# Patient Record
Sex: FEMALE | Race: WHITE | Employment: OTHER | URBAN - METROPOLITAN AREA
[De-identification: names, ages, dates, MRNs, and addresses within clinical notes are randomized per-mention and may not be internally consistent; named-entity substitution may affect disease eponyms.]

---

## 2017-02-14 ENCOUNTER — OFFICE VISIT (OUTPATIENT)
Dept: FAMILY MEDICINE | Facility: CLINIC | Age: 77
End: 2017-02-14
Payer: MEDICARE

## 2017-02-14 VITALS
BODY MASS INDEX: 18.47 KG/M2 | SYSTOLIC BLOOD PRESSURE: 127 MMHG | HEIGHT: 63 IN | DIASTOLIC BLOOD PRESSURE: 79 MMHG | WEIGHT: 104.25 LBS | TEMPERATURE: 97.4 F | OXYGEN SATURATION: 98 % | HEART RATE: 62 BPM | RESPIRATION RATE: 16 BRPM

## 2017-02-14 DIAGNOSIS — H61.23 BILATERAL IMPACTED CERUMEN: ICD-10-CM

## 2017-02-14 DIAGNOSIS — Z12.11 SPECIAL SCREENING FOR MALIGNANT NEOPLASMS, COLON: ICD-10-CM

## 2017-02-14 DIAGNOSIS — L98.9 SKIN LESION: Primary | ICD-10-CM

## 2017-02-14 PROCEDURE — 99214 OFFICE O/P EST MOD 30 MIN: CPT | Performed by: NURSE PRACTITIONER

## 2017-02-14 NOTE — MR AVS SNAPSHOT
After Visit Summary   2/14/2017    Elizabeth Taylor    MRN: [id number]           Patient Information     Date Of Birth          1940        Visit Information        Provider Department      2/14/2017 12:45 PM Felisha Davis NP [facility]        Today's Diagnoses     Skin lesion    -  1    Bilateral impacted cerumen        Special screening for malignant neoplasms, colon           Follow-ups after your visit        Additional Services     DERMATOLOGY REFERRAL       Your provider has referred you to: [facility] [place] [phone]   [email or web address]    Please be aware that coverage of these services is subject to the terms and limitations of your health insurance plan.  Call member services at your health plan with any benefit or coverage questions.      Please bring the following with you to your appointment:    (1) Any X-Rays, CTs or MRIs which have been performed.  Contact the facility where they were done to arrange for  prior to your scheduled appointment.    (2) List of current medications  (3) This referral request   (4) Any documents/labs given to you for this referral            GASTROENTEROLOGY ADULT REF PROCEDURE ONLY       [facility] [phone]                  Who to contact     If you have questions or need follow up information about today's clinic visit or your schedule please contact [facility] directly at [phone].  Normal or non-critical lab and imaging results will be communicated to you by MyChart, letter or phone within 4 business days after the clinic has received the results. If you do not hear from us within 7 days, please contact the clinic through MyChart or phone. If you have a critical or abnormal lab result, we will notify you by phone as soon as possible.  Submit refill requests through [employer] or call your pharmacy and they will forward the refill  "request to us. Please allow 3 business days for your refill to be completed.          Additional Information About Your Visit        MyChart Information     [employer] lets you send messages to your doctor, view your test results, renew your prescriptions, schedule appointments and more. To sign up, go to [email or web address] . Click on \"Log in\" on the left side of the screen, which will take you to the Welcome page. Then click on \"Sign up Now\" on the right side of the page.     You will be asked to enter the access code listed below, as well as some personal information. Please follow the directions to create your username and password.     Your access code is: [email or web address]  Expires: 5/15/2017  1:37 PM     Your access code will  in 90 days. If you need help or a new code, please call your [place] clinic or [phone].        Care EveryWhere ID     This is your Care EveryWhere ID. This could be used by other organizations to access your [place] medical records  [id number]        Your Vitals Were     Pulse Temperature Respirations Height Pulse Oximetry BMI (Body Mass Index)    62 97.4  F (36.3  C) (Tympanic) 16 5' 3.25\" (1.607 m) 98% 18.32 kg/m2       Blood Pressure from Last 3 Encounters:   17 127/79   12 102/62   11 106/68    Weight from Last 3 Encounters:   17 104 lb 4 oz (47.3 kg)   12 116 lb 3.2 oz (52.7 kg)   09 115 lb (52.2 kg)              We Performed the Following     DERMATOLOGY REFERRAL     GASTROENTEROLOGY ADULT REF PROCEDURE ONLY        Primary Care Provider Office Phone # Fax #    Baron Seth [phone]       [address]  [facility] [address]        Thank you!     Thank you for choosing [facility]  for your care. Our goal is always to provide you with excellent care. Hearing back from our patients is one way we can continue to improve our services. Please take a few minutes to complete the " written survey that you may receive in the mail after your visit with us. Thank you!             Your Updated Medication List - Protect others around you: Learn how to safely use, store and throw away your medicines at www.disposemymeds.org.          This list is accurate as of: 2/14/17  1:37 PM.  Always use your most recent med list.                   Brand Name Dispense Instructions for use    TYLENOL EXTRA STRENGTH PO      1 TABLET EVERY 4 HOURS AS NEEDED

## 2017-02-14 NOTE — NURSING NOTE
"Chief Complaint   Patient presents with     Derm Problem     bleeding area below anal area      Derm Problem     lump on groin discovered      Derm Problem     spot on face        Initial /79  Pulse 62  Temp 97.4  F (36.3  C) (Tympanic)  Resp 16  Ht 5' 3.25\" (1.607 m)  Wt 104 lb 4 oz (47.3 kg)  SpO2 98%  BMI 18.32 kg/m2 Estimated body mass index is 18.32 kg/(m^2) as calculated from the following:    Height as of this encounter: 5' 3.25\" (1.607 m).    Weight as of this encounter: 104 lb 4 oz (47.3 kg).  Medication Reconciliation: complete     Vangie Beltre MA      "

## 2017-02-14 NOTE — PROGRESS NOTES
"  SUBJECTIVE:                                                    Elizabeth Taylor is a 76 year old female who presents to clinic today for the following health issues:      Derm Problem      Duration: a few weeks     Description  Location: area below anus, spot on face, and bump on groin.     Also she has had shingles and she has been using a paste. She states she is \"okay\" with the shingles; gradually got better.     She notes that she has bad posture and she has been doing exercises ([facility]).        Spot on left cheek started bleeding recently.  Has been present for several months.  She did notice a couple of spots of blood with wiping after a BM when the bump on her anus was larger.  This occurred after having a larger than normal BM.  She has since noticed that this lump has become smaller in size.  She has never had colon cancer screening.    She thinks her hearing has worsened lately.      Problem list and histories reviewed & adjusted, as indicated.  Additional history: as documented    Problem list, Medication list, Allergies, and Medical/Social/Surgical histories reviewed in EPIC and updated as appropriate.    ROS:  C: NEGATIVE for fever, chills, change in weight  INTEGUMENTARY/SKIN: see HPI  ENT/MOUTH: see HPI  R: NEGATIVE for significant cough or SOB  CV: NEGATIVE for chest pain, palpitations or peripheral edema  GI: NEGATIVE for nausea, abdominal pain, heartburn, or change in bowel habits  MUSCULOSKELETAL: arthritis pain    OBJECTIVE:                                                    /79  Pulse 62  Temp 97.4  F (36.3  C) (Tympanic)  Resp 16  Ht 5' 3.25\" (1.607 m)  Wt 104 lb 4 oz (47.3 kg)  SpO2 98%  BMI 18.32 kg/m2  Body mass index is 18.32 kg/(m^2).  GENERAL: healthy, alert and no distress  HENT: normal cephalic/atraumatic, both ears: occluded with wax, nose and mouth without ulcers or lesions, oropharynx clear and oral mucous membranes moist  NECK: no adenopathy, no " asymmetry, masses, or scars and thyroid normal to palpation  RESP: lungs clear to auscultation - no rales, rhonchi or wheezes  CV: regular rate and rhythm, normal S1 S2, no S3 or S4, no murmur, click or rub, no peripheral edema and peripheral pulses strong  SKIN: irregular erythematous papule on left cheek; 1 cm mobile cystic nodule left inguinal area  MS: significant varicose veins bilaterally  Rectal: small slightly erythematous lesion at 5:00 position, possible resolving hemorrhoid         ASSESSMENT/PLAN:                                                            1. Skin lesion  Will refer to dermatology for facial lesion.    - DERMATOLOGY REFERRAL    2. Bilateral impacted cerumen  Ear wash without instruments done.     3. Special screening for malignant neoplasms, colon  Will refer for a colonoscopy.   - GASTROENTEROLOGY ADULT REF PROCEDURE ONLY    More than 40 minutes spent with patient today, >50% in counseling regarding skin lesion, bowel habits.  Felisha Davis NP  [facility]

## 2017-02-23 ENCOUNTER — TRANSFERRED RECORDS (OUTPATIENT)
Dept: HEALTH INFORMATION MANAGEMENT | Facility: CLINIC | Age: 77
End: 2017-02-23

## 2017-03-03 ENCOUNTER — TELEPHONE (OUTPATIENT)
Dept: FAMILY MEDICINE | Facility: CLINIC | Age: 77
End: 2017-03-03

## 2017-03-03 NOTE — TELEPHONE ENCOUNTER
Left detailed message on VM informing patient she will need to schedule an appointment with Gerda to discuss the problem she describes.  Lucia Jones RN

## 2017-03-03 NOTE — TELEPHONE ENCOUNTER
Reason for call: Symptom   Symptom or request: Patient saw Ekaterina recently but forgot to ask about her nervousness or shaky legs when she is in bed. It wakes her up at night and she can't get back to sleep. Patient notices this more when she is very active during the day.  Patient said she was told by a pharmacist to take Quinine but would like Ekaterina's opinion.      Duration (how long have symptoms been present): a couple of years per patient  Have you been treated for this before? No    Additional comments: I let the patient know since Ekaterina has not seen her for this issue she may need an appointment with her. Patient is aware Ekaterina will be back on Monday and is ok to wait for a call back.    Phone Number Pt can be reached at: Home number on file [phone] (home)  Best Time: Any  Can we leave a detailed message on this number? YES

## 2017-03-16 ENCOUNTER — OFFICE VISIT (OUTPATIENT)
Dept: FAMILY MEDICINE | Facility: CLINIC | Age: 77
End: 2017-03-16
Payer: MEDICARE

## 2017-03-16 VITALS
HEART RATE: 79 BPM | SYSTOLIC BLOOD PRESSURE: 128 MMHG | DIASTOLIC BLOOD PRESSURE: 77 MMHG | OXYGEN SATURATION: 99 % | TEMPERATURE: 98.6 F | RESPIRATION RATE: 18 BRPM

## 2017-03-16 DIAGNOSIS — Z86.39 HISTORY OF GRAVES' DISEASE: ICD-10-CM

## 2017-03-16 DIAGNOSIS — K62.5 RECTAL BLEEDING: ICD-10-CM

## 2017-03-16 DIAGNOSIS — Z12.11 SPECIAL SCREENING FOR MALIGNANT NEOPLASMS, COLON: ICD-10-CM

## 2017-03-16 DIAGNOSIS — G25.81 RESTLESS LEG SYNDROME: Primary | ICD-10-CM

## 2017-03-16 LAB
ERYTHROCYTE [DISTWIDTH] IN BLOOD BY AUTOMATED COUNT: 13.5 % (ref 10–15)
HCT VFR BLD AUTO: 39.5 % (ref 35–47)
HGB BLD-MCNC: 12.5 G/DL (ref 11.7–15.7)
MCH RBC QN AUTO: 31.1 PG (ref 26.5–33)
MCHC RBC AUTO-ENTMCNC: 31.6 G/DL (ref 31.5–36.5)
MCV RBC AUTO: 98 FL (ref 78–100)
PLATELET # BLD AUTO: 187 10E9/L (ref 150–450)
RBC # BLD AUTO: 4.02 10E12/L (ref 3.8–5.2)
WBC # BLD AUTO: 5.1 10E9/L (ref 4–11)

## 2017-03-16 PROCEDURE — 84443 ASSAY THYROID STIM HORMONE: CPT | Performed by: NURSE PRACTITIONER

## 2017-03-16 PROCEDURE — 99214 OFFICE O/P EST MOD 30 MIN: CPT | Performed by: NURSE PRACTITIONER

## 2017-03-16 PROCEDURE — 85027 COMPLETE CBC AUTOMATED: CPT | Performed by: NURSE PRACTITIONER

## 2017-03-16 PROCEDURE — 36415 COLL VENOUS BLD VENIPUNCTURE: CPT | Performed by: NURSE PRACTITIONER

## 2017-03-16 RX ORDER — PRAMIPEXOLE DIHYDROCHLORIDE 0.12 MG/1
TABLET ORAL
Qty: 60 TABLET | Refills: 5 | Status: ON HOLD | OUTPATIENT
Start: 2017-03-16 | End: 2018-04-04

## 2017-03-16 NOTE — PATIENT INSTRUCTIONS
Start Mirapex - take one tablet 2-3 hours before bed.  You may increase it to 2 tablets after a week if needed.

## 2017-03-16 NOTE — NURSING NOTE
"Chief Complaint   Patient presents with     Musculoskeletal Problem     restless legs: discuss options      Spasms     Orders     colonoscopy      Derm Problem     skin cancer on cheek, Appoitment scheduled for may. She has been using cream for her Rosacea on her right cheek.        Initial /77  Pulse 79  Temp 98.6  F (37  C) (Oral)  Resp 18  SpO2 99% Estimated body mass index is 18.32 kg/(m^2) as calculated from the following:    Height as of 2/14/17: 5' 3.25\" (1.607 m).    Weight as of 2/14/17: 104 lb 4 oz (47.3 kg).  Medication Reconciliation: complete     Vangie Beltre MA      "

## 2017-03-16 NOTE — PROGRESS NOTES
"  SUBJECTIVE:                                                    Elizabeth Taylor is a 76 year old female who presents to clinic today for the following health issues:      Skin cancer on cheek  Appointment Scheduled for May.    Rosacea on right cheek   Cream helped     Back Spasm  March 5th,   Pt got an over whelming back spasms, lower back, especially right side and right thigh.   She had jury duty that day and she had a lot of pain, she made it to jury duty.   Currently her right thigh is still painful     URI  Saturday March 11 th cold started   Nasal congestion, Sinus pressure/pain on top of head with body aches with addition to the back spasms, \"very painful\".  She slept and took a hot bath. Hot bath gave pt relief.   She is currently feeling better. She took Mucus Relief yesterday that has helped.     Restless leg    Duration: ongoing. Pt went to [facility] and picked up Restless legs OTC medication in the past.   Discontinued medication that was helpful in the past.    Acetaminophen helps. She has tried cream that helps that she massages onto her legs.     She would like to discuss options for treatment.      She will have \"twitching\" in her legs, makes her feel restless and a need to move.      She was in last month for some bleeding with bowel movements (just drops).  A referral for a colonoscopy was done, but they were unable to schedule the appointment.         Problem list and histories reviewed & adjusted, as indicated.  Additional history: as documented    Patient Active Problem List   Diagnosis     CARDIOVASCULAR SCREENING; LDL GOAL LESS THAN 160     Dry eye syndrome     Past Surgical History   Procedure Laterality Date     Surgical history of -        endometriosis       Social History   Substance Use Topics     Smoking status: Never Smoker     Smokeless tobacco: Not on file     Alcohol use No     Family History   Problem Relation Age of Onset     CANCER Sister      CANCER Maternal Grandmother  "     lymphoma     HEART DISEASE Father      MI     Uterine Cancer Niece            Reviewed and updated as needed this visit by clinical staff       Reviewed and updated as needed this visit by Provider         ROS:  C: NEGATIVE for fever, chills, change in weight  E/M: NEGATIVE for ear, mouth and throat problems  R: NEGATIVE for significant cough or SOB  CV: NEGATIVE for chest pain, palpitations or peripheral edema  GI: occasional constipation  HEME/ALLERGY/IMMUNE: see HPI  PSYCHIATRIC: NEGATIVE for changes in mood or affect    OBJECTIVE:                                                    /77  Pulse 79  Temp 98.6  F (37  C) (Oral)  Resp 18  SpO2 99%  There is no height or weight on file to calculate BMI.  GENERAL: healthy, alert and no distress  RESP: lungs clear to auscultation - no rales, rhonchi or wheezes  CV: regular rate and rhythm, normal S1 S2, no S3 or S4, no murmur, click or rub, no peripheral edema and peripheral pulses strong  MS: no gross musculoskeletal defects noted, no edema  PSYCH: mentation appears normal, affect normal/bright         ASSESSMENT/PLAN:                                                            1. Restless leg syndrome  Discussed the use and indication of this medication as well as potential side effects.   - pramipexole (MIRAPEX) 0.125 MG tablet; Take one tablet 2-3 hours before bedtime, may increase to 2 tablets after one week if needed  Dispense: 60 tablet; Refill: 5  - CBC with platelets    2. Rectal bleeding    - COLORECTAL SURGERY REFERRAL    3. History of Graves' disease    - TSH with free T4 reflex    4. Special screening for malignant neoplasms, colon    - COLORECTAL SURGERY REFERRAL        Felisha Davis NP  [facility]

## 2017-03-16 NOTE — MR AVS SNAPSHOT
After Visit Summary   3/16/2017    Elizabeth Taylor    MRN: [id number]           Patient Information     Date Of Birth          1940        Visit Information        Provider Department      3/16/2017 1:45 PM Felisha Davis NP [facility]        Today's Diagnoses     Restless leg syndrome    -  1    Rectal bleeding        History of Graves' disease        Special screening for malignant neoplasms, colon          Care Instructions    Start Mirapex - take one tablet 2-3 hours before bed.  You may increase it to 2 tablets after a week if needed.         Follow-ups after your visit        Additional Services     COLORECTAL SURGERY REFERRAL       Your provider has referred you to: [facility]: Colon and Rectal Surgery Clinic [facility] [phone]   [email or web address]    Referral Reason(s): Colonoscopy, rectal bleeding, rectal lesion  Special Concerns: None  This referral is: Elective (week +)  It is OK to leave a message on patient's voicemail.    Please be aware that coverage of these services is subject to the terms and limitations of your health insurance plan.  Call member services at your health plan with any benefit or coverage questions.      Please bring the following with you to your appointment:    (1) Any X-Rays, CTs or MRIs which have been performed.  Contact the facility where they were done to arrange for  prior to your scheduled appointment.    (2) List of current medications  (3) This referral request   (4) Any documents/labs given to you for this referral                  Who to contact     If you have questions or need follow up information about today's clinic visit or your schedule please contact [facility] directly at [phone].  Normal or non-critical lab and imaging results will be communicated to you by MyChart, letter or phone within 4 business days after the clinic has  "received the results. If you do not hear from us within 7 days, please contact the clinic through [employer] or phone. If you have a critical or abnormal lab result, we will notify you by phone as soon as possible.  Submit refill requests through [employer] or call your pharmacy and they will forward the refill request to us. Please allow 3 business days for your refill to be completed.          Additional Information About Your Visit        [employer] Information     [employer] lets you send messages to your doctor, view your test results, renew your prescriptions, schedule appointments and more. To sign up, go to [email or web address] . Click on \"Log in\" on the left side of the screen, which will take you to the Welcome page. Then click on \"Sign up Now\" on the right side of the page.     You will be asked to enter the access code listed below, as well as some personal information. Please follow the directions to create your username and password.     Your access code is: [email or web address]  Expires: 5/15/2017  2:37 PM     Your access code will  in 90 days. If you need help or a new code, please call your [facility] clinic or [phone].        Care EveryWhere ID     This is your Care EveryWhere ID. This could be used by other organizations to access your [facility] medical records  [id number]         Blood Pressure from Last 3 Encounters:   17 127/79   12 102/62   11 106/68    Weight from Last 3 Encounters:   17 104 lb 4 oz (47.3 kg)   12 116 lb 3.2 oz (52.7 kg)   09 115 lb (52.2 kg)              We Performed the Following     CBC with platelets     COLORECTAL SURGERY REFERRAL     TSH with free T4 reflex          Today's Medication Changes          These changes are accurate as of: 3/16/17  2:29 PM.  If you have any questions, ask your nurse or doctor.               Start taking these medicines.        Dose/Directions    pramipexole 0.125 MG tablet   Commonly known as:  MIRAPEX   Used " for:  Restless leg syndrome        Take one tablet 2-3 hours before bedtime, may increase to 2 tablets after one week if needed   Quantity:  60 tablet   Refills:  5            Where to get your medicines      These medications were sent to [facility] - [place], [place] - [address]  [address], [place] [address]     Phone:  [phone]     pramipexole 0.125 MG tablet                Primary Care Provider Office Phone # Fax #    Baron Seth [phone]       [facility] [address]  [facility] [address]        Thank you!     Thank you for choosing [facility]  for your care. Our goal is always to provide you with excellent care. Hearing back from our patients is one way we can continue to improve our services. Please take a few minutes to complete the written survey that you may receive in the mail after your visit with us. Thank you!             Your Updated Medication List - Protect others around you: Learn how to safely use, store and throw away your medicines at www.disposemymeds.org.          This list is accurate as of: 3/16/17  2:29 PM.  Always use your most recent med list.                   Brand Name Dispense Instructions for use    pramipexole 0.125 MG tablet    MIRAPEX    60 tablet    Take one tablet 2-3 hours before bedtime, may increase to 2 tablets after one week if needed       TYLENOL EXTRA STRENGTH PO      1 TABLET EVERY 4 HOURS AS NEEDED

## 2017-03-16 NOTE — LETTER
[facility]   [address], [place]  [address]  [phone]      March 22, 2017      Elizabeth Taylor  [address] [place] [address]    [place] [address]              Dear Ms. Taylor,    Thyroid and hemoglobin levels are normal.    Results for orders placed or performed in visit on 03/16/17   CBC with platelets   Result Value Ref Range    WBC 5.1 4.0 - 11.0 10e9/L    RBC Count 4.02 3.8 - 5.2 10e12/L    Hemoglobin 12.5 11.7 - 15.7 g/dL    Hematocrit 39.5 35.0 - 47.0 %    MCV 98 78 - 100 fl    MCH 31.1 26.5 - 33.0 pg    MCHC 31.6 31.5 - 36.5 g/dL    RDW 13.5 10.0 - 15.0 %    Platelet Count 187 150 - 450 10e9/L   TSH with free T4 reflex   Result Value Ref Range    TSH 2.01 0.40 - 4.00 mU/L           Sincerely,    Felisha Davis, GIBRAN/nr

## 2017-03-17 LAB — TSH SERPL DL<=0.005 MIU/L-ACNC: 2.01 MU/L (ref 0.4–4)

## 2017-03-22 ENCOUNTER — TRANSFERRED RECORDS (OUTPATIENT)
Dept: HEALTH INFORMATION MANAGEMENT | Facility: CLINIC | Age: 77
End: 2017-03-22

## 2017-04-07 ENCOUNTER — TELEPHONE (OUTPATIENT)
Dept: GASTROENTEROLOGY | Facility: CLINIC | Age: 77
End: 2017-04-07

## 2017-04-07 DIAGNOSIS — Z12.11 ENCOUNTER FOR SCREENING COLONOSCOPY: Primary | ICD-10-CM

## 2017-04-07 NOTE — TELEPHONE ENCOUNTER
Patient scheduled for Colonoscopy    Indication for procedure.   Rectal bleeding       Special screening for malignant neoplasms, colon        Referring Provider. Felisha Davis NP    ? Not Needed    Arrival time verified? Yes, 1100    Facility location verified? Yes, [facility]    Instructions given regarding prep and procedure    Prep Type Golytely    Are you taking any anticoagulants or blood thinners? No    Instructions given? N/A    Electronic implanted devices? No    Pre procedure teaching completed? Yes    Transportation from procedure? Friend, Friend will stay with patient after procedure    H&P / Pre op physical completed? N/A

## 2017-04-13 ENCOUNTER — SURGERY (OUTPATIENT)
Age: 77
End: 2017-04-13

## 2017-04-13 ENCOUNTER — HOSPITAL ENCOUNTER (OUTPATIENT)
Facility: CLINIC | Age: 77
Discharge: HOME | End: 2017-04-13
Attending: INTERNAL MEDICINE | Admitting: INTERNAL MEDICINE
Payer: MEDICARE

## 2017-04-13 VITALS
SYSTOLIC BLOOD PRESSURE: 112 MMHG | HEIGHT: 64 IN | DIASTOLIC BLOOD PRESSURE: 70 MMHG | OXYGEN SATURATION: 100 % | RESPIRATION RATE: 18 BRPM

## 2017-04-13 LAB — COLONOSCOPY: NORMAL

## 2017-04-13 PROCEDURE — 25000132 ZZH RX MED GY IP 250 OP 250 PS 637: Mod: GY | Performed by: INTERNAL MEDICINE

## 2017-04-13 PROCEDURE — 99153 MOD SED SAME PHYS/QHP EA: CPT | Performed by: INTERNAL MEDICINE

## 2017-04-13 PROCEDURE — 25000125 ZZHC RX 250: Performed by: INTERNAL MEDICINE

## 2017-04-13 PROCEDURE — 45378 DIAGNOSTIC COLONOSCOPY: CPT | Performed by: INTERNAL MEDICINE

## 2017-04-13 PROCEDURE — 25000128 H RX IP 250 OP 636: Performed by: INTERNAL MEDICINE

## 2017-04-13 PROCEDURE — G0500 MOD SEDAT ENDO SERVICE >5YRS: HCPCS | Performed by: INTERNAL MEDICINE

## 2017-04-13 RX ORDER — SIMETHICONE
LIQUID (ML) MISCELLANEOUS PRN
Status: DISCONTINUED | OUTPATIENT
Start: 2017-04-13 | End: 2017-04-13 | Stop reason: HOSPADM

## 2017-04-13 RX ORDER — FENTANYL CITRATE 50 UG/ML
INJECTION, SOLUTION INTRAMUSCULAR; INTRAVENOUS PRN
Status: DISCONTINUED | OUTPATIENT
Start: 2017-04-13 | End: 2017-04-13 | Stop reason: HOSPADM

## 2017-04-13 RX ADMIN — Medication 2 ML: at 11:52

## 2017-04-13 RX ADMIN — FENTANYL CITRATE 50 MCG: 50 INJECTION, SOLUTION INTRAMUSCULAR; INTRAVENOUS at 12:09

## 2017-04-13 RX ADMIN — MIDAZOLAM HYDROCHLORIDE 1 MG: 1 INJECTION, SOLUTION INTRAMUSCULAR; INTRAVENOUS at 12:13

## 2017-04-13 RX ADMIN — MIDAZOLAM HYDROCHLORIDE 1 MG: 1 INJECTION, SOLUTION INTRAMUSCULAR; INTRAVENOUS at 12:09

## 2017-04-13 RX ADMIN — FENTANYL CITRATE 50 MCG: 50 INJECTION, SOLUTION INTRAMUSCULAR; INTRAVENOUS at 12:13

## 2017-04-13 NOTE — IP AVS SNAPSHOT
"                  MRN:[id number]                      After Visit Summary   4/13/2017    Elizabeth Taylor    MRN: [id number]           Thank you!     Thank you for choosing [facility] for your care. Our goal is always to provide you with excellent care. Hearing back from our patients is one way we can continue to improve our services. Please take a few minutes to complete the written survey that you may receive in the mail after you visit with us. Thank you!        Patient Information     Date Of Birth          1940        About your hospital stay     You were admitted on:  April 13, 2017 You last received care in the:  [facility], Endoscopy    You were discharged on:  April 13, 2017       Who to Call     For medical emergencies, please call 911.  For non-urgent questions about your medical care, please call your primary care provider or clinic, [phone]  For questions related to your surgery, please call your surgery clinic        Attending Provider     Provider Specialty    Juan Dos Santos MD Gastroenterology       Primary Care Provider Office Phone # Fax #    Baron Dread Seth [phone]       [address] [address]        Pending Results     No orders found from 4/11/2017 to 4/14/2017.            Admission Information     Date & Time Provider Department Dept. Phone    4/13/2017 Juan Dos Santos MD [facility], [facility], Endoscopy [phone]      Your Vitals Were     Blood Pressure Respirations Height Pulse Oximetry          104/68 14 1.626 m (5' 4\") 99%        MyChart Information     [employer] lets you send messages to your doctor, view your test results, renew your prescriptions, schedule appointments and more. To sign up, go to [email or web address] . Click on \"Log in\" on the left side of the screen, which will take you to the Welcome page. Then click on \"Sign up Now\" on the right side of the page.     You will be asked to enter the " access code listed below, as well as some personal information. Please follow the directions to create your username and password.     Your access code is: [email or web address]  Expires: 5/15/2017  2:37 PM     Your access code will  in 90 days. If you need help or a new code, please call your [facility] clinic or [phone].        Care EveryWhere ID     This is your Care EveryWhere ID. This could be used by other organizations to access your [facility] medical records  [id number]           Review of your medicines      UNREVIEWED medicines. Ask your doctor about these medicines        Dose / Directions    pramipexole 0.125 MG tablet   Commonly known as:  MIRAPEX   Used for:  Restless leg syndrome        Take one tablet 2-3 hours before bedtime, may increase to 2 tablets after one week if needed   Quantity:  60 tablet   Refills:  5       TYLENOL EXTRA STRENGTH PO        1 TABLET EVERY 4 HOURS AS NEEDED   Refills:  0                Protect others around you: Learn how to safely use, store and throw away your medicines at www.disposemymeds.org.             Medication List: This is a list of all your medications and when to take them. Check marks below indicate your daily home schedule. Keep this list as a reference.      Medications           Morning Afternoon Evening Bedtime As Needed    pramipexole 0.125 MG tablet   Commonly known as:  MIRAPEX   Take one tablet 2-3 hours before bedtime, may increase to 2 tablets after one week if needed                                TYLENOL EXTRA STRENGTH PO   1 TABLET EVERY 4 HOURS AS NEEDED                                          More Information        Diverticulosis    Diverticulosis means that small pouches have formed in the wall of your large intestine (colon). Most often, this problem causes no symptoms and is common as people age. But the pouches in the colon are at risk of becoming infected. When this happens, the condition is called diverticulitis. Although most people  with diverticulosis never develop diverticulitis, it is still not uncommon. Rectal bleeding can also occur and in less common situations, a type of colon inflammation called colitis.  While most people do not have symptoms, some people with diverticulosis may have:    Abdominal cramps and pain    Bloating    Constipation    Change in bowel habits  Causes  The exact cause of diverticulosis (and diverticulitis) has not been proved, but a few things are associated with the condition:    Low-fiber diet    Constipation    Lack of exercise  Your healthcare provider will talk with you about how to manage your condition. Diet changes may be all that are needed to help control diverticulosis and prevent progression to diverticulitis. If you develop diverticulitis, you will likely need other treatments.  Home care  You may be told to take fiber supplements daily. Fiber adds bulk to the stool so that it passes through the colon more easily. Stool softeners may be recommended. You may also be given medications for pain relief. Be sure to take all medications as directed.  In the past, people were told to avoid corn, nuts, and seeds. This is no longer necessary.  Follow these guidelines when caring for yourself at home:    Eat unprocessed foods that are high in fiber. Whole grains, fruits, and vegetables are good choices.    Drink 6 to 8 glasses of water every day unless your healthcare provider has you limit how much fluid you should have.    Watch for changes in your bowel movements. Tell your provider if you notice any changes.    Begin an exercise program. Ask your provider how to get started. Generally, walking is the best.    Get plenty of rest and sleep.  Follow-up care  Follow up with your healthcare provider, or as advised. Regular visits may be needed to check on your health. Sometimes special procedures such as colonoscopy, are needed after an episode of diverticulitis or blooding. Be sure to keep all your  appointments.  If a stool sample was taken, or cultures were done, you should be told if they are positive, or if your treatment needs to be changed. You can call as directed for the results.  If X-rays were done, a radiologist will look at them. You will be told if there is a change in your treatment.  If antibiotics were prescribed, be sure to finish them all.  When to seek medical advice  Call your healthcare provider right away if any of these occur:    Fever of 100.4 F (38 C) or higher, or as directed by your healthcare provider    Severe cramps in the lower left side of the abdomen or pain that is getting worse    Tenderness in the lower left side of the abdomen or worsening pain throughout the abdomen    Diarrhea or constipation that doesn't get better within 24 hours    Nausea and vomiting    Bleeding from the rectum  Call 911  Call emergency services if any of the following occur:    Trouble breathing    Confusion    Very drowsy or trouble awakening    Fainting or loss of consciousness    Rapid heart rate    Chest pain    6605-3815 [employer]. [address] [place] [address], [place] [address]. All rights reserved. This information is not intended as a substitute for professional medical care. Always follow your healthcare professional's instructions.

## 2017-04-13 NOTE — IP AVS SNAPSHOT
[facility], [place], Endoscopy    [address] [address]    Phone:  [phone]                                       After Visit Summary   4/13/2017    Elizabeth Taylor    MRN: [id number]           After Visit Summary Signature Page     I have received my discharge instructions, and my questions have been answered. I have discussed any challenges I see with this plan with the nurse or doctor.    ..........................................................................................................................................  Patient/Patient Representative Signature      ..........................................................................................................................................  Patient Representative Print Name and Relationship to Patient    ..................................................               ................................................  Date                                            Time    ..........................................................................................................................................  Reviewed by Signature/Title    ...................................................              ..............................................  Date                                                            Time

## 2017-04-13 NOTE — OR NURSING
Colonoscopy under conscious sedation wearing 2lpm 02 via NC.  Pt on left side for exam, tolerated procedure.  Pt having frequent PAC's, MD aware.  Post exam, pt denies SOB/CP/abd pain.

## 2017-04-13 NOTE — OR NURSING
Dr. Pineda spoke with pt regarding procedure findings and follow up. All questions answered. Pt stable upon d/c with friend at side. Pt denied pain, lightheadedness, and dizziness.

## 2017-12-06 ENCOUNTER — OFFICE VISIT (OUTPATIENT)
Dept: FAMILY MEDICINE | Facility: CLINIC | Age: 77
End: 2017-12-06
Payer: MEDICARE

## 2017-12-06 VITALS
BODY MASS INDEX: 18.43 KG/M2 | DIASTOLIC BLOOD PRESSURE: 77 MMHG | TEMPERATURE: 98 F | RESPIRATION RATE: 18 BRPM | SYSTOLIC BLOOD PRESSURE: 124 MMHG | HEART RATE: 73 BPM | WEIGHT: 107.38 LBS | OXYGEN SATURATION: 97 %

## 2017-12-06 DIAGNOSIS — Z23 NEED FOR VACCINATION: ICD-10-CM

## 2017-12-06 DIAGNOSIS — K64.4 EXTERNAL HEMORRHOIDS: Primary | ICD-10-CM

## 2017-12-06 DIAGNOSIS — R42 DIZZINESS: ICD-10-CM

## 2017-12-06 PROCEDURE — 92551 PURE TONE HEARING TEST AIR: CPT | Performed by: NURSE PRACTITIONER

## 2017-12-06 PROCEDURE — 99214 OFFICE O/P EST MOD 30 MIN: CPT | Performed by: NURSE PRACTITIONER

## 2017-12-06 NOTE — NURSING NOTE
HEARING FREQUENCY:   Right Ear:     500 Hz: 40 db HL   1000 Hz: 40 db HL   2000 Hz: 40 db HL   4000 Hz: not heard   Left Ear:     500 Hz: 40 db HL   1000 Hz: 40 db HL   2000 Hz: not heard    4000 Hz: not heard       Vangie Beltre MA

## 2017-12-06 NOTE — MR AVS SNAPSHOT
After Visit Summary   12/6/2017    Elizabeth Taylor    MRN: [id number]           Patient Information     Date Of Birth          1940        Visit Information        Provider Department      12/6/2017 10:30 AM Felisha Davis NP [facility]        Today's Diagnoses     External hemorrhoids    -  1    Asymmetrical hearing loss, right        Dizziness        Need for vaccination           Follow-ups after your visit        Additional Services     COLORECTAL SURGERY REFERRAL       Your provider has referred you to: [facility]: Colon and Rectal Surgery [facility] [phone]   [email or web address]    Referral Reason(s): Hemorrhoids  Special Concerns: None  This referral is: Elective (week +)  It is OK to leave a message on patient's voicemail.    Please be aware that coverage of these services is subject to the terms and limitations of your health insurance plan.  Call member services at your health plan with any benefit or coverage questions.      Please bring the following with you to your appointment:    (1) Any X-Rays, CTs or MRIs which have been performed.  Contact the facility where they were done to arrange for  prior to your scheduled appointment.    (2) List of current medications  (3) This referral request   (4) Any documents/labs given to you for this referral                  Future tests that were ordered for you today     Open Future Orders        Priority Expected Expires Ordered    TDAP VACCINE (ADACEL) Routine  12/6/2018 12/6/2017    PNEUMOCOCCAL CONJ VACCINE 13 VALENT IM Routine  12/6/2018 12/6/2017            Who to contact     If you have questions or need follow up information about today's clinic visit or your schedule please contact [facility] directly at [phone].  Normal or non-critical lab and imaging results will be communicated to you by MyChart, letter or phone  "within 4 business days after the clinic has received the results. If you do not hear from us within 7 days, please contact the clinic through [employer] or phone. If you have a critical or abnormal lab result, we will notify you by phone as soon as possible.  Submit refill requests through [employer] or call your pharmacy and they will forward the refill request to us. Please allow 3 business days for your refill to be completed.          Additional Information About Your Visit        [employer] Information     [employer] lets you send messages to your doctor, view your test results, renew your prescriptions, schedule appointments and more. To sign up, go to [email or web address] . Click on \"Log in\" on the left side of the screen, which will take you to the Welcome page. Then click on \"Sign up Now\" on the right side of the page.     You will be asked to enter the access code listed below, as well as some personal information. Please follow the directions to create your username and password.     Your access code is: [email or web address]  Expires: 3/6/2018 12:13 PM     Your access code will  in 90 days. If you need help or a new code, please call your [facility] clinic or [phone].        Care EveryWhere ID     This is your Care EveryWhere ID. This could be used by other organizations to access your [facility] medical records  [id number]        Your Vitals Were     Pulse Temperature Respirations Pulse Oximetry BMI (Body Mass Index)       73 98  F (36.7  C) (Oral) 18 97% 18.43 kg/m2        Blood Pressure from Last 3 Encounters:   17 124/77   17 112/70   17 128/77    Weight from Last 3 Encounters:   17 107 lb 6 oz (48.7 kg)   17 104 lb 4 oz (47.3 kg)   12 116 lb 3.2 oz (52.7 kg)              We Performed the Following     COLORECTAL SURGERY REFERRAL     SCREENING TEST, PURE TONE, AIR ONLY          Today's Medication Changes          These changes are accurate as of: 17 12:13 PM.  If you " have any questions, ask your nurse or doctor.               Start taking these medicines.        Dose/Directions    hydrocortisone 2.5 % cream   Commonly known as:  ANUSOL-HC   Used for:  External hemorrhoids        Place rectally 2 times daily   Quantity:  30 g   Refills:  1            Where to get your medicines      These medications were sent to [facility] - [place], [place] - [address]  [address], [place] [address]     Phone:  [phone]     hydrocortisone 2.5 % cream                Primary Care Provider Office Phone # Fax #    Baron Seth [phone]       [address]  [facility] [address]        Equal Access to Services     IONA DALEY : Josué Grubbs, waaxda sonu, qaybta kaalmada eliseo, cyndy catalan. So [facility] [phone].    ATENCIÓN: Si habla español, tiene a ghosh disposición servicios gratuitos de asistencia lingüística. Llame al [phone].    We comply with applicable federal civil rights laws and [place] laws. We do not discriminate on the basis of race, color, national origin, age, disability, sex, sexual orientation, or gender identity.            Thank you!     Thank you for choosing [facility]  for your care. Our goal is always to provide you with excellent care. Hearing back from our patients is one way we can continue to improve our services. Please take a few minutes to complete the written survey that you may receive in the mail after your visit with us. Thank you!             Your Updated Medication List - Protect others around you: Learn how to safely use, store and throw away your medicines at www.disposemymeds.org.          This list is accurate as of: 12/6/17 12:13 PM.  Always use your most recent med list.                   Brand Name Dispense Instructions for use Diagnosis    hydrocortisone 2.5 % cream    ANUSOL-HC    30 g    Place rectally 2 times daily     External hemorrhoids       pramipexole 0.125 MG tablet    MIRAPEX    60 tablet    Take one tablet 2-3 hours before bedtime, may increase to 2 tablets after one week if needed    Restless leg syndrome       * TYLENOL EXTRA STRENGTH PO      1 TABLET EVERY 4 HOURS AS NEEDED        * TYLENOL PO      Take 650 mg by mouth every 8 hours as needed for mild pain or fever        * Notice:  This list has 2 medication(s) that are the same as other medications prescribed for you. Read the directions carefully, and ask your doctor or other care provider to review them with you.

## 2017-12-06 NOTE — PROGRESS NOTES
"  SUBJECTIVE:   Elizabeth Taylor is a 77 year old female who presents to clinic today for the following health issues:      Derm Problem   Follow up rosacea on face  Was referred to dermatology and they took biopsy  Rosacea cleared up in 3-4 months   Continuing to take cream for right cheek     Hemorrhoids       Duration: ongoing. Colonoscopy 4/17    Description:   Pain: yes, really tender when she touches it, small amount of blood with pain   Itching: YES- intermittent, \"hard to keep clean\"    Accompanying signs and symptoms:   Blood in stool: not in the stool    Changes in stool pattern: sometimes when pt has a BM she will have irritation and she will have blood on toilet paper    Therapies tried and outcome: Witch hazel and cotton balls,she has tried using OTC ointment, Vitamin E ointment in a jar       She has occasional problems with her \"equilibrium\".    She will have a slight feeling of the room moving, may be worse in the morning and if she moves her head a certain way.  She denies any headache, weakness, speech difficulty.  Possible decreased hearing right ear.             Problem list and histories reviewed & adjusted, as indicated.  Additional history: as documented    Patient Active Problem List   Diagnosis     CARDIOVASCULAR SCREENING; LDL GOAL LESS THAN 160     Dry eye syndrome     Past Surgical History:   Procedure Laterality Date     COLONOSCOPY N/A 4/13/2017    Procedure: COLONOSCOPY;  Surgeon: Juan Dos Santos MD;  Location:  GI     SURGICAL HISTORY OF -       endometriosis       Social History   Substance Use Topics     Smoking status: Never Smoker     Smokeless tobacco: Not on file     Alcohol use No     Family History   Problem Relation Age of Onset     CANCER Sister      CANCER Maternal Grandmother      lymphoma     HEART DISEASE Father      MI     Uterine Cancer Niece              Reviewed and updated as needed this visit by clinical staffAllergies  Meds       Reviewed and " updated as needed this visit by Provider         ROS:  C: NEGATIVE for fever, chills, change in weight  INTEGUMENTARY/SKIN: see HPI  ENT/MOUTH: see HPI  R: NEGATIVE for significant cough or SOB  CV: NEGATIVE for chest pain, palpitations or peripheral edema  GI: NEGATIVE for nausea, abdominal pain, heartburn, or change in bowel habits; see HPI  NEURO: see HPI  ENDOCRINE: NEGATIVE for temperature intolerance, skin/hair changes    OBJECTIVE:     /77  Pulse 73  Temp 98  F (36.7  C) (Oral)  Resp 18  Wt 107 lb 6 oz (48.7 kg)  SpO2 97%  BMI 18.43 kg/m2  Body mass index is 18.43 kg/(m^2).  GENERAL: healthy, alert and no distress  HENT: ear canals and TM's normal, nose and mouth without ulcers or lesions  NECK: no adenopathy, no asymmetry, masses, or scars and thyroid normal to palpation  RESP: lungs clear to auscultation - no rales, rhonchi or wheezes  CV: regular rate and rhythm, normal S1 S2, no S3 or S4, no murmur, click or rub, no peripheral edema and peripheral pulses strong  ABDOMEN: soft, nontender, no hepatosplenomegaly, no masses and bowel sounds normal  RECTAL (female): external hemorrhoid noted at the 6:00 position   NEURO: Normal strength and tone, mentation intact and speech normal        ASSESSMENT/PLAN:             1. External hemorrhoids  Try Anusol cream, if no improvement, see colorectal surgeon.   - COLORECTAL SURGERY REFERRAL  - hydrocortisone (ANUSOL-HC) 2.5 % cream; Place rectally 2 times daily  Dispense: 30 g; Refill: 1    2. Asymmetrical hearing loss, right  No significant difference on hearing test.  She does have some hearing loss, discussed referral to an audiologist, she prefers to wait at this time.    - SCREENING TEST, PURE TONE, AIR ONLY    3. Dizziness  Will refer to balance PT, she prefers to wait until after the first of the year and will call for a referral at that time.            Felisha Davis NP  [facility]

## 2018-01-02 NOTE — TELEPHONE ENCOUNTER
APPT INFO    Date /Time: 1/12/18   Reason for Appt: Hemorrhoids   Ref Provider/Clinic: [facility]   Are there internal records? Yes/No?  IF YES, list clinic names: Yes  See Above   Are there outside records? Yes/No? No   Patient Contact (Y/N) & Call Details: No referred   Action: Reviewed records; Records are in EPIC     OUTSIDE RECORDS CHECKLIST     CLINIC NAME COMMENTS REC (x) IMG (x)

## 2018-01-11 ENCOUNTER — OFFICE VISIT (OUTPATIENT)
Dept: FAMILY MEDICINE | Facility: CLINIC | Age: 78
End: 2018-01-11
Payer: MEDICARE

## 2018-01-11 ENCOUNTER — TELEPHONE (OUTPATIENT)
Dept: SURGERY | Facility: CLINIC | Age: 78
End: 2018-01-11

## 2018-01-11 VITALS
TEMPERATURE: 97.5 F | WEIGHT: 109.4 LBS | RESPIRATION RATE: 14 BRPM | SYSTOLIC BLOOD PRESSURE: 109 MMHG | HEART RATE: 71 BPM | DIASTOLIC BLOOD PRESSURE: 75 MMHG | OXYGEN SATURATION: 99 % | BODY MASS INDEX: 18.78 KG/M2

## 2018-01-11 DIAGNOSIS — R59.1 LYMPHADENOPATHY: Primary | ICD-10-CM

## 2018-01-11 DIAGNOSIS — I83.92 VARICOSE VEINS OF LEFT LOWER EXTREMITY: ICD-10-CM

## 2018-01-11 PROCEDURE — 99214 OFFICE O/P EST MOD 30 MIN: CPT | Performed by: NURSE PRACTITIONER

## 2018-01-11 NOTE — PROGRESS NOTES
SUBJECTIVE:   Elizabeth Taylor is a 77 year old female who presents to clinic today for the following health issues:      Pt in for cysts in the groin area. Notice since last visit. Its on the left side. 10 days another cysts appear and was painful and then shrink and is hard. First cysts is hard as of now.     About 12 days ago, noticed an exacerbation of a lump in her groin - more and larger, painful.  Improved now.  No fever, weight loss, rashes, abdominal pain, vaginal discharge.     Varicose veins, can have swelling in her legs, wears compression stockings.  History of cat scratch disease - treated about 15 years ago.        Problem list and histories reviewed & adjusted, as indicated.  Additional history: as documented        Reviewed and updated as needed this visit by clinical staff  Tobacco  Allergies  Med Hx  Surg Hx  Fam Hx  Soc Hx      Reviewed and updated as needed this visit by Provider         ROS:  Constitutional, HEENT, cardiovascular, pulmonary, gi and gu systems are negative, except as otherwise noted.      OBJECTIVE:   /75 (BP Location: Left arm, Patient Position: Sitting, Cuff Size: Adult Small)  Pulse 71  Temp 97.5  F (36.4  C) (Oral)  Resp 14  Wt 109 lb 6.4 oz (49.6 kg)  SpO2 99%  BMI 18.78 kg/m2  Body mass index is 18.78 kg/(m^2).  GENERAL: healthy, alert and no distress  RESP: lungs clear to auscultation - no rales, rhonchi or wheezes  CV: regular rate and rhythm, normal S1 S2, no S3 or S4, no murmur, click or rub, no peripheral edema and peripheral pulses strong  ABDOMEN: soft, nontender, no hepatosplenomegaly, no masses and bowel sounds normal  MS: varicose veins left LE  SKIN: no suspicious lesions or rashes  LYMPH: shotty inguinal nodes bilaterally; largest left inguinal node is 1 cm        ASSESSMENT/PLAN:             1. Lymphadenopathy  Resolved.  Discussed normal exam, physiology of lymphatic system.  She will observe and if symptoms recur, she will follow up.      2. Varicose veins of left lower extremity  Continue to wear compression stockings.            Felisha Davis NP  [facility]

## 2018-01-11 NOTE — NURSING NOTE
"Chief Complaint   Patient presents with     Derm Problem     cysts in froin area       Initial /75 (BP Location: Left arm, Patient Position: Sitting, Cuff Size: Adult Small)  Pulse 71  Temp 97.5  F (36.4  C) (Oral)  Resp 14  Wt 109 lb 6.4 oz (49.6 kg)  SpO2 99%  BMI 18.78 kg/m2 Estimated body mass index is 18.78 kg/(m^2) as calculated from the following:    Height as of 4/13/17: 5' 4\" (1.626 m).    Weight as of this encounter: 109 lb 6.4 oz (49.6 kg).  Medication Reconciliation: complete     Estelle Palafox MA      "

## 2018-01-11 NOTE — TELEPHONE ENCOUNTER
LM for patient to remind her of appt with Samantha Saavedra NP on 1/12/17 @ 11:45am. Left clinic number for pt to call back if she needs to cancel or reschedule.

## 2018-01-11 NOTE — MR AVS SNAPSHOT
"              After Visit Summary   1/11/2018    Elizabeth Taylor    MRN: [id number]           Patient Information     Date Of Birth          1940        Visit Information        Provider Department      1/11/2018 1:30 PM Felisha Davis NP [facility]        Today's Diagnoses     Lymphadenopathy    -  1      Care Instructions    Follow up if area becomes enlarged again.           Follow-ups after your visit        Your next 10 appointments already scheduled     Jan 12, 2018 11:45 AM CST   (Arrive by 11:30 AM)   New Patient Visit with GRAHAM Beach [facility] Colon and Rectal Surgery ([facility])    [address]  4th Floor  [facility] [address] [phone]              Who to contact     If you have questions or need follow up information about today's clinic visit or your schedule please contact [facility] directly at [phone].  Normal or non-critical lab and imaging results will be communicated to you by MyChart, letter or phone within 4 business days after the clinic has received the results. If you do not hear from us within 7 days, please contact the clinic through Resource Interactivehart or phone. If you have a critical or abnormal lab result, we will notify you by phone as soon as possible.  Submit refill requests through AdventureDrop or call your pharmacy and they will forward the refill request to us. Please allow 3 business days for your refill to be completed.          Additional Information About Your Visit        Resource Interactivehart Information     AdventureDrop lets you send messages to your doctor, view your test results, renew your prescriptions, schedule appointments and more. To sign up, go to [email or web address] . Click on \"Log in\" on the left side of the screen, which will take you to the Welcome page. Then click on \"Sign up Now\" on the right side of the page.     You will be asked to enter the access code " listed below, as well as some personal information. Please follow the directions to create your username and password.     Your access code is: [email or web address]  Expires: 3/6/2018 12:13 PM     Your access code will  in 90 days. If you need help or a new code, please call your [facility] clinic or [phone].        Care EveryWhere ID     This is your Care EveryWhere ID. This could be used by other organizations to access your [facility] medical records  [id number]        Your Vitals Were     Pulse Temperature Respirations Pulse Oximetry BMI (Body Mass Index)       71 97.5  F (36.4  C) (Oral) 14 99% 18.78 kg/m2        Blood Pressure from Last 3 Encounters:   18 109/75   17 124/77   17 112/70    Weight from Last 3 Encounters:   18 109 lb 6.4 oz (49.6 kg)   17 107 lb 6 oz (48.7 kg)   17 104 lb 4 oz (47.3 kg)              Today, you had the following     No orders found for display       Primary Care Provider Office Phone # Fax #    Baron Seth [phone] [address] [address]        Equal Access to Services     IONA DALEY AH: Hadii chelo ku hadasho Soomaali, waaxda luqadaha, qaybta kaalmada adeegyada, waxpedro muñoz haynatalian tio walton . So [facility] [phone].    ATENCIÓN: Si habla español, tiene a ghosh disposición servicios gratuitos de asistencia lingüística. Llame al [phone].    We comply with applicable federal civil rights laws and [place] laws. We do not discriminate on the basis of race, color, national origin, age, disability, sex, sexual orientation, or gender identity.            Thank you!     Thank you for choosing [facility]  for your care. Our goal is always to provide you with excellent care. Hearing back from our patients is one way we can continue to improve our services. Please take a few minutes to complete the written survey that you may receive in the mail after your visit with us. Thank you!              Your Updated Medication List - Protect others around you: Learn how to safely use, store and throw away your medicines at www.disposemymeds.org.          This list is accurate as of: 1/11/18  2:02 PM.  Always use your most recent med list.                   Brand Name Dispense Instructions for use Diagnosis    hydrocortisone 2.5 % cream    ANUSOL-HC    30 g    Place rectally 2 times daily    External hemorrhoids       pramipexole 0.125 MG tablet    MIRAPEX    60 tablet    Take one tablet 2-3 hours before bedtime, may increase to 2 tablets after one week if needed    Restless leg syndrome       * TYLENOL EXTRA STRENGTH PO      1 TABLET EVERY 4 HOURS AS NEEDED        * TYLENOL PO      Take 650 mg by mouth every 8 hours as needed for mild pain or fever        * Notice:  This list has 2 medication(s) that are the same as other medications prescribed for you. Read the directions carefully, and ask your doctor or other care provider to review them with you.

## 2018-01-12 ENCOUNTER — OFFICE VISIT (OUTPATIENT)
Dept: SURGERY | Facility: CLINIC | Age: 78
End: 2018-01-12
Payer: MEDICARE

## 2018-01-12 ENCOUNTER — HOSPITAL ENCOUNTER (OUTPATIENT)
Dept: MRI IMAGING | Facility: CLINIC | Age: 78
Discharge: HOME | End: 2018-01-12
Attending: NURSE PRACTITIONER | Admitting: NURSE PRACTITIONER
Payer: MEDICARE

## 2018-01-12 ENCOUNTER — PRE VISIT (OUTPATIENT)
Dept: SURGERY | Facility: CLINIC | Age: 78
End: 2018-01-12

## 2018-01-12 VITALS
HEART RATE: 66 BPM | WEIGHT: 110 LBS | SYSTOLIC BLOOD PRESSURE: 127 MMHG | OXYGEN SATURATION: 97 % | BODY MASS INDEX: 18.78 KG/M2 | HEIGHT: 64 IN | DIASTOLIC BLOOD PRESSURE: 78 MMHG | TEMPERATURE: 97.8 F

## 2018-01-12 DIAGNOSIS — K62.9 ANAL LESION: Primary | ICD-10-CM

## 2018-01-12 DIAGNOSIS — K62.9 ANAL LESION: ICD-10-CM

## 2018-01-12 PROCEDURE — 88305 TISSUE EXAM BY PATHOLOGIST: CPT | Performed by: NURSE PRACTITIONER

## 2018-01-12 PROCEDURE — 88341 IMHCHEM/IMCYTCHM EA ADD ANTB: CPT | Performed by: NURSE PRACTITIONER

## 2018-01-12 PROCEDURE — 88342 IMHCHEM/IMCYTCHM 1ST ANTB: CPT | Performed by: NURSE PRACTITIONER

## 2018-01-12 RX ORDER — GADOBUTROL 604.72 MG/ML
7.5 INJECTION INTRAVENOUS ONCE
Status: COMPLETED | OUTPATIENT
Start: 2018-01-12 | End: 2018-01-12

## 2018-01-12 RX ADMIN — GADOBUTROL 7.5 ML: 604.72 INJECTION INTRAVENOUS at 17:03

## 2018-01-12 ASSESSMENT — PAIN SCALES - GENERAL: PAINLEVEL: NO PAIN (0)

## 2018-01-12 NOTE — NURSING NOTE
"Chief Complaint   Patient presents with     Clinic Care Coordination - Initial     New pt consult, external hemorrhoids.        Vitals:    01/12/18 1137   BP: 127/78   BP Location: Left arm   Patient Position: Chair   Cuff Size: Adult Regular   Pulse: 66   Temp: 97.8  F (36.6  C)   TempSrc: Oral   SpO2: 97%   Weight: 110 lb   Height: 5' 4\"       Body mass index is 18.88 kg/(m^2).    Sean MUELLER LPN                  "

## 2018-01-12 NOTE — PROGRESS NOTES
"Colon and Rectal Surgery Consult Clinic Note    Date: 2018     Referring provider:  Felisha Davis, CHAYITO  [address] [place] [address]     RE: Elizabeth Taylor  : 1940  SALBADOR: 2018    Elizabeth Taylor is a very pleasant 77 year old female without a significant past medical history with a recent diagnosis of hemorrhoids.  Given these findings they were subsequently sent to the Colon and Rectal Surgery Clinic for an opinion on this and a new patient consultation.     Elizabeth states that she has had problems with hemorrhoids for many years.  She noticed some bright red blood with bowel movements 4-6 years ago but this has increased over time.  She is now getting pain on the left side of her anus that is getting progressively worse.  It is painful if she wipes past it and painful when she has a bowel movement.  She has been very careful to keep her bowel movements soft with increasing her dietary fiber.  She finds it difficult to to keep clean due to the protrusion of tissue on the outside of her anus and the sensitivity when wiping around this.  She is using Tucks pads and has to keep a paper towel tucked between her buttocks to collect drainage.  She denies any change in her bowel habits.  She additionally reports that she has developed 2 \"cysts\" in her left groin that are somewhat painful.  She underwent a colonoscopy in April of last year with diagnosis of an anal fissure at that time.  She thinks she has a niece who has uterine cancer but is unaware of any other cancers in her family.  She does report that she has short-term memory loss so is often unable to recall these details.  She works as a .    Assessment/Plan: 77 year old female with anal lesion.  There is a 1.5 cm protruding lesion on the left side of her anus.  This is fairly firm on palpation and with some active bleeding.  This is concerning for a malignancy and I recommended biopsy today.  She additionally " has 2 palpable nodes in her left groin.  Given these findings, I did recommend a pelvic MRI as well.  We will follow-up with results of her biopsies for further plan. Recommended topical lidocaine and warm tub baths for comfort in the meantime and continue to avoid constipation or straining. Patient's questions were answered to her stated satisfaction and she is in agreement with this plan.    Medical history:  Past Medical History:   Diagnosis Date     Endometriosis, site unspecified      Thyroiditis, unspecified     Thryoiditis-resolved       Surgical history:  Past Surgical History:   Procedure Laterality Date     COLONOSCOPY N/A 4/13/2017    Procedure: COLONOSCOPY;  Surgeon: Juan Dos Santos MD;  Location:  GI     SURGICAL HISTORY OF -       endometriosis       Problem list:  Patient Active Problem List    Diagnosis Date Noted     Dry eye syndrome 12/05/2012     Priority: Medium     CARDIOVASCULAR SCREENING; LDL GOAL LESS THAN 160 10/31/2010     Priority: Medium       Medications:  Current Outpatient Prescriptions   Medication Sig Dispense Refill     Acetaminophen (TYLENOL PO) Take 650 mg by mouth every 8 hours as needed for mild pain or fever       hydrocortisone (ANUSOL-HC) 2.5 % cream Place rectally 2 times daily 30 g 1     pramipexole (MIRAPEX) 0.125 MG tablet Take one tablet 2-3 hours before bedtime, may increase to 2 tablets after one week if needed 60 tablet 5     TYLENOL EXTRA STRENGTH OR 1 TABLET EVERY 4 HOURS AS NEEDED         Allergies:  Allergies   Allergen Reactions     Darvon [Propoxyphene]      Had reaction in teen years     Penicillins      Ketoconazole Rash       Family history:  Family History   Problem Relation Age of Onset     CANCER Sister      CANCER Maternal Grandmother      lymphoma     HEART DISEASE Father      MI     Uterine Cancer Niece        Social history:  Social History   Substance Use Topics     Smoking status: Never Smoker     Smokeless tobacco: Never Used     Alcohol  "use No    Marital status: single.  Occupation: .    Nursing Notes:   Marc Guerrero LPN  1/12/2018 11:41 AM  Signed  Chief Complaint   Patient presents with     Clinic Care Coordination - Initial     New pt consult, external hemorrhoids.        Vitals:    01/12/18 1137   BP: 127/78   BP Location: Left arm   Patient Position: Chair   Cuff Size: Adult Regular   Pulse: 66   Temp: 97.8  F (36.6  C)   TempSrc: Oral   SpO2: 97%   Weight: 110 lb   Height: 5' 4\"       Body mass index is 18.88 kg/(m^2).    Sean MUELLER LPN                             Physical Examination:  /78 (BP Location: Left arm, Patient Position: Chair, Cuff Size: Adult Regular)  Pulse 66  Temp 97.8  F (36.6  C) (Oral)  Ht 5' 4\"  Wt 110 lb  SpO2 97%  BMI 18.88 kg/m2  General: alert oriented, in no acute distress, sitting comfortably  HEENT: mucous membranes moist  Abdomen: Soft, non distended, non tender on palpation; two palpable nodes in left groin  Perianal external examination: Exam was chaperoned by Sean Guerrero RN  1.5 cm firm lesion on the left anal verge with active bleeding and tenderness on palpation  Digital rectal examination: Was attempted but was not tolerated    Anoscopy: Was deferred in order not to cause further trauma    Procedures:  After injection with 1% lidocaine with epinephrine, biopsy was obtained using a baby Tischler forcesp and hemostasis was obtained using Monsel solution. She tolerated this well.    Total face to face time was 30 minutes, >50% counseling.    GRAHAM Jones, NP-C  Colon and Rectal Surgery   [facility]    This note was created using speech recognition software and may contain unintended word substitutions.  "

## 2018-01-12 NOTE — MR AVS SNAPSHOT
After Visit Summary   1/12/2018    Elizabeth Taylor    MRN: [id number]           Patient Information     Date Of Birth          1940        Visit Information        Provider Department      1/12/2018 11:45 AM Samantha Ghosh APRN CNP [facility] Colon and Rectal Surgery        Today's Diagnoses     Anal lesion    -  1       Follow-ups after your visit        Your next 10 appointments already scheduled     Jan 12, 2018  4:00 PM CST   (Arrive by 3:45 PM)   MR PELVIS W/O & W CONTRAST with UUMR2   [facility] [place], [facility] ([facility], [facility])    [address] [address] [phone]           Take your medicines as usual, unless your doctor tells you not to. Bring a list of your current medicines to your exam (including vitamins, minerals and over-the-counter drugs).  You will be given intravenous contrast for this exam. To prepare:   The day before your exam, drink extra fluids at least six 8-ounce glasses (unless your doctor tells you to restrict your fluids).   Have a blood test (creatinine test) within 30 days of your exam. Go to your clinic or Diagnostic Imaging Department for this test.  The MRI machine uses a strong magnet. Please wear clothes without metal (snaps, zippers). A sweatsuit works well, or we may give you a hospital gown.  Please remove any body piercings and hair extensions before you arrive. You will also remove watches, jewelry, hairpins, wallets, dentures, partial dental plates and hearing aids. You may wear contact lenses, and you may be able to wear your rings. We have a safe place to keep your personal items, but it is safer to leave them at home.   **IMPORTANT** THE INSTRUCTIONS BELOW ARE ONLY FOR THOSE PATIENTS WHO HAVE BEEN TOLD THEY WILL RECEIVE SEDATION OR GENERAL ANESTHESIA DURING THEIR MRI PROCEDURE:  IF YOU WILL RECEIVE SEDATION (take medicine to help you relax during your exam):   You  must get the medicine from your doctor before you arrive. Bring the medicine to the exam. Do not take it at home.   Arrive one hour early. Bring someone who can take you home after the test. Your medicine will make you sleepy. After the exam, you may not drive, take a bus or take a taxi by yourself.   No eating 8 hours before your exam. You may have clear liquids up until 4 hours before your exam. (Clear liquids include water, clear tea, black coffee and fruit juice without pulp.)  IF YOU WILL RECEIVE ANESTHESIA (be asleep for your exam):   Arrive 1 1/2 hours early. Bring someone who can take you home after the test. You may not drive, take a bus or take a taxi by yourself.   No eating 8 hours before your exam. You may have clear liquids up until 4 hours before your exam. (Clear liquids include water, clear tea, black coffee and fruit juice without pulp.)  Please call the Imaging Department at your exam site with any questions.              Future tests that were ordered for you today     Open Future Orders        Priority Expected Expires Ordered    MRI Pelvis - 3T Rectal Protocol STAT  1/12/2019 1/12/2018            Who to contact     Please call your clinic at [phone] to:    Ask questions about your health    Make or cancel appointments    Discuss your medicines    Learn about your test results    Speak to your doctor   If you have compliments or concerns about an experience at your clinic, or if you wish to file a complaint, please contact [facility] Patient Relations at [phone] or email us at [email or web address]         Additional Information About Your Visit        PrecisionDemand Information     PrecisionDemand is an electronic gateway that provides easy, online access to your medical records. With [employer]and, you can request a clinic appointment, read your test results, renew a prescription or communicate with your care team.     To sign up for [employer]and visit the website at  "[email or web address]   You will be asked to enter the access code listed below, as well as some personal information. Please follow the directions to create your username and password.     Your access code is: [email or web address]  Expires: 3/6/2018 12:13 PM     Your access code will  in 90 days. If you need help or a new code, please contact your [facility] or call [phone] for assistance.        Care EveryWhere ID     This is your Care EveryWhere ID. This could be used by other organizations to access your Elizabeth medical records  [id number]        Your Vitals Were     Pulse Temperature Height Pulse Oximetry BMI (Body Mass Index)       66 97.8  F (36.6  C) (Oral) 5' 4\" 97% 18.88 kg/m2        Blood Pressure from Last 3 Encounters:   18 127/78   18 109/75   17 124/77    Weight from Last 3 Encounters:   18 110 lb   18 109 lb 6.4 oz   17 107 lb 6 oz              We Performed the Following     Surgical pathology exam        Primary Care Provider Office Phone # Fax #    Baron Seth [phone] [address] [facility] [address]        Equal Access to Services     IONA DALEY AH: Hadii aad ku hadasho Soomaali, waaxda luqadaha, qaybta kaalmada adeegyada, waxay idiin hayaan tio walton . So [facility] [phone].    ATENCIÓN: Si habla español, tiene a ghosh disposición servicios gratuitos de asistencia lingüística. Jessica al [phone].    We comply with applicable federal civil rights laws and [place] laws. We do not discriminate on the basis of race, color, national origin, age, disability, sex, sexual orientation, or gender identity.            Thank you!     Thank you for choosing [facility] COLON AND RECTAL SURGERY  for your care. Our goal is always to provide you with excellent care. Hearing back from our patients is one way we can continue to improve our services. Please take a few minutes to complete the " written survey that you may receive in the mail after your visit with us. Thank you!             Your Updated Medication List - Protect others around you: Learn how to safely use, store and throw away your medicines at www.disposemymeds.org.          This list is accurate as of: 1/12/18  1:00 PM.  Always use your most recent med list.                   Brand Name Dispense Instructions for use Diagnosis    hydrocortisone 2.5 % cream    ANUSOL-HC    30 g    Place rectally 2 times daily    External hemorrhoids       pramipexole 0.125 MG tablet    MIRAPEX    60 tablet    Take one tablet 2-3 hours before bedtime, may increase to 2 tablets after one week if needed    Restless leg syndrome       * TYLENOL EXTRA STRENGTH PO      1 TABLET EVERY 4 HOURS AS NEEDED        * TYLENOL PO      Take 650 mg by mouth every 8 hours as needed for mild pain or fever        * Notice:  This list has 2 medication(s) that are the same as other medications prescribed for you. Read the directions carefully, and ask your doctor or other care provider to review them with you.

## 2018-01-12 NOTE — LETTER
"2018      RE: Elizabeth Taylor  [address]   [place] [address]       Colon and Rectal Surgery Consult Clinic Note    Date: 2018     Referring provider:  Felisha Davis, CHAYITO  [address] [place] [address]     RE: Elizabeth Taylor  : 1940  SAL[place]OR: 2018    Elizabeth Taylor is a very pleasant 77 year old female without a significant past medical history with a recent diagnosis of hemorrhoids.  Given these findings they were subsequently sent to the Colon and Rectal Surgery Clinic for an opinion on this and a new patient consultation.     Elizabeth states that she has had problems with hemorrhoids for many years.  She noticed some bright red blood with bowel movements 4-6 years ago but this has increased over time.  She is now getting pain on the left side of her anus that is getting progressively worse.  It is painful if she wipes past it and painful when she has a bowel movement.  She has been very careful to keep her bowel movements soft with increasing her dietary fiber.  She finds it difficult to to keep clean due to the protrusion of tissue on the outside of her anus and the sensitivity when wiping around this.  She is using Tucks pads and has to keep a paper towel tucked between her buttocks to collect drainage.  She denies any change in her bowel habits.  She additionally reports that she has developed 2 \"cysts\" in her left groin that are somewhat painful.  She underwent a colonoscopy in April of last year with diagnosis of an anal fissure at that time.  She thinks she has a niece who has uterine cancer but is unaware of any other cancers in her family.  She does report that she has short-term memory loss so is often unable to recall these details.  She works as a .    Assessment/Plan: 77 year old female with anal lesion.  There is a 1.5 cm protruding lesion on the left side of her anus.  This is fairly firm on palpation and with some " active bleeding.  This is concerning for a malignancy and I recommended biopsy today.  She additionally has 2 palpable nodes in her left groin.  Given these findings, I did recommend a pelvic MRI as well.  We will follow-up with results of her biopsies for further plan. Recommended topical lidocaine and warm tub baths for comfort in the meantime and continue to avoid constipation or straining. Patient's questions were answered to her stated satisfaction and she is in agreement with this plan.    Medical history:  Past Medical History:   Diagnosis Date     Endometriosis, site unspecified      Thyroiditis, unspecified     Thryoiditis-resolved       Surgical history:  Past Surgical History:   Procedure Laterality Date     COLONOSCOPY N/A 4/13/2017    Procedure: COLONOSCOPY;  Surgeon: Juan Dos Santos MD;  Location:  GI     SURGICAL HISTORY OF -       endometriosis       Problem list:  Patient Active Problem List    Diagnosis Date Noted     Dry eye syndrome 12/05/2012     Priority: Medium     CARDIOVASCULAR SCREENING; LDL GOAL LESS THAN 160 10/31/2010     Priority: Medium       Medications:  Current Outpatient Prescriptions   Medication Sig Dispense Refill     Acetaminophen (TYLENOL PO) Take 650 mg by mouth every 8 hours as needed for mild pain or fever       hydrocortisone (ANUSOL-HC) 2.5 % cream Place rectally 2 times daily 30 g 1     pramipexole (MIRAPEX) 0.125 MG tablet Take one tablet 2-3 hours before bedtime, may increase to 2 tablets after one week if needed 60 tablet 5     TYLENOL EXTRA STRENGTH OR 1 TABLET EVERY 4 HOURS AS NEEDED         Allergies:  Allergies   Allergen Reactions     Darvon [Propoxyphene]      Had reaction in teen years     Penicillins      Ketoconazole Rash       Family history:  Family History   Problem Relation Age of Onset     CANCER Sister      CANCER Maternal Grandmother      lymphoma     HEART DISEASE Father      MI     Uterine Cancer Niece        Social history:  Social History  "  Substance Use Topics     Smoking status: Never Smoker     Smokeless tobacco: Never Used     Alcohol use No    Marital status: single.  Occupation: .    Nursing Notes:   Marc Guerrero LPN  1/12/2018 11:41 AM  Signed  Chief Complaint   Patient presents with     Clinic Care Coordination - Initial     New pt consult, external hemorrhoids.        Vitals:    01/12/18 1137   BP: 127/78   BP Location: Left arm   Patient Position: Chair   Cuff Size: Adult Regular   Pulse: 66   Temp: 97.8  F (36.6  C)   TempSrc: Oral   SpO2: 97%   Weight: 110 lb   Height: 5' 4\"       Body mass index is 18.88 kg/(m^2).    Sean MUELLER LPN                             Physical Examination:  /78 (BP Location: Left arm, Patient Position: Chair, Cuff Size: Adult Regular)  Pulse 66  Temp 97.8  F (36.6  C) (Oral)  Ht 5' 4\"  Wt 110 lb  SpO2 97%  BMI 18.88 kg/m2  General: alert oriented, in no acute distress, sitting comfortably  HEENT: mucous membranes moist  Abdomen: Soft, non distended, non tender on palpation; two palpable nodes in left groin  Perianal external examination: Exam was chaperoned by Sean Guerrero RN  1.5 cm firm lesion on the left anal verge with active bleeding and tenderness on palpation  Digital rectal examination: Was attempted but was not tolerated    Anoscopy: Was deferred in order not to cause further trauma    Procedures:  After injection with 1% lidocaine with epinephrine, biopsy was obtained using a baby Tischler forcesp and hemostasis was obtained using Monsel solution. She tolerated this well.    Total face to face time was 30 minutes, >50% counseling.    GRAHAM Jones, NP-C  Colon and Rectal Surgery   [facility]    This note was created using speech recognition software and may contain unintended word substitutions.    GRAHAM Jones CNP      "

## 2018-01-16 ENCOUNTER — TELEPHONE (OUTPATIENT)
Dept: SURGERY | Facility: CLINIC | Age: 78
End: 2018-01-16

## 2018-01-16 DIAGNOSIS — C21.1 MALIGNANT NEOPLASM OF ANAL CANAL (H): Primary | ICD-10-CM

## 2018-01-16 DIAGNOSIS — C20 SQUAMOUS CELL CARCINOMA OF RECTUM (H): Primary | ICD-10-CM

## 2018-01-16 NOTE — TELEPHONE ENCOUNTER
Patient is rescheduled for PET CT 1/18/18 at 8:00 am with a 7:30 am arrival (originally scheduled for 1/17/18, but requested to reschedule).  Patient instructed to be NPO 6 hours, and provided with clinic address and location.  Patient is scheduled to see Dr. Rosas 1/20/18 at 11:00 am.  Patient confirms Saturday appointment as well.  Patient has my direct number for additional questions or concerns.

## 2018-01-17 NOTE — PROGRESS NOTES
Colon and Rectal Surgery Clinic Note    RE: Elizabeth Taylor.  : 1940.  SALBADOR: 2018.    Reason for visit: Newly-diagnosed and biopsy-proven anal SCC.    HPI: 78 y/o otherwise healthy F who recently presented to our clinic with intermittent hematochezia associated with anal pain and irritation. On exam, a 1.5-cm left-sided anal mass was seen and biopsied. Pathology showed:    FINAL DIAGNOSIS:   ANUS, LEFT LATERAL, BIOPSY:   - Invasive squamous cell carcinoma    Subsequent staging with MR and PET-CT showed:  IMPRESSION:   1.1.4 cm mass in the left anus compatible with known malignancy. This  is confined to the internal anal sphincter.   2. No definitive metastatic lymph nodes. There is a 1 cm borderline  enlarged left inguinal lymph node.  3. 2 adjacent cystic lesions are seen in the left adnexa, largest  measuring 2.5 cm with no other suspicious features. Given age, yearly  follow-up is recommended.    IMPRESSION:   1. FDG avidity of the known anal mass. This FDG avidity appears to  extend to the anal verge and potentially involve the external anal  sphincter.   2. Small left inguinal FDG avid lymph nodes concerning for metastatic  disease. Biopsy could be confirmatory.  3. Left ovarian cyst which is not FDG avid. In a postmenopausal  patient, annual surveillance is recommended (pelvic ultrasound).  4. 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   5. 4 mm right upper lobe pulmonary nodule (series 2 image 189).  6. Dilated bilateral lower extremity veins, left greater than right.  Consider evaluation for venous insufficiency.    Last colonoscopy was in 2017 and showed:  Findings:        The digital rectal exam was abnormal. Findings include rectal        tenderness. Pertinent negatives include normal sphincter tone.        A few small-mouthed diverticula were found in the sigmoid colon.        A small anal fissure was found in the anal canal.        Internal hemorrhoids were found during retroflexion  "and were small.        The terminal ileum appeared normal.     Denies h/o immunosuppression, anoreceptive intercourse, or h/o cervical or vulvar dysplasia. Currently having 3-4 small BMs per day with occasional blood. Minimal fecal seepage and very occasional fecal incontinence mainly to liquid stool. No FH of CRC or IBD.    Medical history:  Past Medical History:   Diagnosis Date     Endometriosis, site unspecified      Thyroiditis, unspecified     Thryoiditis-resolved       Surgical history:  Past Surgical History:   Procedure Laterality Date     COLONOSCOPY N/A 4/13/2017    Procedure: COLONOSCOPY;  Surgeon: Juan Dos Santos MD;  Location:  GI     SURGICAL HISTORY OF -       endometriosis       Family history:  Family History   Problem Relation Age of Onset     CANCER Sister      CANCER Maternal Grandmother      lymphoma     HEART DISEASE Father      MI     Uterine Cancer Niece        Medications:  Current Outpatient Prescriptions   Medication Sig Dispense Refill     hydrocortisone (ANUSOL-HC) 2.5 % cream Place rectally 2 times daily 30 g 1     pramipexole (MIRAPEX) 0.125 MG tablet Take one tablet 2-3 hours before bedtime, may increase to 2 tablets after one week if needed 60 tablet 5     TYLENOL EXTRA STRENGTH OR 1 TABLET EVERY 4 HOURS AS NEEDED         Allergies:  Allergies   Allergen Reactions     Darvon [Propoxyphene]      Had reaction in teen years     Penicillins      Ketoconazole Rash       Social history:   Social History   Substance Use Topics     Smoking status: Never Smoker     Smokeless tobacco: Never Used     Alcohol use No     Marital status: single.    Physical Examination:  /79 (BP Location: Left arm, Patient Position: Chair, Cuff Size: Adult Regular)  Pulse 85  Temp 98.5  F (36.9  C) (Oral)  Ht 5' 4\"  Wt 111 lb 9.6 oz  SpO2 100%  BMI 19.16 kg/m2  General: Well hydrated. No acute distress.  Abdomen: Soft, NT. Bilateral inguinal adenopathy palpated.  Perianal external " examination:  Perianal skin: intact.  Lesions: Yes: 2.5x1.5-cm ulcerated mass at the left anterolateral aspect of the anal verge.  Eversion of buttocks: There was not evidence of an anal fissure. Details: N/A.  Skin tags or external hemorrhoids: No.  Digital rectal examination: Was performed.   Sphincter tone: Good.  Palpable lesions: Yes - Location: left anterolateral mass extends into the anal canal for approx 2.5cm. Does not appear to be fixated to the anal sphincter mechanism.  Other: A small  rectocele was appreciated on bearing down.  Bimanual examination: was performed. No evidence of anovaginal fistula.    Anoscopy: Was performed.   Hemorrhoids: No significant internal hemorrhoids.  Lesions: Yes: left anterolateral mass extends into the anal canal. No addition findings.    Investigations:   None.    Procedures:  None.    ASSESSMENT  78 y/o F with newly-diagnosed T2N1 (likely N2 given palpable bilateral inguinal nodes- stage IIIA-?B) anal canal SCC. Risks, benefits, and alternatives of operative treatment were thoroughly discussed with the patient, he/she understands these well and agrees to proceed.    PLAN  1. Labs today.  2. Refer to Med and Rad Onc for CXRT.   3. Refer to Dr. Seth or Gyn for Pap and cervical exam.  4. RTC 2 months after completing CXRT for repeat PET-CT, MR, and anoscopy.    Time spent: 60 minutes.  >50% spent in discussing, counseling and coordinating care.    Emir Rosas M.D., M.Sc.     Division of Colon and Rectal Surgery  [facility]    Referring Provider:  Samantha Ghosh, GRAHAM CNP  [address]  [place] [address]     Primary Care Provider:  Baron Seth

## 2018-01-18 ENCOUNTER — RADIANT APPOINTMENT (OUTPATIENT)
Dept: PET IMAGING | Facility: CLINIC | Age: 78
End: 2018-01-18
Attending: COLON & RECTAL SURGERY
Payer: MEDICARE

## 2018-01-18 DIAGNOSIS — C21.1 MALIGNANT NEOPLASM OF ANAL CANAL (H): ICD-10-CM

## 2018-01-18 DIAGNOSIS — Z08 ENCOUNTER FOR FOLLOW-UP EXAMINATION AFTER COMPLETED TREATMENT FOR MALIGNANT NEOPLASM: ICD-10-CM

## 2018-01-18 LAB
GLUCOSE SERPL-MCNC: 105 MG/DL (ref 70–99)
RADIOLOGIST FLAGS: NORMAL

## 2018-01-19 LAB — COPATH REPORT: NORMAL

## 2018-01-20 ENCOUNTER — OFFICE VISIT (OUTPATIENT)
Dept: SURGERY | Facility: CLINIC | Age: 78
End: 2018-01-20
Payer: MEDICARE

## 2018-01-20 VITALS
HEART RATE: 85 BPM | TEMPERATURE: 98.5 F | WEIGHT: 111.6 LBS | OXYGEN SATURATION: 100 % | SYSTOLIC BLOOD PRESSURE: 144 MMHG | HEIGHT: 64 IN | DIASTOLIC BLOOD PRESSURE: 79 MMHG | BODY MASS INDEX: 19.05 KG/M2

## 2018-01-20 DIAGNOSIS — C21.1 MALIGNANT NEOPLASM OF ANAL CANAL (H): ICD-10-CM

## 2018-01-20 DIAGNOSIS — C21.1 MALIGNANT NEOPLASM OF ANAL CANAL (H): Primary | ICD-10-CM

## 2018-01-20 LAB
ALBUMIN SERPL-MCNC: 4.1 G/DL (ref 3.4–5)
ALP SERPL-CCNC: 115 U/L (ref 40–150)
ALT SERPL W P-5'-P-CCNC: 57 U/L (ref 0–50)
ANION GAP SERPL CALCULATED.3IONS-SCNC: 3 MMOL/L (ref 3–14)
AST SERPL W P-5'-P-CCNC: 44 U/L (ref 0–45)
BILIRUB SERPL-MCNC: 1.4 MG/DL (ref 0.2–1.3)
BUN SERPL-MCNC: 18 MG/DL (ref 7–30)
CALCIUM SERPL-MCNC: 9.1 MG/DL (ref 8.5–10.1)
CEA SERPL-MCNC: 2 UG/L (ref 0–2.5)
CHLORIDE SERPL-SCNC: 104 MMOL/L (ref 94–109)
CO2 SERPL-SCNC: 31 MMOL/L (ref 20–32)
CREAT SERPL-MCNC: 0.74 MG/DL (ref 0.52–1.04)
ERYTHROCYTE [DISTWIDTH] IN BLOOD BY AUTOMATED COUNT: 13 % (ref 10–15)
GFR SERPL CREATININE-BSD FRML MDRD: 76 ML/MIN/1.7M2
GLUCOSE SERPL-MCNC: 95 MG/DL (ref 70–99)
HCT VFR BLD AUTO: 43.2 % (ref 35–47)
HGB BLD-MCNC: 14 G/DL (ref 11.7–15.7)
MCH RBC QN AUTO: 31.1 PG (ref 26.5–33)
MCHC RBC AUTO-ENTMCNC: 32.4 G/DL (ref 31.5–36.5)
MCV RBC AUTO: 96 FL (ref 78–100)
PLATELET # BLD AUTO: 196 10E9/L (ref 150–450)
POTASSIUM SERPL-SCNC: 3.9 MMOL/L (ref 3.4–5.3)
PREALB SERPL IA-MCNC: 24 MG/DL (ref 15–45)
PROT SERPL-MCNC: 7.7 G/DL (ref 6.8–8.8)
RBC # BLD AUTO: 4.5 10E12/L (ref 3.8–5.2)
SODIUM SERPL-SCNC: 139 MMOL/L (ref 133–144)
WBC # BLD AUTO: 5.9 10E9/L (ref 4–11)

## 2018-01-20 PROCEDURE — 82378 CARCINOEMBRYONIC ANTIGEN: CPT | Performed by: COLON & RECTAL SURGERY

## 2018-01-20 PROCEDURE — 87389 HIV-1 AG W/HIV-1&-2 AB AG IA: CPT | Performed by: COLON & RECTAL SURGERY

## 2018-01-20 ASSESSMENT — PAIN SCALES - GENERAL: PAINLEVEL: NO PAIN (0)

## 2018-01-20 NOTE — LETTER
2018       RE: Elizabeth Taylor  [address]   [place] [address]     Dear Colleague,    Thank you for referring your patient, Elizabeth Taylor, to the [facility] COLON AND RECTAL SURGERY at [facility]. Please see a copy of my visit note below.    Colon and Rectal Surgery Clinic Note    RE: Elizabeth Taylor.  : 1940.  SALBADOR: 2018.    Reason for visit: Newly-diagnosed and biopsy-proven anal SCC.    HPI: 76 y/o otherwise healthy F who recently presented to our clinic with intermittent hematochezia associated with anal pain and irritation. On exam, a 1.5-cm left-sided anal mass was seen and biopsied. Pathology showed:    FINAL DIAGNOSIS:   ANUS, LEFT LATERAL, BIOPSY:   - Invasive squamous cell carcinoma    Subsequent staging with MR and PET-CT showed:  IMPRESSION:   1.1.4 cm mass in the left anus compatible with known malignancy. This  is confined to the internal anal sphincter.   2. No definitive metastatic lymph nodes. There is a 1 cm borderline  enlarged left inguinal lymph node.  3. 2 adjacent cystic lesions are seen in the left adnexa, largest  measuring 2.5 cm with no other suspicious features. Given age, yearly  follow-up is recommended.    IMPRESSION:   1. FDG avidity of the known anal mass. This FDG avidity appears to  extend to the anal verge and potentially involve the external anal  sphincter.   2. Small left inguinal FDG avid lymph nodes concerning for metastatic  disease. Biopsy could be confirmatory.  3. Left ovarian cyst which is not FDG avid. In a postmenopausal  patient, annual surveillance is recommended (pelvic ultrasound).  4. 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   5. 4 mm right upper lobe pulmonary nodule (series 2 image 189).  6. Dilated bilateral lower extremity veins, left greater than right.  Consider evaluation for venous insufficiency.    Last colonoscopy was in 2017 and showed:  Findings:        The  digital rectal exam was abnormal. Findings include rectal        tenderness. Pertinent negatives include normal sphincter tone.        A few small-mouthed diverticula were found in the sigmoid colon.        A small anal fissure was found in the anal canal.        Internal hemorrhoids were found during retroflexion and were small.        The terminal ileum appeared normal.     Denies h/o immunosuppression, anoreceptive intercourse, or h/o cervical or vulvar dysplasia. Currently having 3-4 small BMs per day with occasional blood. Minimal fecal seepage and very occasional fecal incontinence mainly to liquid stool. No FH of CRC or IBD.    Medical history:  Past Medical History:   Diagnosis Date     Endometriosis, site unspecified      Thyroiditis, unspecified     Thryoiditis-resolved       Surgical history:  Past Surgical History:   Procedure Laterality Date     COLONOSCOPY N/A 4/13/2017    Procedure: COLONOSCOPY;  Surgeon: Juan Dos Santos MD;  Location:  GI     SURGICAL HISTORY OF -       endometriosis       Family history:  Family History   Problem Relation Age of Onset     CANCER Sister      CANCER Maternal Grandmother      lymphoma     HEART DISEASE Father      MI     Uterine Cancer Niece        Medications:  Current Outpatient Prescriptions   Medication Sig Dispense Refill     hydrocortisone (ANUSOL-HC) 2.5 % cream Place rectally 2 times daily 30 g 1     pramipexole (MIRAPEX) 0.125 MG tablet Take one tablet 2-3 hours before bedtime, may increase to 2 tablets after one week if needed 60 tablet 5     TYLENOL EXTRA STRENGTH OR 1 TABLET EVERY 4 HOURS AS NEEDED         Allergies:  Allergies   Allergen Reactions     Darvon [Propoxyphene]      Had reaction in teen years     Penicillins      Ketoconazole Rash       Social history:   Social History   Substance Use Topics     Smoking status: Never Smoker     Smokeless tobacco: Never Used     Alcohol use No     Marital status: single.    Physical Examination:  BP  "144/79 (BP Location: Left arm, Patient Position: Chair, Cuff Size: Adult Regular)  Pulse 85  Temp 98.5  F (36.9  C) (Oral)  Ht 5' 4\"  Wt 111 lb 9.6 oz  SpO2 100%  BMI 19.16 kg/m2  General: Well hydrated. No acute distress.  Abdomen: Soft, NT. Bilateral inguinal adenopathy palpated.  Perianal external examination:  Perianal skin: intact.  Lesions: Yes: 2.5x1.5-cm ulcerated mass at the left anterolateral aspect of the anal verge.  Eversion of buttocks: There was not evidence of an anal fissure. Details: N/A.  Skin tags or external hemorrhoids: No.  Digital rectal examination: Was performed.   Sphincter tone: Good.  Palpable lesions: Yes - Location: left anterolateral mass extends into the anal canal for approx 2.5cm. Does not appear to be fixated to the anal sphincter mechanism.  Other: A small  rectocele was appreciated on bearing down.  Bimanual examination: was performed. No evidence of anovaginal fistula.    Anoscopy: Was performed.   Hemorrhoids: No significant internal hemorrhoids.  Lesions: Yes: left anterolateral mass extends into the anal canal. No addition findings.    Investigations:   None.    Procedures:  None.    ASSESSMENT  76 y/o F with newly-diagnosed T2N1 (likely N2 given palpable bilateral inguinal nodes- stage IIIA-?B) anal canal SCC. Risks, benefits, and alternatives of operative treatment were thoroughly discussed with the patient, he/she understands these well and agrees to proceed.    PLAN  1. Labs today.  2. Refer to Med and Rad Onc for CXRT.   3. Refer to Dr. Seth or Gyn for Pap and cervical exam.  4. RTC 2 months after completing CXRT for repeat PET-CT, MR, and anoscopy.    Time spent: 60 minutes.  >50% spent in discussing, counseling and coordinating care.    Emir Rosas M.D., M.Sc.     Division of Colon and Rectal Surgery  [facility]    Referring Provider:  Samantha Ghosh, APRN CNP  [address]  [place] [address]     Primary Care Provider:  Baron Seth      Again, thank you for allowing me to participate in the care of your patient.      Sincerely,    Emir Rosas MD

## 2018-01-20 NOTE — NURSING NOTE
"Chief Complaint   Patient presents with     Clinic Care Coordination - Initial     New pt consult, anal cancer.        Vitals:    01/20/18 1023   BP: 144/79   BP Location: Left arm   Patient Position: Chair   Cuff Size: Adult Regular   Pulse: 85   Temp: 98.5  F (36.9  C)   TempSrc: Oral   SpO2: 100%   Weight: 111 lb 9.6 oz   Height: 5' 4\"       Body mass index is 19.16 kg/(m^2).    Sean MUELLER LPN                     "

## 2018-01-20 NOTE — MR AVS SNAPSHOT
After Visit Summary   1/20/2018    Elizabeth Taylor    MRN: [id number]           Patient Information     Date Of Birth          1940        Visit Information        Provider Department      1/20/2018 11:00 AM Emir Rosas MD [facility] Colon and Rectal Surgery        Today's Diagnoses     Malignant neoplasm of anal canal (H)    -  1       Follow-ups after your visit        Additional Services     ONC/HEME ADULT REFERRAL           RAD ONCOLOGY REFERRAL                 Future tests that were ordered for you today     Open Future Orders        Priority Expected Expires Ordered    CBC with platelets Routine  4/17/2018 1/17/2018    Comprehensive metabolic panel Routine  4/17/2018 1/17/2018    CEA Routine  4/17/2018 1/17/2018    Prealbumin Routine  4/17/2018 1/17/2018    HIV Antigen Antibody Combo Routine  1/18/2019 1/17/2018            Who to contact     Please call your clinic at [phone] to:    Ask questions about your health    Make or cancel appointments    Discuss your medicines    Learn about your test results    Speak to your doctor   If you have compliments or concerns about an experience at your clinic, or if you wish to file a complaint, please contact [facility] Patient Relations at [phone] or email us at [email or web address]         Additional Information About Your Visit        MyChart Information     [employer] is an electronic gateway that provides easy, online access to your medical records. With [employer], you can request a clinic appointment, read your test results, renew a prescription or communicate with your care team.     To sign up for [employer] visit the website at [email or web address]   You will be asked to enter the access code listed below, as well as some personal information. Please follow the directions to create your username and password.     Your access code is: [email or web address]  Expires: 3/6/2018 12:13 PM    "  Your access code will  in 90 days. If you need help or a new code, please contact your [facility] or call [phone] for assistance.        Care EveryWhere ID     This is your Care EveryWhere ID. This could be used by other organizations to access your [facility] medical records  [id number]        Your Vitals Were     Pulse Temperature Height Pulse Oximetry BMI (Body Mass Index)       85 98.5  F (36.9  C) (Oral) 5' 4\" 100% 19.16 kg/m2        Blood Pressure from Last 3 Encounters:   18 144/79   18 127/78   18 109/75    Weight from Last 3 Encounters:   18 111 lb 9.6 oz   18 110 lb   18 109 lb 6.4 oz              We Performed the Following     ONC/HEME ADULT REFERRAL     RAD ONCOLOGY REFERRAL        Primary Care Provider Office Phone # Fax #    Baron Seth [phone] [address] [address]        Equal Access to Services     IONA DALYE : Hadii aad ku hadasho Soomaali, waaxda luqadaha, qaybta kaalmada adeegyada, cyndy muñoz hayelly walton . So [facility] [phone].    ATENCIÓN: Si habla español, tiene a ghosh disposición servicios gratuitos de asistencia lingüística. [facility] [phone].    We comply with applicable federal civil rights laws and [place] laws. We do not discriminate on the basis of race, color, national origin, age, disability, sex, sexual orientation, or gender identity.            Thank you!     Thank you for choosing [facility] COLON AND RECTAL SURGERY  for your care. Our goal is always to provide you with excellent care. Hearing back from our patients is one way we can continue to improve our services. Please take a few minutes to complete the written survey that you may receive in the mail after your visit with us. Thank you!             Your Updated Medication List - Protect others around you: Learn how to safely use, store and throw away your medicines at " www.disposemymeds.org.          This list is accurate as of: 1/20/18 11:43 AM.  Always use your most recent med list.                   Brand Name Dispense Instructions for use Diagnosis    hydrocortisone 2.5 % cream    ANUSOL-HC    30 g    Place rectally 2 times daily    External hemorrhoids       pramipexole 0.125 MG tablet    MIRAPEX    60 tablet    Take one tablet 2-3 hours before bedtime, may increase to 2 tablets after one week if needed    Restless leg syndrome       TYLENOL EXTRA STRENGTH PO      1 TABLET EVERY 4 HOURS AS NEEDED

## 2018-01-21 LAB — HIV 1+2 AB+HIV1 P24 AG SERPL QL IA: NONREACTIVE

## 2018-01-24 ENCOUNTER — TELEPHONE (OUTPATIENT)
Dept: SURGERY | Facility: CLINIC | Age: 78
End: 2018-01-24

## 2018-01-24 NOTE — TELEPHONE ENCOUNTER
Patient is scheduled to see Dr. Egan (medical oncology) 2/2/18 at 8:30 am and Dr. Madison (radiation oncology) at 10:00 am.  Left message for patient informing her of scheduled date, but requested a call back to discuss appointments in detail.  Provided my direct number.

## 2018-02-02 ENCOUNTER — ALLIED HEALTH/NURSE VISIT (OUTPATIENT)
Dept: RADIATION ONCOLOGY | Facility: CLINIC | Age: 78
End: 2018-02-02
Attending: RADIOLOGY
Payer: MEDICARE

## 2018-02-02 ENCOUNTER — ONCOLOGY VISIT (OUTPATIENT)
Dept: ONCOLOGY | Facility: CLINIC | Age: 78
End: 2018-02-02
Attending: INTERNAL MEDICINE
Payer: MEDICARE

## 2018-02-02 ENCOUNTER — APPOINTMENT (OUTPATIENT)
Dept: LAB | Facility: CLINIC | Age: 78
End: 2018-02-02
Attending: INTERNAL MEDICINE
Payer: MEDICARE

## 2018-02-02 ENCOUNTER — CARE COORDINATION (OUTPATIENT)
Dept: ONCOLOGY | Facility: CLINIC | Age: 78
End: 2018-02-02

## 2018-02-02 VITALS
WEIGHT: 111.11 LBS | RESPIRATION RATE: 16 BRPM | HEART RATE: 76 BPM | SYSTOLIC BLOOD PRESSURE: 146 MMHG | DIASTOLIC BLOOD PRESSURE: 89 MMHG | TEMPERATURE: 97.2 F | BODY MASS INDEX: 19.07 KG/M2 | OXYGEN SATURATION: 100 %

## 2018-02-02 VITALS — BODY MASS INDEX: 18.95 KG/M2 | WEIGHT: 110.4 LBS

## 2018-02-02 DIAGNOSIS — C21.1 MALIGNANT NEOPLASM OF ANAL CANAL (H): Primary | ICD-10-CM

## 2018-02-02 DIAGNOSIS — D49.9 NEOPLASM: ICD-10-CM

## 2018-02-02 DIAGNOSIS — C21.1 MALIGNANT NEOPLASM OF ANAL CANAL (H): ICD-10-CM

## 2018-02-02 LAB
ALBUMIN SERPL-MCNC: 3.7 G/DL (ref 3.4–5)
ALP SERPL-CCNC: 105 U/L (ref 40–150)
ALT SERPL W P-5'-P-CCNC: 43 U/L (ref 0–50)
ANION GAP SERPL CALCULATED.3IONS-SCNC: 6 MMOL/L (ref 3–14)
AST SERPL W P-5'-P-CCNC: 31 U/L (ref 0–45)
BASOPHILS # BLD AUTO: 0 10E9/L (ref 0–0.2)
BASOPHILS NFR BLD AUTO: 0.7 %
BILIRUB SERPL-MCNC: 1.2 MG/DL (ref 0.2–1.3)
BUN SERPL-MCNC: 18 MG/DL (ref 7–30)
CALCIUM SERPL-MCNC: 8.9 MG/DL (ref 8.5–10.1)
CHLORIDE SERPL-SCNC: 104 MMOL/L (ref 94–109)
CO2 SERPL-SCNC: 30 MMOL/L (ref 20–32)
CREAT SERPL-MCNC: 0.72 MG/DL (ref 0.52–1.04)
DIFFERENTIAL METHOD BLD: NORMAL
EOSINOPHIL # BLD AUTO: 0 10E9/L (ref 0–0.7)
EOSINOPHIL NFR BLD AUTO: 0.3 %
ERYTHROCYTE [DISTWIDTH] IN BLOOD BY AUTOMATED COUNT: 12.9 % (ref 10–15)
GFR SERPL CREATININE-BSD FRML MDRD: 78 ML/MIN/1.7M2
GLUCOSE SERPL-MCNC: 107 MG/DL (ref 70–99)
HCT VFR BLD AUTO: 40.4 % (ref 35–47)
HGB BLD-MCNC: 13.4 G/DL (ref 11.7–15.7)
IMM GRANULOCYTES # BLD: 0 10E9/L (ref 0–0.4)
IMM GRANULOCYTES NFR BLD: 0.2 %
INR PPP: 1.08 (ref 0.86–1.14)
LYMPHOCYTES # BLD AUTO: 1 10E9/L (ref 0.8–5.3)
LYMPHOCYTES NFR BLD AUTO: 16.9 %
MCH RBC QN AUTO: 31.2 PG (ref 26.5–33)
MCHC RBC AUTO-ENTMCNC: 33.2 G/DL (ref 31.5–36.5)
MCV RBC AUTO: 94 FL (ref 78–100)
MONOCYTES # BLD AUTO: 0.4 10E9/L (ref 0–1.3)
MONOCYTES NFR BLD AUTO: 6 %
NEUTROPHILS # BLD AUTO: 4.4 10E9/L (ref 1.6–8.3)
NEUTROPHILS NFR BLD AUTO: 75.9 %
NRBC # BLD AUTO: 0 10*3/UL
NRBC BLD AUTO-RTO: 0 /100
PLATELET # BLD AUTO: 166 10E9/L (ref 150–450)
POTASSIUM SERPL-SCNC: 3.9 MMOL/L (ref 3.4–5.3)
PROT SERPL-MCNC: 7 G/DL (ref 6.8–8.8)
RBC # BLD AUTO: 4.3 10E12/L (ref 3.8–5.2)
SODIUM SERPL-SCNC: 140 MMOL/L (ref 133–144)
WBC # BLD AUTO: 5.8 10E9/L (ref 4–11)

## 2018-02-02 PROCEDURE — 85610 PROTHROMBIN TIME: CPT | Performed by: INTERNAL MEDICINE

## 2018-02-02 PROCEDURE — 85025 COMPLETE CBC W/AUTO DIFF WBC: CPT | Performed by: INTERNAL MEDICINE

## 2018-02-02 PROCEDURE — 80053 COMPREHEN METABOLIC PANEL: CPT | Performed by: INTERNAL MEDICINE

## 2018-02-02 PROCEDURE — 99205 OFFICE O/P NEW HI 60 MIN: CPT | Mod: ZP | Performed by: INTERNAL MEDICINE

## 2018-02-02 PROCEDURE — G0463 HOSPITAL OUTPT CLINIC VISIT: HCPCS | Mod: 25 | Performed by: RADIOLOGY

## 2018-02-02 PROCEDURE — G0463 HOSPITAL OUTPT CLINIC VISIT: HCPCS | Mod: 25,27

## 2018-02-02 PROCEDURE — 77334 RADIATION TREATMENT AID(S): CPT | Performed by: RADIOLOGY

## 2018-02-02 PROCEDURE — 77470 SPECIAL RADIATION TREATMENT: CPT | Performed by: RADIOLOGY

## 2018-02-02 ASSESSMENT — ENCOUNTER SYMPTOMS
FEVER: 0
BLURRED VISION: 0
DYSURIA: 0
HEARTBURN: 0
NAUSEA: 0
DEPRESSION: 0
BLOOD IN STOOL: 1
NERVOUS/ANXIOUS: 0
CHILLS: 0
DIZZINESS: 1
BACK PAIN: 1
COUGH: 0

## 2018-02-02 ASSESSMENT — PAIN SCALES - GENERAL: PAINLEVEL: MILD PAIN (3)

## 2018-02-02 NOTE — PROGRESS NOTES
Presented the port book with visual of what a port looks like, provided descriptive explanation of placement, access, when to use and ongoing maintenance of port. Discussed risks of infection and blood clots. Provided [facility] Access Port information for patient to read at home.    Met with Elizabeth and her friend, to discuss chemotherapy and resources available at the [facility].  Provided patient with  My Cancer Guidebook ,ACS information on Rectal cancer, and [employer] printouts on 5-FU and Mitomycin.  Reviewed administration, side effects and ongoing symptom support management by APPs in clinic. Provided phone numbers for triage and after hours care. Highlighted steps to expect when getting chemotherapy (check in, labs, pre meds, infusion).  Discusses that chemo may be delayed by a day or two due to blood counts, infusion schedule or patient s need to modify.  Included a one page resource of symptoms and when to contact the Cancer Clinic with questions.

## 2018-02-02 NOTE — PROGRESS NOTES
DATE OF VISIT: Feb 2, 2018    REASON FOR REFERRAL:   Anal cancer    CHIEF COMPLAINT:   Chief Complaint   Patient presents with     Oncology Clinic Visit     Return for Anal Ca Lesion        HISTORY OF PRESENT ILLNESS:     77-year-old female who developed bright red blood per rectum started about 4 years ago and progressively got worse. She underwent a colonoscopy in April 2017 which noted to have small anal fissure along with internal hemorrhoids. Symptoms continued and  she was referred to colorectal surgery for further evaluation. On exam she was found to have an 1.5 cm anal lesion on the left side along with left groin palpable adenopathy. Biopsy of anal mass came back positive for squamous cell carcinoma. Patient underwent staging workup with MRI pelvis and a PET scan- showed PET avid left anal canal mass along with small left inguinal FDG avid lymph nodes.     Referred to medical oncology for evaluation and consideration of chemoradiation. She is also meeting with radiation oncology later today.    Patient is accompanied by her friend. Continues to have intermittent blood in the stools. Denies any change in bowel habits. Complains of mild to moderate pain with bowel movement and is using topical agents to help with rectal pain. Denies fatigue, weight loss, nausea/vomiting, abdominal pain, dyspnea, chest pain, bone marrow negative any other complaints.    Currently working as a . Lives alone and is single     Niece had history of colon cancer. Denies any other family history of cancer. Denies use of immunosuppressive medications, history of HIV infection, anal intercourse.    REVIEW OF SYSTEMS:    positive for short-term memory loss, balance problems which are not new   ROS: 14 point ROS neg other than the symptoms noted above in the HPI.    PAST MEDICAL HISTORY:   Past Medical History:   Diagnosis Date     Endometriosis, site unspecified      Thyroiditis, unspecified     Thryoiditis-resolved        PAST SURGICAL HISTORY:   Past Surgical History:   Procedure Laterality Date     COLONOSCOPY N/A 4/13/2017    Procedure: COLONOSCOPY;  Surgeon: Juan Dos Santos MD;  Location:  GI     SURGICAL HISTORY OF -       endometriosis       ALLERGIES:   Allergies as of 02/02/2018 - Aurelio as Reviewed 02/02/2018   Allergen Reaction Noted     Darvon [propoxyphene]  04/07/2017     Penicillins  07/31/2007     Ketoconazole Rash 12/05/2012       MEDICATIONS:   Current Outpatient Prescriptions   Medication Sig Dispense Refill     hydrocortisone (ANUSOL-HC) 2.5 % cream Place rectally 2 times daily 30 g 1     TYLENOL EXTRA STRENGTH OR 1 TABLET EVERY 4 HOURS AS NEEDED       pramipexole (MIRAPEX) 0.125 MG tablet Take one tablet 2-3 hours before bedtime, may increase to 2 tablets after one week if needed (Patient not taking: Reported on 2/2/2018) 60 tablet 5        FAMILY HISTORY:   Family History   Problem Relation Age of Onset     CANCER Sister      CANCER Maternal Grandmother      lymphoma     HEART DISEASE Father      MI     Uterine Cancer Niece        SOCIAL HISTORY:   Social History     Social History     Marital status: Single     Spouse name: N/A     Number of children: N/A     Years of education: N/A     Social History Main Topics     Smoking status: Never Smoker     Smokeless tobacco: Never Used     Alcohol use No     Drug use: No     Sexual activity: No     Other Topics Concern     None     Social History Narrative       PHYSICAL EXAMINATION:   /89  Pulse 76  Temp 97.2  F (36.2  C) (Oral)  Resp 16  Wt 50.4 kg (111 lb 1.8 oz)  SpO2 100%  BMI 19.07 kg/m2  Wt Readings from Last 10 Encounters:   02/02/18 50.4 kg (111 lb 1.8 oz)   01/20/18 50.6 kg (111 lb 9.6 oz)   01/12/18 49.9 kg (110 lb)   01/11/18 49.6 kg (109 lb 6.4 oz)   12/06/17 48.7 kg (107 lb 6 oz)   02/14/17 47.3 kg (104 lb 4 oz)   12/05/12 52.7 kg (116 lb 3.2 oz)   09/29/09 52.2 kg (115 lb)   09/15/09 52.3 kg (115 lb 3.2 oz)   07/31/07 53.1 kg  (117 lb)      ECOG performance status:0  Exam:  Constitutional: healthy, alert and no distress  Head: Normocephalic. No masses, lesions, tenderness or abnormalities  Neck: Neck supple. No adenopathy.   ENT: ENT exam normal, no neck nodes or sinus tenderness  Cardiovascular: negative, PMI normal. No lifts, heaves, or thrills. RRR.   Respiratory: negative, Lungs clear  Gastrointestinal: Abdomen soft, non-tender. BS normal. No masses, organomegaly  : Deferred  Musculoskeletal: Bilateral lower extremity swelling- chronic  Skin: no suspicious lesions or rashes  Neurologic: Gait normal. Sensation grossly WNL.  Psychiatric: mentation appears normal and affect normal/bright  Hematologic/Lymphatic/Immunologic: palatable inguinal lymph nodes b/l        LABORATORY RESULTS:    Recent Labs   Lab Test  01/20/18   1201  01/18/18   0745   NA  139   --    POTASSIUM  3.9   --    CHLORIDE  104   --    BUN  18   --    CR  0.74   --    GLC  95  105*   ELISE  9.1   --      Recent Labs   Lab Test  01/20/18   1201  03/16/17   1439  12/05/12   1611   WBC  5.9  5.1  5.4   HGB  14.0  12.5  12.4   PLT  196  187  204   MCV  96  98  91   NEUTROPHIL   --    --   77.2*     Recent Labs   Lab Test  01/20/18   1201   BILITOTAL  1.4*   ALKPHOS  115   ALT  57*   AST  44   ALBUMIN  4.1     TSH   Date Value Ref Range Status   03/16/2017 2.01 0.40 - 4.00 mU/L Final   ]    IMAGING RESULTS:  Recent Results (from the past 744 hour(s))   MRI Pelvis - 3T Rectal Protocol    Narrative    EXAMINATION: MR PELVIS W/O & W CONTRAST, 1/12/2018 5:04 PM     COMPARISON: None.    TECHNIQUE:  Images were acquired without and with intravenous contrast  through the pelvis. The following MR images were acquired without  contrast: multiplanar T1, multiplanar T2, axial diffusion-weighted and  axial apparent diffusion coefficient. T1-weighted images with fat  saturation were acquired at the following intervals relative to  intravenous contrast administration: pre-contrast,  immediate post  contrast, 1 minute, 3 minutes and delayed.  Contrast dose: 7.5mL  Gadavist    HISTORY: Anal lesion.    FINDINGS: There is a polypoid lobulated intermediate T2 signal anal  lesion predominantly along the left lateral wall from 12-8:00 position  that is approximately 1.4 cm in length and measures and measures 1.3 x  0.6 cm in transverse dimension series 13 image 21 and series 10 image  25.    EXTENT:  The tumor does involve the internal sphincter, however with preserved  intersphincteric space, external sphincter and levator ani muscle. It  is confined to the anus with no rectal involvement. It is located 1 cm  from the anal verge.     No invasion of adjacent organs.    LYMPH NODES:  No suspicious iliac chain lymph nodes. There is a left inguinal lymph  node measuring 1 cm in short axis seen on series 11 image 26 which is  slightly asymmetrically more prominent and borderline enlarged, though  the fatty hilum is maintained. The remaining inguinal lymph nodes are  unremarkable morphologically.    STAGE: N0.    VEINS:  There is not evidence of extramural venous extension.    MISCELLANEOUS FINDINGS: Heterogeneous bone marrow signal. No  suspicious bone lesions. No free fluid in the pelvis. Unremarkable  urinary bladder. Atrophic right ovary with a 5 mm cyst. Left adnexal  mass with 2 cystic structures measuring 1.7 and 2.5 cm respectively,  without contrast enhancement or restricted diffusion. Unremarkable  vaginal cuff.      Impression    IMPRESSION:   1.1.4 cm mass in the left anus compatible with known malignancy. This  is confined to the internal anal sphincter.   2. No definitive metastatic lymph nodes. There is a 1 cm borderline  enlarged left inguinal lymph node.  3. 2 adjacent cystic lesions are seen in the left adnexa, largest  measuring 2.5 cm with no other suspicious features. Given age, yearly  follow-up is recommended.    I have personally reviewed the examination and initial  interpretation  and I agree with the findings.    MESHA LAURENT MD   PET Oncology Whole Body   Result Value    Radiologist flags Pulmonary nodule    Narrative    Combined Report of:    PET and CT on  1/18/2018 9:26 AM :    1. PET of the neck, chest, abdomen, and pelvis.  2. PET CT Fusion for Attenuation Correction and Anatomical  Localization:    3. CT of the chest, abdomen and pelvis obtained for attenuation  correction interpretation.  4. 3D MIP and PET-CT fused images were processed on an independent  workstation and archived to PACS and reviewed by a radiologist.    Technique:    1. PET: The patient received 15.01 mCi of F-18-FDG; the serum glucose  was 105 prior to administration, body weight was 49.9 kg. Images were  evaluated in the axial, sagittal, and coronal planes as well as the  rotational whole body MIP. Images were acquired from the Vertex to the  Feet.    UPTAKE WAS MEASURED AT 64 MINUTES.     2. CT: Volumetric acquisition of the chest, abdomen, and pelvis  acquired at 3 mm sections for the purposes of attenuation correction.  The chest, abdomen, and pelvis were evaluated at 5 mm sections in  bone, soft tissue, and lung windows.      No oral or IV contrast was administered.  --    3. 3D MIP and PET-CT fused images were processed on an independent  workstation and archived to PACS and reviewed by a radiologist.    INDICATION: ; Malignant neoplasm of anal canal (H)    ADDITIONAL INFORMATION OBTAINED FROM EMR: none    COMPARISON: None.    FINDINGS:     HEAD/NECK:  There is no  suspicious FDG uptake in the head or neck.     No midline shift or mass effect. Paranasal sinuses and mastoid air  cells are clear.    No cervical lymphadenopathy. Thyroid is within normal limits.    CHEST:  There is no suspicious FDG uptake in the chest.     There are no pathologically enlarged mediastinal, hilar or axillary  lymph nodes.  There are no suspicious lung nodules or evidence for infection.    There are several  nodules along the right major and minor fissure  which likely represent intrafissural lymph nodes. Bibasilar  fibroatelectasis.    There is no significant pericardial or plural effusions.    ABDOMEN AND PELVIS:  There is no suspicious FDG uptake in the abdomen. 2 foci of increased  FDG uptake at the anus. One is located superior to the anal verge in  the region of the mass seen on MRI from 1/12/2018 with a max SUV of  5.7. These appear to be in continuity with each other.  2. FDG avid left inguinal lymph nodes measuring 1.2 x 1.0 cm and 7 x 6  mm (series 2 image 399 and image 400).    There are no suspicious hepatic lesions. There is no splenomegaly or  evidence for splenic or pancreatic mass lesion.    There are no suspicious adrenal mass lesions or opaque gallbladder  calculi. Ectopic position of the right kidney located in the pelvis.  Left kidney is normal in position. Prominent extrarenal pelves  bilaterally without hydronephrosis.    The origins of the no abnormally dilated small or large bowel.  Diverticulosis without evidence for diverticulitis.    Cystic structure in the left pelvis measuring 2.6 cm without increased  FDG avidity.    LOWER EXTREMITIES:   No abnormal masses or hypermetabolic lesions. Prominent superficial  veins in the bilateral lower extremities.    BONES:   There are no suspicious lytic or blastic osseous lesions.  There is no  abnormal FDG uptake in the skeleton.        Impression    IMPRESSION:   1. FDG avidity of the known anal mass. This FDG avidity appears to  extend to the anal verge and potentially involve the external anal  sphincter.   2. Small left inguinal FDG avid lymph nodes concerning for metastatic  disease. Biopsy could be confirmatory.  3. Left ovarian cyst which is not FDG avid. In a postmenopausal  patient, annual surveillance is recommended (pelvic ultrasound).  4. 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   5. 4 mm right upper lobe pulmonary nodule (series 2 image  189).  6. Dilated bilateral lower extremity veins, left greater than right.  Consider evaluation for venous insufficiency.      [Consider Follow Up: Pulmonary nodule]    This report will be copied to the [facility] to ensure a  provider acknowledges the finding.             I have personally reviewed the examination and initial interpretation  and I agree with the findings.    MENG PARTIDA MD       PATHOLOGY  SPECIMEN(S):   Anal biopsy, left lateral anal lesion     FINAL DIAGNOSIS:   ANUS, LEFT LATERAL, BIOPSY:   - Invasive squamous cell carcinoma    ASSESSMENT AND PLAN:     77-year-old female with no significant past medical history who presented with complaints of bright red blood per rectum and was recently found to have an anal mass which on biopsy came back for positive for invasive squamous cell carcinoma. Staging workup showed a PET avid anal mass along with hypermetabolic left inguinal adenopathy.    - Squamous cell carcinoma Anal canal  T2N1- IIIA  NCCN guidelines were reviewed  We discussed in detail about the prognosis associated with localized squamous cell anal cancer as well as the treatment options. Would recommend proceeding with definitive chemoradiation as it can achieve cure in many patients while preserving the sphincter. Chemotherapy regimen I will be recommending is infusional FU 1000 mg/m2 on days 1 to 4 and 29 to 32, plus mitomycin 10 mg/m2 on days 1 and 29 (as tolerated), maximum 20 mg per dose. She was informed that addition of chemotherapy  to radiation improved local control as well as progression pre-survival.  Side effect profile explained including but not limited to lowering of blood counts, infections, bleeding/bruising, nausea/vomiting, pulmonary/cardiac toxicity from mitomycin. Pertinent information about the drugs was given to the patient as well today in written form. Also recommended a port placement.   Patient understands the risks and is willing to proceed with  development of care. She is meeting with radiation oncology later today for evaluation and simulation. We'll schedule her for IR port placement next week with a tentative plan to start treatment the following week. We'll see her back in clinic on 02/16 for follow-up and assess toxicity    The patient is ready to learn, no apparent learning barriers were identified, Diagnosis and treatment plans were explained to the patient. The patient expressed understanding of the content. The patient questions were answered to her satisfaction.    Chart documentation with Dragon Voice recognition Software. Although reviewed after completion, some words and grammatical errors may remain.    Shen Ray MD  Attending Physician   Hematology/Medical Oncology

## 2018-02-02 NOTE — MR AVS SNAPSHOT
After Visit Summary   2/2/2018    Elizabeth Taylor    MRN: [id number]           Patient Information     Date Of Birth          1940        Visit Information        Provider Department      2/2/2018 10:00 AM Zaire Madison MD Radiation Oncology Clinic        Today's Diagnoses     Malignant neoplasm of anal canal (H)    -  1       Follow-ups after your visit        Your next 10 appointments already scheduled     Feb 08, 2018   Procedure with Zaire Moreira PA-C   [facility] ([facility])    [address] [place] [id number] [phone]           Located in the Clinics and Surgery Center at [address] [place] [address].   parking is very convenient and highly recommended.  is a $6 flat rate fee.  Both  and self parkers should enter the main arrival plaza from [facility]; parking attendants will direct you based on your parking preference.            Feb 08, 2018  9:45 AM CST   (Arrive by 8:15 AM)   IR CHEST PORT PLACEMENT > 5 YRS OF AGE with [id number]   [facility] ([facility])    [address] [place] [phone]              1. Your doctor will need to do a history and physical within 7 days before this procedure. 2. Your doctor will which medications should not be taken the morning of the exam. 3. Laboratory tests are to be obtained by your doctor prior to the exam (Basic Metabolic Panel, CBCP, PTT and INR) (No labs needed if you are having a tunneled catheter exchange or removal) 4. If you have allergies to x-ray contrast or iodine, contact your doctor or a Radiology nurse prior to the exam day for instructions. 5. Someone will need to drive you to and from the hospital. 6. If you are or may be pregnant, contact your doctor or a Radiology nurse prior to the day of the exam. 7. If you have diabetes, check  with your doctor or a Radiology nurse to see if your insulin needs to be adjusted for the exam. 8. If you are taking a medication called Glucophage or Glucovance; these medications need to be held the day of the exam and for approximately 48 hours following. A blood sample must be drawn so your creatinine level can be checked before resuming this medication. 9. If you are taking Coumadin (to thin you blood) please contact your doctor or a Radiology nurse at least 3 days before the exam for special instructions. 10. You should not have received contrast within 48 hours of this exam. 11. The day before your exam you may eat your regular diet and are encouraged to drink at least 2 quarts of clear liquids. Drink no alcoholic beverages for 24 hours prior to the exam. 12. If you have a colostomy you will need to irrigate it with tap water at 8PM the evening before and again at 6AM the morning of the exam. 13. Do not smoke for 24 hours prior to the procedure. 14. Birth to 4 years: - Breast feeding must be stopped 4 hours prior to exam - Solid food or formula must be stopped 6 hours prior to exam - Tube feedings must be stopped 6 hours prior to exam 15. 4-10 years old: - Nothing to eat or drink 6 hours prior to exam 16. 10+ years old: - Nothing to eat or drink 8 hours prior to exam 17. The morning of the exam you may brush your teeth and take medications as directed with a sip of water. 18. When discharged, you cannot drive until morning, and an adult must be with you until then. You should stay in the [facility] overnight. 19. Bring a list of all drugs you are taking; include supplements and over-the-counter medications. Wear comfortable clothes and leave your valuables at home.            Feb 12, 2018  9:00 AM CST   EXTERNAL RADIATION TREATMENT with [facility] RAD ONC SANIYA   Radiation Oncology Clinic ([facility])    [facility]  1st Floor  [address] [facility] [address]    [phone]            Feb 12, 2018  9:15 AM CST   ON TREATMENT VISIT with Zaire Madison MD   Radiation Oncology Clinic ([facility])    [facility]  1st Floor  [address] [facility] [address]   [phone]            Feb 12, 2018  1:00 PM CST   Masonic Lab Draw with [facility] MASONIC LAB DRAW   [facility] Masonic Lab Draw ([facility])    [address]  [address]  [facility] [address]   [phone]            Feb 12, 2018  1:30 PM CST   Infusion 120 with UC ONCOLOGY INFUSION, UC 22 ATC   [facility])    [address]  [address]  [facility] [address]   [phone]            Feb 13, 2018 10:30 AM CST   EXTERNAL RADIATION TREATMENT with [facility] RAD ONC SANIYA   Radiation Oncology Clinic ([facility])    [facility]  1st Floor  [address] [facility] [address]   [phone]            Feb 14, 2018 10:30 AM CST   EXTERNAL RADIATION TREATMENT with [facility] RAD ONC SANIYA   Radiation Oncology Clinic ([facility])    [facility]  1st Floor  [address] [facility] [address]   [phone]            Feb 15, 2018 10:30 AM CST   EXTERNAL RADIATION TREATMENT with [facility] RAD ONC SANIYA   Radiation Oncology Clinic ([facility])    [facility]  1st Floor  [address] [facility] [address]   [phone]              Who to contact     Please call your clinic at [phone] to:    Ask questions about your health    Make or cancel appointments    Discuss your medicines    Learn about your test results    Speak to your doctor   If you have compliments or concerns about an experience at your clinic, or if you wish to file a complaint, please contact [facility] Patient Relations at [phone] or email us at  [email or web address]         Additional Information About Your Visit        [facility] Information     [employer] is an electronic gateway that provides easy, online access to your medical records. With [employer], you can request a clinic appointment, read your test results, renew a prescription or communicate with your care team.     To sign up for [employer] visit the website at [email or web address]   You will be asked to enter the access code listed below, as well as some personal information. Please follow the directions to create your username and password.     Your access code is: [email or web address]  Expires: 3/6/2018 12:13 PM     Your access code will  in 90 days. If you need help or a new code, please contact your [facility] or call [phone] for assistance.        Care EveryWhere ID     This is your Care EveryWhere ID. This could be used by other organizations to access your [facility] medical records  [id number]        Your Vitals Were     BMI (Body Mass Index)                   18.95 kg/m2            Blood Pressure from Last 3 Encounters:   18 146/89   18 144/79   18 127/78    Weight from Last 3 Encounters:   18 50.1 kg (110 lb 6.4 oz)   18 50.4 kg (111 lb 1.8 oz)   18 50.6 kg (111 lb 9.6 oz)              Today, you had the following     No orders found for display       Primary Care Provider Office Phone # Fax #    Baron Dread Seth [phone]       [address]  [facility] [address]        Equal Access to Services     IONA DALEY AH: Hadii chelo Grubbs, waaxda lumariselaadaha, qaybta clairealcyndy lopez. So [facility] [phone].    ATENCIÓN: Si habla español, tiene a ghosh disposición servicios gratuitos de asistencia lingüística. Jessica al [phone].    We comply with applicable federal civil rights laws and [place] laws. We do not discriminate on the basis of race,  color, national origin, age, disability, sex, sexual orientation, or gender identity.            Thank you!     Thank you for choosing RADIATION ONCOLOGY CLINIC  for your care. Our goal is always to provide you with excellent care. Hearing back from our patients is one way we can continue to improve our services. Please take a few minutes to complete the written survey that you may receive in the mail after your visit with us. Thank you!             Your Updated Medication List - Protect others around you: Learn how to safely use, store and throw away your medicines at www.disposemymeds.org.          This list is accurate as of 2/2/18 11:59 PM.  Always use your most recent med list.                   Brand Name Dispense Instructions for use Diagnosis    hydrocortisone 2.5 % cream    ANUSOL-HC    30 g    Place rectally 2 times daily    External hemorrhoids       pramipexole 0.125 MG tablet    MIRAPEX    60 tablet    Take one tablet 2-3 hours before bedtime, may increase to 2 tablets after one week if needed    Restless leg syndrome       TYLENOL EXTRA STRENGTH PO      1 TABLET EVERY 4 HOURS AS NEEDED

## 2018-02-02 NOTE — MR AVS SNAPSHOT
After Visit Summary   2/2/2018    Elizabeth Taylor    MRN: [id number]           Patient Information     Date Of Birth          1940        Visit Information        Provider Department      2/2/2018 2:00 PM Zaire Madison MD Radiation Oncology Clinic        Today's Diagnoses     Malignant neoplasm of anal canal (H)    -  1       Follow-ups after your visit        Your next 10 appointments already scheduled     Feb 08, 2018   Procedure with Zaire Moreira PA-C   [facility] ([facility])    [address] [place] [address] [phone]           Located in the Clinics and Surgery Center at [address] [place] [address].   parking is very convenient and highly recommended.  is a $6 flat rate fee.  Both  and self parkers should enter the main arrival plaza from [facility]; parking attendants will direct you based on your parking preference.            Feb 08, 2018  9:45 AM CST   (Arrive by 8:15 AM)   IR CHEST PORT PLACEMENT > 5 YRS OF AGE with UCASCCARM6   [facility] ([facility])    [address] [place] [address]              1. Your doctor will need to do a history and physical within 7 days before this procedure. 2. Your doctor will which medications should not be taken the morning of the exam. 3. Laboratory tests are to be obtained by your doctor prior to the exam (Basic Metabolic Panel, CBCP, PTT and INR) (No labs needed if you are having a tunneled catheter exchange or removal) 4. If you have allergies to x-ray contrast or iodine, contact your doctor or a Radiology nurse prior to the exam day for instructions. 5. Someone will need to drive you to and from the hospital. 6. If you are or may be pregnant, contact your doctor or a Radiology nurse prior to the day of the exam. 7. If you have diabetes, check  with your doctor or a Radiology nurse to see if your insulin needs to be adjusted for the exam. 8. If you are taking a medication called Glucophage or Glucovance; these medications need to be held the day of the exam and for approximately 48 hours following. A blood sample must be drawn so your creatinine level can be checked before resuming this medication. 9. If you are taking Coumadin (to thin you blood) please contact your doctor or a Radiology nurse at least 3 days before the exam for special instructions. 10. You should not have received contrast within 48 hours of this exam. 11. The day before your exam you may eat your regular diet and are encouraged to drink at least 2 quarts of clear liquids. Drink no alcoholic beverages for 24 hours prior to the exam. 12. If you have a colostomy you will need to irrigate it with tap water at 8PM the evening before and again at 6AM the morning of the exam. 13. Do not smoke for 24 hours prior to the procedure. 14. Birth to 4 years: - Breast feeding must be stopped 4 hours prior to exam - Solid food or formula must be stopped 6 hours prior to exam - Tube feedings must be stopped 6 hours prior to exam 15. 4-10 years old: - Nothing to eat or drink 6 hours prior to exam 16. 10+ years old: - Nothing to eat or drink 8 hours prior to exam 17. The morning of the exam you may brush your teeth and take medications as directed with a sip of water. 18. When discharged, you cannot drive until morning, and an adult must be with you until then. You should stay in the [facility] overnight. 19. Bring a list of all drugs you are taking; include supplements and over-the-counter medications. Wear comfortable clothes and leave your valuables at home.            Feb 12, 2018  9:00 AM CST   EXTERNAL RADIATION TREATMENT with [facility] RAD ONC SANIYA   Radiation Oncology Clinic ([facility])    [facility]  1st Floor  [address] [facility] [address]    [phone]            Feb 12, 2018  9:15 AM CST   ON TREATMENT VISIT with Zaire Madison MD   Radiation Oncology Clinic ([facility])    [facility]  1st Floor  [address]  [facility] [address]   [phone]            Feb 12, 2018  1:00 PM CST   [facility]ic Lab Draw with UC MASONIC LAB DRAW   [facility] Masonic Lab Draw ([facility])    [address]  [address]  [facility] [address]   [phone]            Feb 12, 2018  1:30 PM CST   Infusion 120 with UC ONCOLOGY INFUSION, UC 22 ATC   [facility] ([facility])    [address]  [address]  [facility] [address]   [phone]            Feb 13, 2018 10:30 AM CST   EXTERNAL RADIATION TREATMENT with [facility] RAD ONC SANIYA   Radiation Oncology Clinic ([facility])    [facility]  1st Floor  [address] [facility] [address]   [phone]            Feb 14, 2018 10:30 AM CST   EXTERNAL RADIATION TREATMENT with [facility] RAD ONC SANIYA   Radiation Oncology Clinic ([facility])    [facility]  1st Floor  [address]  [facility] [address]   [phone]            Feb 15, 2018 10:30 AM CST   EXTERNAL RADIATION TREATMENT with [facility] RAD ONC SANIYA   Radiation Oncology Clinic ([facility])    [facility]  1st Floor  [address] [facility] [address]   [phone]              Future tests that were ordered for you today     Open Future Orders        Priority Expected Expires Ordered    IR Chest Port Placement > 5 Yrs of Age Routine  2/2/2019 2/2/2018            Who to contact     Please call your clinic at [phone] to:    Ask questions about your health    Make or cancel appointments    Discuss your medicines    Learn about your test results    Speak to your doctor   If you have compliments or concerns about an  experience at your clinic, or if you wish to file a complaint, please contact [facility] Patient Relations at [phone] or email us at [email or web address]         Additional Information About Your Visit        Spinal Integration[employer]ungles Jungles Information     SimplyCast is an electronic gateway that provides easy, online access to your medical records. With SimplyCast, you can request a clinic appointment, read your test results, renew a prescription or communicate with your care team.     To sign up for SimplyCast visit the website at [email or web address]   You will be asked to enter the access code listed below, as well as some personal information. Please follow the directions to create your username and password.     Your access code is: [email or web address]  Expires: 3/6/2018 12:13 PM     Your access code will  in 90 days. If you need help or a new code, please contact your [facility] Clinic or call [phone] for assistance.        Care EveryWhere ID     This is your Care EveryWhere ID. This could be used by other organizations to access your [place] medical records  [id number]         Blood Pressure from Last 3 Encounters:   18 146/89   18 144/79   18 127/78    Weight from Last 3 Encounters:   18 50.1 kg (110 lb 6.4 oz)   18 50.4 kg (111 lb 1.8 oz)   18 50.6 kg (111 lb 9.6 oz)              Today, you had the following     No orders found for display       Primary Care Provider Office Phone # Fax #    Baron Seth [phone]       [address]  [facility] [address]        Equal Access to Services     IONA DALEY AH: Hadii chelo Grubbs, waanada luqadaha, qaybta kaalmada eliseo, cyndy catalan. So [facility] [phone].    ATENCIÓN: Si habla español, tiene a ghosh disposición servicios gratuitos de asistencia lingüística. Llame al [phone].    We comply with applicable federal  civil rights laws and [place] laws. We do not discriminate on the basis of race, color, national origin, age, disability, sex, sexual orientation, or gender identity.            Thank you!     Thank you for choosing [facility] ONCOL[facility]Y CLINIC  for your care. Our goal is always to provide you with excellent care. Hearing back from our patients is one way we can continue to improve our services. Please take a few minutes to complete the written survey that you may receive in the mail after your visit with us. Thank you!             Your Updated Medication List - Protect others around you: Learn how to safely use, store and throw away your medicines at www.disposemymeds.org.          This list is accurate as of 2/2/18  4:09 PM.  Always use your most recent med list.                   Brand Name Dispense Instructions for use Diagnosis    hydrocortisone 2.5 % cream    ANUSOL-HC    30 g    Place rectally 2 times daily    External hemorrhoids       pramipexole 0.125 MG tablet    MIRAPEX    60 tablet    Take one tablet 2-3 hours before bedtime, may increase to 2 tablets after one week if needed    Restless leg syndrome       TYLENOL EXTRA STRENGTH PO      1 TABLET EVERY 4 HOURS AS NEEDED

## 2018-02-02 NOTE — MR AVS SNAPSHOT
After Visit Summary   2/2/2018    Elizabeth Taylor    MRN: [id number]           Patient Information     Date Of Birth          1940        Visit Information        Provider Department      2/2/2018 8:30 AM Shen Ray MD [facility]        Today's Diagnoses     Malignant neoplasm of anal canal (H)        Neoplasm           Care Instructions          February 2018 Sunday Monday Tuesday Wednesday Thursday Friday Saturday                       1     2     UMP NEW    8:15 AM   (60 min.)   Shen Ray MD   [facility]     UMP CONSULT   10:00 AM   (90 min.)   Zaire Madison MD   Radiation Oncology Clinic     [facility] MASONIC LAB DRAW    1:00 PM   (15 min.)   UC MASONIC LAB DRAW   [facility] Lab Draw     [facility] TCT/SIM SUITE    2:00 PM   (60 min.)   Zaire Madison MD   Radiation Oncology Clinic 3       4     5     6     7     8     Outpatient Visit    8:06 AM   [facility]     IR CHEST PORT PLACEMENT >5 YRS    8:15 AM   (75 min.)   [id number]   [facility]     INSERT PORT VASCULAR ACCESS    9:45 AM   Zaire Moreira PA-C   UC OR 9     10       11     12     UMP EXTERNAL RADIATION TREATMT   11:00 AM   (30 min.)   [facility] RAD ONC SANIYA   Radiation Oncology Clinic     [facility] ON TREATMENT VISIT   11:30 AM   (15 min.)   Zaire Madison MD   Radiation Oncology Clinic     [facility] MASONIC LAB DRAW    1:00 PM   (15 min.)   UC [facility]ONIC LAB DRAW   [facility] Lab Draw     P ONC INFUSION 120    1:30 PM   (120 min.)   UC ONCOLOGY INFUSION   [facility] 13     UMP EXTERNAL RADIATION TREATMT   10:30 AM   (15 min.)   [facility] RAD ONC SANIYA   Radiation Oncology Clinic 14     P EXTERNAL RADIATION TREATMT   10:30 AM   (15 min.)   [facility] RAD ONC SANIYA   Radiation Oncology Clinic 15     UMP EXTERNAL RADIATION TREATMT   10:30 AM   (15 min.)   [facility] RAD ONC SANIYA   [facility] Oncology Clinic 16      UMP RETURN    9:15 AM   (30 min.)   Shen Ray MD   [facility]     UMP EXTERNAL RADIATION TREATMT   10:30 AM   (15 min.)   UMP RAD ONC SANIYA   [facility] 17       18     19     [facility] EXTERNAL RADIATION TREATMT    8:30 AM   (15 min.)   UMP RAD ONC SANIYA   [facility]cology Clinic     [facility] ON TREATMENT VISIT    8:45 AM   (15 min.)   Zaire Madison MD   [facility] 20     [facility] EXTERNAL RADIATION TREATMT    8:30 AM   (15 min.)   UMP RAD ONC SANIYA   [facility] 21     [facility] EXTERNAL RADIATION TREATMT   10:30 AM   (15 min.)   UMP RAD ONC SANIYA   [facility] 22     UMP EXTERNAL RADIATION TREATMT    8:30 AM   (15 min.)   UMP RAD ONC SANIYA   [facility] 23     UMP EXTERNAL RADIATION TREATMT    8:30 AM   (15 min.)   P RAD ONC SANIYA   [facility] Clinic 24       25     26     UMP EXTERNAL RADIATION TREATMT    9:00 AM   (15 min.)   P RAD ONC SANIYA   [facility]     [facility] ON TREATMENT VISIT    9:15 AM   (15 min.)   Zaire Madison MD   [facility] 27     UMP EXTERNAL RADIATION TREATMT    9:00 AM   (15 min.)   P RAD ONC SANIYA   [facility] 28     UMP EXTERNAL RADIATION TREATMT    9:00 AM   (15 min.)   P RAD ONC SANIYA   Rad[facility] Oncology Clinic                           March 2018 Sunday Monday Tuesday Wednesday Thursday Friday Saturday                       1     UMP EXTERNAL RADIATION TREATMT    9:00 AM   (15 min.)   UMP RAD ONC SANIYA   [facility] 2     UMP EXTERNAL RADIATION TREATMT    9:00 AM   (15 min.)   UMP RAD ONC SANIYA   [facility] 3       4     5     UMP EXTERNAL RADIATION TREATMT    9:00 AM   (15 min.)   P RAD ONC SANIYA   Rad[facility] Oncology Clinic     [facility] ON TREATMENT VISIT    9:15 AM   (15 min.)   Zaire Madison MD   [facility] 6     UMP EXTERNAL RADIATION TREATMT    9:00 AM   (15 min.)   UMP RAD ONC  SANIYA   [facility] 7     UMP EXTERNAL RADIATION TREATMT    9:00 AM   (15 min.)   UMP RAD ONC SANIYA   [facility] 8     UMP EXTERNAL RADIATION TREATMT    9:00 AM   (15 min.)   UMP RAD ONC SANIYA   [facility] 9     UMP EXTERNAL RADIATION TREATMT    9:00 AM   (15 min.)   UMP RAD ONC SANIYA   [facility] 10       11     12     UMP EXTERNAL RADIATION TREATMT    9:00 AM   (15 min.)   UMP RAD ONC SANIYA   [facility]     [facility] ON TREATMENT VISIT    9:15 AM   (15 min.)   Zaire Madison MD   Radiation Oncology Clinic     [facility] MASONIC LAB DRAW   11:30 AM   (15 min.)    MASONIC LAB DRAW   [facility] Lab Draw     UMP ONC INFUSION 120   12:00 PM   (120 min.)    ONCOLOGY INFUSION   [facility] 13     UMP EXTERNAL RADIATION TREATMT    9:00 AM   (15 min.)   UMP RAD ONC SANIYA   [facility]cology Clinic 14     UMP EXTERNAL RADIATION TREATMT    9:00 AM   (15 min.)   UMP RAD ONC SANIYA   [facility] Clinic 15     UMP EXTERNAL RADIATION TREATMT    9:00 AM   (15 min.)   UMP RAD ONC SANIYA   [facility]cology Clinic 16     UMP EXTERNAL RADIATION TREATMT    9:00 AM   (15 min.)   UMP RAD ONC SANIYA   [facility] 17       18     19     UMP EXTERNAL RADIATION TREATMT    9:00 AM   (15 min.)   UMP RAD ONC SANIYA   [facility]cology Clinic     UMP ON TREATMENT VISIT    9:15 AM   (15 min.)   Zaire Madison MD   [facility] 20     UMP EXTERNAL RADIATION TREATMT    9:00 AM   (15 min.)   UMP RAD ONC SANIYA   [facility] 21     UMP EXTERNAL RADIATION TREATMT    9:00 AM   (15 min.)   UMP RAD ONC SANIYA   [facility]cology Clinic 22     UMP EXTERNAL RADIATION TREATMT    9:00 AM   (15 min.)   UMP RAD ONC SANIYA   [facility] 23     UMP EXTERNAL RADIATION TREATMT    9:00 AM   (15 min.)   UMP RAD ONC SANIYA   [facility]cology Clinic 24       25     26     27     28     29     30     31                 April 2018 Sunday Monday Tuesday Wednesday Thursday Friday Saturday   1     2     3     4     5     6     7       8     9     10     11     12     13     14       15     16     17     18     19     20     21       22     23     24     25     26     27     28       29     30                                             Follow-ups after your visit        Your next 10 appointments already scheduled     Feb 12, 2018 11:00 AM CST   EXTERNAL RADIATION TREATMENT with UMP RAD ONC SANIYA   Radiation Oncology Clinic ([facility])    [facility]  1st Floor  [address]  [facility] [address]   [phone]            Feb 12, 2018 11:30 AM CST   ON TREATMENT VISIT with Zaire Madison MD   Radiation Oncology Clinic ([facility])    [facility]  1st Floor  [address] [facility] [address]   [phone]            Feb 12, 2018  1:00 PM CST   [facility]ic Lab Draw with  [facility]AS[facility]IC LAB DRAW   [facility]    [address]  [address]  [facility] [address]   [phone]            Feb 12, 2018  1:30 PM CST   Infusion 120 with UC ONCOLOGY INFUSION, UC 22 ATC   [facility]    [address]  [address]  [facility] [address]   [phone]            Feb 13, 2018 10:30 AM CST   EXTERNAL RADIATION TREATMENT with UMP RAD ONC SANIYA   Radiation Oncology Clinic ([facility])    [facility]  1st Floor  [address] [address] [address]   [phone]            Feb 14, 2018 10:30 AM CST   EXTERNAL RADIATION TREATMENT with UMP RAD ONC SANIYA   Radiation Oncology Clinic ([facility])    [facility]  1st Floor  [address] [facility] [address]   [phone]            Feb 15, 2018 10:30 AM CST   EXTERNAL RADIATION TREATMENT with UMP RAD  "ONC SANIYA   Radiation Oncology Clinic ([facility])    [facility]  1st Floor  [address]  [facility] [address]   [phone]            Feb 16, 2018  9:30 AM CST   (Arrive by 9:15 AM)   Return Visit with Shen Ray MD   [facility] ([facility])    [address]  [address]  [facility] [address]   [phone]            Feb 16, 2018 10:30 AM CST   EXTERNAL RADIATION TREATMENT with [facility] RAD ONC SANIYA   Radiation Oncology Clinic ([facility])    [facility]  1st Floor  [address]  [facility] [address]   [phone]              Who to contact     If you have questions or need follow up information about today's clinic visit or your schedule please contact [facility] directly at [phone].  Normal or non-critical lab and imaging results will be communicated to you by MyChart, letter or phone within 4 business days after the clinic has received the results. If you do not hear from us within 7 days, please contact the clinic through [employer]Chart or phone. If you have a critical or abnormal lab result, we will notify you by phone as soon as possible.  Submit refill requests through Reclip.It or call your pharmacy and they will forward the refill request to us. Please allow 3 business days for your refill to be completed.          Additional Information About Your Visit        Reclip.It Information     Re[employer].It lets you send messages to your doctor, view your test results, renew your prescriptions, schedule appointments and more. To sign up, go to [email or web address] . Click on \"Log in\" on the left side of the screen, which will take you to the Welcome page. Then click on \"Sign up Now\" on the right side of the page.     You will be asked to enter the access code listed below, as well as some personal information. Please follow the directions to create your " username and password.     Your access code is: [email or web address]  Expires: 3/6/2018 12:13 PM     Your access code will  in 90 days. If you need help or a new code, please call your [facility] or [phone].        Care EveryWhere ID     This is your Care EveryWhere ID. This could be used by other organizations to access your [place] medical records  [id number]        Your Vitals Were     Pulse Temperature Respirations Pulse Oximetry BMI (Body Mass Index)       76 97.2  F (36.2  C) (Oral) 16 100% 19.07 kg/m2        Blood Pressure from Last 3 Encounters:   18 116/83   18 146/89   18 144/79    Weight from Last 3 Encounters:   18 48.2 kg (106 lb 4.8 oz)   18 50.1 kg (110 lb 6.4 oz)   18 50.4 kg (111 lb 1.8 oz)              We Performed the Following     CBC with platelets differential     Comprehensive metabolic panel     INR        Primary Care Provider Office Phone # Fax #    Baron Seth [phone]       [address] [facility] [address]        Equal Access to Services     IONA DALEY AH: Hadii chelo kramer hadasho Soomaali, waaxda luqadaha, qaybta kaalmada adeegyada, cyndy catalan. So [facility] [phone].    ATENCIÓN: Si habla español, tiene a ghosh disposición servicios gratuitos de asistencia lingüística. Llame al [phone].    We comply with applicable federal civil rights laws and [place] laws. We do not discriminate on the basis of race, color, national origin, age, disability, sex, sexual orientation, or gender identity.            Thank you!     Thank you for choosing [facility]  for your care. Our goal is always to provide you with excellent care. Hearing back from our patients is one way we can continue to improve our services. Please take a few minutes to complete the written survey that you may receive in the mail after your visit with us. Thank you!             Your Updated Medication List -  Protect others around you: Learn how to safely use, store and throw away your medicines at www.disposemymeds.org.          This list is accurate as of 2/2/18 11:59 PM.  Always use your most recent med list.                   Brand Name Dispense Instructions for use Diagnosis    CALCIUM 500/VITAMIN D PO           hydrocortisone 2.5 % cream    ANUSOL-HC    30 g    Place rectally 2 times daily    External hemorrhoids       pramipexole 0.125 MG tablet    MIRAPEX    60 tablet    Take one tablet 2-3 hours before bedtime, may increase to 2 tablets after one week if needed    Restless leg syndrome       TYLENOL EXTRA STRENGTH PO      1 TABLET EVERY 4 HOURS AS NEEDED        vitamin B complex with vitamin C Tabs tablet      Take 1 tablet by mouth daily

## 2018-02-02 NOTE — PROGRESS NOTES
HPI    INITIAL PATIENT ASSESSMENT    Diagnosis: anal cancer    Prior radiation therapy: None    Prior chemotherapy: None    Prior hormonal therapy:No    Pain Eval:  Denies    Psychosocial  Living arrangements: by herself, active, friend here with her today, says she has support network.    Elizabeth did say she is sometimes forgetful and wanted to record   Fall Risk: independent   referral needs: Not needed    Advanced Directive: No  Implantable Cardiac Device? No    Onset of menarche: age 14  LMP: No LMP recorded. Patient is postmenopausal.  Onset of menopause: about age 49  Abnormal vaginal bleeding/discharge: No  Are you pregnant? No  Reproductive note: none    Nurse face-to-face time: Level 5:  over 15 min face to face time  Review of Systems   Constitutional: Negative for chills and fever.   HENT: Negative for hearing loss.    Eyes: Negative for blurred vision.   Respiratory: Negative for cough.    Cardiovascular: Negative for chest pain.   Gastrointestinal: Positive for blood in stool (small amount). Negative for heartburn and nausea.   Genitourinary: Negative for dysuria.   Musculoskeletal: Positive for back pain and joint pain (arthritis).   Skin: Negative for rash.   Neurological: Positive for dizziness (and lack of balance past several weeks).   Psychiatric/Behavioral: Negative for depression. The patient is not nervous/anxious.

## 2018-02-02 NOTE — LETTER
2/2/2018       RE: Elizabeth Taylor  [address]   [place] [address]     Dear Colleague,    Thank you for referring your patient, Elizabeth Taylor, to the [facility]. Please see a copy of my visit note below.    DATE OF VISIT: Feb 2, 2018    REASON FOR REFERRAL:   Anal cancer    CHIEF COMPLAINT:   Chief Complaint   Patient presents with     Oncology Clinic Visit     Return for Anal Ca Lesion        HISTORY OF PRESENT ILLNESS:     77-year-old female with no significant past medical history develop quite red blood per rectum started about 4 years ago and progressively got worse. She underwent a colonoscopy in April 2017 which noted to have small anal fissure along with the current hemorrhoids. Symptoms continued and  she was referred to colorectal surgery for further evaluation. On exam she was found to have an 1.5 cm anal lesion on the left side along with left groin palpable adenopathy. Biopsy of anal mass came back positive for squamous cell carcinoma. Patient staging workup with MRI pelvis and a PET scan. This showed PET avid left anal canal mass along with small left inguinal FDG avid lymph nodes.     Refer to medical oncology for evaluation and consideration of chemoradiation. She is also meeting with radiation oncology later today.    Patient is accompanied by her friend. Continues to have intermittent blood in the stools. Denies any change in bowel habits. Complains of mild to moderate pain with bowel movement and is using topical agents to help with rectal pain. Denies fatigue, weight loss, nausea/vomiting, abdominal pain, dyspnea, chest pain, bone marrow negative any other complaints.    Currently working as a . Lives alone and is single     Niece had history of colon cancer. Denies any other family history of cancer. Denies use of immunosuppressive medications, history of HIV infection, anal intercourse.    REVIEW OF SYSTEMS:    positive for  short-term memory loss, balance problems which are not new   ROS: 14 point ROS neg other than the symptoms noted above in the HPI.    PAST MEDICAL HISTORY:   Past Medical History:   Diagnosis Date     Endometriosis, site unspecified      Thyroiditis, unspecified     Thryoiditis-resolved       PAST SURGICAL HISTORY:   Past Surgical History:   Procedure Laterality Date     COLONOSCOPY N/A 4/13/2017    Procedure: COLONOSCOPY;  Surgeon: Juan Dos Santos MD;  Location:  GI     SURGICAL HISTORY OF -       endometriosis       ALLERGIES:   Allergies as of 02/02/2018 - Aurelio as Reviewed 02/02/2018   Allergen Reaction Noted     Darvon [propoxyphene]  04/07/2017     Penicillins  07/31/2007     Ketoconazole Rash 12/05/2012       MEDICATIONS:   Current Outpatient Prescriptions   Medication Sig Dispense Refill     hydrocortisone (ANUSOL-HC) 2.5 % cream Place rectally 2 times daily 30 g 1     TYLENOL EXTRA STRENGTH OR 1 TABLET EVERY 4 HOURS AS NEEDED       pramipexole (MIRAPEX) 0.125 MG tablet Take one tablet 2-3 hours before bedtime, may increase to 2 tablets after one week if needed (Patient not taking: Reported on 2/2/2018) 60 tablet 5        FAMILY HISTORY:   Family History   Problem Relation Age of Onset     CANCER Sister      CANCER Maternal Grandmother      lymphoma     HEART DISEASE Father      MI     Uterine Cancer Niece        SOCIAL HISTORY:   Social History     Social History     Marital status: Single     Spouse name: N/A     Number of children: N/A     Years of education: N/A     Social History Main Topics     Smoking status: Never Smoker     Smokeless tobacco: Never Used     Alcohol use No     Drug use: No     Sexual activity: No     Other Topics Concern     None     Social History Narrative       PHYSICAL EXAMINATION:   /89  Pulse 76  Temp 97.2  F (36.2  C) (Oral)  Resp 16  Wt 50.4 kg (111 lb 1.8 oz)  SpO2 100%  BMI 19.07 kg/m2  Wt Readings from Last 10 Encounters:   02/02/18 50.4 kg (111 lb 1.8  oz)   01/20/18 50.6 kg (111 lb 9.6 oz)   01/12/18 49.9 kg (110 lb)   01/11/18 49.6 kg (109 lb 6.4 oz)   12/06/17 48.7 kg (107 lb 6 oz)   02/14/17 47.3 kg (104 lb 4 oz)   12/05/12 52.7 kg (116 lb 3.2 oz)   09/29/09 52.2 kg (115 lb)   09/15/09 52.3 kg (115 lb 3.2 oz)   07/31/07 53.1 kg (117 lb)      ECOG performance status:0  Exam:  Constitutional: healthy, alert and no distress  Head: Normocephalic. No masses, lesions, tenderness or abnormalities  Neck: Neck supple. No adenopathy.   ENT: ENT exam normal, no neck nodes or sinus tenderness  Cardiovascular: negative, PMI normal. No lifts, heaves, or thrills. RRR.   Respiratory: negative, Lungs clear  Gastrointestinal: Abdomen soft, non-tender. BS normal. No masses, organomegaly  : Deferred  Musculoskeletal: Bilateral lower extremity swelling- chronic  Skin: no suspicious lesions or rashes  Neurologic: Gait normal. Sensation grossly WNL.  Psychiatric: mentation appears normal and affect normal/bright  Hematologic/Lymphatic/Immunologic: palatable inguinal lymph nodes b/l        LABORATORY RESULTS:    Recent Labs   Lab Test  01/20/18   1201  01/18/18   0745   NA  139   --    POTASSIUM  3.9   --    CHLORIDE  104   --    BUN  18   --    CR  0.74   --    GLC  95  105*   ELISE  9.1   --      Recent Labs   Lab Test  01/20/18   1201  03/16/17   1439  12/05/12   1611   WBC  5.9  5.1  5.4   HGB  14.0  12.5  12.4   PLT  196  187  204   MCV  96  98  91   NEUTROPHIL   --    --   77.2*     Recent Labs   Lab Test  01/20/18   1201   BILITOTAL  1.4*   ALKPHOS  115   ALT  57*   AST  44   ALBUMIN  4.1     TSH   Date Value Ref Range Status   03/16/2017 2.01 0.40 - 4.00 mU/L Final   ]    IMAGING RESULTS:  Recent Results (from the past 744 hour(s))   MRI Pelvis - 3T Rectal Protocol    Narrative    EXAMINATION: MR PELVIS W/O & W CONTRAST, 1/12/2018 5:04 PM     COMPARISON: None.    TECHNIQUE:  Images were acquired without and with intravenous contrast  through the pelvis. The following MR  images were acquired without  contrast: multiplanar T1, multiplanar T2, axial diffusion-weighted and  axial apparent diffusion coefficient. T1-weighted images with fat  saturation were acquired at the following intervals relative to  intravenous contrast administration: pre-contrast, immediate post  contrast, 1 minute, 3 minutes and delayed.  Contrast dose: 7.5mL  Gadavist    HISTORY: Anal lesion.    FINDINGS: There is a polypoid lobulated intermediate T2 signal anal  lesion predominantly along the left lateral wall from 12-8:00 position  that is approximately 1.4 cm in length and measures and measures 1.3 x  0.6 cm in transverse dimension series 13 image 21 and series 10 image  25.    EXTENT:  The tumor does involve the internal sphincter, however with preserved  intersphincteric space, external sphincter and levator ani muscle. It  is confined to the anus with no rectal involvement. It is located 1 cm  from the anal verge.     No invasion of adjacent organs.    LYMPH NODES:  No suspicious iliac chain lymph nodes. There is a left inguinal lymph  node measuring 1 cm in short axis seen on series 11 image 26 which is  slightly asymmetrically more prominent and borderline enlarged, though  the fatty hilum is maintained. The remaining inguinal lymph nodes are  unremarkable morphologically.    STAGE: N0.    VEINS:  There is not evidence of extramural venous extension.    MISCELLANEOUS FINDINGS: Heterogeneous bone marrow signal. No  suspicious bone lesions. No free fluid in the pelvis. Unremarkable  urinary bladder. Atrophic right ovary with a 5 mm cyst. Left adnexal  mass with 2 cystic structures measuring 1.7 and 2.5 cm respectively,  without contrast enhancement or restricted diffusion. Unremarkable  vaginal cuff.      Impression    IMPRESSION:   1.1.4 cm mass in the left anus compatible with known malignancy. This  is confined to the internal anal sphincter.   2. No definitive metastatic lymph nodes. There is a 1 cm  borderline  enlarged left inguinal lymph node.  3. 2 adjacent cystic lesions are seen in the left adnexa, largest  measuring 2.5 cm with no other suspicious features. Given age, yearly  follow-up is recommended.    I have personally reviewed the examination and initial interpretation  and I agree with the findings.    MESHA LAURENT MD   PET Oncology Whole Body   Result Value    Radiologist flags Pulmonary nodule    Narrative    Combined Report of:    PET and CT on  1/18/2018 9:26 AM :    1. PET of the neck, chest, abdomen, and pelvis.  2. PET CT Fusion for Attenuation Correction and Anatomical  Localization:    3. CT of the chest, abdomen and pelvis obtained for attenuation  correction interpretation.  4. 3D MIP and PET-CT fused images were processed on an independent  workstation and archived to PACS and reviewed by a radiologist.    Technique:    1. PET: The patient received 15.01 mCi of F-18-FDG; the serum glucose  was 105 prior to administration, body weight was 49.9 kg. Images were  evaluated in the axial, sagittal, and coronal planes as well as the  rotational whole body MIP. Images were acquired from the Vertex to the  Feet.    UPTAKE WAS MEASURED AT 64 MINUTES.     2. CT: Volumetric acquisition of the chest, abdomen, and pelvis  acquired at 3 mm sections for the purposes of attenuation correction.  The chest, abdomen, and pelvis were evaluated at 5 mm sections in  bone, soft tissue, and lung windows.      No oral or IV contrast was administered.  --    3. 3D MIP and PET-CT fused images were processed on an independent  workstation and archived to PACS and reviewed by a radiologist.    INDICATION: ; Malignant neoplasm of anal canal (H)    ADDITIONAL INFORMATION OBTAINED FROM EMR: none    COMPARISON: None.    FINDINGS:     HEAD/NECK:  There is no  suspicious FDG uptake in the head or neck.     No midline shift or mass effect. Paranasal sinuses and mastoid air  cells are clear.    No cervical  lymphadenopathy. Thyroid is within normal limits.    CHEST:  There is no suspicious FDG uptake in the chest.     There are no pathologically enlarged mediastinal, hilar or axillary  lymph nodes.  There are no suspicious lung nodules or evidence for infection.    There are several nodules along the right major and minor fissure  which likely represent intrafissural lymph nodes. Bibasilar  fibroatelectasis.    There is no significant pericardial or plural effusions.    ABDOMEN AND PELVIS:  There is no suspicious FDG uptake in the abdomen. 2 foci of increased  FDG uptake at the anus. One is located superior to the anal verge in  the region of the mass seen on MRI from 1/12/2018 with a max SUV of  5.7. These appear to be in continuity with each other.  2. FDG avid left inguinal lymph nodes measuring 1.2 x 1.0 cm and 7 x 6  mm (series 2 image 399 and image 400).    There are no suspicious hepatic lesions. There is no splenomegaly or  evidence for splenic or pancreatic mass lesion.    There are no suspicious adrenal mass lesions or opaque gallbladder  calculi. Ectopic position of the right kidney located in the pelvis.  Left kidney is normal in position. Prominent extrarenal pelves  bilaterally without hydronephrosis.    The origins of the no abnormally dilated small or large bowel.  Diverticulosis without evidence for diverticulitis.    Cystic structure in the left pelvis measuring 2.6 cm without increased  FDG avidity.    LOWER EXTREMITIES:   No abnormal masses or hypermetabolic lesions. Prominent superficial  veins in the bilateral lower extremities.    BONES:   There are no suspicious lytic or blastic osseous lesions.  There is no  abnormal FDG uptake in the skeleton.        Impression    IMPRESSION:   1. FDG avidity of the known anal mass. This FDG avidity appears to  extend to the anal verge and potentially involve the external anal  sphincter.   2. Small left inguinal FDG avid lymph nodes concerning for  metastatic  disease. Biopsy could be confirmatory.  3. Left ovarian cyst which is not FDG avid. In a postmenopausal  patient, annual surveillance is recommended (pelvic ultrasound).  4. 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   5. 4 mm right upper lobe pulmonary nodule (series 2 image 189).  6. Dilated bilateral lower extremity veins, left greater than right.  Consider evaluation for venous insufficiency.      [Consider Follow Up: Pulmonary nodule]    This report will be copied to the [facility] to ensure a  provider acknowledges the finding.             I have personally reviewed the examination and initial interpretation  and I agree with the findings.    MENG PARTIDA MD       PATHOLOGY  SPECIMEN(S):   Anal biopsy, left lateral anal lesion     FINAL DIAGNOSIS:   ANUS, LEFT LATERAL, BIOPSY:   - Invasive squamous cell carcinoma    ASSESSMENT AND PLAN:     77-year-old female with no significant past medical history who presented with complaints of bright red blood per rectum and was recently found to have an anal mass which on biopsy came back for positive for invasive squamous cell carcinoma. Staging workup showed a PET avid anal mass along with hypermetabolic left inguinal adenopathy.    - Squamous cell carcinoma Anal canal  T2N1- IIIA  NCCN guidelines were reviewed  We discussed in detail about the prognosis associated with localized squamous cell anal cancer as well as the treatment options. Would recommend proceeding with definitive chemoradiation as it can achieve cure in many patients while preserving the sphincter. Chemotherapy regimen I will be recommending is infusional FU 1000 mg/m2 on days 1 to 4 and 29 to 32, plus mitomycin 10 mg/m2 on days 1 and 29 (as tolerated), maximum 20 mg per dose. She was informed that addition of chemotherapy  to radiation improved local control as well as progression pre-survival.  Side effect profile explained including but not limited to lowering of blood  counts, infections, bleeding/bruising, nausea/vomiting, pulmonary/cardiac toxicity from mitomycin. Pertinent information about the drugs was given to the patient as well today in written form. Also recommended a port placement.   Patient understands the risks and is willing to proceed with development of care. She is meeting with radiation oncology later today for evaluation and simulation. We'll schedule her for IR port placement next week with a tentative plan to start treatment the following week. We'll see her back in clinic on 02/16 for follow-up and assess toxicity    The patient is ready to learn, no apparent learning barriers were identified, Diagnosis and treatment plans were explained to the patient. The patient expressed understanding of the content. The patient questions were answered to her satisfaction.    Chart documentation with Dragon Voice recognition Software. Although reviewed after completion, some words and grammatical errors may remain.    Shen Ray MD  Attending Physician   Hematology/Medical Oncology

## 2018-02-02 NOTE — LETTER
2018       RE: Elizabeth Taylor  [address]   [place] [address]     Dear Colleague,    Thank you for referring your patient, Elizabeth Taylor, to the RADIATION ONCOLOGY CLINIC. Please see a copy of my visit note below.      HPI    INITIAL PATIENT ASSESSMENT    Diagnosis: anal cancer    Prior radiation therapy: None    Prior chemotherapy: None    Prior hormonal therapy:No    Pain Eval:  Denies    Psychosocial  Living arrangements: by herself, active, friend here with her today, says she has support network.    Elizabeth did say she is sometimes forgetful and wanted to record   Fall Risk: independent   referral needs: Not needed    Advanced Directive: No  Implantable Cardiac Device? No    Onset of menarche: age 14  LMP: No LMP recorded. Patient is postmenopausal.  Onset of menopause: about age 49  Abnormal vaginal bleeding/discharge: No  Are you pregnant? No  Reproductive note: none    Nurse face-to-face time: Level 5:  over 15 min face to face time  Review of Systems   Constitutional: Negative for chills and fever.   HENT: Negative for hearing loss.    Eyes: Negative for blurred vision.   Respiratory: Negative for cough.    Cardiovascular: Negative for chest pain.   Gastrointestinal: Positive for blood in stool (small amount). Negative for heartburn and nausea.   Genitourinary: Negative for dysuria.   Musculoskeletal: Positive for back pain and joint pain (arthritis).   Skin: Negative for rash.   Neurological: Positive for dizziness (and lack of balance past several weeks).   Psychiatric/Behavioral: Negative for depression. The patient is not nervous/anxious.                     Department of Radiation Oncology  [facility]  [address] [place] [address]  [phone]       Consultation Note    Name: Elizabeth Taylor MRN: [id number]   : 1940   Date of Service: 2018  Referring: Dr. 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 She reports noticing increased anal sensitivity and small amounts of bright red blood per rectum associated with a small protruding mass in March, 2017. She underwent a colonoscopy in April, 2017 at which time she was noted to have a small anal fissure, a few small-mouthed diverticula in the sigmoid colon, and small internal hemorrhoids on retroflexion. Her symptoms continued and she was referred to colorectal surgery. A 1.5 cm left-sided anal mass was noted at that time and was biopsied on 1/12/18. Pathology showed invasive squamous cell carcinoma ().     MRI pelvis on 1/12/18 showed a polypoid lobulated intermediate T2 signal anal lesion predominantly along the left lateral wall from 12-8:00 position measuring 1.4 x 1.3 x 0.6 cm . The tumor involved the internal sphincter, however with preserved intersphincteric space, external sphincter and levator ani muscle. There was no rectal involvement. It was located 1 cm from the anal verge. No definitive metastatic lymph nodes but there was a 1 cm borderline enlarged left inguinal lymph node.    PET/CT on 1/18/18 showed foci of increased FDG uptake at the anus (SUV max 5.7) as well as two FDG avid left inguinal lymph nodes measuring 1.2 x 1.0 cm and 7 x 6 mm. There was otherwise no evidence of distant metastatic disease.     She saw Dr. Rosas of colorectal surgery on 1/20/18. Physical exam at that time demonstrated a 2.5 x 1.5 cm ulcerated mass at the left anterolateral aspect of the anal verge. This mass was noted to extend into the anal canal for approximately 2.5 cm and did not appear to be fixated to the anal sector mechanism. Bilateral palpable inguinal adenopathy was noted at this time as well. Anoscopy showed no significant internal hemorrhoids but confirmed the  presence of the left anterolateral mass extending into the canal.  She was therefore referred to our department for consideration of definitive chemoradiation.    On exam today, Ms. Taylor reports that she has been overall doing well since her cancer diagnosis. She has no pain at rest and very minimal pain with wiping (1/10). She does say that she has been being very careful when wiping and she would have a lot more if she were more aggressive. She denies bleeding other than a small amount on the toilet paper after wiping. She endorses some worsened balance and dizziness since around [place], but she otherwise denies other symptoms. She has not had any significant weight loss over the past 6 months. Bowel movements are normal. A complete ROS was otherwise unremarkable.    Past Medical History:   Past Medical History:   Diagnosis Date     Endometriosis, site unspecified      Thyroiditis, unspecified     Thryoiditis-resolved       Past Surgical History:   Past Surgical History:   Procedure Laterality Date     APPENDECTOMY      as a child     COLONOSCOPY N/A 4/13/2017    Procedure: COLONOSCOPY;  Surgeon: Juan Dos Santos MD;  Location:  GI     SURGICAL HISTORY OF -       endometriosis       Chemotherapy History:  None    Radiation History:  None    Pregnant: No  Implanted Cardiac Devices: No    Medications:  Current Outpatient Prescriptions   Medication     hydrocortisone (ANUSOL-HC) 2.5 % cream     pramipexole (MIRAPEX) 0.125 MG tablet     TYLENOL EXTRA STRENGTH OR     Allergies:  Allergies   Allergen Reactions     Darvon [Propoxyphene]      Had reaction in teen years     Penicillins      As a child     Ketoconazole Rash       Social History:  Tobacco: Never smoker  Alcohol: None  Employment: Works as a  part time  Social History     Social History     Marital status: Single     Spouse name: N/A     Number of children: N/A     Years of education: N/A     Occupational History     Not on file.      Social History Main Topics     Smoking status: Never Smoker     Smokeless tobacco: Never Used     Alcohol use No     Drug use: No     Sexual activity: No       Family History:  Family History   Problem Relation Age of Onset     CANCER Sister      CANCER Maternal Grandmother      lymphoma     HEART DISEASE Father      MI     Uterine Cancer Niece        Review of Systems   A 10-point review of systems was performed. Pertinent findings are noted in the HPI.    Physical Exam   ECOG Status: 1    Vitals:  Weight: 50.1 kg  Pain: 1/10    Gen: Alert, in NAD  Head: NC/AT  Eyes: PERRL, EOMI, sclera anicteric  Ears: No external auricular lesions  Nose/sinus: No rhinorrhea or epistaxis  Pulm: No wheezing, stridor or respiratory distress  CV: Extremities are warm and well-perfused, no cyanosis, no pedal edema  Abdominal: Normal bowel sounds, soft, nontender, no masses  Pelvis/: Normal female external genitalia. Speculum examination reveals normal-appearing vaginal mucosa without any lesions. There is an approximately 1.5 cm tender, erythematous mass without active bleeding protruding from the anal canal. On further inspection, this appears to be arising from the left posterolateral aspect of the anus. There is extension into the anal canal with an approximate total length of the palpable abnormality of 2-2.5 cm. Normal rectal tone. No blood on the exam glove. There is a 1 cm firm but mobile lymph node palpated within the left inguinal region with no right sided adenopathy appreciated.   Musculoskeletal: Diffusely diminished muscle bulk. Normal tone.  Skin: Normal color and turgor  Neuro: A/Ox3, CN II-XII intact, normal gait    Imaging/Path/Labs   Imaging: See HPI    Path:   Patient Name: GERHARD SU   MR#: [id number]   Specimen #:    Collected: 1/12/2018   Received: 1/12/2018   Reported: 1/16/2018 10:37   Ordering Phy(s): REAGAN COOPER     IMMUNOPATHOLOGY-Addendum   Status: Signed Out   Date  Ordered:1/19/2018   Date Reported:1/19/2018 17:52   Signed Out By: Milly Rodriguez M.D., [facility]     INTERPRETATION:   Confirmatory immunohistochemistry was performed with appropriate control   reactions.  Lesional cells are   positive for p40, patchy positive for CK 5/6, while negative for   chromogranin and synaptophysin.     Original diagnosis remains the same.     ORIGINAL REPORT:     SPECIMEN(S):   Anal biopsy, left lateral anal lesion     FINAL DIAGNOSIS:   ANUS, LEFT LATERAL, BIOPSY:   - Invasive squamous cell carcinoma     Labs:   CEA 2.0  CBC wnl  INR 1.08  CMP wnl    Assessment    Ms. 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    Plan   We recommend definitive chemoradiation for the treatment of Ms. Taylor's recently diagnosed anal cancer.    We discussed that concurrent chemoradiation is the current standard of care in the treatment of locally advanced anal cancer. The patient has been evaluated by GI surgery and it was determined that resection is not appropriate given the extent of her disease and poor functional outcome predicted following surgery. We will therefore treat her using IMRT as outlined by RTOG 0529 with concurrent 5-FU and mitomycin C per recommendations by medical oncology. Randomized trials have shown superiority with regard to local control and anal cancer-specific survival with the addition of chemotherapy compared to radiation alone. Treatment will consist of approximately 6 weeks of daily treatments to the anal mass as well as at-risk siomara areas using dose painting. We reviewed the logistics of this treatment as well as the expected acute toxicity and potential late side effects. The patient had a number of questions which were answered to the best of our ability. Informed consent was obtained and the patient underwent a simulation CT in our department today. She is tentatively scheduled to begin treatment on Monday 2/12/18.     The  patient was seen and discussed with staff, Dr. Madison.    Juan Don MD  Resident, PGY-3  Department of Radiation [facility]  [facility]  [phone]      Attending addendum:   I saw and examined the patient with the resident and agree with the documented plan of care.    In brief, this a 77-year-old female with a newly-diagnosed squamous cell carcinoma of the anal canal. I proposed a treatment course consisting of 54 Gy delivered in 30 daily fractions to the disease within the anal canal with additional elective coverage of the suspected metastatic disease in the left inguinal region and the high-risk draining lymphatics (right inguinal and pelvis). This will be delivered with concurrent mitomycin-C/5-FU per the recommendations by medical oncology. Ms. Taylor does not have the most robust support system as she lives alone and, given her age and functional status, I am very concerned about her ability to tolerate full-course chemoradiotherapy. I believe that she will likely require inpatient admission for medical management towards the end of treatment.    Informed consent was obtained in clinic and a CT-simulation session was performed for radiotherapy planning purposes following examination. I will tentatively plan to initiate therapy on 2/12/2018 in coordination with commencement of her planned chemotherapy. In the interim, Ms. Taylor was instructed to contact our clinic with any additional questions or concerns.    Zaire Madison MD/PhD  Dept of Radiation Oncology  [facility]

## 2018-02-02 NOTE — NURSING NOTE
"Oncology Rooming Note    February 2, 2018 8:03 AM   Elizabeth Taylor is a 77 year old female who presents for:    Chief Complaint   Patient presents with     Oncology Clinic Visit     Return for Anal Ca Lesion      Initial Vitals: /89  Pulse 76  Temp 97.2  F (36.2  C) (Oral)  Resp 16  Wt 50.4 kg (111 lb 1.8 oz)  SpO2 100%  BMI 19.07 kg/m2 Estimated body mass index is 19.07 kg/(m^2) as calculated from the following:    Height as of 1/20/18: 1.626 m (5' 4\").    Weight as of this encounter: 50.4 kg (111 lb 1.8 oz). Body surface area is 1.51 meters squared.  Mild Pain (3) Comment: Data Unavailable   No LMP recorded. Patient is postmenopausal.  Allergies reviewed: Yes  Medications reviewed: Yes    Medications: Medication refills not needed today.  Pharmacy name entered into [facility]: [facility] - [place], [place] - [address]    Clinical concerns: results  Cory was notified.    7 minutes for nursing intake (face to face time)     Gloria Gregory MA              "

## 2018-02-02 NOTE — PROGRESS NOTES
Radiation Therapy Patient Education    Person involved with teaching: Patient and friend Katherine    Patient educational needs for self management of treatment-related side effects assessment completed.  [facility] Patient Ed tab contains Patient Learning Assessment    Education Materials Given  Skin Care During Radiation Treatment, Coping with Diarrhea and Sitz baths/perineal care/Proshield ointment    Educational Topics Discussed  Side effects expected, Pain management, Skin care, Nutrition and weight loss and     Response To Teaching  Verbalizes understanding- Elizabeth did admit to being forgetful, she recorded our conversation.      GYN Only  Vaginal Dilator-given and educated: not given per Dr. Madison    Referrals sent: None    Chemotherapy?  Yes: Already scheduled for 5FU infusion 96 hours with Mitomycin C.  She has appointment for port placement.

## 2018-02-07 ENCOUNTER — ANESTHESIA EVENT (OUTPATIENT)
Dept: SURGERY | Facility: AMBULATORY SURGERY CENTER | Age: 78
End: 2018-02-07

## 2018-02-07 RX ORDER — METHYLPREDNISOLONE SODIUM SUCCINATE 125 MG/2ML
125 INJECTION, POWDER, LYOPHILIZED, FOR SOLUTION INTRAMUSCULAR; INTRAVENOUS
Status: CANCELLED
Start: 2018-02-12

## 2018-02-07 RX ORDER — MITOMYCIN 5 MG/10ML
10 INJECTION, POWDER, LYOPHILIZED, FOR SOLUTION INTRAVENOUS ONCE
Status: CANCELLED
Start: 2018-02-12

## 2018-02-07 RX ORDER — EPINEPHRINE 1 MG/ML
0.3 INJECTION, SOLUTION, CONCENTRATE INTRAVENOUS EVERY 5 MIN PRN
Status: CANCELLED | OUTPATIENT
Start: 2018-02-12

## 2018-02-07 RX ORDER — ALBUTEROL SULFATE 90 UG/1
1-2 AEROSOL, METERED RESPIRATORY (INHALATION)
Status: CANCELLED
Start: 2018-02-12

## 2018-02-07 RX ORDER — DIPHENHYDRAMINE HYDROCHLORIDE 50 MG/ML
50 INJECTION INTRAMUSCULAR; INTRAVENOUS
Status: CANCELLED
Start: 2018-02-12

## 2018-02-07 RX ORDER — ALBUTEROL SULFATE 0.83 MG/ML
2.5 SOLUTION RESPIRATORY (INHALATION)
Status: CANCELLED | OUTPATIENT
Start: 2018-02-12

## 2018-02-07 RX ORDER — MEPERIDINE HYDROCHLORIDE 25 MG/ML
25 INJECTION INTRAMUSCULAR; INTRAVENOUS; SUBCUTANEOUS EVERY 30 MIN PRN
Status: CANCELLED | OUTPATIENT
Start: 2018-02-12

## 2018-02-07 RX ORDER — SODIUM CHLORIDE 9 MG/ML
1000 INJECTION, SOLUTION INTRAVENOUS CONTINUOUS PRN
Status: CANCELLED
Start: 2018-02-12

## 2018-02-07 RX ORDER — LORAZEPAM 2 MG/ML
0.5 INJECTION INTRAMUSCULAR EVERY 4 HOURS PRN
Status: CANCELLED
Start: 2018-02-12

## 2018-02-07 RX ORDER — EPINEPHRINE 0.3 MG/.3ML
0.3 INJECTION SUBCUTANEOUS EVERY 5 MIN PRN
Status: CANCELLED | OUTPATIENT
Start: 2018-02-12

## 2018-02-08 ENCOUNTER — HOSPITAL ENCOUNTER (OUTPATIENT)
Facility: AMBULATORY SURGERY CENTER | Age: 78
End: 2018-02-08
Attending: PHYSICIAN ASSISTANT
Payer: MEDICARE

## 2018-02-08 ENCOUNTER — ANESTHESIA (OUTPATIENT)
Dept: SURGERY | Facility: AMBULATORY SURGERY CENTER | Age: 78
End: 2018-02-08

## 2018-02-08 ENCOUNTER — SURGERY (OUTPATIENT)
Age: 78
End: 2018-02-08

## 2018-02-08 ENCOUNTER — RADIANT APPOINTMENT (OUTPATIENT)
Dept: RADIOLOGY | Facility: AMBULATORY SURGERY CENTER | Age: 78
End: 2018-02-08
Attending: INTERNAL MEDICINE
Payer: MEDICARE

## 2018-02-08 ENCOUNTER — HOME INFUSION (PRE-WILLOW HOME INFUSION) (OUTPATIENT)
Dept: PHARMACY | Facility: CLINIC | Age: 78
End: 2018-02-08

## 2018-02-08 VITALS
TEMPERATURE: 97.7 F | OXYGEN SATURATION: 99 % | HEIGHT: 64 IN | HEART RATE: 71 BPM | WEIGHT: 106.3 LBS | BODY MASS INDEX: 18.15 KG/M2 | RESPIRATION RATE: 16 BRPM | SYSTOLIC BLOOD PRESSURE: 123 MMHG | DIASTOLIC BLOOD PRESSURE: 83 MMHG

## 2018-02-08 DIAGNOSIS — C21.1 MALIGNANT NEOPLASM OF ANAL CANAL (H): ICD-10-CM

## 2018-02-08 DEVICE — CATH PORT POWERPORT CLEARVUE SLIM 6FR 5616000: Type: IMPLANTABLE DEVICE | Site: CHEST | Status: FUNCTIONAL

## 2018-02-08 RX ORDER — SODIUM CHLORIDE, SODIUM LACTATE, POTASSIUM CHLORIDE, CALCIUM CHLORIDE 600; 310; 30; 20 MG/100ML; MG/100ML; MG/100ML; MG/100ML
INJECTION, SOLUTION INTRAVENOUS CONTINUOUS
Status: DISCONTINUED | OUTPATIENT
Start: 2018-02-08 | End: 2018-02-09 | Stop reason: HOSPADM

## 2018-02-08 RX ORDER — SODIUM CHLORIDE 9 MG/ML
INJECTION, SOLUTION INTRAVENOUS CONTINUOUS
Status: DISCONTINUED | OUTPATIENT
Start: 2018-02-08 | End: 2018-02-09 | Stop reason: HOSPADM

## 2018-02-08 RX ORDER — LIDOCAINE 40 MG/G
CREAM TOPICAL
Status: DISCONTINUED | OUTPATIENT
Start: 2018-02-08 | End: 2018-02-09 | Stop reason: HOSPADM

## 2018-02-08 RX ORDER — OXYCODONE HYDROCHLORIDE 5 MG/1
5 TABLET ORAL EVERY 4 HOURS PRN
Status: DISCONTINUED | OUTPATIENT
Start: 2018-02-08 | End: 2018-02-09 | Stop reason: HOSPADM

## 2018-02-08 RX ORDER — ACETAMINOPHEN 325 MG/1
975 TABLET ORAL ONCE
Status: COMPLETED | OUTPATIENT
Start: 2018-02-08 | End: 2018-02-08

## 2018-02-08 RX ORDER — ONDANSETRON 2 MG/ML
INJECTION INTRAMUSCULAR; INTRAVENOUS PRN
Status: DISCONTINUED | OUTPATIENT
Start: 2018-02-08 | End: 2018-02-08

## 2018-02-08 RX ORDER — CLINDAMYCIN PHOSPHATE 900 MG/50ML
900 INJECTION, SOLUTION INTRAVENOUS
Status: COMPLETED | OUTPATIENT
Start: 2018-02-08 | End: 2018-02-08

## 2018-02-08 RX ORDER — HEPARIN SODIUM (PORCINE) LOCK FLUSH IV SOLN 100 UNIT/ML 100 UNIT/ML
5 SOLUTION INTRAVENOUS
Status: DISCONTINUED | OUTPATIENT
Start: 2018-02-08 | End: 2018-02-09 | Stop reason: HOSPADM

## 2018-02-08 RX ORDER — DEXAMETHASONE SODIUM PHOSPHATE 4 MG/ML
INJECTION, SOLUTION INTRA-ARTICULAR; INTRALESIONAL; INTRAMUSCULAR; INTRAVENOUS; SOFT TISSUE PRN
Status: DISCONTINUED | OUTPATIENT
Start: 2018-02-08 | End: 2018-02-08

## 2018-02-08 RX ORDER — MEPERIDINE HYDROCHLORIDE 25 MG/ML
12.5 INJECTION INTRAMUSCULAR; INTRAVENOUS; SUBCUTANEOUS
Status: DISCONTINUED | OUTPATIENT
Start: 2018-02-08 | End: 2018-02-09 | Stop reason: HOSPADM

## 2018-02-08 RX ORDER — NALOXONE HYDROCHLORIDE 0.4 MG/ML
.1-.4 INJECTION, SOLUTION INTRAMUSCULAR; INTRAVENOUS; SUBCUTANEOUS
Status: DISCONTINUED | OUTPATIENT
Start: 2018-02-08 | End: 2018-02-09 | Stop reason: HOSPADM

## 2018-02-08 RX ORDER — HEPARIN SODIUM,PORCINE 10 UNIT/ML
5 VIAL (ML) INTRAVENOUS EVERY 24 HOURS
Status: DISCONTINUED | OUTPATIENT
Start: 2018-02-08 | End: 2018-02-09 | Stop reason: HOSPADM

## 2018-02-08 RX ORDER — ONDANSETRON 2 MG/ML
4 INJECTION INTRAMUSCULAR; INTRAVENOUS EVERY 30 MIN PRN
Status: DISCONTINUED | OUTPATIENT
Start: 2018-02-08 | End: 2018-02-09 | Stop reason: HOSPADM

## 2018-02-08 RX ORDER — PROPOFOL 10 MG/ML
INJECTION, EMULSION INTRAVENOUS CONTINUOUS PRN
Status: DISCONTINUED | OUTPATIENT
Start: 2018-02-08 | End: 2018-02-08

## 2018-02-08 RX ORDER — ONDANSETRON 4 MG/1
4 TABLET, ORALLY DISINTEGRATING ORAL EVERY 30 MIN PRN
Status: DISCONTINUED | OUTPATIENT
Start: 2018-02-08 | End: 2018-02-09 | Stop reason: HOSPADM

## 2018-02-08 RX ORDER — LIDOCAINE HYDROCHLORIDE 20 MG/ML
INJECTION, SOLUTION INFILTRATION; PERINEURAL PRN
Status: DISCONTINUED | OUTPATIENT
Start: 2018-02-08 | End: 2018-02-08

## 2018-02-08 RX ORDER — FENTANYL CITRATE 50 UG/ML
25-50 INJECTION, SOLUTION INTRAMUSCULAR; INTRAVENOUS
Status: DISCONTINUED | OUTPATIENT
Start: 2018-02-08 | End: 2018-02-09 | Stop reason: HOSPADM

## 2018-02-08 RX ORDER — PROPOFOL 10 MG/ML
INJECTION, EMULSION INTRAVENOUS PRN
Status: DISCONTINUED | OUTPATIENT
Start: 2018-02-08 | End: 2018-02-08

## 2018-02-08 RX ADMIN — SODIUM CHLORIDE, SODIUM LACTATE, POTASSIUM CHLORIDE, CALCIUM CHLORIDE: 600; 310; 30; 20 INJECTION, SOLUTION INTRAVENOUS at 09:14

## 2018-02-08 RX ADMIN — PROPOFOL 50 MCG/KG/MIN: 10 INJECTION, EMULSION INTRAVENOUS at 10:14

## 2018-02-08 RX ADMIN — CLINDAMYCIN PHOSPHATE 900 MG: 900 INJECTION, SOLUTION INTRAVENOUS at 09:31

## 2018-02-08 RX ADMIN — PROPOFOL 20 MG: 10 INJECTION, EMULSION INTRAVENOUS at 10:14

## 2018-02-08 RX ADMIN — Medication 18 ML: at 10:48

## 2018-02-08 RX ADMIN — LIDOCAINE HYDROCHLORIDE 60 MG: 20 INJECTION, SOLUTION INFILTRATION; PERINEURAL at 10:14

## 2018-02-08 RX ADMIN — ONDANSETRON 4 MG: 2 INJECTION INTRAMUSCULAR; INTRAVENOUS at 10:46

## 2018-02-08 RX ADMIN — ACETAMINOPHEN 975 MG: 325 TABLET ORAL at 09:14

## 2018-02-08 RX ADMIN — PROPOFOL 20 MG: 10 INJECTION, EMULSION INTRAVENOUS at 10:32

## 2018-02-08 RX ADMIN — SODIUM CHLORIDE: 9 INJECTION, SOLUTION INTRAVENOUS at 10:08

## 2018-02-08 RX ADMIN — DEXAMETHASONE SODIUM PHOSPHATE 4 MG: 4 INJECTION, SOLUTION INTRA-ARTICULAR; INTRALESIONAL; INTRAMUSCULAR; INTRAVENOUS; SOFT TISSUE at 10:14

## 2018-02-08 NOTE — ANESTHESIA CARE TRANSFER NOTE
Patient: Elizabeth Taylor    Procedure(s):  Place Single Lumen Venous Chest Port Placement - Wound Class: I-Clean    Diagnosis: Malignant Neoplasm of Anal Canal  Diagnosis Additional Information: No value filed.    Anesthesia Type:   MAC     Note:  Airway :Room Air  Patient transferred to:Phase II  Comments:   Transferred to:Phase II      Patient vital signs: stable    Airway: none    Monitors and alarms on; PIV intact and patent; in recliner; awake; report given to RN.     116/83, 71,20,97%,97.8      Ruby Pradhan CRNA, APRN    2/8/2018 11:05 AM Handoff Report: Identifed the Patient, Identified the Reponsible Provider, Reviewed the pertinent medical history, Discussed the surgical course, Reviewed Intra-OP anesthesia mangement and issues during anesthesia, Set expectations for post-procedure period and Allowed opportunity for questions and acknowledgement of understanding      Vitals: (Last set prior to Anesthesia Care Transfer)    CRNA VITALS  2/8/2018 1029 - 2/8/2018 1104      2/8/2018             Pulse: 70    SpO2: 97 %    Resp Rate (set): 10                Electronically Signed By: GRAHAM Martines CRNA  February 8, 2018  11:04 AM

## 2018-02-08 NOTE — IP AVS SNAPSHOT
[facility]    [address] [place] [address]    Phone:  [phone]    Fax:  [phone]                                       After Visit Summary   2/8/2018    Elizabeth Taylor    MRN: [id number]           After Visit Summary Signature Page     I have received my discharge instructions, and my questions have been answered. I have discussed any challenges I see with this plan with the nurse or doctor.    ..........................................................................................................................................  Patient/Patient Representative Signature      ..........................................................................................................................................  Patient Representative Print Name and Relationship to Patient    ..................................................               ................................................  Date                                            Time    ..........................................................................................................................................  Reviewed by Signature/Title    ...................................................              ..............................................  Date                                                            Time

## 2018-02-08 NOTE — DISCHARGE INSTRUCTIONS
A collaboration between [facility] and [facility]  Experts in minimally invasive, targeted treatments performed using imaging guidance    Venous Access Device,  Port Catheter or Tunneled Central Line Placement    Today you had a procedure today to install a venous access device; either a tunneled central vein catheter or a subcutaneous port catheter.    One of our Radiology PAs performed this procedure for you today:  ? Tom De La aPz PA-C  ? ELMER Murray PA-C  ? Mayo Ya PA-C  ? Pinky Real PA-C   ? Bowen Mcfadden PA-C    After you go home:  - Drink plenty of fluids.  Generally 6-8 (8 ounce) glasses a day is recommended.  - Resume your regular diet unless otherwise ordered by a medical provider.  - Keep any applied tape/gauze dressings clean and dry.  Change tape/gauze dressings if they get wet or soiled.  - You may shower the following day after procedure, however cover and protect from moisture any tape/gauze dressings.  You may let water hit and run over dried skin glue, but do not scrub.  Pat the area dry after showering.  - Port placement incisions are closed with absorbable suture, meaning they do not need to be removed at a later date, and a topical skin adhesive (skin glue).  This glue will wear off in 7-14 days.  Do not remove before this time.  If 14 days have passed and residual glue is present, you may gently remove it.  - Do not apply gels, lotions, or ointments to the glue site for the first 10 days as this may cause the glue to prematurely soften and fail.  - Do not perform strenuous activities or lift greater than 10 pounds for the next three days.  - If there is bleeding or oozing from the procedure site, apply firm pressure to the area for 5-10 minutes.  If the bleeding continues seek medical advice at the numbers below.  - Mild procedure site discomfort can be treated with an ice pack and over-the-counter pain  relievers.        For 24 hours after any sedation used:  - Relax and take it easy.  No strenuous activities.  - Do not drive or operate machines at home or at work.  - No alcohol consumption.  - Do not make any important or legal decisions.    Call our Interventional Radiology (IR) service if:  - If you start bleeding from the procedure site.  If you do start to bleed from the site, lie down and hold some pressure on the site.  Our radiology provider can help you decide if you need to return to the hospital.  - If you have new or worsening pain related to the procedure.  - If you have concerning swelling at the procedure site.  - If you develop persistent nausea or vomiting.  - If you develop hives or a rash or any unexplained itching.  - If you have a fever of greater than 100.5  F and chills in the first 5 days after procedure.  - Any other concerns related to your procedure.      [facility]  Interventional Radiology (IR)  [address] [place] [address]    Contact Number:  [phone]  (IR control desk)  - Monday - Friday 8:00 am - 4:30 pm    After hours for urgent concerns:  [phone]  - After 4:30 pm Monday - Friday, Weekends and Holidays.   - Ask for Interventional Radiology on-call.  Someone is available 24 hours a day.  - [facility] toll free number:  [phone]

## 2018-02-08 NOTE — PROCEDURES
Interventional Radiology Brief Post Procedure Note    Procedure:  IR CHEST PORT PLACEMENT > 5 YRS OF AGE [[id number]]    Proceduralist: ELMER Murray PA-C    Assistant: None    Time Out: Prior to the start of the procedure and with procedural staff participation, I verbally confirmed the patient s identity using two indicators, relevant allergies, that the procedure was appropriate and matched the consent or emergent situation, and that the correct equipment/implants were available. Immediately prior to starting the procedure I conducted the Time Out with the procedural staff and re-confirmed the patient s name, procedure, and site/side. (The Joint Commission universal protocol was followed.)  Yes        Sedation: Monitored Anesthesia Care (MAC) administered and documented by Anesthesia Care Provider    Findings: Completed placement of a 6 Occitan, 24 cm, Bard ClearVUE Slim brand, single lumen venous chest  power-injectable port via RIJV.  Tip lying in the right atrium. PORT is ready for immediate use.  Dx:  Malignant neoplasm of anal canal.  Lillie.  MAC  0.2    Estimated Blood Loss: Less than 10 ml    Fluoroscopy Time:  minute(s)    SPECIMENS: None    Complications: 1. None     Condition: Stable    Plan:     Comments: See dictated procedure note for full details.    Zaire Moreira PA-C

## 2018-02-08 NOTE — PROGRESS NOTES
Therapy: 5FU  Insurance: Medicare only    Co-Insurance: 80%     Pt must be home bound for nursing or would cost $344 per visit    In reference to referral made on 2/8/18 to check 5fu coverage.     Please contact Intake with any questions, [phone] or In Basket pool, [facility] ([phone]).

## 2018-02-08 NOTE — ANESTHESIA POSTPROCEDURE EVALUATION
Patient: Elizabeth Taylor    Procedure(s):  Place Single Lumen Venous Chest Port Placement - Wound Class: I-Clean    Diagnosis:Malignant Neoplasm of Anal Canal  Diagnosis Additional Information: No value filed.    Anesthesia Type:  MAC    Note:  Anesthesia Post Evaluation    Patient location during evaluation: PACU  Patient participation: Able to fully participate in evaluation  Level of consciousness: awake  Pain management: adequate  Airway patency: patent  Cardiovascular status: acceptable  Respiratory status: acceptable  Hydration status: balanced  PONV: none     Anesthetic complications: None          Last vitals:  Vitals:    02/08/18 0824 02/08/18 1105 02/08/18 1120   BP: 134/77 116/83 123/83   Pulse: 71     Resp: 15 16 16   Temp: 36.5  C (97.7  F) 36.6  C (97.8  F) 36.5  C (97.7  F)   SpO2: 97% 97% 99%         Electronically Signed By: Edison Ibanez MD  February 8, 2018  2:03 PM

## 2018-02-08 NOTE — ANESTHESIA PREPROCEDURE EVALUATION
Anesthesia Evaluation     .             ROS/MED HX    ENT/Pulmonary: Comment: Dry eye syndrome - neg pulmonary ROS     Neurologic:  - neg neurologic ROS     Cardiovascular:  - neg cardiovascular ROS       METS/Exercise Tolerance:     Hematologic:  - neg hematologic  ROS       Musculoskeletal:  - neg musculoskeletal ROS       GI/Hepatic:  - neg GI/hepatic ROS       Renal/Genitourinary:  - ROS Renal section negative       Endo:  - neg endo ROS       Psychiatric:  - neg psychiatric ROS       Infectious Disease:  - neg infectious disease ROS       Malignancy:   (+) Malignancy History of Other  Other CA rectal ca Active status post      - no malignancy   Other:    - neg other ROS                 Physical Exam  Normal systems: cardiovascular, pulmonary and dental    Airway   Mallampati: II  TM distance: >3 FB  Neck ROM: full    Dental     Cardiovascular       Pulmonary                     Anesthesia Plan      History & Physical Review  History and physical reviewed and following examination; no interval change.    ASA Status:  2 .    NPO Status:  > 8 hours    Plan for MAC with Intravenous induction. Maintenance will be TIVA.  Reason for MAC:  Deep or markedly invasive procedure (G8)  PONV prophylaxis:  Ondansetron (or other 5HT-3)       Postoperative Care  Postoperative pain management:  IV analgesics, Oral pain medications and Multi-modal analgesia.      Consents  Anesthetic plan, risks, benefits and alternatives discussed with:  Patient..                          .

## 2018-02-08 NOTE — IP AVS SNAPSHOT
MRN:[id number]                      After Visit Summary   2/8/2018    Elizabeth Taylor    MRN: [id number]           Thank you!     Thank you for choosing [facility] for your care. Our goal is always to provide you with excellent care. Hearing back from our patients is one way we can continue to improve our services. Please take a few minutes to complete the written survey that you may receive in the mail after you visit with us. Thank you!        Patient Information     Date Of Birth          1940        About your hospital stay     You were admitted on:  February 8, 2018 You last received care in the:  [facility]    You were discharged on:  February 8, 2018       Who to Call     For medical emergencies, please call 911.  For non-urgent questions about your medical care, please call your primary care provider or clinic, [phone]  For questions related to your surgery, please call your surgery clinic        Attending Provider     Provider Specialty    Zaire Moreira PA-C Radiology       Primary Care Provider Office Phone # Fax #    Baron Seth [phone]      Your next 10 appointments already scheduled     Feb 12, 2018  9:00 AM CST   EXTERNAL RADIATION TREATMENT with [facility] RAD ONC SANIYA   Radiation Oncology Clinic ([facility])    [facility]  1st Floor  [address] [address]   [phone]            Feb 12, 2018  9:15 AM CST   ON TREATMENT VISIT with Zaire Madison MD   Radiation Oncology Clinic ([facility])    [facility]  1st Floor  [address] [address]   [phone]            Feb 12, 2018  1:00 PM CST   [facility] Draw with  [facility] DRAW   [facility] Draw ([facility])    [address]  [address]  [facility] [address] [phone]            Feb 12,  2018  1:30 PM CST   Infusion 120 with [facility] ONCOLOGY INFUSION, UC 22 ATC   [facility] ([facility])    [address]  [address]  [facility] [address]   [phone]            Feb 13, 2018 10:30 AM CST   EXTERNAL RADIATION TREATMENT with [facility] RAD ONC SANIYA   Radiation Oncology Clinic ([facility])    [facility]  1st Floor  [address]  [facility] [address]   [phone]            Feb 14, 2018 10:30 AM CST   EXTERNAL RADIATION TREATMENT with [facility] RAD ONC SANIYA   Radiation Oncology Clinic ([facility])    [facility]  1st Floor  [address]  [facility] [address]   [phone]            Feb 15, 2018 10:30 AM CST   EXTERNAL RADIATION TREATMENT with [facility] RAD ONC SANIYA   Radiation Oncology Clinic ([facility])    [facility]  1st Floor  [address]  [facility] [address]   [phone]            Feb 16, 2018  9:00 AM CST   (Arrive by 8:45 AM)   Return Visit with Shen Ray MD   [facility] ([facility])    [address]  [address]  [facility] [address]   [phone]            Feb 16, 2018 10:30 AM CST   EXTERNAL RADIATION TREATMENT with P RAD ONC SANIYA   Radiation Oncology Clinic ([facility])    [facility]  1st Floor  [address]  [facility] [address]   [phone]              Further instructions from your care team         A collaboration between [facility] and [facility]  Experts in minimally invasive, targeted treatments performed using imaging guidance    Venous Access Device,  Port Catheter or Tunneled Central Line Placement    Today you had a procedure today to install a venous access device; either a tunneled central vein catheter or a subcutaneous port catheter.    One of our  Radiology PAs performed this procedure for you today:  ? Tom De La Paz PA-C  ? ELMER Murray PA-C  ? Mayo Ya PA-C  ? Pinky Real PA-C   ? Bowen Mcfadden PA-C    After you go home:  - Drink plenty of fluids.  Generally 6-8 (8 ounce) glasses a day is recommended.  - Resume your regular diet unless otherwise ordered by a medical provider.  - Keep any applied tape/gauze dressings clean and dry.  Change tape/gauze dressings if they get wet or soiled.  - You may shower the following day after procedure, however cover and protect from moisture any tape/gauze dressings.  You may let water hit and run over dried skin glue, but do not scrub.  Pat the area dry after showering.  - Port placement incisions are closed with absorbable suture, meaning they do not need to be removed at a later date, and a topical skin adhesive (skin glue).  This glue will wear off in 7-14 days.  Do not remove before this time.  If 14 days have passed and residual glue is present, you may gently remove it.  - Do not apply gels, lotions, or ointments to the glue site for the first 10 days as this may cause the glue to prematurely soften and fail.  - Do not perform strenuous activities or lift greater than 10 pounds for the next three days.  - If there is bleeding or oozing from the procedure site, apply firm pressure to the area for 5-10 minutes.  If the bleeding continues seek medical advice at the numbers below.  - Mild procedure site discomfort can be treated with an ice pack and over-the-counter pain relievers.        For 24 hours after any sedation used:  - Relax and take it easy.  No strenuous activities.  - Do not drive or operate machines at home or at work.  - No alcohol consumption.  - Do not make any important or legal decisions.    Call our Interventional Radiology (IR) service if:  - If you start bleeding from the procedure site.  If you do start to bleed from the site, lie down and hold some pressure on the site.  Our  "radiology provider can help you decide if you need to return to the hospital.  - If you have new or worsening pain related to the procedure.  - If you have concerning swelling at the procedure site.  - If you develop persistent nausea or vomiting.  - If you develop hives or a rash or any unexplained itching.  - If you have a fever of greater than 100.5  F and chills in the first 5 days after procedure.  - Any other concerns related to your procedure.      [facility]  Interventional Radiology (IR)  [address]  2nd Floor, [facility] [place] [address]    Contact Number:  [phone]  (IR control desk)  - Monday - Friday 8:00 am - 4:30 pm    After hours for urgent concerns:  [phone]  - After 4:30 pm Monday - Friday, Weekends and Holidays.   - Ask for Interventional Radiology on-call.  Someone is available 24 hours a day.  - [facility] toll free number:  [phone]        Pending Results     Date and Time Order Name Status Description    2/8/2018 0900 IR CHEST PORT PLACEMENT > 5 YRS OF AGE In process             Admission Information     Date & Time Provider Department Dept. Phone    2/8/2018 Zaire Moreira PA-C [facility] [phone]      Your Vitals Were     Blood Pressure Pulse Temperature Respirations Height Weight    134/77 71 97.7  F (36.5  C) (Oral) 15 1.619 m (5' 3.75\") 48.2 kg (106 lb 4.8 oz)    Pulse Oximetry BMI (Body Mass Index)                97% 18.39 kg/m2          "[employer]" Information     "[employer]" is an electronic gateway that provides easy, online access to your medical records. With "[employer]", you can request a clinic appointment, read your test results, renew a prescription or communicate with your care team.     To sign up for "[employer]" visit the website at [email or web address]   You will be asked to enter the access code listed below, as well as some personal information. Please follow the directions to " create your username and password.     Your access code is: [email or web address]  Expires: 3/6/2018 12:13 PM     Your access code will  in 90 days. If you need help or a new code, please contact your [facility] or call [phone] for assistance.        Care EveryWhere ID     This is your Care EveryWhere ID. This could be used by other organizations to access your [place] medical records  [id number]        Equal Access to Services     IONA DALEY : Hadii chelo ku hadasho Soomaali, waaxda luqadaha, qaybta kaalmada adeegyada, waxay toreyin mylene catalan. So [facility] [phone].    ATENCIÓN: Si habla español, tiene a ghosh disposición servicios gratuitos de asistencia lingüística. [facility] [phone].    We comply with applicable federal civil rights laws and [place] laws. We do not discriminate on the basis of race, color, national origin, age, disability, sex, sexual orientation, or gender identity.               Review of your medicines      CONTINUE these medicines which have NOT CHANGED        Dose / Directions    CALCIUM 500/VITAMIN D PO        Refills:  0       hydrocortisone 2.5 % cream   Commonly known as:  ANUSOL-HC   Used for:  External hemorrhoids        Place rectally 2 times daily   Quantity:  30 g   Refills:  1       pramipexole 0.125 MG tablet   Commonly known as:  MIRAPEX   Used for:  Restless leg syndrome        Take one tablet 2-3 hours before bedtime, may increase to 2 tablets after one week if needed   Quantity:  60 tablet   Refills:  5       TYLENOL EXTRA STRENGTH PO        1 TABLET EVERY 4 HOURS AS NEEDED   Refills:  0       vitamin B complex with vitamin C Tabs tablet        Dose:  1 tablet   Take 1 tablet by mouth daily   Refills:  0                Protect others around you: Learn how to safely use, store and throw away your medicines at www.disposemymeds.org.             Medication List: This is a list of all your medications and when to take them.  Check marks below indicate your daily home schedule. Keep this list as a reference.      Medications           Morning Afternoon Evening Bedtime As Needed    CALCIUM 500/VITAMIN D PO                                hydrocortisone 2.5 % cream   Commonly known as:  ANUSOL-HC   Place rectally 2 times daily                                pramipexole 0.125 MG tablet   Commonly known as:  MIRAPEX   Take one tablet 2-3 hours before bedtime, may increase to 2 tablets after one week if needed                                TYLENOL EXTRA STRENGTH PO   1 TABLET EVERY 4 HOURS AS NEEDED                                vitamin B complex with vitamin C Tabs tablet   Take 1 tablet by mouth daily

## 2018-02-08 NOTE — PATIENT INSTRUCTIONS
February 2018 Sunday Monday Tuesday Wednesday Thursday Friday Saturday                       1     2     [facility] NEW    8:15 AM   (60 min.)   Shen Ray MD   [facility]     [facility] CONSULT   10:00 AM   (90 min.)   Zaire Madison MD   Radiation Oncology Clinic     P [facility] LAB DRAW    1:00 PM   (15 min.)   [facility] DRAW   [facility] Draw     P TCT/SIM SUITE    2:00 PM   (60 min.)   Zaire Madison MD   Radiation Oncology Clinic 3       4     5     6     7     8     Outpatient Visit    8:06 AM   [facility]     IR CHEST PORT PLACEMENT >5 YRS    8:15 AM   (75 min.)   [id number]   [facility]     INSERT PORT VASCULAR ACCESS    9:45 AM   Zaire Moreira PA-C   [facility] OR 9     10       11     12     [facility] EXTERNAL RADIATION TREATMT   11:00 AM   (30 min.)   P RAD ONC SANIYA   Radiation Oncology Clinic     [facility] ON TREATMENT VISIT   11:30 AM   (15 min.)   Zaire Madison MD   Radiation Oncology Clinic     P [facility] DRAW    1:00 PM   (15 min.)   [facility]     P [facility]C INFUSION 120    1:30 PM   (120 min.)   [facility] ONCOLOGY INFUSION   [facility] 13     [facility] EXTERNAL RADIATION TREATMT   10:30 AM   (15 min.)   P RAD ONC SANIYA   Radiation Oncology Clinic 14     [facility] EXTERNAL RADIATION TREATMT   10:30 AM   (15 min.)   P RAD ONC SANIYA   Radiation Oncology Clinic 15     [facility] EXTERNAL RADIATION TREATMT   10:30 AM   (15 min.)   P RAD ONC SANIYA   Radiation Oncology Clinic 16     [facility] RETURN    9:15 AM   (30 min.)   Shen Ray MD   [facility]     [facility] EXTERNAL RADIATION TREATMT   10:30 AM   (15 min.)   P RAD ONC SANIYA   Radiation Oncology Clinic 17       18     19     [facility] EXTERNAL RADIATION TREATMT    8:30 AM   (15 min.)   [facility] RAD ONC SANIYA   Radiation Oncology Clinic     [facility] ON TREATMENT VISIT    8:45 AM   (15 min.)   Zaire Madison  MD Evangel[employer]   Radiation Oncology Clinic 20     [facility] EXTERNAL RADIATION TREATMT    8:30 AM   (15 min.)   [facility] RAD ONC SANIYA   [facility] 21     [facility] EXTERNAL RADIATION TREATMT   10:30 AM   (15 min.)   [facility] RAD ONC SANIYA   [facility] Clinic 22     [facility] EXTERNAL RADIATION TREATMT    8:30 AM   (15 min.)   [facility] RAD ONC SANIYA   [facility] 23     [facility] EXTERNAL RADIATION TREATMT    8:30 AM   (15 min.)   [facility] RAD ONC SANIYA   [facility] 24       25     26     [facility] EXTERNAL RADIATION TREATMT    9:00 AM   (15 min.)   [facility] RAD ONC SANIYA   Radiation Oncology Clinic     [facility] ON TREATMENT VISIT    9:15 AM   (15 min.)   Zaire Madison MD   [facility]cology [facility] 27     [facility] EXTERNAL RADIATION TREATMT    9:00 AM   (15 min.)   [facility] RAD ONC SANIYA   [facility] 28     [facility] EXTERNAL RADIATION TREATMT    9:00 AM   (15 min.)   [facility] RAD ONC SANIYA   Radiation Oncology Clinic                           March 2018 Sunday Monday Tuesday Wednesday Thursday Friday Saturday                       1     [facility] EXTERNAL RADIATION TREATMT    9:00 AM   (15 min.)   [facility] RAD ONC SANIYA   Radiation Oncology Clinic 2     [facility] EXTERNAL RADIATION TREATMT    9:00 AM   (15 min.)   [facility] RAD ONC SANIYA   [facility] Oncology Clinic 3       4     5     [facility] EXTERNAL RADIATION TREATMT    9:00 AM   (15 min.)   [facility] RAD ONC SANIYA   Radiation Oncology Clinic     [facility] ON TREATMENT VISIT    9:15 AM   (15 min.)   Zaire Madison MD   Radiation Oncology Clinic 6     [facility] EXTERNAL RADIATION TREATMT    9:00 AM   (15 min.)   [facility] RAD ONC SANIYA   Radiation Oncology Clinic 7     [facility] EXTERNAL RADIATION TREATMT    9:00 AM   (15 min.)   [facility] RAD ONC SANIYA   Radiation Oncology Clinic 8     [facility] EXTERNAL RADIATION TREATMT    9:00 AM   (15 min.)   [facility] RAD ONC SANIYA   [facility] Oncology Clinic 9     [facility] EXTERNAL RADIATION TREATMT    9:00 AM   (15 min.)   [facility] RAD ONC SANIYA   [facility]iation Oncology Clinic 10       11     12     [facility] EXTERNAL  RADIATION TREATMT    9:00 AM   (15 min.)   [facility] RAD ONC SANIYA   Radiation Oncology Clinic     [facility] ON TREATMENT VISIT    9:15 AM   (15 min.)   Zaire Madison MD   Radiation Oncology Clinic     P [facility] DRAW   11:30 AM   (15 min.)    [facility] LAB DRAW   [facility] Lab Draw     P ONC INFUSION 120   12:00 PM   (120 min.)   [facility] ONCOLOGY INFUSION   [facility] 13     [facility] EXTERNAL RADIATION TREATMT    9:00 AM   (15 min.)   [facility] RAD ONC SANIYA   Radiation Oncology Clinic 14     [facility] EXTERNAL RADIATION TREATMT    9:00 AM   (15 min.)   [facility] RAD ONC SANIYA   Radiation Oncology Clinic 15     [facility] EXTERNAL RADIATION TREATMT    9:00 AM   (15 min.)   [facility] RAD ONC SANIYA   Radiation Oncology Clinic 16     [facility] EXTERNAL RADIATION TREATMT    9:00 AM   (15 min.)   P RAD ONC SANIYA   Radiation Oncology Clinic 17       18     19     [facility] EXTERNAL RADIATION TREATMT    9:00 AM   (15 min.)   [facility] RAD ONC SANIYA   Radiation Oncology Clinic     [facility] ON TREATMENT VISIT    9:15 AM   (15 min.)   Zaire Madison MD   Radiation Oncology Clinic 20     [facility] EXTERNAL RADIATION TREATMT    9:00 AM   (15 min.)   [facility] RAD ONC SANIYA   Radiation Oncology Clinic 21     [facility] EXTERNAL RADIATION TREATMT    9:00 AM   (15 min.)   [facility] RAD ONC SANIYA   Radiation Oncology Clinic 22     [facility] EXTERNAL RADIATION TREATMT    9:00 AM   (15 min.)   [facility] RAD ONC SANIYA   Radiation Oncology Clinic 23     [facility] EXTERNAL RADIATION TREATMT    9:00 AM   (15 min.)   P RAD ONC SANIYA   Radiation Oncology Clinic 24       25     26     27     28     29     30     31 April 2018 Sunday Monday Tuesday Wednesday Thursday Friday Saturday   1     2     3     4     5     6     7       8     9     10     11     12     13     14       15     16     17     18     19     20     21       22     23     24     25     26     27     28       29     30

## 2018-02-09 RX ORDER — PROCHLORPERAZINE MALEATE 10 MG
10 TABLET ORAL EVERY 6 HOURS PRN
Qty: 30 TABLET | Refills: 1 | Status: ON HOLD | OUTPATIENT
Start: 2018-02-09 | End: 2018-04-04

## 2018-02-09 RX ORDER — LORAZEPAM 0.5 MG/1
0.5 TABLET ORAL EVERY 4 HOURS PRN
Qty: 30 TABLET | Refills: 1 | Status: SHIPPED | OUTPATIENT
Start: 2018-02-09 | End: 2018-02-12

## 2018-02-12 ENCOUNTER — INFUSION THERAPY VISIT (OUTPATIENT)
Dept: ONCOLOGY | Facility: CLINIC | Age: 78
End: 2018-02-12
Attending: INTERNAL MEDICINE
Payer: MEDICARE

## 2018-02-12 ENCOUNTER — CARE COORDINATION (OUTPATIENT)
Dept: ONCOLOGY | Facility: CLINIC | Age: 78
End: 2018-02-12

## 2018-02-12 ENCOUNTER — OFFICE VISIT (OUTPATIENT)
Dept: RADIATION ONCOLOGY | Facility: CLINIC | Age: 78
End: 2018-02-12
Attending: RADIOLOGY
Payer: MEDICARE

## 2018-02-12 ENCOUNTER — APPOINTMENT (OUTPATIENT)
Dept: LAB | Facility: CLINIC | Age: 78
End: 2018-02-12
Attending: INTERNAL MEDICINE
Payer: MEDICARE

## 2018-02-12 VITALS
OXYGEN SATURATION: 96 % | BODY MASS INDEX: 19.1 KG/M2 | SYSTOLIC BLOOD PRESSURE: 146 MMHG | HEART RATE: 91 BPM | DIASTOLIC BLOOD PRESSURE: 79 MMHG | RESPIRATION RATE: 16 BRPM | TEMPERATURE: 98.9 F | WEIGHT: 110.4 LBS

## 2018-02-12 VITALS — WEIGHT: 106 LBS | BODY MASS INDEX: 18.34 KG/M2

## 2018-02-12 DIAGNOSIS — C21.1 MALIGNANT NEOPLASM OF ANAL CANAL (H): ICD-10-CM

## 2018-02-12 DIAGNOSIS — C21.1 MALIGNANT NEOPLASM OF ANAL CANAL (H): Primary | ICD-10-CM

## 2018-02-12 LAB
ALBUMIN SERPL-MCNC: 3.6 G/DL (ref 3.4–5)
ALP SERPL-CCNC: 98 U/L (ref 40–150)
ALT SERPL W P-5'-P-CCNC: 42 U/L (ref 0–50)
ANION GAP SERPL CALCULATED.3IONS-SCNC: 5 MMOL/L (ref 3–14)
AST SERPL W P-5'-P-CCNC: 33 U/L (ref 0–45)
BASOPHILS # BLD AUTO: 0 10E9/L (ref 0–0.2)
BASOPHILS NFR BLD AUTO: 0.4 %
BILIRUB SERPL-MCNC: 1.4 MG/DL (ref 0.2–1.3)
BUN SERPL-MCNC: 17 MG/DL (ref 7–30)
CALCIUM SERPL-MCNC: 8.7 MG/DL (ref 8.5–10.1)
CHLORIDE SERPL-SCNC: 104 MMOL/L (ref 94–109)
CO2 SERPL-SCNC: 29 MMOL/L (ref 20–32)
CREAT SERPL-MCNC: 0.6 MG/DL (ref 0.52–1.04)
DIFFERENTIAL METHOD BLD: NORMAL
EOSINOPHIL # BLD AUTO: 0 10E9/L (ref 0–0.7)
EOSINOPHIL NFR BLD AUTO: 0.6 %
ERYTHROCYTE [DISTWIDTH] IN BLOOD BY AUTOMATED COUNT: 13 % (ref 10–15)
GFR SERPL CREATININE-BSD FRML MDRD: >90 ML/MIN/1.7M2
GLUCOSE SERPL-MCNC: 125 MG/DL (ref 70–99)
HCT VFR BLD AUTO: 41 % (ref 35–47)
HGB BLD-MCNC: 13.5 G/DL (ref 11.7–15.7)
IMM GRANULOCYTES # BLD: 0 10E9/L (ref 0–0.4)
IMM GRANULOCYTES NFR BLD: 0.3 %
LYMPHOCYTES # BLD AUTO: 0.8 10E9/L (ref 0.8–5.3)
LYMPHOCYTES NFR BLD AUTO: 11.2 %
MCH RBC QN AUTO: 31 PG (ref 26.5–33)
MCHC RBC AUTO-ENTMCNC: 32.9 G/DL (ref 31.5–36.5)
MCV RBC AUTO: 94 FL (ref 78–100)
MONOCYTES # BLD AUTO: 0.5 10E9/L (ref 0–1.3)
MONOCYTES NFR BLD AUTO: 7.6 %
NEUTROPHILS # BLD AUTO: 5.4 10E9/L (ref 1.6–8.3)
NEUTROPHILS NFR BLD AUTO: 79.9 %
NRBC # BLD AUTO: 0 10*3/UL
NRBC BLD AUTO-RTO: 0 /100
PLATELET # BLD AUTO: 184 10E9/L (ref 150–450)
POTASSIUM SERPL-SCNC: 3.8 MMOL/L (ref 3.4–5.3)
PROT SERPL-MCNC: 6.8 G/DL (ref 6.8–8.8)
RBC # BLD AUTO: 4.35 10E12/L (ref 3.8–5.2)
SODIUM SERPL-SCNC: 138 MMOL/L (ref 133–144)
WBC # BLD AUTO: 6.7 10E9/L (ref 4–11)

## 2018-02-12 PROCEDURE — 77331 SPECIAL RADIATION DOSIMETRY: CPT | Performed by: RADIOLOGY

## 2018-02-12 PROCEDURE — 96375 TX/PRO/DX INJ NEW DRUG ADDON: CPT

## 2018-02-12 PROCEDURE — 80053 COMPREHEN METABOLIC PANEL: CPT | Performed by: INTERNAL MEDICINE

## 2018-02-12 PROCEDURE — 77386 ZZH IMRT TREATMENT DELIVERY, COMPLEX: CPT | Performed by: RADIOLOGY

## 2018-02-12 PROCEDURE — 85025 COMPLETE CBC W/AUTO DIFF WBC: CPT | Performed by: INTERNAL MEDICINE

## 2018-02-12 PROCEDURE — 25000128 H RX IP 250 OP 636: Mod: ZF | Performed by: INTERNAL MEDICINE

## 2018-02-12 PROCEDURE — 96409 CHEMO IV PUSH SNGL DRUG: CPT

## 2018-02-12 PROCEDURE — 96416 CHEMO PROLONG INFUSE W/PUMP: CPT

## 2018-02-12 RX ORDER — MITOMYCIN 5 MG/10ML
10 INJECTION, POWDER, LYOPHILIZED, FOR SOLUTION INTRAVENOUS ONCE
Status: COMPLETED | OUTPATIENT
Start: 2018-02-12 | End: 2018-02-12

## 2018-02-12 RX ORDER — HEPARIN SODIUM (PORCINE) LOCK FLUSH IV SOLN 100 UNIT/ML 100 UNIT/ML
5 SOLUTION INTRAVENOUS EVERY 8 HOURS
Status: DISCONTINUED | OUTPATIENT
Start: 2018-02-12 | End: 2018-02-12 | Stop reason: HOSPADM

## 2018-02-12 RX ORDER — LORAZEPAM 0.5 MG/1
0.5 TABLET ORAL EVERY 4 HOURS PRN
Qty: 30 TABLET | Refills: 1 | Status: ON HOLD
Start: 2018-02-12 | End: 2018-04-04

## 2018-02-12 RX ADMIN — MITOMYCIN 15 MG: 5 INJECTION, POWDER, LYOPHILIZED, FOR SOLUTION INTRAVENOUS at 15:51

## 2018-02-12 RX ADMIN — DEXAMETHASONE SODIUM PHOSPHATE 12 MG: 10 INJECTION, SOLUTION INTRAMUSCULAR; INTRAVENOUS at 13:58

## 2018-02-12 RX ADMIN — SODIUM CHLORIDE 250 ML: 9 INJECTION, SOLUTION INTRAVENOUS at 13:58

## 2018-02-12 RX ADMIN — SODIUM CHLORIDE, PRESERVATIVE FREE 5 ML: 5 INJECTION INTRAVENOUS at 13:09

## 2018-02-12 ASSESSMENT — PAIN SCALES - GENERAL: PAINLEVEL: NO PAIN (0)

## 2018-02-12 NOTE — PROGRESS NOTES
Infusion Nursing Note:  Elizabeth Taylor presents today for Cycle 1 Day 1 Mytomycin, Fluorouracil pump.    Patient seen by provider today: No    Treatment Conditions:  Lab Results   Component Value Date    HGB 13.5 02/12/2018     Lab Results   Component Value Date    WBC 6.7 02/12/2018      Lab Results   Component Value Date    ANEU 5.4 02/12/2018     Lab Results   Component Value Date     02/12/2018      Results reviewed, labs MET treatment parameters, ok to proceed with treatment.    Intravenous Access:  Implanted Port.    Note: Elizabeth and friend Heather oriented to unit, facilities, snack area, bathrooms and pharmacy. Patient is pleasant today with no complaints and very eager to learn. Elizabeth has written information on her chemotherapies with her today and it was reviewed with her prior to starting chemotherapy. Patient was oriented on how/when to use her call light and patient verbalized understanding. Elizabeth will return to clinic for pump D/C in four day.  Pump connection initiated after port dressing confirmed dry/intact. Positive flush and blood return in port needle. Pump infusing at 2.5 ml/hr confirmed with Juan [facility]her [facility] at bedside. All connections taped and secure, double checked with Marge Caro RN.    Post Infusion Assessment:  Patient tolerated infusion without incident.  Blood return noted pre and post infusion.  Site patent and intact, free from redness, edema or discomfort.  No evidence of extravasations.  Access continued for home pump use.    Discharge Plan:   Prescription refills given for Compazine.  Discharge instructions reviewed with: Patient.  Patient and/or family verbalized understanding of discharge instructions and all questions answered.  Copy of AVS reviewed with patient and/or family.  Patient will return 2/16/18 at 1530 pm for Pump D/C for next appointment.  Patient discharged in stable condition accompanied by: friend.  Departure Mode: Ambulatory.    Marge Carrillo RN

## 2018-02-12 NOTE — PROGRESS NOTES
Per Marge KNOWLES, infusion nurse, pt never filled lorazepam prescription that she was given on 2/9/18. It was ordered 2/9/18 and she requested to fill it today at her infusion. Hard copy was nowhere to be found.   Per Dr. Shen Ray, ok to call in prescription as previously written.  Called in prescription to [facility] (Gloria, pharmacist).  Pt concerned there was a third prescription that Marge discussed today and RN needed to call in 2 prescription.  After reviewing with Marge, spoke with Katherine, friend, telling her that Marge stated there were only 2 prescriptions today; prochlorperazine she received in infusion before leaving and lorazepam (Ativan) that this RN called into [facility].  Katherine requested author call and leave message on pt's home phone.  RN left message and clinic nurse line number for pt to call if any questions.

## 2018-02-12 NOTE — PATIENT INSTRUCTIONS
February 2018 Sunday Monday Tuesday Wednesday Thursday Friday Saturday                       1     2     UMP NEW    8:15 AM   (60 min.)   Shen Ray MD   [facility]ly [facility]     UMP CONSULT   10:00 AM   (90 min.)   Zaire Madison MD   [facility]     P [facility] LAB DRAW    1:00 PM   (15 min.)    [facility] DRAW   [facility] Lab Draw     P TCT/SIM SUITE    2:00 PM   (60 min.)   Zaire Madison MD   [facility] 3       4     5     6     7     8     UMP TREATMENT PLAN VISIT    7:00 AM   (15 min.)   Zaire Madison MD   [facility] Clinic     Outpatient Visit    8:06 AM   [facility]     IR CHEST PORT PLACEMENT >5 YRS    8:15 AM   (75 min.)   [id number]   [facility] Imaging     INSERT PORT VASCULAR ACCESS    9:45 AM   Zaire Moreira PA-C   [facility] OR 9     10       11     12     UMP EXTERNAL RADIATION TREATMT   11:00 AM   (30 min.)   P RAD ONC SANIYA   [facility]     UMP ON TREATMENT VISIT   11:30 AM   (15 min.)   Zaire Madison MD   [facility]     P [facility] DRAW    1:00 PM   (15 min.)    [facility] DRAW   [facility] Lab Draw     P ONC INFUSION 120    1:30 PM   (120 min.)   UC ONCOLOGY INFUSION   [facility] 13     UMP EXTERNAL RADIATION TREATMT   10:30 AM   (15 min.)   P RAD ONC SANIYA   [facility] 14     UMP EXTERNAL RADIATION TREATMT   10:30 AM   (15 min.)   P RAD ONC SANIYA   [facility] 15     UMP EXTERNAL RADIATION TREATMT   10:30 AM   (15 min.)   P RAD ONC SANIYA   [facility] 16     UMP RETURN    9:15 AM   (30 min.)   Shen Ray MD   [facility]     UMP EXTERNAL RADIATION TREATMT   10:30 AM   (15 min.)   P RAD ONC SANIYA   [facility] 17       18     19     UMP EXTERNAL RADIATION TREATMT    8:30 AM   (15 min.)   P RAD  ONC SANIYA   [facility]     [facility] ON TREATMENT VISIT    8:45 AM   (15 min.)   Zaire Madison MD   [facility] 20     UMP EXTERNAL RADIATION TREATMT    8:30 AM   (15 min.)   UMP RAD ONC SANIYA   [facility] 21     UMP TCT/SIM SUITE    9:30 AM   (60 min.)   Zaire Madison MD   [facility]     UMP EXTERNAL RADIATION TREATMT   10:30 AM   (15 min.)   UMP RAD ONC SANIYA   [facility] 22     UMP EXTERNAL RADIATION TREATMT    8:30 AM   (15 min.)   UMP RAD ONC SANIYA   [facility] 23     UMP EXTERNAL RADIATION TREATMT    8:30 AM   (15 min.)   UMP RAD ONC SANIYA   [facility] 24       25     26     UMP EXTERNAL RADIATION TREATMT    9:00 AM   (15 min.)   UMP RAD ONC SANIYA   [facility]     UMP ON TREATMENT VISIT    9:15 AM   (15 min.)   Zaire Madison MD   [facility]     CONSULT HOD    9:30 AM   (60 min.)   June Alonso RD   [facility] [place] 27     UMP EXTERNAL RADIATION TREATMT    9:00 AM   (15 min.)   UMP RAD ONC SANIYA   [facility] 28     UMP EXTERNAL RADIATION TREATMT    9:00 AM   (15 min.)   UMP RAD ONC SANIYA   [facility]                           March 2018 Sunday Monday Tuesday Wednesday Thursday Friday Saturday                       1     UMP EXTERNAL RADIATION TREATMT    9:00 AM   (15 min.)   UMP RAD ONC SANIYA   [facility] 2     UMP EXTERNAL RADIATION TREATMT    9:00 AM   (15 min.)   UMP RAD ONC SANIYA   [facility] 3       4     5     UMP EXTERNAL RADIATION TREATMT    9:00 AM   (15 min.)   UMP RAD ONC SANIYA   [facility]     UMP ON TREATMENT VISIT    9:15 AM   (15 min.)   Zaire Madison MD   [facility] 6     UMP EXTERNAL RADIATION TREATMT    9:00 AM   (15 min.)   UMP RAD ONC SANIYA   [facility] 7     UMP EXTERNAL RADIATION  TREATMT    9:00 AM   (15 min.)   UMP RAD ONC SANIYA   [facility] 8     UMP EXTERNAL RADIATION TREATMT    9:00 AM   (15 min.)   UMP RAD ONC SANYIA   [facility] 9     UMP EXTERNAL RADIATION TREATMT    9:00 AM   (15 min.)   UMP RAD ONC SANIYA   [facility] 10       11     12     UMP EXTERNAL RADIATION TREATMT    9:00 AM   (15 min.)   UMP RAD ONC SANIYA   [facility]     UMP ON TREATMENT VISIT    9:15 AM   (15 min.)   Zaire Madison MD   [facility]     [facility] DRAW   11:30 AM   (15 min.)    [facility] DRAW   [facility] Lab Draw     UMP ONC INFUSION 120   12:00 PM   (120 min.)   UC ONCOLOGY INFUSION   [facility] 13     UMP EXTERNAL RADIATION TREATMT    9:00 AM   (15 min.)   UMP RAD ONC SANIYA   [facility] 14     UMP EXTERNAL RADIATION TREATMT    9:00 AM   (15 min.)   UMP RAD ONC SANIYA   [facility] 15     UMP EXTERNAL RADIATION TREATMT    9:00 AM   (15 min.)   UMP RAD ONC SANIYA   [facility] 16     UMP EXTERNAL RADIATION TREATMT    9:00 AM   (15 min.)   UMP RAD ONC SANIYA   [facility] 17       18     19     UMP EXTERNAL RADIATION TREATMT    9:00 AM   (15 min.)   UMP RAD ONC SANIYA   [facility]     UMP ON TREATMENT VISIT    9:15 AM   (15 min.)   Zaire Madison MD   [facility] 20     UMP EXTERNAL RADIATION TREATMT    9:00 AM   (15 min.)   UMP RAD ONC SANIYA   [facility] 21     UMP EXTERNAL RADIATION TREATMT    9:00 AM   (15 min.)   UMP RAD ONC SANIYA   [facility] 22     UMP EXTERNAL RADIATION TREATMT    9:00 AM   (15 min.)   UMP RAD ONC SANIYA   [facility] 23     UMP EXTERNAL RADIATION TREATMT    9:00 AM   (15 min.)   UMP RAD ONC SANIYA   [facility] 24       25     26     27     28     29     30     31                  Lab Results:  Recent Results (from the past 12  hour(s))   CBC with platelets differential    Collection Time: 02/12/18  1:18 PM   Result Value Ref Range    WBC 6.7 4.0 - 11.0 10e9/L    RBC Count 4.35 3.8 - 5.2 10e12/L    Hemoglobin 13.5 11.7 - 15.7 g/dL    Hematocrit 41.0 35.0 - 47.0 %    MCV 94 78 - 100 fl    MCH 31.0 26.5 - 33.0 pg    MCHC 32.9 31.5 - 36.5 g/dL    RDW 13.0 10.0 - 15.0 %    Platelet Count 184 150 - 450 10e9/L    Diff Method Automated Method     % Neutrophils 79.9 %    % Lymphocytes 11.2 %    % Monocytes 7.6 %    % Eosinophils 0.6 %    % Basophils 0.4 %    % Immature Granulocytes 0.3 %    Nucleated RBCs 0 0 /100    Absolute Neutrophil 5.4 1.6 - 8.3 10e9/L    Absolute Lymphocytes 0.8 0.8 - 5.3 10e9/L    Absolute Monocytes 0.5 0.0 - 1.3 10e9/L    Absolute Eosinophils 0.0 0.0 - 0.7 10e9/L    Absolute Basophils 0.0 0.0 - 0.2 10e9/L    Abs Immature Granulocytes 0.0 0 - 0.4 10e9/L    Absolute Nucleated RBC 0.0    Comprehensive metabolic panel    Collection Time: 02/12/18  1:18 PM   Result Value Ref Range    Sodium 138 133 - 144 mmol/L    Potassium 3.8 3.4 - 5.3 mmol/L    Chloride 104 94 - 109 mmol/L    Carbon Dioxide 29 20 - 32 mmol/L    Anion Gap 5 3 - 14 mmol/L    Glucose 125 (H) 70 - 99 mg/dL    Urea Nitrogen 17 7 - 30 mg/dL    Creatinine 0.60 0.52 - 1.04 mg/dL    GFR Estimate >90 >60 mL/min/1.7m2    GFR Estimate If Black >90 >60 mL/min/1.7m2    Calcium 8.7 8.5 - 10.1 mg/dL    Bilirubin Total 1.4 (H) 0.2 - 1.3 mg/dL    Albumin 3.6 3.4 - 5.0 g/dL    Protein Total 6.8 6.8 - 8.8 g/dL    Alkaline Phosphatase 98 40 - 150 U/L    ALT 42 0 - 50 U/L    AST 33 0 - 45 U/L     Contact Numbers    [facility] [place] Main Line: [phone]  [facility] [place] [facility] Triage:  [phone]    Call triage with chills and/or temperature greater than or equal to 100.5, uncontrolled nausea/vomiting, diarrhea, constipation, dizziness, shortness of breath, chest pain, bleeding, unexplained bruising, or any new/concerning symptoms, questions/concerns.     If you are having any concerning symptoms or wish to  speak to a provider before your next infusion visit, please call your care coordinator or triage to notify them so we can adequately serve you.       After Hours: [phone]    If after hours, weekends, or holidays, call main hospital  and ask for Oncology doctor on call.

## 2018-02-12 NOTE — PROGRESS NOTES
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 Taylor presents to clinic today for her weekly on-treatment visit. She tolerated initiation of radiotherapy very well with no significant acute complications. She had many questions regarding logistics of her therapy as well as post-treatment follow-up which were reviewed with her at length.    ROS:   Nutrition Alteration  Diet Type: Patient's Preference    Skin  Skin Reaction: No changes      Gastrointestinal  Nausea: None      Pain Assessment  0-10 Pain Scale: 0    Objective:   Weight: 48.1 kg    General: Alert, oriented and in no acute distress  Cardiac: Extremities are warm and well-perfused  Respiratory: No wheezing, stridor or respiratory distress  Skin: No erythema    Treatment-related toxicities (CTCAE v4.0):  None    Assessment:    Ms. 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 She is receiving definitive chemoradiotherapy and has tolerated the initiation of radiation without any unexpected therapy-induced toxicities.    Plan:   1. Continue radiotherapy  2. Referral placed for nutrition for baseline evaluation and on-treatment follow-up  3. Discussed skin and bowel cares during the course of therapy    Mosaiq chart and setup information reviewed  MVCT images reviewed    Medication Review  Med list reviewed with patient?: Yes    Zaire Madison MD/PhD  Department of Radiation Oncology  [facility]

## 2018-02-12 NOTE — MR AVS SNAPSHOT
After Visit Summary   2/12/2018    Elizabeth Taylor    MRN: [id number]           Patient Information     Date Of Birth          1940        Visit Information        Provider Department      2/12/2018 11:30 AM Zaire Madison MD Radiation Oncology Clinic        Today's Diagnoses     Malignant neoplasm of anal canal (H)    -  1       Follow-ups after your visit        Additional Services     NUTRITION REFERRAL       Your provider has referred you to: [facility]: [facility] (on call location)  - [place] [phone]   [email or web address]    Please be aware that coverage of these services is subject to the terms and limitations of your health insurance plan.  Call member services at your health plan with any benefit or coverage questions.      Please bring the following to your appointment:      >>   This referral request   >>   Any documents given to you for this referral  >>   Any specific questions you have about diet or food choices                  Your next 10 appointments already scheduled     Feb 12, 2018  1:00 PM CST   [facility] Lab Draw with  [facility] DRAW   [facility]    [address] [place] [address]  [address] [place] [id number] [phone]            Feb 12, 2018  1:30 PM CST   Infusion 120 with UC ONCOLOGY INFUSION, UC 22 ATC   [facility] ([facility])    [address]  [address] [place] [id number] [phone]            Feb 13, 2018 10:30 AM CST   EXTERNAL RADIATION TREATMENT with [facility] RAD ONC SANIYA   Radiation Oncology Clinic ([facility])    [facility] Medical [facility]  1st Floor  [address] [facility] [address]   [phone]            Feb 14, 2018 10:30 AM CST   EXTERNAL RADIATION TREATMENT with [facility] RAD ONC SANIYA   Radiation Oncology Clinic ([facility])    [facility]  Medical Ctr  1st Floor  [address] [facility] [address] [facility] [address]   [phone]            Feb 15, 2018 10:30 AM CST   EXTERNAL RADIATION TREATMENT with [facility] RAD ONC SANIYA   Radiation Oncology Clinic ([facility])    [facility] Medical Ctr  1st Floor  [address] [address]   [phone]            Feb 16, 2018  9:30 AM CST   (Arrive by 9:15 AM)   Return Visit with Shen Ray MD   [facility] ([facility] [place])    [address]  [address]  [facility] [address]   [phone]            Feb 16, 2018 10:30 AM CST   EXTERNAL RADIATION TREATMENT with [facility] RAD ONC SANIYA   Radiation Oncology Clinic ([facility])    [facility]  1st Floor  [address] [facility] [address]   [phone]            Feb 19, 2018  8:30 AM CST   EXTERNAL RADIATION TREATMENT with [facility] RAD ONC SANIYA   Radiation Oncology Clinic ([facility])    [facility]  1st Floor  [address] [facility] [address]   [phone]            Feb 19, 2018  8:45 AM CST   ON TREATMENT VISIT with Zaire Madison MD   Radiation Oncology Clinic ([facility])    [facility] Medical [facility]tr  1st Floor  [address] [address]   [phone]              Who to contact     Please call your clinic at [phone] to:    Ask questions about your health    Make or cancel appointments    Discuss your medicines    Learn about your test results    Speak to your doctor            Additional Information About Your Visit        [employer] Information     [employer] gives you secure access to your electronic health record. If you see a primary care provider, you can also send messages to your care team and make appointments. If you have questions, please call your primary care clinic.  If you do not have a primary care provider, please call [phone] and  they will assist you.      [employer] is an electronic gateway that provides easy, online access to your medical records. With [employer], you can request a clinic appointment, read your test results, renew a prescription or communicate with your care team.     To access your existing account, please contact your [facility] or call [phone] for assistance.        Care EveryWhere ID     This is your Care EveryWhere ID. This could be used by other organizations to access your [place] medical records  [id number]        Your Vitals Were     BMI (Body Mass Index)                   18.34 kg/m2            Blood Pressure from Last 3 Encounters:   02/08/18 123/83   02/02/18 146/89   01/20/18 144/79    Weight from Last 3 Encounters:   02/12/18 48.1 kg (106 lb)   02/08/18 48.2 kg (106 lb 4.8 oz)   02/02/18 50.1 kg (110 lb 6.4 oz)              We Performed the Following     NUTRITION REFERRAL          Today's Medication Changes          These changes are accurate as of 2/12/18 12:57 PM.  If you have any questions, ask your nurse or doctor.               Start taking these medicines.        Dose/Directions    LORazepam 0.5 MG tablet   Commonly known as:  ATIVAN   Used for:  Malignant neoplasm of anal canal (H)        Dose:  0.5 mg   Take 1 tablet (0.5 mg) by mouth every 4 hours as needed (Anxiety, Nausea/Vomiting or Sleep)   Quantity:  30 tablet   Refills:  1       prochlorperazine 10 MG tablet   Commonly known as:  COMPAZINE   Used for:  Malignant neoplasm of anal canal (H)        Dose:  10 mg   Take 1 tablet (10 mg) by mouth every 6 hours as needed (Nausea/Vomiting)   Quantity:  30 tablet   Refills:  1            Where to get your medicines      These medications were sent to [place], [place] - [address]  [address], [facility] [address]    Hours:  TRANSPLANT PHONE NUMBER [phone] Phone:  [phone]     prochlorperazine  10 MG tablet         Some of these will need a paper prescription and others can be bought over the counter.  Ask your nurse if you have questions.     Bring a paper prescription for each of these medications     LORazepam 0.5 MG tablet                Primary Care Provider Office Phone # Fax #    Baron Seth [phone] [address]  [facility] [address]        Equal Access to Services     IONA DALEY : Hadii aad ku hadasho Soomaali, waaxda luqadaha, qaybta kaalmada adeegyada, waxay idiin hayaan adeeg khjosephsh lalillian . So [facility] [phone].    ATENCIÓN: Si habla espcliff, tiene a ghosh disposición servicios gratuitos de asistencia lingüística. [facility] [phone].    We comply with applicable federal civil rights laws and [place] laws. We do not discriminate on the basis of race, color, national origin, age, disability, sex, sexual orientation, or gender identity.            Thank you!     Thank you for choosing [facility]  for your care. Our goal is always to provide you with excellent care. Hearing back from our patients is one way we can continue to improve our services. Please take a few minutes to complete the written survey that you may receive in the mail after your visit with us. Thank you!             Your Updated Medication List - Protect others around you: Learn how to safely use, store and throw away your medicines at www.disposemymeds.org.          This list is accurate as of 2/12/18 12:57 PM.  Always use your most recent med list.                   Brand Name Dispense Instructions for use Diagnosis    CALCIUM 500/VITAMIN D PO           hydrocortisone 2.5 % cream    ANUSOL-HC    30 g    Place rectally 2 times daily    External hemorrhoids       LORazepam 0.5 MG tablet    ATIVAN    30 tablet    Take 1 tablet (0.5 mg) by mouth every 4 hours as needed (Anxiety, Nausea/Vomiting or Sleep)    Malignant neoplasm of anal canal (H)       pramipexole 0.125 MG tablet     MIRAPEX    60 tablet    Take one tablet 2-3 hours before bedtime, may increase to 2 tablets after one week if needed    Restless leg syndrome       prochlorperazine 10 MG tablet    COMPAZINE    30 tablet    Take 1 tablet (10 mg) by mouth every 6 hours as needed (Nausea/Vomiting)    Malignant neoplasm of anal canal (H)       TYLENOL EXTRA STRENGTH PO      1 TABLET EVERY 4 HOURS AS NEEDED        vitamin B complex with vitamin C Tabs tablet      Take 1 tablet by mouth daily

## 2018-02-12 NOTE — MR AVS SNAPSHOT
After Visit Summary   2/12/2018    Elizabeth Taylor    MRN: [id number]           Patient Information     Date Of Birth          1940        Visit Information        Provider Department      2/12/2018 1:30 PM UC 22 ATC; UC ONCOLOGY INFUSION [facility]        Today's Diagnoses     Malignant neoplasm of anal canal (H)    -  1      Care Instructions          February 2018 Sunday Monday Tuesday Wednesday Thursday Friday Saturday                       1     2     UMP NEW    8:15 AM   (60 min.)   Shen Ray MD   [facility]     [facility] CONSULT   10:00 AM   (90 min.)   Zaire Madison MD   Radiation Oncology Clinic     [facility] DRAW    1:00 PM   (15 min.)    [facility] DRAW   [facility] Lab Draw     [facility] TCT/SIM SUITE    2:00 PM   (60 min.)   Zaire Madison MD   Radiation Oncology Clinic 3       4     5     6     7     8     [facility] TREATMENT PLAN VISIT    7:00 AM   (15 min.)   Zaire Madison MD   Radiation Oncology Clinic     Outpatient Visit    8:06 AM   [facility]     IR CHEST PORT PLACEMENT >5 YRS    8:15 AM   (75 min.)   [id number]   [facility]     INSERT PORT VASCULAR ACCESS    9:45 AM   Zaire Moreira PA-C    OR 9     10       11     12     UMP EXTERNAL RADIATION TREATMT   11:00 AM   (30 min.)   [facility] RAD ONC SANIYA   Radiation Oncology [facility] ON TREATMENT VISIT   11:30 AM   (15 min.)   Zaire Madison MD   Radiation Oncology Clinic     [facility] DRAW    1:00 PM   (15 min.)    [facility] DRAW   [facility] Draw     [facility] ONC INFUSION 120    1:30 PM   (120 min.)   UC ONCOLOGY INFUSION   [facility] 13     UMP EXTERNAL RADIATION TREATMT   10:30 AM   (15 min.)   [facility] RAD ONC SANIYA   Radiation Oncology Clinic 14     P EXTERNAL RADIATION TREATMT   10:30 AM   (15 min.)   [facility] RAD ONC SANIYA   Radiation Oncology [facility]  15     UMP EXTERNAL RADIATION TREATMT   10:30 AM   (15 min.)   UMP RAD ONC SANIYA   Radiation Oncology Clinic 16     UMP RETURN    9:15 AM   (30 min.)   Shen Ray MD   [facility]     UMP EXTERNAL RADIATION TREATMT   10:30 AM   (15 min.)   P RAD ONC SANIYA   Radiation Oncology Clinic 17       18     19     [facility] EXTERNAL RADIATION TREATMT    8:30 AM   (15 min.)   P RAD ONC SANIYA   Radiation Oncology Clinic     [facility] ON TREATMENT VISIT    8:45 AM   (15 min.)   Zaire Madison MD   [facility]cology [facility] 20     [facility] EXTERNAL RADIATION TREATMT    8:30 AM   (15 min.)   P RAD ONC SANIYA   [facility]cology Clinic 21     [facility] TCT/SIM SUITE    9:30 AM   (60 min.)   Zaire Madison MD   Radiation Oncology Clinic     [facility] EXTERNAL RADIATION TREATMT   10:30 AM   (15 min.)   P RAD ONC SANIYA   Radiation Oncology Clinic 22     [facility] EXTERNAL RADIATION TREATMT    8:30 AM   (15 min.)   P RAD ONC SANIYA   Radiation Oncology Clinic 23     [facility] EXTERNAL RADIATION TREATMT    8:30 AM   (15 min.)   P RAD ONC SANIYA   Radiation Oncology Clinic 24       25     26     UMP EXTERNAL RADIATION TREATMT    9:00 AM   (15 min.)   P RAD ONC SANIYA   Radiation Oncology Clinic     [facility] ON TREATMENT VISIT    9:15 AM   (15 min.)   Zaire Madison MD   Radiation Oncology Clinic     CONSULT HOD    9:30 AM   (60 min.)   June Alonso RD   [facility] [place] Services 27     UMP EXTERNAL RADIATION TREATMT    9:00 AM   (15 min.)   P RAD ONC SANIYA   Radiation Oncology Clinic 28     [facility] EXTERNAL RADIATION TREATMT    9:00 AM   (15 min.)   P RAD ONC SANIYA   Radiation Oncology Clinic                           March 2018 Sunday Monday Tuesday Wednesday Thursday Friday Saturday                       1     UMP EXTERNAL RADIATION TREATMT    9:00 AM   (15 min.)   P RAD ONC SANIYA   Radiation Oncology Clinic 2     UMP EXTERNAL RADIATION TREATMT    9:00 AM   (15 min.)   P RAD ONC SANIYA    Radiation Oncology Clinic 3       4     5     [facility] EXTERNAL RADIATION TREATMT    9:00 AM   (15 min.)   UMP RAD ONC SANIYA   Radiation Oncology Clinic     [facility] ON TREATMENT VISIT    9:15 AM   (15 min.)   Zaire Madison MD   [facility] Oncology Clinic 6     [facility] EXTERNAL RADIATION TREATMT    9:00 AM   (15 min.)   UMP RAD ONC SANIYA   Radiation Oncology Clinic 7     UMP EXTERNAL RADIATION TREATMT    9:00 AM   (15 min.)   UMP RAD ONC SANIYA   Radiation Oncology Clinic 8     [facility] EXTERNAL RADIATION TREATMT    9:00 AM   (15 min.)   UMP RAD ONC SANIYA   Radiation Oncology Clinic 9     [facility] EXTERNAL RADIATION TREATMT    9:00 AM   (15 min.)   UMP RAD ONC SANIYA   Radiation Oncology Clinic 10       11     12     [facility] EXTERNAL RADIATION TREATMT    9:00 AM   (15 min.)   UMP RAD ONC SANIYA   Radiation Oncology Clinic     [facility] ON TREATMENT VISIT    9:15 AM   (15 min.)   Zaire Madison MD   Radiation Oncology Clinic     [facility] DRAW   11:30 AM   (15 min.)    [facility] LAB DRAW   [facility] Lab Draw     UMP ONC INFUSION 120   12:00 PM   (120 min.)   UC ONCOLOGY INFUSION   [facility] 13     UMP EXTERNAL RADIATION TREATMT    9:00 AM   (15 min.)   UMP RAD ONC SANIYA   Radiation Oncology Clinic 14     [facility] EXTERNAL RADIATION TREATMT    9:00 AM   (15 min.)   UMP RAD ONC SANIYA   Radiation Oncology Clinic 15     [facility] EXTERNAL RADIATION TREATMT    9:00 AM   (15 min.)   P RAD ONC SANIYA   Radiation Oncology Clinic 16     [facility] EXTERNAL RADIATION TREATMT    9:00 AM   (15 min.)   UMP RAD ONC SANIYA   Radiation Oncology Clinic 17       18     19     [facility] EXTERNAL RADIATION TREATMT    9:00 AM   (15 min.)   UMP RAD ONC SANIYA   Radiation Oncology Clinic     [facility] ON TREATMENT VISIT    9:15 AM   (15 min.)   Zaire Madison MD   Radiation Oncology Clinic 20     [facility] EXTERNAL RADIATION TREATMT    9:00 AM   (15 min.)   UMP RAD ONC SANIYA   Radiation Oncology Clinic 21     UMP EXTERNAL RADIATION TREATMT    9:00 AM    (15 min.)   [facility] RAD ONC SANIYA   Radiation Oncology Clinic 22     [facility] EXTERNAL RADIATION TREATMT    9:00 AM   (15 min.)   [facility] RAD ONC SANIYA   Radiation Oncology Clinic 23     [facility] EXTERNAL RADIATION TREATMT    9:00 AM   (15 min.)   [facility] RAD ONC SANIYA   Radiation Oncology [facility] 24       25     26     27     28     29     30     31                  Lab Results:  Recent Results (from the past 12 hour(s))   CBC with platelets differential    Collection Time: 02/12/18  1:18 PM   Result Value Ref Range    WBC 6.7 4.0 - 11.0 10e9/L    RBC Count 4.35 3.8 - 5.2 10e12/L    Hemoglobin 13.5 11.7 - 15.7 g/dL    Hematocrit 41.0 35.0 - 47.0 %    MCV 94 78 - 100 fl    MCH 31.0 26.5 - 33.0 pg    MCHC 32.9 31.5 - 36.5 g/dL    RDW 13.0 10.0 - 15.0 %    Platelet Count 184 150 - 450 10e9/L    Diff Method Automated Method     % Neutrophils 79.9 %    % Lymphocytes 11.2 %    % Monocytes 7.6 %    % Eosinophils 0.6 %    % Basophils 0.4 %    % Immature Granulocytes 0.3 %    Nucleated RBCs 0 0 /100    Absolute Neutrophil 5.4 1.6 - 8.3 10e9/L    Absolute Lymphocytes 0.8 0.8 - 5.3 10e9/L    Absolute Monocytes 0.5 0.0 - 1.3 10e9/L    Absolute Eosinophils 0.0 0.0 - 0.7 10e9/L    Absolute Basophils 0.0 0.0 - 0.2 10e9/L    Abs Immature Granulocytes 0.0 0 - 0.4 10e9/L    Absolute Nucleated RBC 0.0    Comprehensive metabolic panel    Collection Time: 02/12/18  1:18 PM   Result Value Ref Range    Sodium 138 133 - 144 mmol/L    Potassium 3.8 3.4 - 5.3 mmol/L    Chloride 104 94 - 109 mmol/L    Carbon Dioxide 29 20 - 32 mmol/L    Anion Gap 5 3 - 14 mmol/L    Glucose 125 (H) 70 - 99 mg/dL    Urea Nitrogen 17 7 - 30 mg/dL    Creatinine 0.60 0.52 - 1.04 mg/dL    GFR Estimate >90 >60 mL/min/1.7m2    GFR Estimate If Black >90 >60 mL/min/1.7m2    Calcium 8.7 8.5 - 10.1 mg/dL    Bilirubin Total 1.4 (H) 0.2 - 1.3 mg/dL    Albumin 3.6 3.4 - 5.0 g/dL    Protein Total 6.8 6.8 - 8.8 g/dL    Alkaline Phosphatase 98 40 - 150 U/L    ALT 42 0 - 50 U/L    AST 33 0 - 45 U/L      Contact Numbers    [facility] Main Line: [phone]  [facility] Triage:  [phone]    Call triage with chills and/or temperature greater than or equal to 100.5, uncontrolled nausea/vomiting, diarrhea, constipation, dizziness, shortness of breath, chest pain, bleeding, unexplained bruising, or any new/concerning symptoms, questions/concerns.     If you are having any concerning symptoms or wish to speak to a provider before your next infusion visit, please call your care coordinator or triage to notify them so we can adequately serve you.       After Hours: [phone]    If after hours, weekends, or holidays, call main hospital  and ask for Oncology doctor on call.             Follow-ups after your visit        Your next 10 appointments already scheduled     Feb 13, 2018 10:30 AM CST   EXTERNAL RADIATION TREATMENT with [facility] RAD ONC SANIYA   Radiation Oncology Clinic ([facility])    [facility]  1st Floor  [address] [address]   [phone]            Feb 14, 2018 10:30 AM CST   EXTERNAL RADIATION TREATMENT with [facility] RAD ONC SANIYA   Radiation Oncology Clinic ([facility])    [facility]  1st Floor  [address] [address]   [phone]            Feb 15, 2018 10:30 AM CST   EXTERNAL RADIATION TREATMENT with [facility] RAD ONC SANIYA   Radiation Oncology Clinic ([facility])    [facility]  1st Floor  [address] [address]   [phone]            Feb 16, 2018  9:30 AM CST   (Arrive by 9:15 AM)   Return Visit with Shen Ray MD   [facility] ([facility])    [address]  [address]  [place] [address]   [phone]            Feb 16, 2018 10:30 AM CST   EXTERNAL RADIATION TREATMENT with P RAD ONC SANIYA   Radiation Oncology Clinic ([facility])    [facility]  1st Floor  [address]  [facility] [address] [facility] [address]   [phone]            Feb 19, 2018  8:30 AM CST   EXTERNAL RADIATION TREATMENT with [facility] RAD ONC SANIYA   Radiation Oncology Clinic ([facility])    [facility]  1st Floor  [address] [address]   [phone]            Feb 19, 2018  8:45 AM CST   ON TREATMENT VISIT with Zaire Madison MD   Radiation Oncology Clinic ([facility])    [facility]  1st Floor  [address] [address]   [phone]            Feb 20, 2018  8:30 AM CST   EXTERNAL RADIATION TREATMENT with [facility] RAD ONC SANIYA   Radiation Oncology Clinic ([facility])    [facility]  1st Floor  [address] [address]   [phone]            Feb 21, 2018  9:30 AM CST   TCT/SIM Suite Visit with Zaire Madison MD   Radiation Oncology Clinic ([facility])    [facility]  1st Floor  [address] [address]   [phone]            Feb 21, 2018 10:30 AM CST   EXTERNAL RADIATION TREATMENT with [facility] RAD ONC SANIYA   Radiation Oncology Clinic ([facility])    [facility]  1st Floor  [address] [address]   [phone]              Who to contact     If you have questions or need follow up information about today's clinic visit or your schedule please contact [facility] directly at [phone].  Normal or non-critical lab and imaging results will be communicated to you by MyChart, letter or phone within 4 business days after the clinic has received the results. If you do not hear from us within 7 days, please contact the clinic through [employer] or phone. If you have a critical or abnormal lab result, we will notify you by phone as soon as possible.  Submit refill requests through [employer] or call your pharmacy and  they will forward the refill request to us. Please allow 3 business days for your refill to be completed.          Additional Information About Your Visit        [employer] Information     [employer] gives you secure access to your electronic health record. If you see a primary care provider, you can also send messages to your care team and make appointments. If you have questions, please call your primary care clinic.  If you do not have a primary care provider, please call [phone] and they will assist you.        Care EveryWhere ID     This is your Care EveryWhere ID. This could be used by other organizations to access your [place] medical records  [id number]        Your Vitals Were     Pulse Temperature Respirations Pulse Oximetry BMI (Body Mass Index)       91 98.9  F (37.2  C) 16 96% 19.1 kg/m2        Blood Pressure from Last 3 Encounters:   02/12/18 146/79   02/08/18 123/83   02/02/18 146/89    Weight from Last 3 Encounters:   02/12/18 50.1 kg (110 lb 6.4 oz)   02/12/18 48.1 kg (106 lb)   02/08/18 48.2 kg (106 lb 4.8 oz)              We Performed the Following     CBC with platelets differential     Comprehensive metabolic panel          Today's Medication Changes          These changes are accurate as of 2/12/18  4:08 PM.  If you have any questions, ask your nurse or doctor.               Start taking these medicines.        Dose/Directions    LORazepam 0.5 MG tablet   Commonly known as:  ATIVAN   Used for:  Malignant neoplasm of anal canal (H)        Dose:  0.5 mg   Take 1 tablet (0.5 mg) by mouth every 4 hours as needed (Anxiety, Nausea/Vomiting or Sleep)   Quantity:  30 tablet   Refills:  1       prochlorperazine 10 MG tablet   Commonly known as:  COMPAZINE   Used for:  Malignant neoplasm of anal canal (H)        Dose:  10 mg   Take 1 tablet (10 mg) by mouth every 6 hours as needed (Nausea/Vomiting)   Quantity:  30 tablet   Refills:  1            Where to get your medicines      These medications were  sent to [place], [place] - [address]  [address], [facility] [address]    Hours:  TRANSPLANT PHONE NUMBER [phone] Phone:  [phone]     prochlorperazine 10 MG tablet         Some of these will need a paper prescription and others can be bought over the counter.  Ask your nurse if you have questions.     Bring a paper prescription for each of these medications     LORazepam 0.5 MG tablet                Primary Care Provider Office Phone # Fax #    Baron Seth [phone]       [address]  [facility] [address]        Equal Access to Services     IONA DALEY : Hadii chelo Grubbs, waaxda sonu, qaybta kaalmada eliseo, cyndy catalan. So [facility] [phone].    ATENCIÓN: Si habla español, tiene a ghosh disposición servicios gratuitos de asistencia lingüística. Llame al [phone].    We comply with applicable federal civil rights laws and [place] laws. We do not discriminate on the basis of race, color, national origin, age, disability, sex, sexual orientation, or gender identity.            Thank you!     Thank you for choosing [facility]  for your care. Our goal is always to provide you with excellent care. Hearing back from our patients is one way we can continue to improve our services. Please take a few minutes to complete the written survey that you may receive in the mail after your visit with us. Thank you!             Your Updated Medication List - Protect others around you: Learn how to safely use, store and throw away your medicines at www.disposemymeds.org.          This list is accurate as of 2/12/18  4:08 PM.  Always use your most recent med list.                   Brand Name Dispense Instructions for use Diagnosis    CALCIUM 500/VITAMIN D PO           hydrocortisone 2.5 % cream    ANUSOL-HC    30 g    Place rectally 2 times daily    External  hemorrhoids       LORazepam 0.5 MG tablet    ATIVAN    30 tablet    Take 1 tablet (0.5 mg) by mouth every 4 hours as needed (Anxiety, Nausea/Vomiting or Sleep)    Malignant neoplasm of anal canal (H)       pramipexole 0.125 MG tablet    MIRAPEX    60 tablet    Take one tablet 2-3 hours before bedtime, may increase to 2 tablets after one week if needed    Restless leg syndrome       prochlorperazine 10 MG tablet    COMPAZINE    30 tablet    Take 1 tablet (10 mg) by mouth every 6 hours as needed (Nausea/Vomiting)    Malignant neoplasm of anal canal (H)       TYLENOL EXTRA STRENGTH PO      1 TABLET EVERY 4 HOURS AS NEEDED        vitamin B complex with vitamin C Tabs tablet      Take 1 tablet by mouth daily

## 2018-02-13 ENCOUNTER — CARE COORDINATION (OUTPATIENT)
Dept: ONCOLOGY | Facility: CLINIC | Age: 78
End: 2018-02-13

## 2018-02-13 ENCOUNTER — HOME INFUSION (PRE-WILLOW HOME INFUSION) (OUTPATIENT)
Dept: PHARMACY | Facility: CLINIC | Age: 78
End: 2018-02-13

## 2018-02-13 ENCOUNTER — APPOINTMENT (OUTPATIENT)
Dept: RADIATION ONCOLOGY | Facility: CLINIC | Age: 78
End: 2018-02-13
Attending: RADIOLOGY
Payer: MEDICARE

## 2018-02-13 PROCEDURE — 77333 RADIATION TREATMENT AID(S): CPT | Mod: XU | Performed by: RADIOLOGY

## 2018-02-13 PROCEDURE — 77386 ZZH IMRT TREATMENT DELIVERY, COMPLEX: CPT | Performed by: RADIOLOGY

## 2018-02-13 NOTE — PROGRESS NOTES
Received phone message from pt's friend, Katherine (forwarded by colleague, Crissy) requesting call back to discuss scheduling appt with nutrition and a scan.  When spoke with Katherine later, she indicated that she thought everything was already scheduled.  Found appt on 2/26 with June Cooper, nutritionist.  She also stated she thought scan was scheduled for 2/21/18.  RN noted appt scheduled as with Dr. Madison where notes indicate pt to have a verification scan.  Sent message to colleague, Tennille Brennan, to confirm this is scan Katherine is discussing.    Also per conversation with Katherine, she will be out of town afternoon of 2/15 (after bringing pt to morning appts) thru 2/21/18.  Pt has another, non PHI, friend (Marine)that is going to provide transportation for Elizabeth while Katherine is gone.

## 2018-02-14 ENCOUNTER — APPOINTMENT (OUTPATIENT)
Dept: RADIATION ONCOLOGY | Facility: CLINIC | Age: 78
End: 2018-02-14
Attending: RADIOLOGY
Payer: MEDICARE

## 2018-02-14 PROCEDURE — 77386 ZZH IMRT TREATMENT DELIVERY, COMPLEX: CPT | Performed by: RADIOLOGY

## 2018-02-14 NOTE — PROGRESS NOTES
This is a recent snapshot of the patient's [facility] medical record.  For current drug dose and complete information and questions, call [phone] or In Basket pool, fv home infusion ([id number])  CSN Number:  [id number]

## 2018-02-15 ENCOUNTER — APPOINTMENT (OUTPATIENT)
Dept: RADIATION ONCOLOGY | Facility: CLINIC | Age: 78
End: 2018-02-15
Attending: RADIOLOGY
Payer: MEDICARE

## 2018-02-15 PROCEDURE — 77386 ZZH IMRT TREATMENT DELIVERY, COMPLEX: CPT | Performed by: RADIOLOGY

## 2018-02-16 ENCOUNTER — ONCOLOGY VISIT (OUTPATIENT)
Dept: ONCOLOGY | Facility: CLINIC | Age: 78
End: 2018-02-16
Attending: INTERNAL MEDICINE
Payer: MEDICARE

## 2018-02-16 ENCOUNTER — APPOINTMENT (OUTPATIENT)
Dept: RADIATION ONCOLOGY | Facility: CLINIC | Age: 78
End: 2018-02-16
Attending: RADIOLOGY
Payer: MEDICARE

## 2018-02-16 ENCOUNTER — CARE COORDINATION (OUTPATIENT)
Dept: ONCOLOGY | Facility: CLINIC | Age: 78
End: 2018-02-16

## 2018-02-16 VITALS
RESPIRATION RATE: 18 BRPM | TEMPERATURE: 98.5 F | DIASTOLIC BLOOD PRESSURE: 77 MMHG | SYSTOLIC BLOOD PRESSURE: 121 MMHG | HEART RATE: 83 BPM | OXYGEN SATURATION: 96 %

## 2018-02-16 VITALS
OXYGEN SATURATION: 99 % | TEMPERATURE: 98.5 F | SYSTOLIC BLOOD PRESSURE: 131 MMHG | HEIGHT: 64 IN | BODY MASS INDEX: 19.16 KG/M2 | WEIGHT: 112.21 LBS | RESPIRATION RATE: 16 BRPM | DIASTOLIC BLOOD PRESSURE: 82 MMHG | HEART RATE: 78 BPM

## 2018-02-16 DIAGNOSIS — C21.1 MALIGNANT NEOPLASM OF ANAL CANAL (H): Primary | ICD-10-CM

## 2018-02-16 PROCEDURE — 99215 OFFICE O/P EST HI 40 MIN: CPT | Mod: ZP | Performed by: INTERNAL MEDICINE

## 2018-02-16 PROCEDURE — 25000128 H RX IP 250 OP 636: Mod: ZF | Performed by: INTERNAL MEDICINE

## 2018-02-16 PROCEDURE — G0463 HOSPITAL OUTPT CLINIC VISIT: HCPCS | Mod: ZF

## 2018-02-16 PROCEDURE — 36591 DRAW BLOOD OFF VENOUS DEVICE: CPT

## 2018-02-16 PROCEDURE — 77386 ZZH IMRT TREATMENT DELIVERY, COMPLEX: CPT | Performed by: RADIOLOGY

## 2018-02-16 PROCEDURE — 77336 RADIATION PHYSICS CONSULT: CPT | Performed by: RADIOLOGY

## 2018-02-16 RX ORDER — HEPARIN SODIUM (PORCINE) LOCK FLUSH IV SOLN 100 UNIT/ML 100 UNIT/ML
500 SOLUTION INTRAVENOUS ONCE
Status: COMPLETED | OUTPATIENT
Start: 2018-02-16 | End: 2018-02-16

## 2018-02-16 RX ADMIN — SODIUM CHLORIDE, PRESERVATIVE FREE 500 UNITS: 5 INJECTION INTRAVENOUS at 13:14

## 2018-02-16 ASSESSMENT — PAIN SCALES - GENERAL
PAINLEVEL: NO PAIN (0)
PAINLEVEL: NO PAIN (0)

## 2018-02-16 NOTE — NURSING NOTE
"Oncology Rooming Note    February 16, 2018 9:23 AM   Elizabeth Taylor is a 77 year old female who presents for:    Chief Complaint   Patient presents with     Oncology Clinic Visit     Return visit related to Anal Cancer     Initial Vitals: /82  Pulse 78  Temp 98.5  F (36.9  C) (Tympanic)  Resp 16  Ht 1.619 m (5' 3.74\")  Wt 50.9 kg (112 lb 3.4 oz)  SpO2 99%  BMI 19.42 kg/m2 Estimated body mass index is 19.42 kg/(m^2) as calculated from the following:    Height as of this encounter: 1.619 m (5' 3.74\").    Weight as of this encounter: 50.9 kg (112 lb 3.4 oz). Body surface area is 1.51 meters squared.  No Pain (0) Comment: Data Unavailable   No LMP recorded. Patient is postmenopausal.  Allergies reviewed: Yes  Medications reviewed: Yes    Medications: Medication refills not needed today.  Pharmacy name entered into [facility]: [facility] - [place], [place] - [address]    Clinical concerns: Patient states she has itching in perineal area. Provider was notified.    10 minutes for nursing intake (face to face time)     Heather Alvarenga LPN            "

## 2018-02-16 NOTE — MR AVS SNAPSHOT
After Visit Summary   2/16/2018    Elizabeth Taylor    MRN: [id number]           Patient Information     Date Of Birth          1940        Visit Information        Provider Department      2/16/2018 9:30 AM Shen Ray MD [facility]        Today's Diagnoses     Malignant neoplasm of anal canal (H)    -  1       Follow-ups after your visit        Your next 10 appointments already scheduled     Feb 24, 2018 12:00 PM CST   Infusion 120 with UC ONCOLOGY INFUSION, UC 15 ATC   [facility] ([facility])    [address]  [address]  [place] [address]   [phone]            Feb 26, 2018  8:00 AM CST   Consult HOD with June Cooper RD   [facility] [place] ([facility])    [address]  [place] [address]               Feb 26, 2018  9:00 AM CST   EXTERNAL RADIATION TREATMENT with [facility] RAD ONC SANIYA   Radiation Oncology Clinic ([facility])    [facility]  1st Floor  [address] [address]   [phone]            Feb 26, 2018  9:15 AM CST   ON TREATMENT VISIT with Zaire Madison MD   Radiation Oncology Clinic ([facility])    [facility]  1st Floor  [address] [address]   [phone]            Feb 27, 2018  9:00 AM CST   EXTERNAL RADIATION TREATMENT with [facility] RAD ONC SANIYA   Radiation Oncology Clinic ([facility])    [facility]  1st Floor  [address] [address]   [phone]            Feb 28, 2018  9:00 AM CST   EXTERNAL RADIATION TREATMENT with [facility] RAD ONC SANIYA   Radiation Oncology Clinic ([facility])    [facility]  1st Floor  [address] [address]   [phone]            Mar 01, 2018  9:00 AM CST    EXTERNAL RADIATION TREATMENT with [facility] RAD ONC SANIYA   Radiation Oncology Clinic ([facility])    [facility]  1st Floor  [address]  [place] [address]   [phone]            Mar 02, 2018  9:00 AM CST   EXTERNAL RADIATION TREATMENT with [facility] RAD ONC SANIYA   Radiation Oncology Clinic ([facility])    [facility]  1st Floor  [address]  [place] [address]   [phone]            Mar 02, 2018 10:00 AM CST   (Arrive by 9:45 AM)   Return Visit with Shen Ray MD   [facility] ([facility] [place] [facility])    [address]  [address]  [place] [address] [phone]              Future tests that were ordered for you today     Open Future Orders        Priority Expected Expires Ordered    CBC with platelets differential Routine 3/2/2018 2/23/2019 2/23/2018    Comprehensive metabolic panel Routine 3/2/2018 2/23/2019 2/23/2018            Who to contact     If you have questions or need follow up information about today's clinic visit or your schedule please contact [facility] directly at [phone].  Normal or non-critical lab and imaging results will be communicated to you by Med[employer], letter or phone within 4 business days after the clinic has received the results. If you do not hear from us within 7 days, please contact the clinic through [employer] or phone. If you have a critical or abnormal lab result, we will notify you by phone as soon as possible.  Submit refill requests through [facility] or call your pharmacy and they will forward the refill request to us. Please allow 3 business days for your refill to be completed.          Additional Information About Your Visit        [employer] Information     [facility] gives you secure access to your electronic health record. If you see a primary care provider, you can also send messages to your care team and make  "appointments. If you have questions, please call your primary care clinic.  If you do not have a primary care provider, please call [phone] and they will assist you.        Care EveryWhere ID     This is your Care EveryWhere ID. This could be used by other organizations to access your [place] medical records  [id number]        Your Vitals Were     Pulse Temperature Respirations Height Pulse Oximetry BMI (Body Mass Index)    78 98.5  F (36.9  C) (Tympanic) 16 1.619 m (5' 3.74\") 99% 19.42 kg/m2       Blood Pressure from Last 3 Encounters:   02/23/18 121/73   02/16/18 121/77   02/16/18 131/82    Weight from Last 3 Encounters:   02/23/18 49.1 kg (108 lb 4.8 oz)   02/21/18 50.3 kg (111 lb)   02/16/18 50.9 kg (112 lb 3.4 oz)              Today, you had the following     No orders found for display       Primary Care Provider Office Phone # Fax #    Baron Seth [phone] [address]  [facility] [address]        Equal Access to Services     [facility]: Hadii aad ku hadasho Sotheodoreali, waaxda luqadaha, qaybta kaalmada adeargentinayada, cyndy catalan. So [facility] [phone].    ATENCIÓN: Si habla español, tiene a ghosh disposición servicios gratuitos de asistencia lingüística. [facility] [phone].    We comply with applicable federal civil rights laws and [place] laws. We do not discriminate on the basis of race, color, national origin, age, disability, sex, sexual orientation, or gender identity.            Thank you!     Thank you for choosing [facility]  for your care. Our goal is always to provide you with excellent care. Hearing back from our patients is one way we can continue to improve our services. Please take a few minutes to complete the written survey that you may receive in the mail after your visit with us. Thank you!             Your Updated Medication List - Protect others around you: Learn how to safely use, store and throw away " your medicines at www.disposemymeds.org.          This list is accurate as of 2/16/18 11:59 PM.  Always use your most recent med list.                   Brand Name Dispense Instructions for use Diagnosis    CALCIUM 500/VITAMIN D PO           hydrocortisone 2.5 % cream    ANUSOL-HC    30 g    Place rectally 2 times daily    External hemorrhoids       LORazepam 0.5 MG tablet    ATIVAN    30 tablet    Take 1 tablet (0.5 mg) by mouth every 4 hours as needed (Anxiety, Nausea/Vomiting or Sleep)    Malignant neoplasm of anal canal (H)       pramipexole 0.125 MG tablet    MIRAPEX    60 tablet    Take one tablet 2-3 hours before bedtime, may increase to 2 tablets after one week if needed    Restless leg syndrome       prochlorperazine 10 MG tablet    COMPAZINE    30 tablet    Take 1 tablet (10 mg) by mouth every 6 hours as needed (Nausea/Vomiting)    Malignant neoplasm of anal canal (H)       TYLENOL EXTRA STRENGTH PO      1 TABLET EVERY 4 HOURS AS NEEDED        vitamin B complex with vitamin C Tabs tablet      Take 1 tablet by mouth daily

## 2018-02-16 NOTE — PROGRESS NOTES
"RN met with pt today and introduced self.  Discussed how to contact triage vs how to contact RN care coordinator.  Pt due to go to radiation therapy however, pump started beeping prior to leaving clinic.  Pump screen indicating it is running low.  Discussed situation with infusion nurseAlicia who came out and met pt.  Reviewed with her how to \"acknowledge\" alert on pump screen and advised pt to return to infusion area about 12:30 or after her radiation and they will work her in earlier than 3:30 appt as pump will be done earlier.  Pt appreciated assistance.          "

## 2018-02-16 NOTE — PATIENT INSTRUCTIONS
Contact Numbers  [facility]: [phone]  (Choose Option 3 for triage RN)  After Hours: [phone]    Call triage with chills and/or temperature greater than or equal to 100.5, uncontrolled nausea/vomiting, diarrhea, constipation, dizziness, shortness of breath, chest pain, bleeding, unexplained bruising, or any new/concerning symptoms, questions/concerns.     If after hours, weekends, or holidays, call the main clinic number. Calls will be forwarded to the hospital , please ask for the adult oncology doctor on call.     If you are having any concerning symptoms or wish to speak to a provider before your next infusion visit, please call your care coordinator or triage to notify them so we can adequately serve you.     If you need a refill on a narcotic prescription, please call triage or your care coordinator before your infusion appointment.             February 2018 Sunday Monday Tuesday Wednesday Thursday Friday Saturday                       1     2     [facility] NEW    8:15 AM   (60 min.)   Shen Ray MD   [facility]     [facility] CONSULT   10:00 AM   (90 min.)   Zaire Madison MD   Radiation Oncology Clinic     [facility]    1:00 PM   (15 min.)   [facility] LAB DRAW   [facility] Lab Draw     [facility] TCT/SIM SUITE    2:00 PM   (60 min.)   Zaire Madison MD   Radiation Oncology Clinic 3       4     5     6     7     8     [facility] TREATMENT PLAN VISIT    7:00 AM   (15 min.)   Zaire Madison MD   Radiation Oncology Clinic     Outpatient Visit    8:06 AM   [facility]     IR CHEST PORT PLACEMENT >5 YRS    8:15 AM   (75 min.)   [id number]   [facility] ASC Imaging     INSERT PORT VASCULAR ACCESS    9:45 AM   Zaire Moreira PA-C    OR 9     10       11     12     [facility] EXTERNAL RADIATION TREATMT   11:00 AM   (30 min.)   [facility] RAD ONC SANIYA   Radiation Oncology Clinic     [facility] ON TREATMENT VISIT   11:30 AM    (15 min.)   Zaire Madison MD   Radiation Oncology Clinic     [facility] DRAW    1:00 PM   (15 min.)    [facility]     [facility] ONC INFUS[facility] 120    1:30 PM   (120 min.)   UC ONCOLOGY INFUSION   [facility] 13     [facility] EXTERNAL RADIATION TREATMT   10:30 AM   (15 min.)   [facility] RAD ONC SANIYA   Radiation Oncology Clinic 14     [facility] EXTERNAL RADIATION TREATMT   10:30 AM   (15 min.)   P RAD ONC SANIYA   Radiation Oncology Clinic 15     [facility] EXTERNAL RADIATION TREATMT   10:30 AM   (15 min.)   P RAD ONC SANIYA   Radiation Oncology Clinic 16     [facility] RETURN    9:15 AM   (30 min.)   Shen Ray MD   [facility]     [facility] EXTERNAL RADIATION TREATMT   10:30 AM   (15 min.)   P RAD ONC SANIYA   Radiation Oncology Clinic     P ONC INFUSION 60    3:30 PM   (60 min.)   [facility] ONCOLOGY INFUSION   [facility] 17       18     19     [facility] EXTERNAL RADIATION TREATMT    8:30 AM   (15 min.)   P RAD ONC SANIYA   Radiation Oncology Clinic     [facility] ON TREATMENT VISIT    8:45 AM   (15 min.)   Zaire Madison MD   [facility] 20     [facility] EXTERNAL RADIATION TREATMT    8:30 AM   (15 min.)   P RAD ONC SANIYA   Radiation Oncology Clinic 21     [facility] TCT/SIM SUITE    9:30 AM   (60 min.)   Zaire Madison MD   Radiation Oncology Clinic     [facility] EXTERNAL RADIATION TREATMT   10:30 AM   (15 min.)   [facility] RAD ONC SANIYA   Radiation Oncology Clinic 22     [facility] EXTERNAL RADIATION TREATMT    8:30 AM   (15 min.)   [facility] RAD ONC SANIYA   Radiation Oncology Clinic 23     [facility] EXTERNAL RADIATION TREATMT    8:30 AM   (15 min.)   P RAD ONC SANIYA   [facility]iation Oncology Clinic 24     [facility] DRAW   11:30 AM   (15 min.)    [facility] DRAW   [facility] Lab Draw     [facility] ONC INFUSION 120   12:00 PM   (120 min.)   [facility] ONCOLOGY INFUSION   [facility]   25     26     [facility] EXTERNAL RADIATION TREATMT    9:00 AM   (15 min.)    P RAD ONC SANIYA   Radiation Oncology Clinic     [facility] ON TREATMENT VISIT    9:15 AM   (15 min.)   Zaire Madison MD   Radiation Oncology Clinic     CONSULT HOD    9:30 AM   (60 min.)   June Alonso RD   [facility] [place], [facility] Services 27     [facility] EXTERNAL RADIATION TREATMT    9:00 AM   (15 min.)   P RAD ONC SANIYA   Radiation Oncology Clinic 28     [facility] EXTERNAL RADIATION TREATMT    9:00 AM   (15 min.)   P RAD ONC SANIYA   Radiation Oncology Clinic                           March 2018 Sunday Monday Tuesday Wednesday Thursday Friday Saturday                       1     [facility] EXTERNAL RADIATION TREATMT    9:00 AM   (15 min.)   P RAD ONC SANIYA   Radiation Oncology Clinic 2     [facility] LAB DRAW    6:30 AM   (15 min.)    [facility] LAB DRAW   [facility] Lab Draw     P ONC INFUSION 120    7:00 AM   (120 min.)   UC ONCOLOGY INFUSION   [facility]     [facility] EXTERNAL RADIATION TREATMT    9:00 AM   (15 min.)   P RAD ONC SANIYA   Radiation Oncology Clinic 3       4     5     [facility] EXTERNAL RADIATION TREATMT    9:00 AM   (15 min.)   P RAD ONC SANIYA   Radiation Oncology Clinic     [facility] ON TREATMENT VISIT    9:15 AM   (15 min.)   Zaire Madison MD   Radiation Oncology Clinic 6     [facility] EXTERNAL RADIATION TREATMT    9:00 AM   (15 min.)   P RAD ONC SANIYA   Radiation Oncology Clinic 7     [facility] EXTERNAL RADIATION TREATMT    9:00 AM   (15 min.)   P RAD ONC SANIYA   Radiation Oncology Clinic 8     [facility] EXTERNAL RADIATION TREATMT    9:00 AM   (15 min.)   P RAD ONC SANIYA   Radiation Oncology Clinic 9     [facility] EXTERNAL RADIATION TREATMT    9:00 AM   (15 min.)   P RAD ONC SANIYA   Radiation Oncology Clinic     [facility] LAB DRAW   12:00 PM   (15 min.)   [facility] DRAW   [facility] Lab Draw     [facility] RETURN   12:15 PM   (30 min.)   Shen Ray [facility]    1:00 PM   (120 min.)   UC ONCOLOGY INFUSION   [facility]  [facility] 10       11     12     [facility] EXTERNAL RADIATION TREATMT    9:00 AM   (15 min.)   P RAD ONC SANIYA   Radiation Oncology Clinic     [facility] ON TREATMENT VISIT    9:15 AM   (15 min.)   Zaire Madison MD   Rad[facility] Oncology Clinic 13     [facility] EXTERNAL RADIATION TREATMT    9:00 AM   (15 min.)   [facility] RAD ONC SANIYA   Radiation Oncology Clinic 14     [facility] EXTERNAL RADIATION TREATMT    9:00 AM   (15 min.)   P RAD ONC SANIYA   Radiation Oncology Clinic 15     [facility] EXTERNAL RADIATION TREATMT    9:00 AM   (15 min.)   P RAD ONC SANIYA   Radiation Oncology Clinic 16     [facility] EXTERNAL RADIATION TREATMT    9:00 AM   (15 min.)   P RAD ONC SANIYA   Radiation Oncology Clinic 17       18     19     [facility] EXTERNAL RADIATION TREATMT    9:00 AM   (15 min.)   P RAD ONC SANIYA   Radiation Oncology Clinic     [facility] ON TREATMENT VISIT    9:15 AM   (15 min.)   Zaire Madison MD   Radiation Oncology Clinic 20     [facility] EXTERNAL RADIATION TREATMT    9:00 AM   (15 min.)   P RAD ONC SANIYA   Radiation Oncology Clinic 21     [facility] EXTERNAL RADIATION TREATMT    9:00 AM   (15 min.)   P RAD ONC SANIYA   Radiation Oncology Clinic 22     UMP EXTERNAL RADIATION TREATMT    9:00 AM   (15 min.)   P RAD ONC SANIYA   Radiation Oncology Clinic 23     [facility] EXTERNAL RADIATION TREATMT    9:00 AM   (15 min.)   P RAD ONC SANIYA   Radiation Oncology Clinic 24       25     26     27     28     29     30     31                  Lab Results:  No results found for this or any previous visit (from the past 12 hour(s)).

## 2018-02-16 NOTE — LETTER
2/16/2018       RE: Elizabeth Taylor  [address]   [place] [address]     Dear Colleague,    Thank you for referring your patient, Elizabeth Taylor, to the [facility]. Please see a copy of my visit note below.      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 She underwent a colonoscopy in April 2017 which noted to have small anal fissure along with internal hemorrhoids. Symptoms continued and  she was referred to colorectal surgery for further evaluation. On exam she was found to have an 1.5 cm anal lesion on the left side along with left groin palpable adenopathy. Biopsy of anal mass came back positive for squamous cell carcinoma. Patient underwent staging workup with MRI pelvis and a PET scan- showed PET avid left anal canal mass along with small left inguinal FDG avid lymph nodes.     02/12/18- Started on concurrent chemoradiation with 5FU/Mitomycin    SUBJECTIVE   The patient is here for routine follow-up. She is tolerating the treatment well so far. Denies nausea/vomiting, fever/chills, diarrhea, mouth sores, rectal  pain or any other complaints continues to have good appetite and energy levels        PAST MEDICAL HISTORY     Past Medical History:   Diagnosis Date     Endometriosis, site unspecified      Thyroiditis, unspecified     Thryoiditis-resolved         CURRENT OUTPATIENT MEDICATIONS     Current Outpatient Prescriptions   Medication Sig Dispense Refill     LORazepam (ATIVAN) 0.5 MG tablet Take 1 tablet (0.5 mg) by mouth every 4 hours as needed (Anxiety,  Nausea/Vomiting or Sleep) 30 tablet 1     vitamin B complex with vitamin C (VITAMIN  B COMPLEX) TABS tablet Take 1 tablet by mouth daily       Calcium Carbonate-Vitamin D (CALCIUM 500/VITAMIN D PO)        pramipexole (MIRAPEX) 0.125 MG tablet Take one tablet 2-3 hours before bedtime, may increase to 2 tablets after one week if needed 60 tablet 5     TYLENOL EXTRA STRENGTH OR 1 TABLET EVERY 4 HOURS AS NEEDED       prochlorperazine (COMPAZINE) 10 MG tablet Take 1 tablet (10 mg) by mouth every 6 hours as needed (Nausea/Vomiting) (Patient not taking: Reported on 2/16/2018) 30 tablet 1     hydrocortisone (ANUSOL-HC) 2.5 % cream Place rectally 2 times daily (Patient not taking: Reported on 2/16/2018) 30 g 1        ALLERGIES     Allergies   Allergen Reactions     Darvon [Propoxyphene]      Had reaction in teen years     Penicillins      As a child     Ketoconazole Rash        REVIEW OF SYSTEMS   As above in the HPI, o/w complete 12-point ROS was negative.     PHYSICAL EXAM   B/P: 131/82, T: 98.5, P: 78, R: 16  SpO2 Readings from Last 4 Encounters:   02/16/18 99%   02/12/18 96%   02/08/18 99%   02/02/18 100%     Wt Readings from Last 3 Encounters:   02/16/18 50.9 kg (112 lb 3.4 oz)   02/12/18 50.1 kg (110 lb 6.4 oz)   02/12/18 48.1 kg (106 lb)     GEN: NAD  EYES:PERRLA  Mouth/ENT: Oropharynx is clear.  NECK: no cervical or supraclavicular lymphadenopathy  LUNGS: clear bilaterally  CV: regular, no murmurs, rubs, or gallops  ABDOMEN: soft, non-tender, non-distended, normal bowel sounds, no hepatosplenomegaly by percussion or palpation  EXT: warm, well perfused, no edema  NEURO: alert  SKIN: no rashes     LABORATORY AND IMAGING STUDIES     Recent Labs   Lab Test  02/12/18   1318  02/02/18   1342   NA  138  140   POTASSIUM  3.8  3.9   CHLORIDE  104  104   CO2  29  30   ANIONGAP  5  6   BUN  17  18   CR  0.60  0.72   GLC  125*  107*   ELISE  8.7  8.9     Recent Labs   Lab Test  02/12/18   1318  02/02/18   1342  01/20/18   1203    12/05/12   1611   WBC  6.7  5.8  5.9   < >  5.4   HGB  13.5  13.4  14.0   < >  12.4   PLT  184  166  196   < >  204   MCV  94  94  96   < >  91   NEUTROPHIL  79.9  75.9   --    --   77.2*    < > = values in this interval not displayed.     Recent Labs   Lab Test  02/12/18   1318  02/02/18   1342  01/20/18   1201   BILITOTAL  1.4*  1.2  1.4*   ALKPHOS  98  105  115   ALT  42  43  57*   AST  33  31  44   ALBUMIN  3.6  3.7  4.1     TSH   Date Value Ref Range Status   03/16/2017 2.01 0.40 - 4.00 mU/L Final   ]       ASSESSMENT AND PLAN     77-year-old female with no significant past medical history who presented with complaints of bright red blood per rectum and was recently found to have an anal mass which on biopsy came back for positive for invasive squamous cell carcinoma. Staging workup showed a PET avid anal mass along with hypermetabolic left inguinal adenopathy.     - Squamous cell carcinoma Anal canal  T2N1- IIIA  NCCN guidelines were reviewed  Started on definitive chemoradiation with infusional FU 1000 mg/m2 on days 1 to 4 and 29 to 32, plus mitomycin 10 mg/m2 on days 1 and 29 (as tolerated), maximum 20 mg per dose- day 1 on 02/12/18  Tolerating it well so far with active complaints.   I will have the patient come back for infusion  appointments weekly for labs and IV fluids as needed.  Return to clinic in 3 weeks prior to day 29 of chemotherapy.    The patient is ready to learn, no apparent learning barriers were identified, Diagnosis and treatment plans were explained to the patient. The patient expressed understanding of the content. The patient questions were answered to her satisfaction.       Chart documentation with Dragon Voice recognition Software. Although reviewed after completion, some words and grammatical errors may remain.  Shen Ray MD  Attending Physician   Hematology/Medical Oncology

## 2018-02-16 NOTE — PROGRESS NOTES
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 She underwent a colonoscopy in April 2017 which noted to have small anal fissure along with internal hemorrhoids. Symptoms continued and  she was referred to colorectal surgery for further evaluation. On exam she was found to have an 1.5 cm anal lesion on the left side along with left groin palpable adenopathy. Biopsy of anal mass came back positive for squamous cell carcinoma. Patient underwent staging workup with MRI pelvis and a PET scan- showed PET avid left anal canal mass along with small left inguinal FDG avid lymph nodes.     02/12/18- Started on concurrent chemoradiation with 5FU/Mitomycin    SUBJECTIVE   The patient is here for routine follow-up. She is tolerating the treatment well so far. Denies nausea/vomiting, fever/chills, diarrhea, mouth sores, rectal  pain or any other complaints continues to have good appetite and energy levels        PAST MEDICAL HISTORY     Past Medical History:   Diagnosis Date     Endometriosis, site unspecified      Thyroiditis, unspecified     Thryoiditis-resolved         CURRENT OUTPATIENT MEDICATIONS     Current Outpatient Prescriptions   Medication Sig Dispense Refill     LORazepam (ATIVAN) 0.5 MG tablet Take 1 tablet (0.5 mg) by mouth every 4 hours as needed (Anxiety, Nausea/Vomiting or Sleep) 30 tablet 1     vitamin B complex with vitamin C (VITAMIN  B COMPLEX) TABS tablet Take 1 tablet by mouth daily       Calcium Carbonate-Vitamin D (CALCIUM 500/VITAMIN D PO)        pramipexole (MIRAPEX) 0.125 MG tablet Take one tablet 2-3  hours before bedtime, may increase to 2 tablets after one week if needed 60 tablet 5     TYLENOL EXTRA STRENGTH OR 1 TABLET EVERY 4 HOURS AS NEEDED       prochlorperazine (COMPAZINE) 10 MG tablet Take 1 tablet (10 mg) by mouth every 6 hours as needed (Nausea/Vomiting) (Patient not taking: Reported on 2/16/2018) 30 tablet 1     hydrocortisone (ANUSOL-HC) 2.5 % cream Place rectally 2 times daily (Patient not taking: Reported on 2/16/2018) 30 g 1        ALLERGIES     Allergies   Allergen Reactions     Darvon [Propoxyphene]      Had reaction in teen years     Penicillins      As a child     Ketoconazole Rash        REVIEW OF SYSTEMS   As above in the HPI, o/w complete 12-point ROS was negative.     PHYSICAL EXAM   B/P: 131/82, T: 98.5, P: 78, R: 16  SpO2 Readings from Last 4 Encounters:   02/16/18 99%   02/12/18 96%   02/08/18 99%   02/02/18 100%     Wt Readings from Last 3 Encounters:   02/16/18 50.9 kg (112 lb 3.4 oz)   02/12/18 50.1 kg (110 lb 6.4 oz)   02/12/18 48.1 kg (106 lb)     GEN: NAD  EYES:PERRLA  Mouth/ENT: Oropharynx is clear.  NECK: no cervical or supraclavicular lymphadenopathy  LUNGS: clear bilaterally  CV: regular, no murmurs, rubs, or gallops  ABDOMEN: soft, non-tender, non-distended, normal bowel sounds, no hepatosplenomegaly by percussion or palpation  EXT: warm, well perfused, no edema  NEURO: alert  SKIN: no rashes     LABORATORY AND IMAGING STUDIES     Recent Labs   Lab Test  02/12/18   1318  02/02/18   1342   NA  138  140   POTASSIUM  3.8  3.9   CHLORIDE  104  104   CO2  29  30   ANIONGAP  5  6   BUN  17  18   CR  0.60  0.72   GLC  125*  107*   ELISE  8.7  8.9     Recent Labs   Lab Test  02/12/18   1318  02/02/18   1342  01/20/18   1201   12/05/12   1611   WBC  6.7  5.8  5.9   < >  5.4   HGB  13.5  13.4  14.0   < >  12.4   PLT  184  166  196   < >  204   MCV  94  94  96   < >  91   NEUTROPHIL  79.9  75.9   --    --   77.2*    < > = values in this interval not displayed.     Recent Labs   Lab Test   02/12/18   1318  02/02/18   1342  01/20/18   1201   BILITOTAL  1.4*  1.2  1.4*   ALKPHOS  98  105  115   ALT  42  43  57*   AST  33  31  44   ALBUMIN  3.6  3.7  4.1     TSH   Date Value Ref Range Status   03/16/2017 2.01 0.40 - 4.00 mU/L Final   ]       ASSESSMENT AND PLAN     77-year-old female with no significant past medical history who presented with complaints of bright red blood per rectum and was recently found to have an anal mass which on biopsy came back for positive for invasive squamous cell carcinoma. Staging workup showed a PET avid anal mass along with hypermetabolic left inguinal adenopathy.     - Squamous cell carcinoma Anal canal  T2N1- IIIA  NCCN guidelines were reviewed  Started on definitive chemoradiation with infusional FU 1000 mg/m2 on days 1 to 4 and 29 to 32, plus mitomycin 10 mg/m2 on days 1 and 29 (as tolerated), maximum 20 mg per dose- day 1 on 02/12/18  Tolerating it well so far with active complaints.   I will have the patient come back for infusion  appointments weekly for labs and IV fluids as needed.  Return to clinic in 3 weeks prior to day 29 of chemotherapy.    The patient is ready to learn, no apparent learning barriers were identified, Diagnosis and treatment plans were explained to the patient. The patient expressed understanding of the content. The patient questions were answered to her satisfaction.       Chart documentation with Dragon Voice recognition Software. Although reviewed after completion, some words and grammatical errors may remain.  Shen Ray MD  Attending Physician   Hematology/Medical Oncology

## 2018-02-16 NOTE — MR AVS SNAPSHOT
After Visit Summary   2/16/2018    Elizabeth Taylor    MRN: [id number]           Patient Information     Date Of Birth          1940        Visit Information        Provider Department      2/16/2018 3:30 PM  23 ATC;  ONCOLOGY INFUSION [facility]        Today's Diagnoses     Malignant neoplasm of anal canal (H)    -  1      Care Instructions    Contact Numbers  [facility]: [phone]  (Choose Option 3 for triage RN)  After Hours: [phone]    Call triage with chills and/or temperature greater than or equal to 100.5, uncontrolled nausea/vomiting, diarrhea, constipation, dizziness, shortness of breath, chest pain, bleeding, unexplained bruising, or any new/concerning symptoms, questions/concerns.     If after hours, weekends, or holidays, call the main clinic number. Calls will be forwarded to the hospital , please ask for the adult oncology doctor on call.     If you are having any concerning symptoms or wish to speak to a provider before your next infusion visit, please call your care coordinator or triage to notify them so we can adequately serve you.     If you need a refill on a narcotic prescription, please call triage or your care coordinator before your infusion appointment.             February 2018 Sunday Monday Tuesday Wednesday Thursday Friday Saturday                       1     2     UMP NEW    8:15 AM   (60 min.)   Shen Ray MD   [facility]   10:00 AM   (90 min.)   Zaire Madison MD   Radiation Oncology Clinic     [facility] LAB DRAW    1:00 PM   (15 min.)   [facility] D[facility]   [facility] Lab Draw     [facility] TCT/SIM SUITE    2:00 PM   (60 min.)   Zaire Madison MD   Radiation Oncology Clinic 3       4     5     6     7     8     [facility] TREATMENT PLAN VISIT    7:00 AM   (15 min.)   Zaire Madison MD   Radiation Oncology Clinic     Outpatient Visit    8:06  AM   [facility]     IR CHEST PORT PLACEMENT >5 YRS    8:15 AM   (75 min.)   [id number]   [facility]     INSERT PORT VASCULAR ACCESS    9:45 AM   Zaire Moreira PA-C    OR 9     10       11     12     UMP EXTERNAL RADIATION TREATMT   11:00 AM   (30 min.)   P RAD ONC SANIYA   Radiation Oncology Clinic     [facility] ON TREATMENT VISIT   11:30 AM   (15 min.)   Zaire Madison MD   Radiation Oncology Clinic     [facility] LAB DRAW    1:00 PM   (15 min.)    [facility] LAB DRAW   [facility] Lab Draw     [facility] ONC INFUSION 120    1:30 PM   (120 min.)   UC ONCOLOGY INFUSION   [facility] 13     UMP EXTERNAL RADIATION TREATMT   10:30 AM   (15 min.)   [facility] RAD ONC SANIYA   Radiation Oncology Clinic 14     P EXTERNAL RADIATION TREATMT   10:30 AM   (15 min.)   P RAD ONC SANIYA   [facility] 15     UMP EXTERNAL RADIATION TREATMT   10:30 AM   (15 min.)   [facility] RAD ONC SANIYA   Radiation Oncology Clinic 16     UMP RETURN    9:15 AM   (30 min.)   Shen Ray MD   [facility]     UMP EXTERNAL RADIATION TREATMT   10:30 AM   (15 min.)   [facility] RAD ONC SANIYA   Radiation Oncology Clinic     [facility] ONC INFUSION 60    3:30 PM   (60 min.)   UC ONCOLOGY INFUSION   [facility] 17       18     19     UMP EXTERNAL RADIATION TREATMT    8:30 AM   (15 min.)   P RAD ONC SANIYA   Radiation Oncology Clinic     [facility] ON TREATMENT VISIT    8:45 AM   (15 min.)   Zaire Madison MD   [facility] Oncology Clinic 20     [facility] EXTERNAL RADIATION TREATMT    8:30 AM   (15 min.)   P RAD ONC SANIYA   [facility] Clinic 21     P TCT/SIM SUITE    9:30 AM   (60 min.)   Zaire Madison MD   Radiation Oncology Clinic     P EXTERNAL RADIATION TREATMT   10:30 AM   (15 min.)   P RAD ONC SANIYA   [facility] 22     UMP EXTERNAL RADIATION TREATMT    8:30 AM   (15 min.)   P RAD ONC SANIYA   Radiation Oncology  Clinic 23     UMP EXTERNAL RADIATION TREATMT    8:30 AM   (15 min.)   P RAD ONC SANIYA   [facility] Oncology Clinic 24     P [facility] LAB DRAW   11:30 AM   (15 min.)   [facility] DRAW   [facility] Lab Draw     P ONC INFUSION 120   12:00 PM   (120 min.)   UC ONCOLOGY INFUSION   [facility]   25     26     UMP EXTERNAL RADIATION TREATMT    9:00 AM   (15 min.)   P RAD ONC SANIYA   Radiation Oncology Clinic     [facility] ON TREATMENT VISIT    9:15 AM   (15 min.)   Zaire Madison MD   Radiation Oncology Clinic     CONSULT HOD    9:30 AM   (60 min.)   June Alonso, JUSTO   [facility] [place] 27     UMP EXTERNAL RADIATION TREATMT    9:00 AM   (15 min.)   P RAD ONC SANIYA   Radiation Oncology Clinic 28     [facility] EXTERNAL RADIATION TREATMT    9:00 AM   (15 min.)   P RAD ONC SANIYA   Radiation Oncology Clinic                           March 2018 Sunday Monday Tuesday Wednesday Thursday Friday Saturday                       1     UMP EXTERNAL RADIATION TREATMT    9:00 AM   (15 min.)   P RAD ONC SANIYA   Radiation Oncology Clinic 2     [facility] LAB DRAW    6:30 AM   (15 min.)   [facility] DRAW   [facility] Lab Draw     P ONC INFUSION 120    7:00 AM   (120 min.)   UC ONCOLOGY INFUSION   [facility]     UMP EXTERNAL RADIATION TREATMT    9:00 AM   (15 min.)   P RAD ONC SANIYA   Radiation Oncology Clinic 3       4     5     UMP EXTERNAL RADIATION TREATMT    9:00 AM   (15 min.)   P RAD ONC SANIYA   Radiation Oncology Clinic     [facility] ON TREATMENT VISIT    9:15 AM   (15 min.)   Zaire Madison MD   [facility] Oncology Clinic 6     UMP EXTERNAL RADIATION TREATMT    9:00 AM   (15 min.)   UMP RAD ONC SANIYA   [facility] Oncology Clinic 7     UMP EXTERNAL RADIATION TREATMT    9:00 AM   (15 min.)   P RAD ONC SANIYA   [facility] Oncology Clinic 8     UMP EXTERNAL RADIATION TREATMT    9:00 AM   (15 min.)   UMP RAD ONC SANIYA   Radiation  Oncology Clinic 9     UMP EXTERNAL RADIATION TREATMT    9:00 AM   (15 min.)   P RAD ONC SANIYA   Radiation Oncology Clinic     [facility] LAB DRAW   12:00 PM   (15 min.)   [facility] LAB DRAW   [facility] Lab Draw     UMP RETURN   12:15 PM   (30 min.)   Shen Ray MD   [facility]    1:00 PM   (120 min.)    ONCOLOGY INFUSION   [facility] 10       11     12     UMP EXTERNAL RADIATION TREATMT    9:00 AM   (15 min.)   P RAD ONC SANIYA   Radiation Oncology Clinic     [facility] ON TREATMENT VISIT    9:15 AM   (15 min.)   Zaire Madison MD   [facility] Oncology Clinic 13     UMP EXTERNAL RADIATION TREATMT    9:00 AM   (15 min.)   P RAD ONC SANIYA   Radiation Oncology Clinic 14     UMP EXTERNAL RADIATION TREATMT    9:00 AM   (15 min.)   P RAD ONC SANIYA   Radiation Oncology Clinic 15     UMP EXTERNAL RADIATION TREATMT    9:00 AM   (15 min.)   P RAD ONC SANIYA   Radiation Oncology Clinic 16     UMP EXTERNAL RADIATION TREATMT    9:00 AM   (15 min.)   UMP RAD ONC SANIYA   Rad[facility] Oncology Clinic 17       18     19     UMP EXTERNAL RADIATION TREATMT    9:00 AM   (15 min.)   UMP RAD ONC SANIYA   Radiation Oncology Clinic     [facility] ON TREATMENT VISIT    9:15 AM   (15 min.)   Zaire Madison MD   Radiation Oncology Clinic 20     UMP EXTERNAL RADIATION TREATMT    9:00 AM   (15 min.)   P RAD ONC SANIYA   Radiation Oncology Clinic 21     UMP EXTERNAL RADIATION TREATMT    9:00 AM   (15 min.)   P RAD ONC SANIYA   Radiation Oncology Clinic 22     UMP EXTERNAL RADIATION TREATMT    9:00 AM   (15 min.)   P RAD ONC SANIYA   Radiation Oncology Clinic 23     UMP EXTERNAL RADIATION TREATMT    9:00 AM   (15 min.)   P RAD ONC SANIYA   Radiation Oncology Clinic 24       25     26     27     28     29     30     31                  Lab Results:  No results found for this or any previous visit (from the past 12 hour(s)).            Follow-ups after your  visit        Your next 10 appointments already scheduled     Feb 19, 2018  8:30 AM CST   EXTERNAL RADIATION TREATMENT with UMP RAD ONC SANIYA   Radiation Oncology Clinic ([facility])    [facility]  1st Floor  [address] [address]   [phone]            Feb 19, 2018  8:45 AM CST   ON TREATMENT VISIT with Zaire Madison MD   Radiation Oncology Clinic ([facility])    [facility]  1st Floor  [address] [address]   [phone]            Feb 20, 2018  8:30 AM CST   EXTERNAL RADIATION TREATMENT with UMP RAD ONC SANIYA   Radiation Oncology Clinic ([facility])    [facility]  1st Floor  [address] [address]   [phone]            Feb 21, 2018  9:30 AM CST   TCT/SIM Suite Visit with Zaire Madison MD   Radiation Oncology Clinic ([facility])    [facility]  1st Floor  [address] [address]   [phone]            Feb 21, 2018 10:30 AM CST   EXTERNAL RADIATION TREATMENT with UMP RAD ONC SANIYA   Radiation Oncology Clinic ([facility])    [facility]  1st Floor  [address] [address]   [phone]            Feb 22, 2018  8:30 AM CST   EXTERNAL RADIATION TREATMENT with UMP RAD ONC SANIYA   Radiation Oncology Clinic ([facility])    [facility]  1st Floor  [address] [address]   [phone]            Feb 23, 2018  8:30 AM CST   EXTERNAL RADIATION TREATMENT with UMP RAD ONC SANIYA   Radiation Oncology Clinic ([facility])    [facility]  1st Floor  [address] [address]   [phone]            Feb 24, 2018 11:30 AM CST   [facility] Lab Draw with  [facility] LAB DRAW   [facility] Lab Draw ([facility])    [address]  [address]  [place] [facility] [address] [phone]            Feb 24, 2018 12:00 PM CST   Infusion 120 with UC ONCOLOGY INFUSION, UC 15 ATC   [facility] ([facility])    [address]  [address]  [place] [facility] [place] [id number] [phone]              Who to contact     If you have questions or need follow up information about today's clinic visit or your schedule please contact [facility] directly at [phone].  Normal or non-critical lab and imaging results will be communicated to you by Kindarahart, letter or phone within 4 business days after the clinic has received the results. If you do not hear from us within 7 days, please contact the clinic through [facility] or phone. If you have a critical or abnormal lab result, we will notify you by phone as soon as possible.  Submit refill requests through HealOr or call your pharmacy and they will forward the refill request to us. Please allow 3 business days for your refill to be completed.          Additional Information About Your Visit        MyChart Information     [facility]Or gives you secure access to your electronic health record. If you see a primary care provider, you can also send messages to your care team and make appointments. If you have questions, please call your primary care clinic.  If you do not have a primary care provider, please call [phone] and they will assist you.        Care EveryWhere ID     This is your Care EveryWhere ID. This could be used by other organizations to access your [facility] medical records  [id number]        Your Vitals Were     Pulse Temperature Respirations Pulse Oximetry          83 98.5  F (36.9  C) (Oral) 18 96%         Blood Pressure from Last 3 Encounters:   02/16/18 121/77   02/16/18 131/82   02/12/18 146/79    Weight from Last 3 Encounters:   02/16/18 50.9 kg (112 lb 3.4 oz)   02/12/18 50.1 kg (110 lb 6.4  oz)   02/12/18 48.1 kg (106 lb)              Today, you had the following     No orders found for display       Primary Care Provider Office Phone # Fax #    Baron Seth [phone] [address]  [facility] [address]        Equal Access to Services     IONA DALEY : Hadii aad ku hadasho Soomaali, waaxda luqadaha, qaybta kaalmada adeegyada, waxay toreyin hayaan adeeg padminishirley lalillian ah. So [facility] [phone].    ATENCIÓN: Si habla español, tiene a ghosh disposición servicios gratuitos de asistencia lingüística. [facility] [phone].    We comply with applicable federal civil rights laws and [place] laws. We do not discriminate on the basis of race, color, national origin, age, disability, sex, sexual orientation, or gender identity.            Thank you!     Thank you for choosing [facility]CER CLINIC  for your care. Our goal is always to provide you with excellent care. Hearing back from our patients is one way we can continue to improve our services. Please take a few minutes to complete the written survey that you may receive in the mail after your visit with us. Thank you!             Your Updated Medication List - Protect others around you: Learn how to safely use, store and throw away your medicines at www.disposemymeds.org.          This list is accurate as of 2/16/18  5:28 PM.  Always use your most recent med list.                   Brand Name Dispense Instructions for use Diagnosis    CALCIUM 500/VITAMIN D PO           hydrocortisone 2.5 % cream    ANUSOL-HC    30 g    Place rectally 2 times daily    External hemorrhoids       LORazepam 0.5 MG tablet    ATIVAN    30 tablet    Take 1 tablet (0.5 mg) by mouth every 4 hours as needed (Anxiety, Nausea/Vomiting or Sleep)    Malignant neoplasm of anal canal (H)       pramipexole 0.125 MG tablet    MIRAPEX    60 tablet    Take one tablet 2-3 hours before bedtime, may increase to 2 tablets after one week if needed    Restless leg  syndrome       prochlorperazine 10 MG tablet    COMPAZINE    30 tablet    Take 1 tablet (10 mg) by mouth every 6 hours as needed (Nausea/Vomiting)    Malignant neoplasm of anal canal (H)       TYLENOL EXTRA STRENGTH PO      1 TABLET EVERY 4 HOURS AS NEEDED        vitamin B complex with vitamin C Tabs tablet      Take 1 tablet by mouth daily

## 2018-02-16 NOTE — PROGRESS NOTES
"Infusion Nursing Note:  Elizabeth Taylor presents today for fluorouracil pump disconnect.    Patient seen by provider today: No    Note:   Elizabeth arrived at infusion this morning around 1015.  Patient notified SHAYY Torres to check chemotherapy pump due to beeping.  Pauline notified this writer, who checked pump display and noted it to have 1 hour 52  Minutes remaining to empty.  Patient returned to infusion at 1200. The pump volume was 0 and the chemotherapy bag was empty. Patient reported that she noticed blood backing up in her line several times over the past 4 days. She stated that she noticed it when she \"hung the bag up high like it said to do in the instructions\" so she could shower. She did not cover her port needle during the shower and her dressing appears to be peeling back at the edges so that the port needle is exposed to air.  Patient noted that the other times she noticed blood backed up into the line she was bending over for a prolonged period of time.  She did call [facility] RN line to discuss this concern.     She denies having any nausea, SOB, constipation or lightheadedness. She felt she had \"good energy\" for the majority of the week and was very active, doing her ballet and yoga classes. Today she notes feeling sleepy and tired. Otherwise she offers no concerns.     This writer discussed with patient the importance of only taking baths when she is connected to her infusion pump and to cover her port dressing with Saran Wrap to protect it from water. Writer explained the risk for infection or dislodgement of port needle if port dressing comes off due to moisture, etc.  Pump appears to have finished more than 3 hours early today. Pharmacy staff notified and pump and chemo bag returned to pharmacy for follow-up. Writer also notified patient that [facility] may be calling her to follow-up with her regarding the blood backing up in her tubing and the pump finishing early. Patient verbalized " understanding and was very appreciative.     Intravenous Access:  Implanted Port.  Brisk blood return noted from port prior to removal.     Post Infusion Assessment:  Patient tolerated infusion without incident.  Site patent and intact, free from redness, edema or discomfort.  No evidence of extravasations.  Access discontinued per protocol.    Discharge Plan:   AVS to patient via MYCHART.  Patient will return 02/24 for next appointment.   Patient discharged in stable condition accompanied by: self.  Departure Mode: Ambulatory.    Alicia West RN

## 2018-02-19 ENCOUNTER — APPOINTMENT (OUTPATIENT)
Dept: RADIATION ONCOLOGY | Facility: CLINIC | Age: 78
End: 2018-02-19
Attending: RADIOLOGY
Payer: MEDICARE

## 2018-02-19 PROCEDURE — 77386 ZZH IMRT TREATMENT DELIVERY, COMPLEX: CPT | Performed by: RADIOLOGY

## 2018-02-20 ENCOUNTER — APPOINTMENT (OUTPATIENT)
Dept: RADIATION ONCOLOGY | Facility: CLINIC | Age: 78
End: 2018-02-20
Attending: RADIOLOGY
Payer: MEDICARE

## 2018-02-20 PROCEDURE — 77386 ZZH IMRT TREATMENT DELIVERY, COMPLEX: CPT | Performed by: RADIOLOGY

## 2018-02-21 ENCOUNTER — ALLIED HEALTH/NURSE VISIT (OUTPATIENT)
Dept: RADIATION ONCOLOGY | Facility: CLINIC | Age: 78
End: 2018-02-21
Attending: RADIOLOGY
Payer: MEDICARE

## 2018-02-21 VITALS — WEIGHT: 111 LBS | BODY MASS INDEX: 19.21 KG/M2

## 2018-02-21 DIAGNOSIS — C21.1 MALIGNANT NEOPLASM OF ANAL CANAL (H): Primary | ICD-10-CM

## 2018-02-21 PROCEDURE — 77386 ZZH IMRT TREATMENT DELIVERY, COMPLEX: CPT | Performed by: RADIOLOGY

## 2018-02-21 NOTE — MR AVS SNAPSHOT
After Visit Summary   2/21/2018    Elizabeth Taylor    MRN: [id number]           Patient Information     Date Of Birth          1940        Visit Information        Provider Department      2/21/2018 10:45 AM Zaire Madison MD Radiation Oncology Clinic        Today's Diagnoses     Malignant neoplasm of anal canal (H)    -  1       Follow-ups after your visit        Your next 10 appointments already scheduled     Feb 22, 2018  8:30 AM CST   EXTERNAL RADIATION TREATMENT with [facility] RAD ONC SANIYA   Radiation Oncology Clinic ([facility])    [facility]  1st Floor  [address] [address]   [phone]            Feb 23, 2018  8:30 AM CST   EXTERNAL RADIATION TREATMENT with [facility] RAD ONC SANIYA   Radiation Oncology Clinic ([facility])    [facility]  1st Floor  [address] [address]   [phone]            Feb 24, 2018 11:30 AM CST   [facility] Lab Draw with [facility] LAB DRAW   [facility]    [address]  [address]  [place] [address]   [phone]            Feb 24, 2018 12:00 PM CST   Infusion 120 with UC ONCOLOGY INFUSION, UC 15 ATC   [facility] ([facility])    [address]  [address]  [place] [address]   [phone]            Feb 26, 2018  9:00 AM CST   EXTERNAL RADIATION TREATMENT with [facility] RAD ONC SANIYA   Radiation Oncology Clinic ([facility])    [facility]  1st Floor  [address] [address]   [phone]            Feb 26, 2018  9:15 AM CST   ON TREATMENT VISIT with Zaire Madison MD   Radiation Oncology Clinic ([facility])    [facility]  1st Floor  [address] [address]   [phone]            Feb 27, 2018  9:00 AM CST    EXTERNAL RADIATION TREATMENT with [facility] RAD ONC SAINYA   Radiation Oncology Clinic ([facility])    [facility]  1st Floor  [address] [address]   [phone]            Feb 28, 2018  9:00 AM CST   EXTERNAL RADIATION TREATMENT with [facility] RAD ONC SANIYA   Radiation Oncology Clinic ([facility])    [facility]  1st Floor  [address] [facility] [address] [facility] [address]   [phone]            Mar 01, 2018  9:00 AM CST   EXTERNAL RADIATION TREATMENT with [facility] RAD ONC SANIYA   Radiation Oncology Clinic ([facility])    [facility]  1st Floor  [address] [address]   [phone]              Who to contact     Please call your clinic at [phone] to:    Ask questions about your health    Make or cancel appointments    Discuss your medicines    Learn about your test results    Speak to your doctor            Additional Information About Your Visit        [employer] Information     [employer] gives you secure access to your electronic health record. If you see a primary care provider, you can also send messages to your care team and make appointments. If you have questions, please call your primary care clinic.  If you do not have a primary care provider, please call [phone] and they will assist you.      [employer] is an electronic gateway that provides easy, online access to your medical records. With [employer], you can request a clinic appointment, read your test results, renew a prescription or communicate with your care team.     To access your existing account, please contact your [facility] or call [phone] for assistance.        Care EveryWhere ID     This is your Care EveryWhere ID. This could be used by other organizations to access your [place] medical records  [id number]        Your Vitals Were     BMI (Body Mass Index)                   19.21  kg/m2            Blood Pressure from Last 3 Encounters:   02/16/18 121/77   02/16/18 131/82   02/12/18 146/79    Weight from Last 3 Encounters:   02/21/18 50.3 kg (111 lb)   02/16/18 50.9 kg (112 lb 3.4 oz)   02/12/18 50.1 kg (110 lb 6.4 oz)              Today, you had the following     No orders found for display       Primary Care Provider Office Phone # Fax #    Baron Dread Seth [phone]       [address]  [facility] [address]        Equal Access to Services     [facility]: Hadii chelo kramer hadfabio Somushtaq, waaxda sonu, qaybta kaalmada eliseo, cyndy walton . So [facility] [phone].    ATENCIÓN: Si habla español, tiene a ghosh disposición servicios gratuitos de asistencia lingüística. [facility] [phone].    We comply with applicable federal civil rights laws and [place] laws. We do not discriminate on the basis of race, color, national origin, age, disability, sex, sexual orientation, or gender identity.            Thank you!     Thank you for choosing RADIATION ONCOLOGY CLINIC  for your care. Our goal is always to provide you with excellent care. Hearing back from our patients is one way we can continue to improve our services. Please take a few minutes to complete the written survey that you may receive in the mail after your visit with us. Thank you!             Your Updated Medication List - Protect others around you: Learn how to safely use, store and throw away your medicines at www.disposemymeds.org.          This list is accurate as of 2/21/18  8:24 PM.  Always use your most recent med list.                   Brand Name Dispense Instructions for use Diagnosis    CALCIUM 500/VITAMIN D PO           hydrocortisone 2.5 % cream    ANUSOL-HC    30 g    Place rectally 2 times daily    External hemorrhoids       LORazepam 0.5 MG tablet    ATIVAN    30 tablet    Take 1 tablet (0.5 mg) by mouth every 4 hours as needed (Anxiety, Nausea/Vomiting or Sleep)     Malignant neoplasm of anal canal (H)       pramipexole 0.125 MG tablet    MIRAPEX    60 tablet    Take one tablet 2-3 hours before bedtime, may increase to 2 tablets after one week if needed    Restless leg syndrome       prochlorperazine 10 MG tablet    COMPAZINE    30 tablet    Take 1 tablet (10 mg) by mouth every 6 hours as needed (Nausea/Vomiting)    Malignant neoplasm of anal canal (H)       TYLENOL EXTRA STRENGTH PO      1 TABLET EVERY 4 HOURS AS NEEDED        vitamin B complex with vitamin C Tabs tablet      Take 1 tablet by mouth daily

## 2018-02-21 NOTE — MR AVS SNAPSHOT
After Visit Summary   2/21/2018    Elizabeth Taylor    MRN: [id number]           Patient Information     Date Of Birth          1940        Visit Information        Provider Department      2/21/2018 9:30 AM Zaire Madison MD Radiation Oncology Clinic        Today's Diagnoses     Malignant neoplasm of anal canal (H)    -  1       Follow-ups after your visit        Your next 10 appointments already scheduled     Feb 22, 2018  8:30 AM CST   EXTERNAL RADIATION TREATMENT with [facility] RAD ONC SANIYA   Radiation Oncology Clinic ([facility])    [facility]  1st Floor  [address] [address]   [phone]            Feb 23, 2018  8:30 AM CST   EXTERNAL RADIATION TREATMENT with [facility] RAD ONC SANIYA   Radiation Oncology Clinic ([facility])    [facility]  1st Floor  [address] [address]   [phone]            Feb 24, 2018 11:30 AM CST   [facility] Lab Draw with [facility] LAB DRAW   [facility]    [address]  [address]  [facility] [place] [address]   [phone]            Feb 24, 2018 12:00 PM CST   Infusion 120 with UC ONCOLOGY INFUSION, UC 15 ATC   [facility] ([facility])    [address]  [address]  [facility] [address]   [phone]            Feb 26, 2018  9:00 AM CST   EXTERNAL RADIATION TREATMENT with [facility] RAD ONC SANIYA   Radiation Oncology Clinic ([facility])    [facility]  1st Floor  [address] [address]   [phone]            Feb 26, 2018  9:15 AM CST   ON TREATMENT VISIT with Zaire Madison MD   Radiation Oncology Clinic ([facility])    [facility]  1st Floor  [address] [address]   [phone]            Feb 27, 2018  9:00 AM CST    EXTERNAL RADIATION TREATMENT with [facility] RAD ONC SANIYA   Radiation Oncology Clinic ([facility])    [facility]  1st Floor  [address] [address]   [phone]            Feb 28, 2018  9:00 AM CST   EXTERNAL RADIATION TREATMENT with [facility] RAD ONC SANIYA   Radiation Oncology Clinic ([facility])    [facility]  1st Floor  [address] [facility] [address]   [phone]            Mar 01, 2018  9:00 AM CST   EXTERNAL RADIATION TREATMENT with [facility] RAD ONC SANIYA   Radiation Oncology Clinic ([facility])    [facility]  1st Floor  [address] [address]   [phone]              Who to contact     Please call your clinic at [phone] to:    Ask questions about your health    Make or cancel appointments    Discuss your medicines    Learn about your test results    Speak to your doctor            Additional Information About Your Visit        [employer] Information     [employer] gives you secure access to your electronic health record. If you see a primary care provider, you can also send messages to your care team and make appointments. If you have questions, please call your primary care clinic.  If you do not have a primary care provider, please call [phone] and they will assist you.      [employer] is an electronic gateway that provides easy, online access to your medical records. With [employer], you can request a clinic appointment, read your test results, renew a prescription or communicate with your care team.     To access your existing account, please contact your [facility] or call [phone] for assistance.        Care EveryWhere ID     This is your Care EveryWhere ID. This could be used by other organizations to access your [place] medical records  [id number]         Blood Pressure from Last 3 Encounters:   02/16/18 121/77    02/16/18 131/82   02/12/18 146/79    Weight from Last 3 Encounters:   02/21/18 50.3 kg (111 lb)   02/16/18 50.9 kg (112 lb 3.4 oz)   02/12/18 50.1 kg (110 lb 6.4 oz)              Today, you had the following     No orders found for display       Primary Care Provider Office Phone # Fax #    Baron Seth [phone]       [address] [facility] [address]        Equal Access to Services     [facility] : Hadii chelo kramer hadasho Somushtaq, waaxda luqadaha, qaybta kaalmada adeegyaleland, cyndy walton . So [facility] [phone].    ATENCIÓN: Si habla español, tiene a ghosh disposición servicios gratuitos de asistencia lingüística. LlUniversity [facility]als [facility] [phone].    We comply with applicable federal civil rights laws and [place] laws. We do not discriminate on the basis of race, color, national origin, age, disability, sex, sexual orientation, or gender identity.            Thank you!     Thank you for choosing [facility]COL[facility] CL[facility]IC  for your care. Our goal is always to provide you with excellent care. Hearing back from our patients is one way we can continue to improve our services. Please take a few minutes to complete the written survey that you may receive in the mail after your visit with us. Thank you!             Your Updated Medication List - Protect others around you: Learn how to safely use, store and throw away your medicines at www.disposemymeds.org.          This list is accurate as of 2/21/18  3:46 PM.  Always use your most recent med list.                   Brand Name Dispense Instructions for use Diagnosis    CALCIUM 500/VITAMIN D PO           hydrocortisone 2.5 % cream    ANUSOL-HC    30 g    Place rectally 2 times daily    External hemorrhoids       LORazepam 0.5 MG tablet    ATIVAN    30 tablet    Take 1 tablet (0.5 mg) by mouth every 4 hours as needed (Anxiety, Nausea/Vomiting or Sleep)    Malignant neoplasm of anal canal (H)       pramipexole 0.125 MG tablet     MIRAPEX    60 tablet    Take one tablet 2-3 hours before bedtime, may increase to 2 tablets after one week if needed    Restless leg syndrome       prochlorperazine 10 MG tablet    COMPAZINE    30 tablet    Take 1 tablet (10 mg) by mouth every 6 hours as needed (Nausea/Vomiting)    Malignant neoplasm of anal canal (H)       TYLENOL EXTRA STRENGTH PO      1 TABLET EVERY 4 HOURS AS NEEDED        vitamin B complex with vitamin C Tabs tablet      Take 1 tablet by mouth daily

## 2018-02-21 NOTE — PROGRESS NOTES
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 Taylor presents to clinic today for her weekly on-treatment visit. She has tolerated her first week of therapy without incident and has no pressing concerns or complaints on examination. She continues to have severe pain with bowel movements which is grossly unchanged from her pre-treatment baseline. She otherwise denies any nausea/vomiting, fever/chills, chest pain or dyspnea and her remaining ROS are unremarkable.    ROS:   Nutrition  Diet: Patient's preference    GI  Nausea: None     Pain  Score (1-10): 0     Objective:   Weight: 50.3 kg    General: Alert, oriented and in no acute distress  Cardiac: Extremities are warm and well-perfused  Respiratory: No wheezing, stridor or respiratory distress  Skin: Mild perianal erythema with no evidence of desquamation    Treatment-related toxicities (CTCAE v4.0):  1. Dermatitis: Grade 1: Faint erythema or dry desquamation    Assessment:    Ms. 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 She is currently undergoing definitive chemoradiotherapy with curative intent.    Ms. Taylor is tolerating treatment well with the anticipated acute treatment-related toxicities.    Plan:   1. Continue radiotherapy  2. Recommended starting sitz baths throughout the remainder of the treatment duration  3. Nutrition referral placed last week to discuss dietary needs towards the end of her radiotherapy course (low residual/liquid diet)    Mosaiq chart and setup information reviewed  MVCT images  reviewed    Zaire Madison MD/PhD  Department of Radiation Oncology  [facility]

## 2018-02-22 ENCOUNTER — APPOINTMENT (OUTPATIENT)
Dept: RADIATION ONCOLOGY | Facility: CLINIC | Age: 78
End: 2018-02-22
Attending: RADIOLOGY
Payer: MEDICARE

## 2018-02-22 ENCOUNTER — CARE COORDINATION (OUTPATIENT)
Dept: ONCOLOGY | Facility: CLINIC | Age: 78
End: 2018-02-22

## 2018-02-22 DIAGNOSIS — C21.1 MALIGNANT NEOPLASM OF ANAL CANAL (H): Primary | ICD-10-CM

## 2018-02-22 PROCEDURE — 77386 ZZH IMRT TREATMENT DELIVERY, COMPLEX: CPT | Performed by: RADIOLOGY

## 2018-02-22 NOTE — PROGRESS NOTES
"Received message from coworker that pt had questions regarding her schedule.  Prior to RN calling her, pt arrived in clinic and completed form indicating reason for walk in was her understanding that he would be seeing her every Friday but ws scheduled for Saturday.  Indicated that Saturday would not work for her re: working (major source of income for her).  Spoke with pt in clinic (she was accompanied by friend).    1.  RN informed pt that Dr. Ray wrote that he wanted her to have fluids on Friday 2/34 and 3/2 then return to see him on 3/9.  She insisted that in appt he told her would see her every Friday.  She also went on to describe symptoms she wants to discuss with him.      2.  Pt described her mouth as being very sore.  She has ongoing mouth sores which are generally improving though 1 or 2 are still painful and blister: on lower lip and R corner of mouth.  Sore at L corner but did not look blister like.  Pt reports she had sores on tongue as well though it is also generally improving per pt.  She has been using salt & baking soda solution to swish and spit.  Also reports using vitamin E on lips which she attributes to helping sores on lips.    She also reports she had liquid diarrhea on Saturday, Sunday, and into Monday.  Started improving Monday evening and semi formed by Tuesday.    Sent message to Dr. Ray requesting adding pt to his schedule tomorrow, 2/23 and if wants labs prior.  Also requested ok for prescription of Magic Mouthwash at this time.    Per Dr. Ray,  \"She probably misunderstood. It was only for fluids and labs.  But I am okay seeing her tomorrow.   Yes, Please add CBC, CMP for appointment   Ok with calling in Magic mouthwash.\"    At this time, RN unable to get IV fluids scheduled for tomorrow after appt with Dr. Shen Ray.  Plan \"B\" for Saturday is hopefully no lab appt needed so she can arrive for fluids at 12n.  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  " "    Spoke with pt this afternoon reviewing time for lab and visit with Dr. Shen Ray tomorrow (Friday).  Told her unable to get infusion appt for Friday.  Reviewed plan \"B\".  Pt ok with plan \"B\" but still hoping for opening to occur so can get fluids tomorrow rather than Friday.  Also reviewed prescription for Magic Mouthwash was called into local [facility].  Pt appreciated call and assistance.  "

## 2018-02-23 ENCOUNTER — APPOINTMENT (OUTPATIENT)
Dept: RADIATION ONCOLOGY | Facility: CLINIC | Age: 78
End: 2018-02-23
Attending: RADIOLOGY
Payer: MEDICARE

## 2018-02-23 ENCOUNTER — ONCOLOGY VISIT (OUTPATIENT)
Dept: ONCOLOGY | Facility: CLINIC | Age: 78
End: 2018-02-23
Attending: INTERNAL MEDICINE
Payer: MEDICARE

## 2018-02-23 VITALS
OXYGEN SATURATION: 98 % | DIASTOLIC BLOOD PRESSURE: 73 MMHG | TEMPERATURE: 98.4 F | SYSTOLIC BLOOD PRESSURE: 121 MMHG | HEART RATE: 91 BPM | BODY MASS INDEX: 18.49 KG/M2 | HEIGHT: 64 IN | RESPIRATION RATE: 16 BRPM | WEIGHT: 108.3 LBS

## 2018-02-23 DIAGNOSIS — C21.1 MALIGNANT NEOPLASM OF ANAL CANAL (H): Primary | ICD-10-CM

## 2018-02-23 DIAGNOSIS — C21.1 MALIGNANT NEOPLASM OF ANAL CANAL (H): ICD-10-CM

## 2018-02-23 LAB
ALBUMIN SERPL-MCNC: 3.3 G/DL (ref 3.4–5)
ALP SERPL-CCNC: 84 U/L (ref 40–150)
ALT SERPL W P-5'-P-CCNC: 40 U/L (ref 0–50)
ANION GAP SERPL CALCULATED.3IONS-SCNC: 6 MMOL/L (ref 3–14)
AST SERPL W P-5'-P-CCNC: 33 U/L (ref 0–45)
BASOPHILS # BLD AUTO: 0 10E9/L (ref 0–0.2)
BASOPHILS NFR BLD AUTO: 0.4 %
BILIRUB SERPL-MCNC: 1.2 MG/DL (ref 0.2–1.3)
BUN SERPL-MCNC: 18 MG/DL (ref 7–30)
CALCIUM SERPL-MCNC: 8.9 MG/DL (ref 8.5–10.1)
CHLORIDE SERPL-SCNC: 106 MMOL/L (ref 94–109)
CO2 SERPL-SCNC: 28 MMOL/L (ref 20–32)
CREAT SERPL-MCNC: 0.67 MG/DL (ref 0.52–1.04)
DIFFERENTIAL METHOD BLD: ABNORMAL
EOSINOPHIL # BLD AUTO: 0.1 10E9/L (ref 0–0.7)
EOSINOPHIL NFR BLD AUTO: 5.7 %
ERYTHROCYTE [DISTWIDTH] IN BLOOD BY AUTOMATED COUNT: 12.4 % (ref 10–15)
GFR SERPL CREATININE-BSD FRML MDRD: 85 ML/MIN/1.7M2
GLUCOSE SERPL-MCNC: 105 MG/DL (ref 70–99)
HCT VFR BLD AUTO: 37.4 % (ref 35–47)
HGB BLD-MCNC: 12.2 G/DL (ref 11.7–15.7)
IMM GRANULOCYTES # BLD: 0 10E9/L (ref 0–0.4)
IMM GRANULOCYTES NFR BLD: 0.4 %
LYMPHOCYTES # BLD AUTO: 0.5 10E9/L (ref 0.8–5.3)
LYMPHOCYTES NFR BLD AUTO: 20.9 %
MCH RBC QN AUTO: 31 PG (ref 26.5–33)
MCHC RBC AUTO-ENTMCNC: 32.6 G/DL (ref 31.5–36.5)
MCV RBC AUTO: 95 FL (ref 78–100)
MONOCYTES # BLD AUTO: 0.2 10E9/L (ref 0–1.3)
MONOCYTES NFR BLD AUTO: 9.4 %
NEUTROPHILS # BLD AUTO: 1.5 10E9/L (ref 1.6–8.3)
NEUTROPHILS NFR BLD AUTO: 63.2 %
NRBC # BLD AUTO: 0 10*3/UL
NRBC BLD AUTO-RTO: 0 /100
PLATELET # BLD AUTO: 111 10E9/L (ref 150–450)
POTASSIUM SERPL-SCNC: 3.8 MMOL/L (ref 3.4–5.3)
PROT SERPL-MCNC: 6.1 G/DL (ref 6.8–8.8)
RBC # BLD AUTO: 3.93 10E12/L (ref 3.8–5.2)
SODIUM SERPL-SCNC: 140 MMOL/L (ref 133–144)
WBC # BLD AUTO: 2.4 10E9/L (ref 4–11)

## 2018-02-23 PROCEDURE — 99213 OFFICE O/P EST LOW 20 MIN: CPT | Mod: ZP | Performed by: INTERNAL MEDICINE

## 2018-02-23 PROCEDURE — 80053 COMPREHEN METABOLIC PANEL: CPT | Performed by: INTERNAL MEDICINE

## 2018-02-23 PROCEDURE — 36591 DRAW BLOOD OFF VENOUS DEVICE: CPT

## 2018-02-23 PROCEDURE — 77336 RADIATION PHYSICS CONSULT: CPT | Performed by: RADIOLOGY

## 2018-02-23 PROCEDURE — 25000128 H RX IP 250 OP 636: Mod: ZF | Performed by: INTERNAL MEDICINE

## 2018-02-23 PROCEDURE — G0463 HOSPITAL OUTPT CLINIC VISIT: HCPCS | Mod: ZF

## 2018-02-23 PROCEDURE — 85025 COMPLETE CBC W/AUTO DIFF WBC: CPT | Performed by: INTERNAL MEDICINE

## 2018-02-23 PROCEDURE — 77386 ZZH IMRT TREATMENT DELIVERY, COMPLEX: CPT | Performed by: RADIOLOGY

## 2018-02-23 RX ORDER — HEPARIN SODIUM (PORCINE) LOCK FLUSH IV SOLN 100 UNIT/ML 100 UNIT/ML
5 SOLUTION INTRAVENOUS ONCE
Status: COMPLETED | OUTPATIENT
Start: 2018-02-23 | End: 2018-02-23

## 2018-02-23 RX ADMIN — SODIUM CHLORIDE, PRESERVATIVE FREE 5 ML: 5 INJECTION INTRAVENOUS at 09:30

## 2018-02-23 ASSESSMENT — PAIN SCALES - GENERAL: PAINLEVEL: NO PAIN (0)

## 2018-02-23 NOTE — NURSING NOTE
"Oncology Rooming Note    February 23, 2018 9:58 AM   Elizabeth Taylor is a 77 year old female who presents for:    Chief Complaint   Patient presents with     Port Draw     Labs drawn from port by RN. Line flushed with saline and heparin. Vs taken and pt checked in for appt     Oncology Clinic Visit     F/U          Anal ca     Initial Vitals: /73 (BP Location: Right arm, Cuff Size: Adult Small)  Pulse 91  Temp 98.4  F (36.9  C) (Oral)  Resp 16  Ht 1.619 m (5' 3.74\")  Wt 49.1 kg (108 lb 4.8 oz)  SpO2 98%  BMI 18.74 kg/m2 Estimated body mass index is 18.74 kg/(m^2) as calculated from the following:    Height as of this encounter: 1.619 m (5' 3.74\").    Weight as of this encounter: 49.1 kg (108 lb 4.8 oz). Body surface area is 1.49 meters squared.  No Pain (0) Comment: Data Unavailable   No LMP recorded. Patient is postmenopausal.  Allergies reviewed: Yes  Medications reviewed: Yes    Medications: Medication refills not needed today.  Pharmacy name entered into [facility]: [facility] - [place], [place] - [address]    Clinical concerns: yes, Patient has had mouth sores x 1 week. She states she will  her magic mouthwash after appointment today. Dr Ray was notified.    10 minutes for nursing intake (face to face time)     DEBBY BARRERA LPN            "

## 2018-02-23 NOTE — MR AVS SNAPSHOT
After Visit Summary   2/23/2018    Elizabeth Taylor    MRN: [id number]           Patient Information     Date Of Birth          1940        Visit Information        Provider Department      2/23/2018 10:00 AM Shen Ray MD [facility]        Today's Diagnoses     Malignant neoplasm of anal canal (H)    -  1      Care Instructions          February 2018 Sunday Monday Tuesday Wednesday Thursday Friday Saturday                       1     2     UMP NEW    8:15 AM   (60 min.)   Shen Ray MD   [facility]     [facility] CONSULT   10:00 AM   (90 min.)   Zaire Madison MD   [facility] Oncology [facility]     [facility]ONIC LAB DRAW    1:00 PM   (15 min.)   [facility] DRAW   [facility] Lab Draw     [facility] TCT/SIM SUITE    2:00 PM   (60 min.)   Zaire Madison MD   [facility] 3       4     5     6     7     8     [facility] TREATMENT PLAN VISIT    7:00 AM   (15 min.)   Zaire Madison MD   [facility]cology [facility]     Outpatient Visit    8:06 AM   [facility]     IR CHEST PORT PLACEMENT >5 YRS    8:15 AM   (75 min.)   [id number]   [facility]     INSERT PORT VASCULAR ACCESS    9:45 AM   Zaire Moreira PA-C   [facility] OR 9     10       11     12     [facility] EXTERNAL RADIATION TREATMT   11:00 AM   (30 min.)   [facility] RAD ONC SANIYA   Radiation Oncology Clinic     [facility] ON TREATMENT VISIT   11:30 AM   (15 min.)   Zaire Madison MD   Radiation Oncology Clinic     [facility] LAB DRAW    1:00 PM   (15 min.)   [facility] DRAW   [facility] Lab Draw     [facility] ONC INFUSION 120    1:30 PM   (120 min.)   UC ONCOLOGY INFUSION   [facility] 13     P EXTERNAL RADIATION TREATMT   10:30 AM   (15 min.)   [facility] RAD ONC SANIYA   [facility] 14     P EXTERNAL RADIATION TREATMT   10:30 AM   (15 min.)   [facility] RAD ONC SANIYA   [facility] 15     [facility]  EXTERNAL RADIATION TREATMT   10:30 AM   (15 min.)   [facility] RAD ONC SANIYA   [facility] Oncology [facility] 16     [facility] RETURN    9:15 AM   (30 min.)   Shen Ray MD   [facility] EXTERNAL RADIATION TREATMT   10:30 AM   (15 min.)   [facility] RAD ONC SANIYA   Radiation Oncology Clinic     [facility] ONC INFUSION 60    3:30 PM   (60 min.)   UC ONCOLOGY INFUSION   [facility] 17       18     19     [facility] EXTERNAL RADIATION TREATMT    8:30 AM   (15 min.)   P RAD ONC SANIYA   [facility] 20     [facility] EXTERNAL RADIATION TREATMT    8:30 AM   (15 min.)   [facility] RAD ONC SANIYA   [facility] 21     P TCT/SIM SUITE    9:30 AM   (60 min.)   Zaire Madison MD   [facility]cology Clinic     [facility] EXTERNAL RADIATION TREATMT   10:30 AM   (15 min.)   [facility] RAD ONC SANIYA   Radiation Oncology Clinic     [facility] ON TREATMENT VISIT   10:45 AM   (15 min.)   Zaire Madison MD   [facility] 22     [facility] EXTERNAL RADIATION TREATMT    8:30 AM   (15 min.)   [facility] RAD ONC ASNIYA   [facility] 23     P EXTERNAL RADIATION TREATMT    8:30 AM   (15 min.)   [facility] RAD ONC SANIYA   Radiation Oncology Clinic     [facility] LAB DRAW    9:15 AM   (15 min.)   [facility] DRAW   [facility] Lab Draw     [facility] RETURN    9:45 AM   (30 min.)   Shen Ray MD   [facility] 24     P ONC INFUSION 120   12:00 PM   (120 min.)    ONCOLOGY INFUSION   [facility]   25     26     CONSULT HOD    8:00 AM   (60 min.)   June Alonso RD   [facility] [place], [facility] Services     P EXTERNAL RADIATION TREATMT    9:00 AM   (15 min.)   [facility] RAD ONC SANIYA   Radiation Oncology Clinic     [facility] ON TREATMENT VISIT    9:15 AM   (15 min.)   Zaire Madison MD   [facility] 27     [facility] EXTERNAL RADIATION TREATMT    9:00 AM   (15 min.)   [facility] RAD ONC SANIYA   [facility] 28     P EXTERNAL RADIATION  TREATMT    9:00 AM   (15 min.)   [facility] RAD ONC SANIYA   [facility]                           March 2018 Sunday Monday Tuesday Wednesday Thursday Friday Saturday                       1     [facility] EXTERNAL RADIATION TREATMT    9:00 AM   (15 min.)   [facility] RAD ONC SANIYA   [facility] 2     [facility] EXTERNAL RADIATION TREATMT    9:00 AM   (15 min.)   P RAD ONC SANIYA   Rad[facility] Oncology Clinic     [facility] LAB DRAW    9:15 AM   (15 min.)   [facility] DRAW   [facility] Lab Draw     [facility] RETURN    9:45 AM   (30 min.)   Shen Ray MD   [facility]   10:30 AM   (120 min.)   UC ONCOLOGY INFUSION   [facility] 3       4     5     [facility] EXTERNAL RADIATION TREATMT    9:00 AM   (15 min.)   P RAD ONC SANIYA   Radiation Oncology Clinic     [facility] ON TREATMENT VISIT    9:15 AM   (15 min.)   Zaire Madison MD   [facility] 6     [facility] EXTERNAL RADIATION TREATMT    9:00 AM   (15 min.)   P RAD ONC SANIYA   [facility] 7     [facility] EXTERNAL RADIATION TREATMT    9:00 AM   (15 min.)   P RAD ONC SANIYA   [facility] 8     [facility] EXTERNAL RADIATION TREATMT    9:00 AM   (15 min.)   P RAD ONC SANIYA   [facility] 9     [facility] EXTERNAL RADIATION TREATMT    9:00 AM   (15 min.)   P RAD ONC SANIYA   Radiation Oncology Clinic     [facility] LAB DRAW   12:00 PM   (15 min.)   [facility] LAB DRAW   [facility] Lab Draw     [facility] RETURN   12:15 PM   (30 min.)   Shen Ray MD   [facility]     [facility] ONC INFUSION 120    1:00 PM   (120 min.)   UC ONCOLOGY INFUSION   [facility] 10       11     12     [facility] EXTERNAL RADIATION TREATMT    9:00 AM   (15 min.)   P RAD ONC SANIYA   [facility] 13     [facility] EXTERNAL RADIATION TREATMT    9:00 AM   (15 min.)   P RAD ONC SANIYA   [facility] 14     [facility] EXTERNAL RADIATION TREATMT    9:00 AM   (15  min.)   [facility] RAD ONC SANIYA   [facility]cology Clinic     [facility] ON TREATMENT VISIT    9:15 AM   (15 min.)   Zaire Madison MD   [facility] 15     [facility] EXTERNAL RADIATION TREATMT    9:00 AM   (15 min.)   UMP RAD ONC SANIYA   [facility] 16     [facility] EXTERNAL RADIATION TREATMT    9:00 AM   (15 min.)   [facility] RAD ONC SANIYA   [facility] 17       18     19     [facility] EXTERNAL RADIATION TREATMT    9:00 AM   (15 min.)   [facility] RAD ONC SANIYA   [facility]cology Clinic     [facility] ON TREATMENT VISIT    9:15 AM   (15 min.)   Zaire Madison MD   [facility] 20     [facility] EXTERNAL RADIATION TREATMT    9:00 AM   (15 min.)   [facility] RAD ONC SANIYA   [facility] 21     [facility] EXTERNAL RADIATION TREATMT    9:00 AM   (15 min.)   [facility] RAD ONC SANIYA   [facility] 22     [facility] EXTERNAL RADIATION TREATMT    9:00 AM   (15 min.)   [facility] RAD ONC SANIYA   [facility] 23     [facility] EXTERNAL RADIATION TREATMT    9:00 AM   (15 min.)   UMP RAD ONC SANIYA   [facility] 24       25     26     27     28     29     30     31 April 2018 Sunday Monday Tuesday Wednesday Thursday Friday Saturday   1     2     3     4     5     6     7       8     9     10     11     12     13     14       15     16     17     18     19     20     21       22     23     24     25     26     27     28       29     30                                               Follow-ups after your visit        Your next 10 appointments already scheduled     Feb 24, 2018 12:00 PM CST   Infusion 120 with UC ONCOLOGY INFUSION, UC 15 ATC   [facility] ([facility] [place])    [address] [place] [address]  [address] [place] [address] [phone]            Feb 26, 2018  8:00 AM CST   Consult HOD with June Cooper RD   [facility] [place], [facility] Services ([facility])    [address] [place] [address]  [facility] 84  [facility] [address]               Feb 26, 2018  9:00 AM CST   EXTERNAL RADIATION TREATMENT with [facility] RAD ONC SANIYA   Radiation Oncology Clinic ([facility])    [facility]  1st Floor  [address] [facility] [address] [address]   [phone]            Feb 26, 2018  9:15 AM CST   ON TREATMENT VISIT with Zaire Madison MD   [facility]cology Clinic ([facility])    [facility]  1st Floor  [address] [address]   [phone]            Feb 27, 2018  9:00 AM CST   EXTERNAL RADIATION TREATMENT with [facility] RAD ONC SANIYA   [facility] Oncology Clinic ([facility])    [facility]  1st Floor  [address] [address]   [phone]            Feb 28, 2018  9:00 AM CST   EXTERNAL RADIATION TREATMENT with [facility] RAD ONC SANIYA   [facility] Oncology Clinic ([facility])    [facility]  1st Floor  [address] [address]   [phone]            Mar 01, 2018  9:00 AM CST   EXTERNAL RADIATION TREATMENT with [facility] RAD ONC SANIYA   [facility] Oncology Clinic ([facility])    [facility]  1st Floor  [address] [address]   [phone]            Mar 02, 2018  9:00 AM CST   EXTERNAL RADIATION TREATMENT with [facility] RAD ONC SANIYA   Radiation Oncology Clinic ([facility])    [facility]  1st Floor  [address] [address]   [phone]            Mar 02, 2018 10:00 AM CST   (Arrive by 9:45 AM)   Return Visit with Shen Ray MD   [facility] ([facility])    [address]  [address]  [facility] [id number] [phone]              Future tests that were ordered for you today     Open Future Orders        Priority Expected Expires Ordered    CBC with platelets differential Routine  "3/2/2018 2/23/2019 2/23/2018    Comprehensive metabolic panel Routine 3/2/2018 2/23/2019 2/23/2018            Who to contact     If you have questions or need follow up information about today's clinic visit or your schedule please contact [facility] directly at [phone].  Normal or non-critical lab and imaging results will be communicated to you by MyChart, letter or phone within 4 business days after the clinic has received the results. If you do not hear from us within 7 days, please contact the clinic through AnonymAskhart or phone. If you have a critical or abnormal lab result, we will notify you by phone as soon as possible.  Submit refill requests through Netlog or call your pharmacy and they will forward the refill request to us. Please allow 3 business days for your refill to be completed.          Additional Information About Your Visit        AnonymAskhart Information     Netlog gives you secure access to your electronic health record. If you see a primary care provider, you can also send messages to your care team and make appointments. If you have questions, please call your primary care clinic.  If you do not have a primary care provider, please call [phone] and they will assist you.        Care EveryWhere ID     This is your Care EveryWhere ID. This could be used by other organizations to access your [facility] medical records  [id number]        Your Vitals Were     Pulse Temperature Respirations Height Pulse Oximetry BMI (Body Mass Index)    91 98.4  F (36.9  C) (Oral) 16 1.619 m (5' 3.74\") 98% 18.74 kg/m2       Blood Pressure from Last 3 Encounters:   02/23/18 121/73   02/16/18 121/77   02/16/18 131/82    Weight from Last 3 Encounters:   02/23/18 49.1 kg (108 lb 4.8 oz)   02/21/18 50.3 kg (111 lb)   02/16/18 50.9 kg (112 lb 3.4 oz)               Primary Care Provider Office Phone # Fax #    Baron Seth [phone]       [address] [facility] [address]      "   Equal Access to Services     [facility] : Hadii aad ku hadrayabebe Monaali, waanada luqadaha, qaybta kaalekleland gomes, cyndy catalan. So [facility] [phone].    ATENCIÓN: Si habla español, tiene a ghosh disposición servicios gratuitos de asistencia lingüística. Jessica al [phone].    We comply with applicable federal civil rights laws and [place] laws. We do not discriminate on the basis of race, color, national origin, age, disability, sex, sexual orientation, or gender identity.            Thank you!     Thank you for choosing [facility]  for your care. Our goal is always to provide you with excellent care. Hearing back from our patients is one way we can continue to improve our services. Please take a few minutes to complete the written survey that you may receive in the mail after your visit with us. Thank you!             Your Updated Medication List - Protect others around you: Learn how to safely use, store and throw away your medicines at www.disposemymeds.org.          This list is accurate as of 2/23/18 10:38 AM.  Always use your most recent med list.                   Brand Name Dispense Instructions for use Diagnosis    CALCIUM 500/VITAMIN D PO           hydrocortisone 2.5 % cream    ANUSOL-HC    30 g    Place rectally 2 times daily    External hemorrhoids       LORazepam 0.5 MG tablet    ATIVAN    30 tablet    Take 1 tablet (0.5 mg) by mouth every 4 hours as needed (Anxiety, Nausea/Vomiting or Sleep)    Malignant neoplasm of anal canal (H)       magic mouthwash suspension    ENTER INGREDIENTS IN COMMENTS    500 mL    Swish and spit 5-10 mL four times daily as needed    Malignant neoplasm of anal canal (H)       pramipexole 0.125 MG tablet    MIRAPEX    60 tablet    Take one tablet 2-3 hours before bedtime, may increase to 2 tablets after one week if needed    Restless leg syndrome       prochlorperazine 10 MG tablet    COMPAZINE    30 tablet    Take 1  tablet (10 mg) by mouth every 6 hours as needed (Nausea/Vomiting)    Malignant neoplasm of anal canal (H)       TYLENOL EXTRA STRENGTH PO      1 TABLET EVERY 4 HOURS AS NEEDED        vitamin B complex with vitamin C Tabs tablet      Take 1 tablet by mouth daily

## 2018-02-23 NOTE — LETTER
2/23/2018       RE: Elizabeth Taylor  [address]   [place] [address]     Dear Colleague,    Thank you for referring your patient, Elizabeth Taylor, to the [facility]. Please see a copy of my visit note below.      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 She underwent a colonoscopy in April 2017 which noted to have small anal fissure along with internal hemorrhoids. Symptoms continued and  she was referred to colorectal surgery for further evaluation. On exam she was found to have an 1.5 cm anal lesion on the left side along with left groin palpable adenopathy. Biopsy of anal mass came back positive for squamous cell carcinoma. Patient underwent staging workup with MRI pelvis and a PET scan- showed PET avid left anal canal mass along with small left inguinal FDG avid lymph nodes.     02/12/18- Started on concurrent chemoradiation with 5FU/Mitomycin    SUBJECTIVE   The patient is here for routine 1 week follow-up. Developed mouth sores one week ago and has has been having difficulty with eating. Also has had diarrhea which is getting better. Denies fever/ chills, abdominal pain, neuropathy, nausea vomiting or any other complaints        PAST MEDICAL HISTORY     Past Medical History:   Diagnosis Date     Endometriosis, site unspecified      Thyroiditis, unspecified     Thryoiditis-resolved         CURRENT OUTPATIENT MEDICATIONS     Current Outpatient Prescriptions   Medication Sig Dispense Refill     hydrocortisone (ANUSOL-HC) 2.5 % cream Place rectally 2 times daily 30  g 1     TYLENOL EXTRA STRENGTH OR 1 TABLET EVERY 4 HOURS AS NEEDED       magic mouthwash (ENTER INGREDIENTS IN COMMENTS) suspension Swish and spit 5-10 mL four times daily as needed (Patient not taking: Reported on 2/23/2018) 500 mL 1     LORazepam (ATIVAN) 0.5 MG tablet Take 1 tablet (0.5 mg) by mouth every 4 hours as needed (Anxiety, Nausea/Vomiting or Sleep) 30 tablet 1     prochlorperazine (COMPAZINE) 10 MG tablet Take 1 tablet (10 mg) by mouth every 6 hours as needed (Nausea/Vomiting) (Patient not taking: Reported on 2/23/2018) 30 tablet 1     vitamin B complex with vitamin C (VITAMIN  B COMPLEX) TABS tablet Take 1 tablet by mouth daily       Calcium Carbonate-Vitamin D (CALCIUM 500/VITAMIN D PO)        pramipexole (MIRAPEX) 0.125 MG tablet Take one tablet 2-3 hours before bedtime, may increase to 2 tablets after one week if needed (Patient not taking: Reported on 2/23/2018) 60 tablet 5        ALLERGIES     Allergies   Allergen Reactions     Darvon [Propoxyphene]      Had reaction in teen years     Penicillins      As a child     Ketoconazole Rash        REVIEW OF SYSTEMS   As above in the HPI, o/w complete 12-point ROS was negative.     PHYSICAL EXAM   B/P: 131/82, T: 98.5, P: 78, R: 16  SpO2 Readings from Last 4 Encounters:   02/23/18 98%   02/16/18 96%   02/16/18 99%   02/12/18 96%     Wt Readings from Last 3 Encounters:   02/23/18 49.1 kg (108 lb 4.8 oz)   02/21/18 50.3 kg (111 lb)   02/16/18 50.9 kg (112 lb 3.4 oz)     GEN: NAD  EYES:PERRLA  Mouth/ENT: Oropharynx is clear. Mouth sores  NECK: no cervical or supraclavicular lymphadenopathy  LUNGS: clear bilaterally  CV: regular, no murmurs, rubs, or gallops  ABDOMEN: soft, non-tender, non-distended, normal bowel sounds  EXT: warm, well perfused, no edema  NEURO: alert  SKIN: no rashes     LABORATORY AND IMAGING STUDIES     Lab Results   Component Value Date    WBC 2.4 02/23/2018     Lab Results   Component Value Date    RBC 3.93 02/23/2018     Lab Results    Component Value Date    HGB 12.2 02/23/2018     Lab Results   Component Value Date    HCT 37.4 02/23/2018     No components found for: MCT  Lab Results   Component Value Date    MCV 95 02/23/2018     Lab Results   Component Value Date    MCH 31.0 02/23/2018     Lab Results   Component Value Date    MCHC 32.6 02/23/2018     Lab Results   Component Value Date    RDW 12.4 02/23/2018     Lab Results   Component Value Date     02/23/2018     Recent Labs   Lab Test  02/23/18   0935  02/12/18   1318   NA  140  138   POTASSIUM  3.8  3.8   CHLORIDE  106  104   CO2  28  29   ANIONGAP  6  5   GLC  105*  125*   BUN  18  17   CR  0.67  0.60   ELISE  8.9  8.7          ASSESSMENT AND PLAN     77-year-old female with no significant past medical history who presented with complaints of bright red blood per rectum and was recently found to have an anal mass which on biopsy came back for positive for invasive squamous cell carcinoma. Staging workup showed a PET avid anal mass along with hypermetabolic left inguinal adenopathy.     - Squamous cell carcinoma Anal canal  T2N1- IIIA  Started on definitive chemoradiation with infusional FU 1000 mg/m2 on days 1 to 4 and 29 to 32, plus mitomycin 10 mg/m2 on days 1 and 29 (as tolerated), maximum 20 mg per dose- day 1 on 02/12/18  - Mouth sores  Secondary to chemotherapy. Recommended using Magic mouthwash prn    - Cytopenias  Secondary to chemotherapy    I will have the patient come back for infusion for IV fluids tomorrow   Return to clinic in 1 week for follow up with labs, IV fluids    Chart documentation with Dragon Voice recognition Software. Although reviewed after completion, some words and grammatical errors may remain.  Shen Ray MD  Attending Physician   Hematology/Medical Oncology

## 2018-02-23 NOTE — PROGRESS NOTES
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 She underwent a colonoscopy in April 2017 which noted to have small anal fissure along with internal hemorrhoids. Symptoms continued and  she was referred to colorectal surgery for further evaluation. On exam she was found to have an 1.5 cm anal lesion on the left side along with left groin palpable adenopathy. Biopsy of anal mass came back positive for squamous cell carcinoma. Patient underwent staging workup with MRI pelvis and a PET scan- showed PET avid left anal canal mass along with small left inguinal FDG avid lymph nodes.     02/12/18- Started on concurrent chemoradiation with 5FU/Mitomycin    SUBJECTIVE   The patient is here for routine 1 week follow-up. Developed mouth sores one week ago and has has been having difficulty with eating. Also has had diarrhea which is getting better. Denies fever/ chills, abdominal pain, neuropathy, nausea vomiting or any other complaints        PAST MEDICAL HISTORY     Past Medical History:   Diagnosis Date     Endometriosis, site unspecified      Thyroiditis, unspecified     Thryoiditis-resolved         CURRENT OUTPATIENT MEDICATIONS     Current Outpatient Prescriptions   Medication Sig Dispense Refill     hydrocortisone (ANUSOL-HC) 2.5 % cream Place rectally 2 times daily 30 g 1     TYLENOL EXTRA STRENGTH OR 1 TABLET EVERY 4 HOURS AS NEEDED       magic mouthwash (ENTER INGREDIENTS IN COMMENTS) suspension Swish and spit 5-10 mL four times daily as needed (Patient not taking: Reported on 2/23/2018) 500 mL 1     LORazepam (ATIVAN) 0.5  MG tablet Take 1 tablet (0.5 mg) by mouth every 4 hours as needed (Anxiety, Nausea/Vomiting or Sleep) 30 tablet 1     prochlorperazine (COMPAZINE) 10 MG tablet Take 1 tablet (10 mg) by mouth every 6 hours as needed (Nausea/Vomiting) (Patient not taking: Reported on 2/23/2018) 30 tablet 1     vitamin B complex with vitamin C (VITAMIN  B COMPLEX) TABS tablet Take 1 tablet by mouth daily       Calcium Carbonate-Vitamin D (CALCIUM 500/VITAMIN D PO)        pramipexole (MIRAPEX) 0.125 MG tablet Take one tablet 2-3 hours before bedtime, may increase to 2 tablets after one week if needed (Patient not taking: Reported on 2/23/2018) 60 tablet 5        ALLERGIES     Allergies   Allergen Reactions     Darvon [Propoxyphene]      Had reaction in teen years     Penicillins      As a child     Ketoconazole Rash        REVIEW OF SYSTEMS   As above in the HPI, o/w complete 12-point ROS was negative.     PHYSICAL EXAM   B/P: 131/82, T: 98.5, P: 78, R: 16  SpO2 Readings from Last 4 Encounters:   02/23/18 98%   02/16/18 96%   02/16/18 99%   02/12/18 96%     Wt Readings from Last 3 Encounters:   02/23/18 49.1 kg (108 lb 4.8 oz)   02/21/18 50.3 kg (111 lb)   02/16/18 50.9 kg (112 lb 3.4 oz)     GEN: NAD  EYES:PERRLA  Mouth/ENT: Oropharynx is clear. Mouth sores  NECK: no cervical or supraclavicular lymphadenopathy  LUNGS: clear bilaterally  CV: regular, no murmurs, rubs, or gallops  ABDOMEN: soft, non-tender, non-distended, normal bowel sounds  EXT: warm, well perfused, no edema  NEURO: alert  SKIN: no rashes     LABORATORY AND IMAGING STUDIES     Lab Results   Component Value Date    WBC 2.4 02/23/2018     Lab Results   Component Value Date    RBC 3.93 02/23/2018     Lab Results   Component Value Date    HGB 12.2 02/23/2018     Lab Results   Component Value Date    HCT 37.4 02/23/2018     No components found for: MCT  Lab Results   Component Value Date    MCV 95 02/23/2018     Lab Results   Component Value Date    MCH 31.0 02/23/2018      Lab Results   Component Value Date    MCHC 32.6 02/23/2018     Lab Results   Component Value Date    RDW 12.4 02/23/2018     Lab Results   Component Value Date     02/23/2018     Recent Labs   Lab Test  02/23/18   0935  02/12/18   1318   NA  140  138   POTASSIUM  3.8  3.8   CHLORIDE  106  104   CO2  28  29   ANIONGAP  6  5   GLC  105*  125*   BUN  18  17   CR  0.67  0.60   ELISE  8.9  8.7          ASSESSMENT AND PLAN     77-year-old female with no significant past medical history who presented with complaints of bright red blood per rectum and was recently found to have an anal mass which on biopsy came back for positive for invasive squamous cell carcinoma. Staging workup showed a PET avid anal mass along with hypermetabolic left inguinal adenopathy.     - Squamous cell carcinoma Anal canal  T2N1- IIIA  Started on definitive chemoradiation with infusional FU 1000 mg/m2 on days 1 to 4 and 29 to 32, plus mitomycin 10 mg/m2 on days 1 and 29 (as tolerated), maximum 20 mg per dose- day 1 on 02/12/18  - Mouth sores  Secondary to chemotherapy. Recommended using Magic mouthwash prn    - Cytopenias  Secondary to chemotherapy    I will have the patient come back for infusion for IV fluids tomorrow   Return to clinic in 1 week for follow up with labs, IV fluids    Chart documentation with Dragon Voice recognition Software. Although reviewed after completion, some words and grammatical errors may remain.  Shen Ray MD  Attending Physician   Hematology/Medical Oncology

## 2018-02-23 NOTE — PATIENT INSTRUCTIONS
February 2018 Sunday Monday Tuesday Wednesday Thursday Friday Saturday                       1     2     [facility] NEW    8:15 AM   (60 min.)   Shen Ray MD   [facility]     [facility] CONSULT   10:00 AM   (90 min.)   Zaire Madison MD   [facility]     [facility]    1:00 PM   (15 min.)   [facility]   [facility] Lab Draw     P TCT/SIM SUITE    2:00 PM   (60 min.)   Zaire Madison MD   [facility] 3       4     5     6     7     8     [facility] TREATMENT PLAN VISIT    7:00 AM   (15 min.)   Zaire Madison MD   [facility]     Outpatient Visit    8:06 AM   [facility]     IR CHEST PORT PLACEMENT >5 YRS    8:15 AM   (75 min.)   [id number]   [facility]     INSERT PORT VASCULAR ACCESS    9:45 AM   Zaire Moreira PA-C   [facility] OR 9     10       11     12     [facility] EXTERNAL RADIATION TREATMT   11:00 AM   (30 min.)   [facility]   [facility]     [facility] ON TREATMENT VISIT   11:30 AM   (15 min.)   Zaire Madison MD   [facility]     [facility] D[facility]    1:00 PM   (15 min.)    [facility] D[facility]   [facility] Lab Draw     P ONC INFUSION 120    1:30 PM   (120 min.)   [facility] ONCOLOGY INFUSION   [facility] 13     [facility] EXTERNAL RADIATION TREATMT   10:30 AM   (15 min.)   P RAD ONC [facility]   [facility] 14     [facility] EXTERNAL RADIATION TREATMT   10:30 AM   (15 min.)   [facility] ONC [facility]   [facility] 15     [facility] EXTERNAL RADIATION TREATMT   10:30 AM   (15 min.)   [facility] ON[facility]   [facility] 16     [facility] RETURN    9:15 AM   (30 min.)   Shen Ray MD   [facility]     [facility] EXTERNAL RADIATION TREATMT   10:30 AM   (15 min.)   P [facility]   [facility]     P ONC INFUSION 60    3:30 PM   (60 min.)   [facility] ONCOL[facility]Y INFUSION   [facility]  [facility] 17       18     19     [facility] EXTERNAL RADIATION TREATMT    8:30 AM   (15 min.)   P RAD ONC [facility]   [facility] 20     [facility] EXTERNAL RADIATION TREATMT    8:30 AM   (15 min.)   P RAD ONC [facility]   [facility] 21     [facility] TCT/SIM SUITE    9:30 AM   (60 min.)   Zaire Madison MD   [facility]     P EXTERNAL RADIATION TREATMT   10:30 AM   (15 min.)   [facility] RAD ONC [facility]   [facility]     [facility] ON TREATMENT VISIT   10:45 AM   (15 min.)   Zaire Madison MD   [facility] 22     [facility] EXTERNAL RADIATION TREATMT    8:30 AM   (15 min.)   P RAD ONC [facility]   [facility] 23     [facility] EXTERNAL RADIATION TREATMT    8:30 AM   (15 min.)   [facility] RAD ONC [facility]   [facility]     [facility]    9:15 AM   (15 min.)    [facility]   [facility] Lab Draw     [facility] RETURN    9:45 AM   (30 min.)   Shen Ray MD   [facility] 24     P ONC INFUSION 120   12:00 PM   (120 min.)    ONCOLOGY INFUSION   [facility]   25     26     CONSULT HOD    8:00 AM   (60 min.)   June Alonso RD   [facility] [place]     P EXTERNAL RADIATION TREATMT    9:00 AM   (15 min.)   [facility] RAD ONC SANIYA   [facility]     [facility] ON TREATMENT VISIT    9:15 AM   (15 min.)   Zaire Madison MD   [facility] 27     [facility] EXTERNAL RADIATION TREATMT    9:00 AM   (15 min.)   [facility] RAD ONC [facility]IYA   [facility] 28     [facility] EXTERNAL RADIATION TREATMT    9:00 AM   (15 min.)   P RAD ONC [facility]   [facility]                           March 2018 Sunday Monday Tuesday Wednesday Thursday Friday Saturday                       1     [facility] EXTERNAL RADIATION TREATMT    9:00 AM   (15 min.)   P RAD ONC [facility]   [facility] 2     [facility] EXTERNAL RADIATION TREATMT    9:00 AM   (15 min.)   P RAD ONC [facility]    [facility]     [facility] DRAW    9:15 AM   (15 min.)    [facility] DRAW   [facility] Lab Draw     [facility] RETURN    9:45 AM   (30 min.)   Shen Ray MD   [facility]   10:30 AM   (120 min.)   [facility] ONCOL[facility]Y INFUSION   [facility] 3       4     5     [facility] EXTERNAL RADIATION TREATMT    9:00 AM   (15 min.)   [facility] RAD ONC [facility]   [facility]     [facility] ON TREATMENT VISIT    9:15 AM   (15 min.)   Zaire Madison MD   [facility] 6     [facility] EXTERNAL RADIATION TREATMT    9:00 AM   (15 min.)   [facility] RAD ONC [facility]   [facility] 7     [facility] EXTERNAL RADIATION TREATMT    9:00 AM   (15 min.)   P RAD ONC [facility]   [facility] 8     [facility] EXTERNAL RADIATION TREATMT    9:00 AM   (15 min.)   P RAD ONC [facility]   [facility] 9     [facility] EXTERNAL RADIATION TREATMT    9:00 AM   (15 min.)   P RAD ONC [facility]   [facility]     [facility] DRAW   12:00 PM   (15 min.)    [facility] DRAW   [facility] Lab Draw     [facility] RETURN   12:15 PM   (30 min.)   Shen Ray MD   [facility]    1:00 PM   (120 min.)   [facility] ON[facility]Y INFUSION   [facility] 10       11     12     [facility] EXTERNAL RADIATION TREATMT    9:00 AM   (15 min.)   P RAD ONC [facility]   [facility] 13     [facility] EXTERNAL RADIATION TREATMT    9:00 AM   (15 min.)   P RAD ONC [facility]   [facility] 14     [facility] EXTERNAL RADIATION TREATMT    9:00 AM   (15 min.)   P RAD ONC [facility]   [facility]     [facility] ON TREATMENT VISIT    9:15 AM   (15 min.)   Zaire Madison MD   [facility] 15     [facility] EXTERNAL RADIATION TREATMT    9:00 AM   (15 min.)   P RAD ONC [facility]   [facility] 16     [facility] EXTERNAL RADIATION TREATMT    9:00 AM   (15 min.)   P RAD ONC [facility]   [facility] 17        18     19     [facility] EXTERNAL RADIATION TREATMT    9:00 AM   (15 min.)   [facility] RAD ONC SANIYA   [facility]     [facility] ON TREATMENT VISIT    9:15 AM   (15 min.)   Zaire Madison MD   [facility] 20     P EXTERNAL RADIATION TREATMT    9:00 AM   (15 min.)   [facility] RAD ONC SANIYA   [facility] 21     [facility] EXTERNAL RADIATION TREATMT    9:00 AM   (15 min.)   [facility] RAD ONC SANIYA   [facility] 22     [facility] EXTERNAL RADIATION TREATMT    9:00 AM   (15 min.)   [facility] RAD ONC SANIYA   [facility] 23     P EXTERNAL RADIATION TREATMT    9:00 AM   (15 min.)   [facility] RAD ONC SANIYA   [facility] 24       25     26     27     28     29     30     31 April 2018 Sunday Monday Tuesday Wednesday Thursday Friday Saturday   1     2     3     4     5     6     7       8     9     10     11     12     13     14       15     16     17     18     19     20     21       22     23     24     25     26     27     28       29     30

## 2018-02-23 NOTE — NURSING NOTE
"Chief Complaint   Patient presents with     Port Draw     Labs drawn from port by RN. Line flushed with saline and heparin. Vs taken and pt checked in for appt     Port accessed with 20g 3/4\" gripper needle by RN, labs collected, line flushed with saline and heparin.  Vitals taken. Pt checked in for appointment(s).    Lucia Clayton RN  "

## 2018-02-24 ENCOUNTER — INFUSION THERAPY VISIT (OUTPATIENT)
Dept: ONCOLOGY | Facility: CLINIC | Age: 78
End: 2018-02-24
Attending: INTERNAL MEDICINE
Payer: MEDICARE

## 2018-02-24 VITALS
RESPIRATION RATE: 18 BRPM | SYSTOLIC BLOOD PRESSURE: 133 MMHG | HEART RATE: 86 BPM | TEMPERATURE: 97.7 F | DIASTOLIC BLOOD PRESSURE: 83 MMHG | OXYGEN SATURATION: 99 %

## 2018-02-24 DIAGNOSIS — C21.1 MALIGNANT NEOPLASM OF ANAL CANAL (H): Primary | ICD-10-CM

## 2018-02-24 PROCEDURE — 96361 HYDRATE IV INFUSION ADD-ON: CPT

## 2018-02-24 PROCEDURE — 25000128 H RX IP 250 OP 636: Mod: ZF | Performed by: INTERNAL MEDICINE

## 2018-02-24 PROCEDURE — 96360 HYDRATION IV INFUSION INIT: CPT

## 2018-02-24 RX ORDER — HEPARIN SODIUM (PORCINE) LOCK FLUSH IV SOLN 100 UNIT/ML 100 UNIT/ML
5 SOLUTION INTRAVENOUS ONCE
Status: COMPLETED | OUTPATIENT
Start: 2018-02-24 | End: 2018-02-24

## 2018-02-24 RX ADMIN — SODIUM CHLORIDE 1000 ML: 9 INJECTION, SOLUTION INTRAVENOUS at 12:17

## 2018-02-24 RX ADMIN — SODIUM CHLORIDE, PRESERVATIVE FREE 5 ML: 5 INJECTION INTRAVENOUS at 13:48

## 2018-02-24 ASSESSMENT — PAIN SCALES - GENERAL: PAINLEVEL: NO PAIN (0)

## 2018-02-24 NOTE — MR AVS SNAPSHOT
After Visit Summary   2/24/2018    Elizabeth Taylor    MRN: [id number]           Patient Information     Date Of Birth          1940        Visit Information        Provider Department      2/24/2018 12:00 PM  15 ATC;  ONCOLOGY INFUSION Formerly [facility]        Today's Diagnoses     Malignant neoplasm of anal canal (H)    -  1      Care Instructions    Contact Numbers    [facility] Main Line: [phone]  [facility] Triage:  [phone]    Call triage with chills and/or temperature greater than or equal to 100.5, uncontrolled nausea/vomiting, diarrhea, constipation, dizziness, shortness of breath, chest pain, bleeding, unexplained bruising, or any new/concerning symptoms, questions/concerns.     If you are having any concerning symptoms or wish to speak to a provider before your next infusion visit, please call your care coordinator or triage to notify them so we can adequately serve you.       After Hours: [phone]    If after hours, weekends, or holidays, call main hospital  and ask for Oncology doctor on call.           February 2018 Sunday Monday Tuesday Wednesday Thursday Friday Saturday                       1     2     [facility] NEW    8:15 AM   (60 min.)   Shen Ray MD   [facility]     UMP CONSULT   10:00 AM   (90 min.)   Zaire Madison MD   Radiation Oncology Clinic     [facility] LAB DRAW    1:00 PM   (15 min.)    [facility] LAB DRAW   [facility] Lab Draw     [facility] TCT/SIM SUITE    2:00 PM   (60 min.)   Zarie Madison MD   Radiation Oncology Clinic 3       4     5     6     7     8     [facility] TREATMENT PLAN VISIT    7:00 AM   (15 min.)   Zaire Madison MD   Radiation Oncology Clinic     Outpatient Visit    8:06 AM   [facility]     IR CHEST PORT PLACEMENT >5 YRS    8:15 AM   (75 min.)   [id number]   [facility] ASC Imaging     INSERT PORT VASCULAR ACCESS    9:45 AM   Zaire Moreira  [place]    OR 9     10       11     12     UMP EXTERNAL RADIATION TREATMT   11:00 AM   (30 min.)   UMP RAD ONC SANIYA   Radiation Oncology Clinic     [facility] ON TREATMENT VISIT   11:30 AM   (15 min.)   Zaire Madison MD   Radiation Oncology Clinic     P [facility] LAB DRAW    1:00 PM   (15 min.)    [facility] LAB DRAW   [facility] Lab Draw     UMP ONC INFUSION 120    1:30 PM   (120 min.)   UC ONCOLOGY INFUSION   [facility] 13     UMP EXTERNAL RADIATION TREATMT   10:30 AM   (15 min.)   UMP RAD ONC SANIYA   [facility] 14     UMP EXTERNAL RADIATION TREATMT   10:30 AM   (15 min.)   UMP RAD ONC SANIYA   [facility] 15     UMP EXTERNAL RADIATION TREATMT   10:30 AM   (15 min.)   P RAD ONC SANIYA   [facility] Oncology Clinic 16     UMP RETURN    9:15 AM   (30 min.)   Shen Ray MD   [facility]     UMP EXTERNAL RADIATION TREATMT   10:30 AM   (15 min.)   P RAD ONC SANIYA   Radiation Oncology Clinic     UMP ONC INFUSION 60    3:30 PM   (60 min.)    ONCOLOGY INFUSION   [facility] 17       18     19     UMP EXTERNAL RADIATION TREATMT    8:30 AM   (15 min.)   P RAD ONC SANIYA   [facility] 20     UMP EXTERNAL RADIATION TREATMT    8:30 AM   (15 min.)   P RAD ONC SANIYA   [facility] 21     UMP TCT/SIM SUITE    9:30 AM   (60 min.)   Zaire Madison MD   Radiation Oncology Clinic     [facility] EXTERNAL RADIATION TREATMT   10:30 AM   (15 min.)   P RAD ONC SANIYA   Radiation Oncology Clinic     [facility] ON TREATMENT VISIT   10:45 AM   (15 min.)   Zaire Madison MD   [facility] 22     [facility] EXTERNAL RADIATION TREATMT    8:30 AM   (15 min.)   P RAD ONC SANIYA   [facility] 23     [facility] EXTERNAL RADIATION TREATMT    8:30 AM   (15 min.)   P RAD ONC SANIYA   Radiation Oncology Clinic     P [facility] LAB DRAW    9:15 AM   (15 min.)    [facility] LAB DRAW   [facility]  Lab Draw     UMP RETURN    9:45 AM   (30 min.)   Shen Ray MD   [facility] 24     UMP ONC INFUSION 120   12:00 PM   (120 min.)   UC ONCOLOGY INFUSION   [facility]   25     26     UMP EXTERNAL RADIATION TREATMT    9:00 AM   (15 min.)   P RAD ONC SANIYA   Radiation Oncology Clinic     UMP ON TREATMENT VISIT    9:15 AM   (15 min.)   Zaire Madison MD   [facility] 27     UMP EXTERNAL RADIATION TREATMT    9:00 AM   (15 min.)   P RAD ONC SANIYA   [facility] 28     UMP EXTERNAL RADIATION TREATMT    9:00 AM   (15 min.)   P RAD ONC SANIYA   Radiation Oncology Clinic                           March 2018 Sunday Monday Tuesday Wednesday Thursday Friday Saturday                       1     UMP EXTERNAL RADIATION TREATMT    9:00 AM   (15 min.)   P RAD ONC SANIYA   Rad[facility] Oncology Clinic 2     UMP EXTERNAL RADIATION TREATMT    9:00 AM   (15 min.)   P RAD ONC SANIYA   [facility]     [facility]    9[facility]15 AM   (15 min.)   [facility] LAB DRAW   [facility] Lab Draw     UMP RETURN    9:45 AM   (30 min.)   Shen Ray MD   [facility]   10:30 AM   (120 min.)   UC ONCOLOGY INFUSION   [facility] 3       4     5     UMP EXTERNAL RADIATION TREATMT    9:00 AM   (15 min.)   P RAD ONC SANIYA   Radiation Oncology Clinic     [facility] ON TREATMENT VISIT    9:15 AM   (15 min.)   Zaire Madison MD   [facility] 6     [facility] EXTERNAL RADIATION TREATMT    9:00 AM   (15 min.)   P RAD ONC SANIYA   [facility]cology [facility] 7     [facility] EXTERNAL RADIATION TREATMT    9:00 AM   (15 min.)   P RAD ONC SANIYA   [facility] Clinic 8     [facility] EXTERNAL RADIATION TREATMT    9:00 AM   (15 min.)   P RAD ONC SANIYA   Radiation Oncology Clinic     UMP NEW    9:15 AM   (60 min.)   June Alonso RD   [facility] 9     UMP EXTERNAL  RADIATION TREATMT    9:00 AM   (15 min.)   UMP RAD ONC SANIYA   Radiation Oncology Clinic     [facility] LAB DRAW   12:00 PM   (15 min.)    [facility]   [facility] Lab Draw     UMP RETURN   12:15 PM   (30 min.)   Shen Ray MD   [facility]    1:00 PM   (120 min.)   UC ONCOLOGY INFUSION   [facility] 10       11     12     UMP EXTERNAL RADIATION TREATMT    9:00 AM   (15 min.)   UMP RAD ONC SANIYA   [facility] Oncology Clinic 13     UMP EXTERNAL RADIATION TREATMT    9:00 AM   (15 min.)   UMP RAD ONC SANIYA   [facility] 14     UMP EXTERNAL RADIATION TREATMT    9:00 AM   (15 min.)   P RAD ONC SANIYA   Radiation Oncology Clinic     P ON TREATMENT VISIT    9:15 AM   (15 min.)   Zaire Madison MD   [facility] 15     [facility] EXTERNAL RADIATION TREATMT    9:00 AM   (15 min.)   UMP RAD ONC SANIYA   [facility] 16     UMP EXTERNAL RADIATION TREATMT    9:00 AM   (15 min.)   UMP RAD ONC SANIYA   [facility]cology Clinic 17       18     19     UMP EXTERNAL RADIATION TREATMT    9:00 AM   (15 min.)   P RAD ONC SANIYA   Radiation Oncology Clinic     [facility] ON TREATMENT VISIT    9:15 AM   (15 min.)   Zaire Madison MD   [facility] 20     UMP EXTERNAL RADIATION TREATMT    9:00 AM   (15 min.)   UMP RAD ONC SANIYA   [facility] Clinic 21     UMP EXTERNAL RADIATION TREATMT    9:00 AM   (15 min.)   UMP RAD ONC SANIYA   [facility]cology [facility] 22     UMP EXTERNAL RADIATION TREATMT    9:00 AM   (15 min.)   P RAD ONC SANIYA   [facility] Oncology Clinic 23     UMP EXTERNAL RADIATION TREATMT    9:00 AM   (15 min.)   P RAD ONC SANIYA   Rad[facility] Oncology Clinic 24       25     26     27     28     29     30     31               No results found for this or any previous visit (from the past 24 hour(s)).              Follow-ups after your visit        Your next 10 appointments already scheduled      Feb 26, 2018  9:00 AM CST   EXTERNAL RADIATION TREATMENT with UMP RAD ONC SANIYA   Radiation Oncology Clinic ([facility])    [facility]  1st Floor  [address] [facility] [address]   [phone]            Feb 26, 2018  9:15 AM CST   ON TREATMENT VISIT with Zaire Madison MD   Radiation Oncology Clinic ([facility])    [facility]  1st Floor  [address] [facility] [address]   [phone]            Feb 27, 2018  9:00 AM CST   EXTERNAL RADIATION TREATMENT with UMP RAD ONC SANIYA   Radiation Oncology Clinic ([facility])    [facility]  1st Floor  [address] [facility] [address]   [phone]            Feb 28, 2018  9:00 AM CST   EXTERNAL RADIATION TREATMENT with UMP RAD ONC SANIYA   Radiation Oncology Clinic ([facility])    [facility]  1st Floor  [address] [address]   [phone]            Mar 01, 2018  9:00 AM CST   EXTERNAL RADIATION TREATMENT with UMP RAD ONC SANIYA   Radiation Oncology Clinic ([facility])    [facility]  1st Floor  [address] [address]   [phone]            Mar 02, 2018  9:00 AM CST   EXTERNAL RADIATION TREATMENT with UMP RAD ONC SANIYA   Radiation Oncology Clinic ([facility])    [facility]  1st Floor  [address] [address]   [phone]            Mar 02, 2018  9:15 AM CST   [facility] Lab Draw with  [facility] LAB DRAW   [facility]    [address]  [address]  [facility] [address]   [phone]            Mar 02, 2018 10:00 AM CST   (Arrive by 9:45 AM)   Return Visit with Shen Ray MD   [facility]    [address] [place] [address]  [address]  [facility]  [id number] [phone]            Mar 02, 2018 10:30 AM CST   Infusion 120 with UC ONCOLOGY INFUSION, UC 23 ATC   [facility] ([facility])    [address]  [address]  [facility] [id number] [phone]              Future tests that were ordered for you today     Open Future Orders        Priority Expected Expires Ordered    CBC with platelets differential Routine 3/2/2018 2/23/2019 2/23/2018    Comprehensive metabolic panel Routine 3/2/2018 2/23/2019 2/23/2018            Who to contact     If you have questions or need follow up information about today's clinic visit or your schedule please contact [facility] directly at [phone].  Normal or non-critical lab and imaging results will be communicated to you by MyChart, letter or phone within 4 business days after the clinic has received the results. If you do not hear from us within 7 days, please contact the clinic through [facility] or phone. If you have a critical or abnormal lab result, we will notify you by phone as soon as possible.  Submit refill requests through [place]. com. cn or call your pharmacy and they will forward the refill request to us. Please allow 3 business days for your refill to be completed.          Additional Information About Your Visit        [facility] [facility] Information     [facility] [employer] gives you secure access to your electronic health record. If you see a primary care provider, you can also send messages to your care team and make appointments. If you have questions, please call your primary care clinic.  If you do not have a primary care provider, please call [phone] and they will assist you.        Care EveryWhere ID     This is your Care EveryWhere ID. This could be used by other organizations to access your [facility] medical records  [id number]        Your Vitals Were     Pulse Temperature Respirations Pulse Oximetry          86 97.7  F (36.5  C) (Oral) 18 99%          Blood Pressure from Last 3 Encounters:   02/24/18 133/83   02/23/18 121/73   02/16/18 121/77    Weight from Last 3 Encounters:   02/23/18 49.1 kg (108 lb 4.8 oz)   02/21/18 50.3 kg (111 lb)   02/16/18 50.9 kg (112 lb 3.4 oz)              Today, you had the following     No orders found for display       Primary Care Provider Office Phone # Fax #    Baron Seth [phone]       [address]  [facility] [address]        Equal Access to Services     [facility] : Hadii chelo Grubbs, wajovi ascencio, melida valentinemaleland gomes, cyndy walton . So [facility] [phone].    ATENCIÓN: Si habla español, tiene a ghosh disposición servicios gratuitos de asistencia lingüística. [facility] [phone].    We comply with applicable federal civil rights laws and [place] laws. We do not discriminate on the basis of race, color, national origin, age, disability, sex, sexual orientation, or gender identity.            Thank you!     Thank you for choosing [facility]CER CLINIC  for your care. Our goal is always to provide you with excellent care. Hearing back from our patients is one way we can continue to improve our services. Please take a few minutes to complete the written survey that you may receive in the mail after your visit with us. Thank you!             Your Updated Medication List - Protect others around you: Learn how to safely use, store and throw away your medicines at www.disposemymeds.org.          This list is accurate as of 2/24/18 12:33 PM.  Always use your most recent med list.                   Brand Name Dispense Instructions for use Diagnosis    CALCIUM 500/VITAMIN D PO           hydrocortisone 2.5 % cream    ANUSOL-HC    30 g    Place rectally 2 times daily    External hemorrhoids       LORazepam 0.5 MG tablet    ATIVAN    30 tablet    Take 1 tablet (0.5 mg) by mouth every 4 hours as needed (Anxiety, Nausea/Vomiting or Sleep)    Malignant neoplasm  of anal canal (H)       magic mouthwash suspension    ENTER INGREDIENTS IN COMMENTS    500 mL    Swish and spit 5-10 mL four times daily as needed    Malignant neoplasm of anal canal (H)       pramipexole 0.125 MG tablet    MIRAPEX    60 tablet    Take one tablet 2-3 hours before bedtime, may increase to 2 tablets after one week if needed    Restless leg syndrome       prochlorperazine 10 MG tablet    COMPAZINE    30 tablet    Take 1 tablet (10 mg) by mouth every 6 hours as needed (Nausea/Vomiting)    Malignant neoplasm of anal canal (H)       TYLENOL EXTRA STRENGTH PO      1 TABLET EVERY 4 HOURS AS NEEDED        vitamin B complex with vitamin C Tabs tablet      Take 1 tablet by mouth daily

## 2018-02-24 NOTE — PROGRESS NOTES
Infusion Nursing Note:  Elizabeth Taylor presents today for IV fluids.    Patient seen by provider today: No   present during visit today: Not Applicable.    Note: Pt here for infusion. Pt is impressed with how well she is doing during this treatment. Pt states she has a little blister in her rectum that is causing some discomfort/blood in stool, but MD aware. PT states she started using the Magic mouthwash recently and it has reduced her oral discomfort greatly.     Intravenous Access:  Implanted Port.    Post Infusion Assessment:  Patient tolerated infusion without incident.  Blood return noted pre and post infusion.  Site patent and intact, free from redness, edema or discomfort.  No evidence of extravasations.  Access discontinued per protocol.    Discharge Plan:   Patient declined prescription refills.  Discharge instructions reviewed with: Patient.  Patient and/or family verbalized understanding of discharge instructions and all questions answered.  Copy of AVS reviewed with patient and/or family.  Patient will return 3/2/18 for next appointment.  Patient discharged in stable condition accompanied by: self.  Departure Mode: Ambulatory.    JONATHAN FISHER RN

## 2018-02-24 NOTE — PATIENT INSTRUCTIONS
Contact Numbers    [facility] [place]: [phone]  [facility] T[place]:  [phone]    Call triage with chills and/or temperature greater than or equal to 100.5, uncontrolled nausea/vomiting, diarrhea, constipation, dizziness, shortness of breath, chest pain, bleeding, unexplained bruising, or any new/concerning symptoms, questions/concerns.     If you are having any concerning symptoms or wish to speak to a provider before your next infusion visit, please call your care coordinator or triage to notify them so we can adequately serve you.       After Hours: [phone]    If after hours, weekends, or holidays, call main hospital  and ask for Oncology doctor on call.           February 2018 Sunday Monday Tuesday Wednesday Thursday Friday Saturday                       1     2     [facility] NEW    8:15 AM   (60 min.)   Shen Ray MD   [facility]     [facility] CONSULT   10:00 AM   (90 min.)   Zaire Madison MD   Radiation Oncology Clinic     [facility] LAB DRAW    1:00 PM   (15 min.)    [facility] DRAW   [email or web address] Lab Draw     [facility] TCT/SIM SUITE    2:00 PM   (60 min.)   Zaire Madison MD   Radiation Oncology Clinic 3       4     5     6     7     8     [facility] TREATMENT PLAN VISIT    7:00 AM   (15 min.)   Zaire Madison MD   Radiation Oncology Clinic     Outpatient Visit    8:06 AM   [facility]     IR CHEST PORT PLACEMENT >5 YRS    8:15 AM   (75 min.)   [email or web address]   [facility] ASC Imaging     INSERT PORT VASCULAR ACCESS    9:45 AM   Zaire Moreira PA-C    OR 9     10       11     12     [facility] EXTERNAL RADIATION TREATMT   11:00 AM   (30 min.)   [facility] RAD ONC SANIYA   Radiation Oncology Clinic     [facility] ON TREATMENT VISIT   11:30 AM   (15 min.)   Zaire Madison MD   Radiation Oncology Clinic     [facility] LAB DRAW    1:00 PM   (15 min.)    [facility] LAB DRAW   [facility] [email or web address] Lab Draw     [facility] ONC INFUSION 120    1:30 PM    (120 min.)   UC ONCOLOGY INFUSION   [facility] 13     UMP EXTERNAL RADIATION TREATMT   10:30 AM   (15 min.)   UMP RAD ONC SANIYA   Rad[facility] Oncology Clinic 14     UMP EXTERNAL RADIATION TREATMT   10:30 AM   (15 min.)   P RAD ONC SANIYA   Rad[facility] Oncology Clinic 15     UMP EXTERNAL RADIATION TREATMT   10:30 AM   (15 min.)   P RAD ONC SANIYA   Rad[facility] Oncology Clinic 16     [facility] RETURN    9:15 AM   (30 min.)   Shen Ray MD   [facility]     UMP EXTERNAL RADIATION TREATMT   10:30 AM   (15 min.)   P RAD ONC SANIYA   Radiation Oncology Clinic     P ONC INFUSION 60    3:30 PM   (60 min.)   UC ONCOLOGY INFUSION   [facility] 17       18     19     UMP EXTERNAL RADIATION TREATMT    8:30 AM   (15 min.)   P RAD ONC SANIYA   [facility]cology [facility] 20     [facility] EXTERNAL RADIATION TREATMT    8:30 AM   (15 min.)   P RAD ONC SANIYA   [facility]cology [facility] 21     UMP TCT/SIM SUITE    9:30 AM   (60 min.)   Zaire Madison MD   Radiation Oncology Clinic     [facility] EXTERNAL RADIATION TREATMT   10:30 AM   (15 min.)   P RAD ONC SANIYA   Radiation Oncology Clinic     [facility] ON TREATMENT VISIT   10:45 AM   (15 min.)   Zaire Madison MD   [facility] 22     [facility] EXTERNAL RADIATION TREATMT    8:30 AM   (15 min.)   P RAD ONC SANIYA   [facility] 23     [facility] EXTERNAL RADIATION TREATMT    8:30 AM   (15 min.)   P RAD ONC SANIYA   Radiation Oncology Clinic     [facility] LAB DRAW    9:15 AM   (15 min.)    [facility] LAB DRAW   [facility] Lab Draw     [facility] RETURN    9:45 AM   (30 min.)   Shen Ray MD   [facility] 24     UMP ONC INFUSION 120   12:00 PM   (120 min.)   UC ONCOLOGY INFUSION   [facility]   25     26     UMP EXTERNAL RADIATION TREATMT    9:00 AM   (15 min.)   P RAD ONC SANIYA   Radiation Oncology Clinic     [facility] ON TREATMENT VISIT    9:15 AM   (15 min.)   Zaire Madison  MD Evangelist   [facility] 27     [facility] EXTERNAL RADIATION TREATMT    9:00 AM   (15 min.)   P RAD ONC SANIYA   [facility] 28     UMP EXTERNAL RADIATION TREATMT    9:00 AM   (15 min.)   P RAD ONC SANIYA   Radiation Oncology Clinic                           March 2018 Sunday Monday Tuesday Wednesday Thursday Friday Saturday                       1     UMP EXTERNAL RADIATION TREATMT    9:00 AM   (15 min.)   UMP RAD ONC SANIYA   [facility] 2     UMP EXTERNAL RADIATION TREATMT    9:00 AM   (15 min.)   P RAD ONC SANIYA   Radiation Oncology Clinic     [facility] LAB DRAW    9:15 AM   (15 min.)   UC [facility] LAB DRAW   [facility] Lab Draw     UMP RETURN    9:45 AM   (30 min.)   Shen Ray MD   [facility] ONC INFUSION 120   10:30 AM   (120 min.)   UC ONCOLOGY INFUSION   [facility] 3       4     5     UMP EXTERNAL RADIATION TREATMT    9:00 AM   (15 min.)   P RAD ONC SANIYA   Radiation Oncology Clinic     [facility] ON TREATMENT VISIT    9:15 AM   (15 min.)   Zaire Madison MD   [facility] 6     UMP EXTERNAL RADIATION TREATMT    9:00 AM   (15 min.)   P RAD ONC SANIYA   [facility] 7     UMP EXTERNAL RADIATION TREATMT    9:00 AM   (15 min.)   P RAD ONC SANIYA   [facility] 8     UMP EXTERNAL RADIATION TREATMT    9:00 AM   (15 min.)   P RAD ONC SANIYA   Radiation Oncology Clinic     UMP NEW    9:15 AM   (60 min.)   June Alonso, JUSTO   [facility] 9     UMP EXTERNAL RADIATION TREATMT    9:00 AM   (15 min.)   P RAD ONC SANIYA   Radiation Oncology Clinic     P [facility] LAB DRAW   12:00 PM   (15 min.)   UC [facility] LAB DRAW   [facility] Lab Draw     UMP RETURN   12:15 PM   (30 min.)   Shen Ray MD   [facility]    1:00 PM   (120 min.)   UC ONCOLOGY INFUSION   [facility] 10        11     12     UMP EXTERNAL RADIATION TREATMT    9:00 AM   (15 min.)   UMP RAD ONC SANIYA   [facility] 13     UMP EXTERNAL RADIATION TREATMT    9:00 AM   (15 min.)   UMP RAD ONC SANIYA   [facility] 14     UMP EXTERNAL RADIATION TREATMT    9:00 AM   (15 min.)   UMP RAD ONC SANIYA   Radiation Oncology Clinic     [facility] ON TREATMENT VISIT    9:15 AM   (15 min.)   Zaire Madison MD   [facility] 15     UMP EXTERNAL RADIATION TREATMT    9:00 AM   (15 min.)   P RAD ONC SANIYA   [facility] 16     UMP EXTERNAL RADIATION TREATMT    9:00 AM   (15 min.)   P RAD ONC SANIYA   [facility] 17       18     19     UMP EXTERNAL RADIATION TREATMT    9:00 AM   (15 min.)   P RAD ONC SANIYA   Radiation Oncology Clinic     [facility] ON TREATMENT VISIT    9:15 AM   (15 min.)   Zaire Madison MD   [facility] 20     UMP EXTERNAL RADIATION TREATMT    9:00 AM   (15 min.)   P RAD ONC SANIYA   [facility]cology Clinic 21     UMP EXTERNAL RADIATION TREATMT    9:00 AM   (15 min.)   P RAD ONC SANIYA   [facility] Oncology Clinic 22     UMP EXTERNAL RADIATION TREATMT    9:00 AM   (15 min.)   P RAD ONC SANIYA   [facility]cology [facility] 23     UMP EXTERNAL RADIATION TREATMT    9:00 AM   (15 min.)   P RAD ONC ASNIYA   [facility] 24       25     26     27     28     29     30     31               No results found for this or any previous visit (from the past 24 hour(s)).

## 2018-02-26 ENCOUNTER — APPOINTMENT (OUTPATIENT)
Dept: RADIATION ONCOLOGY | Facility: CLINIC | Age: 78
End: 2018-02-26
Attending: RADIOLOGY
Payer: MEDICARE

## 2018-02-26 VITALS — WEIGHT: 112.5 LBS | BODY MASS INDEX: 19.47 KG/M2

## 2018-02-26 DIAGNOSIS — C21.1 MALIGNANT NEOPLASM OF ANAL CANAL (H): Primary | ICD-10-CM

## 2018-02-26 PROCEDURE — 77386 ZZH IMRT TREATMENT DELIVERY, COMPLEX: CPT | Performed by: RADIOLOGY

## 2018-02-26 NOTE — PROGRESS NOTES
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 Taylor presents to clinic today for her weekly on-treatment visit. She continues to tolerate treatment well overall. She continues to have severe pain with bowel movements which is unchanged from her pre-treatment baseline. She is not avoiding eating or defecating because of the pain. She is taking daily baths which she finds soothing. Bowels movements are daily and soft in general. She had significant oral mucositis with her most recent chemotherapy but has found methods for managing this including using magic mouthwash.  She has started to notice some areas of mild erythema within the vital groin corresponding to her radiotherapy treatment. Her ROS are otherwise unremarkable.     ROS:   Nutrition Alteration  Diet Type: Patient's Preference  Nutrition Note: Good appetite    Skin  Skin Reaction: Mild erythema     ENT and Mouth Exam  Mucositis - Current: Generalized erythema     Gastrointestinal  Nausea: None  Diarrhea: None  GI Note: Trying to keep stools soft. Brief pain with BM     Psychosocial  Pyschosocial Note: Feeling better    Pain Assessment  0-10 Pain Scale: 0    Objective:   Wt 51 kg (112 lb 8 oz)  BMI 19.47 kg/m2    General: Alert, oriented and in no acute distress  Cardiac: Extremities are warm and well-perfused  Respiratory: No wheezing, stridor or respiratory distress  Skin: Mild erythema of the bilateral inguinal and perianal region with a small blister within the left inguinal region at the just below the belt line. No significant desquamation or  ulceration otherwise appreciated.    Treatment-related toxicities (CTCAE v4.0):  1. Dermatitis: Grade 1: Faint erythema or dry desquamation    Assessment:    Ms. 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 She is currently undergoing definitive chemoradiotherapy with curative intent and is tolerating treatment well with the anticipated acute radiation-induced toxicities.    Plan:   1. Continue radiotherapy  2. Sitz bath teaching performed today  3. Provided Aquaphor samples for application to the bilateral groin and recommended Proshield application to the perineum  4. Nutrition consultation scheduled for 3/8/2018    Mosaiq chart and setup information reviewed  MVCT images reviewed    Medication Review  Med list reviewed with patient?: Yes    The patient was seen and discussed with staff, Dr. Madison.    Juan Don MD  Resident, PGY-3  Department of Radiation Oncology  [facility]  [phone]      Attending addendum:   I saw and examined the patient with the resident and agree with the documented plan of care.    Zaire Madison MD/PhD  Dept of Radiation Oncology  [facility]

## 2018-02-26 NOTE — MR AVS SNAPSHOT
After Visit Summary   2/26/2018    Elizabeth Taylor    MRN: [id number]           Patient Information     Date Of Birth          1940        Visit Information        Provider Department      2/26/2018 9:15 AM Zaire Madison MD Radiation Oncology Clinic        Today's Diagnoses     Malignant neoplasm of anal canal (H)    -  1       Follow-ups after your visit        Your next 10 appointments already scheduled     Feb 27, 2018  9:00 AM CST   EXTERNAL RADIATION TREATMENT with [facility] RAD ONC SANIYA   Radiation Oncology Clinic ([facility])    [facility]  1st Floor  [address]  [facility] [address]   [phone]            Feb 28, 2018  9:00 AM CST   EXTERNAL RADIATION TREATMENT with [facility] RAD ONC SANIYA   Radiation Oncology Clinic ([facility])    [facility]  1st Floor  [address] [facility] [address]   [phone]            Mar 01, 2018  9:00 AM CST   EXTERNAL RADIATION TREATMENT with [facility] RAD ONC SANIYA   Radiation Oncology Clinic ([facility])    [facility]  1st Floor  [address] [facility] [address]   [phone]            Mar 02, 2018  9:00 AM CST   EXTERNAL RADIATION TREATMENT with [facility] RAD ONC SANIYA   Radiation Oncology Clinic ([facility])    [facility]  1st Floor  [address]  [facility] [address]   [phone]            Mar 02, 2018  9:15 AM CST   [facility] Draw with  [facility] DRAW   [facility]    [address]  [address]  [facility] [address]   [phone]            Mar 02, 2018 10:00 AM CST   (Arrive by 9:45 AM)   Return Visit with Shen Ray MD   [facility]    [address]  [address]  [facility] [address] [phone]            Mar 02, 2018 10:30 AM CST    Infusion 120 with  ONCOLOGY INFUSION, UC 23 ATC   [facility] ([facility])    [address]  [address]  [facility] [address] [phone]            Mar 05, 2018  9:00 AM CST   EXTERNAL RADIATION TREATMENT with [facility] RAD ONC SANIYA   Radiation Oncology Clinic ([facility])    [facility]  1st Floor  [address]  [facility] [id number] [phone]            Mar 05, 2018  9:15 AM CST   ON TREATMENT VISIT with Zaire Madison MD   Radiation Oncology Clinic ([facility])    [facility]  1st Floor  [address]  [facility] [id number] [phone]              Who to contact     Please call your clinic at [phone] to:    Ask questions about your health    Make or cancel appointments    Discuss your medicines    Learn about your test results    Speak to your doctor            Additional Information About Your Visit        [employer] Information     [employer] gives you secure access to your electronic health record. If you see a primary care provider, you can also send messages to your care team and make appointments. If you have questions, please call your primary care clinic.  If you do not have a primary care provider, please call [phone] and they will assist you.      [employer] is an electronic gateway that provides easy, online access to your medical records. With [employer], you can request a clinic appointment, read your test results, renew a prescription or communicate with your care team.     To access your existing account, please contact your [facility] or call [phone] for assistance.        Care EveryWhere ID     This is your Care EveryWhere ID. This could be used by other organizations to access your [place] medical records  [id number]        Your Vitals Were     BMI (Body Mass Index)                   19.47 kg/m2             Blood Pressure from Last 3 Encounters:   02/24/18 133/83   02/23/18 121/73   02/16/18 121/77    Weight from Last 3 Encounters:   02/26/18 51 kg (112 lb 8 oz)   02/23/18 49.1 kg (108 lb 4.8 oz)   02/21/18 50.3 kg (111 lb)              Today, you had the following     No orders found for display       Primary Care Provider Office Phone # Fax #    Baron Dread Seth [phone]       [address]  [facility] [address]        Equal Access to Services     [facility]: Hadii chelo ku hadasho Soomaali, waaxda luqadaha, qaybta kaalmada eliseo, cyndy walton . So [facility] [phone].    ATENCIÓN: Si habla español, tiene a ghosh disposición servicios gratuitos de asistencia lingüística. [facility] [phone].    We comply with applicable federal civil rights laws and [place] laws. We do not discriminate on the basis of race, color, national origin, age, disability, sex, sexual orientation, or gender identity.            Thank you!     Thank you for choosing RADIATION ONCOLOGY CLINIC  for your care. Our goal is always to provide you with excellent care. Hearing back from our patients is one way we can continue to improve our services. Please take a few minutes to complete the written survey that you may receive in the mail after your visit with us. Thank you!             Your Updated Medication List - Protect others around you: Learn how to safely use, store and throw away your medicines at www.disposemymeds.org.          This list is accurate as of 2/26/18 11:15 AM.  Always use your most recent med list.                   Brand Name Dispense Instructions for use Diagnosis    CALCIUM 500/VITAMIN D PO           hydrocortisone 2.5 % cream    ANUSOL-HC    30 g    Place rectally 2 times daily    External hemorrhoids       LORazepam 0.5 MG tablet    ATIVAN    30 tablet    Take 1 tablet (0.5 mg) by mouth every 4 hours as needed (Anxiety, Nausea/Vomiting or Sleep)    Malignant neoplasm of  anal canal (H)       magic mouthwash suspension    ENTER INGREDIENTS IN COMMENTS    500 mL    Swish and spit 5-10 mL four times daily as needed    Malignant neoplasm of anal canal (H)       pramipexole 0.125 MG tablet    MIRAPEX    60 tablet    Take one tablet 2-3 hours before bedtime, may increase to 2 tablets after one week if needed    Restless leg syndrome       prochlorperazine 10 MG tablet    COMPAZINE    30 tablet    Take 1 tablet (10 mg) by mouth every 6 hours as needed (Nausea/Vomiting)    Malignant neoplasm of anal canal (H)       TYLENOL EXTRA STRENGTH PO      1 TABLET EVERY 4 HOURS AS NEEDED        vitamin B complex with vitamin C Tabs tablet      Take 1 tablet by mouth daily

## 2018-02-27 ENCOUNTER — APPOINTMENT (OUTPATIENT)
Dept: RADIATION ONCOLOGY | Facility: CLINIC | Age: 78
End: 2018-02-27
Attending: RADIOLOGY
Payer: MEDICARE

## 2018-02-27 PROCEDURE — 77386 ZZH IMRT TREATMENT DELIVERY, COMPLEX: CPT | Performed by: RADIOLOGY

## 2018-02-28 ENCOUNTER — APPOINTMENT (OUTPATIENT)
Dept: RADIATION ONCOLOGY | Facility: CLINIC | Age: 78
End: 2018-02-28
Attending: RADIOLOGY
Payer: MEDICARE

## 2018-02-28 ENCOUNTER — CARE COORDINATION (OUTPATIENT)
Dept: ONCOLOGY | Facility: CLINIC | Age: 78
End: 2018-02-28

## 2018-02-28 PROCEDURE — 77386 ZZH IMRT TREATMENT DELIVERY, COMPLEX: CPT | Performed by: RADIOLOGY

## 2018-03-01 ENCOUNTER — APPOINTMENT (OUTPATIENT)
Dept: RADIATION ONCOLOGY | Facility: CLINIC | Age: 78
End: 2018-03-01
Attending: RADIOLOGY
Payer: MEDICARE

## 2018-03-01 PROCEDURE — 77386 ZZH IMRT TREATMENT DELIVERY, COMPLEX: CPT | Performed by: RADIOLOGY

## 2018-03-02 ENCOUNTER — INFUSION THERAPY VISIT (OUTPATIENT)
Dept: ONCOLOGY | Facility: CLINIC | Age: 78
End: 2018-03-02
Attending: INTERNAL MEDICINE
Payer: MEDICARE

## 2018-03-02 ENCOUNTER — APPOINTMENT (OUTPATIENT)
Dept: RADIATION ONCOLOGY | Facility: CLINIC | Age: 78
End: 2018-03-02
Attending: RADIOLOGY
Payer: MEDICARE

## 2018-03-02 ENCOUNTER — APPOINTMENT (OUTPATIENT)
Dept: LAB | Facility: CLINIC | Age: 78
End: 2018-03-02
Attending: INTERNAL MEDICINE
Payer: MEDICARE

## 2018-03-02 VITALS
DIASTOLIC BLOOD PRESSURE: 67 MMHG | WEIGHT: 110.8 LBS | RESPIRATION RATE: 18 BRPM | BODY MASS INDEX: 18.92 KG/M2 | SYSTOLIC BLOOD PRESSURE: 112 MMHG | TEMPERATURE: 98 F | HEIGHT: 64 IN | OXYGEN SATURATION: 99 % | HEART RATE: 80 BPM

## 2018-03-02 DIAGNOSIS — C21.1 MALIGNANT NEOPLASM OF ANAL CANAL (H): Primary | ICD-10-CM

## 2018-03-02 LAB
ALBUMIN SERPL-MCNC: 3.2 G/DL (ref 3.4–5)
ALP SERPL-CCNC: 79 U/L (ref 40–150)
ALT SERPL W P-5'-P-CCNC: 32 U/L (ref 0–50)
ANION GAP SERPL CALCULATED.3IONS-SCNC: 5 MMOL/L (ref 3–14)
AST SERPL W P-5'-P-CCNC: 23 U/L (ref 0–45)
BASOPHILS # BLD AUTO: 0 10E9/L (ref 0–0.2)
BASOPHILS NFR BLD AUTO: 0 %
BILIRUB SERPL-MCNC: 1 MG/DL (ref 0.2–1.3)
BUN SERPL-MCNC: 15 MG/DL (ref 7–30)
CALCIUM SERPL-MCNC: 8.7 MG/DL (ref 8.5–10.1)
CHLORIDE SERPL-SCNC: 106 MMOL/L (ref 94–109)
CO2 SERPL-SCNC: 28 MMOL/L (ref 20–32)
CREAT SERPL-MCNC: 0.68 MG/DL (ref 0.52–1.04)
DIFFERENTIAL METHOD BLD: ABNORMAL
EOSINOPHIL # BLD AUTO: 0.1 10E9/L (ref 0–0.7)
EOSINOPHIL NFR BLD AUTO: 4.5 %
ERYTHROCYTE [DISTWIDTH] IN BLOOD BY AUTOMATED COUNT: 13.2 % (ref 10–15)
GFR SERPL CREATININE-BSD FRML MDRD: 84 ML/MIN/1.7M2
GLUCOSE SERPL-MCNC: 89 MG/DL (ref 70–99)
HCT VFR BLD AUTO: 38 % (ref 35–47)
HGB BLD-MCNC: 12.6 G/DL (ref 11.7–15.7)
LYMPHOCYTES # BLD AUTO: 0.4 10E9/L (ref 0.8–5.3)
LYMPHOCYTES NFR BLD AUTO: 22.7 %
MCH RBC QN AUTO: 31.3 PG (ref 26.5–33)
MCHC RBC AUTO-ENTMCNC: 33.2 G/DL (ref 31.5–36.5)
MCV RBC AUTO: 95 FL (ref 78–100)
MONOCYTES # BLD AUTO: 0.5 10E9/L (ref 0–1.3)
MONOCYTES NFR BLD AUTO: 27.3 %
NEUTROPHILS # BLD AUTO: 0.8 10E9/L (ref 1.6–8.3)
NEUTROPHILS NFR BLD AUTO: 45.5 %
OVALOCYTES BLD QL SMEAR: SLIGHT
PLATELET # BLD AUTO: 92 10E9/L (ref 150–450)
PLATELET # BLD EST: ABNORMAL 10*3/UL
POIKILOCYTOSIS BLD QL SMEAR: SLIGHT
POTASSIUM SERPL-SCNC: 3.6 MMOL/L (ref 3.4–5.3)
PROT SERPL-MCNC: 5.8 G/DL (ref 6.8–8.8)
RBC # BLD AUTO: 4.02 10E12/L (ref 3.8–5.2)
SODIUM SERPL-SCNC: 139 MMOL/L (ref 133–144)
WBC # BLD AUTO: 1.7 10E9/L (ref 4–11)

## 2018-03-02 PROCEDURE — 77386 ZZH IMRT TREATMENT DELIVERY, COMPLEX: CPT | Performed by: RADIOLOGY

## 2018-03-02 PROCEDURE — 96360 HYDRATION IV INFUSION INIT: CPT

## 2018-03-02 PROCEDURE — 99214 OFFICE O/P EST MOD 30 MIN: CPT | Mod: ZP | Performed by: INTERNAL MEDICINE

## 2018-03-02 PROCEDURE — G0463 HOSPITAL OUTPT CLINIC VISIT: HCPCS | Mod: ZF,25

## 2018-03-02 PROCEDURE — 25000128 H RX IP 250 OP 636: Mod: ZF | Performed by: INTERNAL MEDICINE

## 2018-03-02 PROCEDURE — 80053 COMPREHEN METABOLIC PANEL: CPT | Performed by: INTERNAL MEDICINE

## 2018-03-02 PROCEDURE — 85025 COMPLETE CBC W/AUTO DIFF WBC: CPT | Performed by: INTERNAL MEDICINE

## 2018-03-02 PROCEDURE — 77336 RADIATION PHYSICS CONSULT: CPT | Performed by: RADIOLOGY

## 2018-03-02 RX ORDER — LIDOCAINE/PRILOCAINE 2.5 %-2.5%
CREAM (GRAM) TOPICAL PRN
Qty: 5 G | Refills: 1 | Status: ON HOLD | OUTPATIENT
Start: 2018-03-02 | End: 2018-04-04

## 2018-03-02 RX ORDER — HEPARIN SODIUM (PORCINE) LOCK FLUSH IV SOLN 100 UNIT/ML 100 UNIT/ML
5 SOLUTION INTRAVENOUS ONCE
Status: COMPLETED | OUTPATIENT
Start: 2018-03-02 | End: 2018-03-02

## 2018-03-02 RX ORDER — HEPARIN SODIUM (PORCINE) LOCK FLUSH IV SOLN 100 UNIT/ML 100 UNIT/ML
500 SOLUTION INTRAVENOUS ONCE
Status: COMPLETED | OUTPATIENT
Start: 2018-03-02 | End: 2018-03-02

## 2018-03-02 RX ADMIN — SODIUM CHLORIDE, PRESERVATIVE FREE 500 UNITS: 5 INJECTION INTRAVENOUS at 12:31

## 2018-03-02 RX ADMIN — SODIUM CHLORIDE 1000 ML: 9 INJECTION, SOLUTION INTRAVENOUS at 11:03

## 2018-03-02 RX ADMIN — SODIUM CHLORIDE, PRESERVATIVE FREE 5 ML: 5 INJECTION INTRAVENOUS at 09:46

## 2018-03-02 ASSESSMENT — PAIN SCALES - GENERAL: PAINLEVEL: NO PAIN (0)

## 2018-03-02 NOTE — PROGRESS NOTES
FOLLOW-UP VISIT NOTE    PATIENT NAME: Elizabeth Taylor MRN # [id number]  DATE OF VISIT: Mar 2, 2018 YOB: 1940    REFERRING PROVIDER: Emir Rosas MD  [address] [address]    CANCER TYPE: Squamous cell carcinoma Anal canal  STAGE: T2N1- IIIA  ECOG PS: 1    ONCOLOGY HISTORY:  77-year-old female who developed bright red blood per rectum started about 4 years ago and progressively got worse. She underwent a colonoscopy in April 2017 which noted to have small anal fissure along with internal hemorrhoids. Symptoms continued and  she was referred to colorectal surgery for further evaluation. On exam she was found to have an 1.5 cm anal lesion on the left side along with left groin palpable adenopathy. Biopsy of anal mass came back positive for squamous cell carcinoma. Patient underwent staging workup with MRI pelvis and a PET scan- showed PET avid left anal canal mass along with small left inguinal FDG avid lymph nodes.     02/12/18- Started on concurrent chemoradiation with 5FU/Mitomycin    SUBJECTIVE   The patient is here for routine 1 week follow-up. Denies fever/chills, nausea/vomiting, diarrhea, worsening fatigue. She is trying to keep up with fluid intake. Weight has been stable.         PAST MEDICAL HISTORY     Past Medical History:   Diagnosis Date     Endometriosis, site unspecified      Thyroiditis, unspecified     Thryoiditis-resolved         CURRENT OUTPATIENT MEDICATIONS     Current Outpatient Prescriptions   Medication Sig Dispense Refill     lidocaine-prilocaine (EMLA) cream Apply topically as needed for moderate pain 5 g 1     prochlorperazine (COMPAZINE) 10 MG tablet Take 1 tablet (10 mg) by mouth every 6 hours as needed (Nausea/Vomiting) 30 tablet 1     hydrocortisone (ANUSOL-HC) 2.5 % cream Place rectally 2 times daily 30 g 1     pramipexole (MIRAPEX) 0.125 MG tablet Take one tablet 2-3 hours before bedtime, may increase to 2 tablets after one  week if needed 60 tablet 5     TYLENOL EXTRA STRENGTH OR 1 TABLET EVERY 4 HOURS AS NEEDED       magic mouthwash (ENTER INGREDIENTS IN COMMENTS) suspension Swish and spit 5-10 mL four times daily as needed (Patient not taking: Reported on 3/2/2018) 500 mL 1     LORazepam (ATIVAN) 0.5 MG tablet Take 1 tablet (0.5 mg) by mouth every 4 hours as needed (Anxiety, Nausea/Vomiting or Sleep) (Patient not taking: Reported on 3/2/2018) 30 tablet 1     vitamin B complex with vitamin C (VITAMIN  B COMPLEX) TABS tablet Take 1 tablet by mouth daily       Calcium Carbonate-Vitamin D (CALCIUM 500/VITAMIN D PO)           ALLERGIES     Allergies   Allergen Reactions     Darvon [Propoxyphene]      Had reaction in teen years     Penicillins      As a child     Ketoconazole Rash        REVIEW OF SYSTEMS   As above in the HPI, o/w complete 12-point ROS was negative.     PHYSICAL EXAM   B/P: 131/82, T: 98.5, P: 78, R: 16  SpO2 Readings from Last 4 Encounters:   03/02/18 99%   02/24/18 99%   02/23/18 98%   02/16/18 96%     Wt Readings from Last 3 Encounters:   03/02/18 50.3 kg (110 lb 12.8 oz)   02/26/18 51 kg (112 lb 8 oz)   02/23/18 49.1 kg (108 lb 4.8 oz)     GEN: NAD  EYES:PERRLA  Mouth/ENT: Oropharynx is clear. Mouth sores  NECK: no cervical or supraclavicular lymphadenopathy  LUNGS: clear bilaterally  CV: regular, no murmurs, rubs, or gallops  ABDOMEN: soft, non-tender, non-distended, normal bowel sounds  EXT: warm, well perfused, no edema  NEURO: alert  SKIN: no rashes     LABORATORY AND IMAGING STUDIES     Lab Results   Component Value Date    WBC 1.7 03/02/2018     Lab Results   Component Value Date    RBC 4.02 03/02/2018     Lab Results   Component Value Date    HGB 12.6 03/02/2018     Lab Results   Component Value Date    HCT 38.0 03/02/2018     No components found for: MCT  Lab Results   Component Value Date    MCV 95 03/02/2018     Lab Results   Component Value Date    MCH 31.3 03/02/2018     Lab Results   Component Value Date     MCHC 33.2 03/02/2018     Lab Results   Component Value Date    RDW 13.2 03/02/2018     Lab Results   Component Value Date    PLT 92 03/02/2018       Recent Labs   Lab Test  03/02/18   0955  02/23/18   0935   NA  139  140   POTASSIUM  3.6  3.8   CHLORIDE  106  106   CO2  28  28   ANIONGAP  5  6   GLC  89  105*   BUN  15  18   CR  0.68  0.67   ELISE  8.7  8.9        ASSESSMENT AND PLAN     77-year-old female with no significant past medical history who presented with complaints of bright red blood per rectum and was recently found to have an anal mass which on biopsy came back for positive for invasive squamous cell carcinoma. Staging workup showed a PET avid anal mass along with hypermetabolic left inguinal adenopathy.     - Squamous cell carcinoma Anal canal  T2N1- IIIA  Started on definitive chemoradiation with infusional FU 1000 mg/m2 on days 1 to 4 and 29 to 32, plus mitomycin 10 mg/m2 on days 1 and 29 (as tolerated), maximum 20 mg per dose- day 1 on 02/12/18  Day 29 scheduled for 03/12/18 pending patient's tolerability and blood counts    - Mouth sores- Improved  Secondary to chemotherapy. Continue Magic mouthwash prn    - Cytopenias  Secondary to chemotherapy.    Return to clinic in 1 week for follow up with labs, IV fluids.     Chart documentation with Dragon Voice recognition Software. Although reviewed after completion, some words and grammatical errors may remain.  Shen Ray MD  Attending Physician   Hematology/Medical Oncology

## 2018-03-02 NOTE — PATIENT INSTRUCTIONS
Contact Numbers    [facility] Main Line: [phone]  [facility] Triage:  [phone]    Call triage with chills and/or temperature greater than or equal to 100.5, uncontrolled nausea/vomiting, diarrhea, constipation, dizziness, shortness of breath, chest pain, bleeding, unexplained bruising, or any new/concerning symptoms, questions/concerns.     If you are having any concerning symptoms or wish to speak to a provider before your next infusion visit, please call your care coordinator or triage to notify them so we can adequately serve you.       After Hours: [phone]    If after hours, weekends, or holidays, call main hospital  and ask for Oncology doctor on call.         March 2018 Sunday Monday Tuesday Wednesday Thursday Friday Saturday                       1     [facility] EXTERNAL RADIATION TREATMT    9:00 AM   (15 min.)   [facility] RAD ONC SANIYA   Radiation Oncology Clinic 2     [facility] EXTERNAL RADIATION TREATMT    9:00 AM   (15 min.)   [facility] RAD ONC SANIYA   Radiation Oncology Clinic     [facility]    9:15 AM   (15 min.)    [facility] DRAW   [facility] Lab Draw     [facility] RETURN    9:45 AM   (30 min.)   Shen Ray MD   [facility] ONC INFUSION 120   10:30 AM   (120 min.)   [facility] ON[facility]Y [facility]FUSION   [facility] 3       4     5     [facility] EXTERNAL RADIATION TREATMT    9:00 AM   (15 min.)   [facility] RAD ONC SANIYA   Radiation Oncology Clinic     [facility] ON TREATMENT VISIT    9:15 AM   (15 min.)   Zaire Madison MD   Radiation Oncology Clinic 6     [facility] EXTERNAL RADIATION TREATMT    9:00 AM   (15 min.)   [facility] RAD ONC SANIYA   Radiation Oncology Clinic 7     P EXTERNAL RADIATION TREATMT    9:00 AM   (15 min.)   [facility] RAD ONC SANIYA   Radiation Oncology Clinic 8     P EXTERNAL RADIATION TREATMT    9:00 AM   (15 min.)   [facility] RAD ONC SANIYA   Radiation Oncology Clinic     [facility] NEW    9:30 AM   (60 min.)   June Alonso RD   [facility] 9     [facility]  EXTERNAL RADIATION TREATMT    9:00 AM   (15 min.)   [facility] RAD ONC SANIYA   Radiation Oncology Clinic     [facility] DRAW   12:00 PM   (15 min.)    [facility] DRAW   [facility] Lab Draw     [facility] RETURN   12:15 PM   (30 min.)   Shen Ray MD   [facility]     [facility] ONC INFUSION 120    1:00 PM   (120 min.)   [facility] ON[facility]Y INFUSION   [facility] 10       11     12     [facility] EXTERNAL RADIATION TREATMT    9:00 AM   (15 min.)   [facility] RAD ONC SANIYA   Radiation Oncology Clinic 13     [facility] EXTERNAL RADIATION TREATMT    9:00 AM   (15 min.)   [facility] RAD ONC [facility]   Radiation Oncology Clinic 14     [facility] EXTERNAL RADIATION TREATMT    9:00 AM   (15 min.)   [facility] RAD ONC [facility]   Radiation Oncology Clinic 15     [facility] EXTERNAL RADIATION TREATMT    9:00 AM   (15 min.)   [facility] RAD ONC SANIYA   Radiation Oncology Clinic     [facility] ON TREATMENT VISIT    9:15 AM   (15 min.)   Zaire Madison MD   Radiation Oncology Clinic 16     [facility] EXTERNAL RADIATION TREATMT    9:00 AM   (15 min.)   [facility] RAD ONC SANIYA   Radiation Oncology Clinic 17       18     19     [facility] EXTERNAL RADIATION TREATMT    9:00 AM   (15 min.)   [facility] RAD ONC [facility]IYA   Radiation Oncology Clinic     [facility] ON TREATMENT VISIT    9:15 AM   (15 min.)   Zaire Madison MD   Radiation Oncology Clinic 20     [facility] EXTERNAL RADIATION TREATMT    9:00 AM   (15 min.)   [facility] RAD ONC SANIYA   Radiation Oncology Clinic 21     [facility] EXTERNAL RADIATION TREATMT    9:00 AM   (15 min.)   [facility] RAD ONC SANIYA   Radiation Oncology Clinic 22     [facility] EXTERNAL RADIATION TREATMT    9:00 AM   (15 min.)   [facility] RAD ONC SANIYA   Radiation Oncology Clinic 23     [facility] EXTERNAL RADIATION TREATMT    9:00 AM   (15 min.)   [facility] RAD ONC SANIYA   Radiation Oncology Clinic 24       25     26     27     28     29     30     31 April 2018 Sunday Monday Tuesday Wednesday Thursday Friday Saturday   1     2     3     4     5     6     7       8     9     10     11     12     13      14       15     16     17     18     19     20     21       22     23     24     25     26     27     28       29     30                                           Lab Results:  Recent Results (from the past 12 hour(s))   CBC with platelets differential    Collection Time: 03/02/18  9:55 AM   Result Value Ref Range    WBC 1.7 (L) 4.0 - 11.0 10e9/L    RBC Count 4.02 3.8 - 5.2 10e12/L    Hemoglobin 12.6 11.7 - 15.7 g/dL    Hematocrit 38.0 35.0 - 47.0 %    MCV 95 78 - 100 fl    MCH 31.3 26.5 - 33.0 pg    MCHC 33.2 31.5 - 36.5 g/dL    RDW 13.2 10.0 - 15.0 %    Platelet Count 92 (L) 150 - 450 10e9/L    Diff Method Manual Differential     % Neutrophils 45.5 %    % Lymphocytes 22.7 %    % Monocytes 27.3 %    % Eosinophils 4.5 %    % Basophils 0.0 %    Absolute Neutrophil 0.8 (L) 1.6 - 8.3 10e9/L    Absolute Lymphocytes 0.4 (L) 0.8 - 5.3 10e9/L    Absolute Monocytes 0.5 0.0 - 1.3 10e9/L    Absolute Eosinophils 0.1 0.0 - 0.7 10e9/L    Absolute Basophils 0.0 0.0 - 0.2 10e9/L    Poikilocytosis Slight     Ovalocytes Slight     Platelet Estimate Confirming automated cell count    Comprehensive metabolic panel    Collection Time: 03/02/18  9:55 AM   Result Value Ref Range    Sodium 139 133 - 144 mmol/L    Potassium 3.6 3.4 - 5.3 mmol/L    Chloride 106 94 - 109 mmol/L    Carbon Dioxide 28 20 - 32 mmol/L    Anion Gap 5 3 - 14 mmol/L    Glucose 89 70 - 99 mg/dL    Urea Nitrogen 15 7 - 30 mg/dL    Creatinine 0.68 0.52 - 1.04 mg/dL    GFR Estimate 84 >60 mL/min/1.7m2    GFR Estimate If Black >90 >60 mL/min/1.7m2    Calcium 8.7 8.5 - 10.1 mg/dL    Bilirubin Total 1.0 0.2 - 1.3 mg/dL    Albumin 3.2 (L) 3.4 - 5.0 g/dL    Protein Total 5.8 (L) 6.8 - 8.8 g/dL    Alkaline Phosphatase 79 40 - 150 U/L    ALT 32 0 - 50 U/L    AST 23 0 - 45 U/L

## 2018-03-02 NOTE — NURSING NOTE
Chief Complaint   Patient presents with     Port Draw     labs drawn from port by RN     Port accessed, labs drawn, flushed with NS & heparin.  Patient checked in for provider visit.  Natividad Oglesby RN

## 2018-03-02 NOTE — MR AVS SNAPSHOT
After Visit Summary   3/2/2018    Elizabeth Taylor    MRN: [id number]           Patient Information     Date Of Birth          1940        Visit Information        Provider Department      3/2/2018 10:00 AM Shen Ray MD [facility]        Today's Diagnoses     Malignant neoplasm of anal canal (H)    -  1       Follow-ups after your visit        Your next 10 appointments already scheduled     Mar 12, 2018  9:00 AM CDT   EXTERNAL RADIATION TREATMENT with UMP RAD ONC SANIYA   Radiation Oncology Clinic ([facility])    [facility]  1st Floor  [address] [facility] [address]   [phone]            Mar 13, 2018  7:30 AM CDT   [facility] Lab Draw with UC MASONIC LAB DRAW   [facility] Lab Draw ([facility])    [address]  [address]  [facility] [address]   [phone]            Mar 13, 2018  8:00 AM CDT   Infusion 120 with UC ONCOLOGY INFUSION, UC 13 ATC   [facility] ([facility])    [address]  [address]  [facility] [address]   [phone]            Mar 13, 2018  9:00 AM CDT   EXTERNAL RADIATION TREATMENT with UMP RAD ONC SANIYA   Radiation Oncology Clinic ([facility])    [facility]  1st Floor  [address] [facility] [address] [facility] [address]   [phone]            Mar 14, 2018  9:00 AM CDT   EXTERNAL RADIATION TREATMENT with UMP RAD ONC SANIYA   Radiation Oncology Clinic ([facility])    [facility]  1st Floor  [address] [facility] [address]   [phone]            Mar 15, 2018  9:00 AM CDT   EXTERNAL RADIATION TREATMENT with UMP RAD ONC SANIYA   Radiation Oncology Clinic ([facility])    [facility]  1st Floor  [address] [facility] [address]   [phone]            Mar 15, 2018  9:15 AM CDT   ON TREATMENT  VISIT with Zaire Madison MD   Radiation Oncology Clinic ([facility])    [facility]  1st Floor  [address]  [facility] [address]   [phone]            Mar 16, 2018  9:00 AM CDT   EXTERNAL RADIATION TREATMENT with [facility] RAD ONC SANIYA   Radiation Oncology Clinic ([facility])    [facility]  1st Floor  [address]  [facility] [address]   [phone]            Mar 17, 2018 10:00 AM CDT   Infusion 120 with UC ONCOLOGY INFUSION   [facility] ([facility])    [address]  [address]  [facility] [id number] [phone]              Who to contact     If you have questions or need follow up information about today's clinic visit or your schedule please contact [facility] directly at [phone].  Normal or non-critical lab and imaging results will be communicated to you by MyChart, letter or phone within 4 business days after the clinic has received the results. If you do not hear from us within 7 days, please contact the clinic through [employer] or phone. If you have a critical or abnormal lab result, we will notify you by phone as soon as possible.  Submit refill requests through [employer] or call your pharmacy and they will forward the refill request to us. Please allow 3 business days for your refill to be completed.          Additional Information About Your Visit        MyChart Information     [employer] gives you secure access to your electronic health record. If you see a primary care provider, you can also send messages to your care team and make appointments. If you have questions, please call your primary care clinic.  If you do not have a primary care provider, please call [phone] and they will assist you.        Care EveryWhere ID     This is your Care EveryWhere ID. This could be used by other organizations to access your [facility]  "medical records  [id number]        Your Vitals Were     Pulse Temperature Respirations Height Pulse Oximetry BMI (Body Mass Index)    80 98  F (36.7  C) 18 1.619 m (5' 3.74\") 99% 19.17 kg/m2       Blood Pressure from Last 3 Encounters:   No data found for BP    Weight from Last 3 Encounters:   No data found for Wt              Today, you had the following     No orders found for display         Today's Medication Changes          These changes are accurate as of 3/2/18 11:59 PM.  If you have any questions, ask your nurse or doctor.               Start taking these medicines.        Dose/Directions    lidocaine-prilocaine cream   Commonly known as:  EMLA   Used for:  Malignant neoplasm of anal canal (H)   Started by:  Shen Ray MD        Apply topically as needed for moderate pain   Quantity:  5 g   Refills:  1       loperamide 2 MG tablet   Commonly known as:  IMODIUM A-D   Used for:  Malignant neoplasm of anal canal (H)   Started by:  Shen Ray MD        Dose:  2 mg   Take 1 tablet (2 mg) by mouth 4 times daily as needed for diarrhea   Quantity:  20 tablet   Refills:  0            Where to get your medicines      These medications were sent to [facility] - [place], [place] - [address]  [address], [place] [address]     Phone:  [phone]     lidocaine-prilocaine cream         These medications were sent to [place], [place] - [address] [facility] [address]  [address] [facility] [address] [address]    Hours:  TRANSPLANT PHONE NUMBER [phone] Phone:  [phone]     loperamide 2 MG tablet                Primary Care Provider Office Phone # Fax #    Baron Seth [phone]       [address]  [facility] [address]        Equal Access to Services     IONA DALEY AH: Josué hermano Soomaali, waaxda luqadaha, qaybta kaalmada adeegyada, cyndy catalan. So wa " [phone].    ATENCIÓN: Si cristian sadler, tiene a ghosh disposición servicios gratuitos de asistencia lingüística. Jessica hoyt [phone].    We comply with applicable federal civil rights laws and [place] laws. We do not discriminate on the basis of race, color, national origin, age, disability, sex, sexual orientation, or gender identity.            Thank you!     Thank you for choosing [facility] CLIN[facility]  for your care. Our goal is always to provide you with excellent care. Hearing back from our patients is one way we can continue to improve our services. Please take a few minutes to complete the written survey that you may receive in the mail after your visit with us. Thank you!             Your Updated Medication List - Protect others around you: Learn how to safely use, store and throw away your medicines at www.disposemymeds.org.          This list is accurate as of 3/2/18 11:59 PM.  Always use your most recent med list.                   Brand Name Dispense Instructions for use Diagnosis    CALCIUM 500/VITAMIN D PO           hydrocortisone 2.5 % cream    ANUSOL-HC    30 g    Place rectally 2 times daily    External hemorrhoids       lidocaine-prilocaine cream    EMLA    5 g    Apply topically as needed for moderate pain    Malignant neoplasm of anal canal (H)       loperamide 2 MG tablet    IMODIUM A-D    20 tablet    Take 1 tablet (2 mg) by mouth 4 times daily as needed for diarrhea    Malignant neoplasm of anal canal (H)       LORazepam 0.5 MG tablet    ATIVAN    30 tablet    Take 1 tablet (0.5 mg) by mouth every 4 hours as needed (Anxiety, Nausea/Vomiting or Sleep)    Malignant neoplasm of anal canal (H)       magic mouthwash suspension    ENTER INGREDIENTS IN COMMENTS    500 mL    Swish and spit 5-10 mL four times daily as needed    Malignant neoplasm of anal canal (H)       pramipexole 0.125 MG tablet    MIRAPEX    60 tablet    Take one tablet 2-3 hours before bedtime, may increase to 2  tablets after one week if needed    Restless leg syndrome       prochlorperazine 10 MG tablet    COMPAZINE    30 tablet    Take 1 tablet (10 mg) by mouth every 6 hours as needed (Nausea/Vomiting)    Malignant neoplasm of anal canal (H)       TYLENOL EXTRA STRENGTH PO      1 TABLET EVERY 4 HOURS AS NEEDED        vitamin B complex with vitamin C Tabs tablet      Take 1 tablet by mouth daily

## 2018-03-02 NOTE — NURSING NOTE
"Oncology Rooming Note    March 2, 2018 10:06 AM   Elizabeth Taylor is a 77 year old female who presents for:    Chief Complaint   Patient presents with     Port Draw     labs drawn from port by RN     Oncology Clinic Visit     Return visit related to Anal Cancer     Initial Vitals: /67 (BP Location: Right arm, Patient Position: Sitting, Cuff Size: Adult Small)  Pulse 80  Temp 98  F (36.7  C)  Resp 18  Ht 1.619 m (5' 3.74\")  Wt 50.3 kg (110 lb 12.8 oz)  SpO2 99%  BMI 19.17 kg/m2 Estimated body mass index is 19.17 kg/(m^2) as calculated from the following:    Height as of this encounter: 1.619 m (5' 3.74\").    Weight as of this encounter: 50.3 kg (110 lb 12.8 oz). Body surface area is 1.5 meters squared.  No Pain (0) Comment: Data Unavailable   No LMP recorded. Patient is postmenopausal.  Allergies reviewed: Yes  Medications reviewed: Yes    Medications: Medication refills not needed today.  Pharmacy name entered into [facility]: [facility] - [place], [place] - [id number]    Clinical concerns: No new concerns. Provider was notified.    10 minutes for nursing intake (face to face time)     Heather Alvarenga LPN            "

## 2018-03-02 NOTE — LETTER
3/2/2018       RE: Elizabeth Taylor  [address]   [place] [address]     Dear Colleague,    Thank you for referring your patient, Elizabeth Taylor, to the [facility]. Please see a copy of my visit note below.      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 She underwent a colonoscopy in April 2017 which noted to have small anal fissure along with internal hemorrhoids. Symptoms continued and  she was referred to colorectal surgery for further evaluation. On exam she was found to have an 1.5 cm anal lesion on the left side along with left groin palpable adenopathy. Biopsy of anal mass came back positive for squamous cell carcinoma. Patient underwent staging workup with MRI pelvis and a PET scan- showed PET avid left anal canal mass along with small left inguinal FDG avid lymph nodes.     02/12/18- Started on concurrent chemoradiation with 5FU/Mitomycin    SUBJECTIVE   The patient is here for routine 1 week follow-up. Denies fever/chills, nausea/vomiting, diarrhea, worsening fatigue. She is trying to keep up with fluid intake. Weight has been stable.       PAST MEDICAL HISTORY     Past Medical History:   Diagnosis Date     Endometriosis, site unspecified      Thyroiditis, unspecified     Thryoiditis-resolved         CURRENT OUTPATIENT MEDICATIONS     Current Outpatient Prescriptions   Medication Sig Dispense Refill     lidocaine-prilocaine (EMLA) cream Apply topically as needed for moderate pain 5 g 1     prochlorperazine (COMPAZINE) 10 MG tablet Take 1 tablet (10 mg) by  mouth every 6 hours as needed (Nausea/Vomiting) 30 tablet 1     hydrocortisone (ANUSOL-HC) 2.5 % cream Place rectally 2 times daily 30 g 1     pramipexole (MIRAPEX) 0.125 MG tablet Take one tablet 2-3 hours before bedtime, may increase to 2 tablets after one week if needed 60 tablet 5     TYLENOL EXTRA STRENGTH OR 1 TABLET EVERY 4 HOURS AS NEEDED       magic mouthwash (ENTER INGREDIENTS IN COMMENTS) suspension Swish and spit 5-10 mL four times daily as needed (Patient not taking: Reported on 3/2/2018) 500 mL 1     LORazepam (ATIVAN) 0.5 MG tablet Take 1 tablet (0.5 mg) by mouth every 4 hours as needed (Anxiety, Nausea/Vomiting or Sleep) (Patient not taking: Reported on 3/2/2018) 30 tablet 1     vitamin B complex with vitamin C (VITAMIN  B COMPLEX) TABS tablet Take 1 tablet by mouth daily       Calcium Carbonate-Vitamin D (CALCIUM 500/VITAMIN D PO)           ALLERGIES     Allergies   Allergen Reactions     Darvon [Propoxyphene]      Had reaction in teen years     Penicillins      As a child     Ketoconazole Rash        REVIEW OF SYSTEMS   As above in the HPI, o/w complete 12-point ROS was negative.     PHYSICAL EXAM   B/P: 131/82, T: 98.5, P: 78, R: 16  SpO2 Readings from Last 4 Encounters:   03/02/18 99%   02/24/18 99%   02/23/18 98%   02/16/18 96%     Wt Readings from Last 3 Encounters:   03/02/18 50.3 kg (110 lb 12.8 oz)   02/26/18 51 kg (112 lb 8 oz)   02/23/18 49.1 kg (108 lb 4.8 oz)     GEN: NAD  EYES:PERRLA  Mouth/ENT: Oropharynx is clear. Mouth sores  NECK: no cervical or supraclavicular lymphadenopathy  LUNGS: clear bilaterally  CV: regular, no murmurs, rubs, or gallops  ABDOMEN: soft, non-tender, non-distended, normal bowel sounds  EXT: warm, well perfused, no edema  NEURO: alert  SKIN: no rashes     LABORATORY AND IMAGING STUDIES     Lab Results   Component Value Date    WBC 1.7 03/02/2018     Lab Results   Component Value Date    RBC 4.02 03/02/2018     Lab Results   Component Value Date    HGB 12.6  03/02/2018     Lab Results   Component Value Date    HCT 38.0 03/02/2018     No components found for: MCT  Lab Results   Component Value Date    MCV 95 03/02/2018     Lab Results   Component Value Date    MCH 31.3 03/02/2018     Lab Results   Component Value Date    MCHC 33.2 03/02/2018     Lab Results   Component Value Date    RDW 13.2 03/02/2018     Lab Results   Component Value Date    PLT 92 03/02/2018       Recent Labs   Lab Test  03/02/18   0955  02/23/18   0935   NA  139  140   POTASSIUM  3.6  3.8   CHLORIDE  106  106   CO2  28  28   ANIONGAP  5  6   GLC  89  105*   BUN  15  18   CR  0.68  0.67   ELISE  8.7  8.9        ASSESSMENT AND PLAN   77-year-old female with no significant past medical history who presented with complaints of bright red blood per rectum and was recently found to have an anal mass which on biopsy came back for positive for invasive squamous cell carcinoma. Staging workup showed a PET avid anal mass along with hypermetabolic left inguinal adenopathy.     - Squamous cell carcinoma Anal canal  T2N1- IIIA  Started on definitive chemoradiation with infusional FU 1000 mg/m2 on days 1 to 4 and 29 to 32, plus mitomycin 10 mg/m2 on days 1 and 29 (as tolerated), maximum 20 mg per dose- day 1 on 02/12/18  Day 29 scheduled for 03/12/18 pending patient's tolerability and blood counts    - Mouth sores- Improved  Secondary to chemotherapy. Continue Magic mouthwash prn  - Cytopenias  Secondary to chemotherapy.  Return to clinic in 1 week for follow up with labs, IV fluids.     Chart documentation with Dragon Voice recognition Software. Although reviewed after completion, some words and grammatical errors may remain.  Shen Ray MD  Attending Physician   Hematology/Medical Oncology

## 2018-03-02 NOTE — PROGRESS NOTES
Infusion Nursing Note:  Elizabeth Taylor presents today for IVF.    Patient seen by provider today: Yes: Dr. Ray   present during visit today: Not Applicable.    Note: N/A.    Intravenous Access:  Implanted Port.    Treatment Conditions:  Lab Results   Component Value Date    HGB 12.6 03/02/2018     Lab Results   Component Value Date    WBC 1.7 03/02/2018      Lab Results   Component Value Date    ANEU 0.8 03/02/2018     Lab Results   Component Value Date    PLT 92 03/02/2018      Lab Results   Component Value Date     03/02/2018                   Lab Results   Component Value Date    POTASSIUM 3.6 03/02/2018           No results found for: MAG         Lab Results   Component Value Date    CR 0.68 03/02/2018                   Lab Results   Component Value Date    ELISE 8.7 03/02/2018                Lab Results   Component Value Date    BILITOTAL 1.0 03/02/2018           Lab Results   Component Value Date    ALBUMIN 3.2 03/02/2018                    Lab Results   Component Value Date    ALT 32 03/02/2018           Lab Results   Component Value Date    AST 23 03/02/2018           Post Infusion Assessment:  Patient tolerated infusion without incident.  Blood return noted pre and post infusion.  Site patent and intact, free from redness, edema or discomfort.  No evidence of extravasations.  Access discontinued per protocol.    Discharge Plan:   Prescription refills given for emla.  Discharge instructions reviewed with: Patient.  Patient and/or family verbalized understanding of discharge instructions and all questions answered.  Copy of AVS reviewed with patient and/or family.  Patient will return 3/9/18 for next appointment.  Patient discharged in stable condition accompanied by: friend.  Departure Mode: Ambulatory.    Mercedes Garcia RN

## 2018-03-02 NOTE — MR AVS SNAPSHOT
After Visit Summary   3/2/2018    Elizabeth Taylor    MRN: [id number]           Patient Information     Date Of Birth          1940        Visit Information        Provider Department      3/2/2018 10:30 AM  23 ATC;  ONCOLOGY INFUSION [facility]        Today's Diagnoses     Malignant neoplasm of anal canal (H)    -  1      Care Instructions    Contact Numbers    [facility] Main Line: [phone]  [facility] Triage:  [phone]    Call triage with chills and/or temperature greater than or equal to 100.5, uncontrolled nausea/vomiting, diarrhea, constipation, dizziness, shortness of breath, chest pain, bleeding, unexplained bruising, or any new/concerning symptoms, questions/concerns.     If you are having any concerning symptoms or wish to speak to a provider before your next infusion visit, please call your care coordinator or triage to notify them so we can adequately serve you.       After Hours: [phone]    If after hours, weekends, or holidays, call main hospital  and ask for Oncology doctor on call.         March 2018 Sunday Monday Tuesday Wednesday Thursday Friday Saturday                       1     [facility] EXTERNAL RADIATION TREATMT    9:00 AM   (15 min.)   [facility] RAD ONC SANIYA   Radiation Oncology Clinic 2     [facility] EXTERNAL RADIATION TREATMT    9:00 AM   (15 min.)   [facility] RAD ONC SANIYA   Radiation Oncology Clinic     [facility] LAB DRAW    9:15 AM   (15 min.)   [facility]   [facility] Lab Draw     P RETURN    9:45 AM   (30 min.)   Shen Ray MD   [facility] ONC INFUSION 120   10:30 AM   (120 min.)   [facility] ONCOLOGY INFUSION   [facility] 3       4     5     [facility] EXTERNAL RADIATION TREATMT    9:00 AM   (15 min.)   [facility] RAD ONC SANIYA   Radiation Oncology Clinic     [facility] ON TREATMENT VISIT    9:15 AM   (15 min.)   Zaire Madison MD   Rad[facility] Oncology Clinic 6     [facility] EXTERNAL RADIATION TREATMT    9:00  AM   (15 min.)   [facility] RAD ONC SANIAY   Radiation Oncology Clinic 7     [facility] EXTERNAL RADIATION TREATMT    9:00 AM   (15 min.)   [facility] RAD ONC [facility]   Radiation Oncology Clinic 8     [facility] EXTERNAL RADIATION TREATMT    9:00 AM   (15 min.)   [facility] RAD ONC [facility]YA   Radiation Oncology Clinic     [facility] NEW    9:30 AM   (60 min.)   June Alonso RD   [facility] 9     [facility] EXTERNAL RADIATION TREATMT    9:00 AM   (15 min.)   [facility] RAD ONC SANIYA   Radiation Oncology Clinic     [facility] DRAW   12:00 PM   (15 min.)    [facility] LAB DRAW   [facility] Lab Draw     [facility] RETURN   12:15 PM   (30 min.)   Shen Ray MD   [facility]     [facility] ONC INFUSION 120    1:00 PM   (120 min.)    ONCOLOGY INFUSION   [facility] 10       11     12     [facility] EXTERNAL RADIATION TREATMT    9:00 AM   (15 min.)   [facility] RAD ONC SANIYA   Radiation Oncology Clinic 13     [facility] EXTERNAL RADIATION TREATMT    9:00 AM   (15 min.)   [facility] RAD ONC SANIYA   Radiation Oncology Clinic 14     [facility] EXTERNAL RADIATION TREATMT    9:00 AM   (15 min.)   [facility] RAD ONC SANIYA   Radiation Oncology Clinic 15     [facility] EXTERNAL RADIATION TREATMT    9:00 AM   (15 min.)   [facility] RAD ONC SANIYA   Radiation Oncology Clinic     [facility] ON TREATMENT VISIT    9:15 AM   (15 min.)   Zaire Madison MD   Radiation Oncology Clinic 16     [facility] EXTERNAL RADIATION TREATMT    9:00 AM   (15 min.)   [facility] RAD ONC SANIYA   Radiation Oncology Clinic 17       18     19     [facility] EXTERNAL RADIATION TREATMT    9:00 AM   (15 min.)   [facility] RAD ONC SANIYA   Radiation Oncology Clinic     [facility] ON TREATMENT VISIT    9:15 AM   (15 min.)   Zaire Madison MD   Radiation Oncology Clinic 20     [facility] EXTERNAL RADIATION TREATMT    9:00 AM   (15 min.)   [facility] RAD ONC SANIYA   Radiation Oncology Clinic 21     [facility] EXTERNAL RADIATION TREATMT    9:00 AM   (15 min.)   [facility] RAD ONC SANIYA   Radiation Oncology Clinic 22     [facility] EXTERNAL RADIATION TREATMT    9:00 AM   (15  min.)   [facility] RAD ONC SANIYA   Radiation Oncology Clinic 23     [facility] EXTERNAL RADIATION TREATMT    9:00 AM   (15 min.)   [facility] RAD ONC SANIYA   Radiation Oncology Clinic 24       25     26     27     28     29     30     31 April 2018 Sunday Monday Tuesday Wednesday Thursday Friday Saturday   1     2     3     4     5     6     7       8     9     10     11     12     13     14       15     16     17     18     19     20     21       22     23     24     25     26     27     28       29     30                                           Lab Results:  Recent Results (from the past 12 hour(s))   CBC with platelets differential    Collection Time: 03/02/18  9:55 AM   Result Value Ref Range    WBC 1.7 (L) 4.0 - 11.0 10e9/L    RBC Count 4.02 3.8 - 5.2 10e12/L    Hemoglobin 12.6 11.7 - 15.7 g/dL    Hematocrit 38.0 35.0 - 47.0 %    MCV 95 78 - 100 fl    MCH 31.3 26.5 - 33.0 pg    MCHC 33.2 31.5 - 36.5 g/dL    RDW 13.2 10.0 - 15.0 %    Platelet Count 92 (L) 150 - 450 10e9/L    Diff Method Manual Differential     % Neutrophils 45.5 %    % Lymphocytes 22.7 %    % Monocytes 27.3 %    % Eosinophils 4.5 %    % Basophils 0.0 %    Absolute Neutrophil 0.8 (L) 1.6 - 8.3 10e9/L    Absolute Lymphocytes 0.4 (L) 0.8 - 5.3 10e9/L    Absolute Monocytes 0.5 0.0 - 1.3 10e9/L    Absolute Eosinophils 0.1 0.0 - 0.7 10e9/L    Absolute Basophils 0.0 0.0 - 0.2 10e9/L    Poikilocytosis Slight     Ovalocytes Slight     Platelet Estimate Confirming automated cell count    Comprehensive metabolic panel    Collection Time: 03/02/18  9:55 AM   Result Value Ref Range    Sodium 139 133 - 144 mmol/L    Potassium 3.6 3.4 - 5.3 mmol/L    Chloride 106 94 - 109 mmol/L    Carbon Dioxide 28 20 - 32 mmol/L    Anion Gap 5 3 - 14 mmol/L    Glucose 89 70 - 99 mg/dL    Urea Nitrogen 15 7 - 30 mg/dL    Creatinine 0.68 0.52 - 1.04 mg/dL    GFR Estimate 84 >60 mL/min/1.7m2    GFR Estimate If Black >90 >60 mL/min/1.7m2    Calcium 8.7 8.5 - 10.1 mg/dL    Bilirubin  Total 1.0 0.2 - 1.3 mg/dL    Albumin 3.2 (L) 3.4 - 5.0 g/dL    Protein Total 5.8 (L) 6.8 - 8.8 g/dL    Alkaline Phosphatase 79 40 - 150 U/L    ALT 32 0 - 50 U/L    AST 23 0 - 45 U/L               Follow-ups after your visit        Your next 10 appointments already scheduled     Mar 05, 2018  9:00 AM CST   EXTERNAL RADIATION TREATMENT with P RAD ONC SANIYA   Radiation Oncology Clinic ([facility])    [facility]  1st Floor  [address] [address]   [phone]            Mar 05, 2018  9:15 AM CST   ON TREATMENT VISIT with Zaire Madison MD   Radiation Oncology Clinic ([facility])    [facility]  1st Floor  [address] [address]   [phone]            Mar 06, 2018  9:00 AM CST   EXTERNAL RADIATION TREATMENT with P RAD ONC SANIYA   Radiation Oncology Clinic ([facility])    [facility]  1st Floor  [address] [address]   [phone]            Mar 07, 2018  9:00 AM CST   EXTERNAL RADIATION TREATMENT with P RAD ONC SANIYA   Radiation Oncology Clinic ([facility])    [facility]  1st Floor  [address] [address]   [phone]            Mar 08, 2018  9:00 AM CST   EXTERNAL RADIATION TREATMENT with P RAD ONC SANIYA   Radiation Oncology Clinic ([facility])    [facility]  1st Floor  [address] [address]   [phone]            Mar 08, 2018  9:30 AM CST   New Patient Visit with June Cooper RD   [facility] ([facility])    [address]  [address]  [facility] [address]   [phone]            Mar 09, 2018  9:00 AM CST   EXTERNAL RADIATION TREATMENT with [facility] RAD ONC SANIYA   Radiation Oncology Clinic ([facility])    [facility]  1st  Floor  [address] [address]   [phone]            Mar 09, 2018 12:00 PM CST   [facility] Lab Draw with  [facility] LAB DRAW   [facility] Lab Draw ([facility])    [address]  [address]  [facility] [address]   [phone]            Mar 09, 2018 12:30 PM CST   (Arrive by 12:15 PM)   Return Visit with Shen Ray MD   [facility] ([facility])    [address] [place] [address]  [address]  [facility] [address]   [phone]            Mar 09, 2018  1:00 PM CST   Infusion 120 with  ONCOLOGY INFUSION   [facility] ([facility])    [address] [place] [address]  [address]  [facility] [address] [phone]              Future tests that were ordered for you today     Open Future Orders        Priority Expected Expires Ordered    CBC with platelets differential Routine 3/9/2018 3/2/2019 3/2/2018    Comprehensive metabolic panel Routine 3/9/2018 3/2/2019 3/2/2018            Who to contact     If you have questions or need follow up information about today's clinic visit or your schedule please contact [facility] directly at [phone].  Normal or non-critical lab and imaging results will be communicated to you by MyChart, letter or phone within 4 business days after the clinic has received the results. If you do not hear from us within 7 days, please contact the clinic through MyChart or phone. If you have a critical or abnormal lab result, we will notify you by phone as soon as possible.  Submit refill requests through [employer] or call your pharmacy and they will forward the refill request to us. Please allow 3 business days for your refill to be completed.          Additional Information About Your Visit        [employer] Information     [employer] gives you secure access to your electronic health record. If you see a primary care provider, you can  also send messages to your care team and make appointments. If you have questions, please call your primary care clinic.  If you do not have a primary care provider, please call [phone] and they will assist you.        Care EveryWhere ID     This is your Care EveryWhere ID. This could be used by other organizations to access your [facility] medical records  [id number]         Blood Pressure from Last 3 Encounters:   03/02/18 112/67   02/24/18 133/83   02/23/18 121/73    Weight from Last 3 Encounters:   03/02/18 50.3 kg (110 lb 12.8 oz)   02/26/18 51 kg (112 lb 8 oz)   02/23/18 49.1 kg (108 lb 4.8 oz)              We Performed the Following     CBC with platelets differential     Comprehensive metabolic panel          Today's Medication Changes          These changes are accurate as of 3/2/18 11:51 AM.  If you have any questions, ask your nurse or doctor.               Start taking these medicines.        Dose/Directions    lidocaine-prilocaine cream   Commonly known as:  EMLA   Used for:  Malignant neoplasm of anal canal (H)   Started by:  Shen Ray MD        Apply topically as needed for moderate pain   Quantity:  5 g   Refills:  1            Where to get your medicines      These medications were sent to [facility] - [place], [place] - [address]  [address], [place] [address]     Phone:  [phone]     lidocaine-prilocaine cream                Primary Care Provider Office Phone # Fax #    Baron Seth [phone]       [address] [place] [address]        Equal Access to Services     [facility]: Hadii chelo kramer hadasho Soomaali, waaxda luqadaha, qaybta kaalmada cyndy gomes. So [facility] [phone].    ATENCIÓN: Si habla español, tiene a ghosh disposición servicios gratuitos de asistencia lingüística. Llame al [phone].    We comply with applicable federal civil rights laws and [place] laws. We do not  discriminate on the basis of race, color, national origin, age, disability, sex, sexual orientation, or gender identity.            Thank you!     Thank you for choosing [facility]  for your care. Our goal is always to provide you with excellent care. Hearing back from our patients is one way we can continue to improve our services. Please take a few minutes to complete the written survey that you may receive in the mail after your visit with us. Thank you!             Your Updated Medication List - Protect others around you: Learn how to safely use, store and throw away your medicines at www.disposemymeds.org.          This list is accurate as of 3/2/18 11:51 AM.  Always use your most recent med list.                   Brand Name Dispense Instructions for use Diagnosis    CALCIUM 500/VITAMIN D PO           hydrocortisone 2.5 % cream    ANUSOL-HC    30 g    Place rectally 2 times daily    External hemorrhoids       lidocaine-prilocaine cream    EMLA    5 g    Apply topically as needed for moderate pain    Malignant neoplasm of anal canal (H)       LORazepam 0.5 MG tablet    ATIVAN    30 tablet    Take 1 tablet (0.5 mg) by mouth every 4 hours as needed (Anxiety, Nausea/Vomiting or Sleep)    Malignant neoplasm of anal canal (H)       magic mouthwash suspension    ENTER INGREDIENTS IN COMMENTS    500 mL    Swish and spit 5-10 mL four times daily as needed    Malignant neoplasm of anal canal (H)       pramipexole 0.125 MG tablet    MIRAPEX    60 tablet    Take one tablet 2-3 hours before bedtime, may increase to 2 tablets after one week if needed    Restless leg syndrome       prochlorperazine 10 MG tablet    COMPAZINE    30 tablet    Take 1 tablet (10 mg) by mouth every 6 hours as needed (Nausea/Vomiting)    Malignant neoplasm of anal canal (H)       TYLENOL EXTRA STRENGTH PO      1 TABLET EVERY 4 HOURS AS NEEDED        vitamin B complex with vitamin C Tabs tablet      Take 1 tablet by mouth daily

## 2018-03-04 ENCOUNTER — NURSE TRIAGE (OUTPATIENT)
Dept: NURSING | Facility: CLINIC | Age: 78
End: 2018-03-04

## 2018-03-04 RX ORDER — LOPERAMIDE HYDROCHLORIDE 2 MG/1
2 TABLET ORAL 4 TIMES DAILY PRN
Qty: 20 TABLET | Refills: 0 | Status: SHIPPED | OUTPATIENT
Start: 2018-03-04 | End: 2018-03-12

## 2018-03-04 NOTE — TELEPHONE ENCOUNTER
medicaiton ordered for diarrhea sent to closed pharamcy today and need sent to [facility]. Triage nurse called in verbal order per script in epic from Dr. Ray to Pharmacist at [facility] at [phone] and patient is aware script has been sent to this pharmacy for  today. No triage required.    Mary Ann Garcia, RN, BSN  [facility]

## 2018-03-04 NOTE — TELEPHONE ENCOUNTER
Patient calling reporting diarrhea started in past 3 days following chemotherapy. Reporting 6 stools per day initially. Denies having diarrhea yesterday. Patient woke with 2 large loose stools this morning. Afebrile. Radiation is scheduled for this morning. Patient is questioning going to radiation.    Paged Dr Ray through [facility] at 625 a.m. To call patient at Phone .     Crissy Senior RN  [facility]

## 2018-03-04 NOTE — TELEPHONE ENCOUNTER
Reason for Disposition    [1] Neutropenia known or suspected (e.g., recent cancer chemotherapy) AND [2] new onset of diarrhea    Additional Information    Negative: Shock suspected (e.g., cold/pale/clammy skin, too weak to stand, low BP, rapid pulse)    Negative: Difficult to awaken or acting confused  (e.g., disoriented, slurred speech)    Negative: Sounds like a life-threatening emergency to the triager    Negative: Vomiting also present and worse than the diarrhea    Negative: Fecal impaction suspected (with leakage of watery stool around impaction)    Negative: [1] SEVERE abdominal pain (e.g., excruciating) AND [2] present > 1 hour    Negative: [1] SEVERE abdominal pain AND [2] age > 60    Negative: [1] Blood in the stool AND [2] moderate or large amount of blood    Negative: Black or tarry bowel movements  (Exception: chronic-unchanged  black-grey bowel movements AND is taking iron pills or peptobismol)    Protocols used: CANCER - DIARRHEA-ADULT-

## 2018-03-05 ENCOUNTER — APPOINTMENT (OUTPATIENT)
Dept: RADIATION ONCOLOGY | Facility: CLINIC | Age: 78
End: 2018-03-05
Attending: RADIOLOGY
Payer: MEDICARE

## 2018-03-05 VITALS — WEIGHT: 109 LBS | BODY MASS INDEX: 18.86 KG/M2

## 2018-03-05 DIAGNOSIS — C21.1 MALIGNANT NEOPLASM OF ANAL CANAL (H): Primary | ICD-10-CM

## 2018-03-05 PROCEDURE — 77386 ZZH IMRT TREATMENT DELIVERY, COMPLEX: CPT | Performed by: RADIOLOGY

## 2018-03-05 NOTE — PROGRESS NOTES
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 Taylor presents to clinic today for her weekly on-treatment visit. She reports that she developed significant diarrhea starting Friday night with 6 episodes at that time. She called the nurse line on  and was prescribed loperamide by Dr. Ray. She says that she has not had any more diarrhea since taking loperamide. She has been aggressively hydrating and denies any lightheadedness or weakness. She denies any significant pain at this time and has only tiny spots of blood on the toilet paper when she wipes. She is getting good relief from sitz baths. She is using an OTC ointment on her perianal area that she was given previously and prefers this to proshield although cannot recall the name of this product. She is not currently using any moisturizer on the inguinal areas.     ROS:   Nutrition Alteration  Diet Type: Patient's Preference  Nutrition Note: Good appetite     Skin  Skin Reaction: Moderate erythema  Skin Note: Performing sitz baths     ENT and Mouth Exam  Mucositis - Current: Generalized erythema     Gastrointestinal  Nausea: None  Diarrhea: 6 bowel movements on Friday evening. Resolved with loperamide.     Pain Assessment  0-10 Pain Scale: 0    Objective:   Wt 49.4 kg (109 lb)  BMI 18.86 kg/m2     General: Alert, oriented and in no acute distress  Cardiac: Extremities are warm and well-perfused  Respiratory: No wheezing, stridor or respiratory distress  Skin: Moderate erythema of the bilateral inguinal and perianal region. No significant desquamation or  ulceration otherwise appreciated.    Treatment-related toxicities (CTCAE v4.0):  1. Nausea: Grade 1: Loss of appetite without alteration in eating habits  2. Diarrhea: Grade 2: Increase of 4-6 stools per day over baseline; moderate increase in ostomy output compared to baseline  3. Dermatitis: Grade 2: Moderate to brisk erythema; patchy moist desquamation, mostly confined to skin folds and creases; moderate erythema    Assessment:    Ms. 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 She is currently undergoing definitive chemoradiotherapy with curative intent and is tolerating treatment well with the anticipated acute radiation-induced toxicities.    Plan:   1. Continue radiotherapy  2. CT simulation today for adaptive radiotherapy planning  3. Continue loperamide as prescribed for diarrhea  4. Repeat labs on 3/9/2018  5. Nutrition consult on 3/8/2018  6. Recommended starting Aquaphor to the bilateral groins in addition to frequent application of Proshield or her OTC cream to the perianal region (will have her bring in her current skin care products next week for clarification)    [facility]os[facility] chart and setup information reviewed  MVCT images reviewed    Medication Review  Med list reviewed with patient?: Yes    The patient was seen and discussed with staff, Dr. Madison.    Juan Don MD  Resident, PGY-3  Department of Radiation Oncology  [facility]  [phone]      Attending addendum:   I saw and examined the patient with the resident and agree with the documented plan of care.    Zaire Madison MD/PhD  Dept of Radiation Oncology  [facility]

## 2018-03-05 NOTE — MR AVS SNAPSHOT
After Visit Summary   3/5/2018    Elizabeth Taylor    MRN: [id number]           Patient Information     Date Of Birth          1940        Visit Information        Provider Department      3/5/2018 9:15 AM Zaire Madison MD Radiation Oncology Clinic        Today's Diagnoses     Malignant neoplasm of anal canal (H)    -  1       Follow-ups after your visit        Your next 10 appointments already scheduled     Mar 06, 2018  9:00 AM CST   EXTERNAL RADIATION TREATMENT with [facility] RAD ONC SANIYA   Radiation Oncology Clinic ([facility])    [facility]  1st Floor  [address] [address]   [phone]            Mar 07, 2018  9:00 AM CST   EXTERNAL RADIATION TREATMENT with [facility] RAD ONC SANIYA   Radiation Oncology Clinic ([facility])    [facility]  1st Floor  [address] [address]   [phone]            Mar 08, 2018  9:00 AM CST   EXTERNAL RADIATION TREATMENT with [facility] RAD ONC SANIYA   Radiation Oncology Clinic ([facility])    [facility]  1st Floor  [address] [address]   [phone]            Mar 08, 2018  9:30 AM CST   New Patient Visit with June Cooper RD   [facility] ([facility])    [address]  [address]  [facility] [address]   [phone]            Mar 09, 2018  9:00 AM CST   EXTERNAL RADIATION TREATMENT with [facility] RAD ONC SANIYA   Radiation Oncology Clinic ([facility])    [facility]  1st Floor  [address] [address]   [phone]            Mar 09, 2018 12:00 PM CST   [facility] Lab Draw with  [facility] LAB DRAW   [facility]    [address]  [address]  [facility] [address]   [phone]            Mar 09, 2018 12:30 PM CST    (Arrive by 12:15 PM)   Return Visit with Shen Ray MD   [facility] ([facility])    [address]  [address]  [facility] [place] [address] [phone]            Mar 09, 2018  1:00 PM CST   Infusion 120 with UC ONCOLOGY INFUSION, UC 18 ATC   [facility] ([facility])    [address]  [address]  [facility] [place] [address] [phone]            Mar 12, 2018  9:00 AM CDT   EXTERNAL [facility]IATION [facility]REATMENT with [facility] RAD ONC SANIYA   Radiation Oncology Clinic ([facility])    [facility]  1st Floor  [address] [id number] [phone]              Who to contact     Please call your clinic at [phone] to:    Ask questions about your health    Make or cancel appointments    Discuss your medicines    Learn about your test results    Speak to your doctor            Additional Information About Your Visit        [employer] Information     [employer] gives you secure access to your electronic health record. If you see a primary care provider, you can also send messages to your care team and make appointments. If you have questions, please call your primary care clinic.  If you do not have a primary care provider, please call [phone] and they will assist you.      [employer] is an electronic gateway that provides easy, online access to your medical records. With [employer], you can request a clinic appointment, read your test results, renew a prescription or communicate with your care team.     To access your existing account, please contact your [facility] or call [phone] for assistance.        Care EveryWhere ID     This is your Care EveryWhere ID. This could be used by other organizations to access your [place] medical records  [id number]        Your Vitals Were     BMI (Body Mass Index)                   18.86 kg/m2             Blood Pressure from Last 3 Encounters:   03/02/18 112/67   02/24/18 133/83   02/23/18 121/73    Weight from Last 3 Encounters:   03/05/18 49.4 kg (109 lb)   03/02/18 50.3 kg (110 lb 12.8 oz)   02/26/18 51 kg (112 lb 8 oz)              Today, you had the following     No orders found for display       Primary Care Provider Office Phone # Fax #    Baron Dread Seth [phone]       [address]  [facility] [address]        Equal Access to Services     [facility]: Hadii aad ku hadasho Soomaali, waaxda luqadaha, qaybta kaalmada eliseo, cyndy walton . So [facility] [phone].    ATENCIÓN: Si habla español, tiene a ghosh disposición servicios gratuitos de asistencia lingüística. [facility] [phone].    We comply with applicable federal civil rights laws and [place] laws. We do not discriminate on the basis of race, color, national origin, age, disability, sex, sexual orientation, or gender identity.            Thank you!     Thank you for choosing RADIATION ONCOLOGY CLINIC  for your care. Our goal is always to provide you with excellent care. Hearing back from our patients is one way we can continue to improve our services. Please take a few minutes to complete the written survey that you may receive in the mail after your visit with us. Thank you!             Your Updated Medication List - Protect others around you: Learn how to safely use, store and throw away your medicines at www.disposemymeds.org.          This list is accurate as of 3/5/18 10:54 AM.  Always use your most recent med list.                   Brand Name Dispense Instructions for use Diagnosis    CALCIUM 500/VITAMIN D PO           hydrocortisone 2.5 % cream    ANUSOL-HC    30 g    Place rectally 2 times daily    External hemorrhoids       lidocaine-prilocaine cream    EMLA    5 g    Apply topically as needed for moderate pain    Malignant neoplasm of anal canal (H)       loperamide 2 MG tablet    IMODIUM A-D     20 tablet    Take 1 tablet (2 mg) by mouth 4 times daily as needed for diarrhea    Malignant neoplasm of anal canal (H)       LORazepam 0.5 MG tablet    ATIVAN    30 tablet    Take 1 tablet (0.5 mg) by mouth every 4 hours as needed (Anxiety, Nausea/Vomiting or Sleep)    Malignant neoplasm of anal canal (H)       magic mouthwash suspension    ENTER INGREDIENTS IN COMMENTS    500 mL    Swish and spit 5-10 mL four times daily as needed    Malignant neoplasm of anal canal (H)       pramipexole 0.125 MG tablet    MIRAPEX    60 tablet    Take one tablet 2-3 hours before bedtime, may increase to 2 tablets after one week if needed    Restless leg syndrome       prochlorperazine 10 MG tablet    COMPAZINE    30 tablet    Take 1 tablet (10 mg) by mouth every 6 hours as needed (Nausea/Vomiting)    Malignant neoplasm of anal canal (H)       TYLENOL EXTRA STRENGTH PO      1 TABLET EVERY 4 HOURS AS NEEDED        vitamin B complex with vitamin C Tabs tablet      Take 1 tablet by mouth daily

## 2018-03-06 ENCOUNTER — APPOINTMENT (OUTPATIENT)
Dept: RADIATION ONCOLOGY | Facility: CLINIC | Age: 78
End: 2018-03-06
Attending: RADIOLOGY
Payer: MEDICARE

## 2018-03-06 PROCEDURE — 77386 ZZH IMRT TREATMENT DELIVERY, COMPLEX: CPT | Performed by: RADIOLOGY

## 2018-03-07 ENCOUNTER — APPOINTMENT (OUTPATIENT)
Dept: RADIATION ONCOLOGY | Facility: CLINIC | Age: 78
End: 2018-03-07
Attending: RADIOLOGY
Payer: MEDICARE

## 2018-03-07 ENCOUNTER — HOSPITAL ENCOUNTER (OUTPATIENT)
Dept: CT IMAGING | Facility: CLINIC | Age: 78
Discharge: HOME | End: 2018-03-07
Attending: RADIOLOGY | Admitting: RADIOLOGY
Payer: MEDICARE

## 2018-03-07 DIAGNOSIS — C21.1 MALIGNANT NEOPLASM OF ANAL CANAL (H): ICD-10-CM

## 2018-03-07 PROCEDURE — 74177 CT ABD & PELVIS W/CONTRAST: CPT

## 2018-03-07 PROCEDURE — 25000128 H RX IP 250 OP 636: Performed by: RADIOLOGY

## 2018-03-07 PROCEDURE — 25000125 ZZHC RX 250: Performed by: RADIOLOGY

## 2018-03-07 PROCEDURE — 77386 ZZH IMRT TREATMENT DELIVERY, COMPLEX: CPT | Performed by: RADIOLOGY

## 2018-03-07 RX ORDER — IOPAMIDOL 755 MG/ML
66 INJECTION, SOLUTION INTRAVASCULAR ONCE
Status: COMPLETED | OUTPATIENT
Start: 2018-03-07 | End: 2018-03-07

## 2018-03-07 RX ORDER — HEPARIN SODIUM (PORCINE) LOCK FLUSH IV SOLN 100 UNIT/ML 100 UNIT/ML
5 SOLUTION INTRAVENOUS ONCE
Status: COMPLETED | OUTPATIENT
Start: 2018-03-07 | End: 2018-03-07

## 2018-03-07 RX ADMIN — SODIUM CHLORIDE, PRESERVATIVE FREE 67 ML: 5 INJECTION INTRAVENOUS at 09:50

## 2018-03-07 RX ADMIN — SODIUM CHLORIDE, PRESERVATIVE FREE 5 ML: 5 INJECTION INTRAVENOUS at 10:03

## 2018-03-07 RX ADMIN — IOPAMIDOL 66 ML: 755 INJECTION, SOLUTION INTRAVENOUS at 09:50

## 2018-03-08 ENCOUNTER — ONCOLOGY VISIT (OUTPATIENT)
Dept: ONCOLOGY | Facility: CLINIC | Age: 78
End: 2018-03-08
Attending: DIETITIAN, REGISTERED
Payer: MEDICARE

## 2018-03-08 ENCOUNTER — APPOINTMENT (OUTPATIENT)
Dept: RADIATION ONCOLOGY | Facility: CLINIC | Age: 78
End: 2018-03-08
Attending: RADIOLOGY
Payer: MEDICARE

## 2018-03-08 DIAGNOSIS — C21.1 MALIGNANT NEOPLASM OF ANAL CANAL (H): Primary | ICD-10-CM

## 2018-03-08 PROCEDURE — 97802 MEDICAL NUTRITION INDIV IN: CPT | Mod: ZF | Performed by: DIETITIAN, REGISTERED

## 2018-03-08 PROCEDURE — 77386 ZZH IMRT TREATMENT DELIVERY, COMPLEX: CPT | Performed by: RADIOLOGY

## 2018-03-08 NOTE — MR AVS SNAPSHOT
After Visit Summary   3/8/2018    Elizabeth Taylor    MRN: [id number]           Patient Information     Date Of Birth          1940        Visit Information        Provider Department      3/8/2018 9:30 AM June Alonso RD [facility]        Today's Diagnoses     Malignant neoplasm of anal canal (H)    -  1       Follow-ups after your visit        Your next 10 appointments already scheduled     Mar 09, 2018  9:00 AM CST   EXTERNAL RADIATION TREATMENT with [facility] RAD ONC SANIYA   Radiation Oncology Clinic ([facility])    [facility]  1st Floor  [address] [address]   [phone]            Mar 09, 2018 12:00 PM CST   [facility] Lab Draw with  [facility] LAB DRAW   [facility] Lab Draw ([facility])    [address] [place] [address]  [address]  [facility] [address]   [phone]            Mar 09, 2018 12:30 PM CST   (Arrive by 12:15 PM)   Return Visit with Shen Ray MD   [facility] ([facility])    [address] [place] [address]  [address]  [facility] [address]   [phone]            Mar 09, 2018  1:00 PM CST   Infusion 120 with UC ONCOLOGY INFUSION, UC 18 ATC   [facility] ([facility])    [address]  [address]  [facility] [address]   [phone]            Mar 12, 2018  9:00 AM CDT   EXTERNAL RADIATION TREATMENT with [facility] RAD ONC SANIYA   Radiation Oncology Clinic ([facility])    [facility]  1st Floor  [address] [address]   [phone]            Mar 13, 2018  9:00 AM CDT   EXTERNAL RADIATION TREATMENT with [facility] RAD ONC SANIYA   Radiation Oncology Clinic ([facility])    [facility]  1st Floor  [address] [address]   [phone]            Mar 14, 2018  9:00 AM CDT   EXTERNAL  RADIATION TREATMENT with [facility] RAD ONC SANIYA   Radiation Oncology Clinic ([facility])    [facility]  1st Floor  [address] [address]   [phone]            Mar 15, 2018  9:00 AM CDT   EXTERNAL RADIATION TREATMENT with [facility] RAD ONC SANIYA   Radiation Oncology Clinic ([facility])    [facility]  1st Floor  [address] [address]   [phone]            Mar 15, 2018  9:15 AM CDT   ON TREATMENT VISIT with Zaire Madison MD   Radiation Oncology Clinic ([facility])    [facility]  1st Floor  [address] [address]   [phone]              Who to contact     If you have questions or need follow up information about today's clinic visit or your schedule please contact [facility] directly at [phone].  Normal or non-critical lab and imaging results will be communicated to you by MyChart, letter or phone within 4 business days after the clinic has received the results. If you do not hear from us within 7 days, please contact the clinic through [employer] or phone. If you have a critical or abnormal lab result, we will notify you by phone as soon as possible.  Submit refill requests through [employer] or call your pharmacy and they will forward the refill request to us. Please allow 3 business days for your refill to be completed.          Additional Information About Your Visit        [employer] Information     [employer] gives you secure access to your electronic health record. If you see a primary care provider, you can also send messages to your care team and make appointments. If you have questions, please call your primary care clinic.  If you do not have a primary care provider, please call [phone] and they will assist you.        Care EveryWhere ID     This is your Care EveryWhere ID. This could be used by other  organizations to access your [place] medical records  [id number]         Blood Pressure from Last 3 Encounters:   03/02/18 112/67   02/24/18 133/83   02/23/18 121/73    Weight from Last 3 Encounters:   03/05/18 49.4 kg (109 lb)   03/02/18 50.3 kg (110 lb 12.8 oz)   02/26/18 51 kg (112 lb 8 oz)              We Performed the Following     MNT INDIVIDUAL INITIAL EA 15 MIN        Primary Care Provider Office Phone # Fax #    Baron Seth [phone] [address]  [facility] [address]        Equal Access to Services     [facility] : Hadii cehlo hermano Humera, waaxda luqadaha, qaybta kaalmada eliseo, cyndy catalan. So [facility] [phone].    ATENCIÓN: Si habla español, tiene a ghosh disposición servicios gratuitos de asistencia lingüística. [facility] [phone].    We comply with applicable federal civil rights laws and [place] laws. We do not discriminate on the basis of race, color, national origin, age, disability, sex, sexual orientation, or gender identity.            Thank you!     Thank you for choosing [facility] CL[facility]  for your care. Our goal is always to provide you with excellent care. Hearing back from our patients is one way we can continue to improve our services. Please take a few minutes to complete the written survey that you may receive in the mail after your visit with us. Thank you!             Your Updated Medication List - Protect others around you: Learn how to safely use, store and throw away your medicines at www.disposemymeds.org.          This list is accurate as of 3/8/18 11:14 AM.  Always use your most recent med list.                   Brand Name Dispense Instructions for use Diagnosis    CALCIUM 500/VITAMIN D PO           hydrocortisone 2.5 % cream    ANUSOL-HC    30 g    Place rectally 2 times daily    External hemorrhoids       lidocaine-prilocaine cream    EMLA    5 g    Apply topically as needed for moderate  pain    Malignant neoplasm of anal canal (H)       loperamide 2 MG tablet    IMODIUM A-D    20 tablet    Take 1 tablet (2 mg) by mouth 4 times daily as needed for diarrhea    Malignant neoplasm of anal canal (H)       LORazepam 0.5 MG tablet    ATIVAN    30 tablet    Take 1 tablet (0.5 mg) by mouth every 4 hours as needed (Anxiety, Nausea/Vomiting or Sleep)    Malignant neoplasm of anal canal (H)       magic mouthwash suspension    ENTER INGREDIENTS IN COMMENTS    500 mL    Swish and spit 5-10 mL four times daily as needed    Malignant neoplasm of anal canal (H)       pramipexole 0.125 MG tablet    MIRAPEX    60 tablet    Take one tablet 2-3 hours before bedtime, may increase to 2 tablets after one week if needed    Restless leg syndrome       prochlorperazine 10 MG tablet    COMPAZINE    30 tablet    Take 1 tablet (10 mg) by mouth every 6 hours as needed (Nausea/Vomiting)    Malignant neoplasm of anal canal (H)       TYLENOL EXTRA STRENGTH PO      1 TABLET EVERY 4 HOURS AS NEEDED        vitamin B complex with vitamin C Tabs tablet      Take 1 tablet by mouth daily

## 2018-03-08 NOTE — LETTER
"3/8/2018       RE: Elizabeth Taylor  [address]   [place] [address]     Dear Colleague,    Thank you for referring your patient, Elizabeth Taylor, to the [facility]. Please see a copy of my visit note below.    CLINICAL NUTRITION SERVICES - ASSESSMENT NOTE    Elizabeth Taylor 77 year old referred for MNT related to anal cancer    Time Spent: 45 minutes  Visit Type:  Initial  Referring Physician:  Los Noble accompanied by:  her friend ([employer] student, Marleni).     NUTRITION HISTORY  Factors affecting nutrition intake include: previous - diarrhea, nausea  Current diet: general, low fiber, minimal dairy  Current appetite/intake: good  PEG Tube: No  Chemotherapy: 5FU/Mitomycin-C   Radiation: Yes       Elizabeth reports that her appetite/intake have been better since her diarrhea has been under control.    She started taking loperamide which help her diarrhea almost instantly.    She denies further nausea as well.  She has minimal and has been using magic mouth wash to help reduce pain.  She does have some taste changes and describes an 'acid taste'.  She has not been doing mouth rinses.   She tries to limit dairy as she has 'heard it can exacerbate diarrhea'.   She eats 3 meals/day and grazes throughout the day as well.    She has chicken noodle soup, green beans, white potatoes, olives, vanilla flavored 'Full [place]' milk, bread, peanut butter and eggs.     She engages in mild activity 3 times/day for 20 minutes each time (core, walking).  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    ANTHROPOMETRICS  Height: 63\"  Weight: 108 lbs/49kg  BMI: 18.8  Weight Status:  Underweight BMI <18.5  IBW: 115 lbs  % IBW: 94%  Weight History: noted 3 lb wt loss x past 2 weeks  Wt Readings from Last 6 Encounters:   03/05/18 49.4 kg (109 lb)   03/02/18 50.3 kg (110 lb 12.8 oz)   02/26/18 51 kg (112 lb 8 oz)   02/23/18 49.1 kg (108 lb 4.8 oz)   02/21/18 50.3 kg (111 lb)   02/16/18 50.9 kg (112 lb 3.4 " oz)     Medications/vitamins/minerals/herbals:   Reviewed  Loperamide     LABS  Labs reviewed    ASSESSED NUTRITION NEEDS:  Estimated Energy Needs: 8987-1526 kcals (30-35 Kcal/Kg)  Justification: increased needs with treatment/exercise  Estimated Protein Needs: 65-73 grams protein (1.2-1.5 g pro/Kg)  Justification: increased needs with treatment/exercise  Estimated Fluid Needs: 2000  mL   Justification: maintenance    MALNUTRITION:  % Weight Loss:  Weight loss does not meet criteria for malnutrition   % Intake:  Decreased intake does not meet criteria for malnutrition   Subcutaneous Fat Loss:  None observed  Muscle Loss:  None observed  Fluid Retention:  None noted    Malnutrition Diagnosis: Patient does not meet two of the above criteria necessary for diagnosing malnutrition but is at risk.     NUTRITION DIAGNOSIS:  Predicted suboptimal nutrient intake related to cancer treatment, with h/o poor intake and diarrhea.     INTERVENTIONS  Recommendations / Nutrition Prescription  1. 5-6 small frequent meals, 2000mL fluids (keep fluids separate from meals by 30 minutes as able)  2. 1500-1700kcal, >65g protein/day  Nutrition Education     Provided written & verbal education on:   - Ways to optimize kcal and protein intake. Discussed calorie and protein needs for maintenance of weight and nutrition status.  Advised pt to aim for at least 1500kcal and 65g protein via 5-6 small frequent meals.   - Coping with barriers - as chemotherapy/radiation progresses, eating may become more difficult and discussed ways to cope with this. (Coping with Diarrhea and Coping with Mucositis).  Encouraged to rinse frequently with water, baking soda and salt mixture (recipe provided).     Provided pt with corresponding education materials/handouts on:  Six Small Meals a Day, High Calorie, High Protein Recipe booklet, Tips to Increase the Calories in Your Diet,  and Protein Sources.      Pt verbalize understanding of materials provided during  consult.   Patient Understanding: Excellent  Expected Compliance: Excellent     Goals  1.  Aim for 5-6 small frequent meals  2.  Aim for 1500kcal and 65g protein/day  3. Weight maintenance through cancer treatment/weight gain towards  lbs      Follow-Up Plans: Pt has RD contact information for questions.    Pt encouraged to follow up with RD in 2 weeks at the time of infusion or radiation treatment.     MONITORING AND EVALUATION:  -Food intake  -Fluid/beverage intake  -Weight trends    June Cooper RD, LD

## 2018-03-08 NOTE — PROGRESS NOTES
"CLINICAL NUTRITION SERVICES - ASSESSMENT NOTE    Elizabeth Taylor 77 year old referred for MNT related to anal cancer    Time Spent: 45 minutes  Visit Type: Initial  Referring Physician: Los Noble accompanied by: her friend ([employer] student, Marleni).     NUTRITION HISTORY  Factors affecting nutrition intake include: previous - diarrhea, nausea  Current diet: general, low fiber, minimal dairy  Current appetite/intake: good  PEG Tube: No  Chemotherapy: 5FU/Mitomycin-C   Radiation: Yes       Elizabeth reports that her appetite/intake have been better since her diarrhea has been under control.    She started taking loperamide which help her diarrhea almost instantly.    She denies further nausea as well.  She has minimal and has been using magic mouth wash to help reduce pain.  She does have some taste changes and describes an 'acid taste'.  She has not been doing mouth rinses.   She tries to limit dairy as she has 'heard it can exacerbate diarrhea'.   She eats 3 meals/day and grazes throughout the day as well.    She has chicken noodle soup, green beans, white potatoes, olives, vanilla flavored 'Full [place]' milk, bread, peanut butter and eggs.     She engages in mild activity 3 times/day for 20 minutes each time (core, walking).  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    ANTHROPOMETRICS  Height: 63\"  Weight: 108 lbs/49kg  BMI: 18.8  Weight Status:  Underweight BMI <18.5  IBW: 115 lbs  % IBW: 94%  Weight History: noted 3 lb wt loss x past 2 weeks  Wt Readings from Last 6 Encounters:   03/05/18 49.4 kg (109 lb)   03/02/18 50.3 kg (110 lb 12.8 oz)   02/26/18 51 kg (112 lb 8 oz)   02/23/18 49.1 kg (108 lb 4.8 oz)   02/21/18 50.3 kg (111 lb)   02/16/18 50.9 kg (112 lb 3.4 oz)     Medications/vitamins/minerals/herbals:   Reviewed  Loperamide     LABS  Labs reviewed    ASSESSED NUTRITION NEEDS:  Estimated Energy Needs: 5045-8877 kcals (30-35 Kcal/Kg)  Justification: increased needs with treatment/exercise  Estimated Protein Needs: " 65-73 grams protein (1.2-1.5 g pro/Kg)  Justification: increased needs with treatment/exercise  Estimated Fluid Needs: 2000  mL   Justification: maintenance    MALNUTRITION:  % Weight Loss:  Weight loss does not meet criteria for malnutrition   % Intake:  Decreased intake does not meet criteria for malnutrition   Subcutaneous Fat Loss:  None observed  Muscle Loss:  None observed  Fluid Retention:  None noted    Malnutrition Diagnosis: Patient does not meet two of the above criteria necessary for diagnosing malnutrition but is at risk.     NUTRITION DIAGNOSIS:  Predicted suboptimal nutrient intake related to cancer treatment, with h/o poor intake and diarrhea.     INTERVENTIONS  Recommendations / Nutrition Prescription  1. 5-6 small frequent meals, 2000mL fluids (keep fluids separate from meals by 30 minutes as able)  2. 1500-1700kcal, >65g protein/day  Nutrition Education     Provided written & verbal education on:   - Ways to optimize kcal and protein intake. Discussed calorie and protein needs for maintenance of weight and nutrition status.  Advised pt to aim for at least 1500kcal and 65g protein via 5-6 small frequent meals.   - Coping with barriers - as chemotherapy/radiation progresses, eating may become more difficult and discussed ways to cope with this. (Coping with Diarrhea and Coping with Mucositis).  Encouraged to rinse frequently with water, baking soda and salt mixture (recipe provided).     Provided pt with corresponding education materials/handouts on:  Six Small Meals a Day, High Calorie, High Protein Recipe booklet, Tips to Increase the Calories in Your Diet,  and Protein Sources.      Pt verbalize understanding of materials provided during consult.   Patient Understanding: Excellent  Expected Compliance: Excellent     Goals  1.  Aim for 5-6 small frequent meals  2.  Aim for 1500kcal and 65g protein/day  3. Weight maintenance through cancer treatment/weight gain towards  lbs      Follow-Up  Plans: Pt has RD contact information for questions.    Pt encouraged to follow up with RD in 2 weeks at the time of infusion or radiation treatment.     MONITORING AND EVALUATION:  -Food intake  -Fluid/beverage intake  -Weight trends    June Cooper RD, LD

## 2018-03-09 ENCOUNTER — ONCOLOGY VISIT (OUTPATIENT)
Dept: ONCOLOGY | Facility: CLINIC | Age: 78
End: 2018-03-09
Attending: INTERNAL MEDICINE
Payer: MEDICARE

## 2018-03-09 ENCOUNTER — APPOINTMENT (OUTPATIENT)
Dept: RADIATION ONCOLOGY | Facility: CLINIC | Age: 78
End: 2018-03-09
Attending: RADIOLOGY
Payer: MEDICARE

## 2018-03-09 ENCOUNTER — CARE COORDINATION (OUTPATIENT)
Dept: ONCOLOGY | Facility: CLINIC | Age: 78
End: 2018-03-09

## 2018-03-09 ENCOUNTER — APPOINTMENT (OUTPATIENT)
Dept: LAB | Facility: CLINIC | Age: 78
End: 2018-03-09
Attending: INTERNAL MEDICINE
Payer: MEDICARE

## 2018-03-09 VITALS
DIASTOLIC BLOOD PRESSURE: 68 MMHG | RESPIRATION RATE: 18 BRPM | BODY MASS INDEX: 19.52 KG/M2 | OXYGEN SATURATION: 96 % | WEIGHT: 112.8 LBS | SYSTOLIC BLOOD PRESSURE: 102 MMHG | TEMPERATURE: 98.3 F | HEART RATE: 78 BPM

## 2018-03-09 VITALS
OXYGEN SATURATION: 96 % | WEIGHT: 112.8 LBS | RESPIRATION RATE: 16 BRPM | HEART RATE: 89 BPM | HEIGHT: 64 IN | SYSTOLIC BLOOD PRESSURE: 89 MMHG | DIASTOLIC BLOOD PRESSURE: 66 MMHG | TEMPERATURE: 98.3 F | BODY MASS INDEX: 19.26 KG/M2

## 2018-03-09 DIAGNOSIS — C21.1 MALIGNANT NEOPLASM OF ANAL CANAL (H): Primary | ICD-10-CM

## 2018-03-09 LAB
ALBUMIN SERPL-MCNC: 2.6 G/DL (ref 3.4–5)
ALP SERPL-CCNC: 58 U/L (ref 40–150)
ALT SERPL W P-5'-P-CCNC: 23 U/L (ref 0–50)
ANION GAP SERPL CALCULATED.3IONS-SCNC: 8 MMOL/L (ref 3–14)
AST SERPL W P-5'-P-CCNC: 29 U/L (ref 0–45)
BASOPHILS # BLD AUTO: 0 10E9/L (ref 0–0.2)
BASOPHILS NFR BLD AUTO: 0.5 %
BILIRUB SERPL-MCNC: 1 MG/DL (ref 0.2–1.3)
BUN SERPL-MCNC: 19 MG/DL (ref 7–30)
BURR CELLS BLD QL SMEAR: SLIGHT
CALCIUM SERPL-MCNC: 8 MG/DL (ref 8.5–10.1)
CHLORIDE SERPL-SCNC: 100 MMOL/L (ref 94–109)
CO2 SERPL-SCNC: 27 MMOL/L (ref 20–32)
CREAT SERPL-MCNC: 0.68 MG/DL (ref 0.52–1.04)
DIFFERENTIAL METHOD BLD: ABNORMAL
EOSINOPHIL # BLD AUTO: 0 10E9/L (ref 0–0.7)
EOSINOPHIL NFR BLD AUTO: 1.4 %
ERYTHROCYTE [DISTWIDTH] IN BLOOD BY AUTOMATED COUNT: 13.8 % (ref 10–15)
GFR SERPL CREATININE-BSD FRML MDRD: 84 ML/MIN/1.7M2
GLUCOSE SERPL-MCNC: 94 MG/DL (ref 70–99)
HCT VFR BLD AUTO: 35.7 % (ref 35–47)
HGB BLD-MCNC: 12.1 G/DL (ref 11.7–15.7)
IMM GRANULOCYTES # BLD: 0 10E9/L (ref 0–0.4)
IMM GRANULOCYTES NFR BLD: 0.5 %
LYMPHOCYTES # BLD AUTO: 0.2 10E9/L (ref 0.8–5.3)
LYMPHOCYTES NFR BLD AUTO: 9.3 %
MCH RBC QN AUTO: 31.3 PG (ref 26.5–33)
MCHC RBC AUTO-ENTMCNC: 33.9 G/DL (ref 31.5–36.5)
MCV RBC AUTO: 92 FL (ref 78–100)
MONOCYTES # BLD AUTO: 0.6 10E9/L (ref 0–1.3)
MONOCYTES NFR BLD AUTO: 28.5 %
NEUTROPHILS # BLD AUTO: 1.3 10E9/L (ref 1.6–8.3)
NEUTROPHILS NFR BLD AUTO: 59.8 %
NRBC # BLD AUTO: 0 10*3/UL
NRBC BLD AUTO-RTO: 0 /100
PLATELET # BLD AUTO: 175 10E9/L (ref 150–450)
PLATELET # BLD EST: ABNORMAL 10*3/UL
POIKILOCYTOSIS BLD QL SMEAR: SLIGHT
POTASSIUM SERPL-SCNC: 3.1 MMOL/L (ref 3.4–5.3)
PROT SERPL-MCNC: 5.2 G/DL (ref 6.8–8.8)
RBC # BLD AUTO: 3.87 10E12/L (ref 3.8–5.2)
SODIUM SERPL-SCNC: 135 MMOL/L (ref 133–144)
WBC # BLD AUTO: 2.1 10E9/L (ref 4–11)

## 2018-03-09 PROCEDURE — 77336 RADIATION PHYSICS CONSULT: CPT | Performed by: RADIOLOGY

## 2018-03-09 PROCEDURE — 99215 OFFICE O/P EST HI 40 MIN: CPT | Mod: ZP | Performed by: INTERNAL MEDICINE

## 2018-03-09 PROCEDURE — A9270 NON-COVERED ITEM OR SERVICE: HCPCS | Mod: GY | Performed by: INTERNAL MEDICINE

## 2018-03-09 PROCEDURE — 25000132 ZZH RX MED GY IP 250 OP 250 PS 637: Mod: GY | Performed by: INTERNAL MEDICINE

## 2018-03-09 PROCEDURE — 96360 HYDRATION IV INFUSION INIT: CPT

## 2018-03-09 PROCEDURE — 77386 ZZH IMRT TREATMENT DELIVERY, COMPLEX: CPT | Performed by: RADIOLOGY

## 2018-03-09 PROCEDURE — 85025 COMPLETE CBC W/AUTO DIFF WBC: CPT | Performed by: INTERNAL MEDICINE

## 2018-03-09 PROCEDURE — 25000128 H RX IP 250 OP 636: Mod: ZF | Performed by: INTERNAL MEDICINE

## 2018-03-09 PROCEDURE — 80053 COMPREHEN METABOLIC PANEL: CPT | Performed by: INTERNAL MEDICINE

## 2018-03-09 PROCEDURE — G0463 HOSPITAL OUTPT CLINIC VISIT: HCPCS | Mod: ZF

## 2018-03-09 RX ORDER — SODIUM CHLORIDE 9 MG/ML
1000 INJECTION, SOLUTION INTRAVENOUS CONTINUOUS PRN
Status: CANCELLED
Start: 2018-03-13

## 2018-03-09 RX ORDER — POTASSIUM CHLORIDE 1500 MG/1
40 TABLET, EXTENDED RELEASE ORAL ONCE
Status: CANCELLED
Start: 2018-03-09 | End: 2018-03-09

## 2018-03-09 RX ORDER — POTASSIUM CHLORIDE 1500 MG/1
20 TABLET, EXTENDED RELEASE ORAL DAILY
Qty: 4 TABLET | Refills: 0 | Status: SHIPPED | OUTPATIENT
Start: 2018-03-09 | End: 2019-02-07

## 2018-03-09 RX ORDER — HEPARIN SODIUM (PORCINE) LOCK FLUSH IV SOLN 100 UNIT/ML 100 UNIT/ML
5 SOLUTION INTRAVENOUS ONCE
Status: COMPLETED | OUTPATIENT
Start: 2018-03-09 | End: 2018-03-09

## 2018-03-09 RX ORDER — ALBUTEROL SULFATE 90 UG/1
1-2 AEROSOL, METERED RESPIRATORY (INHALATION)
Status: CANCELLED
Start: 2018-03-13

## 2018-03-09 RX ORDER — DIPHENHYDRAMINE HYDROCHLORIDE 50 MG/ML
50 INJECTION INTRAMUSCULAR; INTRAVENOUS
Status: CANCELLED
Start: 2018-03-13

## 2018-03-09 RX ORDER — EPINEPHRINE 0.3 MG/.3ML
0.3 INJECTION SUBCUTANEOUS EVERY 5 MIN PRN
Status: CANCELLED | OUTPATIENT
Start: 2018-03-13

## 2018-03-09 RX ORDER — LORAZEPAM 2 MG/ML
0.5 INJECTION INTRAMUSCULAR EVERY 4 HOURS PRN
Status: CANCELLED
Start: 2018-03-13

## 2018-03-09 RX ORDER — ALBUTEROL SULFATE 0.83 MG/ML
2.5 SOLUTION RESPIRATORY (INHALATION)
Status: CANCELLED | OUTPATIENT
Start: 2018-03-13

## 2018-03-09 RX ORDER — METHYLPREDNISOLONE SODIUM SUCCINATE 125 MG/2ML
125 INJECTION, POWDER, LYOPHILIZED, FOR SOLUTION INTRAMUSCULAR; INTRAVENOUS
Status: CANCELLED
Start: 2018-03-13

## 2018-03-09 RX ORDER — EPINEPHRINE 1 MG/ML
0.3 INJECTION, SOLUTION, CONCENTRATE INTRAVENOUS EVERY 5 MIN PRN
Status: CANCELLED | OUTPATIENT
Start: 2018-03-13

## 2018-03-09 RX ORDER — POTASSIUM CHLORIDE 1500 MG/1
40 TABLET, EXTENDED RELEASE ORAL ONCE
Status: COMPLETED | OUTPATIENT
Start: 2018-03-09 | End: 2018-03-09

## 2018-03-09 RX ORDER — MEPERIDINE HYDROCHLORIDE 25 MG/ML
25 INJECTION INTRAMUSCULAR; INTRAVENOUS; SUBCUTANEOUS EVERY 30 MIN PRN
Status: CANCELLED | OUTPATIENT
Start: 2018-03-13

## 2018-03-09 RX ADMIN — POTASSIUM CHLORIDE 40 MEQ: 20 TABLET, EXTENDED RELEASE ORAL at 13:26

## 2018-03-09 RX ADMIN — SODIUM CHLORIDE 1000 ML: 9 INJECTION, SOLUTION INTRAVENOUS at 13:22

## 2018-03-09 RX ADMIN — SODIUM CHLORIDE, PRESERVATIVE FREE 5 ML: 5 INJECTION INTRAVENOUS at 14:45

## 2018-03-09 ASSESSMENT — PAIN SCALES - GENERAL
PAINLEVEL: NO PAIN (0)
PAINLEVEL: NO PAIN (0)

## 2018-03-09 NOTE — PROGRESS NOTES
Infusion Nursing Note:  Elizabeth Taylor presents today for IVF and oral K replacement.    Patient seen by provider today: Yes: Dr. Ray    Note: Patient presented to clinic today with c/o fatigue.  Administered IVF and K orally per order.    Intravenous Access:  Implanted Port.    Treatment Conditions:  Lab Results   Component Value Date    HGB 12.1 03/09/2018     Lab Results   Component Value Date    WBC 2.1 03/09/2018      Lab Results   Component Value Date    ANEU 1.3 03/09/2018     Lab Results   Component Value Date     03/09/2018      Lab Results   Component Value Date     03/09/2018                   Lab Results   Component Value Date    POTASSIUM 3.1 03/09/2018           No results found for: MAG         Lab Results   Component Value Date    CR 0.68 03/09/2018                   Lab Results   Component Value Date    ELISE 8.0 03/09/2018                Lab Results   Component Value Date    BILITOTAL 1.0 03/09/2018           Lab Results   Component Value Date    ALBUMIN 2.6 03/09/2018                    Lab Results   Component Value Date    ALT 23 03/09/2018           Lab Results   Component Value Date    AST 29 03/09/2018     Results reviewed, labs MET treatment parameters--too early, pt to be rescheduled for next week..    Post Infusion Assessment:  Patient tolerated infusion without incident.  Blood return noted pre and post infusion.  Site patent and intact, free from redness, edema or discomfort.  No evidence of extravasations.  Access discontinued per protocol.    Discharge Plan:   Prescription refills given for Potassium and Emla cream.  Discharge instructions reviewed with: Patient.  Patient and/or family verbalized understanding of discharge instructions and all questions answered.  Copy of AVS reviewed with patient and/or family.  Patient will return 3/13/2018 for next appointment once approved by infusion --pt aware RN[facility] will contact her.  Departure Mode: Ambulatory.  Face to  Face time: 5 minutes.    Lashae Seaman RN

## 2018-03-09 NOTE — PROGRESS NOTES
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 She underwent a colonoscopy in April 2017 which noted to have small anal fissure along with internal hemorrhoids. Symptoms continued and  she was referred to colorectal surgery for further evaluation. On exam she was found to have an 1.5 cm anal lesion on the left side along with left groin palpable adenopathy. Biopsy of anal mass came back positive for squamous cell carcinoma. Patient underwent staging workup with MRI pelvis and a PET scan- showed PET avid left anal canal mass along with small left inguinal FDG avid lymph nodes.     02/12/18- Started on concurrent chemoradiation with 5FU/Mitomycin    SUBJECTIVE     She is one-week follow-up reviewed developed diarrhea over the weekend was prescribed Imodium which has helped. She is trying her best to keep up with oral intake. Denies fever/chills, nausea/vomiting, abdominal pain or any other complaints         PAST MEDICAL HISTORY     Past Medical History:   Diagnosis Date     Endometriosis, site unspecified      Thyroiditis, unspecified     Thryoiditis-resolved         CURRENT OUTPATIENT MEDICATIONS     Current Outpatient Prescriptions   Medication Sig Dispense Refill     potassium chloride SA (KLOR-CON) 20 MEQ CR tablet Take 1 tablet (20 mEq) by mouth daily for 4 days 4 tablet 0     loperamide (IMODIUM A-D) 2 MG tablet Take 1 tablet (2 mg) by mouth 4 times daily as needed for diarrhea 20 tablet 0     vitamin B complex with vitamin C (VITAMIN  B COMPLEX) TABS tablet Take 1 tablet by mouth daily       Calcium  Carbonate-Vitamin D (CALCIUM 500/VITAMIN D PO)        hydrocortisone (ANUSOL-HC) 2.5 % cream Place rectally 2 times daily 30 g 1     pramipexole (MIRAPEX) 0.125 MG tablet Take one tablet 2-3 hours before bedtime, may increase to 2 tablets after one week if needed 60 tablet 5     TYLENOL EXTRA STRENGTH OR 1 TABLET EVERY 4 HOURS AS NEEDED       lidocaine-prilocaine (EMLA) cream Apply topically as needed for moderate pain (Patient not taking: Reported on 3/9/2018) 5 g 1     magic mouthwash (ENTER INGREDIENTS IN COMMENTS) suspension Swish and spit 5-10 mL four times daily as needed (Patient not taking: Reported on 3/2/2018) 500 mL 1     LORazepam (ATIVAN) 0.5 MG tablet Take 1 tablet (0.5 mg) by mouth every 4 hours as needed (Anxiety, Nausea/Vomiting or Sleep) (Patient not taking: Reported on 3/2/2018) 30 tablet 1     prochlorperazine (COMPAZINE) 10 MG tablet Take 1 tablet (10 mg) by mouth every 6 hours as needed (Nausea/Vomiting) (Patient not taking: Reported on 3/9/2018) 30 tablet 1        ALLERGIES     Allergies   Allergen Reactions     Darvon [Propoxyphene]      Had reaction in teen years     Penicillins      As a child     Ketoconazole Rash        REVIEW OF SYSTEMS   As above in the HPI, o/w complete 12-point ROS was negative.     PHYSICAL EXAM   B/P: 131/82, T: 98.5, P: 78, R: 16  SpO2 Readings from Last 4 Encounters:   03/09/18 96%   03/09/18 96%   03/02/18 99%   02/24/18 99%     Wt Readings from Last 3 Encounters:   03/09/18 51.2 kg (112 lb 12.8 oz)   03/09/18 51.2 kg (112 lb 12.8 oz)   03/05/18 49.4 kg (109 lb)     GEN: NAD  EYES:PERRLA  Mouth/ENT: Oropharynx is clear. Mouth sores  NECK: no cervical or supraclavicular lymphadenopathy  LUNGS: clear bilaterally  CV: regular, no murmurs, rubs, or gallops  ABDOMEN: soft, non-tender, non-distended, normal bowel sounds  EXT: warm, well perfused, no edema  NEURO: alert  SKIN: no rashes     LABORATORY AND IMAGING STUDIES     Lab Results   Component Value Date    WBC 1.7  03/02/2018     Lab Results   Component Value Date    RBC 4.02 03/02/2018     Lab Results   Component Value Date    HGB 12.6 03/02/2018     Lab Results   Component Value Date    HCT 38.0 03/02/2018     No components found for: MCT  Lab Results   Component Value Date    MCV 95 03/02/2018     Lab Results   Component Value Date    MCH 31.3 03/02/2018     Lab Results   Component Value Date    MCHC 33.2 03/02/2018     Lab Results   Component Value Date    RDW 13.2 03/02/2018     Lab Results   Component Value Date    PLT 92 03/02/2018       Recent Labs   Lab Test  03/02/18   0955  02/23/18   0935   NA  139  140   POTASSIUM  3.6  3.8   CHLORIDE  106  106   CO2  28  28   ANIONGAP  5  6   GLC  89  105*   BUN  15  18   CR  0.68  0.67   ELISE  8.7  8.9     IMPRESSION:   1. Significant wall thickening and mucosal hyperenhancement of the  rectum and sigmoid colon consistent with history of diarrheal illness,  possibly related to radiation therapy.  2. Mild free peritoneal fluid. Large IVC, hepatic and portal veins,  and periportal edema suggesting patient is volume up.        ASSESSMENT AND PLAN     77-year-old female with no significant past medical history who presented with complaints of bright red blood per rectum and was recently found to have an anal mass which on biopsy came back for positive for invasive squamous cell carcinoma. Staging workup showed a PET avid anal mass along with hypermetabolic left inguinal adenopathy.     - Squamous cell carcinoma Anal canal  T2N1- IIIA  Started on definitive chemoradiation with infusional FU 1000 mg/m2 on days 1 to 4 and 29 to 32, plus mitomycin 10 mg/m2 on days 1 and 29 (as tolerated), maximum 20 mg per dose- day 1 on 02/12/18  Day 29 scheduled for 03/13/18. Will skip Mitomycin and proceed with 5FU at reduced dose given neutropenia/old age    - Mouth sores- Improved   Continue Magic mouthwash prn    - Neutropenia  Secondary to chemotherapy.    - Hypokalemia  Will replace today.    Prescrioption also given for 4 days     Hypotension/Decreased po intake  IV fluids today    Return to infusion on Tuesday to proceed with Day 29 of chemo. RTC with JOVANI the following week for toxicity check and in 4 weeks for follow up with me on 04/06/18.     Chart documentation with Dragon Voice recognition Software. Although reviewed after completion, some words and grammatical errors may remain.  Shen Ray MD  Attending Physician   Hematology/Medical Oncology

## 2018-03-09 NOTE — MR AVS SNAPSHOT
After Visit Summary   3/9/2018    Elizabeth Taylor    MRN: [id number]           Patient Information     Date Of Birth          1940        Visit Information        Provider Department      3/9/2018 12:30 PM Shen Ray MD [facility]        Today's Diagnoses     Malignant neoplasm of anal canal (H)    -  1       Follow-ups after your visit        Your next 10 appointments already scheduled     Mar 17, 2018 10:00 AM CDT   Infusion 120 with UC ONCOLOGY INFUSION, UC 15 ATC   [facility] ([facility])    [address]  [address]  [facility] [address]   [phone]            Mar 19, 2018  9:00 AM CDT   EXTERNAL RADIATION TREATMENT with UMP RAD ONC SANIYA   Radiation Oncology Clinic ([facility])    [facility]  1st Floor  [address] [address]   [phone]            Mar 20, 2018  9:00 AM CDT   EXTERNAL RADIATION TREATMENT with UMP RAD ONC SANIYA   Radiation Oncology Clinic ([facility])    [facility]  1st Floor  [address] [address]   [phone]            Mar 21, 2018  9:00 AM CDT   EXTERNAL RADIATION TREATMENT with UMP RAD ONC SANIYA   Radiation Oncology Clinic ([facility])    [facility]  1st Floor  [address] [address]   [phone]            Mar 22, 2018  9:00 AM CDT   EXTERNAL RADIATION TREATMENT with UMP RAD ONC SANIYA   Radiation Oncology Clinic ([facility])    [facility]  1st Floor  [address] [address]   [phone]            Mar 23, 2018  9:00 AM CDT   EXTERNAL RADIATION TREATMENT with UMP RAD ONC SANIYA   Radiation Oncology Clinic ([facility])    [facility]  1st Floor  [address] [address]   [phone]            Mar 23, 2018   9:45 AM CDT   [facility] Lab Draw with  [facility] LAB DRAW   [facility]    [address]  [address]  [facility] [address] [phone]            Mar 23, 2018 10:20 AM CDT   (Arrive by 10:05 AM)   Return Visit with GRAHAM Calvillo CNP   [facility] ([facility])    [address]  [address]  [facility] [id number] [phone]            Apr 06, 2018 10:00 AM CDT   [facility] Lab Draw with  M[facility] LAB DRAW   [facility]    [address]  [address]  [facility] [id number] [phone]              Who to contact     If you have questions or need follow up information about today's clinic visit or your schedule please contact [facility] directly at [phone].  Normal or non-critical lab and imaging results will be communicated to you by Symcathart, letter or phone within 4 business days after the clinic has received the results. If you do not hear from us within 7 days, please contact the clinic through [employer] or phone. If you have a critical or abnormal lab result, we will notify you by phone as soon as possible.  Submit refill requests through SetMeUp or call your pharmacy and they will forward the refill request to us. Please allow 3 business days for your refill to be completed.          Additional Information About Your Visit        SymcatharRumble Information     SetMeUp gives you secure access to your electronic health record. If you see a primary care provider, you can also send messages to your care team and make appointments. If you have questions, please call your primary care clinic.  If you do not have a primary care provider, please call [phone] and they will assist you.        Care EveryWhere ID     This is your Care EveryWhere ID. This could be used by other organizations to access your [place]  "medical records  [id number]        Your Vitals Were     Pulse Temperature Respirations Height Pulse Oximetry Breastfeeding?    89 98.3  F (36.8  C) (Oral) 16 1.619 m (5' 3.74\") 96% No    BMI (Body Mass Index)                   19.52 kg/m2            Blood Pressure from Last 3 Encounters:   No data found for BP    Weight from Last 3 Encounters:   No data found for Wt              Today, you had the following     No orders found for display         Today's Medication Changes          These changes are accurate as of 3/9/18 11:59 PM.  If you have any questions, ask your nurse or doctor.               Start taking these medicines.        Dose/Directions    potassium chloride SA 20 MEQ CR tablet   Commonly known as:  KLOR-CON   Used for:  Malignant neoplasm of anal canal (H)   Started by:  Shen Ray MD        Dose:  20 mEq   Take 1 tablet (20 mEq) by mouth daily for 4 days   Quantity:  4 tablet   Refills:  0            Where to get your medicines      These medications were sent to [facility] - [place], [place] - [address]  [address], [place] [address]     Phone:  [phone]     potassium chloride SA 20 MEQ CR tablet                Primary Care Provider Office Phone # Fax #    Baron Seth [phone]       [address] [place] [address]        Equal Access to Services     [facility]: Hadii chelo ku hadasho Soomaali, waaxda luqadaha, qaybta kaalmada adeegyada, waxay wanda hayelly walton . So [facility] [phone].    ATENCIÓN: Si habla español, tiene a ghosh disposición servicios gratuitos de asistencia lingüística. ame al [phone].    We comply with applicable federal civil rights laws and [place] laws. We do not discriminate on the basis of race, color, national origin, age, disability, sex, sexual orientation, or gender identity.            Thank you!     Thank you for choosing [facility]  for your care. Our goal is " always to provide you with excellent care. Hearing back from our patients is one way we can continue to improve our services. Please take a few minutes to complete the written survey that you may receive in the mail after your visit with us. Thank you!             Your Updated Medication List - Protect others around you: Learn how to safely use, store and throw away your medicines at www.disposemymeds.org.          This list is accurate as of 3/9/18 11:59 PM.  Always use your most recent med list.                   Brand Name Dispense Instructions for use Diagnosis    CALCIUM 500/VITAMIN D PO           hydrocortisone 2.5 % cream    ANUSOL-HC    30 g    Place rectally 2 times daily    External hemorrhoids       lidocaine-prilocaine cream    EMLA    5 g    Apply topically as needed for moderate pain    Malignant neoplasm of anal canal (H)       LORazepam 0.5 MG tablet    ATIVAN    30 tablet    Take 1 tablet (0.5 mg) by mouth every 4 hours as needed (Anxiety, Nausea/Vomiting or Sleep)    Malignant neoplasm of anal canal (H)       magic mouthwash suspension    ENTER INGREDIENTS IN COMMENTS    500 mL    Swish and spit 5-10 mL four times daily as needed    Malignant neoplasm of anal canal (H)       potassium chloride SA 20 MEQ CR tablet    KLOR-CON    4 tablet    Take 1 tablet (20 mEq) by mouth daily for 4 days    Malignant neoplasm of anal canal (H)       pramipexole 0.125 MG tablet    MIRAPEX    60 tablet    Take one tablet 2-3 hours before bedtime, may increase to 2 tablets after one week if needed    Restless leg syndrome       prochlorperazine 10 MG tablet    COMPAZINE    30 tablet    Take 1 tablet (10 mg) by mouth every 6 hours as needed (Nausea/Vomiting)    Malignant neoplasm of anal canal (H)       TYLENOL EXTRA STRENGTH PO      1 TABLET EVERY 4 HOURS AS NEEDED        vitamin B complex with vitamin C Tabs tablet      Take 1 tablet by mouth daily

## 2018-03-09 NOTE — PATIENT INSTRUCTIONS
Contact Numbers    [facility] Main Line: [phone]  [facility] Triage:  [phone]    Call triage with chills and/or temperature greater than or equal to 100.5, uncontrolled nausea/vomiting, diarrhea, constipation, dizziness, shortness of breath, chest pain, bleeding, unexplained bruising, or any new/concerning symptoms, questions/concerns.     If you are having any concerning symptoms or wish to speak to a provider before your next infusion visit, please call your care coordinator or triage to notify them so we can adequately serve you.       After Hours: [phone]    If after hours, weekends, or holidays, call main hospital  and ask for Oncology doctor on call.         March 2018 Sunday Monday Tuesday Wednesday Thursday Friday Saturday                       1     UMP EXTERNAL RADIATION TREATMT    9:00 AM   (15 min.)   [facility] RAD ONC SANIYA   Radiation Oncology Clinic 2     P EXTERNAL RADIATION TREATMT    9:00 AM   (15 min.)   [facility] RAD ONC SANIYA   Radiation Oncology Clinic     [facility] MAS[facility] LAB DRAW    9:15 AM   (15 min.)    [facility] LAB DRAW   [facility] Lab Draw     [facility] RETURN    9:45 AM   (30 min.)   Shen Ray MD   [facility] ONC INFUSION 120   10:30 AM   (120 min.)   [facility] ONCOLOGY INFUSION   [facility] 3       4     5     UMP EXTERNAL RADIATION TREATMT    9:00 AM   (15 min.)   [facility] RAD ONC SANIYA   Radiation Oncology Clinic     [facility] ON TREATMENT VISIT    9:15 AM   (15 min.)   Zaire Madison MD   Radiation Oncology Clinic 6     UMP EXTERNAL RADIATION TREATMT    9:00 AM   (15 min.)   [facility] RAD ONC SANIYA   Radiation Oncology Clinic 7     P EXTERNAL RADIATION TREATMT    9:00 AM   (15 min.)   [facility] RAD ONC SANIYA   Radiation Oncology Clinic     CT ABDOMEN PELVIS W    9:25 AM   (20 min.)   [id number]   [facility] [place], [place] 8     UMP EXTERNAL RADIATION TREATMT    9:00 AM   (15 min.)   [facility] RAD ONC SANIYA   Radiation Oncology Clinic     P NEW    9:30 AM   (60 min.)    June Alonso RD   M [facility] 9     UMP EXTERNAL RADIATION TREATMT    9:00 AM   (15 min.)   [facility] RAD ONC SANIYA   Radiation Oncology Clinic     [facility] LAB DRAW   12:00 PM   (15 min.)    [facility] LAB DRAW   [facility] Lab Draw     [facility] RETURN   12:15 PM   (30 min.)   Shen Ray MD   M [facility]     [facility] ONC INFUSION 120    1:00 PM   (120 min.)   [facility] ONCOLOGY INFUSION   [facility] 10       11     12     [facility] EXTERNAL RADIATION TREATMT    9:00 AM   (15 min.)   [facility] RAD ONC SANIYA   Rad[facility] Oncology Clinic 13     [facility] EXTERNAL RADIATION TREATMT    9:00 AM   (15 min.)   [facility] RAD ONC SANIYA   Radiation Oncology Clinic 14     [facility] EXTERNAL RADIATION TREATMT    9:00 AM   (15 min.)   [facility] RAD ONC SANIYA   Radiation Oncology Clinic 15     [facility] EXTERNAL RADIATION TREATMT    9:00 AM   (15 min.)   [facility] RAD ONC SANIYA   Radiation Oncology Clinic     [facility] ON TREATMENT VISIT    9:15 AM   (15 min.)   Zaire Madison MD   [facility] 16     [facility] EXTERNAL RADIATION TREATMT    9:00 AM   (15 min.)   [facility] RAD ONC SANIYA   [facility] Oncology Clinic 17       18     19     [facility] EXTERNAL RADIATION TREATMT    9:00 AM   (15 min.)   [facility] RAD ONC SANIYA   Radiation Oncology Clinic     [facility] ON TREATMENT VISIT    9:15 AM   (15 min.)   Zaire Madison MD   [facility] 20     UMP EXTERNAL RADIATION TREATMT    9:00 AM   (15 min.)   [facility] RAD ONC SANIYA   Radiation Oncology Clinic 21     [facility] EXTERNAL RADIATION TREATMT    9:00 AM   (15 min.)   [facility] RAD ONC SANIYA   Radiation Oncology Clinic 22     UMP EXTERNAL RADIATION TREATMT    9:00 AM   (15 min.)   [facility] RAD ONC SANIYA   Radiation Oncology Clinic 23     UMP EXTERNAL RADIATION TREATMT    9:00 AM   (15 min.)   [facility] RAD ONC SANIYA   Radiation Oncology Clinic 24       25     26     27     28     29     30     31 April 2018 Sunday Monday Tuesday Wednesday Thursday Friday Saturday   1     2      3     4     5     6     7       8     9     10     11     12     13     14       15     16     17     18     19     20     21       22     23     24     25     26     27     28       29     30                                           Lab Results:  Recent Results (from the past 12 hour(s))   CBC with platelets differential    Collection Time: 03/09/18 12:14 PM   Result Value Ref Range    WBC 2.1 (L) 4.0 - 11.0 10e9/L    RBC Count 3.87 3.8 - 5.2 10e12/L    Hemoglobin 12.1 11.7 - 15.7 g/dL    Hematocrit 35.7 35.0 - 47.0 %    MCV 92 78 - 100 fl    MCH 31.3 26.5 - 33.0 pg    MCHC 33.9 31.5 - 36.5 g/dL    RDW 13.8 10.0 - 15.0 %    Platelet Count 175 150 - 450 10e9/L    Diff Method Automated Method     % Neutrophils 59.8 %    % Lymphocytes 9.3 %    % Monocytes 28.5 %    % Eosinophils 1.4 %    % Basophils 0.5 %    % Immature Granulocytes 0.5 %    Nucleated RBCs 0 0 /100    Absolute Neutrophil 1.3 (L) 1.6 - 8.3 10e9/L    Absolute Lymphocytes 0.2 (L) 0.8 - 5.3 10e9/L    Absolute Monocytes 0.6 0.0 - 1.3 10e9/L    Absolute Eosinophils 0.0 0.0 - 0.7 10e9/L    Absolute Basophils 0.0 0.0 - 0.2 10e9/L    Abs Immature Granulocytes 0.0 0 - 0.4 10e9/L    Absolute Nucleated RBC 0.0     Poikilocytosis Slight     [place] Cells Slight     Platelet Estimate Confirming automated cell count    Comprehensive metabolic panel    Collection Time: 03/09/18 12:14 PM   Result Value Ref Range    Sodium 135 133 - 144 mmol/L    Potassium 3.1 (L) 3.4 - 5.3 mmol/L    Chloride 100 94 - 109 mmol/L    Carbon Dioxide 27 20 - 32 mmol/L    Anion Gap 8 3 - 14 mmol/L    Glucose 94 70 - 99 mg/dL    Urea Nitrogen 19 7 - 30 mg/dL    Creatinine 0.68 0.52 - 1.04 mg/dL    GFR Estimate 84 >60 mL/min/1.7m2    GFR Estimate If Black >90 >60 mL/min/1.7m2    Calcium 8.0 (L) 8.5 - 10.1 mg/dL    Bilirubin Total 1.0 0.2 - 1.3 mg/dL    Albumin 2.6 (L) 3.4 - 5.0 g/dL    Protein Total 5.2 (L) 6.8 - 8.8 g/dL    Alkaline Phosphatase 58 40 - 150 U/L    ALT 23 0 - 50 U/L    AST 29 0 -  45 U/L

## 2018-03-09 NOTE — Clinical Note
3/9/2018       RE: Elizabeth Taylor  [address]   [place] [address]     Dear Colleague,    Thank you for referring your patient, Elizabeth Taylor, to the [facility]. Please see a copy of my visit note below.      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 She underwent a colonoscopy in April 2017 which noted to have small anal fissure along with internal hemorrhoids. Symptoms continued and  she was referred to colorectal surgery for further evaluation. On exam she was found to have an 1.5 cm anal lesion on the left side along with left groin palpable adenopathy. Biopsy of anal mass came back positive for squamous cell carcinoma. Patient underwent staging workup with MRI pelvis and a PET scan- showed PET avid left anal canal mass along with small left inguinal FDG avid lymph nodes.     02/12/18- Started on concurrent chemoradiation with 5FU/Mitomycin    SUBJECTIVE     She is one-week follow-up reviewed developed diarrhea over the weekend was prescribed Imodium which has helped. She is trying her best to keep up with oral intake. Denies fever/chills, nausea/vomiting, abdominal pain or any other complaints         PAST MEDICAL HISTORY     Past Medical History:   Diagnosis Date     Endometriosis, site unspecified      Thyroiditis, unspecified     Thryoiditis-resolved         CURRENT OUTPATIENT MEDICATIONS     Current Outpatient Prescriptions   Medication Sig Dispense Refill     potassium chloride SA (KLOR-CON) 20 MEQ CR tablet Take 1 tablet (20 mEq) by mouth daily for 4 days  4 tablet 0     loperamide (IMODIUM A-D) 2 MG tablet Take 1 tablet (2 mg) by mouth 4 times daily as needed for diarrhea 20 tablet 0     vitamin B complex with vitamin C (VITAMIN  B COMPLEX) TABS tablet Take 1 tablet by mouth daily       Calcium Carbonate-Vitamin D (CALCIUM 500/VITAMIN D PO)        hydrocortisone (ANUSOL-HC) 2.5 % cream Place rectally 2 times daily 30 g 1     pramipexole (MIRAPEX) 0.125 MG tablet Take one tablet 2-3 hours before bedtime, may increase to 2 tablets after one week if needed 60 tablet 5     TYLENOL EXTRA STRENGTH OR 1 TABLET EVERY 4 HOURS AS NEEDED       lidocaine-prilocaine (EMLA) cream Apply topically as needed for moderate pain (Patient not taking: Reported on 3/9/2018) 5 g 1     magic mouthwash (ENTER INGREDIENTS IN COMMENTS) suspension Swish and spit 5-10 mL four times daily as needed (Patient not taking: Reported on 3/2/2018) 500 mL 1     LORazepam (ATIVAN) 0.5 MG tablet Take 1 tablet (0.5 mg) by mouth every 4 hours as needed (Anxiety, Nausea/Vomiting or Sleep) (Patient not taking: Reported on 3/2/2018) 30 tablet 1     prochlorperazine (COMPAZINE) 10 MG tablet Take 1 tablet (10 mg) by mouth every 6 hours as needed (Nausea/Vomiting) (Patient not taking: Reported on 3/9/2018) 30 tablet 1        ALLERGIES     Allergies   Allergen Reactions     Darvon [Propoxyphene]      Had reaction in teen years     Penicillins      As a child     Ketoconazole Rash        REVIEW OF SYSTEMS   As above in the HPI, o/w complete 12-point ROS was negative.     PHYSICAL EXAM   B/P: 131/82, T: 98.5, P: 78, R: 16  SpO2 Readings from Last 4 Encounters:   03/09/18 96%   03/09/18 96%   03/02/18 99%   02/24/18 99%     Wt Readings from Last 3 Encounters:   03/09/18 51.2 kg (112 lb 12.8 oz)   03/09/18 51.2 kg (112 lb 12.8 oz)   03/05/18 49.4 kg (109 lb)     GEN: NAD  EYES:PERRLA  Mouth/ENT: Oropharynx is clear. Mouth sores  NECK: no cervical or supraclavicular lymphadenopathy  LUNGS: clear bilaterally  CV: regular,  no murmurs, rubs, or gallops  ABDOMEN: soft, non-tender, non-distended, normal bowel sounds  EXT: warm, well perfused, no edema  NEURO: alert  SKIN: no rashes     LABORATORY AND IMAGING STUDIES     Lab Results   Component Value Date    WBC 1.7 03/02/2018     Lab Results   Component Value Date    RBC 4.02 03/02/2018     Lab Results   Component Value Date    HGB 12.6 03/02/2018     Lab Results   Component Value Date    HCT 38.0 03/02/2018     No components found for: MCT  Lab Results   Component Value Date    MCV 95 03/02/2018     Lab Results   Component Value Date    MCH 31.3 03/02/2018     Lab Results   Component Value Date    MCHC 33.2 03/02/2018     Lab Results   Component Value Date    RDW 13.2 03/02/2018     Lab Results   Component Value Date    PLT 92 03/02/2018       Recent Labs   Lab Test  03/02/18   0955  02/23/18   0935   NA  139  140   POTASSIUM  3.6  3.8   CHLORIDE  106  106   CO2  28  28   ANIONGAP  5  6   GLC  89  105*   BUN  15  18   CR  0.68  0.67   ELISE  8.7  8.9     IMPRESSION:   1. Significant wall thickening and mucosal hyperenhancement of the  rectum and sigmoid colon consistent with history of diarrheal illness,  possibly related to radiation therapy.  2. Mild free peritoneal fluid. Large IVC, hepatic and portal veins,  and periportal edema suggesting patient is volume up.        ASSESSMENT AND PLAN     77-year-old female with no significant past medical history who presented with complaints of bright red blood per rectum and was recently found to have an anal mass which on biopsy came back for positive for invasive squamous cell carcinoma. Staging workup showed a PET avid anal mass along with hypermetabolic left inguinal adenopathy.     - Squamous cell carcinoma Anal canal  T2N1- IIIA  Started on definitive chemoradiation with infusional FU 1000 mg/m2 on days 1 to 4 and 29 to 32, plus mitomycin 10 mg/m2 on days 1 and 29 (as tolerated), maximum 20 mg per dose- day 1 on 02/12/18  Day 29 scheduled  for 03/13/18. Will skip Mitomycin and proceed with 5FU at reduced dose given neutropenia/old age    - Mouth sores- Improved   Continue Magic mouthwash prn    - Neutropenia  Secondary to chemotherapy.    - Hypokalemia  Will replace today.   Prescrioption also given for 4 days     Hypotension/Decreased po intake  IV fluids today    Return to infusion on Tuesday to proceed with Day 29 of chemo. RTC with JOVANI the following week for toxicity check and in 4 weeks for follow up with me on 04/06/18.     Chart documentation with Dragon Voice recognition Software. Although reviewed after completion, some words and grammatical errors may remain.  Shen Ray MD  Attending Physician   Hematology/Medical Oncology    Again, thank you for allowing me to participate in the care of your patient.      Sincerely,    Shen Ray MD

## 2018-03-09 NOTE — MR AVS SNAPSHOT
After Visit Summary   3/9/2018    Elizabeth Taylor    MRN: [id number]           Patient Information     Date Of Birth          1940        Visit Information        Provider Department      3/9/2018 1:00 PM  18 ATC;  ONCOLOGY INFUSION [facility]        Today's Diagnoses     Malignant neoplasm of anal canal (H)    -  1      Care Instructions    Contact Numbers    [facility] Main Line: [phone]  [facility] Triage:  [phone]    Call triage with chills and/or temperature greater than or equal to 100.5, uncontrolled nausea/vomiting, diarrhea, constipation, dizziness, shortness of breath, chest pain, bleeding, unexplained bruising, or any new/concerning symptoms, questions/concerns.     If you are having any concerning symptoms or wish to speak to a provider before your next infusion visit, please call your care coordinator or triage to notify them so we can adequately serve you.       After Hours: [phone]    If after hours, weekends, or holidays, call main hospital  and ask for Oncology doctor on call.         March 2018 Sunday Monday Tuesday Wednesday Thursday Friday Saturday                       1     [facility] EXTERNAL RADIATION TREATMT    9:00 AM   (15 min.)   [facility] RAD ONC SAN[facility]YA   Radiation Oncology Clinic 2     [facility] EXTERNAL RADIATION TREATMT    9:00 AM   (15 min.)   [facility] RAD ONC SANIYA   Radiation Oncology Clinic     [facility] LAB DRAW    9:15 AM   (15 min.)   [facility]   [facility] Lab Draw     P RETURN    9:45 AM   (30 min.)   Shen Ray MD   [facility] ONC INFUSION 120   10:30 AM   (120 min.)   [facility] ONCOLOGY INFUSION   [facility] 3       4     5     [facility] EXTERNAL RADIATION TREATMT    9:00 AM   (15 min.)   [facility] RAD ONC SANIYA   Radiation Oncology Clinic     [facility] ON TREATMENT VISIT    9:15 AM   (15 min.)   Zaire Madison MD   Radiation Oncology Clinic 6     [facility] EXTERNAL RADIATION TREATMT    9:00  AM   (15 min.)   [facility] RAD ONC [facility]   Radiation Oncology Clinic 7     [facility] EXTERNAL RADIATION TREATMT    9:00 AM   (15 min.)   [facility] RAD ONC [facility]   Radiation Oncology Clinic     CT ABDOMEN PELVIS W    9:25 AM   (20 min.)   [id number]   [facility] [place], [place] 8     [facility] EXTERNAL RADIATION TREATMT    9:00 AM   (15 min.)   P RAD ONC SANIYA   Radiation Oncology Clinic     UMP NEW    9:30 AM   (60 min.)   June Alonso RD   [facility] 9     [facility] EXTERNAL RADIATION TREATMT    9:00 AM   (15 min.)   P RAD ONC SANIYA   Radiation Oncology Clinic     [facility] LAB DRAW   12:00 PM   (15 min.)   [facility] LAB DRAW   [facility] Lab Draw     [facility] RETURN   12:15 PM   (30 min.)   Shen Ray MD   [facility]     [facility] ONC INFUSION 120    1:00 PM   (120 min.)    ONCOLOGY INFUSION   [facility] 10       11     12     [facility] EXTERNAL RADIATION TREATMT    9:00 AM   (15 min.)   P RAD ONC SANIYA   Radiation Oncology Clinic 13     [facility] EXTERNAL RADIATION TREATMT    9:00 AM   (15 min.)   P RAD ONC SANIYA   Radiation Oncology Clinic 14     [facility] EXTERNAL RADIATION TREATMT    9:00 AM   (15 min.)   P RAD ONC SANIYA   Radiation Oncology Clinic 15     [facility] EXTERNAL RADIATION TREATMT    9:00 AM   (15 min.)   P RAD ONC SANIYA   Radiation Oncology Clinic     [facility] ON TREATMENT VISIT    9:15 AM   (15 min.)   Zaire Madison MD   Radiation Oncology Clinic 16     [facility] EXTERNAL RADIATION TREATMT    9:00 AM   (15 min.)   [facility] RAD ONC [facility]YA   Radiation Oncology Clinic 17       18     19     [facility] EXTERNAL RADIATION TREATMT    9:00 AM   (15 min.)   [facility] RAD ONC [facility]   Radiation Oncology Clinic     [facility] ON TREATMENT VISIT    9:15 AM   (15 min.)   Zaire Madison MD   Radiation Oncology Clinic 20     [facility] EXTERNAL RADIATION TREATMT    9:00 AM   (15 min.)   P RAD ONC SANIYA   Radiation Oncology Clinic 21     [facility] EXTERNAL RADIATION TREATMT    9:00 AM   (15 min.)   [facility] RAD ONC [facility]IYA    Radiation Oncology Clinic 22     [facility] EXTERNAL RADIATION TREATMT    9:00 AM   (15 min.)   [facility] RAD ONC SANIYA   Radiation Oncology Clinic 23     [facility] EXTERNAL RADIATION TREATMT    9:00 AM   (15 min.)   [facility] RAD ONC SANIYA   Radiation Oncology Clinic 24       25     26     27     28     29     30     31 April 2018 Sunday Monday Tuesday Wednesday Thursday Friday Saturday   1     2     3     4     5     6     7       8     9     10     11     12     13     14       15     16     17     18     19     20     21       22     23     24     25     26     27     28       29     30                                           Lab Results:  Recent Results (from the past 12 hour(s))   CBC with platelets differential    Collection Time: 03/09/18 12:14 PM   Result Value Ref Range    WBC 2.1 (L) 4.0 - 11.0 10e9/L    RBC Count 3.87 3.8 - 5.2 10e12/L    Hemoglobin 12.1 11.7 - 15.7 g/dL    Hematocrit 35.7 35.0 - 47.0 %    MCV 92 78 - 100 fl    MCH 31.3 26.5 - 33.0 pg    MCHC 33.9 31.5 - 36.5 g/dL    RDW 13.8 10.0 - 15.0 %    Platelet Count 175 150 - 450 10e9/L    Diff Method Automated Method     % Neutrophils 59.8 %    % Lymphocytes 9.3 %    % Monocytes 28.5 %    % Eosinophils 1.4 %    % Basophils 0.5 %    % Immature Granulocytes 0.5 %    Nucleated RBCs 0 0 /100    Absolute Neutrophil 1.3 (L) 1.6 - 8.3 10e9/L    Absolute Lymphocytes 0.2 (L) 0.8 - 5.3 10e9/L    Absolute Monocytes 0.6 0.0 - 1.3 10e9/L    Absolute Eosinophils 0.0 0.0 - 0.7 10e9/L    Absolute Basophils 0.0 0.0 - 0.2 10e9/L    Abs Immature Granulocytes 0.0 0 - 0.4 10e9/L    Absolute Nucleated RBC 0.0     Poikilocytosis Slight     Lewis Cells Slight     Platelet Estimate Confirming automated cell count    Comprehensive metabolic panel    Collection Time: 03/09/18 12:14 PM   Result Value Ref Range    Sodium 135 133 - 144 mmol/L    Potassium 3.1 (L) 3.4 - 5.3 mmol/L    Chloride 100 94 - 109 mmol/L    Carbon Dioxide 27 20 - 32 mmol/L    Anion Gap 8 3 - 14 mmol/L     Glucose 94 70 - 99 mg/dL    Urea Nitrogen 19 7 - 30 mg/dL    Creatinine 0.68 0.52 - 1.04 mg/dL    GFR Estimate 84 >60 mL/min/1.7m2    GFR Estimate If Black >90 >60 mL/min/1.7m2    Calcium 8.0 (L) 8.5 - 10.1 mg/dL    Bilirubin Total 1.0 0.2 - 1.3 mg/dL    Albumin 2.6 (L) 3.4 - 5.0 g/dL    Protein Total 5.2 (L) 6.8 - 8.8 g/dL    Alkaline Phosphatase 58 40 - 150 U/L    ALT 23 0 - 50 U/L    AST 29 0 - 45 U/L               Follow-ups after your visit        Your next 10 appointments already scheduled     Mar 12, 2018  9:00 AM CDT   EXTERNAL RADIATION TREATMENT with [facility] RAD ONC SANIYA   Radiation Oncology Clinic ([facility])    [facility]  1st Floor  [address] [facility] [address]   [phone]            Mar 13, 2018  9:00 AM CDT   EXTERNAL RADIATION TREATMENT with [facility] RAD ONC SANIYA   Radiation Oncology Clinic ([facility])    [facility]  1st Floor  [address] [facility] [address]   [phone]            Mar 14, 2018  9:00 AM CDT   EXTERNAL RADIATION TREATMENT with [facility] RAD ONC SANIYA   Radiation Oncology Clinic ([facility])    [facility]  1st Floor  [address] [facility] [address]   [phone]            Mar 15, 2018  9:00 AM CDT   EXTERNAL RADIATION TREATMENT with [facility] RAD ONC SANIYA   Radiation Oncology Clinic ([facility])    [facility]  1st Floor  [address] [facility] [address] [facility] [address]   [phone]            Mar 15, 2018  9:15 AM CDT   ON TREATMENT VISIT with Zaire Madison MD   Radiation Oncology Clinic ([facility])    [facility]  1st Floor  [address] [facility] [address]   [phone]            Mar 16, 2018  9:00 AM CDT   EXTERNAL RADIATION TREATMENT with [facility] RAD ONC SANIYA   Radiation Oncology Clinic ([facility])    [facility]  1st Floor  [address]  [facility] [address]   [phone]            Mar 19, 2018  9:00 AM CDT   EXTERNAL RADIATION TREATMENT with [facility] RAD ONC SANIYA   Radiation Oncology Clinic ([facility])    [facility]  1st Floor  [address] [facility] [address]   [phone]            Mar 19, 2018  9:15 AM CDT   ON TREATMENT VISIT with Zaire Madison MD   Radiation Oncology Clinic ([facility])    [facility]  1st Floor  [address] [facility] [address]   [phone]            Mar 20, 2018  9:00 AM CDT   EXTERNAL RADIATION TREATMENT with [facility] RAD ONC SANIYA   Radiation Oncology Clinic ([facility])    [facility]  1st Floor  [address] [facility] [address]   [phone]            Mar 21, 2018  9:00 AM CDT   EXTERNAL RADIATION TREATMENT with [facility] RAD ONC SANIYA   Radiation Oncology Clinic ([facility])    [facility]  1st Floor  [address] [facility] [address]   [phone]              Who to contact     If you have questions or need follow up information about today's clinic visit or your schedule please contact [facility] directly at [phone].  Normal or non-critical lab and imaging results will be communicated to you by [employer], letter or phone within 4 business days after the clinic has received the results. If you do not hear from us within 7 days, please contact the clinic through [employer] or phone. If you have a critical or abnormal lab result, we will notify you by phone as soon as possible.  Submit refill requests through [employer] or call your pharmacy and they will forward the refill request to us. Please allow 3 business days for your refill to be completed.          Additional Information About Your Visit        [employer] Information     [employer] gives you secure access to your electronic health record. If you see a  primary care provider, you can also send messages to your care team and make appointments. If you have questions, please call your primary care clinic.  If you do not have a primary care provider, please call [phone] and they will assist you.        Care EveryWhere ID     This is your Care EveryWhere ID. This could be used by other organizations to access your [facility] medical records  [id number]        Your Vitals Were     Pulse Temperature Respirations Pulse Oximetry BMI (Body Mass Index)       89 98.3  F (36.8  C) 18 96% 19.52 kg/m2        Blood Pressure from Last 3 Encounters:   03/09/18 (!) 89/66 03/09/18 (!) 89/66 03/02/18 112/67    Weight from Last 3 Encounters:   03/09/18 51.2 kg (112 lb 12.8 oz)   03/09/18 51.2 kg (112 lb 12.8 oz)   03/05/18 49.4 kg (109 lb)              We Performed the Following     CBC with platelets differential     Comprehensive metabolic panel          Today's Medication Changes          These changes are accurate as of 3/9/18  2:50 PM.  If you have any questions, ask your nurse or doctor.               Start taking these medicines.        Dose/Directions    potassium chloride SA 20 MEQ CR tablet   Commonly known as:  KLOR-CON   Used for:  Malignant neoplasm of anal canal (H)   Started by:  Shen Ray MD        Dose:  20 mEq   Take 1 tablet (20 mEq) by mouth daily for 4 days   Quantity:  4 tablet   Refills:  0            Where to get your medicines      These medications were sent to [facility] - [place], [place] - [address]  [address], [place] [address]     Phone:  [phone]     potassium chloride SA 20 MEQ CR tablet                Primary Care Provider Office Phone # Fax #    Baron Seth [phone]       [address] [facility] [address]        Equal Access to Services     IONA DALEY AH: Josué ledezma Somushtaq, waaxda luqadaha, qaybta kaalmada adeargentinayaleland, cyndy catalan. So [facility]  [phone].    ATENCIÓN: Si cristian sadler, tiene a ghosh disposición servicios gratuitos de asistencia lingüística. Jessica hoyt [phone].    We comply with applicable federal civil rights laws and [place] laws. We do not discriminate on the basis of race, color, national origin, age, disability, sex, sexual orientation, or gender identity.            Thank you!     Thank you for choosing [facility]  for your care. Our goal is always to provide you with excellent care. Hearing back from our patients is one way we can continue to improve our services. Please take a few minutes to complete the written survey that you may receive in the mail after your visit with us. Thank you!             Your Updated Medication List - Protect others around you: Learn how to safely use, store and throw away your medicines at www.disposemymeds.org.          This list is accurate as of 3/9/18  2:50 PM.  Always use your most recent med list.                   Brand Name Dispense Instructions for use Diagnosis    CALCIUM 500/VITAMIN D PO           hydrocortisone 2.5 % cream    ANUSOL-HC    30 g    Place rectally 2 times daily    External hemorrhoids       lidocaine-prilocaine cream    EMLA    5 g    Apply topically as needed for moderate pain    Malignant neoplasm of anal canal (H)       loperamide 2 MG tablet    IMODIUM A-D    20 tablet    Take 1 tablet (2 mg) by mouth 4 times daily as needed for diarrhea    Malignant neoplasm of anal canal (H)       LORazepam 0.5 MG tablet    ATIVAN    30 tablet    Take 1 tablet (0.5 mg) by mouth every 4 hours as needed (Anxiety, Nausea/Vomiting or Sleep)    Malignant neoplasm of anal canal (H)       magic mouthwash suspension    ENTER INGREDIENTS IN COMMENTS    500 mL    Swish and spit 5-10 mL four times daily as needed    Malignant neoplasm of anal canal (H)       potassium chloride SA 20 MEQ CR tablet    KLOR-CON    4 tablet    Take 1 tablet (20 mEq) by mouth daily for 4 days     Malignant neoplasm of anal canal (H)       pramipexole 0.125 MG tablet    MIRAPEX    60 tablet    Take one tablet 2-3 hours before bedtime, may increase to 2 tablets after one week if needed    Restless leg syndrome       prochlorperazine 10 MG tablet    COMPAZINE    30 tablet    Take 1 tablet (10 mg) by mouth every 6 hours as needed (Nausea/Vomiting)    Malignant neoplasm of anal canal (H)       TYLENOL EXTRA STRENGTH PO      1 TABLET EVERY 4 HOURS AS NEEDED        vitamin B complex with vitamin C Tabs tablet      Take 1 tablet by mouth daily

## 2018-03-09 NOTE — NURSING NOTE
Port accessed by RN, pt tolerated well. Labs collected and sent, line flushed with NS and heparin. Vitals taken and pt checked in for next appt.

## 2018-03-09 NOTE — NURSING NOTE
"Oncology Rooming Note    March 9, 2018 12:24 PM   Elizabeth Taylor is a 77 year old female who presents for:    Chief Complaint   Patient presents with     Oncology Clinic Visit     return patient visit for follow up related to anal cancer      Initial Vitals: BP (!) 89/66  Pulse 89  Temp 98.3  F (36.8  C) (Oral)  Resp 16  Ht 1.619 m (5' 3.74\")  Wt 51.2 kg (112 lb 12.8 oz)  SpO2 96%  Breastfeeding? No  BMI 19.52 kg/m2 Estimated body mass index is 19.52 kg/(m^2) as calculated from the following:    Height as of this encounter: 1.619 m (5' 3.74\").    Weight as of this encounter: 51.2 kg (112 lb 12.8 oz). Body surface area is 1.52 meters squared.  No Pain (0) Comment: Data Unavailable   No LMP recorded. Patient is postmenopausal.  Allergies reviewed: Yes  Medications reviewed: Yes    Medications: Medication refills not needed today.  Pharmacy name entered into [facility]: [facility] - [place], [place] - [address]    Clinical concerns: no concerns dr. puga was notified.    6 minutes for nursing intake (face to face time)     Alec Espinoza CMA              "

## 2018-03-09 NOTE — LETTER
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 She underwent a colonoscopy in April 2017 which noted to have small anal fissure along with internal hemorrhoids. Symptoms continued and  she was referred to colorectal surgery for further evaluation. On exam she was found to have an 1.5 cm anal lesion on the left side along with left groin palpable adenopathy. Biopsy of anal mass came back positive for squamous cell carcinoma. Patient underwent staging workup with MRI pelvis and a PET scan- showed PET avid left anal canal mass along with small left inguinal FDG avid lymph nodes.     02/12/18- Started on concurrent chemoradiation with 5FU/Mitomycin    SUBJECTIVE     She is one-week follow-up reviewed developed diarrhea over the weekend was prescribed Imodium which has helped. She is trying her best to keep up with oral intake. Denies fever/chills, nausea/vomiting, abdominal pain or any other complaints         PAST MEDICAL HISTORY     Past Medical History:   Diagnosis Date     Endometriosis, site unspecified      Thyroiditis, unspecified     Thryoiditis-resolved         CURRENT OUTPATIENT MEDICATIONS     Current Outpatient Prescriptions   Medication Sig Dispense Refill     potassium chloride SA (KLOR-CON) 20 MEQ CR tablet Take 1 tablet (20 mEq) by mouth daily for 4 days 4 tablet 0     loperamide (IMODIUM A-D) 2 MG tablet Take 1 tablet (2 mg) by mouth 4 times daily as needed for diarrhea 20 tablet 0     vitamin B complex with  vitamin C (VITAMIN  B COMPLEX) TABS tablet Take 1 tablet by mouth daily       Calcium Carbonate-Vitamin D (CALCIUM 500/VITAMIN D PO)        hydrocortisone (ANUSOL-HC) 2.5 % cream Place rectally 2 times daily 30 g 1     pramipexole (MIRAPEX) 0.125 MG tablet Take one tablet 2-3 hours before bedtime, may increase to 2 tablets after one week if needed 60 tablet 5     TYLENOL EXTRA STRENGTH OR 1 TABLET EVERY 4 HOURS AS NEEDED       lidocaine-prilocaine (EMLA) cream Apply topically as needed for moderate pain (Patient not taking: Reported on 3/9/2018) 5 g 1     magic mouthwash (ENTER INGREDIENTS IN COMMENTS) suspension Swish and spit 5-10 mL four times daily as needed (Patient not taking: Reported on 3/2/2018) 500 mL 1     LORazepam (ATIVAN) 0.5 MG tablet Take 1 tablet (0.5 mg) by mouth every 4 hours as needed (Anxiety, Nausea/Vomiting or Sleep) (Patient not taking: Reported on 3/2/2018) 30 tablet 1     prochlorperazine (COMPAZINE) 10 MG tablet Take 1 tablet (10 mg) by mouth every 6 hours as needed (Nausea/Vomiting) (Patient not taking: Reported on 3/9/2018) 30 tablet 1        ALLERGIES     Allergies   Allergen Reactions     Darvon [Propoxyphene]      Had reaction in teen years     Penicillins      As a child     Ketoconazole Rash        REVIEW OF SYSTEMS   As above in the HPI, o/w complete 12-point ROS was negative.     PHYSICAL EXAM   B/P: 131/82, T: 98.5, P: 78, R: 16  SpO2 Readings from Last 4 Encounters:   03/09/18 96%   03/09/18 96%   03/02/18 99%   02/24/18 99%     Wt Readings from Last 3 Encounters:   03/09/18 51.2 kg (112 lb 12.8 oz)   03/09/18 51.2 kg (112 lb 12.8 oz)   03/05/18 49.4 kg (109 lb)     GEN: NAD  EYES:PERRLA  Mouth/ENT: Oropharynx is clear. Mouth sores  NECK: no cervical or supraclavicular lymphadenopathy  LUNGS: clear bilaterally  CV: regular, no murmurs, rubs, or gallops  ABDOMEN: soft, non-tender, non-distended, normal bowel sounds  EXT: warm, well perfused, no edema  NEURO: alert  SKIN: no  radha     LABORATORY AND IMAGING STUDIES     Lab Results   Component Value Date    WBC 1.7 03/02/2018     Lab Results   Component Value Date    RBC 4.02 03/02/2018     Lab Results   Component Value Date    HGB 12.6 03/02/2018     Lab Results   Component Value Date    HCT 38.0 03/02/2018     No components found for: MCT  Lab Results   Component Value Date    MCV 95 03/02/2018     Lab Results   Component Value Date    MCH 31.3 03/02/2018     Lab Results   Component Value Date    MCHC 33.2 03/02/2018     Lab Results   Component Value Date    RDW 13.2 03/02/2018     Lab Results   Component Value Date    PLT 92 03/02/2018       Recent Labs   Lab Test  03/02/18   0955  02/23/18   0935   NA  139  140   POTASSIUM  3.6  3.8   CHLORIDE  106  106   CO2  28  28   ANIONGAP  5  6   GLC  89  105*   BUN  15  18   CR  0.68  0.67   ELISE  8.7  8.9     IMPRESSION:   1. Significant wall thickening and mucosal hyperenhancement of the  rectum and sigmoid colon consistent with history of diarrheal illness,  possibly related to radiation therapy.  2. Mild free peritoneal fluid. Large IVC, hepatic and portal veins,  and periportal edema suggesting patient is volume up.        ASSESSMENT AND PLAN     77-year-old female with no significant past medical history who presented with complaints of bright red blood per rectum and was recently found to have an anal mass which on biopsy came back for positive for invasive squamous cell carcinoma. Staging workup showed a PET avid anal mass along with hypermetabolic left inguinal adenopathy.     - Squamous cell carcinoma Anal canal  T2N1- IIIA  Started on definitive chemoradiation with infusional FU 1000 mg/m2 on days 1 to 4 and 29 to 32, plus mitomycin 10 mg/m2 on days 1 and 29 (as tolerated), maximum 20 mg per dose- day 1 on 02/12/18  Day 29 scheduled for 03/13/18. Will skip Mitomycin and proceed with 5FU at reduced dose given neutropenia/old age    - Mouth sores- Improved   Continue Magic mouthwash  prn    - Neutropenia  Secondary to chemotherapy.    - Hypokalemia  Will replace today.   Prescrioption also given for 4 days     Hypotension/Decreased po intake  IV fluids today    Return to infusion on Tuesday to proceed with Day 29 of chemo. RTC with JOVANI the following week for toxicity check and in 4 weeks for follow up with me on 04/06/18.     Chart documentation with Dragon Voice recognition Software. Although reviewed after completion, some words and grammatical errors may remain.  Shen Ray MD  Attending Physician   Hematology/Medical Oncology

## 2018-03-11 ENCOUNTER — NURSE TRIAGE (OUTPATIENT)
Dept: NURSING | Facility: CLINIC | Age: 78
End: 2018-03-11

## 2018-03-11 NOTE — TELEPHONE ENCOUNTER
"  Reason for Disposition    [1] MILD (e.g., 1-3 or more stools than normal in past 24 hours) diarrhea without known cause AND [2] present >  7 days     \"I have had diarrhea since last week. I talked to my doctor last Jaycob 3/4. I was started on loperamide (IMODIUM A-D) 2 MG tablet 20 tablet 0 3/4/2018  --  Sig: Take 1 tablet (2 mg) by mouth 4 times daily as needed for diarrhea    It was working fine(see OV on 3/9) but now the diarrhea is back again. I got OTC Imodium but it's not working. \" I compared the dosing, it is the same thing. Patient states she's had diarrhea 3 times in 24 hrs. The last time was a \"very small amount\". Denies abdominal pain or cramping, denies blood in stools or fever. She is eating and drinking normally. Normal urination. Triaged , gave signs of dehydration. Call PCP in am to discuss. Call back if needed.    Additional Information    Negative: Shock suspected (e.g., cold/pale/clammy skin, too weak to stand, low BP, rapid pulse)    Negative: Difficult to awaken or acting confused  (e.g., disoriented, slurred speech)    Negative: Sounds like a life-threatening emergency to the triager    Negative: Vomiting also present and worse than the diarrhea    Negative: [1] Blood in stool AND [2] without diarrhea    Negative: [1] SEVERE abdominal pain (e.g., excruciating) AND [2] present > 1 hour    Negative: [1] SEVERE abdominal pain AND [2] age > 60    Negative: [1] Blood in the stool AND [2] moderate or large amount of blood    Negative: Black or tarry bowel movements  (Exception: chronic-unchanged  black-grey bowel movements AND is taking iron pills or Pepto-bismol)    Negative: [1] Drinking very little AND [2] dehydration suspected (e.g., no urine > 12 hours, very dry mouth, very lightheaded)    Negative: Patient sounds very sick or weak to the triager    Negative: [1] SEVERE diarrhea (e.g., 7 or more times / day more than normal) AND [2]  age > 60 years    Negative: [1] Constant abdominal pain " AND [2] present > 2 hours    Negative: [1] Fever > 103 F (39.4 C) AND [2] not able to get the fever down using Fever Care Advice    Negative: [1] SEVERE diarrhea (e.g., 7 or more times / day more than normal) AND [2] present > 24 hours (1 day)    Negative: [1] MODERATE diarrhea (e.g., 4-6 times / day more than normal) AND [2] present > 48 hours (2 days)    Negative: [1] MODERATE diarrhea (e.g., 4-6 times / day more than normal) AND [2] age > 70 years    Negative: Fever > 101 F (38.3 C)    Negative: Fever present > 3 days (72 hours)    Negative: Abdominal pain  (Exception: Pain clears with each passage of diarrhea stool)    Negative: [1] Blood in the stool AND [2] small amount of blood   (Exception: only on toilet paper. Reason: diarrhea can cause rectal irritation with blood on wiping)    Negative: [1] Mucus or pus in stool AND [2] present > 2 days AND [3] diarrhea is more than mild    Negative: [1] Recent antibiotic therapy (i.e., within last 2 months) AND [2] > 3 days since antibiotic was stopped    Negative: Weak immune system (e.g., HIV positive, cancer chemo, splenectomy, organ transplant, chronic steroids)    Negative: Tube feedings (e.g., nasogastric, g-tube, j-tube)    Negative: Travel to a foreign country in past month    Protocols used: DIARRHEA-ADULT-

## 2018-03-11 NOTE — TELEPHONE ENCOUNTER
Additional Information    Negative: [1] Caller is not with the adult (patient) AND [2] reporting urgent symptoms    Negative: Lab result questions    Negative: Medication questions    Negative: Caller cannot be reached by phone    Negative: Caller has already spoken to PCP or another triager    Negative: RN needs further essential information from caller in order to complete triage    Negative: Requesting regular office appointment    Negative: [1] Caller requesting NON-URGENT health information AND [2] PCP's office is the best resource    Health Information question, no triage required and triager able to answer question     Friend Katherine calling , not currently with Elizabeth, regarding RX for Imodium AD per epic and diarrhea. I advised to have patient call us directly for triage if needed. Caller agrees.    Protocols used: [facility]

## 2018-03-12 ENCOUNTER — APPOINTMENT (OUTPATIENT)
Dept: RADIATION ONCOLOGY | Facility: CLINIC | Age: 78
End: 2018-03-12
Attending: RADIOLOGY
Payer: MEDICARE

## 2018-03-12 ENCOUNTER — HOSPITAL ENCOUNTER (OUTPATIENT)
Dept: NUTRITION | Facility: CLINIC | Age: 78
End: 2018-03-12
Attending: RADIOLOGY
Payer: MEDICARE

## 2018-03-12 DIAGNOSIS — C21.1 MALIGNANT NEOPLASM OF ANAL CANAL (H): ICD-10-CM

## 2018-03-12 PROCEDURE — 77386 ZZH IMRT TREATMENT DELIVERY, COMPLEX: CPT | Performed by: RADIOLOGY

## 2018-03-12 RX ORDER — LOPERAMIDE HYDROCHLORIDE 2 MG/1
2 TABLET ORAL 4 TIMES DAILY PRN
Qty: 30 TABLET | Refills: 1 | Status: SHIPPED | OUTPATIENT
Start: 2018-03-12 | End: 2018-03-15

## 2018-03-12 NOTE — PROGRESS NOTES
Nutrition Services:     Met with patient today after radiation 2/2 ongoing diarrhea.    See full assessment from 3/8/18.  She reports that her intake remains good.   She has started to drink Kefir which may be contributing to her diarrhea.  She is also eating apples.     Advised pt to avoid Kefir as this can cause loose stools.  Also informed pt to avoid all probiotics until she has completed chemotherapy 2/2 higher risk of food borne illness if she were to become neutropenic.     Reviewed BRAT diet with patient.  Advised her to avoid apples with skins    Advised pt to call with further questions prn.     June Cooper RD, LD  [facility]  [phone]  Pager: [phone]

## 2018-03-13 ENCOUNTER — APPOINTMENT (OUTPATIENT)
Dept: RADIATION ONCOLOGY | Facility: CLINIC | Age: 78
End: 2018-03-13
Attending: RADIOLOGY
Payer: MEDICARE

## 2018-03-13 ENCOUNTER — INFUSION THERAPY VISIT (OUTPATIENT)
Dept: ONCOLOGY | Facility: CLINIC | Age: 78
End: 2018-03-13
Attending: INTERNAL MEDICINE
Payer: MEDICARE

## 2018-03-13 ENCOUNTER — APPOINTMENT (OUTPATIENT)
Dept: LAB | Facility: CLINIC | Age: 78
End: 2018-03-13
Attending: INTERNAL MEDICINE
Payer: MEDICARE

## 2018-03-13 VITALS
SYSTOLIC BLOOD PRESSURE: 107 MMHG | DIASTOLIC BLOOD PRESSURE: 60 MMHG | TEMPERATURE: 97.8 F | BODY MASS INDEX: 19.91 KG/M2 | RESPIRATION RATE: 16 BRPM | HEIGHT: 64 IN | OXYGEN SATURATION: 98 % | WEIGHT: 116.6 LBS | HEART RATE: 90 BPM

## 2018-03-13 DIAGNOSIS — C21.1 MALIGNANT NEOPLASM OF ANAL CANAL (H): Primary | ICD-10-CM

## 2018-03-13 LAB
ALBUMIN SERPL-MCNC: 2.2 G/DL (ref 3.4–5)
ALP SERPL-CCNC: 56 U/L (ref 40–150)
ALT SERPL W P-5'-P-CCNC: 19 U/L (ref 0–50)
ANION GAP SERPL CALCULATED.3IONS-SCNC: 9 MMOL/L (ref 3–14)
AST SERPL W P-5'-P-CCNC: 22 U/L (ref 0–45)
BASOPHILS # BLD AUTO: 0 10E9/L (ref 0–0.2)
BASOPHILS NFR BLD AUTO: 1.2 %
BILIRUB SERPL-MCNC: 1.1 MG/DL (ref 0.2–1.3)
BUN SERPL-MCNC: 14 MG/DL (ref 7–30)
CALCIUM SERPL-MCNC: 8 MG/DL (ref 8.5–10.1)
CHLORIDE SERPL-SCNC: 101 MMOL/L (ref 94–109)
CO2 SERPL-SCNC: 24 MMOL/L (ref 20–32)
CREAT SERPL-MCNC: 0.7 MG/DL (ref 0.52–1.04)
DIFFERENTIAL METHOD BLD: ABNORMAL
EOSINOPHIL # BLD AUTO: 0 10E9/L (ref 0–0.7)
EOSINOPHIL NFR BLD AUTO: 0.3 %
ERYTHROCYTE [DISTWIDTH] IN BLOOD BY AUTOMATED COUNT: 14.4 % (ref 10–15)
GFR SERPL CREATININE-BSD FRML MDRD: 81 ML/MIN/1.7M2
GLUCOSE SERPL-MCNC: 84 MG/DL (ref 70–99)
HCT VFR BLD AUTO: 35.6 % (ref 35–47)
HGB BLD-MCNC: 12.2 G/DL (ref 11.7–15.7)
IMM GRANULOCYTES # BLD: 0 10E9/L (ref 0–0.4)
IMM GRANULOCYTES NFR BLD: 0.6 %
LYMPHOCYTES # BLD AUTO: 0.3 10E9/L (ref 0.8–5.3)
LYMPHOCYTES NFR BLD AUTO: 7.2 %
MCH RBC QN AUTO: 31.5 PG (ref 26.5–33)
MCHC RBC AUTO-ENTMCNC: 34.3 G/DL (ref 31.5–36.5)
MCV RBC AUTO: 92 FL (ref 78–100)
MONOCYTES # BLD AUTO: 0.6 10E9/L (ref 0–1.3)
MONOCYTES NFR BLD AUTO: 17.9 %
NEUTROPHILS # BLD AUTO: 2.5 10E9/L (ref 1.6–8.3)
NEUTROPHILS NFR BLD AUTO: 72.8 %
NRBC # BLD AUTO: 0 10*3/UL
NRBC BLD AUTO-RTO: 0 /100
PLATELET # BLD AUTO: 188 10E9/L (ref 150–450)
POTASSIUM SERPL-SCNC: 4 MMOL/L (ref 3.4–5.3)
PROT SERPL-MCNC: 4.9 G/DL (ref 6.8–8.8)
RBC # BLD AUTO: 3.87 10E12/L (ref 3.8–5.2)
SODIUM SERPL-SCNC: 134 MMOL/L (ref 133–144)
WBC # BLD AUTO: 3.5 10E9/L (ref 4–11)

## 2018-03-13 PROCEDURE — 96374 THER/PROPH/DIAG INJ IV PUSH: CPT | Mod: 59

## 2018-03-13 PROCEDURE — 85025 COMPLETE CBC W/AUTO DIFF WBC: CPT | Performed by: INTERNAL MEDICINE

## 2018-03-13 PROCEDURE — G0498 CHEMO EXTEND IV INFUS W/PUMP: HCPCS

## 2018-03-13 PROCEDURE — 80053 COMPREHEN METABOLIC PANEL: CPT | Performed by: INTERNAL MEDICINE

## 2018-03-13 PROCEDURE — 77386 ZZH IMRT TREATMENT DELIVERY, COMPLEX: CPT | Performed by: RADIOLOGY

## 2018-03-13 PROCEDURE — 25000128 H RX IP 250 OP 636: Mod: ZF | Performed by: INTERNAL MEDICINE

## 2018-03-13 RX ADMIN — DEXAMETHASONE SODIUM PHOSPHATE 12 MG: 10 INJECTION, SOLUTION INTRAMUSCULAR; INTRAVENOUS at 10:46

## 2018-03-13 ASSESSMENT — PAIN SCALES - GENERAL: PAINLEVEL: NO PAIN (0)

## 2018-03-13 NOTE — PROGRESS NOTES
"Infusion Nursing Note:  Elizabeth Taylor presents today for fluorouracil 4 day infusion.    Patient seen by provider today: No   present during visit today: Not Applicable.    Note: patient c/o slight nausea and some pressure in upper left abdomen today, did not take anything.  Blisters on tongue have resolved.  Diarrhea has resolved.  Rectal bleeding is slight and occasional, patient unable to tell if this has improved.  She has bilateral LE edema which is ongoing.  She wears YUNIOR socks for this.  She states she feels off balance occasionally and is using family for ambulatory support today.     Intravenous Access:  Implanted Port.    Treatment Conditions:  Lab Results   Component Value Date    HGB 12.2 03/13/2018     Lab Results   Component Value Date    WBC 3.5 03/13/2018      Lab Results   Component Value Date    ANEU 2.5 03/13/2018     Lab Results   Component Value Date     03/13/2018      Lab Results   Component Value Date     03/13/2018                   Lab Results   Component Value Date    POTASSIUM 4.0 03/13/2018           No results found for: MAG         Lab Results   Component Value Date    CR 0.70 03/13/2018                   Lab Results   Component Value Date    ELISE 8.0 03/13/2018                Lab Results   Component Value Date    BILITOTAL 1.1 03/13/2018           Lab Results   Component Value Date    ALBUMIN 2.2 03/13/2018                    Lab Results   Component Value Date    ALT 19 03/13/2018           Lab Results   Component Value Date    AST 22 03/13/2018       Results reviewed, labs MET treatment parameters, ok to proceed with treatment.    Prior to discharge: Port is secured in place with tegaderm and flushed with 10cc NS with positive blood return noted.  Continuous home infusion CADD pump connected.    All connectors secured in place and clamps taped open and double checked with Marleni Melo RN.    Pump started, \"running\" noted on display (CADD): YES.  Patient " instructed to call our clinic or [facility] with any questions or concerns at home.  Patient verbalized understanding.    Patient set up for pump disconnect at our clinic on 03/17/2018 @ 2925.      Post Infusion Assessment:  Patient tolerated infusion without incident.  Blood return noted pre and post infusion.  Site patent and intact, free from redness, edema or discomfort.  No evidence of extravasations.    Discharge Plan:   Patient declined prescription refills.  Discharge instructions reviewed with: Patient and Family.  Patient and/or family verbalized understanding of discharge instructions and all questions answered.  AVS to patient via judo.  Patient will return 03/17/2018 for Fluorouracil infuser d/c and fluids appointment.  IB sent to scheduling pool to change time of arrival on 03/17/2018.  Patient discharged in stable condition accompanied by: self and family.  Departure Mode: Ambulatory.    Daisy Sharma RN

## 2018-03-13 NOTE — PATIENT INSTRUCTIONS
Contact Numbers  [facility] Nurse Triage: [phone]  After Hours Nurse Line:  [phone]    Please call the [facility] Nurse Triage line or after hours number if you experience a temperature greater than or equal to 100.5, shaking chills, have uncontrolled nausea, vomiting and/or diarrhea, dizziness, shortness of breath, chest pain, bleeding, unexplained bruising, or if you have any other new/concerning symptoms, questions or concerns.     If you are having any concerning symptoms or wish to speak to a provider before your next infusion visit, please call your care coordinator or triage to notify them so we can adequately serve you.     If you need a refill on a narcotic prescription or other medication, please call triage before your infusion appointment.           March 2018 Sunday Monday Tuesday Wednesday Thursday Friday Saturday                       1     P EXTERNAL RADIATION TREATMT    9:00 AM   (15 min.)   [facility] RAD ONC SANIYA   Radiation Oncology Clinic 2     [facility] EXTERNAL RADIATION TREATMT    9:00 AM   (15 min.)   [facility] RAD ONC SANIYA   Radiation Oncology Clinic     [facility] LAB DRAW    9:15 AM   (15 min.)   [facility] LAB DRAW   [facility] Lab Draw     [facility] RETURN    9:45 AM   (30 min.)   Shen Ray MD   [facility] ONC INFUSION 120   10:30 AM   (120 min.)    ONCOLOGY INFUSION   [facility] 3       4     5     [facility] EXTERNAL RADIATION TREATMT    9:00 AM   (15 min.)   [facility] RAD ONC SANIYA   Radiation Oncology [facility] ON TREATMENT VISIT    9:15 AM   (15 min.)   Zaire Madison MD   [facility] Oncology Clinic 6     P EXTERNAL RADIATION TREATMT    9:00 AM   (15 min.)   [facility] RAD ONC SANIYA   Radiation Oncology Clinic 7     P EXTERNAL RADIATION TREATMT    9:00 AM   (15 min.)   [facility] RAD ONC SANIYA   Radiation Oncology Clinic     CT ABDOMEN PELVIS W    9:25 AM   (20 min.)   [id number]   [facility] [place], [place] 8     P EXTERNAL RADIATION TREATMT     9:00 AM   (15 min.)   [facility] RAD ONC SANIYA   Radiation Oncology Clinic     [facility] NEW    9:30 AM   (60 min.)   June Alonso RD   [facility] 9     [facility] EXTERNAL RADIATION TREATMT    9:00 AM   (15 min.)   P RAD ONC SANIYA   Radiation Oncology Clinic     [facility] LAB DRAW   12:00 PM   (15 min.)    [facility] LAB DRAW   [facility] Lab Draw     [facility] RETURN   12:15 PM   (30 min.)   Shen Ray MD   [facility]    1:00 PM   (120 min.)   [facility] ON[facility]Y [facility]ION   [facility] 10       11     12     [facility] EXTERNAL RADIATION TREATMT    9:00 AM   (15 min.)   P RAD ONC SANIYA   Radiation Oncology Clinic     FOLLOW UP   10:00 AM   (30 min.)   June Alonso RD   [facility], [place], Nutrition Services 13     [facility] LAB DRAW    7:30 AM   (15 min.)    [facility] LAB DRAW   [facility] Lab Draw     [facility] ONC INFUSION 120    8:00 AM   (120 min.)   [facility] ONCOLOGY INFUSION   [facility]     [facility] EXTERNAL RADIATION TREATMT    9:00 AM   (15 min.)   P RAD ONC SANIYA   Radiation Oncology Clinic     [facility] ON TREATMENT VISIT    9:15 AM   (15 min.)   Zaire Madison MD   Radiation Oncology Clinic 14     [facility] EXTERNAL RADIATION TREATMT    9:00 AM   (15 min.)   P RAD ONC SANIYA   Radiation Oncology Clinic 15     [facility] EXTERNAL RADIATION TREATMT    9:00 AM   (15 min.)   P RAD ONC SANIYA   [facility] Oncology Clinic 16     [facility] EXTERNAL RADIATION TREATMT    9:00 AM   (15 min.)   P RAD ONC SANIYA   Radiation Oncology Clinic 17     [facility] ONC INFUSION 120   10:00 AM   (120 min.)   [facility] ONCOLOGY INFUSION   [facility]   18     19     [facility] EXTERNAL RADIATION TREATMT    9:00 AM   (15 min.)   P RAD ONC SANIYA   [facility] Oncology Clinic 20     [facility] EXTERNAL RADIATION TREATMT    9:00 AM   (15 min.)   P RAD ONC SANIYA   [facility] Oncology Clinic 21     [facility] EXTERNAL RADIATION TREATMT    9:00 AM   (15 min.)   P RAD  ONC SANIYA   Radiation Oncology Clinic 22     [facility] EXTERNAL RADIATION TREATMT    9:00 AM   (15 min.)   [facility] RAD ONC SANIYA   Radiation Oncology Clinic 23     [facility] EXTERNAL RADIATION TREATMT    9:00 AM   (15 min.)   [facility] RAD ONC SANIYA   Radiation Oncology Clinic 24       25     26     27     28     29     30     31 April 2018 Sunday Monday Tuesday Wednesday Thursday Friday Saturday   1     2     3     4     5     6     7       8     9     10     11     12     13     14       15     16     17     18     19     20     21       22     23     24     25     26     27     28       29     30                                           Lab Results:  Recent Results (from the past 12 hour(s))   CBC with platelets differential    Collection Time: 03/13/18  9:27 AM   Result Value Ref Range    WBC 3.5 (L) 4.0 - 11.0 10e9/L    RBC Count 3.87 3.8 - 5.2 10e12/L    Hemoglobin 12.2 11.7 - 15.7 g/dL    Hematocrit 35.6 35.0 - 47.0 %    MCV 92 78 - 100 fl    MCH 31.5 26.5 - 33.0 pg    MCHC 34.3 31.5 - 36.5 g/dL    RDW 14.4 10.0 - 15.0 %    Platelet Count 188 150 - 450 10e9/L    Diff Method Automated Method     % Neutrophils 72.8 %    % Lymphocytes 7.2 %    % Monocytes 17.9 %    % Eosinophils 0.3 %    % Basophils 1.2 %    % Immature Granulocytes 0.6 %    Nucleated RBCs 0 0 /100    Absolute Neutrophil 2.5 1.6 - 8.3 10e9/L    Absolute Lymphocytes 0.3 (L) 0.8 - 5.3 10e9/L    Absolute Monocytes 0.6 0.0 - 1.3 10e9/L    Absolute Eosinophils 0.0 0.0 - 0.7 10e9/L    Absolute Basophils 0.0 0.0 - 0.2 10e9/L    Abs Immature Granulocytes 0.0 0 - 0.4 10e9/L    Absolute Nucleated RBC 0.0    Comprehensive metabolic panel    Collection Time: 03/13/18  9:27 AM   Result Value Ref Range    Sodium 134 133 - 144 mmol/L    Potassium 4.0 3.4 - 5.3 mmol/L    Chloride 101 94 - 109 mmol/L    Carbon Dioxide 24 20 - 32 mmol/L    Anion Gap 9 3 - 14 mmol/L    Glucose 84 70 - 99 mg/dL    Urea Nitrogen 14 7 - 30 mg/dL    Creatinine 0.70 0.52 - 1.04 mg/dL    GFR  Estimate 81 >60 mL/min/1.7m2    GFR Estimate If Black >90 >60 mL/min/1.7m2    Calcium 8.0 (L) 8.5 - 10.1 mg/dL    Bilirubin Total 1.1 0.2 - 1.3 mg/dL    Albumin 2.2 (L) 3.4 - 5.0 g/dL    Protein Total 4.9 (L) 6.8 - 8.8 g/dL    Alkaline Phosphatase 56 40 - 150 U/L    ALT 19 0 - 50 U/L    AST 22 0 - 45 U/L

## 2018-03-13 NOTE — MR AVS SNAPSHOT
After Visit Summary   3/13/2018    Elizabeth Taylor    MRN: [id number]           Patient Information     Date Of Birth          1940        Visit Information        Provider Department      3/13/2018 8:00 AM  13 ATC;  ONCOLOGY INFUSION formerly [facility]        Today's Diagnoses     Malignant neoplasm of anal canal (H)    -  1      Care Instructions    Contact Numbers  [facility] Nurse Triage: [phone]  After Hours Nurse Line:  [phone]    Please call the [facility] Nurse Triage line or after hours number if you experience a temperature greater than or equal to 100.5, shaking chills, have uncontrolled nausea, vomiting and/or diarrhea, dizziness, shortness of breath, chest pain, bleeding, unexplained bruising, or if you have any other new/concerning symptoms, questions or concerns.     If you are having any concerning symptoms or wish to speak to a provider before your next infusion visit, please call your care coordinator or triage to notify them so we can adequately serve you.     If you need a refill on a narcotic prescription or other medication, please call triage before your infusion appointment.           March 2018 Sunday Monday Tuesday Wednesday Thursday Friday Saturday                       1     P EXTERNAL RADIATION TREATMT    9:00 AM   (15 min.)   [facility] RAD ONC SANIYA   Radiation Oncology Clinic 2     [facility] EXTERNAL RADIATION TREATMT    9:00 AM   (15 min.)   [facility] RAD ONC SANIYA   Radiation Oncology Clinic     [facility] LAB DRAW    9:15 AM   (15 min.)   [facility] LAB DRAW   [facility] Lab Draw     [facility] RETURN    9:45 AM   (30 min.)   Shen Ray MD   [facility] ONC INFUSION 120   10:30 AM   (120 min.)   [facility] ON[facility]   formerly [facility] 3       4     5     [facility] EXTERNAL RADIATION TREATMT    9:00 AM   (15 min.)   [facility] RAD ONC SANIYA   Radiation Oncology Clinic     [facility] ON TREATMENT VISIT    9:15 AM   (15  min.)   Zaire Madison MD   [facility] 6     [facility] EXTERNAL RADIATION TREATMT    9:00 AM   (15 min.)   P RAD ONC SANIYA   [facility] 7     [facility] EXTERNAL RADIATION TREATMT    9:00 AM   (15 min.)   [facility] RAD ONC SANIYA   Radiation Oncology Clinic     CT ABDOMEN PELVIS W    9:25 AM   (20 min.)   [id number]   [facility], [place], [place] 8     [facility] EXTERNAL RADIATION TREATMT    9:00 AM   (15 min.)   [facility] RAD ONC SANIYA   Radiation Oncology Clinic     [facility] NEW    9:30 AM   (60 min.)   June Alonso RD   [facility] 9     [facility] EXTERNAL RADIATION TREATMT    9:00 AM   (15 min.)   [facility] RAD ONC SANIYA   Radiation Oncology Clinic     [facility] DRAW   12:00 PM   (15 min.)    [facility] DRAW   [facility] Draw     [facility] RETURN   12:15 PM   (30 min.)   Shen Ray MD   [facility] ONC INFUSION 120    1:00 PM   (120 min.)   [facility] ONCOLOGY INFUSION   [facility] 10       11     12     [facility] EXTERNAL RADIATION TREATMT    9:00 AM   (15 min.)   [facility] RAD ONC SANIYA   Radiation Oncology Clinic     FOLLOW UP   10:00 AM   (30 min.)   June Alonso RD   [facility], [place], [facility] 13     [facility] LAB DRAW    7:30 AM   (15 min.)    [facility] DRAW   [facility] Draw     [facility] ONC INFUSION 120    8:00 AM   (120 min.)   [facility]    9[facility]00 AM   (15 min.)   [facility] RAD ONC [facility]IYA   Radiation Oncology Clinic     [facility] ON TREATMENT VISIT    9:15 AM   (15 min.)   Zaire Madison MD   [facility] 14     [facility] EXTERNAL RADIATION TREATMT    9:00 AM   (15 min.)   P RAD ONC SANIYA   [facility] 15     [facility] EXTERNAL RADIATION TREATMT    9:00 AM   (15 min.)   P RAD ONC SANIYA   [facility] Oncology Clinic 16     [facility] EXTERNAL RADIATION TREATMT    9:00 AM   (15 min.)   P RAD ONC SANIYA   Radiation Oncology Clinic 17      [facility] ONC INFUSION 120   10:00 AM   (120 min.)   [facility] ONCOLOGY INFUSION   [facility]   18     19     [facility] EXTERNAL RADIATION TREATMT    9:00 AM   (15 min.)   [facility] RAD ONC [facility]IYA   [facility] Clinic 20     [facility] EXTERNAL RADIATION TREATMT    9:00 AM   (15 min.)   [facility] RAD ONC [facility]YA   [facility] 21     [facility] EXTERNAL RADIATION TREATMT    9:00 AM   (15 min.)   [facility] RAD ONC [facility]   [facility] 22     [facility] EXTERNAL RADIATION TREATMT    9:00 AM   (15 min.)   [facility] RAD ONC [facility]YA   [facility] 23     [facility] EXTERNAL RADIATION TREATMT    9:00 AM   (15 min.)   [facility] RAD ONC [facility]   [facility] Clinic 24       25     26     27     28     29     30     31 April 2018 Sunday Monday Tuesday Wednesday Thursday Friday Saturday   1     2     3     4     5     6     7       8     9     10     11     12     13     14       15     16     17     18     19     20     21       22     23     24     25     26     27     28       29     30                                           Lab Results:  Recent Results (from the past 12 hour(s))   CBC with platelets differential    Collection Time: 03/13/18  9:27 AM   Result Value Ref Range    WBC 3.5 (L) 4.0 - 11.0 10e9/L    RBC Count 3.87 3.8 - 5.2 10e12/L    Hemoglobin 12.2 11.7 - 15.7 g/dL    Hematocrit 35.6 35.0 - 47.0 %    MCV 92 78 - 100 fl    MCH 31.5 26.5 - 33.0 pg    MCHC 34.3 31.5 - 36.5 g/dL    RDW 14.4 10.0 - 15.0 %    Platelet Count 188 150 - 450 10e9/L    Diff Method Automated Method     % Neutrophils 72.8 %    % Lymphocytes 7.2 %    % Monocytes 17.9 %    % Eosinophils 0.3 %    % Basophils 1.2 %    % Immature Granulocytes 0.6 %    Nucleated RBCs 0 0 /100    Absolute Neutrophil 2.5 1.6 - 8.3 10e9/L    Absolute Lymphocytes 0.3 (L) 0.8 - 5.3 10e9/L    Absolute Monocytes 0.6 0.0 - 1.3 10e9/L    Absolute Eosinophils 0.0 0.0 - 0.7 10e9/L    Absolute Basophils 0.0 0.0 - 0.2 10e9/L    Abs Immature Granulocytes 0.0 0 - 0.4  10e9/L    Absolute Nucleated RBC 0.0    Comprehensive metabolic panel    Collection Time: 03/13/18  9:27 AM   Result Value Ref Range    Sodium 134 133 - 144 mmol/L    Potassium 4.0 3.4 - 5.3 mmol/L    Chloride 101 94 - 109 mmol/L    Carbon Dioxide 24 20 - 32 mmol/L    Anion Gap 9 3 - 14 mmol/L    Glucose 84 70 - 99 mg/dL    Urea Nitrogen 14 7 - 30 mg/dL    Creatinine 0.70 0.52 - 1.04 mg/dL    GFR Estimate 81 >60 mL/min/1.7m2    GFR Estimate If Black >90 >60 mL/min/1.7m2    Calcium 8.0 (L) 8.5 - 10.1 mg/dL    Bilirubin Total 1.1 0.2 - 1.3 mg/dL    Albumin 2.2 (L) 3.4 - 5.0 g/dL    Protein Total 4.9 (L) 6.8 - 8.8 g/dL    Alkaline Phosphatase 56 40 - 150 U/L    ALT 19 0 - 50 U/L    AST 22 0 - 45 U/L               Follow-ups after your visit        Your next 10 appointments already scheduled     Mar 14, 2018  9:00 AM CDT   EXTERNAL RADIATION TREATMENT with [facility] RAD ONC SANIYA   Radiation Oncology Clinic ([facility])    [facility]  1st Floor  [address]  [facility] [address]   [phone]            Mar 15, 2018  9:00 AM CDT   EXTERNAL RADIATION TREATMENT with [facility] RAD ONC SANIYA   Radiation Oncology Clinic ([facility])    [facility]  1st Floor  [address]  [facility] [address]   [phone]            Mar 16, 2018  9:00 AM CDT   EXTERNAL RADIATION TREATMENT with [facility] RAD ONC SANIYA   Radiation Oncology Clinic ([facility])    [facility]  1st Floor  [address]  [facility] [address]   [phone]            Mar 17, 2018 10:00 AM CDT   Infusion 120 with [facility] ONCOLOGY INFUSION, [facility] 15 ATC   [facility] ([facility])    [address]  [address]  [facility] [address]   [phone]            Mar 19, 2018  9:00 AM CDT   EXTERNAL RADIATION TREATMENT with P RAD ONC SANIYA   Radiation Oncology Clinic ([facility])    [facility]  1st Floor  [address] [address]   [phone]            Mar 20, 2018  9:00 AM CDT   EXTERNAL RADIATION TREATMENT with [facility] RAD ONC SANIYA   Radiation Oncology Clinic ([facility])    [facility]  1st Floor  [address] [address]   [phone]            Mar 21, 2018  9:00 AM CDT   EXTERNAL RADIATION TREATMENT with [facility] RAD ONC SANIYA   Radiation Oncology Clinic ([facility])    [facility]  1st Floor  [address] [address]   [phone]            Mar 22, 2018  9:00 AM CDT   EXTERNAL RADIATION TREATMENT with [facility] RAD ONC SANIYA   Radiation Oncology Clinic ([facility])    [facility]  1st Floor  [address] [address]   [phone]            Mar 23, 2018  9:00 AM CDT   EXTERNAL RADIATION TREATMENT with [facility] RAD ONC SANIYA   Radiation Oncology Clinic ([facility])    [facility]  1st Floor  [address] [address]   [phone]              Who to contact     If you have questions or need follow up information about today's clinic visit or your schedule please contact [facility] directly at [phone].  Normal or non-critical lab and imaging results will be communicated to you by MyChart, letter or phone within 4 business days after the clinic has received the results. If you do not hear from us within 7 days, please contact the clinic through [employer] or phone. If you have a critical or abnormal lab result, we will notify you by phone as soon as possible.  Submit refill requests through [employer] or call your pharmacy and they will forward the refill request to us. Please allow 3 business days for your refill to be completed.          Additional Information About Your Visit        [employer] Information     [employer] gives you secure access to your  "electronic health record. If you see a primary care provider, you can also send messages to your care team and make appointments. If you have questions, please call your primary care clinic.  If you do not have a primary care provider, please call [phone] and they will assist you.        Care EveryWhere ID     This is your Care EveryWhere ID. This could be used by other organizations to access your [place] medical records  [id number]        Your Vitals Were     Pulse Temperature Respirations Height Pulse Oximetry BMI (Body Mass Index)    90 97.8  F (36.6  C) (Oral) 16 1.619 m (5' 3.74\") 98% 20.18 kg/m2       Blood Pressure from Last 3 Encounters:   03/13/18 107/60   03/09/18 102/68   03/09/18 (!) 89/66    Weight from Last 3 Encounters:   03/13/18 52.9 kg (116 lb 9.6 oz)   03/09/18 51.2 kg (112 lb 12.8 oz)   03/09/18 51.2 kg (112 lb 12.8 oz)              We Performed the Following     CBC with platelets differential     Comprehensive metabolic panel        Primary Care Provider Office Phone # Fax #    Baron Seth [phone] [address] [address]        Equal Access to Services     IONA DALEY : Hadii chelo ku hadasho Soomaali, waaxda luqadaha, qaybta kaalmada adeegyada, cyndy walton . So [facility] [phone].    ATENCIÓN: Si habla español, tiene a ghosh disposición servicios gratuitos de asistencia lingüística. Llame al [phone].    We comply with applicable federal civil rights laws and [place] laws. We do not discriminate on the basis of race, color, national origin, age, disability, sex, sexual orientation, or gender identity.            Thank you!     Thank you for choosing [facility]  for your care. Our goal is always to provide you with excellent care. Hearing back from our patients is one way we can continue to improve our services. Please take a few minutes to complete the written survey that you may receive in the " mail after your visit with us. Thank you!             Your Updated Medication List - Protect others around you: Learn how to safely use, store and throw away your medicines at www.disposemymeds.org.          This list is accurate as of 3/13/18 12:14 PM.  Always use your most recent med list.                   Brand Name Dispense Instructions for use Diagnosis    CALCIUM 500/VITAMIN D PO           hydrocortisone 2.5 % cream    ANUSOL-HC    30 g    Place rectally 2 times daily    External hemorrhoids       lidocaine-prilocaine cream    EMLA    5 g    Apply topically as needed for moderate pain    Malignant neoplasm of anal canal (H)       loperamide 2 MG tablet    IMODIUM A-D    30 tablet    Take 1 tablet (2 mg) by mouth 4 times daily as needed for diarrhea    Malignant neoplasm of anal canal (H)       LORazepam 0.5 MG tablet    ATIVAN    30 tablet    Take 1 tablet (0.5 mg) by mouth every 4 hours as needed (Anxiety, Nausea/Vomiting or Sleep)    Malignant neoplasm of anal canal (H)       magic mouthwash suspension    ENTER INGREDIENTS IN COMMENTS    500 mL    Swish and spit 5-10 mL four times daily as needed    Malignant neoplasm of anal canal (H)       potassium chloride SA 20 MEQ CR tablet    KLOR-CON    4 tablet    Take 1 tablet (20 mEq) by mouth daily for 4 days    Malignant neoplasm of anal canal (H)       pramipexole 0.125 MG tablet    MIRAPEX    60 tablet    Take one tablet 2-3 hours before bedtime, may increase to 2 tablets after one week if needed    Restless leg syndrome       prochlorperazine 10 MG tablet    COMPAZINE    30 tablet    Take 1 tablet (10 mg) by mouth every 6 hours as needed (Nausea/Vomiting)    Malignant neoplasm of anal canal (H)       TYLENOL EXTRA STRENGTH PO      1 TABLET EVERY 4 HOURS AS NEEDED        vitamin B complex with vitamin C Tabs tablet      Take 1 tablet by mouth daily

## 2018-03-14 ENCOUNTER — APPOINTMENT (OUTPATIENT)
Dept: RADIATION ONCOLOGY | Facility: CLINIC | Age: 78
End: 2018-03-14
Attending: RADIOLOGY
Payer: MEDICARE

## 2018-03-14 PROCEDURE — 77386 ZZH IMRT TREATMENT DELIVERY, COMPLEX: CPT | Performed by: RADIOLOGY

## 2018-03-15 ENCOUNTER — TELEPHONE (OUTPATIENT)
Dept: ONCOLOGY | Facility: CLINIC | Age: 78
End: 2018-03-15

## 2018-03-15 ENCOUNTER — INFUSION THERAPY VISIT (OUTPATIENT)
Dept: TRANSPLANT | Facility: CLINIC | Age: 78
End: 2018-03-15
Attending: INTERNAL MEDICINE
Payer: MEDICARE

## 2018-03-15 ENCOUNTER — APPOINTMENT (OUTPATIENT)
Dept: RADIATION ONCOLOGY | Facility: CLINIC | Age: 78
End: 2018-03-15
Attending: RADIOLOGY
Payer: MEDICARE

## 2018-03-15 VITALS
TEMPERATURE: 98.5 F | DIASTOLIC BLOOD PRESSURE: 68 MMHG | OXYGEN SATURATION: 98 % | SYSTOLIC BLOOD PRESSURE: 118 MMHG | HEART RATE: 84 BPM | RESPIRATION RATE: 18 BRPM

## 2018-03-15 VITALS
BODY MASS INDEX: 19.38 KG/M2 | SYSTOLIC BLOOD PRESSURE: 121 MMHG | HEART RATE: 77 BPM | DIASTOLIC BLOOD PRESSURE: 76 MMHG | WEIGHT: 112 LBS

## 2018-03-15 DIAGNOSIS — C21.1 MALIGNANT NEOPLASM OF ANAL CANAL (H): Primary | ICD-10-CM

## 2018-03-15 PROCEDURE — 96360 HYDRATION IV INFUSION INIT: CPT

## 2018-03-15 PROCEDURE — 25000128 H RX IP 250 OP 636: Mod: ZF | Performed by: INTERNAL MEDICINE

## 2018-03-15 PROCEDURE — 77386 ZZH IMRT TREATMENT DELIVERY, COMPLEX: CPT | Performed by: RADIOLOGY

## 2018-03-15 PROCEDURE — 96365 THER/PROPH/DIAG IV INF INIT: CPT

## 2018-03-15 RX ORDER — LOPERAMIDE HYDROCHLORIDE 2 MG/1
2 TABLET ORAL 4 TIMES DAILY PRN
Qty: 60 TABLET | Refills: 3 | Status: ON HOLD | OUTPATIENT
Start: 2018-03-15 | End: 2018-04-04

## 2018-03-15 RX ADMIN — SODIUM CHLORIDE 1000 ML: 9 INJECTION, SOLUTION INTRAVENOUS at 11:37

## 2018-03-15 ASSESSMENT — PAIN SCALES - GENERAL: PAINLEVEL: MILD PAIN (3)

## 2018-03-15 NOTE — PROGRESS NOTES
Infusion Nursing Note:  Elizabeth Taylor presents today for add-on infusion.    Patient seen by provider today: No   present during visit today: Not Applicable.        Intravenous Access:  Implanted Port.  Port dressing was not intact.  Patient reported her skin is very fragile; skin was WNL.   New transparent dressing was placed over port access.      Treatment Conditions:  Per Provider order, Patient received an add-on IV fluid infusion.  Please note she had chemo running through pump.  [facility] RN help place IV extension on current IV tubing so easy access to infuse fluids while chemo pump continued infusing.  Extension line was clamped when fluids were complete and left in place for use tomorrow and Saturday.      Post Infusion Assessment:  Patient tolerated infusion without incident.    Discharge Plan:   Patient discharged in stable condition accompanied by: friend.    MERRY CHESTER RN

## 2018-03-15 NOTE — MR AVS SNAPSHOT
After Visit Summary   3/15/2018    Elizabeth Taylor    MRN: [id number]           Patient Information     Date Of Birth          1940        Visit Information        Provider Department      3/15/2018 1:00 PM UC 6 ATC; UC BMT INFUSION [facility] Blood and Marrow Transplant        Today's Diagnoses     Malignant neoplasm of anal canal (H)    -  1          [facility] ([facility])  [address] [place] [address]  Phone: [phone]  Clinic Hours:   Monday-Thursday:7am to 7pm   Friday: 7am to 5pm   Weekends and holidays:    8am to noon (in general)  If your fever is 100.5  or greater,   call the clinic.  After hours call the   hospital at [phone] or   [phone]. Ask for the BMT   fellow on-call            Follow-ups after your visit        Your next 10 appointments already scheduled     Mar 16, 2018  9:00 AM CDT   EXTERNAL RADIATION TREATMENT with [facility] RAD ONC SANIYA   Radiation Oncology Clinic ([facility])    [facility]  1st Floor  [address] [address]   [phone]            Mar 17, 2018 12:00 PM CDT   Infusion 120 with UC ONCOLOGY INFUSION, UC 15 ATC   [facility] ([facility])    [address]  [address]  [facility] [address] [phone]            Mar 19, 2018  9:00 AM CDT   EXTERNAL RADIATION TREATMENT with [facility] RAD ONC SANIYA   Radiation Oncology Clinic ([facility])    [facility]  1st Floor  [address] [address]   [phone]            Mar 20, 2018  9:00 AM CDT   EXTERNAL RADIATION TREATMENT with [facility] RAD ONC SANIYA   Radiation Oncology Clinic ([facility])    [facility]  1st Floor  [address] [address]   [phone]            Mar 21, 2018  9:00 AM CDT   EXTERNAL RADIATION TREATMENT with [facility] RAD ONC SANIYA   Radiation Oncology Clinic ([facility]  [facility])    [facility]  1st Floor  [address] [address]   [phone]            Mar 22, 2018  9:00 AM CDT   EXTERNAL RADIATION TREATMENT with P RAD ONC SANIYA   Radiation Oncology Clinic ([facility])    [facility]  1st Floor  [address] [address]   [phone]            Mar 23, 2018  9:00 AM CDT   EXTERNAL RADIATION TREATMENT with [facility] RAD ONC SANIYA   Radiation Oncology Clinic ([facility])    [facility]  1st Floor  [address] [address]   [phone]            Mar 23, 2018  9:45 AM CDT   [facility] Draw with  [facility] DRAW   [facility] ([facility])    [address]  [address]  [facility] [facility] [address] [phone]            Mar 23, 2018 10:20 AM CDT   (Arrive by 10:05 AM)   Return Visit with GRAHAM Calvillo CNP   [facility] ([facility])    [address]  [address]  [facility] [facility] [address] [phone]              Who to contact     If you have questions or need follow up information about today's clinic visit or your schedule please contact [facility] BLOOD AND MARROW TRANSPLANT directly at [phone].  Normal or non-critical lab and imaging results will be communicated to you by [employer], letter or phone within 4 business days after the clinic has received the results. If you do not hear from us within 7 days, please contact the clinic through [employer] or phone. If you have a critical or abnormal lab result, we will notify you by phone as soon as possible.  Submit refill requests through [employer] or call your pharmacy and they will forward the refill request to us. Please allow 3 business days for your refill to be completed.          Additional Information About Your Visit        [employer] Information     [employer] gives you secure  access to your electronic health record. If you see a primary care provider, you can also send messages to your care team and make appointments. If you have questions, please call your primary care clinic.  If you do not have a primary care provider, please call [phone] and they will assist you.        Care EveryWhere ID     This is your Care EveryWhere ID. This could be used by other organizations to access your [place] medical records  [id number]        Your Vitals Were     Pulse Temperature Respirations Pulse Oximetry          84 98.5  F (36.9  C) (Oral) 18 98%         Blood Pressure from Last 3 Encounters:   03/15/18 118/68   03/15/18 121/76   03/13/18 107/60    Weight from Last 3 Encounters:   03/15/18 50.8 kg (112 lb)   03/13/18 52.9 kg (116 lb 9.6 oz)   03/09/18 51.2 kg (112 lb 12.8 oz)              Today, you had the following     No orders found for display         Today's Medication Changes          These changes are accurate as of 3/15/18  1:51 PM.  If you have any questions, ask your nurse or doctor.               Start taking these medicines.        Dose/Directions    opium tincture dilute (1 mg/mL morphine equivalent)   Used for:  Malignant neoplasm of anal canal (H)   Started by:  Zaire Madison MD        Take 5 mg by mouth every 6 hours as needed for diarrhea.   Quantity:  200 mL   Refills:  0            Where to get your medicines      Some of these will need a paper prescription and others can be bought over the counter.  Ask your nurse if you have questions.     Bring a paper prescription for each of these medications     loperamide 2 MG tablet    opium tincture dilute (1 mg/mL morphine equivalent)                Recent Review Flowsheet Data     BMT Recent Results Latest Ref Rng & Units 1/20/2018 2/2/2018 2/12/2018 2/23/2018 3/2/2018 3/9/2018 3/13/2018    WBC 4.0 - 11.0 10e9/L 5.9 5.8 6.7 2.4(L) 1.7(L) 2.1(L) 3.5(L)    Hemoglobin 11.7 - 15.7 g/dL 14.0 13.4 13.5 12.2 12.6 12.1  12.2    Platelet Count 150 - 450 10e9/L 196 166 184 111(L) 92(L) 175 188    Neutrophils (Absolute) 1.6 - 8.3 10e9/L - 4.4 5.4 1.5(L) 0.8(L) 1.3(L) 2.5    INR 0.86 - 1.14 - 1.08 - - - - -    Sodium 133 - 144 mmol/L 139 140 138 140 139 135 134    Potassium 3.4 - 5.3 mmol/L 3.9 3.9 3.8 3.8 3.6 3.1(L) 4.0    Chloride 94 - 109 mmol/L 104 104 104 106 106 100 101    Glucose 70 - 99 mg/dL 95 107(H) 125(H) 105(H) 89 94 84    Urea Nitrogen 7 - 30 mg/dL 18 18 17 18 15 19 14    Creatinine 0.52 - 1.04 mg/dL 0.74 0.72 0.60 0.67 0.68 0.68 0.70    Calcium (Total) 8.5 - 10.1 mg/dL 9.1 8.9 8.7 8.9 8.7 8.0(L) 8.0(L)    Protein (Total) 6.8 - 8.8 g/dL 7.7 7.0 6.8 6.1(L) 5.8(L) 5.2(L) 4.9(L)    Albumin 3.4 - 5.0 g/dL 4.1 3.7 3.6 3.3(L) 3.2(L) 2.6(L) 2.2(L)    Alkaline Phosphatase 40 - 150 U/L 115 105 98 84 79 58 56    AST 0 - 45 U/L 44 31 33 33 23 29 22    ALT 0 - 50 U/L 57(H) 43 42 40 32 23 19    MCV 78 - 100 fl 96 94 94 95 95 92 92               Primary Care Provider Office Phone # Fax #    Baron Seth [phone] [address]  [facility] [address]        Equal Access to Services     [facility] AH: Hadii aad ku hadasho Soomaali, waaxda luqadaha, qaybta kaalmada eliseo, cyndy catalan. So [facility] [phone].    ATENCIÓN: Si habla español, tiene a ghosh disposición servicios gratuitos de asistencia lingüística. Llame al [phone].    We comply with applicable federal civil rights laws and [place] laws. We do not discriminate on the basis of race, color, national origin, age, disability, sex, sexual orientation, or gender identity.            Thank you!     Thank you for choosing [facility] BLOOD AND MARROW TRANSPLANT  for your care. Our goal is always to provide you with excellent care. Hearing back from our patients is one way we can continue to improve our services. Please take a few minutes to complete the written survey that you may receive in the mail after your visit with us.  Thank you!             Your Updated Medication List - Protect others around you: Learn how to safely use, store and throw away your medicines at www.disposemymeds.org.          This list is accurate as of 3/15/18  1:51 PM.  Always use your most recent med list.                   Brand Name Dispense Instructions for use Diagnosis    CALCIUM 500/VITAMIN D PO           hydrocortisone 2.5 % cream    ANUSOL-HC    30 g    Place rectally 2 times daily    External hemorrhoids       lidocaine-prilocaine cream    EMLA    5 g    Apply topically as needed for moderate pain    Malignant neoplasm of anal canal (H)       loperamide 2 MG tablet    LOPERAMIDE A-D    60 tablet    Take 1 tablet (2 mg) by mouth 4 times daily as needed for diarrhea    Malignant neoplasm of anal canal (H)       LORazepam 0.5 MG tablet    ATIVAN    30 tablet    Take 1 tablet (0.5 mg) by mouth every 4 hours as needed (Anxiety, Nausea/Vomiting or Sleep)    Malignant neoplasm of anal canal (H)       magic mouthwash suspension    ENTER INGREDIENTS IN COMMENTS    500 mL    Swish and spit 5-10 mL four times daily as needed    Malignant neoplasm of anal canal (H)       opium tincture dilute (1 mg/mL morphine equivalent)     200 mL    Take 5 mg by mouth every 6 hours as needed for diarrhea.    Malignant neoplasm of anal canal (H)       pramipexole 0.125 MG tablet    MIRAPEX    60 tablet    Take one tablet 2-3 hours before bedtime, may increase to 2 tablets after one week if needed    Restless leg syndrome       prochlorperazine 10 MG tablet    COMPAZINE    30 tablet    Take 1 tablet (10 mg) by mouth every 6 hours as needed (Nausea/Vomiting)    Malignant neoplasm of anal canal (H)       TYLENOL EXTRA STRENGTH PO      1 TABLET EVERY 4 HOURS AS NEEDED        vitamin B complex with vitamin C Tabs tablet      Take 1 tablet by mouth daily

## 2018-03-15 NOTE — TELEPHONE ENCOUNTER
Dr. Madison saw the patient in radiation oncology and had radiation oncology nurses call to get Elizabeth IVF today for dehydration. (Also request repeat IVF for Friday and Saturday)    Spoke to BMT infusion charge nurse who OK'd IVF 1L NS at 1:00pm.

## 2018-03-15 NOTE — PROGRESS NOTES
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 Taylor presents to clinic for her weekly on-treatment visit. She received her second cycle of dose-reduced infusional 5-FU on 3/13/2018 with her mitomycin held secondary to concern for treatment-related toxicity. Today on examination she reports a significant increase in her diarrhea despite maxing her loperamide dosing with 7 episodes of diarrhea overnight and this morning. She denies any fever/chills, nausea/vomiting, abdominal pain, dyspnea or chest pain. Her perianal discomfort remains well-controlled with as needed Tylenol and her prescribed topicals. Her remaining ROS are otherwise unremarkable.    ROS:   Nutrition Alteration  Diet Type: Patient's Preference  Nutrition Note: Good appetite     Skin  Skin Reaction: Moderate erythema over the bilateral inguinal and perianal region     ENT and Mouth Exam  Mucositis - Current: None     Gastrointestinal  Nausea: None  Diarrhea: Six to eight soft or liquid bowel movements per day     Psychosocial  Pyschosocial Note: Moderate fatigue    Pain Assessment  Pain Note: Mild discomfort but no significant pain    Objective:   Weight: 50.8 kg  BP: 121/76 (sitting), 104/72 (standing)  Pulse: 77 (sitting), 87 (standing)    General: Moderately fatigued appearing but in no acute distress  HEENT: MMM, no mucositis or thrush  Cardiac: Extremities are warm and well-perfused  Respiratory: No wheezing, stridor or respiratory distress  Skin: Confluent erythema over the bilateral inguinal regions (left>right). The perineum and perianal regions are moderately erythematous  with mild edema of the bilateral labia majora. There is no evidence of desquamation or ulceration.    Treatment-related toxicities (CTCAE v4.0):  1. Fatigue: Grade 1: Fatigue relieved by rest  2. Nausea: Grade 1: Loss of appetite without alteration in eating habits  3. Diarrhea: Grade 3: Increase of >=7 stools per day over baseline; incontinence; hospitalization indicated; severe increase in ostomy output compared to baseline; limiting self-care ADL  4. Dermatitis: Grade 2: Moderate to brisk erythema; patchy moist desquamation, mostly confined to skin folds and creases; moderate erythema    Assessment:    Ms. 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 She is receiving concurrent chemoradiotherapy with curative intent. She has developed increased dermatitis with her perianal pain well-controlled with OTC analgesics and prescription topical creams/ointments. Her most pressing issue is worsening diarrhea and dehydration which remains suboptimally controlled with her current loperamide prescription.      Plan:   1. Continue radiotherapy  2. IV fluids today following treatment  3. Start tincture of opium for diarrhea  4. Instructed patient to bring in all medications on Monday for review    Mosaiq chart and setup information reviewed  IGRT images reviewed    Medication Review  Med list reviewed with patient?: Yes    The patient was seen and discussed with staff, Dr. Madison.    Juan Don MD  Resident, PGY-3  Department of Radiation Oncology  [facility]  [phone]      Attending addendum:   I saw and examined the patient with the resident and agree with the documented plan of care.    Zaire Madison MD/PhD  Dept of Radiation Oncology  [facility]

## 2018-03-15 NOTE — MR AVS SNAPSHOT
After Visit Summary   3/15/2018    Elizabeth Taylor    MRN: [id number]           Patient Information     Date Of Birth          1940        Visit Information        Provider Department      3/15/2018 9:00 AM Zaire Madison MD Radiation Oncology Clinic        Today's Diagnoses     Malignant neoplasm of anal canal (H)    -  1       Follow-ups after your visit        Your next 10 appointments already scheduled     Mar 15, 2018  1:00 PM CDT   Infusion 120 with UC BMT INFUSION, UC 6 ATC   [facility] Blood and Marrow Transplant ([facility])    [address]  [address]  [place] [address]   [phone]            Mar 16, 2018  9:00 AM CDT   EXTERNAL RADIATION TREATMENT with [facility] RAD ONC SANIYA   Radiation Oncology Clinic ([facility])    [facility]  1st Floor  [address] [address]   [phone]            Mar 17, 2018 12:00 PM CDT   Infusion 120 with UC ONCOLOGY INFUSION, UC 15 ATC   [facility] ([facility])    [address]  [address]  [place] [address]   [phone]            Mar 19, 2018  9:00 AM CDT   EXTERNAL RADIATION TREATMENT with [facility] RAD ONC SANIYA   Radiation Oncology Clinic ([facility])    [facility]  1st Floor  [address] [address]   [phone]            Mar 20, 2018  9:00 AM CDT   EXTERNAL RADIATION TREATMENT with [facility] RAD ONC SANIYA   Radiation Oncology Clinic ([facility])    [facility]  1st Floor  [address] [address]   [phone]            Mar 21, 2018  9:00 AM CDT   EXTERNAL RADIATION TREATMENT with [facility] RAD ONC SANIYA   Radiation Oncology Clinic ([facility])    [facility]  1st Floor  [address] [address]   [phone]            Mar 22, 2018  9:00 AM  CDT   EXTERNAL RADIATION TREATMENT with [facility] RAD ONC SANIYA   Radiation Oncology Clinic ([facility])    [facility]  1st Floor  [address] [address] [phone]            Mar 23, 2018  9:00 AM CDT   EXTERNAL RADIATION TREATMENT with [facility] RAD ONC SANIYA   Radiation Oncology Clinic ([facility])    [facility]  1st Floor  [address] [address] [phone]              Who to contact     Please call your clinic at [phone] to:    Ask questions about your health    Make or cancel appointments    Discuss your medicines    Learn about your test results    Speak to your doctor            Additional Information About Your Visit        [employer] Information     [employer] gives you secure access to your electronic health record. If you see a primary care provider, you can also send messages to your care team and make appointments. If you have questions, please call your primary care clinic.  If you do not have a primary care provider, please call [phone] and they will assist you.      [employer] is an electronic gateway that provides easy, online access to your medical records. With [employer], you can request a clinic appointment, read your test results, renew a prescription or communicate with your care team.     To access your existing account, please contact your [facility] or call [phone] for assistance.        Care EveryWhere ID     This is your Care EveryWhere ID. This could be used by other organizations to access your [place] medical records  [id number]        Your Vitals Were     Pulse BMI (Body Mass Index)                77 19.38 kg/m2           Blood Pressure from Last 3 Encounters:   03/15/18 121/76   03/13/18 107/60   03/09/18 102/68    Weight from Last 3 Encounters:   03/15/18 50.8 kg (112 lb)   03/13/18 52.9 kg (116 lb 9.6 oz)   03/09/18 51.2 kg (112 lb 12.8 oz)               Today, you had the following     No orders found for display         Today's Medication Changes          These changes are accurate as of 3/15/18 11:09 AM.  If you have any questions, ask your nurse or doctor.               Start taking these medicines.        Dose/Directions    opium tincture dilute (1 mg/mL morphine equivalent)   Used for:  Malignant neoplasm of anal canal (H)   Started by:  Zaire Madison MD        Take 5 mg by mouth every 6 hours as needed for diarrhea.   Quantity:  200 mL   Refills:  0            Where to get your medicines      Some of these will need a paper prescription and others can be bought over the counter.  Ask your nurse if you have questions.     Bring a paper prescription for each of these medications     loperamide 2 MG tablet    opium tincture dilute (1 mg/mL morphine equivalent)                Primary Care Provider Office Phone # Fax #    Baron Seth [phone] [address]  [facility] [facility] [address]        Equal Access to Services     [facility] AreaAS[facility] : Hadii chelo kramer hadasho Somushtaq, waaxda luqadaha, qaybta kaalmada adeegyada, cyndy walton . So [facility] [phone].    ATENCIÓN: Si habla español, tiene a ghosh disposición servicios gratuitos de asistencia lingüística. Llame al [phone].    We comply with applicable federal civil rights laws and [place] laws. We do not discriminate on the basis of race, color, national origin, age, disability, sex, sexual orientation, or gender identity.            Thank you!     Thank you for choosing RADIATION ONCOLOGY CLINIC  for your care. Our goal is always to provide you with excellent care. Hearing back from our patients is one way we can continue to improve our services. Please take a few minutes to complete the written survey that you may receive in the mail after your visit with us. Thank you!             Your Updated Medication List - Protect others around you:  Learn how to safely use, store and throw away your medicines at www.disposemymeds.org.          This list is accurate as of 3/15/18 11:09 AM.  Always use your most recent med list.                   Brand Name Dispense Instructions for use Diagnosis    CALCIUM 500/VITAMIN D PO           hydrocortisone 2.5 % cream    ANUSOL-HC    30 g    Place rectally 2 times daily    External hemorrhoids       lidocaine-prilocaine cream    EMLA    5 g    Apply topically as needed for moderate pain    Malignant neoplasm of anal canal (H)       loperamide 2 MG tablet    LOPERAMIDE A-D    60 tablet    Take 1 tablet (2 mg) by mouth 4 times daily as needed for diarrhea    Malignant neoplasm of anal canal (H)       LORazepam 0.5 MG tablet    ATIVAN    30 tablet    Take 1 tablet (0.5 mg) by mouth every 4 hours as needed (Anxiety, Nausea/Vomiting or Sleep)    Malignant neoplasm of anal canal (H)       magic mouthwash suspension    ENTER INGREDIENTS IN COMMENTS    500 mL    Swish and spit 5-10 mL four times daily as needed    Malignant neoplasm of anal canal (H)       opium tincture dilute (1 mg/mL morphine equivalent)     200 mL    Take 5 mg by mouth every 6 hours as needed for diarrhea.    Malignant neoplasm of anal canal (H)       pramipexole 0.125 MG tablet    MIRAPEX    60 tablet    Take one tablet 2-3 hours before bedtime, may increase to 2 tablets after one week if needed    Restless leg syndrome       prochlorperazine 10 MG tablet    COMPAZINE    30 tablet    Take 1 tablet (10 mg) by mouth every 6 hours as needed (Nausea/Vomiting)    Malignant neoplasm of anal canal (H)       TYLENOL EXTRA STRENGTH PO      1 TABLET EVERY 4 HOURS AS NEEDED        vitamin B complex with vitamin C Tabs tablet      Take 1 tablet by mouth daily

## 2018-03-16 ENCOUNTER — APPOINTMENT (OUTPATIENT)
Dept: RADIATION ONCOLOGY | Facility: CLINIC | Age: 78
End: 2018-03-16
Attending: RADIOLOGY
Payer: MEDICARE

## 2018-03-16 DIAGNOSIS — C21.1 MALIGNANT NEOPLASM OF ANAL CANAL (H): Primary | ICD-10-CM

## 2018-03-16 PROCEDURE — 25000128 H RX IP 250 OP 636: Mod: ZF | Performed by: INTERNAL MEDICINE

## 2018-03-16 PROCEDURE — 96360 HYDRATION IV INFUSION INIT: CPT | Mod: ZF

## 2018-03-16 PROCEDURE — 77386 ZZH IMRT TREATMENT DELIVERY, COMPLEX: CPT | Performed by: RADIOLOGY

## 2018-03-16 PROCEDURE — 77336 RADIATION PHYSICS CONSULT: CPT | Performed by: RADIOLOGY

## 2018-03-16 RX ADMIN — SODIUM CHLORIDE 1000 ML: 9 INJECTION, SOLUTION INTRAVENOUS at 09:08

## 2018-03-16 NOTE — PROGRESS NOTES
Infusion Nursing Note:  Elizabeth Taylor presents today for IVF's.    Patient seen by provider today: No   present during visit today: Not Applicable.    Note: N/A.    Intravenous Access:  Implanted Port.    Treatment Conditions:  Not Applicable.      Post Infusion Assessment:  Patient tolerated infusion without incident.  Site patent and intact, free from redness, edema or discomfort.  Access discontinued per protocol.    Discharge Plan:   Patient discharged in stable condition accompanied by: self and daughter.    Tennille Brennan RN

## 2018-03-16 NOTE — MR AVS SNAPSHOT
After Visit Summary   3/16/2018    Elizabeth Taylor    MRN: [id number]           Patient Information     Date Of Birth          1940        Visit Information        Provider Department      3/16/2018 9:00 AM Nurse, p Rad Onc Radiation Oncology Clinic        Today's Diagnoses     Malignant neoplasm of anal canal (H)    -  1       Follow-ups after your visit        Your next 10 appointments already scheduled     Mar 17, 2018 10:00 AM CDT   Infusion 120 with UC ONCOLOGY INFUSION, UC 15 ATC   [facility] ([email or web address])    [address]  [address]  [facility] [address]   [phone]            Mar 19, 2018  9:00 AM CDT   EXTERNAL RADIATION TREATMENT with P RAD ONC SANIYA   Radiation Oncology Clinic ([facility])    [facility]  1st Floor  [address] [address]   [phone]            Mar 20, 2018  9:00 AM CDT   EXTERNAL RADIATION TREATMENT with UMP RAD ONC SANIYA   Radiation Oncology Clinic ([facility])    [facility]  1st Floor  [address] [address]   [phone]            Mar 21, 2018  9:00 AM CDT   EXTERNAL RADIATION TREATMENT with UMP RAD ONC SANIYA   Radiation Oncology Clinic ([facility])    [facility]  1st Floor  [address] [address]   [phone]            Mar 22, 2018  9:00 AM CDT   EXTERNAL RADIATION TREATMENT with UMP RAD ONC SANIYA   Radiation Oncology Clinic ([facility])    [facility]  1st Floor  [address] [address]   [phone]            Mar 23, 2018  9:00 AM CDT   EXTERNAL RADIATION TREATMENT with UMP RAD ONC SANIYA   Radiation Oncology Clinic ([facility])    [facility]  1st Floor  [address] [facility] [address]   [phone]            Mar 23, 2018  9:45  AM CDT   Masonic Lab Draw with  MASONIC LAB DRAW   [facility] Masonic Lab Draw ([facility])    [address]  [address]  [facility] [id number] [phone]            Mar 23, 2018 10:20 AM CDT   (Arrive by 10:05 AM)   Return Visit with GRAHAM Calvillo CNP   [facility]    [address]  [address]  [facility] [address] [phone]            Apr 06, 2018 10:00 AM CDT   Masonic Lab Draw with  MASONIC LAB DRAW   [facility] Masonic Lab Draw ([facility])    [address]  [address]  [facility] [address] [phone]              Who to contact     Please call your clinic at [phone] to:    Ask questions about your health    Make or cancel appointments    Discuss your medicines    Learn about your test results    Speak to your doctor            Additional Information About Your Visit        AthletePath Information     Ath[employer]Path gives you secure access to your electronic health record. If you see a primary care provider, you can also send messages to your care team and make appointments. If you have questions, please call your primary care clinic.  If you do not have a primary care provider, please call [phone] and they will assist you.      Ath[employer]Path is an electronic gateway that provides easy, online access to your medical records. With [employer]Path, you can request a clinic appointment, read your test results, renew a prescription or communicate with your care team.     To access your existing account, please contact your [facility] or call [phone] for assistance.        Care EveryWhere ID     This is your Care EveryWhere ID. This could be used by other organizations to access your [place] medical records  [id number]         Blood Pressure from Last 3 Encounters:   03/15/18 118/68   03/15/18 121/76   03/13/18 107/60    Weight from  Last 3 Encounters:   03/15/18 50.8 kg (112 lb)   03/13/18 52.9 kg (116 lb 9.6 oz)   03/09/18 51.2 kg (112 lb 12.8 oz)              Today, you had the following     No orders found for display       Primary Care Provider Office Phone # Fax #    Baron Dread Seth [phone]       [address]  [facility] [address]        Equal Access to Services     IONA DALEY : Hadii aad ku hadasho Soomaali, waaxda luqadaha, qaybta kaalmada adeegyada, waxay idiin hayaan adeeg lucianaarashirley lalillian . So [facility] [phone].    ATENCIÓN: Si cristian espcliff, tiene a ghosh disposición servicios gratuitos de asistencia lingüística. [facility] [phone].    We comply with applicable federal civil rights laws and [place] laws. We do not discriminate on the basis of race, color, national origin, age, disability, sex, sexual orientation, or gender identity.            Thank you!     Thank you for choosing RADIATION ONCOLOGY CLINIC  for your care. Our goal is always to provide you with excellent care. Hearing back from our patients is one way we can continue to improve our services. Please take a few minutes to complete the written survey that you may receive in the mail after your visit with us. Thank you!             Your Updated Medication List - Protect others around you: Learn how to safely use, store and throw away your medicines at www.disposemymeds.org.          This list is accurate as of 3/16/18 11:24 AM.  Always use your most recent med list.                   Brand Name Dispense Instructions for use Diagnosis    CALCIUM 500/VITAMIN D PO           hydrocortisone 2.5 % cream    ANUSOL-HC    30 g    Place rectally 2 times daily    External hemorrhoids       lidocaine-prilocaine cream    EMLA    5 g    Apply topically as needed for moderate pain    Malignant neoplasm of anal canal (H)       loperamide 2 MG tablet    LOPERAMIDE A-D    60 tablet    Take 1 tablet (2 mg) by mouth 4 times daily as needed for diarrhea    Malignant  neoplasm of anal canal (H)       LORazepam 0.5 MG tablet    ATIVAN    30 tablet    Take 1 tablet (0.5 mg) by mouth every 4 hours as needed (Anxiety, Nausea/Vomiting or Sleep)    Malignant neoplasm of anal canal (H)       magic mouthwash suspension    ENTER INGREDIENTS IN COMMENTS    500 mL    Swish and spit 5-10 mL four times daily as needed    Malignant neoplasm of anal canal (H)       opium tincture dilute (1 mg/mL morphine equivalent)     200 mL    Take 5 mg by mouth every 6 hours as needed for diarrhea.    Malignant neoplasm of anal canal (H)       pramipexole 0.125 MG tablet    MIRAPEX    60 tablet    Take one tablet 2-3 hours before bedtime, may increase to 2 tablets after one week if needed    Restless leg syndrome       prochlorperazine 10 MG tablet    COMPAZINE    30 tablet    Take 1 tablet (10 mg) by mouth every 6 hours as needed (Nausea/Vomiting)    Malignant neoplasm of anal canal (H)       TYLENOL EXTRA STRENGTH PO      1 TABLET EVERY 4 HOURS AS NEEDED        vitamin B complex with vitamin C Tabs tablet      Take 1 tablet by mouth daily

## 2018-03-17 ENCOUNTER — INFUSION THERAPY VISIT (OUTPATIENT)
Dept: ONCOLOGY | Facility: CLINIC | Age: 78
End: 2018-03-17
Attending: INTERNAL MEDICINE
Payer: MEDICARE

## 2018-03-17 ENCOUNTER — HOSPITAL ENCOUNTER (EMERGENCY)
Facility: CLINIC | Age: 78
Discharge: HOME | End: 2018-03-17
Attending: FAMILY MEDICINE | Admitting: FAMILY MEDICINE
Payer: MEDICARE

## 2018-03-17 ENCOUNTER — APPOINTMENT (OUTPATIENT)
Dept: GENERAL RADIOLOGY | Facility: CLINIC | Age: 78
End: 2018-03-17
Attending: FAMILY MEDICINE
Payer: MEDICARE

## 2018-03-17 VITALS
RESPIRATION RATE: 18 BRPM | BODY MASS INDEX: 21.04 KG/M2 | HEART RATE: 78 BPM | TEMPERATURE: 97.9 F | OXYGEN SATURATION: 94 % | DIASTOLIC BLOOD PRESSURE: 61 MMHG | SYSTOLIC BLOOD PRESSURE: 96 MMHG | WEIGHT: 121.6 LBS

## 2018-03-17 VITALS
RESPIRATION RATE: 16 BRPM | TEMPERATURE: 98.3 F | OXYGEN SATURATION: 100 % | WEIGHT: 121.69 LBS | SYSTOLIC BLOOD PRESSURE: 97 MMHG | HEART RATE: 91 BPM | BODY MASS INDEX: 21.06 KG/M2 | DIASTOLIC BLOOD PRESSURE: 64 MMHG

## 2018-03-17 DIAGNOSIS — C21.1 MALIGNANT NEOPLASM OF ANAL CANAL (H): Primary | ICD-10-CM

## 2018-03-17 DIAGNOSIS — C21.1 MALIGNANT NEOPLASM OF ANAL CANAL (H): ICD-10-CM

## 2018-03-17 DIAGNOSIS — R53.1 WEAKNESS GENERALIZED: ICD-10-CM

## 2018-03-17 DIAGNOSIS — E86.0 DEHYDRATION: ICD-10-CM

## 2018-03-17 DIAGNOSIS — R19.7 DIARRHEA, UNSPECIFIED TYPE: ICD-10-CM

## 2018-03-17 LAB
ABO + RH BLD: NORMAL
ABO + RH BLD: NORMAL
ALBUMIN SERPL-MCNC: 1.6 G/DL (ref 3.4–5)
ALBUMIN UR-MCNC: 10 MG/DL
ALP SERPL-CCNC: 49 U/L (ref 40–150)
ALT SERPL W P-5'-P-CCNC: 25 U/L (ref 0–50)
AMMONIA PLAS-SCNC: 36 UMOL/L (ref 10–50)
ANION GAP SERPL CALCULATED.3IONS-SCNC: 9 MMOL/L (ref 3–14)
APPEARANCE UR: CLEAR
APTT PPP: 29 SEC (ref 22–37)
AST SERPL W P-5'-P-CCNC: 27 U/L (ref 0–45)
BASOPHILS # BLD AUTO: 0 10E9/L (ref 0–0.2)
BASOPHILS NFR BLD AUTO: 0 %
BILIRUB SERPL-MCNC: 1 MG/DL (ref 0.2–1.3)
BILIRUB UR QL STRIP: NEGATIVE
BLD GP AB SCN SERPL QL: NORMAL
BLOOD BANK CMNT PATIENT-IMP: NORMAL
BUN SERPL-MCNC: 15 MG/DL (ref 7–30)
CALCIUM SERPL-MCNC: 7.7 MG/DL (ref 8.5–10.1)
CHLORIDE SERPL-SCNC: 104 MMOL/L (ref 94–109)
CO2 SERPL-SCNC: 21 MMOL/L (ref 20–32)
COLOR UR AUTO: YELLOW
CREAT SERPL-MCNC: 0.79 MG/DL (ref 0.52–1.04)
DIFFERENTIAL METHOD BLD: ABNORMAL
EOSINOPHIL # BLD AUTO: 0 10E9/L (ref 0–0.7)
EOSINOPHIL NFR BLD AUTO: 1 %
ERYTHROCYTE [DISTWIDTH] IN BLOOD BY AUTOMATED COUNT: 14.7 % (ref 10–15)
GFR SERPL CREATININE-BSD FRML MDRD: 71 ML/MIN/1.7M2
GLUCOSE SERPL-MCNC: 106 MG/DL (ref 70–99)
GLUCOSE UR STRIP-MCNC: NEGATIVE MG/DL
HCT VFR BLD AUTO: 35.9 % (ref 35–47)
HGB BLD-MCNC: 12 G/DL (ref 11.7–15.7)
HGB UR QL STRIP: NEGATIVE
INR PPP: 1.46 (ref 0.86–1.14)
KETONES UR STRIP-MCNC: 10 MG/DL
LACTATE BLD-SCNC: 0.7 MMOL/L (ref 0.7–2)
LEUKOCYTE ESTERASE UR QL STRIP: ABNORMAL
LYMPHOCYTES # BLD AUTO: 0 10E9/L (ref 0.8–5.3)
LYMPHOCYTES NFR BLD AUTO: 2 %
MAGNESIUM SERPL-MCNC: 1.3 MG/DL (ref 1.6–2.3)
MCH RBC QN AUTO: 30.8 PG (ref 26.5–33)
MCHC RBC AUTO-ENTMCNC: 33.4 G/DL (ref 31.5–36.5)
MCV RBC AUTO: 92 FL (ref 78–100)
MONOCYTES # BLD AUTO: 0 10E9/L (ref 0–1.3)
MONOCYTES NFR BLD AUTO: 0 %
MUCOUS THREADS #/AREA URNS LPF: PRESENT /LPF
NEUTROPHILS # BLD AUTO: 1.3 10E9/L (ref 1.6–8.3)
NEUTROPHILS NFR BLD AUTO: 97 %
NITRATE UR QL: NEGATIVE
PH UR STRIP: 5.5 PH (ref 5–7)
PLATELET # BLD AUTO: 178 10E9/L (ref 150–450)
POTASSIUM SERPL-SCNC: 3.4 MMOL/L (ref 3.4–5.3)
PROT SERPL-MCNC: 4.4 G/DL (ref 6.8–8.8)
RBC # BLD AUTO: 3.89 10E12/L (ref 3.8–5.2)
RBC #/AREA URNS AUTO: 2 /HPF (ref 0–2)
SODIUM SERPL-SCNC: 134 MMOL/L (ref 133–144)
SOURCE: ABNORMAL
SP GR UR STRIP: 1.01 (ref 1–1.03)
SPECIMEN EXP DATE BLD: NORMAL
SQUAMOUS #/AREA URNS AUTO: 1 /HPF (ref 0–1)
TROPONIN I SERPL-MCNC: 0.04 UG/L (ref 0–0.04)
TSH SERPL DL<=0.005 MIU/L-ACNC: 3.87 MU/L (ref 0.4–4)
UROBILINOGEN UR STRIP-MCNC: NORMAL MG/DL (ref 0–2)
WBC # BLD AUTO: 1.3 10E9/L (ref 4–11)
WBC #/AREA URNS AUTO: 9 /HPF (ref 0–5)

## 2018-03-17 PROCEDURE — 85025 COMPLETE CBC W/AUTO DIFF WBC: CPT | Performed by: FAMILY MEDICINE

## 2018-03-17 PROCEDURE — 81001 URINALYSIS AUTO W/SCOPE: CPT | Performed by: FAMILY MEDICINE

## 2018-03-17 PROCEDURE — 96361 HYDRATE IV INFUSION ADD-ON: CPT

## 2018-03-17 PROCEDURE — 86901 BLOOD TYPING SEROLOGIC RH(D): CPT | Performed by: FAMILY MEDICINE

## 2018-03-17 PROCEDURE — 84443 ASSAY THYROID STIM HORMONE: CPT | Performed by: FAMILY MEDICINE

## 2018-03-17 PROCEDURE — 83605 ASSAY OF LACTIC ACID: CPT | Performed by: FAMILY MEDICINE

## 2018-03-17 PROCEDURE — 99285 EMERGENCY DEPT VISIT HI MDM: CPT | Mod: 25 | Performed by: FAMILY MEDICINE

## 2018-03-17 PROCEDURE — 80053 COMPREHEN METABOLIC PANEL: CPT | Performed by: FAMILY MEDICINE

## 2018-03-17 PROCEDURE — 85610 PROTHROMBIN TIME: CPT | Performed by: FAMILY MEDICINE

## 2018-03-17 PROCEDURE — 93010 ELECTROCARDIOGRAM REPORT: CPT | Mod: Z6 | Performed by: FAMILY MEDICINE

## 2018-03-17 PROCEDURE — 85730 THROMBOPLASTIN TIME PARTIAL: CPT | Performed by: FAMILY MEDICINE

## 2018-03-17 PROCEDURE — 87040 BLOOD CULTURE FOR BACTERIA: CPT | Performed by: FAMILY MEDICINE

## 2018-03-17 PROCEDURE — 25000128 H RX IP 250 OP 636: Mod: ZF | Performed by: INTERNAL MEDICINE

## 2018-03-17 PROCEDURE — 84484 ASSAY OF TROPONIN QUANT: CPT | Performed by: FAMILY MEDICINE

## 2018-03-17 PROCEDURE — 71045 X-RAY EXAM CHEST 1 VIEW: CPT

## 2018-03-17 PROCEDURE — 82140 ASSAY OF AMMONIA: CPT | Performed by: FAMILY MEDICINE

## 2018-03-17 PROCEDURE — 96361 HYDRATE IV INFUSION ADD-ON: CPT | Performed by: FAMILY MEDICINE

## 2018-03-17 PROCEDURE — 86900 BLOOD TYPING SEROLOGIC ABO: CPT | Performed by: FAMILY MEDICINE

## 2018-03-17 PROCEDURE — 96360 HYDRATION IV INFUSION INIT: CPT | Performed by: FAMILY MEDICINE

## 2018-03-17 PROCEDURE — G0463 HOSPITAL OUTPT CLINIC VISIT: HCPCS | Mod: 25

## 2018-03-17 PROCEDURE — 99284 EMERGENCY DEPT VISIT MOD MDM: CPT | Mod: 25 | Performed by: FAMILY MEDICINE

## 2018-03-17 PROCEDURE — 96360 HYDRATION IV INFUSION INIT: CPT

## 2018-03-17 PROCEDURE — 93005 ELECTROCARDIOGRAM TRACING: CPT | Performed by: FAMILY MEDICINE

## 2018-03-17 PROCEDURE — 86850 RBC ANTIBODY SCREEN: CPT | Performed by: FAMILY MEDICINE

## 2018-03-17 PROCEDURE — 83735 ASSAY OF MAGNESIUM: CPT | Performed by: FAMILY MEDICINE

## 2018-03-17 PROCEDURE — 25000128 H RX IP 250 OP 636: Performed by: FAMILY MEDICINE

## 2018-03-17 RX ORDER — HEPARIN SODIUM,PORCINE 10 UNIT/ML
5 VIAL (ML) INTRAVENOUS
Status: DISCONTINUED | OUTPATIENT
Start: 2018-03-17 | End: 2018-03-17 | Stop reason: HOSPADM

## 2018-03-17 RX ORDER — SODIUM CHLORIDE 9 MG/ML
1000 INJECTION, SOLUTION INTRAVENOUS CONTINUOUS
Status: DISCONTINUED | OUTPATIENT
Start: 2018-03-17 | End: 2018-03-17 | Stop reason: HOSPADM

## 2018-03-17 RX ORDER — LIDOCAINE 40 MG/G
CREAM TOPICAL
Status: DISCONTINUED | OUTPATIENT
Start: 2018-03-17 | End: 2018-03-17 | Stop reason: HOSPADM

## 2018-03-17 RX ORDER — ONDANSETRON 2 MG/ML
4 INJECTION INTRAMUSCULAR; INTRAVENOUS ONCE
Status: DISCONTINUED | OUTPATIENT
Start: 2018-03-17 | End: 2018-03-17 | Stop reason: HOSPADM

## 2018-03-17 RX ORDER — HEPARIN SODIUM (PORCINE) LOCK FLUSH IV SOLN 100 UNIT/ML 100 UNIT/ML
100 SOLUTION INTRAVENOUS ONCE
Status: DISCONTINUED | OUTPATIENT
Start: 2018-03-17 | End: 2018-03-17 | Stop reason: HOSPADM

## 2018-03-17 RX ADMIN — SODIUM CHLORIDE 1000 ML: 9 INJECTION, SOLUTION INTRAVENOUS at 10:07

## 2018-03-17 RX ADMIN — SODIUM CHLORIDE 1000 ML: 900 INJECTION, SOLUTION INTRAVENOUS at 14:49

## 2018-03-17 RX ADMIN — SODIUM CHLORIDE 1000 ML: 900 INJECTION, SOLUTION INTRAVENOUS at 13:34

## 2018-03-17 RX ADMIN — SODIUM CHLORIDE, PRESERVATIVE FREE 5 ML: 5 INJECTION INTRAVENOUS at 11:49

## 2018-03-17 ASSESSMENT — ENCOUNTER SYMPTOMS
WEAKNESS: 1
LIGHT-HEADEDNESS: 1
BLOOD IN STOOL: 0
VOMITING: 0
SHORTNESS OF BREATH: 0
ACTIVITY CHANGE: 1
BACK PAIN: 0
CHILLS: 0
NAUSEA: 0
DIZZINESS: 1
APPETITE CHANGE: 1
DYSPHORIC MOOD: 1
FEVER: 0
DIARRHEA: 1
BRUISES/BLEEDS EASILY: 0
DECREASED CONCENTRATION: 1
FLANK PAIN: 0
ABDOMINAL PAIN: 0

## 2018-03-17 ASSESSMENT — PAIN SCALES - GENERAL: PAINLEVEL: NO PAIN (0)

## 2018-03-17 NOTE — ED NOTES
Arrived at ED for hypotension, dizziness and weakness. Diagnosed of colon cancer. Family members at bedside.

## 2018-03-17 NOTE — MR AVS SNAPSHOT
After Visit Summary   3/17/2018    Elizabeth Taylor    MRN: [id number]           Patient Information     Date Of Birth          1940        Visit Information        Provider Department      3/17/2018 10:00 AM  15 ATC;  ONCOLOGY INFUSION [facility]        Today's Diagnoses     Malignant neoplasm of anal canal (H)    -  1      Care Instructions    Contact numbers:  Triage Main Line: [phone]  After hours: [phone]    Call with chills and/or temperature greater than or equal to 100.5 and questions or concerns.    If after hours, weekends, or holidays, call main hospital  at  [phone] and ask for Oncology doctor on call.           March 2018 Sunday Monday Tuesday Wednesday Thursday Friday Saturday                       1     UMP EXTERNAL RADIATION TREATMT    9:00 AM   (15 min.)   [facility] RAD ONC SANIYA   Radiation Oncology Clinic 2     UMP EXTERNAL RADIATION TREATMT    9:00 AM   (15 min.)   [facility] RAD ONC SANIYA   Radiation Oncology Clinic     [facility] MASONIC LAB DRAW    9:15 AM   (15 min.)   [facility] MASONIC LAB DRAW   [facility] Lab Draw     [facility] RETURN    9:45 AM   (30 min.)   Shen Ray MD   [facility] ONC INFUSION 120   10:30 AM   (120 min.)   UC ONCOLOGY INFUSION   [facility] 3       4     5     UMP EXTERNAL RADIATION TREATMT    9:00 AM   (15 min.)   [facility] RAD ONC SANIYA   Radiation Oncology Clinic     [facility] ON TREATMENT VISIT    9:15 AM   (15 min.)   Zaire Madison MD   Radiation Oncology Clinic 6     UMP EXTERNAL RADIATION TREATMT    9:00 AM   (15 min.)   [facility] RAD ONC SANIYA   Radiation Oncology Clinic 7     P EXTERNAL RADIATION TREATMT    9:00 AM   (15 min.)   [facility] RAD ONC SANIYA   Radiation Oncology Clinic     CT ABDOMEN PELVIS W    9:25 AM   (20 min.)   [id number]   [facility] [place], [place] 8     UMP EXTERNAL RADIATION TREATMT    9:00 AM   (15 min.)   [facility] RAD ONC SANIYA   Radiation Oncology Clinic     [facility] NEW    9:30  AM   (60 min.)   June Alonso RD   [facility] 9     UMP EXTERNAL RADIATION TREATMT    9:00 AM   (15 min.)   P RAD ONC SANIYA   Radiation Oncology Clinic     [facility] MASONIC LAB DRAW   12:00 PM   (15 min.)    MASONIC LAB DRAW   [facility] Masonic Lab Draw     UMP RETURN   12:15 PM   (30 min.)   Shen Ray [facility]    1:00 PM   (120 min.)   UC ONCOLOGY INFUSION   [facility] 10       11     12     UMP EXTERNAL RADIATION TREATMT    9:00 AM   (15 min.)   [facility] RAD ONC SANIYA   Radiation Oncology Clinic     FOLLOW UP   10:00 AM   (30 min.)   June Alonso RD   [facility] [place], [facility] 13     [facility] MASONIC LAB DRAW    7:30 AM   (15 min.)    MASONIC LAB DRAW   [facility] Masonic Lab Draw     [facility] ONC INFUSION 120    8:00 AM   (120 min.)   UC ONCOLOGY INFUSION   [facility]     UMP EXTERNAL RADIATION TREATMT    9:00 AM   (15 min.)   [facility] RAD ONC SANIYA   Radiation Oncology Clinic     [facility] ON TREATMENT VISIT    9:15 AM   (15 min.)   Zaire Madison MD   Radiation Oncology Clinic 14     UMP EXTERNAL RADIATION TREATMT    9:00 AM   (15 min.)   [facility] RAD ONC SANIYA   Radiation Oncology Clinic 15     P ON TREATMENT VISIT    9:00 AM   (15 min.)   Zaire Madison MD   Radiation Oncology Clinic     P EXTERNAL RADIATION TREATMT    9:00 AM   (15 min.)   [facility] RAD ONC SANIYA   Radiation Oncology Clinic     P BMT INFUSION 120    1:00 PM   (120 min.)   UC BMT INFUSION   [facility] 16     UMP RETURN    9:00 AM   (30 min.)   [facility] Rad Onc   Radiation Oncology Clinic     P EXTERNAL RADIATION TREATMT    9:00 AM   (15 min.)   [facility] RAD ONC SANIYA   Radiation Oncology Clinic 17     UMP ONC INFUSION 120   10:00 AM   (120 min.)   UC ONCOLOGY INFUSION   [facility]   18     19     UMP EXTERNAL RADIATION TREATMT    9:00 AM   (15 min.)   P RAD  ONC SANIYA   Radiation Oncology Clinic 20     UMP EXTERNAL RADIATION TREATMT    9:00 AM   (15 min.)   UMP RAD ONC SANIYA   Radiation Oncology Clinic 21     UMP EXTERNAL RADIATION TREATMT    9:00 AM   (15 min.)   UMP RAD ONC SANIYA   Radiation Oncology Clinic 22     UMP EXTERNAL RADIATION TREATMT    9:00 AM   (15 min.)   UMP RAD ONC SANIYA   Radiation Oncology Clinic 23     UMP EXTERNAL RADIATION TREATMT    9:00 AM   (15 min.)   [facility] RAD ONC SANIYA   Radiation Oncology Clinic     P MASONIC LAB DRAW    9:45 AM   (15 min.)   UC MASONIC LAB DRAW   [facility] Masonic Lab Draw     UMP RETURN   10:05 AM   (50 min.)   Viktoria Weinberg APRN CNP   [facility] 24       25     26     27     28     29     30     31 April 2018 Sunday Monday Tuesday Wednesday Thursday Friday Saturday   1     2     3     4     5     6     P MASONIC LAB DRAW   10:00 AM   (15 min.)   UC MASONIC LAB DRAW   [facility] Masonic Lab Draw     UMP RETURN   10:15 AM   (30 min.)   Shen Ray MD   [facility] [place] 7       8     9     10     11     12     13     14       15     16     17     18     19     20     21       22     23     24     25     26     27     28       29     30                                           Lab Results:  No results found for this or any previous visit (from the past 12 hour(s)).            Follow-ups after your visit        Your next 10 appointments already scheduled     Mar 19, 2018  9:00 AM CDT   EXTERNAL RADIATION TREATMENT with [facility] RAD ONC SANIYA   Radiation Oncology Clinic ([facility])    [facility]  1st Floor  [address] [place] [address]   [phone]            Mar 20, 2018  9:00 AM CDT   EXTERNAL RADIATION TREATMENT with [facility] RAD ONC SANIYA   Radiation Oncology Clinic ([facility])    [facility]  1st Floor  [address] [place] [address]   [phone]            Mar 21, 2018  9:00 AM  CDT   EXTERNAL RADIATION TREATMENT with [facility] RAD ONC SANIYA   Radiation Oncology Clinic (P [facility] Clin[facility])    [facility]  1st Floor  [address]  [facility] [address]   [phone]            Mar 22, 2018  9:00 AM CDT   EXTERNAL RADIATION TREATMENT with [facility] RAD ONC SANIYA   Radiation Oncology Clinic ([facility])    [facility]  1st Floor  [address]  [facility] [address]   [phone]            Mar 23, 2018  9:00 AM CDT   EXTERNAL RADIATION TREATMENT with [facility] RAD ONC SANIYA   Radiation Oncology Clinic ([facility])    [facility]  1st Floor  [address]  [facility] [address]   [phone]            Mar 23, 2018  9:45 AM CDT   Masonic Lab Draw with  MASONIC LAB DRAW   [facility])    [address]  [address]  [facility] [address]   [phone]            Mar 23, 2018 10:20 AM CDT   (Arrive by 10:05 AM)   Return Visit with GRAHAM Calvillo CNP   [facility] ([facility])    [address]  [address]  [facility] [address] [phone]            Apr 06, 2018 10:00 AM CDT   Masonic Lab Draw with  MASONIC LAB DRAW   [facility])    [address]  [address]  [facility] [id number] [phone]            Apr 06, 2018 10:30 AM CDT   (Arrive by 10:15 AM)   Return Visit with Shen Ray MD   [facility] ([facility])    [address]  [address]  [facility] [address] [phone]              Who to contact     If you have questions or need follow up information about today's clinic visit or your schedule please contact [facility] directly at [phone].  Normal or non-critical lab and imaging results will be communicated to you by  MyChart, letter or phone within 4 business days after the clinic has received the results. If you do not hear from us within 7 days, please contact the clinic through Updatert or phone. If you have a critical or abnormal lab result, we will notify you by phone as soon as possible.  Submit refill requests through LikeWhere or call your pharmacy and they will forward the refill request to us. Please allow 3 business days for your refill to be completed.          Additional Information About Your Visit        LikeWhere Information     LikeWhere gives you secure access to your electronic health record. If you see a primary care provider, you can also send messages to your care team and make appointments. If you have questions, please call your primary care clinic.  If you do not have a primary care provider, please call [phone] and they will assist you.        Care EveryWhere ID     This is your Care EveryWhere ID. This could be used by other organizations to access your [place] medical records  [id number]        Your Vitals Were     Pulse Temperature Respirations Pulse Oximetry BMI (Body Mass Index)       78 97.9  F (36.6  C) (Oral) 18 94% 21.04 kg/m2        Blood Pressure from Last 3 Encounters:   03/17/18 96/61   03/15/18 118/68   03/15/18 121/76    Weight from Last 3 Encounters:   03/17/18 55.2 kg (121 lb 9.6 oz)   03/15/18 50.8 kg (112 lb)   03/13/18 52.9 kg (116 lb 9.6 oz)              Today, you had the following     No orders found for display       Primary Care Provider Office Phone # Fax #    Baron Dread Seth [phone]       [address]  [facility] [address]        Equal Access to Services     IONA DALEY : Hadii chelo Grubbs, waaxda luqadaha, qaybta kaalcyndy lopez. So [facility] [phone].    ATENCIÓN: Si habla español, tiene a ghosh disposición servicios gratuitos de asistencia lingüística. Llame al [phone].    We comply with  applicable federal civil rights laws and [place] laws. We do not discriminate on the basis of race, color, national origin, age, disability, sex, sexual orientation, or gender identity.            Thank you!     Thank you for choosing [facility] CANCER CLINIC  for your care. Our goal is always to provide you with excellent care. Hearing back from our patients is one way we can continue to improve our services. Please take a few minutes to complete the written survey that you may receive in the mail after your visit with us. Thank you!             Your Updated Medication List - Protect others around you: Learn how to safely use, store and throw away your medicines at www.disposemymeds.org.          This list is accurate as of 3/17/18 12:10 PM.  Always use your most recent med list.                   Brand Name Dispense Instructions for use Diagnosis    CALCIUM 500/VITAMIN D PO           hydrocortisone 2.5 % cream    ANUSOL-HC    30 g    Place rectally 2 times daily    External hemorrhoids       lidocaine-prilocaine cream    EMLA    5 g    Apply topically as needed for moderate pain    Malignant neoplasm of anal canal (H)       loperamide 2 MG tablet    LOPERAMIDE A-D    60 tablet    Take 1 tablet (2 mg) by mouth 4 times daily as needed for diarrhea    Malignant neoplasm of anal canal (H)       LORazepam 0.5 MG tablet    ATIVAN    30 tablet    Take 1 tablet (0.5 mg) by mouth every 4 hours as needed (Anxiety, Nausea/Vomiting or Sleep)    Malignant neoplasm of anal canal (H)       magic mouthwash suspension    ENTER INGREDIENTS IN COMMENTS    500 mL    Swish and spit 5-10 mL four times daily as needed    Malignant neoplasm of anal canal (H)       opium tincture dilute (1 mg/mL morphine equivalent)     200 mL    Take 5 mg by mouth every 6 hours as needed for diarrhea.    Malignant neoplasm of anal canal (H)       pramipexole 0.125 MG tablet    MIRAPEX    60 tablet    Take one tablet 2-3 hours before bedtime, may  increase to 2 tablets after one week if needed    Restless leg syndrome       prochlorperazine 10 MG tablet    COMPAZINE    30 tablet    Take 1 tablet (10 mg) by mouth every 6 hours as needed (Nausea/Vomiting)    Malignant neoplasm of anal canal (H)       TYLENOL EXTRA STRENGTH PO      1 TABLET EVERY 4 HOURS AS NEEDED        vitamin B complex with vitamin C Tabs tablet      Take 1 tablet by mouth daily

## 2018-03-17 NOTE — PROGRESS NOTES
Infusion Nursing Note:  Elizabeth Taylor presents for pump d/c and IVF    Note: pt presents for pump d/c and IVF. CADD pump beeping infusion complete, fluorouracil bag empty. Pt arrives in a wheelchair with family friend, pt states she's exhausted and more weak/dizzy than usual, and per family friend, she seemed more 'out of it, confused and disoriented' when she picked up her up, which she states is very unusual. Pt needed assistance transferring from wheelchair to infusion chair, pt unable to stand independently or take any steps without assistance. Pt also lives at home by herself. Pt tells writer she had one large loose stool this morning and one small emesis (she told family friend she many episodes of diarrhea overnight though). HR irregular ranging from 70's-120's, per pt she's had an irregular HR for over 20 years and was told 'it's not the dangerous kind'. Pt hypotensive and orthostatic (see doc flowsheets). Weight actually increased by 9lbs in 2 days, pt states swelling in her BLE has worsened- has lymphedema wraps on. Pt states she has been drinking and eating at home and hasn't had any issues with nausea. No follow up apps scheduled until Friday. On-call MD- Dr. De La Garza notified.    TORB- Dr. De La Garza/Lilian Son RN  --pt should go to Emergency Room after IVF for further workup     This RN called report to ER charge RN    Treatment Conditions:  Not Applicable.    Intravenous Access:  Implanted Port.    Post Infusion Assessment:  Patient tolerated infusion without incident.  Blood return noted pre and post infusion.  Port needle heparin locked and left intact.    Discharge Plan:   Patient declined prescription refills.  Discharge instructions reviewed with: Patient and Family.  Patient and/or family verbalized understanding of discharge instructions and all questions answered.  Copy of AVS reviewed with patient and/or family.    Patient discharged in stable condition accompanied by: self and family  friends- both pt and family friends in agreement of decision to go to ER.  Departure Mode: Wheelchair.  Face to Face time: 5 minutes.    Lilian Son RN

## 2018-03-17 NOTE — PATIENT INSTRUCTIONS
Contact numbers:  Triage Main Line: [phone]  After hours: [phone]    Call with chills and/or temperature greater than or equal to 100.5 and questions or concerns.    If after hours, weekends, or holidays, call main hospital  at  [phone] and ask for Oncology doctor on call.           March 2018 Sunday Monday Tuesday Wednesday Thursday Friday Saturday                       1     [facility] EXTERNAL RADIATION TREATMT    9:00 AM   (15 min.)   [facility] RAD ONC [facility]   Radiation Oncology Clinic 2     [facility] EXTERNAL RADIATION TREATMT    9:00 AM   (15 min.)   [facility] RAD ONC SANIYA   Radiation Oncology Clinic     [facility] DRAW    9:15 AM   (15 min.)    [facility] DRAW   [facility] Lab Draw     [facility] RETURN    9:45 AM   (30 min.)   Shne Ray MD   [facility] ONC INFUSION 120   10:30 AM   (120 min.)   [facility] ONCOLOGY INFUSION   [facility] 3       4     5     [facility] EXTERNAL RADIATION TREATMT    9:00 AM   (15 min.)   [facility] RAD ONC SANIYA   Radiation Oncology Clinic     [facility] ON TREATMENT VISIT    9:15 AM   (15 min.)   Zaire Madison MD   [facility] 6     P EXTERNAL RADIATION TREATMT    9:00 AM   (15 min.)   [facility] RAD ONC SANIYA   Rad[facility] Oncology Clinic 7     P EXTERNAL RADIATION TREATMT    9:00 AM   (15 min.)   [facility] RAD ONC SANIYA   Radiation Oncology Clinic     CT ABDOMEN PELVIS W    9:25 AM   (20 min.)   [id number]   [facility] [place], [place] 8     [facility] EXTERNAL RADIATION TREATMT    9:00 AM   (15 min.)   [facility] RAD ONC SANIYA   Radiation Oncology Clinic     P NEW    9:30 AM   (60 min.)   June Alonso RD   [facility] 9     [facility] EXTERNAL RADIATION TREATMT    9:00 AM   (15 min.)   [facility] RAD ONC SANIYA   Radiation Oncology Clinic     [facility] DRAW   12:00 PM   (15 min.)   [facility]   [facility] Draw     [facility] RETURN   12:15 PM   (30 min.)   Shen Ray MD   [facility] ONC  INFUSION 120    1:00 PM   (120 min.)   [facility] ONCOLOGY INFUSION   [facility] 10       11     12     [facility] EXTERNAL RADIATION TREATMT    9:00 AM   (15 min.)   [facility] RAD ONC SANIYA   Radiation Oncology Clinic     FOLLOW UP   10:00 AM   (30 min.)   June Alonso RD   [facility] [place], Nutrition Services 13     [facility] DRAW    7:30 AM   (15 min.)    [facility] DRAW   [facility]     P ONC INFUSION 120    8:00 AM   (120 min.)   [facility] ONCOLOGY INFUSION   [facility]     [facility] EXTERNAL RADIATION TREATMT    9:00 AM   (15 min.)   P RAD ONC [facility]YA   Radiation Oncology Clinic     [facility] ON TREATMENT VISIT    9:15 AM   (15 min.)   Zaire Madison MD   [facility] 14     [facility] EXTERNAL RADIATION TREATMT    9:00 AM   (15 min.)   [facility] RAD ONC SANIYA   Radiation Oncology Clinic 15     [facility] ON TREATMENT VISIT    9:00 AM   (15 min.)   Zaire Madison MD   Radiation Oncology Clinic     [facility] EXTERNAL RADIATION TREATMT    9:00 AM   (15 min.)   [facility] RAD ONC SANIYA   Radiation Oncology Clinic     P BMT INFUSION 120    1:00 PM   (120 min.)   [facility] BMT INFUSION   [facility] Blood and Marrow Transplant 16     [facility] RETURN    9:00 AM   (30 min.)   Nurse, [facility] Rad Onc   Radiation Oncology Clinic     [facility] EXTERNAL RADIATION TREATMT    9:00 AM   (15 min.)   [facility] RAD ONC SANIYA   Radiation Oncology Clinic 17     [facility] ONC INFUSION 120   10:00 AM   (120 min.)   [facility] ONCOLOGY INFUSION   [facility]   18     19     [facility] EXTERNAL RADIATION TREATMT    9:00 AM   (15 min.)   P RAD ONC [facility]IYA   [facility]cology Clinic 20     [facility] EXTERNAL RADIATION TREATMT    9:00 AM   (15 min.)   P RAD ONC [facility]IYA   [facility]cology Clinic 21     [facility] EXTERNAL RADIATION TREATMT    9:00 AM   (15 min.)   P RAD ONC [facility]IYA   Radiation Oncology Clinic 22     [facility] EXTERNAL RADIATION TREATMT    9:00 AM   (15 min.)   P RAD ONC [facility]IYA   [facility] Oncology Clinic 23     [facility] EXTERNAL  RADIATION TREATMT    9:00 AM   (15 min.)   [facility] RAD ONC SANIYA   Radiation Oncology Clinic     [facility] DRAW    9:45 AM   (15 min.)    [facility] DRAW   [facility] Lab Draw     [facility] RETURN   10:05 AM   (50 min.)   Viktoria Weinberg APRN CNP   [facility] 24       25     26     27     28     29     30     31 April 2018 Sunday Monday Tuesday Wednesday Thursday Friday Saturday   1     2     3     4     5     6     [facility]   10:00 AM   (15 min.)   [facility]   [facility] Lab Draw     [facility] RETURN   10:15 AM   (30 min.)   Shen Ray MD   [facility] 7       8     9     10     11     12     13     14       15     16     17     18     19     20     21       22     23     24     25     26     27     28       29     30                                           Lab Results:  No results found for this or any previous visit (from the past 12 hour(s)).

## 2018-03-17 NOTE — ED AVS SNAPSHOT
[facility], Emergency Department    [address] [address]    Phone:  [phone]                                       Elizabeth Taylor   MRN: [id number]    Department:  [facility], [place], Emergency Department   Date of Visit:  3/17/2018           Patient Information     Date Of Birth          1940        Your diagnoses for this visit were:     Dehydration     Weakness generalized     Diarrhea, unspecified type     Malignant neoplasm of anal canal (H)        You were seen by Giorgio Fontanez MD.      Follow-up Information     Follow up with Felisha Davis NP. Call in 2 days.    Specialty:  Family Practice    Contact information:    [address] [place]  [facility] [id number] [phone]          Discharge Instructions       Home.  Make sure to encourage fluids.  Eat as tolerated soups, bananas, apple sauce, toast etc.  Return if any concerns.  Contact MD Monday.    Dehydration    The human body is comprised largely of water. If you lose more fluids than you take in, you can become dehydrated. This means there are not enough fluids in your body for it to function right. Mild dehydration can cause weakness, confusion, or muscle cramps. In extreme cases, it can lead to brain damage and even death. That's why prompt treatment is crucial.  Risk factors  Anyone can become dehydrated. But infants, children, and older adults are at greatest risk. You are most likely to lose fluids with severe vomiting, diarrhea, or a fever. Exercising or working hard--especially in hot weather--can also cause excess fluid loss.  What to do  Drinking liquids is the best way to prevent dehydration. Water is best, but juice or frozen pops can also help. For adults, don't use liquids that contain caffeine or alcohol to rehydrate. Your doctor may suggest electrolyte solutions for sick infants and young children.  When to go to the emergency room (ER)  Go to an ER right away for these symptoms:  Adults    Very dark  urine and little urine output    Dizziness, weakness, confusion, fainting  Children    Sunken eyes    Little or no urine output (for infants, no wet diaper in 8 hours)    Very dark urine    Skin that doesn't bounce back quickly when pinched    Crying without tears    Lethargy, decreased activity, or increased sleepiness  What to expect in the emergency room  Your blood pressure, temperature, and heart rate will be checked. You may have blood or urine tests. The main treatment for dehydration is fluids. You may be given these to drink. Or, you may receive them through a vein in your arm. You also may be treated for diarrhea, vomiting, or a high fever.   Date Last Reviewed: 7/1/2017 2000-2017 [employer]. [address] [place] [address], [place] [address]. All rights reserved. This information is not intended as a substitute for professional medical care. Always follow your healthcare professional's instructions.          Treating Diarrhea    Diarrhea happens when you have loose, watery, or frequent bowel movements. It is a common problem with many causes. Most cases of diarrhea clear up on their own. But certain cases may need treatment. Be sure to see your healthcare provider if your symptoms do not improve within a few days.  Getting relief  Treatment of diarrhea depends on its cause. Diarrhea caused by bacterial or parasite infection is often treated with antibiotics. Diarrhea caused by other factors, such as a stomach virus, often improves with simple home treatment. The tips below may also help relieve your symptoms.    Drink plenty of fluids. This helps prevent too much fluid loss (dehydration). Water, clear soups, and electrolyte solutions are good choices. Avoid alcohol, coffee, tea, and milk. These can irritate your intestines and make symptoms worse.    Suck on ice chips if drinking makes you queasy.    Return to your normal diet slowly. You may want to eat bland foods at first, such as rice and  toast. Also, you may need to avoid certain foods for a while, such as dairy products. These can make symptoms worse. Ask your healthcare provider if there are any other foods you should avoid.    If you were prescribed antibiotics, take them as directed.    Do not take anti-diarrhea medicines without asking your healthcare provider first.  Call your healthcare provider   Call your healthcare provider if you have any of the following:     A fever of 100.4 F (38.0 C) or higher, or as directed by your healthcare provider    Severe pain    Worsening diarrhea or diarrhea for more than 2 days    Bloody vomit or stool    Signs of dehydration (dizziness, dry mouth and tongue, rapid pulse, dark urine)  Date Last Reviewed: 7/1/2016 2000-2017 [employer]. [address] [place] [address], [place] [address]. All rights reserved. This information is not intended as a substitute for professional medical care. Always follow your healthcare professional's instructions.          Future Appointments        Provider Department Dept Phone Center    3/19/2018 9:00 AM [facility] RAD ONC [facility] Radiation Oncology Clinic [phone] [facility]    3/20/2018 9:00 AM [facility] RAD ONC [facility] Radiation Oncology Clinic [phone] [facility]    3/21/2018 9:00 AM [facility] RAD ONC [facility] Radiation Oncology Clinic [phone] [facility]    3/22/2018 9:00 AM [facility] RAD ONC [facility] Radiation Oncology Clinic [phone] [facility]    3/23/2018 9:00 AM [facility] RAD ONC [facility] Radiation Oncology Clinic [phone] [facility]    3/23/2018 9:45 AM [facility] Lab Draw [phone] [facility]    3/23/2018 10:20 AM GRAHAM Diaz CNP [facility] Cancer [facility] [phone] [facility]    4/6/2018 10:00 AM [facility] Lab Draw [phone] [facility]    4/6/2018 10:30 AM Shen Ray MD [facility] Cancer [place] [address] [phone] [facility]      24 Hour Appointment Hotline       To make an appointment at any [facility], call  [phone] ([phone]). If you don't have a family doctor or clinic, we will help you find one. [place] clinics are conveniently located to serve the needs of you and your family.             Review of your medicines      Our records show that you are taking the medicines listed below. If these are incorrect, please call your family doctor or clinic.        Dose / Directions Last dose taken    CALCIUM 500/VITAMIN D PO        Refills:  0        hydrocortisone 2.5 % cream   Commonly known as:  ANUSOL-HC   Quantity:  30 g        Place rectally 2 times daily   Refills:  1        lidocaine-prilocaine cream   Commonly known as:  EMLA   Quantity:  5 g        Apply topically as needed for moderate pain   Refills:  1        loperamide 2 MG tablet   Commonly known as:  LOPERAMIDE A-D   Dose:  2 mg   Quantity:  60 tablet        Take 1 tablet (2 mg) by mouth 4 times daily as needed for diarrhea   Refills:  3        LORazepam 0.5 MG tablet   Commonly known as:  ATIVAN   Dose:  0.5 mg   Quantity:  30 tablet        Take 1 tablet (0.5 mg) by mouth every 4 hours as needed (Anxiety, Nausea/Vomiting or Sleep)   Refills:  1        magic mouthwash suspension   Commonly known as:  ENTER INGREDIENTS IN COMMENTS   Quantity:  500 mL        Swish and spit 5-10 mL four times daily as needed   Refills:  1        opium tincture dilute (1 mg/mL morphine equivalent)   Quantity:  200 mL        Take 5 mg by mouth every 6 hours as needed for diarrhea.   Refills:  0        pramipexole 0.125 MG tablet   Commonly known as:  MIRAPEX   Quantity:  60 tablet        Take one tablet 2-3 hours before bedtime, may increase to 2 tablets after one week if needed   Refills:  5        prochlorperazine 10 MG tablet   Commonly known as:  COMPAZINE   Dose:  10 mg   Quantity:  30 tablet        Take 1 tablet (10 mg) by mouth every 6 hours as needed (Nausea/Vomiting)   Refills:  1        TYLENOL EXTRA STRENGTH PO        1 TABLET EVERY 4 HOURS AS NEEDED    Refills:  0        vitamin B complex with vitamin C Tabs tablet   Dose:  1 tablet        Take 1 tablet by mouth daily   Refills:  0                Procedures and tests performed during your visit     ABO/Rh type and screen    Ammonia    Blood culture    CBC with platelets differential    Cardiac Continuous Monitoring    Comprehensive metabolic panel    EKG 12-lead, tracing only    INR    Lactic acid whole blood    Magnesium    Orthostatic blood pressure and pulse    Partial thromboplastin time    Pulse oximetry nursing    TSH    Troponin I    UA with Microscopic reflex to Culture    XR Chest Port 1 View      Orders Needing Specimen Collection     None      Pending Results     Date and Time Order Name Status Description    3/17/2018 1249 EKG 12-lead, tracing only Preliminary     3/17/2018 1249 Blood culture Preliminary             Pending Culture Results     Date and Time Order Name Status Description    3/17/2018 1249 Blood culture Preliminary             Pending Results Instructions     If you had any lab results that were not finalized at the time of your Discharge, you can call the ED Lab Result RN at [phone]. You will be contacted by this team for any positive Lab results or changes in treatment. The nurses are available 7 days a week from 10A to 6:30P.  You can leave a message 24 hours per day and they will return your call.        Thank you for choosing [facility]       Thank you for choosing [facility] for your care. Our goal is always to provide you with excellent care. Hearing back from our patients is one way we can continue to improve our services. Please take a few minutes to complete the written survey that you may receive in the mail after you visit with us. Thank you!        [employer] Information     [employer] gives you secure access to your electronic health record. If you see a primary care provider, you can also send messages to your care team and make appointments. If you have questions, please  call your primary care clinic.  If you do not have a primary care provider, please call [phone] and they will assist you.        Care EveryWhere ID     This is your Care EveryWhere ID. This could be used by other organizations to access your [place] medical records  [id number]        Equal Access to Services     IONA DALEY : Josué Grubbs, wajovi ascencio, melida rangelalcyndy lopez. So [facility] [phone].    ATENCIÓN: Si habla español, tiene a ghosh disposición servicios gratuitos de asistencia lingüística. Llame al [phone].    We comply with applicable federal civil rights laws and [place] laws. We do not discriminate on the basis of race, color, national origin, age, disability, sex, sexual orientation, or gender identity.            After Visit Summary       This is your record. Keep this with you and show to your community pharmacist(s) and doctor(s) at your next visit.

## 2018-03-17 NOTE — ED PROVIDER NOTES
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  Today, the patient presented to her infusion center to have her fluorouracil CADD pump discontinued, and while there the patient endorsed her presenting symptoms as well as ongoing diarrhea.  The patient was started on opium on 3/15/2018 in an attempt to control the diarrhea, and though the patient states that this has been lessened since initiating that medication, her stools have still been loose.  The patient did receive IVF both today and yesterday due to concern for dehydration, and she currently states that her BLEs are somewhat more edematous than normal.  Per the infusion center note, the patient's weight has actually increased 9 pounds in the past 2 days.  She denies any fevers or abdominal pain.        PAST MEDICAL HISTORY:   Past Medical History:   Diagnosis Date     Endometriosis, site unspecified      Thyroiditis, unspecified     Thryoiditis-resolved       PAST SURGICAL HISTORY:   Past Surgical History:   Procedure Laterality Date     APPENDECTOMY      as a child     COLONOSCOPY N/A 4/13/2017    Procedure: COLONOSCOPY;  Surgeon: Juan Dos Santos MD;  Location: [facility]     INSERT PORT VASCULAR ACCESS Right 2/8/2018    Procedure: INSERT PORT VASCULAR ACCESS;  Place Single Lumen Venous Chest Port Placement;  Surgeon: Zaire Moreira PA-C;  Location: [facility]     SURGICAL HISTORY OF -       endometriosis       FAMILY HISTORY:   Family History   Problem Relation Age of Onset     CANCER Sister      CANCER Maternal Grandmother      lymphoma     HEART DISEASE Father      MI     Uterine Cancer Niece        SOCIAL HISTORY:   Social History   Substance Use Topics      Smoking status: Never Smoker     Smokeless tobacco: Never Used     Alcohol use No       Discharge Medication List as of 3/17/2018  7:02 PM      CONTINUE these medications which have NOT CHANGED    Details   loperamide (LOPERAMIDE A-D) 2 MG tablet Take 1 tablet (2 mg) by mouth 4 times daily as needed for diarrhea, Disp-60 tablet, R-3, Local Print      opium tincture dilute, 1 mg/mL morphine equivalent, Take 5 mg by mouth every 6 hours as needed for diarrhea., Disp-200 mL, R-0, Local Print      lidocaine-prilocaine (EMLA) cream Apply topically as needed for moderate painDisp-5 g, [id number]      magic mouthwash (ENTER INGREDIENTS IN COMMENTS) suspension Swish and spit 5-10 mL four times daily as needed, Disp-500 mL, R-1, No Print Outlidocaine visc 2% 2.5mL/5mL & maalox/mylanta w/ simeth 2.5mL/5mL & diphenhydramine 5mg/5mL    Called to [facility]      LORazepam (ATIVAN) 0.5 MG tablet Take 1 tablet (0.5 mg) by mouth every 4 hours as needed (Anxiety, Nausea/Vomiting or Sleep), Disp-30 tablet, R-1, No Print OutPrescription called into [facility]ist, [facility].      prochlorperazine (COMPAZINE) 10 MG tablet Take 1 tablet (10 mg) by mouth every 6 hours as needed (Nausea/Vomiting), Disp-30 tablet, R-1, E-Prescribe      vitamin B complex with vitamin C (VITAMIN  B COMPLEX) TABS tablet Take 1 tablet by mouth daily, Historical      Calcium Carbonate-Vitamin D (CALCIUM 500/VITAMIN D PO) Historical      hydrocortisone (ANUSOL-HC) 2.5 % cream Place rectally 2 times dailyDisp-30 g, [id number]      pramipexole (MIRAPEX) 0.125 MG tablet Take one tablet 2-3 hours before bedtime, may increase to 2 tablets after one week if needed, Disp-60 tablet, R-5, E-Prescribe      TYLENOL EXTRA STRENGTH OR 1 TABLET EVERY 4 HOURS AS NEEDED, Oral, Historical                Allergies   Allergen Reactions     Darvon [Propoxyphene]      Had reaction in teen years     Penicillins      As a child     Ketoconazole  Rash        I have reviewed the Medications, Allergies, Past Medical and Surgical History, and Social History in the Epic system.    Review of Systems   Constitutional: Positive for activity change and appetite change. Negative for chills and fever.   Respiratory: Negative for shortness of breath.    Cardiovascular: Positive for leg swelling. Negative for chest pain.   Gastrointestinal: Positive for diarrhea. Negative for abdominal pain, blood in stool, nausea and vomiting.   Genitourinary: Negative for flank pain and urgency.   Musculoskeletal: Negative for back pain.   Skin: Negative for rash.   Allergic/Immunologic: Positive for immunocompromised state.   Neurological: Positive for dizziness, weakness (generalized) and light-headedness.   Hematological: Does not bruise/bleed easily.   Psychiatric/Behavioral: Positive for decreased concentration and dysphoric mood.   All other systems reviewed and are negative.      Physical Exam   BP: 90/53  Pulse: 91  Heart Rate: 91  Temp: 98.3  F (36.8  C)  Resp: 16  Weight: 55.2 kg (121 lb 11.1 oz)  SpO2: 99 %  Lying Orthostatic BP: 99/60  Lying Orthostatic Pulse: 88 bpm  Sitting Orthostatic BP: 92/58  Sitting Orthostatic Pulse: 84 bpm  Standing Orthostatic BP: 94/63  Standing Orthostatic Pulse: 87 bpm      Physical Exam   Constitutional: She is oriented to person, place, and time. She appears well-developed and well-nourished. She appears distressed.   Patient brought in by friends here she is alert and oriented feeling generally weak though was not obviously confused.   HENT:   Head: Normocephalic and atraumatic.   Dry mucous membranes are noted examination.   Eyes: EOM are normal. Pupils are equal, round, and reactive to light. No scleral icterus.   Neck: Normal range of motion. Neck supple. No JVD present.   Cardiovascular: Regular rhythm.    Pulmonary/Chest: No stridor. No respiratory distress.   Abdominal: She exhibits distension. There is no tenderness. There is no  rebound and no guarding.   Abdomen is mildly distended soft no rebound guarding rigidity.   Musculoskeletal: She exhibits edema. She exhibits no tenderness or deformity.   Bilateral dependent lower extremity edema noted   Neurological: She is alert and oriented to person, place, and time. She has normal reflexes. No cranial nerve deficit. Coordination normal.   Skin: Skin is warm and dry. No rash noted. She is not diaphoretic. No erythema. No pallor.   Psychiatric:   Mildly flattened otherwise appropriate.   Nursing note and vitals reviewed.      ED Course     ED Course     Procedures           Patient here in the ER was assessed Port-A-Cath is been accessed already.  Patient received 2 L normal saline in the ER.  EKG evaluated without any hyperacute ischemic changes.  Laboratory testing lactic acid 0.7.  Ammonia 36.  TSH 3.87 troponin 0.035.  Magnesium 1.3.  Glucose 106.  Urinalysis notes 9 white cells  CBC shows white count of 1.3.  Absolute neutrophils are 1.3.  hemoglobin is 12 platelets 178.  Chest x-ray is done also no obvious infiltrate seen some atelectasis in the bases noted.  Patient after 2 L of fluid feeling much better is taking orally eating soup broth here in the ER is up standing her blood pressures improved she denies any lightheadedness friends are here also.  Patient is comfortable wanting to go home.  Discussed with the oncology fellow who agree with plan at this point.  Patient will be discharged she does take the paregoric for her diarrhea that may be related to her 5-fluorouracil which she just finished for day treatment.  Hopefully this will improve.  Patient was discharged with friends with instructions regarding hydration encouraging diet follow-up with MD return if any concerns patient agrees with plan of discharge.       EKG Interpretation:      Interpreted by Dr. Fontanez  Time reviewed:  1:05 PM  Symptoms at time of EKG: hypotension  Rhythm: sinus arhythmia   Rate: normal  Axis:  normal  Ectopy: none  Conduction: normal  ST Segments/ T Waves: No ST-T wave changes  Q Waves: none  Comparison to prior: No old EKG available    Clinical Impression: normal EKG          Critical Care time:  none             Labs Ordered and Resulted from Time of ED Arrival Up to the Time of Departure from the ED   CBC WITH PLATELETS DIFFERENTIAL - Abnormal; Notable for the following:        Result Value    WBC 1.3 (*)     Absolute Neutrophil 1.3 (*)     Absolute Lymphocytes 0.0 (*)     All other components within normal limits   INR - Abnormal; Notable for the following:     INR 1.46 (*)     All other components within normal limits   COMPREHENSIVE METABOLIC PANEL - Abnormal; Notable for the following:     Glucose 106 (*)     Calcium 7.7 (*)     Albumin 1.6 (*)     Protein Total 4.4 (*)     All other components within normal limits   MAGNESIUM - Abnormal; Notable for the following:     Magnesium 1.3 (*)     All other components within normal limits   ROUTINE UA WITH MICROSCOPIC REFLEX TO CULTURE - Abnormal; Notable for the following:     Ketones Urine 10 (*)     Protein Albumin Urine 10 (*)     Leukocyte Esterase Urine Trace (*)     WBC Urine 9 (*)     Mucous Urine Present (*)     All other components within normal limits   PARTIAL THROMBOPLASTIN TIME   TROPONIN I   TSH   AMMONIA   LACTIC ACID WHOLE BLOOD   ORTHOSTATIC BLOOD PRESSURE AND PULSE   CARDIAC CONTINUOUS MONITORING   PULSE OXIMETRY NURSING   ABO/RH TYPE AND SCREEN   BLOOD CULTURE     Results for orders placed or performed during the hospital encounter of 03/17/18   XR Chest Port 1 View    Narrative    Exam: XR CHEST PORT 1 VW, 3/17/2018 1:39 PM    Indication: hypotension and confusion;     Comparison: 3/7/2018 CT abdomen/pelvis; 1/18/2018 PET/CT    Findings:   Right internal jugular Port-A-Cath tip at the atriocaval junction. The  cardiomediastinal silhouette and pulmonary vasculature are within  normal limits. Probable small bilateral pleural effusions  with  adjacent opacities. Pulmonary vascularity is plethoric. Biapical  scarring. No pneumothorax. Osteopenic appearance of the bones.      Impression    Impression:   Pulmonary vascular congestion with probable small bilateral pleural  effusions and basilar atelectasis versus consolidation.    I have personally reviewed the examination and initial interpretation  and I agree with the findings.    SHARON PAREKH MD   CBC with platelets differential   Result Value Ref Range    WBC 1.3 (L) 4.0 - 11.0 10e9/L    RBC Count 3.89 3.8 - 5.2 10e12/L    Hemoglobin 12.0 11.7 - 15.7 g/dL    Hematocrit 35.9 35.0 - 47.0 %    MCV 92 78 - 100 fl    MCH 30.8 26.5 - 33.0 pg    MCHC 33.4 31.5 - 36.5 g/dL    RDW 14.7 10.0 - 15.0 %    Platelet Count 178 150 - 450 10e9/L    Diff Method Manual Differential     % Neutrophils 97.0 %    % Lymphocytes 2.0 %    % Monocytes 0.0 %    % Eosinophils 1.0 %    % Basophils 0.0 %    Absolute Neutrophil 1.3 (L) 1.6 - 8.3 10e9/L    Absolute Lymphocytes 0.0 (L) 0.8 - 5.3 10e9/L    Absolute Monocytes 0.0 0.0 - 1.3 10e9/L    Absolute Eosinophils 0.0 0.0 - 0.7 10e9/L    Absolute Basophils 0.0 0.0 - 0.2 10e9/L   Partial thromboplastin time   Result Value Ref Range    PTT 29 22 - 37 sec   INR   Result Value Ref Range    INR 1.46 (H) 0.86 - 1.14   Comprehensive metabolic panel   Result Value Ref Range    Sodium 134 133 - 144 mmol/L    Potassium 3.4 3.4 - 5.3 mmol/L    Chloride 104 94 - 109 mmol/L    Carbon Dioxide 21 20 - 32 mmol/L    Anion Gap 9 3 - 14 mmol/L    Glucose 106 (H) 70 - 99 mg/dL    Urea Nitrogen 15 7 - 30 mg/dL    Creatinine 0.79 0.52 - 1.04 mg/dL    GFR Estimate 71 >60 mL/min/1.7m2    GFR Estimate If Black 85 >60 mL/min/1.7m2    Calcium 7.7 (L) 8.5 - 10.1 mg/dL    Bilirubin Total 1.0 0.2 - 1.3 mg/dL    Albumin 1.6 (L) 3.4 - 5.0 g/dL    Protein Total 4.4 (L) 6.8 - 8.8 g/dL    Alkaline Phosphatase 49 40 - 150 U/L    ALT 25 0 - 50 U/L    AST 27 0 - 45 U/L   Magnesium   Result Value Ref Range     Magnesium 1.3 (L) 1.6 - 2.3 mg/dL   Troponin I   Result Value Ref Range    Troponin I ES 0.035 0.000 - 0.045 ug/L   TSH   Result Value Ref Range    TSH 3.87 0.40 - 4.00 mU/L   Ammonia   Result Value Ref Range    Ammonia 36 10 - 50 umol/L   Lactic acid whole blood   Result Value Ref Range    Lactic Acid 0.7 0.7 - 2.0 mmol/L   UA with Microscopic reflex to Culture   Result Value Ref Range    Color Urine Yellow     Appearance Urine Clear     Glucose Urine Negative NEG^Negative mg/dL    Bilirubin Urine Negative NEG^Negative    Ketones Urine 10 (A) NEG^Negative mg/dL    Specific Gravity Urine 1.015 1.003 - 1.035    Blood Urine Negative NEG^Negative    pH Urine 5.5 5.0 - 7.0 pH    Protein Albumin Urine 10 (A) NEG^Negative mg/dL    Urobilinogen mg/dL Normal 0.0 - 2.0 mg/dL    Nitrite Urine Negative NEG^Negative    Leukocyte Esterase Urine Trace (A) NEG^Negative    Source Clean catch urine     WBC Urine 9 (H) 0 - 5 /HPF    RBC Urine 2 0 - 2 /HPF    Squamous Epithelial /HPF Urine 1 0 - 1 /HPF    Mucous Urine Present (A) NEG^Negative /LPF   EKG 12-lead, tracing only   Result Value Ref Range    Interpretation ECG Click View Image link to view waveform and result    ABO/Rh type and screen   Result Value Ref Range    ABO AB     RH(D) Pos     Antibody Screen Neg     Test Valid Only At          [facility]    Specimen Expires 03/20/2018    Blood culture   Result Value Ref Range    Specimen Description Blood Portacath     Culture Micro No growth after 5 hours             Assessments & Plan (with Medical Decision Making)  77-year-old female history of anal cancer just finished 4 day treatment of fluorouracil.  Patient has had some diarrhea nonbloody.  She was given paregoric to treat this.  Patient had infusion of fluids yesterday today with increasing diarrhea overnight some clear.  Patient was somewhat weak mildly hypotensive that improved with IV fluids here in the ER.  Patient has  no abdominal pain other labs are stable including EKG etc.  Patient is taking orally wants to go home discussed with oncology fellow.  More likely dehydration noninfectious this may be chemotherapy related patient will be discharged home with friends were comfortable with her going home she will follow-up with MD return if any worsening symptoms.           I have reviewed the nursing notes.    I have reviewed the findings, diagnosis, plan and need for follow up with the patient.    Discharge Medication List as of 3/17/2018  7:02 PM          Final diagnoses:   Dehydration   Weakness generalized   Diarrhea, unspecified type   Malignant neoplasm of anal canal (H)   I, Chidi Renteria, am serving as a trained medical scribe to document services personally performed by Giorgio Fontanez MD, based on the provider's statements to me.      I, Giorgio Fontanez MD, was physically present and have reviewed and verified the accuracy of this note documented by Chidi Renteria.       3/17/2018   [facility], EMERGENCY DEPARTMENT    This note was created at least in part by the use of dragon voice dictation system. Inadvertent typographical errors may still exist.  Giorgio Fontanez MD.         Giorgio Fontanez MD  03/17/18 2002

## 2018-03-17 NOTE — DISCHARGE INSTRUCTIONS
Home.  Make sure to encourage fluids.  Eat as tolerated soups, bananas, apple sauce, toast etc.  Return if any concerns.  Contact MD Monday.    Dehydration    The human body is comprised largely of water. If you lose more fluids than you take in, you can become dehydrated. This means there are not enough fluids in your body for it to function right. Mild dehydration can cause weakness, confusion, or muscle cramps. In extreme cases, it can lead to brain damage and even death. That's why prompt treatment is crucial.  Risk factors  Anyone can become dehydrated. But infants, children, and older adults are at greatest risk. You are most likely to lose fluids with severe vomiting, diarrhea, or a fever. Exercising or working hard--especially in hot weather--can also cause excess fluid loss.  What to do  Drinking liquids is the best way to prevent dehydration. Water is best, but juice or frozen pops can also help. For adults, don't use liquids that contain caffeine or alcohol to rehydrate. Your doctor may suggest electrolyte solutions for sick infants and young children.  When to go to the emergency room (ER)  Go to an ER right away for these symptoms:  Adults    Very dark urine and little urine output    Dizziness, weakness, confusion, fainting  Children    Sunken eyes    Little or no urine output (for infants, no wet diaper in 8 hours)    Very dark urine    Skin that doesn't bounce back quickly when pinched    Crying without tears    Lethargy, decreased activity, or increased sleepiness  What to expect in the emergency room  Your blood pressure, temperature, and heart rate will be checked. You may have blood or urine tests. The main treatment for dehydration is fluids. You may be given these to drink. Or, you may receive them through a vein in your arm. You also may be treated for diarrhea, vomiting, or a high fever.   Date Last Reviewed: 7/1/2017 2000-2017 [employer]. [address] [facility], [place]  [address]. All rights reserved. This information is not intended as a substitute for professional medical care. Always follow your healthcare professional's instructions.          Treating Diarrhea    Diarrhea happens when you have loose, watery, or frequent bowel movements. It is a common problem with many causes. Most cases of diarrhea clear up on their own. But certain cases may need treatment. Be sure to see your healthcare provider if your symptoms do not improve within a few days.  Getting relief  Treatment of diarrhea depends on its cause. Diarrhea caused by bacterial or parasite infection is often treated with antibiotics. Diarrhea caused by other factors, such as a stomach virus, often improves with simple home treatment. The tips below may also help relieve your symptoms.    Drink plenty of fluids. This helps prevent too much fluid loss (dehydration). Water, clear soups, and electrolyte solutions are good choices. Avoid alcohol, coffee, tea, and milk. These can irritate your intestines and make symptoms worse.    Suck on ice chips if drinking makes you queasy.    Return to your normal diet slowly. You may want to eat bland foods at first, such as rice and toast. Also, you may need to avoid certain foods for a while, such as dairy products. These can make symptoms worse. Ask your healthcare provider if there are any other foods you should avoid.    If you were prescribed antibiotics, take them as directed.    Do not take anti-diarrhea medicines without asking your healthcare provider first.  Call your healthcare provider   Call your healthcare provider if you have any of the following:     A fever of 100.4 F (38.0 C) or higher, or as directed by your healthcare provider    Severe pain    Worsening diarrhea or diarrhea for more than 2 days    Bloody vomit or stool    Signs of dehydration (dizziness, dry mouth and tongue, rapid pulse, dark urine)  Date Last Reviewed: 7/1/2016 2000-2017 [employer],  [facility]. [address] [place] [id number]. All rights reserved. This information is not intended as a substitute for professional medical care. Always follow your healthcare professional's instructions.

## 2018-03-18 LAB — INTERPRETATION ECG - MUSE: NORMAL

## 2018-03-19 ENCOUNTER — OFFICE VISIT (OUTPATIENT)
Dept: RADIATION ONCOLOGY | Facility: CLINIC | Age: 78
End: 2018-03-19
Attending: RADIOLOGY
Payer: MEDICARE

## 2018-03-19 ENCOUNTER — HOSPITAL ENCOUNTER (INPATIENT)
Facility: CLINIC | Age: 78
LOS: 16 days | Discharge: NURSING, REHAB OR LONG-TERM CARE | DRG: 393 | End: 2018-04-04
Attending: FAMILY MEDICINE | Admitting: INTERNAL MEDICINE
Payer: MEDICARE

## 2018-03-19 ENCOUNTER — APPOINTMENT (OUTPATIENT)
Dept: GENERAL RADIOLOGY | Facility: CLINIC | Age: 78
DRG: 393 | End: 2018-03-19
Attending: FAMILY MEDICINE
Payer: MEDICARE

## 2018-03-19 ENCOUNTER — TRANSFERRED RECORDS (OUTPATIENT)
Dept: HEALTH INFORMATION MANAGEMENT | Facility: CLINIC | Age: 78
End: 2018-03-19

## 2018-03-19 VITALS
HEART RATE: 82 BPM | DIASTOLIC BLOOD PRESSURE: 77 MMHG | SYSTOLIC BLOOD PRESSURE: 111 MMHG | WEIGHT: 130.8 LBS | BODY MASS INDEX: 22.64 KG/M2

## 2018-03-19 DIAGNOSIS — S22.31XA CLOSED FRACTURE OF ONE RIB OF RIGHT SIDE, INITIAL ENCOUNTER: ICD-10-CM

## 2018-03-19 DIAGNOSIS — M80.00XA AGE-RELATED OSTEOPOROSIS WITH CURRENT PATHOLOGICAL FRACTURE, INITIAL ENCOUNTER: Primary | ICD-10-CM

## 2018-03-19 DIAGNOSIS — C21.1 MALIGNANT NEOPLASM OF ANAL CANAL (H): ICD-10-CM

## 2018-03-19 DIAGNOSIS — R14.1 FLATULENCE, ERUCTATION AND GAS PAIN: ICD-10-CM

## 2018-03-19 DIAGNOSIS — R14.2 FLATULENCE, ERUCTATION AND GAS PAIN: ICD-10-CM

## 2018-03-19 DIAGNOSIS — R14.3 FLATULENCE, ERUCTATION AND GAS PAIN: ICD-10-CM

## 2018-03-19 DIAGNOSIS — R68.2 MOUTH DRYNESS: ICD-10-CM

## 2018-03-19 DIAGNOSIS — C21.1 MALIGNANT NEOPLASM OF ANAL CANAL (H): Primary | ICD-10-CM

## 2018-03-19 DIAGNOSIS — E86.0 DEHYDRATION: ICD-10-CM
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
## 2018-03-19 DIAGNOSIS — R19.7 DIARRHEA, UNSPECIFIED TYPE: ICD-10-CM

## 2018-03-19 LAB
ALBUMIN SERPL-MCNC: 1.8 G/DL (ref 3.4–5)
ALBUMIN UR-MCNC: 30 MG/DL
ALP SERPL-CCNC: 62 U/L (ref 40–150)
ALT SERPL W P-5'-P-CCNC: 28 U/L (ref 0–50)
ANION GAP SERPL CALCULATED.3IONS-SCNC: 12 MMOL/L (ref 3–14)
APPEARANCE UR: ABNORMAL
APTT PPP: 37 SEC (ref 22–37)
AST SERPL W P-5'-P-CCNC: 25 U/L (ref 0–45)
BACTERIA #/AREA URNS HPF: ABNORMAL /HPF
BASOPHILS # BLD AUTO: 0 10E9/L (ref 0–0.2)
BASOPHILS NFR BLD AUTO: 0 %
BILIRUB SERPL-MCNC: 0.9 MG/DL (ref 0.2–1.3)
BILIRUB UR QL STRIP: NEGATIVE
BUN SERPL-MCNC: 26 MG/DL (ref 7–30)
C COLI+JEJUNI+LARI FUSA STL QL NAA+PROBE: NOT DETECTED
C DIFF TOX B STL QL: NEGATIVE
CALCIUM SERPL-MCNC: 8.3 MG/DL (ref 8.5–10.1)
CHLORIDE SERPL-SCNC: 101 MMOL/L (ref 94–109)
CO2 SERPL-SCNC: 20 MMOL/L (ref 20–32)
COLOR UR AUTO: YELLOW
CREAT SERPL-MCNC: 0.86 MG/DL (ref 0.52–1.04)
CREAT SERPL-MCNC: 1.02 MG/DL (ref 0.52–1.04)
DIFFERENTIAL METHOD BLD: ABNORMAL
EC STX1 GENE STL QL NAA+PROBE: NOT DETECTED
EC STX2 GENE STL QL NAA+PROBE: NOT DETECTED
ENTERIC PATHOGEN COMMENT: NORMAL
EOSINOPHIL # BLD AUTO: 0 10E9/L (ref 0–0.7)
EOSINOPHIL NFR BLD AUTO: 0 %
ERYTHROCYTE [DISTWIDTH] IN BLOOD BY AUTOMATED COUNT: 14.5 % (ref 10–15)
GFR SERPL CREATININE-BSD FRML MDRD: 52 ML/MIN/1.7M2
GFR SERPL CREATININE-BSD FRML MDRD: 63 ML/MIN/1.7M2
GLUCOSE SERPL-MCNC: 77 MG/DL (ref 70–99)
GLUCOSE UR STRIP-MCNC: NEGATIVE MG/DL
HCT VFR BLD AUTO: 33.4 % (ref 35–47)
HGB BLD-MCNC: 11.2 G/DL (ref 11.7–15.7)
HGB UR QL STRIP: ABNORMAL
IMM GRANULOCYTES # BLD: 0 10E9/L (ref 0–0.4)
IMM GRANULOCYTES NFR BLD: 1.9 %
INR PPP: 2.04 (ref 0.86–1.14)
KETONES UR STRIP-MCNC: 40 MG/DL
LACTATE BLD-SCNC: 0.6 MMOL/L (ref 0.7–2)
LEUKOCYTE ESTERASE UR QL STRIP: ABNORMAL
LIPASE SERPL-CCNC: 46 U/L (ref 73–393)
LYMPHOCYTES # BLD AUTO: 0.1 10E9/L (ref 0.8–5.3)
LYMPHOCYTES NFR BLD AUTO: 3.7 %
MCH RBC QN AUTO: 30.6 PG (ref 26.5–33)
MCHC RBC AUTO-ENTMCNC: 33.5 G/DL (ref 31.5–36.5)
MCV RBC AUTO: 91 FL (ref 78–100)
MONOCYTES # BLD AUTO: 0.1 10E9/L (ref 0–1.3)
MONOCYTES NFR BLD AUTO: 5.6 %
MUCOUS THREADS #/AREA URNS LPF: PRESENT /LPF
NEUTROPHILS # BLD AUTO: 1.9 10E9/L (ref 1.6–8.3)
NEUTROPHILS NFR BLD AUTO: 88.8 %
NITRATE UR QL: NEGATIVE
NOROV GI+II ORF1-ORF2 JNC STL QL NAA+PR: NOT DETECTED
NRBC # BLD AUTO: 0 10*3/UL
NRBC BLD AUTO-RTO: 0 /100
PH UR STRIP: 5.5 PH (ref 5–7)
PLATELET # BLD AUTO: 145 10E9/L (ref 150–450)
PLATELET # BLD AUTO: 148 10E9/L (ref 150–450)
POTASSIUM SERPL-SCNC: 3.6 MMOL/L (ref 3.4–5.3)
PROT SERPL-MCNC: 4.4 G/DL (ref 6.8–8.8)
RBC # BLD AUTO: 3.66 10E12/L (ref 3.8–5.2)
RBC #/AREA URNS AUTO: 3 /HPF (ref 0–2)
RVA NSP5 STL QL NAA+PROBE: NOT DETECTED
SALMONELLA SP RPOD STL QL NAA+PROBE: NOT DETECTED
SHIGELLA SP+EIEC IPAH STL QL NAA+PROBE: NOT DETECTED
SODIUM SERPL-SCNC: 133 MMOL/L (ref 133–144)
SOURCE: ABNORMAL
SP GR UR STRIP: 1.02 (ref 1–1.03)
SPECIMEN SOURCE: NORMAL
SQUAMOUS #/AREA URNS AUTO: <1 /HPF (ref 0–1)
TRANS CELLS #/AREA URNS HPF: <1 /HPF (ref 0–1)
TROPONIN I SERPL-MCNC: 0.03 UG/L (ref 0–0.04)
UROBILINOGEN UR STRIP-MCNC: NORMAL MG/DL (ref 0–2)
V CHOL+PARA RFBL+TRKH+TNAA STL QL NAA+PR: NOT DETECTED
WBC # BLD AUTO: 2.1 10E9/L (ref 4–11)
WBC #/AREA URNS AUTO: 26 /HPF (ref 0–5)
WBC CLUMPS #/AREA URNS HPF: PRESENT /HPF
Y ENTERO RECN STL QL NAA+PROBE: NOT DETECTED
YEAST #/AREA URNS HPF: ABNORMAL /HPF

## 2018-03-19 PROCEDURE — 80053 COMPREHEN METABOLIC PANEL: CPT | Performed by: FAMILY MEDICINE

## 2018-03-19 PROCEDURE — 83605 ASSAY OF LACTIC ACID: CPT | Performed by: FAMILY MEDICINE

## 2018-03-19 PROCEDURE — 87493 C DIFF AMPLIFIED PROBE: CPT | Performed by: INTERNAL MEDICINE

## 2018-03-19 PROCEDURE — 87088 URINE BACTERIA CULTURE: CPT | Performed by: INTERNAL MEDICINE

## 2018-03-19 PROCEDURE — 25000128 H RX IP 250 OP 636: Performed by: FAMILY MEDICINE

## 2018-03-19 PROCEDURE — 84484 ASSAY OF TROPONIN QUANT: CPT | Performed by: FAMILY MEDICINE

## 2018-03-19 PROCEDURE — 87186 SC STD MICRODIL/AGAR DIL: CPT | Performed by: INTERNAL MEDICINE

## 2018-03-19 PROCEDURE — 99285 EMERGENCY DEPT VISIT HI MDM: CPT | Mod: 25

## 2018-03-19 PROCEDURE — A9270 NON-COVERED ITEM OR SERVICE: HCPCS | Mod: GY | Performed by: INTERNAL MEDICINE

## 2018-03-19 PROCEDURE — 85025 COMPLETE CBC W/AUTO DIFF WBC: CPT | Performed by: FAMILY MEDICINE

## 2018-03-19 PROCEDURE — 99285 EMERGENCY DEPT VISIT HI MDM: CPT | Mod: 25 | Performed by: FAMILY MEDICINE

## 2018-03-19 PROCEDURE — 81001 URINALYSIS AUTO W/SCOPE: CPT | Performed by: INTERNAL MEDICINE

## 2018-03-19 PROCEDURE — 85610 PROTHROMBIN TIME: CPT | Performed by: FAMILY MEDICINE

## 2018-03-19 PROCEDURE — 25000132 ZZH RX MED GY IP 250 OP 250 PS 637: Mod: GY | Performed by: INTERNAL MEDICINE

## 2018-03-19 PROCEDURE — 77386 ZZH IMRT TREATMENT DELIVERY, COMPLEX: CPT | Performed by: RADIOLOGY

## 2018-03-19 PROCEDURE — 36592 COLLECT BLOOD FROM PICC: CPT | Performed by: INTERNAL MEDICINE

## 2018-03-19 PROCEDURE — 85730 THROMBOPLASTIN TIME PARTIAL: CPT | Performed by: FAMILY MEDICINE

## 2018-03-19 PROCEDURE — 87086 URINE CULTURE/COLONY COUNT: CPT | Performed by: INTERNAL MEDICINE

## 2018-03-19 PROCEDURE — 85049 AUTOMATED PLATELET COUNT: CPT | Performed by: INTERNAL MEDICINE

## 2018-03-19 PROCEDURE — 87506 IADNA-DNA/RNA PROBE TQ 6-11: CPT | Performed by: INTERNAL MEDICINE

## 2018-03-19 PROCEDURE — 99221 1ST HOSP IP/OBS SF/LOW 40: CPT | Mod: AI | Performed by: INTERNAL MEDICINE

## 2018-03-19 PROCEDURE — 25000125 ZZHC RX 250: Performed by: INTERNAL MEDICINE

## 2018-03-19 PROCEDURE — 25000128 H RX IP 250 OP 636: Performed by: INTERNAL MEDICINE

## 2018-03-19 PROCEDURE — 71046 X-RAY EXAM CHEST 2 VIEWS: CPT

## 2018-03-19 PROCEDURE — 82565 ASSAY OF CREATININE: CPT | Performed by: INTERNAL MEDICINE

## 2018-03-19 PROCEDURE — 93010 ELECTROCARDIOGRAM REPORT: CPT | Mod: Z6 | Performed by: FAMILY MEDICINE

## 2018-03-19 PROCEDURE — 83690 ASSAY OF LIPASE: CPT | Performed by: FAMILY MEDICINE

## 2018-03-19 PROCEDURE — 93005 ELECTROCARDIOGRAM TRACING: CPT

## 2018-03-19 PROCEDURE — 12000008 ZZH R&B INTERMEDIATE UMMC

## 2018-03-19 RX ORDER — SODIUM CHLORIDE 9 MG/ML
INJECTION, SOLUTION INTRAVENOUS CONTINUOUS
Status: DISCONTINUED | OUTPATIENT
Start: 2018-03-19 | End: 2018-03-20

## 2018-03-19 RX ORDER — LOPERAMIDE HCL 2 MG
2 CAPSULE ORAL 4 TIMES DAILY PRN
Status: DISCONTINUED | OUTPATIENT
Start: 2018-03-19 | End: 2018-03-28

## 2018-03-19 RX ORDER — PHYTONADIONE 5 MG/1
5 TABLET ORAL DAILY
Status: DISCONTINUED | OUTPATIENT
Start: 2018-03-19 | End: 2018-03-20

## 2018-03-19 RX ORDER — ONDANSETRON 2 MG/ML
4 INJECTION INTRAMUSCULAR; INTRAVENOUS EVERY 6 HOURS PRN
Status: DISCONTINUED | OUTPATIENT
Start: 2018-03-19 | End: 2018-03-20

## 2018-03-19 RX ORDER — DIPHENHYDRAMINE HYDROCHLORIDE AND LIDOCAINE HYDROCHLORIDE AND ALUMINUM HYDROXIDE AND MAGNESIUM HYDRO
5-10 KIT 4 TIMES DAILY PRN
Status: DISCONTINUED | OUTPATIENT
Start: 2018-03-19 | End: 2018-04-04 | Stop reason: HOSPADM

## 2018-03-19 RX ORDER — ACETAMINOPHEN 325 MG/1
650 TABLET ORAL EVERY 4 HOURS PRN
Status: DISCONTINUED | OUTPATIENT
Start: 2018-03-19 | End: 2018-04-04 | Stop reason: HOSPADM

## 2018-03-19 RX ORDER — LIDOCAINE 40 MG/G
CREAM TOPICAL
Status: DISCONTINUED | OUTPATIENT
Start: 2018-03-19 | End: 2018-03-25

## 2018-03-19 RX ORDER — LIDOCAINE/PRILOCAINE 2.5 %-2.5%
CREAM (GRAM) TOPICAL
Status: DISCONTINUED | OUTPATIENT
Start: 2018-03-19 | End: 2018-04-04 | Stop reason: HOSPADM

## 2018-03-19 RX ORDER — SODIUM CHLORIDE 9 MG/ML
1000 INJECTION, SOLUTION INTRAVENOUS CONTINUOUS
Status: DISCONTINUED | OUTPATIENT
Start: 2018-03-19 | End: 2018-03-20

## 2018-03-19 RX ADMIN — B-COMPLEX W/ C & FOLIC ACID TAB 1 TABLET: TAB at 18:18

## 2018-03-19 RX ADMIN — ENOXAPARIN SODIUM 40 MG: 40 INJECTION SUBCUTANEOUS at 20:35

## 2018-03-19 RX ADMIN — PHYTONADIONE 5 MG: 5 TABLET ORAL at 21:25

## 2018-03-19 RX ADMIN — ACETAMINOPHEN 650 MG: 325 TABLET, FILM COATED ORAL at 21:25

## 2018-03-19 RX ADMIN — SODIUM CHLORIDE: 9 INJECTION, SOLUTION INTRAVENOUS at 17:55

## 2018-03-19 RX ADMIN — Medication 1 TABLET: at 18:19

## 2018-03-19 RX ADMIN — LIDOCAINE AND PRILOCAINE: 25; 25 CREAM TOPICAL at 20:13

## 2018-03-19 RX ADMIN — SODIUM CHLORIDE 1000 ML: 9 INJECTION, SOLUTION INTRAVENOUS at 14:18

## 2018-03-19 RX ADMIN — HYDROCORTISONE: 25 CREAM TOPICAL at 20:13

## 2018-03-19 ASSESSMENT — ENCOUNTER SYMPTOMS
VOMITING: 1
DIARRHEA: 1
COLOR CHANGE: 0
NECK PAIN: 0
HEADACHES: 0
CHEST TIGHTNESS: 0
ARTHRALGIAS: 1
NECK STIFFNESS: 0
NAUSEA: 1
FACIAL SWELLING: 0
FEVER: 0
LIGHT-HEADEDNESS: 1
DIFFICULTY URINATING: 0
DYSURIA: 0
SHORTNESS OF BREATH: 0
BRUISES/BLEEDS EASILY: 0
DYSPHORIC MOOD: 1
WEAKNESS: 1
EYE REDNESS: 0
MYALGIAS: 1
CONFUSION: 0
FATIGUE: 1
WOUND: 0
ACTIVITY CHANGE: 1
DECREASED CONCENTRATION: 1
COUGH: 0
ABDOMINAL PAIN: 0

## 2018-03-19 NOTE — PHARMACY-ADMISSION MEDICATION HISTORY
Admission medication history interview status for the 3/19/2018 admission is complete. See Epic admission navigator for allergy information, pharmacy, prior to admission medications and immunization status.     Medication history interview sources:  Patient- had a bag of medications with her    Changes made to PTA medication list (reason)  Added: None  Deleted: None  Changed: added dose of acetaminophen    Additional medication history information (including reliability of information, actions taken by pharmacist):  - Patient seemed intermittently confused, but made perfect sense in other moments, may have been delayed in thinking, she could not remember if she took her meds this morning, unclear when last doses were  - Patient had her med bottles with her, the meds that could not be confirmed were opium tincture and pramipexole, she reports she takes magic mouthwash but did not bring the bottle  - She had a bottle for 4 tabs of potassium 20mEq filled 3/9/18, she reports this is completed  - did not know if she had a flu shot, nothing in [facility]    Prior to Admission medications    Medication Sig Last Dose Taking? Auth Provider   ACETAMINOPHEN PO Take 650 mg by mouth every 6 hours as needed for pain  Yes Unknown, Entered By History   loperamide (LOPERAMIDE A-D) 2 MG tablet Take 1 tablet (2 mg) by mouth 4 times daily as needed for diarrhea  Patient not taking: Reported on 3/17/2018   Zaire Madison MD   opium tincture dilute, 1 mg/mL morphine equivalent, Take 5 mg by mouth every 6 hours as needed for diarrhea.   Ziare Madison MD   lidocaine-prilocaine (EMLA) cream Apply topically as needed for moderate pain  Patient not taking: Reported on 3/9/2018   Shen Ray MD   magic mouthwash (ENTER INGREDIENTS IN COMMENTS) suspension Swish and spit 5-10 mL four times daily as needed   Shen Ray MD   LORazepam (ATIVAN) 0.5 MG tablet Take 1 tablet (0.5 mg) by mouth every 4 hours as needed (Anxiety,  Nausea/Vomiting or Sleep)  Patient not taking: Reported on 3/2/2018   Shen Ray MD   prochlorperazine (COMPAZINE) 10 MG tablet Take 1 tablet (10 mg) by mouth every 6 hours as needed (Nausea/Vomiting)  Patient not taking: Reported on 3/9/2018   Shen Ray MD   vitamin B complex with vitamin C (VITAMIN  B COMPLEX) TABS tablet Take 1 tablet by mouth daily   Reported, Patient   Calcium Carbonate-Vitamin D (CALCIUM 500/VITAMIN D PO)    Reported, Patient   hydrocortisone (ANUSOL-HC) 2.5 % cream Place rectally 2 times daily   Felisha Davis, NP   pramipexole (MIRAPEX) 0.125 MG tablet Take one tablet 2-3 hours before bedtime, may increase to 2 tablets after one week if needed  Patient not taking: Reported on 3/17/2018   Felisha Davis NP       Medication history completed by: Margaret Lopez, PharmD Resident

## 2018-03-19 NOTE — MR AVS SNAPSHOT
After Visit Summary   3/19/2018    Elizabeth Taylor    MRN: [id number]           Patient Information     Date Of Birth          1940        Visit Information        Provider Department      3/19/2018 10:00 AM Zaire Madison MD [facility]        Today's Diagnoses     Malignant neoplasm of anal canal (H)    -  1       Follow-ups after your visit        Your next 10 appointments already scheduled     Mar 20, 2018  9:00 AM CDT   EXTERNAL RADIATION TREATMENT with [facility]   Radiation Oncology Clinic ([facility])    [facility]  1st Floor  [address]  [facility] [address]   [phone]            Mar 21, 2018  9:00 AM CDT   EXTERNAL RADIATION TREATMENT with [facility]   Radiation Oncology Clinic ([facility])    [facility]  1st Floor  [address]  [facility] [address]   [phone]            Mar 22, 2018  9:00 AM CDT   EXTERNAL RADIATION TREATMENT with [facility]   Radiation Oncology Clinic ([facility])    [facility]  1st Floor  [address]  [facility] [address]   [phone]            Mar 23, 2018  9:00 AM CDT   EXTERNAL RADIATION TREATMENT with [facility] ON[facility]YA   Radiation Oncology Clinic ([facility])    [facility]  1st Floor  [address]  [facility] [address]   [phone]            Mar 23, 2018  9:45 AM CDT   [facility] Draw with  [facility] DRAW   [facility]    [address]  [address]  [facility] [address]   [phone]            Mar 23, 2018 10:20 AM CDT   (Arrive by 10:05 AM)   Return Visit with GRAHAM Calvillo CNP   [facility]    [address]  [address]  [facility] [address] [phone]            Apr 06, 2018 10:00  AM CDT   [facility] Lab Draw with  [facility] LAB DRAW   [facility] Lab Draw ([facility])    [address]  [address]  [facility] [place] [id number] [phone]            Apr 06, 2018 10:30 AM CDT   (Arrive by 10:15 AM)   [facility] Shen Ray MD   [facility]    [address]  [address]  [facility] [place] [id number] [phone]              Who to contact     Please call your clinic at [phone] to:    Ask questions about your health    Make or cancel appointments    Discuss your medicines    Learn about your test results    Speak to your doctor            Additional Information About Your Visit        L[employer]ur[employer] Information     L[employer] gives you secure access to your electronic health record. If you see a primary care provider, you can also send messages to your care team and make appointments. If you have questions, please call your primary care clinic.  If you do not have a primary care provider, please call [phone] and they will assist you.      L[employer]ur[employer] is an electronic gateway that provides easy, online access to your medical records. With [employer], you can request a clinic appointment, read your test results, renew a prescription or communicate with your care team.     To access your existing account, please contact your [facility] or call [phone] for assistance.        Care EveryWhere ID     This is your Care EveryWhere ID. This could be used by other organizations to access your [facility] medical records  [id number]        Your Vitals Were     Pulse BMI (Body Mass Index)                82 22.64 kg/m2           Blood Pressure from Last 3 Encounters:   03/19/18 111/77   03/17/18 97/64   03/17/18 96/61    Weight from Last 3 Encounters:   03/19/18 59.3 kg (130 lb 12.8 oz)   03/17/18 55.2 kg (121 lb 11.1 oz)   03/17/18 55.2 kg (121 lb 9.6 oz)              Today, you  had the following     No orders found for display       Primary Care Provider Office Phone # Fax #    Felisha Davis, [phone] [address] [facility] [address]        Equal Access to Services     IONA DALEY : Hadii aad ku hadrayo Soomaali, waaxda luqadaha, qaybta kaalmada adeegyada, cyndy toreyin hayaan tio addie isabelle . So [facility] [phone].    ATENCIÓN: Si habla español, tiene a ghosh disposición servicios gratuitos de asistencia lingüística. [facility] [phone].    We comply with applicable federal civil rights laws and [place] laws. We do not discriminate on the basis of race, color, national origin, age, disability, sex, sexual orientation, or gender identity.            Thank you!     Thank you for choosing [facility]  for your care. Our goal is always to provide you with excellent care. Hearing back from our patients is one way we can continue to improve our services. Please take a few minutes to complete the written survey that you may receive in the mail after your visit with us. Thank you!             Your Updated Medication List - Protect others around you: Learn how to safely use, store and throw away your medicines at www.disposemymeds.org.          This list is accurate as of 3/19/18 12:10 PM.  Always use your most recent med list.                   Brand Name Dispense Instructions for use Diagnosis    CALCIUM 500/VITAMIN D PO           hydrocortisone 2.5 % cream    ANUSOL-HC    30 g    Place rectally 2 times daily    External hemorrhoids       lidocaine-prilocaine cream    EMLA    5 g    Apply topically as needed for moderate pain    Malignant neoplasm of anal canal (H)       loperamide 2 MG tablet    LOPERAMIDE A-D    60 tablet    Take 1 tablet (2 mg) by mouth 4 times daily as needed for diarrhea    Malignant neoplasm of anal canal (H)       LORazepam 0.5 MG tablet    ATIVAN    30 tablet    Take 1 tablet (0.5 mg) by mouth every 4 hours as  needed (Anxiety, Nausea/Vomiting or Sleep)    Malignant neoplasm of anal canal (H)       magic mouthwash suspension    ENTER INGREDIENTS IN COMMENTS    500 mL    Swish and spit 5-10 mL four times daily as needed    Malignant neoplasm of anal canal (H)       opium tincture dilute (1 mg/mL morphine equivalent)     200 mL    Take 5 mg by mouth every 6 hours as needed for diarrhea.    Malignant neoplasm of anal canal (H)       pramipexole 0.125 MG tablet    MIRAPEX    60 tablet    Take one tablet 2-3 hours before bedtime, may increase to 2 tablets after one week if needed    Restless leg syndrome       prochlorperazine 10 MG tablet    COMPAZINE    30 tablet    Take 1 tablet (10 mg) by mouth every 6 hours as needed (Nausea/Vomiting)    Malignant neoplasm of anal canal (H)       TYLENOL EXTRA STRENGTH PO      1 TABLET EVERY 4 HOURS AS NEEDED        vitamin B complex with vitamin C Tabs tablet      Take 1 tablet by mouth daily

## 2018-03-19 NOTE — ED PROVIDER NOTES
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 Per review of her chart, she was just seen in the ED 2 days ago for generalized weakness and dizziness, secondary to dehydration from ongoing diarrhea (on opium). She was treated with 2 L of IV fluid and eating soup broth. Patient states when she got home after discharge that evening, she was able to eat some jello. However, 30 minutes afterward she started vomiting and also had diarrhea. She has only been drinking fluids since then and still had a couple more episodes of diarrhea. She is unable to recall anything that happened Sunday. This morning she fell at 8 AM after losing her balance and landed on her back and now complains of right lower rib pain. She denies any loss of consciousness with this fall. She currently lives by herself.  As noted patient denies any shortness of breath cough or hemoptysis no other injuries noted.  Patient is agreement now coming in the hospital may be appropriate.  Friend brought here also concerned that she is gone through all of her tincture of opium elixir.  No reports of blood in stool etc.    PAST MEDICAL HISTORY  Past Medical History:   Diagnosis Date     Anal cancer (H)      Endometriosis, site unspecified      Thyroiditis, unspecified     Thryoiditis-resolved     PAST SURGICAL HISTORY  Past Surgical History:   Procedure Laterality Date     APPENDECTOMY      as a child     COLONOSCOPY N/A 4/13/2017    Procedure: COLONOSCOPY;  Surgeon: Juan Dos Santos MD;  Location: [facility]     INSERT PORT VASCULAR ACCESS Right 2/8/2018    Procedure: INSERT PORT VASCULAR ACCESS;  Place Single Lumen Venous Chest Port Placement;  Surgeon: Zaire Moreira PA-C;  Location: [facility]     SURGICAL HISTORY OF -       endometriosis     FAMILY  HISTORY  Family History   Problem Relation Age of Onset     CANCER Sister      CANCER Maternal Grandmother      lymphoma     HEART DISEASE Father      MI     Uterine Cancer Niece      SOCIAL HISTORY  Social History   Substance Use Topics     Smoking status: Never Smoker     Smokeless tobacco: Never Used     Alcohol use No     MEDICATIONS  Current Facility-Administered Medications   Medication     lidocaine 1 % 1 mL     lidocaine (LMX4) kit     sodium chloride (PF) 0.9% PF flush 3 mL     sodium chloride (PF) 0.9% PF flush 3 mL     sodium chloride 0.9% infusion     calcium-vitamin D (CALTRATE) 600-400 MG-UNIT per tablet 1 tablet     hydrocortisone (ANUSOL-HC) 2.5 % cream     loperamide (IMODIUM) capsule 2 mg     magic mouthwash suspension (diphenhydramine, lidocaine, aluminum-magnesium & simethicone)     vitamin B complex with vitamin C (STRESS TAB) tablet 1 tablet     Medication Instruction     enoxaparin (LOVENOX) injection 40 mg     sodium chloride 0.9% infusion     ondansetron (ZOFRAN) injection 4 mg     acetaminophen (TYLENOL) tablet 650 mg     lidocaine-prilocaine (EMLA) cream     ALLERGIES  Allergies   Allergen Reactions     Darvon [Propoxyphene]      Had reaction in teen years     Penicillins      As a child     Ketoconazole Rash       I have reviewed the Medications, Allergies, Past Medical and Surgical History, and Social History in the Epic system.    Review of Systems   Constitutional: Positive for activity change and fatigue. Negative for fever.        Fall today noted.  Some right posterior rib pain but patient otherwise pleasant stable similar to 2 days ago but then and frail with some mild cachexia noted but nontoxic without encephalopathy   HENT: Negative for congestion and facial swelling.    Eyes: Negative for redness and visual disturbance.   Respiratory: Negative for cough, chest tightness and shortness of breath.    Cardiovascular: Positive for leg swelling. Negative for chest pain.         "Bilateral leg swelling similar pain somewhat improved per patient she is elevating it   Gastrointestinal: Positive for diarrhea, nausea and vomiting. Negative for abdominal pain.   Genitourinary: Negative for difficulty urinating, dysuria and urgency.   Musculoskeletal: Positive for arthralgias and myalgias. Negative for neck pain and neck stiffness.        Positive for right lower rib pain.    Skin: Negative for color change, rash and wound.   Allergic/Immunologic: Positive for immunocompromised state.   Neurological: Positive for weakness and light-headedness. Negative for syncope and headaches.        Generalized weakness without focal findings of generalized lightheadedness denies syncope or seizure   Hematological: Does not bruise/bleed easily.   Psychiatric/Behavioral: Positive for decreased concentration and dysphoric mood. Negative for confusion.   All other systems reviewed and are negative.      Physical Exam   BP: 121/57  Pulse: 80  Heart Rate: 86  Temp: 95.4  F (35.2  C)  Resp: 18  Height: 165.1 cm (5' 5\")  SpO2: 96 %      Physical Exam   Constitutional: She is oriented to person, place, and time. She appears well-developed and well-nourished. She appears distressed.   HENT:   Head: Normocephalic and atraumatic.   No injury seen at this point pupils equal no scleral icterus negative vital signs no otorhinorrhea  Dry mucous membranes noted on exam   Eyes: EOM are normal. Pupils are equal, round, and reactive to light. No scleral icterus.   Neck: Normal range of motion. Neck supple. No JVD present.   Cardiovascular: Regular rhythm.    Pulmonary/Chest: No stridor. No respiratory distress. She exhibits tenderness.   Patient appears in no respiratory distress breath sounds are equal.  Some right posterior lower rib tenderness without crepitus or step-off or bruising seen.   Musculoskeletal: She exhibits edema.   Lower extremities 2+ edema bilateral without significant tender   Neurological: She is alert and " oriented to person, place, and time. She has normal reflexes. No cranial nerve deficit. Coordination normal.   Skin: Skin is warm and dry. No rash noted. She is not diaphoretic. No erythema. No pallor.   Psychiatric: She has a normal mood and affect. Her behavior is normal. Judgment and thought content normal.   Flat but appropriate   Nursing note and vitals reviewed.      ED Course     ED Course     Procedures   12:59 PM  The patient was seen and examined by Dr. Fontanez  in [facility].   Patient here with friend.  Patient give history.  Does not recollect what she did Sunday.  Patient's port was accessed.  IV fluids given in the ER.  Labs are drawn.  Lactic acid 0.6 troponin is 0.025.  Lipase is 46 creatinine 1.02 BUN is 26.  Glucose 77 sodium 133 potassium 3.6.  INR 2.04.  White count 2.1 hemoglobin 11.2 platelets 148    Chest x-ray done revealing isolated right posterior rib fracture without pneumothorax.  Some bilateral pleural effusions noted from previous.    EKG shows sinus arrhythmia without ischemic changes.    Patient stable seen by oncology fellow here in the ER will plan to admit the patient to oncology service for ongoing dehydration with now fall with isolated right posterior rib fracture #8.  Discussed with trauma also at this point he felt he did not need to be consulted as  Isolated injury without any other complications.  Patient admitted for other reasons to oncology for dehydration and fall etc.             EKG Interpretation:      Interpreted by Giorgio Fontanez  Time reviewed: 1334  Symptoms at time of EKG: fall   Rhythm: normal sinus   Rate: normal  Axis: normal  Ectopy: none  Conduction: normal  ST Segments/ T Waves: No hyperacute ST-T wave changes  Q Waves: none  Comparison to prior: Unchanged    Clinical Impression: normal EKG          Critical Care time:  none             Labs Ordered and Resulted from Time of ED Arrival Up to the Time of Departure from the ED   CBC WITH PLATELETS  DIFFERENTIAL - Abnormal; Notable for the following:        Result Value    WBC 2.1 (*)     RBC Count 3.66 (*)     Hemoglobin 11.2 (*)     Hematocrit 33.4 (*)     Platelet Count 148 (*)     Absolute Lymphocytes 0.1 (*)     All other components within normal limits   INR - Abnormal; Notable for the following:     INR 2.04 (*)     All other components within normal limits   COMPREHENSIVE METABOLIC PANEL - Abnormal; Notable for the following:     GFR Estimate 52 (*)     Calcium 8.3 (*)     Albumin 1.8 (*)     Protein Total 4.4 (*)     All other components within normal limits   LIPASE - Abnormal; Notable for the following:     Lipase 46 (*)     All other components within normal limits   LACTIC ACID WHOLE BLOOD - Abnormal; Notable for the following:     Lactic Acid 0.6 (*)     All other components within normal limits   PARTIAL THROMBOPLASTIN TIME   TROPONIN I   ROUTINE UA WITH MICROSCOPIC REFLEX TO CULTURE   CARDIAC CONTINUOUS MONITORING   PULSE OXIMETRY NURSING   PERIPHERAL IV CATHETER   ORTHOSTATIC BLOOD PRESSURE AND PULSE   ENTERIC BACTERIA AND VIRUS PANEL BY JACQUELIN STOOL   CLOSTRIDIUM DIFFICILE TOXIN B     Results for orders placed or performed during the hospital encounter of 03/19/18   XR Chest 2 Views    Narrative    Study: XR CHEST 2 VW 3/19/2018 2:06 PM    Comparison: Radiograph from 3/17/2018, CT images from 3/7/2018.    History: fall with right post rib and chest injury    Findings:   PA and lateral radiographs of the chest are obtained and reviewed.  Right IJ Port-A-Cath with tip in the high right atrium. Right apical  pleural thickening. Bilateral small pleural effusions, right greater  than left. Cardiac and mediastinal silhouettes are within normal  limits. Upper abdomen is unremarkable. Bibasilar hazy opacities.  Pulmonary vasculature is mildly indistinct. Age indeterminate  compression deformities of the thoracic spine. Chronic appearing  fracture deformity of left proximal humerus. Fracture deformity of  the  eighth right rib. Curvature of the spine to the right. Upper abdomen  is unremarkable.      Impression    Impression:   1. Fracture deformity of the eighth right rib, new since 3/17/2018.  2. Bilateral pleural effusions, right greater than left, and right  basilar opacities, may represent consolidation, effusion, atelectasis.  2. Age-indeterminate compression deformities of the thoracic spine.  3. Mild pulmonary vasculature congestion.    I have personally reviewed the examination and initial interpretation  and I agree with the findings.    BOSTON ASHFORD MD   CBC with platelets differential   Result Value Ref Range    WBC 2.1 (L) 4.0 - 11.0 10e9/L    RBC Count 3.66 (L) 3.8 - 5.2 10e12/L    Hemoglobin 11.2 (L) 11.7 - 15.7 g/dL    Hematocrit 33.4 (L) 35.0 - 47.0 %    MCV 91 78 - 100 fl    MCH 30.6 26.5 - 33.0 pg    MCHC 33.5 31.5 - 36.5 g/dL    RDW 14.5 10.0 - 15.0 %    Platelet Count 148 (L) 150 - 450 10e9/L    Diff Method Automated Method     % Neutrophils 88.8 %    % Lymphocytes 3.7 %    % Monocytes 5.6 %    % Eosinophils 0.0 %    % Basophils 0.0 %    % Immature Granulocytes 1.9 %    Nucleated RBCs 0 0 /100    Absolute Neutrophil 1.9 1.6 - 8.3 10e9/L    Absolute Lymphocytes 0.1 (L) 0.8 - 5.3 10e9/L    Absolute Monocytes 0.1 0.0 - 1.3 10e9/L    Absolute Eosinophils 0.0 0.0 - 0.7 10e9/L    Absolute Basophils 0.0 0.0 - 0.2 10e9/L    Abs Immature Granulocytes 0.0 0 - 0.4 10e9/L    Absolute Nucleated RBC 0.0    INR   Result Value Ref Range    INR 2.04 (H) 0.86 - 1.14   Partial thromboplastin time   Result Value Ref Range    PTT 37 22 - 37 sec   Comprehensive metabolic panel   Result Value Ref Range    Sodium 133 133 - 144 mmol/L    Potassium 3.6 3.4 - 5.3 mmol/L    Chloride 101 94 - 109 mmol/L    Carbon Dioxide 20 20 - 32 mmol/L    Anion Gap 12 3 - 14 mmol/L    Glucose 77 70 - 99 mg/dL    Urea Nitrogen 26 7 - 30 mg/dL    Creatinine 1.02 0.52 - 1.04 mg/dL    GFR Estimate 52 (L) >60 mL/min/1.7m2    GFR Estimate If  Black 63 >60 mL/min/1.7m2    Calcium 8.3 (L) 8.5 - 10.1 mg/dL    Bilirubin Total 0.9 0.2 - 1.3 mg/dL    Albumin 1.8 (L) 3.4 - 5.0 g/dL    Protein Total 4.4 (L) 6.8 - 8.8 g/dL    Alkaline Phosphatase 62 40 - 150 U/L    ALT 28 0 - 50 U/L    AST 25 0 - 45 U/L   Lipase   Result Value Ref Range    Lipase 46 (L) 73 - 393 U/L   Lactic acid whole blood   Result Value Ref Range    Lactic Acid 0.6 (L) 0.7 - 2.0 mmol/L   Troponin I   Result Value Ref Range    Troponin I ES 0.025 0.000 - 0.045 ug/L   Platelet count   Result Value Ref Range    Platelet Count 145 (L) 150 - 450 10e9/L   EKG 12-lead, tracing only   Result Value Ref Range    Interpretation ECG Click View Image link to view waveform and result          Consults  Trauma: Responded (03/19/18 1654)    Assessments & Plan (with Medical Decision Making)  77-year-old female history of anal cancer was seen here 2 days ago by myself for orthostatic symptoms she was rehydrated she has ongoing diarrhea that may be result of her recent fluorouracil treatment for chemotherapy.  Patient has no sign of bleeding she wanted to go home Saturday which she did she proceed to have nausea vomiting is a decreased oral intake today she fell injuring her right posterior rib area which notes an isolated #8 rib fracture without complications on x-ray today.  Patient neurologically otherwise intact.  She was given IV fluids in the ER other labs stable will be admitted to oncology service for ongoing hydration with fall and further etiology of her diarrhea weakness etc.  Patient agrees with plan.  As noted discussed with trauma also they felt at this point no consult needed.         I have reviewed the nursing notes.    I have reviewed the findings, diagnosis, plan and need for follow up with the patient.    Current Discharge Medication List          Final diagnoses:   Dehydration   Diarrhea, unspecified type   Malignant neoplasm of anal canal (H)   Closed fracture of one rib of right side,  initial encounter   Fall, initial encounter     I, Karl Martinez, am serving as a trained medical scribe to document services personally performed by Giorgio Fontanez MD, based on the provider's statements to me.      I, Giorgio Fontanez MD, was physically present and have reviewed and verified the accuracy of this note documented by Karl Martinez.     3/19/2018   [facility] EMERGENCY DEPARTMENT    This note was created at least in part by the use of dragon voice dictation system. Inadvertent typographical errors may still exist.  Giorgio Fontanez MD.         Giorgio Fontanez MD  03/19/18 1983

## 2018-03-19 NOTE — H&P
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      #Diarrhea, nausea-  likely related related to her chemoradiation.  5-FU related diarrhea combined with local mucosal irritation from radiation therapy is likely etiology.  Rule out infectious causes of diarrhea with stool studies.  - supportive care with Loperamide, Zofran   - C. Diff, enteric stool panel  -  cc/hr     # Falls-likely related to dehydration.  May have some side effects from tincture of opium as well.  No focal neurologic deficits on examination.  She did not hit her head, currently no indication for CT scan.   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 Primary oncologist Dr. Ray. Completed chemotherapy and currently undergoing radiation.  Plan to continue radiation during hospitalization.  - Lidocaine cream to rectum prn     PPX: Lovenox  Code: Full  Diet: Regular    Patient seen and discussed with Dr. Howe who agrees with plan.       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 Course has been complicated by mouth sores, neutropenia, hypokalemia, dehydration requiring IV fluids.  She presented for weekly radiation oncology clinic visit today, reported increased confusion and unsteadiness over the past 3 or 4 days.  She was seen in the emergency department for the symptoms on 3/17.  During the emergency department  visit, she ate some red Jell-O which she brought home and proceeded to finish the Jell-O. Shortly after, she had explosive diarrhea and one episode of regurgitation of Jell-O.  She then had another episode of diarrhea.  she had a fall and landed on her back and right hip after this.  No head trauma or loss of consciousness.      She was started on tincture of opium for diarrhea last week.  She is having 5-6 diarrhea stools per day.  She has  bilateral lower extremity edema which she also had after her first infusion of 5-FU.  She was referred to emergency department due to concern that she could not take care of herself at home due to toxicities from her chemoradiation.  Elizabeth states that she has lots of support from her friends and neighbors who live in her building and close by.  She lives alone.    She denies any fevers, chills, blood in the stool.  She has been having pain in the anal area which has been improved with lidocaine cream.           ONCOLOGY HISTORY: (copied from last oncology note) (primary oncologist Dr. Ray)   77-year-old female who developed bright red blood per rectum started about 4 years ago and progressively got worse. She underwent a colonoscopy in April 2017 which noted to have small anal fissure along with internal hemorrhoids. Symptoms continued and  she was referred to colorectal surgery for further evaluation. On exam she was found to have an 1.5 cm anal lesion on the left side along with left groin palpable adenopathy. Biopsy of anal mass came back positive for squamous cell carcinoma. Patient underwent staging workup with MRI pelvis and a PET scan- showed PET avid left anal canal mass along with small left inguinal FDG avid lymph nodes.      02/12/18- Started on concurrent chemoradiation with 5FU/Mitomycin. 2nd dose of MItomycin omitted due to symptoms           Past Medical History:     Past Medical History:   Diagnosis Date     Anal cancer (H)      Endometriosis, site unspecified   "    Thyroiditis, unspecified     Thryoiditis-resolved             Past Surgical History:      Past Surgical History:   Procedure Laterality Date     APPENDECTOMY      as a child     COLONOSCOPY N/A 4/13/2017    Procedure: COLONOSCOPY;  Surgeon: Juan Dos Santos MD;  Location: [facility]     INSERT PORT VASCULAR ACCESS Right 2/8/2018    Procedure: INSERT PORT VASCULAR ACCESS;  Place Single Lumen Venous Chest Port Placement;  Surgeon: Zaire Moreira PA-C;  Location: [facility] OR     SURGICAL HISTORY OF -       endometriosis             Social History:     Social History   Substance Use Topics     Smoking status: Never Smoker     Smokeless tobacco: Never Used     Alcohol use No      Lives alone in apartment in [place]. Previously . Now single. Has two sisters. Multiple friends who have been helping out.          Family History:     Family History   Problem Relation Age of Onset     CANCER Sister      CANCER Maternal Grandmother      lymphoma     HEART DISEASE Father      MI     Uterine Cancer Niece      Family history reviewed          Immunizations:   Immunization status is unknown          Allergies:     Allergies   Allergen Reactions     Darvon [Propoxyphene]      Had reaction in teen years     Penicillins      As a child     Ketoconazole Rash             Medications:     (Not in a hospital admission)          Review of Systems:   The Review of Systems is negative other than noted in the HPI      /57  Pulse 80  Temp 95.4  F (35.2  C) (Oral)  Resp 18  Ht 1.651 m (5' 5\")  SpO2 96%  BMI 21.77 kg/m2  Gen: Alert, NAD, thin   HEENT: MMM, no oral lesions  Neck: supple  Lymph: no cervical, sc, axillary LAD   CV: RRR, no murmurs  Resp: CTAB  Abd: Soft, NTND, no HSM   Ext: no edema   Skin: Port in place, c/d/i   Neuro: AOX3, no focal deficits, tangential            Data:     Lab Results   Component Value Date    WBC 1.3 (L) 03/17/2018    HGB 12.0 03/17/2018    HCT 35.9 03/17/2018     " 03/17/2018     03/17/2018    POTASSIUM 3.4 03/17/2018    CHLORIDE 104 03/17/2018    CO2 21 03/17/2018    BUN 15 03/17/2018    CR 0.79 03/17/2018     (H) 03/17/2018    SED 25 12/05/2012    TROPI 0.035 03/17/2018    AST 27 03/17/2018    ALT 25 03/17/2018    ALKPHOS 49 03/17/2018    BILITOTAL 1.0 03/17/2018    RADHA 36 03/17/2018    INR 1.46 (H) 03/17/2018          Jazmine Johns MD

## 2018-03-19 NOTE — IP AVS SNAPSHOT
"` `     [address] [facility]: [phone]                                              INTERAGENCY TRANSFER FORM - NURSING   3/19/2018                    Hospital Admission Date: 3/19/2018  GERHARD SU   : 1940  Sex: Female        Attending Provider: Aleksandar Howe MD     Allergies:  Darvon [Propoxyphene], Penicillins, Ketoconazole    Infection:  None   Service:  ONCOLOGY    Ht:  1.651 m (5' 5\")   Wt:  59.6 kg (131 lb 8 oz)   Admission Wt:  57.6 kg (126 lb 14.4 oz)    BMI:  21.88 kg/m 2   BSA:  1.65 m 2            Patient PCP Information     Provider PCP Type    Felisha Davis NP General      Current Code Status     Date Active Code Status Order ID Comments User Context       3/19/2018  3:51 PM Full Code [id number]  Jazmine Johns MD Inpatient       Code Status History     Date Active Date Inactive Code Status Order ID Comments User Context    This patient has a current code status but no historical code status.      Advance Directives        Scanned docmt in ACP Activity?           No scanned doc        Hospital Problems as of 2018              Priority Class Noted POA    Diarrhea Medium  3/19/2018 Yes      Non-Hospital Problems as of 2018              Priority Class Noted    CARDIOVASCULAR SCREENING; LDL GOAL LESS THAN 160 Medium  10/31/2010    Dry eye syndrome Medium  2012    Malignant neoplasm of anal canal (H) Medium  2018      Immunizations     None         END      ASSESSMENT     Discharge Profile Flowsheet     EXPECTED DISCHARGE     Patient's communication style  spoken language (English or Bilingual) 18 1822    Expected Discharge Date  -- (TCU) 18 1105   SKIN      DISCHARGE NEEDS ASSESSMENT     Inspection of bony prominences  Full 18 1154    Equipment Currently Used at Home  -- (4WW vs SPC at home) 18 1821   Inspection under devices  Full 18 1154    Transportation Available  family or friend will provide 18 1509   Skin WDL  ex " "04/04/18 1154    GASTROINTESTINAL (ADULT,PEDIATRIC,OB)     Skin Color/Characteristics  redness blanchable 04/04/18 1154    GI WDL  ex 04/04/18 1154   Skin Temperature  warm 04/04/18 1154    Abdominal Appearance  distended 04/03/18 1801   Skin Moisture  moist 04/04/18 1154    All Quadrants Palpation  guarding (distended) 04/03/18 2224   Skin Integrity  drain/device(s) 04/04/18 1154    Last Bowel Movement  04/04/18 04/04/18 1154   Additional Documentation  Wound (LDA) 04/03/18 1335    GI Signs/Symptoms  diarrhea 04/04/18 1154   SAFETY      Passing flatus  yes 04/04/18 1154   Safety WDL  WDL 04/04/18 0157    COMMUNICATION ASSESSMENT     All Alarms  none present 04/04/18 0157                 Assessment WDL (Within Defined Limits) Definitions           Safety WDL     Effective: 09/28/15    Row Information: <b>WDL Definition:</b> Bed in low position, wheels locked; call light in reach; upper side rails up x 2; ID band on<br> <font color=\"gray\"><i>Item=AS safety wdl>>List=AS safety wdl>>Version=F14</i></font>      Skin WDL     Effective: 09/28/15    Row Information: <b>WDL Definition:</b> Warm; dry; intact; elastic; without discoloration; pressure points without redness<br> <font color=\"gray\"><i>Item=AS skin wdl>>List=AS skin wdl>>Version=F14</i></font>      Vitals     Vital Signs Flowsheet     QUICK ADDS     Change in Pain  about the same 04/04/18 1144    Quick Adds  Comments 03/27/18 1553   Pain Control  partially effective 04/03/18 2020    COMMENTS     Functioning  can do most things, but pain gets in the way of some 04/04/18 1144    Comments  Physcial therapy  03/27/18 1553   Sleep  awake with occasional pain 04/03/18 2020    VITAL SIGNS     ANALGESIA SIDE EFFECTS MONITORING      Temp  99.5  F (37.5  C) 04/04/18 1115   Side Effects Monitoring: Respiratory Quality  R 04/04/18 1144    Temp src  Oral 04/04/18 1115   Side Effects Monitoring: Respiratory Depth  N 04/04/18 1144    Resp  18 04/04/18 1115   Side Effects " "Monitoring: Sedation Level  1 04/04/18 1144    Pulse  99 04/04/18 1115   HEIGHT AND WEIGHT      Heart Rate  84 04/04/18 0717   Height  1.651 m (5' 5\") 03/19/18 1243    Pulse/Heart Rate Source  Monitor 04/03/18 2319   Height Method  -- 04/01/18 1442    BP  123/75 04/04/18 1115   Weight  59.6 kg (131 lb 8 oz) 04/03/18 1504    BP Location  Right arm 04/04/18 0440   Weight Method  Standing scale 03/31/18 1207    LYING ORTHOSTATIC BP     Estimated Weight (From ED)  59 kg (130 lb) 03/19/18 1243    Lying Orthostatic BP  126/89 03/27/18 1628   ESME COMA SCALE      Lying Orthostatic Pulse  97 bpm 03/27/18 1630   Best Eye Response  4-->(E4) spontaneous 03/19/18 1420    SITTING ORTHOSTATIC BP     Best Motor Response  6-->(M6) obeys commands 03/19/18 1420    Sitting Orthostatic BP  101/65 03/27/18 1628   Best Verbal Response  5-->(V5) oriented 03/19/18 1420    Sitting Orthostatic Pulse  95 bpm 03/27/18 1630   Esme Coma Scale Score  15 03/19/18 1420    OXYGEN THERAPY     POSITIONING      SpO2  92 % 04/04/18 1115   Body Position  turned 04/03/18 2224    O2 Device  None (Room air) 04/04/18 1115   Head of Bed (HOB)  HOB flat 04/03/18 2224    Oxygen Delivery  1 LPM 04/03/18 0618   Positioning/Transfer Devices  pillows;in use 04/03/18 0944    PAIN/COMFORT     DAILY CARE      Patient Currently in Pain  yes 04/04/18 1144   Activity Management  activity adjusted per tolerance 04/04/18 0157    Preferred Pain Scale  CAPA (Clinically Aligned Pain Assessment) ([facility], [facility] and [facility] Adults Only) 04/04/18 1144   Activity Assistance Provided  assistance, 2 people 04/04/18 0157    Pain Location  Abdomen 04/04/18 1144   Assistive Device Utilized  walker 04/03/18 1335    Pain Orientation  Mid 04/04/18 1144   Additional Documentation  Activity Device Assistance (Row) 04/02/18 1142    Pain Descriptors  Discomfort 04/04/18 1144   STANDING ORTHOSTATIC BP      Pain Management Interventions  analgesia administered 04/04/18 1144   Standing " Orthostatic BP  87/54 03/28/18 1626    Pain Intervention(s)  Medication (See eMAR) 04/04/18 1144   Standing Orthostatic Pulse  95 bpm 03/27/18 1630    Response to Interventions  Decrease in pain 04/04/18 1144   POINT OF CARE TESTING      CLINICALLY ALIGNED PAIN ASSESSMENT (CAPA) ([facility], [facility] AND [facility] ADULTS ONLY)     Puncture Site  fingertip 03/20/18 1038    Comfort  tolerable with discomfort 04/04/18 1144   Bedside Glucose (mg/dl )   89 mg/dl (going to radiation (informed); ice cream and IV fluids) 03/20/18 1038            Patient Lines/Drains/Airways Status    Active LINES/DRAINS/AIRWAYS     Name: Placement date: Placement time: Site: Days: Last dressing change:    Urethral Catheter 04/02/18   1015      2     Peripheral IV 03/30/18 Right;Lateral Lower forearm 03/30/18   0905   Lower forearm   5     Wound 03/21/18 Sacrum Burn open radiation burn on sacrum  03/21/18   1000   Sacrum   14     Incision/Surgical Site 02/08/18 Right Chest 02/08/18   1053    55             Patient Lines/Drains/Airways Status    Active PICC/CVC     Name: Placement date: Placement time: Site: Days: Additional Info Last dressing change:    Port A Cath Single 02/08/18 Right Chest wall 02/08/18   1044   Chest wall   55 Orientation: Right            Power Port: Yes            Inserted by: Sujit Moreira PA-C            Total Catheter Length (cm): 24 cm            Line Flushed (See MAR): Yes            MRI Compatible: Yes            Diameter Armenian Size: 6 Fr            Tip location: RA            Lot #: [id number]               Intake/Output Detail Report     Date Intake           Output     Net    Shift P.O. I.V. NG/GT IV Piggyback TPN Enteral Total Urine Emesis/NG output Stool Total       Day 04/03/18 0000 - 04/03/18 0659 -- -- -- -- 2594.93 -- 2594.93 800 -- -- 800 1794.93    Ruma 04/03/18 0700 - 04/03/18 1459 -- -- -- -- 320 -- 320 575 -- -- 575 -255    Noc 04/03/18 1500 - 04/03/18 2359 120 -- -- -- 533.2 -- 653.2 725 -- -- 723  -71.8    Day 04/04/18 0000 - 04/04/18 0659 -- -- -- -- 495.6 -- 495.6 750 -- -- 750 -254.4    Ruma 04/04/18 0700 - 04/04/18 1459 -- -- -- -- 390.8 -- 390.8 600 -- -- 600 -209.2      Last Void/BM       Most Recent Value    Urine Occurrence 1 at 04/02/2018 0813    Stool Occurrence 2 at 04/03/2018 0413      Case Management/Discharge Planning     Case Management/Discharge Planning Flowsheet     LIVING ENVIRONMENT     MH/BH CAREGIVER      Lives With  alone 03/23/18 1346   Filed Complexity Screen Score  5 03/20/18 0918    Living Arrangements  apartment 03/23/18 1346   ABUSE RISK SCREEN      COPING/STRESS     QUESTION TO PATIENT:  Has a member of your family or a partner(now or in the past) intimidated, hurt, manipulated, or controlled you in any way?  patient declined to answer or is unable to answer 03/19/18 1245    Major Change/Loss/Stressor  illness;hospitalization 03/19/18 1904   QUESTION TO PATIENT: Do you feel safe going back to the place where you are living?  patient declined to answer or is unable to answer 03/19/18 1245    EXPECTED DISCHARGE     OBSERVATION: Is there reason to believe there has been maltreatment of a vulnerable adult (ie. Physical/Sexual/Emotional abuse, self neglect, lack of adequate food, shelter, medical care, or financial exploitation)?  no 03/19/18 1245    Expected Discharge Date  -- (TCU) 03/22/18 1105   OTHER      DISCHARGE PLANNING     Are you depressed or being treated for depression?  No 03/19/18 1900    Transportation Available  family or friend will provide 03/23/18 1509   HOMICIDE RISK      FINAL RESOURCES     Feels Like Hurting Others  no 03/19/18 1245    Equipment Currently Used at Home  -- (4WW vs SPC at home) 03/21/18 7850

## 2018-03-19 NOTE — IP AVS SNAPSHOT
MRN:[id number]                      After Visit Summary   3/19/2018    Elizabeth Taylor    MRN: [id number]           Thank you!     Thank you for choosing [facility] for your care. Our goal is always to provide you with excellent care. Hearing back from our patients is one way we can continue to improve our services. Please take a few minutes to complete the written survey that you may receive in the mail after you visit with us. Thank you!        Patient Information     Date Of Birth          1940        Designated Caregiver       Most Recent Value    Caregiver    Will someone help with your care after discharge? no      About your hospital stay     You were admitted on:  March 19, 2018 You last received care in the:  [address] [facility] [place]    You were discharged on:  April 4, 2018        Reason for your hospital stay       You were admitted for complications from chemotherapy, including diarrhea/fatigue/weight loss. You are improving from these complications, but still require assistance at rehab.                  Who to Call     For medical emergencies, please call 911.  For non-urgent questions about your medical care, please call your primary care provider or clinic, [phone]          Attending Provider     Provider Specialty    Giorgio Fontanez MD Emergency Medicine    [facility], Aleksandar Greene MD Oncology       Primary Care Provider Office Phone # Fax #    Felisha Davis [phone]      After Care Instructions     Activity - Up with nursing assistance           Advance Diet as Tolerated       Follow this diet upon discharge: Full liquid diet and advance diet as tolerated. Also has TPN (components and rate were faxed).            Daily weights       Call Provider for weight gain of more than 2 pounds per day or 5 pounds per week.            Fall precautions           Joiner catheter       To straight gravity drainage. Change catheter every 2 weeks (last changed  4/2/18) and PRN for leaking or decreased uring output with signs of bladder distention. DO NOT change catheter without a specific MD order IF diagnosis of benign prostatic hypertrophy (BPH), neurogenic bladder, or other urological conditions.            General info for SNF       Length of Stay Estimate: Short Term Care: Estimated # of Days <30  Condition at Discharge: Stable  Level of care:skilled   Rehabilitation Potential: Good  Admission H&P remains valid and up-to-date: Yes  Recent Chemotherapy: Date: 3/23/18 (now complete)                     Use Nursing Home Standing Orders: Yes            Glucose monitor nursing POCT       Before meals and at bedtime            Intake and output       Every shift            Mantoux instructions       Give two-step Mantoux (PPD) Per Facility Policy Yes            Oxygen - Nasal cannula       1-2 Lpm by nasal cannula to keep O2 sats 92% or greater.            Wound care (specify)       Site:   Bilateral buttocks, perineum, groin, and perianal area wound BID and PRN  Instructions: Cleanse with CHAU cleanser very VERY gently and apply generous amount of Proshield skin protectant on affected areas.  Ensure pt has Geomatt cushion in the chair while sitting up in the chair.                  Follow-up Appointments     Follow Up and recommended labs and tests       You have a follow-up appointment with your primary oncologist:    [facility] [place]  [address] [place] [address] [address] [place] [id number]  Dr. Ray at 10:00 am                  Your next 10 appointments already scheduled     Apr 06, 2018 10:00 AM CDT   [facility] Draw with  [facility] DRAW   [facility] Draw ([facility])    [address] [place] [address]  [address]  [facility] [address]   [phone]            Apr 06, 2018 10:30 AM CDT   (Arrive by 10:15 AM)   Return Visit with Shen Ray MD   [facility] ([facility])    [address]  [address]  [facility] [address]   [phone]            Apr 12, 2018 11:00 AM CDT   Return Visit with GRAHAM Cook CNP   [facility] Clinic ([facility])    [facility]  1st Floor  [address] [address]   [phone]            Apr 27, 2018 11:30 AM CDT   Return Visit with Zaire Madison MD   Radiation Oncology Clinic ([facility])    [facility]  1st Floor  [address] [address]   [phone]              Additional Services     Occupational Therapy Adult Consult       Evaluate and treat as clinically indicated.    Reason:  Deconditioning            Physical Therapy Adult Consult       Evaluate and treat as clinically indicated.    Reason:  Deconditioning                  Future tests that were ordered for you     **Comprehensive metabolic panel FUTURE anytime           AntiEmbolism Stockings       Bilateral below knee length.On in the morning, off at night            CBC with platelets differential       Last Lab Result: Hemoglobin (g/dL)       Date                     Value                 04/04/2018               9.7 (L)          ----------                  Pending Results     Date and Time Order Name Status Description    4/2/2018 1200 Urine Culture Aerobic Bacterial Preliminary             Statement of Approval     Ordered          04/04/18 1158  I have reviewed and agree with all the recommendations and orders detailed in this document.  EFFECTIVE NOW     Approved and electronically signed by:  Brenda Gurrola PA-C             Admission Information     Date & Time Provider Department Dept. Phone    3/19/2018 Aleksandar Howe MD [address] [facility] [phone]      Your Vitals Were     Blood Pressure Pulse Temperature Respirations Height Weight    123/75 99 99.5  F (37.5  C) (Oral) 18 1.651 m (5' 5\") 59.6 kg (131 lb 8 oz)    Pulse " Oximetry BMI (Body Mass Index)                92% 21.88 kg/m2          MyChart Information     [facility] gives you secure access to your electronic health record. If you see a primary care provider, you can also send messages to your care team and make appointments. If you have questions, please call your primary care clinic.  If you do not have a primary care provider, please call [phone] and they will assist you.        Care EveryWhere ID     This is your Care EveryWhere ID. This could be used by other organizations to access your [facility] medical records  [id number]        Equal Access to Services     [facility]: Hadjesse Grubbs, wajovi ascencio, melida gomes, cyndy walton . So [facility] [phone].    ATENCIÓN: Si habla español, tiene a ghosh disposición servicios gratuitos de asistencia lingüística. [facility] [phone].    We comply with applicable federal civil rights laws and [place] laws. We do not discriminate on the basis of race, color, national origin, age, disability, sex, sexual orientation, or gender identity.               Review of your medicines      START taking        Dose / Directions    artificial saliva Liqd liquid   Used for:  Mouth dryness        Dose:  5-10 mL   Swish and spit 5-10 mLs in mouth 4 times daily as needed for dry mouth   Refills:  0       calcium-vitamin D 600-400 MG-UNIT per tablet   Commonly known as:  CALTRATE   Used for:  Age-related osteoporosis with current pathological fracture, initial encounter   Replaces:  CALCIUM 500/VITAMIN D PO        Dose:  1 tablet   Take 1 tablet by mouth daily   Quantity:  60 tablet   Refills:  0       diphenoxylate-atropine 2.5-0.025 MG per tablet   Commonly known as:  LOMOTIL   Used for:  Malignant neoplasm of anal canal (H)        Dose:  1 tablet   Take 1 tablet by mouth 4 times daily as needed for diarrhea . If having less than two bowel movements daily, DO NOT GIVE   Quantity:   40 tablet   Refills:  0       methocarbamol 500 MG tablet   Commonly known as:  ROBAXIN   Used for:  Closed fracture of one rib of right side, initial encounter        Dose:  500 mg   Take 1 tablet (500 mg) by mouth 4 times daily as needed for muscle spasms   Quantity:  120 tablet   Refills:  0       multivitamin, therapeutic Tabs tablet   Used for:  Age-related osteoporosis with current pathological fracture, initial encounter        Dose:  1 tablet   Take 1 tablet by mouth daily   Refills:  0       ondansetron 8 MG ODT tab   Commonly known as:  ZOFRAN-ODT   Used for:  Malignant neoplasm of anal canal (H)        Dose:  8 mg   Take 1 tablet (8 mg) by mouth every 8 hours as needed For nausea   Quantity:  60 tablet   Refills:  0       simethicone 40 MG/0.6ML suspension   Commonly known as:  MYLICON   Used for:  Malignant neoplasm of anal canal (H), Flatulence, eructation and gas pain        Dose:  40 mg   Take 0.6 mLs (40 mg) by mouth every 6 hours as needed for cramping   Refills:  0       traMADol 50 MG tablet   Commonly known as:  ULTRAM   Used for:  Closed fracture of one rib of right side, initial encounter        Dose:  50 mg   Take 1 tablet (50 mg) by mouth every 6 hours   Quantity:  20 tablet   Refills:  0         CONTINUE these medicines which may have CHANGED, or have new prescriptions. If we are uncertain of the size of tablets/capsules you have at home, strength may be listed as something that might have changed.        Dose / Directions    acetaminophen 325 MG tablet   Commonly known as:  TYLENOL   This may have changed:    - medication strength  - when to take this   Used for:  Closed fracture of one rib of right side, initial encounter        Dose:  650 mg   Take 2 tablets (650 mg) by mouth every 4 hours as needed for pain   Refills:  0       loperamide 2 MG tablet   Commonly known as:  LOPERAMIDE A-D   This may have changed:  additional instructions   Used for:  Malignant neoplasm of anal canal (H)         Dose:  2 mg   Take 1 tablet (2 mg) by mouth 4 times daily as needed for diarrhea . If having less than two bowel movements daily, DO NOT GIVE   Quantity:  60 tablet   Refills:  3         CONTINUE these medicines which have NOT CHANGED        Dose / Directions    hydrocortisone 2.5 % cream   Commonly known as:  ANUSOL-HC   Used for:  External hemorrhoids        Place rectally 2 times daily   Quantity:  30 g   Refills:  1       LORazepam 0.5 MG tablet   Commonly known as:  ATIVAN   Used for:  Malignant neoplasm of anal canal (H)        Dose:  0.5 mg   Take 1 tablet (0.5 mg) by mouth every 4 hours as needed (Anxiety, Nausea/Vomiting or Sleep)   Quantity:  30 tablet   Refills:  0       magic mouthwash suspension   Commonly known as:  ENTER INGREDIENTS IN COMMENTS   Used for:  Malignant neoplasm of anal canal (H)        Swish and spit 5-10 mL four times daily as needed   Quantity:  500 mL   Refills:  1       vitamin B complex with vitamin C Tabs tablet        Dose:  1 tablet   Take 1 tablet by mouth daily   Refills:  0         STOP taking     CALCIUM 500/VITAMIN D PO   Replaced by:  calcium-vitamin D 600-400 MG-UNIT per tablet           lidocaine-prilocaine cream   Commonly known as:  EMLA           opium tincture dilute (1 mg/mL morphine equivalent)           pramipexole 0.125 MG tablet   Commonly known as:  MIRAPEX           prochlorperazine 10 MG tablet   Commonly known as:  COMPAZINE                Where to get your medicines      Some of these will need a paper prescription and others can be bought over the counter. Ask your nurse if you have questions.     Bring a paper prescription for each of these medications     diphenoxylate-atropine 2.5-0.025 MG per tablet    LORazepam 0.5 MG tablet    traMADol 50 MG tablet       You don't need a prescription for these medications     acetaminophen 325 MG tablet    artificial saliva Liqd liquid    calcium-vitamin D 600-400 MG-UNIT per tablet    loperamide 2 MG tablet     methocarbamol 500 MG tablet    multivitamin, therapeutic Tabs tablet    ondansetron 8 MG ODT tab    simethicone 40 MG/0.6ML suspension                Protect others around you: Learn how to safely use, store and throw away your medicines at www.disposemymeds.org.        Information about OPIOIDS     PRESCRIPTION OPIOIDS: WHAT YOU NEED TO KNOW    Prescription opioids can be used to help relieve moderate to severe pain and are often prescribed following a surgery or injury, or for certain health conditions. These medications can be an important part of treatment but also come with serious risks. It is important to work with your health care provider to make sure you are getting the safest, most effective care.    WHAT ARE THE RISKS AND SIDE EFFECTS OF OPIOID USE?  Prescription opioids carry serious risks of addiction and overdose, especially with prolonged use. An opioid overdose, often marked by slowed breathing can cause sudden death. The use of prescription opioids can have a number of side effects as well, even when taken as directed:      Tolerance - meaning you might need to take more of a medication for the same pain relief    Physical dependence - meaning you have symptoms of withdrawal when a medication is stopped    Increased sensitivity to pain    Constipation    Nausea, vomiting, and dry mouth    Sleepiness and dizziness    Confusion    Depression    Low levels of testosterone that can result in lower sex drive, energy, and strength    Itching and sweating    RISKS ARE GREATER WITH:    History of drug misuse, substance use disorder, or overdose    Mental health conditions (such as depression or anxiety)    Sleep apnea    Older age (65 years or older)    Pregnancy    Avoid alcohol while taking prescription opioids.   Also, unless specifically advised by your health care provider, medications to avoid include:    Benzodiazepines (such as Xanax or Valium)    Muscle relaxants (such as Soma or  Flexeril)    Hypnotics (such as Ambien or Lunesta)    Other prescription opioids    KNOW YOUR OPTIONS:  Talk to your health care provider about ways to manage your pain that do not involve prescription opioids. Some of these options may actually work better and have fewer risks and side effects:    Pain relievers such as acetaminophen, ibuprofen, and naproxen    Some medications that are also used for depression or seizures    Physical therapy and exercise    Cognitive behavioral therapy, a psychological, goal-directed approach, in which patients learn how to modify physical, behavioral, and emotional triggers of pain and stress    IF YOU ARE PRESCRIBED OPIOIDS FOR PAIN:    Never take opioids in greater amounts or more often than prescribed    Follow up with your primary health care provider and work together to create a plan on how to manage your pain.    Talk about ways to help manage your pain that do not involve prescription opioids    Talk about all concerns and side effects    Help prevent misuse and abuse    Never sell or share prescription opioids    Never use another person's prescription opioids    Store prescription opioids in a secure place and out of reach of others (this may include visitors, children, friends, and family)    Visit www.cdc.gov/drugoverdose to learn about risks of opioid abuse and overdose    If you believe you may be struggling with addiction, tell your health care provider and ask for guidance or call [facility]'s National Helpline at 8-027-003-HELP    LEARN MORE / www.cdc.gov/drugoverdose/prescribing/guideline.html    Safely dispose of unused prescription opioids: Find your local drug take-back programs and more information about the importance of safe disposal at www.doseofreality.mn.gov             Medication List: This is a list of all your medications and when to take them. Check marks below indicate your daily home schedule. Keep this list as a reference.      Medications            Morning Afternoon Evening Bedtime As Needed    acetaminophen 325 MG tablet   Commonly known as:  TYLENOL   Take 2 tablets (650 mg) by mouth every 4 hours as needed for pain   Last time this was given:  650 mg on 4/2/2018  7:59 AM                                artificial saliva Liqd liquid   Swish and spit 5-10 mLs in mouth 4 times daily as needed for dry mouth                                calcium-vitamin D 600-400 MG-UNIT per tablet   Commonly known as:  CALTRATE   Take 1 tablet by mouth daily   Last time this was given:  1 tablet on 4/3/2018  5:28 PM                                diphenoxylate-atropine 2.5-0.025 MG per tablet   Commonly known as:  LOMOTIL   Take 1 tablet by mouth 4 times daily as needed for diarrhea . If having less than two bowel movements daily, DO NOT GIVE   Last time this was given:  1 tablet on 4/2/2018  4:03 PM                                hydrocortisone 2.5 % cream   Commonly known as:  ANUSOL-HC   Place rectally 2 times daily   Last time this was given:  4/4/2018  8:11 AM                                loperamide 2 MG tablet   Commonly known as:  LOPERAMIDE A-D   Take 1 tablet (2 mg) by mouth 4 times daily as needed for diarrhea . If having less than two bowel movements daily, DO NOT GIVE                                LORazepam 0.5 MG tablet   Commonly known as:  ATIVAN   Take 1 tablet (0.5 mg) by mouth every 4 hours as needed (Anxiety, Nausea/Vomiting or Sleep)                                magic mouthwash suspension   Commonly known as:  ENTER INGREDIENTS IN COMMENTS   Swish and spit 5-10 mL four times daily as needed                                methocarbamol 500 MG tablet   Commonly known as:  ROBAXIN   Take 1 tablet (500 mg) by mouth 4 times daily as needed for muscle spasms   Last time this was given:  500 mg on 3/24/2018  9:09 AM                                multivitamin, therapeutic Tabs tablet   Take 1 tablet by mouth daily   Last time this was given:  1 tablet on  3/25/2018 10:16 AM                                ondansetron 8 MG ODT tab   Commonly known as:  ZOFRAN-ODT   Take 1 tablet (8 mg) by mouth every 8 hours as needed For nausea   Last time this was given:  8 mg on 3/28/2018 12:14 AM                                simethicone 40 MG/0.6ML suspension   Commonly known as:  MYLICON   Take 0.6 mLs (40 mg) by mouth every 6 hours as needed for cramping                                traMADol 50 MG tablet   Commonly known as:  ULTRAM   Take 1 tablet (50 mg) by mouth every 6 hours   Last time this was given:  50 mg on 4/4/2018  2:31 PM                                vitamin B complex with vitamin C Tabs tablet   Take 1 tablet by mouth daily   Last time this was given:  1 tablet on 3/25/2018 10:16 AM

## 2018-03-19 NOTE — IP AVS SNAPSHOT
"    UNIT [address]D [facility]: [phone]                                              INTERAGENCY TRANSFER FORM - PHYSICIAN ORDERS   3/19/2018                    Hospital Admission Date: 3/19/2018  GERHARD SU   : 1940  Sex: Female        Attending Provider: Aleksandar Howe MD     Allergies:  Darvon [Propoxyphene], Penicillins, Ketoconazole    Infection:  None   Service:  ONCOLOGY    Ht:  1.651 m (5' 5\")   Wt:  59.6 kg (131 lb 8 oz)   Admission Wt:  57.6 kg (126 lb 14.4 oz)    BMI:  21.88 kg/m 2   BSA:  1.65 m 2            Patient PCP Information     Provider PCP Type    Felisha Davis NP General      ED Clinical Impression     Diagnosis Description Comment Added By Time Added    Dehydration [E86.0] Dehydration [E86.0]  Giorgio Fontanez MD 3/19/2018  1:48 PM    Diarrhea, unspecified type [R19.7] Diarrhea, unspecified type [R19.7]  Giorgio Fontanez MD 3/19/2018  6:04 PM    Malignant neoplasm of anal canal (H) [C21.1] Malignant neoplasm of anal canal (H) [C21.1]  Giorgio Fontanez MD 3/19/2018  6:04 PM    Closed fracture of one rib of right side, initial encounter [S22.31XA] Closed fracture of one rib of right side, initial encounter [S22.31XA]  Giorgio Fontanez MD 3/19/2018  6:04 PM    Fall, initial encounter [W19.XXXA] Fall, initial encounter [W19.XXXA]  Giorgio Fontanez MD 3/19/2018  6:04 PM      Hospital Problems as of 2018              Priority Class Noted POA    Diarrhea Medium  3/19/2018 Yes      Non-Hospital Problems as of 2018              Priority Class Noted    CARDIOVASCULAR SCREENING; LDL GOAL LESS THAN 160 Medium  10/31/2010    Dry eye syndrome Medium  2012    Malignant neoplasm of anal canal (H) Medium  2018      Code Status History     Date Active Date Inactive Code Status Order ID Comments User Context    This patient has a current code status but no historical code status.         Medication Review      START taking        Dose / Directions " Comments    artificial saliva Liqd liquid   Used for:  Mouth dryness        Dose:  5-10 mL   Swish and spit 5-10 mLs in mouth 4 times daily as needed for dry mouth   Refills:  0        calcium-vitamin D 600-400 MG-UNIT per tablet   Commonly known as:  CALTRATE   Used for:  Age-related osteoporosis with current pathological fracture, initial encounter   Replaces:  CALCIUM 500/VITAMIN D PO        Dose:  1 tablet   Take 1 tablet by mouth daily   Quantity:  60 tablet   Refills:  0        diphenoxylate-atropine 2.5-0.025 MG per tablet   Commonly known as:  LOMOTIL   Used for:  Malignant neoplasm of anal canal (H)        Dose:  1 tablet   Take 1 tablet by mouth 4 times daily as needed for diarrhea . If having less than two bowel movements daily, DO NOT GIVE   Quantity:  40 tablet   Refills:  0        methocarbamol 500 MG tablet   Commonly known as:  ROBAXIN   Used for:  Closed fracture of one rib of right side, initial encounter        Dose:  500 mg   Take 1 tablet (500 mg) by mouth 4 times daily as needed for muscle spasms   Quantity:  120 tablet   Refills:  0        multivitamin, therapeutic Tabs tablet   Used for:  Age-related osteoporosis with current pathological fracture, initial encounter        Dose:  1 tablet   Take 1 tablet by mouth daily   Refills:  0        ondansetron 8 MG ODT tab   Commonly known as:  ZOFRAN-ODT   Used for:  Malignant neoplasm of anal canal (H)        Dose:  8 mg   Take 1 tablet (8 mg) by mouth every 8 hours as needed For nausea   Quantity:  60 tablet   Refills:  0        simethicone 40 MG/0.6ML suspension   Commonly known as:  MYLICON   Used for:  Malignant neoplasm of anal canal (H), Flatulence, eructation and gas pain        Dose:  40 mg   Take 0.6 mLs (40 mg) by mouth every 6 hours as needed for cramping   Refills:  0        traMADol 50 MG tablet   Commonly known as:  ULTRAM   Used for:  Closed fracture of one rib of right side, initial encounter        Dose:  50 mg   Take 1 tablet (50  mg) by mouth every 6 hours   Quantity:  20 tablet   Refills:  0          CONTINUE these medications which may have CHANGED, or have new prescriptions. If we are uncertain of the size of tablets/capsules you have at home, strength may be listed as something that might have changed.        Dose / Directions Comments    acetaminophen 325 MG tablet   Commonly known as:  TYLENOL   This may have changed:    - medication strength  - when to take this   Used for:  Closed fracture of one rib of right side, initial encounter        Dose:  650 mg   Take 2 tablets (650 mg) by mouth every 4 hours as needed for pain   Refills:  0        loperamide 2 MG tablet   Commonly known as:  LOPERAMIDE A-D   This may have changed:  additional instructions   Used for:  Malignant neoplasm of anal canal (H)        Dose:  2 mg   Take 1 tablet (2 mg) by mouth 4 times daily as needed for diarrhea . If having less than two bowel movements daily, DO NOT GIVE   Quantity:  60 tablet   Refills:  3          CONTINUE these medications which have NOT CHANGED        Dose / Directions Comments    hydrocortisone 2.5 % cream   Commonly known as:  ANUSOL-HC   Used for:  External hemorrhoids        Place rectally 2 times daily   Quantity:  30 g   Refills:  1        LORazepam 0.5 MG tablet   Commonly known as:  ATIVAN   Used for:  Malignant neoplasm of anal canal (H)        Dose:  0.5 mg   Take 1 tablet (0.5 mg) by mouth every 4 hours as needed (Anxiety, Nausea/Vomiting or Sleep)   Quantity:  30 tablet   Refills:  0        magic mouthwash suspension   Commonly known as:  ENTER INGREDIENTS IN COMMENTS   Used for:  Malignant neoplasm of anal canal (H)        Swish and spit 5-10 mL four times daily as needed   Quantity:  500 mL   Refills:  1    - lidocaine visc 2% 2.5mL/5mL & maalox/mylanta w/ simeth 2.5mL/5mL & diphenhydramine 5mg/5mL    -     - Called to [facility]       vitamin B complex with vitamin C Tabs tablet        Dose:  1 tablet   Take 1 tablet by  mouth daily   Refills:  0          STOP taking     CALCIUM 500/VITAMIN D PO   Replaced by:  calcium-vitamin D 600-400 MG-UNIT per tablet           lidocaine-prilocaine cream   Commonly known as:  EMLA           opium tincture dilute (1 mg/mL morphine equivalent)           pramipexole 0.125 MG tablet   Commonly known as:  MIRAPEX           prochlorperazine 10 MG tablet   Commonly known as:  COMPAZINE                   Summary of Visit     Reason for your hospital stay       You were admitted for complications from chemotherapy, including diarrhea/fatigue/weight loss. You are improving from these complications, but still require assistance at rehab.             After Care     Activity - Up with nursing assistance           Advance Diet as Tolerated       Follow this diet upon discharge: Full liquid diet and advance diet as tolerated. Also has TPN (components and rate were faxed).       Daily weights       Call Provider for weight gain of more than 2 pounds per day or 5 pounds per week.       Fall precautions           Joiner catheter       To straight gravity drainage. Change catheter every 2 weeks (last changed 4/2/18) and PRN for leaking or decreased uring output with signs of bladder distention. DO NOT change catheter without a specific MD order IF diagnosis of benign prostatic hypertrophy (BPH), neurogenic bladder, or other urological conditions.       General info for SNF       Length of Stay Estimate: Short Term Care: Estimated # of Days <30  Condition at Discharge: Stable  Level of care:skilled   Rehabilitation Potential: Good  Admission H&P remains valid and up-to-date: Yes  Recent Chemotherapy: Date: 3/23/18 (now complete)                     Use Nursing Home Standing Orders: Yes       Glucose monitor nursing POCT       Before meals and at bedtime       Intake and output       Every shift       Mantoux instructions       Give two-step Mantoux (PPD) Per Facility Policy Yes       Wound care (specify)       Site:    Bilateral buttocks, perineum, groin, and perianal area wound BID and PRN  Instructions: Cleanse with CHAU cleanser very VERY gently and apply generous amount of Proshield skin protectant on affected areas.  Ensure pt has Geomatt cushion in the chair while sitting up in the chair.             Procedures     Oxygen - Nasal cannula       1-2 Lpm by nasal cannula to keep O2 sats 92% or greater.             Referrals     Occupational Therapy Adult Consult       Evaluate and treat as clinically indicated.    Reason:  Deconditioning       Physical Therapy Adult Consult       Evaluate and treat as clinically indicated.    Reason:  Deconditioning             Supplies     AntiEmbolism Stockings       Bilateral below knee length.On in the morning, off at night             Lab Orders     **Comprehensive metabolic panel FUTURE anytime           CBC with platelets differential       Last Lab Result: Hemoglobin (g/dL)       Date                     Value                 04/04/2018               9.7 (L)          ----------             Your next 10 appointments already scheduled     Apr 06, 2018 10:00 AM CDT   [facility] [employer] Lab Draw with  [facility] [employer] LAB DRAW   [facility] ([facility])    [address] [place] [id number]   [phone]            Apr 06, 2018 10:30 AM CDT   (Arrive by 10:15 AM)   Return Visit with Shen Ray MD   [facility] ([facility])    [address] [place] [id number]   [phone]            Apr 12, 2018 11:00 AM CDT   Return Visit with GRAHAM Cook CNP   [facility] ([facility])    [facility]  1st Floor  [address] [address]   [phone]            Apr 27, 2018 11:30 AM CDT   Return Visit with Zaire Madison MD   [facility] ([facility])    [facility]  Medical Ctr  1st Floor  [address] [address]   [phone]              Follow-Up Appointment Instructions     Future Labs/Procedures    Follow Up and recommended labs and tests     Comments:    You have a follow-up appointment with your primary oncologist:    [address] [address]  Dr. Ray at 10:00 am      Follow-Up Appointment Instructions     Follow Up and recommended labs and tests       You have a follow-up appointment with your primary oncologist:    [address] [address]  Dr. Ray at 10:00 am             Statement of Approval     Ordered          04/04/18 1158  I have reviewed and agree with all the recommendations and orders detailed in this document.  EFFECTIVE NOW     Approved and electronically signed by:  Brenda Gurrola PA-C

## 2018-03-19 NOTE — ED NOTES
Pt presents to ED with c/o fluid retention and confusion. Pt receiving chemo for anal cancer. Feels like she has been retaining more fluid over last 2 weeks. Pt fell this morning and hurt mid back, also fell earlier in week. Denies hitting head. Daughter feels pt has been more confused since last night. Pt A/Ox4, appears slightly forgetful/distracted. Pt has burning with urination.

## 2018-03-19 NOTE — IP AVS SNAPSHOT
"    UNIT [address] [facility]: [phone]                                              INTERAGENCY TRANSFER FORM - LAB / IMAGING / EKG / EMG RESULTS   3/19/2018                    Hospital Admission Date: 3/19/2018  GERHARD SU   : 1940  Sex: Female        Attending Provider: Aleksandar Howe MD     Allergies:  Darvon [Propoxyphene], Penicillins, Ketoconazole    Infection:  None   Service:  ONCOLOGY    Ht:  1.651 m (5' 5\")   Wt:  59.6 kg (131 lb 8 oz)   Admission Wt:  57.6 kg (126 lb 14.4 oz)    BMI:  21.88 kg/m 2   BSA:  1.65 m 2            Patient PCP Information     Provider PCP Type    Felisha Davis NP General         Lab Results - 3 Days      Urine Culture Aerobic Bacterial [[id number]] (Abnormal)  Resulted: 18 1007, Result status: Preliminary result    Ordering provider: Aleksandar Howe MD  18 1200 Resulting lab: [facility]    Specimen Information    Type Source Collected On   Unspecified Urine  18 1200          Components       Value Reference Range Flag Lab   Specimen Description Unspecified Urine      Special Requests Specimen received in preservative   75   Culture Micro Culture in progress   225   Culture Micro --  A 225   Result:         >100,000 colonies/mL  Candida albicans / dubliniensis  Candida albicans and Candida dubliniensis are not routinely speciated  Susceptibility testing not routinely done              CBC with platelets differential [[id number]] (Abnormal)  Resulted: 18 0925, Result status: Final result    Ordering provider: Aleksandar Howe MD  18 2330 Resulting lab: [facility]    Specimen Information    Type Source Collected On   Blood  18 0648          Components       Value Reference Range Flag Lab   WBC 7.4 4.0 - 11.0 10e9/L  51   RBC Count 3.24 3.8 - 5.2 10e12/L L 51   Hemoglobin 9.7 11.7 - 15.7 g/dL L 51   Hematocrit 30.2 35.0 - 47.0 % L 51   MCV " 93 78 - 100 fl  51   MCH 29.9 26.5 - 33.0 pg  51   MCHC 32.1 31.5 - 36.5 g/dL  51   RDW 15.1 10.0 - 15.0 % H 51   Platelet Count 302 150 - 450 10e9/L  51   Diff Method Manual Differential   51   % Neutrophils 94.7 %  51   % Lymphocytes 2.1 %  51   % Monocytes 0.0 %  51   % Eosinophils 1.0 %  51   % Basophils 0.0 %  51   % Myelocytes 1.1 %  51   % Promyelocytes 1.1 %  51   Absolute Neutrophil 7.0 1.6 - 8.3 10e9/L  51   Absolute Lymphocytes 0.2 0.8 - 5.3 10e9/L L 51   Absolute Monocytes 0.0 0.0 - 1.3 10e9/L  51   Absolute Eosinophils 0.1 0.0 - 0.7 10e9/L  51   Absolute Basophils 0.0 0.0 - 0.2 10e9/L  51   Absolute Myelocytes 0.1 0 10e9/L H 51   Absolute Promyeloctyes 0.1 0 10e9/L H 51   Poikilocytosis Slight   51   Platelet Estimate Confirming automated cell count   51            Comprehensive metabolic panel [[id number]] (Abnormal)  Resulted: 04/04/18 0819, Result status: Final result    Ordering provider: Aleksandar Howe MD  04/03/18 1081 Resulting lab: [facility]    Specimen Information    Type Source Collected On   Blood  04/04/18 0648          Components       Value Reference Range Flag Lab   Sodium 135 133 - 144 mmol/L  51   Potassium 4.0 3.4 - 5.3 mmol/L  51   Chloride 100 94 - 109 mmol/L  51   Carbon Dioxide 30 20 - 32 mmol/L  51   Anion Gap 5 3 - 14 mmol/L  51   Glucose 98 70 - 99 mg/dL  51   Urea Nitrogen 13 7 - 30 mg/dL  51   Creatinine 0.45 0.52 - 1.04 mg/dL L 51   GFR Estimate >90 >60 mL/min/1.7m2  51   Comment:  Non  GFR Calc   GFR Estimate If Black >90 >60 mL/min/1.7m2  51   Comment:  African American GFR Calc   Calcium 7.7 8.5 - 10.1 mg/dL L 51   Bilirubin Total 0.3 0.2 - 1.3 mg/dL  51   Albumin 1.0 3.4 - 5.0 g/dL L 51   Protein Total 4.4 6.8 - 8.8 g/dL L 51   Alkaline Phosphatase 100 40 - 150 U/L  51   ALT 27 0 - 50 U/L  51   AST 50 0 - 45 U/L H 51            Phosphorus [[id number]]  Resulted: 04/04/18 0749, Result status: Final result    Ordering  provider: Rosa Gonzalez APRN CNP  04/03/18 2330 Resulting lab: [facility]    Specimen Information    Type Source Collected On   Blood  04/04/18 0648          Components       Value Reference Range Flag Lab   Phosphorus 2.7 2.5 - 4.5 mg/dL  51            Magnesium [[id number]]  Resulted: 04/04/18 0749, Result status: Final result    Ordering provider: Brenda Gurrola PA-C  04/03/18 2330 Resulting lab: [facility]    Specimen Information    Type Source Collected On   Blood  04/04/18 0648          Components       Value Reference Range Flag Lab   Magnesium 1.8 1.6 - 2.3 mg/dL  51            INR [[id number]]  Resulted: 04/04/18 0731, Result status: Final result    Ordering provider: Aleksandar Howe MD  04/03/18 2330 Resulting lab: [facility]    Specimen Information    Type Source Collected On   Blood  04/04/18 0648          Components       Value Reference Range Flag Lab   INR 1.13 0.86 - 1.14  51            CBC with platelets differential [[id number]] (Abnormal)  Resulted: 04/03/18 0825, Result status: Final result    Ordering provider: Aleksandar Howe MD  04/02/18 2330 Resulting lab: [facility]    Specimen Information    Type Source Collected On   Blood  04/03/18 0723          Components       Value Reference Range Flag Lab   WBC 6.7 4.0 - 11.0 10e9/L  51   RBC Count 3.19 3.8 - 5.2 10e12/L L 51   Hemoglobin 9.6 11.7 - 15.7 g/dL L 51   Hematocrit 30.1 35.0 - 47.0 % L 51   MCV 94 78 - 100 fl  51   MCH 30.1 26.5 - 33.0 pg  51   MCHC 31.9 31.5 - 36.5 g/dL  51   RDW 15.2 10.0 - 15.0 % H 51   Platelet Count 265 150 - 450 10e9/L  51   Diff Method Manual Differential   51   % Neutrophils 92.7 %  51   % Lymphocytes 3.7 %  51   % Monocytes 0.0 %  51   % Eosinophils 0.9 %  51   % Basophils 1.8 %  51   % Myelocytes 0.9 %  51   Absolute Neutrophil 6.2 1.6 - 8.3 10e9/L  51    Absolute Lymphocytes 0.2 0.8 - 5.3 10e9/L L 51   Absolute Monocytes 0.0 0.0 - 1.3 10e9/L  51   Absolute Eosinophils 0.1 0.0 - 0.7 10e9/L  51   Absolute Basophils 0.1 0.0 - 0.2 10e9/L  51   Absolute Myelocytes 0.1 0 10e9/L H 51   RBC Morphology Normal   51   Platelet Estimate Confirming automated cell count   51            Comprehensive metabolic panel [[id number]] (Abnormal)  Resulted: 04/03/18 0757, Result status: Final result    Ordering provider: Aleksandar Howe MD  04/02/18 2330 Resulting lab: [facility]    Specimen Information    Type Source Collected On   Blood  04/03/18 0723          Components       Value Reference Range Flag Lab   Sodium 138 133 - 144 mmol/L  51   Potassium 4.2 3.4 - 5.3 mmol/L  51   Chloride 104 94 - 109 mmol/L  51   Carbon Dioxide 29 20 - 32 mmol/L  51   Anion Gap 5 3 - 14 mmol/L  51   Glucose 107 70 - 99 mg/dL H 51   Urea Nitrogen 13 7 - 30 mg/dL  51   Creatinine 0.47 0.52 - 1.04 mg/dL L 51   GFR Estimate >90 >60 mL/min/1.7m2  51   Comment:  Non  GFR Calc   GFR Estimate If Black >90 >60 mL/min/1.7m2  51   Comment:  African American GFR Calc   Calcium 7.4 8.5 - 10.1 mg/dL L 51   Bilirubin Total 0.5 0.2 - 1.3 mg/dL  51   Albumin 1.0 3.4 - 5.0 g/dL L 51   Protein Total 4.0 6.8 - 8.8 g/dL L 51   Alkaline Phosphatase 82 40 - 150 U/L  51   ALT 15 0 - 50 U/L  51   AST 20 0 - 45 U/L  51            Phosphorus [[id number]]  Resulted: 04/03/18 0755, Result status: Final result    Ordering provider: Rosa Gonzalez APRN CNP  04/02/18 2330 Resulting lab: [facility]    Specimen Information    Type Source Collected On   Blood  04/03/18 0723          Components       Value Reference Range Flag Lab   Phosphorus 2.6 2.5 - 4.5 mg/dL  51            Magnesium [[id number]]  Resulted: 04/03/18 0755, Result status: Final result    Ordering provider: Brenda Gurrola PA-C  04/02/18 3700 Resulting lab: [facility]  [facility]    Specimen Information    Type Source Collected On   Blood  04/03/18 0723          Components       Value Reference Range Flag Lab   Magnesium 1.9 1.6 - 2.3 mg/dL  51            INR [[id number]] (Abnormal)  Resulted: 04/03/18 0744, Result status: Final result    Ordering provider: Aleksandar Hwoe MD  04/02/18 2330 Resulting lab: [facility]    Specimen Information    Type Source Collected On   Blood  04/03/18 0723          Components       Value Reference Range Flag Lab   INR 1.18 0.86 - 1.14 H 51            UA with Microscopic reflex to Culture [[id number]] (Abnormal)  Resulted: 04/02/18 1258, Result status: Final result    Ordering provider: Brenda Gurrola PA-C  04/02/18 1136 Resulting lab: [facility]    Specimen Information    Type Source Collected On   Unspecified Urine  04/02/18 1200          Components       Value Reference Range Flag Lab   Color Urine Yellow   51   Appearance Urine Cloudy   51   Glucose Urine Negative NEG^Negative mg/dL  51   Bilirubin Urine Negative NEG^Negative  51   Ketones Urine Negative NEG^Negative mg/dL  51   Specific Gravity Urine 1.020 1.003 - 1.035  51   Blood Urine Small NEG^Negative A 51   pH Urine 5.5 5.0 - 7.0 pH  51   Protein Albumin Urine 30 NEG^Negative mg/dL A 51   Urobilinogen mg/dL Normal 0.0 - 2.0 mg/dL  51   Nitrite Urine Negative NEG^Negative  51   Leukocyte Esterase Urine Large NEG^Negative A 51   Source Unspecified Urine   51   WBC Urine 40 0 - 5 /HPF H 51   RBC Urine 27 0 - 2 /HPF H 51   Bacteria Urine Few NEG^Negative /HPF A 51   Yeast Urine Few NEG^Negative /HPF A 51   Hyaline Casts 3 0 - 2 /LPF H 51            UA with Microscopic reflex to Culture [[id number]] (Abnormal)  Resulted: 04/02/18 1135, Result status: Final result    Ordering provider: Brenda Gurrola PA-C  04/02/18 0858 Resulting lab: [facility]    Specimen  Information    Type Source Collected On   Midstream Urine Urine clean catch 04/02/18 1130          Components       Value Reference Range Flag Lab   Color Urine Canceled, Test credited   51   Comment:         Unsatisfactory specimen - too old for testing  NOTIFIED MATTHEW LONG ON 4/2/18 AT 1135 BY LS     Appearance Urine Canceled, Test credited   51   Comment:         Unsatisfactory specimen - too old for testing  NOTIFIED MATTHEW LONG ON 4/2/18 AT 1135 BY LS     Glucose Urine Canceled, Test credited NEG^Negative mg/dL A 51   Comment:         Unsatisfactory specimen - too old for testing  NOTIFIED MATTHEW LONG ON 4/2/18 AT 1135 BY LS     Bilirubin Urine Canceled, Test credited NEG^Negative A 51   Comment:         Unsatisfactory specimen - too old for testing  NOTIFIED MATTHEW LONG ON 4/2/18 AT 1135 BY LS     Ketones Urine Canceled, Test credited NEG^Negative mg/dL A 51   Comment:         Unsatisfactory specimen - too old for testing  NOTIFIED MATTHEW LONG ON 4/2/18 AT 1135 BY LS     Specific Gravity Urine Canceled, Test credited 1.003 - 1.035  51   Comment:         Unsatisfactory specimen - too old for testing  NOTIFIED MATTHEW LONG ON 4/2/18 AT 1135 BY LS     Blood Urine Canceled, Test credited NEG^Negative A 51   Comment:         Unsatisfactory specimen - too old for testing  NOTIFIED MATTHEW LONG ON 4/2/18 AT 1135 BY LS     pH Urine Canceled, Test credited 5.0 - 7.0 pH  51   Comment:         Unsatisfactory specimen - too old for testing  NOTIFIED MATTHEW LONG ON 4/2/18 AT 1135 BY LS     Protein Albumin Urine Canceled, Test credited NEG^Negative mg/dL A 51   Comment:         Unsatisfactory specimen - too old for testing  NOTIFIED MATTHEW LONG ON 4/2/18 AT 1135 BY LS     Urobilinogen mg/dL Canceled, Test credited 0.0 - 2.0 mg/dL  51   Comment:         Unsatisfactory specimen - too old for testing  NOTIFIED MATTHEW LONG ON 4/2/18 AT 1135 BY LS     Nitrite Urine Canceled, Test credited NEG^Negative A 51   Comment:         Unsatisfactory specimen - too old  for testing  NOTIFIED MATTHEW LONG ON 4/2/18 AT 1135 BY LS     Leukocyte Esterase Urine Canceled, Test credited NEG^Negative A 51   Comment:         Unsatisfactory specimen - too old for testing  NOTIFIED MATTHEW LONG ON 4/2/18 AT 1135 BY LS     Source Midstream Urine   51   WBC Urine Canceled, Test credited OTO5^0 - 5 /HPF A 51   Comment:         Unsatisfactory specimen - too old for testing  NOTIFIED MATTHEW LONG ON 4/2/18 AT 1135 BY LS     RBC Urine Canceled, Test credited 0 - 2 /HPF  51   Comment:         Unsatisfactory specimen - too old for testing  NOTIFIED MATTHEW LONG ON 4/2/18 AT 1135 BY LS              Prealbumin [[id number]] (Abnormal)  Resulted: 04/02/18 1103, Result status: Final result    Ordering provider: Aleksandar Howe MD  04/01/18 2330 Resulting lab: [facility]    Specimen Information    Type Source Collected On   Blood  04/02/18 0633          Components       Value Reference Range Flag Lab   Prealbumin 7 15 - 45 mg/dL L 51            CBC with platelets differential [[id number]] (Abnormal)  Resulted: 04/02/18 0807, Result status: Final result    Ordering provider: Aleksandar Howe MD  04/01/18 2330 Resulting lab: [facility]    Specimen Information    Type Source Collected On   Blood  04/02/18 0633          Components       Value Reference Range Flag Lab   WBC 6.3 4.0 - 11.0 10e9/L  51   RBC Count 3.44 3.8 - 5.2 10e12/L L 51   Hemoglobin 10.3 11.7 - 15.7 g/dL L 51   Hematocrit 32.3 35.0 - 47.0 % L 51   MCV 94 78 - 100 fl  51   MCH 29.9 26.5 - 33.0 pg  51   MCHC 31.9 31.5 - 36.5 g/dL  51   RDW 15.1 10.0 - 15.0 % H 51   Platelet Count 274 150 - 450 10e9/L  51   Diff Method Manual Differential   51   % Neutrophils 92.1 %  51   % Lymphocytes 0.9 %  51   % Monocytes 2.6 %  51   % Eosinophils 0.0 %  51   % Basophils 0.0 %  51   % Metamyelocytes 1.8 %  51   % Myelocytes 2.6 %  51   Absolute Neutrophil 5.8 1.6 - 8.3 10e9/L  51   Absolute  Lymphocytes 0.1 0.8 - 5.3 10e9/L L 51   Absolute Monocytes 0.2 0.0 - 1.3 10e9/L  51   Absolute Eosinophils 0.0 0.0 - 0.7 10e9/L  51   Absolute Basophils 0.0 0.0 - 0.2 10e9/L  51   Absolute Metamyelocytes 0.1 0 10e9/L H 51   Absolute Myelocytes 0.2 0 10e9/L H 51   RBC Morphology Normal   51   Platelet Estimate Confirming automated cell count   51            Phosphorus [[id number]]  Resulted: 04/02/18 0740, Result status: Final result    Ordering provider: Rosa Gonzalez APRN CNP  04/01/18 2330 Resulting lab: [facility]    Specimen Information    Type Source Collected On   Blood  04/02/18 0633          Components       Value Reference Range Flag Lab   Phosphorus 2.5 2.5 - 4.5 mg/dL  51            Triglycerides [[id number]] (Abnormal)  Resulted: 04/02/18 0740, Result status: Final result    Ordering provider: Aleksandar Howe MD  04/01/18 2330 Resulting lab: [facility]    Specimen Information    Type Source Collected On   Blood  04/02/18 0633          Components       Value Reference Range Flag Lab   Triglycerides 150 <150 mg/dL H 51   Comment:         Borderline high:  150-199 mg/dl  High:             200-499 mg/dl  Very high:       >499 mg/dl              Comprehensive metabolic panel [[id number]] (Abnormal)  Resulted: 04/02/18 0740, Result status: Final result    Ordering provider: Aleksandar Howe MD  04/01/18 2330 Resulting lab: [facility]    Specimen Information    Type Source Collected On   Blood  04/02/18 0633          Components       Value Reference Range Flag Lab   Sodium 137 133 - 144 mmol/L  51   Potassium 4.4 3.4 - 5.3 mmol/L  51   Chloride 102 94 - 109 mmol/L  51   Carbon Dioxide 31 20 - 32 mmol/L  51   Anion Gap 3 3 - 14 mmol/L  51   Glucose 94 70 - 99 mg/dL  51   Urea Nitrogen 15 7 - 30 mg/dL  51   Creatinine 0.45 0.52 - 1.04 mg/dL L 51   GFR Estimate >90 >60 mL/min/1.7m2  51   Comment:   Non  GFR Calc   GFR Estimate If Black >90 >60 mL/min/1.7m2  51   Comment:  African American GFR Calc   Calcium 7.4 8.5 - 10.1 mg/dL L 51   Bilirubin Total 0.3 0.2 - 1.3 mg/dL  51   Albumin 1.0 3.4 - 5.0 g/dL L 51   Protein Total 4.2 6.8 - 8.8 g/dL L 51   Alkaline Phosphatase 90 40 - 150 U/L  51   ALT 14 0 - 50 U/L  51   AST 19 0 - 45 U/L  51            Magnesium [[id number]]  Resulted: 04/02/18 0740, Result status: Final result    Ordering provider: Aleksandar Howe MD  04/01/18 2330 Resulting lab: [facility]    Specimen Information    Type Source Collected On   Blood  04/02/18 0633          Components       Value Reference Range Flag Lab   Magnesium 2.0 1.6 - 2.3 mg/dL  51            Partial thromboplastin time [[id number]]  Resulted: 04/02/18 0725, Result status: Final result    Ordering provider: Aleksandar Howe MD  04/01/18 2330 Resulting lab: [facility]    Specimen Information    Type Source Collected On   Blood  04/02/18 0633          Components       Value Reference Range Flag Lab   PTT 34 22 - 37 sec  51            INR [[id number]]  Resulted: 04/02/18 0725, Result status: Final result    Ordering provider: Aleksandar Howe MD  04/01/18 2330 Resulting lab: [facility]    Specimen Information    Type Source Collected On   Blood  04/02/18 0633          Components       Value Reference Range Flag Lab   INR 1.11 0.86 - 1.14  51            Glucose by meter [[id number]] (Abnormal)  Resulted: 04/01/18 1824, Result status: Final result    Resulting lab: POINT OF CARE TEST, GLUCOSE     Specimen Information    Type Source Collected On     04/01/18 1804          Components       Value Reference Range Flag Lab   Glucose 109 70 - 99 mg/dL H 170            Glucose by meter [[id number]]  Resulted: 04/01/18 1211, Result status: Final result    Resulting lab: POINT OF CARE TEST, GLUCOSE      Specimen Information    Type Source Collected On     04/01/18 1158          Components       Value Reference Range Flag Lab   Glucose 94 70 - 99 mg/dL  170   Comment:  Patient Treated            CBC with platelets differential [[id number]] (Abnormal)  Resulted: 04/01/18 0647, Result status: Final result    Ordering provider: Mei Ramon PA-C  03/31/18 2193 Resulting lab: [facility]    Specimen Information    Type Source Collected On   Blood  04/01/18 0554          Components       Value Reference Range Flag Lab   WBC 5.4 4.0 - 11.0 10e9/L  51   RBC Count 3.39 3.8 - 5.2 10e12/L L 51   Hemoglobin 10.2 11.7 - 15.7 g/dL L 51   Hematocrit 31.4 35.0 - 47.0 % L 51   MCV 93 78 - 100 fl  51   MCH 30.1 26.5 - 33.0 pg  51   MCHC 32.5 31.5 - 36.5 g/dL  51   RDW 15.1 10.0 - 15.0 % H 51   Platelet Count 241 150 - 450 10e9/L  51   Diff Method Manual Differential   51   % Neutrophils 91.0 %  51   % Lymphocytes 0.9 %  51   % Monocytes 0.9 %  51   % Eosinophils 0.9 %  51   % Basophils 0.9 %  51   % Metamyelocytes 2.7 %  51   % Myelocytes 1.8 %  51   % Promyelocytes 0.9 %  51   Nucleated RBCs 1 0 /100 H 51   Absolute Neutrophil 4.9 1.6 - 8.3 10e9/L  51   Absolute Lymphocytes 0.0 0.8 - 5.3 10e9/L L 51   Absolute Monocytes 0.0 0.0 - 1.3 10e9/L  51   Absolute Eosinophils 0.0 0.0 - 0.7 10e9/L  51   Absolute Basophils 0.0 0.0 - 0.2 10e9/L  51   Absolute Metamyelocytes 0.1 0 10e9/L H 51   Absolute Myelocytes 0.1 0 10e9/L H 51   Absolute Promyeloctyes 0.0 0 10e9/L  51   Absolute Nucleated RBC 0.0   51   RBC Morphology Normal   51   Platelet Estimate Confirming automated cell count   51            Comprehensive metabolic panel [[id number]] (Abnormal)  Resulted: 04/01/18 0643, Result status: Final result    Ordering provider: Mie Ramon PA-C  03/31/18 2330 Resulting lab: [facility]    Specimen Information    Type Source Collected On   Blood  04/01/18 0580           Components       Value Reference Range Flag Lab   Sodium 136 133 - 144 mmol/L  51   Potassium 4.3 3.4 - 5.3 mmol/L  51   Chloride 102 94 - 109 mmol/L  51   Carbon Dioxide 32 20 - 32 mmol/L  51   Anion Gap 2 3 - 14 mmol/L L 51   Glucose 99 70 - 99 mg/dL  51   Urea Nitrogen 12 7 - 30 mg/dL  51   Creatinine 0.40 0.52 - 1.04 mg/dL L 51   GFR Estimate >90 >60 mL/min/1.7m2  51   Comment:  Non  GFR Calc   GFR Estimate If Black >90 >60 mL/min/1.7m2  51   Comment:  African American GFR Calc   Calcium 7.2 8.5 - 10.1 mg/dL L 51   Bilirubin Total 0.3 0.2 - 1.3 mg/dL  51   Albumin 1.0 3.4 - 5.0 g/dL L 51   Protein Total 4.0 6.8 - 8.8 g/dL L 51   Alkaline Phosphatase 90 40 - 150 U/L  51   ALT 14 0 - 50 U/L  51   AST 17 0 - 45 U/L  51            Phosphorus [[id number]]  Resulted: 04/01/18 0643, Result status: Final result    Ordering provider: Rosa Gonzalez APRN CNP  03/31/18 2330 Resulting lab: [facility]    Specimen Information    Type Source Collected On   Blood  04/01/18 0554          Components       Value Reference Range Flag Lab   Phosphorus 2.5 2.5 - 4.5 mg/dL  51            Magnesium [[id number]]  Resulted: 04/01/18 0643, Result status: Final result    Ordering provider: Aleksandar Howe MD  03/31/18 2330 Resulting lab: [facility]    Specimen Information    Type Source Collected On   Blood  04/01/18 0554          Components       Value Reference Range Flag Lab   Magnesium 2.0 1.6 - 2.3 mg/dL  51            INR [[id number]]  Resulted: 04/01/18 0629, Result status: Final result    Ordering provider: Mei Ramon PA-C  03/31/18 2330 Resulting lab: [facility]    Specimen Information    Type Source Collected On   Blood  04/01/18 0554          Components       Value Reference Range Flag Lab   INR 1.11 0.86 - 1.14  51            Partial thromboplastin time [[id number]]  Resulted: 04/01/18 0629, Result  status: Final result    Ordering provider: Aleksandar Howe MD  03/31/18 2330 Resulting lab: [facility]    Specimen Information    Type Source Collected On   Blood  04/01/18 0554          Components       Value Reference Range Flag Lab   PTT 34 22 - 37 sec  51            Testing Performed By     Lab - Abbreviation Name Director Address Valid Date Range    51 - Unknown [facility] Unknown [address] [facility] [address] 12/31/14 1010 - Present    75 - Unknown [facility] Unknown [address] [facility] [address] 01/15/15 1019 - Present    170 - Unknown POINT OF CARE TEST, GLUCOSE Unknown Unknown 10/31/11 1114 - Present    225 - Unknown [facility] Unknown [address] [facility] [address] 12/19/14 0954 - Present            Unresulted Labs (24h ago through future)    Start       Ordered    04/04/18 0000  CBC with platelets differential  Routine     Comments:  Last Lab Result: Hemoglobin (g/dL)       Date                     Value                 04/04/2018               9.7 (L)          ----------    04/04/18 1156    04/04/18 0000  **Comprehensive metabolic panel FUTURE anytime  Routine      04/04/18 1156    04/03/18 0530  Magnesium  AM DRAW,   Routine      04/02/18 1439    04/02/18 0530  Triglycerides  (Every Monday)  EVERY MONDAY,   Routine      04/01/18 1716    04/02/18 0530  Prealbumin  (Every Monday)  EVERY MONDAY,   Routine      04/01/18 1716    04/02/18 0530  INR  (INR and PTT Panel)  DAILY,   Routine      04/01/18 1716    04/02/18 0530  Comprehensive metabolic panel  DAILY,   Routine      04/01/18 1716 04/02/18 0530  CBC with platelets differential  DAILY,   Routine     Comments:  Last Lab Result: Hemoglobin (g/dL)       Date                     Value                 04/01/2018               10.2 (L)         ----------    04/01/18 1716 03/28/18 0530  Phosphorus  " AM DRAW,   Routine      03/27/18 1057    Unscheduled  Blood culture  (Blood Culture - 2 Sites)  CONDITIONAL (SPECIFY),   Routine     Comments:  Site #1:  If temp is greater than 100.4    May repeat max of once per 24 hrs. (Stat Lab Collect with 1st site collected from Venipuncture and 2nd site from VAD by RN,  if no VAD then 2 peripheral sticks-2 venipuncture from different sites)    03/19/18 1551    Unscheduled  Blood culture  (Blood Culture - 2 Sites)  CONDITIONAL (SPECIFY),   Routine     Comments:  SITE #2: If temp is greater than 100.4    May repeat max of once per 24 hrs. (Stat Lab Collect with 1st site collected from Venipuncture and 2nd site from VAD by RN,  if no VAD then 2 peripheral sticks-2 venipuncture from different sites)    03/19/18 1551    Unscheduled  Potassium  (Potassium Replacement - \"High\" - Replacement for all levels less than 4.1 mmol/L - UU,UR,UA,RH,SH,PH,WY )  CONDITIONAL (SPECIFY),   Routine     Comments:  Obtain Potassium Level for these conditions:  *IF no potassium result within 24 hrs before initiation of order set, draw potassium level with next lab collect.    *2 HOURS AFTER last IV potassium replacement dose and 4 hours after an oral replacement dose when potassium replacement given for level less than 3.4.  *Next morning after potassium dose.     Repeat Potassium Replacement if necessary.    03/20/18 0747    Unscheduled  Magnesium  (Magnesium Replacement - Adult - \"High\" - Replacement for all levels less than or equal to 2 mg/dL)  CONDITIONAL (SPECIFY),   Routine     Comments:  Obtain Magnesium Level for these conditions:  *IF no magnesium result within 24 hrs before initiation of order set, draw magnesium level with next lab collect.    *2 HOURS AFTER last magnesium replacement dose when magnesium replacement given for level less than 1.6  *Next morning after magnesium dose.     Repeat Magnesium Replacement if necessary.    03/20/18 0747    Unscheduled  Phosphorus  (POTASSIUM " "Phosphate - \"High\" - Replacement for all levels less than 2.8 mg/dL )  CONDITIONAL (SPECIFY),   Routine     Comments:  Obtain Phosphorus Level for these conditions:  *IF no phosphorus result within 24 hrs before initiation of order set, draw phosphorus level with next lab collect.    *2 HOURS AFTER last phosphorus replacement dose when phosphorus replacement given for level less than 2.0  *Next morning after phosphorus dose.     Repeat Phosphorus Replacement if necessary.    03/20/18 0747         Imaging Results - 3 Days      XR Abdomen Port 2 Views [[id number]]  Resulted: 04/02/18 1651, Result status: Final result    Ordering provider: Brenda Gurrola PA-C  04/02/18 0937 Resulted by: Emily Ruvalcaba MD Larson, Earl, DO    Performed: 04/02/18 1214 - 04/02/18 1417 Resulting lab: RADIOLOGY RESULTS    Narrative:       Exam: XR ABDOMEN PORT 2 VW, 4/2/2018 2:17 PM    Indication: Increasing abdominal distension with previous SBO. Eval  for status of SBO.;     Comparison: CT of the chest abdomen and pelvis dated 3/29/2018,  abdominal radiographs dated 3/27/2018.    Findings:   Supine AP views of the abdomen. Multiple gas distended loops of  predominantly small bowel. No significant change compared to CT dated  3/28/2018. No pneumatosis or portal venous gas. Bilateral pleural  effusions and pulmonary opacities are better seen on same-day chest  radiograph. Degenerative changes of the lumbar spine.       Impression:       Impression:   1. No significant change in small bowel distention compared to CT  dated 3/28/2018.  2. Bilateral pleural effusions and bibasilar opacities, which are  better seen on same-day chest radiograph.    I have personally reviewed the examination and initial interpretation  and I agree with the findings.    EMILY RUVALCABA MD      XR Chest Port 1 View [[id number]]  Resulted: 04/02/18 1630, Result status: Final result    Ordering provider: Brenda Gurrola PA-C  04/02/18 1323 Resulted by: Brad, " Carlitos Shah MD  [facility]    Performed: 04/02/18 1337 - 04/02/18 1416 Resulting lab: RADIOLOGY RESULTS    Narrative:       Exam:  XR CHEST PORT 1 VW, 4/2/2018 4:07 PM    History: Diminished lung sounds. Eval for fluid vs atelectasis vs  infection.;     Comparison:  Chest radiograph 3/29/2018.    Findings:  Supine view of the chest. Right chest wall portacatheter  tip projects over the mid right atrium. Cardiac silhouette is  obscured. Increased bilateral layering pleural effusions. No  significant change in bilateral lower lobe opacities. No pneumothorax.  Biapical scarring. Visualized upper abdomen is unremarkable. Severe  degenerative change of both glenohumeral joints. Old left humeral neck  fracture.      Impression:       Impression:    Increased bilateral layering pleural effusions without significant  change in bibasilar atelectasis and/or consolidation    I have personally reviewed the examination and initial interpretation  and I agree with the findings.    CARLITOS ASHFORD MD      Testing Performed By     Lab - Abbreviation Name Director Address Valid Date Range    104 - Rad R[facility]ts RADIOLOGY RESULTS Unknown Unknown 02/16/05 1553 - Present            Encounter-Level Documents:     There are no encounter-level documents.      Order-Level Documents:     There are no order-level documents.

## 2018-03-19 NOTE — PROGRESS NOTES
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 Taylor presents to clinic today for her weekly on-treatment visit. She was started on tincture of opium last week and given IV fluids due to medically-intractable treatment-related diarrhea and dehydration. Her diarrhea appears to have been slightly improved although she is currently having up to 5-6 bowel movements per day. She additionally has developed bilateral lower extremity dependent edema and her weight is recorded as up approximately 9 kg since her previous visit however most of this is likely artifactual as she was weighed today prior to radiotherapy while wearing a coat, 2 sweaters and a shirt.     Ms. Taylor additionally describes increased confusion and unsteadiness/dizziness over the past 3-4 days and she has fallen twice over the past weekend and was evaluated in the ED on 3/17/2018. Her symptoms improved with fluid resuscitation while in the ED and she was discharged home following a negative workup. This morning, though, Ms. Taylor suffered another fall while at home, landing on her back and right hip without any reported trauma to the head, seizures or loss of consciousness.     ROS:   Nutrition Alteration  Diet Type: Patient's Preference  Nutrition Note: Eating mostly liquids such as soups    Skin  Skin Reaction: Brisk erythema involving the perineum and bilateral inguinal regions      ENT and Mouth Exam  Mucositis - Current: Generalized  erythema     Gastrointestinal  Nausea: None  Diarrhea: Three to five soft or liquid bowel movements per day      Psychosocial  Pyschosocial Note: Mild confusion    Pain Assessment  0-10 Pain Scale: 1    Objective:   Weight: 59.3 kg  BP: 111/77 (sitting), 106/71 (standing)  Pulse: 82 (sitting), 82 (standing)    General: Moderately fatigued-appearing. Patient is confused at times and tangential when answering questions.  Cardiac: Irregular rhythm. No murmurs/rubs/gallops. Extremities are well-perfused. Moderate pitting erythema of the bilateral lower extremities.  Respiratory: Lung fields are clear to auscultation bilaterally with no wheezing, stridor or respiratory distress  Skin: Moderate erythema involving the entirety of the perineum and bilateral inguinal regions (left>right). Moderate edema of the bilateral labia majora which is slightly increased from her previous examination on 3/15/2018.    Treatment-related toxicities (CTCAE v4.0):  1. Fatigue: Grade 1: Fatigue relieved by rest  2. Nausea: Grade 1: Loss of appetite without alteration in eating habits  3. Diarrhea: Grade 3: Increase of >=7 stools per day over baseline; incontinence; hospitalization indicated; severe increase in ostomy output compared to baseline; limiting self-care ADL  4. Dermatitis: Grade 2: Moderate to brisk erythema; patchy moist desquamation, mostly confined to skin folds and creases; moderate erythema    Assessment/Plan:    Ms. 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 She is currently undergoing definitive chemoradiotherapy with curative intent. Her course has been complicated by the development of grade 3 diarrhea and dehydration. Her functional status has additionally significantly declined over the past week which is likely multifactorial related to her treatment-related toxicities and polypharmacy.    I discussed Ms. Taylor's case with Dr. Ray in medical oncology and, given the  concern for her well-being and inability to manage her care at home due to her declining functional status and lack of home support, the decision was made to request inpatient admission for additional medical assessment and optimization. I discussed Ms. Taylor's case with the primary medical oncology inpatient service and will plan to have her evaluated in the ED this afternoon followed by inpatient admission.  In the interim, we will continue with radiation therapy as planned for the duration of her treatment.    Mosaiq chart and setup information reviewed  MVCT images reviewed    Medication Review  Med list reviewed with patient?: Yes    Zaire Madison MD/PhD  Department of Radiation Oncology  [facility]

## 2018-03-19 NOTE — ED NOTES
[facility], [place]   ED Nurse to Floor Handoff     Elizabeth Taylor is a 77 year old female who speaks English and lives alone,  in a home  They arrived in the ED by car from home    ED Chief Complaint: Fall    ED Dx;   Final diagnoses:   Dehydration         Needed?: No    Allergies:   Allergies   Allergen Reactions     Darvon [Propoxyphene]      Had reaction in teen years     Penicillins      As a child     Ketoconazole Rash   .  Past Medical Hx:   Past Medical History:   Diagnosis Date     Anal cancer (H)      Endometriosis, site unspecified      Thyroiditis, unspecified     Thryoiditis-resolved      Baseline Mental status: WDL  Current Mental Status changes: {Current Mental Status Changes:854341    Infection present or suspected this encounter: no  Sepsis suspected: No  Isolation type: Enteric     Activity level - Baseline/Home:  Independent  Activity Level - Current:   Stand with Assist    Bariatric equipment needed?: No    In the ED these meds were given:   Medications   lidocaine 1 % 1 mL (not administered)   lidocaine (LMX4) kit (not administered)   sodium chloride (PF) 0.9% PF flush 3 mL (not administered)   sodium chloride (PF) 0.9% PF flush 3 mL (not administered)   0.9% sodium chloride BOLUS (1,000 mLs Intravenous New Bag 3/19/18 1418)     Followed by   sodium chloride 0.9% infusion (not administered)       Drips running?  No    Home pump  No    Current LDAs  Port A Cath Single 02/08/18 Right Chest wall (Active)   Access Date 03/19/18 3/19/2018  1:58 PM   Access Attempts 1 3/19/2018  1:58 PM   Gauge/Length Power noncoring 90 degree bend;20 gauge;3/4 inch 3/19/2018  1:58 PM   Site Assessment WDL 3/19/2018  1:58 PM   Line Status Blood return noted;Saline locked 3/19/2018  1:58 PM   Dressing Intervention Transparent 3/19/2018  1:58 PM   Dressing change due 03/26/18 3/19/2018  1:58 PM   Number of days:39       Incision/Surgical Site 02/08/18 Right Chest (Active)  "  Number of days:39       Labs results:   Labs Ordered and Resulted from Time of ED Arrival Up to the Time of Departure from the ED   LACTIC ACID WHOLE BLOOD - Abnormal; Notable for the following:        Result Value    Lactic Acid 0.6 (*)     All other components within normal limits   CBC WITH PLATELETS DIFFERENTIAL   INR   PARTIAL THROMBOPLASTIN TIME   COMPREHENSIVE METABOLIC PANEL   LIPASE   TROPONIN I   ROUTINE UA WITH MICROSCOPIC REFLEX TO CULTURE   CARDIAC CONTINUOUS MONITORING   PULSE OXIMETRY NURSING   PERIPHERAL IV CATHETER   ORTHOSTATIC BLOOD PRESSURE AND PULSE   ENTERIC BACTERIA AND VIRUS PANEL BY JACQUELIN STOOL   CLOSTRIDIUM DIFFICILE TOXIN B       Imaging Studies: No results found for this or any previous visit (from the past 24 hour(s)).    Recent vital signs:   /45  Pulse 80  Temp 95.4  F (35.2  C) (Oral)  Resp 18  Ht 1.651 m (5' 5\")  SpO2 100%  BMI 21.77 kg/m2    Cardiac Rhythm: Normal Sinus  Pt needs tele? No  Skin/wound Issues: thin skin, bruising on chest and back    Code Status: Full Code    Pain control: good    Nausea control: good    Abnormal labs/tests/findings requiring intervention: dehydraton    Family present during ED course? No   Family Comments/Social Situation comments: n/a    Tasks needing completion: need stool sample    Lani Gale RN  [facility]  [phone] [place] ED  [phone] [facility] ED      "

## 2018-03-19 NOTE — IP AVS SNAPSHOT
` `     [address] [facility] BANK: [phone]            Medication Administration Report for Elizabeth Taylor as of 04/04/18 1452   Legend:    Given Hold Not Given Due Canceled Entry Other Actions    Time Time (Time) Time  Time-Action       Inactive    Active    Linked        Medications 03/29/18 03/30/18 03/31/18 04/01/18 04/02/18 04/03/18 04/04/18    acetaminophen (TYLENOL) tablet 650 mg  Dose: 650 mg  Freq: EVERY 4 HOURS PRN Route: PO  PRN Reasons: mild pain,fever  Start: 03/19/18 1551   Admin Instructions: Maximum acetaminophen dose from all sources = 75 mg/kg/day not to exceed 4 grams/day.     0800 (650 mg)-Given       1648 (650 mg)-Given        0242 (650 mg)-Given       0702 (650 mg)-Given       1953 (650 mg)-Given        0146 (650 mg)-Given       0830 (650 mg)-Given       1212 (650 mg)-Given       1710 (650 mg)-Given       2132 (650 mg)-Given        0409 (650 mg)-Given       1006 (650 mg)-Given       1558 (650 mg)-Given       2204 (650 mg)-Given        0335 (650 mg)-Given       0759 (650 mg)-Given             artificial saliva (BIOTENE DRY MOUTHWASH) liquid 5-10 mL  Dose: 5-10 mL  Freq: 4 TIMES DAILY PRN Route: SWISH & SPIT  PRN Reason: dry mouth  Start: 03/24/18 0908              Benzocaine (HURRICAINE/TOPEX) 20 % spray 0.5-1 mL  Dose: 1-2 spray  Freq: EVERY 3 HOURS PRN Route: MT  PRN Reason: moderate pain  Start: 03/27/18 1236              calcium-vitamin D (CALTRATE) 600-400 MG-UNIT per tablet 1 tablet  Dose: 1 tablet  Freq: DAILY Route: PO  Start: 03/26/18 1200    1755 (1 tablet)-Given        (1743)-Not Given        (1710)-Not Given        (1800)-Not Given        1836 (1 tablet)-Given        1728 (1 tablet)-Given        [ ] 1800           dextrose 10 % 1,000 mL infusion  Freq: CONTINUOUS PRN Route: IV  PRN Comment:    Start: 03/29/18 1450   Admin Instructions: For Hypoglycemia Prevention for patients on long-acting subcutaneous basal insulin (Glargine, Detemir, NPH) or continuous insulin infusion.  Whenever nutrition support is held or interrupted:  1) Infuse IV D10W at nutrition support rate   2) Notify provider for further instructions               diclofenac (VOLTAREN) 1 % topical gel 2 g  Dose: 2 g  Freq: 4 TIMES DAILY PRN Route: TD  PRN Reason: moderate pain  Start: 03/20/18 1300   Admin Instructions: Apply to shoulder, ribs  Send dosing card with product.               diphenoxylate-atropine (LOMOTIL) 2.5-0.025 MG per tablet 1 tablet  Dose: 1 tablet  Freq: 4 TIMES DAILY PRN Route: PO  PRN Reason: diarrhea  Start: 04/04/18 1121              enoxaparin (LOVENOX) injection 40 mg  Dose: 40 mg  Freq: EVERY 24 HOURS Route: SC  Start: 03/19/18 2000   Admin Instructions: HOLD if platelet count falls below 50% of baseline or less than 50,000/ L and notify provider. Hold for 12 hours prior to epidural catheter removal or prior to invasive procedure.     2105 (40 mg)-Given        1948 (40 mg)-Given        2020 (40 mg)-Given        2004 (40 mg)-Given        2029 (40 mg)-Given        2001 (40 mg)-Given        [ ] 2000           heparin 100 UNIT/ML injection 5 mL  Dose: 5 mL  Freq: EVERY 28 DAYS Route: IK  Start: 03/27/18 1015   Admin Instructions: ONLY to de-access each port in dual implanted port.  Flush with 10 mL NS followed by 5 mL heparin (100 units/mL) at discharge and at least every 28 days.  MAX: 5 mL per port               heparin lock flush 10 UNIT/ML injection 5-10 mL  Dose: 5-10 mL  Freq: EVERY 1 HOUR PRN Route: IK  PRN Reason: other  PRN Comment: to lock each port in dual implanted port.  Start: 03/27/18 1004   Admin Instructions: MAX: 5 mL per port.     0753 (5 mL)-Given             1432 (5 mL)-Given           heparin lock flush 10 UNIT/ML injection 5-10 mL  Dose: 5-10 mL  Freq: EVERY 24 HOURS Route: IK  Start: 03/27/18 1015   Admin Instructions: To lock each dormant port in dual implanted port.  Check PRN heparin flush order to see when last dose of PRN heparin was given before administering.   MAX: 5  mL per port.     (0600)-Not Given        (0600)-Not Given        (0600)-Not Given                        (0526)-Not Given        (0612)-Not Given           hydrocortisone (ANUSOL-HC) 2.5 % cream  Freq: 2 TIMES DAILY Route: RE  Start: 03/19/18 2000    0757 ( )-Given       2106 ( )-Given        1013 ( )-Given       1949 ( )-Given        (1018)-Not Given       2020 ( )-Given        0920 ( )-Given       2004 ( )-Given        0759 ( )-Given       2032 ( )-Given        1051 ( )-Given       2001 ( )-Given        0811 ( )-Given       [ ] 2000           ketamine (KETALAR) 5%-gabapentin (NEURONTIN) 8%-lidocaine 2.5% topical PLO cream  Freq: 4 TIMES DAILY PRN Route: Top  PRN Comment: pain  Start: 03/20/18 1300   Admin Instructions: Apply to shoulder, ribs               lidocaine-prilocaine (EMLA) cream  Freq: EVERY 2 HOURS PRN Route: Top  PRN Reason: moderate pain  Start: 03/19/18 1551   Admin Instructions: Apply to rectum               lipids (INTRALIPID) 20 % infusion 250 mL  Dose: 250 mL  Freq: Once per day on Mon Tue Wed Thu Fri Route: IV  Last Dose: 250 mL (04/03/18 2259)  Start: 03/29/18 2000   Admin Instructions: Administer through a 1.2 micron filter  Requires container change every 12 hours and tubing change every 24 hours.     2105 (250 mL)-New Bag        0050 (250 mL)-Rate/Dose Verify       1947 (250 mL)-New Bag          2032 (250 mL)-New Bag        1959 (250 mL)-New Bag       2259 (250 mL)-Rate/Dose Verify        [ ] 2000           loperamide (IMODIUM) capsule 2 mg  Dose: 2 mg  Freq: 4 TIMES DAILY PRN Route: PO  PRN Reason: diarrhea  Start: 04/03/18 1454              magic mouthwash suspension (diphenhydramine, lidocaine, aluminum-magnesium & simethicone)  Dose: 5-10 mL  Freq: 4 TIMES DAILY PRN Route: SWISH & SPIT  PRN Comment: Swish and spit 5-10 mL four times daily as needed for mouth sores  Start: 03/19/18 1551    1053 (10 mL)-Given        1015 (5 mL)-Given       2158 (5 mL)-Given        1009 (5 mL)-Given        2038 (5 mL)-Given        0423 (5 mL)-Given       2007 (5 mL)-Given        1724 (10 mL)-Given        0414 (5 mL)-Given       1733 (5 mL)-Given        0445 (10 mL)-Given       0759 (10 mL)-Given           magnesium sulfate 2 g in NS intermittent infusion (PharMEDium or FV Cmpd)  Dose: 2 g  Freq: DAILY PRN Route: IV  PRN Reason: magnesium supplementation  Last Dose: 2 g (04/01/18 0755)  Start: 03/20/18 0747   Admin Instructions: For Serum Mg++ 1.6 - 2 mg/dL  Give 2 g and recheck magnesium level next AM.      1014 (2 g)-New Bag        0752 (2 g)-New Bag        0755 (2 g)-New Bag              magnesium sulfate 4 g in 100 mL sterile water (premade)  Dose: 4 g  Freq: EVERY 4 HOURS PRN Route: IV  PRN Reason: magnesium supplementation  Start: 03/20/18 0747   Admin Instructions: For serum Mg++ less than 1.6 mg/dL  Give 4 g and recheck magnesium level 2 hours after dose, and next AM.               Medication Instruction  Freq: CONTINUOUS PRN Route: XX  Start: 03/19/18 1551   Admin Instructions: No rectal suppositories if WBC less than 1000/microliter or platelets less than 50,000/microliter.         (1605)-Not Given [C]             methocarbamol (ROBAXIN) tablet 500 mg  Dose: 500 mg  Freq: 4 TIMES DAILY PRN Route: PO  PRN Reason: muscle spasms  Start: 03/24/18 0815              multivitamin, therapeutic (THERA-VIT) tablet 1 tablet  Dose: 1 tablet  Freq: DAILY Route: PO  Start: 03/24/18 0815    (1859)-Not Given        (1744)-Not Given        (1713)-Not Given        (1801)-Not Given        (1726)-Not Given        (1728)-Not Given        [ ] 1800           naloxone (NARCAN) injection 0.1-0.4 mg  Dose: 0.1-0.4 mg  Freq: EVERY 2 MIN PRN Route: IV  PRN Reason: opioid reversal  Start: 03/21/18 1330   Admin Instructions: For respiratory rate LESS than or EQUAL to 8.  Partial reversal dose:  0.1 mg titrated q 2 minutes for Analgesia Side Effects Monitoring Sedation Level of 3 (frequently drowsy, arousable, drifts to sleep during  conversation).Full reversal dose:  0.4 mg bolus for Analgesia Side Effects Monitoring Sedation Level of 4 (somnolent, minimal or no response to stimulation).  For ordered doses up to 2mg give IVP. Give each 0.4mg over 15 seconds in emergency situations. For non-emergent situations further dilute in 9mL of NS to facilitate titration of response.               ondansetron (ZOFRAN-ODT) ODT tab 8 mg  Dose: 8 mg  Freq: EVERY 8 HOURS PRN Route: PO  PRN Reasons: nausea,vomiting  Start: 03/20/18 0831   Admin Instructions: This is Step 1 of nausea and vomiting management.  If nausea not resolved in 15 minutes, go to Step 2 prochlorperazine (COMPAZINE). Do not push through foil backing. Peel back foil and gently remove. Place on tongue immediately. Administration with liquid unnecessary              Or  ondansetron (ZOFRAN) injection 8 mg  Dose: 8 mg  Freq: EVERY 8 HOURS PRN Route: IV  PRN Reasons: nausea,vomiting  Start: 03/20/18 0831   Admin Instructions: This is Step 1 of nausea and vomiting management.  If nausea not resolved in 15 minutes, go to Step 2 prochlorperazine (COMPAZINE).  Irritant. For ordered doses up to 4 mg, give IV Push undiluted over 2-5 minutes.               parenteral nutrition - ADULT compounded formula  Rate: 50 mL/hr   Freq: TPN CONTINUOUS Route: CENTRAL LINE  Start: 04/03/18 2000   End: 04/04/18 1959   Admin Instructions: Infuse using a 0.22 micron filter.  Nurse Instructions: When TPN is discontinued, decrease rate to   of current rate for 1 hour. May continue at   rate until bag runs out or for 24h.            2001 ( )-New Bag       2259 ( )-Rate/Dose Verify        1959-Med Discontinued         Rate: 40 mL/hr   Freq: TPN CONTINUOUS Route: CENTRAL LINE  Start: 04/02/18 2000   End: 04/03/18 1959   Admin Instructions: Infuse using a 0.22 micron filter.  Nurse Instructions: When TPN is discontinued, decrease rate to   of current rate for 1 hour. May continue at   rate until bag runs out or for 24h.            2032 ( )-New Bag        1959-Med Discontinued        potassium chloride (KLOR-CON) Packet 20-40 mEq  Dose: 20-40 mEq  Freq: EVERY 2 HOURS PRN Route: ORAL OR FEED  PRN Reason: potassium supplementation  Start: 03/20/18 0747   Admin Instructions: Use if unable to tolerate tablets.    If Serum K+ 3.4-4.0, dose = 20 mEq x1. Recheck K+ level the next AM.  If Serum K+ 3.0-3.3, dose = 60 mEq po total dose (40 mEq x 1 followed in 2 hours by 20 mEq X1). Recheck K+ level 4 hours after dose and the next AM.  If Serum K+ 2.5-2.9, dose = 80 mEq po total dose (40 mEq Q2H x2). Recheck K+ level 4 hours after dose and the next AM.  If Serum K+ less than 2.5, See IV order.  Dissolve packet contents in 4-8 ounces of cold water or juice.               potassium chloride 10 mEq in 100 mL intermittent infusion with 10 mg lidocaine  Dose: 10 mEq  Freq: EVERY 1 HOUR PRN Route: IV  PRN Reason: potassium supplementation  Last Dose: 10 mEq (03/30/18 1424)  Start: 03/20/18 0747   Admin Instructions: Infuse via PERIPHERAL LINE. Use potassium with lidocaine for pain with peripheral administration.  If Serum K+ 3.4-4.0, dose = 10 mEq/hr x2 doses. Recheck K+ level the next AM.  If Serum K+ 3.0-3.3, dose = 10 mEq/hr x4 doses (40 mEq IV total dose). Recheck K+ level 2 hours after dose and the next AM.  If Serum K+ less than 3.0, dose = 10 mEq/hr x6 doses (60 mEq IV total dose). Recheck K+ level 2 hours after dose and the next AM.      1245 (10 mEq)-New Bag       1424 (10 mEq)-New Bag                potassium chloride 10 mEq in 100 mL sterile water intermittent infusion (premix)  Dose: 10 mEq  Freq: EVERY 1 HOUR PRN Route: IV  PRN Reason: potassium supplementation  Last Dose: 10 mEq (03/31/18 1121)  Start: 03/20/18 0747   Admin Instructions: Infuse via PERIPHERAL LINE or CENTRAL LINE. Use for central line replacement if patient weight less than 65 kg, if patient is on TPN with high potassium content or if unit does not stock 20 mEq bags.  If  Serum K+ 3.4-4.0, dose = 10 mEq/hr x2 doses. Recheck K+ level the next AM.  If Serum K+ 3.0-3.3, dose = 10 mEq/hr x4 doses (40 mEq IV total dose). Recheck K+ level 2 hours after dose and the next AM.  If Serum K+ less than 3.0, dose = 10 mEq/hr x6 doses (60 mEq IV total dose). Recheck K+ level 2 hours after dose and the next AM.       1010 (10 mEq)-New Bag       1121 (10 mEq)-New Bag               potassium chloride 20 mEq in 50 mL intermittent infusion  Dose: 20 mEq  Freq: EVERY 1 HOUR PRN Route: IV  PRN Reason: potassium supplementation  Last Dose: 20 mEq (03/29/18 1922)  Start: 03/20/18 0747   Admin Instructions: Infuse via CENTRAL LINE Only.  May need EKG if less than 65 kg or on TPN - Max rate is 0.3 mEq/kg/hr for patients not on EKG monitoring.    If Serum K+ 3.4-4.0, dose = 20 mEq/hr x1 doses. Recheck K+ level the next AM.  If Serum K+ 3.0-3.3, dose = 20 mEq/hr x2 doses (40 mEq IV total dose).  Recheck K+ level 2 hours after dose and the next AM.  If Serum K+ less than 3.0, dose = 20 mEq/hr x3 doses (60 mEq IV total dose). Recheck K+ level 2 hours after dose and the next AM.     1922 (20 mEq)-New Bag                 potassium chloride SA (K-DUR/KLOR-CON M) CR tablet 20-40 mEq  Dose: 20-40 mEq  Freq: EVERY 2 HOURS PRN Route: PO  PRN Reason: potassium supplementation  Start: 03/20/18 0747   Admin Instructions: Use if able to take PO.   If Serum K+ 3.4-4.0, dose = 20 mEq x1. Recheck K+ level the next AM.  If Serum K+ 3.0-3.3, dose = 60 mEq po total dose (40 mEq x1 followed in 2 hours by 20 mEq x1). Recheck K+ level 4 hours after dose and the next AM.  If Serum K+ 2.5-2.9, dose = 80 mEq po total dose (40 mEq Q2H x2). Recheck K+ level 4 hours after dose and the next AM.  If Serum K+ less than 2.5, See IV order.  DO NOT CRUSH.               potassium phosphate 10 mmol in sodium chloride 0.9 % 250 mL intermittent infusion  Dose: 10 mmol  Freq: DAILY PRN Route: IV  PRN Reason: phosphorous supplementation  Start:  03/20/18 0747   Admin Instructions: For serum phosphorus level 2.5-2.7  Do not infuse Phosphorus in the same line as TPN.   Give 10 mmol and recheck phosphorus level the next AM.        1007 (10 mmol)-New Bag        1307 (10 mmol)-New Bag        1239 (10 mmol)-New Bag        0756 (10 mmol)-New Bag           potassium phosphate 15 mmol in sodium chloride 0.9 % 250 mL intermittent infusion  Dose: 15 mmol  Freq: DAILY PRN Route: IV  PRN Reason: phosphorous supplementation  Start: 03/20/18 0747   Admin Instructions: For serum phosphorus level 2.0-2.4  Do not infuse Phosphorus in the same line as TPN.   Give 15 mmol and recheck phosphorus level next AM.      1550 (15 mmol)-New Bag        1236 (15 mmol)-New Bag               potassium phosphate 20 mmol in sodium chloride 0.9 % 250 mL intermittent infusion  Dose: 20 mmol  Freq: EVERY 6 HOURS PRN Route: IV  PRN Reason: phosphorous supplementation  Start: 03/20/18 0747   Admin Instructions: For serum phosphorus level 1.1-1.9  For CENTRAL Line ONLY  Do not infuse Phosphorus in the same line as TPN.   Give 20 mmol and recheck phosphorus level 2 hours after dose and next AM.               potassium phosphate 20 mmol in sodium chloride 0.9 % 500 mL intermittent infusion  Dose: 20 mmol  Freq: EVERY 6 HOURS PRN Route: IV  PRN Reason: phosphorous supplementation  Start: 03/20/18 0747   Admin Instructions: For serum phosphorus level 1.1-1.9  For peripheral line  Do not infuse Phosphorus in the same line as TPN.   Give 20 mmol and recheck phosphorus level 2 hours after dose and next AM. Repeat if necessary.               potassium phosphate 25 mmol in sodium chloride 0.9 % 500 mL intermittent infusion  Dose: 25 mmol  Freq: EVERY 8 HOURS PRN Route: IV  PRN Reason: phosphorous supplementation  Start: 03/20/18 0747   Admin Instructions: For serum phosphorus level less than 1.1  Do not infuse Phosphorus in the same line as TPN.   Give 25 mmol and recheck phosphorus level 2 hours after  dose and next AM.               prochlorperazine (COMPAZINE) injection 5-10 mg  Dose: 5-10 mg  Freq: EVERY 6 HOURS PRN Route: IV  PRN Reasons: nausea,vomiting  Start: 03/20/18 0831   Admin Instructions: This is Step 2 of nausea and vomiting management. Give if nausea not resolved 15 minutes after giving ondansetron (ZOFRAN). If nausea not resolved in 15 minutes, go to Step 3 metoclopramide (REGLAN), if ordered.  For ordered doses up to 10 mg, give IV Push undiluted. Each 5mg over 1 minute.              Or  prochlorperazine (COMPAZINE) tablet 5-10 mg  Dose: 5-10 mg  Freq: EVERY 6 HOURS PRN Route: PO  PRN Reason: vomiting  Start: 03/20/18 0831   Admin Instructions: This is Step 2 of nausea and vomiting management. Give if nausea not resolved 15 minutes after giving ondansetron (ZOFRAN). If nausea not resolved in 15 minutes, go to Step 3 metoclopramide (REGLAN), if ordered.               simethicone (MYLICON) suspension 40 mg  Dose: 40 mg  Freq: EVERY 6 HOURS PRN Route: PO  PRN Reason: cramping  Start: 04/03/18 1046              sodium chloride (PF) 0.9% PF flush 10-20 mL  Dose: 10-20 mL  Freq: EVERY 1 HOUR PRN Route: IK  PRN Reasons: line flush,post meds or blood draw  Start: 03/27/18 1004   Admin Instructions: And Daily PRN, to de-access each port in dual implanted port.  Flush with 10 mL NS followed by 5 mL heparin (100 units/mL) at discharge and at least every 28 days.     0624 (20 mL)-Given [C]          0550 (20 mL)-Given        0627 (20 mL)-Given         0640 (20 mL)-Given           sodium chloride (PF) 0.9% PF flush 10-20 mL  Dose: 10-20 mL  Freq: EVERY 1 HOUR PRN Route: IK  PRN Reasons: line flush,post meds or blood draw  PRN Comment: To flush each CVC Implanted port.  Start: 03/27/18 1004   Admin Instructions: 10 mL post IV meds;   20 mL post blood draw.     0753 (10 mL)-Given                 traMADol (ULTRAM) tablet 50 mg  Dose: 50 mg  Freq: EVERY 6 HOURS Route: PO  Start: 04/04/18 7305 9165 (58  mg)-Given       [ ] 2015           vitamin B complex with vitamin C (STRESS TAB) tablet 1 tablet  Dose: 1 tablet  Freq: DAILY Route: PO  Start: 03/19/18 1600    (1859)-Not Given        (1744)-Not Given        (1713)-Not Given        (1801)-Not Given        (1837)-Not Given        (1729)-Not Given        [ ] 1800          Future Medications  Medications 03/29/18 03/30/18 03/31/18 04/01/18 04/02/18 04/03/18 04/04/18       parenteral nutrition - ADULT compounded formula  Rate: 50 mL/hr   Freq: TPN CONTINUOUS Route: CENTRAL LINE  Start: 04/04/18 2000   End: 04/05/18 1959   Admin Instructions: Infuse using a 0.22 micron filter.  Nurse Instructions: When TPN is discontinued, decrease rate to   of current rate for 1 hour. May continue at   rate until bag runs out or for 24h.             [ ] 2000          Discontinued Medications  Medications 03/29/18 03/30/18 03/31/18 04/01/18 04/02/18 04/03/18 04/04/18         Dose: 1 tablet  Freq: 4 TIMES DAILY Route: PO  Start: 03/31/18 1630   End: 04/02/18 1957      1710 (1 tablet)-Given       2020 (1 tablet)-Given        0756 (1 tablet)-Given       1155 (1 tablet)-Given       1557 (1 tablet)-Given       2004 (1 tablet)-Given        0759 (1 tablet)-Given       (1442)-Not Given       1603 (1 tablet)-Given       1957-Med Discontinued           Dose: 2 mg  Freq: 4 TIMES DAILY PRN Route: PO  PRN Reason: diarrhea  Start: 04/02/18 0829   End: 04/02/18 1957 1957-Med Discontinued           Rate: 40 mL/hr   Freq: TPN CONTINUOUS Route: CENTRAL LINE  Start: 04/01/18 2000   End: 04/02/18 1959   Admin Instructions: Infuse using a 0.22 micron filter.  Nurse Instructions: When TPN is discontinued, decrease rate to   of current rate for 1 hour. May continue at   rate until bag runs out or for 24h.          2002 ( )-New Bag        1607 ( )-Rate/Dose Verify       1959-Med Discontinued           Rate: 40 mL/hr   Freq: TPN CONTINUOUS Route: CENTRAL LINE  Start: 03/31/18 2000   End: 04/01/18 1959    Admin Instructions: Infuse using a 0.22 micron filter.  Nurse Instructions: When TPN is discontinued, decrease rate to   of current rate for 1 hour. May continue at   rate until bag runs out or for 24h.         2006 ( )-New Bag        1959-Med Discontinued            Dose: 50 mg  Freq: EVERY 6 HOURS PRN Route: PO  PRN Reason: moderate pain  Start: 03/26/18 0922   End: 04/04/18 1129    0800 (50 mg)-Given        0202 (50 mg)-Given       1953 (50 mg)-Given        0411 (50 mg)-Given       1212 (50 mg)-Given       2132 (50 mg)-Given        0408 (50 mg)-Given       1006 (50 mg)-Given       1557 (50 mg)-Given       2204 (50 mg)-Given        0335 (50 mg)-Given       0939 (50 mg)-Given        0414 (50 mg)-Given       1051 (50 mg)-Given       2018 (50 mg)-Given        0804 (50 mg)-Given       1129-Med Discontinued    Medications 03/29/18 03/30/18 03/31/18 04/01/18 04/02/18 04/03/18 04/04/18

## 2018-03-19 NOTE — IP AVS SNAPSHOT
` `     UNIT 7D [facility]: [phone]                 INTERAGENCY TRANSFER FORM - NOTES (H&P, Discharge Summary, Consults, Procedures, Therapies)   3/19/2018                    Hospital Admission Date: 3/19/2018  GERHARD SU   : 1940  Sex: Female        Patient PCP Information     Provider PCP Type    Felisha Davis NP General         History & Physicals      H&P by Jazmine Johns MD at 3/19/2018  2:07 PM     Author:  Jazmine Johns MD Service:  Oncology Author Type:  Physician    Filed:  3/19/2018  3:39 PM Date of Service:  3/19/2018  2:07 PM Creation Time:  3/19/2018  2:01 PM    Status:  Attested :  Jazmine Johns MD (Physician)    Cosigner:  Aleksandar Howe MD at 3/19/2018  7:01 PM        Attestation signed by Aleksandar Howe MD at 3/19/2018  7:01 PM        Attestation:  Physician Attestation   I, Aleksandar Howe, personally examined and evaluated this patient.  I discussed the patient with the resident and care team, and agree with the assessment and plan of care as documented in the resident s note of 3-19-18.      I personally reviewed vital signs, medications, labs and imaging.    Patient tells me she has some orthostatic symptoms and recent falls.    I have independently seen and examined this patient and my assessment is in agreement with the above note.  I have reviewed all tests and past medical history and my evaluation agrees with the findings in the note.    Key findings:    Will consult PT and OT; hydration.  Patient wants to try chicken soup!  This is a good idea.  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      #Diarrhea, nausea-  likely related related to her chemoradiation.  5-FU related diarrhea combined with local mucosal irritation from radiation therapy is likely etiology.  Rule out infectious causes of diarrhea with stool studies.  - supportive care with Loperamide, Zofran   - C. Diff, enteric stool panel  -  cc/hr     # Falls-likely related to dehydration.  May have some side effects from tincture of opium as well.  No focal neurologic deficits on examination.  She did not hit her head, currently no indication for CT scan.   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 Primary oncologist Dr. Ray. Completed chemotherapy and currently undergoing radiation.  Plan to continue radiation during hospitalization.[FH1.1]  - Lidocaine cream to rectum prn[FH1.2]     PPX: Lovenox  Code: Full  Diet: Regular[FH1.1]    Patient seen and discussed with Dr. 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 Course has been complicated by mouth sores, neutropenia, hypokalemia, dehydration requiring IV fluids.  She presented for weekly radiation oncology clinic visit today, reported increased confusion and unsteadiness over the past 3 or 4 days.  She was seen in the emergency department for the symptoms on 3/17.[FH1.1]  During the emergency department visit, she ate some red Jell-O which she brought home and proceeded to finish the Jell-O. Shortly after, she had explosive diarrhea and one episode of regurgitation of Jell-O.  She then had another episode of diarrhea.[FH1.2]  she had a fall and landed on her  back and right hip[FH1.1] after this[FH1.2].  No head trauma or loss of consciousness.      She was started on tincture of opium for diarrhea last week.  She is having 5-6 diarrhea stools per day.  She has  bilateral lower extremity edema[FH1.1] which she also had after her first infusion of 5-FU[FH1.2].  She was referred to emergency department due to concern that she could not take care of herself at home due to toxicities from her chemoradiation.[FH1.1]  Elizabeth states that she has lots of support from her friends and neighbors who live in her building and close by.  She lives alone.    She denies any fevers, chills, blood in the stool.  She has been having pain in the anal area which has been improved with lidocaine cream.[FH1.2]           ONCOLOGY HISTORY: (copied from last oncology note) (primary oncologist Dr. Ray)   77-year-old female who developed bright red blood per rectum started about 4 years ago and progressively got worse. She underwent a colonoscopy in April 2017 which noted to have small anal fissure along with internal hemorrhoids. Symptoms continued and  she was referred to colorectal surgery for further evaluation. On exam she was found to have an 1.5 cm anal lesion on the left side along with left groin palpable adenopathy. Biopsy of anal mass came back positive for squamous cell carcinoma. Patient underwent staging workup with MRI pelvis and a PET scan- showed PET avid left anal canal mass along with small left inguinal FDG avid lymph nodes.      02/12/18- Started on concurrent chemoradiation with 5FU/Mitomycin. 2nd dose of MItomycin omitted due to symptoms           Past Medical History:[FH1.1]     Past Medical History:   Diagnosis Date     Anal cancer (H)      Endometriosis, site unspecified      Thyroiditis, unspecified     Thryoiditis-resolved[FH1.3]             Past Surgical History:[FH1.1]      Past Surgical History:   Procedure Laterality Date     APPENDECTOMY      as a child      "COLONOSCOPY N/A 4/13/2017    Procedure: COLONOSCOPY;  Surgeon: Juan Dos Santos MD;  Location: [facility]     INSERT PORT VASCULAR ACCESS Right 2/8/2018    Procedure: INSERT PORT VASCULAR ACCESS;  Place Single Lumen Venous Chest Port Placement;  Surgeon: Zaire Moreira PA-C;  Location: [facility] OR     SURGICAL HISTORY OF -       endometriosis[FH1.3]             Social History:[FH1.1]     Social History   Substance Use Topics     Smoking status: Never Smoker     Smokeless tobacco: Never Used     Alcohol use No[FH1.3]      Lives alone in apartment in [place]. Previously . Now single. Has two sisters. Multiple friends who have been helping out.[FH1.2]          Family History:[FH1.1]     Family History   Problem Relation Age of Onset     CANCER Sister      CANCER Maternal Grandmother      lymphoma     HEART DISEASE Father      MI     Uterine Cancer Niece[FH1.3]      Family history reviewed          Immunizations:   Immunization status is unknown          Allergies:[FH1.1]     Allergies   Allergen Reactions     Darvon [Propoxyphene]      Had reaction in teen years     Penicillins      As a child     Ketoconazole Rash[FH1.3]             Medications:[FH1.1]     (Not in a hospital admission)[FH1.3]          Review of Systems:   The Review of Systems is negative other than noted in the HPI[FH1.1]      /57  Pulse 80  Temp 95.4  F (35.2  C) (Oral)  Resp 18  Ht 1.651 m (5' 5\")  SpO2 96%  BMI 21.77 kg/m2[FH1.3]  Gen: Alert, NAD[FH1.1], thin[FH1.2]   HEENT: MMM, no oral lesions  Neck: supple  Lymph: no cervical, sc, axillary LAD   CV: RRR, no murmurs  Resp: CTAB  Abd: Soft, NTND, no HSM   Ext: no edema[FH1.1]   Skin: Port in place, c/d/i[FH1.2]   Neuro: AOX3, no focal deficits[FH1.1], tangential[FH1.2]            Data:[FH1.1]     Lab Results   Component Value Date    WBC 1.3 (L) 03/17/2018    HGB 12.0 03/17/2018    HCT 35.9 03/17/2018     03/17/2018     03/17/2018    POTASSIUM 3.4 " 03/17/2018    CHLORIDE 104 03/17/2018    CO2 21 03/17/2018    BUN 15 03/17/2018    CR 0.79 03/17/2018     (H) 03/17/2018    SED 25 12/05/2012    TROPI 0.035 03/17/2018    AST 27 03/17/2018    ALT 25 03/17/2018    ALKPHOS 49 03/17/2018    BILITOTAL 1.0 03/17/2018    RADHA 36 03/17/2018    INR 1.46 (H) 03/17/2018[FH1.4]          Jazmine Johns MD[FH1.1]     Revision History        User Key Date/Time User Provider Type Action    > FH1.2 3/19/2018  3:39 PM Jazmine Johns MD Physician Sign     FH1.4 3/19/2018  2:07 PM Jazmine Johns MD Physician      FH1.3 3/19/2018  2:06 PM Jazmine Johns MD Physician      FH1.1 3/19/2018  2:01 PM Jazmine Johns MD Physician                   Discharge Summaries     No notes of this type exist for this encounter.         Consult Notes      Consults by Ayana Tay LICSW at 3/22/2018 12:23 PM     Author:  Ayana Tay LICSW Service:  Palliative Author Type:      Filed:  3/23/2018  1:44 PM Date of Service:  3/22/2018 12:23 PM Creation Time:  3/22/2018 12:20 PM    Status:  Signed :  Ayana Tay LICSW ()     Consult Orders:    1. Palliative Care Adult IP Consult: Patient to be seen: Routine within 24 hrs; Call back #: [phone];  only; Consultant may enter orders: Yes [[id number]] ordered by Mei Ramon PA-C at 03/21/18 1317                Palliative Care Inpatient Clinical Social Work Assessment    Patient Information:  Elizabeth is a 77 year old woman with stage IIIA SCC of anus on chemoradiation who is admitted with intractable diarrhea, nausea, weakness and dehydration. She has completed chemotherapy and is currently undergoing radiation. Palliative clinical social work has been consulted for assessment of coping and overall goals of care discussion.    Visit summary:[BK1.1] I met with Elizabeth in the late afternoon. She had been busy upon prior attempts but did express interest in meeting. Once we were able to meet she was talkative and  "seemed to process through events by talking. She initially told me about the course of events over the past week starting with attending her oncology appointments last week with her friend and planning out the [employer] classes for the weekend. She then tells me of how she came to the hospital. Some details did not seem to line up but as she talked further this seemed to make more sense. She also reflected on her \"spirit\" and how she has been feeling. At this point her plan of care seems to align with her current goals.[BK1.2]    Relevant Symptoms/Concerns     Physical:[BK1.1]  Diarrhea, she hopes this will resolve with \"the opium\" that she is receiving. She noted some pain during shifting in the bed but did not further comment on this.[BK1.2]    Psychological/Emotional/Existential:[BK1.1]  There has been concern this week with how she is coping with physical symptoms.[BK1.2] Mood has been improving over course of her stay.[BK1.3]   F[BK1.2]amily/Social/Caregiver:[BK1.1]   Did not thoroughly assess family system or sources of support today.[BK1.3]   Developmental:      Mental Health:      End of Life:      Cultural/Restorationism/Spiritual:      Grief/Loss:      Concurrent Stressors:        Comments:       Strengths     Physical:[BK1.1] She found transporting to radiation lying down rather than in a wheelchair was very helpful and made it more tolerable.[BK1.2]   Psychological/Emotional/Existential:[BK1.1]  Today she is feeling \"lifted\"[BK1.2]   Family/Social/Caregiver:[BK1.1]  She expresses gratitude for her friends and students. 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 The daughter of this duo, Mei, is a nurse and it sounds like she assists Elizabeth to appointments at times.[BK1.2]   Developmental:      Mental Health:      End of Life:      Cultural/Restorationism/Spiritual:      Grief/Loss:   " "      Comments:       Goals/Decision Making/Advance Care Planning   Preferences:[BK1.1]  She hopes that tomorrow (Friday) is her last radiation treatment.[BK1.2]    Concerns:[BK1.1]  She reviewed how earlier in the week she was not sure she wanted to \"keep going with all of this.\" She said that she was not feeling well and that her \"spirits were down.\" After encouragement from staff and working on ways to help her feel better she says that her \"spirits are now lifted.\"[BK1.2]    Documents:[BK1.1]  No health care directive on file - this is important to assess at future visits[BK1.2]   Decision Making Issues:        Comments:       Resource Needs     Discharge Planning:  Per Unit/Program  and/or Care Coordinator   Other:      Comments:       Sources of Information   Patient:[BK1.1]  Elizabeth[BK1.3]   Family:[BK1.1]  No family present[BK1.3]   Staff:[BK1.1]  Mei Ramon, Oncology PA[BK1.3]   Chart Review:      Other:       Intervention (Check all that apply)[BK1.1]    X[BK1.3]   Assessment of palliative specific issues          Introduction of Palliative clinical social work interventions[BK1.1]     X[BK1.3]   Adjustment to illness counseling        Advanced care planning        Attended/participated in care conference        Behavioral interventions for symptom management[BK1.1]    X[BK1.3]   Facilitation of processing of thoughts/feelings        Family communication facilitated        Grief counseling[BK1.1]     X[BK1.3]   Goals of care discussion/facilitation        Life legacy work        Life review facilitation        Psychoeducation        Re-framing        Resource referral        Other:       Comments:[BK1.1]  Elizabeth was talkative and engaged. She apologized for talking so much yet also said that she feels that talking through what has happened the past couple days has been helpful.[BK1.3]    Plan:[BK1.1]  I will plan to follow up next week if she remains inpatient.[BK1.3]       JEFF Adrian, " [facility]  Palliative Care Clinical   Pager [phone]    [facility] Inpatient Team Consult pager [phone] (M-F 8-4:30)  After-hours Answering Service [phone][BK1.1]        Revision History        User Key Date/Time User Provider Type Action    > BK1.3 3/23/2018  1:44 PM [facility]  Sign     BK1.2 3/23/2018  1:28 PM Ayana Tay [facility]       BK1.1 3/22/2018 12:20 PM Ayana Tay [facility]                       Progress Notes - Physician (Notes from 04/01/18 through 04/04/18)      Progress Notes by Peg Brunson MSW at 4/3/2018  4:39 PM     Author:  Peg Brunson MSW Service:  (none) Author Type:      Filed:  4/3/2018  4:47 PM Date of Service:  4/3/2018  4:39 PM Creation Time:  4/3/2018  4:39 PM    Status:  Signed :  Peg Brunson MSW ()         Social Work Services Progress Note    Hospital Day: 15  Date of Initial Social Work Evaluation:  To be completed.  Collaborated with:  Patient, patient's friend Adamaris (cell [phone]; work [phone]), Brenda Gurrola PA-C, [facility] admissions liaison Nicol (p: [phone])    Data:  THOMAS consulted for discharge planning, PT/OT recommendations for TCU    Intervention:  Per Brenda Gurrola PA-C, anticipate that patient will be medically stable for discharge to TCU facility tomorrow, pending placement.    THOMAS spoke w/ [facility] admissions liaison Nicol (p: [phone]) who reports that she has sent updated notes and MAR to [facility] to review, waiting to hear back from facility for final confirmation if able to accept patient tomorrow. SW awaiting call from Nicol for this confirmation.    SW received phone call from patient's friend Adamaris who patient has wanted involved in discharge planning process. SW gave update of possible discharge tomorrow pending acceptance at [facility], Adamaris is in agreement w/ this plan. Adamaris requests SW call her tomorrow  "w/ update on discharge plan.    SW met w/ patient at bedside to provide update on discharge planning. Patient glad to hear that Adamaris continues to check in w/ SW on discharge plan. Patient shares that she is concerned that she is \"not ready\" for discharge tomorrow due to a \"big setback\" she has had recently, requests to speak to hem/onc team about these concerns. SW will update Brenda Gurrola PA-C tomorrow w/ patient's concern re: discharge plan tomorrow. Patient aware that SW awaiting final confirmation from [facility], reports that she would be \"very happy\" going to that facility. Patient requested information on available transportation options to facility. SW explained difference between stretcher and wheelchair transport, explained that they could be private pay if not covered by patient's medical insurance. Patient shares concerns about her finances stating that she does not feel that she could afford stretcher transport if it is considerably more expensive than wheelchair transport, thinks that she would feel comfortable sitting in wheelchair for transport but \"wants to think about it tonight.\" Patient continues to tell this writer that she would be \"more on board with Thursday for leaving.\" SW explained to patient that if she is medically stable for discharge and a TCU placement has been secured then we would be looking at discharge tomorrow. Patient again requests to discuss her concerns w/ hem/onc team.    Assessment:  Patient remains agreeable w/ plan to discharge to TCU when medically stable, does share concerns about \"being ready\" tomorrow.    Plan:    Anticipated Disposition:  Facility:  Waiting for final confirmation from [facility] TCU if able to accept patient    Barriers to d/c plan:  Patient reports not feeling \"ready\" for discharge tomorrow, prefers Thursday.    Follow Up:  SW to continue following for discharge planning.    Peg Brunson, MSW, KENYASW  7D Oncology THOMAS  Phone " "[phone]  Pager [phone][TF1.1]           Revision History        User Key Date/Time User Provider Type Action    > TF1.1 4/3/2018  4:47 PM Peg Brunson MSW  Sign            Progress Notes by Miriam Tafoya at 4/3/2018 12:24 PM     Author:  Miriam Tafoya Service:  Spiritual Health Author Type:      Filed:  4/3/2018 12:28 PM Date of Service:  4/3/2018 12:24 PM Creation Time:  4/3/2018 12:24 PM    Status:  Signed :  Miriam Tafoya ()         SPIRITUAL HEALTH SERVICES  SPIRITUAL ASSESSMENT Progress Note  [facility]D    REFERRAL SOURCE: reviewed documentation; length of stay    I met with Elizabeth and provided SHS introduction and assessed spiritual needs. Elizabeth shared she had little energy to visit today but would like to \"hear about the big world out there.\" Reflective conversation was shared.     PLAN: I will notify unit  of care provided.     Miriam Tafoya   Intern  Pager [phone][DE1.1]       Revision History        User Key Date/Time User Provider Type Action    > DE1.1 4/3/2018 12:28 PM Miriam Tafoya  Sign            Progress Notes by Brenda Gurrola PA-C at 4/2/2018  2:16 PM     Author:  Brenda Gurrola PA-C Service:  Hem/Onc Author Type:  Physician Assistant - C    Filed:  4/2/2018  2:39 PM Date of Service:  4/2/2018  2:16 PM Creation Time:  4/2/2018  2:16 PM    Status:  Attested :  Brenda Gurrola PA-C (Physician Assistant - C)    Cosigner:  Zulay Berry MD at 4/2/2018  5:27 PM        Attestation signed by Zulay Berry MD at 4/2/2018  5:27 PM        Attestation:  Physician Attestation   Zulay RYDER, saw and evaluated Elizabeth Taylor as part of a shared visit.  I have reviewed and discussed with the advanced practice provider their history, physical and plan.    I personally reviewed the vital signs, medications, labs and imaging.    My key history or physical exam " findings: Patient feeling better today after a bad night. Had to have her farias replaced. Poor air entry bilateral lung bases. Tympanitic on abdominal exam today. She is tolerating popsicles and soups.    Key management decisions made by me: Continue cares. Chest x-ray and abdominal x-ray today.    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 Hospital course has been complicated by a SBO for which pt was started on TPN. Has developed deconditioning and is now awaiting TCU placement while managing acute issues.      #Stage IIIA (T2 N1) SCC of anus  Followed by Dr. Ray. S/p 1 cycle 5-FU + mitomycin (D1 2/12) followed by 1 cycle single-agent 5-FU (d/t toxicity and age, D29 3/11) with concurrent radiation (completed 3/23).   - Supportive rectal cares ordered  - Intermittently expressing feelings of hopelessness and feeling at peace with dying, not wanting to continue with treatment. Consulted palliative care SW for assistance with illness coping and decision making, appreciate their assistance. Feeling more hopeful now that she has completed XRT, will continue supportive cares, she has f/u appt with Dr. Ray on 4/6/18.     Malignancy/Treatment related (chemotherapy/XRT) complications:    #Intractable diarrhea  #Nausea  #Dehydration  #Electrolyte derangements  Suspect nausea, diarrhea secondary to mucositis from recent chemotherapy (5-FU) and XRT. C diff & enteric panel were negative.  Likely her anal sphincter tone is compromised, and incontinence will continue to be an issue. Course has  been complicated by SBO (see below).   - Continue Lomotil 1 tab QID (x3/31). Added Imodium prn.   - Antiemetics also available (Zofran, Compazine). No vomiting since admission.  - High lyte SS.    #Hypoxemia  #Hydropneumothorax, right side, likely secondary to fractured ribs  #Hydrothorax, left side  Stable respiratory status on ~1L NC likely 2/2 to b/l Pleural effusions. CXR on 3/29 with trace right apical pneumothorax. Moderate layering bilateral pleural effusions with bilateral lower lobes and left retrocardiac opacities, atelectasis vs consolidation. Pneumothorax likely from fall resulting in rib fractures (see below).   - Supplemental oxygenation to keep O2 >90%  - Repeat CXR 4/2 with reduced lung sounds on exam    #Small bowel obstruction, resolved  AXR 3/27 for NG tube confirmation noted concern for possible SBO. CT CAP 3/28 shows SBO. TPN started 3/29. No evidence of nausea, but does have worsening abdominal distention. Tolerated CLD 4/1 with popsicles and broth.   - Repeat KUB 4/2 with worsening abdominal distension  - With pt still having persistent diarrhea, will continue antidiarrheals, but monitor closely   - Continue CLD + TPN    #Perianal wound, secondary to radiation  - Pain controlled with tylenol and tramadol PRN   - Appreciate [facility] assistance in management of this wound  - Continue TPN given concern for compromised wound healing after XRT with malnutrition     #Pancytopenia  -Secondary to underlying disease, recent chemo/XRT  -Transfuse for Hgb <8, PLT <10K - no transfusion needs thus far     #Weakness  #Fall at home  #Fractured R 8th, 9th and 10th rib  Suspect weakness, falls secondary to above issues.  - Fall precautions  - PT/OT --> recommending PT/OT.   - Continue Tylenol, ketamine/gabapentin/lidocaine and diclofenac available topically.     #Malnutrition, in context of acute on chronic illness  % Intake: </= 50% for >/= 5 days (severe); Subcutaneous Fat Loss: Facial region: Moderate, Upper arm  and Thoracic/intercostal: Severe   - Continue TPN (3/29-present). Labs daily.      #Coagulopathy   R/t severe malnutrition & infection (UTI). S/p PO vitamin K 5 mg (3/19, 3/20, 3/21, 2x on 3/22, 3/23 and 3/27 (for INR of 1.87). Now improved.   - Daily INR     #Catheter associated UTI (Klebsiella pneumoniae & proteus mirabilis)  Pt has Joiner catheter. Completed 10 day course of Cipro 500mg BID (x3/21-3/30). Repeat UA continues to be dirty with hyaline casts, didn't reflex to culture.  - Joiner was changed 4/2 with outpt appearing cloudy/dark. Will repeat UA/UC.      #Altered mental status   Noted to be disoriented. 3/24 AM. Was getting concurrent oxycodone/opium tincture, which has held. Mental status has now improved, so likely 2/2 medications.     #Pain Assessment.  Current Pain Score 4/2/2018   Patient currently in pain? yes   Pain score (0-10) -   Pain location Other (Comment)   Pain descriptors Aching;Constant   Elizabeth is experiencing pain due to fractured ribs, radiation treatment. Pain management was discussed and the plan was created in a collaborative fashion.  Elizabeth's response to the current recommendations: engaged  compliant  - Opioid regimen: tramadol and tylenol PRN (oxycodone and opiod tincture caused ANS)   - Pharmacologic adjuvants: topical diclofenac gel, topical ketamine/gabapentin/lidocaine     FEN  - TPN (x3/29) @ 40 mL/hr  - CLD  - IVF bolus prn    PPx  - VTE: Lovenox 40 mg QD  - GI: Lomotil 1 tab QID, prn Imodium QID    Code status: Full code    Dispo: PT/OT recommending TCU. SW working with friend on facility. Likely medically ready to d/c 4/3 or 4/4.     Above plan discussed with staff physician, Dr. Kendall Gurrola, PAErosC  Hematology/Oncology  Pager: [phone]    Interval History   Diarrhea is persistent this morning. Feels more abdominal distension. No nausea. Still having pain around her ribs, but it is managed with her current pain regimen. States her LE edema is much  improved from prior. No fever, cough, SOB, abdominal pain.     Physical Exam   Temp: 98.2  F (36.8  C) Temp src: Oral BP: 111/65   Heart Rate: 92 Resp: 20 SpO2: 92 % O2 Device: Nasal cannula Oxygen Delivery: 1 LPM  Vitals:    03/23/18 1652 03/29/18 0900 03/31/18 1156   Weight: 62.9 kg (138 lb 11.2 oz) 58.4 kg (128 lb 12.8 oz) 60.7 kg (133 lb 13.1 oz)     Vital Signs with Ranges  Temp:  [96.1  F (35.6  C)-99.2  F (37.3  C)] 98.2  F (36.8  C)  Heart Rate:  [78-92] 92  Resp:  [16-20] 20  BP: (103-120)/(59-74) 111/65  SpO2:  [91 %-95 %] 92 %  I/O last 3 completed shifts:  In: 1330 [P.O.:90; I.V.:320]  Out: 725 [Urine:725]    Constitutional: Pleasant female seen laying in bed. Appears frail and deconditioned.  Eyes: Lids and lashes normal, sclera clear, conjunctiva normal.  ENT: Normocephalic, oral pharynx with moist mucus membranes, tonsils without erythema or exudates, gums normal and good dentition.   Respiratory: No increased work of breathing, good air exchange, clear to auscultation bilaterally, no crackles or wheezing.  Cardiovascular: Regular rate and rhythm, normal S1 and S2, and no murmur noted.  GI: No masses or scars. +BS. Soft. No tenderness on palpation.  Skin: Scattered bruising. No bleeding, no rashes, no lesions, no jaundice.  Extremities: There is no redness, warmth, or swelling of the joints. 2+ b/l lower extremity edema. No cyanosis.  Neurologic: Awake, alert, oriented to name, place and time.      Medications     parenteral nutrition - ADULT compounded formula       parenteral nutrition - ADULT compounded formula 40 mL/hr at 04/01/18 2002     IV fluid REPLACEMENT ONLY       - MEDICATION INSTRUCTIONS -         diphenoxylate-atropine  1 tablet Oral 4x Daily     lipids  250 mL Intravenous Once per day on Mon Tue Wed Thu Fri     heparin lock flush  5-10 mL Intracatheter Q24H     heparin  5 mL Intracatheter Q28 Days     calcium-vitamin D  1 tablet Oral Daily     multivitamin, therapeutic  1 tablet Oral  Daily     hydrocortisone   Rectal BID     vitamin B complex with vitamin C  1 tablet Oral Daily     enoxaparin  40 mg Subcutaneous Q24H       Data   ROUTINE IP LABS (Last four results)  BMP[RC1.1]  Recent Labs  Lab 04/02/18  0633 04/01/18  0554 03/31/18  0625 03/30/18  0646    136 136 137   POTASSIUM 4.4 4.3 3.8 3.7   CHLORIDE 102 102 103 102   ELISE 7.4* 7.2* 7.2* 7.7*   CO2 31 32 30 30   BUN 15 12 11 9   CR 0.45* 0.40* 0.42* 0.52   GLC 94 99 112* 118*[RC1.2]     CBC[RC1.1]  Recent Labs  Lab 04/02/18  0633 04/01/18  0554 03/31/18  0625 03/30/18  0646   WBC 6.3 5.4 5.4 5.6   RBC 3.44* 3.39* 3.50* 3.43*   HGB 10.3* 10.2* 10.5* 10.3*   HCT 32.3* 31.4* 32.9* 31.8*   MCV 94 93 94 93   MCH 29.9 30.1 30.0 30.0   MCHC 31.9 32.5 31.9 32.4   RDW 15.1* 15.1* 15.0 15.0    241 220 222[RC1.2]     INR[RC1.1]  Recent Labs  Lab 04/02/18  0633 04/01/18  0554 03/31/18  0625 03/30/18  0646   INR 1.11 1.11 1.07 1.28*[RC1.2]            Revision History        User Key Date/Time User Provider Type Action    > RC1.2 4/2/2018  2:39 PM Brenda Gurrola PA-C Physician Assistant - BE[facility]O Sign     RC1.1 4/2/2018  2:16 PM Brenda Gurrola PA-C Physician Assistant - C             Progress Notes by Gee Corona MD at 4/1/2018 12:44 PM     Author:  Gee Corona MD Service:  Oncology Author Type:  Physician    Filed:  4/1/2018 12:48 PM Date of Service:  4/1/2018 12:44 PM Creation Time:  4/1/2018 12:44 PM    Status:  Attested :  Gee Corona MD (Physician)    Cosigner:  Zulay Berry MD at 4/2/2018  5:25 PM        Attestation signed by Zulay Berry MD at 4/2/2018  5:25 PM        Attestation:  Physician Attestation   I, Zulay Argueta, personally examined and evaluated this patient.  I discussed the patient with the resident and care team, and agree with the assessment and plan of care as documented in the resident s note of 4/ [date].      I personally reviewed vital signs,  medications and labs.    Key findings: Patient feeling better today. Feels abdomen is distended. Tympanitic on exam and likely significant gas in setting of diarrhea, anti-diarrheals, and sphincter dysfunction. We will continue wound cares, TPN, and clears as tolerated.  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     Hospital complications:    #Stage IIIA (T2 N1) SCC of anus  -Followed by Dr. Ray  -S/p 1 cycle 5-FU + mitomycin (D1 2/12) followed by 1 cycle single-agent 5-FU (d/t toxicity and age, D29 3/11) with concurrent radiation  -Completed radiation 3/23  -Supportive rectal cares ordered  -Intermittently expressing feelings of hopelessness and feeling at peace with dying, not wanting to continue with treatment. Consulted palliative care SW for assistance with illness coping and decision making, appreciate their assistance. Feeling more hopeful now that she has completed XRT, will continue supportive cares, she has f/u appt with Dr. Ray in ~2 weeks.    #Hypoxemia  #Hydropneumothorax, right side, likely secondary to fractured ribs  #Hydrothorax, left side  Stable respiratory status on 2-3 L NC likely 2/2 to b/l Pleural effusions  - CXR on 3/29- Trace right apical pneumothorax. Moderate layering bilateral pleural effusions with bilateral lower lobes and left retrocardiac opacities, atelectasis versus  Consolidation.    Malignancy/Treatment related (chemotherapy/XRT) complications:    #Intractable diarrhea  #Nausea  #Dehydration  #Electrolyte derangements  #Small bowel obstruction, resolved  -Suspect nausea, diarrhea secondary to mucositis from  recent chemotherapy (5-FU) and XRT. C diff & enteric panel were negative.   - add back Lomotil on 3/31   - AXR 3/27 for NG tube confirmation noted concern for possible SBO; CT CAP 3/28 shows SBO; did not start tube feeds, TPN instead per below.   - Likely her anal sphincter tone is compromised, and incontinence will continue to be an issue.    -Antiemetics also available (Zofran, Compazine). No vomiting since admission.  -High lyte SS.  - Clear liquid diet, as tolerated    #Perianal wound, secondary to radiation  - Pain controlled with tylenol and tramadol PRN   - Appreciate [facility] assistance in management of this wound  - Continue TPN given concern for compromised wound healing after XRT with malnutrition    #Pancytopenia  -Secondary to underlying disease, recent chemo/XRT  -Transfuse for Hgb <8, PLT <10K - no transfusion needs thus far    #Weakness  #Fall at home  #Fractured R 8th, 9th and 10th rib  -Suspect weakness, falls secondary to above issues.  -Fall precautions  - Has known pneumothorax, likely from this; Monitor.   -PT/OT --> encouraged patient to work with them, she is very motivated but struggling with pain and weakness.   -Also has Tylenol, ketamine/gabapentin/lidocaine and diclofenac available topically, which she finds helpful, so will continue.    #Malnutrition, in context of acute on chronic illness  - Continue TPN (3/29-present). Labs Qdaily.   - % Intake: </= 50% for >/= 5 days (severe); Subcutaneous Fat Loss: Facial region: Moderate, Upper arm and Thoracic/intercostal: Severe   - Calorie counts started 3/22, minimal intake, not meeting nutritional needs  - Suspect lack of good PO intake is contributing to impaired wound healing   - NG tube placement 3/27 with plan for tube feedings, however found to have SBO.     #Coagulopathy r/t severe malnutrition & infection (UTI)  -Suspect secondary to malnutrition. Dosing daily with vitamin K PRN.  -Got PO vitamin K 5 mg (3/19, 3/20, 3/21, 2x on 3/22, 3/23  and 3/27 (for INR of 1.87) none since  -INR 1/44 today   - Hopefully starting nutrition will help with this.     #UTI (Klebsiella pneumoniae & proteus mirabilis)  -Continue Cipro (x3/21) 500mg BID, plan for 10 days of therapy (x3/21-3/30).  -Repeat UA continues to be dirty with hyaline casts, didn't reflex to culture.    #Altered mental status >> improved/resolved likely r/t MEDS 3/25 AM  -Nonsensical statements this morning, disoriented. 3/24 AM.  -Was getting concurrent oxycodone/opium tincture. Will now hold.  -Hypoxia 2/2 to volume overload.  -Blood culture: NTD  -Urine culture: 3/19 with klebsiella PNA on ciprofloxacin  -CXR with pleural effusions.     PAIN # Pain Assessment:  Current Pain Score 4/1/2018   Patient currently in pain? yes   Pain score (0-10) -   Pain location Abdomen   Pain descriptors Aching;Tightness   - Elizabeth is experiencing pain due to fractured ribs, radiation treatment. Pain management was discussed and the plan was created in a collaborative fashion.  Elizabeth's response to the current recommendations: engaged  compliant  - Opioid regimen: tramadol and tylenol PRN (oxycodone and opiod tincture caused ANS)   - Pharmacologic adjuvants: topical diclofenac gel, topical ketamine/gabapentin/lidocaine cream   FEN - Fluid bolus PRN  - Start TPN (3/29)  - continue farias placement   PPX (VTE & GI) - lovenox SQ 40 mg daily (plt 114)  - Bowel regimen: not needed- having diarrhea   LINES & DRAINS & WOUNDS - Port  - Farias  - Wound on sacrum   CONSULTS - OT, PT, WOC, nutrition   CODE - FULL   DISPO - Anticipate d/c to TCU 4/2 or 4/3 on TPN      Above plan discussed with staff physician, Dr. Argueta.     William Corona MD  Heme/Onc Fellow  Pager: [phone]      Interval history  Elizabeth is doing ok. Having more po intake - she had 5 purple popsicles overnight.   Diarrhea is decreasing. Pain in rectal area is improving.   Abdomen is slightly more distended. No n/v.     Physical Exam  Constitutional: Awake,  alert, cooperative, in NAD. Elderly/frail and cachectic.   Eyes: PERRL, sclera clear, conjunctiva normal.  ENT: moist mucus membranes, no lesions or thrush.   Respiratory: Non-labored breathing, absent in bases, no wheezing, no rales.   Cardiovascular: RRR, no murmur noted.  GI: +BS, soft, slightly distended, non-tender   Skin: No concerning lesions or rash on exposed areas.  Musculoskeletal: moving all ext  Neurologic: Awake, A&O x 3. Cranial nerves II-XII are grossly intact.   Neuropsychiatric: Calm, normal affect, not confused, memory intact, judgement/insight intact.     Rounding:  Temp:  [97.3  F (36.3  C)-98.9  F (37.2  C)] 98.2  F (36.8  C)  Heart Rate:  [67-92] 67  Resp:  [16-20] 16  BP: (103-131)/(52-74) 122/72  SpO2:  [90 %-96 %] 95 %    I/O last 3 completed shifts:  In: 1980 [P.O.:540; I.V.:520]  Out: 1400 [Urine:1325; Stool:75]    Vitals:    03/20/18 0744 03/21/18 2030 03/23/18 1652 03/29/18 0900   Weight: 57.6 kg (126 lb 14.4 oz) 60.5 kg (133 lb 4.8 oz) 62.9 kg (138 lb 11.2 oz) 58.4 kg (128 lb 12.8 oz)    03/31/18 1156   Weight: 60.7 kg (133 lb 13.1 oz)       Recent Labs  Lab 04/01/18  0554 03/31/18  0625 03/30/18  0646 03/29/18  0633   WBC 5.4 5.4 5.6 5.9   RBC 3.39* 3.50* 3.43* 3.66*   HGB 10.2* 10.5* 10.3* 11.1*   HCT 31.4* 32.9* 31.8* 34.3*   MCV 93 94 93 94   MCH 30.1 30.0 30.0 30.3   MCHC 32.5 31.9 32.4 32.4   RDW 15.1* 15.0 15.0 14.9    220 222 212       Recent Labs  Lab 04/01/18  0554 03/31/18  0625 03/30/18  0646 03/29/18  0633    136 137 137   POTASSIUM 4.3 3.8 3.7 3.6   CHLORIDE 102 103 102 101   CO2 32 30 30 30   ANIONGAP 2* 3 5 6   GLC 99 112* 118* 49*   BUN 12 11 9 7   CR 0.40* 0.42* 0.52 0.62   GFRESTIMATED >90 >90 >90 >90   GFRESTBLACK >90 >90 >90 >90   ELISE 7.2* 7.2* 7.7* 7.6*   MAG 2.0 2.0 1.9 2.1   PHOS 2.5 2.0* 2.0* 3.2   PROTTOTAL 4.0* 4.1* 3.8* 4.0*   ALBUMIN 1.0* 1.0* 0.9* 1.1*   BILITOTAL 0.3 0.3 0.8 0.5   ALKPHOS 90 80 75 83   AST 17 16 22 19   ALT 14 14 14 15     No  results found for this or any previous visit (from the past 24 hour(s)).  Anti-infectives     None          diphenoxylate-atropine  1 tablet Oral 4x Daily     lipids  250 mL Intravenous Once per day on Mon Tue Wed Thu Fri     heparin lock flush  5-10 mL Intracatheter Q24H     heparin  5 mL Intracatheter Q28 Days     calcium-vitamin D  1 tablet Oral Daily     multivitamin, therapeutic  1 tablet Oral Daily     hydrocortisone   Rectal BID     vitamin B complex with vitamin C  1 tablet Oral Daily     enoxaparin  40 mg Subcutaneous Q24H       parenteral nutrition - ADULT compounded formula       parenteral nutrition - ADULT compounded formula 40 mL/hr at 03/31/18 2006     IV fluid REPLACEMENT ONLY       - MEDICATION INSTRUCTIONS -[DP1.1]                  Revision History        User Key Date/Time User Provider Type Action    > DP1.1 4/1/2018 12:48 PM Gee Corona MD Physician Sign            Progress Notes by Gee Corona MD at 3/31/2018  4:27 PM     Author:  Gee Corona MD Service:  Oncology Author Type:  Physician    Filed:  3/31/2018  4:30 PM Date of Service:  3/31/2018  4:27 PM Creation Time:  3/31/2018  4:27 PM    Status:  Attested :  Gee Corona MD (Physician)    Cosigner:  Zulay Berry MD at 4/2/2018  5:20 PM        Attestation signed by Zulay Berry MD at 4/2/2018  5:20 PM        Attestation:  Physician Attestation   I, Zulay Argueta, personally examined and evaluated this patient.  I discussed the patient with the resident and care team, and agree with the assessment and plan of care as documented in the resident s note of 3/31/18 [date].      I personally reviewed vital signs, medications, labs and imaging.    Key findings: Patient doing better today. Remains on TPN. Reviewed imaging studies with her today. Oxygen requirements are stable. She continues loose bowel movements and anus and perineum pain from prior  radiation.    Continue supportive cares.   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     Hospital complications:    #Stage IIIA (T2 N1) SCC of anus  -Followed by Dr. Ray  -S/p 1 cycle 5-FU + mitomycin (D1 2/12) followed by 1 cycle single-agent 5-FU (d/t toxicity and age, D29 3/11) with concurrent radiation  -Completed radiation 3/23  -Supportive rectal cares ordered  -Intermittently expressing feelings of hopelessness and feeling at peace with dying, not wanting to continue with treatment. Consulted palliative care SW for assistance with illness coping and decision making, appreciate their assistance. Feeling more hopeful now that she has completed XRT, will continue supportive cares, she has f/u appt with Dr. Ray in ~2 weeks.    #Hypoxemia  #Hydropneumothorax, right side, likely secondary to fractured ribs  #Hydrothorax, left side  Stable respiratory status on 2-3 L NC likely 2/2 to b/l Pleural effusions  - CXR on 3/29- Trace right apical pneumothorax. Moderate layering bilateral pleural effusions with bilateral lower lobes and left retrocardiac opacities, atelectasis versus  Consolidation.  - Oxygen demands stable today    Malignancy/Treatment related (chemotherapy/XRT) complications:    #Intractable diarrhea  #Nausea  #Dehydration  #Electrolyte derangements  #Small bowel obstruction, resolved  -Suspect nausea, diarrhea secondary to mucositis from recent chemotherapy (5-FU) and XRT. C diff & enteric panel were negative.   - add back Lomotil on 3/31   - AXR 3/27 for NG tube confirmation noted concern for possible SBO; CT CAP 3/28 shows SBO;  Continue to hold tube feeds, TPN instead per below.   - Likely her anal sphincter tone is compromised, and incontinence will continue to be an issue.    -Antiemetics also available (Zofran, Compazine). No vomiting since admission.  -High lyte SS.  - Clear liquid diet, as tolerated    #Perianal wound, secondary to radiation  - Pain controlled with tylenol and tramadol PRN   - Appreciate WOC assistance in management of this wound  - Continue TPN given concern for compromised wound healing after XRT with malnutrition    #Pancytopenia  -Secondary to underlying disease, recent chemo/XRT  -Transfuse for Hgb <8, PLT <10K - no transfusion needs thus far    #Weakness  #Fall at home  #Fractured R 8th, 9th and 10th rib  -Suspect weakness, falls secondary to above issues.  -Fall precautions  - Has known pneumothorax, likely from this; Monitor.   -PT/OT --> encouraged patient to work with them, she is very motivated but struggling with pain and weakness.   -Also has Tylenol, ketamine/gabapentin/lidocaine and diclofenac available topically, which she finds helpful, so will continue.    #Malnutrition, in context of acute on chronic illness  - % Intake: </= 50% for >/= 5 days (severe); Subcutaneous Fat Loss: Facial region: Moderate, Upper arm and Thoracic/intercostal: Severe   - Calorie counts started 3/22, minimal intake, not meeting nutritional needs  - Suspect lack of good PO intake is contributing to impaired wound healing   - NG tube placement 3/27 with plan for tube feedings, however found to have SBO.   - Continue TPN (3/29-present). Labs Qdaily.     #Coagulopathy r/t severe malnutrition & infection (UTI)  -Suspect secondary to malnutrition. Dosing daily with vitamin K PRN.  -Got PO vitamin K 5 mg (3/19, 3/20, 3/21, 2x on 3/22, 3/23 and 3/27 (for INR of 1.87) none since  -INR 1/44 today   - Hopefully starting nutrition will help with this.     #UTI (Klebsiella pneumoniae & proteus mirabilis)  -Continue Cipro (x3/21) 500mg  BID, plan for 10 days of therapy (x3/21-3/30).  -Repeat UA continues to be dirty with hyaline casts, didn't reflex to culture.    #Altered mental status >> improved/resolved likely r/t MEDS 3/25 AM  -Nonsensical statements this morning, disoriented. 3/24 AM.  -Was getting concurrent oxycodone/opium tincture. Will now hold.  -Hypoxia 2/2 to volume overload.  -Blood culture: NTD  -Urine culture: 3/19 with klebsiella PNA on ciprofloxacin  -CXR with pleural effusions.     PAIN # Pain Assessment:  Current Pain Score 3/31/2018   Patient currently in pain? yes   Pain score (0-10) -   Pain location Abdomen   Pain descriptors Tightness   - Elizabeth is experiencing pain due to fractured ribs, radiation treatment. Pain management was discussed and the plan was created in a collaborative fashion.  Elizabeth's response to the current recommendations: engaged  compliant  - Opioid regimen: tramadol and tylenol PRN (oxycodone and opiod tincture caused ANS)   - Pharmacologic adjuvants: topical diclofenac gel, topical ketamine/gabapentin/lidocaine cream   FEN - Fluid bolus PRN  - Start TPN (3/29)  - continue farias placement   PPX (VTE & GI) - lovenox SQ 40 mg daily (plt 114)  - Bowel regimen: not needed- having diarrhea   LINES & DRAINS & WOUNDS - Port  - Farias  - Wound on sacrum   CONSULTS - OT, PT, WOC, nutrition   CODE - FULL   DISPO - Anticipate d/c to TCU  4/2 or 4/3 on TPN days pending tolerance to TPN and control of pain.      Above plan discussed with staff physician, Dr. Argueta.     William Corona MD  Heme/Onc Fellow  Pager: [phone]      Interval history  Elizabeth is feeling better today. Had a shower. Eating clear liquids and tolerating this. ~5 loose stools yesterday.   Pain is controlled.     Physical Exam  Constitutional: Awake, alert, cooperative, in NAD. Elderly/frail and cachectic.   Eyes: PERRL, EOMI, sclera clear, conjunctiva normal.  ENT: Normocephalic, without obvious abnormality, moist mucus membranes, no lesions or  thrush. NG tube in place.   Respiratory: Non-labored breathing, absent in bases, no wheezing, no rales.   Cardiovascular: RRR, no murmur noted.  GI: +BS, soft, non-distended, non-tender   Skin: No concerning lesions or rash on exposed areas.  Musculoskeletal: moving all ext  Neurologic: Awake, A&O x 3. Cranial nerves II-XII are grossly intact.   Neuropsychiatric: Calm, normal affect, not confused, memory intact, judgement/insight intact.     Rounding:  Temp:  [97.3  F (36.3  C)-98.6  F (37  C)] 97.7  F (36.5  C)  Pulse:  [74] 74  Heart Rate:  [70-92] 92  Resp:  [15-20] 20  BP: (103-122)/(59-71) 103/59  SpO2:  [93 %-96 %] 93 %    I/O last 3 completed shifts:  In: 1982.96 [P.O.:360; I.V.:750]  Out: 625 [Urine:550; Stool:75]    Vitals:    03/20/18 0744 03/21/18 2030 03/23/18 1652 03/29/18 0900   Weight: 57.6 kg (126 lb 14.4 oz) 60.5 kg (133 lb 4.8 oz) 62.9 kg (138 lb 11.2 oz) 58.4 kg (128 lb 12.8 oz)    03/31/18 1156   Weight: 60.7 kg (133 lb 13.1 oz)       Recent Labs  Lab 03/31/18  0625 03/30/18  0646 03/29/18  0633 03/28/18  0556   WBC 5.4 5.6 5.9 6.0   RBC 3.50* 3.43* 3.66* 3.59*   HGB 10.5* 10.3* 11.1* 10.9*   HCT 32.9* 31.8* 34.3* 34.1*   MCV 94 93 94 95   MCH 30.0 30.0 30.3 30.4   MCHC 31.9 32.4 32.4 32.0   RDW 15.0 15.0 14.9 15.0    222 212 197       Recent Labs  Lab 03/31/18  0625 03/30/18  0646 03/29/18  0633 03/28/18  0556    137 137 136   POTASSIUM 3.8 3.7 3.6 4.2   CHLORIDE 103 102 101 102   CO2 30 30 30 28   ANIONGAP 3 5 6 6   * 118* 49* 71   BUN 11 9 7 8   CR 0.42* 0.52 0.62 0.59   GFRESTIMATED >90 >90 >90 >90   GFRESTBLACK >90 >90 >90 >90   ELISE 7.2* 7.7* 7.6* 7.5*   MAG 2.0 1.9 2.1 2.0   PHOS 2.0* 2.0* 3.2 2.6   PROTTOTAL 4.1* 3.8* 4.0* 4.0*   ALBUMIN 1.0* 0.9* 1.1* 1.0*   BILITOTAL 0.3 0.8 0.5 0.6   ALKPHOS 80 75 83 81   AST 16 22 19 14   ALT 14 14 15 14     No results found for this or any previous visit (from the past 24 hour(s)).  Anti-infectives     None           diphenoxylate-atropine  1 tablet Oral 4x Daily     lipids  250 mL Intravenous Once per day on Mon Tue Wed Thu Fri     heparin lock flush  5-10 mL Intracatheter Q24H     heparin  5 mL Intracatheter Q28 Days     calcium-vitamin D  1 tablet Oral Daily     multivitamin, therapeutic  1 tablet Oral Daily     hydrocortisone   Rectal BID     vitamin B complex with vitamin C  1 tablet Oral Daily     enoxaparin  40 mg Subcutaneous Q24H       parenteral nutrition - ADULT compounded formula       parenteral nutrition - ADULT compounded formula 40 mL/hr at 03/30/18 1947     IV fluid REPLACEMENT ONLY       - MEDICATION INSTRUCTIONS -[DP1.1]                  Revision History        User Key Date/Time User Provider Type Action    > DP1.1 3/31/2018  4:30 PM Gee Corona MD Physician Sign            Progress Notes by Rosa Gonzalez APRN CNP at 3/30/2018 12:39 PM     Author:  Rosa Gonzalez APRN CNP Service:  Hem/Onc Author Type:  Nurse Practitioner    Filed:  3/30/2018  1:05 PM Date of Service:  3/30/2018 12:39 PM Creation Time:  3/30/2018 12:39 PM    Status:  Attested :  Rosa Gonzalez APRN CNP (Nurse Practitioner)    Cosigner:  Zulay Berry MD at 4/2/2018  5:17 PM        Attestation signed by Zulay Berry MD at 4/2/2018  5:17 PM        Attestation:  Physician Attestation   I, Zulay Argueta, saw and evaluated Elizabeth Taylor as part of a shared visit.  I have reviewed and discussed with the advanced practice provider their history, physical and plan.    I personally reviewed the vital signs, medications, labs and imaging.    My key history or physical exam findings: Patient seen today on rounds and quite tired. Low albumin and has abdominal distension. Perineal pain controlled. She is on TPN for nutrition.    Key management decisions made by me: Continue TPN for nutrition, pain medication, and perineal cares.    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     Hospital complications:    #Stage IIIA (T2 N1) SCC of anus  -Followed by Dr. Ray  -S/p 1 cycle 5-FU + mitomycin (D1 2/12) followed by 1 cycle single-agent 5-FU (d/t toxicity and age, D29 3/11) with concurrent radiation  -Completed radiation 3/23  -Supportive rectal cares ordered  -Intermittently expressing feelings of hopelessness and feeling at peace with dying, not wanting to continue with treatment. Consulted palliative care SW for assistance with illness coping and decision making, appreciate their assistance. Feeling more hopeful now that she has completed XRT, will continue supportive cares, she has f/u appt with Dr. Ray in ~2 weeks.    #Hypoxemia  #Hydropneumothorax, right side, likely secondary to fractured ribs  #Hydrothorax, left side  recurrence, now on 2-3 L NC likely 2/2 to volume overload/pulmonary edema & b/l Pleural effusions  Developed overnight 3/23-3/24. Hypoalbuminemic with signs of third spacing. Suspect secondary to fluid overload. S/P farias placement. CXR 3/24 10 am with small bilateral layering pleural effusions with overlying atelectasis/consolidation.   - lasix 20 mg BID on 3/24-25, now d/c as dizzy with ambulation & respiratory status improved.  - Recurrence of hypoxia 3/27; CT of chest confirms hydropneumothorax of right lung likely secondary to rib fractures.  Pneumo is small and likely thoracentesis/chest tube would not address this completely, and due to albumin and fluid status, pleural effusions would likely re accumulate quickly. Monitor for now.   - CXR today- Trace right apical pneumothorax. Moderate layering  bilateral pleural effusions with bilateral lower lobes and left retrocardiac opacities, atelectasis versus  Consolidation.  - Oxygen demands stable today, did have some intermittent CP overnight 3/27, but EKG WNL. Monitor.      Malignancy/Treatment related (chemotherapy/XRT) complications:    #Intractable diarrhea  #Nausea  #Dehydration  #Electrolyte derangements  #Small bowel obstruction  -Suspect nausea, diarrhea secondary to mucositis from recent chemotherapy (5-FU) and XRT. C diff & enteric panel were negative.   - AXR 3/27 for NG tube confirmation noted concern for possible SBO; CT CAP 3/28 shows SBO; Continue to hold tube feeds, TPN instead per below.   - Previously scheduled Lomotil and octreotide 100mg TID SQ while inpatient, will see if this can be continued at TCU. Imodium available PRN. Discontinue all antidiarrheals are on hold given concern for SBO. (FYI pt had confusion with tincture of opium).  - Likely her anal sphincter tone is compromised, and incontinence will continue to be an issue.    -Antiemetics also available (Zofran, Compazine). No vomiting since admission.  -High lyte SS.  - Clear liquid diet, as tolerated    #Perianal wound, secondary to radiation  - Pain controlled with tylenol and tramadol PRN   - Appreciate [facility] assistance in management of this wound  - Continue TPN given concern for compromised wound healing after XRT with malnutrition    #Pancytopenia  -Secondary to underlying disease, recent chemo/XRT  -Transfuse for Hgb <8, PLT <10K - no transfusion needs thus far    #Weakness  #Fall at home  #Fractured R 8th, 9th and 10th rib  -Suspect weakness, falls secondary to above issues.  -Fall precautions  - Has known pneumothorax, likely from this; Monitor.   -PT/OT --> encouraged patient to work with them, she is very motivated but struggling with pain and weakness.   -Also has Tylenol, ketamine/gabapentin/lidocaine and diclofenac available topically, which she finds helpful, so will  continue.    #Malnutrition, in context of acute on chronic illness  - % Intake: </= 50% for >/= 5 days (severe); Subcutaneous Fat Loss: Facial region: Moderate, Upper arm and Thoracic/intercostal: Severe   - Calorie counts started 3/22, minimal intake, not meeting nutritional needs  - Suspect lack of good PO intake is contributing to impaired wound healing   - NG tube placement 3/27 with plan for tube feedings, however found to have SBO.   - Continue TPN (3/29-present). Labs Qdaily.     #Coagulopathy r/t severe malnutrition & infection (UTI)  -Suspect secondary to malnutrition. Dosing daily with vitamin K PRN.  -Got PO vitamin K 5 mg (3/19, 3/20, 3/21, 2x on 3/22, 3/23 and 3/27 (for INR of 1.87) none since  -INR 1/44 today   - Hopefully starting nutrition will help with this.     #UTI (Klebsiella pneumoniae & proteus mirabilis)  -Continue Cipro (x3/21) 500mg BID, plan for 10 days of therapy (x3/21-3/30).  -Repeat UA continues to be dirty with hyaline casts, didn't reflex to culture.    #Altered mental status >> improved/resolved likely r/t MEDS 3/25 AM  -Nonsensical statements this morning, disoriented. 3/24 AM.  -Was getting concurrent oxycodone/opium tincture. Will now hold.  -Hypoxia 2/2 to volume overload.  -Blood culture: NTD  -Urine culture: 3/19 with klebsiella PNA on ciprofloxacin  -CXR with pleural effusions.     PAIN # Pain Assessment:  Current Pain Score 3/30/2018   Patient currently in pain? yes   Pain score (0-10) -   Pain location Abdomen   Pain descriptors Pressure   - Elizabeth is experiencing pain due to fractured ribs, radiation treatment. Pain management was discussed and the plan was created in a collaborative fashion.  Elizabeth's response to the current recommendations: engaged  compliant  - Opioid regimen: tramadol and tylenol PRN (oxycodone and opiod tincture caused ANS)   - Pharmacologic adjuvants: topical diclofenac gel, topical ketamine/gabapentin/lidocaine cream   FEN - Fluid bolus PRN  - Start  TPN (3/29)  - continue farias placement   PPX (VTE & GI) - lovenox SQ 40 mg daily (plt 114)  - Bowel regimen: not needed- having diarrhea   LINES & DRAINS & WOUNDS - Port  - Farias  - Wound on sacrum   CONSULTS - OT, PT, WOC, nutrition   CODE - FULL   DISPO - Anticipate d/c to TCU  4/2 or 4/3 on TPN days pending tolerance to TPN and control of pain.      Above plan discussed with staff physician, Dr. Argueta.     Rosa Gonzalez, [facility]-BC  Hematology/Oncology  #[id number]      Interval history  Met with patient, She reports she feels much better this AM, but had a rough night. She has increased distention in her abdomen, and painful wound care overnight.  She states that the tramadol does cover her pain and she does not want any other pain medication. She was incontinent of stool x 1 overnight. She finds her breathing is stable, no further chest pain. She denies fevers or chills.  No fevers or chills.     Physical Exam  Constitutional: Awake, alert, cooperative, in NAD. Elderly/frail and cachectic.   Eyes: PERRL, EOMI, sclera clear, conjunctiva normal.  ENT: Normocephalic, without obvious abnormality, moist mucus membranes, no lesions or thrush. NG tube in place.   Respiratory: Non-labored breathing, absent in bases, no wheezing, no rales.   Cardiovascular: RRR, no murmur noted.  GI: +BS, soft, non-distended, mild diffuse tenderness, improved from yesteday.  Skin: No concerning lesions or rash on exposed areas.  Musculoskeletal: 1-2+ pitting edema to BL LE, wrapped.   Neurologic: Awake, A&O x 3. Cranial nerves II-XII are grossly intact.   Neuropsychiatric: Calm, normal affect, not confused, memory intact, judgement/insight intact.     Rounding:  Temp:  [95  F (35  C)-98.5  F (36.9  C)] 97.7  F (36.5  C)  Pulse:  [78-80] 80  Heart Rate:  [77-82] 79  Resp:  [14-18] 14  BP: ()/(53-57) 106/54  SpO2:  [93 %-99 %] 97 %    I/O last 3 completed shifts:  In: 1506.92 [P.O.:480; I.V.:425]  Out: 725 [Urine:725]    Vitals:    03/20/18  0744 03/21/18 2030 03/23/18 1652 03/29/18 0900   Weight: 57.6 kg (126 lb 14.4 oz) 60.5 kg (133 lb 4.8 oz) 62.9 kg (138 lb 11.2 oz) 58.4 kg (128 lb 12.8 oz)       Recent Labs  Lab 03/30/18  0646 03/29/18  0633 03/28/18  0556 03/27/18  0624   WBC 5.6 5.9 6.0 4.3   RBC 3.43* 3.66* 3.59* 3.57*   HGB 10.3* 11.1* 10.9* 10.8*   HCT 31.8* 34.3* 34.1* 33.5*   MCV 93 94 95 94   MCH 30.0 30.3 30.4 30.3   MCHC 32.4 32.4 32.0 32.2   RDW 15.0 14.9 15.0 14.9    212 197 165       Recent Labs  Lab 03/30/18  0646 03/29/18  0633 03/28/18  0556 03/27/18  0624    137 136 135   POTASSIUM 3.7 3.6 4.2 4.4   CHLORIDE 102 101 102 101   CO2 30 30 28 30   ANIONGAP 5 6 6 4   * 49* 71 88   BUN 9 7 8 8   CR 0.52 0.62 0.59 0.58   GFRESTIMATED >90 >90 >90 >90   GFRESTBLACK >90 >90 >90 >90   ELISE 7.7* 7.6* 7.5* 7.6*   MAG 1.9 2.1 2.0 2.0   PHOS 2.0* 3.2 2.6 3.7   PROTTOTAL 3.8* 4.0* 4.0* 4.1*   ALBUMIN 0.9* 1.1* 1.0* 1.1*   BILITOTAL 0.8 0.5 0.6 0.6   ALKPHOS 75 83 81 83   AST 22 19 14 12   ALT 14 15 14 14     Recent Results (from the past 24 hour(s))   XR Chest Port 1 View    Narrative    Exam: Chest x-ray, 1 view, 3/29/2018.    COMPARISON: CT exam 3/28/2018 and chest x-ray 3/24/2018.    FINDINGS: AP portable view of the chest was obtained. Trace right  apical pneumothorax. Moderate layering bilateral pleural effusions.  Bilateral lower lobes and left retrocardiac opacities.  Cardiomediastinal silhouette is not enlarged. Tortuous thoracic aorta  with calcifications. Right IJ Port-A-Cath with its tip projecting over  the high right atrium. Degenerative changes of the spine and  shoulders.      Impression    IMPRESSION:  1. Trace right apical pneumothorax.  2. Moderate layering bilateral pleural effusions with bilateral lower  lobes and left retrocardiac opacities, atelectasis versus  consolidation.    EDWARD PRIETO MD     Anti-infectives (Future)    Start     Dose/Rate Route Frequency Ordered Stop    03/21/18 1403   "ciprofloxacin (CIPRO) tablet 500 mg      500 mg Oral EVERY 12 HOURS SCHEDULED 03/21/18 0821 03/30/18 2359          lipids  250 mL Intravenous Once per day on Mon Tue Wed Thu Fri     heparin lock flush  5-10 mL Intracatheter Q24H     heparin  5 mL Intracatheter Q28 Days     calcium-vitamin D  1 tablet Oral Daily     multivitamin, therapeutic  1 tablet Oral Daily     ciprofloxacin  500 mg Oral Q12H OSCAR     hydrocortisone   Rectal BID     vitamin B complex with vitamin C  1 tablet Oral Daily     enoxaparin  40 mg Subcutaneous Q24H       parenteral nutrition - ADULT compounded formula       IV fluid REPLACEMENT ONLY       parenteral nutrition - ADULT compounded formula 40 mL/hr at 03/30/18 0050     - MEDICATION INSTRUCTIONS -[KM1.1]                  Revision History        User Key Date/Time User Provider Type Action    > KM1.1 3/30/2018  1:05 PM Rosa Gonzalez APRN CNP Nurse Practitioner Sign            Progress Notes by Ayana Tay LICSW at 4/2/2018  3:17 PM     Author:  Ayana Tay LICSW Service:  Palliative Author Type:      Filed:  4/2/2018  4:08 PM Date of Service:  4/2/2018  3:17 PM Creation Time:  4/2/2018  3:17 PM    Status:  Signed :  Ayana Tay LICSW ()         Palliative Care Inpatient Clinical Social Work Visit    Patient Information:  Barney is a 77 year old woman with stage IIIA SCC of anus on chemoradiation who is admitted with intractable diarrhea, nausea, weakness and dehydration. She has completed chemotherapy and is currently undergoing radiation. Palliative clinical social work had been consulted for assessment of coping and overall goals of care discussion. Her mood improved last week and her goals were clear.     Visit summary: Today I followed up with barney to assess the need for ongoing follow up. She was talkative and said several times that she found it helpful to talk about[BK1.1] \"[BK1.2]the psychological\" and the effects on her " "spirit.[BK1.1] She reported also finding it helpful to discuss \"the sequencing of events\" and what to expect for the rest of her day. She expressed some concern regarding working with therapies toward a common goal and conserving energy.    I started to assess pain and how this might be limiting her therapy participation yet other concerns were more pressing today.[BK1.2]    Relevant Symptoms/Concerns  - New information since last visit   Physical:[BK1.1]  Concerned about bowel movements, does feel this is slightly improving. Concerned about inability to sleep at night due to the \"excrutiating pain\" and needing to be cleaned up several times.[BK1.2]   Psychological/Emotional/Existential:[BK1.1]  She does admit that \"3 or 4 times since I've been here\" her spirit has been low and she was \"not sure I could make it through.\" She find the support of staff to be empowering and helpful. She says \"each times someone has held me and I have gotten through.\" She appreciates the ongoing support and encouragement of staff.[BK1.2]   Family/Social/Caregiver:       Developmental:    Mental Health:[BK1.1] She is looking for psychological ways to work through the pain.[BK1.2]     End of Life:       Cultural/Restorationist/Spiritual:      Grief/Loss:      Concurrent Stressors:        Comments:       Strengths - New information since last visit    Physical:     Psychological/Emotional/Existential:[BK1.1]  She tells me today that she hopes to live until age 78 and her landlord recently expressed that she should \"aim for 120\" which she found humorous and uplifting.[BK1.2]   Family/Social/Caregiver:[BK1.1]  She said that her sister, Sahil, and brother in law, Hugo, were able to bring some things from home for her. 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 She showed me how their is pressure around her diaphragm which is often relieved by belching. This helps her relax even more.[BK1.2]   End of Life:      Cultural/Taoist/Spiritual:      Grief/Loss:         Comments:       Goals/Decision Making/Advance Care Planning - New information since last visit   Preferences:[BK1.1]  Today she tells me the plan to go to a rehab facility at some point. She remembers that \"[place]\" are working on this.[BK1.2]   Concerns:      Documents:      Decision Making Issues:        Comments:       Resource Needs     Discharge Planning:  Per Unit/Program  and/or Care Coordinator   Other:      Comments:       Intervention (Check all that apply)[BK1.1]    X[BK1.2]   Assessment of palliative specific issues          Introduction of Palliative clinical social work interventions        Adjustment to illness counseling        Advanced care planning        Attended/participated in care conference        Behavioral interventions for symptom management[BK1.1]    X[BK1.2]   Facilitation of processing of thoughts/feelings        Family communication facilitated        Grief counseling        Goals of care discussion/facilitation        Life legacy work        Life review facilitation        Psychoeducation        Re-framing        Resource referral[BK1.1]        X[BK1.2]   Other[BK1.1] - reflective listening[BK1.2]     Comments:[BK1.1]  Today Elizabeth was talkative. Most of the visit consisted of reflective listening as Elizabeth \"sequenced\" the events of the recent days and upcoming night. She finds it helpful to create these " plans.[BK1.2]     Plan:[BK1.1]  I will continue assessment/intervention as indicated. I will plan to follow up at the end of the week if able. I will not be in the hospital Wed 4/4.[BK1.2]    JEFF Adrian, [facility]  Palliative Care Clinical   Pager [phone]    [facility] Inpatient Team Consult pager [phone] (M-F 8-4:30)  After-hours Answering Service [phone][BK1.1]        Revision History        User Key Date/Time User Provider Type Action    > BK1.2 4/2/2018  4:08 PM Ayana Tay [facility]  Sign     BK1.1 4/2/2018  3:17 PM [facility]                    Procedure Notes     No notes of this type exist for this encounter.      Progress Notes - Therapies (Notes from 04/01/18 through 04/04/18)     No notes of this type exist for this encounter.

## 2018-03-20 ENCOUNTER — APPOINTMENT (OUTPATIENT)
Dept: RADIATION ONCOLOGY | Facility: CLINIC | Age: 78
End: 2018-03-20
Attending: RADIOLOGY
Payer: MEDICARE

## 2018-03-20 LAB
ANION GAP SERPL CALCULATED.3IONS-SCNC: 8 MMOL/L (ref 3–14)
APTT PPP: 38 SEC (ref 22–37)
BUN SERPL-MCNC: 20 MG/DL (ref 7–30)
CALCIUM SERPL-MCNC: 7.5 MG/DL (ref 8.5–10.1)
CHLORIDE SERPL-SCNC: 104 MMOL/L (ref 94–109)
CO2 SERPL-SCNC: 24 MMOL/L (ref 20–32)
CREAT SERPL-MCNC: 0.68 MG/DL (ref 0.52–1.04)
ERYTHROCYTE [DISTWIDTH] IN BLOOD BY AUTOMATED COUNT: 14.7 % (ref 10–15)
GFR SERPL CREATININE-BSD FRML MDRD: 84 ML/MIN/1.7M2
GLUCOSE BLDC GLUCOMTR-MCNC: 118 MG/DL (ref 70–99)
GLUCOSE BLDC GLUCOMTR-MCNC: 140 MG/DL (ref 70–99)
GLUCOSE BLDC GLUCOMTR-MCNC: 68 MG/DL (ref 70–99)
GLUCOSE BLDC GLUCOMTR-MCNC: 77 MG/DL (ref 70–99)
GLUCOSE BLDC GLUCOMTR-MCNC: 89 MG/DL (ref 70–99)
GLUCOSE SERPL-MCNC: 70 MG/DL (ref 70–99)
HCT VFR BLD AUTO: 31.1 % (ref 35–47)
HGB BLD-MCNC: 10.4 G/DL (ref 11.7–15.7)
INR PPP: 1.68 (ref 0.86–1.14)
INTERPRETATION ECG - MUSE: NORMAL
MAGNESIUM SERPL-MCNC: 1.4 MG/DL (ref 1.6–2.3)
MAGNESIUM SERPL-MCNC: 2 MG/DL (ref 1.6–2.3)
MCH RBC QN AUTO: 30.6 PG (ref 26.5–33)
MCHC RBC AUTO-ENTMCNC: 33.4 G/DL (ref 31.5–36.5)
MCV RBC AUTO: 92 FL (ref 78–100)
PHOSPHATE SERPL-MCNC: 2 MG/DL (ref 2.5–4.5)
PLATELET # BLD AUTO: 129 10E9/L (ref 150–450)
POTASSIUM SERPL-SCNC: 3.2 MMOL/L (ref 3.4–5.3)
POTASSIUM SERPL-SCNC: 4.1 MMOL/L (ref 3.4–5.3)
RBC # BLD AUTO: 3.4 10E12/L (ref 3.8–5.2)
SODIUM SERPL-SCNC: 136 MMOL/L (ref 133–144)
WBC # BLD AUTO: 1.2 10E9/L (ref 4–11)

## 2018-03-20 PROCEDURE — 12000008 ZZH R&B INTERMEDIATE UMMC

## 2018-03-20 PROCEDURE — 25000132 ZZH RX MED GY IP 250 OP 250 PS 637: Mod: GY | Performed by: INTERNAL MEDICINE

## 2018-03-20 PROCEDURE — 80048 BASIC METABOLIC PNL TOTAL CA: CPT | Performed by: INTERNAL MEDICINE

## 2018-03-20 PROCEDURE — 25000128 H RX IP 250 OP 636: Performed by: PHYSICIAN ASSISTANT

## 2018-03-20 PROCEDURE — 25000128 H RX IP 250 OP 636: Performed by: FAMILY MEDICINE

## 2018-03-20 PROCEDURE — 93010 ELECTROCARDIOGRAM REPORT: CPT | Performed by: INTERNAL MEDICINE

## 2018-03-20 PROCEDURE — 84100 ASSAY OF PHOSPHORUS: CPT | Performed by: INTERNAL MEDICINE

## 2018-03-20 PROCEDURE — 25000128 H RX IP 250 OP 636: Performed by: INTERNAL MEDICINE

## 2018-03-20 PROCEDURE — 82962 GLUCOSE BLOOD TEST: CPT

## 2018-03-20 PROCEDURE — 83735 ASSAY OF MAGNESIUM: CPT | Performed by: INTERNAL MEDICINE

## 2018-03-20 PROCEDURE — 85027 COMPLETE CBC AUTOMATED: CPT | Performed by: INTERNAL MEDICINE

## 2018-03-20 PROCEDURE — 77386 ZZH IMRT TREATMENT DELIVERY, COMPLEX: CPT | Performed by: RADIOLOGY

## 2018-03-20 PROCEDURE — 36592 COLLECT BLOOD FROM PICC: CPT | Performed by: INTERNAL MEDICINE

## 2018-03-20 PROCEDURE — 25000125 ZZHC RX 250: Performed by: PHYSICIAN ASSISTANT

## 2018-03-20 PROCEDURE — 84132 ASSAY OF SERUM POTASSIUM: CPT | Performed by: INTERNAL MEDICINE

## 2018-03-20 PROCEDURE — A9270 NON-COVERED ITEM OR SERVICE: HCPCS | Mod: GY | Performed by: INTERNAL MEDICINE

## 2018-03-20 PROCEDURE — 85610 PROTHROMBIN TIME: CPT | Performed by: PHYSICIAN ASSISTANT

## 2018-03-20 PROCEDURE — A9270 NON-COVERED ITEM OR SERVICE: HCPCS | Mod: GY | Performed by: PHYSICIAN ASSISTANT

## 2018-03-20 PROCEDURE — 93005 ELECTROCARDIOGRAM TRACING: CPT

## 2018-03-20 PROCEDURE — 25800025 ZZH RX 258: Performed by: PHYSICIAN ASSISTANT

## 2018-03-20 PROCEDURE — 36592 COLLECT BLOOD FROM PICC: CPT | Performed by: PHYSICIAN ASSISTANT

## 2018-03-20 PROCEDURE — 85730 THROMBOPLASTIN TIME PARTIAL: CPT | Performed by: PHYSICIAN ASSISTANT

## 2018-03-20 PROCEDURE — 00000146 ZZHCL STATISTIC GLUCOSE BY METER IP

## 2018-03-20 PROCEDURE — 25000132 ZZH RX MED GY IP 250 OP 250 PS 637: Mod: GY | Performed by: PHYSICIAN ASSISTANT

## 2018-03-20 PROCEDURE — 99232 SBSQ HOSP IP/OBS MODERATE 35: CPT | Performed by: INTERNAL MEDICINE

## 2018-03-20 RX ORDER — METOCLOPRAMIDE 5 MG/1
5 TABLET ORAL EVERY 6 HOURS PRN
Status: DISCONTINUED | OUTPATIENT
Start: 2018-03-20 | End: 2018-03-20

## 2018-03-20 RX ORDER — ONDANSETRON 4 MG/1
4 TABLET, ORALLY DISINTEGRATING ORAL EVERY 6 HOURS PRN
Status: DISCONTINUED | OUTPATIENT
Start: 2018-03-20 | End: 2018-03-20

## 2018-03-20 RX ORDER — MAGNESIUM SULFATE HEPTAHYDRATE 40 MG/ML
4 INJECTION, SOLUTION INTRAVENOUS EVERY 4 HOURS PRN
Status: DISCONTINUED | OUTPATIENT
Start: 2018-03-20 | End: 2018-04-04 | Stop reason: HOSPADM

## 2018-03-20 RX ORDER — POTASSIUM CL/LIDO/0.9 % NACL 10MEQ/0.1L
10 INTRAVENOUS SOLUTION, PIGGYBACK (ML) INTRAVENOUS
Status: DISCONTINUED | OUTPATIENT
Start: 2018-03-20 | End: 2018-04-04 | Stop reason: HOSPADM

## 2018-03-20 RX ORDER — PROCHLORPERAZINE MALEATE 5 MG
5 TABLET ORAL EVERY 6 HOURS PRN
Status: DISCONTINUED | OUTPATIENT
Start: 2018-03-20 | End: 2018-03-20

## 2018-03-20 RX ORDER — PROCHLORPERAZINE MALEATE 5 MG
5-10 TABLET ORAL EVERY 6 HOURS PRN
Status: DISCONTINUED | OUTPATIENT
Start: 2018-03-20 | End: 2018-04-04 | Stop reason: HOSPADM

## 2018-03-20 RX ORDER — POTASSIUM CHLORIDE 1.5 G/1.58G
20-40 POWDER, FOR SOLUTION ORAL
Status: DISCONTINUED | OUTPATIENT
Start: 2018-03-20 | End: 2018-04-04 | Stop reason: HOSPADM

## 2018-03-20 RX ORDER — ONDANSETRON 2 MG/ML
8 INJECTION INTRAMUSCULAR; INTRAVENOUS EVERY 8 HOURS PRN
Status: DISCONTINUED | OUTPATIENT
Start: 2018-03-20 | End: 2018-04-04 | Stop reason: HOSPADM

## 2018-03-20 RX ORDER — ONDANSETRON 2 MG/ML
4 INJECTION INTRAMUSCULAR; INTRAVENOUS EVERY 6 HOURS PRN
Status: DISCONTINUED | OUTPATIENT
Start: 2018-03-20 | End: 2018-03-20

## 2018-03-20 RX ORDER — LOPERAMIDE HCL 2 MG
2 CAPSULE ORAL ONCE
Status: COMPLETED | OUTPATIENT
Start: 2018-03-20 | End: 2018-03-20

## 2018-03-20 RX ORDER — DIPHENOXYLATE HCL/ATROPINE 2.5-.025MG
1 TABLET ORAL 4 TIMES DAILY PRN
Status: DISCONTINUED | OUTPATIENT
Start: 2018-03-20 | End: 2018-03-21

## 2018-03-20 RX ORDER — POTASSIUM CHLORIDE 750 MG/1
20-40 TABLET, EXTENDED RELEASE ORAL
Status: DISCONTINUED | OUTPATIENT
Start: 2018-03-20 | End: 2018-04-04 | Stop reason: HOSPADM

## 2018-03-20 RX ORDER — METOCLOPRAMIDE HYDROCHLORIDE 5 MG/ML
5 INJECTION INTRAMUSCULAR; INTRAVENOUS EVERY 6 HOURS PRN
Status: DISCONTINUED | OUTPATIENT
Start: 2018-03-20 | End: 2018-03-20

## 2018-03-20 RX ORDER — DEXTROSE MONOHYDRATE, SODIUM CHLORIDE, AND POTASSIUM CHLORIDE 50; 1.49; 4.5 G/1000ML; G/1000ML; G/1000ML
INJECTION, SOLUTION INTRAVENOUS CONTINUOUS
Status: DISCONTINUED | OUTPATIENT
Start: 2018-03-20 | End: 2018-03-27

## 2018-03-20 RX ORDER — POTASSIUM CHLORIDE 29.8 MG/ML
20 INJECTION INTRAVENOUS
Status: DISCONTINUED | OUTPATIENT
Start: 2018-03-20 | End: 2018-04-04 | Stop reason: HOSPADM

## 2018-03-20 RX ORDER — POTASSIUM CHLORIDE 7.45 MG/ML
10 INJECTION INTRAVENOUS
Status: DISCONTINUED | OUTPATIENT
Start: 2018-03-20 | End: 2018-04-04 | Stop reason: HOSPADM

## 2018-03-20 RX ORDER — ONDANSETRON 8 MG/1
8 TABLET, ORALLY DISINTEGRATING ORAL EVERY 8 HOURS PRN
Status: DISCONTINUED | OUTPATIENT
Start: 2018-03-20 | End: 2018-04-04 | Stop reason: HOSPADM

## 2018-03-20 RX ORDER — METHOCARBAMOL 500 MG/1
500 TABLET, FILM COATED ORAL 4 TIMES DAILY
Status: DISCONTINUED | OUTPATIENT
Start: 2018-03-20 | End: 2018-03-24

## 2018-03-20 RX ORDER — PHYTONADIONE 5 MG/1
5 TABLET ORAL DAILY
Status: COMPLETED | OUTPATIENT
Start: 2018-03-20 | End: 2018-03-22

## 2018-03-20 RX ORDER — PROCHLORPERAZINE 25 MG
12.5 SUPPOSITORY, RECTAL RECTAL EVERY 12 HOURS PRN
Status: DISCONTINUED | OUTPATIENT
Start: 2018-03-20 | End: 2018-03-20

## 2018-03-20 RX ADMIN — POTASSIUM CHLORIDE, DEXTROSE MONOHYDRATE AND SODIUM CHLORIDE: 150; 5; 450 INJECTION, SOLUTION INTRAVENOUS at 09:53

## 2018-03-20 RX ADMIN — LOPERAMIDE HYDROCHLORIDE 2 MG: 2 CAPSULE ORAL at 09:44

## 2018-03-20 RX ADMIN — ENOXAPARIN SODIUM 40 MG: 40 INJECTION SUBCUTANEOUS at 20:06

## 2018-03-20 RX ADMIN — DIPHENOXYLATE HYDROCHLORIDE AND ATROPINE SULFATE 1 TABLET: 2.5; .025 TABLET ORAL at 20:06

## 2018-03-20 RX ADMIN — MAGNESIUM SULFATE HEPTAHYDRATE 4 G: 40 INJECTION, SOLUTION INTRAVENOUS at 08:16

## 2018-03-20 RX ADMIN — METHOCARBAMOL 500 MG: 500 TABLET ORAL at 20:07

## 2018-03-20 RX ADMIN — HYDROCORTISONE: 25 CREAM TOPICAL at 20:14

## 2018-03-20 RX ADMIN — LOPERAMIDE HYDROCHLORIDE 2 MG: 2 CAPSULE ORAL at 08:20

## 2018-03-20 RX ADMIN — PHYTONADIONE 5 MG: 5 TABLET ORAL at 15:10

## 2018-03-20 RX ADMIN — POTASSIUM CHLORIDE 20 MEQ: 29.8 INJECTION, SOLUTION INTRAVENOUS at 11:44

## 2018-03-20 RX ADMIN — HYDROCORTISONE: 25 CREAM TOPICAL at 08:29

## 2018-03-20 RX ADMIN — ACETAMINOPHEN 650 MG: 325 TABLET, FILM COATED ORAL at 08:19

## 2018-03-20 RX ADMIN — POTASSIUM PHOSPHATE, MONOBASIC AND POTASSIUM PHOSPHATE, DIBASIC 15 MMOL: 224; 236 INJECTION, SOLUTION INTRAVENOUS at 15:07

## 2018-03-20 RX ADMIN — DIPHENHYDRAMINE HYDROCHLORIDE AND LIDOCAINE HYDROCHLORIDE AND ALUMINUM HYDROXIDE AND MAGNESIUM HYDRO 5 ML: KIT at 20:12

## 2018-03-20 RX ADMIN — LIDOCAINE AND PRILOCAINE: 25; 25 CREAM TOPICAL at 00:12

## 2018-03-20 RX ADMIN — LIDOCAINE AND PRILOCAINE: 25; 25 CREAM TOPICAL at 04:36

## 2018-03-20 RX ADMIN — SODIUM CHLORIDE 1000 ML: 9 INJECTION, SOLUTION INTRAVENOUS at 02:02

## 2018-03-20 RX ADMIN — ACETAMINOPHEN 650 MG: 325 TABLET, FILM COATED ORAL at 18:33

## 2018-03-20 RX ADMIN — METHOCARBAMOL 500 MG: 500 TABLET ORAL at 15:10

## 2018-03-20 RX ADMIN — POTASSIUM CHLORIDE 20 MEQ: 29.8 INJECTION, SOLUTION INTRAVENOUS at 13:18

## 2018-03-20 RX ADMIN — DICLOFENAC SODIUM 2 G: 10 GEL TOPICAL at 18:35

## 2018-03-20 RX ADMIN — LOPERAMIDE HYDROCHLORIDE 2 MG: 2 CAPSULE ORAL at 18:33

## 2018-03-20 RX ADMIN — Medication 2 G: at 20:03

## 2018-03-20 RX ADMIN — ONDANSETRON 4 MG: 2 INJECTION INTRAMUSCULAR; INTRAVENOUS at 08:20

## 2018-03-20 RX ADMIN — ACETAMINOPHEN 650 MG: 325 TABLET, FILM COATED ORAL at 13:18

## 2018-03-20 RX ADMIN — POTASSIUM CHLORIDE, DEXTROSE MONOHYDRATE AND SODIUM CHLORIDE: 150; 5; 450 INJECTION, SOLUTION INTRAVENOUS at 20:03

## 2018-03-20 ASSESSMENT — PAIN DESCRIPTION - DESCRIPTORS
DESCRIPTORS: CONSTANT;ACHING
DESCRIPTORS: CONSTANT;ACHING
DESCRIPTORS: CONSTANT;SPASM;SHARP
DESCRIPTORS: CONSTANT;ACHING

## 2018-03-20 NOTE — PLAN OF CARE
Problem: Patient Care Overview  Goal: Plan of Care/Patient Progress Review  Outcome: No Change  AF, VSS.  Admitted form ER after fall at home and diarrhea, probable dehydration.  Chemo 5FU about a week ago and currently having radiation therapy for SCC of anus.  Diarrhea explosive, incontinent. Stool cx sent:  C.Diff negative, viral, bacterial results pending.  Enteric precautions. Skin raw/denuded at inner buttock and labia from radiation therapy and diarrhea. Lotion cleanser and soft wipes, lidocaine gel to area.  Aquaphor to raw skin since getting radiation therapy. UA, shows possible bladder infection, to notify Dr. Radhames bryan, with diarrhea x 2 afterwards. Right rib pain and left shoulder pain with turning, tylenol given.  Brought home medications with her, refused sending to security.  Sister unable to come tonight to get them.  Please follow up.  Fall precautions, call light in reach.

## 2018-03-20 NOTE — PLAN OF CARE
Problem: Patient Care Overview  Goal: Plan of Care/Patient Progress Review  [id number]: Pt A&O, VSS on RA, and up to bedside commode with A1-2.  Endorsing pain on R side of back and in periarea.  Denied need for pain medication, hot packs given, also received Emla cream x 2 overnight-reports good relief.  Periarea red, raw, and swollen due to radiation.  Pt receives daily radiation; appointment scheduled for 0900.  Having frequent episodes of diarrhea overnight; intermittently incontinent.  C diff and enteric panel negative.  Voiding without difficulties.  Port infusing NS at 125 mL/hr.  Home medications sent to security.  Pt able to call and make needs known.  Will continue to monitor and follow POC.

## 2018-03-20 NOTE — PROGRESS NOTES
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     #Intractable diarrhea  #Nausea  #Dehydration  #Electrolyte derangements  -Suspect nausea, diarrhea secondary to mucositis from recent chemotherapy (5-FU) and XRT.   -C.diff and enteric panel negative.  -Imodium and Lomotil available PRN diarrhea. Antiemetics also available (Zofran, Compazine).  -IV fluids at 125ml/hr and electrolyte replacement PRN     #Pancytopenia  -Secondary to underlying disease, recent chemo/XRT  -Transfuse for Hgb <8, PLT <10K    #Coagulopathy  -INR 1.68, PTT 38. May be related to malnutrition. Decreased from 2.03 yesterday s/p 2 doses of vitamin K  -Will dose with additional vitamin K, 5mg daily x 3 days.    #Weakness  #Fall at home  #Fractured R 8th rib  -Suspect weakness, falls secondary to above issues. Was also just started on tincture of opium for diarrhea as outpatient, unclear if experiencing side effects d/t this.  -No history of head trauma or LOC. Alert and oriented today. Will closely monitor and obtain head CT/MRI if needed.   -Fall precautions  -PT/OT  -UA dirty, culture pending      #Stage IIIA (T2 N1) SCC of anus  -Followed by Dr. Ray  -S/p 1 cycle 5-FU + mitomycin followed by 1 cycle single-agent 5-FU (d/t toxicity and age) with concurrent radiation.   -She will complete final fraction of XRT on 3/23. Will continue XRT while inpatient.  -Supportive rectal cares ordered    #FEN  -MIVF with D5 1/2NS and 20 mEq K+ @ 125ml/hr  -Replace lytes PRN per protocol  -RDAT    #Prophy  -Lovenox 40mg daily, follow coags    #Pain Assessment:  Current Pain Score 3/20/2018 3/20/2018 3/20/2018   Patient currently in pain? yes yes yes   Pain  score (0-10) - - -   Pain location Back Back Generalized   Pain descriptors Constant;Aching Constant;Aching Constant   - Elizabeth is experiencing pain due to new rib fracture. Pain management was discussed and the plan was created in a collaborative fashion.  Elizabeth's response to the current recommendations: engaged  compliant  - Pharmacologic adjuvants: Acetaminophen, Topical NSAIDS and compounded ketamine/gabapentin/lidocaine cream  - Non-pharmacologic adjuvants: Heat    Dispo: Anticipate d/c home in the next 1-2 days pending resolution of symptoms. PT/OT consulted for assistance with discharge planning.     Patient and plan of care discussed with staff attending, Dr. Howe.     Mei Ramon PA-C  Hematology/Oncology  [phone]    Interval History   Elizabeth reports feeling a bit better today. She notes improvement in weakness and has a bit more energy. Hesitant to continue with radiation, starting to feel hopeless that it will get rid of her cancer. She notes continued pain to her R side that radiates into her shoulder, also feels as if she is having some muscle spasm. She denies fevers, sweats or chills. No chest pain or SOB. Still with nausea and diarrhea, but no vomiting.    Physical Exam   Temp: 97.3  F (36.3  C) Temp src: Oral BP: 101/50 Pulse: 80 Heart Rate: 92 Resp: 16 SpO2: 95 % O2 Device: None (Room air)    Vitals:    03/20/18 0744   Weight: 57.6 kg (126 lb 14.4 oz)     Vital Signs with Ranges  Temp:  [95.4  F (35.2  C)-97.7  F (36.5  C)] 97.3  F (36.3  C)  Pulse:  [80] 80  Heart Rate:  [79-92] 92  Resp:  [8-21] 16  BP: (101-124)/(45-70) 101/50  SpO2:  [95 %-100 %] 95 %  I/O last 3 completed shifts:  In: 1779.17 [P.O.:150; I.V.:1629.17]  Out: 275 [Urine:275]    Constitutional: Elderly female seen sitting up in bed, no acute distress, resting comfortably.  Eyes: Lids and lashes normal, pupils equal, round and reactive to light, extra ocular muscles intact, sclera clear, conjunctiva normal.  ENT:  Normocephalic, without obvious abnormality, atraumatic.  Respiratory: No increased work of breathing, good air exchange, clear to auscultation bilaterally, no crackles or wheezing.  Cardiovascular: Regular rate and rhythm and no murmur noted.  GI: + bowel sounds, soft, non-tender, non-distended.  Musculoskeletal: No edema B/L LE.  Neurologic: No focal deficits.  Neuropsychiatric: Calm, normal eye contact, alert, normal affect, oriented to self, place, time and situation, memory for past and recent events intact and thought process normal.    Medications     dextrose 5% and 0.45% NaCl + KCl 20 mEq/L 100 mL/hr at 03/20/18 0953     - MEDICATION INSTRUCTIONS -         sodium chloride (PF)  3 mL Intracatheter Q8H     calcium-vitamin D  1 tablet Oral Daily     hydrocortisone   Rectal BID     vitamin B complex with vitamin C  1 tablet Oral Daily     enoxaparin  40 mg Subcutaneous Q24H     phytonadione  5 mg Oral Daily       Data   Results for orders placed or performed during the hospital encounter of 03/19/18 (from the past 24 hour(s))   Enteric Bacteria and Virus Panel by JACQUELIN Stool   Result Value Ref Range    Campylobacter group by JACQUELIN Not Detected NDET^Not Detected    Salmonella species by JACQUELIN Not Detected NDET^Not Detected    Shigella species by JACQUELIN Not Detected NDET^Not Detected    Vibrio group by JACQUELIN Not Detected NDET^Not Detected    Rotavirus A by JACQUELIN Not Detected NDET^Not Detected    Shiga toxin 1 gene by JACQUELIN Not Detected NDET^Not Detected    Shiga toxin 2 gene by JACQUELIN Not Detected NDET^Not Detected    Norovirus I and II by JACQUELIN Not Detected NDET^Not Detected    Yersinia enterocolitica by JACQUELIN Not Detected NDET^Not Detected    Enteric pathogen comment       Testing performed by multiplexed, qualitative PCR using the [employer] Enteric   Pathogens Nucleic Acid Test. Results should not be used as the sole basis for diagnosis,   treatment, or other patient management decisions.     Clostridium difficile toxin B  PCR   Result Value Ref Range    Specimen Description Feces     C Diff Toxin B PCR Negative NEG^Negative   Platelet count   Result Value Ref Range    Platelet Count 145 (L) 150 - 450 10e9/L   Creatinine   Result Value Ref Range    Creatinine 0.86 0.52 - 1.04 mg/dL    GFR Estimate 63 >60 mL/min/1.7m2    GFR Estimate If Black 77 >60 mL/min/1.7m2   UA with Microscopic reflex to Culture   Result Value Ref Range    Color Urine Yellow     Appearance Urine Slightly Cloudy     Glucose Urine Negative NEG^Negative mg/dL    Bilirubin Urine Negative NEG^Negative    Ketones Urine 40 (A) NEG^Negative mg/dL    Specific Gravity Urine 1.016 1.003 - 1.035    Blood Urine Small (A) NEG^Negative    pH Urine 5.5 5.0 - 7.0 pH    Protein Albumin Urine 30 (A) NEG^Negative mg/dL    Urobilinogen mg/dL Normal 0.0 - 2.0 mg/dL    Nitrite Urine Negative NEG^Negative    Leukocyte Esterase Urine Moderate (A) NEG^Negative    Source Clean catch urine     WBC Urine 26 (H) 0 - 5 /HPF    RBC Urine 3 (H) 0 - 2 /HPF    WBC Clumps Present (A) NEG^Negative /HPF    Bacteria Urine Few (A) NEG^Negative /HPF    Yeast Urine Canceled, Test credited (A) NEG^Negative /HPF    Squamous Epithelial /HPF Urine <1 0 - 1 /HPF    Transitional Epi <1 0 - 1 /HPF    Mucous Urine Present (A) NEG^Negative /LPF   Urine Culture Aerobic Bacterial   Result Value Ref Range    Specimen Description Midstream Urine     Special Requests Specimen received in preservative     Culture Micro Culture in progress    CBC with platelets   Result Value Ref Range    WBC 1.2 (L) 4.0 - 11.0 10e9/L    RBC Count 3.40 (L) 3.8 - 5.2 10e12/L    Hemoglobin 10.4 (L) 11.7 - 15.7 g/dL    Hematocrit 31.1 (L) 35.0 - 47.0 %    MCV 92 78 - 100 fl    MCH 30.6 26.5 - 33.0 pg    MCHC 33.4 31.5 - 36.5 g/dL    RDW 14.7 10.0 - 15.0 %    Platelet Count 129 (L) 150 - 450 10e9/L   Basic metabolic panel   Result Value Ref Range    Sodium 136 133 - 144 mmol/L    Potassium 3.2 (L) 3.4 - 5.3 mmol/L    Chloride 104 94 - 109  mmol/L    Carbon Dioxide 24 20 - 32 mmol/L    Anion Gap 8 3 - 14 mmol/L    Glucose 70 70 - 99 mg/dL    Urea Nitrogen 20 7 - 30 mg/dL    Creatinine 0.68 0.52 - 1.04 mg/dL    GFR Estimate 84 >60 mL/min/1.7m2    GFR Estimate If Black >90 >60 mL/min/1.7m2    Calcium 7.5 (L) 8.5 - 10.1 mg/dL   Magnesium   Result Value Ref Range    Magnesium 1.4 (L) 1.6 - 2.3 mg/dL   Phosphorus   Result Value Ref Range    Phosphorus 2.0 (L) 2.5 - 4.5 mg/dL   EKG 12-lead, complete   Result Value Ref Range    Interpretation ECG Click View Image link to view waveform and result    INR   Result Value Ref Range    INR 1.68 (H) 0.86 - 1.14   Partial thromboplastin time   Result Value Ref Range    PTT 38 (H) 22 - 37 sec   Glucose by meter   Result Value Ref Range    Glucose 68 (L) 70 - 99 mg/dL   Glucose by meter   Result Value Ref Range    Glucose 77 70 - 99 mg/dL   Glucose by meter   Result Value Ref Range    Glucose 89 70 - 99 mg/dL   Glucose by meter   Result Value Ref Range    Glucose 118 (H) 70 - 99 mg/dL

## 2018-03-21 ENCOUNTER — APPOINTMENT (OUTPATIENT)
Dept: PHYSICAL THERAPY | Facility: CLINIC | Age: 78
DRG: 393 | End: 2018-03-21
Payer: MEDICARE

## 2018-03-21 ENCOUNTER — APPOINTMENT (OUTPATIENT)
Dept: RADIATION ONCOLOGY | Facility: CLINIC | Age: 78
End: 2018-03-21
Attending: RADIOLOGY
Payer: MEDICARE

## 2018-03-21 LAB
ALBUMIN SERPL-MCNC: 1.3 G/DL (ref 3.4–5)
ALP SERPL-CCNC: 54 U/L (ref 40–150)
ALT SERPL W P-5'-P-CCNC: 21 U/L (ref 0–50)
ANION GAP SERPL CALCULATED.3IONS-SCNC: 8 MMOL/L (ref 3–14)
APTT PPP: 36 SEC (ref 22–37)
AST SERPL W P-5'-P-CCNC: 19 U/L (ref 0–45)
BASOPHILS # BLD AUTO: 0 10E9/L (ref 0–0.2)
BASOPHILS NFR BLD AUTO: 0 %
BILIRUB SERPL-MCNC: 0.8 MG/DL (ref 0.2–1.3)
BUN SERPL-MCNC: 9 MG/DL (ref 7–30)
CALCIUM SERPL-MCNC: 7.2 MG/DL (ref 8.5–10.1)
CHLORIDE SERPL-SCNC: 105 MMOL/L (ref 94–109)
CO2 SERPL-SCNC: 22 MMOL/L (ref 20–32)
CREAT SERPL-MCNC: 0.54 MG/DL (ref 0.52–1.04)
DIFFERENTIAL METHOD BLD: ABNORMAL
EOSINOPHIL # BLD AUTO: 0 10E9/L (ref 0–0.7)
EOSINOPHIL NFR BLD AUTO: 1 %
ERYTHROCYTE [DISTWIDTH] IN BLOOD BY AUTOMATED COUNT: 14.9 % (ref 10–15)
GFR SERPL CREATININE-BSD FRML MDRD: >90 ML/MIN/1.7M2
GLUCOSE SERPL-MCNC: 123 MG/DL (ref 70–99)
HCT VFR BLD AUTO: 32 % (ref 35–47)
HGB BLD-MCNC: 10.6 G/DL (ref 11.7–15.7)
IMM GRANULOCYTES # BLD: 0 10E9/L (ref 0–0.4)
IMM GRANULOCYTES NFR BLD: 3 %
INR PPP: 1.39 (ref 0.86–1.14)
INTERPRETATION ECG - MUSE: NORMAL
LYMPHOCYTES # BLD AUTO: 0 10E9/L (ref 0.8–5.3)
LYMPHOCYTES NFR BLD AUTO: 1 %
MAGNESIUM SERPL-MCNC: 1.8 MG/DL (ref 1.6–2.3)
MCH RBC QN AUTO: 30.5 PG (ref 26.5–33)
MCHC RBC AUTO-ENTMCNC: 33.1 G/DL (ref 31.5–36.5)
MCV RBC AUTO: 92 FL (ref 78–100)
MONOCYTES # BLD AUTO: 0.2 10E9/L (ref 0–1.3)
MONOCYTES NFR BLD AUTO: 21.8 %
NEUTROPHILS # BLD AUTO: 0.7 10E9/L (ref 1.6–8.3)
NEUTROPHILS NFR BLD AUTO: 73.2 %
NRBC # BLD AUTO: 0 10*3/UL
NRBC BLD AUTO-RTO: 2 /100
PHOSPHATE SERPL-MCNC: 1.1 MG/DL (ref 2.5–4.5)
PHOSPHATE SERPL-MCNC: 1.8 MG/DL (ref 2.5–4.5)
PLATELET # BLD AUTO: 129 10E9/L (ref 150–450)
PLATELET # BLD EST: ABNORMAL 10*3/UL
POTASSIUM SERPL-SCNC: 3.8 MMOL/L (ref 3.4–5.3)
POTASSIUM SERPL-SCNC: 3.8 MMOL/L (ref 3.4–5.3)
PROT SERPL-MCNC: 3.7 G/DL (ref 6.8–8.8)
RBC # BLD AUTO: 3.47 10E12/L (ref 3.8–5.2)
SODIUM SERPL-SCNC: 136 MMOL/L (ref 133–144)
WBC # BLD AUTO: 1 10E9/L (ref 4–11)

## 2018-03-21 PROCEDURE — 97116 GAIT TRAINING THERAPY: CPT | Mod: GP | Performed by: PHYSICAL THERAPIST

## 2018-03-21 PROCEDURE — 25000132 ZZH RX MED GY IP 250 OP 250 PS 637: Mod: GY | Performed by: PHYSICIAN ASSISTANT

## 2018-03-21 PROCEDURE — 25000128 H RX IP 250 OP 636: Performed by: PHYSICIAN ASSISTANT

## 2018-03-21 PROCEDURE — 12000008 ZZH R&B INTERMEDIATE UMMC

## 2018-03-21 PROCEDURE — 25000128 H RX IP 250 OP 636: Performed by: INTERNAL MEDICINE

## 2018-03-21 PROCEDURE — 36592 COLLECT BLOOD FROM PICC: CPT | Performed by: PHYSICIAN ASSISTANT

## 2018-03-21 PROCEDURE — 40000193 ZZH STATISTIC PT WARD VISIT: Performed by: PHYSICAL THERAPIST

## 2018-03-21 PROCEDURE — 85730 THROMBOPLASTIN TIME PARTIAL: CPT | Performed by: PHYSICIAN ASSISTANT

## 2018-03-21 PROCEDURE — 84100 ASSAY OF PHOSPHORUS: CPT | Performed by: INTERNAL MEDICINE

## 2018-03-21 PROCEDURE — G0463 HOSPITAL OUTPT CLINIC VISIT: HCPCS

## 2018-03-21 PROCEDURE — A9270 NON-COVERED ITEM OR SERVICE: HCPCS | Mod: GY | Performed by: PHYSICIAN ASSISTANT

## 2018-03-21 PROCEDURE — 97161 PT EVAL LOW COMPLEX 20 MIN: CPT | Mod: GP | Performed by: PHYSICAL THERAPIST

## 2018-03-21 PROCEDURE — 83735 ASSAY OF MAGNESIUM: CPT | Performed by: PHYSICIAN ASSISTANT

## 2018-03-21 PROCEDURE — A9270 NON-COVERED ITEM OR SERVICE: HCPCS | Mod: GY | Performed by: INTERNAL MEDICINE

## 2018-03-21 PROCEDURE — 80053 COMPREHEN METABOLIC PANEL: CPT | Performed by: PHYSICIAN ASSISTANT

## 2018-03-21 PROCEDURE — 99232 SBSQ HOSP IP/OBS MODERATE 35: CPT | Performed by: INTERNAL MEDICINE

## 2018-03-21 PROCEDURE — 36592 COLLECT BLOOD FROM PICC: CPT | Performed by: INTERNAL MEDICINE

## 2018-03-21 PROCEDURE — 25800025 ZZH RX 258: Performed by: PHYSICIAN ASSISTANT

## 2018-03-21 PROCEDURE — 97530 THERAPEUTIC ACTIVITIES: CPT | Mod: GP | Performed by: PHYSICAL THERAPIST

## 2018-03-21 PROCEDURE — 85025 COMPLETE CBC W/AUTO DIFF WBC: CPT | Performed by: PHYSICIAN ASSISTANT

## 2018-03-21 PROCEDURE — 25000132 ZZH RX MED GY IP 250 OP 250 PS 637: Mod: GY | Performed by: INTERNAL MEDICINE

## 2018-03-21 PROCEDURE — 25000125 ZZHC RX 250: Performed by: PHYSICIAN ASSISTANT

## 2018-03-21 PROCEDURE — 77386 ZZH IMRT TREATMENT DELIVERY, COMPLEX: CPT | Performed by: RADIOLOGY

## 2018-03-21 PROCEDURE — 84100 ASSAY OF PHOSPHORUS: CPT | Performed by: PHYSICIAN ASSISTANT

## 2018-03-21 PROCEDURE — 85610 PROTHROMBIN TIME: CPT | Performed by: PHYSICIAN ASSISTANT

## 2018-03-21 RX ORDER — NALOXONE HYDROCHLORIDE 0.4 MG/ML
.1-.4 INJECTION, SOLUTION INTRAMUSCULAR; INTRAVENOUS; SUBCUTANEOUS
Status: DISCONTINUED | OUTPATIENT
Start: 2018-03-21 | End: 2018-04-04 | Stop reason: HOSPADM

## 2018-03-21 RX ORDER — TRAMADOL HYDROCHLORIDE 50 MG/1
50 TABLET ORAL EVERY 6 HOURS PRN
Status: DISCONTINUED | OUTPATIENT
Start: 2018-03-21 | End: 2018-03-22

## 2018-03-21 RX ORDER — CIPROFLOXACIN 500 MG/1
500 TABLET, FILM COATED ORAL EVERY 12 HOURS SCHEDULED
Status: COMPLETED | OUTPATIENT
Start: 2018-03-21 | End: 2018-03-30

## 2018-03-21 RX ORDER — DIPHENOXYLATE HCL/ATROPINE 2.5-.025MG
1 TABLET ORAL 4 TIMES DAILY
Status: DISCONTINUED | OUTPATIENT
Start: 2018-03-21 | End: 2018-03-28

## 2018-03-21 RX ADMIN — METHOCARBAMOL 500 MG: 500 TABLET ORAL at 21:02

## 2018-03-21 RX ADMIN — Medication 2 G: at 10:46

## 2018-03-21 RX ADMIN — PHYTONADIONE 5 MG: 5 TABLET ORAL at 10:45

## 2018-03-21 RX ADMIN — METHOCARBAMOL 500 MG: 500 TABLET ORAL at 12:36

## 2018-03-21 RX ADMIN — ACETAMINOPHEN 650 MG: 325 TABLET, FILM COATED ORAL at 12:37

## 2018-03-21 RX ADMIN — LOPERAMIDE HYDROCHLORIDE 2 MG: 2 CAPSULE ORAL at 12:36

## 2018-03-21 RX ADMIN — DIPHENOXYLATE HYDROCHLORIDE AND ATROPINE SULFATE 1 TABLET: 2.5; .025 TABLET ORAL at 14:32

## 2018-03-21 RX ADMIN — ACETAMINOPHEN 650 MG: 325 TABLET, FILM COATED ORAL at 21:02

## 2018-03-21 RX ADMIN — DICLOFENAC SODIUM 2 G: 10 GEL TOPICAL at 10:47

## 2018-03-21 RX ADMIN — LOPERAMIDE HYDROCHLORIDE 2 MG: 2 CAPSULE ORAL at 00:22

## 2018-03-21 RX ADMIN — ENOXAPARIN SODIUM 40 MG: 40 INJECTION SUBCUTANEOUS at 21:01

## 2018-03-21 RX ADMIN — DICLOFENAC SODIUM 2 G: 10 GEL TOPICAL at 16:30

## 2018-03-21 RX ADMIN — POTASSIUM CHLORIDE 20 MEQ: 750 TABLET, EXTENDED RELEASE ORAL at 10:44

## 2018-03-21 RX ADMIN — CIPROFLOXACIN HYDROCHLORIDE 500 MG: 500 TABLET, FILM COATED ORAL at 10:43

## 2018-03-21 RX ADMIN — ONDANSETRON 8 MG: 2 INJECTION INTRAMUSCULAR; INTRAVENOUS at 08:30

## 2018-03-21 RX ADMIN — DIPHENOXYLATE HYDROCHLORIDE AND ATROPINE SULFATE 1 TABLET: 2.5; .025 TABLET ORAL at 10:50

## 2018-03-21 RX ADMIN — HYDROCORTISONE: 25 CREAM TOPICAL at 21:03

## 2018-03-21 RX ADMIN — METHOCARBAMOL 500 MG: 500 TABLET ORAL at 08:29

## 2018-03-21 RX ADMIN — CIPROFLOXACIN HYDROCHLORIDE 500 MG: 500 TABLET, FILM COATED ORAL at 21:02

## 2018-03-21 RX ADMIN — ACETAMINOPHEN 650 MG: 325 TABLET, FILM COATED ORAL at 08:30

## 2018-03-21 RX ADMIN — HYDROCORTISONE: 25 CREAM TOPICAL at 10:46

## 2018-03-21 RX ADMIN — ACETAMINOPHEN 650 MG: 325 TABLET, FILM COATED ORAL at 16:30

## 2018-03-21 RX ADMIN — DIPHENOXYLATE HYDROCHLORIDE AND ATROPINE SULFATE 1 TABLET: 2.5; .025 TABLET ORAL at 21:02

## 2018-03-21 RX ADMIN — POTASSIUM PHOSPHATE, MONOBASIC AND POTASSIUM PHOSPHATE, DIBASIC 20 MMOL: 224; 236 INJECTION, SOLUTION INTRAVENOUS at 12:39

## 2018-03-21 RX ADMIN — ACETAMINOPHEN 650 MG: 325 TABLET, FILM COATED ORAL at 00:28

## 2018-03-21 RX ADMIN — POTASSIUM CHLORIDE, DEXTROSE MONOHYDRATE AND SODIUM CHLORIDE: 150; 5; 450 INJECTION, SOLUTION INTRAVENOUS at 16:29

## 2018-03-21 RX ADMIN — METHOCARBAMOL 500 MG: 500 TABLET ORAL at 16:30

## 2018-03-21 RX ADMIN — DIPHENOXYLATE HYDROCHLORIDE AND ATROPINE SULFATE 1 TABLET: 2.5; .025 TABLET ORAL at 16:30

## 2018-03-21 RX ADMIN — DIPHENOXYLATE HYDROCHLORIDE AND ATROPINE SULFATE 1 TABLET: 2.5; .025 TABLET ORAL at 03:28

## 2018-03-21 ASSESSMENT — PAIN DESCRIPTION - DESCRIPTORS
DESCRIPTORS: SHARP;ACHING;CONSTANT
DESCRIPTORS: SHARP;CONSTANT
DESCRIPTORS: CONSTANT;SHARP;SPASM
DESCRIPTORS: SHARP;CONSTANT;ACHING
DESCRIPTORS: SHARP;ACHING;CONSTANT
DESCRIPTORS: CONSTANT;SHARP;SPASM
DESCRIPTORS: CONSTANT
DESCRIPTORS: SHARP;CONSTANT

## 2018-03-21 NOTE — PROGRESS NOTES
03/21/18 1620   Quick Adds   Type of Visit Initial PT Evaluation   Living Environment   Lives With alone   Living Arrangements apartment   Home Accessibility bed and bath on same level;tub/shower is not walk in;stairs to enter home   Number of Stairs to Enter Home 12   Number of Stairs Within Home 0   Stair Railings at Home outside, present at both sides   Transportation Available family or friend will provide   Living Environment Comment Pt lives in apartment with all needs on same level once inside. Pt has full flight of stairs to enter unit, B HR present. Pt has tub-shower, no grab bars however reports using the towel rack for steadying.    Self-Care   Usual Activity Tolerance moderate   Current Activity Tolerance fair   Equipment Currently Used at Home (4WW vs SPC at home)   Activity/Exercise/Self-Care Comment Pt lethargic and intermittently tangential with conversation, VCs to keep EO. Reports living alone, IND-mod I with mobility and ADLs. Pt would soak in tub, sitting down, and climbed stairs without physical assist. Pt has assist from friends for driving. Pt previously working as .   Functional Level Prior   Ambulation 1-->assistive equipment   Transferring 0-->independent   Toileting 0-->independent   Bathing 0-->independent   Dressing 0-->independent   Eating 0-->independent   Communication 0-->understands/communicates without difficulty   Swallowing 0-->swallows foods/liquids without difficulty   Cognition 1 - attention or memory deficits   Fall history within last six months yes   Number of times patient has fallen within last six months 2   Which of the above functional risks had a recent onset or change? ambulation;transferring;toileting;bathing;fall history   Prior Functional Level Comment Per pt report, IND-mod I with household mobility at baseline.   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 \"   Precautions/Limitations immunosuppressed;fall precautions   General Observations Pt resting in bed, agreeable to PT. RN OK'd activity.   General Info Comments Activity orders: amb with assist   Cognitive Status Examination   Orientation orientation to person, place and time   Level of Consciousness lethargic/somnolent  (improved alertness with mobility)   Follows Commands and Answers Questions 100% of the time   Personal Safety and Judgment intact   Pain Assessment   Patient Currently in Pain Yes, see Vital Sign flowsheet   Integumentary/Edema   Integumentary/Edema Comments Pitting edema at BLE, this may be baseline as pt with compression garments at feel/calf hermelinda.   Posture    Posture Kyphosis;Protracted shoulders;Forward head position   Range of Motion (ROM)   ROM Comment WFL per functional mobility assessment. Guarding RUE at side d/t rib pain.   Strength   Strength Comments Not formally tested d/t pain, however pt generally deconditioned. Strength at BLE is functional though declined, with need for UE support with STS. Pt guarding RLE at ribcage, able to relax at side with VCs.   Bed Mobility   Bed Mobility Comments mod A for supine<>sit   Transfer Skills   Transfer Comments CGA sit<>stand with LUE supported   Gait   Gait Comments Pt stepped bed>BSC with min A and 1 UE supported at FWW, pt declining RUE support d/t fear of WB through extremity resulting in increased R rib pain.   Balance   Balance Comments Fair. Pt sits unsupported at EOB with SBA. Pt amb with 1 UE support and min A.   General Therapy Interventions " "  Planned Therapy Interventions balance training;bed mobility training;gait training;neuromuscular re-education;strengthening;transfer training;progressive activity/exercise;risk factor education;home program guidelines;ROM   Clinical Impression   Criteria for Skilled Therapeutic Intervention yes, treatment indicated   PT Diagnosis Impaired functional mobility   Influenced by the following impairments decreased strength, decreased endurance, impaired balance, pain   Functional limitations due to impairments bed mobility, transfers, amb, stair negotiation   Clinical Presentation Evolving/Changing   Clinical Presentation Rationale Pt eager to work with therpay, however limited significantly d/t pain. Pt moves slowy, requires max encouragement and VCs, and increased assist d/t prolonged immobility. Ongoing radiation treatments.   Clinical Decision Making (Complexity) Low complexity   Therapy Frequency` other (see comments)  (6x/wk)   Predicted Duration of Therapy Intervention (days/wks) 1 week   Anticipated Discharge Disposition Transitional Care Facility   Risk & Benefits of therapy have been explained Yes   Patient, Family & other staff in agreement with plan of care Yes   [facility] [employer] TM \"6 Clicks\"   2016, Trustees of [facility], under license to [employer].  All rights reserved.   6 Clicks Short Forms Basic Mobility Inpatient Short Form   [facility] AM-PAC  \"6 Clicks\" V.2 Basic Mobility Inpatient Short Form   1. Turning from your back to your side while in a flat bed without using bedrails? 3 - A Little   2. Moving from lying on your back to sitting on the side of a flat bed without using bedrails? 2 - A Lot   3. Moving to and from a bed to a chair (including a wheelchair)? 3 - A Little   4. Standing up from a chair using your arms (e.g., wheelchair, or bedside chair)? 3 - A Little   5. To walk in hospital room? 3 - A Little   6. Climbing 3-5 steps with a railing? 2 - A Lot   Basic " Mobility Raw Score (Score out of 24.Lower scores equate to lower levels of function) 16   Total Evaluation Time   Total Evaluation Time (Minutes) 12

## 2018-03-21 NOTE — PLAN OF CARE
Problem: Patient Care Overview  Goal: Plan of Care/Patient Progress Review  Discharge Planner PT   Patient plan for discharge: TCU  Current status: Pt tx supine<>sit at EOB with mod A and VCs for sequencing. Pt tx sit<>stand with CGA and UE support. Pt amb ~100 ft in halls with min A and LUE supported (progressed from FWW>HHA). Pt moves fairly well with VCs and increased time, receptive to receiving assistance when asked/explained prior.   Barriers to return to prior living situation: Level of assist. Stairs to enter apartment; no consistent assist available at home.  Recommendations for discharge: TCU  Rationale for recommendations: Pt would benefit from rehab stay to progress independence and endurance with functional mobility prior to return to home. Pt reports that she may stay at sister's house for a bit before returning to apartment, however this requires significant stair negotiation, too.        Entered by: Felisha Beebe 03/21/2018 5:40 PM

## 2018-03-21 NOTE — PROGRESS NOTES
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     #Intractable diarrhea  #Nausea  #Dehydration  #Electrolyte derangements  -Suspect nausea, diarrhea secondary to mucositis from recent chemotherapy (5-FU) and XRT.   -C.diff and enteric panel negative.  -Scheduled Lomotil, 4 times per day. Imodium available PRN. Consider re-adding tincture of opium and closely observing for adverse CNS effects.  -Antiemetics also available (Zofran, Compazine). No vomiting since admission.  -IV fluids at 125ml/hr and electrolyte replacement PRN     #Pancytopenia  -Secondary to underlying disease, recent chemo/XRT  -Transfuse for Hgb <8, PLT <10K    #Coagulopathy  -INR 1.39, PTT 36. May be related to malnutrition. Decreased from 1.68 yesterday s/p 3 doses of vitamin K  -Will dose with additional vitamin K, 5mg daily x 2 more days.    #Weakness  #Fall at home  #Fractured R 8th rib  -Suspect weakness, falls secondary to above issues. Was also just started on tincture of opium for diarrhea as outpatient, unclear if experiencing side effects d/t this.  -No history of head trauma or LOC. Alert and oriented today. Will closely monitor and obtain head CT/MRI if needed.   -Fall precautions  -PT/OT --> encouraged patient to work with them  -Tramadol ordered to assist with pain control; also has Tylenol, ketamine/gabapentin/lidocaine and diclofenac available topically.  -UA dirty, culture positive for gram neg rods, started Cipro (x3/21) 500mg BID and will continue to follow cultures.     #Stage IIIA (T2 N1) SCC of anus  -Followed by Dr. Ray  -S/p 1 cycle 5-FU + mitomycin followed by 1 cycle single-agent 5-FU (d/t toxicity and age) with  "concurrent radiation.   -She will complete final fraction of XRT on 3/23. Will continue XRT while inpatient.  -Supportive rectal cares ordered  -Patient expressed multiple times today that she feels that she \"has had a good life\" and that it would be \"okay if I  today.\" Discussed signs/symptoms of depression, patient does not feel she is depressed but simply tired of her current treatment for her cancer and is feeling some hopelessness regarding its ability to cure it. Consulted palliative care SW for assistance with illness coping and decision making.  -Tramadol ordered PRN for pain secondary to radiation dermatitis and rib fracture    #FEN  -MIVF with D5 1/2NS and 20 mEq K+ @ 125ml/hr  -Replace lytes PRN per protocol  -RDAT - calorie counts ordered    #Prophy  -Lovenox 40mg daily, follow coags    #Pain Assessment:   Current Pain Score 3/21/2018 3/21/2018 3/21/2018   Patient currently in pain? yes yes yes   Pain score (0-10) - - -   Pain location Rib cage Rib cage Rib cage   Pain descriptors Sharp;Aching;Constant Sharp;Constant Sharp;Constant;Aching   - Elizabeth is experiencing pain due to fractured rib, radiation treatment. Pain management was discussed and the plan was created in a collaborative fashion.  Elizabeth's response to the current recommendations: engaged  compliant  - Opioid regimen: Tramadol 50mg every 6 hours PRN  - Response to opioid medications: Has not tried yet  - Bowel regimen: not needed  - Pharmacologic adjuvants: topical diclofenac gel, topical ketamine/gabapentin/lidocaine cream    Dispo: Anticipate d/c home in the next 2-3 days pending resolution of symptoms. PT/OT consulted for assistance with discharge planning.     Patient and plan of care discussed with staff attending, Dr. Howe.     Mei Ramon PA-C  Hematology/Oncology  [phone]    Interval History   Elizabeth is doing ok today. She reports she just wants to sleep and makes statements regarding her hopelessness regarding her cancer " "being able to be cured, as well as feeling that she has lived a good life and that it \"would be ok if I  here in the hospital.\" She reports continued pain in her R ribs as well as pain in radiation fields that is limiting her activity.     Physical Exam   Temp: 97.4  F (36.3  C) Temp src: Oral BP: 116/58   Heart Rate: 90 Resp: 16 SpO2: 94 % O2 Device: None (Room air)    Vitals:    18 0744   Weight: 57.6 kg (126 lb 14.4 oz)     Vital Signs with Ranges  Temp:  [97.4  F (36.3  C)-98.6  F (37  C)] 97.4  F (36.3  C)  Heart Rate:  [80-96] 90  Resp:  [16] 16  BP: (100-116)/(57-63) 116/58  SpO2:  [92 %-95 %] 94 %  I/O last 3 completed shifts:  In: 4013.33 [P.O.:900; I.V.:3113.33]  Out: 575 [Urine:575]    Constitutional: Elderly female seen sitting up in bed, no acute distress, resting comfortably.  Eyes: Lids and lashes normal, pupils equal, round and reactive to light, extra ocular muscles intact, sclera clear, conjunctiva normal.  ENT: Normocephalic, without obvious abnormality, atraumatic.  Respiratory: No increased work of breathing, good air exchange, clear to auscultation bilaterally, no crackles or wheezing.  Cardiovascular: Regular rate and rhythm and no murmur noted.  GI: + bowel sounds, soft, non-tender, non-distended.  Musculoskeletal: No edema B/L LE.  Neurologic: No focal deficits.  Neuropsychiatric: Calm, normal eye contact, alert, normal affect, oriented to self, place, time and situation, memory for past and recent events intact and thought process normal.    Medications     dextrose 5% and 0.45% NaCl + KCl 20 mEq/L 100 mL/hr at 18     - MEDICATION INSTRUCTIONS -         diphenoxylate-atropine  1 tablet Oral 4x Daily     ciprofloxacin  500 mg Oral Q12H OSCAR     methocarbamol  500 mg Oral 4x Daily     phytonadione  5 mg Oral Daily     sodium chloride (PF)  3 mL Intracatheter Q8H     calcium-vitamin D  1 tablet Oral Daily     hydrocortisone   Rectal BID     vitamin B complex with vitamin C  " 1 tablet Oral Daily     enoxaparin  40 mg Subcutaneous Q24H       Data   Results for orders placed or performed during the hospital encounter of 03/19/18 (from the past 24 hour(s))   Magnesium   Result Value Ref Range    Magnesium 2.0 1.6 - 2.3 mg/dL   Potassium   Result Value Ref Range    Potassium 4.1 3.4 - 5.3 mmol/L   INR   Result Value Ref Range    INR 1.39 (H) 0.86 - 1.14   Potassium   Result Value Ref Range    Potassium 3.8 3.4 - 5.3 mmol/L   Magnesium   Result Value Ref Range    Magnesium 1.8 1.6 - 2.3 mg/dL   Partial thromboplastin time   Result Value Ref Range    PTT 36 22 - 37 sec   Phosphorus   Result Value Ref Range    Phosphorus 1.1 (L) 2.5 - 4.5 mg/dL   Comprehensive metabolic panel   Result Value Ref Range    Sodium 136 133 - 144 mmol/L    Potassium 3.8 3.4 - 5.3 mmol/L    Chloride 105 94 - 109 mmol/L    Carbon Dioxide 22 20 - 32 mmol/L    Anion Gap 8 3 - 14 mmol/L    Glucose 123 (H) 70 - 99 mg/dL    Urea Nitrogen 9 7 - 30 mg/dL    Creatinine 0.54 0.52 - 1.04 mg/dL    GFR Estimate >90 >60 mL/min/1.7m2    GFR Estimate If Black >90 >60 mL/min/1.7m2    Calcium 7.2 (L) 8.5 - 10.1 mg/dL    Bilirubin Total 0.8 0.2 - 1.3 mg/dL    Albumin 1.3 (L) 3.4 - 5.0 g/dL    Protein Total 3.7 (L) 6.8 - 8.8 g/dL    Alkaline Phosphatase 54 40 - 150 U/L    ALT 21 0 - 50 U/L    AST 19 0 - 45 U/L   CBC with platelets differential   Result Value Ref Range    WBC 1.0 (L) 4.0 - 11.0 10e9/L    RBC Count 3.47 (L) 3.8 - 5.2 10e12/L    Hemoglobin 10.6 (L) 11.7 - 15.7 g/dL    Hematocrit 32.0 (L) 35.0 - 47.0 %    MCV 92 78 - 100 fl    MCH 30.5 26.5 - 33.0 pg    MCHC 33.1 31.5 - 36.5 g/dL    RDW 14.9 10.0 - 15.0 %    Platelet Count 129 (L) 150 - 450 10e9/L    Diff Method Automated Method     % Neutrophils 73.2 %    % Lymphocytes 1.0 %    % Monocytes 21.8 %    % Eosinophils 1.0 %    % Basophils 0.0 %    % Immature Granulocytes 3.0 %    Nucleated RBCs 2 (H) 0 /100    Absolute Neutrophil 0.7 (L) 1.6 - 8.3 10e9/L    Absolute Lymphocytes  0.0 (L) 0.8 - 5.3 10e9/L    Absolute Monocytes 0.2 0.0 - 1.3 10e9/L    Absolute Eosinophils 0.0 0.0 - 0.7 10e9/L    Absolute Basophils 0.0 0.0 - 0.2 10e9/L    Abs Immature Granulocytes 0.0 0 - 0.4 10e9/L    Absolute Nucleated RBC 0.0     Platelet Estimate Confirming automated cell count

## 2018-03-21 NOTE — PLAN OF CARE
Problem: Patient Care Overview  Goal: Plan of Care/Patient Progress Review  OT 7D: Cancel. Patient with very busy day with other providers, per PT plan to attempt to check back later this day. OT to reschedule evaluation as appropriate.

## 2018-03-21 NOTE — DOWNTIME EVENT NOTE
The EMR was down for 2.5 hours on 3/21/2018.    Marge Thakkar was responsible for completing the paper charting during this time period.     The following information was re-entered into the system by Marge Thakkar: Flowsheet data, Intake and output and MAR      Marge Thakkar  3/21/2018

## 2018-03-21 NOTE — PROVIDER NOTIFICATION
Dr. Medina notified of positive urine culture growing lactose fermenting gram negative rods. Awaiting further orders.

## 2018-03-21 NOTE — PLAN OF CARE
Problem: Patient Care Overview  Goal: Plan of Care/Patient Progress Review  PT 7D: Pt at radiation this AM, upon return pt declining participation d/t anticipating lunch. Reviewed importance of OOB mobility, and role of PT in progressing independence with transfers/amb and facilitating safe disch plan from hospital. Will check-back in PM as schedule permits.

## 2018-03-21 NOTE — PLAN OF CARE
Problem: Patient Care Overview  Goal: Plan of Care/Patient Progress Review  Outcome: No Change  1900-0659: VSS. Afebrile. Denied nausea. C/o right rib pain; Tylenol given along with scheduled Robaxin. Pt had greater than five, large incontinent, diarrhea stools overnight. Imodium x1 and Lomotil x2 given. Unable to measure amt as pt is incontinent and is very slow with transfers (secondary to pain) to the commode. She does not make it to the commode in time because of this. Urine culture came back positive for lactose fermenting gram neg rods; Dr. Weinberg notified and deferred abx at this time. Pt does report having dysuria with frequency/urgency. Magnesium replaced for level 2.0; recheck with AM labs. Of note; no AM labs ordered except for replacement rechecks. Orthostatic BPs ordered; still need to be obtained. BG spot checked at HS and was 140. PT/OT consults in place. Pt will have RXT today; will be completed on 3/23. MIVF infusing at 100ml/hr. Labia and anus very edematous, reddened and painful; creams applied gently. Did not sleep much because of frequently stooling. Up with Ax2 to the commode. MMW given once at HS per pt request. Cont to offer anti-diarrheals. Cont POC.

## 2018-03-21 NOTE — PROGRESS NOTES
[facility] Nurse Inpatient Wound Assessment     Initial  Assessment  Reason for consultation: Evaluate and treat BL buttocks and perianal area wound     Assessment  BL buttocks and perianal area wound due to Radiation burn  Status: initial assessment    Treatment Plan  BL buttocks and perianal area wound BID and PRN: Cleanse and apply generous amount of Aquaphor lotion on affected areas. (if ok with MD as she is already on steroid cream)   Ensure pt has Geomatt cushion in the chair while sitting up in the chair.  Orders Written  WO Nurse follow-up plan:weekly  Nursing to notify the Provider(s) and re-consult the WO Nurse if wound(s) deteriorates or new skin concern.    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    Objective Data  Containment of urine/stool: Continent of bladder and incontinent of loose watery stools    Active Diet Order    Active Diet Order      Combination Diet Regular Diet Adult, Safe Tray - with utensils    Output:   I/O last 3 completed shifts:  In: 4013.33 [P.O.:900; I.V.:3113.33]  Out: 575 [Urine:575]    Risk Assessment:    Westley Westley Score  Av.5  Min: 15  Max: 18                            Labs:   Recent Labs  Lab 18  0609   HGB 10.6*   INR 1.39*   WBC 1.0*           Recent Labs  Lab 18  1322   CULT >100,000 colonies/mLLactose fermenting gram negative rods*  10,000 to 50,000 colonies/mLNon lactose fermenting gram negative rods*  Susceptibility testing in progress No growth after 4 days       Physical Exam  Skin assessment:   Focused skin inspection: coccyx/sacrum, buttocks and perianal area    Wound Location:  Right near sacrum, BL buttocks and perianal area        Date of last photo 3/21/18  Wound History:  Currently receiving radiation treatment daily.  Measurements (length x width x depth, in cm) Near right sacrum 3 cm x 1.2 cm  x  0.1 cm and superficial skin erosions to perianal area  Wound Base:  100% dermis  Tunneling N/A  Undermining N/A  Palpation of the wound bed: normal but painful  Periwound skin: intact and erythema- blanchable,   Color: red  Temperature: warm  Drainage:, scant  Description of drainage: moist  Odor: none  Pain: irritable    Interventions  Current support surface: Standard  Atmos Air mattress    Current off-loading measures: pillow, pt able to micro turn independently  Visual inspection of wound(s) completed  Wound Care: None    Supplies: ordered: Aquahor  Education provided today: importance of keeping the pressure off and Aquaphor to soothe the skin    Discussed plan of care with Patient and Nurse      Shaneka Gomez RN

## 2018-03-21 NOTE — PLAN OF CARE
Problem: Patient Care Overview  Goal: Plan of Care/Patient Progress Review  Outcome: Improving    8065-0554: VSS. Zofran given x1 for nausea. Complaining of vaginal pain; creams applied. Also expressing pain in right rib area radiating to shoulder. Tylenol given Q4 hrs. Tpump being used for heat on right back. Incontinent of diarrhea this morning; imodium given. Diarrhea improved since then. EKG obtained prior to electrolyte replacements given low weight. Potassium, magnesium, and phos replaced. Hypoglycemia episode of 68; ice cream and apple juice given until final recheck was 118. MIVF fluids changed to D5 1/2 NS w/20K. INR 1.68; vitamin K given. Pt with decreased intake due to low appetite. Continue to monitor sugars. Daily radiation. Labia and anus swelling and redness from radiation; creams applied as ordered.     7893-0079: Robaxin added for right rib pain. Voltaren gel applied to right ribs and shoulder. Diarrhea increased after eating this evening; imodium given. May benefit from lomotil with evening meds. Potassium recheck 4.1, magnesium recheck 2.0. Will need magnesium replaced again. All rechecks ordered for morning. Pt up with Ax2; moves very slowly with transfers to reduce pain. Encouragement necessary. High fall risk. Calls appropriately. Continue with plan of care.

## 2018-03-22 ENCOUNTER — PATIENT OUTREACH (OUTPATIENT)
Dept: CARE COORDINATION | Facility: CLINIC | Age: 78
End: 2018-03-22

## 2018-03-22 ENCOUNTER — APPOINTMENT (OUTPATIENT)
Dept: RADIATION ONCOLOGY | Facility: CLINIC | Age: 78
End: 2018-03-22
Attending: RADIOLOGY
Payer: MEDICARE

## 2018-03-22 LAB
ALBUMIN SERPL-MCNC: 1.2 G/DL (ref 3.4–5)
ALP SERPL-CCNC: 61 U/L (ref 40–150)
ALT SERPL W P-5'-P-CCNC: 20 U/L (ref 0–50)
ANION GAP SERPL CALCULATED.3IONS-SCNC: 6 MMOL/L (ref 3–14)
APTT PPP: 41 SEC (ref 22–37)
AST SERPL W P-5'-P-CCNC: 18 U/L (ref 0–45)
BACTERIA SPEC CULT: ABNORMAL
BACTERIA SPEC CULT: ABNORMAL
BASOPHILS # BLD AUTO: 0 10E9/L (ref 0–0.2)
BASOPHILS NFR BLD AUTO: 0 %
BILIRUB SERPL-MCNC: 0.9 MG/DL (ref 0.2–1.3)
BUN SERPL-MCNC: 7 MG/DL (ref 7–30)
CALCIUM SERPL-MCNC: 7.1 MG/DL (ref 8.5–10.1)
CHLORIDE SERPL-SCNC: 106 MMOL/L (ref 94–109)
CO2 SERPL-SCNC: 24 MMOL/L (ref 20–32)
CREAT SERPL-MCNC: 0.54 MG/DL (ref 0.52–1.04)
DIFFERENTIAL METHOD BLD: ABNORMAL
EOSINOPHIL # BLD AUTO: 0 10E9/L (ref 0–0.7)
EOSINOPHIL NFR BLD AUTO: 3.8 %
ERYTHROCYTE [DISTWIDTH] IN BLOOD BY AUTOMATED COUNT: 15.1 % (ref 10–15)
GFR SERPL CREATININE-BSD FRML MDRD: >90 ML/MIN/1.7M2
GLUCOSE SERPL-MCNC: 97 MG/DL (ref 70–99)
HCT VFR BLD AUTO: 31.2 % (ref 35–47)
HGB BLD-MCNC: 10.3 G/DL (ref 11.7–15.7)
IMM GRANULOCYTES # BLD: 0 10E9/L (ref 0–0.4)
IMM GRANULOCYTES NFR BLD: 1.9 %
INR PPP: 1.57 (ref 0.86–1.14)
LYMPHOCYTES # BLD AUTO: 0 10E9/L (ref 0.8–5.3)
LYMPHOCYTES NFR BLD AUTO: 2.9 %
Lab: ABNORMAL
MAGNESIUM SERPL-MCNC: 1.7 MG/DL (ref 1.6–2.3)
MCH RBC QN AUTO: 30.4 PG (ref 26.5–33)
MCHC RBC AUTO-ENTMCNC: 33 G/DL (ref 31.5–36.5)
MCV RBC AUTO: 92 FL (ref 78–100)
MONOCYTES # BLD AUTO: 0.3 10E9/L (ref 0–1.3)
MONOCYTES NFR BLD AUTO: 24 %
NEUTROPHILS # BLD AUTO: 0.7 10E9/L (ref 1.6–8.3)
NEUTROPHILS NFR BLD AUTO: 67.4 %
NRBC # BLD AUTO: 0 10*3/UL
NRBC BLD AUTO-RTO: 0 /100
PHOSPHATE SERPL-MCNC: 2.2 MG/DL (ref 2.5–4.5)
PLATELET # BLD AUTO: 110 10E9/L (ref 150–450)
PLATELET # BLD EST: ABNORMAL 10*3/UL
POTASSIUM SERPL-SCNC: 4.6 MMOL/L (ref 3.4–5.3)
PROT SERPL-MCNC: 3.6 G/DL (ref 6.8–8.8)
RBC # BLD AUTO: 3.39 10E12/L (ref 3.8–5.2)
SODIUM SERPL-SCNC: 137 MMOL/L (ref 133–144)
SPECIMEN SOURCE: ABNORMAL
WBC # BLD AUTO: 1 10E9/L (ref 4–11)

## 2018-03-22 PROCEDURE — 36592 COLLECT BLOOD FROM PICC: CPT | Performed by: PHYSICIAN ASSISTANT

## 2018-03-22 PROCEDURE — 85730 THROMBOPLASTIN TIME PARTIAL: CPT | Performed by: PHYSICIAN ASSISTANT

## 2018-03-22 PROCEDURE — A9270 NON-COVERED ITEM OR SERVICE: HCPCS | Mod: GY | Performed by: PHYSICIAN ASSISTANT

## 2018-03-22 PROCEDURE — 77386 ZZH IMRT TREATMENT DELIVERY, COMPLEX: CPT | Performed by: RADIOLOGY

## 2018-03-22 PROCEDURE — 25800025 ZZH RX 258: Performed by: PHYSICIAN ASSISTANT

## 2018-03-22 PROCEDURE — 85025 COMPLETE CBC W/AUTO DIFF WBC: CPT | Performed by: PHYSICIAN ASSISTANT

## 2018-03-22 PROCEDURE — 84100 ASSAY OF PHOSPHORUS: CPT | Performed by: PHYSICIAN ASSISTANT

## 2018-03-22 PROCEDURE — 83735 ASSAY OF MAGNESIUM: CPT | Performed by: PHYSICIAN ASSISTANT

## 2018-03-22 PROCEDURE — 84132 ASSAY OF SERUM POTASSIUM: CPT | Performed by: PHYSICIAN ASSISTANT

## 2018-03-22 PROCEDURE — 80053 COMPREHEN METABOLIC PANEL: CPT | Performed by: PHYSICIAN ASSISTANT

## 2018-03-22 PROCEDURE — 25000132 ZZH RX MED GY IP 250 OP 250 PS 637: Mod: GY | Performed by: INTERNAL MEDICINE

## 2018-03-22 PROCEDURE — 99232 SBSQ HOSP IP/OBS MODERATE 35: CPT | Performed by: INTERNAL MEDICINE

## 2018-03-22 PROCEDURE — 25000128 H RX IP 250 OP 636: Performed by: PHYSICIAN ASSISTANT

## 2018-03-22 PROCEDURE — 25000128 H RX IP 250 OP 636: Performed by: INTERNAL MEDICINE

## 2018-03-22 PROCEDURE — 12000008 ZZH R&B INTERMEDIATE UMMC

## 2018-03-22 PROCEDURE — 25000132 ZZH RX MED GY IP 250 OP 250 PS 637: Mod: GY | Performed by: PHYSICIAN ASSISTANT

## 2018-03-22 PROCEDURE — A9270 NON-COVERED ITEM OR SERVICE: HCPCS | Mod: GY | Performed by: INTERNAL MEDICINE

## 2018-03-22 PROCEDURE — 25000125 ZZHC RX 250: Performed by: PHYSICIAN ASSISTANT

## 2018-03-22 PROCEDURE — 85610 PROTHROMBIN TIME: CPT | Performed by: PHYSICIAN ASSISTANT

## 2018-03-22 RX ORDER — PHENAZOPYRIDINE HYDROCHLORIDE 100 MG/1
100 TABLET, FILM COATED ORAL 3 TIMES DAILY PRN
Status: DISCONTINUED | OUTPATIENT
Start: 2018-03-22 | End: 2018-03-30

## 2018-03-22 RX ORDER — PHYTONADIONE 5 MG/1
5 TABLET ORAL ONCE
Status: COMPLETED | OUTPATIENT
Start: 2018-03-22 | End: 2018-03-22

## 2018-03-22 RX ORDER — MORPHINE 10 MG/ML
6 TINCTURE ORAL EVERY 6 HOURS PRN
Status: DISCONTINUED | OUTPATIENT
Start: 2018-03-22 | End: 2018-03-24

## 2018-03-22 RX ORDER — OXYCODONE HYDROCHLORIDE 5 MG/1
5 TABLET ORAL EVERY 4 HOURS PRN
Status: DISCONTINUED | OUTPATIENT
Start: 2018-03-22 | End: 2018-03-24

## 2018-03-22 RX ADMIN — DIPHENOXYLATE HYDROCHLORIDE AND ATROPINE SULFATE 1 TABLET: 2.5; .025 TABLET ORAL at 13:36

## 2018-03-22 RX ADMIN — PHENAZOPYRIDINE HYDROCHLORIDE 100 MG: 100 TABLET, FILM COATED ORAL at 13:36

## 2018-03-22 RX ADMIN — ACETAMINOPHEN 650 MG: 325 TABLET, FILM COATED ORAL at 17:01

## 2018-03-22 RX ADMIN — ONDANSETRON 8 MG: 2 INJECTION INTRAMUSCULAR; INTRAVENOUS at 21:09

## 2018-03-22 RX ADMIN — PHYTONADIONE 5 MG: 5 TABLET ORAL at 08:08

## 2018-03-22 RX ADMIN — DIPHENOXYLATE HYDROCHLORIDE AND ATROPINE SULFATE 1 TABLET: 2.5; .025 TABLET ORAL at 17:01

## 2018-03-22 RX ADMIN — CIPROFLOXACIN HYDROCHLORIDE 500 MG: 500 TABLET, FILM COATED ORAL at 21:08

## 2018-03-22 RX ADMIN — POTASSIUM CHLORIDE, DEXTROSE MONOHYDRATE AND SODIUM CHLORIDE: 150; 5; 450 INJECTION, SOLUTION INTRAVENOUS at 01:16

## 2018-03-22 RX ADMIN — Medication 6 MG: at 13:36

## 2018-03-22 RX ADMIN — HYDROCORTISONE: 25 CREAM TOPICAL at 21:10

## 2018-03-22 RX ADMIN — PHENAZOPYRIDINE HYDROCHLORIDE 100 MG: 100 TABLET, FILM COATED ORAL at 21:09

## 2018-03-22 RX ADMIN — Medication 2 G: at 08:09

## 2018-03-22 RX ADMIN — METHOCARBAMOL 500 MG: 500 TABLET ORAL at 13:36

## 2018-03-22 RX ADMIN — POTASSIUM CHLORIDE, DEXTROSE MONOHYDRATE AND SODIUM CHLORIDE: 150; 5; 450 INJECTION, SOLUTION INTRAVENOUS at 11:18

## 2018-03-22 RX ADMIN — LOPERAMIDE HYDROCHLORIDE 2 MG: 2 CAPSULE ORAL at 11:17

## 2018-03-22 RX ADMIN — DIPHENOXYLATE HYDROCHLORIDE AND ATROPINE SULFATE 1 TABLET: 2.5; .025 TABLET ORAL at 21:08

## 2018-03-22 RX ADMIN — METHOCARBAMOL 500 MG: 500 TABLET ORAL at 08:09

## 2018-03-22 RX ADMIN — METHOCARBAMOL 500 MG: 500 TABLET ORAL at 21:08

## 2018-03-22 RX ADMIN — DICLOFENAC SODIUM 2 G: 10 GEL TOPICAL at 21:09

## 2018-03-22 RX ADMIN — CIPROFLOXACIN HYDROCHLORIDE 500 MG: 500 TABLET, FILM COATED ORAL at 08:08

## 2018-03-22 RX ADMIN — TRAMADOL HYDROCHLORIDE 50 MG: 50 TABLET, COATED ORAL at 08:09

## 2018-03-22 RX ADMIN — ENOXAPARIN SODIUM 40 MG: 40 INJECTION SUBCUTANEOUS at 21:09

## 2018-03-22 RX ADMIN — DIPHENOXYLATE HYDROCHLORIDE AND ATROPINE SULFATE 1 TABLET: 2.5; .025 TABLET ORAL at 08:08

## 2018-03-22 RX ADMIN — ACETAMINOPHEN 650 MG: 325 TABLET, FILM COATED ORAL at 02:36

## 2018-03-22 RX ADMIN — ONDANSETRON 8 MG: 2 INJECTION INTRAMUSCULAR; INTRAVENOUS at 14:03

## 2018-03-22 RX ADMIN — ACETAMINOPHEN 650 MG: 325 TABLET, FILM COATED ORAL at 21:08

## 2018-03-22 RX ADMIN — METHOCARBAMOL 500 MG: 500 TABLET ORAL at 17:01

## 2018-03-22 RX ADMIN — POTASSIUM PHOSPHATE, MONOBASIC AND POTASSIUM PHOSPHATE, DIBASIC 15 MMOL: 224; 236 INJECTION, SOLUTION INTRAVENOUS at 11:18

## 2018-03-22 RX ADMIN — LOPERAMIDE HYDROCHLORIDE 2 MG: 2 CAPSULE ORAL at 02:36

## 2018-03-22 RX ADMIN — DICLOFENAC SODIUM 2 G: 10 GEL TOPICAL at 17:01

## 2018-03-22 RX ADMIN — PHYTONADIONE 5 MG: 5 TABLET ORAL at 11:17

## 2018-03-22 RX ADMIN — HYDROCORTISONE: 25 CREAM TOPICAL at 08:10

## 2018-03-22 RX ADMIN — POTASSIUM PHOSPHATE, MONOBASIC AND POTASSIUM PHOSPHATE, DIBASIC 20 MMOL: 224; 236 INJECTION, SOLUTION INTRAVENOUS at 01:13

## 2018-03-22 ASSESSMENT — PAIN DESCRIPTION - DESCRIPTORS
DESCRIPTORS: CONSTANT;ACHING;SHARP
DESCRIPTORS: CONSTANT;ACHING;SHARP
DESCRIPTORS: CONSTANT;BURNING;SHARP

## 2018-03-22 NOTE — PROGRESS NOTES
Social Work Services Progress Note    Hospital Day: 3  Date of Initial Social Work Evaluation:  To be completed.  Collaborated with:  Mei Ramon PA-C, OT Brenda     Data:  Per chart review and discussion w/ Mei Ramon PA-C, SW aware of PT recommendations for TCU. Per Mei Ramon PA-C, waiting to see if patient will require TPN, watching calorie counts today, will update me tomorrow as TPN would limit TCU facilities that could accept patient.    Intervention:  SW attempted to meet w/ patient to complete initial assessment. Brenda w/ OT had just been in to see patient who reports that she wants to sleep before PT arrival at 4pm today. SW will follow-up w/ patient tomorrow to complete assessment and begin discussions regarding TCU placement recommendation.    Assessment:  PT recommending TCU placement for patient at this time.    Plan:    Anticipated Disposition:  TBD - anticipate TCU placement, pending discussion w/ patient    Barriers to d/c plan:  Medical stability. Possible TPN - still waiting to determine if patient will need, CHELSY will keep SW updated.     Follow Up:  SW will visit w/ patient tomorrow to complete SW assessment and discuss discharge planning.    JEFF Bull, JAMAL  7D Oncology SW  Phone [phone]  Pager [phone]

## 2018-03-22 NOTE — PLAN OF CARE
Problem: Patient Care Overview  Goal: Plan of Care/Patient Progress Review  OT 7D: Attempted x3 this date. Pt at radiation first time, with other providers second time, and sleeping third time and upon arousal, asking to sleep a little longer before PT's arrival around 4pm. Collaborated with pt for 11AM set time tomorrow (Friday) for OT evaluation.

## 2018-03-22 NOTE — PLAN OF CARE
Problem: Patient Care Overview  Goal: Plan of Care/Patient Progress Review  Outcome: No Change  7288-7694: VSS. Afebrile. Denies nausea. C/o right rib and elfego/rectal burning pain. Tylenol given twice along with scheduled Robaxin. Tramadol available prior to working with therapy and wound care. Pt continues to have frequent, urgent incontinent diarrhea but has slowed some since last night. Scheduled lomotil given; PRN Imodium given once. Pt would like the tincture of opium re-ordered while inpt if possible. Phos replaced for recheck level of 1.8. Recheck with AM labs. Still need orthostatic BPs if able. Bilat leg, ankle and feet edema. Wt up about 7lbs since 3/19. Elfego and rectal area very reddened, excoriated and edematous. Applying anusol scheduled. Open radiation burn on sacrum; wound care orders in place. Placing aquaphor on area. Cipro for UTI. Pt c/o burning sensation with voiding; possibly have pyridium ordered? Pt very slow with movement r/t pain. PT working with pt and rec TCU. Pt refused repositioning overnight r/t pain. Does turn with cares. Little oral intake; calorie counts started at [place]. MIVF ordered at 100ml/hr (notes say 125ml/hr?) Will need to be verified. Cont POC.

## 2018-03-22 NOTE — PROGRESS NOTES
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     #Intractable diarrhea  #Nausea  #Dehydration  #Electrolyte derangements  -Suspect nausea, diarrhea secondary to mucositis from recent chemotherapy (5-FU) and XRT.   -C.diff and enteric panel negative.  -Continue scheduled Lomotil. Imodium available PRN. Added tincture of opium, caution with use if also using oxycodone for pain. Patient reports she never received this medication as an outpatient, so will closely monitor for side effects while inpatient.  -Antiemetics also available (Zofran, Compazine). No vomiting since admission.  -IV fluids at 100ml/hr and electrolyte replacement PRN per sliding scale.    #Pancytopenia  -Secondary to underlying disease, recent chemo/XRT  -Transfuse for Hgb <8, PLT <10K  -Watch for fevers while neutropenic    #Coagulopathy  -Suspect secondary to malnutrition. Dosing daily with vitamin K PRN.    #Weakness  #Fall at home  #Fractured R 8th rib  -Suspect weakness, falls secondary to above issues. Patient reported today she had never received tincture of opium as outpatient so do not suspect this as contributing factor.  -No history of head trauma or LOC. Alert and oriented today. Will closely monitor and obtain head CT/MRI if needed.   -Fall precautions  -PT/OT --> encouraged patient to work with them, she is very motivated but struggling with pain and weakness. Will make oxycodone available for pain while closely monitoring for CNS effects. Hopeful this will help with diarrhea as well.  -Also has Tylenol, ketamine/gabapentin/lidocaine and diclofenac available topically, continue.    #UTI (Klebsiella pneumoniae & proteus  mirabilis)  -Continue Cipro (x3/21) 500mg BID, plan for 10 days of therapy (x3/21-3/30).     #Stage IIIA (T2 N1) SCC of anus  -Followed by Dr. Ray  -S/p 1 cycle 5-FU + mitomycin followed by 1 cycle single-agent 5-FU (d/t toxicity and age) with concurrent radiation.   -She will complete final fraction of XRT on 3/23. Will continue XRT while inpatient.  -Supportive rectal cares ordered  -Intermittently expressing feelings of hopelessness and feeling at peace with dying, not wanting to continue with treatment. Consulted palliative care SW for assistance with illness coping and decision making, appreciate their assistance.    #Malnutrition, severity unspecified with hypoalbuminemia  -Calorie counts start today  -Suspect lack of good PO intake is contributing to impaired wound healing   -May need to start TPN if intake remains poor, to assist with healing and performance status    #FEN  -MIVF with D5 1/2NS and 20 mEq K+ @ 100ml/hr  -Replace lytes PRN per protocol  -RDAT - calorie counts ordered    #Prophy  -Lovenox 40mg daily, follow coags    #Pain Assessment:  Current Pain Score 3/22/2018   Patient currently in pain? yes   Pain score (0-10) -   Pain location Rib cage   Pain descriptors Constant;Burning;Sharp   - Elizabeth is experiencing pain due to fractured rib, radiation treatment. Pain management was discussed and the plan was created in a collaborative fashion.  Elizabeth's response to the current recommendations: engaged  compliant  - Opioid regimen: Oxycodone 5mg every 4 hours PRN  - Response to opioid medications: Has not tried yet  - Bowel regimen: not needed  - Pharmacologic adjuvants: topical diclofenac gel, topical ketamine/gabapentin/lidocaine cream    Dispo: Anticipate d/c to TCU in the next 2-3 days pending control of pain, diarrhea, and nutritional status.    Patient and plan of care discussed with staff attending, Dr. Howe.     NONA LundbergC  Hematology/Oncology  [phone]    Interval History    Elizabeth reports feeling overall a little better today. Still fatigued and with quite a bit of diarrhea. More motivated to eat today as well as work with physical therapy. No fevers. States radiation was much more tolerable for her today.     Physical Exam   Temp: 97.7  F (36.5  C) Temp src: Oral BP: 98/55   Heart Rate: 90 Resp: 18 SpO2: 91 % O2 Device: None (Room air)    Vitals:    03/20/18 0744 03/21/18 2030   Weight: 57.6 kg (126 lb 14.4 oz) 60.5 kg (133 lb 4.8 oz)     Vital Signs with Ranges  Temp:  [95.9  F (35.5  C)-98  F (36.7  C)] 97.7  F (36.5  C)  Heart Rate:  [77-90] 90  Resp:  [16-18] 18  BP: (91-99)/(51-59) 98/55  SpO2:  [91 %-95 %] 91 %  I/O last 3 completed shifts:  In: 3870 [P.O.:900; I.V.:2970]  Out: 650 [Urine:650]    Constitutional: Elderly female seen sitting up in bed, no acute distress, resting comfortably.  Eyes: Lids and lashes normal, pupils equal, round and reactive to light, extra ocular muscles intact, sclera clear, conjunctiva normal.  ENT: Normocephalic, without obvious abnormality, atraumatic.  Respiratory: No increased work of breathing, good air exchange, clear to auscultation bilaterally, no crackles or wheezing.  Cardiovascular: Regular rate and rhythm and no murmur noted.  GI: + bowel sounds, soft, non-tender, non-distended.  Musculoskeletal: 2+ pitting edema noted to B/L LE.  Neurologic: No focal deficits.  Neuropsychiatric: Calm, normal eye contact, alert, normal affect, oriented to self, place, time and situation, memory for past and recent events intact and thought process normal.    Medications     dextrose 5% and 0.45% NaCl + KCl 20 mEq/L 100 mL/hr at 03/22/18 0116     - MEDICATION INSTRUCTIONS -         phytonadione  5 mg Oral Once     diphenoxylate-atropine  1 tablet Oral 4x Daily     ciprofloxacin  500 mg Oral Q12H OSCAR     methocarbamol  500 mg Oral 4x Daily     sodium chloride (PF)  3 mL Intracatheter Q8H     calcium-vitamin D  1 tablet Oral Daily     hydrocortisone    Rectal BID     vitamin B complex with vitamin C  1 tablet Oral Daily     enoxaparin  40 mg Subcutaneous Q24H       Data   Results for orders placed or performed during the hospital encounter of 03/19/18 (from the past 24 hour(s))   Phosphorus   Result Value Ref Range    Phosphorus 1.8 (L) 2.5 - 4.5 mg/dL   INR   Result Value Ref Range    INR 1.57 (H) 0.86 - 1.14   Partial thromboplastin time   Result Value Ref Range    PTT 41 (H) 22 - 37 sec   CBC with platelets differential   Result Value Ref Range    WBC 1.0 (L) 4.0 - 11.0 10e9/L    RBC Count 3.39 (L) 3.8 - 5.2 10e12/L    Hemoglobin 10.3 (L) 11.7 - 15.7 g/dL    Hematocrit 31.2 (L) 35.0 - 47.0 %    MCV 92 78 - 100 fl    MCH 30.4 26.5 - 33.0 pg    MCHC 33.0 31.5 - 36.5 g/dL    RDW 15.1 (H) 10.0 - 15.0 %    Platelet Count 110 (L) 150 - 450 10e9/L    Diff Method Automated Method     % Neutrophils 67.4 %    % Lymphocytes 2.9 %    % Monocytes 24.0 %    % Eosinophils 3.8 %    % Basophils 0.0 %    % Immature Granulocytes 1.9 %    Nucleated RBCs 0 0 /100    Absolute Neutrophil 0.7 (L) 1.6 - 8.3 10e9/L    Absolute Lymphocytes 0.0 (L) 0.8 - 5.3 10e9/L    Absolute Monocytes 0.3 0.0 - 1.3 10e9/L    Absolute Eosinophils 0.0 0.0 - 0.7 10e9/L    Absolute Basophils 0.0 0.0 - 0.2 10e9/L    Abs Immature Granulocytes 0.0 0 - 0.4 10e9/L    Absolute Nucleated RBC 0.0     Platelet Estimate Confirming automated cell count    Comprehensive metabolic panel   Result Value Ref Range    Sodium 137 133 - 144 mmol/L    Potassium 4.6 3.4 - 5.3 mmol/L    Chloride 106 94 - 109 mmol/L    Carbon Dioxide 24 20 - 32 mmol/L    Anion Gap 6 3 - 14 mmol/L    Glucose 97 70 - 99 mg/dL    Urea Nitrogen 7 7 - 30 mg/dL    Creatinine 0.54 0.52 - 1.04 mg/dL    GFR Estimate >90 >60 mL/min/1.7m2    GFR Estimate If Black >90 >60 mL/min/1.7m2    Calcium 7.1 (L) 8.5 - 10.1 mg/dL    Bilirubin Total 0.9 0.2 - 1.3 mg/dL    Albumin 1.2 (L) 3.4 - 5.0 g/dL    Protein Total 3.6 (L) 6.8 - 8.8 g/dL    Alkaline Phosphatase 61  40 - 150 U/L    ALT 20 0 - 50 U/L    AST 18 0 - 45 U/L   Phosphorus   Result Value Ref Range    Phosphorus 2.2 (L) 2.5 - 4.5 mg/dL   Magnesium   Result Value Ref Range    Magnesium 1.7 1.6 - 2.3 mg/dL

## 2018-03-22 NOTE — PLAN OF CARE
Problem: Patient Care Overview  Goal: Plan of Care/Patient Progress Review  Outcome: No Change  Afebrile, vital signs are stable. Complaints of right rib cage pain. Tramadol given prior to radiation treatment with some improvement. Oxycodone prn ordered now for pain management. Use with caution with opioid tincture as may be sedating. Nausea x 1, no emesis. Zofran given with improvement. One loose/watery stool noted this shift. Pyridium given for urinary urgency/burning x 1. Perineal/buttocks excoriation and edema. Aquaphor applied as needed. Imodium, lomotil and opioid tincture given. Magnesium and phosphorus replaced with rechecks scheduled for tomorrow morning.

## 2018-03-22 NOTE — PROGRESS NOTES
Clinic Care Coordination Contact  Care Team Conversations    Received notification that pt was seen in ED, noted that she is currently admitted inpatient. Will monitor for discharge and plan outreach at that time.

## 2018-03-22 NOTE — CONSULTS
"Palliative Care Inpatient Clinical Social Work Assessment    Patient Information:  Elizabeth is a 77 year old woman with stage IIIA SCC of anus on chemoradiation who is admitted with intractable diarrhea, nausea, weakness and dehydration. She has completed chemotherapy and is currently undergoing radiation. Palliative clinical social work has been consulted for assessment of coping and overall goals of care discussion.    Visit summary: I met with Elizabeth in the late afternoon. She had been busy upon prior attempts but did express interest in meeting. Once we were able to meet she was talkative and seemed to process through events by talking. She initially told me about the course of events over the past week starting with attending her oncology appointments last week with her friend and planning out the ballet classes for the weekend. She then tells me of how she came to the hospital. Some details did not seem to line up but as she talked further this seemed to make more sense. She also reflected on her \"spirit\" and how she has been feeling. At this point her plan of care seems to align with her current goals.    Relevant Symptoms/Concerns     Physical:  Diarrhea, she hopes this will resolve with \"the opium\" that she is receiving. She noted some pain during shifting in the bed but did not further comment on this.    Psychological/Emotional/Existential:  There has been concern this week with how she is coping with physical symptoms. Mood has been improving over course of her stay.   Family/Social/Caregiver:   Did not thoroughly assess family system or sources of support today.   Developmental:      Mental Health:      End of Life:      Cultural/Sikhism/Spiritual:      Grief/Loss:      Concurrent Stressors:        Comments:       Strengths     Physical: She found transporting to radiation lying down rather than in a wheelchair was very helpful and made it more tolerable.   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 The daughter of this duo, Mei, is a nurse and it sounds like she assists Elizabeth to appointments at times.   Developmental:      Mental Health:      End of Life:      Cultural/Oriental orthodox/Spiritual:      Grief/Loss:         Comments:       Goals/Decision Making/Advance Care Planning   Preferences:  She hopes that tomorrow (Friday) is her last radiation treatment.    Concerns:  She reviewed how earlier in the week she was not sure she wanted to \"keep going with all of this.\" She said that she was not feeling well and that her \"spirits were down.\" After encouragement from staff and working on ways to help her feel better she says that her \"spirits are now lifted.\"    Documents:  No health care directive on file - this is important to assess at future visits   Decision Making Issues:        Comments:       Resource Needs     Discharge Planning:  Per Unit/Program  and/or Care Coordinator   Other:      Comments:       Sources of Information   Patient:  Elizabeth   Family:  No family present   Staff:  Mei Ramon, Oncology PA   Chart Review:      Other:       Intervention (Check all that apply)    X   Assessment of palliative specific issues          Introduction of Palliative clinical social work interventions     X   Adjustment to illness counseling        Advanced care planning        Attended/participated in care conference        Behavioral interventions for symptom management    X   Facilitation of processing of thoughts/feelings        Family communication facilitated        Grief counseling     X   Goals of care discussion/facilitation        Life legacy work        Life review facilitation        Psychoeducation        Re-framing    "     Resource referral        Other:       Comments:  Elizabeth was talkative and engaged. She apologized for talking so much yet also said that she feels that talking through what has happened the past couple days has been helpful.    Plan:  I will plan to follow up next week if she remains inpatient.       JEFF Adrian, [facility]  Palliative Care Clinical   Pager [phone]    [facility] Inpatient Team Consult pager [phone] (M-F 8-4:30)  After-hours Answering Service [phone]

## 2018-03-22 NOTE — PLAN OF CARE
Problem: Patient Care Overview  Goal: Plan of Care/Patient Progress Review  Outcome: No Change    [id number]: VSS. Tylenol given with scheduled robaxin. Tramadol now available PRN for therapy and wound cares. Voltaren gel applied to right shoulder and ribs. Cipro added for UTI. Radiation therapy completed this morning. Diarrhea improved during the day; lomotil scheduled and PRN imodium given. Labia/anus swelling and redness; applying hydrocortisone cream. Open radiation burn wound found on sacrum; WOC consult placed. WOC suggested applying aquaphor and using geomatt when up in the chair.      [id number]: Diarrhea worse in the evening. Incontinent of stool but has been continent of urine when up to the commode. PT saw pt and recommended TCU placement. Therapy helpful in giving recommendations to reduce pain when getting in and out of bed. Walked in halls with walker. Palliative consult placed for illness coping and decision making. Appetite slightly improved today; alee counts start at midnight. Continue with plan of care.

## 2018-03-23 ENCOUNTER — APPOINTMENT (OUTPATIENT)
Dept: PHYSICAL THERAPY | Facility: CLINIC | Age: 78
DRG: 393 | End: 2018-03-23
Payer: MEDICARE

## 2018-03-23 ENCOUNTER — APPOINTMENT (OUTPATIENT)
Dept: OCCUPATIONAL THERAPY | Facility: CLINIC | Age: 78
DRG: 393 | End: 2018-03-23
Attending: INTERNAL MEDICINE
Payer: MEDICARE

## 2018-03-23 ENCOUNTER — ALLIED HEALTH/NURSE VISIT (OUTPATIENT)
Dept: RADIATION ONCOLOGY | Facility: CLINIC | Age: 78
End: 2018-03-23
Attending: RADIOLOGY
Payer: MEDICARE

## 2018-03-23 LAB
ALBUMIN SERPL-MCNC: 1.2 G/DL (ref 3.4–5)
ALP SERPL-CCNC: 75 U/L (ref 40–150)
ALT SERPL W P-5'-P-CCNC: 20 U/L (ref 0–50)
ANION GAP SERPL CALCULATED.3IONS-SCNC: 7 MMOL/L (ref 3–14)
APTT PPP: 38 SEC (ref 22–37)
AST SERPL W P-5'-P-CCNC: 14 U/L (ref 0–45)
BACTERIA SPEC CULT: NO GROWTH
BASOPHILS # BLD AUTO: 0 10E9/L (ref 0–0.2)
BASOPHILS NFR BLD AUTO: 0 %
BILIRUB SERPL-MCNC: 0.8 MG/DL (ref 0.2–1.3)
BUN SERPL-MCNC: 6 MG/DL (ref 7–30)
CALCIUM SERPL-MCNC: 7.4 MG/DL (ref 8.5–10.1)
CHLORIDE SERPL-SCNC: 105 MMOL/L (ref 94–109)
CO2 SERPL-SCNC: 24 MMOL/L (ref 20–32)
CREAT SERPL-MCNC: 0.56 MG/DL (ref 0.52–1.04)
DIFFERENTIAL METHOD BLD: ABNORMAL
EOSINOPHIL # BLD AUTO: 0 10E9/L (ref 0–0.7)
EOSINOPHIL NFR BLD AUTO: 3.1 %
ERYTHROCYTE [DISTWIDTH] IN BLOOD BY AUTOMATED COUNT: 15.4 % (ref 10–15)
GFR SERPL CREATININE-BSD FRML MDRD: >90 ML/MIN/1.7M2
GLUCOSE SERPL-MCNC: 97 MG/DL (ref 70–99)
HCT VFR BLD AUTO: 33 % (ref 35–47)
HGB BLD-MCNC: 10.9 G/DL (ref 11.7–15.7)
IMM GRANULOCYTES # BLD: 0 10E9/L (ref 0–0.4)
IMM GRANULOCYTES NFR BLD: 0.8 %
INR PPP: 1.27 (ref 0.86–1.14)
LYMPHOCYTES # BLD AUTO: 0.1 10E9/L (ref 0.8–5.3)
LYMPHOCYTES NFR BLD AUTO: 6.2 %
MAGNESIUM SERPL-MCNC: 1.7 MG/DL (ref 1.6–2.3)
MCH RBC QN AUTO: 31.1 PG (ref 26.5–33)
MCHC RBC AUTO-ENTMCNC: 33 G/DL (ref 31.5–36.5)
MCV RBC AUTO: 94 FL (ref 78–100)
MONOCYTES # BLD AUTO: 0.2 10E9/L (ref 0–1.3)
MONOCYTES NFR BLD AUTO: 17.1 %
NEUTROPHILS # BLD AUTO: 0.9 10E9/L (ref 1.6–8.3)
NEUTROPHILS NFR BLD AUTO: 72.8 %
NRBC # BLD AUTO: 0 10*3/UL
NRBC BLD AUTO-RTO: 0 /100
PHOSPHATE SERPL-MCNC: 2 MG/DL (ref 2.5–4.5)
PLATELET # BLD AUTO: 107 10E9/L (ref 150–450)
PLATELET # BLD EST: ABNORMAL 10*3/UL
POTASSIUM SERPL-SCNC: 4.5 MMOL/L (ref 3.4–5.3)
PROT SERPL-MCNC: 3.9 G/DL (ref 6.8–8.8)
RBC # BLD AUTO: 3.51 10E12/L (ref 3.8–5.2)
SODIUM SERPL-SCNC: 136 MMOL/L (ref 133–144)
SPECIMEN SOURCE: NORMAL
WBC # BLD AUTO: 1.3 10E9/L (ref 4–11)

## 2018-03-23 PROCEDURE — 83735 ASSAY OF MAGNESIUM: CPT | Performed by: PHYSICIAN ASSISTANT

## 2018-03-23 PROCEDURE — 25000128 H RX IP 250 OP 636: Performed by: INTERNAL MEDICINE

## 2018-03-23 PROCEDURE — 77386 ZZH IMRT TREATMENT DELIVERY, COMPLEX: CPT | Performed by: RADIOLOGY

## 2018-03-23 PROCEDURE — 85025 COMPLETE CBC W/AUTO DIFF WBC: CPT | Performed by: PHYSICIAN ASSISTANT

## 2018-03-23 PROCEDURE — 85610 PROTHROMBIN TIME: CPT | Performed by: PHYSICIAN ASSISTANT

## 2018-03-23 PROCEDURE — 85730 THROMBOPLASTIN TIME PARTIAL: CPT | Performed by: PHYSICIAN ASSISTANT

## 2018-03-23 PROCEDURE — 84100 ASSAY OF PHOSPHORUS: CPT | Performed by: PHYSICIAN ASSISTANT

## 2018-03-23 PROCEDURE — 25000125 ZZHC RX 250: Performed by: PHYSICIAN ASSISTANT

## 2018-03-23 PROCEDURE — 25000132 ZZH RX MED GY IP 250 OP 250 PS 637: Mod: GY | Performed by: PHYSICIAN ASSISTANT

## 2018-03-23 PROCEDURE — A9270 NON-COVERED ITEM OR SERVICE: HCPCS | Mod: GY | Performed by: INTERNAL MEDICINE

## 2018-03-23 PROCEDURE — 77336 RADIATION PHYSICS CONSULT: CPT | Performed by: RADIOLOGY

## 2018-03-23 PROCEDURE — 99232 SBSQ HOSP IP/OBS MODERATE 35: CPT | Performed by: INTERNAL MEDICINE

## 2018-03-23 PROCEDURE — 25000132 ZZH RX MED GY IP 250 OP 250 PS 637: Mod: GY | Performed by: INTERNAL MEDICINE

## 2018-03-23 PROCEDURE — 40000133 ZZH STATISTIC OT WARD VISIT

## 2018-03-23 PROCEDURE — 25800025 ZZH RX 258: Performed by: PHYSICIAN ASSISTANT

## 2018-03-23 PROCEDURE — 97535 SELF CARE MNGMENT TRAINING: CPT | Mod: GO

## 2018-03-23 PROCEDURE — 12000008 ZZH R&B INTERMEDIATE UMMC

## 2018-03-23 PROCEDURE — 36592 COLLECT BLOOD FROM PICC: CPT | Performed by: PHYSICIAN ASSISTANT

## 2018-03-23 PROCEDURE — A9270 NON-COVERED ITEM OR SERVICE: HCPCS | Mod: GY | Performed by: PHYSICIAN ASSISTANT

## 2018-03-23 PROCEDURE — 97530 THERAPEUTIC ACTIVITIES: CPT | Mod: GP

## 2018-03-23 PROCEDURE — 40000193 ZZH STATISTIC PT WARD VISIT

## 2018-03-23 PROCEDURE — 80053 COMPREHEN METABOLIC PANEL: CPT | Performed by: PHYSICIAN ASSISTANT

## 2018-03-23 PROCEDURE — 97116 GAIT TRAINING THERAPY: CPT | Mod: GP

## 2018-03-23 PROCEDURE — 25000128 H RX IP 250 OP 636: Performed by: PHYSICIAN ASSISTANT

## 2018-03-23 PROCEDURE — 97165 OT EVAL LOW COMPLEX 30 MIN: CPT | Mod: GO

## 2018-03-23 RX ORDER — PHYTONADIONE 5 MG/1
5 TABLET ORAL ONCE
Status: COMPLETED | OUTPATIENT
Start: 2018-03-23 | End: 2018-03-23

## 2018-03-23 RX ORDER — FUROSEMIDE 10 MG/ML
20 INJECTION INTRAMUSCULAR; INTRAVENOUS ONCE
Status: COMPLETED | OUTPATIENT
Start: 2018-03-23 | End: 2018-03-23

## 2018-03-23 RX ADMIN — POTASSIUM CHLORIDE, DEXTROSE MONOHYDRATE AND SODIUM CHLORIDE: 150; 5; 450 INJECTION, SOLUTION INTRAVENOUS at 20:22

## 2018-03-23 RX ADMIN — HYDROCORTISONE: 25 CREAM TOPICAL at 22:05

## 2018-03-23 RX ADMIN — ENOXAPARIN SODIUM 40 MG: 40 INJECTION SUBCUTANEOUS at 20:22

## 2018-03-23 RX ADMIN — DIPHENOXYLATE HYDROCHLORIDE AND ATROPINE SULFATE 1 TABLET: 2.5; .025 TABLET ORAL at 17:55

## 2018-03-23 RX ADMIN — FUROSEMIDE 20 MG: 10 INJECTION, SOLUTION INTRAVENOUS at 22:04

## 2018-03-23 RX ADMIN — POTASSIUM PHOSPHATE, MONOBASIC AND POTASSIUM PHOSPHATE, DIBASIC 15 MMOL: 224; 236 INJECTION, SOLUTION INTRAVENOUS at 14:21

## 2018-03-23 RX ADMIN — OXYCODONE HYDROCHLORIDE 5 MG: 5 TABLET ORAL at 10:35

## 2018-03-23 RX ADMIN — HYDROCORTISONE: 25 CREAM TOPICAL at 08:29

## 2018-03-23 RX ADMIN — Medication 6 MG: at 01:22

## 2018-03-23 RX ADMIN — CIPROFLOXACIN HYDROCHLORIDE 500 MG: 500 TABLET, FILM COATED ORAL at 10:35

## 2018-03-23 RX ADMIN — PHENAZOPYRIDINE HYDROCHLORIDE 100 MG: 100 TABLET, FILM COATED ORAL at 22:04

## 2018-03-23 RX ADMIN — METHOCARBAMOL 500 MG: 500 TABLET ORAL at 22:04

## 2018-03-23 RX ADMIN — METHOCARBAMOL 500 MG: 500 TABLET ORAL at 10:36

## 2018-03-23 RX ADMIN — METHOCARBAMOL 500 MG: 500 TABLET ORAL at 14:21

## 2018-03-23 RX ADMIN — PHENAZOPYRIDINE HYDROCHLORIDE 100 MG: 100 TABLET, FILM COATED ORAL at 10:36

## 2018-03-23 RX ADMIN — POTASSIUM CHLORIDE, DEXTROSE MONOHYDRATE AND SODIUM CHLORIDE: 150; 5; 450 INJECTION, SOLUTION INTRAVENOUS at 01:27

## 2018-03-23 RX ADMIN — METHOCARBAMOL 500 MG: 500 TABLET ORAL at 17:55

## 2018-03-23 RX ADMIN — DIPHENOXYLATE HYDROCHLORIDE AND ATROPINE SULFATE 1 TABLET: 2.5; .025 TABLET ORAL at 10:36

## 2018-03-23 RX ADMIN — Medication 6 MG: at 14:21

## 2018-03-23 RX ADMIN — OXYCODONE HYDROCHLORIDE 5 MG: 5 TABLET ORAL at 17:11

## 2018-03-23 RX ADMIN — DICLOFENAC SODIUM 2 G: 10 GEL TOPICAL at 17:55

## 2018-03-23 RX ADMIN — Medication 2 G: at 08:29

## 2018-03-23 RX ADMIN — DIPHENOXYLATE HYDROCHLORIDE AND ATROPINE SULFATE 1 TABLET: 2.5; .025 TABLET ORAL at 14:21

## 2018-03-23 RX ADMIN — PHYTONADIONE 5 MG: 5 TABLET ORAL at 10:36

## 2018-03-23 RX ADMIN — OXYCODONE HYDROCHLORIDE 5 MG: 5 TABLET ORAL at 23:35

## 2018-03-23 RX ADMIN — CIPROFLOXACIN HYDROCHLORIDE 500 MG: 500 TABLET, FILM COATED ORAL at 20:22

## 2018-03-23 ASSESSMENT — PAIN DESCRIPTION - DESCRIPTORS
DESCRIPTORS: ACHING;SHARP
DESCRIPTORS: ACHING
DESCRIPTORS: ACHING
DESCRIPTORS: BURNING
DESCRIPTORS: ACHING
DESCRIPTORS: ACHING
DESCRIPTORS: ACHING;SHARP

## 2018-03-23 NOTE — PLAN OF CARE
Problem: Patient Care Overview  Goal: Plan of Care/Patient Progress Review  Outcome: No Change    VSS. Right rib pain improved compared to last few days. Tylenol given with scheduled robaxin. Pain reduced with movement by allowing pt to complete transfers on her own and standby for assistance. Walked in halls with SBA and walker. Zofran given x1 for nausea. No stools this evening; scheduled lomotil given; PRN opium tincture available if needed. Pyridium given for urinary burning. Hydrocortisone cream applied to swollen labia and perianal area. Aquaphor applied to open radiation burn on sacrum. On alee counts; has been working on bites of same meal all shift. Palliative consulted. Pt looking forward to working with PT and OT tomorrow. May need TCU upon discharge. Last radiation treatment tomorrow morning. Continue with plan of care.

## 2018-03-23 NOTE — PLAN OF CARE
Problem: Patient Care Overview  Goal: Plan of Care/Patient Progress Review  PT 7D: Up with Ax1    Discharge Planner PT   Patient plan for discharge: rehab  Current status: Engaged pt in bed mobility min A, sit <> stand from EOB and commode at SBA to CGA with FWW, transfers EOB <> commode with FWW at CGA, gait with FWW at CGA for ~180ft. Pt moving very slowly and talking through sequencing step-by-step each move.   Barriers to return to prior living situation: medical status, pain, rib fractures  Recommendations for discharge: TCU  Rationale for recommendations: Pt is below baseline for mobility and will benefit from continued rehab to return to PLOF.        Entered by: Jane Jarvis 03/23/2018 5:22 PM

## 2018-03-23 NOTE — PROGRESS NOTES
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     #Intractable diarrhea --> improving  #Nausea  #Dehydration --> improving  #Electrolyte derangements --> improving  -Suspect nausea, diarrhea secondary to mucositis from recent chemotherapy (5-FU) and XRT.   -C.diff and enteric panel negative.  -Continue scheduled Lomotil. Imodium available PRN. Added tincture of opium, which seems to be helping, with minimal side effects.  -Antiemetics also available (Zofran, Compazine). No vomiting since admission.  -IV fluids at 100ml/hr and electrolyte replacement PRN per sliding scale.    #Pancytopenia  -Secondary to underlying disease, recent chemo/XRT  -Transfuse for Hgb <8, PLT <10K - no transfusion needs thus far  -Watch for fevers while neutropenic    #Coagulopathy  -Suspect secondary to malnutrition. Dosing daily with vitamin K PRN.    #Weakness  #Fall at home  #Fractured R 8th rib  -Suspect weakness, falls secondary to above issues. Patient reports she had never received tincture of opium as outpatient so do not suspect this as contributing factor.  -No history of head trauma or LOC. Alert and oriented today. Will closely monitor and obtain head CT/MRI if needed.   -Fall precautions  -PT/OT --> encouraged patient to work with them, she is very motivated but struggling with pain and weakness. Oxycodone available for pain, while closely monitoring for CNS effects (especially while giving tincture of opium).   -Also has Tylenol, ketamine/gabapentin/lidocaine and diclofenac available topically, which she finds helpful, so will continue.    #UTI (Klebsiella pneumoniae & proteus mirabilis)  -Continue Cipro (x3/21) 500mg BID, plan  for 10 days of therapy (x3/21-3/30).     #Stage IIIA (T2 N1) SCC of anus  -Followed by Dr. Ray  -S/p 1 cycle 5-FU + mitomycin followed by 1 cycle single-agent 5-FU (d/t toxicity and age) with concurrent radiation.   -Completed radiation today.  -Supportive rectal cares ordered  -Intermittently expressing feelings of hopelessness and feeling at peace with dying, not wanting to continue with treatment. Consulted palliative care SW for assistance with illness coping and decision making, appreciate their assistance. Feeling much more hopeful today now that she has completed XRT, will continue supportive cares, she has f/u appt with Dr. Ray in ~2 weeks.    #Malnutrition, severity unspecified with hypoalbuminemia  -Calorie counts started 3/22  -Suspect lack of good PO intake is contributing to impaired wound healing   -PO intake improving, no plans for TPN at this time (do not think she would tolerate enterals d/t diarrhea)    #FEN  -MIVF with D5 1/2NS and 20 mEq K+ @ 100ml/hr  -Replace lytes PRN per protocol  -RDAT - calorie counts    #Prophy  -Lovenox 40mg daily, follow coags    #Pain Assessment:  Current Pain Score 3/23/2018   Patient currently in pain? yes   Pain score (0-10) -   Pain location Rib cage   Pain descriptors Aching   - Elizabeth is experiencing pain due to fractured rib, radiation treatment. Pain management was discussed and the plan was created in a collaborative fashion.  Elizabeth's response to the current recommendations: engaged  compliant  - Opioid regimen: Oxycodone 5mg every 4 hours PRN  - Response to opioid medications: Has not tried yet  - Bowel regimen: not needed  - Pharmacologic adjuvants: topical diclofenac gel, topical ketamine/gabapentin/lidocaine cream    Dispo: Anticipate d/c to TCU in the next 2-3 days pending control of pain, diarrhea, and nutritional status.    Patient and plan of care discussed with staff attending, Dr. Howe.     Mei Ramon,  CHELSY  Hematology/Oncology  [phone]    Interval History   Elizabeth continues to feel better each day. She is very motivated to work with therapy today and notes diarrhea has improved. She is less hesitant to eat now that her stools have slowed down. Her pain is under good control. No fevers, sweats or chills. No chest pain or SOB. Her nausea is better, no vomiting.     Physical Exam   Temp: 98.1  F (36.7  C) Temp src: Oral BP: 102/51 Pulse: 87 Heart Rate: 96 Resp: 20 SpO2: 90 % (Rn notified) O2 Device: None (Room air)    Vitals:    03/20/18 0744 03/21/18 2030   Weight: 57.6 kg (126 lb 14.4 oz) 60.5 kg (133 lb 4.8 oz)     Vital Signs with Ranges  Temp:  [96  F (35.6  C)-98.4  F (36.9  C)] 98.1  F (36.7  C)  Pulse:  [87] 87  Heart Rate:  [86-99] 96  Resp:  [18-20] 20  BP: ()/(51-63) 102/51  SpO2:  [90 %-93 %] 90 %  I/O last 3 completed shifts:  In: 3070 [P.O.:540; I.V.:2530]  Out: 550 [Urine:550]    Constitutional: Elderly female seen sitting up in bed, no acute distress, resting comfortably.  Eyes: Lids and lashes normal, pupils equal, round and reactive to light, extra ocular muscles intact, sclera clear, conjunctiva normal.  ENT: Normocephalic, without obvious abnormality, atraumatic.  Respiratory: No increased work of breathing, good air exchange, clear to auscultation bilaterally, no crackles or wheezing.  Cardiovascular: Regular rate and rhythm and no murmur noted.  GI: + bowel sounds, soft, non-tender, non-distended.  Musculoskeletal: 1-2+ pitting edema noted to B/L LE.  Neurologic: No focal deficits.  Neuropsychiatric: Calm, normal eye contact, alert, normal affect, oriented to self, place, time and situation, memory for past and recent events intact and thought process normal. Pleasant and cooperative.    Medications     dextrose 5% and 0.45% NaCl + KCl 20 mEq/L 100 mL/hr at 03/23/18 0127     - MEDICATION INSTRUCTIONS -         diphenoxylate-atropine  1 tablet Oral 4x Daily     ciprofloxacin  500 mg  Oral Q12H OSCAR     methocarbamol  500 mg Oral 4x Daily     sodium chloride (PF)  3 mL Intracatheter Q8H     calcium-vitamin D  1 tablet Oral Daily     hydrocortisone   Rectal BID     vitamin B complex with vitamin C  1 tablet Oral Daily     enoxaparin  40 mg Subcutaneous Q24H       Data   Results for orders placed or performed during the hospital encounter of 03/19/18 (from the past 24 hour(s))   INR   Result Value Ref Range    INR 1.27 (H) 0.86 - 1.14   Partial thromboplastin time   Result Value Ref Range    PTT 38 (H) 22 - 37 sec   CBC with platelets differential   Result Value Ref Range    WBC 1.3 (L) 4.0 - 11.0 10e9/L    RBC Count 3.51 (L) 3.8 - 5.2 10e12/L    Hemoglobin 10.9 (L) 11.7 - 15.7 g/dL    Hematocrit 33.0 (L) 35.0 - 47.0 %    MCV 94 78 - 100 fl    MCH 31.1 26.5 - 33.0 pg    MCHC 33.0 31.5 - 36.5 g/dL    RDW 15.4 (H) 10.0 - 15.0 %    Platelet Count 107 (L) 150 - 450 10e9/L    Diff Method Automated Method     % Neutrophils 72.8 %    % Lymphocytes 6.2 %    % Monocytes 17.1 %    % Eosinophils 3.1 %    % Basophils 0.0 %    % Immature Granulocytes 0.8 %    Nucleated RBCs 0 0 /100    Absolute Neutrophil 0.9 (L) 1.6 - 8.3 10e9/L    Absolute Lymphocytes 0.1 (L) 0.8 - 5.3 10e9/L    Absolute Monocytes 0.2 0.0 - 1.3 10e9/L    Absolute Eosinophils 0.0 0.0 - 0.7 10e9/L    Absolute Basophils 0.0 0.0 - 0.2 10e9/L    Abs Immature Granulocytes 0.0 0 - 0.4 10e9/L    Absolute Nucleated RBC 0.0     Platelet Estimate Confirming automated cell count    Comprehensive metabolic panel   Result Value Ref Range    Sodium 136 133 - 144 mmol/L    Potassium 4.5 3.4 - 5.3 mmol/L    Chloride 105 94 - 109 mmol/L    Carbon Dioxide 24 20 - 32 mmol/L    Anion Gap 7 3 - 14 mmol/L    Glucose 97 70 - 99 mg/dL    Urea Nitrogen 6 (L) 7 - 30 mg/dL    Creatinine 0.56 0.52 - 1.04 mg/dL    GFR Estimate >90 >60 mL/min/1.7m2    GFR Estimate If Black >90 >60 mL/min/1.7m2    Calcium 7.4 (L) 8.5 - 10.1 mg/dL    Bilirubin Total 0.8 0.2 - 1.3 mg/dL     Albumin 1.2 (L) 3.4 - 5.0 g/dL    Protein Total 3.9 (L) 6.8 - 8.8 g/dL    Alkaline Phosphatase 75 40 - 150 U/L    ALT 20 0 - 50 U/L    AST 14 0 - 45 U/L   Phosphorus   Result Value Ref Range    Phosphorus 2.0 (L) 2.5 - 4.5 mg/dL   Magnesium   Result Value Ref Range    Magnesium 1.7 1.6 - 2.3 mg/dL

## 2018-03-23 NOTE — PROGRESS NOTES
"Social Work Services Progress Note    Hospital Day: 4  Date of Initial Social Work Evaluation:  To be completed.   Collaborated with:  Patient, Mei Ramon PA-C    Data:  SW consulted to meet w/ patient for discharge planning. Per PT/OT recommend TCU at discharge.     Intervention:  SW met w/ patient at bedside to introduce self and role of inpatient SW, discuss discharge planning. Patient immediately tells this writer that she is trying to take a nap \"before I have to go to OT at 1:00pm.\" Patient was agreeable to briefly discussing discharge plan w/ this writer.     SW explained that therapies have recommended TCU for patient at discharge, patient reports she is aware of and agreeable to this discharge plan. When SW asked if there are any facilities that patient knows of for SW to send referrals, patient reports, \"I have already called my friend Adamaris who works through [employer] and left her a message about it. She would know places that would be good for me and I want to talk to her about it before I make any decisions. I'm waiting for her to call me back.\" SW asked if this writer could call Adamaris to discuss any options she would have in mind for patient, patient says she does not want this writer to call Adamaris. Patient does ask SW to write down this writer's direct phone number so that patient can call this writer after she hears back from Adamaris.     SW did explain that it will be important to begin sending referrals as soon as possible as she will be medically stable for discharge today per SW discussion w/ Mei Ramon PA-C. Per patient, \"I'm not medically ready today but I will be tomorrow.\" Patient does express understanding the SW will need to start referral process today, patient reports she will call SW before 4:00pm today to discuss referral options.     If SW does not hear back from patient this afternoon, SW will follow-up w/ patient before end of day today.    Assessment:  Patient reports she is " agreeable w/ plan to discharge to TCU, waiting to talk to her friend Adamaris before she provides SW w/ any referral options.    Plan:    Anticipated Disposition:  Facility:  Waiting for patient to provide referral options    Barriers to d/c plan:  Patient wants to talk w/ her friend Adamaris before making any decisions about TCU facilities for SW to send referrals.    Follow Up:  SW will await call from patient this afternoon to discuss referral options. SW will check-in w/ patient at bedside before end of day if SW does not receive call from patient.    ADDENDUM 4:55pm  Patient requested her friend Adamaris call SW directly to confirm TCU referral options. Per Adamaris (p: [phone]), patient would like referrals sent to the following facilities that are near her home: [facility], [facility], and [facility]. SW sent referrals to these facilities. Per discussion w/ Mei Ramon PA-C, patient would be medically stable for discharge tomorrow (Saturday 03/24).    SW attempted to follow-up w/ patient to let her know that referrals had been sent per her request. Patient was working w/ PT at time of visit. SW did provide update to bedside RN who reported that he would let patient know that SW has sent referrals per her request and following discussion w/ Adamaris.    JEFF Bull, JAMAL  7D Oncology SW  Phone [phone]  Pager [phone]

## 2018-03-23 NOTE — PROGRESS NOTES
03/23/18 1345   Quick Adds   Type of Visit Initial Occupational Therapy Evaluation   Living Environment   Lives With alone   Living Arrangements apartment   Home Accessibility bed and bath on same level   Number of Stairs to Enter Home 12   Number of Stairs Within Home 0   Stair Railings at Home outside, present at both sides   Transportation Available family or friend will provide   Living Environment Comment Pt lives on main floor of apartment but has to complete ~12 stairs to access main door. Pt reports having to complete flight of stairs to basement for laundry   Self-Care   Dominant Hand right   Usual Activity Tolerance moderate   Current Activity Tolerance fair   Regular Exercise other (see comments)  (Pt reports completing UE exercises)   Activity/Exercise/Self-Care Comment Pt declines use of any equipment. Per chart review as 4WW and cane. Pt reports sitting down in tub vs showering for bathing.     Functional Level Prior   Ambulation 0-->independent   Transferring 0-->independent   Toileting 0-->independent   Bathing 0-->independent   Dressing 0-->independent   Eating 0-->independent   Communication 0-->understands/communicates without difficulty   Swallowing 0-->swallows foods/liquids without difficulty   Cognition 1 - attention or memory deficits   Fall history within last six months yes   Number of times patient has fallen within last six months 2   Which of the above functional risks had a recent onset or change? ambulation;transferring;toileting;bathing;dressing;fall history   Prior Functional Level Comment Pt reports independent with all I/ADLs PTA. Pt reports her sisters will be able to provide some assist and pt plans on possibly discharging from TCU to sister's home for some time. Pt also states another resident in apartment can help with laundry.   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 \"   Precautions/Limitations fall precautions   Weight-Bearing Status - LUE full weight-bearing   Weight-Bearing Status - RUE full weight-bearing   Weight-Bearing Status - LLE full weight-bearing   Weight-Bearing Status - RLE full weight-bearing   General Info Comments Activity: ambulate with assist   Cognitive Status Examination   Orientation orientation to person, place and time   Cognitive Comment Pt endorses short term memory impairments but feels it has improved/\"things have become more cohesive\"; will continue to monitor as patient occasional tangential and requiring cues to increase focus to task/purpose   Visual Perception   Visual Perception Wears glasses   Visual Perception Comments Endorses increased blurry vision/difficulty focusing with fatigue   Sensory Examination   Sensory Quick Adds No deficits were identified   Integumentary/Edema   Integumentary/Edema Comments Soft pitting swelling in BLEs   Posture   Posture kyphosis;protracted shoulders;forward head position   Range of Motion (ROM)   ROM Comment BUE ROM WFL; RUE moving slowly 2/2 rib fracture   Strength   Strength Comments MMT: LUE 3+/5; graded MMT to RUE 2/2 rib fraction: 3+/5   Hand Strength   Hand Strength Comments good bilateral hand grasp   Coordination   Upper Extremity Coordination No deficits were identified   Mobility   Bed Mobility Bed mobility skill: Sit to supine   Bed Mobility Skill: Sit to Supine   Level of [place]: Sit/Supine moderate assist (50% patients effort)   Physical Assist/Nonphysical Assist: Sit/Supine 2 persons;verbal cues   Assistive Device: Sit/Supine bedrail   Transfer Skill: Sit to Stand   Level of " "[place]: Sit/Stand contact guard   Physical Assist/Nonphysical Assist: Sit/Stand verbal cues   Assistive Device for Transfer: Sit/Stand standard walker   Transfer Skill: Toilet Transfer   Level of [place]: Toilet contact guard   Physical Assist/Nonphysical Assist: Toilet verbal cues   Assistive Device standard walker   Toilet Transfer Skill Comments BSC   Lower Body Dressing   Level of [place]: Dress Lower Body moderate assist (50% patients effort)   Toileting   Level of [place]: Toilet contact guard   Physical Assist/Nonphysical Assist: Toilet verbal cues   Grooming   Level of [place]: Grooming stand-by assist   Physical Assist/Nonphysical Assist: Grooming verbal cues   Activities of Daily Living Analysis   Impairments Contributing to Impaired Activities of Daily Living balance impaired;fear and anxiety;pain;ROM decreased;strength decreased   General Therapy Interventions   Planned Therapy Interventions ADL retraining;IADL retraining;cognition;strengthening;transfer training;home program guidelines;progressive activity/exercise;risk factor education   Clinical Impression   Criteria for Skilled Therapeutic Interventions Met yes, treatment indicated   OT Diagnosis decreased independence in I/ADLs   Influenced by the following impairments pain, functional strength and endurance   Assessment of Occupational Performance 3-5 Performance Deficits   Identified Performance Deficits home management, toileting, dressing, functional mobility   Clinical Decision Making (Complexity) Low complexity   Therapy Frequency 5 times/wk   Predicted Duration of Therapy Intervention (days/wks) 2 weeks   Anticipated Equipment Needs at Discharge other (see comments)  (TBD)   Anticipated Discharge Disposition Transitional Care Facility   Risks and Benefits of Treatment have been explained. Yes   Patient, Family & other staff in agreement with plan of care Yes   [employer] AM-PAC TM \"6 Clicks\"   2016, Trustees " "of [facility], under license to [employer].  All rights reserved.   6 Clicks Short Forms Daily Activity Inpatient Short Form   [facility] AM-PAC  \"6 Clicks\" Daily Activity Inpatient Short Form   1. Putting on and taking off regular lower body clothing? 2 - A Lot   2. Bathing (including washing, rinsing, drying)? 3 - A Little   3. Toileting, which includes using toilet, bedpan or urinal? 3 - A Little   4. Putting on and taking off regular upper body clothing? 3 - A Little   5. Taking care of personal grooming such as brushing teeth? 3 - A Little   6. Eating meals? 3 - A Little   Daily Activity Raw Score (Score out of 24.Lower scores equate to lower levels of function) 17   Total Evaluation Time   Total Evaluation Time (Minutes) 10     "

## 2018-03-23 NOTE — PROGRESS NOTES
Calorie Counts  Intake recorded for: 3/22 Kcals: 606  Protein: 23g  # Meals Recorded: 100% sweet potatoes, 75% mushroom soup, 66% grilled cheese sandwich  # Supplements Recorded: 75% 1 Boost Plus

## 2018-03-23 NOTE — PLAN OF CARE
Problem: Patient Care Overview  Goal: Plan of Care/Patient Progress Review  OT 7D:   Discharge Planner OT   Patient plan for discharge: TCU  Current status: Pt requiring extended time for completion of activities, verbalizing each step of the process throughout and moving very slowly 2/2 fear of increasing pain in ribs. Pt able to complete pericares with VCs for technique (given special wipes and cream) CGA while standing. Max A required for threading BLEs into briefs but patient able to pull up upon standing CGA. Pt tolerating standing at sink ~5 minutes for oral hygiene. Completing functional mobility within room bed <> sink with FWW. Education on safe hand placement provided to facilitate safety with functional transfers. Mod A for sit > supine using log roll technique; suggested trialing bed mobility from L side of bed given increased pain at R ribs but patient declining.   Barriers to return to prior living situation: functional strength and endurance, pain, medical status  Recommendations for discharge: TCU  Rationale for recommendations: Pt will require further OT/PT to maximize safety and independence in I/ADLs and functional mobility as patient below baseline.        Entered by: Brenda Alex 03/23/2018 2:52 PM

## 2018-03-23 NOTE — PLAN OF CARE
Problem: Patient Care Overview  Goal: Plan of Care/Patient Progress Review  Outcome: Improving  Afebrile, vital signs are stable. Oxycodone 5 mg po given x 1 for rib cage pain with improvement. Pain is exacerbated with movements. Loose stool x 2 today. Pyridium given for burning with urination x 1. Pyridium is prn TID. Last radiation treatment performed this morning. Magnesium and phosphorus replaced with rechecks scheduled for tomorrow morning. Awaiting TCU placement.

## 2018-03-24 ENCOUNTER — APPOINTMENT (OUTPATIENT)
Dept: GENERAL RADIOLOGY | Facility: CLINIC | Age: 78
DRG: 393 | End: 2018-03-24
Attending: PHYSICIAN ASSISTANT
Payer: MEDICARE

## 2018-03-24 LAB
ALBUMIN SERPL-MCNC: 1 G/DL (ref 3.4–5)
ALBUMIN UR-MCNC: NEGATIVE MG/DL
ALP SERPL-CCNC: 74 U/L (ref 40–150)
ALT SERPL W P-5'-P-CCNC: 18 U/L (ref 0–50)
ANION GAP SERPL CALCULATED.3IONS-SCNC: 7 MMOL/L (ref 3–14)
APPEARANCE UR: CLEAR
APTT PPP: 39 SEC (ref 22–37)
AST SERPL W P-5'-P-CCNC: 13 U/L (ref 0–45)
BACTERIA #/AREA URNS HPF: ABNORMAL /HPF
BASOPHILS # BLD AUTO: 0 10E9/L (ref 0–0.2)
BASOPHILS NFR BLD AUTO: 0.9 %
BILIRUB SERPL-MCNC: 1 MG/DL (ref 0.2–1.3)
BILIRUB UR QL STRIP: NEGATIVE
BUN SERPL-MCNC: 6 MG/DL (ref 7–30)
CALCIUM SERPL-MCNC: 7.2 MG/DL (ref 8.5–10.1)
CHLORIDE SERPL-SCNC: 105 MMOL/L (ref 94–109)
CO2 SERPL-SCNC: 24 MMOL/L (ref 20–32)
COLOR UR AUTO: ABNORMAL
CREAT SERPL-MCNC: 0.7 MG/DL (ref 0.52–1.04)
DIFFERENTIAL METHOD BLD: ABNORMAL
EOSINOPHIL # BLD AUTO: 0 10E9/L (ref 0–0.7)
EOSINOPHIL NFR BLD AUTO: 0.9 %
ERYTHROCYTE [DISTWIDTH] IN BLOOD BY AUTOMATED COUNT: 15.4 % (ref 10–15)
GFR SERPL CREATININE-BSD FRML MDRD: 81 ML/MIN/1.7M2
GLUCOSE SERPL-MCNC: 86 MG/DL (ref 70–99)
GLUCOSE UR STRIP-MCNC: NEGATIVE MG/DL
HCT VFR BLD AUTO: 29.9 % (ref 35–47)
HGB BLD-MCNC: 9.7 G/DL (ref 11.7–15.7)
HGB UR QL STRIP: NEGATIVE
HYALINE CASTS #/AREA URNS LPF: 14 /LPF (ref 0–2)
INR PPP: 1.29 (ref 0.86–1.14)
KETONES UR STRIP-MCNC: NEGATIVE MG/DL
LEUKOCYTE ESTERASE UR QL STRIP: NEGATIVE
LYMPHOCYTES # BLD AUTO: 0 10E9/L (ref 0.8–5.3)
LYMPHOCYTES NFR BLD AUTO: 0.9 %
MAGNESIUM SERPL-MCNC: 1.8 MG/DL (ref 1.6–2.3)
MCH RBC QN AUTO: 30.4 PG (ref 26.5–33)
MCHC RBC AUTO-ENTMCNC: 32.4 G/DL (ref 31.5–36.5)
MCV RBC AUTO: 94 FL (ref 78–100)
METAMYELOCYTES # BLD: 0.1 10E9/L
METAMYELOCYTES NFR BLD MANUAL: 5.2 %
MONOCYTES # BLD AUTO: 0.3 10E9/L (ref 0–1.3)
MONOCYTES NFR BLD AUTO: 12.3 %
MUCOUS THREADS #/AREA URNS LPF: PRESENT /LPF
NEUTROPHILS # BLD AUTO: 1.8 10E9/L (ref 1.6–8.3)
NEUTROPHILS NFR BLD AUTO: 79.8 %
NITRATE UR QL: NEGATIVE
NRBC # BLD AUTO: 0 10*3/UL
NRBC BLD AUTO-RTO: 2 /100
PH UR STRIP: 5 PH (ref 5–7)
PHOSPHATE SERPL-MCNC: 2.8 MG/DL (ref 2.5–4.5)
PLATELET # BLD AUTO: 103 10E9/L (ref 150–450)
PLATELET # BLD EST: ABNORMAL 10*3/UL
POTASSIUM SERPL-SCNC: 4.2 MMOL/L (ref 3.4–5.3)
PROT SERPL-MCNC: 3.6 G/DL (ref 6.8–8.8)
RBC # BLD AUTO: 3.19 10E12/L (ref 3.8–5.2)
RBC #/AREA URNS AUTO: 1 /HPF (ref 0–2)
RBC MORPH BLD: NORMAL
SODIUM SERPL-SCNC: 136 MMOL/L (ref 133–144)
SOURCE: ABNORMAL
SP GR UR STRIP: 1.01 (ref 1–1.03)
SQUAMOUS #/AREA URNS AUTO: <1 /HPF (ref 0–1)
TRANS CELLS #/AREA URNS HPF: <1 /HPF (ref 0–1)
UROBILINOGEN UR STRIP-MCNC: NORMAL MG/DL (ref 0–2)
WBC # BLD AUTO: 2.2 10E9/L (ref 4–11)
WBC #/AREA URNS AUTO: 1 /HPF (ref 0–5)

## 2018-03-24 PROCEDURE — 85025 COMPLETE CBC W/AUTO DIFF WBC: CPT | Performed by: PHYSICIAN ASSISTANT

## 2018-03-24 PROCEDURE — 36415 COLL VENOUS BLD VENIPUNCTURE: CPT | Performed by: PHYSICIAN ASSISTANT

## 2018-03-24 PROCEDURE — 85610 PROTHROMBIN TIME: CPT | Performed by: PHYSICIAN ASSISTANT

## 2018-03-24 PROCEDURE — 71045 X-RAY EXAM CHEST 1 VIEW: CPT

## 2018-03-24 PROCEDURE — 80053 COMPREHEN METABOLIC PANEL: CPT | Performed by: PHYSICIAN ASSISTANT

## 2018-03-24 PROCEDURE — 85730 THROMBOPLASTIN TIME PARTIAL: CPT | Performed by: PHYSICIAN ASSISTANT

## 2018-03-24 PROCEDURE — 25000132 ZZH RX MED GY IP 250 OP 250 PS 637: Mod: GY | Performed by: PHYSICIAN ASSISTANT

## 2018-03-24 PROCEDURE — 25000128 H RX IP 250 OP 636: Performed by: INTERNAL MEDICINE

## 2018-03-24 PROCEDURE — 25800025 ZZH RX 258: Performed by: PHYSICIAN ASSISTANT

## 2018-03-24 PROCEDURE — 25000132 ZZH RX MED GY IP 250 OP 250 PS 637: Mod: GY | Performed by: INTERNAL MEDICINE

## 2018-03-24 PROCEDURE — 81001 URINALYSIS AUTO W/SCOPE: CPT | Performed by: PHYSICIAN ASSISTANT

## 2018-03-24 PROCEDURE — 12000008 ZZH R&B INTERMEDIATE UMMC

## 2018-03-24 PROCEDURE — 87040 BLOOD CULTURE FOR BACTERIA: CPT | Performed by: PHYSICIAN ASSISTANT

## 2018-03-24 PROCEDURE — 25000125 ZZHC RX 250: Performed by: PHYSICIAN ASSISTANT

## 2018-03-24 PROCEDURE — A9270 NON-COVERED ITEM OR SERVICE: HCPCS | Mod: GY | Performed by: PHYSICIAN ASSISTANT

## 2018-03-24 PROCEDURE — 99233 SBSQ HOSP IP/OBS HIGH 50: CPT | Performed by: INTERNAL MEDICINE

## 2018-03-24 PROCEDURE — 83735 ASSAY OF MAGNESIUM: CPT | Performed by: PHYSICIAN ASSISTANT

## 2018-03-24 PROCEDURE — A9270 NON-COVERED ITEM OR SERVICE: HCPCS | Mod: GY | Performed by: INTERNAL MEDICINE

## 2018-03-24 PROCEDURE — 84100 ASSAY OF PHOSPHORUS: CPT | Performed by: PHYSICIAN ASSISTANT

## 2018-03-24 PROCEDURE — 36592 COLLECT BLOOD FROM PICC: CPT | Performed by: PHYSICIAN ASSISTANT

## 2018-03-24 RX ORDER — FUROSEMIDE 10 MG/ML
40 INJECTION INTRAMUSCULAR; INTRAVENOUS ONCE
Status: COMPLETED | OUTPATIENT
Start: 2018-03-24 | End: 2018-03-24

## 2018-03-24 RX ORDER — METHOCARBAMOL 500 MG/1
500 TABLET, FILM COATED ORAL 4 TIMES DAILY PRN
Status: DISCONTINUED | OUTPATIENT
Start: 2018-03-24 | End: 2018-04-04 | Stop reason: HOSPADM

## 2018-03-24 RX ORDER — ZINC SULFATE 50(220)MG
220 CAPSULE ORAL DAILY
Status: DISCONTINUED | OUTPATIENT
Start: 2018-03-24 | End: 2018-03-29

## 2018-03-24 RX ORDER — FLUORIDE TOOTHPASTE
5-10 TOOTHPASTE DENTAL 4 TIMES DAILY PRN
Status: DISCONTINUED | OUTPATIENT
Start: 2018-03-24 | End: 2018-04-04 | Stop reason: HOSPADM

## 2018-03-24 RX ORDER — MULTIVITAMIN,THERAPEUTIC
1 TABLET ORAL DAILY
Status: DISCONTINUED | OUTPATIENT
Start: 2018-03-24 | End: 2018-04-04 | Stop reason: HOSPADM

## 2018-03-24 RX ORDER — FUROSEMIDE 10 MG/ML
20 INJECTION INTRAMUSCULAR; INTRAVENOUS
Status: DISCONTINUED | OUTPATIENT
Start: 2018-03-24 | End: 2018-03-25

## 2018-03-24 RX ADMIN — DIPHENOXYLATE HYDROCHLORIDE AND ATROPINE SULFATE 1 TABLET: 2.5; .025 TABLET ORAL at 12:09

## 2018-03-24 RX ADMIN — ZINC SULFATE CAP 220 MG (50 MG ELEMENTAL ZN) 220 MG: 220 (50 ZN) CAP at 09:09

## 2018-03-24 RX ADMIN — FUROSEMIDE 20 MG: 10 INJECTION, SOLUTION INTRAVENOUS at 16:17

## 2018-03-24 RX ADMIN — HYDROCORTISONE: 25 CREAM TOPICAL at 20:54

## 2018-03-24 RX ADMIN — DICLOFENAC SODIUM 2 G: 10 GEL TOPICAL at 12:09

## 2018-03-24 RX ADMIN — DICLOFENAC SODIUM 2 G: 10 GEL TOPICAL at 18:18

## 2018-03-24 RX ADMIN — OXYCODONE HYDROCHLORIDE 5 MG: 5 TABLET ORAL at 03:21

## 2018-03-24 RX ADMIN — Medication 2 G: at 09:59

## 2018-03-24 RX ADMIN — HYDROCORTISONE: 25 CREAM TOPICAL at 09:08

## 2018-03-24 RX ADMIN — METHOCARBAMOL 500 MG: 500 TABLET ORAL at 09:09

## 2018-03-24 RX ADMIN — THERA TABS 1 TABLET: TAB at 09:09

## 2018-03-24 RX ADMIN — B-COMPLEX W/ C & FOLIC ACID TAB 1 TABLET: TAB at 09:09

## 2018-03-24 RX ADMIN — LOPERAMIDE HYDROCHLORIDE 2 MG: 2 CAPSULE ORAL at 20:54

## 2018-03-24 RX ADMIN — CIPROFLOXACIN HYDROCHLORIDE 500 MG: 500 TABLET, FILM COATED ORAL at 20:54

## 2018-03-24 RX ADMIN — CIPROFLOXACIN HYDROCHLORIDE 500 MG: 500 TABLET, FILM COATED ORAL at 09:09

## 2018-03-24 RX ADMIN — ENOXAPARIN SODIUM 40 MG: 40 INJECTION SUBCUTANEOUS at 20:54

## 2018-03-24 RX ADMIN — DIPHENOXYLATE HYDROCHLORIDE AND ATROPINE SULFATE 1 TABLET: 2.5; .025 TABLET ORAL at 20:54

## 2018-03-24 RX ADMIN — DIPHENOXYLATE HYDROCHLORIDE AND ATROPINE SULFATE 1 TABLET: 2.5; .025 TABLET ORAL at 09:09

## 2018-03-24 RX ADMIN — LIDOCAINE AND PRILOCAINE: 25; 25 CREAM TOPICAL at 18:12

## 2018-03-24 RX ADMIN — DIPHENOXYLATE HYDROCHLORIDE AND ATROPINE SULFATE 1 TABLET: 2.5; .025 TABLET ORAL at 16:17

## 2018-03-24 RX ADMIN — FUROSEMIDE 40 MG: 10 INJECTION, SOLUTION INTRAVENOUS at 10:25

## 2018-03-24 RX ADMIN — ACETAMINOPHEN 650 MG: 325 TABLET, FILM COATED ORAL at 00:33

## 2018-03-24 RX ADMIN — OXYCODONE HYDROCHLORIDE 5 MG: 5 TABLET ORAL at 09:09

## 2018-03-24 RX ADMIN — Medication 1 TABLET: at 09:09

## 2018-03-24 RX ADMIN — Medication 6 MG: at 01:43

## 2018-03-24 RX ADMIN — POTASSIUM CHLORIDE, DEXTROSE MONOHYDRATE AND SODIUM CHLORIDE: 150; 5; 450 INJECTION, SOLUTION INTRAVENOUS at 21:20

## 2018-03-24 ASSESSMENT — PAIN DESCRIPTION - DESCRIPTORS
DESCRIPTORS: ACHING
DESCRIPTORS: SORE

## 2018-03-24 NOTE — PROGRESS NOTES
Calorie Counts    Intake recorded for: 3/23   Kcals: 0 Protein: 0g    # meals recorded: 1 meal ordered from kitchen, no food intake recorded.     # supplements recorded: 0

## 2018-03-24 NOTE — PLAN OF CARE
Problem: Patient Care Overview  Goal: Plan of Care/Patient Progress Review    Oxygen weaned to off and was maintaining sats in the mid 90s until she laid flat in bed and her sats dipped to 88%. Oxygen on intermittently. Pericare performed and lidocaine ointment applied to rectum and aquaphor ointment to periarea. Interdry ordered to place between skin folds. 2 loose/jelly-like green stools. Ambulated with gait belt, walker and SBA outside of room where she needed to sit down secondary to fatigue and weakness. She states she is weaker today compared with yesterday. She sat for about 5 min before walking back to her room and returning to bed. UA/UC sent.

## 2018-03-24 NOTE — PLAN OF CARE
Problem: Patient Care Overview  Goal: Plan of Care/Patient Progress Review  OT 7D: Attempted to see pt x3 this date, pt with other providers at all attempts.  Will cx and reschedule for 3/25.

## 2018-03-24 NOTE — PLAN OF CARE
Problem: Diarrhea (Adult)  Goal: Identify Related Risk Factors and Signs and Symptoms  Related risk factors and signs and symptoms are identified upon initiation of Human [employer] Clinical Practice Guideline (CPG).   Outcome: No Change  VSS, ex 2L O2 NC. D5 1/2 NS w/20 K @ 100mL/hr infusing to R) port. Oxy x 2, tylenol x 1 for pain. Many incontinent urine/stools, with burning pain. Using call light appropriately. Will continue w/POC.

## 2018-03-24 NOTE — PLAN OF CARE
Problem: Patient Care Overview  Goal: Plan of Care/Patient Progress Review  Outcome: Declining  Afebrile, blood pressures are stable. Initially tachycardic this morning and requiring increased oxygen requirements. Patient went from 2 LPM via nasal cannula to 6 LPM via nasal cannula. BLE and abdominal pitting edema. Lung sounds wet. IVF discontinued and lasix 40 mg administered with improvement. Chest x-ray obtained. Currently weaning oxygen and is at 5 LPM via nasal cannula. Lower extremity edema is improving as well. Incentive spirometer at bedside and patient is using this approximately q2h. Burning with urination is improving per patient report. Loose bowel movement x 1. Incontinent of both urine and stool. Will need urine sample when able to obtain. Perineal and coccyx area with significant redness and excoriation. Avoiding irritants such as wet wipes with alcohol. Using water to clean up patient after incontinent episodes. Hydrocortisone cream as ordered and Aquaphor in between per WOCN recommendation. Magnesium replaced per conditional orders with recheck scheduled for tomorrow morning. On calorie counts, but not eating. Encouraged to eat. Drinking plenty of water.     Addendum: Joiner successfully placed for wound healing at 1500 hours.

## 2018-03-24 NOTE — PROGRESS NOTES
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     #Intractable diarrhea --> improving  #Nausea  #Dehydration --> improving  #Electrolyte derangements --> improving  -Suspect nausea, diarrhea secondary to mucositis from recent chemotherapy (5-FU) and XRT.   -C.diff and enteric panel negative.  -Continue scheduled Lomotil. Imodium available PRN. Added tincture of opium, which seemed to be helping until overnight 3/23-3/24. Holding tincture of opium at this time due to confusion (see below).  -Antiemetics also available (Zofran, Compazine). No vomiting since admission.  -IV fluids at 100ml/hr and electrolyte replacement PRN per sliding scale.    #Altered mental status  -Nonsensical statements this morning, disoriented. Did get oxycodone and tincture of opium overnight (not given at same time), which may be causative factor. Holding both of these medications at this time. Blood cultures drawn and are pending. Also awaiting UA. CXR pending.    #Hypoxemia  -Developed overnight 3/23-3/24. Hypoalbuminemic with signs of third spacing. Suspect secondary to fluid overload. Dosing with IV Lasix today. May need Joiner placement for more accurate I&O, patient declines at this time but low threshold to place if AMS, hypoxemia worsens. CXR ordered and is pending.    #Pancytopenia  -Secondary to underlying disease, recent chemo/XRT  -Transfuse for Hgb <8, PLT <10K - no transfusion needs thus far    #Coagulopathy  -Suspect secondary to malnutrition. Dosing daily with vitamin K PRN.    #Weakness  #Fall at home  #Fractured R 8th rib  -Suspect weakness, falls secondary to above issues. Patient reports she had never received tincture of  opium as outpatient so do not suspect this as contributing factor.  -No history of head trauma or LOC. Alert and oriented today. Will closely monitor and obtain head CT/MRI if needed.   -Fall precautions  -PT/OT --> encouraged patient to work with them, she is very motivated but struggling with pain and weakness. Holding oxy and tincture of opium as above.  -Also has Tylenol, ketamine/gabapentin/lidocaine and diclofenac available topically, which she finds helpful, so will continue.    #UTI (Klebsiella pneumoniae & proteus mirabilis)  -Continue Cipro (x3/21) 500mg BID, plan for 10 days of therapy (x3/21-3/30).     #Stage IIIA (T2 N1) SCC of anus  -Followed by Dr. Ray  -S/p 1 cycle 5-FU + mitomycin followed by 1 cycle single-agent 5-FU (d/t toxicity and age) with concurrent radiation.   -Completed radiation 3/23.  -Supportive rectal cares ordered  -Intermittently expressing feelings of hopelessness and feeling at peace with dying, not wanting to continue with treatment. Consulted palliative care SW for assistance with illness coping and decision making, appreciate their assistance. Feeling much more hopeful today now that she has completed XRT, will continue supportive cares, she has f/u appt with Dr. Ray in ~2 weeks.    #Malnutrition, severity unspecified with hypoalbuminemia  -Calorie counts started 3/22  -Suspect lack of good PO intake is contributing to impaired wound healing   -PO intake improving, no plans for TPN at this time (do not think she would tolerate enterals d/t diarrhea)    #FEN  -MIVF with D5 1/2NS and 20 mEq K+ @ 100ml/hr  -Replace lytes PRN per protocol  -RDAT - calorie counts    #Prophy  -Lovenox 40mg daily, follow coags    #Pain Assessment:  Current Pain Score 3/24/2018   Patient currently in pain? yes   Pain score (0-10) -   Pain location Rib cage   Pain descriptors Aching   - Elizabeth is experiencing pain due to fractured rib, radiation treatment. Pain management was discussed and the plan  "was created in a collaborative fashion.  Elizabeth's response to the current recommendations: engaged  compliant  - Opioid regimen: Oxycodone 5mg every 4 hours PRN - on hold d/t AMS  - Response to opioid medications: Has not tried yet  - Bowel regimen: not needed  - Pharmacologic adjuvants: topical diclofenac gel, topical ketamine/gabapentin/lidocaine cream    Dispo: Anticipate d/c to TCU in the next 2-3 days pending control of pain, diarrhea, mental status, hypoxemia, and nutritional status.    Patient and plan of care discussed with staff attending, Dr. Howe.     NONA LundbergC  Hematology/Oncology  [phone]    Interval History   Confused this morning. Reports \"I'm dying\" and \"I'm watching myself die.\" Unable to tell me month, year, knows she is in the hospital. Disjointed train of thought.    Physical Exam   Temp: 98.4  F (36.9  C) Temp src: Oral BP: 137/59 Pulse: 90 Heart Rate: 89 Resp: 18 SpO2: 96 % O2 Device: Nasal cannula Oxygen Delivery: 5 LPM  Vitals:    03/20/18 0744 03/21/18 2030 03/23/18 1652   Weight: 57.6 kg (126 lb 14.4 oz) 60.5 kg (133 lb 4.8 oz) 62.9 kg (138 lb 11.2 oz)     Vital Signs with Ranges  Temp:  [97.9  F (36.6  C)-98.8  F (37.1  C)] 98.4  F (36.9  C)  Pulse:  [90] 90  Heart Rate:  [] 89  Resp:  [18-20] 18  BP: (102-137)/(49-67) 137/59  SpO2:  [85 %-97 %] 96 %  I/O last 3 completed shifts:  In: 1100 [I.V.:1100]  Out: 600 [Urine:600]    Constitutional: Elderly female seen lying in bed, confused.  Eyes: Lids and lashes normal, pupils equal, round and reactive to light, extra ocular muscles intact, sclera clear, conjunctiva normal.  ENT: Normocephalic, without obvious abnormality, atraumatic.  Respiratory: On oxygen via NC, no increased work of breathing, crackles to bases..  Cardiovascular: Regular rate and rhythm and no murmur noted.  GI: + bowel sounds, soft, non-tender, non-distended.  Musculoskeletal: 2+ pitting edema noted to B/L LE.  Neurologic: No focal " deficits.  Neuropsychiatric: Calm, normal eye contact, alert, normal affect, oriented to self, place, time and situation, memory for past and recent events intact and thought process normal. Pleasant and cooperative.    Medications     dextrose 5% and 0.45% NaCl + KCl 20 mEq/L 30 mL/hr at 03/24/18 0909     - MEDICATION INSTRUCTIONS -         multivitamin, therapeutic  1 tablet Oral Daily     zinc sulfate  220 mg Oral Daily     furosemide  20 mg Intravenous BID     diphenoxylate-atropine  1 tablet Oral 4x Daily     ciprofloxacin  500 mg Oral Q12H OSCAR     sodium chloride (PF)  3 mL Intracatheter Q8H     calcium-vitamin D  1 tablet Oral Daily     hydrocortisone   Rectal BID     vitamin B complex with vitamin C  1 tablet Oral Daily     enoxaparin  40 mg Subcutaneous Q24H       Data   Results for orders placed or performed during the hospital encounter of 03/19/18 (from the past 24 hour(s))   INR   Result Value Ref Range    INR 1.29 (H) 0.86 - 1.14   Partial thromboplastin time   Result Value Ref Range    PTT 39 (H) 22 - 37 sec   CBC with platelets differential   Result Value Ref Range    WBC 2.2 (L) 4.0 - 11.0 10e9/L    RBC Count 3.19 (L) 3.8 - 5.2 10e12/L    Hemoglobin 9.7 (L) 11.7 - 15.7 g/dL    Hematocrit 29.9 (L) 35.0 - 47.0 %    MCV 94 78 - 100 fl    MCH 30.4 26.5 - 33.0 pg    MCHC 32.4 31.5 - 36.5 g/dL    RDW 15.4 (H) 10.0 - 15.0 %    Platelet Count 103 (L) 150 - 450 10e9/L    Diff Method Manual Differential     % Neutrophils 79.8 %    % Lymphocytes 0.9 %    % Monocytes 12.3 %    % Eosinophils 0.9 %    % Basophils 0.9 %    % Metamyelocytes 5.2 %    Nucleated RBCs 2 (H) 0 /100    Absolute Neutrophil 1.8 1.6 - 8.3 10e9/L    Absolute Lymphocytes 0.0 (L) 0.8 - 5.3 10e9/L    Absolute Monocytes 0.3 0.0 - 1.3 10e9/L    Absolute Eosinophils 0.0 0.0 - 0.7 10e9/L    Absolute Basophils 0.0 0.0 - 0.2 10e9/L    Absolute Metamyelocytes 0.1 (H) 0 10e9/L    Absolute Nucleated RBC 0.0     RBC Morphology Normal     Platelet  Estimate Confirming automated cell count    Comprehensive metabolic panel   Result Value Ref Range    Sodium 136 133 - 144 mmol/L    Potassium 4.2 3.4 - 5.3 mmol/L    Chloride 105 94 - 109 mmol/L    Carbon Dioxide 24 20 - 32 mmol/L    Anion Gap 7 3 - 14 mmol/L    Glucose 86 70 - 99 mg/dL    Urea Nitrogen 6 (L) 7 - 30 mg/dL    Creatinine 0.70 0.52 - 1.04 mg/dL    GFR Estimate 81 >60 mL/min/1.7m2    GFR Estimate If Black >90 >60 mL/min/1.7m2    Calcium 7.2 (L) 8.5 - 10.1 mg/dL    Bilirubin Total 1.0 0.2 - 1.3 mg/dL    Albumin 1.0 (L) 3.4 - 5.0 g/dL    Protein Total 3.6 (L) 6.8 - 8.8 g/dL    Alkaline Phosphatase 74 40 - 150 U/L    ALT 18 0 - 50 U/L    AST 13 0 - 45 U/L   Magnesium   Result Value Ref Range    Magnesium 1.8 1.6 - 2.3 mg/dL   Phosphorus   Result Value Ref Range    Phosphorus 2.8 2.5 - 4.5 mg/dL   Blood culture   Result Value Ref Range    Specimen Description Blood Portacath     Culture Micro No growth after 2 hours    Blood culture   Result Value Ref Range    Specimen Description Blood Left Arm     Culture Micro No growth after 2 hours

## 2018-03-24 NOTE — PLAN OF CARE
Problem: Patient Care Overview  Goal: Plan of Care/Patient Progress Review  Outcome: No Change  A&O. VSS ex 2L NC satting 97%. O2 sat varies 89-94 on RA, more often 91-92. Oxycodone 5 mg po and volteran gel given x 1 for rib pain with improvement. Pain is exacerbated with movements. No BM this shift. Urinate x3 w/ burning. Charge RN paged provider about abd distension and I/O. Mod edema in legs, elbows seem swollen as well. Pyridium is prn TID. Magnesium and phosphorus rechecks scheduled for tomorrow morning. Ambulated in guaman x2 this shift. Awaiting TCU placement. Continue with POC.     Per MD- given lasix, held lomotil.

## 2018-03-24 NOTE — PROGRESS NOTES
MOONLIGHT NOTE    Called by RN to relay pt request for Joiner catheter. She is urinating frequently d/t lasix and it is causing incontinence and excoriation. Order placed.       Olimpia Smith MD

## 2018-03-25 ENCOUNTER — ONCOLOGY VISIT (OUTPATIENT)
Dept: RADIATION ONCOLOGY | Facility: CLINIC | Age: 78
End: 2018-03-25

## 2018-03-25 ENCOUNTER — APPOINTMENT (OUTPATIENT)
Dept: PHYSICAL THERAPY | Facility: CLINIC | Age: 78
DRG: 393 | End: 2018-03-25
Payer: MEDICARE

## 2018-03-25 ENCOUNTER — APPOINTMENT (OUTPATIENT)
Dept: OCCUPATIONAL THERAPY | Facility: CLINIC | Age: 78
DRG: 393 | End: 2018-03-25
Payer: MEDICARE

## 2018-03-25 LAB
ALBUMIN SERPL-MCNC: 1.1 G/DL (ref 3.4–5)
ALP SERPL-CCNC: 94 U/L (ref 40–150)
ALT SERPL W P-5'-P-CCNC: 18 U/L (ref 0–50)
ANION GAP SERPL CALCULATED.3IONS-SCNC: 8 MMOL/L (ref 3–14)
APTT PPP: 38 SEC (ref 22–37)
AST SERPL W P-5'-P-CCNC: 14 U/L (ref 0–45)
BASOPHILS # BLD AUTO: 0 10E9/L (ref 0–0.2)
BASOPHILS NFR BLD AUTO: 0.9 %
BILIRUB SERPL-MCNC: 0.9 MG/DL (ref 0.2–1.3)
BUN SERPL-MCNC: 7 MG/DL (ref 7–30)
CALCIUM SERPL-MCNC: 7.9 MG/DL (ref 8.5–10.1)
CHLORIDE SERPL-SCNC: 101 MMOL/L (ref 94–109)
CO2 SERPL-SCNC: 25 MMOL/L (ref 20–32)
CREAT SERPL-MCNC: 0.68 MG/DL (ref 0.52–1.04)
DIFFERENTIAL METHOD BLD: ABNORMAL
EOSINOPHIL # BLD AUTO: 0 10E9/L (ref 0–0.7)
EOSINOPHIL NFR BLD AUTO: 0.8 %
ERYTHROCYTE [DISTWIDTH] IN BLOOD BY AUTOMATED COUNT: 15.2 % (ref 10–15)
GFR SERPL CREATININE-BSD FRML MDRD: 83 ML/MIN/1.7M2
GLUCOSE SERPL-MCNC: 89 MG/DL (ref 70–99)
HCT VFR BLD AUTO: 33.9 % (ref 35–47)
HGB BLD-MCNC: 11 G/DL (ref 11.7–15.7)
INR PPP: 1.53 (ref 0.86–1.14)
LYMPHOCYTES # BLD AUTO: 0.1 10E9/L (ref 0.8–5.3)
LYMPHOCYTES NFR BLD AUTO: 1.7 %
MAGNESIUM SERPL-MCNC: 1.8 MG/DL (ref 1.6–2.3)
MCH RBC QN AUTO: 30.5 PG (ref 26.5–33)
MCHC RBC AUTO-ENTMCNC: 32.4 G/DL (ref 31.5–36.5)
MCV RBC AUTO: 94 FL (ref 78–100)
METAMYELOCYTES # BLD: 0 10E9/L
METAMYELOCYTES NFR BLD MANUAL: 0.9 %
MONOCYTES # BLD AUTO: 0.3 10E9/L (ref 0–1.3)
MONOCYTES NFR BLD AUTO: 6.1 %
MYELOCYTES # BLD: 0 10E9/L
MYELOCYTES NFR BLD MANUAL: 0.9 %
NEUTROPHILS # BLD AUTO: 3.9 10E9/L (ref 1.6–8.3)
NEUTROPHILS NFR BLD AUTO: 88.7 %
PLATELET # BLD AUTO: 114 10E9/L (ref 150–450)
PLATELET # BLD EST: ABNORMAL 10*3/UL
POTASSIUM SERPL-SCNC: 3.5 MMOL/L (ref 3.4–5.3)
PROT SERPL-MCNC: 4 G/DL (ref 6.8–8.8)
RBC # BLD AUTO: 3.61 10E12/L (ref 3.8–5.2)
RBC MORPH BLD: NORMAL
SODIUM SERPL-SCNC: 134 MMOL/L (ref 133–144)
WBC # BLD AUTO: 4.4 10E9/L (ref 4–11)

## 2018-03-25 PROCEDURE — 80053 COMPREHEN METABOLIC PANEL: CPT | Performed by: PHYSICIAN ASSISTANT

## 2018-03-25 PROCEDURE — 40000193 ZZH STATISTIC PT WARD VISIT: Performed by: PHYSICAL THERAPIST

## 2018-03-25 PROCEDURE — 85730 THROMBOPLASTIN TIME PARTIAL: CPT | Performed by: PHYSICIAN ASSISTANT

## 2018-03-25 PROCEDURE — 25000125 ZZHC RX 250: Performed by: PHYSICIAN ASSISTANT

## 2018-03-25 PROCEDURE — 12000008 ZZH R&B INTERMEDIATE UMMC

## 2018-03-25 PROCEDURE — 25000132 ZZH RX MED GY IP 250 OP 250 PS 637: Mod: GY | Performed by: PHYSICIAN ASSISTANT

## 2018-03-25 PROCEDURE — 99232 SBSQ HOSP IP/OBS MODERATE 35: CPT | Performed by: INTERNAL MEDICINE

## 2018-03-25 PROCEDURE — A9270 NON-COVERED ITEM OR SERVICE: HCPCS | Mod: GY | Performed by: INTERNAL MEDICINE

## 2018-03-25 PROCEDURE — 97535 SELF CARE MNGMENT TRAINING: CPT | Mod: GO

## 2018-03-25 PROCEDURE — 36592 COLLECT BLOOD FROM PICC: CPT | Performed by: PHYSICIAN ASSISTANT

## 2018-03-25 PROCEDURE — 25000132 ZZH RX MED GY IP 250 OP 250 PS 637: Mod: GY | Performed by: INTERNAL MEDICINE

## 2018-03-25 PROCEDURE — 97530 THERAPEUTIC ACTIVITIES: CPT | Mod: GP | Performed by: PHYSICAL THERAPIST

## 2018-03-25 PROCEDURE — 25000128 H RX IP 250 OP 636: Performed by: INTERNAL MEDICINE

## 2018-03-25 PROCEDURE — 83735 ASSAY OF MAGNESIUM: CPT | Performed by: PHYSICIAN ASSISTANT

## 2018-03-25 PROCEDURE — 25000128 H RX IP 250 OP 636: Performed by: PHYSICIAN ASSISTANT

## 2018-03-25 PROCEDURE — 85025 COMPLETE CBC W/AUTO DIFF WBC: CPT | Performed by: PHYSICIAN ASSISTANT

## 2018-03-25 PROCEDURE — 85610 PROTHROMBIN TIME: CPT | Performed by: PHYSICIAN ASSISTANT

## 2018-03-25 PROCEDURE — 40000133 ZZH STATISTIC OT WARD VISIT

## 2018-03-25 PROCEDURE — A9270 NON-COVERED ITEM OR SERVICE: HCPCS | Mod: GY | Performed by: PHYSICIAN ASSISTANT

## 2018-03-25 RX ADMIN — POTASSIUM CHLORIDE 20 MEQ: 29.8 INJECTION, SOLUTION INTRAVENOUS at 17:20

## 2018-03-25 RX ADMIN — Medication 1 TABLET: at 10:15

## 2018-03-25 RX ADMIN — DIPHENOXYLATE HYDROCHLORIDE AND ATROPINE SULFATE 1 TABLET: 2.5; .025 TABLET ORAL at 21:27

## 2018-03-25 RX ADMIN — HYDROCORTISONE: 25 CREAM TOPICAL at 21:26

## 2018-03-25 RX ADMIN — HYDROCORTISONE: 25 CREAM TOPICAL at 10:17

## 2018-03-25 RX ADMIN — LOPERAMIDE HYDROCHLORIDE 2 MG: 2 CAPSULE ORAL at 21:27

## 2018-03-25 RX ADMIN — DIPHENOXYLATE HYDROCHLORIDE AND ATROPINE SULFATE 1 TABLET: 2.5; .025 TABLET ORAL at 13:51

## 2018-03-25 RX ADMIN — LOPERAMIDE HYDROCHLORIDE 2 MG: 2 CAPSULE ORAL at 10:16

## 2018-03-25 RX ADMIN — LOPERAMIDE HYDROCHLORIDE 2 MG: 2 CAPSULE ORAL at 17:19

## 2018-03-25 RX ADMIN — DIPHENHYDRAMINE HYDROCHLORIDE AND LIDOCAINE HYDROCHLORIDE AND ALUMINUM HYDROXIDE AND MAGNESIUM HYDRO 10 ML: KIT at 08:36

## 2018-03-25 RX ADMIN — DIPHENOXYLATE HYDROCHLORIDE AND ATROPINE SULFATE 1 TABLET: 2.5; .025 TABLET ORAL at 10:15

## 2018-03-25 RX ADMIN — ENOXAPARIN SODIUM 40 MG: 40 INJECTION SUBCUTANEOUS at 21:26

## 2018-03-25 RX ADMIN — Medication 2 G: at 21:27

## 2018-03-25 RX ADMIN — CIPROFLOXACIN HYDROCHLORIDE 500 MG: 500 TABLET, FILM COATED ORAL at 10:16

## 2018-03-25 RX ADMIN — DIPHENOXYLATE HYDROCHLORIDE AND ATROPINE SULFATE 1 TABLET: 2.5; .025 TABLET ORAL at 17:20

## 2018-03-25 RX ADMIN — B-COMPLEX W/ C & FOLIC ACID TAB 1 TABLET: TAB at 10:16

## 2018-03-25 RX ADMIN — LOPERAMIDE HYDROCHLORIDE 2 MG: 2 CAPSULE ORAL at 13:51

## 2018-03-25 RX ADMIN — CIPROFLOXACIN HYDROCHLORIDE 500 MG: 500 TABLET, FILM COATED ORAL at 21:27

## 2018-03-25 RX ADMIN — ZINC SULFATE CAP 220 MG (50 MG ELEMENTAL ZN) 220 MG: 220 (50 ZN) CAP at 10:16

## 2018-03-25 RX ADMIN — THERA TABS 1 TABLET: TAB at 10:16

## 2018-03-25 ASSESSMENT — PAIN DESCRIPTION - DESCRIPTORS
DESCRIPTORS: SORE
DESCRIPTORS: ACHING
DESCRIPTORS: ACHING

## 2018-03-25 NOTE — PLAN OF CARE
Problem: Diarrhea (Adult)  Goal: Identify Related Risk Factors and Signs and Symptoms  Related risk factors and signs and symptoms are identified upon initiation of [employer] Clinical Practice Guideline (CPG).   Outcome: No Change  Continues with diarrhea so so given Imodium PRN and is on scheduled Lomotil. Skin is perineum and buttock redenned due to radiation therapy so using Aquaphor on skin. Denies nausea. Trying to eat. Please encourage her to get out of bed and turn. Up with assitance of one and use of walker.

## 2018-03-25 NOTE — PLAN OF CARE
Problem: Patient Care Overview  Goal: Plan of Care/Patient Progress Review  Discharge Planner PT   Patient plan for discharge: TCU  Current status: Pt tx sit<>stand x 3 reps with FWW and CGA-min A pending seat height. Pt tx sit>supine with max A and VCs for sequencing. Pt bridging and scooting laterally within bed with min A. Pt intermittently dizzy throughout session; BP 92/64 mmHg (MAP 67).   Barriers to return to prior living situation: Level of assist, pain, decreased activity tolerance. Stairs to access home.  Recommendations for discharge: TCU  Rationale for recommendations: Pt would benefit from continued therapy to progress IND and endurance with bed mobility, transfers and gait prior to return to home.        Entered by: Felisha Beebe 03/25/2018 5:30 PM

## 2018-03-25 NOTE — PLAN OF CARE
Problem: Patient Care Overview  Goal: Plan of Care/Patient Progress Review  OT 7D  Discharge Planner OT   Patient plan for discharge: TCU  Current status: Pt requiring increased time for all movements 2/2 pain and need to verbally sequence all steps prior to and during each activity. Pt benefiting from setting goals for session prior to starting but requiring some encouragement to progress activity. Pt requiring reinforcement of education with all functional transfers regarding safe hand placement. Pt supine > sitting EOB min A. STS CGA and completing pivot transfer to [facility] CGA with FWW. STS from [facility] x 3 CGA. Pt dependent for pericares following bowel management and 2/2 pain, experiencing increase in heart to 124 and decreased in SpO2 to 88%; cues and demonstration for PLBing to promote relaxation. Pt endorsing dizziness with all standing activity, dropping to 84/52. Pt declining sitting up in chair for breakfast. Sit > supine mod A x 2 and dependent for repositioning.   Barriers to return to prior living situation: functional strength and endurance, pain in R ribs and periareas  Recommendations for discharge: TCU  Rationale for recommendations: Pt will benefit from further OT/PT to facilitate functional strength and endurance and implement compensatory strategies as needed.        Entered by: Brenda Alex 03/25/2018 9:57 AM

## 2018-03-25 NOTE — PLAN OF CARE
Problem: Patient Care Overview  Goal: Plan of Care/Patient Progress Review    [id number]:  VSS, afebrile; continued on 2LPM via NC overnight-sating mid 90s. Pt continues w/ incontinent loose BMs x3. Continues w/ scheduled lomotil, PRN imodium given. C/o perianal soreness, incontinence care provided, creams applied, Interdry placed b/w skin folds. C/o R. Arm/rib cage aches managed w/ heating pad. Continue to monitor & w/ POC.

## 2018-03-25 NOTE — PROGRESS NOTES
Calorie Counts    Intake recorded for: 3/24  Kcal: 90 Protein: 1g    # meals recorded: 100% cream of mushroom soup    # supplements recorded: 0

## 2018-03-25 NOTE — PROGRESS NOTES
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     Hospital complications:    #UTI (Klebsiella pneumoniae & proteus mirabilis)  -Continue Cipro (x3/21) 500mg BID, plan for 10 days of therapy (x3/21-3/30).  -Repeat UA continues to be dirty with hyaline casts, didn't reflex to culture.    #Stage IIIA (T2 N1) SCC of anus  -Followed by Dr. Ray  -S/p 1 cycle 5-FU + mitomycin (D1 2/12) followed by 1 cycle single-agent 5-FU (d/t toxicity and age, D29 3/11) with concurrent radiation  -Completed radiation 3/23  -Supportive rectal cares ordered  -Intermittently expressing feelings of hopelessness and feeling at peace with dying, not wanting to continue with treatment. Consulted palliative care SW for assistance with illness coping and decision making, appreciate their assistance. Feeling more hopefulnow that she has completed XRT, will continue supportive cares, she has f/u appt with Dr. Ray in ~2 weeks.    #Altered mental status >> improved/resolved likely r/t MEDS 3/25 AM  -Nonsensical statements this morning, disoriented. 3/24 AM.  -Was getting concurrent oxycodone/opium tincture. Will now hold.  -Hypoxia 2/2 to volume overload.  -Blood culture: NTD  -Urine culture: 3/19 with klebsiella PNA on ciprofloxacin  -CXR with pleural effusions.     #Hypoxemia >> improving, now on 2 L NC likely 2/2 to volume overload/pulmonary edema & b/l Pleural effusions  Developed overnight 3/23-3/24. Hypoalbuminemic with signs of third spacing. Suspect secondary to fluid overload. S/P farias placement. CXR 3/24 10 am with small bilateral layering pleural effusions with overlying atelectasis/consolidation.   - lasix 20 mg BID on 3/24-25, now d/c as dizzy with ambulation &  respiratory status improved  - IVF now 0-30 ml/hr PRN as needed to run with concurrent IV medications    Malignancy/Treatment related (chemotherapy/XRT) complications:    #Intractable diarrhea  #Nausea  #Dehydration  #Electrolyte derangements  -Suspect nausea, diarrhea secondary to mucositis from recent chemotherapy (5-FU) and XRT. C diff & enteric panel were negative. .  -Continue scheduled Lomotil. Imodium available PRN.   -Added tincture of opium, which seemed to be helping until overnight 3/23-3/24. Holding tincture of opium at this time due to confusion (see below).  -Antiemetics also available (Zofran, Compazine). No vomiting since admission.  -High lyte SS.    #Pancytopenia  -Secondary to underlying disease, recent chemo/XRT  -Transfuse for Hgb <8, PLT <10K - no transfusion needs thus far    #Weakness  #Fall at home  #Fractured R 8th rib  -Suspect weakness, falls secondary to above issues.  -Fall precautions  -PT/OT --> encouraged patient to work with them, she is very motivated but struggling with pain and weakness.   -Also has Tylenol, ketamine/gabapentin/lidocaine and diclofenac available topically, which she finds helpful, so will continue.    #Malnutrition, severity unspecified with hypoalbuminemia  - Calorie counts started 3/22  - Suspect lack of good PO intake is contributing to impaired wound healing   - PO intake improving, no plans for TPN at this time (do not think she would tolerate enterals d/t diarrhea)  >> Tried offering TPN patient declined    #Coagulopathy r/t severe malnutrition & infection (UTI)  -Suspect secondary to malnutrition. Dosing daily with vitamin K PRN.  -Got PO vitamin K 5 mg (3/19, 3/20, 3/21, 2x on 3/22, 3/23 and none since  -INR 1.53 today  >> wanted to start TPN but patient declined, working on PO intake    PAIN # Pain Assessment:  Current Pain Score 3/25/2018   Patient currently in pain? yes   Pain score (0-10) -   Pain location Rib cage   Pain descriptors Aching   - Elizabeth  is experiencing pain due to fractured rib, radiation treatment. Pain management was discussed and the plan was created in a collaborative fashion.  Elizabeth's response to the current recommendations: engaged  compliant  - Opioid regimen: Oxycodone 5mg every 4 hours PRN - on hold d/t AMS  - Response to opioid medications: Has not tried yet  - Bowel regimen: not needed  - Pharmacologic adjuvants: topical diclofenac gel, topical ketamine/gabapentin/lidocaine cream     FEN - Initially was on fluids but with S&S of volume overload 3/24 fluids stopped & IV lasix given (now 20 mg BID)  - incontinent of urine/stool (with diarrhea on imodium) s/p farias placement   PPX (VTE & GI) - lovenox SQ 40 mg daily (plt 114)   LINES & DRAINS - Port  - Farias  - Wound on sacrum   CONSULTS - OT, PT, WOC   CODE - FULL   DISPO - Anticipate d/c to TCU in the next 2-3 days pending control of pain, diarrhea, mental status, hypoxemia, and nutritional status.     Interval history  Afebrile, HD stable, on 2 L NC. Need weight recorded will call nursing. Unmeasured urine occurrence x 6 until farias was placed. Now 1.5 L out. Stool x 6 yesterday, x 3 ON. RN's assisting with perianal cares. Getting lomotil 4 x daily 1 tab scheduled. Only got imodium x1 yesterday. None the day prior. Did not work with OT yesterday, per RN notes ambulated with gait belt walker & SBA outside of room but needed to sit again r/t fatigue and weakness. Repeated a UA last night (known infection) >> with few bacteria, 1 WBC, hyaline casts, mucous. Cr is stable. Albumin still 1. HgB stable improved at 11 (concentrated ?). PLT stable 114. ALC 0.1.     >> Met with patient, she was concurrently working with PT. Says breathing feels better, on 2 L NC with activity. However now very dizzy/orthostatic with position changes. Will d/c scheduled lasix. She denies fever/chills. Farias catheter is helping with incontinence & irritation to wound. Has pain at wound site & on Rib. However feels  pain regimen is adequate, declines stronger medication (resuming oxycodone). Reports she hasn't seen WOC yet. Feeling motivated to eat, but intake hasn't yet improved. Declined TPN on discussion with Dr. Howe.     Physical Exam  Constitutional: Awake, alert, cooperative, in NAD. Elderly/frail.   Eyes: PERRL, EOMI, sclera clear, conjunctiva normal.  ENT: Normocephalic, without obvious abnormality, moist mucus membranes, no lesions or thrush.   Respiratory: Non-labored breathing, diminished in bases, no wheezing, no rales.   Cardiovascular: RRR, no murmur noted.  GI: +BS, soft, non-distended, non-tender.  Skin: No concerning lesions or rash on exposed areas.  Musculoskeletal: 1-2+ pitting edema to BL LE.   Neurologic: Awake, A&O x 3. Cranial nerves II-XII are grossly intact.   Neuropsychiatric: Calm, normal affect, not confused, memory intact, judgement/insight intact.     Rounding:  Temp:  [97.9  F (36.6  C)-99  F (37.2  C)] 98.8  F (37.1  C)  Heart Rate:  [] 87  Resp:  [18] 18  BP: (103-137)/(54-67) 112/54  SpO2:  [85 %-97 %] 95 %    I/O last 3 completed shifts:  In: 790 [P.O.:240; I.V.:550]  Out: 1400 [Urine:1400]    Vitals:    03/20/18 0744 03/21/18 2030 03/23/18 1652   Weight: 57.6 kg (126 lb 14.4 oz) 60.5 kg (133 lb 4.8 oz) 62.9 kg (138 lb 11.2 oz)       Recent Labs  Lab 03/25/18  0625 03/24/18  0603 03/23/18  0603 03/22/18  0643   WBC 4.4 2.2* 1.3* 1.0*   RBC 3.61* 3.19* 3.51* 3.39*   HGB 11.0* 9.7* 10.9* 10.3*   HCT 33.9* 29.9* 33.0* 31.2*   MCV 94 94 94 92   MCH 30.5 30.4 31.1 30.4   MCHC 32.4 32.4 33.0 33.0   RDW 15.2* 15.4* 15.4* 15.1*   * 103* 107* 110*       Recent Labs  Lab 03/25/18  0625 03/24/18  0603 03/23/18  0603 03/22/18  0643 03/21/18  1945 03/21/18  0609    136 136 137  --  136   POTASSIUM 3.5 4.2 4.5 4.6  --  3.8  3.8   CHLORIDE 101 105 105 106  --  105   CO2 25 24 24 24  --  22   ANIONGAP 8 7 7 6  --  8   GLC 89 86 97 97  --  123*   BUN 7 6* 6* 7  --  9   CR 0.68 0.70  0.56 0.54  --  0.54   GFRESTIMATED 83 81 >90 >90  --  >90   GFRESTBLACK >90 >90 >90 >90  --  >90   ELISE 7.9* 7.2* 7.4* 7.1*  --  7.2*   MAG  --  1.8 1.7 1.7  --  1.8   PHOS  --  2.8 2.0* 2.2* 1.8* 1.1*   PROTTOTAL 4.0* 3.6* 3.9* 3.6*  --  3.7*   ALBUMIN 1.1* 1.0* 1.2* 1.2*  --  1.3*   BILITOTAL 0.9 1.0 0.8 0.9  --  0.8   ALKPHOS 94 74 75 61  --  54   AST 14 13 14 18  --  19   ALT 18 18 20 20  --  21     Recent Results (from the past 24 hour(s))   XR Chest Port 1 View    Narrative    Exam:  XR CHEST PORT 1 VW, 3/24/2018 3:12 PM    History: New oxygen requirement;     Comparison:  3/19/2018    Findings:  AP views of the chest. Right chest wall Port-A-Cath tip  projects over the right atrium. The cardiomediastinal silhouette is  stable. Trachea is midline. Small bilateral pleural effusions.  Opacification of the left costophrenic angle. Bibasilar opacities.  Curvature of the spine to the right. Fracture deformity of the right  eighth rib. Degenerative changes of the shoulders. Upper abdomen is  unremarkable.      Impression    Impression:    Small bilateral layering pleural effusions with overlying  atelectasis/consolidation.    I have personally reviewed the examination and initial interpretation  and I agree with the findings.    SHARON PAREKH MD     Anti-infectives (Future)    Start     Dose/Rate Route Frequency Ordered Stop    03/21/18 0830  ciprofloxacin (CIPRO) tablet 500 mg      500 mg Oral EVERY 12 HOURS SCHEDULED 03/21/18 0821 03/30/18 6034          multivitamin, therapeutic  1 tablet Oral Daily     zinc sulfate  220 mg Oral Daily     furosemide  20 mg Intravenous BID     diphenoxylate-atropine  1 tablet Oral 4x Daily     ciprofloxacin  500 mg Oral Q12H OSCAR     sodium chloride (PF)  3 mL Intracatheter Q8H     calcium-vitamin D  1 tablet Oral Daily     hydrocortisone   Rectal BID     vitamin B complex with vitamin C  1 tablet Oral Daily     enoxaparin  40 mg Subcutaneous Q24H       dextrose 5% and 0.45%  NaCl + KCl 20 mEq/L 30 mL/hr at 03/24/18 2120     - MEDICATION INSTRUCTIONS -         Samantha Campos, [facility], [phone].  Hematology/Oncology March 25, 2018

## 2018-03-26 ENCOUNTER — APPOINTMENT (OUTPATIENT)
Dept: PHYSICAL THERAPY | Facility: CLINIC | Age: 78
DRG: 393 | End: 2018-03-26
Payer: MEDICARE

## 2018-03-26 ENCOUNTER — APPOINTMENT (OUTPATIENT)
Dept: OCCUPATIONAL THERAPY | Facility: CLINIC | Age: 78
DRG: 393 | End: 2018-03-26
Payer: MEDICARE

## 2018-03-26 LAB
ALBUMIN SERPL-MCNC: 1.1 G/DL (ref 3.4–5)
ALP SERPL-CCNC: 82 U/L (ref 40–150)
ALT SERPL W P-5'-P-CCNC: 15 U/L (ref 0–50)
ANION GAP SERPL CALCULATED.3IONS-SCNC: 8 MMOL/L (ref 3–14)
APTT PPP: 33 SEC (ref 22–37)
AST SERPL W P-5'-P-CCNC: 13 U/L (ref 0–45)
BASOPHILS # BLD AUTO: 0 10E9/L (ref 0–0.2)
BASOPHILS NFR BLD AUTO: 0 %
BILIRUB SERPL-MCNC: 0.7 MG/DL (ref 0.2–1.3)
BUN SERPL-MCNC: 7 MG/DL (ref 7–30)
CALCIUM SERPL-MCNC: 7.4 MG/DL (ref 8.5–10.1)
CHLORIDE SERPL-SCNC: 101 MMOL/L (ref 94–109)
CO2 SERPL-SCNC: 27 MMOL/L (ref 20–32)
CREAT SERPL-MCNC: 0.57 MG/DL (ref 0.52–1.04)
DIFFERENTIAL METHOD BLD: ABNORMAL
EOSINOPHIL # BLD AUTO: 0 10E9/L (ref 0–0.7)
EOSINOPHIL NFR BLD AUTO: 0.5 %
ERYTHROCYTE [DISTWIDTH] IN BLOOD BY AUTOMATED COUNT: 15 % (ref 10–15)
GFR SERPL CREATININE-BSD FRML MDRD: >90 ML/MIN/1.7M2
GLUCOSE SERPL-MCNC: 78 MG/DL (ref 70–99)
HCT VFR BLD AUTO: 32.7 % (ref 35–47)
HGB BLD-MCNC: 10.6 G/DL (ref 11.7–15.7)
IMM GRANULOCYTES # BLD: 0 10E9/L (ref 0–0.4)
IMM GRANULOCYTES NFR BLD: 0.7 %
INR PPP: 1.72 (ref 0.86–1.14)
LYMPHOCYTES # BLD AUTO: 0.1 10E9/L (ref 0.8–5.3)
LYMPHOCYTES NFR BLD AUTO: 2.9 %
MAGNESIUM SERPL-MCNC: 2 MG/DL (ref 1.6–2.3)
MCH RBC QN AUTO: 30.4 PG (ref 26.5–33)
MCHC RBC AUTO-ENTMCNC: 32.4 G/DL (ref 31.5–36.5)
MCV RBC AUTO: 94 FL (ref 78–100)
MONOCYTES # BLD AUTO: 0.5 10E9/L (ref 0–1.3)
MONOCYTES NFR BLD AUTO: 10.2 %
NEUTROPHILS # BLD AUTO: 3.8 10E9/L (ref 1.6–8.3)
NEUTROPHILS NFR BLD AUTO: 85.7 %
NRBC # BLD AUTO: 0 10*3/UL
NRBC BLD AUTO-RTO: 0 /100
PLATELET # BLD AUTO: 135 10E9/L (ref 150–450)
PLATELET # BLD EST: ABNORMAL 10*3/UL
POTASSIUM SERPL-SCNC: 3.8 MMOL/L (ref 3.4–5.3)
PROT SERPL-MCNC: 3.9 G/DL (ref 6.8–8.8)
RBC # BLD AUTO: 3.49 10E12/L (ref 3.8–5.2)
SODIUM SERPL-SCNC: 136 MMOL/L (ref 133–144)
WBC # BLD AUTO: 4.4 10E9/L (ref 4–11)

## 2018-03-26 PROCEDURE — 25000128 H RX IP 250 OP 636: Performed by: PHYSICIAN ASSISTANT

## 2018-03-26 PROCEDURE — 25000132 ZZH RX MED GY IP 250 OP 250 PS 637: Mod: GY | Performed by: PHYSICIAN ASSISTANT

## 2018-03-26 PROCEDURE — 25800025 ZZH RX 258: Performed by: PHYSICIAN ASSISTANT

## 2018-03-26 PROCEDURE — 25000125 ZZHC RX 250: Performed by: PHYSICIAN ASSISTANT

## 2018-03-26 PROCEDURE — 40000133 ZZH STATISTIC OT WARD VISIT

## 2018-03-26 PROCEDURE — A9270 NON-COVERED ITEM OR SERVICE: HCPCS | Mod: GY | Performed by: PHYSICIAN ASSISTANT

## 2018-03-26 PROCEDURE — 25000128 H RX IP 250 OP 636: Performed by: INTERNAL MEDICINE

## 2018-03-26 PROCEDURE — 25000132 ZZH RX MED GY IP 250 OP 250 PS 637: Mod: GY | Performed by: NURSE PRACTITIONER

## 2018-03-26 PROCEDURE — 85610 PROTHROMBIN TIME: CPT | Performed by: PHYSICIAN ASSISTANT

## 2018-03-26 PROCEDURE — 25000132 ZZH RX MED GY IP 250 OP 250 PS 637: Mod: GY | Performed by: INTERNAL MEDICINE

## 2018-03-26 PROCEDURE — 83735 ASSAY OF MAGNESIUM: CPT | Performed by: PHYSICIAN ASSISTANT

## 2018-03-26 PROCEDURE — G0463 HOSPITAL OUTPT CLINIC VISIT: HCPCS

## 2018-03-26 PROCEDURE — 97530 THERAPEUTIC ACTIVITIES: CPT | Mod: GP | Performed by: REHABILITATION PRACTITIONER

## 2018-03-26 PROCEDURE — 99232 SBSQ HOSP IP/OBS MODERATE 35: CPT | Performed by: INTERNAL MEDICINE

## 2018-03-26 PROCEDURE — A9270 NON-COVERED ITEM OR SERVICE: HCPCS | Mod: GY | Performed by: INTERNAL MEDICINE

## 2018-03-26 PROCEDURE — 85730 THROMBOPLASTIN TIME PARTIAL: CPT | Performed by: PHYSICIAN ASSISTANT

## 2018-03-26 PROCEDURE — 80053 COMPREHEN METABOLIC PANEL: CPT | Performed by: PHYSICIAN ASSISTANT

## 2018-03-26 PROCEDURE — 97116 GAIT TRAINING THERAPY: CPT | Mod: GP | Performed by: REHABILITATION PRACTITIONER

## 2018-03-26 PROCEDURE — 97535 SELF CARE MNGMENT TRAINING: CPT | Mod: GO

## 2018-03-26 PROCEDURE — A9270 NON-COVERED ITEM OR SERVICE: HCPCS | Mod: GY | Performed by: NURSE PRACTITIONER

## 2018-03-26 PROCEDURE — 12000008 ZZH R&B INTERMEDIATE UMMC

## 2018-03-26 PROCEDURE — 25000131 ZZH RX MED GY IP 250 OP 636 PS 637: Mod: GY | Performed by: NURSE PRACTITIONER

## 2018-03-26 PROCEDURE — 36592 COLLECT BLOOD FROM PICC: CPT | Performed by: PHYSICIAN ASSISTANT

## 2018-03-26 PROCEDURE — 40000193 ZZH STATISTIC PT WARD VISIT: Performed by: REHABILITATION PRACTITIONER

## 2018-03-26 PROCEDURE — 85025 COMPLETE CBC W/AUTO DIFF WBC: CPT | Performed by: PHYSICIAN ASSISTANT

## 2018-03-26 RX ORDER — TRAMADOL HYDROCHLORIDE 50 MG/1
50 TABLET ORAL EVERY 6 HOURS PRN
Status: DISCONTINUED | OUTPATIENT
Start: 2018-03-26 | End: 2018-04-04

## 2018-03-26 RX ORDER — OCTREOTIDE ACETATE 100 UG/ML
100 INJECTION, SOLUTION INTRAVENOUS; SUBCUTANEOUS 3 TIMES DAILY
Status: DISCONTINUED | OUTPATIENT
Start: 2018-03-26 | End: 2018-03-27

## 2018-03-26 RX ADMIN — HYDROCORTISONE: 25 CREAM TOPICAL at 08:21

## 2018-03-26 RX ADMIN — CIPROFLOXACIN HYDROCHLORIDE 500 MG: 500 TABLET, FILM COATED ORAL at 08:42

## 2018-03-26 RX ADMIN — TRAMADOL HYDROCHLORIDE 50 MG: 50 TABLET, COATED ORAL at 09:38

## 2018-03-26 RX ADMIN — DIPHENOXYLATE HYDROCHLORIDE AND ATROPINE SULFATE 1 TABLET: 2.5; .025 TABLET ORAL at 21:21

## 2018-03-26 RX ADMIN — CIPROFLOXACIN HYDROCHLORIDE 500 MG: 500 TABLET, FILM COATED ORAL at 21:21

## 2018-03-26 RX ADMIN — OCTREOTIDE ACETATE 100 MCG: 100 INJECTION, SOLUTION INTRAVENOUS; SUBCUTANEOUS at 14:35

## 2018-03-26 RX ADMIN — ENOXAPARIN SODIUM 40 MG: 40 INJECTION SUBCUTANEOUS at 21:21

## 2018-03-26 RX ADMIN — OCTREOTIDE ACETATE 100 MCG: 100 INJECTION, SOLUTION INTRAVENOUS; SUBCUTANEOUS at 21:22

## 2018-03-26 RX ADMIN — Medication 1 TABLET: at 12:54

## 2018-03-26 RX ADMIN — DIPHENOXYLATE HYDROCHLORIDE AND ATROPINE SULFATE 1 TABLET: 2.5; .025 TABLET ORAL at 16:37

## 2018-03-26 RX ADMIN — ACETAMINOPHEN 650 MG: 325 TABLET, FILM COATED ORAL at 09:38

## 2018-03-26 RX ADMIN — DIPHENOXYLATE HYDROCHLORIDE AND ATROPINE SULFATE 1 TABLET: 2.5; .025 TABLET ORAL at 08:21

## 2018-03-26 RX ADMIN — LOPERAMIDE HYDROCHLORIDE 2 MG: 2 CAPSULE ORAL at 08:21

## 2018-03-26 RX ADMIN — DIPHENHYDRAMINE HYDROCHLORIDE AND LIDOCAINE HYDROCHLORIDE AND ALUMINUM HYDROXIDE AND MAGNESIUM HYDRO 10 ML: KIT at 11:47

## 2018-03-26 RX ADMIN — POTASSIUM CHLORIDE 20 MEQ: 29.8 INJECTION, SOLUTION INTRAVENOUS at 11:24

## 2018-03-26 RX ADMIN — DIPHENOXYLATE HYDROCHLORIDE AND ATROPINE SULFATE 1 TABLET: 2.5; .025 TABLET ORAL at 12:54

## 2018-03-26 RX ADMIN — Medication 2 G: at 08:25

## 2018-03-26 RX ADMIN — LOPERAMIDE HYDROCHLORIDE 2 MG: 2 CAPSULE ORAL at 12:54

## 2018-03-26 RX ADMIN — POTASSIUM CHLORIDE, DEXTROSE MONOHYDRATE AND SODIUM CHLORIDE: 150; 5; 450 INJECTION, SOLUTION INTRAVENOUS at 08:19

## 2018-03-26 ASSESSMENT — PAIN DESCRIPTION - DESCRIPTORS
DESCRIPTORS: BURNING;ACHING

## 2018-03-26 NOTE — PLAN OF CARE
Problem: Patient Care Overview  Goal: Plan of Care/Patient Progress Review  OT 7D  Discharge Planner OT   Patient plan for discharge: TCU  Current status: Pt requires increased time for all mobility and transfers; verbalizes each step of activity prior to completing. Pt demonstrating improvement in safe hand placement with functional transfers requiring only 2 VCs for a total of 6 STS transfers. CGA for all transfers. Pt supine > sidelying CGA; mod A with VCs for sidelying > sitting with significant pain. CGA with FWW for pivot transfers x 3. SBA given setup for oral hygiene, changing gown, and donning deodorant. Requiring VCs x 2 for re-direction to task as patient distracted and moving onto next task prior to finishing one.   Barriers to return to prior living situation: functional strength and endurance, pain, medical status  Recommendations for discharge: TCU  Rationale for recommendations: Pt will benefit from further OT/PT to progress functional safety and independence in I/ADLs       Entered by: Brenda Alex 03/26/2018 2:28 PM

## 2018-03-26 NOTE — PLAN OF CARE
Problem: Diarrhea (Adult)  Goal: Identify Related Risk Factors and Signs and Symptoms  Related risk factors and signs and symptoms are identified upon initiation of Human Response Clinical Practice Guideline (CPG).   Outcome: No Change  Continues with incontinence and diarrhea. Wound care evaluated current wound care plan and made some changes.Up in chair. Appetite poor. Tramadol and Tylenol for perineum pain with some relief. Octreotide started for diarrhea in addition to other antidiarrheal medications. Has a farias catheter due to perineal swelling. Magnesium and potassium replaced and rechecks ordered.

## 2018-03-26 NOTE — PROGRESS NOTES
"Social Work Services Progress Note    Hospital Day: 7  Date of Initial Social Work Evaluation:  To be completed.  Collaborated with:  Patient, Rosa Gonzalez [facility], TCU admissions staff    Data:  SW consulted to meet w/ patient for discharge planning. Per PT/OT recommend TCU at discharge.    Intervention:  Per Rosa Carlos [facility], anticipate patient will be medically stable for discharge to TCU Wednesday 03/28 vs. Thurs 03/29.     SW received phone call from Lucia in admissions at [facility] (p: [phone]) wanting to know the following: (1) disposition plans for after completing rehab stay; (2) if patient has secondary insurance, otherwise patient will be private pay following Medicare coverage ends; (3) chemo/radiation plan.    SW met w/ patient to continue discussions re: discharge planning. Per patient, only has Medicare insurance, no supplementary insurance. SW explained that patient would initially be covered for TCU stay days 1-20 by Medicare, and then would become private pay for stay at facility starting on day 21. Patient expressed understanding of this and agreeable to private pay, patient does state it would be her \"goal to get back home before then but I understand if I have to stay longer and privately pay.\" Patient reports she would be able to stay w/ her sister Christopher following discharge from TCU facility if she is unable to live independently at home by that time. Patient very happy that [facility] and [facility] remain possibilities for discharge plan, requests SW to print off information on these facilities for her to have.     THOMAS confirmed w/ Rosa that patient has completed chemo and radiation and will not require either during TCU stay.    THOMAS left VM for Lucia at [facility] w/ above information she had requested.    REFERRALS  [facility] - left VM w/ follow-up questions from admissions staff, anticipate that they could accept patient middle to end of this " [facility] for admissions staff, awaiting return call  [facility] - Declined    Assessment:  Patient agreeable w/ plan to discharge to TCU facility when medically stable. Patient appears to have strong support system of her friends and her sister.    Plan:    Anticipated Disposition:  Facility:  TCU, pending placement    Barriers to d/c plan:  Medical stability    Follow Up:   to continue following for discharge planning.    JEFF Bull, LGSW  7D Oncology   Phone [phone]  Pager [phone]

## 2018-03-26 NOTE — PLAN OF CARE
Problem: Patient Care Overview  Goal: Plan of Care/Patient Progress Review  PT - per plan established by the Physical Therapist, according to functional mobility the  discharge recommendation is TCU to progress IND for all functional mobility. Pt needing extra time for all mob, pt needing min to mod A for log roll tech from R side lying. Pt CGA for all STS to WW for standing stability. Pt amb up to 200'x 1 with WW. Pt needing extra time due to pain for all bed mob, and STS.   Discharge Planner PT   Patient plan for discharge: rehab.   Current status: see above.  Barriers to return to prior living situation: pain   Recommendations for discharge: TCU   Rationale for recommendations: skilled PT to progress functional mob.        Entered by: Larry Brewer 03/26/2018 4:08 PM

## 2018-03-26 NOTE — PLAN OF CARE
Problem: Patient Care Overview  Goal: Plan of Care/Patient Progress Review  Outcome: Improving  VSS. Pt reports pain is well controlled at this time. Coccyx reddened with blisters, Aquaphor cream applied after each stool. Joiner patent with dark nelson output. Pt attempting to push fluids and foods. Worked with PT this afternoon, and independently working on exercises throughout the evening. Pt reports she would like to go for a walk this evening. 2x loose BM, incontinent x 1.  Potassium replaced for a level at 3.5, will need magnesium replacement as well.

## 2018-03-26 NOTE — PLAN OF CARE
Problem: Patient Care Overview  Goal: Plan of Care/Patient Progress Review    0010-0100:  VSS, afebrile. C/o pain generalized in perineum area & R. Rib cage, heating pad used & creams applied; pt reports pain being controlled. Pt w/ diarrheal incontinence x4 overnight PRN imodium given along w/ scheduled antidiarrheals. Joiner patent, but w/ low documented UOP overnight. Pt would prefer to take medications w/ applesauce. Low PO intake. Pt expressed interest w/ wanting to ambulate, but declined when offered overnight; doing independent exercises in bed. Magnesium replaced, recheck this am. Continue to monitor & w/ POC.
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
## 2018-03-26 NOTE — PROGRESS NOTES
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     Hospital complications:    #UTI (Klebsiella pneumoniae & proteus mirabilis)  -Continue Cipro (x3/21) 500mg BID, plan for 10 days of therapy (x3/21-3/30).  -Repeat UA continues to be dirty with hyaline casts, didn't reflex to culture.    #Stage IIIA (T2 N1) SCC of anus  -Followed by Dr. Ray  -S/p 1 cycle 5-FU + mitomycin (D1 2/12) followed by 1 cycle single-agent 5-FU (d/t toxicity and age, D29 3/11) with concurrent radiation  -Completed radiation 3/23  -Supportive rectal cares ordered  -Intermittently expressing feelings of hopelessness and feeling at peace with dying, not wanting to continue with treatment. Consulted palliative care SW for assistance with illness coping and decision making, appreciate their assistance. Feeling more hopefulnow that she has completed XRT, will continue supportive cares, she has f/u appt with Dr. Ray in ~2 weeks.    #Hypoxemia >> improving, now on 2 L NC likely 2/2 to volume overload/pulmonary edema & b/l Pleural effusions  Developed overnight 3/23-3/24. Hypoalbuminemic with signs of third spacing. Suspect secondary to fluid overload. S/P farias placement. CXR 3/24 10 am with small bilateral layering pleural effusions with overlying atelectasis/consolidation.   - lasix 20 mg BID on 3/24-25, now d/c as dizzy with ambulation & respiratory status improved  - IVF now 0-30 ml/hr PRN as needed to run with concurrent IV medications    Malignancy/Treatment related (chemotherapy/XRT) complications:    #Intractable diarrhea  #Nausea  #Dehydration  #Electrolyte derangements  -Suspect nausea, diarrhea secondary to mucositis from recent chemotherapy (5-FU) and XRT. C diff & enteric  panel were negative.   -Continue scheduled Lomotil. Imodium available PRN.   - Add octreotide 100mg TID x 1 day to trial.    - Likely her anal sphincter tone is compromised, and incontinence will continue to be an issue.    -Added tincture of opium, which seemed to be helping until overnight 3/23-3/24. Holding tincture of opium at this time due to confusion (see below).  -Antiemetics also available (Zofran, Compazine). No vomiting since admission.  -High lyte SS.    #Perianal wound, secondary to radiation  - Pain with tylenol and added tramadol PRN   - Will rediscuss TPN given concern for compromised wound healing after XRT with malnutrition    #Pancytopenia  -Secondary to underlying disease, recent chemo/XRT  -Transfuse for Hgb <8, PLT <10K - no transfusion needs thus far    #Weakness  #Fall at home  #Fractured R 8th rib  -Suspect weakness, falls secondary to above issues.  -Fall precautions  -PT/OT --> encouraged patient to work with them, she is very motivated but struggling with pain and weakness.   -Also has Tylenol, ketamine/gabapentin/lidocaine and diclofenac available topically, which she finds helpful, so will continue.    #Malnutrition, severity unspecified with hypoalbuminemia  - Calorie counts started 3/22  - Suspect lack of good PO intake is contributing to impaired wound healing   - PO intake improving, no plans for TPN at this time (do not think she would tolerate enterals d/t diarrhea)  >> Tried offering TPN patient declined    #Coagulopathy r/t severe malnutrition & infection (UTI)  -Suspect secondary to malnutrition. Dosing daily with vitamin K PRN.  -Got PO vitamin K 5 mg (3/19, 3/20, 3/21, 2x on 3/22, 3/23 and none since  -INR 1.53 today  >> wanted to start TPN but patient declined, working on PO intake    #Altered mental status >> improved/resolved likely r/t MEDS 3/25 AM  -Nonsensical statements this morning, disoriented. 3/24 AM.  -Was getting concurrent oxycodone/opium tincture. Will now  hold.  -Hypoxia 2/2 to volume overload.  -Blood culture: NTD  -Urine culture: 3/19 with klebsiella PNA on ciprofloxacin  -CXR with pleural effusions.     PAIN # Pain Assessment:  Current Pain Score 3/26/2018   Patient currently in pain? yes   Pain score (0-10) -   Pain location Perineum   Pain descriptors Burning;Aching   - Elizabeth is experiencing pain due to fractured rib, radiation treatment. Pain management was discussed and the plan was created in a collaborative fashion.  Elizabeth's response to the current recommendations: engaged  compliant  - Opioid regimen: tramadol and tylenol PRN (oxycodone and opiod tincture caused ANS)   - Pharmacologic adjuvants: topical diclofenac gel, topical ketamine/gabapentin/lidocaine cream   FEN - Fluid bolus PRN  - incontinent of urine/stool (with diarrhea on imodium) s/p farias placement   PPX (VTE & GI) - lovenox SQ 40 mg daily (plt 114)  - Bowel regimen: not needed- having diarrhea   LINES & DRAINS & WOUNDS - Port  - Farias  - Wound on sacrum   CONSULTS - OT, PT, WOC   CODE - FULL   DISPO - Anticipate d/c to TCU in the next 2-3 days pending control of pain, diarrhea, mental status, hypoxemia, and nutritional status.     Interval history  Met with patient, she was having perianal care given persistent diarrhea. She reports she is trying to eat, but record shows limited PO intake. She declined TPN yesterday. Attempted to rediscuss this afternoon but she was sleeping.  Farias catheter is helping with incontinence & irritation to wound. Has pain at wound site & on Rib. She is in more pain this AM, and perianal care is understandably very painful.      Above plan discussed with staff physician, Dr. Howe.     Rosa Gonzalez, [facility]-BC  Hematology/Oncology  #[id number]        Physical Exam  Constitutional: Awake, alert, cooperative, in NAD. Elderly/frail.   Eyes: PERRL, EOMI, sclera clear, conjunctiva normal.  ENT: Normocephalic, without obvious abnormality, moist mucus membranes, no lesions or  thrush.   Respiratory: Non-labored breathing, diminished in bases, no wheezing, no rales.   Cardiovascular: RRR, no murmur noted.  GI: +BS, soft, non-distended, non-tender.  Skin: No concerning lesions or rash on exposed areas.  Musculoskeletal: 1-2+ pitting edema to BL LE.   Neurologic: Awake, A&O x 3. Cranial nerves II-XII are grossly intact.   Neuropsychiatric: Calm, normal affect, not confused, memory intact, judgement/insight intact.     Rounding:  Temp:  [97.1  F (36.2  C)-99.3  F (37.4  C)] 99.3  F (37.4  C)  Heart Rate:  [] 89  Resp:  [16-18] 18  BP: (104-110)/(53-63) 107/57  SpO2:  [89 %-95 %] 95 %    I/O last 3 completed shifts:  In: 1305 [P.O.:720; I.V.:585]  Out: 500 [Urine:500]    Vitals:    03/20/18 0744 03/21/18 2030 03/23/18 1652   Weight: 57.6 kg (126 lb 14.4 oz) 60.5 kg (133 lb 4.8 oz) 62.9 kg (138 lb 11.2 oz)       Recent Labs  Lab 03/26/18 0615 03/25/18  0625 03/24/18  0603 03/23/18  0603   WBC 4.4 4.4 2.2* 1.3*   RBC 3.49* 3.61* 3.19* 3.51*   HGB 10.6* 11.0* 9.7* 10.9*   HCT 32.7* 33.9* 29.9* 33.0*   MCV 94 94 94 94   MCH 30.4 30.5 30.4 31.1   MCHC 32.4 32.4 32.4 33.0   RDW 15.0 15.2* 15.4* 15.4*   * 114* 103* 107*       Recent Labs  Lab 03/26/18 0615 03/25/18  0625 03/24/18  0603 03/23/18  0603 03/22/18  0643 03/21/18  1945    134 136 136 137  --    POTASSIUM 3.8 3.5 4.2 4.5 4.6  --    CHLORIDE 101 101 105 105 106  --    CO2 27 25 24 24 24  --    ANIONGAP 8 8 7 7 6  --    GLC 78 89 86 97 97  --    BUN 7 7 6* 6* 7  --    CR 0.57 0.68 0.70 0.56 0.54  --    GFRESTIMATED >90 83 81 >90 >90  --    GFRESTBLACK >90 >90 >90 >90 >90  --    ELISE 7.4* 7.9* 7.2* 7.4* 7.1*  --    MAG 2.0 1.8 1.8 1.7 1.7  --    PHOS  --   --  2.8 2.0* 2.2* 1.8*   PROTTOTAL 3.9* 4.0* 3.6* 3.9* 3.6*  --    ALBUMIN 1.1* 1.1* 1.0* 1.2* 1.2*  --    BILITOTAL 0.7 0.9 1.0 0.8 0.9  --    ALKPHOS 82 94 74 75 61  --    AST 13 14 13 14 18  --    ALT 15 18 18 20 20  --      No results found for this or any previous  visit (from the past 24 hour(s)).  Anti-infectives (Future)    Start     Dose/Rate Route Frequency Ordered Stop    03/21/18 0830  ciprofloxacin (CIPRO) tablet 500 mg      500 mg Oral EVERY 12 HOURS SCHEDULED 03/21/18 0821 03/30/18 7106          calcium-vitamin D  1 tablet Oral Daily     octreotide  100 mcg Intravenous TID     multivitamin, therapeutic  1 tablet Oral Daily     zinc sulfate  220 mg Oral Daily     diphenoxylate-atropine  1 tablet Oral 4x Daily     ciprofloxacin  500 mg Oral Q12H OSCAR     sodium chloride (PF)  3 mL Intracatheter Q8H     hydrocortisone   Rectal BID     vitamin B complex with vitamin C  1 tablet Oral Daily     enoxaparin  40 mg Subcutaneous Q24H       dextrose 5% and 0.45% NaCl + KCl 20 mEq/L 30 mL/hr at 03/26/18 0819     - MEDICATION INSTRUCTIONS -

## 2018-03-27 ENCOUNTER — APPOINTMENT (OUTPATIENT)
Dept: GENERAL RADIOLOGY | Facility: CLINIC | Age: 78
DRG: 393 | End: 2018-03-27
Attending: INTERNAL MEDICINE
Payer: MEDICARE

## 2018-03-27 ENCOUNTER — APPOINTMENT (OUTPATIENT)
Dept: GENERAL RADIOLOGY | Facility: CLINIC | Age: 78
DRG: 393 | End: 2018-03-27
Attending: NURSE PRACTITIONER
Payer: MEDICARE

## 2018-03-27 ENCOUNTER — APPOINTMENT (OUTPATIENT)
Dept: OCCUPATIONAL THERAPY | Facility: CLINIC | Age: 78
DRG: 393 | End: 2018-03-27
Payer: MEDICARE

## 2018-03-27 ENCOUNTER — APPOINTMENT (OUTPATIENT)
Dept: PHYSICAL THERAPY | Facility: CLINIC | Age: 78
DRG: 393 | End: 2018-03-27
Payer: MEDICARE

## 2018-03-27 LAB
ALBUMIN SERPL-MCNC: 1.1 G/DL (ref 3.4–5)
ALP SERPL-CCNC: 83 U/L (ref 40–150)
ALT SERPL W P-5'-P-CCNC: 14 U/L (ref 0–50)
ANION GAP SERPL CALCULATED.3IONS-SCNC: 4 MMOL/L (ref 3–14)
APTT PPP: 38 SEC (ref 22–37)
AST SERPL W P-5'-P-CCNC: 12 U/L (ref 0–45)
BASOPHILS # BLD AUTO: 0 10E9/L (ref 0–0.2)
BASOPHILS NFR BLD AUTO: 0 %
BILIRUB SERPL-MCNC: 0.6 MG/DL (ref 0.2–1.3)
BUN SERPL-MCNC: 8 MG/DL (ref 7–30)
CALCIUM SERPL-MCNC: 7.6 MG/DL (ref 8.5–10.1)
CHLORIDE SERPL-SCNC: 101 MMOL/L (ref 94–109)
CO2 SERPL-SCNC: 30 MMOL/L (ref 20–32)
CREAT SERPL-MCNC: 0.58 MG/DL (ref 0.52–1.04)
DIFFERENTIAL METHOD BLD: ABNORMAL
EOSINOPHIL # BLD AUTO: 0 10E9/L (ref 0–0.7)
EOSINOPHIL NFR BLD AUTO: 0 %
ERYTHROCYTE [DISTWIDTH] IN BLOOD BY AUTOMATED COUNT: 14.9 % (ref 10–15)
GFR SERPL CREATININE-BSD FRML MDRD: >90 ML/MIN/1.7M2
GLUCOSE SERPL-MCNC: 88 MG/DL (ref 70–99)
HCT VFR BLD AUTO: 33.5 % (ref 35–47)
HGB BLD-MCNC: 10.8 G/DL (ref 11.7–15.7)
INR PPP: 1.87 (ref 0.86–1.14)
LYMPHOCYTES # BLD AUTO: 0 10E9/L (ref 0.8–5.3)
LYMPHOCYTES NFR BLD AUTO: 0.9 %
MAGNESIUM SERPL-MCNC: 2 MG/DL (ref 1.6–2.3)
MCH RBC QN AUTO: 30.3 PG (ref 26.5–33)
MCHC RBC AUTO-ENTMCNC: 32.2 G/DL (ref 31.5–36.5)
MCV RBC AUTO: 94 FL (ref 78–100)
METAMYELOCYTES # BLD: 0.2 10E9/L
METAMYELOCYTES NFR BLD MANUAL: 3.5 %
MONOCYTES # BLD AUTO: 0.2 10E9/L (ref 0–1.3)
MONOCYTES NFR BLD AUTO: 4.3 %
NEUTROPHILS # BLD AUTO: 3.8 10E9/L (ref 1.6–8.3)
NEUTROPHILS NFR BLD AUTO: 88.7 %
NRBC # BLD AUTO: 0 10*3/UL
NRBC BLD AUTO-RTO: 1 /100
PHOSPHATE SERPL-MCNC: 3.7 MG/DL (ref 2.5–4.5)
PLATELET # BLD AUTO: 165 10E9/L (ref 150–450)
PLATELET # BLD EST: ABNORMAL 10*3/UL
POTASSIUM SERPL-SCNC: 4.4 MMOL/L (ref 3.4–5.3)
PROMYELOCYTES # BLD MANUAL: 0.1 10E9/L
PROMYELOCYTES NFR BLD MANUAL: 2.6 %
PROT SERPL-MCNC: 4.1 G/DL (ref 6.8–8.8)
RADIOLOGIST FLAGS: ABNORMAL
RBC # BLD AUTO: 3.57 10E12/L (ref 3.8–5.2)
RBC MORPH BLD: NORMAL
SODIUM SERPL-SCNC: 135 MMOL/L (ref 133–144)
WBC # BLD AUTO: 4.3 10E9/L (ref 4–11)

## 2018-03-27 PROCEDURE — 27210437 ZZH NUTRITION PRODUCT SEMIELEM INTERMED LITER

## 2018-03-27 PROCEDURE — 25000132 ZZH RX MED GY IP 250 OP 250 PS 637: Mod: GY | Performed by: NURSE PRACTITIONER

## 2018-03-27 PROCEDURE — 97140 MANUAL THERAPY 1/> REGIONS: CPT | Mod: GO

## 2018-03-27 PROCEDURE — 40000193 ZZH STATISTIC PT WARD VISIT

## 2018-03-27 PROCEDURE — 85610 PROTHROMBIN TIME: CPT | Performed by: PHYSICIAN ASSISTANT

## 2018-03-27 PROCEDURE — 25000125 ZZHC RX 250: Performed by: PHYSICIAN ASSISTANT

## 2018-03-27 PROCEDURE — 97530 THERAPEUTIC ACTIVITIES: CPT | Mod: GP

## 2018-03-27 PROCEDURE — 12000008 ZZH R&B INTERMEDIATE UMMC

## 2018-03-27 PROCEDURE — 25000132 ZZH RX MED GY IP 250 OP 250 PS 637: Mod: GY | Performed by: INTERNAL MEDICINE

## 2018-03-27 PROCEDURE — 40000986 XR ABDOMEN PORT 1 VW

## 2018-03-27 PROCEDURE — A9270 NON-COVERED ITEM OR SERVICE: HCPCS | Mod: GY | Performed by: NURSE PRACTITIONER

## 2018-03-27 PROCEDURE — 80053 COMPREHEN METABOLIC PANEL: CPT | Performed by: PHYSICIAN ASSISTANT

## 2018-03-27 PROCEDURE — 83735 ASSAY OF MAGNESIUM: CPT | Performed by: PHYSICIAN ASSISTANT

## 2018-03-27 PROCEDURE — A9270 NON-COVERED ITEM OR SERVICE: HCPCS | Mod: GY | Performed by: INTERNAL MEDICINE

## 2018-03-27 PROCEDURE — 36592 COLLECT BLOOD FROM PICC: CPT | Performed by: PHYSICIAN ASSISTANT

## 2018-03-27 PROCEDURE — 85730 THROMBOPLASTIN TIME PARTIAL: CPT | Performed by: PHYSICIAN ASSISTANT

## 2018-03-27 PROCEDURE — 25000131 ZZH RX MED GY IP 250 OP 636 PS 637: Mod: GY | Performed by: INTERNAL MEDICINE

## 2018-03-27 PROCEDURE — 74018 RADEX ABDOMEN 1 VIEW: CPT

## 2018-03-27 PROCEDURE — 84100 ASSAY OF PHOSPHORUS: CPT | Performed by: PHYSICIAN ASSISTANT

## 2018-03-27 PROCEDURE — 25000128 H RX IP 250 OP 636: Performed by: NURSE PRACTITIONER

## 2018-03-27 PROCEDURE — 84100 ASSAY OF PHOSPHORUS: CPT | Performed by: NURSE PRACTITIONER

## 2018-03-27 PROCEDURE — 40000133 ZZH STATISTIC OT WARD VISIT

## 2018-03-27 PROCEDURE — 97535 SELF CARE MNGMENT TRAINING: CPT | Mod: GO

## 2018-03-27 PROCEDURE — 99232 SBSQ HOSP IP/OBS MODERATE 35: CPT | Performed by: INTERNAL MEDICINE

## 2018-03-27 PROCEDURE — 25000128 H RX IP 250 OP 636: Performed by: INTERNAL MEDICINE

## 2018-03-27 PROCEDURE — 85025 COMPLETE CBC W/AUTO DIFF WBC: CPT | Performed by: PHYSICIAN ASSISTANT

## 2018-03-27 PROCEDURE — 97110 THERAPEUTIC EXERCISES: CPT | Mod: GP

## 2018-03-27 PROCEDURE — A9270 NON-COVERED ITEM OR SERVICE: HCPCS | Mod: GY | Performed by: PHYSICIAN ASSISTANT

## 2018-03-27 PROCEDURE — 25000132 ZZH RX MED GY IP 250 OP 250 PS 637: Mod: GY | Performed by: PHYSICIAN ASSISTANT

## 2018-03-27 PROCEDURE — 25000125 ZZHC RX 250: Performed by: NURSE PRACTITIONER

## 2018-03-27 RX ORDER — OCTREOTIDE ACETATE 100 UG/ML
100 INJECTION, SOLUTION INTRAVENOUS; SUBCUTANEOUS 3 TIMES DAILY
Status: DISCONTINUED | OUTPATIENT
Start: 2018-03-27 | End: 2018-03-27

## 2018-03-27 RX ORDER — HEPARIN SODIUM (PORCINE) LOCK FLUSH IV SOLN 100 UNIT/ML 100 UNIT/ML
5 SOLUTION INTRAVENOUS
Status: DISCONTINUED | OUTPATIENT
Start: 2018-03-27 | End: 2018-04-04 | Stop reason: HOSPADM

## 2018-03-27 RX ORDER — HEPARIN SODIUM,PORCINE 10 UNIT/ML
5-10 VIAL (ML) INTRAVENOUS
Status: DISCONTINUED | OUTPATIENT
Start: 2018-03-27 | End: 2018-04-04 | Stop reason: HOSPADM

## 2018-03-27 RX ORDER — HEPARIN SODIUM,PORCINE 10 UNIT/ML
5-10 VIAL (ML) INTRAVENOUS EVERY 24 HOURS
Status: DISCONTINUED | OUTPATIENT
Start: 2018-03-27 | End: 2018-04-04 | Stop reason: HOSPADM

## 2018-03-27 RX ORDER — OCTREOTIDE ACETATE 100 UG/ML
100 INJECTION, SOLUTION INTRAVENOUS; SUBCUTANEOUS 3 TIMES DAILY
Status: DISCONTINUED | OUTPATIENT
Start: 2018-03-27 | End: 2018-03-28

## 2018-03-27 RX ORDER — PHYTONADIONE 5 MG/1
5 TABLET ORAL ONCE
Status: COMPLETED | OUTPATIENT
Start: 2018-03-27 | End: 2018-03-27

## 2018-03-27 RX ADMIN — DIPHENOXYLATE HYDROCHLORIDE AND ATROPINE SULFATE 1 TABLET: 2.5; .025 TABLET ORAL at 17:58

## 2018-03-27 RX ADMIN — DIPHENHYDRAMINE HYDROCHLORIDE AND LIDOCAINE HYDROCHLORIDE AND ALUMINUM HYDROXIDE AND MAGNESIUM HYDRO 10 ML: KIT at 09:23

## 2018-03-27 RX ADMIN — CIPROFLOXACIN HYDROCHLORIDE 500 MG: 500 TABLET, FILM COATED ORAL at 09:12

## 2018-03-27 RX ADMIN — PHYTONADIONE 5 MG: 5 TABLET ORAL at 11:47

## 2018-03-27 RX ADMIN — HYDROCORTISONE: 25 CREAM TOPICAL at 09:22

## 2018-03-27 RX ADMIN — LOPERAMIDE HYDROCHLORIDE 2 MG: 2 CAPSULE ORAL at 12:46

## 2018-03-27 RX ADMIN — OCTREOTIDE ACETATE 100 MCG: 100 INJECTION, SOLUTION INTRAVENOUS; SUBCUTANEOUS at 17:58

## 2018-03-27 RX ADMIN — DIPHENOXYLATE HYDROCHLORIDE AND ATROPINE SULFATE 1 TABLET: 2.5; .025 TABLET ORAL at 09:12

## 2018-03-27 RX ADMIN — ENOXAPARIN SODIUM 40 MG: 40 INJECTION SUBCUTANEOUS at 21:17

## 2018-03-27 RX ADMIN — CIPROFLOXACIN HYDROCHLORIDE 500 MG: 500 TABLET, FILM COATED ORAL at 21:17

## 2018-03-27 RX ADMIN — TRAMADOL HYDROCHLORIDE 50 MG: 50 TABLET, COATED ORAL at 09:12

## 2018-03-27 RX ADMIN — Medication 2 G: at 09:10

## 2018-03-27 RX ADMIN — LOPERAMIDE HYDROCHLORIDE 2 MG: 2 CAPSULE ORAL at 09:13

## 2018-03-27 RX ADMIN — SODIUM CHLORIDE, PRESERVATIVE FREE 5 ML: 5 INJECTION INTRAVENOUS at 11:45

## 2018-03-27 RX ADMIN — BENZOCAINE 1 ML: 220 SPRAY, METERED PERIODONTAL at 16:40

## 2018-03-27 RX ADMIN — HYDROCORTISONE: 25 CREAM TOPICAL at 21:18

## 2018-03-27 RX ADMIN — DIPHENOXYLATE HYDROCHLORIDE AND ATROPINE SULFATE 1 TABLET: 2.5; .025 TABLET ORAL at 12:46

## 2018-03-27 RX ADMIN — OCTREOTIDE ACETATE 100 MCG: 100 INJECTION, SOLUTION INTRAVENOUS; SUBCUTANEOUS at 11:04

## 2018-03-27 RX ADMIN — ACETAMINOPHEN 650 MG: 325 TABLET, FILM COATED ORAL at 09:12

## 2018-03-27 ASSESSMENT — PAIN DESCRIPTION - DESCRIPTORS
DESCRIPTORS: BURNING
DESCRIPTORS: SHARP
DESCRIPTORS: BURNING
DESCRIPTORS: SORE

## 2018-03-27 NOTE — PLAN OF CARE
Problem: Patient Care Overview  Goal: Plan of Care/Patient Progress Review  Edema 7D: Notified to initiate edema services under OT orders.  Pt reports wearing thigh high compression stockings d/t BLE edema for the last ~3 years.  Pt reporting increased swelling in ankles over the past 8 weeks, and she is quite enthusiastic to work with edema team.  Skin is taut and shiny with petechia and areas of redness.  Some healing scabs and no open wounds.  Skin with soft 2+ pitting edema below the knee, with a number of indentations from catheter and sheets.  Lotion applied.  Initiated light GCB (25% stretch) using TG Soft and short-stretch bandages from MTPs to knee creases.  Pt reports comfort with fit.  Pt educated in edema and CGB precautions. OK to wear wraps for 24 hours until therapist returns, however REMOVE wraps if causing pain/numbness/discoloration or if wraps become soiled.  Reinforced importance of activity and elevation for fluid movement.  Assisted pt in elevating LEs on pillows with heel clearance.      OT 7D: Pt declining full OT session following edema therapy as her meal arrives and she would like to try eating.  Stating she would very much like to walk later. Will attempt to check back later if schedule allows, otherwise cx OT and reschedule for 3/28.

## 2018-03-27 NOTE — PROGRESS NOTES
Social Work Services Progress Note    Hospital Day: 8  Date of Initial Social Work Evaluation:  To be completed  Collaborated with:  Rosa Gonzalez [facility], Patient, patient's friend Adamaris (p: [phone])    Data:  SW consulted to meet w/ patient for discharge planning. Per PT/OT recommend TCU at discharge. NG tube placed today for patient.     Intervention:  SW received update from Rosa Gonzalez [facility], plan to place NG tube today, wondering if TCU facility is able to accept patient on Octreotide as patient has seen positive results from this medication. Per Rosa, anticipate patient will be ready for discharge Thurs vs. Fri.    SW received updates from [facility] and [facility] who had been following this patient; both facilities have now declined patient as they are unable to manage NG tube.    SW met w/ patient to provide update on discharge planning and explain that additional facilities will need to be selected as only certain facilities are able to accept patient w/ NG tube. Patient was not able to initially recall who this writer is, asks if this writer is w/ physical therapy, this writer re-introduced self and social work role. Patient able to then recall who this writer is and that this writer has been working w/ her on TCU placement. Patient able to recall that this writer would be printing off information on facilities for her to give to her today - SW did print them off and gave them to patient at this meeting. Patient appears to understand SW explanation and is understanding of needing to send additional referrals. Patient again defers to her friend Adamaris for all decision making regarding specific TCU facilities, asks this writer to contact Adamaris and send referrals to facilities indicated by Adamaris.    SW spoke w/ Adamaris and provided update and explanation for why additional referrals need to be started due to NG tube. Adamaris expresses understanding of this, requests SW send referrals to  following facilities that appear able to accept [facility] w/ [facility] (near where patient's sister Christopher lives), [facility] (Adamaris works for [facility] and is familiar w/ the facility).    REFERRALS  [facility] - Declined; do not accept patients w/ NG tube  [facility] - Declined; do not accept patients w/ NG tube  [facility] - Declined  [facility] - Referral sent.  [facility] -  will call admissions liaison Nicol tomorrow to discuss  [facility] - SW will send page to admissions liaison tomorrow to discuss    Assessment:  Patient and patient's friend Adamaris appear to be supportive of plan to discharge to TCU facility when patient is medically stable. Patient did appear confused at first as she was unable to recall this writer despite several visits by this writer during hospitalization.     Plan:    Anticipated Disposition:  Facility:  TCU, pending placement    Barriers to d/c plan:  NG tube. Waiting to see if able to accept patient on Octreotide.    Follow Up:  SW to continue following for discharge planning.    JEFF Bull, LGSW  7D Oncology SW  Phone [phone]  Pager [phone]

## 2018-03-27 NOTE — PLAN OF CARE
Problem: Diarrhea (Adult)  Goal: Identify Related Risk Factors and Signs and Symptoms  Related risk factors and signs and symptoms are identified upon initiation of Human Response Clinical Practice Guideline (CPG).   Outcome: No Change  Diarrhea decreasing. On Octreotide for diarrhea and scheduled Lomotil. Please offer Loperamide.  NG placed for feeding and awaiting verification from xray. Has farias due to perineal edema. Magensium replaced. Tramadol for right shoulder and perineal pain with some relief. Please offer Tramadol and Tylenol. Up with assistance. Please assist with turning in bed and getting up in chair.

## 2018-03-27 NOTE — PROGRESS NOTES
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     Hospital complications:    #UTI (Klebsiella pneumoniae & proteus mirabilis)  -Continue Cipro (x3/21) 500mg BID, plan for 10 days of therapy (x3/21-3/30).  -Repeat UA continues to be dirty with hyaline casts, didn't reflex to culture.    #Stage IIIA (T2 N1) SCC of anus  -Followed by Dr. Ray  -S/p 1 cycle 5-FU + mitomycin (D1 2/12) followed by 1 cycle single-agent 5-FU (d/t toxicity and age, D29 3/11) with concurrent radiation  -Completed radiation 3/23  -Supportive rectal cares ordered  -Intermittently expressing feelings of hopelessness and feeling at peace with dying, not wanting to continue with treatment. Consulted palliative care SW for assistance with illness coping and decision making, appreciate their assistance. Feeling more hopeful now that she has completed XRT, will continue supportive cares, she has f/u appt with Dr. Ray in ~2 weeks.    #Hypoxemia >> improving, now on 2 L NC likely 2/2 to volume overload/pulmonary edema & b/l Pleural effusions  Developed overnight 3/23-3/24. Hypoalbuminemic with signs of third spacing. Suspect secondary to fluid overload. S/P farias placement. CXR 3/24 10 am with small bilateral layering pleural effusions with overlying atelectasis/consolidation.   - lasix 20 mg BID on 3/24-25, now d/c as dizzy with ambulation & respiratory status improved  - IVF now 0-30 ml/hr PRN as needed to run with concurrent IV medications    Malignancy/Treatment related (chemotherapy/XRT) complications:    #Intractable diarrhea  #Nausea  #Dehydration  #Electrolyte derangements  -Suspect nausea, diarrhea secondary to mucositis from recent chemotherapy (5-FU) and XRT. C diff & enteric  panel were negative.   -Continue scheduled Lomotil. Imodium available PRN.   - Continue octreotide 100mg TID SQ while inpatient, will see if this can be continued at TCU  - Likely her anal sphincter tone is compromised, and incontinence will continue to be an issue.    -Added tincture of opium, which seemed to be helping until overnight 3/23-3/24. Holding tincture of opium at this time due to confusion (see below).  -Antiemetics also available (Zofran, Compazine). No vomiting since admission.  -High lyte SS.    #Perianal wound, secondary to radiation  - Pain with tylenol and added tramadol PRN   - Plan for tube feeding given concern for compromised wound healing after XRT with malnutrition    #Pancytopenia  -Secondary to underlying disease, recent chemo/XRT  -Transfuse for Hgb <8, PLT <10K - no transfusion needs thus far    #Weakness  #Fall at home  #Fractured R 8th rib  -Suspect weakness, falls secondary to above issues.  -Fall precautions  -PT/OT --> encouraged patient to work with them, she is very motivated but struggling with pain and weakness.   -Also has Tylenol, ketamine/gabapentin/lidocaine and diclofenac available topically, which she finds helpful, so will continue.    #Malnutrition, severity unspecified with hypoalbuminemia  - Calorie counts started 3/22  - Suspect lack of good PO intake is contributing to impaired wound healing   - NG tube placement 3/27 with plan for tube feedings. Monitor for refeeding syndrome. Labs Qdaily.     #Coagulopathy r/t severe malnutrition & infection (UTI)  -Suspect secondary to malnutrition. Dosing daily with vitamin K PRN.  -Got PO vitamin K 5 mg (3/19, 3/20, 3/21, 2x on 3/22, 3/23 and none since  -INR 1.53 today  >> wanted to start TPN but patient declined, working on PO intake    #Altered mental status >> improved/resolved likely r/t MEDS 3/25 AM  -Nonsensical statements this morning, disoriented. 3/24 AM.  -Was getting concurrent oxycodone/opium tincture. Will now  hold.  -Hypoxia 2/2 to volume overload.  -Blood culture: NTD  -Urine culture: 3/19 with klebsiella PNA on ciprofloxacin  -CXR with pleural effusions.     PAIN # Pain Assessment:  Current Pain Score 3/27/2018   Patient currently in pain? yes   Pain score (0-10) -   Pain location Perineum   Pain descriptors Burning   - Elizaebth is experiencing pain due to fractured rib, radiation treatment. Pain management was discussed and the plan was created in a collaborative fashion.  Elizabeth's response to the current recommendations: engaged  compliant  - Opioid regimen: tramadol and tylenol PRN (oxycodone and opiod tincture caused ANS)   - Pharmacologic adjuvants: topical diclofenac gel, topical ketamine/gabapentin/lidocaine cream   FEN - Fluid bolus PRN  - Start Tube Feedings via NG (3/27)  - incontinent of urine/stool (with diarrhea on imodium) s/p farias placement   PPX (VTE & GI) - lovenox SQ 40 mg daily (plt 114)  - Bowel regimen: not needed- having diarrhea   LINES & DRAINS & WOUNDS - Port  - Farias  - Wound on sacrum  - NG tube   CONSULTS - OT, PT, WOC, nutrition   CODE - FULL   DISPO - Anticipate d/c to TCU in the next 2-3 days pending control of pain, diarrhea, mental status, hypoxemia, and nutritional status.     Interval history  Met with patient, She acknowledges she isn't eating enough, and is agreeable to NG tube placement with tube feeds, especially if it will help with wound healing to have improved nutrition. She reports she had a MUCH better night, and her pain was better controlled yesterday. She was thrilled to not have a BM overnight for 12 hours, and is feeling more positive today. Farias catheter is helping with incontinence & irritation to wound. Has pain at wound site & on Rib, worse with deep breaths and coughing.       Above plan discussed with staff physician, Dr. Howe.     Rosa Gonzalez, FNP-BC  Hematology/Oncology  #[id number]        Physical Exam  Constitutional: Awake, alert, cooperative, in NAD.  Elderly/frail.   Eyes: PERRL, EOMI, sclera clear, conjunctiva normal.  ENT: Normocephalic, without obvious abnormality, moist mucus membranes, no lesions or thrush.   Respiratory: Non-labored breathing, diminished in bases, no wheezing, no rales.   Cardiovascular: RRR, no murmur noted.  GI: +BS, soft, non-distended, non-tender.  Skin: No concerning lesions or rash on exposed areas.  Musculoskeletal: 1-2+ pitting edema to BL LE.   Neurologic: Awake, A&O x 3. Cranial nerves II-XII are grossly intact.   Neuropsychiatric: Calm, normal affect, not confused, memory intact, judgement/insight intact.     Rounding:  Temp:  [95.8  F (35.4  C)-99.7  F (37.6  C)] 99.7  F (37.6  C)  Heart Rate:  [81-89] 89  Resp:  [18] 18  BP: (109-126)/(54-69) 109/62  SpO2:  [85 %-96 %] 95 %    I/O last 3 completed shifts:  In: 1058 [P.O.:320; I.V.:738]  Out: 500 [Urine:500]    Vitals:    03/20/18 0744 03/21/18 2030 03/23/18 1652   Weight: 57.6 kg (126 lb 14.4 oz) 60.5 kg (133 lb 4.8 oz) 62.9 kg (138 lb 11.2 oz)       Recent Labs  Lab 03/27/18 0624 03/26/18  0615 03/25/18  0625 03/24/18  0603   WBC 4.3 4.4 4.4 2.2*   RBC 3.57* 3.49* 3.61* 3.19*   HGB 10.8* 10.6* 11.0* 9.7*   HCT 33.5* 32.7* 33.9* 29.9*   MCV 94 94 94 94   MCH 30.3 30.4 30.5 30.4   MCHC 32.2 32.4 32.4 32.4   RDW 14.9 15.0 15.2* 15.4*    135* 114* 103*       Recent Labs  Lab 03/27/18  0624 03/26/18  0615 03/25/18  0625 03/24/18  0603 03/23/18  0603 03/22/18  0643    136 134 136 136 137   POTASSIUM 4.4 3.8 3.5 4.2 4.5 4.6   CHLORIDE 101 101 101 105 105 106   CO2 30 27 25 24 24 24   ANIONGAP 4 8 8 7 7 6   GLC 88 78 89 86 97 97   BUN 8 7 7 6* 6* 7   CR 0.58 0.57 0.68 0.70 0.56 0.54   GFRESTIMATED >90 >90 83 81 >90 >90   GFRESTBLACK >90 >90 >90 >90 >90 >90   ELISE 7.6* 7.4* 7.9* 7.2* 7.4* 7.1*   MAG 2.0 2.0 1.8 1.8 1.7 1.7   PHOS 3.7  --   --  2.8 2.0* 2.2*   PROTTOTAL 4.1* 3.9* 4.0* 3.6* 3.9* 3.6*   ALBUMIN 1.1* 1.1* 1.1* 1.0* 1.2* 1.2*   BILITOTAL 0.6 0.7 0.9 1.0 0.8  0.9   ALKPHOS 83 82 94 74 75 61   AST 12 13 14 13 14 18   ALT 14 15 18 18 20 20     No results found for this or any previous visit (from the past 24 hour(s)).  Anti-infectives (Future)    Start     Dose/Rate Route Frequency Ordered Stop    03/21/18 0830  ciprofloxacin (CIPRO) tablet 500 mg      500 mg Oral EVERY 12 HOURS SCHEDULED 03/21/18 0821 03/30/18 0800          heparin lock flush  5-10 mL Intracatheter Q24H     heparin  5 mL Intracatheter Q28 Days     octreotide  100 mcg Subcutaneous TID     calcium-vitamin D  1 tablet Oral Daily     multivitamin, therapeutic  1 tablet Oral Daily     zinc sulfate  220 mg Oral Daily     diphenoxylate-atropine  1 tablet Oral 4x Daily     ciprofloxacin  500 mg Oral Q12H OSCAR     sodium chloride (PF)  3 mL Intracatheter Q8H     hydrocortisone   Rectal BID     vitamin B complex with vitamin C  1 tablet Oral Daily     enoxaparin  40 mg Subcutaneous Q24H       - MEDICATION INSTRUCTIONS -

## 2018-03-27 NOTE — PHARMACY-CONSULT NOTE
Pharmacy Tube Feeding Consult    Medication reviewed for administration by feeding tube and for potential food/drug interactions.    Recommendation: Recommend holding tube feedings for 1 hours before and 1 hours after administration of ciprofloxacin..     Pharmacy will continue to follow as new medications are ordered.    Paige EsquivelD

## 2018-03-27 NOTE — PLAN OF CARE
Problem: Patient Care Overview  Goal: Plan of Care/Patient Progress Review    VSS. Afebrile. Pt had two incontinent, loose stools today. Lomotil given as scheduled. Wound care completed per WOC protocol in orders. Poor PO intake. Pt nibbled at salad and hard boiled egg all evening. Port-a-cath needle and dressing changed. Joiner cares done. Continue POC.

## 2018-03-27 NOTE — PROGRESS NOTES
"CLINICAL NUTRITION SERVICES - ASSESSMENT NOTE     Nutrition Prescription    RECOMMENDATIONS FOR MDs/PROVIDERS TO ORDER:  Recommend ordering MIVF's for hydration with TF initiation and adv to goal since fluids needs will not be met with TF at this time.    Malnutrition Status:    Severe malnutrition in the context of acute on chronic illness    Recommendations already ordered by Registered Dietitian (RD):   1. Orders entered to initiate and advance TF as follows: Peptamen 1.5 at 10 ml/hr x 12 hrs with adv by 10 ml every 12 hrs to goal of 45 ml/hr. Regimen to provide 1080 ml/day, 1620 kcals (32 kcal/kg/day), 73 g PRO (1.5 g/kg/day), 832 ml free H2O, 60 g Fat (70% from MCTs), 203 g CHO and no Fiber daily.  2. Order daily check of K+, Mg and PO4 until TF adv to goal rate to evaluate for refeeding and need for lyte replacement (per already ordered lyte protocol).  3. H2O flushes of 30 ml every 4 hrs.         REASON FOR ASSESSMENT  Elizabeth Taylor is a/an 77 year old female assessed by the dietitian for LOS and Provider Order - Registered Dietitian to Assess and Order TF per Medical Nutrition Therapy Protocol    NUTRITION HISTORY  Per chart review, pt has been followed by outpatient RD per MNT related to anal cancer.  Pt presented on admission with h/o poor appetite x 1 week.    CURRENT NUTRITION ORDERS  Diet: Regular with Ensure Plus betw meals (substituted with Boost Plus)  Intake/Tolerance: Ave po intakes per calorie counts x 2 days (3/22- 3/24, but no intakes recorded on 3/23)  = 348 kcals and 12 g PRO.  - Pt reports difficulty with eating d/t \"everything burns her mouth\". Asked to see her tongue, but pt unable to follow through with this writer's request. Noted pt had a hamburger herson and a bowl of lettuce at her bedside (untouched and had been sitting there since 1:57 pm today) that she was fixated on eating. Pt also c/o stomach aches, but unable to elaborate.     LABS  K+, Mg and PO4 WNL, but potential for " "values to decline d/t refeeding risk with TF implementation.    MEDICATIONS  Noted IVF's of D5% discontinued today. Pt will continue to require MIVF's until tolerating TF at goal rate and tanya to adjusted H20 flushes.    ANTHROPOMETRICS  Height: 165.1 cm (5' 5\")  Most Recent Weight: 62.9 kg (138 lb 11.2 oz) - 3/23, Admit wt = 57.6 kg (126 lbs) on 3/20 - lowest wt this admission), but question if falsely elevated based on review of prior wts over past several weeks.  IBW: 56.8 kg  BMI: Normal BMI  Weight History: Pt reports her UBW is around 126 lbs. However, suspect current wt is falsely elevated d/t edema. Pt unable to commented on her lower wt of 110 -112 lbs x 3 weeks ago per review of EMR  Wt Readings from Last 10 Encounters:   03/23/18 62.9 kg (138 lb 11.2 oz)   03/19/18 59.3 kg (130 lb 12.8 oz)   03/17/18 55.2 kg (121 lb 11.1 oz)   03/17/18 55.2 kg (121 lb 9.6 oz)   03/15/18 50.8 kg (112 lb)   03/13/18 52.9 kg (116 lb 9.6 oz)   03/09/18 51.2 kg (112 lb 12.8 oz)   03/09/18 51.2 kg (112 lb 12.8 oz)   03/05/18 49.4 kg (109 lb)   03/02/18 50.3 kg (110 lb 12.8 oz)       Dosing Weight: 50 kg (lowest wt per review of EMR)    ASSESSED NUTRITION NEEDS  Estimated Energy Needs: 2924-9056 kcals/day (30 - 35 kcals/kg )  Justification: Repletion  Estimated Protein Needs: 60-75 grams protein/day (1.2 - 1.5 grams of pro/kg)  Justification: Repletion  Estimated Fluid Needs: (1 mL/kcal)   Justification: Maintenance    PHYSICAL FINDINGS  See malnutrition section below.  Poor skin turgor   NGT placed at bedside. Per AXR, concern for obstruction d/t abnormal small bowel dilation.     MALNUTRITION  (Limited to upper extremities)  % Intake: </= 50% for >/= 5 days (severe)  % Weight Loss: Unable to assess d/t pt is fluid wt up  Subcutaneous Fat Loss: Facial region: Moderate, Upper arm and Thoracic/intercostal: Severe   Muscle Loss: Temporal and Facial & jaw region: Moderate, Scapular bone,, Thoracic region (clavicle, acromium bone, " deltoid, trapezius, pectoral) and Upper arm (bicep, tricep): Severe.  Fluid Accumulation/Edema: Unable to assess d/t lower legs wrapped. Per chart review, Moderate   Malnutrition Diagnosis: Severe malnutrition in the context of acute on chronic illness    NUTRITION DIAGNOSIS  Inadequate protein-energy intake related to mouth and stomach pain hinder po and concern for altered GI function d/t diarrhea hindering absorption and TF therapy ordered, but not yet initiated as evidenced by ave po intakes per calorie counts meting < 25% of estimated needs, evidence of subcutaneous fat and muscle wasting and no TF intakes received yet.     INTERVENTIONS  Implementation  Nutrition Education: Provided education on plan for TF therapy. Unclear of pt's understanding of initiation of TF. Noted pt was distracted by her hambuger herson and lettuce at the bedside.  Collaboration with NP regarding FT position and concern for TF tolerance. NP discussed results of AXR with staff and decision made to adv FT post-pyloric and proceed with plan for TF implementation.  Enteral Nutrition - Initiate  Feeding tube flush     Goals  Total avg nutritional intake to meet a minimum of 30 kcal/kg and 1.2 g PRO/kg daily (per dosing wt 50 kg).     Monitoring/Evaluation  Progress toward goals will be monitored and evaluated per protocol.  Belen Aaron RD,LD  Unit Pager [phone]

## 2018-03-27 NOTE — PROGRESS NOTES
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 \"   Edema Precautions Cardiac Edema/CHF;Active Cancer   Edema Precaution Comments monitor platelets, pulmonary edema   Edema Examination / Assessment   Skin Condition Pitting;Venous distention;Hemosiderin deposits;Dryness   Skin Condition Comments Several small red spots, petechia, taut, shiny skin   Scar No   Ulcerations No   Capillary Refill Symmetrical   Stemmer Sign Positive   Stemmer Sign Comments B feet   ROM   Range of Motion (WFL) no deficits were identified   Strength   Strength (WFL) other (describe)   Description of Strength Deficits generalized weaknesasa   Sensation   Sensation (WFL) no deficits were identified   Assessment/Plan   Patient presents with Edema   Assessment Pt presenting with pitting edema in BLE that has worsened recently and is interfering with functional mobility and ADL/IADL participation.     Assessment of Occupational Performance 1-3 Performance Deficits   Identified Performance Deficits functional mobility and ADL   Clinical Decision Making (Complexity) Low complexity   Planned Edema Interventions Manual lymph drainage;Gradient compression bandaging;Exercises;Precautions to prevent infection/exacerbation;Education   Treatment Frequency 5 times/wk   Treatment Duration 8 days   Patient, Family and/or Staff in agreement with plan of care. Yes "   Risks and benefits of treatment have been explained. Yes   Total Evaluation Time   Total Evaluation Time (Minutes) 8

## 2018-03-27 NOTE — PROGRESS NOTES
Clinic Care Coordination Contact  Care Team Conversations    Chart reviewed, noted that pt remains hospitalized.  Will continue to monitor for discharge from facility and plan outreach at that time.

## 2018-03-27 NOTE — PLAN OF CARE
Problem: Patient Care Overview  Goal: Plan of Care/Patient Progress Review  Alert and oriented X 4. AVSS. Desaturated to 85% on RA. Shallow breathing. C/o rib pain with deep breathing. Cough and deep breathing encouraged. She states in the last couple days she's been able to cough. SpO2 92-94% on 2 LPM/NC. Perineum remains excoriated. No stool during the night. Turned and repositioned. Barrier ointment as needed. She states she's been sleeping well.

## 2018-03-28 ENCOUNTER — APPOINTMENT (OUTPATIENT)
Dept: CT IMAGING | Facility: CLINIC | Age: 78
DRG: 393 | End: 2018-03-28
Attending: NURSE PRACTITIONER
Payer: MEDICARE

## 2018-03-28 ENCOUNTER — APPOINTMENT (OUTPATIENT)
Dept: OCCUPATIONAL THERAPY | Facility: CLINIC | Age: 78
DRG: 393 | End: 2018-03-28
Payer: MEDICARE

## 2018-03-28 ENCOUNTER — APPOINTMENT (OUTPATIENT)
Dept: PHYSICAL THERAPY | Facility: CLINIC | Age: 78
DRG: 393 | End: 2018-03-28
Payer: MEDICARE

## 2018-03-28 LAB
ALBUMIN SERPL-MCNC: 1 G/DL (ref 3.4–5)
ALP SERPL-CCNC: 81 U/L (ref 40–150)
ALT SERPL W P-5'-P-CCNC: 14 U/L (ref 0–50)
ANION GAP SERPL CALCULATED.3IONS-SCNC: 6 MMOL/L (ref 3–14)
APTT PPP: 38 SEC (ref 22–37)
AST SERPL W P-5'-P-CCNC: 14 U/L (ref 0–45)
BASOPHILS # BLD AUTO: 0 10E9/L (ref 0–0.2)
BASOPHILS NFR BLD AUTO: 0 %
BILIRUB SERPL-MCNC: 0.6 MG/DL (ref 0.2–1.3)
BUN SERPL-MCNC: 8 MG/DL (ref 7–30)
CALCIUM SERPL-MCNC: 7.5 MG/DL (ref 8.5–10.1)
CHLORIDE SERPL-SCNC: 102 MMOL/L (ref 94–109)
CO2 SERPL-SCNC: 28 MMOL/L (ref 20–32)
CREAT SERPL-MCNC: 0.59 MG/DL (ref 0.52–1.04)
DIFFERENTIAL METHOD BLD: ABNORMAL
EOSINOPHIL # BLD AUTO: 0 10E9/L (ref 0–0.7)
EOSINOPHIL NFR BLD AUTO: 0.8 %
ERYTHROCYTE [DISTWIDTH] IN BLOOD BY AUTOMATED COUNT: 15 % (ref 10–15)
GFR SERPL CREATININE-BSD FRML MDRD: >90 ML/MIN/1.7M2
GLUCOSE SERPL-MCNC: 71 MG/DL (ref 70–99)
HCT VFR BLD AUTO: 34.1 % (ref 35–47)
HGB BLD-MCNC: 10.9 G/DL (ref 11.7–15.7)
INR PPP: 1.44 (ref 0.86–1.14)
LYMPHOCYTES # BLD AUTO: 0.1 10E9/L (ref 0.8–5.3)
LYMPHOCYTES NFR BLD AUTO: 0.9 %
MAGNESIUM SERPL-MCNC: 2 MG/DL (ref 1.6–2.3)
MCH RBC QN AUTO: 30.4 PG (ref 26.5–33)
MCHC RBC AUTO-ENTMCNC: 32 G/DL (ref 31.5–36.5)
MCV RBC AUTO: 95 FL (ref 78–100)
METAMYELOCYTES # BLD: 0.1 10E9/L
METAMYELOCYTES NFR BLD MANUAL: 0.9 %
MONOCYTES # BLD AUTO: 0.1 10E9/L (ref 0–1.3)
MONOCYTES NFR BLD AUTO: 0.9 %
MYELOCYTES # BLD: 0.1 10E9/L
MYELOCYTES NFR BLD MANUAL: 0.9 %
NEUTROPHILS # BLD AUTO: 5.7 10E9/L (ref 1.6–8.3)
NEUTROPHILS NFR BLD AUTO: 95.6 %
NRBC # BLD AUTO: 0.1 10*3/UL
NRBC BLD AUTO-RTO: 2 /100
PHOSPHATE SERPL-MCNC: 2.6 MG/DL (ref 2.5–4.5)
PLATELET # BLD AUTO: 197 10E9/L (ref 150–450)
PLATELET # BLD EST: ABNORMAL 10*3/UL
POTASSIUM SERPL-SCNC: 4.2 MMOL/L (ref 3.4–5.3)
PROT SERPL-MCNC: 4 G/DL (ref 6.8–8.8)
RBC # BLD AUTO: 3.59 10E12/L (ref 3.8–5.2)
RBC MORPH BLD: NORMAL
SODIUM SERPL-SCNC: 136 MMOL/L (ref 133–144)
WBC # BLD AUTO: 6 10E9/L (ref 4–11)

## 2018-03-28 PROCEDURE — 97110 THERAPEUTIC EXERCISES: CPT | Mod: GP | Performed by: PHYSICAL THERAPIST

## 2018-03-28 PROCEDURE — 25000132 ZZH RX MED GY IP 250 OP 250 PS 637: Mod: GY | Performed by: NURSE PRACTITIONER

## 2018-03-28 PROCEDURE — 74177 CT ABD & PELVIS W/CONTRAST: CPT

## 2018-03-28 PROCEDURE — 25000128 H RX IP 250 OP 636: Performed by: RADIOLOGY

## 2018-03-28 PROCEDURE — 83735 ASSAY OF MAGNESIUM: CPT | Performed by: PHYSICIAN ASSISTANT

## 2018-03-28 PROCEDURE — 85610 PROTHROMBIN TIME: CPT | Performed by: PHYSICIAN ASSISTANT

## 2018-03-28 PROCEDURE — 25000128 H RX IP 250 OP 636: Performed by: NURSE PRACTITIONER

## 2018-03-28 PROCEDURE — 25000132 ZZH RX MED GY IP 250 OP 250 PS 637: Mod: GY | Performed by: INTERNAL MEDICINE

## 2018-03-28 PROCEDURE — A9270 NON-COVERED ITEM OR SERVICE: HCPCS | Mod: GY | Performed by: INTERNAL MEDICINE

## 2018-03-28 PROCEDURE — 40000133 ZZH STATISTIC OT WARD VISIT: Performed by: OCCUPATIONAL THERAPIST

## 2018-03-28 PROCEDURE — 99233 SBSQ HOSP IP/OBS HIGH 50: CPT | Performed by: INTERNAL MEDICINE

## 2018-03-28 PROCEDURE — A9270 NON-COVERED ITEM OR SERVICE: HCPCS | Mod: GY | Performed by: NURSE PRACTITIONER

## 2018-03-28 PROCEDURE — 84100 ASSAY OF PHOSPHORUS: CPT | Performed by: PHYSICIAN ASSISTANT

## 2018-03-28 PROCEDURE — 25000128 H RX IP 250 OP 636: Performed by: PHYSICIAN ASSISTANT

## 2018-03-28 PROCEDURE — 40000193 ZZH STATISTIC PT WARD VISIT: Performed by: PHYSICAL THERAPIST

## 2018-03-28 PROCEDURE — 25000125 ZZHC RX 250: Performed by: RADIOLOGY

## 2018-03-28 PROCEDURE — 25000125 ZZHC RX 250: Performed by: PHYSICIAN ASSISTANT

## 2018-03-28 PROCEDURE — 97530 THERAPEUTIC ACTIVITIES: CPT | Mod: GP | Performed by: PHYSICAL THERAPIST

## 2018-03-28 PROCEDURE — 36592 COLLECT BLOOD FROM PICC: CPT | Performed by: PHYSICIAN ASSISTANT

## 2018-03-28 PROCEDURE — 25000128 H RX IP 250 OP 636: Performed by: INTERNAL MEDICINE

## 2018-03-28 PROCEDURE — 25000132 ZZH RX MED GY IP 250 OP 250 PS 637: Mod: GY | Performed by: PHYSICIAN ASSISTANT

## 2018-03-28 PROCEDURE — 85025 COMPLETE CBC W/AUTO DIFF WBC: CPT | Performed by: PHYSICIAN ASSISTANT

## 2018-03-28 PROCEDURE — 85730 THROMBOPLASTIN TIME PARTIAL: CPT | Performed by: PHYSICIAN ASSISTANT

## 2018-03-28 PROCEDURE — 97140 MANUAL THERAPY 1/> REGIONS: CPT | Mod: GO | Performed by: OCCUPATIONAL THERAPIST

## 2018-03-28 PROCEDURE — A9270 NON-COVERED ITEM OR SERVICE: HCPCS | Mod: GY | Performed by: PHYSICIAN ASSISTANT

## 2018-03-28 PROCEDURE — 12000008 ZZH R&B INTERMEDIATE UMMC

## 2018-03-28 PROCEDURE — 80053 COMPREHEN METABOLIC PANEL: CPT | Performed by: PHYSICIAN ASSISTANT

## 2018-03-28 RX ORDER — LOPERAMIDE HCL 2 MG
2 CAPSULE ORAL
Status: DISCONTINUED | OUTPATIENT
Start: 2018-03-28 | End: 2018-03-30

## 2018-03-28 RX ORDER — IOPAMIDOL 755 MG/ML
85 INJECTION, SOLUTION INTRAVASCULAR ONCE
Status: COMPLETED | OUTPATIENT
Start: 2018-03-28 | End: 2018-03-28

## 2018-03-28 RX ORDER — FUROSEMIDE 10 MG/ML
40 INJECTION INTRAMUSCULAR; INTRAVENOUS ONCE
Status: COMPLETED | OUTPATIENT
Start: 2018-03-28 | End: 2018-03-28

## 2018-03-28 RX ORDER — DIPHENOXYLATE HCL/ATROPINE 2.5-.025MG
1 TABLET ORAL
Status: DISCONTINUED | OUTPATIENT
Start: 2018-03-28 | End: 2018-03-30

## 2018-03-28 RX ORDER — OCTREOTIDE ACETATE 100 UG/ML
100 INJECTION, SOLUTION INTRAVENOUS; SUBCUTANEOUS
Status: DISCONTINUED | OUTPATIENT
Start: 2018-03-28 | End: 2018-03-30

## 2018-03-28 RX ADMIN — TRAMADOL HYDROCHLORIDE 50 MG: 50 TABLET, COATED ORAL at 07:55

## 2018-03-28 RX ADMIN — ACETAMINOPHEN 650 MG: 325 TABLET, FILM COATED ORAL at 15:56

## 2018-03-28 RX ADMIN — IOPAMIDOL 85 ML: 755 INJECTION, SOLUTION INTRAVENOUS at 13:17

## 2018-03-28 RX ADMIN — TRAMADOL HYDROCHLORIDE 50 MG: 50 TABLET, COATED ORAL at 16:06

## 2018-03-28 RX ADMIN — CIPROFLOXACIN HYDROCHLORIDE 500 MG: 500 TABLET, FILM COATED ORAL at 19:16

## 2018-03-28 RX ADMIN — SODIUM CHLORIDE 500 ML: 9 INJECTION, SOLUTION INTRAVENOUS at 11:36

## 2018-03-28 RX ADMIN — SODIUM CHLORIDE, PRESERVATIVE FREE 71 ML: 5 INJECTION INTRAVENOUS at 13:18

## 2018-03-28 RX ADMIN — POTASSIUM PHOSPHATE, MONOBASIC AND POTASSIUM PHOSPHATE, DIBASIC 10 MMOL: 224; 236 INJECTION, SOLUTION INTRAVENOUS at 19:19

## 2018-03-28 RX ADMIN — ACETAMINOPHEN 650 MG: 325 TABLET, FILM COATED ORAL at 07:55

## 2018-03-28 RX ADMIN — CIPROFLOXACIN HYDROCHLORIDE 500 MG: 500 TABLET, FILM COATED ORAL at 07:55

## 2018-03-28 RX ADMIN — FUROSEMIDE 40 MG: 10 INJECTION, SOLUTION INTRAVENOUS at 18:06

## 2018-03-28 RX ADMIN — HYDROCORTISONE: 25 CREAM TOPICAL at 22:01

## 2018-03-28 RX ADMIN — HYDROCORTISONE: 25 CREAM TOPICAL at 07:55

## 2018-03-28 RX ADMIN — ONDANSETRON 8 MG: 8 TABLET, ORALLY DISINTEGRATING ORAL at 00:14

## 2018-03-28 RX ADMIN — DIPHENHYDRAMINE HYDROCHLORIDE AND LIDOCAINE HYDROCHLORIDE AND ALUMINUM HYDROXIDE AND MAGNESIUM HYDRO 5 ML: KIT at 19:09

## 2018-03-28 RX ADMIN — SODIUM CHLORIDE 1000 ML: 9 INJECTION, SOLUTION INTRAVENOUS at 15:51

## 2018-03-28 RX ADMIN — SODIUM CHLORIDE, PRESERVATIVE FREE 5 ML: 5 INJECTION INTRAVENOUS at 05:56

## 2018-03-28 RX ADMIN — ENOXAPARIN SODIUM 40 MG: 40 INJECTION SUBCUTANEOUS at 19:21

## 2018-03-28 RX ADMIN — LOPERAMIDE HYDROCHLORIDE 2 MG: 2 CAPSULE ORAL at 05:30

## 2018-03-28 RX ADMIN — DIPHENOXYLATE HYDROCHLORIDE AND ATROPINE SULFATE 1 TABLET: 2.5; .025 TABLET ORAL at 07:54

## 2018-03-28 RX ADMIN — Medication 2 G: at 18:14

## 2018-03-28 ASSESSMENT — PAIN DESCRIPTION - DESCRIPTORS
DESCRIPTORS: BURNING;SORE
DESCRIPTORS: SORE

## 2018-03-28 NOTE — PLAN OF CARE
Problem: Patient Care Overview  Goal: Plan of Care/Patient Progress Review  Discharge Planner PT   Patient plan for discharge: TCU  Current status: Pt tx supine>sit at EOB with mod-max A and HOB elevated. Pt tx sit<>stand with FWW and CGA, VCs for forward trunk lean. Pt stepped bed<>BSC and laterally along bedside with FWW and CGA, VCs for sequencing. Pt instructed in supine therex, deferred amb d/t significant report of dizziness and orthostatic hypotension prolonged upright activity: sitting /67 (88) mmHg > standing BP 87/54 (65) mmHg.   Barriers to return to prior living situation: Level of assist; medical status. Lives alone, stairs.  Recommendations for discharge: TCU  Rationale for recommendations: Continued therapy to progress IND and endurance with functional mobility prior to return to home.       Entered by: Felisha Beebe 03/28/2018 4:57 PM

## 2018-03-28 NOTE — PLAN OF CARE
Problem: Diarrhea (Adult)  Goal: Identify Related Risk Factors and Signs and Symptoms  Related risk factors and signs and symptoms are identified upon initiation of Human Response Clinical Practice Guideline (CPG).   Outcome: No Change  Here for diarrhea r/t radiation therapy for anal SCC. VSS on 2 L O2. + orthostatic BP-MD aware, continue to monitor. NG tube in L nare-NG tube advanced and placement now confirmed. Lateral/decub abd xray also obtained-no free air in abd though possible bowel obstruction and possible hydropneumothorax r/t rib fx. TF on hold per Dr Weinberg until tomorrow. No stools this shift. Evening anti-diarrheals held. Kenisha-anal area excoriated with extensive skin breakdown. Ointment applied. Joiner patent with dark nelson urine. Generalized edema. Lymphedema wraps in place.

## 2018-03-28 NOTE — PLAN OF CARE
Problem: Patient Care Overview  Goal: Plan of Care/Patient Progress Review  OT 7D: Attempted to see pt this pm however pt taken OOR for procedure.  Cx and reschedule for 2/29.

## 2018-03-28 NOTE — PROCEDURES
Radiotherapy Treatment Summary          Date of Report: 2018     PATIENT: GERHARD TAYLOR  MEDICAL RECORD NO: [id number]  : 1940     DIAGNOSIS: C21.1 Malignant neoplasm of anal canal  INTENT OF RADIOTHERAPY: Cure  PATHOLOGY: Squamous cell carcinoma  STAGE: cT2 N1a M0                                 CONCURRENT SYSTEMIC THERAPY: Mitomycin-C and 5-FU (second cycle was delivered with 5-FU alone due to concern for patient's functional status and treatment-related toxicity)                  Details of the treatments summarized below are found in records kept in the Department of Radiation Oncology at [facility].     Treatment Summary:  Treatment Site Total Dose Modality From  To  Days Fx.  Anal canal  5,400 cGy 06 X  2/12/2018 3/23/2018 39 30  Left groin   5,040 cGy 06 X  2/12/2018 3/23/2018 39 30  Pelvis and Rt groin 4,500 cGy 06 X  2/12/2018 3/23/2018 39 30     Dose per Fraction:        Anal canal: 180 cGy          Left groin: 168 cGy  Pelvis and Rt groin: 150 cGy     COMMENTS:                    Ms. 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 Staging evaluation revealed a 2.5 cm mass located approximately 1 cm from the anal verge with involvement of the internal anal sphincter but not external sphincter or levator ani in addition to two FDG-avid left inguinal lymph nodes.     Ms. Taylor received definitive chemoradiotherapy with curative intent. The primary tumor was treated to a total dose of 54 Gy while the node-positive left groin received 50.4 Gy with the right groin and pelvis treated to 45 Gy in 30 daily fractions using a simultaneous integrated boost technique. Radiation was delivered with concurrent Mitomycin-C and infusional 5-FU with Mitomycin held with her second cycle of therapy due to a concern for treatment-related  toxicities given her age and borderline functional status.       Ms. Taylor tolerated the initiation of chemoradiotherapy reasonably well with relatively mild acute treatment-related toxicities. Approximately midway through her radiotherapy treatment course, she developed worsening diarrhea which was initially managed with loperamide and escalated to tincture of opium with IV fluid rehydration when she continued to have >6-8 bowel movements per day. Despite escalation in her medical management, she continued to have intractable diarrhea and the decision was made to admit her at the beginning of her final week of radiotherapy on 3/19/2018 for further evaluation and cares due to her uncontrolled symptoms, declining functional status and concern for her ability to care for herself after she suffered a fall while at home. She completed the remainder of her radiotherapy treatment as an inpatient without incident. Overall, she did not have any unplanned breaks in radiotherapy.      Acute Toxicity Profile by CTC v5.0:  Fatigue: Grade 1  Nausea: Grade 1  Diarrhea: Grade 3  Dermatitis: Grade 2     PAIN MANAGEMENT: Deferral to inpatient service     FOLLOW UP PLAN: Follow-up in radiation oncology clinic in 2 weeks with NP and in 4 weeks with MD                           Resident Physician: Juan Don M.D.   Staff Physician: Zaire Madison M.D.  Physicist: Leonila Gilbert, PhD     CC:   MD Shen Garcia MD                                 Radiation Oncology:  [facility] [address] [place] [address]

## 2018-03-28 NOTE — PLAN OF CARE
Problem: Diarrhea (Adult)  Goal: Identify Related Risk Factors and Signs and Symptoms  Related risk factors and signs and symptoms are identified upon initiation of Human Response Clinical Practice Guideline (CPG).   Outcome: No Change  Had one diarrhea stool this a.m. Antidiarrheal medication discontinue due to concern for possible partial bowel obstruction. Had an abdominal CT scan. Please assistance  with frequent turning. Urine output decreased so will give  Normal saline bolus. Please encourage her to get out of bed. Please offer her Tramadol and Tylenol for perineal and back pain.

## 2018-03-28 NOTE — PLAN OF CARE
Problem: Patient Care Overview  Goal: Plan of Care/Patient Progress Review  Alert. Denies difficulty breathing. SpO2 92% on 3 LPM/NC. C/o pain in right shoulder. Repositioned with relief. Pt had two large loose bowel movements. Loperamide administered X 1. Perineum, thighs and buttocks remain excoriated. Cleansed gently and barrier ointment applied.

## 2018-03-28 NOTE — PROGRESS NOTES
ADDENDUM: CT CAP shows SBO; Continue to hold tube feeds, NPO at this time.  Chest with pneumothorax, small, likely unable to effectively address as could easily recur given right rib fractures.  Ascites and fluid overloaded, 40mg Lasix now. Continue to provide supportive cares.     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     Hospital complications:    #UTI (Klebsiella pneumoniae & proteus mirabilis)  -Continue Cipro (x3/21) 500mg BID, plan for 10 days of therapy (x3/21-3/30).  -Repeat UA continues to be dirty with hyaline casts, didn't reflex to culture.    #Stage IIIA (T2 N1) SCC of anus  -Followed by Dr. Ray  -S/p 1 cycle 5-FU + mitomycin (D1 2/12) followed by 1 cycle single-agent 5-FU (d/t toxicity and age, D29 3/11) with concurrent radiation  -Completed radiation 3/23  -Supportive rectal cares ordered  -Intermittently expressing feelings of hopelessness and feeling at peace with dying, not wanting to continue with treatment. Consulted palliative care SW for assistance with illness coping and decision making, appreciate their assistance. Feeling more hopeful now that she has completed XRT, will continue supportive cares, she has f/u appt with Dr. Ray in ~2 weeks.    #Hypoxemia >> recurrence, now on 2-3 L NC likely 2/2 to volume overload/pulmonary edema & b/l Pleural effusions  Developed overnight 3/23-3/24. Hypoalbuminemic with signs of third spacing. Suspect secondary to fluid overload. S/P farias placement. CXR 3/24 10 am with small bilateral layering pleural effusions with overlying atelectasis/consolidation.   - lasix 20 mg BID on 3/24-25, now d/c as dizzy with ambulation & respiratory status improved.  - Recurrence of hypoxia 3/27, with  concern for hydropneumothorax of right lung possibly secondary to rib fractures.  CT CAP to assess this further.      Malignancy/Treatment related (chemotherapy/XRT) complications:    #Intractable diarrhea  #Nausea  #Dehydration  #Electrolyte derangements  -Suspect nausea, diarrhea secondary to mucositis from recent chemotherapy (5-FU) and XRT. C diff & enteric panel were negative.   - AXR 3/27 for NG tube confirmation noted concern for possible SBO; Plan for CT CAP this afternoon.    - Previously scheduled Lomotil and octreotide 100mg TID SQ while inpatient, will see if this can be continued at TCU. Imodium available PRN. All antidiarrheals are  on hold given concern for possible SBO.  - Likely her anal sphincter tone is compromised, and incontinence will continue to be an issue.    -Added tincture of opium, which seemed to be helping until overnight 3/23-3/24. Holding tincture of opium at this time due to confusion (see below).  -Antiemetics also available (Zofran, Compazine). No vomiting since admission.  -High lyte SS.    #Perianal wound, secondary to radiation  - Pain controlled with tylenol and tramadol PRN   - Appreciate [facility] assistance in management of this wound  - Plan for tube feeding given concern for compromised wound healing after XRT with malnutrition    #Pancytopenia  -Secondary to underlying disease, recent chemo/XRT  -Transfuse for Hgb <8, PLT <10K - no transfusion needs thus far    #Weakness  #Fall at home  #Fractured R 8th, 9th and 10th rib  -Suspect weakness, falls secondary to above issues.  -Fall precautions  -PT/OT --> encouraged patient to work with them, she is very motivated but struggling with pain and weakness.   -Also has Tylenol, ketamine/gabapentin/lidocaine and diclofenac available topically, which she finds helpful, so will continue.    #Malnutrition, severity unspecified with hypoalbuminemia  - Calorie counts started 3/22  - Suspect lack of good PO intake is contributing to impaired  wound healing   - NG tube placement 3/27 with plan for tube feedings. Awaiting further imaging with CT CAP prior to starting feeds.  Monitor for refeeding syndrome. Labs Qdaily.     #Coagulopathy r/t severe malnutrition & infection (UTI)  -Suspect secondary to malnutrition. Dosing daily with vitamin K PRN.  -Got PO vitamin K 5 mg (3/19, 3/20, 3/21, 2x on 3/22, 3/23 and 3/27 (for INR of 1.87) none since  -INR 1/44 today   >> wanted to start TPN but patient declined, working on PO intake and TF    #Altered mental status >> improved/resolved likely r/t MEDS 3/25 AM  -Nonsensical statements this morning, disoriented. 3/24 AM.  -Was getting concurrent oxycodone/opium tincture. Will now hold.  -Hypoxia 2/2 to volume overload.  -Blood culture: NTD  -Urine culture: 3/19 with klebsiella PNA on ciprofloxacin  -CXR with pleural effusions.     PAIN # Pain Assessment:  Current Pain Score 3/28/2018   Patient currently in pain? yes   Pain score (0-10) -   Pain location Perineum   Pain descriptors Burning;Sore   - Elizabeth is experiencing pain due to fractured ribs, radiation treatment. Pain management was discussed and the plan was created in a collaborative fashion.  Elizabeth's response to the current recommendations: engaged  compliant  - Opioid regimen: tramadol and tylenol PRN (oxycodone and opiod tincture caused ANS)   - Pharmacologic adjuvants: topical diclofenac gel, topical ketamine/gabapentin/lidocaine cream   FEN - Fluid bolus PRN, 500cc bolus x 1 due to orthostatic overnight and NPO for scan  - Start Tube Feedings via NG (3/27)  - incontinent of urine/stool (with diarrhea on imodium) s/p farias placement   PPX (VTE & GI) - lovenox SQ 40 mg daily (plt 114)  - Bowel regimen: not needed- having diarrhea   LINES & DRAINS & WOUNDS - Port  - Farias  - Wound on sacrum  - NG tube   CONSULTS - OT, PT, WOC, nutrition   CODE - FULL   DISPO - Anticipate d/c to TCU in the next 2-3 days pending results of further imaging, control of pain,  "diarrhea, hypoxemia, and nutritional status.     Interval history  Met with patient, She reports she had incontinence of stool again x 2 overnight, and that she is very disheartened and states \"no person should have to deal with this.\" She is frustrated with how poorly she feels, secondary to significant diarrhea, perianal pain and weakness. She is emotional this AM, understandably. She finds her pain is well controlled with tylenol and tramadol.  She has nausea this AM, but no vomiting. She feels her abdomen is more full as well.  Joiner catheter is helping with incontinence & irritation to wound. Has pain at wound site & on ribs, worse with deep breaths and coughing.       Above plan discussed with staff physician, Dr. Argueta.     Rosa Gonzalez, [facility]  Hematology/Oncology  #[id number]        Physical Exam  Constitutional: Awake, alert, cooperative, in NAD. Elderly/frail and cachectic.   Eyes: PERRL, EOMI, sclera clear, conjunctiva normal.  ENT: Normocephalic, without obvious abnormality, moist mucus membranes, no lesions or thrush.   Respiratory: Non-labored breathing, diminished in bases, no wheezing, no rales.   Cardiovascular: RRR, no murmur noted.  GI: +BS, soft, non-distended, non-tender.  Skin: No concerning lesions or rash on exposed areas.  Musculoskeletal: 1-2+ pitting edema to BL LE.   Neurologic: Awake, A&O x 3. Cranial nerves II-XII are grossly intact.   Neuropsychiatric: Calm, normal affect, not confused, memory intact, judgement/insight intact.     Rounding:  Temp:  [96.5  F (35.8  C)-99.2  F (37.3  C)] 98.5  F (36.9  C)  Pulse:  [90-91] 91  Heart Rate:  [80-93] 89  Resp:  [20] 20  BP: ()/(52-60) 102/57  SpO2:  [90 %-95 %] 93 %    I/O last 3 completed shifts:  In: 260 [P.O.:240; I.V.:20]  Out: 380 [Urine:380]    Vitals:    03/20/18 0744 03/21/18 2030 03/23/18 1652   Weight: 57.6 kg (126 lb 14.4 oz) 60.5 kg (133 lb 4.8 oz) 62.9 kg (138 lb 11.2 oz)       Recent Labs  Lab 03/28/18  0556 03/27/18  0624 " 03/26/18  0615 03/25/18  0625   WBC 6.0 4.3 4.4 4.4   RBC 3.59* 3.57* 3.49* 3.61*   HGB 10.9* 10.8* 10.6* 11.0*   HCT 34.1* 33.5* 32.7* 33.9*   MCV 95 94 94 94   MCH 30.4 30.3 30.4 30.5   MCHC 32.0 32.2 32.4 32.4   RDW 15.0 14.9 15.0 15.2*    165 135* 114*       Recent Labs  Lab 03/28/18  0556 03/27/18  0624 03/26/18  0615 03/25/18  0625 03/24/18  0603 03/23/18  0603    135 136 134 136 136   POTASSIUM 4.2 4.4 3.8 3.5 4.2 4.5   CHLORIDE 102 101 101 101 105 105   CO2 28 30 27 25 24 24   ANIONGAP 6 4 8 8 7 7   GLC 71 88 78 89 86 97   BUN 8 8 7 7 6* 6*   CR 0.59 0.58 0.57 0.68 0.70 0.56   GFRESTIMATED >90 >90 >90 83 81 >90   GFRESTBLACK >90 >90 >90 >90 >90 >90   ELISE 7.5* 7.6* 7.4* 7.9* 7.2* 7.4*   MAG 2.0 2.0 2.0 1.8 1.8 1.7   PHOS 2.6 3.7  --   --  2.8 2.0*   PROTTOTAL 4.0* 4.1* 3.9* 4.0* 3.6* 3.9*   ALBUMIN 1.0* 1.1* 1.1* 1.1* 1.0* 1.2*   BILITOTAL 0.6 0.6 0.7 0.9 1.0 0.8   ALKPHOS 81 83 82 94 74 75   AST 14 12 13 14 13 14   ALT 14 14 15 18 18 20     Recent Results (from the past 24 hour(s))   XR Abdomen Port 1 View    Narrative    XR ABDOMEN PORT 1 VW  3/27/2018 2:07 PM      HISTORY: confirm NG tube placement. thanks.;     COMPARISON: Abdominal CT 3/7/2018    FINDINGS:   Portable supine view of the chest. Gastric tube tip and sidehole  projecting above the level of the diaphragm. There is abnormal small  bowel distention throughout the abdomen, bowel loops measuring up to  5.5 cm in diameter. There are opacity silhouetting the left  hemidiaphragm, unchanged from the comparison examination. There is a  partially visualized right pleural effusion. Lucency at the right lung  base/right hemidiaphragm is unusual. Bone findings are unchanged.      Impression    IMPRESSION:   1. Gastric tube tip and sidehole above the diaphragm.  2. Abnormal small bowel dilation concerning for obstruction.  3. Partially visualized right pleural effusion and retrocardiac area  of consolidation. Atypical lucency along the right  hemidiaphragm may  be related to patient rotation, however recommend follow-up abdominal  radiograph to exclude free air.    JOSE REILLY MD   XR Abdomen Port 1 View   Result Value    Radiologist flags Question pneumoperitoneum (Urgent)    Narrative    EXAM: XR ABDOMEN PORT 1 VW  3/27/2018 4:48 PM      HISTORY: re-eval NG tube after advancing;     COMPARISON: 3/27/2018 13:50 hours    FINDINGS: Semiupright AP image of the abdomen. Gastric tube tip has  been slightly advanced and now projects below the gastroesophageal  junction, however the sidehole remains in the esophagus. Unchanged  right chest wall Port-A-Cath device, with tip at the cavoatrial  junction. Unchanged gas dilated loops of bowel. Questionable lucency  under the right hemidiaphragm, although may represent lung fissure  intra-abdominal free air cannot be excluded. No portal venous gas or  pneumobilia. Small bilateral pleural effusions.       Impression    IMPRESSION:   1. Gastric tube tip has been slightly advanced and now projects below  the gastroesophageal junction, however the sidehole remains in the  esophagus. Recommend advancing an additional 8 cm.  2. Unchanged gaseous distention of the small bowel.  3. Lucency along the right hemidiaphragm, suspicious for  pneumoperitoneum. Recommend follow-up lateral decubitus view.  4. Unchanged small bilateral pleural effusions.    [Urgent Result: Question pneumoperitoneum]    Finding was identified on 3/27/2018 5:00 PM.     [facility] was contacted by Dr. Camara  at 3/27/2018 5:15 PM and  verbalized understanding of the urgent finding.     I have personally reviewed the examination and initial interpretation  and I agree with the findings.    BABITA DOE MD   X-ray Abdomen flat port    Narrative    Single view of the abdomen    Comparisons: Same date at 1646    HISTORY: Checking for NG tube placement    FINDINGS: Nasogastric tube has been advanced with the tip and sidehole  in the stomach.  Right-sided Port-A-Cath remains in place. Unchanged  blunting of both costophrenic angles with associated bibasilar  opacities. Dilated loops of small bowel are again noted. Acute  fractures of the right eighth, ninth and 10th ribs.      Impression    IMPRESSION:  1. Nasogastric tube in good position. Unchanged dilated small bowel.  2. Small bilateral pleural effusions with associated bibasilar  atelectasis.  3. Acute appearing fractures of the right eighth, ninth and 10th ribs.    SHARON PAREKH MD   XR Abdomen Port 1 View    Narrative    2 decubitus views of the abdomen    Comparisons: Abdominal plain films same date at 1646 and 1720    HISTORY: Follow-up suspected free intraperitoneal gas.    FINDINGS: There is no free intraperitoneal gas. Multiple dilated loops  of small bowel with air-fluid levels. The nasogastric tube with tip  and sidehole in the stomach.    Possible air-fluid levels in the right hemithorax. The air-fluid level  appears to parallel the air-fluid levels in the small bowel, raising a  suspicion for hydropneumothorax. Acute appearing fractures of the  eighth, ninth and 10th right ribs.      Impression    IMPRESSION:  1. No free intraperitoneal gas.  2. Air-fluid level in the right hemithorax, suspicious for  hydropneumothorax in the setting of acute right rib fractures.  3. Dilated loops of small bowel in the abdomen concerning for small  bowel obstruction.    SHARON PAREKH MD     Anti-infectives (Future)    Start     Dose/Rate Route Frequency Ordered Stop    03/21/18 0830  ciprofloxacin (CIPRO) tablet 500 mg      500 mg Oral EVERY 12 HOURS SCHEDULED 03/21/18 0821 03/30/18 4795          sodium chloride 0.9%  500 mL Intravenous Once     heparin lock flush  5-10 mL Intracatheter Q24H     heparin  5 mL Intracatheter Q28 Days     calcium-vitamin D  1 tablet Oral Daily     multivitamin, therapeutic  1 tablet Oral Daily     zinc sulfate  220 mg Oral Daily     ciprofloxacin  500 mg Oral  Q12H [facility]     sodium chloride (PF)  3 mL Intracatheter Q8H     hydrocortisone   Rectal BID     vitamin B complex with vitamin C  1 tablet Oral Daily     enoxaparin  40 mg Subcutaneous Q24H       IV fluid REPLACEMENT ONLY       - MEDICATION INSTRUCTIONS -

## 2018-03-28 NOTE — PLAN OF CARE
Problem: Patient Care Overview  Goal: Plan of Care/Patient Progress Review  Edema 7D: Recommend continued use of compression wraps to B LEs for management of chronic edema. Skin intact with mild skin discoloration and soft 2+ pitting distally. Reapplication of GCB from MTPs to knee creases, reduced compression used according to patient's tolerance. Remove wraps if causing pain/numbness or become soiled.

## 2018-03-28 NOTE — PROGRESS NOTES
Social Work Services Progress Note    Hospital Day: 9  Date of Initial Social Work Evaluation:  To be completed.  Collaborated with:  Rosa Gonzalez FNP-BC, TCU admissions staff    Data:  SW consulted to meet w/ patient for discharge planning. Per PT/OT recommend TCU at discharge. NG tube placed on 03/27/2018. Per Rosa, anticipate earliest patient will be medically stable for discharge is on Friday 03/30.    Intervention:  THOMAS spoke w/ admissions staff at reviewing TCU referrals to provide updates; no accepting facility as of today.    Per Nicol at [facility], facility has concerns about cost of two medications patient has been receiving during this hospitalization - pyridium and octreotide. Per Nicol, more concerns about cost of pyridium, could possibly accept patient w/ octreotide if patient only needing one time per day. SW reviewed MAR, octreotide currently on hold for patient due to possible small bowel obstruction.    REFERRALS  [facility] - Declined; do not accept patients w/ NG tube  [facility] - Declined; do not accept patients w/ NG tube  [facility] - Declined  [facility] - [facility]. THOMAS left  for Joelle in admissions at 2:00pm today requesting update, awaiting return call.   [facility] - THOMAS spoke / [facility] admissions liaison Nicol (p: [phone]) who will send referral to this facility, does mention that they have not recently taken any patients w/ NG tubes but she will check; concerns re: costs of 2 medications - pyridium and octreotide  [facility] - THOMAS sent referral to [facility]/  admissions, reviewing.    Assessment:  Patient in agreement w/ plan to discharge to TCU when medically stable.    Plan:    Anticipated Disposition:  Facility:  TCU pending placement    Barriers to d/c plan:  NG tube. Medication cost (pyridium and octreotide)    Follow Up:  SW to continue following for discharge planning.    Peg Brunson, MSW, LGSW  7D Oncology SW  Phone  [phone]  Pager [phone]

## 2018-03-28 NOTE — PROGRESS NOTES
CLINICAL NUTRITION SERVICES - BRIEF NOTE    Nutrition progress note - f/u for progress towards previous nutrition POC.    RD has been following pt per TF consult received yesterday. However, TF has not yet been initiated d/t concern for obstruction d/t abnormal small bowel dilation per AXR yesterday.    Notified by Provider this afternoon that pt with evidence of bowel obstruction per CT CAP and hence no plans for TF at this time. Provider agreed to discontinue TF orders. This writer inquired about starting PN since pt has had poor po intakes for the past week, but informed by Provider that pt declining at this time.     No nutrition intervention planned at this time. RD to continue to follow.    Belen Aaron RD,LD  Unit pager [phone]

## 2018-03-29 ENCOUNTER — APPOINTMENT (OUTPATIENT)
Dept: OCCUPATIONAL THERAPY | Facility: CLINIC | Age: 78
DRG: 393 | End: 2018-03-29
Payer: MEDICARE

## 2018-03-29 ENCOUNTER — APPOINTMENT (OUTPATIENT)
Dept: GENERAL RADIOLOGY | Facility: CLINIC | Age: 78
DRG: 393 | End: 2018-03-29
Attending: NURSE PRACTITIONER
Payer: MEDICARE

## 2018-03-29 LAB
ALBUMIN SERPL-MCNC: 1.1 G/DL (ref 3.4–5)
ALP SERPL-CCNC: 83 U/L (ref 40–150)
ALT SERPL W P-5'-P-CCNC: 15 U/L (ref 0–50)
ANION GAP SERPL CALCULATED.3IONS-SCNC: 6 MMOL/L (ref 3–14)
APTT PPP: 32 SEC (ref 22–37)
AST SERPL W P-5'-P-CCNC: 19 U/L (ref 0–45)
BASOPHILS # BLD AUTO: 0 10E9/L (ref 0–0.2)
BASOPHILS NFR BLD AUTO: 0 %
BILIRUB SERPL-MCNC: 0.5 MG/DL (ref 0.2–1.3)
BUN SERPL-MCNC: 7 MG/DL (ref 7–30)
CALCIUM SERPL-MCNC: 7.6 MG/DL (ref 8.5–10.1)
CHLORIDE SERPL-SCNC: 101 MMOL/L (ref 94–109)
CO2 SERPL-SCNC: 30 MMOL/L (ref 20–32)
CREAT SERPL-MCNC: 0.62 MG/DL (ref 0.52–1.04)
DIFFERENTIAL METHOD BLD: ABNORMAL
EOSINOPHIL # BLD AUTO: 0.1 10E9/L (ref 0–0.7)
EOSINOPHIL NFR BLD AUTO: 0.9 %
ERYTHROCYTE [DISTWIDTH] IN BLOOD BY AUTOMATED COUNT: 14.9 % (ref 10–15)
GFR SERPL CREATININE-BSD FRML MDRD: >90 ML/MIN/1.7M2
GLUCOSE BLDC GLUCOMTR-MCNC: 133 MG/DL (ref 70–99)
GLUCOSE BLDC GLUCOMTR-MCNC: 46 MG/DL (ref 70–99)
GLUCOSE BLDC GLUCOMTR-MCNC: 71 MG/DL (ref 70–99)
GLUCOSE BLDC GLUCOMTR-MCNC: 71 MG/DL (ref 70–99)
GLUCOSE BLDC GLUCOMTR-MCNC: 73 MG/DL (ref 70–99)
GLUCOSE BLDC GLUCOMTR-MCNC: 81 MG/DL (ref 70–99)
GLUCOSE BLDC GLUCOMTR-MCNC: 93 MG/DL (ref 70–99)
GLUCOSE SERPL-MCNC: 49 MG/DL (ref 70–99)
HCT VFR BLD AUTO: 34.3 % (ref 35–47)
HGB BLD-MCNC: 11.1 G/DL (ref 11.7–15.7)
INR PPP: 1.36 (ref 0.86–1.14)
INTERPRETATION ECG - MUSE: NORMAL
LYMPHOCYTES # BLD AUTO: 0.1 10E9/L (ref 0.8–5.3)
LYMPHOCYTES NFR BLD AUTO: 0.9 %
MAGNESIUM SERPL-MCNC: 2.1 MG/DL (ref 1.6–2.3)
MCH RBC QN AUTO: 30.3 PG (ref 26.5–33)
MCHC RBC AUTO-ENTMCNC: 32.4 G/DL (ref 31.5–36.5)
MCV RBC AUTO: 94 FL (ref 78–100)
METAMYELOCYTES # BLD: 0.2 10E9/L
METAMYELOCYTES NFR BLD MANUAL: 2.7 %
MONOCYTES # BLD AUTO: 0.1 10E9/L (ref 0–1.3)
MONOCYTES NFR BLD AUTO: 1.8 %
NEUTROPHILS # BLD AUTO: 5.4 10E9/L (ref 1.6–8.3)
NEUTROPHILS NFR BLD AUTO: 91.9 %
PHOSPHATE SERPL-MCNC: 3.2 MG/DL (ref 2.5–4.5)
PLATELET # BLD AUTO: 212 10E9/L (ref 150–450)
PLATELET # BLD EST: ABNORMAL 10*3/UL
POTASSIUM SERPL-SCNC: 3.6 MMOL/L (ref 3.4–5.3)
PROMYELOCYTES # BLD MANUAL: 0.1 10E9/L
PROMYELOCYTES NFR BLD MANUAL: 1.8 %
PROT SERPL-MCNC: 4 G/DL (ref 6.8–8.8)
RBC # BLD AUTO: 3.66 10E12/L (ref 3.8–5.2)
RBC MORPH BLD: NORMAL
SODIUM SERPL-SCNC: 137 MMOL/L (ref 133–144)
TRIGL SERPL-MCNC: 90 MG/DL
WBC # BLD AUTO: 5.9 10E9/L (ref 4–11)

## 2018-03-29 PROCEDURE — 25000128 H RX IP 250 OP 636: Performed by: INTERNAL MEDICINE

## 2018-03-29 PROCEDURE — 12000008 ZZH R&B INTERMEDIATE UMMC

## 2018-03-29 PROCEDURE — 25000128 H RX IP 250 OP 636: Performed by: PHYSICIAN ASSISTANT

## 2018-03-29 PROCEDURE — A9270 NON-COVERED ITEM OR SERVICE: HCPCS | Mod: GY | Performed by: PHYSICIAN ASSISTANT

## 2018-03-29 PROCEDURE — 82962 GLUCOSE BLOOD TEST: CPT

## 2018-03-29 PROCEDURE — 40000275 ZZH STATISTIC RCP TIME EA 10 MIN

## 2018-03-29 PROCEDURE — 84100 ASSAY OF PHOSPHORUS: CPT | Performed by: PHYSICIAN ASSISTANT

## 2018-03-29 PROCEDURE — 3E0436Z INTRODUCTION OF NUTRITIONAL SUBSTANCE INTO CENTRAL VEIN, PERCUTANEOUS APPROACH: ICD-10-PCS | Performed by: INTERNAL MEDICINE

## 2018-03-29 PROCEDURE — 40000133 ZZH STATISTIC OT WARD VISIT: Performed by: OCCUPATIONAL THERAPIST

## 2018-03-29 PROCEDURE — 93005 ELECTROCARDIOGRAM TRACING: CPT

## 2018-03-29 PROCEDURE — A9270 NON-COVERED ITEM OR SERVICE: HCPCS | Mod: GY | Performed by: INTERNAL MEDICINE

## 2018-03-29 PROCEDURE — 25000132 ZZH RX MED GY IP 250 OP 250 PS 637: Mod: GY | Performed by: PHYSICIAN ASSISTANT

## 2018-03-29 PROCEDURE — 80053 COMPREHEN METABOLIC PANEL: CPT | Performed by: PHYSICIAN ASSISTANT

## 2018-03-29 PROCEDURE — 85025 COMPLETE CBC W/AUTO DIFF WBC: CPT | Performed by: PHYSICIAN ASSISTANT

## 2018-03-29 PROCEDURE — 36592 COLLECT BLOOD FROM PICC: CPT | Performed by: PHYSICIAN ASSISTANT

## 2018-03-29 PROCEDURE — 25000125 ZZHC RX 250: Performed by: INTERNAL MEDICINE

## 2018-03-29 PROCEDURE — 85730 THROMBOPLASTIN TIME PARTIAL: CPT | Performed by: PHYSICIAN ASSISTANT

## 2018-03-29 PROCEDURE — A9270 NON-COVERED ITEM OR SERVICE: HCPCS | Mod: GY | Performed by: NURSE PRACTITIONER

## 2018-03-29 PROCEDURE — 99232 SBSQ HOSP IP/OBS MODERATE 35: CPT | Performed by: INTERNAL MEDICINE

## 2018-03-29 PROCEDURE — 97140 MANUAL THERAPY 1/> REGIONS: CPT | Mod: GO | Performed by: OCCUPATIONAL THERAPIST

## 2018-03-29 PROCEDURE — 83735 ASSAY OF MAGNESIUM: CPT | Performed by: PHYSICIAN ASSISTANT

## 2018-03-29 PROCEDURE — 85610 PROTHROMBIN TIME: CPT | Performed by: PHYSICIAN ASSISTANT

## 2018-03-29 PROCEDURE — 71045 X-RAY EXAM CHEST 1 VIEW: CPT

## 2018-03-29 PROCEDURE — 25800025 ZZH RX 258: Performed by: INTERNAL MEDICINE

## 2018-03-29 PROCEDURE — 93010 ELECTROCARDIOGRAM REPORT: CPT | Performed by: INTERNAL MEDICINE

## 2018-03-29 PROCEDURE — 84478 ASSAY OF TRIGLYCERIDES: CPT | Performed by: PHYSICIAN ASSISTANT

## 2018-03-29 PROCEDURE — 25800025 ZZH RX 258: Performed by: NURSE PRACTITIONER

## 2018-03-29 PROCEDURE — 25000132 ZZH RX MED GY IP 250 OP 250 PS 637: Mod: GY | Performed by: INTERNAL MEDICINE

## 2018-03-29 PROCEDURE — 25000128 H RX IP 250 OP 636: Performed by: NURSE PRACTITIONER

## 2018-03-29 PROCEDURE — G0463 HOSPITAL OUTPT CLINIC VISIT: HCPCS

## 2018-03-29 PROCEDURE — 25000132 ZZH RX MED GY IP 250 OP 250 PS 637: Mod: GY | Performed by: NURSE PRACTITIONER

## 2018-03-29 RX ORDER — DEXTROSE MONOHYDRATE 25 G/50ML
25 INJECTION, SOLUTION INTRAVENOUS ONCE
Status: COMPLETED | OUTPATIENT
Start: 2018-03-29 | End: 2018-03-29

## 2018-03-29 RX ADMIN — DEXTROSE MONOHYDRATE 250 ML: 100 INJECTION, SOLUTION INTRAVENOUS at 14:32

## 2018-03-29 RX ADMIN — POTASSIUM CHLORIDE 20 MEQ: 29.8 INJECTION, SOLUTION INTRAVENOUS at 19:22

## 2018-03-29 RX ADMIN — ACETAMINOPHEN 650 MG: 325 TABLET, FILM COATED ORAL at 08:00

## 2018-03-29 RX ADMIN — I.V. FAT EMULSION 250 ML: 20 EMULSION INTRAVENOUS at 21:05

## 2018-03-29 RX ADMIN — CIPROFLOXACIN HYDROCHLORIDE 500 MG: 500 TABLET, FILM COATED ORAL at 08:00

## 2018-03-29 RX ADMIN — Medication 1 TABLET: at 17:55

## 2018-03-29 RX ADMIN — TRAMADOL HYDROCHLORIDE 50 MG: 50 TABLET, COATED ORAL at 08:00

## 2018-03-29 RX ADMIN — SODIUM CHLORIDE, PRESERVATIVE FREE 5 ML: 5 INJECTION INTRAVENOUS at 07:53

## 2018-03-29 RX ADMIN — DEXTROSE MONOHYDRATE 250 ML: 100 INJECTION, SOLUTION INTRAVENOUS at 09:25

## 2018-03-29 RX ADMIN — CIPROFLOXACIN HYDROCHLORIDE 500 MG: 500 TABLET, FILM COATED ORAL at 21:04

## 2018-03-29 RX ADMIN — DIPHENHYDRAMINE HYDROCHLORIDE AND LIDOCAINE HYDROCHLORIDE AND ALUMINUM HYDROXIDE AND MAGNESIUM HYDRO 10 ML: KIT at 10:53

## 2018-03-29 RX ADMIN — POTASSIUM CHLORIDE: 2 INJECTION, SOLUTION, CONCENTRATE INTRAVENOUS at 21:05

## 2018-03-29 RX ADMIN — HYDROCORTISONE: 25 CREAM TOPICAL at 21:06

## 2018-03-29 RX ADMIN — DEXTROSE MONOHYDRATE 25 ML: 500 INJECTION PARENTERAL at 07:40

## 2018-03-29 RX ADMIN — ACETAMINOPHEN 650 MG: 325 TABLET, FILM COATED ORAL at 16:48

## 2018-03-29 RX ADMIN — HYDROCORTISONE: 25 CREAM TOPICAL at 07:57

## 2018-03-29 RX ADMIN — ENOXAPARIN SODIUM 40 MG: 40 INJECTION SUBCUTANEOUS at 21:05

## 2018-03-29 ASSESSMENT — PAIN DESCRIPTION - DESCRIPTORS
DESCRIPTORS: TENDER
DESCRIPTORS: ACHING
DESCRIPTORS: TENDER
DESCRIPTORS: ACHING
DESCRIPTORS: ACHING

## 2018-03-29 NOTE — PLAN OF CARE
Problem: Patient Care Overview  Goal: Plan of Care/Patient Progress Review  Outcome: No Change  [id number]: Orthostatic c/o feeling dizzy upon standing, OVSS. C/O of perineal pain, relieved with tylenol x 1 and tramadol x 1. C/O of nausea, offered aromatherapy, found helpful. Worked with PT today. Up to commode x 1, BM x 1. Received 40mg lasix and 1L bolus. Received magnesium and phosphorus, recheck in AM. TF held for now d/t bowel obstruction. Continue with POC.

## 2018-03-29 NOTE — PLAN OF CARE
Problem: Patient Care Overview  Goal: Plan of Care/Patient Progress Review  OT 7D: Attempt x 2 this date but patient with other providers on both occasions.

## 2018-03-29 NOTE — PLAN OF CARE
Problem: Patient Care Overview  Goal: Plan of Care/Patient Progress Review  Alert and oriented X 3 with occasional forgetfulness. C/o intermittent chest pressure and dyspnea. Reports difficulty taking deep breath. Denies nausea, dizziness or palpitations. AVSS. SpO2 93% on RA. Sinus rhythm with PACs per ECG. Physician updated. Pt has had two large watery stools. Skin denuded in thighs, perineum and buttocks. Cleansed gently and barrier ointment applied after each incontinence episode. Turned and repositioned with assist of 1. Sleeping in between cares.

## 2018-03-29 NOTE — PLAN OF CARE
Problem: Patient Care Overview  Goal: Plan of Care/Patient Progress Review  Patient refusing PT upon arrival this PM, reporting having just woken from a nap and wishes to continue resting. PT unable to check back in later PM.

## 2018-03-29 NOTE — PROGRESS NOTES
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     Hospital complications:    #UTI (Klebsiella pneumoniae & proteus mirabilis)  -Continue Cipro (x3/21) 500mg BID, plan for 10 days of therapy (x3/21-3/30).  -Repeat UA continues to be dirty with hyaline casts, didn't reflex to culture.    #Stage IIIA (T2 N1) SCC of anus  -Followed by Dr. Ray  -S/p 1 cycle 5-FU + mitomycin (D1 2/12) followed by 1 cycle single-agent 5-FU (d/t toxicity and age, D29 3/11) with concurrent radiation  -Completed radiation 3/23  -Supportive rectal cares ordered  -Intermittently expressing feelings of hopelessness and feeling at peace with dying, not wanting to continue with treatment. Consulted palliative care SW for assistance with illness coping and decision making, appreciate their assistance. Feeling more hopeful now that she has completed XRT, will continue supportive cares, she has f/u appt with Dr. Ray in ~2 weeks.    #Hypoxemia  #Hydropneumothorax, right side, likely secondary to fractured ribs  #Hydrothorax, left side  recurrence, now on 2-3 L NC likely 2/2 to volume overload/pulmonary edema & b/l Pleural effusions  Developed overnight 3/23-3/24. Hypoalbuminemic with signs of third spacing. Suspect secondary to fluid overload. S/P farias placement. CXR 3/24 10 am with small bilateral layering pleural effusions with overlying atelectasis/consolidation.   - lasix 20 mg BID on 3/24-25, now d/c as dizzy with ambulation & respiratory status improved.  - Recurrence of hypoxia 3/27; CT of chest confirms hydropneumothorax of right lung likely secondary to rib fractures.  Pneumo is small and likely thoracentesis/chest tube would not address this completely, and due to albumin and  fluid status, pleural effusions would likely re accumulate quickly. Monitor for now.   - CXR today- Trace right apical pneumothorax. Moderate layering bilateral pleural effusions with bilateral lower lobes and left retrocardiac opacities, atelectasis versus  Consolidation.  - Oxygen demands stable today, did have some intermittent CP overnight 3/27, but EKG WNL. Monitor.      Malignancy/Treatment related (chemotherapy/XRT) complications:    #Intractable diarrhea  #Nausea  #Dehydration  #Electrolyte derangements  #Small bowel obstruction  -Suspect nausea, diarrhea secondary to mucositis from recent chemotherapy (5-FU) and XRT. C diff & enteric panel were negative.   - AXR 3/27 for NG tube confirmation noted concern for possible SBO; CT CAP 3/28 shows SBO; Continue to hold tube feeds, TPN instead.   - Previously scheduled Lomotil and octreotide 100mg TID SQ while inpatient, will see if this can be continued at TCU. Imodium available PRN. All antidiarrheals are on hold given concern for SBO.  - Likely her anal sphincter tone is compromised, and incontinence will continue to be an issue.    -Added tincture of opium, which seemed to be helping until overnight 3/23-3/24. Holding tincture of opium at this time due to confusion (see below).  -Antiemetics also available (Zofran, Compazine). No vomiting since admission.  -High lyte SS.  - Clear liquid diet, as tolerated    #Perianal wound, secondary to radiation  - Pain controlled with tylenol and tramadol PRN   - Appreciate [facility] assistance in management of this wound  - Plan for TPN given concern for compromised wound healing after XRT with malnutrition    #Pancytopenia  -Secondary to underlying disease, recent chemo/XRT  -Transfuse for Hgb <8, PLT <10K - no transfusion needs thus far    #Weakness  #Fall at home  #Fractured R 8th, 9th and 10th rib  -Suspect weakness, falls secondary to above issues.  -Fall precautions  -PT/OT --> encouraged patient to work with them, she is  very motivated but struggling with pain and weakness.   -Also has Tylenol, ketamine/gabapentin/lidocaine and diclofenac available topically, which she finds helpful, so will continue.    #Malnutrition, in context of acute on chronic illness  - % Intake: </= 50% for >/= 5 days (severe); Subcutaneous Fat Loss: Facial region: Moderate, Upper arm and Thoracic/intercostal: Severe   - Calorie counts started 3/22, minimal intake, not meeting nutritional needs  - Suspect lack of good PO intake is contributing to impaired wound healing   - NG tube placement 3/27 with plan for tube feedings, however found to have SBO.   - Start TPN tonight. Labs Qdaily.     #Coagulopathy r/t severe malnutrition & infection (UTI)  -Suspect secondary to malnutrition. Dosing daily with vitamin K PRN.  -Got PO vitamin K 5 mg (3/19, 3/20, 3/21, 2x on 3/22, 3/23 and 3/27 (for INR of 1.87) none since  -INR 1/44 today   - Hopefully starting nutrition will help with this.     #Altered mental status >> improved/resolved likely r/t MEDS 3/25 AM  -Nonsensical statements this morning, disoriented. 3/24 AM.  -Was getting concurrent oxycodone/opium tincture. Will now hold.  -Hypoxia 2/2 to volume overload.  -Blood culture: NTD  -Urine culture: 3/19 with klebsiella PNA on ciprofloxacin  -CXR with pleural effusions.     PAIN # Pain Assessment:  Current Pain Score 3/29/2018   Patient currently in pain? yes   Pain score (0-10) -   Pain location Shoulder   Pain descriptors Aching   - Elizabeth is experiencing pain due to fractured ribs, radiation treatment. Pain management was discussed and the plan was created in a collaborative fashion.  Elizabeth's response to the current recommendations: engaged  compliant  - Opioid regimen: tramadol and tylenol PRN (oxycodone and opiod tincture caused ANS)   - Pharmacologic adjuvants: topical diclofenac gel, topical ketamine/gabapentin/lidocaine cream   FEN - Fluid bolus PRN  - Start TPN (3/29)  - continue farias placement   PPX  "(VTE & GI) - lovenox SQ 40 mg daily (plt 114)  - Bowel regimen: not needed- having diarrhea   LINES & DRAINS & WOUNDS - Port  - Joiner  - Wound on sacrum   CONSULTS - OT, PT, WOC, nutrition   CODE - FULL   DISPO - Anticipate d/c to TCU in the next 2-3 days pending results of further imaging, control of pain, diarrhea, hypoxemia, and nutritional status.     Interval history  Met with patient, She reports \"everything feels better today!\" She reports her nausea was bothersome yesterday, but is improved today. She feels less bloated today.  She finds her pain is well controlled.  She does continue to have significant incontinence of stool. She is positive and upbeat today. She is agreeable to TPN today. Joiner catheter is helping with incontinence & irritation to wound. Has pain at wound site & on ribs, worse with deep breaths and coughing.       Above plan discussed with staff physician, Dr. Argueta.     Rosa Gonzalez, [facility]-BC  Hematology/Oncology  #[id number]        Physical Exam  Constitutional: Awake, alert, cooperative, in NAD. Elderly/frail and cachectic.   Eyes: PERRL, EOMI, sclera clear, conjunctiva normal.  ENT: Normocephalic, without obvious abnormality, moist mucus membranes, no lesions or thrush. NG tube in place.   Respiratory: Non-labored breathing, absent in bases, no wheezing, no rales.   Cardiovascular: RRR, no murmur noted.  GI: +BS, soft, non-distended, mild diffuse tenderness, improved from yesteday.  Skin: No concerning lesions or rash on exposed areas.  Musculoskeletal: 1-2+ pitting edema to BL LE, wrapped.   Neurologic: Awake, A&O x 3. Cranial nerves II-XII are grossly intact.   Neuropsychiatric: Calm, normal affect, not confused, memory intact, judgement/insight intact.     Rounding:  Temp:  [95.1  F (35.1  C)-99.1  F (37.3  C)] 99.1  F (37.3  C)  Pulse:  [85-92] 92  Heart Rate:  [80-90] 90  Resp:  [18] 18  BP: ()/(46-69) 102/69  SpO2:  [88 %-95 %] 95 %    I/O last 3 completed shifts:  In: 1820 " [I.V.:320; IV Piggyback:1500]  Out: 1975 [Urine:1975]    Vitals:    03/20/18 0744 03/21/18 2030 03/23/18 1652 03/29/18 0900   Weight: 57.6 kg (126 lb 14.4 oz) 60.5 kg (133 lb 4.8 oz) 62.9 kg (138 lb 11.2 oz) 58.4 kg (128 lb 12.8 oz)       Recent Labs  Lab 03/29/18  0633 03/28/18  0556 03/27/18  0624 03/26/18  0615   WBC 5.9 6.0 4.3 4.4   RBC 3.66* 3.59* 3.57* 3.49*   HGB 11.1* 10.9* 10.8* 10.6*   HCT 34.3* 34.1* 33.5* 32.7*   MCV 94 95 94 94   MCH 30.3 30.4 30.3 30.4   MCHC 32.4 32.0 32.2 32.4   RDW 14.9 15.0 14.9 15.0    197 165 135*       Recent Labs  Lab 03/29/18  0633 03/28/18  0556 03/27/18  0624 03/26/18  0615  03/24/18  0603    136 135 136  < > 136   POTASSIUM 3.6 4.2 4.4 3.8  < > 4.2   CHLORIDE 101 102 101 101  < > 105   CO2 30 28 30 27  < > 24   ANIONGAP 6 6 4 8  < > 7   GLC 49* 71 88 78  < > 86   BUN 7 8 8 7  < > 6*   CR 0.62 0.59 0.58 0.57  < > 0.70   GFRESTIMATED >90 >90 >90 >90  < > 81   GFRESTBLACK >90 >90 >90 >90  < > >90   ELISE 7.6* 7.5* 7.6* 7.4*  < > 7.2*   MAG 2.1 2.0 2.0 2.0  < > 1.8   PHOS 3.2 2.6 3.7  --   --  2.8   PROTTOTAL 4.0* 4.0* 4.1* 3.9*  < > 3.6*   ALBUMIN 1.1* 1.0* 1.1* 1.1*  < > 1.0*   BILITOTAL 0.5 0.6 0.6 0.7  < > 1.0   ALKPHOS 83 81 83 82  < > 74   AST 19 14 12 13  < > 13   ALT 15 14 14 15  < > 18   < > = values in this interval not displayed.  No results found for this or any previous visit (from the past 24 hour(s)).  Anti-infectives (Future)    Start     Dose/Rate Route Frequency Ordered Stop    03/21/18 0830  ciprofloxacin (CIPRO) tablet 500 mg      500 mg Oral EVERY 12 HOURS SCHEDULED 03/21/18 0821 03/30/18 5744          dextrose 10%  500 mL Intravenous Once     heparin lock flush  5-10 mL Intracatheter Q24H     heparin  5 mL Intracatheter Q28 Days     calcium-vitamin D  1 tablet Oral Daily     multivitamin, therapeutic  1 tablet Oral Daily     zinc sulfate  220 mg Oral Daily     ciprofloxacin  500 mg Oral Q12H OSCAR     hydrocortisone   Rectal BID     vitamin B  complex with vitamin C  1 tablet Oral Daily     enoxaparin  40 mg Subcutaneous Q24H       - MEDICATION INSTRUCTIONS -

## 2018-03-29 NOTE — PROGRESS NOTES
CLINICAL NUTRITION SERVICES - REASSESSMENT NOTE     Nutrition Prescription    RECOMMENDATIONS FOR MDs/PROVIDERS TO ORDER:  None    Malnutrition Status:    Severe malnutrition in the context of acute on chronic illness      Recommendations already ordered by Registered Dietitian (RD):  - Order lab add on to check TG  - Entered TPN change order to initiate CPN, goal volume 960 ml/day with 100 g Dex daily (340 kcal), 80 g AA daily (320 kcal) and 250 ml of 20% IV lipids x 5 days per week = 1017 kcals/day (20 kcal/kg/day), 1.6 g PRO/kg/day, GIR 1.4 with 35 % kcals from Fat.  Goal dex is 240 g (816 kcals) to meet 30 kcals/kg.    Future/Additional Recommendations:  None at this time.     EVALUATION OF THE PROGRESS TOWARD GOALS   Diet: Previously on Regular diet, but discontinued on 3/28 d/t evidence of SBO (per CT CAP on 3/28)  - CLs just ordered this afternoon.    Intake: Remains Minimal       Nutrition Support: TF ordered on 3/27, but TF did not start d/t concern for bowel obstruction (as outlined above) and TF order was discontinued yesterday.     NEW FINDINGS   Provider consult received for Pharmacy/Nutrition to start & manage PN d/t inability to tolerate enteral nutrition secondary to bowel obstruction and intractable diarrhea.    Weight: 58.4 kg (today), 62.9 kg (3/28), 57.6 kg (3/20); Wt continues to fluctuate d/t shifts in fluid status. Suspect wt remains falsely elevated d/t fluid (pt with pronounced ascites per chart review) and possible distention d/t bowel obstruction.   - Will continue to use previous dosing wt of 50 kg (noted on 3/2)    Labs:   K+, Mg and PO4 WNL, but pt at risk for depletion d/t risk for refeeding with initiation of PN in add  No TG checked yet this admission    MALNUTRITION  % Intake: </= 50% for >/= 5 days (severe) - (Ongoing)  % Weight Loss: Unable to assess d/t pt is fluid wt up - (Ongoing)  Subcutaneous Fat Loss: Facial region: Moderate, Upper arm and Thoracic/intercostal: Severe -  (Ongoing)  Muscle Loss: Temporal and Facial & jaw region: Moderate, Scapular bone,, Thoracic region (clavicle, acromium bone, deltoid, trapezius, pectoral) and Upper arm (bicep, tricep): Severe - (Ongoing)  Fluid Accumulation/Edema: Unable to assess d/t lower legs wrapped. Per chart review, Moderate (Ongoing)  Malnutrition Diagnosis: Severe malnutrition in the context of acute on chronic illness    Previous Goals   Total avg nutritional intake to meet a minimum of 30 kcal/kg and 1.2 g PRO/kg daily (per dosing wt 50 kg)    Evaluation: Not met    Previous Nutrition Diagnosis  Inadequate protein-energy intake related to mouth and stomach pain hinder po and concern for altered GI function d/t diarrhea hindering absorption and TF therapy ordered, but not yet initiated as evidenced by ave po intakes per calorie counts meting < 25% of estimated needs, evidence of subcutaneous fat and muscle wasting and no TF intakes received yet.     Evaluation: No change    CURRENT NUTRITION DIAGNOSIS  Inadequate protein-energy intake related to altered GI function secondary to SBO inhibiting po and enteral feedings and PN therapy ordered, but not yet initiated as evidenced by minimal nutritional intakes over past week and no PN intakes received yet.      INTERVENTIONS  Implementation  Collaboration with Providers regarding PN order and request received to limit vol d/t fluid issues. Provider agreeable to PN vol of 960 ml.  Nutrition education for recommended modifications - attempted to visit with pt to provide education on plan for PN, but pt resting.  Parenteral Nutrition/IV Fluids - Initiate    Goals  Total avg nutritional intake to meet a minimum of 30 kcal/kg and 1.2 g PRO/kg daily (per dosing wt 50 kg).    Monitoring/Evaluation  Progress toward goals will be monitored and evaluated per protocol.      Belen Aaron RD,LD  Unit Pager [phone]

## 2018-03-29 NOTE — PLAN OF CARE
Problem: Patient Care Overview  Goal: Plan of Care/Patient Progress Review  Edema 7D: Recommend continued use of compression wraps to manage chronic LE edema. Skin remains intact with 1+/2+ pitting throughout. Reapplication of light GCB from MTPs to knee creases, gentle compression used according to patient's tolerance. Remove wraps if causing pain/numbness or become soiled.

## 2018-03-29 NOTE — SIGNIFICANT EVENT
xcover note    I briefly came to see this patient for intermittent chest pressure that she was experiencing off and on all night. By the time I saw the patient, she was laying on her side and reported that she was not having chest pressure at the moment. Pt reported feeling comfortable. NG tube in place. 12 lead EKG NSR w/ PACs. CT chest/abdomen pelvis demontrated right hydropneumothorax with left hydrothorax. Pt also has pronounced ascites. I suspect the patient's chest pressure is multifactorial from ascites and effusions. Not concerned for ACS. May consider repeating EKG with cardiac troponin if chest pressure resolves.     Dmitriy Braden  [facility]  [phone]
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
## 2018-03-29 NOTE — PLAN OF CARE
Problem: Diarrhea (Adult)  Goal: Identify Related Risk Factors and Signs and Symptoms  Related risk factors and signs and symptoms are identified upon initiation of Human [employer] Clinical Practice Guideline (CPG).   Outcome: No Change  Continues with diarrhea from radiation therapy. Aquaphor applied to skin and turned every 2 hours. Waiting for Proshield cream  to come in on Monday. NG pulled and she can have a clear liquid diet. She will started TPN tonight. Still needs 20 meq of potassium when D10 bolus is done. Blood glucose from labs was 46 so was given Dextrose 50% 25 ml's and recheck was 71 so Rosa Nguyen NP notified and ordered D10 at 50 cc's an hour which goes until 1930. Tylenol and Tramadol for shoulder pain with some relief. Please encourage her to get out of bed.

## 2018-03-30 ENCOUNTER — APPOINTMENT (OUTPATIENT)
Dept: OCCUPATIONAL THERAPY | Facility: CLINIC | Age: 78
DRG: 393 | End: 2018-03-30
Payer: MEDICARE

## 2018-03-30 ENCOUNTER — APPOINTMENT (OUTPATIENT)
Dept: PHYSICAL THERAPY | Facility: CLINIC | Age: 78
DRG: 393 | End: 2018-03-30
Payer: MEDICARE

## 2018-03-30 LAB
ALBUMIN SERPL-MCNC: 0.9 G/DL (ref 3.4–5)
ALP SERPL-CCNC: 75 U/L (ref 40–150)
ALT SERPL W P-5'-P-CCNC: 14 U/L (ref 0–50)
ANION GAP SERPL CALCULATED.3IONS-SCNC: 5 MMOL/L (ref 3–14)
APTT PPP: 35 SEC (ref 22–37)
AST SERPL W P-5'-P-CCNC: 22 U/L (ref 0–45)
BACTERIA SPEC CULT: NO GROWTH
BACTERIA SPEC CULT: NO GROWTH
BASOPHILS # BLD AUTO: 0 10E9/L (ref 0–0.2)
BASOPHILS NFR BLD AUTO: 0 %
BILIRUB SERPL-MCNC: 0.8 MG/DL (ref 0.2–1.3)
BUN SERPL-MCNC: 9 MG/DL (ref 7–30)
CALCIUM SERPL-MCNC: 7.7 MG/DL (ref 8.5–10.1)
CHLORIDE SERPL-SCNC: 102 MMOL/L (ref 94–109)
CO2 SERPL-SCNC: 30 MMOL/L (ref 20–32)
CREAT SERPL-MCNC: 0.52 MG/DL (ref 0.52–1.04)
DIFFERENTIAL METHOD BLD: ABNORMAL
EOSINOPHIL # BLD AUTO: 0 10E9/L (ref 0–0.7)
EOSINOPHIL NFR BLD AUTO: 0 %
ERYTHROCYTE [DISTWIDTH] IN BLOOD BY AUTOMATED COUNT: 15 % (ref 10–15)
GFR SERPL CREATININE-BSD FRML MDRD: >90 ML/MIN/1.7M2
GLUCOSE BLDC GLUCOMTR-MCNC: 127 MG/DL (ref 70–99)
GLUCOSE BLDC GLUCOMTR-MCNC: 131 MG/DL (ref 70–99)
GLUCOSE BLDC GLUCOMTR-MCNC: 147 MG/DL (ref 70–99)
GLUCOSE SERPL-MCNC: 118 MG/DL (ref 70–99)
HCT VFR BLD AUTO: 31.8 % (ref 35–47)
HGB BLD-MCNC: 10.3 G/DL (ref 11.7–15.7)
INR PPP: 1.28 (ref 0.86–1.14)
LYMPHOCYTES # BLD AUTO: 0.1 10E9/L (ref 0.8–5.3)
LYMPHOCYTES NFR BLD AUTO: 0.9 %
MAGNESIUM SERPL-MCNC: 1.9 MG/DL (ref 1.6–2.3)
MCH RBC QN AUTO: 30 PG (ref 26.5–33)
MCHC RBC AUTO-ENTMCNC: 32.4 G/DL (ref 31.5–36.5)
MCV RBC AUTO: 93 FL (ref 78–100)
MONOCYTES # BLD AUTO: 0.1 10E9/L (ref 0–1.3)
MONOCYTES NFR BLD AUTO: 1.8 %
NEUTROPHILS # BLD AUTO: 5.4 10E9/L (ref 1.6–8.3)
NEUTROPHILS NFR BLD AUTO: 97.3 %
NRBC # BLD AUTO: 0.1 10*3/UL
NRBC BLD AUTO-RTO: 2 /100
OVALOCYTES BLD QL SMEAR: SLIGHT
PHOSPHATE SERPL-MCNC: 2 MG/DL (ref 2.5–4.5)
PLATELET # BLD AUTO: 222 10E9/L (ref 150–450)
PLATELET # BLD EST: ABNORMAL 10*3/UL
POIKILOCYTOSIS BLD QL SMEAR: SLIGHT
POTASSIUM SERPL-SCNC: 3.7 MMOL/L (ref 3.4–5.3)
PREALB SERPL IA-MCNC: 3 MG/DL (ref 15–45)
PROT SERPL-MCNC: 3.8 G/DL (ref 6.8–8.8)
RBC # BLD AUTO: 3.43 10E12/L (ref 3.8–5.2)
SODIUM SERPL-SCNC: 137 MMOL/L (ref 133–144)
SPECIMEN SOURCE: NORMAL
SPECIMEN SOURCE: NORMAL
WBC # BLD AUTO: 5.6 10E9/L (ref 4–11)

## 2018-03-30 PROCEDURE — 25000128 H RX IP 250 OP 636: Performed by: INTERNAL MEDICINE

## 2018-03-30 PROCEDURE — 97110 THERAPEUTIC EXERCISES: CPT | Mod: GP

## 2018-03-30 PROCEDURE — 80053 COMPREHEN METABOLIC PANEL: CPT | Performed by: PHYSICIAN ASSISTANT

## 2018-03-30 PROCEDURE — 82962 GLUCOSE BLOOD TEST: CPT

## 2018-03-30 PROCEDURE — 84134 ASSAY OF PREALBUMIN: CPT | Performed by: PHYSICIAN ASSISTANT

## 2018-03-30 PROCEDURE — 36592 COLLECT BLOOD FROM PICC: CPT | Performed by: PHYSICIAN ASSISTANT

## 2018-03-30 PROCEDURE — 25000132 ZZH RX MED GY IP 250 OP 250 PS 637: Mod: GY | Performed by: PHYSICIAN ASSISTANT

## 2018-03-30 PROCEDURE — 12000008 ZZH R&B INTERMEDIATE UMMC

## 2018-03-30 PROCEDURE — A9270 NON-COVERED ITEM OR SERVICE: HCPCS | Mod: GY | Performed by: NURSE PRACTITIONER

## 2018-03-30 PROCEDURE — A9270 NON-COVERED ITEM OR SERVICE: HCPCS | Mod: GY | Performed by: INTERNAL MEDICINE

## 2018-03-30 PROCEDURE — 85025 COMPLETE CBC W/AUTO DIFF WBC: CPT | Performed by: PHYSICIAN ASSISTANT

## 2018-03-30 PROCEDURE — 83735 ASSAY OF MAGNESIUM: CPT | Performed by: PHYSICIAN ASSISTANT

## 2018-03-30 PROCEDURE — 25000125 ZZHC RX 250: Performed by: INTERNAL MEDICINE

## 2018-03-30 PROCEDURE — 25000125 ZZHC RX 250: Performed by: PHYSICIAN ASSISTANT

## 2018-03-30 PROCEDURE — 40000133 ZZH STATISTIC OT WARD VISIT

## 2018-03-30 PROCEDURE — 84630 ASSAY OF ZINC: CPT | Performed by: PHYSICIAN ASSISTANT

## 2018-03-30 PROCEDURE — 97140 MANUAL THERAPY 1/> REGIONS: CPT | Mod: GO | Performed by: OCCUPATIONAL THERAPIST

## 2018-03-30 PROCEDURE — 97530 THERAPEUTIC ACTIVITIES: CPT | Mod: GO

## 2018-03-30 PROCEDURE — 40000193 ZZH STATISTIC PT WARD VISIT

## 2018-03-30 PROCEDURE — 85730 THROMBOPLASTIN TIME PARTIAL: CPT | Performed by: PHYSICIAN ASSISTANT

## 2018-03-30 PROCEDURE — 40000133 ZZH STATISTIC OT WARD VISIT: Performed by: OCCUPATIONAL THERAPIST

## 2018-03-30 PROCEDURE — 99232 SBSQ HOSP IP/OBS MODERATE 35: CPT | Performed by: INTERNAL MEDICINE

## 2018-03-30 PROCEDURE — 85610 PROTHROMBIN TIME: CPT | Performed by: PHYSICIAN ASSISTANT

## 2018-03-30 PROCEDURE — 25000132 ZZH RX MED GY IP 250 OP 250 PS 637: Mod: GY | Performed by: NURSE PRACTITIONER

## 2018-03-30 PROCEDURE — A9270 NON-COVERED ITEM OR SERVICE: HCPCS | Mod: GY | Performed by: PHYSICIAN ASSISTANT

## 2018-03-30 PROCEDURE — 97535 SELF CARE MNGMENT TRAINING: CPT | Mod: GO | Performed by: OCCUPATIONAL THERAPIST

## 2018-03-30 PROCEDURE — 25000132 ZZH RX MED GY IP 250 OP 250 PS 637: Mod: GY | Performed by: INTERNAL MEDICINE

## 2018-03-30 PROCEDURE — 40000141 ZZH STATISTIC PERIPHERAL IV START W/O US GUIDANCE

## 2018-03-30 PROCEDURE — 25000128 H RX IP 250 OP 636: Performed by: PHYSICIAN ASSISTANT

## 2018-03-30 PROCEDURE — 84100 ASSAY OF PHOSPHORUS: CPT | Performed by: PHYSICIAN ASSISTANT

## 2018-03-30 RX ADMIN — ENOXAPARIN SODIUM 40 MG: 40 INJECTION SUBCUTANEOUS at 19:48

## 2018-03-30 RX ADMIN — POTASSIUM CHLORIDE: 2 INJECTION, SOLUTION, CONCENTRATE INTRAVENOUS at 19:47

## 2018-03-30 RX ADMIN — Medication 10 MEQ: at 14:24

## 2018-03-30 RX ADMIN — Medication 2 G: at 10:14

## 2018-03-30 RX ADMIN — I.V. FAT EMULSION 250 ML: 20 EMULSION INTRAVENOUS at 19:47

## 2018-03-30 RX ADMIN — TRAMADOL HYDROCHLORIDE 50 MG: 50 TABLET, COATED ORAL at 02:02

## 2018-03-30 RX ADMIN — DIPHENHYDRAMINE HYDROCHLORIDE AND LIDOCAINE HYDROCHLORIDE AND ALUMINUM HYDROXIDE AND MAGNESIUM HYDRO 5 ML: KIT at 21:58

## 2018-03-30 RX ADMIN — HYDROCORTISONE: 25 CREAM TOPICAL at 19:49

## 2018-03-30 RX ADMIN — DIPHENHYDRAMINE HYDROCHLORIDE AND LIDOCAINE HYDROCHLORIDE AND ALUMINUM HYDROXIDE AND MAGNESIUM HYDRO 5 ML: KIT at 10:15

## 2018-03-30 RX ADMIN — Medication 10 MEQ: at 12:45

## 2018-03-30 RX ADMIN — CIPROFLOXACIN HYDROCHLORIDE 500 MG: 500 TABLET, FILM COATED ORAL at 19:48

## 2018-03-30 RX ADMIN — ACETAMINOPHEN 650 MG: 325 TABLET, FILM COATED ORAL at 07:02

## 2018-03-30 RX ADMIN — ACETAMINOPHEN 650 MG: 325 TABLET, FILM COATED ORAL at 02:42

## 2018-03-30 RX ADMIN — TRAMADOL HYDROCHLORIDE 50 MG: 50 TABLET, COATED ORAL at 19:53

## 2018-03-30 RX ADMIN — CIPROFLOXACIN HYDROCHLORIDE 500 MG: 500 TABLET, FILM COATED ORAL at 10:14

## 2018-03-30 RX ADMIN — POTASSIUM PHOSPHATE, MONOBASIC AND POTASSIUM PHOSPHATE, DIBASIC 15 MMOL: 224; 236 INJECTION, SOLUTION INTRAVENOUS at 15:50

## 2018-03-30 RX ADMIN — ACETAMINOPHEN 650 MG: 325 TABLET, FILM COATED ORAL at 19:53

## 2018-03-30 RX ADMIN — HYDROCORTISONE: 25 CREAM TOPICAL at 10:13

## 2018-03-30 ASSESSMENT — PAIN DESCRIPTION - DESCRIPTORS
DESCRIPTORS: PRESSURE

## 2018-03-30 NOTE — PROGRESS NOTES
Spoke with pt reviewing her schedule for Friday, 3/2/18.  Explained that would adjust her radiation therapy appt to after her visit with Dr. Shen Ray and her chemotherapy infusion appt.

## 2018-03-30 NOTE — PLAN OF CARE
Problem: Patient Care Overview  Goal: Plan of Care/Patient Progress Review  Discharge Planner PT   Patient plan for discharge: TCU  Current status: Pt agreeable to supine exercises, declining sitting EOB 2/2 fatigue. Pt continues to require frequent rest breaks 2/2 fatigue. One episode painful voiding during session. Pt left resting comfortably end of session   Barriers to return to prior living situation: Weakness, endurance, strength  Recommendations for discharge: [facility]  Rationale for recommendations: To improve functional mobility        Entered by: Chantal Becerril 03/30/2018 9:33 AM

## 2018-03-30 NOTE — PLAN OF CARE
Problem: Patient Care Overview  Goal: Plan of Care/Patient Progress Review  OT 7D  Discharge Planner OT   Patient plan for discharge: rehab  Current status: Pt supine > sitting EOB with HOB 40 degrees with mod A for trunk control; significant improvement in pain management at site of fractured ribs this date with HOB at this level versus at 90 or flat. Pt STS x 2 CGA with VCs for hand placement. Pt tolerating standing ~2-4 minutes; upon first STS with intention to complete fucntional mobility OOR, pt becoming incontinent of stool and experience significant pain with urination. On second stand, pt dependent for pericares. Pt able to sidestep up EOB and return to supine with step-by-step VCs and mod A for trunk control and mobilizing LEs. VCs for bridging technique to repositioning vs rolling. Facilitated independence with bed mobility to allow for linen change given soiled sheets; SBA for rolling bilaterally. Pt reporting significant fatigue and requiring up to 6L O2 to maintain O2 at 94% with activity.   Barriers to return to prior living situation: functional strength and endurance, pain, medical needs  Recommendations for discharge: TCU  Rationale for recommendations: Pt will benefit from further OT/PT to progress functional safety and independence in I/ADLs as pt is far below baseline.       Entered by: Brenda Alex 03/30/2018 4:51 PM

## 2018-03-30 NOTE — PLAN OF CARE
Problem: Patient Care Overview  Goal: Plan of Care/Patient Progress Review  Alert and oriented X 4. AVSS. Denies chest pain or difficulty breathing. SpO2 94% on oxygen at 3 LPM/NC. C/o moderate pain across upper abdomen. Described pain as pressure. Tramadol and acetaminophen administered with relief. TPN and lipids infusing. Tolerating well. Blood glucose 127 at 0353. Pt had one medium-sized loose stool. Incontinence cares done. Barrier ointment applied to denuded skin in perineum, thighs and buttocks. Sleeping in between cares.

## 2018-03-30 NOTE — PROGRESS NOTES
SPIRITUAL HEALTH SERVICES  SPIRITUAL ASSESSMENT Progress Note  [facility] ([place]) 7D     REFERRAL SOURCE: Length of stay    I met with Elizabeth Taylor today to introduce myself and explain the role of Spiritual Health Services. Elizabeth is hospital day 12. She is open to  visits but was getting ready to participate in a Physical Therapy session. Elizabeth requested that I come back next week. I also provided written information with my contact number.    PLAN: [facility] will visit next week for spiritual assessment and support.    Felihsa Gray  Oncology   Pager [phone]

## 2018-03-30 NOTE — PLAN OF CARE
Problem: Patient Care Overview  Goal: Plan of Care/Patient Progress Review  Edema 7D: Significant reductions seen in R LE with generalized 1+ pitting distally. Patient fit with size F comperm compression stocking to R LE from MTPs to knee creases for further gentle management of edema. L LE with soft 2+ pitting remaining. Reapplication of GCB from MTPs to knee creases on L LE. Remove all compression if causing pain/numbness or become soiled.

## 2018-03-30 NOTE — PLAN OF CARE
Problem: Patient Care Overview  Goal: Plan of Care/Patient Progress Review    VSS. Afebrile. Pt sats > 93 on 3L NC. Incontinent x 1 this shift. Joiner draining nelson urine. Perineal and BL buttocks wounds cleaned according to C nurse note. Aquaphor used in place of proshield barrier cream. 10% Dextrose infusion K+ replaced. TPN/Lipids started tonight. BG checks q 4hrs WNL. Pt on clear liquid diet. Pt rested for much of shift. Continue POC.

## 2018-03-30 NOTE — PROGRESS NOTES
Social Work Services Progress Note    Hospital Day: 11  Date of Initial Social Work Evaluation:  To be completed.  Collaborated with:  Rosa Gonzalez FNP-BC, TCU admissions staff    Data:  SW consulted to meet w/ patient for discharge planning. Patient started on TPN and will need at time of discharge.    Intervention:  THOMAS discussed medication questions posed by [place] liaison Nicol (p: [phone]) w/ Rosa Gonzalez FNP-BC re: if patient will need to be on pyridium and octreotide at discharge, may be a barrier to placement due to cost. Per Rosa, patient will not be discharged on these medications. Nicol will be out of the office today, SW will follow-up w/ her on Monday 04/02 to provide this update. Nicol also confirms that [facility] does accept patients on TPN, facility would need RN-to-RN update on Monday 04/02 if planning discharge (facility director of nursing Shobha Martinez p: [phone]).    THOMAS spoke w/ Ora in admissions at [facility] (p: [phone]), facility does accept TPN but would not have any open beds until mid-next week, patient will likely be ready for discharge on Monday 04/02 per Rosa. THOMAS did receive return call from Joelle in admissions who recommends SW check back in w/ them on Monday 04/02 to see if any beds have opened for patient.     REFERRALS  [facility] - Declined; do not accept patients w/ NG tube  [facility] - Declined; do not accept patients w/ NG tube  [facility] - Declined  [facility] - Referral sent. Per Ora in admissions, able to accept patient's on TPN, but will not have any beds available until middle of next week. SW to follow-up on Monday 04/02 to see if any openings.  [facility] - Reviewing. THOMAS confirmed w/ Rosa that patient will not be discharged on pyridium or octreotide, SW will provide this update to Nicol admissions liaison on Monday 04/02.  [facility] - THOMAS sent referral to [facility] w/  admissions,  reviewing.    Assessment:  Patient continues to be open to TCU placement at time of discharge.    Plan:    Anticipated Disposition:  Facility:  TCU, pending placement    Barriers to d/c plan:  Medical stability. Still need accepting TCU, bed availability.    Follow Up:  SW to continue following for discharge planning.    JEFF Bull, LGSW  7D Oncology   Phone [phone]  Pager [phone]

## 2018-03-31 ENCOUNTER — APPOINTMENT (OUTPATIENT)
Dept: OCCUPATIONAL THERAPY | Facility: CLINIC | Age: 78
DRG: 393 | End: 2018-03-31
Payer: MEDICARE

## 2018-03-31 ENCOUNTER — APPOINTMENT (OUTPATIENT)
Dept: PHYSICAL THERAPY | Facility: CLINIC | Age: 78
DRG: 393 | End: 2018-03-31
Payer: MEDICARE

## 2018-03-31 LAB
ALBUMIN SERPL-MCNC: 1 G/DL (ref 3.4–5)
ALP SERPL-CCNC: 80 U/L (ref 40–150)
ALT SERPL W P-5'-P-CCNC: 14 U/L (ref 0–50)
ANION GAP SERPL CALCULATED.3IONS-SCNC: 3 MMOL/L (ref 3–14)
APTT PPP: 31 SEC (ref 22–37)
AST SERPL W P-5'-P-CCNC: 16 U/L (ref 0–45)
BASOPHILS # BLD AUTO: 0 10E9/L (ref 0–0.2)
BASOPHILS NFR BLD AUTO: 0 %
BILIRUB SERPL-MCNC: 0.3 MG/DL (ref 0.2–1.3)
BUN SERPL-MCNC: 11 MG/DL (ref 7–30)
CALCIUM SERPL-MCNC: 7.2 MG/DL (ref 8.5–10.1)
CHLORIDE SERPL-SCNC: 103 MMOL/L (ref 94–109)
CO2 SERPL-SCNC: 30 MMOL/L (ref 20–32)
CREAT SERPL-MCNC: 0.42 MG/DL (ref 0.52–1.04)
DIFFERENTIAL METHOD BLD: ABNORMAL
EOSINOPHIL # BLD AUTO: 0 10E9/L (ref 0–0.7)
EOSINOPHIL NFR BLD AUTO: 0 %
ERYTHROCYTE [DISTWIDTH] IN BLOOD BY AUTOMATED COUNT: 15 % (ref 10–15)
GFR SERPL CREATININE-BSD FRML MDRD: >90 ML/MIN/1.7M2
GLUCOSE BLDC GLUCOMTR-MCNC: 108 MG/DL (ref 70–99)
GLUCOSE BLDC GLUCOMTR-MCNC: 110 MG/DL (ref 70–99)
GLUCOSE BLDC GLUCOMTR-MCNC: 111 MG/DL (ref 70–99)
GLUCOSE BLDC GLUCOMTR-MCNC: 116 MG/DL (ref 70–99)
GLUCOSE SERPL-MCNC: 112 MG/DL (ref 70–99)
HCT VFR BLD AUTO: 32.9 % (ref 35–47)
HGB BLD-MCNC: 10.5 G/DL (ref 11.7–15.7)
INR PPP: 1.07 (ref 0.86–1.14)
LYMPHOCYTES # BLD AUTO: 0.1 10E9/L (ref 0.8–5.3)
LYMPHOCYTES NFR BLD AUTO: 1 %
MAGNESIUM SERPL-MCNC: 2 MG/DL (ref 1.6–2.3)
MCH RBC QN AUTO: 30 PG (ref 26.5–33)
MCHC RBC AUTO-ENTMCNC: 31.9 G/DL (ref 31.5–36.5)
MCV RBC AUTO: 94 FL (ref 78–100)
MONOCYTES # BLD AUTO: 0.2 10E9/L (ref 0–1.3)
MONOCYTES NFR BLD AUTO: 3.9 %
MYELOCYTES # BLD: 0.2 10E9/L
MYELOCYTES NFR BLD MANUAL: 3.9 %
NEUTROPHILS # BLD AUTO: 4.8 10E9/L (ref 1.6–8.3)
NEUTROPHILS NFR BLD AUTO: 89.2 %
PHOSPHATE SERPL-MCNC: 2 MG/DL (ref 2.5–4.5)
PLATELET # BLD AUTO: 220 10E9/L (ref 150–450)
PLATELET # BLD EST: ABNORMAL 10*3/UL
POTASSIUM SERPL-SCNC: 3.8 MMOL/L (ref 3.4–5.3)
PROMYELOCYTES # BLD MANUAL: 0.1 10E9/L
PROMYELOCYTES NFR BLD MANUAL: 2 %
PROT SERPL-MCNC: 4.1 G/DL (ref 6.8–8.8)
RBC # BLD AUTO: 3.5 10E12/L (ref 3.8–5.2)
RBC MORPH BLD: NORMAL
SODIUM SERPL-SCNC: 136 MMOL/L (ref 133–144)
WBC # BLD AUTO: 5.4 10E9/L (ref 4–11)
ZINC SERPL-MCNC: 34 UG/DL (ref 60–120)

## 2018-03-31 PROCEDURE — 99232 SBSQ HOSP IP/OBS MODERATE 35: CPT | Mod: GC | Performed by: INTERNAL MEDICINE

## 2018-03-31 PROCEDURE — 97530 THERAPEUTIC ACTIVITIES: CPT | Mod: GP

## 2018-03-31 PROCEDURE — 82962 GLUCOSE BLOOD TEST: CPT

## 2018-03-31 PROCEDURE — 25000132 ZZH RX MED GY IP 250 OP 250 PS 637: Mod: GY | Performed by: NURSE PRACTITIONER

## 2018-03-31 PROCEDURE — 25000132 ZZH RX MED GY IP 250 OP 250 PS 637: Mod: GY | Performed by: INTERNAL MEDICINE

## 2018-03-31 PROCEDURE — 40000133 ZZH STATISTIC OT WARD VISIT: Performed by: OCCUPATIONAL THERAPIST

## 2018-03-31 PROCEDURE — 97535 SELF CARE MNGMENT TRAINING: CPT | Mod: GO | Performed by: OCCUPATIONAL THERAPIST

## 2018-03-31 PROCEDURE — 85025 COMPLETE CBC W/AUTO DIFF WBC: CPT | Performed by: PHYSICIAN ASSISTANT

## 2018-03-31 PROCEDURE — 85730 THROMBOPLASTIN TIME PARTIAL: CPT | Performed by: PHYSICIAN ASSISTANT

## 2018-03-31 PROCEDURE — 40000193 ZZH STATISTIC PT WARD VISIT

## 2018-03-31 PROCEDURE — 25000128 H RX IP 250 OP 636: Performed by: INTERNAL MEDICINE

## 2018-03-31 PROCEDURE — 84100 ASSAY OF PHOSPHORUS: CPT | Performed by: PHYSICIAN ASSISTANT

## 2018-03-31 PROCEDURE — A9270 NON-COVERED ITEM OR SERVICE: HCPCS | Mod: GY | Performed by: NURSE PRACTITIONER

## 2018-03-31 PROCEDURE — 36592 COLLECT BLOOD FROM PICC: CPT | Performed by: PHYSICIAN ASSISTANT

## 2018-03-31 PROCEDURE — 97110 THERAPEUTIC EXERCISES: CPT | Mod: GP

## 2018-03-31 PROCEDURE — 80053 COMPREHEN METABOLIC PANEL: CPT | Performed by: PHYSICIAN ASSISTANT

## 2018-03-31 PROCEDURE — A9270 NON-COVERED ITEM OR SERVICE: HCPCS | Mod: GY | Performed by: INTERNAL MEDICINE

## 2018-03-31 PROCEDURE — 85610 PROTHROMBIN TIME: CPT | Performed by: PHYSICIAN ASSISTANT

## 2018-03-31 PROCEDURE — 25000125 ZZHC RX 250: Performed by: INTERNAL MEDICINE

## 2018-03-31 PROCEDURE — 12000008 ZZH R&B INTERMEDIATE UMMC

## 2018-03-31 PROCEDURE — 25000125 ZZHC RX 250: Performed by: PHYSICIAN ASSISTANT

## 2018-03-31 PROCEDURE — 83735 ASSAY OF MAGNESIUM: CPT | Performed by: PHYSICIAN ASSISTANT

## 2018-03-31 PROCEDURE — 25000128 H RX IP 250 OP 636: Performed by: PHYSICIAN ASSISTANT

## 2018-03-31 RX ORDER — DIPHENOXYLATE HCL/ATROPINE 2.5-.025MG
1 TABLET ORAL 4 TIMES DAILY
Status: DISCONTINUED | OUTPATIENT
Start: 2018-03-31 | End: 2018-04-02

## 2018-03-31 RX ADMIN — DIPHENOXYLATE HYDROCHLORIDE AND ATROPINE SULFATE 1 TABLET: 2.5; .025 TABLET ORAL at 17:10

## 2018-03-31 RX ADMIN — DIPHENHYDRAMINE HYDROCHLORIDE AND LIDOCAINE HYDROCHLORIDE AND ALUMINUM HYDROXIDE AND MAGNESIUM HYDRO 5 ML: KIT at 20:38

## 2018-03-31 RX ADMIN — ACETAMINOPHEN 650 MG: 325 TABLET, FILM COATED ORAL at 12:12

## 2018-03-31 RX ADMIN — Medication 2 G: at 07:52

## 2018-03-31 RX ADMIN — ACETAMINOPHEN 650 MG: 325 TABLET, FILM COATED ORAL at 21:32

## 2018-03-31 RX ADMIN — ACETAMINOPHEN 650 MG: 325 TABLET, FILM COATED ORAL at 08:30

## 2018-03-31 RX ADMIN — TRAMADOL HYDROCHLORIDE 50 MG: 50 TABLET, COATED ORAL at 12:12

## 2018-03-31 RX ADMIN — DIPHENOXYLATE HYDROCHLORIDE AND ATROPINE SULFATE 1 TABLET: 2.5; .025 TABLET ORAL at 20:20

## 2018-03-31 RX ADMIN — ACETAMINOPHEN 650 MG: 325 TABLET, FILM COATED ORAL at 17:10

## 2018-03-31 RX ADMIN — ACETAMINOPHEN 650 MG: 325 TABLET, FILM COATED ORAL at 01:46

## 2018-03-31 RX ADMIN — TRAMADOL HYDROCHLORIDE 50 MG: 50 TABLET, COATED ORAL at 04:11

## 2018-03-31 RX ADMIN — POTASSIUM CHLORIDE: 2 INJECTION, SOLUTION, CONCENTRATE INTRAVENOUS at 20:06

## 2018-03-31 RX ADMIN — ENOXAPARIN SODIUM 40 MG: 40 INJECTION SUBCUTANEOUS at 20:20

## 2018-03-31 RX ADMIN — POTASSIUM PHOSPHATE, MONOBASIC AND POTASSIUM PHOSPHATE, DIBASIC 15 MMOL: 224; 236 INJECTION, SOLUTION INTRAVENOUS at 12:36

## 2018-03-31 RX ADMIN — DIPHENHYDRAMINE HYDROCHLORIDE AND LIDOCAINE HYDROCHLORIDE AND ALUMINUM HYDROXIDE AND MAGNESIUM HYDRO 5 ML: KIT at 10:09

## 2018-03-31 RX ADMIN — HYDROCORTISONE: 25 CREAM TOPICAL at 20:20

## 2018-03-31 RX ADMIN — POTASSIUM CHLORIDE 10 MEQ: 7.46 INJECTION, SOLUTION INTRAVENOUS at 10:10

## 2018-03-31 RX ADMIN — POTASSIUM CHLORIDE 10 MEQ: 7.46 INJECTION, SOLUTION INTRAVENOUS at 11:21

## 2018-03-31 RX ADMIN — TRAMADOL HYDROCHLORIDE 50 MG: 50 TABLET, COATED ORAL at 21:32

## 2018-03-31 ASSESSMENT — PAIN DESCRIPTION - DESCRIPTORS
DESCRIPTORS: ACHING
DESCRIPTORS: TIGHTNESS
DESCRIPTORS: TIGHTNESS
DESCRIPTORS: ACHING
DESCRIPTORS: ACHING;TIGHTNESS
DESCRIPTORS: TIGHTNESS
DESCRIPTORS: ACHING

## 2018-03-31 NOTE — PLAN OF CARE
Problem: Patient Care Overview  Goal: Plan of Care/Patient Progress Review  Outcome: Improving  7608-7518: VSS. O2 sats stable above 92% on 3LPM via NC. LS clear, diminished. Tylenol given early this morning for abdominal discomfort. Diarrhea has slowed but still having incontinent stools. Joiner cares completed with each stool. Wound care to rectal and elfego area completed per order with each stooling. Area is improving; much less reddened. Still edematous. MMW provided with oral cares. PIV placed for phos replacement (cannot infuse in same line as TPN). Phos, Potassium and Magnesium replaced per protocol with rechecks scheduled with AM labs. Continuous TPN infusing. BG checks continue q72 hours. BG was 147. Bilat legs wrapped per lymphedema. Worked with PT in bed today. Spirits better today. Ate vegetable broth, orange jello and orange popsicle for both breakfast and lunch.    9181-3683: VSS. Afebrile. O2 sats stable above 92% on 3LPM via NC. BG at 1900 was 131; next check around 0300 or with AM labs with TPN checks. TPN and Lipids infusing via PORT. Tramadol and Tylenol given at HS per pt request to help her have a restful night. MMW provided with oral care. Ate vegetable broth, orange jello and orange popsicle for dinner. Needs reminders and assistance with repositioning, especially overnight. Pt may possibly d/c to TCU 4/2 or 4/3. Cont POC.

## 2018-03-31 NOTE — PLAN OF CARE
Problem: Patient Care Overview  Goal: Plan of Care/Patient Progress Review  Edema 7D: Reductions noted in B LE edema with generalized 1+ pitting remaining distally; skin intact. Patient fit with size F comperm compression stockings from MTPs to knee creases for further management of edema and increased ease with functional mobility. Compression stockings to be worn 23/24 hours per day and removed daily for skin cares however removed completely if causing pain/numbness or become soiled - see patient care order for details.

## 2018-03-31 NOTE — PROGRESS NOTES
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     Hospital complications:    #Stage IIIA (T2 N1) SCC of anus  -Followed by Dr. Ray  -S/p 1 cycle 5-FU + mitomycin (D1 2/12) followed by 1 cycle single-agent 5-FU (d/t toxicity and age, D29 3/11) with concurrent radiation  -Completed radiation 3/23  -Supportive rectal cares ordered  -Intermittently expressing feelings of hopelessness and feeling at peace with dying, not wanting to continue with treatment. Consulted palliative care SW for assistance with illness coping and decision making, appreciate their assistance. Feeling more hopeful now that she has completed XRT, will continue supportive cares, she has f/u appt with Dr. Ray in ~2 weeks.    #Hypoxemia  #Hydropneumothorax, right side, likely secondary to fractured ribs  #Hydrothorax, left side  Stable respiratory status on 2-3 L NC likely 2/2 to b/l Pleural effusions  - CXR on 3/29- Trace right apical pneumothorax. Moderate layering bilateral pleural effusions with bilateral lower lobes and left retrocardiac opacities, atelectasis versus  Consolidation.  - Oxygen demands stable today    Malignancy/Treatment related (chemotherapy/XRT) complications:    #Intractable diarrhea  #Nausea  #Dehydration  #Electrolyte derangements  #Small bowel obstruction, resolved  -Suspect nausea, diarrhea secondary to mucositis from recent chemotherapy (5-FU) and XRT. C diff & enteric panel were negative.   - add back Lomotil on 3/31   - AXR 3/27 for NG tube confirmation noted concern for possible SBO; CT CAP 3/28 shows SBO; Continue to hold tube feeds, TPN instead per below.   - Likely her anal sphincter tone is compromised, and incontinence will continue to be an issue.     -Antiemetics also available (Zofran, Compazine). No vomiting since admission.  -High lyte SS.  - Clear liquid diet, as tolerated    #Perianal wound, secondary to radiation  - Pain controlled with tylenol and tramadol PRN   - Appreciate C assistance in management of this wound  - Continue TPN given concern for compromised wound healing after XRT with malnutrition    #Pancytopenia  -Secondary to underlying disease, recent chemo/XRT  -Transfuse for Hgb <8, PLT <10K - no transfusion needs thus far    #Weakness  #Fall at home  #Fractured R 8th, 9th and 10th rib  -Suspect weakness, falls secondary to above issues.  -Fall precautions  - Has known pneumothorax, likely from this; Monitor.   -PT/OT --> encouraged patient to work with them, she is very motivated but struggling with pain and weakness.   -Also has Tylenol, ketamine/gabapentin/lidocaine and diclofenac available topically, which she finds helpful, so will continue.    #Malnutrition, in context of acute on chronic illness  - % Intake: </= 50% for >/= 5 days (severe); Subcutaneous Fat Loss: Facial region: Moderate, Upper arm and Thoracic/intercostal: Severe   - Calorie counts started 3/22, minimal intake, not meeting nutritional needs  - Suspect lack of good PO intake is contributing to impaired wound healing   - NG tube placement 3/27 with plan for tube feedings, however found to have SBO.   - Continue TPN (3/29-present). Labs Qdaily.     #Coagulopathy r/t severe malnutrition & infection (UTI)  -Suspect secondary to malnutrition. Dosing daily with vitamin K PRN.  -Got PO vitamin K 5 mg (3/19, 3/20, 3/21, 2x on 3/22, 3/23 and 3/27 (for INR of 1.87) none since  -INR 1/44 today   - Hopefully starting nutrition will help with this.     #UTI (Klebsiella pneumoniae & proteus mirabilis)  -Continue Cipro (x3/21) 500mg BID, plan for 10 days of therapy (x3/21-3/30).  -Repeat UA continues to be dirty with hyaline casts, didn't reflex to culture.    #Altered mental status  >> improved/resolved likely r/t MEDS 3/25 AM  -Nonsensical statements this morning, disoriented. 3/24 AM.  -Was getting concurrent oxycodone/opium tincture. Will now hold.  -Hypoxia 2/2 to volume overload.  -Blood culture: NTD  -Urine culture: 3/19 with klebsiella PNA on ciprofloxacin  -CXR with pleural effusions.     PAIN # Pain Assessment:  Current Pain Score 3/31/2018   Patient currently in pain? yes   Pain score (0-10) -   Pain location Abdomen   Pain descriptors Tightness   - Elizabeth is experiencing pain due to fractured ribs, radiation treatment. Pain management was discussed and the plan was created in a collaborative fashion.  Elizabeth's response to the current recommendations: engaged  compliant  - Opioid regimen: tramadol and tylenol PRN (oxycodone and opiod tincture caused ANS)   - Pharmacologic adjuvants: topical diclofenac gel, topical ketamine/gabapentin/lidocaine cream   FEN - Fluid bolus PRN  - Start TPN (3/29)  - continue farias placement   PPX (VTE & GI) - lovenox SQ 40 mg daily (plt 114)  - Bowel regimen: not needed- having diarrhea   LINES & DRAINS & WOUNDS - Port  - Farias  - Wound on sacrum   CONSULTS - OT, PT, WOC, nutrition   CODE - FULL   DISPO - Anticipate d/c to TCU  4/2 or 4/3 on TPN days pending tolerance to TPN and control of pain.      Above plan discussed with staff physician, Dr. Argueta.     William Corona MD  Heme/Onc Fellow  Pager: [phone]      Interval history  Elizabeth is feeling better today. Had a shower. Eating clear liquids and tolerating this. ~5 loose stools yesterday.   Pain is controlled.     Physical Exam  Constitutional: Awake, alert, cooperative, in NAD. Elderly/frail and cachectic.   Eyes: PERRL, EOMI, sclera clear, conjunctiva normal.  ENT: Normocephalic, without obvious abnormality, moist mucus membranes, no lesions or thrush. NG tube in place.   Respiratory: Non-labored breathing, absent in bases, no wheezing, no rales.   Cardiovascular: RRR, no murmur noted.  GI: +BS,  soft, non-distended, non-tender   Skin: No concerning lesions or rash on exposed areas.  Musculoskeletal: moving all ext  Neurologic: Awake, A&O x 3. Cranial nerves II-XII are grossly intact.   Neuropsychiatric: Calm, normal affect, not confused, memory intact, judgement/insight intact.     Rounding:  Temp:  [97.3  F (36.3  C)-98.6  F (37  C)] 97.7  F (36.5  C)  Pulse:  [74] 74  Heart Rate:  [70-92] 92  Resp:  [15-20] 20  BP: (103-122)/(59-71) 103/59  SpO2:  [93 %-96 %] 93 %    I/O last 3 completed shifts:  In: 1982.96 [P.O.:360; I.V.:750]  Out: 625 [Urine:550; Stool:75]    Vitals:    03/20/18 0744 03/21/18 2030 03/23/18 1652 03/29/18 0900   Weight: 57.6 kg (126 lb 14.4 oz) 60.5 kg (133 lb 4.8 oz) 62.9 kg (138 lb 11.2 oz) 58.4 kg (128 lb 12.8 oz)    03/31/18 1156   Weight: 60.7 kg (133 lb 13.1 oz)       Recent Labs  Lab 03/31/18 0625 03/30/18  0646 03/29/18  0633 03/28/18  0556   WBC 5.4 5.6 5.9 6.0   RBC 3.50* 3.43* 3.66* 3.59*   HGB 10.5* 10.3* 11.1* 10.9*   HCT 32.9* 31.8* 34.3* 34.1*   MCV 94 93 94 95   MCH 30.0 30.0 30.3 30.4   MCHC 31.9 32.4 32.4 32.0   RDW 15.0 15.0 14.9 15.0    222 212 197       Recent Labs  Lab 03/31/18 0625 03/30/18  0646 03/29/18  0633 03/28/18  0556    137 137 136   POTASSIUM 3.8 3.7 3.6 4.2   CHLORIDE 103 102 101 102   CO2 30 30 30 28   ANIONGAP 3 5 6 6   * 118* 49* 71   BUN 11 9 7 8   CR 0.42* 0.52 0.62 0.59   GFRESTIMATED >90 >90 >90 >90   GFRESTBLACK >90 >90 >90 >90   ELISE 7.2* 7.7* 7.6* 7.5*   MAG 2.0 1.9 2.1 2.0   PHOS 2.0* 2.0* 3.2 2.6   PROTTOTAL 4.1* 3.8* 4.0* 4.0*   ALBUMIN 1.0* 0.9* 1.1* 1.0*   BILITOTAL 0.3 0.8 0.5 0.6   ALKPHOS 80 75 83 81   AST 16 22 19 14   ALT 14 14 15 14     No results found for this or any previous visit (from the past 24 hour(s)).  Anti-infectives     None          diphenoxylate-atropine  1 tablet Oral 4x Daily     lipids  250 mL Intravenous Once per day on Mon Tue Wed Thu Fri     heparin lock flush  5-10 mL Intracatheter Q24H      heparin  5 mL Intracatheter Q28 Days     calcium-vitamin D  1 tablet Oral Daily     multivitamin, therapeutic  1 tablet Oral Daily     hydrocortisone   Rectal BID     vitamin B complex with vitamin C  1 tablet Oral Daily     enoxaparin  40 mg Subcutaneous Q24H       parenteral nutrition - ADULT compounded formula       parenteral nutrition - ADULT compounded formula 40 mL/hr at 03/30/18 1947     IV fluid REPLACEMENT ONLY       - MEDICATION INSTRUCTIONS -

## 2018-03-31 NOTE — PLAN OF CARE
Problem: Patient Care Overview  Goal: Plan of Care/Patient Progress Review  Alert and oriented X 4 with occasional forgetfulness. C/o generalized aches. Acetaminophen and tramadol give with relief. TPN and lipids infusing. Tolerating well. Had three large watery stools. Perineum cleansed after each incontinence episode. Barrier ointment applied. Sleeping in between cares.

## 2018-03-31 NOTE — PLAN OF CARE
Problem: Patient Care Overview  Goal: Plan of Care/Patient Progress Review  PT 7D:     Discharge Planner PT   Patient plan for discharge: did not discuss today  Current status: Engaged pt in bed mobility with mod A and mostly limited by pain, sit <> stand at min A to CGA with FWW, standing exercises and tolerance training within FWW. Pt with tachycardia up to 120s with standing and desat 88% wearing 4L O2, requiring up to 6L. Pt also symptomatic with dizziness. Pt also demonstrating anxiety with movement, likely also contributing to poor standing tolerance.   Barriers to return to prior living situation: medical status, pain, wounds, O2 needs, home set up  Recommendations for discharge: TCU  Rationale for recommendations: Pt is well below baseline for mobility and will require continued rehab to return to PLOF.        Entered by: Jane Jarvis 03/31/2018 5:08 PM

## 2018-04-01 LAB
ALBUMIN SERPL-MCNC: 1 G/DL (ref 3.4–5)
ALP SERPL-CCNC: 90 U/L (ref 40–150)
ALT SERPL W P-5'-P-CCNC: 14 U/L (ref 0–50)
ANION GAP SERPL CALCULATED.3IONS-SCNC: 2 MMOL/L (ref 3–14)
APTT PPP: 34 SEC (ref 22–37)
AST SERPL W P-5'-P-CCNC: 17 U/L (ref 0–45)
BASOPHILS # BLD AUTO: 0 10E9/L (ref 0–0.2)
BASOPHILS NFR BLD AUTO: 0.9 %
BILIRUB SERPL-MCNC: 0.3 MG/DL (ref 0.2–1.3)
BUN SERPL-MCNC: 12 MG/DL (ref 7–30)
CALCIUM SERPL-MCNC: 7.2 MG/DL (ref 8.5–10.1)
CHLORIDE SERPL-SCNC: 102 MMOL/L (ref 94–109)
CO2 SERPL-SCNC: 32 MMOL/L (ref 20–32)
CREAT SERPL-MCNC: 0.4 MG/DL (ref 0.52–1.04)
DIFFERENTIAL METHOD BLD: ABNORMAL
EOSINOPHIL # BLD AUTO: 0 10E9/L (ref 0–0.7)
EOSINOPHIL NFR BLD AUTO: 0.9 %
ERYTHROCYTE [DISTWIDTH] IN BLOOD BY AUTOMATED COUNT: 15.1 % (ref 10–15)
GFR SERPL CREATININE-BSD FRML MDRD: >90 ML/MIN/1.7M2
GLUCOSE BLDC GLUCOMTR-MCNC: 109 MG/DL (ref 70–99)
GLUCOSE BLDC GLUCOMTR-MCNC: 94 MG/DL (ref 70–99)
GLUCOSE SERPL-MCNC: 99 MG/DL (ref 70–99)
HCT VFR BLD AUTO: 31.4 % (ref 35–47)
HGB BLD-MCNC: 10.2 G/DL (ref 11.7–15.7)
INR PPP: 1.11 (ref 0.86–1.14)
LYMPHOCYTES # BLD AUTO: 0 10E9/L (ref 0.8–5.3)
LYMPHOCYTES NFR BLD AUTO: 0.9 %
MAGNESIUM SERPL-MCNC: 2 MG/DL (ref 1.6–2.3)
MCH RBC QN AUTO: 30.1 PG (ref 26.5–33)
MCHC RBC AUTO-ENTMCNC: 32.5 G/DL (ref 31.5–36.5)
MCV RBC AUTO: 93 FL (ref 78–100)
METAMYELOCYTES # BLD: 0.1 10E9/L
METAMYELOCYTES NFR BLD MANUAL: 2.7 %
MONOCYTES # BLD AUTO: 0 10E9/L (ref 0–1.3)
MONOCYTES NFR BLD AUTO: 0.9 %
MYELOCYTES # BLD: 0.1 10E9/L
MYELOCYTES NFR BLD MANUAL: 1.8 %
NEUTROPHILS # BLD AUTO: 4.9 10E9/L (ref 1.6–8.3)
NEUTROPHILS NFR BLD AUTO: 91 %
NRBC # BLD AUTO: 0 10*3/UL
NRBC BLD AUTO-RTO: 1 /100
PHOSPHATE SERPL-MCNC: 2.5 MG/DL (ref 2.5–4.5)
PLATELET # BLD AUTO: 241 10E9/L (ref 150–450)
PLATELET # BLD EST: ABNORMAL 10*3/UL
POTASSIUM SERPL-SCNC: 4.3 MMOL/L (ref 3.4–5.3)
PROMYELOCYTES # BLD MANUAL: 0 10E9/L
PROMYELOCYTES NFR BLD MANUAL: 0.9 %
PROT SERPL-MCNC: 4 G/DL (ref 6.8–8.8)
RBC # BLD AUTO: 3.39 10E12/L (ref 3.8–5.2)
RBC MORPH BLD: NORMAL
SODIUM SERPL-SCNC: 136 MMOL/L (ref 133–144)
WBC # BLD AUTO: 5.4 10E9/L (ref 4–11)

## 2018-04-01 PROCEDURE — 25000128 H RX IP 250 OP 636: Performed by: INTERNAL MEDICINE

## 2018-04-01 PROCEDURE — 25000132 ZZH RX MED GY IP 250 OP 250 PS 637: Mod: GY | Performed by: INTERNAL MEDICINE

## 2018-04-01 PROCEDURE — 25000125 ZZHC RX 250: Performed by: PHYSICIAN ASSISTANT

## 2018-04-01 PROCEDURE — 80053 COMPREHEN METABOLIC PANEL: CPT | Performed by: PHYSICIAN ASSISTANT

## 2018-04-01 PROCEDURE — 25000125 ZZHC RX 250: Performed by: INTERNAL MEDICINE

## 2018-04-01 PROCEDURE — 84100 ASSAY OF PHOSPHORUS: CPT | Performed by: PHYSICIAN ASSISTANT

## 2018-04-01 PROCEDURE — 25000128 H RX IP 250 OP 636: Performed by: PHYSICIAN ASSISTANT

## 2018-04-01 PROCEDURE — 00000146 ZZHCL STATISTIC GLUCOSE BY METER IP

## 2018-04-01 PROCEDURE — A9270 NON-COVERED ITEM OR SERVICE: HCPCS | Mod: GY | Performed by: INTERNAL MEDICINE

## 2018-04-01 PROCEDURE — 85610 PROTHROMBIN TIME: CPT | Performed by: PHYSICIAN ASSISTANT

## 2018-04-01 PROCEDURE — 25000132 ZZH RX MED GY IP 250 OP 250 PS 637: Mod: GY | Performed by: NURSE PRACTITIONER

## 2018-04-01 PROCEDURE — 85730 THROMBOPLASTIN TIME PARTIAL: CPT | Performed by: PHYSICIAN ASSISTANT

## 2018-04-01 PROCEDURE — 99232 SBSQ HOSP IP/OBS MODERATE 35: CPT | Mod: GC | Performed by: INTERNAL MEDICINE

## 2018-04-01 PROCEDURE — A9270 NON-COVERED ITEM OR SERVICE: HCPCS | Mod: GY | Performed by: NURSE PRACTITIONER

## 2018-04-01 PROCEDURE — 12000008 ZZH R&B INTERMEDIATE UMMC

## 2018-04-01 PROCEDURE — 83735 ASSAY OF MAGNESIUM: CPT | Performed by: PHYSICIAN ASSISTANT

## 2018-04-01 PROCEDURE — 85025 COMPLETE CBC W/AUTO DIFF WBC: CPT | Performed by: PHYSICIAN ASSISTANT

## 2018-04-01 PROCEDURE — 36592 COLLECT BLOOD FROM PICC: CPT | Performed by: PHYSICIAN ASSISTANT

## 2018-04-01 RX ADMIN — ENOXAPARIN SODIUM 40 MG: 40 INJECTION SUBCUTANEOUS at 20:04

## 2018-04-01 RX ADMIN — DIPHENOXYLATE HYDROCHLORIDE AND ATROPINE SULFATE 1 TABLET: 2.5; .025 TABLET ORAL at 07:56

## 2018-04-01 RX ADMIN — TRAMADOL HYDROCHLORIDE 50 MG: 50 TABLET, COATED ORAL at 15:57

## 2018-04-01 RX ADMIN — POTASSIUM CHLORIDE: 2 INJECTION, SOLUTION, CONCENTRATE INTRAVENOUS at 20:02

## 2018-04-01 RX ADMIN — POTASSIUM PHOSPHATE, MONOBASIC AND POTASSIUM PHOSPHATE, DIBASIC 10 MMOL: 224; 236 INJECTION, SOLUTION INTRAVENOUS at 10:07

## 2018-04-01 RX ADMIN — Medication 2 G: at 07:55

## 2018-04-01 RX ADMIN — DIPHENHYDRAMINE HYDROCHLORIDE AND LIDOCAINE HYDROCHLORIDE AND ALUMINUM HYDROXIDE AND MAGNESIUM HYDRO 5 ML: KIT at 04:23

## 2018-04-01 RX ADMIN — ACETAMINOPHEN 650 MG: 325 TABLET, FILM COATED ORAL at 22:04

## 2018-04-01 RX ADMIN — HYDROCORTISONE: 25 CREAM TOPICAL at 20:04

## 2018-04-01 RX ADMIN — DIPHENHYDRAMINE HYDROCHLORIDE AND LIDOCAINE HYDROCHLORIDE AND ALUMINUM HYDROXIDE AND MAGNESIUM HYDRO 5 ML: KIT at 20:07

## 2018-04-01 RX ADMIN — DIPHENOXYLATE HYDROCHLORIDE AND ATROPINE SULFATE 1 TABLET: 2.5; .025 TABLET ORAL at 11:55

## 2018-04-01 RX ADMIN — ACETAMINOPHEN 650 MG: 325 TABLET, FILM COATED ORAL at 15:58

## 2018-04-01 RX ADMIN — TRAMADOL HYDROCHLORIDE 50 MG: 50 TABLET, COATED ORAL at 10:06

## 2018-04-01 RX ADMIN — HYDROCORTISONE: 25 CREAM TOPICAL at 09:20

## 2018-04-01 RX ADMIN — DIPHENOXYLATE HYDROCHLORIDE AND ATROPINE SULFATE 1 TABLET: 2.5; .025 TABLET ORAL at 20:04

## 2018-04-01 RX ADMIN — ACETAMINOPHEN 650 MG: 325 TABLET, FILM COATED ORAL at 04:09

## 2018-04-01 RX ADMIN — ACETAMINOPHEN 650 MG: 325 TABLET, FILM COATED ORAL at 10:06

## 2018-04-01 RX ADMIN — TRAMADOL HYDROCHLORIDE 50 MG: 50 TABLET, COATED ORAL at 22:04

## 2018-04-01 RX ADMIN — TRAMADOL HYDROCHLORIDE 50 MG: 50 TABLET, COATED ORAL at 04:08

## 2018-04-01 RX ADMIN — DIPHENOXYLATE HYDROCHLORIDE AND ATROPINE SULFATE 1 TABLET: 2.5; .025 TABLET ORAL at 15:57

## 2018-04-01 ASSESSMENT — PAIN DESCRIPTION - DESCRIPTORS
DESCRIPTORS: ACHING;TIGHTNESS

## 2018-04-01 NOTE — PROGRESS NOTES
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     Hospital complications:    #Stage IIIA (T2 N1) SCC of anus  -Followed by Dr. Ray  -S/p 1 cycle 5-FU + mitomycin (D1 2/12) followed by 1 cycle single-agent 5-FU (d/t toxicity and age, D29 3/11) with concurrent radiation  -Completed radiation 3/23  -Supportive rectal cares ordered  -Intermittently expressing feelings of hopelessness and feeling at peace with dying, not wanting to continue with treatment. Consulted palliative care SW for assistance with illness coping and decision making, appreciate their assistance. Feeling more hopeful now that she has completed XRT, will continue supportive cares, she has f/u appt with Dr. Ray in ~2 weeks.    #Hypoxemia  #Hydropneumothorax, right side, likely secondary to fractured ribs  #Hydrothorax, left side  Stable respiratory status on 2-3 L NC likely 2/2 to b/l Pleural effusions  - CXR on 3/29- Trace right apical pneumothorax. Moderate layering bilateral pleural effusions with bilateral lower lobes and left retrocardiac opacities, atelectasis versus  Consolidation.    Malignancy/Treatment related (chemotherapy/XRT) complications:    #Intractable diarrhea  #Nausea  #Dehydration  #Electrolyte derangements  #Small bowel obstruction, resolved  -Suspect nausea, diarrhea secondary to mucositis from recent chemotherapy (5-FU) and XRT. C diff & enteric panel were negative.   - add back Lomotil on 3/31   - AXR 3/27 for NG tube confirmation noted concern for possible SBO; CT CAP 3/28 shows SBO; did not start tube feeds, TPN instead per below.   - Likely her anal sphincter tone is compromised, and incontinence will continue to be an issue.    -Antiemetics also available  (Zofran, Compazine). No vomiting since admission.  -High lyte SS.  - Clear liquid diet, as tolerated    #Perianal wound, secondary to radiation  - Pain controlled with tylenol and tramadol PRN   - Appreciate WOC assistance in management of this wound  - Continue TPN given concern for compromised wound healing after XRT with malnutrition    #Pancytopenia  -Secondary to underlying disease, recent chemo/XRT  -Transfuse for Hgb <8, PLT <10K - no transfusion needs thus far    #Weakness  #Fall at home  #Fractured R 8th, 9th and 10th rib  -Suspect weakness, falls secondary to above issues.  -Fall precautions  - Has known pneumothorax, likely from this; Monitor.   -PT/OT --> encouraged patient to work with them, she is very motivated but struggling with pain and weakness.   -Also has Tylenol, ketamine/gabapentin/lidocaine and diclofenac available topically, which she finds helpful, so will continue.    #Malnutrition, in context of acute on chronic illness  - Continue TPN (3/29-present). Labs Qdaily.   - % Intake: </= 50% for >/= 5 days (severe); Subcutaneous Fat Loss: Facial region: Moderate, Upper arm and Thoracic/intercostal: Severe   - Calorie counts started 3/22, minimal intake, not meeting nutritional needs  - Suspect lack of good PO intake is contributing to impaired wound healing   - NG tube placement 3/27 with plan for tube feedings, however found to have SBO.     #Coagulopathy r/t severe malnutrition & infection (UTI)  -Suspect secondary to malnutrition. Dosing daily with vitamin K PRN.  -Got PO vitamin K 5 mg (3/19, 3/20, 3/21, 2x on 3/22, 3/23 and 3/27 (for INR of 1.87) none since  -INR 1/44 today   - Hopefully starting nutrition will help with this.     #UTI (Klebsiella pneumoniae & proteus mirabilis)  -Continue Cipro (x3/21) 500mg BID, plan for 10 days of therapy (x3/21-3/30).  -Repeat UA continues to be dirty with hyaline casts, didn't reflex to culture.    #Altered mental status >> improved/resolved likely  r/t MEDS 3/25 AM  -Nonsensical statements this morning, disoriented. 3/24 AM.  -Was getting concurrent oxycodone/opium tincture. Will now hold.  -Hypoxia 2/2 to volume overload.  -Blood culture: NTD  -Urine culture: 3/19 with klebsiella PNA on ciprofloxacin  -CXR with pleural effusions.     PAIN # Pain Assessment:  Current Pain Score 4/1/2018   Patient currently in pain? yes   Pain score (0-10) -   Pain location Abdomen   Pain descriptors Aching;Tightness   - Elizabeth is experiencing pain due to fractured ribs, radiation treatment. Pain management was discussed and the plan was created in a collaborative fashion.  Elizabeth's response to the current recommendations: engaged  compliant  - Opioid regimen: tramadol and tylenol PRN (oxycodone and opiod tincture caused ANS)   - Pharmacologic adjuvants: topical diclofenac gel, topical ketamine/gabapentin/lidocaine cream   FEN - Fluid bolus PRN  - Start TPN (3/29)  - continue farias placement   PPX (VTE & GI) - lovenox SQ 40 mg daily (plt 114)  - Bowel regimen: not needed- having diarrhea   LINES & DRAINS & WOUNDS - Port  - Farias  - Wound on sacrum   CONSULTS - OT, PT, WOC, nutrition   CODE - FULL   DISPO - Anticipate d/c to TCU 4/2 or 4/3 on TPN      Above plan discussed with staff physician, Dr. Argueta.     William Corona MD  Heme/Onc Fellow  Pager: [phone]      Interval history  Elizabeth is doing ok. Having more po intake - she had 5 purple popsicles overnight.   Diarrhea is decreasing. Pain in rectal area is improving.   Abdomen is slightly more distended. No n/v.     Physical Exam  Constitutional: Awake, alert, cooperative, in NAD. Elderly/frail and cachectic.   Eyes: PERRL, sclera clear, conjunctiva normal.  ENT: moist mucus membranes, no lesions or thrush.   Respiratory: Non-labored breathing, absent in bases, no wheezing, no rales.   Cardiovascular: RRR, no murmur noted.  GI: +BS, soft, slightly distended, non-tender   Skin: No concerning lesions or rash on exposed  areas.  Musculoskeletal: moving all ext  Neurologic: Awake, A&O x 3. Cranial nerves II-XII are grossly intact.   Neuropsychiatric: Calm, normal affect, not confused, memory intact, judgement/insight intact.     Rounding:  Temp:  [97.3  F (36.3  C)-98.9  F (37.2  C)] 98.2  F (36.8  C)  Heart Rate:  [67-92] 67  Resp:  [16-20] 16  BP: (103-131)/(52-74) 122/72  SpO2:  [90 %-96 %] 95 %    I/O last 3 completed shifts:  In: 1980 [P.O.:540; I.V.:520]  Out: 1400 [Urine:1325; Stool:75]    Vitals:    03/20/18 0744 03/21/18 2030 03/23/18 1652 03/29/18 0900   Weight: 57.6 kg (126 lb 14.4 oz) 60.5 kg (133 lb 4.8 oz) 62.9 kg (138 lb 11.2 oz) 58.4 kg (128 lb 12.8 oz)    03/31/18 1156   Weight: 60.7 kg (133 lb 13.1 oz)       Recent Labs  Lab 04/01/18  0554 03/31/18  0625 03/30/18  0646 03/29/18  0633   WBC 5.4 5.4 5.6 5.9   RBC 3.39* 3.50* 3.43* 3.66*   HGB 10.2* 10.5* 10.3* 11.1*   HCT 31.4* 32.9* 31.8* 34.3*   MCV 93 94 93 94   MCH 30.1 30.0 30.0 30.3   MCHC 32.5 31.9 32.4 32.4   RDW 15.1* 15.0 15.0 14.9    220 222 212       Recent Labs  Lab 04/01/18  0554 03/31/18  0625 03/30/18  0646 03/29/18  0633    136 137 137   POTASSIUM 4.3 3.8 3.7 3.6   CHLORIDE 102 103 102 101   CO2 32 30 30 30   ANIONGAP 2* 3 5 6   GLC 99 112* 118* 49*   BUN 12 11 9 7   CR 0.40* 0.42* 0.52 0.62   GFRESTIMATED >90 >90 >90 >90   GFRESTBLACK >90 >90 >90 >90   ELISE 7.2* 7.2* 7.7* 7.6*   MAG 2.0 2.0 1.9 2.1   PHOS 2.5 2.0* 2.0* 3.2   PROTTOTAL 4.0* 4.1* 3.8* 4.0*   ALBUMIN 1.0* 1.0* 0.9* 1.1*   BILITOTAL 0.3 0.3 0.8 0.5   ALKPHOS 90 80 75 83   AST 17 16 22 19   ALT 14 14 14 15     No results found for this or any previous visit (from the past 24 hour(s)).  Anti-infectives     None          diphenoxylate-atropine  1 tablet Oral 4x Daily     lipids  250 mL Intravenous Once per day on Mon Tue Wed Thu Fri     heparin lock flush  5-10 mL Intracatheter Q24H     heparin  5 mL Intracatheter Q28 Days     calcium-vitamin D  1 tablet Oral Daily      multivitamin, therapeutic  1 tablet Oral Daily     hydrocortisone   Rectal BID     vitamin B complex with vitamin C  1 tablet Oral Daily     enoxaparin  40 mg Subcutaneous Q24H       parenteral nutrition - ADULT compounded formula       parenteral nutrition - ADULT compounded formula 40 mL/hr at 03/31/18 2006     IV fluid REPLACEMENT ONLY       - MEDICATION INSTRUCTIONS -

## 2018-04-01 NOTE — PLAN OF CARE
Problem: Patient Care Overview  Goal: Plan of Care/Patient Progress Review  Outcome: Improving  2534-0730: VSS. Afebrile. Sats above 92% on 3LPM via NC. SCALES. Using IS. Continues with incontinent diarrhea stools (incontinent due to urgency and limited mobility due to pain). Scheduled Lomotil restarted and given. Mag, Potassium and Phos replaced; rechecks ordered with AM labs. TPN infusing. Continues on q6hr BG checks with TPN (111 and 108). Tylenol and Tramadol given for abdominal tightness. Abdomen is distended and taut. Compression sleeves on bilat legs. Wound care completed per order to to areas of radiation burn and excoriation on rectal, elfego area. Area is improving. Joiner patent with adequate output. Eating vegetable broth, popsicle and jello. MMW given per pt request with oral cares. Was up to the commode once this shift. Refused repositioning or pillows for elevation to legs. Possible d/c to TCU 4/2 or 4/3. Cont POC.

## 2018-04-01 NOTE — PLAN OF CARE
Problem: Patient Care Overview  Goal: Plan of Care/Patient Progress Review  Outcome: No Change    4825-2235: VSS. Afebrile. Continues with rib pain/abdominal tightness. PRN PO Tylenol and PO Tramadol given x2. Denied nausea and vomiting. O2 on via NC at 3 LPM with saturations >92%. TPN infusing at 40 mL/hr. BG checks Q6h with TPN. BG was 110 and next one with AM labs. Joiner patent with adequate output. No diarrhea on shift. Compression sleeves off of BLE per pt's request. Wound care complete to areas of radiation burn and excoriation on rectum and perineum. MMW given x2 per pt's request with oral cares. Multiple popsicles given throughout the shift. Patient up majority of the night and on the call light. Patient repositioning self in bed. Possible discharge to TCU on 4/2 or 4/3. Continue with POC.

## 2018-04-01 NOTE — PLAN OF CARE
Problem: Patient Care Overview  Goal: Plan of Care/Patient Progress Review  Outcome: No Change  8571-4160: VSS. Afebrile. Oxygen weaned to 1LPM via NC. Sat-ting above 92%. Denies nausea. Tramadol and tylenol given twice for rib, elfego-area and buttock pain. Phos and magnesium replaced; recheck with AM labs. Pt only had one diarrheal stool today. Continues on scheduled Lomotil. Abdomen a little more distended and firm today. Pt more tired today; did not sleep last night. Continuous TPN infusing. BG were 94 and 109. Q6hr BG checks finished; now checked daily. Joiner patent. Cares completed. Pt ate popsicles, jello and broth today. Hoping to try a regular diet soon. Possible d/c to TCU early this week. Pt refused compression stockings. Cont POC.

## 2018-04-02 ENCOUNTER — APPOINTMENT (OUTPATIENT)
Dept: GENERAL RADIOLOGY | Facility: CLINIC | Age: 78
DRG: 393 | End: 2018-04-02
Attending: PHYSICIAN ASSISTANT
Payer: MEDICARE

## 2018-04-02 ENCOUNTER — APPOINTMENT (OUTPATIENT)
Dept: OCCUPATIONAL THERAPY | Facility: CLINIC | Age: 78
DRG: 393 | End: 2018-04-02
Payer: MEDICARE

## 2018-04-02 ENCOUNTER — APPOINTMENT (OUTPATIENT)
Dept: PHYSICAL THERAPY | Facility: CLINIC | Age: 78
DRG: 393 | End: 2018-04-02
Payer: MEDICARE

## 2018-04-02 LAB
ALBUMIN SERPL-MCNC: 1 G/DL (ref 3.4–5)
ALBUMIN UR-MCNC: 30 MG/DL
ALBUMIN UR-MCNC: ABNORMAL MG/DL
ALP SERPL-CCNC: 90 U/L (ref 40–150)
ALT SERPL W P-5'-P-CCNC: 14 U/L (ref 0–50)
ANION GAP SERPL CALCULATED.3IONS-SCNC: 3 MMOL/L (ref 3–14)
APPEARANCE UR: ABNORMAL
APPEARANCE UR: ABNORMAL
APTT PPP: 34 SEC (ref 22–37)
AST SERPL W P-5'-P-CCNC: 19 U/L (ref 0–45)
BACTERIA #/AREA URNS HPF: ABNORMAL /HPF
BASOPHILS # BLD AUTO: 0 10E9/L (ref 0–0.2)
BASOPHILS NFR BLD AUTO: 0 %
BILIRUB SERPL-MCNC: 0.3 MG/DL (ref 0.2–1.3)
BILIRUB UR QL STRIP: ABNORMAL
BILIRUB UR QL STRIP: NEGATIVE
BUN SERPL-MCNC: 15 MG/DL (ref 7–30)
CALCIUM SERPL-MCNC: 7.4 MG/DL (ref 8.5–10.1)
CHLORIDE SERPL-SCNC: 102 MMOL/L (ref 94–109)
CO2 SERPL-SCNC: 31 MMOL/L (ref 20–32)
COLOR UR AUTO: ABNORMAL
COLOR UR AUTO: YELLOW
CREAT SERPL-MCNC: 0.45 MG/DL (ref 0.52–1.04)
DIFFERENTIAL METHOD BLD: ABNORMAL
EOSINOPHIL # BLD AUTO: 0 10E9/L (ref 0–0.7)
EOSINOPHIL NFR BLD AUTO: 0 %
ERYTHROCYTE [DISTWIDTH] IN BLOOD BY AUTOMATED COUNT: 15.1 % (ref 10–15)
GFR SERPL CREATININE-BSD FRML MDRD: >90 ML/MIN/1.7M2
GLUCOSE SERPL-MCNC: 94 MG/DL (ref 70–99)
GLUCOSE UR STRIP-MCNC: ABNORMAL MG/DL
GLUCOSE UR STRIP-MCNC: NEGATIVE MG/DL
HCT VFR BLD AUTO: 32.3 % (ref 35–47)
HGB BLD-MCNC: 10.3 G/DL (ref 11.7–15.7)
HGB UR QL STRIP: ABNORMAL
HGB UR QL STRIP: ABNORMAL
HYALINE CASTS #/AREA URNS LPF: 3 /LPF (ref 0–2)
INR PPP: 1.11 (ref 0.86–1.14)
KETONES UR STRIP-MCNC: ABNORMAL MG/DL
KETONES UR STRIP-MCNC: NEGATIVE MG/DL
LEUKOCYTE ESTERASE UR QL STRIP: ABNORMAL
LEUKOCYTE ESTERASE UR QL STRIP: ABNORMAL
LYMPHOCYTES # BLD AUTO: 0.1 10E9/L (ref 0.8–5.3)
LYMPHOCYTES NFR BLD AUTO: 0.9 %
MAGNESIUM SERPL-MCNC: 2 MG/DL (ref 1.6–2.3)
MCH RBC QN AUTO: 29.9 PG (ref 26.5–33)
MCHC RBC AUTO-ENTMCNC: 31.9 G/DL (ref 31.5–36.5)
MCV RBC AUTO: 94 FL (ref 78–100)
METAMYELOCYTES # BLD: 0.1 10E9/L
METAMYELOCYTES NFR BLD MANUAL: 1.8 %
MONOCYTES # BLD AUTO: 0.2 10E9/L (ref 0–1.3)
MONOCYTES NFR BLD AUTO: 2.6 %
MYELOCYTES # BLD: 0.2 10E9/L
MYELOCYTES NFR BLD MANUAL: 2.6 %
NEUTROPHILS # BLD AUTO: 5.8 10E9/L (ref 1.6–8.3)
NEUTROPHILS NFR BLD AUTO: 92.1 %
NITRATE UR QL: ABNORMAL
NITRATE UR QL: NEGATIVE
PH UR STRIP: 5.5 PH (ref 5–7)
PH UR STRIP: ABNORMAL PH (ref 5–7)
PHOSPHATE SERPL-MCNC: 2.5 MG/DL (ref 2.5–4.5)
PLATELET # BLD AUTO: 274 10E9/L (ref 150–450)
PLATELET # BLD EST: ABNORMAL 10*3/UL
POTASSIUM SERPL-SCNC: 4.4 MMOL/L (ref 3.4–5.3)
PREALB SERPL IA-MCNC: 7 MG/DL (ref 15–45)
PROT SERPL-MCNC: 4.2 G/DL (ref 6.8–8.8)
RBC # BLD AUTO: 3.44 10E12/L (ref 3.8–5.2)
RBC #/AREA URNS AUTO: 27 /HPF (ref 0–2)
RBC #/AREA URNS AUTO: ABNORMAL /HPF (ref 0–2)
RBC MORPH BLD: NORMAL
SODIUM SERPL-SCNC: 137 MMOL/L (ref 133–144)
SOURCE: ABNORMAL
SOURCE: ABNORMAL
SP GR UR STRIP: 1.02 (ref 1–1.03)
SP GR UR STRIP: ABNORMAL (ref 1–1.03)
TRIGL SERPL-MCNC: 150 MG/DL
UROBILINOGEN UR STRIP-MCNC: ABNORMAL MG/DL (ref 0–2)
UROBILINOGEN UR STRIP-MCNC: NORMAL MG/DL (ref 0–2)
WBC # BLD AUTO: 6.3 10E9/L (ref 4–11)
WBC #/AREA URNS AUTO: 40 /HPF (ref 0–5)
WBC #/AREA URNS AUTO: ABNORMAL /HPF
YEAST #/AREA URNS HPF: ABNORMAL /HPF

## 2018-04-02 PROCEDURE — 40000193 ZZH STATISTIC PT WARD VISIT

## 2018-04-02 PROCEDURE — 85025 COMPLETE CBC W/AUTO DIFF WBC: CPT | Performed by: INTERNAL MEDICINE

## 2018-04-02 PROCEDURE — 25000125 ZZHC RX 250: Performed by: PHYSICIAN ASSISTANT

## 2018-04-02 PROCEDURE — 99232 SBSQ HOSP IP/OBS MODERATE 35: CPT | Performed by: INTERNAL MEDICINE

## 2018-04-02 PROCEDURE — 25000128 H RX IP 250 OP 636: Performed by: INTERNAL MEDICINE

## 2018-04-02 PROCEDURE — A9270 NON-COVERED ITEM OR SERVICE: HCPCS | Mod: GY | Performed by: NURSE PRACTITIONER

## 2018-04-02 PROCEDURE — 80053 COMPREHEN METABOLIC PANEL: CPT | Performed by: INTERNAL MEDICINE

## 2018-04-02 PROCEDURE — A9270 NON-COVERED ITEM OR SERVICE: HCPCS | Mod: GY | Performed by: INTERNAL MEDICINE

## 2018-04-02 PROCEDURE — 25000132 ZZH RX MED GY IP 250 OP 250 PS 637: Mod: GY | Performed by: INTERNAL MEDICINE

## 2018-04-02 PROCEDURE — 71045 X-RAY EXAM CHEST 1 VIEW: CPT

## 2018-04-02 PROCEDURE — 25000128 H RX IP 250 OP 636: Performed by: PHYSICIAN ASSISTANT

## 2018-04-02 PROCEDURE — 97110 THERAPEUTIC EXERCISES: CPT | Mod: GP

## 2018-04-02 PROCEDURE — 84100 ASSAY OF PHOSPHORUS: CPT | Performed by: INTERNAL MEDICINE

## 2018-04-02 PROCEDURE — 40000133 ZZH STATISTIC OT WARD VISIT

## 2018-04-02 PROCEDURE — 97535 SELF CARE MNGMENT TRAINING: CPT | Mod: GO

## 2018-04-02 PROCEDURE — 12000008 ZZH R&B INTERMEDIATE UMMC

## 2018-04-02 PROCEDURE — 85730 THROMBOPLASTIN TIME PARTIAL: CPT | Performed by: INTERNAL MEDICINE

## 2018-04-02 PROCEDURE — 36592 COLLECT BLOOD FROM PICC: CPT | Performed by: INTERNAL MEDICINE

## 2018-04-02 PROCEDURE — 74019 RADEX ABDOMEN 2 VIEWS: CPT

## 2018-04-02 PROCEDURE — 83735 ASSAY OF MAGNESIUM: CPT | Performed by: INTERNAL MEDICINE

## 2018-04-02 PROCEDURE — 25000125 ZZHC RX 250: Performed by: INTERNAL MEDICINE

## 2018-04-02 PROCEDURE — 84478 ASSAY OF TRIGLYCERIDES: CPT | Performed by: INTERNAL MEDICINE

## 2018-04-02 PROCEDURE — 87086 URINE CULTURE/COLONY COUNT: CPT | Performed by: INTERNAL MEDICINE

## 2018-04-02 PROCEDURE — 85610 PROTHROMBIN TIME: CPT | Performed by: INTERNAL MEDICINE

## 2018-04-02 PROCEDURE — 87106 FUNGI IDENTIFICATION YEAST: CPT | Performed by: INTERNAL MEDICINE

## 2018-04-02 PROCEDURE — 81001 URINALYSIS AUTO W/SCOPE: CPT | Performed by: PHYSICIAN ASSISTANT

## 2018-04-02 PROCEDURE — 84134 ASSAY OF PREALBUMIN: CPT | Performed by: INTERNAL MEDICINE

## 2018-04-02 PROCEDURE — 25000132 ZZH RX MED GY IP 250 OP 250 PS 637: Mod: GY | Performed by: NURSE PRACTITIONER

## 2018-04-02 RX ORDER — LOPERAMIDE HCL 2 MG
2 CAPSULE ORAL 4 TIMES DAILY PRN
Status: DISCONTINUED | OUTPATIENT
Start: 2018-04-02 | End: 2018-04-02

## 2018-04-02 RX ADMIN — ACETAMINOPHEN 650 MG: 325 TABLET, FILM COATED ORAL at 07:59

## 2018-04-02 RX ADMIN — ENOXAPARIN SODIUM 40 MG: 40 INJECTION SUBCUTANEOUS at 20:29

## 2018-04-02 RX ADMIN — HYDROCORTISONE: 25 CREAM TOPICAL at 20:32

## 2018-04-02 RX ADMIN — DIPHENOXYLATE HYDROCHLORIDE AND ATROPINE SULFATE 1 TABLET: 2.5; .025 TABLET ORAL at 16:03

## 2018-04-02 RX ADMIN — ACETAMINOPHEN 650 MG: 325 TABLET, FILM COATED ORAL at 03:35

## 2018-04-02 RX ADMIN — POTASSIUM CHLORIDE: 2 INJECTION, SOLUTION, CONCENTRATE INTRAVENOUS at 20:32

## 2018-04-02 RX ADMIN — TRAMADOL HYDROCHLORIDE 50 MG: 50 TABLET, COATED ORAL at 03:35

## 2018-04-02 RX ADMIN — DIPHENHYDRAMINE HYDROCHLORIDE AND LIDOCAINE HYDROCHLORIDE AND ALUMINUM HYDROXIDE AND MAGNESIUM HYDRO 10 ML: KIT at 17:24

## 2018-04-02 RX ADMIN — TRAMADOL HYDROCHLORIDE 50 MG: 50 TABLET, COATED ORAL at 09:39

## 2018-04-02 RX ADMIN — DIPHENOXYLATE HYDROCHLORIDE AND ATROPINE SULFATE 1 TABLET: 2.5; .025 TABLET ORAL at 07:59

## 2018-04-02 RX ADMIN — Medication 1 TABLET: at 18:36

## 2018-04-02 RX ADMIN — POTASSIUM PHOSPHATE, MONOBASIC AND POTASSIUM PHOSPHATE, DIBASIC 10 MMOL: 224; 236 INJECTION, SOLUTION INTRAVENOUS at 13:07

## 2018-04-02 RX ADMIN — HYDROCORTISONE: 25 CREAM TOPICAL at 07:59

## 2018-04-02 RX ADMIN — I.V. FAT EMULSION 250 ML: 20 EMULSION INTRAVENOUS at 20:32

## 2018-04-02 ASSESSMENT — PAIN DESCRIPTION - DESCRIPTORS
DESCRIPTORS: ACHING;CONSTANT
DESCRIPTORS: ACHING;TIGHTNESS

## 2018-04-02 NOTE — PROGRESS NOTES
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 She has completed chemotherapy and is currently undergoing radiation. Palliative clinical social work had been consulted for assessment of coping and overall goals of care discussion. Her mood improved last week and her goals were clear.     Visit summary: Today I followed up with elizabeth to assess the need for ongoing follow up. She was talkative and said several times that she found it helpful to talk about \"the psychological\" and the effects on her spirit. She reported also finding it helpful to discuss \"the sequencing of events\" and what to expect for the rest of her day. She expressed some concern regarding working with therapies toward a common goal and conserving energy.    I started to assess pain and how this might be limiting her therapy participation yet other concerns were more pressing today.    Relevant Symptoms/Concerns  - New information since last visit   Physical:  Concerned about bowel movements, does feel this is slightly improving. Concerned about inability to sleep at night due to the \"excrutiating pain\" and needing to be cleaned up several times.   Psychological/Emotional/Existential:  She does admit that \"3 or 4 times since I've been here\" her spirit has been low and she was \"not sure I could make it through.\" She find the support of staff to be empowering and helpful. She says \"each times someone has held me and I have gotten through.\" She appreciates the ongoing support and encouragement of staff.   Family/Social/Caregiver:       Developmental:    Mental Health: She is looking for psychological ways to work through the pain.     End of Life:       Cultural/Congregational/Spiritual:      Grief/Loss:      Concurrent Stressors:        Comments:       Strengths - New " "information since last visit    Physical:     Psychological/Emotional/Existential:  She tells me today that she hopes to live until age 78 and her landlord recently expressed that she should \"aim for 120\" which she found humorous and uplifting.   Family/Social/Caregiver:  She said that her sister, Sahil, and brother in law, Hugo, were able to bring some things from home for her. She then describes several bags of items (one containing financial information and another containing what sounds like a blank health care directive) which she hopes to have energy to address at some point.   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 She showed me how their is pressure around her diaphragm which is often relieved by belching. This helps her relax even more.   End of Life:      Cultural/Rastafarian/Spiritual:      Grief/Loss:         Comments:       Goals/Decision Making/Advance Care Planning - New information since last visit   Preferences:  Today she tells me the plan to go to a rehab facility at some point. She remembers that \"[facility]\" are working on this.   Concerns:      Documents:      Decision Making Issues:        Comments:       Resource Needs     Discharge Planning:  Per Unit/Program  and/or Care Coordinator   Other:      Comments:       Intervention (Check all that apply)    X   Assessment of palliative specific issues          Introduction of Palliative clinical social work interventions        Adjustment to illness counseling        Advanced care planning        Attended/participated in care conference        Behavioral interventions for symptom management    X   Facilitation of processing of thoughts/feelings        Family " "communication facilitated        Grief counseling        Goals of care discussion/facilitation        Life legacy work        Life review facilitation        Psychoeducation        Re-framing        Resource referral        X   Other - reflective listening     Comments:  Today Elizabeth was talkative. Most of the visit consisted of reflective listening as Elizabeth \"sequenced\" the events of the recent days and upcoming night. She finds it helpful to create these plans.     Plan:  I will continue assessment/intervention as indicated. I will plan to follow up at the end of the week if able. I will not be in the hospital Wed 4/4.    JEFF Adrian, [facility]  Palliative Care Clinical   Pager [phone]    [facility] Inpatient Team Consult pager [phone] (M-F 8-4:30)  After-hours Answering Service [phone]     "

## 2018-04-02 NOTE — PLAN OF CARE
Problem: Patient Care Overview  Goal: Plan of Care/Patient Progress Review  Discharge Planner PT   Patient plan for discharge: TCU  Current status: Pt declining OOB activity, agreeable to supine LE exercises. Continues to require redirection to task as pt very talkative. Edu on benefits of OOB activity. Agreeable to OOB activity and trial ambulation tomorrow w/ therapy   Barriers to return to prior living situation: TCU  Recommendations for discharge: Pain, strength, endurance  Rationale for recommendations: TCU to improve functional mobility for safe return to home          Entered by: Chantal Becerril 04/02/2018 4:35 PM

## 2018-04-02 NOTE — PLAN OF CARE
Problem: Patient Care Overview  Goal: Plan of Care/Patient Progress Review  7D Edema: Pt had doffed comperm earlier for comfort.  Skin check performed.  Pt continues to have reduction in limb volume however still has 2+ pitting in B LE.  Pt reporting stockings getting wrinkled and causing discomfort.  Reviewed keeping garments smooth and asking for assist if needed.  LEs washed and lotioned.  Comperm donned from MPTs to knee creases with extra fabric folded over at distal end.  Patient care order in place; nursing to continue with edema management.  Edema team signing off.

## 2018-04-02 NOTE — PLAN OF CARE
Problem: Patient Care Overview  Goal: Plan of Care/Patient Progress Review  Outcome: No Change    1900-0730: VSS. Afebrile. Oxygen on via NC at 1 LPM with saturations >92%. Continues with rib/abdomen, perineum and buttock pain. PRN PO Tylenol and Tramadol given x2 (Q6h) with relief. Denied nausea and vomiting. TPN infusing at 40 mL/hr. Joiner patent with good output. Five loose/watery, incontinent stools on shift. Patient on scheduled Lomotil. Wound care complete to areas of radiation burn and excoriation on perineum and rectum after each stool incontinence. BLE edema present. Compression sleeves off due to pt's request. MMW given per pt's request with oral cares. Patient repositions self in bed. Port needle and dressing to be changed today. Potential discharge today or tomorrow to TCU with TPN. Slept between cares. Continue with POC.

## 2018-04-02 NOTE — PROGRESS NOTES
[facility], [place]    Hematology / Oncology Progress Note    Date of Service (when I saw the patient): 04/02/2018     Assessment & Plan   Elizabeth Taylor is a 77-year-old female with stage IIIA (T2 N1) SCC of anus with recent completion of concurrent 5-FU/Mitomycin + XRT who is admitted with intractable diarrhea, nausea, weakness, and dehydration. Hospital course has been complicated by a SBO for which pt was started on TPN. Has developed deconditioning and is now awaiting TCU placement while managing acute issues.      #Stage IIIA (T2 N1) SCC of anus  Followed by Dr. Ray. S/p 1 cycle 5-FU + mitomycin (D1 2/12) followed by 1 cycle single-agent 5-FU (d/t toxicity and age, D29 3/11) with concurrent radiation (completed 3/23).   - Supportive rectal cares ordered  - Intermittently expressing feelings of hopelessness and feeling at peace with dying, not wanting to continue with treatment. Consulted palliative care SW for assistance with illness coping and decision making, appreciate their assistance. Feeling more hopeful now that she has completed XRT, will continue supportive cares, she has f/u appt with Dr. Ray on 4/6/18.     Malignancy/Treatment related (chemotherapy/XRT) complications:    #Intractable diarrhea  #Nausea  #Dehydration  #Electrolyte derangements  Suspect nausea, diarrhea secondary to mucositis from recent chemotherapy (5-FU) and XRT. C diff & enteric panel were negative.  Likely her anal sphincter tone is compromised, and incontinence will continue to be an issue. Course has been complicated by SBO (see below).   - Continue Lomotil 1 tab QID (x3/31). Added Imodium prn.   - Antiemetics also available (Zofran, Compazine). No vomiting since admission.  - High lyte SS.    #Hypoxemia  #Hydropneumothorax, right side, likely secondary to fractured ribs  #Hydrothorax, left side  Stable respiratory status on ~1L NC likely 2/2 to b/l Pleural effusions. CXR on 3/29 with trace right  apical pneumothorax. Moderate layering bilateral pleural effusions with bilateral lower lobes and left retrocardiac opacities, atelectasis vs consolidation. Pneumothorax likely from fall resulting in rib fractures (see below).   - Supplemental oxygenation to keep O2 >90%  - Repeat CXR 4/2 with reduced lung sounds on exam    #Small bowel obstruction, resolved  AXR 3/27 for NG tube confirmation noted concern for possible SBO. CT CAP 3/28 shows SBO. TPN started 3/29. No evidence of nausea, but does have worsening abdominal distention. Tolerated CLD 4/1 with popsicles and broth.   - Repeat KUB 4/2 with worsening abdominal distension  - With pt still having persistent diarrhea, will continue antidiarrheals, but monitor closely   - Continue CLD + TPN    #Perianal wound, secondary to radiation  - Pain controlled with tylenol and tramadol PRN   - Appreciate C assistance in management of this wound  - Continue TPN given concern for compromised wound healing after XRT with malnutrition     #Pancytopenia  -Secondary to underlying disease, recent chemo/XRT  -Transfuse for Hgb <8, PLT <10K - no transfusion needs thus far     #Weakness  #Fall at home  #Fractured R 8th, 9th and 10th rib  Suspect weakness, falls secondary to above issues.  - Fall precautions  - PT/OT --> recommending PT/OT.   - Continue Tylenol, ketamine/gabapentin/lidocaine and diclofenac available topically.     #Malnutrition, in context of acute on chronic illness  % Intake: </= 50% for >/= 5 days (severe); Subcutaneous Fat Loss: Facial region: Moderate, Upper arm and Thoracic/intercostal: Severe   - Continue TPN (3/29-present). Labs daily.      #Coagulopathy   R/t severe malnutrition & infection (UTI). S/p PO vitamin K 5 mg (3/19, 3/20, 3/21, 2x on 3/22, 3/23 and 3/27 (for INR of 1.87). Now improved.   - Daily INR     #Catheter associated UTI (Klebsiella pneumoniae & proteus mirabilis)  Pt has Joiner catheter. Completed 10 day course of Cipro 500mg BID  (x3/21-3/30). Repeat UA continues to be dirty with hyaline casts, didn't reflex to culture.  - Joiner was changed 4/2 with outpt appearing cloudy/dark. Will repeat UA/UC.      #Altered mental status   Noted to be disoriented. 3/24 AM. Was getting concurrent oxycodone/opium tincture, which has held. Mental status has now improved, so likely 2/2 medications.     #Pain Assessment.  Current Pain Score 4/2/2018   Patient currently in pain? yes   Pain score (0-10) -   Pain location Other (Comment)   Pain descriptors Aching;Matthew Johnson is experiencing pain due to fractured ribs, radiation treatment. Pain management was discussed and the plan was created in a collaborative fashion.  Elizabeth's response to the current recommendations: engaged  compliant  - Opioid regimen: tramadol and tylenol PRN (oxycodone and opiod tincture caused ANS)   - Pharmacologic adjuvants: topical diclofenac gel, topical ketamine/gabapentin/lidocaine     FEN  - TPN (x3/29) @ 40 mL/hr  - CLD  - IVF bolus prn    PPx  - VTE: Lovenox 40 mg QD  - GI: Lomotil 1 tab QID, prn Imodium QID    Code status: Full code    Dispo: PT/OT recommending TCU. SW working with friend on facility. Likely medically ready to d/c 4/3 or 4/4.     Above plan discussed with staff physician, Dr. Kendall Gurrola PA-C  Hematology/Oncology  Pager: [phone]    Interval History   Diarrhea is persistent this morning. Feels more abdominal distension. No nausea. Still having pain around her ribs, but it is managed with her current pain regimen. States her LE edema is much improved from prior. No fever, cough, SOB, abdominal pain.     Physical Exam   Temp: 98.2  F (36.8  C) Temp src: Oral BP: 111/65   Heart Rate: 92 Resp: 20 SpO2: 92 % O2 Device: Nasal cannula Oxygen Delivery: 1 LPM  Vitals:    03/23/18 1652 03/29/18 0900 03/31/18 1156   Weight: 62.9 kg (138 lb 11.2 oz) 58.4 kg (128 lb 12.8 oz) 60.7 kg (133 lb 13.1 oz)     Vital Signs with Ranges  Temp:  [96.1  F (35.6   C)-99.2  F (37.3  C)] 98.2  F (36.8  C)  Heart Rate:  [78-92] 92  Resp:  [16-20] 20  BP: (103-120)/(59-74) 111/65  SpO2:  [91 %-95 %] 92 %  I/O last 3 completed shifts:  In: 1330 [P.O.:90; I.V.:320]  Out: 725 [Urine:725]    Constitutional: Pleasant female seen laying in bed. Appears frail and deconditioned.  Eyes: Lids and lashes normal, sclera clear, conjunctiva normal.  ENT: Normocephalic, oral pharynx with moist mucus membranes, tonsils without erythema or exudates, gums normal and good dentition.   Respiratory: No increased work of breathing, good air exchange, clear to auscultation bilaterally, no crackles or wheezing.  Cardiovascular: Regular rate and rhythm, normal S1 and S2, and no murmur noted.  GI: No masses or scars. +BS. Soft. No tenderness on palpation.  Skin: Scattered bruising. No bleeding, no rashes, no lesions, no jaundice.  Extremities: There is no redness, warmth, or swelling of the joints. 2+ b/l lower extremity edema. No cyanosis.  Neurologic: Awake, alert, oriented to name, place and time.      Medications     parenteral nutrition - ADULT compounded formula       parenteral nutrition - ADULT compounded formula 40 mL/hr at 04/01/18 2002     IV fluid REPLACEMENT ONLY       - MEDICATION INSTRUCTIONS -         diphenoxylate-atropine  1 tablet Oral 4x Daily     lipids  250 mL Intravenous Once per day on Mon Tue Wed Thu Fri     heparin lock flush  5-10 mL Intracatheter Q24H     heparin  5 mL Intracatheter Q28 Days     calcium-vitamin D  1 tablet Oral Daily     multivitamin, therapeutic  1 tablet Oral Daily     hydrocortisone   Rectal BID     vitamin B complex with vitamin C  1 tablet Oral Daily     enoxaparin  40 mg Subcutaneous Q24H       Data   ROUTINE IP LABS (Last four results)  BMP  Recent Labs  Lab 04/02/18  0633 04/01/18  0554 03/31/18  0625 03/30/18  0646    136 136 137   POTASSIUM 4.4 4.3 3.8 3.7   CHLORIDE 102 102 103 102   ELISE 7.4* 7.2* 7.2* 7.7*   CO2 31 32 30 30   BUN 15 12 11 9    CR 0.45* 0.40* 0.42* 0.52   GLC 94 99 112* 118*     CBC  Recent Labs  Lab 04/02/18  0633 04/01/18  0554 03/31/18  0625 03/30/18  0646   WBC 6.3 5.4 5.4 5.6   RBC 3.44* 3.39* 3.50* 3.43*   HGB 10.3* 10.2* 10.5* 10.3*   HCT 32.3* 31.4* 32.9* 31.8*   MCV 94 93 94 93   MCH 29.9 30.1 30.0 30.0   MCHC 31.9 32.5 31.9 32.4   RDW 15.1* 15.1* 15.0 15.0    241 220 222     INR  Recent Labs  Lab 04/02/18  0633 04/01/18  0554 03/31/18  0625 03/30/18  0646   INR 1.11 1.11 1.07 1.28*

## 2018-04-02 NOTE — PLAN OF CARE
Problem: Patient Care Overview  Goal: Plan of Care/Patient Progress Review  Outcome: No Change  .farias replaced today. Sent ua/uc. Diarrhea stool x2 this shift.good pain control with current pain meds.replaced phos as ordered.tpn civi.

## 2018-04-02 NOTE — PLAN OF CARE
Problem: Patient Care Overview  Goal: Plan of Care/Patient Progress Review  OT 7D: Pt with other providers this AM and with PT this PM. Per PT, pt declining OT following PT intervention. Will reschedule for tomorrow

## 2018-04-03 ENCOUNTER — APPOINTMENT (OUTPATIENT)
Dept: PHYSICAL THERAPY | Facility: CLINIC | Age: 78
DRG: 393 | End: 2018-04-03
Payer: MEDICARE

## 2018-04-03 ENCOUNTER — APPOINTMENT (OUTPATIENT)
Dept: OCCUPATIONAL THERAPY | Facility: CLINIC | Age: 78
DRG: 393 | End: 2018-04-03
Payer: MEDICARE

## 2018-04-03 LAB
ALBUMIN SERPL-MCNC: 1 G/DL (ref 3.4–5)
ALP SERPL-CCNC: 82 U/L (ref 40–150)
ALT SERPL W P-5'-P-CCNC: 15 U/L (ref 0–50)
ANION GAP SERPL CALCULATED.3IONS-SCNC: 5 MMOL/L (ref 3–14)
AST SERPL W P-5'-P-CCNC: 20 U/L (ref 0–45)
BASOPHILS # BLD AUTO: 0.1 10E9/L (ref 0–0.2)
BASOPHILS NFR BLD AUTO: 1.8 %
BILIRUB SERPL-MCNC: 0.5 MG/DL (ref 0.2–1.3)
BUN SERPL-MCNC: 13 MG/DL (ref 7–30)
CALCIUM SERPL-MCNC: 7.4 MG/DL (ref 8.5–10.1)
CHLORIDE SERPL-SCNC: 104 MMOL/L (ref 94–109)
CO2 SERPL-SCNC: 29 MMOL/L (ref 20–32)
CREAT SERPL-MCNC: 0.47 MG/DL (ref 0.52–1.04)
DIFFERENTIAL METHOD BLD: ABNORMAL
EOSINOPHIL # BLD AUTO: 0.1 10E9/L (ref 0–0.7)
EOSINOPHIL NFR BLD AUTO: 0.9 %
ERYTHROCYTE [DISTWIDTH] IN BLOOD BY AUTOMATED COUNT: 15.2 % (ref 10–15)
GFR SERPL CREATININE-BSD FRML MDRD: >90 ML/MIN/1.7M2
GLUCOSE SERPL-MCNC: 107 MG/DL (ref 70–99)
HCT VFR BLD AUTO: 30.1 % (ref 35–47)
HGB BLD-MCNC: 9.6 G/DL (ref 11.7–15.7)
INR PPP: 1.18 (ref 0.86–1.14)
LYMPHOCYTES # BLD AUTO: 0.2 10E9/L (ref 0.8–5.3)
LYMPHOCYTES NFR BLD AUTO: 3.7 %
MAGNESIUM SERPL-MCNC: 1.9 MG/DL (ref 1.6–2.3)
MCH RBC QN AUTO: 30.1 PG (ref 26.5–33)
MCHC RBC AUTO-ENTMCNC: 31.9 G/DL (ref 31.5–36.5)
MCV RBC AUTO: 94 FL (ref 78–100)
MONOCYTES # BLD AUTO: 0 10E9/L (ref 0–1.3)
MONOCYTES NFR BLD AUTO: 0 %
MYELOCYTES # BLD: 0.1 10E9/L
MYELOCYTES NFR BLD MANUAL: 0.9 %
NEUTROPHILS # BLD AUTO: 6.2 10E9/L (ref 1.6–8.3)
NEUTROPHILS NFR BLD AUTO: 92.7 %
PHOSPHATE SERPL-MCNC: 2.6 MG/DL (ref 2.5–4.5)
PLATELET # BLD AUTO: 265 10E9/L (ref 150–450)
PLATELET # BLD EST: ABNORMAL 10*3/UL
POTASSIUM SERPL-SCNC: 4.2 MMOL/L (ref 3.4–5.3)
PROT SERPL-MCNC: 4 G/DL (ref 6.8–8.8)
RBC # BLD AUTO: 3.19 10E12/L (ref 3.8–5.2)
RBC MORPH BLD: NORMAL
SODIUM SERPL-SCNC: 138 MMOL/L (ref 133–144)
WBC # BLD AUTO: 6.7 10E9/L (ref 4–11)

## 2018-04-03 PROCEDURE — 25000125 ZZHC RX 250: Performed by: INTERNAL MEDICINE

## 2018-04-03 PROCEDURE — 36592 COLLECT BLOOD FROM PICC: CPT | Performed by: NURSE PRACTITIONER

## 2018-04-03 PROCEDURE — 97530 THERAPEUTIC ACTIVITIES: CPT | Mod: GO | Performed by: OCCUPATIONAL THERAPIST

## 2018-04-03 PROCEDURE — 25000125 ZZHC RX 250: Performed by: PHYSICIAN ASSISTANT

## 2018-04-03 PROCEDURE — 83735 ASSAY OF MAGNESIUM: CPT | Performed by: NURSE PRACTITIONER

## 2018-04-03 PROCEDURE — 97110 THERAPEUTIC EXERCISES: CPT | Mod: GP

## 2018-04-03 PROCEDURE — 97535 SELF CARE MNGMENT TRAINING: CPT | Mod: GO | Performed by: OCCUPATIONAL THERAPIST

## 2018-04-03 PROCEDURE — 97530 THERAPEUTIC ACTIVITIES: CPT | Mod: GP

## 2018-04-03 PROCEDURE — A9270 NON-COVERED ITEM OR SERVICE: HCPCS | Mod: GY | Performed by: NURSE PRACTITIONER

## 2018-04-03 PROCEDURE — 40000193 ZZH STATISTIC PT WARD VISIT

## 2018-04-03 PROCEDURE — 80053 COMPREHEN METABOLIC PANEL: CPT | Performed by: NURSE PRACTITIONER

## 2018-04-03 PROCEDURE — 25000128 H RX IP 250 OP 636: Performed by: INTERNAL MEDICINE

## 2018-04-03 PROCEDURE — 85025 COMPLETE CBC W/AUTO DIFF WBC: CPT | Performed by: NURSE PRACTITIONER

## 2018-04-03 PROCEDURE — 99232 SBSQ HOSP IP/OBS MODERATE 35: CPT | Performed by: INTERNAL MEDICINE

## 2018-04-03 PROCEDURE — 84100 ASSAY OF PHOSPHORUS: CPT | Performed by: NURSE PRACTITIONER

## 2018-04-03 PROCEDURE — 12000008 ZZH R&B INTERMEDIATE UMMC

## 2018-04-03 PROCEDURE — 25000128 H RX IP 250 OP 636: Performed by: PHYSICIAN ASSISTANT

## 2018-04-03 PROCEDURE — 40000133 ZZH STATISTIC OT WARD VISIT: Performed by: OCCUPATIONAL THERAPIST

## 2018-04-03 PROCEDURE — 25000132 ZZH RX MED GY IP 250 OP 250 PS 637: Mod: GY | Performed by: NURSE PRACTITIONER

## 2018-04-03 PROCEDURE — 85610 PROTHROMBIN TIME: CPT | Performed by: NURSE PRACTITIONER

## 2018-04-03 RX ORDER — SIMETHICONE 40MG/0.6ML
40 SUSPENSION, DROPS(FINAL DOSAGE FORM)(ML) ORAL EVERY 6 HOURS PRN
Status: DISCONTINUED | OUTPATIENT
Start: 2018-04-03 | End: 2018-04-04 | Stop reason: HOSPADM

## 2018-04-03 RX ORDER — LOPERAMIDE HCL 2 MG
2 CAPSULE ORAL 4 TIMES DAILY PRN
Status: DISCONTINUED | OUTPATIENT
Start: 2018-04-03 | End: 2018-04-04 | Stop reason: HOSPADM

## 2018-04-03 RX ADMIN — POTASSIUM PHOSPHATE, MONOBASIC AND POTASSIUM PHOSPHATE, DIBASIC 10 MMOL: 224; 236 INJECTION, SOLUTION INTRAVENOUS at 12:39

## 2018-04-03 RX ADMIN — HYDROCORTISONE: 25 CREAM TOPICAL at 20:01

## 2018-04-03 RX ADMIN — TRAMADOL HYDROCHLORIDE 50 MG: 50 TABLET, COATED ORAL at 04:14

## 2018-04-03 RX ADMIN — TRAMADOL HYDROCHLORIDE 50 MG: 50 TABLET, COATED ORAL at 20:18

## 2018-04-03 RX ADMIN — DIPHENHYDRAMINE HYDROCHLORIDE AND LIDOCAINE HYDROCHLORIDE AND ALUMINUM HYDROXIDE AND MAGNESIUM HYDRO 5 ML: KIT at 17:33

## 2018-04-03 RX ADMIN — Medication 1 TABLET: at 17:28

## 2018-04-03 RX ADMIN — DIPHENHYDRAMINE HYDROCHLORIDE AND LIDOCAINE HYDROCHLORIDE AND ALUMINUM HYDROXIDE AND MAGNESIUM HYDRO 5 ML: KIT at 04:14

## 2018-04-03 RX ADMIN — HYDROCORTISONE: 25 CREAM TOPICAL at 10:51

## 2018-04-03 RX ADMIN — POTASSIUM CHLORIDE: 2 INJECTION, SOLUTION, CONCENTRATE INTRAVENOUS at 20:01

## 2018-04-03 RX ADMIN — ENOXAPARIN SODIUM 40 MG: 40 INJECTION SUBCUTANEOUS at 20:01

## 2018-04-03 RX ADMIN — TRAMADOL HYDROCHLORIDE 50 MG: 50 TABLET, COATED ORAL at 10:51

## 2018-04-03 RX ADMIN — I.V. FAT EMULSION 250 ML: 20 EMULSION INTRAVENOUS at 19:59

## 2018-04-03 ASSESSMENT — PAIN DESCRIPTION - DESCRIPTORS
DESCRIPTORS: PRESSURE
DESCRIPTORS: PRESSURE
DESCRIPTORS: ACHING
DESCRIPTORS: ACHING

## 2018-04-03 NOTE — PLAN OF CARE
Problem: Patient Care Overview  Goal: Plan of Care/Patient Progress Review  Discharge Planner PT   Patient plan for discharge: not stated  Current status: Pt in a lot of pain throughout session with mm cramping and R rib cage pain. Pt stood at scale for several minutes. Pt sliding off EOB when sitting and need maxAx2/3 in order to safely get back into bed. Pt tolerated supine exercises well but needed frequent rest breaks.  Barriers to return to prior living situation: poor activity tolerance, pain need Ax2 for all mobility  Recommendations for discharge: TCU  Rationale for recommendations: To improve strength, endurance and IND with all functional mobility       Entered by: Crissy Neal 04/03/2018 4:46 PM

## 2018-04-03 NOTE — PROGRESS NOTES
"SPIRITUAL HEALTH SERVICES  SPIRITUAL ASSESSMENT Progress Note  [facility] ([place] 7D    REFERRAL SOURCE: reviewed documentation; length of stay    I met with Elizabeth and provided SH[facility] introduction and assessed spiritual needs. Elizabeth shared she had little energy to visit today but would like to \"hear about the big world out there.\" Reflective conversation was shared.     PLAN: I will notify unit  of care provided.     Miriam Tafoya   Intern  Pager [phone]    "

## 2018-04-03 NOTE — PROGRESS NOTES
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 Hospital course has been complicated by a SBO for which pt was started on TPN. Has developed deconditioning and is now awaiting TCU placement while managing acute issues.      #Stage IIIA (T2 N1) SCC of anus  Followed by Dr. Ray. S/p 1 cycle 5-FU + mitomycin (D1 2/12) followed by 1 cycle single-agent 5-FU (d/t toxicity and age, D29 3/11) with concurrent radiation (completed 3/23).   - Supportive rectal cares ordered  - Intermittently expressing feelings of hopelessness and feeling at peace with dying, not wanting to continue with treatment. Consulted palliative care SW for assistance with illness coping and decision making, appreciate their assistance. Feeling more hopeful now that she has completed XRT, will continue supportive cares, she has f/u appt with Dr. Ray on 4/6/18.     Malignancy/Treatment related (chemotherapy/XRT) complications:    #Intractable diarrhea  #Nausea  #Dehydration  #Electrolyte derangements  Suspect nausea, diarrhea secondary to mucositis from recent chemotherapy (5-FU) and XRT. C diff & enteric panel were negative.  Likely her anal sphincter tone is compromised, and incontinence will continue to be an issue. Course has been complicated by SBO (see below).   - Continue Lomotil 1 tab QID (x3/31). Added Imodium prn.   - Antiemetics also available (Zofran, Compazine). No vomiting since admission.  - High lyte SS.    #Hypoxemia  #Hydropneumothorax, right side, likely secondary to fractured ribs  #Hydrothorax, left side  Stable respiratory status on ~1L NC likely 2/2 to b/l Pleural effusions. CXR on 3/29 with trace right  apical pneumothorax. Moderate layering bilateral pleural effusions with bilateral lower lobes and left retrocardiac opacities, atelectasis vs consolidation. Pneumothorax likely from fall resulting in rib fractures (see below).   - Supplemental oxygenation to keep O2 >90%  - Repeat CXR 4/2 showing increased b/l pleural effusions and no change in atelectasis/consolidation. Suspect no benefit in a thoracentesis as with low albumin, fluid will continue to accumulate. Also stable on 1L and afebrile.    #Small bowel obstruction, resolved  AXR 3/27 for NG tube confirmation noted concern for possible SBO. CT CAP 3/28 shows SBO. TPN started 3/29. No evidence of nausea, but does have worsening abdominal distention. Tolerated CLD 4/1 with popsicles and broth.   - Repeat KUB 4/2 showing no change in small bowel distension from prior CT  - Diarrhea has improved, but is still experiencing abdominal cramping with bowel movements.   - Add Simethicone QID prn and Imodium prn  - Continue CLD + TPN    #Perianal wound, secondary to radiation  - Pain controlled with tylenol and tramadol PRN   - Appreciate [facility] assistance in management of this wound  - Continue TPN given concern for compromised wound healing after XRT with malnutrition     #Pancytopenia  -Secondary to underlying disease, recent chemo/XRT  -Transfuse for Hgb <8, PLT <10K - no transfusion needs thus far     #Weakness  #Fall at home  #Fractured R 8th, 9th and 10th rib  Suspect weakness, falls secondary to above issues.  - Fall precautions  - PT/OT --> recommending PT/OT.   - Continue Tylenol, ketamine/gabapentin/lidocaine and diclofenac available topically.     #Malnutrition, in context of acute on chronic illness  % Intake: </= 50% for >/= 5 days (severe); Subcutaneous Fat Loss: Facial region: Moderate, Upper arm and Thoracic/intercostal: Severe   - Continue TPN (3/29-present). Labs daily.      #Coagulopathy   R/t severe malnutrition & infection (UTI). S/p PO vitamin K 5 mg  (3/19, 3/20, 3/21, 2x on 3/22, 3/23 and 3/27 (for INR of 1.87). Now improved.   - Daily INR     #Catheter associated UTI (Klebsiella pneumoniae & proteus mirabilis)  Pt has Joiner catheter. Completed 10 day course of Cipro 500mg BID (x3/21-3/30). Repeat UA continues to be dirty with hyaline casts, didn't reflex to culture.  - Joiner was changed 4/2 with outpt appearing cloudy/dark. UA dirty. UC with >100K colonies yeast. Suspect this is a contaminant. Will hold off on antifungal unless pt becomes symptomatic or febrile.      #Altered mental status   Noted to be disoriented. 3/24 AM. Was getting concurrent oxycodone/opium tincture, which has held. Mental status has now improved, so likely 2/2 medications.     #Pain Assessment.  Current Pain Score 4/3/2018   Patient currently in pain? sleeping: patient not able to self report   Pain score (0-10) -   Pain location -   Pain descriptors -   Elizabeth is experiencing pain due to fractured ribs, radiation treatment. Pain management was discussed and the plan was created in a collaborative fashion.  Elizabeth's response to the current recommendations: engaged  compliant  - Opioid regimen: tramadol and tylenol PRN (oxycodone and opiod tincture caused ANS)   - Pharmacologic adjuvants: topical diclofenac gel, topical ketamine/gabapentin/lidocaine     FEN  - TPN (x3/29) @ 40 mL/hr  - CLD  - IVF bolus prn    PPx  - VTE: Lovenox 40 mg QD  - GI: prn Imodium QID    Code status: Full code    Dispo: PT/OT recommending TCU. SW working with friend on facility. Likely medically ready to d/c 4/3.     Above plan discussed with staff physician, Dr. Kendall Gurrola PA-C  Hematology/Oncology  Pager: [phone]    Interval History   Patient is feeling well this morning. Attributes it to her Joiner change. Continues to have abdominal distension. No nausea. Still having pain around her ribs, but it is managed with her current pain regimen. Ambitious about being more mobile today and  working with PT/OT. No fever, cough, SOB, abdominal pain.     Physical Exam   Temp: 99.9  F (37.7  C) Temp src: Oral BP: 125/66   Heart Rate: 87 Resp: 16 SpO2: 90 % O2 Device: None (Room air) Oxygen Delivery: 1 LPM  Vitals:    03/23/18 1652 03/29/18 0900 03/31/18 1156   Weight: 62.9 kg (138 lb 11.2 oz) 58.4 kg (128 lb 12.8 oz) 60.7 kg (133 lb 13.1 oz)     Vital Signs with Ranges  Temp:  [97.7  F (36.5  C)-100.1  F (37.8  C)] 99.9  F (37.7  C)  Heart Rate:  [87-94] 87  Resp:  [16-20] 16  BP: ()/(59-66) 125/66  SpO2:  [90 %-95 %] 90 %  I/O last 3 completed shifts:  In: 2914.93   Out: 1500 [Urine:1500]    Constitutional: Pleasant female seen laying in bed. Appears frail and deconditioned.  Eyes: Lids and lashes normal, sclera clear, conjunctiva normal.  ENT: Normocephalic, oral pharynx with moist mucus membranes, tonsils without erythema or exudates, gums normal and good dentition.   Respiratory: No increased work of breathing, good air exchange, clear to auscultation bilaterally, no crackles or wheezing.  Cardiovascular: Regular rate and rhythm, normal S1 and S2, and no murmur noted.  GI: No masses or scars. +BS. Soft. No tenderness on palpation.  Skin: Scattered bruising. No bleeding, no rashes, no lesions, no jaundice.  Extremities: There is no redness, warmth, or swelling of the joints. 2+ b/l lower extremity edema. No cyanosis.  Neurologic: Awake, alert, oriented to name, place and time.      Medications     parenteral nutrition - ADULT compounded formula       parenteral nutrition - ADULT compounded formula 40 mL/hr at 04/02/18 2032     IV fluid REPLACEMENT ONLY       - MEDICATION INSTRUCTIONS -         lipids  250 mL Intravenous Once per day on Mon Tue Wed Thu Fri     heparin lock flush  5-10 mL Intracatheter Q24H     heparin  5 mL Intracatheter Q28 Days     calcium-vitamin D  1 tablet Oral Daily     multivitamin, therapeutic  1 tablet Oral Daily     hydrocortisone   Rectal BID     vitamin B complex with  vitamin C  1 tablet Oral Daily     enoxaparin  40 mg Subcutaneous Q24H       Data   ROUTINE IP LABS (Last four results)  BMP    Recent Labs  Lab 04/03/18  0723 04/02/18  0633 04/01/18  0554 03/31/18  0625    137 136 136   POTASSIUM 4.2 4.4 4.3 3.8   CHLORIDE 104 102 102 103   ELISE 7.4* 7.4* 7.2* 7.2*   CO2 29 31 32 30   BUN 13 15 12 11   CR 0.47* 0.45* 0.40* 0.42*   * 94 99 112*     CBC    Recent Labs  Lab 04/03/18  0723 04/02/18  0633 04/01/18  0554 03/31/18  0625   WBC 6.7 6.3 5.4 5.4   RBC 3.19* 3.44* 3.39* 3.50*   HGB 9.6* 10.3* 10.2* 10.5*   HCT 30.1* 32.3* 31.4* 32.9*   MCV 94 94 93 94   MCH 30.1 29.9 30.1 30.0   MCHC 31.9 31.9 32.5 31.9   RDW 15.2* 15.1* 15.1* 15.0    274 241 220     INR    Recent Labs  Lab 04/03/18  0723 04/02/18  0633 04/01/18  0554 03/31/18  0625   INR 1.18* 1.11 1.11 1.07

## 2018-04-03 NOTE — PROGRESS NOTES
"Social Work Services Progress Note    Hospital Day: 15  Date of Initial Social Work Evaluation:  To be completed.  Collaborated with:  Patient, patient's friend Adamaris (cell [phone]; work [phone]), Brenda Gurrola PA-C, [place] admissions liaison Nicol (p: [phone])    Data:  SW consulted for discharge planning, PT/OT recommendations for TCU    Intervention:  Per Brenda Gurrola PA-C, anticipate that patient will be medically stable for discharge to TCU facility tomorrow, pending placement.    THOMAS spoke w/ [place] admissions liaison Nicol (p: [phone]) who reports that she has sent updated notes and MAR to [facility] to review, waiting to hear back from facility for final confirmation if able to accept patient tomorrow. SW awaiting call from [facility] for this confirmation.    SW received phone call from patient's friend Adamaris who patient has wanted involved in discharge planning process. SW gave update of possible discharge tomorrow pending acceptance at [facility], Adamaris is in agreement w/ this plan. Adamaris requests SW call her tomorrow w/ update on discharge plan.    SW met w/ patient at bedside to provide update on discharge planning. Patient glad to hear that Adamaris continues to check in w/ SW on discharge plan. Patient shares that she is concerned that she is \"not ready\" for discharge tomorrow due to a \"big setback\" she has had recently, requests to speak to hem/onc team about these concerns. SW will update Brenda Gurrola PA-C tomorrow w/ patient's concern re: discharge plan tomorrow. Patient aware that SW awaiting final confirmation from [facility], reports that she would be \"very happy\" going to that facility. Patient requested information on available transportation options to facility. SW explained difference between stretcher and wheelchair transport, explained that they could be private pay if not covered by patient's medical insurance. Patient shares concerns " "about her finances stating that she does not feel that she could afford stretcher transport if it is considerably more expensive than wheelchair transport, thinks that she would feel comfortable sitting in wheelchair for transport but \"wants to think about it tonight.\" Patient continues to tell this writer that she would be \"more on board with Thursday for leaving.\" SW explained to patient that if she is medically stable for discharge and a TCU placement has been secured then we would be looking at discharge tomorrow. Patient again requests to discuss her concerns w/ hem/onc team.    Assessment:  Patient remains agreeable w/ plan to discharge to TCU when medically stable, does share concerns about \"being ready\" tomorrow.    Plan:    Anticipated Disposition:  Facility:  Waiting for final confirmation from [facility] TCU if able to accept patient    Barriers to d/c plan:  Patient reports not feeling \"ready\" for discharge tomorrow, prefers Thursday.    Follow Up:  SW to continue following for discharge planning.    Peg Brunson, JEFF, KENYASW  7D Oncology SW  Phone [phone]  Pager [phone]        "

## 2018-04-03 NOTE — PLAN OF CARE
Problem: Patient Care Overview  Goal: Plan of Care/Patient Progress Review  Discharge Planner OT   Patient plan for discharge: not stated  Current status: Pt mod A supine> EOB, mod A STS, max A EOB> supine, VSS throughout and VC for deep breathing for pain mgmt throughout.   Barriers to return to prior living situation: medical status and pain  Recommendations for discharge: TCU  Rationale for recommendations: to increase ind in ADLS/IADLS       Entered by: Raina Valenzuela 04/03/2018 3:32 PM

## 2018-04-03 NOTE — PLAN OF CARE
Problem: Patient Care Overview  Goal: Plan of Care/Patient Progress Review  Outcome: Improving  Large loose stool x1 this am.perirectal area wound less edema. Severe pain after incontinent stool or cleaning.. tpn civi via port a cath.phos replacing now.afeb.vss.

## 2018-04-03 NOTE — PLAN OF CARE
Problem: Diarrhea (Adult)  Goal: Improved/Reduced Symptoms  Patient will demonstrate the desired outcomes by discharge/transition of care.   Outcome: No Change  Tmax: 99.0; 1L O2 NC. Tramadol x 1 for pain. Magic Mouth Wash x 1. Port needle changed this shift. TPN @ 40mL/hr; Lipids @ 20.8mL/hr.  Joiner intact, good UOP. Assist x 2 for cares. Will continue w/POC.

## 2018-04-03 NOTE — PLAN OF CARE
"Problem: Patient Care Overview  Goal: Plan of Care/Patient Progress Review  Outcome: No Change   04/02/18 3868   OTHER   Plan Of Care Reviewed With patient   Plan of Care Review   Progress no change     /59 (BP Location: Right arm)  Pulse 74  Temp 100.1  F (37.8  C) (Oral)  Resp 20  Ht 1.651 m (5' 5\")  Wt 60.7 kg (133 lb 13.1 oz)  SpO2 95%  BMI 22.27 kg/m2     Neuro: A&Ox4.   Cardiac: SR. VSS.   Respiratory: Sating 95% on one liter.  GI/:BM x2. Adequate urine output from the farias.  Diet/appetite: Tolerating clear liquids diet.  Activity:  Assist of two up to chair.  Pain: At acceptable level on current regimen.   Skin: Intact, no new deficits noted.  LDA's:PIV saline locked. The port a cath(Day RN will change the needle tomorrow per report) is infusing TPN/LIPID.   Plan: Continue with POC. Notify primary team with changes.      "

## 2018-04-04 VITALS
WEIGHT: 131.5 LBS | SYSTOLIC BLOOD PRESSURE: 123 MMHG | OXYGEN SATURATION: 92 % | DIASTOLIC BLOOD PRESSURE: 75 MMHG | RESPIRATION RATE: 18 BRPM | HEIGHT: 65 IN | HEART RATE: 99 BPM | BODY MASS INDEX: 21.91 KG/M2 | TEMPERATURE: 99.5 F

## 2018-04-04 LAB
ALBUMIN SERPL-MCNC: 1 G/DL (ref 3.4–5)
ALP SERPL-CCNC: 100 U/L (ref 40–150)
ALT SERPL W P-5'-P-CCNC: 27 U/L (ref 0–50)
ANION GAP SERPL CALCULATED.3IONS-SCNC: 5 MMOL/L (ref 3–14)
AST SERPL W P-5'-P-CCNC: 50 U/L (ref 0–45)
BACTERIA SPEC CULT: ABNORMAL
BACTERIA SPEC CULT: ABNORMAL
BASOPHILS # BLD AUTO: 0 10E9/L (ref 0–0.2)
BASOPHILS NFR BLD AUTO: 0 %
BILIRUB SERPL-MCNC: 0.3 MG/DL (ref 0.2–1.3)
BUN SERPL-MCNC: 13 MG/DL (ref 7–30)
CALCIUM SERPL-MCNC: 7.7 MG/DL (ref 8.5–10.1)
CHLORIDE SERPL-SCNC: 100 MMOL/L (ref 94–109)
CO2 SERPL-SCNC: 30 MMOL/L (ref 20–32)
CREAT SERPL-MCNC: 0.45 MG/DL (ref 0.52–1.04)
DIFFERENTIAL METHOD BLD: ABNORMAL
EOSINOPHIL # BLD AUTO: 0.1 10E9/L (ref 0–0.7)
EOSINOPHIL NFR BLD AUTO: 1 %
ERYTHROCYTE [DISTWIDTH] IN BLOOD BY AUTOMATED COUNT: 15.1 % (ref 10–15)
GFR SERPL CREATININE-BSD FRML MDRD: >90 ML/MIN/1.7M2
GLUCOSE SERPL-MCNC: 98 MG/DL (ref 70–99)
HCT VFR BLD AUTO: 30.2 % (ref 35–47)
HGB BLD-MCNC: 9.7 G/DL (ref 11.7–15.7)
INR PPP: 1.13 (ref 0.86–1.14)
LYMPHOCYTES # BLD AUTO: 0.2 10E9/L (ref 0.8–5.3)
LYMPHOCYTES NFR BLD AUTO: 2.1 %
Lab: ABNORMAL
MAGNESIUM SERPL-MCNC: 1.8 MG/DL (ref 1.6–2.3)
MCH RBC QN AUTO: 29.9 PG (ref 26.5–33)
MCHC RBC AUTO-ENTMCNC: 32.1 G/DL (ref 31.5–36.5)
MCV RBC AUTO: 93 FL (ref 78–100)
MONOCYTES # BLD AUTO: 0 10E9/L (ref 0–1.3)
MONOCYTES NFR BLD AUTO: 0 %
MYELOCYTES # BLD: 0.1 10E9/L
MYELOCYTES NFR BLD MANUAL: 1.1 %
NEUTROPHILS # BLD AUTO: 7 10E9/L (ref 1.6–8.3)
NEUTROPHILS NFR BLD AUTO: 94.7 %
PHOSPHATE SERPL-MCNC: 2.7 MG/DL (ref 2.5–4.5)
PLATELET # BLD AUTO: 302 10E9/L (ref 150–450)
PLATELET # BLD EST: ABNORMAL 10*3/UL
POIKILOCYTOSIS BLD QL SMEAR: SLIGHT
POTASSIUM SERPL-SCNC: 4 MMOL/L (ref 3.4–5.3)
PROMYELOCYTES # BLD MANUAL: 0.1 10E9/L
PROMYELOCYTES NFR BLD MANUAL: 1.1 %
PROT SERPL-MCNC: 4.4 G/DL (ref 6.8–8.8)
RBC # BLD AUTO: 3.24 10E12/L (ref 3.8–5.2)
SODIUM SERPL-SCNC: 135 MMOL/L (ref 133–144)
SPECIMEN SOURCE: ABNORMAL
WBC # BLD AUTO: 7.4 10E9/L (ref 4–11)

## 2018-04-04 PROCEDURE — 99238 HOSP IP/OBS DSCHRG MGMT 30/<: CPT | Performed by: INTERNAL MEDICINE

## 2018-04-04 PROCEDURE — A9270 NON-COVERED ITEM OR SERVICE: HCPCS | Mod: GY | Performed by: NURSE PRACTITIONER

## 2018-04-04 PROCEDURE — 25000128 H RX IP 250 OP 636: Performed by: PHYSICIAN ASSISTANT

## 2018-04-04 PROCEDURE — 25000132 ZZH RX MED GY IP 250 OP 250 PS 637: Mod: GY | Performed by: INTERNAL MEDICINE

## 2018-04-04 PROCEDURE — 85025 COMPLETE CBC W/AUTO DIFF WBC: CPT | Performed by: NURSE PRACTITIONER

## 2018-04-04 PROCEDURE — 80053 COMPREHEN METABOLIC PANEL: CPT | Performed by: NURSE PRACTITIONER

## 2018-04-04 PROCEDURE — 25000132 ZZH RX MED GY IP 250 OP 250 PS 637: Mod: GY | Performed by: NURSE PRACTITIONER

## 2018-04-04 PROCEDURE — 83735 ASSAY OF MAGNESIUM: CPT | Performed by: NURSE PRACTITIONER

## 2018-04-04 PROCEDURE — 36592 COLLECT BLOOD FROM PICC: CPT | Performed by: NURSE PRACTITIONER

## 2018-04-04 PROCEDURE — 85610 PROTHROMBIN TIME: CPT | Performed by: NURSE PRACTITIONER

## 2018-04-04 PROCEDURE — 25000128 H RX IP 250 OP 636: Performed by: NURSE PRACTITIONER

## 2018-04-04 PROCEDURE — 25000132 ZZH RX MED GY IP 250 OP 250 PS 637: Mod: GY | Performed by: PHYSICIAN ASSISTANT

## 2018-04-04 PROCEDURE — 25000125 ZZHC RX 250: Performed by: PHYSICIAN ASSISTANT

## 2018-04-04 PROCEDURE — A9270 NON-COVERED ITEM OR SERVICE: HCPCS | Mod: GY | Performed by: PHYSICIAN ASSISTANT

## 2018-04-04 PROCEDURE — 84100 ASSAY OF PHOSPHORUS: CPT | Performed by: NURSE PRACTITIONER

## 2018-04-04 RX ORDER — METHOCARBAMOL 500 MG/1
500 TABLET, FILM COATED ORAL 4 TIMES DAILY PRN
Qty: 120 TABLET | DISCHARGE
Start: 2018-04-04 | End: 2018-06-22

## 2018-04-04 RX ORDER — TRAMADOL HYDROCHLORIDE 50 MG/1
50 TABLET ORAL EVERY 6 HOURS
Status: DISCONTINUED | OUTPATIENT
Start: 2018-04-04 | End: 2018-04-04 | Stop reason: HOSPADM

## 2018-04-04 RX ORDER — ACETAMINOPHEN 325 MG/1
650 TABLET ORAL EVERY 4 HOURS PRN
Status: ON HOLD | DISCHARGE
Start: 2018-04-04 | End: 2018-07-29

## 2018-04-04 RX ORDER — DIPHENOXYLATE HCL/ATROPINE 2.5-.025MG
1 TABLET ORAL 4 TIMES DAILY PRN
Status: DISCONTINUED | OUTPATIENT
Start: 2018-04-04 | End: 2018-04-04 | Stop reason: HOSPADM

## 2018-04-04 RX ORDER — SIMETHICONE 40MG/0.6ML
40 SUSPENSION, DROPS(FINAL DOSAGE FORM)(ML) ORAL EVERY 6 HOURS PRN
DISCHARGE
Start: 2018-04-04 | End: 2019-02-07

## 2018-04-04 RX ORDER — LOPERAMIDE HYDROCHLORIDE 2 MG/1
2 TABLET ORAL 4 TIMES DAILY PRN
Qty: 60 TABLET | Refills: 3 | Status: ON HOLD | DISCHARGE
Start: 2018-04-04 | End: 2018-07-29

## 2018-04-04 RX ORDER — TRAMADOL HYDROCHLORIDE 50 MG/1
50 TABLET ORAL EVERY 6 HOURS
Qty: 20 TABLET | Refills: 0 | Status: ON HOLD | OUTPATIENT
Start: 2018-04-04 | End: 2018-07-29

## 2018-04-04 RX ORDER — ONDANSETRON 8 MG/1
8 TABLET, ORALLY DISINTEGRATING ORAL EVERY 8 HOURS PRN
Qty: 60 TABLET | Status: ON HOLD | DISCHARGE
Start: 2018-04-04 | End: 2018-07-29

## 2018-04-04 RX ORDER — DIPHENOXYLATE HCL/ATROPINE 2.5-.025MG
1 TABLET ORAL 4 TIMES DAILY PRN
Qty: 40 TABLET | Refills: 0 | Status: ON HOLD | OUTPATIENT
Start: 2018-04-04 | End: 2018-07-29

## 2018-04-04 RX ORDER — MULTIVITAMIN,THERAPEUTIC
1 TABLET ORAL DAILY
Refills: 0 | DISCHARGE
Start: 2018-04-04 | End: 2018-07-25

## 2018-04-04 RX ORDER — FLUORIDE TOOTHPASTE
5-10 TOOTHPASTE DENTAL 4 TIMES DAILY PRN
DISCHARGE
Start: 2018-04-04 | End: 2018-10-22

## 2018-04-04 RX ORDER — LORAZEPAM 0.5 MG/1
0.5 TABLET ORAL EVERY 4 HOURS PRN
Qty: 30 TABLET | Refills: 0 | Status: SHIPPED | OUTPATIENT
Start: 2018-04-04 | End: 2018-05-25

## 2018-04-04 RX ADMIN — DIPHENHYDRAMINE HYDROCHLORIDE AND LIDOCAINE HYDROCHLORIDE AND ALUMINUM HYDROXIDE AND MAGNESIUM HYDRO 10 ML: KIT at 07:59

## 2018-04-04 RX ADMIN — TRAMADOL HYDROCHLORIDE 50 MG: 50 TABLET, COATED ORAL at 14:31

## 2018-04-04 RX ADMIN — SODIUM CHLORIDE, PRESERVATIVE FREE 5 ML: 5 INJECTION INTRAVENOUS at 14:32

## 2018-04-04 RX ADMIN — POTASSIUM PHOSPHATE, MONOBASIC AND POTASSIUM PHOSPHATE, DIBASIC 10 MMOL: 224; 236 INJECTION, SOLUTION INTRAVENOUS at 07:56

## 2018-04-04 RX ADMIN — DIPHENHYDRAMINE HYDROCHLORIDE AND LIDOCAINE HYDROCHLORIDE AND ALUMINUM HYDROXIDE AND MAGNESIUM HYDRO 10 ML: KIT at 04:45

## 2018-04-04 RX ADMIN — TRAMADOL HYDROCHLORIDE 50 MG: 50 TABLET, COATED ORAL at 08:04

## 2018-04-04 RX ADMIN — HYDROCORTISONE: 25 CREAM TOPICAL at 08:11

## 2018-04-04 ASSESSMENT — PAIN DESCRIPTION - DESCRIPTORS: DESCRIPTORS: DISCOMFORT

## 2018-04-04 NOTE — PLAN OF CARE
Problem: Patient Care Overview  Goal: Plan of Care/Patient Progress Review  Outcome: Improving  AVSS. Afebrile. Denies SOB, nausea. Radiation burn pain when turning; Tramadol given x1 with comfortable relief. Tolerating clears with good appetite. Joiner with good output. Aquaphor applied to radiation burns. Anticipating discharge tomorrow, pending placement to [facility]. Continue monitoring and with POC.

## 2018-04-04 NOTE — DISCHARGE SUMMARY
[facility], [place]    Discharge Summary  Hematology / Oncology    Date of Admission:  3/19/2018  Date of Discharge:  4/4/2018  Discharging Provider: Brenda Gurrola  Date of Service (when I saw the patient): 04/04/18    Discharge Diagnoses   Malignant neoplasm of anal canal   Dehydration  Diarrhea, unspecified type  Flatulence, eructation and gas pain  Mouth dryness  Age-related osteoporosis with current pathological fracture, initial encounter  Fall, initial encounter  Closed fracture of three ribs of right side, initial encounter    History of Present Illness   Elizabeth Taylor is a 77-year-old female with 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     Please see H&P for further detail of history.      Hospital Course   Elizabeth Taylor was admitted on 3/19/2018.  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 Hospital course was complicated by a SBO for which pt was started on TPN, severe perineal wound excoriation from radiation requiring assistance by wound care and catheter associated UTI. She developed deconditioning and was ultimately discharged to TCU.    Detailed hospitalization below:      #Stage IIIA (T2 N1) SCC of anus  Followed by Dr. Ray. S/p 1 cycle 5-FU + mitomycin (D1 2/12) followed by 1 cycle single-agent 5-FU (d/t toxicity and age, D29 3/11) with concurrent radiation (completed 3/23).   - Intermittently expressing feelings of hopelessness and feeling at  peace with dying, not wanting to continue with treatment. Consulted palliative care SW for assistance with illness coping and decision making, appreciate their assistance. Feeling more hopeful now that she has completed XRT. She has f/u appt with Dr. Ray on 4/6/18.      Malignancy/Treatment related (chemotherapy/XRT) complications:    #Intractable diarrhea  #Nausea  #Dehydration  #Electrolyte derangements  Suspect nausea, diarrhea secondary to mucositis from recent chemotherapy (5-FU) and XRT. C diff & enteric panel were negative. Likely her anal sphincter tone is compromised, and incontinence will continue to be an issue. Course has been complicated by SBO (see below).   - Continue Lomotil 1 tab QID prn and Imodium QID 1 tab prn. HOLD antidiarrheals if pt is having less than two BM/day.     #Small bowel obstruction, resolved  AXR 3/27 for NG tube confirmation noted concern for possible SBO. CT CAP 3/28 shows SBO. TPN started 3/29. No evidence of nausea, but does have worsening abdominal distention. Repeat KUB 4/2 showing no change in small bowel distension from prior CT. Clinically, patient is improving with active bowel sounds, continues bowel movements and improvement in distension.   - Diet advanced to FLD + continued TPN.      #Perianal wound, secondary to radiation  Pain controlled with Tylenol and Tramadol. Appreciate [facility] assistance in management of this wound.   - Care to b/l buttocks, perineum, groin, and perianal area wound BID and PRN: Cleanse with CHAU cleanser very VERY gently and apply generous amount of Proshield skin protectant on affected areas. Ensure pt has Geomatt cushion in the chair while sitting up in the chair.     #Hypoxemia  #Hydropneumothorax, right side, likely secondary to fractured ribs  #Hydrothorax, left side  Stable respiratory status on ~1L NC likely 2/2 to b/l Pleural effusions. CXR on 3/29 with trace right apical pneumothorax. Moderate layering bilateral pleural effusions with  bilateral lower lobes and left retrocardiac opacities, atelectasis vs consolidation. Pneumothorax likely from fall resulting in rib fractures (see below). Repeat CXR 4/2 showing increased b/l pleural effusions and no change in atelectasis/consolidation. Suspect no benefit in a thoracentesis as with low albumin, fluid will continue to accumulate. Also stable on 1L and afebrile.  - Supplemental oxygenation to keep O2 >90%    #Pancytopenia  Secondary to underlying disease, recent chemo/XRT. No transfusion requirements during hospital stay.       #Weakness  #Fall at home  #Fractured R 8th, 9th and 10th rib  Suspect weakness, falls secondary to above issues.   - Pain control well managed with Tylenol 650 mg q6hrs prn and scheduled Tramadol 50 mg QID. Will continue this regimen.   - PT/OT recommending TCU.       #Malnutrition, in context of acute on chronic illness  % Intake: </= 50% for >/= 5 days (severe); Subcutaneous Fat Loss: Facial region: Moderate, Upper arm and Thoracic/intercostal: Severe   - Continue TPN (3/29-present). Orders faxed to TCU. Will continue to address outpatient pending PO intake advancement.       #Coagulopathy   R/t severe malnutrition & infection (UTI). S/p PO vitamin K 5 mg (3/19, 3/20, 3/21, 2x on 3/22, 3/23 and 3/27 (for INR of 1.87). Now improved.       #Catheter associated UTI (Klebsiella pneumoniae & proteus mirabilis)  Pt has Joiner catheter. Completed 10 day course of Cipro 500mg BID (x3/21-3/30). Joiner was changed 4/2 with outpt appearing cloudy/dark. UA dirty. UC with >100K colonies yeast. Suspect this is a contaminant. Will hold off on antifungal as pt not symptomatic or febrile.       #Altered mental status   Noted to be disoriented. 3/24 AM. Was getting concurrent oxycodone/opium tincture, which has held. Mental status has now improved, so likely 2/2 medications.      #Discharge Pain Plan:   - During her hospitalization, Elizabeth experienced pain due to rib fractures and perianal  excoriation. The pain plan for discharge was discussed with Elizabeth and the plan was created in a collaborative fashion.    - Opioids prescribed on discharge: Tramadol  - Duration of opioids after discharge: Pending healing process of rib fractures, TCU to continue to evaluate  - Bowel regimen: currently experiencing diarrhea from chemotherapy, so will defer.      Dispo: Discharged to TCU ([facility]) on 4/4/18.     Follow-up: Has appt with Dr. Ray on 4/6 at 10 am. TCU aware.      Above plan discussed with staff physician, Dr. Patricia Gurrola PA-C  Hematology/Oncology  Pager: [phone]    Significant Results and Procedures   Results for orders placed or performed during the hospital encounter of 03/19/18   XR Chest 2 Views    Narrative    Study: XR CHEST 2 VW 3/19/2018 2:06 PM    Comparison: Radiograph from 3/17/2018, CT images from 3/7/2018.    History: fall with right post rib and chest injury    Findings:   PA and lateral radiographs of the chest are obtained and reviewed.  Right IJ Port-A-Cath with tip in the high right atrium. Right apical  pleural thickening. Bilateral small pleural effusions, right greater  than left. Cardiac and mediastinal silhouettes are within normal  limits. Upper abdomen is unremarkable. Bibasilar hazy opacities.  Pulmonary vasculature is mildly indistinct. Age indeterminate  compression deformities of the thoracic spine. Chronic appearing  fracture deformity of left proximal humerus. Fracture deformity of the  eighth right rib. Curvature of the spine to the right. Upper abdomen  is unremarkable.      Impression    Impression:   1. Fracture deformity of the eighth right rib, new since 3/17/2018.  2. Bilateral pleural effusions, right greater than left, and right  basilar opacities, may represent consolidation, effusion, atelectasis.  2. Age-indeterminate compression deformities of the thoracic spine.  3. Mild pulmonary vasculature congestion.    I have  personally reviewed the examination and initial interpretation  and I agree with the findings.    BOSTON ASHFORD MD   XR Chest Port 1 View    Narrative    Exam:  XR CHEST PORT 1 VW, 3/24/2018 3:12 PM    History: New oxygen requirement;     Comparison:  3/19/2018    Findings:  AP views of the chest. Right chest wall Port-A-Cath tip  projects over the right atrium. The cardiomediastinal silhouette is  stable. Trachea is midline. Small bilateral pleural effusions.  Opacification of the left costophrenic angle. Bibasilar opacities.  Curvature of the spine to the right. Fracture deformity of the right  eighth rib. Degenerative changes of the shoulders. Upper abdomen is  unremarkable.      Impression    Impression:    Small bilateral layering pleural effusions with overlying  atelectasis/consolidation.    I have personally reviewed the examination and initial interpretation  and I agree with the findings.    SHARON PAREKH MD   XR Abdomen Port 1 View    Narrative    XR ABDOMEN PORT 1 VW  3/27/2018 2:07 PM      HISTORY: confirm NG tube placement. thanks.;     COMPARISON: Abdominal CT 3/7/2018    FINDINGS:   Portable supine view of the chest. Gastric tube tip and sidehole  projecting above the level of the diaphragm. There is abnormal small  bowel distention throughout the abdomen, bowel loops measuring up to  5.5 cm in diameter. There are opacity silhouetting the left  hemidiaphragm, unchanged from the comparison examination. There is a  partially visualized right pleural effusion. Lucency at the right lung  base/right hemidiaphragm is unusual. Bone findings are unchanged.      Impression    IMPRESSION:   1. Gastric tube tip and sidehole above the diaphragm.  2. Abnormal small bowel dilation concerning for obstruction.  3. Partially visualized right pleural effusion and retrocardiac area  of consolidation. Atypical lucency along the right hemidiaphragm may  be related to patient rotation, however recommend follow-up  abdominal  radiograph to exclude free air.    JOSE REILLY MD   XR Abdomen Port 1 View     Value    Radiologist flags Question pneumoperitoneum (Urgent)    Narrative    EXAM: XR ABDOMEN PORT 1 VW  3/27/2018 4:48 PM      HISTORY: re-eval NG tube after advancing;     COMPARISON: 3/27/2018 13:50 hours    FINDINGS: Semiupright AP image of the abdomen. Gastric tube tip has  been slightly advanced and now projects below the gastroesophageal  junction, however the sidehole remains in the esophagus. Unchanged  right chest wall Port-A-Cath device, with tip at the cavoatrial  junction. Unchanged gas dilated loops of bowel. Questionable lucency  under the right hemidiaphragm, although may represent lung fissure  intra-abdominal free air cannot be excluded. No portal venous gas or  pneumobilia. Small bilateral pleural effusions.       Impression    IMPRESSION:   1. Gastric tube tip has been slightly advanced and now projects below  the gastroesophageal junction, however the sidehole remains in the  esophagus. Recommend advancing an additional 8 cm.  2. Unchanged gaseous distention of the small bowel.  3. Lucency along the right hemidiaphragm, suspicious for  pneumoperitoneum. Recommend follow-up lateral decubitus view.  4. Unchanged small bilateral pleural effusions.    [Urgent Result: Question pneumoperitoneum]    Finding was identified on 3/27/2018 5:00 PM.     [facility] was contacted by Dr. Camara  at 3/27/2018 5:15 PM and  verbalized understanding of the urgent finding.     I have personally reviewed the examination and initial interpretation  and I agree with the findings.    BABITA DOE MD   X-ray Abdomen flat port    Narrative    Single view of the abdomen    Comparisons: Same date at 1646    HISTORY: Checking for NG tube placement    FINDINGS: Nasogastric tube has been advanced with the tip and sidehole  in the stomach. Right-sided Port-A-Cath remains in place. Unchanged  blunting of both costophrenic angles  with associated bibasilar  opacities. Dilated loops of small bowel are again noted. Acute  fractures of the right eighth, ninth and 10th ribs.      Impression    IMPRESSION:  1. Nasogastric tube in good position. Unchanged dilated small bowel.  2. Small bilateral pleural effusions with associated bibasilar  atelectasis.  3. Acute appearing fractures of the right eighth, ninth and 10th ribs.    SHARON PAREKH MD   XR Abdomen Port 1 View    Narrative    2 decubitus views of the abdomen    Comparisons: Abdominal plain films same date at 1646 and 1720    HISTORY: Follow-up suspected free intraperitoneal gas.    FINDINGS: There is no free intraperitoneal gas. Multiple dilated loops  of small bowel with air-fluid levels. The nasogastric tube with tip  and sidehole in the stomach.    Possible air-fluid levels in the right hemithorax. The air-fluid level  appears to parallel the air-fluid levels in the small bowel, raising a  suspicion for hydropneumothorax. Acute appearing fractures of the  eighth, ninth and 10th right ribs.      Impression    IMPRESSION:  1. No free intraperitoneal gas.  2. Air-fluid level in the right hemithorax, suspicious for  hydropneumothorax in the setting of acute right rib fractures.  3. Dilated loops of small bowel in the abdomen concerning for small  bowel obstruction.    SHARON PAREKH MD   CT Chest/Abdomen/Pelvis w Contrast    Narrative    EXAMINATION: CT CHEST/ABDOMEN/PELVIS W CONTRAST, 3/28/2018 1:26 PM    TECHNIQUE:  Helical CT images from the thoracic inlet through the  symphysis pubis were obtained  with contrast.    CONTRAST: 85 mL Isovue 370    DOSE (DLP): 599 mGycm    COMPARISON: 3/7/2018    HISTORY: hx scc of anus, assess for poss SBO possibly seen on AXR.  Assess R hydropneumothorax s/p rib frx.;     FINDINGS:    Chest:  Bilateral moderate pleural effusions with concomitant basilar  atelectasis. Right small pneumothorax. No suspicious lymphadenopathy.  Right chest port  with distal tip at the cavoatrial junction. Patent  proximal tracheobronchial tree. No proximal pulmonary embolism. No  suspicious hilar lymph nodes.     Abdomen and pelvis: Scattered hypodense non suspicious lesions  throughout the liver, unchanged. Gallbladder contains intermediate  hyperdense material, not present on previous examinations. Spleen,  adrenal glands and pancreas are within normal limits. Bilateral kidney  cysts. Diffuse ascites. Bladder is decompressed contains Joiner  catheter. Uterus is retroflexed within normal limits. Significant  distention of the small bowel without evidence of clear transition  point. Difficult to visualize anal mass on current exam. Thickening  and mucosal hyperenhancement of the sigmoid colon and rectum. Patent  main abdominal vessels with portal vein prominence measuring up to 17  mm. Diffuse ascites. No significant lymphadenopathy.    Bones and soft tissues: Convex left lumbar scoliosis centered at L3-4.  Significant facet arthropathy on the right at L4-5 and bilaterally at  L5-S1. Endplate sclerosis and arthropathy at the spinous processes of  L3 and L4. No aggressive-appearing osteolytic or osteoblastic.       Impression    IMPRESSION:   1. Right hydropneumothorax and left hydrothorax  2. Diffuse abdominal ascites   3. Small bowel obstruction without evidence of transition point  4. Rectosigmoiditis  5. Hyperdense material in the gallbladder    I have personally reviewed the examination and initial interpretation  and I agree with the findings.    TAMIKO WHALEN MD   XR Chest Port 1 View    Narrative    Exam: Chest x-ray, 1 view, 3/29/2018.    COMPARISON: CT exam 3/28/2018 and chest x-ray 3/24/2018.    FINDINGS: AP portable view of the chest was obtained. Trace right  apical pneumothorax. Moderate layering bilateral pleural effusions.  Bilateral lower lobes and left retrocardiac opacities.  Cardiomediastinal silhouette is not enlarged. Tortuous thoracic aorta  with  calcifications. Right IJ Port-A-Cath with its tip projecting over  the high right atrium. Degenerative changes of the spine and  shoulders.      Impression    IMPRESSION:  1. Trace right apical pneumothorax.  2. Moderate layering bilateral pleural effusions with bilateral lower  lobes and left retrocardiac opacities, atelectasis versus  consolidation.    EDWARD PRIETO MD   XR Abdomen Port 2 Views    Narrative    Exam: XR ABDOMEN PORT 2 VW, 4/2/2018 2:17 PM    Indication: Increasing abdominal distension with previous SBO. Eval  for status of SBO.;     Comparison: CT of the chest abdomen and pelvis dated 3/29/2018,  abdominal radiographs dated 3/27/2018.    Findings:   Supine AP views of the abdomen. Multiple gas distended loops of  predominantly small bowel. No significant change compared to CT dated  3/28/2018. No pneumatosis or portal venous gas. Bilateral pleural  effusions and pulmonary opacities are better seen on same-day chest  radiograph. Degenerative changes of the lumbar spine.       Impression    Impression:   1. No significant change in small bowel distention compared to CT  dated 3/28/2018.  2. Bilateral pleural effusions and bibasilar opacities, which are  better seen on same-day chest radiograph.    I have personally reviewed the examination and initial interpretation  and I agree with the findings.    EMILY RUVALCABA MD   XR Chest Port 1 View    Narrative    Exam:  XR CHEST PORT 1 VW, 4/2/2018 4:07 PM    History: Diminished lung sounds. Eval for fluid vs atelectasis vs  infection.;     Comparison:  Chest radiograph 3/29/2018.    Findings:  Supine view of the chest. Right chest wall portacatheter  tip projects over the mid right atrium. Cardiac silhouette is  obscured. Increased bilateral layering pleural effusions. No  significant change in bilateral lower lobe opacities. No pneumothorax.  Biapical scarring. Visualized upper abdomen is unremarkable. Severe  degenerative change of both glenohumeral  joints. Old left humeral neck  fracture.      Impression    Impression:    Increased bilateral layering pleural effusions without significant  change in bibasilar atelectasis and/or consolidation    I have personally reviewed the examination and initial interpretation  and I agree with the findings.    BOSTON ASHFORD MD     Pending Results   These results will be followed up in clinic  Unresulted Labs Ordered in the Past 30 Days of this Admission     Date and Time Order Name Status Description    4/2/2018 1200 Urine Culture Aerobic Bacterial Preliminary           Code Status   Full Code    Primary Care Physician   Felisha Davis    Physical Exam   Temp: 99.5  F (37.5  C) Temp src: Oral BP: 123/75 Pulse: 99 Heart Rate: 84 Resp: 18 SpO2: 92 % O2 Device: None (Room air)    Vitals:    03/29/18 0900 03/31/18 1156 04/03/18 1500   Weight: 58.4 kg (128 lb 12.8 oz) 60.7 kg (133 lb 13.1 oz) 59.6 kg (131 lb 8 oz)     Vital Signs with Ranges  Temp:  [97.1  F (36.2  C)-100  F (37.8  C)] 99.5  F (37.5  C)  Pulse:  [99] 99  Heart Rate:  [] 84  Resp:  [18] 18  BP: (110-124)/(64-75) 123/75  SpO2:  [90 %-93 %] 92 %  I/O last 3 completed shifts:  In: 1468.8 [P.O.:120]  Out: 2050 [Urine:2050]    Time Spent on this Encounter   I, Brenda Gurrola, personally saw the patient today and spent greater than 30 minutes discharging this patient.    Discharge Disposition   Discharged to rehabilitation facility  Condition at discharge: Stable    Consultations This Hospital Stay   PHYSICAL THERAPY ADULT IP CONSULT  OCCUPATIONAL THERAPY ADULT IP CONSULT  MEDICATION HISTORY IP PHARMACY CONSULT  WOUND OSTOMY CONTINENCE NURSE  IP CONSULT  PALLIATIVE CARE ADULT IP CONSULT  LYMPHEDEMA THERAPY IP CONSULT  NUTRITION SERVICES ADULT IP CONSULT  PHARMACY IP CONSULT  PHARMACY/NUTRITION TO START AND MANAGE TPN  PHARMACY IP CONSULT  VASCULAR ACCESS CARE ADULT IP CONSULT  PHYSICAL THERAPY ADULT IP CONSULT  OCCUPATIONAL THERAPY ADULT IP  CONSULT    Discharge Orders     AntiEmbolism Stockings   Bilateral below knee length.On in the morning, off at night     CBC with platelets differential   Last Lab Result: Hemoglobin (g/dL)      Date                     Value                04/04/2018               9.7 (L)          ----------     **Comprehensive metabolic panel FUTURE anytime     General info for SNF   Length of Stay Estimate: Short Term Care: Estimated # of Days <30  Condition at Discharge: Stable  Level of care:skilled   Rehabilitation Potential: Good  Admission H&P remains valid and up-to-date: Yes  Recent Chemotherapy: Date: 3/23/18 (now complete)                     Use Nursing Home Standing Orders: Yes     Mantoux instructions   Give two-step Mantoux (PPD) Per Facility Policy Yes     Reason for your hospital stay   You were admitted for complications from chemotherapy, including diarrhea/fatigue/weight loss. You are improving from these complications, but still require assistance at rehab.     Glucose monitor nursing POCT   Before meals and at bedtime     Intake and output   Every shift     Daily weights   Call Provider for weight gain of more than 2 pounds per day or 5 pounds per week.     Joiner catheter   To straight gravity drainage. Change catheter every 2 weeks and PRN for leaking or decreased uring output with signs of bladder distention. DO NOT change catheter without a specific MD order IF diagnosis of benign prostatic hypertrophy (BPH), neurogenic bladder, or other urological conditions     Activity - Up with nursing assistance     Wound care (specify)   Site:   Bilateral buttocks, perineum, groin, and perianal area wound BID and PRN  Instructions: Cleanse with CHAU cleanser very VERY gently and apply generous amount of Proshield skin protectant on affected areas.  Ensure pt has Geomatt cushion in the chair while sitting up in the chair.     Follow Up and recommended labs and tests   You have a follow-up appointment with your primary  oncologist:    [facility]  [address]   [place] [address]  Dr. Ray at 10:00 am     Physical Therapy Adult Consult   Evaluate and treat as clinically indicated.    Reason:  Deconditioning     Occupational Therapy Adult Consult   Evaluate and treat as clinically indicated.    Reason:  Deconditioning     Oxygen - Nasal cannula   1-2 Lpm by nasal cannula to keep O2 sats 92% or greater.     Fall precautions     Advance Diet as Tolerated   Follow this diet upon discharge: Full liquid diet and advance diet as tolerated. Also has TPN (components and rate were faxed).       Discharge Medications   Current Discharge Medication List      START taking these medications    Details   traMADol (ULTRAM) 50 MG tablet Take 1 tablet (50 mg) by mouth every 6 hours  Qty: 20 tablet, Refills: 0    Associated Diagnoses: Closed fracture of one rib of right side, initial encounter      diphenoxylate-atropine (LOMOTIL) 2.5-0.025 MG per tablet Take 1 tablet by mouth 4 times daily as needed for diarrhea . If having less than two bowel movements daily, DO NOT GIVE  Qty: 40 tablet, Refills: 0    Associated Diagnoses: Diarrhea, unspecified type; Malignant neoplasm of anal canal (H)      ondansetron (ZOFRAN-ODT) 8 MG ODT tab Take 1 tablet (8 mg) by mouth every 8 hours as needed For nausea  Qty: 60 tablet    Associated Diagnoses: Malignant neoplasm of anal canal (H)      simethicone (MYLICON) 40 MG/0.6ML suspension Take 0.6 mLs (40 mg) by mouth every 6 hours as needed for cramping    Associated Diagnoses: Malignant neoplasm of anal canal (H); Flatulence, eructation and gas pain      artificial saliva (BIOTENE DRY MOUTHWASH) LIQD liquid Swish and spit 5-10 mLs in mouth 4 times daily as needed for dry mouth    Associated Diagnoses: Mouth dryness      multivitamin, therapeutic (THERA-VIT) TABS tablet Take 1 tablet by mouth daily  Refills: 0    Associated Diagnoses: Age-related osteoporosis with current  pathological fracture, initial encounter      methocarbamol (ROBAXIN) 500 MG tablet Take 1 tablet (500 mg) by mouth 4 times daily as needed for muscle spasms  Qty: 120 tablet    Associated Diagnoses: Closed fracture of one rib of right side, initial encounter         CONTINUE these medications which have CHANGED    Details   acetaminophen (TYLENOL) 325 MG tablet Take 2 tablets (650 mg) by mouth every 4 hours as needed for pain    Associated Diagnoses: Closed fracture of one rib of right side, initial encounter      LORazepam (ATIVAN) 0.5 MG tablet Take 1 tablet (0.5 mg) by mouth every 4 hours as needed (Anxiety, Nausea/Vomiting or Sleep)  Qty: 30 tablet, Refills: 0    Associated Diagnoses: Malignant neoplasm of anal canal (H)      loperamide (LOPERAMIDE A-D) 2 MG tablet Take 1 tablet (2 mg) by mouth 4 times daily as needed for diarrhea . If having less than two bowel movements daily, DO NOT GIVE  Qty: 60 tablet, Refills: 3    Associated Diagnoses: Malignant neoplasm of anal canal (H)      calcium-vitamin D (CALTRATE) 600-400 MG-UNIT per tablet Take 1 tablet by mouth daily  Qty: 60 tablet    Associated Diagnoses: Age-related osteoporosis with current pathological fracture, initial encounter         CONTINUE these medications which have NOT CHANGED    Details   magic mouthwash (ENTER INGREDIENTS IN COMMENTS) suspension Swish and spit 5-10 mL four times daily as needed  Qty: 500 mL, Refills: 1    Comments: lidocaine visc 2% 2.5mL/5mL & maalox/mylanta w/ simeth 2.5mL/5mL & diphenhydramine 5mg/5mL    Called to [facility]  Associated Diagnoses: Malignant neoplasm of anal canal (H)      vitamin B complex with vitamin C (VITAMIN  B COMPLEX) TABS tablet Take 1 tablet by mouth daily      hydrocortisone (ANUSOL-HC) 2.5 % cream Place rectally 2 times daily  Qty: 30 g, Refills: 1    Associated Diagnoses: External hemorrhoids         STOP taking these medications       opium tincture dilute, 1 mg/mL morphine equivalent,  Comments:   Reason for Stopping:         lidocaine-prilocaine (EMLA) cream Comments:   Reason for Stopping:         prochlorperazine (COMPAZINE) 10 MG tablet Comments:   Reason for Stopping:         Calcium Carbonate-Vitamin D (CALCIUM 500/VITAMIN D PO) Comments:   Reason for Stopping:         pramipexole (MIRAPEX) 0.125 MG tablet Comments:   Reason for Stopping:             Allergies   Allergies   Allergen Reactions     Darvon [Propoxyphene]      Had reaction in teen years     Penicillins      As a child     Ketoconazole Rash     Data   ROUTINE IP LABS (Last four results)  BMP  Recent Labs  Lab 04/04/18  0648 04/03/18  0723 04/02/18  0633 04/01/18  0554    138 137 136   POTASSIUM 4.0 4.2 4.4 4.3   CHLORIDE 100 104 102 102   ELISE 7.7* 7.4* 7.4* 7.2*   CO2 30 29 31 32   BUN 13 13 15 12   CR 0.45* 0.47* 0.45* 0.40*   GLC 98 107* 94 99     CBC  Recent Labs  Lab 04/04/18 0648 04/03/18  0723 04/02/18  0633 04/01/18  0554   WBC 7.4 6.7 6.3 5.4   RBC 3.24* 3.19* 3.44* 3.39*   HGB 9.7* 9.6* 10.3* 10.2*   HCT 30.2* 30.1* 32.3* 31.4*   MCV 93 94 94 93   MCH 29.9 30.1 29.9 30.1   MCHC 32.1 31.9 31.9 32.5   RDW 15.1* 15.2* 15.1* 15.1*    265 274 241     INR  Recent Labs  Lab 04/04/18  0648 04/03/18  0723 04/02/18  0633 04/01/18  0554   INR 1.13 1.18* 1.11 1.11

## 2018-04-04 NOTE — PLAN OF CARE
Problem: Patient Care Overview  Goal: Plan of Care/Patient Progress Review  OT 7D: Attempted earlier this date but patient on the phone with a friend. Pt with plans to d/c to TCU with transportation setup at 4pm. Will cancel OT for this date.         Occupational Therapy Discharge Summary    Reason for therapy discharge:    Discharged to transitional care facility.    Progress towards therapy goal(s). See goals on Care Plan in Psychiatric electronic health record for goal details.  Goals not met.  Barriers to achieving goals:   limited tolerance for therapy and discharge from facility.    Therapy recommendation(s):    Continued therapy is recommended.  Rationale/Recommendations:  Pt below baseline in functional mobility and ADLs and would benefit from skilled therapy to promote IND. .

## 2018-04-04 NOTE — PLAN OF CARE
Problem: Patient Care Overview  Goal: Plan of Care/Patient Progress Review  Outcome: No Change  Afebrile, all other vital signs stable thus far this shift. No complaints of dizziness, SOB, or N/V. Some discomfort to bottom/perianal area- declining pain meds at this time. Some mouth pain noted as well- PRN magic mouth wash given. Significant excoriation/redness/radiation burns continue to be noted to perianal area. Joiner in place- patent, adequate urine output noted. TPN and lipids infusing, see MAR. +3 edema noted to BLE. Snacking on chicken broth and popsicles throughout the night. No further complaints. Possible D/C today? Will continue to monitor.     Problem: Diarrhea (Adult)  Goal: Identify Related Risk Factors and Signs and Symptoms  Related risk factors and signs and symptoms are identified upon initiation of [employer] Clinical Practice Guideline (CPG).   Outcome: No Change   04/04/18 0519   Diarrhea   Related Risk Factors (Diarrhea) radiation treatment   Signs and Symptoms (Diarrhea) abdominal pain/cramps;fecal incontinence

## 2018-04-04 NOTE — PLAN OF CARE
Problem: Patient Care Overview  Goal: Plan of Care/Patient Progress Review  PT 7D: Cancel, pt preparing for discharge.

## 2018-04-04 NOTE — PLAN OF CARE
Problem: Patient Care Overview  Goal: Plan of Care/Patient Progress Review  Outcome: Improving  Diarrhea stool x1 this shift.feeling much better.perianal area skin is healing.applied elfego cream after incontinent of stool. Joiner patent. Afeb.vss. tpn civi via port a cath.phos replaced as ordered.full liq diet now.

## 2018-04-04 NOTE — PROGRESS NOTES
Social Work Services Discharge Note      Patient Name:  Elizabeth Taylor     Anticipated Discharge Date:  04/04/2018    Discharge Disposition:   TCU:  [facility]    Following MD:  PCP Felisha Davis     Pre-Admission Screening (PAS) online form has been completed.  The Level of Care (LOC) is:  Determined  Confirmation Code is:  [id number]  Patient/caregiver informed of referral to [facility] for Pre-Admission Screening for skilled nursing facility (SNF) placement and to expect a phone call post discharge from SNF.     Additional Services/Equipment Arranged:  Patient requiring stretcher transport for O2 monitoring during transport requiring 1-2L NC and unable to monitor/manage on her own. [facility] stretcher transport scheduled at 4:00PM.     Patient / Family response to discharge plan:  Agreeable     Persons notified of above discharge plan:  Patient, patient's friend Adamaris (p:[phone]), Brenda Gurrola PA-C, bedside RN, [place] admissions liaison Nicol (p: [phone]), receiving facility    Staff Discharge Instructions:  Please fax discharge orders and signed hard scripts for any controlled substances.   SW faxed discharge orders and signed hard scripts to [facility].  Please print a packet and send with patient.     CTS Handoff completed:  YES - Sent to patient's PCP     Medicare Notice of Rights provided to the patient/family:  YES    JEFF Bull, KENYASW  7D Oncology   Phone [phone]  Pager [phone]

## 2018-04-04 NOTE — PLAN OF CARE
Problem: Patient Care Overview  Goal: Plan of Care/Patient Progress Review  Physical Therapy Discharge Summary    Reason for therapy discharge:    Discharged to transitional care facility.    Progress towards therapy goal(s). See goals on Care Plan in [facility] electronic health record for goal details.  Goals not met.  Barriers to achieving goals:   discharge from facility.    Therapy recommendation(s):    Continued therapy is recommended.  Rationale/Recommendations:  TCU to improve functional mobility for safe return to home .

## 2018-04-04 NOTE — PLAN OF CARE
Problem: Patient Care Overview  Goal: Plan of Care/Patient Progress Review  Outcome: Improving  Pt vitals stable and transferred to TCU at 1600 via transport. Port was hep locked by previous RN. Port remained accessed for TPN at TCU facility. Documentation was given to transport for next facility.

## 2018-04-05 ENCOUNTER — PATIENT OUTREACH (OUTPATIENT)
Dept: CARE COORDINATION | Facility: CLINIC | Age: 78
End: 2018-04-05

## 2018-04-05 NOTE — PROGRESS NOTES
Clinic Care Coordination Contact  Care Coordination Transition Communication         Clinical Data: Patient was hospitalized at [facility] from 03/19/18 to 04/04/2018 with diagnosis of malignant neoplasm of anal cancer.     Transition to Facility:              Facility Name: [facility]              Contact name and phone number/fax: TCU THOMAS is Hilary Beatty.   [phone]      Plan: Writer covering for clinic CC RN, Mercedes Duque. Clinic Care Coordinator will await notification from facility staff informing RN/SW Care Coordinator of patient's discharge plans/needs. RN/SW Care Coordinator will review chart and outreach to facility staff every 4 weeks and as needed.     Joycelyn Marr, RN, BSN, PHN  Clinic Care Coordinator  [facility]  [phone]

## 2018-04-06 ENCOUNTER — ONCOLOGY VISIT (OUTPATIENT)
Dept: ONCOLOGY | Facility: CLINIC | Age: 78
End: 2018-04-06
Attending: INTERNAL MEDICINE
Payer: MEDICARE

## 2018-04-06 VITALS — RESPIRATION RATE: 20 BRPM | OXYGEN SATURATION: 92 %

## 2018-04-06 DIAGNOSIS — C21.1 MALIGNANT NEOPLASM OF ANAL CANAL (H): ICD-10-CM

## 2018-04-06 DIAGNOSIS — C21.1 MALIGNANT NEOPLASM OF ANAL CANAL (H): Primary | ICD-10-CM

## 2018-04-06 DIAGNOSIS — M62.81 GENERALIZED MUSCLE WEAKNESS: ICD-10-CM

## 2018-04-06 DIAGNOSIS — R19.7 DIARRHEA, UNSPECIFIED TYPE: ICD-10-CM

## 2018-04-06 LAB
ALBUMIN SERPL-MCNC: 1.3 G/DL (ref 3.4–5)
ALP SERPL-CCNC: 144 U/L (ref 40–150)
ALT SERPL W P-5'-P-CCNC: 47 U/L (ref 0–50)
ANION GAP SERPL CALCULATED.3IONS-SCNC: 6 MMOL/L (ref 3–14)
AST SERPL W P-5'-P-CCNC: 55 U/L (ref 0–45)
BASOPHILS # BLD AUTO: 0.1 10E9/L (ref 0–0.2)
BASOPHILS NFR BLD AUTO: 0.7 %
BILIRUB SERPL-MCNC: 0.4 MG/DL (ref 0.2–1.3)
BUN SERPL-MCNC: 12 MG/DL (ref 7–30)
CALCIUM SERPL-MCNC: 8 MG/DL (ref 8.5–10.1)
CHLORIDE SERPL-SCNC: 100 MMOL/L (ref 94–109)
CO2 SERPL-SCNC: 28 MMOL/L (ref 20–32)
CREAT SERPL-MCNC: 0.46 MG/DL (ref 0.52–1.04)
DIFFERENTIAL METHOD BLD: ABNORMAL
EOSINOPHIL # BLD AUTO: 0.1 10E9/L (ref 0–0.7)
EOSINOPHIL NFR BLD AUTO: 0.7 %
ERYTHROCYTE [DISTWIDTH] IN BLOOD BY AUTOMATED COUNT: 14.6 % (ref 10–15)
GFR SERPL CREATININE-BSD FRML MDRD: >90 ML/MIN/1.7M2
GLUCOSE SERPL-MCNC: 77 MG/DL (ref 70–99)
HCT VFR BLD AUTO: 32.4 % (ref 35–47)
HGB BLD-MCNC: 10.8 G/DL (ref 11.7–15.7)
IMM GRANULOCYTES # BLD: 0.6 10E9/L (ref 0–0.4)
IMM GRANULOCYTES NFR BLD: 4.4 %
LYMPHOCYTES # BLD AUTO: 0.3 10E9/L (ref 0.8–5.3)
LYMPHOCYTES NFR BLD AUTO: 2 %
MCH RBC QN AUTO: 30.4 PG (ref 26.5–33)
MCHC RBC AUTO-ENTMCNC: 33.3 G/DL (ref 31.5–36.5)
MCV RBC AUTO: 91 FL (ref 78–100)
MONOCYTES # BLD AUTO: 0.9 10E9/L (ref 0–1.3)
MONOCYTES NFR BLD AUTO: 6.5 %
NEUTROPHILS # BLD AUTO: 11.7 10E9/L (ref 1.6–8.3)
NEUTROPHILS NFR BLD AUTO: 85.7 %
NRBC # BLD AUTO: 0 10*3/UL
NRBC BLD AUTO-RTO: 0 /100
PLATELET # BLD AUTO: 427 10E9/L (ref 150–450)
PLATELET # BLD EST: ABNORMAL 10*3/UL
POTASSIUM SERPL-SCNC: 3.9 MMOL/L (ref 3.4–5.3)
PROT SERPL-MCNC: 4.9 G/DL (ref 6.8–8.8)
RBC # BLD AUTO: 3.55 10E12/L (ref 3.8–5.2)
SODIUM SERPL-SCNC: 134 MMOL/L (ref 133–144)
WBC # BLD AUTO: 13.7 10E9/L (ref 4–11)

## 2018-04-06 PROCEDURE — 80053 COMPREHEN METABOLIC PANEL: CPT | Performed by: INTERNAL MEDICINE

## 2018-04-06 PROCEDURE — 36591 DRAW BLOOD OFF VENOUS DEVICE: CPT

## 2018-04-06 PROCEDURE — 85025 COMPLETE CBC W/AUTO DIFF WBC: CPT | Performed by: INTERNAL MEDICINE

## 2018-04-06 PROCEDURE — 99215 OFFICE O/P EST HI 40 MIN: CPT | Mod: ZP | Performed by: INTERNAL MEDICINE

## 2018-04-06 NOTE — NURSING NOTE
"Oncology Rooming Note    April 6, 2018 11:12 AM   Elizabeth Taylor is a 77 year old female who presents for:    Chief Complaint   Patient presents with     Oncology Clinic Visit     Return visit related to Anal Cancer     Initial Vitals: There were no vitals taken for this visit. Estimated body mass index is 21.88 kg/(m^2) as calculated from the following:    Height as of 3/19/18: 1.651 m (5' 5\").    Weight as of 4/3/18: 59.6 kg (131 lb 8 oz). There is no height or weight on file to calculate BSA.  Data Unavailable Comment: Data Unavailable   No LMP recorded. Patient is postmenopausal.  Allergies reviewed: No  Medications reviewed: No    Medications: Medication refills not needed today.  Pharmacy name entered into [facility]:    [facility] - [place], [place] - [address]  [facility] [place], [place] - [address]    Clinical concerns: Unable to room patient as she in severe pain and was brought to [address] Dr Ray was notified and is seeing her over there. Provider was notified.    No minutes for nursing intake (face to face time)     Heather Alvarenga LPN            "

## 2018-04-06 NOTE — PROGRESS NOTES
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 She underwent a colonoscopy in April 2017 which noted to have small anal fissure along with internal hemorrhoids. Symptoms continued and  she was referred to colorectal surgery for further evaluation. On exam she was found to have an 1.5 cm anal lesion on the left side along with left groin palpable adenopathy. Biopsy of anal mass came back positive for squamous cell carcinoma. Patient underwent staging workup with MRI pelvis and a PET scan- showed PET avid left anal canal mass along with small left inguinal FDG avid lymph nodes.     02/12/18- Started on concurrent chemoradiation with 5FU/Mitomycin    03/13/18 Skipped  Mitomycin and proceeded with 5FU at reduced dose given neutropenia/old age    03/19/18 -04/04/18 admitted to the [facility] with intractable diarrhea, nausea, generalized weakness and fall at home resulting in multiple right rib fractures. Hospital course was complicated by small bowel obstruction for which patient was started on TPN. Bowel obstruction was managed conservatively eventually improved prior to discharge with advancement of diet to full liquids along with continuing the TPN . She also developed severe perineal  excoriation from radiation requiring extensive wound care. Also developed  catheter associated UTI requiring antibiotic course as well as hydropneumothorax secondary to underlying rib fractures requiring supplemental o2. She was eventually discharged to transitional care unit    SUBJECTIVE     Presents to the clinic today for  hospital discharge FOLLOW UP. She states that she has been doing okay overall since that discharge however during the transport process to the clinic today, complained of worsening rib pain. At the time of my assessment, patient was in bed and appeared much more comfortable. She states that the diarrhea has improved. She has been receiving extensive wound care for perineal area. Complains of generalized fatigue but is working with physical therapy at the nursing facility. Denies fevers chills, cough, dyspnea, sputum production, urinary symptoms or any other complaints.        PAST MEDICAL HISTORY     Past Medical History:   Diagnosis Date     Anal cancer (H)      Endometriosis, site unspecified      Thyroiditis, unspecified     Thryoiditis-resolved         CURRENT OUTPATIENT MEDICATIONS     Current Outpatient Prescriptions   Medication Sig Dispense Refill     traMADol (ULTRAM) 50 MG tablet Take 1 tablet (50 mg) by mouth every 6 hours 20 tablet 0     acetaminophen (TYLENOL) 325 MG tablet Take 2 tablets (650 mg) by mouth every 4 hours as needed for pain       LORazepam (ATIVAN) 0.5 MG tablet Take 1 tablet (0.5 mg) by mouth every 4 hours as needed (Anxiety, Nausea/Vomiting or Sleep) 30 tablet 0     diphenoxylate-atropine (LOMOTIL) 2.5-0.025 MG per tablet Take 1 tablet by mouth 4 times daily as needed for diarrhea . If having less than two bowel movements daily, DO NOT GIVE 40 tablet 0     loperamide (LOPERAMIDE A-D) 2 MG tablet Take 1 tablet (2 mg) by mouth 4 times daily as needed for diarrhea . If having less than two bowel movements daily, DO NOT GIVE 60 tablet 3     ondansetron (ZOFRAN-ODT) 8 MG ODT tab Take 1 tablet (8 mg) by mouth every 8 hours as needed For nausea 60 tablet      calcium-vitamin D (CALTRATE) 600-400 MG-UNIT per tablet Take 1 tablet by mouth daily 60 tablet      simethicone (MYLICON) 40 MG/0.6ML suspension Take 0.6 mLs (40 mg) by mouth every 6 hours as needed for cramping       artificial saliva  (BIOTENE DRY MOUTHWASH) LIQD liquid Swish and spit 5-10 mLs in mouth 4 times daily as needed for dry mouth       multivitamin, therapeutic (THERA-VIT) TABS tablet Take 1 tablet by mouth daily  0     methocarbamol (ROBAXIN) 500 MG tablet Take 1 tablet (500 mg) by mouth 4 times daily as needed for muscle spasms 120 tablet      magic mouthwash (ENTER INGREDIENTS IN COMMENTS) suspension Swish and spit 5-10 mL four times daily as needed 500 mL 1     vitamin B complex with vitamin C (VITAMIN  B COMPLEX) TABS tablet Take 1 tablet by mouth daily       hydrocortisone (ANUSOL-HC) 2.5 % cream Place rectally 2 times daily 30 g 1        ALLERGIES     Allergies   Allergen Reactions     Darvon [Propoxyphene]      Had reaction in teen years     Penicillins      As a child     Ketoconazole Rash        REVIEW OF SYSTEMS   As above in the HPI, o/w complete 12-point ROS was negative.     PHYSICAL EXAM   B/P: 131/82, T: 98.5, P: 78, R: 16  SpO2 Readings from Last 4 Encounters:   04/04/18 92%   03/17/18 100%   03/17/18 94%   03/15/18 98%     Wt Readings from Last 3 Encounters:   04/03/18 59.6 kg (131 lb 8 oz)   03/19/18 59.3 kg (130 lb 12.8 oz)   03/17/18 55.2 kg (121 lb 11.1 oz)     GEN: frail.  EYES:PERRLA  Mouth/ENT: Oropharynx is clear. Mouth sores  NECK: no cervical or supraclavicular lymphadenopathy  LUNGS: diminished breath sounds right lung base  CV: regular  ABDOMEN: soft, non-tender, non-distended, normal bowel sounds  EXT: warm, well perfused, 1+ edema  NEURO: alert  SKIN: .perineal , genital wound excoriation     LABORATORY AND IMAGING STUDIES     Lab Results   Component Value Date    WBC 13.7 04/06/2018     Lab Results   Component Value Date    RBC 3.55 04/06/2018     Lab Results   Component Value Date    HGB 10.8 04/06/2018     Lab Results   Component Value Date    HCT 32.4 04/06/2018     No components found for: MCT  Lab Results   Component Value Date    MCV 91 04/06/2018     Lab Results   Component Value Date    MCH 30.4  04/06/2018     Lab Results   Component Value Date    MCHC 33.3 04/06/2018     Lab Results   Component Value Date    RDW 14.6 04/06/2018     Lab Results   Component Value Date     04/06/2018     Recent Labs   Lab Test  04/06/18   1100  04/04/18   0648   NA  134  135   POTASSIUM  3.9  4.0   CHLORIDE  100  100   CO2  28  30   ANIONGAP  6  5   GLC  77  98   BUN  12  13   CR  0.46*  0.45*   ELISE  8.0*  7.7*          ASSESSMENT AND PLAN     77-year-old female with no significant past medical history who presented with complaints of bright red blood per rectum and was recently found to have an anal mass which on biopsy came back for positive for invasive squamous cell carcinoma. Staging workup showed a PET avid anal mass along with hypermetabolic left inguinal adenopathy.     - Squamous cell carcinoma Anal canal  T2N1- IIIA  Started on definitive chemoradiation with infusional FU 1000 mg/m2 on days 1 to 4 and 29 to 32, plus mitomycin 10 mg/m2 on days 1 and 29 (as tolerated), maximum 20 mg per dose- day 1 on 02/12/18  Day 29 03/13/18 Skipped mitomycin and proceeded with 5FU at reduced dose given neutropenia/old age.  03/19/18 -04/04/18 Developed intractable diarrhea, fatigue and dermatitis in the later half of treatment and had to be admitted.Was eventually discharged and is currently at a rehab facility. Slowly improving  She completed radiation on 03/23/18  Assessment of clinical response would be performed at 8-12 weeks after completion of treatment with clinical exam and BRITTNEY    - Perianal wound secondary to recent radiation  Recommend continuation of care to bilateral buttocks, perineum, and perianal area.    - Diarrhea  Secondary to recent chemoradiation. Improved per patient. Continue with Imodium and Lomotil p.r.n.    - Malnutrition  Continue with TPN nutrition with plans to discontinue once oral intake improves.    - Small bowel obstruction - resolved  - Hydropneumothorax/hypoxemia / fractured ribs   Continue  "supple oxygen to keep \"90%. Pain  well controlled with Tylenol prn and scheduled tramadol at the nursing facility. She did develop significant pain during the transport process for the clinic appointment today which improved by the end of the appointment.     Generalized weakness/recent falls  Patient continues to work with PT/OT at the nursing facility.    - Leukocytosis  No signs or symptoms of infection we'll monitor at this point.    Scheduled with radiation oncology next week.  Return to clinic in 2 weeks with labs. She was advised to return sooner prn.     Chart documentation with Dragon Voice recognition Software. Although reviewed after completion, some words and grammatical errors may remain.  Shen Ray MD  Attending Physician   Hematology/Medical Oncology  "

## 2018-04-06 NOTE — MR AVS SNAPSHOT
After Visit Summary   4/6/2018    Elizabeth Taylor    MRN: [id number]           Patient Information     Date Of Birth          1940        Visit Information        Provider Department      4/6/2018 10:30 AM Shen Ray MD [facility]        Today's Diagnoses     Malignant neoplasm of anal canal (H)    -  1    Diarrhea, unspecified type        Generalized muscle weakness           Follow-ups after your visit        Your next 10 appointments already scheduled     Apr 17, 2018 10:30 AM CDT   Return Visit with GRAHAM Cook CNP   Radiation Oncology Clinic ([facility])    [facility]  1st Floor  [address] [address]   [phone]            Apr 20, 2018 10:00 AM CDT   [facility]  [facility]   [facility] ([facility])    [address] [place] [address]  [address]  [facility] [address]   [phone]            Apr 20, 2018 10:30 AM CDT   (Arrive by 10:15 AM)   Return Visit with Shen Ray MD   [facility] ([facility])    [address]  [address]  [facility] [address]   [phone]            Apr 27, 2018 11:30 AM CDT   Return Visit with Zaire Madison MD   Radiation Oncology Clinic ([facility])    [facility]  1st Floor  [address] [address] [phone]              Who to contact     If you have questions or need follow up information about today's clinic visit or your schedule please contact [facility] directly at [phone].  Normal or non-critical lab and imaging results will be communicated to you by MyChart, letter or phone within 4 business days after the clinic has received the results. If you do not hear from us within 7 days, please contact the clinic through MyChart or phone. If you have a critical  or abnormal lab result, we will notify you by phone as soon as possible.  Submit refill requests through [employer] or call your pharmacy and they will forward the refill request to us. Please allow 3 business days for your refill to be completed.          Additional Information About Your Visit        [employer] Information     [employer] gives you secure access to your electronic health record. If you see a primary care provider, you can also send messages to your care team and make appointments. If you have questions, please call your primary care clinic.  If you do not have a primary care provider, please call [phone] and they will assist you.        Care EveryWhere ID     This is your Care EveryWhere ID. This could be used by other organizations to access your [place] medical records  [id number]        Your Vitals Were     Respirations Pulse Oximetry                20 92%           Blood Pressure from Last 3 Encounters:   No data found for BP    Weight from Last 3 Encounters:   No data found for Wt              Today, you had the following     No orders found for display       Primary Care Provider Office Phone # Fax #    Felisha Dvais [phone]       [address] [facility] [address]        Equal Access to Services     [facility]: Hadii aad ku hadasho Soomaali, waaxda luqadaha, qaybta kaalmada adeegyada, cyndy muñoz hayelly walton . So [facility] [phone].    ATENCIÓN: Si habla español, tiene a ghosh disposición servicios gratuitos de asistencia lingüística. Llame al [phone].    We comply with applicable federal civil rights laws and [place] laws. We do not discriminate on the basis of race, color, national origin, age, disability, sex, sexual orientation, or gender identity.            Thank you!     Thank you for choosing [facility]  for your care. Our goal is always to provide you with excellent care. Hearing back from our patients is one  way we can continue to improve our services. Please take a few minutes to complete the written survey that you may receive in the mail after your visit with us. Thank you!             Your Updated Medication List - Protect others around you: Learn how to safely use, store and throw away your medicines at www.disposemymeds.org.          This list is accurate as of 4/6/18 11:59 PM.  Always use your most recent med list.                   Brand Name Dispense Instructions for use Diagnosis    acetaminophen 325 MG tablet    TYLENOL     Take 2 tablets (650 mg) by mouth every 4 hours as needed for pain    Closed fracture of one rib of right side, initial encounter       artificial saliva Liqd liquid      Swish and spit 5-10 mLs in mouth 4 times daily as needed for dry mouth    Mouth dryness       calcium-vitamin D 600-400 MG-UNIT per tablet    CALTRATE    60 tablet    Take 1 tablet by mouth daily    Age-related osteoporosis with current pathological fracture, initial encounter       diphenoxylate-atropine 2.5-0.025 MG per tablet    LOMOTIL    40 tablet    Take 1 tablet by mouth 4 times daily as needed for diarrhea . If having less than two bowel movements daily, DO NOT GIVE    Diarrhea, unspecified type, Malignant neoplasm of anal canal (H)       hydrocortisone 2.5 % cream    ANUSOL-HC    30 g    Place rectally 2 times daily    External hemorrhoids       loperamide 2 MG tablet    LOPERAMIDE A-D    60 tablet    Take 1 tablet (2 mg) by mouth 4 times daily as needed for diarrhea . If having less than two bowel movements daily, DO NOT GIVE    Malignant neoplasm of anal canal (H)       LORazepam 0.5 MG tablet    ATIVAN    30 tablet    Take 1 tablet (0.5 mg) by mouth every 4 hours as needed (Anxiety, Nausea/Vomiting or Sleep)    Malignant neoplasm of anal canal (H)       magic mouthwash suspension    ENTER INGREDIENTS IN COMMENTS    500 mL    Swish and spit 5-10 mL four times daily as needed    Malignant neoplasm of anal canal  (H)       methocarbamol 500 MG tablet    ROBAXIN    120 tablet    Take 1 tablet (500 mg) by mouth 4 times daily as needed for muscle spasms    Closed fracture of one rib of right side, initial encounter       multivitamin, therapeutic Tabs tablet      Take 1 tablet by mouth daily    Age-related osteoporosis with current pathological fracture, initial encounter       ondansetron 8 MG ODT tab    ZOFRAN-ODT    60 tablet    Take 1 tablet (8 mg) by mouth every 8 hours as needed For nausea    Malignant neoplasm of anal canal (H)       simethicone 40 MG/0.6ML suspension    MYLICON     Take 0.6 mLs (40 mg) by mouth every 6 hours as needed for cramping    Malignant neoplasm of anal canal (H), Flatulence, eructation and gas pain       traMADol 50 MG tablet    ULTRAM    20 tablet    Take 1 tablet (50 mg) by mouth every 6 hours    Closed fracture of one rib of right side, initial encounter       vitamin B complex with vitamin C Tabs tablet      Take 1 tablet by mouth daily

## 2018-04-06 NOTE — LETTER
4/6/2018       RE: Elizabeth Taylor  [address]   [place] [address]     Dear Colleague,    Thank you for referring your patient, Elizabeth Taylor, to the [facility]. Please see a copy of my visit note below.      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 She underwent a colonoscopy in April 2017 which noted to have small anal fissure along with internal hemorrhoids. Symptoms continued and  she was referred to colorectal surgery for further evaluation. On exam she was found to have an 1.5 cm anal lesion on the left side along with left groin palpable adenopathy. Biopsy of anal mass came back positive for squamous cell carcinoma. Patient underwent staging workup with MRI pelvis and a PET scan- showed PET avid left anal canal mass along with small left inguinal FDG avid lymph nodes.     02/12/18- Started on concurrent chemoradiation with 5FU/Mitomycin    03/13/18 Skipped  Mitomycin and proceeded with 5FU at reduced dose given neutropenia/old age    03/19/18 -04/04/18 admitted to the [facility] with intractable diarrhea, nausea, generalized weakness and fall at home resulting in multiple right rib fractures. Hospital course was complicated by small bowel obstruction for which patient was started on TPN. Bowel obstruction was managed conservatively eventually improved prior to discharge with advancement of diet to full liquids along with continuing the TPN . She also developed severe perineal  excoriation from radiation requiring extensive wound care. Also developed   catheter associated UTI requiring antibiotic course as well as hydropneumothorax secondary to underlying rib fractures requiring supplemental o2. She was eventually discharged to transitional care unit    SUBJECTIVE     Presents to the clinic today for hospital discharge FOLLOW UP. She states that she has been doing okay overall since that discharge however during the transport process to the clinic today, complained of worsening rib pain. At the time of my assessment, patient was in bed and appeared much more comfortable. She states that the diarrhea has improved. She has been receiving extensive wound care for perineal area. Complains of generalized fatigue but is working with physical therapy at the nursing facility. Denies fevers chills, cough, dyspnea, sputum production, urinary symptoms or any other complaints        PAST MEDICAL HISTORY     Past Medical History:   Diagnosis Date     Anal cancer (H)      Endometriosis, site unspecified      Thyroiditis, unspecified     Thryoiditis-resolved         CURRENT OUTPATIENT MEDICATIONS     Current Outpatient Prescriptions   Medication Sig Dispense Refill     traMADol (ULTRAM) 50 MG tablet Take 1 tablet (50 mg) by mouth every 6 hours 20 tablet 0     acetaminophen (TYLENOL) 325 MG tablet Take 2 tablets (650 mg) by mouth every 4 hours as needed for pain       LORazepam (ATIVAN) 0.5 MG tablet Take 1 tablet (0.5 mg) by mouth every 4 hours as needed (Anxiety, Nausea/Vomiting or Sleep) 30 tablet 0     diphenoxylate-atropine (LOMOTIL) 2.5-0.025 MG per tablet Take 1 tablet by mouth 4 times daily as needed for diarrhea . If having less than two bowel movements daily, DO NOT GIVE 40 tablet 0     loperamide (LOPERAMIDE A-D) 2 MG tablet Take 1 tablet (2 mg) by mouth 4 times daily as needed for diarrhea . If having less than two bowel movements daily, DO NOT GIVE 60 tablet 3     ondansetron (ZOFRAN-ODT) 8 MG ODT tab Take 1 tablet (8 mg) by mouth every 8 hours as needed For nausea  60 tablet      calcium-vitamin D (CALTRATE) 600-400 MG-UNIT per tablet Take 1 tablet by mouth daily 60 tablet      simethicone (MYLICON) 40 MG/0.6ML suspension Take 0.6 mLs (40 mg) by mouth every 6 hours as needed for cramping       artificial saliva (BIOTENE DRY MOUTHWASH) LIQD liquid Swish and spit 5-10 mLs in mouth 4 times daily as needed for dry mouth       multivitamin, therapeutic (THERA-VIT) TABS tablet Take 1 tablet by mouth daily  0     methocarbamol (ROBAXIN) 500 MG tablet Take 1 tablet (500 mg) by mouth 4 times daily as needed for muscle spasms 120 tablet      magic mouthwash (ENTER INGREDIENTS IN COMMENTS) suspension Swish and spit 5-10 mL four times daily as needed 500 mL 1     vitamin B complex with vitamin C (VITAMIN  B COMPLEX) TABS tablet Take 1 tablet by mouth daily       hydrocortisone (ANUSOL-HC) 2.5 % cream Place rectally 2 times daily 30 g 1        ALLERGIES     Allergies   Allergen Reactions     Darvon [Propoxyphene]      Had reaction in teen years     Penicillins      As a child     Ketoconazole Rash        REVIEW OF SYSTEMS   As above in the HPI, o/w complete 12-point ROS was negative.     PHYSICAL EXAM   B/P: 131/82, T: 98.5, P: 78, R: 16  SpO2 Readings from Last 4 Encounters:   04/04/18 92%   03/17/18 100%   03/17/18 94%   03/15/18 98%     Wt Readings from Last 3 Encounters:   04/03/18 59.6 kg (131 lb 8 oz)   03/19/18 59.3 kg (130 lb 12.8 oz)   03/17/18 55.2 kg (121 lb 11.1 oz)     GEN: frail.  EYES:PERRLA  Mouth/ENT: Oropharynx is clear. Mouth sores  NECK: no cervical or supraclavicular lymphadenopathy  LUNGS: diminished breath sounds right lung base  CV: regular  ABDOMEN: soft, non-tender, non-distended, normal bowel sounds  EXT: warm, well perfused, 1+ edema  NEURO: alert  SKIN: .perineal , genital wound excoriation     LABORATORY AND IMAGING STUDIES     Lab Results   Component Value Date    WBC 13.7 04/06/2018     Lab Results   Component Value Date    RBC 3.55 04/06/2018     Lab  Results   Component Value Date    HGB 10.8 04/06/2018     Lab Results   Component Value Date    HCT 32.4 04/06/2018     No components found for: MCT  Lab Results   Component Value Date    MCV 91 04/06/2018     Lab Results   Component Value Date    MCH 30.4 04/06/2018     Lab Results   Component Value Date    MCHC 33.3 04/06/2018     Lab Results   Component Value Date    RDW 14.6 04/06/2018     Lab Results   Component Value Date     04/06/2018     Recent Labs   Lab Test  04/06/18   1100  04/04/18   0648   NA  134  135   POTASSIUM  3.9  4.0   CHLORIDE  100  100   CO2  28  30   ANIONGAP  6  5   GLC  77  98   BUN  12  13   CR  0.46*  0.45*   ELISE  8.0*  7.7*          ASSESSMENT AND PLAN     77-year-old female with no significant past medical history who presented with complaints of bright red blood per rectum and was recently found to have an anal mass which on biopsy came back for positive for invasive squamous cell carcinoma. Staging workup showed a PET avid anal mass along with hypermetabolic left inguinal adenopathy.     - Squamous cell carcinoma Anal canal  T2N1- IIIA  Started on definitive chemoradiation with infusional FU 1000 mg/m2 on days 1 to 4 and 29 to 32, plus mitomycin 10 mg/m2 on days 1 and 29 (as tolerated), maximum 20 mg per dose- day 1 on 02/12/18  Day 29 03/13/18 Skipped  Mitomycin and proceeded with 5FU at reduced dose given neutropenia/old age.   She completed radiation on 03/23/18  Assessment of clinical response would be performed at 8-12 weeks after completion of treatment with clinical exam    - Perianal wound secondary to recent radiation  Recommend continuation of care to bilateral buttocks, perineum, and perianal area.    - Diarrhea  Secondary to recent chemoradiation. Improved per patient. Continue with Imodium and Lomotil p.r.n.    - Malnutrition  Continue with TPN nutrition with plans to discontinue once oral intake improves.    -Small bowel obstruction - resolved  -  "Hydropneumothorax/hypoxemia / fractured ribs   Continue supple oxygen to keep \"90%. Pain  well controlled with Tylenol prn and scheduled tramadol at the nursing facility. She did develop significant pain during the transport process for the clinic appointment today which improved by the end of the appointment.     Generalized weakness/recent falls  Patient continues to work with PT/OT at the nursing facility.    - Leukocytosis  No signs or symptoms of infection we'll monitor at this point.    Scheduled with radiation oncology next week.  Return to clinic in 2 weeks with labs. She was advised to return sooner prn.     Chart documentation with Dragon Voice recognition Software. Although reviewed after completion, some words and grammatical errors may remain.  Shen Ray MD  Attending Physician   Hematology/Medical Oncology    "

## 2018-04-06 NOTE — NURSING NOTE
Pt brought to 1K due to pain sitting in w/c- unable to use mechanical lift due to lift sheet unsafe. Labs collected and sent and provider will come to room for pt appt. Sheila Garcia

## 2018-04-09 ENCOUNTER — CARE COORDINATION (OUTPATIENT)
Dept: ONCOLOGY | Facility: CLINIC | Age: 78
End: 2018-04-09

## 2018-04-11 NOTE — PROGRESS NOTES
4/11/18:  On 4/9 received call from Seun at pt's TCU requesting clarification of return appts for pt as she needs stretcher transport (re: pain with w/ch); this takes longer to arrange.  Seun reported Dr. Ray wrote that pt needed appt with a JOVANI in one week and see him in 2 weeks (from 4/6).   Looking at pt's appts and your discharge note, told her RN thinking Dr. Ray was referring to rad onc JOVANI appt on 4/17 in addition to appt on 4/20 for lab and to see him.    Today, 4/11/18, RN informed Seun that Dr. Ray did verify he meant the 2 appts already scheduled (4/17 and 4/20) and pt did not need a 3rd appt.  Seun appreciated information.

## 2018-04-17 ENCOUNTER — OFFICE VISIT (OUTPATIENT)
Dept: RADIATION ONCOLOGY | Facility: CLINIC | Age: 78
End: 2018-04-17
Attending: NURSE PRACTITIONER
Payer: MEDICARE

## 2018-04-17 VITALS — OXYGEN SATURATION: 96 % | DIASTOLIC BLOOD PRESSURE: 57 MMHG | HEART RATE: 71 BPM | SYSTOLIC BLOOD PRESSURE: 97 MMHG

## 2018-04-17 DIAGNOSIS — C21.1 MALIGNANT NEOPLASM OF ANAL CANAL (H): Primary | ICD-10-CM

## 2018-04-17 NOTE — MR AVS SNAPSHOT
After Visit Summary   4/17/2018    Elizabeth Taylor    MRN: [id number]           Patient Information     Date Of Birth          1940        Visit Information        Provider Department      4/17/2018 10:30 AM Joanie Gomez APRN CNP Radiation Oncology Clinic        Today's Diagnoses     Malignant neoplasm of anal canal (H)    -  1       Follow-ups after your visit        Your next 10 appointments already scheduled     Apr 20, 2018 10:00 AM CDT   [facility] Lab Draw with  [facility] LAB DRAW   [facility] Lab Draw ([facility])    [address] [place] [address]  [address] [place] [id number] [phone]            Apr 20, 2018 10:30 AM CDT   (Arrive by 10:15 AM)   Return Visit with Shen Ray MD   [facility] Cancer Clinic ([facility])    [address] [place] [address]  [address] [place] [id number] [phone]            Apr 27, 2018 11:30 AM CDT   Return Visit with Zaire Madison MD   Radiation Oncology Clinic ([facility])    [facility]  1st Floor  [address] [facility] [id number] [phone]              Who to contact     Please call your clinic at [phone] to:    Ask questions about your health    Make or cancel appointments    Discuss your medicines    Learn about your test results    Speak to your doctor            Additional Information About Your Visit        MyChart Information     [employer] gives you secure access to your electronic health record. If you see a primary care provider, you can also send messages to your care team and make appointments. If you have questions, please call your primary care clinic.  If you do not have a primary care provider, please call [phone] and they will assist you.      [employer] is an electronic gateway that provides easy, online access to your medical records. With [employer], you can request a clinic appointment, read your  test results, renew a prescription or communicate with your care team.     To access your existing account, please contact your [facility] or call [phone] for assistance.        Care EveryWhere ID     This is your Care EveryWhere ID. This could be used by other organizations to access your [place] medical records  [id number]        Your Vitals Were     Pulse Pulse Oximetry                71 96%           Blood Pressure from Last 3 Encounters:   04/17/18 97/57   04/04/18 123/75   03/19/18 111/77    Weight from Last 3 Encounters:   04/03/18 59.6 kg (131 lb 8 oz)   03/19/18 59.3 kg (130 lb 12.8 oz)   03/17/18 55.2 kg (121 lb 11.1 oz)              Today, you had the following     No orders found for display       Primary Care Provider Office Phone # Fax #    Felisha LAURA Davis [phone]       [address] [facility] [address]        Equal Access to Services     ION[facility] DALEY : Hadii aad ku hadasho Soomaali, waaxda luqadaha, qaybta kaalmada adeegyada, waxay idiin hayaan adeeg addie walton . So [facility] [phone].    ATENCIÓN: Si habla español, tiene a ghosh disposición servicios gratuitos de asistencia lingüística. [facility] [phone].    We comply with applicable federal civil rights laws and [place] laws. We do not discriminate on the basis of race, color, national origin, age, disability, sex, sexual orientation, or gender identity.            Thank you!     Thank you for choosing RADIATION ONCOLOGY CLINIC  for your care. Our goal is always to provide you with excellent care. Hearing back from our patients is one way we can continue to improve our services. Please take a few minutes to complete the written survey that you may receive in the mail after your visit with us. Thank you!             Your Updated Medication List - Protect others around you: Learn how to safely use, store and throw away your medicines at www.disposemymeds.org.          This list  is accurate as of 4/17/18  2:57 PM.  Always use your most recent med list.                   Brand Name Dispense Instructions for use Diagnosis    acetaminophen 325 MG tablet    TYLENOL     Take 2 tablets (650 mg) by mouth every 4 hours as needed for pain    Closed fracture of one rib of right side, initial encounter       artificial saliva Liqd liquid      Swish and spit 5-10 mLs in mouth 4 times daily as needed for dry mouth    Mouth dryness       calcium-vitamin D 600-400 MG-UNIT per tablet    CALTRATE    60 tablet    Take 1 tablet by mouth daily    Age-related osteoporosis with current pathological fracture, initial encounter       diphenoxylate-atropine 2.5-0.025 MG per tablet    LOMOTIL    40 tablet    Take 1 tablet by mouth 4 times daily as needed for diarrhea . If having less than two bowel movements daily, DO NOT GIVE    Diarrhea, unspecified type, Malignant neoplasm of anal canal (H)       hydrocortisone 2.5 % cream    ANUSOL-HC    30 g    Place rectally 2 times daily    External hemorrhoids       loperamide 2 MG tablet    LOPERAMIDE A-D    60 tablet    Take 1 tablet (2 mg) by mouth 4 times daily as needed for diarrhea . If having less than two bowel movements daily, DO NOT GIVE    Malignant neoplasm of anal canal (H)       LORazepam 0.5 MG tablet    ATIVAN    30 tablet    Take 1 tablet (0.5 mg) by mouth every 4 hours as needed (Anxiety, Nausea/Vomiting or Sleep)    Malignant neoplasm of anal canal (H)       magic mouthwash suspension    ENTER INGREDIENTS IN COMMENTS    500 mL    Swish and spit 5-10 mL four times daily as needed    Malignant neoplasm of anal canal (H)       methocarbamol 500 MG tablet    ROBAXIN    120 tablet    Take 1 tablet (500 mg) by mouth 4 times daily as needed for muscle spasms    Closed fracture of one rib of right side, initial encounter       multivitamin, therapeutic Tabs tablet      Take 1 tablet by mouth daily    Age-related osteoporosis with current pathological fracture,  initial encounter       ondansetron 8 MG ODT tab    ZOFRAN-ODT    60 tablet    Take 1 tablet (8 mg) by mouth every 8 hours as needed For nausea    Malignant neoplasm of anal canal (H)       simethicone 40 MG/0.6ML suspension    MYLICON     Take 0.6 mLs (40 mg) by mouth every 6 hours as needed for cramping    Malignant neoplasm of anal canal (H), Flatulence, eructation and gas pain       traMADol 50 MG tablet    ULTRAM    20 tablet    Take 1 tablet (50 mg) by mouth every 6 hours    Closed fracture of one rib of right side, initial encounter       vitamin B complex with vitamin C Tabs tablet      Take 1 tablet by mouth daily

## 2018-04-17 NOTE — PROGRESS NOTES
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    RADIATION THERAPY DELIVERED:   5,400 cGy to anal canal, 5,040 to left groin and 4,500 cGy to pelvis and right groin given with concurrent Mitomycin-C and 5-FU.    INTERVAL SINCE COMPLETION OF RADIATION THERAPY:   Radiation completed 3/23/2018.  2.5 weeks ago    HPI:   Ms. 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 Staging evaluation revealed a 2.5 cm mass located approximately 1 cm from the anal verge with involvement of the internal anal sphincter but not external sphincter or levator ani in addition to two FDG-avid left inguinal lymph nodes.      Ms. Taylor received definitive chemoradiotherapy with curative intent. The primary tumor was treated to a total dose of 54 Gy while the node-positive left groin received 50.4 Gy with the right groin and pelvis treated to 45 Gy in 30 daily fractions using a simultaneous integrated boost technique. Radiation was delivered with concurrent Mitomycin-C and infusional 5-FU with Mitomycin held with her second cycle of therapy due to a concern for treatment-related toxicities given her age and borderline functional status.        Ms. Taylor tolerated the initiation of chemoradiotherapy reasonably well with relatively mild acute treatment-related toxicities. Approximately midway through her radiotherapy treatment course, she developed worsening diarrhea which was initially managed with loperamide and escalated to tincture of opium with IV fluid rehydration when she continued to have >6-8 bowel movements per day. Despite escalation in her medical management, she continued to have intractable diarrhea and the decision was made  to admit her at the beginning of her final week of radiotherapy on 3/19/2018 for further evaluation and cares due to her uncontrolled symptoms, declining functional status and concern for her ability to care for herself after she suffered a fall while at home. She completed the remainder of her radiotherapy treatment as an inpatient without incident. Overall, she did not have any unplanned breaks in radiotherapy.        SUBJECTIVE:   Elizabeth Taylor is a 77 year old female who is here today for routine 2 week follow up after completing radiation therapy. She remains at [facility] for TCU and arrived on [facility] via ambulance transport.  She had a challenging last half of treatment with diarrhea, dehydration, weakness and a fall.  She feels they are overall taking good care of her.  She likes the food and is eating well. Denies any nausea or vomiting.  She is participating in PT and OT and is very slowly improving.  Yesterday they were able to get her to walk with help to the bathroom and she said that is the first time she has done that in many weeks.  The diarrhea has significantly improved just recently.  She had one large bowel movement yesterday, but didn't have any for a few days before that.  They are doing skin care 2-4 times per day and her skin is feeling better, but is not healed.  She does have a pressure ulcer on coccyx now, but they have changed her bed and are doing dressing changes on that as well.  She still has a farias catheter in to avoid any urine on the skin and because it was difficult for her to get up to use the toilet or sit on a bed pain.  She is not ready to have that removed.  She continues to have significant fatigue and remains very weak.  After her OT and trip to the bathroom yesterday she slept for hours.  She us taking tramadol for pain.  Her skin remains painful at times, but lidocaine helps.  Her ribs and back also still cause her some pain, but tramadol is helping.   She remains on oxygen.  It also looks like she may still be receiving TPN.    ROS:  Complete review of systems is negative except for symptoms discussed in subjective above.    Current Outpatient Prescriptions   Medication     traMADol (ULTRAM) 50 MG tablet     acetaminophen (TYLENOL) 325 MG tablet     LORazepam (ATIVAN) 0.5 MG tablet     diphenoxylate-atropine (LOMOTIL) 2.5-0.025 MG per tablet     loperamide (LOPERAMIDE A-D) 2 MG tablet     ondansetron (ZOFRAN-ODT) 8 MG ODT tab     calcium-vitamin D (CALTRATE) 600-400 MG-UNIT per tablet     simethicone (MYLICON) 40 MG/0.6ML suspension     artificial saliva (BIOTENE DRY MOUTHWASH) LIQD liquid     multivitamin, therapeutic (THERA-VIT) TABS tablet     methocarbamol (ROBAXIN) 500 MG tablet     magic mouthwash (ENTER INGREDIENTS IN COMMENTS) suspension     vitamin B complex with vitamin C (VITAMIN  B COMPLEX) TABS tablet     hydrocortisone (ANUSOL-HC) 2.5 % cream     No current facility-administered medications for this visit.           Allergies   Allergen Reactions     Darvon [Propoxyphene]      Had reaction in teen years     Penicillins      As a child     Ketoconazole Rash       Past Medical History:   Diagnosis Date     Anal cancer (H)      Endometriosis, site unspecified      Thyroiditis, unspecified     Thryoiditis-resolved         PHYSICAL EXAM:  There were no vitals taken for this visit.  Gen: Alert, in NAD, very thin and frail appearing.  Comfortable on litter.  Pulm: No wheezing, stridor or respiratory distress  CV: Well-perfused, no cyanosis, no pedal edema  Skin: Groin shows some areas that are completely healed, but grade 2 desquamation in small area of groin on right, large area on left and labia/perineum mostly involved with sign of healing but remain grade 2 moist desquamation.  There is also moderate edema of labia L>R.  There is a pressure ulcer on coccyx that is about 3cm.  No signs of infection currently.  Psychiatric: Appropriate mood and affect.   Was a good historian, but couldn't remember the name of the care facility.      LABS AND IMAGING:  Reviewed.    IMPRESSION:   Ms. Taylor is a 77 year old female with a stage IIIA SCC of anal canal with significant treatment related toxicities but is very slowly recovering in TCU.    PLAN:   Patient has seen significant improvement in diarrhea.  Orders are to give anti-diarrhea medication after 1 loose stool.  Skin is showing sign of healing but is far from completely healed.  Continue current skin care (2-4 times per day).  They are using elfego bottle for cleaning and proshield/barrier cream.   Encouraged to continue therapy and work on her own while in bed to improve strength.  Continue pushing calories and fluids. Pain is improving.   Functional status remains very poor but seems to be slowly improving.  Patient was never given a vaginal dilator.  Her skin is not in condition for a dilator nor is her functional capacity good enough for her to be using a dilator at this point.  Dr. Madison will address as next visit.  I did discuss this with the patient.    She will see Dr. Ray on 4/20 and Dr. Madison on 4/27.    Joanie Gomez NP  [facility]  [facility]    CC:  Patient Care Team:  Felisha Davis NP as PCP - General (Family Practice)  Shen Ray MD as MD (Hematology & Oncology)  Pauline Aguayo, MADELEINE as Nurse Coordinator (Hematology & Oncology)  Mercedes Duque RN as Clinic Care Coordinator (Primary Care - CC)  Zaire Madison MD as MD (Radiation Oncology)

## 2018-04-17 NOTE — LETTER
2018       RE: Elizabeth Taylor  [address]   [place] [address]     Dear Colleague,    Thank you for referring your patient, Elizabeth Taylor, to the RADIATION ONCOLOGY CLINIC. Please see a copy of my visit note below.    Radiation Oncology Follow-up Visit  2018    Elizabeth Taylor  MRN: [id number]   : 1940     DISEASE TREATED:   Stage IIIA [id number] Squamous cell carcinoma of the anal canal.    RADIATION THERAPY DELIVERED:   5,400 cGy to anal canal, 5,040 to left groin and 4,500 cGy to pelvis and right groin given with concurrent Mitomycin-C and 5-FU.    INTERVAL SINCE COMPLETION OF RADIATION THERAPY:   Radiation completed 3/23/2018.  2.5 weeks ago    HPI:   Ms. 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 Staging evaluation revealed a 2.5 cm mass located approximately 1 cm from the anal verge with involvement of the internal anal sphincter but not external sphincter or levator ani in addition to two FDG-avid left inguinal lymph nodes.      Ms. Taylor received definitive chemoradiotherapy with curative intent. The primary tumor was treated to a total dose of 54 Gy while the node-positive left groin received 50.4 Gy with the right groin and pelvis treated to 45 Gy in 30 daily fractions using a simultaneous integrated boost technique. Radiation was delivered with concurrent Mitomycin-C and infusional 5-FU with Mitomycin held with her second cycle of therapy due to a concern for treatment-related toxicities given her age and borderline functional status.        Ms. Taylor tolerated the initiation of chemoradiotherapy reasonably well with relatively mild acute treatment-related toxicities. Approximately midway through her radiotherapy treatment course, she developed worsening diarrhea which was initially  managed with loperamide and escalated to tincture of opium with IV fluid rehydration when she continued to have >6-8 bowel movements per day. Despite escalation in her medical management, she continued to have intractable diarrhea and the decision was made to admit her at the beginning of her final week of radiotherapy on 3/19/2018 for further evaluation and cares due to her uncontrolled symptoms, declining functional status and concern for her ability to care for herself after she suffered a fall while at home. She completed the remainder of her radiotherapy treatment as an inpatient without incident. Overall, she did not have any unplanned breaks in radiotherapy.        SUBJECTIVE:   Elizabeth Taylor is a 77 year old female who is here today for routine 2 week follow up after completing radiation therapy. She remains at [facility] for TCU and arrived [place] via ambulance transport.  She had a challenging last half of treatment with diarrhea, dehydration, weakness and a fall.  She feels they are overall taking good care of her.  She likes the food and is eating well. Denies any nausea or vomiting.  She is participating in PT and OT and is very slowly improving.  Yesterday they were able to get her to walk with help to the bathroom and she said that is the first time she has done that in many weeks.  The diarrhea has significantly improved just recently.  She had one large bowel movement yesterday, but didn't have any for a few days before that.  They are doing skin care 2-4 times per day and her skin is feeling better, but is not healed.  She does have a pressure ulcer on coccyx now, but they have changed her bed and are doing dressing changes on that as well.  She still has a farias catheter in to avoid any urine on the skin and because it was difficult for her to get up to use the toilet or sit on a bed pain.  She is not ready to have that removed.  She continues to have significant fatigue and  remains very weak.  After her OT and trip to the bathroom yesterday she slept for hours.  She us taking tramadol for pain.  Her skin remains painful at times, but lidocaine helps.  Her ribs and back also still cause her some pain, but tramadol is helping.  She remains on oxygen.  It also looks like she may still be receiving TPN.    ROS:  Complete review of systems is negative except for symptoms discussed in subjective above.    Current Outpatient Prescriptions   Medication     traMADol (ULTRAM) 50 MG tablet     acetaminophen (TYLENOL) 325 MG tablet     LORazepam (ATIVAN) 0.5 MG tablet     diphenoxylate-atropine (LOMOTIL) 2.5-0.025 MG per tablet     loperamide (LOPERAMIDE A-D) 2 MG tablet     ondansetron (ZOFRAN-ODT) 8 MG ODT tab     calcium-vitamin D (CALTRATE) 600-400 MG-UNIT per tablet     simethicone (MYLICON) 40 MG/0.6ML suspension     artificial saliva (BIOTENE DRY MOUTHWASH) LIQD liquid     multivitamin, therapeutic (THERA-VIT) TABS tablet     methocarbamol (ROBAXIN) 500 MG tablet     magic mouthwash (ENTER INGREDIENTS IN COMMENTS) suspension     vitamin B complex with vitamin C (VITAMIN  B COMPLEX) TABS tablet     hydrocortisone (ANUSOL-HC) 2.5 % cream     No current facility-administered medications for this visit.           Allergies   Allergen Reactions     Darvon [Propoxyphene]      Had reaction in teen years     Penicillins      As a child     Ketoconazole Rash       Past Medical History:   Diagnosis Date     Anal cancer (H)      Endometriosis, site unspecified      Thyroiditis, unspecified     Thryoiditis-resolved         PHYSICAL EXAM:  There were no vitals taken for this visit.  Gen: Alert, in NAD, very thin and frail appearing.  Comfortable on litter.  Pulm: No wheezing, stridor or respiratory distress  CV: Well-perfused, no cyanosis, no pedal edema  Skin: Groin shows some areas that are completely healed, but grade 2 desquamation in small area of groin on right, large area on left and  labia/perineum mostly involved with sign of healing but remain grade 2 moist desquamation.  There is also moderate edema of labia L>R.  There is a pressure ulcer on coccyx that is about 3cm.  No signs of infection currently.  Psychiatric: Appropriate mood and affect.  Was a good historian, but couldn't remember the name of the care facility.      LABS AND IMAGING:  Reviewed.    IMPRESSION:   Ms. Taylor is a 77 year old female with a stage IIIA SCC of anal canal with significant treatment related toxicities but is very slowly recovering in TCU.    PLAN:   Patient has seen significant improvement in diarrhea.  Orders are to give anti-diarrhea medication after 1 loose stool.  Skin is showing sign of healing but is far from completely healed.  Continue current skin care (2-4 times per day).  They are using elfego bottle for cleaning and proshield/barrier cream.   Encouraged to continue therapy and work on her own while in bed to improve strength.  Continue pushing calories and fluids. Pain is improving.   Functional status remains very poor but seems to be slowly improving.  Patient was never given a vaginal dilator.  Her skin is not in condition for a dilator nor is her functional capacity good enough for her to be using a dilator at this point.  Dr. Madison will address as next visit.  I did discuss this with the patient.    She will see Dr. Ray on 4/20 and Dr. Madison on 4/27.    Joanie Gomez NP  [facility]  [facility]    CC:  Patient Care Team:  Felisha Davis NP as PCP - General (Family Practice)  Shen Ray MD as MD (Hematology & Oncology)  Pauline Aguayo RN as Nurse Coordinator (Hematology & Oncology)  Mercedes Duque RN as Clinic Care Coordinator (Primary Care - CC)  Zaire Madison MD as MD (Radiation Oncology)

## 2018-04-20 ENCOUNTER — ONCOLOGY VISIT (OUTPATIENT)
Dept: ONCOLOGY | Facility: CLINIC | Age: 78
End: 2018-04-20
Attending: INTERNAL MEDICINE
Payer: MEDICARE

## 2018-04-20 ENCOUNTER — APPOINTMENT (OUTPATIENT)
Dept: LAB | Facility: CLINIC | Age: 78
End: 2018-04-20
Attending: INTERNAL MEDICINE
Payer: MEDICARE

## 2018-04-20 VITALS
DIASTOLIC BLOOD PRESSURE: 68 MMHG | HEIGHT: 65 IN | HEART RATE: 98 BPM | SYSTOLIC BLOOD PRESSURE: 109 MMHG | RESPIRATION RATE: 16 BRPM | OXYGEN SATURATION: 91 % | BODY MASS INDEX: 19.83 KG/M2 | TEMPERATURE: 98.2 F | WEIGHT: 119 LBS

## 2018-04-20 DIAGNOSIS — C21.1 MALIGNANT NEOPLASM OF ANAL CANAL (H): ICD-10-CM

## 2018-04-20 LAB
ALBUMIN SERPL-MCNC: 1.5 G/DL (ref 3.4–5)
ALP SERPL-CCNC: 152 U/L (ref 40–150)
ALT SERPL W P-5'-P-CCNC: 28 U/L (ref 0–50)
ANION GAP SERPL CALCULATED.3IONS-SCNC: 7 MMOL/L (ref 3–14)
AST SERPL W P-5'-P-CCNC: 22 U/L (ref 0–45)
BASOPHILS # BLD AUTO: 0 10E9/L (ref 0–0.2)
BASOPHILS NFR BLD AUTO: 0.4 %
BILIRUB SERPL-MCNC: 0.3 MG/DL (ref 0.2–1.3)
BUN SERPL-MCNC: 18 MG/DL (ref 7–30)
CALCIUM SERPL-MCNC: 7.7 MG/DL (ref 8.5–10.1)
CHLORIDE SERPL-SCNC: 104 MMOL/L (ref 94–109)
CO2 SERPL-SCNC: 28 MMOL/L (ref 20–32)
CREAT SERPL-MCNC: 0.37 MG/DL (ref 0.52–1.04)
DIFFERENTIAL METHOD BLD: ABNORMAL
EOSINOPHIL # BLD AUTO: 0.3 10E9/L (ref 0–0.7)
EOSINOPHIL NFR BLD AUTO: 4.1 %
ERYTHROCYTE [DISTWIDTH] IN BLOOD BY AUTOMATED COUNT: 15.8 % (ref 10–15)
GFR SERPL CREATININE-BSD FRML MDRD: >90 ML/MIN/1.7M2
GLUCOSE SERPL-MCNC: 107 MG/DL (ref 70–99)
HCT VFR BLD AUTO: 26.5 % (ref 35–47)
HGB BLD-MCNC: 8.6 G/DL (ref 11.7–15.7)
IMM GRANULOCYTES # BLD: 0.1 10E9/L (ref 0–0.4)
IMM GRANULOCYTES NFR BLD: 1.4 %
LYMPHOCYTES # BLD AUTO: 0.7 10E9/L (ref 0.8–5.3)
LYMPHOCYTES NFR BLD AUTO: 8.9 %
MCH RBC QN AUTO: 29.9 PG (ref 26.5–33)
MCHC RBC AUTO-ENTMCNC: 32.5 G/DL (ref 31.5–36.5)
MCV RBC AUTO: 92 FL (ref 78–100)
MONOCYTES # BLD AUTO: 0.7 10E9/L (ref 0–1.3)
MONOCYTES NFR BLD AUTO: 9.1 %
NEUTROPHILS # BLD AUTO: 5.6 10E9/L (ref 1.6–8.3)
NEUTROPHILS NFR BLD AUTO: 76.1 %
NRBC # BLD AUTO: 0 10*3/UL
NRBC BLD AUTO-RTO: 0 /100
PLATELET # BLD AUTO: 326 10E9/L (ref 150–450)
POTASSIUM SERPL-SCNC: 3.5 MMOL/L (ref 3.4–5.3)
PROT SERPL-MCNC: 5.3 G/DL (ref 6.8–8.8)
RBC # BLD AUTO: 2.88 10E12/L (ref 3.8–5.2)
SODIUM SERPL-SCNC: 140 MMOL/L (ref 133–144)
WBC # BLD AUTO: 7.4 10E9/L (ref 4–11)

## 2018-04-20 PROCEDURE — 85025 COMPLETE CBC W/AUTO DIFF WBC: CPT | Performed by: INTERNAL MEDICINE

## 2018-04-20 PROCEDURE — 36591 DRAW BLOOD OFF VENOUS DEVICE: CPT

## 2018-04-20 PROCEDURE — 25000128 H RX IP 250 OP 636: Mod: ZF | Performed by: INTERNAL MEDICINE

## 2018-04-20 PROCEDURE — 80053 COMPREHEN METABOLIC PANEL: CPT | Performed by: INTERNAL MEDICINE

## 2018-04-20 PROCEDURE — 99214 OFFICE O/P EST MOD 30 MIN: CPT | Mod: ZP | Performed by: INTERNAL MEDICINE

## 2018-04-20 PROCEDURE — G0463 HOSPITAL OUTPT CLINIC VISIT: HCPCS | Mod: ZF

## 2018-04-20 RX ORDER — HEPARIN SODIUM (PORCINE) LOCK FLUSH IV SOLN 100 UNIT/ML 100 UNIT/ML
5 SOLUTION INTRAVENOUS ONCE
Status: COMPLETED | OUTPATIENT
Start: 2018-04-20 | End: 2018-04-20

## 2018-04-20 RX ADMIN — SODIUM CHLORIDE, PRESERVATIVE FREE 5 ML: 5 INJECTION INTRAVENOUS at 10:56

## 2018-04-20 ASSESSMENT — PAIN SCALES - GENERAL: PAINLEVEL: NO PAIN (0)

## 2018-04-20 NOTE — NURSING NOTE
Chief Complaint   Patient presents with     Oncology Clinic Visit     Anal Cancer     Port Draw     labs drawn from port by RN     Current needle removed, site cleaned.  Port accessed, labs drawn, flushed with NS & heparin.  Natividad Oglesby RN

## 2018-04-20 NOTE — NURSING NOTE
"Oncology Rooming Note    April 20, 2018 11:02 AM   Elizabeth Taylor is a 77 year old female who presents for:    Chief Complaint   Patient presents with     Oncology Clinic Visit     Anal Cancer     Port Draw     labs drawn from port by RN     Initial Vitals: /68  Pulse 98  Temp 98.2  F (36.8  C) (Oral)  Resp 16  Ht 1.651 m (5' 5\")  Wt 54 kg (119 lb)  SpO2 91%  BMI 19.8 kg/m2 Estimated body mass index is 19.8 kg/(m^2) as calculated from the following:    Height as of this encounter: 1.651 m (5' 5\").    Weight as of this encounter: 54 kg (119 lb). Body surface area is 1.57 meters squared.  No Pain (0) Comment: Data Unavailable   No LMP recorded. Patient is postmenopausal.  Allergies reviewed: Yes  Medications reviewed: Yes    Medications: Medication refills not needed today.  Pharmacy name entered into [employer]:    [facility] - [place], [place] - [address]  [facility] - [place], [place] - [address]    Clinical concerns: No New Concerns    5 minutes for nursing intake (face to face time)     LEATHA Ramos    "

## 2018-04-20 NOTE — PROGRESS NOTES
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 She underwent a colonoscopy in April 2017 which noted to have small anal fissure along with internal hemorrhoids. Symptoms continued and  she was referred to colorectal surgery for further evaluation. On exam she was found to have an 1.5 cm anal lesion on the left side along with left groin palpable adenopathy. Biopsy of anal mass came back positive for squamous cell carcinoma. Patient underwent staging workup with MRI pelvis and a PET scan- showed PET avid left anal canal mass along with small left inguinal FDG avid lymph nodes.     02/12/18- Started on concurrent chemoradiation with 5FU/Mitomycin    03/13/18 Skipped  Mitomycin and proceeded with 5FU at reduced dose given neutropenia/old age    03/19/18 -04/04/18 admitted to the [facility] with intractable diarrhea, nausea, generalized weakness and fall at home resulting in multiple right rib fractures. Hospital course was complicated by small bowel obstruction for which patient was started on TPN. Bowel obstruction was managed conservatively eventually improved prior to discharge with advancement of diet to full liquids along with continuing the TPN . She also developed severe perineal  excoriation from radiation requiring extensive wound care. Also developed  catheter associated UTI requiring antibiotic course as well as hydropneumothorax secondary to underlying rib fractures requiring supplemental o2. She was eventually discharged to transitional care unit    SUBJECTIVE      She is in clinic today for a two-week  follow-up.  States that her energy levels have improved from the last appointment and she is sammi to Walk for upto to 3 hours.  Diarrhea has improved significantly.   She is eating much better.  Continues to have skin care at the rehabilitation facility and per patient  Pain is much better controlled.  Denies fevers/chills, nausea/vomiting, abdominal pain, chest pin or any other compaints        PAST MEDICAL HISTORY     Past Medical History:   Diagnosis Date     Anal cancer (H)      Endometriosis, site unspecified      Thyroiditis, unspecified     Thryoiditis-resolved         CURRENT OUTPATIENT MEDICATIONS     Current Outpatient Prescriptions   Medication Sig Dispense Refill     acetaminophen (TYLENOL) 325 MG tablet Take 2 tablets (650 mg) by mouth every 4 hours as needed for pain       artificial saliva (BIOTENE DRY MOUTHWASH) LIQD liquid Swish and spit 5-10 mLs in mouth 4 times daily as needed for dry mouth       calcium-vitamin D (CALTRATE) 600-400 MG-UNIT per tablet Take 1 tablet by mouth daily 60 tablet      diphenoxylate-atropine (LOMOTIL) 2.5-0.025 MG per tablet Take 1 tablet by mouth 4 times daily as needed for diarrhea . If having less than two bowel movements daily, DO NOT GIVE 40 tablet 0     hydrocortisone (ANUSOL-HC) 2.5 % cream Place rectally 2 times daily 30 g 1     loperamide (LOPERAMIDE A-D) 2 MG tablet Take 1 tablet (2 mg) by mouth 4 times daily as needed for diarrhea . If having less than two bowel movements daily, DO NOT GIVE 60 tablet 3     LORazepam (ATIVAN) 0.5 MG tablet Take 1 tablet (0.5 mg) by mouth every 4 hours as needed (Anxiety, Nausea/Vomiting or Sleep) 30 tablet 0     magic mouthwash (ENTER INGREDIENTS IN COMMENTS) suspension Swish and spit 5-10 mL four times daily as needed 500 mL 1     methocarbamol (ROBAXIN) 500 MG tablet Take 1 tablet (500 mg) by mouth 4 times daily as needed for muscle spasms 120 tablet      ondansetron (ZOFRAN-ODT) 8 MG ODT tab Take 1 tablet (8 mg) by mouth every  8 hours as needed For nausea 60 tablet      simethicone (MYLICON) 40 MG/0.6ML suspension Take 0.6 mLs (40 mg) by mouth every 6 hours as needed for cramping       traMADol (ULTRAM) 50 MG tablet Take 1 tablet (50 mg) by mouth every 6 hours 20 tablet 0     multivitamin, therapeutic (THERA-VIT) TABS tablet Take 1 tablet by mouth daily (Patient not taking: Reported on 4/20/2018)  0     vitamin B complex with vitamin C (VITAMIN  B COMPLEX) TABS tablet Take 1 tablet by mouth daily          ALLERGIES     Allergies   Allergen Reactions     Darvon [Propoxyphene]      Had reaction in teen years     Penicillins      As a child     Ketoconazole Rash        REVIEW OF SYSTEMS   As above in the HPI, o/w complete 12-point ROS was negative.     PHYSICAL EXAM   B/P: 131/82, T: 98.5, P: 78, R: 16  SpO2 Readings from Last 4 Encounters:   04/20/18 91%   04/17/18 96%   04/06/18 92%   04/04/18 92%     Wt Readings from Last 3 Encounters:   04/20/18 54 kg (119 lb)   04/03/18 59.6 kg (131 lb 8 oz)   03/19/18 59.3 kg (130 lb 12.8 oz)     GEN: frail. No distress  EYES:PERRLA  Mouth/ENT: Oropharynx is clear.   NECK: no cervical or supraclavicular lymphadenopathy  LUNGS: diminished breath sounds right lung base  CV: regular  ABDOMEN: soft, non-tender, non-distended, normal bowel sounds  EXT: warm, well perfused, 1+ edema  NEURO: alert  SKIN: .perineal , genital wound excoriation,  Sacral pressure ulcer- improved     LABORATORY AND IMAGING STUDIES     Lab Results   Component Value Date    WBC 7.4 04/20/2018     Lab Results   Component Value Date    RBC 2.88 04/20/2018     Lab Results   Component Value Date    HGB 8.6 04/20/2018     Lab Results   Component Value Date    HCT 26.5 04/20/2018     No components found for: MCT  Lab Results   Component Value Date    MCV 92 04/20/2018     Lab Results   Component Value Date    MCH 29.9 04/20/2018     Lab Results   Component Value Date    MCHC 32.5 04/20/2018     Lab Results   Component Value Date    RDW  15.8 04/20/2018     Lab Results   Component Value Date     04/20/2018     Recent Labs   Lab Test  04/20/18   1055  04/06/18   1100   NA  140  134   POTASSIUM  3.5  3.9   CHLORIDE  104  100   CO2  28  28   ANIONGAP  7  6   GLC  107*  77   BUN  18  12   CR  0.37*  0.46*   ELISE  7.7*  8.0*          ASSESSMENT AND PLAN     77-year-old female with no significant past medical history who presented with complaints of bright red blood per rectum and was recently found to have an anal mass which on biopsy came back for positive for invasive squamous cell carcinoma. Staging workup showed a PET avid anal mass along with hypermetabolic left inguinal adenopathy.     - Squamous cell carcinoma Anal canal  T2N1- IIIA  Started on definitive chemoradiation with infusional FU 1000 mg/m2 on days 1 to 4 and 29 to 32, plus mitomycin 10 mg/m2 on days 1 and 29 (as tolerated), maximum 20 mg per dose- day 1 on 02/12/18  Day 29 03/13/18 Skipped mitomycin and proceeded with 5FU at reduced dose given neutropenia/old age.  03/19/18 -04/04/18 Developed intractable diarrhea, fatigue and dermatitis in the later half of treatment and had to be admitted. Was eventually discharged and is currently at a rehab facility.   Continues to improve  Assessment of clinical response would be performed at 8-12 weeks after completion of treatment with clinical exam and BRITTNEY    - Perianal wound secondary to recent radiation  Improving. Recommend continuation of care to bilateral buttocks, perineum, and perianal area.    - Diarrhea  Secondary to recent chemoradiation. Improved per patient.    - Malnutrition  Oral intake has improved. Currently on TPN nutrition with plans to discontinue once nutritional status improves.  - Small bowel obstruction - resolved  - Hydropneumothorax/hypoxemia / fractured ribs    Pain well controlled with Tylenol prn and scheduled tramadol at the nursing facility.   -Generalized weakness  Patient continues to work with PT/OT at the  nursing facility. Walked for 3 hours yesterday  Scheduled with radiation oncology next week.  Return to clinic in 2 weeks with labs with JOVANI. RTC in 4 weeks with me. She was advised to return sooner prn.   Chart documentation with Dragon Voice recognition Software. Although reviewed after completion, some words and grammatical errors may remain.  Shen Ray MD  Attending Physician   Hematology/Medical Oncology

## 2018-04-20 NOTE — LETTER
4/20/2018       RE: Elizabeth Taylor  [address] [facility] [address]     Dear Colleague,    Thank you for referring your patient, Elizabeth Taylor, to the [facility]. Please see a copy of my visit note below.      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 She underwent a colonoscopy in April 2017 which noted to have small anal fissure along with internal hemorrhoids. Symptoms continued and  she was referred to colorectal surgery for further evaluation. On exam she was found to have an 1.5 cm anal lesion on the left side along with left groin palpable adenopathy. Biopsy of anal mass came back positive for squamous cell carcinoma. Patient underwent staging workup with MRI pelvis and a PET scan- showed PET avid left anal canal mass along with small left inguinal FDG avid lymph nodes.     02/12/18- Started on concurrent chemoradiation with 5FU/Mitomycin    03/13/18 Skipped  Mitomycin and proceeded with 5FU at reduced dose given neutropenia/old age    03/19/18 -04/04/18 admitted to the [facility] with intractable diarrhea, nausea, generalized weakness and fall at home resulting in multiple right rib fractures. Hospital course was complicated by small bowel obstruction for which patient was started on TPN. Bowel obstruction was managed conservatively eventually improved prior to discharge with advancement of diet to full liquids along with continuing the TPN . She also developed severe perineal  excoriation from radiation requiring extensive wound care. Also developed   catheter associated UTI requiring antibiotic course as well as hydropneumothorax secondary to underlying rib fractures requiring supplemental o2. She was eventually discharged to transitional care unit    SUBJECTIVE      She is in clinic today for a two-week follow-up.  States that her energy levels have improved from the last appointment and she is sammi to Walk for upto to 3 hours.  Diarrhea has improved significantly.   She is eating much better.  Continues to have skin care at the rehabilitation facility and per patient  Pain is much better controlled.  Denies fevers/chills, nausea/vomiting, abdominal pain, chest pin or any other compaints        PAST MEDICAL HISTORY     Past Medical History:   Diagnosis Date     Anal cancer (H)      Endometriosis, site unspecified      Thyroiditis, unspecified     Thryoiditis-resolved         CURRENT OUTPATIENT MEDICATIONS     Current Outpatient Prescriptions   Medication Sig Dispense Refill     acetaminophen (TYLENOL) 325 MG tablet Take 2 tablets (650 mg) by mouth every 4 hours as needed for pain       artificial saliva (BIOTENE DRY MOUTHWASH) LIQD liquid Swish and spit 5-10 mLs in mouth 4 times daily as needed for dry mouth       calcium-vitamin D (CALTRATE) 600-400 MG-UNIT per tablet Take 1 tablet by mouth daily 60 tablet      diphenoxylate-atropine (LOMOTIL) 2.5-0.025 MG per tablet Take 1 tablet by mouth 4 times daily as needed for diarrhea . If having less than two bowel movements daily, DO NOT GIVE 40 tablet 0     hydrocortisone (ANUSOL-HC) 2.5 % cream Place rectally 2 times daily 30 g 1     loperamide (LOPERAMIDE A-D) 2 MG tablet Take 1 tablet (2 mg) by mouth 4 times daily as needed for diarrhea . If having less than two bowel movements daily, DO NOT GIVE 60 tablet 3     LORazepam (ATIVAN) 0.5 MG tablet Take 1 tablet (0.5 mg) by mouth every 4 hours as needed (Anxiety, Nausea/Vomiting or Sleep) 30 tablet 0     magic mouthwash (ENTER INGREDIENTS IN COMMENTS) suspension Swish  and spit 5-10 mL four times daily as needed 500 mL 1     methocarbamol (ROBAXIN) 500 MG tablet Take 1 tablet (500 mg) by mouth 4 times daily as needed for muscle spasms 120 tablet      ondansetron (ZOFRAN-ODT) 8 MG ODT tab Take 1 tablet (8 mg) by mouth every 8 hours as needed For nausea 60 tablet      simethicone (MYLICON) 40 MG/0.6ML suspension Take 0.6 mLs (40 mg) by mouth every 6 hours as needed for cramping       traMADol (ULTRAM) 50 MG tablet Take 1 tablet (50 mg) by mouth every 6 hours 20 tablet 0     multivitamin, therapeutic (THERA-VIT) TABS tablet Take 1 tablet by mouth daily (Patient not taking: Reported on 4/20/2018)  0     vitamin B complex with vitamin C (VITAMIN  B COMPLEX) TABS tablet Take 1 tablet by mouth daily          ALLERGIES     Allergies   Allergen Reactions     Darvon [Propoxyphene]      Had reaction in teen years     Penicillins      As a child     Ketoconazole Rash        REVIEW OF SYSTEMS   As above in the HPI, o/w complete 12-point ROS was negative.     PHYSICAL EXAM   B/P: 131/82, T: 98.5, P: 78, R: 16  SpO2 Readings from Last 4 Encounters:   04/20/18 91%   04/17/18 96%   04/06/18 92%   04/04/18 92%     Wt Readings from Last 3 Encounters:   04/20/18 54 kg (119 lb)   04/03/18 59.6 kg (131 lb 8 oz)   03/19/18 59.3 kg (130 lb 12.8 oz)     GEN: frail. No distress  EYES:PERRLA  Mouth/ENT: Oropharynx is clear.   NECK: no cervical or supraclavicular lymphadenopathy  LUNGS: diminished breath sounds right lung base  CV: regular  ABDOMEN: soft, non-tender, non-distended, normal bowel sounds  EXT: warm, well perfused, 1+ edema  NEURO: alert  SKIN: .perineal , genital wound excoriation,  Sacral pressure ulcer- improved     LABORATORY AND IMAGING STUDIES     Lab Results   Component Value Date    WBC 7.4 04/20/2018     Lab Results   Component Value Date    RBC 2.88 04/20/2018     Lab Results   Component Value Date    HGB 8.6 04/20/2018     Lab Results   Component Value Date    HCT 26.5 04/20/2018      No components found for: MCT  Lab Results   Component Value Date    MCV 92 04/20/2018     Lab Results   Component Value Date    MCH 29.9 04/20/2018     Lab Results   Component Value Date    MCHC 32.5 04/20/2018     Lab Results   Component Value Date    RDW 15.8 04/20/2018     Lab Results   Component Value Date     04/20/2018     Recent Labs   Lab Test  04/20/18   1055  04/06/18   1100   NA  140  134   POTASSIUM  3.5  3.9   CHLORIDE  104  100   CO2  28  28   ANIONGAP  7  6   GLC  107*  77   BUN  18  12   CR  0.37*  0.46*   ELISE  7.7*  8.0*          ASSESSMENT AND PLAN     77-year-old female with no significant past medical history who presented with complaints of bright red blood per rectum and was recently found to have an anal mass which on biopsy came back for positive for invasive squamous cell carcinoma. Staging workup showed a PET avid anal mass along with hypermetabolic left inguinal adenopathy.     - Squamous cell carcinoma Anal canal  T2N1- IIIA  Started on definitive chemoradiation with infusional FU 1000 mg/m2 on days 1 to 4 and 29 to 32, plus mitomycin 10 mg/m2 on days 1 and 29 (as tolerated), maximum 20 mg per dose- day 1 on 02/12/18  Day 29 03/13/18 Skipped mitomycin and proceeded with 5FU at reduced dose given neutropenia/old age.  03/19/18 -04/04/18 Developed intractable diarrhea, fatigue and dermatitis in the later half of treatment and had to be admitted. Was eventually discharged and is currently at a rehab facility.   Continues to improve  Assessment of clinical response would be performed at 8-12 weeks after completion of treatment with clinical exam and BRITTNEY    - Perianal wound secondary to recent radiation  Improving. Recommend continuation of care to bilateral buttocks, perineum, and perianal area.    - Diarrhea  Secondary to recent chemoradiation. Improved per patient.    - Malnutrition  Oral intake has improved. Currently on TPN nutrition with plans to discontinue once nutritional  status improves.  - Small bowel obstruction - resolved  - Hydropneumothorax/hypoxemia / fractured ribs    Pain well controlled with Tylenol prn and scheduled tramadol at the nursing facility.   -Generalized weakness  Patient continues to work with PT/OT at the nursing facility. Walked for 3 hours yesterday  Scheduled with radiation oncology next week.  Return to clinic in 2 weeks with labs with JOVANI. RTC in 4 weeks with me. She was advised to return sooner prn.   Chart documentation with Dragon Voice recognition Software. Although reviewed after completion, some words and grammatical errors may remain.  Shen Ray MD  Attending Physician   Hematology/Medical Oncology

## 2018-04-20 NOTE — MR AVS SNAPSHOT
After Visit Summary   4/20/2018    Elizabeth Taylor    MRN: [id number]           Patient Information     Date Of Birth          1940        Visit Information        Provider Department      4/20/2018 10:30 AM Shen Ray MD [facility]        Today's Diagnoses     Malignant neoplasm of anal canal (H)           Follow-ups after your visit        Your next 10 appointments already scheduled     Apr 27, 2018 11:30 AM CDT   Return Visit with Zaire Madison MD   Radiation Oncology Clinic ([facility])    [facility]  1st Floor  [address] [address]   [phone]            May 02, 2018 11:45 AM CDT   [facility] Draw with  [employer] LAB DRAW   [facility] ([facility])    [address] [place] [address]  [address]  [facility] [address] [phone]            May 02, 2018 12:10 PM CDT   (Arrive by 11:55 AM)   Return Visit with HAIDER Avina   [facility] ([facility])    [address] [place] [address]  [address]  [facility] [address] [phone]            May 18, 2018 12:00 PM CDT   [facility] Lab Draw with  [employer] [facility] DRAW   [facility]    [address] [place] [address]  [address]  [facility] [address] [phone]            May 18, 2018 12:30 PM CDT   (Arrive by 12:15 PM)   Return Visit with Shen Ray MD   [facility] ([facility])    [address] [place] [address]  [address]  [facility] [id number] [phone]              Who to contact     If you have questions or need follow up information about today's clinic visit or your schedule please contact [facility] directly at [phone].  Normal or non-critical lab and imaging results will be communicated to you by MyChart, letter or phone within 4 business days  "after the clinic has received the results. If you do not hear from us within 7 days, please contact the clinic through [employer] or phone. If you have a critical or abnormal lab result, we will notify you by phone as soon as possible.  Submit refill requests through [employer] or call your pharmacy and they will forward the refill request to us. Please allow 3 business days for your refill to be completed.          Additional Information About Your Visit        [employer] Information     [employer] gives you secure access to your electronic health record. If you see a primary care provider, you can also send messages to your care team and make appointments. If you have questions, please call your primary care clinic.  If you do not have a primary care provider, please call [phone] and they will assist you.        Care EveryWhere ID     This is your Care EveryWhere ID. This could be used by other organizations to access your [facility] medical records  [id number]        Your Vitals Were     Pulse Temperature Respirations Height Pulse Oximetry BMI (Body Mass Index)    98 98.2  F (36.8  C) (Oral) 16 1.651 m (5' 5\") 91% 19.8 kg/m2       Blood Pressure from Last 3 Encounters:   04/20/18 109/68   04/17/18 97/57   04/04/18 123/75    Weight from Last 3 Encounters:   04/20/18 54 kg (119 lb)   04/03/18 59.6 kg (131 lb 8 oz)   03/19/18 59.3 kg (130 lb 12.8 oz)              We Performed the Following     CBC with platelets differential     Comprehensive metabolic panel        Primary Care Provider Office Phone # Fax #    Felisha Davis [phone] [address] [facility] [address]        Equal Access to Services     [facility]: Hadii aad ku hadasho Soomaali, waaxda luqadaha, qaybta kaalmada adeegyada, cyndy walton . So [facility] [phone].    ATENCIÓN: Si habla español, tiene a ghosh disposición servicios gratuitos de asistencia lingüística. Llame al [phone].    We comply " with applicable federal civil rights laws and [place] laws. We do not discriminate on the basis of race, color, national origin, age, disability, sex, sexual orientation, or gender identity.            Thank you!     Thank you for choosing [facility]  for your care. Our goal is always to provide you with excellent care. Hearing back from our patients is one way we can continue to improve our services. Please take a few minutes to complete the written survey that you may receive in the mail after your visit with us. Thank you!             Your Updated Medication List - Protect others around you: Learn how to safely use, store and throw away your medicines at www.disposemymeds.org.          This list is accurate as of 4/20/18 11:47 AM.  Always use your most recent med list.                   Brand Name Dispense Instructions for use Diagnosis    acetaminophen 325 MG tablet    TYLENOL     Take 2 tablets (650 mg) by mouth every 4 hours as needed for pain    Closed fracture of one rib of right side, initial encounter       artificial saliva Liqd liquid      Swish and spit 5-10 mLs in mouth 4 times daily as needed for dry mouth    Mouth dryness       calcium-vitamin D 600-400 MG-UNIT per tablet    CALTRATE    60 tablet    Take 1 tablet by mouth daily    Age-related osteoporosis with current pathological fracture, initial encounter       diphenoxylate-atropine 2.5-0.025 MG per tablet    LOMOTIL    40 tablet    Take 1 tablet by mouth 4 times daily as needed for diarrhea . If having less than two bowel movements daily, DO NOT GIVE    Diarrhea, unspecified type, Malignant neoplasm of anal canal (H)       hydrocortisone 2.5 % cream    ANUSOL-HC    30 g    Place rectally 2 times daily    External hemorrhoids       loperamide 2 MG tablet    LOPERAMIDE A-D    60 tablet    Take 1 tablet (2 mg) by mouth 4 times daily as needed for diarrhea . If having less than two bowel movements daily, DO NOT GIVE     Malignant neoplasm of anal canal (H)       LORazepam 0.5 MG tablet    ATIVAN    30 tablet    Take 1 tablet (0.5 mg) by mouth every 4 hours as needed (Anxiety, Nausea/Vomiting or Sleep)    Malignant neoplasm of anal canal (H)       magic mouthwash suspension    ENTER INGREDIENTS IN COMMENTS    500 mL    Swish and spit 5-10 mL four times daily as needed    Malignant neoplasm of anal canal (H)       methocarbamol 500 MG tablet    ROBAXIN    120 tablet    Take 1 tablet (500 mg) by mouth 4 times daily as needed for muscle spasms    Closed fracture of one rib of right side, initial encounter       multivitamin, therapeutic Tabs tablet      Take 1 tablet by mouth daily    Age-related osteoporosis with current pathological fracture, initial encounter       ondansetron 8 MG ODT tab    ZOFRAN-ODT    60 tablet    Take 1 tablet (8 mg) by mouth every 8 hours as needed For nausea    Malignant neoplasm of anal canal (H)       simethicone 40 MG/0.6ML suspension    MYLICON     Take 0.6 mLs (40 mg) by mouth every 6 hours as needed for cramping    Malignant neoplasm of anal canal (H), Flatulence, eructation and gas pain       traMADol 50 MG tablet    ULTRAM    20 tablet    Take 1 tablet (50 mg) by mouth every 6 hours    Closed fracture of one rib of right side, initial encounter       vitamin B complex with vitamin C Tabs tablet      Take 1 tablet by mouth daily

## 2018-04-27 ENCOUNTER — OFFICE VISIT (OUTPATIENT)
Dept: RADIATION ONCOLOGY | Facility: CLINIC | Age: 78
End: 2018-04-27
Attending: RADIOLOGY
Payer: MEDICARE

## 2018-04-27 VITALS — DIASTOLIC BLOOD PRESSURE: 64 MMHG | SYSTOLIC BLOOD PRESSURE: 98 MMHG

## 2018-04-27 DIAGNOSIS — C21.1 MALIGNANT NEOPLASM OF ANAL CANAL (H): Primary | ICD-10-CM

## 2018-04-27 PROCEDURE — G0463 HOSPITAL OUTPT CLINIC VISIT: HCPCS | Performed by: RADIOLOGY

## 2018-04-27 NOTE — PROGRESS NOTES
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 Radiation completed 3/23/2018.      HPI:   Ms. 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 Staging evaluation revealed a 2.5 cm mass located approximately 1 cm from the anal verge with involvement of the internal anal sphincter but not external sphincter or levator ani in addition to two FDG-avid left inguinal lymph nodes.      She underwent treatment with definitive chemoradiotherapy with curative intent. The primary tumor was treated to a total dose of 54 Gy while the node-positive left groin received 50.4 Gy and the right groin and pelvis was treated to 45 Gy in 30 daily fractions using a simultaneous integrated boost technique. Radiation was delivered with concurrent Mitomycin-C and infusional 5-FU with Mitomycin held with her second cycle of therapy due to a concern for treatment-related toxicities given her age and borderline functional status.        Ms. Taylor tolerated the initiation of chemoradiotherapy reasonably well with relatively mild acute treatment-related toxicities. Approximately midway through her radiotherapy treatment course, she developed worsening diarrhea which was initially managed with loperamide and escalated to tincture of opium with IV fluid rehydration when she continued to have >6-8 bowel movements per day. Despite escalation in her medical management, she continued to have intractable diarrhea and the decision  was made to admit her at the beginning of her final week of radiotherapy on 3/19/2018 for further evaluation and cares due to her uncontrolled symptoms, declining functional status and concern for her ability to care for herself after she suffered a fall while at home. She completed the remainder of her radiotherapy treatment as an inpatient without incident. Overall, she did not have any unplanned breaks in radiotherapy. She presents today for routine follow up, 1 month after treatment completion.       INTERVAL HISTORY:   Ms. Taylor remains at the TCU. She continues to have some abdominal cramping, but is overall doing better. She continues to have some fatigue and weakness, but these have been improving. Her pain is much better, as they recently changed her topical regimen to include lidocaine and spray aquphor. She believes they are planning to discharge her home in approximately 4 weeks. She otherwise has no additional pressing concerns or complaints. She was seen by Dr. Ray for follow up on 4/20/18, with instructions to follow up in approximately 1 month.     PHYSICAL EXAM:   Vitals: BP 98/64    Gen: Alert, in NAD, very thin and frail appearing. Laying comfortably in a hospital gurney.  Pulm: No wheezing, stridor or respiratory distress  CV: Well-perfused, no cyanosis, no pedal edema  Skin: Radiation-induced changes over the bilateral groin with resolving desquamation (left > right). Sacral pressure ulcer noted. There continues to be a significant amount of erythema and continued desquamation over the gluteal region which is centered around the perianal canal. The previous tumor within the anal canal is no longer visualized however BRITTNEY was deferred today secondary to her extreme sensitivity related to her radiation-induced dermatitis.    LABS AND IMAGING:  Reviewed.    IMPRESSION:   Ms. Taylor is a 77 year old female with a stage IIIA squamous cell carcinoma of the anal canal with significant treatment  related toxicities requiring hospital admission towards the end of treatment. She continues to slowly recover [facility]. She has no visible evidence of residual disease however BRITTNEY was not performed secondary to her continued severe pain within the perianal region from her prior radiotherapy.    PLAN:   1. Follow up with medical oncology (Dr. Ray) as scheduled  2. Referral back to colorectal surgery (Dr. Rosas) for post-treatment evaluation   3. Return to radiation oncology clinic in approximately 1 month  4. Recommend beginning vaginal dilator use when she has more fully recovered from her acute radiation-induced side effects    Juan Arzate MD  Resident, Radiation Oncology      Attending addendum:   I saw and examined the patient with the resident and agree with the documented plan of care.    Zaire Madison MD/PhD  Dept of Radiation Oncology  [facility]

## 2018-04-27 NOTE — Clinical Note
2018       RE: Elizabeth Taylor  [address] [address]     Dear Colleague,    Thank you for referring your patient, Elizabeth Taylor, to the RADIATION ONCOLOGY CLINIC. Please see a copy of my visit note below.    Radiation Oncology Follow-up Visit  2018    Elizabeth Taylor  MRN: [id number]   : 1940     DISEASE TREATED:   Stage IIIA [id number] Squamous cell carcinoma of the anal canal.    RADIATION THERAPY DELIVERED:   5,400 cGy to anal canal, 5,040 to left groin and 4,500 cGy to pelvis and right groin given with concurrent Mitomycin-C and 5-FU.    INTERVAL SINCE COMPLETION OF RADIATION THERAPY:   1 month. Radiation completed 3/23/2018.      HPI:   Ms. 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 Staging evaluation revealed a 2.5 cm mass located approximately 1 cm from the anal verge with involvement of the internal anal sphincter but not external sphincter or levator ani in addition to two FDG-avid left inguinal lymph nodes.      She underwent treatment with definitive chemoradiotherapy with curative intent. The primary tumor was treated to a total dose of 54 Gy while the node-positive left groin received 50.4 Gy with the right groin and pelvis treated to 45 Gy in 30 daily fractions using a simultaneous integrated boost technique. Radiation was delivered with concurrent Mitomycin-C and infusional 5-FU with Mitomycin held with her second cycle of therapy due to a concern for treatment-related toxicities given her age and borderline functional status.        Ms. Taylor tolerated the initiation of chemoradiotherapy reasonably well with relatively mild acute treatment-related toxicities. Approximately midway through her radiotherapy treatment course, she developed worsening diarrhea which was initially  managed with loperamide and escalated to tincture of opium with IV fluid rehydration when she continued to have >6-8 bowel movements per day. Despite escalation in her medical management, she continued to have intractable diarrhea and the decision was made to admit her at the beginning of her final week of radiotherapy on 3/19/2018 for further evaluation and cares due to her uncontrolled symptoms, declining functional status and concern for her ability to care for herself after she suffered a fall while at home. She completed the remainder of her radiotherapy treatment as an inpatient without incident. Overall, she did not have any unplanned breaks in radiotherapy. She presents today for routine follow up, 1 month after treatment completion.             INTERVAL HISTORY:   Ms. Taylor remains at the TCU. She continues to have abner  She was last seen for follow up in our clinic approximately 2 weeks ago. She was still at TCU with slow improvement in her symptoms. She still had a farias catheter in place and had a pressure ulcer on her coccyx. She was still very fatigued and weak, but was participating in PT and OT. She was also still receiving TPN. She was seen by Dr. Ray with medical oncology on 4/20.     ROS:  Complete review of systems is negative except for symptoms discussed in subjective above.    Current Outpatient Prescriptions   Medication     traMADol (ULTRAM) 50 MG tablet     acetaminophen (TYLENOL) 325 MG tablet     LORazepam (ATIVAN) 0.5 MG tablet     diphenoxylate-atropine (LOMOTIL) 2.5-0.025 MG per tablet     loperamide (LOPERAMIDE A-D) 2 MG tablet     ondansetron (ZOFRAN-ODT) 8 MG ODT tab     calcium-vitamin D (CALTRATE) 600-400 MG-UNIT per tablet     simethicone (MYLICON) 40 MG/0.6ML suspension     artificial saliva (BIOTENE DRY MOUTHWASH) LIQD liquid     multivitamin, therapeutic (THERA-VIT) TABS tablet     methocarbamol (ROBAXIN) 500 MG tablet     magic mouthwash (ENTER INGREDIENTS IN COMMENTS)  suspension     vitamin B complex with vitamin C (VITAMIN  B COMPLEX) TABS tablet     hydrocortisone (ANUSOL-HC) 2.5 % cream     No current facility-administered medications for this visit.           Allergies   Allergen Reactions     Darvon [Propoxyphene]      Had reaction in teen years     Penicillins      As a child     Ketoconazole Rash       Past Medical History:   Diagnosis Date     Anal cancer (H)      Endometriosis, site unspecified      Thyroiditis, unspecified     Thryoiditis-resolved         PHYSICAL EXAM: ***  There were no vitals taken for this visit.***  Gen: Alert, in NAD, very thin and frail appearing.  Comfortable on litter.  Pulm: No wheezing, stridor or respiratory distress  CV: Well-perfused, no cyanosis, no pedal edema  Skin: Groin shows some areas that are completely healed, but grade 2 desquamation in small area of groin on right, large area on left and labia/perineum mostly involved with sign of healing but remain grade 2 moist desquamation.  There is also moderate edema of labia L>R.  There is a pressure ulcer on coccyx that is about 3cm.  No signs of infection currently.  Psychiatric: Appropriate mood and affect.  Was a good historian, but couldn't remember the name of the care facility.      LABS AND IMAGING:  Reviewed.    IMPRESSION:   Ms. Taylor is a 77 year old female with a stage IIIA SCC of anal canal with significant treatment related toxicities but is very slowly recovering in TCU.    PLAN:   Patient has seen significant improvement in diarrhea.  Orders are to give anti-diarrhea medication after 1 loose stool.  Skin is showing sign of healing but is far from completely healed.  Continue current skin care (2-4 times per day).  They are using elfego bottle for cleaning and proshield/barrier cream.   Encouraged to continue therapy and work on her own while in bed to improve strength.  Continue pushing calories and fluids. Pain is improving.   Functional status remains very poor but seems  to be slowly improving.  Patient was never given a vaginal dilator.  Her skin is not in condition for a dilator nor is her functional capacity good enough for her to be using a dilator at this point.  Dr. Madison will address as next visit.  I did discuss this with the patient.    She will see Dr. Ray on  and Dr. Madison on .    Joanie Gomez NP  [facility]  [facility]    CC:  Patient Care Team:  Felisha Davis NP as PCP - General (Family Practice)  Shen Ray MD as MD (Hematology & Oncology)  Pauline Aguayo, RN as Nurse Coordinator (Hematology & Oncology)  Mercedes Duque, RN as Clinic Care Coordinator (Primary Care - CC)  Zaire Madison MD as MD (Radiation Oncology)      FOLLOW-UP VISIT    Patient Name: Elizabeth Taylor      : 1940     Age: 77 year old        ______________________________________________________________________________     Chief Complaint   Patient presents with     Cancer     Follow up for Anal 5400 cGy completed on 3/23/18     BP 98/64       Pain  Denies    Labs  Other Labs: No    Imaging  None        Other Appointments:     MD Name: Dr Ray Appointment Date:    MD Name: Appointment Date:   MD Name: Appointment Date:   Other Appointment Notes:     Residual Radiation side effect: Pt is still at TCU, skin still healing    Additional Instructions:     Nurse face-to-face time: Level 3:  10 min face to face time          Again, thank you for allowing me to participate in the care of your patient.      Sincerely,    Zaire Madison MD

## 2018-04-27 NOTE — LETTER
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 Radiation completed 3/23/2018.      HPI:   Ms. 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 Staging evaluation revealed a 2.5 cm mass located approximately 1 cm from the anal verge with involvement of the internal anal sphincter but not external sphincter or levator ani in addition to two FDG-avid left inguinal lymph nodes.      She underwent treatment with definitive chemoradiotherapy with curative intent. The primary tumor was treated to a total dose of 54 Gy while the node-positive left groin received 50.4 Gy and the right groin and pelvis was treated to 45 Gy in 30 daily fractions using a simultaneous integrated boost technique. Radiation was delivered with concurrent Mitomycin-C and infusional 5-FU with Mitomycin held with her second cycle of therapy due to a concern for treatment-related toxicities given her age and borderline functional status.        Ms. Taylor tolerated the initiation of chemoradiotherapy reasonably well with relatively mild acute treatment-related toxicities. Approximately midway through her radiotherapy treatment course, she developed worsening diarrhea which was initially managed with loperamide and escalated to tincture of opium with IV fluid rehydration when she continued to have >6-8 bowel movements per day. Despite  escalation in her medical management, she continued to have intractable diarrhea and the decision was made to admit her at the beginning of her final week of radiotherapy on 3/19/2018 for further evaluation and cares due to her uncontrolled symptoms, declining functional status and concern for her ability to care for herself after she suffered a fall while at home. She completed the remainder of her radiotherapy treatment as an inpatient without incident. Overall, she did not have any unplanned breaks in radiotherapy. She presents today for routine follow up, 1 month after treatment completion.       INTERVAL HISTORY:   Ms. Taylor remains at the TCU. She continues to have some abdominal cramping, but is overall doing better. She continues to have some fatigue and weakness, but these have been improving. Her pain is much better, as they recently changed her topical regimen to include lidocaine and spray aquphor. She believes they are planning to discharge her home in approximately 4 weeks. She otherwise has no additional pressing concerns or complaints. She was seen by Dr. Ray for follow up on 4/20/18, with instructions to follow up in approximately 1 month.     PHYSICAL EXAM:   Vitals: BP 98/64    Gen: Alert, in NAD, very thin and frail appearing. Laying comfortably in a hospital gurney.  Pulm: No wheezing, stridor or respiratory distress  CV: Well-perfused, no cyanosis, no pedal edema  Skin: Radiation-induced changes over the bilateral groin with resolving desquamation (left > right). Sacral pressure ulcer noted. There continues to be a significant amount of erythema and continued desquamation over the gluteal region which is centered around the perianal canal. The previous tumor within the anal canal is no longer visualized however BRITTNEY was deferred today secondary to her extreme sensitivity related to her radiation-induced dermatitis.    LABS AND IMAGING:  Reviewed.    IMPRESSION:   Ms. Taylor is a 77 year old  female with a stage IIIA squamous cell carcinoma of the anal canal with significant treatment related toxicities requiring hospital admission towards the end of treatment. She continues to slowly recover while residing at the [facility]. She has no visible evidence of residual disease however BRITTNEY was not performed secondary to her continued severe pain within the perianal region from her prior radiotherapy.    PLAN:   1. Follow up with medical oncology (Dr. Ray) as scheduled  2. Referral back to colorectal surgery (Dr. Rosas) for post-treatment evaluation   3. Return to radiation oncology clinic in approximately 1 month  4. Recommend beginning vaginal dilator use when she has more fully recovered from her acute radiation-induced side effects    Juan Arzate MD  Resident, Radiation Oncology      Attending addendum:   I saw and examined the patient with the resident and agree with the documented plan of care.    Zaire Madison MD/PhD  Dept of Radiation Oncology  [facility]      FOLLOW-UP VISIT    Patient Name: Elizabeth Taylor      : 1940     Age: 77 year old        ______________________________________________________________________________     Chief Complaint   Patient presents with     Cancer     Follow up for Anal 5400 cGy completed on 3/23/18     BP 98/64       Pain  Denies    Labs  Other Labs: No    Imaging  None        Other Appointments:     MD Name: Dr Ray Appointment Date:    MD Name: Appointment Date:   MD Name: Appointment Date:   Other Appointment Notes:     Residual Radiation side effect: Pt is still at TCU, skin still healing    Additional Instructions:     Nurse face-to-face time: Level 3:  10 min face to face time          Zaire Madison MD

## 2018-04-27 NOTE — LETTER
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 Radiation completed 3/23/2018.      HPI:   Ms. 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 Staging evaluation revealed a 2.5 cm mass located approximately 1 cm from the anal verge with involvement of the internal anal sphincter but not external sphincter or levator ani in addition to two FDG-avid left inguinal lymph nodes.      She underwent treatment with definitive chemoradiotherapy with curative intent. The primary tumor was treated to a total dose of 54 Gy while the node-positive left groin received 50.4 Gy and the right groin and pelvis was treated to 45 Gy in 30 daily fractions using a simultaneous integrated boost technique. Radiation was delivered with concurrent Mitomycin-C and infusional 5-FU with Mitomycin held with her second cycle of therapy due to a concern for treatment-related toxicities given her age and borderline functional status.        Ms. Taylor tolerated the initiation of chemoradiotherapy reasonably well with relatively mild acute treatment-related toxicities. Approximately midway through her radiotherapy treatment course, she developed worsening diarrhea which was initially managed with loperamide and escalated to tincture of opium with IV fluid rehydration when she continued to have >6-8 bowel movements per day. Despite  escalation in her medical management, she continued to have intractable diarrhea and the decision was made to admit her at the beginning of her final week of radiotherapy on 3/19/2018 for further evaluation and cares due to her uncontrolled symptoms, declining functional status and concern for her ability to care for herself after she suffered a fall while at home. She completed the remainder of her radiotherapy treatment as an inpatient without incident. Overall, she did not have any unplanned breaks in radiotherapy. She presents today for routine follow up, 1 month after treatment completion.       INTERVAL HISTORY:   Ms. Taylor remains at the TCU. She continues to have some abdominal cramping, but is overall doing better. She continues to have some fatigue and weakness, but these have been improving. Her pain is much better, as they recently changed her topical regimen to include lidocaine and spray aquphor. She believes they are planning to discharge her home in approximately 4 weeks. She otherwise has no additional pressing concerns or complaints. She was seen by Dr. Ray for follow up on 4/20/18, with instructions to follow up in approximately 1 month.     PHYSICAL EXAM:   Vitals: BP 98/64    Gen: Alert, in NAD, very thin and frail appearing. Laying comfortably in a hospital gurney.  Pulm: No wheezing, stridor or respiratory distress  CV: Well-perfused, no cyanosis, no pedal edema  Skin: Radiation-induced changes over the bilateral groin with resolving desquamation (left > right). Sacral pressure ulcer noted. There continues to be a significant amount of erythema and continued desquamation over the gluteal region which is centered around the perianal canal. The previous tumor within the anal canal is no longer visualized however BRITTNEY was deferred today secondary to her extreme sensitivity related to her radiation-induced dermatitis.    LABS AND IMAGING:  Reviewed.    IMPRESSION:   Ms. Taylor is a 77 year old  female with a stage IIIA squamous cell carcinoma of the anal canal with significant treatment related toxicities requiring hospital admission towards the end of treatment. She continues to slowly recover while residing at the [facility]. She has no visible evidence of residual disease however BRITTNEY was not performed secondary to her continued severe pain within the perianal region from her prior radiotherapy.    PLAN:   1. Follow up with medical oncology (Dr. Ray) as scheduled  2. Referral back to colorectal surgery (Dr. Rosas) for post-treatment evaluation   3. Return to radiation oncology clinic in approximately 1 month  4. Recommend beginning vaginal dilator use when she has more fully recovered from her acute radiation-induced side effects    Juan Arzate MD  Resident, Radiation Oncology      Attending addendum:   I saw and examined the patient with the resident and agree with the documented plan of care.    Zaire Madison MD/PhD  Dept of Radiation Oncology  [facility]      FOLLOW-UP VISIT    Patient Name: Elizabeth Taylor      : 1940     Age: 77 year old        ______________________________________________________________________________     Chief Complaint   Patient presents with     Cancer     Follow up for Anal 5400 cGy completed on 3/23/18     BP 98/64       Pain  Denies    Labs  Other Labs: No    Imaging  None        Other Appointments:     MD Name: Dr Ray Appointment Date:    MD Name: Appointment Date:   MD Name: Appointment Date:   Other Appointment Notes:     Residual Radiation side effect: Pt is still at TCU, skin still healing    Additional Instructions:     Nurse face-to-face time: Level 3:  10 min face to face time          Zaire Madison MD

## 2018-04-27 NOTE — MR AVS SNAPSHOT
After Visit Summary   4/27/2018    Elizabeth Taylor    MRN: [id number]           Patient Information     Date Of Birth          1940        Visit Information        Provider Department      4/27/2018 11:30 AM Zaire Madison MD Radiation Oncology Clinic        Today's Diagnoses     Malignant neoplasm of anal canal (H)    -  1       Follow-ups after your visit        Your next 10 appointments already scheduled     May 02, 2018 11:45 AM CDT   [facility] Draw with  [facility]   [facility] ([facility])    [address] [place] [address]  [address] [place] [id number]   [phone]            May 02, 2018 12:10 PM CDT   (Arrive by 11:55 AM)   Return Visit with HAIDER Avina   [facility] ([facility])    [address] [place] [address]  [address] [place] [id number]   [phone]            May 18, 2018 12:00 PM CDT   [facility] Lab Draw with  [facility]   [facility] Draw ([facility])    [address] [place] [address]  [address] [place] [id number]   [phone]            May 18, 2018 12:30 PM CDT   (Arrive by 12:15 PM)   Return Visit with Shen Ray MD   [facility] ([facility])    [address] [place] [address]  [address] [place] [id number] [phone]              Who to contact     Please call your clinic at [phone] to:    Ask questions about your health    Make or cancel appointments    Discuss your medicines    Learn about your test results    Speak to your doctor            Additional Information About Your Visit        MyChart Information     [employer] gives you secure access to your electronic health record. If you see a primary care provider, you can also send messages to your care team and make appointments. If you have questions, please call your primary care clinic.  If you do not have a  primary care provider, please call [phone] and they will assist you.      Dials is an electronic gateway that provides easy, online access to your medical records. With Dials, you can request a clinic appointment, read your test results, renew a prescription or communicate with your care team.     To access your existing account, please contact your [facility] or call [phone] for assistance.        Care EveryWhere ID     This is your Care EveryWhere ID. This could be used by other organizations to access your [place] medical records  [id number]         Blood Pressure from Last 3 Encounters:   04/27/18 98/64   04/20/18 109/68   04/17/18 97/57    Weight from Last 3 Encounters:   04/20/18 54 kg (119 lb)   04/03/18 59.6 kg (131 lb 8 oz)   03/19/18 59.3 kg (130 lb 12.8 oz)              Today, you had the following     No orders found for display       Primary Care Provider Office Phone # Fax #    Felisha Davis [phone]       [address] [facility] [address]        Equal Access to Services     [facility]: Hadii aad ku hadasho Soomaali, waaxda luqadaha, qaybta kaalmada adeegyada, cyndy muñoz haynatalian tio walton . So [facility] [phone].    ATENCIÓN: Si habla español, tiene a ghosh disposición servicios gratuitos de asistencia lingüística. Llame al [phone].    We comply with applicable federal civil rights laws and [place] laws. We do not discriminate on the basis of race, color, national origin, age, disability, sex, sexual orientation, or gender identity.            Thank you!     Thank you for choosing RADIATION ONCOLOGY CLINIC  for your care. Our goal is always to provide you with excellent care. Hearing back from our patients is one way we can continue to improve our services. Please take a few minutes to complete the written survey that you may receive in the mail after your visit with us. Thank you!             Your Updated  Medication List - Protect others around you: Learn how to safely use, store and throw away your medicines at www.disposemymeds.org.          This list is accurate as of 4/27/18  4:34 PM.  Always use your most recent med list.                   Brand Name Dispense Instructions for use Diagnosis    acetaminophen 325 MG tablet    TYLENOL     Take 2 tablets (650 mg) by mouth every 4 hours as needed for pain    Closed fracture of one rib of right side, initial encounter       artificial saliva Liqd liquid      Swish and spit 5-10 mLs in mouth 4 times daily as needed for dry mouth    Mouth dryness       calcium-vitamin D 600-400 MG-UNIT per tablet    CALTRATE    60 tablet    Take 1 tablet by mouth daily    Age-related osteoporosis with current pathological fracture, initial encounter       diphenoxylate-atropine 2.5-0.025 MG per tablet    LOMOTIL    40 tablet    Take 1 tablet by mouth 4 times daily as needed for diarrhea . If having less than two bowel movements daily, DO NOT GIVE    Diarrhea, unspecified type, Malignant neoplasm of anal canal (H)       hydrocortisone 2.5 % cream    ANUSOL-HC    30 g    Place rectally 2 times daily    External hemorrhoids       loperamide 2 MG tablet    LOPERAMIDE A-D    60 tablet    Take 1 tablet (2 mg) by mouth 4 times daily as needed for diarrhea . If having less than two bowel movements daily, DO NOT GIVE    Malignant neoplasm of anal canal (H)       LORazepam 0.5 MG tablet    ATIVAN    30 tablet    Take 1 tablet (0.5 mg) by mouth every 4 hours as needed (Anxiety, Nausea/Vomiting or Sleep)    Malignant neoplasm of anal canal (H)       magic mouthwash suspension    ENTER INGREDIENTS IN COMMENTS    500 mL    Swish and spit 5-10 mL four times daily as needed    Malignant neoplasm of anal canal (H)       methocarbamol 500 MG tablet    ROBAXIN    120 tablet    Take 1 tablet (500 mg) by mouth 4 times daily as needed for muscle spasms    Closed fracture of one rib of right side, initial  encounter       multivitamin, therapeutic Tabs tablet      Take 1 tablet by mouth daily    Age-related osteoporosis with current pathological fracture, initial encounter       ondansetron 8 MG ODT tab    ZOFRAN-ODT    60 tablet    Take 1 tablet (8 mg) by mouth every 8 hours as needed For nausea    Malignant neoplasm of anal canal (H)       simethicone 40 MG/0.6ML suspension    MYLICON     Take 0.6 mLs (40 mg) by mouth every 6 hours as needed for cramping    Malignant neoplasm of anal canal (H), Flatulence, eructation and gas pain       traMADol 50 MG tablet    ULTRAM    20 tablet    Take 1 tablet (50 mg) by mouth every 6 hours    Closed fracture of one rib of right side, initial encounter       vitamin B complex with vitamin C Tabs tablet      Take 1 tablet by mouth daily

## 2018-04-30 ENCOUNTER — TELEPHONE (OUTPATIENT)
Dept: SURGERY | Facility: CLINIC | Age: 78
End: 2018-04-30

## 2018-04-30 DIAGNOSIS — C21.1 MALIGNANT NEOPLASM OF ANAL CANAL (H): Primary | ICD-10-CM

## 2018-04-30 NOTE — TELEPHONE ENCOUNTER
Received message from radiation  that patient has completed radiation and will need a follow up with Dr. Rosas.  Patient is scheduled 5/18/18 at 10:00 am.  Called patient and left a message informing her of the appointment.  Asked patient to confirm.  Provided my direct number.

## 2018-05-02 ENCOUNTER — APPOINTMENT (OUTPATIENT)
Dept: LAB | Facility: CLINIC | Age: 78
End: 2018-05-02
Attending: INTERNAL MEDICINE
Payer: MEDICARE

## 2018-05-02 ENCOUNTER — ONCOLOGY VISIT (OUTPATIENT)
Dept: ONCOLOGY | Facility: CLINIC | Age: 78
End: 2018-05-02
Attending: PHYSICIAN ASSISTANT
Payer: MEDICARE

## 2018-05-02 VITALS
TEMPERATURE: 98 F | HEART RATE: 82 BPM | WEIGHT: 94.6 LBS | DIASTOLIC BLOOD PRESSURE: 62 MMHG | RESPIRATION RATE: 16 BRPM | BODY MASS INDEX: 15.76 KG/M2 | SYSTOLIC BLOOD PRESSURE: 113 MMHG | HEIGHT: 65 IN | OXYGEN SATURATION: 95 %

## 2018-05-02 DIAGNOSIS — R19.7 DIARRHEA, UNSPECIFIED TYPE: ICD-10-CM

## 2018-05-02 DIAGNOSIS — C21.1 MALIGNANT NEOPLASM OF ANAL CANAL (H): Primary | ICD-10-CM

## 2018-05-02 LAB
ALBUMIN SERPL-MCNC: 2 G/DL (ref 3.4–5)
ALP SERPL-CCNC: 120 U/L (ref 40–150)
ALT SERPL W P-5'-P-CCNC: 29 U/L (ref 0–50)
ANION GAP SERPL CALCULATED.3IONS-SCNC: 6 MMOL/L (ref 3–14)
AST SERPL W P-5'-P-CCNC: 25 U/L (ref 0–45)
BASOPHILS # BLD AUTO: 0 10E9/L (ref 0–0.2)
BASOPHILS NFR BLD AUTO: 0.5 %
BILIRUB SERPL-MCNC: 0.3 MG/DL (ref 0.2–1.3)
BUN SERPL-MCNC: 22 MG/DL (ref 7–30)
CALCIUM SERPL-MCNC: 8.2 MG/DL (ref 8.5–10.1)
CHLORIDE SERPL-SCNC: 102 MMOL/L (ref 94–109)
CO2 SERPL-SCNC: 28 MMOL/L (ref 20–32)
CREAT SERPL-MCNC: 0.38 MG/DL (ref 0.52–1.04)
DIFFERENTIAL METHOD BLD: ABNORMAL
EOSINOPHIL # BLD AUTO: 0.3 10E9/L (ref 0–0.7)
EOSINOPHIL NFR BLD AUTO: 3.4 %
ERYTHROCYTE [DISTWIDTH] IN BLOOD BY AUTOMATED COUNT: 16.6 % (ref 10–15)
GFR SERPL CREATININE-BSD FRML MDRD: >90 ML/MIN/1.7M2
GLUCOSE SERPL-MCNC: 107 MG/DL (ref 70–99)
HCT VFR BLD AUTO: 29.5 % (ref 35–47)
HGB BLD-MCNC: 9.3 G/DL (ref 11.7–15.7)
IMM GRANULOCYTES # BLD: 0.1 10E9/L (ref 0–0.4)
IMM GRANULOCYTES NFR BLD: 0.8 %
LYMPHOCYTES # BLD AUTO: 1.2 10E9/L (ref 0.8–5.3)
LYMPHOCYTES NFR BLD AUTO: 13.4 %
MAGNESIUM SERPL-MCNC: 2 MG/DL (ref 1.6–2.3)
MCH RBC QN AUTO: 29.9 PG (ref 26.5–33)
MCHC RBC AUTO-ENTMCNC: 31.5 G/DL (ref 31.5–36.5)
MCV RBC AUTO: 95 FL (ref 78–100)
MONOCYTES # BLD AUTO: 0.7 10E9/L (ref 0–1.3)
MONOCYTES NFR BLD AUTO: 8.4 %
NEUTROPHILS # BLD AUTO: 6.4 10E9/L (ref 1.6–8.3)
NEUTROPHILS NFR BLD AUTO: 73.5 %
NRBC # BLD AUTO: 0 10*3/UL
NRBC BLD AUTO-RTO: 0 /100
PHOSPHATE SERPL-MCNC: 3.4 MG/DL (ref 2.5–4.5)
PLATELET # BLD AUTO: 300 10E9/L (ref 150–450)
POTASSIUM SERPL-SCNC: 4.3 MMOL/L (ref 3.4–5.3)
PROT SERPL-MCNC: 5.8 G/DL (ref 6.8–8.8)
RBC # BLD AUTO: 3.11 10E12/L (ref 3.8–5.2)
SODIUM SERPL-SCNC: 137 MMOL/L (ref 133–144)
WBC # BLD AUTO: 8.7 10E9/L (ref 4–11)

## 2018-05-02 PROCEDURE — 83735 ASSAY OF MAGNESIUM: CPT | Performed by: PHYSICIAN ASSISTANT

## 2018-05-02 PROCEDURE — 25000128 H RX IP 250 OP 636: Mod: ZF | Performed by: PHYSICIAN ASSISTANT

## 2018-05-02 PROCEDURE — 36591 DRAW BLOOD OFF VENOUS DEVICE: CPT

## 2018-05-02 PROCEDURE — 85025 COMPLETE CBC W/AUTO DIFF WBC: CPT | Performed by: PHYSICIAN ASSISTANT

## 2018-05-02 PROCEDURE — G0463 HOSPITAL OUTPT CLINIC VISIT: HCPCS | Mod: ZF

## 2018-05-02 PROCEDURE — 80053 COMPREHEN METABOLIC PANEL: CPT | Performed by: PHYSICIAN ASSISTANT

## 2018-05-02 PROCEDURE — 84100 ASSAY OF PHOSPHORUS: CPT | Performed by: PHYSICIAN ASSISTANT

## 2018-05-02 PROCEDURE — 99214 OFFICE O/P EST MOD 30 MIN: CPT | Mod: ZP | Performed by: PHYSICIAN ASSISTANT

## 2018-05-02 RX ORDER — HEPARIN SODIUM (PORCINE) LOCK FLUSH IV SOLN 100 UNIT/ML 100 UNIT/ML
5 SOLUTION INTRAVENOUS
Status: COMPLETED | OUTPATIENT
Start: 2018-05-02 | End: 2018-05-02

## 2018-05-02 RX ADMIN — SODIUM CHLORIDE, PRESERVATIVE FREE 5 ML: 5 INJECTION INTRAVENOUS at 11:58

## 2018-05-02 ASSESSMENT — PAIN SCALES - GENERAL: PAINLEVEL: NO PAIN (0)

## 2018-05-02 NOTE — LETTER
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 Her oncologic history is as follows:    77-year-old female who developed bright red blood per rectum started about 4 years ago and progressively got worse. She underwent a colonoscopy in April 2017 which noted to have small anal fissure along with internal hemorrhoids. Symptoms continued and  she was referred to colorectal surgery for further evaluation. On exam she was found to have an 1.5 cm anal lesion on the left side along with left groin palpable adenopathy. Biopsy of anal mass came back positive for squamous cell carcinoma. Patient underwent staging workup with MRI pelvis and a PET scan- showed PET avid left anal canal mass along with small left inguinal FDG avid lymph nodes.      02/12/18- Started on concurrent chemoradiation with 5FU/Mitomycin     03/13/18 Skipped  Mitomycin and proceeded with 5FU at reduced dose given neutropenia/old age     03/19/18 -04/04/18 admitted to the [facility] with intractable diarrhea, nausea, generalized weakness and fall at home resulting in multiple right rib fractures. Hospital course was complicated by small bowel obstruction for which patient was started on TPN. Bowel obstruction was managed conservatively eventually improved prior to discharge with advancement of diet to full liquids along with continuing the TPN . She also developed severe perineal  excoriation from radiation requiring extensive wound care. Also developed  catheter associated UTI requiring antibiotic course as well as hydropneumothorax secondary to underlying rib fractures requiring supplemental o2. She was eventually discharged to transitional care unit. She has been seen every 2  weeks by Dr. Ray for follow-up and I am seeing her today for ongoing care. Of note, her last day of radiation was 3/23/18.    Interval History:  Ms. Taylor returns to clinic today with a friend. She came via stretcher due to transportation needs but states this has been the case for her prior visits as well. She states that overall she thinks she is continuing to improve, though she did have a minor set back this past weekend with irritation of her catheter that caused burning and subsequent decreased motility. A nurse finally was able to reposition the catheter on Monday and she has had no issues since. She has now been up walking daily and is proud of how much progress she is making. She also has been eating quite well (multiple meals per day) though admits she likely doesn't get a full 8 glasses of fluids in. She is still on TPN and her TCU just started tracking her calories. She states she still has to eat slow, smaller meals or else she gets abdominal cramping, but otherwise denies abdominal pain. No N/V. She continues to have intermittent diarrhea for which she is on Imodium PRN. She has had a few formed stools this past weekend, which she was happy about.    She states the sore on her bottom is continuing to heal and she has a wonderful wound care doctor helping with this. She is using lidocaine on her radiation burns. Her prior rib pain is much improved and she is using Tylenol scheduled and Tramadol once daily PRN with good relief. She is now off of O2 since last week and denies any breathing concerns. No cough, URI symptoms, fevers, or chills. She did have a mild headache and dizziness this morning, though admits she hadn't drank any water due to getting ready for her clinic visit. She does have mild peripheral edema bilaterally but has been wearing compression stockings with good relief. No bleeding issues or rashes. She is in good spirits today and hopeful that she will only continue to get stronger  and eventually be able to go home    Current Outpatient Prescriptions   Medication Sig Dispense Refill     acetaminophen (TYLENOL) 325 MG tablet Take 2 tablets (650 mg) by mouth every 4 hours as needed for pain       artificial saliva (BIOTENE DRY MOUTHWASH) LIQD liquid Swish and spit 5-10 mLs in mouth 4 times daily as needed for dry mouth       calcium-vitamin D (CALTRATE) 600-400 MG-UNIT per tablet Take 1 tablet by mouth daily 60 tablet      diphenoxylate-atropine (LOMOTIL) 2.5-0.025 MG per tablet Take 1 tablet by mouth 4 times daily as needed for diarrhea . If having less than two bowel movements daily, DO NOT GIVE 40 tablet 0     hydrocortisone (ANUSOL-HC) 2.5 % cream Place rectally 2 times daily 30 g 1     loperamide (LOPERAMIDE A-D) 2 MG tablet Take 1 tablet (2 mg) by mouth 4 times daily as needed for diarrhea . If having less than two bowel movements daily, DO NOT GIVE 60 tablet 3     LORazepam (ATIVAN) 0.5 MG tablet Take 1 tablet (0.5 mg) by mouth every 4 hours as needed (Anxiety, Nausea/Vomiting or Sleep) 30 tablet 0     magic mouthwash (ENTER INGREDIENTS IN COMMENTS) suspension Swish and spit 5-10 mL four times daily as needed 500 mL 1     methocarbamol (ROBAXIN) 500 MG tablet Take 1 tablet (500 mg) by mouth 4 times daily as needed for muscle spasms (Patient not taking: Reported on 4/27/2018) 120 tablet      multivitamin, therapeutic (THERA-VIT) TABS tablet Take 1 tablet by mouth daily (Patient not taking: Reported on 4/20/2018)  0     ondansetron (ZOFRAN-ODT) 8 MG ODT tab Take 1 tablet (8 mg) by mouth every 8 hours as needed For nausea 60 tablet      simethicone (MYLICON) 40 MG/0.6ML suspension Take 0.6 mLs (40 mg) by mouth every 6 hours as needed for cramping       traMADol (ULTRAM) 50 MG tablet Take 1 tablet (50 mg) by mouth every 6 hours 20 tablet 0     vitamin B complex with vitamin C (VITAMIN  B COMPLEX) TABS tablet Take 1 tablet by mouth daily         Physical Examination:  /62  Pulse 82   "Temp 98  F (36.7  C) (Oral)  Resp 16  Ht 1.651 m (5' 5\")  Wt 42.9 kg (94 lb 9.6 oz)  SpO2 95%  BMI 15.74 kg/m2  Wt Readings from Last 10 Encounters:   04/20/18 54 kg (119 lb)   04/03/18 59.6 kg (131 lb 8 oz)   03/19/18 59.3 kg (130 lb 12.8 oz)   03/17/18 55.2 kg (121 lb 11.1 oz)   03/17/18 55.2 kg (121 lb 9.6 oz)   03/15/18 50.8 kg (112 lb)   03/13/18 52.9 kg (116 lb 9.6 oz)   03/09/18 51.2 kg (112 lb 12.8 oz)   03/09/18 51.2 kg (112 lb 12.8 oz)   03/05/18 49.4 kg (109 lb)     Constitutional: Cachetic-appearing female in no acute distress, lying in bed.  Eyes: EOMI, PERRL. No scleral icterus.  ENT: Oral mucosa is moist without lesions or thrush.   Lymphatic: Neck is supple without cervical or supraclavicular lymphadenopathy.  Cardiovascular: Regular rate and rhythm. No murmurs, gallops, or rubs. Trace bilateral non-pitting peripheral edema.  Respiratory: Clear to auscultation bilaterally. No wheezes or crackles.  Gastrointestinal: Bowel sounds present. Abdomen soft, non-tender but limited exam as patient notes palpation stimulates her bowels. No palpable hepatosplenomegaly or masses.   Neurologic: Cranial nerves II through XII are grossly intact.  Skin: Dime size open lesion on right buttock with no signs of infection. Radiation skin changes over entire buttocks. No rashes, petechiae, or bruising noted on exposed skin.    Laboratory Data:  Results for GERHARD SU (MRN [id number]) as of 5/3/2018 07:36   5/2/2018 12:14   Sodium 137   Potassium 4.3   Chloride 102   Carbon Dioxide 28   Urea Nitrogen 22   Creatinine 0.38 (L)   GFR Estimate >90   GFR Estimate If Black >90   Calcium 8.2 (L)   Anion Gap 6   Magnesium 2.0   Phosphorus 3.4   Albumin 2.0 (L)   Protein Total 5.8 (L)   Bilirubin Total 0.3   Alkaline Phosphatase 120   ALT 29   AST 25   Glucose 107 (H)   WBC 8.7   Hemoglobin 9.3 (L)   Hematocrit 29.5 (L)   Platelet Count 300   RBC Count 3.11 (L)   MCV 95   MCH 29.9   MCHC 31.5   RDW 16.6 (H)   Diff " Method Automated Method   % Neutrophils 73.5   % Lymphocytes 13.4   % Monocytes 8.4   % Eosinophils 3.4   % Basophils 0.5   % Immature Granulocytes 0.8   Nucleated RBCs 0   Absolute Neutrophil 6.4   Absolute Lymphocytes 1.2   Absolute Monocytes 0.7   Absolute Eosinophils 0.3   Absolute Basophils 0.0   Abs Immature Granulocytes 0.1   Absolute Nucleated RBC 0.0       Assessment and Plan:  1. Onc  Stage IIIA SCC of the anal canal s/p chemoradiation with 5FU + mitomycin with eventual removal of mitomycin and 5FU dose reduction due to neutropenia and age. Completed radiation 3/23/18. Continues to slowly recover after her prolong hospitalization stay for intractable diarrhea. She has follow up with radiation oncology, Dr. Ray, and surgery the next coming weeks to determine response to treatment and next steps. Dr. Ray plan to see her 8-12 weeks after completion of treatment with clinical exam and BRITTNEY as well.    2. GI  Admitted with intractable diarrhea 2/2 chemoradiation. This continues to improve, though she is still very bound to the bathroom. Recommend continuing Imodium PRN and monitoring for ongoing improvement. Prior SBO resolved. Abdominal pain improving. No other GI concerns.    3. Derm  Perianal wound 2/2 radiation is improving. Appreciate TCU wound care team ongoing excellent care. Continue topical lidocaine for radiation pain.    4. FEN  Patient tells me she is continuing to eat better each day. Her weight is slowly increasing, though she is still under 100lbs. Chemistries today reveal improving albumin and otherwise all WNL. Will call TCU dietician to discuss plan for tapering off TPN. Encouraged her to drink 8 glasses of fluid a day given mild headache and dizziness this morning with decreased intake. Creat stable.     5. Deconditioning  Had profound weakness by the time she was admitted to the hospital. Her strength continues to improve, though she still required stretcher transportation. Continue OT/PT  at nursing facility and congratulated her on her great job walking.    6. Pulm  Prior rib fractures/hydropneumothorax seem to be improving as her pain is much less, well controlled on Tylenol and PRN Tramadol, and she is off O2. No respiratory complaints. CBC all WNL.     Tim Larose PA-C  [facility]  [address], [place] [address] [phone]

## 2018-05-02 NOTE — PROGRESS NOTES
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 Her oncologic history is as follows:    77-year-old female who developed bright red blood per rectum started about 4 years ago and progressively got worse. She underwent a colonoscopy in April 2017 which noted to have small anal fissure along with internal hemorrhoids. Symptoms continued and  she was referred to colorectal surgery for further evaluation. On exam she was found to have an 1.5 cm anal lesion on the left side along with left groin palpable adenopathy. Biopsy of anal mass came back positive for squamous cell carcinoma. Patient underwent staging workup with MRI pelvis and a PET scan- showed PET avid left anal canal mass along with small left inguinal FDG avid lymph nodes.      02/12/18- Started on concurrent chemoradiation with 5FU/Mitomycin     03/13/18 Skipped  Mitomycin and proceeded with 5FU at reduced dose given neutropenia/old age     03/19/18 -04/04/18 admitted to the [facility] with intractable diarrhea, nausea, generalized weakness and fall at home resulting in multiple right rib fractures. Hospital course was complicated by small bowel obstruction for which patient was started on TPN. Bowel obstruction was managed conservatively eventually improved prior to discharge with advancement of diet to full liquids along with continuing the TPN . She also developed severe perineal  excoriation from radiation requiring extensive wound care. Also developed  catheter associated UTI requiring antibiotic course as well as hydropneumothorax secondary to underlying rib fractures requiring supplemental o2. She was eventually discharged to transitional care unit. She has been seen every 2 weeks by Dr. Ray for follow-up and I am seeing her today for ongoing care. Of note, her  last day of radiation was 3/23/18.    Interval History:  Ms. Taylor returns to clinic today with a friend. She came via stretcher due to transportation needs but states this has been the case for her prior visits as well. She states that overall she thinks she is continuing to improve, though she did have a minor set back this past weekend with irritation of her catheter that caused burning and subsequent decreased motility. A nurse finally was able to reposition the catheter on Monday and she has had no issues since. She has now been up walking daily and is proud of how much progress she is making. She also has been eating quite well (multiple meals per day) though admits she likely doesn't get a full 8 glasses of fluids in. She is still on TPN and her TCU just started tracking her calories. She states she still has to eat slow, smaller meals or else she gets abdominal cramping, but otherwise denies abdominal pain. No N/V. She continues to have intermittent diarrhea for which she is on Imodium PRN. She has had a few formed stools this past weekend, which she was happy about.    She states the sore on her bottom is continuing to heal and she has a wonderful wound care doctor helping with this. She is using lidocaine on her radiation burns. Her prior rib pain is much improved and she is using Tylenol scheduled and Tramadol once daily PRN with good relief. She is now off of O2 since last week and denies any breathing concerns. No cough, URI symptoms, fevers, or chills. She did have a mild headache and dizziness this morning, though admits she hadn't drank any water due to getting ready for her clinic visit. She does have mild peripheral edema bilaterally but has been wearing compression stockings with good relief. No bleeding issues or rashes. She is in good spirits today and hopeful that she will only continue to get stronger and eventually be able to go home    Current Outpatient Prescriptions   Medication Sig  "Dispense Refill     acetaminophen (TYLENOL) 325 MG tablet Take 2 tablets (650 mg) by mouth every 4 hours as needed for pain       artificial saliva (BIOTENE DRY MOUTHWASH) LIQD liquid Swish and spit 5-10 mLs in mouth 4 times daily as needed for dry mouth       calcium-vitamin D (CALTRATE) 600-400 MG-UNIT per tablet Take 1 tablet by mouth daily 60 tablet      diphenoxylate-atropine (LOMOTIL) 2.5-0.025 MG per tablet Take 1 tablet by mouth 4 times daily as needed for diarrhea . If having less than two bowel movements daily, DO NOT GIVE 40 tablet 0     hydrocortisone (ANUSOL-HC) 2.5 % cream Place rectally 2 times daily 30 g 1     loperamide (LOPERAMIDE A-D) 2 MG tablet Take 1 tablet (2 mg) by mouth 4 times daily as needed for diarrhea . If having less than two bowel movements daily, DO NOT GIVE 60 tablet 3     LORazepam (ATIVAN) 0.5 MG tablet Take 1 tablet (0.5 mg) by mouth every 4 hours as needed (Anxiety, Nausea/Vomiting or Sleep) 30 tablet 0     magic mouthwash (ENTER INGREDIENTS IN COMMENTS) suspension Swish and spit 5-10 mL four times daily as needed 500 mL 1     methocarbamol (ROBAXIN) 500 MG tablet Take 1 tablet (500 mg) by mouth 4 times daily as needed for muscle spasms (Patient not taking: Reported on 4/27/2018) 120 tablet      multivitamin, therapeutic (THERA-VIT) TABS tablet Take 1 tablet by mouth daily (Patient not taking: Reported on 4/20/2018)  0     ondansetron (ZOFRAN-ODT) 8 MG ODT tab Take 1 tablet (8 mg) by mouth every 8 hours as needed For nausea 60 tablet      simethicone (MYLICON) 40 MG/0.6ML suspension Take 0.6 mLs (40 mg) by mouth every 6 hours as needed for cramping       traMADol (ULTRAM) 50 MG tablet Take 1 tablet (50 mg) by mouth every 6 hours 20 tablet 0     vitamin B complex with vitamin C (VITAMIN  B COMPLEX) TABS tablet Take 1 tablet by mouth daily         Physical Examination:  /62  Pulse 82  Temp 98  F (36.7  C) (Oral)  Resp 16  Ht 1.651 m (5' 5\")  Wt 42.9 kg (94 lb 9.6 oz)  " SpO2 95%  BMI 15.74 kg/m2  Wt Readings from Last 10 Encounters:   04/20/18 54 kg (119 lb)   04/03/18 59.6 kg (131 lb 8 oz)   03/19/18 59.3 kg (130 lb 12.8 oz)   03/17/18 55.2 kg (121 lb 11.1 oz)   03/17/18 55.2 kg (121 lb 9.6 oz)   03/15/18 50.8 kg (112 lb)   03/13/18 52.9 kg (116 lb 9.6 oz)   03/09/18 51.2 kg (112 lb 12.8 oz)   03/09/18 51.2 kg (112 lb 12.8 oz)   03/05/18 49.4 kg (109 lb)     Constitutional: Cachetic-appearing female in no acute distress, lying in bed.  Eyes: EOMI, PERRL. No scleral icterus.  ENT: Oral mucosa is moist without lesions or thrush.   Lymphatic: Neck is supple without cervical or supraclavicular lymphadenopathy.  Cardiovascular: Regular rate and rhythm. No murmurs, gallops, or rubs. Trace bilateral non-pitting peripheral edema.  Respiratory: Clear to auscultation bilaterally. No wheezes or crackles.  Gastrointestinal: Bowel sounds present. Abdomen soft, non-tender but limited exam as patient notes palpation stimulates her bowels. No palpable hepatosplenomegaly or masses.   Neurologic: Cranial nerves II through XII are grossly intact.  Skin: Dime size open lesion on right buttock with no signs of infection. Radiation skin changes over entire buttocks. No rashes, petechiae, or bruising noted on exposed skin.    Laboratory Data:  Results for GERHARD SU (MRN [id number]) as of 5/3/2018 07:36   5/2/2018 12:14   Sodium 137   Potassium 4.3   Chloride 102   Carbon Dioxide 28   Urea Nitrogen 22   Creatinine 0.38 (L)   GFR Estimate >90   GFR Estimate If Black >90   Calcium 8.2 (L)   Anion Gap 6   Magnesium 2.0   Phosphorus 3.4   Albumin 2.0 (L)   Protein Total 5.8 (L)   Bilirubin Total 0.3   Alkaline Phosphatase 120   ALT 29   AST 25   Glucose 107 (H)   WBC 8.7   Hemoglobin 9.3 (L)   Hematocrit 29.5 (L)   Platelet Count 300   RBC Count 3.11 (L)   MCV 95   MCH 29.9   MCHC 31.5   RDW 16.6 (H)   Diff Method Automated Method   % Neutrophils 73.5   % Lymphocytes 13.4   % Monocytes 8.4   %  Eosinophils 3.4   % Basophils 0.5   % Immature Granulocytes 0.8   Nucleated RBCs 0   Absolute Neutrophil 6.4   Absolute Lymphocytes 1.2   Absolute Monocytes 0.7   Absolute Eosinophils 0.3   Absolute Basophils 0.0   Abs Immature Granulocytes 0.1   Absolute Nucleated RBC 0.0       Assessment and Plan:  1. Onc  Stage IIIA SCC of the anal canal s/p chemoradiation with 5FU + mitomycin with eventual removal of mitomycin and 5FU dose reduction due to neutropenia and age. Completed radiation 3/23/18. Continues to slowly recover after her prolong hospitalization stay for intractable diarrhea. She has follow up with radiation oncology, Dr. Ray, and surgery the next coming weeks to determine response to treatment and next steps. Dr. Ray plan to see her 8-12 weeks after completion of treatment with clinical exam and BRITTNEY as well.    2. GI  Admitted with intractable diarrhea 2/2 chemoradiation. This continues to improve, though she is still very bound to the bathroom. Recommend continuing Imodium PRN and monitoring for ongoing improvement. Prior SBO resolved. Abdominal pain improving. No other GI concerns.    3. Derm  Perianal wound 2/2 radiation is improving. Appreciate TCU wound care team ongoing excellent care. Continue topical lidocaine for radiation pain.    4. FEN  Patient tells me she is continuing to eat better each day. Her weight is slowly increasing, though she is still under 100lbs. Chemistries today reveal improving albumin and otherwise all WNL. Will call TCU dietician to discuss plan for tapering off TPN. Encouraged her to drink 8 glasses of fluid a day given mild headache and dizziness this morning with decreased intake. Creat stable.     5. Deconditioning  Had profound weakness by the time she was admitted to the hospital. Her strength continues to improve, though she still required stretcher transportation. Continue OT/PT at nursing facility and congratulated her on her great job walking.    6. Pulm  Prior  rib fractures/hydropneumothorax seem to be improving as her pain is much less, well controlled on Tylenol and PRN Tramadol, and she is off O2. No respiratory complaints. CBC all WNL.     Tim Larose PA-C  [facility]  [address] [place] [address] [phone]

## 2018-05-02 NOTE — MR AVS SNAPSHOT
After Visit Summary   5/2/2018    Elizabeth Taylor    MRN: [id number]           Patient Information     Date Of Birth          1940        Visit Information        Provider Department      5/2/2018 12:10 PM Tim Larose PA [facility]        Today's Diagnoses     Malignant neoplasm of anal canal (H)    -  1    Diarrhea, unspecified type           Follow-ups after your visit        Your next 10 appointments already scheduled     May 18, 2018 10:00 AM CDT   (Arrive by 9:45 AM)   Return Visit with Emir Rosas MD   [facility]al Surgery ([facility] [place])    [address]  4th Floor  [facility] [address]   [phone]            May 18, 2018 12:00 PM CDT   [facility] Draw with  [facility] D[facility]   [facility] Lab Draw ([facility])    [address]  [address]  [facility] [address]   [phone]            May 18, 2018 12:30 PM CDT   (Arrive by 12:15 PM)   Return Visit with Shen Ray MD   [facility] ([facility])    [address]  [address]  [facility] [address]   [phone]            May 25, 2018 11:30 AM CDT   Return Visit with Zaire Madison MD   Radiation Oncology Clinic ([facility])    [facility]  1st Floor  [address] [address]   [phone]              Future tests that were ordered for you today     Open Future Orders        Priority Expected Expires Ordered    *CBC with platelets differential Routine 5/18/2018 5/3/2019 5/3/2018    Comprehensive metabolic panel Routine 5/18/2018 5/3/2019 5/3/2018    Magnesium Routine 5/18/2018 5/3/2019 5/3/2018    Phosphorus Routine 5/18/2018 5/3/2019 5/3/2018            Who to contact     If you have questions or need follow up information about today's clinic visit or your schedule please contact [facility] directly at [phone].  Normal or non-critical lab and imaging results will be communicated to you by MyChart, letter or phone within 4 business days after the clinic has received the results. If you do not hear from us within 7 days, please contact the clinic through [employer] or phone. If you have a critical or abnormal lab result, we will notify you by phone as soon as possible.  Submit refill requests through [employer] or call your pharmacy and they will forward the refill request to us. Please allow 3 business days for your refill to be completed.          Additional Information About Your Visit        [employer] Information     [employer] gives you secure access to your electronic health record. If you see a primary care provider, you can also send messages to your care team and make appointments. If you have questions, please call your primary care clinic.  If you do not have a primary care provider, please call [phone] and they will assist you.        Care EveryWhere ID     This is your Care EveryWhere ID. This could be used by other organizations to access your [facility] medical records  [id number]        Your Vitals Were     Pulse Temperature Respirations Height Pulse Oximetry BMI (Body Mass Index)    82 98  F (36.7  C) (Oral) 16 1.651 m (5' 5\") 95% 15.74 kg/m2       Blood Pressure from Last 3 Encounters:   05/02/18 113/62   04/27/18 98/64   04/20/18 109/68    Weight from Last 3 Encounters:   05/02/18 42.9 kg (94 lb 9.6 oz)   04/20/18 54 kg (119 lb)   04/03/18 59.6 kg (131 lb 8 oz)              We Performed the Following     **Comprehensive metabolic panel FUTURE anytime     CBC with platelets differential     Magnesium     Phosphorus        Primary Care Provider Office Phone # Fax #    Felisha Davis [phone] [address] [facility] [address]  [facility] [address]        Equal Access to Services     IONA DALEY AH: radha Horne, " melida jacobson seymourcyndy acosta toreyin hayaan adeeg kharash la'aan ah. So [facility] [phone].    ATENCIÓN: Si cristian sadler, tiene a gohsh disposición servicios gratuitos de asistencia lingüística. Jessica al [phone].    We comply with applicable federal civil rights laws and [place] laws. We do not discriminate on the basis of race, color, national origin, age, disability, sex, sexual orientation, or gender identity.            Thank you!     Thank you for choosing [facility]  for your care. Our goal is always to provide you with excellent care. Hearing back from our patients is one way we can continue to improve our services. Please take a few minutes to complete the written survey that you may receive in the mail after your visit with us. Thank you!             Your Updated Medication List - Protect others around you: Learn how to safely use, store and throw away your medicines at www.disposemymeds.org.          This list is accurate as of 5/2/18 11:59 PM.  Always use your most recent med list.                   Brand Name Dispense Instructions for use Diagnosis    acetaminophen 325 MG tablet    TYLENOL     Take 2 tablets (650 mg) by mouth every 4 hours as needed for pain    Closed fracture of one rib of right side, initial encounter       artificial saliva Liqd liquid      Swish and spit 5-10 mLs in mouth 4 times daily as needed for dry mouth    Mouth dryness       calcium-vitamin D 600-400 MG-UNIT per tablet    CALTRATE    60 tablet    Take 1 tablet by mouth daily    Age-related osteoporosis with current pathological fracture, initial encounter       diphenoxylate-atropine 2.5-0.025 MG per tablet    LOMOTIL    40 tablet    Take 1 tablet by mouth 4 times daily as needed for diarrhea . If having less than two bowel movements daily, DO NOT GIVE    Diarrhea, unspecified type, Malignant neoplasm of anal canal (H)       hydrocortisone 2.5 % cream    ANUSOL-HC    30 g    Place rectally 2 times  daily    External hemorrhoids       loperamide 2 MG tablet    LOPERAMIDE A-D    60 tablet    Take 1 tablet (2 mg) by mouth 4 times daily as needed for diarrhea . If having less than two bowel movements daily, DO NOT GIVE    Malignant neoplasm of anal canal (H)       LORazepam 0.5 MG tablet    ATIVAN    30 tablet    Take 1 tablet (0.5 mg) by mouth every 4 hours as needed (Anxiety, Nausea/Vomiting or Sleep)    Malignant neoplasm of anal canal (H)       magic mouthwash suspension    ENTER INGREDIENTS IN COMMENTS    500 mL    Swish and spit 5-10 mL four times daily as needed    Malignant neoplasm of anal canal (H)       methocarbamol 500 MG tablet    ROBAXIN    120 tablet    Take 1 tablet (500 mg) by mouth 4 times daily as needed for muscle spasms    Closed fracture of one rib of right side, initial encounter       multivitamin, therapeutic Tabs tablet      Take 1 tablet by mouth daily    Age-related osteoporosis with current pathological fracture, initial encounter       ondansetron 8 MG ODT tab    ZOFRAN-ODT    60 tablet    Take 1 tablet (8 mg) by mouth every 8 hours as needed For nausea    Malignant neoplasm of anal canal (H)       simethicone 40 MG/0.6ML suspension    MYLICON     Take 0.6 mLs (40 mg) by mouth every 6 hours as needed for cramping    Malignant neoplasm of anal canal (H), Flatulence, eructation and gas pain       traMADol 50 MG tablet    ULTRAM    20 tablet    Take 1 tablet (50 mg) by mouth every 6 hours    Closed fracture of one rib of right side, initial encounter       vitamin B complex with vitamin C Tabs tablet      Take 1 tablet by mouth daily

## 2018-05-02 NOTE — NURSING NOTE
Chief Complaint   Patient presents with     Port Draw     labs drawn from port (already accessed) by rn.       Pt brought in from nursing home by stretcher to a room in [facility].  This RN was asked to draw labs from patient's port.  Labs drawn from port (already accessed) by RN.   port flushed with saline, then patient reconnected to IV fluids which had been infusing.   Jada Eller RN

## 2018-05-02 NOTE — NURSING NOTE
"Oncology Rooming Note    May 2, 2018 12:43 PM   Elizabeth Taylor is a 77 year old female who presents for:    Chief Complaint   Patient presents with     Port Draw     labs drawn from port (already accessed) by rn.       Oncology Clinic Visit     2wk f/u Anal CA     Initial Vitals: /62  Pulse 82  Temp 98  F (36.7  C) (Oral)  Resp 16  Ht 1.651 m (5' 5\")  SpO2 95% Estimated body mass index is 19.8 kg/(m^2) as calculated from the following:    Height as of 4/20/18: 1.651 m (5' 5\").    Weight as of 4/20/18: 54 kg (119 lb). There is no height or weight on file to calculate BSA.  No Pain (0) Comment: Data Unavailable   No LMP recorded. Patient is postmenopausal.  Allergies reviewed: Yes  Medications reviewed: Yes    Medications: Medication refills not needed today.  Pharmacy name entered into [facility]:    [facility] - [place], [place] - [address]  [facility] - [place], [place] - [address]    Clinical concerns: none Tim was NOT notified.    10 minutes for nursing intake (face to face time)     DEBBY BARRERA LPN            "

## 2018-05-03 NOTE — TELEPHONE ENCOUNTER
Dr. Rosas has requested patient have PET CT and MRI 3T two months after last radiation dose (last dose 3/23/18).  Called patient and left a message on home and cell phone informing her of this (patient has not responded to my original message from 4/30/18).  Informed patient that we may have to push her clinic appointment back with Dr. Rosas, due to the timing of the scans.  Asked patient to call me back to schedule.  Provided my direct number.

## 2018-05-04 ENCOUNTER — MEDICAL CORRESPONDENCE (OUTPATIENT)
Dept: HEALTH INFORMATION MANAGEMENT | Facility: CLINIC | Age: 78
End: 2018-05-04

## 2018-05-04 ENCOUNTER — TELEPHONE (OUTPATIENT)
Dept: ONCOLOGY | Facility: CLINIC | Age: 78
End: 2018-05-04

## 2018-05-04 NOTE — TELEPHONE ENCOUNTER
Received [facility] Ambulance Physician Certification Statement for Ambulance Transportation early the week of 4/23/18 (after it was faxed 4/19).  Unfortunately forgot to review with Dr. Ray in clinic on 4/27. (Did received 2nd fax request earlier this week.)  Reviewed with him today, 5/4.  Faxed back completed form and left message for Mary that she should have received fax (per [employer] system).

## 2018-05-04 NOTE — TELEPHONE ENCOUNTER
Katherine left a message returning my call to patient.  Called and spoke with Katherine.  I explained that Elizabeth will need a PET CT and MRI prior to seeing Dr. Rosas.  Katherine informs me that Elizabeth is currently on a stretcher, but hopes to be off before these appointments.  In the event she is still on a strechter, I explained that it would be easiest to schedule these appointments on the same day she sees Dr. Madison, as they will already be at the [facility] hospital.  Katherine is in agreement.  Patient is scheduled 5/25/18 at 7:30 am (PET CT), 11:30 am (Dr. Madison), and 1:00 pm (MRI).  Katherine states that medical transportation will be dropping elizabeth off.  Elizabeth is concerned about transportation to her appointments during their stay.  Informed Katherine that our hospital patient transport can bring her ot her appointments if still on the stretcher.  Instructed that patient had to be NPO 6 hours prior to PET CT.  Appointment with Dr. Rosas 5/18/18 has been rescheduled to 5/30/18 at 2:00 pm.  Katherine confirms location for that appointment.  Katherine has my direct number for questions or concerns.

## 2018-05-04 NOTE — TELEPHONE ENCOUNTER
5/4/18    Have attempted to reach patient's TCU to discuss her transition off of TPN. Unable to get a hold of nursing or dietician after multiple attempts and did LM to call back. Will attempt to reach again next week.    Tim Larose PA-C

## 2018-05-07 ENCOUNTER — PATIENT OUTREACH (OUTPATIENT)
Dept: CARE COORDINATION | Facility: CLINIC | Age: 78
End: 2018-05-07

## 2018-05-07 NOTE — PROGRESS NOTES
Clinic Care Coordination Contact  Care Coordination Transition Communication         Clinical Data: Placed follow up call to facility to check on progress.    Transition to Facility:              Facility Name: [facility]              Contact name and phone number/fax: TCKISHORE GUZMAN is Hilary Beatty.   [phone]    Plan: RN/SW Care Coordinator will await notification from facility staff informing RN/SW Care Coordinator of patient's discharge plans/needs. RN/SW Care Coordinator will review chart and outreach to facility staff every 4 weeks and as needed.

## 2018-05-07 NOTE — LETTER
Health Care Home - Access Care Plan    About Me  Patient Name:  Elizabeth Taylor    YOB: 1940  Age:                             77 year old   [place] MRN:            [id number] Telephone Information:     Home Phone [phone]   Mobile [phone]       Address:    [address] [place] [address] Email address:  [email or web address]      Emergency Contact(s)  Name Relationship Lgl Grd Work Phone Home Phone Mobile Phone   1. ACE SANABRIA Friend    [phone]   2. AMARJIT CHAMBERS Sister   [phone]    3. DILLON STEWART  Friend   [phone]              Health Maintenance:      My Access Plan  Medical Emergency 911   Questions or concerns during clinic hours Primary Clinic Line, I will call the clinic directly: [facility] - [phone]   24 Hour Appointment Line [phone] or  [phone]) (toll free)   24 Hour Nurse Line [phone] (toll free)   Questions or concerns outside clinic hours 24 Hour Appointment Line, I will call the after-hours on-call line:   [facility] [phone] or [phone]) (toll-free)   Preferred Urgent Care     Preferred Hospital     Preferred Pharmacy [facility] - [place], [place] - [address]     Behavioral Health Crisis Line The National Suicide Prevention Lifeline at 1-130.818.2401 or 918     My Care Team Members  Patient Care Team       Relationship Specialty Notifications Start End    Felisha Davis, NP PCP - General Family Practice  3/17/18     Phone: [phone] Fax: [phone]         [address] [facility] [address]    Shen Ray MD MD Hematology & Oncology Admissions 2/2/18     Phone: [phone] Fax: [phone]         [address] [place] [address]    Pauline Aguayo RN Nurse Coordinator Hematology & Oncology Admissions 2/12/18     Phone: [phone] Pager: [phone]        Mercedes Duque RN Clinic Care  Coordinator Primary Care - CC  3/22/18     Phone: [phone] Fax: [phone]        Zaire Madison MD MD Radiation Oncology  4/17/18     Phone: [phone] Fax: [phone]         [address] [place] [address] [facility] [address]           My Medical and Care Information  Problem List   Patient Active Problem List   Diagnosis     CARDIOVASCULAR SCREENING; LDL GOAL LESS THAN 160     Dry eye syndrome     Malignant neoplasm of anal canal (H)     Diarrhea      Current Medications and Allergies:  See printed Medication Report

## 2018-05-09 ENCOUNTER — CARE COORDINATION (OUTPATIENT)
Dept: ONCOLOGY | Facility: CLINIC | Age: 78
End: 2018-05-09

## 2018-05-09 NOTE — PROGRESS NOTES
Author received message from colleague, Crissy Lyons, stating she received call from Lamar at [facility] regarding Elizabeth.  Lamar looking for information on  Elizabeth's upcoming PET, dates and prep. Can fax information to her at [phone]. Nurses desk is [phone] if you need it.   Also received in basket exchange between Tim Kelley PA-C and Dr. Cory tristan pt's TPN and if pt's TPN is being managed by TCU.      5/10:  Unable to fax appt information yesterday due to technical difficulties.  Sent fax today at 09:11 to Lamar's attention.  Spoke with Nia, nurse for Elizabeth today.  She stated they are tracking pt's eating to verify consistency of intake and dietician is involved so she thinks they are working on tapering TPN.  Also stated Lamar is a HUC but not there today.  No HUC available at this time to check on receipt of faxed appt information.     Left message for TCU HUC this afternoon reviewing had faxed information regarding pt's upcoming appts.  Requested call back to verify they received fax.

## 2018-05-10 NOTE — PROGRESS NOTES
Received message from ELIER Almeida at [facility] confirming she did receive faxed information on upcoming appts.  Stated she would call tomorrow to discuss couple questions regarding instructions for PET and MRI.

## 2018-05-11 ENCOUNTER — CARE COORDINATION (OUTPATIENT)
Dept: ONCOLOGY | Facility: CLINIC | Age: 78
End: 2018-05-11

## 2018-05-11 NOTE — PROGRESS NOTES
Received message from nurse Elle at [facility] asking to review instructions for PET and MRI scheduled for 5/25/18.  First left general message on phone number Elle left in message. Spoke with Elle after calling nurses desk number.  Reviewed pt instructions with Elle as well as gave her phone number for dept (as listed on PET instructions).  She appreciated call back.

## 2018-05-18 ENCOUNTER — ONCOLOGY VISIT (OUTPATIENT)
Dept: ONCOLOGY | Facility: CLINIC | Age: 78
End: 2018-05-18
Attending: INTERNAL MEDICINE
Payer: MEDICARE

## 2018-05-18 ENCOUNTER — APPOINTMENT (OUTPATIENT)
Dept: LAB | Facility: CLINIC | Age: 78
End: 2018-05-18
Attending: INTERNAL MEDICINE
Payer: MEDICARE

## 2018-05-18 VITALS
BODY MASS INDEX: 17.98 KG/M2 | WEIGHT: 107.9 LBS | HEIGHT: 65 IN | DIASTOLIC BLOOD PRESSURE: 59 MMHG | TEMPERATURE: 98.9 F | OXYGEN SATURATION: 97 % | SYSTOLIC BLOOD PRESSURE: 101 MMHG | HEART RATE: 94 BPM | RESPIRATION RATE: 18 BRPM

## 2018-05-18 DIAGNOSIS — C21.1 MALIGNANT NEOPLASM OF ANAL CANAL (H): ICD-10-CM

## 2018-05-18 LAB
ALBUMIN SERPL-MCNC: 2.5 G/DL (ref 3.4–5)
ALP SERPL-CCNC: 110 U/L (ref 40–150)
ALT SERPL W P-5'-P-CCNC: 26 U/L (ref 0–50)
ANION GAP SERPL CALCULATED.3IONS-SCNC: 7 MMOL/L (ref 3–14)
AST SERPL W P-5'-P-CCNC: 25 U/L (ref 0–45)
BASOPHILS # BLD AUTO: 0 10E9/L (ref 0–0.2)
BASOPHILS NFR BLD AUTO: 0.5 %
BILIRUB SERPL-MCNC: 0.7 MG/DL (ref 0.2–1.3)
BUN SERPL-MCNC: 16 MG/DL (ref 7–30)
CALCIUM SERPL-MCNC: 8.5 MG/DL (ref 8.5–10.1)
CHLORIDE SERPL-SCNC: 103 MMOL/L (ref 94–109)
CO2 SERPL-SCNC: 27 MMOL/L (ref 20–32)
CREAT SERPL-MCNC: 0.53 MG/DL (ref 0.52–1.04)
DIFFERENTIAL METHOD BLD: ABNORMAL
EOSINOPHIL # BLD AUTO: 0.2 10E9/L (ref 0–0.7)
EOSINOPHIL NFR BLD AUTO: 2.2 %
ERYTHROCYTE [DISTWIDTH] IN BLOOD BY AUTOMATED COUNT: 16.5 % (ref 10–15)
GFR SERPL CREATININE-BSD FRML MDRD: >90 ML/MIN/1.7M2
GLUCOSE SERPL-MCNC: 91 MG/DL (ref 70–99)
HCT VFR BLD AUTO: 32.7 % (ref 35–47)
HGB BLD-MCNC: 10.2 G/DL (ref 11.7–15.7)
IMM GRANULOCYTES # BLD: 0.1 10E9/L (ref 0–0.4)
IMM GRANULOCYTES NFR BLD: 0.8 %
LYMPHOCYTES # BLD AUTO: 1 10E9/L (ref 0.8–5.3)
LYMPHOCYTES NFR BLD AUTO: 10.9 %
MAGNESIUM SERPL-MCNC: 1.8 MG/DL (ref 1.6–2.3)
MCH RBC QN AUTO: 30.3 PG (ref 26.5–33)
MCHC RBC AUTO-ENTMCNC: 31.2 G/DL (ref 31.5–36.5)
MCV RBC AUTO: 97 FL (ref 78–100)
MONOCYTES # BLD AUTO: 0.8 10E9/L (ref 0–1.3)
MONOCYTES NFR BLD AUTO: 8.8 %
NEUTROPHILS # BLD AUTO: 6.7 10E9/L (ref 1.6–8.3)
NEUTROPHILS NFR BLD AUTO: 76.8 %
NRBC # BLD AUTO: 0 10*3/UL
NRBC BLD AUTO-RTO: 0 /100
PHOSPHATE SERPL-MCNC: 4.2 MG/DL (ref 2.5–4.5)
PLATELET # BLD AUTO: 308 10E9/L (ref 150–450)
POTASSIUM SERPL-SCNC: 4.2 MMOL/L (ref 3.4–5.3)
PROT SERPL-MCNC: 6.2 G/DL (ref 6.8–8.8)
RBC # BLD AUTO: 3.37 10E12/L (ref 3.8–5.2)
SODIUM SERPL-SCNC: 137 MMOL/L (ref 133–144)
WBC # BLD AUTO: 8.7 10E9/L (ref 4–11)

## 2018-05-18 PROCEDURE — 25000128 H RX IP 250 OP 636: Mod: ZF | Performed by: INTERNAL MEDICINE

## 2018-05-18 PROCEDURE — 80053 COMPREHEN METABOLIC PANEL: CPT | Performed by: PHYSICIAN ASSISTANT

## 2018-05-18 PROCEDURE — 36591 DRAW BLOOD OFF VENOUS DEVICE: CPT

## 2018-05-18 PROCEDURE — 85025 COMPLETE CBC W/AUTO DIFF WBC: CPT | Performed by: PHYSICIAN ASSISTANT

## 2018-05-18 PROCEDURE — 99214 OFFICE O/P EST MOD 30 MIN: CPT | Mod: ZP | Performed by: INTERNAL MEDICINE

## 2018-05-18 PROCEDURE — 83735 ASSAY OF MAGNESIUM: CPT | Performed by: PHYSICIAN ASSISTANT

## 2018-05-18 PROCEDURE — G0463 HOSPITAL OUTPT CLINIC VISIT: HCPCS | Mod: ZF

## 2018-05-18 PROCEDURE — 84100 ASSAY OF PHOSPHORUS: CPT | Performed by: PHYSICIAN ASSISTANT

## 2018-05-18 RX ORDER — HEPARIN SODIUM (PORCINE) LOCK FLUSH IV SOLN 100 UNIT/ML 100 UNIT/ML
5 SOLUTION INTRAVENOUS EVERY 8 HOURS
Status: DISCONTINUED | OUTPATIENT
Start: 2018-05-18 | End: 2018-05-26 | Stop reason: HOSPADM

## 2018-05-18 RX ADMIN — SODIUM CHLORIDE, PRESERVATIVE FREE 5 ML: 5 INJECTION INTRAVENOUS at 12:22

## 2018-05-18 ASSESSMENT — PAIN SCALES - GENERAL: PAINLEVEL: NO PAIN (0)

## 2018-05-18 NOTE — LETTER
5/18/2018     RE: Elizabeth Taylor  [address]  [facility] [facility] [place] [address]     Dear Colleague,    Thank you for referring your patient, Elizabeth Taylor, to the [facility]. Please see a copy of my visit note below.      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 She underwent a colonoscopy in April 2017 which noted to have small anal fissure along with internal hemorrhoids. Symptoms continued and  she was referred to colorectal surgery for further evaluation. On exam she was found to have an 1.5 cm anal lesion on the left side along with left groin palpable adenopathy. Biopsy of anal mass came back positive for squamous cell carcinoma. Patient underwent staging workup with MRI pelvis and a PET scan- showed PET avid left anal canal mass along with small left inguinal FDG avid lymph nodes.     02/12/18- Started on concurrent chemoradiation with 5FU/Mitomycin    03/13/18 Skipped Mitomycin and proceeded with 5FU at reduced dose given neutropenia/old age    03/19/18 -04/04/18 admitted to the [facility] with intractable diarrhea, nausea, generalized weakness and fall at home resulting in multiple right rib fractures. Hospital course was complicated by small bowel obstruction for which patient was started on TPN. Bowel obstruction was managed conservatively eventually improved prior to discharge . 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 She was eventually discharged to transitional care unit.    Continuous to improve with increased  energy, appetite and resolving skin changes    SUBJECTIVE      She is in clinic today for a 4week follow-up.   Accompanied by her friend. She states that her energy levels have improved and she is able to walk with assistance. Also has noticed improvement in appetite and a 10 pound weekends last month. TPN has been tapered to only at night for now. Diarrhea has resolved. Perineal wound continues to improve   Denies fever/chills, nausea/vomiting, chest pain, dyspnea.      PAST MEDICAL HISTORY     Past Medical History:   Diagnosis Date     Anal cancer (H)      Endometriosis, site unspecified      Thyroiditis, unspecified     Thryoiditis-resolved         CURRENT OUTPATIENT MEDICATIONS     Current Outpatient Prescriptions   Medication Sig Dispense Refill     acetaminophen (TYLENOL) 325 MG tablet Take 2 tablets (650 mg) by mouth every 4 hours as needed for pain       artificial saliva (BIOTENE DRY MOUTHWASH) LIQD liquid Swish and spit 5-10 mLs in mouth 4 times daily as needed for dry mouth       calcium-vitamin D (CALTRATE) 600-400 MG-UNIT per tablet Take 1 tablet by mouth daily 60 tablet      diphenoxylate-atropine (LOMOTIL) 2.5-0.025 MG per tablet Take 1 tablet by mouth 4 times daily as needed for diarrhea . If having less than two bowel movements daily, DO NOT GIVE 40 tablet 0     hydrocortisone (ANUSOL-HC) 2.5 % cream Place rectally 2 times daily 30 g 1     loperamide (LOPERAMIDE A-D) 2 MG tablet Take 1 tablet (2 mg) by mouth 4 times daily as needed for diarrhea . If having less than two bowel movements daily, DO NOT GIVE 60 tablet 3     LORazepam (ATIVAN) 0.5 MG tablet Take 1 tablet (0.5 mg) by mouth every 4 hours as needed (Anxiety, Nausea/Vomiting or Sleep) 30 tablet 0     magic mouthwash (ENTER INGREDIENTS IN COMMENTS) suspension Swish and spit 5-10 mL four times daily as needed 500 mL 1     multivitamin, therapeutic (THERA-VIT) TABS tablet Take 1 tablet by mouth daily  0     ondansetron (ZOFRAN-ODT) 8 MG ODT tab  Take 1 tablet (8 mg) by mouth every 8 hours as needed For nausea 60 tablet      simethicone (MYLICON) 40 MG/0.6ML suspension Take 0.6 mLs (40 mg) by mouth every 6 hours as needed for cramping       traMADol (ULTRAM) 50 MG tablet Take 1 tablet (50 mg) by mouth every 6 hours 20 tablet 0     vitamin B complex with vitamin C (VITAMIN  B COMPLEX) TABS tablet Take 1 tablet by mouth daily       methocarbamol (ROBAXIN) 500 MG tablet Take 1 tablet (500 mg) by mouth 4 times daily as needed for muscle spasms (Patient not taking: Reported on 5/2/2018) 120 tablet         ALLERGIES     Allergies   Allergen Reactions     Darvon [Propoxyphene]      Had reaction in teen years     Penicillins      As a child     Ketoconazole Rash        REVIEW OF SYSTEMS   As above in the HPI, o/w complete 12-point ROS was negative.     PHYSICAL EXAM   B/P: 131/82, T: 98.5, P: 78, R: 16  SpO2 Readings from Last 4 Encounters:   05/18/18 97%   05/02/18 95%   04/20/18 91%   04/17/18 96%     Wt Readings from Last 3 Encounters:   05/18/18 48.9 kg (107 lb 14.4 oz)   05/02/18 42.9 kg (94 lb 9.6 oz)   04/20/18 54 kg (119 lb)     GEN: alert. No distress  EYES:PERRLA  Mouth/ENT: Oropharynx is clear.   NECK: no cervical or supraclavicular lymphadenopathy  LUNGS: diminished breath sounds right lung base  CV: regular  ABDOMEN: soft, non-tender, non-distended  EXT: warm, well perfused, 1+ edema  NEURO: alert     LABORATORY AND IMAGING STUDIES     Lab Results   Component Value Date    WBC 8.7 05/18/2018     Lab Results   Component Value Date    RBC 3.37 05/18/2018     Lab Results   Component Value Date    HGB 10.2 05/18/2018     Lab Results   Component Value Date    HCT 32.7 05/18/2018     No components found for: MCT  Lab Results   Component Value Date    MCV 97 05/18/2018     Lab Results   Component Value Date    MCH 30.3 05/18/2018     Lab Results   Component Value Date    MCHC 31.2 05/18/2018     Lab Results   Component Value Date    RDW 16.5 05/18/2018     Lab  Results   Component Value Date     05/18/2018     Recent Labs   Lab Test  05/18/18   1226  05/02/18   1214   NA  137  137   POTASSIUM  4.2  4.3   CHLORIDE  103  102   CO2  27  28   ANIONGAP  7  6   GLC  91  107*   BUN  16  22   CR  0.53  0.38*   ELISE  8.5  8.2*        ASSESSMENT AND PLAN     77-year-old female with no significant past medical history who presented with complaints of bright red blood per rectum and was recently found to have an anal mass which on biopsy came back for positive for invasive squamous cell carcinoma. Staging workup showed a PET avid anal mass along with hypermetabolic left inguinal adenopathy.     - Squamous cell carcinoma Anal canal  T2N1- IIIA  Started on definitive chemoradiation with infusional FU 1000 mg/m2 on days 1 to 4 and 29 to 32, plus mitomycin 10 mg/m2 on days 1 and 29 (as tolerated), maximum 20 mg per dose- day 1 on 02/12/18  Day 29 03/13/18 Skipped mitomycin and proceeded with 5FU at reduced dose given neutropenia/old age.  03/19/18 -04/04/18 Developed intractable diarrhea, fatigue and dermatitis in the later half of treatment and had to be admitted. Was eventually discharged and is currently at a rehab facility.   Clinically he continues to improve every day with increased energy levels and appetite  Scheduled next week for scans and seeing Surgery following weeek  - Perianal wound secondary to recent radiation- Improving.   - Diarrhea-resolved  - Malnutrition  Oral intake has significantly  improved. TPN decreased to nights only . Recommend to discontinue TPN once nutritional status adequate.  -Generalized weakness  Patient continues to work with PT/OT at the nursing facility. Continues to improve    RTC in 4 weeks with me. She was advised to return sooner prn.   Chart documentation with Dragon Voice recognition Software. Although reviewed after completion, some words and grammatical errors may remain.  Shen Ray MD  Attending Physician   Hematology/Medical  Oncology

## 2018-05-18 NOTE — NURSING NOTE
"Oncology Rooming Note    May 18, 2018 12:54 PM   Elizabeth Taylor is a 77 year old female who presents for:    Chief Complaint   Patient presents with     Port Draw     Right port already access from outside facility, labs drawn and sent, flushed with saline and heparin, vitals completed, checked into next appointment.     Oncology Clinic Visit     Return visit related to Anal Cancer     Initial Vitals: /59 (BP Location: Left arm, Patient Position: Sitting, Cuff Size: Adult Regular)  Pulse 94  Temp 98.9  F (37.2  C) (Axillary)  Resp 18  Ht 1.651 m (5' 5\")  Wt 48.9 kg (107 lb 14.4 oz)  SpO2 97%  BMI 17.96 kg/m2 Estimated body mass index is 17.96 kg/(m^2) as calculated from the following:    Height as of this encounter: 1.651 m (5' 5\").    Weight as of this encounter: 48.9 kg (107 lb 14.4 oz). Body surface area is 1.5 meters squared.  No Pain (0) Comment: Data Unavailable   No LMP recorded. Patient is postmenopausal.  Allergies reviewed: Yes  Medications reviewed: Yes    Medications: Medication refills not needed today.  Pharmacy name entered into [employer]:    [facility] - [place], [place] - [address]  [facility] [place], [place] - [address] [facility] [facility]    Clinical concerns: No new concerns. Provider was notified.    10 minutes for nursing intake (face to face time)     Heather Alvarenga LPN            "

## 2018-05-18 NOTE — PROGRESS NOTES
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 She underwent a colonoscopy in April 2017 which noted to have small anal fissure along with internal hemorrhoids. Symptoms continued and  she was referred to colorectal surgery for further evaluation. On exam she was found to have an 1.5 cm anal lesion on the left side along with left groin palpable adenopathy. Biopsy of anal mass came back positive for squamous cell carcinoma. Patient underwent staging workup with MRI pelvis and a PET scan- showed PET avid left anal canal mass along with small left inguinal FDG avid lymph nodes.     02/12/18- Started on concurrent chemoradiation with 5FU/Mitomycin    03/13/18 Skipped Mitomycin and proceeded with 5FU at reduced dose given neutropenia/old age    03/19/18 -04/04/18 admitted to the [facility] with intractable diarrhea, nausea, generalized weakness and fall at home resulting in multiple right rib fractures. Hospital course was complicated by small bowel obstruction for which patient was started on TPN. Bowel obstruction was managed conservatively eventually improved prior to discharge . 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 She was eventually discharged to transitional care unit.    Continuous to improve with increased energy, appetite and resolving skin changes    SUBJECTIVE      She is in clinic today for a 4week follow-up.   Accompanied by her friend. She states that her energy levels have improved and she is able to walk with assistance. Also has noticed  improvement in appetite and a 10 pound weekends last month. TPN has been tapered to only at night for now. Diarrhea has resolved. Perineal wound continues to improve   Denies fever/chills, nausea/vomiting, chest pain, dyspnea.      PAST MEDICAL HISTORY     Past Medical History:   Diagnosis Date     Anal cancer (H)      Endometriosis, site unspecified      Thyroiditis, unspecified     Thryoiditis-resolved         CURRENT OUTPATIENT MEDICATIONS     Current Outpatient Prescriptions   Medication Sig Dispense Refill     acetaminophen (TYLENOL) 325 MG tablet Take 2 tablets (650 mg) by mouth every 4 hours as needed for pain       artificial saliva (BIOTENE DRY MOUTHWASH) LIQD liquid Swish and spit 5-10 mLs in mouth 4 times daily as needed for dry mouth       calcium-vitamin D (CALTRATE) 600-400 MG-UNIT per tablet Take 1 tablet by mouth daily 60 tablet      diphenoxylate-atropine (LOMOTIL) 2.5-0.025 MG per tablet Take 1 tablet by mouth 4 times daily as needed for diarrhea . If having less than two bowel movements daily, DO NOT GIVE 40 tablet 0     hydrocortisone (ANUSOL-HC) 2.5 % cream Place rectally 2 times daily 30 g 1     loperamide (LOPERAMIDE A-D) 2 MG tablet Take 1 tablet (2 mg) by mouth 4 times daily as needed for diarrhea . If having less than two bowel movements daily, DO NOT GIVE 60 tablet 3     LORazepam (ATIVAN) 0.5 MG tablet Take 1 tablet (0.5 mg) by mouth every 4 hours as needed (Anxiety, Nausea/Vomiting or Sleep) 30 tablet 0     magic mouthwash (ENTER INGREDIENTS IN COMMENTS) suspension Swish and spit 5-10 mL four times daily as needed 500 mL 1     multivitamin, therapeutic (THERA-VIT) TABS tablet Take 1 tablet by mouth daily  0     ondansetron (ZOFRAN-ODT) 8 MG ODT tab Take 1 tablet (8 mg) by mouth every 8 hours as needed For nausea 60 tablet      simethicone (MYLICON) 40 MG/0.6ML suspension Take 0.6 mLs (40 mg) by mouth every 6 hours as needed for cramping       traMADol (ULTRAM) 50 MG tablet Take 1 tablet  (50 mg) by mouth every 6 hours 20 tablet 0     vitamin B complex with vitamin C (VITAMIN  B COMPLEX) TABS tablet Take 1 tablet by mouth daily       methocarbamol (ROBAXIN) 500 MG tablet Take 1 tablet (500 mg) by mouth 4 times daily as needed for muscle spasms (Patient not taking: Reported on 5/2/2018) 120 tablet         ALLERGIES     Allergies   Allergen Reactions     Darvon [Propoxyphene]      Had reaction in teen years     Penicillins      As a child     Ketoconazole Rash        REVIEW OF SYSTEMS   As above in the HPI, o/w complete 12-point ROS was negative.     PHYSICAL EXAM   B/P: 131/82, T: 98.5, P: 78, R: 16  SpO2 Readings from Last 4 Encounters:   05/18/18 97%   05/02/18 95%   04/20/18 91%   04/17/18 96%     Wt Readings from Last 3 Encounters:   05/18/18 48.9 kg (107 lb 14.4 oz)   05/02/18 42.9 kg (94 lb 9.6 oz)   04/20/18 54 kg (119 lb)     GEN: alert. No distress  EYES:PERRLA  Mouth/ENT: Oropharynx is clear.   NECK: no cervical or supraclavicular lymphadenopathy  LUNGS: diminished breath sounds right lung base  CV: regular  ABDOMEN: soft, non-tender, non-distended  EXT: warm, well perfused, 1+ edema  NEURO: alert     LABORATORY AND IMAGING STUDIES     Lab Results   Component Value Date    WBC 8.7 05/18/2018     Lab Results   Component Value Date    RBC 3.37 05/18/2018     Lab Results   Component Value Date    HGB 10.2 05/18/2018     Lab Results   Component Value Date    HCT 32.7 05/18/2018     No components found for: MCT  Lab Results   Component Value Date    MCV 97 05/18/2018     Lab Results   Component Value Date    MCH 30.3 05/18/2018     Lab Results   Component Value Date    MCHC 31.2 05/18/2018     Lab Results   Component Value Date    RDW 16.5 05/18/2018     Lab Results   Component Value Date     05/18/2018     Recent Labs   Lab Test  05/18/18   1226  05/02/18   1214   NA  137  137   POTASSIUM  4.2  4.3   CHLORIDE  103  102   CO2  27  28   ANIONGAP  7  6   GLC  91  107*   BUN  16  22   CR   0.53  0.38*   ELISE  8.5  8.2*        ASSESSMENT AND PLAN     77-year-old female with no significant past medical history who presented with complaints of bright red blood per rectum and was recently found to have an anal mass which on biopsy came back for positive for invasive squamous cell carcinoma. Staging workup showed a PET avid anal mass along with hypermetabolic left inguinal adenopathy.     - Squamous cell carcinoma Anal canal  T2N1- IIIA  Started on definitive chemoradiation with infusional FU 1000 mg/m2 on days 1 to 4 and 29 to 32, plus mitomycin 10 mg/m2 on days 1 and 29 (as tolerated), maximum 20 mg per dose- day 1 on 02/12/18  Day 29 03/13/18 Skipped mitomycin and proceeded with 5FU at reduced dose given neutropenia/old age.  03/19/18 -04/04/18 Developed intractable diarrhea, fatigue and dermatitis in the later half of treatment and had to be admitted. Was eventually discharged and is currently at a rehab facility.   Clinically he continues to improve every day with increased energy levels and appetite  Scheduled next week for scans and seeing Surgery following weeek  - Perianal wound secondary to recent radiation- Improving.   - Diarrhea-resolved  - Malnutrition  Oral intake has significantly  improved. TPN decreased to nights only . Recommend to discontinue TPN once nutritional status adequate.  -Generalized weakness  Patient continues to work with PT/OT at the nursing facility. Continues to improve    RTC in 4 weeks with me. She was advised to return sooner prn.   Chart documentation with Dragon Voice recognition Software. Although reviewed after completion, some words and grammatical errors may remain.  Shen Ray MD  Attending Physician   Hematology/Medical Oncology

## 2018-05-18 NOTE — MR AVS SNAPSHOT
After Visit Summary   5/18/2018    Elizabeth Taylor    MRN: [id number]           Patient Information     Date Of Birth          1940        Visit Information        Provider Department      5/18/2018 12:30 PM Shen Ray MD [facility]        Today's Diagnoses     Malignant neoplasm of anal canal (H)           Follow-ups after your visit        Follow-up notes from your care team     Return in about 4 weeks (around 6/15/2018) for Lab Work, Routine Visit.      Your next 10 appointments already scheduled     May 30, 2018  2:00 PM CDT   Return Visit with Emir Rosas MD   [facility] ([facility])    [address]  4th Floor  [facility] [address]   [phone]            Jun 22, 2018 11:00 AM CDT   [facility] Lab Draw with  [facility] DRAW   [facility] Draw ([facility])    [address]  [address]  [place] [id number] [phone]            Jun 22, 2018 11:30 AM CDT   (Arrive by 11:15 AM)   Return Visit with Shen Ray MD   [facility] ([facility])    [address] [place] [address]  [address]  [facility] [id number] [phone]              Future tests that were ordered for you today     Open Future Orders        Priority Expected Expires Ordered    PET Oncology Whole Body Routine  4/30/2019 4/30/2018            Who to contact     If you have questions or need follow up information about today's clinic visit or your schedule please contact [facility], located within [facility] directly at [phone].  Normal or non-critical lab and imaging results will be communicated to you by MyChart, letter or phone within 4 business days after the clinic has received the results. If you do not hear from us within 7 days, please contact the clinic through MyChart or phone. If you have a critical or abnormal lab result, we will  "notify you by phone as soon as possible.  Submit refill requests through [employer] or call your pharmacy and they will forward the refill request to us. Please allow 3 business days for your refill to be completed.          Additional Information About Your Visit        [facility] Information     [employer] gives you secure access to your electronic health record. If you see a primary care provider, you can also send messages to your care team and make appointments. If you have questions, please call your primary care clinic.  If you do not have a primary care provider, please call [phone] and they will assist you.        Care EveryWhere ID     This is your Care EveryWhere ID. This could be used by other organizations to access your [facility] medical records  [id number]        Your Vitals Were     Pulse Temperature Respirations Height Pulse Oximetry BMI (Body Mass Index)    94 98.9  F (37.2  C) (Axillary) 18 1.651 m (5' 5\") 97% 17.96 kg/m2       Blood Pressure from Last 3 Encounters:   No data found for BP    Weight from Last 3 Encounters:   No data found for Wt              Today, you had the following     No orders found for display       Primary Care Provider Office Phone # Fax #    Felisha LAURA Davis [phone]       [address] [facility] [address]        Equal Access to Services     IONA DALEY : Hadii aad ku hadasho Soomaali, waaxda luqadaha, qaybta kaalmada adeegyada, waxay wanda catalan. So [facility] [phone].    ATENCIÓN: Si habla español, tiene a ghosh disposición servicios gratuitos de asistencia lingüística. Llame al [phone].    We comply with applicable federal civil rights laws and [place] laws. We do not discriminate on the basis of race, color, national origin, age, disability, sex, sexual orientation, or gender identity.            Thank you!     Thank you for choosing [facility]  for your care. Our goal is always to provide you " with excellent care. Hearing back from our patients is one way we can continue to improve our services. Please take a few minutes to complete the written survey that you may receive in the mail after your visit with us. Thank you!             Your Updated Medication List - Protect others around you: Learn how to safely use, store and throw away your medicines at www.disposemymeds.org.          This list is accurate as of 5/18/18 11:59 PM.  Always use your most recent med list.                   Brand Name Dispense Instructions for use Diagnosis    acetaminophen 325 MG tablet    TYLENOL     Take 2 tablets (650 mg) by mouth every 4 hours as needed for pain    Closed fracture of one rib of right side, initial encounter       artificial saliva Liqd liquid      Swish and spit 5-10 mLs in mouth 4 times daily as needed for dry mouth    Mouth dryness       calcium-vitamin D 600-400 MG-UNIT per tablet    CALTRATE    60 tablet    Take 1 tablet by mouth daily    Age-related osteoporosis with current pathological fracture, initial encounter       diphenoxylate-atropine 2.5-0.025 MG per tablet    LOMOTIL    40 tablet    Take 1 tablet by mouth 4 times daily as needed for diarrhea . If having less than two bowel movements daily, DO NOT GIVE    Diarrhea, unspecified type, Malignant neoplasm of anal canal (H)       hydrocortisone 2.5 % cream    ANUSOL-HC    30 g    Place rectally 2 times daily    External hemorrhoids       loperamide 2 MG tablet    LOPERAMIDE A-D    60 tablet    Take 1 tablet (2 mg) by mouth 4 times daily as needed for diarrhea . If having less than two bowel movements daily, DO NOT GIVE    Malignant neoplasm of anal canal (H)       LORazepam 0.5 MG tablet    ATIVAN    30 tablet    Take 1 tablet (0.5 mg) by mouth every 4 hours as needed (Anxiety, Nausea/Vomiting or Sleep)    Malignant neoplasm of anal canal (H)       magic mouthwash suspension    ENTER INGREDIENTS IN COMMENTS    500 mL    Swish and spit 5-10 mL four  times daily as needed    Malignant neoplasm of anal canal (H)       methocarbamol 500 MG tablet    ROBAXIN    120 tablet    Take 1 tablet (500 mg) by mouth 4 times daily as needed for muscle spasms    Closed fracture of one rib of right side, initial encounter       multivitamin, therapeutic Tabs tablet      Take 1 tablet by mouth daily    Age-related osteoporosis with current pathological fracture, initial encounter       ondansetron 8 MG ODT tab    ZOFRAN-ODT    60 tablet    Take 1 tablet (8 mg) by mouth every 8 hours as needed For nausea    Malignant neoplasm of anal canal (H)       simethicone 40 MG/0.6ML suspension    MYLICON     Take 0.6 mLs (40 mg) by mouth every 6 hours as needed for cramping    Malignant neoplasm of anal canal (H), Flatulence, eructation and gas pain       traMADol 50 MG tablet    ULTRAM    20 tablet    Take 1 tablet (50 mg) by mouth every 6 hours    Closed fracture of one rib of right side, initial encounter       vitamin B complex with vitamin C Tabs tablet      Take 1 tablet by mouth daily

## 2018-05-18 NOTE — NURSING NOTE
Chief Complaint   Patient presents with     Port Draw     Right port already access from outside facility, labs drawn and sent, flushed with saline and heparin, vitals completed, checked into next appointment.   Kathy Cho,RN

## 2018-05-25 ENCOUNTER — HOSPITAL ENCOUNTER (OUTPATIENT)
Dept: MRI IMAGING | Facility: CLINIC | Age: 78
Discharge: HOME | End: 2018-05-25
Attending: COLON & RECTAL SURGERY | Admitting: COLON & RECTAL SURGERY
Payer: MEDICARE

## 2018-05-25 ENCOUNTER — OFFICE VISIT (OUTPATIENT)
Dept: RADIATION ONCOLOGY | Facility: CLINIC | Age: 78
End: 2018-05-25
Attending: RADIOLOGY
Payer: MEDICARE

## 2018-05-25 ENCOUNTER — HOSPITAL ENCOUNTER (OUTPATIENT)
Dept: PET IMAGING | Facility: CLINIC | Age: 78
End: 2018-05-25
Attending: COLON & RECTAL SURGERY
Payer: MEDICARE

## 2018-05-25 VITALS — BODY MASS INDEX: 18.5 KG/M2 | WEIGHT: 111.2 LBS

## 2018-05-25 DIAGNOSIS — C21.1 MALIGNANT NEOPLASM OF ANAL CANAL (H): ICD-10-CM

## 2018-05-25 DIAGNOSIS — C21.1 MALIGNANT NEOPLASM OF ANAL CANAL (H): Primary | ICD-10-CM

## 2018-05-25 LAB — GLUCOSE BLDC GLUCOMTR-MCNC: 95 MG/DL (ref 70–99)

## 2018-05-25 PROCEDURE — 25000128 H RX IP 250 OP 636: Performed by: COLON & RECTAL SURGERY

## 2018-05-25 PROCEDURE — A9585 GADOBUTROL INJECTION: HCPCS | Performed by: COLON & RECTAL SURGERY

## 2018-05-25 PROCEDURE — 72197 MRI PELVIS W/O & W/DYE: CPT

## 2018-05-25 PROCEDURE — 34300033 ZZH RX 343: Performed by: COLON & RECTAL SURGERY

## 2018-05-25 PROCEDURE — A9552 F18 FDG: HCPCS | Performed by: COLON & RECTAL SURGERY

## 2018-05-25 PROCEDURE — G0463 HOSPITAL OUTPT CLINIC VISIT: HCPCS | Performed by: RADIOLOGY

## 2018-05-25 PROCEDURE — 82962 GLUCOSE BLOOD TEST: CPT

## 2018-05-25 PROCEDURE — 78816 PET IMAGE W/CT FULL BODY: CPT | Mod: PS

## 2018-05-25 RX ORDER — GADOBUTROL 604.72 MG/ML
7.5 INJECTION INTRAVENOUS ONCE
Status: COMPLETED | OUTPATIENT
Start: 2018-05-25 | End: 2018-05-25

## 2018-05-25 RX ORDER — IOPAMIDOL 755 MG/ML
66 INJECTION, SOLUTION INTRAVASCULAR ONCE
Status: COMPLETED | OUTPATIENT
Start: 2018-05-25 | End: 2018-05-25

## 2018-05-25 RX ADMIN — SODIUM CHLORIDE, PRESERVATIVE FREE 500 UNITS: 5 INJECTION INTRAVENOUS at 15:03

## 2018-05-25 RX ADMIN — GADOBUTROL 5 ML: 604.72 INJECTION INTRAVENOUS at 15:10

## 2018-05-25 RX ADMIN — IOPAMIDOL 66 ML: 755 INJECTION, SOLUTION INTRAVENOUS at 08:42

## 2018-05-25 RX ADMIN — FLUDEOXYGLUCOSE F-18 10.2 MCI.: 500 INJECTION, SOLUTION INTRAVENOUS at 08:42

## 2018-05-25 RX ADMIN — GLUCAGON HYDROCHLORIDE 1 MG: KIT at 14:20

## 2018-05-25 NOTE — PROGRESS NOTES
FOLLOW-UP VISIT    Patient Name: Elizabeth Taylor      : 1940     Age: 77 year old        ______________________________________________________________________________     Chief Complaint   Patient presents with     Cancer     Pt is here for a follow up:Anal 5400 cGy completed on 3/23/18     Wt 50.4 kg (111 lb 3.2 oz)  BMI 18.5 kg/m2     Pain  Denies  Labs  Other Labs: No    Imaging  PET: today  MRI: today      Other Appointments:     MD Name: Dr Ray Appointment Date:    MD Name: Appointment Date:   MD Name: Appointment Date:   Other Appointment Notes:     Residual Radiation side effect: Diarrhea, some pain, skin reaction is much improved     Additional Instructions: Level 3:  10 min face to face time  Nurse face-to-face time: Level 3:  10 min face to face time

## 2018-05-25 NOTE — LETTER
2018      RE: Elizabeth Taylor  [address] 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 Radiation completed 3/23/2018.      HPI:   Ms. 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 Staging evaluation revealed a 2.5 cm mass located approximately 1 cm from the anal verge with involvement of the internal anal sphincter but not external sphincter or levator ani in addition to two FDG-avid left inguinal lymph nodes.      She underwent treatment with definitive chemoradiotherapy with curative intent. The primary tumor was treated to a total dose of 54 Gy while the node-positive left groin received 50.4 Gy and the right groin and pelvis was treated to 45 Gy in 30 daily fractions using a simultaneous integrated boost technique. Radiation was delivered with concurrent Mitomycin-C and infusional 5-FU with Mitomycin held with her second cycle of therapy due to a concern for treatment-related toxicities given her age and borderline functional status.      Ms. Taylor tolerated the initiation of chemoradiotherapy reasonably well with relatively mild acute treatment-related toxicities. Approximately midway through her radiotherapy treatment course, she developed worsening diarrhea which was initially managed with loperamide and escalated to tincture of opium with IV fluid rehydration when she continued to have >6-8 bowel movements per day. Despite  escalation in her medical management, she continued to have intractable diarrhea and the decision was made to admit her at the beginning of her final week of radiotherapy on 3/19/2018 for further evaluation and cares due to her uncontrolled symptoms, declining functional status and concern for her ability to care for herself after she suffered a fall while at home. She completed the remainder of her radiotherapy treatment as an inpatient without incident. Overall, she did not have any unplanned breaks in radiotherapy. She presents today for routine follow up, 2 months after treatment completion.       INTERVAL HISTORY:   Prior to her exam today, Ms. Taylor had a PET/CT completed.  This does not show any evidence of residual disease in the anus or in the left groin. There is no evidence of distant metastases. She has an MRI scheduled to be completed following her appointment today. Clinically, Ms. Taylor reports feeling significantly improved compared to her last follow-up 1 month ago. She is still living in the TCU. However, she has had significant improvement in both her fatigue and weakness and she is working with PT and OT several times daily. She is having better control of her bowel movements and has started to have more formed stools over the past few days. She continues to have some discomfort around her perianal skin, but this is significantly improved and is well controlled with lidocaine. She reports having cramping of her proximal legs at night but notes that this is improved when she walks a lot during the day. She would like written permission to walk more frequently during the day. Her appetite is very good, and she has been gaining weight. She is no longer using her PEG tube. She has not been using a vaginal dilator as her mucosa has been too raw to tolerate this. She otherwise has no additional concerns or complaints and remainder of her ROS is negative.    PHYSICAL EXAM:   Weight 50.4 kg    Gen:  Alert, in NAD. Sitting comfortably in a wheelchair.   Pulm: No wheezing, stridor or respiratory distress  CV: Well-perfused, no cyanosis, no pedal edema  Pelvic/Rectal: Significant improvement of dermatitis in bilateral groin. Still some resolving dermatitis present over left groin. Perianal skin appears to be healing well with some hyperpigmentation but no desquamation appreciated. There continues to be a small amount of induration within the external aspect of left posterolateral anal canal corresponding to the site of the previous tumor but no obvious masses nor any palpable inguinal adenopathy. The perianal region continues to be quite tender and examination of the more proximal aspect of the anal canal by BRITTNEY was unable to be performed secondary to patient discomfort.    LABS AND IMAGING:  Reviewed.    IMPRESSION:   Ms. Taylor is a 77 year old female with a stage IIIA squamous cell carcinoma of the anal canal with significant treatment related toxicities requiring hospital admission towards the end of treatment. She continues to slowly recover while residing at a TCU. She has had a nice response to therapy both by restaging imaging and physical exam.     PLAN:   1. Follow up with medical oncology (Dr. Ray) as scheduled in late 6/2018  2. Follow-up with colorectal surgery (Dr. Rosas) next week   3. Follow-up in radiation oncology clinic in 1 month  4. Recommend beginning vaginal dilator use. If she is unable to tolerate small size dilator, recommend using tampon. Lidocaine gel was given to her to help assist with placement of dilator.     Juan Arzate MD  Resident, Radiation Oncology      Attending addendum:   I saw and examined the patient with the resident and agree with the documented plan of care.    Much improved on exam although continues to be quite deconditioned compared with her pre-treatment baseline. She has had a good response to chemoRT although a BRITTNEY was limited by patient discomfort. Will  see colorectal surgery (Dr. Rosas) next week for post-treatment assessment. I will see her back in clinic in 1 month for short-term routine follow-up.    Zaire Madison MD/PhD  Dept of Radiation Oncology  [facility]      FOLLOW-UP VISIT    Patient Name: Elizabeth Taylor      : 1940     Age: 77 year old        ______________________________________________________________________________     Chief Complaint   Patient presents with     Cancer     Pt is here for a follow up:Anal 5400 cGy completed on 3/23/18     Wt 50.4 kg (111 lb 3.2 oz)  BMI 18.5 kg/m2     Pain  Denies  Labs  Other Labs: No    Imaging  PET: today  MRI: today      Other Appointments:     MD Name: Dr Ray Appointment Date:    MD Name: Appointment Date:   MD Name: Appointment Date:   Other Appointment Notes:     Residual Radiation side effect: Diarrhea, some pain, skin reaction is much improved     Additional Instructions: Level 3:  10 min face to face time  Nurse face-to-face time: Level 3:  10 min face to face time          Zaire Madison MD

## 2018-05-25 NOTE — PROGRESS NOTES
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 Radiation completed 3/23/2018.      HPI:   Ms. 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 Staging evaluation revealed a 2.5 cm mass located approximately 1 cm from the anal verge with involvement of the internal anal sphincter but not external sphincter or levator ani in addition to two FDG-avid left inguinal lymph nodes.      She underwent treatment with definitive chemoradiotherapy with curative intent. The primary tumor was treated to a total dose of 54 Gy while the node-positive left groin received 50.4 Gy and the right groin and pelvis was treated to 45 Gy in 30 daily fractions using a simultaneous integrated boost technique. Radiation was delivered with concurrent Mitomycin-C and infusional 5-FU with Mitomycin held with her second cycle of therapy due to a concern for treatment-related toxicities given her age and borderline functional status.      Ms. Taylor tolerated the initiation of chemoradiotherapy reasonably well with relatively mild acute treatment-related toxicities. Approximately midway through her radiotherapy treatment course, she developed worsening diarrhea which was initially managed with loperamide and escalated to tincture of opium with IV fluid rehydration when she continued to have >6-8 bowel movements per day. Despite escalation in her medical management, she continued to have intractable diarrhea and the decision  was made to admit her at the beginning of her final week of radiotherapy on 3/19/2018 for further evaluation and cares due to her uncontrolled symptoms, declining functional status and concern for her ability to care for herself after she suffered a fall while at home. She completed the remainder of her radiotherapy treatment as an inpatient without incident. Overall, she did not have any unplanned breaks in radiotherapy. She presents today for routine follow up, 2 months after treatment completion.       INTERVAL HISTORY:   Prior to her exam today, Ms. Taylor had a PET/CT completed.  This does not show any evidence of residual disease in the anus or in the left groin. There is no evidence of distant metastases. She has an MRI scheduled to be completed following her appointment today. Clinically, Ms. Taylor reports feeling significantly improved compared to her last follow-up 1 month ago. She is still living in the TCU. However, she has had significant improvement in both her fatigue and weakness and she is working with PT and OT several times daily. She is having better control of her bowel movements and has started to have more formed stools over the past few days. She continues to have some discomfort around her perianal skin, but this is significantly improved and is well controlled with lidocaine. She reports having cramping of her proximal legs at night but notes that this is improved when she walks a lot during the day. She would like written permission to walk more frequently during the day. Her appetite is very good, and she has been gaining weight. She is no longer using her PEG tube. She has not been using a vaginal dilator as her mucosa has been too raw to tolerate this. She otherwise has no additional concerns or complaints and remainder of her ROS is negative.    PHYSICAL EXAM:   Weight 50.4 kg    Gen: Alert, in NAD. Sitting comfortably in a wheelchair.   Pulm: No wheezing, stridor or respiratory  distress  CV: Well-perfused, no cyanosis, no pedal edema  Pelvic/Rectal: Significant improvement of dermatitis in bilateral groin. Still some resolving dermatitis present over left groin. Perianal skin appears to be healing well with some hyperpigmentation but no desquamation appreciated. There continues to be a small amount of induration within the external aspect of left posterolateral anal canal corresponding to the site of the previous tumor but no obvious masses nor any palpable inguinal adenopathy. The perianal region continues to be quite tender and examination of the more proximal aspect of the anal canal by BRITTNEY was unable to be performed secondary to patient discomfort.    LABS AND IMAGING:  Reviewed.    IMPRESSION:   Ms. Taylor is a 77 year old female with a stage IIIA squamous cell carcinoma of the anal canal with significant treatment related toxicities requiring hospital admission towards the end of treatment. She continues to slowly recover while residing at a [facility]. She has had a nice response to therapy both by restaging imaging and physical exam.     PLAN:   1. Follow up with medical oncology (Dr. Ray) as scheduled in late 6/2018  2. Follow-up with colorectal surgery (Dr. Rosas) next week   3. Follow-up in radiation oncology clinic in 1 month  4. Recommend beginning vaginal dilator use. If she is unable to tolerate small size dilator, recommend using tampon. Lidocaine gel was given to her to help assist with placement of dilator.     Juan Arzate MD  Resident, Radiation Oncology      Attending addendum:   I saw and examined the patient with the resident and agree with the documented plan of care.    Much improved on exam although continues to be quite deconditioned compared with her pre-treatment baseline. She has had a good response to chemoRT although a BRITTNEY was limited by patient discomfort. Will see colorectal surgery (Dr. Rosas) next week for post-treatment assessment. I will see her  back in clinic in 1 month for short-term routine follow-up.    Zaire Madison MD/PhD  Dept of Radiation Oncology  [facility]

## 2018-05-25 NOTE — LETTER
2018       RE: Elizabeth Taylor  [address] [facility] [address]     Dear Colleague,    Thank you for referring your patient, Elizabeth Taylor, to the RADIATION ONCOLOGY CLINIC. Please see a copy of my visit note below.    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 Radiation completed 3/23/2018.      HPI:   Ms. 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 Staging evaluation revealed a 2.5 cm mass located approximately 1 cm from the anal verge with involvement of the internal anal sphincter but not external sphincter or levator ani in addition to two FDG-avid left inguinal lymph nodes.      She underwent treatment with definitive chemoradiotherapy with curative intent. The primary tumor was treated to a total dose of 54 Gy while the node-positive left groin received 50.4 Gy and the right groin and pelvis was treated to 45 Gy in 30 daily fractions using a simultaneous integrated boost technique. Radiation was delivered with concurrent Mitomycin-C and infusional 5-FU with Mitomycin held with her second cycle of therapy due to a concern for treatment-related toxicities given her age and borderline functional status.      Ms. Taylor tolerated the initiation of chemoradiotherapy reasonably well with relatively mild acute treatment-related toxicities. Approximately midway through her radiotherapy treatment course, she developed worsening diarrhea which was initially  managed with loperamide and escalated to tincture of opium with IV fluid rehydration when she continued to have >6-8 bowel movements per day. Despite escalation in her medical management, she continued to have intractable diarrhea and the decision was made to admit her at the beginning of her final week of radiotherapy on 3/19/2018 for further evaluation and cares due to her uncontrolled symptoms, declining functional status and concern for her ability to care for herself after she suffered a fall while at home. She completed the remainder of her radiotherapy treatment as an inpatient without incident. Overall, she did not have any unplanned breaks in radiotherapy. She presents today for routine follow up, 2 months after treatment completion.       INTERVAL HISTORY:   Prior to her exam today, Ms. Taylor had a PET/CT completed.  This does not show any evidence of residual disease in the anus or in the left groin. There is no evidence of distant metastases. She has an MRI scheduled to be completed following her appointment today. Clinically, Ms. Taylor reports feeling significantly improved compared to her last follow-up 1 month ago. She is still living in the TCU. However, she has had significant improvement in both her fatigue and weakness and she is working with PT and OT several times daily. She is having better control of her bowel movements and has started to have more formed stools over the past few days. She continues to have some discomfort around her perianal skin, but this is significantly improved and is well controlled with lidocaine. She reports having cramping of her proximal legs at night but notes that this is improved when she walks a lot during the day. She would like written permission to walk more frequently during the day. Her appetite is very good, and she has been gaining weight. She is no longer using her PEG tube. She has not been using a vaginal dilator as her mucosa has been too raw to  tolerate this. She otherwise has no additional concerns or complaints and remainder of her ROS is negative.    PHYSICAL EXAM:   Weight 50.4 kg    Gen: Alert, in NAD. Sitting comfortably in a wheelchair.   Pulm: No wheezing, stridor or respiratory distress  CV: Well-perfused, no cyanosis, no pedal edema  Pelvic/Rectal: Significant improvement of dermatitis in bilateral groin. Still some resolving dermatitis present over left groin. Perianal skin appears to be healing well with some hyperpigmentation but no desquamation appreciated. There continues to be a small amount of induration within the external aspect of left posterolateral anal canal corresponding to the site of the previous tumor but no obvious masses nor any palpable inguinal adenopathy. The perianal region continues to be quite tender and examination of the more proximal aspect of the anal canal by BRITTNEY was unable to be performed secondary to patient discomfort.    LABS AND IMAGING:  Reviewed.    IMPRESSION:   Ms. Taylor is a 77 year old female with a stage IIIA squamous cell carcinoma of the anal canal with significant treatment related toxicities requiring hospital admission towards the end of treatment. She continues to slowly recover while residing at a TCU. She has had a nice response to therapy both by restaging imaging and physical exam.     PLAN:   1. Follow up with medical oncology (Dr. Ray) as scheduled in late 6/2018  2. Follow-up with colorectal surgery (Dr. Rosas) next week   3. Follow-up in radiation oncology clinic in 1 month  4. Recommend beginning vaginal dilator use. If she is unable to tolerate small size dilator, recommend using tampon. Lidocaine gel was given to her to help assist with placement of dilator.     Juan Arzate MD  Resident, Radiation Oncology      Attending addendum:   I saw and examined the patient with the resident and agree with the documented plan of care.    Much improved on exam although continues to be quite  deconditioned compared with her pre-treatment baseline. She has had a good response to chemoRT although a BRITTNEY was limited by patient discomfort. Will see colorectal surgery (Dr. Rosas) next week for post-treatment assessment. I will see her back in clinic in 1 month for short-term routine follow-up.    Zaire Madison MD/PhD  Dept of Radiation Oncology  [facility]      FOLLOW-UP VISIT    Patient Name: Elizabeth Taylor      : 1940     Age: 77 year old        ______________________________________________________________________________     Chief Complaint   Patient presents with     Cancer     Pt is here for a follow up:Anal 5400 cGy completed on 3/23/18     Wt 50.4 kg (111 lb 3.2 oz)  BMI 18.5 kg/m2     Pain  Denies  Labs  Other Labs: No    Imaging  PET: today  MRI: today      Other Appointments:     MD Name: Dr Ray Appointment Date:    MD Name: Appointment Date:   MD Name: Appointment Date:   Other Appointment Notes:     Residual Radiation side effect: Diarrhea, some pain, skin reaction is much improved     Additional Instructions: Level 3:  10 min face to face time  Nurse face-to-face time: Level 3:  10 min face to face time          Again, thank you for allowing me to participate in the care of your patient.      Sincerely,    Zaire Madison MD

## 2018-05-25 NOTE — MR AVS SNAPSHOT
After Visit Summary   5/25/2018    Elizabeth Taylor    MRN: [id number]           Patient Information     Date Of Birth          1940        Visit Information        Provider Department      5/25/2018 11:30 AM Zaire Maidson MD [facility]         Follow-ups after your visit        Your next 10 appointments already scheduled     May 02, 2018 11:45 AM CDT   Masonic Lab Draw with  MASONIC LAB DRAW   [facility] Masonic Lab Draw ([facility])    [address] [place] [address]  [address]  [facility] [address]   [phone]            May 02, 2018 12:10 PM CDT   (Arrive by 11:55 AM)   Return Visit with HAIDER Avina   [facility] ([facility])    [address] [place] [address]  [address]  [facility] [address]   [phone]            May 18, 2018 10:00 AM CDT   (Arrive by 9:45 AM)   Return Visit with Emir Rosas MD   [facility] Colon and Rectal Surgery ([facility])    [address] [place] [address]  4th Floor  [facility] [address]   [phone]            May 18, 2018 12:00 PM CDT   Masonic Lab Draw with [facility] MASONIC LAB DRAW   [facility] Masonic Lab Draw ([facility])    [address] [place] [address]  [address] [place] [address]   [phone]            May 18, 2018 12:30 PM CDT   (Arrive by 12:15 PM)   Return Visit with Shen Ray MD   [facility] ([facility])    [address] [place] [address]  [address] [place] [address]   [phone]            May 25, 2018 11:30 AM CDT   Return Visit with Zaire Madison MD   [facility] ([facility])    [facility]  1st Floor  [address] [facility] [address] [phone]              Who to contact     Please call your clinic at [phone] to:    Ask questions about your health    Make or cancel  appointments    Discuss your medicines    Learn about your test results    Speak to your doctor            Additional Information About Your Visit        DECAhart Information     [employer] gives you secure access to your electronic health record. If you see a primary care provider, you can also send messages to your care team and make appointments. If you have questions, please call your primary care clinic.  If you do not have a primary care provider, please call [phone] and they will assist you.      [employer] is an electronic gateway that provides easy, online access to your medical records. With [employer], you can request a clinic appointment, read your test results, renew a prescription or communicate with your care team.     To access your existing account, please contact your [facility] or call [phone] for assistance.        Care EveryWhere ID     This is your Care EveryWhere ID. This could be used by other organizations to access your [place] medical records  [id number]         Blood Pressure from Last 3 Encounters:   04/27/18 98/64   04/20/18 109/68   04/17/18 97/57    Weight from Last 3 Encounters:   04/20/18 54 kg (119 lb)   04/03/18 59.6 kg (131 lb 8 oz)   03/19/18 59.3 kg (130 lb 12.8 oz)              Today, you had the following     No orders found for display       Primary Care Provider Office Phone # Fax #    Felisha Davis [phone]       [address] [facility] [address]        Equal Access to Services     IONA DALEY : Hadii aad ku hadasho Sotheodoreali, waaxda luqadaha, qaybta kaalmada adeegyada, waxay wanda catalan. So [facility] [phone].    ATENCIÓN: Si habla español, tiene a ghosh disposición servicios gratuitos de asistencia lingüística. Llame al [phone].    We comply with applicable federal civil rights laws and [place] laws. We do not discriminate on the basis of race, color, national origin, age,  disability, sex, sexual orientation, or gender identity.            Thank you!     Thank you for choosing [facility]  for your care. Our goal is always to provide you with excellent care. Hearing back from our patients is one way we can continue to improve our services. Please take a few minutes to complete the written survey that you may receive in the mail after your visit with us. Thank you!             Your Updated Medication List - Protect others around you: Learn how to safely use, store and throw away your medicines at www.disposemymeds.org.          This list is accurate as of 4/30/18  1:21 PM.  Always use your most recent med list.                   Brand Name Dispense Instructions for use Diagnosis    acetaminophen 325 MG tablet    TYLENOL     Take 2 tablets (650 mg) by mouth every 4 hours as needed for pain    Closed fracture of one rib of right side, initial encounter       artificial saliva Liqd liquid      Swish and spit 5-10 mLs in mouth 4 times daily as needed for dry mouth    Mouth dryness       calcium-vitamin D 600-400 MG-UNIT per tablet    CALTRATE    60 tablet    Take 1 tablet by mouth daily    Age-related osteoporosis with current pathological fracture, initial encounter       diphenoxylate-atropine 2.5-0.025 MG per tablet    LOMOTIL    40 tablet    Take 1 tablet by mouth 4 times daily as needed for diarrhea . If having less than two bowel movements daily, DO NOT GIVE    Diarrhea, unspecified type, Malignant neoplasm of anal canal (H)       hydrocortisone 2.5 % cream    ANUSOL-HC    30 g    Place rectally 2 times daily    External hemorrhoids       loperamide 2 MG tablet    LOPERAMIDE A-D    60 tablet    Take 1 tablet (2 mg) by mouth 4 times daily as needed for diarrhea . If having less than two bowel movements daily, DO NOT GIVE    Malignant neoplasm of anal canal (H)       LORazepam 0.5 MG tablet    ATIVAN    30 tablet    Take 1 tablet (0.5 mg) by mouth every 4 hours as needed  (Anxiety, Nausea/Vomiting or Sleep)    Malignant neoplasm of anal canal (H)       magic mouthwash suspension    ENTER INGREDIENTS IN COMMENTS    500 mL    Swish and spit 5-10 mL four times daily as needed    Malignant neoplasm of anal canal (H)       methocarbamol 500 MG tablet    ROBAXIN    120 tablet    Take 1 tablet (500 mg) by mouth 4 times daily as needed for muscle spasms    Closed fracture of one rib of right side, initial encounter       multivitamin, therapeutic Tabs tablet      Take 1 tablet by mouth daily    Age-related osteoporosis with current pathological fracture, initial encounter       ondansetron 8 MG ODT tab    ZOFRAN-ODT    60 tablet    Take 1 tablet (8 mg) by mouth every 8 hours as needed For nausea    Malignant neoplasm of anal canal (H)       simethicone 40 MG/0.6ML suspension    MYLICON     Take 0.6 mLs (40 mg) by mouth every 6 hours as needed for cramping    Malignant neoplasm of anal canal (H), Flatulence, eructation and gas pain       traMADol 50 MG tablet    ULTRAM    20 tablet    Take 1 tablet (50 mg) by mouth every 6 hours    Closed fracture of one rib of right side, initial encounter       vitamin B complex with vitamin C Tabs tablet      Take 1 tablet by mouth daily

## 2018-05-29 NOTE — PROGRESS NOTES
"Colon and Rectal Surgery Follow-up Clinic Note      RE: Elizabeth Taylor  : 1940  SALBADOR: 2018     DIAGNOSIS: mT2N1- IIIA anal canal squamous cell carcinoma, (s/p CXRT - 5,400 cGy to anal canal, 5,040 to left groin and 4,500 cGy to pelvis and right groin given with concurrent Mitomycin-C and 5-FU).    INTERVAL HISTORY: Finalized CXRT on 3/23/18. Did not tolerate this well at all. Currently admitted at TCU. Was on TPN for approx 1 month but appetite has recently improved. Continues to have Joiner catheter given vulvar excoriation. Denies increased pain, fevers, or chills. Tolerating diet well. Having 2-3 BM's per day with significant fecal urgency and incontinence.     Physical Examination:  /68 (BP Location: Left arm, Patient Position: Sitting, Cuff Size: Adult Small)  Pulse 97  Temp 98.1  F (36.7  C) (Oral)  Ht 5' 5\"  Wt 111 lb  SpO2 98%  BMI 18.47 kg/m2  Abdomen soft, NT. No inguinal adenopathy palpated.  Perianal skin with mild excoriation and hyperpigmentation. Perivulvar excoriation/ulceration. No evidence of active infection.  BRITTNEY: did not tolerate because of pain. No external lesions.  Anoscopy: Was deferred    ASSESSMENT  Positive response to CXRT.     CEA: 2.0.    Interval imaging with PET-CT and MR pelvis on 18:    IMPRESSION:   In this patient with a history of anal cancer status post chemotherapy  and radiation:  1. Decrease in FDG uptake at the site of the primary anal mass. The  mass is poorly defined on the accompanying CT. This is consistent with  response to therapy.  2. No significant residual increased FDG uptake in the left inguinal  nodes.  3. Small focus of uptake adjacent to the urethra without an  accompanying lesion on the CT. This is most suspicious for a urethral  contamination, possibly within a urethral diverticulum or a small  amount of retained excreted urine tracking up into the vaginal fornix.  Recommend attention on follow-up imaging, although " alternatively a  repeat MRI of the pelvis may help increase confidence that this  finding does not represent small lesion.  4. Stable sub-5 mm indeterminate pulmonary nodules.  5. Improvement in trace amount of infiltrative free fluid in the  pelvis.  6. Decrease in now small right greater than left pleural effusions,  with resolution of the previously visualized right pneumothorax.  7. Incidental findings include chronic healing rib fractures,  asymmetric right paraspinal muscular uptake, cholelithiasis, and  dilated lower extremity superficial veins.  8. Redemonstration of the low-density cyst in the left adnexa likely  an ovarian cyst, without increased FDG uptake. Continued attention on  follow-up imaging is recommended.    IMPRESSION:   1. Interval near resolution of the patient's known left anal mass  lesion.  2. No evidence for recurrent or metastatic disease in the pelvis.  2a. Please also refer to same day PET/CT exam report.  3. Stable left ovarian cysts. Recommend monitoring at least on annual  basis (on routine anal cancer restaging follow-up or ultrasound pelvis  if at a later date restaging is no longer required).    PLAN  1. RTC in 2 months for repeat anorectal exam and anoscopy.  2. Rest of surveillance per medical oncology.    Time spent: 30 minutes.  >50% spent in discussing, counseling and coordinating care.    Emir Rosas M.D., M.Sc.     Division of Colon and Rectal Surgery  [facility]    Referring Provider:  Felisha Davis, CHAYITO  [address] [place] [address]     CC:  MD Zaire Mcleod MD

## 2018-05-30 ENCOUNTER — OFFICE VISIT (OUTPATIENT)
Dept: SURGERY | Facility: CLINIC | Age: 78
End: 2018-05-30
Payer: MEDICARE

## 2018-05-30 VITALS
DIASTOLIC BLOOD PRESSURE: 68 MMHG | HEIGHT: 65 IN | BODY MASS INDEX: 18.49 KG/M2 | TEMPERATURE: 98.1 F | SYSTOLIC BLOOD PRESSURE: 101 MMHG | OXYGEN SATURATION: 98 % | WEIGHT: 111 LBS | HEART RATE: 97 BPM

## 2018-05-30 DIAGNOSIS — C21.1 MALIGNANT NEOPLASM OF ANAL CANAL (H): Primary | ICD-10-CM

## 2018-05-30 RX ORDER — MENTHOL AND ZINC OXIDE .44; 20.625 G/100G; G/100G
OINTMENT TOPICAL
Qty: 1 TUBE | Refills: 3 | Status: SHIPPED | OUTPATIENT
Start: 2018-05-30 | End: 2018-06-27

## 2018-05-30 RX ORDER — FLUCONAZOLE 150 MG/1
150 TABLET ORAL DAILY
Qty: 5 TABLET | Refills: 0 | Status: SHIPPED | OUTPATIENT
Start: 2018-05-30 | End: 2019-02-07

## 2018-05-30 ASSESSMENT — PAIN SCALES - GENERAL: PAINLEVEL: SEVERE PAIN (7)

## 2018-05-30 NOTE — LETTER
"2018       RE: Elizabeth Taylor  [address] [facility] [address]     Dear Colleague,    Thank you for referring your patient, Elizabeth Taylor, to the [facility] COLON AND RECTAL SURGERY at [facility]. Please see a copy of my visit note below.    Colon and Rectal Surgery Follow-up Clinic Note      RE: Elizabeth Taylor  : 1940  SALBADOR: 2018     DIAGNOSIS: mT2N1- IIIA anal canal squamous cell carcinoma, (s/p CXRT - 5,400 cGy to anal canal, 5,040 to left groin and 4,500 cGy to pelvis and right groin given with concurrent Mitomycin-C and 5-FU).    INTERVAL HISTORY: Finalized CXRT on 3/23/18. Did not tolerate this well at all. Currently admitted at TCU. Was on TPN for approx 1 month but appetite has recently improved. Continues to have Joiner catheter given vulvar excoriation. Denies increased pain, fevers, or chills. Tolerating diet well. Having 2-3 BM's per day with significant fecal urgency and incontinence.     Physical Examination:  /68 (BP Location: Left arm, Patient Position: Sitting, Cuff Size: Adult Small)  Pulse 97  Temp 98.1  F (36.7  C) (Oral)  Ht 5' 5\"  Wt 111 lb  SpO2 98%  BMI 18.47 kg/m2  Abdomen soft, NT. No inguinal adenopathy palpated.  Perianal skin with mild excoriation and hyperpigmentation. Perivulvar excoriation/ulceration. No evidence of active infection.  BRITTNEY: did not tolerate because of pain. No external lesions.  Anoscopy: Was deferred    ASSESSMENT  Positive response to CXRT.     CEA: 2.0.    Interval imaging with PET-CT and MR pelvis on 18:    IMPRESSION:   In this patient with a history of anal cancer status post chemotherapy  and radiation:  1. Decrease in FDG uptake at the site of the primary anal mass. The  mass is poorly defined on the accompanying CT. This is consistent with  response to therapy.  2. No significant residual increased FDG uptake in the left inguinal  nodes.  3. Small focus of uptake " adjacent to the urethra without an  accompanying lesion on the CT. This is most suspicious for a urethral  contamination, possibly within a urethral diverticulum or a small  amount of retained excreted urine tracking up into the vaginal fornix.  Recommend attention on follow-up imaging, although alternatively a  repeat MRI of the pelvis may help increase confidence that this  finding does not represent small lesion.  4. Stable sub-5 mm indeterminate pulmonary nodules.  5. Improvement in trace amount of infiltrative free fluid in the  pelvis.  6. Decrease in now small right greater than left pleural effusions,  with resolution of the previously visualized right pneumothorax.  7. Incidental findings include chronic healing rib fractures,  asymmetric right paraspinal muscular uptake, cholelithiasis, and  dilated lower extremity superficial veins.  8. Redemonstration of the low-density cyst in the left adnexa likely  an ovarian cyst, without increased FDG uptake. Continued attention on  follow-up imaging is recommended.    IMPRESSION:   1. Interval near resolution of the patient's known left anal mass  lesion.  2. No evidence for recurrent or metastatic disease in the pelvis.  2a. Please also refer to same day PET/CT exam report.  3. Stable left ovarian cysts. Recommend monitoring at least on annual  basis (on routine anal cancer restaging follow-up or ultrasound pelvis  if at a later date restaging is no longer required).    PLAN  1. RTC in 2 months for repeat anorectal exam and anoscopy.  2. Rest of surveillance per medical oncology.    Time spent: 30 minutes.  >50% spent in discussing, counseling and coordinating care.    Emir Rosas M.D., M.Sc.     Division of Colon and Rectal Surgery  [facility]    Referring Provider:  Felisha Davis NP  [address] [place] [address]     CC:  MD Zaire Mcleod MD

## 2018-05-30 NOTE — MR AVS SNAPSHOT
After Visit Summary   5/30/2018    Elizabeth Taylor    MRN: [id number]           Patient Information     Date Of Birth          1940        Visit Information        Provider Department      5/30/2018 2:00 PM Emir Rosas MD [facility] Colon and Rectal Surgery        Today's Diagnoses     Malignant neoplasm of anal canal (H)    -  1       Follow-ups after your visit        Your next 10 appointments already scheduled     Jun 22, 2018 11:00 AM CDT   Masonic Lab Draw with  MASONIC LAB DRAW   [facility] Lab Draw ([facility])    [address] [place] [address]  [address]  [employer] [facility] [address]   [phone]            Jun 22, 2018 11:30 AM CDT   (Arrive by 11:15 AM)   Return Visit with Shen Ray MD   [facility] Cancer Clinic ([facility])    [address] [place] [address]  [address]  [employer] [facility] [address]   [phone]            Jun 27, 2018  9:00 AM CDT   Return Visit with Zaire Madison MD   Radiation Oncology Clinic ([facility])    [facility]  1st Floor  [address] [facility] [address]   [phone]            Jul 30, 2018 11:30 AM CDT   (Arrive by 11:15 AM)   Return Visit with Emir Rosas MD   [facility] Colon and Rectal Surgery ([facility])    [address] [place] [address]  4th Floor  [employer] [id number] [phone]              Who to contact     Please call your clinic at [phone] to:    Ask questions about your health    Make or cancel appointments    Discuss your medicines    Learn about your test results    Speak to your doctor            Additional Information About Your Visit        MyChart Information     [employer] gives you secure access to your electronic health record. If you see a primary care provider, you can also send messages to your care team and make appointments. If you have questions, please call your primary care  "clinic.  If you do not have a primary care provider, please call [phone] and they will assist you.      [employer] is an electronic gateway that provides easy, online access to your medical records. With [employer], you can request a clinic appointment, read your test results, renew a prescription or communicate with your care team.     To access your existing account, please contact your [facility] or call [phone] for assistance.        Care EveryWhere ID     This is your Care EveryWhere ID. This could be used by other organizations to access your [place] medical records  [id number]        Your Vitals Were     Pulse Temperature Height Pulse Oximetry BMI (Body Mass Index)       97 98.1  F (36.7  C) (Oral) 5' 5\" 98% 18.47 kg/m2        Blood Pressure from Last 3 Encounters:   05/30/18 101/68   05/18/18 101/59   05/02/18 113/62    Weight from Last 3 Encounters:   05/30/18 111 lb   05/25/18 111 lb 3.2 oz   05/18/18 107 lb 14.4 oz              We Performed the Following     ANOSCOPY W/WO BRUSH/WASH          Today's Medication Changes          These changes are accurate as of 5/30/18  3:34 PM.  If you have any questions, ask your nurse or doctor.               Start taking these medicines.        Dose/Directions    fluconazole 150 MG tablet   Commonly known as:  DIFLUCAN   Used for:  Malignant neoplasm of anal canal (H)   Started by:  Emir Rosas MD        Dose:  150 mg   Take 1 tablet (150 mg) by mouth daily for 5 days   Quantity:  5 tablet   Refills:  0       menthol-zinc oxide 0.44-20.625 % Oint ointment   Commonly known as:  CALMOSEPTINE   Used for:  Malignant neoplasm of anal canal (H)   Started by:  Emir Rosas MD        Apply thick layer to irritated perianal skin 4 times per day and after bowel movements.   Quantity:  1 Tube   Refills:  3            Where to get your medicines      These medications were sent to [facility] [place], [place] - " [address] [address]  [address] [address] [place] [address]     Phone:  [phone]     fluconazole 150 MG tablet    menthol-zinc oxide 0.44-20.625 % Oint ointment                Primary Care Provider Office Phone # Fax #    Felisha DavisCHAYITO [phone] [address] [place]  [facility] [address]        Equal Access to Services     IONA DALEY : Hadii aad ku hadasho Soomaali, waaxda luqadaha, qaybta kaalmada adeegyada, waxay idiin hayaan adeeg kharash la'elly . So [facility] [phone].    ATENCIÓN: Si habla español, tiene a ghosh disposición servicios gratuitos de asistencia lingüística. Llame al [phone].    We comply with applicable federal civil rights laws and [place] laws. We do not discriminate on the basis of race, color, national origin, age, disability, sex, sexual orientation, or gender identity.            Thank you!     Thank you for choosing [facility] COLON AND RECTAL SURGERY  for your care. Our goal is always to provide you with excellent care. Hearing back from our patients is one way we can continue to improve our services. Please take a few minutes to complete the written survey that you may receive in the mail after your visit with us. Thank you!             Your Updated Medication List - Protect others around you: Learn how to safely use, store and throw away your medicines at www.disposemymeds.org.          This list is accurate as of 5/30/18  3:34 PM.  Always use your most recent med list.                   Brand Name Dispense Instructions for use Diagnosis    acetaminophen 325 MG tablet    TYLENOL     Take 2 tablets (650 mg) by mouth every 4 hours as needed for pain    Closed fracture of one rib of right side, initial encounter       artificial saliva Liqd liquid      Swish and spit 5-10 mLs in mouth 4 times daily as needed for dry mouth    Mouth dryness       calcium-vitamin D 600-400 MG-UNIT per tablet    CALTRATE    60 tablet    Take 1 tablet by mouth daily     Age-related osteoporosis with current pathological fracture, initial encounter       diphenoxylate-atropine 2.5-0.025 MG per tablet    LOMOTIL    40 tablet    Take 1 tablet by mouth 4 times daily as needed for diarrhea . If having less than two bowel movements daily, DO NOT GIVE    Diarrhea, unspecified type, Malignant neoplasm of anal canal (H)       fluconazole 150 MG tablet    DIFLUCAN    5 tablet    Take 1 tablet (150 mg) by mouth daily for 5 days    Malignant neoplasm of anal canal (H)       loperamide 2 MG tablet    LOPERAMIDE A-D    60 tablet    Take 1 tablet (2 mg) by mouth 4 times daily as needed for diarrhea . If having less than two bowel movements daily, DO NOT GIVE    Malignant neoplasm of anal canal (H)       menthol-zinc oxide 0.44-20.625 % Oint ointment    CALMOSEPTINE    1 Tube    Apply thick layer to irritated perianal skin 4 times per day and after bowel movements.    Malignant neoplasm of anal canal (H)       methocarbamol 500 MG tablet    ROBAXIN    120 tablet    Take 1 tablet (500 mg) by mouth 4 times daily as needed for muscle spasms    Closed fracture of one rib of right side, initial encounter       multivitamin, therapeutic Tabs tablet      Take 1 tablet by mouth daily    Age-related osteoporosis with current pathological fracture, initial encounter       ondansetron 8 MG ODT tab    ZOFRAN-ODT    60 tablet    Take 1 tablet (8 mg) by mouth every 8 hours as needed For nausea    Malignant neoplasm of anal canal (H)       simethicone 40 MG/0.6ML suspension    MYLICON     Take 0.6 mLs (40 mg) by mouth every 6 hours as needed for cramping    Malignant neoplasm of anal canal (H), Flatulence, eructation and gas pain       traMADol 50 MG tablet    ULTRAM    20 tablet    Take 1 tablet (50 mg) by mouth every 6 hours    Closed fracture of one rib of right side, initial encounter       vitamin B complex with vitamin C Tabs tablet      Take 1 tablet by mouth daily

## 2018-05-31 ENCOUNTER — TELEPHONE (OUTPATIENT)
Dept: SURGERY | Facility: CLINIC | Age: 78
End: 2018-05-31

## 2018-05-31 NOTE — TELEPHONE ENCOUNTER
[facility] Call Center    Phone Message    May a detailed message be left on voicemail: yes    Reason for Call: Other: verbal order regarding what the two new medications are for     Action Taken: Message routed to:  [facility] colon rectal

## 2018-06-05 ENCOUNTER — PATIENT OUTREACH (OUTPATIENT)
Dept: CARE COORDINATION | Facility: CLINIC | Age: 78
End: 2018-06-05

## 2018-06-05 DIAGNOSIS — C21.1 MALIGNANT NEOPLASM OF ANAL CANAL (H): Primary | ICD-10-CM

## 2018-06-05 NOTE — PROGRESS NOTES
Clinic Care Coordination Contact  Care Coordination Transition Communication     Clinical Data: Patient was hospitalized at [facility] from 3/19 to 4/4/2018 with diagnosis of   Date of Admission:  3/19/2018  Date of Discharge:  4/4/2018  Discharging Provider: Brenda Gurrola  Date of Service (when I saw the patient): 04/04/18        Discharge Diagnoses      Malignant neoplasm of anal canal   Dehydration  Diarrhea, unspecified type  Flatulence, eructation and gas pain  Mouth dryness  Age-related osteoporosis with current pathological fracture, initial encounter  Fall, initial encounter  Closed fracture of three ribs of right side, initial encounter.     Transition to Facility:              Facility Name: [facility]              Contact name and phone number/fax: Otoniel SALAS RN .   [phone]     Plan: RN/SW Care Coordinator will await notification from facility staff informing RN/SW Care Coordinator of patient's discharge plans/needs. RN/SW Care Coordinator will review chart and outreach to facility staff every 4 weeks and as needed.     Quynh Mckeon RN / Clinical Care Coordinator     [facility]    [facility]   [email or web address]  Office :  [phone] / Fax :  [phone]

## 2018-06-11 ENCOUNTER — CARE COORDINATION (OUTPATIENT)
Dept: ONCOLOGY | Facility: CLINIC | Age: 78
End: 2018-06-11

## 2018-06-11 NOTE — PROGRESS NOTES
"Received call from pt's friend, Katherine, stating pt in TCU with planned discharge on 6/18 but still has Joiner cath in place.  Per Katherine, they will not remove Joiner without order for oncology.  Joiner place while pt in hospital.  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 She developed deconditioning and was ultimately discharged to TCU.\"    Per Katherine, friend, Katherine rad tx nurse left message for author about situation.  RN remembers something about situation but not finding note.  Apologized for not following up earlier.  Stated since hospitalized by rad therapy for perineal wound excoriation thought they would provide order for Joiner removal but author will check into situation further.  Katherine stated TCU care plan is for discharge on 6/18.  Also stated pt being seen at TCU by Dr. Patricia but that he will not order Joiner to be removed without ok from [place].    RN noted Dr. Rosas saw pt on 5/30 and wrote \"Continues to have Joiner catheter given vulvar excoriation....Having 2-3 BM's per day with significant fecal urgency and incontinence.\"  Left message for Tracey nurse manager, [facility] requesting copy of note from Dr. Patricia from last visit with pt, possibly on 6/7/18.  Also requested call back to discuss.  Per message from Verónica Luo RN care coordinator who works with Dr. Rosas's patients, \"I am Dr. Rosas's nurse care coordinator and I was in with this patient. We had Feng our wound ostomy nurse came in to assess the area. He said there was yeast growth in her folds so we started her one medications for these as well as prescribed her some ointment for the perineal area. These orders are reflected in Dr. Rosas's note. Let me know if you have any other questions. \"  Spoke with nurse adeline Webb this afternoon when she returned call.  She stated pt still have Joiner cath and they need order for " "oncology before they will pull Joiner.  She reports patient has 1 X 1.3 cm excoriated area in perineum.  When asked how close to urethra, Tracey could not quantify beyond \"close\".  She did stated this area is much smaller than it was previously as whole area including labia and groin areas were also excoriated.  She is thinking it will heal in another week or 2 based on how it has been improving.  Also stated that Dr. Herrera did not see any evidence of yeast infection when her examined pt on Thursday.  Per charting, pt having about 2 \"formed\" stools a day.  Tracey stated pt will likely identify this as having \"loose stools\" do to number.    No answer when RN tried to call pt's TCU phone number.    Per Dr. Ray, called Tracey, nurse manager at TCU to inform ok to remove Joiner catheter and have TCU primary manage further such as assessing for bladder emptying and if should   re-insert if urine retention of specific quantity.   Left message for Katherine stating gave Tracey order for removal and for primary there to manage further.  "

## 2018-06-20 ENCOUNTER — PATIENT OUTREACH (OUTPATIENT)
Dept: CARE COORDINATION | Facility: CLINIC | Age: 78
End: 2018-06-20

## 2018-06-20 DIAGNOSIS — C21.1 MALIGNANT NEOPLASM OF ANAL CANAL (H): Primary | ICD-10-CM

## 2018-06-20 DIAGNOSIS — R19.7 DIARRHEA: ICD-10-CM

## 2018-06-20 NOTE — LETTER
[facility] CARE COORDINATION    June 22, 2018    Elizabeth Yaquelin Claudia  [address] [address]      Dear Elizabeth,    I am a clinic care coordinator who works with Felisha Davis NP at [facility] . I have been trying to reach you recently to introduce Clinic Care Coordination and to see if there was anything I could assist you with.  I wanted to introduce myself and provide you with my contact information so that you can call me with questions or concerns about your health care. Below is a description of clinic care coordination and how I can further assist you.     The clinic care coordinator is a registered nurse  who understand the health care system. The goal of clinic care coordination is to help you manage your health and improve access to the [facility] system in the most efficient manner. The registered nurse can assist you in meeting your health care goals by providing education, coordinating services, and strengthening the communication among your providers.   Please feel free to contact me at [phone], with any questions or concerns. We at [facility] are focused on providing you with the highest-quality healthcare experience possible and that all starts with you.     Sincerely,     Quynh Mckeon RN / Clinical Care Coordinator     [facility]   [email or web address]  Office :  [phone] / Fax :  [phone]      Enclosed: I have enclosed a copy of a 24 Hour Access Plan. This has helpful phone numbers for you to call when needed. Please keep this in an easy to access place to use as needed.

## 2018-06-20 NOTE — LETTER
Health Care Home - Access Care Plan    About Me  Patient Name:  Elizabeth Taylor    YOB: 1940  Age:                             77 year old   Dru MRN:            [id number] Telephone Information:     Home Phone [phone]   Mobile [phone]       Address:    [address] [place] [address] Email address:  [email or web address]      Emergency Contact(s)  Name Relationship Lgl Grd Work Phone Home Phone Mobile Phone   1. DIANANEACE NAIR Friend    [phone]   2. AMARJIT CHAMBERS Sister   [phone]    3. DILLON STEWART  Friend   [phone]              Health Maintenance:      My Access Plan  Medical Emergency 911   Questions or concerns during clinic hours Primary Clinic Line, I will call the clinic directly: [facility] [phone]   24 Hour Appointment Line [phone] or  [phone]) (toll free)   24 Hour Nurse Line [phone] (toll free)   Questions or concerns outside clinic hours 24 Hour Appointment Line, I will call the after-hours on-call line:   [facility] [phone] or [phone]) (toll-free)   Preferred Urgent Care     Preferred Hospital     Preferred Pharmacy [facility] [place], [place] - [address] Behavioral Health Crisis Line The National Suicide Prevention Lifeline at 1-368.790.7308 or 915     My Care Team Members  Patient Care Team       Relationship Specialty Notifications Start End    Felisha Davis, NP PCP - General Family Practice  3/17/18     Phone: [phone] Fax: [phone]         [address] [facility] [address]    Shen Ray MD MD Hematology & Oncology Admissions 2/2/18     Phone: [phone] Fax: [phone]         [address] [place] [address]    Pauline Aguayo RN Nurse Coordinator Hematology & Oncology Admissions 2/12/18     Phone: [phone] Pager: [phone]        Zaire Madison MD MD Radiation  Oncology  4/17/18     Phone: [phone] Fax: [phone] [address] [address]    Quynh Mckeon, RN Clinic Care Coordinator Primary Care - CC  5/31/18     Phone: [phone] Fax: [phone]               My Medical and Care Information  Problem List   Patient Active Problem List   Diagnosis     CARDIOVASCULAR SCREENING; LDL GOAL LESS THAN 160     Dry eye syndrome     Malignant neoplasm of anal canal (H)     Diarrhea      Current Medications and Allergies:  See printed Medication Report

## 2018-06-21 NOTE — PROGRESS NOTES
Clinic Care Coordination Contact  [facility]/Voicemail       Clinical Data: Care Coordinator Outreach  [facility] admission 3/19-4/4/2018-  Malignant neoplasm of anal canal   Dehydration  Diarrhea, unspecified type  Flatulence, eructation and gas pain  Mouth dryness  Age-related osteoporosis with current pathological fracture, initial encounter  Fall, initial encounter  Closed fracture of three ribs of right side, initial encounter    Outreach attempted x 1.  Left message on voicemail with call back information and requested return call.  Plan: Care Coordinator will try to reach patient again in 1-2 business days.  Quynh Mckeon RN / Clinical Care Coordinator     [facility]   [email or web address]  Office :  [phone] / Fax :  [phone]      
Clinic Care Coordination Contact  [facility]/Voicemail       Clinical Data: Care Coordinator Outreach  Outreach attempted x 2.  Left message on voicemail with call back information and requested return call.  Plan:. Care Coordinator will await a return call     Quynh Mckeon RN / Clinical Care Coordinator     [facility]   [email or web address]  Office :  [phone] / Fax :  [phone]    
14.9

## 2018-06-22 ENCOUNTER — ONCOLOGY VISIT (OUTPATIENT)
Dept: ONCOLOGY | Facility: CLINIC | Age: 78
End: 2018-06-22
Attending: INTERNAL MEDICINE
Payer: MEDICARE

## 2018-06-22 ENCOUNTER — APPOINTMENT (OUTPATIENT)
Dept: LAB | Facility: CLINIC | Age: 78
End: 2018-06-22
Attending: INTERNAL MEDICINE
Payer: MEDICARE

## 2018-06-22 VITALS
WEIGHT: 120.6 LBS | SYSTOLIC BLOOD PRESSURE: 115 MMHG | HEART RATE: 91 BPM | OXYGEN SATURATION: 99 % | TEMPERATURE: 97.8 F | DIASTOLIC BLOOD PRESSURE: 75 MMHG | RESPIRATION RATE: 20 BRPM | BODY MASS INDEX: 20.09 KG/M2 | HEIGHT: 65 IN

## 2018-06-22 DIAGNOSIS — C21.1 MALIGNANT NEOPLASM OF ANAL CANAL (H): Primary | ICD-10-CM

## 2018-06-22 DIAGNOSIS — S22.31XA CLOSED FRACTURE OF ONE RIB OF RIGHT SIDE, INITIAL ENCOUNTER: ICD-10-CM

## 2018-06-22 LAB
ALBUMIN SERPL-MCNC: 2.8 G/DL (ref 3.4–5)
ALP SERPL-CCNC: 131 U/L (ref 40–150)
ALT SERPL W P-5'-P-CCNC: 20 U/L (ref 0–50)
ANION GAP SERPL CALCULATED.3IONS-SCNC: 7 MMOL/L (ref 3–14)
AST SERPL W P-5'-P-CCNC: 21 U/L (ref 0–45)
BILIRUB SERPL-MCNC: 0.6 MG/DL (ref 0.2–1.3)
BUN SERPL-MCNC: 15 MG/DL (ref 7–30)
CALCIUM SERPL-MCNC: 8.6 MG/DL (ref 8.5–10.1)
CHLORIDE SERPL-SCNC: 107 MMOL/L (ref 94–109)
CO2 SERPL-SCNC: 28 MMOL/L (ref 20–32)
CREAT SERPL-MCNC: 0.6 MG/DL (ref 0.52–1.04)
ERYTHROCYTE [DISTWIDTH] IN BLOOD BY AUTOMATED COUNT: 13.9 % (ref 10–15)
GFR SERPL CREATININE-BSD FRML MDRD: >90 ML/MIN/1.7M2
GLUCOSE SERPL-MCNC: 79 MG/DL (ref 70–99)
HCT VFR BLD AUTO: 34.2 % (ref 35–47)
HGB BLD-MCNC: 10.8 G/DL (ref 11.7–15.7)
MCH RBC QN AUTO: 30.4 PG (ref 26.5–33)
MCHC RBC AUTO-ENTMCNC: 31.6 G/DL (ref 31.5–36.5)
MCV RBC AUTO: 96 FL (ref 78–100)
PLATELET # BLD AUTO: 255 10E9/L (ref 150–450)
POTASSIUM SERPL-SCNC: 4.2 MMOL/L (ref 3.4–5.3)
PROT SERPL-MCNC: 6.8 G/DL (ref 6.8–8.8)
RBC # BLD AUTO: 3.55 10E12/L (ref 3.8–5.2)
SODIUM SERPL-SCNC: 142 MMOL/L (ref 133–144)
WBC # BLD AUTO: 6.1 10E9/L (ref 4–11)

## 2018-06-22 PROCEDURE — 99214 OFFICE O/P EST MOD 30 MIN: CPT | Mod: ZP | Performed by: INTERNAL MEDICINE

## 2018-06-22 PROCEDURE — G0463 HOSPITAL OUTPT CLINIC VISIT: HCPCS | Mod: ZF

## 2018-06-22 PROCEDURE — 80053 COMPREHEN METABOLIC PANEL: CPT | Performed by: INTERNAL MEDICINE

## 2018-06-22 PROCEDURE — 36591 DRAW BLOOD OFF VENOUS DEVICE: CPT

## 2018-06-22 PROCEDURE — 85027 COMPLETE CBC AUTOMATED: CPT | Performed by: INTERNAL MEDICINE

## 2018-06-22 PROCEDURE — 25000128 H RX IP 250 OP 636: Mod: ZF | Performed by: INTERNAL MEDICINE

## 2018-06-22 RX ORDER — METHOCARBAMOL 500 MG/1
500 TABLET, FILM COATED ORAL 2 TIMES DAILY PRN
Qty: 120 TABLET | Refills: 0 | Status: SHIPPED | OUTPATIENT
Start: 2018-06-22 | End: 2018-09-21

## 2018-06-22 RX ORDER — HEPARIN SODIUM (PORCINE) LOCK FLUSH IV SOLN 100 UNIT/ML 100 UNIT/ML
5 SOLUTION INTRAVENOUS EVERY 8 HOURS
Status: DISCONTINUED | OUTPATIENT
Start: 2018-06-22 | End: 2018-06-30 | Stop reason: HOSPADM

## 2018-06-22 RX ADMIN — SODIUM CHLORIDE, PRESERVATIVE FREE 5 ML: 5 INJECTION INTRAVENOUS at 11:34

## 2018-06-22 ASSESSMENT — PAIN SCALES - GENERAL: PAINLEVEL: MODERATE PAIN (4)

## 2018-06-22 NOTE — MR AVS SNAPSHOT
After Visit Summary   6/22/2018    Elizabeth Taylor    MRN: [id number]           Patient Information     Date Of Birth          1940        Visit Information        Provider Department      6/22/2018 11:30 AM Shen Ray MD [facility]        Today's Diagnoses     Malignant neoplasm of anal canal (H)    -  1    Closed fracture of one rib of right side, initial encounter           Follow-ups after your visit        Follow-up notes from your care team     Return in about 2 months (around 8/22/2018) for Lab Work, Routine Visit.      Your next 10 appointments already scheduled     Jul 30, 2018 11:30 AM CDT   (Arrive by 11:15 AM)   Return Visit with Emir Rosas MD   [facility] ([facility])    [address] [place] [address]  4th Floor  [facility] [id number] [phone]            Aug 31, 2018 11:00 AM CDT   [facility] Draw with  [facility] D[facility]   [facility] Lab Draw ([facility])    [address] [place] [address]  [address]  [facility] [id number] [phone]            Aug 31, 2018 11:30 AM CDT   (Arrive by 11:15 AM)   Return Visit with Shen Ray MD   [facility] ([facility])    [address] [place] [address]  [address] [place] [id number] [phone]              Who to contact     If you have questions or need follow up information about today's clinic visit or your schedule please contact [facility] directly at [phone].  Normal or non-critical lab and imaging results will be communicated to you by MyChart, letter or phone within 4 business days after the clinic has received the results. If you do not hear from us within 7 days, please contact the clinic through MyChart or phone. If you have a critical or abnormal lab result, we will notify you by phone as soon as possible.  Submit refill requests through [employer] or  "call your pharmacy and they will forward the refill request to us. Please allow 3 business days for your refill to be completed.          Additional Information About Your Visit        MyChart Information     [employer] gives you secure access to your electronic health record. If you see a primary care provider, you can also send messages to your care team and make appointments. If you have questions, please call your primary care clinic.  If you do not have a primary care provider, please call [phone] and they will assist you.        Care EveryWhere ID     This is your Care EveryWhere ID. This could be used by other organizations to access your [facility] medical records  [id number]        Your Vitals Were     Pulse Temperature Respirations Height Pulse Oximetry BMI (Body Mass Index)    91 97.8  F (36.6  C) (Oral) 20 1.651 m (5' 5\") 99% 20.07 kg/m2       Blood Pressure from Last 3 Encounters:   No data found for BP    Weight from Last 3 Encounters:   No data found for Wt              Today, you had the following     No orders found for display         Today's Medication Changes          These changes are accurate as of 6/22/18 11:59 PM.  If you have any questions, ask your nurse or doctor.               These medicines have changed or have updated prescriptions.        Dose/Directions    methocarbamol 500 MG tablet   Commonly known as:  ROBAXIN   This may have changed:  when to take this   Used for:  Closed fracture of one rib of right side, initial encounter   Changed by:  Shen Ray MD        Dose:  500 mg   Take 1 tablet (500 mg) by mouth 2 times daily as needed for muscle spasms   Quantity:  120 tablet   Refills:  0            Where to get your medicines      These medications were sent to [facility], [place] - [address] [facility] [address]  [address] [place] [address] [address] [address]    Hours:  TRANSPLANT PHONE NUMBER [phone] Phone:  [phone]     methocarbamol " 500 MG tablet                Primary Care Provider Office Phone # Fax #    Felisha Davis, [phone] [address] [place]IRA FELICIANO  [facility] [address]        Equal Access to Services     IONA DALEY : Hadii aad ku hadrayo Sotheodoreali, waaxda luqadaha, qaybta kaalmada adeegyada, cyndy colladojarett ahujaargentina real isabelle catalan. So [facility] [phone].    ATENCIÓN: Si habla español, tiene a ghosh disposición servicios gratuitos de asistencia lingüística. Llame al [phone].    We comply with applicable federal civil rights laws and [place] laws. We do not discriminate on the basis of race, color, national origin, age, disability, sex, sexual orientation, or gender identity.            Thank you!     Thank you for choosing [facility]  for your care. Our goal is always to provide you with excellent care. Hearing back from our patients is one way we can continue to improve our services. Please take a few minutes to complete the written survey that you may receive in the mail after your visit with us. Thank you!             Your Updated Medication List - Protect others around you: Learn how to safely use, store and throw away your medicines at www.disposemymeds.org.          This list is accurate as of 6/22/18 11:59 PM.  Always use your most recent med list.                   Brand Name Dispense Instructions for use Diagnosis    acetaminophen 325 MG tablet    TYLENOL     Take 2 tablets (650 mg) by mouth every 4 hours as needed for pain    Closed fracture of one rib of right side, initial encounter       artificial saliva Liqd liquid      Swish and spit 5-10 mLs in mouth 4 times daily as needed for dry mouth    Mouth dryness       calcium-vitamin D 600-400 MG-UNIT per tablet    CALTRATE    60 tablet    Take 1 tablet by mouth daily    Age-related osteoporosis with current pathological fracture, initial encounter       diphenoxylate-atropine 2.5-0.025 MG per tablet    LOMOTIL    40 tablet    Take  1 tablet by mouth 4 times daily as needed for diarrhea . If having less than two bowel movements daily, DO NOT GIVE    Diarrhea, unspecified type, Malignant neoplasm of anal canal (H)       loperamide 2 MG tablet    LOPERAMIDE A-D    60 tablet    Take 1 tablet (2 mg) by mouth 4 times daily as needed for diarrhea . If having less than two bowel movements daily, DO NOT GIVE    Malignant neoplasm of anal canal (H)       methocarbamol 500 MG tablet    ROBAXIN    120 tablet    Take 1 tablet (500 mg) by mouth 2 times daily as needed for muscle spasms    Closed fracture of one rib of right side, initial encounter       multivitamin, therapeutic Tabs tablet      Take 1 tablet by mouth daily    Age-related osteoporosis with current pathological fracture, initial encounter       ondansetron 8 MG ODT tab    ZOFRAN-ODT    60 tablet    Take 1 tablet (8 mg) by mouth every 8 hours as needed For nausea    Malignant neoplasm of anal canal (H)       simethicone 40 MG/0.6ML suspension    MYLICON     Take 0.6 mLs (40 mg) by mouth every 6 hours as needed for cramping    Malignant neoplasm of anal canal (H), Flatulence, eructation and gas pain       traMADol 50 MG tablet    ULTRAM    20 tablet    Take 1 tablet (50 mg) by mouth every 6 hours    Closed fracture of one rib of right side, initial encounter       vitamin B complex with vitamin C Tabs tablet      Take 1 tablet by mouth daily

## 2018-06-22 NOTE — PROGRESS NOTES
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 She underwent a colonoscopy in April 2017 which noted to have small anal fissure along with internal hemorrhoids. Symptoms continued and  she was referred to colorectal surgery for further evaluation. On exam she was found to have an 1.5 cm anal lesion on the left side along with left groin palpable adenopathy. Biopsy of anal mass came back positive for squamous cell carcinoma. Patient underwent staging workup with MRI pelvis and a PET scan- showed PET avid left anal canal mass along with small left inguinal FDG avid lymph nodes.     02/12/18- Started on concurrent chemoradiation with 5FU/Mitomycin    03/13/18 Skipped Mitomycin and proceeded with 5FU at reduced dose given neutropenia/old age    03/19/18 -04/04/18 admitted to the [facility] with intractable diarrhea, nausea, generalized weakness and fall at home resulting in multiple right rib fractures. Hospital course was complicated by small bowel obstruction for which patient was started on TPN. Bowel obstruction was managed conservatively eventually improved prior to discharge . 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 She was eventually discharged to transitional care unit.    05/25/18 05/25/18 MRI pelvis and PET scan done showed near complete resolution of patient's known anal lesion with no evidence for recurrence or metastatic disease.    SUBJECTIVE      She is in clinic today for a 4week follow-up.   She was discharged to her sister's home from  rehabilitation facility 2 days ago. Continues to do well overall. Good appetite and improving energy levels. Intermittent diarrhea. A Joiner catheter was removed few days back and has noted some excoriations in the vulvar area which is managed by home care nurse.      PAST MEDICAL HISTORY     Past Medical History:   Diagnosis Date     Anal cancer (H)      Endometriosis, site unspecified      Thyroiditis, unspecified     Thryoiditis-resolved         CURRENT OUTPATIENT MEDICATIONS     Current Outpatient Prescriptions   Medication Sig Dispense Refill     acetaminophen (TYLENOL) 325 MG tablet Take 2 tablets (650 mg) by mouth every 4 hours as needed for pain       menthol-zinc oxide (CALMOSEPTINE) 0.44-20.625 % OINT ointment Apply thick layer to irritated perianal skin 4 times per day and after bowel movements. 1 Tube 3     methocarbamol (ROBAXIN) 500 MG tablet Take 1 tablet (500 mg) by mouth 2 times daily as needed for muscle spasms 120 tablet 0     multivitamin, therapeutic (THERA-VIT) TABS tablet Take 1 tablet by mouth daily  0     traMADol (ULTRAM) 50 MG tablet Take 1 tablet (50 mg) by mouth every 6 hours 20 tablet 0     vitamin B complex with vitamin C (VITAMIN  B COMPLEX) TABS tablet Take 1 tablet by mouth daily       artificial saliva (BIOTENE DRY MOUTHWASH) LIQD liquid Swish and spit 5-10 mLs in mouth 4 times daily as needed for dry mouth (Patient not taking: Reported on 6/22/2018)       calcium-vitamin D (CALTRATE) 600-400 MG-UNIT per tablet Take 1 tablet by mouth daily (Patient not taking: Reported on 6/22/2018) 60 tablet      diphenoxylate-atropine (LOMOTIL) 2.5-0.025 MG per tablet Take 1 tablet by mouth 4 times daily as needed for diarrhea . If having less than two bowel movements daily, DO NOT GIVE (Patient not taking: Reported on 6/22/2018) 40 tablet 0     loperamide (LOPERAMIDE A-D) 2 MG tablet Take 1 tablet (2 mg) by mouth 4 times daily as needed for diarrhea . If having less than two bowel movements  daily, DO NOT GIVE (Patient not taking: Reported on 6/22/2018) 60 tablet 3     ondansetron (ZOFRAN-ODT) 8 MG ODT tab Take 1 tablet (8 mg) by mouth every 8 hours as needed For nausea (Patient not taking: Reported on 6/22/2018) 60 tablet      simethicone (MYLICON) 40 MG/0.6ML suspension Take 0.6 mLs (40 mg) by mouth every 6 hours as needed for cramping (Patient not taking: Reported on 6/22/2018)          ALLERGIES     Allergies   Allergen Reactions     Darvon [Propoxyphene]      Had reaction in teen years     Penicillins      As a child     Ketoconazole Rash        REVIEW OF SYSTEMS   As above in the HPI, o/w complete 12-point ROS was negative.     PHYSICAL EXAM   B/P: 131/82, T: 98.5, P: 78, R: 16  SpO2 Readings from Last 4 Encounters:   06/22/18 99%   05/30/18 98%   05/18/18 97%   05/02/18 95%     Wt Readings from Last 3 Encounters:   06/22/18 54.7 kg (120 lb 9.6 oz)   05/30/18 50.3 kg (111 lb)   05/25/18 50.4 kg (111 lb 3.2 oz)     GEN: alert. No distress  Mouth/ENT: Oropharynx is clear.   LUNGS: clear  CV: regular  ABDOMEN: soft, non-tender, non-distended  EXT: warm, well perfused  NEURO: alert     LABORATORY AND IMAGING STUDIES     Lab Results   Component Value Date    WBC 6.1 06/22/2018     Lab Results   Component Value Date    RBC 3.55 06/22/2018     Lab Results   Component Value Date    HGB 10.8 06/22/2018     Lab Results   Component Value Date    HCT 34.2 06/22/2018     No components found for: MCT  Lab Results   Component Value Date    MCV 96 06/22/2018     Lab Results   Component Value Date    MCH 30.4 06/22/2018     Lab Results   Component Value Date    MCHC 31.6 06/22/2018     Lab Results   Component Value Date    RDW 13.9 06/22/2018     Lab Results   Component Value Date     06/22/2018     Recent Labs   Lab Test  06/22/18   1134  05/18/18   1226   NA  142  137   POTASSIUM  4.2  4.2   CHLORIDE  107  103   CO2  28  27   ANIONGAP  7  7   GLC  79  91   BUN  15  16   CR  0.60  0.53   ELISE  8.6  8.5      IMPRESSION:   In this patient with a history of anal cancer status post chemotherapy  and radiation:  1. Decrease in FDG uptake at the site of the primary anal mass. The  mass is poorly defined on the accompanying CT. This is consistent with  response to therapy.  2. No significant residual increased FDG uptake in the left inguinal  nodes.  3. Small focus of uptake adjacent to the urethra without an  accompanying lesion on the CT. This is most suspicious for a urethral  contamination, possibly within a urethral diverticulum or a small  amount of retained excreted urine tracking up into the vaginal fornix.  Recommend attention on follow-up imaging, although alternatively a  repeat MRI of the pelvis may help increase confidence that this  finding does not represent small lesion.  4. Stable sub-5 mm indeterminate pulmonary nodules.  5. Improvement in trace amount of infiltrative free fluid in the  pelvis.  6. Decrease in now small right greater than left pleural effusions,  with resolution of the previously visualized right pneumothorax.  7. Incidental findings include chronic healing rib fractures,  asymmetric right paraspinal muscular uptake, cholelithiasis, and  dilated lower extremity superficial veins.  8. Redemonstration of the low-density cyst in the left adnexa likely  an ovarian cyst, without increased FDG uptake. Continued attention on  follow-up imaging is recommended.       IMPRESSION:   1. Interval near resolution of the patient's known left anal mass  lesion.  2. No evidence for recurrent or metastatic disease in the pelvis.  2a. Please also refer to same day PET/CT exam report.  3. Stable left ovarian cysts. Recommend monitoring at least on annual  basis (on routine anal cancer restaging follow-up or ultrasound pelvis  if at a later date restaging is no longer required).      ASSESSMENT AND PLAN     77-year-old female with no significant past medical history who presented with complaints of bright  red blood per rectum and was recently found to have an anal mass which on biopsy came back for positive for invasive squamous cell carcinoma. Staging workup showed a PET avid anal mass along with hypermetabolic left inguinal adenopathy.     - Squamous cell carcinoma Anal canal  T2N1- IIIA  Started on definitive chemoradiation with infusional FU 1000 mg/m2 on days 1 to 4 and 29 to 32, plus mitomycin 10 mg/m2 on days 1 and 29 (as tolerated), maximum 20 mg per dose- day 1 on 02/12/18  Day 29 03/13/18 Skipped mitomycin and proceeded with 5FU at reduced dose given neutropenia/old age.  05/25/18 MRI pelvis and PET scan done showed near complete resolution of patient's known anal lesion with no evidence for recurrence or metastatic disease.  Discharge from rehabilitation facility 2 days ago and continues to do well overall.  NCCN guidelines were reviewed  Discussed in detail today about surveillance which would require distal rectal exam with inguinal lymph node palpation every 3-6 months for 5 years along with Anoscopy every 6-12 months for 3 years. Patient will also require a annual CT chest abdomen and pelvis with contrast for 3 years.   She currently scheduled with surgery in 2 months for anoscopy    - Malnutrition  Oral intake has significantly  improved.   RTC in 8 weeks with me. She was advised to return sooner prn.   Chart documentation with Dragon Voice recognition Software. Although reviewed after completion, some words and grammatical errors may remain.  Shen Ray MD  Attending Physician   Hematology/Medical Oncology
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
## 2018-06-22 NOTE — NURSING NOTE
Chief Complaint   Patient presents with     Port Draw     Port labs collected by RN. No labs orders, JIC purple and green collected.

## 2018-06-22 NOTE — LETTER
6/22/2018       RE: Elizabeth Taylor  [address]  [facility] [address]     Dear Colleague,    Thank you for referring your patient, Elizabeth Taylor, to the [facility]. Please see a copy of my visit note below.      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 She underwent a colonoscopy in April 2017 which noted to have small anal fissure along with internal hemorrhoids. Symptoms continued and  she was referred to colorectal surgery for further evaluation. On exam she was found to have an 1.5 cm anal lesion on the left side along with left groin palpable adenopathy. Biopsy of anal mass came back positive for squamous cell carcinoma. Patient underwent staging workup with MRI pelvis and a PET scan- showed PET avid left anal canal mass along with small left inguinal FDG avid lymph nodes.     02/12/18- Started on concurrent chemoradiation with 5FU/Mitomycin    03/13/18 Skipped Mitomycin and proceeded with 5FU at reduced dose given neutropenia/old age    03/19/18 -04/04/18 admitted to the [facility] with intractable diarrhea, nausea, generalized weakness and fall at home resulting in multiple right rib fractures. Hospital course was complicated by small bowel obstruction for which patient was started on TPN. Bowel obstruction was managed conservatively eventually improved prior to discharge . 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 She was eventually discharged to transitional care unit.    05/25/18 05/25/18 MRI pelvis and  PET scan done showed near complete resolution of patient's known anal lesion with no evidence for recurrence or metastatic disease.    SUBJECTIVE      She is in clinic today for a 4week follow-up.   She was discharged to her sister's home from rehabilitation facility 2 days ago. Continues to do well overall. Good appetite and improving energy levels. Intermittent diarrhea. A Joiner catheter was removed few days back and has noted some excoriations in the vulvar area which is managed by home care nurse.      PAST MEDICAL HISTORY     Past Medical History:   Diagnosis Date     Anal cancer (H)      Endometriosis, site unspecified      Thyroiditis, unspecified     Thryoiditis-resolved         CURRENT OUTPATIENT MEDICATIONS     Current Outpatient Prescriptions   Medication Sig Dispense Refill     acetaminophen (TYLENOL) 325 MG tablet Take 2 tablets (650 mg) by mouth every 4 hours as needed for pain       menthol-zinc oxide (CALMOSEPTINE) 0.44-20.625 % OINT ointment Apply thick layer to irritated perianal skin 4 times per day and after bowel movements. 1 Tube 3     methocarbamol (ROBAXIN) 500 MG tablet Take 1 tablet (500 mg) by mouth 2 times daily as needed for muscle spasms 120 tablet 0     multivitamin, therapeutic (THERA-VIT) TABS tablet Take 1 tablet by mouth daily  0     traMADol (ULTRAM) 50 MG tablet Take 1 tablet (50 mg) by mouth every 6 hours 20 tablet 0     vitamin B complex with vitamin C (VITAMIN  B COMPLEX) TABS tablet Take 1 tablet by mouth daily       artificial saliva (BIOTENE DRY MOUTHWASH) LIQD liquid Swish and spit 5-10 mLs in mouth 4 times daily as needed for dry mouth (Patient not taking: Reported on 6/22/2018)       calcium-vitamin D (CALTRATE) 600-400 MG-UNIT per tablet Take 1 tablet by mouth daily (Patient not taking: Reported on 6/22/2018) 60 tablet      diphenoxylate-atropine (LOMOTIL) 2.5-0.025 MG per tablet Take 1 tablet by mouth 4 times daily as needed for diarrhea . If having less than two bowel  movements daily, DO NOT GIVE (Patient not taking: Reported on 6/22/2018) 40 tablet 0     loperamide (LOPERAMIDE A-D) 2 MG tablet Take 1 tablet (2 mg) by mouth 4 times daily as needed for diarrhea . If having less than two bowel movements daily, DO NOT GIVE (Patient not taking: Reported on 6/22/2018) 60 tablet 3     ondansetron (ZOFRAN-ODT) 8 MG ODT tab Take 1 tablet (8 mg) by mouth every 8 hours as needed For nausea (Patient not taking: Reported on 6/22/2018) 60 tablet      simethicone (MYLICON) 40 MG/0.6ML suspension Take 0.6 mLs (40 mg) by mouth every 6 hours as needed for cramping (Patient not taking: Reported on 6/22/2018)          ALLERGIES     Allergies   Allergen Reactions     Darvon [Propoxyphene]      Had reaction in teen years     Penicillins      As a child     Ketoconazole Rash        REVIEW OF SYSTEMS   As above in the HPI, o/w complete 12-point ROS was negative.     PHYSICAL EXAM   B/P: 131/82, T: 98.5, P: 78, R: 16  SpO2 Readings from Last 4 Encounters:   06/22/18 99%   05/30/18 98%   05/18/18 97%   05/02/18 95%     Wt Readings from Last 3 Encounters:   06/22/18 54.7 kg (120 lb 9.6 oz)   05/30/18 50.3 kg (111 lb)   05/25/18 50.4 kg (111 lb 3.2 oz)     GEN: alert. No distress  Mouth/ENT: Oropharynx is clear.   LUNGS: clear  CV: regular  ABDOMEN: soft, non-tender, non-distended  EXT: warm, well perfused  NEURO: alert     LABORATORY AND IMAGING STUDIES     Lab Results   Component Value Date    WBC 6.1 06/22/2018     Lab Results   Component Value Date    RBC 3.55 06/22/2018     Lab Results   Component Value Date    HGB 10.8 06/22/2018     Lab Results   Component Value Date    HCT 34.2 06/22/2018     No components found for: MCT  Lab Results   Component Value Date    MCV 96 06/22/2018     Lab Results   Component Value Date    MCH 30.4 06/22/2018     Lab Results   Component Value Date    MCHC 31.6 06/22/2018     Lab Results   Component Value Date    RDW 13.9 06/22/2018     Lab Results   Component Value Date      06/22/2018     Recent Labs   Lab Test  06/22/18   1134  05/18/18   1226   NA  142  137   POTASSIUM  4.2  4.2   CHLORIDE  107  103   CO2  28  27   ANIONGAP  7  7   GLC  79  91   BUN  15  16   CR  0.60  0.53   ELISE  8.6  8.5     IMPRESSION:   In this patient with a history of anal cancer status post chemotherapy  and radiation:  1. Decrease in FDG uptake at the site of the primary anal mass. The  mass is poorly defined on the accompanying CT. This is consistent with  response to therapy.  2. No significant residual increased FDG uptake in the left inguinal  nodes.  3. Small focus of uptake adjacent to the urethra without an  accompanying lesion on the CT. This is most suspicious for a urethral  contamination, possibly within a urethral diverticulum or a small  amount of retained excreted urine tracking up into the vaginal fornix.  Recommend attention on follow-up imaging, although alternatively a  repeat MRI of the pelvis may help increase confidence that this  finding does not represent small lesion.  4. Stable sub-5 mm indeterminate pulmonary nodules.  5. Improvement in trace amount of infiltrative free fluid in the  pelvis.  6. Decrease in now small right greater than left pleural effusions,  with resolution of the previously visualized right pneumothorax.  7. Incidental findings include chronic healing rib fractures,  asymmetric right paraspinal muscular uptake, cholelithiasis, and  dilated lower extremity superficial veins.  8. Redemonstration of the low-density cyst in the left adnexa likely  an ovarian cyst, without increased FDG uptake. Continued attention on  follow-up imaging is recommended.       IMPRESSION:   1. Interval near resolution of the patient's known left anal mass  lesion.  2. No evidence for recurrent or metastatic disease in the pelvis.  2a. Please also refer to same day PET/CT exam report.  3. Stable left ovarian cysts. Recommend monitoring at least on annual  basis (on routine anal  cancer restaging follow-up or ultrasound pelvis  if at a later date restaging is no longer required).      ASSESSMENT AND PLAN     77-year-old female with no significant past medical history who presented with complaints of bright red blood per rectum and was recently found to have an anal mass which on biopsy came back for positive for invasive squamous cell carcinoma. Staging workup showed a PET avid anal mass along with hypermetabolic left inguinal adenopathy.     - Squamous cell carcinoma Anal canal  T2N1- IIIA  Started on definitive chemoradiation with infusional FU 1000 mg/m2 on days 1 to 4 and 29 to 32, plus mitomycin 10 mg/m2 on days 1 and 29 (as tolerated), maximum 20 mg per dose- day 1 on 02/12/18  Day 29 03/13/18 Skipped mitomycin and proceeded with 5FU at reduced dose given neutropenia/old age.  05/25/18 MRI pelvis and PET scan done showed near complete resolution of patient's known anal lesion with no evidence for recurrence or metastatic disease.  Discharge from rehabilitation facility 2 days ago and continues to do well overall.  NCCN guidelines were reviewed  Discussed in detail today about surveillance which would require distal rectal exam with inguinal lymph node palpation every 3-6 months for 5 years along with Anoscopy every 6-12 months for 3 years. Patient will also require a annual CT chest abdomen and pelvis with contrast for 3 years.   She currently scheduled with surgery in 2 months for anoscopy    - Malnutrition  Oral intake has significantly  improved.   RTC in 8 weeks with me. She was advised to return sooner prn.   Chart documentation with Dragon Voice recognition Software. Although reviewed after completion, some words and grammatical errors may remain.  Shen Ray MD  Attending Physician   Hematology/Medical Oncology

## 2018-06-25 ENCOUNTER — TELEPHONE (OUTPATIENT)
Dept: FAMILY MEDICINE | Facility: CLINIC | Age: 78
End: 2018-06-25

## 2018-06-25 NOTE — TELEPHONE ENCOUNTER
Reason for Call: Request for an order or referral:    Order or referral being requested: [facility] is calling request PT orders 2x a week for 6 weeks. Strengthening and balance training.     Date needed: as soon as possible    Has the patient been seen by the PCP for this problem? YES    Additional comments: NA    Phone number Patient can be reached at:  NA    Best Time:  anytime    Can we leave a detailed message on this number?  YES    Call taken on 6/25/2018 at 8:24 AM by Alyx Vora

## 2018-06-25 NOTE — TELEPHONE ENCOUNTER
Patient fell and broke rib/ Physical therapy needed for strength and balance. PT orders 2x a week for 6 weeks--HC nurse given verbal ok.     Thanks! Tennille Soriano RN

## 2018-06-27 ENCOUNTER — OFFICE VISIT (OUTPATIENT)
Dept: RADIATION ONCOLOGY | Facility: CLINIC | Age: 78
End: 2018-06-27
Attending: RADIOLOGY
Payer: MEDICARE

## 2018-06-27 VITALS — SYSTOLIC BLOOD PRESSURE: 119 MMHG | BODY MASS INDEX: 20.24 KG/M2 | WEIGHT: 121.6 LBS | DIASTOLIC BLOOD PRESSURE: 76 MMHG

## 2018-06-27 DIAGNOSIS — C21.1 MALIGNANT NEOPLASM OF ANAL CANAL (H): Primary | ICD-10-CM

## 2018-06-27 PROCEDURE — G0463 HOSPITAL OUTPT CLINIC VISIT: HCPCS | Performed by: RADIOLOGY

## 2018-06-27 RX ORDER — MENTHOL AND ZINC OXIDE .44; 20.625 G/100G; G/100G
OINTMENT TOPICAL
Qty: 1 TUBE | Refills: 3 | Status: SHIPPED | OUTPATIENT
Start: 2018-06-27 | End: 2018-09-21

## 2018-06-27 NOTE — PROGRESS NOTES
FOLLOW-UP VISIT    Patient Name: Elizabeth Taylor      : 1940     Age: 77 year old        ______________________________________________________________________________     Chief Complaint   Patient presents with     Cancer     Pt is here for a follow up:Anal 5400 cGy completed on 3/23/18     /76  Wt 55.2 kg (121 lb 9.6 oz)  BMI 20.24 kg/m2       Pain  Denies    Labs  Other Labs: No    Imaging  None        Other Appointments:     MD Name: Dr Rosas Appointment Date: 18   MD Name: Appointment Date:   MD Name: Appointment Date:   Other Appointment Notes:     Residual Radiation side effect: Some diarrhea, skin irritation unrelated to radiation    Additional Instructions:     Nurse face-to-face time: Level 3:  10 min face to face time      Reviewed dilator instructions with patient again, written instructions and another dilator and Lidocaine was given.  Encouraged daily use.

## 2018-06-27 NOTE — PROGRESS NOTES
Radiation Oncology Follow-up Visit  2018    Elizabeth Taylor  MRN: [id number]   : 1940     DISEASE TREATED:   cT2 N1a M0 (Stage IIIA) squamous cell carcinoma of the anal canal    RADIATION THERAPY DELIVERED:   5,400 cGy to anal canal, 5,040 to left groin and 4,500 cGy to pelvis and right groin given with concurrent Mitomycin-C and 5-FU    INTERVAL SINCE COMPLETION OF RADIATION THERAPY:   3 months. Radiation completed 3/23/2018.      HPI:   Ms. 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 Staging evaluation revealed a 2.5 cm mass located approximately 1 cm from the anal verge with involvement of the internal anal sphincter but not external sphincter or levator ani in addition to two FDG-avid left inguinal lymph nodes.      She underwent treatment with definitive chemoradiotherapy with curative intent. The primary tumor was treated to a total dose of 54 Gy while the node-positive left groin received 50.4 Gy and the right groin and pelvis was treated to 45 Gy in 30 daily fractions using a simultaneous integrated boost technique. Radiation was delivered with concurrent Mitomycin-C and infusional 5-FU with Mitomycin held with her second cycle of therapy due to a concern for treatment-related toxicities given her age and borderline functional status.      Ms. Taylor tolerated the initiation of chemoradiotherapy reasonably well with relatively mild acute treatment-related toxicities. Approximately midway through her radiotherapy treatment course, she developed worsening diarrhea which was initially managed with loperamide and escalated to tincture of opium with IV fluid rehydration when she continued to have >6-8 bowel movements per day. Despite escalation in her medical management, she continued to have intractable diarrhea and the decision  was made to admit her at the beginning of her final week of radiotherapy on 3/19/2018 for further evaluation and cares due to her uncontrolled symptoms, declining functional status and concern for her ability to care for herself after she suffered a fall while at home. She completed the remainder of her radiotherapy treatment as an inpatient without incident. Overall, she did not have any unplanned breaks in radiotherapy. Following completion of chemoradiation, she had re-staging imaging with PET/CT and MRI on  5/25/2018, which were negative for new or progressive disease. She returns for routine follow up, 3 months after completion of radiation therapy.       INTERVAL HISTORY:   Ms. Taylor was last seen for follow up 1 month ago. She had been slowly recovering but continued to have discomfort around perianal skin. She was also still quite deconditioned, but was regularly doing physical therapy. Restaging imaging with PET/CT and MRI were negative for new or progressive disease. On exam today, ***       PHYSICAL EXAM:   ***   Weight 50.4 kg    Gen: Alert, in NAD. Sitting comfortably in a wheelchair.   Pulm: No wheezing, stridor or respiratory distress  CV: Well-perfused, no cyanosis, no pedal edema  Pelvic/Rectal: Significant improvement of dermatitis in bilateral groin. Still some resolving dermatitis present over left groin. Perianal skin appears to be healing well with some hyperpigmentation but no desquamation appreciated. There continues to be a small amount of induration within the external aspect of left posterolateral anal canal corresponding to the site of the previous tumor but no obvious masses nor any palpable inguinal adenopathy. The perianal region continues to be quite tender and examination of the more proximal aspect of the anal canal by BRITTNEY was unable to be performed secondary to patient discomfort.    LABS AND IMAGING:  Reviewed.    IMPRESSION:   Ms. Taylor is a 77 year old female with a stage IIIA  squamous cell carcinoma of the anal canal with significant treatment related toxicities requiring hospital admission towards the end of treatment. She continues to slowly recover while residing at a TCU. She has had a nice response to therapy both by restaging imaging and physical exam.     PLAN:   1. Follow up with medical oncology (Dr. Ray) as scheduled in late 6/2018  2. Follow-up with colorectal surgery (Dr. Rosas) next week   3. Follow-up in radiation oncology clinic in 1 month  4. Recommend beginning vaginal dilator use. If she is unable to tolerate small size dilator, recommend using tampon. Lidocaine gel was given to her to help assist with placement of dilator.     Juan Arzate MD  Resident, Radiation Oncology    ***

## 2018-06-27 NOTE — PROGRESS NOTES
Department of Radiation Oncology  [facility]  [address] [place] [address]  [phone]       Radiation Oncology Follow-up Visit  2018      Elizabeth Taylor  MRN: [id number]   : 1940     DISEASE TREATED:   cT2 N1a M0 (stage IIIA) squamous cell carcinoma of the anal canal    RADIATION THERAPY DELIVERED:   5,400 cGy to the anal canal, 5,040 cGy to the left groin and 4,500 cGy to the pelvis and right groin      SYSTEMIC THERAPY:  Concurrent mitomycin-C and 5-FU    INTERVAL SINCE COMPLETION OF RADIATION THERAPY:   Approximately 3 months. Completed 3/23/2018.    SUBJECTIVE:   Elizabeth Taylor is a 77 year old female who was diagnosed with a locally advanced squamous cell carcinoma of the anal canal after presenting with progressive anal pain and small amounts of bright red blood per rectum. Staging evaluation revealed a 2.5 cm mass located approximately 1 cm from the anal verge with involvement of the internal anal sphincter in addition to 2 FDG-avid left inguinal nodes.    Ms. Taylor underwent treatment with definitive chemoradiotherapy with curative intent. The primary tumor was treated to a total dose of 54 Gy while the node-positive left groin received 50.4 Gy and the right groin and pelvis was treated to 45 Gy in 30 daily fractions using a simultaneous integrated boost technique. Radiotherapy was delivered with concurrent Mitomycin-C and infusional 5-FU with Mitomycin held with her second cycle of therapy due to concern for treatment-related toxicities given her age and borderline functional status.    Ms. Taylor tolerated the initiation of therapy relatively well although approximately midway through her treatment course developing worsening diarrhea, eventually requiring inpatient admission during her final week of radiation. Following completion of treatment, she has continued to make a slow but steady recovery and she returns to radiation  oncology clinic today for a routine surveillance visit.    On exam, Ms. Taylor is moderately improved since she was last seen in clinic on 5/25/2018. She has recently been discharged from her transitional care facility and is currently staying with her sister. She is able to perform some minor self-cares although continues to remain dependent on home health aides and friends for her activities of daily living. She is currently having several bowel movements per day although these are now formed stools versus her previous diarrhea and she has also been able to discontinue her Joiner catheter approximately 2 weeks ago. She otherwise reports no fever/chills, nausea/vomiting, dyspnea, chest pain or abdominal pain and her remaining ROS are grossly unchanged from her prior visit.    PHYSICAL EXAM:  /76  Wt 55.2 kg (121 lb 9.6 oz)  BMI 20.24 kg/m2  Gen: Mildly fatigued-appearing but substantially improved since her prior examination. Currently ambulating with the aid of a walker.  Eyes: PERRL, EOMI  Pulm: No wheezing, stridor or respiratory distress  CV: Well-perfused, no cyanosis, very mild pedal edema which is substantially improved from her end of therapy examination  Rectal: Hyperpigmentation with resolving desquamation involving the perianal region. No residual masses or nodularity noted within the anal verge. Full BRITTNEY was again deferred secondary to patient discomfort. Mixed hyper- and hypopigmentation of the left inguinal region without any palpable adenopathy. The right groin has substantially less hyperpigmentation with no palpable adenopathy as well.  Musculoskeletal: Diffusely diminished muscle bulk with normal tone  Skin: Skin changes within the perineum and groin as described above from the sequela of her prior radiotherapy otherwise normal color and turgor.    LABS AND IMAGING:  Reviewed.    IMPRESSION:   Ms. Taylor is a 77 year old female with a previous cT2 N1a M0 squamous cell carcinoma of the anal  canal status post definitive chemoradiotherapy. She had considerable toxicities towards the end of radiotherapy and continues to make a slow but steady improvement in her acute radiation-induced side effects. There is no clinical evidence, albeit on limited exam, nor radiographic evidence concerning for recurrent/residual disease.    PLAN:   1. Follow-up in radiation oncology clinic with NP in 1 month in coordination with colorectal surgery  2. Strongly recommended initiation of vaginal dilator use to prevent stenosis of the vaginal introitus (teaching provided in clinic today)  3. Continue ongoing supportive cares    Zaire Madison MD/PhD    Department of Radiation Oncology  [facility]

## 2018-06-27 NOTE — MR AVS SNAPSHOT
After Visit Summary   6/27/2018    Elizabeth Taylor    MRN: [id number]           Patient Information     Date Of Birth          1940        Visit Information        Provider Department      6/27/2018 9:00 AM Zaire Madison MD Radiation Oncology Clinic        Today's Diagnoses     Malignant neoplasm of anal canal (H)    -  1       Follow-ups after your visit        Your next 10 appointments already scheduled     Jul 30, 2018 11:30 AM CDT   (Arrive by 11:15 AM)   Return Visit with Emir Rosas MD   [facility] ([facility])    [address] [place] [address]  4th Floor  [facility] [id number] [phone]            Aug 31, 2018 11:00 AM CDT   [facility] Draw with  DGIT LAB DRAW   [facility] Lab Draw ([facility])    [address] [place] [address]  [address]  [facility] [id number] [phone]            Aug 31, 2018 11:30 AM CDT   (Arrive by 11:15 AM)   Return Visit with Shen Ray MD   [facility] ([facility])    [address] [place] [address]  [address]  [facility] [id number] [phone]              Who to contact     Please call your clinic at [phone] to:    Ask questions about your health    Make or cancel appointments    Discuss your medicines    Learn about your test results    Speak to your doctor            Additional Information About Your Visit        [employer] Information     [employer] gives you secure access to your electronic health record. If you see a primary care provider, you can also send messages to your care team and make appointments. If you have questions, please call your primary care clinic.  If you do not have a primary care provider, please call [phone] and they will assist you.      [employer] is an electronic gateway that provides easy, online access to your medical records. With [employer] you can request a clinic appointment,  read your test results, renew a prescription or communicate with your care team.     To access your existing account, please contact your [facility] or call [phone] for assistance.        Care EveryWhere ID     This is your Care EveryWhere ID. This could be used by other organizations to access your [facility] medical records  [id number]        Your Vitals Were     BMI (Body Mass Index)                   20.24 kg/m2            Blood Pressure from Last 3 Encounters:   06/27/18 119/76   06/22/18 115/75   05/30/18 101/68    Weight from Last 3 Encounters:   06/27/18 55.2 kg (121 lb 9.6 oz)   06/22/18 54.7 kg (120 lb 9.6 oz)   05/30/18 50.3 kg (111 lb)              Today, you had the following     No orders found for display         Where to get your medicines      These medications were sent to [facility] - [place], [place] - [address]  [address], [place] [address]     Phone:  [phone]     menthol-zinc oxide 0.44-20.625 % Oint ointment          Primary Care Provider Office Phone # Fax #    Felisha LAURA Davis [phone]       [address] [facility] [address]        Equal Access to Services     IONA DALEY : Hadii chelo ku hadasho Soomaali, waaxda luqadaha, qaybta kaalmada adeegyada, cyndy muñoz hayelly walton . So [facility] [phone].    ATENCIÓN: Si habla español, tiene a ghosh disposición servicios gratuitos de asistencia lingüística. Jessica al [phone].    We comply with applicable federal civil rights laws and [place] laws. We do not discriminate on the basis of race, color, national origin, age, disability, sex, sexual orientation, or gender identity.            Thank you!     Thank you for choosing RADIATION ONCOLOGY CLINIC  for your care. Our goal is always to provide you with excellent care. Hearing back from our patients is one way we can continue to improve our services. Please take a few minutes to  complete the written survey that you may receive in the mail after your visit with us. Thank you!             Your Updated Medication List - Protect others around you: Learn how to safely use, store and throw away your medicines at www.disposemymeds.org.          This list is accurate as of 6/27/18 10:09 AM.  Always use your most recent med list.                   Brand Name Dispense Instructions for use Diagnosis    acetaminophen 325 MG tablet    TYLENOL     Take 2 tablets (650 mg) by mouth every 4 hours as needed for pain    Closed fracture of one rib of right side, initial encounter       artificial saliva Liqd liquid      Swish and spit 5-10 mLs in mouth 4 times daily as needed for dry mouth    Mouth dryness       calcium-vitamin D 600-400 MG-UNIT per tablet    CALTRATE    60 tablet    Take 1 tablet by mouth daily    Age-related osteoporosis with current pathological fracture, initial encounter       diphenoxylate-atropine 2.5-0.025 MG per tablet    LOMOTIL    40 tablet    Take 1 tablet by mouth 4 times daily as needed for diarrhea . If having less than two bowel movements daily, DO NOT GIVE    Diarrhea, unspecified type, Malignant neoplasm of anal canal (H)       loperamide 2 MG tablet    LOPERAMIDE A-D    60 tablet    Take 1 tablet (2 mg) by mouth 4 times daily as needed for diarrhea . If having less than two bowel movements daily, DO NOT GIVE    Malignant neoplasm of anal canal (H)       menthol-zinc oxide 0.44-20.625 % Oint ointment    CALMOSEPTINE    1 Tube    Apply thick layer to irritated perianal skin 4 times per day and after bowel movements.    Malignant neoplasm of anal canal (H)       methocarbamol 500 MG tablet    ROBAXIN    120 tablet    Take 1 tablet (500 mg) by mouth 2 times daily as needed for muscle spasms    Closed fracture of one rib of right side, initial encounter       multivitamin, therapeutic Tabs tablet      Take 1 tablet by mouth daily    Age-related osteoporosis with current  pathological fracture, initial encounter       ondansetron 8 MG ODT tab    ZOFRAN-ODT    60 tablet    Take 1 tablet (8 mg) by mouth every 8 hours as needed For nausea    Malignant neoplasm of anal canal (H)       simethicone 40 MG/0.6ML suspension    MYLICON     Take 0.6 mLs (40 mg) by mouth every 6 hours as needed for cramping    Malignant neoplasm of anal canal (H), Flatulence, eructation and gas pain       traMADol 50 MG tablet    ULTRAM    20 tablet    Take 1 tablet (50 mg) by mouth every 6 hours    Closed fracture of one rib of right side, initial encounter       vitamin B complex with vitamin C Tabs tablet      Take 1 tablet by mouth daily

## 2018-06-27 NOTE — LETTER
2018      RE: Elizabeth Taylor  [address] [facility] [address]       FOLLOW-UP VISIT    Patient Name: Elizabeth Taylor      : 1940     Age: 77 year old        ______________________________________________________________________________     Chief Complaint   Patient presents with     Cancer     Pt is here for a follow up:Anal 5400 cGy completed on 3/23/18     /76  Wt 55.2 kg (121 lb 9.6 oz)  BMI 20.24 kg/m2       Pain  Denies    Labs  Other Labs: No    Imaging  None        Other Appointments:     MD Name: Dr Rosas Appointment Date: 18   MD Name: Appointment Date:   MD Name: Appointment Date:   Other Appointment Notes:     Residual Radiation side effect: Some diarrhea, skin irritation unrelated to radiation    Additional Instructions:     Nurse face-to-face time: Level 3:  10 min face to face time      Reviewed dilator instructions with patient again, written instructions and another dilator and Lidocaine was given.  Encouraged daily use.       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 Completed 3/23/2018.    SUBJECTIVE:   Elizabeth Taylor is a 77 year old female who was diagnosed with a locally advanced squamous cell carcinoma of the anal canal after presenting with progressive anal pain and small amounts of bright red blood per rectum. Staging evaluation revealed a 2.5 cm mass located approximately 1 cm from  the anal verge with involvement of the internal anal sphincter in addition to 2 FDG-avid left inguinal nodes.    Ms. Taylor underwent treatment with definitive chemoradiotherapy with curative intent. The primary tumor was treated to a total dose of 54 Gy while the node-positive left groin received 50.4 Gy and the right groin and pelvis was treated to 45 Gy in 30 daily fractions using a simultaneous integrated boost technique. Radiotherapy was delivered with concurrent Mitomycin-C and infusional 5-FU with Mitomycin held with her second cycle of therapy due to concern for treatment-related toxicities given her age and borderline functional status.    Ms. Taylor tolerated the initiation of therapy relatively well although approximately midway through her treatment course developing worsening diarrhea, eventually requiring inpatient admission during her final week of radiation. Following completion of treatment, she has continued to make a slow but steady recovery and she returns to radiation oncology clinic today for a routine surveillance visit.    On exam, Ms. Taylor is moderately improved since she was last seen in clinic on 5/25/2018. She has recently been discharged from her transitional care facility and is currently staying with her sister. She is able to perform some minor self-cares although continues to remain dependent on home health aides and friends for her activities of daily living. She is currently having several bowel movements per day although these are now formed stools versus her previous diarrhea and she has also been able to discontinue her Joiner catheter approximately 2 weeks ago. She otherwise reports no fever/chills, nausea/vomiting, dyspnea, chest pain or abdominal pain and her remaining ROS are grossly unchanged from her prior visit.    PHYSICAL EXAM:  /76  Wt 55.2 kg (121 lb 9.6 oz)  BMI 20.24 kg/m2  Gen: Mildly fatigued-appearing but substantially improved since her prior  examination. Currently ambulating with the aid of a walker.  Eyes: PERRL, EOMI  Pulm: No wheezing, stridor or respiratory distress  CV: Well-perfused, no cyanosis, very mild pedal edema which is substantially improved from her end of therapy examination  Rectal: Hyperpigmentation with resolving desquamation involving the perianal region. No residual masses or nodularity noted within the anal verge. Full BRITTNEY was again deferred secondary to patient discomfort. Mixed hyper- and hypopigmentation of the left inguinal region without any palpable adenopathy. The right groin has substantially less hyperpigmentation with no palpable adenopathy as well.  Musculoskeletal: Diffusely diminished muscle bulk with normal tone  Skin: Skin changes within the perineum and groin as described above from the sequela of her prior radiotherapy otherwise normal color and turgor.    LABS AND IMAGING:  Reviewed.    IMPRESSION:   Ms. Taylor is a 77 year old female with a previous cT2 N1a M0 squamous cell carcinoma of the anal canal status post definitive chemoradiotherapy. She had considerable toxicities towards the end of radiotherapy and continues to make a slow but steady improvement in her acute radiation-induced side effects. There is no clinical evidence, albeit on limited exam, nor radiographic evidence concerning for recurrent/residual disease.    PLAN:   1. Follow-up in radiation oncology clinic with NP in 1 month in coordination with colorectal surgery  2. Strongly recommended initiation of vaginal dilator use to prevent stenosis of the vaginal introitus (teaching provided in clinic today)  3. Continue ongoing supportive cares    Zaire Madison MD/PhD    Department of Radiation Oncology  [facility]

## 2018-06-28 ENCOUNTER — TELEPHONE (OUTPATIENT)
Dept: FAMILY MEDICINE | Facility: CLINIC | Age: 78
End: 2018-06-28

## 2018-06-28 NOTE — TELEPHONE ENCOUNTER
Rashaun called from [place] home [facility] asking if PCP will follow patient during home care?    Please advise

## 2018-06-28 NOTE — TELEPHONE ENCOUNTER
TRISTON Davis are you willing to follow her as long as she could still come in to clinic for her appointment?

## 2018-07-03 ENCOUNTER — TELEPHONE (OUTPATIENT)
Dept: FAMILY MEDICINE | Facility: CLINIC | Age: 78
End: 2018-07-03

## 2018-07-03 NOTE — TELEPHONE ENCOUNTER
Reason for Call:  Home Health Care    Emili with [facility] called regarding (reason for call): verbal orders     Orders are needed for this patient. Speech therapy to evaluate    PT:     OT:     Skilled Nursing:     Pt Provider: Felisha Davis    Phone Number Homecare Nurse can be reached at: [phone]    Can we leave a detailed message on this number? YES    Phone number patient can be reached at:     Best Time:     Call taken on 7/3/2018 at 3:01 PM by Kira Pruitt

## 2018-07-05 ENCOUNTER — OFFICE VISIT (OUTPATIENT)
Dept: FAMILY MEDICINE | Facility: CLINIC | Age: 78
End: 2018-07-05
Payer: MEDICARE

## 2018-07-05 VITALS
OXYGEN SATURATION: 96 % | HEART RATE: 89 BPM | DIASTOLIC BLOOD PRESSURE: 62 MMHG | TEMPERATURE: 98.1 F | SYSTOLIC BLOOD PRESSURE: 104 MMHG | WEIGHT: 116 LBS | HEIGHT: 65 IN | BODY MASS INDEX: 19.33 KG/M2

## 2018-07-05 DIAGNOSIS — C21.1 MALIGNANT NEOPLASM OF ANAL CANAL (H): Primary | ICD-10-CM

## 2018-07-05 DIAGNOSIS — R60.0 PERIPHERAL EDEMA: ICD-10-CM

## 2018-07-05 DIAGNOSIS — R35.0 URINARY FREQUENCY: ICD-10-CM

## 2018-07-05 DIAGNOSIS — N76.0 VAGINITIS AND VULVOVAGINITIS: ICD-10-CM

## 2018-07-05 LAB
SPECIMEN SOURCE: NORMAL
WET PREP SPEC: NORMAL

## 2018-07-05 PROCEDURE — 87210 SMEAR WET MOUNT SALINE/INK: CPT | Performed by: FAMILY MEDICINE

## 2018-07-05 PROCEDURE — 99214 OFFICE O/P EST MOD 30 MIN: CPT | Performed by: FAMILY MEDICINE

## 2018-07-05 NOTE — PROGRESS NOTES
"  SUBJECTIVE:   Elizabeth Taylor is a 77 year old female who presents to clinic today for the following health issues:    1: Patient is here for evaluation of home care needs. She continues to have limited mobility, significant pain as she is recuperating from her chemo and radiation. Still is lving with her sister who is helping out greatly.     2: Swollen ankles-These have been chronic but a bit worse since she was unable to elevate as much as before. She is using compression stocking. Gets better when up.     3: Catheter area wound that she is concern- she had radiation over the perineal area. Since then she has had redness and swelling in the vulvar area. This has improved but still significant. She also has urinary symptoms. She has been using calmoseptine which helps some.     med hx, family hx, and soc hx reviewed and updated     Ros negative for cv, resp, derm, gu symptoms otherwise.    OBJECTIVE: /62 (BP Location: Right arm, Patient Position: Sitting, Cuff Size: Adult Regular)  Pulse 89  Temp 98.1  F (36.7  C) (Oral)  Ht 5' 5\" (1.651 m)  Wt 116 lb (52.6 kg)  SpO2 96%  BMI 19.3 kg/m2 Exam:  GENERAL APPEARANCE: healthy, alert and no distress  EYES: Eyes grossly normal to inspection  HENT: ear canals and TM's normal and nose and mouth without ulcers or lesions  NECK: no adenopathy, no asymmetry, masses, or scars and thyroid normal to palpation  RESP: lungs clear to auscultation - no rales, rhonchi or wheezes  CV: regular rates and rhythm, normal S1 S2, no S3 or S4 and no murmur, click or rub -  ABDOMEN:  soft, nontender, no HSM or masses and bowel sounds normal  GU_female: erythematous and swelling of the introitus extending back towards the perineum. Minimal discharge noted.   MS: 1+ edema peripheral to about 2/3 way up to the knees. Nontender. No cords. No erythema.   SKIN: no suspicious lesions or rashes     Results for orders placed or performed in visit on 07/05/18   Wet prep   Result Value " Ref Range    Specimen Description Vagina     Wet Prep No Trichomonas seen     Wet Prep No clue cells seen     Wet Prep No yeast seen           ICD-10-CM    1. Malignant neoplasm of anal canal (H) C21.1 HOME CARE NURSING REFERRAL   2. Vaginitis and vulvovaginitis N76.0 Wet prep     Hydrocortisone (BUTT PASTE, WITH H.C,)     DISCONTINUED: Hydrocortisone (BUTT PASTE, WITH H.C,)   3. Peripheral edema R60.9    4. Urinary frequency R35.0 CANCELED: *UA reflex to Microscopic and Culture ([facility] (except [place] and [facility])    we will try butt paste diaper cream to he introitus. Likely this is the result of radiation. The edema is only mildy worse than her usual. Nothing to suggest a DVT or CHF. She does have some mild hypoproteinemia and anemia which may be compounding the problem. Elevation and compression discussed. Home care orders done.             Documentation of a Face-to-Face Physician Encounter July 6, 2018    Elizabeth Taylor  1940  [id number]    I certify that this patient is under my care and that I, or a nurse practitioner or physician's assistant working with me, had a face-to-face encounter that meets the physician face-to-face encounter requirements with this patient on: 7/6/2018.    The encounter with the patient was in whole, or in part, for the following medical condition, which is the primary reason for home health care:  Encounter Diagnoses   Name Primary?     Vaginitis and vulvovaginitis      Peripheral edema      Urinary frequency      Malignant neoplasm of anal canal (H) Yes       I certify that, based on my findings, the following services are medically necessary home health services: Nursing, Occupational Therapy and Physical Therapy she needs rn for general health assessment, she needs ot/pt for safety trainging fall prevention, ADL assessment, she is homebound due to requiring the assistance of others to leave the home.     My clinical findings support the need for  the above services because: of her inability to leave home safely due to pain and weakness.     Further, I certify that my clinical findings support that this patient is homebound (i.e. absences from home require considerable and taxing effort and are for medical reasons or Restorationism services or infrequently or of short duration when for other reasons) because: taxing effort due to pain/weakness      Physician signature ___________________________________   July 6, 2018  Physician name: Baron Seth MD    Fax ([phone]) or scan/email ([email or web address]) this completed document to [facility] within 24 hours of the referral date.  Questions: [phone].

## 2018-07-05 NOTE — MR AVS SNAPSHOT
"              After Visit Summary   7/5/2018    Elizabeth Taylor    MRN: [id number]           Patient Information     Date Of Birth          1940        Visit Information        Provider Department      7/5/2018 11:40 AM Baron Seth MD [facility]        Today's Diagnoses     Vaginitis and vulvovaginitis    -  1    Peripheral edema        Urinary frequency        Malignant neoplasm of anal canal (H)          Care Instructions    Elevate your legs, Use the compression stockings, work on increasing protein in your diet.     Use the \"butt paste\" on the irritated skin up to 4 times daily. .           Follow-ups after your visit        Additional Services     HOME CARE NURSING REFERRAL       **Order classes of: [facility], [facility] and [facility] will route to the Home Care and Hospice Referral Pool.  Home Care or Hospice will then contact the patient to schedule their appointment.**    If you do not hear from Home Care and Hospice, or you would like to call to schedule, please call the referring place of service that your provider has listed below.  ______________________________________________________________________    Your provider has referred you to: she has been using [facility]. She would like to continue with them.    Extended Emergency Contact Information  Primary Emergency Contact: ACE PORTILLO [facility]  Mobile Phone: [phone]  Relation: Friend  Secondary Emergency Contact: AMARJIT CHAMBERS   [facility]  Home Phone: [phone]  Relation: Sister    Patient Anticipated Discharge Date: KATHERINE   RN, PT, HHA to begin 24 - 48 hours after discharge.  PLEASE EVALUATE AND TREAT (Evaluation timeline is 24 - 48 hrs. Please call if there is need for a variance to this timeline).    REASON FOR REFERRAL: Assessment & Treatment: kaylen needs physical therapy. Rn. OT    ADDITIONAL SERVICES NEEDED: per evaluation    OTHER PERTINENT INFORMATION: Patient was " last seen by provider on July 5, 2018  for HOME care evaluation..    Current Outpatient Prescriptions:  acetaminophen (TYLENOL) 325 MG tablet, Take 2 tablets (650 mg) by mouth every 4 hours as needed for pain, Disp: , Rfl:   Hydrocortisone (BUTT PASTE, WITH H.C,), Use topically 3-4 times daily, Disp: 1 Tube, Rfl: 1  menthol-zinc oxide (CALMOSEPTINE) 0.44-20.625 % OINT ointment, Apply thick layer to irritated perianal skin 4 times per day and after bowel movements., Disp: 1 Tube, Rfl: 3  methocarbamol (ROBAXIN) 500 MG tablet, Take 1 tablet (500 mg) by mouth 2 times daily as needed for muscle spasms, Disp: 120 tablet, Rfl: 0  multivitamin, therapeutic (THERA-VIT) TABS tablet, Take 1 tablet by mouth daily, Disp: , Rfl: 0  traMADol (ULTRAM) 50 MG tablet, Take 1 tablet (50 mg) by mouth every 6 hours, Disp: 20 tablet, Rfl: 0  vitamin B complex with vitamin C (VITAMIN  B COMPLEX) TABS tablet, Take 1 tablet by mouth daily, Disp: , Rfl:   artificial saliva (BIOTENE DRY MOUTHWASH) LIQD liquid, Swish and spit 5-10 mLs in mouth 4 times daily as needed for dry mouth (Patient not taking: Reported on 6/22/2018), Disp: , Rfl:   calcium-vitamin D (CALTRATE) 600-400 MG-UNIT per tablet, Take 1 tablet by mouth daily (Patient not taking: Reported on 6/22/2018), Disp: 60 tablet, Rfl:   diphenoxylate-atropine (LOMOTIL) 2.5-0.025 MG per tablet, Take 1 tablet by mouth 4 times daily as needed for diarrhea . If having less than two bowel movements daily, DO NOT GIVE (Patient not taking: Reported on 6/22/2018), Disp: 40 tablet, Rfl: 0  loperamide (LOPERAMIDE A-D) 2 MG tablet, Take 1 tablet (2 mg) by mouth 4 times daily as needed for diarrhea . If having less than two bowel movements daily, DO NOT GIVE (Patient not taking: Reported on 6/22/2018), Disp: 60 tablet, Rfl: 3  ondansetron (ZOFRAN-ODT) 8 MG ODT tab, Take 1 tablet (8 mg) by mouth every 8 hours as needed For nausea (Patient not taking: Reported on 6/22/2018), Disp: 60 tablet, Rfl:    simethicone (MYLICON) 40 MG/0.6ML suspension, Take 0.6 mLs (40 mg) by mouth every 6 hours as needed for cramping (Patient not taking: Reported on 6/22/2018), Disp: , Rfl:       Patient Active Problem List:     CARDIOVASCULAR SCREENING; LDL GOAL LESS THAN 160     Dry eye syndrome     Malignant neoplasm of anal canal (H)     Diarrhea      Documentation of Face to Face and Certification for Home Health Services    I certify that patient, Elizabeth Taylor is under my care and that I, or a Nurse Practitioner or Physician's Assistant working with me, had a face-to-face encounter that meets the physician face-to-face encounter requirements with this patient on: 7/5/2018.    This encounter with the patient was in whole, or in part, for the following medical condition, which is the primary reason for Home Health Care: Anal cancer.    I certify that, based on my findings, the following services are medically necessary Home Health Services: Nursing and Physical Therapy    My clinical findings support the need for the above services because: Nurse is needed: To assess wounds and help with port after changes in medications or other medical regimen..    Further, I certify that my clinical findings support that this patient is homebound (i.e. absences from home require considerable and taxing effort and are for medical reasons or Buddhism services or infrequently or of short duration when for other reasons) because: Requires assistance of another person or specialized equipment to access medical services because patient: Requires supervision of another for safe transfer..    Based on the above findings, I certify that this patient is confined to the home and needs intermittent skilled nursing care, physical therapy and/or speech therapy.  The patient is under my care, and I have initiated the establishment of the plan of care.  This patient will be followed by a physician who will periodically review the plan of  care.    Physician/Provider to provide follow up care: Felisha Davis certified Physician at time of discharge: Baron Seth MD    Please be aware that coverage of these services is subject to the terms and limitations of your health insurance plan.  Call member services at your health plan with any benefit or coverage questions.                  Your next 10 appointments already scheduled     Jul 30, 2018 10:30 AM CDT   Return Visit with GRAHAM Cook CNP   Radiation Oncology Clinic ([facility])    [facility]  1st Floor  [address] [address]   [phone]            Jul 30, 2018 11:30 AM CDT   (Arrive by 11:15 AM)   Return Visit with Emir Rosas MD   [facility] ([facility])    [address]  4th Floor  [facility] [address]   [phone]            Aug 31, 2018 11:00 AM CDT   [facility] Draw with  [facility] DRAW   [facility]    [address]  [address]  [facility] [address] [phone]            Aug 31, 2018 11:30 AM CDT   (Arrive by 11:15 AM)   Return Visit with Shen Ray MD   [facility] ([facility])    [address]  [address]  [facility] [address] [phone]              Who to contact     If you have questions or need follow up information about today's clinic visit or your schedule please contact [facility] directly at [phone].  Normal or non-critical lab and imaging results will be communicated to you by MyChart, letter or phone within 4 business days after the clinic has received the results. If you do not hear from us within 7 days, please contact the clinic through MyChart or phone. If you have a critical or abnormal lab result, we will notify you by phone as soon as  "possible.  Submit refill requests through [employer] or call your pharmacy and they will forward the refill request to us. Please allow 3 business days for your refill to be completed.          Additional Information About Your Visit        [email or web address] Information     [employer] gives you secure access to your electronic health record. If you see a primary care provider, you can also send messages to your care team and make appointments. If you have questions, please call your primary care clinic.  If you do not have a primary care provider, please call [phone] and they will assist you.        Care EveryWhere ID     This is your Care EveryWhere ID. This could be used by other organizations to access your [place] medical records  [id number]        Your Vitals Were     Pulse Temperature Height Pulse Oximetry BMI (Body Mass Index)       89 98.1  F (36.7  C) (Oral) 5' 5\" (1.651 m) 96% 19.3 kg/m2        Blood Pressure from Last 3 Encounters:   07/05/18 104/62   06/27/18 119/76   06/22/18 115/75    Weight from Last 3 Encounters:   07/05/18 116 lb (52.6 kg)   06/27/18 121 lb 9.6 oz (55.2 kg)   06/22/18 120 lb 9.6 oz (54.7 kg)              We Performed the Following     *UA reflex to Microscopic and Culture ([facility] (except [place] and [place])     HOME CARE NURSING REFERRAL     Wet prep          Today's Medication Changes          These changes are accurate as of 7/5/18 12:52 PM.  If you have any questions, ask your nurse or doctor.               Start taking these medicines.        Dose/Directions    BUTT PASTE (WITH H.C)   Used for:  Vaginitis and vulvovaginitis   Started by:  Baron Seth MD        Use topically 3-4 times daily   Quantity:  1 Tube   Refills:  1            Where to get your medicines      These medications were sent to [facility] [place], [place] - [address] [address]  [address] [address] [place] [address]     Phone:  [phone]     BUTT PASTE (WITH H.C)    "             Primary Care Provider Office Phone # Fax #    Felisha SANCHEZ CHAYITO Davis [phone] [address] [facility] [address]        Equal Access to Services     IONA DALEY : Hadii aad ku hadrayo Sotheodoreali, waaxda luqadaha, qaybta kaalmada adeegyada, cyndy chakraborty seymourargentina real isabelle catalan. So [facility] [phone].    ATENCIÓN: Si habla español, tiene a ghosh disposición servicios gratuitos de asistencia lingüística. Llame al [phone].    We comply with applicable federal civil rights laws and [place] laws. We do not discriminate on the basis of race, color, national origin, age, disability, sex, sexual orientation, or gender identity.            Thank you!     Thank you for choosing [facility]  for your care. Our goal is always to provide you with excellent care. Hearing back from our patients is one way we can continue to improve our services. Please take a few minutes to complete the written survey that you may receive in the mail after your visit with us. Thank you!             Your Updated Medication List - Protect others around you: Learn how to safely use, store and throw away your medicines at www.disposemymeds.org.          This list is accurate as of 7/5/18 12:52 PM.  Always use your most recent med list.                   Brand Name Dispense Instructions for use Diagnosis    acetaminophen 325 MG tablet    TYLENOL     Take 2 tablets (650 mg) by mouth every 4 hours as needed for pain    Closed fracture of one rib of right side, initial encounter       artificial saliva Liqd liquid      Swish and spit 5-10 mLs in mouth 4 times daily as needed for dry mouth    Mouth dryness       BUTT PASTE (WITH H.C)     1 Tube    Use topically 3-4 times daily    Vaginitis and vulvovaginitis       calcium-vitamin D 600-400 MG-UNIT per tablet    CALTRATE    60 tablet    Take 1 tablet by mouth daily    Age-related osteoporosis with current pathological fracture, initial encounter        diphenoxylate-atropine 2.5-0.025 MG per tablet    LOMOTIL    40 tablet    Take 1 tablet by mouth 4 times daily as needed for diarrhea . If having less than two bowel movements daily, DO NOT GIVE    Diarrhea, unspecified type, Malignant neoplasm of anal canal (H)       loperamide 2 MG tablet    LOPERAMIDE A-D    60 tablet    Take 1 tablet (2 mg) by mouth 4 times daily as needed for diarrhea . If having less than two bowel movements daily, DO NOT GIVE    Malignant neoplasm of anal canal (H)       menthol-zinc oxide 0.44-20.625 % Oint ointment    CALMOSEPTINE    1 Tube    Apply thick layer to irritated perianal skin 4 times per day and after bowel movements.    Malignant neoplasm of anal canal (H)       methocarbamol 500 MG tablet    ROBAXIN    120 tablet    Take 1 tablet (500 mg) by mouth 2 times daily as needed for muscle spasms    Closed fracture of one rib of right side, initial encounter       multivitamin, therapeutic Tabs tablet      Take 1 tablet by mouth daily    Age-related osteoporosis with current pathological fracture, initial encounter       ondansetron 8 MG ODT tab    ZOFRAN-ODT    60 tablet    Take 1 tablet (8 mg) by mouth every 8 hours as needed For nausea    Malignant neoplasm of anal canal (H)       simethicone 40 MG/0.6ML suspension    MYLICON     Take 0.6 mLs (40 mg) by mouth every 6 hours as needed for cramping    Malignant neoplasm of anal canal (H), Flatulence, eructation and gas pain       traMADol 50 MG tablet    ULTRAM    20 tablet    Take 1 tablet (50 mg) by mouth every 6 hours    Closed fracture of one rib of right side, initial encounter       vitamin B complex with vitamin C Tabs tablet      Take 1 tablet by mouth daily

## 2018-07-05 NOTE — PATIENT INSTRUCTIONS
"Elevate your legs, Use the compression stockings, work on increasing protein in your diet.     Use the \"butt paste\" on the irritated skin up to 4 times daily. .   "

## 2018-07-06 NOTE — PLAN OF CARE
Message returned.    Problem: Patient Care Overview  Goal: Plan of Care/Patient Progress Review  VSS. Afebrile. Soft BP's at baseline. Sats >92 on RA w/ Loose BM's x 2. Opium tincture given around 0120. Wing counts. Painful movement due to R rib fracture. Declined pain meds. Using heating pad and slow movements to prevent pain. Pt moves slowly w/ assist x 1. Labial edema and radiation burn on sacrum. Aquaphor applied. Last radiation scheduled for 0900 this am. Pt has pyridium available 3 times daily PRN for painful urination. May need TCU placement upon d/c. Continue POC.

## 2018-07-12 ENCOUNTER — PATIENT OUTREACH (OUTPATIENT)
Dept: CARE COORDINATION | Facility: CLINIC | Age: 78
End: 2018-07-12

## 2018-07-12 NOTE — PROGRESS NOTES
Clinic Care Coordination Contact    Situation: Patient chart reviewed by care coordinator.    Background:   Patient was hospitalized at [facility] from 3/19 to 4/4/2018 with diagnosis of   Date of Admission:  3/19/2018  Date of Discharge:  4/4/2018  Discharging Provider: Brenda Gurrola  Date of Service (when I saw the patient): 04/04/18          Discharge Diagnoses      Malignant neoplasm of anal canal   Dehydration  Diarrhea, unspecified type  Flatulence, eructation and gas pain  Mouth dryness  Age-related osteoporosis with current pathological fracture, initial encounter  Fall, initial encounter  Closed fracture of three ribs of right side, initial encounter.     Assessment: Patient continues to have home care through [facility] Emili Means home care RN ( [phone].     CC left contact information on Emili's VM to call cc when they discharge the patient     Plan/Recommendations: CC will follow up with the patient when discharged from home care     Quynh Mckeon RN / Clinical Care Coordinator     [facility]   [email or web address]  Office :  [phone] / Fax :  [phone]

## 2018-07-25 ENCOUNTER — HOSPITAL ENCOUNTER (INPATIENT)
Facility: CLINIC | Age: 78
LOS: 5 days | Discharge: NURSING, REHAB OR LONG-TERM CARE | DRG: 470 | End: 2018-07-30
Attending: EMERGENCY MEDICINE | Admitting: SURGERY
Payer: MEDICARE

## 2018-07-25 ENCOUNTER — APPOINTMENT (OUTPATIENT)
Dept: GENERAL RADIOLOGY | Facility: CLINIC | Age: 78
DRG: 470 | End: 2018-07-25
Attending: EMERGENCY MEDICINE
Payer: MEDICARE

## 2018-07-25 ENCOUNTER — TELEPHONE (OUTPATIENT)
Dept: FAMILY MEDICINE | Facility: CLINIC | Age: 78
End: 2018-07-25

## 2018-07-25 ENCOUNTER — ANESTHESIA EVENT (OUTPATIENT)
Dept: SURGERY | Facility: CLINIC | Age: 78
DRG: 470 | End: 2018-07-25
Payer: MEDICARE

## 2018-07-25 ENCOUNTER — APPOINTMENT (OUTPATIENT)
Dept: ULTRASOUND IMAGING | Facility: CLINIC | Age: 78
DRG: 470 | End: 2018-07-25
Attending: SURGERY
Payer: MEDICARE

## 2018-07-25 DIAGNOSIS — M80.00XA AGE-RELATED OSTEOPOROSIS WITH CURRENT PATHOLOGICAL FRACTURE, INITIAL ENCOUNTER: ICD-10-CM

## 2018-07-25 DIAGNOSIS — S72.001A: ICD-10-CM
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
## 2018-07-25 DIAGNOSIS — S72.001A CLOSED FRACTURE OF NECK OF RIGHT FEMUR, INITIAL ENCOUNTER (H): ICD-10-CM

## 2018-07-25 DIAGNOSIS — A04.72 C. DIFFICILE COLITIS: Primary | ICD-10-CM

## 2018-07-25 DIAGNOSIS — S31.809D WOUND OF BUTTOCK, UNSPECIFIED LATERALITY, SUBSEQUENT ENCOUNTER: ICD-10-CM

## 2018-07-25 PROBLEM — S72.009A FEMORAL NECK FRACTURE (H): Status: ACTIVE | Noted: 2018-07-25

## 2018-07-25 LAB
ABO + RH BLD: NORMAL
ABO + RH BLD: NORMAL
ALBUMIN SERPL-MCNC: 3.2 G/DL (ref 3.4–5)
ALP SERPL-CCNC: 126 U/L (ref 40–150)
ALT SERPL W P-5'-P-CCNC: 19 U/L (ref 0–50)
ANION GAP SERPL CALCULATED.3IONS-SCNC: 6 MMOL/L (ref 3–14)
AST SERPL W P-5'-P-CCNC: 18 U/L (ref 0–45)
BASOPHILS # BLD AUTO: 0 10E9/L (ref 0–0.2)
BASOPHILS NFR BLD AUTO: 0.3 %
BILIRUB SERPL-MCNC: 0.4 MG/DL (ref 0.2–1.3)
BLD GP AB SCN SERPL QL: NORMAL
BLOOD BANK CMNT PATIENT-IMP: NORMAL
BUN SERPL-MCNC: 19 MG/DL (ref 7–30)
CALCIUM SERPL-MCNC: 9.5 MG/DL (ref 8.5–10.1)
CHLORIDE SERPL-SCNC: 103 MMOL/L (ref 94–109)
CO2 SERPL-SCNC: 29 MMOL/L (ref 20–32)
CREAT SERPL-MCNC: 0.62 MG/DL (ref 0.52–1.04)
DIFFERENTIAL METHOD BLD: NORMAL
EOSINOPHIL # BLD AUTO: 0.1 10E9/L (ref 0–0.7)
EOSINOPHIL NFR BLD AUTO: 0.8 %
ERYTHROCYTE [DISTWIDTH] IN BLOOD BY AUTOMATED COUNT: 14 % (ref 10–15)
ERYTHROCYTE [DISTWIDTH] IN BLOOD BY AUTOMATED COUNT: 14 % (ref 10–15)
GFR SERPL CREATININE-BSD FRML MDRD: >90 ML/MIN/1.7M2
GLUCOSE SERPL-MCNC: 83 MG/DL (ref 70–99)
HCT VFR BLD AUTO: 35.9 % (ref 35–47)
HCT VFR BLD AUTO: 37.2 % (ref 35–47)
HGB BLD-MCNC: 11.5 G/DL (ref 11.7–15.7)
HGB BLD-MCNC: 12 G/DL (ref 11.7–15.7)
IMM GRANULOCYTES # BLD: 0 10E9/L (ref 0–0.4)
IMM GRANULOCYTES NFR BLD: 0.2 %
INR PPP: 1.05 (ref 0.86–1.14)
LYMPHOCYTES # BLD AUTO: 0.9 10E9/L (ref 0.8–5.3)
LYMPHOCYTES NFR BLD AUTO: 14.7 %
MCH RBC QN AUTO: 28 PG (ref 26.5–33)
MCH RBC QN AUTO: 28.3 PG (ref 26.5–33)
MCHC RBC AUTO-ENTMCNC: 32 G/DL (ref 31.5–36.5)
MCHC RBC AUTO-ENTMCNC: 32.3 G/DL (ref 31.5–36.5)
MCV RBC AUTO: 87 FL (ref 78–100)
MCV RBC AUTO: 88 FL (ref 78–100)
MONOCYTES # BLD AUTO: 0.3 10E9/L (ref 0–1.3)
MONOCYTES NFR BLD AUTO: 5.4 %
NEUTROPHILS # BLD AUTO: 5 10E9/L (ref 1.6–8.3)
NEUTROPHILS NFR BLD AUTO: 78.6 %
NRBC # BLD AUTO: 0 10*3/UL
NRBC BLD AUTO-RTO: 0 /100
PLATELET # BLD AUTO: 351 10E9/L (ref 150–450)
PLATELET # BLD AUTO: 371 10E9/L (ref 150–450)
POTASSIUM SERPL-SCNC: 3.9 MMOL/L (ref 3.4–5.3)
PROT SERPL-MCNC: 7.4 G/DL (ref 6.8–8.8)
RBC # BLD AUTO: 4.11 10E12/L (ref 3.8–5.2)
RBC # BLD AUTO: 4.24 10E12/L (ref 3.8–5.2)
SODIUM SERPL-SCNC: 138 MMOL/L (ref 133–144)
SPECIMEN EXP DATE BLD: NORMAL
WBC # BLD AUTO: 5.7 10E9/L (ref 4–11)
WBC # BLD AUTO: 6.3 10E9/L (ref 4–11)

## 2018-07-25 PROCEDURE — 12000008 ZZH R&B INTERMEDIATE UMMC

## 2018-07-25 PROCEDURE — 25000128 H RX IP 250 OP 636: Performed by: EMERGENCY MEDICINE

## 2018-07-25 PROCEDURE — 96361 HYDRATE IV INFUSION ADD-ON: CPT | Performed by: EMERGENCY MEDICINE

## 2018-07-25 PROCEDURE — 99285 EMERGENCY DEPT VISIT HI MDM: CPT | Mod: Z6 | Performed by: EMERGENCY MEDICINE

## 2018-07-25 PROCEDURE — 71045 X-RAY EXAM CHEST 1 VIEW: CPT

## 2018-07-25 PROCEDURE — 85027 COMPLETE CBC AUTOMATED: CPT | Performed by: SURGERY

## 2018-07-25 PROCEDURE — 96372 THER/PROPH/DIAG INJ SC/IM: CPT | Performed by: EMERGENCY MEDICINE

## 2018-07-25 PROCEDURE — 73552 X-RAY EXAM OF FEMUR 2/>: CPT | Mod: RT

## 2018-07-25 PROCEDURE — 93970 EXTREMITY STUDY: CPT

## 2018-07-25 PROCEDURE — 85025 COMPLETE CBC W/AUTO DIFF WBC: CPT | Performed by: EMERGENCY MEDICINE

## 2018-07-25 PROCEDURE — 25000128 H RX IP 250 OP 636: Performed by: SURGERY

## 2018-07-25 PROCEDURE — 85610 PROTHROMBIN TIME: CPT | Performed by: EMERGENCY MEDICINE

## 2018-07-25 PROCEDURE — 86901 BLOOD TYPING SEROLOGIC RH(D): CPT | Performed by: EMERGENCY MEDICINE

## 2018-07-25 PROCEDURE — 86850 RBC ANTIBODY SCREEN: CPT | Performed by: EMERGENCY MEDICINE

## 2018-07-25 PROCEDURE — 80053 COMPREHEN METABOLIC PANEL: CPT | Performed by: EMERGENCY MEDICINE

## 2018-07-25 PROCEDURE — 86900 BLOOD TYPING SEROLOGIC ABO: CPT | Performed by: EMERGENCY MEDICINE

## 2018-07-25 PROCEDURE — 93005 ELECTROCARDIOGRAM TRACING: CPT | Performed by: EMERGENCY MEDICINE

## 2018-07-25 PROCEDURE — 96374 THER/PROPH/DIAG INJ IV PUSH: CPT | Performed by: EMERGENCY MEDICINE

## 2018-07-25 PROCEDURE — 68200002 ZZH TRAUMA EVALUATION W/O CC LEVEL II: Performed by: EMERGENCY MEDICINE

## 2018-07-25 PROCEDURE — 99285 EMERGENCY DEPT VISIT HI MDM: CPT | Mod: 25 | Performed by: EMERGENCY MEDICINE

## 2018-07-25 PROCEDURE — 73502 X-RAY EXAM HIP UNI 2-3 VIEWS: CPT

## 2018-07-25 RX ORDER — METHOCARBAMOL 500 MG/1
500 TABLET, FILM COATED ORAL 2 TIMES DAILY PRN
Status: DISCONTINUED | OUTPATIENT
Start: 2018-07-25 | End: 2018-07-30 | Stop reason: HOSPADM

## 2018-07-25 RX ORDER — LIDOCAINE 4 G/G
1 PATCH TOPICAL
Status: DISCONTINUED | OUTPATIENT
Start: 2018-07-26 | End: 2018-07-27

## 2018-07-25 RX ORDER — POLYETHYLENE GLYCOL 3350 17 G/17G
17 POWDER, FOR SOLUTION ORAL DAILY PRN
Status: DISCONTINUED | OUTPATIENT
Start: 2018-07-25 | End: 2018-07-30 | Stop reason: HOSPADM

## 2018-07-25 RX ORDER — MAGNESIUM SULFATE HEPTAHYDRATE 40 MG/ML
2 INJECTION, SOLUTION INTRAVENOUS DAILY PRN
Status: DISCONTINUED | OUTPATIENT
Start: 2018-07-25 | End: 2018-07-30 | Stop reason: HOSPADM

## 2018-07-25 RX ORDER — HYDROMORPHONE HYDROCHLORIDE 1 MG/ML
0.5 INJECTION, SOLUTION INTRAMUSCULAR; INTRAVENOUS; SUBCUTANEOUS ONCE
Status: COMPLETED | OUTPATIENT
Start: 2018-07-25 | End: 2018-07-25

## 2018-07-25 RX ORDER — ONDANSETRON 4 MG/1
4 TABLET, ORALLY DISINTEGRATING ORAL EVERY 6 HOURS PRN
Status: DISCONTINUED | OUTPATIENT
Start: 2018-07-25 | End: 2018-07-30 | Stop reason: HOSPADM

## 2018-07-25 RX ORDER — AMOXICILLIN 250 MG
1-2 CAPSULE ORAL 2 TIMES DAILY
Status: DISCONTINUED | OUTPATIENT
Start: 2018-07-26 | End: 2018-07-27

## 2018-07-25 RX ORDER — MAGNESIUM SULFATE HEPTAHYDRATE 40 MG/ML
4 INJECTION, SOLUTION INTRAVENOUS EVERY 4 HOURS PRN
Status: DISCONTINUED | OUTPATIENT
Start: 2018-07-25 | End: 2018-07-30 | Stop reason: HOSPADM

## 2018-07-25 RX ORDER — ONDANSETRON 2 MG/ML
4 INJECTION INTRAMUSCULAR; INTRAVENOUS EVERY 6 HOURS PRN
Status: DISCONTINUED | OUTPATIENT
Start: 2018-07-25 | End: 2018-07-30 | Stop reason: HOSPADM

## 2018-07-25 RX ORDER — POTASSIUM CHLORIDE 29.8 MG/ML
20 INJECTION INTRAVENOUS
Status: DISCONTINUED | OUTPATIENT
Start: 2018-07-25 | End: 2018-07-30 | Stop reason: HOSPADM

## 2018-07-25 RX ORDER — BISACODYL 10 MG
10 SUPPOSITORY, RECTAL RECTAL DAILY PRN
Status: DISCONTINUED | OUTPATIENT
Start: 2018-07-25 | End: 2018-07-30 | Stop reason: HOSPADM

## 2018-07-25 RX ORDER — MENTHOL AND ZINC OXIDE .44; 20.625 G/100G; G/100G
OINTMENT TOPICAL 3 TIMES DAILY
Status: DISCONTINUED | OUTPATIENT
Start: 2018-07-26 | End: 2018-07-30 | Stop reason: HOSPADM

## 2018-07-25 RX ORDER — SODIUM CHLORIDE, SODIUM LACTATE, POTASSIUM CHLORIDE, CALCIUM CHLORIDE 600; 310; 30; 20 MG/100ML; MG/100ML; MG/100ML; MG/100ML
1000 INJECTION, SOLUTION INTRAVENOUS CONTINUOUS
Status: DISCONTINUED | OUTPATIENT
Start: 2018-07-26 | End: 2018-07-27

## 2018-07-25 RX ORDER — HYDROMORPHONE HCL/0.9% NACL/PF 0.2MG/0.2
0.2 SYRINGE (ML) INTRAVENOUS
Status: DISCONTINUED | OUTPATIENT
Start: 2018-07-25 | End: 2018-07-30 | Stop reason: HOSPADM

## 2018-07-25 RX ORDER — POTASSIUM CL/LIDO/0.9 % NACL 10MEQ/0.1L
10 INTRAVENOUS SOLUTION, PIGGYBACK (ML) INTRAVENOUS
Status: DISCONTINUED | OUTPATIENT
Start: 2018-07-25 | End: 2018-07-30 | Stop reason: HOSPADM

## 2018-07-25 RX ORDER — ACETAMINOPHEN 500 MG
1000 TABLET ORAL EVERY 8 HOURS
Status: DISCONTINUED | OUTPATIENT
Start: 2018-07-26 | End: 2018-07-30 | Stop reason: HOSPADM

## 2018-07-25 RX ORDER — FLUORIDE TOOTHPASTE
5-10 TOOTHPASTE DENTAL 4 TIMES DAILY PRN
Status: DISCONTINUED | OUTPATIENT
Start: 2018-07-25 | End: 2018-07-30 | Stop reason: HOSPADM

## 2018-07-25 RX ORDER — POTASSIUM CHLORIDE 7.45 MG/ML
10 INJECTION INTRAVENOUS
Status: DISCONTINUED | OUTPATIENT
Start: 2018-07-25 | End: 2018-07-30 | Stop reason: HOSPADM

## 2018-07-25 RX ORDER — POTASSIUM CHLORIDE 750 MG/1
20-40 TABLET, EXTENDED RELEASE ORAL
Status: DISCONTINUED | OUTPATIENT
Start: 2018-07-25 | End: 2018-07-30 | Stop reason: HOSPADM

## 2018-07-25 RX ORDER — NALOXONE HYDROCHLORIDE 0.4 MG/ML
.1-.4 INJECTION, SOLUTION INTRAMUSCULAR; INTRAVENOUS; SUBCUTANEOUS
Status: DISCONTINUED | OUTPATIENT
Start: 2018-07-25 | End: 2018-07-30 | Stop reason: HOSPADM

## 2018-07-25 RX ORDER — SODIUM CHLORIDE, SODIUM LACTATE, POTASSIUM CHLORIDE, CALCIUM CHLORIDE 600; 310; 30; 20 MG/100ML; MG/100ML; MG/100ML; MG/100ML
INJECTION, SOLUTION INTRAVENOUS ONCE
Status: COMPLETED | OUTPATIENT
Start: 2018-07-25 | End: 2018-07-25

## 2018-07-25 RX ORDER — ZINC OXIDE
OINTMENT (GRAM) TOPICAL 2 TIMES DAILY
Status: DISCONTINUED | OUTPATIENT
Start: 2018-07-25 | End: 2018-07-30 | Stop reason: HOSPADM

## 2018-07-25 RX ORDER — MULTIVITAMIN,THERAPEUTIC
1 TABLET ORAL DAILY
Status: DISCONTINUED | OUTPATIENT
Start: 2018-07-26 | End: 2018-07-30 | Stop reason: HOSPADM

## 2018-07-25 RX ORDER — POTASSIUM CHLORIDE 1.5 G/1.58G
20-40 POWDER, FOR SOLUTION ORAL
Status: DISCONTINUED | OUTPATIENT
Start: 2018-07-25 | End: 2018-07-30 | Stop reason: HOSPADM

## 2018-07-25 RX ADMIN — Medication 0.5 MG: at 22:30

## 2018-07-25 RX ADMIN — SODIUM CHLORIDE, POTASSIUM CHLORIDE, SODIUM LACTATE AND CALCIUM CHLORIDE: 600; 310; 30; 20 INJECTION, SOLUTION INTRAVENOUS at 21:29

## 2018-07-25 RX ADMIN — ENOXAPARIN SODIUM 30 MG: 30 INJECTION SUBCUTANEOUS at 21:29

## 2018-07-25 ASSESSMENT — ACTIVITIES OF DAILY LIVING (ADL)
COMMUNICATION: 0 - UNDERSTANDS/COMMUNICATES WITHOUT DIFFICULTY
DRESS: 2 - ASSISTIVE PERSON
AMBULATION: 0-->INDEPENDENT
TRANSFERRING: 2 - ASSISTIVE PERSON
RETIRED_COMMUNICATION: 0-->UNDERSTANDS/COMMUNICATES WITHOUT DIFFICULTY
RETIRED_EATING: 0-->INDEPENDENT
BATHING: 0-->INDEPENDENT
FALL_HISTORY_WITHIN_LAST_SIX_MONTHS: YES
WHICH_OF_THE_ABOVE_FUNCTIONAL_RISKS_HAD_A_RECENT_ONSET_OR_CHANGE?: FALL HISTORY;AMBULATION;TRANSFERRING
AMBULATION: 2 - ASSISTIVE PERSON
TOILETING: 2 - ASSISTIVE PERSON
COGNITION: 0 - NO COGNITION ISSUES REPORTED
BATHING: 2 - ASSISTIVE PERSON
SWALLOWING: 0 - SWALLOWS FOODS/LIQUIDS WITHOUT DIFFICULTY
SWALLOWING: 0-->SWALLOWS FOODS/LIQUIDS WITHOUT DIFFICULTY
NUMBER_OF_TIMES_PATIENT_HAS_FALLEN_WITHIN_LAST_SIX_MONTHS: 4
EATING: 0 - INDEPENDENT
DRESS: 0-->INDEPENDENT
TOILETING: 0-->INDEPENDENT
TRANSFERRING: 0-->INDEPENDENT

## 2018-07-25 ASSESSMENT — ENCOUNTER SYMPTOMS
SHORTNESS OF BREATH: 0
ABDOMINAL PAIN: 0
FEVER: 0
JOINT SWELLING: 1
WOUND: 0

## 2018-07-25 ASSESSMENT — VISUAL ACUITY: OU: GLASSES

## 2018-07-25 NOTE — TELEPHONE ENCOUNTER
Homecare nurse calling in [facility]    PATIENT COMPLAINS OF :  -10/10 pain in R hip/ buttock area. Recent chemo. Difficulty ambulating. Duration of 10 days. Taking Tramadol q4h for pain, not helping. R foot pitting edema +2. Nurse thinks it may be a fx?    PLAN :   -Advised to bring patient into the Emergency Department for further evaluation given history and persistent, uncontrolled pain.

## 2018-07-25 NOTE — H&P
[facility]    History and Physical: Trauma Service       Date of Admission:  7/25/2018    Time of Admission/Consult Request (page/call): 6:05    Time of my evaluation: 6:30  Consulting services:  Orthopedics - Emergent consult (within 30 mins): Called by ED    Assessment & Plan   Trauma mechanism: Mechanical Fall  Time/date of injury: 2 weeks ago  Known Injuries:  1. Right femoral neck fracture  Other diagnoses:   1. Anal cancer, s/p chemoradiation    Plan:  1. Admit to Trauma service, attending Dr. [facility]  2. Orthopedics consult for evaluation of femoral neck fracture --planning likely OR tomorrow  3. Pain control with scheduled tylenol, lidoderm patch, oxycodone, IV dilaudid for breakthrough  4. US LE to assess for DVT with swelling  5. Preop workup including EKG and CXR done in ED, no ST changes but notable for irregular rhythm, CXR with chronic rib fracture and pleural effusions  6. NPO after midnight  7. Frequent turning to avoid pressure injury  8. PT/OT consult    Cleared for surgery from a trauma standpoint, ok to go to OR with orthopedics      Code status: Full code confirmed with patient in presence of sister and brother in law    General Cares:  GI Prophylaxis: n/a  DVT Prophylaxis: SCDs, lovenox   Date of last stool/Bowel Regimen: yesterday  Pulmonary toilet:IS/ deep breathing cough    ETOH: This patient was asked if in the last 3-6 months there has been a time when she had 4 or more drinks in a single day/outing.. Patient answer to the screening question was in the negative. No intervention needed.  Primary Care Physician   Felisha Davis    Chief Complaint   Mechanical fall, R hip pain with inability to weight bear    History is obtained from the patient    History of Present Illness   Elizabeth Taylor is a 78 year old female who presents to the ED for severe right hip pain.  She comes in after a fall at home two weeks ago.  She was walking at her home and  fell, she felt immediate severe stabbing pain in her R hip.  She was not able to ambulate on this. She was able to crawl down the stairs to find her phone and get in bed.  Her daughter came over to help her, but if she wasn't moving it didn't hurt, she though she pulled a muscle.  She was able to stand and bear a tiny bit of weight on the leg.  But the pain kept getting worse and so she presented to the hospital today.  She denies any other injuries or other pain.  She has no nausea.  Occasionally has some fullness in her abdomen.  She has a history of stage III anal cancer which was treated with chemoradiation which finished in March.  SHe has a previous fall in March with rib fractures and hemopneumothorax.  She has since been recovering at her family's.  Prior to this fall she was ambulatory but weak and debilitated from chemo/rads.      Past Medical History    I have reviewed this patient's medical history and updated it with pertinent information if needed.   Past Medical History:   Diagnosis Date     Anal cancer (H)      Endometriosis, site unspecified      Thyroiditis, unspecified     Thryoiditis-resolved       Past Surgical History   I have reviewed this patient's surgical history and updated it with pertinent information if needed.  Past Surgical History:   Procedure Laterality Date     APPENDECTOMY      as a child     COLONOSCOPY N/A 4/13/2017    Procedure: COLONOSCOPY;  Surgeon: Juan Dos Santos MD;  Location: [facility]     INSERT PORT VASCULAR ACCESS Right 2/8/2018    Procedure: INSERT PORT VASCULAR ACCESS;  Place Single Lumen Venous Chest Port Placement;  Surgeon: Zaire Moreira PA-C;  Location: [facility] OR     SURGICAL HISTORY OF -       endometriosis     Prior to Admission Medications   Prior to Admission Medications   Prescriptions Last Dose Informant Patient Reported? Taking?   Hydrocortisone (BUTT PASTE, WITH H.C,)   No No   Sig: Use topically 3-4 times daily   acetaminophen (TYLENOL) 325  MG tablet   No No   Sig: Take 2 tablets (650 mg) by mouth every 4 hours as needed for pain   artificial saliva (BIOTENE DRY MOUTHWASH) LIQD liquid   No No   Sig: Swish and spit 5-10 mLs in mouth 4 times daily as needed for dry mouth   Patient not taking: Reported on 6/22/2018   calcium-vitamin D (CALTRATE) 600-400 MG-UNIT per tablet   No No   Sig: Take 1 tablet by mouth daily   Patient not taking: Reported on 6/22/2018   diphenoxylate-atropine (LOMOTIL) 2.5-0.025 MG per tablet   No No   Sig: Take 1 tablet by mouth 4 times daily as needed for diarrhea . If having less than two bowel movements daily, DO NOT GIVE   Patient not taking: Reported on 6/22/2018   loperamide (LOPERAMIDE A-D) 2 MG tablet   No No   Sig: Take 1 tablet (2 mg) by mouth 4 times daily as needed for diarrhea . If having less than two bowel movements daily, DO NOT GIVE   Patient not taking: Reported on 6/22/2018   menthol-zinc oxide (CALMOSEPTINE) 0.44-20.625 % OINT ointment   No No   Sig: Apply thick layer to irritated perianal skin 4 times per day and after bowel movements.   methocarbamol (ROBAXIN) 500 MG tablet   No No   Sig: Take 1 tablet (500 mg) by mouth 2 times daily as needed for muscle spasms   multivitamin, therapeutic (THERA-VIT) TABS tablet   No No   Sig: Take 1 tablet by mouth daily   ondansetron (ZOFRAN-ODT) 8 MG ODT tab   No No   Sig: Take 1 tablet (8 mg) by mouth every 8 hours as needed For nausea   Patient not taking: Reported on 6/22/2018   simethicone (MYLICON) 40 MG/0.6ML suspension   No No   Sig: Take 0.6 mLs (40 mg) by mouth every 6 hours as needed for cramping   Patient not taking: Reported on 6/22/2018   traMADol (ULTRAM) 50 MG tablet   No No   Sig: Take 1 tablet (50 mg) by mouth every 6 hours   vitamin B complex with vitamin C (VITAMIN  B COMPLEX) TABS tablet   Yes No   Sig: Take 1 tablet by mouth daily      Facility-Administered Medications: None     Allergies   Allergies   Allergen Reactions     Darvon [Propoxyphene]       Had reaction in teen years     Penicillins      As a child     Ketoconazole Rash       Social History   Social History     Social History     Marital status: Single     Spouse name: N/A     Number of children: N/A     Years of education: N/A     Occupational History     Not on file.     Social History Main Topics     Smoking status: Never Smoker     Smokeless tobacco: Never Used     Alcohol use No     Drug use: No     Sexual activity: No     Other Topics Concern     Not on file     Social History Narrative       Family History   I have reviewed this patient's family history and updated it with pertinent information if needed.   Family History   Problem Relation Age of Onset     Cancer Sister      Cancer Maternal Grandmother      lymphoma     HEART DISEASE Father      MI     Uterine Cancer Niece        Review of Systems   CONSTITUTIONAL: No fever, chills, sweats, fatigue   EYES: no visual blurring, no double vision or visual loss  ENT: no decrease in hearing, no tinnitus, no vertigo, no hoarseness  RESPIRATORY: no shortness of breath, no cough, no sputum   CARDIOVASCULAR: no palpitations, no chest  pain, no exertional chest pain or pressure  GASTROINTESTINAL: no nausea or vomiting, or abd pain  GENITOURINARY: no dysuria, no frequency or hesitancy, no hematuria  MUSCULOSKELETAL: no weakness, no redness, no swelling, no joint pain,   SKIN: no rashes, ecchymoses, abrasions or lacerations  NEUROLOGIC: no numbness or tingling of hands, no numbness or tingling  of feet, no syncope, no tremors or weakness  PSYCHIATRIC: no sleep disturbances, no anxiety or depression    Physical Exam   Temp: 98.1  F (36.7  C) Temp src: Oral BP: 133/84   Heart Rate: 90 Resp: 16 SpO2: 100 % O2 Device: None (Room air)    Vital Signs with Ranges  Temp:  [98.1  F (36.7  C)] 98.1  F (36.7  C)  Heart Rate:  [90] 90  Resp:  [16] 16  BP: (133)/(84) 133/84  SpO2:  [97 %-100 %] 100 % 0 lbs 0 oz    Primary Survey:  Airway: patient talking  Breathing:  symmetric respiratory effort bilaterally  Circulation: central pulses present and peripheral pulses present  Disability: Pupils - left 4 mm and brisk, right 4 mm and brisk     Rich[place] Coma Scale - Total 15/15  Eye Response (E): 4  4= spontaneous,  3= to verbal/voice, 2=  to pain, 1= No response   Verbal Response (V): 5   5= Orientated, converses,  4= Confused, converses, 3= Inappropriate words,  2= Incomprehensible sounds,  1=No response   Motor Response (M): 6   6= Obeys commands, 5= Localizes to pain, 4= Withdrawal to pain, 3=Fexion to pain, 2= Extension to pain, 1= No response    Secondary Survey:  General: alert, oriented to person, place, time  Head: atraumatic, normocephalic, trachea midline  Eyes: PERRLA, pupils 3mm, EOMI, corneas and conjunctivae clear  Ears: pearly grey bilateral TMs and non-inflamed external ear canals  Nose: nares patent, no drainage, nasal septum non-tender  Mouth/Throat: no exudates or erythema,  no dental tenderness or malocclusions, no tongue lacerations  Neck:  No cervical collar present. No midline posterior tenderness, full AROM without pain or tenderness   Chest/Pulmonary: normal respiratory rate and rhythm,  clear breath sounds BL but decreased at bases, no wheezes, rales or rhonchi, no chest wall tenderness or deformities,   Cardiovascular: S1, S2,  normal and regular rate and rhythm, no murmurs  Abdomen: soft, non-tender, no guarding, no rebound tenderness and no tenderness to palpation  : normal external genitalia, pelvis stable to lateral compression  no farias,   Back/Spine: no deformity, no midline tenderness, no sacral tenderness,  no step-offs and no abrasions or contusions  Musculoskel/Extremities: RLE with swelling of the hip joint, and ankle.  Some mild erythema of the foot, not blanching.  Does not appear cellulitis. No pain at knee joint or long bones.  TTP over right hip.  OTherwise normal extremities, full AROM of major joints without tenderness, edema, erythema,  ecchymosis, or abrasions.    Hand: no gross deformities of hands or fingers. Full AROM of hand and fingers in flexion and extension.  strength equal and symmetric.   Skin: no rashes, laceration, ecchymosis, skin warm and dry.   Neuro: PERRLA, alert, oriented x 4. CN II-XII grossly intact. No focal deficits. Strength 5/5 x 3 extremities.  Right leg with intact strength at ankle, other joints not tested due to pain.  Sensation intact.  Psychiatric: affect/mood normal, cooperative, normal judgement/insight and memory intact  # Pain Assessment:  Current Pain Score 7/25/2018   Patient currently in pain? yes   Pain score (0-10) -   Pain location Right hip   Pain descriptors Discomfort   - Elizabeth is experiencing pain due to right femoral neck fracture Pain management was discussed with Elizabeth and her sister and the plan was created in a collaborative fashion.  Elizabeth's response to the current recommendations: engaged  - Please see the plan for pain management as documented above      Data   UA RESULTS:  Recent Labs   Lab Test  04/02/18   1200   COLOR  Yellow   APPEARANCE  Cloudy   URINEGLC  Negative   URINEBILI  Negative   URINEKETONE  Negative   SG  1.020   UBLD  Small*   URINEPH  5.5   PROTEIN  30*   NITRITE  Negative   LEUKEST  Large*   RBCU  27*   WBCU  40*     I personally reviewed the EKG tracing showing Sinus rhythm with frequent PACs.  .  Results for orders placed or performed during the hospital encounter of 07/25/18 (from the past 24 hour(s))   XR Pelvis w Hip Right G/E 2 Views    Narrative    Exam:  XR PELVIS AND HIP RIGHT 2 VIEWS, 7/25/2018 5:52 PM    History: Trauma;     Comparison:  Pelvis MRI 5/25/2018, PET/CT 5/25/2018.    Findings:  AP and crosstable lateral views of the right hip.  Displaced, foreshortened right femoral neck fracture. Remaining  osseous structures are intact. Low lumbar predominant facet  arthropathy and mild degenerative changes in the left hip. Visualized  soft tissues are  unremarkable.      Impression    Impression:    Displaced, foreshortened right femoral neck fracture.    I have personally reviewed the examination and initial interpretation  and I agree with the findings.    SHARON PAREKH MD   Comprehensive metabolic panel   Result Value Ref Range    Sodium 138 133 - 144 mmol/L    Potassium 3.9 3.4 - 5.3 mmol/L    Chloride 103 94 - 109 mmol/L    Carbon Dioxide 29 20 - 32 mmol/L    Anion Gap 6 3 - 14 mmol/L    Glucose 83 70 - 99 mg/dL    Urea Nitrogen 19 7 - 30 mg/dL    Creatinine 0.62 0.52 - 1.04 mg/dL    GFR Estimate >90 >60 mL/min/1.7m2    GFR Estimate If Black >90 >60 mL/min/1.7m2    Calcium 9.5 8.5 - 10.1 mg/dL    Bilirubin Total 0.4 0.2 - 1.3 mg/dL    Albumin 3.2 (L) 3.4 - 5.0 g/dL    Protein Total 7.4 6.8 - 8.8 g/dL    Alkaline Phosphatase 126 40 - 150 U/L    ALT 19 0 - 50 U/L    AST 18 0 - 45 U/L   INR   Result Value Ref Range    INR 1.05 0.86 - 1.14   ABO/Rh type and screen   Result Value Ref Range    ABO PENDING     Antibody Screen PENDING     Test Valid Only At          [facility]    Specimen Expires 07/28/2018    EKG 12 lead   Result Value Ref Range    Interpretation ECG Click View Image link to view waveform and result    XR Chest 1 View    Impression    IMPRESSION:   1. Subacute right-sided rib fractures with no acute displaced fracture  identified.  2. Bilateral trace pleural effusions.   XR Femur Right 2 Views    Narrative    XR FEMUR RT 2 VW  7/25/2018 6:45 PM      HISTORY: trauma;     COMPARISON: Earlier today    FINDINGS: Redemonstration of right femoral neck fracture with superior  displacement of the distal fragment. No other fractures identified.      Impression    IMPRESSION: Redemonstration of right femoral neck fracture with  superior displacement of the distal fragment.        Studies:  XR Pelvis w Hip Right G/E 2 Views   Final Result   Impression:     Displaced, foreshortened right femoral neck  fracture.      I have personally reviewed the examination and initial interpretation   and I agree with the findings.      SHARON PAREKH MD      XR Femur Right 2 Views    (Results Pending)   XR Chest 1 View    (Results Pending)       Quin Jackson

## 2018-07-25 NOTE — IP AVS SNAPSHOT
MRN:[id number]                      After Visit Summary   7/25/2018    Elizabeth Taylor    MRN: [id number]           Thank you!     Thank you for choosing [facility] for your care. Our goal is always to provide you with excellent care. Hearing back from our patients is one way we can continue to improve our services. Please take a few minutes to complete the written survey that you may receive in the mail after you visit with us. Thank you!        Patient Information     Date Of Birth          1940        About your hospital stay     You were admitted on:  July 25, 2018 You last received care in the:  [address] [facility]    You were discharged on:  July 30, 2018        Reason for your hospital stay       You were hospitalized and treated for the following conditions:  1. Right femoral neck fracture    You were seen by the following services:  Trauma Service  Orthopedics  Physical and Occupational therapies                  Who to Call     For medical emergencies, please call 911.  For non-urgent questions about your medical care, please call your primary care provider or clinic, [phone]  For questions related to your surgery, please call your surgery clinic        Attending Provider     Provider Specialty    Aakash Gutierrez MD Emergency Medicine    BeilSwink, Aakash Lira MD Transplant       Primary Care Provider Office Phone # Fax #    Baron Seth [phone]      After Care Instructions     Activity - Up with nursing assistance           Additional Discharge Instructions           Advance Diet as Tolerated       Follow this diet upon discharge: Regular diet   Encourage PO intake            Daily weights       Call Provider for weight gain of more than 2 pounds per day or 5 pounds per week.            Encourage PO fluids           Fall precautions           General info for SNF       Length of Stay Estimate: Short Term Care: Estimated # of Days  <30  Condition at Discharge: Stable  Level of care:skilled   Rehabilitation Potential: Good  Admission H&P remains valid and up-to-date: Yes  Recent Chemotherapy: N/A  Use Nursing Home Standing Orders: N/A            Hip precautions       Avoid flexion greater than 90 degrees (avoid bending past 90 degrees)   Avoid internal rotation (twisting your legs in or out)  Avoid adduction past midline (avoid crossing your legs)  Avoid pivoting.  Keep a pillow (or abductor pillow) between your thighs to keep your knees from touching.            Intake and output       Every shift            Mantoux instructions       Give two-step Mantoux (PPD) Per Facility Policy Yes            Weight bearing status       Weightbearing as  Tolerated            Wound care       Site:   Perianal/rectal area   Instructions:   BID wound cares.                  Follow-up Appointments     Follow Up and recommended labs and tests       Follow up with your primary care provider for continued medical care and hospital follow up in 5-10 days.     Medication Therapy Management Services  If you have any questions regarding your medications after discharge, this service is available to you.  Please call:  [phone] or [phone] (toll-free)  [facility]  [address] [place] [address]  [email or web address]  [email or web address]    Trauma Clinic  [facility]  Floor 4  [address] [place] [address]   Appointments: [phone]    Orthopaedic Clinic in 2 weeks with Dr Gamez  [facility]  Floor 4   [address] [place] [address]   Appointments: [phone]                  Your next 10 appointments already scheduled     Aug 21, 2018 10:00 AM CDT   (Arrive by 9:45 AM)   Post-Op with Zaire Phan MD   [facility] ([facility] [place])    [address]  4th Floor  [facility] [address] [phone]          "   Aug 31, 2018 11:00 AM CDT   [facility] with  [facility]   [facility] ([facility])    [address]  [address]  [facility] [address]   [phone]            Aug 31, 2018 11:30 AM CDT   (Arrive by 11:15 AM)   [facility] Shen Ray MD   [facility]    [address]  [address]  [facility] [address] [phone]            Aug 31, 2018  1:00 PM CDT   Return Visit with Zaire Madison MD   Radiation Oncology Clinic ([facility])    General acute hospital  1st Floor  [address] [address] [phone]              Additional Services     Occupational Therapy Adult Consult       Evaluate and treat as clinically indicated.    Reason:   Right femoral neck fracture            Physical Therapy Adult Consult       Evaluate and treat as clinically indicated.    Reason:  Right femoral neck fracture                  Future tests that were ordered for you     Pneumatic Compression Device        Bilateral calf. Remove 30 mins BID.                  Pending Results     Date and Time Order Name Status Description    7/26/2018 1853 Fungus Culture, non-blood Preliminary     7/26/2018 1853 Anaerobic bacterial culture Preliminary             Statement of Approval     Ordered          07/30/18 1315  I have reviewed and agree with all the recommendations and orders detailed in this document.  EFFECTIVE NOW     Approved and electronically signed by:  Hugo Farrell NP             Admission Information     Date & Time Provider Department Dept. Phone    7/25/2018 Aakash Collins MD [address] [facility] [place] [phone]      Your Vitals Were     Blood Pressure Pulse Temperature Respirations Height Weight    121/67 (BP Location: Right arm) 90 96.9  F (36.1  C) (Axillary) 16 1.676 m (5' 6\") 51.9 kg (114 lb 8 oz)    Pulse Oximetry " BMI (Body Mass Index)                97% 18.48 kg/m2          [email or web address] Information     [employer] gives you secure access to your electronic health record. If you see a primary care provider, you can also send messages to your care team and make appointments. If you have questions, please call your primary care clinic.  If you do not have a primary care provider, please call [phone] and they will assist you.        Care EveryWhere ID     This is your Care EveryWhere ID. This could be used by other organizations to access your [facility] medical records  [id number]        Equal Access to Services     [facility]: Hadii chelo ledezma Somushtaq, waanada lumariselaadaha, qalenka kaalravinder gomes, cyndy walton . [facility] [phone].    ATENCIÓN: Si habla español, tiene a ghosh disposición servicios gratuitos de asistencia lingüística. Llame al [phone].    We comply with applicable federal civil rights laws and [place] laws. We do not discriminate on the basis of race, color, national origin, age, disability, sex, sexual orientation, or gender identity.               Review of your medicines      START taking        Dose / Directions    diclofenac 1.3 % Patch   Commonly known as:  FLECTOR        Dose:  1 patch   Place 1 patch onto the skin 2 times daily   Refills:  0       dimethicone 1.3 % Lotn lotion   Used for:  Wound of buttock, unspecified laterality, subsequent encounter        Externally apply topically 2 times daily   Refills:  0       enoxaparin 30 MG/0.3ML injection   Commonly known as:  LOVENOX   Used for:  Closed displaced fracture of neck of right femur (H)        Dose:  30 mg   Inject 0.3 mLs (30 mg) Subcutaneous every 24 hours for 28 days   Refills:  0       oxyCODONE IR 5 MG tablet   Commonly known as:  ROXICODONE   Used for:  Closed displaced fracture of neck of right femur (H)        Dose:  2.5-5 mg   Take 0.5-1 tablets (2.5-5 mg) by mouth every 3 hours as needed for moderate  to severe pain   Quantity:  20 tablet   Refills:  0       vancomycin 50 MG/ML Solr   Commonly known as:  FIRVANQ   Indication:  Clostridium difficile Bacteria, Community Acquired Pneumonia   Used for:  C. difficile colitis        Dose:  125 mg   Take 2.5 mLs (125 mg) by mouth 4 times daily for 10 days   Quantity:  105 mL   Refills:  0       zinc oxide 40 % ointment   Commonly known as:  DESITIN   Used for:  Wound of buttock, unspecified laterality, subsequent encounter   Replaces:  BUTT PASTE (WITH H.C)        Apply topically 2 times daily   Refills:  0         CONTINUE these medicines which may have CHANGED, or have new prescriptions. If we are uncertain of the size of tablets/capsules you have at home, strength may be listed as something that might have changed.        Dose / Directions    acetaminophen 500 MG tablet   Commonly known as:  TYLENOL   This may have changed:    - medication strength  - how much to take  - when to take this  - reasons to take this   Used for:  Closed displaced fracture of neck of right femur (H)        Dose:  1000 mg   Take 2 tablets (1,000 mg) by mouth every 8 hours for 5 days   Refills:  0         CONTINUE these medicines which have NOT CHANGED        Dose / Directions    artificial saliva Liqd liquid   Used for:  Mouth dryness        Dose:  5-10 mL   Swish and spit 5-10 mLs in mouth 4 times daily as needed for dry mouth   Refills:  0       calcium-vitamin D 600-400 MG-UNIT per tablet   Commonly known as:  CALTRATE   Used for:  Age-related osteoporosis with current pathological fracture, initial encounter        Dose:  1 tablet   Take 1 tablet by mouth daily   Quantity:  60 tablet   Refills:  0       menthol-zinc oxide 0.44-20.625 % Oint ointment   Commonly known as:  CALMOSEPTINE   Used for:  Malignant neoplasm of anal canal (H)        Apply thick layer to irritated perianal skin 4 times per day and after bowel movements.   Quantity:  1 Tube   Refills:  3       methocarbamol 500 MG  tablet   Commonly known as:  ROBAXIN   Used for:  Closed fracture of one rib of right side, initial encounter        Dose:  500 mg   Take 1 tablet (500 mg) by mouth 2 times daily as needed for muscle spasms   Quantity:  120 tablet   Refills:  0       multivitamin, therapeutic Tabs tablet   Used for:  Age-related osteoporosis with current pathological fracture, initial encounter        Dose:  1 tablet   Take 1 tablet by mouth daily   Refills:  0       simethicone 40 MG/0.6ML suspension   Commonly known as:  MYLICON   Used for:  Malignant neoplasm of anal canal (H), Flatulence, eructation and gas pain        Dose:  40 mg   Take 0.6 mLs (40 mg) by mouth every 6 hours as needed for cramping   Refills:  0       vitamin B complex with vitamin C Tabs tablet        Dose:  1 tablet   Take 1 tablet by mouth daily   Refills:  0         STOP taking     BUTT PASTE (WITH H.C)   Replaced by:  zinc oxide 40 % ointment           diphenoxylate-atropine 2.5-0.025 MG per tablet   Commonly known as:  LOMOTIL           loperamide 2 MG tablet   Commonly known as:  LOPERAMIDE A-D           ondansetron 8 MG ODT tab   Commonly known as:  ZOFRAN-ODT           traMADol 50 MG tablet   Commonly known as:  ULTRAM                Where to get your medicines      Some of these will need a paper prescription and others can be bought over the counter. Ask your nurse if you have questions.     You don't need a prescription for these medications     acetaminophen 500 MG tablet    diclofenac 1.3 % Patch    dimethicone 1.3 % Lotn lotion    enoxaparin 30 MG/0.3ML injection    multivitamin, therapeutic Tabs tablet    vancomycin 50 MG/ML Solr    zinc oxide 40 % ointment         Information about where to get these medications is not yet available     ! Ask your nurse or doctor about these medications     oxyCODONE IR 5 MG tablet                Protect others around you: Learn how to safely use, store and throw away your medicines at www.disposemymeds.org.         ANTIBIOTIC INSTRUCTION     You've Been Prescribed an Antibiotic - Now What?  Your healthcare team thinks that you or your loved one might have an infection. Some infections can be treated with antibiotics, which are powerful, life-saving drugs. Like all medications, antibiotics have side effects and should only be used when necessary. There are some important things you should know about your antibiotic treatment.      Your healthcare team may run tests before you start taking an antibiotic.    Your team may take samples (e.g., from your blood, urine or other areas) to run tests to look for bacteria. These test can be important to determine if you need an antibiotic at all and, if you do, which antibiotic will work best.      Within a few days, your healthcare team might change or even stop your antibiotic.    Your team may start you on an antibiotic while they are working to find out what is making you sick.    Your team might change your antibiotic because test results show that a different antibiotic would be better to treat your infection.    In some cases, once your team has more information, they learn that you do not need an antibiotic at all. They may find out that you don't have an infection, or that the antibiotic you're taking won't work against your infection. For example, an infection caused by a virus can't be treated with antibiotics. Staying on an antibiotic when you don't need it is more likely to be harmful than helpful.      You may experience side effects from your antibiotic.    Like all medications, antibiotics have side effects. Some of these can be serious.    Let you healthcare team know if you have any known allergies when you are admitted to the hospital.    One significant side effect of nearly all antibiotics is the risk of severe and sometimes deadly diarrhea caused by Clostridium difficile (C. Difficile). This occurs when a person takes antibiotics because some good germs are  destroyed. Antibiotic use allows C. diificile to take over, putting patients at high risk for this serious infection.    As a patient or caregiver, it is important to understand your or your loved one's antibiotic treatment. It is especially important for caregivers to speak up when patients can't speak for themselves. Here are some important questions to ask your healthcare team.    What infection is this antibiotic treating and how do you know I have that infection?    What side effects might occur from this antibiotic?    How long will I need to take this antibiotic?    Is it safe to take this antibiotic with other medications or supplements (e.g., vitamins) that I am taking?     Are there any special directions I need to know about taking this antibiotic? For example, should I take it with food?    How will I be monitored to know whether my infection is responding to the antibiotic?    What tests may help to make sure the right antibiotic is prescribed for me?      Information provided by:  www.cdc.gov/getsmart  U.S. Department of Health and Human Services  Centers for disease Control and Prevention  National Center for Emerging and Zoonotic Infectious Diseases  Division of Healthcare Quality Promotion        Information about OPIOIDS     PRESCRIPTION OPIOIDS: WHAT YOU NEED TO KNOW   We gave you an opioid (narcotic) pain medicine. It is important to manage your pain, but opioids are not always the best choice. You should first try all the other options your care team gave you. Take this medicine for as short a time (and as few doses) as possible.     These medicines have risks:    DO NOT drive when on new or higher doses of pain medicine. These medicines can affect your alertness and reaction times, and you could be arrested for driving under the influence (DUI). If you need to use opioids long-term, talk to your care team about driving.    DO NOT operate heave machinery    DO NOT do any other dangerous  activities while taking these medicines.     DO NOT drink any alcohol while taking these medicines.      If the opioid prescribed includes acetaminophen, DO NOT take with any other medicines that contain acetaminophen. Read all labels carefully. Look for the word  acetaminophen  or  Tylenol.  Ask your pharmacist if you have questions or are unsure.    You can get addicted to pain medicines, especially if you have a history of addiction (chemical, alcohol or substance dependence). Talk to your care team about ways to reduce this risk.    Store your pills in a secure place, locked if possible. We will not replace any lost or stolen medicine. If you don t finish your medicine, please throw away (dispose) as directed by your pharmacist. The [place] Pollution Control Agency has more information about safe disposal: https://www.pca.[facility].[place].us/living-green/managing-unwanted-medications.     All opioids tend to cause constipation. Drink plenty of water and eat foods that have a lot of fiber, such as fruits, vegetables, prune juice, apple juice and high-fiber cereal. Take a laxative (Miralax, milk of magnesia, Colace, Senna) if you don t move your bowels at least every other day.              Medication List: This is a list of all your medications and when to take them. Check marks below indicate your daily home schedule. Keep this list as a reference.      Medications           Morning Afternoon Evening Bedtime As Needed    acetaminophen 500 MG tablet   Commonly known as:  TYLENOL   Take 2 tablets (1,000 mg) by mouth every 8 hours for 5 days   Last time this was given:  1,000 mg on 7/30/2018  1:50 PM                                artificial saliva Liqd liquid   Swish and spit 5-10 mLs in mouth 4 times daily as needed for dry mouth                                calcium-vitamin D 600-400 MG-UNIT per tablet   Commonly known as:  CALTRATE   Take 1 tablet by mouth daily   Last time this was given:  1 tablet on 7/30/2018   7:37 AM                                diclofenac 1.3 % Patch   Commonly known as:  FLECTOR   Place 1 patch onto the skin 2 times daily   Last time this was given:  1 patch on 7/29/2018  9:01 PM                                dimethicone 1.3 % Lotn lotion   Externally apply topically 2 times daily   Last time this was given:  7/30/2018  7:40 AM                                enoxaparin 30 MG/0.3ML injection   Commonly known as:  LOVENOX   Inject 0.3 mLs (30 mg) Subcutaneous every 24 hours for 28 days   Last time this was given:  30 mg on 7/30/2018 10:30 AM                                menthol-zinc oxide 0.44-20.625 % Oint ointment   Commonly known as:  CALMOSEPTINE   Apply thick layer to irritated perianal skin 4 times per day and after bowel movements.   Last time this was given:  7/30/2018  1:50 PM                                methocarbamol 500 MG tablet   Commonly known as:  ROBAXIN   Take 1 tablet (500 mg) by mouth 2 times daily as needed for muscle spasms   Last time this was given:  500 mg on 7/30/2018  5:08 AM                                multivitamin, therapeutic Tabs tablet   Take 1 tablet by mouth daily   Last time this was given:  1 tablet on 7/30/2018  7:37 AM                                oxyCODONE IR 5 MG tablet   Commonly known as:  ROXICODONE   Take 0.5-1 tablets (2.5-5 mg) by mouth every 3 hours as needed for moderate to severe pain   Last time this was given:  5 mg on 7/30/2018 10:30 AM                                simethicone 40 MG/0.6ML suspension   Commonly known as:  MYLICON   Take 0.6 mLs (40 mg) by mouth every 6 hours as needed for cramping                                vancomycin 50 MG/ML Solr   Commonly known as:  FIRVANQ   Take 2.5 mLs (125 mg) by mouth 4 times daily for 10 days   Last time this was given:  125 mg on 7/30/2018  1:49 PM                                vitamin B complex with vitamin C Tabs tablet   Take 1 tablet by mouth daily                                zinc  oxide 40 % ointment   Commonly known as:  DESITIN   Apply topically 2 times daily   Last time this was given:  7/30/2018  7:38 AM

## 2018-07-25 NOTE — IP AVS SNAPSHOT
"          [address] [facility]: [phone]                                              INTERAGENCY TRANSFER FORM - LAB / IMAGING / EKG / EMG RESULTS   2018                    Hospital Admission Date: 2018  GERHARD SU   : 1940  Sex: Female        Attending Provider: Aakash Collins MD     Allergies:  Darvon [Propoxyphene], Penicillins, Ketoconazole    Infection:  None   Service:  INTERNAL MED    Ht:  1.676 m (5' 6\")   Wt:  51.9 kg (114 lb 8 oz)   Admission Wt:  47.9 kg (105 lb 9.6 oz)    BMI:  18.48 kg/m 2   BSA:  1.55 m 2            Patient PCP Information     Provider PCP Type    Baron Seth MD General         Lab Results - 3 Days      Fungus Culture, non-blood [[id number]]  Resulted: 18 1428, Result status: Preliminary result    Ordering provider: Zaire Phan MD  18 1853 Resulting lab: [facility]    Specimen Information    Type Source Collected On   Wound Hip, Right 18 1852          Components       Value Reference Range Flag Lab   Specimen Description Right Hip Wound SPECIMEN 1      Culture Micro Culture negative after 4 days   225            Hemoglobin [[id number]] (Abnormal)  Resulted: 18 0847, Result status: Final result    Ordering provider: Rex Ballard MD  18 0650 Resulting lab: [facility]    Specimen Information    Type Source Collected On     18 0650          Components       Value Reference Range Flag Lab   Hemoglobin 9.8 11.7 - 15.7 g/dL L 51            Potassium [[id number]]  Resulted: 18 0730, Result status: Final result    Ordering provider: Aakash Collins MD  18 0000 Resulting lab: [facility]    Specimen Information    Type Source Collected On   Blood  18 0650          Components       Value Reference Range Flag Lab   Potassium 4.4 3.4 - 5.3 mmol/L  51          "   Platelet count [[id number]]  Resulted: 07/29/18 0705, Result status: Final result    Ordering provider: Rex Ballard MD  07/29/18 0000 Resulting lab: [facility]    Specimen Information    Type Source Collected On   Blood  07/29/18 0650          Components       Value Reference Range Flag Lab   Platelet Count 257 150 - 450 10e9/L  51            Wound Culture Aerobic Bacterial [[id number]]  Resulted: 07/28/18 1112, Result status: Final result    Ordering provider: Zaire Phan MD  07/26/18 1853 Resulting lab: [facility]    Specimen Information    Type Source Collected On   Wound Hip, Right 07/26/18 1852          Components       Value Reference Range Flag Lab   Specimen Description Right Hip Wound SPECIMEN 1      Culture Micro No growth   225            Magnesium [[id number]]  Resulted: 07/28/18 0801, Result status: Final result    Ordering provider: Aakash Collins MD  07/28/18 0100 Resulting lab: [facility]    Specimen Information    Type Source Collected On   Blood  07/28/18 0716          Components       Value Reference Range Flag Lab   Magnesium 2.0 1.6 - 2.3 mg/dL  51            Potassium [[id number]]  Resulted: 07/28/18 0801, Result status: Final result    Ordering provider: Aaksah Collins MD  07/28/18 0101 Resulting lab: [facility]    Specimen Information    Type Source Collected On   Blood  07/28/18 0716          Components       Value Reference Range Flag Lab   Potassium 3.8 3.4 - 5.3 mmol/L  51            CBC with platelets [[id number]] (Abnormal)  Resulted: 07/28/18 0745, Result status: Final result    Ordering provider: Guzman Uriostegui PA-C  07/28/18 0100 Resulting lab: [facility]    Specimen Information    Type Source Collected On   Blood  07/28/18 0716          Components       Value Reference  Range Flag Lab   WBC 8.5 4.0 - 11.0 10e9/L  51   RBC Count 3.86 3.8 - 5.2 10e12/L  51   Hemoglobin 10.8 11.7 - 15.7 g/dL L 51   Hematocrit 34.3 35.0 - 47.0 % L 51   MCV 89 78 - 100 fl  51   MCH 28.0 26.5 - 33.0 pg  51   MCHC 31.5 31.5 - 36.5 g/dL  51   RDW 14.3 10.0 - 15.0 %  51   Platelet Count 283 150 - 450 10e9/L  51            Anaerobic bacterial culture [[id number]]  Resulted: 07/27/18 1202, Result status: Preliminary result    Ordering provider: Zaire Phan MD  07/26/18 1853 Resulting lab: [facility]    Specimen Information    Type Source Collected On   Wound Hip, Right 07/26/18 1852          Components       Value Reference Range Flag Lab   Specimen Description Right Hip Wound SPECIMEN 1      Special Requests Received in anaerobic tubes.   75   Culture Micro Culture negative monitoring continues   225            Basic metabolic panel [[id number]]  Resulted: 07/27/18 0845, Result status: Final result    Ordering provider: Guzman Uriostegui PA-C  07/27/18 0804 Resulting lab: [facility]    Specimen Information    Type Source Collected On   Blood  07/27/18 0759          Components       Value Reference Range Flag Lab   Sodium 137 133 - 144 mmol/L  51   Potassium 3.9 3.4 - 5.3 mmol/L  51   Chloride 102 94 - 109 mmol/L  51   Carbon Dioxide 28 20 - 32 mmol/L  51   Anion Gap 6 3 - 14 mmol/L  51   Glucose 91 70 - 99 mg/dL  51   Urea Nitrogen 10 7 - 30 mg/dL  51   Creatinine 0.53 0.52 - 1.04 mg/dL  51   GFR Estimate >90 >60 mL/min/1.7m2  51   Comment:  Non  GFR Calc   GFR Estimate If Black >90 >60 mL/min/1.7m2  51   Comment:  African American GFR Calc   Calcium 8.6 8.5 - 10.1 mg/dL  51            Magnesium [[id number]]  Resulted: 07/27/18 0845, Result status: Final result    Ordering provider: Guzman Uriostegui PA-C  07/27/18 0804 Resulting lab: [facility]    Specimen Information    Type Source  Collected On   Blood  07/27/18 0759          Components       Value Reference Range Flag Lab   Magnesium 1.8 1.6 - 2.3 mg/dL  51            Phosphorus [[id number]]  Resulted: 07/27/18 0845, Result status: Final result    Ordering provider: Guzman Uriostegui PA-C  07/27/18 0804 Resulting lab: [facility]    Specimen Information    Type Source Collected On   Blood  07/27/18 0759          Components       Value Reference Range Flag Lab   Phosphorus 2.9 2.5 - 4.5 mg/dL  51            CBC with platelets [[id number]] (Abnormal)  Resulted: 07/27/18 0828, Result status: Final result    Ordering provider: Guzman Uriostegui PA-C  07/27/18 0804 Resulting lab: [facility]    Specimen Information    Type Source Collected On   Blood  07/27/18 0759          Components       Value Reference Range Flag Lab   WBC 7.4 4.0 - 11.0 10e9/L  51   RBC Count 3.98 3.8 - 5.2 10e12/L  51   Hemoglobin 11.3 11.7 - 15.7 g/dL L 51   Hematocrit 35.0 35.0 - 47.0 %  51   MCV 88 78 - 100 fl  51   MCH 28.4 26.5 - 33.0 pg  51   MCHC 32.3 31.5 - 36.5 g/dL  51   RDW 13.8 10.0 - 15.0 %  51   Platelet Count 329 150 - 450 10e9/L  51            Clostridium difficile toxin B PCR [[id number]] (Abnormal)  Resulted: 07/27/18 0508, Result status: Edited Result - FINAL    Ordering provider: Aakash Collins MD  07/27/18 0353 Resulting lab: [facility]    Specimen Information    Type Source Collected On   Feces  07/27/18 0353          Components       Value Reference Range Flag Lab   Specimen Description Feces   75   C Diff Toxin B PCR Positive NEG^Negative A 75   Comment:         Positive: Toxin producing Clostridium difficile target DNA sequences detected,   presumed positive for Clostridium difficile toxin B.  Clostridium difficile (Requires Enteric Isolation)  FDA approved assay performed using [employer] GeneXpert real-time PCR.  Critical Value/Significant Value called to  "and read back by   SHELLEY VALDEZ RN (7B).  07.27.18 0508 GJS              C difficile culture [[id number]] (Abnormal)  Resulted: 07/27/18 0412, Result status: Final result    Ordering provider: Aakash Colilns MD  07/27/18 0352 Resulting lab: [facility]    Specimen Information    Type Source Collected On   Feces  07/27/18 0353          Components       Value Reference Range Flag Lab   Specimen Descrip Feces   51   C Difficile Culture Canceled, Test credited NEG^Negative A 75   Comment:  Incorrectly ordered by PCU/Clinic  Test reordered as correct code  CDBPCR              Testing Performed By     Lab - Abbreviation Name Director Address Valid Date Range    51 - Unknown [facility] Unknown [address] [facility] [id number] 12/31/14 1010 - Present    75 - Unknown [facility] Unknown [address] [facility] [id number] 01/15/15 1019 - Present    225 - Unknown [facility] Unknown [address] [facility] [id number] 12/19/14 0954 - Present            Unresulted Labs (24h ago through future)    Start       Ordered    07/29/18 0600  Platelet count  (enoxaparin (LOVENOX) (Weight  kg with CrCl greater than 30 mL/min is prechecked))  EVERY THREE DAYS,   Routine     Comments:  Repeat every 3 days while on VTE prophylaxis. If no result is listed, this lab has not been done the past 365 days. LATEST LAB RESULT: Platelet Count (10e9/L)       Date                     Value                 07/26/2018               348              ----------      07/26/18 2159    Unscheduled  Potassium  (Potassium Replacement - \"High\" - Replacement for all levels less than 4.1 mmol/L - UU,UR,UA,RH,SH,PH,WY )  CONDITIONAL (SPECIFY),   Routine     Comments:  Obtain Potassium Level for these conditions:  *IF no potassium result within 24 hrs before initiation of order set, draw potassium level with next lab " "collect.    *2 HOURS AFTER last IV potassium replacement dose and 4 hours after an oral replacement dose when potassium replacement given for level less than 3.4.  *Next morning after potassium dose.     Repeat Potassium Replacement if necessary.    07/25/18 2309    Unscheduled  Magnesium  (Magnesium Replacement - Adult - \"High\" - Replacement for all levels less than or equal to 2 mg/dL)  CONDITIONAL (SPECIFY),   Routine     Comments:  Obtain Magnesium Level for these conditions:  *IF no magnesium result within 24 hrs before initiation of order set, draw magnesium level with next lab collect.    *2 HOURS AFTER last magnesium replacement dose when magnesium replacement given for level less than 1.6  *Next morning after magnesium dose.     Repeat Magnesium Replacement if necessary.    07/25/18 2309    Unscheduled  Hemoglobin  CONDITIONAL X 2,   Routine     Comments:  IF morning Hemoglobin result is LESS than 8.0 on POD 1 and/or POD 2, release order and recheck Hemoglobin in the evening at 1700.  Notify Provider if second Hemoglobin result is LESS than 7.5.    07/26/18 2159         Imaging Results - 3 Days      XR Foot Right G/E 3 Views [[id number]]  Resulted: 07/29/18 1641, Result status: Final result    Ordering provider: Boris Cheng MD  07/29/18 0903 Resulted by: Ellermann, Jutta M, MD    Performed: 07/29/18 1527 - 07/29/18 1608 Resulting lab: RADIOLOGY RESULTS    Narrative:       EXAMINATION: XR FOOT RT G/E 3 VW  7/29/2018 4:08 PM     CLINICAL HISTORY:  proximal midfoot pain;      COMPARISON: None available    FINDINGS: AP, oblique and lateral views of the right foot were  obtained. Radiographs were compared to the prior imaging of the ankle.  The tarsal bones appear normally aligned. The bones are overall  markedly osteopenic. There are degenerative changes of the first and  second metatarsophalangeal joints. No definite evidence of acute  osseous abnormalities, however, evaluation is limited by osteopenia.      " Impression:       IMPRESSION: No definite evidence of acute osseous abnormalities in the  limited examination due to patient's marked osteopenia. If patient's  symptoms persist a follow-up ankle radiograph (this was a markedly  limited study due to nonstandard projections) or CT of the ankle and  foot is recommended.    JUTTA ELLERMANN, MD      XR Ankle Port Right 2 Views [[id number]]  Resulted: 07/27/18 1746, Result status: Final result    Ordering provider: Guzman Uriostegui PA-C  07/27/18 0851 Resulted by: Ellermann, Jutta M, MD Hafertepe, Michael, MD    Performed: 07/27/18 1209 - 07/27/18 1257 Resulting lab: RADIOLOGY RESULTS    Narrative:       3 views right ankle radiographs 7/27/2018 1:03 PM    History: right ankle swelling. S/p  fall;     Additional History from EMR: Patient fell;  right femoral neck  fracture. Concern for ankle fracture    Comparison: None    Findings: 2 radiographs, nonstandard projections were obtained.    Patient is severely osteopenic. No obviously displaced fracture  however study is limited by the nonstandard projections and osteopenia  which could severely limits the diagnostic accuracy.     Ankle mortise and syndesmosis are congruent on this non-weight bearing  images.    Soft tissue is unremarkable.      Impression:       Impression:  1. Severe osteopenia  2. Study is limited by osteopenia and nonstandard projections which  severely limits the diagnostic accuracy, there is a high clinical  suspicion for fracture consider CT or repeat radiographs.     I have personally reviewed the examination and initial interpretation  and I agree with the findings.    JUTTA ELLERMANN, MD      Testing Performed By     Lab - Abbreviation Name Director Address Valid Date Range    104 - [facility] RADIOLOGY RESULTS Unknown Unknown 02/16/05 1553 - Present            Encounter-Level Documents:     There are no encounter-level documents.      Order-Level Documents:     There are no order-level documents.
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
## 2018-07-25 NOTE — LETTER
Transition Communication Hand-off for Care Transitions to Next Level of Care Provider    Name: Elizbaeth Taylor  : 1940  MRN #: [id number]  Primary Care Provider: Baron Seth     Primary Clinic: [address]  [facility] [address]     Reason for Hospitalization:  Closed displaced fracture of neck of right femur (H) [S72.001A]  Admit Date/Time: 2018  4:26 PM  Discharge Date: 18  Payor Source: Payor: MEDICARE / Plan: MEDICARE / Product Type: Medicare /     Readmission Assessment Measure (ANN) Risk Score/category: Elevated  Reason for Communication Hand-off Referral: Other Discharge to TCU.    Discharge Plan:  [facility], [facility]  PH: [phone]  F: [phone]     Concern for non-adherence with plan of care:   Y/N N  Discharge Needs Assessment:  Needs       Most Recent Value    Equipment Currently Used at Home walker, rolling    Transportation Available family or friend will provide, car          Already enrolled in Tele-monitoring program and name of program:  MTM referral  Follow-up specialty is recommended: Yes    Follow-up plan:  Future Appointments  Date Time Provider Department Center   2018 10:00 AM Zaire Phan MD [facility] [email or web address]   2018 11:00 AM  [facility] [email or web address]]   2018 11:30 AM Shen Ray MD [facility]   2018 1:00 PM [facility]       Any outstanding tests or procedures:        Referrals     Future Labs/Procedures    Occupational Therapy Adult Consult     Comments:    Evaluate and treat as clinically indicated.    Reason:   Right femoral neck fracture    Physical Therapy Adult Consult     Comments:    Evaluate and treat as clinically indicated.    Reason:  Right femoral neck fracture        Supplies     Future Labs/Procedures    Pneumatic Compression Device      Comments:    Bilateral calf. Remove 30 mins BID.          Key Recommendations:      None for today.  Thank you,    Tamika  GOMEZ Martinez, APSW  [address]   Phone: [phone]  Pager: [phone]  Unit: [phone]

## 2018-07-25 NOTE — IP AVS SNAPSHOT
"    UNIT 7[address] [facility]: [phone]                                              INTERAGENCY TRANSFER FORM - PHYSICIAN ORDERS   2018                    Hospital Admission Date: 2018  GERHARD SU   : 1940  Sex: Female        Attending Provider: Aakash Collins MD     Allergies:  Darvon [Propoxyphene], Penicillins, Ketoconazole    Infection:  None   Service:  INTERNAL MED    Ht:  1.676 m (5' 6\")   Wt:  51.9 kg (114 lb 8 oz)   Admission Wt:  47.9 kg (105 lb 9.6 oz)    BMI:  18.48 kg/m 2   BSA:  1.55 m 2            Patient PCP Information     Provider PCP Type    Baron Seth MD General      ED Clinical Impression     Diagnosis Description Comment Added By Time Added    Closed displaced fracture of neck of right femur (H) [S72.001A] Closed displaced fracture of neck of right femur (H) [S72.001A]  Aakash Gutierrez MD 2018  7:36 PM      Hospital Problems as of 2018              Priority Class Noted POA    Femoral neck fracture (H) Medium  2018 Yes      Non-Hospital Problems as of 2018              Priority Class Noted    CARDIOVASCULAR SCREENING; LDL GOAL LESS THAN 160 Medium  10/31/2010    Dry eye syndrome Medium  2012    Malignant neoplasm of anal canal (H) Medium  2018    Diarrhea Medium  3/19/2018      Code Status History     Date Active Date Inactive Code Status Order ID Comments User Context    2018  4:06 PM  Full Code [id number]  Guzman Uriostegui PA-C Outpatient    2018 11:09 PM 2018  4:06 PM Full Code [id number]  Quin Jackson MD Inpatient    3/19/2018  3:51 PM 2018  6:41 PM Full Code [id number]  Jazmine Johns MD Inpatient         Medication Review      START taking        Dose / Directions Comments    diclofenac 1.3 % Patch   Commonly known as:  FLECTOR        Dose:  1 patch   Place 1 patch onto the skin 2 times daily   Refills:  0        dimethicone 1.3 % Lotn lotion   Used for:  Wound of buttock, unspecified " laterality, subsequent encounter        Externally apply topically 2 times daily   Refills:  0        enoxaparin 30 MG/0.3ML injection   Commonly known as:  LOVENOX   Used for:  Closed displaced fracture of neck of right femur (H)        Dose:  30 mg   Inject 0.3 mLs (30 mg) Subcutaneous every 24 hours for 28 days   Refills:  0        oxyCODONE IR 5 MG tablet   Commonly known as:  ROXICODONE   Used for:  Closed displaced fracture of neck of right femur (H)        Dose:  2.5-5 mg   Take 0.5-1 tablets (2.5-5 mg) by mouth every 3 hours as needed for moderate to severe pain   Quantity:  20 tablet   Refills:  0        vancomycin 50 MG/ML Solr   Commonly known as:  FIRVANQ   Indication:  Clostridium difficile Bacteria, Community Acquired Pneumonia   Used for:  C. difficile colitis        Dose:  125 mg   Take 2.5 mLs (125 mg) by mouth 4 times daily for 10 days   Quantity:  105 mL   Refills:  0        zinc oxide 40 % ointment   Commonly known as:  DESITIN   Used for:  Wound of buttock, unspecified laterality, subsequent encounter   Replaces:  BUTT PASTE (WITH H.C)        Apply topically 2 times daily   Refills:  0          CONTINUE these medications which may have CHANGED, or have new prescriptions. If we are uncertain of the size of tablets/capsules you have at home, strength may be listed as something that might have changed.        Dose / Directions Comments    acetaminophen 500 MG tablet   Commonly known as:  TYLENOL   This may have changed:    - medication strength  - how much to take  - when to take this  - reasons to take this   Used for:  Closed displaced fracture of neck of right femur (H)        Dose:  1000 mg   Take 2 tablets (1,000 mg) by mouth every 8 hours for 5 days   Refills:  0          CONTINUE these medications which have NOT CHANGED        Dose / Directions Comments    artificial saliva Liqd liquid   Used for:  Mouth dryness        Dose:  5-10 mL   Swish and spit 5-10 mLs in mouth 4 times daily as needed  for dry mouth   Refills:  0        calcium-vitamin D 600-400 MG-UNIT per tablet   Commonly known as:  CALTRATE   Used for:  Age-related osteoporosis with current pathological fracture, initial encounter        Dose:  1 tablet   Take 1 tablet by mouth daily   Quantity:  60 tablet   Refills:  0        menthol-zinc oxide 0.44-20.625 % Oint ointment   Commonly known as:  CALMOSEPTINE   Used for:  Malignant neoplasm of anal canal (H)        Apply thick layer to irritated perianal skin 4 times per day and after bowel movements.   Quantity:  1 Tube   Refills:  3        methocarbamol 500 MG tablet   Commonly known as:  ROBAXIN   Used for:  Closed fracture of one rib of right side, initial encounter        Dose:  500 mg   Take 1 tablet (500 mg) by mouth 2 times daily as needed for muscle spasms   Quantity:  120 tablet   Refills:  0        multivitamin, therapeutic Tabs tablet   Used for:  Age-related osteoporosis with current pathological fracture, initial encounter        Dose:  1 tablet   Take 1 tablet by mouth daily   Refills:  0        simethicone 40 MG/0.6ML suspension   Commonly known as:  MYLICON   Used for:  Malignant neoplasm of anal canal (H), Flatulence, eructation and gas pain        Dose:  40 mg   Take 0.6 mLs (40 mg) by mouth every 6 hours as needed for cramping   Refills:  0        vitamin B complex with vitamin C Tabs tablet        Dose:  1 tablet   Take 1 tablet by mouth daily   Refills:  0          STOP taking     BUTT PASTE (WITH H.C)   Replaced by:  zinc oxide 40 % ointment           diphenoxylate-atropine 2.5-0.025 MG per tablet   Commonly known as:  LOMOTIL           loperamide 2 MG tablet   Commonly known as:  LOPERAMIDE A-D           ondansetron 8 MG ODT tab   Commonly known as:  ZOFRAN-ODT           traMADol 50 MG tablet   Commonly known as:  ULTRAM                   Summary of Visit     Reason for your hospital stay       You were hospitalized and treated for the following conditions:  1. Right  femoral neck fracture    You were seen by the following services:  Trauma Service  Orthopedics  Physical and Occupational therapies             After Care     Activity - Up with nursing assistance           Additional Discharge Instructions           Advance Diet as Tolerated       Follow this diet upon discharge: Regular diet   Encourage PO intake       Daily weights       Call Provider for weight gain of more than 2 pounds per day or 5 pounds per week.       Encourage PO fluids           Fall precautions           General info for SNF       Length of Stay Estimate: Short Term Care: Estimated # of Days <30  Condition at Discharge: Stable  Level of care:skilled   Rehabilitation Potential: Good  Admission H&P remains valid and up-to-date: Yes  Recent Chemotherapy: N/A  Use Nursing Home Standing Orders: N/A       Hip precautions       Avoid flexion greater than 90 degrees (avoid bending past 90 degrees)   Avoid internal rotation (twisting your legs in or out)  Avoid adduction past midline (avoid crossing your legs)  Avoid pivoting.  Keep a pillow (or abductor pillow) between your thighs to keep your knees from touching.       Intake and output       Every shift       Mantoux instructions       Give two-step Mantoux (PPD) Per Facility Policy Yes       Weight bearing status       Weightbearing as  Tolerated       Wound care       Site:   Perianal/rectal area   Instructions:   BID wound cares.             Referrals     Occupational Therapy Adult Consult       Evaluate and treat as clinically indicated.    Reason:   Right femoral neck fracture       Physical Therapy Adult Consult       Evaluate and treat as clinically indicated.    Reason:  Right femoral neck fracture             Supplies     Pneumatic Compression Device        Bilateral calf. Remove 30 mins BID.             Your next 10 appointments already scheduled     Aug 21, 2018 10:00 AM CDT   (Arrive by 9:45 AM)   Post-Op with Zaire Phan MD   [facility]  Orthopaedic Clinic ([facility] [place])    [address]  4th Floor  [facility] [address]   [phone]            Aug 31, 2018 11:00 AM CDT   [facility] with  [facility]   [facility] ([facility])    [address]  [address]  [facility] [address]   [phone]            Aug 31, 2018 11:30 AM CDT   (Arrive by 11:15 AM)   Return Visit with Shen Ray MD   [facility] ([facility])    [address]  [address]  [facility] [address]   [phone]            Aug 31, 2018  1:00 PM CDT   Return Visit with Zaire Madison MD   Radiation Oncology Clinic ([facility])    [facility]  1st Floor  [address] [address]   [phone]              Follow-Up Appointment Instructions     Future Labs/Procedures    Follow Up and recommended labs and tests     Comments:    Follow up with your primary care provider for continued medical care and hospital follow up in 5-10 days.     Medication Therapy Management Services  If you have any questions regarding your medications after discharge, this service is available to you.  Please call:  [phone] or [phone] (toll-free)  [facility]  [address] [place] [address] [place] [address]  [email or web address]  [email or web address]    Trauma Clinic  [facility]  Floor 4  [address] [place] [address]   Appointments: [phone]    Orthopaedic Clinic in 2 weeks with Dr Gamez  [facility]  Floor 4   [address] [place] [address]   Appointments: [phone]      Follow-Up Appointment Instructions     Follow Up and recommended labs and tests       Follow up with your primary care provider for continued medical care and hospital follow up in 5-10 days.     Medication Therapy Management  Services  If you have any questions regarding your medications after discharge, this service is available to you.  Please call:  [phone] or [phone] (toll-free)  [facility]  [address] [place] [address]  [email or web address]    Trauma Clinic  [facility]  Floor 4  [address] [place] [address]   Appointments: [phone]    Orthopaedic Clinic in 2 weeks with Dr Gamez  [facility]  Floor 4   [address] [place], [place] [address]   Appointments: [phone]             Statement of Approval     Ordered          07/30/18 0865  I have reviewed and agree with all the recommendations and orders detailed in this document.  EFFECTIVE NOW     Approved and electronically signed by:  Hugo Farrell NP

## 2018-07-25 NOTE — ED TRIAGE NOTES
PT arrives from facility for complaint of pain in her R hip after a fall several weeks ago, Pt was ambulating before the fall but has been non-weightbearing since the fall and today after the pain hadnt improved pt decided to come in.

## 2018-07-25 NOTE — IP AVS SNAPSHOT
` `     UNIT [address] [facility] BANK: [phone]            Medication Administration Report for Elizabeth Taylor as of 07/30/18 1509   Legend:    Given Hold Not Given Due Canceled Entry Other Actions    Time Time (Time) Time  Time-Action       Inactive    Active    Linked        Medications 07/24/18 07/25/18 07/26/18 07/27/18 07/28/18 07/29/18 07/30/18    acetaminophen (TYLENOL) tablet 1,000 mg  Dose: 1,000 mg  Freq: EVERY 8 HOURS Route: PO  Start: 07/26/18 0000   Admin Instructions: Alternate ibuprofen (if ordered) with acetaminophen.  Maximum acetaminophen dose from all sources = 75 mg/kg/day not to exceed 4 gram    Admin. Amount: 2 tablet (2 × 500 mg tablet)  Last Admin: 07/30/18 1350  Dispense Loc: [facility] ADS 7B       0031 (1,000 mg)-Given       0838 (1,000 mg)-Given       0849-Auto Hold       1036-Unhold       [ ] 1600       1609-Auto Hold       2143-Unhold       2345 (1,000 mg)-Given        0827 (1,000 mg)-Given       1709 (1,000 mg)-Given        0009 (1,000 mg)-Given       0848 (1,000 mg)-Given       1752 (1,000 mg)-Given        (0338)-Not Given       0755 (1,000 mg)-Given       1652 (1,000 mg)-Given        (0026)-Not Given       0514 (1,000 mg)-Given       1350 (1,000 mg)-Given       [ ] 2200           artificial saliva (BIOTENE DRY MOUTHWASH) liquid 5-10 mL  Dose: 5-10 mL  Freq: 4 TIMES DAILY PRN Route: SWISH & SPIT  PRN Reason: dry mouth  Start: 07/25/18 2309   Admin. Amount: 5-10 mL  Dispense Loc: [facility]  Volume: 237 mL       0849-Auto Hold       1036-Unhold       1609-Auto Hold       2143-Unhold               benzocaine-menthol (CEPACOL) 15-3.6 MG lozenge 1 lozenge  Dose: 1 lozenge  Freq: EVERY 1 HOUR PRN Route: BU  PRN Reason: sore throat  Start: 07/26/18 2159   Admin Instructions: For sore throat without fever.    Admin. Amount: 1 lozenge  Dispense Loc: [facility] ADS 7B  POC: Post-procedure               bisacodyl (DULCOLAX) Suppository 10 mg  Dose: 10 mg  Freq: DAILY PRN Route: RE  PRN  Reason: constipation  PRN Comment: IF Miralax ineffective  Start: 07/25/18 2309   Admin Instructions: Hold for loose stools.    Admin. Amount: 1 suppository (1 × 10 mg suppository)  Dispense Loc: [facility] ADS 7B       0849-Auto Hold       1036-Unhold       1609-Auto Hold       2143-Unhold               calcium-vitamin D (CALTRATE) 600-400 MG-UNIT per tablet 1 tablet  Dose: 1 tablet  Freq: DAILY Route: PO  Start: 07/26/18 0800   Admin. Amount: 1 tablet  Last Admin: 07/30/18 0737  Dispense Loc: [facility] ADS 7B       0838 (1 tablet)-Given       0849-Auto Hold       1036-Unhold       1609-Auto Hold       2143-Unhold        0828 (1 tablet)-Given        0848 (1 tablet)-Given        0754 (1 tablet)-Given        0737 (1 tablet)-Given           diclofenac (FLECTOR) 1.3 % Patch 1 patch  Dose: 1 patch  Freq: 2 TIMES DAILY Route: TD  Start: 07/27/18 0930   Admin. Amount: 1 patch  Last Admin: 07/29/18 2101  Dispense Loc: [facility]        (1206)-Not Given       2101 (1 patch)-Given        0848 (1 patch)-Given       2119 (1 patch)-Given        0803 (1 patch)-Given       2101 (1 patch)-Given        0742 (1 patch)-Patch Removed       [ ] 2000          And  diclofenac (FLECTOR) Patch in Place  Freq: EVERY 8 HOURS Route: TD  Start: 07/27/18 0915   Admin Instructions: Chart every shift, confirming that patch is still in place on patient (no barcode scan needed). See patch order for dose information.    Last Admin: 07/30/18 0026  Dispense Loc: [facility]        (1206)-Not Given       (1732)-Not Given        0054 ( )-Patch in Place       0852 ( )-Patch in Place       1752 ( )-Patch in Place        0017 ( )-Patch in Place       0917 ( )-Patch in Place       1655 ( )-Patch in Place        0026 ( )-Patch in Place       1022 ( )-Patch Removed       [ ] 1715          And  diclofenac (FLECTOR) patch REMOVAL  Freq: 2 TIMES DAILY Route: TD  Start: 07/27/18 2000   Admin Instructions: Remove patch when new patch is applied or  patch is discontinued.    Last Admin: 07/30/18 0740  Dispense Loc: [facility]        (2106)-Not Given        0847 ( )-Patch Removed       2126 ( )-Patch Removed        0803 ( )-Patch Removed       2102 ( )-Patch Removed        0740 ( )-Given       [ ] 2000           dimethicone 1.3 % lotion LOTN  Freq: 2 TIMES DAILY Route: EX  Start: 07/29/18 1030   Admin Instructions: Apply topically to perineum    Last Admin: 07/30/18 0740  Dispense Loc: [facility]          1222 ( )-Given       (2000)-Not Given        0740 ( )-Given       [ ] 2000           enoxaparin (LOVENOX) injection 30 mg  Dose: 30 mg  Freq: EVERY 24 HOURS Route: SC  Start: 07/27/18 1000   Admin Instructions: Check to make sure start date/time is 12-24 hours post op unless documented complication, AND no sooner than 22 hours post op if spinal anesthesia used.   Continue until discharge to home. HOLD if platelet count falls below 50% of baseline or less than 100,000/ L and notify provider.    Admin. Amount: 30 mg = 0.3 mL Conc: 30 mg/0.3 mL  Last Admin: 07/30/18 1030  Dispense Loc: [facility] ADS 7B  Volume: 0.3 mL  POC: Post-procedure        1035 (30 mg)-Given        0921 (30 mg)-Given        1006 (30 mg)-Given        1030 (30 mg)-Given           HYDROmorphone (DILAUDID) injection 0.2 mg  Dose: 0.2 mg  Freq: EVERY 2 HOURS PRN Route: IV  PRN Comment: pain control or improvement in physical function. Hold dose for analgesic side effects.  Start: 07/25/18 2309   Admin Instructions: Notify provider to assess for uncontrolled pain or analgesic side effects. Hold while on PCA or with regular IV opioid dosing    Admin. Amount: 0.2 mg = 0.2 mL Conc: 1 mg/mL  Last Admin: 07/26/18 0629  Dispense Loc: [facility] ADS 7B  Volume: 0.2 mL       0419 (0.2 mg)-Given       0629 (0.2 mg)-Given       0849-Auto Hold       1036-Unhold       1609-Auto Hold       2143-Unhold               lidocaine (LMX4) cream  Freq: EVERY 1 HOUR PRN Route: Top  PRN Reason:  "pain  PRN Comment: with VAD insertion or accessing implanted port.  Start: 07/26/18 2159   Admin Instructions: Do NOT give if patient has a history of allergy to any local anesthetic or any \"mary\" product.   Apply 30 minutes prior to VAD insertion or port access.  MAX Dose:  2.5 g (  of 5 g tube)    Dispense Loc: [facility] ADS 7B  POC: Post-procedure               lidocaine 1 % 1 mL  Dose: 1 mL  Freq: EVERY 1 HOUR PRN Route: OTHER  PRN Comment: mild pain with VAD insertion or accessing implanted port  Start: 07/26/18 2159   Admin Instructions: Do NOT give if patient has a history of allergy to any local anesthetic or any \"mary\" product. MAX dose 1 mL subcutaneous OR intradermal in divided doses.    Admin. Amount: 1 mL  Dispense Loc: [facility] ADS 7B  Volume: 2 mL  POC: Post-procedure               magnesium sulfate 2 g in water intermittent infusion  Dose: 2 g  Freq: DAILY PRN Route: IV  PRN Reason: magnesium supplementation  Last Dose: 2 g (07/27/18 1034)  Start: 07/25/18 2309   Admin Instructions: For Serum Mg++ 1.6 - 2 mg/dL  Give 2 g and recheck magnesium level next AM.    Admin. Amount: 2 g = 50 mL Conc: 0.04 g/mL  Last Admin: 07/27/18 1034  Dispense Loc: [facility] ADS 7B  Infused Over: 60 Minutes  Volume: 50 mL       0849-Auto Hold       1036-Unhold       1609-Auto Hold       2143-Unhold        1034 (2 g)-New Bag              magnesium sulfate 4 g in 100 mL sterile water (premade)  Dose: 4 g  Freq: EVERY 4 HOURS PRN Route: IV  PRN Reason: magnesium supplementation  Start: 07/25/18 2309   Admin Instructions: For serum Mg++ less than 1.6 mg/dL  Give 4 g and recheck magnesium level 2 hours after dose, and next AM.    Admin. Amount: 4 g = 100 mL Conc: 4 g/100 mL  Dispense Loc: [facility] ADS 7B  Infused Over: 120 Minutes  Volume: 100 mL       0849-Auto Hold       1036-Unhold       1609-Auto Hold       2143-Unhold               menthol-zinc oxide (CALMOSEPTINE) 0.44-20.625 % ointment  Freq: 3 TIMES DAILY Route: " Top  Start: 07/26/18 0800   Admin Instructions: Apply to perineum    Last Admin: 07/30/18 1350  Dispense Loc: [facility]       0849-Auto Hold       1036-Unhold       (1049)-Not Given       1432 ( )-Given       1609-Auto Hold       2000 Auto Hold-Automatically Held       2143-Unhold        (1034)-Not Given       (1427)-Not Given       (2106)-Not Given       2117 ( )-Given        0857 ( )-Given       (1533)-Not Given       2030 ( )-Given        0757 ( )-Given       1400 ( )-Given       2101 ( )-Given        0737 ( )-Given       1350 ( )-Given       [ ] 2000           methocarbamol (ROBAXIN) tablet 500 mg  Dose: 500 mg  Freq: 2 TIMES DAILY PRN Route: PO  PRN Reason: muscle spasms  Start: 07/25/18 2309   Admin. Amount: 1 tablet (1 × 500 mg tablet)  Last Admin: 07/30/18 0508  Dispense Loc: [facility] ADS 7B       0849-Auto Hold       1036-Unhold       1609-Auto Hold       2143-Unhold          0505 (500 mg)-Given        0508 (500 mg)-Given           multivitamin, therapeutic (THERA-VIT) tablet 1 tablet  Dose: 1 tablet  Freq: DAILY Route: PO  Start: 07/26/18 0800   Admin. Amount: 1 tablet  Last Admin: 07/30/18 0737  Dispense Loc: [facility] ADS 7B       0838 (1 tablet)-Given       0849-Auto Hold       1036-Unhold       1609-Auto Hold       2143-Unhold        0827 (1 tablet)-Given        0848 (1 tablet)-Given        0754 (1 tablet)-Given        0737 (1 tablet)-Given           naloxone (NARCAN) injection 0.1-0.4 mg  Dose: 0.1-0.4 mg  Freq: EVERY 2 MIN PRN Route: IV  PRN Reason: opioid reversal  Start: 07/25/18 2309   Admin Instructions: For respiratory rate LESS than or EQUAL to 8.  Partial reversal dose:  0.1 mg titrated q 2 minutes for Analgesia Side Effects Monitoring Sedation Level of 3 (frequently drowsy, arousable, drifts to sleep during conversation).Full reversal dose:  0.4 mg bolus for Analgesia Side Effects Monitoring Sedation Level of 4 (somnolent, minimal or no response to stimulation).  For ordered doses up  to 2mg give IVP. Give each 0.4mg over 15 seconds in emergency situations. For non-emergent situations further dilute in 9mL of NS to facilitate titration of response.    Admin. Amount: 0.1-0.4 mg = 0.25-1 mL Conc: 0.4 mg/mL  Dispense Loc: [facility] ADS 7B  Volume: 1 mL       0849-Auto Hold       1036-Unhold       1609-Auto Hold       2143-Unhold               NEPHRO-MARY RX tablet 1 mg  Dose: 1 tablet  Freq: DAILY Route: PO  Start: 07/26/18 1230   Admin. Amount: 1 tablet (1 × 1 mg tablet)  Last Admin: 07/30/18 0736  Dispense Loc: [facility]       1433 (1 tablet)-Given       1609-Auto Hold       2143-Unhold        0827 (1 tablet)-Given        0849 (1 tablet)-Given        0755 (1 tablet)-Given        0736 (1 tablet)-Given           ondansetron (ZOFRAN-ODT) ODT tab 4 mg  Dose: 4 mg  Freq: EVERY 6 HOURS PRN Route: PO  PRN Reasons: nausea,vomiting  Start: 07/25/18 2309   Admin Instructions: This is Step 1 of nausea and vomiting management.  If nausea not resolved in 15 minutes, go to Step 2 prochlorperazine (COMPAZINE). Do not push through foil backing. Peel back foil and gently remove. Place on tongue immediately. Administration with liquid unnecessary  With dry hands, peel back foil backing and gently remove tablet; do not push oral disintegrating tablet through foil backing; administer immediately on tongue and oral disintegrating tablet dissolves in seconds; then swallow with saliva; liquid not required.    Admin. Amount: 1 tablet (1 × 4 mg tablet)  Dispense Loc: [facility] ADS 7B       0849-Auto Hold       1036-Unhold       1609-Auto Hold       2143-Unhold              Or  ondansetron (ZOFRAN) injection 4 mg  Dose: 4 mg  Freq: EVERY 6 HOURS PRN Route: IV  PRN Reasons: nausea,vomiting  Start: 07/25/18 2309   Admin Instructions: This is Step 1 of nausea and vomiting management.  If nausea not resolved in 15 minutes, go to Step 2 prochlorperazine (COMPAZINE).  Irritant. For ordered doses up to 4 mg, give IV Push  undiluted over 2-5 minutes.    Admin. Amount: 4 mg = 2 mL Conc: 4 mg/2 mL  Dispense Loc: [facility] ADS 7B  Infused Over: 2-5 Minutes  Volume: 2 mL       0849-Auto Hold       1036-Unhold       1609-Auto Hold       2143-Unhold               oxyCODONE IR (ROXICODONE) half-tab 2.5-5 mg  Dose: 2.5-5 mg  Freq: EVERY 3 HOURS PRN Route: PO  PRN Reason: moderate to severe pain  Start: 07/25/18 2309   Admin Instructions: Hold while on PCA or with regular IV opioid dosing.    Admin. Amount: 1-2 half-tab (1-2 × 2.5 mg half-tab)  Last Admin: 07/30/18 1030  Dispense Loc: [facility] ADS 7B       0849-Auto Hold       1036-Unhold       1609-Auto Hold       2143-Unhold        1350 (2.5 mg)-Given       2008 (5 mg)-Given        0009 (5 mg)-Given       0430 (5 mg)-Given       0910 (5 mg)-Given       1409 (5 mg)-Given       2116 (5 mg)-Given        0018 (5 mg)-Given       0504 (5 mg)-Given       0803 (5 mg)-Given       1053 (5 mg)-Given       1651 (5 mg)-Given       2058 (5 mg)-Given        0508 (5 mg)-Given       1030 (5 mg)-Given           polyethylene glycol (MIRALAX/GLYCOLAX) Packet 17 g  Dose: 17 g  Freq: DAILY PRN Route: PO  PRN Reason: constipation  Indications of Use: CONSTIPATION  Start: 07/25/18 2309   Admin Instructions: Give in 8 ounces of water, juice, or soda.  Hold for loose stools.  1 Packet = 17 grams. Mixed prescribed dose in 8 ounces of water. Follow with 8 oz. of water.    Admin. Amount: 17 g  Dispense Loc: [facility] ADS 7B       0849-Auto Hold       1036-Unhold       1609-Auto Hold       2143-Unhold               potassium chloride (KLOR-CON) Packet 20-40 mEq  Dose: 20-40 mEq  Freq: EVERY 2 HOURS PRN Route: ORAL OR FEED  PRN Reason: potassium supplementation  Start: 07/25/18 2309   Admin Instructions: Use if unable to tolerate tablets.    If Serum K+ 3.4-4.0, dose = 20 mEq x1. Recheck K+ level the next AM.  If Serum K+ 3.0-3.3, dose = 60 mEq po total dose (40 mEq x 1 followed in 2 hours by 20 mEq X1). Recheck K+ level 4  hours after dose and the next AM.  If Serum K+ 2.5-2.9, dose = 80 mEq po total dose (40 mEq Q2H x2). Recheck K+ level 4 hours after dose and the next AM.  If Serum K+ less than 2.5, See IV order.  Dissolve packet contents in 4-8 ounces of cold water or juice.    Admin. Amount: 20-40 mEq  Dispense Loc: [facility] ADS 7B       0849-Auto Hold       1036-Unhold       1609-Auto Hold       2143-Unhold               potassium chloride 10 mEq in 100 mL intermittent infusion with 10 mg lidocaine  Dose: 10 mEq  Freq: EVERY 1 HOUR PRN Route: IV  PRN Reason: potassium supplementation  Start: 07/25/18 2309   Admin Instructions: Infuse via PERIPHERAL LINE. Use potassium with lidocaine for pain with peripheral administration.  If Serum K+ 3.4-4.0, dose = 10 mEq/hr x2 doses. Recheck K+ level the next AM.  If Serum K+ 3.0-3.3, dose = 10 mEq/hr x4 doses (40 mEq IV total dose). Recheck K+ level 2 hours after dose and the next AM.  If Serum K+ less than 3.0, dose = 10 mEq/hr x6 doses (60 mEq IV total dose). Recheck K+ level 2 hours after dose and the next AM.    Admin. Amount: 10 mEq = 100 mL Conc: 10 mEq/100 mL  Dispense Loc: [facility]  Infused Over: 1 Hours  Volume: 100 mL       0849-Auto Hold       1036-Unhold       1609-Auto Hold       2143-Unhold               potassium chloride 10 mEq in 100 mL sterile water intermittent infusion (premix)  Dose: 10 mEq  Freq: EVERY 1 HOUR PRN Route: IV  PRN Reason: potassium supplementation  Start: 07/25/18 2309   Admin Instructions: Infuse via PERIPHERAL LINE or CENTRAL LINE. Use for central line replacement if patient weight less than 65 kg, if patient is on TPN with high potassium content or if unit does not stock 20 mEq bags.  If Serum K+ 3.4-4.0, dose = 10 mEq/hr x2 doses. Recheck K+ level the next AM.  If Serum K+ 3.0-3.3, dose = 10 mEq/hr x4 doses (40 mEq IV total dose). Recheck K+ level 2 hours after dose and the next AM.  If Serum K+ less than 3.0, dose = 10 mEq/hr x6 doses (60  mEq IV total dose). Recheck K+ level 2 hours after dose and the next AM.    Admin. Amount: 10 mEq = 100 mL Conc: 10 mEq/100 mL  Dispense Loc: [facility]  Infused Over: 60 Minutes  Volume: 100 mL       0849-Auto Hold       1036-Unhold       1609-Auto Hold       2143-Unhold               potassium chloride 20 mEq in 50 mL intermittent infusion  Dose: 20 mEq  Freq: EVERY 1 HOUR PRN Route: IV  PRN Reason: potassium supplementation  Start: 07/25/18 2309   Admin Instructions: Infuse via CENTRAL LINE Only.  May need EKG if less than 65 kg or on TPN - Max rate is 0.3 mEq/kg/hr for patients not on EKG monitoring.    If Serum K+ 3.4-4.0, dose = 20 mEq/hr x1 doses. Recheck K+ level the next AM.  If Serum K+ 3.0-3.3, dose = 20 mEq/hr x2 doses (40 mEq IV total dose).  Recheck K+ level 2 hours after dose and the next AM.  If Serum K+ less than 3.0, dose = 20 mEq/hr x3 doses (60 mEq IV total dose). Recheck K+ level 2 hours after dose and the next AM.    Admin. Amount: 20 mEq = 50 mL Conc: 20 mEq/50 mL  Dispense Loc: [facility]  Infused Over: 2 Hours  Volume: 50 mL       0849-Auto Hold       1036-Unhold       1609-Auto Hold       2143-Unhold               potassium chloride SA (K-DUR/KLOR-CON M) CR tablet 20-40 mEq  Dose: 20-40 mEq  Freq: EVERY 2 HOURS PRN Route: PO  PRN Reason: potassium supplementation  Start: 07/25/18 2309   Admin Instructions: Use if able to take PO.   If Serum K+ 3.4-4.0, dose = 20 mEq x1. Recheck K+ level the next AM.  If Serum K+ 3.0-3.3, dose = 60 mEq po total dose (40 mEq x1 followed in 2 hours by 20 mEq x1). Recheck K+ level 4 hours after dose and the next AM.  If Serum K+ 2.5-2.9, dose = 80 mEq po total dose (40 mEq Q2H x2). Recheck K+ level 4 hours after dose and the next AM.  If Serum K+ less than 2.5, See IV order.  DO NOT CRUSH.    Admin. Amount: 2-4 tablet (2-4 × 10 mEq tablet)  Last Admin: 07/28/18 6964  Dispense Loc: [facility] ADS 7B       0849-Auto Hold       1036-Unhold        1609-Auto Hold       2143-Unhold        1034 (20 mEq)-Given        1753 (20 mEq)-Given             sodium chloride (PF) 0.9% PF flush 3 mL  Dose: 3 mL  Freq: EVERY 8 HOURS Route: IK  Start: 07/26/18 2200   Admin Instructions: And Q1H PRN, to lock peripheral IV dormant line.    Admin. Amount: 3 mL  Last Admin: 07/30/18 1351  Dispense Loc: [facility] Floor Stock  Volume: 3 mL  POC: Post-procedure   Current Line: Peripheral IV 07/27/18 Left;Lateral Lower forearm      (2206)-Not Given        (0505)-Not Given       (1306)-Not Given       (2143)-Not Given        (0535)-Not Given       (1534)-Not Given       2324 (3 mL)-Given        0514 (3 mL)-Given       1422 (3 mL)-Given       2108 (3 mL)-Given        0542 (3 mL)-Given       1351 (3 mL)-Given       [ ] 2200           sodium chloride (PF) 0.9% PF flush 3 mL  Dose: 3 mL  Freq: EVERY 1 HOUR PRN Route: IK  PRN Reason: line flush  PRN Comment: for peripheral IV flush post IV meds  Start: 07/26/18 2159   Admin. Amount: 3 mL  Dispense Loc: [facility] Floor Stock  Volume: 3 mL  POC: Post-procedure               vancomycin (FIRVANQ) oral solution 125 mg  Dose: 125 mg  Freq: 4 TIMES DAILY Route: PO  Indications of Use: CLOSTRIDIUM DIFFICILE,COMMUNITY ACQUIRED PNEUMONIA  Start: 07/27/18 0830   Admin. Amount: 125 mg = 2.5 mL Conc: 50 mg/mL  Last Admin: 07/30/18 1349  Dispense Loc: [facility]  Volume: 2.5 mL        0842 (125 mg)-Given       1235 (125 mg)-Given       1709 (125 mg)-Given       2100 (125 mg)-Given        0849 (125 mg)-Given       1409 (125 mg)-Given       1752 (125 mg)-Given       2322 (125 mg)-Given        0803 (125 mg)-Given       1222 (125 mg)-Given       1649 (125 mg)-Given       2058 (125 mg)-Given        0736 (125 mg)-Given       1349 (125 mg)-Given       [ ] 1600       [ ] 2000           zinc oxide (DESITIN) 40 % ointment  Freq: 2 TIMES DAILY Route: Top  Start: 07/25/18 9372   Admin Instructions: Auto-sub for Boudreayx's Butt Paste    Last Admin: 07/30/18  0738  Dispense Loc: [facility]       0849-Auto Hold       1036-Unhold       (1106)-Not Given       1226 ( )-Given       1609-Auto Hold       2000 Auto Hold-Automatically Held       2143-Unhold        0828 ( )-Given       (2117)-Not Given        0859 ( )-Given       2030 ( )-Given        0757 ( )-Given       2100 ( )-Given        0738 ( )-Given       [ ] 2000          Discontinued Medications  Medications 07/24/18 07/25/18 07/26/18 07/27/18 07/28/18 07/29/18 07/30/18         Freq: CONTINUOUS PRN Route: XX  Start: 07/26/18 1029   End: 07/30/18 0836   Admin Instructions: - All continuous nerve block medications must be preservative free  - All orders must be compounded in preservative free Normal Saline    Dispense Loc: [facility]           0836-Med Discontinued         Freq: 2 TIMES DAILY Route: Top  Start: 07/29/18 0915   End: 07/29/18 1021   Admin Instructions: Apply to periarea    Dispense Loc: [facility]          (1016)-Not Given       1021-Med Discontinued          Rate: 50 mL/hr Dose: 1000 mL  Freq: CONTINUOUS Route: IV  Last Dose: 1,000 mL (07/28/18 0009)  Start: 07/27/18 1730   End: 07/28/18 0901   Admin Instructions: Saline lock when taking PO well.    Admin. Amount: 1,000 mL  Last Admin: 07/28/18 0009  Dispense Loc: [facility] Floor Stock  Volume: 1,000 mL        1732 (1,000 mL)-Rate/Dose Change        0009 (1,000 mL)-New Bag       0901-Med Discontinued           Rate: 100 mL/hr Dose: 1000 mL  Freq: CONTINUOUS Route: IV  Last Dose: 1,000 mL (07/27/18 0756)  Start: 07/26/18 0000   End: 07/27/18 1719   Admin Instructions: Saline lock when taking PO well.    Admin. Amount: 1,000 mL  Last Admin: 07/27/18 0756  Dispense Loc: [facility] Floor Stock  Volume: 1,000 mL       0032 (1,000 mL)-New Bag       2219 (1,000 mL)-Rate/Dose Verify        0756 (1,000 mL)-New Bag       1719-Med Discontinued            Dose: 0.1-0.4 mg  Freq: EVERY 2 MIN PRN Route: IV  PRN Reason: opioid reversal  Start:  07/26/18 2006   End: 07/27/18 2005   Admin Instructions: For apnea or imminent respiratory arrest: give 0.4 mg IV undiluted Q 2 minutes PRN until desired degree of reversal is obtained, stop opioid and notify provider. Continue monitoring until discharge criteria are met for a minimum of 2 hours.  For severe sedation, decrease in respiratory depth, quality or respiratory rate less than 8: give 0.1 mg IV Q 2 minutes x 3 doses, stop opioid and notify provider.  Try to minimize reversal of analgesia especially in end-of-life patients  For ordered doses up to 2mg give IVP. Give each 0.4mg over 15 seconds in emergency situations. For non-emergent situations further dilute in 9mL of NS to facilitate titration of response.    Admin. Amount: 0.1-0.4 mg = 0.25-1 mL Conc: 0.4 mg/mL  Dispense Loc: [facility] ADS 7B  Volume: 1 mL        2005-Med Discontinued            Dose: 8 mL/hr  Freq: Elastomeric Pump Route: IR  Last Dose: Stopped (07/30/18 0027)  Start: 07/26/18 1015   End: 07/30/18 0836   Admin Instructions: STOP infusion  via RIGHT Fascia Iliaca Catheter (Already clamped on 7/29/18 1230 by [facility]. Unable to modify rate above in order). This is a clamp trial.    Last Admin: 07/29/18 0805  Dispense Loc: [facility]  Volume: 750 mL   Mixture Administration Information:   Medication Type Amount   ROPivacaine 0.2% Medications 750 mL   ON-Q C-Bloc select flow ([id number] holds 600-750 mL) single cath disposable pump JOHNSON Base 1 Device               1225 (8 mL/hr)-New Bag       [ ] 1600       1714 (8 mL/hr)-Rate/Dose Change       2055 (8 mL/hr)-Rate/Dose Verify       2345 (8 mL/hr)-Rate/Dose Verify               0830 (8 mL/hr)-Rate/Dose Verify       0831 (8 mL/hr)-Rate/Dose Verify       0832 (8 mL/hr)-Rate/Dose Verify       1021 (8 mL/hr)-Rate/Dose Verify [C]       1731 (8 mL/hr)-Rate/Dose Verify               0010 (8 mL/hr)-Rate/Dose Verify              0850 (8 mL/hr)-Rate/Dose Verify                      1755 (8 mL/hr)-Rate/Dose Verify               0021 (8 mL/hr)-Rate/Dose Verify       0756 (8 mL/hr)-Rate/Dose Verify       0805 (8 mL/hr)-New Bag       (9904)-Not Given [C]        0027-Hold [C]       (0739)-Not Given       0836-Med Discontinued    Medications 07/24/18 07/25/18 07/26/18 07/27/18 07/28/18 07/29/18 07/30/18

## 2018-07-25 NOTE — ED PROVIDER NOTES
History     Chief Complaint   Patient presents with     Fall     Hip Pain     HPI  Elizabeth Taylor is a 78 year old female with history of anal cancer who presents to the emergency department for the evaluation of hip pain.  The patient reports that she sustained a ground-level mechanical fall approximately 2 weeks ago while ambulating through her living room when her hip gave out, and she took the brunt of her fall on her right hip.  The patient reports significant and immediate pain following the fall, and she has been unable to ambulate without assistance since.  She denies any previous history of injury or surgery to the right hip, and no distal numbness/weakness.      PAST MEDICAL HISTORY:   Past Medical History:   Diagnosis Date     Anal cancer (H)      Endometriosis, site unspecified      Thyroiditis, unspecified     Thryoiditis-resolved       PAST SURGICAL HISTORY:   Past Surgical History:   Procedure Laterality Date     APPENDECTOMY      as a child     COLONOSCOPY N/A 4/13/2017    Procedure: COLONOSCOPY;  Surgeon: Juan Dos Santos MD;  Location: [facility]     INSERT PORT VASCULAR ACCESS Right 2/8/2018    Procedure: INSERT PORT VASCULAR ACCESS;  Place Single Lumen Venous Chest Port Placement;  Surgeon: Zaire Moreira PA-C;  Location: [facility]     SURGICAL HISTORY OF -       endometriosis       FAMILY HISTORY:   Family History   Problem Relation Age of Onset     Cancer Sister      Cancer Maternal Grandmother      lymphoma     HEART DISEASE Father      MI     Uterine Cancer Niece        SOCIAL HISTORY:   Social History   Substance Use Topics     Smoking status: Never Smoker     Smokeless tobacco: Never Used     Alcohol use No       Current Discharge Medication List      CONTINUE these medications which have NOT CHANGED    Details   acetaminophen (TYLENOL) 325 MG tablet Take 2 tablets (650 mg) by mouth every 4 hours as needed for pain    Associated Diagnoses: Closed fracture of one rib of  right side, initial encounter      artificial saliva (BIOTENE DRY MOUTHWASH) LIQD liquid Swish and spit 5-10 mLs in mouth 4 times daily as needed for dry mouth    Associated Diagnoses: Mouth dryness      calcium-vitamin D (CALTRATE) 600-400 MG-UNIT per tablet Take 1 tablet by mouth daily  Qty: 60 tablet    Associated Diagnoses: Age-related osteoporosis with current pathological fracture, initial encounter      diphenoxylate-atropine (LOMOTIL) 2.5-0.025 MG per tablet Take 1 tablet by mouth 4 times daily as needed for diarrhea . If having less than two bowel movements daily, DO NOT GIVE  Qty: 40 tablet, Refills: 0    Associated Diagnoses: Diarrhea, unspecified type; Malignant neoplasm of anal canal (H)      Hydrocortisone (BUTT PASTE, WITH H.C,) Use topically 3-4 times daily  Qty: 1 Tube, Refills: 1    Comments: Not sure the size but can be a big tub or tube.  Associated Diagnoses: Vaginitis and vulvovaginitis      loperamide (LOPERAMIDE A-D) 2 MG tablet Take 1 tablet (2 mg) by mouth 4 times daily as needed for diarrhea . If having less than two bowel movements daily, DO NOT GIVE  Qty: 60 tablet, Refills: 3    Associated Diagnoses: Malignant neoplasm of anal canal (H)      menthol-zinc oxide (CALMOSEPTINE) 0.44-20.625 % OINT ointment Apply thick layer to irritated perianal skin 4 times per day and after bowel movements.  Qty: 1 Tube, Refills: 3    Associated Diagnoses: Malignant neoplasm of anal canal (H)      methocarbamol (ROBAXIN) 500 MG tablet Take 1 tablet (500 mg) by mouth 2 times daily as needed for muscle spasms  Qty: 120 tablet, Refills: 0    Associated Diagnoses: Closed fracture of one rib of right side, initial encounter      simethicone (MYLICON) 40 MG/0.6ML suspension Take 0.6 mLs (40 mg) by mouth every 6 hours as needed for cramping    Associated Diagnoses: Malignant neoplasm of anal canal (H); Flatulence, eructation and gas pain      traMADol (ULTRAM) 50 MG tablet Take 1 tablet (50 mg) by mouth every 6  "hours  Qty: 20 tablet, Refills: 0    Associated Diagnoses: Closed fracture of one rib of right side, initial encounter      vitamin B complex with vitamin C (VITAMIN  B COMPLEX) TABS tablet Take 1 tablet by mouth daily      ondansetron (ZOFRAN-ODT) 8 MG ODT tab Take 1 tablet (8 mg) by mouth every 8 hours as needed For nausea  Qty: 60 tablet    Associated Diagnoses: Malignant neoplasm of anal canal (H)         STOP taking these medications       multivitamin, therapeutic (THERA-VIT) TABS tablet Comments:   Reason for Stopping:                  Allergies   Allergen Reactions     Darvon [Propoxyphene]      Had reaction in teen years     Penicillins      As a child     Ketoconazole Rash        I have reviewed the Medications, Allergies, Past Medical and Surgical History, and Social History in the Epic system.    Review of Systems   Constitutional: Negative for fever.   Respiratory: Negative for shortness of breath.    Cardiovascular: Negative for chest pain.   Gastrointestinal: Negative for abdominal pain.   Musculoskeletal: Positive for joint swelling.        R hip pain   Skin: Negative for wound.   All other systems reviewed and are negative.      Physical Exam   BP: 133/84  Pulse: 90  Heart Rate: 90  Temp: 98.1  F (36.7  C)  Resp: 16  Height: 167.6 cm (5' 6\")  Weight: 47.9 kg (105 lb 9.6 oz)  SpO2: 97 %      Physical Exam   Constitutional: She is oriented to person, place, and time. Vital signs are normal. She appears well-developed and well-nourished.  Non-toxic appearance. She does not appear ill. No distress.   HENT:   Head: Normocephalic and atraumatic.   Mouth/Throat: Oropharynx is clear and moist. No oropharyngeal exudate.   Eyes: Conjunctivae and EOM are normal. Pupils are equal, round, and reactive to light. No scleral icterus.   Neck: Normal range of motion. Neck supple. No JVD present. No tracheal deviation present. No thyromegaly present.   Cardiovascular: Normal rate, regular rhythm, normal heart sounds " and intact distal pulses.  Exam reveals no gallop and no friction rub.    No murmur heard.  Pulmonary/Chest: Effort normal and breath sounds normal. No respiratory distress. She exhibits no tenderness.   Abdominal: Soft. Bowel sounds are normal. She exhibits no distension and no mass. There is no tenderness. There is no rebound and no guarding.   Musculoskeletal: She exhibits no edema.        Right hip: She exhibits decreased range of motion, tenderness and bony tenderness.        Cervical back: She exhibits no tenderness.        Thoracic back: She exhibits no tenderness.        Lumbar back: She exhibits no tenderness.        Legs:  Lymphadenopathy:     She has no cervical adenopathy.   Neurological: She is alert and oriented to person, place, and time. She has normal strength. No cranial nerve deficit or sensory deficit.   Skin: Skin is warm and dry. No rash noted. No erythema. No pallor.   Psychiatric: She has a normal mood and affect. Her behavior is normal.   Nursing note and vitals reviewed.      ED Course     ED Course     Procedures                   Labs Ordered and Resulted from Time of ED Arrival Up to the Time of Departure from the ED   COMPREHENSIVE METABOLIC PANEL - Abnormal; Notable for the following:        Result Value    Albumin 3.2 (*)     All other components within normal limits   CBC WITH PLATELETS - Abnormal; Notable for the following:     Hemoglobin 11.5 (*)     All other components within normal limits   CBC WITH PLATELETS DIFFERENTIAL   INR   ABO/RH TYPE AND SCREEN           XR Femur Right 2 Views (Final result) Result time: 07/25/18 21:06:42     Final result by Opal Rodriguez MD (07/25/18 21:06:42)     Impression:     IMPRESSION: Redemonstration of right femoral neck fracture with  superior displacement of the distal fragment.     I have personally reviewed the examination and initial interpretation  and I agree with the findings.    OPAL RODRIGUEZ MD     Narrative:      XR FEMUR RT 2 VW  7/25/2018 6:45 PM      HISTORY: trauma;     COMPARISON: Earlier today    FINDINGS: Redemonstration of right femoral neck fracture with superior  displacement of the distal fragment. No other fractures identified.                 XR Chest 1 View (Final result) Result time: 07/25/18 21:06:04     Procedure changed from Chest XR,  PA & LAT          Final result by Opal Rodriguez MD (07/25/18 21:06:04)     Impression:     IMPRESSION:   1. Subacute right-sided rib fractures with no acute displaced fracture  identified.  2. Bilateral trace pleural effusions.    I have personally reviewed the examination and initial interpretation  and I agree with the findings.    OPAL RODRIGUEZ MD     Narrative:     XR CHEST 1 VW  7/25/2018 6:44 PM      HISTORY: trauma;     COMPARISON: 4/2/2018    FINDINGS: Right-sided Port-A-Cath tip projects over the upper right  atrium. Cardiac silhouette is within normal limits. No pneumothorax.  Bilateral trace pleural effusions. Subacute right-sided rib fractures.  No acute displaced fracture.                 XR Pelvis w Hip Right G/E 2 Views (Final result) Result time: 07/25/18 18:29:50     Procedure changed from XR Hip Right 2-3 Views          Final result by Opal Rodriguez MD (07/25/18 18:29:50)     Impression:     Impression:    Displaced, foreshortened right femoral neck fracture.    I have personally reviewed the examination and initial interpretation  and I agree with the findings.    OPAL RODRIGUEZ MD     Narrative:     Exam:  XR PELVIS AND HIP RIGHT 2 VIEWS, 7/25/2018 5:52 PM    History: Trauma;     Comparison:  Pelvis MRI 5/25/2018, PET/CT 5/25/2018.    Findings:  AP and crosstable lateral views of the right hip.  Displaced, foreshortened right femoral neck fracture. Remaining  osseous structures are intact. Low lumbar predominant facet  arthropathy and mild degenerative changes in the left hip. Visualized  soft tissues are  unremarkable.            Assessments & Plan (with Medical Decision Making)   This patient presented to the Emergency Department complaining of right hip pain.  She had fallen 2 weeks ago and has not been mobile over these past 2 weeks at home.  She is neurovascularly intact distally, but x-ray demonstrates displaced femoral neck fracture.  Orthopedics and trauma were both notified as was anesthesia to see about placement of femoral nerve block.  I did discuss possible nerve block with orthopedics and they are comfortable with this occurring.  She was given 0.5 mg of Dilaudid IV and preop blood work was sent.  Patient will be admitted to the trauma service for further treatment.    This part of the medical record was transcribed by Ladarius Christie, Medical Scribe, from a dictation done by Aakash Gutierrez MD.       I have reviewed the nursing notes.    I have reviewed the findings, diagnosis, plan and need for follow up with the patient.    Current Discharge Medication List          Final diagnoses:   Closed displaced fracture of neck of right femur (H)   I, Chidi Renteria, am serving as a trained medical scribe to document services personally performed by Aakash Gutierrez MD, based on the provider's statements to me.      IAakash MD, was physically present and have reviewed and verified the accuracy of this note documented by Chidi Renteria.       7/25/2018   [facility], [place], EMERGENCY DEPARTMENT     Aakash Gutierrez MD  07/27/18 1042

## 2018-07-25 NOTE — IP AVS SNAPSHOT
` `           [address] [facility]: [phone]                 INTERAGENCY TRANSFER FORM - NOTES (H&P, Discharge Summary, Consults, Procedures, Therapies)   2018                    Hospital Admission Date: 2018  GERHARD SU   : 1940  Sex: Female        Patient PCP Information     Provider PCP Type    Baron Seth MD General         History & Physicals      H&P by Quin Jackson MD at 2018  6:33 PM     Author:  Quin Jackson MD Service:  Trauma Author Type:  Physician    Filed:  2018 12:51 AM Date of Service:  2018  6:33 PM Creation Time:  2018  6:33 PM    Status:  Attested :  Quin Jackson MD (Physician)    Cosigner:  Aakash Collins MD at 2018  1:27 PM        Attestation signed by Aakash Collins MD at 2018  1:27 PM        Trauma Staff  I examined patient and reviewed data. Time of eval 0600, .  I discussed with Dr Jackson and agree with her assessment and plan.    [facility], [place]    History and Physical: Trauma Service       Date of Admission:  2018    Time of Admission/Consult Request (page/call): 6:05    Time of my evaluation: 6:30  Consulting services:  Orthopedics - Emergent consult (within 30 mins): Called by ED    Assessment & Plan   Trauma mechanism: Mechanical Fall  Time/date of injury: 2 weeks ago  Known Injuries:  1. Right femoral neck fracture  Other diagnoses:   1. Anal cancer, s/p chemoradiation    Plan:  1. Admit to Trauma service, attending Dr. Collins  2. Orthopedics consult for evaluation of femoral neck fracture[KC1.1] --planning likely OR tomorrow[KC1.2]  3. Pain control with scheduled tylenol, lidoderm patch, oxycodone, IV dilaudid for breakthrough[KC1.1]  4. US LE to assess for DVT with swelling[KC1.2]  5. Preop workup including EKG and CXR done in ED, no ST changes but notable for irregular rhythm, CXR  with[KC1.1] chronic rib fracture and pleural effusions  6. NPO after midnight[KC1.2]  7. Frequent turning to avoid pressure injury  8. PT/OT consult[KC1.1]    Cleared for surgery from a trauma standpoint, ok to go to OR with orthopedics[KC1.3]      Code status: Full code confirmed with patient in presence of[KC1.1] sister and brother in law[KC1.2]    General Cares:  GI Prophylaxis: n/a  DVT Prophylaxis: SCDs, lovenox   Date of last stool/Bowel Regimen: yesterday  Pulmonary toilet:IS/ deep breathing cough    ETOH: This patient was asked if in the last 3-6 months there has been a time when she had 4 or more drinks in a single day/outing.. Patient answer to the screening question was in the negative. No intervention needed.  Primary Care Physician   Felisha Davis    Chief Complaint   Mechanical fall, R hip pain with inability to weight bear    History is obtained from the patient    History of Present Illness   Elizabeth Taylor is a 78 year old female who presents to the ED[KC1.1] for severe right hip pain.[KC1.2]  She comes in after a fall[KC1.1] at home two weeks ago.  She was walking at her home and fell, she felt immediate severe stabbing pain in her R hip.  She was not able to ambulate on this. She was able to crawl down the stairs to find her phone and get in bed.  Her daughter came over to help her, but if she wasn't moving it didn't hurt, she though she pulled a muscle.  She was able to stand and bear a tiny bit of weight on the leg.  But the pain kept getting worse and so she presented to the hospital today.  She denies any other injuries or other pain.  She has no nausea.  Occasionally has some fullness in her abdomen.  She has a history of stage III anal cancer which was treated with chemoradiation which finished in March.  SHe has a previous fall in March with rib fractures and hemopneumothorax.  She has since been recovering at her family's.  Prior to this fall she was ambulatory but weak and  debilitated from chemo/rads.[KC1.2]      Past Medical History[KC1.1]    I have reviewed this patient's medical history and updated it with pertinent information if needed.[KC1.2]   Past Medical History:   Diagnosis Date     Anal cancer (H)      Endometriosis, site unspecified      Thyroiditis, unspecified     Thryoiditis-resolved[KC1.4]       Past Surgical History[KC1.1]   I have reviewed this patient's surgical history and updated it with pertinent information if needed.[KC1.2]  Past Surgical History:   Procedure Laterality Date     APPENDECTOMY      as a child     COLONOSCOPY N/A 4/13/2017    Procedure: COLONOSCOPY;  Surgeon: Juan Dos Santos MD;  Location: [facility]     INSERT PORT VASCULAR ACCESS Right 2/8/2018    Procedure: INSERT PORT VASCULAR ACCESS;  Place Single Lumen Venous Chest Port Placement;  Surgeon: Zaire Moreira PA-C;  Location: [facility]     SURGICAL HISTORY OF -       endometriosis[KC1.4]     Prior to Admission Medications   Prior to Admission Medications   Prescriptions Last Dose Informant Patient Reported? Taking?   Hydrocortisone (BUTT PASTE, WITH H.C,)   No No   Sig: Use topically 3-4 times daily   acetaminophen (TYLENOL) 325 MG tablet   No No   Sig: Take 2 tablets (650 mg) by mouth every 4 hours as needed for pain   artificial saliva (BIOTENE DRY MOUTHWASH) LIQD liquid   No No   Sig: Swish and spit 5-10 mLs in mouth 4 times daily as needed for dry mouth   Patient not taking: Reported on 6/22/2018   calcium-vitamin D (CALTRATE) 600-400 MG-UNIT per tablet   No No   Sig: Take 1 tablet by mouth daily   Patient not taking: Reported on 6/22/2018   diphenoxylate-atropine (LOMOTIL) 2.5-0.025 MG per tablet   No No   Sig: Take 1 tablet by mouth 4 times daily as needed for diarrhea . If having less than two bowel movements daily, DO NOT GIVE   Patient not taking: Reported on 6/22/2018   loperamide (LOPERAMIDE A-D) 2 MG tablet   No No   Sig: Take 1 tablet (2 mg) by mouth 4 times daily as  needed for diarrhea . If having less than two bowel movements daily, DO NOT GIVE   Patient not taking: Reported on 6/22/2018   menthol-zinc oxide (CALMOSEPTINE) 0.44-20.625 % OINT ointment   No No   Sig: Apply thick layer to irritated perianal skin 4 times per day and after bowel movements.   methocarbamol (ROBAXIN) 500 MG tablet   No No   Sig: Take 1 tablet (500 mg) by mouth 2 times daily as needed for muscle spasms   multivitamin, therapeutic (THERA-VIT) TABS tablet   No No   Sig: Take 1 tablet by mouth daily   ondansetron (ZOFRAN-ODT) 8 MG ODT tab   No No   Sig: Take 1 tablet (8 mg) by mouth every 8 hours as needed For nausea   Patient not taking: Reported on 6/22/2018   simethicone (MYLICON) 40 MG/0.6ML suspension   No No   Sig: Take 0.6 mLs (40 mg) by mouth every 6 hours as needed for cramping   Patient not taking: Reported on 6/22/2018   traMADol (ULTRAM) 50 MG tablet   No No   Sig: Take 1 tablet (50 mg) by mouth every 6 hours   vitamin B complex with vitamin C (VITAMIN  B COMPLEX) TABS tablet   Yes No   Sig: Take 1 tablet by mouth daily      Facility-Administered Medications: None     Allergies   Allergies   Allergen Reactions     Darvon [Propoxyphene]      Had reaction in teen years     Penicillins      As a child     Ketoconazole Rash       Social History   Social History     Social History     Marital status: Single     Spouse name: N/A     Number of children: N/A     Years of education: N/A     Occupational History     Not on file.     Social History Main Topics     Smoking status: Never Smoker     Smokeless tobacco: Never Used     Alcohol use No     Drug use: No     Sexual activity: No     Other Topics Concern     Not on file     Social History Narrative       Family History[KC1.1]   I have reviewed this patient's family history and updated it with pertinent information if needed.[KC1.2]   Family History   Problem Relation Age of Onset     Cancer Sister      Cancer Maternal Grandmother      lymphoma      HEART DISEASE Father      MI     Uterine Cancer Niece[KC1.4]        Review of Systems   CONSTITUTIONAL: No fever, chills, sweats, fatigue   EYES: no visual blurring, no double vision or visual loss  ENT: no decrease in hearing, no tinnitus, no vertigo, no hoarseness  RESPIRATORY: no shortness of breath, no cough, no sputum   CARDIOVASCULAR: no palpitations, no chest  pain, no exertional chest pain or pressure  GASTROINTESTINAL: no nausea or vomiting, or abd pain  GENITOURINARY: no dysuria, no frequency or hesitancy, no hematuria  MUSCULOSKELETAL: no weakness, no redness, no swelling, no joint pain,   SKIN: no rashes, ecchymoses, abrasions or lacerations  NEUROLOGIC: no numbness or tingling of hands, no numbness or tingling  of feet, no syncope, no tremors or weakness  PSYCHIATRIC: no sleep disturbances, no anxiety or depression    Physical Exam   Temp: 98.1  F (36.7  C) Temp src: Oral BP: 133/84   Heart Rate: 90 Resp: 16 SpO2: 100 % O2 Device: None (Room air)    Vital Signs with Ranges  Temp:  [98.1  F (36.7  C)] 98.1  F (36.7  C)  Heart Rate:  [90] 90  Resp:  [16] 16  BP: (133)/(84) 133/84  SpO2:  [97 %-100 %] 100 % 0 lbs 0 oz    Primary Survey:  Airway: patient talking  Breathing: symmetric respiratory effort bilaterally  Circulation: central pulses present and peripheral pulses present  Disability: Pupils - left 4 mm and brisk, right 4 mm and brisk     [place] Coma Scale - Total[KC1.1] 15[KC1.2]/15  Eye Response (E):[KC1.1] 4[KC1.2]  4= spontaneous,  3= to verbal/voice, 2=  to pain, 1= No response   Verbal Response (V):[KC1.1] 5[KC1.2]   5= Orientated, converses,  4= Confused, converses, 3= Inappropriate words,  2= Incomprehensible sounds,  1=No response   Motor Response (M):[KC1.1] 6[KC1.2]   6= Obeys commands, 5= Localizes to pain, 4= Withdrawal to pain, 3=Fexion to pain, 2= Extension to pain, 1= No response    Secondary Survey:  General: alert, oriented to person, place, time  Head: atraumatic, normocephalic,  trachea midline  Eyes: PERRLA, pupils[KC1.1] 3[KC1.2]mm, EOMI, corneas and conjunctivae clear  Ears: pearly grey bilateral TMs and non-inflamed external ear canals  Nose: nares patent, no drainage, nasal septum non-tender  Mouth/Throat: no exudates or erythema,  no dental tenderness or malocclusions, no tongue lacerations  Neck:[KC1.1]  No[KC1.2] cervical collar present. No midline posterior tenderness, full AROM without pain or tenderness   Chest/Pulmonary: normal respiratory rate and rhythm,  clear breath sounds[KC1.1] BL but decreased at bases[KC1.2], no wheezes, rales or rhonchi, no chest wall tenderness or deformities,   Cardiovascular: S1, S2,  normal and regular rate and rhythm, no murmurs  Abdomen: soft, non-tender, no guarding, no rebound tenderness and no tenderness to palpation  : normal external genitalia, pelvis stable to lateral compression  no farias,   Back/Spine: no deformity, no midline tenderness, no sacral tenderness,  no step-offs and no abrasions or contusions  Musculoskel/Extremities:[KC1.1] RLE with swelling of the hip joint, and ankle.  Some mild erythema of the foot, not blanching.  Does not appear cellulitis. No pain at knee joint or long bones.  TTP over right hip.  OTherwise[KC1.2] normal extremities, full AROM of major joints without tenderness, edema, erythema, ecchymosis, or abrasions.    Hand: no gross deformities of hands or fingers. Full AROM of hand and fingers in flexion and extension.  strength equal and symmetric.   Skin: no rashes, laceration, ecchymosis, skin warm and dry.   Neuro: PERRLA, alert, oriented x[KC1.1] 4[KC1.2]. CN II-XII grossly intact. No focal deficits. Strength[KC1.1] 5[KC1.2]/5 x[KC1.1] 3[KC1.2] extremities.[KC1.1]  Right leg with intact strength at ankle, other joints not tested due to pain.[KC1.2]  Sensation intact.  Psychiatric: affect/mood normal, cooperative, normal judgement/insight and memory intact  # Pain Assessment:  Current Pain Score[KC1.1]  7/25/2018[KC1.2]   Patient currently in pain? yes   Pain score (0-10) -   Pain location[KC1.1] Right hip[KC1.2]   Pain descriptors Discomfort[KC1.1]   - Elizabeth is experiencing pain due to right femoral neck fracture Pain management was discussed with Elizabeth and her sister and the plan was created in a collaborative fashion.  Elizabeth's response to the current recommendations: engaged  - Please see the plan for pain management as documented above[KC1.2]      Data   UA RESULTS:  Recent Labs   Lab Test  04/02/18   1200   COLOR  Yellow   APPEARANCE  Cloudy   URINEGLC  Negative   URINEBILI  Negative   URINEKETONE  Negative   SG  1.020   UBLD  Small*   URINEPH  5.5   PROTEIN  30*   NITRITE  Negative   LEUKEST  Large*   RBCU  27*   WBCU  40*[KC1.1]     I personally reviewed the EKG tracing showing Sinus rhythm with frequent PACs.  .  Results for orders placed or performed during the hospital encounter of 07/25/18 (from the past 24 hour(s))   XR Pelvis w Hip Right G/E 2 Views    Narrative    Exam:  XR PELVIS AND HIP RIGHT 2 VIEWS, 7/25/2018 5:52 PM    History: Trauma;     Comparison:  Pelvis MRI 5/25/2018, PET/CT 5/25/2018.    Findings:  AP and crosstable lateral views of the right hip.  Displaced, foreshortened right femoral neck fracture. Remaining  osseous structures are intact. Low lumbar predominant facet  arthropathy and mild degenerative changes in the left hip. Visualized  soft tissues are unremarkable.      Impression    Impression:    Displaced, foreshortened right femoral neck fracture.    I have personally reviewed the examination and initial interpretation  and I agree with the findings.    SHARON PAREKH MD   Comprehensive metabolic panel   Result Value Ref Range    Sodium 138 133 - 144 mmol/L    Potassium 3.9 3.4 - 5.3 mmol/L    Chloride 103 94 - 109 mmol/L    Carbon Dioxide 29 20 - 32 mmol/L    Anion Gap 6 3 - 14 mmol/L    Glucose 83 70 - 99 mg/dL    Urea Nitrogen 19 7 - 30 mg/dL    Creatinine 0.62 0.52 -  1.04 mg/dL    GFR Estimate >90 >60 mL/min/1.7m2    GFR Estimate If Black >90 >60 mL/min/1.7m2    Calcium 9.5 8.5 - 10.1 mg/dL    Bilirubin Total 0.4 0.2 - 1.3 mg/dL    Albumin 3.2 (L) 3.4 - 5.0 g/dL    Protein Total 7.4 6.8 - 8.8 g/dL    Alkaline Phosphatase 126 40 - 150 U/L    ALT 19 0 - 50 U/L    AST 18 0 - 45 U/L   INR   Result Value Ref Range    INR 1.05 0.86 - 1.14   ABO/Rh type and screen   Result Value Ref Range    ABO PENDING     Antibody Screen PENDING     Test Valid Only At          [facility]    Specimen Expires 07/28/2018    EKG 12 lead   Result Value Ref Range    Interpretation ECG Click View Image link to view waveform and result    XR Chest 1 View    Impression    IMPRESSION:   1. Subacute right-sided rib fractures with no acute displaced fracture  identified.  2. Bilateral trace pleural effusions.   XR Femur Right 2 Views    Narrative    XR FEMUR RT 2 VW  7/25/2018 6:45 PM      HISTORY: trauma;     COMPARISON: Earlier today    FINDINGS: Redemonstration of right femoral neck fracture with superior  displacement of the distal fragment. No other fractures identified.      Impression    IMPRESSION: Redemonstration of right femoral neck fracture with  superior displacement of the distal fragment.[KC1.2]        Studies:  XR Pelvis w Hip Right G/E 2 Views   Final Result   Impression:     Displaced, foreshortened right femoral neck fracture.      I have personally reviewed the examination and initial interpretation   and I agree with the findings.      SHARON PAREKH MD      XR Femur Right 2 Views    (Results Pending)   XR Chest 1 View    (Results Pending)       Quin Jackson[KC1.1]       Revision History        User Key Date/Time User Provider Type Action    > KC1.3 7/26/2018 12:51 AM Quin Jackson MD Physician Sign     KC1.4 7/25/2018  7:52 PM Quin Jackson MD Physician Sign     KC1.2 7/25/2018  7:42 PM Quin Jackson MD  Physician      KC1.1 7/25/2018  6:33 PM Quin Jackson MD Physician                      Discharge Summaries      Discharge Summaries by Hugo Farrell NP at 7/27/2018  2:14 PM     Author:  Hugo Farrell NP Service:  Trauma Author Type:  Nurse Practitioner    Filed:  7/30/2018  1:19 PM Date of Service:  7/27/2018  2:14 PM Creation Time:  7/27/2018  2:14 PM    Status:  Cosign Needed :  Hugo Farrell NP (Nurse Practitioner)    Cosign Required:  Yes             [facility], [place]    Discharge Summary  Trauma Surgery Service    Date of Admission:  7/25/2018  Date of Discharge:[KH1.1]  7/30/2018[SP1.1]  Discharging Provider:[KH1.1] Eric Farrell NP-BEBETO[SP1.2]  Date of Service (when I saw the patient):[KH1.1] 07/30/18[SP1.1]    Primary Provider: Baron Seth  Primary Care clinic: [address] [place] [address]  Phone: [phone]  Fax number: [phone][KH1.1]     Discharge Diagnoses   1. S/p ground level fall 2 weeks prior   2. Traumatic right femoral neck fracture s/p right hemiarthroplasty 7/26/18  3. Acute traumatic pain       Other diagnoses:  1. Stage III anal cancer s/p Chemo and radiation   2. Limited mobility 2/2 # 1   3. Recent catheter related wound due to perianal radiation   4. Hx of subacute right sided rib fractures 8-10 (dx 3/18)  5. Peripheral edema   6. C. Diff diarrhea       Hospital Course[KH1.2]  Elizabeth Taylor is a 78 year old female who presented on 7/26/18 for severe right hip pain after sustaining a ground level fall 2 weeks prior.   She was able to ambulate and weight bear in small limited interval however her mobility was severely limited. She did not seek care immediately as she felt she likely has muscle strain however her pain progressively worsened thus prompting her to present for evaluation where she was found to have a right femoral neck fractures.     Traumatic Injury: Fall   The patient sustained the above  injury as a result of ground level mechanical fall 2 weeks prior to her presentation. She was seen by the Trauma Resource Nurse and injury prevention education was performed.  The mechanism of injury and factors contributing to the accident were discussed with the patient.  Strategies on how to prevent future accidents were reviewed.  The patient underwent tertiary examination to evaluate for additional injuries.  The systematic review did not find any other injuries.    Right femoral neck fracture s/p right hemiarthroplasty 7/26/18[KH1.3]    Ms.[KH1.2]  Claudia was evaluated by Orthopedics and underwent right hemiarthroplasty by Dr Phan on 7/26/18. Please see operative note for details regarding the procedure. She will need Lovenox for DVT prophylaxis for 6 weeks. She is recommended to Weight bearing as tolerated but will need to follow posterior hip precautions for 3 months. She  is requested to follow up in the Orthopedic Clinic in 2 weeks. he  was evaluated by physical and occupational therapies during her hospitalization.  Please see summary of most current therapy notes below.     C. Diff diarrhea: She was noted to have loose stools on day one of her hospital stay. She did not have abdominal pain or fever or leukocytosis but C. Diff PCR was sent and positive. She was started on oral vancomycin for treatment of C. Diff colitis.[KH1.3]  She will need 10 day total course[KH1.2].[SP1.2]     Acute pain[KH1.1].  She had pain as a result of her right hip fracture. During her hospitalization we achieved pain control with a multi-modality approach. See below for her pain regimen.[KH1.2] We anticipate that they will taper off this regimen over the next several weeks..  # Discharge Pain Plan:[KH1.1]   - During her hospitalization, Elizabeth experienced pain due to her femur fracture.  The pain plan for discharge was discussed with Elizabeth and the plan was created in a collaborative fashion.    - Opioids prescribed on  discharge: oxycodone  - Bowel regimen: not needed  - Pharmacologic adjuvants:  Acetaminophen, Lidocaine patch   - Non-pharmacologic adjuvants: Heat and Ice[SP1.2]     Palliative Care Consult  The palliative care team was consulted to assist in the care and future life planning regarding the changes the above injuries have created. Often this sort of injury is a clear marker of general decline in the aged population.  During their time with the patient and family, these are the things they focused on this admission:[KH1.1]  discussion of individual needs of patient with new traumatic injuries and life style changes , discussion of goals of care: , facilitate processing of healthcare experience and aid in pain management[SP1.2]      Other Comobidities:  Stage III anal cancer s/p Chemo and radiation:  She is s/p radiation and chemotherapy which was reported to be completed in March. She should continue to follow up as previously scheduled in colo-rectal clinic for routine anoscopy and evaluation. She should continue to follow up with Dr Ray (oncology) and Dr Madison (radiation oncology) as previously scheduled for routine surveillance.      Recent catheter related wound due to perianal radiation. This was noted back in prior admission. She should continue with local wound cares to prevent further skin injury.     Hx of subacute right sided rib fractures 8-10 (dx 3/18). These were subacute/chronic. She did not express nelli pain or discomfort from this. Continued aggressive pulmonary toileting was recommended.[KH1.3]         Therapy Recommendations:   Current status of physical therapies on discharge:[KH1.1] Goal: Plan of Care/Patient Progress Review  Discharge Planner PT   Patient plan for discharge: TCU  Current status: PT: Pt seated in chair finishing breakfast after OT session, agreeale to work with PT to return to bed. Pt incont of BM, NST present to A with hygeine. Pt educated on plan, technique for standing,  progression to step to turn to bed after, demo provided and extra time to answer detailed questions about positioning etc. Pt transfers sit>stand min A x2 with light mod A when transition hands to walker. Pt able to stand mod A x5 minutes for total A hygeine, PT did need to block L knee toward end due to fatigue. Pt wanted to turn to bed, heavy mod A x2 with pt able to take a few steps but then pt unable to follow different attempts at directing pt to bend at hips to sit, pt very stiff in standing and mod to max A x2 to sit pt on bed, anxiety is a significant factor. Once seated pt needed light min to mod A to scoot to position and light mod A sit>supine into hooklying position with minimal pain reported during transfer.   Barriers to return to prior living situation: medical and functional status  Recommendations for discharge: TCU[SP1.2]    Current status of occupational therapies on discharge:[KH1.1] Goal: Plan of Care/Patient Progress Review  Discharge Planner OT   Patient plan for discharge: not stated  Current status: Pt continues to have high anxiety related to pain/activity and benefits from slow pace and reassurance. Pt able to transfer to EOB with mod A of 1-2; min A of 1 to stand and transfer to chair using FWW. Pt able to complete seated ADLs with set-up and SBA.  Barriers to return to prior living situation: weakness, pain, surgical precautions, impaired balance, anxiety  Recommendations for discharge: TCU  Rationale for recommendations: to increase pt's (I) with ADL/IADLs[SP1.2]      Significant Results and Procedures[KH1.2]   DATE:[KH1.1] 7/26/18[KH1.3]  Procedure(s): Right Hip Hemiarthroplasty - Wound Class: I-Clean[KH1.2]  Surgeon(s): Surgeon(s) and Role:     * Zaire Phan MD - Primary     * Rex Ballard MD - Resident - Assisting    Non-operative procedures[KH1.1]   1. RIGHT Fascia Iliaca Catheter--Regional anesthesia pain 7/26/18[KH1.3]    Pending Results   Code  "Status[KH1.2]   Full Code  [place] Coma Scale - Total 15/15  Constitutional: Awake, alert, cooperative, no apparent distress, laying in bed. Appear improved from day prior.  Eyes: Lids and lashes normal, pupils equal, round and reactive to light, extra ocular muscles intact, sclera clear, conjunctiva normal.  ENT: Normocephalic, atraumatic  Respiratory: BBS, breathing unlabored, no increased work of breathing, good air exchange, clear to auscultation bilaterally, no crackles or wheezing.  Cardiovascular:  regular rate and rhythm, normal S1 and S2, no S3 or S4, and no murmur noted.  GI: Normal bowel sounds, soft, non-distended, non-tender, no guarding  Genitourinary: voiding per briefs.    Skin:  Normal skin color, no redness, warmth, or swelling, no ecchymosis, no abrasions, and no jaundice.  Musculoskeletal: Right hip pain and swelling, Dressing present and intact.MILLS. Calves soft and non-tender, Distal pulses 2+   Neurologic: Awake, alert, oriented x4.  Cranial nerves II-XII are grossly intact.  Strength and sensory is intact.  No focal deficits  Neuropsychiatric: Calm, normal eye contact, alert, affect appropriate to situation.    [SP1.2]    Discharge Disposition[KH1.2]   Discharged to home[SP1.2]  Condition at discharge:[KH1.1] Stable[SP1.2]  Discharge VS:[KH1.1] Blood pressure 122/64, pulse 78, temperature 100  F (37.8  C), temperature source Oral, resp. rate 18, height 1.676 m (5' 6\"), weight 47.9 kg (105 lb 9.6 oz), SpO2 92 %, not currently breastfeeding.    Consultations This Hospital Stay   MEDICATION HISTORY IP PHARMACY CONSULT  PHYSICAL THERAPY ADULT IP CONSULT  OCCUPATIONAL THERAPY ADULT IP CONSULT  PALLIATIVE CARE ADULT IP CONSULT  OCCUPATIONAL THERAPY ADULT IP CONSULT  PHYSICAL THERAPY ADULT IP CONSULT  VASCULAR ACCESS CARE ADULT IP CONSULT  LYMPHEDEMA THERAPY IP CONSULT    Discharge Orders   No discharge procedures on file.  Discharge Medications   Current Discharge Medication List      CONTINUE these " medications which have NOT CHANGED    Details   acetaminophen (TYLENOL) 325 MG tablet Take 2 tablets (650 mg) by mouth every 4 hours as needed for pain    Associated Diagnoses: Closed fracture of one rib of right side, initial encounter      artificial saliva (BIOTENE DRY MOUTHWASH) LIQD liquid Swish and spit 5-10 mLs in mouth 4 times daily as needed for dry mouth    Associated Diagnoses: Mouth dryness      calcium-vitamin D (CALTRATE) 600-400 MG-UNIT per tablet Take 1 tablet by mouth daily  Qty: 60 tablet    Associated Diagnoses: Age-related osteoporosis with current pathological fracture, initial encounter      diphenoxylate-atropine (LOMOTIL) 2.5-0.025 MG per tablet Take 1 tablet by mouth 4 times daily as needed for diarrhea . If having less than two bowel movements daily, DO NOT GIVE  Qty: 40 tablet, Refills: 0    Associated Diagnoses: Diarrhea, unspecified type; Malignant neoplasm of anal canal (H)      Hydrocortisone (BUTT PASTE, WITH H.C,) Use topically 3-4 times daily  Qty: 1 Tube, Refills: 1    Comments: Not sure the size but can be a big tub or tube.  Associated Diagnoses: Vaginitis and vulvovaginitis      loperamide (LOPERAMIDE A-D) 2 MG tablet Take 1 tablet (2 mg) by mouth 4 times daily as needed for diarrhea . If having less than two bowel movements daily, DO NOT GIVE  Qty: 60 tablet, Refills: 3    Associated Diagnoses: Malignant neoplasm of anal canal (H)      menthol-zinc oxide (CALMOSEPTINE) 0.44-20.625 % OINT ointment Apply thick layer to irritated perianal skin 4 times per day and after bowel movements.  Qty: 1 Tube, Refills: 3    Associated Diagnoses: Malignant neoplasm of anal canal (H)      methocarbamol (ROBAXIN) 500 MG tablet Take 1 tablet (500 mg) by mouth 2 times daily as needed for muscle spasms  Qty: 120 tablet, Refills: 0    Associated Diagnoses: Closed fracture of one rib of right side, initial encounter      simethicone (MYLICON) 40 MG/0.6ML suspension Take 0.6 mLs (40 mg) by mouth  every 6 hours as needed for cramping    Associated Diagnoses: Malignant neoplasm of anal canal (H); Flatulence, eructation and gas pain      traMADol (ULTRAM) 50 MG tablet Take 1 tablet (50 mg) by mouth every 6 hours  Qty: 20 tablet, Refills: 0    Associated Diagnoses: Closed fracture of one rib of right side, initial encounter      vitamin B complex with vitamin C (VITAMIN  B COMPLEX) TABS tablet Take 1 tablet by mouth daily      ondansetron (ZOFRAN-ODT) 8 MG ODT tab Take 1 tablet (8 mg) by mouth every 8 hours as needed For nausea  Qty: 60 tablet    Associated Diagnoses: Malignant neoplasm of anal canal (H)         STOP taking these medications       multivitamin, therapeutic (THERA-VIT) TABS tablet Comments:   Reason for Stopping:             Allergies   Allergies   Allergen Reactions     Darvon [Propoxyphene]      Had reaction in teen years     Penicillins      As a child     Ketoconazole Rash     Data[KH1.2]   Most Recent 3 CBC's:[KH1.1]  Recent Labs   Lab Test  07/27/18   0759  07/26/18   0652  07/25/18   2130   WBC  7.4  5.7  5.7   HGB  11.3*  11.9  11.5*   MCV  88  87  87   PLT  329  348  351[KH1.2]      Most Recent 3 BMP's:[KH1.1]  Recent Labs   Lab Test  07/27/18   0759  07/26/18   0652  07/25/18   1815   NA  137  138  138   POTASSIUM  3.9  4.1  3.9   CHLORIDE  102  104  103   CO2  28  26  29   BUN  10  17  19   CR  0.53  0.47*  0.62   ANIONGAP  6  8  6   ELISE  8.6  8.9  9.5   GLC  91  87  83[KH1.2]     Most Recent 2 LFT's:[KH1.1]  Recent Labs   Lab Test  07/25/18   1815  06/22/18   1134   AST  18  21   ALT  19  20   ALKPHOS  126  131   BILITOTAL  0.4  0.6[KH1.2]     Most Recent INR's and Anticoagulation Dosing History:  Anticoagulation Dose History     Recent Dosing and Labs Latest Ref Rng & Units 3/30/2018 3/31/2018 4/1/2018 4/2/2018 4/3/2018 4/4/2018 7/25/2018    INR 0.86 - 1.14 1.28(H) 1.07 1.11 1.11 1.18(H) 1.13 1.05        Most Recent 3 Troponin's:[KH1.1]  Recent Labs   Lab Test  03/19/18   3334   03/17/18   1322   TROPI  0.025  0.035[KH1.2]     Most Recent 6 Bacteria Isolates From Any Culture (See EPIC Reports for Culture Details):[KH1.1]  Recent Labs   Lab Test  07/26/18   1852  04/02/18   1200  03/24/18   0944  03/24/18   0940  03/19/18 2017 03/17/18   1322   CULT  Culture negative monitoring continues  Culture negative monitoring continues  Culture negative after 14 hours  >100,000 colonies/mL  urogenital zulay  Susceptibility testing not routinely done    >100,000 colonies/mL  Candida albicans / dubliniensis  Candida albicans and Candida dubliniensis are not routinely speciated  Susceptibility testing not routinely done  *  No growth  No growth  >100,000 colonies/mL  Klebsiella pneumoniae  *  10,000 to 50,000 colonies/mL  Proteus mirabilis  *  No growth[KH1.2]     Most Recent TSH, T4 and A1c Labs:[KH1.1]  Recent Labs   Lab Test  03/17/18   1322   TSH  3.87[KH1.2]     Results for orders placed or performed during the hospital encounter of 07/25/18   XR Pelvis w Hip Right G/E 2 Views    Narrative    Exam:  XR PELVIS AND HIP RIGHT 2 VIEWS, 7/25/2018 5:52 PM    History: Trauma;     Comparison:  Pelvis MRI 5/25/2018, PET/CT 5/25/2018.    Findings:  AP and crosstable lateral views of the right hip.  Displaced, foreshortened right femoral neck fracture. Remaining  osseous structures are intact. Low lumbar predominant facet  arthropathy and mild degenerative changes in the left hip. Visualized  soft tissues are unremarkable.      Impression    Impression:    Displaced, foreshortened right femoral neck fracture.    I have personally reviewed the examination and initial interpretation  and I agree with the findings.    SHARON PAREKH MD   XR Femur Right 2 Views    Narrative    XR FEMUR RT 2 VW  7/25/2018 6:45 PM      HISTORY: trauma;     COMPARISON: Earlier today    FINDINGS: Redemonstration of right femoral neck fracture with superior  displacement of the distal fragment. No other fractures  identified.      Impression    IMPRESSION: Redemonstration of right femoral neck fracture with  superior displacement of the distal fragment.     I have personally reviewed the examination and initial interpretation  and I agree with the findings.    SHARON PAREKH MD   XR Chest 1 View    Narrative    XR CHEST 1 VW  7/25/2018 6:44 PM      HISTORY: trauma;     COMPARISON: 4/2/2018    FINDINGS: Right-sided Port-A-Cath tip projects over the upper right  atrium. Cardiac silhouette is within normal limits. No pneumothorax.  Bilateral trace pleural effusions. Subacute right-sided rib fractures.  No acute displaced fracture.      Impression    IMPRESSION:   1. Subacute right-sided rib fractures with no acute displaced fracture  identified.  2. Bilateral trace pleural effusions.    I have personally reviewed the examination and initial interpretation  and I agree with the findings.    SHARON PAREKH MD   US Lower Extremity Venous Duplex Bilateral    Narrative    EXAMINATION: DOPPLER VENOUS ULTRASOUND OF BILATERAL LOWER EXTREMITIES,  7/25/2018 10:03 PM     COMPARISON: None.    HISTORY: Assess for DVT with prolonged immobilization, swelling    TECHNIQUE:  Gray-scale evaluation with compression, spectral flow and  color Doppler assessment of the deep venous system of both legs from  groin to knee, and then at the ankles.    FINDINGS:  In both lower extremities, the common femoral, femoral, popliteal and  posterior tibial veins demonstrate normal compressibility and blood  flow.        Impression    IMPRESSION:   1.  No evidence of deep venous thrombosis in either lower extremity.    I have personally reviewed the examination and initial interpretation  and I agree with the findings.    MENG PARTIDA MD   XR Pelvis Port 1/2 Views    Narrative    This exam was marked as non-reportable because it will not be read by a   radiologist or a [place] non-radiologist provider.             XR Pelvis w Hip Right G/E 2  Views    Narrative    History: Postop and PACU.    COMPARISON: Intraoperative films performed today at 1900 hours.    FINDINGS: Bipolar right hip arthroplasty has been placed with cemented  femoral component. Soft tissue drain about the right thigh along with  prominent soft tissue gas compatible with immediate postoperative  status. Bones appear osteopenic. Rotary curvature of the visualized  lower lumbar spine with prominent facet osteoarthrosis on the right.  The left hip is unremarkable.      Impression    IMPRESSION: Satisfactory postoperative appearance of new right hip  hemiarthroplasty.    PABLO GAMEZ MD   XR Ankle Port Right 2 Views    Narrative    3 views right ankle radiographs 7/27/2018 1:03 PM    History: right ankle swelling. S/p  fall;     Additional History from EMR: Patient fell;  right femoral neck  fracture. Concern for ankle fracture    Comparison: None    Findings: 2 radiographs, nonstandard projections were obtained.    Patient is severely osteopenic. No obviously displaced fracture  however study is limited by the nonstandard projections and osteopenia  which could severely limits the diagnostic accuracy.     Ankle mortise and syndesmosis are congruent on this non-weight bearing  images.    Soft tissue is unremarkable.      Impression    Impression:  1. Severe osteopenia  2. Study is limited by osteopenia and nonstandard projections which  severely limits the diagnostic accuracy, there is a high clinical suspicion for fracture consider CT or repeat radiographs.[KH1.1]        Time Spent on this Encounter[KH1.2]   I, Hugo Farrell, personally saw the patient today and spent greater than 30 minutes discharging this patient.[SP1.2]    We appreciate the opportunity to care for your patient while in the hospital.  Should you have any questions about their injuries or this discharge summary our contact information is below.    Trauma Services  [facility]   Department of  Critical Care and Acute Care Surgery  [address]  [place] [address]  Office: [phone][KH1.1]         Revision History        User Key Date/Time User Provider Type Action    > SP1.1 7/30/2018  1:19 PM Hugo Farrell NP Nurse Practitioner Sign     SP1.2 7/30/2018  1:15 PM Hugo Farrell NP Nurse Practitioner      KH1.2 7/27/2018  2:35 PM Guzman Uriostegui PA-C Physician Assistant Pend     KH1.3 7/27/2018  2:15 PM Guzman Uriostegui PA-C Physician Assistant      KH1.1 7/27/2018  2:14 PM Guzman Uriostegui PA-C Physician Assistant                      Consult Notes      Consults by Tracey Lassiter APRN CNP at 7/27/2018 10:10 AM     Author:  Tracey Lassiter APRN CNP Service:  Palliative Author Type:  Nurse Practitioner    Filed:  7/27/2018  3:09 PM Date of Service:  7/27/2018 10:10 AM Creation Time:  7/27/2018 10:09 AM    Status:  Signed :  Tracey Lassiter APRN CNP (Nurse Practitioner)     Consult Orders:    1. Palliative Care Adult IP Consult: Patient to be seen: Routine within 24 hrs; Call back #: job code [id number]; Shirin, 77 yo female stage II cancer now s/p fall with femoral neck  frx.; Consultant may enter orders: Yes [[id number]] ordered by Guzman Uriostegui PA-C at 07/26/18 0829                [facility]  Palliative Care Consultation Note    Patient: Elizabeth Taylor  Date of Admission:  7/25/2018    Requesting provider/team: Guzman Uriostegui PA-C with Trauma  Reason for consult: Routine Geriatric Trauma    Recommendations:  -[JS1.1]Hydromorphone 0.2 mg IV one time dose ordered for uncontrolled pain   -Encouraged use of Robaxin for muscle spasms in left thigh (chronic issue she uses muscle relaxants for at home)   -Palliative care will follow up on Monday to explore goals of care[JS1.2]      These recommendations have been discussed with[JS1.1] Guzman Uriostegui PA-C[JS1.2].    Thank you for the opportunity to participate in the care of  "this patient and family. Our team will follow. Please feel free to contact the on-call Palliative provider with any urgent needs.    GRAHAM Srivastava CNP  Palliative Care Consult Team  Pager: [phone]    [facility] Inpatient Team Consult pager [phone] (M-F 8-4:30)  After-hours Answering Service [phone]   Palliative Clinic: [phone][JS1.1]     75[JS1.2] minutes spent with patient, with >50% counseling and in care coordination.    Assessment:  Elizabeth Taylor is a 78 year old female with[JS1.1] history of anal cancer s/p chemoradiation admitted with a right femoral neck fracture following a mechanical fall.[SK1.1]     I met with Elizabeth today to introduce the Palliative Care team and services we provide; such as symptom management, assistance navigating chronic illness and goals for treatment, counseling, psychosocial and spiritual support. 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 [employer][JS1.2]     Advance Care Planning:               Decision making capacity:[JS1.1] intact[JS1.2]        Goals of Care:[JS1.1] deferred due to uncontrolled pain and emotional distress[JS1.2]        Health care directive:[JS1.1] not on file[JS1.2]       Health care agent:[JS1.1] next of kin[JS1.2]       Code Status:[JS1.1]  Full Code[JS1.2]       POLS[JS1.1]T no[SK1.1]t[JS1.2] on file[SK1.1]    History of Present Illness[JS1.3]   Sources of History:[JS1.1] patient and electronic health record    History of anal cancer, s/p chemoradiation, admitted for management of R femoral neck fracture following a mechanical fall. The fall was about two weeks ago and she continued to bear weight and move around with the help of family, " until the pain worsened a couple of days ago. She presented to the ED on 7/25 with pain. History of falls with previous rib fractures and hemopneumothorax. Underwent right hip hemiarthroplasty on 7/26 with orthopedics.     Elizabeth is known to palliative[SK1.1] SW[JS1.4] from her admission in March for a fall at home (resulted in the rib fractures noted above).[SK1.1]     ROS:  A comprehensive ROS has been negative other than stated in the HPI and below:   Palliative Symptom Review (0=no symptom/no concern, 1=mild, 2=moderate, 3=severe):  Pain:[JS1.1] 3 -- right hip is the worst, and left thigh spasms are a chronic issue[JS1.2]  Fatigue:[JS1.1] 3[JS1.2]  Nausea:[JS1.1] 0[JS1.2]  Anxiety:[JS1.1] 2[JS1.2]  Drowsiness:[JS1.1] 0[JS1.2]  Poor Appetite:[JS1.1] 0[JS1.2]       Past Medical History:[JS1.1]   Past Medical History:   Diagnosis Date     Anal cancer (H)      Endometriosis, site unspecified      Thyroiditis, unspecified     Thryoiditis-resolved[JS1.3]          Past Surgical History:[JS1.1]   Past Surgical History:   Procedure Laterality Date     APPENDECTOMY      as a child     COLONOSCOPY N/A 4/13/2017    Procedure: COLONOSCOPY;  Surgeon: Juan Dos Santos MD;  Location:  GI     INSERT PORT VASCULAR ACCESS Right 2/8/2018    Procedure: INSERT PORT VASCULAR ACCESS;  Place Single Lumen Venous Chest Port Placement;  Surgeon: Zaire Moreira PA-C;  Location:  OR     SURGICAL HISTORY OF -       endometriosis[JS1.3]             Family History:[JS1.1]   Family History   Problem Relation Age of Onset     Cancer Sister      Cancer Maternal Grandmother      lymphoma     HEART DISEASE Father      MI     Uterine Cancer Niece[JS1.3]            Allergies:[JS1.1]   Allergies   Allergen Reactions     Darvon [Propoxyphene]      Had reaction in teen years     Penicillins      As a child     Ketoconazole Rash[JS1.3]            Medications:   I have reviewed this patient's medication profile and medications given  in the past 24 hours.           Physical Exam:   Vital Signs:[JS1.1] Temp: 96.8  F (36  C) Temp src: Oral BP: 122/64   Heart Rate: 98 Resp: 18 SpO2: 92 % O2 Device: None (Room air) Oxygen Delivery: 5 LPM[JS1.3]  Weight:[JS1.1] 105 lbs 9.6 oz[JS1.3]    Constitutional:[JS1.1] Elderly fe[JS1.2]male, seen lying in hospital bed[JS1.1],[JS1.2] in no acute distress.  Head: Normocephalic. Atraumatic. MMM.   Extremities: Warm and well perfused.[JS1.1] 2+ BLE[JS1.2] edema.  Pulm: Non-labored breathing. Speaking in complete sentences.    Musculoskeletal:[JS1.1] Hip abductor in place.[JS1.2]   Skin:[JS1.1] Right hip surgical dressing C,D,I. No rashes.[JS1.2]   Neuro: A/O x 4. Face symmetrical. PERRL. EOMI.   Psych: Speech clear. Memory and insight intact.       Data reviewed:     CMP[JS1.1]  Recent Labs  Lab 07/27/18 0759 07/26/18  0652 07/25/18  1815    138 138   POTASSIUM 3.9 4.1 3.9   CHLORIDE 102 104 103   CO2 28 26 29   ANIONGAP 6 8 6   GLC 91 87 83   BUN 10 17 19   CR 0.53 0.47* 0.62   GFRESTIMATED >90 >90 >90   GFRESTBLACK >90 >90 >90   ELISE 8.6 8.9 9.5   MAG 1.8 2.0  --    PHOS 2.9 3.1  --    PROTTOTAL  --   --  7.4   ALBUMIN  --   --  3.2*   BILITOTAL  --   --  0.4   ALKPHOS  --   --  126   AST  --   --  18   ALT  --   --  19[JS1.3]     CBC[JS1.1]  Recent Labs  Lab 07/27/18 0759 07/26/18  0652 07/25/18  2130 07/25/18  1815   WBC 7.4 5.7 5.7 6.3   RBC 3.98 4.23 4.11 4.24   HGB 11.3* 11.9 11.5* 12.0   HCT 35.0 36.8 35.9 37.2   MCV 88 87 87 88   MCH 28.4 28.1 28.0 28.3   MCHC 32.3 32.3 32.0 32.3   RDW 13.8 14.0 14.0 14.0    348 351 371[JS1.3]     INR[JS1.1]  Recent Labs  Lab 07/25/18  1815   INR 1.05[JS1.3]              Revision History        User Key Date/Time User Provider Type Action    > JS1.4 7/27/2018  3:09 PM Tracey Lassiter APRN CNP Nurse Practitioner Sign     JS1.2 7/27/2018  3:06 PM Tracey Lassiter APRN CNP Nurse Practitioner Share     SK1.1 7/27/2018 11:28 AM Crissy Enciso Medical Student  "Share     JS1.3 7/27/2018 10:12 AM Tracey Lassiter APRN CNP Nurse Practitioner Share     JS1.1 7/27/2018 10:09 AM Tracey Lassiter APRN CNP Nurse Practitioner             Consults by Zaire Phan MD at 7/25/2018  7:40 PM     Author:  Zaire Phan MD Service:  Orthopedics Author Type:  Physician    Filed:  7/26/2018  8:27 PM Date of Service:  7/25/2018  7:40 PM Creation Time:  7/25/2018  7:40 PM    Status:  Addendum :  Zaire Phan MD (Physician)         [facility]  ORTHOPAEDIC SURGERY HISTORY AND PHYSICAL    CC: Right hip pain.    DATE OF INJURY: Approximately 2 weeks ago.    HISTORY OF PRESENT ILLNESS:   The orthopaedic surgery service was consulted by Dr. Gutierrez, for evaluation and treatment recommendations of right femoral neck fracture..    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  The patient sustained a ground-level mechanical fall approximately 2 weeks ago while ambulating to her living room.  She landed on her right hip.  She had immediate onset of right groin pain, difficulty ambulating, but was able to get up to bed.  She had assistance of family, and has been operating under the assumption that she had a \"groin pull\" over the past 2 weeks.  She has had difficulty and significant pain with ambulation and per her sister's report, has been hobbling around the house and has been mainly bedridden.  Her family finally convinced her to come to the emergency department today.    Patient denies hitting her head or loss of consciousness.  She denies any pain in her other joints other than her right hip.  She denies any pre-existing right hip pain.  She denies any numbness or tingling in her bilateral lower extremities.     Of note, the patient recently finished treatment for her anal carcinoma.  " She has been slowly recovering from this.  She notes significant generalized weakness, but has been getting stronger prior to her fall.  She uses a walker for ambulation.    PAST MEDICAL HISTORY:   Past Medical History:   Diagnosis Date     Anal cancer (H)      Endometriosis, site unspecified      Thyroiditis, unspecified     Thryoiditis-resolved     Patient denies any personal history of bleeding disorders, clotting disorders, or adverse reactions to anesthesia.    PAST SURGICAL HISTORY:    Past Surgical History:   Procedure Laterality Date     APPENDECTOMY      as a child     COLONOSCOPY N/A 4/13/2017    Procedure: COLONOSCOPY;  Surgeon: Juan Dos Santos MD;  Location: [facility]     INSERT PORT VASCULAR ACCESS Right 2/8/2018    Procedure: INSERT PORT VASCULAR ACCESS;  Place Single Lumen Venous Chest Port Placement;  Surgeon: Zaire Moreira PA-C;  Location:  OR     SURGICAL HISTORY OF -       endometriosis       MEDICATIONS:   Prior to Admission medications    Medication Sig Last Dose Taking? Auth Provider   acetaminophen (TYLENOL) 325 MG tablet Take 2 tablets (650 mg) by mouth every 4 hours as needed for pain   Brenda Gurrola PA-C   artificial saliva (BIOTENE DRY MOUTHWASH) LIQD liquid Swish and spit 5-10 mLs in mouth 4 times daily as needed for dry mouth  Patient not taking: Reported on 6/22/2018   Brenda Gurrola PA-C   calcium-vitamin D (CALTRATE) 600-400 MG-UNIT per tablet Take 1 tablet by mouth daily  Patient not taking: Reported on 6/22/2018   Brenda Gurrola PA-C   diphenoxylate-atropine (LOMOTIL) 2.5-0.025 MG per tablet Take 1 tablet by mouth 4 times daily as needed for diarrhea . If having less than two bowel movements daily, DO NOT GIVE  Patient not taking: Reported on 6/22/2018   Brenda Gurrola PA-C   Hydrocortisone (BUTT PASTE, WITH H.C,) Use topically 3-4 times daily   Baron Seth MD   loperamide (LOPERAMIDE A-D) 2 MG tablet Take 1 tablet (2 mg) by mouth 4 times  daily as needed for diarrhea . If having less than two bowel movements daily, DO NOT GIVE  Patient not taking: Reported on 6/22/2018   Brenda Gurrola PA-C   menthol-zinc oxide (CALMOSEPTINE) 0.44-20.625 % OINT ointment Apply thick layer to irritated perianal skin 4 times per day and after bowel movements.   Zaire Madison MD   methocarbamol (ROBAXIN) 500 MG tablet Take 1 tablet (500 mg) by mouth 2 times daily as needed for muscle spasms   Shen Ray MD   multivitamin, therapeutic (THERA-VIT) TABS tablet Take 1 tablet by mouth daily   Brenda Gurrola PA-C   ondansetron (ZOFRAN-ODT) 8 MG ODT tab Take 1 tablet (8 mg) by mouth every 8 hours as needed For nausea  Patient not taking: Reported on 6/22/2018   Brenda Gurrola PA-C   simethicone (MYLICON) 40 MG/0.6ML suspension Take 0.6 mLs (40 mg) by mouth every 6 hours as needed for cramping  Patient not taking: Reported on 6/22/2018   Brenda Gurrola PA-C   traMADol (ULTRAM) 50 MG tablet Take 1 tablet (50 mg) by mouth every 6 hours   Brenda Gurrola PA-C   vitamin B complex with vitamin C (VITAMIN  B COMPLEX) TABS tablet Take 1 tablet by mouth daily   Reported, Patient       ALLERGIES:   Darvon [propoxyphene]; Penicillins; and Ketoconazole    SOCIAL HISTORY:   Patient currently lives with her sister and her sister's .  She has been slowly recuperating from her cancer treatment.  She notes increasing activity prior to her fall, the ability to walk across the [place] and make 7 laps around the ballroom at the assisted living.  She uses a walker for ambulation, but did not need this prior to her cancer treatment.  She denies smoking, alcohol use, or drug use.    FAMILY HISTORY:  Family History   Problem Relation Age of Onset     Cancer Sister      Cancer Maternal Grandmother      lymphoma     HEART DISEASE Father      MI     Uterine Cancer Niece          REVIEW OF SYSTEMS:   Positive for above in the HPI.. Otherwise a 10-point reviews of  systems was negative except as noted above in the HPI.     PHYSICAL EXAM:   Vitals:    07/25/18 1634 07/25/18 1810   BP: 133/84    Resp: 16    Temp: 98.1  F (36.7  C)    TempSrc: Oral    SpO2: 97% 100%     General: Awake, alert, appropriate, following commands, NAD.  Neuro: CN II-XII grossly intact.   Skin: No obvious rashes, see extremity exam below for details.  HEENT: Normal.   Lungs: Breathing comfortably and nonlabored, no wheezes or stridor noted.  Heart/Cardiovascular: Regular pulse, no peripheral cyanosis.  Neck: Nontender to palpation, full range of motion without pain.  Pelvis: Stable to AP and Lateral compression, non-tender.  Right Upper Extremity: No deformity, skin intact. No significant tenderness to palpation over clavicle, AC joint, shoulder, arm, elbow, forearm, wrist. Normal ROM shoulder, elbow, wrist without pain. Motor intact distally with finger flexion/extension/intrinsics/EPL, OK sign 5/5 strength. SILT ax/m/r/u nerve distributions. Radial pulse palpable, 2+.   Left Upper Extremity: No deformity, skin intact. No significant tenderness to palpation over clavicle, AC joint, shoulder, arm, elbow, forearm, wrist. Normal ROM shoulder, elbow, wrist without pain. Motor intact distally with finger flexion/extension/intrinsics/EPL, OK sign 5/5 strength. SILT ax/m/r/u nerve distributions. Radial pulse palpable, 2+.   Right Lower Extremity: Shortened and externally rotated.  Hip range of motion not attempted due to known fracture.  No tenderness to palpation of the right knee, leg, ankle, or foot.  There is mild edema of the right foot.  No pain with range of motion of the right foot or ankle.  Motor intact distally TA/GSC/EHL/FHL with 5/5 strength. SILT sp/dp/tibial/saph/sural nerves. DP/PT pulses palpable, 2+, toes warm and well perfused.   Left Lower Extremity: No deformity, skin intact. No significant tenderness to palpation over thigh, knee, leg, ankle/foot. No pain with ROM hip/knee/ankle. Motor  intact distally TA/GSC/EHL/FHL with 5/5 strength. SILT sp/dp/tibial/saph/sural nerves. DP/PT pulses palpable, 2+, toes warm and well perfused.     LABS:  Hemoglobin   Date Value Ref Range Status   2018 10.8 (L) 11.7 - 15.7 g/dL Final   2018 10.2 (L) 11.7 - 15.7 g/dL Final     WBC   Date Value Ref Range Status   2018 6.1 4.0 - 11.0 10e9/L Final     Platelet Count   Date Value Ref Range Status   2018 255 150 - 450 10e9/L Final     INR   Date Value Ref Range Status   2018 1.05 0.86 - 1.14 Final     Creatinine   Date Value Ref Range Status   2018 0.62 0.52 - 1.04 mg/dL Final     Glucose   Date Value Ref Range Status   2018 83 70 - 99 mg/dL Final       IMAGING:  AP the pelvis with lateral of the right hip shows a displaced right subcapital femoral neck fracture.    AP and lateral of the right femur reveals no further fracture dislocation.    IMPRESSION:   Elizabeth Taylor is a 78 year old female with a past medical history significant for anal cancer status post recent chemotherapy and radiation therapy status-post mechanical fall approximately 2 weeks ago with the followin.  Displaced right subcapital femoral neck fracture    RECOMMENDATIONS:   - Admit to trauma.  - Plan for OR: Tentative plan for tomorrow 2018 for right hip hemiarthroplasty.   -Consent: Will obtain with final operative plan obtained.   -Pre-op labs: Ordered in ED.   -Medicine clearance: Pending, appreciate, cares  - Anticoagulation/DVT Prophylaxis: Okay for dose of Lovenox tonight, hold in anticipation of operating room tomorrow.  - Antibiotics/Tetanus: Perioperative ordered.  - X-rays/Imaging: Recommend DVT ultrasound bilateral lower extremities given 2 weeks of immobilization.  - Activity: Bedrest  - Weight bearing: Nonweightbearing right lower extremity  - Pain control: Per primary team, okay for local block per anesthesia  - Diet: Regular diet appropriate for tonight, n.p.o. at midnight for  potential OR tomorrow.  - Follow-up: To be determined  - Disposition: Inpatient    Assessment and Plan discussed with Dr. Murillo[SR1.1] and Dr. Phan[CM1.1], Orthopaedic Surgery staff.     Lennox Mcnally MD 07/25/18  Orthopaedic Surgery Resident, PGY-4[SR1.1]    Zaire RYDER, personally saw and evaluated this patient on July 26, 2018.  I agree with the resident's above documented note in physical exam.  She is a multiply comorbid female status post recent chemotherapy and radiation for anal cancer, with a subacute displaced right femoral neck fracture.  I have indicated her for hemiarthroplasty.  We discussed the risks and benefits of the surgery including risk of infection, risk of dislocation, risk of leg length discrepancy, risk of sciatic nerve injury, risk of periprosthetic fracture or need for conversion to a hip arthroplasty in the future due to groin or hip pain.  The main benefit of surgery is that I think it will help her walk again.  She understands these risks and benefits and she would like to proceed.  We will proceed to the operating room tonight July 26, 2018.[CM1.1]    For questions about this patient, please contact me at my pager.[SR1.1]           Revision History        User Key Date/Time User Provider Type Action    > CM1.1 7/26/2018  8:27 PM Zaire Phan MD Physician Addend     SR1.1 7/25/2018  8:12 PM Lennox Mcnally MD Resident Sign                     Progress Notes - Physician (Notes from 07/27/18 through 07/30/18)      Progress Notes by Tamika Martinez MSW at 7/30/2018  2:04 PM     Author:  Tamika Martinez MSW Service:  Social Work Author Type:      Filed:  7/30/2018  2:48 PM Date of Service:  7/30/2018  2:04 PM Creation Time:  7/30/2018  2:04 PM    Status:  Addendum :  Tamika Martinez MSW ()         Social Work Services Discharge Note      Patient Name:  Elizabeth Taylor     Anticipated Discharge Date:  07/30/18 @ 1515    Discharge Disposition:   Facility: [facility]  PH: [phone]  F: [phone]  Nurse to Nurse Call: PH: [phone]    Following MD: Baron Seth  [address] [facility] [address]     Pre-Admission Screening (PAS) online form has been completed.  The Level of Care (LOC) is:  Determined  Confirmation Code is:  [id number]  Patient/caregiver informed of referral to [facility] for Pre-Admission Screening for skilled nursing facility (SNF) placement and to expect a phone call post discharge from SNF.     Additional Services/Equipment Arranged:    - Transportation: [employer] (PH: [phone]) stretcher scheduled for 1515.  Indication for stretcher transport is right femur fracture, hip injury and bed bound status with weight bearing precautions sitting and standing.  Pt and family are aware and in agreement that insurance may not cover stretcher transport: YES  PCS form completed prior to transport.     Patient / Family response to discharge plan:  Pt and family in agreement with the plan.     Persons notified of above discharge plan:  Pt, Family (Called Sister Sahil[LH1.1] and Friend Katherine[LH1.2]), bedside RN, Trauma team, PCP, SNF admissions.    Staff Discharge Instructions:  Please fax discharge orders and signed hard scripts for any controlled substances.  Please print a packet and send with patient.     CTS Handoff completed:  YES    Medicare Notice of Rights provided to the patient/family:  YES    GOMEZ Becerra APSW  [address]   Phone: [phone]  Pager: [phone]  Unit: [phone][LH1.1]             Revision History        User Key Date/Time User Provider Type Action    > LH1.2 7/30/2018  2:48 PM Tamika Martinez MSW  Addend     LH1.1 7/30/2018  2:05 PM Tamika Martinez MSW  Sign            Progress Notes by Jessica Tyler MSW at 7/30/2018  1:43 PM     Author:  Jessica Tyler MSW Service:  Palliative  "Author Type:      Filed:  7/30/2018  2:30 PM Date of Service:  7/30/2018  1:43 PM Creation Time:  7/30/2018  1:43 PM    Status:  Signed :  Jessica Tyler MSW ()         Palliative Care Inpatient Clinical Social Work Assessment    Patient Information:[MF1.1]  Elizabeth is a 78 year old woman with a history of anal cancer status post chemoradiation. She is admitted due to a femoral neck fracture after a fall. She has a history of multiple falls. She underwent right hip hemiarthroplasty on 7/26. Palliative care team is following due to routine geriatric trauma consult. I am following to assess coping.     Visit Summary: I met with Elizabeth this afternoon for an initial visit. She shared her medical narrative with me, including going through chemoradiation and her multiple falls. She shared about the pain she has been having in her right foot. She requested a massage therapist for foot pain.  Plan was to place a palliative care massage referral but it looks like she will be discharging this afternoon and our massage therapist is here T/TH.[MF1.2]     Relevant Symptoms/Concerns     Physical:[MF1.1]  Right foot pain around the arch and ankle. She thinks movement helps but forgets to move around when she is in bed. She is wondering if there is a pain medication \"that can target my foot\" as she feels the medications she is currently taking are not helpful with her foot pain.[MF1.2]    Psychological/Emotional/Existential:[MF1.1]  As Elizabeth shared about her falls, she shared that this last fall she was surprised to find out that her bone had broken. 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 I attempted to reflect on this with her but she did not readily engage in processing. It seems that this last fall was emotionally challenging for her as she was by herself and trying to get around to contact her friend, Diya.[MF1.2]    Family/Social/Caregiver:      Developmental:  " "   Mental Health:     End of Life:     Cultural/Catholic/Spiritual:     Grief/Loss:     Concurrent Stressors:       Comments:      Strengths     Physical:    Psychological/Emotional/Existential:     Family/Social/Caregiver:[MF1.1]  She tells me that she feels supported by her sister (Sahil) who she says is making all the arrangements for returning back to [place]. 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 When I said that I heard she was a ballerina she expressed frustration with \"that rumor being started\". She explains that the title \"ballerina\" is the highest one can receive and that she is not technically a ballerina. However, she thoroughly enjoys teaching ballet.[MF1.2]    Mental Health:     End of Life:     Cultural/Catholic/Spiritual:     Grief/Loss:        Comments:      Goals/Decision Making/Advance Care Planning   Preferences:[MF1.1]  Currently full code[MF1.2]   Concerns:     Documents:[MF1.1]  No HCD in EMR[MF1.2]   Decision Making Issues:       Comments:[MF1.1]  Elizabeth tells me that she is hoping and planning to return to teaching [employer].[MF1.2]     Resource Needs     Discharge Planning:  Per Unit/Program  and/or Care Coordinator   Other:     Comments:      Sources of Information   Patient:[MF1.1]  X[MF1.2]   Family:     Staff:[MF1.1]  X[MF1.2]   Chart Review:[MF1.1]  X[MF1.2]   Other:      Intervention (Check all that apply)[MF1.1]    X[MF1.2]   Assessment of palliative specific issues         Introduction of Palliative clinical social work interventions       Adjustment to illness counseling       Advanced care planning       Attended/participated in care conference       Behavioral interventions for symptom management[MF1.1]    X[MF1.2]   Facilitation of processing of thoughts/feelings       Family communication facilitated       Grief counseling     "   Goals of care discussion/facilitation       Life legacy work[MF1.1]    X[MF1.2]  Life review facilitation       Psychoeducation       Re-framing       Resource referral       Other:       Comments:[MF1.1]  Elizabeth was receptive and appreciative of my visit. She is looking forward to returning to [facility].[MF1.2]     Plan:[MF1.1]  Plan is for her to return to [facility] this afternoon. No further follow up planned this admission.[MF1.2]        JEFF Rollins, [facility] Palliative Care    Pager: [phone][MF1.1]                          Revision History        User Key Date/Time User Provider Type Action    > MF1.2 7/30/2018  2:30 PM Jessica Tyler MSW  Sign     MF1.1 7/30/2018  1:43 PM Jessica Tyler MSW              Progress Notes by Kira Yeboah APRN CNP at 7/30/2018  8:36 AM     Author:  Kira Yeboah APRN CNP Service:  Anesthesiology Author Type:  Nurse Practitioner    Filed:  7/30/2018  8:45 AM Date of Service:  7/30/2018  8:36 AM Creation Time:  7/30/2018  8:36 AM    Status:  Attested :  Kira Yeboah APRN CNP (Nurse Practitioner)    Cosigner:  Anthony Whatley MD at 7/30/2018 10:44 AM        Attestation signed by Anthony Whatley MD at 7/30/2018 10:44 AM        Physician Attestation   I agree with the information in this note.    Anthony Whatley MD                               REGIONAL ANESTHESIA PAIN SERVICE FOLLOW UP NOTE  SUBJECTIVE  Interval History: Overnight no acute events.  Patient reports pain improved, adequate relief with current analgesics.  She is very pleased she has been comfortable since nerve block infusion stopped/clamped yesterday afternoon.  Denies weakness, paresthesias, circumoral numbness, metallic taste or tinnitus.  Pt is tolerating diet, voiding, ambulating with assistance, denies nausea.  Patient is currently tolerating  diet     Clinically Aligned Pain Assessment (CAPA):  Comfort (How is your pain?):  Comfortably manageable  Change in Pain (Since your last medication/intervention?): Getting better  Pain Control (How are your pain treatments working?):  Fully effective pain control  Functioning (Are you able to do activities to get better?) : Can do most things, but pain gets in the way of some     Antithrombotic/Thrombolytic Therapy:  enoxaparin (LOVENOX) injection 30 mg 30 mg, SC, Q24H Given: 07/29 1006       Medications related to Pain Management (Future)    Start     Dose/Rate Route Frequency Ordered Stop    07/29/18 0000  diclofenac (FLECTOR) 1.3 % Patch      1 patch Transdermal 2 TIMES DAILY 07/29/18 1628      07/29/18 0000  acetaminophen (TYLENOL) 500 MG tablet      1,000 mg Oral EVERY 8 HOURS 07/29/18 1628 08/03/18 2359    07/27/18 0930  diclofenac (FLECTOR) 1.3 % Patch 1 patch      1 patch Transdermal 2 TIMES DAILY 07/27/18 0912      07/27/18 0915  diclofenac (FLECTOR) Patch in Place       Transdermal EVERY 8 HOURS 07/27/18 0912      07/26/18 2159  lidocaine 1 % 1 mL      1 mL Other EVERY 1 HOUR PRN 07/26/18 2159 07/26/18 2159  lidocaine (LMX4) cream       Topical EVERY 1 HOUR PRN 07/26/18 2159 07/26/18 0000  acetaminophen (TYLENOL) tablet 1,000 mg      1,000 mg Oral EVERY 8 HOURS 07/25/18 2309 07/25/18 2309  methocarbamol (ROBAXIN) tablet 500 mg      500 mg Oral 2 TIMES DAILY PRN 07/25/18 2309 07/25/18 2309  oxyCODONE IR (ROXICODONE) half-tab 2.5-5 mg      2.5-5 mg Oral EVERY 3 HOURS PRN 07/25/18 2309 07/25/18 2309  polyethylene glycol (MIRALAX/GLYCOLAX) Packet 17 g      17 g Oral DAILY PRN 07/25/18 2309 07/25/18 2309  HYDROmorphone (DILAUDID) injection 0.2 mg      0.2 mg Intravenous EVERY 2 HOURS PRN 07/25/18 2309 07/25/18 2309  bisacodyl (DULCOLAX) Suppository 10 mg      10 mg Rectal DAILY PRN 07/25/18 3342              OBJECTIVE  CBC RESULTS:   Recent Labs   Lab Test  07/29/18   0650  07/28/18   0716   WBC   --   8.5   RBC   --   3.86   HGB  9.8*  10.8*   HCT   --    "34.3*   MCV   --   89   MCH   --   28.0   MCHC   --   31.5   RDW   --   14.3   PLT  257  283     Lab Results   Component Value Date    INR 1.05 07/25/2018    INR 1.13 04/04/2018    INR 1.18 04/03/2018    INR 1.11 04/02/2018       Vitals:[CP1.1] Blood pressure 114/59, pulse 88, temperature 97.1  F (36.2  C), temperature source Oral, resp. rate 16, height 1.676 m (5' 6\"), weight 51.9 kg (114 lb 8 oz), SpO2 96 %, not currently breastfeeding.[CP1.2]    Exam:  Constitutional: healthy, alert and no distress  Neuro/MSK:  Strength BLE 5/5  and overall symmetric  Skin: fascia iliaca site c/d/i, no tenderness, erythema, heme, edema.      ASSESSMENT/PLAN  Elizabeth Taylor is a 78 year old female POD #4 s/p ARTHROPLASTY HIP and placement of right fascia iliaca catheter for analgesia. Patient currently achieving adequate pain control with multimodal analgesia, nerve block stopped since yesterday afternoon.  Is meeting activity goals. No weakness or paresthesias. No evidence of adverse side effects associated with local anesthetic.     right fascia iliaca nerve block catheter removed today July 30, 2018 at 0830 without complication, dark tip intact.  Bandaid applied  - okay to continue enoxaparin Q 24 hrs as ordered - bedside RN aware  - will sign off  - please call [facility] with questions or concerns  - discussed plan with attending anesthesiologist    GRAHAM Marcelo CNP   Regional Anesthesia Pain Service  0830    [facility] Contact Info (for in-house use only):  Job code ID: [id number]   [facility] 0599  [facility] 0602  [facility] phone: dial [phone], enter jobcode ID, then enter call-back number.    Text: Use [employer] on the Intranet <Paging/Directory> tab and enter Jobcode ID.   If no call back at any time, contact the hospital  and ask for [facility] attending or backup [CP1.1]       Revision History        User Key Date/Time User Provider Type Action    > CP1.2 7/30/2018  8:45 AM Kira Yeboah APRN CNP Nurse " Practitioner Sign     CP1.1 7/30/2018  8:36 AM Kira Yeboah APRN CNP Nurse Practitioner             Progress Notes by Boris Cheng MD at 7/30/2018  7:56 AM     Author:  Boris Cheng MD Service:  Orthopedics Author Type:  Resident    Filed:  7/30/2018  8:07 AM Date of Service:  7/30/2018  7:56 AM Creation Time:  7/30/2018  7:56 AM    Status:  Signed :  Boris Cheng MD (Resident)         Orthopaedic Surgery Progress Note:       Subjective:   NAEO. Pain is controlled on oral meds. Tolerating oral diet. Voiding+/bm+. Denies fever/chills/SOB/chest pain/nause/vomiting/new numbness/tinging.    Ambulated once yesterday, wants to ambulate more.     Objective:[GS1.1]   Temp:  [97  F (36.1  C)-98.8  F (37.1  C)] 97.1  F (36.2  C)  Pulse:  [86-97] 88  Resp:  [16] 16  BP: (110-125)/(59-73) 114/59  SpO2:  [95 %-98 %] 96 %[GS1.2]    Intake/Output Summary (Last 24 hours) at 07/27/18 0753  Last data filed at 07/27/18 0720   Gross per 24 hour   Intake             1684 ml   Output              780 ml   Net              904 ml       Gen: NAD. Resting comfortably in bed  Resp: Breathing comfortably on room air  RLE:  aquacel is c/d/i, 1 minimal scant drainage (2-4mm). Abduction pillow in place.  SILT in femoral, saphenous, sural, deep peroneal, superficial peroneal, and tibial n dist.   Fires EHL/FHL/TA/Gastroc with 4/5 strength      Foot skin intact, not swollen, nonerythematous, nontender to palpation at posterior/lateral/medial malleolus, weakly able to dorsiflex/plantar flexion intact, tender to palpation proximal anterior midfoot, is wwp    Labs:[GS1.1]    Recent Labs  Lab 07/29/18  0650 07/28/18  0716 07/27/18  0759 07/26/18  0652   WBC  --  8.5 7.4 5.7   HGB 9.8* 10.8* 11.3* 11.9    [phone][GS1.2]     Cultures  7/26 OR cultures - NGTD    IMAGING  7/29/2018 R foot x-rays:IMPRESSION: No definite evidence of acute osseous abnormalities in the limited examination due to patient's marked  osteopenia.       Assessment & Plan:   Post-Op Plan:  Assessment/Plan: Elizabeth Taylor is a 78 year old female with right femoral neck fracture now s/p right hip hemiarthroplasty on 7/26 with Dr. Phan.    7/27 - C. Diff positive.  7/29 - tender to palpation at R anterior foot, x-rays negative, cam boot for comfort order placed.     Trauma Primary  Activity: Up with assist and assistive devices as needed until independent. Posterior hip precautions x 3 months.   Weight bearing status: WBAT  ABD Pillow at all times when at rest and in bed for 6 weeks  Antibiotics: Clinda x 24 hours. -- C diff positive; abx per primary team  Diet: adat. Bowel regimen. Anti-emetics PRN.   DVT prophylaxis: Lovenox 30mg Daily x 4 weeks  Wound Care: Aquacel x 7 days.  See wound care instructions below for patient.  Pain management: Orals as needed. IV for breakthrough pain only.   X-rays: AP Pelvis and Lateral R hip in PACU. Completed. R foot x-rays completed.  Physical Therapy: Mobilization, ROM, ADL's, Posterior hip precautions.    Occupational Therapy: ADL's  Labs: Trend Hgb on POD #1, 2.   Consults: PT, OT. Medicine, SW/CC  Follow-up: Clinic in 2 weeks for wound check, message for outpatient follow with Dr. Phan sent.     Disposition: okay to CA from ortho perspective, will sign off on pt.     Wound Care instructions for patient: Your incision was closed with sutures underneath the skin. If you have a brown/tan rubber-like dressing (called Aquacel) applied to your surgical site, you may shower over this and pat dry afterward. You may remove the dressing 1 week after surgery. You may replace this with gauze and tape. If you would like to keep covered, change the bandages every other day and keep wound clean and dry. Showering is allowed if your incision is dry. No scrubbing or submerging your incision in standing water such as a bath x 4 weeks. Steri-strips (small white tape that is directly on the incision areas), if present,  should be left on until they fall off or are removed at your first office visit. You may also note strands of suture from each end of your incision - this is normal and is a knotless type of suture that can be trimmed at its base around 2 weeks from your surgery, otherwise it may be left to fall off on its own.    Assessment and plan discussed with Dr. Phan.    Boris Cheng MD  Orthopaedic Surgery Resident, PGY1   Pager: [phone][GS1.1]       Revision History        User Key Date/Time User Provider Type Action    > GS1.2 7/30/2018  8:07 AM Boris Cheng MD Resident Sign     GS1.1 7/30/2018  7:56 AM Boris Cheng MD Resident             Progress Notes by Kira Yeboah APRN CNP at 7/27/2018  9:01 AM     Author:  Kira Yeboah APRN CNP Service:  Anesthesiology Author Type:  Nurse Practitioner    Filed:  7/27/2018  2:01 PM Date of Service:  7/27/2018  9:01 AM Creation Time:  7/27/2018  9:01 AM    Status:  Attested :  Kira Yeboah APRN CNP (Nurse Practitioner)    Cosigner:  José Luis Mckeon MD at 7/29/2018  8:49 PM        Attestation signed by José Luis Mckeon MD at 7/29/2018  8:49 PM        Physician Attestation   I, José Luis Mckeon, saw and evaluated Elizabeth Taylor as part of a shared visit.  I have reviewed and discussed with the advanced practice provider their history, physical and plan.    I personally reviewed the vital signs, medications and labs.    My key history or physical exam findings: as above    Key management decisions made by me: as above    José Luis Mckeon  Date of Service (when I saw the patient): 7/27/18                               REGIONAL ANESTHESIA PAIN SERVICE CONTINUOUS NERVE INFUSION NOTE  Elizabeth Taylor is a 78 year old female POD #[CP1.1]1[CP1.2] s/p ARTHROPLASTY HIP  ANESTHESIA OUT OF OR and placement of[CP1.1] right fascia iliaca[CP1.2] catheter for pain control.    SUBJECTIVE:  Interval History: Overnight[CP1.1] no acute  events[CP1.2].  Patient[CP1.1] awake[CP1.2], reports[CP1.1] it is difficult for her to move right foot and ankle, good[CP1.2] pain control with nerve block continuous infusion and current analgesics (see below).[CP1.1]  denies p[CP1.2]aresthesias, circumoral numbness, metallic taste or tinnitus. Patient[CP1.1] has not been OOB yet[CP1.2].  Currently[CP1.1] denies[CP1.2] nausea or vomiting;[CP1.1] feels hungry, tolerating diet as ordered[CP1.2].       Clinically Aligned Pain Assessment (CAPA):   Comfort (How is your pain?):[CP1.1] Comfortably manageable[CP1.2]  Change in Pain (Since your last medication/intervention?):[CP1.1] About the same[CP1.2]  Pain Control (How are your pain treatments working?):[CP1.1]  Fully effective pain control[CP1.2]  Functioning (Are you able to do activities to get better?) :[CP1.1] Can do most things, but pain gets in the way of some[CP1.2]        Numerical Rating Scale (NRS):[CP1.1]  3/10[CP1.2] at rest and[CP1.1] 5/10[CP1.2] with activity      Antithrombotic/Thrombolytic Therapy ordered:[CP1.1]    enoxaparin (LOVENOX) injection 30 mg 30 mg, SC, Q24H Given: 07/27 1035[CP1.2]         Medications related to Pain Management (Future)    Start     Dose/Rate Route Frequency Ordered Stop    07/26/18 2159  lidocaine 1 % 1 mL      1 mL Other EVERY 1 HOUR PRN 07/26/18 2159 07/26/18 2159  lidocaine (LMX4) cream       Topical EVERY 1 HOUR PRN 07/26/18 2159 07/26/18 1015  ROPivacaine 0.2% (NAROPIN) 750 mL in ON-Q C-Bloc select flow ([id number] holds 600-750 mL) single cath disposable pump      8 mL/hr  8 mL/hr  Irrigation Elastomeric Pump 07/26/18 1008      07/26/18 0000  acetaminophen (TYLENOL) tablet 1,000 mg      1,000 mg Oral EVERY 8 HOURS 07/25/18 2309 07/26/18 0000  Lidocaine (LIDOCARE) 4 % Patch 1 patch      1 patch  over 12 Hours Transdermal EVERY 24 HOURS 2000 07/25/18 2309 07/26/18 0000  lidocaine patch in PLACE       Transdermal EVERY 8 HOURS 07/25/18 2309 07/25/18  2309  methocarbamol (ROBAXIN) tablet 500 mg      500 mg Oral 2 TIMES DAILY PRN 07/25/18 2309 07/25/18 2309  oxyCODONE IR (ROXICODONE) half-tab 2.5-5 mg      2.5-5 mg Oral EVERY 3 HOURS PRN 07/25/18 2309 07/25/18 2309  polyethylene glycol (MIRALAX/GLYCOLAX) Packet 17 g      17 g Oral DAILY PRN 07/25/18 2309 07/25/18 2309  HYDROmorphone (DILAUDID) injection 0.2 mg      0.2 mg Intravenous EVERY 2 HOURS PRN 07/25/18 2309 07/25/18 2309  bisacodyl (DULCOLAX) Suppository 10 mg      10 mg Rectal DAILY PRN 07/25/18 2309             OBJECTIVE:  Lab results  Recent Labs   Lab Test  07/27/18   0759   WBC  7.4   RBC  3.98   HGB  11.3*   HCT  35.0   MCV  88   MCH  28.4   MCHC  32.3   RDW  13.8   PLT  329       Lab Results   Component Value Date    INR 1.05 07/25/2018    INR 1.13 04/04/2018    INR 1.18 04/03/2018         Vitals:    Temp:  [96  F (35.6  C)-97.4  F (36.3  C)] 96.8  F (36  C)  Heart Rate:  [61-98] 98  Resp:  [7-24] 18  BP: (102-179)/() 122/64  SpO2:  [92 %-100 %] 92 %    Exam:   GEN:[CP1.1] alert and no distress[CP1.2]  NEURO/MSK: Extent of sensory block tested with[CP1.1] sharp tip[CP1.2]:[CP1.1] R[CP1.2])[CP1.1] L3[CP1.2]-[CP1.1]S1[CP1.2]  Strength[CP1.1] L[CP1.2]LE[CP1.1] 5[CP1.2]/5,[CP1.1] RLE weakness consistent with fascia iliaca compartment block (FICB)[CP1.2]  SKIN:[CP1.1] right fascia iliaca[CP1.2] catheter site with dressing c/d/i, no tenderness, erythema, heme, edema      ASSESSMENT/PLAN:    Patient is receiving adequate analgesia with current multimodal therapy including[CP1.1] right fascia iliaca[CP1.2] catheter infusion[CP1.1] Ro[CP1.2]pivacaine[CP1.1] 0.2[CP1.2]%[CP1.1] at 8[CP1.2] mL/hour. Motor function[CP1.1] consistent with nerve block[CP1.2] and adequate sensory block,[CP1.1] is[CP1.2] meeting activity goals.[CP1.1]  No[CP1.2] evidence of adverse side effects related to local anesthetic.     - continue current[CP1.1] Ro[CP1.2]pivacaine[CP1.1] 0.2[CP1.2]%[CP1.1] infusion  ra[CP1.2]te[CP1.1] 8[CP1.2] mL/hour  - expected change of next On-Q pump is[CP1.1] 2-3 days[CP1.2]  - antithrombotic/thrombolytic therapy[CP1.1] okay to administer enoxaparin subQ as[CP1.2] ordered. Please contact [facility] (#[phone]) prior to any medication changes  - will continue to follow and adjust as needed    - discussed plan with attending anesthesiologist    GRAHAM Marcelo CNP  [facility]  7/27/2018 9:01 AM    [facility]APS Contact Info (24 hour job code pager is the last 4 digits) For in-house use only:   [employer] phone: [place] [phone], [place] [phone], [employer] [phone], then enter call-back number.    Text: Use [email or web address] on the Intranet <Paging/Directory> tab and enter Jobcode ID.   If no call back at any time, contact the hospital  and ask for RAPS attending or backup [CP1.1]       Revision History        User Key Date/Time User Provider Type Action    > CP1.2 7/27/2018  2:01 PM Kira Yeboah APRN CNP Nurse Practitioner Sign     CP1.1 7/27/2018  9:01 AM Kira Yeboah APRN CNP Nurse Practitioner             Progress Notes by Doron Guevara DO at 7/28/2018  1:55 PM     Author:  Doron Guevara DO Service:  Anesthesiology Author Type:  Resident    Filed:  7/28/2018  2:02 PM Date of Service:  7/28/2018  1:55 PM Creation Time:  7/28/2018  1:55 PM    Status:  Attested :  Doron Guevara DO (Resident)    Cosigner:  José Luis Mckeon MD at 7/29/2018  8:47 PM        Attestation signed by José Luis Mckeon MD at 7/29/2018  8:47 PM        Physician Attestation   José Luis RYDER, saw and evaluated Elizabeth Taylor as part of a shared visit.  I have reviewed and discussed with the advanced practice provider their history, physical and plan.    I personally reviewed the vital signs, medications and labs.    My key history or physical exam findings: as above    Key management decisions made by me: as above    José Luis Mckeon  Date of Service (when I saw the  patient): 7/28/18                               REGIONAL ANESTHESIA PAIN SERVICE CONTINUOUS NERVE INFUSION NOTE  Elizabeth Taylor is a 78 year old female POD #2 s/p ARTHROPLASTY HIP  ANESTHESIA OUT OF OR and placement of right fascia iliaca catheter for pain control.    SUBJECTIVE:  Interval History: Overnight events: No acute events.  Patient reports good pain control with nerve block continuous infusion and current analgesics (see below).  Denies weakness, paresthesias, circumoral numbness, metallic taste or tinnitus. Patient ambulating with assistance.  Currently denies nausea or vomiting; tolerating a regular diet.       Clinically Aligned Pain Assessment (CAPA):   Comfort (How is your pain?): Comfortably manageable  Change in Pain (Since your last medication/intervention?): Getting better  Pain Control (How are your pain treatments working?):  Fully effective pain control  Functioning (Are you able to do activities to get better?) : Can do everything I need to do  Sleep (Does your pain management allow you to sleep or rest?): Normal sleep     Numerical Rating Scale (NRS):  1/10 at rest and 2/10 with activity      Antithrombotic/Thrombolytic Therapy ordered:  Lovenox 30 mg subcutaneous q24hr    Medications related to Pain Management (Future)    Start     Dose/Rate Route Frequency Ordered Stop    07/27/18 0930  diclofenac (FLECTOR) 1.3 % Patch 1 patch      1 patch Transdermal 2 TIMES DAILY 07/27/18 0912      07/27/18 0915  diclofenac (FLECTOR) Patch in Place       Transdermal EVERY 8 HOURS 07/27/18 0912      07/26/18 2159  lidocaine 1 % 1 mL      1 mL Other EVERY 1 HOUR PRN 07/26/18 2159      07/26/18 2159  lidocaine (LMX4) cream       Topical EVERY 1 HOUR PRN 07/26/18 2159      07/26/18 1015  ROPivacaine 0.2% (NAROPIN) 750 mL in ON-Q C-Bloc select flow ([id number] holds 600-750 mL) single cath disposable pump      8 mL/hr  8 mL/hr  Irrigation Elastomeric Pump 07/26/18 1008      07/26/18 0000  acetaminophen  (TYLENOL) tablet 1,000 mg      1,000 mg Oral EVERY 8 HOURS 07/25/18 2309 07/25/18 2309  methocarbamol (ROBAXIN) tablet 500 mg      500 mg Oral 2 TIMES DAILY PRN 07/25/18 2309 07/25/18 2309  oxyCODONE IR (ROXICODONE) half-tab 2.5-5 mg      2.5-5 mg Oral EVERY 3 HOURS PRN 07/25/18 2309 07/25/18 2309  polyethylene glycol (MIRALAX/GLYCOLAX) Packet 17 g      17 g Oral DAILY PRN 07/25/18 2309 07/25/18 2309  HYDROmorphone (DILAUDID) injection 0.2 mg      0.2 mg Intravenous EVERY 2 HOURS PRN 07/25/18 2309 07/25/18 2309  bisacodyl (DULCOLAX) Suppository 10 mg      10 mg Rectal DAILY PRN 07/25/18 2309             OBJECTIVE:  Lab results  Recent Labs   Lab Test  07/28/18   0716   WBC  8.5   RBC  3.86   HGB  10.8*   HCT  34.3*   MCV  89   MCH  28.0   MCHC  31.5   RDW  14.3   PLT  283       Lab Results   Component Value Date    INR 1.05 07/25/2018    INR 1.13 04/04/2018    INR 1.18 04/03/2018         Vitals:    Temp:  [97.6  F (36.4  C)-99.1  F (37.3  C)] 97.6  F (36.4  C)  Heart Rate:  [89-98] 89  Resp:  [16-18] 16  BP: (104-130)/(52-56) 130/52  SpO2:  [93 %-96 %] 94 %    Exam:   GEN: alert and no distress  Strength BLE 5/5  and overall symmetric, extremities normal- no gross deformities noted  SKIN: right fascia iliaca catheter site with dressing c/d/i, no tenderness, erythema, heme, edema      ASSESSMENT/PLAN:    Patient is receiving adequate analgesia with current multimodal therapy including  fascia iliaca catheter infusion ropivacaine 0.2% total infusion 8mL/hour. Good Motor function  and adequate sensory block,  meeting activity goals.  No evidence of adverse side effects related to local anesthetic.     - continue current ropivacaine 0.2% total infusion rate 8 mL/hour  - Please contact RAPS service a day prior to plan discharge date so infusion can be stopped and pain control assessed without block in place  - antithrombotic/thrombolytic therapy  ordered. Plan: Please contact [facility] (#[phone]) prior  to any medication changes  - will continue to follow and adjust as needed    - discussed plan with attending anesthesiologist    Doron Guevara DO  [facility]  7/28/2018 1:55 PM    [facility] Contact Info (24 hour job code pager is the last 4 digits) For in-house use only:   [employer] phone: [place] [phone], [place] [phone], Peds [phone], then enter call-back number.    Text: Use [employer] on the Intranet <Paging/Directory> tab and enter Jobcode ID.   If no call back at any time, contact the hospital  and ask for RAPS attending or backup [RC1.1]       Revision History        User Key Date/Time User Provider Type Action    > RC1.1 7/28/2018  2:02 PM Doron Guevara DO Resident Sign            Progress Notes by Larry Al MD at 7/29/2018  1:33 PM     Author:  Larry Al MD Service:  Anesthesiology Author Type:  Resident    Filed:  7/29/2018  1:35 PM Date of Service:  7/29/2018  1:33 PM Creation Time:  7/29/2018  1:33 PM    Status:  Attested :  Larry Al MD (Resident)    Cosigner:  José Luis Mckeon MD at 7/29/2018  8:45 PM        Attestation signed by José Luis Mckeon MD at 7/29/2018  8:45 PM        Physician Attestation   IJosé Luis, saw and evaluated Elizabeth Taylor as part of a shared visit.  I have reviewed and discussed with the advanced practice provider their history, physical and plan.    I personally reviewed the vital signs, medications and labs.    My key history or physical exam findings: as above      Key management decisions made by me: as above    José Luis Mckeon  Date of Service (when I saw the patient): 07/29/18                               REGIONAL ANESTHESIA PAIN SERVICE CONTINUOUS NERVE INFUSION NOTE  Elizabeth Taylor is a 78 year old female POD #3 s/p ARTHROPLASTY HIP  ANESTHESIA OUT OF OR and placement of right fascia iliaca catheter for pain control.    SUBJECTIVE:  Interval History: Overnight events: No  acute events.  Patient reports good pain control with nerve block continuous infusion and current analgesics (see below).  Denies weakness, paresthesias, circumoral numbness, metallic taste or tinnitus. Patient ambulating with assistance.  Currently denies nausea or vomiting; tolerating a regular diet.       Clinically Aligned Pain Assessment (CAPA):   Comfort (How is your pain?): Comfortably manageable  Change in Pain (Since your last medication/intervention?): Getting better  Pain Control (How are your pain treatments working?):  Fully effective pain control  Functioning (Are you able to do activities to get better?) : Can do everything I need to do  Sleep (Does your pain management allow you to sleep or rest?): Normal sleep     Numerical Rating Scale (NRS):  5/10 at rest and 5/10 with activity      Antithrombotic/Thrombolytic Therapy ordered:  Lovenox 30 mg subcutaneous q24hr     OBJECTIVE:  Lab results    Vitals:[SR1.1]    Temp:  [95.9  F (35.5  C)-97.7  F (36.5  C)] 97.7  F (36.5  C)  Pulse:  [85-88] 85  Heart Rate:  [97] 97  Resp:  [16] 16  BP: (102-126)/(48-65) 125/59  SpO2:  [97 %-99 %] 98 %[SR1.2]    Exam:   GEN: alert and no distress  Strength BLE 5/5  and overall symmetric, extremities normal- no gross deformities noted  SKIN: right fascia iliaca catheter site with dressing c/d/i, no tenderness, erythema, heme, edema      ASSESSMENT/PLAN:    Patient is receiving adequate analgesia with current multimodal therapy including  fascia iliaca catheter infusion ropivacaine 0.2% total infusion 8mL/hour. Good Motor function  and adequate sensory block,  meeting activity goals.  No evidence of adverse side effects related to local anesthetic.     - CLAMP TRIAL for current ropivacaine 0.2% total infusion in anticipation of discharge to [facility] tomorrow (location per patient)  - antithrombotic/thrombolytic therapy  ordered. Plan: Please contact [facility] (#[phone]) prior to any medication  changes  - will continue to follow and adjust as needed  - discussed plan with attending anesthesiologist    Larry Al MD  [facility][SR1.1]  7/29/2018 1:35 PM[SR1.3]    [facility] Contact Info (24 hour job code pager is the last 4 digits) For in-house use only:   [employer] phone: [place] [phone], [place] [phone], Peds [phone], then enter call-back number.    Text: Use [employer] on the Intranet <Paging/Directory> tab and enter Jobcode ID.   If no call back at any time, contact the hospital  and ask for RAPS attending or backup [SR1.1]       Revision History        User Key Date/Time User Provider Type Action    > SR1.3 7/29/2018  1:35 PM Larry Al MD Resident Sign     SR1.2 7/29/2018  1:34 PM Larry Al MD Resident      SR1.1 7/29/2018  1:33 PM Larry Al MD Resident             Progress Notes by Opal Padgett LSW at 7/29/2018 11:58 AM     Author:  Opal Padgett LSW Service:  Social Work Author Type:      Filed:  7/29/2018  4:06 PM Date of Service:  7/29/2018 11:58 AM Creation Time:  7/29/2018 11:58 AM    Status:  Addendum :  Opal Padgett LSW ()         Social Work Services Progress Note    Hospital Day: 5  Date of Initial Social Work Evaluation:  Not completed  Collaborated with:  Pt's sister Sahil- per pt request    Data:  Chart review indicates, Elizabeth Taylor is a 78 year old female with right femoral neck fracture now s/p right hip hemiarthroplasty on 7/26 with Dr. Phan    Intervention:[JH1.1]  Received a return call from pt's sister Sahil. She states the pt was admitted to TCU on 4/3 and was discharged 6/19. We discussed the Medicare coverage in detail for STR. I explained that we will contact the [employer], business office, to determine exactly how many days are left in her 100 day period as it is most likely almost gone.     Discussion about whether or not the pt would qualify for MA.  Sahil explained the pt receives approx 900.00 monthly but has a significant amount of money in her savings, etc. We discussed the elderly waiver guidelines and explained that as her savings decrease she would most likely be eligible for the EW. STR SW can offer more assistance on this topic as well.     Discussion about stretcher transport. Sahil is aware this is an out of pocket expense and is agreeable to the pt using this service if she needs it. She explained that the pt lives with her and her . They will be out of town until 8/11 and that she needs her to be at the TCU until than at a minimum as they won't be home to care for her.[JH1.2]    Assessment:  SW to assist with TCU placement.    Plan:    Anticipated Disposition:  Facility:  [facility] pending bed availabilty    Barriers to d/c plan:  Medical readiness and TCU acceptance.    Follow Up:  SW to continue monitoring and assisting with TCU placement.    UQYNH Weathers  [facility] Weekend   4A, 4C, 4E, 5A and 5B; pager [phone]  6A, 6B, 6C and 6D; pager [phone]  7A, 7B, 7C, 7D and 5C; pager [phone][JH1.1]           Revision History        User Key Date/Time User Provider Type Action    > JH1.2 7/29/2018  4:06 PM Opal Padgett LSW  Addend     JH1.1 7/29/2018 12:01 PM Opal Padgett LSW  Sign            Progress Notes by Boris Cheng MD at 7/29/2018  9:03 AM     Author:  Boris Cheng MD Service:  Orthopedics Author Type:  Resident    Filed:  7/29/2018  9:16 AM Date of Service:  7/29/2018  9:03 AM Creation Time:  7/29/2018  9:03 AM    Status:  Addendum :  Boris Cheng MD (Resident)         Orthopaedic Surgery Progress Note:       Subjective:   NAEO. Pain is controlled on oral meds. Tolerating oral diet. Voiding+/bm+. Denies fever/chills/SOB/chest pain/nause/vomiting/new numbness/tinging.    Has not ambulated, planning to ambulate today.   Reports a R proximal midfoot  pain.    Objective:[GS1.1]   Temp:  [95.9  F (35.5  C)-97.7  F (36.5  C)] 97.7  F (36.5  C)  Pulse:  [85-88] 85  Heart Rate:  [97] 97  Resp:  [16] 16  BP: (102-126)/(48-65) 125/59  SpO2:  [97 %-99 %] 98 %[GS1.2]    Intake/Output Summary (Last 24 hours) at 07/27/18 0753  Last data filed at 07/27/18 0720   Gross per 24 hour   Intake             1684 ml   Output              780 ml   Net              904 ml       Gen: NAD. Resting comfortably in bed  Resp: Breathing comfortably[GS1.1] on room air[GS1.3]  RLE:  aquacel is c/d/i, 1 minimal scant drainage (2-4mm). Abduction pillow in place.  SILT in femoral, saphenous, sural, deep peroneal, superficial peroneal, and tibial n dist.   Fires EHL/FHL/TA/Gastroc with 4/5 strength      Foot skin intact, not swollen, nonerythematous, nontender to palpation at posterior/lateral/medial malleolus, weakly able to dorsiflex/plantar flexion intact tender to palpation proximal anterior midfoot, is wwp    Labs:[GS1.1]    Recent Labs  Lab 07/29/18  0650 07/28/18  0716 07/27/18  0759 07/26/18  0652   WBC  --  8.5 7.4 5.7   HGB 9.8* 10.8* 11.3* 11.9    [phone][GS1.2]     7/26 OR cultures - NGTD     Assessment & Plan:   Post-Op Plan:  Assessment/Plan: Elizabeth Taylor is a 78 year old female with right femoral neck fracture now s/p right hip hemiarthroplasty on 7/26 with Dr. Phan.    7/27 - C. Diff positive.  7/29 - tender to palpation at[GS1.1] R[GS1.3] anterior, proximal midfoot[GS1.1].[GS1.3] foot x-rays ordered     Trauma Primary  Activity: Up with assist and assistive devices as needed until independent. Posterior hip precautions x 3 months.   Weight bearing status: WBAT  ABD Pillow at all times when at rest and in bed for 6 weeks  Antibiotics: Clinda x 24 hours. -- C diff positive; abx per primary team  Diet: adat. Bowel regimen. Anti-emetics PRN.   DVT prophylaxis: Lovenox 30mg Daily x 4 weeks  Wound Care: Aquacel x 7 days.    Pain management: Orals as needed. IV for  breakthrough pain only.   X-rays: AP Pelvis and Lateral R hip in PACU. Completed. R foot x-rays pending.  Physical Therapy: Mobilization, ROM, ADL's, Posterior hip precautions.    Occupational Therapy: ADL's  Labs: Trend Hgb on POD #1, 2.   Consults: PT, OT. Medicine, SW/CC  Follow-up: Clinic in 2 weeks for wound check     Disposition: pending R foot x-rays, if no pain will sign off, if medically stable anticipate discharge to TCU/ARU in 1-2 days.        Assessment and plan discussed with Dr. Phan.    Boris Cheng MD  Orthopaedic Surgery Resident, PGY1   Pager: [phone][GS1.1]       Revision History        User Key Date/Time User Provider Type Action    > GS1.3 7/29/2018  9:16 AM Boris Cheng MD Resident Addend     GS1.2 7/29/2018  9:09 AM Boris Cheng MD Resident Sign     GS1.1 7/29/2018  9:03 AM Boris Cheng MD Resident             Progress Notes by Opal Padgett LSW at 7/28/2018  3:04 PM     Author:  Opal Padgett LSW Service:  Social Work Author Type:      Filed:  7/28/2018  3:57 PM Date of Service:  7/28/2018  3:04 PM Creation Time:  7/28/2018  3:04 PM    Status:  Signed :  Opal Padgett LSW ()         Social Work Services Progress Note    Hospital Day: 4  Date of Initial Social Work Evaluation:[JH1.1]  Not compelted[JH1.2]  Collaborated with:[JH1.1]  Pt, [facility][JH1.2]    Data:[JH1.1]  Chart review indicates, Elizabeth Taylor is a 78 year old female with right femoral neck fracture now s/p right hip hemiarthroplasty on 7/26 with Dr. Phan    [JH1.2]  Intervention:  Chart review performed which indicates the pt may be out of STR days. SW met with the pt and introduced self and role. We discussed her potential DC on Monday and that the [facility] would be happy to have her there again.[JH1.1] She states she would like to return there as well.[JH1.2]    Conversation about Medicare coverage[JH1.1] with the pt. She asked me to  "call her sister Sahil and discuss details with her instead. I briefly explained the 80% coverage for days  and that she would have a new set of 20 day, 100% coverage if 60 days was in between her stays. She thought she was only at the [facility] for 20 days.[JH1.2] Phone call made to [facility] to inquire how long she was previously there, etc. I spoke with the RN supervisor, Danitza, who stated they \"would love to have her back\". I will fax admission paperwork to them to [phone] per Danitza's request.[JH1.1] Weekday admission staff jhon be able to see how many days she has used, if any, of her Medicare days as they may have reset by now.     Conversation about transportation with the pt. She states she took a stretcher the last time she went to the [facility], but also states the pt's friend, Katherine, can help if needed. I explained to her that insurance does not always cover a stretcher, and again she asked me to call her sister Sahil.    I left a VM for Sahil asking for her to page me.[JH1.2]     Assessment:[JH1.1]  SW will continue to assist with DC planning as needed.[JH1.2]    Plan:    Anticipated Disposition:[JH1.1]  Facility:  [facility][JH1.2]    Barriers to d/c plan:[JH1.1]  Medical readiness[JH1.2]    Follow Up:[JH1.1]  SW will continue to assist as needed.    QUYNH Weathers  [facility] Weekend   4A, 4C, 4E, 5A and 5B; pager [phone]  6A, 6B, 6C and 6D; pager [phone]  7A, 7B, 7C, 7D and 5C; pager [phone][JH1.2]           Revision History        User Key Date/Time User Provider Type Action    > JH1.2 7/28/2018  3:57 PM Opal Padgett LSW  Sign     JH1.1 7/28/2018  3:04 PM Opal Padgett LSW              Progress Notes by Heather Otto OT at 7/28/2018 11:43 AM     Author:  Heather Otto OT Service:  (none) Author Type:  Occupational Therapist    Filed:  7/28/2018 11:43 AM Date of Service:  7/28/2018 11:43 AM Creation " "Time:  7/28/2018 11:43 AM    Status:  Signed :  Heather Otto OT (Occupational Therapist)          07/28/18 0317   Quick Adds   Type of Visit Initial Occupational Therapy Evaluation   Living Environment   Lives With sibling(s)   Living Arrangements house   Number of Stairs to Enter Home 4   Number of Stairs Within Home 4   Living Environment Comment Per PT, patient had been staying with sister following a TCU stay, patient had stayed at home for 2 weeks following fx.   Self-Care   Usual Activity Tolerance moderate   Current Activity Tolerance fair   Equipment Currently Used at Home walker, rolling   Activity/Exercise/Self-Care Comment Patient had recently undergone TCU stay for chemo/radiation endurance and rib fractures, had DC'd to sister's house with independence with basic ADLs and ambulation with walker.  Patient felt \"lightning sharp\" pain in R  hip and fell at this point.  Prior to TCU stays/chemo (patient has difficulty with timeline) was independent with ADLs/mobility without AE.   Functional Level Prior   Ambulation 0-->independent   Transferring 0-->independent   Toileting 0-->independent   Bathing 0-->independent   Dressing 0-->independent   Eating 0-->independent   Communication 0-->understands/communicates without difficulty   Swallowing 0-->swallows foods/liquids without difficulty   Fall history within last six months yes   Prior Functional Level Comment Patient had recently undergone TCU stay for chemo/radiation endurance and rib fractures, had DC'd to sister's house with independence with basic ADLs and ambulation with walker.  Patient felt \"lightning sharp\" pain in R  hip and fell at this point.  Prior to TCU stays/chemo (patient has difficulty with timeline) was independent with ADLs/mobility without AE.   General Information   Onset of Illness/Injury or Date of Surgery - Date 07/26/18   Referring Physician Dr. Phan   Additional Occupational Profile Info/Pertinent History of Current " Problem Elizabeth Taylor is a 78 year old female with right femoral neck fracture now s/p right hip hemiarthroplasty on 7/26 ; hx also includes Stage III anal cancer, R side rib fx   Precautions/Limitations right hip precautions   Weight-Bearing Status - RLE weight-bearing as tolerated   Cognitive Status Examination   Level of Consciousness alert   Able to Follow Commands WNL/WFL   Cognitive Comment No cognitive concerns   Sensory Examination   Sensory Comments No N/T noted   Pain Assessment   Patient Currently in Pain Yes, see Vital Sign flowsheet   Mobility   Bed Mobility Bed mobility skill: Sit to supine;Bed mobility skill: Supine to sit   Bed Mobility Skill: Sit to Supine   Level of Cook: Sit/Supine maximum assist (25% patients effort)   Physical Assist/Nonphysical Assist: Sit/Supine 2 persons   Bed Mobility Skill: Supine to Sit   Level of Cook: Supine/Sit maximum assist (25% patients effort)   Physical Assist/Nonphysical Assist: Supine/Sit 2 persons   Transfer Skill: Bed to Chair/Chair to Bed   Level of Cook: Bed to Chair minimum assist (75% patients effort)   Physical Assist/Nonphysical Assist: Bed to Chair 2 persons   Weight-Bearing Restrictions weight-bearing as tolerated   Assistive Device - Transfer Skill Bed to Chair Chair to Bed Rehab Eval standard walker   Transfer Skill: Sit to Stand   Level of Cook: Sit/Stand minimum assist (75% patients effort)   Physical Assist/Nonphysical Assist: Sit/Stand 2 persons   Transfer Skill: Sit to Stand weight-bearing as tolerated   Assistive Device for Transfer: Sit/Stand standard walker   Transfer Skill: Toilet Transfer   Level of Cook: Toilet unable to perform   Bathing   Level of Cook maximum assist (25% patients effort)   Upper Body Dressing   Level of Cook: Dress Upper Body other (see comments)  (not assessed)   Lower Body Dressing   Level of Cook: Dress Lower Body maximum assist (25% patients  "effort)   Toileting   Level of Davison: Toilet dependent (less than 25% patients effort)   Grooming   Level of Davison: Grooming stand-by assist   Eating/Self Feeding   Level of Davison: Eating independent   Activities of Daily Living Analysis   Impairments Contributing to Impaired Activities of Daily Living fear and anxiety;pain;post surgical precautions;ROM decreased;strength decreased   General Therapy Interventions   Planned Therapy Interventions ADL retraining;strengthening   Clinical Impression   Criteria for Skilled Therapeutic Interventions Met yes, treatment indicated   OT Diagnosis impaired self-cares/mobility   Influenced by the following impairments pain; decreased ROM   Assessment of Occupational Performance 1-3 Performance Deficits   Identified Performance Deficits dressing, bathing, toileting   Clinical Decision Making (Complexity) Low complexity   Therapy Frequency other (see comments)  (6x/week)   Predicted Duration of Therapy Intervention (days/wks) 4-5 days   Anticipated Discharge Disposition Transitional Care Facility   Risks and Benefits of Treatment have been explained. Yes   Patient, Family & other staff in agreement with plan of care Yes   [facility] TM \"6 Clicks\"   2016, Trustees of [facility], under license to [employer].  All rights reserved.   6 Clicks Short Forms Daily Activity Inpatient Short Form   [facility]  \"6 Clicks\" Daily Activity Inpatient Short Form   1. Putting on and taking off regular lower body clothing? 2 - A Lot   2. Bathing (including washing, rinsing, drying)? 2 - A Lot   3. Toileting, which includes using toilet, bedpan or urinal? 1 - Total   4. Putting on and taking off regular upper body clothing? 3 - A Little   5. Taking care of personal grooming such as brushing teeth? 3 - A Little   6. Eating meals? 4 - None   Daily Activity Raw Score (Score out of 24.Lower scores equate to lower levels of function) 15   Total " Evaluation Time   Total Evaluation Time (Minutes) 8[MW1.1]        Revision History        User Key Date/Time User Provider Type Action    > MW1.1 7/28/2018 11:43 AM Heather Otto OT Occupational Therapist Sign            Progress Notes by Boris Cheng MD at 7/28/2018  9:35 AM     Author:  Boris Cheng MD Service:  Orthopedics Author Type:  Resident    Filed:  7/28/2018  9:52 AM Date of Service:  7/28/2018  9:35 AM Creation Time:  7/28/2018  9:35 AM    Status:  Signed :  Boris Cheng MD (Resident)         Orthopaedic Surgery Progress Note:       Subjective:   NAEO. Pain is controlled on oral meds. Denies fever/chills/SOB/chest pain/nause/vomiting/new numbness/tinging.    Objective:   Temp:  [97.6  F (36.4  C)-100  F (37.8  C)] 97.6  F (36.4  C)  Heart Rate:  [89-98] 89  Resp:  [16-18] 16  BP: (104-130)/(52-56) 130/52  SpO2:  [93 %-96 %] 94 %    Intake/Output Summary (Last 24 hours) at 07/27/18 0753  Last data filed at 07/27/18 0720   Gross per 24 hour   Intake             1684 ml   Output              780 ml   Net              904 ml       Gen: NAD. Resting comfortably in bed  Resp: Breathing comfortably on NC  RLE:  aquacel is c/d/i, scant drainage (2-4mm). Abduction pillow in place.  SILT in femoral, saphenous, sural, deep peroneal, superficial peroneal, and tibial n dist.   Fires EHL/FHL/TA/Gastroc with 4/5 strength    foot is wwp    Labs:    Recent Labs  Lab 07/28/18  0716 07/27/18  0759 07/26/18  0652   WBC 8.5 7.4 5.7   HGB 10.8* 11.3* 11.9    329 348     7/26 OR cultures - NGTD     Assessment & Plan:   Post-Op Plan:  Assessment/Plan: Elizabeth Taylor is a 78 year old female with right femoral neck fracture now s/p right hip hemiarthroplasty on 7/26 with Dr. Phan.    7/27 - C. Diff positive.     Trauma Primary  Activity: Up with assist and assistive devices as needed until independent. Posterior hip precautions x 3 months.   Weight bearing status: WBAT  ABD Pillow at all times when at  rest and in bed for 6 weeks  Antibiotics: Clinda x 24 hours. -- C diff positive; abx per primary team  Diet: adat. Bowel regimen. Anti-emetics PRN.   DVT prophylaxis: Lovenox 30mg Daily x 4 weeks  Wound Care: Aquacel x 7 days.    Pain management: Orals as needed. IV for breakthrough pain only.   X-rays: AP Pelvis and Lateral R hip in PACU. Completed  Physical Therapy: Mobilization, ROM, ADL's, Posterior hip precautions.    Occupational Therapy: ADL's  Labs: Trend Hgb on POD #1, 2.   Consults: PT, OT. Medicine, SW/CC  Follow-up: Clinic in 2 weeks for wound check     Disposition: Will re-examine ankle tomorrow morning, if no pain will sign off, if medically stable anticipate discharge to TCU/ARU on POD #3.        Assessment and plan discussed with Dr. Phan.    Boris Cheng MD  Orthopaedic Surgery Resident, PGY1   Pager: [phone][GS1.1]       Revision History        User Key Date/Time User Provider Type Action    > GS1.1 7/28/2018  9:52 AM Boris Cheng MD Resident Sign            Progress Notes by Tamika Martinez MSW at 7/28/2018  7:24 AM     Author:  Tamika Martinez MSW Service:  Social Work Author Type:      Filed:  7/28/2018  7:26 AM Date of Service:  7/28/2018  7:24 AM Creation Time:  7/28/2018  7:24 AM    Status:  Signed :  Tamika Martinez MSW ()         Brief Social Work Note    SW given updated by RNCC that pt wants to return to [facility] [place].  It is unclear if pt has Medicare days to cover her stay as she does not have a secondary or supplemental insurance plan.  Will ask weekend team to update pt on copays through Medicare after day 21 as applicable.  Referral sent to TCU to assist with discharge.    GOMEZ Becerra, APSW  [address]   Phone: [phone]  Pager: [phone]  Unit: [phone][LH1.1]         Revision History        User Key Date/Time User Provider Type Action    > LH1.1 7/28/2018  7:26 AM Tamika Martinez  MSW  Sign            ED Provider Notes by Aakash Gutierrez MD at 7/25/2018  4:26 PM     Author:  Aakash Gutierrez MD Service:  Emergency Medicine Author Type:  Physician    Filed:  7/27/2018 10:42 AM Date of Service:  7/25/2018  4:26 PM Creation Time:  7/25/2018  4:56 PM    Status:  Signed :  Aakash Gutierrez MD (Physician)           History[PB1.1]     Chief Complaint   Patient presents with     Fall     Hip Pain[GL1.1]     HPI  Elizabeth Taylor is a 78 year old female[PB1.1] with history of anal cancer[PB1.2] who[PB1.1] presents to the emergency department for the evaluation of hip pain.  The patient reports that she sustained a ground-level mechanical fall approximately 2 weeks ago while ambulating through her living room when her hip gave out, and she took the brunt of her fall on her right hip.  The patient reports significant and immediate pain following the fall, and she has been unable to ambulate without assistance since.  She denies any previous history of injury or surgery to the right hip, and no distal numbness/weakness.      PAST MEDICAL HISTORY:[PB1.2]   Past Medical History:   Diagnosis Date     Anal cancer (H)      Endometriosis, site unspecified      Thyroiditis, unspecified     Thryoiditis-resolved[GL1.1]       PAST SURGICAL HISTORY:[PB1.2]   Past Surgical History:   Procedure Laterality Date     APPENDECTOMY      as a child     COLONOSCOPY N/A 4/13/2017    Procedure: COLONOSCOPY;  Surgeon: Juan Dos Santos MD;  Location: [facility] GI     INSERT PORT VASCULAR ACCESS Right 2/8/2018    Procedure: INSERT PORT VASCULAR ACCESS;  Place Single Lumen Venous Chest Port Placement;  Surgeon: Zaire Moreira PA-C;  Location: [facility] OR     SURGICAL HISTORY OF -       endometriosis[GL1.1]       FAMILY HISTORY:[PB1.2]   Family History   Problem Relation Age of Onset     Cancer Sister      Cancer Maternal Grandmother      lymphoma     HEART DISEASE Father       MI     Uterine Cancer Niece[GL1.1]        SOCIAL HISTORY:[PB1.2]   Social History   Substance Use Topics     Smoking status: Never Smoker     Smokeless tobacco: Never Used     Alcohol use No       Current Discharge Medication List      CONTINUE these medications which have NOT CHANGED    Details   acetaminophen (TYLENOL) 325 MG tablet Take 2 tablets (650 mg) by mouth every 4 hours as needed for pain    Associated Diagnoses: Closed fracture of one rib of right side, initial encounter      artificial saliva (BIOTENE DRY MOUTHWASH) LIQD liquid Swish and spit 5-10 mLs in mouth 4 times daily as needed for dry mouth    Associated Diagnoses: Mouth dryness      calcium-vitamin D (CALTRATE) 600-400 MG-UNIT per tablet Take 1 tablet by mouth daily  Qty: 60 tablet    Associated Diagnoses: Age-related osteoporosis with current pathological fracture, initial encounter      diphenoxylate-atropine (LOMOTIL) 2.5-0.025 MG per tablet Take 1 tablet by mouth 4 times daily as needed for diarrhea . If having less than two bowel movements daily, DO NOT GIVE  Qty: 40 tablet, Refills: 0    Associated Diagnoses: Diarrhea, unspecified type; Malignant neoplasm of anal canal (H)      Hydrocortisone (BUTT PASTE, WITH H.C,) Use topically 3-4 times daily  Qty: 1 Tube, Refills: 1    Comments: Not sure the size but can be a big tub or tube.  Associated Diagnoses: Vaginitis and vulvovaginitis      loperamide (LOPERAMIDE A-D) 2 MG tablet Take 1 tablet (2 mg) by mouth 4 times daily as needed for diarrhea . If having less than two bowel movements daily, DO NOT GIVE  Qty: 60 tablet, Refills: 3    Associated Diagnoses: Malignant neoplasm of anal canal (H)      menthol-zinc oxide (CALMOSEPTINE) 0.44-20.625 % OINT ointment Apply thick layer to irritated perianal skin 4 times per day and after bowel movements.  Qty: 1 Tube, Refills: 3    Associated Diagnoses: Malignant neoplasm of anal canal (H)      methocarbamol (ROBAXIN) 500 MG tablet Take 1 tablet (500  "mg) by mouth 2 times daily as needed for muscle spasms  Qty: 120 tablet, Refills: 0    Associated Diagnoses: Closed fracture of one rib of right side, initial encounter      simethicone (MYLICON) 40 MG/0.6ML suspension Take 0.6 mLs (40 mg) by mouth every 6 hours as needed for cramping    Associated Diagnoses: Malignant neoplasm of anal canal (H); Flatulence, eructation and gas pain      traMADol (ULTRAM) 50 MG tablet Take 1 tablet (50 mg) by mouth every 6 hours  Qty: 20 tablet, Refills: 0    Associated Diagnoses: Closed fracture of one rib of right side, initial encounter      vitamin B complex with vitamin C (VITAMIN  B COMPLEX) TABS tablet Take 1 tablet by mouth daily      ondansetron (ZOFRAN-ODT) 8 MG ODT tab Take 1 tablet (8 mg) by mouth every 8 hours as needed For nausea  Qty: 60 tablet    Associated Diagnoses: Malignant neoplasm of anal canal (H)         STOP taking these medications       multivitamin, therapeutic (THERA-VIT) TABS tablet Comments:   Reason for Stopping:                  Allergies   Allergen Reactions     Darvon [Propoxyphene]      Had reaction in teen years     Penicillins      As a child     Ketoconazole Rash[GL1.1]        I have reviewed the Medications, Allergies, Past Medical and Surgical History, and Social History in the Epic system.[PB1.1]    Review of Systems   Constitutional: Negative for fever.   Respiratory: Negative for shortness of breath.    Cardiovascular: Negative for chest pain.   Gastrointestinal: Negative for abdominal pain.   Musculoskeletal: Positive for joint swelling.        R hip pain   Skin: Negative for wound.   All other systems reviewed and are negative.[PB1.2]      Physical Exam[PB1.1]   BP: 133/84  Pulse: 90  Heart Rate: 90  Temp: 98.1  F (36.7  C)  Resp: 16  Height: 167.6 cm (5' 6\")  Weight: 47.9 kg (105 lb 9.6 oz)  SpO2: 97 %[GL1.1]      Physical Exam   Constitutional: She is[PB1.2] oriented to person, place, and time[GL1.1].[PB1.2] Vital signs are " normal[GL1.1]. She appears[PB1.2] well-developed[GL1.1] and[PB1.2] well-nourished[GL1.1].[PB1.2]  Non-toxic appearance[GL1.1]. She[PB1.2] does not appear ill[GL1.1]. No distress.   HENT:   Head:[PB1.2] Normocephalic[GL1.1] and[PB1.2] atraumatic[GL1.1].   Mouth/Throat:[PB1.2] Oropharynx is clear and moist[GL1.1]. No[PB1.2] oropharyngeal exudate[GL1.1].   Eyes:[PB1.2] Conjunctivae[GL1.1] and[PB1.2] EOM[GL1.1] are normal.[PB1.2] Pupils are equal, round, and reactive to light[GL1.1].[PB1.2] No scleral icterus[GL1.1].   Neck:[PB1.2] Normal range of motion[GL1.1].[PB1.2] Neck supple[GL1.1].[PB1.2] No JVD[GL1.1] present.[PB1.2] No tracheal deviation[GL1.1] present.[PB1.2] No thyromegaly[GL1.1] present.   Cardiovascular:[PB1.2] Normal rate[GL1.1],[PB1.2] regular rhythm[GL1.1],[PB1.2] normal heart sounds[GL1.1] and[PB1.2] intact distal pulses[GL1.1].  Exam reveals[PB1.2] no gallop[GL1.1] and[PB1.2] no friction rub[GL1.1].[PB1.2]    No murmur[GL1.1] heard.  Pulmonary/Chest:[PB1.2] Effort normal[GL1.1] and[PB1.2] breath sounds normal[GL1.1]. No[PB1.2] respiratory distress[GL1.1]. She exhibits[PB1.2] no tenderness[GL1.1].   Abdominal:[PB1.2] Soft[GL1.1].[PB1.2] Bowel sounds are normal[GL1.1]. She exhibits[PB1.2] no distension[GL1.1] and[PB1.2] no mass[GL1.1]. There is[PB1.2] no tenderness[GL1.1]. There is[PB1.2] no rebound[GL1.1] and[PB1.2] no guarding[GL1.1].   Musculoskeletal: She exhibits no[PB1.2] edema[GL1.1].        Right hip: She exhibits[PB1.2] decreased range of motion[GL1.1],[PB1.2] tenderness[GL1.1] and[PB1.2] bony tenderness[GL1.1].        Cervical back: She exhibits[PB1.2] no tenderness[GL1.1].        Thoracic back: She exhibits[PB1.2] no tenderness[GL1.1].        Lumbar back: She exhibits[PB1.2] no tenderness[GL1.1].        Legs:[PB1.2][GL1.1]  Lymphadenopathy:     She has[PB1.2] no cervical adenopathy[GL1.1].   Neurological: She is[PB1.2] alert[GL1.1] and[PB1.2] oriented to person, place, and time[GL1.1]. She  has[PB1.2] normal strength[GL1.1]. No[PB1.2] cranial nerve deficit[GL1.1] or[PB1.2] sensory deficit[GL1.1].   Skin: Skin is[PB1.2] warm[GL1.1] and[PB1.2] dry[GL1.1].[PB1.2] No rash[GL1.1] noted. No[PB1.2] erythema[GL1.1]. No[PB1.2] pallor[GL1.1].   Psychiatric: She has a[PB1.2] normal mood and affect[GL1.1]. Her[PB1.2] behavior is normal[GL1.1].[PB1.2]   Nursing note[GL1.1] and[PB1.2] vitals[GL1.1] reviewed.[PB1.2]      ED Course[PB1.1]     ED Course[GL1.1]     Procedures[PB1.1]                   Labs Ordered and Resulted from Time of ED Arrival Up to the Time of Departure from the ED   COMPREHENSIVE METABOLIC PANEL - Abnormal; Notable for the following:        Result Value    Albumin 3.2 (*)     All other components within normal limits   CBC WITH PLATELETS - Abnormal; Notable for the following:     Hemoglobin 11.5 (*)     All other components within normal limits   CBC WITH PLATELETS DIFFERENTIAL   INR   ABO/RH TYPE AND SCREEN           XR Femur Right 2 Views (Final result) Result time: 07/25/18 21:06:42     Final result by Opal Rodriguez MD (07/25/18 21:06:42)     Impression:     IMPRESSION: Redemonstration of right femoral neck fracture with  superior displacement of the distal fragment.     I have personally reviewed the examination and initial interpretation  and I agree with the findings.    OPAL RODRIGUEZ MD     Narrative:     XR FEMUR RT 2 VW  7/25/2018 6:45 PM      HISTORY: trauma;     COMPARISON: Earlier today    FINDINGS: Redemonstration of right femoral neck fracture with superior  displacement of the distal fragment. No other fractures identified.                 XR Chest 1 View (Final result) Result time: 07/25/18 21:06:04     Procedure changed from Chest XR,  PA & LAT          Final result by Opal Rodriguez MD (07/25/18 21:06:04)     Impression:     IMPRESSION:   1. Subacute right-sided rib fractures with no acute displaced fracture  identified.  2. Bilateral trace  pleural effusions.    I have personally reviewed the examination and initial interpretation  and I agree with the findings.    OPAL PAREKH MD     Narrative:     XR CHEST 1 VW  7/25/2018 6:44 PM      HISTORY: trauma;     COMPARISON: 4/2/2018    FINDINGS: Right-sided Port-A-Cath tip projects over the upper right  atrium. Cardiac silhouette is within normal limits. No pneumothorax.  Bilateral trace pleural effusions. Subacute right-sided rib fractures.  No acute displaced fracture.                 XR Pelvis w Hip Right G/E 2 Views (Final result) Result time: 07/25/18 18:29:50     Procedure changed from XR Hip Right 2-3 Views          Final result by Opal Parekh MD (07/25/18 18:29:50)     Impression:     Impression:    Displaced, foreshortened right femoral neck fracture.    I have personally reviewed the examination and initial interpretation  and I agree with the findings.    OPAL PAREKH MD     Narrative:     Exam:  XR PELVIS AND HIP RIGHT 2 VIEWS, 7/25/2018 5:52 PM    History: Trauma;     Comparison:  Pelvis MRI 5/25/2018, PET/CT 5/25/2018.    Findings:  AP and crosstable lateral views of the right hip.  Displaced, foreshortened right femoral neck fracture. Remaining  osseous structures are intact. Low lumbar predominant facet  arthropathy and mild degenerative changes in the left hip. Visualized  soft tissues are unremarkable.[GL1.1]            Assessments & Plan (with Medical Decision Making)[PB1.1]   This patient presented to the Emergency Department complaining of right hip pain.  She had fallen 2 weeks ago and has not been mobile over these past 2 weeks at home.  She is neurovascularly intact distally, but x-ray demonstrates displaced femoral neck fracture.  Orthopedics and trauma were both notified as was anesthesia to see about placement of femoral nerve block.  I did discuss possible nerve block with orthopedics and they are comfortable with this occurring.  She was given  0.5 mg of Dilaudid IV and preop blood work was sent.  Patient will be admitted to the trauma service for further treatment.    This part of the medical record was transcribed by Ladarius Christie, Medical Scribe, from a dictation done by Aakash Gutierrez MD.[CT1.1]       I have reviewed the nursing notes.    I have reviewed the findings, diagnosis, plan and need for follow up with the patient.[PB1.1]    Current Discharge Medication List          Final diagnoses:   Closed displaced fracture of neck of right femur (H)[GL1.1]   I, Chidi Renteria, am serving as a trained medical scribe to document services personally performed by Aakash Gutierrez MD, based on the provider's statements to me.      IAakash MD, was physically present and have reviewed and verified the accuracy of this note documented by Chidi Renteria.[PB1.2]       7/25/2018   [facility], EMERGENCY DEPARTMENT[PB1.1]     Aakash Gutierrez MD  07/27/18 1042  [GL1.2]     Revision History        User Key Date/Time User Provider Type Action    > GL1.2 7/27/2018 10:42 AM Aakash Gutierrez MD Physician Sign     GL1.1 7/27/2018 10:39 AM Aakash Gutierrez MD Physician      CT1.1 7/25/2018  8:10 PM Ladarius Christie     PB1.2 7/25/2018  5:16 PM Chidi Renteria Share     PB1.1 7/25/2018  5:01 PM Chidi Renteria            Progress Notes by Pauline Aguayo RN at 7/27/2018  9:28 AM     Author:  Pauline Aguayo RN Service:  (none) Author Type:  Registered Nurse    Filed:  7/27/2018  9:52 AM Encounter Date:  7/27/2018 Status:  Signed    :  Pauline Aguayo RN (Registered Nurse)           Spoke with pt's sister, Sahil, who called to report pt is currently admitted to [facility] and had 1/2 hip (ball) replacement surgery.  Pt will likely be discharged to a TCU in the future.  Pt has appts on Monday, 7/30/18 in [facility] and Dr. Rosas's office.  RN  stated would send message to teams about pt's admission so they can cancel appts.  Pt is scheduled to see Dr. Ray on Aug 31.  We will wait for now on changing pt's return appt with Dr. Ray as sometimes patients will come for visit while still in TCU (depending on their condition).  Sahil also stated pt has a scan scheduled for Monday but RN not seeing it on appt desk.[MC1.1]  Spoke with Tennille Brennan RN, radiation therapy, pt currently admitted.  She will take care of cancelling/rescheduling pt's appt for Monday, 7/30.  Sent message to [facility] surgery pool regarding need to cancel/reschedule pt's Monday, 7/30 appt with Dr. Rosas.[MC1.2]       Revision History        User Key Date/Time User Provider Type Action    > MC1.2 7/27/2018  9:52 AM Pauline Aguayo RN Registered Nurse Sign     MC1.1 7/27/2018  9:28 AM Pauline Aguayo RN Registered Nurse             Progress Notes by Rex Ballard MD at 7/27/2018  7:53 AM     Author:  Rex Ballard MD Service:  Orthopedics Author Type:  Resident    Filed:  7/27/2018  7:58 AM Date of Service:  7/27/2018  7:53 AM Creation Time:  7/27/2018  7:53 AM    Status:  Signed :  Rex Ballard MD (Resident)         Orthopaedic Surgery Progress Note:       Subjective:   NAEO. Pain is controlled. Now on enteric precautions. Yet to mobilize. Denies fever/chills/SOB/nause/vomiting.     Objective:   Temp:  [96  F (35.6  C)-97.4  F (36.3  C)] 96.8  F (36  C)  Heart Rate:  [61-95] 85  Resp:  [7-24] 19  BP: (102-179)/() 107/57  SpO2:  [93 %-100 %] 95 %    Intake/Output Summary (Last 24 hours) at 07/27/18 0753  Last data filed at 07/27/18 0720   Gross per 24 hour   Intake             1684 ml   Output              780 ml   Net              904 ml       Gen: NAD. Resting comfortably in bed  Resp: Breathing comfortably on NC  RLE:  Dressing/ACE is c/d/i. Abduction pillow in place.  SILT in femoral, saphenous, sural,  deep peroneal, superficial peroneal, and tibial n dist.   Fires EHL/FHL/TA/Gastroc with 4/5 strength    DP pulses intact, 2+ and foot is wwp    Labs:    Recent Labs  Lab 07/26/18  0652 07/25/18  2130 07/25/18  1815   WBC 5.7 5.7 6.3   HGB 11.9 11.5* 12.0    351 371        Assessment & Plan:   Post-Op Plan:  Assessment/Plan: Elizabeth Taylor is a 78 year old female with right femoral neck fracture now s/p right hip hemiarthroplasty on 7/26 with Dr. Phan     Trauma Primary  Activity: Up with assist and assistive devices as needed until independent. Posterior hip precautions x 3 months.   Weight bearing status: WBAT  ABD Pillow at all times when at rest and in bed for 6 weeks  Antibiotics: Clinda x 24 hours. -- C diff positive; abx per primary team  Diet: adat. Bowel regimen. Anti-emetics PRN.   DVT prophylaxis: Lovenox 30mg Daily x 4 weeks  Wound Care: Aquacel x 7 days.    Pain management: Orals as needed. IV for breakthrough pain only.   X-rays: AP Pelvis and Lateral R hip in PACU. Completed  Physical Therapy: Mobilization, ROM, ADL's, Posterior hip precautions.    Occupational Therapy: ADL's  Labs: Trend Hgb on POD #1, 2.   Consults: PT, OT. Medicine, SW/CC  Follow-up: Clinic in 2 weeks for wound check     Disposition: Pending progress with therapies, pain control on orals, and medical stability, anticipate discharge to TCU/ARU on POD #2-3.        Rex Ballard MD   Orthopaedic Surgery Resident, PGY-4  Pager: [phone][MS1.1]       Revision History        User Key Date/Time User Provider Type Action    > MS1.1 7/27/2018  7:58 AM Rex Ballard MD Resident Sign                  Procedure Notes     No notes of this type exist for this encounter.         Progress Notes - Therapies (Notes from 07/27/18 through 07/30/18)      Progress Notes by Heather Otto OT at 7/28/2018 11:43 AM     Author:  Heather Otto OT Service:  (none) Author Type:  Occupational Therapist    Filed:   "7/28/2018 11:43 AM Date of Service:  7/28/2018 11:43 AM Creation Time:  7/28/2018 11:43 AM    Status:  Signed :  Heather Otto OT (Occupational Therapist)          07/28/18 0938   Quick Adds   Type of Visit Initial Occupational Therapy Evaluation   Living Environment   Lives With sibling(s)   Living Arrangements house   Number of Stairs to Enter Home 4   Number of Stairs Within Home 4   Living Environment Comment Per PT, patient had been staying with sister following a TCU stay, patient had stayed at home for 2 weeks following fx.   Self-Care   Usual Activity Tolerance moderate   Current Activity Tolerance fair   Equipment Currently Used at Home walker, rolling   Activity/Exercise/Self-Care Comment Patient had recently undergone TCU stay for chemo/radiation endurance and rib fractures, had DC'd to sister's house with independence with basic ADLs and ambulation with walker.  Patient felt \"lightning sharp\" pain in R  hip and fell at this point.  Prior to TCU stays/chemo (patient has difficulty with timeline) was independent with ADLs/mobility without AE.   Functional Level Prior   Ambulation 0-->independent   Transferring 0-->independent   Toileting 0-->independent   Bathing 0-->independent   Dressing 0-->independent   Eating 0-->independent   Communication 0-->understands/communicates without difficulty   Swallowing 0-->swallows foods/liquids without difficulty   Fall history within last six months yes   Prior Functional Level Comment Patient had recently undergone TCU stay for chemo/radiation endurance and rib fractures, had DC'd to sister's house with independence with basic ADLs and ambulation with walker.  Patient felt \"lightning sharp\" pain in R  hip and fell at this point.  Prior to TCU stays/chemo (patient has difficulty with timeline) was independent with ADLs/mobility without AE.   General Information   Onset of Illness/Injury or Date of Surgery - Date 07/26/18   Referring Physician Dr. Phan "   Additional Occupational Profile Info/Pertinent History of Current Problem Elizabeth Taylor is a 78 year old female with right femoral neck fracture now s/p right hip hemiarthroplasty on 7/26 ; hx also includes Stage III anal cancer, R side rib fx   Precautions/Limitations right hip precautions   Weight-Bearing Status - RLE weight-bearing as tolerated   Cognitive Status Examination   Level of Consciousness alert   Able to Follow Commands WNL/WFL   Cognitive Comment No cognitive concerns   Sensory Examination   Sensory Comments No N/T noted   Pain Assessment   Patient Currently in Pain Yes, see Vital Sign flowsheet   Mobility   Bed Mobility Bed mobility skill: Sit to supine;Bed mobility skill: Supine to sit   Bed Mobility Skill: Sit to Supine   Level of [place]: Sit/Supine maximum assist (25% patients effort)   Physical Assist/Nonphysical Assist: Sit/Supine 2 persons   Bed Mobility Skill: Supine to Sit   Level of [place]: Supine/Sit maximum assist (25% patients effort)   Physical Assist/Nonphysical Assist: Supine/Sit 2 persons   Transfer Skill: Bed to Chair/Chair to Bed   Level of [place]: Bed to Chair minimum assist (75% patients effort)   Physical Assist/Nonphysical Assist: Bed to Chair 2 persons   Weight-Bearing Restrictions weight-bearing as tolerated   Assistive Device - Transfer Skill Bed to Chair Chair to Bed Rehab Eval standard walker   Transfer Skill: Sit to Stand   Level of [place]: Sit/Stand minimum assist (75% patients effort)   Physical Assist/Nonphysical Assist: Sit/Stand 2 persons   Transfer Skill: Sit to Stand weight-bearing as tolerated   Assistive Device for Transfer: Sit/Stand standard walker   Transfer Skill: Toilet Transfer   Level of [place]: Toilet unable to perform   Bathing   Level of [place] maximum assist (25% patients effort)   Upper Body Dressing   Level of [place]: Dress Upper Body other (see comments)  (not assessed)   Lower Body Dressing   Level  "of [place]: Dress Lower Body maximum assist (25% patients effort)   Toileting   Level of [place]: Toilet dependent (less than 25% patients effort)   Grooming   Level of [place]: Grooming stand-by assist   Eating/Self Feeding   Level of [place]: Eating independent   Activities of Daily Living Analysis   Impairments Contributing to Impaired Activities of Daily Living fear and anxiety;pain;post surgical precautions;ROM decreased;strength decreased   General Therapy Interventions   Planned Therapy Interventions ADL retraining;strengthening   Clinical Impression   Criteria for Skilled Therapeutic Interventions Met yes, treatment indicated   OT Diagnosis impaired self-cares/mobility   Influenced by the following impairments pain; decreased ROM   Assessment of Occupational Performance 1-3 Performance Deficits   Identified Performance Deficits dressing, bathing, toileting   Clinical Decision Making (Complexity) Low complexity   Therapy Frequency other (see comments)  (6x/week)   Predicted Duration of Therapy Intervention (days/wks) 4-5 days   Anticipated Discharge Disposition Transitional Care Facility   Risks and Benefits of Treatment have been explained. Yes   Patient, Family & other staff in agreement with plan of care Yes   [facility] \"6 Clicks\"   2016, Trustees of [facility], under license to [employer].  All rights reserved.   6 Clicks Short Forms Daily Activity Inpatient Short Form   [facility]  \"6 Clicks\" Daily Activity Inpatient Short Form   1. Putting on and taking off regular lower body clothing? 2 - A Lot   2. Bathing (including washing, rinsing, drying)? 2 - A Lot   3. Toileting, which includes using toilet, bedpan or urinal? 1 - Total   4. Putting on and taking off regular upper body clothing? 3 - A Little   5. Taking care of personal grooming such as brushing teeth? 3 - A Little   6. Eating meals? 4 - None   Daily Activity Raw Score (Score out of " 24.Lower scores equate to lower levels of function) 15   Total Evaluation Time   Total Evaluation Time (Minutes) 8[MW1.1]        Revision History        User Key Date/Time User Provider Type Action    > MW1.1 7/28/2018 11:43 AM Heather Otto OT Occupational Therapist Sign

## 2018-07-25 NOTE — TELEPHONE ENCOUNTER
Received fax from [facility].    Per Medicare Guidelines, progress notes are required to have the need for home care, the services provided and how those services will benefit the pt. Please added your notes to reflect the need for home care to include a homebound status.    Placed forms in 's signature folder. This is urgent.       Vangie Beltre MA

## 2018-07-26 ENCOUNTER — ANESTHESIA EVENT (OUTPATIENT)
Dept: SURGERY | Facility: CLINIC | Age: 78
DRG: 470 | End: 2018-07-26
Payer: MEDICARE

## 2018-07-26 ENCOUNTER — ANESTHESIA (OUTPATIENT)
Dept: SURGERY | Facility: CLINIC | Age: 78
DRG: 470 | End: 2018-07-26
Payer: MEDICARE

## 2018-07-26 ENCOUNTER — APPOINTMENT (OUTPATIENT)
Dept: GENERAL RADIOLOGY | Facility: CLINIC | Age: 78
DRG: 470 | End: 2018-07-26
Attending: SURGERY
Payer: MEDICARE

## 2018-07-26 ENCOUNTER — APPOINTMENT (OUTPATIENT)
Dept: GENERAL RADIOLOGY | Facility: CLINIC | Age: 78
DRG: 470 | End: 2018-07-26
Payer: MEDICARE

## 2018-07-26 LAB
ALBUMIN UR-MCNC: NEGATIVE MG/DL
AMORPH CRY #/AREA URNS HPF: ABNORMAL /HPF
ANION GAP SERPL CALCULATED.3IONS-SCNC: 8 MMOL/L (ref 3–14)
APPEARANCE UR: ABNORMAL
BILIRUB UR QL STRIP: NEGATIVE
BUN SERPL-MCNC: 17 MG/DL (ref 7–30)
CALCIUM SERPL-MCNC: 8.9 MG/DL (ref 8.5–10.1)
CHLORIDE SERPL-SCNC: 104 MMOL/L (ref 94–109)
CO2 SERPL-SCNC: 26 MMOL/L (ref 20–32)
COLOR UR AUTO: ABNORMAL
CREAT SERPL-MCNC: 0.47 MG/DL (ref 0.52–1.04)
ERYTHROCYTE [DISTWIDTH] IN BLOOD BY AUTOMATED COUNT: 14 % (ref 10–15)
GFR SERPL CREATININE-BSD FRML MDRD: >90 ML/MIN/1.7M2
GLUCOSE SERPL-MCNC: 87 MG/DL (ref 70–99)
GLUCOSE UR STRIP-MCNC: NEGATIVE MG/DL
GRAM STN SPEC: NORMAL
GRAM STN SPEC: NORMAL
HCT VFR BLD AUTO: 36.8 % (ref 35–47)
HGB BLD-MCNC: 11.9 G/DL (ref 11.7–15.7)
HGB UR QL STRIP: NEGATIVE
KETONES UR STRIP-MCNC: NEGATIVE MG/DL
LEUKOCYTE ESTERASE UR QL STRIP: NEGATIVE
MAGNESIUM SERPL-MCNC: 2 MG/DL (ref 1.6–2.3)
MCH RBC QN AUTO: 28.1 PG (ref 26.5–33)
MCHC RBC AUTO-ENTMCNC: 32.3 G/DL (ref 31.5–36.5)
MCV RBC AUTO: 87 FL (ref 78–100)
NITRATE UR QL: NEGATIVE
PH UR STRIP: 7.5 PH (ref 5–7)
PHOSPHATE SERPL-MCNC: 3.1 MG/DL (ref 2.5–4.5)
PLATELET # BLD AUTO: 348 10E9/L (ref 150–450)
POTASSIUM SERPL-SCNC: 4.1 MMOL/L (ref 3.4–5.3)
RBC # BLD AUTO: 4.23 10E12/L (ref 3.8–5.2)
RBC #/AREA URNS AUTO: <1 /HPF (ref 0–2)
SODIUM SERPL-SCNC: 138 MMOL/L (ref 133–144)
SOURCE: ABNORMAL
SP GR UR STRIP: 1.01 (ref 1–1.03)
SPECIMEN SOURCE: NORMAL
UROBILINOGEN UR STRIP-MCNC: NORMAL MG/DL (ref 0–2)
WBC # BLD AUTO: 5.7 10E9/L (ref 4–11)
WBC #/AREA URNS AUTO: 2 /HPF (ref 0–5)

## 2018-07-26 PROCEDURE — 37000009 ZZH ANESTHESIA TECHNICAL FEE, EACH ADDTL 15 MIN: Performed by: ORTHOPAEDIC SURGERY

## 2018-07-26 PROCEDURE — 36415 COLL VENOUS BLD VENIPUNCTURE: CPT | Performed by: SURGERY

## 2018-07-26 PROCEDURE — 87102 FUNGUS ISOLATION CULTURE: CPT | Performed by: ORTHOPAEDIC SURGERY

## 2018-07-26 PROCEDURE — 25000125 ZZHC RX 250: Performed by: NURSE ANESTHETIST, CERTIFIED REGISTERED

## 2018-07-26 PROCEDURE — 37000008 ZZH ANESTHESIA TECHNICAL FEE, 1ST 30 MIN: Performed by: ORTHOPAEDIC SURGERY

## 2018-07-26 PROCEDURE — 0SRR0J9 REPLACEMENT OF RIGHT HIP JOINT, FEMORAL SURFACE WITH SYNTHETIC SUBSTITUTE, CEMENTED, OPEN APPROACH: ICD-10-PCS | Performed by: ORTHOPAEDIC SURGERY

## 2018-07-26 PROCEDURE — 25000125 ZZHC RX 250: Performed by: STUDENT IN AN ORGANIZED HEALTH CARE EDUCATION/TRAINING PROGRAM

## 2018-07-26 PROCEDURE — 37000008 ZZH ANESTHESIA TECHNICAL FEE, 1ST 30 MIN

## 2018-07-26 PROCEDURE — 25000128 H RX IP 250 OP 636: Performed by: NURSE ANESTHETIST, CERTIFIED REGISTERED

## 2018-07-26 PROCEDURE — 36000066 ZZH SURGERY LEVEL 4 W FLUORO 1ST 30 MIN - UMMC: Performed by: ORTHOPAEDIC SURGERY

## 2018-07-26 PROCEDURE — 27210794 ZZH OR GENERAL SUPPLY STERILE: Performed by: ORTHOPAEDIC SURGERY

## 2018-07-26 PROCEDURE — 83735 ASSAY OF MAGNESIUM: CPT | Performed by: SURGERY

## 2018-07-26 PROCEDURE — 81001 URINALYSIS AUTO W/SCOPE: CPT | Performed by: PHYSICIAN ASSISTANT

## 2018-07-26 PROCEDURE — 80048 BASIC METABOLIC PNL TOTAL CA: CPT | Performed by: SURGERY

## 2018-07-26 PROCEDURE — 37000009 ZZH ANESTHESIA TECHNICAL FEE, EACH ADDTL 15 MIN

## 2018-07-26 PROCEDURE — 25000132 ZZH RX MED GY IP 250 OP 250 PS 637: Mod: GY | Performed by: SURGERY

## 2018-07-26 PROCEDURE — 40000170 ZZH STATISTIC PRE-PROCEDURE ASSESSMENT II: Performed by: ORTHOPAEDIC SURGERY

## 2018-07-26 PROCEDURE — A9270 NON-COVERED ITEM OR SERVICE: HCPCS | Mod: GY | Performed by: PHYSICIAN ASSISTANT

## 2018-07-26 PROCEDURE — 87205 SMEAR GRAM STAIN: CPT | Performed by: ORTHOPAEDIC SURGERY

## 2018-07-26 PROCEDURE — A9270 NON-COVERED ITEM OR SERVICE: HCPCS | Mod: GY | Performed by: SURGERY

## 2018-07-26 PROCEDURE — 25000128 H RX IP 250 OP 636: Performed by: ORTHOPAEDIC SURGERY

## 2018-07-26 PROCEDURE — 87075 CULTR BACTERIA EXCEPT BLOOD: CPT | Performed by: ORTHOPAEDIC SURGERY

## 2018-07-26 PROCEDURE — 84100 ASSAY OF PHOSPHORUS: CPT | Performed by: SURGERY

## 2018-07-26 PROCEDURE — 27110038 ZZH RX 271: Performed by: STUDENT IN AN ORGANIZED HEALTH CARE EDUCATION/TRAINING PROGRAM

## 2018-07-26 PROCEDURE — 27810169 ZZH OR IMPLANT GENERAL: Performed by: ORTHOPAEDIC SURGERY

## 2018-07-26 PROCEDURE — 40000986 XR PELVIS AND HIP RIGHT 2 VIEWS

## 2018-07-26 PROCEDURE — 40000985 XR PELVIS PORT 1/2 VW: Mod: TC

## 2018-07-26 PROCEDURE — 36000064 ZZH SURGERY LEVEL 4 EA 15 ADDTL MIN - UMMC: Performed by: ORTHOPAEDIC SURGERY

## 2018-07-26 PROCEDURE — 99233 SBSQ HOSP IP/OBS HIGH 50: CPT | Performed by: PHYSICIAN ASSISTANT

## 2018-07-26 PROCEDURE — 25000128 H RX IP 250 OP 636: Performed by: SURGERY

## 2018-07-26 PROCEDURE — 87070 CULTURE OTHR SPECIMN AEROBIC: CPT | Performed by: ORTHOPAEDIC SURGERY

## 2018-07-26 PROCEDURE — 25000125 ZZHC RX 250: Performed by: ANESTHESIOLOGY

## 2018-07-26 PROCEDURE — 85027 COMPLETE CBC AUTOMATED: CPT | Performed by: SURGERY

## 2018-07-26 PROCEDURE — 25000128 H RX IP 250 OP 636: Performed by: STUDENT IN AN ORGANIZED HEALTH CARE EDUCATION/TRAINING PROGRAM

## 2018-07-26 PROCEDURE — 12000008 ZZH R&B INTERMEDIATE UMMC

## 2018-07-26 PROCEDURE — 25000132 ZZH RX MED GY IP 250 OP 250 PS 637: Mod: GY | Performed by: PHYSICIAN ASSISTANT

## 2018-07-26 PROCEDURE — 40000171 ZZH STATISTIC PRE-PROCEDURE ASSESSMENT III

## 2018-07-26 PROCEDURE — 71000014 ZZH RECOVERY PHASE 1 LEVEL 2 FIRST HR: Performed by: ORTHOPAEDIC SURGERY

## 2018-07-26 PROCEDURE — C1776 JOINT DEVICE (IMPLANTABLE): HCPCS | Performed by: ORTHOPAEDIC SURGERY

## 2018-07-26 DEVICE — IMP SPACER OSTEONICS DISTAL CEMENT 11MM 1067-0011: Type: IMPLANTABLE DEVICE | Site: HIP | Status: FUNCTIONAL

## 2018-07-26 DEVICE — IMP HEAD STRK FEMORAL UHR BIPOLAR 28X47MM UH1-47-28: Type: IMPLANTABLE DEVICE | Site: HIP | Status: FUNCTIONAL

## 2018-07-26 DEVICE — BONE CEMENT RESTRICTOR BUCK FEMORAL 18.5MM 129418: Type: IMPLANTABLE DEVICE | Site: HIP | Status: FUNCTIONAL

## 2018-07-26 DEVICE — BONE CEMENT SIMPLEX W/TOBRAMYCIN 6197-9-001: Type: IMPLANTABLE DEVICE | Site: HIP | Status: FUNCTIONAL

## 2018-07-26 DEVICE — IMP STEM COMP STRK HIP 127DEG #6 6076-0625A: Type: IMPLANTABLE DEVICE | Site: HIP | Status: FUNCTIONAL

## 2018-07-26 DEVICE — IMPLANTABLE DEVICE: Type: IMPLANTABLE DEVICE | Site: HIP | Status: FUNCTIONAL

## 2018-07-26 RX ORDER — CLINDAMYCIN PHOSPHATE 900 MG/50ML
900 INJECTION, SOLUTION INTRAVENOUS EVERY 8 HOURS
Status: COMPLETED | OUTPATIENT
Start: 2018-07-27 | End: 2018-07-27

## 2018-07-26 RX ORDER — ONDANSETRON 2 MG/ML
4 INJECTION INTRAMUSCULAR; INTRAVENOUS EVERY 30 MIN PRN
Status: DISCONTINUED | OUTPATIENT
Start: 2018-07-26 | End: 2018-07-26 | Stop reason: HOSPADM

## 2018-07-26 RX ORDER — ONDANSETRON 4 MG/1
4 TABLET, ORALLY DISINTEGRATING ORAL EVERY 30 MIN PRN
Status: DISCONTINUED | OUTPATIENT
Start: 2018-07-26 | End: 2018-07-26 | Stop reason: HOSPADM

## 2018-07-26 RX ORDER — EPINEPHRINE 1 MG/ML
INJECTION, SOLUTION, CONCENTRATE INTRAVENOUS PRN
Status: DISCONTINUED | OUTPATIENT
Start: 2018-07-26 | End: 2018-07-26 | Stop reason: HOSPADM

## 2018-07-26 RX ORDER — NALOXONE HYDROCHLORIDE 0.4 MG/ML
.1-.4 INJECTION, SOLUTION INTRAMUSCULAR; INTRAVENOUS; SUBCUTANEOUS
Status: ACTIVE | OUTPATIENT
Start: 2018-07-26 | End: 2018-07-27

## 2018-07-26 RX ORDER — VIT B COMP NO.3/FOLIC/C/BIOTIN 1 MG-60 MG
1 TABLET ORAL DAILY
Status: DISCONTINUED | OUTPATIENT
Start: 2018-07-26 | End: 2018-07-30 | Stop reason: HOSPADM

## 2018-07-26 RX ORDER — SODIUM CHLORIDE, SODIUM LACTATE, POTASSIUM CHLORIDE, CALCIUM CHLORIDE 600; 310; 30; 20 MG/100ML; MG/100ML; MG/100ML; MG/100ML
INJECTION, SOLUTION INTRAVENOUS CONTINUOUS
Status: DISCONTINUED | OUTPATIENT
Start: 2018-07-26 | End: 2018-07-26 | Stop reason: HOSPADM

## 2018-07-26 RX ORDER — BUPIVACAINE HYDROCHLORIDE 2.5 MG/ML
INJECTION, SOLUTION EPIDURAL; INFILTRATION; INTRACAUDAL PRN
Status: DISCONTINUED | OUTPATIENT
Start: 2018-07-26 | End: 2018-07-26

## 2018-07-26 RX ORDER — ONDANSETRON 2 MG/ML
4 INJECTION INTRAMUSCULAR; INTRAVENOUS EVERY 6 HOURS PRN
Status: DISCONTINUED | OUTPATIENT
Start: 2018-07-26 | End: 2018-07-27

## 2018-07-26 RX ORDER — LIDOCAINE 40 MG/G
CREAM TOPICAL
Status: DISCONTINUED | OUTPATIENT
Start: 2018-07-26 | End: 2018-07-30 | Stop reason: HOSPADM

## 2018-07-26 RX ORDER — ONDANSETRON 4 MG/1
4 TABLET, ORALLY DISINTEGRATING ORAL EVERY 6 HOURS PRN
Status: DISCONTINUED | OUTPATIENT
Start: 2018-07-26 | End: 2018-07-27

## 2018-07-26 RX ORDER — FENTANYL CITRATE 50 UG/ML
INJECTION, SOLUTION INTRAMUSCULAR; INTRAVENOUS PRN
Status: DISCONTINUED | OUTPATIENT
Start: 2018-07-26 | End: 2018-07-26

## 2018-07-26 RX ORDER — CLINDAMYCIN PHOSPHATE 900 MG/50ML
900 INJECTION, SOLUTION INTRAVENOUS
Status: COMPLETED | OUTPATIENT
Start: 2018-07-26 | End: 2018-07-26

## 2018-07-26 RX ORDER — SODIUM CHLORIDE, SODIUM LACTATE, POTASSIUM CHLORIDE, CALCIUM CHLORIDE 600; 310; 30; 20 MG/100ML; MG/100ML; MG/100ML; MG/100ML
INJECTION, SOLUTION INTRAVENOUS CONTINUOUS PRN
Status: DISCONTINUED | OUTPATIENT
Start: 2018-07-26 | End: 2018-07-26

## 2018-07-26 RX ORDER — LIDOCAINE HYDROCHLORIDE 20 MG/ML
INJECTION, SOLUTION INFILTRATION; PERINEURAL PRN
Status: DISCONTINUED | OUTPATIENT
Start: 2018-07-26 | End: 2018-07-26

## 2018-07-26 RX ORDER — HYDROMORPHONE HYDROCHLORIDE 1 MG/ML
.3-.5 INJECTION, SOLUTION INTRAMUSCULAR; INTRAVENOUS; SUBCUTANEOUS EVERY 5 MIN PRN
Status: DISCONTINUED | OUTPATIENT
Start: 2018-07-26 | End: 2018-07-26 | Stop reason: HOSPADM

## 2018-07-26 RX ORDER — NALOXONE HYDROCHLORIDE 0.4 MG/ML
.1-.4 INJECTION, SOLUTION INTRAMUSCULAR; INTRAVENOUS; SUBCUTANEOUS
Status: DISCONTINUED | OUTPATIENT
Start: 2018-07-26 | End: 2018-07-26 | Stop reason: HOSPADM

## 2018-07-26 RX ORDER — EPHEDRINE SULFATE 50 MG/ML
INJECTION, SOLUTION INTRAMUSCULAR; INTRAVENOUS; SUBCUTANEOUS PRN
Status: DISCONTINUED | OUTPATIENT
Start: 2018-07-26 | End: 2018-07-26

## 2018-07-26 RX ORDER — PROPOFOL 10 MG/ML
INJECTION, EMULSION INTRAVENOUS CONTINUOUS PRN
Status: DISCONTINUED | OUTPATIENT
Start: 2018-07-26 | End: 2018-07-26

## 2018-07-26 RX ORDER — FLUMAZENIL 0.1 MG/ML
0.2 INJECTION, SOLUTION INTRAVENOUS
Status: DISCONTINUED | OUTPATIENT
Start: 2018-07-26 | End: 2018-07-26 | Stop reason: HOSPADM

## 2018-07-26 RX ORDER — FENTANYL CITRATE 50 UG/ML
25-50 INJECTION, SOLUTION INTRAMUSCULAR; INTRAVENOUS
Status: DISCONTINUED | OUTPATIENT
Start: 2018-07-26 | End: 2018-07-26 | Stop reason: HOSPADM

## 2018-07-26 RX ORDER — LIDOCAINE 40 MG/G
CREAM TOPICAL
Status: DISCONTINUED | OUTPATIENT
Start: 2018-07-26 | End: 2018-07-26 | Stop reason: HOSPADM

## 2018-07-26 RX ORDER — CLINDAMYCIN PHOSPHATE 900 MG/50ML
900 INJECTION, SOLUTION INTRAVENOUS SEE ADMIN INSTRUCTIONS
Status: DISCONTINUED | OUTPATIENT
Start: 2018-07-26 | End: 2018-07-26 | Stop reason: HOSPADM

## 2018-07-26 RX ORDER — ONDANSETRON 2 MG/ML
INJECTION INTRAMUSCULAR; INTRAVENOUS PRN
Status: DISCONTINUED | OUTPATIENT
Start: 2018-07-26 | End: 2018-07-26

## 2018-07-26 RX ORDER — BUPIVACAINE HYDROCHLORIDE 7.5 MG/ML
INJECTION, SOLUTION INTRASPINAL PRN
Status: DISCONTINUED | OUTPATIENT
Start: 2018-07-26 | End: 2018-07-26

## 2018-07-26 RX ORDER — ACETAMINOPHEN 325 MG/1
975 TABLET ORAL ONCE
Status: DISCONTINUED | OUTPATIENT
Start: 2018-07-26 | End: 2018-07-26 | Stop reason: HOSPADM

## 2018-07-26 RX ORDER — NALOXONE HYDROCHLORIDE 0.4 MG/ML
.1-.4 INJECTION, SOLUTION INTRAMUSCULAR; INTRAVENOUS; SUBCUTANEOUS
Status: DISCONTINUED | OUTPATIENT
Start: 2018-07-26 | End: 2018-07-27

## 2018-07-26 RX ADMIN — FENTANYL CITRATE 25 MCG: 50 INJECTION, SOLUTION INTRAMUSCULAR; INTRAVENOUS at 09:32

## 2018-07-26 RX ADMIN — ACETAMINOPHEN 1000 MG: 500 TABLET, FILM COATED ORAL at 00:31

## 2018-07-26 RX ADMIN — Medication 0.2 MG: at 06:29

## 2018-07-26 RX ADMIN — PHENYLEPHRINE HYDROCHLORIDE 100 MCG: 10 INJECTION, SOLUTION INTRAMUSCULAR; INTRAVENOUS; SUBCUTANEOUS at 18:25

## 2018-07-26 RX ADMIN — ONDANSETRON 4 MG: 2 INJECTION INTRAMUSCULAR; INTRAVENOUS at 17:25

## 2018-07-26 RX ADMIN — SENNOSIDES AND DOCUSATE SODIUM 2 TABLET: 8.6; 5 TABLET ORAL at 08:38

## 2018-07-26 RX ADMIN — FENTANYL CITRATE 25 MCG: 50 INJECTION, SOLUTION INTRAMUSCULAR; INTRAVENOUS at 09:25

## 2018-07-26 RX ADMIN — SODIUM CHLORIDE, POTASSIUM CHLORIDE, SODIUM LACTATE AND CALCIUM CHLORIDE: 600; 310; 30; 20 INJECTION, SOLUTION INTRAVENOUS at 17:10

## 2018-07-26 RX ADMIN — Medication 0.2 MG: at 04:19

## 2018-07-26 RX ADMIN — Medication 1 TABLET: at 08:38

## 2018-07-26 RX ADMIN — MIDAZOLAM 1 MG: 1 INJECTION INTRAMUSCULAR; INTRAVENOUS at 17:14

## 2018-07-26 RX ADMIN — BUPIVACAINE HYDROCHLORIDE IN DEXTROSE 1.6 ML: 7.5 INJECTION, SOLUTION SUBARACHNOID at 17:30

## 2018-07-26 RX ADMIN — Medication 8 ML/HR: at 12:25

## 2018-07-26 RX ADMIN — Medication 5 MG: at 18:28

## 2018-07-26 RX ADMIN — ACETAMINOPHEN 1000 MG: 500 TABLET, FILM COATED ORAL at 08:38

## 2018-07-26 RX ADMIN — B-COMPLEX W/ C & FOLIC ACID TAB 1 MG 1 TABLET: 1 TAB at 14:33

## 2018-07-26 RX ADMIN — SODIUM CHLORIDE, POTASSIUM CHLORIDE, SODIUM LACTATE AND CALCIUM CHLORIDE 1000 ML: 600; 310; 30; 20 INJECTION, SOLUTION INTRAVENOUS at 00:32

## 2018-07-26 RX ADMIN — PROPOFOL 50 MCG/KG/MIN: 10 INJECTION, EMULSION INTRAVENOUS at 17:25

## 2018-07-26 RX ADMIN — Medication: at 12:26

## 2018-07-26 RX ADMIN — LIDOCAINE 1 PATCH: 560 PATCH PERCUTANEOUS; TOPICAL; TRANSDERMAL at 00:30

## 2018-07-26 RX ADMIN — LIDOCAINE HYDROCHLORIDE 30 MG: 20 INJECTION, SOLUTION INFILTRATION; PERINEURAL at 17:25

## 2018-07-26 RX ADMIN — LIDOCAINE 1 PATCH: 560 PATCH PERCUTANEOUS; TOPICAL; TRANSDERMAL at 23:44

## 2018-07-26 RX ADMIN — THERA TABS 1 TABLET: TAB at 08:38

## 2018-07-26 RX ADMIN — BUPIVACAINE HYDROCHLORIDE 20 ML: 2.5 INJECTION, SOLUTION EPIDURAL; INFILTRATION; INTRACAUDAL at 09:50

## 2018-07-26 RX ADMIN — CLINDAMYCIN PHOSPHATE 900 MG: 18 INJECTION, SOLUTION INTRAVENOUS at 18:05

## 2018-07-26 RX ADMIN — PHENYLEPHRINE HYDROCHLORIDE 200 MCG: 10 INJECTION, SOLUTION INTRAMUSCULAR; INTRAVENOUS; SUBCUTANEOUS at 17:57

## 2018-07-26 RX ADMIN — PHENYLEPHRINE HYDROCHLORIDE 200 MCG: 10 INJECTION, SOLUTION INTRAMUSCULAR; INTRAVENOUS; SUBCUTANEOUS at 18:14

## 2018-07-26 RX ADMIN — ACETAMINOPHEN 1000 MG: 500 TABLET, FILM COATED ORAL at 23:45

## 2018-07-26 RX ADMIN — FENTANYL CITRATE 25 MCG: 50 INJECTION, SOLUTION INTRAMUSCULAR; INTRAVENOUS at 20:26

## 2018-07-26 RX ADMIN — PHENYLEPHRINE HYDROCHLORIDE 200 MCG: 10 INJECTION, SOLUTION INTRAMUSCULAR; INTRAVENOUS; SUBCUTANEOUS at 18:06

## 2018-07-26 RX ADMIN — Medication: at 14:32

## 2018-07-26 RX ADMIN — FENTANYL CITRATE 25 MCG: 50 INJECTION, SOLUTION INTRAMUSCULAR; INTRAVENOUS at 19:59

## 2018-07-26 RX ADMIN — FENTANYL CITRATE 25 MCG: 50 INJECTION, SOLUTION INTRAMUSCULAR; INTRAVENOUS at 09:37

## 2018-07-26 ASSESSMENT — ENCOUNTER SYMPTOMS
CARDIOVASCULAR NEGATIVE: 1
ENDOCRINE NEGATIVE: 1
EYES NEGATIVE: 1
RESPIRATORY NEGATIVE: 1
CONSTITUTIONAL NEGATIVE: 1
HEMATOLOGIC/LYMPHATIC NEGATIVE: 1
PSYCHIATRIC NEGATIVE: 1
NEUROLOGICAL NEGATIVE: 1
ALLERGIC/IMMUNOLOGIC NEGATIVE: 1

## 2018-07-26 ASSESSMENT — VISUAL ACUITY
OU: GLASSES

## 2018-07-26 ASSESSMENT — ACTIVITIES OF DAILY LIVING (ADL)
ADLS_ACUITY_SCORE: 12

## 2018-07-26 NOTE — ANESTHESIA PROCEDURE NOTES
Spinal/LP Procedure Note    Spinal Block  Staff:     Anesthesiologist:  SKYLER KIMBALL  Location: OR  Procedure Start/Stop Times:     patient identified, IV checked, site marked, risks and benefits discussed, informed consent, monitors and equipment checked, pre-op evaluation, at physician/surgeon's request and post-op pain management      Correct Patient: Yes      Correct Position: Yes      Correct Site: Yes      Correct Procedure: Yes      Correct Laterality:  Yes    Site Marked:  Yes  Procedure:     Procedure:  Intrathecal    ASA:  3    Position:  Right lateral decubitus    Sterile Prep: chloraprep      Insertion site:  L4-5    Approach:  Midline    Needle Type:  Maximino    Needle gauge (G):  25    Local Skin Infiltration:  1% lidocaine    amount (ml):  3    Needle Length (in):  3.5    Introducer used: Yes      Introducer gauge:  20 G    Attempts:  2    Redirects:  3    CSF:  Clear    Paresthesias:  No

## 2018-07-26 NOTE — PLAN OF CARE
Problem: Patient Care Overview  Goal: Plan of Care/Patient Progress Review  Outcome: No Change  Vital signs:  Temp: 98.5  F (36.9  C) Temp src: Oral BP: 133/79 Pulse: 76 Heart Rate: 90 Resp: 16 SpO2: 95 % O2 Device: None (Room air)   Pt arrived from ED around 2300 d/t R hip fracture. A&Ox3, forgetful at times. LS clear. Pain managed with prn IV dilaudid x2, lidocaine patch x1, & scheduled tylenol x1. CMS & neuro intact. R LE swelling +2. NPO after midnight. MIVF infusing at 100 ml/hr. Voiding adequately. +BS, +flatus. LBM 7/25. Repositioned Q 2hrs. Plan for possible OR today. Continue POC.

## 2018-07-26 NOTE — OR NURSING
Pt had pain block catheter placed-fascia iliaca-for pain control . Done in advance. Surgery is scheduled for later today.

## 2018-07-26 NOTE — PLAN OF CARE
Problem: Patient Care Overview  Goal: Plan of Care/Patient Progress Review  PT 7B: Cancel - PT orders acknowledged and appreciated. Pt anticipating OR today for femoral neck fracture. Will monitor status and initiate as indicated. Thank you for your referral.

## 2018-07-26 NOTE — PROGRESS NOTES
[facility], [place]  Trauma Education Note    Date of Service: 07/26/2018     Trauma Mechanism: Mechanical Fall  Known Injuries:  1. R femoral neck fx    Assessment:    Education Needs   Pulmonary Hygiene    Primary Learner: Patient    Barriers: No barrier    Plan:    Education provided: Pulmonary Hygiene - Modeled usage of incentive spirometer for patient; patient demonstrated appropriate usage with a max volume of approx 900ml.  Patient verbalized understanding IS frequency q 1 hour to help with pulmonary hygiene related to immobility.    Handouts provided: Opioids; fall prevention  Follow up Education Needs: Ongoing pulmonary hygiene reinforcement per primary RN(s)    Tom Todd

## 2018-07-26 NOTE — ANESTHESIA PROCEDURE NOTES
Peripheral Nerve Block Procedure Note    Staff:     Anesthesiologist:  ASA DOHERTY    Resident/CRNA:  MAUDE GRIMES    Block performed by resident/CRNA in the presence of a teaching physician    Location: Pre-op  Procedure Start/Stop TImes:      7/26/2018 9:25 AM     7/26/2018 10:00 AM    patient identified, IV checked, site marked, risks and benefits discussed, informed consent, monitors and equipment checked, pre-op evaluation, at physician/surgeon's request and post-op pain management      Correct Patient: Yes      Correct Position: Yes      Correct Site: Yes      Correct Procedure: Yes      Correct Laterality:  Yes    Site Marked:  Yes  Procedure details:     Procedure:  Fascia iliaca    ASA:  3    Laterality:  Right    Position:  Supine    Sterile Prep: chloraprep, patient draped, mask and sterile gloves      Local skin infiltration:  1% lidocaine    amount (mL):  3    Needle:  Touhy needle    Needle gauge:  17    Needle length (inches):  3.1    Catheter gauge:  19    Catheter threaded easily: Yes      Threaded to cm at skin:  10    Ultrasound: Yes      Ultrasound used to identify targeted nerve, plexus, or vascular structure and placed a needle adjacent to it      Permanent Image entered into patiient's record      Abnormal pain on injection: No      Blood Aspirated: No      Paresthesias:  No    Bleeding at site: No      Test dose (mL):  3    Test dose local:   Lidocaine 1.5% w/ 1:200,000 epinephrine    Test dose time:  09:50    Test dose negative for signs of intravascular injection: Yes      Bolus via:  Catheter    Infusion Method:  Continuous Infusion    Blood aspirated via catheter: No      Secured:  Dermabond and Tegaderm    Complications:  None  Assessment/Narrative:     Injection made incrementally with aspirations every (mL):  5       -RIGHT Fascia Iliaca Catheter Placed in PreOp in anticipation for surgery later in day  -Bolused with 20ml of 0.25% Bupivacaine via Catheter post  placement

## 2018-07-26 NOTE — ANESTHESIA PREPROCEDURE EVALUATION
Anesthesia Evaluation     . Pt has had prior anesthetic.            ROS/MED HX    ENT/Pulmonary: Comment:   -CXR with chronic rib fracture and pleural effusions  -History of previous fall in March with rib fractures and hemopneumothorax    Dry eye syndrome        Neurologic:  - neg neurologic ROS     Cardiovascular:  - neg cardiovascular ROS   (+) ----. : . . . :. . Previous cardiac testing date:results:date: results:ECG reviewed date:7/25/2018 results:  EKG: Marked sinus arrhythmia in 80's, likely respiratory variation. No ST/T changes. Noted peaked R waves in precordial leads suggestive of LVH. Normal QTc   date: results:          METS/Exercise Tolerance:     Hematologic: Comments:   -U/S lower extremity (7/26)  IMPRESSION:   1.  No evidence of deep venous thrombosis in either lower extremity.            Musculoskeletal:   (+) arthritis, , fracture lower extremity (Right Hip Fx): Hip, -       GI/Hepatic:  - neg GI/hepatic ROS       Renal/Genitourinary:  - ROS Renal section negative       Endo:     (+) thyroid problem (Thyroiditis, resolved)  Thyroid disease - Other, .      Psychiatric:  - neg psychiatric ROS       Infectious Disease:  - neg infectious disease ROS       Malignancy:   (+) Malignancy (SCC, anal cancer) History of Skin  Skin CA Remission status post Surgery and Chemo,         Other:                   Lab Results   Component Value Date    WBC 5.7 07/26/2018    HGB 11.9 07/26/2018    HCT 36.8 07/26/2018     07/26/2018     07/26/2018    POTASSIUM 4.1 07/26/2018    CHLORIDE 104 07/26/2018    CO2 26 07/26/2018    BUN 17 07/26/2018    CR 0.47 (L) 07/26/2018    GLC 87 07/26/2018    SED 25 12/05/2012    TROPI 0.025 03/19/2018    AST 18 07/25/2018    ALT 19 07/25/2018    ALKPHOS 126 07/25/2018    BILITOTAL 0.4 07/25/2018    RADHA 36 03/17/2018    INR 1.05 07/25/2018                      Anesthesia Plan      History & Physical Review  History and physical reviewed and following examination; no  interval change.    ASA Status:  3 .    NPO Status:  > 8 hours    Plan for Peripheral Nerve Block         -79 yo female with RIGHT hip fracture, likely sustained two weeks ago after mechanical fall at home. Planning for OR later today, with evaluation for placement of RIGHT Fascia Iliaca Catheter, consented 7/25/18. No contraindications to placement.    Yann Guevara MD  7/26/2018 8:37 AM  Anesthesia Resident  PGY4/CA-3        Postoperative Care  Postoperative pain management:  Peripheral nerve block (Continuous).      Consents  Anesthetic plan, risks, benefits and alternatives discussed with:  Patient.  Use of blood products discussed: No .   .

## 2018-07-26 NOTE — ANESTHESIA PREPROCEDURE EVALUATION
Anesthesia Evaluation    PAC Discussion and Assessment    ASA Classification:   Case is suitable for:   Anesthetic techniques and relevant risks discussed:   Invasive monitoring and risk discussed:   Types:   Possibility and Risk of blood transfusion discussed:   NPO instructions given:   Additional anesthetic preparation and risks discussed:   Needs early admission to pre-op area:   Other:     PAC Resident/NP Anesthesia Assessment:        Mid-Level Provider/Resident:   Date:   Time:     Attending Anesthesiologist Anesthesia Assessment:  Elizabeth Taylor is a 78 year old female with history of anal cancer who presents to the emergency department for the evaluation of hip pain.  The patient reports that she sustained a ground-level mechanical fall approximately 2 weeks ago while ambulating through her living room when her hip gave out, and she took the brunt of her fall on her right hip.  The patient reports significant and immediate pain following the fall, and she has been unable to ambulate without assistance since.  She denies any previous history of injury or surgery to the right hip, and no distal numbness/weakness.      Anesthesiologist: Thalia Rosario MD  Date:   Time:   Pass/Fail:   Disposition:     PAC Pharmacist Assessment:        Pharmacist:   Date:   Time:      Physical Exam  Normal systems: cardiovascular and pulmonary    Airway   Mallampati: II  TM distance: >3 FB  Neck ROM: full    Dental     Cardiovascular       Pulmonary           Anesthesia Plan      History & Physical Review  History and physical reviewed and following examination; no interval change.    ASA Status:  2 .    NPO Status:  > 6 hours    Plan for Spinal and MAC Maintenance will be TIVA.           Postoperative Care  Postoperative pain management:  IV analgesics and Neuraxial analgesia.      Consents  Anesthetic plan, risks, benefits and alternatives discussed with:  Patient.  Use of blood products discussed: Yes.   Use of blood  products discussed with Patient.  Consented to blood products.  .

## 2018-07-26 NOTE — PROGRESS NOTES
"Orthopaedic Daily Progress Note    S: No acute overnight events. Pain well controlled. Denies numbness or tingling.     O:  /69 (BP Location: Left arm)  Pulse 78  Temp 98.2  F (36.8  C) (Oral)  Resp 16  Ht 1.676 m (5' 6\")  Wt 47.9 kg (105 lb 9.6 oz)  SpO2 97%  BMI 17.04 kg/m2  Gen: A&Ox3, no acute distress  Resp: breathing equal and non-labored, no wheezing  Extremities: DP pulse palpable bilaterally.          --------------------------------   RLE: Hip flexed this AM.  TA/GSC/EHL/FHL. SILT DP/SP/tib/saph/sundar nerve distributions. Palpable DP pulse, foot wwp.       Recent Labs  Lab 18  0652 18  2130 18  1815   WBC 5.7 5.7 6.3   HGB 11.9 11.5* 12.0    351 371   CR 0.47*  --  0.62   INR  --   --  1.05     DVT US: negative for DVT.     IMPRESSION:   Elizabeth Taylor is a 78 year old female with a past medical history significant for anal cancer status post recent chemotherapy and radiation therapy status-post mechanical fall approximately 2 weeks ago with the followin.  Displaced right subcapital femoral neck fracture     RECOMMENDATIONS:   - Admit to trauma.  - Plan for OR: Tentative plan for OR this afternoon for right hip hemiarthroplasty                             -Consent: complete                             -Pre-op labs: complete                             -Medicine clearance: cleared by trauma  - Anticoagulation/DVT Prophylaxis: hold for OR today   - Antibiotics/Tetanus: Perioperative ordered.  - X-rays/Imaging: none further needed pre op  - Activity: Bedrest  - Weight bearing: Nonweightbearing right lower extremity  - Pain control: Per primary team, okay for local block per anesthesia  - Diet: NPO for OR today   - Follow-up: To be determined  - Disposition: Inpatient     Assessment and Plan discussed with Dr. Murillo, Orthopaedic Surgery staff.     Lennox Mcnally MD  Orthopaedic Surgery  PGY-4  Pager [phone]      "

## 2018-07-26 NOTE — PROGRESS NOTES
Emergency Social Work Services Note    Date of  Intervention: 07/25/18  Last Emergency Department Visit:  3/19/18 with hospital admission  Care Plan:  No  Collaborated with:  Pt; pt's sister and sister's .    Data:  Pt presented from home after fall and right hip pain.  SW consulted due to chart review that pt may need potential services due to fall, age and past medical history.    Intervention:  Chart reviewed.  SW met with pt, pt's sister Sahil and Sahil's ; introduced self and role.  SW attempted to begin psychosocial assessment however transport arrived to take pt to xray to interview stopped.    Per chart notes, pt has been living with her sister to recuperate from chemo and radiation.  Notes also indicate pt may be open to skilled home care services.  Pt had a previous TCU stay at the [facility] in April 2018.    Assessment:  Attempted SW assessment.  May need SW involvement again at time of d/c planning.    Plan:    Anticipated Disposition:  OR and hospitalization    Barriers to d/c plan:  Medical stability.    Follow Up:  SW will continue to follow for support and discharge planning.    SHAUN Gray  Social Work Services  Emergency Department   [phone] phone  [phone] pager  On-call pager, [phone], 1600 to midnight

## 2018-07-26 NOTE — PHARMACY-ADMISSION MEDICATION HISTORY
Admission medication history interview status for the 7/25/2018 admission is complete. See Epic admission navigator for allergy information, pharmacy, prior to admission medications and immunization status.     Medication history interview sources:  Patient and Care Everywhere    Changes made to PTA medication list (reason)  Added:   - None  Deleted:   - Multivitamin therapeutic tablets (per patient)  Changed:   - None    Additional medication history information (including reliability of information, actions taken by pharmacist):  - Patient was alert and willing to answer questions during medication interview.  Patient was a moderate historian regarding her medication routine.  She has some difficulty remembering the names of the medications she prefers to use and why she uses them.        Prior to Admission medications    Medication Sig Last Dose Taking? Auth Provider   acetaminophen (TYLENOL) 325 MG tablet Take 2 tablets (650 mg) by mouth every 4 hours as needed for pain 7/25/2018 at AM Yes Brenda Gurrola PA-C   artificial saliva (BIOTENE DRY MOUTHWASH) LIQD liquid Swish and spit 5-10 mLs in mouth 4 times daily as needed for dry mouth Past Month at Unknown time Yes Brenda Gurrola PA-C   calcium-vitamin D (CALTRATE) 600-400 MG-UNIT per tablet Take 1 tablet by mouth daily Past Week at Unknown time Yes Brenda Gurrola PA-C   diphenoxylate-atropine (LOMOTIL) 2.5-0.025 MG per tablet Take 1 tablet by mouth 4 times daily as needed for diarrhea . If having less than two bowel movements daily, DO NOT GIVE 7/25/2018 at AM Yes Brenda Gurrola PA-C   Hydrocortisone (BUTT PASTE, WITH H.C,) Use topically 3-4 times daily 7/24/2018 at PM Yes Baron Seth MD   loperamide (LOPERAMIDE A-D) 2 MG tablet Take 1 tablet (2 mg) by mouth 4 times daily as needed for diarrhea . If having less than two bowel movements daily, DO NOT GIVE 7/25/2018 at AM Yes Brenda Gurrola PA-C   menthol-zinc oxide (CALMOSEPTINE)  0.44-20.625 % OINT ointment Apply thick layer to irritated perianal skin 4 times per day and after bowel movements. Past Week at Unknown time Yes Zaire Madison MD   methocarbamol (ROBAXIN) 500 MG tablet Take 1 tablet (500 mg) by mouth 2 times daily as needed for muscle spasms 7/25/2018 at AM Yes Shen Ray MD   simethicone (MYLICON) 40 MG/0.6ML suspension Take 0.6 mLs (40 mg) by mouth every 6 hours as needed for cramping Past Month at Unknown time Yes Brenda Gurrola PA-C   traMADol (ULTRAM) 50 MG tablet Take 1 tablet (50 mg) by mouth every 6 hours 7/25/2018 at AM Yes Brenda Gurrola PA-C   vitamin B complex with vitamin C (VITAMIN  B COMPLEX) TABS tablet Take 1 tablet by mouth daily 7/25/2018 at AM Yes Reported, Patient   ondansetron (ZOFRAN-ODT) 8 MG ODT tab Take 1 tablet (8 mg) by mouth every 8 hours as needed For nausea  Patient not taking: Reported on 6/22/2018 More than a month at Unknown time  Brenda Gurrola PA-C         Medication history completed by: Theresa Lares, PD1 Student

## 2018-07-26 NOTE — ANESTHESIA POSTPROCEDURE EVALUATION
Patient: Elizabeth Taylor    Procedure(s):  Pain block    Diagnosis:pain block  Diagnosis Additional Information: No value filed.    Anesthesia Type:  Peripheral Nerve Block    Note:  Anesthesia Post Evaluation    Patient location during evaluation: PACU  Patient participation: Able to fully participate in evaluation  Level of consciousness: awake  Pain management: adequate  Airway patency: patent  Cardiovascular status: acceptable  Respiratory status: acceptable  Hydration status: acceptable  PONV: none             Last vitals:  Vitals:    07/26/18 1010 07/26/18 1020 07/26/18 1043   BP:  121/78 103/57   Pulse:      Resp: 9 10 21   Temp:   36.3  C (97.4  F)   SpO2: 93% 98% 93%         Electronically Signed By: José Luis Mckeon MD  July 26, 2018  11:03 AM

## 2018-07-26 NOTE — ED NOTES
[facility], [place]   ED Nurse to Floor Handoff     Elizabeth Taylor is a 78 year old female who speaks English and lives with family members,  in a home  They arrived in the ED by ambulance from home    ED Chief Complaint: Fall and Hip Pain    ED Dx;   Final diagnoses:   Closed displaced fracture of neck of right femur (H)         Needed?: No    Allergies:   Allergies   Allergen Reactions     Darvon [Propoxyphene]      Had reaction in teen years     Penicillins      As a child     Ketoconazole Rash   .  Past Medical Hx:   Past Medical History:   Diagnosis Date     Anal cancer (H)      Endometriosis, site unspecified      Thyroiditis, unspecified     Thryoiditis-resolved      Baseline Mental status: WDL  Current Mental Status changes: at basesline    Infection present or suspected this encounter: no  Sepsis suspected: No  Isolation type: No active isolations     Activity level - Baseline/Home:  Independent  Activity Level - Current:   Total Care    Bariatric equipment needed?: No    In the ED these meds were given:   Medications   lactated ringers infusion ( Intravenous New Bag 7/25/18 2129)   HYDROmorphone (PF) (DILAUDID) injection 0.5 mg (0.5 mg Intravenous Given 7/25/18 2230)   enoxaparin (LOVENOX) injection 30 mg (30 mg Subcutaneous Given 7/25/18 2129)       Drips running?  Yes    Home pump  No    Current LDAs  Peripheral IV 03/30/18 Right;Lateral Lower forearm (Active)   Number of days:117       Peripheral IV 05/25/18 Left Lower forearm (Active)   Number of days:61       Peripheral IV 07/25/18 Right Lower forearm (Active)   Number of days:0       Peripheral IV 07/25/18 Right Lower forearm (Active)   Number of days:0       Port A Cath Single 02/08/18 Right Chest wall (Active)   Number of days:167       Urethral Catheter (Active)   Number of days:114       Wound 03/21/18 Sacrum Burn open radiation burn on sacrum  (Active)   Number of days:126       Incision/Surgical Site  02/08/18 Right Chest (Active)   Number of days:167       Labs results:   Labs Ordered and Resulted from Time of ED Arrival Up to the Time of Departure from the ED   COMPREHENSIVE METABOLIC PANEL - Abnormal; Notable for the following:        Result Value    Albumin 3.2 (*)     All other components within normal limits   CBC WITH PLATELETS - Abnormal; Notable for the following:     Hemoglobin 11.5 (*)     All other components within normal limits   CBC WITH PLATELETS DIFFERENTIAL   INR   ABO/RH TYPE AND SCREEN       Imaging Studies:   Recent Results (from the past 24 hour(s))   XR Pelvis w Hip Right G/E 2 Views    Narrative    Exam:  XR PELVIS AND HIP RIGHT 2 VIEWS, 7/25/2018 5:52 PM    History: Trauma;     Comparison:  Pelvis MRI 5/25/2018, PET/CT 5/25/2018.    Findings:  AP and crosstable lateral views of the right hip.  Displaced, foreshortened right femoral neck fracture. Remaining  osseous structures are intact. Low lumbar predominant facet  arthropathy and mild degenerative changes in the left hip. Visualized  soft tissues are unremarkable.      Impression    Impression:    Displaced, foreshortened right femoral neck fracture.    I have personally reviewed the examination and initial interpretation  and I agree with the findings.    SHARON PAREKH MD   XR Chest 1 View    Narrative    XR CHEST 1 VW  7/25/2018 6:44 PM      HISTORY: trauma;     COMPARISON: 4/2/2018    FINDINGS: Right-sided Port-A-Cath tip projects over the upper right  atrium. Cardiac silhouette is within normal limits. No pneumothorax.  Bilateral trace pleural effusions. Subacute right-sided rib fractures.  No acute displaced fracture.      Impression    IMPRESSION:   1. Subacute right-sided rib fractures with no acute displaced fracture  identified.  2. Bilateral trace pleural effusions.    I have personally reviewed the examination and initial interpretation  and I agree with the findings.    SHARON PAREKH MD   XR Femur Right 2  Views    Narrative    XR FEMUR RT 2 VW  7/25/2018 6:45 PM      HISTORY: trauma;     COMPARISON: Earlier today    FINDINGS: Redemonstration of right femoral neck fracture with superior  displacement of the distal fragment. No other fractures identified.      Impression    IMPRESSION: Redemonstration of right femoral neck fracture with  superior displacement of the distal fragment.     I have personally reviewed the examination and initial interpretation  and I agree with the findings.    SHARON PAREKH MD   US Lower Extremity Venous Duplex Bilateral    Narrative    EXAMINATION: DOPPLER VENOUS ULTRASOUND OF BILATERAL LOWER EXTREMITIES,  7/25/2018 10:03 PM     COMPARISON: None.    HISTORY: Assess for DVT with prolonged immobilization, swelling    TECHNIQUE:  Gray-scale evaluation with compression, spectral flow and  color Doppler assessment of the deep venous system of both legs from  groin to knee, and then at the ankles.    FINDINGS:  In both lower extremities, the common femoral, femoral, popliteal and  posterior tibial veins demonstrate normal compressibility and blood  flow.        Impression    IMPRESSION:   1.  No evidence of deep venous thrombosis in either lower extremity.         Recent vital signs:   /74  Temp 98.1  F (36.7  C) (Oral)  Resp 16  SpO2 96%    Cardiac Rhythm: Normal Sinus  Pt needs tele? No  Skin/wound Issues: None    Code Status: Full Code    Pain control: fair    Nausea control: pt had none    Abnormal labs/tests/findings requiring intervention: see results    Family present during ED course? Yes   Family Comments/Social Situation comments: none      Tasks needing completion: None    Shweta Mcmahon RN  [facility]-- [phone] [place] ED  [phone] [facility] ED

## 2018-07-26 NOTE — CONSULTS
"[facility]  ORTHOPAEDIC SURGERY HISTORY AND PHYSICAL    CC: Right hip pain.    DATE OF INJURY: Approximately 2 weeks ago.    HISTORY OF PRESENT ILLNESS:   The orthopaedic surgery service was consulted by Dr. Gutierrez, for evaluation and treatment recommendations of right femoral neck fracture..    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  The patient sustained a ground-level mechanical fall approximately 2 weeks ago while ambulating to her living room.  She landed on her right hip.  She had immediate onset of right groin pain, difficulty ambulating, but was able to get up to bed.  She had assistance of family, and has been operating under the assumption that she had a \"groin pull\" over the past 2 weeks.  She has had difficulty and significant pain with ambulation and per her sister's report, has been hobbling around the house and has been mainly bedridden.  Her family finally convinced her to come to the emergency department today.    Patient denies hitting her head or loss of consciousness.  She denies any pain in her other joints other than her right hip.  She denies any pre-existing right hip pain.  She denies any numbness or tingling in her bilateral lower extremities.     Of note, the patient recently finished treatment for her anal carcinoma.  She has been slowly recovering from this.  She notes significant generalized weakness, but has been getting stronger prior to her fall.  She uses a walker for ambulation.    PAST MEDICAL HISTORY:   Past Medical History:   Diagnosis Date     Anal cancer (H)      Endometriosis, site unspecified      Thyroiditis, unspecified     Thryoiditis-resolved     Patient denies any personal history of bleeding disorders, clotting disorders, or adverse reactions to anesthesia.    PAST SURGICAL HISTORY:    Past Surgical " History:   Procedure Laterality Date     APPENDECTOMY      as a child     COLONOSCOPY N/A 4/13/2017    Procedure: COLONOSCOPY;  Surgeon: Juan Dos Santos MD;  Location: [facility] GI     INSERT PORT VASCULAR ACCESS Right 2/8/2018    Procedure: INSERT PORT VASCULAR ACCESS;  Place Single Lumen Venous Chest Port Placement;  Surgeon: Zaire Moreira PA-C;  Location: [facility] OR     SURGICAL HISTORY OF -       endometriosis       MEDICATIONS:   Prior to Admission medications    Medication Sig Last Dose Taking? Auth Provider   acetaminophen (TYLENOL) 325 MG tablet Take 2 tablets (650 mg) by mouth every 4 hours as needed for pain   Brenda Gurrola PA-C   artificial saliva (BIOTENE DRY MOUTHWASH) LIQD liquid Swish and spit 5-10 mLs in mouth 4 times daily as needed for dry mouth  Patient not taking: Reported on 6/22/2018   Brenda Gurrola PA-C   calcium-vitamin D (CALTRATE) 600-400 MG-UNIT per tablet Take 1 tablet by mouth daily  Patient not taking: Reported on 6/22/2018   Brenda Gurrola PA-C   diphenoxylate-atropine (LOMOTIL) 2.5-0.025 MG per tablet Take 1 tablet by mouth 4 times daily as needed for diarrhea . If having less than two bowel movements daily, DO NOT GIVE  Patient not taking: Reported on 6/22/2018   Brenda Gurrola PA-C   Hydrocortisone (BUTT PASTE, WITH H.C,) Use topically 3-4 times daily   Baron Seth MD   loperamide (LOPERAMIDE A-D) 2 MG tablet Take 1 tablet (2 mg) by mouth 4 times daily as needed for diarrhea . If having less than two bowel movements daily, DO NOT GIVE  Patient not taking: Reported on 6/22/2018   Brenda Gurrola PA-C   menthol-zinc oxide (CALMOSEPTINE) 0.44-20.625 % OINT ointment Apply thick layer to irritated perianal skin 4 times per day and after bowel movements.   Zaire Madison MD   methocarbamol (ROBAXIN) 500 MG tablet Take 1 tablet (500 mg) by mouth 2 times daily as needed for muscle spasms   Shen Ray MD   multivitamin, therapeutic  (THERA-VIT) TABS tablet Take 1 tablet by mouth daily   Brenda Gurrola PA-C   ondansetron (ZOFRAN-ODT) 8 MG ODT tab Take 1 tablet (8 mg) by mouth every 8 hours as needed For nausea  Patient not taking: Reported on 6/22/2018   Brenda Gurrola PA-C   simethicone (MYLICON) 40 MG/0.6ML suspension Take 0.6 mLs (40 mg) by mouth every 6 hours as needed for cramping  Patient not taking: Reported on 6/22/2018   Brenda Gurrola PA-C   traMADol (ULTRAM) 50 MG tablet Take 1 tablet (50 mg) by mouth every 6 hours   Brenda Gurrola PA-C   vitamin B complex with vitamin C (VITAMIN  B COMPLEX) TABS tablet Take 1 tablet by mouth daily   Reported, Patient       ALLERGIES:   Darvon [propoxyphene]; Penicillins; and Ketoconazole    SOCIAL HISTORY:   Patient currently lives with her sister and her sister's .  She has been slowly recuperating from her cancer treatment.  She notes increasing activity prior to her fall, the ability to walk across the guaman and make 7 laps around the ballroom at the assisted living.  She uses a walker for ambulation, but did not need this prior to her cancer treatment.  She denies smoking, alcohol use, or drug use.    FAMILY HISTORY:  Family History   Problem Relation Age of Onset     Cancer Sister      Cancer Maternal Grandmother      lymphoma     HEART DISEASE Father      MI     Uterine Cancer Niece          REVIEW OF SYSTEMS:   Positive for above in the HPI.. Otherwise a 10-point reviews of systems was negative except as noted above in the HPI.     PHYSICAL EXAM:   Vitals:    07/25/18 1634 07/25/18 1810   BP: 133/84    Resp: 16    Temp: 98.1  F (36.7  C)    TempSrc: Oral    SpO2: 97% 100%     General: Awake, alert, appropriate, following commands, NAD.  Neuro: CN II-XII grossly intact.   Skin: No obvious rashes, see extremity exam below for details.  HEENT: Normal.   Lungs: Breathing comfortably and nonlabored, no wheezes or stridor noted.  Heart/Cardiovascular: Regular pulse, no  peripheral cyanosis.  Neck: Nontender to palpation, full range of motion without pain.  Pelvis: Stable to AP and Lateral compression, non-tender.  Right Upper Extremity: No deformity, skin intact. No significant tenderness to palpation over clavicle, AC joint, shoulder, arm, elbow, forearm, wrist. Normal ROM shoulder, elbow, wrist without pain. Motor intact distally with finger flexion/extension/intrinsics/EPL, OK sign 5/5 strength. SILT ax/m/r/u nerve distributions. Radial pulse palpable, 2+.   Left Upper Extremity: No deformity, skin intact. No significant tenderness to palpation over clavicle, AC joint, shoulder, arm, elbow, forearm, wrist. Normal ROM shoulder, elbow, wrist without pain. Motor intact distally with finger flexion/extension/intrinsics/EPL, OK sign 5/5 strength. SILT ax/m/r/u nerve distributions. Radial pulse palpable, 2+.   Right Lower Extremity: Shortened and externally rotated.  Hip range of motion not attempted due to known fracture.  No tenderness to palpation of the right knee, leg, ankle, or foot.  There is mild edema of the right foot.  No pain with range of motion of the right foot or ankle.  Motor intact distally TA/GSC/EHL/FHL with 5/5 strength. SILT sp/dp/tibial/saph/sural nerves. DP/PT pulses palpable, 2+, toes warm and well perfused.   Left Lower Extremity: No deformity, skin intact. No significant tenderness to palpation over thigh, knee, leg, ankle/foot. No pain with ROM hip/knee/ankle. Motor intact distally TA/GSC/EHL/FHL with 5/5 strength. SILT sp/dp/tibial/saph/sural nerves. DP/PT pulses palpable, 2+, toes warm and well perfused.     LABS:  Hemoglobin   Date Value Ref Range Status   06/22/2018 10.8 (L) 11.7 - 15.7 g/dL Final   05/18/2018 10.2 (L) 11.7 - 15.7 g/dL Final     WBC   Date Value Ref Range Status   06/22/2018 6.1 4.0 - 11.0 10e9/L Final     Platelet Count   Date Value Ref Range Status   06/22/2018 255 150 - 450 10e9/L Final     INR   Date Value Ref Range Status    2018 1.05 0.86 - 1.14 Final     Creatinine   Date Value Ref Range Status   2018 0.62 0.52 - 1.04 mg/dL Final     Glucose   Date Value Ref Range Status   2018 83 70 - 99 mg/dL Final       IMAGING:  AP the pelvis with lateral of the right hip shows a displaced right subcapital femoral neck fracture.    AP and lateral of the right femur reveals no further fracture dislocation.    IMPRESSION:   Elizabeth Taylor is a 78 year old female with a past medical history significant for anal cancer status post recent chemotherapy and radiation therapy status-post mechanical fall approximately 2 weeks ago with the followin.  Displaced right subcapital femoral neck fracture    RECOMMENDATIONS:   - Admit to trauma.  - Plan for OR: Tentative plan for tomorrow 2018 for right hip hemiarthroplasty.   -Consent: Will obtain with final operative plan obtained.   -Pre-op labs: Ordered in ED.   -Medicine clearance: Pending, appreciate, cares  - Anticoagulation/DVT Prophylaxis: Okay for dose of Lovenox tonight, hold in anticipation of operating room tomorrow.  - Antibiotics/Tetanus: Perioperative ordered.  - X-rays/Imaging: Recommend DVT ultrasound bilateral lower extremities given 2 weeks of immobilization.  - Activity: Bedrest  - Weight bearing: Nonweightbearing right lower extremity  - Pain control: Per primary team, okay for local block per anesthesia  - Diet: Regular diet appropriate for tonight, n.p.o. at midnight for potential OR tomorrow.  - Follow-up: To be determined  - Disposition: Inpatient    Assessment and Plan discussed with Dr. Murillo and Dr. Phan, Orthopaedic Surgery staff.     Lennox Mcnally MD 18  Orthopaedic Surgery Resident, PGY-4    I, Zaire Phan, personally saw and evaluated this patient on 2018.  I agree with the resident's above documented note in physical exam.  She is a multiply comorbid female status post recent chemotherapy and radiation for anal cancer,  with a subacute displaced right femoral neck fracture.  I have indicated her for hemiarthroplasty.  We discussed the risks and benefits of the surgery including risk of infection, risk of dislocation, risk of leg length discrepancy, risk of sciatic nerve injury, risk of periprosthetic fracture or need for conversion to a hip arthroplasty in the future due to groin or hip pain.  The main benefit of surgery is that I think it will help her walk again.  She understands these risks and benefits and she would like to proceed.  [facility]    For questions about this patient, please contact me at my pager.

## 2018-07-27 ENCOUNTER — APPOINTMENT (OUTPATIENT)
Dept: GENERAL RADIOLOGY | Facility: CLINIC | Age: 78
DRG: 470 | End: 2018-07-27
Attending: PHYSICIAN ASSISTANT
Payer: MEDICARE

## 2018-07-27 ENCOUNTER — APPOINTMENT (OUTPATIENT)
Dept: PHYSICAL THERAPY | Facility: CLINIC | Age: 78
DRG: 470 | End: 2018-07-27
Payer: MEDICARE

## 2018-07-27 ENCOUNTER — CARE COORDINATION (OUTPATIENT)
Dept: ONCOLOGY | Facility: CLINIC | Age: 78
End: 2018-07-27

## 2018-07-27 LAB
ANION GAP SERPL CALCULATED.3IONS-SCNC: 6 MMOL/L (ref 3–14)
BUN SERPL-MCNC: 10 MG/DL (ref 7–30)
C DIFF STL QL CULT: ABNORMAL
C DIFF TOX B STL QL: POSITIVE
CALCIUM SERPL-MCNC: 8.6 MG/DL (ref 8.5–10.1)
CHLORIDE SERPL-SCNC: 102 MMOL/L (ref 94–109)
CO2 SERPL-SCNC: 28 MMOL/L (ref 20–32)
CREAT SERPL-MCNC: 0.53 MG/DL (ref 0.52–1.04)
ERYTHROCYTE [DISTWIDTH] IN BLOOD BY AUTOMATED COUNT: 13.8 % (ref 10–15)
GFR SERPL CREATININE-BSD FRML MDRD: >90 ML/MIN/1.7M2
GLUCOSE SERPL-MCNC: 91 MG/DL (ref 70–99)
HCT VFR BLD AUTO: 35 % (ref 35–47)
HGB BLD-MCNC: 11.3 G/DL (ref 11.7–15.7)
INTERPRETATION ECG - MUSE: NORMAL
MAGNESIUM SERPL-MCNC: 1.8 MG/DL (ref 1.6–2.3)
MCH RBC QN AUTO: 28.4 PG (ref 26.5–33)
MCHC RBC AUTO-ENTMCNC: 32.3 G/DL (ref 31.5–36.5)
MCV RBC AUTO: 88 FL (ref 78–100)
PHOSPHATE SERPL-MCNC: 2.9 MG/DL (ref 2.5–4.5)
PLATELET # BLD AUTO: 329 10E9/L (ref 150–450)
POTASSIUM SERPL-SCNC: 3.9 MMOL/L (ref 3.4–5.3)
RBC # BLD AUTO: 3.98 10E12/L (ref 3.8–5.2)
SODIUM SERPL-SCNC: 137 MMOL/L (ref 133–144)
SPECIMEN SOURCE: ABNORMAL
SPECIMEN SOURCE: ABNORMAL
WBC # BLD AUTO: 7.4 10E9/L (ref 4–11)

## 2018-07-27 PROCEDURE — 84100 ASSAY OF PHOSPHORUS: CPT | Performed by: PHYSICIAN ASSISTANT

## 2018-07-27 PROCEDURE — 40000193 ZZH STATISTIC PT WARD VISIT

## 2018-07-27 PROCEDURE — 25000128 H RX IP 250 OP 636: Performed by: SURGERY

## 2018-07-27 PROCEDURE — 99223 1ST HOSP IP/OBS HIGH 75: CPT | Performed by: NURSE PRACTITIONER

## 2018-07-27 PROCEDURE — A9270 NON-COVERED ITEM OR SERVICE: HCPCS | Mod: GY | Performed by: SURGERY

## 2018-07-27 PROCEDURE — 36415 COLL VENOUS BLD VENIPUNCTURE: CPT | Performed by: PHYSICIAN ASSISTANT

## 2018-07-27 PROCEDURE — 85027 COMPLETE CBC AUTOMATED: CPT | Performed by: PHYSICIAN ASSISTANT

## 2018-07-27 PROCEDURE — 25000128 H RX IP 250 OP 636: Performed by: NURSE PRACTITIONER

## 2018-07-27 PROCEDURE — 25000132 ZZH RX MED GY IP 250 OP 250 PS 637: Mod: GY | Performed by: SURGERY

## 2018-07-27 PROCEDURE — 99233 SBSQ HOSP IP/OBS HIGH 50: CPT | Performed by: PHYSICIAN ASSISTANT

## 2018-07-27 PROCEDURE — 83735 ASSAY OF MAGNESIUM: CPT | Performed by: PHYSICIAN ASSISTANT

## 2018-07-27 PROCEDURE — 12000008 ZZH R&B INTERMEDIATE UMMC

## 2018-07-27 PROCEDURE — 25000125 ZZHC RX 250: Performed by: STUDENT IN AN ORGANIZED HEALTH CARE EDUCATION/TRAINING PROGRAM

## 2018-07-27 PROCEDURE — 25000132 ZZH RX MED GY IP 250 OP 250 PS 637: Mod: GY | Performed by: PHYSICIAN ASSISTANT

## 2018-07-27 PROCEDURE — 97161 PT EVAL LOW COMPLEX 20 MIN: CPT | Mod: GP

## 2018-07-27 PROCEDURE — 40000141 ZZH STATISTIC PERIPHERAL IV START W/O US GUIDANCE

## 2018-07-27 PROCEDURE — 87493 C DIFF AMPLIFIED PROBE: CPT | Performed by: SURGERY

## 2018-07-27 PROCEDURE — 87075 CULTR BACTERIA EXCEPT BLOOD: CPT | Performed by: SURGERY

## 2018-07-27 PROCEDURE — A9270 NON-COVERED ITEM OR SERVICE: HCPCS | Mod: GY | Performed by: PHYSICIAN ASSISTANT

## 2018-07-27 PROCEDURE — 73600 X-RAY EXAM OF ANKLE: CPT | Mod: RT

## 2018-07-27 PROCEDURE — 97530 THERAPEUTIC ACTIVITIES: CPT | Mod: GP

## 2018-07-27 PROCEDURE — 80048 BASIC METABOLIC PNL TOTAL CA: CPT | Performed by: PHYSICIAN ASSISTANT

## 2018-07-27 RX ORDER — HYDROMORPHONE HCL/0.9% NACL/PF 0.2MG/0.2
0.2 SYRINGE (ML) INTRAVENOUS ONCE
Status: COMPLETED | OUTPATIENT
Start: 2018-07-27 | End: 2018-07-27

## 2018-07-27 RX ORDER — SODIUM CHLORIDE, SODIUM LACTATE, POTASSIUM CHLORIDE, CALCIUM CHLORIDE 600; 310; 30; 20 MG/100ML; MG/100ML; MG/100ML; MG/100ML
1000 INJECTION, SOLUTION INTRAVENOUS CONTINUOUS
Status: DISCONTINUED | OUTPATIENT
Start: 2018-07-27 | End: 2018-07-28

## 2018-07-27 RX ORDER — VANCOMYCIN HYDROCHLORIDE 50 MG/ML
125 KIT ORAL 4 TIMES DAILY
Status: DISCONTINUED | OUTPATIENT
Start: 2018-07-27 | End: 2018-07-30 | Stop reason: HOSPADM

## 2018-07-27 RX ADMIN — ENOXAPARIN SODIUM 30 MG: 30 INJECTION SUBCUTANEOUS at 10:35

## 2018-07-27 RX ADMIN — MAGNESIUM SULFATE HEPTAHYDRATE 2 G: 40 INJECTION, SOLUTION INTRAVENOUS at 10:34

## 2018-07-27 RX ADMIN — B-COMPLEX W/ C & FOLIC ACID TAB 1 MG 1 TABLET: 1 TAB at 08:27

## 2018-07-27 RX ADMIN — Medication: at 21:17

## 2018-07-27 RX ADMIN — Medication 2.5 MG: at 13:50

## 2018-07-27 RX ADMIN — Medication 5 MG: at 20:08

## 2018-07-27 RX ADMIN — POTASSIUM CHLORIDE 20 MEQ: 750 TABLET, EXTENDED RELEASE ORAL at 10:34

## 2018-07-27 RX ADMIN — VANCOMYCIN HYDROCHLORIDE 125 MG: KIT at 21:00

## 2018-07-27 RX ADMIN — VANCOMYCIN HYDROCHLORIDE 125 MG: KIT at 08:42

## 2018-07-27 RX ADMIN — Medication: at 08:28

## 2018-07-27 RX ADMIN — VANCOMYCIN HYDROCHLORIDE 125 MG: KIT at 17:09

## 2018-07-27 RX ADMIN — SODIUM CHLORIDE, POTASSIUM CHLORIDE, SODIUM LACTATE AND CALCIUM CHLORIDE 1000 ML: 600; 310; 30; 20 INJECTION, SOLUTION INTRAVENOUS at 07:56

## 2018-07-27 RX ADMIN — ACETAMINOPHEN 1000 MG: 500 TABLET, FILM COATED ORAL at 08:27

## 2018-07-27 RX ADMIN — VANCOMYCIN HYDROCHLORIDE 125 MG: KIT at 12:35

## 2018-07-27 RX ADMIN — CLINDAMYCIN PHOSPHATE 900 MG: 900 INJECTION, SOLUTION INTRAVENOUS at 01:30

## 2018-07-27 RX ADMIN — CLINDAMYCIN PHOSPHATE 900 MG: 900 INJECTION, SOLUTION INTRAVENOUS at 08:27

## 2018-07-27 RX ADMIN — ACETAMINOPHEN 1000 MG: 500 TABLET, FILM COATED ORAL at 17:09

## 2018-07-27 RX ADMIN — Medication 1 TABLET: at 08:28

## 2018-07-27 RX ADMIN — THERA TABS 1 TABLET: TAB at 08:27

## 2018-07-27 RX ADMIN — DICLOFENAC EPOLAMINE 1 PATCH: 0.01 PATCH TOPICAL at 21:01

## 2018-07-27 RX ADMIN — Medication 0.2 MG: at 14:22

## 2018-07-27 ASSESSMENT — VISUAL ACUITY
OU: GLASSES
OU: GLASSES

## 2018-07-27 ASSESSMENT — ACTIVITIES OF DAILY LIVING (ADL)
ADLS_ACUITY_SCORE: 12
ADLS_ACUITY_SCORE: 10
ADLS_ACUITY_SCORE: 12
ADLS_ACUITY_SCORE: 12

## 2018-07-27 NOTE — PROGRESS NOTES
Spoke with pt's sister, Sahil, who called to report pt is currently admitted to [facility] and had 1/2 hip (ball) replacement surgery.  Pt will likely be discharged to a TCU in the future.  Pt has appts on Monday, 7/30/18 in [facility] and Dr. Rosas's office.  RN stated would send message to teams about pt's admission so they can cancel appts.  Pt is scheduled to see Dr. Ray on Aug 31.  We will wait for now on changing pt's return appt with Dr. Ray as sometimes patients will come for visit while still in TCU (depending on their condition).  Sahil also stated pt has a scan scheduled for Monday but RN not seeing it on appt desk.  Spoke with Tennille Brennan, MADELEINE, radiation therapy, pt currently admitted.  She will take care of cancelling/rescheduling pt's appt for Monday, 7/30.  Sent message to [facility] surgery pool regarding need to cancel/reschedule pt's Monday, 7/30 appt with Dr. Rosas.

## 2018-07-27 NOTE — PLAN OF CARE
Problem: Patient Care Overview  Goal: Plan of Care/Patient Progress Review  OT 7B: Cancel. Attempt to see pt x1, pt with other providers. Unable to return. Will reschedule.

## 2018-07-27 NOTE — PROGRESS NOTES
"Postop Check    COLLETTE, doing well.  States her pain is significantly better now than preop.  No SOB, no CP.  No numbness or tingling in legs    /84  Pulse 78  Temp 96.3  F (35.7  C) (Oral)  Resp 13  Ht 1.676 m (5' 6\")  Wt 47.9 kg (105 lb 9.6 oz)  SpO2 98%  BMI 17.04 kg/m2    Exam  NAD AOx3  NLB on NC, equal BS BL   RRR No m/r/g  Abd soft, NT ND  Incision R Hip CDI, no brusing.    R foot wwp with + DP pulse    A/P  POD#0 s/p R yvette hip arthroplasty.  Doing well  - adat  - pain control   - clinda x24 h per ortho  - PT OT    Quin Jackson MD  [facility]   [phone]    "

## 2018-07-27 NOTE — PLAN OF CARE
Problem: Patient Care Overview  Goal: Plan of Care/Patient Progress Review  Discharge Planner PT   Patient plan for discharge: Rehab  Current status: PT: Eval completed, treat initiated. Pt supine upon approach in hooklying and had been for a few minutes per pt request, appeared uncomfortable. When PT initially attempt to assist pt to find more comfortable position pt yelled with pain, after education on how PT will assist/position and communication so moving together pt able to relax R LE some and tolerate mod A to slowly move B LEs to EOB hooklying. Pt needed heavy mod A to light max A to pivot on L hip, A B LEs and abd hayden win position throughout, to then achieve sitting EOB. Pt tolerated sitting EOB 9 minutes with SBA, after mod A to position R LE. Unable to attempt standing due to fatigue and pain. Heavy mod A sit>supine into hooklying and dependent Ax2 boost in bed. Pt needed mod A to max A to position partial L sidelying with pillow supporting R LE full length and increased knee ext.  Barriers to return to prior living situation: functional and medical status  Recommendations for discharge: TCU  Rationale for recommendations: based on functional mobility       Entered by: Stacey Alexis 07/27/2018 1:08 PM

## 2018-07-27 NOTE — PLAN OF CARE
"Problem: Patient Care Overview  Goal: Plan of Care/Patient Progress Review  Outcome: No Change  /64 (BP Location: Right arm)  Pulse 78  Temp 100  F (37.8  C) (Oral)  Resp 18  Ht 1.676 m (5' 6\")  Wt 47.9 kg (105 lb 9.6 oz)  SpO2 92%  BMI 17.04 kg/m2     Neuro: A&O X 4  Cardiac: VSS  Respiratory: lung sounds clear bilaterally 92-95% on RA, pt refused capnography at approx 0841, continuous pulse ox in place  GI/: adequate but minimal urine output from 0720 to end of shift, passing gas, no bm this shift, no diarrhea  Diet/Appetite: tolerating regular diet, fair appetite, no n/v reported  Activity: PT saw pt today, position changes completed with pillows under angela prominences  Pain: c/o pain, gave 2.5 mg oxy, then palliative put in order for a one time dose of dilaudid at 1415 d/t c/o moderate to severe pain    CMS intact, pillow supporting RLE    Plan: progress towards baseline, pain control, TCU placement        "

## 2018-07-27 NOTE — PROGRESS NOTES
[facility], [place]  Trauma Service Progress Note    Date of Service (when I saw the patient): 07/27/2018     Assessment & Plan     Trauma Mechanism:Ground level fall 2 weeks prior to presentation   Known Injuries:  1. Traumatic right femoral neck fracture   2. Acute traumatic pain       Other diagnoses:  1. Stage III anal cancer s/p Chemo and radiation   2. Limited mobility 2/2 # 1   3. Recent catheter related wound due to perianal radiation   4. Hx of subacute right sided rib fractures 8-10 (dx 3/18)  5. Peripheral edema   6. C. Diff diarrhea       Procedure(s):  1.RIGHT Fascia Iliaca Catheter--Regional anesthesia pain 7/26/18  2. RIGHT hemiarthroplasty--Dr Phan 7/26/18      Plan:  1. Trauma   1. TRN to do fall teaching and trauma education.   2. UA/UC negative   3. X-ray of right ankle given increased pain and swelling today per patient report  2. Pain control: oral/IV medications, scheduled tylenol, PRN opioids, cautious uses of pain medications given hx of AMS with narcotics per last admission (pt received concurrent tincture of opium and oral narcotics pain medications).  Add flector patch. Discontinue lidoderm patch  3. Appreciate regional pain team following. Fascia block management deferred to anesthesia   4. Appreciate orthopedics following.   1. Wound cares per orthopedics. Ice to right hip   2. Posterior hip precautions x3 mos. Abductor pillow while in bed.   3. WBAT to RLE  4. F/U clinic in 2 weeks for wound check with Dr Phan or team.   5. Diet as she tolerates. Encourage oral intake. SL IVF when taking PO well.   6. Palliative care consult given age, recent subacute rib fractures and now with new right femoral neck fracture   7. Physical and occupational therapy consults. OOB. Mobilize as able   8. C. Diff--discontinue IV flagyl, will start oral Vancomycin 125 mg QID per IDSA guidelines.  Will discontinue senna/colace for now. Monitor closely. No fever, leukocytosis  or abdominal pain.   9. Local cares to periarea to prevent skin injury.   10. Replace Mag today level 1.8 with 2 grams.   11. Home medications reviewed and appropriate medications resumed  12. Social work consult for placement and discharge planning. Needs TCU--pt would like to [facility] to [facility] upon discharge.       General Cares:                        PPI/H2 blocker:  None indicated                        DVT prophylaxis: Lovenox 30 mg subcutaneous                        Bowel Regimen/Date of last stool: 7/27/18                       Pulmonary toilet:  IS and acapella                        Lines / drains: no farias per pt request.     Code status: full code    Disposition: inpatient for now, anticipate discharge  POD # 3 or 4    ETOH: see admission note      Discharge goals:     Adequate pain management:  No     VSS x24 hours:  Yes     Hemoglobin stable x 48 hours: admitted less than 48 hours     Ambulating safely and/or therapy evals complete:  No     Drains/lines removed or plan in place to manage:  Right hip block, PIV's     Teaching done:  Yes   Expected D/C date: 7/29 or 7/30 pending improvement     Interval History  Course reviewed. No acute events overnight. C diff sent overnight and positive. Pt denies fever or abdominal pain. No leukocytosis on am labs.  Pt reports pain in right hip karla with repositioning and movement. Reports pain and swelling in right ankle--thinks this has increased since surgery. Passing flatus and voiding in briefs.     ROS x 8 negative with exception of those things listed in interval hx    Physical Exam   Temp: 96.8  F (36  C) Temp src: Oral BP: 122/64   Heart Rate: 98 Resp: 18 SpO2: 92 % O2 Device: None (Room air) Oxygen Delivery: 5 LPM  Vitals:    07/25/18 2310   Weight: 47.9 kg (105 lb 9.6 oz)     Vital Signs with Ranges  Temp:  [96  F (35.6  C)-97.4  F (36.3  C)] 96.8  F (36  C)  Heart Rate:  [61-98] 98  Resp:  [7-24] 18  BP: (102-179)/() 122/64  SpO2:  [92  %-100 %] 92 %  I/O last 3 completed shifts:  In: 1684 [P.O.:240; I.V.:1444]  Out: 930 [Urine:900; Blood:30]  # Pain Assessment:  Current Pain Score 7/27/2018   Patient currently in pain? denies   Pain score (0-10) -   Pain location -   Pain descriptors -   - Elizabeth is experiencing pain due to  Right hip fracture . Pain management was discussed and the plan was created in a collaborative fashion.  Elizabeth's response to the current recommendations: engaged  - Opioid regimen:  Oxycodone   - Bowel regimen: not needed due to C diff   - Pharmacologic adjuvants: Acetaminophen and Lidocaine patch  - Non-pharmacologic adjuvants: Ice, Physical Therapy and Massage    [place] Coma Scale - Total 15/15    Constitutional: Awake, alert, cooperative, laying in bed.   Eyes: Lids and lashes normal, pupils equal, round and reactive to light, extra ocular muscles intact, sclera clear, conjunctiva normal.  ENT: Normocephalic, atraumatic. Tongue moist but lips visibly dry.   Respiratory: No increased work of breathing, good air exchange, clear to auscultation bilaterally, no crackles or wheezing.  Cardiovascular:  regular rate and rhythm, normal S1 and S2, no S3 or S4, and no murmur noted.  GI: Normal bowel sounds, soft, non-distended, non-tender, no guarding  Genitourinary:  Voiding in incontinence briefs.  Skin:  Lower extremities with chronic skin changes. Right hip Aquacell dressing in place.   Musculoskeletal: Right hip pain and tenderness, mild swelling at hip/thigh site, dressing in place. Fascia illiac block in place and infusing. ABductor pillow inbetween legs. Right ankle swelling, some tenderness along lateral aspect of ankle. Remainder of joints without redness, warmth, or swelling of the joints.  Pedal pulse palpated  Neurologic: Awake, alert, oriented, answers questions appropriately. Seems  forgetful at times..  Cranial nerves II-XII are grossly intact.    Neuropsychiatric: Calm, normal eye contact, alert, affect appropriate  to situation.     Guzman Uriostegui PA-C     To contact the trauma service use job code pager [phone],   Numeric texts or alpha text through [facility]

## 2018-07-27 NOTE — DISCHARGE SUMMARY
[facility], [place]    Discharge Summary  Trauma Surgery Service    Date of Admission:  7/25/2018  Date of Discharge:  7/30/2018  Discharging Provider: Eric Farrell NPErosC  Date of Service (when I saw the patient): 07/30/18    Primary Provider: Baron Seth  Primary Care clinic: [address]  [facility] [address]  Phone: [phone]  Fax number: [phone]     Discharge Diagnoses   1. S/p ground level fall 2 weeks prior   2. Traumatic right femoral neck fracture s/p right hemiarthroplasty 7/26/18  3. Acute traumatic pain       Other diagnoses:  1. Stage III anal cancer s/p Chemo and radiation   2. Limited mobility 2/2 # 1   3. Recent catheter related wound due to perianal radiation   4. Hx of subacute right sided rib fractures 8-10 (dx 3/18)  5. Peripheral edema   6. C. Diff diarrhea       Hospital Course  Elizbaeth Taylor is a 78 year old female who presented on 7/26/18 for severe right hip pain after sustaining a ground level fall 2 weeks prior.   She was able to ambulate and weight bear in small limited interval however her mobility was severely limited. She did not seek care immediately as she felt she likely has muscle strain however her pain progressively worsened thus prompting her to present for evaluation where she was found to have a right femoral neck fractures.     Traumatic Injury: Fall   The patient sustained the above injury as a result of ground level mechanical fall 2 weeks prior to her presentation. She was seen by the Trauma Resource Nurse and injury prevention education was performed.  The mechanism of injury and factors contributing to the accident were discussed with the patient.  Strategies on how to prevent future accidents were reviewed.  The patient underwent tertiary examination to evaluate for additional injuries.  The systematic review did not find any other injuries.    Right femoral neck fracture s/p right hemiarthroplasty 7/26/18    Ms. Taylor  was evaluated by Orthopedics and underwent right hemiarthroplasty by Dr Phan on 7/26/18. Please see operative note for details regarding the procedure. She will need Lovenox for DVT prophylaxis for 6 weeks. She is recommended to Weight bearing as tolerated but will need to follow posterior hip precautions for 3 months. She  is requested to follow up in the Orthopedic Clinic in 2 weeks. he  was evaluated by physical and occupational therapies during her hospitalization.  Please see summary of most current therapy notes below.     C. Diff diarrhea: She was noted to have loose stools on day one of her hospital stay. She did not have abdominal pain or fever or leukocytosis but C. Diff PCR was sent and positive. She was started on oral vancomycin for treatment of C. Diff colitis.  She will need 10 day total course.     Acute pain.  She had pain as a result of her right hip fracture. During her hospitalization we achieved pain control with a multi-modality approach. See below for her pain regimen. We anticipate that they will taper off this regimen over the next several weeks..  # Discharge Pain Plan:   - During her hospitalization, Elizabeth experienced pain due to her femur fracture.  The pain plan for discharge was discussed with Elizabeth and the plan was created in a collaborative fashion.    - Opioids prescribed on discharge: oxycodone  - Bowel regimen: not needed  - Pharmacologic adjuvants:  Acetaminophen, Lidocaine patch   - Non-pharmacologic adjuvants: Heat and Ice     Palliative Care Consult  The palliative care team was consulted to assist in the care and future life planning regarding the changes the above injuries have created. Often this sort of injury is a clear marker of general decline in the aged population.  During their time with the patient and family, these are the things they focused on this admission:  discussion of individual needs of patient with new traumatic injuries and life style changes ,  discussion of goals of care: , facilitate processing of healthcare experience and aid in pain management      Other Comobidities:  Stage III anal cancer s/p Chemo and radiation:  She is s/p radiation and chemotherapy which was reported to be completed in March. She should continue to follow up as previously scheduled in colo-rectal clinic for routine anoscopy and evaluation. She should continue to follow up with Dr Ray (oncology) and Dr Madison (radiation oncology) as previously scheduled for routine surveillance.      Recent catheter related wound due to perianal radiation. This was noted back in prior admission. She should continue with local wound cares to prevent further skin injury.     Hx of subacute right sided rib fractures 8-10 (dx 3/18). These were subacute/chronic. She did not express nelli pain or discomfort from this. Continued aggressive pulmonary toileting was recommended.         Therapy Recommendations:   Current status of physical therapies on discharge: Goal: Plan of Care/Patient Progress Review  Discharge Planner PT   Patient plan for discharge: TCU  Current status: PT: Pt seated in chair finishing breakfast after OT session, agreeale to work with PT to return to bed. Pt incont of BM, NST present to A with hygeine. Pt educated on plan, technique for standing, progression to step to turn to bed after, demo provided and extra time to answer detailed questions about positioning etc. Pt transfers sit>stand min A x2 with light mod A when transition hands to walker. Pt able to stand mod A x5 minutes for total A hygeine, PT did need to block L knee toward end due to fatigue. Pt wanted to turn to bed, heavy mod A x2 with pt able to take a few steps but then pt unable to follow different attempts at directing pt to bend at hips to sit, pt very stiff in standing and mod to max A x2 to sit pt on bed, anxiety is a significant factor. Once seated pt needed light min to mod A to scoot to position and light mod  A sit>supine into hooklying position with minimal pain reported during transfer.   Barriers to return to prior living situation: medical and functional status  Recommendations for discharge: TCU    Current status of occupational therapies on discharge: Goal: Plan of Care/Patient Progress Review  Discharge Planner OT   Patient plan for discharge: not stated  Current status: Pt continues to have high anxiety related to pain/activity and benefits from slow pace and reassurance. Pt able to transfer to EOB with mod A of 1-2; min A of 1 to stand and transfer to chair using FWW. Pt able to complete seated ADLs with set-up and SBA.  Barriers to return to prior living situation: weakness, pain, surgical precautions, impaired balance, anxiety  Recommendations for discharge: TCU  Rationale for recommendations: to increase pt's (I) with ADL/IADLs      Significant Results and Procedures   DATE: 7/26/18  Procedure(s): Right Hip Hemiarthroplasty - Wound Class: I-Clean  Surgeon(s): Surgeon(s) and Role:     * Zaire Phan MD - Primary     * Rex Ballard MD - Resident - Assisting    Non-operative procedures   1. RIGHT Fascia Iliaca Catheter--Regional anesthesia pain 7/26/18    Pending Results   Code Status   Full Code  [place] Coma Scale - Total 15/15  Constitutional: Awake, alert, cooperative, no apparent distress, laying in bed. Appear improved from day prior.  Eyes: Lids and lashes normal, pupils equal, round and reactive to light, extra ocular muscles intact, sclera clear, conjunctiva normal.  ENT: Normocephalic, atraumatic  Respiratory: BBS, breathing unlabored, no increased work of breathing, good air exchange, clear to auscultation bilaterally, no crackles or wheezing.  Cardiovascular:  regular rate and rhythm, normal S1 and S2, no S3 or S4, and no murmur noted.  GI: Normal bowel sounds, soft, non-distended, non-tender, no guarding  Genitourinary: voiding per briefs.    Skin:  Normal skin  "color, no redness, warmth, or swelling, no ecchymosis, no abrasions, and no jaundice.  Musculoskeletal: Right hip pain and swelling, Dressing present and intact.MILLS. Calves soft and non-tender, Distal pulses 2+   Neurologic: Awake, alert, oriented x4.  Cranial nerves II-XII are grossly intact.  Strength and sensory is intact.  No focal deficits  Neuropsychiatric: Calm, normal eye contact, alert, affect appropriate to situation.        Discharge Disposition   Discharged to home  Condition at discharge: Stable  Discharge VS: Blood pressure 122/64, pulse 78, temperature 100  F (37.8  C), temperature source Oral, resp. rate 18, height 1.676 m (5' 6\"), weight 47.9 kg (105 lb 9.6 oz), SpO2 92 %, not currently breastfeeding.    Consultations This Hospital Stay   MEDICATION HISTORY IP PHARMACY CONSULT  PHYSICAL THERAPY ADULT IP CONSULT  OCCUPATIONAL THERAPY ADULT IP CONSULT  PALLIATIVE CARE ADULT IP CONSULT  OCCUPATIONAL THERAPY ADULT IP CONSULT  PHYSICAL THERAPY ADULT IP CONSULT  VASCULAR ACCESS CARE ADULT IP CONSULT  LYMPHEDEMA THERAPY IP CONSULT    Discharge Orders   No discharge procedures on file.  Discharge Medications   Current Discharge Medication List      CONTINUE these medications which have NOT CHANGED    Details   acetaminophen (TYLENOL) 325 MG tablet Take 2 tablets (650 mg) by mouth every 4 hours as needed for pain    Associated Diagnoses: Closed fracture of one rib of right side, initial encounter      artificial saliva (BIOTENE DRY MOUTHWASH) LIQD liquid Swish and spit 5-10 mLs in mouth 4 times daily as needed for dry mouth    Associated Diagnoses: Mouth dryness      calcium-vitamin D (CALTRATE) 600-400 MG-UNIT per tablet Take 1 tablet by mouth daily  Qty: 60 tablet    Associated Diagnoses: Age-related osteoporosis with current pathological fracture, initial encounter      diphenoxylate-atropine (LOMOTIL) 2.5-0.025 MG per tablet Take 1 tablet by mouth 4 times daily as needed for diarrhea . If having less " than two bowel movements daily, DO NOT GIVE  Qty: 40 tablet, Refills: 0    Associated Diagnoses: Diarrhea, unspecified type; Malignant neoplasm of anal canal (H)      Hydrocortisone (BUTT PASTE, WITH H.C,) Use topically 3-4 times daily  Qty: 1 Tube, Refills: 1    Comments: Not sure the size but can be a big tub or tube.  Associated Diagnoses: Vaginitis and vulvovaginitis      loperamide (LOPERAMIDE A-D) 2 MG tablet Take 1 tablet (2 mg) by mouth 4 times daily as needed for diarrhea . If having less than two bowel movements daily, DO NOT GIVE  Qty: 60 tablet, Refills: 3    Associated Diagnoses: Malignant neoplasm of anal canal (H)      menthol-zinc oxide (CALMOSEPTINE) 0.44-20.625 % OINT ointment Apply thick layer to irritated perianal skin 4 times per day and after bowel movements.  Qty: 1 Tube, Refills: 3    Associated Diagnoses: Malignant neoplasm of anal canal (H)      methocarbamol (ROBAXIN) 500 MG tablet Take 1 tablet (500 mg) by mouth 2 times daily as needed for muscle spasms  Qty: 120 tablet, Refills: 0    Associated Diagnoses: Closed fracture of one rib of right side, initial encounter      simethicone (MYLICON) 40 MG/0.6ML suspension Take 0.6 mLs (40 mg) by mouth every 6 hours as needed for cramping    Associated Diagnoses: Malignant neoplasm of anal canal (H); Flatulence, eructation and gas pain      traMADol (ULTRAM) 50 MG tablet Take 1 tablet (50 mg) by mouth every 6 hours  Qty: 20 tablet, Refills: 0    Associated Diagnoses: Closed fracture of one rib of right side, initial encounter      vitamin B complex with vitamin C (VITAMIN  B COMPLEX) TABS tablet Take 1 tablet by mouth daily      ondansetron (ZOFRAN-ODT) 8 MG ODT tab Take 1 tablet (8 mg) by mouth every 8 hours as needed For nausea  Qty: 60 tablet    Associated Diagnoses: Malignant neoplasm of anal canal (H)         STOP taking these medications       multivitamin, therapeutic (THERA-VIT) TABS tablet Comments:   Reason for Stopping:              Allergies   Allergies   Allergen Reactions     Darvon [Propoxyphene]      Had reaction in teen years     Penicillins      As a child     Ketoconazole Rash     Data   Most Recent 3 CBC's:  Recent Labs   Lab Test  07/27/18   0759  07/26/18   0652  07/25/18   2130   WBC  7.4  5.7  5.7   HGB  11.3*  11.9  11.5*   MCV  88  87  87   PLT  329  348  351      Most Recent 3 BMP's:  Recent Labs   Lab Test  07/27/18   0759  07/26/18   0652  07/25/18   1815   NA  137  138  138   POTASSIUM  3.9  4.1  3.9   CHLORIDE  102  104  103   CO2  28  26  29   BUN  10  17  19   CR  0.53  0.47*  0.62   ANIONGAP  6  8  6   ELISE  8.6  8.9  9.5   GLC  91  87  83     Most Recent 2 LFT's:  Recent Labs   Lab Test  07/25/18   1815  06/22/18   1134   AST  18  21   ALT  19  20   ALKPHOS  126  131   BILITOTAL  0.4  0.6     Most Recent INR's and Anticoagulation Dosing History:  Anticoagulation Dose History     Recent Dosing and Labs Latest Ref Rng & Units 3/30/2018 3/31/2018 4/1/2018 4/2/2018 4/3/2018 4/4/2018 7/25/2018    INR 0.86 - 1.14 1.28(H) 1.07 1.11 1.11 1.18(H) 1.13 1.05        Most Recent 3 Troponin's:  Recent Labs   Lab Test  03/19/18   1356  03/17/18   1322   TROPI  0.025  0.035     Most Recent 6 Bacteria Isolates From Any Culture (See EPIC Reports for Culture Details):  Recent Labs   Lab Test  07/26/18   1852  04/02/18   1200  03/24/18   0944  03/24/18   0940  03/19/18   2017  03/17/18   1322   CULT  Culture negative monitoring continues  Culture negative monitoring continues  Culture negative after 14 hours  >100,000 colonies/mL  urogenital zulay  Susceptibility testing not routinely done    >100,000 colonies/mL  Candida albicans / dubliniensis  Candida albicans and Candida dubliniensis are not routinely speciated  Susceptibility testing not routinely done  *  No growth  No growth  >100,000 colonies/mL  Klebsiella pneumoniae  *  10,000 to 50,000 colonies/mL  Proteus mirabilis  *  No growth     Most Recent TSH, T4 and A1c  Labs:  Recent Labs   Lab Test  03/17/18   1322   TSH  3.87     Results for orders placed or performed during the hospital encounter of 07/25/18   XR Pelvis w Hip Right G/E 2 Views    Narrative    Exam:  XR PELVIS AND HIP RIGHT 2 VIEWS, 7/25/2018 5:52 PM    History: Trauma;     Comparison:  Pelvis MRI 5/25/2018, PET/CT 5/25/2018.    Findings:  AP and crosstable lateral views of the right hip.  Displaced, foreshortened right femoral neck fracture. Remaining  osseous structures are intact. Low lumbar predominant facet  arthropathy and mild degenerative changes in the left hip. Visualized  soft tissues are unremarkable.      Impression    Impression:    Displaced, foreshortened right femoral neck fracture.    I have personally reviewed the examination and initial interpretation  and I agree with the findings.    SHARON PAREKH MD   XR Femur Right 2 Views    Narrative    XR FEMUR RT 2 VW  7/25/2018 6:45 PM      HISTORY: trauma;     COMPARISON: Earlier today    FINDINGS: Redemonstration of right femoral neck fracture with superior  displacement of the distal fragment. No other fractures identified.      Impression    IMPRESSION: Redemonstration of right femoral neck fracture with  superior displacement of the distal fragment.     I have personally reviewed the examination and initial interpretation  and I agree with the findings.    SHARON PAREKH MD   XR Chest 1 View    Narrative    XR CHEST 1 VW  7/25/2018 6:44 PM      HISTORY: trauma;     COMPARISON: 4/2/2018    FINDINGS: Right-sided Port-A-Cath tip projects over the upper right  atrium. Cardiac silhouette is within normal limits. No pneumothorax.  Bilateral trace pleural effusions. Subacute right-sided rib fractures.  No acute displaced fracture.      Impression    IMPRESSION:   1. Subacute right-sided rib fractures with no acute displaced fracture  identified.  2. Bilateral trace pleural effusions.    I have personally reviewed the examination and  initial interpretation  and I agree with the findings.    SHARON PAREKH MD   US Lower Extremity Venous Duplex Bilateral    Narrative    EXAMINATION: DOPPLER VENOUS ULTRASOUND OF BILATERAL LOWER EXTREMITIES,  7/25/2018 10:03 PM     COMPARISON: None.    HISTORY: Assess for DVT with prolonged immobilization, swelling    TECHNIQUE:  Gray-scale evaluation with compression, spectral flow and  color Doppler assessment of the deep venous system of both legs from  groin to knee, and then at the ankles.    FINDINGS:  In both lower extremities, the common femoral, femoral, popliteal and  posterior tibial veins demonstrate normal compressibility and blood  flow.        Impression    IMPRESSION:   1.  No evidence of deep venous thrombosis in either lower extremity.    I have personally reviewed the examination and initial interpretation  and I agree with the findings.    MENG PARTIDA MD   XR Pelvis Port 1/2 Views    Narrative    This exam was marked as non-reportable because it will not be read by a   radiologist or a [place] non-radiologist provider.             XR Pelvis w Hip Right G/E 2 Views    Narrative    History: Postop and PACU.    COMPARISON: Intraoperative films performed today at 1900 hours.    FINDINGS: Bipolar right hip arthroplasty has been placed with cemented  femoral component. Soft tissue drain about the right thigh along with  prominent soft tissue gas compatible with immediate postoperative  status. Bones appear osteopenic. Rotary curvature of the visualized  lower lumbar spine with prominent facet osteoarthrosis on the right.  The left hip is unremarkable.      Impression    IMPRESSION: Satisfactory postoperative appearance of new right hip  hemiarthroplasty.    PABLO GAMEZ MD   XR Ankle Port Right 2 Views    Narrative    3 views right ankle radiographs 7/27/2018 1:03 PM    History: right ankle swelling. S/p  fall;     Additional History from EMR: Patient fell;  right femoral  neck  fracture. Concern for ankle fracture    Comparison: None    Findings: 2 radiographs, nonstandard projections were obtained.    Patient is severely osteopenic. No obviously displaced fracture  however study is limited by the nonstandard projections and osteopenia  which could severely limits the diagnostic accuracy.     Ankle mortise and syndesmosis are congruent on this non-weight bearing  images.    Soft tissue is unremarkable.      Impression    Impression:  1. Severe osteopenia  2. Study is limited by osteopenia and nonstandard projections which  severely limits the diagnostic accuracy, there is a high clinical suspicion for fracture consider CT or repeat radiographs.        Time Spent on this Encounter   I, Hugo Farrell, personally saw the patient today and spent greater than 30 minutes discharging this patient.    We appreciate the opportunity to care for your patient while in the hospital.  Should you have any questions about their injuries or this discharge summary our contact information is below.    Trauma Services  [facility]   Department of Critical Care and Acute Care Surgery  [address] [place] [id number]  Office: [phone]

## 2018-07-27 NOTE — OP NOTE
DATE OF SURGERY: 7/26/2018    PREOPERATIVE DIAGNOSIS: Subacute Displaced Right Femoral Neck Fracture             POSTOPERATIVE DIAGNOSIS: Same    PROCEDURES:  1. Right HIp Hemiarthroplasty    PRIMARY SURGEON: Zaire Phan MD    FIRST ASSISTANT: Eduardo Falk, PGY4.    ANESTHESIA: General Endotracheal    COMPLICATIONS:  None.    SPECIMENS: None.    ESTIMATED BLOOD LOSS: 100cc    INDICATIONS:                          Elizabeth Taylor is a 78 year old female who elected surgical treatment, and understood the indications for this surgery, as well as its risks, benefits, and alternatives as documented in the pre-operative H&P.  She had a fall several weeks ago.  Since that time she has been nonambulatory.  Eventually her daughter thought that she should come into the hospital where x-rays were obtained which diagnosed her with a displaced right femoral neck fracture.  We discussed risks and benefits and alternatives to surgery.  I felt the main benefit of surgery would be to help her become ambulatory again.  As noted in her H&P note she is very debilitated and is status post recent chemotherapy for malignancy.  Nonetheless she was walking before this injury and I felt it would be reasonable to offer her surgery to try and help her regain her ambulatory status.  Certainly there are risks of surgery including risk of infection, risk of periprosthetic fracture, risk of cement embolization resulting in stroke heart attack or death, loosening of the implant requiring revision in the future.  There is a risk of leg length discrepancy.  In addition there is a risk of ongoing hip or groin pain which might limit her ability to ambulate.  If some of these complications were to occur it might be necessary to convert her to a total hip arthroplasty in the future.  She understood and wished to proceed.      DESCRIPTION OF PROCEDURE:           Elizabeth Taylor was taken to the operating room, where the  Anesthesiology Service performed a spinal anesthetic.  She was then placed into the left lateral decubitus position on a pegboard.  All the pressure points were well-padded.  We then prepped and draped in usual fashion.  We held a multidisciplinary timeout which would verify the correct patient procedure and antibiotic plan.  All team members were in agreement.  She received clindamycin through the IV.    We began by marking out the skin incision and then incised for a length of about 10 cm along the posterior aspect of the right greater trochanter.  We used cautery to go through the fascia and fat and then came down onto the bursa overlying the greater trochanter.  We then internally rotated and removed the capsule and short external rotators.  After going through the capsule we did encounter a gush of yellow appearing fluid.  This looked like bone marrow to me.  We did culture it.  It did not look like infection.  I think because this is a chronic fracture there is simply an accumulation of serous fluid and bone marrow debris.  Nonetheless we did send some cultures.    We cleaned this out tonight and used a Brea tree to remove the femoral head from the acetabulum.  We lightly cleaned out the acetabulum.  We then turned our attention to the femoral preparation.  The femoral neck was delivered.  I freshened up the neck cut with an oscillating saw.  We then used the  osteotome followed by the lateralizing reamer followed by the entry reamer into the canal.  We then broached up to a size 6 trial.  We did try to broach the size 7 but this was simply too large.  We are planning to cement the implants and I felt that trying to force this down would risk of fracture so rather than doing that we undersized a little bit knowing that the cement would fill up the gap.  We left this trial in place and then chose a +5 mm neck with 127  angle and a 47 mm head trial.  This was reduced and I felt that it had good  stability through a wide range of motion.  We then took an intraoperative x-ray.  I noted that the implant had subsided 2 or 3 mm.  She looked like she was short about 1 cm on these films.  Her soft tissues were very tight and it was actually quite hard to reduce this implant which is why I think the implants subsided a little bit.  I think her tissues are tight because this is a chronic fracture and has been this way for a few weeks and so she was starting to get some contractures.  We then dislocated the hip again and I did a little bit of a capsular release to  get us some more excursion.  We then trialed with a plus 10 mm neck and I felt that this had a better fit.  We then removed all the trials.  We thoroughly irrigated the femur.  I then packed in epinephrine soaked lap sponge into the canal wall the cement was prepared on the back table.  We opened our implants which was a size 6 cemented [place] femoral implant, a 47 mm head with a +10 neck, 127  angle.    A cement restrictor was placed into the canal at the appropriate depth.  After thoroughly cleaning and then drying the canal he then used a cement gun to pressurize the canal.  I backfilled the canal continuing to pressurize it with cement.  Once it was fully filled with cement I then placed additional cement around the metaphyseal region of the stem.  The stem was then manually inserted and held under manual pressure being careful to hold rotation and hold still while the cement dried.  I used a [place] to remove some loose cement debris around the edge of the femoral neck.  We held this in place for 15 minutes until the cement was totally solid.    We then irrigated over the trunnion.  The bipolar head was then attached and reduced.  I felt that it had excellent stability up to 90  of flexion, 60  of internal rotation.  She had limited external rotation because of the tightness of the quadriceps muscle.  I think she extended about 10 .  There was no  posterior impingement.  I considered doing soft tissue releases but given her age, cancer status and very low demands I felt that the additional morbidity and operative time were not in the patient's interest.    We then finally irrigated.  We put a Betadine washed through the wound.  This was allowed to sit for 3 minutes and we then irrigated out with a liter of saline.  I then performed a posterior capsular repair with Ethibond suture.  The fascia was then closed with a running 0 PDS and reinforced with 0 Vicryl pops.  The fat layer was closed with 0 Vicryl.  The skin was closed with 2-0 Monocryl 3-0 Monocryl Dermabond.    Postoperatively she can weight-bear as tolerated.  She will be admitted back to the medicine service and we will defer DVT prophylaxis to their recommendations given her multiple comorbidities.    She should follow-up with me in 6 weeks with standing AP pelvis and lateral right femur films.    I was present and scrubbed for the entire case.    Zaire Phan MD

## 2018-07-27 NOTE — PROGRESS NOTES
Orthopaedic Surgery Progress Note:       Subjective:   NAEO. Pain is controlled. Now on enteric precautions. Yet to mobilize. Denies fever/chills/SOB/nause/vomiting.     Objective:   Temp:  [96  F (35.6  C)-97.4  F (36.3  C)] 96.8  F (36  C)  Heart Rate:  [61-95] 85  Resp:  [7-24] 19  BP: (102-179)/() 107/57  SpO2:  [93 %-100 %] 95 %    Intake/Output Summary (Last 24 hours) at 07/27/18 0753  Last data filed at 07/27/18 0720   Gross per 24 hour   Intake             1684 ml   Output              780 ml   Net              904 ml       Gen: NAD. Resting comfortably in bed  Resp: Breathing comfortably on NC  RLE:  Dressing/ACE is c/d/i. Abduction pillow in place.  SILT in femoral, saphenous, sural, deep peroneal, superficial peroneal, and tibial n dist.   Fires EHL/FHL/TA/Gastroc with 4/5 strength    DP pulses intact, 2+ and foot is wwp    Labs:    Recent Labs  Lab 07/26/18  0652 07/25/18  2130 07/25/18  1815   WBC 5.7 5.7 6.3   HGB 11.9 11.5* 12.0    351 371        Assessment & Plan:   Post-Op Plan:  Assessment/Plan: Elizabeth Taylor is a 78 year old female with right femoral neck fracture now s/p right hip hemiarthroplasty on 7/26 with Dr. Phan     Trauma Primary  Activity: Up with assist and assistive devices as needed until independent. Posterior hip precautions x 3 months.   Weight bearing status: WBAT  ABD Pillow at all times when at rest and in bed for 6 weeks  Antibiotics: Clinda x 24 hours. -- C diff positive; abx per primary team  Diet: adat. Bowel regimen. Anti-emetics PRN.   DVT prophylaxis: Lovenox 30mg Daily x 4 weeks  Wound Care: Aquacel x 7 days.    Pain management: Orals as needed. IV for breakthrough pain only.   X-rays: AP Pelvis and Lateral R hip in PACU. Completed  Physical Therapy: Mobilization, ROM, ADL's, Posterior hip precautions.    Occupational Therapy: ADL's  Labs: Trend Hgb on POD #1, 2.   Consults: PT, OT. Medicine, SW/CC  Follow-up: Clinic in 2 weeks for wound  check     Disposition: Pending progress with therapies, pain control on orals, and medical stability, anticipate discharge to TCU/ARU on POD #2-3.        Rex Ballard MD   Orthopaedic Surgery Resident, PGY-4  Pager: [phone]

## 2018-07-27 NOTE — BRIEF OP NOTE
[facility], [place]    Brief Operative Note    Pre-operative diagnosis: Right Femoral Neck Fracture  Post-operative diagnosis * No post-op diagnosis entered *  Procedure: Procedure(s):  Right Hip Hemiarthroplasty - Wound Class: I-Clean  Surgeon: Surgeon(s) and Role:     * Zaire Phan MD - Primary     * Rex Ballard MD - Resident - Assisting  Anesthesia: General   Estimated blood loss: Less than 100 ml  Drains: None  Specimens:   ID Type Source Tests Collected by Time Destination   1 : Right Hip Wound Hip, Right ANAEROBIC BACTERIAL CULTURE, FUNGUS CULTURE, GRAM STAIN, WOUND CULTURE AEROBIC BACTERIAL Zaire Phan MD 7/26/2018  6:52 PM      Findings:   None.  Complications: None.  Implants: Diomedes Bipolar Ancelmo Arthroplasty    Post-Op Plan:  Assessment/Plan: Elizabeth Taylor is a 78 year old female with right femoral neck fracture now s/p right hip hemiarthroplasty on 7/26 with Dr. Phan    Medicine Primary  Activity: Up with assist and assistive devices as needed until independent. Posterior hip precautions x 3 months.   Weight bearing status: WBAT  ABD Pillow at all times when at rest and in bed for 6 weeks  Antibiotics: Clinda x 24 hours.  Diet: Begin with clear fluids and progress diet as tolerated. Bowel regimen. Anti-emetics PRN.   DVT prophylaxis: Lovenox 30mg Daily x 4 weeks  Wound Care: Aquacel x 7 days.    Pain management: Orals as needed. IV for breakthrough pain only.   X-rays: AP Pelvis and Lateral R hip in PACU.   Physical Therapy: Mobilization, ROM, ADL's, Posterior hip precautions.    Occupational Therapy: ADL's  Labs: Trend Hgb on POD #1, 2.   Consults: PT, OT. Medicine, appreciate assistance in caring for this patient.   Follow-up: Clinic in 2 weeks for wound check    Disposition: Pending progress with therapies, pain control on orals, and medical stability, anticipate discharge to TCU/ARU on POD #2-3.      Rex  MD Elio   Orthopaedic Surgery Resident, PGY-4  Pager: [phone]

## 2018-07-27 NOTE — ANESTHESIA CARE TRANSFER NOTE
Patient: Elizabeth Taylor    Procedure(s):  Right Hip Hemiarthroplasty - Wound Class: I-Clean    Diagnosis: Right Femoral Neck Fracture  Diagnosis Additional Information: No value filed.    Anesthesia Type:   Spinal, MAC     Note:  Airway :Nasal Cannula  Patient transferred to:PACU  Comments: Anesthesia Care Transfer Note      Transfer to:  PACU    Patient Vital Signs:  Stable    Airway:  None    Patient transported to PACU with supplemental O2.  Patient alert and breathing comfortably.  VSS.  Care transferred with report to PACU RN.    Franklin Muñoz CRNAHandoff Report: Identifed the Patient, Identified the Reponsible Provider, Reviewed the pertinent medical history, Discussed the surgical course, Reviewed Intra-OP anesthesia mangement and issues during anesthesia, Set expectations for post-procedure period and Allowed opportunity for questions and acknowledgement of understanding      Vitals: (Last set prior to Anesthesia Care Transfer)    CRNA VITALS  7/26/2018 1958 - 7/26/2018 2036 7/26/2018             Pulse: 60    SpO2: 100 %                Electronically Signed By: GRAHAM Philip CRNA  July 26, 2018  8:36 PM

## 2018-07-27 NOTE — ANESTHESIA POSTPROCEDURE EVALUATION
Patient: Elizabeth Taylor    Procedure(s):  Right Hip Hemiarthroplasty - Wound Class: I-Clean    Diagnosis:Right Femoral Neck Fracture  Diagnosis Additional Information: No value filed.    Anesthesia Type:  Spinal, MAC    Note:  Anesthesia Post Evaluation    Patient location during evaluation: PACU  Patient participation: Able to fully participate in evaluation  Level of consciousness: awake  Pain management: adequate  Airway patency: patent  Cardiovascular status: acceptable  Respiratory status: acceptable  Hydration status: acceptable  PONV: none             Last vitals:  Vitals:    07/26/18 1222 07/26/18 2045 07/26/18 2100   BP: 134/62 102/84    Pulse:      Resp: 9 10 13   Temp: 35.7  C (96.3  F) 35.7  C (96.3  F)    SpO2: 93% 98%          Electronically Signed By: Aleksandar Leach MD  July 26, 2018  9:10 PM

## 2018-07-27 NOTE — PROGRESS NOTES
REGIONAL ANESTHESIA PAIN SERVICE CONTINUOUS NERVE INFUSION NOTE  Elizabeth Taylor is a 78 year old female POD #1 s/p ARTHROPLASTY HIP  ANESTHESIA OUT OF OR and placement of right fascia iliaca catheter for pain control.    SUBJECTIVE:  Interval History: Overnight no acute events.  Patient awake, reports it is difficult for her to move right foot and ankle, good pain control with nerve block continuous infusion and current analgesics (see below).  denies paresthesias, circumoral numbness, metallic taste or tinnitus. Patient has not been OOB yet.  Currently denies nausea or vomiting; feels hungry, tolerating diet as ordered.       Clinically Aligned Pain Assessment (CAPA):   Comfort (How is your pain?): Comfortably manageable  Change in Pain (Since your last medication/intervention?): About the same  Pain Control (How are your pain treatments working?):  Fully effective pain control  Functioning (Are you able to do activities to get better?) : Can do most things, but pain gets in the way of some        Numerical Rating Scale (NRS):  3/10 at rest and 5/10 with activity      Antithrombotic/Thrombolytic Therapy ordered:    enoxaparin (LOVENOX) injection 30 mg 30 mg, SC, Q24H Given: 07/27 1035         Medications related to Pain Management (Future)    Start     Dose/Rate Route Frequency Ordered Stop    07/26/18 2159  lidocaine 1 % 1 mL      1 mL Other EVERY 1 HOUR PRN 07/26/18 2159 07/26/18 2159  lidocaine (LMX4) cream       Topical EVERY 1 HOUR PRN 07/26/18 2159 07/26/18 1015  ROPivacaine 0.2% (NAROPIN) 750 mL in ON-Q C-Bloc select flow ([id number] holds 600-750 mL) single cath disposable pump      8 mL/hr  8 mL/hr  Irrigation Elastomeric Pump 07/26/18 1008      07/26/18 0000  acetaminophen (TYLENOL) tablet 1,000 mg      1,000 mg Oral EVERY 8 HOURS 07/25/18 2309 07/26/18 0000  Lidocaine (LIDOCARE) 4 % Patch 1 patch      1 patch  over 12 Hours Transdermal EVERY 24 HOURS 2000 07/25/18 2309      07/26/18 0000   lidocaine patch in PLACE       Transdermal EVERY 8 HOURS 07/25/18 2309 07/25/18 2309  methocarbamol (ROBAXIN) tablet 500 mg      500 mg Oral 2 TIMES DAILY PRN 07/25/18 2309 07/25/18 2309  oxyCODONE IR (ROXICODONE) half-tab 2.5-5 mg      2.5-5 mg Oral EVERY 3 HOURS PRN 07/25/18 2309 07/25/18 2309  polyethylene glycol (MIRALAX/GLYCOLAX) Packet 17 g      17 g Oral DAILY PRN 07/25/18 2309 07/25/18 2309  HYDROmorphone (DILAUDID) injection 0.2 mg      0.2 mg Intravenous EVERY 2 HOURS PRN 07/25/18 2309 07/25/18 2309  bisacodyl (DULCOLAX) Suppository 10 mg      10 mg Rectal DAILY PRN 07/25/18 2309             OBJECTIVE:  Lab results  Recent Labs   Lab Test  07/27/18   0759   WBC  7.4   RBC  3.98   HGB  11.3*   HCT  35.0   MCV  88   MCH  28.4   MCHC  32.3   RDW  13.8   PLT  329       Lab Results   Component Value Date    INR 1.05 07/25/2018    INR 1.13 04/04/2018    INR 1.18 04/03/2018         Vitals:    Temp:  [96  F (35.6  C)-97.4  F (36.3  C)] 96.8  F (36  C)  Heart Rate:  [61-98] 98  Resp:  [7-24] 18  BP: (102-179)/() 122/64  SpO2:  [92 %-100 %] 92 %    Exam:   GEN: alert and no distress  NEURO/MSK: Extent of sensory block tested with sharp tip: R) L3-S1  Strength LLE 5/5, RLE weakness consistent with fascia iliaca compartment block (FICB)  SKIN: right fascia iliaca catheter site with dressing c/d/i, no tenderness, erythema, heme, edema      ASSESSMENT/PLAN:    Patient is receiving adequate analgesia with current multimodal therapy including right fascia iliaca catheter infusion Ropivacaine 0.2% at 8 mL/hour. Motor function consistent with nerve block and adequate sensory block, is meeting activity goals.  No evidence of adverse side effects related to local anesthetic.     - continue current Ropivacaine 0.2% infusion rate 8 mL/hour  - expected change of next On-Q pump is 2-3 days  - antithrombotic/thrombolytic therapy okay to administer enoxaparin subQ as ordered. Please contact [facility]  (#[phone]) prior to any medication changes  - will continue to follow and adjust as needed    - discussed plan with attending anesthesiologist    GRAHAM [facility]o [facility]  Regional Anesthesia Pain Service  7/27/2018 9:01 AM    [facility] Contact Info (24 hour job code pager is the last 4 digits) For in-house use only:   [email or web address] phone: [place] [phone], [place] [phone], Peds [phone], then enter call-back number.    Text: Use [employer] on the Intranet <Paging/Directory> tab and enter Jobcode ID.   If no call back at any time, contact the hospital  and ask for [facility]APS attending or backup

## 2018-07-27 NOTE — CONSULTS
"[facility], [place]  Palliative Care Consultation Note    Patient: Elizabeth Taylor  Date of Admission:  7/25/2018    Requesting provider/team: Guzman Uriostegui PA-C with Trauma  Reason for consult: Routine Geriatric Trauma    Recommendations:  -Hydromorphone 0.2 mg IV one time dose ordered for uncontrolled pain   -Encouraged use of Robaxin for muscle spasms in left thigh (chronic issue she uses muscle relaxants for at home)   -Palliative care will follow up on Monday to explore goals of care      These recommendations have been discussed with Guzman Uriostegui PA-C.    Thank you for the opportunity to participate in the care of this patient and family. Our team will follow. Please feel free to contact the on-call Palliative provider with any urgent needs.    GRAHAM Srivastava CNP  Palliative Care Consult Team  Pager: [phone]    [facility] Inpatient Team Consult pager [phone] (M-F 8-4:30)  After-hours Answering Service [phone]   Palliative Clinic: [phone]     75 minutes spent with patient, with >50% counseling and in care coordination.    Assessment:  Elizabeth Taylor is a 78 year old female with history of anal cancer s/p chemoradiation admitted with a right femoral neck fracture following a mechanical fall.     I met with Elizabeth today to introduce the Palliative Care team and services we provide; such as symptom management, assistance navigating chronic illness and goals for treatment, counseling, psychosocial and spiritual support. She was in quite a bit of pain, and was quite distressed by \"the whole experience\" of being in the hospital and having surgery.     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 The fall was about two weeks ago and she continued to bear weight and move around with the help of family, until the pain worsened a couple of days ago. She presented to the ED on 7/25 with pain. History of falls with previous rib fractures and hemopneumothorax. Underwent right hip hemiarthroplasty on 7/26 with orthopedics.     Elizabeth is known to palliative SW from her admission in March for a fall at home (resulted in the rib fractures noted above).     ROS:  A comprehensive ROS has been negative other than stated in the HPI and below:   Palliative Symptom Review (0=no symptom/no concern, 1=mild, 2=moderate, 3=severe):  Pain: 3 -- right hip is the worst, and left thigh spasms are a chronic issue  Fatigue: 3  Nausea: 0  Anxiety: 2  Drowsiness: 0  Poor Appetite: 0       Past Medical History:   Past Medical History:   Diagnosis Date     Anal cancer (H)      Endometriosis, site unspecified      Thyroiditis, unspecified     Thryoiditis-resolved          Past Surgical History:   Past Surgical History:   Procedure Laterality Date     APPENDECTOMY      as a child     COLONOSCOPY N/A 4/13/2017    Procedure: COLONOSCOPY;  Surgeon: Juan Dos Santos MD;  Location: [facility]     INSERT PORT VASCULAR ACCESS Right 2/8/2018    Procedure: INSERT PORT VASCULAR ACCESS;  Place Single Lumen Venous Chest Port Placement;  Surgeon: Zaire Moreira PA-C;  Location: [facility] OR     SURGICAL HISTORY OF -       endometriosis             Family History:   Family History   Problem Relation Age of Onset     Cancer Sister      Cancer Maternal Grandmother      lymphoma     HEART  DISEASE Father      MI     Uterine Cancer Niece            Allergies:   Allergies   Allergen Reactions     Darvon [Propoxyphene]      Had reaction in teen years     Penicillins      As a child     Ketoconazole Rash            Medications:   I have reviewed this patient's medication profile and medications given in the past 24 hours.           Physical Exam:   Vital Signs: Temp: 96.8  F (36  C) Temp src: Oral BP: 122/64   Heart Rate: 98 Resp: 18 SpO2: 92 % O2 Device: None (Room air) Oxygen Delivery: 5 LPM  Weight: 105 lbs 9.6 oz    Constitutional: Elderly female, seen lying in hospital bed, in no acute distress.  Head: Normocephalic. Atraumatic. MMM.   Extremities: Warm and well perfused. 2+ BLE edema.  Pulm: Non-labored breathing. Speaking in complete sentences.    Musculoskeletal: Hip abductor in place.   Skin: Right hip surgical dressing C,D,I. No rashes.   Neuro: A/O x 4. Face symmetrical. PERRL. EOMI.   Psych: Speech clear. Memory and insight intact.       Data reviewed:     CMP  Recent Labs  Lab 07/27/18 0759 07/26/18  0652 07/25/18  1815    138 138   POTASSIUM 3.9 4.1 3.9   CHLORIDE 102 104 103   CO2 28 26 29   ANIONGAP 6 8 6   GLC 91 87 83   BUN 10 17 19   CR 0.53 0.47* 0.62   GFRESTIMATED >90 >90 >90   GFRESTBLACK >90 >90 >90   ELISE 8.6 8.9 9.5   MAG 1.8 2.0  --    PHOS 2.9 3.1  --    PROTTOTAL  --   --  7.4   ALBUMIN  --   --  3.2*   BILITOTAL  --   --  0.4   ALKPHOS  --   --  126   AST  --   --  18   ALT  --   --  19     CBC  Recent Labs  Lab 07/27/18  0759 07/26/18  0652 07/25/18  2130 07/25/18  1815   WBC 7.4 5.7 5.7 6.3   RBC 3.98 4.23 4.11 4.24   HGB 11.3* 11.9 11.5* 12.0   HCT 35.0 36.8 35.9 37.2   MCV 88 87 87 88   MCH 28.4 28.1 28.0 28.3   MCHC 32.3 32.3 32.0 32.3   RDW 13.8 14.0 14.0 14.0    348 351 371     INR  Recent Labs  Lab 07/25/18  1815   INR 1.05

## 2018-07-28 ENCOUNTER — APPOINTMENT (OUTPATIENT)
Dept: PHYSICAL THERAPY | Facility: CLINIC | Age: 78
DRG: 470 | End: 2018-07-28
Payer: MEDICARE

## 2018-07-28 ENCOUNTER — APPOINTMENT (OUTPATIENT)
Dept: OCCUPATIONAL THERAPY | Facility: CLINIC | Age: 78
DRG: 470 | End: 2018-07-28
Payer: MEDICARE

## 2018-07-28 LAB
BACTERIA SPEC CULT: NO GROWTH
ERYTHROCYTE [DISTWIDTH] IN BLOOD BY AUTOMATED COUNT: 14.3 % (ref 10–15)
HCT VFR BLD AUTO: 34.3 % (ref 35–47)
HGB BLD-MCNC: 10.8 G/DL (ref 11.7–15.7)
MAGNESIUM SERPL-MCNC: 2 MG/DL (ref 1.6–2.3)
MCH RBC QN AUTO: 28 PG (ref 26.5–33)
MCHC RBC AUTO-ENTMCNC: 31.5 G/DL (ref 31.5–36.5)
MCV RBC AUTO: 89 FL (ref 78–100)
PLATELET # BLD AUTO: 283 10E9/L (ref 150–450)
POTASSIUM SERPL-SCNC: 3.8 MMOL/L (ref 3.4–5.3)
RBC # BLD AUTO: 3.86 10E12/L (ref 3.8–5.2)
SPECIMEN SOURCE: NORMAL
WBC # BLD AUTO: 8.5 10E9/L (ref 4–11)

## 2018-07-28 PROCEDURE — 40000133 ZZH STATISTIC OT WARD VISIT: Performed by: OCCUPATIONAL THERAPIST

## 2018-07-28 PROCEDURE — 99233 SBSQ HOSP IP/OBS HIGH 50: CPT | Performed by: PHYSICIAN ASSISTANT

## 2018-07-28 PROCEDURE — 40000193 ZZH STATISTIC PT WARD VISIT

## 2018-07-28 PROCEDURE — 12000008 ZZH R&B INTERMEDIATE UMMC

## 2018-07-28 PROCEDURE — 97165 OT EVAL LOW COMPLEX 30 MIN: CPT | Mod: GO | Performed by: OCCUPATIONAL THERAPIST

## 2018-07-28 PROCEDURE — 25000132 ZZH RX MED GY IP 250 OP 250 PS 637: Mod: GY | Performed by: PHYSICIAN ASSISTANT

## 2018-07-28 PROCEDURE — 25000128 H RX IP 250 OP 636: Performed by: SURGERY

## 2018-07-28 PROCEDURE — 25000128 H RX IP 250 OP 636: Performed by: PHYSICIAN ASSISTANT

## 2018-07-28 PROCEDURE — 85027 COMPLETE CBC AUTOMATED: CPT | Performed by: PHYSICIAN ASSISTANT

## 2018-07-28 PROCEDURE — 25000132 ZZH RX MED GY IP 250 OP 250 PS 637: Mod: GY | Performed by: SURGERY

## 2018-07-28 PROCEDURE — A9270 NON-COVERED ITEM OR SERVICE: HCPCS | Mod: GY | Performed by: SURGERY

## 2018-07-28 PROCEDURE — 40000894 ZZH STATISTIC OT IP EVAL DEFER: Performed by: OCCUPATIONAL THERAPIST

## 2018-07-28 PROCEDURE — 97530 THERAPEUTIC ACTIVITIES: CPT | Mod: GO | Performed by: OCCUPATIONAL THERAPIST

## 2018-07-28 PROCEDURE — 84132 ASSAY OF SERUM POTASSIUM: CPT | Performed by: PHYSICIAN ASSISTANT

## 2018-07-28 PROCEDURE — 36415 COLL VENOUS BLD VENIPUNCTURE: CPT | Performed by: PHYSICIAN ASSISTANT

## 2018-07-28 PROCEDURE — A9270 NON-COVERED ITEM OR SERVICE: HCPCS | Mod: GY | Performed by: PHYSICIAN ASSISTANT

## 2018-07-28 PROCEDURE — 83735 ASSAY OF MAGNESIUM: CPT | Performed by: PHYSICIAN ASSISTANT

## 2018-07-28 PROCEDURE — 97530 THERAPEUTIC ACTIVITIES: CPT | Mod: GP

## 2018-07-28 RX ADMIN — Medication 5 MG: at 21:16

## 2018-07-28 RX ADMIN — SODIUM CHLORIDE, POTASSIUM CHLORIDE, SODIUM LACTATE AND CALCIUM CHLORIDE 1000 ML: 600; 310; 30; 20 INJECTION, SOLUTION INTRAVENOUS at 00:09

## 2018-07-28 RX ADMIN — ACETAMINOPHEN 1000 MG: 500 TABLET, FILM COATED ORAL at 08:48

## 2018-07-28 RX ADMIN — ACETAMINOPHEN 1000 MG: 500 TABLET, FILM COATED ORAL at 00:09

## 2018-07-28 RX ADMIN — VANCOMYCIN HYDROCHLORIDE 125 MG: KIT at 23:22

## 2018-07-28 RX ADMIN — VANCOMYCIN HYDROCHLORIDE 125 MG: KIT at 17:52

## 2018-07-28 RX ADMIN — ENOXAPARIN SODIUM 30 MG: 30 INJECTION SUBCUTANEOUS at 09:21

## 2018-07-28 RX ADMIN — Medication: at 08:59

## 2018-07-28 RX ADMIN — VANCOMYCIN HYDROCHLORIDE 125 MG: KIT at 14:09

## 2018-07-28 RX ADMIN — VANCOMYCIN HYDROCHLORIDE 125 MG: KIT at 08:49

## 2018-07-28 RX ADMIN — ACETAMINOPHEN 1000 MG: 500 TABLET, FILM COATED ORAL at 17:52

## 2018-07-28 RX ADMIN — Medication 5 MG: at 09:10

## 2018-07-28 RX ADMIN — Medication 5 MG: at 00:09

## 2018-07-28 RX ADMIN — DICLOFENAC EPOLAMINE 1 PATCH: 0.01 PATCH TOPICAL at 08:48

## 2018-07-28 RX ADMIN — Medication 5 MG: at 14:09

## 2018-07-28 RX ADMIN — DICLOFENAC EPOLAMINE 1 PATCH: 0.01 PATCH TOPICAL at 21:19

## 2018-07-28 RX ADMIN — Medication 5 MG: at 04:30

## 2018-07-28 RX ADMIN — Medication 1 TABLET: at 08:48

## 2018-07-28 RX ADMIN — Medication: at 20:30

## 2018-07-28 RX ADMIN — POTASSIUM CHLORIDE 20 MEQ: 750 TABLET, EXTENDED RELEASE ORAL at 17:53

## 2018-07-28 RX ADMIN — B-COMPLEX W/ C & FOLIC ACID TAB 1 MG 1 TABLET: 1 TAB at 08:49

## 2018-07-28 RX ADMIN — THERA TABS 1 TABLET: TAB at 08:48

## 2018-07-28 RX ADMIN — Medication: at 08:57

## 2018-07-28 ASSESSMENT — ACTIVITIES OF DAILY LIVING (ADL)
ADLS_ACUITY_SCORE: 10

## 2018-07-28 ASSESSMENT — VISUAL ACUITY: OU: GLASSES

## 2018-07-28 NOTE — PROGRESS NOTES
Orthopaedic Surgery Progress Note:       Subjective:   NAEO. Pain is controlled on oral meds. Denies fever/chills/SOB/chest pain/nause/vomiting/new numbness/tinging.    Objective:   Temp:  [97.6  F (36.4  C)-100  F (37.8  C)] 97.6  F (36.4  C)  Heart Rate:  [89-98] 89  Resp:  [16-18] 16  BP: (104-130)/(52-56) 130/52  SpO2:  [93 %-96 %] 94 %    Intake/Output Summary (Last 24 hours) at 07/27/18 0753  Last data filed at 07/27/18 0720   Gross per 24 hour   Intake             1684 ml   Output              780 ml   Net              904 ml       Gen: NAD. Resting comfortably in bed  Resp: Breathing comfortably on NC  RLE:  aquacel is c/d/i, scant drainage (2-4mm). Abduction pillow in place.  SILT in femoral, saphenous, sural, deep peroneal, superficial peroneal, and tibial n dist.   Fires EHL/FHL/TA/Gastroc with 4/5 strength    foot is wwp    Labs:    Recent Labs  Lab 07/28/18  0716 07/27/18  0759 07/26/18  0652   WBC 8.5 7.4 5.7   HGB 10.8* 11.3* 11.9    329 348     7/26 OR cultures - NGTD     Assessment & Plan:   Post-Op Plan:  Assessment/Plan: Elizabeth Taylor is a 78 year old female with right femoral neck fracture now s/p right hip hemiarthroplasty on 7/26 with Dr. Phan.    7/27 - C. Diff positive.     Trauma Primary  Activity: Up with assist and assistive devices as needed until independent. Posterior hip precautions x 3 months.   Weight bearing status: WBAT  ABD Pillow at all times when at rest and in bed for 6 weeks  Antibiotics: Clinda x 24 hours. -- C diff positive; abx per primary team  Diet: adat. Bowel regimen. Anti-emetics PRN.   DVT prophylaxis: Lovenox 30mg Daily x 4 weeks  Wound Care: Aquacel x 7 days.    Pain management: Orals as needed. IV for breakthrough pain only.   X-rays: AP Pelvis and Lateral R hip in PACU. Completed  Physical Therapy: Mobilization, ROM, ADL's, Posterior hip precautions.    Occupational Therapy: ADL's  Labs: Trend Hgb on POD #1, 2.   Consults: PT, OT. Medicine,  SW/CC  Follow-up: Clinic in 2 weeks for wound check     Disposition: Will re-examine ankle tomorrow morning, if no pain will sign off, if medically stable anticipate discharge to TCU/ARU on POD #3.        Assessment and plan discussed with Dr. Phan.    Boris Cheng MD  Orthopaedic Surgery Resident, PGY1   Pager: [phone]

## 2018-07-28 NOTE — PROGRESS NOTES
[facility], [place]  Trauma Service Progress Note    Date of Service (when I saw the patient): 07/28/2018     Assessment & Plan    Trauma Mechanism:Ground level fall 2 weeks prior to presentation   Known Injuries:  1. Traumatic right femoral neck fracture   2. Acute traumatic pain   3. Right ankle swelling, imaging negative       Other diagnoses:  1. Stage III anal cancer s/p Chemo and radiation   2. Limited mobility 2/2 # 1   3. Recent catheter related wound due to perianal radiation   4. Hx of subacute right sided rib fractures 8-10 (dx 3/18)  5. Peripheral edema   6. C. Diff diarrhea       Procedure(s):  1.RIGHT Fascia Iliaca Catheter--Regional anesthesia pain 7/26/18  2. RIGHT hemiarthroplasty--Dr Phan 7/26/18      Plan:  1. Trauma   1. TRN to do fall teaching and trauma education.   2. UA/UC negative.   3. X-ray of right ankle negative for acute fracture. Ter negative for additional injuries.   2. Pain control: oral/IV medications, scheduled tylenol, PRN Low dose opioids, cautious uses of pain medications given hx of AMS with narcotics per last admission (pt received concurrent tincture of opium and oral narcotics pain medications). flector patch.  3. Appreciate regional pain team following. Fascia block management deferred to anesthesia. Plan to keep for now. Will need removal prior to discharge  4. Appreciate orthopedics following.   1. Wound cares per orthopedics. Ice to right hip   2. Posterior hip precautions x3 mos. Abductor pillow x6 weeks per orthopedics   3. WBAT to RLE  4. F/U clinic in 2 weeks for wound check with Dr Phan or team.   5. Diet as she tolerates. Encourage oral intake. Discontinue IVF today.   6. Palliative care consulted. Appreciate thoughts and cares   7. Physical and occupational therapy consults. OOB. Mobilize as able   8. C. Diff--discontinue IV flagyl, will start oral Vancomycin 125 mg QID per IDSA guidelines.  Will discontinue senna/colace for now.  Monitor closely. No fever, leukocytosis or abdominal pain.   9. Local cares to periarea to prevent skin injury.   10. Home medications reviewed and appropriate medications resumed  11. Social work consult for placement and discharge planning. Needs TCU--pt would like to return to [facility] upon discharge. SW/CC aware.       General Cares:   PPI/H2 blocker:  None indicated   DVT prophylaxis: Lovenox 30 mg subcutaneous   Bowel Regimen/Date of last stool: 7/27/18 x3   Pulmonary toilet:  IS and acapella   Lines / drains: no farias per pt request.     Code status: full code    Disposition: inpatient for now, anticipate discharge  POD # 3 or 4    ETOH: see admission note        Discharge goals:     Adequate pain management:  No     VSS x24 hours:  Yes     Hemoglobin stable x 48 hours: yes    Ambulating safely and/or therapy evals complete:  No     Drains/lines removed or plan in place to manage:  Right hip block, PIV's     Teaching done:  Yes   Expected D/C date: 7/30 pending improvement     Interval History  Course reviewed. No acute events overnight. Pain appears to be improved today. Patient reports having better day. Denies fever, chill or sweats. Denies nausea or vomiting. Eating and drinking improved.     ROS x 8 negative with exception of those things listed in interval hx    Physical Exam   Temp: 97.6  F (36.4  C) Temp src: Oral BP: 130/52   Heart Rate: 89 Resp: 16 SpO2: 94 % O2 Device: None (Room air)    Vitals:    07/25/18 2310   Weight: 47.9 kg (105 lb 9.6 oz)     Vital Signs with Ranges  Temp:  [97.6  F (36.4  C)-100  F (37.8  C)] 97.6  F (36.4  C)  Heart Rate:  [89-98] 89  Resp:  [16-18] 16  BP: (104-130)/(52-56) 130/52  SpO2:  [93 %-96 %] 94 %  I/O last 3 completed shifts:  In: 2823.3 [P.O.:1150; I.V.:1673.3]  Out: 900 [Urine:900]  # Pain Assessment:  Current Pain Score 7/28/2018   Patient currently in pain? yes   Pain score (0-10) -   Pain location Hip   Pain descriptors Aching;Dull   - Elizabeth is  experiencing pain due to right hip fracture . Pain management was discussed and the plan was created in a collaborative fashion.  Elizabeth's response to the current recommendations: mixed response  engaged  - Please see the plan for pain management as documented above    [place] Coma Scale - Total 15/15  Constitutional: Awake, alert, cooperative, no apparent distress, laying in bed. Appear improved from day prior.  Eyes: Lids and lashes normal, pupils equal, round and reactive to light, extra ocular muscles intact, sclera clear, conjunctiva normal.  ENT: Normocephalic, atraumatic  Respiratory: BBS, breathing unlabored, no increased work of breathing, good air exchange, clear to auscultation bilaterally, no crackles or wheezing.  Cardiovascular:  regular rate and rhythm, normal S1 and S2, no S3 or S4, and no murmur noted.  GI: Normal bowel sounds, soft, non-distended, non-tender, no guarding  Genitourinary: voiding per briefs.    Skin:  Normal skin color, no redness, warmth, or swelling, no ecchymosis, no abrasions, and no jaundice.  Musculoskeletal: Right hip pain and swelling, Dressing present and intact.MILLS. Calves soft and non-tender, Distal pulses 2+   Neurologic: Awake, alert, oriented x4.  Cranial nerves II-XII are grossly intact.  Strength and sensory is intact.  No focal deficits  Neuropsychiatric: Calm, normal eye contact, alert, affect appropriate to situation.     Guzman Uriostegui PA-C  To contact the trauma service use job code pager [phone],   Numeric texts or alpha text through [facility]

## 2018-07-28 NOTE — PLAN OF CARE
"Problem: Patient Care Overview  Goal: Plan of Care/Patient Progress Review  Outcome: No Change  /56 (BP Location: Right arm)  Pulse 78  Temp 99.1  F (37.3  C) (Oral)  Resp 18  Ht 1.676 m (5' 6\")  Wt 47.9 kg (105 lb 9.6 oz)  SpO2 93%  BMI 17.04 kg/m2    AVSS.  Pt c/o of right hip pain, PRN oral oxycodone given with relief.  Tolerating regular diet, denies n/v.  Turned q 2 for repositioning.  Abduction pillow on at all times.  LR infusing at 50 ml/hr.  On Q pump infusing 8 ml/hr, site CDI.  R hip dressing CDI.  CMS intact. Voiding with bedpan. +BS, +flauts, had 2 loose BMs during shift. Continue POC.       "

## 2018-07-28 NOTE — PROGRESS NOTES
"Social Work Services Progress Note    Hospital Day: 4  Date of Initial Social Work Evaluation:  Not compelted  Collaborated with:  Pt, [facility]    Data:  Chart review indicates, Elizabeth Taylor is a 78 year old female with right femoral neck fracture now s/p right hip hemiarthroplasty on 7/26 with Dr. Phan      Intervention:  Chart review performed which indicates the pt may be out of STR days. SW met with the pt and introduced self and role. We discussed her potential DC on Monday and that the [facility] would be happy to have her there again. She states she would like to return there as well.    Conversation about Medicare coverage with the pt. She asked me to call her sister Sahil and discuss details with her instead. I briefly explained the 80% coverage for days  and that she would have a new set of 20 day, 100% coverage if 60 days was in between her stays. She thought she was only at the [facility] for 20 days. Phone call made to [facility] to inquire how long she was previously there, etc. I spoke with the RN supervisor, Danitza, who stated they \"would love to have her back\". I will fax admission paperwork to them to [phone] per Danitza's request. Weekday admission staff john be able to see how many days she has used, if any, of her Medicare days as they may have reset by now.     Conversation about transportation with the pt. She states she took a stretcher the last time she went to the [facility], but also states the pt's friend, Katherine, can help if needed. I explained to her that insurance does not always cover a stretcher, and again she asked me to call her sister Sahil.    I left a VM for Sahil asking for her to page me.     Assessment:  SW will continue to assist with DC planning as needed.    Plan:    Anticipated Disposition:  Facility:  [facility]    Barriers to d/c plan:  Medical readiness    Follow Up:  SW will continue to assist as " needed.    QUYNH Weathers  [facility] Weekend   4A, 4C, 4E, 5A and 5B; pager [phone]  6A, 6B, 6C and 6D; pager [phone]  7A, 7B, 7C, 7D and 5C; pager [phone]

## 2018-07-28 NOTE — PROGRESS NOTES
REGIONAL ANESTHESIA PAIN SERVICE CONTINUOUS NERVE INFUSION NOTE  Elizabeth Taylor is a 78 year old female POD #2 s/p ARTHROPLASTY HIP  ANESTHESIA OUT OF OR and placement of right fascia iliaca catheter for pain control.    SUBJECTIVE:  Interval History: Overnight events: No acute events.  Patient reports good pain control with nerve block continuous infusion and current analgesics (see below).  Denies weakness, paresthesias, circumoral numbness, metallic taste or tinnitus. Patient ambulating with assistance.  Currently denies nausea or vomiting; tolerating a regular diet.       Clinically Aligned Pain Assessment (CAPA):   Comfort (How is your pain?): Comfortably manageable  Change in Pain (Since your last medication/intervention?): Getting better  Pain Control (How are your pain treatments working?):  Fully effective pain control  Functioning (Are you able to do activities to get better?) : Can do everything I need to do  Sleep (Does your pain management allow you to sleep or rest?): Normal sleep     Numerical Rating Scale (NRS):  1/10 at rest and 2/10 with activity      Antithrombotic/Thrombolytic Therapy ordered:  Lovenox 30 mg subcutaneous q24hr    Medications related to Pain Management (Future)    Start     Dose/Rate Route Frequency Ordered Stop    07/27/18 0930  diclofenac (FLECTOR) 1.3 % Patch 1 patch      1 patch Transdermal 2 TIMES DAILY 07/27/18 0912      07/27/18 0915  diclofenac (FLECTOR) Patch in Place       Transdermal EVERY 8 HOURS 07/27/18 0912      07/26/18 2159  lidocaine 1 % 1 mL      1 mL Other EVERY 1 HOUR PRN 07/26/18 2159      07/26/18 2159  lidocaine (LMX4) cream       Topical EVERY 1 HOUR PRN 07/26/18 2159      07/26/18 1015  ROPivacaine 0.2% (NAROPIN) 750 mL in ON-Q C-Bloc select flow ([id number] holds 600-750 mL) single cath disposable pump      8 mL/hr  8 mL/hr  Irrigation Elastomeric Pump 07/26/18 1008      07/26/18 0000  acetaminophen (TYLENOL) tablet 1,000 mg      1,000 mg Oral EVERY 8  HOURS 07/25/18 2309 07/25/18 2309  methocarbamol (ROBAXIN) tablet 500 mg      500 mg Oral 2 TIMES DAILY PRN 07/25/18 2309 07/25/18 2309  oxyCODONE IR (ROXICODONE) half-tab 2.5-5 mg      2.5-5 mg Oral EVERY 3 HOURS PRN 07/25/18 2309 07/25/18 2309  polyethylene glycol (MIRALAX/GLYCOLAX) Packet 17 g      17 g Oral DAILY PRN 07/25/18 2309 07/25/18 2309  HYDROmorphone (DILAUDID) injection 0.2 mg      0.2 mg Intravenous EVERY 2 HOURS PRN 07/25/18 2309 07/25/18 2309  bisacodyl (DULCOLAX) Suppository 10 mg      10 mg Rectal DAILY PRN 07/25/18 2309             OBJECTIVE:  Lab results  Recent Labs   Lab Test  07/28/18   0716   WBC  8.5   RBC  3.86   HGB  10.8*   HCT  34.3*   MCV  89   MCH  28.0   MCHC  31.5   RDW  14.3   PLT  283       Lab Results   Component Value Date    INR 1.05 07/25/2018    INR 1.13 04/04/2018    INR 1.18 04/03/2018         Vitals:    Temp:  [97.6  F (36.4  C)-99.1  F (37.3  C)] 97.6  F (36.4  C)  Heart Rate:  [89-98] 89  Resp:  [16-18] 16  BP: (104-130)/(52-56) 130/52  SpO2:  [93 %-96 %] 94 %    Exam:   GEN: alert and no distress  Strength BLE 5/5  and overall symmetric, extremities normal- no gross deformities noted  SKIN: right fascia iliaca catheter site with dressing c/d/i, no tenderness, erythema, heme, edema      ASSESSMENT/PLAN:    Patient is receiving adequate analgesia with current multimodal therapy including  fascia iliaca catheter infusion ropivacaine 0.2% total infusion 8mL/hour. Good Motor function  and adequate sensory block,  meeting activity goals.  No evidence of adverse side effects related to local anesthetic.     - continue current ropivacaine 0.2% total infusion rate 8 mL/hour  - Please contact RAPS service a day prior to plan discharge date so infusion can be stopped and pain control assessed without block in place  - antithrombotic/thrombolytic therapy  ordered. Plan: Please contact RAPS (#[phone]) prior to any medication changes  - will continue to follow  and adjust as needed    - discussed plan with attending anesthesiologist    Doron Guevara, DO  Regional Anesthesia Pain Service  7/28/2018 1:55 PM    RAPS Contact Info (24 hour job code pager is the last 4 digits) For in-house use only:   Mat[employer] Money phone: [place] [phone], [place] [phone], Peds [phone], then enter call-back number.    Text: Use [employer] on the Intranet <Paging/Directory> tab and enter Jobcode ID.   If no call back at any time, contact the hospital  and ask for RAPS attending or backup

## 2018-07-28 NOTE — PLAN OF CARE
Problem: Patient Care Overview  Goal: Plan of Care/Patient Progress Review  OT:  Paged ortho resident; per ortho no fracture of R ankle; ok to continue WBAT.  Patient concerned and wanting this clarified.

## 2018-07-28 NOTE — PLAN OF CARE
"Problem: Patient Care Overview  Goal: Plan of Care/Patient Progress Review  Outcome: No Change  /65 (BP Location: Right arm)  Pulse 78  Temp 95.9  F (35.5  C) (Axillary)  Resp 16  Ht 1.676 m (5' 6\")  Wt 51.9 kg (114 lb 8 oz)  SpO2 97%  BMI 18.48 kg/m2   Afebrile, pain is controlled with 5 mg of oxy, CMS is intact, hip protector applied, pillow utilized, neuro is intact, forgetful, oriented x4, OT and PT worked with pt, tolerating diet, incontinent with urine, small loose stool, baseline n/t, cont with POC      "

## 2018-07-28 NOTE — PLAN OF CARE
Problem: Patient Care Overview  Goal: Plan of Care/Patient Progress Review  OT7B/Edema: CX: Pt reports her LEs are back to normal size. Pt's LEs appear to have chronic edema changes with skin color and texture. Minimal non pitting edema noted in feet, ankles and LEs. Pt declined need for any compression as acute swelling has subsided. Will complete edema orders as pt was able to teach back non compressive techniques for edema mgmt with Ind.

## 2018-07-28 NOTE — PROGRESS NOTES
" 07/28/18 0938   Quick Adds   Type of Visit Initial Occupational Therapy Evaluation   Living Environment   Lives With sibling(s)   Living Arrangements house   Number of Stairs to Enter Home 4   Number of Stairs Within Home 4   Living Environment Comment Per PT, patient had been staying with sister following a TCU stay, patient had stayed at home for 2 weeks following fx.   Self-Care   Usual Activity Tolerance moderate   Current Activity Tolerance fair   Equipment Currently Used at Home walker, rolling   Activity/Exercise/Self-Care Comment Patient had recently undergone TCU stay for chemo/radiation endurance and rib fractures, had DC'd to sister's house with independence with basic ADLs and ambulation with walker.  Patient felt \"lightning sharp\" pain in R  hip and fell at this point.  Prior to TCU stays/chemo (patient has difficulty with timeline) was independent with ADLs/mobility without AE.   Functional Level Prior   Ambulation 0-->independent   Transferring 0-->independent   Toileting 0-->independent   Bathing 0-->independent   Dressing 0-->independent   Eating 0-->independent   Communication 0-->understands/communicates without difficulty   Swallowing 0-->swallows foods/liquids without difficulty   Fall history within last six months yes   Prior Functional Level Comment Patient had recently undergone TCU stay for chemo/radiation endurance and rib fractures, had DC'd to sister's house with independence with basic ADLs and ambulation with walker.  Patient felt \"lightning sharp\" pain in R  hip and fell at this point.  Prior to TCU stays/chemo (patient has difficulty with timeline) was independent with ADLs/mobility without AE.   General Information   Onset of Illness/Injury or Date of Surgery - Date 07/26/18   Referring Physician Dr. Phan   Additional Occupational Profile Info/Pertinent History of Current Problem Elizabeth Taylor is a 78 year old female with right femoral neck fracture now s/p right hip " hemiarthroplasty on 7/26 ; hx also includes Stage III anal cancer, R side rib fx   Precautions/Limitations right hip precautions   Weight-Bearing Status - RLE weight-bearing as tolerated   Cognitive Status Examination   Level of Consciousness alert   Able to Follow Commands WNL/WFL   Cognitive Comment No cognitive concerns   Sensory Examination   Sensory Comments No N/T noted   Pain Assessment   Patient Currently in Pain Yes, see Vital Sign flowsheet   Mobility   Bed Mobility Bed mobility skill: Sit to supine;Bed mobility skill: Supine to sit   Bed Mobility Skill: Sit to Supine   Level of [place]: Sit/Supine maximum assist (25% patients effort)   Physical Assist/Nonphysical Assist: Sit/Supine 2 persons   Bed Mobility Skill: Supine to Sit   Level of [place]: Supine/Sit maximum assist (25% patients effort)   Physical Assist/Nonphysical Assist: Supine/Sit 2 persons   Transfer Skill: Bed to Chair/Chair to Bed   Level of [place]: Bed to Chair minimum assist (75% patients effort)   Physical Assist/Nonphysical Assist: Bed to Chair 2 persons   Weight-Bearing Restrictions weight-bearing as tolerated   Assistive Device - Transfer Skill Bed to Chair Chair to Bed Rehab Eval standard walker   Transfer Skill: Sit to Stand   Level of Hughes: Sit/Stand minimum assist (75% patients effort)   Physical Assist/Nonphysical Assist: Sit/Stand 2 persons   Transfer Skill: Sit to Stand weight-bearing as tolerated   Assistive Device for Transfer: Sit/Stand standard walker   Transfer Skill: Toilet Transfer   Level of [place]: Toilet unable to perform   Bathing   Level of Hughes maximum assist (25% patients effort)   Upper Body Dressing   Level of [place]: Dress Upper Body other (see comments)  (not assessed)   Lower Body Dressing   Level of [place]: Dress Lower Body maximum assist (25% patients effort)   Toileting   Level of [place]: Toilet dependent (less than 25% patients effort)   Grooming  "  Level of [place]: Grooming stand-by assist   Eating/Self Feeding   Level of [place]: Eating independent   Activities of Daily Living Analysis   Impairments Contributing to Impaired Activities of Daily Living fear and anxiety;pain;post surgical precautions;ROM decreased;strength decreased   General Therapy Interventions   Planned Therapy Interventions ADL retraining;strengthening   Clinical Impression   Criteria for Skilled Therapeutic Interventions Met yes, treatment indicated   OT Diagnosis impaired self-cares/mobility   Influenced by the following impairments pain; decreased ROM   Assessment of Occupational Performance 1-3 Performance Deficits   Identified Performance Deficits dressing, bathing, toileting   Clinical Decision Making (Complexity) Low complexity   Therapy Frequency other (see comments)  (6x/week)   Predicted Duration of Therapy Intervention (days/wks) 4-5 days   Anticipated Discharge Disposition Transitional Care Facility   Risks and Benefits of Treatment have been explained. Yes   Patient, Family & other staff in agreement with plan of care Yes   [facility] TM \"6 Clicks\"   2016, Trustees of [facility], under license to [employer].  All rights reserved.   6 Clicks Short Forms Daily Activity Inpatient Short Form   [facility]  \"6 Clicks\" Daily Activity Inpatient Short Form   1. Putting on and taking off regular lower body clothing? 2 - A Lot   2. Bathing (including washing, rinsing, drying)? 2 - A Lot   3. Toileting, which includes using toilet, bedpan or urinal? 1 - Total   4. Putting on and taking off regular upper body clothing? 3 - A Little   5. Taking care of personal grooming such as brushing teeth? 3 - A Little   6. Eating meals? 4 - None   Daily Activity Raw Score (Score out of 24.Lower scores equate to lower levels of function) 15   Total Evaluation Time   Total Evaluation Time (Minutes) 8     "

## 2018-07-28 NOTE — PLAN OF CARE
Problem: Patient Care Overview  Goal: Plan of Care/Patient Progress Review  Discharge Planner OT   Patient plan for discharge: TCU, hopes to return to same TCU was at prior  Current status: Patient alert and oriented, difficult to stay on topic (attention vs. Anxiety), needs extra time and calming techniques.  Patient supine to sit with Max A x 2, Min A x 2 to stand with walker and transfer to chair with Min A x 2 but needs extra time and step by step directions.  Patient very sensitive to touch on R LE.  Barriers to return to prior living situation: Pain, fatigue, weakness, hip precautions  Recommendations for discharge: TCU  Rationale for recommendations: Continued OT for maximum independence in ADLs/mobility       Entered by: Heather Otto 07/28/2018 11:50 AM

## 2018-07-28 NOTE — PLAN OF CARE
Problem: Patient Care Overview  Goal: Plan of Care/Patient Progress Review  Outcome: No Change  Vital signs:  Temp: 97.9  F (36.6  C) Temp src: Oral BP: 104/55   Heart Rate: 91 Resp: 16 SpO2: 96 % O2 Device: None (Room air)     VSS on RA. A&O x4, forgetful at times, however, calls appropriately. Left PIV infusing LR @50mL/hr. Right hip dressing c/d/i. +1 pulses to BLE. Abductor pillow in use. ON-Q pump @ 8mL/hr. Pt c/o pain, oxy given x2 with some relief. Denies nausea. Voiding adequate amounts, 1 episode urine incontinence, however, able to call for bedpan most of the time. +BS, passing flatus, no BM overnight. Sleeping between cares.

## 2018-07-29 ENCOUNTER — APPOINTMENT (OUTPATIENT)
Dept: OCCUPATIONAL THERAPY | Facility: CLINIC | Age: 78
DRG: 470 | End: 2018-07-29
Payer: MEDICARE

## 2018-07-29 ENCOUNTER — APPOINTMENT (OUTPATIENT)
Dept: GENERAL RADIOLOGY | Facility: CLINIC | Age: 78
DRG: 470 | End: 2018-07-29
Payer: MEDICARE

## 2018-07-29 LAB
HGB BLD-MCNC: 9.8 G/DL (ref 11.7–15.7)
PLATELET # BLD AUTO: 257 10E9/L (ref 150–450)
POTASSIUM SERPL-SCNC: 4.4 MMOL/L (ref 3.4–5.3)

## 2018-07-29 PROCEDURE — 85018 HEMOGLOBIN: CPT | Performed by: STUDENT IN AN ORGANIZED HEALTH CARE EDUCATION/TRAINING PROGRAM

## 2018-07-29 PROCEDURE — 25000125 ZZHC RX 250: Performed by: STUDENT IN AN ORGANIZED HEALTH CARE EDUCATION/TRAINING PROGRAM

## 2018-07-29 PROCEDURE — 25000132 ZZH RX MED GY IP 250 OP 250 PS 637: Mod: GY | Performed by: PHYSICIAN ASSISTANT

## 2018-07-29 PROCEDURE — 40000133 ZZH STATISTIC OT WARD VISIT: Performed by: OCCUPATIONAL THERAPIST

## 2018-07-29 PROCEDURE — A9270 NON-COVERED ITEM OR SERVICE: HCPCS | Mod: GY | Performed by: SURGERY

## 2018-07-29 PROCEDURE — A9270 NON-COVERED ITEM OR SERVICE: HCPCS | Mod: GY | Performed by: PHYSICIAN ASSISTANT

## 2018-07-29 PROCEDURE — 85049 AUTOMATED PLATELET COUNT: CPT | Performed by: STUDENT IN AN ORGANIZED HEALTH CARE EDUCATION/TRAINING PROGRAM

## 2018-07-29 PROCEDURE — 73630 X-RAY EXAM OF FOOT: CPT | Mod: RT

## 2018-07-29 PROCEDURE — 84132 ASSAY OF SERUM POTASSIUM: CPT | Performed by: STUDENT IN AN ORGANIZED HEALTH CARE EDUCATION/TRAINING PROGRAM

## 2018-07-29 PROCEDURE — 97535 SELF CARE MNGMENT TRAINING: CPT | Mod: GO | Performed by: OCCUPATIONAL THERAPIST

## 2018-07-29 PROCEDURE — 25000128 H RX IP 250 OP 636: Performed by: SURGERY

## 2018-07-29 PROCEDURE — 12000008 ZZH R&B INTERMEDIATE UMMC

## 2018-07-29 PROCEDURE — 25000132 ZZH RX MED GY IP 250 OP 250 PS 637: Mod: GY | Performed by: SURGERY

## 2018-07-29 PROCEDURE — 27110038 ZZH RX 271: Performed by: STUDENT IN AN ORGANIZED HEALTH CARE EDUCATION/TRAINING PROGRAM

## 2018-07-29 PROCEDURE — 36415 COLL VENOUS BLD VENIPUNCTURE: CPT | Performed by: STUDENT IN AN ORGANIZED HEALTH CARE EDUCATION/TRAINING PROGRAM

## 2018-07-29 PROCEDURE — 99233 SBSQ HOSP IP/OBS HIGH 50: CPT | Performed by: PHYSICIAN ASSISTANT

## 2018-07-29 RX ORDER — ZINC OXIDE
OINTMENT (GRAM) TOPICAL 2 TIMES DAILY
DISCHARGE
Start: 2018-07-29 | End: 2018-09-21

## 2018-07-29 RX ORDER — MULTIVITAMIN,THERAPEUTIC
1 TABLET ORAL DAILY
Refills: 0 | DISCHARGE
Start: 2018-07-29 | End: 2018-09-21

## 2018-07-29 RX ORDER — VANCOMYCIN HYDROCHLORIDE 50 MG/ML
125 KIT ORAL 4 TIMES DAILY
Qty: 105 ML | DISCHARGE
Start: 2018-07-27 | End: 2019-02-07

## 2018-07-29 RX ORDER — ACETAMINOPHEN 500 MG
1000 TABLET ORAL EVERY 8 HOURS
DISCHARGE
Start: 2018-07-29 | End: 2019-02-07

## 2018-07-29 RX ADMIN — ENOXAPARIN SODIUM 30 MG: 30 INJECTION SUBCUTANEOUS at 10:06

## 2018-07-29 RX ADMIN — Medication: at 21:01

## 2018-07-29 RX ADMIN — Medication: at 12:22

## 2018-07-29 RX ADMIN — THERA TABS 1 TABLET: TAB at 07:54

## 2018-07-29 RX ADMIN — Medication 5 MG: at 08:03

## 2018-07-29 RX ADMIN — Medication: at 07:57

## 2018-07-29 RX ADMIN — DICLOFENAC EPOLAMINE 1 PATCH: 0.01 PATCH TOPICAL at 21:01

## 2018-07-29 RX ADMIN — Medication 1 TABLET: at 07:54

## 2018-07-29 RX ADMIN — Medication 5 MG: at 20:58

## 2018-07-29 RX ADMIN — ACETAMINOPHEN 1000 MG: 500 TABLET, FILM COATED ORAL at 16:52

## 2018-07-29 RX ADMIN — Medication 5 MG: at 00:18

## 2018-07-29 RX ADMIN — VANCOMYCIN HYDROCHLORIDE 125 MG: KIT at 20:58

## 2018-07-29 RX ADMIN — B-COMPLEX W/ C & FOLIC ACID TAB 1 MG 1 TABLET: 1 TAB at 07:55

## 2018-07-29 RX ADMIN — Medication 5 MG: at 16:51

## 2018-07-29 RX ADMIN — VANCOMYCIN HYDROCHLORIDE 125 MG: KIT at 12:22

## 2018-07-29 RX ADMIN — Medication 8 ML/HR: at 08:05

## 2018-07-29 RX ADMIN — Medication: at 21:00

## 2018-07-29 RX ADMIN — Medication 5 MG: at 10:53

## 2018-07-29 RX ADMIN — ACETAMINOPHEN 1000 MG: 500 TABLET, FILM COATED ORAL at 07:55

## 2018-07-29 RX ADMIN — METHOCARBAMOL 500 MG: 500 TABLET ORAL at 05:05

## 2018-07-29 RX ADMIN — DICLOFENAC EPOLAMINE 1 PATCH: 0.01 PATCH TOPICAL at 08:03

## 2018-07-29 RX ADMIN — VANCOMYCIN HYDROCHLORIDE 125 MG: KIT at 08:03

## 2018-07-29 RX ADMIN — VANCOMYCIN HYDROCHLORIDE 125 MG: KIT at 16:49

## 2018-07-29 RX ADMIN — Medication: at 14:00

## 2018-07-29 RX ADMIN — Medication 5 MG: at 05:04

## 2018-07-29 ASSESSMENT — VISUAL ACUITY
OU: GLASSES
OU: GLASSES

## 2018-07-29 ASSESSMENT — ACTIVITIES OF DAILY LIVING (ADL)
ADLS_ACUITY_SCORE: 10

## 2018-07-29 ASSESSMENT — PAIN DESCRIPTION - DESCRIPTORS
DESCRIPTORS: ACHING;DULL

## 2018-07-29 NOTE — PLAN OF CARE
Problem: Patient Care Overview  Goal: Plan of Care/Patient Progress Review  Outcome: No Change  Vital signs:  Temp: 97.4  F (36.3  C) Temp src: Oral BP: 126/63 Pulse: 88 Heart Rate: 97 Resp: 16 SpO2: 99 % O2 Device: None (Room air)     VSS on RA. A&O x4, forgetful at times, however, calls appropriately. Left PIV SL. Right hip dressing c/d/i. +1 pulses to BLE. Abductor pillow in use. ON-Q pump @ 8mL/hr. Pt c/o pain, oxy given x2, robaxin given x1 with some relief. Denies nausea. Voiding via bedpan overnight, incontinent of urine at times. +BS, passing flatus, no BM overnight. Sleeping between cares. Plan for discharge to TCU, possibly on Monday.

## 2018-07-29 NOTE — PROGRESS NOTES
[facility], [place]  Trauma Service Progress Note    Date of Service (when I saw the patient): 07/29/2018     Assessment & Plan   Trauma Mechanism:Ground level fall 2 weeks prior to presentation   Known Injuries:  1. Traumatic right femoral neck fracture   2. Acute traumatic pain       Other diagnoses:  1. Stage III anal cancer s/p Chemo and radiation   2. Limited mobility 2/2 # 1   3. Recent catheter related wound due to perianal radiation   4. Hx of subacute right sided rib fractures 8-10 (dx 3/18)  5. Peripheral edema   6. C. Diff diarrhea       Procedure(s):  1. RIGHT Fascia Iliaca Catheter--Regional anesthesia pain 7/26/18  2. RIGHT hemiarthroplasty--Dr Phan 7/26/18      Plan:  1. Trauma   1. TRN to do fall teaching and trauma education.   2. UA/UC negative.   3. Tert examination negative for additional injuries.   2. Pain control: oral/IV medications, scheduled tylenol, PRN Low dose opioids, cautious uses of pain medications given hx of AMS with narcotics per last admission (pt received concurrent tincture of opium and oral narcotics pain medications). flector patch.  1. Pain appears controlled with this regimen.   3. Appreciate regional pain team following. Fascia block management deferred to anesthesia. Plan to keep for now and remove prior to discharge   4. Appreciate orthopedics following.   1. Wound cares per orthopedics. Ice to right hip   2. Posterior hip precautions x3 mos. Abductor pillow x6 weeks per orthopedics   3. WBAT to RLE  4. F/U clinic in 2 weeks for wound check with Dr Phan or team.   5. Diet as she tolerates. Encourage oral intake.   6. Palliative care consulted. Appreciate thoughts and cares   7. Physical and occupational therapy consults. OOB. Mobilize as able   8. C. Diff:  oral Vancomycin 125 mg QID per IDSA guidelines.  Plan for 10 day course   9. Local cares to periarea to prevent skin injury, home regimen ordered. Order for Proshield plus  placed  10. Home medications reviewed and appropriate medications resumed  11. Social work consult for placement and discharge planning. Needs TCU-- Plan to return to [facility] upon discharge. SW/CC aware.       General Cares:   PPI/H2 blocker:  None indicated   DVT prophylaxis: Lovenox 30 mg subcutaneous   Bowel Regimen/Date of last stool: 7/28/18  Pulmonary toilet:  IS and acapella   Lines / drains: no farias per pt request.     Interval History  Course reviewed. No acute events overnight. Reports pain controlled with current regimen. Very happy to have worked with therapies yesterday. Patient denies fever, chills or sweats.     ROS x 8 negative with exception of those things listed in interval hx    Physical Exam   Temp: 97.7  F (36.5  C) Temp src: Axillary BP: 125/59 Pulse: 85 Heart Rate: 97 Resp: 16 SpO2: 98 % O2 Device: None (Room air)    Vitals:    07/25/18 2310 07/28/18 1146   Weight: 47.9 kg (105 lb 9.6 oz) 51.9 kg (114 lb 8 oz)     Vital Signs with Ranges  Temp:  [95.9  F (35.5  C)-97.7  F (36.5  C)] 97.7  F (36.5  C)  Pulse:  [85-88] 85  Heart Rate:  [97] 97  Resp:  [16] 16  BP: (102-126)/(48-65) 125/59  SpO2:  [97 %-99 %] 98 %  I/O last 3 completed shifts:  In: 330 [P.O.:330]  Out: 250 [Urine:250]  # Pain Assessment:  Current Pain Score 7/29/2018   Patient currently in pain? yes   Pain score (0-10) -   Pain location Hip   Pain descriptors Aching;Dull   - Elizabeth is experiencing pain due to  Right femur fracture. Pain management was discussed and the plan was created in a collaborative fashion.  Elizabeth's response to the current recommendations: engaged  - Please see the plan for pain management as documented above    [place] Coma Scale - Total 15/15  Constitutional: Awake, alert, cooperative, no apparent distress, laying in bed.  HEENT: pupils equal. EOMI intact. OP clear. Tongue midline.   Respiratory: BBS, breathing unlabored, no increased work of breathing, good air exchange, clear to  auscultation bilaterally, no crackles or wheezing.  Cardiovascular:  regular rate and rhythm, normal S1 and S2, no S3 or S4, and no murmur noted.  GI: Normal bowel sounds, soft, non-distended, non-tender, no guarding  Genitourinary: voiding per briefs.    Musculoskeletal: Mild right swelling, Dressing present and intact. Block catheter in place. MILLS. Calves soft and non-tender, Distal pulses 2+   Neurologic: Awake, alert, oriented x4 but forgetful at times.  Cranial nerves II-XII are grossly intact.  Strength and sensory is intact.  No focal deficits  Neuropsychiatric: Calm, normal eye contact, alert, affect appropriate to situation.        Guzman Uriostegui PA-C  To contact the trauma service use job code pager [phone],   Numeric texts or alpha text through [facility]

## 2018-07-29 NOTE — PLAN OF CARE
Problem: Patient Care Overview  Goal: Plan of Care/Patient Progress Review  VSS. CMS intact. Abductor pillow in place. Dressing CDI. Tried ice on hip X 1, but did not feel like it helped. Took Tylenol and Oxycodone with some relief. Patient changed muliple times in bed alone with using the bed pan. Patient wondered about using Proshield skin protectant for elfego area. She mentioned something about a Dr. López and has information in her room about it. Has OnQ pump per order. Continue POC.

## 2018-07-29 NOTE — PLAN OF CARE
Problem: Patient Care Overview  Goal: Plan of Care/Patient Progress Review  Patient is alert and oriented, pain med given for right hip pain, dressing intact, abductor pillow present, platelet 257. ON-Q pump running too, right foot discomfort, notified MD on this, will continue to monitor.

## 2018-07-29 NOTE — PROGRESS NOTES
Social Work Services Progress Note    Hospital Day: 5  Date of Initial Social Work Evaluation:  Not completed  Collaborated with:  Pt's sister Sahil- per pt request    Data:  Chart review indicates, Elizabeth Taylor is a 78 year old female with right femoral neck fracture now s/p right hip hemiarthroplasty on 7/26 with Dr. Phan    Intervention:  Received a return call from pt's sister Sahil. She states the pt was admitted to TCU on 4/3 and was discharged 6/19. We discussed the Medicare coverage in detail for STR. I explained that we will contact the [facility], business office, to determine exactly how many days are left in her 100 day period as it is most likely almost gone.     Discussion about whether or not the pt would qualify for MA. Sahil explained the pt receives approx 900.00 monthly but has a significant amount of money in her savings, etc. We discussed the elderly waiver guidelines and explained that as her savings decrease she would most likely be eligible for the EW. STR SW can offer more assistance on this topic as well.     Discussion about stretcher transport. Sahil is aware this is an out of pocket expense and is agreeable to the pt using this service if she needs it. She explained that the pt lives with her and her . They will be out of town until 8/11 and that she needs her to be at the TCU until than at a minimum as they won't be home to care for her.    Assessment:  SW to assist with TCU placement.    Plan:    Anticipated Disposition:  Facility:  [facility] pending bed availabilty    Barriers to d/c plan:  Medical readiness and TCU acceptance.    Follow Up:  SW to continue monitoring and assisting with TCU placement.    QUYNH Weathers  [facility] Weekend   4A, 4C, 4E, 5A and 5B; pager [phone]  6A, 6B, 6C and 6D; pager [phone]  7A, 7B, 7C, 7D and 5C; pager [phone]

## 2018-07-29 NOTE — PLAN OF CARE
Problem: Patient Care Overview  Goal: Plan of Care/Patient Progress Review  Discharge Planner OT   Patient plan for discharge: not stated  Current status: Pt continues to have high anxiety related to pain/activity and benefits from slow pace and reassurance. Pt able to transfer to EOB with mod A of 1-2; min A of 1 to stand and transfer to chair using FWW. Pt able to complete seated ADLs with set-up and SBA.  Barriers to return to prior living situation: weakness, pain, surgical precautions, impaired balance, anxiety  Recommendations for discharge: TCU  Rationale for recommendations: to increase pt's (I) with ADL/IADLs       Entered by: Kandy Yanez 07/29/2018 3:22 PM

## 2018-07-29 NOTE — PLAN OF CARE
Problem: Patient Care Overview  Goal: Plan of Care/Patient Progress Review  Discharge Planner PT   Patient plan for discharge: TCU  Current status: PT: Pt seated in chair finishing breakfast after OT session, agreeale to work with PT to return to bed. Pt incont of BM, NST present to A with hygeine. Pt educated on plan, technique for standing, progression to step to turn to bed after, demo provided and extra time to answer detailed questions about positioning etc. Pt transfers sit>stand min A x2 with light mod A when transition hands to walker. Pt able to stand mod A x5 minutes for total A hygeine, PT did need to block L knee toward end due to fatigue. Pt wanted to turn to bed, heavy mod A x2 with pt able to take a few steps but then pt unable to follow different attempts at directing pt to bend at hips to sit, pt very stiff in standing and mod to max A x2 to sit pt on bed, anxiety is a significant factor. Once seated pt needed light min to mod A to scoot to position and light mod A sit>supine into hooklying position with minimal pain reported during transfer.   Barriers to return to prior living situation: medical and functional status  Recommendations for discharge: TCU  Rationale for recommendations: based on current functional status       Entered by: Stacey Alexis 07/29/2018 8:31 AM

## 2018-07-29 NOTE — PLAN OF CARE
Problem: Patient Care Overview  Goal: Plan of Care/Patient Progress Review  Patient likes popsicle, had watery stool, incontinent, appetite fair, will be having xray of the leg

## 2018-07-29 NOTE — PROGRESS NOTES
Orthopaedic Surgery Progress Note:       Subjective:   NAEO. Pain is controlled on oral meds. Tolerating oral diet. Voiding+/bm+. Denies fever/chills/SOB/chest pain/nause/vomiting/new numbness/tinging.    Has not ambulated, planning to ambulate today.   Reports a R proximal midfoot pain.    Objective:   Temp:  [95.9  F (35.5  C)-97.7  F (36.5  C)] 97.7  F (36.5  C)  Pulse:  [85-88] 85  Heart Rate:  [97] 97  Resp:  [16] 16  BP: (102-126)/(48-65) 125/59  SpO2:  [97 %-99 %] 98 %    Intake/Output Summary (Last 24 hours) at 07/27/18 0753  Last data filed at 07/27/18 0720   Gross per 24 hour   Intake             1684 ml   Output              780 ml   Net              904 ml       Gen: NAD. Resting comfortably in bed  Resp: Breathing comfortably on room air  RLE:  aquacel is c/d/i, 1 minimal scant drainage (2-4mm). Abduction pillow in place.  SILT in femoral, saphenous, sural, deep peroneal, superficial peroneal, and tibial n dist.   Fires EHL/FHL/TA/Gastroc with 4/5 strength      Foot skin intact, not swollen, nonerythematous, nontender to palpation at posterior/lateral/medial malleolus, weakly able to dorsiflex/plantar flexion intact tender to palpation proximal anterior midfoot, is wwp    Labs:    Recent Labs  Lab 07/29/18  0650 07/28/18  0716 07/27/18  0759 07/26/18  0652   WBC  --  8.5 7.4 5.7   HGB 9.8* 10.8* 11.3* 11.9    [phone]     7/26 OR cultures - NGTD     Assessment & Plan:   Post-Op Plan:  Assessment/Plan: lEizabeth Taylor is a 78 year old female with right femoral neck fracture now s/p right hip hemiarthroplasty on 7/26 with Dr. Phan.    7/27 - C. Diff positive.  7/29 - tender to palpation at R anterior, proximal midfoot. foot x-rays ordered     Trauma Primary  Activity: Up with assist and assistive devices as needed until independent. Posterior hip precautions x 3 months.   Weight bearing status: WBAT  ABD Pillow at all times when at rest and in bed for 6 weeks  Antibiotics: Clinda x 24  hours. -- C diff positive; abx per primary team  Diet: adat. Bowel regimen. Anti-emetics PRN.   DVT prophylaxis: Lovenox 30mg Daily x 4 weeks  Wound Care: Aquacel x 7 days.    Pain management: Orals as needed. IV for breakthrough pain only.   X-rays: AP Pelvis and Lateral R hip in PACU. Completed. R foot x-rays pending.  Physical Therapy: Mobilization, ROM, ADL's, Posterior hip precautions.    Occupational Therapy: ADL's  Labs: Trend Hgb on POD #1, 2.   Consults: PT, OT. Medicine, SW/CC  Follow-up: Clinic in 2 weeks for wound check     Disposition: pending R foot x-rays, if no pain will sign off, if medically stable anticipate discharge to TCU/ARU in 1-2 days.        Assessment and plan discussed with Dr. Phan.    Boris Cheng MD  Orthopaedic Surgery Resident, PGY1   Pager: [phone]

## 2018-07-29 NOTE — PLAN OF CARE
Problem: Patient Care Overview  Goal: Plan of Care/Patient Progress Review  Pain Clinic team came and clamped the On-Q pump, at 1.10 pm, since they felt that patient will be discharging tomorrow.

## 2018-07-30 ENCOUNTER — APPOINTMENT (OUTPATIENT)
Dept: PHYSICAL THERAPY | Facility: CLINIC | Age: 78
DRG: 470 | End: 2018-07-30
Payer: MEDICARE

## 2018-07-30 ENCOUNTER — TELEPHONE (OUTPATIENT)
Dept: ORTHOPEDICS | Facility: CLINIC | Age: 78
End: 2018-07-30

## 2018-07-30 ENCOUNTER — APPOINTMENT (OUTPATIENT)
Dept: OCCUPATIONAL THERAPY | Facility: CLINIC | Age: 78
DRG: 470 | End: 2018-07-30
Payer: MEDICARE

## 2018-07-30 VITALS
BODY MASS INDEX: 18.4 KG/M2 | OXYGEN SATURATION: 97 % | TEMPERATURE: 96.9 F | SYSTOLIC BLOOD PRESSURE: 121 MMHG | WEIGHT: 114.5 LBS | HEART RATE: 90 BPM | HEIGHT: 66 IN | RESPIRATION RATE: 16 BRPM | DIASTOLIC BLOOD PRESSURE: 67 MMHG

## 2018-07-30 PROCEDURE — 99239 HOSP IP/OBS DSCHRG MGMT >30: CPT | Performed by: NURSE PRACTITIONER

## 2018-07-30 PROCEDURE — 25000132 ZZH RX MED GY IP 250 OP 250 PS 637: Mod: GY | Performed by: SURGERY

## 2018-07-30 PROCEDURE — A9270 NON-COVERED ITEM OR SERVICE: HCPCS | Mod: GY | Performed by: SURGERY

## 2018-07-30 PROCEDURE — 40000193 ZZH STATISTIC PT WARD VISIT

## 2018-07-30 PROCEDURE — 97535 SELF CARE MNGMENT TRAINING: CPT | Mod: GO | Performed by: OCCUPATIONAL THERAPIST

## 2018-07-30 PROCEDURE — A9270 NON-COVERED ITEM OR SERVICE: HCPCS | Mod: GY | Performed by: PHYSICIAN ASSISTANT

## 2018-07-30 PROCEDURE — 25000128 H RX IP 250 OP 636: Performed by: SURGERY

## 2018-07-30 PROCEDURE — 97530 THERAPEUTIC ACTIVITIES: CPT | Mod: GP

## 2018-07-30 PROCEDURE — 40000133 ZZH STATISTIC OT WARD VISIT: Performed by: OCCUPATIONAL THERAPIST

## 2018-07-30 PROCEDURE — 25000132 ZZH RX MED GY IP 250 OP 250 PS 637: Mod: GY | Performed by: PHYSICIAN ASSISTANT

## 2018-07-30 RX ORDER — OXYCODONE HYDROCHLORIDE 5 MG/1
2.5-5 TABLET ORAL
Qty: 20 TABLET | Refills: 0 | Status: SHIPPED | DISCHARGE
Start: 2018-07-30 | End: 2018-09-21

## 2018-07-30 RX ADMIN — Medication: at 13:50

## 2018-07-30 RX ADMIN — ENOXAPARIN SODIUM 30 MG: 30 INJECTION SUBCUTANEOUS at 10:30

## 2018-07-30 RX ADMIN — METHOCARBAMOL 500 MG: 500 TABLET ORAL at 05:08

## 2018-07-30 RX ADMIN — Medication 5 MG: at 05:08

## 2018-07-30 RX ADMIN — THERA TABS 1 TABLET: TAB at 07:37

## 2018-07-30 RX ADMIN — Medication 5 MG: at 15:37

## 2018-07-30 RX ADMIN — Medication: at 07:38

## 2018-07-30 RX ADMIN — Medication: at 07:40

## 2018-07-30 RX ADMIN — Medication 1 TABLET: at 07:37

## 2018-07-30 RX ADMIN — VANCOMYCIN HYDROCHLORIDE 125 MG: KIT at 07:36

## 2018-07-30 RX ADMIN — ACETAMINOPHEN 1000 MG: 500 TABLET, FILM COATED ORAL at 13:50

## 2018-07-30 RX ADMIN — VANCOMYCIN HYDROCHLORIDE 125 MG: KIT at 13:49

## 2018-07-30 RX ADMIN — Medication 5 MG: at 10:30

## 2018-07-30 RX ADMIN — ACETAMINOPHEN 1000 MG: 500 TABLET, FILM COATED ORAL at 05:14

## 2018-07-30 RX ADMIN — B-COMPLEX W/ C & FOLIC ACID TAB 1 MG 1 TABLET: 1 TAB at 07:36

## 2018-07-30 RX ADMIN — Medication: at 07:37

## 2018-07-30 ASSESSMENT — ACTIVITIES OF DAILY LIVING (ADL)
ADLS_ACUITY_SCORE: 10

## 2018-07-30 ASSESSMENT — VISUAL ACUITY: OU: GLASSES

## 2018-07-30 NOTE — ADDENDUM NOTE
Addendum  created 07/30/18 1038 by Anthony Whatley MD    Anesthesia Event edited, Procedure Event Log accessed, Sign clinical note

## 2018-07-30 NOTE — PROGRESS NOTES
REGIONAL ANESTHESIA PAIN SERVICE FOLLOW UP NOTE  SUBJECTIVE  Interval History: Overnight no acute events.  Patient reports pain improved, adequate relief with current analgesics.  She is very pleased she has been comfortable since nerve block infusion stopped/clamped yesterday afternoon.  Denies weakness, paresthesias, circumoral numbness, metallic taste or tinnitus.  Pt is tolerating diet, voiding, ambulating with assistance, denies nausea.  Patient is currently tolerating  diet     Clinically Aligned Pain Assessment (CAPA):  Comfort (How is your pain?): Comfortably manageable  Change in Pain (Since your last medication/intervention?): Getting better  Pain Control (How are your pain treatments working?):  Fully effective pain control  Functioning (Are you able to do activities to get better?) : Can do most things, but pain gets in the way of some     Antithrombotic/Thrombolytic Therapy:  enoxaparin (LOVENOX) injection 30 mg 30 mg, SC, Q24H Given: 07/29 1006       Medications related to Pain Management (Future)    Start     Dose/Rate Route Frequency Ordered Stop    07/29/18 0000  diclofenac (FLECTOR) 1.3 % Patch      1 patch Transdermal 2 TIMES DAILY 07/29/18 1628      07/29/18 0000  acetaminophen (TYLENOL) 500 MG tablet      1,000 mg Oral EVERY 8 HOURS 07/29/18 1628 08/03/18 2359    07/27/18 0930  diclofenac (FLECTOR) 1.3 % Patch 1 patch      1 patch Transdermal 2 TIMES DAILY 07/27/18 0912      07/27/18 0915  diclofenac (FLECTOR) Patch in Place       Transdermal EVERY 8 HOURS 07/27/18 0912      07/26/18 2159  lidocaine 1 % 1 mL      1 mL Other EVERY 1 HOUR PRN 07/26/18 2159      07/26/18 2159  lidocaine (LMX4) cream       Topical EVERY 1 HOUR PRN 07/26/18 2159      07/26/18 0000  acetaminophen (TYLENOL) tablet 1,000 mg      1,000 mg Oral EVERY 8 HOURS 07/25/18 2309      07/25/18 2309  methocarbamol (ROBAXIN) tablet 500 mg      500 mg Oral 2 TIMES DAILY PRN 07/25/18 2309      07/25/18 2309  oxyCODONE IR (ROXICODONE)  "half-tab 2.5-5 mg      2.5-5 mg Oral EVERY 3 HOURS PRN 07/25/18 2309      07/25/18 2309  polyethylene glycol (MIRALAX/GLYCOLAX) Packet 17 g      17 g Oral DAILY PRN 07/25/18 2309 07/25/18 2309  HYDROmorphone (DILAUDID) injection 0.2 mg      0.2 mg Intravenous EVERY 2 HOURS PRN 07/25/18 2309 07/25/18 2309  bisacodyl (DULCOLAX) Suppository 10 mg      10 mg Rectal DAILY PRN 07/25/18 2309              OBJECTIVE  CBC RESULTS:   Recent Labs   Lab Test  07/29/18   0650  07/28/18   0716   WBC   --   8.5   RBC   --   3.86   HGB  9.8*  10.8*   HCT   --   34.3*   MCV   --   89   MCH   --   28.0   MCHC   --   31.5   RDW   --   14.3   PLT  257  283     Lab Results   Component Value Date    INR 1.05 07/25/2018    INR 1.13 04/04/2018    INR 1.18 04/03/2018    INR 1.11 04/02/2018       Vitals: Blood pressure 114/59, pulse 88, temperature 97.1  F (36.2  C), temperature source Oral, resp. rate 16, height 1.676 m (5' 6\"), weight 51.9 kg (114 lb 8 oz), SpO2 96 %, not currently breastfeeding.    Exam:  Constitutional: healthy, alert and no distress  Neuro/MSK:  Strength BLE 5/5  and overall symmetric  Skin: fascia iliaca site c/d/i, no tenderness, erythema, heme, edema.      ASSESSMENT/PLAN  Elizabeth Taylor is a 78 year old female POD #4 s/p ARTHROPLASTY HIP and placement of right fascia iliaca catheter for analgesia. Patient currently achieving adequate pain control with multimodal analgesia, nerve block stopped since yesterday afternoon.  Is meeting activity goals. No weakness or paresthesias. No evidence of adverse side effects associated with local anesthetic.     right fascia iliaca nerve block catheter removed today July 30, 2018 at 0830 without complication, dark tip intact.  Bandaid applied  - okay to continue enoxaparin Q 24 hrs as ordered - bedside RN aware  - will sign off  - please call RAPS with questions or concerns  - discussed plan with attending anesthesiologist    Kira Yeboah, GRAHAM CNP   Regional " Anesthesia Pain Service  0830    RAPS Contact Info (for in-house use only):  Job code ID: [id number]   [place] 0599  Peds [phone]  [place] phone: dial [phone], enter jobcode ID, then enter call-back number.    Text: Use [employer] on the Intranet <Paging/Directory> tab and enter Jobcode ID.   If no call back at any time, contact the hospital  and ask for RAPS attending or backup

## 2018-07-30 NOTE — ANESTHESIA POST-OP FOLLOW-UP NOTE
REGIONAL ANESTHESIA PAIN SERVICE FOLLOW UP NOTE  SUBJECTIVE  Interval History: Overnight no acute events.  Patient reports pain improved, adequate relief with current analgesics.  She is very pleased she has been comfortable since nerve block infusion stopped/clamped yesterday afternoon.  Denies weakness, paresthesias, circumoral numbness, metallic taste or tinnitus.  Pt is tolerating diet, voiding, ambulating with assistance, denies nausea.  Patient is currently tolerating  diet                           Clinically Aligned Pain Assessment (CAPA):  Comfort (How is your pain?): Comfortably manageable  Change in Pain (Since your last medication/intervention?): Getting better  Pain Control (How are your pain treatments working?):  Fully effective pain control  Functioning (Are you able to do activities to get better?) : Can do most things, but pain gets in the way of some      Antithrombotic/Thrombolytic Therapy:  enoxaparin (LOVENOX) injection 30 mg 30 mg, SC, Q24H Given: 07/29 1006         Medications related to Pain Management (Future)    Start       Dose/Rate Route Frequency Ordered Stop     07/29/18 0000   diclofenac (FLECTOR) 1.3 % Patch       1 patch Transdermal 2 TIMES DAILY 07/29/18 1628        07/29/18 0000   acetaminophen (TYLENOL) 500 MG tablet       1,000 mg Oral EVERY 8 HOURS 07/29/18 1628 08/03/18 2359     07/27/18 0930   diclofenac (FLECTOR) 1.3 % Patch 1 patch       1 patch Transdermal 2 TIMES DAILY 07/27/18 0912        07/27/18 0915   diclofenac (FLECTOR) Patch in Place         Transdermal EVERY 8 HOURS 07/27/18 0912        07/26/18 2159   lidocaine 1 % 1 mL       1 mL Other EVERY 1 HOUR PRN 07/26/18 2159        07/26/18 2159   lidocaine (LMX4) cream         Topical EVERY 1 HOUR PRN 07/26/18 2159        07/26/18 0000   acetaminophen (TYLENOL) tablet 1,000 mg       1,000 mg Oral EVERY 8 HOURS 07/25/18 2309        07/25/18 2309   methocarbamol (ROBAXIN) tablet 500 mg       500 mg Oral 2 TIMES DAILY PRN  "07/25/18 2309 07/25/18 2309   oxyCODONE IR (ROXICODONE) half-tab 2.5-5 mg       2.5-5 mg Oral EVERY 3 HOURS PRN 07/25/18 2309 07/25/18 2309   polyethylene glycol (MIRALAX/GLYCOLAX) Packet 17 g       17 g Oral DAILY PRN 07/25/18 2309 07/25/18 2309   HYDROmorphone (DILAUDID) injection 0.2 mg       0.2 mg Intravenous EVERY 2 HOURS PRN 07/25/18 2309 07/25/18 2309   bisacodyl (DULCOLAX) Suppository 10 mg       10 mg Rectal DAILY PRN 07/25/18 2309                  OBJECTIVE  CBC RESULTS:        Recent Labs   Lab Test  07/29/18   0650  07/28/18   0716   WBC   --   8.5   RBC   --   3.86   HGB  9.8*  10.8*   HCT   --   34.3*   MCV   --   89   MCH   --   28.0   MCHC   --   31.5   RDW   --   14.3   PLT  257  283            Lab Results   Component Value Date     INR 1.05 07/25/2018     INR 1.13 04/04/2018     INR 1.18 04/03/2018     INR 1.11 04/02/2018         Vitals: Blood pressure 114/59, pulse 88, temperature 97.1  F (36.2  C), temperature source Oral, resp. rate 16, height 1.676 m (5' 6\"), weight 51.9 kg (114 lb 8 oz), SpO2 96 %, not currently breastfeeding.     Exam:  Constitutional: healthy, alert and no distress  Neuro/MSK:  Strength BLE 5/5  and overall symmetric  Skin: fascia iliaca site c/d/i, no tenderness, erythema, heme, edema.       ASSESSMENT/PLAN  Elizabeth Taylor is a 78 year old female POD #4 s/p ARTHROPLASTY HIP and placement of right fascia iliaca catheter for analgesia. Patient currently achieving adequate pain control with multimodal analgesia, nerve block stopped since yesterday afternoon.  Is meeting activity goals. No weakness or paresthesias. No evidence of adverse side effects associated with local anesthetic.      right fascia iliaca nerve block catheter removed today July 30, 2018 at 0830 without complication, dark tip intact.  Bandaid applied  - okay to continue enoxaparin Q 24 hrs as ordered - bedside RN aware  - will sign off  - please call [facility] with questions or " concerns    Anthony Whatley MD

## 2018-07-30 NOTE — PROGRESS NOTES
Social Work Services Discharge Note      Patient Name:  Elizabeth Taylor     Anticipated Discharge Date: 07/30/18 @ 1515    Discharge Disposition:   Facility: [facility]  PH: [phone]  F: [phone]  Nurse to Nurse Call: PH: [phone]    Following MD: Baron Seth  [address] [facility] [address]     Pre-Admission Screening (PAS) online form has been completed.  The Level of Care (LOC) is:  Determined  Confirmation Code is:  [id number]  Patient/caregiver informed of referral to [facility] for Pre-Admission Screening for skilled nursing facility (SNF) placement and to expect a phone call post discharge from SNF.     Additional Services/Equipment Arranged:    - Transportation: [facility] (PH: [phone]) stretcher scheduled for 1515.  Indication for stretcher transport is right femur fracture, hip injury and bed bound status with weight bearing precautions sitting and standing.  Pt and family are aware and in agreement that insurance may not cover stretcher transport: YES  PCS form completed prior to transport.     Patient / Family response to discharge plan:  Pt and family in agreement with the plan.     Persons notified of above discharge plan:  Pt, Family (Called Sister Sahil and Friend Katherine), bedside RN, Trauma team, PCP, SNF admissions.    Staff Discharge Instructions:  Please fax discharge orders and signed hard scripts for any controlled substances.  Please print a packet and send with patient.     CTS Handoff completed:  YES    Medicare Notice of Rights provided to the patient/family:  YES    GOMEZ Becerra, FERNANDOW  [address]   Phone: [phone]  Pager: [phone]  Unit: [phone]

## 2018-07-30 NOTE — PLAN OF CARE
Problem: Pain, Acute (Adult)  Goal: Identify Related Risk Factors and Signs and Symptoms  Related risk factors and signs and symptoms are identified upon initiation of Human [facility] Clinical Practice Guideline (CPG).   Vitals:    07/29/18 1555 07/29/18 1938 07/29/18 2251 07/30/18 0728   BP: 110/66 117/61 125/73 114/59   BP Location: Right arm Right arm Right arm Right arm   Pulse: 86 97 96 88   Resp: 16 16 16 16   Temp: 97  F (36.1  C) 98.3  F (36.8  C) 98.8  F (37.1  C) 97.1  F (36.2  C)   TempSrc: Oral Oral Oral Oral   SpO2: 96% 96% 95% 96%   Weight:       Height:       afebrile viral stable, sating 96% on room air, right hip dressing intact, CMS intact, abductor pillow in place,  Oxycodone for pain with relief, voiding frequently on bed pan, tolerating intake, worked with PT,   ON-Q pump removed, possible TCU per pt, continue with plan of care.

## 2018-07-30 NOTE — PROGRESS NOTES
"Palliative Care Inpatient Clinical Social Work Assessment    Patient Information:  Elizabeth is a 78 year old woman with a history of anal cancer status post chemoradiation. She is admitted due to a femoral neck fracture after a fall. She has a history of multiple falls. She underwent right hip hemiarthroplasty on 7/26. Palliative care team is following due to routine geriatric trauma consult. I am following to assess coping.     Visit Summary: I met with Elizabeth this afternoon for an initial visit. She shared her medical narrative with me, including going through chemoradiation and her multiple falls. She shared about the pain she has been having in her right foot. She requested a massage therapist for foot pain.  Plan was to place a palliative care massage referral but it looks like she will be discharging this afternoon and our massage therapist is here T/TH.     Relevant Symptoms/Concerns     Physical:  Right foot pain around the arch and ankle. She thinks movement helps but forgets to move around when she is in bed. She is wondering if there is a pain medication \"that can target my foot\" as she feels the medications she is currently taking are not helpful with her foot pain.    Psychological/Emotional/Existential:  As Elizabeth shared about her falls, she shared that this last fall she was surprised to find out that her bone had broken. 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 I attempted to reflect on this with her but she did not readily engage in processing. It seems that this last fall was emotionally challenging for her as she was by herself and trying to get around to contact her friend, Diya.    Family/Social/Caregiver:      Developmental:     Mental Health:     End of Life:     Cultural/Anabaptism/Spiritual:     Grief/Loss:     Concurrent Stressors:       Comments:      Strengths     Physical:    Psychological/Emotional/Existential:     Family/Social/Caregiver:  She tells me that " "she feels supported by her sister (Sahil) who she says is making all the arrangements for returning back to [facility]. She also mentions two friends (Katherine and Diya) and her brother-in-law (Hugo).    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 When I said that I heard she was a ballerina she expressed frustration with \"that rumor being started\". She explains that the title \"ballerina\" is the highest one can receive and that she is not technically a ballerina. However, she thoroughly enjoys teaching ballet.    Mental Health:     End of Life:     Cultural/Lutheran/Spiritual:     Grief/Loss:        Comments:      Goals/Decision Making/Advance Care Planning   Preferences:  Currently full code   Concerns:     Documents:  No HCD in EMR   Decision Making Issues:       Comments:  Elizabeth tells me that she is hoping and planning to return to [facility].     Resource Needs     Discharge Planning:  Per Unit/Program  and/or Care Coordinator   Other:     Comments:      Sources of Information   Patient:  X   Family:     Staff:  X   Chart Review:  X   Other:      Intervention (Check all that apply)    X   Assessment of palliative specific issues         Introduction of Palliative clinical social work interventions       Adjustment to illness counseling       Advanced care planning       Attended/participated in care conference       Behavioral interventions for symptom management    X   Facilitation of processing of thoughts/feelings       Family communication facilitated       Grief counseling       Goals of care discussion/facilitation       Life legacy work    X  Life review facilitation       Psychoeducation       Re-framing       Resource referral       Other:       Comments:  Elizabeth was receptive and appreciative of my visit. She is looking forward to returning to [facility].     Plan:  Plan is for her to return to [facility] this afternoon. No " further follow up planned this admission.        JEFF Rollins, [facility]    Pager: [phone]

## 2018-07-30 NOTE — PLAN OF CARE
Problem: Patient Care Overview  Goal: Plan of Care/Patient Progress Review  VSS. Patient had right foot x-ray today for mid foot pain. Complained of some soreness on left side of right knee with pressure from abductor pillow. Slight redness noted. Abductor pillow loosened and redness resolved. Slept intermittently during the shift. Has On-Q pump clamped per order. Received Oxy X 2 for pain. Kenisha area irritated. Eating multiple popsicles. Using the call light to use the bed pan. At times incontinent. Anticipated discharge tomorrow. Continue POC.

## 2018-07-30 NOTE — PLAN OF CARE
Problem: Patient Care Overview  Goal: Plan of Care/Patient Progress Review  Discharge Planner PT   Patient plan for discharge: TCU  Current status: Pt modA supine<>sit with extra PT support and explanation needed, she sits EOB and perform upper body and lower body dressing Kenneth from PT. She becomes very anxious near end of session about discharge papers and abandons PT plan to transfer to chair to await discharge.   Barriers to return to prior living situation: pain, decreased caregiver s    Physical Therapy Discharge Summary    Reason for therapy discharge:    Discharged to transitional care facility.    Progress towards therapy goal(s). See goals on Care Plan in [facility] electronic health record for goal details.  Goals not met.  Barriers to achieving goals:   limited tolerance for therapy and discharge from facility.    Therapy recommendation(s):    Continued therapy is recommended.  Rationale/Recommendations:  TCU for ongoing skilled therapy to progerss towards PLOF as pt remains needing assist for all mobility and cares..    upport, inaccessible home environment  Recommendations for discharge: TCU  Rationale for recommendations: pt remains dependent on assist for all mobility and would benefit from ongoing skilled therapy to promote IND mobility and progress towards PLOF.       Entered by: Luz Marina Ramires 07/30/2018 4:42 PM

## 2018-07-30 NOTE — ANESTHESIA POST-OP FOLLOW-UP NOTE
REGIONAL ANESTHESIA PAIN SERVICE CONTINUOUS NERVE INFUSION NOTE  Elizabeth Taylor is a 78 year old female POD #1 s/p ARTHROPLASTY HIP  ANESTHESIA OUT OF OR and placement of right fascia iliaca catheter for pain control.     SUBJECTIVE:  Interval History: Overnight no acute events.  Patient awake, reports it is difficult for her to move right foot and ankle, good pain control with nerve block continuous infusion and current analgesics (see below).  denies paresthesias, circumoral numbness, metallic taste or tinnitus. Patient has not been OOB yet.  Currently denies nausea or vomiting; feels hungry, tolerating diet as ordered.                             Clinically Aligned Pain Assessment (CAPA):   Comfort (How is your pain?): Comfortably manageable  Change in Pain (Since your last medication/intervention?): About the same  Pain Control (How are your pain treatments working?):  Fully effective pain control  Functioning (Are you able to do activities to get better?) : Can do most things, but pain gets in the way of some                               Numerical Rating Scale (NRS):  3/10 at rest and 5/10 with activity        Antithrombotic/Thrombolytic Therapy ordered:    enoxaparin (LOVENOX) injection 30 mg 30 mg, SC, Q24H Given: 07/27 1035            Medications related to Pain Management (Future)    Start       Dose/Rate Route Frequency Ordered Stop     07/26/18 2159   lidocaine 1 % 1 mL       1 mL Other EVERY 1 HOUR PRN 07/26/18 2159        07/26/18 2159   lidocaine (LMX4) cream         Topical EVERY 1 HOUR PRN 07/26/18 2159        07/26/18 1015   ROPivacaine 0.2% (NAROPIN) 750 mL in ON-Q C-Bloc select flow ([id number] holds 600-750 mL) single cath disposable pump       8 mL/hr  8 mL/hr  Irrigation Elastomeric Pump 07/26/18 1008        07/26/18 0000   acetaminophen (TYLENOL) tablet 1,000 mg       1,000 mg Oral EVERY 8 HOURS 07/25/18 2309        07/26/18 0000   Lidocaine (LIDOCARE) 4 % Patch 1 patch       1 patch  over  12 Hours Transdermal EVERY 24 HOURS 2000 07/25/18 2309 07/26/18 0000   lidocaine patch in PLACE         Transdermal EVERY 8 HOURS 07/25/18 2309 07/25/18 2309   methocarbamol (ROBAXIN) tablet 500 mg       500 mg Oral 2 TIMES DAILY PRN 07/25/18 2309 07/25/18 2309   oxyCODONE IR (ROXICODONE) half-tab 2.5-5 mg       2.5-5 mg Oral EVERY 3 HOURS PRN 07/25/18 2309 07/25/18 2309   polyethylene glycol (MIRALAX/GLYCOLAX) Packet 17 g       17 g Oral DAILY PRN 07/25/18 2309 07/25/18 2309   HYDROmorphone (DILAUDID) injection 0.2 mg       0.2 mg Intravenous EVERY 2 HOURS PRN 07/25/18 2309 07/25/18 2309   bisacodyl (DULCOLAX) Suppository 10 mg       10 mg Rectal DAILY PRN 07/25/18 2309                OBJECTIVE:  Lab results      Recent Labs   Lab Test  07/27/18   0759   WBC  7.4   RBC  3.98   HGB  11.3*   HCT  35.0   MCV  88   MCH  28.4   MCHC  32.3   RDW  13.8   PLT  329               Lab Results   Component Value Date     INR 1.05 07/25/2018     INR 1.13 04/04/2018     INR 1.18 04/03/2018            Vitals:              Temp:  [96  F (35.6  C)-97.4  F (36.3  C)] 96.8  F (36  C)  Heart Rate:  [61-98] 98  Resp:  [7-24] 18  BP: (102-179)/() 122/64  SpO2:  [92 %-100 %] 92 %     Exam:   GEN: alert and no distress  NEURO/MSK: Extent of sensory block tested with sharp tip: R) L3-S1  Strength LLE 5/5, RLE weakness consistent with fascia iliaca compartment block (FICB)  SKIN: right fascia iliaca catheter site with dressing c/d/i, no tenderness, erythema, heme, edema        ASSESSMENT/PLAN:    Patient is receiving adequate analgesia with current multimodal therapy including right fascia iliaca catheter infusion Ropivacaine 0.2% at 8 mL/hour. Motor function consistent with nerve block and adequate sensory block, is meeting activity goals.  No evidence of adverse side effects related to local anesthetic.      - continue current Ropivacaine 0.2% infusion rate 8 mL/hour  - expected change of  next On-Q pump is 2-3 days  - antithrombotic/thrombolytic therapy okay to administer enoxaparin subQ as ordered. Please contact [facility]APS (#[id number]) prior to any medication changes  - will continue to follow and adjust as needed     José Luis Mckeon IV, MD

## 2018-07-30 NOTE — ANESTHESIA POST-OP FOLLOW-UP NOTE
REGIONAL ANESTHESIA PAIN SERVICE CONTINUOUS NERVE INFUSION NOTE  Elizabeth Taylor is a 78 year old female POD #3 s/p ARTHROPLASTY HIP  ANESTHESIA OUT OF OR and placement of right fascia iliaca catheter for pain control.     SUBJECTIVE:  Interval History: Overnight events: No acute events.  Patient reports good pain control with nerve block continuous infusion and current analgesics (see below).  Denies weakness, paresthesias, circumoral numbness, metallic taste or tinnitus. Patient ambulating with assistance.  Currently denies nausea or vomiting; tolerating a regular diet.                             Clinically Aligned Pain Assessment (CAPA):   Comfort (How is your pain?): Comfortably manageable  Change in Pain (Since your last medication/intervention?): Getting better  Pain Control (How are your pain treatments working?):  Fully effective pain control  Functioning (Are you able to do activities to get better?) : Can do everything I need to do  Sleep (Does your pain management allow you to sleep or rest?): Normal sleep                           Numerical Rating Scale (NRS):  5/10 at rest and 5/10 with activity        Antithrombotic/Thrombolytic Therapy ordered:  Lovenox 30 mg subcutaneous q24hr      OBJECTIVE:  Lab results     Vitals:              Temp:  [95.9  F (35.5  C)-97.7  F (36.5  C)] 97.7  F (36.5  C)  Pulse:  [85-88] 85  Heart Rate:  [97] 97  Resp:  [16] 16  BP: (102-126)/(48-65) 125/59  SpO2:  [97 %-99 %] 98 %     Exam:   GEN: alert and no distress  Strength BLE 5/5  and overall symmetric, extremities normal- no gross deformities noted  SKIN: right fascia iliaca catheter site with dressing c/d/i, no tenderness, erythema, heme, edema        ASSESSMENT/PLAN:    Patient is receiving adequate analgesia with current multimodal therapy including  fascia iliaca catheter infusion ropivacaine 0.2% total infusion 8mL/hour. Good Motor function  and adequate sensory block,  meeting activity goals.  No evidence of  adverse side effects related to local anesthetic.      - CLAMP TRIAL for current ropivacaine 0.2% total infusion in anticipation of discharge to [facility] tomorrow (location per patient)  - antithrombotic/thrombolytic therapy  ordered. Plan: Please contact [facility] (#[id number]) prior to any medication changes  - will continue to follow and adjust as needed    José Luis Mckeon IV, MD

## 2018-07-30 NOTE — PLAN OF CARE
Problem: Patient Care Overview  Goal: Plan of Care/Patient Progress Review  Outcome: No Change  Vital signs:  Temp: 98.8  F (37.1  C) Temp src: Oral BP: 125/73 Pulse: 96   Resp: 16 SpO2: 95 % O2 Device: None (Room air)     VSS on RA. A&O x4, forgetful at times, however, calls appropriately. Left PIV SL. Right hip dressing c/d/i. +1 pulses to BLE. Abductor pillow in use. ON-Q pump @ clamped per orders. Did not request pain meds overnight. Voiding via bedpan, incontinent of urine at times. +BS, passing flatus, no BM overnight. Sleeping between cares. Possible discharge to TCU today.

## 2018-07-30 NOTE — ANESTHESIA POST-OP FOLLOW-UP NOTE
REGIONAL ANESTHESIA PAIN SERVICE CONTINUOUS NERVE INFUSION NOTE  Elizabeth Taylor is a 78 year old female POD #2 s/p ARTHROPLASTY HIP  ANESTHESIA OUT OF OR and placement of right fascia iliaca catheter for pain control.     SUBJECTIVE:  Interval History: Overnight events: No acute events.  Patient reports good pain control with nerve block continuous infusion and current analgesics (see below).  Denies weakness, paresthesias, circumoral numbness, metallic taste or tinnitus. Patient ambulating with assistance.  Currently denies nausea or vomiting; tolerating a regular diet.                             Clinically Aligned Pain Assessment (CAPA):   Comfort (How is your pain?): Comfortably manageable  Change in Pain (Since your last medication/intervention?): Getting better  Pain Control (How are your pain treatments working?):  Fully effective pain control  Functioning (Are you able to do activities to get better?) : Can do everything I need to do  Sleep (Does your pain management allow you to sleep or rest?): Normal sleep                           Numerical Rating Scale (NRS):  1/10 at rest and 2/10 with activity        Antithrombotic/Thrombolytic Therapy ordered:  Lovenox 30 mg subcutaneous q24hr     Medications related to Pain Management (Future)    Start       Dose/Rate Route Frequency Ordered Stop     07/27/18 0930   diclofenac (FLECTOR) 1.3 % Patch 1 patch       1 patch Transdermal 2 TIMES DAILY 07/27/18 0912        07/27/18 0915   diclofenac (FLECTOR) Patch in Place         Transdermal EVERY 8 HOURS 07/27/18 0912        07/26/18 2159   lidocaine 1 % 1 mL       1 mL Other EVERY 1 HOUR PRN 07/26/18 2159        07/26/18 2159   lidocaine (LMX4) cream         Topical EVERY 1 HOUR PRN 07/26/18 2159        07/26/18 1015   ROPivacaine 0.2% (NAROPIN) 750 mL in ON-Q C-Bloc select flow ([id number] holds 600-750 mL) single cath disposable pump       8 mL/hr  8 mL/hr  Irrigation Elastomeric Pump 07/26/18 1008        07/26/18  0000   acetaminophen (TYLENOL) tablet 1,000 mg       1,000 mg Oral EVERY 8 HOURS 07/25/18 2309 07/25/18 2309   methocarbamol (ROBAXIN) tablet 500 mg       500 mg Oral 2 TIMES DAILY PRN 07/25/18 2309 07/25/18 2309   oxyCODONE IR (ROXICODONE) half-tab 2.5-5 mg       2.5-5 mg Oral EVERY 3 HOURS PRN 07/25/18 2309 07/25/18 2309   polyethylene glycol (MIRALAX/GLYCOLAX) Packet 17 g       17 g Oral DAILY PRN 07/25/18 2309 07/25/18 2309   HYDROmorphone (DILAUDID) injection 0.2 mg       0.2 mg Intravenous EVERY 2 HOURS PRN 07/25/18 2309 07/25/18 2309   bisacodyl (DULCOLAX) Suppository 10 mg       10 mg Rectal DAILY PRN 07/25/18 2309                OBJECTIVE:  Lab results      Recent Labs   Lab Test  07/28/18   0716   WBC  8.5   RBC  3.86   HGB  10.8*   HCT  34.3*   MCV  89   MCH  28.0   MCHC  31.5   RDW  14.3   PLT  283               Lab Results   Component Value Date     INR 1.05 07/25/2018     INR 1.13 04/04/2018     INR 1.18 04/03/2018            Vitals:              Temp:  [97.6  F (36.4  C)-99.1  F (37.3  C)] 97.6  F (36.4  C)  Heart Rate:  [89-98] 89  Resp:  [16-18] 16  BP: (104-130)/(52-56) 130/52  SpO2:  [93 %-96 %] 94 %     Exam:   GEN: alert and no distress  Strength BLE 5/5  and overall symmetric, extremities normal- no gross deformities noted  SKIN: right fascia iliaca catheter site with dressing c/d/i, no tenderness, erythema, heme, edema        ASSESSMENT/PLAN:    Patient is receiving adequate analgesia with current multimodal therapy including  fascia iliaca catheter infusion ropivacaine 0.2% total infusion 8mL/hour. Good Motor function  and adequate sensory block,  meeting activity goals.  No evidence of adverse side effects related to local anesthetic.      - continue current ropivacaine 0.2% total infusion rate 8 mL/hour  - Please contact RAPS service a day prior to plan discharge date so infusion can be stopped and pain control assessed without block in place  -  antithrombotic/thrombolytic therapy  ordered. Plan: Please contact [facility]APS (#[id number]) prior to any medication changes  - will continue to follow and adjust as needed  José Luis Mckeon IV, MD

## 2018-07-30 NOTE — ADDENDUM NOTE
Addendum  created 07/29/18 2051 by José Luis Mckeon MD    Cosign clinical note with attestation, Sign clinical note

## 2018-07-30 NOTE — PLAN OF CARE
Problem: Patient Care Overview  Goal: Plan of Care/Patient Progress Review  Vitals:    07/29/18 1938 07/29/18 2251 07/30/18 0728 07/30/18 1314   BP: 117/61 125/73 114/59 121/67   BP Location: Right arm Right arm Right arm Right arm   Pulse: 97 96 88 90   Resp: 16 16 16 16   Temp: 98.3  F (36.8  C) 98.8  F (37.1  C) 97.1  F (36.2  C) 96.9  F (36.1  C)   TempSrc: Oral Oral Oral Axillary   SpO2: 96% 95% 96% 97%   Weight:       Height:       afebrile vital stable sating 97% on room air, right hip dressing intact, oxy with relief, did worked with PT and OT  CMS is intact, per area is reddened, ointment applied, frequent urination, tolerating intake,   Discharge script written TCU at 1515.    Nurse to nurse report given to the facility,  5 mg Oxy given prior transport, pt discharged with transport.

## 2018-07-30 NOTE — PROGRESS NOTES
Orthopaedic Surgery Progress Note:       Subjective:   NAEO. Pain is controlled on oral meds. Tolerating oral diet. Voiding+/bm+. Denies fever/chills/SOB/chest pain/nause/vomiting/new numbness/tinging.    Ambulated once yesterday, wants to ambulate more.     Objective:   Temp:  [97  F (36.1  C)-98.8  F (37.1  C)] 97.1  F (36.2  C)  Pulse:  [86-97] 88  Resp:  [16] 16  BP: (110-125)/(59-73) 114/59  SpO2:  [95 %-98 %] 96 %    Intake/Output Summary (Last 24 hours) at 07/27/18 0753  Last data filed at 07/27/18 0720   Gross per 24 hour   Intake             1684 ml   Output              780 ml   Net              904 ml       Gen: NAD. Resting comfortably in bed  Resp: Breathing comfortably on room air  RLE:  aquacel is c/d/i, 1 minimal scant drainage (2-4mm). Abduction pillow in place.  SILT in femoral, saphenous, sural, deep peroneal, superficial peroneal, and tibial n dist.   Fires EHL/FHL/TA/Gastroc with 4/5 strength      Foot skin intact, not swollen, nonerythematous, nontender to palpation at posterior/lateral/medial malleolus, weakly able to dorsiflex/plantar flexion intact, tender to palpation proximal anterior midfoot, is wwp    Labs:    Recent Labs  Lab 07/29/18  0650 07/28/18  0716 07/27/18  0759 07/26/18  0652   WBC  --  8.5 7.4 5.7   HGB 9.8* 10.8* 11.3* 11.9    283 329 348     Cultures  7/26 OR cultures - NGTD    IMAGING  7/29/2018 R foot x-rays:IMPRESSION: No definite evidence of acute osseous abnormalities in the limited examination due to patient's marked osteopenia.       Assessment & Plan:   Post-Op Plan:  Assessment/Plan: Elizabeth Taylor is a 78 year old female with right femoral neck fracture now s/p right hip hemiarthroplasty on 7/26 with Dr. Phan.    7/27 - C. Diff positive.  7/29 - tender to palpation at R anterior foot, x-rays negative, cam boot for comfort order placed.     Trauma Primary  Activity: Up with assist and assistive devices as needed until independent. Posterior hip  precautions x 3 months.   Weight bearing status: WBAT  ABD Pillow at all times when at rest and in bed for 6 weeks  Antibiotics: Clinda x 24 hours. -- C diff positive; abx per primary team  Diet: adat. Bowel regimen. Anti-emetics PRN.   DVT prophylaxis: Lovenox 30mg Daily x 4 weeks  Wound Care: Aquacel x 7 days.  See wound care instructions below for patient.  Pain management: Orals as needed. IV for breakthrough pain only.   X-rays: AP Pelvis and Lateral R hip in PACU. Completed. R foot x-rays completed.  Physical Therapy: Mobilization, ROM, ADL's, Posterior hip precautions.    Occupational Therapy: ADL's  Labs: Trend Hgb on POD #1, 2.   Consults: PT, OT. Medicine, SW/CC  Follow-up: Clinic in 2 weeks for wound check, message for outpatient follow with Dr. Phan sent.     Disposition: okay to GA from ortho perspective, will sign off on pt.     Wound Care instructions for patient: Your incision was closed with sutures underneath the skin. If you have a brown/tan rubber-like dressing (called Aquacel) applied to your surgical site, you may shower over this and pat dry afterward. You may remove the dressing 1 week after surgery. You may replace this with gauze and tape. If you would like to keep covered, change the bandages every other day and keep wound clean and dry. Showering is allowed if your incision is dry. No scrubbing or submerging your incision in standing water such as a bath x 4 weeks. Steri-strips (small white tape that is directly on the incision areas), if present, should be left on until they fall off or are removed at your first office visit. You may also note strands of suture from each end of your incision - this is normal and is a knotless type of suture that can be trimmed at its base around 2 weeks from your surgery, otherwise it may be left to fall off on its own.    Assessment and plan discussed with Dr. Phan.    Boris Cheng MD  Orthopaedic Surgery Resident, PGY1   Pager: [phone]

## 2018-07-31 ENCOUNTER — PATIENT OUTREACH (OUTPATIENT)
Dept: CARE COORDINATION | Facility: CLINIC | Age: 78
End: 2018-07-31

## 2018-07-31 ENCOUNTER — CARE COORDINATION (OUTPATIENT)
Dept: CARE COORDINATION | Facility: CLINIC | Age: 78
End: 2018-07-31

## 2018-07-31 DIAGNOSIS — S72.009A FEMORAL NECK FRACTURE (H): Primary | ICD-10-CM

## 2018-07-31 NOTE — PLAN OF CARE
Problem: Patient Care Overview  Goal: Plan of Care/Patient Progress Review  Discharge Planner OT   Patient plan for discharge: TCU  Current status: Pt able to complete bed mobility with increased time, min A, and extensive cuing/education. Pt stood with min A and able to complete 4 steps forward then backward with VCs, CGA, and FWW. Pt refused further OOB activity including transfer to chair despite extensive encouragement/education due to anxiety/pain.  Barriers to return to prior living situation: weakness, pain, surgical precautions, impaired balance, anxiety  Recommendations for discharge: TCU  Rationale for recommendations: to increase pt's (I) with ADL/IADLs       Entered by: Kandy Yanez 07/30/2018 8:03 PM      Occupational Therapy Discharge Summary    Reason for therapy discharge:    Discharged to transitional care facility.    Progress towards therapy goal(s). See goals on Care Plan in [facility] electronic health record for goal details.  Goals partially met.  Barriers to achieving goals:   discharge from facility.    Therapy recommendation(s):    Continued therapy is recommended.  Rationale/Recommendations:  continued OT at TCU to increase pt's (I) with functional transfers and ADLs.

## 2018-07-31 NOTE — PROGRESS NOTES
Discharge Disposition:   Facility: [facility]  PH: [phone]  F: [phone]  Nurse to Nurse Call: PH: [phone]    Patient to discharged to Care Facility   No post discharge call made

## 2018-07-31 NOTE — PROGRESS NOTES
Clinic Care Coordination Contact  Care Coordination Transition Communication    Referral Source: IP Report    Clinical Data: Patient was hospitalized at [facility]  from 7/25 to 7/30/2018 with diagnosis of Femoral neck fracture due to a fall   Right Hip Hemiarthroplasty 7/26/2018     Transition to Facility:              Facility Name: [facility]              Contact name and phone number/fax: [phone]  Plan: RN/SW Care Coordinator will await notification from facility staff informing RN/SW Care Coordinator of patient's discharge plans/needs. RN/SW Care Coordinator will review chart and outreach to facility staff every 4 weeks and as needed.     Quynh Mckeon RN / Clinical Care Coordinator     [facility]    [facility]   [email or web address]  Office :  [phone] / Fax :  [phone]

## 2018-08-02 LAB
BACTERIA SPEC CULT: NORMAL
Lab: NORMAL
SPECIMEN SOURCE: NORMAL

## 2018-08-14 ENCOUNTER — PATIENT OUTREACH (OUTPATIENT)
Dept: CARE COORDINATION | Facility: CLINIC | Age: 78
End: 2018-08-14

## 2018-08-14 DIAGNOSIS — S72.009A FEMORAL NECK FRACTURE (H): Primary | ICD-10-CM

## 2018-08-14 NOTE — PROGRESS NOTES
Clinic Care Coordination Contact    Situation: Patient chart reviewed by care coordinator.    Background: Clinical Data: Patient was hospitalized at [facility]  from 7/25 to 7/30/2018 with diagnosis of Femoral neck fracture due to a fall   Right Hip Hemiarthroplasty 7/26/2018     Assessment:Patient continues to be at the [facility]     Plan/Recommendations: CC will follow up when discharged from TCU     Quynh Mckeon RN / Clinical Care Coordinator     [facility]   [email or web address]  Office :  [phone] / Fax :  [phone]

## 2018-08-21 ENCOUNTER — OFFICE VISIT (OUTPATIENT)
Dept: ORTHOPEDICS | Facility: CLINIC | Age: 78
End: 2018-08-21
Payer: MEDICARE

## 2018-08-21 ENCOUNTER — RADIANT APPOINTMENT (OUTPATIENT)
Dept: GENERAL RADIOLOGY | Facility: CLINIC | Age: 78
End: 2018-08-21
Attending: ORTHOPAEDIC SURGERY
Payer: MEDICARE

## 2018-08-21 VITALS — BODY MASS INDEX: 18.32 KG/M2 | HEIGHT: 66 IN | WEIGHT: 114 LBS

## 2018-08-21 DIAGNOSIS — M25.551 HIP PAIN, RIGHT: Primary | ICD-10-CM

## 2018-08-21 DIAGNOSIS — M25.551 HIP PAIN, RIGHT: ICD-10-CM

## 2018-08-21 DIAGNOSIS — S72.001A CLOSED FRACTURE OF NECK OF RIGHT FEMUR, INITIAL ENCOUNTER (H): Primary | ICD-10-CM

## 2018-08-21 ASSESSMENT — ENCOUNTER SYMPTOMS
SPEECH CHANGE: 1
LOSS OF CONSCIOUSNESS: 0
SLEEP DISTURBANCES DUE TO BREATHING: 0
EYE IRRITATION: 1
MYALGIAS: 1
HYPOTENSION: 0
ORTHOPNEA: 0
DIFFICULTY URINATING: 0
JOINT SWELLING: 0
EYE WATERING: 1
LEG PAIN: 0
WEAKNESS: 1
LIGHT-HEADEDNESS: 0
TREMORS: 0
NECK PAIN: 0
EYE REDNESS: 0
EXERCISE INTOLERANCE: 0
HEADACHES: 0
DYSURIA: 0
MUSCLE WEAKNESS: 1
SEIZURES: 0
ARTHRALGIAS: 0
DIZZINESS: 0
MEMORY LOSS: 1
DISTURBANCES IN COORDINATION: 0
SYNCOPE: 0
TINGLING: 0
BACK PAIN: 1
HEMATURIA: 0
DOUBLE VISION: 0
NUMBNESS: 1
STIFFNESS: 1
MUSCLE CRAMPS: 1
PALPITATIONS: 0
EYE PAIN: 0
FLANK PAIN: 0
PARALYSIS: 0
HYPERTENSION: 0

## 2018-08-21 NOTE — NURSING NOTE
"Reason For Visit: No chief complaint on file.      Primary MD: Baron Seth  Ref. MD: Dr. Phan    ?  No.  Date of surgery: 7/26/2018  Type of surgery: Right Hip Hemiarthroplasty   Smoker: No  Request smoking cessation information: No    Ht 1.676 m (5' 6\")  Wt 51.7 kg (114 lb)  BMI 18.4 kg/m2    Pain Assessment  Patient Currently in Pain: Yes  Primary Pain Location: Hip (low back pain)  Pain Orientation: Right  Pain Descriptors: Penetrating, Aching (swelling in feet- Milk massage helps- Patient would like to get more milk massages for ankle/feet swelling.   Alleviating Factors: Pain medication  Aggravating Factors: Movement, Walking, Stairs, Standing    Oswestry (NAVJOT) Questionnaire    No flowsheet data found.         Neck Disability Index (NDI) Questionnaire    No flowsheet data found.           Visual Analog Pain Scale  Back Pain Scale 0-10: 0  Right leg pain: 0  Left leg pain: 0    Promis 10 Assessment    No flowsheet data found.             Lucia Espinoza, ATC    "

## 2018-08-21 NOTE — LETTER
8/21/2018      RE: Elizabeth Taylor  [address] [address]       Spine Surgery Return Clinic Visit      Chief Complaint:   No chief complaint on file.      Interval HPI:  Symptom Profile Including: location of symptoms, onset, severity, exacerbating/alleviating factors, previous treatments:        Elizabeth Taylor is a 78 year old female who underwent a right hip hemiarthroplasty for displaced femoral neck fracture.  She returns today for a wound check.    On interview today she says she has no hip pain.  She has been ambulating with the assistance of a walker or with physical therapy.  She is currently in a nursing home.  She has been dealing with some lower extremity edema.  She had a duplex ultrasound last week.  I do not have the report with me here today but by report from the patient it was negative.  She has been continued on Lovenox.            Past Medical History:     Past Medical History:   Diagnosis Date     Anal cancer (H)      Endometriosis, site unspecified      Thyroiditis, unspecified     Thryoiditis-resolved            Past Surgical History:     Past Surgical History:   Procedure Laterality Date     APPENDECTOMY      as a child     ARTHROPLASTY HIP Right 7/26/2018    Procedure: ARTHROPLASTY HIP;  Right Hip Hemiarthroplasty;  Surgeon: Zaire Phan MD;  Location: [facility] OR     COLONOSCOPY N/A 4/13/2017    Procedure: COLONOSCOPY;  Surgeon: Juan Dos Santos MD;  Location: [facility] GI     INSERT PORT VASCULAR ACCESS Right 2/8/2018    Procedure: INSERT PORT VASCULAR ACCESS;  Place Single Lumen Venous Chest Port Placement;  Surgeon: Ziare Moreira PA-C;  [facility]     SURGICAL HISTORY OF -       endometriosis            Social History:     Social History   Substance Use Topics     Smoking status: Never Smoker     Smokeless tobacco: Never Used     Alcohol use No            Family History:     Family History   Problem Relation Age of Onset      "Cancer Sister      Cancer Maternal Grandmother      lymphoma     HEART DISEASE Father      MI     Uterine Cancer Niece             Allergies:     Allergies   Allergen Reactions     Darvon [Propoxyphene]      Had reaction in teen years     Penicillins      As a child     Ketoconazole Rash            Medications:     Current Outpatient Prescriptions   Medication     artificial saliva (BIOTENE DRY MOUTHWASH) LIQD liquid     calcium-vitamin D (CALTRATE) 600-400 MG-UNIT per tablet     diclofenac (FLECTOR) 1.3 % Patch     dimethicone 1.3 % LOTN lotion     enoxaparin (LOVENOX) 30 MG/0.3ML injection     menthol-zinc oxide (CALMOSEPTINE) 0.44-20.625 % OINT ointment     methocarbamol (ROBAXIN) 500 MG tablet     multivitamin, therapeutic (THERA-VIT) TABS tablet     oxyCODONE IR (ROXICODONE) 5 MG tablet     simethicone (MYLICON) 40 MG/0.6ML suspension     vitamin B complex with vitamin C (VITAMIN  B COMPLEX) TABS tablet     zinc oxide (DESITIN) 40 % ointment     No current facility-administered medications for this visit.              Review of Systems:   A focused musculoskeletal and neurologic ROS was performed with pertinent positives and negatives noted in the HPI.  Additional systems were also reviewed and are documented at the bottom of the note.         Physical Exam:   Vitals: Ht 1.676 m (5' 6\")  Wt 51.7 kg (114 lb)  BMI 18.4 kg/m2  Musculoskeletal, Neurologic, and Spine:     Incision is well-healed.  Neurovascular intact superficial peroneal deep peroneal and tibial nerve distributions.         Imaging:   We ordered and independently reviewed new radiographs at this clinic visit. The results were discussed with the patient. Findings include:     AP and lateral pelvis and right hip radiographs ordered and reviewed August 21, 2018 show well-positioned implants with no evidence of complication.       Assessment and Plan:     78 year old female 1 month status post right hip hemiarthroplasty for subacute femoral neck " fracture.  She has regained the ability to ambulate, albeit requiring assistance.  I think this is a positive for her.  I will defer the management of her lower extremity edema to the medical team.  She is currently using compression stockings, diuretics and she had a negative duplex ultrasound.  I think she can stop the Lovenox in 2 weeks.  I recommended she continue to increase her ambulation as tolerated with up to observation of posterior hip precautions and fall precautions.  Follow-up in 3 months with AP pelvis and lateral right hip radiographs.       Respectfully,  Zaire Phan MD  Spine Surgery  [facility]

## 2018-08-21 NOTE — LETTER
8/21/2018       RE: Elizabeth Taylor  [address] [address]     Dear Colleague,    Thank you for referring your patient, Elizabeth Taylor, to the [facility] Please see a copy of my visit note below.    Spine Surgery Return Clinic Visit      Chief Complaint:   No chief complaint on file.      Interval HPI:  Symptom Profile Including: location of symptoms, onset, severity, exacerbating/alleviating factors, previous treatments:        Elizabeth Taylor is a 78 year old female who underwent a right hip hemiarthroplasty for displaced femoral neck fracture.  She returns today for a wound check.    On interview today she says she has no hip pain.  She has been ambulating with the assistance of a walker or with physical therapy.  She is currently in a nursing home.  She has been dealing with some lower extremity edema.  She had a duplex ultrasound last week.  I do not have the report with me here today but by report from the patient it was negative.  She has been continued on Lovenox.            Past Medical History:     Past Medical History:   Diagnosis Date     Anal cancer (H)      Endometriosis, site unspecified      Thyroiditis, unspecified     Thryoiditis-resolved            Past Surgical History:     Past Surgical History:   Procedure Laterality Date     APPENDECTOMY      as a child     ARTHROPLASTY HIP Right 7/26/2018    Procedure: ARTHROPLASTY HIP;  Right Hip Hemiarthroplasty;  Surgeon: Zaire Phan MD;  [facility]     COLONOSCOPY N/A 4/13/2017    Procedure: COLONOSCOPY;  Surgeon: Juan Dos Santos MD;  Location: [facility] GI     INSERT PORT VASCULAR ACCESS Right 2/8/2018    Procedure: INSERT PORT VASCULAR ACCESS;  Place Single Lumen Venous Chest Port Placement;  Surgeon: Zaire Moreira PA-C;  [facility]     SURGICAL HISTORY OF -       endometriosis            Social History:  "    Social History   Substance Use Topics     Smoking status: Never Smoker     Smokeless tobacco: Never Used     Alcohol use No            Family History:     Family History   Problem Relation Age of Onset     Cancer Sister      Cancer Maternal Grandmother      lymphoma     HEART DISEASE Father      MI     Uterine Cancer Niece             Allergies:     Allergies   Allergen Reactions     Darvon [Propoxyphene]      Had reaction in teen years     Penicillins      As a child     Ketoconazole Rash            Medications:     Current Outpatient Prescriptions   Medication     artificial saliva (BIOTENE DRY MOUTHWASH) LIQD liquid     calcium-vitamin D (CALTRATE) 600-400 MG-UNIT per tablet     diclofenac (FLECTOR) 1.3 % Patch     dimethicone 1.3 % LOTN lotion     enoxaparin (LOVENOX) 30 MG/0.3ML injection     menthol-zinc oxide (CALMOSEPTINE) 0.44-20.625 % OINT ointment     methocarbamol (ROBAXIN) 500 MG tablet     multivitamin, therapeutic (THERA-VIT) TABS tablet     oxyCODONE IR (ROXICODONE) 5 MG tablet     simethicone (MYLICON) 40 MG/0.6ML suspension     vitamin B complex with vitamin C (VITAMIN  B COMPLEX) TABS tablet     zinc oxide (DESITIN) 40 % ointment     No current facility-administered medications for this visit.              Review of Systems:   A focused musculoskeletal and neurologic ROS was performed with pertinent positives and negatives noted in the HPI.  Additional systems were also reviewed and are documented at the bottom of the note.         Physical Exam:   Vitals: Ht 1.676 m (5' 6\")  Wt 51.7 kg (114 lb)  BMI 18.4 kg/m2  Musculoskeletal, Neurologic, and Spine:     Incision is well-healed.  Neurovascular intact superficial peroneal deep peroneal and tibial nerve distributions.         Imaging:   We ordered and independently reviewed new radiographs at this clinic visit. The results were discussed with the patient. Findings include:     AP and lateral pelvis and right hip radiographs ordered and " reviewed August 21, 2018 show well-positioned implants with no evidence of complication.       Assessment and Plan:     78 year old female 1 month status post right hip hemiarthroplasty for subacute femoral neck fracture.  She has regained the ability to ambulate, albeit requiring assistance.  I think this is a positive for her.  I will defer the management of her lower extremity edema to the medical team.  She is currently using compression stockings, diuretics and she had a negative duplex ultrasound.  I think she can stop the Lovenox in 2 weeks.  I recommended she continue to increase her ambulation as tolerated with up to observation of posterior hip precautions and fall precautions.  Follow-up in 3 months with AP pelvis and lateral right hip radiographs.       Again, thank you for allowing me to participate in the care of your patient.      Sincerely,    Zaire Phan MD

## 2018-08-21 NOTE — MR AVS SNAPSHOT
After Visit Summary   8/21/2018    Elizabeth Taylor    MRN: [id number]           Patient Information     Date Of Birth          1940        Visit Information        Provider Department      8/21/2018 10:00 AM Zaire Phan MD [facility]        Today's Diagnoses     Closed fracture of neck of right femur, initial encounter (H)    -  1       Follow-ups after your visit        Your next 10 appointments already scheduled     Aug 31, 2018 11:00 AM CDT   [facility] Draw with  [facility]RAW   [facility] Draw ([facility])    [address] [place] [address]  [address] [place] [id number]   [phone]            Aug 31, 2018 11:30 AM CDT   (Arrive by 11:15 AM)   Return Visit with Shen Ray MD   [facility] ([facility])    [address] [place] [address]  [address] [place] [id number] [phone]            Aug 31, 2018  1:00 PM CDT   Return Visit with Zaire Madison MD   Radiation Oncology Clinic ([facility])    [facility]  1st Floor  [address] [facility] [id number] [phone]              Who to contact     Please call your clinic at [phone] to:    Ask questions about your health    Make or cancel appointments    Discuss your medicines    Learn about your test results    Speak to your doctor            Additional Information About Your Visit        [employer] Information     [employer] gives you secure access to your electronic health record. If you see a primary care provider, you can also send messages to your care team and make appointments. If you have questions, please call your primary care clinic.  If you do not have a primary care provider, please call [phone] and they will assist you.      [employer] is an electronic gateway that provides easy, online access to your medical records. With [employer], you can request a  "clinic appointment, read your test results, renew a prescription or communicate with your care team.     To access your existing account, please contact your [facility] or call [phone] for assistance.        Care EveryWhere ID     This is your Care EveryWhere ID. This could be used by other organizations to access your [facility] medical records  [id number]        Your Vitals Were     Height BMI (Body Mass Index)                1.676 m (5' 6\") 18.4 kg/m2           Blood Pressure from Last 3 Encounters:   07/30/18 121/67   07/05/18 104/62   06/27/18 119/76    Weight from Last 3 Encounters:   08/21/18 51.7 kg (114 lb)   07/28/18 51.9 kg (114 lb 8 oz)   07/05/18 52.6 kg (116 lb)              Today, you had the following     No orders found for display       Primary Care Provider Office Phone # Fax #    Baron Seth [phone]       [address] [facility] [address]        Equal Access to Services     [facility] : Hadii aad ku hadasho Soomaali, waaxda luqadaha, qaybta kaalmada adeegyada, cyndy muñoz hayelly walton . So [facility] [phone].    ATENCIÓN: Si habla español, tiene a ghosh disposición servicios gratuitos de asistencia lingüística. Llame al [phone].    We comply with applicable federal civil rights laws and [place] laws. We do not discriminate on the basis of race, color, national origin, age, disability, sex, sexual orientation, or gender identity.            Thank you!     Thank you for choosing [facility]  for your care. Our goal is always to provide you with excellent care. Hearing back from our patients is one way we can continue to improve our services. Please take a few minutes to complete the written survey that you may receive in the mail after your visit with us. Thank you!             Your Updated Medication List - Protect others around you: Learn how to safely use, store and throw away your medicines at " www.disposemymeds.org.          This list is accurate as of 8/21/18 10:48 AM.  Always use your most recent med list.                   Brand Name Dispense Instructions for use Diagnosis    artificial saliva Liqd liquid      Swish and spit 5-10 mLs in mouth 4 times daily as needed for dry mouth    Mouth dryness       calcium-vitamin D 600-400 MG-UNIT per tablet    CALTRATE    60 tablet    Take 1 tablet by mouth daily    Age-related osteoporosis with current pathological fracture, initial encounter       diclofenac 1.3 % Patch    FLECTOR     Place 1 patch onto the skin 2 times daily    Closed fracture of neck of right femur, initial encounter (H)       dimethicone 1.3 % Lotn lotion      Externally apply topically 2 times daily    Wound of buttock, unspecified laterality, subsequent encounter       enoxaparin 30 MG/0.3ML injection    LOVENOX     Inject 0.3 mLs (30 mg) Subcutaneous every 24 hours for 28 days    Closed displaced fracture of neck of right femur (H)       menthol-zinc oxide 0.44-20.625 % Oint ointment    CALMOSEPTINE    1 Tube    Apply thick layer to irritated perianal skin 4 times per day and after bowel movements.    Malignant neoplasm of anal canal (H)       methocarbamol 500 MG tablet    ROBAXIN    120 tablet    Take 1 tablet (500 mg) by mouth 2 times daily as needed for muscle spasms    Closed fracture of one rib of right side, initial encounter       multivitamin, therapeutic Tabs tablet      Take 1 tablet by mouth daily    Age-related osteoporosis with current pathological fracture, initial encounter       oxyCODONE IR 5 MG tablet    ROXICODONE    20 tablet    Take 0.5-1 tablets (2.5-5 mg) by mouth every 3 hours as needed for moderate to severe pain    Closed displaced fracture of neck of right femur (H)       simethicone 40 MG/0.6ML suspension    MYLICON     Take 0.6 mLs (40 mg) by mouth every 6 hours as needed for cramping    Malignant neoplasm of anal canal (H), Flatulence, eructation and gas  pain       vitamin B complex with vitamin C Tabs tablet      Take 1 tablet by mouth daily        zinc oxide 40 % ointment    DESITIN     Apply topically 2 times daily    Wound of buttock, unspecified laterality, subsequent encounter

## 2018-08-21 NOTE — PROGRESS NOTES
Spine Surgery Return Clinic Visit      Chief Complaint:   No chief complaint on file.      Interval HPI:  Symptom Profile Including: location of symptoms, onset, severity, exacerbating/alleviating factors, previous treatments:        Elizabeth Taylor is a 78 year old female who underwent a right hip hemiarthroplasty for displaced femoral neck fracture.  She returns today for a wound check.    On interview today she says she has no hip pain.  She has been ambulating with the assistance of a walker or with physical therapy.  She is currently in a nursing home.  She has been dealing with some lower extremity edema.  She had a duplex ultrasound last week.  I do not have the report with me here today but by report from the patient it was negative.  She has been continued on Lovenox.            Past Medical History:     Past Medical History:   Diagnosis Date     Anal cancer (H)      Endometriosis, site unspecified      Thyroiditis, unspecified     Thryoiditis-resolved            Past Surgical History:     Past Surgical History:   Procedure Laterality Date     APPENDECTOMY      as a child     ARTHROPLASTY HIP Right 7/26/2018    Procedure: ARTHROPLASTY HIP;  Right Hip Hemiarthroplasty;  Surgeon: Zaire Phan MD;  Location: [facility] OR     COLONOSCOPY N/A 4/13/2017    Procedure: COLONOSCOPY;  Surgeon: Juan Dos Santos MD;  Location: [facility] GI     INSERT PORT VASCULAR ACCESS Right 2/8/2018    Procedure: INSERT PORT VASCULAR ACCESS;  Place Single Lumen Venous Chest Port Placement;  Surgeon: Zaire Moreira PA-C;  Location: [facility] OR     SURGICAL HISTORY OF -       endometriosis            Social History:     Social History   Substance Use Topics     Smoking status: Never Smoker     Smokeless tobacco: Never Used     Alcohol use No            Family History:     Family History   Problem Relation Age of Onset     Cancer Sister      Cancer Maternal Grandmother      lymphoma     HEART DISEASE Father      MI  "    Uterine Cancer Niece             Allergies:     Allergies   Allergen Reactions     Darvon [Propoxyphene]      Had reaction in teen years     Penicillins      As a child     Ketoconazole Rash            Medications:     Current Outpatient Prescriptions   Medication     artificial saliva (BIOTENE DRY MOUTHWASH) LIQD liquid     calcium-vitamin D (CALTRATE) 600-400 MG-UNIT per tablet     diclofenac (FLECTOR) 1.3 % Patch     dimethicone 1.3 % LOTN lotion     enoxaparin (LOVENOX) 30 MG/0.3ML injection     menthol-zinc oxide (CALMOSEPTINE) 0.44-20.625 % OINT ointment     methocarbamol (ROBAXIN) 500 MG tablet     multivitamin, therapeutic (THERA-VIT) TABS tablet     oxyCODONE IR (ROXICODONE) 5 MG tablet     simethicone (MYLICON) 40 MG/0.6ML suspension     vitamin B complex with vitamin C (VITAMIN  B COMPLEX) TABS tablet     zinc oxide (DESITIN) 40 % ointment     No current facility-administered medications for this visit.              Review of Systems:   A focused musculoskeletal and neurologic ROS was performed with pertinent positives and negatives noted in the HPI.  Additional systems were also reviewed and are documented at the bottom of the note.         Physical Exam:   Vitals: Ht 1.676 m (5' 6\")  Wt 51.7 kg (114 lb)  BMI 18.4 kg/m2  Musculoskeletal, Neurologic, and Spine:     Incision is well-healed.  Neurovascular intact superficial peroneal deep peroneal and tibial nerve distributions.         Imaging:   We ordered and independently reviewed new radiographs at this clinic visit. The results were discussed with the patient. Findings include:     AP and lateral pelvis and right hip radiographs ordered and reviewed August 21, 2018 show well-positioned implants with no evidence of complication.       Assessment and Plan:     78 year old female 1 month status post right hip hemiarthroplasty for subacute femoral neck fracture.  She has regained the ability to ambulate, albeit requiring assistance.  I think this is " a positive for her.  I will defer the management of her lower extremity edema to the medical team.  She is currently using compression stockings, diuretics and she had a negative duplex ultrasound.  I think she can stop the Lovenox in 2 weeks.  I recommended she continue to increase her ambulation as tolerated with up to observation of posterior hip precautions and fall precautions.  Follow-up in 3 months with AP pelvis and lateral right hip radiographs.           Respectfully,  Zaire Phan MD  Spine Surgery  [facility]

## 2018-08-23 LAB
FUNGUS SPEC CULT: NORMAL
SPECIMEN SOURCE: NORMAL

## 2018-08-31 ENCOUNTER — APPOINTMENT (OUTPATIENT)
Dept: LAB | Facility: CLINIC | Age: 78
End: 2018-08-31
Attending: INTERNAL MEDICINE
Payer: MEDICARE

## 2018-08-31 ENCOUNTER — OFFICE VISIT (OUTPATIENT)
Dept: RADIATION ONCOLOGY | Facility: CLINIC | Age: 78
End: 2018-08-31
Attending: RADIOLOGY
Payer: MEDICARE

## 2018-08-31 ENCOUNTER — ONCOLOGY VISIT (OUTPATIENT)
Dept: ONCOLOGY | Facility: CLINIC | Age: 78
End: 2018-08-31
Attending: INTERNAL MEDICINE
Payer: MEDICARE

## 2018-08-31 VITALS
WEIGHT: 122.4 LBS | DIASTOLIC BLOOD PRESSURE: 63 MMHG | HEIGHT: 66 IN | HEART RATE: 76 BPM | OXYGEN SATURATION: 96 % | SYSTOLIC BLOOD PRESSURE: 112 MMHG | TEMPERATURE: 97.7 F | BODY MASS INDEX: 19.67 KG/M2

## 2018-08-31 VITALS
DIASTOLIC BLOOD PRESSURE: 71 MMHG | BODY MASS INDEX: 19.78 KG/M2 | HEART RATE: 92 BPM | OXYGEN SATURATION: 96 % | SYSTOLIC BLOOD PRESSURE: 119 MMHG | WEIGHT: 122.5 LBS

## 2018-08-31 DIAGNOSIS — C21.1 MALIGNANT NEOPLASM OF ANAL CANAL (H): Primary | ICD-10-CM

## 2018-08-31 DIAGNOSIS — C21.1 MALIGNANT NEOPLASM OF ANAL CANAL (H): ICD-10-CM

## 2018-08-31 LAB
ALBUMIN SERPL-MCNC: 2.9 G/DL (ref 3.4–5)
ALP SERPL-CCNC: 125 U/L (ref 40–150)
ALT SERPL W P-5'-P-CCNC: 16 U/L (ref 0–50)
ANION GAP SERPL CALCULATED.3IONS-SCNC: 6 MMOL/L (ref 3–14)
AST SERPL W P-5'-P-CCNC: 14 U/L (ref 0–45)
BASOPHILS # BLD AUTO: 0 10E9/L (ref 0–0.2)
BASOPHILS NFR BLD AUTO: 0.4 %
BILIRUB SERPL-MCNC: 0.6 MG/DL (ref 0.2–1.3)
BUN SERPL-MCNC: 25 MG/DL (ref 7–30)
CALCIUM SERPL-MCNC: 8.3 MG/DL (ref 8.5–10.1)
CHLORIDE SERPL-SCNC: 106 MMOL/L (ref 94–109)
CO2 SERPL-SCNC: 28 MMOL/L (ref 20–32)
CREAT SERPL-MCNC: 0.46 MG/DL (ref 0.52–1.04)
DIFFERENTIAL METHOD BLD: ABNORMAL
EOSINOPHIL # BLD AUTO: 0.1 10E9/L (ref 0–0.7)
EOSINOPHIL NFR BLD AUTO: 1.8 %
ERYTHROCYTE [DISTWIDTH] IN BLOOD BY AUTOMATED COUNT: 15.9 % (ref 10–15)
GFR SERPL CREATININE-BSD FRML MDRD: >90 ML/MIN/1.7M2
GLUCOSE SERPL-MCNC: 86 MG/DL (ref 70–99)
HCT VFR BLD AUTO: 33.1 % (ref 35–47)
HGB BLD-MCNC: 10 G/DL (ref 11.7–15.7)
IMM GRANULOCYTES # BLD: 0 10E9/L (ref 0–0.4)
IMM GRANULOCYTES NFR BLD: 0.6 %
LYMPHOCYTES # BLD AUTO: 0.8 10E9/L (ref 0.8–5.3)
LYMPHOCYTES NFR BLD AUTO: 12.3 %
MCH RBC QN AUTO: 27.2 PG (ref 26.5–33)
MCHC RBC AUTO-ENTMCNC: 30.2 G/DL (ref 31.5–36.5)
MCV RBC AUTO: 90 FL (ref 78–100)
MONOCYTES # BLD AUTO: 0.8 10E9/L (ref 0–1.3)
MONOCYTES NFR BLD AUTO: 11.1 %
NEUTROPHILS # BLD AUTO: 5 10E9/L (ref 1.6–8.3)
NEUTROPHILS NFR BLD AUTO: 73.8 %
NRBC # BLD AUTO: 0 10*3/UL
NRBC BLD AUTO-RTO: 0 /100
PLATELET # BLD AUTO: 317 10E9/L (ref 150–450)
POTASSIUM SERPL-SCNC: 3.8 MMOL/L (ref 3.4–5.3)
PROT SERPL-MCNC: 6.7 G/DL (ref 6.8–8.8)
RBC # BLD AUTO: 3.68 10E12/L (ref 3.8–5.2)
SODIUM SERPL-SCNC: 140 MMOL/L (ref 133–144)
WBC # BLD AUTO: 6.8 10E9/L (ref 4–11)

## 2018-08-31 PROCEDURE — G0463 HOSPITAL OUTPT CLINIC VISIT: HCPCS | Mod: ZF

## 2018-08-31 PROCEDURE — 36591 DRAW BLOOD OFF VENOUS DEVICE: CPT

## 2018-08-31 PROCEDURE — 99213 OFFICE O/P EST LOW 20 MIN: CPT | Mod: ZP | Performed by: INTERNAL MEDICINE

## 2018-08-31 PROCEDURE — 25000128 H RX IP 250 OP 636: Mod: ZF | Performed by: INTERNAL MEDICINE

## 2018-08-31 PROCEDURE — 85025 COMPLETE CBC W/AUTO DIFF WBC: CPT | Performed by: INTERNAL MEDICINE

## 2018-08-31 PROCEDURE — G0463 HOSPITAL OUTPT CLINIC VISIT: HCPCS | Mod: 27 | Performed by: RADIOLOGY

## 2018-08-31 PROCEDURE — 80053 COMPREHEN METABOLIC PANEL: CPT | Performed by: INTERNAL MEDICINE

## 2018-08-31 RX ORDER — HEPARIN SODIUM (PORCINE) LOCK FLUSH IV SOLN 100 UNIT/ML 100 UNIT/ML
5 SOLUTION INTRAVENOUS EVERY 8 HOURS
Status: DISCONTINUED | OUTPATIENT
Start: 2018-08-31 | End: 2018-09-08 | Stop reason: HOSPADM

## 2018-08-31 RX ADMIN — SODIUM CHLORIDE, PRESERVATIVE FREE 5 ML: 5 INJECTION INTRAVENOUS at 11:16

## 2018-08-31 ASSESSMENT — PAIN SCALES - GENERAL
PAINLEVEL: EXTREME PAIN (8)
PAINLEVEL: NO PAIN (0)

## 2018-08-31 NOTE — PROGRESS NOTES
FOLLOW-UP VISIT    Patient Name: Elizabeth Taylor      : 1940     Age: 78 year old        ______________________________________________________________________________     Chief Complaint   Patient presents with     Cancer     Follow up for Anal 5400 cGy completed on 3/23/18     /71  Pulse 92  Wt 55.6 kg (122 lb 8 oz)  SpO2 96%  BMI 19.78 kg/m2     Date Radiation Completed: 3/23/18    Pain  Denies    Labs  Other Labs: No    Imaging  None          Other Appointments:     MD Name:  Appointment Date:    MD Name: Appointment Date:   MD Name: Appointment Date:   Other Appointment Notes:     Residual Radiation side effect: no side effects      Additional Instructions:     Nurse face-to-face time: Level 2:  5 min face to face time

## 2018-08-31 NOTE — MR AVS SNAPSHOT
After Visit Summary   8/31/2018    Elizabeth Taylor    MRN: [id number]           Patient Information     Date Of Birth          1940        Visit Information        Provider Department      8/31/2018 11:30 AM Shen Ray MD [facility]        Today's Diagnoses     Malignant neoplasm of anal canal (H)          Care Instructions    In          Follow-ups after your visit        Your next 10 appointments already scheduled     Sep 24, 2018  8:30 AM CDT   (Arrive by 8:15 AM)   Return Visit with Emir Rosas MD   [facility] ([facility])    [address]  4th Floor  [place] [facility] [address]   [phone]            Oct 22, 2018 11:30 AM CDT   Return Visit with GRAHAM Cook CNP   Radiation Oncology Clinic ([facility])    [facility]  1st Floor  [address] [address]   [phone]            Nov 26, 2018 11:00 AM CST   (Arrive by 10:45 AM)   Return Visit with Emir Rosas MD   [facility] ([facility])    [address]  4th [facility] [address]   [phone]            Nov 27, 2018  8:00 AM CST   [facility] Draw with  [facility] DRAW   [facility] Draw ([facility])    [address]  [address]  [facility] [facility] [address] [phone]            Nov 27, 2018  8:20 AM CST   (Arrive by 8:05 AM)   Return Visit with HAIDER Avina   [facility] ([facility])    [address]  [address]  [place] [facility] [address] [phone]            Nov 27, 2018 10:00 AM CST   (Arrive by 9:30 AM)   RETURN SPINE with Zaire Phan MD   [facility] ([facility])    [address] [place] [address]  [address] [facility] [address]    [phone]            Feb 22, 2019  9:00 AM CST   [facility] Draw with  [facility] DRAW   [facility] Draw ([facility])    [address]  [address]  [place] [facility] [id number] [phone]            Feb 22, 2019  9:40 AM CST   CT ABDOMEN PELVIS W CONTRAST with UCCT1   [facility] CT ([facility])    [address]  1st Floor  [facility] [id number] [phone]           How do I prepare for my exam? (Food and drink instructions) To prepare: Do not eat or drink for 2 hours before your exam. If you need to take medicine, you may take it with small sips of water. (We may ask you to take liquid medicine as well.)  How do I prepare for my exam? (Other instructions) Please arrive 30 minutes early for your CT.  Once in the department you might be asked to drink water 15-20 minutes prior to your exam.  If indicated you may be asked to drink an oral contrast in advance of your CT.  If this is the case, the imaging team will let you know or be in contact with you prior to your appointment  Patients over 70 or patients with diabetes or kidney problems: If you haven t had a blood test (creatinine test) within the last 30 days, the Cardiologist/Radiologist may require you to get this test prior to your exam.  If you have diabetes:  Continue to take your metformin medication on the day of your exam  What should I wear: Please wear loose clothing, such as a sweat suit or jogging clothes. Avoid snaps, zippers and other metal. We may ask you to undress and put on a hospital gown.  How long does the exam take: Most scans take less than 20 minutes.  What should I bring: Please bring any scans or X-rays taken at other hospitals, if similar tests were done. Also bring a list of your medicines, including vitamins, minerals and over-the-counter drugs. It is safest to leave personal items at home.  Do I need a : No  is needed.   What do I need to tell my doctor? Be sure to tell your doctor: * If you have any allergies. * If there s any chance you are pregnant. * If you are breastfeeding.  What should I do after the exam: No restrictions, You may resume normal activities.  What is this test: A CT (computed tomography) scan is a series of pictures that allows us to look inside your body. The scanner creates images of the body in cross sections, much like slices of bread. This helps us see any problems more clearly. You may receive contrast (X-ray dye) before or during your scan. You will be asked to drink the contrast.  Who should I call with questions: If you have any questions, please call the Imaging Department where you will have your exam. Directions, parking instructions, and other information is available on our website, [employer]/imaging.            Feb 22, 2019 12:30 PM CST   (Arrive by 12:15 PM)   Return Visit with Shen Ray MD   [facility] ([facility])    [address] [place] [address]  [address] [place] [address] [phone]              Who to contact     If you have questions or need follow up information about today's clinic visit or your schedule please contact [facility] directly at [phone].  Normal or non-critical lab and imaging results will be communicated to you by MyChart, letter or phone within 4 business days after the clinic has received the results. If you do not hear from us within 7 days, please contact the clinic through MyChart or phone. If you have a critical or abnormal lab result, we will notify you by phone as soon as possible.  Submit refill requests through [employer] or call your pharmacy and they will forward the refill request to us. Please allow 3 business days for your refill to be completed.          Additional Information About Your Visit        [employer] Information     [employer] gives you secure access to your electronic health  "record. If you see a primary care provider, you can also send messages to your care team and make appointments. If you have questions, please call your primary care clinic.  If you do not have a primary care provider, please call [phone] and they will assist you.        Care EveryWhere ID     This is your Care EveryWhere ID. This could be used by other organizations to access your [facility] medical records  [id number]        Your Vitals Were     Pulse Temperature Height Pulse Oximetry BMI (Body Mass Index)       76 97.7  F (36.5  C) (Oral) 1.676 m (5' 5.98\") 96% 19.77 kg/m2        Blood Pressure from Last 3 Encounters:   No data found for BP    Weight from Last 3 Encounters:   No data found for Wt              Today, you had the following     No orders found for display       Primary Care Provider Office Phone # Fax #    Baron Seth [phone]       [address]  [facility] [address]        Equal Access to Services     IONA DALEY AH: Hadii aad ku hadasho Soomaali, waaxda luqadaha, qaybta kaalmada adeegyada, waxay idiin hayaan adeeg kharashirley la'natalian . So [facility] [phone].    ATENCIÓN: Si habla español, tiene a ghosh disposición servicios gratuitos de asistencia lingüística. Ll[facility] [phone].    We comply with applicable federal civil rights laws and [place] laws. We do not discriminate on the basis of race, color, national origin, age, disability, sex, sexual orientation, or gender identity.            Thank you!     Thank you for choosing [facility]  for your care. Our goal is always to provide you with excellent care. Hearing back from our patients is one way we can continue to improve our services. Please take a few minutes to complete the written survey that you may receive in the mail after your visit with us. Thank you!             Your Updated Medication List - Protect others around you: Learn how to safely use, store and throw away your medicines at " www.disposemymeds.org.          This list is accurate as of 8/31/18 11:59 PM.  Always use your most recent med list.                   Brand Name Dispense Instructions for use Diagnosis    artificial saliva Liqd liquid      Swish and spit 5-10 mLs in mouth 4 times daily as needed for dry mouth    Mouth dryness       calcium carbonate 600 mg-vitamin D 400 units 600-400 MG-UNIT per tablet    CALTRATE    60 tablet    Take 1 tablet by mouth daily    Age-related osteoporosis with current pathological fracture, initial encounter       diclofenac 1.3 % Patch    FLECTOR     Place 1 patch onto the skin 2 times daily    Closed fracture of neck of right femur, initial encounter (H)       dimethicone 1.3 % Lotn lotion      Externally apply topically 2 times daily    Wound of buttock, unspecified laterality, subsequent encounter       menthol-zinc oxide 0.44-20.625 % Oint ointment    CALMOSEPTINE    1 Tube    Apply thick layer to irritated perianal skin 4 times per day and after bowel movements.    Malignant neoplasm of anal canal (H)       methocarbamol 500 MG tablet    ROBAXIN    120 tablet    Take 1 tablet (500 mg) by mouth 2 times daily as needed for muscle spasms    Closed fracture of one rib of right side, initial encounter       multivitamin, therapeutic Tabs tablet      Take 1 tablet by mouth daily    Age-related osteoporosis with current pathological fracture, initial encounter       oxyCODONE IR 5 MG tablet    ROXICODONE    20 tablet    Take 0.5-1 tablets (2.5-5 mg) by mouth every 3 hours as needed for moderate to severe pain    Closed displaced fracture of neck of right femur (H)       simethicone 40 MG/0.6ML suspension    MYLICON     Take 0.6 mLs (40 mg) by mouth every 6 hours as needed for cramping    Malignant neoplasm of anal canal (H), Flatulence, eructation and gas pain       vitamin B complex with vitamin C Tabs tablet      Take 1 tablet by mouth daily        zinc oxide 40 % ointment    DESITIN     Apply  topically 2 times daily    Wound of buttock, unspecified laterality, subsequent encounter

## 2018-08-31 NOTE — LETTER
8/31/2018       RE: Elizabeth Taylor  [address]  [facility] [address]     Dear Colleague,    Thank you for referring your patient, Elizabeth Taylor, to the [facility]. Please see a copy of my visit note below.      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 She underwent a colonoscopy in April 2017 which noted to have small anal fissure along with internal hemorrhoids. Symptoms continued and  she was referred to colorectal surgery for further evaluation. On exam she was found to have an 1.5 cm anal lesion on the left side along with left groin palpable adenopathy. Biopsy of anal mass came back positive for squamous cell carcinoma. Patient underwent staging workup with MRI pelvis and a PET scan- showed PET avid left anal canal mass along with small left inguinal FDG avid lymph nodes.     02/12/18- Started on concurrent chemoradiation with 5FU/Mitomycin    03/13/18 Skipped Mitomycin and proceeded with 5FU at reduced dose given neutropenia/old age    03/19/18 -04/04/18 admitted to the [facility] with intractable diarrhea, nausea, generalized weakness and fall at home resulting in multiple right rib fractures. Hospital course was complicated by small bowel obstruction for which patient was started on TPN. Bowel obstruction was managed conservatively eventually improved prior to discharge . 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 She was eventually discharged to transitional care unit.    05/25/18 05/25/18 MRI pelvis and  PET scan done showed near complete resolution of patient's known anal lesion with no evidence for recurrence or metastatic disease.      07/25/18-07/30/18 admitted to the hospital with traumatic right femoral neck fracture secondary to fall. She underwent right hemiarthroplasty and was discharged to transitional care facility. Also noted to have a C. difficile infection and was started on oral vancomycin course.    SUBJECTIVE      She is in clinic today for follow-up. She is accompanied by her friend. Continues to be at the transitional care facility and undergoing rehabilitation with physical and occupational therapy. States that her energy levels and appetite are slowly improving. Diarrhea has resolved. Denies abdominal pain, blood in the stools, fever/chills, nausea/vomiting or any other complaints      PAST MEDICAL HISTORY     Past Medical History:   Diagnosis Date     Anal cancer (H)      Endometriosis, site unspecified      Thyroiditis, unspecified     Thryoiditis-resolved         CURRENT OUTPATIENT MEDICATIONS     Current Outpatient Prescriptions   Medication Sig Dispense Refill     artificial saliva (BIOTENE DRY MOUTHWASH) LIQD liquid Swish and spit 5-10 mLs in mouth 4 times daily as needed for dry mouth       calcium-vitamin D (CALTRATE) 600-400 MG-UNIT per tablet Take 1 tablet by mouth daily 60 tablet      diclofenac (FLECTOR) 1.3 % Patch Place 1 patch onto the skin 2 times daily       dimethicone 1.3 % LOTN lotion Externally apply topically 2 times daily       menthol-zinc oxide (CALMOSEPTINE) 0.44-20.625 % OINT ointment Apply thick layer to irritated perianal skin 4 times per day and after bowel movements. 1 Tube 3     multivitamin, therapeutic (THERA-VIT) TABS tablet Take 1 tablet by mouth daily  0     oxyCODONE IR (ROXICODONE) 5 MG tablet Take 0.5-1 tablets (2.5-5 mg) by mouth every 3 hours as needed for moderate to severe pain 20 tablet 0     simethicone (MYLICON) 40 MG/0.6ML suspension Take 0.6 mLs  (40 mg) by mouth every 6 hours as needed for cramping       vitamin B complex with vitamin C (VITAMIN  B COMPLEX) TABS tablet Take 1 tablet by mouth daily       zinc oxide (DESITIN) 40 % ointment Apply topically 2 times daily       methocarbamol (ROBAXIN) 500 MG tablet Take 1 tablet (500 mg) by mouth 2 times daily as needed for muscle spasms (Patient not taking: Reported on 8/31/2018) 120 tablet 0        ALLERGIES     Allergies   Allergen Reactions     Darvon [Propoxyphene]      Had reaction in teen years     Penicillins      As a child     Ketoconazole Rash        REVIEW OF SYSTEMS   As above in the HPI, o/w complete 12-point ROS was negative.     PHYSICAL EXAM   B/P: 131/82, T: 98.5, P: 78, R: 16  SpO2 Readings from Last 4 Encounters:   08/31/18 96%   08/31/18 96%   07/30/18 97%   07/05/18 96%     Wt Readings from Last 3 Encounters:   08/31/18 55.6 kg (122 lb 8 oz)   08/31/18 55.5 kg (122 lb 6.4 oz)   08/21/18 51.7 kg (114 lb)     GEN: alert. No distress  Mouth/ENT: Oropharynx is clear.   LUNGS: clear  CV: regular  ABDOMEN: soft, non-tender, non-distended, no palpable inguinal adenopathy  EXT: warm, well perfused  NEURO: alert     LABORATORY AND IMAGING STUDIES     Lab Results   Component Value Date    WBC 6.8 08/31/2018     Lab Results   Component Value Date    RBC 3.68 08/31/2018     Lab Results   Component Value Date    HGB 10.0 08/31/2018     Lab Results   Component Value Date    HCT 33.1 08/31/2018     No components found for: MCT  Lab Results   Component Value Date    MCV 90 08/31/2018     Lab Results   Component Value Date    MCH 27.2 08/31/2018     Lab Results   Component Value Date    MCHC 30.2 08/31/2018     Lab Results   Component Value Date    RDW 15.9 08/31/2018     Lab Results   Component Value Date     08/31/2018     Recent Labs   Lab Test  08/31/18   1121  07/29/18   0650   07/27/18   0759   NA  140   --    --   137   POTASSIUM  3.8  4.4   < >  3.9   CHLORIDE  106   --    --   102   CO2  28    --    --   28   ANIONGAP  6   --    --   6   GLC  86   --    --   91   BUN  25   --    --   10   CR  0.46*   --    --   0.53   ELISE  8.3*   --    --   8.6    < > = values in this interval not displayed.        ASSESSMENT AND PLAN     77-year-old female with no significant past medical history who presented with complaints of bright red blood per rectum and was recently found to have an anal mass which on biopsy came back for positive for invasive squamous cell carcinoma. Staging workup showed a PET avid anal mass along with hypermetabolic left inguinal adenopathy.     - Squamous cell carcinoma Anal canal  T2N1- IIIA  Started on definitive chemoradiation with infusional FU 1000 mg/m2 on days 1 to 4 and 29 to 32, plus mitomycin 10 mg/m2 on days 1 and 29 (as tolerated), maximum 20 mg per dose- day 1 on 02/12/18  Day 29 03/13/18 Skipped mitomycin and proceeded with 5FU at reduced dose given neutropenia/old age.  05/25/18 MRI pelvis and PET scan done showed near complete resolution of patient's known anal lesion with no evidence for recurrence or metastatic disease.    Currently on surveillance with distal rectal exam with inguinal lymph node palpation every 3-6 months for 5 years along with Anoscopy every 6-12 months for 3 years.   Patient will also require a annual CT chest abdomen and pelvis with contrast for 3 years.   Missed surgery appointment due to recent hospital admission. Will ask schedulers to help arrange that.    - Recent fall/Fracture s/p repair  Currently at [facility] and undergoing rehabilitation with PT/OT.    - Normocytic anemia  Likely secondary to recent surgery/illness. Would suggest oral iron supplementation    RTC in 12 weeks with JOVANI for office visit, labs.   RTC in 6 month with me for ov, labs, Scans prior   Chart documentation with Dragon Voice recognition Software. Although reviewed after completion, some words and grammatical errors may remain.  Shen Ray MD  Attending Physician    Hematology/Medical Oncology

## 2018-08-31 NOTE — NURSING NOTE
"Oncology Rooming Note    August 31, 2018 11:35 AM   Elizabeth Taylor is a 78 year old female who presents for:    Chief Complaint   Patient presents with     Lab Only     labs drawn via port, saline and heparin locked, vitals completed     RECHECK     ONC ANAl Ca      Initial Vitals: /63  Pulse 76  Temp 97.7  F (36.5  C) (Oral)  Ht 1.676 m (5' 5.98\")  Wt 55.5 kg (122 lb 6.4 oz)  SpO2 96%  BMI 19.77 kg/m2 Estimated body mass index is 19.77 kg/(m^2) as calculated from the following:    Height as of this encounter: 1.676 m (5' 5.98\").    Weight as of this encounter: 55.5 kg (122 lb 6.4 oz). Body surface area is 1.61 meters squared.  Extreme Pain (8) Comment: she takes pain pills and a patch for pain   No LMP recorded. Patient is postmenopausal.  Allergies reviewed: Yes  Medications reviewed: Yes    Medications: Medication refills not needed today.  Pharmacy name entered into [facility]: [facility] - [place], [place] - [id number]    Clinical concerns: none      6 minutes for nursing intake (face to face time)     Марина MARIOLA Schofield              "

## 2018-08-31 NOTE — MR AVS SNAPSHOT
After Visit Summary   8/31/2018    Elizabeth Taylor    MRN: [id number]           Patient Information     Date Of Birth          1940        Visit Information        Provider Department      8/31/2018 1:00 PM Zaire Madison MD Rad[facility]        Today's Diagnoses     Malignant neoplasm of anal canal (H)    -  1       Follow-ups after your visit        Your next 10 appointments already scheduled     Sep 24, 2018  8:30 AM CDT   (Arrive by 8:15 AM)   Return Visit with Emir Rosas MD   [facility] ([facility])    [address]  4th Floor  [facility] [id number] [phone]            Oct 22, 2018 11:30 AM CDT   Return Visit with GRAHAM Cook CNP   Rad[facility] ([facility])    [facility]  1st Floor  [address] [id number] [phone]              Future tests that were ordered for you today     Open Future Orders        Priority Expected Expires Ordered    CBC with platelets differential Routine  8/31/2019 8/31/2018    Comprehensive metabolic panel Routine  8/31/2019 8/31/2018    CT Abdomen Pelvis w Contrast Routine 2/28/2019 8/31/2019 8/31/2018            Who to contact     Please call your clinic at [phone] to:    Ask questions about your health    Make or cancel appointments    Discuss your medicines    Learn about your test results    Speak to your doctor            Additional Information About Your Visit        [employer] Information     [employer] gives you secure access to your electronic health record. If you see a primary care provider, you can also send messages to your care team and make appointments. If you have questions, please call your primary care clinic.  If you do not have a primary care provider, please call [phone] and they will assist you.      [employer] is an electronic gateway that provides  easy, online access to your medical records. With [employer], you can request a clinic appointment, read your test results, renew a prescription or communicate with your care team.     To access your existing account, please contact your [facility] or call [phone] for assistance.        Care EveryWhere ID     This is your Care EveryWhere ID. This could be used by other organizations to access your [place] medical records  [id number]        Your Vitals Were     Pulse Pulse Oximetry BMI (Body Mass Index)             92 96% 19.78 kg/m2          Blood Pressure from Last 3 Encounters:   08/31/18 119/71   08/31/18 112/63   07/30/18 121/67    Weight from Last 3 Encounters:   08/31/18 55.6 kg (122 lb 8 oz)   08/31/18 55.5 kg (122 lb 6.4 oz)   08/21/18 51.7 kg (114 lb)              Today, you had the following     No orders found for display       Primary Care Provider Office Phone # Fax #    Baron Seth [phone]       [address]  [facility] [address]        Equal Access to Services     IONA DALEY : Hadii chelo ku hadfabio Somushtaq, waaxda sonu, qaybta kavon gomes, cyndy walton . So [facility] [phone].    ATENCIÓN: Si habla español, tiene a ghosh disposición servicios gratuitos de asistencia lingüística. [facility] [phone].    We comply with applicable federal civil rights laws and [place] laws. We do not discriminate on the basis of race, color, national origin, age, disability, sex, sexual orientation, or gender identity.            Thank you!     Thank you for choosing [facility]  for your care. Our goal is always to provide you with excellent care. Hearing back from our patients is one way we can continue to improve our services. Please take a few minutes to complete the written survey that you may receive in the mail after your visit with us. Thank you!             Your Updated Medication List - Protect  others around you: Learn how to safely use, store and throw away your medicines at www.disposemymeds.org.          This list is accurate as of 8/31/18  2:16 PM.  Always use your most recent med list.                   Brand Name Dispense Instructions for use Diagnosis    artificial saliva Liqd liquid      Swish and spit 5-10 mLs in mouth 4 times daily as needed for dry mouth    Mouth dryness       calcium carbonate 600 mg-vitamin D 400 units 600-400 MG-UNIT per tablet    CALTRATE    60 tablet    Take 1 tablet by mouth daily    Age-related osteoporosis with current pathological fracture, initial encounter       diclofenac 1.3 % Patch    FLECTOR     Place 1 patch onto the skin 2 times daily    Closed fracture of neck of right femur, initial encounter (H)       dimethicone 1.3 % Lotn lotion      Externally apply topically 2 times daily    Wound of buttock, unspecified laterality, subsequent encounter       menthol-zinc oxide 0.44-20.625 % Oint ointment    CALMOSEPTINE    1 Tube    Apply thick layer to irritated perianal skin 4 times per day and after bowel movements.    Malignant neoplasm of anal canal (H)       methocarbamol 500 MG tablet    ROBAXIN    120 tablet    Take 1 tablet (500 mg) by mouth 2 times daily as needed for muscle spasms    Closed fracture of one rib of right side, initial encounter       multivitamin, therapeutic Tabs tablet      Take 1 tablet by mouth daily    Age-related osteoporosis with current pathological fracture, initial encounter       oxyCODONE IR 5 MG tablet    ROXICODONE    20 tablet    Take 0.5-1 tablets (2.5-5 mg) by mouth every 3 hours as needed for moderate to severe pain    Closed displaced fracture of neck of right femur (H)       simethicone 40 MG/0.6ML suspension    MYLICON     Take 0.6 mLs (40 mg) by mouth every 6 hours as needed for cramping    Malignant neoplasm of anal canal (H), Flatulence, eructation and gas pain       vitamin B complex with vitamin C Tabs tablet      Take  1 tablet by mouth daily        zinc oxide 40 % ointment    DESITIN     Apply topically 2 times daily    Wound of buttock, unspecified laterality, subsequent encounter

## 2018-08-31 NOTE — PROGRESS NOTES
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 Completed 3/23/2018.    SUBJECTIVE:   Elizabeth Taylor is a 78 year old female who was diagnosed with a locally advanced squamous cell carcinoma of the anal canal after presenting with progressive anal pain and small amounts of bright red blood per rectum. Staging evaluation revealed a 2.5 cm mass located approximately 1 cm from the anal verge with involvement of the internal anal sphincter in addition to 2 FDG-avid left inguinal nodes.    Ms. Taylor underwent treatment with definitive chemoradiotherapy with curative intent. The primary tumor was treated to a total dose of 54 Gy while the node-positive left groin received 50.4 Gy and the right groin and pelvis was treated to 45 Gy in 30 daily fractions using a simultaneous integrated boost technique. Radiotherapy was delivered with concurrent Mitomycin-C and infusional 5-FU with Mitomycin held with her second cycle of therapy due to concern for treatment-related toxicities given her age and borderline functional status. Ms. Taylor tolerated the initiation of therapy relatively well although approximately midway through her treatment course developing worsening diarrhea, eventually requiring inpatient admission during her final week of radiation.     Since her prior follow-up visit, Ms. Taylor presented with right hip and leg swelling on 2018 after having suffered a  mechanical fall approximately 2 weeks prior. Workup revealed a right femoral neck fracture and she underwent a right hip hemiarthroplasty on 7/26/2018. Her follow-up with Dr. Rosas of colorectal surgery scheduled for 7/30/2018 was canceled while she was inpatient for surgery and this appointment has not yet been rescheduled.    On interview today Ms. Taylor reports that she is recovering from an acute C. diff infection. Her bowel function has dramatically improved over the last several days and she currently reports that she is having 3 nonpainful, nonbloody bowel movements per day. She continues to have some degree of bowel urgency and abdominal distention and pain when she eats large volumes of food as well as stable baseline urinary urgency which was present prior to initiation of radiotherapy. She otherwise has no reported fever/chills, nausea/vomiting, dyspnea or chest pain and her remaining ROS unremarkable.    PHYSICAL EXAM:  /71  Pulse 92  Wt 55.6 kg (122 lb 8 oz)  SpO2 96%  BMI 19.78 kg/m2  Gen: Mildly fatigued-appearing but in no acute distress.  Eyes: PERRL, EOMI  Pulm: No wheezing, stridor or respiratory distress  CV: Well-perfused, no cyanosis, mild bilateral pedal edema  Rectal: External rectal exam showed well healed skin. Digital rectal exam showed no evidence of active residual disease however there continues to be a firm area of nodularity within the left posterolateral aspect of the superficial portion of the anal canal. A full BRITTNEY to examine the remainder of the anal canal was not tolerated by the patient secondary to pain.  Musculoskeletal: Diffusely diminished muscle bulk with normal tone. No palpable inguinal adenopathy bilaterally.  Skin: Stable residual hyperpigmentation surrounding the perianal region as well as mottled hyper and hypopigmentation within the bilateral groin (left > right). No areas of residual desquamation or ulceration.    LABS AND  IMAGING:  Reviewed.    IMPRESSION:   Ms. Taylor is a 78 year old female with a previous cT2 N1a M0 squamous cell carcinoma of the anal canal status post definitive chemoradiotherapy. She continues to make a slow recovery from her prior treatment-related toxicities and associated post-treatment medical issues (right femoral neck fracture and recent C diff infection). There is no clinical evidence, albeit on limited exam, concerning for progressive/recurrent disease.      PLAN:   1. Follow-up in radiation oncology clinic with NP in 2 months  2. Continued follow-up with medical oncology as scheduled  3. Reschedule follow-up with Dr. Rosas of colorectal surgery for ongoing disease surveillance  4. Once again discussed routine vaginal dilator use to prevent stenosis of the vaginal introitus  5. Continue daily moisturizer use and periodic sitz baths for perineal skin cares    Josafat Medrano MD  Radiation Oncology Resident, PGY-4  [facility]  Phone: [phone]      Attending addendum:   I saw and examined the patient with the resident and agree with the documented plan of care.    Zaire Madison MD/PhD  Dept of Radiation Oncology  [facility]

## 2018-09-05 ENCOUNTER — TELEPHONE (OUTPATIENT)
Dept: SURGERY | Facility: CLINIC | Age: 78
End: 2018-09-05

## 2018-09-05 ENCOUNTER — TELEPHONE (OUTPATIENT)
Dept: ORTHOPEDICS | Facility: CLINIC | Age: 78
End: 2018-09-05

## 2018-09-05 NOTE — TELEPHONE ENCOUNTER
[facility] Call Center    Phone Message    May a detailed message be left on voicemail: yes    Reason for Call: Other: Pt need a 3 month FU and wants to know when she can start moving normally.      Action Taken: Message routed to:  [facility] ([facility]): Orthopedics

## 2018-09-05 NOTE — TELEPHONE ENCOUNTER
Message left for Katherine to make an appt.  With Dr. Phan mid November.  She should maintain restrictions until seen in November.

## 2018-09-05 NOTE — TELEPHONE ENCOUNTER
[facility] Call Center    Phone Message    May a detailed message be left on voicemail: yes    Reason for Call: Other: Pts friend, Katherine, called to see if there was a sooner appt with Dr. Rosas thatn 9/24/18.  Per Dr. Ray and pts radiation oncologist, they would like pt to see Dr. Rosas sooner than 9/24/18.  Katherine also needs to know if pt needs to be scheduled every 3 months for a follow up.  Please follow up with Katherine.  Thank you!     Action Taken: Other: [facility]

## 2018-09-05 NOTE — PROGRESS NOTES
FOLLOW-UP VISIT NOTE    PATIENT NAME: Elizabeth Taylor MRN # [id number]  DATE OF VISIT: Aug 31, 2018 YOB: 1940    REFERRING PROVIDER: Emir Rosas MD  [address] [place] [id number]    CANCER TYPE: Squamous cell carcinoma Anal canal  STAGE: T2N1- IIIA  ECOG PS: 1    ONCOLOGY HISTORY:  77-year-old female who developed bright red blood per rectum started about 4 years ago and progressively got worse. She underwent a colonoscopy in April 2017 which noted to have small anal fissure along with internal hemorrhoids. Symptoms continued and  she was referred to colorectal surgery for further evaluation. On exam she was found to have an 1.5 cm anal lesion on the left side along with left groin palpable adenopathy. Biopsy of anal mass came back positive for squamous cell carcinoma. Patient underwent staging workup with MRI pelvis and a PET scan- showed PET avid left anal canal mass along with small left inguinal FDG avid lymph nodes.     02/12/18- Started on concurrent chemoradiation with 5FU/Mitomycin    03/13/18 Skipped Mitomycin and proceeded with 5FU at reduced dose given neutropenia/old age    03/19/18 -04/04/18 admitted to the [facility] with intractable diarrhea, nausea, generalized weakness and fall at home resulting in multiple right rib fractures. Hospital course was complicated by small bowel obstruction for which patient was started on TPN. Bowel obstruction was managed conservatively eventually improved prior to discharge . 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 She was eventually discharged to transitional care unit.    05/25/18 05/25/18 MRI pelvis and PET scan done showed near complete resolution of patient's known anal lesion with no evidence for recurrence or metastatic disease.      07/25/18-07/30/18 admitted to the hospital with traumatic right femoral neck fracture secondary to fall. She  underwent right hemiarthroplasty and was discharged to transitional care facility. Also noted to have a C. difficile infection and was started on oral vancomycin course.    SUBJECTIVE      She is in clinic today for follow-up. She is accompanied by her friend. Continues to be at the transitional care facility and undergoing rehabilitation with physical and occupational therapy. States that her energy levels and appetite are slowly improving. Diarrhea has resolved. Denies abdominal pain, blood in the stools, fever/chills, nausea/vomiting or any other complaints      PAST MEDICAL HISTORY     Past Medical History:   Diagnosis Date     Anal cancer (H)      Endometriosis, site unspecified      Thyroiditis, unspecified     Thryoiditis-resolved         CURRENT OUTPATIENT MEDICATIONS     Current Outpatient Prescriptions   Medication Sig Dispense Refill     artificial saliva (BIOTENE DRY MOUTHWASH) LIQD liquid Swish and spit 5-10 mLs in mouth 4 times daily as needed for dry mouth       calcium-vitamin D (CALTRATE) 600-400 MG-UNIT per tablet Take 1 tablet by mouth daily 60 tablet      diclofenac (FLECTOR) 1.3 % Patch Place 1 patch onto the skin 2 times daily       dimethicone 1.3 % LOTN lotion Externally apply topically 2 times daily       menthol-zinc oxide (CALMOSEPTINE) 0.44-20.625 % OINT ointment Apply thick layer to irritated perianal skin 4 times per day and after bowel movements. 1 Tube 3     multivitamin, therapeutic (THERA-VIT) TABS tablet Take 1 tablet by mouth daily  0     oxyCODONE IR (ROXICODONE) 5 MG tablet Take 0.5-1 tablets (2.5-5 mg) by mouth every 3 hours as needed for moderate to severe pain 20 tablet 0     simethicone (MYLICON) 40 MG/0.6ML suspension Take 0.6 mLs (40 mg) by mouth every 6 hours as needed for cramping       vitamin B complex with vitamin C (VITAMIN  B COMPLEX) TABS tablet Take 1 tablet by mouth daily       zinc oxide (DESITIN) 40 % ointment Apply topically 2 times daily       methocarbamol  (ROBAXIN) 500 MG tablet Take 1 tablet (500 mg) by mouth 2 times daily as needed for muscle spasms (Patient not taking: Reported on 8/31/2018) 120 tablet 0        ALLERGIES     Allergies   Allergen Reactions     Darvon [Propoxyphene]      Had reaction in teen years     Penicillins      As a child     Ketoconazole Rash        REVIEW OF SYSTEMS   As above in the HPI, o/w complete 12-point ROS was negative.     PHYSICAL EXAM   B/P: 131/82, T: 98.5, P: 78, R: 16  SpO2 Readings from Last 4 Encounters:   08/31/18 96%   08/31/18 96%   07/30/18 97%   07/05/18 96%     Wt Readings from Last 3 Encounters:   08/31/18 55.6 kg (122 lb 8 oz)   08/31/18 55.5 kg (122 lb 6.4 oz)   08/21/18 51.7 kg (114 lb)     GEN: alert. No distress  Mouth/ENT: Oropharynx is clear.   LUNGS: clear  CV: regular  ABDOMEN: soft, non-tender, non-distended, no palpable inguinal adenopathy  EXT: warm, well perfused  NEURO: alert     LABORATORY AND IMAGING STUDIES     Lab Results   Component Value Date    WBC 6.8 08/31/2018     Lab Results   Component Value Date    RBC 3.68 08/31/2018     Lab Results   Component Value Date    HGB 10.0 08/31/2018     Lab Results   Component Value Date    HCT 33.1 08/31/2018     No components found for: MCT  Lab Results   Component Value Date    MCV 90 08/31/2018     Lab Results   Component Value Date    MCH 27.2 08/31/2018     Lab Results   Component Value Date    MCHC 30.2 08/31/2018     Lab Results   Component Value Date    RDW 15.9 08/31/2018     Lab Results   Component Value Date     08/31/2018     Recent Labs   Lab Test  08/31/18   1121  07/29/18   0650   07/27/18   0759   NA  140   --    --   137   POTASSIUM  3.8  4.4   < >  3.9   CHLORIDE  106   --    --   102   CO2  28   --    --   28   ANIONGAP  6   --    --   6   GLC  86   --    --   91   BUN  25   --    --   10   CR  0.46*   --    --   0.53   ELISE  8.3*   --    --   8.6    < > = values in this interval not displayed.        ASSESSMENT AND PLAN     77-year-old  female with no significant past medical history who presented with complaints of bright red blood per rectum and was recently found to have an anal mass which on biopsy came back for positive for invasive squamous cell carcinoma. Staging workup showed a PET avid anal mass along with hypermetabolic left inguinal adenopathy.     - Squamous cell carcinoma Anal canal  T2N1- IIIA  Started on definitive chemoradiation with infusional FU 1000 mg/m2 on days 1 to 4 and 29 to 32, plus mitomycin 10 mg/m2 on days 1 and 29 (as tolerated), maximum 20 mg per dose- day 1 on 02/12/18  Day 29 03/13/18 Skipped mitomycin and proceeded with 5FU at reduced dose given neutropenia/old age.  05/25/18 MRI pelvis and PET scan done showed near complete resolution of patient's known anal lesion with no evidence for recurrence or metastatic disease.    Currently on surveillance with distal rectal exam with inguinal lymph node palpation every 3-6 months for 5 years along with Anoscopy every 6-12 months for 3 years.   Patient will also require a annual CT chest abdomen and pelvis with contrast for 3 years.   Missed surgery appointment due to recent hospital admission. Will ask schedulers to help arrange that.    - Recent fall/Fracture s/p repair  Currently at [facility] and undergoing rehabilitation with PT/OT.    - Normocytic anemia  Likely secondary to recent surgery/illness. Would suggest oral iron supplementation    RTC in 12 weeks with JOVANI for office visit, labs.   RTC in 6 month with me for ov, labs, Scans prior   Chart documentation with Dragon Voice recognition Software. Although reviewed after completion, some words and grammatical errors may remain.  Shen Ray MD  Attending Physician   Hematology/Medical Oncology

## 2018-09-06 ENCOUNTER — CARE COORDINATION (OUTPATIENT)
Dept: ONCOLOGY | Facility: CLINIC | Age: 78
End: 2018-09-06

## 2018-09-06 ENCOUNTER — TELEPHONE (OUTPATIENT)
Dept: ORTHOPEDICS | Facility: CLINIC | Age: 78
End: 2018-09-06

## 2018-09-06 DIAGNOSIS — Z96.649 HISTORY OF TOTAL HIP ARTHROPLASTY: Primary | ICD-10-CM

## 2018-09-06 RX ORDER — CLINDAMYCIN HCL 300 MG
600 CAPSULE ORAL ONCE
Qty: 2 CAPSULE | Refills: 3 | Status: SHIPPED | OUTPATIENT
Start: 2018-09-06 | End: 2019-02-07

## 2018-09-06 NOTE — TELEPHONE ENCOUNTER
Pt was phoned regarding need for dental work following right total hip replacement done 7/26/18.  Pt's friend Katherine states Elizabeth has recently had a bout of C-diff, but has normal stools at this time, yet understands need for prophylactic antibiotics due to recent JENIFFER.  Elizabeth has penicillin allergy, Rx for clindamycin was entered.  Pt's dental clinic was phoned, [phone], and abx order was faxed to [phone].  Vangie Santacruz RN

## 2018-09-06 NOTE — PROGRESS NOTES
Spoke with Heather today.  RN stated would send request to nurse pool in gastroenterology reviewing pt last saw Dr. Rosas 5/30 and that he had wanted to see pt in 2 months but pt was in hospital for R femoral head fracture so missed appt.  Did not promise could get appt moved up from 9/24 but that would ask.  Message sent.

## 2018-09-06 NOTE — TELEPHONE ENCOUNTER
Called and spoke with Katherine.  Patient is already scheduled for 9/24/18.  Katherine states they would like a sooner appointment.  Informed Katherine that first available is October, and that I cannot offer sooner than their scheduled date.  Katherine verbalized understanding and will keep scheduled appointment.

## 2018-09-06 NOTE — TELEPHONE ENCOUNTER
[facility] Call Center    Phone Message    May a detailed message be left on voicemail: yes    Reason for Call: Other: Pts friend Dr. Katherine Canales, would like to know if this Pt needs antibiotics before seeing her dentist for a crown/Possible extraction.   Pt is allergic to pennecillin.  Katherine would like to know how long she would take this medication before hand and requests a call back to both her and the dental clinic Dr. Sulma Hollins of [facility] PH:[phone] . She stated that a Fax to Dr. Hollins is okay too.    Action Taken: Message routed to:  [facility] ([facility]): Ortho    CORRECTION:  The dental clinic number is [phone].

## 2018-09-06 NOTE — TELEPHONE ENCOUNTER
"[facility] Call Center    Phone Message    May a detailed message be left on voicemail: yes    Reason for Call: Other: Patient's friend Katherine (a dentist), called.  She is very concerned about getting Elizabeth in sooner as its been much longer than 3 months since she's been seen.  Patient stated to Katherine that \"things aren't normal down there\".  Please call asap to work patient in sooner.      Action Taken: Message routed to:  [facility] ([facility]): nacho    "

## 2018-09-12 ENCOUNTER — TELEPHONE (OUTPATIENT)
Dept: SURGERY | Facility: CLINIC | Age: 78
End: 2018-09-12

## 2018-09-12 NOTE — TELEPHONE ENCOUNTER
"----- Message from Katherine Coppola RN sent at 9/10/2018 11:50 AM CDT -----  Regarding: FW: [facility] rectal team--Can pt be seen earlier?      ----- Message -----     From: Pauline Aguayo RN     Sent: 9/10/2018   9:26 AM       To: [facility]  Subject: [facility] rectal team--Can pt be seen earlier?        Hi,    Above mutual pt last saw Dr. Rosas on 5/30/18.  At that time, per progress note, he wrote he wanted \"RTC in 2 months for repeat anorectal exam and anoscopy.\"  Pt missed July appt because she was hospitalized (observation?) and then transferred to TCU, bed bound, for \"traumatic right femoral neck fracture\".  She was in great deal of pain during hospital stay.  Unfortunately her appt in your office was not rescheduled at time of discharge.  I see in chart that you have already tried to move up pt's return appt from current 9/24 date but thought I would (gently) ask again.  If possible to see earlier, please contact Heather at [phone].    Thank you for any assistance you can provide!    Pauline"

## 2018-09-12 NOTE — TELEPHONE ENCOUNTER
Heather was called back in regard to the below message. Heather was informed the 9/24 appointment is the soonest available we have but if we have a cancellation for 9/17 we can call her and let her know to come in for that appointment. Heather stated understanding of this plan and is in agreement with these instructions. Heather will receive a callback on Friday if there is an opening for Monday.

## 2018-09-13 ENCOUNTER — TELEPHONE (OUTPATIENT)
Dept: FAMILY MEDICINE | Facility: CLINIC | Age: 78
End: 2018-09-13

## 2018-09-13 NOTE — TELEPHONE ENCOUNTER
Reason for Call:  Other call back    Detailed comments: Bianca is calling from [facility] to see if PCP will follow pt through home care & requesting for HMP faxed to them at [phone]. Thanks!    Bianca ph. [phone]    Phone Number Patient can be reached at: Home number on file [phone] (home)    Best Time: anytime    Can we leave a detailed message on this number? NO    Call taken on 9/13/2018 at 10:31 AM by Jodi Guerrero

## 2018-09-13 NOTE — TELEPHONE ENCOUNTER
Just regular homecare? Yes I can do that but she would need an office visit within the appropriate time frame. I think within the 30 days of homecare starting.     Baron Seth

## 2018-09-14 ENCOUNTER — CARE COORDINATION (OUTPATIENT)
Dept: SURGERY | Facility: CLINIC | Age: 78
End: 2018-09-14

## 2018-09-14 NOTE — PROGRESS NOTES
Heather was called back stating we had a patient cancel for 11:30 on Monday with Dr. Rosas. Heather states Elizabeth will be there at 11:30 to have her clinic visit moved up a week.

## 2018-09-16 NOTE — PROGRESS NOTES
"Colon and Rectal Surgery Follow-up Clinic Note      RE: Elizabeth Taylor  : 1940  [place]: 2018     DIAGNOSIS: mT2N1 anal canal squamous cell carcinoma (s/p CXRT [5,400 cGy], completed on 3/23/18).    INTERVAL HISTORY: Discharged from TCU. Slowly recovering after multiple falls/fractures and C diff diarrhea. Urinating spontaneously. Denies increased pain, fevers, or chills. Tolerating diet well. Having 2-3 BM's per day with improving fecal urgency but no incontinence. No BPR.      Physical Examination:  /66 (BP Location: Left arm, Patient Position: Chair, Cuff Size: Adult Regular)  Pulse 83  Temp 98.1  F (36.7  C) (Oral)  Ht 5' 5.98\"  Wt 120 lb 3.2 oz  SpO2 96%  BMI 19.41 kg/m2  Abdomen soft, NT. No inguinal adenopathy palpated.  Perianal skin with no more excoriation. Mild perianal hyperpigmentation and induration. No evidence of active infection.  BRITTNEY: no lesions palpated.  Anoscopy: Was performed.   Hemorrhoids: No significant internal hemorrhoids.  Lesions: No. White scar visualized at left anterior aspect of anal canal.    ASSESSMENT  Positive response to CXRT.     CEA: 2.0.    Interval imaging with PET-CT and MR pelvis on 18:    IMPRESSION:   In this patient with a history of anal cancer status post chemotherapy  and radiation:  1. Decrease in FDG uptake at the site of the primary anal mass. The  mass is poorly defined on the accompanying CT. This is consistent with  response to therapy.  2. No significant residual increased FDG uptake in the left inguinal  nodes.  3. Small focus of uptake adjacent to the urethra without an  accompanying lesion on the CT. This is most suspicious for a urethral  contamination, possibly within a urethral diverticulum or a small  amount of retained excreted urine tracking up into the vaginal fornix.  Recommend attention on follow-up imaging, although alternatively a  repeat MRI of the pelvis may help increase confidence that this  finding does not " represent small lesion.  4. Stable sub-5 mm indeterminate pulmonary nodules.  5. Improvement in trace amount of infiltrative free fluid in the  pelvis.  6. Decrease in now small right greater than left pleural effusions,  with resolution of the previously visualized right pneumothorax.  7. Incidental findings include chronic healing rib fractures,  asymmetric right paraspinal muscular uptake, cholelithiasis, and  dilated lower extremity superficial veins.  8. Redemonstration of the low-density cyst in the left adnexa likely  an ovarian cyst, without increased FDG uptake. Continued attention on  follow-up imaging is recommended.    IMPRESSION:   1. Interval near resolution of the patient's known left anal mass  lesion.  2. No evidence for recurrent or metastatic disease in the pelvis.  2a. Please also refer to same day PET/CT exam report.  3. Stable left ovarian cysts. Recommend monitoring at least on annual  basis (on routine anal cancer restaging follow-up or ultrasound pelvis  if at a later date restaging is no longer required).    PLAN  1. RTC in 6 months for MR and repeat anorectal exam and anoscopy.  2. Rest of surveillance per medical oncology.    Time spent: 30 minutes.  >50% spent in discussing, counseling and coordinating care.    Emir Rosas M.D., M.Sc.     Division of Colon and Rectal Surgery  [facility] [place] [facility]    Referring Provider:  Felisha Davis, CHAYITO  [address] [place] [address]     CC:  MD Zaire Mcleod MD

## 2018-09-17 ENCOUNTER — OFFICE VISIT (OUTPATIENT)
Dept: SURGERY | Facility: CLINIC | Age: 78
End: 2018-09-17
Payer: MEDICARE

## 2018-09-17 VITALS
HEIGHT: 66 IN | HEART RATE: 83 BPM | WEIGHT: 120.2 LBS | DIASTOLIC BLOOD PRESSURE: 66 MMHG | TEMPERATURE: 98.1 F | BODY MASS INDEX: 19.32 KG/M2 | OXYGEN SATURATION: 96 % | SYSTOLIC BLOOD PRESSURE: 113 MMHG

## 2018-09-17 DIAGNOSIS — C21.1 MALIGNANT NEOPLASM OF ANAL CANAL (H): Primary | ICD-10-CM

## 2018-09-17 ASSESSMENT — PAIN SCALES - GENERAL: PAINLEVEL: NO PAIN (0)

## 2018-09-17 NOTE — MR AVS SNAPSHOT
After Visit Summary   9/17/2018    Elizabeth Taylor    MRN: [id number]           Patient Information     Date Of Birth          1940        Visit Information        Provider Department      9/17/2018 11:30 AM Emir Rosas MD [facility] Colon and Rectal Surgery        Today's Diagnoses     Malignant neoplasm of anal canal (H)    -  1       Follow-ups after your visit        Your next 10 appointments already scheduled     Sep 21, 2018  2:40 PM CDT   Office Visit with Baron Seth MD   [facility]    [address] [place] [id number] [phone]           Bring a current list of meds and any records pertaining to this visit. For Physicals, please bring immunization records and any forms needing to be filled out. Please arrive 10 minutes early to complete paperwork.            Oct 22, 2018 11:30 AM CDT   Return Visit with GRAHAM Cook CNP   Radiation Oncology Clinic ([facility])    [facility]  1st Floor  [address] [address]   [phone]            Nov 26, 2018 11:00 AM CST   (Arrive by 10:45 AM)   Return Visit with Emir Rosas MD   [facility] Colon and Rectal Surgery ([facility])    [address]  4th Floor  [facility] [address]   [phone]            Nov 27, 2018  8:00 AM CST   [facility] Lab Draw with  [facility] DRAW   [facility] ([facility])    [address]  [address]  [facility] [address] [phone]            Nov 27, 2018  8:20 AM CST   (Arrive by 8:05 AM)   Return Visit with HAIDER Avina   [facility] ([facility])    [address]  [address]  [facility] [address] [phone]            Nov 27, 2018 10:00 AM CST   (Arrive by 9:30 AM)   RETURN SPINE with Zaire  Evangelist Phan MD   [facility] ([facility])    [address]  4th Floor  [facility] [address]   [phone]            Feb 22, 2019  9:00 AM CST   [facility] Lab Draw with  MASONIC LAB DRAW   [facility] Lab Draw ([facility])    [address]  [address]  [facility] [address]   [phone]            Feb 22, 2019  9:40 AM CST   CT ABDOMEN PELVIS W CONTRAST with UCCT1   [facility] Imaging [facility] ([facility])    [address]  1st Floor  [facility] [address]   [phone]           How do I prepare for my exam? (Food and drink instructions) To prepare: Do not eat or drink for 2 hours before your exam. If you need to take medicine, you may take it with small sips of water. (We may ask you to take liquid medicine as well.)  How do I prepare for my exam? (Other instructions) Please arrive 30 minutes early for your CT.  Once in the department you might be asked to drink water 15-20 minutes prior to your exam.  If indicated you may be asked to drink an oral contrast in advance of your CT.  If this is the case, the imaging team will let you know or be in contact with you prior to your appointment  Patients over 70 or patients with diabetes or kidney problems: If you haven t had a blood test (creatinine test) within the last 30 days, the Cardiologist/Radiologist may require you to get this test prior to your exam.  If you have diabetes:  Continue to take your metformin medication on the day of your exam  What should I wear: Please wear loose clothing, such as a sweat suit or jogging clothes. Avoid snaps, zippers and other metal. We may ask you to undress and put on a hospital gown.  How long does the exam take: Most scans take less than 20 minutes.  What should I bring: Please bring any scans or X-rays taken at other hospitals, if similar tests were done. Also bring a list of your medicines,  including vitamins, minerals and over-the-counter drugs. It is safest to leave personal items at home.  Do I need a : No  is needed.  What do I need to tell my doctor? Be sure to tell your doctor: * If you have any allergies. * If there s any chance you are pregnant. * If you are breastfeeding.  What should I do after the exam: No restrictions, You may resume normal activities.  What is this test: A CT (computed tomography) scan is a series of pictures that allows us to look inside your body. The scanner creates images of the body in cross sections, much like slices of bread. This helps us see any problems more clearly. You may receive contrast (X-ray dye) before or during your scan. You will be asked to drink the contrast.  Who should I call with questions: If you have any questions, please call the Imaging Department where you will have your exam. Directions, parking instructions, and other information is available on our website, [email or web address].            Feb 22, 2019 12:30 PM CST   (Arrive by 12:15 PM)   Return Visit with Shen Ray MD   [facility] ([facility])    [address] [place] [address]  [address] [place] [address] [phone]              Future tests that were ordered for you today     Open Future Orders        Priority Expected Expires Ordered    MR Pelvis Musclular Tissue wo & w Contrast Routine  9/17/2019 9/17/2018            Who to contact     Please call your clinic at [phone] to:    Ask questions about your health    Make or cancel appointments    Discuss your medicines    Learn about your test results    Speak to your doctor            Additional Information About Your Visit        MyChart Information     [employer] gives you secure access to your electronic health record. If you see a primary care provider, you can also send messages to your care team and make appointments. If you have questions, please call your primary  "care clinic.  If you do not have a primary care provider, please call [phone] and they will assist you.      [employer] is an electronic gateway that provides easy, online access to your medical records. With [employer], you can request a clinic appointment, read your test results, renew a prescription or communicate with your care team.     To access your existing account, please contact your [facility] or call [phone] for assistance.        Care EveryWhere ID     This is your Care EveryWhere ID. This could be used by other organizations to access your [facility] medical records  [id number]        Your Vitals Were     Pulse Temperature Height Pulse Oximetry BMI (Body Mass Index)       83 98.1  F (36.7  C) (Oral) 5' 5.98\" 96% 19.41 kg/m2        Blood Pressure from Last 3 Encounters:   09/17/18 113/66   08/31/18 119/71   08/31/18 112/63    Weight from Last 3 Encounters:   09/17/18 120 lb 3.2 oz   08/31/18 122 lb 8 oz   08/31/18 122 lb 6.4 oz               Primary Care Provider Office Phone # Fax #    Baron Dread Seth [phone]       [address] [facility] [address]        Equal Access to Services     IONA DALEY AH: Hadii aad ku hadasho Soomaali, waaxda luqadaha, qaybta kaalmada adeegyada, waxay idiin haynatalian tio real lalillian catalan. So [facility] [phone].    ATENCIÓN: Si habla español, tiene a ghosh disposición servicios gratuitos de asistencia lingüística. Llдмитрий al [phone].    We comply with applicable federal civil rights laws and [place] laws. We do not discriminate on the basis of race, color, national origin, age, disability, sex, sexual orientation, or gender identity.            Thank you!     Thank you for choosing [facility] COLON AND RECTAL SURGERY  for your care. Our goal is always to provide you with excellent care. Hearing back from our patients is one way we can continue to improve our services. Please take a few minutes to complete the written survey " that you may receive in the mail after your visit with us. Thank you!             Your Updated Medication List - Protect others around you: Learn how to safely use, store and throw away your medicines at www.disposemymeds.org.          This list is accurate as of 9/17/18 11:49 AM.  Always use your most recent med list.                   Brand Name Dispense Instructions for use Diagnosis    artificial saliva Liqd liquid      Swish and spit 5-10 mLs in mouth 4 times daily as needed for dry mouth    Mouth dryness       calcium carbonate 600 mg-vitamin D 400 units 600-400 MG-UNIT per tablet    CALTRATE    60 tablet    Take 1 tablet by mouth daily    Age-related osteoporosis with current pathological fracture, initial encounter       diclofenac 1.3 % Patch    FLECTOR     Place 1 patch onto the skin 2 times daily    Closed fracture of neck of right femur, initial encounter (H)       dimethicone 1.3 % Lotn lotion      Externally apply topically 2 times daily    Wound of buttock, unspecified laterality, subsequent encounter       menthol-zinc oxide 0.44-20.625 % Oint ointment    CALMOSEPTINE    1 Tube    Apply thick layer to irritated perianal skin 4 times per day and after bowel movements.    Malignant neoplasm of anal canal (H)       methocarbamol 500 MG tablet    ROBAXIN    120 tablet    Take 1 tablet (500 mg) by mouth 2 times daily as needed for muscle spasms    Closed fracture of one rib of right side, initial encounter       multivitamin, therapeutic Tabs tablet      Take 1 tablet by mouth daily    Age-related osteoporosis with current pathological fracture, initial encounter       oxyCODONE IR 5 MG tablet    ROXICODONE    20 tablet    Take 0.5-1 tablets (2.5-5 mg) by mouth every 3 hours as needed for moderate to severe pain    Closed displaced fracture of neck of right femur (H)       simethicone 40 MG/0.6ML suspension    MYLICON     Take 0.6 mLs (40 mg) by mouth every 6 hours as needed for cramping    Malignant  neoplasm of anal canal (H), Flatulence, eructation and gas pain       vitamin B complex with vitamin C Tabs tablet      Take 1 tablet by mouth daily        zinc oxide 40 % ointment    DESITIN     Apply topically 2 times daily    Wound of buttock, unspecified laterality, subsequent encounter

## 2018-09-17 NOTE — NURSING NOTE
"Chief Complaint   Patient presents with     Cancer     Follow up, history of anal cancer.        Vitals:    09/17/18 1118   BP: 113/66   BP Location: Left arm   Patient Position: Chair   Cuff Size: Adult Regular   Pulse: 83   Temp: 98.1  F (36.7  C)   TempSrc: Oral   SpO2: 96%   Weight: 120 lb 3.2 oz   Height: 5' 5.98\"       Body mass index is 19.41 kg/(m^2).    Sean MUELLER LPN                  "

## 2018-09-20 ENCOUNTER — PATIENT OUTREACH (OUTPATIENT)
Dept: CARE COORDINATION | Facility: CLINIC | Age: 78
End: 2018-09-20

## 2018-09-20 DIAGNOSIS — S72.009A FEMORAL NECK FRACTURE (H): Primary | ICD-10-CM

## 2018-09-20 ASSESSMENT — ACTIVITIES OF DAILY LIVING (ADL): DEPENDENT_IADLS:: INDEPENDENT

## 2018-09-20 NOTE — PROGRESS NOTES
Clinic Care Coordination Contact  [facility]/Voicemail    Referral Source: SNF/TCU    Clinical Data: Care Coordinator Outreach    Clinical Data: Patient was hospitalized at [facility]  from 7/25 to 7/30/2018 with diagnosis of Femoral neck fracture due to a fall     Right Hip Hemiarthroplasty 7/26/2018 [facility] discharge approximately 9 days ago     Outreach attempted x 1.  Spoke to Sahil /sister.  Patient is napping.  Informed of PCP visit tomorrow.    Sister reports the patient is aware of the appointment /  Patient continues home care services.  Patient is getting around the house slowly with no DME.      Plan: Care Coordinator will leave contact information with [employer]  RN  [phone] to call with future questions,concerns and when discharged from home care     Quynh Mckeon RN / Clinical Care Coordinator     [facility]   [email or web address]  Office :  [phone] / Fax :  [phone]

## 2018-09-21 ENCOUNTER — OFFICE VISIT (OUTPATIENT)
Dept: FAMILY MEDICINE | Facility: CLINIC | Age: 78
End: 2018-09-21
Payer: MEDICARE

## 2018-09-21 VITALS
TEMPERATURE: 97.4 F | HEART RATE: 74 BPM | DIASTOLIC BLOOD PRESSURE: 64 MMHG | SYSTOLIC BLOOD PRESSURE: 111 MMHG | BODY MASS INDEX: 18.96 KG/M2 | OXYGEN SATURATION: 99 % | RESPIRATION RATE: 16 BRPM | WEIGHT: 117.38 LBS

## 2018-09-21 DIAGNOSIS — M81.0 OSTEOPOROSIS, UNSPECIFIED OSTEOPOROSIS TYPE, UNSPECIFIED PATHOLOGICAL FRACTURE PRESENCE: Primary | ICD-10-CM

## 2018-09-21 DIAGNOSIS — S72.001A CLOSED FRACTURE OF NECK OF RIGHT FEMUR, INITIAL ENCOUNTER (H): ICD-10-CM

## 2018-09-21 DIAGNOSIS — Z86.19 HISTORY OF CLOSTRIDIUM DIFFICILE COLITIS: ICD-10-CM

## 2018-09-21 DIAGNOSIS — C21.1 MALIGNANT NEOPLASM OF ANAL CANAL (H): ICD-10-CM

## 2018-09-21 DIAGNOSIS — D64.9 ANEMIA, UNSPECIFIED TYPE: ICD-10-CM

## 2018-09-21 PROCEDURE — 99214 OFFICE O/P EST MOD 30 MIN: CPT | Performed by: FAMILY MEDICINE

## 2018-09-21 NOTE — PROGRESS NOTES
SUBJECTIVE:   Elizabeth Taylor is a 78 year old female who presents to clinic today for follow up after leaving a long-term care facility. She has a number of questions she wishes to address today:    1. Osteoporosis  Questions  2: anemia question  3: prophylactic antibiotics question  4: home care- she is here for a face to face visit for home care evaluation. She is styaing with her sister but overall needs assistance in all areas since she has a fall and fracture of her hip. She had about a month long TCU stay complicated by C dif colitis and is improving. Prior to the fall she was completing treatment for a anal cancer.  Goal is to get home to her apartment but for now is content at her sisters.     Problem list and histories reviewed & adjusted, as indicated.  Additional history: as documented    Patient Active Problem List   Diagnosis     CARDIOVASCULAR SCREENING; LDL GOAL LESS THAN 160     Dry eye syndrome     Malignant neoplasm of anal canal (H)     Diarrhea     Femoral neck fracture (H)     Closed fracture of neck of right femur, initial encounter (H)     Past Surgical History:   Procedure Laterality Date     APPENDECTOMY      as a child     ARTHROPLASTY HIP Right 7/26/2018    Procedure: ARTHROPLASTY HIP;  Right Hip Hemiarthroplasty;  Surgeon: Zaire Phan MD;  Location: [facility] OR     COLONOSCOPY N/A 4/13/2017    Procedure: COLONOSCOPY;  Surgeon: Juan Dos Santos MD;  Location: [facility] GI     INSERT PORT VASCULAR ACCESS Right 2/8/2018    Procedure: INSERT PORT VASCULAR ACCESS;  Place Single Lumen Venous Chest Port Placement;  Surgeon: Zaire Moreira PA-C;  Location: [facility] OR     SURGICAL HISTORY OF -       endometriosis       Social History   Substance Use Topics     Smoking status: Never Smoker     Smokeless tobacco: Never Used     Alcohol use No     Family History   Problem Relation Age of Onset     Cancer Sister      Cancer Maternal Grandmother      lymphoma     HEART DISEASE  Father      MI     Uterine Cancer Niece            Reviewed and updated as needed this visit by clinical staff  Tobacco  Allergies  Meds  Med Hx  Surg Hx  Fam Hx  Soc Hx      Reviewed and updated as needed this visit by Provider       ROS:  Constitutional, HEENT, cardiovascular, pulmonary, gi and gu systems are negative, except as otherwise noted.    OBJECTIVE:     /64  Pulse 74  Temp 97.4  F (36.3  C) (Oral)  Resp 16  Wt 117 lb 6 oz (53.2 kg)  LMP  (LMP Unknown)  SpO2 99%  Breastfeeding? No  BMI 18.96 kg/m2  Body mass index is 18.96 kg/(m^2).   GENERAL: healthy, alert and no distress  EYES: Eyes grossly normal to inspection  NECK: no adenopathy, no asymmetry, masses, or scars and thyroid normal to palpation  RESP: lungs clear to auscultation - no rales, rhonchi or wheezes  BREAST: normal without masses, tenderness or nipple discharge and no palpable axillary masses or adenopathy  CV: regular rate and rhythm, normal S1 S2, no S3 or S4, no murmur, click or rub, no peripheral edema and peripheral pulses strong  SKIN: no suspicious lesions or rashes  PSYCH: mentation appears normal, affect normal/bright    Lab Results   Component Value Date    HGB 10.0 08/31/2018       ASSESSMENT:       ICD-10-CM    1. Osteoporosis, unspecified osteoporosis type, unspecified pathological fracture presence M81.0 Vitamin D Deficiency   2. History of Clostridium difficile colitis Z86.19    3. Closed fracture of neck of right femur, initial encounter (H) S72.001A    4. Malignant neoplasm of anal canal (H) C21.1    5. Anemia, unspecified type D64.9    she is at risk of future falls and fractures. Discussed recommendation for bisphosphanate to decrease risk of fracture. She decline this. educational info given. She certainly is at risk of future falls and is currently homebound. She could benefit from home care services provided. She will check with her orthopedist regarding prophylaxis post hip surgery. Given recent dif,  minimizing unnecessary antibiotics would be appropriate. She has upcoming follow up with her oncologist and surgeon.  Her anemia is stable. Iron studies were normal when last checked.    PLAN:   No changes in medication today. Recommended bisphosphonates and immunizations but these were declined. Continue home care services.

## 2018-09-21 NOTE — MR AVS SNAPSHOT
After Visit Summary   9/21/2018    Elizabeth Taylor    MRN: [id number]           Patient Information     Date Of Birth          1940        Visit Information        Provider Department      9/21/2018 2:40 PM Baron Seth MD [facility]        Today's Diagnoses     Osteoporosis, unspecified osteoporosis type, unspecified pathological fracture presence    -  1      Care Instructions    Actonel or Fosamax are 2 of the bone building meds. Reclast is the injected one.     You can ask the antibiotics prior to dental procedures.           Follow-ups after your visit        Follow-up notes from your care team     Return in about 3 months (around 12/21/2018).      Your next 10 appointments already scheduled     Oct 22, 2018 11:30 AM CDT   Return Visit with GRAHAM Cook CNP   Radiation Oncology Clinic ([facility])    [facility]  1st Floor  [address] [address]   [phone]            Nov 26, 2018 11:00 AM CST   (Arrive by 10:45 AM)   Return Visit with Emir Rosas MD   [facility] Colon and Rectal Surgery ([facility] [place] [facility])    [address] [place] [address]  4th [facility] [address]   [phone]            Nov 27, 2018  8:00 AM CST   Masonic Lab Draw with  MASONIC LAB DRAW   [facility] Draw ([facility])    [address] [place] [address]  [address]  [facility] [place] [address]   [phone]            Nov 27, 2018  8:20 AM CST   (Arrive by 8:05 AM)   Return Visit with HAIDER Avina   [facility] ([facility] [place] [facility])    [address] [place] [address]  [address]  [facility] [place] [address]   [phone]            Nov 27, 2018 10:00 AM CST   (Arrive by 9:30 AM)   RETURN SPINE with Zaire Phan MD   [facility] Orth[facility] ([facility])    [address] [place] [address]  Floor  [facility] [address]   [phone]            Feb 22, 2019  9:00 AM CST   Masonic Lab Draw with  MASONIC LAB DRAW   [facility] Masonic Lab Draw ([facility])    [address]  [address]  [facility] [address]   [phone]            Feb 22, 2019  9:40 AM CST   CT ABDOMEN PELVIS W CONTRAST with UCCT1   [facility] CT ([facility])    [address]  1st Floor  [facility] [address]   [phone]           How do I prepare for my exam? (Food and drink instructions) To prepare: Do not eat or drink for 2 hours before your exam. If you need to take medicine, you may take it with small sips of water. (We may ask you to take liquid medicine as well.)  How do I prepare for my exam? (Other instructions) Please arrive 30 minutes early for your CT.  Once in the department you might be asked to drink water 15-20 minutes prior to your exam.  If indicated you may be asked to drink an oral contrast in advance of your CT.  If this is the case, the imaging team will let you know or be in contact with you prior to your appointment  Patients over 70 or patients with diabetes or kidney problems: If you haven t had a blood test (creatinine test) within the last 30 days, the Cardiologist/Radiologist may require you to get this test prior to your exam.  If you have diabetes:  Continue to take your metformin medication on the day of your exam  What should I wear: Please wear loose clothing, such as a sweat suit or jogging clothes. Avoid snaps, zippers and other metal. We may ask you to undress and put on a hospital gown.  How long does the exam take: Most scans take less than 20 minutes.  What should I bring: Please bring any scans or X-rays taken at other hospitals, if similar tests were done. Also bring a list of your medicines, including vitamins, minerals and over-the-counter drugs. It is safest to leave personal items at home.  Do I need a  : No  is needed.  What do I need to tell my doctor? Be sure to tell your doctor: * If you have any allergies. * If there s any chance you are pregnant. * If you are breastfeeding.  What should I do after the exam: No restrictions, You may resume normal activities.  What is this test: A CT (computed tomography) scan is a series of pictures that allows us to look inside your body. The scanner creates images of the body in cross sections, much like slices of bread. This helps us see any problems more clearly. You may receive contrast (X-ray dye) before or during your scan. You will be asked to drink the contrast.  Who should I call with questions: If you have any questions, please call the Imaging Department where you will have your exam. Directions, parking instructions, and other information is available on our website, [email or web address].            Feb 22, 2019 12:30 PM CST   (Arrive by 12:15 PM)   Return Visit with Shen Ray MD   [facility] ([facility] [place] [facility])    [address] [place] [address]  [address] [place] [address] [phone]              Future tests that were ordered for you today     Open Future Orders        Priority Expected Expires Ordered    Vitamin D Deficiency Routine  9/21/2019 9/21/2018            Who to contact     If you have questions or need follow up information about today's clinic visit or your schedule please contact [facility] directly at [phone].  Normal or non-critical lab and imaging results will be communicated to you by MyChart, letter or phone within 4 business days after the clinic has received the results. If you do not hear from us within 7 days, please contact the clinic through MyChart or phone. If you have a critical or abnormal lab result, we will notify you by phone as soon as possible.  Submit refill requests through SiteJabber or call your pharmacy and they will forward the refill request to  us. Please allow 3 business days for your refill to be completed.          Additional Information About Your Visit        "[employer]hart Information     [employer] gives you secure access to your electronic health record. If you see a primary care provider, you can also send messages to your care team and make appointments. If you have questions, please call your primary care clinic.  If you do not have a primary care provider, please call [phone] and they will assist you.        Care EveryWhere ID     This is your Care EveryWhere ID. This could be used by other organizations to access your [place] medical records  [id number]        Your Vitals Were     Pulse Temperature Respirations Last Period Pulse Oximetry Breastfeeding?    74 97.4  F (36.3  C) (Oral) 16 (LMP Unknown) 99% No    BMI (Body Mass Index)                   18.96 kg/m2            Blood Pressure from Last 3 Encounters:   09/21/18 111/64   09/17/18 113/66   08/31/18 119/71    Weight from Last 3 Encounters:   09/21/18 117 lb 6 oz (53.2 kg)   09/17/18 120 lb 3.2 oz (54.5 kg)   08/31/18 122 lb 8 oz (55.6 kg)                 Today's Medication Changes          These changes are accurate as of 9/21/18  4:14 PM.  If you have any questions, ask your nurse or doctor.               Stop taking these medicines if you haven't already. Please contact your care team if you have questions.     diclofenac 1.3 % Patch   Commonly known as:  FLECTOR   Stopped by:  Baron Seth MD           dimethicone 1.3 % Lotn lotion   Stopped by:  Baron Seth MD           menthol-zinc oxide 0.44-20.625 % Oint ointment   Commonly known as:  CALMOSEPTINE   Stopped by:  Baron Seth MD           methocarbamol 500 MG tablet   Commonly known as:  ROBAXIN   Stopped by:  Baron Seth MD           multivitamin, therapeutic Tabs tablet   Stopped by:  Baron Seth MD           oxyCODONE IR 5 MG tablet   Commonly known as:  ROXICODONE   Stopped by:  aBron Seth MD            zinc oxide 40 % ointment   Commonly known as:  DESITIN   Stopped by:  Baron Seth MD                    Primary Care Provider Office Phone # Fax #    Baron Seth [phone] [address]  [facility] [address]        Equal Access to Services     [facility] EDI : Hadii aad ku hadasho Soomaali, waaxda luqadaha, qaybta kaalmada adeegyada, waxay idiin haynatalian adeargentina real lalillian ah. So [facility] [phone].    ATENCIÓN: Si habla español, tiene a ghosh disposición servicios gratuitos de asistencia lingüística. Jessica al [phone].    We comply with applicable federal civil rights laws and [place] laws. We do not discriminate on the basis of race, color, national origin, age, disability, sex, sexual orientation, or gender identity.            Thank you!     Thank you for choosing [facility]  for your care. Our goal is always to provide you with excellent care. Hearing back from our patients is one way we can continue to improve our services. Please take a few minutes to complete the written survey that you may receive in the mail after your visit with us. Thank you!             Your Updated Medication List - Protect others around you: Learn how to safely use, store and throw away your medicines at www.disposemymeds.org.          This list is accurate as of 9/21/18  4:14 PM.  Always use your most recent med list.                   Brand Name Dispense Instructions for use Diagnosis    artificial saliva Liqd liquid      Swish and spit 5-10 mLs in mouth 4 times daily as needed for dry mouth    Mouth dryness       calcium carbonate 600 mg-vitamin D 400 units 600-400 MG-UNIT per tablet    CALTRATE    60 tablet    Take 1 tablet by mouth daily    Age-related osteoporosis with current pathological fracture, initial encounter       simethicone 40 MG/0.6ML suspension    MYLICON     Take 0.6 mLs (40 mg) by mouth every 6 hours as needed for cramping    Malignant neoplasm of anal  canal (H), Flatulence, eructation and gas pain       TYLENOL PO           vitamin B complex with vitamin C Tabs tablet      Take 1 tablet by mouth daily

## 2018-09-21 NOTE — PATIENT INSTRUCTIONS
Actonel or Fosamax are 2 of the bone building meds. Reclast is the injected one.     You can ask the antibiotics prior to dental procedures.

## 2018-09-28 ENCOUNTER — TELEPHONE (OUTPATIENT)
Dept: FAMILY MEDICINE | Facility: CLINIC | Age: 78
End: 2018-09-28

## 2018-09-28 DIAGNOSIS — R35.0 URINARY FREQUENCY: Primary | ICD-10-CM

## 2018-09-28 NOTE — TELEPHONE ENCOUNTER
Home care calling stating Elizabeth is urinating every hour, no pain or discomfort. Per Dr. Rolo shankar to do a UA/UC.

## 2018-10-05 ENCOUNTER — TELEPHONE (OUTPATIENT)
Dept: FAMILY MEDICINE | Facility: CLINIC | Age: 78
End: 2018-10-05

## 2018-10-05 DIAGNOSIS — R21 RASH AND NONSPECIFIC SKIN ERUPTION: Primary | ICD-10-CM

## 2018-10-05 NOTE — TELEPHONE ENCOUNTER
Reason for call:  Patient reporting a symptom    Symptom or request: Inflamed perineum    Duration (how long have symptoms been present):     Have you been treated for this before?     Additional comments: The patients home care nurse is wondering if an anti fungal medication would be appropriate.    Phone Number patient can be reached at:  Other phone number:  [phone]    Best Time:      Can we leave a detailed message on this number:  YES    Call taken on 10/5/2018 at 3:24 PM by Tennille Gruber

## 2018-10-08 RX ORDER — NYSTATIN 100000 U/G
CREAM TOPICAL 3 TIMES DAILY
Qty: 30 G | Refills: 1 | Status: SHIPPED | OUTPATIENT
Start: 2018-10-08 | End: 2018-10-22

## 2018-10-08 NOTE — TELEPHONE ENCOUNTER
Dr. Seth the nurse is suggesting some nystatin cream as it appears it may be a fungal in nature.  Patient is using very good hygiene.  Would you be willing to try that?    Did tell her about the gyn recommendation.  Stephanie Werner RN

## 2018-10-08 NOTE — TELEPHONE ENCOUNTER
Orders for RX placed and told home care to let us know if the skin area improves.  Stephanie Werner RN

## 2018-10-08 NOTE — TELEPHONE ENCOUNTER
THIS SUMMER WE USED butt paste. I haven't seen it lately but that could be tried. Or she could make it in to see gyn and they could take a look .    Baron Seth

## 2018-10-16 ENCOUNTER — OFFICE VISIT (OUTPATIENT)
Dept: FAMILY MEDICINE | Facility: CLINIC | Age: 78
End: 2018-10-16
Payer: MEDICARE

## 2018-10-16 ENCOUNTER — RADIANT APPOINTMENT (OUTPATIENT)
Dept: GENERAL RADIOLOGY | Facility: CLINIC | Age: 78
End: 2018-10-16
Attending: FAMILY MEDICINE
Payer: MEDICARE

## 2018-10-16 VITALS
OXYGEN SATURATION: 95 % | HEART RATE: 97 BPM | SYSTOLIC BLOOD PRESSURE: 110 MMHG | TEMPERATURE: 98.3 F | DIASTOLIC BLOOD PRESSURE: 74 MMHG | RESPIRATION RATE: 16 BRPM

## 2018-10-16 DIAGNOSIS — M25.552 HIP PAIN, LEFT: Primary | ICD-10-CM

## 2018-10-16 DIAGNOSIS — M25.552 HIP PAIN, LEFT: ICD-10-CM

## 2018-10-16 PROBLEM — C21.1 MALIGNANT NEOPLASM OF ANAL CANAL (H): Status: ACTIVE | Noted: 2018-01-12

## 2018-10-16 PROCEDURE — 99213 OFFICE O/P EST LOW 20 MIN: CPT | Performed by: FAMILY MEDICINE

## 2018-10-16 PROCEDURE — 73502 X-RAY EXAM HIP UNI 2-3 VIEWS: CPT

## 2018-10-16 NOTE — MR AVS SNAPSHOT
After Visit Summary   10/16/2018    Elizabeth Taylor    MRN: [id number]           Patient Information     Date Of Birth          1940        Visit Information        Provider Department      10/16/2018 3:20 PM Mara Varela MD [facility]        Today's Diagnoses     Hip pain, left    -  1       Follow-ups after your visit        Follow-up notes from your care team     Return if symptoms worsen or fail to improve.      Your next 10 appointments already scheduled     Oct 22, 2018 11:30 AM CDT   Return Visit with GRAHAM Cook CNP   Radiation Oncology Clinic ([facility])    [facility]  1st Floor  [address] [address]   [phone]            Nov 26, 2018 11:00 AM CST   (Arrive by 10:45 AM)   Return Visit with Emir Rosas MD   [facility] Colon and Rectal Surgery ([facility])    [address] [place] [address]  4th [facility] [address]   [phone]            Nov 27, 2018  8:00 AM CST   Masonic Lab Draw with  MASONIC LAB DRAW   [facility] Masonic Lab Draw ([facility])    [address] [place] [address]  [address]  [facility] [address]   [phone]            Nov 27, 2018  8:20 AM CST   (Arrive by 8:05 AM)   Return Visit with HAIDER Avina   [facility] ([facility])    [address] [place] [address]  [address]  [facility] [address]   [phone]            Nov 27, 2018 10:00 AM CST   (Arrive by 9:30 AM)   RETURN SPINE with Zaire Phan MD   [facility] Orthopaedic Clinic ([facility])    [address] [place] [address]  4th Floor  [facility] [address]   [phone]            Feb 22, 2019  9:00 AM CST   Masonic Lab Draw with UC MASONIC LAB DRAW   [facility] Masonic Lab Draw ([facility])    [address] [place] [address]  [address]  [facility] [address]   [phone]            Feb 22, 2019  9:40 AM CST   CT ABDOMEN PELVIS W CONTRAST with UCCT1   [facility] CT ([facility])    [address]  1st Floor  [facility] [address]   [phone]           How do I prepare for my exam? (Food and drink instructions) To prepare: Do not eat or drink for 2 hours before your exam. If you need to take medicine, you may take it with small sips of water. (We may ask you to take liquid medicine as well.)  How do I prepare for my exam? (Other instructions) Please arrive 30 minutes early for your CT.  Once in the department you might be asked to drink water 15-20 minutes prior to your exam.  If indicated you may be asked to drink an oral contrast in advance of your CT.  If this is the case, the imaging team will let you know or be in contact with you prior to your appointment  Patients over 70 or patients with diabetes or kidney problems: If you haven t had a blood test (creatinine test) within the last 30 days, the Cardiologist/Radiologist may require you to get this test prior to your exam.  If you have diabetes:  Continue to take your metformin medication on the day of your exam  What should I wear: Please wear loose clothing, such as a sweat suit or jogging clothes. Avoid snaps, zippers and other metal. We may ask you to undress and put on a hospital gown.  How long does the exam take: Most scans take less than 20 minutes.  What should I bring: Please bring any scans or X-rays taken at other hospitals, if similar tests were done. Also bring a list of your medicines, including vitamins, minerals and over-the-counter drugs. It is safest to leave personal items at home.  Do I need a : No  is needed.  What do I need to tell my doctor? Be sure to tell your doctor: * If you have any allergies. * If there s any chance you are pregnant. * If you are breastfeeding.  What should I do after the exam:  No restrictions, You may resume normal activities.  What is this test: A CT (computed tomography) scan is a series of pictures that allows us to look inside your body. The scanner creates images of the body in cross sections, much like slices of bread. This helps us see any problems more clearly. You may receive contrast (X-ray dye) before or during your scan. You will be asked to drink the contrast.  Who should I call with questions: If you have any questions, please call the Imaging Department where you will have your exam. Directions, parking instructions, and other information is available on our website, [email or web address].            Feb 22, 2019 12:30 PM CST   (Arrive by 12:15 PM)   Return Visit with Shen Ray MD   [facility] ([facility])    [address]  [address]  [facility] [address] [phone]              Who to contact     If you have questions or need follow up information about today's clinic visit or your schedule please contact [facility] directly at [phone].  Normal or non-critical lab and imaging results will be communicated to you by Limonetikhart, letter or phone within 4 business days after the clinic has received the results. If you do not hear from us within 7 days, please contact the clinic through [employer] or phone. If you have a critical or abnormal lab result, we will notify you by phone as soon as possible.  Submit refill requests through [employer] or call your pharmacy and they will forward the refill request to us. Please allow 3 business days for your refill to be completed.          Additional Information About Your Visit        [employer] Information     [employer] gives you secure access to your electronic health record. If you see a primary care provider, you can also send messages to your care team and make appointments. If you have questions, please call your primary care clinic.  If you do not have a  primary care provider, please call [phone] and they will assist you.        Care EveryWhere ID     This is your Care EveryWhere ID. This could be used by other organizations to access your [place] medical records  [id number]        Your Vitals Were     Pulse Temperature Respirations Last Period Pulse Oximetry       97 98.3  F (36.8  C) (Oral) 16 (LMP Unknown) 95%        Blood Pressure from Last 3 Encounters:   10/16/18 110/74   09/21/18 111/64   09/17/18 113/66    Weight from Last 3 Encounters:   09/21/18 117 lb 6 oz (53.2 kg)   09/17/18 120 lb 3.2 oz (54.5 kg)   08/31/18 122 lb 8 oz (55.6 kg)               Primary Care Provider Office Phone # Fax #    Baron Seth [phone]       [address]  [facility] [address]        Equal Access to Services     IONA DALEY : Hadii aad ku hadasho Soomaali, waaxda luqadaha, qaybta kaalmada adeegyada, cyndy walton . So [facility] [phone].    ATENCIÓN: Si habla español, tiene a ghosh disposición servicios gratuitos de asistencia lingüística. Llame al [phone].    We comply with applicable federal civil rights laws and [place] laws. We do not discriminate on the basis of race, color, national origin, age, disability, sex, sexual orientation, or gender identity.            Thank you!     Thank you for choosing [facility]  for your care. Our goal is always to provide you with excellent care. Hearing back from our patients is one way we can continue to improve our services. Please take a few minutes to complete the written survey that you may receive in the mail after your visit with us. Thank you!             Your Updated Medication List - Protect others around you: Learn how to safely use, store and throw away your medicines at www.disposemymeds.org.          This list is accurate as of 10/16/18 11:59 PM.  Always use your most recent med list.                   Brand Name Dispense Instructions for use  Diagnosis    artificial saliva Liqd liquid      Swish and spit 5-10 mLs in mouth 4 times daily as needed for dry mouth    Mouth dryness       calcium carbonate 600 mg-vitamin D 400 units 600-400 MG-UNIT per tablet    CALTRATE    60 tablet    Take 1 tablet by mouth daily    Age-related osteoporosis with current pathological fracture, initial encounter       nystatin cream    MYCOSTATIN    30 g    Apply topically 3 times daily for 14 days Apply to the affected perineal  Area.    Rash and nonspecific skin eruption       simethicone 40 MG/0.6ML suspension    MYLICON     Take 0.6 mLs (40 mg) by mouth every 6 hours as needed for cramping    Malignant neoplasm of anal canal (H), Flatulence, eructation and gas pain       TYLENOL PO           vitamin B complex with vitamin C Tabs tablet      Take 1 tablet by mouth daily

## 2018-10-16 NOTE — PROGRESS NOTES
SUBJECTIVE:   Elizabeth Taylor is a 78 year old female who presents to clinic today for the following health issues:    Musculoskeletal problem/pain      Duration: started last Wed or Thursday when fell     Description  Location: LEFT side under butt cheek pain     Intensity:  moderate    Accompanying signs and symptoms: none    History  Previous similar problem: no   Previous evaluation:  none    Precipitating or alleviating factors:  Trauma or overuse: YES  Aggravating factors include: standing and putting weight on it     Therapies tried and outcome: acetaminophen    Got up in middle of early AM-- getting out of bed to use bedside commode and tipped it.  HIt LEFT lower buttock area and remains painful.  Notes hx of recent RIGHT femoral fracture s/p RIGHT hemiarthroplasty 7- secondary to fall 2 weeks prior to presenting with pain.  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  Patient able to walk but still with pain one week later.    Problem list and histories reviewed & adjusted, as indicated.  Additional history: as documented    Patient Active Problem List   Diagnosis     Dry eye syndrome     Malignant neoplasm of anal canal (H)     Diarrhea     Femoral neck fracture (H)     Closed fracture of neck of right femur, initial encounter (H)     Past Surgical History:   Procedure Laterality Date     APPENDECTOMY      as a child     ARTHROPLASTY HIP Right 7/26/2018    Procedure: ARTHROPLASTY HIP;  Right Hip Hemiarthroplasty;  Surgeon: Zaire Phan MD;  Location: [facility] OR     COLONOSCOPY N/A 4/13/2017    Procedure: COLONOSCOPY;  Surgeon: Juan Dos Santos MD;  Location: [facility] GI     INSERT PORT VASCULAR ACCESS Right 2/8/2018    Procedure: INSERT PORT VASCULAR ACCESS;  Place Single Lumen Venous Chest Port Placement;  Surgeon: Zaire Moreira PA-C;  Location: [facility]     SURGICAL HISTORY OF -       endometriosis       Social History   Substance Use Topics     Smoking  status: Never Smoker     Smokeless tobacco: Never Used     Alcohol use No     Family History   Problem Relation Age of Onset     Cancer Sister      Cancer Maternal Grandmother      lymphoma     HEART DISEASE Father      MI     Uterine Cancer Niece          Current Outpatient Prescriptions   Medication Sig Dispense Refill     Acetaminophen (TYLENOL PO)        calcium-vitamin D (CALTRATE) 600-400 MG-UNIT per tablet Take 1 tablet by mouth daily 60 tablet      nystatin (MYCOSTATIN) cream Apply topically 3 times daily for 14 days Apply to the affected perineal  Area. 30 g 1     simethicone (MYLICON) 40 MG/0.6ML suspension Take 0.6 mLs (40 mg) by mouth every 6 hours as needed for cramping       vitamin B complex with vitamin C (VITAMIN  B COMPLEX) TABS tablet Take 1 tablet by mouth daily       artificial saliva (BIOTENE DRY MOUTHWASH) LIQD liquid Swish and spit 5-10 mLs in mouth 4 times daily as needed for dry mouth (Patient not taking: Reported on 10/16/2018)       Allergies   Allergen Reactions     Darvon [Propoxyphene]      Had reaction in teen years     Penicillins      As a child     Ketoconazole Rash     Recent Labs   Lab Test  08/31/18   1121  07/29/18   0650   07/27/18   0759   07/25/18   1815  06/22/18   1134   04/02/18   0633   03/29/18   0633   03/17/18   1322   03/16/17   1439   TRIG   --    --    --    --    --    --    --    --   150*   --   90   --    --    --    --    ALT  16   --    --    --    --   19  20   < >  14   < >  15   < >  25   < >   --    CR  0.46*   --    --   0.53   < >  0.62  0.60   < >  0.45*   < >  0.62   < >  0.79   < >   --    GFRESTIMATED  >90   --    --   >90   < >  >90  >90   < >  >90   < >  >90   < >  71   < >   --    GFRESTBLACK  >90   --    --   >90   < >  >90  >90   < >  >90   < >  >90   < >  85   < >   --    POTASSIUM  3.8  4.4   < >  3.9   < >  3.9  4.2   < >  4.4   < >  3.6   < >  3.4   < >   --    TSH   --    --    --    --    --    --    --    --    --    --    --    --    3.87   --   2.01    < > = values in this interval not displayed.        Reviewed and updated as needed this visit by clinical staff  Allergies  Meds       Reviewed and updated as needed this visit by Provider         ROS:  Constitutional, HEENT, cardiovascular, pulmonary, gi and gu systems are negative, except as otherwise noted.    OBJECTIVE:     /74  Pulse 97  Temp 98.3  F (36.8  C) (Oral)  Resp 16  LMP  (LMP Unknown)  SpO2 95%  There is no height or weight on file to calculate BMI.  GENERAL: healthy, alert and no distress  EYES: Eyes grossly normal to inspection, PERRL and conjunctivae and sclerae normal  NECK: no adenopathy, no asymmetry, masses, or scars and thyroid normal to palpation  RESP: lungs clear to auscultation - no rales, rhonchi or wheezes  CV: regular rate and rhythm, normal S1 S2, no S3 or S4, no murmur, click or rub, no peripheral edema and peripheral pulses strong  ABDOMEN: soft, nontender, no hepatosplenomegaly, no masses and bowel sounds normal  MS: no gross musculoskeletal defects noted, no edema, walks with walker slowly, feebly  SKIN: no suspicious lesions or rashes  PSYCH: mentation appears normal, affect normal/bright    LEFT hip xray negative for fracture.    ASSESSMENT/PLAN:     1. Hip pain, left    - XR Hip Left 2-3 Views; Future    Reassured no fracture noted on xrays.  Advised close Follow-up if symptoms should change or worsen.  High FALLS risk--    Mara Varela MD  [facility]

## 2018-10-17 ENCOUNTER — PATIENT OUTREACH (OUTPATIENT)
Dept: CARE COORDINATION | Facility: CLINIC | Age: 78
End: 2018-10-17

## 2018-10-17 DIAGNOSIS — S72.009A FEMORAL NECK FRACTURE (H): Primary | ICD-10-CM

## 2018-10-17 ASSESSMENT — ACTIVITIES OF DAILY LIVING (ADL): DEPENDENT_IADLS:: INDEPENDENT

## 2018-10-17 NOTE — PROGRESS NOTES
Clinic Care Coordination Contact  [facility]    Referral Source: SNF/TCU    Clinical Data: Patient was hospitalized at [facility]  from 7/25 to 7/30/2018 with diagnosis of Femoral neck fracture due to a fall      Right Hip Hemiarthroplasty 7/26/2018     Clinical Data: Care Coordinator Outreach    Outreach attempted x 2.  Left message on voicemail with call back information and requested return call.    Plan: Care Coordinator will await a return call  Care Coordinator will do no further outreaches at this time.    Quynh Mckeon RN / Clinical Care Coordinator     [facility]   [email or web address]  Office :  [phone] / Fax :  [phone]

## 2018-10-22 ENCOUNTER — OFFICE VISIT (OUTPATIENT)
Dept: RADIATION ONCOLOGY | Facility: CLINIC | Age: 78
End: 2018-10-22
Attending: RADIOLOGY
Payer: MEDICARE

## 2018-10-22 VITALS
HEART RATE: 78 BPM | SYSTOLIC BLOOD PRESSURE: 125 MMHG | BODY MASS INDEX: 18.86 KG/M2 | DIASTOLIC BLOOD PRESSURE: 74 MMHG | WEIGHT: 116.8 LBS

## 2018-10-22 DIAGNOSIS — C21.1 MALIGNANT NEOPLASM OF ANAL CANAL (H): Primary | ICD-10-CM

## 2018-10-22 PROCEDURE — G0463 HOSPITAL OUTPT CLINIC VISIT: HCPCS | Performed by: NURSE PRACTITIONER

## 2018-10-22 NOTE — NURSING NOTE
FOLLOW-UP VISIT    Patient Name: Elizabeth Taylor      : 1940     Age: 78 year old        ______________________________________________________________________________     Chief Complaint   Patient presents with     Cancer     Follow up for radiation     /74  Pulse 78  Wt 53 kg (116 lb 12.8 oz)  LMP  (LMP Unknown)  BMI 18.86 kg/m2     Pain  Denies    Labs  Other Labs: No    Imaging  None    Other Appointments:     MD Name: Appointment Date:   MD Name: Appointment Date:   MD Name: Appointment Date:   Other Appointment Notes:     Residual Radiation side effect: formed stools, urgent occasionally  Has never used dilator since radiation therapy- did try inserting finger about 3 weeks ago, noticed some bleeding    Additional Instructions:     Nurse face-to-face time: Level 3:  10 min face to face time

## 2018-10-22 NOTE — LETTER
10/22/2018        RE: Elizabeth Taylor  [address]  [facility] [address]     Dear Colleague,    Thank you for referring your patient, Elizabeth Taylor, to the RADIATION ONCOLOGY CLINIC. Please see a copy of my visit note below.       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 Completed 3/23/2018.    SUBJECTIVE:   Elizabeth Taylor is a 78 year old female who was diagnosed with a locally advanced squamous cell carcinoma of the anal canal after presenting with progressive anal pain and small amounts of bright red blood per rectum. Staging evaluation revealed a 2.5 cm mass located approximately 1 cm from the anal verge with involvement of the internal anal sphincter in addition to 2 FDG-avid left inguinal nodes.    Ms. Taylor underwent treatment with definitive chemoradiotherapy with curative intent. The primary tumor was treated to a total dose of 54 Gy while the node-positive left groin received 50.4 Gy and the right groin and pelvis was treated to 45 Gy in 30 daily fractions using a simultaneous integrated boost technique. Radiotherapy was delivered with concurrent Mitomycin-C and infusional 5-FU with Mitomycin held with her second cycle of therapy due to concern for treatment-related toxicities given her age and borderline functional status. Ms. Taylor tolerated the initiation of therapy relatively well although approximately midway through her  treatment course developing worsening diarrhea, eventually requiring inpatient admission during her final week of radiation.     Since her prior follow-up visit, Ms. Taylor presented with right hip and leg swelling on 7/25/2018 after having suffered a mechanical fall approximately 2 weeks prior. Workup revealed a right femoral neck fracture and she underwent a right hip hemiarthroplasty on 7/26/2018. Her follow-up with Dr. Rosas of colorectal surgery scheduled for 7/30/2018 was canceled while she was inpatient for surgery.  She did see Dr. Rosas on 9/16/18 with plan for MR in 6 months and anoscopy.  She does have another follow up on 11/26/18.    She is now staying with a friend and alternating with her sister.  She states her bowel movements are fairly regular.  She has slight urgency, but has trained her self to go to the bathroom after meals and that has helped.  She denies any blood.  She has not been using her dilator ever.  She did attempt to insert a finger and noticed it was tight, but she could insert it.  She is not sure where she placed her dilator.  She has had some yeast infections that have caused some redness and irritation of the labia.  She has a home care nurse that has been taking care of that.    PHYSICAL EXAM:  /74  Pulse 78  Wt 53 kg (116 lb 12.8 oz)  LMP  (LMP Unknown)  BMI 18.86 kg/m2  Gen: Mildly fatigued-appearing but in no acute distress. She is using a walker and moves very slowly.  Eyes: PERRL, EOMI  Pulm: No wheezing, stridor or respiratory distress  CV: Well-perfused, no cyanosis,   Rectal: External rectal exam showed well healed skin. BRITTNEY was done and was slightly uncomfortable for patient intially, but going very slowly she seemed to tolerate it.  Exam was normal.   Skin:  Hypopigmentation/scarring within the bilateral groin (left > right). No areas of residual desquamation or ulceration.    LABS AND IMAGING:  Reviewed.  No new imaging.     IMPRESSION:   Ms. Taylor is  a 78 year old female with a previous cT2 N1a M0 squamous cell carcinoma of the anal canal status post definitive chemoradiotherapy. She continues to make a slow recovery from her prior treatment-related toxicities and associated post-treatment medical issues (right femoral neck fracture and recent C diff infection). There is no clinical evidence, albeit on limited exam, concerning for progressive/recurrent disease.      PLAN:     1. Continued follow-up with medical oncology as scheduled on 11/27/18.  2. Follow-up with Dr. Rosas of colorectal surgery for ongoing disease surveillance scheduled on 11/26/18.  3. Once again discussed routine vaginal dilator use to prevent stenosis of the vaginal introitus.  She was given a new dilator and instructions today.       Joanie Gomez NP  Dept of Radiation Oncology  [facility]

## 2018-10-22 NOTE — MR AVS SNAPSHOT
After Visit Summary   10/22/2018    Elizabeth Taylor    MRN: [id number]           Patient Information     Date Of Birth          1940        Visit Information        Provider Department      10/22/2018 11:30 AM Joanie Gomez APRN CNP Radiation Oncology Clinic        Today's Diagnoses     Malignant neoplasm of anal canal (H)    -  1       Follow-ups after your visit        Your next 10 appointments already scheduled     Nov 26, 2018 11:00 AM CST   (Arrive by 10:45 AM)   Return Visit with Emir Rosas MD   [facility] ([facility])    [address] [place] [address]  [address] [facility] [address]   [phone]            Nov 27, 2018  8:00 AM CST   [facility] Draw with  [facility] DRAW   [facility] Draw ([facility])    [address] [place] [address]  [address]  [facility] [address]   [phone]            Nov 27, 2018  8:20 AM CST   (Arrive by 8:05 AM)   Return Visit with HAIDER Avina   [facility] ([facility])    [address] [place] [address]  [address]  [facility] [address]   [phone]            Nov 27, 2018 10:00 AM CST   (Arrive by 9:30 AM)   RETURN SPINE with Zaire Phan MD   [facility] ([facility])    [address] [place] [address]  [address] [facility] [address]   [phone]            Feb 22, 2019  9:00 AM CST   [facility] Draw with [facility]RAW   [facility] Draw ([facility])    [address] [place] [address]  [address]  [facility] [address]   [phone]            Feb 22, 2019  9:40 AM CST   CT ABDOMEN PELVIS W CONTRAST with [facility]   [facility] ([facility])    [address] [place] [address]  1st Floor  [facility] [address]   [phone]           How do I prepare for my exam? (Food and drink instructions) To  prepare: Do not eat or drink for 2 hours before your exam. If you need to take medicine, you may take it with small sips of water. (We may ask you to take liquid medicine as well.)  How do I prepare for my exam? (Other instructions) Please arrive 30 minutes early for your CT.  Once in the department you might be asked to drink water 15-20 minutes prior to your exam.  If indicated you may be asked to drink an oral contrast in advance of your CT.  If this is the case, the imaging team will let you know or be in contact with you prior to your appointment  Patients over 70 or patients with diabetes or kidney problems: If you haven t had a blood test (creatinine test) within the last 30 days, the Cardiologist/Radiologist may require you to get this test prior to your exam.  If you have diabetes:  Continue to take your metformin medication on the day of your exam  What should I wear: Please wear loose clothing, such as a sweat suit or jogging clothes. Avoid snaps, zippers and other metal. We may ask you to undress and put on a hospital gown.  How long does the exam take: Most scans take less than 20 minutes.  What should I bring: Please bring any scans or X-rays taken at other hospitals, if similar tests were done. Also bring a list of your medicines, including vitamins, minerals and over-the-counter drugs. It is safest to leave personal items at home.  Do I need a : No  is needed.  What do I need to tell my doctor? Be sure to tell your doctor: * If you have any allergies. * If there s any chance you are pregnant. * If you are breastfeeding.  What should I do after the exam: No restrictions, You may resume normal activities.  What is this test: A CT (computed tomography) scan is a series of pictures that allows us to look inside your body. The scanner creates images of the body in cross sections, much like slices of bread. This helps us see any problems more clearly. You may receive contrast (X-ray dye) before  or during your scan. You will be asked to drink the contrast.  Who should I call with questions: If you have any questions, please call the Imaging Department where you will have your exam. Directions, parking instructions, and other information is available on our website, [email or web address].            Feb 22, 2019 12:30 PM CST   (Arrive by 12:15 PM)   Return Visit with Shen Ray MD   [facility] ([facility])    [address] [place] [address]  [address]  [facility] [address] [phone]              Who to contact     Please call your clinic at [phone] to:    Ask questions about your health    Make or cancel appointments    Discuss your medicines    Learn about your test results    Speak to your doctor            Additional Information About Your Visit        [employer] Information     [employer] gives you secure access to your electronic health record. If you see a primary care provider, you can also send messages to your care team and make appointments. If you have questions, please call your primary care clinic.  If you do not have a primary care provider, please call [phone] and they will assist you.      [employer] is an electronic gateway that provides easy, online access to your medical records. With [employer], you can request a clinic appointment, read your test results, renew a prescription or communicate with your care team.     To access your existing account, please contact your [facility] or call [phone] for assistance.        Care EveryWhere ID     This is your Care EveryWhere ID. This could be used by other organizations to access your [place] medical records  [id number]        Your Vitals Were     Pulse Last Period BMI (Body Mass Index)             78 (LMP Unknown) 18.86 kg/m2          Blood Pressure from Last 3 Encounters:   10/22/18 125/74   10/16/18 110/74   09/21/18 111/64    Weight from Last 3 Encounters:    10/22/18 53 kg (116 lb 12.8 oz)   09/21/18 53.2 kg (117 lb 6 oz)   09/17/18 54.5 kg (120 lb 3.2 oz)              Today, you had the following     No orders found for display       Primary Care Provider Office Phone # Fax #    Baron Seth [phone] [address]  [facility] [address]        Equal Access to Services     IONA DALEY : Hadii aad ku hadasho Soomaali, waaxda luqadaha, qaybta kaalmada adeegyada, waxay idiin hayaan adeeg khjosephshirley lalillian . So [facility] [phone].    ATENCIÓN: Si cristian sadler, tiene a ghosh disposición servicios gratuitos de asistencia lingüística. [facility] [phone].    We comply with applicable federal civil rights laws and [place] laws. We do not discriminate on the basis of race, color, national origin, age, disability, sex, sexual orientation, or gender identity.            Thank you!     Thank you for choosing [facility]  for your care. Our goal is always to provide you with excellent care. Hearing back from our patients is one way we can continue to improve our services. Please take a few minutes to complete the written survey that you may receive in the mail after your visit with us. Thank you!             Your Updated Medication List - Protect others around you: Learn how to safely use, store and throw away your medicines at www.disposemymeds.org.          This list is accurate as of 10/22/18 11:59 PM.  Always use your most recent med list.                   Brand Name Dispense Instructions for use Diagnosis    calcium carbonate 600 mg-vitamin D 400 units 600-400 MG-UNIT per tablet    CALTRATE    60 tablet    Take 1 tablet by mouth daily    Age-related osteoporosis with current pathological fracture, initial encounter       nystatin cream    MYCOSTATIN    30 g    Apply topically 3 times daily for 14 days Apply to the affected perineal  Area.    Rash and nonspecific skin eruption       simethicone 40 MG/0.6ML suspension    MYLICON     Take  0.6 mLs (40 mg) by mouth every 6 hours as needed for cramping    Malignant neoplasm of anal canal (H), Flatulence, eructation and gas pain       TYLENOL PO           vitamin B complex with vitamin C Tabs tablet      Take 1 tablet by mouth daily

## 2018-10-25 NOTE — PROGRESS NOTES
Department of Radiation Oncology  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 Completed 3/23/2018.    SUBJECTIVE:   Elizabeth Taylor is a 78 year old female who was diagnosed with a locally advanced squamous cell carcinoma of the anal canal after presenting with progressive anal pain and small amounts of bright red blood per rectum. Staging evaluation revealed a 2.5 cm mass located approximately 1 cm from the anal verge with involvement of the internal anal sphincter in addition to 2 FDG-avid left inguinal nodes.    Ms. Taylor underwent treatment with definitive chemoradiotherapy with curative intent. The primary tumor was treated to a total dose of 54 Gy while the node-positive left groin received 50.4 Gy and the right groin and pelvis was treated to 45 Gy in 30 daily fractions using a simultaneous integrated boost technique. Radiotherapy was delivered with concurrent Mitomycin-C and infusional 5-FU with Mitomycin held with her second cycle of therapy due to concern for treatment-related toxicities given her age and borderline functional status. Ms. Taylor tolerated the initiation of therapy relatively well although approximately midway through her treatment course developing worsening diarrhea, eventually requiring inpatient admission during her final week of radiation.     Since her prior follow-up visit, Ms. Taylor presented with right hip and leg swelling on 2018 after having suffered a  mechanical fall approximately 2 weeks prior. Workup revealed a right femoral neck fracture and she underwent a right hip hemiarthroplasty on 7/26/2018. Her follow-up with Dr. Rosas of colorectal surgery scheduled for 7/30/2018 was canceled while she was inpatient for surgery.  She did see Dr. Rosas on 9/16/18 with plan for MR in 6 months and anoscopy.  She does have another follow up on 11/26/18.    She is now staying with a friend and alternating with her sister.  She states her bowel movements are fairly regular.  She has slight urgency, but has trained her self to go to the bathroom after meals and that has helped.  She denies any blood.  She has not been using her dilator ever.  She did attempt to insert a finger and noticed it was tight, but she could insert it.  She is not sure where she placed her dilator.  She has had some yeast infections that have caused some redness and irritation of the labia.  She has a home care nurse that has been taking care of that.    PHYSICAL EXAM:  /74  Pulse 78  Wt 53 kg (116 lb 12.8 oz)  LMP  (LMP Unknown)  BMI 18.86 kg/m2  Gen: Mildly fatigued-appearing but in no acute distress. She is using a walker and moves very slowly.  Eyes: PERRL, EOMI  Pulm: No wheezing, stridor or respiratory distress  CV: Well-perfused, no cyanosis,   Rectal: External rectal exam showed well healed skin. BRITTNEY was done and was slightly uncomfortable for patient intially, but going very slowly she seemed to tolerate it.  Exam was normal.   Skin:  Hypopigmentation/scarring within the bilateral groin (left > right). No areas of residual desquamation or ulceration.    LABS AND IMAGING:  Reviewed.  No new imaging.     IMPRESSION:   Ms. Taylor is a 78 year old female with a previous cT2 N1a M0 squamous cell carcinoma of the anal canal status post definitive chemoradiotherapy. She continues to make a slow recovery from her prior treatment-related toxicities and associated post-treatment medical  issues (right femoral neck fracture and recent C diff infection). There is no clinical evidence, albeit on limited exam, concerning for progressive/recurrent disease.      PLAN:     1. Continued follow-up with medical oncology as scheduled on 11/27/18.  2. Follow-up with Dr. Rosas of colorectal surgery for ongoing disease surveillance scheduled on 11/26/18.  3. Once again discussed routine vaginal dilator use to prevent stenosis of the vaginal introitus.  She was given a new dilator and instructions today.       Joanie Gomez NP  Dept of Radiation Oncology  [facility]

## 2018-10-29 ENCOUNTER — TELEPHONE (OUTPATIENT)
Dept: SURGERY | Facility: CLINIC | Age: 78
End: 2018-10-29

## 2018-10-29 ENCOUNTER — TELEPHONE (OUTPATIENT)
Dept: RADIATION ONCOLOGY | Facility: CLINIC | Age: 78
End: 2018-10-29

## 2018-10-29 DIAGNOSIS — C21.1 MALIGNANT NEOPLASM OF ANAL CANAL (H): Primary | ICD-10-CM

## 2018-10-29 RX ORDER — TRAMADOL HYDROCHLORIDE 50 MG/1
50 TABLET ORAL EVERY 6 HOURS PRN
Qty: 30 TABLET | Refills: 0 | Status: ON HOLD | OUTPATIENT
Start: 2018-10-29 | End: 2018-11-05

## 2018-10-29 NOTE — TELEPHONE ENCOUNTER
[facility] Call Center    Phone Message    May a detailed message be left on voicemail: yes    Reason for Call: Other: [facility] nurse is wanting to speak with Katherine Humphries regarding patient's symptoms. She stated patient is in a lot of pain and she had a break down where she was screaming at the nurse. She stated in the past they have given her Tramadol and it helped with the pain last time she is wondering if they can give her an RX for this. Lani was not sure if she should start with her PCP or if  would be able to give them this RX. Please follow up with Lani as soon as possible. Thank you.    Action Taken: Message routed to:  [facility] ([facility]): Colon and rectal

## 2018-10-29 NOTE — TELEPHONE ENCOUNTER
Patient (on speaker with Nurse Lani) called complaining of left hip pain.  It was severe over the weekend.  No recent injury (fell on it earlier in the month and had a negative x-ray).  She was very active last week and pain seemed to start after that.  She feels she over did it.  It is feeling slightly better today than this weekend.    Patient requesting Tramadol.  Patient will  script today to take to local pharmacy.  If pain not improved in the next couple of days she should see primary care or medical oncology.    Joanie Gomez, CHAYITO  Radiation Oncology

## 2018-10-31 ENCOUNTER — TELEPHONE (OUTPATIENT)
Dept: RADIATION ONCOLOGY | Facility: CLINIC | Age: 78
End: 2018-10-31

## 2018-11-01 ENCOUNTER — APPOINTMENT (OUTPATIENT)
Dept: GENERAL RADIOLOGY | Facility: CLINIC | Age: 78
DRG: 543 | End: 2018-11-01
Attending: EMERGENCY MEDICINE
Payer: MEDICARE

## 2018-11-01 ENCOUNTER — HOSPITAL ENCOUNTER (INPATIENT)
Facility: CLINIC | Age: 78
LOS: 4 days | Discharge: NURSING, REHAB OR LONG-TERM CARE | DRG: 543 | End: 2018-11-05
Attending: EMERGENCY MEDICINE | Admitting: FAMILY MEDICINE
Payer: MEDICARE

## 2018-11-01 ENCOUNTER — APPOINTMENT (OUTPATIENT)
Dept: CT IMAGING | Facility: CLINIC | Age: 78
DRG: 543 | End: 2018-11-01
Attending: EMERGENCY MEDICINE
Payer: MEDICARE

## 2018-11-01 DIAGNOSIS — K59.03 DRUG-INDUCED CONSTIPATION: ICD-10-CM

## 2018-11-01 DIAGNOSIS — M80.00XA AGE-RELATED OSTEOPOROSIS WITH CURRENT PATHOLOGICAL FRACTURE, INITIAL ENCOUNTER: Primary | ICD-10-CM

## 2018-11-01 DIAGNOSIS — C21.1 MALIGNANT NEOPLASM OF ANAL CANAL (H): ICD-10-CM

## 2018-11-01 DIAGNOSIS — M84.454A PATHOLOGICAL FRACTURE OF PELVIS, UNSPECIFIED PATHOLOGICAL CAUSE, INITIAL ENCOUNTER: ICD-10-CM

## 2018-11-01 PROBLEM — S72.009A HIP FRACTURE (H): Status: ACTIVE | Noted: 2018-11-01

## 2018-11-01 LAB
ANION GAP SERPL CALCULATED.3IONS-SCNC: 6 MMOL/L (ref 3–14)
BASOPHILS # BLD AUTO: 0 10E9/L (ref 0–0.2)
BASOPHILS NFR BLD AUTO: 0.4 %
BUN SERPL-MCNC: 15 MG/DL (ref 7–30)
CALCIUM SERPL-MCNC: 8.9 MG/DL (ref 8.5–10.1)
CHLORIDE SERPL-SCNC: 103 MMOL/L (ref 94–109)
CO2 SERPL-SCNC: 30 MMOL/L (ref 20–32)
CREAT SERPL-MCNC: 0.48 MG/DL (ref 0.52–1.04)
CRP SERPL-MCNC: 3 MG/L (ref 0–8)
DIFFERENTIAL METHOD BLD: ABNORMAL
EOSINOPHIL # BLD AUTO: 0.1 10E9/L (ref 0–0.7)
EOSINOPHIL NFR BLD AUTO: 0.9 %
ERYTHROCYTE [DISTWIDTH] IN BLOOD BY AUTOMATED COUNT: 16.3 % (ref 10–15)
ERYTHROCYTE [SEDIMENTATION RATE] IN BLOOD BY WESTERGREN METHOD: 16 MM/H (ref 0–30)
GFR SERPL CREATININE-BSD FRML MDRD: >90 ML/MIN/1.7M2
GLUCOSE SERPL-MCNC: 80 MG/DL (ref 70–99)
HCT VFR BLD AUTO: 36.4 % (ref 35–47)
HGB BLD-MCNC: 11.2 G/DL (ref 11.7–15.7)
IMM GRANULOCYTES # BLD: 0 10E9/L (ref 0–0.4)
IMM GRANULOCYTES NFR BLD: 0.2 %
LYMPHOCYTES # BLD AUTO: 0.8 10E9/L (ref 0.8–5.3)
LYMPHOCYTES NFR BLD AUTO: 14.6 %
MCH RBC QN AUTO: 27.5 PG (ref 26.5–33)
MCHC RBC AUTO-ENTMCNC: 30.8 G/DL (ref 31.5–36.5)
MCV RBC AUTO: 89 FL (ref 78–100)
MONOCYTES # BLD AUTO: 0.3 10E9/L (ref 0–1.3)
MONOCYTES NFR BLD AUTO: 6.4 %
NEUTROPHILS # BLD AUTO: 4.1 10E9/L (ref 1.6–8.3)
NEUTROPHILS NFR BLD AUTO: 77.5 %
NRBC # BLD AUTO: 0 10*3/UL
NRBC BLD AUTO-RTO: 0 /100
PLATELET # BLD AUTO: 226 10E9/L (ref 150–450)
POTASSIUM SERPL-SCNC: 3.9 MMOL/L (ref 3.4–5.3)
RBC # BLD AUTO: 4.08 10E12/L (ref 3.8–5.2)
SODIUM SERPL-SCNC: 139 MMOL/L (ref 133–144)
WBC # BLD AUTO: 5.3 10E9/L (ref 4–11)

## 2018-11-01 PROCEDURE — 72192 CT PELVIS W/O DYE: CPT

## 2018-11-01 PROCEDURE — 85652 RBC SED RATE AUTOMATED: CPT | Performed by: EMERGENCY MEDICINE

## 2018-11-01 PROCEDURE — 99284 EMERGENCY DEPT VISIT MOD MDM: CPT | Mod: Z6 | Performed by: EMERGENCY MEDICINE

## 2018-11-01 PROCEDURE — 12000008 ZZH R&B INTERMEDIATE UMMC

## 2018-11-01 PROCEDURE — A9270 NON-COVERED ITEM OR SERVICE: HCPCS | Mod: GY | Performed by: STUDENT IN AN ORGANIZED HEALTH CARE EDUCATION/TRAINING PROGRAM

## 2018-11-01 PROCEDURE — 73502 X-RAY EXAM HIP UNI 2-3 VIEWS: CPT

## 2018-11-01 PROCEDURE — 80048 BASIC METABOLIC PNL TOTAL CA: CPT | Performed by: EMERGENCY MEDICINE

## 2018-11-01 PROCEDURE — 25000128 H RX IP 250 OP 636: Performed by: STUDENT IN AN ORGANIZED HEALTH CARE EDUCATION/TRAINING PROGRAM

## 2018-11-01 PROCEDURE — 25000132 ZZH RX MED GY IP 250 OP 250 PS 637: Mod: GY | Performed by: STUDENT IN AN ORGANIZED HEALTH CARE EDUCATION/TRAINING PROGRAM

## 2018-11-01 PROCEDURE — 85025 COMPLETE CBC W/AUTO DIFF WBC: CPT | Performed by: EMERGENCY MEDICINE

## 2018-11-01 PROCEDURE — 99285 EMERGENCY DEPT VISIT HI MDM: CPT | Mod: 25 | Performed by: EMERGENCY MEDICINE

## 2018-11-01 PROCEDURE — 86140 C-REACTIVE PROTEIN: CPT | Performed by: EMERGENCY MEDICINE

## 2018-11-01 RX ORDER — AMOXICILLIN 250 MG
2 CAPSULE ORAL 2 TIMES DAILY PRN
Status: DISCONTINUED | OUTPATIENT
Start: 2018-11-01 | End: 2018-11-05 | Stop reason: HOSPADM

## 2018-11-01 RX ORDER — SIMETHICONE 40MG/0.6ML
40 SUSPENSION, DROPS(FINAL DOSAGE FORM)(ML) ORAL EVERY 6 HOURS PRN
Status: DISCONTINUED | OUTPATIENT
Start: 2018-11-01 | End: 2018-11-05 | Stop reason: HOSPADM

## 2018-11-01 RX ORDER — ACETAMINOPHEN 500 MG
1000 TABLET ORAL 3 TIMES DAILY
Status: DISCONTINUED | OUTPATIENT
Start: 2018-11-02 | End: 2018-11-05 | Stop reason: HOSPADM

## 2018-11-01 RX ORDER — NALOXONE HYDROCHLORIDE 0.4 MG/ML
.1-.4 INJECTION, SOLUTION INTRAMUSCULAR; INTRAVENOUS; SUBCUTANEOUS
Status: DISCONTINUED | OUTPATIENT
Start: 2018-11-01 | End: 2018-11-05 | Stop reason: HOSPADM

## 2018-11-01 RX ORDER — ACETAMINOPHEN 500 MG
1000 TABLET ORAL 3 TIMES DAILY
COMMUNITY

## 2018-11-01 RX ORDER — TRAMADOL HYDROCHLORIDE 50 MG/1
50 TABLET ORAL EVERY 4 HOURS PRN
Status: DISCONTINUED | OUTPATIENT
Start: 2018-11-01 | End: 2018-11-02

## 2018-11-01 RX ORDER — ONDANSETRON 2 MG/ML
4 INJECTION INTRAMUSCULAR; INTRAVENOUS EVERY 6 HOURS PRN
Status: DISCONTINUED | OUTPATIENT
Start: 2018-11-01 | End: 2018-11-05 | Stop reason: HOSPADM

## 2018-11-01 RX ORDER — FERROUS SULFATE 325(65) MG
325 TABLET ORAL
Status: DISCONTINUED | OUTPATIENT
Start: 2018-11-02 | End: 2018-11-05 | Stop reason: HOSPADM

## 2018-11-01 RX ORDER — BISACODYL 10 MG
10 SUPPOSITORY, RECTAL RECTAL DAILY PRN
Status: DISCONTINUED | OUTPATIENT
Start: 2018-11-01 | End: 2018-11-05 | Stop reason: HOSPADM

## 2018-11-01 RX ORDER — PNV NO.95/FERROUS FUM/FOLIC AC 28MG-0.8MG
1 TABLET ORAL DAILY
COMMUNITY

## 2018-11-01 RX ORDER — POLYETHYLENE GLYCOL 3350 17 G/17G
17 POWDER, FOR SOLUTION ORAL DAILY PRN
Status: DISCONTINUED | OUTPATIENT
Start: 2018-11-01 | End: 2018-11-05 | Stop reason: HOSPADM

## 2018-11-01 RX ORDER — AMOXICILLIN 250 MG
1 CAPSULE ORAL 2 TIMES DAILY PRN
Status: DISCONTINUED | OUTPATIENT
Start: 2018-11-01 | End: 2018-11-05 | Stop reason: HOSPADM

## 2018-11-01 RX ORDER — FERROUS SULFATE 325(65) MG
325 TABLET ORAL
COMMUNITY
End: 2019-02-07

## 2018-11-01 RX ORDER — ONDANSETRON 4 MG/1
4 TABLET, ORALLY DISINTEGRATING ORAL EVERY 6 HOURS PRN
Status: DISCONTINUED | OUTPATIENT
Start: 2018-11-01 | End: 2018-11-05 | Stop reason: HOSPADM

## 2018-11-01 RX ADMIN — TRAMADOL HYDROCHLORIDE 50 MG: 50 TABLET, COATED ORAL at 23:12

## 2018-11-01 RX ADMIN — ENOXAPARIN SODIUM 40 MG: 40 INJECTION SUBCUTANEOUS at 23:12

## 2018-11-01 ASSESSMENT — ACTIVITIES OF DAILY LIVING (ADL)
BATHING: 0-->INDEPENDENT
COGNITION: 0 - NO COGNITION ISSUES REPORTED
NUMBER_OF_TIMES_PATIENT_HAS_FALLEN_WITHIN_LAST_SIX_MONTHS: 4
TOILETING: 0-->INDEPENDENT
DRESS: 0-->INDEPENDENT
WHICH_OF_THE_ABOVE_FUNCTIONAL_RISKS_HAD_A_RECENT_ONSET_OR_CHANGE?: FALL HISTORY
FALL_HISTORY_WITHIN_LAST_SIX_MONTHS: YES
AMBULATION: 1-->ASSISTIVE EQUIPMENT
RETIRED_EATING: 0-->INDEPENDENT
RETIRED_COMMUNICATION: 0-->UNDERSTANDS/COMMUNICATES WITHOUT DIFFICULTY
SWALLOWING: 0-->SWALLOWS FOODS/LIQUIDS WITHOUT DIFFICULTY
TRANSFERRING: 1-->ASSISTIVE EQUIPMENT

## 2018-11-01 ASSESSMENT — ENCOUNTER SYMPTOMS
HEADACHES: 0
COLOR CHANGE: 0
EYE REDNESS: 0
ABDOMINAL PAIN: 0
DIFFICULTY URINATING: 0
NECK STIFFNESS: 0
ARTHRALGIAS: 0
CONFUSION: 0
SHORTNESS OF BREATH: 0
FEVER: 0

## 2018-11-01 NOTE — IP AVS SNAPSHOT
[address] [address]    Phone:  [phone]                                       After Visit Summary   11/1/2018    Elizabeth Taylor    MRN: [id number]           After Visit Summary Signature Page     I have received my discharge instructions, and my questions have been answered. I have discussed any challenges I see with this plan with the nurse or doctor.    ..........................................................................................................................................  Patient/Patient Representative Signature      ..........................................................................................................................................  Patient Representative Print Name and Relationship to Patient    ..................................................               ................................................  Date                                   Time    ..........................................................................................................................................  Reviewed by Signature/Title    ...................................................              ..............................................  Date                                               Time          22EPIC Rev 08/18

## 2018-11-01 NOTE — IP AVS SNAPSHOT
MRN:[id number]                      After Visit Summary   11/1/2018    Elizabeth Taylor    MRN: [id number]           Thank you!     Thank you for choosing [facility] for your care. Our goal is always to provide you with excellent care. Hearing back from our patients is one way we can continue to improve our services. Please take a few minutes to complete the written survey that you may receive in the mail after you visit with us. Thank you!        Patient Information     Date Of Birth          1940        About your hospital stay     You were admitted on:  November 1, 2018 You last received care in the:  [address] [facility]    You were discharged on:  November 5, 2018        Reason for your hospital stay       Pelvis fracture and pain management                  Who to Call     For medical emergencies, please call 911.  For non-urgent questions about your medical care, please call your primary care provider or clinic, [phone]          Attending Provider     Provider Specialty    Doni Miller MD Emergency Medicine    Brenda, Aakash Worley MD Emergency Medicine    Jeevan, Pankaj Murillo MD Family Medicine - Sports Medicine       Primary Care Provider Office Phone # Fax #    Baron Dread Seth [phone]      After Care Instructions     Activity - Up with nursing assistance           Advance Diet as Tolerated       Follow this diet upon discharge: Orders Placed This Encounter      Regular Diet Adult            Encourage PO fluids           Fall precautions           General info for SNF       Length of Stay Estimate: Short Term Care: Estimated # of Days <30  Condition at Discharge: Improving  Level of care:skilled   Rehabilitation Potential: Good  Admission H&P remains valid and up-to-date: Yes  Recent Chemotherapy: N/A  Use Nursing Home Standing Orders: Yes            Intake and output       Every shift            Mantoux instructions       Give two-step  Mantoux (PPD) Per Facility Policy Yes            Weight bearing status       Able to bear weight in both lower extremities.                  Follow-up Appointments     Follow Up ([facility])       Follow up with primary care provider, Baron Seth, in one month for hospital follow- up and to follow up on results.  The following labs/tests are recommended: Please follow up DEXA results with patient. Recommend initiating Forteo 20 mcg subcutaneous daily for two years and then repeat DEXA, monitor Calcium and Phosphorus every three months while on Forteo. This will require prior authorization which we recommend pursuing. May consider bisphosphonate therapy if Forteo not a viable option. Also recommend hemoglobin, iron studies at post hospital visit. Please also follow up additional inpatient osteoporosis workup done during hospitalization.     Appointments on [place] and/or [facility] (with [facility] or [facility] provider or service). Call [phone] if you haven't heard regarding these appointments within 7 days of discharge.                  Your next 10 appointments already scheduled     Nov 16, 2018  2:00 PM CST   (Arrive by 1:30 PM)   RETURN SPINE with Zaire Phan MD   [facility] ([facility])    [address] [place] [address]  [address] [facility] [address]   [phone]            Nov 26, 2018 11:00 AM CST   (Arrive by 10:45 AM)   Return Visit with Emir Rosas MD   [facility] ([facility])    [address] [place] [address]  [address] [facility] [address]   [phone]            Nov 27, 2018  8:00 AM CST   [facility] Draw with  [facility] LAB DRAW   [facility] Draw ([facility])    [address] [place] [address]  [address]  [facility] [address]   [phone]            Nov 27, 2018  8:20 AM CST   (Arrive by 8:05 AM)   Return Visit with HAIDER Avina   [facility]  [facility] ([facility])    [address] [facility]  [address]  [facility] [address]   [phone]            Nov 27, 2018 10:00 AM CST   (Arrive by 9:30 AM)   RETURN SPINE with Zaire Phan MD   [facility] ([facility])    [address] [place] [address]  4th Floor  [facility] [address]   [phone]            Feb 22, 2019  9:00 AM CST   M[facility]ic Lab Draw with  MASONIC LAB DRAW   [facility] Lab Draw ([facility])    [address] [facility] [address]  [address]  [facility] [address]   [phone]            Feb 22, 2019  9:40 AM CST   CT ABDOMEN PELVIS W CONTRAST with UCCT1   [facility] CT ([facility])    [address] [place] [address]  1st Floor  [facility] [address]   [phone]           How do I prepare for my exam? (Food and drink instructions) To prepare: Do not eat or drink for 2 hours before your exam. If you need to take medicine, you may take it with small sips of water. (We may ask you to take liquid medicine as well.)  How do I prepare for my exam? (Other instructions) Please arrive 30 minutes early for your CT.  Once in the department you might be asked to drink water 15-20 minutes prior to your exam.  If indicated you may be asked to drink an oral contrast in advance of your CT.  If this is the case, the imaging team will let you know or be in contact with you prior to your appointment  Patients over 70 or patients with diabetes or kidney problems: If you haven t had a blood test (creatinine test) within the last 30 days, the Cardiologist/Radiologist may require you to get this test prior to your exam.  If you have diabetes:  Continue to take your metformin medication on the day of your exam  What should I wear: Please wear loose clothing, such as a sweat suit or jogging clothes. Avoid snaps, zippers and other metal. We may ask you to undress and put on a  [facility].  How long does the exam take: Most scans take less than 20 minutes.  What should I bring: Please bring any scans or X-rays taken at other hospitals, if similar tests were done. Also bring a list of your medicines, including vitamins, minerals and over-the-counter drugs. It is safest to leave personal items at home.  Do I need a : No  is needed.  What do I need to tell my doctor? Be sure to tell your doctor: * If you have any allergies. * If there s any chance you are pregnant. * If you are breastfeeding.  What should I do after the exam: No restrictions, You may resume normal activities.  What is this test: A CT (computed tomography) scan is a series of pictures that allows us to look inside your body. The scanner creates images of the body in cross sections, much like slices of bread. This helps us see any problems more clearly. You may receive contrast (X-ray dye) before or during your scan. You will be asked to drink the contrast.  Who should I call with questions: If you have any questions, please call the Imaging Department where you will have your exam. Directions, parking instructions, and other information is available on our website, [email or web address].            Feb 22, 2019 12:30 PM CST   (Arrive by 12:15 PM)   Return Visit with Shen Ray MD   [facility] ([facility])    [address]  [address]  [facility] [address] [phone]            Feb 22, 2019  2:00 PM CST   Return Visit with Zaire Madison MD   [facility] ([facility])    [facility]  1st Floor  [address] [facility] [facility] [address]   [phone]              Future tests that were ordered for you     DX Body Composition           Pneumatic Compression Device        Bilateral calf. Remove 30 mins BID.                  Pending Results     Date and Time Order Name Status Description     "11/5/2018 0100 Bone specific alk phosphatase In process     11/4/2018 0135 Protein electrophoresis timed urine In process     11/3/2018 0904 N-Telopeptide Cross-Linked Serum In process     11/3/2018 0904 Protein electrophoresis In process             Admission Information     Date & Time Provider Department Dept. Phone    11/1/2018 Pankaj Chew MD [address] [facility] [phone]      Your Vitals Were     Blood Pressure Pulse Temperature Respirations Height Weight    117/61 (BP Location: Right arm) 68 97.7  F (36.5  C) (Oral) 16 1.626 m (5' 4\") 54.4 kg (120 lb)    Last Period Pulse Oximetry BMI (Body Mass Index)             (LMP Unknown) 95% 20.6 kg/m2         MyChart Information     "[employer] gives you secure access to your electronic health record. If you see a primary care provider, you can also send messages to your care team and make appointments. If you have questions, please call your primary care clinic.  If you do not have a primary care provider, please call [phone] and they will assist you.        Care EveryWhere ID     This is your Care EveryWhere ID. This could be used by other organizations to access your [facility] medical records  [id number]        Equal Access to Services     IONA DALEY : Josué Grubbs, waanada sonu, qaybta kaalmada adedave, cyndy catalan. So [facility] [phone].    ATENCIÓN: Si habla español, tiene a ghosh disposición servicios gratuitos de asistencia lingüística. Llame al [phone].    We comply with applicable federal civil rights laws and [place] laws. We do not discriminate on the basis of race, color, national origin, age, disability, sex, sexual orientation, or gender identity.               Review of your medicines      START taking        Dose / Directions    bisacodyl 10 MG Suppository   Commonly known as:  DULCOLAX   Used for:  Drug-induced constipation        Dose:  10 mg   Place 1 suppository (10 mg) " rectally daily as needed for constipation   Quantity:  30 suppository   Refills:  0       cholecalciferol 5000 units Caps   Used for:  Age-related osteoporosis with current pathological fracture, initial encounter, Pathological fracture of pelvis, unspecified pathological cause, initial encounter        Dose:  5000 Units   Start taking on:  11/6/2018   Take 1 capsule (5,000 Units) by mouth daily   Refills:  0       docusate sodium 100 MG capsule   Commonly known as:  COLACE   Used for:  Drug-induced constipation        Dose:  100 mg   Take 1 capsule (100 mg) by mouth 2 times daily   Quantity:  60 capsule   Refills:  0       Lidocaine 4 % Patch   Commonly known as:  LIDOCARE   Used for:  Pathological fracture of pelvis, unspecified pathological cause, initial encounter        Dose:  2 patch   Place 2 patches onto the skin every 24 hours   Refills:  0       methocarbamol 500 MG tablet   Commonly known as:  ROBAXIN   Used for:  Pathological fracture of pelvis, unspecified pathological cause, initial encounter        Dose:  500 mg   Take 1 tablet (500 mg) by mouth every 6 hours as needed for muscle spasms   Quantity:  120 tablet   Refills:  0       oxyCODONE IR 5 MG tablet   Commonly known as:  ROXICODONE   Used for:  Pathological fracture of pelvis, unspecified pathological cause, initial encounter        Dose:  2.5 mg   Take 0.5 tablets (2.5 mg) by mouth every 4 hours as needed for moderate to severe pain   Quantity:  10 tablet   Refills:  0       polyethylene glycol Packet   Commonly known as:  MIRALAX/GLYCOLAX   Used for:  Drug-induced constipation        Dose:  17 g   Take 17 g by mouth daily as needed for constipation   Quantity:  7 packet   Refills:  0         CONTINUE these medicines which have NOT CHANGED        Dose / Directions    acetaminophen 500 MG tablet   Commonly known as:  TYLENOL        Dose:  1000 mg   Take 1,000 mg by mouth 3 times daily   Refills:  0       calcium carbonate 600 mg-vitamin D 400  units 600-400 MG-UNIT per tablet   Commonly known as:  CALTRATE   Used for:  Age-related osteoporosis with current pathological fracture, initial encounter        Dose:  1 tablet   Take 1 tablet by mouth daily   Quantity:  60 tablet   Refills:  0       ferrous sulfate 325 (65 Fe) MG tablet   Commonly known as:  IRON        Dose:  325 mg   Take 325 mg by mouth daily (with breakfast)   Refills:  0       simethicone 40 MG/0.6ML suspension   Commonly known as:  MYLICON   Used for:  Malignant neoplasm of anal canal (H), Flatulence, eructation and gas pain        Dose:  40 mg   Take 0.6 mLs (40 mg) by mouth every 6 hours as needed for cramping   Refills:  0       traMADol 50 MG tablet   Commonly known as:  ULTRAM   Used for:  Malignant neoplasm of anal canal (H)        Dose:  50 mg   Take 1 tablet (50 mg) by mouth every 6 hours as needed for severe pain   Quantity:  30 tablet   Refills:  0       vitamin B complex with vitamin C Tabs tablet        Dose:  1 tablet   Take 1 tablet by mouth daily   Refills:  0       VITAMIN E PO        Dose:  1 capsule   Take 1 capsule by mouth daily   Refills:  0            Where to get your medicines      Some of these will need a paper prescription and others can be bought over the counter. Ask your nurse if you have questions.     You don't need a prescription for these medications     bisacodyl 10 MG Suppository    cholecalciferol 5000 units Caps    docusate sodium 100 MG capsule    Lidocaine 4 % Patch    methocarbamol 500 MG tablet    polyethylene glycol Packet         Information about where to get these medications is not yet available     ! Ask your nurse or doctor about these medications     oxyCODONE IR 5 MG tablet    traMADol 50 MG tablet                Protect others around you: Learn how to safely use, store and throw away your medicines at www.disposemymeds.org.        Information about OPIOIDS     PRESCRIPTION OPIOIDS: WHAT YOU NEED TO KNOW   We gave you an opioid (narcotic)  pain medicine. It is important to manage your pain, but opioids are not always the best choice. You should first try all the other options your care team gave you. Take this medicine for as short a time (and as few doses) as possible.    Some activities can increase your pain, such as bandage changes or therapy sessions. It may help to take your pain medicine 30 to 60 minutes before these activities. Reduce your stress by getting enough sleep, working on hobbies you enjoy and practicing relaxation or meditation. Talk to your care team about ways to manage your pain beyond prescription opioids.    These medicines have risks:    DO NOT drive when on new or higher doses of pain medicine. These medicines can affect your alertness and reaction times, and you could be arrested for driving under the influence (DUI). If you need to use opioids long-term, talk to your care team about driving.    DO NOT operate heavy machinery    DO NOT do any other dangerous activities while taking these medicines.    DO NOT drink any alcohol while taking these medicines.     If the opioid prescribed includes acetaminophen, DO NOT take with any other medicines that contain acetaminophen. Read all labels carefully. Look for the word  acetaminophen  or  Tylenol.  Ask your pharmacist if you have questions or are unsure.    You can get addicted to pain medicines, especially if you have a history of addiction (chemical, alcohol or substance dependence). Talk to your care team about ways to reduce this risk.    All opioids tend to cause constipation. Drink plenty of water and eat foods that have a lot of fiber, such as fruits, vegetables, prune juice, apple juice and high-fiber cereal. Take a laxative (Miralax, milk of magnesia, Colace, Senna) if you don t move your bowels at least every other day. Other side effects include upset stomach, sleepiness, dizziness, throwing up, tolerance (needing more of the medicine to have the same effect),  physical dependence and slowed breathing.    Store your pills in a secure place, locked if possible. We will not replace any lost or stolen medicine. If you don t finish your medicine, please throw away (dispose) as directed by your pharmacist. The [place] Pollution [employer] has more information about safe disposal: https://www.pca.[facility].mn.us/living-green/managing-unwanted-medications             Medication List: This is a list of all your medications and when to take them. Check marks below indicate your daily home schedule. Keep this list as a reference.      Medications           Morning Afternoon Evening Bedtime As Needed    acetaminophen 500 MG tablet   Commonly known as:  TYLENOL   Take 1,000 mg by mouth 3 times daily   Last time this was given:  1,000 mg on 11/5/2018  7:29 AM                                bisacodyl 10 MG Suppository   Commonly known as:  DULCOLAX   Place 1 suppository (10 mg) rectally daily as needed for constipation                                calcium carbonate 600 mg-vitamin D 400 units 600-400 MG-UNIT per tablet   Commonly known as:  CALTRATE   Take 1 tablet by mouth daily                                cholecalciferol 5000 units Caps   Take 1 capsule (5,000 Units) by mouth daily   Start taking on:  11/6/2018   Last time this was given:  5,000 Units on 11/5/2018  7:29 AM                                docusate sodium 100 MG capsule   Commonly known as:  COLACE   Take 1 capsule (100 mg) by mouth 2 times daily   Last time this was given:  100 mg on 11/5/2018  7:29 AM                                ferrous sulfate 325 (65 Fe) MG tablet   Commonly known as:  IRON   Take 325 mg by mouth daily (with breakfast)   Last time this was given:  325 mg on 11/5/2018  7:29 AM                                Lidocaine 4 % Patch   Commonly known as:  LIDOCARE   Place 2 patches onto the skin every 24 hours   Last time this was given:  2 patches on 11/4/2018  8:09 AM                                 methocarbamol 500 MG tablet   Commonly known as:  ROBAXIN   Take 1 tablet (500 mg) by mouth every 6 hours as needed for muscle spasms   Last time this was given:  500 mg on 11/5/2018  7:29 AM                                oxyCODONE IR 5 MG tablet   Commonly known as:  ROXICODONE   Take 0.5 tablets (2.5 mg) by mouth every 4 hours as needed for moderate to severe pain   Last time this was given:  2.5 mg on 11/5/2018 10:18 AM                                polyethylene glycol Packet   Commonly known as:  MIRALAX/GLYCOLAX   Take 17 g by mouth daily as needed for constipation   Last time this was given:  17 g on 11/2/2018 10:56 AM                                simethicone 40 MG/0.6ML suspension   Commonly known as:  MYLICON   Take 0.6 mLs (40 mg) by mouth every 6 hours as needed for cramping                                traMADol 50 MG tablet   Commonly known as:  ULTRAM   Take 1 tablet (50 mg) by mouth every 6 hours as needed for severe pain   Last time this was given:  50 mg on 11/2/2018  8:30 AM                                vitamin B complex with vitamin C Tabs tablet   Take 1 tablet by mouth daily                                VITAMIN E PO   Take 1 capsule by mouth daily

## 2018-11-01 NOTE — ED PROVIDER NOTES
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  The patient states that 8 days ago she developed left hip pain with walking.  She reports that her pain then progressively worsened over the next 8 days and today her pain has become unbearable.  She is currently living with her sister and brother-in-law following her right hip surgery and they were able to help her to the car to bring her to the ED. the only trauma she remembers is having a spasm in her leg and kicking it out straight while on the commode 2 days ago, hitting her heel into the wall.  Here, the patient is unable to walk on her left hip whatsoever and required the assistance of 3 people to transfer the patient into her bed.  While lying still, the patient denies having any pain.  The patient states that her pain is exacerbated with weightbearing and movement of her left hip.  The patient denies any increase in swelling in her legs, denies fevers.  The patient was seen by her at home nurse who gave her a prescription for tramadol which did help improve her pain.  The patient denies a history of DVT.  She denies a history of left hip surgeries.  The patient denies having any right hip pain.    I have reviewed the Medications, Allergies, Past Medical and Surgical History, and Social History in the [facility] system.    Past Medical History:   Diagnosis Date     Anal cancer (H)      Endometriosis, site unspecified      Thyroiditis, unspecified     Thryoiditis-resolved     Past Surgical History:   Procedure Laterality Date     APPENDECTOMY      as a child     ARTHROPLASTY HIP Right 7/26/2018    Procedure: ARTHROPLASTY HIP;  Right Hip  "Hemiarthroplasty;  Surgeon: Zaire Phan MD;  Location: [facility] OR     COLONOSCOPY N/A 4/13/2017    Procedure: COLONOSCOPY;  Surgeon: Juan Dos Santos MD;  Location: [facility] GI     INSERT PORT VASCULAR ACCESS Right 2/8/2018    Procedure: INSERT PORT VASCULAR ACCESS;  Place Single Lumen Venous Chest Port Placement;  Surgeon: Zaire Moreira PA-C;  Location: [facility]     SURGICAL HISTORY OF -       endometriosis     Family History   Problem Relation Age of Onset     Cancer Sister      Cancer Maternal Grandmother      lymphoma     HEART DISEASE Father      MI     Uterine Cancer Niece      Social History   Substance Use Topics     Smoking status: Never Smoker     Smokeless tobacco: Never Used     Alcohol use No     No current facility-administered medications for this encounter.      Current Outpatient Prescriptions   Medication     Acetaminophen (TYLENOL PO)     traMADol (ULTRAM) 50 MG tablet     calcium-vitamin D (CALTRATE) 600-400 MG-UNIT per tablet     simethicone (MYLICON) 40 MG/0.6ML suspension     vitamin B complex with vitamin C (VITAMIN  B COMPLEX) TABS tablet     Allergies   Allergen Reactions     Darvon [Propoxyphene]      Had reaction in teen years     Penicillins      As a child     Ketoconazole Rash      Review of Systems   Constitutional: Negative for fever.   HENT: Negative for congestion.    Eyes: Negative for redness.   Respiratory: Negative for shortness of breath.    Cardiovascular: Negative for chest pain.   Gastrointestinal: Negative for abdominal pain.   Genitourinary: Negative for difficulty urinating.   Musculoskeletal: Negative for arthralgias and neck stiffness.        Severe left hip pain   Skin: Negative for color change.   Neurological: Negative for headaches.   Psychiatric/Behavioral: Negative for confusion.     Physical Exam   BP: 131/74  Pulse: 88  Temp: 98.4  F (36.9  C)  Resp: 18  Height: 162.6 cm (5' 4\")  Weight: 54.4 kg (120 lb)  SpO2: 98 %    Physical Exam "   Constitutional: No distress.   HENT:   Head: Atraumatic.   Mouth/Throat: Oropharynx is clear and moist. No oropharyngeal exudate.   Eyes: Pupils are equal, round, and reactive to light. No scleral icterus.   Cardiovascular: Normal heart sounds and intact distal pulses.    Pulmonary/Chest: Breath sounds normal. No respiratory distress.   Abdominal: Soft. Bowel sounds are normal. There is no tenderness.   Musculoskeletal: She exhibits no edema.        Left hip: She exhibits decreased range of motion and tenderness (Over greater trochanter). She exhibits no deformity (No shortening, normal pulse distally).   Skin: Skin is warm. No rash noted. She is not diaphoretic.       ED Course   2:15 PM  The patient was seen and examined by Dr. Miller in Room 21.    ED Course     Procedures             Critical Care time:  none             Labs Ordered and Resulted from Time of ED Arrival Up to the Time of Departure from the ED   CBC WITH PLATELETS DIFFERENTIAL - Abnormal; Notable for the following:        Result Value    Hemoglobin 11.2 (*)     MCHC 30.8 (*)     RDW 16.3 (*)     All other components within normal limits   BASIC METABOLIC PANEL - Abnormal; Notable for the following:     Creatinine 0.48 (*)     All other components within normal limits   ERYTHROCYTE SEDIMENTATION RATE AUTO   CRP INFLAMMATION   PERIPHERAL IV CATHETER            Assessments & Plan (with Medical Decision Making)   The patient has a pathologic fracture of her inferior and superior pubic ramus on the left side.  Her only trauma was hitting her heel into the wall when her leg spasms 2 days ago.  I spoke with Dr. Johnson from trauma who agrees that this patient be medically managed.  I also spoke to the oncology service who confirmed that she is not currently on any chemotherapy.  The radiation therapy that she received may have predisposed her to this injury.  She will be admitted to the internal medicine service with an orthopedic surgery consult.   At this time orthopedics does not think she will require operative management.  She is comfortable when lying still but will need further pain medicine for any transfers or movement.    I have reviewed the nursing notes.    I have reviewed the findings, diagnosis, plan and need for follow up with the patient.    New Prescriptions    No medications on file       Final diagnoses:   Pathological fracture of pelvis, unspecified pathological cause, initial encounter   I, Zaire Pruitt, am serving as a trained medical scribe to document services personally performed by Jasper Miller MD, based on the provider's statements to me.   I, Jasper Miller MD, was physically present and have reviewed and verified the accuracy of this note documented by Zaire Pruitt.     11/1/2018   [facility], [place], EMERGENCY DEPARTMENT     Doni Miller MD  11/01/18 [id number]

## 2018-11-01 NOTE — ED TRIAGE NOTES
Pt comes in with c/o left hip pain after walking over a week ago. She says that she was fine when she went to bed that night but when she woke up the next morning her hip was in excruciating pain. She was still walking with her walker despite the pain but now she says the pain is unbearable. She had a partial hip replacement in July. Pt is not a good historian of events leading up to her extensive pain. She says that she can stand and walk slowly with a walker and has been for the past week but she is yelling out in pain while in triage and the lobby.

## 2018-11-01 NOTE — ED NOTES
[facility], [place]   ED Nurse to Floor Handoff     Elizabeth Taylor is a 78 year old female who speaks English and lives with a spouse,  in a home  They arrived in the ED by car from home    ED Chief Complaint: Hip Pain    ED Dx;   Final diagnoses:   Pathological fracture of pelvis, unspecified pathological cause, initial encounter         Needed?: No    Allergies:   Allergies   Allergen Reactions     Darvon [Propoxyphene]      Had reaction in teen years     Penicillins      As a child     Ketoconazole Rash   .  Past Medical Hx:   Past Medical History:   Diagnosis Date     Anal cancer (H)      Endometriosis, site unspecified      Thyroiditis, unspecified     Thryoiditis-resolved      Baseline Mental status: WDL  Current Mental Status changes: at basesline    Infection present or suspected this encounter: no  Sepsis suspected: No  Isolation type: No active isolations     Activity level - Baseline/Home:  Independent  Activity Level - Current:   pt currently laying in bed, hip fracture, have been using bedpan with assist of 1, pt able to lift hips well    Bariatric equipment needed?: No    In the ED these meds were given: Medications - No data to display    Drips running?  No    Home pump  No    Current LDAs  Port A Cath Single 02/08/18 Right Chest wall (Active)   Access Date 11/01/18 11/1/2018  3:24 PM   Access Attempts 1 11/1/2018  3:24 PM   Gauge/Length 20 gauge;3/4 inch 11/1/2018  3:24 PM   Site Assessment [facility] 11/1/2018  3:24 PM   Line status: Medial or Superior Lumen Blood return noted 11/1/2018  3:24 PM   Number of days:266       Labs results:   Labs Ordered and Resulted from Time of ED Arrival Up to the Time of Departure from the ED   CBC WITH PLATELETS DIFFERENTIAL - Abnormal; Notable for the following:        Result Value    Hemoglobin 11.2 (*)     MCHC 30.8 (*)     RDW 16.3 (*)     All other components within normal limits   BASIC METABOLIC PANEL - Abnormal; Notable  for the following:     Creatinine 0.48 (*)     All other components within normal limits   ERYTHROCYTE SEDIMENTATION RATE AUTO   CRP INFLAMMATION   PERIPHERAL IV CATHETER       Imaging Studies:   Recent Results (from the past 24 hour(s))   XR Pelvis and Hip Left 2 Views    Narrative    Exam: XR PELVIS AND HIP LEFT 2 VIEWS, 11/1/2018 3:18 PM    Indication: severe spasm and left hip pain, no fall.;     Comparison: X-ray 10/16/2018    Findings:   AP view of pelvis and 2 views left hip.    Iliopectineal line on left is discontinuous, suspicious for fracture.    Osteopenic appearance of bone. Right hip arthroplasty. Degenerative  changes of the lumbar spine. Soft tissue unremarkable.      Impression    Impression:   Iliopectineal line on left is discontinuous, suspicious for fracture.  Consider cross-sectional images for confirmation.    SHASTA AMAYA MD   CT Pelvis Bone wo Contrast    Narrative    CT pelvis without contrast    History: Left hip pain with acetabular irregularity.    Techniques: Helical acquisition of images through the pelvis was  obtained without administration of contrast.    DLP: 1423 mGy-cm    Comparison: Radiograph from the same day. PET/CT from May 25, 2018.    Findings:    There is minimally displaced fracture involving the anterior wall and  column of the left acetabulum extending into the superior pubic ramus.  Additionally, there is minimally displaced fracture of the left  inferior pubic ramus.    Additionally, essentially nondisplaced fracture of the left sacrum is  also present. There is also likely nondisplaced fracture of right  sacrum indicated by subtle cortical buckling. These are likely  insufficiency fracture.    Bones appear diffusely osteopenic. Degenerative changes of the  visualized lower lumbar spine, particularly notable facet  arthropathies. Trace anterolisthesis of L4 on L5. Hypertrophic changes  of spinous processes.    Postsurgical change of right hip arthroplasty with beam  "hardening  artifact partially compromising assessment. Mild degenerative change  of left hip with focal lucency in the medial aspect of left femoral  neck, may be intraosseous lipoma.    Degenerative changes of bilateral sacroiliac joints. Pelvic  enthesopathy.    Partially visualized gallbladder demonstrates cholelithiasis. Colonic  diverticulosis. Trace free fluid in the pelvis, nonspecific. Mild  subcutaneous soft tissue strandings.      Impression    IMPRESSION:  1. Minimally displaced left acetabular fracture including anterior  wall and column with extension into the superior pubic ramus.  2. Minimally displaced left inferior pubic ramus fracture.  3. Minimally displaced left sacral and likely right sacral fractures.  4. Osteopenia.  5. Cholelithiasis.    MELVI BURNETT       Recent vital signs:   /72  Pulse 88  Temp 98.4  F (36.9  C) (Oral)  Resp 18  Ht 1.626 m (5' 4\")  Wt 54.4 kg (120 lb)  LMP  (LMP Unknown)  SpO2 100%  BMI 20.6 kg/m2    Cardiac Rhythm: Normal Sinus  Pt needs tele? No  Skin/wound Issues: pt has excoriation on elfego-area    Code Status: Full Code    Pain control: fair    Nausea control: pt had none    Abnormal labs/tests/findings requiring intervention:     Family present during ED course? Yes   Family Comments/Social Situation comments:  at bedside    Tasks needing completion: None    June Howard, RN    [phone] [facility]    "

## 2018-11-01 NOTE — LETTER
Transition Communication Hand-off for Care Transitions to Next Level of Care Provider    Name: Elizabeth Taylor  : 1940  MRN #: [id number]  Primary Care Provider: Baron Seth     Primary Clinic: [address]  [facility] [facility] [address]     Reason for Hospitalization:  Pathological fracture of pelvis, unspecified pathological cause, initial encounter [M84.454A]  Admit Date/Time: 2018  1:48 PM  Discharge Date: 18  Payor Source: Payor: MEDICARE / Plan: MEDICARE / Product Type: Medicare /              Reason for Communication Hand-off Referral: Fragility    Discharge Plan: [facility]       Concern for non-adherence with plan of care:   Y/N N  Discharge Needs Assessment:  Needs       Most Recent Value    Equipment Currently Used at Home walker, rolling    Transportation Available family or friend will provide          Already enrolled in Tele-monitoring program and name of program:  U  Follow-up specialty is recommended: Yes    Follow-up plan:  Future Appointments  Date Time Provider Department Center   2018 6:00 AM Felisha Peña PT [facility] O   2018 6:00 AM [facility] OT OVERFLOW [facility] O   2018 2:00 PM Zaire Phan MD [facility]   2018 11:00 AM Emir Rosas MD [facility]   2018 8:00 AM  [facility]   2018 8:20 AM Tim Larose PA [facility]   2018 10:00 AM Zaire Phan MD [facility]   2019 9:00 AM  [facility]   2019 9:40 AM [facility]   2019 12:30 PM Shen Ray MD [facility]   2019 2:00 PM Zaire Madison MD [facility]       Any outstanding tests or procedures:        Radiology & Cardiology Orders     Future Labs/Procedures Complete By Expires    DX Body Composition  As directed 2019          Supplies     Future Labs/Procedures    Pneumatic Compression Device      Comments:     Bilateral calf. Remove 30 mins BID.          Key Recommendations:      Estephania Canchola    AVS/Discharge Summary is the source of truth; this is a helpful guide for improved communication of patient story

## 2018-11-01 NOTE — PHARMACY-ADMISSION MEDICATION HISTORY
Admission medication history interview status for the 11/1/2018 admission is complete. See Epic admission navigator for allergy information, pharmacy, prior to admission medications and immunization status.     Medication history interview sources: patient, [facility]    Changes made to PTA medication list (reason)  Added: vitamin E (per patient-- could not recall dose), iron (per patient)  Deleted: none  Changed:   - Tylenol: clarified directions to 1000 mg 3 times daily    Additional medication history information (including reliability of information, actions taken by pharmacist):  - Patient seemed intermittently confused at times during the interview. However, she was able to report most medications. Updated PTA list with information from the patient and [facility].  - Patient reports using an over-the-counter cream for cramping in her leg, but she cannot recall the name of it or ingredients. She reports she has used it twice in the past few days.      Prior to Admission medications    Medication Sig Last Dose Taking? Auth Provider   acetaminophen (TYLENOL) 500 MG tablet Take 1,000 mg by mouth 3 times daily 11/1/2018 at AM Yes Unknown, Entered By History   calcium-vitamin D (CALTRATE) 600-400 MG-UNIT per tablet Take 1 tablet by mouth daily Past Week at Unknown time Yes Brenda Gurorla PA-C   ferrous sulfate (IRON) 325 (65 Fe) MG tablet Take 325 mg by mouth daily (with breakfast) Past Week at Unknown time Yes Unknown, Entered By History   simethicone (MYLICON) 40 MG/0.6ML suspension Take 0.6 mLs (40 mg) by mouth every 6 hours as needed for cramping Past Week at Unknown time Yes Brenda Gurrola PA-C   traMADol (ULTRAM) 50 MG tablet Take 1 tablet (50 mg) by mouth every 6 hours as needed for severe pain 11/1/2018 x2 at AM Yes Joanie Gomez APRN CNP   vitamin B complex with vitamin C (VITAMIN  B COMPLEX) TABS tablet Take 1 tablet by mouth daily Past Week at Unknown time Yes Reported, Patient    VITAMIN E PO Take 1 capsule by mouth daily Past Week at Unknown time Yes Unknown, Entered By History         Medication history completed by: Elizabeth Ley, PharmD IV Student

## 2018-11-01 NOTE — IP AVS SNAPSHOT
"    [address] [facility]: [phone]                                              INTERAGENCY TRANSFER FORM - PHYSICIAN ORDERS   2018                    Hospital Admission Date: 2018  GERHARD SU   : 1940  Sex: Female        Attending Provider: Pankaj Chew MD     Allergies:  Darvon [Propoxyphene], Penicillins, Ketoconazole    Infection:  None   Service:  GENERAL MEDI    Ht:  1.626 m (5' 4\")   Wt:  54.4 kg (120 lb)   Admission Wt:  54.4 kg (120 lb)    BMI:  20.6 kg/m 2   BSA:  1.57 m 2            Patient PCP Information     Provider PCP Type    Baron Seth MD General      ED Clinical Impression     Diagnosis Description Comment Added By Time Added    Pathological fracture of pelvis, unspecified pathological cause, initial encounter [M84.454A] Pathological fracture of pelvis, unspecified pathological cause, initial encounter [M84.454A]  Doni Miller MD 2018  5:37 PM      Hospital Problems as of 2018              Priority Class Noted POA    Hip fracture (H) Medium  2018 Yes      Non-Hospital Problems as of 2018              Priority Class Noted    Dry eye syndrome Medium  2012    Malignant neoplasm of anal canal (H) Medium  2018    Diarrhea Medium  3/19/2018    Femoral neck fracture (H) Medium  2018    Closed fracture of neck of right femur, initial encounter (H) Medium  2018      Code Status History     Date Active Date Inactive Code Status Order ID Comments User Context    2018  9:32 AM  Full Code [id number]  Steve Finn DO Outpatient    2018 10:58 PM 2018  9:32 AM Full Code [id number]  Lucia Tubbs MD Inpatient    2018  4:06 PM 2018 10:58 PM Full Code [id number]  Guzman Uriostegui PA-C Outpatient    2018 11:09 PM 2018  4:06 PM Full Code [id number]  Quin Jackson MD Inpatient    3/19/2018  3:51 PM 2018  6:41 PM Full Code [id number]  Jazmine Johns MD Inpatient         Medication " Review      START taking        Dose / Directions Comments    bisacodyl 10 MG Suppository   Commonly known as:  DULCOLAX   Used for:  Drug-induced constipation        Dose:  10 mg   Place 1 suppository (10 mg) rectally daily as needed for constipation   Quantity:  30 suppository   Refills:  0        cholecalciferol 5000 units Caps   Used for:  Age-related osteoporosis with current pathological fracture, initial encounter, Pathological fracture of pelvis, unspecified pathological cause, initial encounter        Dose:  5000 Units   Start taking on:  11/6/2018   Take 1 capsule (5,000 Units) by mouth daily   Refills:  0        docusate sodium 100 MG capsule   Commonly known as:  COLACE   Used for:  Drug-induced constipation        Dose:  100 mg   Take 1 capsule (100 mg) by mouth 2 times daily   Quantity:  60 capsule   Refills:  0        Lidocaine 4 % Patch   Commonly known as:  LIDOCARE   Used for:  Pathological fracture of pelvis, unspecified pathological cause, initial encounter        Dose:  2 patch   Place 2 patches onto the skin every 24 hours   Refills:  0        methocarbamol 500 MG tablet   Commonly known as:  ROBAXIN   Used for:  Pathological fracture of pelvis, unspecified pathological cause, initial encounter        Dose:  500 mg   Take 1 tablet (500 mg) by mouth every 6 hours as needed for muscle spasms   Quantity:  120 tablet   Refills:  0        oxyCODONE IR 5 MG tablet   Commonly known as:  ROXICODONE   Used for:  Pathological fracture of pelvis, unspecified pathological cause, initial encounter        Dose:  2.5 mg   Take 0.5 tablets (2.5 mg) by mouth every 4 hours as needed for moderate to severe pain   Quantity:  10 tablet   Refills:  0        polyethylene glycol Packet   Commonly known as:  MIRALAX/GLYCOLAX   Used for:  Drug-induced constipation        Dose:  17 g   Take 17 g by mouth daily as needed for constipation   Quantity:  7 packet   Refills:  0          CONTINUE these medications which have  NOT CHANGED        Dose / Directions Comments    acetaminophen 500 MG tablet   Commonly known as:  TYLENOL        Dose:  1000 mg   Take 1,000 mg by mouth 3 times daily   Refills:  0        calcium carbonate 600 mg-vitamin D 400 units 600-400 MG-UNIT per tablet   Commonly known as:  CALTRATE   Used for:  Age-related osteoporosis with current pathological fracture, initial encounter        Dose:  1 tablet   Take 1 tablet by mouth daily   Quantity:  60 tablet   Refills:  0        ferrous sulfate 325 (65 Fe) MG tablet   Commonly known as:  IRON        Dose:  325 mg   Take 325 mg by mouth daily (with breakfast)   Refills:  0        simethicone 40 MG/0.6ML suspension   Commonly known as:  MYLICON   Used for:  Malignant neoplasm of anal canal (H), Flatulence, eructation and gas pain        Dose:  40 mg   Take 0.6 mLs (40 mg) by mouth every 6 hours as needed for cramping   Refills:  0        traMADol 50 MG tablet   Commonly known as:  ULTRAM   Used for:  Malignant neoplasm of anal canal (H)        Dose:  50 mg   Take 1 tablet (50 mg) by mouth every 6 hours as needed for severe pain   Quantity:  30 tablet   Refills:  0        vitamin B complex with vitamin C Tabs tablet        Dose:  1 tablet   Take 1 tablet by mouth daily   Refills:  0        VITAMIN E PO        Dose:  1 capsule   Take 1 capsule by mouth daily   Refills:  0                Summary of Visit     Reason for your hospital stay       Pelvis fracture and pain management             After Care     Activity - Up with nursing assistance           Advance Diet as Tolerated       Follow this diet upon discharge: Orders Placed This Encounter      Regular Diet Adult       Encourage PO fluids           Fall precautions           General info for SNF       Length of Stay Estimate: Short Term Care: Estimated # of Days <30  Condition at Discharge: Improving  Level of care:skilled   Rehabilitation Potential: Good  Admission H&P remains valid and up-to-date: Yes  Recent  Chemotherapy: N/A  Use Nursing Home Standing Orders: Yes       Intake and output       Every shift       Mantoux instructions       Give two-step Mantoux (PPD) Per Facility Policy Yes       Weight bearing status       Able to bear weight in both lower extremities.             Supplies     Pneumatic Compression Device        Bilateral calf. Remove 30 mins BID.             Radiology & Cardiology Orders     Future Labs/Procedures Complete By Expires    DX Body Composition  As directed 11/2/2019      Radiology & Cardiology Orders     DX Body Composition                 Your next 10 appointments already scheduled     Nov 16, 2018  2:00 PM CST   (Arrive by 1:30 PM)   RETURN SPINE with Zaire Phan MD   [facility] ([facility])    [address] [place] [address]   [phone]            Nov 26, 2018 11:00 AM CST   (Arrive by 10:45 AM)   Return Visit with Emir Rosas MD   [facility] ([facility])    [address] [place] [address]   [phone]            Nov 27, 2018  8:00 AM CST   [facility] Draw with  [facility]   [facility] Draw ([facility])    [address] [place] [address]  [address]  [facility] [address]   [phone]            Nov 27, 2018  8:20 AM CST   (Arrive by 8:05 AM)   Return Visit with HAIDER Avina   [facility] ([facility])    [address] [place] [address]  [address]  [facility] [address]   [phone]            Nov 27, 2018 10:00 AM CST   (Arrive by 9:30 AM)   RETURN SPINE with Zaire Phan MD   [facility] ([facility])    [address] [place] [address]   [phone]            Feb 22, 2019  9:00 AM CST   [facility] Draw with [facility]   [facility]  Draw ([facility])    [address]  [address]  [facility] [address]   [phone]            Feb 22, 2019  9:40 AM CST   CT ABDOMEN PELVIS W CONTRAST with UC[facility]   [facility] CT ([facility])    [address]  1st Floor  [facility] [address]   [phone]           How do I prepare for my exam? (Food and drink instructions) To prepare: Do not eat or drink for 2 hours before your exam. If you need to take medicine, you may take it with small sips of water. (We may ask you to take liquid medicine as well.)  How do I prepare for my exam? (Other instructions) Please arrive 30 minutes early for your CT.  Once in the department you might be asked to drink water 15-20 minutes prior to your exam.  If indicated you may be asked to drink an oral contrast in advance of your CT.  If this is the case, the imaging team will let you know or be in contact with you prior to your appointment  Patients over 70 or patients with diabetes or kidney problems: If you haven t had a blood test (creatinine test) within the last 30 days, the Cardiologist/Radiologist may require you to get this test prior to your exam.  If you have diabetes:  Continue to take your metformin medication on the day of your exam  What should I wear: Please wear loose clothing, such as a sweat suit or jogging clothes. Avoid snaps, zippers and other metal. We may ask you to undress and put on a hospital gown.  How long does the exam take: Most scans take less than 20 minutes.  What should I bring: Please bring any scans or X-rays taken at other hospitals, if similar tests were done. Also bring a list of your medicines, including vitamins, minerals and over-the-counter drugs. It is safest to leave personal items at home.  Do I need a : No  is needed.  What do I need to tell my doctor? Be sure to tell your doctor: * If you have any allergies. * If there s any chance you  are pregnant. * If you are breastfeeding.  What should I do after the exam: No restrictions, You may resume normal activities.  What is this test: A CT (computed tomography) scan is a series of pictures that allows us to look inside your body. The scanner creates images of the body in cross sections, much like slices of bread. This helps us see any problems more clearly. You may receive contrast (X-ray dye) before or during your scan. You will be asked to drink the contrast.  Who should I call with questions: If you have any questions, please call the Imaging Department where you will have your exam. Directions, parking instructions, and other information is available on our website, [email or web address].            Feb 22, 2019 12:30 PM CST   (Arrive by 12:15 PM)   Return Visit with Shen Ray MD   [facility] ([facility])    [address]  [address]  [facility] [address]   [phone]            Feb 22, 2019  2:00 PM CST   Return Visit with Zaire Madison MD   Radiation Oncology Clinic ([facility])    [facility]  1st Floor  [address] [address]   [phone]              Follow-Up Appointment Instructions     Future Labs/Procedures    Follow Up ([facility])     Comments:    Follow up with primary care provider, Baron Seth, in one month for hospital follow- up and to follow up on results.  The following labs/tests are recommended: Please follow up DEXA results with patient. Recommend initiating Forteo 20 mcg subcutaneous daily for two years and then repeat DEXA, monitor Calcium and Phosphorus every three months while on Forteo. This will require prior authorization which we recommend pursuing. May consider bisphosphonate therapy if Forteo not a viable option. Also recommend hemoglobin, iron studies at post hospital visit. Please also follow up additional inpatient osteoporosis workup  done during hospitalization.     Appointments on [facility] and/or [facility] (with [facility] or [facility] provider or service). Call [phone] if you haven't heard regarding these appointments within 7 days of discharge.      Follow-Up Appointment Instructions     Follow Up ([facility])       Follow up with primary care provider, Baron Seth, in one month for hospital follow- up and to follow up on results.  The following labs/tests are recommended: Please follow up DEXA results with patient. Recommend initiating Forteo 20 mcg subcutaneous daily for two years and then repeat DEXA, monitor Calcium and Phosphorus every three months while on Forteo. This will require prior authorization which we recommend pursuing. May consider bisphosphonate therapy if Forteo not a viable option. Also recommend hemoglobin, iron studies at post hospital visit. Please also follow up additional inpatient osteoporosis workup done during hospitalization.     Appointments on [facility] and/or [facility] (with [facility] or [facility] provider or service). Call [phone] if you haven't heard regarding these appointments within 7 days of discharge.             Statement of Approval     Ordered          11/05/18 140  I have reviewed and agree with all the recommendations and orders detailed in this document.  EFFECTIVE NOW     Approved and electronically signed by:  Steve Finn DO

## 2018-11-01 NOTE — IP AVS SNAPSHOT
` `     UNIT [address] [facility] BANK: [phone]            Medication Administration Report for Elizabeth Taylor as of 11/05/18 1414   Legend:    Given Hold Not Given Due Canceled Entry Other Actions    Time Time (Time) Time  Time-Action       Inactive    Active    Linked        Medications 10/30/18 10/31/18 11/01/18 11/02/18 11/03/18 11/04/18 11/05/18    acetaminophen (TYLENOL) tablet 1,000 mg  Dose: 1,000 mg  Freq: 3 TIMES DAILY Route: PO  Start: 11/02/18 0800   Admin Instructions: Maximum acetaminophen dose from all sources = 75 mg/kg/day not to exceed 4 gram    Admin. Amount: 2 tablet (2 × 500 mg tablet)  Last Admin: 11/05/18 1409  Dispense Loc: [facility] ADS 7B        0830 (1,000 mg)-Given       1317 (1,000 mg)-Given       2048 (1,000 mg)-Given        0743 (1,000 mg)-Given       1500 (1,000 mg)-Given       2014 (1,000 mg)-Given        0807 (1,000 mg)-Given       1329 (1,000 mg)-Given       2043 (1,000 mg)-Given        0729 (1,000 mg)-Given       1409 (1,000 mg)-Given       [ ] 2000           bisacodyl (DULCOLAX) Suppository 10 mg  Dose: 10 mg  Freq: DAILY PRN Route: RE  PRN Reason: constipation  Start: 11/01/18 2258   Admin Instructions: Hold for loose stools.  This is the third step of a three step constipation treatment.    Admin. Amount: 1 suppository (1 × 10 mg suppository)  Dispense Loc: [facility] ADS 7B               calcium carbonate (TUMS) chewable tablet 500 mg  Dose: 500 mg  Freq: 3 TIMES DAILY Route: PO  Start: 11/03/18 0903   Admin. Amount: 1 tablet (1 × 500 mg tablet)  Last Admin: 11/05/18 0729  Dispense Loc: [facility] ADS 7B         1045 (500 mg)-Given       1500 (500 mg)-Given       2015 (500 mg)-Given        0807 (500 mg)-Given       1329 (500 mg)-Given       2043 (500 mg)-Given        0729 (500 mg)-Given       (1410)-Not Given       [ ] 2000           cholecalciferol (vitamin D3) capsule CAPS 5,000 Units  Dose: 5,000 Units  Freq: DAILY Route: PO  Start: 11/04/18 1200   Admin. Amount: 1 capsule (1 ×  5,000 Units capsule)  Last Admin: 11/05/18 0729  Dispense Loc: [facility] ADS 7B          1329 (5,000 Units)-Given        0729 (5,000 Units)-Given           docusate sodium (COLACE) capsule 100 mg  Dose: 100 mg  Freq: 2 TIMES DAILY Route: PO  Start: 11/02/18 1015   Admin. Amount: 1 capsule (1 × 100 mg capsule)  Last Admin: 11/05/18 0729  Dispense Loc: [facility] ADS 7B        1055 (100 mg)-Given       2048 (100 mg)-Given        0739 (100 mg)-Given       2014 (100 mg)-Given        (0807)-Not Given       2048 (100 mg)-Given        0729 (100 mg)-Given       [ ] 2000           enoxaparin (LOVENOX) injection 40 mg  Dose: 40 mg  Freq: EVERY 24 HOURS Route: SC  Start: 11/01/18 2300   Admin Instructions: HOLD if platelet count falls below 50% of baseline or less than 100,000/ L and notify provider.    Admin. Amount: 40 mg = 0.4 mL Conc: 40 mg/0.4 mL  Last Admin: 11/04/18 2043  Dispense Loc: [facility] ADS 7B  Volume: 0.4 mL       2312 (40 mg)-Given        2247 (40 mg)-Given        (2310)-Not Given        2043 (40 mg)-Given        [ ] 2000           ferrous sulfate (IRON) tablet 325 mg  Dose: 325 mg  Freq: DAILY WITH BREAKFAST Route: PO  Start: 11/02/18 0800   Admin Instructions: Absorbed best on an empty stomach. If stomach upset occurs, can take with meals.    Admin. Amount: 1 tablet (1 × 325 mg tablet)  Last Admin: 11/05/18 0729  Dispense Loc: [facility] ADS 7B        0830 (325 mg)-Given        0739 (325 mg)-Given        0807 (325 mg)-Given        0729 (325 mg)-Given           heparin lock flush 10 UNIT/ML injection 5 mL  Dose: 5 mL  Freq: DAILY Route: IK  Start: 11/02/18 0800   Admin. Amount: 5 mL  Last Admin: 11/05/18 1410  Dispense Loc: [facility] ADS 7B  Volume: 5 mL   Current Line: Port A Cath Single 02/08/18 Right Chest wall       0830 (5 mL)-Given        0739 (5 mL)-Given        0758 (5 mL)-Given        0730 (5 mL)-Given       1410 (5 mL)-Given [C]           Lidocaine (LIDOCARE) 4 % Patch 2 patch  Dose: 2 patch  Freq: EVERY 24  HOURS 0800 Route: TD  Start: 11/02/18 1015   Admin Instructions: Apply patch(s) to left hip/pelvis. To prevent lidocaine toxicity, patient should be patch free for 12 hrs daily. Patches may be cut to smaller size prior to removing release liner.  NEVER APPLY HEAT OVER PATCH which increases absorption and risk of local anesthetic toxicity. Do not apply over area where liposomal bupivacaine was injected for 96 hours post injection.    Admin. Amount: 2 patch  Last Admin: 11/04/18 0809  Dispense Loc: [facility] ADS 7B  Infused Over: 12 Hours        1055 (2 patch)-Given        0739 (2 patch)-Given        0809 (2 patch)-Given        (0728)-Not Given           lidocaine patch in PLACE  Freq: EVERY 8 HOURS Route: TD  Start: 11/02/18 1015   Admin Instructions: Chart every shift, confirming that patch is still in place on patient (no barcode scan needed). See patch order for dose information.  NEVER APPLY HEAT OVER PATCH which will increase absorption and may lead to risk of local anesthetic toxicity. Do not apply over area where liposomal bupivacaine injected for 96 hours.    Last Admin: 11/03/18 1815  Dispense Loc: [facility]        1055 ( )-Patch in Place       1816 ( )-Patch in Place               1040 ( )-Patch in Place [C]       1815 ( )-Patch in Place               (1125)-Not Given [C]       (1720)-Not Given               (1018)-Not Given       [ ] 1815           lidocaine patch REMOVAL  Freq: EVERY 24 HOURS 2000 Route: TD  Start: 11/02/18 2000   Admin Instructions: Patient should have a 12 hour patch free interval    Dispense Loc: [facility]        2048 ( )-Patch Removed        2000 ( )-Patch Removed                [ ] 2000           methocarbamol (ROBAXIN) tablet 500 mg  Dose: 500 mg  Freq: EVERY 6 HOURS PRN Route: PO  PRN Reason: muscle spasms  Start: 11/03/18 1145   Admin. Amount: 1 tablet (1 × 500 mg tablet)  Last Admin: 11/05/18 1350  Dispense Loc: [facility] ADS 7B         1230 (500 mg)-Given        2313 (500 mg)-Given        1129 (500 mg)-Given       1928 (500 mg)-Given        0133 (500 mg)-Given       0729 (500 mg)-Given       1350 (500 mg)-Given           naloxone (NARCAN) injection 0.1-0.4 mg  Dose: 0.1-0.4 mg  Freq: EVERY 2 MIN PRN Route: IV  PRN Reason: opioid reversal  Start: 11/01/18 2258   Admin Instructions: For respiratory rate LESS than or EQUAL to 8.  Partial reversal dose:  0.1 mg titrated q 2 minutes for Analgesia Side Effects Monitoring Sedation Level of 3 (frequently drowsy, arousable, drifts to sleep during conversation).Full reversal dose:  0.4 mg bolus for Analgesia Side Effects Monitoring Sedation Level of 4 (somnolent, minimal or no response to stimulation).  For ordered IV doses 0.1-2mg give IVP. Give each 0.4mg over 15 seconds in emergency situations. For non-emergent situations further dilute in 9mL of NS to facilitate titration of response.    Admin. Amount: 0.1-0.4 mg = 0.25-1 mL Conc: 0.4 mg/mL  Dispense Loc: [facility] ADS 7B  Volume: 1 mL               ondansetron (ZOFRAN-ODT) ODT tab 4 mg  Dose: 4 mg  Freq: EVERY 6 HOURS PRN Route: PO  PRN Reasons: nausea,vomiting  Start: 11/01/18 2258   Admin Instructions: This is Step 1 of nausea and vomiting management.  If nausea not resolved in 15 minutes, go to Step 2 prochlorperazine (COMPAZINE). Do not push through foil backing. Peel back foil and gently remove. Place on tongue immediately. Administration with liquid unnecessary  With dry hands, peel back foil backing and gently remove tablet; do not push oral disintegrating tablet through foil backing; administer immediately on tongue and oral disintegrating tablet dissolves in seconds; then swallow with saliva; liquid not required.    Admin. Amount: 1 tablet (1 × 4 mg tablet)  Dispense Loc: [facility] ADS 7B              Or  ondansetron (ZOFRAN) injection 4 mg  Dose: 4 mg  Freq: EVERY 6 HOURS PRN Route: IV  PRN Reasons: nausea,vomiting  Start: 11/01/18 2258   Admin Instructions: This is Step  1 of nausea and vomiting management.  If nausea not resolved in 15 minutes, go to Step 2 prochlorperazine (COMPAZINE).  Irritant. For ordered IV doses 0.1-4 mg, give IV Push undiluted over 2-5 minutes.    Admin. Amount: 4 mg = 2 mL Conc: 4 mg/2 mL  Dispense Loc: [facility] ADS 7B  Infused Over: 2-5 Minutes  Volume: 2 mL               oxyCODONE IR (ROXICODONE) half-tab 2.5 mg  Dose: 2.5 mg  Freq: EVERY 4 HOURS PRN Route: PO  PRN Reason: moderate to severe pain  Start: 11/02/18 1004   Admin. Amount: 1 half-tab (1 × 2.5 mg half-tab)  Last Admin: 11/05/18 1409  Dispense Loc: [facility] ADS 7B        1317 (2.5 mg)-Given       1758 (2.5 mg)-Given       2248 (2.5 mg)-Given        0323 (2.5 mg)-Given       0901 (2.5 mg)-Given       1500 (2.5 mg)-Given       2014 (2.5 mg)-Given        0205 (2.5 mg)-Given       0807 (2.5 mg)-Given       1720 (2.5 mg)-Given       2235 (2.5 mg)-Given        0251 (2.5 mg)-Given       1018 (2.5 mg)-Given       1409 (2.5 mg)-Given           polyethylene glycol (MIRALAX/GLYCOLAX) Packet 17 g  Dose: 17 g  Freq: DAILY Route: PO  Start: 11/02/18 1015   Admin Instructions: 1 Packet = 17 grams. Mixed prescribed dose in 8 ounces of water. Follow with 8 oz. of water.    Admin. Amount: 17 g  Last Admin: 11/02/18 1056  Dispense Loc: [facility] ADS 7B        1056 (17 g)-Given        (0743)-Not Given        (0808)-Not Given        (0729)-Not Given           polyethylene glycol (MIRALAX/GLYCOLAX) Packet 17 g  Dose: 17 g  Freq: DAILY PRN Route: PO  PRN Reason: constipation  Start: 11/01/18 2258   Admin Instructions: Give in 8oz of  water, juice, or soda. Hold for loose stools.  This is the second step of a three step constipation treatment.  1 Packet = 17 grams. Mixed prescribed dose in 8 ounces of water. Follow with 8 oz. of water.    Admin. Amount: 17 g  Dispense Loc: [facility] ADS 7B               senna-docusate (SENOKOT-S;PERICOLACE) 8.6-50 MG per tablet 1 tablet  Dose: 1 tablet  Freq: 2 TIMES DAILY PRN Route: PO  PRN  Reason: constipation  Start: 18   Admin Instructions: If no bowel movement in 24 hours, increase to 2 tablets PO.  Hold for loose stools.  This is the first step of a three step constipation treatment.    Admin. Amount: 1 tablet  Last Admin: 18  Dispense Loc: [facility] ADS 7B           (1 tablet)-Given           Or  senna-docusate (SENOKOT-S;PERICOLACE) 8.6-50 MG per tablet 2 tablet  Dose: 2 tablet  Freq: 2 TIMES DAILY PRN Route: PO  PRN Reason: constipation  Start: 18   Admin Instructions: Hold for loose stools.  This is the first step of a three step constipation treatment.    Admin. Amount: 2 tablet  Dispense Loc: [facility] ADS 7B                      simethicone (MYLICON) suspension 40 mg  Dose: 40 mg  Freq: EVERY 6 HOURS PRN Route: PO  PRN Reason: cramping  Start: 18   Admin. Amount: 40 mg = 0.6 mL Conc: 66.666 mg/mL  Dispense Loc: [facility]  Volume: 30 mL              Completed Medications  Medications 10/30/18 10/31/18 11/01/18 11/02/18 11/03/18 11/04/18 11/05/18         Dose: 20 mL  Freq: ONCE Route: IV  Start: 18 0800   End: 18   Admin. Amount: 20 mL  Last Admin: 18  Dispense Loc: [facility] Floor Stock  Administrations Remainin  Volume: 20 mL   Current Line: Port A Cath Single 18 Right Chest wall         0757 (20 mL)-Given           Discontinued Medications  Medications 10/30/18 10/31/18 11/01/18 11/02/18 11/03/18 11/04/18 11/05/18         Dose: 1,000 Units  Freq: DAILY Route: PO  Start: 18 0903   End: 18 1149   Admin. Amount: 1 tablet (1 × 1,000 Units tablet)  Last Admin: 18 0807  Dispense Loc: [facility] ADS 7B         1045 (1,000 Units)-Given        08 (1,000 Units)-Given       1149-Med Discontinued          Dose: 5,000 Units  Freq: DAILY Route: PO  Start: 18   End: 18   Admin. Amount: 5 tablet (5 × 1,000 Units tablet)  Dispense Loc: [facility] ADS 7B                 1149-Med  Discontinued          Dose: 500 mg  Freq: 2 TIMES DAILY PRN Route: PO  PRN Reason: muscle spasms  Start: 11/02/18 0013   End: 11/03/18 1144   Admin. Amount: 1 tablet (1 × 500 mg tablet)  Last Admin: 11/03/18 0606  Dispense Loc: [facility] ADS 7B        0040 (500 mg)-Given       1758 (500 mg)-Given       2248 (500 mg)-Given        0606 (500 mg)-Given       1144-Med Discontinued

## 2018-11-01 NOTE — H&P
"[facility] History and Physical        Date of Admission:  11/1/2018    Chief Complaint   Left leg pain    History is obtained from the patient and family member at bedside.    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     Patient reports she was recovering from right hip hemiarthroplasty at a rehab center and was discharged 9/11/18. Since that time she has been living with her sister and brother-in-law and had been feeling stronger every day. She had been walking regularly [place]. Approximately one week ago she noticed left hip pain with walking and the following evening got up to use the commode when she \"lost her balance or had a leg spasm\" and hit the wall. She denies any other trauma. On 10/29, pain had worsened and her home health nurse requested Tramadol. Patient is unsure whether Tramadol actually improved symptoms as she took it at night. Today, pain was not controlled and family took her to the ED.     Family x3 were able to assist patient to the ED. While in the ED, patient was hemodynamically stable. Labs significant for hemoglobin 11.2 (similar to baseline), electrolytes WNL, creatinine 0.48, normal ESR/CRP. Xray left hip/pelvis suspicious for fracture, thus CT pelvis obtained which showed minimally displaced left acetabular fracture, inferior pubic ramus, left sacral and right sacral fractures. Ortho saw the patient in the ED and per ED note did not recommend operative management.    Here, patient has no pain at rest. She does report significant pain with movement as well as left hip swelling. Her left leg is always more swollen than the right, but this has " improved on both legs. The redness in her legs has also been improving per patient. She denies trauma, left knee or ankle pain, numbness, tingling, recent cough, cold, fevers, chest pain, SOB, headaches, abdominal pain, changes in bowel or bladder.    Of note, patient has had radiation and chemotherapy for her anal cancer. She is not currently on chemotherapy. Her follow-up with Dr. Rosas of colorectal surgery scheduled for 7/30/2018 was canceled while she was inpatient for right hip surgery.  She did see Dr. Rosas on 9/16/18 with plan for MR in 6 months and anoscopy. She does have another follow up on 11/26/18.    Review of Systems   The 10 point Review of Systems is negative other than noted in the HPI or here.     Past Medical History    Anal cancer  Endometriosis  Thyroiditis, resolved  Osteoporosis     I have reviewed this patient's medical history and updated it with pertinent information if needed.     Past Surgical History   Appendectomy  Right hip arthroplasty    I have reviewed this patient's surgical history and updated it with pertinent information if needed.      Social History   Nonsmoker. No alcohol or drug use.     Family History   Cancer - sister  Lymphoma - maternal grandmother  Heart disease, MI - father  Uterine cancer - niece      Prior to Admission Medications   Prior to Admission Medications   Prescriptions Last Dose Informant Patient Reported? Taking?   VITAMIN E PO Past Week at Unknown time  Yes Yes   Sig: Take 1 capsule by mouth daily   acetaminophen (TYLENOL) 500 MG tablet 11/1/2018 at AM  Yes Yes   Sig: Take 1,000 mg by mouth 3 times daily   calcium-vitamin D (CALTRATE) 600-400 MG-UNIT per tablet Past Week at Unknown time  No Yes   Sig: Take 1 tablet by mouth daily   ferrous sulfate (IRON) 325 (65 Fe) MG tablet Past Week at Unknown time  Yes Yes   Sig: Take 325 mg by mouth daily (with breakfast)   simethicone (MYLICON) 40 MG/0.6ML suspension Past Week at Unknown time  No Yes   Sig:  Take 0.6 mLs (40 mg) by mouth every 6 hours as needed for cramping   traMADol (ULTRAM) 50 MG tablet 11/1/2018 x2 at AM  No Yes   Sig: Take 1 tablet (50 mg) by mouth every 6 hours as needed for severe pain   vitamin B complex with vitamin C (VITAMIN  B COMPLEX) TABS tablet Past Week at Unknown time  Yes Yes   Sig: Take 1 tablet by mouth daily      Facility-Administered Medications: None     Allergies   Darvon  Penicillins  Ketoconazole     Physical Exam   Vital Signs: Temp: 98.4  F (36.9  C) Temp src: Oral BP: 132/72 Pulse: 88   Resp: 18 SpO2: 100 % O2 Device: None (Room air)    Weight: 120 lbs 0 oz    GENERAL: Thin appearing, sitting upright in bed eating, pleasant, NAD  SKIN: Warm, dry  HEENT:  Head: Normocephalic, atraumatic  Eyes: Conjunctiva clear, sclera non-icteric, EOM intact  Ears: Hearing grossly intact  Nose: No external lesions  Mouth: Mucous membranes moist  CARDIO: RRR, no m/r/g  PULM: CTAB, no wheezes or crackles  GI: Soft, nontender, nondistended, normal bowel sounds, no masses  MSK: Bilateral lower extremity edema L>R, bilateral lower extremity erythema and crusted scaling to dorsum of feet, mild ecchymosis to anterior hip crease, intertrigo present  Bilateral 2+ DP pulses, full ROM of ankle and toes, lower extremities warm, sensation intact bilaterally.  ROM not assessed.   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    # Left hip pain  # Osteoporosis  # Multiple left hip fractures  Patient has been hemodynamically stable. No concern for osteomyelitis, overlying cellulitis given exam, CT findings and normal WBC, ESR, CRP. Etiology of fracture likely multifactorial; most likely 2/2 patient's significant radiation  history and osteoporosis, as well as her relatively recent right hip hemiarthroplasty and excess stress on the left leg for approximately four months. Ortho saw the patient in the ED and recommended conservative management. We will formally consult them in the AM.    - Ortho consult, appreciate recs  - Tramadol 50 mg Q4H PRN for pain, will consider changing pain management regimen when PT starts tomorrow  - Robaxin 500 mg BID PRN for muscle spasms  - Zofran PRN for nausea  - PT/OT consulted, appreciate recs  - Social work consult for care coordination and discharge planning, appreciate rec    # Anemia  11.2 today. 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 Not currently on chemotherapy    # S/p Right hip hemiarthroplasty 7/26/18  - PT/OT as above      Diet: Regular Diet Adult  Fluids: PO  Joiner Catheter: not present  DVT Prophylaxis: Enoxaparin (Lovenox) SQ  Code Status: Prior  Expected discharge: 2 - 3 days, recommended to transitional care unit once pain adequately managed and workup complete.         Venita Newton, MS4  Resident Attestation:  I was present with the medical student who participated in the service and in the documentation of this note. I have verified the history and personally performed the physical exam and medical decision making. I have verified the content of the note, which accurately reflects my assessment of the patient and the plan of care.   Supervising Resident Physician:  Lucia Tubbs MD, PGY-2         Data     Recent Labs  Lab 11/01/18  1526   WBC 5.3   HGB 11.2*   MCV 89         POTASSIUM 3.9   CHLORIDE 103   CO2 30   BUN 15   CR 0.48*   ANIONGAP 6   ELISE 8.9   GLC 80       Recent Results (from the past 24 hour(s))   XR Pelvis and Hip Left 2 Views    Narrative    Exam: XR PELVIS AND HIP LEFT 2 VIEWS, 11/1/2018 3:18 PM    Indication: severe  spasm and left hip pain, no fall.;     Comparison: X-ray 10/16/2018    Findings:   AP view of pelvis and 2 views left hip.    Iliopectineal line on left is discontinuous, suspicious for fracture.    Osteopenic appearance of bone. Right hip arthroplasty. Degenerative  changes of the lumbar spine. Soft tissue unremarkable.      Impression    Impression:   Iliopectineal line on left is discontinuous, suspicious for fracture.  Consider cross-sectional images for confirmation.    SHASTA AMAYA MD   CT Pelvis Bone wo Contrast    Narrative    CT pelvis without contrast    History: Left hip pain with acetabular irregularity.    Techniques: Helical acquisition of images through the pelvis was  obtained without administration of contrast.    DLP: 1423 mGy-cm    Comparison: Radiograph from the same day. PET/CT from May 25, 2018.    Findings:    There is minimally displaced fracture involving the anterior wall and  column of the left acetabulum extending into the superior pubic ramus.  Additionally, there is minimally displaced fracture of the left  inferior pubic ramus.    Additionally, essentially nondisplaced fracture of the left sacrum is  also present. There is also likely nondisplaced fracture of right  sacrum indicated by subtle cortical buckling. These are likely  insufficiency fracture.    Bones appear diffusely osteopenic. Degenerative changes of the  visualized lower lumbar spine, particularly notable facet  arthropathies. Trace anterolisthesis of L4 on L5. Hypertrophic changes  of spinous processes.    Postsurgical change of right hip arthroplasty with beam hardening  artifact partially compromising assessment. Mild degenerative change  of left hip with focal lucency in the medial aspect of left femoral  neck, may be intraosseous lipoma.    Degenerative changes of bilateral sacroiliac joints. Pelvic  enthesopathy.    Partially visualized gallbladder demonstrates cholelithiasis. Colonic  diverticulosis. Trace free  fluid in the pelvis, nonspecific. Mild  subcutaneous soft tissue strandings.      Impression    IMPRESSION:  1. Minimally displaced left acetabular fracture including anterior  wall and column with extension into the superior pubic ramus.  2. Minimally displaced left inferior pubic ramus fracture.  3. Minimally displaced left sacral and likely right sacral fractures.  4. Osteopenia.  5. Cholelithiasis.    MELVI BURNETT

## 2018-11-01 NOTE — IP AVS SNAPSHOT
"` `     [address] [facility]: [phone]                                              INTERAGENCY TRANSFER FORM - NURSING   2018                    Hospital Admission Date: 2018  GERHARD SU   : 1940  Sex: Female        Attending Provider: Pankaj Chew MD     Allergies:  Darvon [Propoxyphene], Penicillins, Ketoconazole    Infection:  None   Service:  GENERAL MEDI    Ht:  1.626 m (5' 4\")   Wt:  54.4 kg (120 lb)   Admission Wt:  54.4 kg (120 lb)    BMI:  20.6 kg/m 2   BSA:  1.57 m 2            Patient PCP Information     Provider PCP Type    Baron Seth MD General      Current Code Status     Date Active Code Status Order ID Comments User Context       Prior      Code Status History     Date Active Date Inactive Code Status Order ID Comments User Context    2018  9:32 AM  Full Code [id number]  Steve Finn DO Outpatient    2018 10:58 PM 2018  9:32 AM Full Code [id number]  Lucia Tubbs MD Inpatient    2018  4:06 PM 2018 10:58 PM Full Code [id number]  Guzman Uriostegui PA-C Outpatient    2018 11:09 PM 2018  4:06 PM Full Code [id number]  Quin Jackson MD Inpatient    3/19/2018  3:51 PM 2018  6:41 PM Full Code [id number]  Jazmine Johns MD Inpatient      Advance Directives        Scanned docmt in ACP Activity?           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    Diarrhea Medium  3/19/2018    Femoral neck fracture (H) Medium  2018    Closed fracture of neck of right femur, initial encounter (H) Medium  2018      Immunizations     None         END      ASSESSMENT     Discharge Profile Flowsheet     DISCHARGE NEEDS ASSESSMENT     Inspection of bony prominences  Full " "11/05/18 0737    Equipment Currently Used at Home  walker, rolling 11/03/18 0953   Inspection under devices  Full 11/05/18 0737    Transportation Available  family or friend will provide 11/03/18 0953   Not Inspected under devices  IV/art line hub 11/05/18 0129    GASTROINTESTINAL (ADULT,PEDIATRIC,OB)     Skin WDL  ex 11/05/18 0737    GI WDL  WDL 11/05/18 0737   Skin Temperature  warm 11/05/18 0737    Last Bowel Movement  11/04/18 11/05/18 0129   Skin Moisture  dry 11/05/18 0737    GI Signs/Symptoms  passing flatus 11/05/18 0129   Skin Integrity  drain/device(s);scar(s) 11/05/18 0737    Passing flatus  yes 11/05/18 0737   Skin Elasticity  quick return to original state 11/05/18 0737    COMMUNICATION ASSESSMENT     SAFETY      Patient's communication style  spoken language (English or Bilingual) 11/01/18 1329   Safety WDL  WDL 11/05/18 0742    SKIN     All Alarms  none present 11/05/18 0742                 Assessment WDL (Within Defined Limits) Definitions           Safety WDL     Effective: 09/28/15    Row Information: <b>WDL Definition:</b> Bed in low position, wheels locked; call light in reach; upper side rails up x 2; ID band on<br> <font color=\"gray\"><i>Item=AS safety wdl>>List=AS safety wdl>>Version=F14</i></font>      Skin WDL     Effective: 09/28/15    Row Information: <b>WDL Definition:</b> Warm; dry; intact; elastic; without discoloration; pressure points without redness<br> <font color=\"gray\"><i>Item=AS skin wdl>>List=AS skin wdl>>Version=F14</i></font>      Vitals     Vital Signs Flowsheet     VITAL SIGNS     ANALGESIA SIDE EFFECTS MONITORING      Temp  97.7  F (36.5  C) 11/05/18 0745   Side Effects Monitoring: Respiratory Quality  R 11/05/18 1019    Temp src  Oral 11/05/18 0745   Side Effects Monitoring: Respiratory Depth  N 11/05/18 1019    Resp  16 11/05/18 0745   Side Effects Monitoring: Sedation Level  1 11/05/18 1019    Pulse  68 11/05/18 0745   EMSE COMA SCALE      Pulse/Heart Rate Source  " "Monitor 11/05/18 0745   Best Eye Response  4-->(E4) spontaneous 11/05/18 0732    BP  117/61 11/05/18 0745   Best Motor Response  6-->(M6) obeys commands 11/05/18 0732    BP Location  Right arm 11/05/18 0745   Best Verbal Response  5-->(V5) oriented 11/05/18 0732    OXYGEN THERAPY     [place] Coma Scale Score  15 11/05/18 0732    SpO2  95 % 11/05/18 0745   Assessment Qualifiers  patient not sedated/intubated 11/03/18 1902    O2 Device  None (Room air) 11/05/18 0745   HEIGHT AND WEIGHT      PAIN/COMFORT     Height  1.626 m (5' 4\") 11/01/18 1335    Patient Currently in Pain  yes 11/05/18 1019   Height Method  Estimated 11/01/18 1335    Preferred Pain Scale  CAPA (Clinically Aligned Pain Assessment) ([facility] Adults Only) 11/05/18 1019   Weight  -- (cant tolerate) 11/02/18 2226    Pain Location  Hip 11/05/18 1019   BSA (Calculated - sq m)  1.57 11/01/18 1335    Pain Orientation  Left 11/05/18 1019   BMI (Calculated)  20.64 11/01/18 1335    Pain Descriptors  Aching;Spasm 11/05/18 1019   POSITIONING      Pain Intervention(s)  MD notified (Comment) 11/05/18 1114   Body Position  independently positioning 11/05/18 0732    Response to Interventions  No change in pain 11/05/18 1100   Head of Bed (HOB)  HOB at 30-45 degrees 11/05/18 0129    CLINICALLY ALIGNED PAIN ASSESSMENT (CAPA) ([facility] ADULTS ONLY)     Positioning/Transfer Devices  pillows;in use 11/05/18 0129    Comfort  tolerable with discomfort 11/05/18 1019   Chair  Upright in chair 11/05/18 0129    Change in Pain  about the same 11/05/18 1019   DAILY CARE      Pain Control  partially effective 11/05/18 1019   Activity Management  activity adjusted per tolerance 11/05/18 0732    Functioning  can do most things, but pain gets in the way of some 11/05/18 1019   Activity Assistance Provided  assistance, stand-by 11/05/18 0732    Sleep  awake with occasional pain 11/05/18 1019                 Patient Lines/Drains/Airways Status    Active " LINES/DRAINS/AIRWAYS     Name: Placement date: Placement time: Site: Days: Last dressing change:    Wound 11/04/18 Perineum 11/04/18      Perineum   1             Patient Lines/Drains/Airways Status    Active PICC/CVC     Name: Placement date: Placement time: Site: Days: Additional Info Last dressing change:    Port A Cath Single 02/08/18 Right Chest wall 02/08/18   1044   Chest wall   270 Orientation: Right            Power Port: Yes            Inserted by: Sujit Moreira PA-C            Total Catheter Length (cm): 24 cm            Line Flushed (See MAR): Yes            MRI Compatible: Yes            Diameter Bahamian Size: 6 Fr            Tip location: RA            Lot #: [id number]               Intake/Output Detail Report     Date Intake   Output Net    Shift P.O. IV Piggyback Total Urine Total       Day 11/04/18 0000 - 11/04/18 0659 480 -- 480 200 200 280    Ruma 11/04/18 0700 - 11/04/18 1459 300 -- 300 -- -- 300    Noc 11/04/18 1500 - 11/04/18 2359 630 -- 630 325 325 305    Day 11/05/18 0000 - 11/05/18 0659 240 -- 240 650 650 -410    Ruma 11/05/18 0700 - 11/05/18 1459 120 -- 120 400 400 -280      Last Void/BM       Most Recent Value    Urine Occurrence 1 at 11/05/2018 0500    Stool Occurrence 1 at 11/05/2018 1013      Case Management/Discharge Planning     Case Management/Discharge Planning Flowsheet     LIVING ENVIRONMENT     ABUSE RISK SCREEN      Lives With  sibling(s) 11/03/18 0953   QUESTION TO PATIENT:  Has a member of your family or a partner(now or in the past) intimidated, hurt, manipulated, or controlled you in any way?  no 11/01/18 1340    Living Arrangements  house 11/03/18 0953   QUESTION TO PATIENT: Do you feel safe going back to the place where you are living?  yes 11/01/18 1340    COPING/STRESS     OBSERVATION: Is there reason to believe there has been maltreatment of a vulnerable adult (ie. Physical/Sexual/Emotional abuse, self neglect, lack of adequate food, shelter, medical care, or financial  exploitation)?  no 11/01/18 1340    Major Change/Loss/Stressor  illness;hospitalization 11/01/18 2143   OTHER      DISCHARGE PLANNING     Are you depressed or being treated for depression?  No 11/01/18 2155    Transportation Available  family or friend will provide 11/03/18 0953   HOMICIDE RISK      FINAL RESOURCES     Feels Like Hurting Others  no 11/01/18 1340    Equipment Currently Used at Home  walker, rolling 11/03/18 0959

## 2018-11-01 NOTE — IP AVS SNAPSHOT
"    [address] [facility]: [phone]                                              INTERAGENCY TRANSFER FORM - LAB / IMAGING / EKG / EMG RESULTS   2018                    Hospital Admission Date: 2018  GERHARD SU   : 1940  Sex: Female        Attending Provider: Pankaj Chew MD     Allergies:  Darvon [Propoxyphene], Penicillins, Ketoconazole    Infection:  None   Service:  GENERAL MEDI    Ht:  1.626 m (5' 4\")   Wt:  54.4 kg (120 lb)   Admission Wt:  54.4 kg (120 lb)    BMI:  20.6 kg/m 2   BSA:  1.57 m 2            Patient PCP Information     Provider PCP Type    Baron Seth MD General         Lab Results - 3 Days      Basic metabolic panel [[id number]] (Abnormal)  Resulted: 18, Result status: Final result    Ordering provider: Jerrell Schneider MD  18 Resulting lab: [facility]    Specimen Information    Type Source Collected On   Blood  1840          Components       Value Reference Range Flag Lab   Sodium 142 133 - 144 mmol/L  51   Potassium 4.1 3.4 - 5.3 mmol/L  51   Chloride 106 94 - 109 mmol/L  51   Carbon Dioxide 29 20 - 32 mmol/L  51   Anion Gap 6 3 - 14 mmol/L  51   Glucose 100 70 - 99 mg/dL H 51   Urea Nitrogen 29 7 - 30 mg/dL  51   Creatinine 0.52 0.52 - 1.04 mg/dL  51   GFR Estimate >90 >60 mL/min/1.7m2  51   Comment:  Non  GFR Calc   GFR Estimate If Black >90 >60 mL/min/1.7m2  51   Comment:  African American GFR Calc   Calcium 8.9 8.5 - 10.1 mg/dL  51            Magnesium [[id number]]  Resulted: 18, Result status: Final result    Ordering provider: Jerrell Schneider MD  18 Resulting lab: [facility]    Specimen Information    Type Source Collected On   Blood  1840          Components       Value Reference Range Flag Lab   Magnesium 1.8 1.6 - 2.3 mg/dL  51            Phosphorus [[id number]]  Resulted: 18 " 0620, Result status: Final result    Ordering provider: Jerrell Schneider MD  11/05/18 0100 Resulting lab: [facility]    Specimen Information    Type Source Collected On   Blood  11/05/18 0540          Components       Value Reference Range Flag Lab   Phosphorus 4.2 2.5 - 4.5 mg/dL  51            CBC with platelets differential [[id number]] (Abnormal)  Resulted: 11/05/18 0601, Result status: Final result    Ordering provider: Jerrell Schneider MD  11/05/18 0100 Resulting lab: [facility]    Specimen Information    Type Source Collected On   Blood  11/05/18 0540          Components       Value Reference Range Flag Lab   WBC 5.7 4.0 - 11.0 10e9/L  51   RBC Count 4.44 3.8 - 5.2 10e12/L  51   Hemoglobin 12.2 11.7 - 15.7 g/dL  51   Hematocrit 40.1 35.0 - 47.0 %  51   MCV 90 78 - 100 fl  51   MCH 27.5 26.5 - 33.0 pg  51   MCHC 30.4 31.5 - 36.5 g/dL L 51   RDW 16.7 10.0 - 15.0 % H 51   Platelet Count 269 150 - 450 10e9/L  51   Diff Method Automated Method   51   % Neutrophils 68.6 %  51   % Lymphocytes 17.0 %  51   % Monocytes 10.5 %  51   % Eosinophils 3.2 %  51   % Basophils 0.5 %  51   % Immature Granulocytes 0.2 %  51   Nucleated RBCs 0 0 /100  51   Absolute Neutrophil 3.9 1.6 - 8.3 10e9/L  51   Absolute Lymphocytes 1.0 0.8 - 5.3 10e9/L  51   Absolute Monocytes 0.6 0.0 - 1.3 10e9/L  51   Absolute Eosinophils 0.2 0.0 - 0.7 10e9/L  51   Absolute Basophils 0.0 0.0 - 0.2 10e9/L  51   Abs Immature Granulocytes 0.0 0 - 0.4 10e9/L  51   Absolute Nucleated RBC 0.0   51            Bone specific alk phosphatase [[id number]]  Resulted: 11/05/18 0542, Result status: In process    Ordering provider: Pankaj Chew MD  11/05/18 0100 Resulting lab: [facility]    Specimen Information    Type Source Collected On   Blood  11/05/18 0540            Vitamin D [[id number]] (Abnormal)  Resulted: 11/04/18 1128, Result status: Final result    Ordering provider:  Steve Finn DO  11/03/18 0904 Resulting lab: [facility]    Specimen Information    Type Source Collected On   Blood  11/03/18 0959          Components       Value Reference Range Flag Lab   Vitamin D Deficiency screening 19 20 - 75 ug/L L 51   Comment:         Season, race, dietary intake, and treatment affect the concentration of   25-hydroxy-Vitamin D. Values may decrease during winter months and increase   during summer months. Values 20-29 ug/L may indicate Vitamin D insufficiency   and values <20 ug/L may indicate Vitamin D deficiency.  Vitamin D determination is routinely performed by an immunoassay specific for   25 hydroxyvitamin D3.  If an individual is on vitamin D2 (ergocalciferol)   supplementation, please specify 25 OH vitamin D2 and D3 level determination by   LCMSMS test VITD23.              Basic metabolic panel [[id number]] (Abnormal)  Resulted: 11/04/18 0838, Result status: Final result    Ordering provider: Steve Finn DO  11/04/18 0100 Resulting lab: [facility]    Specimen Information    Type Source Collected On   Blood  11/04/18 0803          Components       Value Reference Range Flag Lab   Sodium 140 133 - 144 mmol/L  51   Potassium 4.2 3.4 - 5.3 mmol/L  51   Chloride 105 94 - 109 mmol/L  51   Carbon Dioxide 28 20 - 32 mmol/L  51   Anion Gap 7 3 - 14 mmol/L  51   Glucose 133 70 - 99 mg/dL H 51   Urea Nitrogen 22 7 - 30 mg/dL  51   Creatinine 0.48 0.52 - 1.04 mg/dL L 51   GFR Estimate >90 >60 mL/min/1.7m2  51   Comment:  Non  GFR Calc   GFR Estimate If Black >90 >60 mL/min/1.7m2  51   Comment:  African American GFR Calc   Calcium 9.2 8.5 - 10.1 mg/dL  51            Platelet count [[id number]]  Resulted: 11/04/18 0818, Result status: Final result    Ordering provider: Lucia Tubbs MD  11/04/18 0000 Resulting lab: [facility]    Specimen Information    Type Source Collected On    Blood  11/04/18 0803          Components       Value Reference Range Flag Lab   Platelet Count 271 150 - 450 10e9/L  51            Calcium random urine [[id number]]  Resulted: 11/04/18 0322, Result status: Final result    Ordering provider: Shad Zhang MD  11/04/18 0135 Resulting lab: [facility]    Specimen Information    Type Source Collected On     11/04/18 0135          Components       Value Reference Range Flag Lab   Calcium Urine mg/dL 11.5 mg/dL  51   Calcium Urine g/g Cr 0.43 g/g Cr  51            Creatinine urine calculation only [[id number]]  Resulted: 11/04/18 0322, Result status: Final result    Ordering provider: Shad Zhang MD  11/04/18 0135 Resulting lab: [facility]    Specimen Information    Type Source Collected On     11/04/18 0135          Components       Value Reference Range Flag Lab   Creatinine Urine 27 mg/dL  51            Protein electrophoresis random urine [[id number]]  Resulted: 11/04/18 0254, Result status: In process    Ordering provider: Shad Zhang MD  11/04/18 0135 Resulting lab: [facility]    Specimen Information    Type Source Collected On     11/04/18 0135            Protein electrophoresis timed urine [[id number]]  Resulted: 11/04/18 0227, Result status: In process    Ordering provider: Shad Zhang MD  11/04/18 0135 Resulting lab: [facility]    Specimen Information    Type Source Collected On     11/04/18 0135            Protein electrophoresis [[id number]]  Resulted: 11/03/18 1159, Result status: In process    Ordering provider: Steve Finn DO  11/03/18 0904 Resulting lab: [facility]    Specimen Information    Type Source Collected On   Blood  11/03/18 0959          Components       Value Reference Range Flag Lab   Albumin Fraction PENDING 3.7 - 5.1 g/dL     Alpha 1 Fraction PENDING 0.2 - 0.4 g/dL     Alpha 2 Fraction PENDING 0.5 - 0.9 g/dL     Beta Fraction PENDING 0.6 - 1.0 g/dL     Gamma Fraction  PENDING 0.7 - 1.6 g/dL     Monoclonal Peak PENDING 0.0 g/dL     ELP Interpretation: PENDING               Parathyroid Hormone Intact [[id number]]  Resulted: 11/03/18 1050, Result status: Final result    Ordering provider: Steve Finn,   11/03/18 0904 Resulting lab: [facility]    Specimen Information    Type Source Collected On   Blood  11/03/18 0959          Components       Value Reference Range Flag Lab   Parathyroid Hormone Intact 76 18 - 80 pg/mL  51            TSH with free T4 reflex [[id number]]  Resulted: 11/03/18 1040, Result status: Final result    Ordering provider: Steve Finn,   11/03/18 0904 Resulting lab: [facility]    Specimen Information    Type Source Collected On   Blood  11/03/18 0959          Components       Value Reference Range Flag Lab   TSH 2.84 0.40 - 4.00 mU/L  51            Comprehensive metabolic panel [[id number]] (Abnormal)  Resulted: 11/03/18 1034, Result status: Final result    Ordering provider: Steve Finn DO  11/03/18 0904 Resulting lab: [facility]    Specimen Information    Type Source Collected On   Blood  11/03/18 0959          Components       Value Reference Range Flag Lab   Sodium 141 133 - 144 mmol/L  51   Potassium 3.9 3.4 - 5.3 mmol/L  51   Chloride 106 94 - 109 mmol/L  51   Carbon Dioxide 29 20 - 32 mmol/L  51   Anion Gap 6 3 - 14 mmol/L  51   Glucose 86 70 - 99 mg/dL  51   Urea Nitrogen 18 7 - 30 mg/dL  51   Creatinine 0.50 0.52 - 1.04 mg/dL L 51   GFR Estimate >90 >60 mL/min/1.7m2  51   Comment:  Non  GFR Calc   GFR Estimate If Black >90 >60 mL/min/1.7m2  51   Comment:  African American GFR Calc   Calcium 8.7 8.5 - 10.1 mg/dL  51   Bilirubin Total 0.4 0.2 - 1.3 mg/dL  51   Albumin 2.8 3.4 - 5.0 g/dL L 51   Protein Total 6.6 6.8 - 8.8 g/dL L 51   Alkaline Phosphatase 151 40 - 150 U/L H 51   ALT 18 0 - 50 U/L  51   AST 16 0 - 45 U/L  51             Phosphorus [[id number]]  Resulted: 11/03/18 1034, Result status: Final result    Ordering provider: Steve Finn DO  11/03/18 0904 Resulting lab: [facility]    Specimen Information    Type Source Collected On   Blood  11/03/18 0959          Components       Value Reference Range Flag Lab   Phosphorus 3.4 2.5 - 4.5 mg/dL  51            N-Telopeptide Cross-Linked Serum [[id number]]  Resulted: 11/03/18 1023, Result status: In process    Ordering provider: Steve Finn DO  11/03/18 0904 Resulting lab: [facility]    Specimen Information    Type Source Collected On   Blood  11/03/18 0959            Creatinine [[id number]]  Resulted: 11/02/18 0855, Result status: Final result    Ordering provider: Lucia Tubbs MD  11/01/18 2255 Resulting lab: [facility]    Specimen Information    Type Source Collected On   Blood  11/02/18 0747          Components       Value Reference Range Flag Lab   Creatinine 0.58 0.52 - 1.04 mg/dL  51   GFR Estimate >90 >60 mL/min/1.7m2  51   Comment:  Non  GFR Calc   GFR Estimate If Black >90 >60 mL/min/1.7m2  51   Comment:   GFR Calc            Platelet count [[id number]]  Resulted: 11/02/18 0835, Result status: Final result    Ordering provider: Lucia Tubbs MD  11/01/18 2258 Resulting lab: [facility]    Specimen Information    Type Source Collected On   Blood  11/02/18 0747          Components       Value Reference Range Flag Lab   Platelet Count 275 150 - 450 10e9/L  51            Testing Performed By     Lab - Abbreviation Name Director Address Valid Date Range    45 - TV[facility] Unknown Unknown 01/28/02 0000 - Present    51 - Unknown [facility] Unknown [address] [facility] [id number] 12/31/14 1010 - Present            Unresulted Labs (24h ago through future)    Start       Ordered    11/04/18 0600  Platelet count   (Pharmacological Prophylaxis - enoxaparin (LOVENOX) *Use only if creatinine clearance is greater than 30 mL/min)  EVERY THREE DAYS,   Routine     Comments:  Repeat every 3 days while on VTE prophylaxis.  Notify provider and hold enoxaparin if platelet count falls by 50% of baseline. If no result is listed, this lab has not been done the past 365 days. LATEST LAB RESULT: Platelet Count (10e9/L)       Date                     Value                 11/01/2018               226              ----------    11/01/18 2258    11/04/18 0000  Creatinine  (Pharmacological Prophylaxis - enoxaparin (LOVENOX) *Use only if creatinine clearance is greater than 30 mL/min)  EVERY THREE DAYS,   Routine     Comments:  Repeat every 3 days while on VTE prophylaxis.    11/01/18 2258         Imaging Results - 3 Days      XR Pelvis G/E 3 Views [[id number]]  Resulted: 11/03/18 2114, Result status: Final result    Ordering provider: Steve Finn DO  11/03/18 1418 Resulted by: Carmel Kruse MD    Performed: 11/03/18 2053 - 11/03/18 2054 Resulting lab: RADIOLOGY RESULTS    Narrative:       Exam: 3 views of the pelvis dated 11/3/2018.    COMPARISON: CT dated 11/1/2018.    CLINICAL HISTORY: Stability of fractures post weightbearing.    FINDINGS: 3 views of the pelvis were obtained. Partially visualized  right hip hemiarthroplasty. Redemonstration of known fracture  involving the junction of the left acetabulum with the left superior  pubic rami. Mild irregularity at the known left inferior pubic rami  fracture. The known sacral fractures are not well-seen on plain  radiographs. The bones are osteopenic appearing.      Impression:       IMPRESSION:  1. Known fractures involving the left acetabulum at the junction with  the left superior pubic rami and the left inferior pubic rami are  again seen, not significantly changed. The sacral fractures not well  evaluated on plain radiographs.  2. Postsurgical changes of a right hip  hemiarthroplasty.    CONCETTA JERONIMO MD      Testing Performed By     Lab - Abbreviation Name Director Address Valid Date Range    104 - Rad [facility] RADIOLOGY RESULTS Unknown Unknown 02/16/05 1553 - Present            Encounter-Level Documents:     There are no encounter-level documents.      Order-Level Documents:     There are no order-level documents.

## 2018-11-02 ENCOUNTER — APPOINTMENT (OUTPATIENT)
Dept: PHYSICAL THERAPY | Facility: CLINIC | Age: 78
DRG: 543 | End: 2018-11-02
Attending: FAMILY MEDICINE
Payer: MEDICARE

## 2018-11-02 ENCOUNTER — TELEPHONE (OUTPATIENT)
Dept: ORTHOPEDICS | Facility: CLINIC | Age: 78
End: 2018-11-02

## 2018-11-02 LAB
CREAT SERPL-MCNC: 0.58 MG/DL (ref 0.52–1.04)
GFR SERPL CREATININE-BSD FRML MDRD: >90 ML/MIN/1.7M2
PLATELET # BLD AUTO: 275 10E9/L (ref 150–450)

## 2018-11-02 PROCEDURE — 25000132 ZZH RX MED GY IP 250 OP 250 PS 637: Mod: GY | Performed by: STUDENT IN AN ORGANIZED HEALTH CARE EDUCATION/TRAINING PROGRAM

## 2018-11-02 PROCEDURE — 97116 GAIT TRAINING THERAPY: CPT | Mod: GP

## 2018-11-02 PROCEDURE — 36415 COLL VENOUS BLD VENIPUNCTURE: CPT | Performed by: FAMILY MEDICINE

## 2018-11-02 PROCEDURE — 25000128 H RX IP 250 OP 636: Performed by: STUDENT IN AN ORGANIZED HEALTH CARE EDUCATION/TRAINING PROGRAM

## 2018-11-02 PROCEDURE — 97161 PT EVAL LOW COMPLEX 20 MIN: CPT | Mod: GP

## 2018-11-02 PROCEDURE — 25000132 ZZH RX MED GY IP 250 OP 250 PS 637: Mod: GY | Performed by: FAMILY MEDICINE

## 2018-11-02 PROCEDURE — A9270 NON-COVERED ITEM OR SERVICE: HCPCS | Mod: GY | Performed by: STUDENT IN AN ORGANIZED HEALTH CARE EDUCATION/TRAINING PROGRAM

## 2018-11-02 PROCEDURE — 12000008 ZZH R&B INTERMEDIATE UMMC

## 2018-11-02 PROCEDURE — 40000193 ZZH STATISTIC PT WARD VISIT

## 2018-11-02 PROCEDURE — A9270 NON-COVERED ITEM OR SERVICE: HCPCS | Mod: GY | Performed by: FAMILY MEDICINE

## 2018-11-02 PROCEDURE — 97530 THERAPEUTIC ACTIVITIES: CPT | Mod: GP

## 2018-11-02 PROCEDURE — 82565 ASSAY OF CREATININE: CPT | Performed by: FAMILY MEDICINE

## 2018-11-02 PROCEDURE — 85049 AUTOMATED PLATELET COUNT: CPT | Performed by: FAMILY MEDICINE

## 2018-11-02 RX ORDER — HEPARIN SODIUM,PORCINE 10 UNIT/ML
5 VIAL (ML) INTRAVENOUS DAILY
Status: DISCONTINUED | OUTPATIENT
Start: 2018-11-02 | End: 2018-11-05 | Stop reason: HOSPADM

## 2018-11-02 RX ORDER — HEPARIN SODIUM 1000 [USP'U]/ML
5-10 INJECTION, SOLUTION INTRAVENOUS; SUBCUTANEOUS EVERY 24 HOURS
Status: DISCONTINUED | OUTPATIENT
Start: 2018-11-02 | End: 2018-11-02

## 2018-11-02 RX ORDER — LIDOCAINE 4 G/G
2 PATCH TOPICAL
Status: DISCONTINUED | OUTPATIENT
Start: 2018-11-02 | End: 2018-11-05 | Stop reason: HOSPADM

## 2018-11-02 RX ORDER — METHOCARBAMOL 500 MG/1
500 TABLET, FILM COATED ORAL 2 TIMES DAILY PRN
Status: DISCONTINUED | OUTPATIENT
Start: 2018-11-02 | End: 2018-11-03

## 2018-11-02 RX ORDER — POLYETHYLENE GLYCOL 3350 17 G/17G
17 POWDER, FOR SOLUTION ORAL DAILY
Status: DISCONTINUED | OUTPATIENT
Start: 2018-11-02 | End: 2018-11-05 | Stop reason: HOSPADM

## 2018-11-02 RX ORDER — DOCUSATE SODIUM 100 MG/1
100 CAPSULE, LIQUID FILLED ORAL 2 TIMES DAILY
Status: DISCONTINUED | OUTPATIENT
Start: 2018-11-02 | End: 2018-11-05 | Stop reason: HOSPADM

## 2018-11-02 RX ADMIN — TRAMADOL HYDROCHLORIDE 50 MG: 50 TABLET, COATED ORAL at 03:42

## 2018-11-02 RX ADMIN — TRAMADOL HYDROCHLORIDE 50 MG: 50 TABLET, COATED ORAL at 08:30

## 2018-11-02 RX ADMIN — DOCUSATE SODIUM 100 MG: 100 CAPSULE, LIQUID FILLED ORAL at 20:48

## 2018-11-02 RX ADMIN — Medication 2.5 MG: at 22:48

## 2018-11-02 RX ADMIN — METHOCARBAMOL TABLETS 500 MG: 500 TABLET, COATED ORAL at 22:48

## 2018-11-02 RX ADMIN — POLYETHYLENE GLYCOL 3350 17 G: 17 POWDER, FOR SOLUTION ORAL at 10:56

## 2018-11-02 RX ADMIN — FERROUS SULFATE TAB 325 MG (65 MG ELEMENTAL FE) 325 MG: 325 (65 FE) TAB at 08:30

## 2018-11-02 RX ADMIN — METHOCARBAMOL TABLETS 500 MG: 500 TABLET, COATED ORAL at 17:58

## 2018-11-02 RX ADMIN — DOCUSATE SODIUM 100 MG: 100 CAPSULE, LIQUID FILLED ORAL at 10:55

## 2018-11-02 RX ADMIN — METHOCARBAMOL TABLETS 500 MG: 500 TABLET, COATED ORAL at 00:40

## 2018-11-02 RX ADMIN — Medication 5 ML: at 08:30

## 2018-11-02 RX ADMIN — Medication 2.5 MG: at 17:58

## 2018-11-02 RX ADMIN — ACETAMINOPHEN 1000 MG: 500 TABLET, FILM COATED ORAL at 08:30

## 2018-11-02 RX ADMIN — ACETAMINOPHEN 1000 MG: 500 TABLET, FILM COATED ORAL at 20:48

## 2018-11-02 RX ADMIN — LIDOCAINE 2 PATCH: 560 PATCH PERCUTANEOUS; TOPICAL; TRANSDERMAL at 10:55

## 2018-11-02 RX ADMIN — ACETAMINOPHEN 1000 MG: 500 TABLET, FILM COATED ORAL at 13:17

## 2018-11-02 RX ADMIN — ENOXAPARIN SODIUM 40 MG: 40 INJECTION SUBCUTANEOUS at 22:47

## 2018-11-02 RX ADMIN — Medication 2.5 MG: at 13:17

## 2018-11-02 ASSESSMENT — ACTIVITIES OF DAILY LIVING (ADL)
ADLS_ACUITY_SCORE: 20
ADLS_ACUITY_SCORE: 13
ADLS_ACUITY_SCORE: 20
ADLS_ACUITY_SCORE: 20

## 2018-11-02 ASSESSMENT — PAIN DESCRIPTION - DESCRIPTORS: DESCRIPTORS: ACHING;SORE

## 2018-11-02 NOTE — CONSULTS
Social Work Services Progress Note    Hospital Day: 1  Date of Initial Social Work Evaluation:  Not Completed   Collaborated with:  Pt, pt's sister Sahil ([phone]), Primary Team and [facility]    Data:  SW consulted for discharge planning. Pt presented, from home, with hip pain after sustaining fall a few days ago. Pt found to have minimally displaced left acetabular fracture, inferior pubic ramus, left sacral and right sacral fractures. Ortho is recommending non-operative management. Pt w/ hx of right hip fracture 7/2018. Pt has been in and out of [facility] since April of 2018 and wants to return again after this admission. Pt most recently discharged from [facility], 9/18. It is suspected that pt no longer has Medicare days, however [facility] unable to confirm due to time of day. Will to accept after admissions has had a chance to talk to their business office  Would also need to confirm exact discharge date because if pt has been out for 60 days Medicare days would re-start.    Pt deferred to her sister, Sahil, for discharge planning. Writer called Sahil and discussed return to [facility] and she is in agreement. Discussed pt's Medicare days and sister reports pt had been paying privately towards the end of her stay. Dtr reports there are no financial concerns if pt has no Medicare days and needs to pay privately.     Discussed w/ primary team and anticipated that pt will remain inpatient over the weekend for pain control with anticipated discharge date of Monday.    Intervention:  Discharge Planning    Assessment:  Pt deferred to her sister for discharge planning. Pt and sister's preference is for pt to return to the [facility]. Facility has received referral and clinically can accept, however admissions needs to confirm Medicare days and financial information with their business office and this cannot happen before Monday.     Plan:     Anticipated Disposition:  Facility:  [facility]    Barriers to d/c plan:  None anticipated after admissions speaks to their business office on Monday    Follow Up:  SW to follow-up on Monday.

## 2018-11-02 NOTE — PLAN OF CARE
"Problem: Patient Care Overview  Goal: Plan of Care/Patient Progress Review  Outcome: No Change  /74 (BP Location: Right arm)  Pulse 77  Temp 96.5  F (35.8  C) (Axillary)  Resp 16  Ht 1.626 m (5' 4\")  Wt 54.4 kg (120 lb)  LMP  (LMP Unknown)  SpO2 96%  BMI 20.6 kg/m2    Neuro: Pt is A&O x4, forgetful at times. CMS, pulses intact.   Cardiac: WDL  Raspiratory: LS clear   GI/: Pt has +BS, +flatus, had a large BM, abd is soft/non-tender. Pt is has AUOP, can be incontinent at times, uses bed pan, brief and barrier cream used w/ incont cares.  Skin: Pt has vaginal and perineum redness, blanchable, tender. Pt has old scar on R hip from previous surgery. Pt has reddnes on bilat LE's, wearing long pink leg socks from home. Pt has lidocaine patches on L groin and thigh.  Pain: Pt's c/o of severe muscle spasms in L groin. PRN tramadol given x1 w/ relief. Pts orders changed and have oxycodone available for pain, PRN oxy given w/ scheduled tylenol before working w/ therapy. Lidocaine patches in place on L side.  LDA: Pt has a R chest port-a-cath, hep locked, dressing is C/D/I.  Activity: Pt repositions in bed, turns on side. Pt worked w/ PT, has a pillow in between legs. Encourage pt to squeeze knees when spasms occur. May work w/ OT this afternoon.   Diet/Appetite: Pt is on a reg diet, tolerating well, no N/V, good appetite.   Plan: May get an xray this afternoon. Will continue w/ POC.       "

## 2018-11-02 NOTE — TELEPHONE ENCOUNTER
Received message from ED:    Patient to follow-up in orthopedic out-patient clinic in 2 weeks with Dr. Sujit Phan for Left LC1 pelvic ring injury with repeat xrays of AP pelvis with inlet and outlet views.              Appt scheduled with Dr. Phan on 11/16 at 2pm. Patient is currently inpatient so she will receive appts at discharge.

## 2018-11-02 NOTE — PLAN OF CARE
Problem: Patient Care Overview  Goal: Plan of Care/Patient Progress Review  Outcome: No Change  2100 - 2330: VSS on RA.   Neuro: A&O x4. Afebrile.   GI/: Incontient at times of urine and stool. Active BS, passing gas, 1 small BM. Incontinence brief in place. Pt reports having a vaginal infection and experiences pain during elfego-cares.   Diet: Tolerating regular diet. Ate 2 popsicles since arriving on unit.   Incisions/Drains: R chest port accessed today. Saline locked. No PIVs or drains.  Activity: Pt experiences pain w/ movement. Able to lift hips to use bedpan. Turns and repositions in bed independently. Assist of 1-2. Pt did not get out of bed yet.   Pain: L hip pain reported.PRN Tramadol available.  Plan: Monitor pain. Pt requests that we warn her before we touch her so she can brace herself for pain. Continue to monitor.

## 2018-11-02 NOTE — PLAN OF CARE
"Problem: Patient Care Overview  Goal: Plan of Care/Patient Progress Review  Discharge Planner PT   Patient plan for discharge: TCU  Current status: PT: Eal completed, treat initiated. Pt with spasm in L hip adductors, responded well to education of keeping pillow between knees, squeezing knees gently together to minimize spasm. Pt able to roll to L and sup<>sit mod A with some spasm, pt also tends to then significantly tense entire LE and hold it up, decreased with intentional LE add. Pt tolerated sitting EOB 12+ minutes and stood light mod A. Pt's pain less with gentle WB on L LE with use of walker and inc R LE WB. Edu and slow step by step and pt able to \"heel to toe\" move laterally moving B LEs in conjunction with one another, at end pt able to take single side step L LE and then R LE with PWB on L throughout. Pt seated and mod A sit>supine into side then hooklying, mod A to scoot over in bed but with edu on add with pillow again pt able to gently bridge and scoot over some CGA as well.  Barriers to return to prior living situation: medical status, mm spasms, functional moblity  Recommendations for discharge: TCU  Rationale for recommendations: based on functional assessment       Entered by: Stacey Alexis 11/02/2018 5:04 PM           "

## 2018-11-02 NOTE — PLAN OF CARE
Problem: Patient Care Overview  Goal: Plan of Care/Patient Progress Review  OT/7B - Cancel. Pt working with PT this afternoon, limited by spasms/pain. Will reschedule OT for 11/3 as appropriate.

## 2018-11-02 NOTE — CONSULTS
[facility]  ORTHOPAEDIC SURGERY CONSULT - HISTORY AND PHYSICAL    DATE OF CONSULT: 11/1/2018 9:28 PM    REQUESTING PROVIDER: [facility] ED Staff.    CC: Left hip pain    DATE OF INJURY: 10/23/18    HISTORY OF PRESENT ILLNESS:   Elizabeth Taylor is a 78 year old female PMH SCC anal cancer s/p chemo and radiation therapy and prior right femoral neck fx s/p hemiarthroplasty (DOS: 7/27/18) presenting to the ED with new onset left hip pain. Patient reports her left hip pain initially began with increased physical activity after recovery from her right hip hemiarthroplasty. Patient reports she had been drastically increasing her activity with exercises and increased walking without assistive devices. This hip pain was then exacerbated 9 days ago after a mild fall while attempting to sit on her commode. Patient notes that she had been able to ambulate since this episode however her left hip pain progressed until it became too difficult for her to ambulate in the last day. Patient denies any pain in her right hip at this time.    Denies numbness, tingling, or weakness to the affected extremities.  Denies fevers, chills, nausea, vomiting, diarrhea, constipation, chest pain, shortness of breath.    PAST MEDICAL HISTORY:   Past Medical History:   Diagnosis Date     Anal cancer (H)      Endometriosis, site unspecified      Thyroiditis, unspecified     Thryoiditis-resolved     [Patient denies any personal history of bleeding disorders, clotting disorders, or adverse reactions to anesthesia].    PAST SURGICAL HISTORY:    Past Surgical History:   Procedure Laterality Date     APPENDECTOMY      as a child     ARTHROPLASTY HIP Right 7/26/2018    Procedure: ARTHROPLASTY HIP;  Right Hip Hemiarthroplasty;  Surgeon: Zaire Phan MD;  Location: [facility] OR     COLONOSCOPY N/A 4/13/2017    Procedure: COLONOSCOPY;  Surgeon: Juan Dos Santos MD;  Location: [facility] GI     INSERT PORT VASCULAR ACCESS Right 2/8/2018     Procedure: INSERT PORT VASCULAR ACCESS;  Place Single Lumen Venous Chest Port Placement;  Surgeon: Zaire Moreira PA-C;  Location: [facility] OR     SURGICAL HISTORY OF -       endometriosis       MEDICATIONS:   Prior to Admission medications    Medication Sig Last Dose Taking? Auth Provider   acetaminophen (TYLENOL) 500 MG tablet Take 1,000 mg by mouth 3 times daily 11/1/2018 at AM Yes Unknown, Entered By History   calcium-vitamin D (CALTRATE) 600-400 MG-UNIT per tablet Take 1 tablet by mouth daily Past Week at Unknown time Yes Brenda Gurrola PA-C   ferrous sulfate (IRON) 325 (65 Fe) MG tablet Take 325 mg by mouth daily (with breakfast) Past Week at Unknown time Yes Unknown, Entered By History   simethicone (MYLICON) 40 MG/0.6ML suspension Take 0.6 mLs (40 mg) by mouth every 6 hours as needed for cramping Past Week at Unknown time Yes Brenda Gurrola PA-C   traMADol (ULTRAM) 50 MG tablet Take 1 tablet (50 mg) by mouth every 6 hours as needed for severe pain 11/1/2018 x2 at AM Yes Joanie Gomez APRN CNP   vitamin B complex with vitamin C (VITAMIN  B COMPLEX) TABS tablet Take 1 tablet by mouth daily Past Week at Unknown time Yes Reported, Patient   VITAMIN E PO Take 1 capsule by mouth daily Past Week at Unknown time Yes Unknown, Entered By History       ALLERGIES:   Darvon [propoxyphene]; Penicillins; and Ketoconazole    SOCIAL HISTORY:   Social History     Social History     Marital status: Single     Spouse name: N/A     Number of children: N/A     Years of education: N/A     Occupational History     Not on file.     Social History Main Topics     Smoking status: Never Smoker     Smokeless tobacco: Never Used     Alcohol use No     Drug use: No     Sexual activity: No     Other Topics Concern     Not on file     Social History Narrative     Living situation: Patient lives in a apartment at this time with family. Home has stairs.  Tobacco: Denies  Alcohol: Denies  Illicit Drugs: Denies    FAMILY  HISTORY:  Family History   Problem Relation Age of Onset     Cancer Sister      Cancer Maternal Grandmother      lymphoma     HEART DISEASE Father      MI     Uterine Cancer Niece        Patient denies known family history of bleeding, clotting, or anesthesia related complications.     REVIEW OF SYSTEMS:   Positive per HPI. Otherwise a 10-point reviews of systems was negative except as noted above in the HPI.   Patient denies any new or recent: fevers, chills, rashes, headache, vision changes, hearing changes, sinus pain, sore throat, neck pain, swollen glands, cough, sob, chest pain/pressure, abdominal pain, nausea, vomiting, diarrhea, constipation, dysuria, hematuria, leg swelling, myalgias, numbness or tingling.    PHYSICAL EXAM:   Vitals:    11/01/18 1722 11/01/18 1723 11/01/18 1725 11/01/18 1726   BP:       Pulse:       Resp:       Temp:       TempSrc:       SpO2: 92% 96% 96% 100%   Weight:       Height:         General: Awake, alert, appropriate, following commands, NAD.  Neuro: CN II-XII grossly intact.   Skin: No rashes,  skin color normal.  HEENT: Normal.   Lungs: Breathing comfortably and nonlabored, no wheezes or stridor noted.  Heart/Cardiovascular: Regular pulse, no peripheral cyanosis.  Abdomen: Soft, non-tender, non-distended.   Pelvis: Stable to AP and Lateral compression. Mild TTP over anterior pelvis and sacrum.  Right Lower Extremity: No deformity, skin intact. No significant tenderness to palpation over thigh, knee, leg, ankle/foot. No pain with ROM hip/knee/ankle. Motor intact distally TA/GSC/EHL/FHL with 5/5 strength. SILT sp/dp/tibial/saph/sural nerves. DP/PT pulses palpable, 2+, toes warm and well perfused.   Left Lower Extremity: No deformity, skin intact. No shortening or external rotation. No significant tenderness to palpation over thigh, knee, leg, ankle/foot. No pain with logroll or heel strike. No pain with ROM ankle. Motor intact distally TA/GSC/EHL/FHL with 5/5 strength. Unable to  SLR due to pain. SILT sp/dp/tibial/saph/sural nerves. DP/PT pulses palpable, toes warm and well perfused.     LABS:  Hemoglobin   Date Value Ref Range Status   2018 11.2 (L) 11.7 - 15.7 g/dL Final   2018 10.0 (L) 11.7 - 15.7 g/dL Final     WBC   Date Value Ref Range Status   2018 5.3 4.0 - 11.0 10e9/L Final     Platelet Count   Date Value Ref Range Status   2018 226 150 - 450 10e9/L Final     INR   Date Value Ref Range Status   2018 1.05 0.86 - 1.14 Final     Creatinine   Date Value Ref Range Status   2018 0.48 (L) 0.52 - 1.04 mg/dL Final     Glucose   Date Value Ref Range Status   2018 80 70 - 99 mg/dL Final       IMAGING:  Xray AP Pelvis and AP/Lateral Left hip: discontinuity of left iliopectineal line. No other obvious fractures or dislocations.  CT Pelvis: left anterior column fracture with fracture extension into the superior pubic root that does not involve the weight bearing dome. Left inferior pubic ramus fracture, minimally displaced. Left sacral compression fracture.    IMPRESSION:   Elizabeth Taylor is a 78 year old F PMH SCC anal cancer s/p chemo and radiation therapy and prior right femoral neck fx s/p hemiarthroplasty with the followin. Left LC1 pelvic ring fracture    RECOMMENDATIONS:   - Admit to Medicine.  - Plan for non-operative management with pain control and progressive mobilization.  - Anticoagulation/DVT Prophylaxis: per primary  - X-rays/Imaging: recommend xray AP Pelvis with inlet and outlet views after progressive mobilization and weight bearing with PTOT to assess for fracture displacement. No further advanced imaging needed at this time.  - Activity: up with assist  - Weight bearing: WBAT b/l LE  - Pain control: per primary  - Diet: regular  - Follow-up: follow-up Dr. Phan in out-patient orthopedic clinic in 2 weeks, appointment requested  - Disposition: per primary, pending pain control, PTOT and repeat xrays demonstrating stable  fracture alignment.   Orthopedics to re-evaluate patient after mobilization with therapy.    Assessment and Plan discussed with Dr. Phan, Orthopaedic Surgery.     Arnold Brown MD 11/1/2018 9:28 PM  Orthopaedic Surgery Resident, PGY-1  Pager: [phone]    For questions about this patient, please contact me at my pager.

## 2018-11-02 NOTE — PROGRESS NOTES
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     # Left hip pain  # Osteoporosis w/ current fracture  # Left LC1 pelvic ring fracture  Etiology of fracture likely multifactorial; most likely 2/2 patient's significant radiation history and osteoporosis, as well as her relatively recent right hip hemiarthroplasty and excess stress on the left leg for approximately four months. Ortho saw the patient and recommended non operative management. She will be managed conservatively focusing on adequate pain control and PT/OT. Notably, patient has not had a formal workup for osteoporosis in the past, labs obtained. We will schedule DEXA as outpatient, PCP follow up on discharge.   - Follow up osteoporosis lab workup  - DEXA, PCP follow up as outpatient  - Ortho consulted, following  - Discontinue tramadol  - Oxycodone 2.5mg q4h  - Robaxin 500 mg BID PRN for muscle spasms  - Zofran PRN for nausea  - PT/OT consulted, appreciate recs  - Social work consult for care coordination and discharge planning, appreciate recs     # Anemia  Stable. Takes iron at home.  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 Not currently on chemotherapy.     # S/p Right hip hemiarthroplasty 7/26/18  - PT/OT as above    # Pain Assessment:  Current Pain Score 11/2/2018   Patient currently in pain? yes   Pain score (0-10) -   Pain location Groin   Pain descriptors -   - Elizabeth is experiencing pain due to pelvic fracture. Pain management was discussed and the plan was created in a collaborative  yordan Johnson's response to the current recommendations: engaged  - Please see the plan for pain management as documented above      Diet: Regular Diet Adult  Fluids: Regular diet as above  DVT Prophylaxis: Enoxaparin (Lovenox) SQ  Code Status: Full Code    Disposition Plan   Expected discharge: 2 - 3 days, recommended to transitional care unit once adequate pain management/ tolerating PO medications. Dispo:       Entered: Venita Newton 11/02/2018, 8:50 AM   Information in the above section will display in the discharge planner report.      The patient's care was discussed with the Attending Physician, Dr. Chew and Family Medicine Team.    Venita Newton, MS4  [facility]  Pager: [phone]  Please see sticky note for cross cover information    I was present with the medical student who participated in the service and in the documentation of this note. I have verified the history and personally performed the physical exam and medical decision making, and have verified the content of the note, which accurately reflects my assessment of the patient and the plan of care.   MD Jerrell Zaragoza MD  Chief Resident  [facility]  [phone]    Interval History   RN notes and vitals reviewed. Patient's pain not well controlled overnight with Tramadol and Robaxin. Reported spasms and difficulty with using the commode. Ortho saw the patient yesterday and recommended non operative management, PT/OT, f/u xrays, weight bearing as tolerated, and ortho follow up in 2-4 weeks.     Of note, patient has not had a DEXA scan in the past or formal osteoporosis workup or treatment.       Physical Exam   Vital Signs: Temp: 97.6  F (36.4  C) Temp src: Oral BP: 124/74 Pulse: 74   Resp: 16 SpO2: 96 % O2 Device: None (Room air)    Weight: 120 lbs 0 oz  GENERAL: Thin appearing, eating in bed, NAD  SKIN: Warm, dry, no rashes  HEENT:  Head: Normocephalic,  atraumatic  Eyes: Conjunctiva clear, sclera non-icteric, EOM intact  Ears: Hearing grossly intact  Nose: No external lesions  Mouth: Mucous membranes moist  CARDIO: RRR, no m/r/g  PULM: CTAB, no wheezes or crackles  GI: Soft, nontender, nondistended, normal bowel sounds, no masses  MSK: Bilateral lower extremity edema L>R, bilateral lower extremity erythema and crusted scaling to dorsum of feet,   Bilateral 2+ DP pulses, full ROM of ankle and toes, lower extremities warm, sensation intact bilaterally, pain on pelvic stability testing, hip internal/external rotation; exam limited by pain  Neurologic: Alert and oriented x3      Data     Recent Labs  Lab 11/02/18  0747 11/01/18  1526   WBC  --  5.3   HGB  --  11.2*   MCV  --  89    226   NA  --  139   POTASSIUM  --  3.9   CHLORIDE  --  103   CO2  --  30   BUN  --  15   CR  --  0.48*   ANIONGAP  --  6   ELISE  --  8.9   GLC  --  80       Recent Results (from the past 24 hour(s))   XR Pelvis and Hip Left 2 Views    Narrative    Exam: XR PELVIS AND HIP LEFT 2 VIEWS, 11/1/2018 3:18 PM    Indication: severe spasm and left hip pain, no fall.;     Comparison: X-ray 10/16/2018    Findings:   AP view of pelvis and 2 views left hip.    Iliopectineal line on left is discontinuous, suspicious for fracture.    Osteopenic appearance of bone. Right hip arthroplasty. Degenerative  changes of the lumbar spine. Soft tissue unremarkable.      Impression    Impression:   Iliopectineal line on left is discontinuous, suspicious for fracture.  Consider cross-sectional images for confirmation.    SHASTA AMAYA MD   CT Pelvis Bone wo Contrast    Narrative    CT pelvis without contrast    History: Left hip pain with acetabular irregularity.    Techniques: Helical acquisition of images through the pelvis was  obtained without administration of contrast.    DLP: 1423 mGy-cm    Comparison: Radiograph from the same day. PET/CT from May 25, 2018.    Findings:    There is minimally displaced  fracture involving the anterior wall and  column of the left acetabulum extending into the superior pubic ramus.  Additionally, there is minimally displaced fracture of the left  inferior pubic ramus.    Additionally, essentially nondisplaced fracture of the left sacrum is  also present. There is also likely nondisplaced fracture of right  sacrum indicated by subtle cortical buckling. These are likely  insufficiency fracture.    Bones appear diffusely osteopenic. Degenerative changes of the  visualized lower lumbar spine, particularly notable facet  arthropathies. Trace anterolisthesis of L4 on L5. Hypertrophic changes  of spinous processes.    Postsurgical change of right hip arthroplasty with beam hardening  artifact partially compromising assessment. Mild degenerative change  of left hip with focal lucency in the medial aspect of left femoral  neck, may be intraosseous lipoma.    Degenerative changes of bilateral sacroiliac joints. Pelvic  enthesopathy.    Partially visualized gallbladder demonstrates cholelithiasis. Colonic  diverticulosis. Trace free fluid in the pelvis, nonspecific. Mild  subcutaneous soft tissue strandings.      Impression    IMPRESSION:  1. Minimally displaced left acetabular fracture including anterior  wall and column with extension into the superior pubic ramus.  2. Minimally displaced left inferior pubic ramus fracture.  3. Minimally displaced left sacral and likely right sacral fractures.  4. Osteopenia.  5. Cholelithiasis.    MELVI HORTENCIA     Medications       acetaminophen  1,000 mg Oral TID     enoxaparin  40 mg Subcutaneous Q24H     ferrous sulfate  325 mg Oral Daily with breakfast     heparin lock flush  5 mL Intracatheter Daily

## 2018-11-02 NOTE — PLAN OF CARE
Problem: Patient Care Overview  Goal: Individualization & Mutuality  Outcome: No Change  Vitals:    11/01/18 1726 11/01/18 2127 11/02/18 0001 11/02/18 0338   BP:  120/65 135/65 124/67   BP Location:   Right arm Right arm   Pulse:  65 82 71   Resp:  16 16 16   Temp:  96.8  F (36  C) 96.6  F (35.9  C) 97.4  F (36.3  C)   TempSrc:  Oral Oral Oral   SpO2: 100% 98% 94% 93%   Weight:       Height:         Activity: Repositions in bed independently.   Neuros: A & O x4. Forgetful.  Cardiac: WDL.   Respiratory: LS clear.  GI/: BS+, passing flatus, had small formed brown BM. Voiding frequently clear yellow urine via bedpan.   Diet: Tolerating regular diet.  Skin: Vaginal area and perineum reddened and tender.  Lines: Portacath hep locked.  Pain/nausea: C/o spasms in left groin, yells out if repositioning too fast, notified provider who ordered PRN Robaxin BID, also administered PRN Tramadol x1.  Plan: Continue POC.

## 2018-11-03 ENCOUNTER — APPOINTMENT (OUTPATIENT)
Dept: GENERAL RADIOLOGY | Facility: CLINIC | Age: 78
DRG: 543 | End: 2018-11-03
Payer: MEDICARE

## 2018-11-03 ENCOUNTER — APPOINTMENT (OUTPATIENT)
Dept: OCCUPATIONAL THERAPY | Facility: CLINIC | Age: 78
DRG: 543 | End: 2018-11-03
Attending: FAMILY MEDICINE
Payer: MEDICARE

## 2018-11-03 LAB
ALBUMIN SERPL-MCNC: 2.8 G/DL (ref 3.4–5)
ALP SERPL-CCNC: 151 U/L (ref 40–150)
ALT SERPL W P-5'-P-CCNC: 18 U/L (ref 0–50)
ANION GAP SERPL CALCULATED.3IONS-SCNC: 6 MMOL/L (ref 3–14)
AST SERPL W P-5'-P-CCNC: 16 U/L (ref 0–45)
BILIRUB SERPL-MCNC: 0.4 MG/DL (ref 0.2–1.3)
BUN SERPL-MCNC: 18 MG/DL (ref 7–30)
CALCIUM SERPL-MCNC: 8.7 MG/DL (ref 8.5–10.1)
CHLORIDE SERPL-SCNC: 106 MMOL/L (ref 94–109)
CO2 SERPL-SCNC: 29 MMOL/L (ref 20–32)
CREAT SERPL-MCNC: 0.5 MG/DL (ref 0.52–1.04)
GFR SERPL CREATININE-BSD FRML MDRD: >90 ML/MIN/1.7M2
GLUCOSE SERPL-MCNC: 86 MG/DL (ref 70–99)
PHOSPHATE SERPL-MCNC: 3.4 MG/DL (ref 2.5–4.5)
POTASSIUM SERPL-SCNC: 3.9 MMOL/L (ref 3.4–5.3)
PROT SERPL-MCNC: 6.6 G/DL (ref 6.8–8.8)
PTH-INTACT SERPL-MCNC: 76 PG/ML (ref 18–80)
SODIUM SERPL-SCNC: 141 MMOL/L (ref 133–144)
TSH SERPL DL<=0.005 MIU/L-ACNC: 2.84 MU/L (ref 0.4–4)

## 2018-11-03 PROCEDURE — 72190 X-RAY EXAM OF PELVIS: CPT

## 2018-11-03 PROCEDURE — A9270 NON-COVERED ITEM OR SERVICE: HCPCS | Mod: GY | Performed by: STUDENT IN AN ORGANIZED HEALTH CARE EDUCATION/TRAINING PROGRAM

## 2018-11-03 PROCEDURE — 84443 ASSAY THYROID STIM HORMONE: CPT | Performed by: STUDENT IN AN ORGANIZED HEALTH CARE EDUCATION/TRAINING PROGRAM

## 2018-11-03 PROCEDURE — 84100 ASSAY OF PHOSPHORUS: CPT | Performed by: STUDENT IN AN ORGANIZED HEALTH CARE EDUCATION/TRAINING PROGRAM

## 2018-11-03 PROCEDURE — 00000402 ZZHCL STATISTIC TOTAL PROTEIN: Performed by: STUDENT IN AN ORGANIZED HEALTH CARE EDUCATION/TRAINING PROGRAM

## 2018-11-03 PROCEDURE — 80053 COMPREHEN METABOLIC PANEL: CPT | Performed by: STUDENT IN AN ORGANIZED HEALTH CARE EDUCATION/TRAINING PROGRAM

## 2018-11-03 PROCEDURE — 25000128 H RX IP 250 OP 636: Performed by: STUDENT IN AN ORGANIZED HEALTH CARE EDUCATION/TRAINING PROGRAM

## 2018-11-03 PROCEDURE — 25000132 ZZH RX MED GY IP 250 OP 250 PS 637: Mod: GY | Performed by: STUDENT IN AN ORGANIZED HEALTH CARE EDUCATION/TRAINING PROGRAM

## 2018-11-03 PROCEDURE — 36415 COLL VENOUS BLD VENIPUNCTURE: CPT | Performed by: STUDENT IN AN ORGANIZED HEALTH CARE EDUCATION/TRAINING PROGRAM

## 2018-11-03 PROCEDURE — 25000132 ZZH RX MED GY IP 250 OP 250 PS 637: Mod: GY | Performed by: FAMILY MEDICINE

## 2018-11-03 PROCEDURE — 97530 THERAPEUTIC ACTIVITIES: CPT | Mod: GO

## 2018-11-03 PROCEDURE — 97166 OT EVAL MOD COMPLEX 45 MIN: CPT | Mod: GO

## 2018-11-03 PROCEDURE — A9270 NON-COVERED ITEM OR SERVICE: HCPCS | Mod: GY | Performed by: FAMILY MEDICINE

## 2018-11-03 PROCEDURE — 12000008 ZZH R&B INTERMEDIATE UMMC

## 2018-11-03 PROCEDURE — 84165 PROTEIN E-PHORESIS SERUM: CPT | Performed by: STUDENT IN AN ORGANIZED HEALTH CARE EDUCATION/TRAINING PROGRAM

## 2018-11-03 PROCEDURE — 82306 VITAMIN D 25 HYDROXY: CPT | Performed by: STUDENT IN AN ORGANIZED HEALTH CARE EDUCATION/TRAINING PROGRAM

## 2018-11-03 PROCEDURE — 97535 SELF CARE MNGMENT TRAINING: CPT | Mod: GO

## 2018-11-03 PROCEDURE — 83970 ASSAY OF PARATHORMONE: CPT | Performed by: STUDENT IN AN ORGANIZED HEALTH CARE EDUCATION/TRAINING PROGRAM

## 2018-11-03 PROCEDURE — 82523 COLLAGEN CROSSLINKS: CPT | Performed by: STUDENT IN AN ORGANIZED HEALTH CARE EDUCATION/TRAINING PROGRAM

## 2018-11-03 PROCEDURE — 40000133 ZZH STATISTIC OT WARD VISIT

## 2018-11-03 RX ORDER — CALCIUM CARBONATE 500 MG/1
500 TABLET, CHEWABLE ORAL 3 TIMES DAILY
Status: DISCONTINUED | OUTPATIENT
Start: 2018-11-03 | End: 2018-11-05 | Stop reason: HOSPADM

## 2018-11-03 RX ORDER — METHOCARBAMOL 500 MG/1
500 TABLET, FILM COATED ORAL EVERY 6 HOURS PRN
Status: DISCONTINUED | OUTPATIENT
Start: 2018-11-03 | End: 2018-11-05 | Stop reason: HOSPADM

## 2018-11-03 RX ADMIN — LIDOCAINE 2 PATCH: 560 PATCH PERCUTANEOUS; TOPICAL; TRANSDERMAL at 07:39

## 2018-11-03 RX ADMIN — ACETAMINOPHEN 1000 MG: 500 TABLET, FILM COATED ORAL at 15:00

## 2018-11-03 RX ADMIN — DOCUSATE SODIUM 100 MG: 100 CAPSULE, LIQUID FILLED ORAL at 07:39

## 2018-11-03 RX ADMIN — ACETAMINOPHEN 1000 MG: 500 TABLET, FILM COATED ORAL at 07:43

## 2018-11-03 RX ADMIN — METHOCARBAMOL TABLETS 500 MG: 500 TABLET, COATED ORAL at 06:06

## 2018-11-03 RX ADMIN — Medication 2.5 MG: at 09:01

## 2018-11-03 RX ADMIN — Medication 2.5 MG: at 03:23

## 2018-11-03 RX ADMIN — Medication 2.5 MG: at 20:14

## 2018-11-03 RX ADMIN — FERROUS SULFATE TAB 325 MG (65 MG ELEMENTAL FE) 325 MG: 325 (65 FE) TAB at 07:39

## 2018-11-03 RX ADMIN — CALCIUM CARBONATE (ANTACID) CHEW TAB 500 MG 500 MG: 500 CHEW TAB at 15:00

## 2018-11-03 RX ADMIN — Medication 5 ML: at 07:39

## 2018-11-03 RX ADMIN — Medication 2.5 MG: at 15:00

## 2018-11-03 RX ADMIN — CALCIUM CARBONATE (ANTACID) CHEW TAB 500 MG 500 MG: 500 CHEW TAB at 10:45

## 2018-11-03 RX ADMIN — VITAMIN D, TAB 1000IU (100/BT) 1000 UNITS: 25 TAB at 10:45

## 2018-11-03 RX ADMIN — ACETAMINOPHEN 1000 MG: 500 TABLET, FILM COATED ORAL at 20:14

## 2018-11-03 RX ADMIN — METHOCARBAMOL 500 MG: 500 TABLET, FILM COATED ORAL at 12:30

## 2018-11-03 RX ADMIN — METHOCARBAMOL 500 MG: 500 TABLET, FILM COATED ORAL at 23:13

## 2018-11-03 RX ADMIN — DOCUSATE SODIUM 100 MG: 100 CAPSULE, LIQUID FILLED ORAL at 20:14

## 2018-11-03 RX ADMIN — CALCIUM CARBONATE (ANTACID) CHEW TAB 500 MG 500 MG: 500 CHEW TAB at 20:15

## 2018-11-03 ASSESSMENT — ACTIVITIES OF DAILY LIVING (ADL)
ADLS_ACUITY_SCORE: 20
ADLS_ACUITY_SCORE: 17
ADLS_ACUITY_SCORE: 20
ADLS_ACUITY_SCORE: 17
ADLS_ACUITY_SCORE: 20
PREVIOUS_RESPONSIBILITIES: MEAL PREP;HOUSEKEEPING;LAUNDRY;SHOPPING;MEDICATION MANAGEMENT
ADLS_ACUITY_SCORE: 17

## 2018-11-03 ASSESSMENT — PAIN DESCRIPTION - DESCRIPTORS: DESCRIPTORS: SHARP;SPASM

## 2018-11-03 NOTE — PLAN OF CARE
Problem: Patient Care Overview  Goal: Plan of Care/Patient Progress Review  Outcome: No Change  VSS. Ex elevated temp, scheduled tylenol. A&O x4.   GI/: Pt voiding by bedpan, 1 BM  Diet: tolerating regular diet  Incisions/Drains: none  IV: R ch port hep locked.  Activity: up to chair SBA walker with OT WBAT  PRN meds: oxy, robaxin and scheduled tylenol. Lido patch in place. 1 patch fell off due to incont on L hip in afternoon  Pelvic XR scheduled, no call from XR yet.

## 2018-11-03 NOTE — PROGRESS NOTES
11/03/18 0900   Quick Adds   Type of Visit Initial Occupational Therapy Evaluation   Living Environment   Lives With sibling(s)   Living Arrangements house   Home Accessibility stairs within home;stairs to enter home   Number of Stairs to Enter Home 2   Number of Stairs Within Home 16   Stair Railings at Home inside, present on right side;inside, present at both sides   Transportation Available family or friend will provide   Living Environment Comment Pt living with sister and brother in law, she enters from the mid level from the garage, she has 8 steps up or down to bedroom, down and up to kitchen.   Self-Care   Dominant Hand right   Usual Activity Tolerance moderate   Current Activity Tolerance poor   Regular Exercise yes   Activity/Exercise Type strength training;walking   Exercise Amount/Frequency 5-7 times/wk   Equipment Currently Used at Home walker, rolling   Activity/Exercise/Self-Care Comment Pt previously lives in 1 bedroom apartment, with stairs, however has been staying with her sister and brother-in-law since March of 2018.   Functional Level Prior   Ambulation 1-->assistive equipment   Transferring 1-->assistive equipment   Toileting 0-->independent   Bathing 0-->independent   Dressing 0-->independent   Eating 0-->independent   Communication 0-->understands/communicates without difficulty   Swallowing 0-->swallows foods/liquids without difficulty   Cognition 0 - no cognition issues reported   Fall history within last six months yes   Number of times patient has fallen within last six months 4   Which of the above functional risks had a recent onset or change? transferring;ambulation;cognition   Prior Functional Level Comment Pt was independent with walker at home, however reports was independent with ADLS.  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   Precautions/Limitations fall precautions;right hip precautions   Weight-Bearing Status - LUE full weight-bearing   Weight-Bearing Status - RUE full weight-bearing   Weight-Bearing Status - LLE weight-bearing as tolerated   Weight-Bearing Status - RLE full weight-bearing   General Info Comments Pt with total hip replacement on R side in July.   Pt currently living with sister, however has apt as well.  Pt finished with chemo for anal cancer, however falls, have delayed recovery.   Cognitive Status Examination   Orientation orientation to person, place and time   Level of Consciousness alert   Able to Follow Commands mild impairment   Personal Safety (Cognitive) fully aware of deficits   Memory impaired   Attention Sustained attention impaired;Divided attention impaired, difficulty with simultaneous tasks   Organization/Problem Solving Reports problems with organization;Prioritizing of information/tasks impaired;Problem solving impaired   Executive Function Planning ability impaired   Visual Perception   Visual Perception Wears glasses   Visual Perception Comments eyes have been changing, cannot read far away, but primarily uses them for reading.     Sensory Examination   Sensory Comments BUE WNL   Integumentary/Edema   Integumentary/Edema Comments BUE WNL   Range of Motion (ROM)   ROM Comment BUE WNL   Strength   Strength Comments BUE 4/5   Transfer Skill: Bed to Chair/Chair to Bed   Level of [place]: Bed to Chair minimum assist (75% patients effort)   Physical Assist/Nonphysical Assist: Bed to Chair 1 person assist   Weight-Bearing  Restrictions weight-bearing as tolerated   Assistive Device - Transfer Skill Bed to Chair Chair to Bed Rehab Eval standard walker   Transfer Skill: Sit to Stand   Level of [place]: Sit/Stand minimum assist (75% patients effort)   Physical Assist/Nonphysical Assist: Sit/Stand 1 person assist   Transfer Skill: Sit to Stand weight-bearing as tolerated   Assistive Device for Transfer: Sit/Stand standard walker   Lower Body Dressing   Level of [place]: Dress Lower Body maximum assist (25% patients effort)   Physical Assist/Nonphysical Assist: Dress Lower Body 1 person assist   Instrumental Activities of Daily Living (IADL)   Previous Responsibilities meal prep;housekeeping;laundry;shopping;medication management   IADL Comments Pt reporting wall to wall books in apartment.  Friends and sister are completing all IADLS currently, however prior to cancer diagnosis was doing all IADLs independently.  Pt was teaching ballet prior to diagnowsis   Activities of Daily Living Analysis   Impairments Contributing to Impaired Activities of Daily Living cognition impaired;strength decreased;ROM decreased   General Therapy Interventions   Planned Therapy Interventions strengthening;ROM;ADL retraining   Clinical Impression   Criteria for Skilled Therapeutic Interventions Met yes, treatment indicated   OT Diagnosis decreased independence and safety in ADLs and IADLs   Influenced by the following impairments hip precautions, strength, activity tolerance   Assessment of Occupational Performance 3-5 Performance Deficits   Identified Performance Deficits dressing, bathing, toileting, transfers   Clinical Decision Making (Complexity) Moderate complexity   Therapy Frequency 5 times/wk   Predicted Duration of Therapy Intervention (days/wks) 2 weeks   Anticipated Equipment Needs at Discharge dressing stick;shower chair;sock aide;raised toilet seat   Anticipated Discharge Disposition Home with Assist;Transitional Care Facility   Risks  "and Benefits of Treatment have been explained. Yes   Patient, Family & other staff in agreement with plan of care Yes   [facility] TM \"6 Clicks\"   2016, Trustees of [facility], under license to [employer].  All rights reserved.   6 Clicks Short Forms Daily Activity Inpatient Short Form   [facility]  \"6 Clicks\" Daily Activity Inpatient Short Form   1. Putting on and taking off regular lower body clothing? 2 - A Lot   2. Bathing (including washing, rinsing, drying)? 2 - A Lot   3. Toileting, which includes using toilet, bedpan or urinal? 2 - A Lot   4. Putting on and taking off regular upper body clothing? 4 - None   5. Taking care of personal grooming such as brushing teeth? 4 - None   6. Eating meals? 4 - None   Daily Activity Raw Score (Score out of 24.Lower scores equate to lower levels of function) 18   Total Evaluation Time   Total Evaluation Time (Minutes) 10     "

## 2018-11-03 NOTE — PROGRESS NOTES
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     # Left hip pain  # Osteoporosis w/ current fracture  # Left LC1 pelvic ring fracture  Etiology of fracture likely multifactorial; most likely 2/2 patient's significant radiation history and osteoporosis, as well as her relatively recent right hip hemiarthroplasty and excess stress on the left leg for approximately four months. Ortho saw the patient and recommended non operative management. She will be managed conservatively focusing on adequate pain control and PT/OT. Notably, patient has not had a formal workup for osteoporosis in the past, labs obtained. We will schedule DEXA as outpatient, PCP follow up on discharge.   - Follow up osteoporosis lab workup  - DEXA, PCP follow up as outpatient  - Ortho consulted, following  - Oxycodone 2.5mg q4h  - Robaxin 500 mg q6h PRN for muscle spasms  - Zofran PRN for nausea  - PT/OT consulted, appreciate recs  - Social work consult for care coordination and discharge planning, appreciate recs  - if no BM today, will increase bowel regimen      # Anemia  Stable. Takes iron at home.  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 Not currently on chemotherapy.     # S/p Right hip hemiarthroplasty 7/26/18  - PT/OT as above    # Pain Assessment:  Current Pain Score 11/3/2018   Patient currently in pain? yes   Pain score (0-10) -   Pain location Hip   Pain descriptors Spasm   - Elizabeth is experiencing pain due to pelvic  fracture. Pain management was discussed and the plan was created in a collaborative fashion.  Elizabeth's response to the current recommendations: engaged  - Please see the plan for pain management as documented above      Diet: Regular Diet Adult  Fluids: Regular diet as above  DVT Prophylaxis: Enoxaparin (Lovenox) SQ  Code Status: Full Code    Disposition Plan   Expected discharge: 2 - 3 days, recommended to transitional care unit once adequate pain management/ tolerating PO medications. Dispo:       Entered: Steve Finn 11/03/2018, 8:52 AM   Information in the above section will display in the discharge planner report.      The patient's care was discussed with the Attending Physician, Dr. Chew and Family Medicine Team.    Steve Finn DO  [facility]  Pager: [phone]  Please see sticky note for cross cover information    Interval History   RN notes and vitals reviewed. Pt had good night per her report. Less spasm and has figured ways of positioning to help pain. Walked a few steps with PT and is working on weight bearing.  Planning TCU dispo and ortho f/u in 2-4 weeks. Planning outpt osteoporosis workup with DEXA as well, started some labs here.        Physical Exam   Vital Signs: Temp: 99.5  F (37.5  C) Temp src: Oral BP: 125/59 Pulse: 80   Resp: 16 SpO2: 97 % O2 Device: None (Room air)    Weight: 120 lbs 0 oz  GENERAL: Thin appearing, eating in bed, NAD. Pleasant   SKIN: Warm, dry, no rashes  HEENT:  Head: Normocephalic, atraumatic  Eyes: Conjunctiva clear, sclera non-icteric, EOM intact  Ears: Hearing grossly intact  Nose: No external lesions  Mouth: Mucous membranes moist  CARDIO: RRR, no m/r/g  PULM: CTAB, no wheezes or crackles  GI: Soft, nontender, nondistended, normal bowel sounds, no masses  MSK: Bilateral lower extremity edema L>R, bilateral lower extremity erythema and crusted scaling to dorsum of feet,   Bilateral 2+ DP pulses, full ROM of ankle and toes, lower  extremities warm, sensation intact bilaterally, pain on pelvic stability testing, hip internal/external rotation; exam limited by pain  Neurologic: Alert and oriented x3      Data     Recent Labs  Lab 11/02/18  0747 11/01/18  1526   WBC  --  5.3   HGB  --  11.2*   MCV  --  89    226   NA  --  139   POTASSIUM  --  3.9   CHLORIDE  --  103   CO2  --  30   BUN  --  15   CR 0.58 0.48*   ANIONGAP  --  6   ELISE  --  8.9   GLC  --  80       No results found for this or any previous visit (from the past 24 hour(s)).  Medications       acetaminophen  1,000 mg Oral TID     docusate sodium  100 mg Oral BID     enoxaparin  40 mg Subcutaneous Q24H     ferrous sulfate  325 mg Oral Daily with breakfast     heparin lock flush  5 mL Intracatheter Daily     lidocaine  2 patch Transdermal Q24H     lidocaine   Transdermal Q8H     lidocaine   Transdermal Q24h     polyethylene glycol  17 g Oral Daily

## 2018-11-03 NOTE — PLAN OF CARE
Problem: Patient Care Overview  Goal: Plan of Care/Patient Progress Review  Discharge Planner OT   Patient plan for discharge: TCU  Current status: Evaluation completed, treatment initiated.  Pt SBA with increased time for log roll and supine to sit transfer.  Pt min A with WW during sit to stand transfer and pivot transfer to chair.  Pt needing max A for lower body dressing.  Barriers to return to prior living situation: Hip precautions, lives alone  Recommendations for discharge: TCU  Rationale for recommendations: Pt with precautions, pain, and decreased activity tolerance limiting independence in ADLs and IADLs.       Entered by: Alyx Rowe 11/03/2018 2:32 PM

## 2018-11-03 NOTE — PLAN OF CARE
Problem: Patient Care Overview  Goal: Plan of Care/Patient Progress Review  [id number]: VSS, denies pain. Using bedpan to void. Needs standing pelvic xray, will try this evening vs tomorrow AM. Continue to assess and monitor, notify Albina's team with concerns.

## 2018-11-03 NOTE — PLAN OF CARE
Problem: Pain, Acute (Adult)  Goal: Acceptable Pain Control/Comfort Level  Patient will demonstrate the desired outcomes by discharge/transition of care.   Outcome: No Change  Vitals:    11/02/18 1156 11/02/18 1623 11/02/18 2029 11/02/18 2342   BP: 127/74 120/64 119/63 123/62   BP Location: Right arm Right arm Right arm Right arm   Pulse: 77 76 99 84   Resp: 16 16 16 16   Temp: 96.5  F (35.8  C) 98  F (36.7  C) 98.7  F (37.1  C) 96.8  F (36  C)   TempSrc: Axillary Oral Oral Oral   SpO2: 96% 94% 98% 93%   Weight:       Height:       Patient stated her pain has been less this shift, took oxycodone x1.   Using bedpan to void,  patient able to lift buttocks to place bedpan.  Patient stated trying to use commode would be too painful.  [place] area blanchable red, washing well and using barrier cream with each void.  Positive bowel sounds and flatus, no bm this shift.  LS clear/diminished.  Tolerating regular diet.  Continue with POC.

## 2018-11-03 NOTE — PROGRESS NOTES
11/02/18 1300   Quick Adds   Type of Visit Initial PT Evaluation   Living Environment   Lives With sibling(s)   Living Arrangements house   Home Accessibility stairs to enter home;stairs within home   Number of Stairs to Enter Home 2   Number of Stairs Within Home 16  (B rails on8 up, 1 rail on 8 down to level pt living on)   Transportation Available family or friend will provide;car   Living Environment Comment Pt living with sister, enters mid level from garage, the 8 up or down to bedroom down and kitchen up   Self-Care   Dominant Hand right   Usual Activity Tolerance moderate   Current Activity Tolerance poor   Regular Exercise yes   Activity/Exercise Type strength training;walking   Exercise Amount/Frequency 5-7 times/wk   Equipment Currently Used at Home walker, rolling   Activity/Exercise/Self-Care Comment Pt reports she was sponge bathing mod I at home, no A for dressing although pt falling asleep answering this question   Functional Level Prior   Ambulation 1-->assistive equipment   Transferring 1-->assistive equipment   Toileting 0-->independent   Bathing (sponge bathing)   Dressing 0-->independent   Eating 0-->independent   Communication 0-->understands/communicates without difficulty   Swallowing 0-->swallows foods/liquids without difficulty   Cognition 0 - no cognition issues reported   Fall history within last six months yes   Number of times patient has fallen within last six months 4   Which of the above functional risks had a recent onset or change? ambulation;transferring   Prior Functional Level Comment Pt was ambulating I with wh walker out of home and at times not using walker in home. Pt would complete her exercises and walk either around the [place] or the ballroom in their lower level daily   General Information   Onset of Illness/Injury or Date of Surgery - Date 10/23/18   Referring Physician Lucia Tubbs MD   Patient/Family Goals Statement have less spasms and be able to walk around  "again   Pertinent History of Current Problem (include personal factors and/or comorbidities that impact the POC) 78 year old female PMH SCC anal cancer s/p chemo and radiation therapy and prior right femoral neck fx s/p hemiarthroplasty (DOS: 7/27/18) presenting to the ED with new onset left hip pain. Patient reports her left hip pain initially began with increased physical activity after recovery from her right hip hemiarthroplasty. Patient reports she had been drastically increasing her activity with exercises and increased walking without assistive devices. This hip pain was then exacerbated 9 days ago after a mild fall while attempting to sit on her commode. Patient notes that she had been able to ambulate since this episode however her left hip pain progressed until it became too difficult for her to ambulate in the last day. Patient denies any pain in her right hip at this time.   Precautions/Limitations fall precautions   Weight-Bearing Status - LLE weight-bearing as tolerated   Weight-Bearing Status - RLE weight-bearing as tolerated   General Observations B LE WBAT per Ortho consult   General Info Comments Pt agreeable to working with PT   Cognitive Status Examination   Orientation orientation to person, place and time   Level of Consciousness alert  (sleepy at times while supine, wakes easily)   Follows Commands and Answers Questions 100% of the time   Personal Safety and Judgment intact   Memory intact   Posture    Posture Forward head position;Kyphosis;Protracted shoulders   Range of Motion (ROM)   ROM Comment L LE limited by significant spasms with any hip abd, improves with add   Strength   Strength Comments L LE not tested due to fx locations but decreased due to pain/spasms and fractures   Bed Mobility   Bed Mobility Comments Mod A sup<>sit   Transfer Skills   Transfer Comments Mod A sit<>stand   Gait   Gait Comments Mod A to side step/\"heel to toe\" with B LE moving together   Balance   Balance " "Comments fair with wh walker and heavy UE support   Coordination   Coordination Comments decreased due to pain/spasms   Modality Interventions   Planned Modality Interventions Cryotherapy   General Therapy Interventions   Planned Therapy Interventions bed mobility training;transfer training;gait training;strengthening;risk factor education;home program guidelines;progressive activity/exercise;balance training;ROM   Clinical Impression   Criteria for Skilled Therapeutic Intervention yes, treatment indicated   PT Diagnosis impaired functional mobility   Influenced by the following impairments decreased strength, ROM, inc spasm, pain   Functional limitations due to impairments decreased I with transfers, gait, bed mob, barrier navigaiton, ADLs   Clinical Presentation Stable/Uncomplicated   Clinical Decision Making (Complexity) Low complexity   Therapy Frequency` (6x/week)   Predicted Duration of Therapy Intervention (days/wks) 11/5/18   Anticipated Discharge Disposition Transitional Care Facility   Risk & Benefits of therapy have been explained Yes   Patient, Family & other staff in agreement with plan of care Yes   [facility] \"6 Clicks\"   2016, Trustees of [facility], under license to [employer].  All rights reserved.   6 Clicks Short Forms Basic Mobility Inpatient Short Form   [facility]  \"6 Clicks\" V.2 Basic Mobility Inpatient Short Form   1. Turning from your back to your side while in a flat bed without using bedrails? 2 - A Lot   2. Moving from lying on your back to sitting on the side of a flat bed without using bedrails? 2 - A Lot   3. Moving to and from a bed to a chair (including a wheelchair)? 2 - A Lot   4. Standing up from a chair using your arms (e.g., wheelchair, or bedside chair)? 2 - A Lot   5. To walk in hospital room? 2 - A Lot   6. Climbing 3-5 steps with a railing? 1 - Total   Basic Mobility Raw Score (Score out of 24.Lower scores equate to lower levels of " function) 11   Total Evaluation Time   Total Evaluation Time (Minutes) 10

## 2018-11-03 NOTE — PLAN OF CARE
Pt VSS.Still  having pain,muscle spasms , and yelling out when ever she moves in bed.Giving pain meds and muscle relaxer when ever able to have.Has used bedpan frequently and had a bm.Ate well.Unable to get weight,pt is not bearing weight this tomy.Continue with POC.

## 2018-11-04 ENCOUNTER — APPOINTMENT (OUTPATIENT)
Dept: OCCUPATIONAL THERAPY | Facility: CLINIC | Age: 78
DRG: 543 | End: 2018-11-04
Payer: MEDICARE

## 2018-11-04 LAB
ANION GAP SERPL CALCULATED.3IONS-SCNC: 7 MMOL/L (ref 3–14)
BUN SERPL-MCNC: 22 MG/DL (ref 7–30)
CALCIUM SERPL-MCNC: 9.2 MG/DL (ref 8.5–10.1)
CALCIUM UR-MCNC: 11.5 MG/DL
CALCIUM/CREAT UR: 0.43 G/G CR
CHLORIDE SERPL-SCNC: 105 MMOL/L (ref 94–109)
CO2 SERPL-SCNC: 28 MMOL/L (ref 20–32)
CREAT SERPL-MCNC: 0.48 MG/DL (ref 0.52–1.04)
CREAT UR-MCNC: 27 MG/DL
DEPRECATED CALCIDIOL+CALCIFEROL SERPL-MC: 19 UG/L (ref 20–75)
GFR SERPL CREATININE-BSD FRML MDRD: >90 ML/MIN/1.7M2
GLUCOSE SERPL-MCNC: 133 MG/DL (ref 70–99)
PLATELET # BLD AUTO: 271 10E9/L (ref 150–450)
POTASSIUM SERPL-SCNC: 4.2 MMOL/L (ref 3.4–5.3)
SODIUM SERPL-SCNC: 140 MMOL/L (ref 133–144)

## 2018-11-04 PROCEDURE — 82340 ASSAY OF CALCIUM IN URINE: CPT | Performed by: FAMILY MEDICINE

## 2018-11-04 PROCEDURE — A9270 NON-COVERED ITEM OR SERVICE: HCPCS | Mod: GY | Performed by: FAMILY MEDICINE

## 2018-11-04 PROCEDURE — 97535 SELF CARE MNGMENT TRAINING: CPT | Mod: GO

## 2018-11-04 PROCEDURE — 12000008 ZZH R&B INTERMEDIATE UMMC

## 2018-11-04 PROCEDURE — 84166 PROTEIN E-PHORESIS/URINE/CSF: CPT | Performed by: FAMILY MEDICINE

## 2018-11-04 PROCEDURE — 97110 THERAPEUTIC EXERCISES: CPT | Mod: GO

## 2018-11-04 PROCEDURE — 97530 THERAPEUTIC ACTIVITIES: CPT | Mod: GO

## 2018-11-04 PROCEDURE — 85049 AUTOMATED PLATELET COUNT: CPT | Performed by: FAMILY MEDICINE

## 2018-11-04 PROCEDURE — 25000132 ZZH RX MED GY IP 250 OP 250 PS 637: Mod: GY | Performed by: STUDENT IN AN ORGANIZED HEALTH CARE EDUCATION/TRAINING PROGRAM

## 2018-11-04 PROCEDURE — 80048 BASIC METABOLIC PNL TOTAL CA: CPT | Performed by: STUDENT IN AN ORGANIZED HEALTH CARE EDUCATION/TRAINING PROGRAM

## 2018-11-04 PROCEDURE — 25000128 H RX IP 250 OP 636: Performed by: STUDENT IN AN ORGANIZED HEALTH CARE EDUCATION/TRAINING PROGRAM

## 2018-11-04 PROCEDURE — A9270 NON-COVERED ITEM OR SERVICE: HCPCS | Mod: GY | Performed by: STUDENT IN AN ORGANIZED HEALTH CARE EDUCATION/TRAINING PROGRAM

## 2018-11-04 PROCEDURE — 25000132 ZZH RX MED GY IP 250 OP 250 PS 637: Mod: GY | Performed by: FAMILY MEDICINE

## 2018-11-04 PROCEDURE — 40000133 ZZH STATISTIC OT WARD VISIT

## 2018-11-04 PROCEDURE — 36592 COLLECT BLOOD FROM PICC: CPT | Performed by: FAMILY MEDICINE

## 2018-11-04 RX ADMIN — CALCIUM CARBONATE (ANTACID) CHEW TAB 500 MG 500 MG: 500 CHEW TAB at 20:43

## 2018-11-04 RX ADMIN — METHOCARBAMOL 500 MG: 500 TABLET, FILM COATED ORAL at 19:28

## 2018-11-04 RX ADMIN — DOCUSATE SODIUM 100 MG: 100 CAPSULE, LIQUID FILLED ORAL at 20:48

## 2018-11-04 RX ADMIN — SENNOSIDES AND DOCUSATE SODIUM 1 TABLET: 8.6; 5 TABLET ORAL at 20:43

## 2018-11-04 RX ADMIN — FERROUS SULFATE TAB 325 MG (65 MG ELEMENTAL FE) 325 MG: 325 (65 FE) TAB at 08:07

## 2018-11-04 RX ADMIN — Medication 2.5 MG: at 08:07

## 2018-11-04 RX ADMIN — ENOXAPARIN SODIUM 40 MG: 40 INJECTION SUBCUTANEOUS at 20:43

## 2018-11-04 RX ADMIN — ACETAMINOPHEN 1000 MG: 500 TABLET, FILM COATED ORAL at 08:07

## 2018-11-04 RX ADMIN — ACETAMINOPHEN 1000 MG: 500 TABLET, FILM COATED ORAL at 20:43

## 2018-11-04 RX ADMIN — METHOCARBAMOL 500 MG: 500 TABLET, FILM COATED ORAL at 11:29

## 2018-11-04 RX ADMIN — CHOLECALCIFEROL CAP 125 MCG (5000 UNIT) 5000 UNITS: 125 CAP at 13:29

## 2018-11-04 RX ADMIN — Medication 5 ML: at 07:58

## 2018-11-04 RX ADMIN — ACETAMINOPHEN 1000 MG: 500 TABLET, FILM COATED ORAL at 13:29

## 2018-11-04 RX ADMIN — Medication 2.5 MG: at 22:35

## 2018-11-04 RX ADMIN — CALCIUM CARBONATE (ANTACID) CHEW TAB 500 MG 500 MG: 500 CHEW TAB at 08:07

## 2018-11-04 RX ADMIN — VITAMIN D, TAB 1000IU (100/BT) 1000 UNITS: 25 TAB at 08:07

## 2018-11-04 RX ADMIN — CALCIUM CARBONATE (ANTACID) CHEW TAB 500 MG 500 MG: 500 CHEW TAB at 13:29

## 2018-11-04 RX ADMIN — Medication 2.5 MG: at 17:20

## 2018-11-04 RX ADMIN — LIDOCAINE 2 PATCH: 560 PATCH PERCUTANEOUS; TOPICAL; TRANSDERMAL at 08:09

## 2018-11-04 RX ADMIN — Medication 2.5 MG: at 02:05

## 2018-11-04 ASSESSMENT — ACTIVITIES OF DAILY LIVING (ADL)
ADLS_ACUITY_SCORE: 17
ADLS_ACUITY_SCORE: 17
ADLS_ACUITY_SCORE: 18
ADLS_ACUITY_SCORE: 17

## 2018-11-04 ASSESSMENT — PAIN DESCRIPTION - DESCRIPTORS
DESCRIPTORS: ACHING
DESCRIPTORS: ACHING;SORE

## 2018-11-04 NOTE — PLAN OF CARE
Problem: Patient Care Overview  Goal: Plan of Care/Patient Progress Review  Discharge Planner OT   Patient plan for discharge: TCU  Current status: Pt demonstrating increased independence in activity.  Pt SBA during bed mobility and supine to sit transfer.  Pt CGA with WW during sit to stand transfer and transfer from bed to chair.  To work on BUE strength completed AROM exercises.  Pt also needing mod A to [place] socks.  Barriers to return to prior living situation: fear, leg spasms  Recommendations for discharge: TCU  Rationale for recommendations: Pt continues to demonstrate decreased strength and activity tolerance with increased pain in LE limiting independence and safety in ADLs and IADls.       Entered by: Alyx Rowe 11/04/2018 11:05 AM

## 2018-11-04 NOTE — PLAN OF CARE
Problem: Patient Care Overview  Goal: Plan of Care/Patient Progress Review  PT: attempted to see pt for PT while sitting up in chair after OT session, however pt just returned to bed with A from staff and did not feel able to participate in PT at this time. Pt would like to wash up and then take a nap. Will attempt back as schedule allows.

## 2018-11-04 NOTE — PROGRESS NOTES
[facility], [facility] Progress Note - [place]'s Service       Main Plans for Today   - Pain management  - Dispo planning TCU - patient hoping for [facility]  - Sister to bring Enko cream from home (unable to find on formulary here), will continue barrier cream here until then.       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     # Left hip pain  # Osteoporosis w/ current fracture  # Left LC1 pelvic ring fracture  Etiology of fracture likely multifactorial; most likely 2/2 patient's significant radiation history and osteoporosis, as well as her relatively recent right hip hemiarthroplasty and excess stress on the left leg for approximately four months. Ortho saw the patient and recommended non operative management. She will be managed conservatively focusing on adequate pain control and PT/OT. Notably, patient has not had a formal workup for osteoporosis in the past, labs obtained. We will schedule DEXA as outpatient, PCP follow up on discharge.   - Follow up osteoporosis lab workup  - DEXA, PCP follow up as outpatient  - Ortho consulted, following  - Oxycodone 2.5mg q4h  - Robaxin 500 mg q6h PRN for muscle spasms  - Zofran PRN for nausea  - PT/OT consulted, appreciate recs  - Social work consult for care coordination and discharge planning, appreciate recs  - Continued scheduled Miralax and Colace (pt refused this AM), add prn Senna as needed.      # Anemia  Stable. Takes iron at home.  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 Not currently on chemotherapy.     # S/p Right hip hemiarthroplasty 7/26/18  - PT/OT as above    # Pain Assessment:  Current Pain Score  11/4/2018   Patient currently in pain? yes   Pain score (0-10) -   Pain location Hip   Pain descriptors Spasm   - Elizabeth is experiencing pain due to pelvic fracture. Pain management was discussed and the plan was created in a collaborative fashion.  Elizabeth's response to the current recommendations: engaged  - Please see the plan for pain management as documented above      Diet: Regular Diet Adult  Fluids: Regular diet as above  DVT Prophylaxis: Enoxaparin (Lovenox) SQ  Code Status: Full Code    Disposition Plan   Expected discharge: 2 - 3 days, recommended to transitional care unit once adequate pain management/ tolerating PO medications. Dispo:       Entered: Quin Mancuso 11/04/2018, 10:26 AM   Information in the above section will display in the discharge planner report.      The patient's care was discussed with the patient, [facility] Family  Medicine Team and bedside RN.     Quin Mancuso DO  [facility]  Pager: [phone]  Please see sticky note for cross cover information    Interval History   RN notes and vitals reviewed. Pt tells me she gets better day by day. Has some erythema in her L groin for which she uses Enko cream at home, wonders if we can order it for her here.     Physical Exam   Vital Signs: Temp: 97  F (36.1  C) Temp src: Oral BP: 115/64 Pulse: 74   Resp: 16 SpO2: 97 % O2 Device: None (Room air)    Weight: 120 lbs 0 oz  GENERAL: Thin appearing, eating in bed, NAD. Pleasant   SKIN: Warm, dry, no rashes  HEENT:  Head: Normocephalic, atraumatic  Eyes: Conjunctiva clear, sclera non-icteric, EOM intact  Ears: Hearing grossly intact  Nose: No external lesions  Mouth: Mucous membranes moist  PULM: No increased work of breathing.   GI: Soft, nondistended.   MSK: Bilateral lower extremity edema L>R, bilateral lower extremity erythema and crusted scaling to dorsum of feet.   Skin: L groin examined, but covered in a fresh dressing which was not removed. Per nursing,  appears to have mild erythema only.   Neurologic: Alert and oriented x3      Data     Recent Labs  Lab 11/04/18  0803 11/03/18  0959 11/02/18  0747 11/01/18  1526   WBC  --   --   --  5.3   HGB  --   --   --  11.2*   MCV  --   --   --  89     --  275 226    141  --  139   POTASSIUM 4.2 3.9  --  3.9   CHLORIDE 105 106  --  103   CO2 28 29  --  30   BUN 22 18  --  15   CR 0.48* 0.50* 0.58 0.48*   ANIONGAP 7 6  --  6   ELISE 9.2 8.7  --  8.9   * 86  --  80   ALBUMIN  --  2.8*  --   --    PROTTOTAL  --  6.6*  --   --    BILITOTAL  --  0.4  --   --    ALKPHOS  --  151*  --   --    ALT  --  18  --   --    AST  --  16  --   --        Recent Results (from the past 24 hour(s))   XR Pelvis G/E 3 Views    Narrative    Exam: 3 views of the pelvis dated 11/3/2018.    COMPARISON: CT dated 11/1/2018.    CLINICAL HISTORY: Stability of fractures post weightbearing.    FINDINGS: 3 views of the pelvis were obtained. Partially visualized  right hip hemiarthroplasty. Redemonstration of known fracture  involving the junction of the left acetabulum with the left superior  pubic rami. Mild irregularity at the known left inferior pubic rami  fracture. The known sacral fractures are not well-seen on plain  radiographs. The bones are osteopenic appearing.      Impression    IMPRESSION:  1. Known fractures involving the left acetabulum at the junction with  the left superior pubic rami and the left inferior pubic rami are  again seen, not significantly changed. The sacral fractures not well  evaluated on plain radiographs.  2. Postsurgical changes of a right hip hemiarthroplasty.    CONCETTA JERONIMO MD     Medications       acetaminophen  1,000 mg Oral TID     calcium carbonate  500 mg Oral TID     cholecalciferol  1,000 Units Oral Daily     docusate sodium  100 mg Oral BID     enoxaparin  40 mg Subcutaneous Q24H     ferrous sulfate  325 mg Oral Daily with breakfast     heparin lock flush  5 mL Intracatheter Daily      lidocaine  2 patch Transdermal Q24H     lidocaine   Transdermal Q8H     lidocaine   Transdermal Q24h     polyethylene glycol  17 g Oral Daily

## 2018-11-04 NOTE — PROGRESS NOTES
Orthopaedic Surgery Progress Note 2018    S: No acute events overnight.  Pain remains well controlled. Denies numbness or tingling. Denies chest pain, SOB, nausea/vomiting. Tolerating PO diet. Ambulating, working with PT.    O:  Temp: 99.1  F (37.3  C) Temp src: Oral BP: 124/65 Pulse: 92   Resp: 16 SpO2: 95 % O2 Device: None (Room air)      Exam:  Gen: No acute distress, resting comfortably in bed.  Resp: Non-labored breathing  MSK:  Pelvis: Stable to AP and Lateral compression. Mild TTP over anterior pelvis and sacrum.  LLE:  - No pain with logroll or hip ROM  - SILT femoral/tibial/sural/saphenous/DP/SP nerves  - Fires Quad, TA, EHL, FHL, GaSC  - PT/DP pulses 2+, foot wwp      Recent Labs  Lab 18  0747 18  1526   WBC  --  5.3   HGB  --  11.2*    226   CRP  --  3.0     Imaging:  Xray AP Pelvis/inlet/outlet: no interval displacement of fractures involving the left superior pubic root / anterior column or inferior rami. Sacral fractures not well visualized but no apparent fracture displacement of the posterior pelvis. Pelvic ring appears stable.    ASSESSMENT:   Elizabeth Taylor is a 78 year old F PMH SCC anal cancer s/p chemo and radiation therapy and prior right femoral neck fx s/p hemiarthroplasty with the followin. Left LC1 pelvic ring fracture     PLAN:   - Admitted to Medicine.  - Plan for non-operative management with pain control and progressive mobilization.  - Anticoagulation/DVT Prophylaxis: per primary  - X-rays/Imaging: AP Pelvis with inlet and outlet views complete with no fracture displacement  - Activity: up with assist  - Weight bearing: WBAT b/l LE  - Pain control: per primary  - Diet: regular  - Follow-up: follow-up Dr. Phan in out-patient orthopedic clinic in 2 weeks, appointment requested  - Disposition: per primary, pending pain control, PTOT and placement     Assessment and Plan discussed with Dr. Phan, Orthopaedic Surgery.      Arnold Brown MD 2018  9:28 PM  Orthopaedic Surgery Resident, PGY-1  Pager: [phone]  Future Appointments  Date Time Provider Department Center   11/16/2018 2:00 PM Zaire Phan MD [facility]   11/26/2018 11:00 AM Emir Rosas MD [facility]   11/27/2018 8:00 AM  [facility]   11/27/2018 8:20 AM Tim Larose PA [facility]   11/27/2018 10:00 AM Zaire Phan MD [facility]   2/22/2019 9:00 AM  [facility]   2/22/2019 9:40 AM [facility]   2/22/2019 12:30 PM Shen Ray MD [facility]   2/22/2019 2:00 PM Zaire Madison MD [facility]     Orthopedic staff: Dr. Sylvester Brown MD  Orthopaedic Surgery PGY-1  Pager [phone]    Please page me directly with any questions/concerns during regular weekday hours before 5pm. If there is no response, if it is a weekend, or if it is during evening hours then please page the orthopaedic surgery resident on call.

## 2018-11-04 NOTE — PLAN OF CARE
Problem: Patient Care Overview  Goal: Plan of Care/Patient Progress Review  Outcome: Improving  VSS. A&O x4. Pt forgetful at times.  GI/: Pt voiding by bedpan and incont x1. Barrier cream applied to perineal redness, pt family will bring Enko cream from home when able.   Diet: tolerating regular diet  Incisions/Drains: none  IV: R ch port hep locked.  Activity: up to chair SBA walker with OT WBAT;   PRN meds: oxy, robaxin and scheduled tylenol for pain. Lido patch on and patient requested to be off because heat is more effective, t pump ordered.

## 2018-11-04 NOTE — PLAN OF CARE
Problem: Pain, Acute (Adult)  Goal: Acceptable Pain Control/Comfort Level  Patient will demonstrate the desired outcomes by discharge/transition of care.   Outcome: Improving  Vitals:    11/03/18 1228 11/03/18 1547 11/03/18 1947 11/03/18 2305   BP: 139/78 124/61 129/81 124/65   BP Location: Right arm Right arm Right arm Right arm   Pulse: 85 74 87 92   Resp: 16 16 16 16   Temp: 99.7  F (37.6  C) 98.4  F (36.9  C) 97.7  F (36.5  C) 99.1  F (37.3  C)   TempSrc: Oral Axillary Axillary Oral   SpO2: 96% 95% 94% 95%   Weight:       Height:       From 8048-4522:  Patient with pain in lft hip, taking oxycodone and flexeril with some relief.  Had standing pelvic xray done.  Patient able to stand, take a few steps and pivot into w/c using walker and SBA.   LS clear/diminished.  Tolerating regular diet.  Abd with positive bowel sounds and flatus, no bm this shift.  Incontinent of urine , making bedpan only x2.  Urine sent for ordered labs.  Kenisha area with blanchable redness, cleaned and barrier cream applied with each void.   Patient refused lovenox.  Continue with POC.

## 2018-11-05 VITALS
HEIGHT: 64 IN | HEART RATE: 88 BPM | RESPIRATION RATE: 16 BRPM | SYSTOLIC BLOOD PRESSURE: 117 MMHG | TEMPERATURE: 97.9 F | DIASTOLIC BLOOD PRESSURE: 60 MMHG | WEIGHT: 120 LBS | BODY MASS INDEX: 20.49 KG/M2 | OXYGEN SATURATION: 97 %

## 2018-11-05 LAB
ALBUMIN SERPL ELPH-MCNC: 3.3 G/DL (ref 3.7–5.1)
ALPHA1 GLOB SERPL ELPH-MCNC: 0.4 G/DL (ref 0.2–0.4)
ALPHA2 GLOB SERPL ELPH-MCNC: 0.8 G/DL (ref 0.5–0.9)
ANION GAP SERPL CALCULATED.3IONS-SCNC: 6 MMOL/L (ref 3–14)
B-GLOBULIN SERPL ELPH-MCNC: 0.7 G/DL (ref 0.6–1)
BASOPHILS # BLD AUTO: 0 10E9/L (ref 0–0.2)
BASOPHILS NFR BLD AUTO: 0.5 %
BUN SERPL-MCNC: 29 MG/DL (ref 7–30)
CALCIUM SERPL-MCNC: 8.9 MG/DL (ref 8.5–10.1)
CHLORIDE SERPL-SCNC: 106 MMOL/L (ref 94–109)
CO2 SERPL-SCNC: 29 MMOL/L (ref 20–32)
CREAT SERPL-MCNC: 0.52 MG/DL (ref 0.52–1.04)
DIFFERENTIAL METHOD BLD: ABNORMAL
EOSINOPHIL # BLD AUTO: 0.2 10E9/L (ref 0–0.7)
EOSINOPHIL NFR BLD AUTO: 3.2 %
ERYTHROCYTE [DISTWIDTH] IN BLOOD BY AUTOMATED COUNT: 16.7 % (ref 10–15)
GAMMA GLOB SERPL ELPH-MCNC: 1 G/DL (ref 0.7–1.6)
GFR SERPL CREATININE-BSD FRML MDRD: >90 ML/MIN/1.7M2
GLUCOSE SERPL-MCNC: 100 MG/DL (ref 70–99)
HCT VFR BLD AUTO: 40.1 % (ref 35–47)
HGB BLD-MCNC: 12.2 G/DL (ref 11.7–15.7)
IMM GRANULOCYTES # BLD: 0 10E9/L (ref 0–0.4)
IMM GRANULOCYTES NFR BLD: 0.2 %
LYMPHOCYTES # BLD AUTO: 1 10E9/L (ref 0.8–5.3)
LYMPHOCYTES NFR BLD AUTO: 17 %
M PROTEIN SERPL ELPH-MCNC: 0 G/DL
MAGNESIUM SERPL-MCNC: 1.8 MG/DL (ref 1.6–2.3)
MCH RBC QN AUTO: 27.5 PG (ref 26.5–33)
MCHC RBC AUTO-ENTMCNC: 30.4 G/DL (ref 31.5–36.5)
MCV RBC AUTO: 90 FL (ref 78–100)
MONOCYTES # BLD AUTO: 0.6 10E9/L (ref 0–1.3)
MONOCYTES NFR BLD AUTO: 10.5 %
NEUTROPHILS # BLD AUTO: 3.9 10E9/L (ref 1.6–8.3)
NEUTROPHILS NFR BLD AUTO: 68.6 %
NRBC # BLD AUTO: 0 10*3/UL
NRBC BLD AUTO-RTO: 0 /100
PHOSPHATE SERPL-MCNC: 4.2 MG/DL (ref 2.5–4.5)
PLATELET # BLD AUTO: 269 10E9/L (ref 150–450)
POTASSIUM SERPL-SCNC: 4.1 MMOL/L (ref 3.4–5.3)
PROT PATTERN SERPL ELPH-IMP: ABNORMAL
PROT PATTERN UR ELPH-IMP: NORMAL
RBC # BLD AUTO: 4.44 10E12/L (ref 3.8–5.2)
SODIUM SERPL-SCNC: 142 MMOL/L (ref 133–144)
WBC # BLD AUTO: 5.7 10E9/L (ref 4–11)

## 2018-11-05 PROCEDURE — 25000132 ZZH RX MED GY IP 250 OP 250 PS 637: Mod: GY | Performed by: STUDENT IN AN ORGANIZED HEALTH CARE EDUCATION/TRAINING PROGRAM

## 2018-11-05 PROCEDURE — A9270 NON-COVERED ITEM OR SERVICE: HCPCS | Mod: GY | Performed by: FAMILY MEDICINE

## 2018-11-05 PROCEDURE — 80048 BASIC METABOLIC PNL TOTAL CA: CPT | Performed by: FAMILY MEDICINE

## 2018-11-05 PROCEDURE — A9270 NON-COVERED ITEM OR SERVICE: HCPCS | Mod: GY | Performed by: STUDENT IN AN ORGANIZED HEALTH CARE EDUCATION/TRAINING PROGRAM

## 2018-11-05 PROCEDURE — G0463 HOSPITAL OUTPT CLINIC VISIT: HCPCS

## 2018-11-05 PROCEDURE — 84100 ASSAY OF PHOSPHORUS: CPT | Performed by: FAMILY MEDICINE

## 2018-11-05 PROCEDURE — 83735 ASSAY OF MAGNESIUM: CPT | Performed by: FAMILY MEDICINE

## 2018-11-05 PROCEDURE — 84080 ASSAY ALKALINE PHOSPHATASES: CPT | Performed by: FAMILY MEDICINE

## 2018-11-05 PROCEDURE — 25000128 H RX IP 250 OP 636: Performed by: STUDENT IN AN ORGANIZED HEALTH CARE EDUCATION/TRAINING PROGRAM

## 2018-11-05 PROCEDURE — 36592 COLLECT BLOOD FROM PICC: CPT | Performed by: FAMILY MEDICINE

## 2018-11-05 PROCEDURE — 25000132 ZZH RX MED GY IP 250 OP 250 PS 637: Mod: GY | Performed by: FAMILY MEDICINE

## 2018-11-05 PROCEDURE — 85025 COMPLETE CBC W/AUTO DIFF WBC: CPT | Performed by: FAMILY MEDICINE

## 2018-11-05 RX ORDER — BISACODYL 10 MG
10 SUPPOSITORY, RECTAL RECTAL DAILY PRN
Qty: 30 SUPPOSITORY | DISCHARGE
Start: 2018-11-05 | End: 2019-02-07

## 2018-11-05 RX ORDER — METHOCARBAMOL 500 MG/1
500 TABLET, FILM COATED ORAL EVERY 6 HOURS PRN
Qty: 120 TABLET | DISCHARGE
Start: 2018-11-05 | End: 2019-01-17

## 2018-11-05 RX ORDER — OXYCODONE HYDROCHLORIDE 5 MG/1
2.5 TABLET ORAL EVERY 4 HOURS PRN
Qty: 10 TABLET | Refills: 0 | Status: SHIPPED | DISCHARGE
Start: 2018-11-05 | End: 2019-01-17

## 2018-11-05 RX ORDER — LIDOCAINE 4 G/G
2 PATCH TOPICAL EVERY 24 HOURS
DISCHARGE
Start: 2018-11-05 | End: 2019-02-07

## 2018-11-05 RX ORDER — DOCUSATE SODIUM 100 MG/1
100 CAPSULE, LIQUID FILLED ORAL 2 TIMES DAILY
Qty: 60 CAPSULE | DISCHARGE
Start: 2018-11-05 | End: 2021-11-29 | Stop reason: DRUGHIGH

## 2018-11-05 RX ORDER — TRAMADOL HYDROCHLORIDE 50 MG/1
50 TABLET ORAL EVERY 6 HOURS PRN
Qty: 30 TABLET | Refills: 0 | Status: SHIPPED | DISCHARGE
Start: 2018-11-05 | End: 2019-01-17

## 2018-11-05 RX ORDER — POLYETHYLENE GLYCOL 3350 17 G/17G
17 POWDER, FOR SOLUTION ORAL DAILY PRN
Qty: 7 PACKET | DISCHARGE
Start: 2018-11-05 | End: 2019-02-07

## 2018-11-05 RX ADMIN — Medication 2.5 MG: at 10:18

## 2018-11-05 RX ADMIN — Medication 2.5 MG: at 14:09

## 2018-11-05 RX ADMIN — Medication 2.5 MG: at 02:51

## 2018-11-05 RX ADMIN — FERROUS SULFATE TAB 325 MG (65 MG ELEMENTAL FE) 325 MG: 325 (65 FE) TAB at 07:29

## 2018-11-05 RX ADMIN — CHOLECALCIFEROL CAP 125 MCG (5000 UNIT) 5000 UNITS: 125 CAP at 07:29

## 2018-11-05 RX ADMIN — Medication 5 ML: at 07:30

## 2018-11-05 RX ADMIN — Medication 5 ML: at 14:10

## 2018-11-05 RX ADMIN — CALCIUM CARBONATE (ANTACID) CHEW TAB 500 MG 500 MG: 500 CHEW TAB at 07:29

## 2018-11-05 RX ADMIN — ACETAMINOPHEN 1000 MG: 500 TABLET, FILM COATED ORAL at 07:29

## 2018-11-05 RX ADMIN — METHOCARBAMOL 500 MG: 500 TABLET, FILM COATED ORAL at 13:50

## 2018-11-05 RX ADMIN — DOCUSATE SODIUM 100 MG: 100 CAPSULE, LIQUID FILLED ORAL at 07:29

## 2018-11-05 RX ADMIN — METHOCARBAMOL 500 MG: 500 TABLET, FILM COATED ORAL at 07:29

## 2018-11-05 RX ADMIN — METHOCARBAMOL 500 MG: 500 TABLET, FILM COATED ORAL at 01:33

## 2018-11-05 RX ADMIN — ACETAMINOPHEN 1000 MG: 500 TABLET, FILM COATED ORAL at 14:09

## 2018-11-05 ASSESSMENT — ACTIVITIES OF DAILY LIVING (ADL)
ADLS_ACUITY_SCORE: 17
ADLS_ACUITY_SCORE: 17
ADLS_ACUITY_SCORE: 18
ADLS_ACUITY_SCORE: 17

## 2018-11-05 NOTE — PLAN OF CARE
Problem: Patient Care Overview  Goal: Plan of Care/Patient Progress Review  Outcome: Adequate for Discharge Date Met: 11/05/18  VSS. Patient to be discharged at 1500 when sister arrives via car to TCU.  Belongings sent with patient. Port de-accessed before discharge.   AVS printed and sent with patient. Oxy given before discharge. Report given to RN at TCU.

## 2018-11-05 NOTE — PLAN OF CARE
Problem: Patient Care Overview  Goal: Plan of Care/Patient Progress Review  PT: pt stating increased today, unable to tolerate PT today.

## 2018-11-05 NOTE — PROGRESS NOTES
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    A: Pt sitting on bed pan and WOC assisted with kenisha cares. During kenisha cares pt complains of significant pain around urethral opening. Perineal region is red, raw, denuded and very painful for patient. Skin cleansed with marisabel cleanse and protect and criticaid barrier cream applied. Pt using INZO barrier cream at home and is ok to continue using this, sister to bring in today.     R:Pt has radiation skin irritation from anal cancer will manage with routine pericare and barrier cream.    Kenisha cares: BID and PRN  1. Cleanse with marisabel cleanse and protect and wipe away with misael Wash cloths. Avoid pre moistened wipes as these may irritate skin further.  2. Apply thin layer of critic aid barrier paste or Inzo barrier paste provided by patient.   3. Leave skin open to air as much as possible to allow air flow to skin.

## 2018-11-05 NOTE — PLAN OF CARE
Problem: Pain, Acute (Adult)  Goal: Acceptable Pain Control/Comfort Level  Patient will demonstrate the desired outcomes by discharge/transition of care.   Outcome: No Change  Vitals:    11/04/18 1219 11/04/18 1658 11/04/18 1948 11/04/18 2228   BP: 119/59 122/63 114/58 108/67   BP Location: Right arm Right arm Right arm Right arm   Pulse: 75 78 84 117   Resp: 16 16 16 16   Temp: 98.8  F (37.1  C) 98.2  F (36.8  C) 98  F (36.7  C) 96.8  F (36  C)   TempSrc: Oral Oral Oral Oral   SpO2: 95% 95% 96% 96%   Weight:       Height:       From 7453-7210:  Patient with complaints of spasms in BLE, given robaxin q6 hrs,  Oxycodone for pain with fair relief.  Patient ambulated with walker in room with frequent breaks to sit in chair.   Has used bedpan with no incontinence this shift.  Kenisha area remains red/tender -washing and using barrier cream after each void.  Abd soft, positive flatus, no bm this shift.  Tolerating regular diet.  Awaiting TCU placement.  Continue with POC.

## 2018-11-05 NOTE — PROGRESS NOTES
Social Work Services Discharge Note      Patient Name:  Elizabeth Taylor     Anticipated Discharge Date:  11/5/18    Discharge Disposition:   TCU:  [facility]    Following MD:  per facility's designation     Pre-Admission Screening (PAS) online form has been completed.  The Level of Care (LOC) is:  Determined  Confirmation Code is:  [id number]    Patient/caregiver informed of referral to [facility] for Pre-Admission Screening for skilled nursing facility (SNF) placement and to expect a phone call post discharge from SNF.     Additional Services/Equipment Arranged:  Sister (Sahil) to transport pt this afternoon.   planned for around 3 PM.      Patient / Family response to discharge plan:  in agreement. Pt aware that she will need to pay out of pocket ($310/day, $258/day after 31st day). Pt is in agreement with this as she understands she is out of Medicare days at this time. Sister, Sahil, also notified and is in agreement.      Persons notified of above discharge plan:  Pt, pt's sister, [place]'s team, pt's nurse, 7 charge, TCU    Staff Discharge Instructions:  Please fax discharge orders and signed hard scripts for any controlled substances. SW to fax.   Nurse-to-nurse: [phone]  Please print a packet and send with patient.     CTS Handoff completed:  YES    Medicare Notice of Rights provided to the patient/family:  YES        JEFF Hannon, JAMAL  7B covering  11/5-11/9  Pager: [phone]  11/5/2018

## 2018-11-05 NOTE — DISCHARGE SUMMARY
[facility], [facility] Discharge Summary- [facility]  [facility]    Date of Admission:  11/1/2018  Date of Service: 11/5/2018  Date of Discharge:  11/5/2018  Discharging Attending Provider: Daryn Robert MD  Discharge Team: [facility]     Discharge Diagnoses   # Left hip pain  # Osteoporosis w/ current fracture  # Left LC1 pelvic ring fracture  # S/p Right hip hemiarthroplasty 7/26/18  # pain management     Follow-ups Needed After Discharge     - DEXA scan and follow up with ortho/sports med and PCP  - [id number] Prior auth for Forteo  - Follow up osteoporosis laboratory workup.     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      # Left hip pain  # Osteoporosis w/ current fracture  # Left LC1 pelvic ring fracture  # S/p Right hip hemiarthroplasty 7/26/18  # pain management   Etiology of fracture likely multifactorial; most likely 2/2 patient's significant radiation history and osteoporosis, as well as her relatively recent right hip hemiarthroplasty and excess stress on the left leg for approximately four months. Ortho saw the patient and recommended non operative management. She was managed conservatively focusing on adequate pain control and PT/OT.  Osteoporosis workup was initiated and should be completed as an outpatient. Bowel rege min in place. DEXA was ordered on discharge for outpatient, PCP follow up on discharge.   - Follow up osteoporosis lab workup  - DEXA, PCP follow up as outpatient  - Oxycodone 2.5mg q4h  - Robaxin 500 mg q6h PRN for muscle spasms  - Continued scheduled Miralax and Colace add prn Senna as needed.       # Anemia  Stable. Continue home iron       # H/o cT2 N1a M0 (stage IIIA) SCC of the  anal canal  Follows with oncology. Not currently on chemotherapy.        # Discharge Pain Plan:   - During her hospitalization, Elizabeth experienced pain due to fracture.  The pain plan for discharge was discussed with Elizabeth and the plan was created in a collaborative fashion.    - Opioids prescribed on discharge: as above  - Duration of opioids after discharge: Recommended continuation and weaning per TCU.   - Bowel regimen: senna and miralax  - Pharmacologic adjuvants:  NSAIDs, Acetaminophen and Lidocaine gel  - Non-pharmacologic adjuvants: Heat, Ice and Physical Therapy    Consultations This Hospital Stay   MEDICATION HISTORY IP PHARMACY CONSULT  PHYSICAL THERAPY ADULT IP CONSULT  OCCUPATIONAL THERAPY ADULT IP CONSULT  SOCIAL WORK IP CONSULT  WOUND OSTOMY CONTINENCE NURSE  IP CONSULT    Code Status   Full Code     The patient was discussed with and seen by Dr. Robert.    DO [facility] In[facility]  [facility]   Pager:[phone]  ___________________________________________________________________  Review of Systems:  CONSTITUTIONAL: NEGATIVE for fever, chills, change in weight  INTEGUMENTARY/SKIN: NEGATIVE for worrisome rashes, moles or lesions  EYES: NEGATIVE for vision changes or irritation  ENT/MOUTH: NEGATIVE for ear, mouth and throat problems  RESP: NEGATIVE for significant cough or SOB  BREAST: NEGATIVE for masses, tenderness or discharge  CV: NEGATIVE for chest pain, palpitations or peripheral edema  GI: NEGATIVE for nausea, abdominal pain, heartburn, or change in bowel habits  : NEGATIVE for frequency, dysuria, or hematuria  MUSCULOSKELETAL: positive for left leg pain  NEURO: NEGATIVE for weakness, dizziness or paresthesias  ENDOCRINE: NEGATIVE for temperature intolerance, skin/hair changes  HEME/ALLERGY: NEGATIVE for bleeding problems  PSYCHIATRIC: NEGATIVE for changes in mood or affect    Physical Exam   Vital Signs: Temp: 97.9  F (36.6  C) Temp src: Oral  BP: 117/60 Pulse: 88   Resp: 16 SpO2: 97 % O2 Device: None (Room air)    Weight: 120 lbs 0 oz    GENERAL: Thin appearing, eating in bed, NAD. Pleasant   SKIN: Warm, dry, no rashes  HEENT:  Head: Normocephalic, atraumatic  Eyes: Conjunctiva clear, sclera non-icteric, EOM intact  Ears: Hearing grossly intact  Nose: No external lesions  Mouth: Mucous membranes moist  PULM: No increased work of breathing.   GI: Soft, nondistended.   MSK: Bilateral lower extremity edema L>R, bilateral lower extremity erythema and crusted scaling to dorsum of feet.   Skin: L groin examined, but covered in a fresh dressing which was not removed. Per nursing, appears to have mild erythema only.   Neurologic: Alert and oriented x3    Significant Results and Procedures   Most Recent 3 CBC's:  Recent Labs   Lab Test  11/05/18   0540  11/04/18   0803  11/02/18   0747  11/01/18   1526  08/31/18   1121   WBC  5.7   --    --   5.3  6.8   HGB  12.2   --    --   11.2*  10.0*   MCV  90   --    --   89  90   PLT  269  271  275  226  317     Most Recent 3 BMP's:  Recent Labs   Lab Test  11/05/18   0540  11/04/18   0803  11/03/18   0959   NA  142  140  141   POTASSIUM  4.1  4.2  3.9   CHLORIDE  106  105  106   CO2  29  28  29   BUN  29  22  18   CR  0.52  0.48*  0.50*   ANIONGAP  6  7  6   ELISE  8.9  9.2  8.7   GLC  100*  133*  86   ,   Results for orders placed or performed during the hospital encounter of 11/01/18   XR Pelvis and Hip Left 2 Views    Narrative    Exam: XR PELVIS AND HIP LEFT 2 VIEWS, 11/1/2018 3:18 PM    Indication: severe spasm and left hip pain, no fall.;     Comparison: X-ray 10/16/2018    Findings:   AP view of pelvis and 2 views left hip.    Iliopectineal line on left is discontinuous, suspicious for fracture.    Osteopenic appearance of bone. Right hip arthroplasty. Degenerative  changes of the lumbar spine. Soft tissue unremarkable.      Impression    Impression:   Iliopectineal line on left is discontinuous, suspicious for  fracture.  Consider cross-sectional images for confirmation.    SHASTA AMYAA MD   CT Pelvis Bone wo Contrast    Narrative    CT pelvis without contrast    History: Left hip pain with acetabular irregularity.    Techniques: Helical acquisition of images through the pelvis was  obtained without administration of contrast.    DLP: 1423 mGy-cm    Comparison: Radiograph from the same day. PET/CT from May 25, 2018.    Findings:    There is minimally displaced fracture involving the anterior wall and  column of the left acetabulum extending into the superior pubic ramus.  Additionally, there is minimally displaced fracture of the left  inferior pubic ramus.    Additionally, essentially nondisplaced fracture of the left sacrum is  also present. There is also likely nondisplaced fracture of right  sacrum indicated by subtle cortical buckling. These are likely  insufficiency fracture.    Bones appear diffusely osteopenic. Degenerative changes of the  visualized lower lumbar spine, particularly notable facet  arthropathies. Trace anterolisthesis of L4 on L5. Hypertrophic changes  of spinous processes.    Postsurgical change of right hip arthroplasty with beam hardening  artifact partially compromising assessment. Mild degenerative change  of left hip with focal lucency in the medial aspect of left femoral  neck, may be intraosseous lipoma.    Degenerative changes of bilateral sacroiliac joints. Pelvic  enthesopathy.    Partially visualized gallbladder demonstrates cholelithiasis. Colonic  diverticulosis. Trace free fluid in the pelvis, nonspecific. Mild  subcutaneous soft tissue strandings.      Impression    IMPRESSION:  1. Minimally displaced left acetabular fracture including anterior  wall and column with extension into the superior pubic ramus.  2. Minimally displaced left inferior pubic ramus fracture.  3. Minimally displaced left sacral and likely right sacral fractures.  4. Osteopenia.  5. Cholelithiasis.    MELVI  HORTENCIA   XR Pelvis G/E 3 Views    Narrative    Exam: 3 views of the pelvis dated 11/3/2018.    COMPARISON: CT dated 11/1/2018.    CLINICAL HISTORY: Stability of fractures post weightbearing.    FINDINGS: 3 views of the pelvis were obtained. Partially visualized  right hip hemiarthroplasty. Redemonstration of known fracture  involving the junction of the left acetabulum with the left superior  pubic rami. Mild irregularity at the known left inferior pubic rami  fracture. The known sacral fractures are not well-seen on plain  radiographs. The bones are osteopenic appearing.      Impression    IMPRESSION:  1. Known fractures involving the left acetabulum at the junction with  the left superior pubic rami and the left inferior pubic rami are  again seen, not significantly changed. The sacral fractures not well  evaluated on plain radiographs.  2. Postsurgical changes of a right hip hemiarthroplasty.    CONCETTA JERONIMO MD       Pending Results   These results will be followed up by PCP.  Unresulted Labs Ordered in the Past 30 Days of this Admission     Date and Time Order Name Status Description    11/5/2018 0100 Bone specific alk phosphatase In process     11/4/2018 0135 Protein electrophoresis timed urine In process     11/3/2018 0904 N-Telopeptide Cross-Linked Serum In process              Primary Care Physician   Baron Seth    Discharge Disposition   Discharged to rehabilitation facility  Condition at discharge: Stable    Discharge Orders     DX Body Composition     Follow Up ([facility])   Follow up with primary care provider, Baron Seth, in one month for hospital follow- up and to follow up on results.  The following labs/tests are recommended: Please follow up DEXA results with patient. Recommend initiating Forteo 20 mcg subcutaneous daily for two years and then repeat DEXA, monitor Calcium and Phosphorus every three months while on Forteo. This will require prior authorization which we recommend  pursuing. May consider bisphosphonate therapy if Forteo not a viable option. Also recommend hemoglobin, iron studies at post hospital visit. Please also follow up additional inpatient osteoporosis workup done during hospitalization.     Appointments on [place] and/or [facility] (with [facility] or [facility] provider or service). Call [phone] if you haven't heard regarding these appointments within 7 days of discharge.     General info for SNF   Length of Stay Estimate: Short Term Care: Estimated # of Days <30  Condition at Discharge: Improving  Level of care:skilled   Rehabilitation Potential: Good  Admission H&P remains valid and up-to-date: Yes  Recent Chemotherapy: N/A  Use Nursing Home Standing Orders: Yes     Mantoux instructions   Give two-step Mantoux (PPD) Per Facility Policy Yes     Reason for your hospital stay   Pelvis fracture and pain management     Intake and output   Every shift     Activity - Up with nursing assistance     Weight bearing status   Able to bear weight in both lower extremities.     Encourage PO fluids     Full Code     Fall precautions     Pneumatic Compression Device    Bilateral calf. Remove 30 mins BID.     Advance Diet as Tolerated   Follow this diet upon discharge: Orders Placed This Encounter     Regular Diet Adult       Discharge Medications   Current Discharge Medication List      START taking these medications    Details   bisacodyl (DULCOLAX) 10 MG Suppository Place 1 suppository (10 mg) rectally daily as needed for constipation  Qty: 30 suppository    Associated Diagnoses: Drug-induced constipation      cholecalciferol 5000 units CAPS Take 1 capsule (5,000 Units) by mouth daily    Associated Diagnoses: Age-related osteoporosis with current pathological fracture, initial encounter; Pathological fracture of pelvis, unspecified pathological cause, initial encounter      docusate sodium (COLACE) 100 MG capsule Take 1 capsule (100 mg) by mouth 2 times daily  Qty: 60 capsule     Associated Diagnoses: Drug-induced constipation      Lidocaine (LIDOCARE) 4 % Patch Place 2 patches onto the skin every 24 hours    Associated Diagnoses: Pathological fracture of pelvis, unspecified pathological cause, initial encounter      methocarbamol (ROBAXIN) 500 MG tablet Take 1 tablet (500 mg) by mouth every 6 hours as needed for muscle spasms  Qty: 120 tablet    Associated Diagnoses: Pathological fracture of pelvis, unspecified pathological cause, initial encounter      oxyCODONE IR (ROXICODONE) 5 MG tablet Take 0.5 tablets (2.5 mg) by mouth every 4 hours as needed for moderate to severe pain  Qty: 10 tablet, Refills: 0    Associated Diagnoses: Pathological fracture of pelvis, unspecified pathological cause, initial encounter      polyethylene glycol (MIRALAX/GLYCOLAX) Packet Take 17 g by mouth daily as needed for constipation  Qty: 7 packet    Associated Diagnoses: Drug-induced constipation         CONTINUE these medications which have CHANGED    Details   traMADol (ULTRAM) 50 MG tablet Take 1 tablet (50 mg) by mouth every 6 hours as needed for severe pain  Qty: 30 tablet, Refills: 0    Associated Diagnoses: Malignant neoplasm of anal canal (H)         CONTINUE these medications which have NOT CHANGED    Details   acetaminophen (TYLENOL) 500 MG tablet Take 1,000 mg by mouth 3 times daily      calcium-vitamin D (CALTRATE) 600-400 MG-UNIT per tablet Take 1 tablet by mouth daily  Qty: 60 tablet    Associated Diagnoses: Age-related osteoporosis with current pathological fracture, initial encounter      ferrous sulfate (IRON) 325 (65 Fe) MG tablet Take 325 mg by mouth daily (with breakfast)      simethicone (MYLICON) 40 MG/0.6ML suspension Take 0.6 mLs (40 mg) by mouth every 6 hours as needed for cramping    Associated Diagnoses: Malignant neoplasm of anal canal (H); Flatulence, eructation and gas pain      vitamin B complex with vitamin C (VITAMIN  B COMPLEX) TABS tablet Take 1 tablet by mouth daily       VITAMIN E PO Take 1 capsule by mouth daily           Allergies   Allergies   Allergen Reactions     Darvon [Propoxyphene]      Had reaction in teen years     Penicillins      As a child     Ketoconazole Rash

## 2018-11-06 ENCOUNTER — TELEPHONE (OUTPATIENT)
Dept: FAMILY MEDICINE | Facility: CLINIC | Age: 78
End: 2018-11-06

## 2018-11-06 ENCOUNTER — PATIENT OUTREACH (OUTPATIENT)
Dept: CARE COORDINATION | Facility: CLINIC | Age: 78
End: 2018-11-06

## 2018-11-06 DIAGNOSIS — S72.009A FEMORAL NECK FRACTURE (H): Primary | ICD-10-CM

## 2018-11-06 LAB — ALP BONE SERPL-MCNC: 23.9 UG/L

## 2018-11-06 NOTE — PLAN OF CARE
Problem: PT General Care Plan  Goal: Bed Mobility (PT)  PT Bed Mobility   Physical Therapy Discharge Summary    Reason for therapy discharge:    Discharged to transitional care facility.    Progress towards therapy goal(s). See goals on Care Plan in [facility] electronic health record for goal details.  Goals partially met.  Barriers to achieving goals:   limited tolerance for therapy and discharge from facility.    Therapy recommendation(s):    Continued therapy is recommended.  Rationale/Recommendations:  below  baseline functional status.

## 2018-11-06 NOTE — PLAN OF CARE
Problem: Patient Care Overview  Goal: Plan of Care/Patient Progress Review  Occupational Therapy Discharge Summary    Reason for therapy discharge:    Discharged to transitional care facility.    Progress towards therapy goal(s). See goals on Care Plan in [facility] electronic health record for goal details.  Goals partially met.  Barriers to achieving goals:   discharge from facility.    Therapy recommendation(s):    Continued therapy is recommended.  Rationale/Recommendations:  Pt would benefit from continued therapy to progress safety and IND with I/ADLs and facilitate return to PLOF.

## 2018-11-06 NOTE — PROGRESS NOTES
Patient discharged to Care Facility   No post discharge call was made    Also contacted by another nurse

## 2018-11-06 NOTE — PROGRESS NOTES
Clinic Care Coordination Contact  Care Coordination Transition Communication    Referral Source: IP Report    Clinical Data: Patient was hospitalized at [facility] 11/1-11/5/2018- with diagnosis of   Discharge Diagnoses      1. S/p ground level fall 2 weeks prior   2. Traumatic right femoral neck fracture s/p right hemiarthroplasty 7/26/18  3. Acute traumatic pain       Other diagnoses:  1. Stage III anal cancer s/p Chemo and radiation   2. Limited mobility 2/2 # 1   3. Recent catheter related wound due to perianal radiation   4. Hx of subacute right sided rib fractures 8-10 (dx 3/18)  5. Peripheral edema   6. C. Diff diarrhea     .     Transition to Facility:              Facility Name: [facility]               Contact name and phone number/fax: [facility] [phone]    Plan: RN/SW Care Coordinator will await notification from facility staff informing RN/SW Care Coordinator of patient's discharge plans/needs. RN/SW Care Coordinator will review chart and outreach to facility staff every 4 weeks and as needed.     Quynh Mckeon RN / Clinical Care Coordinator     [facility] Services    [facility]   [email or web address]  Office :  [phone] / Fax :  [phone]

## 2018-11-06 NOTE — TELEPHONE ENCOUNTER
See care coordination encounter.  This encounter closed     Quynh Mckeon RN / Clinical Care Coordinator     [facility]   [email or web address]  Office :  [phone] / Fax :  [phone]

## 2018-11-07 LAB — COLLAGEN NTX SER-SCNC: 24.3 NM BCE

## 2018-11-15 ENCOUNTER — TELEPHONE (OUTPATIENT)
Dept: ORTHOPEDICS | Facility: CLINIC | Age: 78
End: 2018-11-15

## 2018-11-15 DIAGNOSIS — M25.551 HIP PAIN, RIGHT: Primary | ICD-10-CM

## 2018-11-15 NOTE — TELEPHONE ENCOUNTER
[facility] Call Center    Phone Message    May a detailed message be left on voicemail: yes    Reason for Call: Other: Pt's sister Sahil needing to verify if the pt comes to her appt tomorrow 11/16 if she still needs to come to her appt with Dr. Phan on 11/27     Action Taken: Message routed to:  [facility] ortho

## 2018-11-15 NOTE — TELEPHONE ENCOUNTER
Mae left that mostlikely she does not need to be seen 11/27/18.  The final determinent would be how her appt.  Goes tomorrow, and what Dr. Phan says.

## 2018-11-16 ENCOUNTER — OFFICE VISIT (OUTPATIENT)
Dept: ORTHOPEDICS | Facility: CLINIC | Age: 78
End: 2018-11-16
Payer: MEDICARE

## 2018-11-16 ENCOUNTER — RADIANT APPOINTMENT (OUTPATIENT)
Dept: GENERAL RADIOLOGY | Facility: CLINIC | Age: 78
End: 2018-11-16
Attending: ORTHOPAEDIC SURGERY
Payer: MEDICARE

## 2018-11-16 VITALS — WEIGHT: 116 LBS | HEIGHT: 64 IN | BODY MASS INDEX: 19.81 KG/M2

## 2018-11-16 DIAGNOSIS — M25.551 HIP PAIN, RIGHT: Primary | ICD-10-CM

## 2018-11-16 DIAGNOSIS — M25.551 HIP PAIN, RIGHT: ICD-10-CM

## 2018-11-16 NOTE — PROGRESS NOTES
Spine Surgery Return Clinic Visit      Chief Complaint:   F/u left rami fracture     Interval HPI:  Symptom Profile Including: location of symptoms, onset, severity, exacerbating/alleviating factors, previous treatments:        Elizabeth Taylor is a 78 year old female who has a history of right hemiarthroplasty for hip fracture in the past and then sustained left rami fracture 2 weeks ago with a fall.  She was hospitalized and then discharged to rehab.  She follows up today now 2 weeks out.  She states she was making good progress until she had a break in her therapy.  She again feels like she has begun to make progress again.  She notes muscle spasms in the anterior aspect of the hip which has improved with adjustments in her medications.  She is been ambulating with a walker. She has no pain in the right hip and is very happy with this.           Past Medical History:     Past Medical History:   Diagnosis Date     Anal cancer (H)      Endometriosis, site unspecified      Thyroiditis, unspecified     Thryoiditis-resolved            Past Surgical History:     Past Surgical History:   Procedure Laterality Date     APPENDECTOMY      as a child     ARTHROPLASTY HIP Right 7/26/2018    Procedure: ARTHROPLASTY HIP;  Right Hip Hemiarthroplasty;  Surgeon: Zaire Phan MD;  Location: [facility] OR     COLONOSCOPY N/A 4/13/2017    Procedure: COLONOSCOPY;  Surgeon: Juan Dos Santos MD;  Location: [facility] GI     INSERT PORT VASCULAR ACCESS Right 2/8/2018    Procedure: INSERT PORT VASCULAR ACCESS;  Place Single Lumen Venous Chest Port Placement;  Surgeon: Zaire Moreira PA-C;  Location: [facility] OR     SURGICAL HISTORY OF -       endometriosis            Social History:     Social History   Substance Use Topics     Smoking status: Never Smoker     Smokeless tobacco: Never Used     Alcohol use No            Family History:     Family History   Problem Relation Age of Onset     Cancer Sister      Cancer Maternal  Grandmother      lymphoma     HEART DISEASE Father      MI     Uterine Cancer Niece             Allergies:     Allergies   Allergen Reactions     Darvon [Propoxyphene]      Had reaction in teen years     Penicillins      As a child     Ketoconazole Rash            Medications:     Current Outpatient Prescriptions   Medication     acetaminophen (TYLENOL) 500 MG tablet     bisacodyl (DULCOLAX) 10 MG Suppository     calcium-vitamin D (CALTRATE) 600-400 MG-UNIT per tablet     cholecalciferol 5000 units CAPS     docusate sodium (COLACE) 100 MG capsule     ferrous sulfate (IRON) 325 (65 Fe) MG tablet     Lidocaine (LIDOCARE) 4 % Patch     methocarbamol (ROBAXIN) 500 MG tablet     oxyCODONE IR (ROXICODONE) 5 MG tablet     polyethylene glycol (MIRALAX/GLYCOLAX) Packet     simethicone (MYLICON) 40 MG/0.6ML suspension     traMADol (ULTRAM) 50 MG tablet     vitamin B complex with vitamin C (VITAMIN  B COMPLEX) TABS tablet     VITAMIN E PO     No current facility-administered medications for this visit.              Review of Systems:   A focused musculoskeletal and neurologic ROS was performed with pertinent positives and negatives noted in the HPI.  Additional systems were also reviewed and are documented at the bottom of the note.         Physical Exam:   Musculoskeletal, Neurologic, and Spine:   Focused examination of the lower extremities reveals no pain with gentle range of motion of the right hip.  She does have soreness and pain with gentle range of motion of the left hip.  Patient presents in a wheelchair and was not asked to ambulate.         Imaging:   We ordered and independently reviewed new radiographs at this clinic visit. The results were discussed with the patient. Findings include: X-rays of the pelvis and the right hip were obtained today these were used primarily to evaluate the left rami fracture.  There is a slight increase in the displacement of the superior rami fracture however this is most likely  rotational in nature.  No healing noted at this point.         Assessment and Plan:     78 year old female with Left LC 1 pelvic ring injury sustained 11/1/18    Patient returns today and her clinical symptoms and images were reviewed with her.  We discussed her injury and the expected progress to include significant improvements by week 3 and 4 from the injury.  She was reassured that her pain and muscle spasms are typical.  She should continue to work with therapy and advance her activity. We recommend she see her primary doctor in the next couple months to discuss a DEXA scan. Vitamin D and calcium supplementation are also recommended.   All questions were answered.    Patient was discussed with Dr. Phan.    Oskar Estrada MD  Orthopedic Surgery, PGY-4    Respectfully,  Zaire Phan MD  Spine Surgery  [facility]    I spoke with the resident about this patient but I do not personally see her today.  I reviewed the imaging studies.  As noted in the resident note there is slight shifting of the rami fracture.  I would like her to return to clinic in another 2-4 weeks with repeat inlet outlet and anterior pelvic views to make sure there is no further displacement.    Zaire Phan MD

## 2018-11-16 NOTE — NURSING NOTE
"Reason For Visit:   Chief Complaint   Patient presents with     RECHECK     left LC1 Pelvic ring injury       Ht 1.626 m (5' 4\")  Wt 52.6 kg (116 lb)  LMP  (LMP Unknown)  BMI 19.91 kg/m2         Brenda Chung ATC    "

## 2018-11-16 NOTE — MR AVS SNAPSHOT
After Visit Summary   11/16/2018    Elizabeth Taylor    MRN: [id number]           Patient Information     Date Of Birth          1940        Visit Information        Provider Department      11/16/2018 2:00 PM Zaire Phan MD [facility]         Follow-ups after your visit        Your next 10 appointments already scheduled     Nov 26, 2018 11:00 AM CST   (Arrive by 10:45 AM)   Return Visit with Emir Rosas MD   [facility] Colon [facility] Rectal Surgery ([facility])    [address] [place] [address]  4th Floor  [facility] [address]   [phone]            Nov 26, 2018 12:00 PM CST   [facility] Lab Draw with  [facility] LAB DRAW   [facility] Mason[facility] Lab Draw ([facility])    [address] [place] [address]  [address]  [facility] [address]   [phone]            Nov 26, 2018  1:20 PM CST   (Arrive by 1:05 PM)   Return Visit with HAIDER Avina   [facility] ([facility])    [address] [place] [address]  [address]  [facility] [address]   [phone]            Feb 22, 2019  9:00 AM CST   [facility] Lab Draw with  M[facility] LAB DRAW   [facility] Lab Draw ([facility])    [address] [place] [address]  [address]  [facility] [address]   [phone]            Feb 22, 2019  9:40 AM CST   CT ABDOMEN PELVIS W CONTRAST with UCCT1   [facility] Imaging [facility] CT ([facility])    [address] [place] [address]  1st Floor  [facility] [address]   [phone]           How do I prepare for my exam? (Food and drink instructions) To prepare: Do not eat or drink for 2 hours before your exam. If you need to take medicine, you may take it with small sips of water. (We may ask you to take liquid medicine as well.)  How do I prepare for my exam? (Other instructions) Please arrive 30 minutes early for your CT.  Once in the department you might be asked to  drink water 15-20 minutes prior to your exam.  If indicated you may be asked to drink an oral contrast in advance of your CT.  If this is the case, the imaging team will let you know or be in contact with you prior to your appointment  Patients over 70 or patients with diabetes or kidney problems: If you haven t had a blood test (creatinine test) within the last 30 days, the Cardiologist/Radiologist may require you to get this test prior to your exam.  If you have diabetes:  Continue to take your metformin medication on the day of your exam  What should I wear: Please wear loose clothing, such as a sweat suit or jogging clothes. Avoid snaps, zippers and other metal. We may ask you to undress and put on a hospital gown.  How long does the exam take: Most scans take less than 20 minutes.  What should I bring: Please bring any scans or X-rays taken at other hospitals, if similar tests were done. Also bring a list of your medicines, including vitamins, minerals and over-the-counter drugs. It is safest to leave personal items at home.  Do I need a : No  is needed.  What do I need to tell my doctor? Be sure to tell your doctor: * If you have any allergies. * If there s any chance you are pregnant. * If you are breastfeeding.  What should I do after the exam: No restrictions, You may resume normal activities.  What is this test: A CT (computed tomography) scan is a series of pictures that allows us to look inside your body. The scanner creates images of the body in cross sections, much like slices of bread. This helps us see any problems more clearly. You may receive contrast (X-ray dye) before or during your scan. You will be asked to drink the contrast.  Who should I call with questions: If you have any questions, please call the Imaging Department where you will have your exam. Directions, parking instructions, and other information is available on our website, [email or web address].            Feb 22, 2019  "12:30 PM CST   (Arrive by 12:15 PM)   Return Visit with Shen Ray MD   [facility] ([facility])    [address]  [address]  [facility] [id number] [phone]            Feb 22, 2019  2:00 PM CST   Return Visit with Zaire Madison MD   Radiation Oncology Clinic ([facility])    [facility]  1st Floor  [address]  [facility] [id number] [phone]              Who to contact     Please call your clinic at [phone] to:    Ask questions about your health    Make or cancel appointments    Discuss your medicines    Learn about your test results    Speak to your doctor            Additional Information About Your Visit        [employer] Information     [employer] gives you secure access to your electronic health record. If you see a primary care provider, you can also send messages to your care team and make appointments. If you have questions, please call your primary care clinic.  If you do not have a primary care provider, please call [phone] and they will assist you.      [employer] is an electronic gateway that provides easy, online access to your medical records. With [employer], you can request a clinic appointment, read your test results, renew a prescription or communicate with your care team.     To access your existing account, please contact your [facility] or call [phone] for assistance.        Care EveryWhere ID     This is your Care EveryWhere ID. This could be used by other organizations to access your [facility] medical records  [id number]        Your Vitals Were     Height Last Period BMI (Body Mass Index)             1.626 m (5' 4\") (LMP Unknown) 19.91 kg/m2          Blood Pressure from Last 3 Encounters:   11/05/18 117/60   10/22/18 125/74   10/16/18 110/74    Weight from Last 3 Encounters:   11/16/18 52.6 kg (116 lb)   11/01/18 54.4 kg (120 lb) "   10/22/18 53 kg (116 lb 12.8 oz)              Today, you had the following     No orders found for display       Primary Care Provider Office Phone # Fax #    Baron Seth [phone] [address]  [facility] [address]        Equal Access to Services     IONA DALEY : Hadii aad ku hadasho Soomaali, waaxda luqadaha, qaybta kaalmada adeegyada, waxay toreyin haynatalian adeargentina padminishirley catalan. So [facility] [phone].    ATENCIÓN: Si habla español, tiene a ghosh disposición servicios gratuitos de asistencia lingüística. [facility] [phone].    We comply with applicable federal civil rights laws and [place] laws. We do not discriminate on the basis of race, color, national origin, age, disability, sex, sexual orientation, or gender identity.            Thank you!     Thank you for choosing [facility]  for your care. Our goal is always to provide you with excellent care. Hearing back from our patients is one way we can continue to improve our services. Please take a few minutes to complete the written survey that you may receive in the mail after your visit with us. Thank you!             Your Updated Medication List - Protect others around you: Learn how to safely use, store and throw away your medicines at www.disposemymeds.org.          This list is accurate as of 11/16/18  2:22 PM.  Always use your most recent med list.                   Brand Name Dispense Instructions for use Diagnosis    acetaminophen 500 MG tablet    TYLENOL     Take 1,000 mg by mouth 3 times daily        bisacodyl 10 MG Suppository    DULCOLAX    30 suppository    Place 1 suppository (10 mg) rectally daily as needed for constipation    Drug-induced constipation       calcium carbonate 600 mg-vitamin D 400 units 600-400 MG-UNIT per tablet    CALTRATE    60 tablet    Take 1 tablet by mouth daily    Age-related osteoporosis with current pathological fracture, initial encounter       cholecalciferol 5000 units Caps       Take 1 capsule (5,000 Units) by mouth daily    Age-related osteoporosis with current pathological fracture, initial encounter, Pathological fracture of pelvis, unspecified pathological cause, initial encounter       docusate sodium 100 MG capsule    COLACE    60 capsule    Take 1 capsule (100 mg) by mouth 2 times daily    Drug-induced constipation       ferrous sulfate 325 (65 Fe) MG tablet    IRON     Take 325 mg by mouth daily (with breakfast)        Lidocaine 4 % Patch    LIDOCARE     Place 2 patches onto the skin every 24 hours    Pathological fracture of pelvis, unspecified pathological cause, initial encounter       methocarbamol 500 MG tablet    ROBAXIN    120 tablet    Take 1 tablet (500 mg) by mouth every 6 hours as needed for muscle spasms    Pathological fracture of pelvis, unspecified pathological cause, initial encounter       oxyCODONE IR 5 MG tablet    ROXICODONE    10 tablet    Take 0.5 tablets (2.5 mg) by mouth every 4 hours as needed for moderate to severe pain    Pathological fracture of pelvis, unspecified pathological cause, initial encounter       polyethylene glycol Packet    MIRALAX/GLYCOLAX    7 packet    Take 17 g by mouth daily as needed for constipation    Drug-induced constipation       simethicone 40 MG/0.6ML suspension    MYLICON     Take 0.6 mLs (40 mg) by mouth every 6 hours as needed for cramping    Malignant neoplasm of anal canal (H), Flatulence, eructation and gas pain       traMADol 50 MG tablet    ULTRAM    30 tablet    Take 1 tablet (50 mg) by mouth every 6 hours as needed for severe pain    Malignant neoplasm of anal canal (H)       vitamin B complex with vitamin C Tabs tablet      Take 1 tablet by mouth daily        VITAMIN E PO      Take 1 capsule by mouth daily

## 2018-11-16 NOTE — LETTER
11/16/2018      RE: Elizabeth Taylor  [address]  [facility] [address]       Spine Surgery Return Clinic Visit      Chief Complaint:   F/u left rami fracture     Interval HPI:  Symptom Profile Including: location of symptoms, onset, severity, exacerbating/alleviating factors, previous treatments:        Elizabeth Taylor is a 78 year old female who has a history of right hemiarthroplasty for hip fracture in the past and then sustained left rami fracture 2 weeks ago with a fall.  She was hospitalized and then discharged to rehab.  She follows up today now 2 weeks out.  She states she was making good progress until she had a break in her therapy.  She again feels like she has begun to make progress again.  She notes muscle spasms in the anterior aspect of the hip which has improved with adjustments in her medications.  She is been ambulating with a walker. She has no pain in the right hip and is very happy with this.           Past Medical History:     Past Medical History:   Diagnosis Date     Anal cancer (H)      Endometriosis, site unspecified      Thyroiditis, unspecified     Thryoiditis-resolved            Past Surgical History:     Past Surgical History:   Procedure Laterality Date     APPENDECTOMY      as a child     ARTHROPLASTY HIP Right 7/26/2018    Procedure: ARTHROPLASTY HIP;  Right Hip Hemiarthroplasty;  Surgeon: Zaire Phan MD;  Location: [facility] OR     COLONOSCOPY N/A 4/13/2017    Procedure: COLONOSCOPY;  Surgeon: Juan Dos Santos MD;  Location: [facility] GI     INSERT PORT VASCULAR ACCESS Right 2/8/2018    Procedure: INSERT PORT VASCULAR ACCESS;  Place Single Lumen Venous Chest Port Placement;  Surgeon: Zaire Moreira PA-C;  Location: [facility] OR     SURGICAL HISTORY OF -       endometriosis            Social History:     Social History   Substance Use Topics     Smoking status: Never Smoker     Smokeless tobacco: Never Used     Alcohol use No            Family  History:     Family History   Problem Relation Age of Onset     Cancer Sister      Cancer Maternal Grandmother      lymphoma     HEART DISEASE Father      MI     Uterine Cancer Niece             Allergies:     Allergies   Allergen Reactions     Darvon [Propoxyphene]      Had reaction in teen years     Penicillins      As a child     Ketoconazole Rash            Medications:     Current Outpatient Prescriptions   Medication     acetaminophen (TYLENOL) 500 MG tablet     bisacodyl (DULCOLAX) 10 MG Suppository     calcium-vitamin D (CALTRATE) 600-400 MG-UNIT per tablet     cholecalciferol 5000 units CAPS     docusate sodium (COLACE) 100 MG capsule     ferrous sulfate (IRON) 325 (65 Fe) MG tablet     Lidocaine (LIDOCARE) 4 % Patch     methocarbamol (ROBAXIN) 500 MG tablet     oxyCODONE IR (ROXICODONE) 5 MG tablet     polyethylene glycol (MIRALAX/GLYCOLAX) Packet     simethicone (MYLICON) 40 MG/0.6ML suspension     traMADol (ULTRAM) 50 MG tablet     vitamin B complex with vitamin C (VITAMIN  B COMPLEX) TABS tablet     VITAMIN E PO     No current facility-administered medications for this visit.              Review of Systems:   A focused musculoskeletal and neurologic ROS was performed with pertinent positives and negatives noted in the HPI.  Additional systems were also reviewed and are documented at the bottom of the note.         Physical Exam:   Musculoskeletal, Neurologic, and Spine:   Focused examination of the lower extremities reveals no pain with gentle range of motion of the right hip.  She does have soreness and pain with gentle range of motion of the left hip.  Patient presents in a wheelchair and was not asked to ambulate.         Imaging:   We ordered and independently reviewed new radiographs at this clinic visit. The results were discussed with the patient. Findings include: X-rays of the pelvis and the right hip were obtained today these were used primarily to evaluate the left rami fracture.  There is a  slight increase in the displacement of the superior rami fracture however this is most likely rotational in nature.  No healing noted at this point.         Assessment and Plan:     78 year old female with Left LC 1 pelvic ring injury sustained 11/1/18    Patient returns today and her clinical symptoms and images were reviewed with her.  We discussed her injury and the expected progress to include significant improvements by week 3 and 4 from the injury.  She was reassured that her pain and muscle spasms are typical.  She should continue to work with therapy and advance her activity. We recommend she see her primary doctor in the next couple months to discuss a DEXA scan. Vitamin D and calcium supplementation are also recommended.   All questions were answered.    Patient was discussed with Dr. Phan.    Oskar Estrada MD  Orthopedic Surgery, PGY-4    Respectfully,  Zaire Phan MD  Spine Surgery  [facility]    I spoke with the resident about this patient but I do not personally see her today.  I reviewed the imaging studies.  As noted in the resident note there is slight shifting of the rami fracture.  I would like her to return to clinic in another 2-4 weeks with repeat inlet outlet and anterior pelvic views to make sure there is no further displacement.    MD Zaire Reid MD

## 2018-11-16 NOTE — LETTER
11/16/2018      RE: Elizabeth Taylor  [address]  [facility] [address]       Spine Surgery Return Clinic Visit      Chief Complaint:   F/u left rami fracture     Interval HPI:  Symptom Profile Including: location of symptoms, onset, severity, exacerbating/alleviating factors, previous treatments:        Elizabeth Taylro is a 78 year old female who has a history of right hemiarthroplasty for hip fracture in the past and then sustained left rami fracture 2 weeks ago with a fall.  She was hospitalized and then discharged to rehab.  She follows up today now 2 weeks out.  She states she was making good progress until she had a break in her therapy.  She again feels like she has begun to make progress again.  She notes muscle spasms in the anterior aspect of the hip which has improved with adjustments in her medications.  She is been ambulating with a walker. She has no pain in the right hip and is very happy with this.           Past Medical History:     Past Medical History:   Diagnosis Date     Anal cancer (H)      Endometriosis, site unspecified      Thyroiditis, unspecified     Thryoiditis-resolved            Past Surgical History:     Past Surgical History:   Procedure Laterality Date     APPENDECTOMY      as a child     ARTHROPLASTY HIP Right 7/26/2018    Procedure: ARTHROPLASTY HIP;  Right Hip Hemiarthroplasty;  Surgeon: Zaire Phan MD;  Location: [facility] OR     COLONOSCOPY N/A 4/13/2017    Procedure: COLONOSCOPY;  Surgeon: Juan Dos Santos MD;  Location: [facility] GI     INSERT PORT VASCULAR ACCESS Right 2/8/2018    Procedure: INSERT PORT VASCULAR ACCESS;  Place Single Lumen Venous Chest Port Placement;  Surgeon: Zaire Moreira PA-C;  [facility]     SURGICAL HISTORY OF -       endometriosis            Social History:     Social History   Substance Use Topics     Smoking status: Never Smoker     Smokeless tobacco: Never Used     Alcohol use No            Family  History:     Family History   Problem Relation Age of Onset     Cancer Sister      Cancer Maternal Grandmother      lymphoma     HEART DISEASE Father      MI     Uterine Cancer Niece             Allergies:     Allergies   Allergen Reactions     Darvon [Propoxyphene]      Had reaction in teen years     Penicillins      As a child     Ketoconazole Rash            Medications:     Current Outpatient Prescriptions   Medication     acetaminophen (TYLENOL) 500 MG tablet     bisacodyl (DULCOLAX) 10 MG Suppository     calcium-vitamin D (CALTRATE) 600-400 MG-UNIT per tablet     cholecalciferol 5000 units CAPS     docusate sodium (COLACE) 100 MG capsule     ferrous sulfate (IRON) 325 (65 Fe) MG tablet     Lidocaine (LIDOCARE) 4 % Patch     methocarbamol (ROBAXIN) 500 MG tablet     oxyCODONE IR (ROXICODONE) 5 MG tablet     polyethylene glycol (MIRALAX/GLYCOLAX) Packet     simethicone (MYLICON) 40 MG/0.6ML suspension     traMADol (ULTRAM) 50 MG tablet     vitamin B complex with vitamin C (VITAMIN  B COMPLEX) TABS tablet     VITAMIN E PO     No current facility-administered medications for this visit.              Review of Systems:   A focused musculoskeletal and neurologic ROS was performed with pertinent positives and negatives noted in the HPI.  Additional systems were also reviewed and are documented at the bottom of the note.         Physical Exam:   Musculoskeletal, Neurologic, and Spine:   Focused examination of the lower extremities reveals no pain with gentle range of motion of the right hip.  She does have soreness and pain with gentle range of motion of the left hip.  Patient presents in a wheelchair and was not asked to ambulate.         Imaging:   We ordered and independently reviewed new radiographs at this clinic visit. The results were discussed with the patient. Findings include: X-rays of the pelvis and the right hip were obtained today these were used primarily to evaluate the left rami fracture.  There is a  slight increase in the displacement of the superior rami fracture however this is most likely rotational in nature.  No healing noted at this point.         Assessment and Plan:     78 year old female with Left LC 1 pelvic ring injury sustained 11/1/18    Patient returns today and her clinical symptoms and images were reviewed with her.  We discussed her injury and the expected progress to include significant improvements by week 3 and 4 from the injury.  She was reassured that her pain and muscle spasms are typical.  She should continue to work with therapy and advance her activity. We recommend she see her primary doctor in the next couple months to discuss a DEXA scan. Vitamin D and calcium supplementation are also recommended.   All questions were answered.    Patient was discussed with Dr. Phan.    Oskar Estrada MD  Orthopedic Surgery, PGY-4    Respectfully,  Zaire Phan MD  Spine Surgery  [facility]    I spoke with the resident about this patient but I do not personally see her today.  I reviewed the imaging studies.  As noted in the resident note there is slight shifting of the rami fracture.  I would like her to return to clinic in another 2-4 weeks with repeat inlet outlet and anterior pelvic views to make sure there is no further displacement.    MD Zaire Reid MD

## 2018-11-16 NOTE — LETTER
11/16/2018       RE: Elizabeth Taylor  [address]  [facility] [address]     Dear Colleague,    Thank you for referring your patient, Elizabeth Taylor, to the [facility]. Please see a copy of my visit note below.    Spine Surgery Return Clinic Visit      Chief Complaint:   F/u left rami fracture     Interval HPI:  Symptom Profile Including: location of symptoms, onset, severity, exacerbating/alleviating factors, previous treatments:        Elizabeth Taylor is a 78 year old female who has a history of right hemiarthroplasty for hip fracture in the past and then sustained left rami fracture 2 weeks ago with a fall.  She was hospitalized and then discharged to rehab.  She follows up today now 2 weeks out.  She states she was making good progress until she had a break in her therapy.  She again feels like she has begun to make progress again.  She notes muscle spasms in the anterior aspect of the hip which has improved with adjustments in her medications.  She is been ambulating with a walker. She has no pain in the right hip and is very happy with this.           Past Medical History:     Past Medical History:   Diagnosis Date     Anal cancer (H)      Endometriosis, site unspecified      Thyroiditis, unspecified     Thryoiditis-resolved            Past Surgical History:     Past Surgical History:   Procedure Laterality Date     APPENDECTOMY      as a child     ARTHROPLASTY HIP Right 7/26/2018    Procedure: ARTHROPLASTY HIP;  Right Hip Hemiarthroplasty;  Surgeon: Zaire Phan MD;  Location: [facility]     COLONOSCOPY N/A 4/13/2017    Procedure: COLONOSCOPY;  Surgeon: Juan Dos Santos MD;  Location: [facility]     INSERT PORT VASCULAR ACCESS Right 2/8/2018    Procedure: INSERT PORT VASCULAR ACCESS;  Place Single Lumen Venous Chest Port Placement;  Surgeon: Zaire Moreira PA-C;  Location:  OR     SURGICAL  HISTORY OF -       endometriosis            Social History:     Social History   Substance Use Topics     Smoking status: Never Smoker     Smokeless tobacco: Never Used     Alcohol use No            Family History:     Family History   Problem Relation Age of Onset     Cancer Sister      Cancer Maternal Grandmother      lymphoma     HEART DISEASE Father      MI     Uterine Cancer Niece             Allergies:     Allergies   Allergen Reactions     Darvon [Propoxyphene]      Had reaction in teen years     Penicillins      As a child     Ketoconazole Rash            Medications:     Current Outpatient Prescriptions   Medication     acetaminophen (TYLENOL) 500 MG tablet     bisacodyl (DULCOLAX) 10 MG Suppository     calcium-vitamin D (CALTRATE) 600-400 MG-UNIT per tablet     cholecalciferol 5000 units CAPS     docusate sodium (COLACE) 100 MG capsule     ferrous sulfate (IRON) 325 (65 Fe) MG tablet     Lidocaine (LIDOCARE) 4 % Patch     methocarbamol (ROBAXIN) 500 MG tablet     oxyCODONE IR (ROXICODONE) 5 MG tablet     polyethylene glycol (MIRALAX/GLYCOLAX) Packet     simethicone (MYLICON) 40 MG/0.6ML suspension     traMADol (ULTRAM) 50 MG tablet     vitamin B complex with vitamin C (VITAMIN  B COMPLEX) TABS tablet     VITAMIN E PO     No current facility-administered medications for this visit.              Review of Systems:   A focused musculoskeletal and neurologic ROS was performed with pertinent positives and negatives noted in the HPI.  Additional systems were also reviewed and are documented at the bottom of the note.         Physical Exam:   Musculoskeletal, Neurologic, and Spine:   Focused examination of the lower extremities reveals no pain with gentle range of motion of the right hip.  She does have soreness and pain with gentle range of motion of the left hip.  Patient presents in a wheelchair and was not asked to ambulate.         Imaging:   We ordered and independently reviewed new radiographs at this  clinic visit. The results were discussed with the patient. Findings include: X-rays of the pelvis and the right hip were obtained today these were used primarily to evaluate the left rami fracture.  There is a slight increase in the displacement of the superior rami fracture however this is most likely rotational in nature.  No healing noted at this point.         Assessment and Plan:     78 year old female with Left LC 1 pelvic ring injury sustained 11/1/18    Patient returns today and her clinical symptoms and images were reviewed with her.  We discussed her injury and the expected progress to include significant improvements by week 3 and 4 from the injury.  She was reassured that her pain and muscle spasms are typical.  She should continue to work with therapy and advance her activity. We recommend she see her primary doctor in the next couple months to discuss a DEXA scan. Vitamin D and calcium supplementation are also recommended.   All questions were answered.    Patient was discussed with Dr. Phan.    Oskar Estrada MD  Orthopedic Surgery, PGY-4    Respectfully,  Zaire Phan MD  Spine Surgery  [facility]    I spoke with the resident about this patient but I do not personally see her today.  I reviewed the imaging studies.  As noted in the resident note there is slight shifting of the rami fracture.  I would like her to return to clinic in another 2-4 weeks with repeat inlet outlet and anterior pelvic views to make sure there is no further displacement.    Zaire Phan MD    Again, thank you for allowing me to participate in the care of your patient.      Sincerely,    Zaire Phan MD

## 2018-11-20 NOTE — PROGRESS NOTES
"Colon and Rectal Surgery Follow-up Clinic Note      RE: Elizabeth Taylor  : 1940  [place]: 2018     DIAGNOSIS: mT2N1 anal canal squamous cell carcinoma (s/p CXRT [5,400 cGy], completed on 3/23/18).    INTERVAL HISTORY: Denies increased pain, fevers, or chills. Tolerating diet well. Having 2-3 BM's per day with fecal urgency and some seepage but denies incontinence or BPR.      Physical Examination:  /63 (BP Location: Left arm, Patient Position: Sitting, Cuff Size: Adult Regular)  Pulse 82  Ht 5' 4\"  LMP  (LMP Unknown)  BMI 19.91 kg/m2  Abdomen soft, NT. No inguinal adenopathy palpated.  Perianal skin with mild excoriation. Mild perianal hyperpigmentation and induration. No evidence of active infection.  BRITTNEY: no lesions palpated.  Anoscopy: Was performed.   Hemorrhoids: No significant internal hemorrhoids.  Lesions: No. White scar visualized at left anterior aspect of anal canal.    ASSESSMENT  Positive response to CXRT.     CEA: 2.0.    Interval imaging with PET-CT and MR pelvis on 18:    IMPRESSION:   In this patient with a history of anal cancer status post chemotherapy  and radiation:  1. Decrease in FDG uptake at the site of the primary anal mass. The  mass is poorly defined on the accompanying CT. This is consistent with  response to therapy.  2. No significant residual increased FDG uptake in the left inguinal  nodes.  3. Small focus of uptake adjacent to the urethra without an  accompanying lesion on the CT. This is most suspicious for a urethral  contamination, possibly within a urethral diverticulum or a small  amount of retained excreted urine tracking up into the vaginal fornix.  Recommend attention on follow-up imaging, although alternatively a  repeat MRI of the pelvis may help increase confidence that this  finding does not represent small lesion.  4. Stable sub-5 mm indeterminate pulmonary nodules.  5. Improvement in trace amount of infiltrative free fluid in " the  pelvis.  6. Decrease in now small right greater than left pleural effusions,  with resolution of the previously visualized right pneumothorax.  7. Incidental findings include chronic healing rib fractures,  asymmetric right paraspinal muscular uptake, cholelithiasis, and  dilated lower extremity superficial veins.  8. Redemonstration of the low-density cyst in the left adnexa likely  an ovarian cyst, without increased FDG uptake. Continued attention on  follow-up imaging is recommended.    IMPRESSION:   1. Interval near resolution of the patient's known left anal mass  lesion.  2. No evidence for recurrent or metastatic disease in the pelvis.  2a. Please also refer to same day PET/CT exam report.  3. Stable left ovarian cysts. Recommend monitoring at least on annual  basis (on routine anal cancer restaging follow-up or ultrasound pelvis  if at a later date restaging is no longer required).    PLAN  1. RTC in 6 months for anoscopy.  2. 3T MR pelvis in 3/2019.   3. PET/CT in 5/2019.  4. Colonoscopy in 2020.     Time spent: 30 minutes.  >50% spent in discussing, counseling and coordinating care.    Emir Rosas M.D., M.Sc.     Division of Colon and Rectal Surgery  [facility]    Referring Provider:  Felisha Davis, CHAYITO  [address] [place] [address]     CC:  MD Zaire Mcleod MD

## 2018-11-26 ENCOUNTER — ONCOLOGY VISIT (OUTPATIENT)
Dept: ONCOLOGY | Facility: CLINIC | Age: 78
End: 2018-11-26
Attending: INTERNAL MEDICINE
Payer: MEDICARE

## 2018-11-26 ENCOUNTER — OFFICE VISIT (OUTPATIENT)
Dept: SURGERY | Facility: CLINIC | Age: 78
End: 2018-11-26
Payer: MEDICARE

## 2018-11-26 ENCOUNTER — APPOINTMENT (OUTPATIENT)
Dept: LAB | Facility: CLINIC | Age: 78
End: 2018-11-26
Attending: INTERNAL MEDICINE
Payer: MEDICARE

## 2018-11-26 VITALS
HEART RATE: 82 BPM | SYSTOLIC BLOOD PRESSURE: 102 MMHG | BODY MASS INDEX: 19.91 KG/M2 | HEIGHT: 64 IN | DIASTOLIC BLOOD PRESSURE: 63 MMHG

## 2018-11-26 VITALS
WEIGHT: 121.7 LBS | RESPIRATION RATE: 16 BRPM | SYSTOLIC BLOOD PRESSURE: 115 MMHG | HEIGHT: 64 IN | BODY MASS INDEX: 20.78 KG/M2 | OXYGEN SATURATION: 96 % | DIASTOLIC BLOOD PRESSURE: 73 MMHG | TEMPERATURE: 98.5 F | HEART RATE: 69 BPM

## 2018-11-26 DIAGNOSIS — M25.551 HIP PAIN, RIGHT: Primary | ICD-10-CM

## 2018-11-26 DIAGNOSIS — C21.1 MALIGNANT NEOPLASM OF ANAL CANAL (H): Primary | ICD-10-CM

## 2018-11-26 LAB
ALBUMIN SERPL-MCNC: 3.3 G/DL (ref 3.4–5)
ALP SERPL-CCNC: 124 U/L (ref 40–150)
ALT SERPL W P-5'-P-CCNC: 18 U/L (ref 0–50)
ANION GAP SERPL CALCULATED.3IONS-SCNC: 6 MMOL/L (ref 3–14)
AST SERPL W P-5'-P-CCNC: 17 U/L (ref 0–45)
BASOPHILS # BLD AUTO: 0 10E9/L (ref 0–0.2)
BASOPHILS NFR BLD AUTO: 0.5 %
BILIRUB SERPL-MCNC: 0.5 MG/DL (ref 0.2–1.3)
BUN SERPL-MCNC: 27 MG/DL (ref 7–30)
CALCIUM SERPL-MCNC: 8.8 MG/DL (ref 8.5–10.1)
CHLORIDE SERPL-SCNC: 105 MMOL/L (ref 94–109)
CO2 SERPL-SCNC: 29 MMOL/L (ref 20–32)
CREAT SERPL-MCNC: 0.61 MG/DL (ref 0.52–1.04)
DIFFERENTIAL METHOD BLD: ABNORMAL
EOSINOPHIL # BLD AUTO: 0.2 10E9/L (ref 0–0.7)
EOSINOPHIL NFR BLD AUTO: 2.4 %
ERYTHROCYTE [DISTWIDTH] IN BLOOD BY AUTOMATED COUNT: 16.1 % (ref 10–15)
GFR SERPL CREATININE-BSD FRML MDRD: >90 ML/MIN/1.7M2
GLUCOSE SERPL-MCNC: 75 MG/DL (ref 70–99)
HCT VFR BLD AUTO: 39 % (ref 35–47)
HGB BLD-MCNC: 12.1 G/DL (ref 11.7–15.7)
IMM GRANULOCYTES # BLD: 0 10E9/L (ref 0–0.4)
IMM GRANULOCYTES NFR BLD: 0.4 %
LYMPHOCYTES # BLD AUTO: 1 10E9/L (ref 0.8–5.3)
LYMPHOCYTES NFR BLD AUTO: 13.6 %
MCH RBC QN AUTO: 27.4 PG (ref 26.5–33)
MCHC RBC AUTO-ENTMCNC: 31 G/DL (ref 31.5–36.5)
MCV RBC AUTO: 88 FL (ref 78–100)
MONOCYTES # BLD AUTO: 0.8 10E9/L (ref 0–1.3)
MONOCYTES NFR BLD AUTO: 10.3 %
NEUTROPHILS # BLD AUTO: 5.4 10E9/L (ref 1.6–8.3)
NEUTROPHILS NFR BLD AUTO: 72.8 %
NRBC # BLD AUTO: 0 10*3/UL
NRBC BLD AUTO-RTO: 0 /100
PLATELET # BLD AUTO: 247 10E9/L (ref 150–450)
POTASSIUM SERPL-SCNC: 4.2 MMOL/L (ref 3.4–5.3)
PROT SERPL-MCNC: 7.1 G/DL (ref 6.8–8.8)
RBC # BLD AUTO: 4.42 10E12/L (ref 3.8–5.2)
SODIUM SERPL-SCNC: 140 MMOL/L (ref 133–144)
WBC # BLD AUTO: 7.5 10E9/L (ref 4–11)

## 2018-11-26 PROCEDURE — 85025 COMPLETE CBC W/AUTO DIFF WBC: CPT | Performed by: PHYSICIAN ASSISTANT

## 2018-11-26 PROCEDURE — 25000128 H RX IP 250 OP 636: Mod: ZF | Performed by: PHYSICIAN ASSISTANT

## 2018-11-26 PROCEDURE — 80053 COMPREHEN METABOLIC PANEL: CPT | Performed by: PHYSICIAN ASSISTANT

## 2018-11-26 PROCEDURE — 99214 OFFICE O/P EST MOD 30 MIN: CPT | Mod: ZP | Performed by: PHYSICIAN ASSISTANT

## 2018-11-26 PROCEDURE — 36591 DRAW BLOOD OFF VENOUS DEVICE: CPT

## 2018-11-26 PROCEDURE — G0463 HOSPITAL OUTPT CLINIC VISIT: HCPCS | Mod: ZF

## 2018-11-26 RX ORDER — HEPARIN SODIUM (PORCINE) LOCK FLUSH IV SOLN 100 UNIT/ML 100 UNIT/ML
5 SOLUTION INTRAVENOUS
Status: COMPLETED | OUTPATIENT
Start: 2018-11-26 | End: 2018-11-26

## 2018-11-26 RX ADMIN — SODIUM CHLORIDE, PRESERVATIVE FREE 5 ML: 5 INJECTION INTRAVENOUS at 12:49

## 2018-11-26 ASSESSMENT — PAIN SCALES - GENERAL: PAINLEVEL: MILD PAIN (3)

## 2018-11-26 NOTE — LETTER
11/26/2018      RE: Elizabeth Taylor  [address]  [facility] [address]       Oncology/Hematology Visit Note  Nov 26, 2018    Reason for Visit: Follow up of anal canal SCC    History of Present Illness: Elizabeth Taylor is a 78 year old female with a history of stage [id number] SCC anal canal. Her oncologic history is as follows. She developed bright red blood per rectum started about 4 years ago and progressively got worse. She underwent a colonoscopy in April 2017 which noted to have small anal fissure along with internal hemorrhoids. Symptoms continued and  she was referred to colorectal surgery for further evaluation. On exam she was found to have an 1.5 cm anal lesion on the left side along with left groin palpable adenopathy. Biopsy of anal mass came back positive for squamous cell carcinoma. Patient underwent staging workup with MRI pelvis and a PET scan- showed PET avid left anal canal mass along with small left inguinal FDG avid lymph nodes.      02/12/18- Started on concurrent chemoradiation with 5FU/Mitomycin     03/13/18 Skipped Mitomycin and proceeded with 5FU at reduced dose given neutropenia/old age     03/19/18 -04/04/18 admitted to the [facility] with intractable diarrhea, nausea, generalized weakness and fall at home resulting in multiple right rib fractures. Hospital course was complicated by small bowel obstruction for which patient was started on TPN. Bowel obstruction was managed conservatively eventually improved prior to discharge . 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 She was eventually discharged to transitional care unit.     05/25/18 05/25/18 MRI pelvis and PET scan done showed near complete resolution of patient's known anal lesion with no evidence for recurrence or metastatic disease.     07/25/18-07/30/18 admitted to the hospital with traumatic right femoral neck fracture secondary to fall. She  underwent right hemiarthroplasty and was discharged to transitional care facility. Also noted to have a C. difficile infection and was started on oral vancomycin course.    11/1/18-11/5/18 admitted to hospital with a fall and left hip pain found to have pelvic ring fracture. Treated conservatively with pain control, PT/OT, and recommendations for PCP follow-up with DEXA scan and osteoporosis work-up.     She returns today for ongoing surveillance after treatment.    Interval History:  Ms. Taylor returns to clinic today with her friend. Overall she is feeling well. She has had two falls with fractures and is now at a TCU which she states is going well. Pain is improving and she is starting to be more active. She states she saw someone for her osteoporosis who recommended bisphosphonate but for now she is just doing Vit D.     No fevers. No chest pain or SOB though she does have heart burn. She states she has vomited twice this last week due to pain from muscles spasms in her leg and frustration with staff regarding muscle relaxants, but otherwise denies N/V. She does have mild abdominal discomfort with bowel movements but otherwise no abdominal pain. No rectal pain. Bowel movements formed and regular after each meal, though she does still have urgency with stooling after eating. Unclear about bleeding (vaginal vs rectal) though overall she states is she has had bleeding it has been minimal. No urinary concerns. She is eating really well.     She does wonder about pain in her left shoulder and neuropathy in her left pointer and middle finger the last week or so. She is also still fatigued. She just started her vaginal dilator per rad onc recommendations.    Current Outpatient Prescriptions   Medication Sig Dispense Refill     acetaminophen (TYLENOL) 500 MG tablet Take 1,000 mg by mouth 3 times daily       bisacodyl (DULCOLAX) 10 MG Suppository Place 1 suppository (10 mg) rectally daily as needed for constipation 30  "suppository      calcium-vitamin D (CALTRATE) 600-400 MG-UNIT per tablet Take 1 tablet by mouth daily 60 tablet      cholecalciferol 5000 units CAPS Take 1 capsule (5,000 Units) by mouth daily       docusate sodium (COLACE) 100 MG capsule Take 1 capsule (100 mg) by mouth 2 times daily 60 capsule      ferrous sulfate (IRON) 325 (65 Fe) MG tablet Take 325 mg by mouth daily (with breakfast)       Lidocaine (LIDOCARE) 4 % Patch Place 2 patches onto the skin every 24 hours       methocarbamol (ROBAXIN) 500 MG tablet Take 1 tablet (500 mg) by mouth every 6 hours as needed for muscle spasms 120 tablet      oxyCODONE IR (ROXICODONE) 5 MG tablet Take 0.5 tablets (2.5 mg) by mouth every 4 hours as needed for moderate to severe pain 10 tablet 0     polyethylene glycol (MIRALAX/GLYCOLAX) Packet Take 17 g by mouth daily as needed for constipation 7 packet      simethicone (MYLICON) 40 MG/0.6ML suspension Take 0.6 mLs (40 mg) by mouth every 6 hours as needed for cramping       traMADol (ULTRAM) 50 MG tablet Take 1 tablet (50 mg) by mouth every 6 hours as needed for severe pain 30 tablet 0     vitamin B complex with vitamin C (VITAMIN  B COMPLEX) TABS tablet Take 1 tablet by mouth daily       VITAMIN E PO Take 1 capsule by mouth daily         Physical Examination:  /73 (BP Location: Right arm, Patient Position: Sitting, Cuff Size: Adult Regular)  Pulse 69  Temp 98.5  F (36.9  C) (Oral)  Resp 16  Ht 1.626 m (5' 4.02\")  Wt 55.2 kg (121 lb 11.2 oz)  LMP  (LMP Unknown)  SpO2 96%  BMI 20.88 kg/m2  Wt Readings from Last 10 Encounters:   11/16/18 52.6 kg (116 lb)   11/01/18 54.4 kg (120 lb)   10/22/18 53 kg (116 lb 12.8 oz)   09/21/18 53.2 kg (117 lb 6 oz)   09/17/18 54.5 kg (120 lb 3.2 oz)   08/31/18 55.6 kg (122 lb 8 oz)   08/31/18 55.5 kg (122 lb 6.4 oz)   08/21/18 51.7 kg (114 lb)   07/28/18 51.9 kg (114 lb 8 oz)   07/05/18 52.6 kg (116 lb)     Constitutional: Chronically ill-appearing, elderly female in no acute " distress.  Eyes: EOMI, PERRL. No scleral icterus.  ENT: Oral mucosa is moist without lesions or thrush.   Lymphatic: No inguinal adenopathy.   Cardiovascular: Irregular rate with regular rhythm, consistent with history of sinus arrhythmia. No murmurs, gallops, or rubs. Trace bilateral peripheral edema.  Respiratory: Clear to auscultation bilaterally. No wheezes or crackles.  Gastrointestinal: Bowel sounds present. Abdomen soft, non-tender. No palpable hepatosplenomegaly or masses. Anal exam deferred as it was performed in GI.  Neurologic: Cranial nerves II through XII are grossly intact. Fatigued during visit.  Skin: No rashes, petechiae, or bruising noted on exposed skin.    Laboratory Data:  Results for GERHARD SU (MRN [id number]) as of 11/26/2018 19:35   11/26/2018 12:54   Sodium 140   Potassium 4.2   Chloride 105   Carbon Dioxide 29   Urea Nitrogen 27   Creatinine 0.61   GFR Estimate >90   GFR Estimate If Black >90   Calcium 8.8   Anion Gap 6   Albumin 3.3 (L)   Protein Total 7.1   Bilirubin Total 0.5   Alkaline Phosphatase 124   ALT 18   AST 17   Glucose 75   WBC 7.5   Hemoglobin 12.1   Hematocrit 39.0   Platelet Count 247   RBC Count 4.42   MCV 88   MCH 27.4   MCHC 31.0 (L)   RDW 16.1 (H)   Diff Method Automated Method   % Neutrophils 72.8   % Lymphocytes 13.6   % Monocytes 10.3   % Eosinophils 2.4   % Basophils 0.5   % Immature Granulocytes 0.4   Nucleated RBCs 0   Absolute Neutrophil 5.4   Absolute Lymphocytes 1.0   Absolute Monocytes 0.8   Absolute Eosinophils 0.2   Absolute Basophils 0.0   Abs Immature Granulocytes 0.0   Absolute Nucleated RBC 0.0       Assessment and Plan:  1. Onc  T2N1- IIIA SCC Anal Canal. Started on definitive chemoradiation with infusional FU 1000 mg/m2 on days 1 to 4 and 29 to 32, plus mitomycin 10 mg/m2 on days 1 and 29 (as tolerated), maximum 20 mg per dose- day 1 on 02/12/18  Day 29 03/13/18 Skipped mitomycin and proceeded with 5FU at reduced dose given neutropenia/old  age.  05/25/18 MRI pelvis and PET scan done showed near complete resolution of patient's known anal lesion with no evidence for recurrence or metastatic disease. Currently on surveillance with distal rectal exam with inguinal lymph node palpation every 3-6 months for 5 years along with Anoscopy every 6-12 months for 3 years. Patient will also require a annual CT chest abdomen and pelvis with contrast for 3 years.     Returns today for routine follow-up. No palpable adenopathy and saw GI today who performed anal exam with no concerning findings. Discussed monitoring for recurrent rectal bleeding, abdominal pain, or changes to bowel patterns. Currently recovered well from her prior treatment and is working on rebuilding her strength from recent falls. Will plan to see her back in February and will also schedule a survivorship visit around this time.    2. MSK  Multiple falls recently with fractures in both hips. Following with ortho. Wants to just do vitamins for osteoporosis currently. Asked that she continue to discuss this with her PCP and ortho. Concerns for shoulder pain and carpal tunnel like symptoms-asked that she discuss this further with ortho and also monitor for improvement with PT/OT and being more mobile.    3. Heme  Did have normocytic anemia after surgery this fall. On iron supplementation. Hgb back to baseline now. Continue to monitor.    Greater than 25 min was spent with the patient with >50% of the time spent in counseling and coordinating care.     Tim Larose PA-C  [facility]  [address], [place] [id number] [phone]

## 2018-11-26 NOTE — NURSING NOTE
"Chief Complaint   Patient presents with     Port Draw     labs drawn from port by rn.  vs taken     Port accessed with 20 gauge 3/4\" gripper needle and labs drawn by rn.  Port flushed with NS and heparin then de-accessed.  Pt tolerated well.  VS taken.  Pt checked in for next appt.  Tuyet Salinas RN      "

## 2018-11-26 NOTE — LETTER
"2018       RE: Elizabeth Taylor  [address]  [facility] [address]     Dear Colleague,    Thank you for referring your patient, Elizabeth Taylor, to the [facility] COLON AND RECTAL SURGERY at [facility]. Please see a copy of my visit note below.    Colon and Rectal Surgery Follow-up Clinic Note      RE: Elizabeth Taylor  : 1940  [place]: 2018     DIAGNOSIS: mT2N1 anal canal squamous cell carcinoma (s/p CXRT [5,400 cGy], completed on 3/23/18).    INTERVAL HISTORY: Denies increased pain, fevers, or chills. Tolerating diet well. Having 2-3 BM's per day with fecal urgency and some seepage but denies incontinence or BPR.      Physical Examination:  /63 (BP Location: Left arm, Patient Position: Sitting, Cuff Size: Adult Regular)  Pulse 82  Ht 5' 4\"  LMP  (LMP Unknown)  BMI 19.91 kg/m2  Abdomen soft, NT. No inguinal adenopathy palpated.  Perianal skin with mild excoriation. Mild perianal hyperpigmentation and induration. No evidence of active infection.  BRITTNEY: no lesions palpated.  Anoscopy: Was performed.   Hemorrhoids: No significant internal hemorrhoids.  Lesions: No. White scar visualized at left anterior aspect of anal canal.    ASSESSMENT  Positive response to CXRT.     CEA: 2.0.    Interval imaging with PET-CT and MR pelvis on 18:    IMPRESSION:   In this patient with a history of anal cancer status post chemotherapy  and radiation:  1. Decrease in FDG uptake at the site of the primary anal mass. The  mass is poorly defined on the accompanying CT. This is consistent with  response to therapy.  2. No significant residual increased FDG uptake in the left inguinal  nodes.  3. Small focus of uptake adjacent to the urethra without an  accompanying lesion on the CT. This is most suspicious for a urethral  contamination, possibly within a urethral diverticulum or a small  amount of retained excreted urine tracking up into the " vaginal fornix.  Recommend attention on follow-up imaging, although alternatively a  repeat MRI of the pelvis may help increase confidence that this  finding does not represent small lesion.  4. Stable sub-5 mm indeterminate pulmonary nodules.  5. Improvement in trace amount of infiltrative free fluid in the  pelvis.  6. Decrease in now small right greater than left pleural effusions,  with resolution of the previously visualized right pneumothorax.  7. Incidental findings include chronic healing rib fractures,  asymmetric right paraspinal muscular uptake, cholelithiasis, and  dilated lower extremity superficial veins.  8. Redemonstration of the low-density cyst in the left adnexa likely  an ovarian cyst, without increased FDG uptake. Continued attention on  follow-up imaging is recommended.    IMPRESSION:   1. Interval near resolution of the patient's known left anal mass  lesion.  2. No evidence for recurrent or metastatic disease in the pelvis.  2a. Please also refer to same day PET/CT exam report.  3. Stable left ovarian cysts. Recommend monitoring at least on annual  basis (on routine anal cancer restaging follow-up or ultrasound pelvis  if at a later date restaging is no longer required).    PLAN  1. RTC in 6 months for anoscopy.  2. 3T MR pelvis in 3/2019.   3. PET/CT in 5/2019.  4. Colonoscopy in 2020.     Time spent: 30 minutes.  >50% spent in discussing, counseling and coordinating care.    Referring Provider:  Felisha Davis, CHAYITO  [address] [place] [address]     CC:  MD Zaire Mcleod MD Wolfgang B. Gaertner, M.D., M.Sc.     Division of Colon and Rectal Surgery  [facility]

## 2018-11-26 NOTE — MR AVS SNAPSHOT
After Visit Summary   11/26/2018    Elizabeth Taylor    MRN: [id number]           Patient Information     Date Of Birth          1940        Visit Information        Provider Department      11/26/2018 11:00 AM Emir Rosas MD [facility] Colon and Rectal Surgery        Today's Diagnoses     Malignant neoplasm of anal canal (H)    -  1       Follow-ups after your visit        Your next 10 appointments already scheduled     Nov 30, 2018 11:00 AM CST   (Arrive by 10:30 AM)   RETURN SPINE with Zaire Phan MD   [facility] ([facility])    [address]  4th Floor  [facility] [address]   [phone]            Feb 22, 2019  9:00 AM CST   Masonic Lab Draw with  MASONIC LAB DRAW   [facility] Masonic Lab Draw ([facility])    [address] [place] [address]  [address]  [facility] [address]   [phone]            Feb 22, 2019  9:40 AM CST   CT ABDOMEN PELVIS W CONTRAST with UCCT1   [facility] CT ([facility])    [address]  1st Floor  [facility] [address]   [phone]           How do I prepare for my exam? (Food and drink instructions) To prepare: Do not eat or drink for 2 hours before your exam. If you need to take medicine, you may take it with small sips of water. (We may ask you to take liquid medicine as well.)  How do I prepare for my exam? (Other instructions) Please arrive 30 minutes early for your CT.  Once in the department you might be asked to drink water 15-20 minutes prior to your exam.  If indicated you may be asked to drink an oral contrast in advance of your CT.  If this is the case, the imaging team will let you know or be in contact with you prior to your appointment  Patients over 70 or patients with diabetes or kidney problems: If you haven t had a blood test (creatinine test) within the last 30 days, the Cardiologist/Radiologist  may require you to get this test prior to your exam.  If you have diabetes:  Continue to take your metformin medication on the day of your exam  What should I wear: Please wear loose clothing, such as a sweat suit or jogging clothes. Avoid snaps, zippers and other metal. We may ask you to undress and put on a hospital gown.  How long does the exam take: Most scans take less than 20 minutes.  What should I bring: Please bring any scans or X-rays taken at other hospitals, if similar tests were done. Also bring a list of your medicines, including vitamins, minerals and over-the-counter drugs. It is safest to leave personal items at home.  Do I need a : No  is needed.  What do I need to tell my doctor? Be sure to tell your doctor: * If you have any allergies. * If there s any chance you are pregnant. * If you are breastfeeding.  What should I do after the exam: No restrictions, You may resume normal activities.  What is this test: A CT (computed tomography) scan is a series of pictures that allows us to look inside your body. The scanner creates images of the body in cross sections, much like slices of bread. This helps us see any problems more clearly. You may receive contrast (X-ray dye) before or during your scan. You will be asked to drink the contrast.  Who should I call with questions: If you have any questions, please call the Imaging Department where you will have your exam. Directions, parking instructions, and other information is available on our website, [email or web address].            Feb 22, 2019 12:30 PM CST   (Arrive by 12:15 PM)   Return Visit with Shen Ray MD   [facility] ([facility])    [address] [place] [address]  [address]  [facility] [id number] [phone]            Feb 22, 2019  2:00 PM CST   Return Visit with Zaire Madison MD   Radiation Oncology Clinic ([facility])    [facility]  1st  "Floor  [address] [address] [phone]            May 20, 2019 11:00 AM CDT   (Arrive by 10:45 AM)   Return Visit with Emir Rosas MD   [facility] Colon and Rectal Surgery ([facility])    [address]  4th [facility] [place] [address] [phone]              Future tests that were ordered for you today     Open Future Orders        Priority Expected Expires Ordered    XR Ribs & Chest RT G/E 3vw Routine 11/26/2018 12/26/2018 11/26/2018            Who to contact     Please call your clinic at [phone] to:    Ask questions about your health    Make or cancel appointments    Discuss your medicines    Learn about your test results    Speak to your doctor            Additional Information About Your Visit        [employer] Information     [employer] gives you secure access to your electronic health record. If you see a primary care provider, you can also send messages to your care team and make appointments. If you have questions, please call your primary care clinic.  If you do not have a primary care provider, please call [phone] and they will assist you.      [employer] is an electronic gateway that provides easy, online access to your medical records. With [employer], you can request a clinic appointment, read your test results, renew a prescription or communicate with your care team.     To access your existing account, please contact your [facility] or call [phone] for assistance.        Care EveryWhere ID     This is your Care EveryWhere ID. This could be used by other organizations to access your [place] medical records  [id number]        Your Vitals Were     Pulse Height Last Period BMI (Body Mass Index)          82 5' 4\" (LMP Unknown) 19.91 kg/m2         Blood Pressure from Last 3 Encounters:   11/26/18 115/73   11/26/18 102/63   11/05/18 117/60    Weight from Last 3 Encounters:   11/26/18 121 " lb 11.2 oz   11/16/18 116 lb   11/01/18 120 lb              We Performed the Following     ANOSCOPY W/WO BRUSH/WASH        Primary Care Provider Office Phone # Fax #    Baron Seth [phone] [address]  [facility] [address]        Equal Access to Services     IONA DALEY : Hadii aad ku hadasho Soomaali, waaxda luqadaha, qaybta kaalmada adeegyada, waxay toreyin haynatalian adeargentina padminishirley laaleen . So [facility] [phone].    ATENCIÓN: Si habla español, tiene a ghosh disposición servicios gratuitos de asistencia lingüística. Nancyame al [phone].    We comply with applicable federal civil rights laws and [place] laws. We do not discriminate on the basis of race, color, national origin, age, disability, sex, sexual orientation, or gender identity.            Thank you!     Thank you for choosing [facility] COLON AND RECTAL SURGERY  for your care. Our goal is always to provide you with excellent care. Hearing back from our patients is one way we can continue to improve our services. Please take a few minutes to complete the written survey that you may receive in the mail after your visit with us. Thank you!             Your Updated Medication List - Protect others around you: Learn how to safely use, store and throw away your medicines at www.disposemymeds.org.          This list is accurate as of 11/26/18  1:52 PM.  Always use your most recent med list.                   Brand Name Dispense Instructions for use Diagnosis    acetaminophen 500 MG tablet    TYLENOL     Take 1,000 mg by mouth 3 times daily        bisacodyl 10 MG Suppository    DULCOLAX    30 suppository    Place 1 suppository (10 mg) rectally daily as needed for constipation    Drug-induced constipation       calcium carbonate 600 mg-vitamin D 400 units 600-400 MG-UNIT per tablet    CALTRATE    60 tablet    Take 1 tablet by mouth daily    Age-related osteoporosis with current pathological fracture, initial encounter       cholecalciferol  5000 units Caps      Take 1 capsule (5,000 Units) by mouth daily    Age-related osteoporosis with current pathological fracture, initial encounter, Pathological fracture of pelvis, unspecified pathological cause, initial encounter       docusate sodium 100 MG capsule    COLACE    60 capsule    Take 1 capsule (100 mg) by mouth 2 times daily    Drug-induced constipation       ferrous sulfate 325 (65 Fe) MG tablet    IRON     Take 325 mg by mouth daily (with breakfast)        Lidocaine 4 % Patch    LIDOCARE     Place 2 patches onto the skin every 24 hours    Pathological fracture of pelvis, unspecified pathological cause, initial encounter       methocarbamol 500 MG tablet    ROBAXIN    120 tablet    Take 1 tablet (500 mg) by mouth every 6 hours as needed for muscle spasms    Pathological fracture of pelvis, unspecified pathological cause, initial encounter       oxyCODONE 5 MG tablet    ROXICODONE    10 tablet    Take 0.5 tablets (2.5 mg) by mouth every 4 hours as needed for moderate to severe pain    Pathological fracture of pelvis, unspecified pathological cause, initial encounter       polyethylene glycol Packet    MIRALAX/GLYCOLAX    7 packet    Take 17 g by mouth daily as needed for constipation    Drug-induced constipation       simethicone 40 MG/0.6ML suspension    MYLICON     Take 0.6 mLs (40 mg) by mouth every 6 hours as needed for cramping    Malignant neoplasm of anal canal (H), Flatulence, eructation and gas pain       traMADol 50 MG tablet    ULTRAM    30 tablet    Take 1 tablet (50 mg) by mouth every 6 hours as needed for severe pain    Malignant neoplasm of anal canal (H)       vitamin B complex with vitamin C Tabs tablet      Take 1 tablet by mouth daily        VITAMIN E PO      Take 1 capsule by mouth daily

## 2018-11-26 NOTE — MR AVS SNAPSHOT
After Visit Summary   11/26/2018    Elizabeth Taylor    MRN: [id number]           Patient Information     Date Of Birth          1940        Visit Information        Provider Department      11/26/2018 1:20 PM Tim Larose PA [facility]        Today's Diagnoses     Malignant neoplasm of anal canal (H)    -  1       Follow-ups after your visit        Your next 10 appointments already scheduled     Nov 30, 2018 10:40 AM CST   XR PELVIS G/E 3 VIEWS with [email or web address]   [facility] Orthopaedics XRay ([facility])    [address] [place] [id number] [phone]           How do I prepare for my exam? (Food and drink instructions) No Food and Drink Restrictions.  How do I prepare for my exam? (Other instructions) You do not need to do anything special for this exam.  What should I wear: Wear comfortable clothes.  How long does the exam take: Most scans take less than 5 minutes.  What should I bring: Bring a list of your medicines, including vitamins, minerals and over-the-counter drugs. It is safest to leave personal items at home.  Do I need a :  No  is needed.  What do I need to tell my doctor: Tell your doctor if there s any chance you are pregnant.  What should I do after the exam: No restrictions, You may resume normal activities.  What is this test: An image of a specific body part shown in shades of black and white.  Who should I call with questions: If you have any questions, please call the Imaging Department where you will have your exam. Directions, parking instructions, and other information is available on our website, [email or web address].            Nov 30, 2018 11:00 AM CST   (Arrive by 10:30 AM)   RETURN SPINE with Zaire Phan MD   [facility]opa[facility] ([facility])    [address] [place] [address] [phone]            Feb  22, 2019  9:00 AM CST   Masonic Lab Draw with  MASONIC LAB DRAW   [facility] Masonic Lab Draw ([facility])    [address]  [address]  [facility] [address] [phone]            Feb 22, 2019  9:40 AM CST   CT ABDOMEN PELVIS W CONTRAST with UCCT1   [facility] CT ([facility])    [address]  1st Floor  [facility] [address] [phone]           How do I prepare for my exam? (Food and drink instructions) To prepare: Do not eat or drink for 2 hours before your exam. If you need to take medicine, you may take it with small sips of water. (We may ask you to take liquid medicine as well.)  How do I prepare for my exam? (Other instructions) Please arrive 30 minutes early for your CT.  Once in the department you might be asked to drink water 15-20 minutes prior to your exam.  If indicated you may be asked to drink an oral contrast in advance of your CT.  If this is the case, the imaging team will let you know or be in contact with you prior to your appointment  Patients over 70 or patients with diabetes or kidney problems: If you haven t had a blood test (creatinine test) within the last 30 days, the Cardiologist/Radiologist may require you to get this test prior to your exam.  If you have diabetes:  Continue to take your metformin medication on the day of your exam  What should I wear: Please wear loose clothing, such as a sweat suit or jogging clothes. Avoid snaps, zippers and other metal. We may ask you to undress and put on a hospital gown.  How long does the exam take: Most scans take less than 20 minutes.  What should I bring: Please bring any scans or X-rays taken at other hospitals, if similar tests were done. Also bring a list of your medicines, including vitamins, minerals and over-the-counter drugs. It is safest to leave personal items at home.  Do I need a : No  is needed.  What do I need to tell my  doctor? Be sure to tell your doctor: * If you have any allergies. * If there s any chance you are pregnant. * If you are breastfeeding.  What should I do after the exam: No restrictions, You may resume normal activities.  What is this test: A CT (computed tomography) scan is a series of pictures that allows us to look inside your body. The scanner creates images of the body in cross sections, much like slices of bread. This helps us see any problems more clearly. You may receive contrast (X-ray dye) before or during your scan. You will be asked to drink the contrast.  Who should I call with questions: If you have any questions, please call the Imaging Department where you will have your exam. Directions, parking instructions, and other information is available on our website, [email or web address].            Feb 22, 2019 12:30 PM CST   (Arrive by 12:15 PM)   Return Visit with Shen Ray MD   [facility] ([facility])    [address]  [address]  [facility] [address] [phone]            Feb 22, 2019  2:00 PM CST   Return Visit with Zaire Madison MD   Radiation Oncology Clinic ([facility])    [facility]  1st Floor  [address] [address] [phone]            May 20, 2019 11:00 AM CDT   (Arrive by 10:45 AM)   Return Visit with Emir Rosas MD   [facility] Colon and Rectal Surgery ([facility] [place])    [address]  4th Floor  [facility] [address] [phone]              Future tests that were ordered for you today     Open Future Orders        Priority Expected Expires Ordered    CBC with platelets differential Routine 2/22/2019 11/26/2019 11/26/2018    Comprehensive metabolic panel Routine 2/22/2019 11/26/2019 11/26/2018    XR Pelvis G/E 3 vw Routine 11/26/2018 12/26/2018 11/26/2018    XR Ribs & Chest RT G/E 3vw Routine 11/26/2018  "12/26/2018 11/26/2018            Who to contact     If you have questions or need follow up information about today's clinic visit or your schedule please contact [facility] directly at [phone].  Normal or non-critical lab and imaging results will be communicated to you by MyChart, letter or phone within 4 business days after the clinic has received the results. If you do not hear from us within 7 days, please contact the clinic through [employer] or phone. If you have a critical or abnormal lab result, we will notify you by phone as soon as possible.  Submit refill requests through [employer] or call your pharmacy and they will forward the refill request to us. Please allow 3 business days for your refill to be completed.          Additional Information About Your Visit        [employer] Information     [employer] gives you secure access to your electronic health record. If you see a primary care provider, you can also send messages to your care team and make appointments. If you have questions, please call your primary care clinic.  If you do not have a primary care provider, please call [phone] and they will assist you.        Care EveryWhere ID     This is your Care EveryWhere ID. This could be used by other organizations to access your [place] medical records  [id number]        Your Vitals Were     Pulse Temperature Respirations Height Last Period Pulse Oximetry    69 98.5  F (36.9  C) (Oral) 16 1.626 m (5' 4.02\") (LMP Unknown) 96%    BMI (Body Mass Index)                   20.88 kg/m2            Blood Pressure from Last 3 Encounters:   11/26/18 115/73   11/26/18 102/63   11/05/18 117/60    Weight from Last 3 Encounters:   11/26/18 55.2 kg (121 lb 11.2 oz)   11/16/18 52.6 kg (116 lb)   11/01/18 54.4 kg (120 lb)              We Performed the Following     CBC with platelets differential     Comprehensive metabolic panel        Primary Care Provider Office Phone # Fax #    Baron Seth MD " [phone]       [address]  [facility] [address]        Equal Access to Services     IONA DALEY : Hadii aad ku hadrayabebe Monaali, alainaleland christiansontonyha, melida gomes, cyndy toreyin hayaan seymourargentina real laFeelly catalanGutierrez [facility] [phone].    ATENCIÓN: Si habla español, tiene a ghosh disposición servicios gratuitos de asistencia lingüística. Llame al [phone].    We comply with applicable federal civil rights laws and [place] laws. We do not discriminate on the basis of race, color, national origin, age, disability, sex, sexual orientation, or gender identity.            Thank you!     Thank you for choosing [facility]  for your care. Our goal is always to provide you with excellent care. Hearing back from our patients is one way we can continue to improve our services. Please take a few minutes to complete the written survey that you may receive in the mail after your visit with us. Thank you!             Your Updated Medication List - Protect others around you: Learn how to safely use, store and throw away your medicines at www.disposemymeds.org.          This list is accurate as of 11/26/18 11:59 PM.  Always use your most recent med list.                   Brand Name Dispense Instructions for use Diagnosis    acetaminophen 500 MG tablet    TYLENOL     Take 1,000 mg by mouth 3 times daily        bisacodyl 10 MG Suppository    DULCOLAX    30 suppository    Place 1 suppository (10 mg) rectally daily as needed for constipation    Drug-induced constipation       calcium carbonate 600 mg-vitamin D 400 units 600-400 MG-UNIT per tablet    CALTRATE    60 tablet    Take 1 tablet by mouth daily    Age-related osteoporosis with current pathological fracture, initial encounter       cholecalciferol 5000 units Caps      Take 1 capsule (5,000 Units) by mouth daily    Age-related osteoporosis with current pathological fracture, initial encounter, Pathological fracture of pelvis, unspecified  pathological cause, initial encounter       docusate sodium 100 MG capsule    COLACE    60 capsule    Take 1 capsule (100 mg) by mouth 2 times daily    Drug-induced constipation       ferrous sulfate 325 (65 Fe) MG tablet    IRON     Take 325 mg by mouth daily (with breakfast)        Lidocaine 4 % Patch    LIDOCARE     Place 2 patches onto the skin every 24 hours    Pathological fracture of pelvis, unspecified pathological cause, initial encounter       methocarbamol 500 MG tablet    ROBAXIN    120 tablet    Take 1 tablet (500 mg) by mouth every 6 hours as needed for muscle spasms    Pathological fracture of pelvis, unspecified pathological cause, initial encounter       oxyCODONE 5 MG tablet    ROXICODONE    10 tablet    Take 0.5 tablets (2.5 mg) by mouth every 4 hours as needed for moderate to severe pain    Pathological fracture of pelvis, unspecified pathological cause, initial encounter       polyethylene glycol Packet    MIRALAX/GLYCOLAX    7 packet    Take 17 g by mouth daily as needed for constipation    Drug-induced constipation       simethicone 40 MG/0.6ML suspension    MYLICON     Take 0.6 mLs (40 mg) by mouth every 6 hours as needed for cramping    Malignant neoplasm of anal canal (H), Flatulence, eructation and gas pain       traMADol 50 MG tablet    ULTRAM    30 tablet    Take 1 tablet (50 mg) by mouth every 6 hours as needed for severe pain    Malignant neoplasm of anal canal (H)       vitamin B complex with vitamin C tablet      Take 1 tablet by mouth daily        VITAMIN E PO      Take 1 capsule by mouth daily

## 2018-11-26 NOTE — Clinical Note
11/26/2018       RE: Elizabeth Taylor  [address]  [facility] [address]     Dear Colleague,    Thank you for referring your patient, Elizabeth Taylor, to the [facility]. Please see a copy of my visit note below.    Oncology/Hematology Visit Note  Nov 26, 2018    Reason for Visit: Follow up of anal canal SCC    History of Present Illness: Elizabeth Taylor is a 78 year old female with a history of stage E1M7-[id number] SCC anal canal. Her oncologic history is as follows. She developed bright red blood per rectum started about 4 years ago and progressively got worse. She underwent a colonoscopy in April 2017 which noted to have small anal fissure along with internal hemorrhoids. Symptoms continued and  she was referred to colorectal surgery for further evaluation. On exam she was found to have an 1.5 cm anal lesion on the left side along with left groin palpable adenopathy. Biopsy of anal mass came back positive for squamous cell carcinoma. Patient underwent staging workup with MRI pelvis and a PET scan- showed PET avid left anal canal mass along with small left inguinal FDG avid lymph nodes.      02/12/18- Started on concurrent chemoradiation with 5FU/Mitomycin     03/13/18 Skipped Mitomycin and proceeded with 5FU at reduced dose given neutropenia/old age     03/19/18 -04/04/18 admitted to the [facility] with intractable diarrhea, nausea, generalized weakness and fall at home resulting in multiple right rib fractures. Hospital course was complicated by small bowel obstruction for which patient was started on TPN. Bowel obstruction was managed conservatively eventually improved prior to discharge . 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 She was eventually discharged to transitional care unit.     05/25/18 05/25/18 MRI pelvis and PET scan done showed near complete resolution of patient's known anal lesion with no  evidence for recurrence or metastatic disease.     07/25/18-07/30/18 admitted to the hospital with traumatic right femoral neck fracture secondary to fall. She underwent right hemiarthroplasty and was discharged to transitional care facility. Also noted to have a C. difficile infection and was started on oral vancomycin course.    11/1/18-11/5/18 admitted to hospital with a fall and left hip pain found to have pelvic ring fracture. Treated conservatively with pain control, PT/OT, and recommendations for PCP follow-up with DEXA scan and osteoporosis work-up.     She returns today for ongoing surveillance after treatment.    Interval History:  Ms. Taylor returns to clinic today with her friend. Overall she is feeling well. She has had two falls with fractures and is now at a TCU which she states is going well. Pain is improving and she is starting to be more active. She states she saw someone for her osteoporosis who recommended bisphosphonate but for now she is just doing Vit D.     No fevers. No chest pain or SOB though she does have heart burn. She states she has vomited twice this last week due to pain from muscles spasms in her leg and frustration with staff regarding muscle relaxants, but otherwise denies N/V. She does have mild abdominal discomfort with bowel movements but otherwise no abdominal pain. No rectal pain. Bowel movements formed and regular after each meal, though she does still have urgency with stooling after eating. Unclear about bleeding (vaginal vs rectal) though overall she states is she has had bleeding it has been minimal. No urinary concerns. She is eating really well.     She does wonder about pain in her left shoulder and neuropathy in her left pointer and middle finger the last week or so. She is also still fatigued. She just started her vaginal dilator per rad onc recommendations.    Current Outpatient Prescriptions   Medication Sig Dispense Refill     acetaminophen (TYLENOL) 500 MG  "tablet Take 1,000 mg by mouth 3 times daily       bisacodyl (DULCOLAX) 10 MG Suppository Place 1 suppository (10 mg) rectally daily as needed for constipation 30 suppository      calcium-vitamin D (CALTRATE) 600-400 MG-UNIT per tablet Take 1 tablet by mouth daily 60 tablet      cholecalciferol 5000 units CAPS Take 1 capsule (5,000 Units) by mouth daily       docusate sodium (COLACE) 100 MG capsule Take 1 capsule (100 mg) by mouth 2 times daily 60 capsule      ferrous sulfate (IRON) 325 (65 Fe) MG tablet Take 325 mg by mouth daily (with breakfast)       Lidocaine (LIDOCARE) 4 % Patch Place 2 patches onto the skin every 24 hours       methocarbamol (ROBAXIN) 500 MG tablet Take 1 tablet (500 mg) by mouth every 6 hours as needed for muscle spasms 120 tablet      oxyCODONE IR (ROXICODONE) 5 MG tablet Take 0.5 tablets (2.5 mg) by mouth every 4 hours as needed for moderate to severe pain 10 tablet 0     polyethylene glycol (MIRALAX/GLYCOLAX) Packet Take 17 g by mouth daily as needed for constipation 7 packet      simethicone (MYLICON) 40 MG/0.6ML suspension Take 0.6 mLs (40 mg) by mouth every 6 hours as needed for cramping       traMADol (ULTRAM) 50 MG tablet Take 1 tablet (50 mg) by mouth every 6 hours as needed for severe pain 30 tablet 0     vitamin B complex with vitamin C (VITAMIN  B COMPLEX) TABS tablet Take 1 tablet by mouth daily       VITAMIN E PO Take 1 capsule by mouth daily         Physical Examination:  /73 (BP Location: Right arm, Patient Position: Sitting, Cuff Size: Adult Regular)  Pulse 69  Temp 98.5  F (36.9  C) (Oral)  Resp 16  Ht 1.626 m (5' 4.02\")  Wt 55.2 kg (121 lb 11.2 oz)  LMP  (LMP Unknown)  SpO2 96%  BMI 20.88 kg/m2  Wt Readings from Last 10 Encounters:   11/16/18 52.6 kg (116 lb)   11/01/18 54.4 kg (120 lb)   10/22/18 53 kg (116 lb 12.8 oz)   09/21/18 53.2 kg (117 lb 6 oz)   09/17/18 54.5 kg (120 lb 3.2 oz)   08/31/18 55.6 kg (122 lb 8 oz)   08/31/18 55.5 kg (122 lb 6.4 oz) "   08/21/18 51.7 kg (114 lb)   07/28/18 51.9 kg (114 lb 8 oz)   07/05/18 52.6 kg (116 lb)     Constitutional: Chronically ill-appearing, elderly female in no acute distress.  Eyes: EOMI, PERRL. No scleral icterus.  ENT: Oral mucosa is moist without lesions or thrush.   Lymphatic: No inguinal adenopathy.   Cardiovascular: Irregular rate with regular rhythm, consistent with history of sinus arrhythmia. No murmurs, gallops, or rubs. Trace bilateral peripheral edema.  Respiratory: Clear to auscultation bilaterally. No wheezes or crackles.  Gastrointestinal: Bowel sounds present. Abdomen soft, non-tender. No palpable hepatosplenomegaly or masses. Anal exam deferred as it was performed in GI.  Neurologic: Cranial nerves II through XII are grossly intact. Fatigued during visit.  Skin: No rashes, petechiae, or bruising noted on exposed skin.    Laboratory Data:  Results for GERHARD SU (MRN [id number]) as of 11/26/2018 19:35   11/26/2018 12:54   Sodium 140   Potassium 4.2   Chloride 105   Carbon Dioxide 29   Urea Nitrogen 27   Creatinine 0.61   GFR Estimate >90   GFR Estimate If Black >90   Calcium 8.8   Anion Gap 6   Albumin 3.3 (L)   Protein Total 7.1   Bilirubin Total 0.5   Alkaline Phosphatase 124   ALT 18   AST 17   Glucose 75   WBC 7.5   Hemoglobin 12.1   Hematocrit 39.0   Platelet Count 247   RBC Count 4.42   MCV 88   MCH 27.4   MCHC 31.0 (L)   RDW 16.1 (H)   Diff Method Automated Method   % Neutrophils 72.8   % Lymphocytes 13.6   % Monocytes 10.3   % Eosinophils 2.4   % Basophils 0.5   % Immature Granulocytes 0.4   Nucleated RBCs 0   Absolute Neutrophil 5.4   Absolute Lymphocytes 1.0   Absolute Monocytes 0.8   Absolute Eosinophils 0.2   Absolute Basophils 0.0   Abs Immature Granulocytes 0.0   Absolute Nucleated RBC 0.0       Assessment and Plan:  IN PROGRESS        Tim Larose PA-C  [facility]  [address], [place] [id number] [phone]      Again, thank you for allowing me  to participate in the care of your patient.      Sincerely,    HAIDER Avina

## 2018-11-26 NOTE — NURSING NOTE
"Chief Complaint   Patient presents with     Cancer     Anal.     RECHECK     3 month follow-up.       Vitals:    11/26/18 1128   BP: 102/63   BP Location: Left arm   Patient Position: Sitting   Cuff Size: Adult Regular   Pulse: 82   Height: 5' 4\"       Body mass index is 19.91 kg/(m^2).      Matti Daugherty, EMT                      "

## 2018-11-26 NOTE — PROGRESS NOTES
Oncology/Hematology Visit Note  Nov 26, 2018    Reason for Visit: Follow up of anal canal SCC    History of Present Illness: Elizabeth Taylor is a 78 year old female with a history of stage E2R8-XFTJ SCC anal canal. Her oncologic history is as follows. She developed bright red blood per rectum started about 4 years ago and progressively got worse. She underwent a colonoscopy in April 2017 which noted to have small anal fissure along with internal hemorrhoids. Symptoms continued and  she was referred to colorectal surgery for further evaluation. On exam she was found to have an 1.5 cm anal lesion on the left side along with left groin palpable adenopathy. Biopsy of anal mass came back positive for squamous cell carcinoma. Patient underwent staging workup with MRI pelvis and a PET scan- showed PET avid left anal canal mass along with small left inguinal FDG avid lymph nodes.      02/12/18- Started on concurrent chemoradiation with 5FU/Mitomycin     03/13/18 Skipped Mitomycin and proceeded with 5FU at reduced dose given neutropenia/old age     03/19/18 -04/04/18 admitted to the [facility] with intractable diarrhea, nausea, generalized weakness and fall at home resulting in multiple right rib fractures. Hospital course was complicated by small bowel obstruction for which patient was started on TPN. Bowel obstruction was managed conservatively eventually improved prior to discharge . 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 She was eventually discharged to transitional care unit.     05/25/18 05/25/18 MRI pelvis and PET scan done showed near complete resolution of patient's known anal lesion with no evidence for recurrence or metastatic disease.     07/25/18-07/30/18 admitted to the hospital with traumatic right femoral neck fracture secondary to fall. She underwent right hemiarthroplasty and was discharged to transitional care facility. Also noted to have  a C. difficile infection and was started on oral vancomycin course.    11/1/18-11/5/18 admitted to hospital with a fall and left hip pain found to have pelvic ring fracture. Treated conservatively with pain control, PT/OT, and recommendations for PCP follow-up with DEXA scan and osteoporosis work-up.     She returns today for ongoing surveillance after treatment.    Interval History:  Ms. Taylor returns to clinic today with her friend. Overall she is feeling well. She has had two falls with fractures and is now at a TCU which she states is going well. Pain is improving and she is starting to be more active. She states she saw someone for her osteoporosis who recommended bisphosphonate but for now she is just doing Vit D.     No fevers. No chest pain or SOB though she does have heart burn. She states she has vomited twice this last week due to pain from muscles spasms in her leg and frustration with staff regarding muscle relaxants, but otherwise denies N/V. She does have mild abdominal discomfort with bowel movements but otherwise no abdominal pain. No rectal pain. Bowel movements formed and regular after each meal, though she does still have urgency with stooling after eating. Unclear about bleeding (vaginal vs rectal) though overall she states is she has had bleeding it has been minimal. No urinary concerns. She is eating really well.     She does wonder about pain in her left shoulder and neuropathy in her left pointer and middle finger the last week or so. She is also still fatigued. She just started her vaginal dilator per rad onc recommendations.    Current Outpatient Prescriptions   Medication Sig Dispense Refill     acetaminophen (TYLENOL) 500 MG tablet Take 1,000 mg by mouth 3 times daily       bisacodyl (DULCOLAX) 10 MG Suppository Place 1 suppository (10 mg) rectally daily as needed for constipation 30 suppository      calcium-vitamin D (CALTRATE) 600-400 MG-UNIT per tablet Take 1 tablet by mouth daily 60  "tablet      cholecalciferol 5000 units CAPS Take 1 capsule (5,000 Units) by mouth daily       docusate sodium (COLACE) 100 MG capsule Take 1 capsule (100 mg) by mouth 2 times daily 60 capsule      ferrous sulfate (IRON) 325 (65 Fe) MG tablet Take 325 mg by mouth daily (with breakfast)       Lidocaine (LIDOCARE) 4 % Patch Place 2 patches onto the skin every 24 hours       methocarbamol (ROBAXIN) 500 MG tablet Take 1 tablet (500 mg) by mouth every 6 hours as needed for muscle spasms 120 tablet      oxyCODONE IR (ROXICODONE) 5 MG tablet Take 0.5 tablets (2.5 mg) by mouth every 4 hours as needed for moderate to severe pain 10 tablet 0     polyethylene glycol (MIRALAX/GLYCOLAX) Packet Take 17 g by mouth daily as needed for constipation 7 packet      simethicone (MYLICON) 40 MG/0.6ML suspension Take 0.6 mLs (40 mg) by mouth every 6 hours as needed for cramping       traMADol (ULTRAM) 50 MG tablet Take 1 tablet (50 mg) by mouth every 6 hours as needed for severe pain 30 tablet 0     vitamin B complex with vitamin C (VITAMIN  B COMPLEX) TABS tablet Take 1 tablet by mouth daily       VITAMIN E PO Take 1 capsule by mouth daily         Physical Examination:  /73 (BP Location: Right arm, Patient Position: Sitting, Cuff Size: Adult Regular)  Pulse 69  Temp 98.5  F (36.9  C) (Oral)  Resp 16  Ht 1.626 m (5' 4.02\")  Wt 55.2 kg (121 lb 11.2 oz)  LMP  (LMP Unknown)  SpO2 96%  BMI 20.88 kg/m2  Wt Readings from Last 10 Encounters:   11/16/18 52.6 kg (116 lb)   11/01/18 54.4 kg (120 lb)   10/22/18 53 kg (116 lb 12.8 oz)   09/21/18 53.2 kg (117 lb 6 oz)   09/17/18 54.5 kg (120 lb 3.2 oz)   08/31/18 55.6 kg (122 lb 8 oz)   08/31/18 55.5 kg (122 lb 6.4 oz)   08/21/18 51.7 kg (114 lb)   07/28/18 51.9 kg (114 lb 8 oz)   07/05/18 52.6 kg (116 lb)     Constitutional: Chronically ill-appearing, elderly female in no acute distress.  Eyes: EOMI, PERRL. No scleral icterus.  ENT: Oral mucosa is moist without lesions or thrush. "   Lymphatic: No inguinal adenopathy.   Cardiovascular: Irregular rate with regular rhythm, consistent with history of sinus arrhythmia. No murmurs, gallops, or rubs. Trace bilateral peripheral edema.  Respiratory: Clear to auscultation bilaterally. No wheezes or crackles.  Gastrointestinal: Bowel sounds present. Abdomen soft, non-tender. No palpable hepatosplenomegaly or masses. Anal exam deferred as it was performed in GI.  Neurologic: Cranial nerves II through XII are grossly intact. Fatigued during visit.  Skin: No rashes, petechiae, or bruising noted on exposed skin.    Laboratory Data:  Results for GERHARD SU (MRN [id number]) as of 11/26/2018 19:35   11/26/2018 12:54   Sodium 140   Potassium 4.2   Chloride 105   Carbon Dioxide 29   Urea Nitrogen 27   Creatinine 0.61   GFR Estimate >90   GFR Estimate If Black >90   Calcium 8.8   Anion Gap 6   Albumin 3.3 (L)   Protein Total 7.1   Bilirubin Total 0.5   Alkaline Phosphatase 124   ALT 18   AST 17   Glucose 75   WBC 7.5   Hemoglobin 12.1   Hematocrit 39.0   Platelet Count 247   RBC Count 4.42   MCV 88   MCH 27.4   MCHC 31.0 (L)   RDW 16.1 (H)   Diff Method Automated Method   % Neutrophils 72.8   % Lymphocytes 13.6   % Monocytes 10.3   % Eosinophils 2.4   % Basophils 0.5   % Immature Granulocytes 0.4   Nucleated RBCs 0   Absolute Neutrophil 5.4   Absolute Lymphocytes 1.0   Absolute Monocytes 0.8   Absolute Eosinophils 0.2   Absolute Basophils 0.0   Abs Immature Granulocytes 0.0   Absolute Nucleated RBC 0.0       Assessment and Plan:  1. Onc  T2N1- IIIA SCC Anal Canal. Started on definitive chemoradiation with infusional FU 1000 mg/m2 on days 1 to 4 and 29 to 32, plus mitomycin 10 mg/m2 on days 1 and 29 (as tolerated), maximum 20 mg per dose- day 1 on 02/12/18  Day 29 03/13/18 Skipped mitomycin and proceeded with 5FU at reduced dose given neutropenia/old age.  05/25/18 MRI pelvis and PET scan done showed near complete resolution of patient's known anal  lesion with no evidence for recurrence or metastatic disease. Currently on surveillance with distal rectal exam with inguinal lymph node palpation every 3-6 months for 5 years along with Anoscopy every 6-12 months for 3 years. Patient will also require a annual CT chest abdomen and pelvis with contrast for 3 years.     Returns today for routine follow-up. No palpable adenopathy and saw GI today who performed anal exam with no concerning findings. Discussed monitoring for recurrent rectal bleeding, abdominal pain, or changes to bowel patterns. Currently recovered well from her prior treatment and is working on rebuilding her strength from recent falls. Will plan to see her back in February and will also schedule a survivorship visit around this time.    2. MSK  Multiple falls recently with fractures in both hips. Following with ortho. Wants to just do vitamins for osteoporosis currently. Asked that she continue to discuss this with her PCP and ortho. Concerns for shoulder pain and carpal tunnel like symptoms-asked that she discuss this further with ortho and also monitor for improvement with PT/OT and being more mobile.    3. Heme  Did have normocytic anemia after surgery this fall. On iron supplementation. Hgb back to baseline now. Continue to monitor.    Greater than 25 min was spent with the patient with >50% of the time spent in counseling and coordinating care.     Tim Larose PA-C  [facility]  [address] [place] [address] [phone]

## 2018-11-30 ENCOUNTER — OFFICE VISIT (OUTPATIENT)
Dept: ORTHOPEDICS | Facility: CLINIC | Age: 78
End: 2018-11-30
Payer: MEDICARE

## 2018-11-30 ENCOUNTER — RADIANT APPOINTMENT (OUTPATIENT)
Dept: GENERAL RADIOLOGY | Facility: CLINIC | Age: 78
End: 2018-11-30
Attending: ORTHOPAEDIC SURGERY
Payer: MEDICARE

## 2018-11-30 DIAGNOSIS — M94.9 DISORDER OF BONE AND CARTILAGE: ICD-10-CM

## 2018-11-30 DIAGNOSIS — M25.512 CHRONIC LEFT SHOULDER PAIN: ICD-10-CM

## 2018-11-30 DIAGNOSIS — Z96.649 HISTORY OF TOTAL HIP REPLACEMENT, UNSPECIFIED LATERALITY: Primary | ICD-10-CM

## 2018-11-30 DIAGNOSIS — M80.00XA AGE-RELATED OSTEOPOROSIS WITH CURRENT PATHOLOGICAL FRACTURE, INITIAL ENCOUNTER: ICD-10-CM

## 2018-11-30 DIAGNOSIS — M89.9 DISORDER OF BONE AND CARTILAGE: ICD-10-CM

## 2018-11-30 DIAGNOSIS — M25.551 HIP PAIN, RIGHT: ICD-10-CM

## 2018-11-30 DIAGNOSIS — G89.29 CHRONIC LEFT SHOULDER PAIN: ICD-10-CM

## 2018-11-30 ASSESSMENT — ENCOUNTER SYMPTOMS
DYSURIA: 0
LOSS OF CONSCIOUSNESS: 0
BACK PAIN: 1
ARTHRALGIAS: 1
DIFFICULTY URINATING: 0
DEPRESSION: 1
NUMBNESS: 0
MYALGIAS: 1
STIFFNESS: 1
HEMATURIA: 0
DECREASED CONCENTRATION: 1
MUSCLE CRAMPS: 1
INSOMNIA: 1
DISTURBANCES IN COORDINATION: 1
TREMORS: 1
WEAKNESS: 1
NERVOUS/ANXIOUS: 1
FLANK PAIN: 0
PANIC: 0
SEIZURES: 0
HEADACHES: 1
MUSCLE WEAKNESS: 1
TINGLING: 1
PARALYSIS: 0
JOINT SWELLING: 0
MEMORY LOSS: 1
DIZZINESS: 0
SPEECH CHANGE: 1
NECK PAIN: 1

## 2018-11-30 NOTE — NURSING NOTE
Reason For Visit:   Chief Complaint   Patient presents with     Spine - RECHECK       Primary MD: Baron Seth  Ref. MD: Self Referrred    Date of injury: 11/16/18  Type of injury: Fell, hip pain.    LMP  (LMP Unknown)    Pain Assessment  Patient Currently in Pain: Yes  0-10 Pain Scale: 2  Primary Pain Location: Buttocks  Pain Orientation: Right  Pain Descriptors: Aching, Discomfort, Spasm  Alleviating Factors: NSAIDS  Aggravating Factors:  (spasms, 2-4 am is when she expierces pain)    Oswestry (NAVJOT) Questionnaire    OSWESTRY DISABILITY INDEX 11/30/2018   Count 10   Sum 27   Oswestry Score (%) 54   Some recent data might be hidden            Neck Disability Index (NDI) Questionnaire    No flowsheet data found.           Visual Analog Pain Scale  Back Pain Scale 0-10: 2  Right leg pain: 2  Left leg pain: 2  Neck Pain Scale 0-10: 1  Right arm pain: 2  Left arm pain: 2    Promis 10 Assessment    PROMIS 10 11/30/2018   In general, would you say your health is: Good   In general, would you say your quality of life is: Good   In general, how would you rate your physical health? Fair   In general, how would you rate your mental health, including your mood and your ability to think? Fair   In general, how would you rate your satisfaction with your social activities and relationships? Fair   In general, please rate how well you carry out your usual social activities and roles Poor   To what extent are you able to carry out your everyday physical activities such as walking, climbing stairs, carrying groceries, or moving a chair? A little   How often have you been bothered by emotional problems such as feeling anxious, depressed or irritable? Sometimes   How would you rate your fatigue on average? Moderate   How would you rate your pain on average?   0 = No Pain  to  10 = Worst Imaginable Pain 5   In general, would you say your health is: 3   In general, would you say your quality of life is: 3   In general, how would you  rate your physical health? 2   In general, how would you rate your mental health, including your mood and your ability to think? 2   In general, how would you rate your satisfaction with your social activities and relationships? 2   In general, please rate how well you carry out your usual social activities and roles. (This includes activities at home, at work and in your community, and responsibilities as a parent, child, spouse, employee, friend, etc.) 1   To what extent are you able to carry out your everyday physical activities such as walking, climbing stairs, carrying groceries, or moving a chair? 2   In the past 7 days, how often have you been bothered by emotional problems such as feeling anxious, depressed, or irritable? 3   In the past 7 days, how would you rate your fatigue on average? 3   In the past 7 days, how would you rate your pain on average, where 0 means no pain, and 10 means worst imaginable pain? 5   Global Mental Health Score 10   Global Physical Health Score 10   PROMIS TOTAL - SUBSCORES 20   Some recent data might be hidden                Sarah Walters, ATC

## 2018-11-30 NOTE — LETTER
11/30/2018       RE: Elizabeth Taylor  [address]  [place] [address]     Dear Colleague,    Thank you for referring your patient, Elizabeth Taylor, to the [facility] at [facility]. Please see a copy of my visit note below.    Spine Surgery Return Clinic Visit      Chief Complaint:   No chief complaint on file.      Interval HPI:  Symptom Profile Including: location of symptoms, onset, severity, exacerbating/alleviating factors, previous treatments:        Elizabeth Taylor is a 78 year old female who has a history of right hemiarthroplasty for hip fracture in the past and then sustained left rami fracture 4 weeks ago with a fall.  She was hospitalized and then discharged to rehab.  She follows up today now 4 weeks out.  She states she was making good progress until she had a break in her therapy.  She again feels like she has begun to make progress again.  She notes muscle spasms in the anterior aspect of the hip which has improved with adjustments in her medications.  She  has been ambulating with a walker. She has no pain in the right hip and is very happy with this.     Since I last saw her a couple of weeks ago the left hip pain is been gradually improving and she says over the last 3 days she has no pain whatsoever.  She has been walking with a walker 4 times a day.  She is happy with her progress.  More recently she is been dealing with some left shoulder pain and wonders if she can start therapy for this.       Past Medical History:     Past Medical History:   Diagnosis Date     Anal cancer (H)      Endometriosis, site unspecified      Thyroiditis, unspecified     Thryoiditis-resolved            Past Surgical History:     Past Surgical History:   Procedure Laterality Date     APPENDECTOMY      as a child     ARTHROPLASTY HIP Right 7/26/2018    Procedure: ARTHROPLASTY HIP;  Right Hip Hemiarthroplasty;  Surgeon: Sylvester  Zaire Blanca MD;  [facility]     COLONOSCOPY N/A 4/13/2017    Procedure: COLONOSCOPY;  Surgeon: Juan Dos Santos MD;  Location: [facility] GI     INSERT PORT VASCULAR ACCESS Right 2/8/2018    Procedure: INSERT PORT VASCULAR ACCESS;  Place Single Lumen Venous Chest Port Placement;  Surgeon: Zaire Moreira PA-C;  [facility]     SURGICAL HISTORY OF -       endometriosis            Social History:     Social History   Substance Use Topics     Smoking status: Never Smoker     Smokeless tobacco: Never Used     Alcohol use No            Family History:     Family History   Problem Relation Age of Onset     Cancer Sister      Cancer Maternal Grandmother      lymphoma     Heart Disease Father      MI     Uterine Cancer Niece             Allergies:     Allergies   Allergen Reactions     Darvon [Propoxyphene]      Had reaction in teen years     Penicillins      As a child     Ketoconazole Rash            Medications:     Current Outpatient Prescriptions   Medication     acetaminophen (TYLENOL) 500 MG tablet     bisacodyl (DULCOLAX) 10 MG Suppository     calcium-vitamin D (CALTRATE) 600-400 MG-UNIT per tablet     cholecalciferol 5000 units CAPS     docusate sodium (COLACE) 100 MG capsule     ferrous sulfate (IRON) 325 (65 Fe) MG tablet     Lidocaine (LIDOCARE) 4 % Patch     methocarbamol (ROBAXIN) 500 MG tablet     oxyCODONE IR (ROXICODONE) 5 MG tablet     polyethylene glycol (MIRALAX/GLYCOLAX) Packet     simethicone (MYLICON) 40 MG/0.6ML suspension     traMADol (ULTRAM) 50 MG tablet     vitamin B complex with vitamin C (VITAMIN  B COMPLEX) TABS tablet     VITAMIN E PO     No current facility-administered medications for this visit.              Review of Systems:   A focused musculoskeletal and neurologic ROS was performed with pertinent positives and negatives noted in the HPI.  Additional systems were also reviewed and are documented at the bottom of the note.         Physical Exam:   Vitals: LMP   (LMP Unknown)  Musculoskeletal, Neurologic, and Spine:   Focused examination of the lower extremities reveals no pain with gentle range of motion of the right hip.     No pain with range of motion of the left hip.  She has a positive Neer and West sign and some grinding of the left shoulder with attempted elevation.         Imaging:   We ordered and independently reviewed new radiographs at this clinic visit. The results were discussed with the patient. Findings include:     AP and lateral right hip radiographs and AP and LAT views of the pelvis show some interval callus formation around the left pubic rami fractures and no evidence of complication of the right hip arthroplasty.       Assessment and Plan:     78 year old female with Left LC 1 pelvic ring injury sustained 11/1/18 she also complains of some left rotator cuff pathology today     I do agree she likely has a left rotator cuff injury.  I am going to give her a prescription for therapy.  I think her hip will go on to heal well.  She can weight-bear as tolerated at this point.  Follow-up in 3 months with AP and lateral right hip and inlet outlet pelvic radiographs.      Again, thank you for allowing me to participate in the care of your patient.      Sincerely,    Zaire Phan MD

## 2018-11-30 NOTE — MR AVS SNAPSHOT
After Visit Summary   11/30/2018    Elizabeth Taylor    MRN: [id number]           Patient Information     Date Of Birth          1940        Visit Information        Provider Department      11/30/2018 11:00 AM Zaire Phan MD [facility]        Today's Diagnoses     History of total hip replacement, unspecified laterality    -  1    Age-related osteoporosis with current pathological fracture, initial encounter        Disorder of bone and cartilage        Chronic left shoulder pain           Follow-ups after your visit        Additional Services     PHYSICAL THERAPY REFERRAL (External-Prints)       Physical Therapy Referral                  Your next 10 appointments already scheduled     Feb 22, 2019  9:00 AM CST   [facility] with  [facility]   [facility] ([facility])    [address]  [address]  [facility] [id number] [phone]            Feb 22, 2019  9:40 AM CST   CT ABDOMEN PELVIS W CONTRAST with UC[facility]   [facility] CT ([facility])    [address]  1st Floor  [facility] [id number] [phone]           How do I prepare for my exam? (Food and drink instructions) To prepare: Do not eat or drink for 2 hours before your exam. If you need to take medicine, you may take it with small sips of water. (We may ask you to take liquid medicine as well.)  How do I prepare for my exam? (Other instructions) Please arrive 30 minutes early for your CT.  Once in the department you might be asked to drink water 15-20 minutes prior to your exam.  If indicated you may be asked to drink an oral contrast in advance of your CT.  If this is the case, the imaging team will let you know or be in contact with you prior to your appointment  Patients over 70 or patients with diabetes or kidney problems: If you haven t had a blood test (creatinine test) within the last 30  days, the Cardiologist/Radiologist may require you to get this test prior to your exam.  If you have diabetes:  Continue to take your metformin medication on the day of your exam  What should I wear: Please wear loose clothing, such as a sweat suit or jogging clothes. Avoid snaps, zippers and other metal. We may ask you to undress and put on a hospital gown.  How long does the exam take: Most scans take less than 20 minutes.  What should I bring: Please bring any scans or X-rays taken at other hospitals, if similar tests were done. Also bring a list of your medicines, including vitamins, minerals and over-the-counter drugs. It is safest to leave personal items at home.  Do I need a : No  is needed.  What do I need to tell my doctor? Be sure to tell your doctor: * If you have any allergies. * If there s any chance you are pregnant. * If you are breastfeeding.  What should I do after the exam: No restrictions, You may resume normal activities.  What is this test: A CT (computed tomography) scan is a series of pictures that allows us to look inside your body. The scanner creates images of the body in cross sections, much like slices of bread. This helps us see any problems more clearly. You may receive contrast (X-ray dye) before or during your scan. You will be asked to drink the contrast.  Who should I call with questions: If you have any questions, please call the Imaging Department where you will have your exam. Directions, parking instructions, and other information is available on our website, [email or web address].            Feb 22, 2019 12:30 PM CST   (Arrive by 12:15 PM)   Return Visit with Shen Ray MD   [facility] ([facility])    [address]  [address]  [facility] [address] [phone]            Feb 22, 2019  2:00 PM CST   Return Visit with Zaire Madison MD   [facility] On[facility] Clinic ([facility])    [facility]  [facility]  1st Floor  [address] [facility] [address]   [phone]            Mar 08, 2019 11:00 AM CST   (Arrive by 10:30 AM)   RETURN SPINE with Zaire Phan MD   [facility] ([facility])    [address] [place] [address] [phone]            May 20, 2019 11:00 AM CDT   (Arrive by 10:45 AM)   Return Visit with Emir Rosas MD   [facility] ([facility])    [address] [place] [address] [phone]              Future tests that were ordered for you today     Open Future Orders        Priority Expected Expires Ordered    PHYSICAL THERAPY REFERRAL (External-Prints) Routine  11/30/2019 11/30/2018    DX Hip/Pelvis/Spine Routine  11/30/2019 11/30/2018            Who to contact     Please call your clinic at [phone] to:    Ask questions about your health    Make or cancel appointments    Discuss your medicines    Learn about your test results    Speak to your doctor            Additional Information About Your Visit        Planwise Information     Planwise gives you secure access to your electronic health record. If you see a primary care provider, you can also send messages to your care team and make appointments. If you have questions, please call your primary care clinic.  If you do not have a primary care provider, please call [phone] and they will assist you.      Planwise is an electronic gateway that provides easy, online access to your medical records. With Planwise, you can request a clinic appointment, read your test results, renew a prescription or communicate with your care team.     To access your existing account, please contact your [facility] or call [phone] for assistance.        Care EveryWhere ID     This is your Care EveryWhere ID. This could be used by  other organizations to access your [facility] medical records  [id number]        Your Vitals Were     Last Period                   (LMP Unknown)            Blood Pressure from Last 3 Encounters:   11/26/18 115/73   11/26/18 102/63   11/05/18 117/60    Weight from Last 3 Encounters:   11/26/18 55.2 kg (121 lb 11.2 oz)   11/16/18 52.6 kg (116 lb)   11/01/18 54.4 kg (120 lb)               Primary Care Provider Office Phone # Fax #    Baron Seth [phone] [address]  [facility] [address]        Equal Access to Services     DOC DALEY : Hadii chelo kramer hadasho Sotheodoreali, waaxda luqadaha, qaybta kaalmada adeargentinayada, cyndy catalan. So [facility] [phone].    ATENCIÓN: Si habla español, tiene a ghosh disposición servicios gratuitos de asistencia lingüística. [facility] [phone].    We comply with applicable federal civil rights laws and [place] laws. We do not discriminate on the basis of race, color, national origin, age, disability, sex, sexual orientation, or gender identity.            Thank you!     Thank you for choosing [facility]  for your care. Our goal is always to provide you with excellent care. Hearing back from our patients is one way we can continue to improve our services. Please take a few minutes to complete the written survey that you may receive in the mail after your visit with us. Thank you!             Your Updated Medication List - Protect others around you: Learn how to safely use, store and throw away your medicines at www.disposemymeds.org.          This list is accurate as of 11/30/18 12:40 PM.  Always use your most recent med list.                   Brand Name Dispense Instructions for use Diagnosis    acetaminophen 500 MG tablet    TYLENOL     Take 1,000 mg by mouth 3 times daily        bisacodyl 10 MG suppository    DULCOLAX    30 suppository    Place 1 suppository (10 mg) rectally daily as needed for constipation     Drug-induced constipation       calcium carbonate 600 mg-vitamin D 400 units 600-400 MG-UNIT per tablet    CALTRATE    60 tablet    Take 1 tablet by mouth daily    Age-related osteoporosis with current pathological fracture, initial encounter       cholecalciferol 5000 units Caps      Take 1 capsule (5,000 Units) by mouth daily    Age-related osteoporosis with current pathological fracture, initial encounter, Pathological fracture of pelvis, unspecified pathological cause, initial encounter       docusate sodium 100 MG capsule    COLACE    60 capsule    Take 1 capsule (100 mg) by mouth 2 times daily    Drug-induced constipation       ferrous sulfate 325 (65 Fe) MG tablet    FEROSUL     Take 325 mg by mouth daily (with breakfast)        Lidocaine 4 % Patch    LIDOCARE     Place 2 patches onto the skin every 24 hours    Pathological fracture of pelvis, unspecified pathological cause, initial encounter       methocarbamol 500 MG tablet    ROBAXIN    120 tablet    Take 1 tablet (500 mg) by mouth every 6 hours as needed for muscle spasms    Pathological fracture of pelvis, unspecified pathological cause, initial encounter       oxyCODONE 5 MG tablet    ROXICODONE    10 tablet    Take 0.5 tablets (2.5 mg) by mouth every 4 hours as needed for moderate to severe pain    Pathological fracture of pelvis, unspecified pathological cause, initial encounter       polyethylene glycol packet    MIRALAX/GLYCOLAX    7 packet    Take 17 g by mouth daily as needed for constipation    Drug-induced constipation       simethicone 40 MG/0.6ML suspension    MYLICON     Take 0.6 mLs (40 mg) by mouth every 6 hours as needed for cramping    Malignant neoplasm of anal canal (H), Flatulence, eructation and gas pain       traMADol 50 MG tablet    ULTRAM    30 tablet    Take 1 tablet (50 mg) by mouth every 6 hours as needed for severe pain    Malignant neoplasm of anal canal (H)       vitamin B complex with vitamin C tablet      Take 1 tablet by  mouth daily        VITAMIN E PO      Take 1 capsule by mouth daily

## 2018-11-30 NOTE — PROGRESS NOTES
Spine Surgery Return Clinic Visit      Chief Complaint:   No chief complaint on file.      Interval HPI:  Symptom Profile Including: location of symptoms, onset, severity, exacerbating/alleviating factors, previous treatments:        Elizabeth Taylor is a 78 year old female who has a history of right hemiarthroplasty for hip fracture in the past and then sustained left rami fracture 4 weeks ago with a fall.  She was hospitalized and then discharged to rehab.  She follows up today now 4 weeks out.  She states she was making good progress until she had a break in her therapy.  She again feels like she has begun to make progress again.  She notes muscle spasms in the anterior aspect of the hip which has improved with adjustments in her medications.  She has been ambulating with a walker. She has no pain in the right hip and is very happy with this.     Since I last saw her a couple of weeks ago the left hip pain is been gradually improving and she says over the last 3 days she has no pain whatsoever.  She has been walking with a walker 4 times a day.  She is happy with her progress.  More recently she is been dealing with some left shoulder pain and wonders if she can start therapy for this.       Past Medical History:     Past Medical History:   Diagnosis Date     Anal cancer (H)      Endometriosis, site unspecified      Thyroiditis, unspecified     Thryoiditis-resolved            Past Surgical History:     Past Surgical History:   Procedure Laterality Date     APPENDECTOMY      as a child     ARTHROPLASTY HIP Right 7/26/2018    Procedure: ARTHROPLASTY HIP;  Right Hip Hemiarthroplasty;  Surgeon: Zaire Phan MD;  [facility]     COLONOSCOPY N/A 4/13/2017    Procedure: COLONOSCOPY;  Surgeon: Juan Dos Santos MD;  Location: [facility] GI     INSERT PORT VASCULAR ACCESS Right 2/8/2018    Procedure: INSERT PORT VASCULAR ACCESS;  Place Single Lumen Venous Chest Port Placement;  Surgeon: Lillie  Zaire Canada PA-C;  [facility]     SURGICAL HISTORY OF -       endometriosis            Social History:     Social History   Substance Use Topics     Smoking status: Never Smoker     Smokeless tobacco: Never Used     Alcohol use No            Family History:     Family History   Problem Relation Age of Onset     Cancer Sister      Cancer Maternal Grandmother      lymphoma     Heart Disease Father      MI     Uterine Cancer Niece             Allergies:     Allergies   Allergen Reactions     Darvon [Propoxyphene]      Had reaction in teen years     Penicillins      As a child     Ketoconazole Rash            Medications:     Current Outpatient Prescriptions   Medication     acetaminophen (TYLENOL) 500 MG tablet     bisacodyl (DULCOLAX) 10 MG Suppository     calcium-vitamin D (CALTRATE) 600-400 MG-UNIT per tablet     cholecalciferol 5000 units CAPS     docusate sodium (COLACE) 100 MG capsule     ferrous sulfate (IRON) 325 (65 Fe) MG tablet     Lidocaine (LIDOCARE) 4 % Patch     methocarbamol (ROBAXIN) 500 MG tablet     oxyCODONE IR (ROXICODONE) 5 MG tablet     polyethylene glycol (MIRALAX/GLYCOLAX) Packet     simethicone (MYLICON) 40 MG/0.6ML suspension     traMADol (ULTRAM) 50 MG tablet     vitamin B complex with vitamin C (VITAMIN  B COMPLEX) TABS tablet     VITAMIN E PO     No current facility-administered medications for this visit.              Review of Systems:   A focused musculoskeletal and neurologic ROS was performed with pertinent positives and negatives noted in the HPI.  Additional systems were also reviewed and are documented at the bottom of the note.         Physical Exam:   Vitals: LMP  (LMP Unknown)  Musculoskeletal, Neurologic, and Spine:   Focused examination of the lower extremities reveals no pain with gentle range of motion of the right hip.    No pain with range of motion of the left hip.  She has a positive Neer and West sign and some grinding of the left shoulder with attempted  elevation.         Imaging:   We ordered and independently reviewed new radiographs at this clinic visit. The results were discussed with the patient. Findings include:     AP and lateral right hip radiographs and AP and LAT views of the pelvis show some interval callus formation around the left pubic rami fractures and no evidence of complication of the right hip arthroplasty.       Assessment and Plan:     78 year old female with Left LC 1 pelvic ring injury sustained 11/1/18 she also complains of some left rotator cuff pathology today     I do agree she likely has a left rotator cuff injury.  I am going to give her a prescription for therapy.  I think her hip will go on to heal well.  She can weight-bear as tolerated at this point.  Follow-up in 3 months with AP and lateral right hip and inlet outlet pelvic radiographs.      Respectfully,  Zaire Phan MD  Spine Surgery  [facility]

## 2018-12-05 ENCOUNTER — PATIENT OUTREACH (OUTPATIENT)
Dept: CARE COORDINATION | Facility: CLINIC | Age: 78
End: 2018-12-05

## 2018-12-05 DIAGNOSIS — S72.009A FEMORAL NECK FRACTURE (H): Primary | ICD-10-CM

## 2018-12-05 NOTE — PROGRESS NOTES
Clinic Care Coordination Contact    Situation: Patient chart reviewed by care coordinator.    Background: Patient was hospitalized at [facility] 11/1-11/5/2018- with diagnosis of   Discharge Diagnoses      1. S/p ground level fall 2 weeks prior   2. Traumatic right femoral neck fracture s/p right hemiarthroplasty 7/26/18  3. Acute traumatic pain       Other diagnoses:  1. Stage III anal cancer s/p Chemo and radiation   2. Limited mobility 2/2 # 1   3. Recent catheter related wound due to perianal radiation   4. Hx of subacute right sided rib fractures 8-10 (dx 3/18)  5. Peripheral edema   C. Diff diarrhea     Assessment: Patient continue to be at the [facility]     Plan/Recommendations: CC will follow up when discharged from TCU    Quynh Mckeon RN / Clinical Care Coordinator     [facility]   [email or web address]  Office :  [phone] / Fax :  [phone]

## 2018-12-20 ENCOUNTER — TELEPHONE (OUTPATIENT)
Dept: FAMILY MEDICINE | Facility: CLINIC | Age: 78
End: 2018-12-20

## 2018-12-20 NOTE — TELEPHONE ENCOUNTER
Reason for Call: Request for an order or referral:    Order or referral being requested: quantiferon tb gold    Date needed: as soon as possible    Additional comments: Shweta calling to request orders on behalf of patient. Also to discuss some medications patient no longer wants to take.    Shweta- [phone]  [phone]      Call taken on 12/20/2018 at 3:48 PM by Shari Fernandes

## 2018-12-24 NOTE — TELEPHONE ENCOUNTER
Phone call to [place] [facility] [place] - nurses are not in yet this am will need to call back at later time     Alicia Bearden Registered Nurse   [facility]

## 2018-12-31 NOTE — TELEPHONE ENCOUNTER
Nurse from [place] called back and she said the issues were already addressed by EBBETO Strange before Vera.  Needed order for Q gold and got that.  No other unresolved questions.  Stephanie Werner RN

## 2018-12-31 NOTE — TELEPHONE ENCOUNTER
Another message left for wellness office staff to return call to triage     Alicia Bearden Registered Nurse   [facility]

## 2019-01-08 ENCOUNTER — PATIENT OUTREACH (OUTPATIENT)
Dept: CARE COORDINATION | Facility: CLINIC | Age: 79
End: 2019-01-08

## 2019-01-08 NOTE — PROGRESS NOTES
Clinic Care Coordination Contact    Situation: Patient chart reviewed by care coordinator.    Background: Background: Patient was hospitalized at [facility] 11/1-11/5/2018- with diagnosis of   Discharge Diagnoses      1. S/p ground level fall 2 weeks prior   2. Traumatic right femoral neck fracture s/p right hemiarthroplasty 7/26/18  3. Acute traumatic pain       Other diagnoses:  1. Stage III anal cancer s/p Chemo and radiation   2. Limited mobility 2/2 # 1   3. Recent catheter related wound due to perianal radiation   4. Hx of subacute right sided rib fractures 8-10 (dx 3/18)  5. Peripheral edema   C. Diff diarrhea     Assessment: Patient is at the [facility]  CC left a message with contact information to call back with an update on the patient on [facility] wellness office  [phone]     Plan/Recommendations: CC will follow up when discharged fro Assisted Living     Quynh Mckeon RN / Clinical Care Coordinator     [facility]    [facility]   [email or web address]  Office :  [phone] / Fax :  [phone]

## 2019-01-11 NOTE — PROGRESS NOTES
Clinic Care Coordination Contact  Care Team Conversations      Carri Sudan SW ( [phone]) left a message stating patient will have an extention for another week stay and then go to her sisters     CC will follow up when discharged     Quynh Mckeon RN / Clinical Care Coordinator     [facility]   [email or web address]  Office :  [phone] / Fax :  [phone]

## 2019-01-15 ENCOUNTER — TELEPHONE (OUTPATIENT)
Dept: FAMILY MEDICINE | Facility: CLINIC | Age: 79
End: 2019-01-15

## 2019-01-15 DIAGNOSIS — M84.454A PATHOLOGICAL FRACTURE OF PELVIS, UNSPECIFIED PATHOLOGICAL CAUSE, INITIAL ENCOUNTER: ICD-10-CM

## 2019-01-15 NOTE — TELEPHONE ENCOUNTER
[facility] is calling requesting a refill of Norco.  Patient is out of them.  Originally got them from a dr while she was in Transitional Care.  Has appt with Dr Seth this Thursday, 1-17-19, for this same request.  Wants med now, rather than waiting till Thurs.  Please call them.

## 2019-01-15 NOTE — TELEPHONE ENCOUNTER
Dr. Seth Is there any way you would be filling oxycontin 2.5 mg tab for this patient?  This was last given to her for her Fx hip through transitional care.  She does have an appointment to see you on Thursday. I did tell nursing staff it was late in the day and we are missing information about this medication like when last filled etc.   Stephanie Werner RN

## 2019-01-16 NOTE — TELEPHONE ENCOUNTER
I haven't seen her since before the surgery and would not feel comfortable signing for oxycontin. Did she contact the TCU. Usually I would think they would give meds enough until she is seen at least.

## 2019-01-16 NOTE — TELEPHONE ENCOUNTER
Writer called [facility] and was transferred to [facility]'s voicemail.  Writer was informed Trena is at the facility, but not available currently.    Writer left detailed message on [facility]'s [facility] relaying message per Dr. Seth.    NELLA Fernandez, BSN, RN

## 2019-01-17 ENCOUNTER — ANCILLARY PROCEDURE (OUTPATIENT)
Dept: BONE DENSITY | Facility: CLINIC | Age: 79
End: 2019-01-17
Payer: MEDICARE

## 2019-01-17 ENCOUNTER — OFFICE VISIT (OUTPATIENT)
Dept: FAMILY MEDICINE | Facility: CLINIC | Age: 79
End: 2019-01-17
Payer: MEDICARE

## 2019-01-17 VITALS
HEART RATE: 78 BPM | RESPIRATION RATE: 16 BRPM | SYSTOLIC BLOOD PRESSURE: 110 MMHG | BODY MASS INDEX: 21.27 KG/M2 | WEIGHT: 124 LBS | TEMPERATURE: 97 F | DIASTOLIC BLOOD PRESSURE: 80 MMHG | OXYGEN SATURATION: 96 %

## 2019-01-17 DIAGNOSIS — M89.9 DISORDER OF BONE AND CARTILAGE: ICD-10-CM

## 2019-01-17 DIAGNOSIS — M94.9 DISORDER OF BONE AND CARTILAGE: ICD-10-CM

## 2019-01-17 DIAGNOSIS — M81.0 OSTEOPOROSIS, UNSPECIFIED OSTEOPOROSIS TYPE, UNSPECIFIED PATHOLOGICAL FRACTURE PRESENCE: ICD-10-CM

## 2019-01-17 DIAGNOSIS — C21.1 MALIGNANT NEOPLASM OF ANAL CANAL (H): ICD-10-CM

## 2019-01-17 DIAGNOSIS — Z96.641 STATUS POST TOTAL REPLACEMENT OF RIGHT HIP: ICD-10-CM

## 2019-01-17 DIAGNOSIS — M84.454A PATHOLOGICAL FRACTURE OF PELVIS, UNSPECIFIED PATHOLOGICAL CAUSE, INITIAL ENCOUNTER: Primary | ICD-10-CM

## 2019-01-17 PROCEDURE — 99214 OFFICE O/P EST MOD 30 MIN: CPT | Performed by: FAMILY MEDICINE

## 2019-01-17 RX ORDER — OXYCODONE HYDROCHLORIDE 5 MG/1
2.5 TABLET ORAL EVERY 4 HOURS PRN
Qty: 30 TABLET | Refills: 0 | Status: SHIPPED | OUTPATIENT
Start: 2019-01-17 | End: 2019-04-26

## 2019-01-17 RX ORDER — AZITHROMYCIN 250 MG/1
TABLET, FILM COATED ORAL
Qty: 2 TABLET | Refills: 3 | Status: SHIPPED | OUTPATIENT
Start: 2019-01-17 | End: 2019-03-07

## 2019-01-17 NOTE — PROGRESS NOTES
SUBJECTIVE:   Elizabeth Taylor is a 78 year old female who presents to clinic today for the following health issues:    1: Transitioning to home from TCU. Improving overall especially with less pain over the past week. She does not think the lidoderm patches have been very helpful. She is taking 3000mg tylenol daily and taking 2.5 mg oxycodone 2 times in the evening. It seems she gets some tightness in her thighs bilateral/anteriorly worse in the evening.     2: Fracture-has had some falls over the past few weeks. Needs help with showering. Using a walker most of the time. Just had a dexa scan today.     3: dental work. Ortho told her she should take antibiotic prophylaxis prior to dental procedures. She has a hx of c diff.     4; vaginitis/dermatitis from radiation from the cancer. Overall improved since she is having home care help with the local wound care.     med hx, family hx, and soc hx reviewed and updated     No gi, resp, cv symptoms     Eating well.     OBJECTIVE: /80   Pulse 78   Temp 97  F (36.1  C) (Oral)   Resp 16   Wt 56.2 kg (124 lb)   LMP  (LMP Unknown)   SpO2 96%   BMI 21.27 kg/m   no apparent distress   Exam:  GENERAL APPEARANCE: healthy, alert and no distress  CV: regular rates and rhythm, normal S1 S2, no S3 or S4 and no murmur, click or rub -  ABDOMEN:  soft, nontender, no HSM or masses and bowel sounds normal  SKIN: erythema of the ext genitalia and perineum but much less so than I saw her last.   No ulcerations.       ICD-10-CM    1. Pathological fracture of pelvis, unspecified pathological cause, initial encounter M84.454A oxyCODONE (ROXICODONE) 5 MG tablet   2. Status post total replacement of right hip Z96.641 azithromycin (ZITHROMAX) 250 MG tablet   3. Malignant neoplasm of anal canal (H) C21.1    4. Osteoporosis, unspecified osteoporosis type, unspecified pathological fracture presence M81.0      Refilled oxycodone. If still needed in a month. Consider pain clinic  consultation. zithromax for prophylaxis due to pcn allergy. To see ortho next week. We do not have dexa scan but very likely has osteoporosis given fragility fracture of her right hip. In the past she has refused testing and treatment of osteoporosis.

## 2019-01-18 DIAGNOSIS — M80.00XA AGE-RELATED OSTEOPOROSIS WITH CURRENT PATHOLOGICAL FRACTURE, INITIAL ENCOUNTER: ICD-10-CM

## 2019-01-18 DIAGNOSIS — M84.454A PATHOLOGICAL FRACTURE OF PELVIS, UNSPECIFIED PATHOLOGICAL CAUSE, INITIAL ENCOUNTER: ICD-10-CM

## 2019-01-18 NOTE — TELEPHONE ENCOUNTER
"Requested Prescriptions   Pending Prescriptions Disp Refills     cholecalciferol (VITAMIN D3) 5000 units (125 mcg) capsule [Pharmacy Med Name: VITAMIN D3 5000 UNIT CAPSULE]  Last Written Prescription Date:  18  Last Fill Quantity: 0,  # refills: 0   Last office visit: 19 with prescribing provider:  DOMINIK OWEN    Future Office Visit:   Next 5 appointments (look out 90 days)    2019 11:20 AM CST  SHORT with Baron Seth MD  [facility] ([address] [address]  [phone]       30 capsule 10     Si CAP BY MOUTH DAILY (DX: OSTEOPOROSIS)    Vitamin Supplements (Adult) Protocol Passed - 2019  8:43 AM       Passed - High dose Vitamin D not ordered       Passed - Recent (12 mo) or future (30 days) visit within the authorizing provider's specialty    Patient had office visit in the last 12 months or has a visit in the next 30 days with authorizing provider or within the authorizing provider's specialty.  See \"Patient Info\" tab in inbasket, or \"Choose Columns\" in Meds & Orders section of the refill encounter.           Passed - Medication is active on med list         "

## 2019-01-21 DIAGNOSIS — M25.551 RIGHT HIP PAIN: Primary | ICD-10-CM

## 2019-01-21 NOTE — TELEPHONE ENCOUNTER
Recent dexa scan    Refill request to MD/primary care provider in case of any change in treatment plan.         Barbara Walters RN

## 2019-01-24 ENCOUNTER — TELEPHONE (OUTPATIENT)
Dept: FAMILY MEDICINE | Facility: CLINIC | Age: 79
End: 2019-01-24

## 2019-01-24 NOTE — TELEPHONE ENCOUNTER
Dr rushing  Please see msg below from  and advise-ok with you if she doesn't get home care?  Thanks!     Dianna Vincent RN

## 2019-01-24 NOTE — TELEPHONE ENCOUNTER
Reason for Call:  Other call back    Detailed comments: Ana Paula from homecare called and is updating that Elizabeth is declining homecare, she said she is being well taken care of. Please advise thank you    Phone Number Patient can be reached at: Other phone number:  [phone]    Best Time: anytime    Can we leave a detailed message on this number? YES    Call taken on 1/24/2019 at 1:51 PM by Denisse Lara

## 2019-01-24 NOTE — TELEPHONE ENCOUNTER
Called to let home care know that Dr. Seth is aware of the patients decision.  Stephanie Werner RN

## 2019-01-25 ENCOUNTER — ANCILLARY PROCEDURE (OUTPATIENT)
Dept: GENERAL RADIOLOGY | Facility: CLINIC | Age: 79
End: 2019-01-25
Payer: MEDICARE

## 2019-01-25 ENCOUNTER — OFFICE VISIT (OUTPATIENT)
Dept: ORTHOPEDICS | Facility: CLINIC | Age: 79
End: 2019-01-25
Payer: MEDICARE

## 2019-01-25 VITALS — WEIGHT: 124 LBS | BODY MASS INDEX: 21.17 KG/M2 | HEIGHT: 64 IN

## 2019-01-25 DIAGNOSIS — M80.00XD AGE-RELATED OSTEOPOROSIS WITH CURRENT PATHOLOGICAL FRACTURE WITH ROUTINE HEALING, SUBSEQUENT ENCOUNTER: ICD-10-CM

## 2019-01-25 DIAGNOSIS — Z96.641 HISTORY OF TOTAL RIGHT HIP REPLACEMENT: Primary | ICD-10-CM

## 2019-01-25 ASSESSMENT — ENCOUNTER SYMPTOMS
WEAKNESS: 1
LOSS OF CONSCIOUSNESS: 0
NECK PAIN: 0
ARTHRALGIAS: 1
SEIZURES: 0
NUMBNESS: 1
MUSCLE WEAKNESS: 1
MEMORY LOSS: 1
STIFFNESS: 1
TINGLING: 0
MUSCLE CRAMPS: 1
HEADACHES: 0
MYALGIAS: 1
JOINT SWELLING: 0
BACK PAIN: 1
TREMORS: 0
PARALYSIS: 0
DISTURBANCES IN COORDINATION: 1
SPEECH CHANGE: 1
DIZZINESS: 0

## 2019-01-25 ASSESSMENT — MIFFLIN-ST. JEOR: SCORE: 1022.46

## 2019-01-25 NOTE — PROGRESS NOTES
Spine Surgery Return Clinic Visit      Chief Complaint:   Surgical Followup (Right hip hemiarthroplasty patient having pain )      Interval HPI:  Symptom Profile Including: location of symptoms, onset, severity, exacerbating/alleviating factors, previous treatments:        Elizabeth Taylor is a 78 year old female who returns for evaluation of right hip hemiarthroplasty as well as nondisplaced left pubic rami fracture.  She says she is doing well overall.  She is been increasing her activities and with increased activity she is noticed some increase in herHip pain, but overall she is very happy with her procedure and she feels very glad that she had the surgery done.  Right leg is examined no pain with hip log roll incision is well-healed neurovascular intact            Past Medical History:     Past Medical History:   Diagnosis Date     Anal cancer (H)      Endometriosis, site unspecified      Thyroiditis, unspecified     Thryoiditis-resolved            Past Surgical History:     Past Surgical History:   Procedure Laterality Date     APPENDECTOMY      as a child     ARTHROPLASTY HIP Right 7/26/2018    Procedure: ARTHROPLASTY HIP;  Right Hip Hemiarthroplasty;  Surgeon: Zaire Phan MD;  Location: [facility]     COLONOSCOPY N/A 4/13/2017    Procedure: COLONOSCOPY;  Surgeon: Juan Dos Santos MD;  Location: [facility] GI     INSERT PORT VASCULAR ACCESS Right 2/8/2018    Procedure: INSERT PORT VASCULAR ACCESS;  Place Single Lumen Venous Chest Port Placement;  Surgeon: Zaire Moreira PA-C;  [facility]     SURGICAL HISTORY OF -       endometriosis            Social History:     Social History     Tobacco Use     Smoking status: Never Smoker     Smokeless tobacco: Never Used   Substance Use Topics     Alcohol use: No            Family History:     Family History   Problem Relation Age of Onset     Cancer Sister      Cancer Maternal Grandmother         lymphoma     Heart Disease Father         MI  "    Uterine Cancer Niece             Allergies:     Allergies   Allergen Reactions     Darvon [Propoxyphene]      Had reaction in teen years     Penicillins      As a child     Ketoconazole Rash            Medications:     Current Outpatient Medications   Medication     acetaminophen (TYLENOL) 500 MG tablet     calcium-vitamin D (CALTRATE) 600-400 MG-UNIT per tablet     cholecalciferol (VITAMIN D3) 5000 units (125 mcg) capsule     oxyCODONE (ROXICODONE) 5 MG tablet     polyethylene glycol (MIRALAX/GLYCOLAX) Packet     azithromycin (ZITHROMAX) 250 MG tablet     bisacodyl (DULCOLAX) 10 MG Suppository     docusate sodium (COLACE) 100 MG capsule     ferrous sulfate (IRON) 325 (65 Fe) MG tablet     Lidocaine (LIDOCARE) 4 % Patch     simethicone (MYLICON) 40 MG/0.6ML suspension     vitamin B complex with vitamin C (VITAMIN  B COMPLEX) TABS tablet     VITAMIN E PO     No current facility-administered medications for this visit.              Review of Systems:   A focused musculoskeletal and neurologic ROS was performed with pertinent positives and negatives noted in the HPI.  Additional systems were also reviewed and are documented at the bottom of the note.         Physical Exam:   Vitals: Ht 1.626 m (5' 4\")   Wt 56.2 kg (124 lb)   LMP  (LMP Unknown)   BMI 21.28 kg/m    Musculoskeletal, Neurologic, and Spine:   Neurologically intact superficial peroneal deep peroneal and tibial nerve distributions.  Right hip is examined, incision healed, no pain with hip log roll,         Imaging:   We ordered and independently reviewed new radiographs at this clinic visit. The results were discussed with the patient. Findings include:     AP and lateral as well as inlet outlet pelvic views show healing left pubic rami fractures and right hip hemiarthroplasty without complication       Assessment and Plan:     78 year old female progressing as expected status post right hip hemiarthroplasty.  She had a recent bone density study which " showed a radius T score of -9 which is dramatically low.  I have ordered an endocrinology referral for bone health.  She is going to continue vitamin D and calcium.  Follow-up in 6 months with repeat pelvic and right hip radiographs at that time           Respectfully,  Zaire Phan MD  Spine Surgery  [facility]    Answers for HPI/ROS submitted by the patient on 1/25/2019   General Symptoms: No  Skin Symptoms: No  HENT Symptoms: No  EYE SYMPTOMS: No  HEART SYMPTOMS: No  LUNG SYMPTOMS: No  INTESTINAL SYMPTOMS: No  URINARY SYMPTOMS: No  GYNECOLOGIC SYMPTOMS: No  BREAST SYMPTOMS: No  SKELETAL SYMPTOMS: Yes  BLOOD SYMPTOMS: No  NERVOUS SYSTEM SYMPTOMS: Yes  MENTAL HEALTH SYMPTOMS: No  Back pain: Yes  Muscle aches: Yes  Neck pain: No  Swollen joints: No  Joint pain: Yes  Bone pain: Yes  Muscle cramps: Yes  Muscle weakness: Yes  Joint stiffness: Yes  Bone fracture: Yes  Trouble with coordination: Yes  Dizziness or trouble with balance: No  Fainting or black-out spells: No  Memory loss: Yes  Headache: No  Seizures: No  Speech problems: Yes  Tingling: No  Tremor: No  Weakness: Yes  Difficulty walking: Yes  Paralysis: No  Numbness: Yes

## 2019-01-25 NOTE — LETTER
1/25/2019       RE: Elizabeth Taylor  [address]  [place] [facility] [address]     Dear Colleague,    Thank you for referring your patient, Elizabeth Taylor, to the [facility]. Please see a copy of my visit note below.    Spine Surgery Return Clinic Visit      Chief Complaint:   Surgical Followup (Right hip hemiarthroplasty patient having pain )      Interval HPI:  Symptom Profile Including: location of symptoms, onset, severity, exacerbating/alleviating factors, previous treatments:        Elizabeth Taylor is a 78 year old female who returns for evaluation of right hip hemiarthroplasty as well as nondisplaced left pubic rami fracture.  She says she is doing well overall.  She is been increasing her activities and with increased activity she is noticed some increase in herHip pain, but overall she is very happy with her procedure and she feels very glad that she had the surgery done.  Right leg is examined no pain with hip log roll incision is well-healed neurovascular intact            Past Medical History:     Past Medical History:   Diagnosis Date     Anal cancer (H)      Endometriosis, site unspecified      Thyroiditis, unspecified     Thryoiditis-resolved            Past Surgical History:     Past Surgical History:   Procedure Laterality Date     APPENDECTOMY      as a child     ARTHROPLASTY HIP Right 7/26/2018    Procedure: ARTHROPLASTY HIP;  Right Hip Hemiarthroplasty;  Surgeon: Zaire Phan MD;  [facility]: [facility]     COLONOSCOPY N/A 4/13/2017    Procedure: COLONOSCOPY;  Surgeon: Juan Dos Santos MD;  Location: [facility]     INSERT PORT VASCULAR ACCESS Right 2/8/2018    Procedure: INSERT PORT VASCULAR ACCESS;  Place Single Lumen Venous Chest Port Placement;  Surgeon: Zaire Moreira PA-C;  [facility]     SURGICAL HISTORY OF -       endometriosis            Social History:     Social History  "    Tobacco Use     Smoking status: Never Smoker     Smokeless tobacco: Never Used   Substance Use Topics     Alcohol use: No            Family History:     Family History   Problem Relation Age of Onset     Cancer Sister      Cancer Maternal Grandmother         lymphoma     Heart Disease Father         MI     Uterine Cancer Niece             Allergies:     Allergies   Allergen Reactions     Darvon [Propoxyphene]      Had reaction in teen years     Penicillins      As a child     Ketoconazole Rash            Medications:     Current Outpatient Medications   Medication     acetaminophen (TYLENOL) 500 MG tablet     calcium-vitamin D (CALTRATE) 600-400 MG-UNIT per tablet     cholecalciferol (VITAMIN D3) 5000 units (125 mcg) capsule     oxyCODONE (ROXICODONE) 5 MG tablet     polyethylene glycol (MIRALAX/GLYCOLAX) Packet     azithromycin (ZITHROMAX) 250 MG tablet     bisacodyl (DULCOLAX) 10 MG Suppository     docusate sodium (COLACE) 100 MG capsule     ferrous sulfate (IRON) 325 (65 Fe) MG tablet     Lidocaine (LIDOCARE) 4 % Patch     simethicone (MYLICON) 40 MG/0.6ML suspension     vitamin B complex with vitamin C (VITAMIN  B COMPLEX) TABS tablet     VITAMIN E PO     No current facility-administered medications for this visit.              Review of Systems:   A focused musculoskeletal and neurologic ROS was performed with pertinent positives and negatives noted in the HPI.  Additional systems were also reviewed and are documented at the bottom of the note.         Physical Exam:   Vitals: Ht 1.626 m (5' 4\")   Wt 56.2 kg (124 lb)   LMP  (LMP Unknown)   BMI 21.28 kg/m     Musculoskeletal, Neurologic, and Spine:   Neurologically intact superficial peroneal deep peroneal and tibial nerve distributions.  Right hip is examined, incision healed, no pain with hip log roll,         Imaging:   We ordered and independently reviewed new radiographs at this clinic visit. The results were discussed with the patient. Findings " include:     AP and lateral as well as inlet outlet pelvic views show healing left pubic rami fractures and right hip hemiarthroplasty without complication     Assessment and Plan:     78 year old female progressing as expected status post right hip hemiarthroplasty.  She had a recent bone density study which showed a radius T score of -9 which is dramatically low.  I have ordered an endocrinology referral for bone health.  She is going to continue vitamin D and calcium.  Follow-up in 6 months with repeat pelvic and right hip radiographs at that time     Respectfully,  Zaire Phan MD  Spine Surgery  [facility]

## 2019-01-25 NOTE — NURSING NOTE
"Reason For Visit:   Chief Complaint   Patient presents with     Surgical Followup     Right hip hemiarthroplasty patient having pain        Ht 1.626 m (5' 4\")   Wt 56.2 kg (124 lb)   LMP  (LMP Unknown)   BMI 21.28 kg/m      Pain Assessment  Patient Currently in Pain: Yes  Primary Pain Location: Hip(right )  Pain Descriptors: (have been having soreness from overdoing the exercises)    Doron Paz ATC  "

## 2019-01-28 ENCOUNTER — PATIENT OUTREACH (OUTPATIENT)
Dept: CARE COORDINATION | Facility: CLINIC | Age: 79
End: 2019-01-28

## 2019-01-28 NOTE — PROGRESS NOTES
Clinic Care Coordination Contact  [facility]/Voicemail       Clinical Data: Care Coordinator Outreach    Patient was hospitalized at [facility] 11/1-11/5/2018- with diagnosis of   Discharge Diagnoses      1. S/p ground level fall 2 weeks prior   2. Traumatic right femoral neck fracture s/p right hemiarthroplasty 7/26/18  3. Acute traumatic pain       Other diagnoses:  1. Stage III anal cancer s/p Chemo and radiation   2. Limited mobility 2/2 # 1   3. Recent catheter related wound due to perianal radiation   4. Hx of subacute right sided rib fractures 8-10 (dx 3/18)  5. Peripheral edema   6.   Outreach attempted x 1.  Home phone no longer working .     Left message on Katherine/friends voicemail with call back information and requested return call.    Plan:. Care Coordinator will try to reach patient again in 1-2 business days.    Quynh Mckeon RN / Clinical Care Coordinator     [facility]    [facility]   [email or web address]  Office :  [phone] / Fax :  [phone]

## 2019-01-28 NOTE — LETTER
[facility] COORDINATION  [address]  [place] [facility] [address]  January 29, 2019    Elizabeth Taylor  [address]  [place] [address]      Dear Elizabeth,    I am a clinic care coordinator who works with Baron Seth MD at [facility] . I wanted to thank your sister Sahil  for spending the time to talk with me.  I wanted to introduce myself and provide you with my contact information so that you can call me with questions or concerns about your health care. Below is a description of clinic care coordination and how I can further assist you.     The clinic care coordinator is a registered nurse and/or  who understand the health care system. The goal of clinic care coordination is to help you manage your health and improve access to the [facility] system in the most efficient manner. The registered nurse can assist you in meeting your health care goals by providing education, coordinating services, and strengthening the communication among your providers. The  can assist you with financial, behavioral, psychosocial, chemical dependency, counseling, and/or psychiatric resources.    Please feel free to contact me at [phone], with any questions or concerns. We at [facility] are focused on providing you with the highest-quality healthcare experience possible and that all starts with you.     Sincerely,     Quynh Mckeon RN / Clinical Care Coordinator     [facility]    [facility]   [email or web address]  Office :  [phone] / Fax :  [phone]      Enclosed: I have enclosed a copy of the Complex Care Plan. This has helpful information and goals that we have talked about. Please keep this in an easy to access place to use as needed.

## 2019-01-28 NOTE — LETTER
[facility]  Complex Care Plan  About Me  Patient Name:  Elizabeth Taylor    YOB: 1940  Age:     78 year old   [facility] MRN:   [id number] Telephone Information:  Home Phone [phone]   Mobile [phone]       Address:    [address] [facility] [address] Email address:  [email or web address]      Emergency Contact(s)  Name Relationship Lgl Grd Work Phone Home Phone Mobile Phone   1. AMARJIT CHAMBERS * Sister No  [phone]    2. ACE SANABRIA Friend No   [phone]   3. DILLON STEWART Friend No  [phone]            Primary language:  English     needed? No   [facility]:  [phone] op. 1  Other communication barriers:    Preferred Method of Communication:  Mail  Current living arrangement:    Mobility Status/ Medical Equipment:      Health Maintenance  Health Maintenance Reviewed:      My Access Plan  Medical Emergency 911   Primary Clinic Line [facility] - [phone]   24 Hour Appointment Line [phone] or  [phone]) (toll-free)   24 Hour Nurse Line [phone] (toll-free)   Preferred Urgent Care     Preferred Hospital     Preferred Pharmacy [facility] [place], [place] - [address]     Behavioral Health Crisis Line The National Suicide Prevention Lifeline at 1-905.139.5077 or 911     My Care Team Members    Patient Care Team       Relationship Specialty Notifications Start End    Baron Seth MD PCP - General Family Practice  7/25/18     Phone: [phone] Fax: [phone]         [address] [facility] [address]    Baron Seth MD PCP - Assigned PCP   9/30/18     Phone: [phone] Fax: [phone]         [address] [facility] [address]    Shen Ray MD MD Hematology & Oncology Admissions 2/2/18     Phone: [phone] Fax: [phone]         [address] [place] [address]    Pauline Aguayo RN Nurse Coordinator Hematology  & Oncology Admissions 2/12/18     Phone: [phone] Pager: [phone]        Zaire Madison MD MD Radiation Oncology  4/17/18     Phone: [phone] Fax: [phone]         [address] [facility] [address] [facility] [address]    Quynh Mckeon, RN Clinic Care Coordinator Primary Care -   5/31/18     Phone: [phone] Fax: [phone]        Emir Rosas MD MD Colon and Rectal Surgery  9/5/18     Phone: [phone] Fax: [phone]         [address] [facility] [address] [facility] [address]    Zaire Phan MD MD Orthopedics  9/6/18     Phone: [phone] Fax: [phone]         [address] [facility] [address]    [facility] ([facility] ([place]  1/23/19     Phone: [phone]                 My Care Plans  Self Management and Treatment Plan  Goals and (Comments)  Goals        Lifestyle    Increase physical activity     Notes - Note created  1/29/2019 10:16 AM by Quynh Mckeon RN    Goal Statement: I will increase my strength  Measure of Success: Increased mobility with daily activities   Supportive Steps to Achieve: I will do daily home PT exercises and us walker for safety when needed   Barriers: Deconditioned   Strengths: Supportive sister   Date to Achieve By: 2/28/2019  Patient expressed understanding of goal: Yes                Action Plans on File: None                       Advance Care Plans/Directives Type: None       My Medical and Care Information  Problem List   Patient Active Problem List   Diagnosis     Dry eye syndrome     Malignant neoplasm of anal canal (H)     Diarrhea     Femoral neck fracture (H)     Closed fracture of neck of right femur, initial encounter (H)     Hip fracture (H)     History of total hip replacement, unspecified laterality      Current Medications and Allergies:  See printed Medication Report.    Care Coordination Start Date: 1/29/2019   Frequency of Care Coordination:  1-2 weeks    Form Last  Updated: 01/29/2019

## 2019-01-29 NOTE — PROGRESS NOTES
Return call from the patient stating her legs are not even since her hip replacement and so her pelvis is off a bit with daily activities.  Patient states she is away's adjusting her body .  Patient is doing well walking independently  in small spaces where she can hang on to something .  Patient otherwise uses a walker in wide spaces .  Patient states her sister has a ballroom in the basement and so she has a lot of room to walk and is also doing her daily PT exercises   Patient agrees to a follow up call after her PCP visit 2//7/2019    Quynh Mckeon RN / Clinical Care Coordinator     [facility]   [email or web address]  Office :  [phone] / Fax :  [phone]

## 2019-01-29 NOTE — PROGRESS NOTES
Late entry from 1/28/2019--Incoming call from  Heather dejesus  Left a message to call her back at [phone]      Clinic Care Coordination Contact  [facility]/Voicemail       Clinical Data: Care Coordinator Outreach  Outreach attempted x 2.  Left message on voicemail with call back information and requested return call.  Plan: Care Coordinator will await a return call from Katherine/ Michelle Mckeon RN / Clinical Care Coordinator     [facility]   [email or web address]  Office :  [phone] / Fax :  [phone]

## 2019-02-07 ENCOUNTER — PATIENT OUTREACH (OUTPATIENT)
Dept: CARE COORDINATION | Facility: CLINIC | Age: 79
End: 2019-02-07

## 2019-02-07 ENCOUNTER — OFFICE VISIT (OUTPATIENT)
Dept: FAMILY MEDICINE | Facility: CLINIC | Age: 79
End: 2019-02-07
Payer: MEDICARE

## 2019-02-07 VITALS
RESPIRATION RATE: 16 BRPM | HEART RATE: 71 BPM | BODY MASS INDEX: 20.64 KG/M2 | SYSTOLIC BLOOD PRESSURE: 153 MMHG | WEIGHT: 120.25 LBS | DIASTOLIC BLOOD PRESSURE: 73 MMHG | OXYGEN SATURATION: 99 % | TEMPERATURE: 97.4 F

## 2019-02-07 DIAGNOSIS — S32.9XXS: ICD-10-CM

## 2019-02-07 DIAGNOSIS — M81.0 OSTEOPOROSIS, UNSPECIFIED OSTEOPOROSIS TYPE, UNSPECIFIED PATHOLOGICAL FRACTURE PRESENCE: ICD-10-CM

## 2019-02-07 DIAGNOSIS — S72.009A FEMORAL NECK FRACTURE (H): Primary | ICD-10-CM

## 2019-02-07 DIAGNOSIS — D50.9 IRON DEFICIENCY ANEMIA, UNSPECIFIED IRON DEFICIENCY ANEMIA TYPE: Primary | ICD-10-CM

## 2019-02-07 DIAGNOSIS — M53.3 SACROILIAC JOINT PAIN: ICD-10-CM

## 2019-02-07 PROCEDURE — 99214 OFFICE O/P EST MOD 30 MIN: CPT | Performed by: FAMILY MEDICINE

## 2019-02-07 NOTE — PROGRESS NOTES
SUBJECTIVE:   Elizabeth Taylor is a 78 year old female who presents to clinic today for the following health issues:    Hip pain-she reports her hip pain to be improving slowly as she has become more active.  She continues to get a cramping type sensation anterior thighs bilaterally for which she takes oxycodone 2.5 mg 2 times during the evening/night time.  She does not have this discomfort when she is walking around or when she is sitting.  Seems to have its onset when she lays down in the evening time.    She had a fall within the past 3 weeks where she wrenched her back a little bit.  She has some pain on the right side as result of this.  No numbness or tingling.  Overall this pain is improving.    She has noted that her right side leg seems to be shorter than her left side leg.  She wonders if this may increase her balance problems.    Her DEXA scan results included a T score of -9..  She has follow-up with endocrinology in approximately 1-2 weeks.  In the past she has been resistant to using medications for osteoporosis.  We reviewed vitamin D and calcium supplements.  She is not taking these at present and refuses to do so until she talks to endocrinology    She has a history of anemia and she is due to get blood counts and iron rechecked.  We will hold off on this until she sees endocrinology hopefully getting off blood drawn that day.    med hx, family hx, and soc hx reviewed and updated     She is living with her sister who is helping care for her.    OBJECTIVE: /73   Pulse 71   Temp 97.4  F (36.3  C) (Oral)   Resp 16   Wt 54.5 kg (120 lb 4 oz)   LMP  (LMP Unknown)   SpO2 99%   Breastfeeding? No   BMI 20.64 kg/m     Lumbosacral spine area reveals no local tenderness or mass over the vetebral bodies. She is tender over the sacroiliac joint on the right. bilateral legs measure equally. Xray and exam shows the pelvis to b tiled and there to be scoliotic change.   Painful and reduced LS ROM  noted. DTR's, motor strength and sensation normal.         ICD-10-CM    1. Iron deficiency anemia, unspecified iron deficiency anemia type D50.9 Ferritin     **CBC with platelets FUTURE 14d   2. Sacroiliac joint pain M53.3    3. Closed fracture of left pelvis, sequela S32.9XXS    4. Osteoporosis, unspecified osteoporosis type, unspecified pathological fracture presence M81.0    Discussed recommendations for vitamin D and calcium.  Discussed some potential medicines prescribed for osteoporosis.  Given the severity of her osteoporosis secondary osteoporosis.  Her nutritional status is borderline.  Most recent albumin was 3.3 her weight has been stable over the past number of months.  We discussed treatment for her frequent falls sacroiliac joint pain.  She will taper off the oxycodone over the coming 2-4 weeks.  Discussed conservative management of this pain.  She should consider physical therapy for the sacroiliac pain.  If it is persisting we could also x-ray of this area given the degree of her osteoporosis.

## 2019-02-07 NOTE — PROGRESS NOTES
Clinic Care Coordination Contact  [facility]/Voicemail    Referral Source: SNF/TCU    Patient was hospitalized at [facility] 11/1-11/5/2018- with diagnosis of   Discharge Diagnoses      1. S/p ground level fall 2 weeks prior   2. Traumatic right femoral neck fracture s/p right hemiarthroplasty 7/26/18  3. Acute traumatic pain       Other diagnoses:  1. Stage III anal cancer s/p Chemo and radiation   2. Limited mobility 2/2 # 1   3. Recent catheter related wound due to perianal radiation   4. Hx of subacute right sided rib fractures 8-10 (dx 3/18)  5. Peripheral edema   C. Diff diarrhea   Incoming call from the patient .  Left a message she would like to meet face to face at PCP visit today     Clinical Data: Care Coordinator Outreach  Outreach attempted x 1.  Left message on rSmart CC is not at the [facility] and will call and get an update next week     Plan: Care Coordinator will await a return call     Quynh Mckeon RN / Clinical Care Coordinator     [facility]   [email or web address]  Office :  [phone] / Fax :  [phone]

## 2019-02-07 NOTE — PATIENT INSTRUCTIONS
Keep on stretching the anterior thigh when you feel spasms. Try heat and tylenol for this as well.

## 2019-02-11 NOTE — TELEPHONE ENCOUNTER
RECORDS RECEIVED FROM: internal   DATE RECEIVED: 2/11   NOTES STATUS DETAILS   OFFICE NOTE from referring provider Internal    OFFICE NOTE from other specialist Internal    DISCHARGE SUMMARY from hospital Internal    DISCHARGE REPORT from the ER Internal    OPERATIVE REPORT Internal    MEDICATION LIST Internal    IMPLANT RECORD/STICKER n/a    LABS     CBC/DIFF Internal    CULTURES N/A    INJECTIONS DONE IN RADIOLOGY N/A    MRI Internal    CT SCAN Internal    XRAYS (IMAGES & REPORTS) Internal    TUMOR     PATHOLOGY  Slides & report N/A     bone health

## 2019-02-18 ENCOUNTER — PATIENT OUTREACH (OUTPATIENT)
Dept: CARE COORDINATION | Facility: CLINIC | Age: 79
End: 2019-02-18

## 2019-02-18 NOTE — PROGRESS NOTES
"Clinic Care Coordination Contact      Incoming call from the patient stating she had a catheter previously  and it was kept in too long causeing an infection.  Patient was instructed to manipulate the \"ring\" to keep open for inspection.  Patient states in the last few days the area around opening is \"growing.     Patient instructed to make an appointment in the next few days.  Patient agrees with the plan.  Patient needs to make arrangements with her sister for transportation.  Patient transferred to the appointment line .   CC will mail a [employer]  application  form to the patient today   CC will follow up in 3-5 business days     Quynh Mckeon RN / Clinical Care Coordinator     [facility]   [email or web address]  Office :  [phone] / Fax :  [phone]    "

## 2019-02-22 ENCOUNTER — ANCILLARY PROCEDURE (OUTPATIENT)
Dept: CT IMAGING | Facility: CLINIC | Age: 79
End: 2019-02-22
Attending: INTERNAL MEDICINE
Payer: MEDICARE

## 2019-02-22 ENCOUNTER — APPOINTMENT (OUTPATIENT)
Dept: LAB | Facility: CLINIC | Age: 79
End: 2019-02-22
Attending: INTERNAL MEDICINE
Payer: MEDICARE

## 2019-02-22 ENCOUNTER — ONCOLOGY VISIT (OUTPATIENT)
Dept: ONCOLOGY | Facility: CLINIC | Age: 79
End: 2019-02-22
Attending: INTERNAL MEDICINE
Payer: MEDICARE

## 2019-02-22 ENCOUNTER — OFFICE VISIT (OUTPATIENT)
Dept: RADIATION ONCOLOGY | Facility: CLINIC | Age: 79
End: 2019-02-22
Attending: RADIOLOGY
Payer: MEDICARE

## 2019-02-22 ENCOUNTER — PRE VISIT (OUTPATIENT)
Dept: ORTHOPEDICS | Facility: CLINIC | Age: 79
End: 2019-02-22

## 2019-02-22 VITALS
DIASTOLIC BLOOD PRESSURE: 79 MMHG | RESPIRATION RATE: 16 BRPM | TEMPERATURE: 97.8 F | WEIGHT: 123 LBS | HEART RATE: 69 BPM | OXYGEN SATURATION: 97 % | SYSTOLIC BLOOD PRESSURE: 133 MMHG | BODY MASS INDEX: 21.11 KG/M2

## 2019-02-22 VITALS — DIASTOLIC BLOOD PRESSURE: 78 MMHG | WEIGHT: 122 LBS | BODY MASS INDEX: 20.94 KG/M2 | SYSTOLIC BLOOD PRESSURE: 130 MMHG

## 2019-02-22 DIAGNOSIS — C21.1 MALIGNANT NEOPLASM OF ANAL CANAL (H): ICD-10-CM

## 2019-02-22 DIAGNOSIS — D50.9 IRON DEFICIENCY ANEMIA, UNSPECIFIED IRON DEFICIENCY ANEMIA TYPE: ICD-10-CM

## 2019-02-22 DIAGNOSIS — C21.1 MALIGNANT NEOPLASM OF ANAL CANAL (H): Primary | ICD-10-CM

## 2019-02-22 LAB
ALBUMIN SERPL-MCNC: 3.3 G/DL (ref 3.4–5)
ALP SERPL-CCNC: 142 U/L (ref 40–150)
ALT SERPL W P-5'-P-CCNC: 22 U/L (ref 0–50)
ANION GAP SERPL CALCULATED.3IONS-SCNC: 4 MMOL/L (ref 3–14)
AST SERPL W P-5'-P-CCNC: 18 U/L (ref 0–45)
BASOPHILS # BLD AUTO: 0 10E9/L (ref 0–0.2)
BASOPHILS NFR BLD AUTO: 0.5 %
BILIRUB SERPL-MCNC: 0.9 MG/DL (ref 0.2–1.3)
BUN SERPL-MCNC: 19 MG/DL (ref 7–30)
CALCIUM SERPL-MCNC: 8.9 MG/DL (ref 8.5–10.1)
CHLORIDE SERPL-SCNC: 104 MMOL/L (ref 94–109)
CO2 SERPL-SCNC: 29 MMOL/L (ref 20–32)
CREAT SERPL-MCNC: 0.51 MG/DL (ref 0.52–1.04)
DIFFERENTIAL METHOD BLD: ABNORMAL
EOSINOPHIL # BLD AUTO: 0.1 10E9/L (ref 0–0.7)
EOSINOPHIL NFR BLD AUTO: 1.9 %
ERYTHROCYTE [DISTWIDTH] IN BLOOD BY AUTOMATED COUNT: 14.5 % (ref 10–15)
FERRITIN SERPL-MCNC: 36 NG/ML (ref 8–252)
GFR SERPL CREATININE-BSD FRML MDRD: >90 ML/MIN/{1.73_M2}
GLUCOSE SERPL-MCNC: 76 MG/DL (ref 70–99)
HCT VFR BLD AUTO: 40.6 % (ref 35–47)
HGB BLD-MCNC: 12.4 G/DL (ref 11.7–15.7)
IMM GRANULOCYTES # BLD: 0 10E9/L (ref 0–0.4)
IMM GRANULOCYTES NFR BLD: 0.3 %
LYMPHOCYTES # BLD AUTO: 1.2 10E9/L (ref 0.8–5.3)
LYMPHOCYTES NFR BLD AUTO: 19.1 %
MCH RBC QN AUTO: 28.1 PG (ref 26.5–33)
MCHC RBC AUTO-ENTMCNC: 30.5 G/DL (ref 31.5–36.5)
MCV RBC AUTO: 92 FL (ref 78–100)
MONOCYTES # BLD AUTO: 0.7 10E9/L (ref 0–1.3)
MONOCYTES NFR BLD AUTO: 10.1 %
NEUTROPHILS # BLD AUTO: 4.4 10E9/L (ref 1.6–8.3)
NEUTROPHILS NFR BLD AUTO: 68.1 %
NRBC # BLD AUTO: 0 10*3/UL
NRBC BLD AUTO-RTO: 0 /100
PLATELET # BLD AUTO: 231 10E9/L (ref 150–450)
POTASSIUM SERPL-SCNC: 4.4 MMOL/L (ref 3.4–5.3)
PROT SERPL-MCNC: 6.9 G/DL (ref 6.8–8.8)
RBC # BLD AUTO: 4.41 10E12/L (ref 3.8–5.2)
SODIUM SERPL-SCNC: 138 MMOL/L (ref 133–144)
WBC # BLD AUTO: 6.4 10E9/L (ref 4–11)

## 2019-02-22 PROCEDURE — 99214 OFFICE O/P EST MOD 30 MIN: CPT | Mod: ZP | Performed by: INTERNAL MEDICINE

## 2019-02-22 PROCEDURE — 25000128 H RX IP 250 OP 636: Mod: ZF | Performed by: INTERNAL MEDICINE

## 2019-02-22 PROCEDURE — 85025 COMPLETE CBC W/AUTO DIFF WBC: CPT | Performed by: FAMILY MEDICINE

## 2019-02-22 PROCEDURE — G0463 HOSPITAL OUTPT CLINIC VISIT: HCPCS | Mod: ZF

## 2019-02-22 PROCEDURE — 80053 COMPREHEN METABOLIC PANEL: CPT | Performed by: FAMILY MEDICINE

## 2019-02-22 PROCEDURE — G0463 HOSPITAL OUTPT CLINIC VISIT: HCPCS | Performed by: RADIOLOGY

## 2019-02-22 PROCEDURE — 82728 ASSAY OF FERRITIN: CPT | Performed by: FAMILY MEDICINE

## 2019-02-22 PROCEDURE — 36591 DRAW BLOOD OFF VENOUS DEVICE: CPT

## 2019-02-22 RX ORDER — IOPAMIDOL 755 MG/ML
76 INJECTION, SOLUTION INTRAVASCULAR ONCE
Status: COMPLETED | OUTPATIENT
Start: 2019-02-22 | End: 2019-02-22

## 2019-02-22 RX ORDER — HEPARIN SODIUM (PORCINE) LOCK FLUSH IV SOLN 100 UNIT/ML 100 UNIT/ML
5 SOLUTION INTRAVENOUS EVERY 8 HOURS
Status: DISCONTINUED | OUTPATIENT
Start: 2019-02-22 | End: 2019-03-02 | Stop reason: HOSPADM

## 2019-02-22 RX ORDER — HEPARIN SODIUM (PORCINE) LOCK FLUSH IV SOLN 100 UNIT/ML 100 UNIT/ML
5 SOLUTION INTRAVENOUS ONCE
Status: COMPLETED | OUTPATIENT
Start: 2019-02-22 | End: 2019-02-22

## 2019-02-22 RX ADMIN — HEPARIN 5 ML: 100 SYRINGE at 09:27

## 2019-02-22 RX ADMIN — IOPAMIDOL 76 ML: 755 INJECTION, SOLUTION INTRAVASCULAR at 10:01

## 2019-02-22 RX ADMIN — HEPARIN SODIUM (PORCINE) LOCK FLUSH IV SOLN 100 UNIT/ML 5 ML: 100 SOLUTION at 10:17

## 2019-02-22 ASSESSMENT — PAIN SCALES - GENERAL: PAINLEVEL: NO PAIN (0)

## 2019-02-22 NOTE — LETTER
2019      RE: Elizabeth Taylor  [address]  [facility] [address]       FOLLOW-UP VISIT    Patient Name: Elizabeth Taylor      : 1940     Age: 78 year old        ______________________________________________________________________________     Chief Complaint   Patient presents with     Cancer     Pt is here for a follow up:Anal 5400 cGy completed on 3/23/18     /78   Wt 55.3 kg (122 lb)   LMP  (LMP Unknown)   BMI 20.94 kg/m        Pain  Denies    Labs  Other Labs: Yes: today    Imaging  CT: today    Other Appointments:     MD Name: Dr Ray  Appointment Date: today   MD Name: Appointment Date:   MD Name: Appointment Date:   Other Appointment Notes:     Residual Radiation side effect: Small bump in vaginal area     Additional Instructions:     Nurse face-to-face time: Level 3:  10 min face to face time         Department of Radiation Oncology  [facility]  [address] [place] [address] [phone]       Radiation Oncology Follow-up Visit  2019      Elizabeth Taylor  MRN: [id number]   : 1940     DISEASE TREATED:   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 Delivered from 2018 - 3/23/2018.    SYSTEMIC THERAPY:  Concurrent mitomycin-C and 5-FU    INTERVAL SINCE COMPLETION OF RADIATION THERAPY:   11 months    SUBJECTIVE:   Elizabeth Taylor is a 78 year old female with a PMH significant for a locally advanced squamous cell carcinoma of the anal canal diagnosed after she presented with progressive anal pain and small amounts of bright red blood per rectum. Staging evaluation revealed a 2.5 cm mass located approximately 1 cm from the anal verge with involvement of the internal anal sphincter in addition to two FDG-avid left inguinal nodes.    Ms.  Claudia underwent treatment with definitive chemoradiotherapy with curative intent. The primary tumor was treated to a total dose of 54 Gy while the node-positive left groin received 50.4 Gy and the right groin and pelvis were treated to 45 Gy in 30 daily fractions using a simultaneous integrated boost technique. Radiotherapy was delivered with concurrent mitomycin-C and infusional 5-FU with mitomycin held during the second cycle of therapy due to the concern for treatment-related toxicities given her age and borderline functional status. Ms. Taylor tolerated the initiation of therapy relatively well though approximately midway through her treatment course did develop worsening diarrhea, eventually requiring inpatient admission during the final week of treatment.    Following completion of chemoradiotherapy, Ms. Taylor's post treatment course was complicated by a mechanical fall with a resultant right femoral neck fracture requiring a right hip and knee hemiarthroplasty on 7/26/2018. She was additionally hospitalized from 11/1/2018 - 11/5/2018 after she presented to the ED with new-onset left hip pain and was found to have a pelvic ring fracture. She has been followed closely by Dr. Zaire Phan of orthopedic surgery and has been managed with conservative cares. She was scheduled to see endocrinology due to her severe osteoporosis however she missed this appointment and is currently attempting to reschedule.    With regards to her prior anal cancer, Ms. Taylor reports that she continues to slowly but progressively recover from her treatment-related toxicities. She denies any incontinence of the bowel or bladder however she does have persistent bowel urgency following meals. She also denies any pain with either bowel movements or urination nor does she report any bleeding per rectum. She reports compliance with her vaginal dilator and states that she is using a small size routinely and is currently working up to  the medium-sized dilator. Her remaining ROS are otherwise mostly unchanged since her previous follow-up visit.     Ms. Taylor was seen by Dr. Ray for routine follow-up earlier today with a CT of the abdomen/pelvis revealing a new endplate compression deformity at L3 as well as a chronic nondisplaced fracture of the left superior pubic ramus and displaced fracture of the left inferior pubic ramus without evidence of locally recurrent or metastatic disease.    PHYSICAL EXAM:  Weight: 55.3 kg  BP: 130/78    Gen: Alert, in NAD  Pulm: No wheezing, stridor or respiratory distress  CV: Well-perfused, no cyanosis  Rectal: Marked hypo-and hyperpigmentation surrounding the anal verge. No desquamation or ulceration. Normal sphincter tone. No palpable masses within the anal verge/canal.  Musculoskeletal: Diffusely diminished muscle bulk with normal tone  Skin: Cutaneous changes surrounding the anus and bilateral groins, otherwise normal color and turgor    LABS AND IMAGING:  Reviewed.    IMPRESSION:   Ms. Taylor is a 78 year old female with a previous cT2 N1a M0 squamous cell carcinoma of the anal canal status post definitive chemoradiotherapy. Her post-treatment course has been complicated by a right femoral neck fracture and left pelvic fracture due in large part to her long-standing osteoporosis and pelvic radiotherapy. She has no evidence of disease on examination today.    PLAN:   1. Follow-up in radiation oncology clinic with NP in 3 months and with me in 6 months  2. Continue follow-up with Dr. Rosas of colorectal surgery and Dr. Ray of medical oncology for ongoing disease surveillance  3. Continue follow-up with Dr. Phan of orthopedic surgery as scheduled for ongoing evaluation of left pelvic fracture  4. Recommended rescheduling missed endocrinology at her earliest convenience for further workup and treatment of her severe osteoporosis  5. Continue daily vaginal dilator use    Zaire Madison  MD/PhD    Department of Radiation Oncology  [facility]

## 2019-02-22 NOTE — DISCHARGE INSTRUCTIONS

## 2019-02-22 NOTE — LETTER
2/22/2019       RE: Elizabeth Taylor  [address]  [place] [facility] [address]     Dear Colleague,    Thank you for referring your patient, Elizabeth Taylor, to the [facility]. Please see a copy of my visit note below.      FOLLOW-UP VISIT NOTE    PATIENT NAME: Elizabeth Taylor MRN # [id number]  DATE OF VISIT: Feb 22, 2019 YOB: 1940    REFERRING PROVIDER: Emir Rosas MD  [address]  [place] [address]    CANCER TYPE: Squamous cell carcinoma Anal canal  STAGE: T2N1- IIIA  ECOG PS: 1    ONCOLOGY HISTORY:  77-year-old female who developed bright red blood per rectum started about 4 years ago and progressively got worse. She underwent a colonoscopy in April 2017 which noted to have small anal fissure along with internal hemorrhoids. Symptoms continued and  she was referred to colorectal surgery for further evaluation. On exam she was found to have an 1.5 cm anal lesion on the left side along with left groin palpable adenopathy. Biopsy of anal mass came back positive for squamous cell carcinoma. Patient underwent staging workup with MRI pelvis and a PET scan- showed PET avid left anal canal mass along with small left inguinal FDG avid lymph nodes.     02/12/18- Started on concurrent chemoradiation with 5FU/Mitomycin    03/13/18 Skipped Mitomycin and proceeded with 5FU at reduced dose given neutropenia/old age    03/19/18 -04/04/18 admitted to the [facility] [place] [place] with intractable diarrhea, nausea, generalized weakness and fall at home resulting in multiple right rib fractures. Hospital course was complicated by small bowel obstruction for which patient was started on TPN. Bowel obstruction was managed conservatively eventually improved prior to discharge . 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 She was eventually discharged to transitional care unit.    05/25/18 05/25/18 MRI pelvis  and PET scan done showed near complete resolution of patient's known anal lesion with no evidence for recurrence or metastatic disease.    07/25/18-07/30/18 Admitted to the hospital with traumatic right femoral neck fracture secondary to fall. She underwent right hemiarthroplasty and was discharged to transitional care facility. Also noted to have a C. difficile infection and was started on oral vancomycin course.    11/1/18-11/5/18 admitted to hospital with a fall and left hip pain found to have pelvic ring fracture. Treated conservatively with pain control, PT/OT, and recommendations for PCP follow-up with DEXA scan and osteoporosis work-up.       SUBJECTIVE      She is in clinic today for 3 MONTH follow-up and discuss CT scans. She is accompanied by her friend. States that her energy levels and appetite are slowly improving. Complains of intermittent right hip pain. Also has had issues with vaginal stenosis and is scheduled to see OB/Gyn soon. Denies abdominal pain, diarrhea blood in the stools, fever/chills, nausea/vomiting or any other complaints      PAST MEDICAL HISTORY     Past Medical History:   Diagnosis Date     Anal cancer (H)      Endometriosis, site unspecified      Thyroiditis, unspecified     Thryoiditis-resolved         CURRENT OUTPATIENT MEDICATIONS     Current Outpatient Medications   Medication Sig Dispense Refill     acetaminophen (TYLENOL) 500 MG tablet Take 1,000 mg by mouth 3 times daily       Calcium-Magnesium-Zinc 333-133-5 MG TABS per tablet Take 1 tablet by mouth daily       cholecalciferol (VITAMIN D3) 5000 units (125 mcg) capsule 1 CAP BY MOUTH DAILY (DX: OSTEOPOROSIS) 90 capsule 3     docusate sodium (COLACE) 100 MG capsule Take 1 capsule (100 mg) by mouth 2 times daily 60 capsule      vitamin B complex with vitamin C (VITAMIN  B COMPLEX) TABS tablet Take 1 tablet by mouth daily       VITAMIN E PO Take 1 capsule by mouth daily       azithromycin (ZITHROMAX) 250 MG tablet Use 2 pills  about 1 hour before dental procedures. (Patient not taking: Reported on 1/25/2019) 2 tablet 3     oxyCODONE (ROXICODONE) 5 MG tablet Take 0.5 tablets (2.5 mg) by mouth every 4 hours as needed for moderate to severe pain (Patient not taking: Reported on 2/22/2019) 30 tablet 0        ALLERGIES     Allergies   Allergen Reactions     Darvon [Propoxyphene]      Had reaction in teen years     Penicillins      As a child     Ketoconazole Rash        REVIEW OF SYSTEMS   As above in the HPI, o/w complete 12-point ROS was negative.     PHYSICAL EXAM   B/P: 131/82, T: 98.5, P: 78, R: 16  SpO2 Readings from Last 4 Encounters:   08/31/18 96%   08/31/18 96%   07/30/18 97%   07/05/18 96%     Wt Readings from Last 3 Encounters:   02/22/19 55.3 kg (122 lb)   02/22/19 55.8 kg (123 lb)   02/07/19 54.5 kg (120 lb 4 oz)     GEN: alert. No distress  Mouth/ENT: Oropharynx is clear.   LUNGS: clear  CV: regular  ABDOMEN: soft, non-tender, non-distended, no palpable inguinal adenopathy  EXT: warm, well perfused  NEURO: alert     LABORATORY AND IMAGING STUDIES     Lab Results   Component Value Date    WBC 6.4 02/22/2019     Lab Results   Component Value Date    RBC 4.41 02/22/2019     Lab Results   Component Value Date    HGB 12.4 02/22/2019     Lab Results   Component Value Date    HCT 40.6 02/22/2019     No components found for: MCT  Lab Results   Component Value Date    MCV 92 02/22/2019     Lab Results   Component Value Date    MCH 28.1 02/22/2019     Lab Results   Component Value Date    MCHC 30.5 02/22/2019     Lab Results   Component Value Date    RDW 14.5 02/22/2019     Lab Results   Component Value Date     02/22/2019     Recent Labs   Lab Test 02/22/19  0918 11/26/18  1254    140   POTASSIUM 4.4 4.2   CHLORIDE 104 105   CO2 29 29   ANIONGAP 4 6   GLC 76 75   BUN 19 27   CR 0.51* 0.61   ELISE 8.9 8.8          ASSESSMENT AND PLAN     78-year-old female with no significant past medical history who presented with complaints  of bright red blood per rectum and was recently found to have an anal mass which on biopsy came back for positive for invasive squamous cell carcinoma. Staging workup showed a PET avid anal mass along with hypermetabolic left inguinal adenopathy.     - Squamous cell carcinoma Anal canal  T2N1- IIIA  Started on definitive chemoradiation with infusional FU 1000 mg/m2 on days 1 to 4 and 29 to 32, plus mitomycin 10 mg/m2 on days 1 and 29 (as tolerated), maximum 20 mg per dose- day 1 on 02/12/18  Day 29 03/13/18 Skipped mitomycin and proceeded with 5FU at reduced dose given neutropenia/old age.  Clinical exam negative for presence of anal last/inguinal adenopathy. Reviewed with her results of the recent CT scan which were negative for evidence of metastatic disease.  Currently on surveillance with  rectal exam with inguinal lymph node palpation every 3-6 months for 5 years along with Anoscopy every 6-12 months for 3 years. She has follow up scheduled with Surgery on 05/2019  Patient will also require a annual CT chest abdomen and pelvis with contrast for 3 years.   Missed surgery appointment due to recent hospital admission. Will ask schedulers to help arrange that.    - Recurrent falls/Fracture s/p repair  Energy levels gradually improving    - Osteoporosis  Ca/Vit D supplementation. Follow up with endocrinology    - Vaginal stenosis  Secondary to RT.Scheduled with Ob/Gyn in few weeks    - Normocytic anemia  resolved    RTC in 6 month with me for ov  Chart documentation with Dragon Voice recognition Software. Although reviewed after completion, some words and grammatical errors may remain.  Shen Ray MD  Attending Physician   Hematology/Medical Oncology

## 2019-02-22 NOTE — NURSING NOTE
"Oncology Rooming Note    February 22, 2019 12:38 PM   Elizabeth Taylor is a 78 year old female who presents for:    Chief Complaint   Patient presents with     Port Draw     accessed with power needle, heparin locked, vitals checked     Oncology Clinic Visit     Return; Anal CA     Initial Vitals: /79   Pulse 69   Temp 97.8  F (36.6  C) (Oral)   Resp 16   Wt 55.8 kg (123 lb)   LMP  (LMP Unknown)   SpO2 97%   BMI 21.11 kg/m   Estimated body mass index is 21.11 kg/m  as calculated from the following:    Height as of 1/25/19: 1.626 m (5' 4\").    Weight as of this encounter: 55.8 kg (123 lb). Body surface area is 1.59 meters squared.  No Pain (0) Comment: Data Unavailable   No LMP recorded (lmp unknown). Patient is postmenopausal.  Allergies reviewed: Yes  Medications reviewed: Yes    Medications: Medication refills not needed today.  Pharmacy name entered into [facility]:    [facility] - [place], [place] - [address]  [facility] [place], [place] - [address] [facility], [place] - [address] [place] [address]    Clinical concerns: Patient is concerned about a growth at the opening of her vagina, the noticed about 2 days ago. It does not bother her, but is concerned because it is new, started like a ring, about the size of a pea, and like a skin tag. Cory was notified.      Diya Obando MA              "

## 2019-02-22 NOTE — NURSING NOTE
Chief Complaint   Patient presents with     Port Draw     accessed with power needle, heparin locked, vitals checked     Rosanne Patterson RN on 2/22/2019 at 9:30 AM

## 2019-02-22 NOTE — PROGRESS NOTES
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 Delivered from 2018 - 3/23/2018.    SYSTEMIC THERAPY:  Concurrent mitomycin-C and 5-FU    INTERVAL SINCE COMPLETION OF RADIATION THERAPY:   11 months    SUBJECTIVE:   Elizabeth Taylor is a 78 year old female with a PMH significant for a locally advanced squamous cell carcinoma of the anal canal diagnosed after she presented with progressive anal pain and small amounts of bright red blood per rectum. Staging evaluation revealed a 2.5 cm mass located approximately 1 cm from the anal verge with involvement of the internal anal sphincter in addition to two FDG-avid left inguinal nodes.    Ms. Taylor underwent treatment with definitive chemoradiotherapy with curative intent. The primary tumor was treated to a total dose of 54 Gy while the node-positive left groin received 50.4 Gy and the right groin and pelvis were treated to 45 Gy in 30 daily fractions using a simultaneous integrated boost technique. Radiotherapy was delivered with concurrent mitomycin-C and infusional 5-FU with mitomycin held during the second cycle of therapy due to the concern for treatment-related toxicities given her age and borderline functional status. Ms. Taylor tolerated the initiation of therapy relatively well though approximately midway through her treatment course did develop worsening diarrhea, eventually requiring inpatient admission during the final week of treatment.    Following completion of chemoradiotherapy, Ms. Taylor's post treatment course was complicated  by a mechanical fall with a resultant right femoral neck fracture requiring a right hip and knee hemiarthroplasty on 7/26/2018. She was additionally hospitalized from 11/1/2018 - 11/5/2018 after she presented to the ED with new-onset left hip pain and was found to have a pelvic ring fracture. She has been followed closely by Dr. Zaire Phan of orthopedic surgery and has been managed with conservative cares. She was scheduled to see endocrinology due to her severe osteoporosis however she missed this appointment and is currently attempting to reschedule.    With regards to her prior anal cancer, Ms. Taylor reports that she continues to slowly but progressively recover from her treatment-related toxicities. She denies any incontinence of the bowel or bladder however she does have persistent bowel urgency following meals. She also denies any pain with either bowel movements or urination nor does she report any bleeding per rectum. She reports compliance with her vaginal dilator and states that she is using a small size routinely and is currently working up to the medium-sized dilator. Her remaining ROS are otherwise mostly unchanged since her previous follow-up visit.     Ms. Taylor was seen by Dr. Ray for routine follow-up earlier today with a CT of the abdomen/pelvis revealing a new endplate compression deformity at L3 as well as a chronic nondisplaced fracture of the left superior pubic ramus and displaced fracture of the left inferior pubic ramus without evidence of locally recurrent or metastatic disease.    PHYSICAL EXAM:  Weight: 55.3 kg  BP: 130/78    Gen: Alert, in NAD  Pulm: No wheezing, stridor or respiratory distress  CV: Well-perfused, no cyanosis  Rectal: Marked hypo-and hyperpigmentation surrounding the anal verge. No desquamation or ulceration. Normal sphincter tone. No palpable masses within the anal verge/canal.  Musculoskeletal: Diffusely diminished muscle bulk with normal tone  Skin:  Cutaneous changes surrounding the anus and bilateral groins, otherwise normal color and turgor    LABS AND IMAGING:  Reviewed.    IMPRESSION:   Ms. Taylor is a 78 year old female with a previous cT2 N1a M0 squamous cell carcinoma of the anal canal status post definitive chemoradiotherapy. Her post-treatment course has been complicated by a right femoral neck fracture and left pelvic fracture due in large part to her long-standing osteoporosis and pelvic radiotherapy. She has no evidence of disease on examination today.    PLAN:   1. Follow-up in radiation oncology clinic with NP in 3 months and with me in 6 months  2. Continue follow-up with Dr. Rosas of colorectal surgery and Dr. Ray of medical oncology for ongoing disease surveillance  3. Continue follow-up with Dr. Phan of orthopedic surgery as scheduled for ongoing evaluation of left pelvic fracture  4. Recommended rescheduling missed endocrinology at her earliest convenience for further workup and treatment of her severe osteoporosis  5. Continue daily vaginal dilator use    Zaire Madison MD/PhD    Department of Radiation Oncology  [facility]

## 2019-02-22 NOTE — PROGRESS NOTES
FOLLOW-UP VISIT NOTE    PATIENT NAME: Elizabeth Taylor MRN # [id number]  DATE OF VISIT: Feb 22, 2019 YOB: 1940    REFERRING PROVIDER: Emir Rosas MD  [address] [place] [id number]    CANCER TYPE: Squamous cell carcinoma Anal canal  STAGE: T2N1- IIIA  ECOG PS: 1    ONCOLOGY HISTORY:  77-year-old female who developed bright red blood per rectum started about 4 years ago and progressively got worse. She underwent a colonoscopy in April 2017 which noted to have small anal fissure along with internal hemorrhoids. Symptoms continued and  she was referred to colorectal surgery for further evaluation. On exam she was found to have an 1.5 cm anal lesion on the left side along with left groin palpable adenopathy. Biopsy of anal mass came back positive for squamous cell carcinoma. Patient underwent staging workup with MRI pelvis and a PET scan- showed PET avid left anal canal mass along with small left inguinal FDG avid lymph nodes.     02/12/18- Started on concurrent chemoradiation with 5FU/Mitomycin    03/13/18 Skipped Mitomycin and proceeded with 5FU at reduced dose given neutropenia/old age    03/19/18 -04/04/18 admitted to the [facility] with intractable diarrhea, nausea, generalized weakness and fall at home resulting in multiple right rib fractures. Hospital course was complicated by small bowel obstruction for which patient was started on TPN. Bowel obstruction was managed conservatively eventually improved prior to discharge . 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 She was eventually discharged to transitional care unit.    05/25/18 05/25/18 MRI pelvis and PET scan done showed near complete resolution of patient's known anal lesion with no evidence for recurrence or metastatic disease.    07/25/18-07/30/18 Admitted to the hospital with traumatic right femoral neck fracture secondary to fall. She underwent  right hemiarthroplasty and was discharged to transitional care facility. Also noted to have a C. difficile infection and was started on oral vancomycin course.    11/1/18-11/5/18 admitted to hospital with a fall and left hip pain found to have pelvic ring fracture. Treated conservatively with pain control, PT/OT, and recommendations for PCP follow-up with DEXA scan and osteoporosis work-up.       SUBJECTIVE      She is in clinic today for 3 MONTH follow-up and discuss CT scans. She is accompanied by her friend. States that her energy levels and appetite are slowly improving. Complains of intermittent right hip pain. Also has had issues with vaginal stenosis and is scheduled to see OB/Gyn soon. Denies abdominal pain, diarrhea blood in the stools, fever/chills, nausea/vomiting or any other complaints      PAST MEDICAL HISTORY     Past Medical History:   Diagnosis Date     Anal cancer (H)      Endometriosis, site unspecified      Thyroiditis, unspecified     Thryoiditis-resolved         CURRENT OUTPATIENT MEDICATIONS     Current Outpatient Medications   Medication Sig Dispense Refill     acetaminophen (TYLENOL) 500 MG tablet Take 1,000 mg by mouth 3 times daily       Calcium-Magnesium-Zinc 333-133-5 MG TABS per tablet Take 1 tablet by mouth daily       cholecalciferol (VITAMIN D3) 5000 units (125 mcg) capsule 1 CAP BY MOUTH DAILY (DX: OSTEOPOROSIS) 90 capsule 3     docusate sodium (COLACE) 100 MG capsule Take 1 capsule (100 mg) by mouth 2 times daily 60 capsule      vitamin B complex with vitamin C (VITAMIN  B COMPLEX) TABS tablet Take 1 tablet by mouth daily       VITAMIN E PO Take 1 capsule by mouth daily       azithromycin (ZITHROMAX) 250 MG tablet Use 2 pills about 1 hour before dental procedures. (Patient not taking: Reported on 1/25/2019) 2 tablet 3     oxyCODONE (ROXICODONE) 5 MG tablet Take 0.5 tablets (2.5 mg) by mouth every 4 hours as needed for moderate to severe pain (Patient not taking: Reported on  2/22/2019) 30 tablet 0        ALLERGIES     Allergies   Allergen Reactions     Darvon [Propoxyphene]      Had reaction in teen years     Penicillins      As a child     Ketoconazole Rash        REVIEW OF SYSTEMS   As above in the HPI, o/w complete 12-point ROS was negative.     PHYSICAL EXAM   B/P: 131/82, T: 98.5, P: 78, R: 16  SpO2 Readings from Last 4 Encounters:   08/31/18 96%   08/31/18 96%   07/30/18 97%   07/05/18 96%     Wt Readings from Last 3 Encounters:   02/22/19 55.3 kg (122 lb)   02/22/19 55.8 kg (123 lb)   02/07/19 54.5 kg (120 lb 4 oz)     GEN: alert. No distress  Mouth/ENT: Oropharynx is clear.   LUNGS: clear  CV: regular  ABDOMEN: soft, non-tender, non-distended, no palpable inguinal adenopathy  EXT: warm, well perfused  NEURO: alert     LABORATORY AND IMAGING STUDIES     Lab Results   Component Value Date    WBC 6.4 02/22/2019     Lab Results   Component Value Date    RBC 4.41 02/22/2019     Lab Results   Component Value Date    HGB 12.4 02/22/2019     Lab Results   Component Value Date    HCT 40.6 02/22/2019     No components found for: MCT  Lab Results   Component Value Date    MCV 92 02/22/2019     Lab Results   Component Value Date    MCH 28.1 02/22/2019     Lab Results   Component Value Date    MCHC 30.5 02/22/2019     Lab Results   Component Value Date    RDW 14.5 02/22/2019     Lab Results   Component Value Date     02/22/2019     Recent Labs   Lab Test 02/22/19  0918 11/26/18  1254    140   POTASSIUM 4.4 4.2   CHLORIDE 104 105   CO2 29 29   ANIONGAP 4 6   GLC 76 75   BUN 19 27   CR 0.51* 0.61   ELISE 8.9 8.8          ASSESSMENT AND PLAN     78-year-old female with no significant past medical history who presented with complaints of bright red blood per rectum and was recently found to have an anal mass which on biopsy came back for positive for invasive squamous cell carcinoma. Staging workup showed a PET avid anal mass along with hypermetabolic left inguinal adenopathy.     -  Squamous cell carcinoma Anal canal  T2N1- IIIA  Started on definitive chemoradiation with infusional FU 1000 mg/m2 on days 1 to 4 and 29 to 32, plus mitomycin 10 mg/m2 on days 1 and 29 (as tolerated), maximum 20 mg per dose- day 1 on 02/12/18  Day 29 03/13/18 Skipped mitomycin and proceeded with 5FU at reduced dose given neutropenia/old age.  Clinical exam negative for presence of anal last/inguinal adenopathy. Reviewed with her results of the recent CT scan which were negative for evidence of metastatic disease.  Currently on surveillance with  rectal exam with inguinal lymph node palpation every 3-6 months for 5 years along with Anoscopy every 6-12 months for 3 years. She has follow up scheduled with Surgery on 05/2019  Patient will also require a annual CT chest abdomen and pelvis with contrast for 3 years.   Missed surgery appointment due to recent hospital admission. Will ask schedulers to help arrange that.    - Recurrent falls/Fracture s/p repair  Energy levels gradually improving    - Osteoporosis  Ca/Vit D supplementation. Follow up with endocrinology    - Vaginal stenosis  Secondary to RT.Scheduled with Ob/Gyn in few weeks    - Normocytic anemia  resolved    RTC in 6 month with me for ov  Chart documentation with Dragon Voice recognition Software. Although reviewed after completion, some words and grammatical errors may remain.  Shen Ray MD  Attending Physician   Hematology/Medical Oncology

## 2019-02-22 NOTE — PROGRESS NOTES
FOLLOW-UP VISIT    Patient Name: Elizabeth Taylor      : 1940     Age: 78 year old        ______________________________________________________________________________     Chief Complaint   Patient presents with     Cancer     Pt is here for a follow up:Anal 5400 cGy completed on 3/23/18     /78   Wt 55.3 kg (122 lb)   LMP  (LMP Unknown)   BMI 20.94 kg/m           Pain  Denies    Labs  Other Labs: Yes: today    Imaging  CT: today      Other Appointments:     MD Name: Dr Ray  Appointment Date: today   MD Name: Appointment Date:   MD Name: Appointment Date:   Other Appointment Notes:     Residual Radiation side effect: Small bump in vaginal area     Additional Instructions:     Nurse face-to-face time: Level 3:  10 min face to face time

## 2019-02-26 NOTE — PROGRESS NOTES
Patient has an appointment with Dr Lopez 3/4 CC will follow up after that appointment     Quynh Mckeon RN / Clinical Care Coordinator     [facility]   [email or web address]  Office :  [phone] / Fax :  [phone]

## 2019-03-04 ENCOUNTER — OFFICE VISIT (OUTPATIENT)
Dept: OBGYN | Facility: CLINIC | Age: 79
End: 2019-03-04
Payer: MEDICARE

## 2019-03-04 VITALS
BODY MASS INDEX: 20.43 KG/M2 | SYSTOLIC BLOOD PRESSURE: 133 MMHG | HEART RATE: 98 BPM | DIASTOLIC BLOOD PRESSURE: 78 MMHG | OXYGEN SATURATION: 95 % | WEIGHT: 119 LBS

## 2019-03-04 DIAGNOSIS — N90.89: Primary | ICD-10-CM

## 2019-03-04 PROCEDURE — 99202 OFFICE O/P NEW SF 15 MIN: CPT | Performed by: OBSTETRICS & GYNECOLOGY

## 2019-03-04 NOTE — PROGRESS NOTES
SUBJECTIVE:  Elizabeth Taylor is a 78 year old female who presents to evaluate possible vulvar abnormality.  She has been treated with radiation and chemotherapy for anal cancer, has had a complicated course which has included a C. difficile infection and prolonged use of the catheter.  She has had vulvar discomfort during this time, but now can palpate and see skin changes, including an abnormality that is causing her concern.  She has had no bleeding from the vulva and the discomfort seems to be lessening.  Her mobility is limited, she uses a walker, and reports that her gait is not very stable.  She was unable to safely remain on an examining table in the regular rooms, so we went to the procedure room.    OBJECTIVE: /78   Pulse 98   Wt 54 kg (119 lb)   LMP  (LMP Unknown)   SpO2 95%   BMI 20.43 kg/m        Examination of the vulva revealed marked atrophy and pale tissue.  No ulcerations were seen.  The labia minora were distorted, and what appeared to be the right labia minora appears enlarged, reddened, and slightly tender.  I was unable to clearly delineate the vaginal introitus.    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    PLAN: We discussed the findings on examination.  I would recommend that she seek consultation with one of the GYN/Oncology specialist at the [facility].  Referral is written.20    Please note greater than 50% of this 20 minute appointment were spent in counseling with the patient of the issues described above in the history of present illness and in the plan, including evaluation and managment of vulvar anatomic abnormality.

## 2019-03-06 NOTE — PROGRESS NOTES
Oncology/Hematology Survivorship Visit Note  Mar 7, 2019    Reason for Visit: Survivorship visit for anal cancer    History of Present Illness: Elizabeth Taylor is a 78 year old female with a history of SCC of the anal canal, stage T2N1 IIIA. Her oncologic history is as follows:    Developed bright red blood per rectum started about 4 years ago and progressively got worse. She underwent a colonoscopy in April 2017 which noted to have small anal fissure along with internal hemorrhoids. Symptoms continued and  she was referred to colorectal surgery for further evaluation. On exam she was found to have an 1.5 cm anal lesion on the left side along with left groin palpable adenopathy. Biopsy of anal mass came back positive for squamous cell carcinoma. Patient underwent staging workup with MRI pelvis and a PET scan- showed PET avid left anal canal mass along with small left inguinal FDG avid lymph nodes.      02/12/18- Started on concurrent chemoradiation with 5FU/Mitomycin     03/13/18 Skipped Mitomycin and proceeded with 5FU at reduced dose given neutropenia/old age     03/19/18 -04/04/18 admitted to the [facility] with intractable diarrhea, nausea, generalized weakness and fall at home resulting in multiple right rib fractures. Hospital course was complicated by small bowel obstruction for which patient was started on TPN. Bowel obstruction was managed conservatively eventually improved prior to discharge . 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 She was eventually discharged to transitional care unit.     05/25/18 05/25/18 MRI pelvis and PET scan done showed near complete resolution of patient's known anal lesion with no evidence for recurrence or metastatic disease.     07/25/18-07/30/18 Admitted to the hospital with traumatic right femoral neck fracture secondary to fall. She underwent right hemiarthroplasty and was discharged to transitional care  facility. Also noted to have a C. difficile infection and was started on oral vancomycin course.     11/1/18-11/5/18 admitted to hospital with a fall and left hip pain found to have pelvic ring fracture. Treated conservatively with pain control, PT/OT, and recommendations for PCP follow-up with DEXA scan and osteoporosis work-up.     She has had no signs of disease recurrence since that completion of treatment. She returns today for her survivorship visit.    Interval History:  Ms. Taylor returns to clinic today with her sister. She has concerns today about vaginal stenosis and a general hardness to her vaginal introitus and is wanting to have this examined. She denies any pain in this area but is worried about cancer developing. She also has had two days of watery diarrhea (two episodes total) which is unusual for her as previously her bowel movements have been normal. No pain or fevers. She did have scant blood in the diarrhea. No nausea or vomiting. She is recovering from her hip fractures and each day regaining her strength. Overall she feels like she is doing well.     Current Outpatient Medications   Medication Sig Dispense Refill     acetaminophen (TYLENOL) 500 MG tablet Take 1,000 mg by mouth 3 times daily       azithromycin (ZITHROMAX) 250 MG tablet Use 2 pills about 1 hour before dental procedures. (Patient not taking: Reported on 1/25/2019) 2 tablet 3     Calcium-Magnesium-Zinc 333-133-5 MG TABS per tablet Take 1 tablet by mouth daily       cholecalciferol (VITAMIN D3) 5000 units (125 mcg) capsule 1 CAP BY MOUTH DAILY (DX: OSTEOPOROSIS) 90 capsule 3     docusate sodium (COLACE) 100 MG capsule Take 1 capsule (100 mg) by mouth 2 times daily 60 capsule      oxyCODONE (ROXICODONE) 5 MG tablet Take 0.5 tablets (2.5 mg) by mouth every 4 hours as needed for moderate to severe pain (Patient not taking: Reported on 2/22/2019) 30 tablet 0     vitamin B complex with vitamin C (VITAMIN  B COMPLEX) TABS tablet Take 1  tablet by mouth daily       VITAMIN E PO Take 1 capsule by mouth daily         Physical Examination:  /72   Pulse 82   Temp 97  F (36.1  C) (Oral)   Wt 52.1 kg (114 lb 14.4 oz)   LMP  (LMP Unknown)   SpO2 97%   BMI 19.72 kg/m    Wt Readings from Last 10 Encounters:   03/04/19 54 kg (119 lb)   02/22/19 55.3 kg (122 lb)   02/22/19 55.8 kg (123 lb)   02/07/19 54.5 kg (120 lb 4 oz)   01/25/19 56.2 kg (124 lb)   01/17/19 56.2 kg (124 lb)   11/26/18 55.2 kg (121 lb 11.2 oz)   11/16/18 52.6 kg (116 lb)   11/01/18 54.4 kg (120 lb)   10/22/18 53 kg (116 lb 12.8 oz)     Constitutional: Well-appearing female in no acute distress. No formal exam performed as majority of visit spent in counseling.   Eyes: Pupils round and equal. No scleral icterus.  Cardiovascular: Extremities well perfused  Respiratory: Non-labored breathing  Neurologic: Cranial nerves II through XII are grossly intact.  Skin: No rashes, petechiae, or bruising noted on exposed skin.    Laboratory Data:  No new laboratory data      Assessment and Plan:  1. Treatment Review  Discussed treatment summary with patient including his/her diagnosis, surgery/radiation, chemotherapy, and ongoing treatments including her biopsy, chemo with 5FU and mitomycin, and radiation therapy. Treatment summary given and patient encouraged to review and call with questions.    2. Coping  Patient feels she is coping well following completion of her treatment. No need for counseling at this time.    3. Energy  Patient states that energy levels are improving following treatment. Did not discuss cancer rehab at this time as she is still recovering from multiple osteoporotic fractures. Encouraged continuing to exercise as tolerated as she recovers and to continue ortho follow-up and rehab as instructed.     3. Treatment Side Effects and Oncology Follow-Up  Discussed common long term side effects of their cancer treatment including but not limited to fatigue, psychosocial, pain,  "memory/concentration issues, bone effects, heart effects, fertility effects, lymphedema, and nervous system changes. Specifically discussed the following:  Pulmonary toxicities: Discussed not smoking and monitoring for new SOB. Need flu vaccine annually  Skin toxicities: Encourage good hygiene, especially given history of anal wounds  \"Chemo-brain\": Discussed long term cognitive effects and ways to improve this  Sexual concerns: Discussed vaginal stenosis and dryness after treatment. Gyn onc able to see patient today for exam and additional education regarding this  Cardiac risk: Discussed healthy lifestyle and proper diet. Continue to follow with PCP for BP monitoring and routine cardiac exams  Bowel Changes: Monitor for bowel changes and call if issues with diarrhea, rectal bleeding, or abdominal pain. Discussed that 2 days of diarrhea most likely viral gastroenteritis but did provide testing orders for C. Diff and enteric panel to be safe. Asked that she call if her symptoms persist  Bladder Changes: At risk for urinary tract infections and discussed signs of this  Osteoporosis: Already has issues with this. Continue calcium/vit D and following with her PCP and orthopedics team    For their specific treatment, discussed monitoring for rectal or abdominal/pelvic pain, rectal bleeding, and increased inguinal adenopathy and the following follow-up recommendations:    Follow-Up  -Medical Oncologist: Every 3-6 months for 2 years, then every 6 months for a total of 5 years  -Radiation oncologist: As directed  -Surgeon: As directed    Surveillance  -CT Imaging:   For Anal Cancer: Stage T3-T4 or inguinal node positive cancer imaging annually for 3 years (completed February 2019, will be due February 2020)    -Digital Rectal Exam/Inguinal Node Palpation (Anal Cancer Only): Every 3-6 months for 5 years (both surgery and medical oncology)    -Anoscopy (Anal Cancer Only): Every 6-12 month for 3 years (scheduled with surgery " team in May)     6. Health Maintenance   Discussed importance of continuing age-appropriate cancer screenings for femalesand the importance of reestablishing care with PCP. Patient has met with their primary care provider since completing treatment. Smoking cessation and alcohol cessation discussed. Stressed the importance of healthy diet and daily exercise. Recommended yearly influenza vaccination along with annual eye and dental appointments.    Greater than 60 min was spent with the patient with >50% of the time spent in counseling and coordinating care.      Tim Larose PA-C  [facility]  [address] [place] [id number] [phone]

## 2019-03-07 ENCOUNTER — ONCOLOGY SURVIVORSHIP VISIT (OUTPATIENT)
Dept: ONCOLOGY | Facility: CLINIC | Age: 79
End: 2019-03-07
Attending: PHYSICIAN ASSISTANT
Payer: MEDICARE

## 2019-03-07 ENCOUNTER — ONCOLOGY VISIT (OUTPATIENT)
Dept: ONCOLOGY | Facility: CLINIC | Age: 79
End: 2019-03-07
Attending: NURSE PRACTITIONER
Payer: MEDICARE

## 2019-03-07 VITALS
WEIGHT: 114.9 LBS | OXYGEN SATURATION: 97 % | SYSTOLIC BLOOD PRESSURE: 116 MMHG | DIASTOLIC BLOOD PRESSURE: 72 MMHG | TEMPERATURE: 97 F | HEART RATE: 82 BPM | BODY MASS INDEX: 19.72 KG/M2

## 2019-03-07 VITALS
SYSTOLIC BLOOD PRESSURE: 116 MMHG | WEIGHT: 114.9 LBS | BODY MASS INDEX: 19.72 KG/M2 | DIASTOLIC BLOOD PRESSURE: 72 MMHG | OXYGEN SATURATION: 97 % | TEMPERATURE: 97 F | HEART RATE: 82 BPM

## 2019-03-07 DIAGNOSIS — N95.2 ATROPHIC VAGINITIS: Primary | ICD-10-CM

## 2019-03-07 DIAGNOSIS — N89.5 VAGINAL STENOSIS: ICD-10-CM

## 2019-03-07 DIAGNOSIS — C21.1 MALIGNANT NEOPLASM OF ANAL CANAL (H): Primary | ICD-10-CM

## 2019-03-07 DIAGNOSIS — R19.7 DIARRHEA, UNSPECIFIED TYPE: ICD-10-CM

## 2019-03-07 PROCEDURE — 99215 OFFICE O/P EST HI 40 MIN: CPT | Mod: ZP | Performed by: PHYSICIAN ASSISTANT

## 2019-03-07 PROCEDURE — G0463 HOSPITAL OUTPT CLINIC VISIT: HCPCS

## 2019-03-07 PROCEDURE — G0463 HOSPITAL OUTPT CLINIC VISIT: HCPCS | Mod: ZF

## 2019-03-07 PROCEDURE — 99202 OFFICE O/P NEW SF 15 MIN: CPT | Mod: ZP | Performed by: NURSE PRACTITIONER

## 2019-03-07 ASSESSMENT — PAIN SCALES - GENERAL: PAINLEVEL: MILD PAIN (2)

## 2019-03-07 NOTE — NURSING NOTE
"Oncology Rooming Note    March 7, 2019 12:28 PM   Elizabeth Taylor is a 78 year old female who presents for:    Chief Complaint   Patient presents with     Oncology Clinic Visit     Return; Anal CA Survivorship visit     Initial Vitals: /72   Pulse 82   Temp 97  F (36.1  C) (Oral)   Wt 52.1 kg (114 lb 14.4 oz)   LMP  (LMP Unknown)   SpO2 97%   BMI 19.72 kg/m   Estimated body mass index is 19.72 kg/m  as calculated from the following:    Height as of 1/25/19: 1.626 m (5' 4\").    Weight as of this encounter: 52.1 kg (114 lb 14.4 oz). Body surface area is 1.53 meters squared.  Mild Pain (2) Comment: Data Unavailable   No LMP recorded (lmp unknown). Patient is postmenopausal.  Allergies reviewed: Yes  Medications reviewed: Yes    Medications: Medication refills not needed today.  Pharmacy name entered into [employer]:    [facility] - [place], [place] - [address]  [facility] [place], [place] - [address] [place], [place] - [address] [place] [address]    Clinical concerns: Patient complains of Left Lower abdomen pain. Patient is also very concerned about diarrhea that has started 2 days ago that is causing what pt describes as bleeding and very raw and sore skin on her anus and perineum. Tim was notified.      Diya Obando [facility]              "

## 2019-03-07 NOTE — LETTER
"3/7/2019       RE: Elizabeth Taylor  [address]  [facility] [address]     Dear Colleague,    Thank you for referring your patient, Elizabeth Taylor, to the [facility]. Please see a copy of my visit note below.    Date 3/7/19      Elizabeth Taylor has a history of anal cancer treated by radiation (see Med Onc note for full hisory). I was asked by Tim Med Onc PA, to see the patient regarding her concerns for vaginal stenosis. She completed radiation about year ago and was given a dilator to use but did not use it very often. She reports she has not been sexually active for \"several decades\". When she tried to use the dilator, she noticed a \"ridge\" as well as a \"hard spot\" which concerned her. She ultimately used her finger inserted into her vagina instead of the dilator. She was seen by a Gyn recently but was told that given her anal radiation and anatomy, she should see Gyn Onc. She states she does have urinary incontinence and wears pull ups as well as a couple pads (she is not sure if they are urinary or menstrual). She is applying creams to both her anal and vulvar areas; she has been using 2 different ones as each works better in the respective areas.           Physical exam:    External genitalia with significant atrophy and signs of contact dermatitis as well as radiation changes which extend to perianal area. Labia minora have agglutinated together with midline identified/palpable. Clitoris with small amount of cream within clitoral randall. Vaginal introitus with palpable stenotic area anteriorly. Able to insert small Sherman speculum fully into vagina as well as a finger, but unable to open speculum very much.       Plan:    Encouraged her to check to see if she is using a urinary pad or menstrual pad; she should be using a urinary incontinence pad. Discussed using organic coconut oil to the vulva to help with dryness; this can be applied as needed through out the " day. Discussed she can use the dilator (applying lubricant to the vulva/vaginal introitus/dilator) if she chooses; instructed to apply downward pressure to insert dilator and then she can apply pressure upwards toward the stenotic area; can keep dilator in for about 5 minutes. Answered all of her questions to the best of my ability.       20 minutes were spent with this patient, over 50% of that time was spent in symptom management, treatment planning and in counseling and coordination of care.      GRAHAM Varela, WHNP-BC, ANP-BC  Women's Health Nurse Practitioner  Adult Nurse Pracitioner  Division of Gynecologic Oncology

## 2019-03-07 NOTE — NURSING NOTE
"Oncology Rooming Note     March 7, 2019 12:28 PM   Elizabeth Taylor is a 78 year old female who presents for:          Chief Complaint   Patient presents with     Oncology Clinic Visit       Return; Anal CA Survivorship visit      Initial Vitals: /72   Pulse 82   Temp 97  F (36.1  C) (Oral)   Wt 52.1 kg (114 lb 14.4 oz)   LMP  (LMP Unknown)   SpO2 97%   BMI 19.72 kg/m   Estimated body mass index is 19.72 kg/m  as calculated from the following:    Height as of 1/25/19: 1.626 m (5' 4\").    Weight as of this encounter: 52.1 kg (114 lb 14.4 oz). Body surface area is 1.53 meters squared.  Mild Pain (2) Comment: Data Unavailable   No LMP recorded (lmp unknown). Patient is postmenopausal.  Allergies reviewed: Yes  Medications reviewed: Yes     Medications: Medication refills not needed today.  Pharmacy name entered into [employer]:    [facility] - [place], [place] - [address]  [facility] [place], [place] - [address] [place], [place] - [address] [place] [address]     Clinical concerns: Patient complains of Left Lower abdomen pain. Patient is also very concerned about diarrhea that has started 2 days ago that is causing what pt describes as bleeding and very raw and sore skin on her anus and perineum. Jocelyn was notified.        Diya Obando [facility]    "

## 2019-03-07 NOTE — PROGRESS NOTES
"Date 3/7/19      Elizabeth Taylor has a history of anal cancer treated by radiation (see Med Onc note for full hisory). I was asked by Tim Med Onc PA, to see the patient regarding her concerns for vaginal stenosis. She completed radiation about year ago and was given a dilator to use but did not use it very often. She reports she has not been sexually active for \"several decades\". When she tried to use the dilator, she noticed a \"ridge\" as well as a \"hard spot\" which concerned her. She ultimately used her finger inserted into her vagina instead of the dilator. She was seen by a Gyn recently but was told that given her anal radiation and anatomy, she should see Gyn Onc. She states she does have urinary incontinence and wears pull ups as well as a couple pads (she is not sure if they are urinary or menstrual). She is applying creams to both her anal and vulvar areas; she has been using 2 different ones as each works better in the respective areas.           Physical exam:    External genitalia with significant atrophy and signs of contact dermatitis as well as radiation changes which extend to perianal area. Labia minora have agglutinated together with midline identified/palpable. Clitoris with small amount of cream within clitoral randall. Vaginal introitus with palpable stenotic area anteriorly. Able to insert small Sherman speculum fully into vagina as well as a finger, but unable to open speculum very much.       Plan:    Encouraged her to check to see if she is using a urinary pad or menstrual pad; she should be using a urinary incontinence pad. Discussed using organic coconut oil to the vulva to help with dryness; this can be applied as needed through out the day. Discussed she can use the dilator (applying lubricant to the vulva/vaginal introitus/dilator) if she chooses; instructed to apply downward pressure to insert dilator and then she can apply pressure upwards toward the stenotic area; can keep dilator in " for about 5 minutes. Answered all of her questions to the best of my ability.       20 minutes were spent with this patient, over 50% of that time was spent in symptom management, treatment planning and in counseling and coordination of care.      GRAHAM Varela, WHNP-BC, ANP-BC  Women's Health Nurse Practitioner  Adult Nurse Pracitioner  Division of Gynecologic Oncology

## 2019-03-13 ENCOUNTER — PATIENT OUTREACH (OUTPATIENT)
Dept: CARE COORDINATION | Facility: CLINIC | Age: 79
End: 2019-03-13

## 2019-03-13 DIAGNOSIS — N89.5 VAGINAL STENOSIS: ICD-10-CM

## 2019-03-13 DIAGNOSIS — S72.009A FEMORAL NECK FRACTURE (H): Primary | ICD-10-CM

## 2019-03-13 NOTE — PROGRESS NOTES
Clinic Care Coordination Contact    Clinic Care Coordination Contact  OUTREACH    Referral Information:  Referral Source: SNF/TCU    Primary Diagnosis: Orthopedic    Chief Complaint   Patient presents with     Clinic Care Coordination - Follow-up     Clinic Care Coordination RN         Universal Utilization: Patient was hospitalized at [facility] 11/1-11/5/2018- with diagnosis of   Discharge Diagnoses      1. S/p ground level fall 2 weeks prior   2. Traumatic right femoral neck fracture s/p right hemiarthroplasty 7/26/18  3. Acute traumatic pain       Other diagnoses:  1. Stage III anal cancer s/p Chemo and radiation   2. Limited mobility 2/2 # 1   3. Recent catheter related wound due to perianal radiation   4. Hx of subacute right sided rib fractures 8-10 (dx 3/18)  5. Peripheral edema    Clinic Utilization  Difficulty keeping appointments:: No  Compliance Concerns: No  No-Show Concerns: No  No PCP office visit in Past Year: No  Utilization    Last refreshed: 3/11/2019  9:12 AM:  Hospital Admissions 3           Last refreshed: 3/11/2019  9:12 AM:  ED Visits 1           Last refreshed: 3/11/2019  9:12 AM:  No Show Count (past year) 2              Current as of: 3/11/2019  9:12 AM              Clinical Concerns:  Current Medical Concerns:    Femoral fracture -Patient is walking the ballroom at her sisters house 20 times 3 times a day  Doing back /arm shoulder exersies once a day  Using a walker for safety   Vaginal infection-Patient reports caused by radiation and previous farias catheter -Patient is washing the area 6 times a day using incontinence pads .coconut oil/cream and dilator as needed     Pain  Pain (GOAL):: No  Health Maintenance Reviewed:    Clinical Pathway: None    Medication Management:  Not discussed today      Functional Status:  Dependent ADLs:: Ambulation-walker  Bed or wheelchair confined:: No  Mobility Status: Independent w/Device    Living Situation:    "    Diet/Exercise/Sleep:  Inadequate nutrition (GOAL):: No  Food Insecurity: No  Tube Feeding: No  Exercise:: Yes  Days per week of moderate to strenuous exercise (like a brisk walk): 7  On average, minutes per day of exercise at this level: 20  How intense was your typical exercise? : Light (like stretching or slow walking)  Exercise Minutes per Week: 140  Inadequate activity/exercise (GOAL):: No  Significant changes in sleep pattern (GOAL): No    Transportation:  Transportation concerns (GOAL):: No  Transportation means:: Family     Psychosocial:  Advent or spiritual beliefs that impact treatment:: No     Financial/Insurance:   Financial/Insurance concerns (GOAL):: No    Resources and Interventions:  Current Resources:    ;   Community Resources: , None  Supplies used at home:: None  Equipment Currently Used at Home: walker, rolling    Advance Care Plan/Directive  Advanced Care Plans/Directives on file:: No    Referrals Placed: SNF, None     Goals:   Goals        General    #2 Monitoring (pt-stated)     Notes - Note created  3/13/2019  1:25 PM by Quynh Mckeon, RN    Goal Statement: I will monitor vaginal infection   Measure of Success: healed infection   Supportive Steps to Achieve:   I will wash area 6 times a day  Use coconut oil /cream as directed and use urinary incontinence pad   I use dilator if needed   Barriers: ongoing infection caused by radiation and previous farias catheter   Strengths: agrees with plan   Date to Achieve By: 4/13/2019  Patient expressed understanding of goal: yes           Lifestyle    Increase physical activity     Notes - Note edited  3/13/2019  1:21 PM by Quynh Mckeon, RN    Goal Statement: I will increase my strength  Measure of Success: Increased mobility with daily activities   Supportive Steps to Achieve:   I will do daily home PT exercises and us walker for safety when needed   I will walk the \"ballroom\" in my sister house 20 rounds 3 times a day   I will do my arm and " shoulder exercises 20 minutes a day   I will due back exercises once a day   Barriers: Deconditioned   Strengths: Supportive sister   Date to Achieve By:4/13/2019-  Patient expressed understanding of goal: Yes                     Outreach Frequency: monthly  Future Appointments              In 2 days Monae Fernandes MD [facility] Sports Medicine, [facility]    In 2 months Joanie Gomez APRN CNP Radiation Oncology Clinic, [facility]    In 2 months Emir Rosas MD [facility] Colon and Rectal Surgery, [facility]    In 5 months Shen Ray MD [facility], [facility]    In 5 months Zaire Madison MD Radiation Oncology Clinic, [facility]          Plan:   Patient will continue daily walks and shoulder arm and back exercises   Patient will continue to was vaginal infection area 6 times a day ,use coconut oil/cream and dilator as needed   Patient agrees to monthly follow up calls     Quynh Mckeon RN / Clinical Care Coordinator     [facility]   [email or web address]  Office :  [phone] / Fax :  [phone]

## 2019-03-13 NOTE — LETTER
[facility]  Complex Care Plan  About Me  Patient Name:  Elizabeth Taylor    YOB: 1940  Age:     78 year old   [facility] MRN:   [id number] Telephone Information:  Home Phone [phone]   Mobile [phone]       Address:    [address] [facility] [address] Email address:  [email or web address]      Emergency Contact(s)  Name Relationship Lgl Grd Work Phone Home Phone Mobile Phone   1. AMARJIT CHAMBERS * Sister No  [phone]    2. ACE SANABRIA Friend No   [phone]   3. DILLON STEWART Friend No  [phone]            Primary language:  English     needed? No   [facility]:  [phone] op. 1  Other communication barriers: None  Preferred Method of Communication:  Mail  Current living arrangement:    Mobility Status/ Medical Equipment: Independent w/Device    Health Maintenance  Health Maintenance Reviewed:      My Access Plan  Medical Emergency 911   Primary Clinic Line [facility] - [phone]   24 Hour Appointment Line [phone] or  [phone] ([phone]) (toll-free)   24 Hour Nurse Line [phone] (toll-free)   Preferred Urgent Care     Preferred Hospital     Preferred Pharmacy [facility] - [place], [place] - [address]     Behavioral Health Crisis Line The National Suicide Prevention Lifeline at 1-558.473.6780 or 911     My Care Team Members    Patient Care Team       Relationship Specialty Notifications Start End    Baron Seth MD PCP - General Family Practice  7/25/18     Phone: [phone] Fax: [phone]         [address] [facility] [address]    Shen Ray MD MD Hematology & Oncology Admissions 2/2/18     Phone: [phone] Fax: [phone]         [address] [place] [address]    Pauline Aguayo RN Nurse Coordinator Hematology & Oncology Admissions 2/12/18     Phone: [phone] Pager: [phone]        Zaire Madison MD  "MD Radiation Oncology  4/17/18     Phone: [phone] Fax: [phone]         [address] [facility] [address] [facility] [address]    Quynh Mckeon, RN Lead Care Coordinator Primary Care - [facility] Admissions 5/31/18     Phone: [phone] Fax: [phone]        Emir Rosas MD MD Colon and Rectal Surgery  9/5/18     Phone: [phone] Fax: [phone]         [address] [facility] [address] [facility] [place] [address]    Zaire Phan MD MD Orthopedics  9/6/18     Phone: [phone] Fax: [phone]         [address] [place] [address]    Baron Seth MD Assigned PCP   9/30/18     Phone: [phone] Fax: [phone]         [address] [facility] [address]    [facility] ([facility]  1/23/19     Phone: [phone]         Monae Fernandes MD MD Family Practice  2/22/19     Phone: [phone] Fax: [phone] [address] [facility] [address]            My Care Plans  Self Management and Treatment Plan  Goals and (Comments)  Goals        General    #2 Monitoring (pt-stated)     Notes - Note created  3/13/2019  1:25 PM by Quynh Mckeon RN    Goal Statement: I will monitor vaginal infection   Measure of Success: healed infection   Supportive Steps to Achieve:   I will wash area 6 times a day  Use coconut oil /cream as directed and use urinary incontinence pad   I use dilator if needed   Barriers: ongoing infection caused by radiation and previous farias catheter   Strengths: agrees with plan   Date to Achieve By: 4/13/2019  Patient expressed understanding of goal: yes           Lifestyle    Increase physical activity     Notes - Note edited  3/13/2019  1:21 PM by Quynh Mckeon, RN    Goal Statement: I will increase my strength  Measure of Success: Increased mobility with daily activities   Supportive Steps to Achieve:   I will do daily home PT exercises and us walker for safety when needed   I will walk the \"ballroom\" " in my sister house 20 rounds 3 times a day   I will do my arm and shoulder exercises 20 minutes a day   I will due back exercises once a day   Barriers: Deconditioned   Strengths: Supportive sister   Date to Achieve By:4/13/2019-  Patient expressed understanding of goal: Yes                Action Plans on File: None                      Advance Care Plans/Directives Type: None       My Medical and Care Information  Problem List   Patient Active Problem List   Diagnosis     Dry eye syndrome     Malignant neoplasm of anal canal (H)     Diarrhea     Femoral neck fracture (H)     Closed fracture of neck of right femur, initial encounter (H)     Hip fracture (H)     History of total hip replacement, unspecified laterality     Atrophic vaginitis     Vaginal stenosis      Current Medications and Allergies:  See printed Medication Report.    Care Coordination Start Date: 1/28/2019   Frequency of Care Coordination: monthly   Form Last Updated: 03/13/2019

## 2019-03-15 ENCOUNTER — PRE VISIT (OUTPATIENT)
Dept: ORTHOPEDICS | Facility: CLINIC | Age: 79
End: 2019-03-15

## 2019-03-15 ENCOUNTER — OFFICE VISIT (OUTPATIENT)
Dept: ORTHOPEDICS | Facility: CLINIC | Age: 79
End: 2019-03-15
Payer: MEDICARE

## 2019-03-15 VITALS — HEIGHT: 62 IN | WEIGHT: 117 LBS | RESPIRATION RATE: 16 BRPM | BODY MASS INDEX: 21.53 KG/M2

## 2019-03-15 DIAGNOSIS — M81.0 OSTEOPOROSIS, UNSPECIFIED OSTEOPOROSIS TYPE, UNSPECIFIED PATHOLOGICAL FRACTURE PRESENCE: Primary | ICD-10-CM

## 2019-03-15 ASSESSMENT — MIFFLIN-ST. JEOR: SCORE: 962.93

## 2019-03-15 NOTE — PROGRESS NOTES
Attending Note:   I have personally examined this patient and have reviewed the clinical presentation and progress note with the fellow. I agree with the treatment plan as outlined. The plan was formulated with the fellow on the day of the patient's visit. I have reviewed all imaging with the fellow and agree with the findings in the documentation.   > 45 min of total time spent in one-on-one evalution and discussion with patient regarding nature of problem, course, prior treatments, and therapeutic options,> 50% of which was spent in counseling and coordination of care:    Monae Fernandes MD, CAQ, CCD  [facility]  Sports Medicine and Bone Health

## 2019-03-15 NOTE — LETTER
RE: Elizabeth Taylor  [address]  [place], [place]. \Hospital Has a New Name and Outlook.\"" [id number]     SUBJECTIVE:    Elizabeth Taylor is a 78 year old female here today to disuss osteoporosis.  Previous DEXA done 1/17/19.  T-score showed her to be Osteoporosis.  Risk factors for osteroporosis include postmenopausal,  or , thin habitus, hyperthyroid and Cancer.    cc: bone health fu  - Vitamin D Intake/Level: 1000U daily - level nov 2018 was 19  - Calcium: 1 glass of milk daily, 1 serving of cheese and yogurt per day  - Hx Stress Fx's: hip fractures x 2  - Current Meds: tylenol  - tob use: none  - etoh use: none  - Caffeine Use: 2 cups of coffee  - Exercise: Exercises from PT  - Hx Kidney Stones: none  - Hx of Chemo, Skeletal Radiation, or Hormone Therapy:   Chemo and radiation for anal cancer. none  - Referral:  - Hx of GERD: none  - Prior bisphosphonate: none  - Labs (Cr, Vit D, Ca, PTH):  - Prior DEXA scan: January 2019    Past Medical History:   Diagnosis Date     Anal cancer (H)      Endometriosis, site unspecified      Thyroiditis, unspecified     Thryoiditis-resolved       Past Surgical History:   Procedure Laterality Date     APPENDECTOMY      as a child     ARTHROPLASTY HIP Right 7/26/2018    Procedure: ARTHROPLASTY HIP;  Right Hip Hemiarthroplasty;  Surgeon: Zaire Phan MD;  [facility]: [facility]     COLONOSCOPY N/A 4/13/2017    Procedure: COLONOSCOPY;  Surgeon: Juan Dos Santos MD;  [facility]     INSERT PORT VASCULAR ACCESS Right 2/8/2018    Procedure: INSERT PORT VASCULAR ACCESS;  Place Single Lumen Venous Chest Port Placement;  Surgeon: Zaire Moreira PA-C;  [facility]     SURGICAL HISTORY OF -       endometriosis       Current Outpatient Medications   Medication Sig Dispense Refill     acetaminophen (TYLENOL) 500 MG tablet Take 1,000 mg by mouth 3 times daily       Calcium-Magnesium-Zinc 333-133-5 MG TABS per tablet Take 1 tablet by mouth daily       cholecalciferol  (VITAMIN D3) 5000 units (125 mcg) capsule 1 CAP BY MOUTH DAILY (DX: OSTEOPOROSIS) 90 capsule 3     vitamin B complex with vitamin C (VITAMIN  B COMPLEX) TABS tablet Take 1 tablet by mouth daily       VITAMIN E PO Take 1 capsule by mouth daily       docusate sodium (COLACE) 100 MG capsule Take 1 capsule (100 mg) by mouth 2 times daily (Patient not taking: Reported on 3/7/2019) 60 capsule      oxyCODONE (ROXICODONE) 5 MG tablet Take 0.5 tablets (2.5 mg) by mouth every 4 hours as needed for moderate to severe pain (Patient not taking: Reported on 2/22/2019) 30 tablet 0     Family History   Problem Relation Age of Onset     Cancer Sister      Cancer Maternal Grandmother         lymphoma     Heart Disease Father         MI     Uterine Cancer Niece      Social History     Socioeconomic History     Marital status: Single     Spouse name: Not on file     Number of children: Not on file     Years of education: Not on file     Highest education level: Not on file   Occupational History     Not on file   Social Needs     Financial resource strain: Not on file     Food insecurity:     Worry: Not on file     Inability: Not on file     Transportation needs:     Medical: Not on file     Non-medical: Not on file   Tobacco Use     Smoking status: Never Smoker     Smokeless tobacco: Never Used   Substance and Sexual Activity     Alcohol use: No     Drug use: No     Sexual activity: No   Lifestyle     Physical activity:     Days per week: Not on file     Minutes per session: Not on file     Stress: Not on file   Relationships     Social connections:     Talks on phone: Not on file     Gets together: Not on file     Attends Mosque service: Not on file     Active member of club or organization: Not on file     Attends meetings of clubs or organizations: Not on file     Relationship status: Not on file     Intimate partner violence:     Fear of current or ex partner: Not on file     Emotionally abused: Not on file     Physically  "abused: Not on file     Forced sexual activity: Not on file   Other Topics Concern     Parent/sibling w/ CABG, MI or angioplasty before 65F 55M? Not Asked   Social History Narrative     Not on file     OBJECTIVE:  Resp 16   Ht 1.581 m (5' 2.25\")   Wt 53.1 kg (117 lb)   LMP  (LMP Unknown)   BMI 21.23 kg/m     There has been a change in patients height ~4 inches    ASSESSMENT:  Osteoporosis    PLAN:  We had a lengthy (1 hour) discussion about various treatment regimens for her bone health. Due to her severe osteoporosis and history of fractures, she should qualify for Forteo. She was very resistant to the thought of doing daily injections. She is insistent that she just needs to \"not fall\". We did discuss other treatment options such as Fosamax and Reclast which would be OK but far inferior to Forteo.     She will do research at home and get back to us with her decision. She did not want us to put any orders in.     The importance of calcium and vitamin D in bone health was discussed in detail. A calcium intake of 1500 mg total per day in divided doses, to include diet and supplements, was recommended.  I have urged the patient to obtain as much as the recommended amount of calcium as possible from the diet.  I recommended use of the International Osteoporosis Foundation Calcium Calculator to get an estimate of daily total intake in the diet (www.iofcalciumcalculator).800-1000 international units vitamin D daily was also recommended.       We also discussed the role of weight bearing exercise for the treatment of bone loss.    Patient seen and discussed with Dr. Dena Walters MD  Primary Care Sports Medicine Fellow          Attending Note:   I have personally examined this patient and have reviewed the clinical presentation and progress note with the fellow. I agree with the treatment plan as outlined. The plan was formulated with the fellow on the day of the patient's visit. I have reviewed all " imaging with the fellow and agree with the findings in the documentation.   > 45 min of total time spent in one-on-one evalution and discussion with patient regarding nature of problem, course, prior treatments, and therapeutic options,> 50% of which was spent in counseling and coordination of care:    Monae Fernandes MD, CAQ, CCD  [facility]  Sports Medicine and Bone Health

## 2019-03-15 NOTE — PATIENT INSTRUCTIONS
1. Take in 1500 mg total per day of calcium  2. You can use the International Osteoporosis Foundation Calcium Calculator to get an estimate of daily total intake in the diet (www.iofcalciumcalculator)  3. Best to get calcium from diet, but can use supplement if needed.   4. 800-1000 international units vitamin D daily was also recommended.     5. Recommend starting Forteo to help improve bone health - 1 shot every day for 2 years  6. Return for teaching you receive the medication     Other treatment options:   IV Reclast - once per year infusion. This is an OK medication but not as good as Forteo

## 2019-03-15 NOTE — PROGRESS NOTES
SUBJECTIVE:    Elizabeth Taylor is a 78 year old female here today to disuss osteoporosis.  Previous DEXA done 1/17/19.  T-score showed her to be Osteoporosis.  Risk factors for osteroporosis include postmenopausal,  or , thin habitus, hyperthyroid and Cancer.    cc: bone health fu  - Vitamin D Intake/Level: 1000U daily - level nov 2018 was 19  - Calcium: 1 glass of milk daily, 1 serving of cheese and yogurt per day  - Hx Stress Fx's: hip fractures x 2  - Current Meds: tylenol  - tob use: none  - etoh use: none  - Caffeine Use: 2 cups of coffee  - Exercise: Exercises from PT  - Hx Kidney Stones: none  - Hx of Chemo, Skeletal Radiation, or Hormone Therapy:   Chemo and radiation for anal cancer. none  - Referral:  - Hx of GERD: none  - Prior bisphosphonate: none  - Labs (Cr, Vit D, Ca, PTH):  - Prior DEXA scan: January 2019    Past Medical History:   Diagnosis Date     Anal cancer (H)      Endometriosis, site unspecified      Thyroiditis, unspecified     Thryoiditis-resolved       Past Surgical History:   Procedure Laterality Date     APPENDECTOMY      as a child     ARTHROPLASTY HIP Right 7/26/2018    Procedure: ARTHROPLASTY HIP;  Right Hip Hemiarthroplasty;  Surgeon: Zaire Phan MD;  [facility]     COLONOSCOPY N/A 4/13/2017    Procedure: COLONOSCOPY;  Surgeon: Juan Dos Santos MD;  [facility]     INSERT PORT VASCULAR ACCESS Right 2/8/2018    Procedure: INSERT PORT VASCULAR ACCESS;  Place Single Lumen Venous Chest Port Placement;  Surgeon: Zaire Moreira PA-C;  [facility]     SURGICAL HISTORY OF -       endometriosis       Current Outpatient Medications   Medication Sig Dispense Refill     acetaminophen (TYLENOL) 500 MG tablet Take 1,000 mg by mouth 3 times daily       Calcium-Magnesium-Zinc 333-133-5 MG TABS per tablet Take 1 tablet by mouth daily       cholecalciferol (VITAMIN D3) 5000 units (125 mcg) capsule 1 CAP BY MOUTH DAILY (DX: OSTEOPOROSIS)  90 capsule 3     vitamin B complex with vitamin C (VITAMIN  B COMPLEX) TABS tablet Take 1 tablet by mouth daily       VITAMIN E PO Take 1 capsule by mouth daily       docusate sodium (COLACE) 100 MG capsule Take 1 capsule (100 mg) by mouth 2 times daily (Patient not taking: Reported on 3/7/2019) 60 capsule      oxyCODONE (ROXICODONE) 5 MG tablet Take 0.5 tablets (2.5 mg) by mouth every 4 hours as needed for moderate to severe pain (Patient not taking: Reported on 2/22/2019) 30 tablet 0     Family History   Problem Relation Age of Onset     Cancer Sister      Cancer Maternal Grandmother         lymphoma     Heart Disease Father         MI     Uterine Cancer Niece      Social History     Socioeconomic History     Marital status: Single     Spouse name: Not on file     Number of children: Not on file     Years of education: Not on file     Highest education level: Not on file   Occupational History     Not on file   Social Needs     Financial resource strain: Not on file     Food insecurity:     Worry: Not on file     Inability: Not on file     Transportation needs:     Medical: Not on file     Non-medical: Not on file   Tobacco Use     Smoking status: Never Smoker     Smokeless tobacco: Never Used   Substance and Sexual Activity     Alcohol use: No     Drug use: No     Sexual activity: No   Lifestyle     Physical activity:     Days per week: Not on file     Minutes per session: Not on file     Stress: Not on file   Relationships     Social connections:     Talks on phone: Not on file     Gets together: Not on file     Attends Congregational service: Not on file     Active member of club or organization: Not on file     Attends meetings of clubs or organizations: Not on file     Relationship status: Not on file     Intimate partner violence:     Fear of current or ex partner: Not on file     Emotionally abused: Not on file     Physically abused: Not on file     Forced sexual activity: Not on file   Other Topics Concern      "Parent/sibling w/ CABG, MI or angioplasty before 65F 55M? Not Asked   Social History Narrative     Not on file     OBJECTIVE:  Resp 16   Ht 1.581 m (5' 2.25\")   Wt 53.1 kg (117 lb)   LMP  (LMP Unknown)   BMI 21.23 kg/m    There has been a change in patients height ~4 inches    ASSESSMENT:  Osteoporosis    PLAN:  We had a lengthy (1 hour) discussion about various treatment regimens for her bone health. Due to her severe osteoporosis and history of fractures, she should qualify for Forteo. She was very resistant to the thought of doing daily injections. She is insistent that she just needs to \"not fall\". We did discuss other treatment options such as Fosamax and Reclast which would be OK but far inferior to Forteo.     She will do research at home and get back to us with her decision. She did not want us to put any orders in.     The importance of calcium and vitamin D in bone health was discussed in detail. A calcium intake of 1500 mg total per day in divided doses, to include diet and supplements, was recommended.  I have urged the patient to obtain as much as the recommended amount of calcium as possible from the diet.  I recommended use of the International Osteoporosis Foundation Calcium Calculator to get an estimate of daily total intake in the diet (www.iofcalciumcalculator).800-1000 international units vitamin D daily was also recommended.       We also discussed the role of weight bearing exercise for the treatment of bone loss.    Patient seen and discussed with Dr. Dena Walters MD  Primary Care Sports Medicine Fellow        "

## 2019-04-15 ENCOUNTER — PATIENT OUTREACH (OUTPATIENT)
Dept: CARE COORDINATION | Facility: CLINIC | Age: 79
End: 2019-04-15

## 2019-04-15 DIAGNOSIS — S72.009A FEMORAL NECK FRACTURE (H): Primary | ICD-10-CM

## 2019-04-15 NOTE — PROGRESS NOTES
"Clinic Care Coordination Contact    Clinic Care Coordination Contact  OUTREACH    Referral Information:  Referral Source: SNF/TCU    Primary Diagnosis: Orthopedic    Chief Complaint   Patient presents with     Clinic Care Coordination - Follow-up        Universal Utilization: Patient was hospitalized at [facility] 11/1-11/5/2018- with diagnosis of   Discharge Diagnoses      1. S/p ground level fall 2 weeks prior   2. Traumatic right femoral neck fracture s/p right hemiarthroplasty 7/26/18  3. Acute traumatic pain       Other diagnoses:  1. Stage III anal cancer s/p Chemo and radiation   2. Limited mobility 2/2 # 1   3. Recent catheter related wound due to perianal radiation   4. Hx of subacute right sided rib fractures 8-10 (dx 3/18)  Peripheral edema   Clinic Utilization  Difficulty keeping appointments:: No  Compliance Concerns: No  No-Show Concerns: No  No office visit in Past Year: No  Utilization    Last refreshed: 3/22/2019  4:16 PM:  Hospital Admissions 3           Last refreshed: 3/22/2019  4:16 PM:  ED Visits 0           Last refreshed: 3/22/2019  4:16 PM:  No Show Count (past year) 2              Current as of: 3/22/2019  4:16 PM              Clinical Concerns:  Current Medical Concerns:  Patient reports she missed stepped while walking and right ankle is a bit swollen  Patient states she continues to walk and refuses a PCP visit  for an  Xray at this time.   Patient states she has arthritis soreness in her right shoulder and continues to exercise that arm.  Patient continues her walks 20 times 3 times a day in her sisters \"ballroom\"   Patient states she switches from numbness to pain with her arthritis .  Patient states she continues to wash vaginal area 6 times a day and it is healing nicely.      Quynh Mckeon RN, Care Coordinator   [facility] -Care Coordination  Liya Sousa     v  Pain  Pain (GOAL):: No  Health Maintenance Reviewed:    Clinical " "Pathway: None    Medication Management:  Not discussed today      Functional Status:  Dependent ADLs:: Ambulation-walker  Bed or wheelchair confined:: No  Mobility Status: Independent w/Device    Living Situation:       Diet/Exercise/Sleep:  Inadequate nutrition (GOAL):: No  Food Insecurity: No  Tube Feeding: No  Exercise:: Yes  Inadequate activity/exercise (GOAL):: No  Significant changes in sleep pattern (GOAL): No    Transportation:  Transportation concerns (GOAL):: No  Transportation means:: Family     Psychosocial:  Tenriism or spiritual beliefs that impact treatment:: No     Financial/Insurance:   Financial/Insurance concerns (GOAL):: No  Community Resources: None  Supplies used at home:: None  Equipment Currently Used at Home: walker, rolling    Advance Care Plan/Directive  Advanced Care Plans/Directives on file:: No    Referrals Placed: SNF, None     Goals:   Goals        General    #2 Monitoring (pt-stated)     Notes - Note edited  4/15/2019  3:09 PM by Quynh Mckeon RN    Goal Statement: I will monitor vaginal infection   Measure of Success: healed infection   Supportive Steps to Achieve:   I will wash area 6 times a day  Use coconut oil /cream as directed and use urinary incontinence pad   I use dilator if needed   Barriers: ongoing infection caused by radiation and previous farias catheter   Strengths: agrees with plan   Date to Achieve By: 5/15/2019  Patient expressed understanding of goal: yes           Lifestyle    Increase physical activity     Notes - Note edited  4/15/2019  3:10 PM by Quynh Mckeon, RN    Goal Statement: I will increase my strength  Measure of Success: Increased mobility with daily activities   Supportive Steps to Achieve:   I will do daily home PT exercises and us walker for safety when needed   I will walk the \"ballroom\" in my sister house 20 rounds 3 times a day   I will do my arm and shoulder exercises 20 minutes a day   I will due back exercises once a day   Barriers: " Deconditioned   Strengths: Supportive sister   Date to Achieve By:5/15/2019  Patient expressed understanding of goal: Yes                 Outreach Frequency: monthly  Future Appointments              In 1 month Joanie Gomez APRN CNP Radiation Oncology Clinic, [facility]    In 1 month Emir Rosas MD [facility]al [facility], [facility]    In 4 months [facility]    In 4 months Zaire Madison MD Radiation Oncology Clinic, [facility]          Plan:   Patient will continue to walk 3 times a day and do her shoulder and back home exercises   Patient will continue to wash her vaginal area 6 times a day to promote healing  Patient transferred to the appointment desk to make an appointment with PCP  CC will continue monthly calls     Quynh Mckeon RN, Care Coordinator   [facility] -Care Coordination  Liya Sousa

## 2019-04-26 ENCOUNTER — OFFICE VISIT (OUTPATIENT)
Dept: FAMILY MEDICINE | Facility: CLINIC | Age: 79
End: 2019-04-26
Payer: MEDICARE

## 2019-04-26 VITALS
DIASTOLIC BLOOD PRESSURE: 73 MMHG | BODY MASS INDEX: 21.41 KG/M2 | SYSTOLIC BLOOD PRESSURE: 107 MMHG | HEART RATE: 73 BPM | WEIGHT: 118 LBS | RESPIRATION RATE: 18 BRPM | TEMPERATURE: 97.3 F | OXYGEN SATURATION: 99 %

## 2019-04-26 DIAGNOSIS — M25.512 LEFT SHOULDER PAIN, UNSPECIFIED CHRONICITY: ICD-10-CM

## 2019-04-26 DIAGNOSIS — M25.552 HIP PAIN, LEFT: Primary | ICD-10-CM

## 2019-04-26 DIAGNOSIS — S93.401A SPRAIN OF RIGHT ANKLE, UNSPECIFIED LIGAMENT, INITIAL ENCOUNTER: ICD-10-CM

## 2019-04-26 DIAGNOSIS — Z23 NEED FOR PROPHYLACTIC VACCINATION AND INOCULATION AGAINST INFLUENZA: ICD-10-CM

## 2019-04-26 DIAGNOSIS — Z23 NEED FOR PROPHYLACTIC VACCINATION WITH TETANUS-DIPHTHERIA (TD): ICD-10-CM

## 2019-04-26 DIAGNOSIS — M62.838 MUSCLE SPASMS OF BOTH LOWER EXTREMITIES: ICD-10-CM

## 2019-04-26 PROCEDURE — 99214 OFFICE O/P EST MOD 30 MIN: CPT | Performed by: FAMILY MEDICINE

## 2019-04-26 RX ORDER — GABAPENTIN 100 MG/1
100-300 CAPSULE ORAL
Qty: 90 CAPSULE | Refills: 1 | Status: SHIPPED | OUTPATIENT
Start: 2019-04-26 | End: 2019-07-03

## 2019-04-26 NOTE — PATIENT INSTRUCTIONS
Magnesium 500mg daily may help the spasms.     Acetopmenophen 500-1000mg up to 4 times daily. Max 3000mg daily.    Let have your use the gabaqpentin at night for the leg spasms.

## 2019-04-26 NOTE — PROGRESS NOTES
SUBJECTIVE:   Elizabeth Taylor is a 78 year old female who presents to clinic today for the following   health issues:    Musculoskeletal problem/pain      Duration: 1 week     Description  Location: right foot     Intensity:  moderate    Accompanying signs and symptoms: numbness and swelling    History  Previous similar problem: no   Previous evaluation:  none    Precipitating or alleviating factors:  Trauma or overuse: no     She continues to get spasm type pain in the areas of her thighs especially at night. Tylenol helps some. No radiation of the pain. No numbness/tingling. Nor back symptoms. pt would like something for pain, pt states she take too much tylenol.    Shoulder pain on the left side. She thinks onset when she was bearing more weight on canes/walker. Hurts in particular with over the head movements. She notes some improvement since she has started some physical therapy type stretching exercises.     Ankle pain for about a week. Thinks she stepped odd and painful since/ mild swelling. Able to walk and weight bear. Pain more laterally.     med hx, family hx, and soc hx reviewed and updated-significant osteoporosis not o meds. Recommended to use forteo over reclast but so far has not started anything.      OBJECTIVE: /73   Pulse 73   Temp 97.3  F (36.3  C) (Oral)   Resp 18   Wt 53.5 kg (118 lb)   LMP  (LMP Unknown)   SpO2 99%   BMI 21.41 kg/m     Hip with limited range of motion. Non tender  Shoulder exam: reduced range of motion of throughout. Giving away type weakness due to pain, positive impingements signs.   Foot/ankle exam: soft tissue swelling and tenderness over the lateral malleolus.       ICD-10-CM    1. Hip pain, left M25.552 gabapentin (NEURONTIN) 100 MG capsule   2. Need for prophylactic vaccination and inoculation against influenza Z23    3. Need for prophylactic vaccination with tetanus-diphtheria (Td) Z23    4. Muscle spasms of both lower extremities M62.838    5. Left  shoulder pain, unspecified chronicity M25.512     trial magnesium for the leg cramps along with gabapentin. Will avoid narcotics due to high fall risk. Tylenol dosing given. Discussed shoulder pain. Likely rotator cuff syndrome. Could consider injection or physical therapy If fails to continue to improve. The ankle pain is very likely a sprain. Conservative treatment measures discussed. Declines immunizations. rec wellness appt.

## 2019-05-15 ENCOUNTER — PATIENT OUTREACH (OUTPATIENT)
Dept: CARE COORDINATION | Facility: CLINIC | Age: 79
End: 2019-05-15

## 2019-05-15 DIAGNOSIS — S72.009A FEMORAL NECK FRACTURE (H): Primary | ICD-10-CM

## 2019-05-15 NOTE — PROGRESS NOTES
Clinic Care Coordination Contact    Clinic Care Coordination Contact  OUTREACH    Referral Information:            Chief Complaint   Patient presents with     Clinic Care Coordination - Follow-up     Clinic Care Coordination RN         Universal Utilization: Patient was hospitalized at [facility] 11/1-11/5/2018- with diagnosis of   Discharge Diagnoses      1. S/p ground level fall 2 weeks prior   2. Traumatic right femoral neck fracture s/p right hemiarthroplasty 7/26/18  3. Acute traumatic pain       Other diagnoses:  1. Stage III anal cancer s/p Chemo and radiation   2. Limited mobility 2/2 # 1   3. Recent catheter related wound due to perianal radiation   4. Hx of subacute right sided rib fractures 8-10 (dx 3/18)  Peripheral edema      Utilization    Last refreshed: 5/3/2019  7:05 AM:  Hospital Admissions 2           Last refreshed: 5/3/2019  7:05 AM:  ED Visits 0           Last refreshed: 5/3/2019  7:05 AM:  No Show Count (past year) 0              Current as of: 5/3/2019  7:05 AM              Clinical Concerns:  Current Medical Concerns:  Patient continues to do her exercises  Walk 20 times around ballroom for 5 minutes daily up and down stairs once a day  Some ankle swelling but goes away in the morning . No increased SOB reported.   Patient has a Oncology appointment 5/20 and will discuss further   Health Maintenance Reviewed:    Clinical Pathway: None    Medication Management:  Not discussed      Functional Status: uses cane        Living Situation: Lives with sister and her         Goals:   Goals        General    #2 Monitoring (pt-stated)     Notes - Note edited  5/15/2019  3:21 PM by Quynh Mckeon, RN    Goal Statement: I will monitor vaginal infection   Measure of Success: healed infection   Supportive Steps to Achieve:   I will wash area 6 times a day  Use coconut oil /cream as directed and use urinary incontinence pad   I use dilator if needed   Barriers: ongoing infection  "caused by radiation and previous farias catheter   Strengths: agrees with plan   Date to Achieve By:6/15/2019  Patient expressed understanding of goal: yes           Lifestyle    Increase physical activity     Notes - Note edited  5/15/2019  3:19 PM by Quynh Mckeon RN    Goal Statement: I will increase my strength  Measure of Success: Increased mobility with daily activities   Supportive Steps to Achieve:   I will do daily home PT exercises and us walker for safety when needed   I will walk the \"ballroom\" in my sister house 20 rounds 3 times a day   I will do my arm and shoulder exercises 20 minutes a day   I will due back exercises once a day   Barriers: Deconditioned   Strengths: Supportive sister   Date to Achieve By:6/15/2019  Patient expressed understanding of goal: Yes                    Future Appointments              In 5 days Joanie Gomez APRN CNP Radiation Oncology Clinic, [facility]    In 1 month Baron Seth MD [facility]    In 2 months Emir Rosas MD [facility] Colon and Rectal Surgery, [facility]    In 3 months [facility]    In 3 months Zaire Madison MD Radiation Oncology Clinic, [facility]          Plan: CC will follow up monthly     Quynh Mckeon RN, Care Coordinator   [facility] -Care Coordination  [place] [facility] [place]  [phone]      "

## 2019-05-16 NOTE — PLAN OF CARE
Problem: Patient Care Overview  Goal: Plan of Care/Patient Progress Review  Discharge Planner PT   Patient plan for discharge: TCU  Current status: Focus on bed mobility, functional transfers, short distance ambulation, LE strength. Pt cont. To perform all bed mobility and transfers very slowly to avoid pain. Max A x 1 bed mobility, CGA STS w/ FWW, pt limited in tolerance by fatigue and dizziness, Seated 101/65; standing 82/54 see flowsheet. Improved to 126/59 when returned to supine. Pt required 2L O2 to maintain sats 90-92%, dropping on RA to 87-90%, cues for PLB throughotu session     Barriers to return to prior living situation: endurance, assist for mobility, strength, balance   Recommendations for discharge: TCU  Rationale for recommendations: To improve functional mobility as pt below baseline at this time        Entered by: Chantal Becerril 03/27/2018 4:25 PM            Spontaneous vertex

## 2019-05-20 ENCOUNTER — OFFICE VISIT (OUTPATIENT)
Dept: RADIATION ONCOLOGY | Facility: CLINIC | Age: 79
End: 2019-05-20
Attending: RADIOLOGY
Payer: MEDICARE

## 2019-05-20 VITALS — WEIGHT: 117 LBS | SYSTOLIC BLOOD PRESSURE: 172 MMHG | BODY MASS INDEX: 21.23 KG/M2 | DIASTOLIC BLOOD PRESSURE: 92 MMHG

## 2019-05-20 DIAGNOSIS — C21.1 MALIGNANT NEOPLASM OF ANAL CANAL (H): Primary | ICD-10-CM

## 2019-05-20 PROCEDURE — G0463 HOSPITAL OUTPT CLINIC VISIT: HCPCS | Performed by: NURSE PRACTITIONER

## 2019-05-20 NOTE — PROGRESS NOTES
Department of Radiation Oncology  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 Delivered from 2018 - 3/23/2018.    SYSTEMIC THERAPY:  Concurrent mitomycin-C and 5-FU    INTERVAL SINCE COMPLETION OF RADIATION THERAPY:   14 months    SUBJECTIVE:   Elizabeth Taylor is a 78 year old female with a PMH significant for a locally advanced squamous cell carcinoma of the anal canal diagnosed after she presented with progressive anal pain and small amounts of bright red blood per rectum. Staging evaluation revealed a 2.5 cm mass located approximately 1 cm from the anal verge with involvement of the internal anal sphincter in addition to two FDG-avid left inguinal nodes.    Ms. Taylor underwent treatment with definitive chemoradiotherapy with curative intent. The primary tumor was treated to a total dose of 54 Gy while the node-positive left groin received 50.4 Gy and the right groin and pelvis were treated to 45 Gy in 30 daily fractions using a simultaneous integrated boost technique. Radiotherapy was delivered with concurrent mitomycin-C and infusional 5-FU with mitomycin held during the second cycle of therapy due to the concern for treatment-related toxicities given her age and borderline functional status. Ms. Taylor tolerated the initiation of therapy relatively well though approximately midway through her treatment course did develop worsening diarrhea, eventually requiring inpatient admission during the final week of treatment.    Following completion of chemoradiotherapy, Ms. Taylor's post treatment course was complicated by a  mechanical fall with a resultant right femoral neck fracture requiring a right hip and knee hemiarthroplasty on 7/26/2018. She was additionally hospitalized from 11/1/2018 - 11/5/2018 after she presented to the ED with new-onset left hip pain and was found to have a pelvic ring fracture. She has been followed closely by Dr. Zaire Phan of orthopedic surgery and has been managed with conservative cares. She did see endocrinology for her osteoporosis but is trying diet and exercises.  She does not want to do medication.  She is not taking vitamin D and calcium as recommended.    With regards to her prior anal cancer, Ms. Taylor reports that she continues to slowly but progressively recover from her treatment-related toxicities. She denies any incontinence of the bowel or bladder however she does have persistent bowel urgency following meals. She also denies any pain with either bowel movements or urination nor does she report any bleeding per rectum. She is not using her dilator, but occasionally inserts her finger.  She recently saw GYN Onc for a visit and they were able to insert a speculum.  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 Her remaining ROS are otherwise mostly unchanged since her previous follow-up visit.       PHYSICAL EXAM:  BP (!) 172/92   Wt 53.1 kg (117 lb)   LMP  (LMP Unknown)   BMI 21.23 kg/m      Gen: Alert, in NAD  Pulm: No wheezing, stridor or respiratory distress  CV: Well-perfused, no cyanosis  Rectal: Marked hypo-and hyperpigmentation surrounding the anal verge. No desquamation or ulceration. Normal sphincter tone. No palpable masses within the anal verge/canal.  Musculoskeletal: Diffusely diminished muscle bulk with normal tone  Skin: Cutaneous changes surrounding the anus and bilateral groins consistent with her prior radiotherapy, otherwise normal color and turgor    LABS AND IMAGING:  Reviewed.    IMPRESSION:   Ms. Taylor is a 78 year old  female with a previous cT2 N1a M0 squamous cell carcinoma of the anal canal status post definitive chemoradiotherapy. Her post-treatment course has been complicated by a right femoral neck fracture and left pelvic fracture due in large part to her long-standing osteoporosis and pelvic radiotherapy. She has no evidence of disease on examination today.    PLAN:   1. Follow-up in radiation oncology clinic in 3 months with Dr. Estrada.  2. Continue follow-up with Dr. Rosas of colorectal surgery and Dr. Ray of medical oncology for ongoing disease surveillance  3. Continue follow-up with Dr. Phan of orthopedic surgery   4. Recommended continued follow up with endocrinology.  She refuses medication to treat her osteoporosis.  Recommended she take the calcium and Vit D.  5. Continue daily vaginal dilator use.    Joanie Gomez NP  Department of Radiation Oncology  [facility]

## 2019-05-20 NOTE — LETTER
2019       RE: Elizabeth Taylor  [address] [facility] [address]     Dear Colleague,    Thank you for referring your patient, Elizabeth Taylor, to the RADIATION ONCOLOGY CLINIC. Please see a copy of my visit note below.    FOLLOW-UP VISIT    Patient Name: Elizabeth Taylor      : 1940     Age: 78 year old        ______________________________________________________________________________     Chief Complaint   Patient presents with     Cancer     Pt is here for a follow up:Anal 5400 cGy completed on 3/23/18     LMP  (LMP Unknown)        Pain  Denies    Labs  Other Labs: No    Imaging  None          Other Appointments:     MD Name: Dr Rosas Appointment Date:    MD Name: Appointment Date:   MD Name: Appointment Date:   Other Appointment Notes:     Residual Radiation side effect: Concerns unrelated to treatment     Additional Instructions:     Nurse face-to-face time: Level 3:  10 min face to face time             Department of Radiation Oncology  [facility]  [address] [place] [address] [phone]       Radiation Oncology Follow-up Visit  May 20, 2019      Elizabeth Taylor  MRN: [id number]   : 1940     DISEASE TREATED:   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 Delivered from 2018 - 3/23/2018.    SYSTEMIC THERAPY:  Concurrent mitomycin-C and 5-FU    INTERVAL SINCE COMPLETION OF RADIATION THERAPY:   14 months    SUBJECTIVE:   Elizabeth Taylor is a 78 year old female with a PMH significant for a locally advanced squamous cell carcinoma of the anal canal diagnosed after she presented with progressive anal pain and small amounts of bright red blood per rectum. Staging evaluation revealed a 2.5 cm mass located approximately 1 cm from the anal verge with  involvement of the internal anal sphincter in addition to two FDG-avid left inguinal nodes.    Ms. Taylor underwent treatment with definitive chemoradiotherapy with curative intent. The primary tumor was treated to a total dose of 54 Gy while the node-positive left groin received 50.4 Gy and the right groin and pelvis were treated to 45 Gy in 30 daily fractions using a simultaneous integrated boost technique. Radiotherapy was delivered with concurrent mitomycin-C and infusional 5-FU with mitomycin held during the second cycle of therapy due to the concern for treatment-related toxicities given her age and borderline functional status. Ms. Taylor tolerated the initiation of therapy relatively well though approximately midway through her treatment course did develop worsening diarrhea, eventually requiring inpatient admission during the final week of treatment.    Following completion of chemoradiotherapy, Ms. Taylor's post treatment course was complicated by a mechanical fall with a resultant right femoral neck fracture requiring a right hip and knee hemiarthroplasty on 7/26/2018. She was additionally hospitalized from 11/1/2018 - 11/5/2018 after she presented to the ED with new-onset left hip pain and was found to have a pelvic ring fracture. She has been followed closely by Dr. Zaire Phan of orthopedic surgery and has been managed with conservative cares. She did see endocrinology for her osteoporosis but is trying diet and exercises.  She does not want to do medication.  She is not taking vitamin D and calcium as recommended.    With regards to her prior anal cancer, Ms. Taylor reports that she continues to slowly but progressively recover from her treatment-related toxicities. She denies any incontinence of the bowel or bladder however she does have persistent bowel urgency following meals. She also denies any pain with either bowel movements or urination nor does she report any bleeding per rectum. She  is not using her dilator, but occasionally inserts her finger.  She recently saw GYN Onc for a visit and they were able to insert a speculum.  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 Her remaining ROS are otherwise mostly unchanged since her previous follow-up visit.       PHYSICAL EXAM:  BP (!) 172/92   Wt 53.1 kg (117 lb)   LMP  (LMP Unknown)   BMI 21.23 kg/m       Gen: Alert, in NAD  Pulm: No wheezing, stridor or respiratory distress  CV: Well-perfused, no cyanosis  Rectal: Marked hypo-and hyperpigmentation surrounding the anal verge. No desquamation or ulceration. Normal sphincter tone. No palpable masses within the anal verge/canal.  Musculoskeletal: Diffusely diminished muscle bulk with normal tone  Skin: Cutaneous changes surrounding the anus and bilateral groins consistent with her prior radiotherapy, otherwise normal color and turgor    LABS AND IMAGING:  Reviewed.    IMPRESSION:   Ms. Taylor is a 78 year old female with a previous cT2 N1a M0 squamous cell carcinoma of the anal canal status post definitive chemoradiotherapy. Her post-treatment course has been complicated by a right femoral neck fracture and left pelvic fracture due in large part to her long-standing osteoporosis and pelvic radiotherapy. She has no evidence of disease on examination today.    PLAN:   1. Follow-up in radiation oncology clinic in 3 months with Dr. Estrada.  2. Continue follow-up with Dr. Rosas of colorectal surgery and Dr. Ray of medical oncology for ongoing disease surveillance  3. Continue follow-up with Dr. Phan of orthopedic surgery   4. Recommended continued follow up with endocrinology.  She refuses medication to treat her osteoporosis.  Recommended she take the calcium and Vit D.  5. Continue daily vaginal dilator use.    Joanie Gomez NP  Department of Radiation Oncology  [facility]

## 2019-05-20 NOTE — PROGRESS NOTES
FOLLOW-UP VISIT    Patient Name: Elizabeth Taylor      : 1940     Age: 78 year old        ______________________________________________________________________________     Chief Complaint   Patient presents with     Cancer     Pt is here for a follow up:Anal 5400 cGy completed on 3/23/18     LMP  (LMP Unknown)        Pain  Denies    Labs  Other Labs: No    Imaging  None          Other Appointments:     MD Name: Dr Rosas Appointment Date:    MD Name: Appointment Date:   MD Name: Appointment Date:   Other Appointment Notes:     Residual Radiation side effect: Concerns unrelated to treatment     Additional Instructions:     Nurse face-to-face time: Level 3:  10 min face to face time

## 2019-06-18 ENCOUNTER — PATIENT OUTREACH (OUTPATIENT)
Dept: CARE COORDINATION | Facility: CLINIC | Age: 79
End: 2019-06-18

## 2019-06-18 ENCOUNTER — PATIENT OUTREACH (OUTPATIENT)
Dept: SURGERY | Facility: CLINIC | Age: 79
End: 2019-06-18

## 2019-06-18 DIAGNOSIS — S72.001A CLOSED FRACTURE OF NECK OF RIGHT FEMUR, INITIAL ENCOUNTER (H): Primary | ICD-10-CM

## 2019-06-18 NOTE — PROGRESS NOTES
Clinic Care Coordination Contact  [facility]    Referral Source: SNF/TCU  Patient was hospitalized at [facility] 11/1-11/5/2018- with diagnosis of   Discharge Diagnoses      1. S/p ground level fall 2 weeks prior   2. Traumatic right femoral neck fracture s/p right hemiarthroplasty 7/26/18  3. Acute traumatic pain       Other diagnoses:  1. Stage III anal cancer s/p Chemo and radiation   2. Limited mobility 2/2 # 1   3. Recent catheter related wound due to perianal radiation   4. Hx of subacute right sided rib fractures 8-10 (dx 3/18)  5. Peripheral edema   Clinical Data: Care Coordinator Outreach  Outreach attempted x 1.  No answer  Plan: . Care Coordinator will try to reach patient again in 3-5 business days.  Quynh Mckeon RN, Care Coordinator   [facility] -Care Coordination  Liya Sousa  [phone]

## 2019-06-18 NOTE — PROGRESS NOTES
Called patient to discuss her needed imaging studies prior to her upcoming appointment with Dr. Rosas. Patient needs PET and MR 3T pelvis.     Contacted daughter, Katherine with times and date for PET/CT and MR 3T.

## 2019-06-19 DIAGNOSIS — C21.1 MALIGNANT NEOPLASM OF ANAL CANAL (H): Primary | ICD-10-CM

## 2019-06-20 NOTE — PROGRESS NOTES
Clinic Care Coordination Contact  [facility]/Voicemail    Referral Source: SNF/TCU  [employer] Utilization: Patient was hospitalized at [facility] 11/1-11/5/2018- with diagnosis of   Discharge Diagnoses      1. S/p ground level fall 2 weeks prior   2. Traumatic right femoral neck fracture s/p right hemiarthroplasty 7/26/18  3. Acute traumatic pain       Other diagnoses:  1. Stage III anal cancer s/p Chemo and radiation   2. Limited mobility 2/2 # 1   3. Recent catheter related wound due to perianal radiation   4. Hx of subacute right sided rib fractures 8-10 (dx 3/18)  Peripheral edema          Clinical Data: Care Coordinator Outreach  Outreach attempted x 2.  Left message on voicemail with call back information and requested return call.  Plan:  Care Coordinator will do no further outreaches at this time.  Quynh Mckeon RN, Care Coordinator   [facility] -Care Coordination  Liya Sousa  [phone]

## 2019-06-20 NOTE — PROGRESS NOTES
Colon and Rectal Surgery Follow-up Clinic Note      RE: Elizabeth Taylor  : 1940  SALBADOR: 18    DIAGNOSIS: mT2N1 anal canal squamous cell carcinoma (s/p CXRT [5,400 cGy], completed on 3/23/18).    INTERVAL HISTORY: Denies increased pain, fevers, or chills. Tolerating diet well. Having 3-4 mushy BMs per day. No BPR. Does have persistent fecal urgency and incontinence, mainly to liquid stool. Wears depends for this.      Physical Examination:  /85   Pulse 85   Temp 97.7  F (36.5  C)   Wt 120 lb 3.2 oz   LMP  (LMP Unknown)   SpO2 97%   BMI 21.81 kg/m     Abdomen soft, NT. No inguinal adenopathy palpated.  Perianal skin with no more excoriation. Perianal hyperpigmentation and induration. No evidence of active infection.  BRITTNEY: no lesions palpated.  Anoscopy: Was performed.   Hemorrhoids: No significant internal hemorrhoids.  Lesions: No. White scar visualized at left anterior aspect of anal canal.    ASSESSMENT  Positive response to CXRT.     CEA: 2.0.    CT AP 19:  1. No evidence of metastatic disease.  2. Evaluation of the patient's known anal cancer is limited on CT. If  further evaluation is warranted, consider PET/CT or MRI.  3. Age-indeterminate L3 superior endplate compression deformity which  is new from PET/CT 2018.  4. Heterogeneous sclerosis adjacent to the sacroiliac joints, left  greater than right, possibly post radiation changes.     MRI 19:  IMPRESSION: Susceptibility artifacts from right total hip arthroplasty  degrades imaging evaluation.  1. In this patient recent previously known left anal lesion there is  no convincing evidence for recurrent/residual mass.  2. No evidence for recurrent or metastatic disease in the pelvis.  3. Stable left ovarian cysts. Recommend monitoring at least on annual  basis (on routine anal cancer restaging follow-up or ultrasound pelvis  if at a later date restaging is no longer required).    PLAN  1. Schedule CT c/a/p pr CT/PET.  2.  RTC in 6 months for 3T MR pelvis and repeat anorectal exam with anoscopy.  3. Rest of surveillance per medical oncology.    Time spent: 30 minutes.  >50% spent in discussing, counseling and coordinating care.    Emir Rosas M.D., M.Sc.     Division of Colon and Rectal Surgery  [facility]    Referring Provider:  Felisha Davis NP  [address] [place] [address]     CC:  MD Zaire Mcleod MD

## 2019-06-21 ENCOUNTER — HOSPITAL ENCOUNTER (OUTPATIENT)
Dept: MRI IMAGING | Facility: CLINIC | Age: 79
Discharge: HOME | End: 2019-06-21
Attending: COLON & RECTAL SURGERY | Admitting: COLON & RECTAL SURGERY
Payer: MEDICARE

## 2019-06-21 DIAGNOSIS — C21.1 MALIGNANT NEOPLASM OF ANAL CANAL (H): ICD-10-CM

## 2019-06-21 LAB
CREAT BLD-MCNC: 0.5 MG/DL (ref 0.52–1.04)
GFR SERPL CREATININE-BSD FRML MDRD: >90 ML/MIN/{1.73_M2}

## 2019-06-21 PROCEDURE — 25500064 ZZH RX 255 OP 636: Performed by: COLON & RECTAL SURGERY

## 2019-06-21 PROCEDURE — 72197 MRI PELVIS W/O & W/DYE: CPT

## 2019-06-21 PROCEDURE — 82565 ASSAY OF CREATININE: CPT

## 2019-06-21 PROCEDURE — A9585 GADOBUTROL INJECTION: HCPCS | Performed by: COLON & RECTAL SURGERY

## 2019-06-21 RX ORDER — GADOBUTROL 604.72 MG/ML
7.5 INJECTION INTRAVENOUS ONCE
Status: COMPLETED | OUTPATIENT
Start: 2019-06-21 | End: 2019-06-21

## 2019-06-21 RX ADMIN — GADOBUTROL 5.5 ML: 604.72 INJECTION INTRAVENOUS at 16:25

## 2019-06-21 NOTE — PROGRESS NOTES
Clinic Care Coordination Contact  [facility]/Voicemail    Referral Source: SNF/TCU  Clinical Data: Care Coordinator Outreach  Outreach attempted x 3.  Left message on voicemail with call back information and requested return call.  Plan:  Care Coordinator will do no further outreaches at this time.  Quynh Mckeon RN, Care Coordinator   [facility] -Care Coordination  Liya Sousa  [phone]

## 2019-06-22 LAB — RADIOLOGIST FLAGS: NORMAL

## 2019-06-24 ENCOUNTER — ANCILLARY PROCEDURE (OUTPATIENT)
Dept: CT IMAGING | Facility: CLINIC | Age: 79
End: 2019-06-24
Attending: COLON & RECTAL SURGERY
Payer: MEDICARE

## 2019-06-24 ENCOUNTER — OFFICE VISIT (OUTPATIENT)
Dept: SURGERY | Facility: CLINIC | Age: 79
End: 2019-06-24
Payer: MEDICARE

## 2019-06-24 ENCOUNTER — PATIENT OUTREACH (OUTPATIENT)
Dept: GASTROENTEROLOGY | Facility: CLINIC | Age: 79
End: 2019-06-24

## 2019-06-24 VITALS
SYSTOLIC BLOOD PRESSURE: 131 MMHG | DIASTOLIC BLOOD PRESSURE: 85 MMHG | BODY MASS INDEX: 21.81 KG/M2 | TEMPERATURE: 97.7 F | WEIGHT: 120.2 LBS | HEART RATE: 85 BPM | OXYGEN SATURATION: 97 %

## 2019-06-24 DIAGNOSIS — C21.1 MALIGNANT NEOPLASM OF ANAL CANAL (H): Primary | ICD-10-CM

## 2019-06-24 DIAGNOSIS — C21.1 MALIGNANT NEOPLASM OF ANAL CANAL (H): ICD-10-CM

## 2019-06-24 RX ORDER — IOPAMIDOL 755 MG/ML
73 INJECTION, SOLUTION INTRAVASCULAR ONCE
Status: COMPLETED | OUTPATIENT
Start: 2019-06-24 | End: 2019-06-24

## 2019-06-24 RX ADMIN — IOPAMIDOL 73 ML: 755 INJECTION, SOLUTION INTRAVASCULAR at 16:22

## 2019-06-24 ASSESSMENT — PAIN SCALES - GENERAL: PAINLEVEL: MODERATE PAIN (4)

## 2019-06-24 NOTE — NURSING NOTE
Chief Complaint   Patient presents with     RECHECK     Follow up for anal cancer.       Vitals:    06/24/19 1449   BP: 131/85   Pulse: 85   Temp: 97.7  F (36.5  C)   SpO2: 97%   Weight: 120 lb 3.2 oz       Body mass index is 21.81 kg/m .                      Sherrie ZHENG MA

## 2019-06-24 NOTE — LETTER
2019       RE: Elizabeth Taylor  [address]  [facility] [address]     Dear Colleague,    Thank you for referring your patient, Elizabeth Taylor, to the [facility] COLON AND RECTAL SURGERY at [facility]. Please see a copy of my visit note below.    Colon and Rectal Surgery Follow-up Clinic Note    RE: Elizabeth Taylor  : 1940  SALBADOR: 18    DIAGNOSIS: mT2N1 anal canal squamous cell carcinoma (s/p CXRT [5,400 cGy], completed on 3/23/18).    INTERVAL HISTORY: Denies increased pain, fevers, or chills. Tolerating diet well. Having 3-4 mushy BMs per day. No BPR. Does have persistent fecal urgency and incontinence, mainly to liquid stool. Wears depends for this.      Physical Examination:  /85   Pulse 85   Temp 97.7  F (36.5  C)   Wt 120 lb 3.2 oz   LMP  (LMP Unknown)   SpO2 97%   BMI 21.81 kg/m      Abdomen soft, NT. No inguinal adenopathy palpated.  Perianal skin with no more excoriation. Perianal hyperpigmentation and induration. No evidence of active infection.  BRITTNEY: no lesions palpated.  Anoscopy: Was performed.   Hemorrhoids: No significant internal hemorrhoids.  Lesions: No. White scar visualized at left anterior aspect of anal canal.    ASSESSMENT  Positive response to CXRT.     CEA: 2.0.    CT AP 19:  1. No evidence of metastatic disease.  2. Evaluation of the patient's known anal cancer is limited on CT. If  further evaluation is warranted, consider PET/CT or MRI.  3. Age-indeterminate L3 superior endplate compression deformity which  is new from PET/CT 2018.  4. Heterogeneous sclerosis adjacent to the sacroiliac joints, left  greater than right, possibly post radiation changes.     MRI 19:  IMPRESSION: Susceptibility artifacts from right total hip arthroplasty  degrades imaging evaluation.  1. In this patient recent previously known left anal lesion there is  no convincing evidence for recurrent/residual  mass.  2. No evidence for recurrent or metastatic disease in the pelvis.  3. Stable left ovarian cysts. Recommend monitoring at least on annual  basis (on routine anal cancer restaging follow-up or ultrasound pelvis  if at a later date restaging is no longer required).    PLAN  1. Schedule CT c/a/p pr CT/PET.  2. RTC in 6 months for 3T MR pelvis and repeat anorectal exam with anoscopy.  3. Rest of surveillance per medical oncology.    Time spent: 30 minutes.  >50% spent in discussing, counseling and coordinating care.    Emir Rosas M.D., M.Sc.     Division of Colon and Rectal Surgery  [facility]    Referring Provider:  Felisha Davis, CHAYITO  [address] [place] [address]     CC:  MD Zaire Mcleod MD

## 2019-06-24 NOTE — PROGRESS NOTES
Received a call from imaging with incidental findng report. Pt has stable left ovarian cyst and recommend one follow up and or ultrasound. . Will route to colon and rectal RN care coordinator.

## 2019-06-24 NOTE — DISCHARGE INSTRUCTIONS

## 2019-06-25 ENCOUNTER — OFFICE VISIT (OUTPATIENT)
Dept: FAMILY MEDICINE | Facility: CLINIC | Age: 79
End: 2019-06-25
Payer: MEDICARE

## 2019-06-25 VITALS
WEIGHT: 117.6 LBS | DIASTOLIC BLOOD PRESSURE: 62 MMHG | HEART RATE: 64 BPM | TEMPERATURE: 98.3 F | SYSTOLIC BLOOD PRESSURE: 106 MMHG | RESPIRATION RATE: 16 BRPM | BODY MASS INDEX: 21.34 KG/M2

## 2019-06-25 DIAGNOSIS — E44.1 MILD PROTEIN-CALORIE MALNUTRITION (H): ICD-10-CM

## 2019-06-25 DIAGNOSIS — M25.551 HIP PAIN, RIGHT: ICD-10-CM

## 2019-06-25 DIAGNOSIS — K02.9 DENTAL CARIES: ICD-10-CM

## 2019-06-25 DIAGNOSIS — I89.0 LYMPHEDEMA: ICD-10-CM

## 2019-06-25 DIAGNOSIS — L71.9 ROSACEA: Primary | ICD-10-CM

## 2019-06-25 PROCEDURE — 99214 OFFICE O/P EST MOD 30 MIN: CPT | Performed by: FAMILY MEDICINE

## 2019-06-25 RX ORDER — METRONIDAZOLE 7.5 MG/G
GEL TOPICAL 2 TIMES DAILY
Qty: 45 G | Refills: 3 | Status: SHIPPED | OUTPATIENT
Start: 2019-06-25 | End: 2021-10-05

## 2019-06-25 NOTE — PROGRESS NOTES
Subjective     Elizabeth Taylor is a 78 year old female who presents to clinic today for the following health issues:    HPI   Musculoskeletal problem/pain follow up       Duration: 2 months    Description  Location: left foot/ leg pain    Intensity:  moderate    Accompanying signs and symptoms: swelling    History  Previous similar problem: YES ongoing for some time  Previous evaluation:  none    Precipitating or alleviating factors:  Trauma or overuse: no   Aggravating factors include: none    Therapies tried and outcome: rest/inactivity, elevation, massage, support hose    Tooth Pain      Duration: today    Description (location/character/radiation): right side     Intensity:  moderate    Accompanying signs and symptoms: none    History (similar episodes/previous evaluation): has had root canal on tooth in past    Precipitating or alleviating factors: was worse during chemo therapy in past    Therapies tried and outcome: tylenol       3: rash on her face. New over the pat few months but has had similar rash before. occ gets pustules. Tried viegar on it without relief.    4: swelling of her legs. Compression helps. Elevation helps some. What more can be doen. Feels heavy but no particular pain outside of the hip pain on the right     med hx, family hx, and soc hx reviewed and updated     OBJECTIVE: /62 (BP Location: Right arm, Patient Position: Chair, Cuff Size: Adult Regular)   Pulse 64   Temp 98.3  F (36.8  C) (Oral)   Resp 16   Wt 53.3 kg (117 lb 9.6 oz)   LMP  (LMP Unknown)   BMI 21.34 kg/m   no apparent distress   Exam:  GENERAL APPEARANCE: healthy, alert and no distress  HENT: ear canals and TM's normal, nose and mouth without ulcers or lesions and missing cap on the last molar on the right lower side.   NECK: no adenopathy, no asymmetry, masses, or scars and thyroid normal to palpation  RESP: lungs clear to auscultation - no rales, rhonchi or wheezes  CV: regular rates and rhythm, normal S1  S2, no S3 or S4 and no murmur, click or rub -  ABDOMEN:  soft, nontender, no HSM or masses and bowel sounds normal  MS: swelling bilateral lower extremities. Non pitting. Significantly worse on the right   SKIN: erythematous papules bilateral cheeks  PSYCH: mentation appears normal and affect normal/bright     1. Rosacea  Discussed triggers. educational info given.   - metroNIDAZOLE (METROGEL) 0.75 % external gel; Apply topically 2 times daily  Dispense: 45 g; Refill: 3    2. Lymphedema  Possibly related to her cancer and radiation.   - LYMPHEDEMA THERAPY REFERRAL; Future    3. Mild protein-calorie malnutrition (H)  This is improving.     4. Dental caries  Recommended dental evaluation    5. Hip pain, right  Continue the neurontin at night.

## 2019-06-25 NOTE — PATIENT INSTRUCTIONS
Patient Education     Rosacea  Rosacea is a long-lasting (chronic) skin condition affecting the face. In the early stages it causes easy flushing or blushing. Redness may become long-term (permanent) as the small blood vessels of the face widen (dilate). There may be small, red, pus-filled bumps (pustules). It looks like a bad case of acne, and has been called adult acne. But it is not caused by the same things that cause acne.  Rosacea is a chronic illness. You will have flare-ups that come and go. This may happen every few weeks or every few months. Experts don't know what causes rosacea. If not treated, it tends to get worse over time.  Some men with a more severe form of rosacea develop a condition called rhinophyma. The oil glands on the skin of the nose become blocked and the nose gets bigger. The cheeks also become puffy. Alcohol may increase the flushing. But this condition is not caused by alcohol use.  Rosacea can be treated with topical gels and creams. Oral antibiotics are used for more severe cases. You should see improvement in the first 4 weeks. Dilated blood vessels can be treated with a small electric needle or laser surgery. Rhinophyma can be treated with surgery. Or it may be treated by surgically scraping the skin (dermabrasion).  Home care    Avoid things that make your face red or flushed. These include hot drinks, spicy foods, caffeine, and alcohol.    Exercise indoors or in a cool area to avoid getting overheated.    Avoid excess sun exposure. Use a sunscreen with at least SPF 15 or higher.    Avoid extreme hot or extreme cold weather.    Don't scrub your face. That will irritate the skin and increase redness.    Avoid harsh soaps or moisturizers with irritating ingredients. You may need to use hypoallergenic cosmetics.    Avoid over-the-counter treatments unless instructed by your healthcare provider. Some of these treatments may make rosacea worse.    Try to figure out what triggers your  flares (such as stress, sun exposure, or certain foods).  Follow-up care  Follow up with your healthcare provider, or as advised. Contact your provider if your condition is not responding to the medicines you were given. Getting treatment early can stop it from getting worse.  When to seek medical advice  Call your healthcare provider right away if you have redness, burning, or a gritty feeling in your eyes.  Date Last Reviewed: 8/1/2016 2000-2018 [employer] [address] [place] [id number]. All rights reserved. This information is not intended as a substitute for professional medical care. Always follow your healthcare professional's instructions.

## 2019-06-26 ENCOUNTER — TELEPHONE (OUTPATIENT)
Dept: OBGYN | Facility: CLINIC | Age: 79
End: 2019-06-26

## 2019-06-26 NOTE — TELEPHONE ENCOUNTER
Spoke with Katherine, at this time did not want to sched appoinment, did not have anything available for 7/9/19.

## 2019-06-28 ENCOUNTER — TELEPHONE (OUTPATIENT)
Dept: FAMILY MEDICINE | Facility: CLINIC | Age: 79
End: 2019-06-28

## 2019-06-28 NOTE — TELEPHONE ENCOUNTER
Reason for Call:  Other call back    Detailed comments: pt called saying provider was supposed to follow up with her on putting in some referrals for different specialty providers and they werent in the system yet. She was hoping someone could give her a call back to discuss.    Phone Number Patient can be reached at: Home number on file [phone] (home)    Best Time: anytime.    Can we leave a detailed message on this number? YES    Call taken on 6/28/2019 at 5:55 PM by Pily Rubi

## 2019-07-01 ENCOUNTER — NURSE TRIAGE (OUTPATIENT)
Dept: NURSING | Facility: CLINIC | Age: 79
End: 2019-07-01

## 2019-07-01 NOTE — TELEPHONE ENCOUNTER
"Patient reports, \"My left side starting with my foot there is swelling of my ankle, up into my calf and shin. My whole shin area seems so huge compared to my right side.\" Patient noticed swelling about 3 months ago. No redness. No fever. Patient has been elevating leg, ankle has a bruise which is fading. Has some left leg spasms.     Triage guidelines recommend to be seen within 4 hours. Patient declined. She wants to see her PCP. Transferred to scheduling.    Lucia Echavarria RN/[facility]    Reason for Disposition    [1] Thigh, calf, or ankle swelling AND [2] only 1 side    Additional Information    Negative: Severe difficulty breathing (e.g., struggling for each breath, speaks in single words)    Negative: Looks like a broken bone or dislocated joint (e.g., crooked or deformed)    Negative: Sounds like a life-threatening emergency to the triager    Negative: Difficulty breathing at rest    Negative: Entire foot is cool or blue in comparison to other side    Negative: [1] Can't walk or can barely walk AND [2] new onset    Negative: [1] Difficulty breathing with exertion (e.g., walking) AND [2] new onset or worsening    Negative: [1] Red area or streak AND [2] fever    Negative: [1] Swelling is painful to touch AND [2] fever    Negative: Patient sounds very sick or weak to the triager    Negative: [1] Cast on leg or ankle AND [2] now increased pain    Negative: SEVERE leg swelling (e.g., swelling extends above knee, entire leg is swollen, weeping fluid)    Negative: [1] Red area or streak [2] large (> 2 in. or 5 cm)    Negative: [1] Thigh or calf pain AND [2] only 1 side AND [3] present > 1 hour    Protocols used: LEG SWELLING AND EDEMA-A-AH      "

## 2019-07-03 DIAGNOSIS — M25.552 HIP PAIN, LEFT: ICD-10-CM

## 2019-07-03 RX ORDER — GABAPENTIN 100 MG/1
100-300 CAPSULE ORAL
Qty: 90 CAPSULE | Refills: 3 | Status: SHIPPED | OUTPATIENT
Start: 2019-07-03 | End: 2019-12-03

## 2019-07-03 NOTE — TELEPHONE ENCOUNTER
Reason for Call:  Medication or medication refill:    Do you use a [facility]?  Name of the pharmacy and phone number for the current request:  [facility] - [facility] - [phone]    Name of the medication requested: Gabapentin    Other request: patient is completely out is asking to get filled today    Can we leave a detailed message on this number? YES and it is also ok to call Katherine she is her emergency  and she is listed as ok to communicate with and her number is [phone]    Phone number patient can be reached at: Home number on file [phone] (home)    Best Time: any    Call taken on 7/3/2019 at 3:12 PM by Kira Pruitt

## 2019-07-05 DIAGNOSIS — M25.551 RIGHT HIP PAIN: Primary | ICD-10-CM

## 2019-07-09 ENCOUNTER — ANCILLARY PROCEDURE (OUTPATIENT)
Dept: GENERAL RADIOLOGY | Facility: CLINIC | Age: 79
End: 2019-07-09
Attending: ORTHOPAEDIC SURGERY
Payer: MEDICARE

## 2019-07-09 ENCOUNTER — OFFICE VISIT (OUTPATIENT)
Dept: ORTHOPEDICS | Facility: CLINIC | Age: 79
End: 2019-07-09
Payer: MEDICARE

## 2019-07-09 VITALS — BODY MASS INDEX: 21.23 KG/M2 | WEIGHT: 117 LBS

## 2019-07-09 DIAGNOSIS — Z96.641 HISTORY OF TOTAL RIGHT HIP REPLACEMENT: Primary | ICD-10-CM

## 2019-07-09 ASSESSMENT — ENCOUNTER SYMPTOMS
EYE PAIN: 0
DISTURBANCES IN COORDINATION: 0
EYE REDNESS: 0
EYE WATERING: 1
NAIL CHANGES: 1
STIFFNESS: 1
SPEECH CHANGE: 1
MEMORY LOSS: 1
LIGHT-HEADEDNESS: 0
ARTHRALGIAS: 1
HYPOTENSION: 0
BACK PAIN: 0
ORTHOPNEA: 0
MUSCLE CRAMPS: 1
HYPERTENSION: 0
SKIN CHANGES: 1
SYNCOPE: 0
TINGLING: 0
MYALGIAS: 1
WEAKNESS: 1
LEG PAIN: 0
SLEEP DISTURBANCES DUE TO BREATHING: 0
NECK PAIN: 0
PALPITATIONS: 0
LOSS OF CONSCIOUSNESS: 0
SEIZURES: 0
TREMORS: 1
MUSCLE WEAKNESS: 0
HEADACHES: 0
POOR WOUND HEALING: 0
EXERCISE INTOLERANCE: 0
DOUBLE VISION: 0
DIZZINESS: 1
JOINT SWELLING: 1

## 2019-07-09 NOTE — NURSING NOTE
Reason For Visit:   Chief Complaint   Patient presents with     Surgical Followup     follow up right hip hemiarthroplasty    Left shoulder pain as well     Wt 53.1 kg (117 lb)   LMP  (LMP Unknown)   BMI 21.23 kg/m      Pain Assessment  Patient Currently in Pain: Yes  0-10 Pain Scale: 5(whole left leg pain )    Doron Paz ATC

## 2019-07-09 NOTE — PROGRESS NOTES
Spine Surgery Return Clinic Visit      Chief Complaint:   Surgical Followup (follow up right hip hemiarthroplasty )      Interval HPI:  Symptom Profile Including: location of symptoms, onset, severity, exacerbating/alleviating factors, previous treatments:        Elizabeth Taylor is a 78 year old female who returns s/p right hip hemiarthroplasty.      He is doing quite well today.  She has no hip pain.  She has a number of questions about how much range of motion she can try to get while doing yoga.            Past Medical History:     Past Medical History:   Diagnosis Date     Anal cancer (H)      Endometriosis, site unspecified      Thyroiditis, unspecified     Thryoiditis-resolved            Past Surgical History:     Past Surgical History:   Procedure Laterality Date     APPENDECTOMY      as a child     ARTHROPLASTY HIP Right 7/26/2018    Procedure: ARTHROPLASTY HIP;  Right Hip Hemiarthroplasty;  Surgeon: Zaire Phan MD;  Location: [facility]     COLONOSCOPY N/A 4/13/2017    Procedure: COLONOSCOPY;  Surgeon: Juan Dos Santos MD;  Location: [facility] GI     INSERT PORT VASCULAR ACCESS Right 2/8/2018    Procedure: INSERT PORT VASCULAR ACCESS;  Place Single Lumen Venous Chest Port Placement;  Surgeon: Zaire Moreira PA-C;  Location: [facility] OR     SURGICAL HISTORY OF -       endometriosis            Social History:     Social History     Tobacco Use     Smoking status: Never Smoker     Smokeless tobacco: Never Used   Substance Use Topics     Alcohol use: No            Family History:     Family History   Problem Relation Age of Onset     Cancer Sister      Cancer Maternal Grandmother         lymphoma     Heart Disease Father         MI     Uterine Cancer Niece             Allergies:     Allergies   Allergen Reactions     Darvon [Propoxyphene]      Had reaction in teen years     Ketoconazole Rash     Penicillins      As a child            Medications:     Current Outpatient Medications    Medication     acetaminophen (TYLENOL) 500 MG tablet     Calcium-Magnesium-Zinc 333-133-5 MG TABS per tablet     cholecalciferol (VITAMIN D3) 5000 units (125 mcg) capsule     docusate sodium (COLACE) 100 MG capsule     gabapentin (NEURONTIN) 100 MG capsule     metroNIDAZOLE (METROGEL) 0.75 % external gel     vitamin B complex with vitamin C (VITAMIN  B COMPLEX) TABS tablet     VITAMIN E PO     No current facility-administered medications for this visit.              Review of Systems:   A focused musculoskeletal and neurologic ROS was performed with pertinent positives and negatives noted in the HPI.  Additional systems were also reviewed and are documented at the bottom of the note.         Physical Exam:   Vitals: Wt 53.1 kg (117 lb)   LMP  (LMP Unknown)   BMI 21.23 kg/m    Musculoskeletal, Neurologic, and Spine:   Neurovascular intact in the foot 5 out of 5 dorsi and plantar flexion with warm well-perfused foot intact dorsal pedal pulse.  Incision is well-healed.         Imaging:   We ordered and independently reviewed new radiographs at this clinic visit. The results were discussed with the patient. Findings include:     July 9, 2019 AP and lateral right hip films show unchanged position of her implants with no obvious interval complication       Assessment and Plan:     78 year old female with good clinical result at this point status post hemiarthroplasty for hip fracture.  She can continue to ambulate as tolerated.  I asked her to avoid any excessive hip flexion and internal rotation that might risk dislocating the prosthesis.  Otherwise I will plan to see her back again in 1 year with repeat pelvis and right hip lateral radiographs       F/U 1-2 years for radiographic surveilence.      Lumbar PT for low back pain symptoms.      Respectfully,  Zaire Phan MD  Spine Surgery  [facility]    Answers for HPI/ROS submitted by the patient on 7/9/2019   General Symptoms: No  Skin Symptoms:  Yes  HENT Symptoms: No  EYE SYMPTOMS: Yes  HEART SYMPTOMS: Yes  LUNG SYMPTOMS: No  INTESTINAL SYMPTOMS: No  URINARY SYMPTOMS: No  GYNECOLOGIC SYMPTOMS: No  BREAST SYMPTOMS: No  SKELETAL SYMPTOMS: Yes  BLOOD SYMPTOMS: No  NERVOUS SYSTEM SYMPTOMS: Yes  MENTAL HEALTH SYMPTOMS: No  Changes in hair: Yes  Changes in moles/birth marks: Yes  Itching: Yes  Rashes: No  Changes in nails: Yes  Acne: Yes  Hair in places you don't want it: No  Change in facial hair: No  Warts: No  Non-healing sores: No  Scarring: No  Eye pain: No  Vision loss: No  Dry eyes: Yes  Watery eyes: Yes  Eye bulging: No  Double vision: No  Flashing of lights: No  Spots: No  Floaters: No  Redness: No  Crossed eyes: No  Chest pain or pressure: No  Fast or irregular heartbeat: No  Pain in legs with walking: No  Trouble breathing while lying down: No  Fingers or toes appear blue: No  High blood pressure: No  Low blood pressure: No  Fainting: No  Murmurs: No  Pacemaker: No  Varicose veins: No  Edema or swelling: Yes  Wake up at night with shortness of breath: No  Light-headedness: No  Exercise intolerance: No  Back pain: No  Muscle aches: Yes  Neck pain: No  Swollen joints: Yes  Joint pain: Yes  Bone pain: No  Muscle cramps: Yes  Muscle weakness: No  Joint stiffness: Yes  Bone fracture: No  Trouble with coordination: No  Dizziness or trouble with balance: Yes  Fainting or black-out spells: No  Memory loss: Yes  Headache: No  Seizures: No  Speech problems: Yes  Tingling: No  Tremor: Yes  Weakness: Yes  Difficulty walking: No

## 2019-07-09 NOTE — LETTER
7/9/2019       RE: Elizabeth Taylor  [address]  [place] [address]     Dear Colleague,    Thank you for referring your patient, Elizabeth Taylor, to the [facility]. Please see a copy of my visit note below.    Spine Surgery Return Clinic Visit      Chief Complaint:   Surgical Followup (follow up right hip hemiarthroplasty )      Interval HPI:  Symptom Profile Including: location of symptoms, onset, severity, exacerbating/alleviating factors, previous treatments:        Elizabeth Taylor is a 78 year old female who returns s/p right hip hemiarthroplasty.      He is doing quite well today.  She has no hip pain.  She has a number of questions about how much range of motion she can try to get while doing yoga.            Past Medical History:     Past Medical History:   Diagnosis Date     Anal cancer (H)      Endometriosis, site unspecified      Thyroiditis, unspecified     Thryoiditis-resolved            Past Surgical History:     Past Surgical History:   Procedure Laterality Date     APPENDECTOMY      as a child     ARTHROPLASTY HIP Right 7/26/2018    Procedure: ARTHROPLASTY HIP;  Right Hip Hemiarthroplasty;  Surgeon: Zaire Phan MD;  Location: [facility]     COLONOSCOPY N/A 4/13/2017    Procedure: COLONOSCOPY;  Surgeon: Juan Dos Santos MD;  Location: [facility]     INSERT PORT VASCULAR ACCESS Right 2/8/2018    Procedure: INSERT PORT VASCULAR ACCESS;  Place Single Lumen Venous Chest Port Placement;  Surgeon: Zaire Moreira PA-C;  Location: [facility]     SURGICAL HISTORY OF -       endometriosis            Social History:     Social History     Tobacco Use     Smoking status: Never Smoker     Smokeless tobacco: Never Used   Substance Use Topics     Alcohol use: No            Family History:     Family History   Problem Relation Age of Onset     Cancer Sister      Cancer Maternal Grandmother         lymphoma      Heart Disease Father         MI     Uterine Cancer Niece             Allergies:     Allergies   Allergen Reactions     Darvon [Propoxyphene]      Had reaction in teen years     Ketoconazole Rash     Penicillins      As a child            Medications:     Current Outpatient Medications   Medication     acetaminophen (TYLENOL) 500 MG tablet     Calcium-Magnesium-Zinc 333-133-5 MG TABS per tablet     cholecalciferol (VITAMIN D3) 5000 units (125 mcg) capsule     docusate sodium (COLACE) 100 MG capsule     gabapentin (NEURONTIN) 100 MG capsule     metroNIDAZOLE (METROGEL) 0.75 % external gel     vitamin B complex with vitamin C (VITAMIN  B COMPLEX) TABS tablet     VITAMIN E PO     No current facility-administered medications for this visit.              Review of Systems:   A focused musculoskeletal and neurologic ROS was performed with pertinent positives and negatives noted in the HPI.  Additional systems were also reviewed and are documented at the bottom of the note.         Physical Exam:   Vitals: Wt 53.1 kg (117 lb)   LMP  (LMP Unknown)   BMI 21.23 kg/m     Musculoskeletal, Neurologic, and Spine:   Neurovascular intact in the foot 5 out of 5 dorsi and plantar flexion with warm well-perfused foot intact dorsal pedal pulse.  Incision is well-healed.         Imaging:   We ordered and independently reviewed new radiographs at this clinic visit. The results were discussed with the patient. Findings include:     July 9, 2019 AP and lateral right hip films show unchanged position of her implants with no obvious interval complication       Assessment and Plan:     78 year old female with good clinical result at this point status post hemiarthroplasty for hip fracture.  She can continue to ambulate as tolerated.  I asked her to avoid any excessive hip flexion and internal rotation that might risk dislocating the prosthesis.  Otherwise I will plan to see her back again in 1 year with repeat pelvis and right hip  lateral radiographs       F/U 1-2 years for radiographic surveilence.      Lumbar PT for low back pain symptoms.      Respectfully,  Zaire Phan MD  Spine Surgery  [facility]

## 2019-08-01 ENCOUNTER — THERAPY VISIT (OUTPATIENT)
Dept: PHYSICAL THERAPY | Facility: CLINIC | Age: 79
End: 2019-08-01
Payer: MEDICARE

## 2019-08-01 DIAGNOSIS — Z96.641 HISTORY OF TOTAL RIGHT HIP REPLACEMENT: ICD-10-CM

## 2019-08-01 DIAGNOSIS — M54.50 BILATERAL LOW BACK PAIN WITHOUT SCIATICA: ICD-10-CM

## 2019-08-01 PROCEDURE — 97161 PT EVAL LOW COMPLEX 20 MIN: CPT | Mod: GP | Performed by: PHYSICAL THERAPIST

## 2019-08-01 PROCEDURE — 97110 THERAPEUTIC EXERCISES: CPT | Mod: GP | Performed by: PHYSICAL THERAPIST

## 2019-08-01 NOTE — PROGRESS NOTES
"[facility] Initial Evaluation    Subjective:  Pt presents to PT with a chief complaint of LBP with a previous history of R JENIFFER on 7/26/18. Pt reports having issues w/ feeling \"off balance\" when walking and that her left leg seems longer than her R.     Type of problem:  Lumbar   Condition occurred with:  Degenerative joint disease. This is a chronic condition    Patient reports pain:  Lower lumbar spine. Radiates to:  No radiation. Associated symptoms:  Fatigue, loss of balance, loss of strength and loss of motion/stiffness. Symptoms are exacerbated by standing, lifting and walking and relieved by rest.                      Objective:    Gait:  Lateral trunk lean L     Gait Type:  Antalgic                    Lumbar/SI Evaluation  ROM:    AROM Lumbar:   Flexion:          Mod loss  Ext:                    Major loss, pain +   Side Bend:        Left:     Right:   Rotation:           Left:  Mod loss    Right:  Mod loss   Side Glide:        Left:     Right:           Lumbar Myotomes:  normal                Lumbar Dermtomes:  normal                                                            Hip Evaluation    Hip Strength:      Extension:  Left: 3/5  Pain:Right: 3/5    Pain:    Abduction:  Left: 3-/5     Pain:Right: 3-/5    Pain:                                 General     ROS    Assessment/Plan:    Patient is a 79 year old female with lumbar and left side hip complaints.    Patient has the following significant findings with corresponding treatment plan.                Diagnosis 1:  LBP  Pain -  manual therapy, self management and education  Decreased ROM/flexibility - manual therapy and therapeutic exercise  Decreased strength - therapeutic exercise and therapeutic activities  Impaired gait - gait training  Impaired muscle performance - neuro re-education    Therapy Evaluation Codes:   1) History comprised of:   Personal factors that impact the plan of care:      Age, Cognition and Overall behavior " pattern.    Comorbidity factors that impact the plan of care are:      Cancer.     Medications impacting care: None.  2) Examination of Body Systems comprised of:   Body structures and functions that impact the plan of care:      Hip and Lumbar spine.   Activity limitations that impact the plan of care are:      Bathing, Lifting, Squatting/kneeling, Stairs, Standing and Walking.  3) Clinical presentation characteristics are:   Evolving/Changing.  4) Decision-Making    Moderate complexity using standardized patient assessment instrument and/or measureable assessment of functional outcome.  Cumulative Therapy Evaluation is: Moderate complexity.    Previous and current functional limitations:  (See Goal Flow Sheet for this information)    Short term and Long term goals: (See Goal Flow Sheet for this information)     Communication ability:  Patient appears to be able to clearly communicate and understand verbal and written communication and follow directions correctly.  Treatment Explanation - The following has been discussed with the patient:   RX ordered/plan of care  Anticipated outcomes  Possible risks and side effects  This patient would benefit from PT intervention to resume normal activities.   Rehab potential is good.    Frequency:  1 X week, once daily  Duration:  for 8 weeks  Discharge Plan:  Achieve all LTG.  Independent in home treatment program.  Reach maximal therapeutic benefit.    Please refer to the daily flowsheet for treatment today, total treatment time and time spent performing 1:1 timed codes.

## 2019-08-01 NOTE — LETTER
"DEPARTMENT OF HEALTH AND HUMAN SERVICES  CENTERS FOR MEDICARE & MEDICAID SERVICES    PLAN/UPDATED PLAN OF PROGRESS FOR OUTPATIENT REHABILITATION    PATIENTS NAME:  Elizabeth Taylor   : 1940  PROVIDER NUMBER:    [id number]  HICN:  [id number]   PROVIDER NAME: [facility] PHYSICAL [facility]ERAPY  MEDICAL RECORD NUMBER: [id number]   START OF CARE DATE:  SOC Date: 19   TYPE:  PT  PRIMARY/TREATMENT DIAGNOSIS: (Pertinent Medical Diagnosis)  History of total right hip replacement  Bilateral low back pain without sciatica    VISITS FROM START OF CARE:  Rxs Used: 1     [facility] Initial Evaluation    Subjective:  Pt presents to PT with a chief complaint of LBP with a previous history of R JENIFFER on 18. Pt reports having issues w/ feeling \"off balance\" when walking and that her left leg seems longer than her R.     Type of problem:  Lumbar   Condition occurred with:  Degenerative joint disease. This is a chronic condition    Patient reports pain:  Lower lumbar spine. Radiates to:  No radiation. Associated symptoms:  Fatigue, loss of balance, loss of strength and loss of motion/stiffness. Symptoms are exacerbated by standing, lifting and walking and relieved by rest.    Objective:    Gait:  Lateral trunk lean L     Gait Type:  Antalgic       Lumbar/SI Evaluation  ROM:    AROM Lumbar:   Flexion:          Mod loss  Ext:                    Major loss, pain +   Side Bend:        Left:     Right:   Rotation:           Left:  Mod loss    Right:  Mod loss   Side Glide:        Left:     Right:         Lumbar Myotomes:  normal    Lumbar Dermtomes:  normal             PATIENTS NAME:  Elizabeth Taylor   : 1940      Hip Evaluation    Hip Strength:    Extension:  Left: 3/5  Pain:Right: 3/5    Pain:    Abduction:  Left: 3-/5     Pain:Right: 3-/5    Pain:    Assessment/Plan:    Patient is a 79 year old female with lumbar and left side hip complaints.    Patient has the " following significant findings with corresponding treatment plan.                Diagnosis 1:  LBP  Pain -  manual therapy, self management and education  Decreased ROM/flexibility - manual therapy and therapeutic exercise  Decreased strength - therapeutic exercise and therapeutic activities  Impaired gait - gait training  Impaired muscle performance - neuro re-education    Therapy Evaluation Codes:   1) History comprised of:   Personal factors that impact the plan of care:      Age, Cognition and Overall behavior pattern.    Comorbidity factors that impact the plan of care are:      Cancer.     Medications impacting care: None.  2) Examination of Body Systems comprised of:   Body structures and functions that impact the plan of care:      Hip and Lumbar spine.   Activity limitations that impact the plan of care are:      Bathing, Lifting, Squatting/kneeling, Stairs, Standing and Walking.  3) Clinical presentation characteristics are:   Evolving/Changing.  4) Decision-Making    Moderate complexity using standardized patient assessment instrument  and/or measureable assessment of functional outcome.                                      PATIENTS NAME:  Elizabeth Taylor   : 1940        Cumulative Therapy Evaluation is: Moderate complexity.    Previous and current functional limitations:  (See Goal Flow Sheet for this information)    Short term and Long term goals: (See Goal Flow Sheet for this information)     Communication ability:  Patient appears to be able to clearly communicate and understand verbal and written communication and follow directions correctly.  Treatment Explanation - The following has been discussed with the patient:   RX ordered/plan of care  Anticipated outcomes  Possible risks and side effects  This patient would benefit from PT intervention to resume normal activities.   Rehab potential is good.    Frequency:  1 X week, once daily  Duration:  for 8 weeks  Discharge Plan:  Achieve all  "LTG.  Independent in home treatment program.  Reach maximal therapeutic benefit.    Please refer to the daily flowsheet for treatment today, total treatment time and time spent performing 1:1 timed codes.             Caregiver Signature/Credentials _____________________________ Date ________        Jorge Monsalve DPT   I have reviewed and certified the need for these services and plan of treatment while under my care.            PHYSICIAN'S SIGNATURE:   _____________________________________  Date___________     Zaire Phan MD    Certification period:  Beginning of Cert date period: 08/01/19 to  End of Cert period date: 10/21/19     Functional Level Progress Report: Please see attached \"Goal Flow sheet for Functional level.\"    ____X____ Continue Services or       ________ DC Services                Service dates: From  SOC Date: 08/01/19 date to present    "

## 2019-08-02 PROBLEM — M54.50 BILATERAL LOW BACK PAIN WITHOUT SCIATICA: Status: ACTIVE | Noted: 2019-08-02

## 2019-08-08 ENCOUNTER — THERAPY VISIT (OUTPATIENT)
Dept: PHYSICAL THERAPY | Facility: CLINIC | Age: 79
End: 2019-08-08
Payer: MEDICARE

## 2019-08-08 DIAGNOSIS — M54.50 BILATERAL LOW BACK PAIN WITHOUT SCIATICA: ICD-10-CM

## 2019-08-08 PROCEDURE — 97112 NEUROMUSCULAR REEDUCATION: CPT | Mod: GP | Performed by: PHYSICAL THERAPIST

## 2019-08-08 PROCEDURE — 97110 THERAPEUTIC EXERCISES: CPT | Mod: GP | Performed by: PHYSICAL THERAPIST

## 2019-08-13 ENCOUNTER — THERAPY VISIT (OUTPATIENT)
Dept: PHYSICAL THERAPY | Facility: CLINIC | Age: 79
End: 2019-08-13
Payer: MEDICARE

## 2019-08-13 DIAGNOSIS — M54.50 BILATERAL LOW BACK PAIN WITHOUT SCIATICA: ICD-10-CM

## 2019-08-13 PROCEDURE — 97110 THERAPEUTIC EXERCISES: CPT | Mod: GP | Performed by: PHYSICAL THERAPIST

## 2019-08-13 PROCEDURE — 97112 NEUROMUSCULAR REEDUCATION: CPT | Mod: GP | Performed by: PHYSICAL THERAPIST

## 2019-08-20 ENCOUNTER — THERAPY VISIT (OUTPATIENT)
Dept: PHYSICAL THERAPY | Facility: CLINIC | Age: 79
End: 2019-08-20
Payer: MEDICARE

## 2019-08-20 DIAGNOSIS — M54.50 BILATERAL LOW BACK PAIN WITHOUT SCIATICA: ICD-10-CM

## 2019-08-20 PROCEDURE — 97112 NEUROMUSCULAR REEDUCATION: CPT | Mod: GP | Performed by: PHYSICAL THERAPIST

## 2019-08-20 PROCEDURE — 97110 THERAPEUTIC EXERCISES: CPT | Mod: GP | Performed by: PHYSICAL THERAPIST

## 2019-08-23 ENCOUNTER — OFFICE VISIT (OUTPATIENT)
Dept: RADIATION ONCOLOGY | Facility: CLINIC | Age: 79
End: 2019-08-23
Attending: RADIOLOGY
Payer: MEDICARE

## 2019-08-23 ENCOUNTER — ONCOLOGY VISIT (OUTPATIENT)
Dept: ONCOLOGY | Facility: CLINIC | Age: 79
End: 2019-08-23
Attending: INTERNAL MEDICINE
Payer: MEDICARE

## 2019-08-23 VITALS
HEIGHT: 62 IN | RESPIRATION RATE: 16 BRPM | BODY MASS INDEX: 21.57 KG/M2 | WEIGHT: 117.2 LBS | HEART RATE: 74 BPM | SYSTOLIC BLOOD PRESSURE: 119 MMHG | OXYGEN SATURATION: 97 % | DIASTOLIC BLOOD PRESSURE: 70 MMHG

## 2019-08-23 VITALS — SYSTOLIC BLOOD PRESSURE: 118 MMHG | WEIGHT: 117 LBS | BODY MASS INDEX: 21.23 KG/M2 | DIASTOLIC BLOOD PRESSURE: 68 MMHG

## 2019-08-23 DIAGNOSIS — C21.1 MALIGNANT NEOPLASM OF ANAL CANAL (H): Primary | ICD-10-CM

## 2019-08-23 PROCEDURE — 99213 OFFICE O/P EST LOW 20 MIN: CPT | Mod: ZP | Performed by: INTERNAL MEDICINE

## 2019-08-23 PROCEDURE — G0463 HOSPITAL OUTPT CLINIC VISIT: HCPCS | Performed by: RADIOLOGY

## 2019-08-23 PROCEDURE — 40000114 ZZH STATISTIC NO CHARGE CLINIC VISIT

## 2019-08-23 PROCEDURE — G0463 HOSPITAL OUTPT CLINIC VISIT: HCPCS | Mod: ZF

## 2019-08-23 ASSESSMENT — MIFFLIN-ST. JEOR: SCORE: 958.84

## 2019-08-23 ASSESSMENT — PAIN SCALES - GENERAL: PAINLEVEL: NO PAIN (0)

## 2019-08-23 NOTE — PROGRESS NOTES
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 Staging evaluation revealed a 2.5 cm mass located approximately 1 cm from the anal verge with involvement of the internal anal sphincter but not the external anal sphincter or levator ani. Two FDG-avid left inguinal nodes were also appreciated.  She received curative-intent chemoradiotherapy as described above with concurrent mitomycin-C and 5-FU. Mitomycin was held during the second cycle of systemic therapy due to concern for treatment-induced toxicities given her age and borderline functional status.    Ms. Taylor returns to radiation oncology clinic today for a routine post-treatment disease surveillance visit. She is having 3 soft but formed bowel movements per day and she specifically denies any tenesmus or hematochezia. She does have some mild urinary urgency during the day which is not particularly bothersome for her however she does experience fairly regular incontinence at night which has been unchanged over the past several months. She denies any dysuria or hematuria. Additionally, she describes poor sleep hygiene with difficulty falling and staying asleep which she attributes to her concern with her incontinence at  night and she inquires about the possible use of a prescription sleep aid. Regarding her remaining ROS, she has a good appetite and denies any nausea/vomiting, abdominal pain or early satiety. She reports poor compliance with her provided vaginal dilator and states that she occasionally uses her finger for dilation.    She was seen in gyn onc clinic where exam was notable for radiation-induced cutaneous changes as well as vaginal stenosis without any other concerning findings. She was also seen by Dr. Rosas in late  for ongoing anal cancer surveillance and repeat exam including anoscopy and imaging was negative for recurrent disease.    PHYSICAL EXAM:  Weight: 53.1 kg  BP: 118/68    General: Pleasant 79 year old female sitting comfortably in the examination chair in NAD  Pulmonary: No wheezing, stridor or respiratory distress  Cardiovascular: Extremities are warm and well-perfused. No pedal edema.   GI/: Cutaneous changes related to prior radiotherapy with mixed hyper- and hypopigmentation involving the perianal skin, perineum and bilateral groin (left > right). No anal masses. No palpable adenopathy within the bilateral groins.  Skin: Cutaneous changes within the prior radiation treatment portal as described above.    LABS AND IMAGIN2019 MRI pelvis:  No evidence of residual disease    2019 CT chest/abdomen/pelvis:  No evidence of metastatic disease    IMPRESSION:   Ms. 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 She is currently 17 months out from the completion of treatment and remains clinically MARGARET.    PLAN:   1. Follow-up in radiation oncology clinic with NP in 6 months with MD in 12 months for ongoing disease surveillance  2. Continue follow-up with colorectal surgery and medical oncology as scheduled  3. Recommended increased vaginal dilator use to minimize further vaginal stenosis  4. Discussed  sleep aids such as melatonin and recommended that she follow-up with her PCP for further assessment    Zaire Madison MD/PhD    Department of Radiation Oncology  [facility]

## 2019-08-23 NOTE — LETTER
8/23/2019       RE: Elizabeth Taylor  [address]  [facility] [address]     Dear Colleague,    Thank you for referring your patient, Elizabeth Talyor, to the [facility]. Please see a copy of my visit note below.      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 She underwent a colonoscopy in April 2017 which noted to have small anal fissure along with internal hemorrhoids. Symptoms continued and  she was referred to colorectal surgery for further evaluation. On exam she was found to have an 1.5 cm anal lesion on the left side along with left groin palpable adenopathy. Biopsy of anal mass came back positive for squamous cell carcinoma. Patient underwent staging workup with MRI pelvis and a PET scan- showed PET avid left anal canal mass along with small left inguinal FDG avid lymph nodes.     02/12/18- Started on concurrent chemoradiation with 5FU/Mitomycin    03/13/18 Skipped Mitomycin and proceeded with 5FU at reduced dose given neutropenia/old age    03/19/18 -04/04/18 [facility] with intractable diarrhea, nausea, generalized weakness and fall at home resulting in multiple right rib fractures. Hospital course was complicated by small bowel obstruction for which patient was started on TPN. Bowel obstruction was managed conservatively eventually improved prior to discharge . 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 She was eventually discharged to transitional care unit.    05/25/18 05/25/18 MRI pelvis and  PET scan done showed near complete resolution of patient's known anal lesion with no evidence for recurrence or metastatic disease.    07/25/18-07/30/18 Admitted to the hospital with traumatic right femoral neck fracture secondary to fall. She underwent right hemiarthroplasty and was discharged to transitional care facility. Also noted to have a C. difficile infection and was started on oral vancomycin course.    11/1/18-11/5/18 admitted to hospital with a fall and left hip pain found to have pelvic ring fracture. Treated conservatively with pain control, PT/OT, and recommendations for PCP follow-up with DEXA scan and osteoporosis work-up.     06/2019 CT scans/MRI pelvis as well as anoscopy exam negative for evidence of recurrence /metastasis      SUBJECTIVE      She is in clinic today for 6 MONTH follow-up  Feeling great overall. Energy levels have improved.   Denies abdominal pain, diarrhea blood in the stools, fever/chills, nausea/vomiting or any other complaints      PAST MEDICAL HISTORY     Past Medical History:   Diagnosis Date     Anal cancer (H)      Endometriosis, site unspecified      Thyroiditis, unspecified     Thryoiditis-resolved         CURRENT OUTPATIENT MEDICATIONS     Current Outpatient Medications   Medication Sig Dispense Refill     acetaminophen (TYLENOL) 500 MG tablet Take 1,000 mg by mouth 3 times daily       Calcium-Magnesium-Zinc 333-133-5 MG TABS per tablet Take 1 tablet by mouth daily       cholecalciferol (VITAMIN D3) 5000 units (125 mcg) capsule 1 CAP BY MOUTH DAILY (DX: OSTEOPOROSIS) 90 capsule 3     gabapentin (NEURONTIN) 100 MG capsule Take 1-3 capsules (100-300 mg) by mouth nightly as needed (muscle spasms) 90 capsule 3     metroNIDAZOLE (METROGEL) 0.75 % external gel Apply topically 2 times daily 45 g 3     vitamin B complex with vitamin C (VITAMIN  B COMPLEX) TABS tablet Take 1 tablet by mouth daily       VITAMIN E PO Take 1 capsule by mouth daily       docusate sodium (COLACE) 100  MG capsule Take 1 capsule (100 mg) by mouth 2 times daily (Patient not taking: Reported on 3/7/2019) 60 capsule         ALLERGIES     Allergies   Allergen Reactions     Darvon [Propoxyphene]      Had reaction in teen years     Ketoconazole Rash     Penicillins      As a child        REVIEW OF SYSTEMS   As above in the HPI, o/w complete 12-point ROS was negative.     PHYSICAL EXAM   B/P: 131/82, T: 98.5, P: 78, R: 16  SpO2 Readings from Last 4 Encounters:   08/31/18 96%   08/31/18 96%   07/30/18 97%   07/05/18 96%     Wt Readings from Last 3 Encounters:   08/23/19 53.2 kg (117 lb 3.2 oz)   07/09/19 53.1 kg (117 lb)   06/25/19 53.3 kg (117 lb 9.6 oz)     GEN: alert. No distress  Mouth/ENT: Oropharynx is clear.   LUNGS: clear  CV: regular  ABDOMEN: soft, non-tender, non-distended, no palpable inguinal adenopathy  EXT: warm, well perfused  NEURO: alert       ASSESSMENT AND PLAN     78-year-old female with no significant past medical history who presented with complaints of bright red blood per rectum and was recently found to have an anal mass which on biopsy came back for positive for invasive squamous cell carcinoma. Staging workup showed a PET avid anal mass along with hypermetabolic left inguinal adenopathy.     - Squamous cell carcinoma Anal canal  T2N1- IIIA   Recent CT scan  negative for evidence of metastatic disease.  Currently on surveillance with  rectal exam with inguinal lymph node palpation every 3-6 months for 5 years along with Anoscopy every 6-12 months for 3 years.   She has follow up scheduled with Surgery on 01/2020  Patient will also require a annual CT chest abdomen and pelvis with contrast for 3 years.     RTC in 6 month for ov and transfer care  Chart documentation with Dragon Voice recognition Software. Although reviewed after completion, some words and grammatical errors may remain.  Shen Ray MD  Attending Physician   Hematology/Medical Oncology

## 2019-08-23 NOTE — PROGRESS NOTES
FOLLOW-UP VISIT    Patient Name: Elizabeth Taylor      : 1940     Age: 79 year old        ______________________________________________________________________________     Chief Complaint   Patient presents with     Cancer     Pt is here for a follow up: Anal Cancer : Radiation 5400 cGy completed on 3/23/18     Wt 53.1 kg (117 lb)   LMP  (LMP Unknown)   BMI 21.23 kg/m         Pain  Denies    Labs  Other Labs: No    Imaging  None      Other Appointments:     MD Name: Dr Ray Appointment Date: today   MD Name: Appointment Date:   MD Name: Appointment Date:   Other Appointment Notes:     Residual Radiation side effect: No concerns     Additional Instructions:     Nurse face-to-face time: Level 3:  10 min face to face time

## 2019-08-23 NOTE — LETTER
2019      RE: Elizabeth Taylor  [address]  [place] [address]          Department of Radiation Oncology  [facility]  [address] [place] [address]  [phone]       Radiation Oncology Follow-up Visit  2019      Elizabeth Taylor  MRN: [id number]   : 1940     DISEASE TREATED:   cT2 N1a M0 squamous cell carcinoma of the anal canal    RADIATION THERAPY DELIVERED:   5400 cGy delivered in 30 once-daily fractions, from 2018 - 3/23/2018    SYSTEMIC THERAPY:  Mitomycin-C and 5-FU    INTERVAL SINCE COMPLETION OF RADIATION THERAPY:   Approximately 17 months    SUBJECTIVE:   Elizabeth Taylor is an 79 year old female with a PMH significant for a locally advanced squamous cell carcinoma of the anal canal after she presented with progressive anal pain and small amounts of bright red blood per rectum. Staging evaluation revealed a 2.5 cm mass located approximately 1 cm from the anal verge with involvement of the internal anal sphincter but not the external anal sphincter or levator ani. Two FDG-avid left inguinal nodes were also appreciated.  She received curative-intent chemoradiotherapy as described above with concurrent mitomycin-C and 5-FU. Mitomycin was held during the second cycle of systemic therapy due to concern for treatment-induced toxicities given her age and borderline functional status.    Ms. Taylor returns to radiation oncology clinic today for a routine post-treatment disease surveillance visit. She is having 3 soft but formed bowel movements per day and she specifically denies any tenesmus or hematochezia. She does have some mild urinary urgency during the day which is not particularly bothersome for her however she does experience fairly regular incontinence at night which has been unchanged over the past several months. She denies any dysuria or hematuria. Additionally, she describes poor sleep hygiene with  difficulty falling and staying asleep which she attributes to her concern with her incontinence at night and she inquires about the possible use of a prescription sleep aid. Regarding her remaining ROS, she has a good appetite and denies any nausea/vomiting, abdominal pain or early satiety. She reports poor compliance with her provided vaginal dilator and states that she occasionally uses her finger for dilation.    She was seen in gyn onc clinic where exam was notable for radiation-induced cutaneous changes as well as vaginal stenosis without any other concerning findings. She was also seen by Dr. Rosas in late  for ongoing anal cancer surveillance and repeat exam including anoscopy and imaging was negative for recurrent disease.    PHYSICAL EXAM:  Weight: 53.1 kg  BP: 118/68    General: Pleasant 79 year old female sitting comfortably in the examination chair in NAD  Pulmonary: No wheezing, stridor or respiratory distress  Cardiovascular: Extremities are warm and well-perfused. No pedal edema.   GI/: Cutaneous changes related to prior radiotherapy with mixed hyper- and hypopigmentation involving the perianal skin, perineum and bilateral groin (left > right). No anal masses. No palpable adenopathy within the bilateral groins.  Skin: Cutaneous changes within the prior radiation treatment portal as described above.    LABS AND IMAGIN2019 MRI pelvis:  No evidence of residual disease    2019 CT chest/abdomen/pelvis:  No evidence of metastatic disease    IMPRESSION:   Ms. 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 She is currently 17 months out from the completion of treatment and remains clinically MARGARET.    PLAN:   1. Follow-up in radiation oncology clinic with NP in 6 months with MD in 12 months for ongoing disease surveillance  2. Continue follow-up with colorectal surgery and medical oncology as  scheduled  3. Recommended increased vaginal dilator use to minimize further vaginal stenosis  4. Discussed sleep aids such as melatonin and recommended that she follow-up with her PCP for further assessment    Zaire Madison MD/PhD    Department of Radiation Oncology  [facility]      FOLLOW-UP VISIT    Patient Name: Elizabeth Taylor      : 1940     Age: 79 year old        ______________________________________________________________________________     Chief Complaint   Patient presents with     Cancer     Pt is here for a follow up: Anal Cancer : Radiation 5400 cGy completed on 3/23/18     Wt 53.1 kg (117 lb)   LMP  (LMP Unknown)   BMI 21.23 kg/m          Pain  Denies    Labs  Other Labs: No    Imaging  None      Other Appointments:     MD Name: Dr Ray Appointment Date: today   MD Name: Appointment Date:   MD Name: Appointment Date:   Other Appointment Notes:     Residual Radiation side effect: No concerns     Additional Instructions:     Nurse face-to-face time: Level 3:  10 min face to face time          Zaire Madison MD

## 2019-08-23 NOTE — PROGRESS NOTES
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 She underwent a colonoscopy in April 2017 which noted to have small anal fissure along with internal hemorrhoids. Symptoms continued and  she was referred to colorectal surgery for further evaluation. On exam she was found to have an 1.5 cm anal lesion on the left side along with left groin palpable adenopathy. Biopsy of anal mass came back positive for squamous cell carcinoma. Patient underwent staging workup with MRI pelvis and a PET scan- showed PET avid left anal canal mass along with small left inguinal FDG avid lymph nodes.     02/12/18- Started on concurrent chemoradiation with 5FU/Mitomycin    03/13/18 Skipped Mitomycin and proceeded with 5FU at reduced dose given neutropenia/old age    03/19/18 -04/04/18 admitted to the [facility] with intractable diarrhea, nausea, generalized weakness and fall at home resulting in multiple right rib fractures. Hospital course was complicated by small bowel obstruction for which patient was started on TPN. Bowel obstruction was managed conservatively eventually improved prior to discharge . 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 She was eventually discharged to transitional care unit.    05/25/18 05/25/18 MRI pelvis and PET scan done showed near complete resolution of patient's known anal lesion with no evidence for recurrence or metastatic disease.    07/25/18-07/30/18 Admitted to the hospital with traumatic right femoral neck fracture secondary to fall. She underwent  right hemiarthroplasty and was discharged to transitional care facility. Also noted to have a C. difficile infection and was started on oral vancomycin course.    11/1/18-11/5/18 admitted to hospital with a fall and left hip pain found to have pelvic ring fracture. Treated conservatively with pain control, PT/OT, and recommendations for PCP follow-up with DEXA scan and osteoporosis work-up.     06/2019 CT scans/MRI pelvis as well as anoscopy exam negative for evidence of recurrence /metastasis      SUBJECTIVE      She is in clinic today for 6 MONTH follow-up  Feeling great overall. Energy levels have improved.   Denies abdominal pain, diarrhea blood in the stools, fever/chills, nausea/vomiting or any other complaints      PAST MEDICAL HISTORY     Past Medical History:   Diagnosis Date     Anal cancer (H)      Endometriosis, site unspecified      Thyroiditis, unspecified     Thryoiditis-resolved         CURRENT OUTPATIENT MEDICATIONS     Current Outpatient Medications   Medication Sig Dispense Refill     acetaminophen (TYLENOL) 500 MG tablet Take 1,000 mg by mouth 3 times daily       Calcium-Magnesium-Zinc 333-133-5 MG TABS per tablet Take 1 tablet by mouth daily       cholecalciferol (VITAMIN D3) 5000 units (125 mcg) capsule 1 CAP BY MOUTH DAILY (DX: OSTEOPOROSIS) 90 capsule 3     gabapentin (NEURONTIN) 100 MG capsule Take 1-3 capsules (100-300 mg) by mouth nightly as needed (muscle spasms) 90 capsule 3     metroNIDAZOLE (METROGEL) 0.75 % external gel Apply topically 2 times daily 45 g 3     vitamin B complex with vitamin C (VITAMIN  B COMPLEX) TABS tablet Take 1 tablet by mouth daily       VITAMIN E PO Take 1 capsule by mouth daily       docusate sodium (COLACE) 100 MG capsule Take 1 capsule (100 mg) by mouth 2 times daily (Patient not taking: Reported on 3/7/2019) 60 capsule         ALLERGIES     Allergies   Allergen Reactions     Darvon [Propoxyphene]      Had reaction in teen years     Ketoconazole Rash      Penicillins      As a child        REVIEW OF SYSTEMS   As above in the HPI, o/w complete 12-point ROS was negative.     PHYSICAL EXAM   B/P: 131/82, T: 98.5, P: 78, R: 16  SpO2 Readings from Last 4 Encounters:   08/31/18 96%   08/31/18 96%   07/30/18 97%   07/05/18 96%     Wt Readings from Last 3 Encounters:   08/23/19 53.2 kg (117 lb 3.2 oz)   07/09/19 53.1 kg (117 lb)   06/25/19 53.3 kg (117 lb 9.6 oz)     GEN: alert. No distress  Mouth/ENT: Oropharynx is clear.   LUNGS: clear  CV: regular  ABDOMEN: soft, non-tender, non-distended, no palpable inguinal adenopathy  EXT: warm, well perfused  NEURO: alert       ASSESSMENT AND PLAN     78-year-old female with no significant past medical history who presented with complaints of bright red blood per rectum and was recently found to have an anal mass which on biopsy came back for positive for invasive squamous cell carcinoma. Staging workup showed a PET avid anal mass along with hypermetabolic left inguinal adenopathy.     - Squamous cell carcinoma Anal canal  T2N1- IIIA   Recent CT scan  negative for evidence of metastatic disease.  Currently on surveillance with  rectal exam with inguinal lymph node palpation every 3-6 months for 5 years along with Anoscopy every 6-12 months for 3 years.   She has follow up scheduled with Surgery on 01/2020  Patient will also require a annual CT chest abdomen and pelvis with contrast for 3 years.     RTC in 6 month for ov and transfer care  Chart documentation with Dragon Voice recognition Software. Although reviewed after completion, some words and grammatical errors may remain.  Shen Ray MD  Attending Physician   Hematology/Medical Oncology

## 2019-08-27 ENCOUNTER — THERAPY VISIT (OUTPATIENT)
Dept: PHYSICAL THERAPY | Facility: CLINIC | Age: 79
End: 2019-08-27
Payer: MEDICARE

## 2019-08-27 DIAGNOSIS — M54.50 BILATERAL LOW BACK PAIN WITHOUT SCIATICA: ICD-10-CM

## 2019-08-27 PROCEDURE — 97110 THERAPEUTIC EXERCISES: CPT | Mod: GP | Performed by: PHYSICAL THERAPIST

## 2019-08-27 PROCEDURE — 97112 NEUROMUSCULAR REEDUCATION: CPT | Mod: GP | Performed by: PHYSICAL THERAPIST

## 2019-09-03 ENCOUNTER — THERAPY VISIT (OUTPATIENT)
Dept: PHYSICAL THERAPY | Facility: CLINIC | Age: 79
End: 2019-09-03
Payer: MEDICARE

## 2019-09-03 DIAGNOSIS — M54.50 BILATERAL LOW BACK PAIN WITHOUT SCIATICA: ICD-10-CM

## 2019-09-03 PROCEDURE — 97110 THERAPEUTIC EXERCISES: CPT | Mod: GP | Performed by: PHYSICAL THERAPIST

## 2019-09-03 PROCEDURE — 97112 NEUROMUSCULAR REEDUCATION: CPT | Mod: GP | Performed by: PHYSICAL THERAPIST

## 2019-09-10 ENCOUNTER — THERAPY VISIT (OUTPATIENT)
Dept: PHYSICAL THERAPY | Facility: CLINIC | Age: 79
End: 2019-09-10
Payer: MEDICARE

## 2019-09-10 DIAGNOSIS — M54.50 BILATERAL LOW BACK PAIN WITHOUT SCIATICA: ICD-10-CM

## 2019-09-10 PROCEDURE — 97112 NEUROMUSCULAR REEDUCATION: CPT | Mod: GP | Performed by: PHYSICAL THERAPIST

## 2019-09-10 PROCEDURE — 97110 THERAPEUTIC EXERCISES: CPT | Mod: GP | Performed by: PHYSICAL THERAPIST

## 2019-09-24 ENCOUNTER — THERAPY VISIT (OUTPATIENT)
Dept: PHYSICAL THERAPY | Facility: CLINIC | Age: 79
End: 2019-09-24
Payer: MEDICARE

## 2019-09-24 DIAGNOSIS — M54.50 BILATERAL LOW BACK PAIN WITHOUT SCIATICA: ICD-10-CM

## 2019-09-24 PROCEDURE — 97110 THERAPEUTIC EXERCISES: CPT | Mod: GP | Performed by: PHYSICAL THERAPIST

## 2019-09-24 PROCEDURE — 97112 NEUROMUSCULAR REEDUCATION: CPT | Mod: GP | Performed by: PHYSICAL THERAPIST

## 2019-09-24 NOTE — PROGRESS NOTES
PROGRESS  REPORT    Progress reporting period is from 8/1/19 to 9/24/19. Pt has attended 8 total PT sessions addressing patient's chief complaint of LBP with a previous history of R JENIFFER on 7/26/18. Pt has made considerable improvement in LBP and has been very pleased with her progress since the start of PT. Pt exhibits improved postural control and performance of HEP, and feels like her LLD has improved with exercise. Pt would like to cont HEP w/ plans to f/u if sxs return.  This note to serve as discharge summary if no f/u in 60 days.     SUBJECTIVE  Subjective: Pt reports considerable improvement in LBP and has been very pleased with her progress. Pt feels comfortable working on her HEP independently    Current Pain level: 0/10.     Initial Pain level: 0/10.   Changes in function:  Yes (See Goal flowsheet attached for changes in current functional level)  Adverse reaction to treatment or activity: None    OBJECTIVE  Changes noted in objective findings:  Yes,   Good technique and understanding for HEP. Improved postural control in standing and sitting.   PT recommends pt cont HEP independently w/ plans to f/u as needed     ASSESSMENT/PLAN  Updated problem list and treatment plan: Diagnosis 1:  LBP    STG/LTGs have been met or progress has been made towards goals:  Yes (See Goal flow sheet completed today.)  Assessment of Progress: The patient's condition is improving.  Self Management Plans:  Patient has been instructed in a home treatment program.  I have re-evaluated this patient and find that the nature, scope, duration and intensity of the therapy is appropriate for the medical condition of the patient.  Elizabeth continues to require the following intervention to meet STG and LTG's:  PT intervention is no longer required to meet STG/LTG.    Recommendations:  This patient is ready to be discharged from therapy and continue their home treatment program.    Please refer to the daily flowsheet for treatment today,  total treatment time and time spent performing 1:1 timed codes.

## 2019-10-03 ENCOUNTER — HEALTH MAINTENANCE LETTER (OUTPATIENT)
Age: 79
End: 2019-10-03

## 2019-12-03 DIAGNOSIS — M25.552 HIP PAIN, LEFT: ICD-10-CM

## 2019-12-03 RX ORDER — GABAPENTIN 100 MG/1
100-300 CAPSULE ORAL
Qty: 90 CAPSULE | Refills: 3 | Status: SHIPPED | OUTPATIENT
Start: 2019-12-03 | End: 2020-04-09

## 2019-12-03 NOTE — TELEPHONE ENCOUNTER
Dr. Seth-Please sign if agree.    Routing refill request to provider for review/approval because:  Drug not on the [facility] refill protocol       Thank you!  HONEY RoseN, RN      Requested Prescriptions   Pending Prescriptions Disp Refills     gabapentin (NEURONTIN) 100 MG capsule 90 capsule 3     Sig: Take 1-3 capsules (100-300 mg) by mouth nightly as needed (muscle spasms)       There is no refill protocol information for this order

## 2019-12-03 NOTE — TELEPHONE ENCOUNTER
Reason for Call:  Medication or medication refill:    Do you use a [facility]?  Name of the pharmacy and phone number for the current request:  [facility] - [place], [place] - [facility] [address]    Name of the medication requested: gabapentin (NEURONTIN) 100 MG capsule    Other request: Patient is requesting a refill(s) on her medication, hopefully prescribed as it was last time to last at least 4 months. States she is out after tonight and has a ride to the pharmacy tomorrow if this can be approved in time. Please assist. Thanks!    Can we leave a detailed message on this number? YES    Phone number patient can be reached at: Home number on file [phone] (home)    Best Time: Any    Call taken on 12/3/2019 at 3:08 PM by Mallory Castillo

## 2020-01-02 ENCOUNTER — TELEPHONE (OUTPATIENT)
Dept: SURGERY | Facility: CLINIC | Age: 80
End: 2020-01-02

## 2020-01-02 DIAGNOSIS — C21.1 MALIGNANT NEOPLASM OF ANAL CANAL (H): Primary | ICD-10-CM

## 2020-01-02 NOTE — TELEPHONE ENCOUNTER
3T MRI needed prior to appt on Monday, 1/6/20, otherwise she will need to reschedule until after MRI is done.   Order for MRI placed. LVM for Pt to call me back.    Piter Guerrero LPN

## 2020-01-02 NOTE — TELEPHONE ENCOUNTER
Pt's friend, Katherine, left VM wondering if Pt needs any testings/MRI done before her appt on Mon 1/6.     I called Katherine back and let her know that I left a VM for Pt stating that she needs a MRI done prior to appt on 1/6/20. Informed Katherine that Radiology is booked out until 1/9/20. Per Katherine, she will call Radiology to schedule MRI and will call back to reschedule OV with Dr. Rosas.    Piter Guerrero LPN

## 2020-01-03 ENCOUNTER — TELEPHONE (OUTPATIENT)
Dept: ONCOLOGY | Facility: CLINIC | Age: 80
End: 2020-01-03

## 2020-01-03 NOTE — TELEPHONE ENCOUNTER
On 1/2, RN received voice mail message from pt's friend, Elizabeth, asking if they need any testing prior to their appt with Dr. Rosas on Monday, 1/6.  She also asked for call back on who she should call if not author.  Spoke with pt today, 1/3, after noted she was in communication with Piter Guerrero in Dr. Rosas's office on 1/2 about need for MRI.  She has already scheduled MRI for this Thursday and rescheduled appt with Dr. Rosas for after MRI though new appt not yet changed in appt desk at this time.    Reviewed pt has appt with new provider, Dr. Jones, in our office on 2/24.  Katherine asking if they need any labs or additional testing for Dr. Jones's appt as trying to condense appts as much as possible for Elizabeth (and her safety).  Elizabeth is 79 years old with hx of R hip replacement and in the Fall was going to PT for bilat low back pain..  RN explained some physicians choose to see new pts before ordering labs or other testing and some will order ahead of time.  Stated not sure of Dr. Jones's usual practice but will check with MADELEINE Meza care coordinator who works with his patients.  She appreciated assistance.

## 2020-01-06 ENCOUNTER — TELEPHONE (OUTPATIENT)
Dept: FAMILY MEDICINE | Facility: CLINIC | Age: 80
End: 2020-01-06

## 2020-01-06 ENCOUNTER — NURSE TRIAGE (OUTPATIENT)
Dept: NURSING | Facility: CLINIC | Age: 80
End: 2020-01-06

## 2020-01-06 DIAGNOSIS — M62.838 MUSCLE SPASM: Primary | ICD-10-CM

## 2020-01-07 RX ORDER — LORAZEPAM 0.5 MG/1
0.5 TABLET ORAL ONCE
Qty: 1 TABLET | Refills: 0 | Status: SHIPPED | OUTPATIENT
Start: 2020-01-07 | End: 2020-03-11

## 2020-01-07 NOTE — TELEPHONE ENCOUNTER
She is having problems with leg spasms , so she is having trouble sleeping. The big problems is that she was unable to hold still for her MRI with her leg spasms.  Is there a medication that she can take to calm the spasms so they are able to complete the MRI??  Her MRI is scheduled for 1/9/20 Thursday.    Karyn Lan RN/ [facility]        Reason for Disposition    Health Information question, no triage required and triager able to answer question    Protocols used: INFORMATION ONLY CALL-A-AH

## 2020-01-07 NOTE — TELEPHONE ENCOUNTER
We could try a one time dose of ativan before the procedure.. this was sent in.     Baron Seth MD

## 2020-01-07 NOTE — TELEPHONE ENCOUNTER
She is having problems with leg spasms , so she is having trouble sleeping. The big problems is that she was unable to hold still for her MRI with her leg spasms.  Is there a medication that she can take to calm the spasms so they are able to complete the MRI??  Her MRI is scheduled for 1/9/20 Thursday.    Karyn Lan RN/ [facility]

## 2020-01-08 ENCOUNTER — PATIENT OUTREACH (OUTPATIENT)
Dept: ONCOLOGY | Facility: CLINIC | Age: 80
End: 2020-01-08

## 2020-01-08 NOTE — PROGRESS NOTES
Left message for pt's friend, Katherine, today stating calling back regarding conversation last week.  Stated only need to add lab appt prior to visit with Dr. Jones on 2/24 at 4:00.  Noted radiation onc appt at hospital at 2:30 and discussed scheduling lab appt for ~3:15 or 3:30.  Went on to explain understand she may be running late on 2/24 with traveling from hospital to [facility] but we will be able to work with situation on 2/24.  Encouraged pt to call [email or web address] line ([phone]) or send [employer] message if any questions or concerns.

## 2020-01-09 ENCOUNTER — ANCILLARY PROCEDURE (OUTPATIENT)
Dept: MRI IMAGING | Facility: CLINIC | Age: 80
End: 2020-01-09
Attending: COLON & RECTAL SURGERY
Payer: MEDICARE

## 2020-01-09 DIAGNOSIS — C21.1 MALIGNANT NEOPLASM OF ANAL CANAL (H): ICD-10-CM

## 2020-01-09 LAB
CREAT BLD-MCNC: 0.7 MG/DL (ref 0.52–1.04)
GFR SERPL CREATININE-BSD FRML MDRD: 81 ML/MIN/{1.73_M2}

## 2020-01-09 RX ORDER — GADOBUTROL 604.72 MG/ML
7.5 INJECTION INTRAVENOUS ONCE
Status: COMPLETED | OUTPATIENT
Start: 2020-01-09 | End: 2020-01-09

## 2020-01-09 RX ADMIN — GADOBUTROL 5 ML: 604.72 INJECTION INTRAVENOUS at 17:30

## 2020-01-10 ENCOUNTER — PATIENT OUTREACH (OUTPATIENT)
Dept: ONCOLOGY | Facility: CLINIC | Age: 80
End: 2020-01-10

## 2020-01-10 ENCOUNTER — TELEPHONE (OUTPATIENT)
Dept: SURGERY | Facility: CLINIC | Age: 80
End: 2020-01-10

## 2020-01-10 DIAGNOSIS — N83.209 OVARIAN CYST: Primary | ICD-10-CM

## 2020-01-10 LAB — RADIOLOGIST FLAGS: NORMAL

## 2020-01-10 NOTE — TELEPHONE ENCOUNTER
[facility] Call Center    Phone Message    May a detailed message be left on voicemail: yes    Reason for Call: incidental findings for the MRI pelvis call back ASAP     Action Taken: Message routed to:  [facility] ([facility] Colon rectal

## 2020-01-10 NOTE — TELEPHONE ENCOUNTER
Incidental finding on MRI of slightly enlarging left ovarian cyst.  Reviewed with Dr Rosas.  He recommends consult with Gyn-Onc.  Referral placed and message sent to .    Contacted pt with results and answered questions.

## 2020-01-10 NOTE — DISCHARGE INSTRUCTIONS
MRI Contrast Discharge Instructions    The IV contrast you received today will pass out of your body in your  urine. This will happen in the next 24 hours. You will not feel this process.  Your urine will not change color.    Drink at least 4 extra glasses of water or juice today (unless your doctor  has restricted your fluids). This reduces the stress on your kidneys.  You may take your regular medicines.    If you are on dialysis: It is best to have dialysis today.    If you have a reaction: Most reactions happen right away. If you have  any new symptoms after leaving the hospital (such as hives or swelling),  call your hospital at the correct number below. Or call your family doctor.  If you have breathing distress or wheezing, call 911.    Special instructions: ***    I have read and understand the above information.    Signature:______________________________________ Date:___________    Staff:__________________________________________ Date:___________     Time:__________    [facility] Departments:    ___[place]: [phone]  ___[facility]: [phone]  ___[place]: [phone] ___[facility]: [phone]  ___[facility]: [phone]  ___[facility]: [phone]  ___[phone]  ___[facility]: [phone]  ___[phone]

## 2020-01-10 NOTE — PROGRESS NOTES
"Per conversation with Derrick, pt left message for her, Derrick, very anxious concerned about number of upcoming appts and requested call back.  Spoke with pt this afternoon and re-introduced self since has been awhile since we have spoken.  Pt stated Katherine has been busy taking care of sick family and then she got sick and lost her voice so she has not spoken with her in awhile.  Katherine's  helped her get medication for yesterday's MRI.  Pt stated she had been called with some apptstoday. Reviewed 2/24 appts for lab, radiation follow up, and seeing Dr. Jones for the first time (transfering to him after previously seeing Dr. Ray). Discussed flexibility of lab appt that day. Also reviewed 1/27 appts.  Gave pt time to write down information.    Pt also expressed concern that someone called her today about having a cyst and needing to see someone about it.  Reviewed MRI report of incidental finding: \"Mildly increased size of left adnexal cyst today measuring 3.5 x 3.7 cm, previously 2.7 x 3.4 cm.\"  Discussed size and radiologist's recommendation for follow up with annual US.  Per chart notes, Dr. Rosas recommending consult with Gyn-Onc.  Stated he likely recommended Gyn-Onc re: her history.  Went on to say cysts like this are frequently followed by Gyn with US to monitor for changes.  Told her this is not urgent such that it needs to be scheduled today.  Pt appreciated call back and discussion.    "

## 2020-01-23 NOTE — PROGRESS NOTES
"Colon and Rectal Surgery Follow-up Clinic Note    RE: Elizabeth Taylor  : 1940  SAL[place]OR: 2020    DIAGNOSIS: mT2N1 anal canal squamous cell carcinoma (s/p CXRT [5,400 cGy], completed on 3/23/18).    INTERVAL HISTORY: Denies increased pain, fevers, or chills. Tolerating diet well. Having 3-4 mushy BMs per day. No BPR. Does have persistent fecal urgency and incontinence, mainly to liquid stool. Wears depends for this.      Physical Examination:  /77 (BP Location: Left arm, Patient Position: Sitting, Cuff Size: Adult Small)   Pulse 98   Temp 98.2  F (36.8  C) (Oral)   Ht 5' 2.25\"   Wt 119 lb   LMP  (LMP Unknown)   SpO2 97%   BMI 21.59 kg/m    Abdomen soft, NT. No inguinal adenopathy palpated.  Perianal skin with no more excoriation. Improved hyperpigmentation and induration. No evidence of active infection.  BRITTNEY: no lesions palpated.  Anoscopy: Was performed.   Hemorrhoids: No significant internal hemorrhoids.  Lesions: No. White scar visualized at left anterior aspect of anal canal.    ASSESSMENT  Positive response to CXRT.     CEA: 2.0.    CT CAP 19:  1. No evidence of metastatic disease in the chest, abdomen, or pelvis.  Primary anal malignancy is better evaluated on prior MRI.  2. Several small bilateral pulmonary nodules are unchanged compared to  2018. Continued attention recommended on follow-up.  3. 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  4. Heterogeneous sclerosis of the sacroiliac joints is unchanged,  possibly related to prior radiation therapy.  5. Cholelithiasis without cholecystitis.     3T MR pelvis 2020  1. No evidence of residual/recurrent mass.  2. No evidence of pelvic metastatic disease.  3. Slightly increased size of a left adnexal cyst since 2019. No  nodular enhancing component. A cyst of this is size warrants yearly  ultrasound follow-up.  4. Stable " mildly displaced left inferior pubic ramus fracture and  nondisplaced left superior pubic ramus fracture with nonunion.    PLAN  1. PET/CT, 3T MR pelvis, and clinic visit  in 6/2020.  2. Colonoscopy in 2022.  3. Refer to Gyn Onc because of enlarging left adnexal cyst.    Time spent: 30 minutes.  >50% spent in discussing, counseling and coordinating care.    Emir Rosas M.D., M.Sc.     Division of Colon and Rectal Surgery  [facility]    Referring Provider:  Felisha Davis, CHAYITO  [address] [place] [address]     CC:  MD Zaire Mcleod MD

## 2020-01-27 ENCOUNTER — OFFICE VISIT (OUTPATIENT)
Dept: SURGERY | Facility: CLINIC | Age: 80
End: 2020-01-27
Payer: MEDICARE

## 2020-01-27 VITALS
HEIGHT: 62 IN | TEMPERATURE: 98.2 F | HEART RATE: 98 BPM | OXYGEN SATURATION: 97 % | WEIGHT: 119 LBS | SYSTOLIC BLOOD PRESSURE: 129 MMHG | BODY MASS INDEX: 21.9 KG/M2 | DIASTOLIC BLOOD PRESSURE: 77 MMHG

## 2020-01-27 DIAGNOSIS — C21.1 MALIGNANT NEOPLASM OF ANAL CANAL (H): Primary | ICD-10-CM

## 2020-01-27 ASSESSMENT — PAIN SCALES - GENERAL: PAINLEVEL: NO PAIN (0)

## 2020-01-27 ASSESSMENT — MIFFLIN-ST. JEOR: SCORE: 967

## 2020-01-27 NOTE — NURSING NOTE
"Chief Complaint   Patient presents with     RECHECK     Repeat anoscopy -anal cancer       Vitals:    01/27/20 1433   BP: 129/77   BP Location: Left arm   Patient Position: Sitting   Cuff Size: Adult Small   Pulse: 98   Temp: 98.2  F (36.8  C)   TempSrc: Oral   SpO2: 97%   Weight: 119 lb   Height: 5' 2.25\"       Body mass index is 21.59 kg/m .      Piter Guerrero LPN                        "

## 2020-01-27 NOTE — PATIENT INSTRUCTIONS
FOLLOW UP:   We are working on an appointment in June for a Pet/CT and MRI.  Please contact us with any questions or issues.    Dr. Rosas would like you to see your gynecologist/oncologist.  You may contact their office for an appointment, and we will also let them know you will be in touch.      Phone numbers:      Clinic Appointments [phone] # 1   M-F 7:30 - 5 pm      Surgery Scheduling [phone] (Please allow 3 business days for the surgery scheduler to contact you.)      Radiology Appointments [phone]    Please contact the [facility] by phone or [employer] with any questions or concerns regarding your clinic visit today.    MADELEINE Moore [phone] or MADELEINE Romo [phone]. Clinic Fax Number [phone].  My Chart is available 24 hours a day and is a secure way to access your records and communicate with your care team.  If you would like to inquire about this or are having problems with My Chart access, you may call [phone] or go online at [email or web address].     It is a pleasure being involved in your health care.

## 2020-01-27 NOTE — LETTER
"2020       RE: Elizabeth Taylor  [address]  [facility] [address]     Dear Colleague,    Thank you for referring your patient, Elizabeth Taylor, to the [facility] COLON AND RECTAL SURGERY at [facility]. Please see a copy of my visit note below.    Colon and Rectal Surgery Follow-up Clinic Note    RE: Elizabeth Taylor  : 1940  SALBADOR: 2020    DIAGNOSIS: mT2N1 anal canal squamous cell carcinoma (s/p CXRT [5,400 cGy], completed on 3/23/18).    INTERVAL HISTORY: Denies increased pain, fevers, or chills. Tolerating diet well. Having 3-4 mushy BMs per day. No BPR. Does have persistent fecal urgency and incontinence, mainly to liquid stool. Wears depends for this.      Physical Examination:  /77 (BP Location: Left arm, Patient Position: Sitting, Cuff Size: Adult Small)   Pulse 98   Temp 98.2  F (36.8  C) (Oral)   Ht 5' 2.25\"   Wt 119 lb   LMP  (LMP Unknown)   SpO2 97%   BMI 21.59 kg/m     Abdomen soft, NT. No inguinal adenopathy palpated.  Perianal skin with no more excoriation. Improved hyperpigmentation and induration. No evidence of active infection.  BRITTNEY: no lesions palpated.  Anoscopy: Was performed.   Hemorrhoids: No significant internal hemorrhoids.  Lesions: No. White scar visualized at left anterior aspect of anal canal.    ASSESSMENT  Positive response to CXRT.     CEA: 2.0.    CT CAP 19:  1. No evidence of metastatic disease in the chest, abdomen, or pelvis.  Primary anal malignancy is better evaluated on prior MRI.  2. Several small bilateral pulmonary nodules are unchanged compared to  2018. Continued attention recommended on follow-up.  3. 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  4. Heterogeneous sclerosis of the sacroiliac joints is unchanged,  possibly related to prior radiation therapy.  5. Cholelithiasis without " cholecystitis.     3T MR pelvis 1/9/2020  1. No evidence of residual/recurrent mass.  2. No evidence of pelvic metastatic disease.  3. Slightly increased size of a left adnexal cyst since 6/21/2019. No  nodular enhancing component. A cyst of this is size warrants yearly  ultrasound follow-up.  4. Stable mildly displaced left inferior pubic ramus fracture and  nondisplaced left superior pubic ramus fracture with nonunion.    PLAN  1. PET/CT, 3T MR pelvis, and clinic visit  in 6/2020.  2. Colonoscopy in 2022.  3. Refer to Gyn Onc because of enlarging left adnexal cyst.    Time spent: 30 minutes.  >50% spent in discussing, counseling and coordinating care.    Emir Rosas M.D., M.Sc.     Division of Colon and Rectal Surgery  [facility]    Referring Provider:  Felisha Davis, CHAYITO  [address] [place] [address]     CC:  MD Zaire Mcleod MD

## 2020-01-27 NOTE — NURSING NOTE
Chief Complaint   Patient presents with     Labs Only     No labs drawn; no lab orders; pt escorted and checked in for next appointment on 4th floor     LMP  (LMP Unknown)     No Lab orders. Clinic notified. Pt tolerated well.   Pt checked in for next appointment.    Jayde Melgoza RN

## 2020-01-31 NOTE — TELEPHONE ENCOUNTER
ONCOLOGY INTAKE: Records Information      APPT INFORMATION: 2/10/20 - [facility] - [facility]  Referring provider:  JUAN Rosas  Referring provider s clinic:  Colon & Rectal  Reason for visit/diagnosis:   enlarging left ovarian cyst  Has patient been notified of appointment date and time?: Yes    RECORDS INFORMATION:  Were the records received with the referral (via [employer])? Internal referral    Has patient been seen for any external appt for this diagnosis? No    If yes, where? NA    Has patient had any imaging or procedures outside of [facility] for this condition? No      If Yes, where? NA    ADDITIONAL INFORMATION:  Scheduled via IB from Clarissa martin appt

## 2020-02-08 ENCOUNTER — HEALTH MAINTENANCE LETTER (OUTPATIENT)
Age: 80
End: 2020-02-08

## 2020-02-10 ENCOUNTER — PRE VISIT (OUTPATIENT)
Dept: ONCOLOGY | Facility: CLINIC | Age: 80
End: 2020-02-10

## 2020-02-24 ENCOUNTER — ONCOLOGY VISIT (OUTPATIENT)
Dept: ONCOLOGY | Facility: CLINIC | Age: 80
End: 2020-02-24
Attending: INTERNAL MEDICINE
Payer: MEDICARE

## 2020-02-24 ENCOUNTER — APPOINTMENT (OUTPATIENT)
Dept: LAB | Facility: CLINIC | Age: 80
End: 2020-02-24
Attending: INTERNAL MEDICINE
Payer: MEDICARE

## 2020-02-24 VITALS
HEIGHT: 62 IN | BODY MASS INDEX: 21.53 KG/M2 | DIASTOLIC BLOOD PRESSURE: 78 MMHG | OXYGEN SATURATION: 98 % | HEART RATE: 87 BPM | RESPIRATION RATE: 16 BRPM | SYSTOLIC BLOOD PRESSURE: 123 MMHG | WEIGHT: 117 LBS | TEMPERATURE: 98.6 F

## 2020-02-24 DIAGNOSIS — C21.1 MALIGNANT NEOPLASM OF ANAL CANAL (H): ICD-10-CM

## 2020-02-24 LAB
ALBUMIN SERPL-MCNC: 3.8 G/DL (ref 3.4–5)
ALP SERPL-CCNC: 102 U/L (ref 40–150)
ALT SERPL W P-5'-P-CCNC: 24 U/L (ref 0–50)
ANION GAP SERPL CALCULATED.3IONS-SCNC: 5 MMOL/L (ref 3–14)
AST SERPL W P-5'-P-CCNC: 19 U/L (ref 0–45)
BASOPHILS # BLD AUTO: 0 10E9/L (ref 0–0.2)
BASOPHILS NFR BLD AUTO: 0.5 %
BILIRUB SERPL-MCNC: 1.1 MG/DL (ref 0.2–1.3)
BUN SERPL-MCNC: 15 MG/DL (ref 7–30)
CALCIUM SERPL-MCNC: 9.7 MG/DL (ref 8.5–10.1)
CHLORIDE SERPL-SCNC: 106 MMOL/L (ref 94–109)
CO2 SERPL-SCNC: 29 MMOL/L (ref 20–32)
CREAT SERPL-MCNC: 0.61 MG/DL (ref 0.52–1.04)
DIFFERENTIAL METHOD BLD: NORMAL
EOSINOPHIL # BLD AUTO: 0.1 10E9/L (ref 0–0.7)
EOSINOPHIL NFR BLD AUTO: 0.9 %
ERYTHROCYTE [DISTWIDTH] IN BLOOD BY AUTOMATED COUNT: 13.5 % (ref 10–15)
GFR SERPL CREATININE-BSD FRML MDRD: 85 ML/MIN/{1.73_M2}
GLUCOSE SERPL-MCNC: 94 MG/DL (ref 70–99)
HCT VFR BLD AUTO: 40.2 % (ref 35–47)
HGB BLD-MCNC: 13.1 G/DL (ref 11.7–15.7)
IMM GRANULOCYTES # BLD: 0 10E9/L (ref 0–0.4)
IMM GRANULOCYTES NFR BLD: 0.2 %
LYMPHOCYTES # BLD AUTO: 1.3 10E9/L (ref 0.8–5.3)
LYMPHOCYTES NFR BLD AUTO: 20.2 %
MCH RBC QN AUTO: 30.8 PG (ref 26.5–33)
MCHC RBC AUTO-ENTMCNC: 32.6 G/DL (ref 31.5–36.5)
MCV RBC AUTO: 94 FL (ref 78–100)
MONOCYTES # BLD AUTO: 0.5 10E9/L (ref 0–1.3)
MONOCYTES NFR BLD AUTO: 7.8 %
NEUTROPHILS # BLD AUTO: 4.5 10E9/L (ref 1.6–8.3)
NEUTROPHILS NFR BLD AUTO: 70.4 %
NRBC # BLD AUTO: 0 10*3/UL
NRBC BLD AUTO-RTO: 0 /100
PLATELET # BLD AUTO: 190 10E9/L (ref 150–450)
POTASSIUM SERPL-SCNC: 4.1 MMOL/L (ref 3.4–5.3)
PROT SERPL-MCNC: 7.5 G/DL (ref 6.8–8.8)
RBC # BLD AUTO: 4.26 10E12/L (ref 3.8–5.2)
SODIUM SERPL-SCNC: 140 MMOL/L (ref 133–144)
WBC # BLD AUTO: 6.4 10E9/L (ref 4–11)

## 2020-02-24 PROCEDURE — 80053 COMPREHEN METABOLIC PANEL: CPT | Performed by: INTERNAL MEDICINE

## 2020-02-24 PROCEDURE — G0463 HOSPITAL OUTPT CLINIC VISIT: HCPCS | Mod: ZF

## 2020-02-24 PROCEDURE — 99214 OFFICE O/P EST MOD 30 MIN: CPT | Mod: ZP | Performed by: INTERNAL MEDICINE

## 2020-02-24 PROCEDURE — 25000128 H RX IP 250 OP 636: Mod: ZF | Performed by: INTERNAL MEDICINE

## 2020-02-24 PROCEDURE — 85025 COMPLETE CBC W/AUTO DIFF WBC: CPT | Performed by: INTERNAL MEDICINE

## 2020-02-24 RX ORDER — HEPARIN SODIUM (PORCINE) LOCK FLUSH IV SOLN 100 UNIT/ML 100 UNIT/ML
5 SOLUTION INTRAVENOUS EVERY 8 HOURS
Status: DISCONTINUED | OUTPATIENT
Start: 2020-02-24 | End: 2020-03-01 | Stop reason: HOSPADM

## 2020-02-24 RX ADMIN — Medication 5 ML: at 15:42

## 2020-02-24 ASSESSMENT — PAIN SCALES - GENERAL: PAINLEVEL: NO PAIN (0)

## 2020-02-24 ASSESSMENT — MIFFLIN-ST. JEOR: SCORE: 957.93

## 2020-02-24 NOTE — LETTER
2/24/2020      RE: Elizabeth Taylor  [address]  [facility] [address]       Elizabeth Taylor is a patient new to me here in follow-up of anal cancer.    She is 79 years old and presented in the spring 2018 with a T2N1 anal canal squamous cell carcinoma.  She received definitive chemoradiation and has had a complete response.  At present she feels she is in her usual state of health.  She has frequent somewhat loose stools with urgency and at times incontinence.  This does not represent a significant change for her.  She feels she is eating well.  She reports some difficulty carrying on with her desired activities due to generalized debility ever since her treatment.  She has had evaluation by colorectal surgery including anoscopy in the last few weeks without evidence of disease.    On physical exam she appears her stated age and has unremarkable vital signs.  She has no icterus.  She has no adenopathy palpable in her neck or supraclavicular spaces.  Her lungs are clear to auscultation without dullness to percussion.  Her heart rate and rhythm are regular.  Her abdomen is soft and nontender without mass organomegaly.  There is no palpable inguinal adenopathy.  She has minor edema of her lower extremities left greater than right both with significant varicosities and some hyperpigmentation particularly in her left foot consistent with chronic venous stasis.  Her speech is fluent and her cranial nerves are grossly intact.    I reviewed with her her current lab work which is normal including electrolytes, renal function, liver enzymes and blood counts.  An MR scan from within the last few weeks shows no evidence of disease.    Assessment/plan: Anal cancer currently without evidence of disease now approaching 2 years out from definitive therapy.  We will follow-up with a CT scan and lab work in about 6 months.  Referred her to physical therapy for management of her edema and for her general debility.  She  does have a modestly enlarging cyst in her pelvis and colorectal surgery is already referred her on to [facility] for further evaluation.      David Jones MD

## 2020-02-24 NOTE — PROGRESS NOTES
Elizabeth Taylor is a patient new to me here in follow-up of anal cancer.    She is 79 years old and presented in the spring 2018 with a T2N1 anal canal squamous cell carcinoma.  She received definitive chemoradiation and has had a complete response.  At present she feels she is in her usual state of health.  She has frequent somewhat loose stools with urgency and at times incontinence.  This does not represent a significant change for her.  She feels she is eating well.  She reports some difficulty carrying on with her desired activities due to generalized debility ever since her treatment.  She has had evaluation by colorectal surgery including anoscopy in the last few weeks without evidence of disease.    On physical exam she appears her stated age and has unremarkable vital signs.  She has no icterus.  She has no adenopathy palpable in her neck or supraclavicular spaces.  Her lungs are clear to auscultation without dullness to percussion.  Her heart rate and rhythm are regular.  Her abdomen is soft and nontender without mass organomegaly.  There is no palpable inguinal adenopathy.  She has minor edema of her lower extremities left greater than right both with significant varicosities and some hyperpigmentation particularly in her left foot consistent with chronic venous stasis.  Her speech is fluent and her cranial nerves are grossly intact.    I reviewed with her her current lab work which is normal including electrolytes, renal function, liver enzymes and blood counts.  An MR scan from within the last few weeks shows no evidence of disease.    Assessment/plan: Anal cancer currently without evidence of disease now approaching 2 years out from definitive therapy.  We will follow-up with a CT scan and lab work in about 6 months.  Referred her to physical therapy for management of her edema and for her general debility.  She does have a modestly enlarging cyst in her pelvis and colorectal surgery is already referred  her on to [facility] for further evaluation.

## 2020-02-24 NOTE — NURSING NOTE
"Oncology Rooming Note    February 24, 2020 4:06 PM   Elizabeth Taylor is a 79 year old female who presents for:    Chief Complaint   Patient presents with     Port Draw     Labs drawn via PORT by RN in lab. VS taken.      Oncology Clinic Visit     Return; ANAL CA     Initial Vitals: /78 (BP Location: Right arm, Patient Position: Sitting, Cuff Size: Adult Regular)   Pulse 87   Temp 98.6  F (37  C) (Oral)   Resp 16   Ht 1.581 m (5' 2.25\")   Wt 53.1 kg (117 lb)   LMP  (LMP Unknown)   SpO2 98%   BMI 21.23 kg/m   Estimated body mass index is 21.23 kg/m  as calculated from the following:    Height as of this encounter: 1.581 m (5' 2.25\").    Weight as of this encounter: 53.1 kg (117 lb). Body surface area is 1.53 meters squared.  No Pain (0) Comment: Data Unavailable   No LMP recorded (lmp unknown). Patient is postmenopausal.  Allergies reviewed: Yes  Medications reviewed: Yes    Medications: Medication refills not needed today.  Pharmacy name entered into [facility]:    [facility] - [place], [place] - [address]  [facility] [address]    Clinical concerns:  Pt states she has a lump on her leg that she would like to get looked at.    Akanksha Oglesby CMA              "

## 2020-02-24 NOTE — NURSING NOTE
Chief Complaint   Patient presents with     Port Draw     Labs drawn via PORT by RN in lab. VS taken.      Jarrell Sher RN,

## 2020-02-25 NOTE — PROGRESS NOTES
"                                Consult Notes on Referred Patient    Date: 2020       Emir Rosas MD  [address]  [place] [address]       RE: Elizabeth Taylor  : 1940  [place]: 2020    Dear Emir Rosas:    I had the pleasure of seeing your patient [facility].  As you know she is a very pleasant 79 year old woman with a recent diagnosis of an enlarging left adnexal cyst.  Given these findings she was subsequently sent to the Gyn Onc clinic for new patient consultation.     She has been followed by Colorectal Surgery for a history of squamous cell carcinoma of the anal canal, for which she completed radiation treatment in 2018. Her surveillance MRI in May 2018 showed an incidental left adnexal mass with two cystic structures measuring 1.7 and 2.5cm. This mass was unchanged as recently as 2019. However, the mass was noted to be mildly increased in size on MRI on 2020 (3.5x3.7cm, from 2.7x3.4cm previously).     Overall feeling well today. She has occasional pelvic pressure, usually on the left; unsure how long this has been going on. An \"awareness\" of something on her left side. No vaginal bleeding. Does use vaginal dilator. No abdominal distension or bloating. No abdominal pain. No abnormal weight changes. Has some extremity swelling, left greater than right, has been that way for a long time. She wears compression stockings. Noticed a lump on the right medial aspect of knee recently; unsure how long it's been there thinks about a couple weeks. Has some discharge but is unsure if this is urethra or from vaginal canal, describes it as \"white, greasy.\"     2018 MR PELVIS W/O & W CONTRAST  COMPARISON: MRI pelvis 2018.  IMPRESSION:   1. Interval near resolution of the patient's known left anal mass lesion.  2. No evidence for recurrent or metastatic disease in the pelvis.  3. Stable " left ovarian cysts. Recommend monitoring at least on annual basis (on routine anal cancer restaging follow-up or ultrasound pelvis if at a later date restaging is no longer required).    5/25/2018 Combined Report of: PET and CT   1. Decrease in FDG uptake at the site of the primary anal mass. The mass is poorly defined on the accompanying CT. This is consistent with response to therapy.  2. No significant residual increased FDG uptake in the left inguinal nodes.  3. Small focus of uptake adjacent to the urethra without an accompanying lesion on the CT. This is most suspicious for a urethral contamination, possibly within a urethral diverticulum or a small amount of retained excreted urine tracking up into the vaginal fornix. Recommend attention on follow-up imaging, although alternatively a  repeat MRI of the pelvis may help increase confidence that this finding does not represent small lesion.  8. Redemonstration of the low-density cyst in the left adnexa likely an ovarian cyst, without increased FDG uptake. Continued attention on follow-up imaging is recommended.     01/09/2020 MRI    IMPRESSION:   1. No evidence of residual/recurrent mass  2. No evidence of pelvic metastatic disease.  3. Slightly increased size of a left adnexal cyst since 6/21/2019. No nodular enhancing component. A cyst of this is size warrants yearly ultrasound follow-up.  4. 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    Review of Systems:    Systemic          Weight Changes: No          Fever: no fever        Chills: no chills    Night Sweats: no night sweats               Appetite Change: no appetite changes    Skin           Rashes: no rashes, or lesions  Eye           Irritation: no irritation          Vision Changes: no changes in vision            Romeo-Laryngeal           Dysphagia: no dysphagia          Hoarseness: no hoarseness   Pulmonary            Cough: no cough          SOB no shortness of breath  Cardiovascular           Chest Pain: no chest pain          Palpitations: no palpitations  Gastrointestinal           Diarrhea: no          Constipation: no    Abdominal Pain: no   Bowel Habits: no changes in bowel habits  Blood in Stool: no blood in stool  Genitourinary  Dysuria: no dysuria   Discharge: no abnormal vaginal discharge   Bleeding: no abnormal vaginal bleeding    Some urgency. Occasional urinary and bowel incontinence; especially at night. Wears adult depends, but is usually fine during the day.   Breast   Discharge: no breast discharge     Changes: no breast changes          Pain: no breast pain  Musculoskeletal           Joint Pain: yes - has arthritis    Hematologic           Tenderness: no tender lymph nodes                            Endocrine   Hot Flashes: no hot flashes                               Heat/Cold Intolr: no heat/cold intolerance         Neurological    Numbness:  sometimes numbness and tingling in left arm and hand, but previously had left shoulder problems and it is getting better.    Headaches:  no headaches   Difficulty Sleeping:   Some difficulty sleeping.      Past Medical History:    Past Medical History:   Diagnosis Date     Anal cancer (H)      Endometriosis, site unspecified      Thyroiditis, unspecified     Thryoiditis-resolved     Past Surgical History:    Past Surgical History:   Procedure Laterality Date     APPENDECTOMY      as a child     ARTHROPLASTY HIP Right 7/26/2018    Procedure: ARTHROPLASTY HIP;  Right Hip Hemiarthroplasty;  Surgeon: Zaire Phan MD;  Location: [facility] OR     COLONOSCOPY N/A 4/13/2017    Procedure: COLONOSCOPY;  Surgeon: Juan Dos Santos MD;  Location: [facility] GI     INSERT PORT VASCULAR ACCESS Right 2/8/2018    Procedure: INSERT PORT VASCULAR ACCESS;  Place Single Lumen Venous Chest Port Placement;  Surgeon: Zaire Moreira PA-C;  Location: [facility] OR     SURGICAL HISTORY  OF -       endometriosis     Health Maintenance:    Last Pap Smear:    Unsure, thinks she may have had one.     Last Mamogram:       Unsure when last one was.              Last Colonoscopy:  17   Result: rectal tenderness, sigmoid diverticulosis, anal fissure.     Last DEXA Scan:   19       Result: abnormal, osteoporosis with T-score -4 to -6.4.    Last Diabetes Screening;  Unknown.     Last Thyroid Screenin/3/18, TSH 2.84.      Current Medications:     Current Outpatient Medications   Medication Sig Dispense Refill     acetaminophen (TYLENOL) 500 MG tablet Take 1,000 mg by mouth 3 times daily       Calcium-Magnesium-Zinc 333-133-5 MG TABS per tablet Take 1 tablet by mouth daily       cholecalciferol (VITAMIN D3) 5000 units (125 mcg) capsule 1 CAP BY MOUTH DAILY (DX: OSTEOPOROSIS) 90 capsule 3     gabapentin (NEURONTIN) 100 MG capsule Take 1-3 capsules (100-300 mg) by mouth nightly as needed (muscle spasms) 90 capsule 3     metroNIDAZOLE (METROGEL) 0.75 % external gel Apply topically 2 times daily 45 g 3     vitamin B complex with vitamin C (VITAMIN  B COMPLEX) TABS tablet Take 1 tablet by mouth daily       VITAMIN E PO Take 1 capsule by mouth daily       docusate sodium (COLACE) 100 MG capsule Take 1 capsule (100 mg) by mouth 2 times daily (Patient not taking: Reported on 3/7/2019) 60 capsule      Allergies:     Allergies   Allergen Reactions     Darvon [Propoxyphene]      Had reaction in teen years     Ketoconazole Rash     Penicillins      As a child      Social History:     Social History     Tobacco Use     Smoking status: Never Smoker     Smokeless tobacco: Never Used   Substance Use Topics     Alcohol use: No     Family History:     The patient's family history is notable for the following.  Family History   Problem Relation Age of Onset     Cancer Maternal Grandmother         lymphoma     Heart Disease Father         MI     Uterine Cancer Niece      Physical Exam:   /71   Pulse 78    "Temp 97.8  F (36.6  C) (Oral)   Resp 14   Ht 1.581 m (5' 2.24\")   Wt 53.6 kg (118 lb 1.6 oz)   LMP  (LMP Unknown)   SpO2 97%   BMI 21.43 kg/m    Body mass index is 21.43 kg/m .    General Appearance: healthy and alert      no distress     HEENT:  no thyromegaly    no palpable nodules or masses        Cardiovascular: regular rate and rhythm     Respiratory: lungs clear  no rales, ronchi or wheezes, normal diaphragmatic excursion    Musculoskeletal: Mild extremity swelling of left leg, left greater than right; wearing compression stockings. Approx 2-3 cm fixed, firm mass on medial aspect of right knee. Varicose veins noticeable on legs; veins do not appear to be part of mass.     Neurological: normal gait   no gross defects     Psychiatric: appropriate mood and affect                   Hematological: normal cervical lymph nodes   normal superclavicular lymph nodes   normal inguinal  lymph nodes     Gastrointestinal:       abdomen soft, non-tender, non-distended , no organomegaly. no palpable masses.     Genitourinary: Partial fusion of labia majora and minora with atrophic changes. Atrophy of clitoral randall with thick, white sebaceous appearing material. Vagina is smooth without nodularity or masses. Cervix with atrophic changes, and without lesions (significant pain on speculum exam).  Bimanual exam reveal no masses, nodularity or fullness (pain with bimanual exam).    Assessment:    Elizabeth Taylor is a 79 year old woman with a new diagnosis of enlarging left adnexal cyst some pressure in abdomen. Clitoral randall atrophy.    Plan:     1.)   Can see patient yearly with imaging. If mass continues to increase in size would recommend removal if necessary however patient is asymptomatic now except for mild pressure and with current age would not recommend intervention at this time.    -clitoral randall atrophy, pain with exam. Given 2% lidocaine to use locally to see if can wash area out in bathtub.   -Rx for " estrogen cream locally to clitoral randall due to atrophy and pain.     2.) Genetic risk factors were assessed and the patient does meet the qualifications for referral.  The patient does decline referral.    3.) Labs and/or tests ordered include: , pelvic US or yearly MRI if she is getting these routinely with colorectal for her anal cancer.      Thank you for allowing us to participate in the care of your patient.         Sincerely,      Nicol Goldman MD    Department of Ob/Gyn and Women's Health  Division of Gynecologic Oncology  [facility]  [phone]

## 2020-02-26 PROBLEM — M54.50 BILATERAL LOW BACK PAIN WITHOUT SCIATICA: Status: RESOLVED | Noted: 2019-08-02 | Resolved: 2020-02-26

## 2020-02-27 ENCOUNTER — ONCOLOGY VISIT (OUTPATIENT)
Dept: ONCOLOGY | Facility: CLINIC | Age: 80
End: 2020-02-27
Attending: OBSTETRICS & GYNECOLOGY
Payer: MEDICARE

## 2020-02-27 VITALS
WEIGHT: 118.1 LBS | SYSTOLIC BLOOD PRESSURE: 116 MMHG | RESPIRATION RATE: 14 BRPM | OXYGEN SATURATION: 97 % | HEIGHT: 62 IN | HEART RATE: 78 BPM | DIASTOLIC BLOOD PRESSURE: 71 MMHG | TEMPERATURE: 97.8 F | BODY MASS INDEX: 21.73 KG/M2

## 2020-02-27 DIAGNOSIS — R19.09 OTHER INTRA-ABDOMINAL AND PELVIC SWELLING, MASS AND LUMP: ICD-10-CM

## 2020-02-27 DIAGNOSIS — C21.1 MALIGNANT NEOPLASM OF ANAL CANAL (H): ICD-10-CM

## 2020-02-27 DIAGNOSIS — N83.8 OVARIAN MASS: ICD-10-CM

## 2020-02-27 DIAGNOSIS — C21.1 MALIGNANT NEOPLASM OF ANAL CANAL (H): Primary | ICD-10-CM

## 2020-02-27 DIAGNOSIS — N90.5 ATROPHY OF VULVA: ICD-10-CM

## 2020-02-27 LAB — CANCER AG125 SERPL-ACNC: 10 U/ML (ref 0–30)

## 2020-02-27 PROCEDURE — 86304 IMMUNOASSAY TUMOR CA 125: CPT | Performed by: OBSTETRICS & GYNECOLOGY

## 2020-02-27 PROCEDURE — 99204 OFFICE O/P NEW MOD 45 MIN: CPT | Mod: ZP | Performed by: OBSTETRICS & GYNECOLOGY

## 2020-02-27 PROCEDURE — G0463 HOSPITAL OUTPT CLINIC VISIT: HCPCS

## 2020-02-27 PROCEDURE — 25000128 H RX IP 250 OP 636: Performed by: OBSTETRICS & GYNECOLOGY

## 2020-02-27 PROCEDURE — G0463 HOSPITAL OUTPT CLINIC VISIT: HCPCS | Mod: ZF

## 2020-02-27 RX ORDER — HEPARIN SODIUM (PORCINE) LOCK FLUSH IV SOLN 100 UNIT/ML 100 UNIT/ML
5 SOLUTION INTRAVENOUS DAILY PRN
Status: DISCONTINUED | OUTPATIENT
Start: 2020-02-27 | End: 2020-03-06 | Stop reason: HOSPADM

## 2020-02-27 RX ADMIN — Medication 5 ML: at 11:49

## 2020-02-27 ASSESSMENT — PAIN SCALES - GENERAL: PAINLEVEL: NO PAIN (0)

## 2020-02-27 ASSESSMENT — MIFFLIN-ST. JEOR: SCORE: 962.82

## 2020-02-27 NOTE — NURSING NOTE
Chief Complaint   Patient presents with     Port Draw     Labs drawn via port by RN in lab.      Lani Culp RN

## 2020-02-27 NOTE — NURSING NOTE
"Oncology Rooming Note    February 27, 2020 9:23 AM   Elizabeth Taylor is a 79 year old female who presents for:    Chief Complaint   Patient presents with     Oncology Clinic Visit     New; Left Ovarian Cyst     Initial Vitals: /71   Pulse 78   Temp 97.8  F (36.6  C) (Oral)   Resp 14   Ht 1.581 m (5' 2.24\")   Wt 53.6 kg (118 lb 1.6 oz)   LMP  (LMP Unknown)   SpO2 97%   BMI 21.43 kg/m   Estimated body mass index is 21.43 kg/m  as calculated from the following:    Height as of this encounter: 1.581 m (5' 2.24\").    Weight as of this encounter: 53.6 kg (118 lb 1.6 oz). Body surface area is 1.53 meters squared.  No Pain (0) Comment: Data Unavailable   No LMP recorded (lmp unknown). Patient is postmenopausal.  Allergies reviewed: Yes  Medications reviewed: Yes    Medications: Medication refills not needed today.  Pharmacy name entered into [facility]:    [facility] - [place], [place] - [address]  [place], [place] - [address] [place] [address]    Clinical concerns: No concerns        Kira Guevara CMA              "

## 2020-02-27 NOTE — LETTER
"2020       RE: Elizabeth Taylor  [address]  [place] [address]     Dear Colleague,    Thank you for referring your patient, Elizabeth Taylor, to the [facility]. Please see a copy of my visit note below.                                    Consult Notes on Referred Patient    Date: 2020       Emir Rosas MD  [address] [place] [address]       RE: Elizabeth Taylor  : 1940  [place]: 2020    Dear Emir Rosas:    I had the pleasure of seeing your patient Elizabeth Taylor here at the [facility] on 2020.  As you know she is a very pleasant 79 year old woman with a recent diagnosis of an enlarging left adnexal cyst.  Given these findings she was subsequently sent to the Gyn Onc clinic for new patient consultation.     She has been followed by Colorectal Surgery for a history of squamous cell carcinoma of the anal canal, for which she completed radiation treatment in 2018. Her surveillance MRI in May 2018 showed an incidental left adnexal mass with two cystic structures measuring 1.7 and 2.5cm. This mass was unchanged as recently as 2019. However, the mass was noted to be mildly increased in size on MRI on 2020 (3.5x3.7cm, from 2.7x3.4cm previously).     Overall feeling well today. She has occasional pelvic pressure, usually on the left; unsure how long this has been going on. An \"awareness\" of something on her left side. No vaginal bleeding. Does use vaginal dilator. No abdominal distension or bloating. No abdominal pain. No abnormal weight changes. Has some extremity swelling, left greater than right, has been that way for a long time. She wears compression stockings. Noticed a lump on the right medial aspect of knee recently; unsure how long it's been there thinks about a couple weeks. Has some discharge but is unsure if this is urethra or from vaginal canal, describes it as " "\"white, greasy.\"     5/25/2018 MR PELVIS W/O & W CONTRAST  COMPARISON: MRI pelvis 1/12/2018.  IMPRESSION:   1. Interval near resolution of the patient's known left anal mass lesion.  2. No evidence for recurrent or metastatic disease in the pelvis.  3. Stable left ovarian cysts. Recommend monitoring at least on annual basis (on routine anal cancer restaging follow-up or ultrasound pelvis if at a later date restaging is no longer required).    5/25/2018 Combined Report of: PET and CT   1. Decrease in FDG uptake at the site of the primary anal mass. The mass is poorly defined on the accompanying CT. This is consistent with response to therapy.  2. No significant residual increased FDG uptake in the left inguinal nodes.  3. Small focus of uptake adjacent to the urethra without an accompanying lesion on the CT. This is most suspicious for a urethral contamination, possibly within a urethral diverticulum or a small amount of retained excreted urine tracking up into the vaginal fornix. Recommend attention on follow-up imaging, although alternatively a  repeat MRI of the pelvis may help increase confidence that this finding does not represent small lesion.  8. Redemonstration of the low-density cyst in the left adnexa likely an ovarian cyst, without increased FDG uptake. Continued attention on follow-up imaging is recommended.     01/09/2020 MRI    IMPRESSION:   1. No evidence of residual/recurrent mass  2. No evidence of pelvic metastatic disease.  3. Slightly increased size of a left adnexal cyst since 6/21/2019. No nodular enhancing component. A cyst of this is size warrants yearly ultrasound follow-up.  4. 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    Review of Systems:    Systemic          Weight Changes: No          Fever: no fever        Chills: no chills    Night Sweats: no night sweats               Appetite Change: no appetite " changes    Skin           Rashes: no rashes, or lesions  Eye           Irritation: no irritation          Vision Changes: no changes in vision            Romeo-Laryngeal           Dysphagia: no dysphagia          Hoarseness: no hoarseness   Pulmonary           Cough: no cough          SOB no shortness of breath  Cardiovascular           Chest Pain: no chest pain          Palpitations: no palpitations  Gastrointestinal           Diarrhea: no          Constipation: no    Abdominal Pain: no   Bowel Habits: no changes in bowel habits  Blood in Stool: no blood in stool  Genitourinary  Dysuria: no dysuria   Discharge: no abnormal vaginal discharge   Bleeding: no abnormal vaginal bleeding    Some urgency. Occasional urinary and bowel incontinence; especially at night. Wears adult depends, but is usually fine during the day.   Breast   Discharge: no breast discharge     Changes: no breast changes          Pain: no breast pain  Musculoskeletal           Joint Pain: yes - has arthritis    Hematologic           Tenderness: no tender lymph nodes                            Endocrine   Hot Flashes: no hot flashes                               Heat/Cold Intolr: no heat/cold intolerance         Neurological    Numbness:  sometimes numbness and tingling in left arm and hand, but previously had left shoulder problems and it is getting better.    Headaches:  no headaches   Difficulty Sleeping:   Some difficulty sleeping.      Past Medical History:    Past Medical History:   Diagnosis Date     Anal cancer (H)      Endometriosis, site unspecified      Thyroiditis, unspecified     Thryoiditis-resolved     Past Surgical History:    Past Surgical History:   Procedure Laterality Date     APPENDECTOMY      as a child     ARTHROPLASTY HIP Right 7/26/2018    Procedure: ARTHROPLASTY HIP;  Right Hip Hemiarthroplasty;  Surgeon: Zaire Phan MD;  Location: [facility] OR     COLONOSCOPY N/A 4/13/2017    Procedure: COLONOSCOPY;  Surgeon: Edwin  Juan Stinson MD;  Location: [facility] GI     INSERT PORT VASCULAR ACCESS Right 2018    Procedure: INSERT PORT VASCULAR ACCESS;  Place Single Lumen Venous Chest Port Placement;  Surgeon: Zaire Moreira PA-C;  Location: [facility] OR     SURGICAL HISTORY OF -       endometriosis     Health Maintenance:    Last Pap Smear:    Unsure, thinks she may have had one.     Last Mamogram:       Unsure when last one was.              Last Colonoscopy:  17   Result: rectal tenderness, sigmoid diverticulosis, anal fissure.     Last DEXA Scan:   19       Result: abnormal, osteoporosis with T-score -4 to -6.4.    Last Diabetes Screening;  Unknown.     Last Thyroid Screenin/3/18, TSH 2.84.      Current Medications:     Current Outpatient Medications   Medication Sig Dispense Refill     acetaminophen (TYLENOL) 500 MG tablet Take 1,000 mg by mouth 3 times daily       Calcium-Magnesium-Zinc 333-133-5 MG TABS per tablet Take 1 tablet by mouth daily       cholecalciferol (VITAMIN D3) 5000 units (125 mcg) capsule 1 CAP BY MOUTH DAILY (DX: OSTEOPOROSIS) 90 capsule 3     gabapentin (NEURONTIN) 100 MG capsule Take 1-3 capsules (100-300 mg) by mouth nightly as needed (muscle spasms) 90 capsule 3     metroNIDAZOLE (METROGEL) 0.75 % external gel Apply topically 2 times daily 45 g 3     vitamin B complex with vitamin C (VITAMIN  B COMPLEX) TABS tablet Take 1 tablet by mouth daily       VITAMIN E PO Take 1 capsule by mouth daily       docusate sodium (COLACE) 100 MG capsule Take 1 capsule (100 mg) by mouth 2 times daily (Patient not taking: Reported on 3/7/2019) 60 capsule      Allergies:     Allergies   Allergen Reactions     Darvon [Propoxyphene]      Had reaction in teen years     Ketoconazole Rash     Penicillins      As a child      Social History:     Social History     Tobacco Use     Smoking status: Never Smoker     Smokeless tobacco: Never Used   Substance Use Topics     Alcohol use: No     Family History:  "    The patient's family history is notable for the following.  Family History   Problem Relation Age of Onset     Cancer Maternal Grandmother         lymphoma     Heart Disease Father         MI     Uterine Cancer Niece      Physical Exam:   /71   Pulse 78   Temp 97.8  F (36.6  C) (Oral)   Resp 14   Ht 1.581 m (5' 2.24\")   Wt 53.6 kg (118 lb 1.6 oz)   LMP  (LMP Unknown)   SpO2 97%   BMI 21.43 kg/m    Body mass index is 21.43 kg/m .    General Appearance: healthy and alert      no distress     HEENT:  no thyromegaly    no palpable nodules or masses        Cardiovascular: regular rate and rhythm     Respiratory: lungs clear  no rales, ronchi or wheezes, normal diaphragmatic excursion    Musculoskeletal: Mild extremity swelling of left leg, left greater than right; wearing compression stockings. Approx 2-3 cm fixed, firm mass on medial aspect of right knee. Varicose veins noticeable on legs; veins do not appear to be part of mass.     Neurological: normal gait   no gross defects     Psychiatric: appropriate mood and affect                   Hematological: normal cervical lymph nodes   normal superclavicular lymph nodes   normal inguinal  lymph nodes     Gastrointestinal:       abdomen soft, non-tender, non-distended , no organomegaly. no palpable masses.     Genitourinary: Partial fusion of labia majora and minora with atrophic changes. Atrophy of clitoral randall with thick, white sebaceous appearing material. Vagina is smooth without nodularity or masses. Cervix with atrophic changes, and without lesions (significant pain on speculum exam).  Bimanual exam reveal no masses, nodularity or fullness (pain with bimanual exam).    Assessment:    Elizabeth Taylor is a 79 year old woman with a new diagnosis of enlarging left adnexal cyst some pressure in abdomen. Clitoral randall atrophy.    Plan:     1.)   Can see patient yearly with imaging. If mass continues to increase in size would recommend removal if " necessary however patient is asymptomatic now except for mild pressure and with current age would not recommend intervention at this time.    -clitoral randall atrophy, pain with exam. Given 2% lidocaine to use locally to see if can wash area out in bathtub.   -Rx for estrogen cream locally to clitoral randall due to atrophy and pain.     2.) Genetic risk factors were assessed and the patient does meet the qualifications for referral.  The patient does decline referral.    3.) Labs and/or tests ordered include: , pelvic US or yearly MRI if she is getting these routinely with colorectal for her anal cancer.      Thank you for allowing us to participate in the care of your patient.         Sincerely,      Nicol Goldman MD    Department of Ob/Gyn and Women's Health  Division of Gynecologic Oncology  [facility]  [phone]

## 2020-03-02 ENCOUNTER — OFFICE VISIT (OUTPATIENT)
Dept: RADIATION ONCOLOGY | Facility: CLINIC | Age: 80
End: 2020-03-02
Attending: RADIOLOGY
Payer: MEDICARE

## 2020-03-02 VITALS — BODY MASS INDEX: 21.41 KG/M2 | HEART RATE: 80 BPM | RESPIRATION RATE: 16 BRPM | WEIGHT: 118 LBS

## 2020-03-02 DIAGNOSIS — C21.1 MALIGNANT NEOPLASM OF ANAL CANAL (H): Primary | ICD-10-CM

## 2020-03-02 NOTE — PROGRESS NOTES
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 Staging evaluation revealed a 2.5 cm mass located approximately 1 cm from the anal verge with involvement of the internal anal sphincter but not the external anal sphincter or levator ani. Two FDG-avid left inguinal nodes were also appreciated.  She received curative-intent chemoradiotherapy as described above with concurrent mitomycin-C and 5-FU. Mitomycin was held during the second cycle of systemic therapy due to concern for treatment-induced toxicities given her age and borderline functional status.    Ms. Taylor returns to radiation oncology clinic today for a routine post-treatment disease surveillance visit. She is having 3 soft but formed bowel movements per day and she specifically denies any tenesmus or hematochezia. She does have some mild urinary urgency during the day which is not particularly bothersome for her however she does experience fairly regular incontinence at night which has been unchanged over the past several months. She denies any dysuria or hematuria.  Regarding her remaining ROS, she has a good appetite and denies any nausea/vomiting, abdominal pain or early satiety. She reports poor compliance with her  provided vaginal dilator and states that she occasionally uses her finger for dilation.    She was also seen by Dr. Rosas in late January  for ongoing anal cancer surveillance and repeat exam including anoscopy and imaging was negative for recurrent disease.  MRI on 20 showed no evidence of residual or recurrent disease.  Increase in size of previously noted left adnexal cyst.  She did see Dr. Goldman on  and recommended yearly imaging and no intervention currently.  She was also given estrogen cream to use and she has been using this and it sounds like she has been using it frequently instead of once a day.  She was concerned with her incontinence that it wasn't working.    She also states she has a new nodule on the inner aspect of her right knee.  She has pointed this out to other providers and they don't know what it is.    PHYSICAL EXAM:  Pulse 80   Resp 16   Wt 53.5 kg (118 lb)   LMP  (LMP Unknown)   BMI 21.41 kg/m      General: Pleasant 79 year old female sitting comfortably in the examination chair in NAD  Pulmonary: No wheezing, stridor or respiratory distress  Cardiovascular: Extremities are warm and well-perfused. No pedal edema.   GI/: Cutaneous changes related to prior radiotherapy with mixed hyper- and hypopigmentation involving the perianal skin, perineum and bilateral groin (left > right). No anal masses. No palpable adenopathy within the bilateral groins.  She still has debride in clitoral randall.  Skin: Cutaneous changes within the prior radiation treatment portal as described above.  There is a nodule in the medial aspect of right knee that is of the skin, mobile but firm and flesh colored.  Non tender to palpation.    LABS AND IMAGIN20 MRI pelvis:  No evidence of residual disease    2019 CT chest/abdomen/pelvis:  No evidence of metastatic disease    IMPRESSION:   Ms. 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 She is currently 2 years out from the completion of treatment and remains clinically MARGARET.    PLAN:   1. Follow-up in radiation oncology clinic with MD in 6 months for ongoing disease surveillance.  2. Continue follow-up with colorectal surgery and medical oncology as scheduled  3. Recommended increased vaginal dilator use to minimize further vaginal stenosis (she admits to never using her dilator but rarely will insert her fingers).  4. Skin nodule in inner aspect of right knee- I am unsure what this is.  Recommended she follow up with her primary for further recommendations,.    Joanie Gomez NP  Department of Radiation Oncology  [facility]

## 2020-03-02 NOTE — LETTER
3/2/2020       RE: Elizabeth Taylor  [address]  [facility] [address]     Dear Colleague,    Thank you for referring your patient, Elizabeth Taylor, to the RADIATION ONCOLOGY CLINIC. Please see a copy of my visit note below.       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 Staging evaluation revealed a 2.5 cm mass located approximately 1 cm from the anal verge with involvement of the internal anal sphincter but not the external anal sphincter or levator ani. Two FDG-avid left inguinal nodes were also appreciated.  She received curative-intent chemoradiotherapy as described above with concurrent mitomycin-C and 5-FU. Mitomycin was held during the second cycle of systemic therapy due to concern for treatment-induced toxicities given her age and borderline functional status.    Ms. Taylor returns to radiation oncology clinic today for a routine post-treatment disease surveillance visit. She is having 3 soft but formed bowel movements per day and she specifically denies any tenesmus or hematochezia. She does have some mild urinary urgency during the day which is not particularly bothersome for her however she does experience fairly regular incontinence at night  which has been unchanged over the past several months. She denies any dysuria or hematuria.  Regarding her remaining ROS, she has a good appetite and denies any nausea/vomiting, abdominal pain or early satiety. She reports poor compliance with her provided vaginal dilator and states that she occasionally uses her finger for dilation.    She was also seen by Dr. Rosas in late January  for ongoing anal cancer surveillance and repeat exam including anoscopy and imaging was negative for recurrent disease.  MRI on 1/9/20 showed no evidence of residual or recurrent disease.  Increase in size of previously noted left adnexal cyst.  She did see Dr. Goldman on 2/27 and recommended yearly imaging and no intervention currently.  She was also given estrogen cream to use and she has been using this and it sounds like she has been using it frequently instead of once a day.  She was concerned with her incontinence that it wasn't working.    She also states she has a new nodule on the inner aspect of her right knee.  She has pointed this out to other providers and they don't know what it is.    PHYSICAL EXAM:  Pulse 80   Resp 16   Wt 53.5 kg (118 lb)   LMP  (LMP Unknown)   BMI 21.41 kg/m       General: Pleasant 79 year old female sitting comfortably in the examination chair in NAD  Pulmonary: No wheezing, stridor or respiratory distress  Cardiovascular: Extremities are warm and well-perfused. No pedal edema.   GI/: Cutaneous changes related to prior radiotherapy with mixed hyper- and hypopigmentation involving the perianal skin, perineum and bilateral groin (left > right). No anal masses. No palpable adenopathy within the bilateral groins.  She still has debride in clitoral randall.  Skin: Cutaneous changes within the prior radiation treatment portal as described above.  There is a nodule in the medial aspect of right knee that is of the skin, mobile but firm and flesh colored.  Non tender to palpation.    LABS AND  IMAGIN20 MRI pelvis:  No evidence of residual disease    2019 CT chest/abdomen/pelvis:  No evidence of metastatic disease    IMPRESSION:   Ms. 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 She is currently 2 years out from the completion of treatment and remains clinically MARGARET.    PLAN:   1. Follow-up in radiation oncology clinic with MD in 6 months for ongoing disease surveillance.  2. Continue follow-up with colorectal surgery and medical oncology as scheduled  3. Recommended increased vaginal dilator use to minimize further vaginal stenosis (she admits to never using her dilator but rarely will insert her fingers).  4. Skin nodule in inner aspect of right knee- I am unsure what this is.  Recommended she follow up with her primary for further recommendations,.    Joanie Gomez NP  Department of Radiation Oncology  [facility]

## 2020-03-05 ENCOUNTER — NURSE TRIAGE (OUTPATIENT)
Dept: NURSING | Facility: CLINIC | Age: 80
End: 2020-03-05

## 2020-03-05 DIAGNOSIS — N95.2 ATROPHIC VAGINITIS: ICD-10-CM

## 2020-03-05 DIAGNOSIS — N89.5 VAGINAL STENOSIS: ICD-10-CM

## 2020-03-05 DIAGNOSIS — C21.1 MALIGNANT NEOPLASM OF ANAL CANAL (H): Primary | ICD-10-CM

## 2020-03-05 RX ORDER — ESTRADIOL 0.1 MG/G
CREAM VAGINAL
Qty: 42.5 G | Refills: 0 | Status: SHIPPED | OUTPATIENT
Start: 2020-03-05 | End: 2022-04-18

## 2020-03-05 RX ORDER — LIDOCAINE HYDROCHLORIDE 20 MG/ML
JELLY TOPICAL PRN
Qty: 5 ML | Refills: 1 | Status: SHIPPED | OUTPATIENT
Start: 2020-03-05 | End: 2020-03-11

## 2020-03-05 NOTE — TELEPHONE ENCOUNTER
Elizabeth asks if she has refills left of her gabapentin:       Disp Refills Start End MARYLU   gabapentin (NEURONTIN) 100 MG capsule 90 capsule 3 12/3/2019  No   Sig - Route: Take 1-3 capsules (100-300 mg) by mouth nightly as needed (muscle spasms) - Oral     FNA called  pharmacy, informed that she still has 1 refill left. FNA informed patient and she will have friend pick it up for her. She knows of her appointment on March 11 with Santiago ROMAN and will discuss lump on right knee and request refill for gabapentin.       Marcy Camejo RN/[facility] Nurse Advisor    Reason for Disposition    Caller has medication question only, adult not sick, and triager answers question    Protocols used: MEDICATION QUESTION CALL-A-

## 2020-03-11 ENCOUNTER — OFFICE VISIT (OUTPATIENT)
Dept: FAMILY MEDICINE | Facility: CLINIC | Age: 80
End: 2020-03-11
Payer: MEDICARE

## 2020-03-11 VITALS
TEMPERATURE: 97.9 F | OXYGEN SATURATION: 98 % | DIASTOLIC BLOOD PRESSURE: 77 MMHG | SYSTOLIC BLOOD PRESSURE: 111 MMHG | RESPIRATION RATE: 16 BRPM | WEIGHT: 116.6 LBS | BODY MASS INDEX: 21.16 KG/M2 | HEART RATE: 80 BPM

## 2020-03-11 DIAGNOSIS — M25.512 LEFT SHOULDER PAIN, UNSPECIFIED CHRONICITY: ICD-10-CM

## 2020-03-11 DIAGNOSIS — Z85.9 PERSONAL HISTORY OF MALIGNANT NEOPLASM: ICD-10-CM

## 2020-03-11 DIAGNOSIS — N95.2 ATROPHIC VAGINITIS: ICD-10-CM

## 2020-03-11 DIAGNOSIS — N89.5 VAGINAL STENOSIS: ICD-10-CM

## 2020-03-11 DIAGNOSIS — R22.41 LUMP OF SKIN OF LOWER EXTREMITY, RIGHT: Primary | ICD-10-CM

## 2020-03-11 PROCEDURE — 99214 OFFICE O/P EST MOD 30 MIN: CPT | Performed by: PHYSICIAN ASSISTANT

## 2020-03-11 RX ORDER — LIDOCAINE HYDROCHLORIDE 20 MG/ML
JELLY TOPICAL PRN
Qty: 5 ML | Refills: 1 | Status: SHIPPED | OUTPATIENT
Start: 2020-03-11 | End: 2020-03-11

## 2020-03-11 RX ORDER — LIDOCAINE HYDROCHLORIDE 20 MG/ML
JELLY TOPICAL PRN
Qty: 5 ML | Refills: 1 | Status: SHIPPED | OUTPATIENT
Start: 2020-03-11 | End: 2020-08-25

## 2020-03-11 NOTE — PROGRESS NOTES
Subjective     Elizabeth Taylor is a 79 year old female who presents to clinic today for the following health issues:    HPI   LUMP:    Duration: Noticed 1 week ago, has likely been there longer     Description (location/character/radiation): Medial aspect right lower leg     Intensity:  Mild    No pain, no redness or warmth, no swelling    No recent long distance travel, no history of DVT/PE    No chest pain or shortness of breath    SHOULDER:     Chronic left shoulder pain     PT has helped in the past    OTHER:  Would like refill of topical lidocaine      Review of Systems   ROS COMP: Constitutional, HEENT, cardiovascular, pulmonary, gi and gu systems are negative, except as otherwise noted.      Objective    /77   Pulse 80   Temp 97.9  F (36.6  C)   Resp 16   Wt 52.9 kg (116 lb 9.6 oz)   LMP  (LMP Unknown)   SpO2 98%   BMI 21.16 kg/m    Body mass index is 21.16 kg/m .  Physical Exam  Vitals signs and nursing note reviewed.   Constitutional:       Appearance: Normal appearance.   HENT:      Head: Normocephalic and atraumatic.   Cardiovascular:      Comments: Varicose veins bilateral lower extremities   Pulmonary:      Effort: Pulmonary effort is normal.   Musculoskeletal:      Right lower leg: She exhibits no tenderness. No edema.      Left lower leg: She exhibits no tenderness. No edema.        Legs:    Skin:     General: Skin is warm and dry.   Neurological:      Mental Status: She is alert and oriented to person, place, and time.   Psychiatric:         Mood and Affect: Mood normal.         Behavior: Behavior normal.        Diagnostic Test Results:  Labs reviewed in Epic  No results found for this or any previous visit (from the past 24 hour(s)).        Assessment & Plan     1. Lump of skin of lower extremity, right  - Do not suspect DVT, ddx includes varicose vein, phlebolith, lipoma. Discussed with patient and her friend Heather. Will place order for ultrasound of RLE for further  characterization.   - US Extremity Non Vascular Right; Future    2. Left shoulder pain, unspecified chronicity  - Chronic problem for patient, would like to follow up with PT for further care   - PHYSICAL THERAPY REFERRAL; Future    3. Malignant neoplasm of anal canal (H)  - lidocaine (XYLOCAINE) 2 % external gel; Apply topically as needed for moderate pain Apply small amount to vaginal area as needed for pain  Dispense: 5 mL; Refill: 1    4. Atrophic vaginitis  - lidocaine (XYLOCAINE) 2 % external gel; Apply topically as needed for moderate pain Apply small amount to vaginal area as needed for pain  Dispense: 5 mL; Refill: 1    5. Vaginal stenosis  - lidocaine (XYLOCAINE) 2 % external gel; Apply topically as needed for moderate pain Apply small amount to vaginal area as needed for pain  Dispense: 5 mL; Refill: 1       Patient Instructions   To schedule your ultrasound at the [facility] call [phone].    Follow up with physical therapy for your shoulder pain.          Return for physical therapy.    Jann Henderson PA-C  [facility]      Patient comes in today to establish care and discuss multiple concerns. I spent 40 minutes with her, of which 30 minutes was spent in counseling and coordinating care. I discussed low likelihood that this is a DVT or infectious process given absences of pain, redness, warmth. Ultrasound to further help characterize the firm area of skin if not improving or worsening. Also discussed chronic shoulder pain and advised to follow up closely with physical therapy.

## 2020-03-13 NOTE — PATIENT INSTRUCTIONS
To schedule your ultrasound at the [facility] call [phone].    Follow up with physical therapy for your shoulder pain.

## 2020-03-20 RX ORDER — LIDOCAINE HYDROCHLORIDE 20 MG/ML
JELLY TOPICAL ONCE
Status: DISCONTINUED | OUTPATIENT
Start: 2020-03-20 | End: 2020-03-20 | Stop reason: HOSPADM

## 2020-04-08 ENCOUNTER — NURSE TRIAGE (OUTPATIENT)
Dept: NURSING | Facility: CLINIC | Age: 80
End: 2020-04-08

## 2020-04-08 NOTE — TELEPHONE ENCOUNTER
Patient called because of spasms in her thighs that occur during her sleep. She's been taking gabapentin and it helps her and she needs a refill. She has completed her third refill. Her PCP is Dr. Jann Henderson @ [facility] [place].    Patient transferred to   for telephone visit.    Patient verbalized understanding with care advice and telephone visit.    Alva Salvador RN    COVID 19 Nurse Triage Plan/Patient Instructions    Please be aware that novel coronavirus (COVID-19) may be circulating in the community. If you develop symptoms such as fever, cough, or SOB or if you have concerns about the presence of another infection including coronavirus (COVID-19), please contact your health care provider or visit [email or web address].     Disposition/Instructions    Patient to have scheduled Telephone Visit with a provider. Follow System Ambulatory Workflow for COVID 19.     The clinic staff will assist you to schedule an appointment to complete the Telephone Visit with a provider during normal clinic hours.       Call Back If: Your symptoms worsen before you are able to complete your Telephone Visit with a provider.    Thank you for limiting contact with others, wearing a simple mask to cover your cough, practice good hand hygiene habits and accessing our virtual services where possible to limit the spread of this virus.    For more information about COVID19 and options for caring for yourself at home, please visit the CDC website at https://www.cdc.gov/coronavirus/2019-ncov/about/steps-when-sick.html  For more options for care at [facility], please visit our website at [email or web address]    For more information, please use the [place] Department of Health ([facility]) COVID-19 Hotlines (Interpreters available):     Health questions: Phone Number: [phone] or [phone] and Hours: 7 a.m. to 7 p.m.    Schools and  questions: Phone Number: [phone]  "or [phone] and Hours 7 a.m. to 7 p.m.    Reason for Disposition    Caller has NON-URGENT medication question about med that PCP prescribed and triager unable to answer question    Answer Assessment - Initial Assessment Questions  1. SYMPTOMS: \"Do you have any symptoms?\"      No worsening of thigh spasms  2. SEVERITY: If symptoms are present, ask \"Are they mild, moderate or severe?\"      Mild, but some nights moderate    Protocols used: MEDICATION QUESTION CALL-A-AH      "

## 2020-04-09 ENCOUNTER — TELEPHONE (OUTPATIENT)
Dept: FAMILY MEDICINE | Facility: CLINIC | Age: 80
End: 2020-04-09

## 2020-04-09 ENCOUNTER — VIRTUAL VISIT (OUTPATIENT)
Dept: FAMILY MEDICINE | Facility: CLINIC | Age: 80
End: 2020-04-09
Payer: MEDICARE

## 2020-04-09 VITALS — BODY MASS INDEX: 20.24 KG/M2 | HEIGHT: 62 IN | WEIGHT: 110 LBS

## 2020-04-09 DIAGNOSIS — M62.838 MUSCLE SPASMS OF BOTH LOWER EXTREMITIES: Primary | ICD-10-CM

## 2020-04-09 DIAGNOSIS — Z23 NEED FOR VACCINATION AGAINST STREPTOCOCCUS PNEUMONIAE: ICD-10-CM

## 2020-04-09 PROCEDURE — 99442 ZZC PHYSICIAN TELEPHONE EVALUATION 11-20 MIN: CPT | Performed by: FAMILY MEDICINE

## 2020-04-09 RX ORDER — GABAPENTIN 100 MG/1
100-400 CAPSULE ORAL
Qty: 240 CAPSULE | Refills: 1 | Status: SHIPPED | OUTPATIENT
Start: 2020-04-09 | End: 2020-10-06

## 2020-04-09 ASSESSMENT — MIFFLIN-ST. JEOR: SCORE: 926.02

## 2020-04-09 NOTE — Clinical Note
Can you please call patient to schedule video or phone medicare wellness visit in about 3 weeks, with Jann Henderson?  Thanks much! Dr. Olive Luo MD / [facility]

## 2020-04-09 NOTE — PATIENT INSTRUCTIONS
1. I refilled your gabapentin for you.  2. When the covid restrictions are lifted, please come in to clinic for your pneumonia vaccine and consider the zoster (shingles) vaccine.  3.

## 2020-04-09 NOTE — PROGRESS NOTES
"Subjective     Elizabeth Taylor is a 79 year old female who is being evaluated via a billable telephone visit.      The patient has been notified of following:     \"This telephone visit will be conducted via a call between you and your physician/provider. We have found that certain health care needs can be provided without the need for a physical exam.  This service lets us provide the care you need with a short phone conversation.  If a prescription is necessary we can send it directly to your pharmacy.  If lab work is needed we can place an order for that and you can then stop by our lab to have the test done at a later time.    Telephone visits are billed at different rates depending on your insurance coverage. During this emergency period, for some insurers they may be billed the same as an in-person visit.  Please reach out to your insurance provider with any questions.    If during the course of the call the physician/provider feels a telephone visit is not appropriate, you will not be charged for this service.\"    Patient has given verbal consent for Telephone visit?  Yes    How would you like to obtain your AVS? Mail a copy    Elizabeth Taylor complains of   Chief Complaint   Patient presents with     Medication Problem       ALLERGIES  Darvon [propoxyphene]; Ketoconazole; and Penicillins     7:14 AM    Muscle spasms on top of thighs happens between 9:30-10:00PM at night until morning, Rx gabapentin by previous physician which helped her Sx.    79 year old new to me today.  Has had this issue for years.  Tops of thighs at muscles spasm.  For whatever reason, it starts at night when supposed to be sleeping.  Varies.  Now going on longer, thinks because nervous about issues with covid 19 pandemic.  Really bad this week with left leg.  Thinks it might be related when broke muscles when healing from radiation.    History of squamous cell carcinoma of anal canal, completed radiation and chemo 2 years " "ago.    History of gabapentin use.  It helps in middle of night.  Takes it almost every night, but waits until 8-9 pm.  Waits to see if it will be bad or not.  If has less activity is worse.    Some nights only takes 3.  Most has ever taken is 4.  Least is 2.     Also - declines flu shot \"I never get the flu\" and has \"never heard about the pneumonia shot, I don't think I'll get pneumonia\".      Reviewed and updated as needed this visit by Provider  Tobacco  Allergies  Meds  Problems  Med Hx  Surg Hx  Fam Hx         Review of Systems   ROS COMP: Constitutional, HEENT, cardiovascular, pulmonary, gi and gu systems are negative, except as otherwise noted.       Objective   Reported vitals:  Ht 1.581 m (5' 2.24\")   Wt 49.9 kg (110 lb)   LMP  (LMP Unknown)   Breastfeeding No   BMI 19.96 kg/m     healthy, alert and no distress  Psych: Alert and oriented times 3; coherent speech, normal   rate and volume, able to articulate logical thoughts, able   to abstract reason, no tangential thoughts, no hallucinations   or delusions  Her affect is upbeat           Assessment/Plan:  1. Muscles Spasms of both lower extremities  2. Preventative care discussed - need immunizations    Has used gabapentin to good success in past, 200-400 mg nightly.    Refilled.    We also discussed immunizations and medicare wellness exam - she will discuss with her PCP next visit, I strongly recommend she consider pneumonia vaccine.    Discussed with patient, all questions answered, in agreement with this plan, will return or seek further care if not improving or worsening.  - gabapentin (NEURONTIN) 100 MG capsule; Take 1-4 capsules (100-400 mg) by mouth nightly as needed (muscle spasms)  Dispense: 240 capsule; Refill: 1    Return in about 3 weeks (around 4/30/2020) for Medicare Annual Wellness Exam.      Phone call duration:  15 minutes    7:29 AM    Olive Luo MD      "

## 2020-04-09 NOTE — TELEPHONE ENCOUNTER
Olive Luo MD  P  Grand     Can you please call patient to schedule video or phone medicare wellness visit in about 3 weeks, with Jann Henderson?  Thanks much! Dr. Olive Luo MD / [facility]    I called and scheduled pt on 04/30/2020 for telephone visit. (Pt does not have a smart phone or computer)       Vangie Beltre MA

## 2020-04-30 ENCOUNTER — VIRTUAL VISIT (OUTPATIENT)
Dept: FAMILY MEDICINE | Facility: CLINIC | Age: 80
End: 2020-04-30
Payer: MEDICARE

## 2020-04-30 DIAGNOSIS — Z51.81 ENCOUNTER FOR THERAPEUTIC DRUG MONITORING: Primary | ICD-10-CM

## 2020-04-30 PROCEDURE — 99443 ZZC PHYSICIAN TELEPHONE EVALUATION 21-30 MIN: CPT | Performed by: PHYSICIAN ASSISTANT

## 2020-04-30 RX ORDER — ACETAMINOPHEN 500 MG
1000 TABLET ORAL 3 TIMES DAILY
Qty: 180 TABLET | Refills: 6 | Status: CANCELLED | OUTPATIENT
Start: 2020-04-30

## 2020-04-30 NOTE — PROGRESS NOTES
"Elizabeth Taylor is a 79 year old female who is being evaluated via a billable telephone visit.      The patient has been notified of following:     \"This telephone visit will be conducted via a call between you and your physician/provider. We have found that certain health care needs can be provided without the need for a physical exam.  This service lets us provide the care you need with a short phone conversation.  If a prescription is necessary we can send it directly to your pharmacy.  If lab work is needed we can place an order for that and you can then stop by our lab to have the test done at a later time.    Telephone visits are billed at different rates depending on your insurance coverage. During this emergency period, for some insurers they may be billed the same as an in-person visit.  Please reach out to your insurance provider with any questions.    If during the course of the call the physician/provider feels a telephone visit is not appropriate, you will not be charged for this service.\"    Patient has given verbal consent for Telephone visit?  Yes    How would you like to obtain your AVS? MyChart    Subjective     Elizabeth Taylor is a 79 year old female who presents to clinic today for the following health issues:    HPI  Medication Follow-up    Taking Medication as prescribed: yes    Side Effects:  None    Medication Helping Symptoms:  Yes    Physically feeling a lot better    Wanting to check in today, not needing refills on medications.        Review of Systems   ROS COMP: Constitutional, HEENT, cardiovascular, pulmonary, gi and gu systems are negative, except as otherwise noted.       Objective   Reported vitals:  LMP  (LMP Unknown)    healthy, alert and no distress  PSYCH: Alert and oriented times 3; coherent speech, normal   rate and volume, able to articulate logical thoughts, able   to abstract reason, no tangential thoughts, no hallucinations   or delusions  Her affect is " normal  RESP: No cough, no audible wheezing, able to talk in full sentences  Remainder of exam unable to be completed due to telephone visits    Diagnostic Test Results:  Labs reviewed in Epic        Assessment/Plan:  1. Encounter for therapeutic drug monitoring  - She is not needing any refills today. Wanted to check in and let me know she is doing well and feeling improved since last visit. We will plan to have face to face medicare annual wellness visit later this year post-COVID. If needing refills between now and then I will bridge the gap.       Return in about 6 months (around 10/30/2020) for Routine Visit, Physical Exam, Lab Work.      Phone call duration:  34 minutes    Jann Henderson PA-C

## 2020-06-08 ENCOUNTER — HOSPITAL ENCOUNTER (OUTPATIENT)
Dept: MRI IMAGING | Facility: CLINIC | Age: 80
Discharge: HOME | End: 2020-06-08
Attending: COLON & RECTAL SURGERY | Admitting: COLON & RECTAL SURGERY
Payer: MEDICARE

## 2020-06-08 DIAGNOSIS — C21.1 MALIGNANT NEOPLASM OF ANAL CANAL (H): ICD-10-CM

## 2020-06-08 LAB
CREAT BLD-MCNC: 0.6 MG/DL (ref 0.52–1.04)
GFR SERPL CREATININE-BSD FRML MDRD: >90 ML/MIN/{1.73_M2}

## 2020-06-08 PROCEDURE — 72197 MRI PELVIS W/O & W/DYE: CPT

## 2020-06-08 PROCEDURE — 25500064 ZZH RX 255 OP 636: Performed by: COLON & RECTAL SURGERY

## 2020-06-08 PROCEDURE — A9585 GADOBUTROL INJECTION: HCPCS | Performed by: COLON & RECTAL SURGERY

## 2020-06-08 PROCEDURE — 82565 ASSAY OF CREATININE: CPT

## 2020-06-08 RX ORDER — GADOBUTROL 604.72 MG/ML
0.1 INJECTION INTRAVENOUS ONCE
Status: COMPLETED | OUTPATIENT
Start: 2020-06-08 | End: 2020-06-08

## 2020-06-08 RX ADMIN — GADOBUTROL 5 ML: 604.72 INJECTION INTRAVENOUS at 16:25

## 2020-06-12 ENCOUNTER — HOSPITAL ENCOUNTER (OUTPATIENT)
Dept: PET IMAGING | Facility: CLINIC | Age: 80
Discharge: HOME | End: 2020-06-12
Attending: COLON & RECTAL SURGERY | Admitting: COLON & RECTAL SURGERY
Payer: MEDICARE

## 2020-06-12 DIAGNOSIS — C21.1 MALIGNANT NEOPLASM OF ANAL CANAL (H): ICD-10-CM

## 2020-06-12 PROCEDURE — 34300033 ZZH RX 343: Performed by: COLON & RECTAL SURGERY

## 2020-06-12 PROCEDURE — 25000128 H RX IP 250 OP 636: Performed by: COLON & RECTAL SURGERY

## 2020-06-12 PROCEDURE — A9552 F18 FDG: HCPCS | Performed by: COLON & RECTAL SURGERY

## 2020-06-12 PROCEDURE — 74177 CT ABD & PELVIS W/CONTRAST: CPT

## 2020-06-12 RX ORDER — IOPAMIDOL 755 MG/ML
10-140 INJECTION, SOLUTION INTRAVASCULAR ONCE
Status: COMPLETED | OUTPATIENT
Start: 2020-06-12 | End: 2020-06-12

## 2020-06-12 RX ADMIN — IOPAMIDOL 68 ML: 755 INJECTION, SOLUTION INTRAVENOUS at 13:11

## 2020-06-12 RX ADMIN — FLUDEOXYGLUCOSE F-18 10.24 MCI.: 500 INJECTION, SOLUTION INTRAVENOUS at 12:14

## 2020-06-12 NOTE — PROGRESS NOTES
"Colon and Rectal Surgery Follow-up Clinic Note    RE: Elizabeth Taylor  : 1940  SALBADOR: 6/15/2020    DIAGNOSIS: mT2N1 anal canal squamous cell carcinoma (s/p CXRT [5,400 cGy], completed on 3/23/18).    INTERVAL HISTORY: Denies increased pain, fevers, or chills. Tolerating diet well. Having 3-4 mushy BMs per day. No BPR. Does have persistent fecal urgency and incontinence is improving, and mainly to liquid stool. Wears depends for this.      Physical Examination:  BP (!) 148/84 (BP Location: Left arm, Patient Position: Sitting, Cuff Size: Adult Small)   Pulse 84   Ht 5' 2\"   Wt 117 lb 4.8 oz   LMP  (LMP Unknown)   SpO2 97%   BMI 21.45 kg/m    Abdomen soft, NT. No inguinal adenopathy palpated.  Perianal skin with no excoriation. Stable hyperpigmentation. Less indurated and no evidence of active infection.  BRITTNEY: no lesions palpated.  Flexible sigmoidoscopy: after obtaining informed consent and performing a \"time out\", an adult flexible sigmoidoscope was introduced through the anus and passed up to the mid sigmoid colon. The quality of the prep was good. Findings: 2 cm left anterior flat white scar right at the dentate line with no evidence of recurrent neoplasia.  Mild post radiation proctitis.  No additional abnormalities were seen. Total scope time: 12 minutes. The patient tolerated the procedure well.    ASSESSMENT  Positive response to CXRT. No evidence of persistent or recurrent neoplasia.    CEA: 2.0.    PET CT 20:   IMPRESSION: In this patient with history of anal canal malignancy:  1. No evidence of metastatic disease.  2. Chronic L3 compression fracture. No associated hypermetabolic  disease.  3. Stable subcentimeter solid pulmonary nodules, unchanged since  comparison 2018.     3T MR pelvis 2020  IMPRESSION:   1. No evidence of local recurrence or metastatic disease in the  pelvis. The right pelvis is obscured by susceptibility artifact  related to the patient's right hip " arthroplasty.   2. Slightly increased size of the left adnexal cysts. Recommend  continued annual follow-up of these cysts with pelvic ultrasound  versus attention on follow up pelvic MRI.  3. Unchanged left pubic rami fractures.    PLAN  1.  Needs new CEA level.    2.  3T MR pelvis and Flex Sig in 12/2020.  3.  PET/CT in 6/2021.  4.  Colonoscopy in 2022.  5.  Follow-up and surveillance for left ovarian cyst with Dr. Goldman.    Time spent: 30 minutes.  >50% spent in discussing, counseling and coordinating care.    Emir Rosas M.D., M.Sc.     Division of Colon and Rectal Surgery  [facility]    Referring Provider:  Felisha Davis, CHAYITO  [address] [place] [address]     CC:  MD Zaire Mcleod MD Melissa Geller, MD

## 2020-06-15 ENCOUNTER — OFFICE VISIT (OUTPATIENT)
Dept: SURGERY | Facility: CLINIC | Age: 80
End: 2020-06-15
Payer: MEDICARE

## 2020-06-15 VITALS
SYSTOLIC BLOOD PRESSURE: 148 MMHG | DIASTOLIC BLOOD PRESSURE: 84 MMHG | BODY MASS INDEX: 21.59 KG/M2 | WEIGHT: 117.3 LBS | HEART RATE: 84 BPM | OXYGEN SATURATION: 97 % | HEIGHT: 62 IN

## 2020-06-15 DIAGNOSIS — C21.1 MALIGNANT NEOPLASM OF ANAL CANAL (H): Primary | ICD-10-CM

## 2020-06-15 ASSESSMENT — MIFFLIN-ST. JEOR: SCORE: 955.32

## 2020-06-15 ASSESSMENT — PAIN SCALES - GENERAL: PAINLEVEL: NO PAIN (0)

## 2020-06-15 NOTE — NURSING NOTE
"Chief Complaint   Patient presents with     Surgical Followup     Follow up       Vitals:    06/15/20 1508   BP: (!) 148/84   BP Location: Left arm   Patient Position: Sitting   Cuff Size: Adult Small   Pulse: 84   SpO2: 97%   Weight: 117 lb 4.8 oz   Height: 5' 2\"       Body mass index is 21.45 kg/m .      Piter Guerrero LPN                        "

## 2020-06-15 NOTE — PATIENT INSTRUCTIONS
Follow up:  1. 3T MR pelvis and flex sig in 12/2020.  2. PET/CT in 6/2021.  3. Colonoscopy in 2022.    Please call with any questions or concerns regarding your clinic visit today.    It is a pleasure being involved in your health care.    Contacts post-consultation depending on your need:    Radiology Appointments [phone]    Schedule Clinic Appointments [phone] # 1   M-F 7:30 - 5 pm    MADELEINE Mock [phone]    Clinic Fax Number [phone]    Surgery Scheduling [phone]    My Chart is available 24 hours a day and is a secure way to access your records and communicate with your care team.  I strongly recommend signing up if you haven't already done so, if you are comfortable with computers.  If you would like to inquire about this or are having problems with My Chart access, you may call [phone] or go online at [email or web address].  Please allow at least 24 hours for a response and extra time on weekends and Holidays.

## 2020-06-15 NOTE — LETTER
"6/15/2020       RE: Elizabeth Taylor  [address]  [place] [address]     Dear Colleague,    Thank you for referring your patient, Elizabeth Taylor, to the [facility] COLON AND RECTAL SURGERY at [facility]. Please see a copy of my visit note below.    Colon and Rectal Surgery Follow-up Clinic Note    RE: Elizabeth Taylor  : 1940  SALBADOR: 6/15/2020    DIAGNOSIS: mT2N1 anal canal squamous cell carcinoma (s/p CXRT [5,400 cGy], completed on 3/23/18).    INTERVAL HISTORY: Denies increased pain, fevers, or chills. Tolerating diet well. Having 3-4 mushy BMs per day. No BPR. Does have persistent fecal urgency and incontinence is improving, and mainly to liquid stool. Wears depends for this.      Physical Examination:  BP (!) 148/84 (BP Location: Left arm, Patient Position: Sitting, Cuff Size: Adult Small)   Pulse 84   Ht 5' 2\"   Wt 117 lb 4.8 oz   LMP  (LMP Unknown)   SpO2 97%   BMI 21.45 kg/m    Abdomen soft, NT. No inguinal adenopathy palpated.  Perianal skin with no excoriation. Stable hyperpigmentation. Less indurated and no evidence of active infection.  BRITTNEY: no lesions palpated.  Flexible sigmoidoscopy: after obtaining informed consent and performing a \"time out\", an adult flexible sigmoidoscope was introduced through the anus and passed up to the mid sigmoid colon. The quality of the prep was good. Findings: 2 cm left anterior flat white scar right at the dentate line with no evidence of recurrent neoplasia.  Mild post radiation proctitis.  No additional abnormalities were seen. Total scope time: 12 minutes. The patient tolerated the procedure well.    ASSESSMENT  Positive response to CXRT. No evidence of persistent or recurrent neoplasia.    CEA: 2.0.    PET CT 20:   IMPRESSION: In this patient with history of anal canal malignancy:  1. No evidence of metastatic disease.  2. Chronic L3 compression fracture. No associated " hypermetabolic  disease.  3. Stable subcentimeter solid pulmonary nodules, unchanged since  comparison 1/18/2018.     3T MR pelvis 6/8/2020  IMPRESSION:   1. No evidence of local recurrence or metastatic disease in the  pelvis. The right pelvis is obscured by susceptibility artifact  related to the patient's right hip arthroplasty.   2. Slightly increased size of the left adnexal cysts. Recommend  continued annual follow-up of these cysts with pelvic ultrasound  versus attention on follow up pelvic MRI.  3. Unchanged left pubic rami fractures.    PLAN  1.  Needs new CEA level.    2.  3T MR pelvis and Flex Sig in 12/2020.  3.  PET/CT in 6/2021.  4.  Colonoscopy in 2022.  5.  Follow-up and surveillance for left ovarian cyst with Dr. Goldman.    Time spent: 30 minutes.  >50% spent in discussing, counseling and coordinating care.    Emir Rosas M.D., M.Sc.     Division of Colon and Rectal Surgery  [facility]    Referring Provider:  Felisha Davis, [place]  [address] [place] [address]     CC:  MD Zaire Mcleod MD Melissa Geller, MD

## 2020-06-22 ENCOUNTER — TELEPHONE (OUTPATIENT)
Dept: SURGERY | Facility: CLINIC | Age: 80
End: 2020-06-22

## 2020-06-22 NOTE — TELEPHONE ENCOUNTER
Lvm x 1 Called and left mary adolfo vm regarding the lab orders. I do see the pt has [facility] set up for 08/28 so I am unaware if she is going to wait until then I did advise her that the labs were wanted in 1-2 weeks.

## 2020-07-10 ENCOUNTER — TELEPHONE (OUTPATIENT)
Dept: FAMILY MEDICINE | Facility: CLINIC | Age: 80
End: 2020-07-10

## 2020-07-10 DIAGNOSIS — M62.838 MUSCLE SPASMS OF BOTH LOWER EXTREMITIES: ICD-10-CM

## 2020-07-10 NOTE — TELEPHONE ENCOUNTER
Reason for Call:  Other prescription    Detailed comments: patient needs a refill for:    gabapentin (NEURONTIN) 100 MG capsule     [facility] - [place], [place]    Patient will schedule an in person visit soon with Jann Henderson, after she arranges her transportation.    Phone Number Patient can be reached at: Home number on file [phone] (home)    Best Time: asap    Can we leave a detailed message on this number? YES    Call taken on 7/10/2020 at 2:01 PM by Sierra Jensen

## 2020-07-10 NOTE — TELEPHONE ENCOUNTER
There is a refill available a the pharmacy.  They will get it ready for patient.  Will be available this afternoon.  Cristal Núñez RN

## 2020-08-21 ENCOUNTER — TELEPHONE (OUTPATIENT)
Dept: ONCOLOGY | Facility: CLINIC | Age: 80
End: 2020-08-21

## 2020-08-21 NOTE — TELEPHONE ENCOUNTER
Patient placed call to writer to discuss concerns about changes to anatomy of vaginal/clitoral/labial area but was unsure of how to describe. Patient has complex issues related to radiation treatment for anal cancer, per patient. Patient would like to know what the normal, healthy genitalia looks like for females to compare to and offered her friend Katherine Canales as an email option for photos. Writer discussed with Bessy Scott RN, who will place call to patient.

## 2020-08-24 NOTE — PROGRESS NOTES
"Elizabeth Taylor is a 80 year old female who is being evaluated via a billable telephone visit.      The patient has been notified of following:     \"This telephone visit will be conducted via a call between you and your physician/provider. We have found that certain health care needs can be provided without the need for a physical exam.  This service lets us provide the care you need with a short phone conversation.  If a prescription is necessary we can send it directly to your pharmacy.  If lab work is needed we can place an order for that and you can then stop by our lab to have the test done at a later time.    Telephone visits are billed at different rates depending on your insurance coverage. During this emergency period, for some insurers they may be billed the same as an in-person visit.  Please reach out to your insurance provider with any questions.    If during the course of the call the physician/provider feels a telephone visit is not appropriate, you will not be charged for this service.\"    Patient has given verbal consent for Telephone visit?  Yes    What phone number would you like to be contacted at? [phone]    How would you like to obtain your AVS? Senaithart    Phone call duration: 24 minutes         Department of Radiation Oncology  [address] [address] [phone]       Radiation Oncology Follow-up Visit  Aug 25, 2020    Elizabeth Taylor  MRN: [id number]   : 1940     DISEASE TREATED:   cT2 N1a M0 squamous cell carcinoma of the anal canal    RADIATION THERAPY DELIVERED:   5400 cGy delivered over 30 once-daily fractions, from 2018 - 3/23/2018    SYSTEMIC THERAPY:  Mitomycin-C and 5-FU    INTERVAL SINCE COMPLETION OF RADIATION THERAPY:   Approximately 2.5 years    SUBJECTIVE:   Elizabeth Taylor is an 80 year old female with a PMH significant for a locally advanced squamous cell carcinoma of the anal canal " "after she presented with progressive anal pain and small amounts of bright red blood per rectum. Staging evaluation revealed a 2.5 cm mass located approximately 1 cm from the anal verge with involvement of the internal anal sphincter but not the external anal sphincter or levator ani. Two FDG-avid left inguinal nodes were also appreciated.  She received curative-intent chemoradiotherapy as described above with concurrent mitomycin-C and 5-FU. Mitomycin was held during the second cycle of systemic therapy due to concern for treatment-induced toxicities given her age and borderline functional status.    She was last seen by Dr. Rosas in colorectal surgery on 6/15/2020 at which time flexible sigmoidoscopy showed a 2 cm scar at the dentate line but no evidence of recurrent disease. PET/CT on 2020 and MRI pelvis on 2020 showed no evidence of metastatic or recurrent disease.     Ms. Taylor is being called for a routine post-treatment disease surveillance visit. She has a CT CAP scheduled for 2020. She reports things are stable with the exception of a new, non-tender nodule \"above the urethra\" that may be clitoral. She noticed it about 2 weeks ago but is unsure how long it has been present. She sees Dr. Goldman in gyn-onc for it on 9/10/2020. She otherwise is doing well. She denies lymphadenopathy, fatigue, weight loss, hematochezia, or pain with stooling. She has rectal urgency about an hour after eating breakfast and lunch and has well-formed soft stools.     LABS AND IMAGIN20 MRI pelvis:  No evidence of residual disease    2019 CT chest/abdomen/pelvis:  No evidence of metastatic disease    IMPRESSION:   Ms. 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 She is currently 2.5 years out from the completion of treatment and remains clinically MARGARET, though she does reportedly have a new elfego-genital " lesion.    PLAN:   1. Follow-up in radiation oncology clinic with NP in 6 months and MD visit in 12 months for ongoing disease surveillance  2. Continue follow-up with colorectal surgery and medical oncology as scheduled  3. CT CAP on 8/28/2020, MR pelvis and flexible sigmoidoscopy ordered for 12/2020 by Dr. Rosas  4. Follow up with gyn-onc regarding new elfego-urethral lesion    Sharif Navas MD  PGY-4 Radiation Oncology  Clinic: [phone]  Pager: [phone]      Attending addendum:   I saw and examined the patient with the resident and agree with the documented plan of care.    Ms. Taylor is an 80 year old female that is approximately 2.5 years out from chemoradiation for treatment of a cT2 N1a M0 squamous cell carcinoma of the anal canal. By phone she reports that she is doing well and continues to make a gradual recovery in her strength. She is walking around her home including 30 minutes of stairs approximately 4 times daily. She continues to have regular nocturnal urinary incontinence and has been getting up every 2 hours over night to empty her bladder to mitigate her symptoms. She is having 2-3 formed stools daily and denies any tenesmus or blood within her stools. She has a good appetite and is able to eat anything she wants without difficulty.     She does report a new periurethral nodule that has been present for approximately the past month and is scheduled to see Dr. Goldman in early September for evaluation.     I will have her return to radiation oncology clinic in 6 months for a NP visit and in 12 months with a MD visit.    Zaire Madison MD/PhD    Dept of Radiation Oncology  [facility]

## 2020-08-25 ENCOUNTER — VIRTUAL VISIT (OUTPATIENT)
Dept: RADIATION ONCOLOGY | Facility: CLINIC | Age: 80
End: 2020-08-25
Attending: RADIOLOGY
Payer: MEDICARE

## 2020-08-25 DIAGNOSIS — C21.1 MALIGNANT NEOPLASM OF ANAL CANAL (H): Primary | ICD-10-CM

## 2020-08-25 NOTE — PROGRESS NOTES
FOLLOW-UP VISIT    Patient Name: Elizabeth Taylor      : 1940     Age: 80 year old        ______________________________________________________________________________     Chief Complaint   Patient presents with     Cancer     Telephone Follow up:Anal 5400 cGy completed on 3/23/18     Pain  Denies    Labs  Other Labs: No    Imaging  None      Other Appointments:     MD Name: Dr Goldman Appointment Date: 09/10/20   MD Name: Appointment Date:   MD Name: Appointment Date:   Other Appointment Notes:     Residual Radiation side effect: No concerns related to radiation     Additional Instructions:     Nurse face-to-face time: Level 3:  10 min face to face time

## 2020-08-28 ENCOUNTER — ANCILLARY PROCEDURE (OUTPATIENT)
Dept: CT IMAGING | Facility: CLINIC | Age: 80
End: 2020-08-28
Attending: INTERNAL MEDICINE
Payer: MEDICARE

## 2020-08-28 DIAGNOSIS — C21.1 MALIGNANT NEOPLASM OF ANAL CANAL (H): ICD-10-CM

## 2020-08-28 LAB
ALBUMIN SERPL-MCNC: 3.9 G/DL (ref 3.4–5)
ALP SERPL-CCNC: 88 U/L (ref 40–150)
ALT SERPL W P-5'-P-CCNC: 21 U/L (ref 0–50)
ANION GAP SERPL CALCULATED.3IONS-SCNC: 5 MMOL/L (ref 3–14)
AST SERPL W P-5'-P-CCNC: 16 U/L (ref 0–45)
BILIRUB SERPL-MCNC: 1.4 MG/DL (ref 0.2–1.3)
BUN SERPL-MCNC: 19 MG/DL (ref 7–30)
CALCIUM SERPL-MCNC: 9.2 MG/DL (ref 8.5–10.1)
CEA SERPL-MCNC: 2.1 UG/L (ref 0–2.5)
CHLORIDE SERPL-SCNC: 109 MMOL/L (ref 94–109)
CO2 SERPL-SCNC: 27 MMOL/L (ref 20–32)
CREAT BLD-MCNC: 0.6 MG/DL (ref 0.52–1.04)
CREAT SERPL-MCNC: 0.6 MG/DL (ref 0.52–1.04)
DIFFERENTIAL METHOD BLD: NORMAL
EOSINOPHIL # BLD AUTO: 0.2 10E9/L (ref 0–0.7)
EOSINOPHIL NFR BLD AUTO: 4 %
ERYTHROCYTE [DISTWIDTH] IN BLOOD BY AUTOMATED COUNT: 13.1 % (ref 10–15)
GFR SERPL CREATININE-BSD FRML MDRD: 85 ML/MIN/{1.73_M2}
GFR SERPL CREATININE-BSD FRML MDRD: >90 ML/MIN/{1.73_M2}
GLUCOSE SERPL-MCNC: 84 MG/DL (ref 70–99)
HCT VFR BLD AUTO: 39.4 % (ref 35–47)
HGB BLD-MCNC: 12.9 G/DL (ref 11.7–15.7)
LYMPHOCYTES # BLD AUTO: 1.3 10E9/L (ref 0.8–5.3)
LYMPHOCYTES NFR BLD AUTO: 30 %
MCH RBC QN AUTO: 31.3 PG (ref 26.5–33)
MCHC RBC AUTO-ENTMCNC: 32.7 G/DL (ref 31.5–36.5)
MCV RBC AUTO: 96 FL (ref 78–100)
MONOCYTES # BLD AUTO: 0.4 10E9/L (ref 0–1.3)
MONOCYTES NFR BLD AUTO: 9 %
NEUTROPHILS # BLD AUTO: 2.5 10E9/L (ref 1.6–8.3)
NEUTROPHILS NFR BLD AUTO: 57 %
PLATELET # BLD AUTO: 176 10E9/L (ref 150–450)
POTASSIUM SERPL-SCNC: 3.9 MMOL/L (ref 3.4–5.3)
PROT SERPL-MCNC: 7.1 G/DL (ref 6.8–8.8)
RBC # BLD AUTO: 4.12 10E12/L (ref 3.8–5.2)
SODIUM SERPL-SCNC: 141 MMOL/L (ref 133–144)
WBC # BLD AUTO: 4.4 10E9/L (ref 4–11)

## 2020-08-28 PROCEDURE — 25000128 H RX IP 250 OP 636: Performed by: INTERNAL MEDICINE

## 2020-08-28 PROCEDURE — 85025 COMPLETE CBC W/AUTO DIFF WBC: CPT | Performed by: COLON & RECTAL SURGERY

## 2020-08-28 PROCEDURE — 82378 CARCINOEMBRYONIC ANTIGEN: CPT | Performed by: COLON & RECTAL SURGERY

## 2020-08-28 PROCEDURE — 80053 COMPREHEN METABOLIC PANEL: CPT | Performed by: COLON & RECTAL SURGERY

## 2020-08-28 RX ORDER — IOPAMIDOL 755 MG/ML
72 INJECTION, SOLUTION INTRAVASCULAR ONCE
Status: COMPLETED | OUTPATIENT
Start: 2020-08-28 | End: 2020-08-28

## 2020-08-28 RX ORDER — HEPARIN SODIUM (PORCINE) LOCK FLUSH IV SOLN 100 UNIT/ML 100 UNIT/ML
500 SOLUTION INTRAVENOUS ONCE
Status: COMPLETED | OUTPATIENT
Start: 2020-08-28 | End: 2020-08-28

## 2020-08-28 RX ORDER — HEPARIN SODIUM (PORCINE) LOCK FLUSH IV SOLN 100 UNIT/ML 100 UNIT/ML
5 SOLUTION INTRAVENOUS ONCE
Status: COMPLETED | OUTPATIENT
Start: 2020-08-28 | End: 2020-08-28

## 2020-08-28 RX ADMIN — Medication 5 ML: at 09:16

## 2020-08-28 RX ADMIN — IOPAMIDOL 72 ML: 755 INJECTION, SOLUTION INTRAVASCULAR at 09:51

## 2020-08-28 RX ADMIN — HEPARIN SODIUM (PORCINE) LOCK FLUSH IV SOLN 100 UNIT/ML 500 UNITS: 100 SOLUTION at 10:35

## 2020-08-28 NOTE — NURSING NOTE
Chief Complaint   Patient presents with     Port Draw     Labs drawn via port by RN in lab.      Port accessed with 20 gauge flat needle by RN, labs collected, line flushed with saline and heparin.     Randi Vicente, RN

## 2020-08-28 NOTE — DISCHARGE INSTRUCTIONS

## 2020-09-08 ENCOUNTER — VIRTUAL VISIT (OUTPATIENT)
Dept: ONCOLOGY | Facility: CLINIC | Age: 80
End: 2020-09-08
Attending: PHYSICIAN ASSISTANT
Payer: MEDICARE

## 2020-09-08 DIAGNOSIS — M43.9 CURVATURE OF SPINE: ICD-10-CM

## 2020-09-08 DIAGNOSIS — C21.1 MALIGNANT NEOPLASM OF ANAL CANAL (H): Primary | ICD-10-CM

## 2020-09-08 PROCEDURE — 40001009 ZZH VIDEO/TELEPHONE VISIT; NO CHARGE

## 2020-09-08 PROCEDURE — 99443 ZZC PHYSICIAN TELEPHONE EVALUATION 21-30 MIN: CPT | Mod: 95 | Performed by: PHYSICIAN ASSISTANT

## 2020-09-08 NOTE — LETTER
"9/8/2020     RE: Elizabeth Taylor  [address]  [place] [address]    Dear Colleague,    Thank you for referring your patient, Elizabeth Taylor, to the [facility]. Please see a copy of my visit note below.    Elizabeth Taylor is a 80 year old female who is being evaluated via a billable telephone visit.      The patient has been notified of following:     \"This telephone visit will be conducted via a call between you and your physician/provider. We have found that certain health care needs can be provided without the need for a physical exam.  This service lets us provide the care you need with a short phone conversation.  If a prescription is necessary we can send it directly to your pharmacy.  If lab work is needed we can place an order for that and you can then stop by our lab to have the test done at a later time.    Telephone visits are billed at different rates depending on your insurance coverage. During this emergency period, for some insurers they may be billed the same as an in-person visit.  Please reach out to your insurance provider with any questions.    If during the course of the call the physician/provider feels a telephone visit is not appropriate, you will not be charged for this service.\"    Patient has given verbal consent for Telephone visit?  Yes    What phone number would you like to be contacted at? [phone]    How would you like to obtain your AVS? Miguel Angel     I have reviewed and updated the patient's allergies and medication list. Patient was asked to provide any patient recorded vital signs, height and/or weight.  Please see in \"Patient Reported Vital Signs\" tab information.      Concerns: Questions about medication options for muscle spasms.    Refills: None     DARVIN Fields    Phone call duration: 38 minutes    HAIDER vAina    Oncology/Hematology Visit Note  Sep 8, 2020    Reason for Visit: Follow up of anal " cancer    History of Present Illness: Elizabeth Taylor is a 80 year old female with a history of SCC of the anal canal, stage T2N1 IIIA. Her oncologic history is as follows:     Developed bright red blood per rectum started about 4 years ago and progressively got worse. She underwent a colonoscopy in April 2017 which noted to have small anal fissure along with internal hemorrhoids. Symptoms continued and  she was referred to colorectal surgery for further evaluation. On exam she was found to have an 1.5 cm anal lesion on the left side along with left groin palpable adenopathy. Biopsy of anal mass came back positive for squamous cell carcinoma. Patient underwent staging workup with MRI pelvis and a PET scan- showed PET avid left anal canal mass along with small left inguinal FDG avid lymph nodes.      02/12/18- Started on concurrent chemoradiation with 5FU/Mitomycin     03/13/18 Skipped Mitomycin and proceeded with 5FU at reduced dose given neutropenia/old age     03/19/18 -04/04/18 admitted to the [facility] with intractable diarrhea, nausea, generalized weakness and fall at home resulting in multiple right rib fractures. Hospital course was complicated by small bowel obstruction for which patient was started on TPN. Bowel obstruction was managed conservatively eventually improved prior to discharge . 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 She was eventually discharged to transitional care unit.     05/25/18 05/25/18 MRI pelvis and PET scan done showed near complete resolution of patient's known anal lesion with no evidence for recurrence or metastatic disease.     07/25/18-07/30/18 Admitted to the hospital with traumatic right femoral neck fracture secondary to fall. She underwent right hemiarthroplasty and was discharged to transitional care facility. Also noted to have a C. difficile infection and was started on oral vancomycin  course.     11/1/18-11/5/18 admitted to hospital with a fall and left hip pain found to have pelvic ring fracture. Treated conservatively with pain control, PT/OT, and recommendations for PCP follow-up with DEXA scan and osteoporosis work-up.      She has had no signs of disease recurrence since that completion of treatment. Most recent imaging (MR and PET) June 2020 with no evidence of recurrent disease, did have slight increase in adnexal cyst. Repeat CT 8/28/20 with no evidence of recurrent disease and stable cyst.     She was called today for oncology follow-up.     Interval History:  Elizabeth was called today for oncology follow-up and we discussed the following:  -She is having more normal bowel movements now and has better control. No abdominal pain or blood in stools  -She still struggles with urinary incontinence over night but this is improving and she has less skin breakdown. No new urinary concerns  -She has a discolored external vaginal lesion she is concerned about and plans to discuss with Dr. Goldman in gyn tomorrow  -She has muscle spasms overnight that sometimes make it hard to sleep but she is worried about being over medicated  -She had swelling in her bilateral feet but this is improved. She does however have ongoing nonblanchable purple discoloration which she noted early summer, doesn't improve with elevation, no pain or warmth, feet cool but not cold.  -She notes her spine is very curved and wonders what she can do about this  -She is walking much better and feels her strength is improving. Also notes mental fogginess is improving. Eating and drinking well, weight stable.   -She is working on her financial situation and plans to know more this fall  -She denies fevers, respiratory concerns, nausea/vomiting    Current Outpatient Medications   Medication Sig Dispense Refill     acetaminophen (TYLENOL) 500 MG tablet Take 1,000 mg by mouth 3 times daily       Calcium-Magnesium-Zinc 333-133-5 MG TABS  per tablet Take 1 tablet by mouth daily       cholecalciferol (VITAMIN D3) 5000 units (125 mcg) capsule 1 CAP BY MOUTH DAILY (DX: OSTEOPOROSIS) 90 capsule 3     conjugated estrogens (PREMARIN) 0.625 MG/GM vaginal cream Apply locally to clitoral randall pea sized amount daily for 2 weeks then prn. 4 g 0     docusate sodium (COLACE) 100 MG capsule Take 1 capsule (100 mg) by mouth 2 times daily 60 capsule      estradiol (ESTRACE) 0.1 MG/GM vaginal cream Apply locally to clitoral randall pea sized amount daily for 2 weeks then prn. 42.5 g 0     gabapentin (NEURONTIN) 100 MG capsule Take 1-4 capsules (100-400 mg) by mouth nightly as needed (muscle spasms) 240 capsule 1     metroNIDAZOLE (METROGEL) 0.75 % external gel Apply topically 2 times daily 45 g 3     vitamin B complex with vitamin C (VITAMIN  B COMPLEX) TABS tablet Take 1 tablet by mouth daily       VITAMIN E PO Take 1 capsule by mouth daily       Physical Examination:  No vitals for today's visit  Wt Readings from Last 10 Encounters:   06/15/20 53.2 kg (117 lb 4.8 oz)   04/09/20 49.9 kg (110 lb)   03/11/20 52.9 kg (116 lb 9.6 oz)   03/02/20 53.5 kg (118 lb)   02/27/20 53.6 kg (118 lb 1.6 oz)   02/24/20 53.1 kg (117 lb)   01/27/20 54 kg (119 lb)   08/23/19 53.1 kg (117 lb)   08/23/19 53.2 kg (117 lb 3.2 oz)   07/09/19 53.1 kg (117 lb)     Well sounding female in no acute distress. No audible wheezing or cough. Speech and thought process repetitive at times, can get confused during conversation but easily redirectable. Good voice quality.      Laboratory Data:  Results for SHARLENE GERHARD SANDOVAL (MRN [id number]) as of 9/8/2020 14:26   8/28/2020 09:24   Sodium 141   Potassium 3.9   Chloride 109   Carbon Dioxide 27   Urea Nitrogen 19   Creatinine 0.60   GFR Estimate 85   GFR Estimate If Black >90   Calcium 9.2   Anion Gap 5   Albumin 3.9   Protein Total 7.1   Bilirubin Total 1.4 (H)   Alkaline Phosphatase 88   ALT 21   AST 16   Glucose 84   WBC 4.4   Hemoglobin 12.9    Hematocrit 39.4   Platelet Count 176   RBC Count 4.12   MCV 96   MCH 31.3   MCHC 32.7   RDW 13.1   Diff Method Automated Method   % Neutrophils 57.0   % Lymphocytes 30.0   % Monocytes 9.0   % Eosinophils 4.0   Absolute Neutrophil 2.5   Absolute Lymphocytes 1.3   Absolute Monocytes 0.4   Absolute Eosinophils 0.2   CEA 2.1     CT CAP 8/28/20  FINDINGS:     Chest: Right IJ catheter terminates in the right atrium. The thyroid  is unremarkable. No abnormal thoracic lymphadenopathy. Heart size is  mildly enlarged. Small amount of pericardial fluid. No central  pulmonary embolism. The esophagus is patulous at the midthoracic  level, stable.     The central tracheobronchial tree is patent. No pleural effusion.  Scattered calcified granulomas. 4 mm nodule, possibly partially  calcified, and the anterior left lower lobe on series 4 image 199,  stable compared to 1/18/2018. No new or concerning pulmonary nodule.     Abdomen and pelvis: Pelvic findings limited due to beam hardening  artifact from right hip arthroplasty.     Stable hyperattenuating focus in hepatic segment IVb measuring up to 6  mm. Mild central hepatic intrahepatic leg dilatation, stable. The  spleen, adrenals, and pancreas are unremarkable. Cholelithiasis.  Punctate stone in the lower pole of the left kidney. No  hydronephrosis. Stable small bilateral renal hypodensities, likely  simple cysts. No abnormally dilated loops of large or small bowel. No  definite free fluid in the pelvis although this evaluation is obscured  by right hip arthroplasty. 4.3 cm cystic focus in the left adnexa, not  substantially changed. Mild atherosclerotic calcification of the  abdominal aorta without abnormal dilatation. No abnormal pelvic,  retroperitoneal, or mesenteric lymphadenopathy.     Bones and soft tissues: Stable fat density focus in segment with the  right gluteus minimus measuring up to 4.7 cm. Right hip  hemiarthroplasty. Unchanged chronic deformities of the left  superior  and inferior pubic rami. Sclerosis of the sacroiliac joints, left  greater than right, stable.  2. L3 compression deformity. Biconvex coronal curvature of the spine.                                                                      IMPRESSION: No evidence of recurrent or residual disease in the chest,  abdomen, or pelvis.      ANGELA HORNE MD (Joe)      Assessment and Plan:  1. Onc  Anal cancer s/p chemoradiation completed 2018 with no signs of disease recurrence. Reviewed CT imaging today which confirmed no signs of recurrent disease which she was happy to hear. She still is recovering from treatment toxicities (weakness, bowel and bladder control) but this continues to improve. Labs including CEA stable-she has had slight hyperbilirubinemia in the past and can be monitored.    Will plan to have her see Dr. Jones in December along with Dr. Rosas who recommended MR pelvis and Flex sig at that time.     2. Gyn  Adnexal mass stable, follows with gyn onc and has appointment tomorrow. Will discuss vaginal concerns with them at appointment as well.    3. Ortho  Placed ortho referral for spine concerns and she will call to schedule in future if needed.    Continue to work with PCP on muscle spasms-no additional meds started at this time due to concerns of over sedation.    Continue to exercise at home and build endurance.     4. Vascular  Admits to what sounds like arterial insuffiencey in lower extremities. No symptoms to suggest acute issues. Asked her to schedule with PCP in person to assess and likely will need FRED US. She was agreeable with this and will call if she has any issues scheduling.    iTm Larose PA-C  [facility]  [address] [place] [address]  [phone]

## 2020-09-08 NOTE — PROGRESS NOTES
"Elizabeth Taylor is a 80 year old female who is being evaluated via a billable telephone visit.      The patient has been notified of following:     \"This telephone visit will be conducted via a call between you and your physician/provider. We have found that certain health care needs can be provided without the need for a physical exam.  This service lets us provide the care you need with a short phone conversation.  If a prescription is necessary we can send it directly to your pharmacy.  If lab work is needed we can place an order for that and you can then stop by our lab to have the test done at a later time.    Telephone visits are billed at different rates depending on your insurance coverage. During this emergency period, for some insurers they may be billed the same as an in-person visit.  Please reach out to your insurance provider with any questions.    If during the course of the call the physician/provider feels a telephone visit is not appropriate, you will not be charged for this service.\"    Patient has given verbal consent for Telephone visit?  Yes    What phone number would you like to be contacted at? [phone]    How would you like to obtain your AVS? Miguel Angel     I have reviewed and updated the patient's allergies and medication list. Patient was asked to provide any patient recorded vital signs, height and/or weight.  Please see in \"Patient Reported Vital Signs\" tab information.      Concerns: Questions about medication options for muscle spasms.    Refills: None     DARVIN Fields    Phone call duration: 38 minutes    HAIDER Avina    Oncology/Hematology Visit Note  Sep 8, 2020    Reason for Visit: Follow up of anal cancer    History of Present Illness: Elizabeth Taylor is a 80 year old female with a history of SCC of the anal canal, stage T2N1 IIIA. Her oncologic history is as follows:     Developed bright red blood per rectum started about 4 years ago and progressively " got worse. She underwent a colonoscopy in April 2017 which noted to have small anal fissure along with internal hemorrhoids. Symptoms continued and  she was referred to colorectal surgery for further evaluation. On exam she was found to have an 1.5 cm anal lesion on the left side along with left groin palpable adenopathy. Biopsy of anal mass came back positive for squamous cell carcinoma. Patient underwent staging workup with MRI pelvis and a PET scan- showed PET avid left anal canal mass along with small left inguinal FDG avid lymph nodes.      02/12/18- Started on concurrent chemoradiation with 5FU/Mitomycin     03/13/18 Skipped Mitomycin and proceeded with 5FU at reduced dose given neutropenia/old age     03/19/18 -04/04/18 admitted to the [facility] with intractable diarrhea, nausea, generalized weakness and fall at home resulting in multiple right rib fractures. Hospital course was complicated by small bowel obstruction for which patient was started on TPN. Bowel obstruction was managed conservatively eventually improved prior to discharge . 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 She was eventually discharged to transitional care unit.     05/25/18 05/25/18 MRI pelvis and PET scan done showed near complete resolution of patient's known anal lesion with no evidence for recurrence or metastatic disease.     07/25/18-07/30/18 Admitted to the hospital with traumatic right femoral neck fracture secondary to fall. She underwent right hemiarthroplasty and was discharged to transitional care facility. Also noted to have a C. difficile infection and was started on oral vancomycin course.     11/1/18-11/5/18 admitted to hospital with a fall and left hip pain found to have pelvic ring fracture. Treated conservatively with pain control, PT/OT, and recommendations for PCP follow-up with DEXA scan and osteoporosis work-up.      She has had no signs of disease  recurrence since that completion of treatment. Most recent imaging (MR and PET) June 2020 with no evidence of recurrent disease, did have slight increase in adnexal cyst. Repeat CT 8/28/20 with no evidence of recurrent disease and stable cyst.     She was called today for oncology follow-up.     Interval History:  Elizabeth was called today for oncology follow-up and we discussed the following:  -She is having more normal bowel movements now and has better control. No abdominal pain or blood in stools  -She still struggles with urinary incontinence over night but this is improving and she has less skin breakdown. No new urinary concerns  -She has a discolored external vaginal lesion she is concerned about and plans to discuss with Dr. Goldman in gyn tomorrow  -She has muscle spasms overnight that sometimes make it hard to sleep but she is worried about being over medicated  -She had swelling in her bilateral feet but this is improved. She does however have ongoing nonblanchable purple discoloration which she noted early summer, doesn't improve with elevation, no pain or warmth, feet cool but not cold.  -She notes her spine is very curved and wonders what she can do about this  -She is walking much better and feels her strength is improving. Also notes mental fogginess is improving. Eating and drinking well, weight stable.   -She is working on her financial situation and plans to know more this fall  -She denies fevers, respiratory concerns, nausea/vomiting    Current Outpatient Medications   Medication Sig Dispense Refill     acetaminophen (TYLENOL) 500 MG tablet Take 1,000 mg by mouth 3 times daily       Calcium-Magnesium-Zinc 333-133-5 MG TABS per tablet Take 1 tablet by mouth daily       cholecalciferol (VITAMIN D3) 5000 units (125 mcg) capsule 1 CAP BY MOUTH DAILY (DX: OSTEOPOROSIS) 90 capsule 3     conjugated estrogens (PREMARIN) 0.625 MG/GM vaginal cream Apply locally to clitoral randall pea sized amount daily for 2  weeks then prn. 4 g 0     docusate sodium (COLACE) 100 MG capsule Take 1 capsule (100 mg) by mouth 2 times daily 60 capsule      estradiol (ESTRACE) 0.1 MG/GM vaginal cream Apply locally to clitoral randall pea sized amount daily for 2 weeks then prn. 42.5 g 0     gabapentin (NEURONTIN) 100 MG capsule Take 1-4 capsules (100-400 mg) by mouth nightly as needed (muscle spasms) 240 capsule 1     metroNIDAZOLE (METROGEL) 0.75 % external gel Apply topically 2 times daily 45 g 3     vitamin B complex with vitamin C (VITAMIN  B COMPLEX) TABS tablet Take 1 tablet by mouth daily       VITAMIN E PO Take 1 capsule by mouth daily       Physical Examination:  No vitals for today's visit  Wt Readings from Last 10 Encounters:   06/15/20 53.2 kg (117 lb 4.8 oz)   04/09/20 49.9 kg (110 lb)   03/11/20 52.9 kg (116 lb 9.6 oz)   03/02/20 53.5 kg (118 lb)   02/27/20 53.6 kg (118 lb 1.6 oz)   02/24/20 53.1 kg (117 lb)   01/27/20 54 kg (119 lb)   08/23/19 53.1 kg (117 lb)   08/23/19 53.2 kg (117 lb 3.2 oz)   07/09/19 53.1 kg (117 lb)     Well sounding female in no acute distress. No audible wheezing or cough. Speech and thought process repetitive at times, can get confused during conversation but easily redirectable. Good voice quality.      Laboratory Data:  Results for GERHARD SU (MRN [id number]) as of 9/8/2020 14:26   8/28/2020 09:24   Sodium 141   Potassium 3.9   Chloride 109   Carbon Dioxide 27   Urea Nitrogen 19   Creatinine 0.60   GFR Estimate 85   GFR Estimate If Black >90   Calcium 9.2   Anion Gap 5   Albumin 3.9   Protein Total 7.1   Bilirubin Total 1.4 (H)   Alkaline Phosphatase 88   ALT 21   AST 16   Glucose 84   WBC 4.4   Hemoglobin 12.9   Hematocrit 39.4   Platelet Count 176   RBC Count 4.12   MCV 96   MCH 31.3   MCHC 32.7   RDW 13.1   Diff Method Automated Method   % Neutrophils 57.0   % Lymphocytes 30.0   % Monocytes 9.0   % Eosinophils 4.0   Absolute Neutrophil 2.5   Absolute Lymphocytes 1.3   Absolute Monocytes 0.4    Absolute Eosinophils 0.2   CEA 2.1     CT CAP 8/28/20  FINDINGS:     Chest: Right IJ catheter terminates in the right atrium. The thyroid  is unremarkable. No abnormal thoracic lymphadenopathy. Heart size is  mildly enlarged. Small amount of pericardial fluid. No central  pulmonary embolism. The esophagus is patulous at the midthoracic  level, stable.     The central tracheobronchial tree is patent. No pleural effusion.  Scattered calcified granulomas. 4 mm nodule, possibly partially  calcified, and the anterior left lower lobe on series 4 image 199,  stable compared to 1/18/2018. No new or concerning pulmonary nodule.     Abdomen and pelvis: Pelvic findings limited due to beam hardening  artifact from right hip arthroplasty.     Stable hyperattenuating focus in hepatic segment IVb measuring up to 6  mm. Mild central hepatic intrahepatic leg dilatation, stable. The  spleen, adrenals, and pancreas are unremarkable. Cholelithiasis.  Punctate stone in the lower pole of the left kidney. No  hydronephrosis. Stable small bilateral renal hypodensities, likely  simple cysts. No abnormally dilated loops of large or small bowel. No  definite free fluid in the pelvis although this evaluation is obscured  by right hip arthroplasty. 4.3 cm cystic focus in the left adnexa, not  substantially changed. Mild atherosclerotic calcification of the  abdominal aorta without abnormal dilatation. No abnormal pelvic,  retroperitoneal, or mesenteric lymphadenopathy.     Bones and soft tissues: Stable fat density focus in segment with the  right gluteus minimus measuring up to 4.7 cm. Right hip  hemiarthroplasty. Unchanged chronic deformities of the left superior  and inferior pubic rami. Sclerosis of the sacroiliac joints, left  greater than right, stable.  2. L3 compression deformity. Biconvex coronal curvature of the spine.                                                                      IMPRESSION: No evidence of recurrent or  residual disease in the chest,  abdomen, or pelvis.      ANGELA HORNE MD (Joe)      Assessment and Plan:  1. Onc  Anal cancer s/p chemoradiation completed 2018 with no signs of disease recurrence. Reviewed CT imaging today which confirmed no signs of recurrent disease which she was happy to hear. She still is recovering from treatment toxicities (weakness, bowel and bladder control) but this continues to improve. Labs including CEA stable-she has had slight hyperbilirubinemia in the past and can be monitored.    Will plan to have her see Dr. Jones in December along with Dr. Rosas who recommended MR pelvis and Flex sig at that time.     2. Gyn  Adnexal mass stable, follows with gyn onc and has appointment tomorrow. Will discuss vaginal concerns with them at appointment as well.    3. Ortho  Placed ortho referral for spine concerns and she will call to schedule in future if needed.    Continue to work with PCP on muscle spasms-no additional meds started at this time due to concerns of over sedation.    Continue to exercise at home and build endurance.     4. Vascular  Admits to what sounds like arterial insuffiencey in lower extremities. No symptoms to suggest acute issues. Asked her to schedule with PCP in person to assess and likely will need FRED US. She was agreeable with this and will call if she has any issues scheduling.    Tim Larose PA-C  [facility]  [address] [place] [address] [phone]

## 2020-09-09 ENCOUNTER — VIRTUAL VISIT (OUTPATIENT)
Dept: ONCOLOGY | Facility: CLINIC | Age: 80
End: 2020-09-09
Attending: OBSTETRICS & GYNECOLOGY
Payer: MEDICARE

## 2020-09-09 DIAGNOSIS — C21.1 MALIGNANT NEOPLASM OF ANAL CANAL (H): Primary | ICD-10-CM

## 2020-09-09 DIAGNOSIS — N83.202 CYST OF LEFT OVARY: ICD-10-CM

## 2020-09-09 PROCEDURE — 40001009 ZZH VIDEO/TELEPHONE VISIT; NO CHARGE

## 2020-09-09 PROCEDURE — 99443 ZZC PHYSICIAN TELEPHONE EVALUATION 21-30 MIN: CPT | Mod: 95 | Performed by: OBSTETRICS & GYNECOLOGY

## 2020-09-09 NOTE — PROGRESS NOTES
"Elizabeth Taylor is a 80 year old female who is being evaluated via a billable telephone visit.      The patient has been notified of following:     \"This telephone visit will be conducted via a call between you and your physician/provider. We have found that certain health care needs can be provided without the need for a physical exam.  This service lets us provide the care you need with a short phone conversation.  If a prescription is necessary we can send it directly to your pharmacy.  If lab work is needed we can place an order for that and you can then stop by our lab to have the test done at a later time.    Telephone visits are billed at different rates depending on your insurance coverage. During this emergency period, for some insurers they may be billed the same as an in-person visit.  Please reach out to your insurance provider with any questions.    If during the course of the call the physician/provider feels a telephone visit is not appropriate, you will not be charged for this service.\"    Patient has given verbal consent for Telephone visit?  Yes    What phone number would you like to be contacted at? [phone]    How would you like to obtain your AVS? Mail a copy    Ramonita ROWEJODIE    Phone call duration: 21 minutes                                        Consult Notes on Referred Patient    Date:2020         Emir Rosas MD  [address] [place] [id number]       RE: Elizabeth Taylor  : 1940  [place]: 2020      Dear Emir Rosas:    I had the pleasure of seeing your patient Elizabeth Taylor here at the [facility] on 2020  As you know she is a very pleasant 79 year old woman with a recent diagnosis of an enlarging left adnexal cyst.  Given these findings she was subsequently sent to the Gyn Onc clinic for new patient consultation.     She has been followed by Colorectal Surgery for a history of " "squamous cell carcinoma of the anal canal, for which she completed radiation treatment in March 2018. Her surveillance MRI in May 2018 showed an incidental left adnexal mass with two cystic structures measuring 1.7 and 2.5cm. This mass was unchanged as recently as June 2019. However, the mass was noted to be mildly increased in size on MRI on 1/9/2020 (3.5x3.7cm, from 2.7x3.4cm previously).     Overall feeling well today. She has occasional pelvic pressure, usually on the left; unsure how long this has been going on. An \"awareness\" of something on her left side. No vaginal bleeding. Does use vaginal dilator. No abdominal distension or bloating. No abdominal pain. No abnormal weight changes. Has some extremity swelling, left greater than right, has been that way for a long time. She wears compression stockings. Noticed a lump on the right medial aspect of knee recently; unsure how long it's been there thinks about a couple weeks. Has some discharge but is unsure if this is urethra or from vaginal canal, describes it as \"white, greasy.\"     5/25/2018 MR PELVIS W/O & W CONTRAST  COMPARISON: MRI pelvis 1/12/2018.  IMPRESSION:   1. Interval near resolution of the patient's known left anal mass lesion.  2. No evidence for recurrent or metastatic disease in the pelvis.  3. Stable left ovarian cysts. Recommend monitoring at least on annual basis (on routine anal cancer restaging follow-up or ultrasound pelvis if at a later date restaging is no longer required).    5/25/2018 Combined Report of: PET and CT   1. Decrease in FDG uptake at the site of the primary anal mass. The mass is poorly defined on the accompanying CT. This is consistent with response to therapy.  2. No significant residual increased FDG uptake in the left inguinal nodes.  3. Small focus of uptake adjacent to the urethra without an accompanying lesion on the CT. This is most suspicious for a urethral contamination, possibly within a urethral diverticulum or " a small amount of retained excreted urine tracking up into the vaginal fornix. Recommend attention on follow-up imaging, although alternatively a  repeat MRI of the pelvis may help increase confidence that this finding does not represent small lesion.  8. Redemonstration of the low-density cyst in the left adnexa likely an ovarian cyst, without increased FDG uptake. Continued attention on follow-up imaging is recommended.     01/09/2020 MRI    IMPRESSION:   1. No evidence of residual/recurrent mass  2. No evidence of pelvic metastatic disease.  3. Slightly increased size of a left adnexal cyst since 6/21/2019. No nodular enhancing component. A cyst of this is size warrants yearly ultrasound follow-up.  4. 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  The larger cyst measures 4.2 x 3.6 x 3.7 cm  compared to 3.8 x 3.5 x 3.4 cm previously (series 3, image 17). The  smaller adjacent cyst measures 1.8 x 1.7 x 1.4 cm compared to 1.8 x  1.4 x 1.5 cm previously. No solid component or abnormal enhancement is  appreciated within the cysts.    6/12/2020: CT PET:  Nonhypermetabolic simple appearing 4.2 cm left ovarian cyst.    IMPRESSION: In this patient with history of anal canal malignancy:  1. No evidence of metastatic disease.  2. Chronic L3 compression fracture. No associated hypermetabolic  disease.  3. Stable subcentimeter solid pulmonary nodules, unchanged since  comparison 1/18/2018.    8/28/2020: CT CAP    No definite free fluid in the pelvis although this evaluation is obscured  by right hip arthroplasty. 4.3 cm cystic focus in the left adnexa, not  substantially changed. Mild atherosclerotic calcification of the  abdominal aorta without abnormal dilatation. No abnormal pelvic,  retroperitoneal,  or mesenteric lymphadenopathy.                                                             IMPRESSION: No evidence of recurrent or residual disease in the chest,  abdomen, or pelvis.        Date Value Ref Range Status   02/27/2020 10 0 - 30 U/mL Final     Comment:     Assay Method:  Chemiluminescence using Siemens Centaur XP     Today: Feels that she needs to look at an anatomy book to describe her findings to me today. No pain, doing fine overall. Having trouble describing area around the clitoral randall, had been soaking. Did not  estrogen cream. Was using lidocaine jelly a few times. Does not have pain now. No abdominal discomfort, urinating and having normal BM's. Thinks she needs exam with GYN. Denies vaginal bleeding.      Review of Systems:    Systemic          Weight Changes: No          Fever: no fever        Chills: no chills    Night Sweats: no night sweats               Appetite Change: no appetite changes    Skin           Rashes: no rashes, or lesions  Eye           Irritation: no irritation          Vision Changes: no changes in vision            Romeo-Laryngeal           Dysphagia: no dysphagia          Hoarseness: no hoarseness   Pulmonary           Cough: no cough          SOB no shortness of breath  Cardiovascular           Chest Pain: no chest pain          Palpitations: no palpitations  Gastrointestinal           Diarrhea: no          Constipation: no    Abdominal Pain: no   Bowel Habits: no changes in bowel habits  Blood in Stool: no blood in stool  Genitourinary  Dysuria: no dysuria   Discharge: no abnormal vaginal discharge   Bleeding: no abnormal vaginal bleeding    Some urgency. Occasional urinary and bowel incontinence; especially at night. Wears adult depends, but is usually fine during the day.   Breast   Discharge: no breast discharge     Changes: no breast changes          Pain: no breast pain  Musculoskeletal           Joint Pain: yes - has arthritis    Hematologic            Tenderness: no tender lymph nodes                            Endocrine   Hot Flashes: no hot flashes                               Heat/Cold Intolr: no heat/cold intolerance         Neurological    Numbness:  sometimes numbness and tingling in left arm and hand, but previously had left shoulder problems and it is getting better.    Headaches:  no headaches   Difficulty Sleeping:   Some difficulty sleeping.      Past Medical History:    Past Medical History:   Diagnosis Date     Anal cancer (H)      Endometriosis, site unspecified      Thyroiditis, unspecified     Thryoiditis-resolved     Past Surgical History:    Past Surgical History:   Procedure Laterality Date     APPENDECTOMY      as a child     ARTHROPLASTY HIP Right 2018    Procedure: ARTHROPLASTY HIP;  Right Hip Hemiarthroplasty;  Surgeon: Zaire Phan MD;  Location: [facility] OR     COLONOSCOPY N/A 2017    Procedure: COLONOSCOPY;  Surgeon: Juan Dos Santos MD;  Location: [facility] GI     INSERT PORT VASCULAR ACCESS Right 2018    Procedure: INSERT PORT VASCULAR ACCESS;  Place Single Lumen Venous Chest Port Placement;  Surgeon: Zaire Moreira PA-C;  Location: [facility] OR     SURGICAL HISTORY OF -       endometriosis     Health Maintenance:    Last Pap Smear:    Unsure, thinks she may have had one.     Last Mamogram:       Unsure when last one was.              Last Colonoscopy:  17   Result: rectal tenderness, sigmoid diverticulosis, anal fissure.     Last DEXA Scan:   19       Result: abnormal, osteoporosis with T-score -4 to -6.4.    Last Diabetes Screening;  Unknown.     Last Thyroid Screenin/3/18, TSH 2.84.      Current Medications:     Current Outpatient Medications   Medication Sig Dispense Refill     acetaminophen (TYLENOL) 500 MG tablet Take 1,000 mg by mouth 3 times daily       Calcium-Magnesium-Zinc 333-133-5 MG TABS per tablet Take 1 tablet by mouth daily       cholecalciferol (VITAMIN D3) 5000 units  (125 mcg) capsule 1 CAP BY MOUTH DAILY (DX: OSTEOPOROSIS) 90 capsule 3     gabapentin (NEURONTIN) 100 MG capsule Take 1-3 capsules (100-300 mg) by mouth nightly as needed (muscle spasms) 90 capsule 3     metroNIDAZOLE (METROGEL) 0.75 % external gel Apply topically 2 times daily 45 g 3     vitamin B complex with vitamin C (VITAMIN  B COMPLEX) TABS tablet Take 1 tablet by mouth daily       VITAMIN E PO Take 1 capsule by mouth daily       docusate sodium (COLACE) 100 MG capsule Take 1 capsule (100 mg) by mouth 2 times daily (Patient not taking: Reported on 3/7/2019) 60 capsule      Allergies:     Allergies   Allergen Reactions     Darvon [Propoxyphene]      Had reaction in teen years     Ketoconazole Rash     Penicillins      As a child      Social History:     Social History     Tobacco Use     Smoking status: Never Smoker     Smokeless tobacco: Never Used   Substance Use Topics     Alcohol use: No     Family History:     The patient's family history is notable for the following.  Family History   Problem Relation Age of Onset     Cancer Maternal Grandmother         lymphoma     Heart Disease Father         MI     Uterine Cancer Niece      Physical Exam:   No physical exam or VS done due to virtual visit because of COVID 19.    Patient appears to repeat questions and has to be redirected.      Assessment:    Elizabeth Taylor is a 80 year old woman with left adnexal cyst-overall stable based on recent imaging and appears stable in nature. I do not believe this is malignant. Clitoral randall atrophy.    Plan:     1.)   Asymptomatic from left adnexal cyst. This looks stable overall on recent imaging. Recommend she continue with annual MRI or pelvic US which is being done with colorectal. I am happy to review these in the future.    -clitoral randall atrophy, pain with exam. Given 2% lidocaine to use locally to see if can wash area out in bathtub.   -Will refer to general OB/GYN to continue to follow her as I do not believe  this is concerning for cancer. She does need yearly pelvic examinations and vulvar exams.     2.) Genetic risk factors were assessed and the patient does meet the qualifications for referral.  The patient does decline referral.      Thank you for allowing us to participate in the care of your patient.         Sincerely,      Nicol Goldman MD    Department of Ob/Gyn and Women's Health  Division of Gynecologic Oncology  [facility]  [phone]    Of a 21 minute appointment, the entire time was spent counseling the patient via telephone visit due to COVID 19 to decrease the patient and provider risk to exposure.

## 2020-09-09 NOTE — PATIENT INSTRUCTIONS
No further follow up needed in gyn oncology  See regular gynecology for pelvic exam, scheduling will call you to help make an appointment

## 2020-09-09 NOTE — LETTER
"    2020         RE: Elizabeth Taylor  [address]  [place] [address]        Dear Colleague,    Thank you for referring your patient, Elizabeth Taylor, to the [facility]. Please see a copy of my visit note below.    Elizabeth Taylor is a 80 year old female who is being evaluated via a billable telephone visit.      The patient has been notified of following:     \"This telephone visit will be conducted via a call between you and your physician/provider. We have found that certain health care needs can be provided without the need for a physical exam.  This service lets us provide the care you need with a short phone conversation.  If a prescription is necessary we can send it directly to your pharmacy.  If lab work is needed we can place an order for that and you can then stop by our lab to have the test done at a later time.    Telephone visits are billed at different rates depending on your insurance coverage. During this emergency period, for some insurers they may be billed the same as an in-person visit.  Please reach out to your insurance provider with any questions.    If during the course of the call the physician/provider feels a telephone visit is not appropriate, you will not be charged for this service.\"    Patient has given verbal consent for Telephone visit?  Yes    What phone number would you like to be contacted at? [phone]    How would you like to obtain your AVS? Mail a copy    Ramonita ZHANG    Phone call duration: 21 minutes                                        Consult Notes on Referred Patient    Date:2020         Emir Rosas MD  [address]  [place] [address]       RE: Elizabeth Taylor  : 1940  [place]: 2020      Dear Emir Rosas:    I had the pleasure of seeing your patient Elizabeth Taylor here at the [facility] on 2020  As you know " "she is a very pleasant 79 year old woman with a recent diagnosis of an enlarging left adnexal cyst.  Given these findings she was subsequently sent to the Gyn Onc clinic for new patient consultation.     She has been followed by Colorectal Surgery for a history of squamous cell carcinoma of the anal canal, for which she completed radiation treatment in March 2018. Her surveillance MRI in May 2018 showed an incidental left adnexal mass with two cystic structures measuring 1.7 and 2.5cm. This mass was unchanged as recently as June 2019. However, the mass was noted to be mildly increased in size on MRI on 1/9/2020 (3.5x3.7cm, from 2.7x3.4cm previously).     Overall feeling well today. She has occasional pelvic pressure, usually on the left; unsure how long this has been going on. An \"awareness\" of something on her left side. No vaginal bleeding. Does use vaginal dilator. No abdominal distension or bloating. No abdominal pain. No abnormal weight changes. Has some extremity swelling, left greater than right, has been that way for a long time. She wears compression stockings. Noticed a lump on the right medial aspect of knee recently; unsure how long it's been there thinks about a couple weeks. Has some discharge but is unsure if this is urethra or from vaginal canal, describes it as \"white, greasy.\"     5/25/2018 MR PELVIS W/O & W CONTRAST  COMPARISON: MRI pelvis 1/12/2018.  IMPRESSION:   1. Interval near resolution of the patient's known left anal mass lesion.  2. No evidence for recurrent or metastatic disease in the pelvis.  3. Stable left ovarian cysts. Recommend monitoring at least on annual basis (on routine anal cancer restaging follow-up or ultrasound pelvis if at a later date restaging is no longer required).    5/25/2018 Combined Report of: PET and CT   1. Decrease in FDG uptake at the site of the primary anal mass. The mass is poorly defined on the accompanying CT. This is consistent with response to " therapy.  2. No significant residual increased FDG uptake in the left inguinal nodes.  3. Small focus of uptake adjacent to the urethra without an accompanying lesion on the CT. This is most suspicious for a urethral contamination, possibly within a urethral diverticulum or a small amount of retained excreted urine tracking up into the vaginal fornix. Recommend attention on follow-up imaging, although alternatively a  repeat MRI of the pelvis may help increase confidence that this finding does not represent small lesion.  8. Redemonstration of the low-density cyst in the left adnexa likely an ovarian cyst, without increased FDG uptake. Continued attention on follow-up imaging is recommended.     01/09/2020 MRI    IMPRESSION:   1. No evidence of residual/recurrent mass  2. No evidence of pelvic metastatic disease.  3. Slightly increased size of a left adnexal cyst since 6/21/2019. No nodular enhancing component. A cyst of this is size warrants yearly ultrasound follow-up.  4. 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  The larger cyst measures 4.2 x 3.6 x 3.7 cm  compared to 3.8 x 3.5 x 3.4 cm previously (series 3, image 17). The  smaller adjacent cyst measures 1.8 x 1.7 x 1.4 cm compared to 1.8 x  1.4 x 1.5 cm previously. No solid component or abnormal enhancement is  appreciated within the cysts.    6/12/2020: CT PET:  Nonhypermetabolic simple appearing 4.2 cm left ovarian cyst.    IMPRESSION: In this patient with history of anal canal malignancy:  1. No evidence of metastatic disease.  2. Chronic L3 compression fracture. No associated hypermetabolic  disease.  3. Stable subcentimeter solid pulmonary nodules, unchanged since  comparison 1/18/2018.    8/28/2020: CT CAP    No definite free fluid  in the pelvis although this evaluation is obscured  by right hip arthroplasty. 4.3 cm cystic focus in the left adnexa, not  substantially changed. Mild atherosclerotic calcification of the  abdominal aorta without abnormal dilatation. No abnormal pelvic,  retroperitoneal, or mesenteric lymphadenopathy.                                                             IMPRESSION: No evidence of recurrent or residual disease in the chest,  abdomen, or pelvis.        Date Value Ref Range Status   02/27/2020 10 0 - 30 U/mL Final     Comment:     Assay Method:  Chemiluminescence using Siemens Centaur XP     Today: Feels that she needs to look at an anatomy book to describe her findings to me today. No pain, doing fine overall. Having trouble describing area around the clitoral randall, had been soaking. Did not  estrogen cream. Was using lidocaine jelly a few times. Does not have pain now. No abdominal discomfort, urinating and having normal BM's. Thinks she needs exam with GYN. Denies vaginal bleeding.      Review of Systems:    Systemic          Weight Changes: No          Fever: no fever        Chills: no chills    Night Sweats: no night sweats               Appetite Change: no appetite changes    Skin           Rashes: no rashes, or lesions  Eye           Irritation: no irritation          Vision Changes: no changes in vision            Romeo-Laryngeal           Dysphagia: no dysphagia          Hoarseness: no hoarseness   Pulmonary           Cough: no cough          SOB no shortness of breath  Cardiovascular           Chest Pain: no chest pain          Palpitations: no palpitations  Gastrointestinal           Diarrhea: no          Constipation: no    Abdominal Pain: no   Bowel Habits: no changes in bowel habits  Blood in Stool: no blood in stool  Genitourinary  Dysuria: no dysuria   Discharge: no abnormal vaginal discharge   Bleeding: no abnormal vaginal bleeding    Some urgency. Occasional urinary and bowel  incontinence; especially at night. Wears adult depends, but is usually fine during the day.   Breast   Discharge: no breast discharge     Changes: no breast changes          Pain: no breast pain  Musculoskeletal           Joint Pain: yes - has arthritis    Hematologic           Tenderness: no tender lymph nodes                            Endocrine   Hot Flashes: no hot flashes                               Heat/Cold Intolr: no heat/cold intolerance         Neurological    Numbness:  sometimes numbness and tingling in left arm and hand, but previously had left shoulder problems and it is getting better.    Headaches:  no headaches   Difficulty Sleeping:   Some difficulty sleeping.      Past Medical History:    Past Medical History:   Diagnosis Date     Anal cancer (H)      Endometriosis, site unspecified      Thyroiditis, unspecified     Thryoiditis-resolved     Past Surgical History:    Past Surgical History:   Procedure Laterality Date     APPENDECTOMY      as a child     ARTHROPLASTY HIP Right 2018    Procedure: ARTHROPLASTY HIP;  Right Hip Hemiarthroplasty;  Surgeon: Zaire Phan MD;  [facility]     COLONOSCOPY N/A 2017    Procedure: COLONOSCOPY;  Surgeon: Juan Dos Santos MD;  [facility]     INSERT PORT VASCULAR ACCESS Right 2018    Procedure: INSERT PORT VASCULAR ACCESS;  Place Single Lumen Venous Chest Port Placement;  Surgeon: Zaire Moreira PA-C;  [facility]: [facility]     SURGICAL HISTORY OF -       endometriosis     Health Maintenance:    Last Pap Smear:    Unsure, thinks she may have had one.     Last Mamogram:       Unsure when last one was.              Last Colonoscopy:  17   Result: rectal tenderness, sigmoid diverticulosis, anal fissure.     Last DEXA Scan:   19       Result: abnormal, osteoporosis with T-score -4 to -6.4.    Last Diabetes Screening;  Unknown.     Last Thyroid Screenin/3/18, TSH 2.84.      Current Medications:      Current Outpatient Medications   Medication Sig Dispense Refill     acetaminophen (TYLENOL) 500 MG tablet Take 1,000 mg by mouth 3 times daily       Calcium-Magnesium-Zinc 333-133-5 MG TABS per tablet Take 1 tablet by mouth daily       cholecalciferol (VITAMIN D3) 5000 units (125 mcg) capsule 1 CAP BY MOUTH DAILY (DX: OSTEOPOROSIS) 90 capsule 3     gabapentin (NEURONTIN) 100 MG capsule Take 1-3 capsules (100-300 mg) by mouth nightly as needed (muscle spasms) 90 capsule 3     metroNIDAZOLE (METROGEL) 0.75 % external gel Apply topically 2 times daily 45 g 3     vitamin B complex with vitamin C (VITAMIN  B COMPLEX) TABS tablet Take 1 tablet by mouth daily       VITAMIN E PO Take 1 capsule by mouth daily       docusate sodium (COLACE) 100 MG capsule Take 1 capsule (100 mg) by mouth 2 times daily (Patient not taking: Reported on 3/7/2019) 60 capsule      Allergies:     Allergies   Allergen Reactions     Darvon [Propoxyphene]      Had reaction in teen years     Ketoconazole Rash     Penicillins      As a child      Social History:     Social History     Tobacco Use     Smoking status: Never Smoker     Smokeless tobacco: Never Used   Substance Use Topics     Alcohol use: No     Family History:     The patient's family history is notable for the following.  Family History   Problem Relation Age of Onset     Cancer Maternal Grandmother         lymphoma     Heart Disease Father         MI     Uterine Cancer Niece      Physical Exam:   No physical exam or VS done due to virtual visit because of COVID 19.    Patient appears to repeat questions and has to be redirected.      Assessment:    Elizabeth Taylor is a 80 year old woman with left adnexal cyst-overall stable based on recent imaging and appears stable in nature. I do not believe this is malignant. Clitoral randall atrophy.    Plan:     1.)   Asymptomatic from left adnexal cyst. This looks stable overall on recent imaging. Recommend she continue with annual MRI or  pelvic US which is being done with colorectal. I am happy to review these in the future.    -clitoral randall atrophy, pain with exam. Given 2% lidocaine to use locally to see if can wash area out in bathtub.   -Will refer to general OB/GYN to continue to follow her as I do not believe this is concerning for cancer. She does need yearly pelvic examinations and vulvar exams.     2.) Genetic risk factors were assessed and the patient does meet the qualifications for referral.  The patient does decline referral.      Thank you for allowing us to participate in the care of your patient.         Sincerely,      Nicol Goldman MD    Department of Ob/Gyn and Women's Health  Division of Gynecologic Oncology  [facility]  [phone]    Of a 21 minute appointment, the entire time was spent counseling the patient via telephone visit due to COVID 19 to decrease the patient and provider risk to exposure.      Again, thank you for allowing me to participate in the care of your patient.        Sincerely,        Nicol oGldman MD

## 2020-09-15 DIAGNOSIS — C21.1 MALIGNANT NEOPLASM OF ANAL CANAL (H): Primary | ICD-10-CM

## 2020-09-18 ENCOUNTER — TELEPHONE (OUTPATIENT)
Dept: GASTROENTEROLOGY | Facility: OUTPATIENT CENTER | Age: 80
End: 2020-09-18

## 2020-09-22 ENCOUNTER — PATIENT OUTREACH (OUTPATIENT)
Dept: SURGERY | Facility: CLINIC | Age: 80
End: 2020-09-22

## 2020-09-22 ENCOUNTER — TELEPHONE (OUTPATIENT)
Dept: ONCOLOGY | Facility: CLINIC | Age: 80
End: 2020-09-22

## 2020-09-22 NOTE — PROGRESS NOTES
Discussed with patient about an issue she is having. She doesn't seem to understand what issue she is having. She states she has bruising by her vuvla and was told she needs to figure out if it is cancerous. She states she is having no rectal issues. Sent in basket to Dr. Goldman's nurse.

## 2020-09-22 NOTE — TELEPHONE ENCOUNTER
----- Message from Emili Bañuelos RN sent at 9/22/2020 10:31 AM CDT -----  Regarding: RE: Scheduling  Do you mind calling the patient to go over this plan again with her?    Emili Fried RN  ----- Message -----  From: Katarzyna Scott RN  Sent: 9/22/2020  10:20 AM CDT  To: Emili Bañuelos RN  Subject: RE: Scheduling                                   Dr Goldman spoke with this pt on the telephone for a virtual visit on 9/11  Pt was told to see gyn for an exam, scheduling was asked to call the pt to help make an appt with the gyn at [place].  This pt does not need to continue seeing gyn oncology as she does not have any cancerous process going on from our stand point, she needs regular exams and follow up with gyn  I am assuming she needs to continue following up with you regarding her anal cancer.  I don't see that Dr. Goldman made any notation that  she needed to see you asap so I am not sure what that is about.  I think this pt is very confused as to what is going on with her health and what needs to happen as far as follow up on her various issues    Let me know if I can do anything else  michael  ----- Message -----  From: Emili Bañuelos RN  Sent: 9/22/2020  10:14 AM CDT  To: Katarzyna Scott, MADELEINE, Emili Bañuelos RN  Subject: RE: Scheduling                                   Mario Doherty,     Pt is saying she is concerned about some area in her vulva, like bruising or something? It was very confusing. She said everything is okay with her rectum so I can't help her. Can you follow up with her? She said she needs to follow up with someone to see if that area is cancer?     Emili Fried RN  ----- Message -----  From: Mayra Welch  Sent: 9/18/2020   3:52 PM CDT  To: Emili Bañuelos RN  Subject: RE: Scheduling                                   Cris Mock,     I got a hold of this pt, and she couldn't schedule the labs at that moment, she is calling me back w her sister/ transportation, but she did mention  that she needs to come in and see Dr. Rosas immediately. Apparently she wants to make an appt w Dr. Goldman the gynocologist for an issue, but whatever issue she has with colorectal, she wasn't very descriptive, has gotten much worse. Now Dr. Goldman will not see her unless she has seen Dr. Rosas and has a referral, according to the pt. As I am sure you know, Dr. Rosas is booked out until 11/30, so I was wondering what to do w the pt? Should she take one of the nca spots, could she see Dr. Martinez since he has more availability? I am sorry I dont have more information, she was not very easy to understand. Let me know how I can help.     Thanks,   Laura  ----- Message -----  From: Emili Bañuelos RN  Sent: 9/15/2020   9:11 AM CDT  To: Emili Bañuelos RN, #  Subject: Scheduling                                       Appointment Information / Please Schedule This Appointment For:     With: Dr. Emir Rosas    Referring Provider: self    For / Appt Notes: flex sig follow up from last clinic visit     Appt Type: [facility] Flex Sig    Appt Date/Time: December     Additional Appointments: MRI 3T Pelvis in december before seeing    If Imaging: N/A    Thank you!

## 2020-09-28 ENCOUNTER — TELEPHONE (OUTPATIENT)
Dept: OBGYN | Facility: CLINIC | Age: 80
End: 2020-09-28

## 2020-09-28 NOTE — TELEPHONE ENCOUNTER
Call from patient who reports new dark purple discoloration near the clitoris. Patient is wondering what type of MD would be appropriate to have this evaluated by. Discussed that she can schedule with a gynecologist.     Pt states she wants to call her PCP tomorrow to ask if he can evaluate, if not she will plan to schedule an appointment.    Message sent to  to call patient in 2 days to schedule appointment with an OBGYN.    GRAHAM Manriquez, IFRAH

## 2020-10-03 DIAGNOSIS — M62.838 MUSCLE SPASMS OF BOTH LOWER EXTREMITIES: ICD-10-CM

## 2020-10-06 ENCOUNTER — TELEPHONE (OUTPATIENT)
Dept: FAMILY MEDICINE | Facility: CLINIC | Age: 80
End: 2020-10-06

## 2020-10-06 RX ORDER — GABAPENTIN 100 MG/1
CAPSULE ORAL
Qty: 240 CAPSULE | Refills: 1 | Status: SHIPPED | OUTPATIENT
Start: 2020-10-06 | End: 2021-04-16

## 2020-10-06 NOTE — TELEPHONE ENCOUNTER
Reason for call:  Medication   If this is a refill request, has the caller requested the refill from the pharmacy already? No  Will the patient be using a [facility]? Yes   Name of the pharmacy and phone number for the current request: [facility]    Name of the medication requested:  gabapentin (NEURONTIN) 100 MG capsule.    Other request: n/a    Phone number to reach patient:  Cell number on file:    Telephone Information:   Mobile [phone]       Best Time:  Anytime some time today ( 10/6/20)     Can we leave a detailed message on this number?  YES    Travel screening: Not Applicable

## 2020-10-06 NOTE — PROGRESS NOTES
"[facility]  Gynecology Consult Visit    SUBJECTIVE    Elizabeth Taylor is a 80 year old who is here for vulvar discoloration. She has a history of anal cancer s/p radiation. She has also seen Dr. Goldman with Gyn/Onc for a left adnexal cyst. This has been stable in size; Dr. Goldman recommended annual imaging evaluation as she felt the cancerous potential was very low. She referred Elizabeth to a benign gynecologist for management of clitoral randall atrophy, and for yearly surveillance pelvic and vulvar exams.    Elizabeth states that her vulva has been healing well after the radiation, and she no longer has pain around her vulva or anus. However, she is uncertain what normal female external genitalia should look like for a woman her age after radiation, and she would like to know if the changes she is noticing are normal. One month ago, she noticed a bump or small bruise above her clitoris, that she says \"looks like another clitoris.\" She denies pain or discomfort, but just wants to know what it is. She has been putting bacitracin on it to keep it from getting dry. She also has something white in the area of her clitoris that she thinks might be a piece of cotton from her underpants that is stuck, and she cannot get it out with her fingernail.    She has been using her finger and dilators to keep her vaginal canal patent, as instructed by a physician. She thinks that is going well. She voids spontaneously without difficulty.    Her LMP is: No LMP recorded (lmp unknown). Patient is postmenopausal.    PAST MEDICAL HISTORY  Past Medical History:   Diagnosis Date     Anal cancer (H)      Endometriosis, site unspecified      Thyroiditis, unspecified     Thryoiditis-resolved       MEDICATIONS  Current Outpatient Medications   Medication     acetaminophen (TYLENOL) 500 MG tablet     Calcium-Magnesium-Zinc 333-133-5 MG TABS per tablet     cholecalciferol (VITAMIN D3) 5000 units (125 mcg) capsule     conjugated " estrogens (PREMARIN) 0.625 MG/GM vaginal cream     docusate sodium (COLACE) 100 MG capsule     estradiol (ESTRACE) 0.1 MG/GM vaginal cream     gabapentin (NEURONTIN) 100 MG capsule     metroNIDAZOLE (METROGEL) 0.75 % external gel     vitamin B complex with vitamin C (VITAMIN  B COMPLEX) TABS tablet     VITAMIN E PO     No current facility-administered medications for this visit.        ALLERGIES  Allergies   Allergen Reactions     Darvon [Propoxyphene]      Had reaction in teen years     Ketoconazole Rash     Penicillins      As a child       OBSTETRIC/GYNECOLOGIC HISTORY    OB History   No obstetric history on file.       PAST SURGICAL HISTORY   Past Surgical History:   Procedure Laterality Date     APPENDECTOMY      as a child     ARTHROPLASTY HIP Right 7/26/2018    Procedure: ARTHROPLASTY HIP;  Right Hip Hemiarthroplasty;  Surgeon: Zaire Phan MD;  [facility]     COLONOSCOPY N/A 4/13/2017    Procedure: COLONOSCOPY;  Surgeon: Juan Dos Santos MD;  [facility]     INSERT PORT VASCULAR ACCESS Right 2/8/2018    Procedure: INSERT PORT VASCULAR ACCESS;  Place Single Lumen Venous Chest Port Placement;  Surgeon: Zaire Moreira PA-C;  [facility]     SURGICAL HISTORY OF -       endometriosis       SOCIAL HISTORY    Social History     Tobacco Use     Smoking status: Never Smoker     Smokeless tobacco: Never Used   Substance Use Topics     Alcohol use: No     Drug use: No       FAMILY HISTORY    Family History   Problem Relation Age of Onset     Cancer Sister      Cancer Maternal Grandmother         lymphoma     Heart Disease Father         MI     Uterine Cancer Niece        REVIEW OF SYSTEMS  A 10 point review of systems including Constitutional, Eyes, Respiratory, Cardiovascular, Gastroenterology, Genitourinary, Integumentary, Musculoskeletal, and Psychiatric, were all negative, except for pertinent positives noted in the above HPI.    OBJECTIVE  BP (!) 134/93   Pulse 90    "Ht 1.575 m (5' 2\")   LMP  (LMP Unknown)   BMI 21.45 kg/m      Gen: NAD, resting comfortably  CV: well perfused, no LE edema  Resp: normal respiratory rate and effort    Pelvic: Hypopigmented perineum. Complete agglutination of labia minora with a small opening around the urethra. Urethral opening has some dried mucoid discharge. Narrowed introitus, atrophic hyperemic in appearance    ASSESSMENT  Elizabeth Taylor is a 80 year old who is here for vulvar discoloration. History significant for anal cancer s/p radiation and stable left adnexal cyst, following with Dr. Goldman.    PLAN  Problem   Atrophic Vaginitis    - Discussed appearance of patient's external genitalia to increase her understanding of her anatomy  - Labial fusion, hypopigmentation, and atrophy consistent with hypoestrogenism and h/o radiation therapy  - Per patient, urethra is patent and she has no difficulty voiding   - Explained that white discharge around urethra is likely physiologic  - Continue using dilators  - Recommended Vaseline as skin barrier protection  - Follow-up yearly for pelvic exams   - Given h/o radiation therapy for anal cancer, monitor for skin cancer       Left adnexal mass stable on imaging. Will continue monitoring annually - Elizabeth will have screening MRI for anorectal cancer recurrence.     RTC in 1 year, or sooner for any concerns.    Kiesha Dickens MD  Obstetrics and Gynecology, PGY-1  10/07/20, 3:46 PM     OBGYN Attending Addendum    Elizabeth Taylor was seen by the resident, Dr. Dickens. I, Maggie Mcgowan, supervised all aspects of this visit, including the history and physical exam. The assessment and plan were made jointly between myself and Dr. Dickens. I have edited the note where necessary to reflect my assessment and agree with the above documentation.     Maggie Mcgowan MD, MSCI    [facility]/OB[facility]N  "

## 2020-10-07 ENCOUNTER — OFFICE VISIT (OUTPATIENT)
Dept: OBGYN | Facility: CLINIC | Age: 80
End: 2020-10-07
Attending: OBSTETRICS & GYNECOLOGY
Payer: MEDICARE

## 2020-10-07 VITALS
SYSTOLIC BLOOD PRESSURE: 134 MMHG | HEART RATE: 90 BPM | DIASTOLIC BLOOD PRESSURE: 93 MMHG | HEIGHT: 62 IN | BODY MASS INDEX: 21.45 KG/M2

## 2020-10-07 DIAGNOSIS — N95.2 ATROPHIC VAGINITIS: ICD-10-CM

## 2020-10-07 PROCEDURE — 99213 OFFICE O/P EST LOW 20 MIN: CPT | Mod: GC | Performed by: OBSTETRICS & GYNECOLOGY

## 2020-10-07 NOTE — LETTER
"10/7/2020       RE: Elizabeth Taylor  [address]  [place] [address]     Dear Colleague,    Thank you for referring your patient, Elizabeth Taylor, to the [facility]. Please see a copy of my visit note below.    [facility]  Gynecology Consult Visit    SUBJECTIVE    Elizabeth Taylor is a 80 year old who is here for vulvar discoloration. She has a history of anal cancer s/p radiation. She has also seen Dr. Goldman with Gyn/Onc for a left adnexal cyst. This has been stable in size; Dr. Goldman recommended annual imaging evaluation as she felt the cancerous potential was very low. She referred Elizabeth to a benign gynecologist for management of clitoral randall atrophy, and for yearly surveillance pelvic and vulvar exams.    Elizabeth states that her vulva has been healing well after the radiation, and she no longer has pain around her vulva or anus. However, she is uncertain what normal female external genitalia should look like for a woman her age after radiation, and she would like to know if the changes she is noticing are normal. One month ago, she noticed a bump or small bruise above her clitoris, that she says \"looks like another clitoris.\" She denies pain or discomfort, but just wants to know what it is. She has been putting bacitracin on it to keep it from getting dry. She also has something white in the area of her clitoris that she thinks might be a piece of cotton from her underpants that is stuck, and she cannot get it out with her fingernail.    She has been using her finger and dilators to keep her vaginal canal patent, as instructed by a physician. She thinks that is going well. She voids spontaneously without difficulty.    Her LMP is: No LMP recorded (lmp unknown). Patient is postmenopausal.    PAST MEDICAL HISTORY  Past Medical History:   Diagnosis Date     Anal cancer (H)      " Endometriosis, site unspecified      Thyroiditis, unspecified     Thryoiditis-resolved       MEDICATIONS  Current Outpatient Medications   Medication     acetaminophen (TYLENOL) 500 MG tablet     Calcium-Magnesium-Zinc 333-133-5 MG TABS per tablet     cholecalciferol (VITAMIN D3) 5000 units (125 mcg) capsule     conjugated estrogens (PREMARIN) 0.625 MG/GM vaginal cream     docusate sodium (COLACE) 100 MG capsule     estradiol (ESTRACE) 0.1 MG/GM vaginal cream     gabapentin (NEURONTIN) 100 MG capsule     metroNIDAZOLE (METROGEL) 0.75 % external gel     vitamin B complex with vitamin C (VITAMIN  B COMPLEX) TABS tablet     VITAMIN E PO     No current facility-administered medications for this visit.        ALLERGIES  Allergies   Allergen Reactions     Darvon [Propoxyphene]      Had reaction in teen years     Ketoconazole Rash     Penicillins      As a child       OBSTETRIC/GYNECOLOGIC HISTORY    OB History   No obstetric history on file.       PAST SURGICAL HISTORY   Past Surgical History:   Procedure Laterality Date     APPENDECTOMY      as a child     ARTHROPLASTY HIP Right 7/26/2018    Procedure: ARTHROPLASTY HIP;  Right Hip Hemiarthroplasty;  Surgeon: Zaire Phan MD;  Location: [facility] OR     COLONOSCOPY N/A 4/13/2017    Procedure: COLONOSCOPY;  Surgeon: Juan Dos Santos MD;  Location: [facility] GI     INSERT PORT VASCULAR ACCESS Right 2/8/2018    Procedure: INSERT PORT VASCULAR ACCESS;  Place Single Lumen Venous Chest Port Placement;  Surgeon: Zaire Moreira PA-C;  Location: [facility] OR     SURGICAL HISTORY OF -       endometriosis       SOCIAL HISTORY    Social History     Tobacco Use     Smoking status: Never Smoker     Smokeless tobacco: Never Used   Substance Use Topics     Alcohol use: No     Drug use: No       FAMILY HISTORY    Family History   Problem Relation Age of Onset     Cancer Sister      Cancer Maternal Grandmother         lymphoma     Heart Disease Father         MI     Uterine  "Cancer Niece        REVIEW OF SYSTEMS  A 10 point review of systems including Constitutional, Eyes, Respiratory, Cardiovascular, Gastroenterology, Genitourinary, Integumentary, Musculoskeletal, and Psychiatric, were all negative, except for pertinent positives noted in the above HPI.    OBJECTIVE  BP (!) 134/93   Pulse 90   Ht 1.575 m (5' 2\")   LMP  (LMP Unknown)   BMI 21.45 kg/m      Gen: NAD, resting comfortably  CV: well perfused, no LE edema  Resp: normal respiratory rate and effort    Pelvic: Hypopigmented perineum. Complete agglutination of labia minora with a small opening around the urethra. Urethral opening has some dried mucoid discharge. Narrowed introitus, atrophic hyperemic in appearance    ASSESSMENT  Elizabeth Taylor is a 80 year old who is here for vulvar discoloration. History significant for anal cancer s/p radiation and stable left adnexal cyst, following with Dr. Goldman.    PLAN  Problem   Atrophic Vaginitis    - Discussed appearance of patient's external genitalia to increase her understanding of her anatomy  - Labial fusion, hypopigmentation, and atrophy consistent with hypoestrogenism and h/o radiation therapy  - Per patient, urethra is patent and she has no difficulty voiding   - Explained that white discharge around urethra is likely physiologic  - Continue using dilators  - Recommended Vaseline as skin barrier protection  - Follow-up yearly for pelvic exams   - Given h/o radiation therapy for anal cancer, monitor for skin cancer       Left adnexal mass stable on imaging. Will continue monitoring annually - Elizabeth will have screening MRI for anorectal cancer recurrence.     RTC in 1 year, or sooner for any concerns.    Kiesha Dickens MD  Obstetrics and Gynecology, PGY-1  10/07/20, 3:46 PM     OBGYN Attending Addendum    Elizabeth Taylor was seen by the resident, Dr. Dickens. I, Maggie Mcgowan, supervised all aspects of this visit, including the history and physical exam. The assessment " and plan were made jointly between myself and Dr. Dickens. I have edited the note where necessary to reflect my assessment and agree with the above documentation.     Maggie Mcgowan MD, MSCI    [facility]/OBGYN

## 2020-10-29 ENCOUNTER — PATIENT OUTREACH (OUTPATIENT)
Dept: CARE COORDINATION | Facility: CLINIC | Age: 80
End: 2020-10-29

## 2020-10-29 ENCOUNTER — OFFICE VISIT (OUTPATIENT)
Dept: FAMILY MEDICINE | Facility: CLINIC | Age: 80
End: 2020-10-29
Payer: MEDICARE

## 2020-10-29 VITALS
WEIGHT: 116.5 LBS | HEART RATE: 87 BPM | TEMPERATURE: 98.3 F | BODY MASS INDEX: 21.31 KG/M2 | SYSTOLIC BLOOD PRESSURE: 136 MMHG | DIASTOLIC BLOOD PRESSURE: 68 MMHG | OXYGEN SATURATION: 97 %

## 2020-10-29 DIAGNOSIS — I87.2 VENOUS STASIS DERMATITIS OF BOTH LOWER EXTREMITIES: ICD-10-CM

## 2020-10-29 DIAGNOSIS — M62.838 MUSCLE SPASMS OF BOTH LOWER EXTREMITIES: Primary | ICD-10-CM

## 2020-10-29 DIAGNOSIS — M81.0 OSTEOPOROSIS, UNSPECIFIED OSTEOPOROSIS TYPE, UNSPECIFIED PATHOLOGICAL FRACTURE PRESENCE: ICD-10-CM

## 2020-10-29 PROCEDURE — 99215 OFFICE O/P EST HI 40 MIN: CPT | Performed by: PHYSICIAN ASSISTANT

## 2020-10-29 NOTE — LETTER
[facility]  [address]  [place] [address]    November 6, 2020    Elizabeth Taylor  [address]   [place] [address]      Dear Elizabeth,    I am a clinic community health worker who works with Jann Henderson PA-C at [facility]. I wanted to thank you for spending the time to talk with me.  Below is a description of clinic care coordination and how I can further assist you.      The clinic care coordination team is made up of a registered nurse,  and community health worker who understand the health care system. The goal of clinic care coordination is to help you manage your health and improve access to the health care system in the most efficient manner. The team can assist you in meeting your health care goals by providing education, coordinating services, strengthening the communication among your providers and supporting you with any resource needs.    Please feel free to contact me at [phone] with any questions or concerns. We are focused on providing you with the highest-quality healthcare experience possible and that all starts with you.     Sincerely,     [place]

## 2020-10-29 NOTE — PATIENT INSTRUCTIONS
Await call from care coordinators to help with finances   Follow up with physical therapy  Try new medication at bedtime to help with muscle spasms  Consider follow up with vascular medicine to discuss venous insufficiency / swelling to lower extremities

## 2020-10-29 NOTE — PROGRESS NOTES
Subjective     Elizabeth Taylor is a 80 year old female who presents to clinic today for the following health issues:    EDENILSON Johnson is a pleasant 80 year old female with a history of squamous cell carcinoma of anal canal, completed radiation and chemo 2 years ago, osteoporosis (declined treatment), femoral neck fracture, right hip arthroplasty and scoliosis who presents with her friend Katherine to discuss a few concerns.     Muscle spasms    Experiencing muscle spasms on top of thighs for years. 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     The spasms are located to upper thigh, around hip flexors. Typically spasms occur between 9:30-10:00 at night and last until morning. More recently they have started around 1-2 AM. She has been prescribed gabapentin which has provided some relief but still problematic. Gabapentin seems to help in the middle of the night. She is wondering if there are any alternative or adjunctive therapies she could try.     She has tried PT in the past. This seemed to help but she reports has limited resources to attend these therapy appointments.     Skin discoloration / swelling    Ongoing swelling in lower extremities with L > R     Associated with purple discoloration to skin, dry flaky skin    Has been evaluated for this in the past, never seen vascular medicine    History of varicose veins    She does wear compression stockings and tries to elevate when at home which seems to help    Back / bone concerns    History of scoliosis and osteoporosis. She is diligent about doing back stretching exercises.     2018 had left pelvic ring fracture which was thought to be multifactorial in nature most likely 2/2 patient's significant radiation history and osteoporosis, as well recent right hip hemiarthroplasty and excess stress on the left leg for approximately four months. DEXA in January 2019 showed most negative T score of -6.4.     She reports not starting medication for  osteoporosis due to cost concerns. Per chart review she did have discussions about starting Forteo due to severe osteoporosis and history of fractures but she wwas not interested in daily injections. She was to do research at home and get back to provider about her decision. Wanted to manage with vitamin d and calcium. No other follow up on this since that time.     Review of Systems   Constitutional, HEENT, cardiovascular, pulmonary, gi and gu systems are negative, except as otherwise noted.      Objective    /68   Pulse 87   Temp 98.3  F (36.8  C) (Oral)   Wt 52.8 kg (116 lb 8 oz)   LMP  (LMP Unknown)   SpO2 97%   BMI 21.31 kg/m    Body mass index is 21.31 kg/m .  Physical Exam  Vitals signs and nursing note reviewed.   HENT:      Head: Normocephalic and atraumatic.   Eyes:      Conjunctiva/sclera: Conjunctivae normal.   Pulmonary:      Effort: Pulmonary effort is normal.   Musculoskeletal:      Right lower leg: Edema present.      Left lower leg: Edema present.   Skin:     General: Skin is warm and dry.      Findings: Rash (chronic venous dermtatitis to bilateral lower extremities L > R) present.   Neurological:      Mental Status: She is alert and oriented to person, place, and time.   Psychiatric:         Mood and Affect: Mood normal.        No results found for this or any previous visit (from the past 24 hour(s)).        Assessment & Plan   Problem List Items Addressed This Visit     None      Visit Diagnoses     Muscle spasms of both lower extremities    -  Primary    Relevant Medications    tiZANidine (ZANAFLEX) 4 MG tablet    Other Relevant Orders    PHYSICAL THERAPY REFERRAL    CARE COORDINATION REFERRAL    Osteoporosis, unspecified osteoporosis type, unspecified pathological fracture presence        Relevant Medications    tiZANidine (ZANAFLEX) 4 MG tablet    Other Relevant Orders    CARE COORDINATION REFERRAL    ENDOCRINOLOGY ADULT REFERRAL    Venous stasis dermatitis of both lower  extremities        Relevant Medications    tiZANidine (ZANAFLEX) 4 MG tablet    Other Relevant Orders    VASCULAR MEDICINE REFERRAL         We will try Zanaflex for muscle spasms  Referral to physical therapy and care coordination to help offset costs (if possible)   More extensive review of Elizabeth's chart today -- I have serious concerns about her osteoporosis history   She tells me today that she wants to manage with calcium and vitamin d, had some issues with cost of Forteo  Will refer back to endocrinology -- her bone health is a major concern  Continue regular weight bearing exercise to help prevent bone loss. She is very active with stretching and dance.   Referral to vascular medicine for assessment of venous insufficiency        Patient Instructions   Await call from care coordinators to help with finances   Follow up with physical therapy  Try new medication at bedtime to help with muscle spasms  Consider follow up with vascular medicine to discuss venous insufficiency / swelling to lower extremities     Return for Physical therapy, vascular medicine evaluation.    Jann Henderson PA-C  [facility]    Time statement:   Patient comes in today in follow-up of her muscle spasms, skin concerns, and osteoporosis. I spent 45 minutes with her of which 35 minutes were spent in counseling and coordinating care. I discussed the importance of diet, vitamin d and calcium, and exercising in preserving bone health. She has been doing fairly well but needs further evaluation by endocrinology for management of osteoporosis. She was also referred to physical therapy for balance and strengthening, care coordination to help with costs, vascular medicine to assess what appears to be venous insufficiency dermatitis.

## 2020-10-29 NOTE — PROGRESS NOTES
Clinic Care Coordination Contact  [facility]/Voicemail       Clinical Data: Care Coordinator Outreach  Outreach attempted x 1.  Left message on patient's voicemail with call back information and requested return call.  Plan:Care Coordinator will try to reach patient again in 1-2 business days.              Reason for Referral: Financial Support: Medication Affordability -- unable to afford osteoporosis medication in the past -- costs about physical therapy and appointments   Additional pertinent details: Cost for medications and appointments

## 2020-10-30 NOTE — PROGRESS NOTES
Clinic Care Coordination Contact  Community Health Worker Initial Outreach    Spoke with Elizabeth, She said, was trying to find her vote. She is interested in scheduling with CCC but would like to call me back later.       Reason for Referral: Financial Support: Medication Affordability -- unable to afford osteoporosis medication in the past -- costs about physical therapy and appointments   Additional pertinent details: Cost for medications and appointments

## 2020-11-02 NOTE — PROGRESS NOTES
Clinic Care Coordination Contact  Community Health Worker Initial Outreach     Spoke with Elizabeth today, She is still interested in our services but states she has some big appointments this week and would know more of what's next. She wants to wait before scheduling and asked for a call back Thursday.      Walks 4 times a day in the hallway

## 2020-11-05 NOTE — PROGRESS NOTES
Clinic Care Coordination Contact  [facility]/Voicemail       Clinical Data: Care Coordinator Outreach  Outreach attempted x 1.  Left message on patient's voicemail with call back information and requested return call.  Plan: Care Coordinator will try to reach patient again in 1-2 business days.

## 2020-11-06 NOTE — PROGRESS NOTES
Clinic Care Coordination Contact  Community Health Worker Initial Outreach            Patient accepts CC: Undecided. Patient was anxious while on the phone today as she needed to connect with her friend who is going out of town. She said, she needs some additional time to think about the services and will give me a call back. If I don't hear back from her, I'll try reaching back out on Wed 11/11/2020. Patient agreeable to receive the introduction letter in the mail as she doesn't access her [employer].

## 2020-11-10 ENCOUNTER — TELEPHONE (OUTPATIENT)
Dept: VASCULAR SURGERY | Facility: CLINIC | Age: 80
End: 2020-11-10

## 2020-11-10 NOTE — TELEPHONE ENCOUNTER
Please call and schedule Yaquelin for an in person, new patient visit with Dr. Gabriel for lower extremity edema. She is a referral from Dr. Henderson

## 2020-11-10 NOTE — TELEPHONE ENCOUNTER
----- Message from Shasta Mackay RN sent at 11/9/2020  4:21 PM CST -----  Regarding: FW: vasc med referral [place]??  Santiago referring  Please call and schedule Yaquelin for an in person, new patient visit with Dr. Gabriel for lower extremity edema. She is a referral from Dr. Henderson.    Thanks!    Shasta  ----- Message -----  From: Katherine Morales RN  Sent: 11/9/2020  11:17 AM CST  To: Shasta Mackay RN  Subject: vasc med referral [place]??  Santiago referring       HI,     Pt taken from Corky gusman A. Kate Jacobson RN, BSN  Interventional Radiology Nurse Coordinator   Phone:  [phone]

## 2020-11-11 NOTE — PROGRESS NOTES
Clinic Care Coordination Contact  [facility]       Clinical Data: Care Coordinator Outreach  Outreach attempted x 1.  Line was busy  Plan:Care Coordinator will try to reach patient again in 3-5 business days.

## 2020-11-13 NOTE — PROGRESS NOTES
Clinic Care Coordination Contact  Community Health Worker Initial Outreach            Patient accepts CC:      Patient seems confused and overwhelmed to schedule with another person. But remembers speaking with someone in care coordination. I did ask if she is thinking of Katarzyna with Oncology CCC. She said, that maybe correct. I'll send a message to Katarzyna for additional support to see how we can support Elizabeth.  Zari been unsuccessful connecting her with our team for addressing her needs.       Reason for Referral: Financial Support: Medication Affordability -- unable to afford osteoporosis medication in the past -- costs about physical therapy and appointments   Additional pertinent details: Cost for medications and appointments

## 2020-11-19 NOTE — TELEPHONE ENCOUNTER
I appreciate all of your efforts. I can [place] back about it with her the next time we visit. Thanks for letting me know!  Jann

## 2020-11-20 NOTE — PROGRESS NOTES
Clinic Care Coordination Contact  Community Health Worker Initial Outreach     Consulted with care team. No further outreach will be made at this time. Order has been removed and provider has been updated.

## 2020-12-11 ENCOUNTER — ANCILLARY PROCEDURE (OUTPATIENT)
Dept: MRI IMAGING | Facility: CLINIC | Age: 80
End: 2020-12-11
Attending: COLON & RECTAL SURGERY
Payer: MEDICARE

## 2020-12-11 ENCOUNTER — TELEPHONE (OUTPATIENT)
Dept: SURGERY | Facility: CLINIC | Age: 80
End: 2020-12-11

## 2020-12-11 DIAGNOSIS — C21.1 MALIGNANT NEOPLASM OF ANAL CANAL (H): ICD-10-CM

## 2020-12-11 LAB
ALBUMIN SERPL-MCNC: 3.8 G/DL (ref 3.4–5)
ALP SERPL-CCNC: 75 U/L (ref 40–150)
ALT SERPL W P-5'-P-CCNC: 32 U/L (ref 0–50)
ANION GAP SERPL CALCULATED.3IONS-SCNC: 4 MMOL/L (ref 3–14)
AST SERPL W P-5'-P-CCNC: 31 U/L (ref 0–45)
BASOPHILS # BLD AUTO: 0 10E9/L (ref 0–0.2)
BASOPHILS NFR BLD AUTO: 0.4 %
BILIRUB SERPL-MCNC: 1.3 MG/DL (ref 0.2–1.3)
BUN SERPL-MCNC: 20 MG/DL (ref 7–30)
CALCIUM SERPL-MCNC: 9.1 MG/DL (ref 8.5–10.1)
CEA SERPL-MCNC: 2.7 UG/L (ref 0–2.5)
CHLORIDE SERPL-SCNC: 106 MMOL/L (ref 94–109)
CO2 SERPL-SCNC: 29 MMOL/L (ref 20–32)
CREAT SERPL-MCNC: 0.72 MG/DL (ref 0.52–1.04)
DIFFERENTIAL METHOD BLD: NORMAL
EOSINOPHIL # BLD AUTO: 0.1 10E9/L (ref 0–0.7)
EOSINOPHIL NFR BLD AUTO: 0.6 %
ERYTHROCYTE [DISTWIDTH] IN BLOOD BY AUTOMATED COUNT: 14.6 % (ref 10–15)
GFR SERPL CREATININE-BSD FRML MDRD: 78 ML/MIN/{1.73_M2}
GLUCOSE SERPL-MCNC: 88 MG/DL (ref 70–99)
HCT VFR BLD AUTO: 37.6 % (ref 35–47)
HGB BLD-MCNC: 12.3 G/DL (ref 11.7–15.7)
IMM GRANULOCYTES # BLD: 0.1 10E9/L (ref 0–0.4)
IMM GRANULOCYTES NFR BLD: 0.6 %
LYMPHOCYTES # BLD AUTO: 0.8 10E9/L (ref 0.8–5.3)
LYMPHOCYTES NFR BLD AUTO: 9 %
MCH RBC QN AUTO: 31.5 PG (ref 26.5–33)
MCHC RBC AUTO-ENTMCNC: 32.7 G/DL (ref 31.5–36.5)
MCV RBC AUTO: 96 FL (ref 78–100)
MONOCYTES # BLD AUTO: 0.5 10E9/L (ref 0–1.3)
MONOCYTES NFR BLD AUTO: 5.8 %
NEUTROPHILS # BLD AUTO: 7.6 10E9/L (ref 1.6–8.3)
NEUTROPHILS NFR BLD AUTO: 83.6 %
NRBC # BLD AUTO: 0 10*3/UL
NRBC BLD AUTO-RTO: 0 /100
PLATELET # BLD AUTO: 190 10E9/L (ref 150–450)
POTASSIUM SERPL-SCNC: 3.8 MMOL/L (ref 3.4–5.3)
PROT SERPL-MCNC: 7.3 G/DL (ref 6.8–8.8)
RBC # BLD AUTO: 3.9 10E12/L (ref 3.8–5.2)
SODIUM SERPL-SCNC: 139 MMOL/L (ref 133–144)
WBC # BLD AUTO: 9 10E9/L (ref 4–11)

## 2020-12-11 PROCEDURE — A9585 GADOBUTROL INJECTION: HCPCS | Performed by: RADIOLOGY

## 2020-12-11 PROCEDURE — 250N000011 HC RX IP 250 OP 636: Performed by: PHYSICIAN ASSISTANT

## 2020-12-11 PROCEDURE — 72197 MRI PELVIS W/O & W/DYE: CPT | Mod: GC | Performed by: RADIOLOGY

## 2020-12-11 PROCEDURE — 85025 COMPLETE CBC W/AUTO DIFF WBC: CPT | Performed by: PATHOLOGY

## 2020-12-11 PROCEDURE — 36591 DRAW BLOOD OFF VENOUS DEVICE: CPT

## 2020-12-11 PROCEDURE — 82378 CARCINOEMBRYONIC ANTIGEN: CPT | Performed by: PATHOLOGY

## 2020-12-11 PROCEDURE — 80053 COMPREHEN METABOLIC PANEL: CPT | Performed by: PATHOLOGY

## 2020-12-11 PROCEDURE — 36415 COLL VENOUS BLD VENIPUNCTURE: CPT | Performed by: PATHOLOGY

## 2020-12-11 RX ORDER — HEPARIN SODIUM (PORCINE) LOCK FLUSH IV SOLN 100 UNIT/ML 100 UNIT/ML
5 SOLUTION INTRAVENOUS DAILY PRN
Status: DISCONTINUED | OUTPATIENT
Start: 2020-12-11 | End: 2021-03-11 | Stop reason: HOSPADM

## 2020-12-11 RX ORDER — GADOBUTROL 604.72 MG/ML
7.5 INJECTION INTRAVENOUS ONCE
Status: COMPLETED | OUTPATIENT
Start: 2020-12-11 | End: 2020-12-11

## 2020-12-11 RX ADMIN — GADOBUTROL 5 ML: 604.72 INJECTION INTRAVENOUS at 10:48

## 2020-12-11 RX ADMIN — Medication 5 ML: at 11:55

## 2020-12-11 NOTE — TELEPHONE ENCOUNTER
Patient's daughter called asking for preparation instructions for patient's flexible sigmoidoscopy appointment with Dr. Rosas scheduled on Monday 12/14/2020 at 4:00 PM. I called MADELEINE Mock, she said that she sent detailed instructions via [employer] yesterday, but she also reviewed the instructions, which I repeated to patient's daughter since she was away from a computer and not able to read [employer] at this time. Patient will plan to arrive 15 mins early to prep in clinic. MADELEINE Mock said there are no food/drink restrictions.

## 2020-12-11 NOTE — NURSING NOTE
Chief Complaint   Patient presents with     Port Draw     Labs drawn via port by RN in lab.      Labs drawn via Port. Line flushed and Heparin locked.     Lani Culp RN

## 2020-12-11 NOTE — PROGRESS NOTES
"Colon and Rectal Surgery Follow-up Clinic Note    RE: Elizabeth Taylor  : 1940  [place]: 2021    DIAGNOSIS: mT2N1 anal canal squamous cell carcinoma (s/p CXRT [5,400 cGy], completed on 3/23/18).    INTERVAL HISTORY: Denies increased pain, fevers, or chills. Tolerating diet well. Having 3-4 mushy BMs per day. No BPR. Does have persistent fecal urgency. Fecal incontinence is improving, mainly to liquid stool. Wears depends for this.      Physical Examination:  BP (!) 141/81 (BP Location: Left arm, Patient Position: Sitting, Cuff Size: Adult Regular)   Pulse 75   Temp 97.4  F (36.3  C) (Oral)   Ht 5' 2\"   Wt 118 lb 14.4 oz   LMP  (LMP Unknown)   SpO2 96%   BMI 21.75 kg/m    Abdomen soft, NT. No inguinal adenopathy palpated.  Perianal skin with no excoriation. Stable hyper/hypopigmentation.  BRITTNEY: no lesions palpated.  Flexible sigmoidoscopy: after obtaining informed consent and performing a \"time out\", an adult flexible sigmoidoscope was introduced through the anus and passed up to the mid sigmoid colon. The quality of the prep was good. Findings: 2 cm left anterior flat white scar right at the dentate line with no evidence of recurrent neoplasia.  Mild post radiation proctitis.  No additional abnormalities were seen. Total scope time: 12 minutes. The patient tolerated the procedure well.    ASSESSMENT  Positive response to CXRT. No evidence of persistent or recurrent neoplasia.     2018 12:01 2020 09:24 2020 12:00   CEA 2.0 2.1 2.7 (H)       CT CAP 20:  IMPRESSION: No evidence of recurrent or residual disease in the chest,  abdomen, or pelvis.      3T MR pelvis 20  IMPRESSION: Images are partially obscured by susceptibility artifact  related to the patient's right hip arthroplasty.   1. Complete response to treatment. No evidence of local recurrence or  metastatic disease in the pelvis.  2. Slightly increased size of the larger left adnexal cyst since  2020, measuring " up to 4.5 cm without associated worrisome imaging  features. Continued attention on MRI follow-up.  3. Unchanged left pubic rami fractures.    PLAN  1.  3T MR pelvis and Flex Sig in 6/2021.  2.  PET/CT in 8/2021.  4.  Colonoscopy in 2022.  5.  Follow-up and surveillance for left ovarian cyst with Dr. Goldman.    Time spent: 30 minutes.  >50% spent in discussing, counseling and coordinating care.    Emir Rosas M.D., M.Sc.     Division of Colon and Rectal Surgery  [facility]    Referring Provider:  Felisha Davis, CHAYITO  [address] [place] [address]     CC:  MD Zaire Mcleod MD Melissa Geller, MD

## 2020-12-14 ENCOUNTER — OFFICE VISIT (OUTPATIENT)
Dept: SURGERY | Facility: CLINIC | Age: 80
End: 2020-12-14
Payer: MEDICARE

## 2020-12-14 VITALS
HEIGHT: 62 IN | DIASTOLIC BLOOD PRESSURE: 81 MMHG | SYSTOLIC BLOOD PRESSURE: 141 MMHG | OXYGEN SATURATION: 96 % | TEMPERATURE: 97.4 F | BODY MASS INDEX: 21.88 KG/M2 | WEIGHT: 118.9 LBS | HEART RATE: 75 BPM

## 2020-12-14 DIAGNOSIS — Z85.048 HISTORY OF ANAL CANCER: Primary | ICD-10-CM

## 2020-12-14 PROCEDURE — 45330 DIAGNOSTIC SIGMOIDOSCOPY: CPT | Performed by: COLON & RECTAL SURGERY

## 2020-12-14 ASSESSMENT — PAIN SCALES - GENERAL: PAINLEVEL: NO PAIN (0)

## 2020-12-14 ASSESSMENT — MIFFLIN-ST. JEOR: SCORE: 957.58

## 2020-12-14 NOTE — LETTER
"2020       RE: Elizabeth Taylor  [address]  [place] [address]     Dear Colleague,    Thank you for referring your patient, Elizabeth Taylor, to the [facility] COLON AND RECTAL SURGERY CLINIC [place] at [facility]. Please see a copy of my visit note below.    Colon and Rectal Surgery Follow-up Clinic Note    RE: Elizabeth Taylor  : 1940  [place]: 2020    DIAGNOSIS: mT2N1 anal canal squamous cell carcinoma (s/p CXRT [5,400 cGy], completed on 3/23/18).    INTERVAL HISTORY: Denies increased pain, fevers, or chills. Tolerating diet well. Having 3-4 mushy BMs per day. No BPR. Does have persistent fecal urgency. Fecal incontinence is improving, mainly to liquid stool. Wears depends for this.      Physical Examination:  BP (!) 141/81 (BP Location: Left arm, Patient Position: Sitting, Cuff Size: Adult Regular)   Pulse 75   Temp 97.4  F (36.3  C) (Oral)   Ht 5' 2\"   Wt 118 lb 14.4 oz   LMP  (LMP Unknown)   SpO2 96%   BMI 21.75 kg/m    Abdomen soft, NT. No inguinal adenopathy palpated.  Perianal skin with no excoriation. Stable hyper/hypopigmentation.  BRITTNEY: no lesions palpated.  Flexible sigmoidoscopy: after obtaining informed consent and performing a \"time out\", an adult flexible sigmoidoscope was introduced through the anus and passed up to the mid sigmoid colon. The quality of the prep was good. Findings: 2 cm left anterior flat white scar right at the dentate line with no evidence of recurrent neoplasia.  Mild post radiation proctitis.  No additional abnormalities were seen. Total scope time: 12 minutes. The patient tolerated the procedure well.    ASSESSMENT  Positive response to CXRT. No evidence of persistent or recurrent neoplasia.     2018 12:01 2020 09:24 2020 12:00   CEA 2.0 2.1 2.7 (H)       CT CAP 20:  IMPRESSION: No evidence of recurrent or residual disease in the chest,  abdomen, or pelvis. "      3T MR pelvis 12/11/20  IMPRESSION: Images are partially obscured by susceptibility artifact  related to the patient's right hip arthroplasty.   1. Complete response to treatment. No evidence of local recurrence or  metastatic disease in the pelvis.  2. Slightly increased size of the larger left adnexal cyst since  6/8/2020, measuring up to 4.5 cm without associated worrisome imaging  features. Continued attention on MRI follow-up.  3. Unchanged left pubic rami fractures.    PLAN  1.  3T MR pelvis and Flex Sig in 6/2021.  2.  PET/CT in 8/2021.  4.  Colonoscopy in 2022.  5.  Follow-up and surveillance for left ovarian cyst with Dr. Goldman.    Time spent: 30 minutes.  >50% spent in discussing, counseling and coordinating care.    Emir Rosas M.D., M.Sc.     Division of Colon and Rectal Surgery  [facility]    Referring Provider:  Felisha Davis, CHAYITO  [address] [place] [address]     CC:  MD Zaire Mcleod MD Melissa Geller, MD

## 2020-12-14 NOTE — PATIENT INSTRUCTIONS
Follow up:    1. MRI and flex sig in June 2021    Please call with any questions or concerns regarding your clinic visit today.    It is a pleasure being involved in your health care.    Contacts post-consultation depending on your need:    Radiology Appointments [phone]    Schedule Clinic Appointments [phone] # 1   M-F 7:30 - 5 pm    MADELEINE Mock [phone]    Clinic Fax Number [phone]    Surgery Scheduling [phone]    My Chart is available 24 hours a day and is a secure way to access your records and communicate with your care team.  I strongly recommend signing up if you haven't already done so, if you are comfortable with computers.  If you would like to inquire about this or are having problems with My Chart access, you may call [phone] or go online at [email or web address].  Please allow at least 24 hours for a response and extra time on weekends and Holidays.       LOWER ABDOMEN

## 2020-12-14 NOTE — NURSING NOTE
"Chief Complaint   Patient presents with     Flexible Sigmoidoscopy       Vitals:    12/14/20 1335   BP: (!) 141/81   BP Location: Left arm   Patient Position: Sitting   Cuff Size: Adult Regular   Pulse: 75   Temp: 97.4  F (36.3  C)   TempSrc: Oral   SpO2: 96%   Weight: 118 lb 14.4 oz   Height: 5' 2\"       Body mass index is 21.75 kg/m .        Sherrie Suggs CMA    " Luis Wiseman

## 2020-12-25 NOTE — PROGRESS NOTES
"Elizabeth Taylor is a 80 year old female who is being evaluated via a billable telephone visit.      The patient has been notified of following:     \"This telephone visit will be conducted via a call between you and your physician/provider. We have found that certain health care needs can be provided without the need for a physical exam.  This service lets us provide the care you need with a short phone conversation.  If a prescription is necessary we can send it directly to your pharmacy.  If lab work is needed we can place an order for that and you can then stop by our lab to have the test done at a later time.    Telephone visits are billed at different rates depending on your insurance coverage. During this emergency period, for some insurers they may be billed the same as an in-person visit.  Please reach out to your insurance provider with any questions.    If during the course of the call the physician/provider feels a telephone visit is not appropriate, you will not be charged for this service.\"    Patient has given verbal consent for Telephone visit?  Yes    What phone number would you like to be contacted at? [phone]    How would you like to obtain your AVS? Mail a copy    Phone call duration: 30 minutes        Vitals - Patient Reported  Weight (Patient Reported): 53.5 kg (118 lb)  Height (Patient Reported): 157.5 cm (5' 2\")  BMI (Based on Pt Reported Ht/Wt): 21.58  Pain Score: No Pain (0)    Opal Mercado MA        Oncology/Hematology Visit Note  Dec 28, 2020    Reason for Visit: Follow up of anal cancer    History of Present Illness: Elizabeth Taylor is a 80 year old female with a history of SCC of the anal canal, stage T2N1 IIIA. Her oncologic history is as follows:     Developed bright red blood per rectum started about 4 years ago and progressively got worse. She underwent a colonoscopy in April 2017 which noted to have small anal fissure along with internal hemorrhoids. Symptoms " continued and  she was referred to colorectal surgery for further evaluation. On exam she was found to have an 1.5 cm anal lesion on the left side along with left groin palpable adenopathy. Biopsy of anal mass came back positive for squamous cell carcinoma. Patient underwent staging workup with MRI pelvis and a PET scan- showed PET avid left anal canal mass along with small left inguinal FDG avid lymph nodes.      02/12/18- Started on concurrent chemoradiation with 5FU/Mitomycin     03/13/18 Skipped Mitomycin and proceeded with 5FU at reduced dose given neutropenia/old age     03/19/18 -04/04/18 admitted to the [facility] with intractable diarrhea, nausea, generalized weakness and fall at home resulting in multiple right rib fractures. Hospital course was complicated by small bowel obstruction for which patient was started on TPN. Bowel obstruction was managed conservatively eventually improved prior to discharge . 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 She was eventually discharged to transitional care unit.     05/25/18 05/25/18 MRI pelvis and PET scan done showed near complete resolution of patient's known anal lesion with no evidence for recurrence or metastatic disease.     07/25/18-07/30/18 Admitted to the hospital with traumatic right femoral neck fracture secondary to fall. She underwent right hemiarthroplasty and was discharged to transitional care facility. Also noted to have a C. difficile infection and was started on oral vancomycin course.     11/1/18-11/5/18 admitted to hospital with a fall and left hip pain found to have pelvic ring fracture. Treated conservatively with pain control, PT/OT, and recommendations for PCP follow-up with DEXA scan and osteoporosis work-up.      She has had no signs of disease recurrence since that completion of treatment. Most recent imaging (MR Dec 2020 and PET June 2020) with no evidence of recurrent  disease, did have slight increase in adnexal cyst.     She was called today for oncology follow-up.     Interval History:  Elizabeth was called today for oncology follow-up and we discussed the following:  -She is having more normal bowel movements now and has better control. No abdominal pain or blood in stools  -She has muscle spasms overnight that sometimes make it hard to sleep but is sleeping better now with tizanidine, but hasn't needed to keep taking it  -Eating and drinking well, weight stable.   -She denies fevers, respiratory concerns, nausea/vomiting    Current Outpatient Medications   Medication Sig Dispense Refill     acetaminophen (TYLENOL) 500 MG tablet Take 1,000 mg by mouth 3 times daily       Calcium-Magnesium-Zinc 333-133-5 MG TABS per tablet Take 1 tablet by mouth daily       cholecalciferol (VITAMIN D3) 5000 units (125 mcg) capsule 1 CAP BY MOUTH DAILY (DX: OSTEOPOROSIS) 90 capsule 3     conjugated estrogens (PREMARIN) 0.625 MG/GM vaginal cream Apply locally to clitoral randall pea sized amount daily for 2 weeks then prn. 4 g 0     docusate sodium (COLACE) 100 MG capsule Take 1 capsule (100 mg) by mouth 2 times daily 60 capsule      estradiol (ESTRACE) 0.1 MG/GM vaginal cream Apply locally to clitoral randall pea sized amount daily for 2 weeks then prn. 42.5 g 0     gabapentin (NEURONTIN) 100 MG capsule TAKE ONE TO FOUR CAPSULES AT BEDTIME AS NEEDED FOR MUSCLE SPASMS 240 capsule 1     metroNIDAZOLE (METROGEL) 0.75 % external gel Apply topically 2 times daily 45 g 3     tiZANidine (ZANAFLEX) 4 MG tablet Take 1 tablet (4 mg) by mouth nightly as needed for muscle spasms 30 tablet 1     vitamin B complex with vitamin C (VITAMIN  B COMPLEX) TABS tablet Take 1 tablet by mouth daily       VITAMIN E PO Take 1 capsule by mouth daily       Physical Examination:  No vitals for today's visit  Wt Readings from Last 10 Encounters:   12/14/20 53.9 kg (118 lb 14.4 oz)   10/29/20 52.8 kg (116 lb 8 oz)   06/15/20 53.2 kg  (117 lb 4.8 oz)   04/09/20 49.9 kg (110 lb)   03/11/20 52.9 kg (116 lb 9.6 oz)   03/02/20 53.5 kg (118 lb)   02/27/20 53.6 kg (118 lb 1.6 oz)   02/24/20 53.1 kg (117 lb)   01/27/20 54 kg (119 lb)   08/23/19 53.1 kg (117 lb)     Well sounding female in no acute distress. No audible wheezing or cough. Speech and thought process repetitive at times, but easily redirectable. Good voice quality.      Laboratory Data:    MRI pelvis 12/11/20: reviewed by me    IMPRESSION: Images are partially obscured by susceptibility artifact  related to the patient's right hip arthroplasty.   1. Complete response to treatment. No evidence of local recurrence or  metastatic disease in the pelvis.  2. Slightly increased size of the larger left adnexal cyst since  6/8/2020, measuring up to 4.5 cm without associated worrisome imaging  features. Continued attention on MRI follow-up.  3. Unchanged left pubic rami fractures.    Flex sig 12/14/20 by Dr Rosas- no evidence of recurrent neoplasia    Assessment and Plan:  1. Onc  Anal cancer s/p chemoradiation completed 2018 with no signs of disease recurrence. Will plan a visit this summer with imaging at which point she will be > 3 years out. If no evidence of disease she should n't need fuither imaging unless she has symptoms or findings on physical exam.    Seen by Dr Rosas recently who plans to follow with  1.  3T MR pelvis and Flex Sig in 6/2021.  2.  PET/CT in 8/2021.  4.  Colonoscopy in 2022.      2. Gyn  Adnexal mass stable, has seen gyn onc who felt this is likely benign and doesn't require follow up beyond the imaging she will get for her anal cancer.

## 2020-12-28 ENCOUNTER — VIRTUAL VISIT (OUTPATIENT)
Dept: ONCOLOGY | Facility: CLINIC | Age: 80
End: 2020-12-28
Attending: INTERNAL MEDICINE
Payer: MEDICARE

## 2020-12-28 DIAGNOSIS — C21.1 MALIGNANT NEOPLASM OF ANAL CANAL (H): Primary | ICD-10-CM

## 2020-12-28 PROCEDURE — 99443 PR PHYSICIAN TELEPHONE EVALUATION 21-30 MIN: CPT | Mod: 95 | Performed by: INTERNAL MEDICINE

## 2020-12-28 PROCEDURE — 999N001193 HC VIDEO/TELEPHONE VISIT; NO CHARGE

## 2020-12-28 NOTE — LETTER
"    12/28/2020         RE: Elizabeth Taylor  [address]  [place] [address]        Dear Colleague,    Thank you for referring your patient, Elizabeth Taylor, to the [facility]. Please see a copy of my visit note below.    Elizabeth Taylor is a 80 year old female who is being evaluated via a billable telephone visit.      The patient has been notified of following:     \"This telephone visit will be conducted via a call between you and your physician/provider. We have found that certain health care needs can be provided without the need for a physical exam.  This service lets us provide the care you need with a short phone conversation.  If a prescription is necessary we can send it directly to your pharmacy.  If lab work is needed we can place an order for that and you can then stop by our lab to have the test done at a later time.    Telephone visits are billed at different rates depending on your insurance coverage. During this emergency period, for some insurers they may be billed the same as an in-person visit.  Please reach out to your insurance provider with any questions.    If during the course of the call the physician/provider feels a telephone visit is not appropriate, you will not be charged for this service.\"    Patient has given verbal consent for Telephone visit?  Yes    What phone number would you like to be contacted at? [phone]    How would you like to obtain your AVS? Mail a copy    Phone call duration: 30 minutes        Vitals - Patient Reported  Weight (Patient Reported): 53.5 kg (118 lb)  Height (Patient Reported): 157.5 cm (5' 2\")  BMI (Based on Pt Reported Ht/Wt): 21.58  Pain Score: No Pain (0)    Opal Mercado MA        Oncology/Hematology Visit Note  Dec 28, 2020    Reason for Visit: Follow up of anal cancer    History of Present Illness: Elizabeth Taylor is a 80 year old female with a history of SCC of the anal canal, stage " T2N1 IIIA. Her oncologic history is as follows:     Developed bright red blood per rectum started about 4 years ago and progressively got worse. She underwent a colonoscopy in April 2017 which noted to have small anal fissure along with internal hemorrhoids. Symptoms continued and  she was referred to colorectal surgery for further evaluation. On exam she was found to have an 1.5 cm anal lesion on the left side along with left groin palpable adenopathy. Biopsy of anal mass came back positive for squamous cell carcinoma. Patient underwent staging workup with MRI pelvis and a PET scan- showed PET avid left anal canal mass along with small left inguinal FDG avid lymph nodes.      02/12/18- Started on concurrent chemoradiation with 5FU/Mitomycin     03/13/18 Skipped Mitomycin and proceeded with 5FU at reduced dose given neutropenia/old age     03/19/18 -04/04/18 admitted to the [facility] with intractable diarrhea, nausea, generalized weakness and fall at home resulting in multiple right rib fractures. Hospital course was complicated by small bowel obstruction for which patient was started on TPN. Bowel obstruction was managed conservatively eventually improved prior to discharge . 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 She was eventually discharged to transitional care unit.     05/25/18 05/25/18 MRI pelvis and PET scan done showed near complete resolution of patient's known anal lesion with no evidence for recurrence or metastatic disease.     07/25/18-07/30/18 Admitted to the hospital with traumatic right femoral neck fracture secondary to fall. She underwent right hemiarthroplasty and was discharged to transitional care facility. Also noted to have a C. difficile infection and was started on oral vancomycin course.     11/1/18-11/5/18 admitted to hospital with a fall and left hip pain found to have pelvic ring fracture. Treated conservatively with pain  control, PT/OT, and recommendations for PCP follow-up with DEXA scan and osteoporosis work-up.      She has had no signs of disease recurrence since that completion of treatment. Most recent imaging (MR Dec 2020 and PET June 2020) with no evidence of recurrent disease, did have slight increase in adnexal cyst.     She was called today for oncology follow-up.     Interval History:  Elizabeth was called today for oncology follow-up and we discussed the following:  -She is having more normal bowel movements now and has better control. No abdominal pain or blood in stools  -She has muscle spasms overnight that sometimes make it hard to sleep but is sleeping better now with tizanidine, but hasn't needed to keep taking it  -Eating and drinking well, weight stable.   -She denies fevers, respiratory concerns, nausea/vomiting    Current Outpatient Medications   Medication Sig Dispense Refill     acetaminophen (TYLENOL) 500 MG tablet Take 1,000 mg by mouth 3 times daily       Calcium-Magnesium-Zinc 333-133-5 MG TABS per tablet Take 1 tablet by mouth daily       cholecalciferol (VITAMIN D3) 5000 units (125 mcg) capsule 1 CAP BY MOUTH DAILY (DX: OSTEOPOROSIS) 90 capsule 3     conjugated estrogens (PREMARIN) 0.625 MG/GM vaginal cream Apply locally to clitoral randall pea sized amount daily for 2 weeks then prn. 4 g 0     docusate sodium (COLACE) 100 MG capsule Take 1 capsule (100 mg) by mouth 2 times daily 60 capsule      estradiol (ESTRACE) 0.1 MG/GM vaginal cream Apply locally to clitoral randall pea sized amount daily for 2 weeks then prn. 42.5 g 0     gabapentin (NEURONTIN) 100 MG capsule TAKE ONE TO FOUR CAPSULES AT BEDTIME AS NEEDED FOR MUSCLE SPASMS 240 capsule 1     metroNIDAZOLE (METROGEL) 0.75 % external gel Apply topically 2 times daily 45 g 3     tiZANidine (ZANAFLEX) 4 MG tablet Take 1 tablet (4 mg) by mouth nightly as needed for muscle spasms 30 tablet 1     vitamin B complex with vitamin C (VITAMIN  B COMPLEX) TABS tablet  Take 1 tablet by mouth daily       VITAMIN E PO Take 1 capsule by mouth daily       Physical Examination:  No vitals for today's visit  Wt Readings from Last 10 Encounters:   12/14/20 53.9 kg (118 lb 14.4 oz)   10/29/20 52.8 kg (116 lb 8 oz)   06/15/20 53.2 kg (117 lb 4.8 oz)   04/09/20 49.9 kg (110 lb)   03/11/20 52.9 kg (116 lb 9.6 oz)   03/02/20 53.5 kg (118 lb)   02/27/20 53.6 kg (118 lb 1.6 oz)   02/24/20 53.1 kg (117 lb)   01/27/20 54 kg (119 lb)   08/23/19 53.1 kg (117 lb)     Well sounding female in no acute distress. No audible wheezing or cough. Speech and thought process repetitive at times, but easily redirectable. Good voice quality.      Laboratory Data:    MRI pelvis 12/11/20: reviewed by me    IMPRESSION: Images are partially obscured by susceptibility artifact  related to the patient's right hip arthroplasty.   1. Complete response to treatment. No evidence of local recurrence or  metastatic disease in the pelvis.  2. Slightly increased size of the larger left adnexal cyst since  6/8/2020, measuring up to 4.5 cm without associated worrisome imaging  features. Continued attention on MRI follow-up.  3. Unchanged left pubic rami fractures.    Flex sig 12/14/20 by Dr Rosas- no evidence of recurrent neoplasia    Assessment and Plan:  1. Onc  Anal cancer s/p chemoradiation completed 2018 with no signs of disease recurrence. Will plan a visit this summer with imaging at which point she will be > 3 years out. If no evidence of disease she should n't need fuither imaging unless she has symptoms or findings on physical exam.    Seen by Dr Rosas recently who plans to follow with  1.  3T MR pelvis and Flex Sig in 6/2021.  2.  PET/CT in 8/2021.  4.  Colonoscopy in 2022.      2. Gyn  Adnexal mass stable, has seen gyn onc who felt this is likely benign and doesn't require follow up beyond the imaging she will get for her anal cancer.          Again, thank you for allowing me to participate in the care of  your patient.        Sincerely,        David Jones MD

## 2021-02-02 ENCOUNTER — TELEPHONE (OUTPATIENT)
Dept: OTHER | Facility: CLINIC | Age: 81
End: 2021-02-02

## 2021-02-02 NOTE — TELEPHONE ENCOUNTER
Unable to contact pt by phone. I sent the pt a [facility] message to call and schedule an in person visit in vascular with Dr. Gabriel.  Maurisio Thurston on 2/2/2021 at 10:40 AM

## 2021-02-02 NOTE — TELEPHONE ENCOUNTER
----- Message from Shasta Mackay RN sent at 1/29/2021  9:19 AM CST -----  Regarding: RE: Referral Questions  Yes, go ahead and schedule with Nery. No imaging needed prior. Thanks!   ----- Message -----  From: Maurisio Thurston  Sent: 1/29/2021   8:55 AM CST  To: Shasta Mackay RN  Subject: Referral Questions                               Shasta is it ok to schedule this pt with Dr. Gabriel if so will imaging be needed?  Maurisio Thurston on 1/29/2021 at 8:56 AM

## 2021-02-03 ENCOUNTER — VIRTUAL VISIT (OUTPATIENT)
Dept: FAMILY MEDICINE | Facility: CLINIC | Age: 81
End: 2021-02-03
Payer: MEDICARE

## 2021-02-03 DIAGNOSIS — M81.0 OSTEOPOROSIS, UNSPECIFIED OSTEOPOROSIS TYPE, UNSPECIFIED PATHOLOGICAL FRACTURE PRESENCE: ICD-10-CM

## 2021-02-03 DIAGNOSIS — M41.9 SCOLIOSIS, UNSPECIFIED SCOLIOSIS TYPE, UNSPECIFIED SPINAL REGION: Primary | ICD-10-CM

## 2021-02-03 DIAGNOSIS — M62.838 MUSCLE SPASM: ICD-10-CM

## 2021-02-03 PROCEDURE — 99443 PR PHYSICIAN TELEPHONE EVALUATION 21-30 MIN: CPT | Mod: 95 | Performed by: PHYSICIAN ASSISTANT

## 2021-02-03 NOTE — PROGRESS NOTES
.Elizabeth is a 80 year old who is being evaluated via a billable telephone visit.      What phone number would you like to be contacted at? [phone]  How would you like to obtain your AVS? MyChart  Assessment & Plan     Scoliosis, unspecified scoliosis type, unspecified spinal region  History of scoliosis; she would like to visit with ortho spine to discuss further management of this condition moving forward. Will await call from scheduling team.   Referral to care coordination to help with financial concerns.   - Orthopedic & Spine  Referral  - CARE COORDINATION REFERRAL - referral placed to help patient with medical expenses.     Muscle spasm  Improving. Diligent about stretching and staying active.   - Orthopedic & Spine  Referral  - CARE COORDINATION REFERRAL  - tiZANidine (ZANAFLEX) 4 MG tablet; Take 1 tablet (4 mg) by mouth 3 times daily as needed for muscle spasms    Osteoporosis, unspecified osteoporosis type, unspecified pathological fracture presence  - Has declined treatment in the past.   - Orthopedic & Spine  Referral    45 minutes spent on the date of the encounter doing chart review, history and exam, documentation and further activities as noted above       No follow-ups on file.    Jann Henderson PA-C  [facility]    Subjective     Elizabeth is a 80 year old who presents to clinic today for the following health issues     HPI     Medication follow up: Tizanidine   Taking as prescribed: yes  Side effects: none  Helping symptoms: yes     Last OV started tizanidine for muscle spasms. Has helped symptoms. Would like refill today.    Other: concerns about the curvature in her spine. Sleeps on firm surface which seems to help. Wondering if there is anything else she can do to help correct curvature. She does have history of osteoporosis and has declined treatment.      Review of Systems   Constitutional, HEENT, cardiovascular, pulmonary, gi and gu systems  are negative, except as otherwise noted.      Objective           Vitals:  No vitals were obtained today due to virtual visit.    Physical Exam   healthy, alert and no distress  PSYCH: Alert and oriented times 3; coherent speech, normal   rate and volume, able to articulate logical thoughts, able   to abstract reason, no tangential thoughts, no hallucinations   or delusions  Her affect is normal  RESP: No cough, no audible wheezing, able to talk in full sentences  Remainder of exam unable to be completed due to telephone visits    Phone call duration: 38 minutes

## 2021-02-04 ENCOUNTER — PATIENT OUTREACH (OUTPATIENT)
Dept: CARE COORDINATION | Facility: CLINIC | Age: 81
End: 2021-02-04

## 2021-02-04 NOTE — LETTER
February 5, 2021    Dear Elizabeth,                                                                         You were recently referred to the [facility]'s Clinic Care Coordination service.  This is a service offered through your Primary Care Clinic which can help you access resources and services in regard to your health and well-being goals. The clinic Community Health Worker has placed two calls to you to discuss the nature of this service and to offer enrollment.  If you are interested in learning more about Clinic Care Coordination, please call your Primary Care Clinic's Community Health Worker, [place], at [phone].                                                    Sincerely,                                                                              RACHEL Varner                                                                                          Clinic Care Coordination                                                  [facility]

## 2021-02-04 NOTE — PROGRESS NOTES
Clinic Care Coordination Contact  [facility]/Voicemail       Clinical Data: Care Coordinator Outreach  Outreach attempted x 1.  Left message on patient's voicemail with call back information and requested return call.  Plan: Care Coordinator will try to reach patient again in 1-2 business days.      Reason for Referral: Financial Support: Food, Housing, Medication Affordability and medical bills and Resources for Transportation  Additional pertinent details: She is open to talking with your team. Thanks for your help!  Clinical Staff have discussed the Care Coordination Referral with the patient and/or caregiver: yes

## 2021-02-05 NOTE — PROGRESS NOTES
Clinic Care Coordination Contact  [facility]/Voicemail       Clinical Data: Care Coordinator Outreach  Outreach attempted x 2.  Left message on patient's voicemail with call back information and requested return call.  Plan: Care Coordinator will send unable to contact letter with care coordinator contact information via [employer]. Care Coordinator will do no further outreaches at this time.

## 2021-02-08 NOTE — PROGRESS NOTES
"Clinic Care Coordination Contact  Community Health Worker Initial Outreach    CHW Initial Information Gathering:  Referral Source: PCP  Current living arrangement:: I live alone  Informal Support system:: Friends  No PCP office visit in Past Year: No  Transportation means:: Friend  CHW Additional Questions  If ED/Hospital discharge, follow-up appointment scheduled as recommended?: N/A  Medication changes made following ED/Hospital discharge?: N/A  MyChart active?: Yes    Patient accepts CC: Yes. Patient scheduled for assessment with CC THOMAS Campos on Friday, January 12th at 10:30 am. Patient noted desire to discuss financial resources, yoga/PT, back brace.     Pt returned my call. She thanked me for reaching out and apologized for not getting back to me last week. Pt said she has \"awful muscle spasms\" that \"make me feel crazy\" and make concentrating difficult for her.     Explained my role and gave pt examples of how we can assist. She is interested in financial supports and resources. Pt said \"cancer took my finances.\" Pt used savings to pay for medical bills and said \"I feel like I\"m skating on thin ice\" when it comes to money. Pt has Medicare and receives Social Security benefits.     Pt is also interested in \"exercises or therapy to help\" with her back. She is concerned about how she'd pay for it but would like to learn more about yoga classes or PT exercises that she could do at home.    Pt said she had a fall in the past where she broke her hip and had hip surgery. It resulted in one of her legs being 2'' shorter than the other. Pt said this \"affects how I walk and how my spine is oriented.\" Pt said she has an elastic back brace at home but she'd like one that is more supportive and comfortable.     Pt has a person (Heather) who helps pt with rides, grocery & medication  and drop off and spends some time with pt. Prior to the pandemic, pt said they would eat dinner together during the week. Pt said " "she is \"completely alone\" otherwise and \"I don't see anyone.\"     Pt took down my contact information and wrote down the appointment information while we were on the phone. Encouraged her to reach out to me with questions or if she needs to reschedule for any reason. Also clarified with pt that CCC is a free service.     "

## 2021-02-09 NOTE — TELEPHONE ENCOUNTER
Thanks for passing along that nice message :)  Piero Johnson is getting some help from your team.  All the best,  Jann

## 2021-02-12 ENCOUNTER — PATIENT OUTREACH (OUTPATIENT)
Dept: CARE COORDINATION | Facility: CLINIC | Age: 81
End: 2021-02-12

## 2021-02-12 SDOH — ECONOMIC STABILITY: TRANSPORTATION INSECURITY
IN THE PAST 12 MONTHS, HAS THE LACK OF TRANSPORTATION KEPT YOU FROM MEDICAL APPOINTMENTS OR FROM GETTING MEDICATIONS?: NO

## 2021-02-12 SDOH — ECONOMIC STABILITY: FOOD INSECURITY: WITHIN THE PAST 12 MONTHS, YOU WORRIED THAT YOUR FOOD WOULD RUN OUT BEFORE YOU GOT MONEY TO BUY MORE.: NEVER TRUE

## 2021-02-12 SDOH — ECONOMIC STABILITY: INCOME INSECURITY: HOW HARD IS IT FOR YOU TO PAY FOR THE VERY BASICS LIKE FOOD, HOUSING, MEDICAL CARE, AND HEATING?: SOMEWHAT HARD

## 2021-02-12 SDOH — HEALTH STABILITY: PHYSICAL HEALTH: ON AVERAGE, HOW MANY MINUTES DO YOU ENGAGE IN EXERCISE AT THIS LEVEL?: 10 MIN

## 2021-02-12 SDOH — ECONOMIC STABILITY: TRANSPORTATION INSECURITY
IN THE PAST 12 MONTHS, HAS LACK OF TRANSPORTATION KEPT YOU FROM MEETINGS, WORK, OR FROM GETTING THINGS NEEDED FOR DAILY LIVING?: NO

## 2021-02-12 SDOH — ECONOMIC STABILITY: FOOD INSECURITY: WITHIN THE PAST 12 MONTHS, THE FOOD YOU BOUGHT JUST DIDN'T LAST AND YOU DIDN'T HAVE MONEY TO GET MORE.: NEVER TRUE

## 2021-02-12 SDOH — HEALTH STABILITY: PHYSICAL HEALTH: ON AVERAGE, HOW MANY DAYS PER WEEK DO YOU ENGAGE IN MODERATE TO STRENUOUS EXERCISE (LIKE A BRISK WALK)?: 5 DAYS

## 2021-02-12 SDOH — HEALTH STABILITY: MENTAL HEALTH
STRESS IS WHEN SOMEONE FEELS TENSE, NERVOUS, ANXIOUS, OR CAN'T SLEEP AT NIGHT BECAUSE THEIR MIND IS TROUBLED. HOW STRESSED ARE YOU?: TO SOME EXTENT

## 2021-02-12 NOTE — PROGRESS NOTES
"Clinic Care Coordination Contact    Clinic Care Coordination Contact  OUTREACH    Referral Information:  Referral Source: (PCP)    Assessment:  called and completed initial assessment with patient today who shares that she was currently sorting her books in chronological order which is a goal of hers.    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 Patient spent quite a bit of time with her before Covid.   Patient is also close with her two sisters. Her sister Belen delivers food on Friday's.    Patient shares that she spent much of her saving on bills from having cancer and shares concern about a recent appt where they found something suspicious are are worried that the cancer will come back. Patient shares she just received a bill for $900 something for this recent visit. SW asked if she would be interested in FRW reaching out to discuss ophelia care or other financial options she may be eligible for. Patient thinks this would be great.  Patient reports that she thinks her total monthly  income is around $900, but does have several savings accounts.   SW asks about interest in [employer] meal delivery and patient shares that she is fully covered with food between Jossie and her sister Belen.    Patient shares that she would like a back brace from her waist-line that goes up to her neck for spine support. Patient shares she  her hip and one leg is 2\" shorter then the other. SW suggest messaging Dr Henderson to see if he could place an order for this as Medicare may cover. If not, SW will send other options.    Patient shares that she would also like to start yoga or Palestinian therapy. She thinks this is something she would like to do at home. SW asks if she has Netflix, and she does not. Patient shares that she has a really " nice book of pages from one of her students with yoga moves that have pictures as well. She is also open to SW sending other options to her assistance Jossie.         Chief Complaint   Patient presents with     Clinic Care Coordination - Follow-up        [place] Utilization:   Clinic Utilization  Difficulty keeping appointments:: No  Compliance Concerns: No  No-Show Concerns: No  No PCP office visit in Past Year: No  Utilization    Last refreshed: 2/12/2021 11:32 AM: Hospital Admissions 0           Last refreshed: 2/12/2021 11:32 AM: ED Visits 0           Last refreshed: 2/12/2021 11:32 AM: No Show Count (past year) 2              Current as of: 2/12/2021 11:32 AM              Clinical Concerns:  Current Medical Concerns: Malignant neoplasm of anal canal, Femoral neck fracture, Hop fracture, dry eye syndrome, hx of total hip replacement  Current Behavioral Concerns:  NA  Education Provided to patient: Care Coordination explained  Pain  Pain (GOAL):: No  Health Maintenance Reviewed: Up to date  Clinical Pathway: None    Medication Management:  Patient is able to manage medications independently     Functional Status:  Dependent IADLs:: Shopping, Cooking  Bed or wheelchair confined:: No  Fallen 2 or more times in the past year?: No  Any fall with injury in the past year?: No    Living Situation:  Current living arrangement:: I live alone  Type of residence:: Apartment    Lifestyle & Psychosocial Needs:  Lifestyle     Physical activity     Days per week: 5 days     Minutes per session: 10 min     Stress: To some extent     Social Needs     Financial resource strain: Somewhat hard     Food insecurity     Worry: Never true     Inability: Never true     Transportation needs     Medical: No     Non-medical: No     Inadequate nutrition (GOAL):: No  Tube Feeding: No  Inadequate activity/exercise (GOAL):: No  Significant changes in sleep pattern (GOAL): No  Transportation means:: Friend, Family(Heather and sister Belen provides  transportation)     Yarsanism or spiritual beliefs that impact treatment:: No  Mental health DX:: No  Informal Support system:: Family, Friends   Socioeconomic History     Marital status: Single     Spouse name: Not on file     Number of children: Not on file     Years of education: Not on file     Highest education level: Not on file     Tobacco Use     Smoking status: Never Smoker     Smokeless tobacco: Never Used   Substance and Sexual Activity     Alcohol use: No     Drug use: No     Sexual activity: Never       Resources and Interventions:  Current Resources:   -SW sent message to PCP requesting order for back brace.  -SW to send Yoga resources to email on file      Employment Status: retired ballet instructor)   )    Advance Care Plan/Directive  Advanced Care Plans/Directives on file:: Yes          Goals:   Goals        General    Psychosocial (pt-stated)     Notes - Note created  2/12/2021 11:56 AM by Lara Campos LSW    Goal Statement: I would like to find a reasonably priced back brace in the next 3 months   Date Goal set: 11/12/2020  Barriers: Lack of resources  Strengths: Motivated  Date to Achieve By: 3 months  Patient expressed understanding of goal: Yes  Action steps to achieve this goal:  1.  will send message to PCP to see if he is able to place order for back brace  2. If PCP is unable to place order, SW will email other options for back braces  3. I will update CHW during outreach calls and ask for additional resources if needed      Psychosocial (pt-stated)     Notes - Note edited  2/12/2021 12:09 PM by Lara Campos LSW    Goal Statement: I would like to increase my strength through yoga in the next 6 months  Date Goal set: 11/12/2020   Barriers: Financial/Covid  Strengths: Has resources  Date to Achieve By: 6 months  Patient expressed understanding of goal: Yes  Action steps to achieve this goal:  1. I will use a printout of yoga exercises given to me by my students  2.   will send other resources to my assistant Jossie Ying. I will update CHW during outreach calls and ask for more resources if needed            Patient/Caregiver understanding: Yes       Future Appointments              In 1 month Joanie Gomez APRN CNP [facility], [facility]    In 4 months [facility], [facility]    In 4 months David Jones MD [facility], [facility]    In 6 months Zaire Madison MD [facility], [facility]          Plan:   -CHW to complete next outreach in two weeks.  -Introduction letter sent via [employer].  - will update patient once they hear back from PCP regarding back brace. Next scheduled outreach will be in 30-45 days.

## 2021-02-16 ENCOUNTER — PATIENT OUTREACH (OUTPATIENT)
Dept: CARE COORDINATION | Facility: CLINIC | Age: 81
End: 2021-02-16

## 2021-02-16 NOTE — PROGRESS NOTES
Clinic Care Coordination Contact  Program: SNAP, Health Insurance   County: [facility] Case #:  [facility] Worker:   Mnsure #:   Subscriber #:   Renewal:  Date Applied:     FRW Outreach:  2021: FRW called patient and started going through the certain pop screening as it appeared she would qualify due to income. Due to patient's asset amount she will not qualify for [place] or county benefits. FRW completed the ophelia care application with patient over the phone. She is not sure her exact Soc Security amount or the exact amount in her bank accounts so FRW will highlight those areas and her friend will help her complete. Patient would also like to discuss the application with her friend before applying. FRW will mail the ophelia care application and make outreach in 2 weeks to follow up.     Certain Population Application Screenin. Do you currently have health insurance? Yes, Medicare  2. How many people in household? 1  3. Do you file taxes, who do you file with? Yes, files on her own  4. What is your monthly income (include all tax members)? Soc security $980  5. Do you have a bank account? 4 Checking accounts (over asset)  6. Do you have social security cards and/or green cards? Soc Security       Health Insurance:    Medicare    Referral/Screening:    Is patient requesting help applying for [facility] benefits? Yes   Have you recently applied for any [facility] benefits? No   How many people in your household? 1   Do you buy/eat food together? No   What is the monthly gross income for the household (wages, social security, workers comp, and pension)?  900   [place]? No   Is this an insurance renewal? No   Is this a new insurance application request? No   Is this a ophelia care application? Yes   Any other information for the FRW? Patient would  like to see if  she qualifies  for financial  assistance and  would like ass-  istance paying  medical bills  Thank you!         Financial  Resource Worker Follow Up    Goals:   Goals Addressed as of 2/16/2021 at 2:04 PM                 Today      Ophelia Care (pt-stated)   20%    Added 2/16/21 by Emili Mares     Goal Statement: I will apply for Ophelia Care within the next 1-2 weeks if I am eligible.   Date Goal Set:  2/16/21  Date to Achieve By: 3/31/21  Action steps to achieve this goal:  1. I will finish completing the ophelia care application and mail in with the required verification documents.  2. I will connect with my Financial Resource Worker, Emili FELICIANO, at [phone] if needed.                Intervention and Education during outreach: n/a    FRW Plan: FRW will follow up in 2 weeks

## 2021-03-01 ENCOUNTER — PATIENT OUTREACH (OUTPATIENT)
Dept: CARE COORDINATION | Facility: CLINIC | Age: 81
End: 2021-03-01

## 2021-03-01 NOTE — PROGRESS NOTES
Clinic Care Coordination Contact  Community Health Worker Follow Up    Goals:   Goals Addressed as of 3/1/2021 at 2:17 PM        Patient Stated      Psychosocial (pt-stated)     Added 2/12/21 by Lara Campos LSW     Goal Statement: I would like to increase my strength through yoga in the next 6 months  Date Goal set: 11/12/2020   Barriers: Financial/Covid  Strengths: Has resources  Date to Achieve By: 6 months  Patient expressed understanding of goal: Yes  Action steps to achieve this goal:  1. 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  will send other resources to my assistant Jossie  3. I will update CHW during outreach calls and ask for more resources if needed    Updated:3/1/2021            Intervention and Education during outreach: CHW introduced self to the patient, explained role and outreach frequency.    CHW Plan: outreach patient in 1 month.    Patient stated that she is doing well has no new concerns. She has a dentist appointment on 3/2/2021 and Katherine will be  taking her.

## 2021-03-02 ENCOUNTER — PATIENT OUTREACH (OUTPATIENT)
Dept: CARE COORDINATION | Facility: CLINIC | Age: 81
End: 2021-03-02

## 2021-03-02 NOTE — PROGRESS NOTES
Clinic Care Coordination Contact  Program: [facility]   County: [facility] Case #:  [facility] Worker:   Mnsure #:   Subscriber #:   Renewal:  Date Applied:     FRW Outreach:  3/2/2021: FRW mailed the [employer] application on 21. FRW checked the guarantor account note and didn't see anything noted by [employer]. FRW called patient and left a vm with call back information. FRW will make outreach in 1 week.     Closed Encounter:   2021: FRW called patient and started going through the certain pop screening as it appeared she would qualify due to income. Due to patient's asset amount she will not qualify for [place] or [place] benefits. FRW completed the [employer] application with patient over the phone. She is not sure her exact Soc Security amount or the exact amount in her bank accounts so FRW will highlight those areas and her friend will help her complete. Patient would also like to discuss the application with her friend before applying. FRW will mail the [employer] application and make outreach in 2 weeks to follow up.     Certain Population Application Screenin. Do you currently have health insurance? Yes, Medicare  2. How many people in household? 1  3. Do you file taxes, who do you file with? Yes, files on her own  4. What is your monthly income (include all tax members)? Soc security $980  5. Do you have a bank account? 4 Checking accounts (over asset)  6. Do you have social security cards and/or green cards? Soc Security       Health Insurance:    Medicare    Referral/Screening:    Is patient requesting help applying for county benefits? Yes   Have you recently applied for any county benefits? No   How many people in your household? 1   Do you buy/eat food together? No   What is the monthly gross income for the household (wages, social security, workers comp, and pension)?  900   [place]? [place]   Is this an insurance renewal? No   Is this a new insurance  application request? No   Is this a ophelia care application? Yes   Any other information for the FRW? Patient would  like to see if  she qualifies  for financial  assistance and  would like ass-  istance paying  medical bills  Thank you!         Financial Resource Worker Follow Up    Goals:       Intervention and Education during outreach: n/a    FRW Plan: FRW will make outreach in 1 week.

## 2021-03-09 ENCOUNTER — PATIENT OUTREACH (OUTPATIENT)
Dept: CARE COORDINATION | Facility: CLINIC | Age: 81
End: 2021-03-09

## 2021-03-09 NOTE — PROGRESS NOTES
Clinic Care Coordination Contact  Program: [facility]   County: [facility] Case #:  [facility] Worker:   Mnsure #:   Subscriber #:   Renewal:  Date Applied:     FRW Outreach:  3/9/2021: FRW received a vm from patient.  FRW checked the guarantor account notes and did not see anything noted by ophelia care. FRW called patient back and left a vm with call back information. FRW will make one more outreach in 1 week. Patient called FRW back and was asking a lot of repeating questions regarding the ophelia care application and seems very confused. Patient is going to talk her friend Heather to see if she can get copies of things to send in with the application. Patient will call FRW back if she sends in the application otherwise FRW will follow up in 2 weeks.    Closed Encounter:  3/2/2021: FRW mailed the ophelia care application on 21. FRW checked the guarantor account note and didn't see anything noted by ophelia care. FRW called patient and left a vm with call back information. FRW will make outreach in 1 week.    2021: FRW called patient and started going through the certain pop screening as it appeared she would qualify due to income. Due to patient's asset amount she [place] or [place] benefits. FRW completed the ophelia care application with patient over the phone. She is not sure her exact Soc Security amount or the exact amount in her bank accounts so FRW will highlight those areas and her friend will help her complete. Patient would also like to discuss the application with her friend before applying. FRW will mail the [employer] application and make outreach in 2 weeks to follow up.     Certain Population Application Screenin. Do you currently have health insurance? Yes, Medicare  2. How many people in household? 1  3. Do you file taxes, who do you file with? Yes, files on her own  4. What is your monthly income (include all tax members)? Soc security $980  5. Do you have a bank  account? 4 Checking accounts (over asset)  6. Do you have social security cards and/or green cards? Soc Security       Health Insurance:    Medicare    Referral/Screening:    Is patient requesting help applying for [facility] benefits? Yes   Have you recently applied for any county benefits? No   How many people in your household? 1   Do you buy/eat food together? No   What is the monthly gross income for the household (wages, social security, workers comp, and pension)?  900   [place]? [place]   Is this an insurance renewal? No   Is this a new insurance application request? No   Is this a ophelia care application? Yes   Any other information for the FRW? Patient would  like to see if  she qualifies  for financial  assistance and  would like ass-  istance paying  medical bills  Thank you!         Financial Resource Worker Follow Up    Goals:       Intervention and Education during outreach: n/a    FRW Plan: FRW will make outreach in 2 weeks

## 2021-03-10 ENCOUNTER — IMMUNIZATION (OUTPATIENT)
Dept: NURSING | Facility: CLINIC | Age: 81
End: 2021-03-10
Payer: MEDICARE

## 2021-03-10 PROCEDURE — 0031A PR COVID VAC JANSSEN AD26 0.5ML: CPT

## 2021-03-10 PROCEDURE — 91303 PR COVID VAC JANSSEN AD26 0.5ML: CPT

## 2021-03-19 ENCOUNTER — PATIENT OUTREACH (OUTPATIENT)
Dept: CARE COORDINATION | Facility: CLINIC | Age: 81
End: 2021-03-19

## 2021-03-19 NOTE — PROGRESS NOTES
Clinic Care Coordination Contact  [facility]/Voicemail       Clinical Data: Care Coordinator Outreach  Outreach attempted x 1.  Left message on patient's voicemail with call back information and requested return call.  Plan:  will attempt to reach patient again in the next 7-10 business days.

## 2021-03-23 ENCOUNTER — PATIENT OUTREACH (OUTPATIENT)
Dept: CARE COORDINATION | Facility: CLINIC | Age: 81
End: 2021-03-23

## 2021-03-23 NOTE — PROGRESS NOTES
Clinic Care Coordination Contact  Program: [facility]   County: [place] Outreach:  3/23/2021: FRW called patient and she states she hasn't sent in her ophelia care application yet. We went over the application and and some of her questions. Patient states she plans on sending it in soon. FRW will follow up with patient in 2 weeks.     Closed Encounter:  3/9/2021: FRW received a vm from patient.  FRW checked the guarantor account notes and did not see anything noted by [email or web address]. FRW called patient back and left a vm with call back information. FRW will make one more outreach in 1 week. Patient called FRW back and was asking a lot of repeating questions regarding the ophelia care application and seems very confused. Patient is going to talk her friend Heather to see if she can get copies of things to send in with the application. Patient will call FRW back if she sends in the application otherwise FRW will follow up in 2 weeks.  3/2/2021: FRW mailed the ophelia care application on 21. FRW checked the guarantor account note and didn't see anything noted by [email or web address]. FRW called patient and left a vm with call back information. FRW will make outreach in 1 week.    2021: FRW called patient and started going through the certain pop screening as it appeared she would qualify due to income. Due to patient's asset amount she will not qualify for [place] or [facility] benefits. FRW completed the ophelia care application with patient over the phone. She is not sure her exact Soc Security amount or the exact amount in her bank accounts so FRW will highlight those areas and her friend will help her complete. Patient would also like to discuss the application with her friend before applying. FRW will mail the ophelia care application and make outreach in 2 weeks to follow up.     Certain Population Application Screenin. Do you currently have health insurance? Yes, Medicare  2. How many people in household?  1  3. Do you file taxes, who do you file with? Yes, files on her own  4. What is your monthly income (include all tax members)? Soc security $980  5. Do you have a bank account? 4 Checking accounts (over asset)  6. Do you have social security cards and/or green cards? Soc Security       Health Insurance:    Medicare    Referral/Screening:    Is patient requesting help applying for [facility] benefits? Yes   Have you recently applied for any county benefits? No   How many people in your household? 1   Do you buy/eat food together? No   What is the monthly gross income for the household (wages, social security, workers comp, and pension)?  900   [place] No   Is this an insurance renewal? No   Is this a new insurance application request? No   Is this a ophelia care application? Yes   Any other information for the FRW? Patient would  like to see if  she qualifies  for financial  assistance and  would like ass-  istance paying  medical bills  Thank you!         Financial Resource Worker Follow Up    Goals:       Intervention and Education during outreach: n/a    FRW Plan: FRW will make outreach in 2 weeks

## 2021-03-25 ENCOUNTER — VIRTUAL VISIT (OUTPATIENT)
Dept: RADIATION ONCOLOGY | Facility: CLINIC | Age: 81
End: 2021-03-25
Attending: NURSE PRACTITIONER
Payer: MEDICARE

## 2021-03-25 DIAGNOSIS — C21.1 MALIGNANT NEOPLASM OF ANAL CANAL (H): Primary | ICD-10-CM

## 2021-03-25 NOTE — LETTER
"3/25/2021      RE: Elizabteh Taylor  [address]  [place] [address]       Elizabeth Taylor is a 80 year old female who is being evaluated via a billable telephone visit.      The patient has been notified of following:     \"This telephone visit will be conducted via a call between you and your physician/provider. We have found that certain health care needs can be provided without the need for a physical exam.  This service lets us provide the care you need with a short phone conversation.  If a prescription is necessary we can send it directly to your pharmacy.  If lab work is needed we can place an order for that and you can then stop by our lab to have the test done at a later time.    Telephone visits are billed at different rates depending on your insurance coverage. During this emergency period, for some insurers they may be billed the same as an in-person visit.  Please reach out to your insurance provider with any questions.    If during the course of the call the physician/provider feels a telephone visit is not appropriate, you will not be charged for this service.\"    Patient has given verbal consent for Telephone visit?  Yes    What phone number would you like to be contacted at? [phone]    How would you like to obtain your AVS? 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 Staging evaluation revealed a 2.5 cm mass located approximately 1 cm from the anal verge with involvement of the internal anal sphincter but not the external anal sphincter or levator ani. Two FDG-avid left inguinal nodes were also appreciated.  She received curative-intent chemoradiotherapy as described above with concurrent mitomycin-C and 5-FU. Mitomycin was held during the second cycle of systemic therapy due to concern for treatment-induced toxicities given her age and borderline functional status.    She was last seen by Dr. Rosas in colorectal surgery on 6/15/2020 at which time flexible sigmoidoscopy showed a 2 cm scar at the dentate line but no evidence of recurrent disease. PET/CT on 2020 and MRI pelvis on 2020 showed no evidence of metastatic or recurrent disease.     Ms. Taylor is being called for a routine post-treatment disease surveillance visit. She states she continues to get stronger and is now able to get up off the floor without assistance and she is very happy about that. She denies any pain, bleeding or bowel changes.  She has some urgency but that is unchanged and usually after eating. She has no concerns.  Her good friend Heather continues to help her.    LABS AND IMAGIN20 MRI pelvis:  No evidence of residual disease    20 CT chest/abdomen/pelvis:  No evidence of metastatic disease    IMPRESSION:   Ms. 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 She is currently 3 years out from the completion of treatment and remains clinically MARGARET and has no concerns.    PLAN:   1. Follow-up in radiation oncology clinic with MD in 6 months ongoing disease surveillance  2. Continue follow-up with colorectal surgery  and medical oncology as scheduled        Jonaie Gomez NP  Dept of Radiation Oncology  [facility]

## 2021-03-25 NOTE — LETTER
"    3/25/2021         RE: Elizabeth Taylor  [address]  [place] [address]        Dear Colleague,    Thank you for referring your patient, Elizabeth Taylor, to the [facility] RADIATION ONCOLOGY. Please see a copy of my visit note below.    Elizabeth Taylor is a 80 year old female who is being evaluated via a billable telephone visit.      The patient has been notified of following:     \"This telephone visit will be conducted via a call between you and your physician/provider. We have found that certain health care needs can be provided without the need for a physical exam.  This service lets us provide the care you need with a short phone conversation.  If a prescription is necessary we can send it directly to your pharmacy.  If lab work is needed we can place an order for that and you can then stop by our lab to have the test done at a later time.    Telephone visits are billed at different rates depending on your insurance coverage. During this emergency period, for some insurers they may be billed the same as an in-person visit.  Please reach out to your insurance provider with any questions.    If during the course of the call the physician/provider feels a telephone visit is not appropriate, you will not be charged for this service.\"    Patient has given verbal consent for Telephone visit?  Yes    What phone number would you like to be contacted at? [phone]    How would you like to obtain your AVS? 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 Staging evaluation revealed a 2.5 cm mass located approximately 1 cm from the anal verge with involvement of the internal anal sphincter but not the external anal sphincter or levator ani. Two FDG-avid left inguinal nodes were also appreciated.  She received curative-intent chemoradiotherapy as described above with concurrent mitomycin-C and 5-FU. Mitomycin was held during the second cycle of systemic therapy due to concern for treatment-induced toxicities given her age and borderline functional status.    She was last seen by Dr. Rosas in colorectal surgery on 6/15/2020 at which time flexible sigmoidoscopy showed a 2 cm scar at the dentate line but no evidence of recurrent disease. PET/CT on 2020 and MRI pelvis on 2020 showed no evidence of metastatic or recurrent disease.     Ms. Taylor is being called for a routine post-treatment disease surveillance visit. She states she continues to get stronger and is now able to get up off the floor without assistance and she is very happy about that. She denies any pain, bleeding or bowel changes.  She has some urgency but that is unchanged and usually after eating. She has no concerns.  Her good friend Heather continues to help her.    LABS AND IMAGIN20 MRI pelvis:  No evidence of residual disease    20 CT chest/abdomen/pelvis:  No evidence of metastatic disease    IMPRESSION:   Ms. 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 She is currently 3 years out from the completion of treatment and  remains clinically MARGARET and has no concerns.    PLAN:   1. Follow-up in radiation oncology clinic with MD in 6 months ongoing disease surveillance  2. Continue follow-up with colorectal surgery and medical oncology as scheduled        Joanie Gomez NP  Dept of Radiation Oncology  [facility]        Again, thank you for allowing me to participate in the care of your patient.        Sincerely,        GRAHAM Cook CNP

## 2021-03-25 NOTE — PROGRESS NOTES
"Elizabeth Taylor is a 80 year old female who is being evaluated via a billable telephone visit.      The patient has been notified of following:     \"This telephone visit will be conducted via a call between you and your physician/provider. We have found that certain health care needs can be provided without the need for a physical exam.  This service lets us provide the care you need with a short phone conversation.  If a prescription is necessary we can send it directly to your pharmacy.  If lab work is needed we can place an order for that and you can then stop by our lab to have the test done at a later time.    Telephone visits are billed at different rates depending on your insurance coverage. During this emergency period, for some insurers they may be billed the same as an in-person visit.  Please reach out to your insurance provider with any questions.    If during the course of the call the physician/provider feels a telephone visit is not appropriate, you will not be charged for this service.\"    Patient has given verbal consent for Telephone visit?  Yes    What phone number would you like to be contacted at? [phone]    How would you like to obtain your AVS? 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 Staging evaluation revealed a 2.5 cm mass located approximately 1 cm from the anal verge with involvement of the internal anal sphincter but not the external anal sphincter or levator ani. Two FDG-avid left inguinal nodes were also appreciated.  She received curative-intent chemoradiotherapy as described above with concurrent mitomycin-C and 5-FU. Mitomycin was held during the second cycle of systemic therapy due to concern for treatment-induced toxicities given her age and borderline functional status.    She was last seen by Dr. Rosas in colorectal surgery on 6/15/2020 at which time flexible sigmoidoscopy showed a 2 cm scar at the dentate line but no evidence of recurrent disease. PET/CT on 2020 and MRI pelvis on 2020 showed no evidence of metastatic or recurrent disease.     Ms. Taylor is being called for a routine post-treatment disease surveillance visit. She states she continues to get stronger and is now able to get up off the floor without assistance and she is very happy about that. She denies any pain, bleeding or bowel changes.  She has some urgency but that is unchanged and usually after eating. She has no concerns.  Her good friend Heather continues to help her.    LABS AND IMAGIN20 MRI pelvis:  No evidence of residual disease    20 CT chest/abdomen/pelvis:  No evidence of metastatic disease    IMPRESSION:   Ms. 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 She is currently 3 years out from the completion of treatment and remains clinically MARGARET and has no concerns.    PLAN:   1. Follow-up in radiation oncology clinic with MD in 6 months ongoing disease surveillance  2. Continue follow-up with colorectal surgery and medical oncology as scheduled        Joanie Gomez NP  Dept of Radiation  Oncology  [facility]

## 2021-03-27 ENCOUNTER — HEALTH MAINTENANCE LETTER (OUTPATIENT)
Age: 81
End: 2021-03-27

## 2021-03-31 ENCOUNTER — PATIENT OUTREACH (OUTPATIENT)
Dept: CARE COORDINATION | Facility: CLINIC | Age: 81
End: 2021-03-31

## 2021-03-31 NOTE — PROGRESS NOTES
"Clinic Care Coordination Contact  [facility]/Voicemail       Clinical Data: Care Coordinator Outreach  Outreach attempted x 1.  Left message on patient's voicemail with call back information and requested return call.  asked that patient call back to discuss back brace.  Plan: If CCSW does not hear back from pt, they will attempt next outreach in 7-10 days.    Addendum 3/31/21: Recvd call from patient who shares that her friend Heather brought her a back brace which was an \"easter gift\"  that is wonderful as she can put it on herself and is this is huge plus. Pt shares that Heather also brought her some ice cream treats.    Pt shares that she can now stretch on the floor and get up which she is very proud of. Pt shares that she can feel she is getting stronger and is finding her exercises are getting easier.    Pt's sister Sahil has been working with patient's bank accounts and getting all of her income organized for pt.    Pt plans to update HAIDER Henderson with new back brace and also let him know that her left calf is swollen. Spasms are better, but still happening.    Plan: CCSW will complete next outreach in 30 days.  "

## 2021-04-05 ENCOUNTER — PATIENT OUTREACH (OUTPATIENT)
Dept: CARE COORDINATION | Facility: CLINIC | Age: 81
End: 2021-04-05

## 2021-04-05 NOTE — PROGRESS NOTES
Clinic Care Coordination Contact  Program: [facility]   County: [place]       [place] Outreach:  4/5/2021: FRW checked the guarantor account notes and didn't see anything noted by [employer] and also noticed patient no longer has a balance due. FRW called patient and asked if she has sent in her ophelia care application yet. Patient is not sure if she mailed it in or not and said she will look around for it. FRW will follow up in 3 week to see if patient has a balance at that time.   3/23/2021: FRW called patient and she states she hasn't sent in her ophelia care application yet. We went over the application and and some of her questions. Patient states she plans on sending it in soon. FRW will follow up with patient in 2 weeks.   3/9/2021: FRW received a vm from patient.  FRW checked the guarantor account notes and did not see anything noted by [employer]. FRW called patient back and left a vm with call back information. FRW will make one more outreach in 1 week. Patient called FRW back and was asking a lot of repeating questions regarding the ophelia care application and seems very confused. Patient is going to talk her friend Heather to see if she can get copies of things to send in with the application. Patient will call FRW back if she sends in the application otherwise FRW will follow up in 2 weeks.  3/2/2021: FRW mailed the [employer] care application on 2/17/21. FRW checked the guarantor account note and didn't see anything noted by [employer] care. FRW called patient and left a vm with call back information. FRW will make outreach in 1 week.    2/16/2021: FRW called patient and started going through the certain pop screening as it appeared she would qualify due to income. Due to patient's asset amount she [place] not qualify for [place] or [place] benefits. W completed the ophelia care application with patient over the phone. She is not sure her exact Soc Security amount or the exact amount in her bank accounts so DEANDRE  will highlight those areas and her friend will help her complete. Patient would also like to discuss the application with her friend before applying. FRW will mail the ophelia care application and make outreach in 2 weeks to follow up.     Certain Population Application Screenin. Do you currently have health insurance? Yes, Medicare  2. How many people in household? 1  3. Do you file taxes, who do you file with? Yes, files on her own  4. What is your monthly income (include all tax members)? Soc security $980  5. Do you have a bank account? 4 Checking accounts (over asset)  6. Do you have social security cards and/or green cards? Soc [employer]       Health Insurance:    Medicare    Referral/Screening:    Is patient requesting help applying for county benefits? Yes   Have you recently applied for any county benefits? No   How many people in your household? 1   Do you buy/eat food together? No   What is the monthly gross income for the household (wages, social security, workers comp, and pension)?  900   [place] No   Is this an insurance renewal? No   Is this a new insurance application request? No   Is this a ophelia care application? Yes   Any other information for the FRW? Patient would  like to see if  she qualifies  for financial  assistance and  would like ass-  istance paying  medical bills  Thank you!         Financial Resource Worker Follow Up    Goals:       Intervention and Education during outreach: n/a    FRW Plan: FRW will make outreach in 3 weeks

## 2021-04-12 DIAGNOSIS — M62.838 MUSCLE SPASMS OF BOTH LOWER EXTREMITIES: ICD-10-CM

## 2021-04-14 NOTE — TELEPHONE ENCOUNTER
Routing refill request to provider for review/approval because:  Drug not on the [facility] refill protocol     last filled: 10/6/2020 #240 x 1 refill    Last visit: 2/3/2021    Thank you

## 2021-04-16 RX ORDER — GABAPENTIN 100 MG/1
CAPSULE ORAL
Qty: 240 CAPSULE | Refills: 1 | Status: ON HOLD | OUTPATIENT
Start: 2021-04-16 | End: 2021-07-22

## 2021-04-21 ENCOUNTER — PATIENT OUTREACH (OUTPATIENT)
Dept: CARE COORDINATION | Facility: CLINIC | Age: 81
End: 2021-04-21

## 2021-04-21 NOTE — TELEPHONE ENCOUNTER
UPdate for Jann Henderson. Hold for PCP.  MADELEINE Gonzalez      Clinic Care Coordination Contact  Care Team Conversations     EARL GUZMAN received message from patient this morning stating she was in a lot of pain due to muscle spasms. Patient shares in her message that she called the emergency department last night and someone came. She is hoping to get in to see Dr Santiago ROBERTS.     EARL GUZMAN tried calling Elizabeth but phone line was busy.     EARL GUZMAN called Odin and requested a nurse from Dr Henderson's team reach out ASAP.       TRIAGE NOTE-  I talked to pt this morning.  Pt's biggest issue this morning was getting her gabapentin for her back spasms.  This was handled.      I called pt for a lengthy conversation.  I talked to pt and her sister, Sahil.  Pt says her gabapentin really helped her back.  Pt is able to talk with much better relief this afternoon.  Offered an appt with Jann Henderson.  PT wants to wait for her friend, Heather, to be in town to attend.  Pt will call clinic to make appt.  PT talks about a shoulder issue. Had episode on 4/20/21 when pt almost dropped things like a coffee cup.  Right pain from right shoulder to lower back.    Pt says she has a 20 item list of things to review with PCP.  I told her that appt would really only be able to cover a few things in one appt.  Sure seems like shoulder and back should be assessed.  I talked to Sahil. She didn't think pt needed an acute appt.  I offered an appt with pcp for next week. Pt deferred.     MADELEINE Gonzalez

## 2021-04-21 NOTE — TELEPHONE ENCOUNTER
Covering for Jann. Please see message from SW and reach out to patient.    Mercedes Elise MD  [facility]  [phone]

## 2021-04-21 NOTE — PROGRESS NOTES
Clinic Care Coordination Contact  Care Team Conversations    EARL GUZMAN received message from patient this morning stating she was in a lot of pain due to muscle spasms. Patient shares in her message that she called the emergency department last night and someone came. She is hoping to get in to see Dr Santiago ROBERTS.    EARL GUZMAN tried calling Elizabeth but phone line was busy.    EARL GUZMAN called Claudine Sousa and requested a nurse from Dr Henderson's team reach out ASAP.

## 2021-04-23 ENCOUNTER — PATIENT OUTREACH (OUTPATIENT)
Dept: CARE COORDINATION | Facility: CLINIC | Age: 81
End: 2021-04-23

## 2021-04-23 ENCOUNTER — TRANSFERRED RECORDS (OUTPATIENT)
Dept: HEALTH INFORMATION MANAGEMENT | Facility: CLINIC | Age: 81
End: 2021-04-23

## 2021-04-23 NOTE — PROGRESS NOTES
Clinic Care Coordination Contact    Follow Up Progress Note      Assessment: (Late entry)EARL GUZMAN recvd voicemail yesterday from patient stating she was in a lot of pain due to her spasms. Patient shares that EMS came last night.    EARL GUZMAN called and left a message for pt's care team to reach out ASAP to assess. EARL GUZMAN did receive message from PCP nurse informing they were able to fill gabapentin     Attempted to reach patient to see how she is doing but phone line is busy.  Goals addressed this encounter:   Goals Addressed                 This Visit's Progress       Patient Stated      Psychosocial (pt-stated)   On track     Goal Statement: I would like to increase my strength through yoga in the next 6 months  Date Goal set: 11/12/2020   Barriers: Financial/Covid  Strengths: Has resources  Date to Achieve By: 6 months  Patient expressed understanding of goal: Yes  Action steps to achieve this goal:  1. 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  will send other resources to my assistant Jossie  3. I will update CHW during outreach calls and ask for more resources if needed    Updated:3/1/2021             Plan:   EARL GUZMAN will continue to follow patient.

## 2021-04-26 ENCOUNTER — PATIENT OUTREACH (OUTPATIENT)
Dept: CARE COORDINATION | Facility: CLINIC | Age: 81
End: 2021-04-26

## 2021-04-26 NOTE — PROGRESS NOTES
Clinic Care Coordination Contact  Program: [facility]   [place]: [place] Outreach:  4/26/2021: FRW checked the guarantor account note and didn't see anything noted by [employer] and patient also doesn't have a balance. FRW called patient and she states she's not sure if she mailed in the application or not. FRW advised patient she still does not have a self-pay balance due. FRW will remove patient from panel and resolve the FRW episode. Please send another referral when patient has a balance due.   4/5/2021: FRW checked the guarantor account notes and didn't see anything noted by [facility] and also noticed patient no longer has a balance due. FRW called patient and asked if she has sent in her ophelia care application yet. Patient is not sure if she mailed it in or not and said she will look around for it. FRW will follow up in 3 week to see if patient has a balance at that time.   3/23/2021: FRW called patient and she states she hasn't sent in her ophelia care application yet. We went over the application and and some of her questions. Patient states she plans on sending it in soon. FRW will follow up with patient in 2 weeks.   3/9/2021: FRW received a vm from patient.  FRW checked the guarantor account notes and did not see anything noted by [facility]. FRW called patient back and left a vm with call back information. FRW will make one more outreach in 1 week. Patient called FRW back and was asking a lot of repeating questions regarding the ophelia care application and seems very confused. Patient is going to talk her friend Heather to see if she can get copies of things to send in with the application. Patient will call FRW back if she sends in the application otherwise FRW will follow up in 2 weeks.  3/2/2021: FRW mailed the [employer] application on 2/17/21. FRW checked the guarantor account note and didn't see anything noted by [facility]. FRW called patient and left a vm with call back  information. FRW will make outreach in 1 week.    2021: FRW called patient and started going through the certain pop screening as it appeared she would qualify due to income. Due to patient's asset amount she will not qualify for [place] or [place] benefits. FRW completed the ophelia care application with patient over the phone. She is not sure her exact Soc Security amount or the exact amount in her bank accounts so FRW will highlight those areas and her friend will help her complete. Patient would also like to discuss the application with her friend before applying. FRW will mail the ophelia care application and make outreach in 2 weeks to follow up.     Certain Population Application Screenin. Do you currently have health insurance? Yes, Medicare  2. How many people in household? 1  3. Do you file taxes, who do you file with? Yes, files on her own  4. What is your monthly income (include all tax members)? Soc security $980  5. Do you have a bank account? 4 Checking accounts (over asset)  6. Do you have social security cards and/or green cards? Soc Security       Health Insurance:    Medicare    Referral/Screening:    Is patient requesting help applying for [facility] benefits? Yes   Have you recently applied for any [facility] benefits? No   How many people in your household? 1   Do you buy/eat food together? No   What is the monthly gross income for the household (wages, social security, workers comp, and pension)?  900   Was [place] for [place]? No   Is this an insurance renewal? No   Is this a new insurance application request? No   Is this a ophelia care application? Yes   Any other information for the FRW? Patient would  like to see if  she qualifies  for financial  assistance and  would like ass-  istance paying  medical bills  Thank you!         Financial Resource Worker Follow Up    Goals:       Intervention and Education during outreach: n/a    DEANDRE Plan: n/a

## 2021-04-30 ENCOUNTER — PATIENT OUTREACH (OUTPATIENT)
Dept: CARE COORDINATION | Facility: CLINIC | Age: 81
End: 2021-04-30

## 2021-04-30 NOTE — PROGRESS NOTES
Clinic Care Coordination Contact    Follow Up Progress Note      Assessment: EARL GUZMAN called and completed follow-up with patient today.    Patient shares that she has had quite the time with her back spasms lately. Patient shares that she is doing much better and shares her experience with her ED visit on 4/23, which she says was a wonderful experience. Patient thought she went to a hospital in [facility], but SW shares that per chart review, she was at [place] in [facility].    Patient states that she used a heating pad when she got home and didn't have an appetite when she got home. Patient's sister came to help her for a few days and also provided her stimulus check to her.    THOMAS and patient reviewed upcoming medical appointments-patient wrote these down.    Patient shares with THOMAS again that she is still working on going through her books and getting those organized.    Patient thanks THOMAS for calling and checking in on her and enjoys my calls.       Goals addressed this encounter:   Goals Addressed                 This Visit's Progress       Patient Stated      Psychosocial (pt-stated)   On track     Goal Statement: I would like to increase my strength through yoga in the next 6 months  Date Goal set: 11/12/2020   Barriers: Financial/Covid  Strengths: Has resources  Date to Achieve By: 6 months  Patient expressed understanding of goal: Yes  Action steps to achieve this goal:  1. 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  will send other resources to my assistant Jossie  3. I will update CHW during outreach calls and ask for more resources if needed    Updated:3/1/2021             Intervention/Education provided during outreach: Overall well-being after ED visit     Plan:   EARL GUZMAN will continue to follow patient. Next outreach scheduled for one month

## 2021-05-10 ENCOUNTER — TELEPHONE (OUTPATIENT)
Dept: SURGERY | Facility: CLINIC | Age: 81
End: 2021-05-10

## 2021-05-10 DIAGNOSIS — Z85.048 HISTORY OF ANAL CANCER: Primary | ICD-10-CM

## 2021-05-10 NOTE — TELEPHONE ENCOUNTER
Spoke with Katherine, scheduled patient's MRI at the [facility] on 6/25/2021 at 1:00 PM, arrival time 12:00 PM (due to receiving Oral Ativan for relaxation and leg spasms). Katherine will be patient's  on MRI day and appointment day.    Patient has an appointment with Dr. Rosas for a consult and flexible sigmoidoscopy in clinic on 6/28/2021 at 1:30 PM (prior to 2 other appts at the [facility] that date for patient).

## 2021-05-10 NOTE — TELEPHONE ENCOUNTER
"Received inbound call from Katherine Canales stating that she \"is the HIPAA person for [place]\" and needs to schedule an MRI and in clinic Flexible Sigmoidoscopy with Dr. Rosas in June - preferably on the same day.    Corresponded with MADELEINE Mock via in basket, she requested that I help to schedule these appts in June, but that they cannot be on the same day. The MRI must be resulted ahead of the clinic Flex Sig appt with Dr. Rosas. It can be scheduled the day prior if that is more convenient. (for scheduling: ok to split an NCA slot if there are 4 that day)    Called Katherine avina left  msg for return call regarding patient's scheduling.  "

## 2021-05-11 RX ORDER — LORAZEPAM 1 MG/1
0.5 TABLET ORAL ONCE
Qty: 2 TABLET | Refills: 0 | Status: CANCELLED | OUTPATIENT
Start: 2021-05-12 | End: 2021-05-12

## 2021-05-12 ENCOUNTER — CARE COORDINATION (OUTPATIENT)
Dept: SURGERY | Facility: CLINIC | Age: 81
End: 2021-05-12

## 2021-05-12 NOTE — PROGRESS NOTES
Called patient's pharmacy and ordered Ativan pre MRI at patient request.  Pharmacy will call to inform patient of prescription.  Ativan: 0.5 mg po 30 - 60 minutes prior to imaging as directed by radiology staff; may repeat x1 at direction of radiology staff. Patient will need  to bring her to test and return her home.

## 2021-05-27 ENCOUNTER — TELEPHONE (OUTPATIENT)
Dept: FAMILY MEDICINE | Facility: CLINIC | Age: 81
End: 2021-05-27

## 2021-05-27 DIAGNOSIS — R63.0 LOSS OF APPETITE: ICD-10-CM

## 2021-05-27 DIAGNOSIS — R29.6 FALLS FREQUENTLY: Primary | ICD-10-CM

## 2021-05-27 NOTE — TELEPHONE ENCOUNTER
"Care Coordination-Please contact patient's caregiver, Katherine Canales, to discuss Assisted Living options for patient.  Katherine's number is: [phone].  Patient is not aware Katherine is calling about pursuing assisted living options.    Call received from Katherine Canales and spouse who stated:  1. Katherine is patient's \"essential worker\"  2. Patient is not eating and falling frequently  3. Concerned for patient's safety and well being  4. Patient currently lives in an apartment, but does not think this is a safe option any longer  5. Would like to discuss options for Assisted Living with a     Katherine Varnermars is listed on consent to communicate but categories not identified on this form.    Thank you!  HONEY RoseN, RN  [facility]        "

## 2021-05-27 NOTE — TELEPHONE ENCOUNTER
Caller states that she is the HIPPA Person for patient.  Caller is Katherine Gardeniamars, there latosha Consent to Communicate, but patient did not check any of the boxes as to what we can talk to the listed people about.  Patient is complaining of back pain.   Per caller, when Katherine was out of town in [place], patient had some falls.  On at least one occasion she called 911 but then declined transport.  A few days later patient did present to the ER at [facility] for back pain. This was 4/23/21  Caller did see the band on patient's wrist when caller returned from her trip.    Patient was seen in ER at [place], but could not tell caller which ER she was seen at.  Patient apparently called other friends while Katherine was out of town and so Katherine got several calls.  Wondering if patient is safe in her current situation.  Caller was not sure if provider should bring this up at next appointment or not as it will be upsetting to the patient.  Appointment scheduled with PCP for 6/7/21.  Cristal Núñez RN  [facility]

## 2021-05-28 ENCOUNTER — PATIENT OUTREACH (OUTPATIENT)
Dept: NURSING | Facility: CLINIC | Age: 81
End: 2021-05-28
Payer: MEDICARE

## 2021-05-28 NOTE — PROGRESS NOTES
Clinic Care Coordination Contact    [facility] sees note in chart regarding message from Katherine Canales. There is no consent to communicate on file in regards to patient. [facility] tried to call Katherine to discuss this and left voicemail without any detail.    QUYNH Dunbar   [facility] Care Coordination  Tel: [phone]

## 2021-05-28 NOTE — PROGRESS NOTES
Clinic Care Coordination Contact    Follow Up Progress Note      Assessment: [facility] called patient for pro-active monthly outreach and to introduce self, title and role as we have not worked together in the past. Patient reports she is doing well but confused about a bill she received. She feels the bill should have been paid but cant see anything in regards to payment- only a balance due. [facility] asked if there are other needs and patient reports she is doing well otherwise    [facility] located a note in the chart from FRW from the past who was working on charis care. [facility] will send message to [facility] in regards to this to help patient with charis care niles    Goals addressed this encounter:   Goals Addressed                 This Visit's Progress      Charis Care (pt-stated)   20%     Goal Statement: I will apply for [employer] Care within the next 1-2 weeks if I am eligible.   Date Goal Set:  2/16/21  Date to Achieve By: 3/31/21  Action steps to achieve this goal:  1. I will finish completing the charis care application and mail in with the required verification documents.  2. I will connect with my Financial Resource Worker, Emili FELICIANO, at [phone] if needed.                 Intervention/Education provided during outreach: [facility] will send message to charis care     Outreach Frequency: monthly    Plan: Patient will work with FRW.Care Coordinator will review progress to goals and plan of care in 6 weeks.    [facility] Care Coordination  Tel: [phone]

## 2021-06-07 ENCOUNTER — OFFICE VISIT (OUTPATIENT)
Dept: FAMILY MEDICINE | Facility: CLINIC | Age: 81
End: 2021-06-07
Payer: MEDICARE

## 2021-06-07 VITALS
OXYGEN SATURATION: 96 % | RESPIRATION RATE: 16 BRPM | BODY MASS INDEX: 21.53 KG/M2 | SYSTOLIC BLOOD PRESSURE: 106 MMHG | HEIGHT: 62 IN | HEART RATE: 84 BPM | DIASTOLIC BLOOD PRESSURE: 76 MMHG | TEMPERATURE: 98.5 F | WEIGHT: 117 LBS
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
## 2021-06-07 DIAGNOSIS — N39.0 ACUTE UTI: ICD-10-CM

## 2021-06-07 DIAGNOSIS — R53.1 WEAKNESS: ICD-10-CM

## 2021-06-07 DIAGNOSIS — R79.9 ABNORMAL FINDING OF BLOOD CHEMISTRY, UNSPECIFIED: ICD-10-CM

## 2021-06-07 DIAGNOSIS — R63.0 LOSS OF APPETITE: ICD-10-CM

## 2021-06-07 DIAGNOSIS — R41.3 MEMORY CHANGES: ICD-10-CM

## 2021-06-07 DIAGNOSIS — M41.9 SCOLIOSIS, UNSPECIFIED SCOLIOSIS TYPE, UNSPECIFIED SPINAL REGION: ICD-10-CM

## 2021-06-07 LAB
ALBUMIN UR-MCNC: NEGATIVE MG/DL
APPEARANCE UR: CLEAR
BACTERIA #/AREA URNS HPF: ABNORMAL /HPF
BILIRUB UR QL STRIP: NEGATIVE
COLOR UR AUTO: YELLOW
GLUCOSE UR STRIP-MCNC: NEGATIVE MG/DL
HGB UR QL STRIP: ABNORMAL
KETONES UR STRIP-MCNC: NEGATIVE MG/DL
LEUKOCYTE ESTERASE UR QL STRIP: NEGATIVE
NITRATE UR QL: NEGATIVE
PH UR STRIP: 5 PH (ref 5–7)
RBC #/AREA URNS AUTO: ABNORMAL /HPF
SOURCE: ABNORMAL
SP GR UR STRIP: 1.02 (ref 1–1.03)
UROBILINOGEN UR STRIP-ACNC: 0.2 EU/DL (ref 0.2–1)
WBC #/AREA URNS AUTO: ABNORMAL /HPF
WBC CLUMPS #/AREA URNS HPF: PRESENT /HPF

## 2021-06-07 PROCEDURE — 87088 URINE BACTERIA CULTURE: CPT | Performed by: PHYSICIAN ASSISTANT

## 2021-06-07 PROCEDURE — 87086 URINE CULTURE/COLONY COUNT: CPT | Performed by: PHYSICIAN ASSISTANT

## 2021-06-07 PROCEDURE — 87186 SC STD MICRODIL/AGAR DIL: CPT | Performed by: PHYSICIAN ASSISTANT

## 2021-06-07 PROCEDURE — 99215 OFFICE O/P EST HI 40 MIN: CPT | Performed by: PHYSICIAN ASSISTANT

## 2021-06-07 PROCEDURE — 81001 URINALYSIS AUTO W/SCOPE: CPT | Performed by: PHYSICIAN ASSISTANT

## 2021-06-07 ASSESSMENT — MIFFLIN-ST. JEOR: SCORE: 948.96

## 2021-06-07 NOTE — PROGRESS NOTES
Assessment & Plan     Fall, subsequent encounter  Loss of appetite  Memory changes  Scoliosis, unspecified scoliosis type, unspecified spinal region  Abnormal finding of blood chemistry, unspecified   Weakness   Elizabeth is a 80 year old female with a history of malignant neoplasm of the anal canal, scoliosis, peripheral edema, venous stasis dermatitis, osteoporosis (refused treatment), hip fracture, and malnutrition. Elizabeth is a challenging historian and of note she is here today for help but also increasingly resistant to testing and follow up care reporting concern for bills piling up and that she would rather die than continue to deal with bills. I have in the past referred her to care coordination for help with medical expenses and will again today. I am also concerned about her general safety at home and advised her that I will be asking our community paramedic team to check on her weekly moving forward. Elizabeth's posture is quite hunched forward. She has a history of biconvex coronal curvature of the spine, osteoporosis, and back pain. Our last visit was virtual in nature on 2/3/21. I referred her to ortho to follow up on this but she reports not following this treatment plan. She had a fall on 4/23/21 and was seen at the [place] ED. She reports having imaging done but I am unable to see record of that -- she is quite focused on how she had a pleasant interaction with the radiology tech but per care everywhere and ED note I do not see imaging was done. Appears she was diagnosed with musculoskeletal back pain and treated with lidocaine patch and advised to follow up with me. Today is our first follow up and of note she ambulates slowly and demonstrates significant anxiety if there is not something nearby to hold onto for support. This is new. Her mental status seems changed from prior visits as well. She has historically been a circumstantial historian but this visit seems increasingly so. Also more resistant to  help from provider with assistance during exam for example refuses help with putting on shoes after lower extremity exam. She also makes comments of annoyance directed toward close friend/caretaker Heather for having left on vacation recently seems somewhat tongue in cheek but generally her affect appears more annoyed than normal. There is also scent of urine today which is not typical. Elizabeth did arrive 10 minutes after her appointment time and it was a 20 minute appointment in total which did not leave us adequate time to address all of her concerns. Generally she should be scheduled for longer appointment times due to difficulty with history taking/exam. I am placing orders for lab workup today including UA, CBC, CMP, iron, ESR, CRP. Also would like Elizabeth to set up a follow up appointment with neurology to address memory changes. Her two companions during our visit are rather tight lipped and report they are not entirely sure what has transpired with Elizabeth more recently as it seems she has deteriorated. Her weight remains stable, does have poor appetite however. Will appreciate recs from care coordination team and community paramedics moving forward. Plan to follow up again with me in 1 week for recheck and to go over lab results. ADDENDUM: patient left before completing blood work today. We were able to get a urine which has been sent to culture.  called and encouraged Elizabeth to return to clinic for blood work today but she declines. She did express concerns about cost during our visit today. Plan to get labs done at follow up appointment. If new or worsening symptoms in the next week would advise ED visit.   - CARE COORDINATION REFERRAL  - COMMUNITY PARAMEDIC REFERRAL; Future  - NEUROLOGY ADULT REFERRAL  - *UA reflex to Microscopic and Culture ([place] [facility] [place] [facility] (except [place] and [place])  - Urine Microscopic  - **Comprehensive metabolic panel FUTURE anytime; Future  - **CBC with  platelets differential FUTURE 2mo; Future  - **ESR FUTURE anytime; Future  - CRP, inflammation; Future  - **Iron and iron binding capacity FUTURE anytime; Future  - Urine Culture Aerobic Bacterial  - [facility] Order for DME - ONLY FOR DME    60 minutes spent on the date of the encounter doing chart review, history and exam, documentation and further activities per the note      Return for care coordination, community paramedics, neurology.    CHELS[facility]    Gertrudis Johnson is a 80 year old who presents for the following health issues     HPI     Back Pain  Onset/Duration: when fell down in April 23, 2021  Description:   Location of pain: low back bilateral, middle of back bilateral and upper back bilateral  Character of pain: no pain currently. Gets spasms in the night   Pain radiation: none  New numbness or weakness in legs, not attributed to pain: YES-left leg  Intensity: mild currently   Progression of Symptoms: improving  History:   Specific cause: fall -fell in bathroom   Pain interferes with job: not applicable  History of back problems: no prior back problems  Any previous MRI or X-rays: None  Sees a specialist for back pain: No  Alleviating factors:   Improved by: rest    Precipitating factors:  Worsened by: Nothing  Therapies tried and outcome: ibuprofen, acetaminophen (Tylenol) and rest    Reports fine PO intake  Having some issues getting to the bathroom -- diarrhea  Sleeping a lot more   Wearing diapers to help with some urinary incontinence   Does have some people who help occasionally with food  Back is so curled, feeling more weak   Noticing some memory issues     Bilateral lower extremity edema   With venous stasis rash BLE  Not elevating much at home, has trouble with compression stockings so does not wear    There are some memory concerns  Here with friend Heather and sister Sahil but both are tight lipped but nod in agreement that memory changes are an  "issue.   Elizabeth makes a note that she was upset that Heather took a recent vacation and left her alone but also jokes she shouldn't be so demanding after all     Accompanying Signs & Symptoms:  Risk of Fracture: Osteoporosis and Age >64  Risk of Cauda Equina: Does wear diapers for urgency  Risk of Infection: None  Risk of Cancer: History of cancer  Risk of Ankylosing Spondylitis: Onset at age <35, male, AND morning back stiffness  no       Review of Systems   Constitutional, HEENT, cardiovascular, pulmonary, gi and gu systems are negative, except as otherwise noted.      Objective    /76 (BP Location: Left arm, Patient Position: Sitting, Cuff Size: Adult Regular)   Pulse 84   Temp 98.5  F (36.9  C) (Oral)   Resp 16   Ht 1.575 m (5' 2\")   Wt 53.1 kg (117 lb)   LMP  (LMP Unknown)   SpO2 96%   BMI 21.40 kg/m    Body mass index is 21.4 kg/m .  Physical Exam  Vitals signs and nursing note reviewed.   HENT:      Head: Normocephalic and atraumatic.   Cardiovascular:      Rate and Rhythm: Normal rate and regular rhythm.      Heart sounds: Normal heart sounds.   Pulmonary:      Effort: Pulmonary effort is normal.      Breath sounds: Normal breath sounds.   Skin:     Comments: Bilateral lower extremity swelling L > R with chronic venous insufficiency skin changes    Neurological:      Mental Status: She is alert.   Psychiatric:      Comments: Circumstantial historian                Results for orders placed or performed in visit on 06/07/21 (from the past 24 hour(s))   *UA reflex to Microscopic and Culture ([place] [facility] (except [place] and [place])    Specimen: Midstream Urine   Result Value Ref Range    Color Urine Yellow     Appearance Urine Clear     Glucose Urine Negative NEG^Negative mg/dL    Bilirubin Urine Negative NEG^Negative    Ketones Urine Negative NEG^Negative mg/dL    Specific Gravity Urine 1.025 1.003 - 1.035    Blood Urine Trace (A) NEG^Negative    pH Urine 5.0 5.0 - 7.0 pH    " Protein Albumin Urine Negative NEG^Negative mg/dL    Urobilinogen Urine 0.2 0.2 - 1.0 EU/dL    Nitrite Urine Negative NEG^Negative    Leukocyte Esterase Urine Negative NEG^Negative    Source Midstream Urine    Urine Microscopic   Result Value Ref Range    WBC Urine 0 - 5 OTO5^0 - 5 /HPF    RBC Urine 2-5 (A) OTO2^O - 2 /HPF    WBC Clumps Present (A) NEG^Negative /HPF    Bacteria Urine Many (A) NEG^Negative /HPF     Patient left clinic before completing blood labs

## 2021-06-08 ENCOUNTER — PATIENT OUTREACH (OUTPATIENT)
Dept: OTHER | Facility: CLINIC | Age: 81
End: 2021-06-08

## 2021-06-08 NOTE — PROGRESS NOTES
Community Paramedic Program Contact  UTC/Voicemail    CP Community Health Worker Outreach  Outreach attempted x 1.  Left message on patient's voicemail with call back information and requested return call.  Plan: Community Health Worker will try to reach patient again in 1-2 business days.    Note:  Available today at 10:30 am or 2:30 pm?  From CC referral: Ok to call Heather at [phone] or Sahil [phone]    Referral information:    Order Specific Question Answer Comment   Reason(s) for visit: Initial Assessment  Home/Safety Assessment    Goals for visit(s): Diet Compliance/Weight  Decrease ER/Clinic/Ambulance Utilization    How often should patient be seen: Weekly     Preference on when patient should be seen: 1-2 Days

## 2021-06-09 NOTE — PROGRESS NOTES
Community Paramedic Program Contact  [facility]/Voicemail    CP Community Health Worker Outreach  Outreach attempted x 1.  Left message on pt's friend Katherine's voicemail with call back information and requested return call.  Plan: Community Health Worker will try to reach patient again in 1-2 business days.    Note:   Available 6/11 with CP2?

## 2021-06-09 NOTE — PROGRESS NOTES
"Community Paramedic Program  Community Health Worker Initial Outreach    Received a call back from Katherine Canales, pt's friend. She thanked me for contacting her. Katherine said that she lives \"a few blocks away from Elizabeth\" and is involved in some of her care. They've known each other for 45 years. Katherine currently goes over to pt's home to check on her, bring her meals/groceries, take out her trash and chat twice a week. Katherine said she used to \"call on days that I didn't come visit just to say hi,\" but she said now, \"Elizabeth wants me to call every day and talk for hours on end.\" She said that she would like pt to get more help at home and doesn't \"think it's safe for Elizabeth to be living on her own.\"     According to Katherine, pt is \"fiercely independent and social.\" She has lived in her apartment for many years and \"does not want to move out or leave.\" Katherine said they've talked about Assisted Living options but pt said \"I would rather die first.\" Katherine said pt had cancer treatment in the past and had to stay in nursing homes while she recovered. Pt apparently \"liked [facility] when she stayed there\" but when they bring up this topic, pt always expresses concerns about not having enough money to afford it. Katherine said pt's sister Sahil handles pt's finances and lives in [place]. They would like more information and assistance to understand Senior Living Options and funding options to pay for it.    Katherine expressed concerns about pt's eating habits and said \"Elizabeth will take a bite of food and then that's dinner for the night.\" She said pt's weight has always been between 110-120 lbs but she is concerned that pt isn't getting enough nutrition. She said that pt \"sleeps all day long\" and often doesn't wake up until noon to get a bite of food. She said pt is \"very social when she's around people and loves to engage.\" Katherine said she'd love to see pt have someone to \"come over, visit and take Elizabeth out for a walk--she would love it.\"     Pt has also " "fallen at home multiple times. Katherine said that pt has a cane and a walker that \"are sitting in her laundry room in storage.\" Pt will bring her cane places \"because she was told she has to\" but prefers that someone else help her instead of using equipment.     I explained my role and described the Community Paramedic Program for Katherine. Let her know that we can do a home safety assessment, check pt's vitals, review and set up her medications and help look into other resources/programs that could support pt at home. Katherine would like to be at the initial visit and said she's available next week in the late mornings. She suggested that I reach out to pt's sister, Sahil, to let her know about the program and initial appointment.    Gave Katherine my contact information and encouraged her to call with questions or updates. Let her know that I will call pt again this afternoon and see if we can set up the initial visit for next week. I offered to keep Katherine notified, which she agreed to and thanked me for.         "

## 2021-06-09 NOTE — PROGRESS NOTES
"Community Paramedic Program  Community Health Worker Initial Outreach      Referral source: Pt's PCP  Referral reason:    Order Specific Question Answer Comment   Reason(s) for visit: Initial Assessment  Home/Safety Assessment    Goals for visit(s): Diet Compliance/Weight  Decrease ER/Clinic/Ambulance Utilization    How often should patient be seen: Weekly     Preference on when patient should be seen: 1-2 Days      Initial visit:  Scheduled for Friday, June 11th at 11 am at 's apartment in [place].    Additional information:    Returned pt's call. She thanked me for following up and for speaking with Katherine. According to pt, Katherine came over to pt's apartment earlier this afternoon to bring her food and to call me back together. I explained my role and the reason for my call. Gave pt description of the Community Paramedic program and examples of how we support pts. Pt said she \"has great danielle\" in anything her PCP recommends and appreciates the offer to come visit her at home. Pt said \"I do need to make some changes and would welcome help from others.\" Let pt know how glad I was to hear that and that we can talk more in depth at our visit about that. Confirmed that CP Jose Schroeder and myself will do a co-visit.     Pt's sister Sahil was in the background when we were on the phone. Pt said that her sister came over to \"try and fix my answering machine because I'm having a lot of problems with it.\" Sahil will be out of town and unable to attend the initial CP visit, however, pt's sister Belen may try to attend. Pt's friend Katherine will be at the visit to support pt.    Encouraged pt to reach out with any questions or concerns or to reschedule the visit if something comes up. She said she would and thanked me for the call.         "

## 2021-06-10 NOTE — PROGRESS NOTES
"Community Paramedic Program  Community Health Worker Follow Up    Called pt to confirm our appointment tomorrow. She said \"I just spoke with Katherine and she let me know that she's coming over to my place around 10:45 am.\" Thanked her for letting me know. I asked if pt would share details of how to enter her apartment building, and she said that we can buzz her by the front entrance and she'll let us in. She asked if I could call her about 15 minutes before to confirm we're coming, which I agreed to.     Asked if pt had any other questions or concerns about our visit, and she declined. Let her know I look forward to meeting with her tomorrow and encouraged her to call if she needs to reschedule.      "

## 2021-06-11 ENCOUNTER — ALLIED HEALTH/NURSE VISIT (OUTPATIENT)
Dept: OTHER | Facility: CLINIC | Age: 81
End: 2021-06-11
Attending: PHYSICIAN ASSISTANT
Payer: MEDICARE

## 2021-06-11 DIAGNOSIS — R41.3 MEMORY CHANGES: ICD-10-CM
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
## 2021-06-11 PROCEDURE — 99207 PR COMMUNITY PARAMEDIC - PATIENT NOT BILLABLE: CPT

## 2021-06-11 RX ORDER — SULFAMETHOXAZOLE/TRIMETHOPRIM 800-160 MG
1 TABLET ORAL 2 TIMES DAILY
Qty: 10 TABLET | Refills: 0 | Status: SHIPPED | OUTPATIENT
Start: 2021-06-11 | End: 2021-06-16

## 2021-06-11 ASSESSMENT — ACTIVITIES OF DAILY LIVING (ADL): DEPENDENT_IADLS:: LAUNDRY;SHOPPING;MEAL PREPARATION;TRANSPORTATION

## 2021-06-11 NOTE — PROGRESS NOTES
"Community Paramedics Initial Visit  June 11, 2021 TIME: 11:00 am    Elizabeth Taylor is a 80 year old female being seen at home for a Community Paramedic Home visit.    Present at appointment: Pt, pt's friend Katherine Canales, Community Paramedic (CP) Jose Schroeder & CP Community Health Worker (CHW) Gardenia Luo      Resources and Interventions:    Met with pt at her apartment in [place]. I arrived at the same time as pt's friend Katherine, and pt buzzed us in and opened her door for us. Pt's apartment was small and filled with bookshelves, piles of books and stacks of paperwork and mail on the floor, desk and tables. There were rugs, towels and cords on the floor in the main walkways of pt's apartment, near the front door and entrances to her bathroom, kitchen and bedroom. Pt moved around her apartment slowly, holding onto furniture or the wall as she made her way back to the chair where she was sitting. Noted pt's cane sitting near her front door, which she did not use during our visit. According to pt's friend, pt's walker is \"sitting down in the laundry room\" and pt rarely uses it. Pt confirmed this and said \"I prefer to go slow and hang onto things with my strong arm and hand.\"     Discussed the reason for our visit and concerns that pt's PCP noted at their 6/7 office visit. Pt shared what happened when she fell in her bathroom in April, when Katherine was on vacation. Pt called 911 and went to the hospital because she could not pull herself up. Pt also reported having \"times where it feels like a switch in my brain goes off, and my brain doesn't work for a while.\" She told us that ever since then, she is \"absolutely terrified to fall again.\" I asked if pt has an alert device at home, so if she falls, she can contact someone for help. She said no, she doesn't but \"I would like to get one of those right away.\" I offered to look into a few different options and share with pt at our next visit. She agreed. Created new goal. We " "asked pt if she is open to rearranging and/or getting rid of some of the furniture that is creating potential fall hazards for pt, and she said \"maybe.\"    Asked pt what other daily tasks she would like assistance with, and she said \"someone to help me with a bath or shower.\" Pt said \"I haven't showered in a while because I'm afraid of falling again and need help getting into the tub.\" She also mentioned wanting assistance with her laundry. According to pt, the laundry room in the apartment building is in the basement, and there is no elevator. She said she needs \"help to walk down the two flights of stairs to get there and someone to carry my dirty laundry bag.\" Pt said she only has a few clean garments left. I told pt and pt's friend about the [facility] and confirmed that pt lives in their service area. I offered to contact them to see how they could help pt, if there is a wait list and to get an idea about possible fees for their services. Pt agreed and said \"the only thing I am concerned about is getting help from someone who is nosy or who I don't like.\" I let pt know at this point, I will only be gathering information and will ask about the possibility of pt meeting with a potential caregiver before accepting their services. Pt's friend Katherine asked if they can assist with in-home foot care, and I told her yes. Pt shared that \"I have a hard time putting on socks and tying my shoe laces, it's hard to bend down and reach my feet.\" Added to new goal.     Pt's friend shared that \"Elizabeth is fiercely independent and very social. 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 While speaking, she became tearful about \"everything that I have accomplished in my life.\" Pt talked at length about how passionate she is about [employer] and how she misses \"teaching and connecting with students.\" I asked if pt would be interested in having a phone  who would contact her to talk about her interests, and she said yes. I told her about [facility] [employer]'s phone  program and offered to help pt sign up for it. She said \"let me think about that and we can talk more at our next visit.\" I agreed. Added to new goal.    Pt shared concerns with us about \"being alone at night and falling.\" She said she lays diagonally on her bed so \"I won't roll off.\" CP asked if pt has considered moving into an Assisted Living facility, and she said \"no. I am exactly where I want to be.\" Encouraged pt to keep an open mind in regards to this, which she declined. Talked with pt about how we can help her connect with supports that can come into her home and our desire to help grow pt's support system. She agreed to this and said \"however much time I have left, I want to spend it here. I want to stay at home.\" Pt said she used to get some support from her neighbors but they have all moved out. Pt said \"I say hi to the neighbors now, but we keep to ourselves. I don't feel comfortable asking them for help.\" Thanked her for sharing and repeated this back to pt to let her know I heard and understood her preference.     Offered to schedule another visit with pt to follow up on the resources we discussed today. She agreed. I asked if she " "would like to have Katherine or one of her sisters present at the visit, and she said yes. Asked me to contact her sister, Sahil, to see if she is available. According to pt and Katherine, Sahil is \"in charge of finances\" and could help pt understand what she can afford. Confirmed pt's in-person appointment with her PCP on Monday at the  clinic. Katherine said she \"will give Elizabeth a call that morning to make sure she's up and ready when I come to pick her up.\" Encouraged Katherine and pt to reach out to me directly with questions, updates or concerns before our next visit. They said they would. Pt asked me to write the details about our next visit on a post it note. I did and then she put on a small folding table next to the chair in her living room. Thanked her again for her time, answering our questions and having us.     Current Resources: Pt's friend Katherine, pt's sisters Sahil & Belen, Care Coordination, Financial Resource Worker     HEALTH CARE GOALS:  Goals Addressed as of 6/14/2021 at 11:16 AM                 5/28/21 4/26/21       Patient Stated      Charis Care (pt-stated)   20%  On Hold    Added 2/16/21 by Emili Mares     Goal Statement: I will apply for [facility] Care within the next 1-2 weeks if I am eligible.   Date Goal Set:  2/16/21  Date to Achieve By: 3/31/21  Action steps to achieve this goal:  1. I will finish completing the charis care application and mail in with the required verification documents.  2. I will connect with my Financial Resource Worker, Emili FELICIANO, at [phone] if needed.            Reducing Risks (pt-stated)         Added 6/14/21 by Felisha Luo     Goal Statement: In the next three months, I want to connect with resources and supports to help keep me safe at home.  Date Goal set: 6/11/21  Barriers: finances  Strengths: support system, CP and CC support, open to making changes  Date to Achieve By: 9/11/21  Patient expressed understanding of goal: yes    Action steps to achieve this goal:  1. I " will ask the CP CHW to research options for fall alert systems and share with me at our next visit on June 18th.  2. I will speak with the CP CHW about the [facility] Nurse Program and schedule an in-home assessment if I would like to receive their services.  3. I will ask my sister Sahil to help me understand what I can pay for services at home.  4. I will let the CP CHW know when I would like to sign up for [employer] phone  program.            Time spent with patient: 120 minutes    Next CP visit scheduled: Thursday, June 17th at 2 pm (co-visit with CP & CP CHW; will invite pt's sister Sahil)    Issues for Provider to follow up on: medications    CHW Plan:  1. CP CHW will follow up with the [facility] Nurse Program to gather more information about availability, services and fees to share with pt at next visit.  2. CP CHW will research fall alert system options and share with pt at next visit.  3. CP CHW will contact pt's sister and invite her to next visit at pt's home on 6/17 at 2 pm.  4. Pt will attend her in-person visit with her PCP on Monday, June 14th.   5. Pt/pt's friend Katherine will contact the CP CHW will questions, updates or to reschedule next visit if needed.

## 2021-06-12 VITALS — DIASTOLIC BLOOD PRESSURE: 78 MMHG | TEMPERATURE: 99.8 F | HEART RATE: 80 BPM | SYSTOLIC BLOOD PRESSURE: 118 MMHG

## 2021-06-12 LAB
BACTERIA SPEC CULT: ABNORMAL
BACTERIA SPEC CULT: ABNORMAL
Lab: ABNORMAL
SPECIMEN SOURCE: ABNORMAL

## 2021-06-14 ENCOUNTER — ANCILLARY PROCEDURE (OUTPATIENT)
Dept: GENERAL RADIOLOGY | Facility: CLINIC | Age: 81
End: 2021-06-14
Attending: PHYSICIAN ASSISTANT
Payer: MEDICARE

## 2021-06-14 ENCOUNTER — PATIENT OUTREACH (OUTPATIENT)
Dept: OTHER | Facility: CLINIC | Age: 81
End: 2021-06-14

## 2021-06-14 ENCOUNTER — OFFICE VISIT (OUTPATIENT)
Dept: FAMILY MEDICINE | Facility: CLINIC | Age: 81
End: 2021-06-14
Payer: MEDICARE

## 2021-06-14 ENCOUNTER — TELEPHONE (OUTPATIENT)
Dept: FAMILY MEDICINE | Facility: CLINIC | Age: 81
End: 2021-06-14

## 2021-06-14 VITALS
HEART RATE: 66 BPM | TEMPERATURE: 97.9 F | OXYGEN SATURATION: 96 % | BODY MASS INDEX: 19.96 KG/M2 | DIASTOLIC BLOOD PRESSURE: 70 MMHG | SYSTOLIC BLOOD PRESSURE: 104 MMHG | WEIGHT: 109.12 LBS

## 2021-06-14 DIAGNOSIS — M84.454A PATHOLOGICAL FRACTURE OF PELVIS, UNSPECIFIED PATHOLOGICAL CAUSE, INITIAL ENCOUNTER: ICD-10-CM
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
## 2021-06-14 DIAGNOSIS — M41.9 SCOLIOSIS, UNSPECIFIED SCOLIOSIS TYPE, UNSPECIFIED SPINAL REGION: Primary | ICD-10-CM

## 2021-06-14 DIAGNOSIS — R79.9 ABNORMAL FINDING OF BLOOD CHEMISTRY, UNSPECIFIED: ICD-10-CM

## 2021-06-14 DIAGNOSIS — M62.838 MUSCLE SPASM: ICD-10-CM

## 2021-06-14 DIAGNOSIS — M80.00XA AGE-RELATED OSTEOPOROSIS WITH CURRENT PATHOLOGICAL FRACTURE, INITIAL ENCOUNTER: ICD-10-CM

## 2021-06-14 LAB
ALBUMIN SERPL-MCNC: 4.1 G/DL (ref 3.4–5)
ALP SERPL-CCNC: 87 U/L (ref 40–150)
ALT SERPL W P-5'-P-CCNC: 92 U/L (ref 0–50)
ANION GAP SERPL CALCULATED.3IONS-SCNC: 4 MMOL/L (ref 3–14)
AST SERPL W P-5'-P-CCNC: 47 U/L (ref 0–45)
BASOPHILS # BLD AUTO: 0 10E9/L (ref 0–0.2)
BASOPHILS NFR BLD AUTO: 0.8 %
BILIRUB SERPL-MCNC: 1.1 MG/DL (ref 0.2–1.3)
BUN SERPL-MCNC: 24 MG/DL (ref 7–30)
CALCIUM SERPL-MCNC: 9.5 MG/DL (ref 8.5–10.1)
CHLORIDE SERPL-SCNC: 106 MMOL/L (ref 94–109)
CO2 SERPL-SCNC: 31 MMOL/L (ref 20–32)
CREAT SERPL-MCNC: 0.7 MG/DL (ref 0.52–1.04)
CRP SERPL-MCNC: <2.9 MG/L (ref 0–8)
DIFFERENTIAL METHOD BLD: NORMAL
EOSINOPHIL # BLD AUTO: 0.1 10E9/L (ref 0–0.7)
EOSINOPHIL NFR BLD AUTO: 2.6 %
ERYTHROCYTE [DISTWIDTH] IN BLOOD BY AUTOMATED COUNT: 14.2 % (ref 10–15)
ERYTHROCYTE [SEDIMENTATION RATE] IN BLOOD BY WESTERGREN METHOD: 8 MM/H (ref 0–30)
GFR SERPL CREATININE-BSD FRML MDRD: 80 ML/MIN/{1.73_M2}
GLUCOSE SERPL-MCNC: 66 MG/DL (ref 70–99)
HCT VFR BLD AUTO: 39.3 % (ref 35–47)
HGB BLD-MCNC: 12.8 G/DL (ref 11.7–15.7)
LYMPHOCYTES # BLD AUTO: 0.8 10E9/L (ref 0.8–5.3)
LYMPHOCYTES NFR BLD AUTO: 16.4 %
MCH RBC QN AUTO: 32.3 PG (ref 26.5–33)
MCHC RBC AUTO-ENTMCNC: 32.6 G/DL (ref 31.5–36.5)
MCV RBC AUTO: 99 FL (ref 78–100)
MONOCYTES # BLD AUTO: 0.5 10E9/L (ref 0–1.3)
MONOCYTES NFR BLD AUTO: 9.7 %
NEUTROPHILS # BLD AUTO: 3.5 10E9/L (ref 1.6–8.3)
NEUTROPHILS NFR BLD AUTO: 70.5 %
PLATELET # BLD AUTO: 222 10E9/L (ref 150–450)
POTASSIUM SERPL-SCNC: 3.7 MMOL/L (ref 3.4–5.3)
PROT SERPL-MCNC: 7.7 G/DL (ref 6.8–8.8)
RBC # BLD AUTO: 3.96 10E12/L (ref 3.8–5.2)
SODIUM SERPL-SCNC: 141 MMOL/L (ref 133–144)
WBC # BLD AUTO: 5 10E9/L (ref 4–11)

## 2021-06-14 PROCEDURE — 72070 X-RAY EXAM THORAC SPINE 2VWS: CPT | Performed by: RADIOLOGY

## 2021-06-14 PROCEDURE — 72100 X-RAY EXAM L-S SPINE 2/3 VWS: CPT | Performed by: RADIOLOGY

## 2021-06-14 PROCEDURE — 80053 COMPREHEN METABOLIC PANEL: CPT | Performed by: PHYSICIAN ASSISTANT

## 2021-06-14 PROCEDURE — 83540 ASSAY OF IRON: CPT | Performed by: PHYSICIAN ASSISTANT

## 2021-06-14 PROCEDURE — 83550 IRON BINDING TEST: CPT | Performed by: PHYSICIAN ASSISTANT

## 2021-06-14 PROCEDURE — 82728 ASSAY OF FERRITIN: CPT | Performed by: PHYSICIAN ASSISTANT

## 2021-06-14 PROCEDURE — 86140 C-REACTIVE PROTEIN: CPT | Performed by: PHYSICIAN ASSISTANT

## 2021-06-14 PROCEDURE — 85652 RBC SED RATE AUTOMATED: CPT | Performed by: PHYSICIAN ASSISTANT

## 2021-06-14 PROCEDURE — 85025 COMPLETE CBC W/AUTO DIFF WBC: CPT | Performed by: PHYSICIAN ASSISTANT

## 2021-06-14 PROCEDURE — 99215 OFFICE O/P EST HI 40 MIN: CPT | Performed by: PHYSICIAN ASSISTANT

## 2021-06-14 PROCEDURE — 36415 COLL VENOUS BLD VENIPUNCTURE: CPT | Performed by: PHYSICIAN ASSISTANT

## 2021-06-14 NOTE — TELEPHONE ENCOUNTER
Jann-Please advise if urine results and treatment plan discussed wit patient during today's visit?    Called patient:  Left message to call back and ask to speak with an available triage nurse.    Writer called [facility], spoke with Gloria Pharmacist, who stated patient has not picked up sulfamethoxazole-trimethoprim (BACTRIM DS) 800-160 MG tablet.    Thank you!  HONEY RoseN, RN  [facility]

## 2021-06-14 NOTE — PROGRESS NOTES
"Community Paramedic Program  CHW resource follow up    Called Shayy Bailey,  for the [facility] Nurse Program ([email or web address] 234 beds at the [facility]/). Explained my role and gave general information about pt and pt's situation. Asked if Shayy could tell me more about how to refer and their availability. She said that they are \"slowly starting to do more in person since the pandemic.\" She said they typically do an \"initial phone assessment to get a better understanding of the pt, the situation and what kind of support they need.\" She said they do have a nurse who can do outreach visits in person. They need pt's financial information in order to determine if there would be a fee (sliding scale) for services.    They can help with grocery shopping, rides to medical appointments, in-home foot care and bathing (would need to check on staffing first). I let Shayy know that pt could be interested in support with all of those things, but is specifically asking for help with bathing and laundry. Shayy said they have a \"home health aide who can help with bathing, but I would have to make some calls first before committing to that.\" She said their nurse would \"also need to be present\" during that.     Shayy said they do not do \"stand alone home making services, like laundry, light housecleaning or taking out the trash.\" However, she suggested that I contact [facility] ([phone]) and said that they are taking on new clients. She said their services cost about $37/hour and their service minimum is 2 hrs/month. I thanked her for the recommendation and information.     Shayy offered to send me an electronic copy of their flyer to share with the pt. I agreed and gave her my email address. Briefly described the CP program and my role, after Shayy asked for more information. Thanked her again for her time and help.  "

## 2021-06-14 NOTE — PATIENT INSTRUCTIONS
Consult at [facility]: Neurology Clinic [facility] [phone]   [email or web address]  General Neurology    Orthopedic and Spine Care - you may contact the [facility] Representative at: [phone]

## 2021-06-14 NOTE — PROGRESS NOTES
"Community Paramedic Program  CHW resource follow up    Researched options for medical alert systems to share with pt.    Medical Alert 's Guide: \"The Best Medical Alert Systems for 2021\"  https://www.medicalalertbuyersguide.org/best-medical-alert-systems/?qmuxpucj=4981324989&jzugcez=944559081063&keyword=how%20much%20is%20life%20alert&gclid=CjwKCAjw_[id number]    Life Alert  [email or web address]://[email or web address]?Id=LifeAlert&gclid=CjwKCAjw_[id number]    [facility]  [email or web address]/#    [employer]  [email or web address]-care-[email or web address]-[email or web address]/lifeline   "

## 2021-06-14 NOTE — TELEPHONE ENCOUNTER
----- Message from Jann Henderson PA-C sent at 6/11/2021  3:23 PM CDT -----  Triage -- please call Elizabeth (or her contact Katherine and relay message below. Thank you)    Mario Johnson,  Your urine returned showing evidence of urinary tract infection.   I am sending an antibiotic to your pharmacy. I would like you to take it twice daily for the next week.   We can touch base again at your follow up appointment next week.  Thanks,  Jann Henderson

## 2021-06-14 NOTE — TELEPHONE ENCOUNTER
Noted.    No further action needed from triage.  HONEY RoseN, RN  [facility]

## 2021-06-14 NOTE — PROGRESS NOTES
Assessment & Plan     Scoliosis, unspecified scoliosis type, unspecified spinal region  Muscle spasm  Age-related osteoporosis with current pathological fracture, initial encounter  History of biconvex coronal curvature of the spine, osteoporosis (refused treatment), and back pain. Has been referred to ortho in the past but has not scheduled follow up. Manages pain with stretching exercises at home and a brace she wears for support. 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 Will update imaging of lumbar and thoracic spine today. Encouraged Elizabeth to schedule follow up with ortho for further evaluation of ongoing back pain.    - XR Lumbar Spine 2/3 Views  - XR Thoracic Spine 2 Views    Fall, subsequent encounter  Abnormal finding of blood chemistry, unspecified   Labs were ordered at last office visit but Elizabeth left the clinic before completing. Will check today. No repeat episodes of falls. Urine at last visit did show evidence of infection and antibiotics were sent to pharmacy however Elizabeth has not started them yet. Encouraged her to  the prescription and start treatment today. Await labs and return to care if new or worsening symptoms.   - **Ferritin FUTURE 2mo  - **Iron and iron binding capacity FUTURE anytime  - **Iron and iron binding capacity FUTURE anytime  - CRP, inflammation  - **ESR FUTURE anytime  - **CBC with platelets differential FUTURE 2mo  - **Comprehensive metabolic panel FUTURE anytime    40 minutes spent on the date of the encounter doing chart review, history and exam, documentation and further activities per the note    Return for community paramedic appointment, neurology assessment, ortho follow up.    Jann Henderson PA-C  [facility]    Gertrudis Johnson is a 80 year old who presents for the following  health issues  accompanied by her friend Katherine:    EDENILSON     Elizabeth is a 80 year old female with a history of malignant neoplasm of the anal canal, scoliosis, peripheral edema, venous stasis dermatitis, osteoporosis (refused treatment), hip fracture, and malnutrition. She is here today for 1 week follow up.  At last office visit Elizabeth completed urine testing but left before completing blood work. Planning to complete this today.  Her urinalysis and culture showed signs of a urinary tract infection. Antibiotics were sent to her pharmacy but she has not started treatment yet.   Since our last visit Elizabeth has been receiving visits from our community paramedic team and is following with care coordination. These interventions have been very helpful. She is very thankful.         Review of Systems   Constitutional, HEENT, cardiovascular, pulmonary, gi and gu systems are negative, except as otherwise noted.      Objective    /70 (BP Location: Right arm, Patient Position: Sitting, Cuff Size: Adult Small)   Pulse 66   Temp 97.9  F (36.6  C) (Tympanic)   Wt 49.5 kg (109 lb 1.9 oz)   LMP  (LMP Unknown)   SpO2 96%   BMI 19.96 kg/m    Body mass index is 19.96 kg/m .  Physical Exam  Vitals signs and nursing note reviewed. Exam conducted with a chaperone present.   HENT:      Head: Normocephalic and atraumatic.   Cardiovascular:      Rate and Rhythm: Normal rate and regular rhythm.      Heart sounds: Normal heart sounds.   Pulmonary:      Effort: Pulmonary effort is normal.      Breath sounds: Normal breath sounds.   Skin:     Comments: Chronic venous stasis dermatitis bilateral lower extremities L > R    Neurological:      Mental Status: She is alert. Mental status is at baseline.   Psychiatric:         Speech: Speech is tangential.         Results for orders placed or performed in visit on 06/14/21   XR Lumbar Spine 2/3 Views     Status: None    Narrative    LUMBAR SPINE TWO TO THREE VIEWS   6/14/2021 11:18 AM      HISTORY: Chronic intermittent back pain and spasm. History of biconvex  coronal curvature of the spine and L3 compression deformity.  Scoliosis, unspecified scoliosis type, unspecified spinal region.  Muscle spasm.    COMPARISON: None.      Impression    IMPRESSION: Moderate leftward convex curvature is present in the  lumbar spine. There is a superior endplate compression deformity of  L3. Remaining vertebral body heights are maintained. Diffuse  osteopenia. There appears to be retrolisthesis at L2-L3 and L1-L2 and  some spondylolisthesis at L5-S1. Mild degenerative disc disease is  seen at multiple levels. Moderate lower lumbar facet arthropathy is  present. Degenerative change also seen in the sacroiliac joints.    UZMA FELIX MD   XR Thoracic Spine 2 Views     Status: None    Narrative    THORACIC SPINE TWO VIEWS  6/14/2021 11:18 AM     HISTORY: Chronic intermittent back pain and spasm. History of biconvex  coronal curvature of the spine. Scoliosis, unspecified scoliosis type,  unspecified spinal region. Muscle spasm.    COMPARISON: None.      Impression    IMPRESSION: There is moderate lower rightward convex curvature. There  is also an exaggerated thoracic kyphosis. There is diffuse osteopenia.  Minimal compression deformity of T11 but this appears chronic.  Vertebral body heights otherwise maintained.     UZMA FELIX MD   **Ferritin FUTURE 2mo     Status: None   Result Value Ref Range    Ferritin 50 8 - 252 ng/mL   **Iron and iron binding capacity FUTURE anytime     Status: None   Result Value Ref Range    Iron 57 35 - 180 ug/dL    Iron Binding Cap 365 240 - 430 ug/dL    Iron Saturation Index 16 15 - 46 %   CRP, inflammation     Status: None   Result Value Ref Range    CRP Inflammation <2.9 0.0 - 8.0 mg/L   **ESR FUTURE anytime     Status: None   Result Value Ref Range    Sed Rate 8 0 - 30 mm/h   **CBC with platelets differential FUTURE 2mo     Status: None   Result Value Ref Range    WBC 5.0 4.0 - 11.0  10e9/L    RBC Count 3.96 3.8 - 5.2 10e12/L    Hemoglobin 12.8 11.7 - 15.7 g/dL    Hematocrit 39.3 35.0 - 47.0 %    MCV 99 78 - 100 fl    MCH 32.3 26.5 - 33.0 pg    MCHC 32.6 31.5 - 36.5 g/dL    RDW 14.2 10.0 - 15.0 %    Platelet Count 222 150 - 450 10e9/L    Diff Method Automated Method     % Neutrophils 70.5 %    % Lymphocytes 16.4 %    % Monocytes 9.7 %    % Eosinophils 2.6 %    % Basophils 0.8 %    Absolute Neutrophil 3.5 1.6 - 8.3 10e9/L    Absolute Lymphocytes 0.8 0.8 - 5.3 10e9/L    Absolute Monocytes 0.5 0.0 - 1.3 10e9/L    Absolute Eosinophils 0.1 0.0 - 0.7 10e9/L    Absolute Basophils 0.0 0.0 - 0.2 10e9/L   **Comprehensive metabolic panel FUTURE anytime     Status: Abnormal   Result Value Ref Range    Sodium 141 133 - 144 mmol/L    Potassium 3.7 3.4 - 5.3 mmol/L    Chloride 106 94 - 109 mmol/L    Carbon Dioxide 31 20 - 32 mmol/L    Anion Gap 4 3 - 14 mmol/L    Glucose 66 (L) 70 - 99 mg/dL    Urea Nitrogen 24 7 - 30 mg/dL    Creatinine 0.70 0.52 - 1.04 mg/dL    GFR Estimate 80 >60 mL/min/[1.73_m2]    GFR Estimate If Black >90 >60 mL/min/[1.73_m2]    Calcium 9.5 8.5 - 10.1 mg/dL    Bilirubin Total 1.1 0.2 - 1.3 mg/dL    Albumin 4.1 3.4 - 5.0 g/dL    Protein Total 7.7 6.8 - 8.8 g/dL    Alkaline Phosphatase 87 40 - 150 U/L    ALT 92 (H) 0 - 50 U/L    AST 47 (H) 0 - 45 U/L

## 2021-06-15 LAB
FERRITIN SERPL-MCNC: 50 NG/ML (ref 8–252)
IRON SATN MFR SERPL: 16 % (ref 15–46)
IRON SERPL-MCNC: 57 UG/DL (ref 35–180)
TIBC SERPL-MCNC: 365 UG/DL (ref 240–430)

## 2021-06-16 NOTE — PROGRESS NOTES
Elizabeth absolutely raved about you and your team at our last office visit. Thanks for the update and for taking such good care of our patients!  Best,  Jann

## 2021-06-17 ENCOUNTER — ALLIED HEALTH/NURSE VISIT (OUTPATIENT)
Dept: OTHER | Facility: CLINIC | Age: 81
End: 2021-06-17
Payer: MEDICARE

## 2021-06-17 VITALS
OXYGEN SATURATION: 98 % | HEART RATE: 80 BPM | SYSTOLIC BLOOD PRESSURE: 118 MMHG | RESPIRATION RATE: 14 BRPM | DIASTOLIC BLOOD PRESSURE: 64 MMHG | TEMPERATURE: 99.9 F

## 2021-06-17 DIAGNOSIS — R41.3 MEMORY CHANGES: ICD-10-CM
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
## 2021-06-17 PROCEDURE — 99207 PR COMMUNITY PARAMEDIC - PATIENT NOT BILLABLE: CPT

## 2021-06-17 ASSESSMENT — ACTIVITIES OF DAILY LIVING (ADL): DEPENDENT_IADLS:: LAUNDRY;SHOPPING;MEAL PREPARATION;TRANSPORTATION

## 2021-06-18 ENCOUNTER — PATIENT OUTREACH (OUTPATIENT)
Dept: OTHER | Facility: CLINIC | Age: 81
End: 2021-06-18

## 2021-06-18 NOTE — PROGRESS NOTES
"Community Paramedics Follow Up Visit  June 18, 2021 TIME: 2:00 pm    Elizabeth Taylor is a 80 year old female being seen at home for a Community Paramedic Home visit.    Present at appointment: Patient, Pt's sister Sahil, Community Paramedic (CP) Jose Schroeder, CAM Community Health Worker (CHW) Gardenia Luo        Resources and Interventions:    Met with pt and her sister Sahil at pt's apartment in [place].     Asked pt if she would share with us how her appointment on 6/14 went with her PCP. She said it went \"very well\" and she felt that her PCP \"answered all my questions and addressed all my concerns.\"      Thanked pt's sister for taking the time to come to our visit. I asked pt's permission to recap our discussion at the previous visit with Sahil and if pt was comfortable with me sharing what she'd said her main concerns and priorities were. Pt agreed and said \"you can share any information you want with my sister, she is a very important person in my life.\" Thanked pt and reviewed what pt said were her top three priorities for help at home--baths, laundry and purchasing a fall alert system/device. I let pt and Sahil know that I followed up with local resources and wanted to report back my findings during today's visit.     Briefly described my conversation with Shayy Bailey, the  from the [facility] Nurse program. I explained how they could support pt (bathing, foot care, grocery shopping, rides to medical appointments) and let pt and Sahil know that they will need pt's financial information in order to determine if she would need to pay a fee for services. Encouraged pt and pt's sister to reach out to Shayy directly so she can gather the information she needs and set up a time to meet pt. Pt asked if she could clarify my role and said \"I was under the impression you were the person who was going to come out and help me with all of these things at home.\" I clarified my role and let her know " "that I am here to help connect her with resources in the community, not to give pt direct cares. She said she understood and that she is interested in connecting with Shayy. Offered to send the information to Sahil and pt's friend Katherine after our visit, and pt and Sahil agreed. Updated goal.    Shared information about [employer] as a resource for pt to get assistance with her laundry, cleaning and other tasks at home that Sahil and Katherine are currently doing for pt during the week. Let pt know that they are accepting new clients and gave them the details about the cost and the minimum service amount they require each month. Pt expressed concern about the cost. I asked Sahil if she would be willing to talk more about pt's finances, and she agreed. 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 Sahil indicated that pt does have means to pay for in-home services and said she \"will help Elizabeth understand this piece.\" Thanked her for this and offered to send the information about [employer] [facility] [employer] for their review. Updated goal.    Talked with pt and Sahil about my research into fall alert systems and devices and the various options on the market. Reviewed [employer], [facility] and [facility]'s Life[employer]. Pt's sister expressed concern about pt \"having to sign up for a 3 year contract through [employer]\" and said she was \"leaning more towards ones that have more flexibility, where you could end the service at any time.\" Pt agreed. Told them I will send this information for their review. Sahil said she will ask her , Hugo, to help with this piece. Updated goal.    Pt showed us an envelope she \"found leaning against my door last night.\" It was a letter and paperwork from [employer], the company that owns the apartment building " "where pt lives. She briefly read some of the letter out loud to us. Pt is being asked to renew her lease, if she is interested, and could choose to do a month-to-month ($1,054 rent) or full year ($959) agreement. Pt said she had questions about this and wanted to \"make sure I understand what I'm signing up for.\" Sahil said she would be happy to review this later with pt and help her get the information she needs so she can make a decision. Pt said \"I want to stay here for another year at least.\" She again indicated that \"this is the place where I want to die\" and talked about the things she would like to do and prepare before that time comes. I plan to check in with pt about Advanced Care Planning at a future time, as she has specific preferences and wishes about her belongings and who should get them.    Noted pt's lower left leg, ankle and foot appeared swollen and discolored. CP asked pt how it is feeling and if it looks similar or worse than it has recently. Pt talked about the exercises and stretches she does constantly throughout the day. She explained to us how she gets down onto the floor, scoots over to a tall filing cabinet in her living room and puts her ankles up. Pt said \"I do this for 15 to 30 minutes every day, and I think it has helped.\" She said that she feels that the \"swelling has been reduced and my ankle looks much better than it did.\" Noted that pt's toenails are long and asked if they cause her any pain or difficulty when she puts on socks or shoes. She said no. Pt attempted to bend over and touch her toes while seated in a chair and was not able to reach them. I again mentioned the [facility] Nurse Program and let her know that they have a nurse who could help care for pt's feet.     Offered to follow up with pt by phone in 2 weeks, and she agreed. I let her know that during that call, we can discuss setting up another visit. She agreed. I let her know that I would like to make sure pt " "connects with the resources I've shared, if she is interested. Let Sahil know that I will send an email to her and Katherine with the information we discussed today. She agreed. I encouraged pt to think about the support she may need/want to make some of these initial calls. She said she would and thanked us for our \"time, support and guidance.\"        HEALTH CARE GOALS:  Goals Addressed as of 6/18/2021 at 9:03 AM                 6/17/21       Patient Stated      Reducing Risks (pt-stated)   30%    Added 6/14/21 by Felisha Luo     Goal Statement: In the next three months, I want to connect with resources and supports to help keep me safe at home.  Date Goal set: 6/11/21  Barriers: finances  Strengths: support system, CP and CC support, open to making changes  Date to Achieve By: 9/11/21  Patient expressed understanding of goal: yes    Action steps to achieve this goal:  1. In the next two weeks, I will call the [facility] Nurse Program (Shayy Bailey, [phone]) and connect with supports they offer (bathing, foot care, shopping and rides to medical appointments).   2. I will ask my sister Sahil and her  Hugo to help me understand what I can pay for services at home.  3. With my sister and brother-in-law's help, I will review fall alert system options that the CP CHW shared, choose the best fit for me and purchase one in the next two weeks.  4. I will let the CP CHW know when I would like to sign up for [employer] [facility] [employer] [facility]'s phone  program.    Updated: 6/17/21          CHW Plan:  1. CP CHW will send resource information to pt's sister Sahil and pt's friend Katherine.  2. Pt will ask her sister and friend for help to connect with the resources she is interested in in the next two weeks.  3. Pt will review the letter from [employer] with her sister and decide how she would like to renew her lease.  4. CP CHW will follow up with pt by phone in 2 weeks.      Time spent with " patient: 80 minutes    Next CP visit scheduled: 7/1/21 (by phone)

## 2021-06-18 NOTE — PROGRESS NOTES
"Community Paramedic Program  Community Health Worker Follow Up    Intervention and Education during outreach:     Sent the following resources to pt's sister Sahil and pt's friend Katherine:    Medical Alert 's Guide: \"The Best Medical Alert Systems for 2021\"  https://www.medicalalertbuyersguide.org/best-medical-alert-systems/?mahualav=2702815224&qsxiezs=677725695061&keyword=how%20much%20is%20life%20alert&gclid=CjwKCAjw_[id number]      Life Alert  http://B[employer]/default.aspx?Id=LifeAlert&gclid=CjwKCAjw_[id number]      [facility]  Https://www.[employer].ImageBrief/pricing/#      [facility]  https://www.[facility].org/overarching-care/home-care-and-hospice/lifeline         [facility] Nurse Program  [email or web address]/   *see attached flyer  *they can help with bathing, grocery shopping, foot care and rides to medical appointments  *sliding scale fee based on Elizabeth padgett finances    [employer] [facility]  https://www.[employer].net/[employer]/services  *they can help with laundry, house cleaning, taking out trash/recycling, cooking, etc  *$37/hour, 2 hr minimum a month  *accepting new clients      Encouraged them to reach out to me with questions. Let them know I am on vacation next week but pt/they can reach out to  Jose if needed. Shared that I plan to follow up with pt in 2 weeks by phone. Thanked them for their time, help and all they are doing to support Elizabeth.    CHW Plan:  1. Pt's sister and friend will review and help pt connect with the resources that she is interested in.  2. Pt, pt's sister and pt's friend will contact the CP CHW with questions or for more/different resources before next outreach if needed.  3. CP CHW will follow up with pt by phone in 2 weeks and follow up on resources at that time.  "

## 2021-06-22 NOTE — PROGRESS NOTES
"Colon and Rectal Surgery Follow-up Clinic Note    RE: Elizabeth Taylor  : 1940  [place]: 2021    DIAGNOSIS: mT2N1 anal canal squamous cell carcinoma (s/p CXRT [5,400 cGy], completed on 3/23/18).    INTERVAL HISTORY: Denies increased pain, fevers, or chills. Tolerating diet well. Having 3-4 mushy BMs per day. No BPR. Does have persistent fecal urgency. Fecal incontinence is improving, mainly to liquid stool. Wears depends for this.      Physical Examination:  BP (!) 141/89 (BP Location: Left arm, Patient Position: Sitting, Cuff Size: Adult Small)   Pulse 74   Wt 107 lb   LMP  (LMP Unknown)   SpO2 99%   BMI 19.57 kg/m    Abdomen soft, NT. No inguinal adenopathy palpated.  Perianal skin with no excoriation. Stable hyper/hypopigmentation.  BRITTNEY: no lesions palpated.  Flexible sigmoidoscopy: after obtaining informed consent and performing a \"time out\", an adult flexible sigmoidoscope was introduced through the anus and passed up to the mid sigmoid colon. The quality of the prep was good. Findings: 2 cm left anterior flat white scar right at the dentate line with no evidence of recurrent neoplasia.  Mild post radiation proctitis.  No additional abnormalities were seen. Total scope time: 12 minutes. The patient tolerated the procedure well.    ASSESSMENT  Positive response to CXRT. No evidence of persistent or recurrent neoplasia.     2018 12:01 2020 09:24 2020 12:00   CEA 2.0 2.1 2.7 (H)       CT CAP 20:  IMPRESSION: No evidence of recurrent or residual disease in the chest,  abdomen, or pelvis.      3T MR pelvis 2021  IMPRESSION:   1. Continued complete treatment response without evidence of local  recurrence or metastasis in the pelvis.  2. Stable left adnexal cysts without worrisome features.    PLAN  1.  PET/CT in 2021.  2.  Colonoscopy and 3T MR pelvis in .  3.  Flex sig in .      30 minutes spent on the date of the encounter doing chart review, history and exam, " documentation and further activities as noted above.     Emir Rosas M.D., M.Sc.     Division of Colon and Rectal Surgery  [facility]    Referring Provider:  Felisha Davis, CHAYITO  [address] [place] [address]     CC:  MD Zaire Mcleod MD Melissa Geller, MD

## 2021-06-25 ENCOUNTER — ANCILLARY PROCEDURE (OUTPATIENT)
Dept: MRI IMAGING | Facility: CLINIC | Age: 81
End: 2021-06-25
Attending: COLON & RECTAL SURGERY
Payer: MEDICARE

## 2021-06-25 DIAGNOSIS — Z85.048 HISTORY OF ANAL CANCER: ICD-10-CM

## 2021-06-25 DIAGNOSIS — Z95.828 PORT-A-CATH IN PLACE: Primary | ICD-10-CM

## 2021-06-25 PROCEDURE — A9585 GADOBUTROL INJECTION: HCPCS | Performed by: RADIOLOGY

## 2021-06-25 PROCEDURE — 72197 MRI PELVIS W/O & W/DYE: CPT | Mod: MC | Performed by: RADIOLOGY

## 2021-06-25 RX ORDER — HEPARIN SODIUM (PORCINE) LOCK FLUSH IV SOLN 100 UNIT/ML 100 UNIT/ML
500 SOLUTION INTRAVENOUS ONCE
Status: DISCONTINUED | OUTPATIENT
Start: 2021-06-25 | End: 2023-07-17

## 2021-06-25 RX ORDER — GADOBUTROL 604.72 MG/ML
7.5 INJECTION INTRAVENOUS ONCE
Status: COMPLETED | OUTPATIENT
Start: 2021-06-25 | End: 2021-06-25

## 2021-06-25 RX ADMIN — GADOBUTROL 5 ML: 604.72 INJECTION INTRAVENOUS at 12:46

## 2021-06-25 NOTE — DISCHARGE INSTRUCTIONS
MRI Contrast Discharge Instructions    The IV contrast you received today will pass out of your body in your  urine. This will happen in the next 24 hours. You will not feel this process.  Your urine will not change color.    Drink at least 4 extra glasses of water or juice today (unless your doctor  has restricted your fluids). This reduces the stress on your kidneys.  You may take your regular medicines.    If you are on dialysis: It is best to have dialysis today.    If you have a reaction: Most reactions happen right away. If you have  any new symptoms after leaving the hospital (such as hives or swelling),  call your hospital at the correct number below. Or call your family doctor.  If you have breathing distress or wheezing, call 911.    Special instructions: ***    I have read and understand the above information.    Signature:______________________________________ Date:___________    Staff:__________________________________________ Date:___________     Time:__________    [facility] Departments:    ___[place]: [phone]  ___[facility]: [phone]  ___[place]: [phone] ___[facility]: [phone]  ___[facility]: [phone]  ___[facility]: [phone]  ___[phone]  ___[facility]: [phone]  ___[phone]

## 2021-06-28 ENCOUNTER — ONCOLOGY VISIT (OUTPATIENT)
Dept: ONCOLOGY | Facility: CLINIC | Age: 81
End: 2021-06-28
Attending: INTERNAL MEDICINE
Payer: MEDICARE

## 2021-06-28 ENCOUNTER — OFFICE VISIT (OUTPATIENT)
Dept: SURGERY | Facility: CLINIC | Age: 81
End: 2021-06-28
Payer: MEDICARE

## 2021-06-28 VITALS
HEART RATE: 74 BPM | BODY MASS INDEX: 19.57 KG/M2 | DIASTOLIC BLOOD PRESSURE: 89 MMHG | OXYGEN SATURATION: 99 % | WEIGHT: 107 LBS | SYSTOLIC BLOOD PRESSURE: 141 MMHG | RESPIRATION RATE: 16 BRPM

## 2021-06-28 VITALS
BODY MASS INDEX: 19.69 KG/M2 | OXYGEN SATURATION: 99 % | SYSTOLIC BLOOD PRESSURE: 141 MMHG | WEIGHT: 107 LBS | DIASTOLIC BLOOD PRESSURE: 89 MMHG | HEART RATE: 74 BPM | HEIGHT: 62 IN

## 2021-06-28 DIAGNOSIS — C21.1 MALIGNANT NEOPLASM OF ANAL CANAL (H): Primary | ICD-10-CM

## 2021-06-28 DIAGNOSIS — Z85.048 HISTORY OF ANAL CANCER: Primary | ICD-10-CM

## 2021-06-28 PROCEDURE — 45330 DIAGNOSTIC SIGMOIDOSCOPY: CPT | Performed by: COLON & RECTAL SURGERY

## 2021-06-28 PROCEDURE — 99214 OFFICE O/P EST MOD 30 MIN: CPT | Performed by: INTERNAL MEDICINE

## 2021-06-28 ASSESSMENT — PAIN SCALES - GENERAL
PAINLEVEL: NO PAIN (0)
PAINLEVEL: NO PAIN (0)

## 2021-06-28 ASSESSMENT — MIFFLIN-ST. JEOR: SCORE: 903.6

## 2021-06-28 NOTE — PROGRESS NOTES
MARGARET > 3 years  Ok to discharge from clinic  GynOnc referal    Reason for Visit: Follow up of anal cancer     History of Present Illness: Elizabeth Taylor is a 80 year old female with a history of SCC of the anal canal, stage T2N1 IIIA. Her oncologic history is as follows:     Developed bright red blood per rectum started about 4 years ago and had a 1.5 cm anal lesion on the left side along with left groin palpable adenopathy. Biopsy of anal mass came back positive for squamous cell carcinoma. Patient underwent staging workup with MRI pelvis and a PET scan- showed PET avid left anal canal mass along with small left inguinal FDG avid lymph nodes.      02/12/18- Started on concurrent chemoradiation with 5FU/Mitomycin     03/13/18 Skipped Mitomycin and proceeded with 5FU at reduced dose given neutropenia/old age     03/19/18 -04/04/18 admitted to the [facility] [place] with intractable diarrhea, nausea, generalized weakness and fall at home resulting in multiple right rib fractures. Hospital course was complicated by small bowel obstruction for which patient was started on TPN. Bowel obstruction was managed conservatively eventually improved prior to discharge . 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 She was eventually discharged to transitional care unit.     05/25/18 05/25/18 MRI pelvis and PET scan done showed near complete resolution of patient's known anal lesion with no evidence for recurrence or metastatic disease.     07/25/18-07/30/18 Admitted to the hospital with traumatic right femoral neck fracture secondary to fall. She underwent right hemiarthroplasty and was discharged to transitional care facility. Also noted to have a C. difficile infection and was started on oral vancomycin course.     11/1/18-11/5/18 admitted to hospital with a fall and left hip pain found to have pelvic ring fracture. Treated conservatively with pain control, PT/OT, and  "recommendations for PCP follow-up with DEXA scan and osteoporosis work-up.      She returns today for surveillance for recurrence. She is having more normal bowel movements now and has better control. No abdominal pain or blood in stools.  -Eating and drinking well, weight stable.   -She denies fevers, respiratory concerns, nausea/vomiting  --concerned about \"hardness\" in her vulva     On exam she appears her stated age and chronically ill      Data:     MRI pelvis 6/25/21: reviewed by me shows no evidence of recurrence and a stable benign appearing adnexal cyst.     Flex sig and physical exam today by Dr Rosas- no evidence of recurrent neoplasia     Assessment and Plan:  1. Onc  Anal cancer s/p chemoradiation completed 2018 with no signs of disease recurrence. She is > 3 years out with no evidence of disease and we can discontinue routine surveillance for recurrence. I discussed symptoms that need to be brought to attention that might suggest recurrence, rectal bleeding, pain, new masses, unexplained weight loss or fatigue. I will follow up with her on a PRN basis.     Seen by Dr Rosas recently who plans to follow with  1.  3T MR pelvis and Flex Sig in 6/2021.  2.  PET/CT in 8/2021.  4.  Colonoscopy in 2022.        2. Gyn  Adnexal mass stable, has seen gyn onc who felt this is likely benign and doesn't require follow up beyond the imaging she will get for her anal cancer. The changes in her vulva are expected post radiation. She can follow up with gyn/onc if she would like further evaluation.          "

## 2021-06-28 NOTE — NURSING NOTE
"Chief Complaint   Patient presents with     Flexible Sigmoidoscopy     History of anal cancer       Vitals:    06/28/21 1353   BP: (!) 141/89   BP Location: Left arm   Patient Position: Sitting   Cuff Size: Adult Small   Pulse: 74   SpO2: 99%   Weight: 107 lb   Height: 5' 2\"       Body mass index is 19.57 kg/m .          Sherrie Suggs CMA      "

## 2021-06-28 NOTE — LETTER
"2021       RE: Elizabeth Taylor  [address]  [place] [address]     Dear Colleague,    Thank you for referring your patient, Elizabeth Taylor, to the [facility] COLON AND RECTAL SURGERY CLINIC [place] at [facility]. Please see a copy of my visit note below.    Colon and Rectal Surgery Follow-up Clinic Note    RE: Elizabeth Taylor  : 1940  [place]: 2021    DIAGNOSIS: mT2N1 anal canal squamous cell carcinoma (s/p CXRT [5,400 cGy], completed on 3/23/18).    INTERVAL HISTORY: Denies increased pain, fevers, or chills. Tolerating diet well. Having 3-4 mushy BMs per day. No BPR. Does have persistent fecal urgency. Fecal incontinence is improving, mainly to liquid stool. Wears depends for this.      Physical Examination:  BP (!) 141/89 (BP Location: Left arm, Patient Position: Sitting, Cuff Size: Adult Small)   Pulse 74   Wt 107 lb   LMP  (LMP Unknown)   SpO2 99%   BMI 19.57 kg/m    Abdomen soft, NT. No inguinal adenopathy palpated.  Perianal skin with no excoriation. Stable hyper/hypopigmentation.  BRITTNEY: no lesions palpated.  Flexible sigmoidoscopy: after obtaining informed consent and performing a \"time out\", an adult flexible sigmoidoscope was introduced through the anus and passed up to the mid sigmoid colon. The quality of the prep was good. Findings: 2 cm left anterior flat white scar right at the dentate line with no evidence of recurrent neoplasia.  Mild post radiation proctitis.  No additional abnormalities were seen. Total scope time: 12 minutes. The patient tolerated the procedure well.    ASSESSMENT  Positive response to CXRT. No evidence of persistent or recurrent neoplasia.     2018 12:01 2020 09:24 2020 12:00   CEA 2.0 2.1 2.7 (H)       CT CAP 20:  IMPRESSION: No evidence of recurrent or residual disease in the chest,  abdomen, or pelvis.      3T MR pelvis 2021  IMPRESSION:   1. " Continued complete treatment response without evidence of local  recurrence or metastasis in the pelvis.  2. Stable left adnexal cysts without worrisome features.    PLAN  1.  PET/CT in 8/2021.  2.  Colonoscopy and 3T MR pelvis in 2022.  3.  Flex sig in 2023.      30 minutes spent on the date of the encounter doing chart review, history and exam, documentation and further activities as noted above.     Emir Rosas M.D., M.Sc.     Division of Colon and Rectal Surgery  [facility]    Referring Provider:  Felisha Davis, CHAYITO  [address] [place] [address]     CC:  MD Zaire Mcleod MD Melissa Geller, MD          Again, thank you for allowing me to participate in the care of your patient.      Sincerely,    Emir Rosas MD

## 2021-06-28 NOTE — Clinical Note
6/28/2021         RE: Elizabeth Taylor  [address]  [place] [address]        Dear Colleague,    Thank you for referring your patient, Elizabeth Taylor, to the [facility]CER [facility]. Please see a copy of my visit note below.    MARGARET > 3 years  Ok to discharge from clinic  GynOnc referal      Again, thank you for allowing me to participate in the care of your patient.        Sincerely,        David Jones MD

## 2021-06-28 NOTE — PATIENT INSTRUCTIONS
Follow up:    1. Colonoscopy and MRI 6/2022. We will call you sooner to the time you are due.     2. PET scan due in November     Radiology Appointments [phone]      MADELEINE Mock [phone]

## 2021-07-01 ENCOUNTER — PATIENT OUTREACH (OUTPATIENT)
Dept: OTHER | Facility: CLINIC | Age: 81
End: 2021-07-01

## 2021-07-01 NOTE — PROGRESS NOTES
"Community Paramedic Program  Community Health Worker Follow Up    Intervention and Education during outreach:     Called pt's sister Katherine. I let her know that I spoke with Elizabeth and wanted to check in regarding the resources I shared at our last visit. She said \"I did have the chance to look at them.\" She said they hadn't \"made any calls yet but I know Elizabeth was really interested in the block nurse program in her neighborhood.\" Sahil said that \"Elizabeth has well more than $3,000 in her savings, which she would need to spend down in order to get Medicaid.\" Sahil did not share how much pt has in her savings, but did mention \"that has been something that has stopped us in the past.\" I told Sahil that I am working on setting up another visit with Elizabeth next week and would be happy to call Shayy Bailey, Director of the [facility], with pt present to get the process started. I asked Sahil if she would be willing to provide more detailed financial information if needed to Shayy, and she said yes. I let Sahil know that I will follow up with her to request this and keep her updated on next steps in the process. She agreed. Updated goal.    I also asked if Sahil and her , Hugo, had reviewed the falls alert systems/devices. She said \"we did look into those but haven't purchased anything yet.\" She said that they were \"debating whether or not Elizabeth needed to pay $20 a month for something she may not really need.\" I asked Sahil if she would clarify, and she said \"well Elizabeth seems to have more 'roll downs' than falls since her last one in April, according to what she has shared with me.\" I let Sahil know about pt's comment to me during our conversation today about losing her balance and spilling a cup of coffee and encouraged Sahil to take that into consideration. Sahil agreed and asked if I noticed that pt was \"having a hard time remembering when things happen.\" I confirmed that yes, I've noticed this in my " conversations and meetings with pt. Updated goal.    I asked if Sahil had any questions or needed anything from me. She declined. She thanked me for reaching out and following up. I let her know that I will be in touch in the next week or so with an update and to request more details about pt's financial information if needed for the [facility] Nurse Program.    CHW Plan:  1. CP CHW will call the [facility] Nurse Program with pt next week during our visit to start the referral process.  2. CP CHW will follow up with pt's sister Sahil to request financial information, if needed, for the [facility] Nurse Program.  3. Pt's sister will contact the CP CHW with questions, updates or to request resources.

## 2021-07-01 NOTE — PROGRESS NOTES
"Community Paramedic Program  Community Health Worker Follow Up    Intervention and Education during outreach:     Called pt to check in. She said \"I was just thinking about you, and I'm so glad you called.\" Pt shared that she had been \"feeling exhausted \" from her appointments on Monday but \"I got a lot of rest and woke up today feeling refreshed and ready to go.\" I asked if pt would share with me how the visits went, and she said \"this is my third year being cancer free and that is reason to celebrate.\" Pt said she was thrilled by the news and is working on writing Dr. Jones a thank you card. According to pt, \"I saw him each visit for the past three years, and I so appreciated the care I have received from him.\" Thanked pt for the update and let her know how happy I am to hear this news.     I asked how pt has been doing since the last time we met at her apartment with the CP and pt's sister Sahil. She said \"I've been doing pretty well overall.\" Pt said she is trying to \"implement a new routine and get up earlier in the morning than I have been.\" Pt also said she \"almost fell yesterday and dropped a cup of coffee on the ground.\" She said she was in her kitchen and was moving into her living room when \"I lost my balance.\" Pt said she felt like \"my mind wasn't quite awake.\" She talked about the daily exercises and stretches she's been doing, specifically to help \"my spine and my flexibility.\"     Referenced the resources that I shared with pt and her sister Sahil at our last visit. I asked Elizabeth if she and Sahil had contacted any of them yet, and pt said \"I'm not sure.\" Pt said she knows that \"Sahil was working on my finances and seeing how much I might have to spend from my budget\" but does not recall any further conversations after that. I asked if pt would be comfortable with me reaching out to Sahil and Katherine to check in regarding the resources, and she agreed. I let her know that I plan to follow up after our call " "today, which pt agreed to and thanked me for doing.    Offered to set up another visit with pt next week, and she agreed. I asked if pt was available on Thursday in the late morning or early afternoon. She said that Katherine's mother in law is coming into town next week and they are all \"planning to go out for [employer].\" Pt could not remember the day or time but \"something is telling me Katherine was talking about Thursday.\" I told pt that I would be happy to check in with Katherine about this when we connect.     I let pt know once Katherine and I connect, I will call her back to confirm our visit date and time. She agreed and said \"I want to give you a special thank you for the gift of listening and for being there.\" I thanked her and let her know I am looking forward to seeing her next week.    CHW Plan:  1. CP CHW will contact pt's friend Katherine to see when pt is busy next week.  2. CP CHW will call pt to confirm 7/8 co-visit at 1 pm at pt's apartment in [place].  3. CP CHW will follow up with pt's sister Sahil regarding resources shared at 6/17 visit.  4. Pt will call the CP CHW with questions, updates or to request resources if needed.    "

## 2021-07-06 NOTE — PROGRESS NOTES
Community Paramedic Program Contact  [facility]/Voicemail    CP Community Health Worker Outreach  Outreach attempted x 1.  Left message on patient's voicemail with call back information and requested return call.  Plan: Community Health Worker will try to reach patient again in 1-2 business days.    Left detailed msg for pt. Let her know that Katherine is coming to get her tomorrow (7/7) at 10:30 am. Asked if CP Jose and I can still come to visit pt on Thursday (7/8) at 1 pm. Left my number and encouraged pt to call me back with questions, to confirm or to reschedule.

## 2021-07-07 NOTE — PROGRESS NOTES
Community Paramedic Program  Community Health Worker Follow Up    Intervention and Education during outreach:     Received messages from both pt and pt's friend Katherine confirming pt's availability for a co-visit with CP Jose and myself on Thursday, July 8th at 1 pm.     CHW Plan:  1. Pt will visit with the CP and CP CHW at her apartment on Thursday, July 8th at 1 pm.  2. Pt will contact the CP CHW with questions, updates or to reschedule if needed.  3. CP CHW will send pt's friend Katherine and pt's sister Sahil an email summarizing our visit by the end of the week.

## 2021-07-08 ENCOUNTER — ALLIED HEALTH/NURSE VISIT (OUTPATIENT)
Dept: OTHER | Facility: CLINIC | Age: 81
End: 2021-07-08
Payer: MEDICARE
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
## 2021-07-08 DIAGNOSIS — R41.3 MEMORY CHANGES: ICD-10-CM

## 2021-07-08 PROCEDURE — 99207 PR COMMUNITY PARAMEDIC - PATIENT NOT BILLABLE: CPT

## 2021-07-08 ASSESSMENT — ACTIVITIES OF DAILY LIVING (ADL): DEPENDENT_IADLS:: LAUNDRY;SHOPPING;MEAL PREPARATION;TRANSPORTATION

## 2021-07-09 VITALS
DIASTOLIC BLOOD PRESSURE: 68 MMHG | TEMPERATURE: 98.1 F | OXYGEN SATURATION: 98 % | RESPIRATION RATE: 14 BRPM | SYSTOLIC BLOOD PRESSURE: 108 MMHG | HEART RATE: 80 BPM

## 2021-07-09 NOTE — PROGRESS NOTES
"Community Paramedics Follow Up Visit  July 8, 2021 TIME:1:00 pm    Elizabeth Taylor is a 80 year old female being seen at home for a Community Paramedic Home visit.    Present at appointment: Pt, Pt's sister Sahil, Community Paramedic (CP) Jose Schroeder, CAM Community Health Worker (CHW) Gardenia Luo    Resources and Interventions:    Met with pt at her apartment. Shortly after CAM Garcia and I arrived, pt's sister buzzed to be let into the building. Elizabeth said \"I don't know if I mentioned but I asked Sahil to come today so we could talk more about my finances.\" Thanked her for sharing and greeted pt's sister when she entered pt's apartment.    Checked in with pt and asked if she'd had the opportunity to connect with some of the resources we discussed at our last visit. She said that \"I talked with a wonderful nurse named Nataliia from the [facility], and they are going to come out and see me next week.\" Pt said when she was at her friend Katherine's home earlier in the week, \"Katherine encouraged me to call them and start the process, especially since she knows how much I would like help with taking baths.\" According to pt, Nataliia will be \"calling me to set up a time to visit with me here.\" I let pt know how happy I was to hear this update and asked if she would please keep me posted on how things move forward. Pt said \"I told them that they can get information from you about me if they need it.\" Updated goal.    Asked pt's sister if she and her  Hugo were able to look more into the fall alert system options I'd provided. Sahil said that \"we've been talking about it\" but that they haven't \"landed on which one will be the best for Elizabeth yet.\" Pt again confirmed that she would still like to purchase one because \"I am terrified of falling again and getting hurt.\" Updated goal.    Pt talked about her upcoming birthday next week and said that \"I'm feeling really good about this one.\" She said she's \"still " "celebrating the news that I'm cancer free\" and that she's been thinking more about the future recently. Pt shared that she has \"started a list in my mind of who I want to have my prized possessions, and I would like to start giving them out while I'm still around.\" She added that \"I have a lot of nice things that are important to me, but I have too much and I would like to start clearing out some room.\" I thanked pt for sharing this and asked if she has started writing down the list of which things she would like to go to, and she said no. I asked pt and her sister if pt has a will in place, and Sahil said she wasn't sure. Sahil said she can check at home \"in my Elizabeth file\" to see if one has been created and notarized. Sahil said \"the only thing I remember us starting was a healthcare directive in 2018 when Elizabeth was in the nursing home after her cancer treatment.\" She said that they \"definitely started it but I don't know if it was ever finalized because we had so much going on at the time.\" Pt added \"I think I might have a copy of the paperwork somewhere around here.\" I offered my support to help pt complete one, if she is interested, and she said yes. Pt said she would \"prefer if it was more conversational, because that will allow my thoughts to flow freely.\" I agreed. Offered to bring over talking points to guide the conversation and at least get pt to start thinking about her wishes and preferences in different scenarios. She said \"that sounds like a great plan.\" Created new goal.     Asked pt if she would like any resources to help her go through her belongings and start deciding what she wants to keep, sell or donate. Pt declined at this time but said \"will you ask me that again in a few weeks?\" I agreed. Pt said she would like to focus on only a few things at a time. Noted this and plan to revisit this in the future with pt.    Pt's sister said she needed to leave, and pt asked if we would be willing to help " "her get down the stairs and outside. Pt said \"I would really like to get some fresh air and get out of the apartment for a minute.\" We agreed. Pt's sister escorted pt down the stairs, and we walked to [address] together, which is about 300 feet from the front door of pt's apartment building, and back. Pt was \"thrilled to be out\" and repeatedly thanked me and her sister for taking the time to \"get me up and moving.\" Pt took about 10 to 15 steps at a time, would ask to stop, and then would lift her head and attempt to straighten out her back. She said \"this really helps me stretch my back out and get a little bit of relief.\"     We assisted pt back into her apartment. I asked her if I could give her a call early next week to check in and then set up another visit. She agreed. 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 We couldn't do one last year because of the Coronavirus. I can't wait!\"    HEALTH CARE GOALS:  Goals Addressed as of 7/9/2021 at 8:15 AM                 6/17/21       Patient Stated      Medical (pt-stated)       Added 7/9/21 by Felisha Luo     Goal Statement: I want to complete a healthcare directive in the next two months.  Date Goal set: 7/8/21  Barriers: none  Strengths: family support, CC & CP support, cancer free  Date to Achieve By: 9/8/21  Patient expressed understanding of goal: yes    Action steps to achieve this goal:  1. I will ask the CP CHW to bring information to guide a conversation about my wishes and preferences to our next visit.  2. I will see if I can find the healthcare directive I started when I was at the [facility] in 2018.  3. I will continue speaking with my family and friends about this topic.          Reducing Risks (pt-stated)   30%    Added 6/14/21 by Felisha Luo     Goal Statement: In the next " three months, I want to connect with resources and supports to help keep me safe at home.  Date Goal set: 6/11/21  Barriers: finances  Strengths: support system, CP and CC support, open to making changes  Date to Achieve By: 9/11/21  Patient expressed understanding of goal: yes    Action steps to achieve this goal:  1. I will meet with Nataliia from the [facility] Nurse Program next week at my apartment.  2. I will ask my sister Sahil and her  Hugo to help me understand what I can pay for services at home. (in process)  3. With my sister and brother-in-law's help, I will review fall alert system options that the CP CHW shared, choose the best fit for me and purchase one in the next two weeks. (in process)  4. I will let the CP CHW know when I would like to sign up for [employer]'s phone  program.    Updated: 7/9/21          CHW Plan:  1. CP CHW will bring information to guide a conversation about pt's care preferences and wishes to the next visit with pt.  2. CP CHW will follow up with pt by phone on July 14th and will schedule another in-person visit.  3. Pt will schedule an in-home assessment with the [facility] Nurse Program in the next week.  4. Pt will continue working with her sister and her brother-in-law to choose and buy a fall alert system that meets pt's needs and budget.  5. Pt will contact the CP CHW will questions, updates or for resources if needed before next outreach.    Time spent with patient: 180 minutes    Next CP visit scheduled: July 14th (by phone)

## 2021-07-12 ENCOUNTER — PATIENT OUTREACH (OUTPATIENT)
Dept: OTHER | Facility: CLINIC | Age: 81
End: 2021-07-12

## 2021-07-12 NOTE — PROGRESS NOTES
"Community Paramedic Program  CHW resource follow up    Received a message from Shayy Bailey, the director of the [facility] Nurse Program. I returned her call and she thanked me for getting back to her. She let me know that pt reached out and wants to start services. Shayy said one of their nurses (Jessica) will be calling Elizabeth to schedule a visit so she can assess the pt's home and situation. Shayy said that before calling me earlier this morning, she called pt, who gave Shayy verbal permission to call and speak with me about her and her care. Shayy said \"we mailed out a consent form for Elizabeth to sign\" but they haven't received it back yet.     Shayy asked me if I could share more information about what I am working on, my role and pt's situation. I briefly summarized the priorities that pt shared with me (bathing, laundry and fall alert system) and the new goal pt and I created at our last visit. I also let Shayy know that pt expressed an interest in resources to help her declutter her apartment. I told Shayy that I've been following up weekly with pt (via phone or in-person visits) and briefly described what CAM Garcia's role entails with pt. Shayy thanked me for clarifying and said \"I just wanted to make sure that we aren't overwhelming Elizabeth with too many people in and out of her place or too much information.\" Confirmed my phone number and email address with Shayy, which she said \"I will keep on file and plan to keep you posted.\" She asked me for pt's PCP and clinic information, which I provided to her.     For next steps, Shayy said Jessica (nurse) will schedule an in-person visit with Elizabeth. Shayy said their Home Health Aide (HHA) schedules are \"booked\" but she thought that they might be able to arrange something so pt could get a bath soon. She said the nurse and HHA would visit pt together and both be present during pt's bath. She said that \"once Elizabeth is in the bath,\" the HHA could \"get a load of laundry " "going\" and asked me if pt has access to laundry in her building. I confirmed that yes, she does in the basement but let Shayy know I haven't seen it or been down there. She said there may be a sliding scale fee for their services, and I let Shayy know I shared that with pt and her sister Sahil at one of our visits. She thanked me for the time and information and said that she is \"looking forward to working together to support Elizabeth.\"  "

## 2021-07-14 ENCOUNTER — PATIENT OUTREACH (OUTPATIENT)
Dept: OTHER | Facility: CLINIC | Age: 81
End: 2021-07-14

## 2021-07-14 NOTE — PROGRESS NOTES
"Community Paramedic Program  Community Health Worker Follow Up    Intervention and Education during outreach:     Called pt to follow up. Wished her a very happy birthday and asked what she is doing today to celebrate. 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 Pt said \"I feel like a onofre and I am so grateful.\" Told pt that she deserves to be surrounded by loved ones and get some pampering on her birthday.     I let pt know that I spoke with Shayy Bailey from the [facility] Nurse Program last week. Pt said \"Shayy called me to ask if she could contact you, so I am glad to hear that you talked.\" I briefly summarized my discussion with Shayy. Pt said \"Shayy and I talked about the fee for their services, which seems very reasonable.\" I asked pt to please let me know how things go and that I'd like to hear about her experience. She agreed. I asked if Shayy talked with pt yet about getting a bath, and she said that \"we talked about it and Shayy said she's working on it. It should be soon.\" Pt expressed excitement about this and said \"now I just need to figure out how to get my bathtub cleaned because it has been a long time since I've used it.\" She said she plans to ask Katherine for some help lilo before their Zoom call. Updated goal.    Asked if pt would like to set up another time for me to come visit, and she agreed. I asked if pt would be available on Monday, July 19th, and she " "said \"I think so but I will need to check.\" Pt said \"with all the activity this week I have let my table where I keep all my paperwork and appointment information go, so it's pretty unorganized.\" I let her know that she is welcome to call me back tomorrow and confirm her availability and what time she would like me to come by. She said \"thank you, I will do that.\" Let her know I am looking forward to seeing her again and told her to enjoy her day before ending our call.     CHW Plan:  1. Pt will visit with a [facility] Nurse Program RN today at her apartment for foot care.  2. Pt will contact the  CHW in the next 1-2 business days to confirm availability and time for an in-person visit.    "

## 2021-07-18 ENCOUNTER — OFFICE VISIT (OUTPATIENT)
Dept: URGENT CARE | Facility: URGENT CARE | Age: 81
End: 2021-07-18
Payer: MEDICARE

## 2021-07-18 ENCOUNTER — ANCILLARY PROCEDURE (OUTPATIENT)
Dept: GENERAL RADIOLOGY | Facility: CLINIC | Age: 81
End: 2021-07-18
Attending: NURSE PRACTITIONER
Payer: MEDICARE

## 2021-07-18 VITALS
WEIGHT: 107 LBS | SYSTOLIC BLOOD PRESSURE: 124 MMHG | BODY MASS INDEX: 19.69 KG/M2 | DIASTOLIC BLOOD PRESSURE: 66 MMHG | HEIGHT: 62 IN | HEART RATE: 84 BPM

## 2021-07-18 DIAGNOSIS — S79.911A INJURY OF RIGHT HIP, INITIAL ENCOUNTER: Primary | ICD-10-CM

## 2021-07-18 DIAGNOSIS — M25.551 HIP PAIN, RIGHT: Primary | ICD-10-CM

## 2021-07-18 DIAGNOSIS — S79.911A INJURY OF RIGHT HIP, INITIAL ENCOUNTER: ICD-10-CM

## 2021-07-18 PROCEDURE — 99214 OFFICE O/P EST MOD 30 MIN: CPT | Performed by: NURSE PRACTITIONER

## 2021-07-18 PROCEDURE — 73502 X-RAY EXAM HIP UNI 2-3 VIEWS: CPT | Mod: RT | Performed by: RADIOLOGY

## 2021-07-18 ASSESSMENT — MIFFLIN-ST. JEOR: SCORE: 898.6

## 2021-07-18 NOTE — PATIENT INSTRUCTIONS
No obvious fracture on xray, good alignment of hip replacement  Radiologist saw lucency and reccomends CT, ortho referral placed  Likely a muscle strain, spasms  Rest, ice, heat, stretch  Tylenol and lidoderm patches  ER if severe worsening pain, headache, vision change, confusion, cannot walk, dizziness, excessive sleeping, confusion

## 2021-07-18 NOTE — PROGRESS NOTES
Chief Complaint   Patient presents with     Urgent Care     Musculoskeletal Problem     right hip pain and thigh pain since 3 am. Got up and was standing at sink and then she fell backwards.     SUBJECTIVE:  Elizabeth Taylor is a 81 year old female who presents to the clinic today with fell at 0300 and now having mid right thigh pain when bearing weight, partial hip replacement. She has radiation to that area in 2018. She is confident she did not hit her head, denies any neuro signs or blood thinners. It is possible that she was dehydrated and just has poor balance. She was standing in the kitchen in the dark at 0300 when she slowly fell backwards.    Past Medical History:   Diagnosis Date     Anal cancer (H)      Endometriosis, site unspecified      Thyroiditis, unspecified     Thryoiditis-resolved     acetaminophen (TYLENOL) 500 MG tablet, Take 1,000 mg by mouth 3 times daily  Calcium-Magnesium-Zinc 333-133-5 MG TABS per tablet, Take 1 tablet by mouth daily  cholecalciferol (VITAMIN D3) 5000 units (125 mcg) capsule, 1 CAP BY MOUTH DAILY (DX: OSTEOPOROSIS)  gabapentin (NEURONTIN) 100 MG capsule, TAKE ONE TO FOUR CAPSULES AT BEDTIME AS NEEDED FOR MUSCLE SPASMS  vitamin B complex with vitamin C (VITAMIN  B COMPLEX) TABS tablet, Take 1 tablet by mouth daily  VITAMIN E PO, Take 1 capsule by mouth daily  conjugated estrogens (PREMARIN) 0.625 MG/GM vaginal cream, Apply locally to clitoral randall pea sized amount daily for 2 weeks then prn. (Patient not taking: Reported on 6/28/2021)  docusate sodium (COLACE) 100 MG capsule, Take 1 capsule (100 mg) by mouth 2 times daily (Patient not taking: Reported on 6/28/2021)  estradiol (ESTRACE) 0.1 MG/GM vaginal cream, Apply locally to clitoral randall pea sized amount daily for 2 weeks then prn. (Patient not taking: Reported on 6/28/2021)  metroNIDAZOLE (METROGEL) 0.75 % external gel, Apply topically 2 times daily    heparin 100 UNIT/ML injection 500 Units      Social History  "    Tobacco Use     Smoking status: Never Smoker     Smokeless tobacco: Never Used   Substance Use Topics     Alcohol use: No     Allergies   Allergen Reactions     Darvon [Propoxyphene]      Had reaction in teen years     Ketoconazole Rash     Penicillins      As a child     Review of Systems   Constitutional: Negative for activity change, appetite change, chills, diaphoresis, fatigue and fever.   Cardiovascular: Negative for chest pain, palpitations and peripheral edema.   Gastrointestinal: Negative for abdominal pain, nausea and vomiting.   Musculoskeletal: Positive for arthralgias, gait problem and myalgias.   Skin: Negative for color change, rash and wound.   Neurological: Negative for dizziness, weakness, light-headedness, numbness, headaches and paresthesias.   Psychiatric/Behavioral: Negative for sleep disturbance.     EXAM:   /66   Pulse 84   Ht 1.575 m (5' 2\")   Wt 48.5 kg (107 lb)   LMP  (LMP Unknown)   BMI 19.57 kg/m      Physical Exam  Vitals reviewed.   Constitutional:       Appearance: Normal appearance.   HENT:      Head: Normocephalic and atraumatic.   Cardiovascular:      Rate and Rhythm: Normal rate.   Pulmonary:      Effort: Pulmonary effort is normal.   Musculoskeletal:         General: Tenderness present. No swelling or deformity. Normal range of motion.   Skin:     General: Skin is warm and dry.      Findings: No bruising, erythema or rash.   Neurological:      General: No focal deficit present.      Mental Status: She is alert and oriented to person, place, and time.      Cranial Nerves: No cranial nerve deficit.      Sensory: No sensory deficit.      Motor: Weakness present.      Coordination: Coordination normal.      Gait: Gait abnormal (baseline wheelchair bound).   Psychiatric:         Mood and Affect: Mood normal.         Behavior: Behavior normal.       Xray done in clinic read by me as negative for fracture, positive for hip arthroplasty.  Results for orders placed or " performed in visit on 07/18/21   XR Hip Right 2-3 Views     Status: None    Narrative    XR RIGHT HIP TWO-THREE VIEWS  7/18/2021 1:45 PM     INDICATION: Right-sided hip pain after a fall.   COMPARISON: 7/9/2019.      Impression    IMPRESSION:  1.  Findings suspicious for nondisplaced right femur greater  trochanter periprosthetic fracture. There is a suggestion of a  transverse lucency in this region on the frontal view and oblique  lucency in this region on the frog leg lateral view. No additional  areas suspicious for fracture. This could be further evaluated with CT  if clinically indicated.  2.  Right hip hemiarthroplasty with proximal femur and a prosthesis.  The component is well seated. No evidence of loosening or subsidence.  3.  Normal joint alignment.       LANDON MCKNIGHT MD         SYSTEM ID:  [id number]     ASSESSMENT:    ICD-10-CM    1. Injury of right hip, initial encounter  S79.911A XR Hip Right 2-3 Views     PLAN:  Patient Instructions   No obvious fracture on xray, good alignment of hip replacement  Radiologist saw lucency and reccomends CT, ortho referral placed  Likely a muscle strain, spasms  Rest, ice, heat, stretch  Tylenol and lidoderm patches  ER if severe worsening pain, headache, vision change, confusion, cannot walk, dizziness, excessive sleeping, confusion    Follow up with primary care provider with any problems, questions or concerns or if symptoms worsen or fail to improve. Patient agreed to plan and verbalized understanding.    MARIAH [facility]

## 2021-07-19 ENCOUNTER — PATIENT OUTREACH (OUTPATIENT)
Dept: OTHER | Facility: CLINIC | Age: 81
End: 2021-07-19

## 2021-07-19 ENCOUNTER — TELEPHONE (OUTPATIENT)
Dept: ORTHOPEDICS | Facility: CLINIC | Age: 81
End: 2021-07-19

## 2021-07-19 ENCOUNTER — ALLIED HEALTH/NURSE VISIT (OUTPATIENT)
Dept: OTHER | Facility: CLINIC | Age: 81
End: 2021-07-19
Payer: MEDICARE
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
## 2021-07-19 DIAGNOSIS — R41.3 MEMORY CHANGES: ICD-10-CM

## 2021-07-19 PROCEDURE — 99207 PR COMMUNITY PARAMEDIC - PATIENT NOT BILLABLE: CPT

## 2021-07-19 ASSESSMENT — ENCOUNTER SYMPTOMS
MYALGIAS: 1
LIGHT-HEADEDNESS: 0
FATIGUE: 0
APPETITE CHANGE: 0
PALPITATIONS: 0
WEAKNESS: 0
ABDOMINAL PAIN: 0
PARESTHESIAS: 0
FEVER: 0
DIAPHORESIS: 0
COLOR CHANGE: 0
SLEEP DISTURBANCE: 0
WOUND: 0
CHILLS: 0
NUMBNESS: 0
DIZZINESS: 0
VOMITING: 0
HEADACHES: 0
ACTIVITY CHANGE: 0
NAUSEA: 0
ARTHRALGIAS: 1

## 2021-07-19 NOTE — PROGRESS NOTES
"Community Paramedics Follow Up Visit  July 19, 2021 TIME:3:00 pm    Elizabeth Talyor is a 81 year old female being seen at home for a Community Paramedic Home visit.    Present at appointment: Pt, Community Paramedic (CP) Community Health Worker (CHW) Gardenia Luo    Resources and Interventions:    Met with pt at her apartment in [place]. When I opened the door, pt was upright and holding onto a nearby bookshelf with both hands. She was partially dressed and said \"I'm so sorry, I was in the bathroom and it took me fifteen minutes to get to the phone. Someone called from the clinic right before you got here.\" I asked pt if I could assist her to sit down in her chair in the living room. She said yes. Took a hold of pt's arms and she showed me how she \"side steps\" to move around since her fall yesterday. As I helped pt slowly sit down in her chair, I noticed that she was wincing. I asked pt if she was feeling pain, and she said \"it's okay, it's not too bad.\" Noted that there were books, papers and bags scattered throughout pt's living room and in her walk ways. I asked pt's permission to move these items to clear the walk ways, and pt agreed. Pt said \"with everything that's been going on, my apartment reflects it. I'm feeling like I've been in a whirlwind.\"     After clearing pt's walk ways, I asked if she would share with me what happened yesterday before she fell. Pt said she got up around 3 am to use the bathroom and then \"I went in the kitchen to take a small bite of food, and suddenly, I fell backwards and ended up laying on the floor.\" According to pt, she laid there for about an hour because \"I couldn't get up, it hurt so bad.\" Pt said she finally managed to crawl from the kitchen into her living room to get the phone. Pt said \"I called Katherine and she came to get me.\" Pt said she went to Urgent Care and pointed to a stack of paperwork on a stool near her chair. She said \"here's the paperwork, feel free to take " "a look at it.\" Reviewed pt's AVS and saw pictures of the x-rays pt had done. She said \"they don't think anything is broken and they told me what I can take to help with the pain.\" Asked pt about the call from the clinic she mentioned when I arrived. I asked if she spoke with someone and she said \"no, they just left a brief message. I couldn't get to the phone because I can't move quickly.\" Thanked her for sharing this with me. She also pointed out a note from her PCP regarding a referral to the orthopedic spine team. She said \"could we talk about that at our next visit? I would like to follow up with them.\" I agreed and told pt I will look up the contact/referral information and bring it to our next visit.    Pt said before she called me this morning, \"I called Sahil and Hugo to tell them I want a button to wear around my neck that I can press for help.\" She said \"falling again made me realize I need to learn from this.\" I asked what Hugo said when she called and pt said \"he already had one lined up that he thought would be the best fit for me.\" Pt said \"to be honest, I was stalling and not being completely honest with my sister and Hugo.\" She said when they hung up, he was \"going to buy it and follow up with me.\" Updated goal. Let her know how happy I was to hear this and asked if she will keep me posted. She agreed.    While we were talking, pt's phone rang. She let it go to [employer] and the caller introduced themself as someone from the [facility]. Pt asked me to  the  so she could answer the call. Pt started speaking with the caller and then said \"can you listen to this? She wants to make an appointment with me.\" I asked pt if I could put it on speaker phone, and she agreed. Introduced myself and explained my role. The caller said her name was Brenda and she was calling from the [place]opedics department at [facility] Brenda said they would like to schedule a " "visit and have Dr. Barahona look at Elizabeth's hip because they're \"concerned there may be a hidden fracture.\" She confirmed that Dr. Phan, who performed pt's hip replacement surgery in the past, \"looked at Elizabeth's x-rays and said she doesn't need surgery.\" Pt expressed relief at hearing the news. Made appointment at the [facility] for pt for Thursday, July 22nd at 3:00 pm. They asked pt to arrive 15 minutes early and to check in at the desk on the fourth floor. I told Brenda that we will follow up with pt's friend who drives her to medical appointments. Thanked her for the call and asked pt if she had any questions for Brenda before hanging up. She said no and thanked Brenda for reaching out.    I asked pt if she would like me to follow up with Katherine and her sister Sahil regarding the appointment. Pt said \"I would really appreciate that.\" Compared the notes I took during Brenda's call with pt's notes, which pt was taking down in a small book. Pt said \"this is where I write everything down or else I have trouble remembering who does what and who I have been talking to.\" Made sure pt had all the details written down correctly in her book, which she said \"thank you for that.\" She showed me her notes from calls she made earlier this morning, which included Shayy Bailey from the [facility] Nurse Program (HBNP). Elizabeth said \"I guess I was pretty confused when I called her this morning because I thought she was your coworker.\" I explained my role versus Shayy's and offered to write down the HBNP and Jessica's numbers in her book. I clarified that Jessica is one of the NP nurses who came to see pt on her birthday and provided foot care. Pt said \"eventually I want to call and have her come back to see me, but I would like a longer foot massage.\" I wrote down the numbers in pt's book and encouraged her to call Jessica directly in the future to set up a time. Updated goal. Pt also mentioned that Jessica \"gave Katherine some information about more " "services for me\" and asked if I could check in with Katherine about this. I agreed.    Before leaving, I asked pt if there was anything I could bring to her or do for her so she could limit how much she is moving around. She asked me to bring her a plate of toast from the kitchen and refill her glass of vanilla almond milk. I noted that her coffee pot was turned on in the kitchen and asked if I could turn it off. She said yes. When I came back into the living room, pt said \"I am really scared to be alone again tonight.\" I acknowledged this and asked pt what might help her feel more comfortable. She said \"well I might keep the radio on.\" She also said \"I could keep busy doing my exercises.\" I encouraged pt to limit her exercises and stretches with her lower body and instead focus on her upper body--arms, neck, wrists. Pt agreed. She also showed me some books she's reading and said \"that will keep my mind busy, too.\"     Asked pt if I could call her on Friday to check in and then set up another visit for next week. She said \"that would be great.\" Encouraged her to call me in the meantime with questions or updates. She also asked me to \"send Dr. Henderson a note and let him know I'm doing okay.\"     HEALTH CARE GOALS:  Goals Addressed as of 7/20/2021 at 8:11 AM                    7/19/21 7/12/21       Patient Stated       Reducing Risks (pt-stated)   60%  50%    Added 6/14/21 by Felisha Luo      Goal Statement: In the next three months, I want to connect with resources and supports to help keep me safe at home.  Date Goal set: 6/11/21  Barriers: finances  Strengths: support system, CP and CC support, open to making changes  Date to Achieve By: 9/11/21  Patient expressed understanding of goal: yes    Action steps to achieve this goal:  1. I will contact Jessica, a RN from the [facility] Nurse Program, at [phone] to schedule another in-home foot care visit.  2. I will ask my sister Sahil and her  Hugo to help " me understand what I can pay for services at home. (in process)  3. With my sister and brother-in-law's help, I will review fall alert system options that the CP CHW shared, choose the best fit for me and purchase one this week. (in process)  4. I will let the CP CHW know when I would like to sign up for [employer]'s phone  program.    Updated: 7/19/21          CHW Plan:  1. Pt will attend her appointment at the [facility] on Thursday, July 22nd at 3 pm.  2. CP CHW will follow up with pt's friend and sister regarding pt's upcoming appointment and ask one of them to provide a ride.  3. CP CHW will look into the orthopedic spine team referral and bring scheduling information to next visit with pt.  4. Pt will work with her brother-in-law for next steps on the fall alert system they purchased today.  5. Pt will contact the CP CHW with questions or updates before next outreach.    Time spent with patient: 105 minutes    Acute concern/Follow-up recommendations: [facility] sports medicine appointment (Thursday, July 22nd at 3 pm)    Next CP visit scheduled: Friday, July 23rd (by phone)

## 2021-07-19 NOTE — RESULT ENCOUNTER NOTE
The radiologist feels there may be a subtle fracture. We need to get you in to an orthopedist asap to sort it out and you may need a CT scan to figure it out

## 2021-07-19 NOTE — PROGRESS NOTES
Community Paramedic Program  CHW resource follow up    Emailed Shayy Bailey from the [facility] Nurse Program. Asked her for their nurse Jessica's phone number and let her know that pt would like another visit.     Shayy sent me a recap of Jessica's initial visit with pt last week. She said Jessica shared [facility]'s phone number and information about the Alternative Care (AC) waiver for pt. She said pt gave Jessica permission to speak with pt's friend Katherine regarding the visit and recommendations.

## 2021-07-19 NOTE — PROGRESS NOTES
"Community Paramedic Program  Community Health Worker Follow Up    Intervention and Education during outreach:     Pt called. She said \"I wanted to let you know that I had a fall over the weekend.\" According to pt, she was \"standing in the kitchen and I just fell backwards.\" Pt said \"luckily nothing is broken\" but she said \"I am in a lot of pain.\" Pt said she's been taking Tylenol and Gabapentin and \"they're starting to kick in thankfully.\" She said \"my dear friend Katehrine was here to help me and took me in so I could get checked out.\" I let pt know how sorry I was to hear this news, as pt has repeatedly shared with me her fear of falling again at home. Pt said \"I am also going to call my sister right now and let her know that we need to buy something for me to wear that has a button to press for help.\" She expressed some frustration with this and said \"I need to get one right away.\" I offered my help to follow up with pt's sister, if she'd like, and she declined.     Pt also asked about \"having that person who visited last week come back and massage my feet.\" She asked if I knew the person's name and phone number. I told pt that Shayy sent me a message summarizing the visit pt had last week with their RN Jessica, who provided foot care to the pt. I offered to follow up with Shayy Bailey from the [facility] Nurse Program to see if she could share Jessica's phone number. Elizabeth agreed.    Set up visit this afternoon at pt's apartment at 3 pm. Pt said \"thank you so much, that will be wonderful to see you.\" Pt said \"I am moving even more slowly, so please give me extra time when you buzz for me to let you in.\"     CHW Plan:   1. Pt will visit with the CP CHW this afternoon at 3 pm at her apartment.  2. Pt will call her sister regarding buying a fall alert system this week.  3. CP CHW will contact the [facility] Nurse Program to request RN Jessica's phone number.    "

## 2021-07-19 NOTE — TELEPHONE ENCOUNTER
Patient has a referral for hip pain, to be seen in 3-5 days. The referral mentions being suspicious for fracture. Sending this for review to make sure I am scheduling with correct provider. Thank you!

## 2021-07-19 NOTE — TELEPHONE ENCOUNTER
I called the patient after her Xrs were reviewed by Dr. Phan who said that her fracture can be managed non-operatively. I called the patient and spoke with her and community paramedic who was visiting with her (Jan) and was able to help get Elizabeth set up for an appointment with Dr. Barahona this Thursday at 3:00.    Brenda Chung ATC

## 2021-07-20 ENCOUNTER — PATIENT OUTREACH (OUTPATIENT)
Dept: OTHER | Facility: CLINIC | Age: 81
End: 2021-07-20

## 2021-07-20 ENCOUNTER — HOSPITAL ENCOUNTER (OUTPATIENT)
Facility: CLINIC | Age: 81
Setting detail: OBSERVATION
Discharge: NURSING, REHAB OR LONG-TERM CARE | End: 2021-07-22
Attending: INTERNAL MEDICINE | Admitting: NURSE PRACTITIONER
Payer: MEDICARE

## 2021-07-20 ENCOUNTER — APPOINTMENT (OUTPATIENT)
Dept: CT IMAGING | Facility: CLINIC | Age: 81
End: 2021-07-20
Attending: INTERNAL MEDICINE
Payer: MEDICARE

## 2021-07-20 ENCOUNTER — PATIENT OUTREACH (OUTPATIENT)
Dept: CARE COORDINATION | Facility: CLINIC | Age: 81
End: 2021-07-20
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
## 2021-07-20 DIAGNOSIS — S72.114A CLOSED NONDISPLACED FRACTURE OF GREATER TROCHANTER OF RIGHT FEMUR, INITIAL ENCOUNTER (H): ICD-10-CM

## 2021-07-20 DIAGNOSIS — S72.001A HIP FRACTURE, RIGHT, CLOSED, INITIAL ENCOUNTER (H): ICD-10-CM
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
## 2021-07-20 DIAGNOSIS — Z96.641 PRESENCE OF RIGHT ARTIFICIAL HIP JOINT: ICD-10-CM

## 2021-07-20 DIAGNOSIS — Z20.822 CONTACT WITH AND (SUSPECTED) EXPOSURE TO COVID-19: ICD-10-CM

## 2021-07-20 LAB
ABO/RH(D): NORMAL
ALBUMIN SERPL-MCNC: 3.6 G/DL (ref 3.4–5)
ALP SERPL-CCNC: 83 U/L (ref 40–150)
ALT SERPL W P-5'-P-CCNC: 65 U/L (ref 0–50)
ANION GAP SERPL CALCULATED.3IONS-SCNC: 1 MMOL/L (ref 3–14)
ANTIBODY SCREEN: NEGATIVE
AST SERPL W P-5'-P-CCNC: 31 U/L (ref 0–45)
BASOPHILS # BLD AUTO: 0 10E3/UL (ref 0–0.2)
BASOPHILS NFR BLD AUTO: 1 %
BILIRUB SERPL-MCNC: 1.2 MG/DL (ref 0.2–1.3)
BUN SERPL-MCNC: 22 MG/DL (ref 7–30)
CALCIUM SERPL-MCNC: 9.1 MG/DL (ref 8.5–10.1)
CHLORIDE BLD-SCNC: 108 MMOL/L (ref 94–109)
CO2 SERPL-SCNC: 30 MMOL/L (ref 20–32)
CREAT SERPL-MCNC: 0.64 MG/DL (ref 0.52–1.04)
EOSINOPHIL # BLD AUTO: 0 10E3/UL (ref 0–0.7)
EOSINOPHIL NFR BLD AUTO: 0 %
ERYTHROCYTE [DISTWIDTH] IN BLOOD BY AUTOMATED COUNT: 13.7 % (ref 10–15)
GFR SERPL CREATININE-BSD FRML MDRD: 83 ML/MIN/1.73M2
GLUCOSE BLD-MCNC: 71 MG/DL (ref 70–99)
HCT VFR BLD AUTO: 33.2 % (ref 35–47)
HGB BLD-MCNC: 10.7 G/DL (ref 11.7–15.7)
IMM GRANULOCYTES # BLD: 0 10E3/UL
IMM GRANULOCYTES NFR BLD: 0 %
INR PPP: 1.02 (ref 0.85–1.15)
LYMPHOCYTES # BLD AUTO: 0.6 10E3/UL (ref 0.8–5.3)
LYMPHOCYTES NFR BLD AUTO: 13 %
MCH RBC QN AUTO: 32.1 PG (ref 26.5–33)
MCHC RBC AUTO-ENTMCNC: 32.2 G/DL (ref 31.5–36.5)
MCV RBC AUTO: 100 FL (ref 78–100)
MONOCYTES # BLD AUTO: 0.5 10E3/UL (ref 0–1.3)
MONOCYTES NFR BLD AUTO: 10 %
NEUTROPHILS # BLD AUTO: 3.9 10E3/UL (ref 1.6–8.3)
NEUTROPHILS NFR BLD AUTO: 76 %
NRBC # BLD AUTO: 0 10E3/UL
NRBC BLD AUTO-RTO: 0 /100
PLATELET # BLD AUTO: 150 10E3/UL (ref 150–450)
POTASSIUM BLD-SCNC: 3.8 MMOL/L (ref 3.4–5.3)
PROT SERPL-MCNC: 6.9 G/DL (ref 6.8–8.8)
RBC # BLD AUTO: 3.33 10E6/UL (ref 3.8–5.2)
SARS-COV-2 RNA RESP QL NAA+PROBE: NEGATIVE
SODIUM SERPL-SCNC: 139 MMOL/L (ref 133–144)
SPECIMEN EXPIRATION DATE: NORMAL
WBC # BLD AUTO: 5.1 10E3/UL (ref 4–11)

## 2021-07-20 PROCEDURE — U0003 INFECTIOUS AGENT DETECTION BY NUCLEIC ACID (DNA OR RNA); SEVERE ACUTE RESPIRATORY SYNDROME CORONAVIRUS 2 (SARS-COV-2) (CORONAVIRUS DISEASE [COVID-19]), AMPLIFIED PROBE TECHNIQUE, MAKING USE OF HIGH THROUGHPUT TECHNOLOGIES AS DESCRIBED BY CMS-2020-01-R: HCPCS | Performed by: INTERNAL MEDICINE

## 2021-07-20 PROCEDURE — 73700 CT LOWER EXTREMITY W/O DYE: CPT | Mod: 26 | Performed by: RADIOLOGY

## 2021-07-20 PROCEDURE — 36592 COLLECT BLOOD FROM PICC: CPT | Performed by: INTERNAL MEDICINE

## 2021-07-20 PROCEDURE — 99285 EMERGENCY DEPT VISIT HI MDM: CPT | Mod: 25 | Performed by: INTERNAL MEDICINE

## 2021-07-20 PROCEDURE — C9803 HOPD COVID-19 SPEC COLLECT: HCPCS | Performed by: INTERNAL MEDICINE

## 2021-07-20 PROCEDURE — 86900 BLOOD TYPING SEROLOGIC ABO: CPT | Performed by: INTERNAL MEDICINE

## 2021-07-20 PROCEDURE — 73700 CT LOWER EXTREMITY W/O DYE: CPT | Mod: RT,MG

## 2021-07-20 PROCEDURE — G0378 HOSPITAL OBSERVATION PER HR: HCPCS

## 2021-07-20 PROCEDURE — 93010 ELECTROCARDIOGRAM REPORT: CPT | Performed by: INTERNAL MEDICINE

## 2021-07-20 PROCEDURE — 99220 PR INITIAL OBSERVATION CARE,LEVEL III: CPT | Performed by: NURSE PRACTITIONER

## 2021-07-20 PROCEDURE — 80053 COMPREHEN METABOLIC PANEL: CPT | Performed by: INTERNAL MEDICINE

## 2021-07-20 PROCEDURE — G1004 CDSM NDSC: HCPCS | Performed by: RADIOLOGY

## 2021-07-20 PROCEDURE — 93005 ELECTROCARDIOGRAM TRACING: CPT | Performed by: INTERNAL MEDICINE

## 2021-07-20 PROCEDURE — 250N000013 HC RX MED GY IP 250 OP 250 PS 637: Performed by: NURSE PRACTITIONER

## 2021-07-20 PROCEDURE — 85025 COMPLETE CBC W/AUTO DIFF WBC: CPT | Performed by: INTERNAL MEDICINE

## 2021-07-20 PROCEDURE — 85610 PROTHROMBIN TIME: CPT | Performed by: INTERNAL MEDICINE

## 2021-07-20 RX ORDER — METHOCARBAMOL 500 MG/1
500 TABLET, FILM COATED ORAL 4 TIMES DAILY
Status: DISCONTINUED | OUTPATIENT
Start: 2021-07-21 | End: 2021-07-21

## 2021-07-20 RX ORDER — LIDOCAINE 4 G/G
2 PATCH TOPICAL
Status: DISCONTINUED | OUTPATIENT
Start: 2021-07-20 | End: 2021-07-22 | Stop reason: HOSPADM

## 2021-07-20 RX ORDER — LIDOCAINE 40 MG/G
CREAM TOPICAL
Status: DISCONTINUED | OUTPATIENT
Start: 2021-07-20 | End: 2021-07-22 | Stop reason: HOSPADM

## 2021-07-20 RX ORDER — ACETAMINOPHEN 650 MG/1
650 SUPPOSITORY RECTAL EVERY 4 HOURS PRN
Status: DISCONTINUED | OUTPATIENT
Start: 2021-07-20 | End: 2021-07-20

## 2021-07-20 RX ORDER — POLYETHYLENE GLYCOL 3350 17 G/17G
17 POWDER, FOR SOLUTION ORAL DAILY
Status: DISCONTINUED | OUTPATIENT
Start: 2021-07-21 | End: 2021-07-22 | Stop reason: HOSPADM

## 2021-07-20 RX ORDER — ACETAMINOPHEN 325 MG/1
650 TABLET ORAL EVERY 6 HOURS
Status: DISCONTINUED | OUTPATIENT
Start: 2021-07-21 | End: 2021-07-22 | Stop reason: HOSPADM

## 2021-07-20 RX ORDER — ACETAMINOPHEN 325 MG/1
650 TABLET ORAL EVERY 4 HOURS PRN
Status: DISCONTINUED | OUTPATIENT
Start: 2021-07-20 | End: 2021-07-20

## 2021-07-20 RX ORDER — ONDANSETRON 2 MG/ML
4 INJECTION INTRAMUSCULAR; INTRAVENOUS EVERY 6 HOURS PRN
Status: DISCONTINUED | OUTPATIENT
Start: 2021-07-20 | End: 2021-07-22 | Stop reason: HOSPADM

## 2021-07-20 RX ORDER — GABAPENTIN 100 MG/1
200 CAPSULE ORAL 2 TIMES DAILY
Status: DISCONTINUED | OUTPATIENT
Start: 2021-07-20 | End: 2021-07-21

## 2021-07-20 RX ORDER — ONDANSETRON 4 MG/1
4 TABLET, ORALLY DISINTEGRATING ORAL EVERY 6 HOURS PRN
Status: DISCONTINUED | OUTPATIENT
Start: 2021-07-20 | End: 2021-07-22 | Stop reason: HOSPADM

## 2021-07-20 RX ADMIN — ACETAMINOPHEN 650 MG: 325 TABLET, FILM COATED ORAL at 22:30

## 2021-07-20 RX ADMIN — GABAPENTIN 200 MG: 100 CAPSULE ORAL at 23:23

## 2021-07-20 ASSESSMENT — ENCOUNTER SYMPTOMS
DIFFICULTY URINATING: 0
COLOR CHANGE: 0
ARTHRALGIAS: 0
NECK STIFFNESS: 0
HEADACHES: 0
FEVER: 0
SHORTNESS OF BREATH: 0
CONFUSION: 0
EYE REDNESS: 0
ABDOMINAL PAIN: 0

## 2021-07-20 ASSESSMENT — MIFFLIN-ST. JEOR: SCORE: 930

## 2021-07-20 NOTE — PROGRESS NOTES
"Community Paramedic Program  Community Health Worker Follow Up    Intervention and Education during outreach:     Received a voicemail and email response from pt's friend Katherine. She confirmed that she is available to take pt [place] [facility] [place]. She asked me to follow up directly with pt's sister Sahil because \"it is time for Elizabeth's family to step in and make some decisions, based on what happened over the weekend.\" She said that she passed the information about the Alternative Care waiver to Sahil and \"I'm not sure that Sahil knows what the next step is or how to navigate that process with [facility].\" Katherine asked me to reach out with any other questions or concerns and said \"thank you for sending a note about your visit. It makes me feel better knowing that Elizabeth has other people who care and are coming in to check on her.\"     Responded to Katherine's email. Thanked her for the message and let her know I plan to call Elizabeth's sister tomorrow to check in and offer my support to navigate the [employer] assessment and Alternative Care waiver process on pt's behalf.    CHW Plan:   1. Pt's friend Katherine will drive pt to her [place] on Thursday [place] [facility]  2 CP CHW will call pt's sister Sahil tomorrow.    "

## 2021-07-20 NOTE — ED PROVIDER NOTES
[place] EMERGENCY DEPARTMENT ([facility])  7/20/21  History     Chief Complaint   Patient presents with     Fall     The history is provided by the patient and medical records.     Elizabeth Taylor is a 81 year old female with a past medical history significant for Anal ca s/p RT who presents to the Emergency Department for evaluation of right hip pain 2/2 mechanical fall on Saturday (7/17).  Patient reports that she was standing up in her kitchen on Saturday to grab a snack when she had a mechanical fall backwards at approximately 3 AM.  She states she developed severe right hip pain, and was down on the floor until 4 AM.  She was noted to have went to an urgent care the following day with her friend.  During this visit she did undergo an x-ray of the right hip which showed suspicion for nondisplaced right femur greater trochanter periprosthetic fracture.  Full impression noted below.  Patient has had ongoing pain since this visit, and presents here to the ED for further evaluation.    XR RIGHT HIP TWO-THREE VIEWS  7/18/2021   IMPRESSION:  1.  Findings suspicious for nondisplaced right femur greater  trochanter periprosthetic fracture. There is a suggestion of a  transverse lucency in this region on the frontal view and oblique  lucency in this region on the frog leg lateral view. No additional  areas suspicious for fracture. This could be further evaluated with CT  if clinically indicated.  2.  Right hip hemiarthroplasty with proximal femur and a prosthesis.  The component is well seated. No evidence of loosening or subsidence.  3.  Normal joint alignment.    I have reviewed the Medications, Allergies, Past Medical and Surgical History, and Social History in the Epic system.  PAST MEDICAL HISTORY:   Past Medical History:   Diagnosis Date     Anal cancer (H)      Endometriosis, site unspecified      Thyroiditis, unspecified     Thryoiditis-resolved       PAST SURGICAL HISTORY:   Past Surgical  History:   Procedure Laterality Date     APPENDECTOMY      as a child     ARTHROPLASTY HIP Right 7/26/2018    Procedure: ARTHROPLASTY HIP;  Right Hip Hemiarthroplasty;  Surgeon: Zaire Phan MD;  Location: [facility] OR     COLONOSCOPY N/A 4/13/2017    Procedure: COLONOSCOPY;  Surgeon: Juan Dos Santos MD;  Location: [facility] GI     INSERT PORT VASCULAR ACCESS Right 2/8/2018    Procedure: INSERT PORT VASCULAR ACCESS;  Place Single Lumen Venous Chest Port Placement;  Surgeon: Zaire Moreira PA-C;  Location: [facility] OR     SURGICAL HISTORY OF -       endometriosis       Past medical history, past surgical history, medications, and allergies were reviewed with the patient. Additional pertinent items: None    FAMILY HISTORY:   Family History   Problem Relation Age of Onset     Cancer Sister      Cancer Maternal Grandmother         lymphoma     Heart Disease Father         MI     Uterine Cancer Niece        SOCIAL HISTORY:   Social History     Tobacco Use     Smoking status: Never Smoker     Smokeless tobacco: Never Used   Substance Use Topics     Alcohol use: No     Social history was reviewed with the patient. Additional pertinent items: None      Current Discharge Medication List      CONTINUE these medications which have NOT CHANGED    Details   acetaminophen (TYLENOL) 500 MG tablet Take 1,000 mg by mouth 3 times daily      Calcium-Magnesium-Zinc 333-133-5 MG TABS per tablet Take 1 tablet by mouth daily      cholecalciferol (VITAMIN D3) 5000 units (125 mcg) capsule 1 CAP BY MOUTH DAILY (DX: OSTEOPOROSIS)  Qty: 90 capsule, Refills: 3    Associated Diagnoses: Age-related osteoporosis with current pathological fracture, initial encounter; Pathological fracture of pelvis, unspecified pathological cause, initial encounter      conjugated estrogens (PREMARIN) 0.625 MG/GM vaginal cream Apply locally to clitoral randall pea sized amount daily for 2 weeks then prn.  Qty: 4 g, Refills: 0    Associated Diagnoses:  "Malignant neoplasm of anal canal (H); Atrophy of vulva      docusate sodium (COLACE) 100 MG capsule Take 1 capsule (100 mg) by mouth 2 times daily  Qty: 60 capsule    Associated Diagnoses: Drug-induced constipation      estradiol (ESTRACE) 0.1 MG/GM vaginal cream Apply locally to clitoral randall pea sized amount daily for 2 weeks then prn.  Qty: 42.5 g, Refills: 0    Associated Diagnoses: Malignant neoplasm of anal canal (H); Atrophic vaginitis; Vaginal stenosis      gabapentin (NEURONTIN) 100 MG capsule TAKE ONE TO FOUR CAPSULES AT BEDTIME AS NEEDED FOR MUSCLE SPASMS  Qty: 240 capsule, Refills: 1    Associated Diagnoses: Muscle spasms of both lower extremities      metroNIDAZOLE (METROGEL) 0.75 % external gel Apply topically 2 times daily  Qty: 45 g, Refills: 3    Associated Diagnoses: Rosacea      vitamin B complex with vitamin C (VITAMIN  B COMPLEX) TABS tablet Take 1 tablet by mouth daily      VITAMIN E PO Take 1 capsule by mouth daily                Allergies   Allergen Reactions     Darvon [Propoxyphene]      Had reaction in teen years     Ketoconazole Rash     Penicillins      As a child        Review of Systems   Constitutional: Negative for fever.   HENT: Negative for congestion.    Eyes: Negative for redness.   Respiratory: Negative for shortness of breath.    Cardiovascular: Negative for chest pain.   Gastrointestinal: Negative for abdominal pain.   Genitourinary: Negative for difficulty urinating.   Musculoskeletal: Negative for arthralgias and neck stiffness.        R-hip pain   Skin: Negative for color change.   Neurological: Negative for headaches.   Psychiatric/Behavioral: Negative for confusion.   All other systems reviewed and are negative.      Physical Exam   BP: 124/73  Pulse: 70  Temp: 98.9  F (37.2  C)  Resp: 14  Height: 162.6 cm (5' 4\")  Weight: 48.5 kg (106 lb 14.8 oz)  SpO2: 98 %      Physical Exam  Vitals and nursing note reviewed.   Constitutional:       General: She is not in acute " distress.     Appearance: She is not diaphoretic.   HENT:      Head: Atraumatic.      Mouth/Throat:      Pharynx: No oropharyngeal exudate.   Eyes:      General: No scleral icterus.     Pupils: Pupils are equal, round, and reactive to light.   Cardiovascular:      Rate and Rhythm: Normal rate and regular rhythm.      Heart sounds: Normal heart sounds. No murmur heard.   No friction rub. No gallop.    Pulmonary:      Effort: Pulmonary effort is normal. No respiratory distress.      Breath sounds: Normal breath sounds. No stridor. No wheezing, rhonchi or rales.   Chest:      Chest wall: No tenderness.   Abdominal:      General: Abdomen is flat. Bowel sounds are normal. There is no distension.      Palpations: Abdomen is soft. There is no mass.      Tenderness: There is no abdominal tenderness. There is no right CVA tenderness, left CVA tenderness, guarding or rebound.      Hernia: No hernia is present.   Musculoskeletal:         General: Tenderness (right hip) present.      Cervical back: Neck supple.   Skin:     General: Skin is warm.      Findings: No rash.         ED Course     At 3:31 PM the patient was seen and examined by Aristides Shetty MD in Room ED08.        Procedures            EKG Interpretation:      Interpreted by Aristides Shetty MD, MD  Time reviewed: on arrival  Symptoms at time of EKG: right hip pain   Rhythm: normal sinus   Rate: normal  Axis: normal  Ectopy: none  Conduction: normal  ST Segments/ T Waves: No ST-T wave changes  Q Waves: none  Comparison to prior: Unchanged from 7/2018    Clinical Impression: normal EKG              Results for orders placed or performed during the hospital encounter of 07/20/21 (from the past 24 hour(s))   EKG 12-lead, tracing only   Result Value Ref Range    Systolic Blood Pressure  mmHg    Diastolic Blood Pressure  mmHg    Ventricular Rate 59 BPM    Atrial Rate 59 BPM    ND Interval 148 ms    QRS Duration 88 ms     ms    QTc 413 ms    P Axis 83 degrees    R  AXIS 43 degrees    T Axis 70 degrees    Interpretation ECG       Sinus bradycardia  Minimal voltage criteria for LVH, may be normal variant  Borderline ECG     CBC with platelets differential    Narrative    The following orders were created for panel order CBC with platelets differential.  Procedure                               Abnormality         Status                     ---------                               -----------         ------                     CBC with platelets and d...[[id number]]  Abnormal            Final result                 Please view results for these tests on the individual orders.   ABO/Rh type and screen    Narrative    The following orders were created for panel order ABO/Rh type and screen.  Procedure                               Abnormality         Status                     ---------                               -----------         ------                     Adult Type and Screen[[id number]]                            Final result                 Please view results for these tests on the individual orders.   INR   Result Value Ref Range    INR 1.02 0.85 - 1.15   Comprehensive metabolic panel   Result Value Ref Range    Sodium 139 133 - 144 mmol/L    Potassium 3.8 3.4 - 5.3 mmol/L    Chloride 108 94 - 109 mmol/L    Carbon Dioxide (CO2) 30 20 - 32 mmol/L    Anion Gap 1 (L) 3 - 14 mmol/L    Urea Nitrogen 22 7 - 30 mg/dL    Creatinine 0.64 0.52 - 1.04 mg/dL    Calcium 9.1 8.5 - 10.1 mg/dL    Glucose 71 70 - 99 mg/dL    Alkaline Phosphatase 83 40 - 150 U/L    AST 31 0 - 45 U/L    ALT 65 (H) 0 - 50 U/L    Protein Total 6.9 6.8 - 8.8 g/dL    Albumin 3.6 3.4 - 5.0 g/dL    Bilirubin Total 1.2 0.2 - 1.3 mg/dL    GFR Estimate 83 >60 mL/min/1.73m2   CBC with platelets and differential   Result Value Ref Range    WBC Count 5.1 4.0 - 11.0 10e3/uL    RBC Count 3.33 (L) 3.80 - 5.20 10e6/uL    Hemoglobin 10.7 (L) 11.7 - 15.7 g/dL    Hematocrit 33.2 (L) 35.0 - 47.0 %     78 - 100 fL    MCH  32.1 26.5 - 33.0 pg    MCHC 32.2 31.5 - 36.5 g/dL    RDW 13.7 10.0 - 15.0 %    Platelet Count 150 150 - 450 10e3/uL    % Neutrophils 76 %    % Lymphocytes 13 %    % Monocytes 10 %    % Eosinophils 0 %    % Basophils 1 %    % Immature Granulocytes 0 %    NRBCs per 100 WBC 0 <1 /100    Absolute Neutrophils 3.9 1.6 - 8.3 10e3/uL    Absolute Lymphocytes 0.6 (L) 0.8 - 5.3 10e3/uL    Absolute Monocytes 0.5 0.0 - 1.3 10e3/uL    Absolute Eosinophils 0.0 0.0 - 0.7 10e3/uL    Absolute Basophils 0.0 0.0 - 0.2 10e3/uL    Absolute Immature Granulocytes 0.0 <=0.0 10e3/uL    Absolute NRBCs 0.0 10e3/uL   Adult Type and Screen   Result Value Ref Range    ABO/RH(D) AB POS     Antibody Screen Negative Negative    SPECIMEN EXPIRATION DATE [id number]    Asymptomatic COVID-19 Virus (Coronavirus) by PCR Nasopharyngeal    Specimen: Nasopharyngeal; Swab    Narrative    The following orders were created for panel order Asymptomatic COVID-19 Virus (Coronavirus) by PCR Nasopharyngeal.  Procedure                               Abnormality         Status                     ---------                               -----------         ------                     SARS-COV2 (COVID-19) Vir...[[id number]]  Normal              Final result                 Please view results for these tests on the individual orders.   SARS-COV2 (COVID-19) Virus RT-PCR    Specimen: Nasopharyngeal; Swab   Result Value Ref Range    SARS CoV2 PCR Negative Negative    Narrative    Testing was performed using the Xpert Xpress SARS-CoV-2 Assay on the  Cepheid Gene-Xpert Instrument Systems. Additional information about  this Emergency Use Authorization (EUA) assay can be found via the Lab  Guide. This test should be ordered for the detection of SARS-CoV-2 in  individuals who meet SARS-CoV-2 clinical and/or epidemiological  criteria. Test performance is unknown in asymptomatic patients. This  test is for in vitro diagnostic use under the FDA EUA for  laboratories certified  under CLIA to perform high complexity testing.  This test has not been FDA cleared or approved. A negative result  does not rule out the presence of PCR inhibitors in the specimen or  target RNA in concentration below the limit of detection for the  assay. The possibility of a false negative should be considered if  the patient's recent exposure or clinical presentation suggests  COVID-19. This test was validated by the [facility]. This laboratory is certified under  the Clinical Laboratory Improvement Amendments of 1988 (CLIA-88) as  qualified to perform high complexity laboratory testing.     CT Hip Right w/o Contrast    Narrative    EXAM: CT HIP RIGHT W/O CONTRAST  7/20/2021 4:33 PM      HISTORY: Fracture, hip    COMPARISON: Radiographs 7/18/2021    TECHNIQUE: CT imaging of the right hip without IV contrast.  Multiplanar reconstructions.    FINDINGS:      images demonstrates right hip hemiarthroplasty. Degenerative  changes of the left hip. Fracture through the right greater trochanter  better demonstrated on CT images.    Similar to recent radiographs, there is a comminuted, nondisplaced  fracture of the greater tuberosity in this patient status post right  hip hemiarthroplasty. No substantial change compared to recent  radiographs. Bony detail is compromised by metallic beam hardening  artifact. No substantial distal fracture extension.    Right sacroiliac joint degenerative change. Partially visualized  lumbar spondylosis.    Fatty replacement of the gluteus minimus. Cholelithiasis.       Impression    IMPRESSION:     1. Similar to recent radiographs, comminuted nondisplaced fracture of  the greater trochanter in the setting of right hip hemiarthroplasty.    2. Cholelithiasis.    ANGELA HORNE MD (Joe)         SYSTEM ID:  [id number]     Medications   cholecalciferol (VITAMIN D3) 125 mcg (5000 units) capsule 125 mcg (has no administration in time  range)   melatonin tablet 1 mg (has no administration in time range)   ondansetron (ZOFRAN-ODT) ODT tab 4 mg (has no administration in time range)     Or   ondansetron (ZOFRAN) injection 4 mg (has no administration in time range)   lidocaine 1 % 0.1-1 mL (has no administration in time range)   lidocaine (LMX4) cream (has no administration in time range)   sodium chloride (PF) 0.9% PF flush 3 mL (has no administration in time range)   sodium chloride (PF) 0.9% PF flush 3 mL (3 mLs Intracatheter Given 7/20/21 2325)   polyethylene glycol (MIRALAX) Packet 17 g (has no administration in time range)   acetaminophen (TYLENOL) tablet 650 mg (has no administration in time range)   Lidocaine (LIDOCARE) 4 % Patch 2 patch (2 patches Transdermal Not Given 7/20/21 2323)     And   lidocaine patch in PLACE ( Transdermal Patch Free Period 7/20/21 2324)   methocarbamol (ROBAXIN) tablet 500 mg (has no administration in time range)   gabapentin (NEURONTIN) capsule 200 mg (200 mg Oral Given 7/20/21 2323)             Assessments & Plan (with Medical Decision Making)    Right hip periprosthetic fracture after fall few days ago, CT confirmed today, labs and EKG done, Ortho consult done-most likely non-operative, D/W ED OBS JOVANI-admit for PT SW in am for possible TCU.         I have reviewed the nursing notes.    I have reviewed the findings, diagnosis, plan and need for follow up with the patient.    Current Discharge Medication List          Final diagnoses:   Hip fracture, right, closed, initial encounter (H)   Fall, initial encounter       I, Jluis Graf am serving as a trained medical scribe to document services personally performed by Aristides Shetty MD, based on the provider's statements to me.      IAristides MD, was physically present and have reviewed and verified the accuracy of this note documented by Jluis Graf.     Aristides Shetty MD  7/20/2021   [facility] EMERGENCY DEPARTMENT     Diogenes  MD Aristides  07/20/21 [id number]

## 2021-07-20 NOTE — PROGRESS NOTES
Clinic Care Coordination Contact    Care Coordination Clinician Chart Review  Situation: Patient chart reviewed by care coordinator.       Background: Care Coordination initial assessment and enrollment to Care Coordination was successful.   Patient centered goals were developed with participation from patient.  THOMAS CC handed patient off to CHW for continued outreach every 30 days.        Assessment: Per chart review, patient outreach completed by CP CHW on 7/19/21.  Patient is actively working to accomplish goals.  Patient's goals remain appropriate and relevant at this time.   Patient is not yet due for updated Complex Care Plan.  Annual assessment will be due 5/28/21.      Goals        Medical (pt-stated)       Goal Statement: I want to complete a healthcare directive in the next two months.  Date Goal set: 7/8/21  Barriers: none  Strengths: family support, CC & CP support, cancer free  Date to Achieve By: 9/8/21  Patient expressed understanding of goal: yes    Action steps to achieve this goal:  1. I will ask the CP CHW to bring information to guide a conversation about my wishes and preferences to our next visit.  2. I will see if I can find the healthcare directive I started when I was at the [facility] in 2018.  3. I will continue speaking with my family and friends about this topic.           Reducing Risks (pt-stated)       Goal Statement: In the next three months, I want to connect with resources and supports to help keep me safe at home.  Date Goal set: 6/11/21  Barriers: finances  Strengths: support system, CP and CC support, open to making changes  Date to Achieve By: 9/11/21  Patient expressed understanding of goal: yes    Action steps to achieve this goal:  1. I will contact Jessica, a RN from the [facility], at [phone] to schedule another in-home foot care visit.  2. I will ask my sister Sahil and her  Hugo to help me understand what I can pay for services at  home. (in process)  3. With my sister and brother-in-law's help, I will review fall alert system options that the CP CHW shared, choose the best fit for me and purchase one this week. (in process)  4. I will let the CP CHW know when I would like to sign up for [employer]'s phone  program.    Updated: 7/19/21                Plan/Recommendations: The patient will continue working with Care Coordination to achieve goals as above.  CHW will involve SW CC as needed or if patient is ready to move to maintenance.  SW CC will continue to monitor progress to goals and CHW outreaches every 6 weeks.   Care Plan updated and mailed to patient: QUYNH Edmondson   [facility] Care Coordination  Tel: [phone]

## 2021-07-20 NOTE — CONSULTS
[facility], Orthopaedic Surgery Consultation    Elizabeth Taylor MRN# [id number]   Age: 81 year old YOB: 1940     Reason for consult: Right periprosthetic hip fracture       Requesting physician: Dr. Shetty         Assessment and Plan:   Assessment:  82 yo F with history of right hip hemiarthroplasty presenting with [place] A periprosthetic fracture.    Plan:  -No plan for operative intervention  -WBAT RLE with walker  -Given the patient's recent history of falls and current tenuous independent living status she would likely benefit from PT OT and likely admission to TCU.  Discussed ED provider, patient may be held in ED observation unit for subsequent PT OT assessment versus admission to the medicine team for therapies and TCU placement.  -Follow-up: Dr. Phan 6 weeks for repeat XR R hip    Arnold Brown MD on 7/21/2021 at 8:01 AM            History of Present Illness:   Patient was seen and examined by me. History, PMH, Meds, SH, complete ROS (10 organ systems) and PE reviewed with patient and prior medical records.      Elizabeth Taylor is an 82 yo F who presents with right hip pain after fall at her residence 2 days ago. Patient reports that she has had increased frequency of falls recently. She reports she was standing in her kitchen and fell backwards onto her back. She subsequently had right lateral hip pain, but was able to pull herself to stand and continue ambulating at her home residence where she lives independently. She then decided to seek medical attention because the pain did not resolve and she had increased difficulty caring for herself at home.     Patient owns both a cane and walker but states that she generally walks around her apartment without them preferring to brace herself on surrounding furniture.    Patient underwent right hip hemiarthroplasty for femoral neck fracture on 7/26/18 with Dr. Phan at [facility] ([facility]  Hemiarthroplasty).          Past Medical History:     Past Medical History:   Diagnosis Date     Anal cancer (H)      Endometriosis, site unspecified      Thyroiditis, unspecified     Thryoiditis-resolved             Past Surgical History:     Past Surgical History:   Procedure Laterality Date     APPENDECTOMY      as a child     ARTHROPLASTY HIP Right 7/26/2018    Procedure: ARTHROPLASTY HIP;  Right Hip Hemiarthroplasty;  Surgeon: Zaire Phan MD;  Location: [facility]     COLONOSCOPY N/A 4/13/2017    Procedure: COLONOSCOPY;  Surgeon: Juan Dos Santos MD;  Location: [facility] GI     INSERT PORT VASCULAR ACCESS Right 2/8/2018    Procedure: INSERT PORT VASCULAR ACCESS;  Place Single Lumen Venous Chest Port Placement;  Surgeon: Zaire Moreira PA-C;  [facility]     SURGICAL HISTORY OF -       endometriosis             Social History:     Social History     Socioeconomic History     Marital status: Single     Spouse name: None     Number of children: None     Years of education: None     Highest education level: None   Occupational History     None   Tobacco Use     Smoking status: Never Smoker     Smokeless tobacco: Never Used   Substance and Sexual Activity     Alcohol use: No     Drug use: No     Sexual activity: Yes     Partners: Male   Other Topics Concern     Parent/sibling w/ CABG, MI or angioplasty before 65F 55M? Not Asked   Social History Narrative     None     Social Determinants of Health     Financial Resource Strain: Medium Risk     Difficulty of Paying Living Expenses: Somewhat hard   Food Insecurity: No Food Insecurity     Worried About Running Out of Food in the Last Year: Never true     Ran Out of Food in the Last Year: Never true   Transportation Needs: No Transportation Needs     Lack of Transportation (Medical): No     Lack of Transportation (Non-Medical): No   Physical Activity: Insufficiently Active     Days of Exercise per Week: 5 days     Minutes of Exercise per  Session: 10 min   Stress: Stress Concern Present     Feeling of Stress : To some extent   Social Connections:      Frequency of Communication with Friends and Family:      Frequency of Social Gatherings with Friends and Family:      Attends Rastafari Services:      Active Member of Clubs or Organizations:      Attends Club or Organization Meetings:      Marital Status:    Intimate Partner Violence:      Fear of Current or Ex-Partner:      Emotionally Abused:      Physically Abused:      Sexually Abused:    Lives independently with large amount of assistance from friend and sister.  There are 8 stairs to get to her second floor apartment.         Family History:     Family History   Problem Relation Age of Onset     Cancer Sister      Cancer Maternal Grandmother         lymphoma     Heart Disease Father         MI     Uterine Cancer Niece               Medications:     Current Facility-Administered Medications   Medication     heparin 100 UNIT/ML injection 500 Units     Current Outpatient Medications   Medication Sig     acetaminophen (TYLENOL) 500 MG tablet Take 1,000 mg by mouth 3 times daily     Calcium-Magnesium-Zinc 333-133-5 MG TABS per tablet Take 1 tablet by mouth daily     cholecalciferol (VITAMIN D3) 5000 units (125 mcg) capsule 1 CAP BY MOUTH DAILY (DX: OSTEOPOROSIS)     conjugated estrogens (PREMARIN) 0.625 MG/GM vaginal cream Apply locally to clitoral randall pea sized amount daily for 2 weeks then prn. (Patient not taking: Reported on 6/28/2021)     docusate sodium (COLACE) 100 MG capsule Take 1 capsule (100 mg) by mouth 2 times daily (Patient not taking: Reported on 6/28/2021)     estradiol (ESTRACE) 0.1 MG/GM vaginal cream Apply locally to clitoral randall pea sized amount daily for 2 weeks then prn. (Patient not taking: Reported on 6/28/2021)     gabapentin (NEURONTIN) 100 MG capsule TAKE ONE TO FOUR CAPSULES AT BEDTIME AS NEEDED FOR MUSCLE SPASMS     metroNIDAZOLE (METROGEL) 0.75 % external gel Apply  "topically 2 times daily     vitamin B complex with vitamin C (VITAMIN  B COMPLEX) TABS tablet Take 1 tablet by mouth daily     VITAMIN E PO Take 1 capsule by mouth daily             Allergies:      Allergies   Allergen Reactions     Darvon [Propoxyphene]      Had reaction in teen years     Ketoconazole Rash     Penicillins      As a child            Review of Systems:   A comprehensive 10 point review of systems (constitutional, ENT, cardiac, peripheral vascular, respiratory, GI, , Musculoskeletal, skin, Neurological) was performed and found to be negative except as described in this note.           Physical Exam:   COMPLETE EXAMINATION:   VITAL SIGNS: /73   Pulse 70   Temp 98.9  F (37.2  C) (Oral)   Resp 14   Ht 1.626 m (5' 4\")   Wt 48.5 kg (106 lb 14.8 oz)   LMP  (LMP Unknown)   SpO2 98%   BMI 18.35 kg/m    RESP: Non labored breathing  SKIN: Grossly normal   MUSCULOSKELETAL:   RLE  -skin intact, no deformity  -TTP over the greater trochanter, no TTP over knee/ankle/foot  -No pain with log roll + pain with hip abduction. Patient able to perform active straight leg raise.  - Fires EHL/FHL/TA/GSC/knee flexion/knee extension/ hip flexion  -SILT dp/sp/sundar/saph/tib nerve distributions  -Foot wwp          Data:     Imaging:  XR R hip 7/18/21 and CT R hip 7/20/21 demonstrate [place] A periprosthetic hip fracture with no displacement of the greater trochanter. No evidence of implant failure or loosening. Hip well reduced.  "

## 2021-07-20 NOTE — LETTER
[facility] UN[address]D OBSERVATION [address] [address]  [phone]    FACSIMILE TRANSMITTAL SHEET     From:  JEFF Mckeon, [facility] ED/Observation  [facility]  Phone: [phone]  Pager: [phone]    Confidentiality Notice: The document(s) accompanying this fax may contain protected health information under state and federal law and is legally privileged. The information is only for the use of the intended recipient named above. The recipient or person responsible for delivering this information is prohibited by law from disclosing this information without proper authorization to any other party, unless required to do so by law or regulation. If you are not the intended recipient, you are hereby notified that any review, dissemination, distribution, or copying of this message is strictly prohibited. If you have received this communication in error, please notify us immediately by telephone to arrange for the return or destruction of the faxed document. Thank you

## 2021-07-20 NOTE — PROGRESS NOTES
"Community Paramedic Program  CHW resource follow up    Received a call from Shayy Bailey at the [facility]. She said that pt called their nurse Jessica today and shared that she is in \"excrutiating pain\" and was asking if Jessica could come be with her. Shayy said they advised pt to call 911. She said that they wanted to make sure I was aware. I thanked her for the update and briefly summarized my visit with Elizabeth yesterday. Let Shayy know about pt's upcoming appointment on Thursday and that I've been in touch with her sister and friend Katherine.    Shayy asked that we continue to check in and coordinate our support for pt. I agreed and thanked her for reaching out.  "

## 2021-07-20 NOTE — LETTER
[facility] UNIT 6D OBSERVATION [address] [address]  [phone]    FACSIMILE TRANSMITTAL SHEET       From:  JEFF Mckeon, [facility] ED/Observation  [facility]  Phone: [phone]  Pager: [phone]    Confidentiality Notice: The document(s) accompanying this fax may contain protected health information under state and federal law and is legally privileged. The information is only for the use of the intended recipient named above. The recipient or person responsible for delivering this information is prohibited by law from disclosing this information without proper authorization to any other party, unless required to do so by law or regulation. If you are not the intended recipient, you are hereby notified that any review, dissemination, distribution, or copying of this message is strictly prohibited. If you have received this communication in error, please notify us immediately by telephone to arrange for the return or destruction of the faxed document. Thank you

## 2021-07-20 NOTE — TELEPHONE ENCOUNTER
DIAGNOSIS: minimally displaced greater trochanter fracture   APPOINTMENT DATE: 7.22.21   NOTES STATUS DETAILS   OFFICE NOTE from referring provider Internal 7.18.21 [facility]   OFFICE NOTE from other specialist Internal 8.1.19 SANK[facility]  7.9.19 [facility]  4.26.19 [facility]  1.25.19 [facility]  More in Epic if needed   DISCHARGE SUMMARY from hospital N/A    DISCHARGE REPORT from the ER N/A    OPERATIVE REPORT Internal 7.26.18 right hip hemiarthroplasty, [facility]   EMG report N/A    MEDICATION LIST Internal    MRI Internal 6.25.21 Pelvis  12.11.20 pelvis  6.8.20 pelvis  1.9.20 pelvis  More in Epic if needed   DEXA (osteoporosis/bone health) Internal 1.17.19 hip pelvis spine   CT SCAN Internal 8.28.20 chest ab pelvis  6.24.19 chest ab pelvis  2.22.19 ab pelvis  More in Epic if needed   XRAYS (IMAGES & REPORTS) Internal 7.18.21 Right hip  6.14.21 thoracic spine  6.14.21 lumbar spine  More in Epic if needed

## 2021-07-20 NOTE — ED NOTES
Bed: ED08  Expected date: 7/20/21  Expected time: 2:33 PM  Means of arrival: Ambulance  Comments:  [facility] [facility]  81F fell on 7/18/21 has hip pain

## 2021-07-21 ENCOUNTER — PATIENT OUTREACH (OUTPATIENT)
Dept: OTHER | Facility: CLINIC | Age: 81
End: 2021-07-21

## 2021-07-21 ENCOUNTER — TELEPHONE (OUTPATIENT)
Dept: ORTHOPEDICS | Facility: CLINIC | Age: 81
End: 2021-07-21

## 2021-07-21 ENCOUNTER — APPOINTMENT (OUTPATIENT)
Dept: PHYSICAL THERAPY | Facility: CLINIC | Age: 81
End: 2021-07-21
Attending: NURSE PRACTITIONER
Payer: MEDICARE

## 2021-07-21 LAB
ALBUMIN UR-MCNC: NEGATIVE MG/DL
ANION GAP SERPL CALCULATED.3IONS-SCNC: 4 MMOL/L (ref 3–14)
APPEARANCE UR: CLEAR
ATRIAL RATE - MUSE: 59 BPM
BILIRUB UR QL STRIP: NEGATIVE
BUN SERPL-MCNC: 20 MG/DL (ref 7–30)
CALCIUM SERPL-MCNC: 8.4 MG/DL (ref 8.5–10.1)
CHLORIDE BLD-SCNC: 111 MMOL/L (ref 94–109)
CO2 SERPL-SCNC: 27 MMOL/L (ref 20–32)
COLOR UR AUTO: ABNORMAL
CREAT SERPL-MCNC: 0.58 MG/DL (ref 0.52–1.04)
DIASTOLIC BLOOD PRESSURE - MUSE: NORMAL MMHG
ERYTHROCYTE [DISTWIDTH] IN BLOOD BY AUTOMATED COUNT: 13.7 % (ref 10–15)
GFR SERPL CREATININE-BSD FRML MDRD: 86 ML/MIN/1.73M2
GLUCOSE BLD-MCNC: 87 MG/DL (ref 70–99)
GLUCOSE UR STRIP-MCNC: NEGATIVE MG/DL
HCT VFR BLD AUTO: 36.5 % (ref 35–47)
HGB BLD-MCNC: 11.5 G/DL (ref 11.7–15.7)
HGB UR QL STRIP: NEGATIVE
INTERPRETATION ECG - MUSE: NORMAL
KETONES UR STRIP-MCNC: NEGATIVE MG/DL
LEUKOCYTE ESTERASE UR QL STRIP: NEGATIVE
MCH RBC QN AUTO: 32.6 PG (ref 26.5–33)
MCHC RBC AUTO-ENTMCNC: 31.5 G/DL (ref 31.5–36.5)
MCV RBC AUTO: 103 FL (ref 78–100)
MUCOUS THREADS #/AREA URNS LPF: PRESENT /LPF
NITRATE UR QL: NEGATIVE
P AXIS - MUSE: 83 DEGREES
PH UR STRIP: 6 [PH] (ref 5–7)
PLATELET # BLD AUTO: 178 10E3/UL (ref 150–450)
POTASSIUM BLD-SCNC: 3.6 MMOL/L (ref 3.4–5.3)
PR INTERVAL - MUSE: 148 MS
QRS DURATION - MUSE: 88 MS
QT - MUSE: 418 MS
QTC - MUSE: 413 MS
R AXIS - MUSE: 43 DEGREES
RBC # BLD AUTO: 3.53 10E6/UL (ref 3.8–5.2)
RBC URINE: 1 /HPF
SODIUM SERPL-SCNC: 142 MMOL/L (ref 133–144)
SP GR UR STRIP: 1.02 (ref 1–1.03)
SQUAMOUS EPITHELIAL: <1 /HPF
SYSTOLIC BLOOD PRESSURE - MUSE: NORMAL MMHG
T AXIS - MUSE: 70 DEGREES
UROBILINOGEN UR STRIP-MCNC: NORMAL MG/DL
VENTRICULAR RATE- MUSE: 59 BPM
WBC # BLD AUTO: 4.9 10E3/UL (ref 4–11)
WBC URINE: 2 /HPF

## 2021-07-21 PROCEDURE — 99224 PR SUBSEQUENT OBSERVATION CARE,LEVEL I: CPT | Performed by: EMERGENCY MEDICINE

## 2021-07-21 PROCEDURE — 97530 THERAPEUTIC ACTIVITIES: CPT | Mod: GP

## 2021-07-21 PROCEDURE — 85027 COMPLETE CBC AUTOMATED: CPT | Performed by: NURSE PRACTITIONER

## 2021-07-21 PROCEDURE — 36415 COLL VENOUS BLD VENIPUNCTURE: CPT | Performed by: NURSE PRACTITIONER

## 2021-07-21 PROCEDURE — 97116 GAIT TRAINING THERAPY: CPT | Mod: GP

## 2021-07-21 PROCEDURE — G0378 HOSPITAL OBSERVATION PER HR: HCPCS

## 2021-07-21 PROCEDURE — 250N000013 HC RX MED GY IP 250 OP 250 PS 637: Performed by: NURSE PRACTITIONER

## 2021-07-21 PROCEDURE — 97161 PT EVAL LOW COMPLEX 20 MIN: CPT | Mod: GP

## 2021-07-21 PROCEDURE — 80048 BASIC METABOLIC PNL TOTAL CA: CPT | Performed by: NURSE PRACTITIONER

## 2021-07-21 PROCEDURE — 81001 URINALYSIS AUTO W/SCOPE: CPT | Performed by: INTERNAL MEDICINE

## 2021-07-21 RX ORDER — ACETAMINOPHEN 325 MG/1
650 TABLET ORAL ONCE
Status: DISCONTINUED | OUTPATIENT
Start: 2021-07-21 | End: 2021-07-21

## 2021-07-21 RX ORDER — GABAPENTIN 300 MG/1
300 CAPSULE ORAL 3 TIMES DAILY
Status: DISCONTINUED | OUTPATIENT
Start: 2021-07-21 | End: 2021-07-22 | Stop reason: HOSPADM

## 2021-07-21 RX ORDER — NALOXONE HYDROCHLORIDE 0.4 MG/ML
0.4 INJECTION, SOLUTION INTRAMUSCULAR; INTRAVENOUS; SUBCUTANEOUS
Status: DISCONTINUED | OUTPATIENT
Start: 2021-07-21 | End: 2021-07-22 | Stop reason: HOSPADM

## 2021-07-21 RX ORDER — NALOXONE HYDROCHLORIDE 0.4 MG/ML
0.2 INJECTION, SOLUTION INTRAMUSCULAR; INTRAVENOUS; SUBCUTANEOUS
Status: DISCONTINUED | OUTPATIENT
Start: 2021-07-21 | End: 2021-07-22 | Stop reason: HOSPADM

## 2021-07-21 RX ORDER — METHOCARBAMOL 500 MG/1
500 TABLET, FILM COATED ORAL ONCE
Status: COMPLETED | OUTPATIENT
Start: 2021-07-21 | End: 2021-07-21

## 2021-07-21 RX ORDER — CYCLOBENZAPRINE HCL 5 MG
5 TABLET ORAL 3 TIMES DAILY
Status: DISCONTINUED | OUTPATIENT
Start: 2021-07-21 | End: 2021-07-22 | Stop reason: HOSPADM

## 2021-07-21 RX ORDER — OXYCODONE HYDROCHLORIDE 5 MG/1
5 TABLET ORAL EVERY 4 HOURS PRN
Status: DISCONTINUED | OUTPATIENT
Start: 2021-07-21 | End: 2021-07-22

## 2021-07-21 RX ORDER — TRAMADOL HYDROCHLORIDE 50 MG/1
50 TABLET ORAL EVERY 6 HOURS PRN
Status: DISCONTINUED | OUTPATIENT
Start: 2021-07-21 | End: 2021-07-21

## 2021-07-21 RX ADMIN — ACETAMINOPHEN 650 MG: 325 TABLET, FILM COATED ORAL at 17:51

## 2021-07-21 RX ADMIN — GABAPENTIN 200 MG: 100 CAPSULE ORAL at 08:19

## 2021-07-21 RX ADMIN — OXYCODONE HYDROCHLORIDE 2.5 MG: 5 TABLET ORAL at 13:21

## 2021-07-21 RX ADMIN — ACETAMINOPHEN 650 MG: 325 TABLET, FILM COATED ORAL at 23:05

## 2021-07-21 RX ADMIN — CYCLOBENZAPRINE 5 MG: 5 TABLET, FILM COATED ORAL at 21:05

## 2021-07-21 RX ADMIN — OXYCODONE HYDROCHLORIDE 2.5 MG: 5 TABLET ORAL at 08:25

## 2021-07-21 RX ADMIN — ACETAMINOPHEN 650 MG: 325 TABLET, FILM COATED ORAL at 12:02

## 2021-07-21 RX ADMIN — OXYCODONE HYDROCHLORIDE 5 MG: 5 TABLET ORAL at 14:34

## 2021-07-21 RX ADMIN — CYCLOBENZAPRINE 5 MG: 5 TABLET, FILM COATED ORAL at 14:33

## 2021-07-21 RX ADMIN — METHOCARBAMOL 500 MG: 500 TABLET ORAL at 03:41

## 2021-07-21 RX ADMIN — METHOCARBAMOL 500 MG: 500 TABLET ORAL at 12:03

## 2021-07-21 RX ADMIN — Medication 125 MCG: at 08:19

## 2021-07-21 RX ADMIN — GABAPENTIN 300 MG: 300 CAPSULE ORAL at 21:05

## 2021-07-21 RX ADMIN — ACETAMINOPHEN 650 MG: 325 TABLET, FILM COATED ORAL at 03:44

## 2021-07-21 NOTE — PLAN OF CARE
-diagnostic tests and consults completed and resulted. No  -vital signs normal or at patient baseline. Yes  -adequate pain control on oral analgesics. Yes  -safe disposition plan has been identified. No

## 2021-07-21 NOTE — PLAN OF CARE
"Observation Goals:   -diagnostic tests and consults completed and resulted. Met   -vital signs normal or at patient baseline. Met  -adequate pain control on oral analgesics. Met   -safe disposition plan has been identified. Met. Pt acceptd at [facility]. SW will arrange ride.     /83 (BP Location: Right arm)   Pulse 71   Temp 97.7  F (36.5  C) (Oral)   Resp 18   Ht 1.626 m (5' 4\")   Wt 48.5 kg (106 lb 14.8 oz)   LMP  (LMP Unknown)   SpO2 95%   BMI 18.35 kg/m             "

## 2021-07-21 NOTE — PLAN OF CARE
[facility]      OUTPATIENT PHYSICAL THERAPY EVALUATION  PLAN OF TREATMENT FOR OUTPATIENT REHABILITATION  (COMPLETE FOR INITIAL CLAIMS ONLY)  Patient's Last Name, First Name, M.I.  YOB: 1940  Elizabeth Taylor                        Provider's Name  [facility] Medical Record No.  [id number]                               Onset Date:  07/20/21   Start of Care Date:  07/21/21      Type:     _X_PT   ___OT   ___SLP Medical Diagnosis:  R hip fx                        PT Diagnosis:  impaired functional mobility   Visits from SOC:  1   _________________________________________________________________________________  Plan of Treatment/Functional Goals    Planned Interventions: balance training, bed mobility training, gait training, patient/family education, stair training, strengthening, transfer training, progressive activity/exercise, risk factor education     Goals: See Physical Therapy Goals on Care Plan in [facility] electronic health record.    Therapy Frequency: 3x/week (under OBS status)  Predicted Duration of Therapy Intervention: 3-5 days  _________________________________________________________________________________    I CERTIFY THE NEED FOR THESE SERVICES FURNISHED UNDER        THIS PLAN OF TREATMENT AND WHILE UNDER MY CARE     (Physician co-signature of this document indicates review and certification of the therapy plan).              Certification date from: 07/21/21, Certification date to: 07/26/21    Referring Physician: Heather Liz CNP            Initial Assessment        See Physical Therapy evaluation dated 07/21/21 in Epic electronic health record.

## 2021-07-21 NOTE — PLAN OF CARE
Observation Goals:     -diagnostic tests and consults completed and resulted. Not met, PT/OT evaluation AM, SW consulted for AM   -vital signs normal or at patient baseline. Met, (T): 98.2 oral, (BP): 123/71, 76bpm, (RR): 16.   -adequate pain control on oral analgesics. Met, C/o muscle spasms. PRN tylenol and robaxin given.   -safe disposition plan has been identified. Not Met.

## 2021-07-21 NOTE — PROGRESS NOTES
"Community Paramedic Program  Community Health Worker Follow Up    Intervention and Education during outreach:     Received an email from pt's friend Katherine. She wrote that \"Elizabeth called me this morning around 10 am and wanted to call 911.\" Katherine contacted pt's sister Sahil to notify her and went over to pt's apartment \"to help her get ready and be with her when she called.\" Katherine said when the ambulance arrived, she gave them information and paperwork from pt's Urgent Care visit over the weekend. She said they took pt to [facility] the [place] and \"Sahil ended up meeting us there.\" She wrote that Sahil \"has been with Elizabeth at the hospital and is helping to navigate her care there.\"     Received a voicemail from pt's sister earlier this morning. She said \"I apologize for calling you at this hour, but I just wanted to make sure you were aware that Elizabeth has been admitted to the hospital.\" She encouraged me to reach out with questions and said \"we will let you know what the plan is and what they are recommending for Elizabeth.\"     I responded to Katherine and Sahil by email. Thanked them for the updates and reaching out. I let them know I got a note notifying me of Elizabeth's hospital admission and that I will continue to check her chart in the coming days to see what the inpatient hospital staff are planning in regards to Elizabeth's discharge. Also let them know that I notified pt's PCP about what happened and pt's admission. I offered my support and encouraged them to reach out to me if needed.    CHW Plan:   1. Pt's sister and friend will continue supporting pt to navigate her care while she is admitted in the hospital.  2. Pt's sister and friend will contact the CP CHW with updates or questions.  3. CP CHW will continue checking pt's chart for updates on pt's care and discharge plan.    "

## 2021-07-21 NOTE — TELEPHONE ENCOUNTER
Thanks for the update, [place]. Looks like she Elizabeth is in the hospital receiving good care. Appreciate your efforts.

## 2021-07-21 NOTE — PROGRESS NOTES
Brief orthopedic note:    Complete consult note to follow    Elizabeth Taylor is an 81-year-old female with history of right hip hemiarthroplasty who presents with lateral right hip pain after fall approximately 2 days ago.  Advanced imaging demonstrates a [place] a periprosthetic femur fracture.    Plan:  -No plan for operative intervention  -Given the patient's recent history of falls and current tenuous independent living status she would likely benefit from PT OT and likely admission to TCU.  Discussed ED provider, patient may be held in ED observation unit for subsequent PT OT assessment versus admitted to the medicine team for therapies and TCU placement.    Patient agreement plan all questions answered.    Staff: Dr. Sylvester Brown MD  Orthopaedic Surgery PGY4  Pager: [phone]

## 2021-07-21 NOTE — H&P
"[facility]    History and Physical - ED Observation       Date of Admission:  7/20/2021    Assessment & Plan      Elizabeth Taylor is a 81 year old female admitted on 7/20/2021. She a past medical history significant for Anal ca s/p RT who presents to the Emergency Department for evaluation of right hip pain 2/2 mechanical fall on Saturday (7/17).      # Fall  # Right periprosthetic hip fracture  Patient reports that she was standing up in her kitchen on Saturday to grab a snack when she had a mechanical fall backwards at approximately 3 AM on Sunday. She has tried to manage the pain as an outpatient but it has become unbearable. XRays were done at the direction of her PCP and they showed \"suspicion of fracture\" of right hip. In the ED, VSS. CT Hip shows periprosthetic fracture of right hip. Medications: none in ED. Othopedics evaluated patient and advise that she can be weight bearing as tolerated with assist, and have a regular diet as the plan is not for any surgical intervention. Plan: Admit to ED Observation for PT/OT evaluation and likely placement in a rehab facility.  Pt reports falling on Sunday around 3am. She   -[place] to ED Observation  -VS Q4hrs  -Tylenol scheduled  -Gabapentin scheduled BID  -Robaxin scheduled QID  -Aqua K pump for heat therapy  -Lidoderm patch  -Ultram PRN  -Miralax daily    # Disposition planning  Patient has been working with case management and home health nursing to try to stay in her home. Per chart review, \"there is a lot of clutter\" in the home. Patient reports she feels like she cannot remember things lately and feels very scattered.   -PT evaluation  -OT evaluation, please do [facility]  -Social work consult    Chronic Medical Conditions  #Hx of mT2N1 anal canal squamous cell carcinoma (s/p CXRT [5,400 cGy], completed on 3/23/18)  Last flex sigmoidoscopy was 6/2021. Mild radiation proctitis. No other abnormality. PET/CT in 8/2021 " "and next colonoscopy in 2022.      Diet: Regular Diet Adult    DVT Prophylaxis: Low Risk/Ambulatory with no VTE prophylaxis indicated  Joiner Catheter: Not present  Central Lines: None  Code Status:   full    Disposition Plan   Expected discharge:  1-2 days recommended to transitional care unit once adequate pain management/ tolerating PO medications and safe disposition plan/ TCU bed available.     The patient's care was discussed with the Bedside Nurse, Patient and ED MD, Dr Shetty.    Heather Liz, [facility]  ED Observation, Ascom:[id number]  ______________________________________________________________________    Chief Complaint   Fall    History is obtained from the patient    History of Present Illness   Per ED, \"Elizabeth Taylor is a 81 year old female with a past medical history significant for Anal ca s/p RT who presents to the Emergency Department for evaluation of right hip pain 2/2 mechanical fall on Saturday (7/17).  Patient reports that she was standing up in her kitchen on Saturday to grab a snack when she had a mechanical fall backwards at approximately 3 AM.  She states she developed severe right hip pain, and was down on the floor until 4 AM.  She was noted to have went to an urgent care the following day with her friend.  During this visit she did undergo an x-ray of the right hip which showed suspicion for nondisplaced right femur greater trochanter periprosthetic fracture.  Full impression noted below.  Patient has had ongoing pain since this visit, and presents here to the ED for further evaluation.     XR RIGHT HIP TWO-THREE VIEWS  7/18/2021   IMPRESSION:  1.  Findings suspicious for nondisplaced right femur greater  trochanter periprosthetic fracture. There is a suggestion of a  transverse lucency in this region on the frontal view and oblique  lucency in this region on the frog leg lateral view. No additional  areas suspicious for fracture. " "This could be further evaluated with CT  if clinically indicated.  2.  Right hip hemiarthroplasty with proximal femur and a prosthesis.  The component is well seated. No evidence of loosening or subsidence.  3.  Normal joint alignment.\"    Review of Systems    Constitutional: Negative for fever.   HENT: Negative for congestion.    Eyes: Negative for redness.   Respiratory: Negative for shortness of breath.    Cardiovascular: Negative for chest pain.   Gastrointestinal: Negative for abdominal pain.   Genitourinary: Negative for difficulty urinating.   Musculoskeletal: Negative for arthralgias and neck stiffness.        R-hip pain   Skin: Negative for color change.   Neurological: Negative for headaches.   Psychiatric/Behavioral: Negative for confusion.   All other systems reviewed and are negative.    Past Medical History    I have reviewed this patient's medical history and updated it with pertinent information if needed.   Past Medical History:   Diagnosis Date     Anal cancer (H)      Endometriosis, site unspecified      Thyroiditis, unspecified     Thryoiditis-resolved       Past Surgical History   I have reviewed this patient's surgical history and updated it with pertinent information if needed.  Past Surgical History:   Procedure Laterality Date     APPENDECTOMY      as a child     ARTHROPLASTY HIP Right 7/26/2018    Procedure: ARTHROPLASTY HIP;  Right Hip Hemiarthroplasty;  Surgeon: Zaire Phan MD;  Location: [facility] OR     COLONOSCOPY N/A 4/13/2017    Procedure: COLONOSCOPY;  Surgeon: Juan Dos Santos MD;  Location: [facility] GI     INSERT PORT VASCULAR ACCESS Right 2/8/2018    Procedure: INSERT PORT VASCULAR ACCESS;  Place Single Lumen Venous Chest Port Placement;  Surgeon: Zaire Moreira PA-C;  Location: [facility] OR     SURGICAL HISTORY OF -       endometriosis       Social History   I have reviewed this patient's social history and updated it with pertinent information if needed.  Social " History     Tobacco Use     Smoking status: Never Smoker     Smokeless tobacco: Never Used   Substance Use Topics     Alcohol use: No     Drug use: No       Family History   I have reviewed this patient's family history and updated it with pertinent information if needed.  Family History   Problem Relation Age of Onset     Cancer Sister      Cancer Maternal Grandmother         lymphoma     Heart Disease Father         MI     Uterine Cancer Niece        Prior to Admission Medications   Prior to Admission Medications   Prescriptions Last Dose Informant Patient Reported? Taking?   Calcium-Magnesium-Zinc 333-133-5 MG TABS per tablet   Yes No   Sig: Take 1 tablet by mouth daily   VITAMIN E PO   Yes No   Sig: Take 1 capsule by mouth daily   acetaminophen (TYLENOL) 500 MG tablet   Yes No   Sig: Take 1,000 mg by mouth 3 times daily   cholecalciferol (VITAMIN D3) 5000 units (125 mcg) capsule   No No   Si CAP BY MOUTH DAILY (DX: OSTEOPOROSIS)   conjugated estrogens (PREMARIN) 0.625 MG/GM vaginal cream   No No   Sig: Apply locally to clitoral randall pea sized amount daily for 2 weeks then prn.   Patient not taking: Reported on 2021   docusate sodium (COLACE) 100 MG capsule   No No   Sig: Take 1 capsule (100 mg) by mouth 2 times daily   Patient not taking: Reported on 2021   estradiol (ESTRACE) 0.1 MG/GM vaginal cream   No No   Sig: Apply locally to clitoral randall pea sized amount daily for 2 weeks then prn.   Patient not taking: Reported on 2021   gabapentin (NEURONTIN) 100 MG capsule   No No   Sig: TAKE ONE TO FOUR CAPSULES AT BEDTIME AS NEEDED FOR MUSCLE SPASMS   metroNIDAZOLE (METROGEL) 0.75 % external gel   No No   Sig: Apply topically 2 times daily   vitamin B complex with vitamin C (VITAMIN  B COMPLEX) TABS tablet   Yes No   Sig: Take 1 tablet by mouth daily      Facility-Administered Medications Last Administration Doses Remaining   heparin 100 UNIT/ML injection 500 Units None recorded 1        Allergies    Allergies   Allergen Reactions     Darvon [Propoxyphene]      Had reaction in teen years     Ketoconazole Rash     Penicillins      As a child       Physical Exam   Vital Signs: Temp: 98.9  F (37.2  C) Temp src: Oral BP: 124/73 Pulse: 70   Resp: 14 SpO2: 98 % O2 Device: None (Room air)    Weight: 106 lbs 14.77 oz    Constitutional: awake, alert, cooperative, no apparent distress, and appears stated age  Eyes: Lids and lashes normal, pupils equal, round and reactive to light, extra ocular muscles intact, sclera clear, conjunctiva normal  ENT: Normocephalic, atraumatic, external ears without lesions, oral pharynx with moist mucous membranes, gums normal and good dentition.  Respiratory: No increased work of breathing, good air exchange, clear to auscultation bilaterally, no crackles or wheezing  Cardiovascular: Normal apical impulse, regular rate and rhythm, normal S1 and S2, no S3 or S4, and no murmur noted  Abdomen: Normal bowel sounds, soft, non-distended, non-tender  Skin: normal skin color, texture, turgor  Musculoskeletal: There is tenderness to palpation of the right hip. Range of motion is limited by pain.  Motor strength is 5 out of 5 all extremities bilaterally.  Tone is normal.  Neurologic: Awake, alert, oriented to name, place and time.  Cranial nerves II-XII are grossly intact.    Psychiatric: Mood normal. Behavior is manic. Thoughts are tangential.     Data   EKG  Interpreted by Aristides Shetty MD, MD  Time reviewed: on arrival  Symptoms at time of EKG: right hip pain   Rhythm: normal sinus   Rate: normal  Axis: normal  Ectopy: none  Conduction: normal  ST Segments/ T Waves: No ST-T wave changes  Q Waves: none  Comparison to prior: Unchanged from 7/2018     Clinical Impression: normal EKG    Labs are reviewed by me. EKG is reviewed by me. CT hip right is reviewed by me, showing comminuted nondisplaced fracture of the greater trochanter in the setting of right hip hemiarthroplasty.    Recent Labs   Lab  07/20/21  1608   WBC 5.1   HGB 10.7*         INR 1.02      POTASSIUM 3.8   CHLORIDE 108   CO2 30   BUN 22   CR 0.64   ANIONGAP 1*   ELISE 9.1   GLC 71   ALBUMIN 3.6   PROTTOTAL 6.9   BILITOTAL 1.2   ALKPHOS 83   ALT 65*   AST 31     Recent Results (from the past 24 hour(s))   CT Hip Right w/o Contrast    Narrative    EXAM: CT HIP RIGHT W/O CONTRAST  7/20/2021 4:33 PM      HISTORY: Fracture, hip    COMPARISON: Radiographs 7/18/2021    TECHNIQUE: CT imaging of the right hip without IV contrast.  Multiplanar reconstructions.    FINDINGS:      images demonstrates right hip hemiarthroplasty. Degenerative  changes of the left hip. Fracture through the right greater trochanter  better demonstrated on CT images.    Similar to recent radiographs, there is a comminuted, nondisplaced  fracture of the greater tuberosity in this patient status post right  hip hemiarthroplasty. No substantial change compared to recent  radiographs. Bony detail is compromised by metallic beam hardening  artifact. No substantial distal fracture extension.    Right sacroiliac joint degenerative change. Partially visualized  lumbar spondylosis.    Fatty replacement of the gluteus minimus. Cholelithiasis.       Impression    IMPRESSION:     1. Similar to recent radiographs, comminuted nondisplaced fracture of  the greater trochanter in the setting of right hip hemiarthroplasty.    2. Cholelithiasis.    ANGELA HORNE MD (Joe)         SYSTEM ID:  [id number]

## 2021-07-21 NOTE — PROGRESS NOTES
"ED Observation Progress Note  [facility]  Note Date: 7/21/2021    Elizabeth Taylor MRN: [id number]   Age: 81 year old YOB: 1940     Interval History   -82 yo female who was admitted for fall 3 days prior and subsequent periprosthetic fracture of right hip.  Pt currently has no pain.  Only has pain with ambulation.      Physical Exam   /81 (BP Location: Right arm)   Pulse 75   Temp 98.3  F (36.8  C) (Oral)   Resp 18   Ht 1.626 m (5' 4\")   Wt 48.5 kg (106 lb 14.8 oz)   LMP  (LMP Unknown)   SpO2 95%   BMI 18.35 kg/m    Physical Exam    Results   All laboratory data reviewed  Lab Results   Component Value Date    WBC 4.9 07/21/2021    HGB 11.5 (L) 07/21/2021    HCT 36.5 07/21/2021     07/21/2021     07/21/2021    POTASSIUM 3.6 07/21/2021    CHLORIDE 111 (H) 07/21/2021    CO2 27 07/21/2021    BUN 20 07/21/2021    CR 0.58 07/21/2021    GLC 87 07/21/2021    SED 8 06/14/2021    TROPI 0.025 03/19/2018    AST 31 07/20/2021    ALT 65 (H) 07/20/2021    ALKPHOS 83 07/20/2021    BILITOTAL 1.2 07/20/2021    RADHA 36 03/17/2018    INR 1.02 07/20/2021      -   I personally reviewed these imaging films.  A formal report from radiology will follow.     Assessments & Plan (with Medical Decision Making)   Elizabeth Taylor is a 81 year old female admitted to the ED Observation Unit with right sided non surgical hip fracture. . Pt was seen by orthopedic who recommends non surgical care and weight bearing as tolerated.    Pt was seen by PT and still has pain with ambulation.   No other traumatic injuries.  Plan for TCU placement.     Services consulted during the observation course: social work. Notable consultant recommendations include: N/A    Observation goals to be met before discharge home:      --  Siena Cobb MD  [facility] UNIT 6D OBSERVATION [place]  7/21/2021       "

## 2021-07-21 NOTE — CONSULTS
Care Management Follow Up    Length of Stay (days): 0    Expected Discharge Date:    Expected Time of Departure: TBD  Concerns to be Addressed: discharge planning     Patient plan of care discussed at interdisciplinary rounds: Yes    Anticipated Discharge Disposition: Transitional Care     Patient/family educated on Medicare website which has current facility and service quality ratings: yes  Education Provided on the Discharge Plan:    Patient/Family in Agreement with the Plan: yes    Referrals Placed by CM/SW:  Referrals have been made to the following facilities and their status is as follows:    [facility]  P: [phone]  P: [phone] - Admissions  F: [phone]  - Writer spoke with Jada in admissions, who reports they can review and assess pt. Writer e-faxed referral for admissions to review.  - Pt selected another facility. See addendum. Writer has ceased following this referral.    [facility] - Accepted  [address] [place]  [address]  P: [phone]  P: [phone] - Admissions  F: [phone]  - Writer spoke with Wilma in admissions, who reports they can review and assess pt. Writer e-faxed referral for admissions to review.  - Writer spoke with June in admissions. SNF has clinically accepted pt to a private room/private bath (no fee) for tomorrow.    [facility]  [address] [place]  [address]  P: [phone]  P: [phone] - Admissions  F: [phone]  - Writer left VM with SNF admissions. Writer preemptively faxed referral packet for SNF to review.    [facility]  [address] [place]  [address]  P: [phone]  F: [phone]  - Writer spoke with Natividad in admissions, who reports they can review and assess pt. Writer e-faxed referral for admissions to review.    [facility]  [address], [place] [address]  P: [phone]  F: [phone]  - Writer left VM with SNF admissions.  Writer preemptively faxed referral  packet for SNF to review.    Private pay costs discussed: private room/amenity fees and transportation costs    Additional Information:  Elizabeth Taylor is a 81 year old female admitted on 7/20/2021. She a past medical history significant for Anal ca s/p RT who presents to the Emergency Department for evaluation of right hip pain 2/2 mechanical fall on Saturday (7/17).       Chart reviewed. Case discussed with bedside RN and medical provider.    Writer met with pt. Writer introduced self, discussed role and went over writer's availability. Writer met with pt to discuss and complete LEO paperwork. Writer answered any of pt's questions. Pt signed LEO form and a copy was placed on pt's chart.    Writer discussed discharge recommendations from PT. Writer provided pt a list of SNFs from Medicare.gov. Discussed SNF options and made referrals (see above).    Writer updated pt on her acceptance to [facility] home. Pt is agreeable to going to [facility] so long as [facility] cannot accept her.     Writer received call from pt's sister Sahil, who had pt's friend Katherine on the phone with her. Discussed anticipated discharge plan for pt. Sahil and Katherine had several questions regarding coverage and cost. Writer explained medicare coverage for TCU and how pt could apply for MA if pt requires LTC. Sahil and Katherine report pt has very limited finances and may need MA in the future. Sahil and Katherine are agreeable to pt going to [facility] if pt is agreeable.    ADDENDUM 1710:  Writer spoke with Jada in admissions at [facility]. SNF could offer pt a shared room. Pt reports she would not be interested in a shared room and would now [facility]. SW to f/u with pt tomorrow to confirm transportation to SNF.    ADDENDUM 0424:   Writer met with pt and pt's friend Katherine and discussed the discharge plan for tomorrow. Discussed medical transportation. Pt reports she would like her  friend, Katherine, to transport her there, but Katherine states she is unavailable until late in the evening. Writer brought up how pt is requiring significant assistance to move and is in pain when being moved. Writer recommended medical transportation for pt. Pt is agreeable to medical transportation being arranged for her at discharge and is aware of the costs. SW to arrange transportation tomorrow morning.  ________________    JEFF Mckeon, [facility]  ED/Observation   [facility]  Phone: [phone]  Pager: [phone]  Fax: [phone]    On-call pager, [phone], 4:00pm to midnight

## 2021-07-21 NOTE — PLAN OF CARE
Observation Goals:    -diagnostic tests and consults completed and resulted. Not met  -vital signs normal or at patient baseline. Met  -adequate pain control on oral analgesics. Met  -safe disposition plan has been identified. Not Met

## 2021-07-21 NOTE — PROGRESS NOTES
"   07/21/21 1000   Quick Adds   Type of Visit Initial PT Evaluation   Living Environment   People in home alone   Current Living Arrangements apartment   Home Accessibility stairs to enter home   Number of Stairs, Main Entrance 8   Stair Railings, Main Entrance railings on both sides of stairs   Transportation Anticipated family or friend will provide   Living Environment Comments tub/shower at home, [address] apartment then all needs met on main floor   Self-Care   Usual Activity Tolerance good   Current Activity Tolerance fair   Regular Exercise No   Equipment Currently Used at Home walker, rolling   Activity/Exercise/Self-Care Comment pt typically IND in all cares and mobility, fairly sedentary recently but notes used to walk 1 mile 5/week   Disability/Function   Hearing Difficulty or Deaf no   Wear Glasses or Blind yes   Vision Management glasses   Difficulty Communicating no   Difficulty Eating/Swallowing no   Walking or Climbing Stairs Difficulty no   Dressing/Bathing Difficulty no   Toileting issues no   Fall history within last six months yes   Change in Functional Status Since Onset of Current Illness/Injury yes   General Information   Onset of Illness/Injury or Date of Surgery 07/20/21   Referring Physician Heather Liz, CNP   Patient/Family Therapy Goals Statement (PT) \"get stronger and go home\"   Pertinent History of Current Problem (include personal factors and/or comorbidities that impact the POC) per chart review, \"Elizabeth Taylor is a 81 year old female admitted on 7/20/2021. She a past medical history significant for Anal ca s/p RT who presents to the Emergency Department for evaluation of right hip pain 2/2 mechanical fall on Saturday (7/17).\" Femur fx diagnosed per ortho from fall   Existing Precautions/Restrictions fall   Weight-Bearing Status - RLE weight-bearing as tolerated   General Observations activity: up with assist   Cognition   Affect/Mental Status (Cognition) anxious   Follows " Commands (Cognition) WNL   Behavioral Issues overwhelmed easily   Safety Deficit (Cognition) judgment;problem-solving   Cognitive Status Comments hyper-verbal, easily overhwelmed but re-directable   Pain Assessment   Patient Currently in Pain Yes, see Vital Sign flowsheet   Integumentary/Edema   Integumentary/Edema Comments brown, dry skin B LEs, 1+ edema   Posture    Posture Kyphosis;Forward head position;Protracted shoulders   Posture Comments severe L neck flexion/rotation   Range of Motion (ROM)   ROM Comment limited by pain on R LE, other extremeties WFL   Strength   Strength Comments NT formally on R 2/2 pain, L LE WFL per functional mobility   Bed Mobility   Comment (Bed Mobility) SBA supine>sit, extremely slow, pt must use UEs to manage R LE advancement, vc for postioning/technique (tx)   Transfers   Transfer Safety Comments SBA STS to bedrail support- pt declining FWW initially, heavy L LE weightbearing noted   Gait/Stairs (Locomotion)   Comment (Gait/Stairs) unable to assess formal gait- pt initially unable to lift R LE for stepping, only pivoting on feet   Balance   Balance Comments good standing balance at FWW, no LOB throughout session with B UE support   Sensory Examination   Sensory Perception patient reports no sensory changes   Clinical Impression   Criteria for Skilled Therapeutic Intervention yes, treatment indicated   PT Diagnosis (PT) impaired functional mobility   Influenced by the following impairments R LE weakness, pain, reduced activity tolerance   Functional limitations due to impairments bed mobility, transfers, gait, stairs, return to IND ADLs and living alone   Clinical Presentation Stable/Uncomplicated   Clinical Presentation Rationale PMH, clinician impression   Clinical Decision Making (Complexity) low complexity   Therapy Frequency (PT) 3x/week  (under OBS status)   Predicted Duration of Therapy Intervention (days/wks) 3-5 days   Planned Therapy Interventions (PT) balance  training;bed mobility training;gait training;patient/family education;stair training;strengthening;transfer training;progressive activity/exercise;risk factor education   Risk & Benefits of therapy have been explained evaluation/treatment results reviewed;care plan/treatment goals reviewed;risks/benefits reviewed;current/potential barriers reviewed;participants voiced agreement with care plan;participants included;patient   Clinical Impression Comments see PT DC planner below   Therapy Certification   Start of care date                                                  7/21/21   Certification date from                                            7/21/21   Certification date to                                               7/26/21   Medical Diagnosis                                                 R hip fx   PT Discharge Planning    PT Discharge Recommendation (DC Rec) Transitional Care Facility   PT Rationale for DC Rec pt unable to return home at this time due to mobility impairements post femur fx from fall. Is SBA with mobility though extremely slow not functionally ambulatory at this time. Would benefit from TCU stay to progress gait, stairs, transfers needed for eventual return home.   PT Brief overview of current status  Ax1 with FWW- does best managing R LE herself, steady with walker but very slow   Total Evaluation Time   Total Evaluation Time (Minutes) 8

## 2021-07-21 NOTE — PROGRESS NOTES
Observation Goals:     -diagnostic tests and consults completed and resulted. Not met, PT/OT evaluation AM, SW consulted for AM   -vital signs normal or at patient baseline. Met  -adequate pain control on oral analgesics. Not met, new order for oxycodone 2.5mg   -safe disposition plan has been identified. Not Met.

## 2021-07-21 NOTE — PROGRESS NOTES
-diagnostic tests and consults completed and resulted. Not met  -vital signs normal or at patient baseline. Met  -adequate pain control on oral analgesics. Met   -safe disposition plan has been identified. Not Met.

## 2021-07-21 NOTE — PROGRESS NOTES
"[facility]    Medicine Progress Note - Emergency Department Observation Unit       Date of Admission:  7/20/2021    Assessment & Plan         Elizabeth Taylor is a 81 year old female admitted on 7/20/2021. She a past medical history significant for Anal ca s/p RT who presents to the Emergency Department for evaluation of right hip pain 2/2 mechanical fall on Saturday (7/17).       # Fall  # Right periprosthetic hip fracture  Pt reports falling on Sunday around 3am. Patient reports that she was standing up in her kitchen on Saturday to grab a snack when she had a mechanical fall backwards at approximately 3 AM on Sunday. She has tried to manage the pain as an outpatient but it has become unbearable. XRays were done at the direction of her PCP and they showed \"suspicion of fracture\" of right hip. In the ED, VSS. CT Hip shows periprosthetic fracture of right hip.  Othopedics evaluated patient and advise that she can be weight bearing as tolerated with assist, and have a regular diet as the plan is not for any surgical intervention.  -VS Q4hrs  -Tylenol scheduled  -Gabapentin scheduled BID  - Flexeril scheduled QID  -Aqua K pump for heat therapy  -Lidoderm patch  -Oxycodone PRN  -Miralax daily  -WBAT RLE with walker  -Follow-up: Dr. Phan 6 weeks for repeat XR R hip     # Disposition planning  Patient has been working with case management and home health nursing to try to stay in her home. Per chart review, \"there is a lot of clutter\" in the home. Patient reports she feels like she cannot remember things lately and feels very scattered.  PT recommended TCU  -OT evaluation, please do [facility]  -Social work consult     Chronic Medical Conditions  #Hx of mT2N1 anal canal squamous cell carcinoma (s/p CXRT [5,400 cGy], completed on 3/23/18)  Last flex sigmoidoscopy was 6/2021. Mild radiation proctitis. No other abnormality. PET/CT in 8/2021 and next colonoscopy in 2022. "         Diet: Regular Diet Adult    DVT Prophylaxis: Low Risk/Ambulatory with no VTE prophylaxis indicated  Joiner Catheter: Not present  Central Lines: None  Code Status:   full     Disposition Plan   Expected discharge: Tomorrow recommended to transitional care unit once safe disposition plan/ TCU bed available.     The patient's care was discussed with the Attending Physician, Dr. Cobb, Bedside Nurse and Patient.    GRAHAM Lord CNP        ______________________________________________________________________    Interval History   Admitted overnight     Data reviewed today: I reviewed all medications, new labs and imaging results over the last 24 hours.     Physical Exam   Vital Signs: Temp: 98.2  F (36.8  C) Temp src: Oral BP: 111/72 Pulse: 76   Resp: 16 SpO2: 99 % O2 Device: None (Room air)    Weight: 106 lbs 14.77 oz  Constitutional: awake, alert, cooperative, no apparent distress, and appears stated age  Eyes: Lids and lashes normal, pupils equal, round and reactive to light, extra ocular muscles intact, sclera clear, conjunctiva normal  ENT: Normocephalic, atraumatic, external ears without lesions, oral pharynx with moist mucous membranes, gums normal and good dentition.  Respiratory: No increased work of breathing, good air exchange, clear to auscultation bilaterally, no crackles or wheezing  Cardiovascular: Normal apical impulse, regular rate and rhythm, normal S1 and S2, no S3 or S4, and no murmur noted  Abdomen: Normal bowel sounds, soft, non-distended, non-tender  Skin: normal skin color, texture, turgor  Musculoskeletal: There is tenderness to palpation of the right hip. Range of motion is limited by pain.  Motor strength is 5 out of 5 all extremities bilaterally.  Tone is normal.  Neurologic: Awake, alert, oriented to name, place and time.  Cranial nerves II-XII are grossly intact.    Psychiatric: Mood normal. Behavior is manic. Thoughts are tangential.        Data   Recent Labs   Lab  07/21/21  0611 07/20/21  1608   WBC 4.9 5.1   HGB 11.5* 10.7*   * 100    150   INR  --  1.02    139   POTASSIUM 3.6 3.8   CHLORIDE 111* 108   CO2 27 30   BUN 20 22   CR 0.58 0.64   ANIONGAP 4 1*   ELISE 8.4* 9.1   GLC 87 71   ALBUMIN  --  3.6   PROTTOTAL  --  6.9   BILITOTAL  --  1.2   ALKPHOS  --  83   ALT  --  65*   AST  --  31     Recent Results (from the past 24 hour(s))   CT Hip Right w/o Contrast    Narrative    EXAM: CT HIP RIGHT W/O CONTRAST  7/20/2021 4:33 PM      HISTORY: Fracture, hip    COMPARISON: Radiographs 7/18/2021    TECHNIQUE: CT imaging of the right hip without IV contrast.  Multiplanar reconstructions.    FINDINGS:      images demonstrates right hip hemiarthroplasty. Degenerative  changes of the left hip. Fracture through the right greater trochanter  better demonstrated on CT images.    Similar to recent radiographs, there is a comminuted, nondisplaced  fracture of the greater tuberosity in this patient status post right  hip hemiarthroplasty. No substantial change compared to recent  radiographs. Bony detail is compromised by metallic beam hardening  artifact. No substantial distal fracture extension.    Right sacroiliac joint degenerative change. Partially visualized  lumbar spondylosis.    Fatty replacement of the gluteus minimus. Cholelithiasis.       Impression    IMPRESSION:     1. Similar to recent radiographs, comminuted nondisplaced fracture of  the greater trochanter in the setting of right hip hemiarthroplasty.    2. Cholelithiasis.    ANGELA HORNE MD (Joe)         SYSTEM ID:  [id number]     Medications       acetaminophen  650 mg Oral Q6H     cholecalciferol  125 mcg Oral Daily     gabapentin  200 mg Oral BID     lidocaine  2 patch Transdermal Q24h    And     lidocaine   Transdermal Q8H     methocarbamol  500 mg Oral 4x Daily     polyethylene glycol  17 g Oral Daily     sodium chloride (PF)  3 mL Intracatheter Q8H

## 2021-07-22 ENCOUNTER — PRE VISIT (OUTPATIENT)
Dept: ORTHOPEDICS | Facility: CLINIC | Age: 81
End: 2021-07-22

## 2021-07-22 VITALS
HEART RATE: 77 BPM | DIASTOLIC BLOOD PRESSURE: 77 MMHG | BODY MASS INDEX: 18.25 KG/M2 | TEMPERATURE: 98 F | HEIGHT: 64 IN | SYSTOLIC BLOOD PRESSURE: 112 MMHG | RESPIRATION RATE: 18 BRPM | OXYGEN SATURATION: 98 % | WEIGHT: 106.92 LBS

## 2021-07-22 PROCEDURE — 250N000013 HC RX MED GY IP 250 OP 250 PS 637: Performed by: NURSE PRACTITIONER

## 2021-07-22 PROCEDURE — 999N000111 HC STATISTIC OT IP EVAL DEFER: Performed by: OCCUPATIONAL THERAPIST

## 2021-07-22 PROCEDURE — 99217 PR OBSERVATION CARE DISCHARGE: CPT | Performed by: NURSE PRACTITIONER

## 2021-07-22 PROCEDURE — G0378 HOSPITAL OBSERVATION PER HR: HCPCS

## 2021-07-22 RX ORDER — GABAPENTIN 300 MG/1
300 CAPSULE ORAL 3 TIMES DAILY
DISCHARGE
Start: 2021-07-22 | End: 2023-07-17

## 2021-07-22 RX ORDER — LIDOCAINE 4 G/G
2 PATCH TOPICAL EVERY 24 HOURS
Qty: 14 PATCH | Refills: 0 | DISCHARGE
Start: 2021-07-22 | End: 2021-08-25

## 2021-07-22 RX ORDER — OXYCODONE HYDROCHLORIDE 5 MG/1
2.5 TABLET ORAL EVERY 6 HOURS PRN
Qty: 12 TABLET | Refills: 0 | Status: SHIPPED | DISCHARGE
Start: 2021-07-22 | End: 2021-07-25

## 2021-07-22 RX ORDER — POLYETHYLENE GLYCOL 3350 17 G/17G
17 POWDER, FOR SOLUTION ORAL DAILY
Qty: 510 G | DISCHARGE
Start: 2021-07-22 | End: 2024-01-10

## 2021-07-22 RX ADMIN — CYCLOBENZAPRINE 5 MG: 5 TABLET, FILM COATED ORAL at 08:12

## 2021-07-22 RX ADMIN — ACETAMINOPHEN 650 MG: 325 TABLET, FILM COATED ORAL at 11:02

## 2021-07-22 RX ADMIN — ACETAMINOPHEN 650 MG: 325 TABLET, FILM COATED ORAL at 04:21

## 2021-07-22 RX ADMIN — OXYCODONE HYDROCHLORIDE 5 MG: 5 TABLET ORAL at 00:18

## 2021-07-22 RX ADMIN — CYCLOBENZAPRINE 5 MG: 5 TABLET, FILM COATED ORAL at 13:51

## 2021-07-22 RX ADMIN — Medication 125 MCG: at 08:12

## 2021-07-22 RX ADMIN — OXYCODONE HYDROCHLORIDE 5 MG: 5 TABLET ORAL at 04:21

## 2021-07-22 RX ADMIN — GABAPENTIN 300 MG: 300 CAPSULE ORAL at 13:51

## 2021-07-22 RX ADMIN — GABAPENTIN 300 MG: 300 CAPSULE ORAL at 08:12

## 2021-07-22 NOTE — PLAN OF CARE
"/80 (BP Location: Right arm)   Pulse 68   Temp 98.8  F (37.1  C) (Oral)   Resp 18   Ht 1.626 m (5' 4\")   Wt 48.5 kg (106 lb 14.8 oz)   LMP  (LMP Unknown)   SpO2 95%   BMI 18.35 kg/m      -diagnostic tests and consults completed and resulted - in progress  -vital signs normal or at patient baseline - met, VSS   -adequate pain control on oral analgesics - not met, pt received oxycodone around 0020. Pt would like to stay on top of pain management by receiving it every 4 hours as ordered.   -safe disposition plan has been identified - in progress    Nurse to notify provider when observation goals have been met and patient is ready for discharge.  "

## 2021-07-22 NOTE — PLAN OF CARE
"Observation Goals    -diagnostic tests and consults completed and resulted PENDING  -vital signs normal or at patient baseline YES /80 (BP Location: Right arm)   Pulse 68   Temp 98.8  F (37.1  C) (Oral)   Resp 18   Ht 1.626 m (5' 4\")   Wt 48.5 kg (106 lb 14.8 oz)   LMP  (LMP Unknown)   SpO2 95%   BMI 18.35 kg/m      -adequate pain control on oral analgesics YES: given oxy twice overnight and developed some confusion so will try to avoid using oxy  -safe disposition plan has been identified NO: ride needs to be set up  Nurse to notify provider when observation goals have been met and patient is ready for discharge.  "

## 2021-07-22 NOTE — PLAN OF CARE
"/80 (BP Location: Right arm)   Pulse 68   Temp 98.8  F (37.1  C) (Oral)   Resp 18   Ht 1.626 m (5' 4\")   Wt 48.5 kg (106 lb 14.8 oz)   LMP  (LMP Unknown)   SpO2 95%   BMI 18.35 kg/m      -diagnostic tests and consults completed and resulted - in progress   -vital signs normal or at patient baseline - met; VSS  -adequate pain control on oral analgesics - not met; pt got some relief w/ oxycodone at 0020  -safe disposition plan has been identified - in progress  "

## 2021-07-22 NOTE — PROGRESS NOTES
Care Management Discharge Note    Discharge Date:    Expected Time of Departure: 1600 stretcher p/u via [employer] (P: [phone])    Discharge Disposition: Transitional Care    [facility]  [address] [place]  [address]  P: [phone]  P: [phone] - Admissions  P: [phone] - Admissions - June  P: [phone] - RN Report  F: [phone]    Discharge Transportation: other (see comments) (TBD - Medical transportation likely)    Private pay costs discussed: private room/amenity fees    PAS Confirmation Code: [id number]  Patient/family educated on Medicare website which has current facility and service quality ratings: yes    Education Provided on the Discharge Plan:  Yes  Persons Notified of Discharge Plans: Pt, SNF, Bedside RN, Charge RN, NP  Patient/Family in Agreement with the Plan: yes    Handoff Referral Completed: Yes    Additional Information:  Chart reviewed. Case discussed with bedside RN and medical provider. Pt medically appropriate for discharge today. Per bedside RN, pt would not be able to tolerate the duration of ride in a w/c d/t hip Fx. Writer scheduled a 1600 stretcher p/u via [employer] (P: [phone]). Writer confirmed discharge plan with medical team.    Writer met with pt. Pt expressed frustration with needing to wait for staff to respond to her call light and how she needs assistance with ordering lunch. Writer provided emotional support - supportive listening, validation, and encouragement. Writer helped pt dial  and left pt to order her lunch. Pt agreeable to discharge plan.    Referrals have been made to the following facilities and their status is as follows:    [facility]  [address] [place]  [address]  P: [phone]  P: [phone] - Admissions  P: [phone] - Admissions - June  P: [phone] - RN Report  F: [phone]  - Writer received VM from June requesting  confirmation of pt's discharge today. June requested if pt could arrive around 1300 if possible.   - Writer spoke with Wilma in admissions. SNF can admit pt at 1600 discharge time, but would prefer pt to arrive earlier if possible.  - Writer e-faxed orders and manually faxed script @ 1138.  - Writer spoke with Wilma in admissions. Confirmed discharge time with SNF. SNF confirmed they received the script but not orders. Writer e-faxed orders again at 1237.    ADDENDUM 1600:  Writer received call from Katherine. Katherine had questions regarding pt's post TCU discharge plans. Writer explained the role of the SW at the TCU and encouraged her to speak with the TCU SW. Writer provided Katherine with the number and address of the TCU.  ________________    JEFF Mckeon, [facility]  ED/Observation   [facility]  Phone: [phone]  Pager: [phone]  Fax: [phone]    On-call pager, [phone], 4:00pm to midnight

## 2021-07-22 NOTE — PLAN OF CARE
"/80 (BP Location: Right arm)   Pulse 68   Temp 98.8  F (37.1  C) (Oral)   Resp 18   Ht 1.626 m (5' 4\")   Wt 48.5 kg (106 lb 14.8 oz)   LMP  (LMP Unknown)   SpO2 95%   BMI 18.35 kg/m      -diagnostic tests and consults completed and resulted - in progress  -vital signs normal or at patient baseline - met; VSS  -adequate pain control on oral analgesics - not met; pt wants to receive pain medication every 4 hours as ordered  -safe disposition plan has been identified - in progress  "

## 2021-07-22 NOTE — PLAN OF CARE
"Observation Goals     -diagnostic tests and consults completed and resulted PENDING  -vital signs normal or at patient baseline YES /77 (BP Location: Right arm)   Pulse 77   Temp 98  F (36.7  C) (Oral)   Resp 18   Ht 1.626 m (5' 4\")   Wt 48.5 kg (106 lb 14.8 oz)   LMP  (LMP Unknown)   SpO2 98%   BMI 18.35 kg/m        -adequate pain control on oral analgesics YES: given oxy twice overnight and developed some confusion so will try to avoid using oxy  -safe disposition plan has been identified YES: ride at 1600  Nurse to notify provider when observation goals have been met and patient is ready for discharge.  "

## 2021-07-22 NOTE — PROGRESS NOTES
"/77 (BP Location: Right arm)   Pulse 77   Temp 98  F (36.7  C) (Oral)   Resp 18   Ht 1.626 m (5' 4\")   Wt 48.5 kg (106 lb 14.8 oz)   LMP  (LMP Unknown)   SpO2 98%   BMI 18.35 kg/m    Patient's condition and vital Signs are stable/WNL.  Discharge instructions reviewed with patient and questions answered. Patient verbalizes understanding. IV removed. Pain under control.  Patient is tolerating regular diet and denies any N/V. Patient to be discharged to [facility]   via [facility]. Patient has all belongings.    "

## 2021-07-22 NOTE — PROGRESS NOTES
"/80 (BP Location: Right arm)   Pulse 68   Temp 98.8  F (37.1  C) (Oral)   Resp 18   Ht 1.626 m (5' 4\")   Wt 48.5 kg (106 lb 14.8 oz)   LMP  (LMP Unknown)   SpO2 95%   BMI 18.35 kg/m         -diagnostic tests and consults completed and resulted - in progress  -vital signs normal or at patient baseline - met, VSS   -adequate pain control on oral analgesics - not met, pt received oxycodone around 0418. Pt would like to stay on top of pain management by receiving it every 4 hours as ordered. Pt reported getting relief with oxycodone and then fell asleep.   -safe disposition plan has been identified - in progress    Nurse to notify provider when observation goals have been met and patient is ready for discharge.  "

## 2021-07-22 NOTE — DISCHARGE SUMMARY
Discharge Summary    Elizabeth Taylor MRN# [id number]   YOB: 1940 Age: 81 year old     Date of Admission:  7/20/2021  Date of Discharge:  7/22/2021  Admitting Physician:  Heather Liz CNP  Discharge Physician:  Dr. Mckeon  Discharging Service:  Emergency Medicine     Primary Provider: Jann Henderson          Discharge Diagnosis:     Fall, initial encounter    Hip fracture, right, closed, initial encounter (H)    * No resolved hospital problems. *               Discharge Disposition:   Discharged to rehabilitation facility           Condition on Discharge:   Discharge condition: Stable   Code status on discharge: Full Code           Procedures:   No procedures performed during this admission          Discharge Medications:     Current Discharge Medication List      START taking these medications    Details   Lidocaine (LIDOCARE) 4 % Patch Place 2 patches onto the skin every 24 hours To prevent lidocaine toxicity, patient should be patch free for 12 hrs daily.  Qty: 14 patch, Refills: 0    Associated Diagnoses: Hip fracture, right, closed, initial encounter (H)      oxyCODONE (ROXICODONE) 5 MG tablet Take 0.5 tablets (2.5 mg) by mouth every 6 hours as needed for pain  Qty: 12 tablet, Refills: 0    Associated Diagnoses: Hip fracture, right, closed, initial encounter (H)      polyethylene glycol (MIRALAX) 17 GM/Dose powder Take 17 g by mouth daily  Qty: 510 g    Associated Diagnoses: Hip fracture, right, closed, initial encounter (H)         CONTINUE these medications which have CHANGED    Details   gabapentin (NEURONTIN) 300 MG capsule Take 1 capsule (300 mg) by mouth 3 times daily    Associated Diagnoses: Hip fracture, right, closed, initial encounter (H)         CONTINUE these medications which have NOT CHANGED    Details   acetaminophen (TYLENOL) 500 MG tablet Take 1,000 mg by mouth 3 times daily      Calcium-Magnesium-Zinc 333-133-5 MG TABS per tablet Take 1 tablet by mouth daily       cholecalciferol (VITAMIN D3) 5000 units (125 mcg) capsule 1 CAP BY MOUTH DAILY (DX: OSTEOPOROSIS)  Qty: 90 capsule, Refills: 3    Associated Diagnoses: Age-related osteoporosis with current pathological fracture, initial encounter; Pathological fracture of pelvis, unspecified pathological cause, initial encounter      conjugated estrogens (PREMARIN) 0.625 MG/GM vaginal cream Apply locally to clitoral randall pea sized amount daily for 2 weeks then prn.  Qty: 4 g, Refills: 0    Associated Diagnoses: Malignant neoplasm of anal canal (H); Atrophy of vulva      docusate sodium (COLACE) 100 MG capsule Take 1 capsule (100 mg) by mouth 2 times daily  Qty: 60 capsule    Associated Diagnoses: Drug-induced constipation      estradiol (ESTRACE) 0.1 MG/GM vaginal cream Apply locally to clitoral randall pea sized amount daily for 2 weeks then prn.  Qty: 42.5 g, Refills: 0    Associated Diagnoses: Malignant neoplasm of anal canal (H); Atrophic vaginitis; Vaginal stenosis      metroNIDAZOLE (METROGEL) 0.75 % external gel Apply topically 2 times daily  Qty: 45 g, Refills: 3    Associated Diagnoses: Rosacea      vitamin B complex with vitamin C (VITAMIN  B COMPLEX) TABS tablet Take 1 tablet by mouth daily      VITAMIN E PO Take 1 capsule by mouth daily                   Consultations:   Consultation during this admission received from orthopedics             Brief History of Illness:   Please see detailed H&P from 7/20/21, in brief: Elizabeth Taylor is a 81 year old female admitted on 7/20/2021. She a past medical history significant for Anal ca s/p RT who presents to the Emergency Department for evaluation of right hip pain 2/2 mechanical fall on Saturday (7/17).            Hospital Course:      # Fall  # Right periprosthetic hip fracture  Pt reports falling on Sunday around 3am. Patient reports that she was standing up in her kitchen on Saturday to grab a snack when she had a mechanical fall backwards at approximately 3 AM on  "Sunday. She has tried to manage the pain as an outpatient but it has become unbearable. XRays were done at the direction of her PCP and they showed \"suspicion of fracture\" of right hip. In the ED, VSS. CT Hip shows periprosthetic fracture of right hip.  Othopedics evaluated patient and advise that she can be weight bearing as tolerated with assist, and have a regular diet as the plan is not for any surgical intervention.  Patient admitted to the ED observation unit.  She was started on Tylenol scheduled, gabapentin, Flexeril scheduled, Lidoderm patch, oxycodone as needed.  Her pain was well controlled on these measures.  However, she did have some confusion.  I discussed the patient's case with her Sister Sahil.  Sahil confirmed that the patient has baseline memory problems and forgets names.  I advised the patient and TCU to only use oxycodone for severe, breakthrough pain.  I am concerned that this could worsen her confusion.  Her Flexeril was discontinued.  She was discharged on scheduled Tylenol, gabapentin, Lidoderm patch, oxycodone as needed.  She was discharged to TCU in stable condition, given all instructions for follow-up and when to return to the ED.    -WBAT RLE with walker  -Follow-up: Dr. Phan 6 weeks for repeat XR R hip     # Disposition planning  Patient has been working with case management and home health nursing to try to stay in her home. Per chart review, \"there is a lot of clutter\" in the home. Patient reports she feels like she cannot remember things lately and feels very scattered.  PT recommended TCU.  OT deferred their evaluation, see their note for details.  -Discharge to TCU     Chronic Medical Conditions  #Hx of mT2N1 anal canal squamous cell carcinoma (s/p CXRT [5,400 cGy], completed on 3/23/18)  Last flex sigmoidoscopy was 6/2021. Mild radiation proctitis. No other abnormality. PET/CT in 8/2021 and next colonoscopy in 2022.             Final Day of Progress before Discharge:       " "Physical Exam:  Blood pressure 112/77, pulse 77, temperature 98  F (36.7  C), temperature source Oral, resp. rate 18, height 1.626 m (5' 4\"), weight 48.5 kg (106 lb 14.8 oz), SpO2 98 %, not currently breastfeeding.    EXAM:  Exam:  Constitutional: healthy, alert and no distress  Cardiovascular: No lifts, heaves, or thrills. RRR. No murmurs, clicks gallops or rub  Respiratory: Good diaphragmatic excursion. Lungs clear  Gastrointestinal: Abdomen soft, non-tender. BS normal. No masses, organomegaly  Musculoskeletal: extremities normal- no gross deformities noted, gait normal and normal muscle tone  Skin: no suspicious lesions or rashes  Neurologic: Alert and oriented to self and situation, forgetful of names  Psychiatric: forgetful.    /77 (BP Location: Right arm)   Pulse 77   Temp 98  F (36.7  C) (Oral)   Resp 18   Ht 1.626 m (5' 4\")   Wt 48.5 kg (106 lb 14.8 oz)   LMP  (LMP Unknown)   SpO2 98%   BMI 18.35 kg/m               Data:  All laboratory data reviewed             Significant Results:     Results for orders placed or performed during the hospital encounter of 07/20/21   CT Hip Right w/o Contrast     Status: None    Narrative    EXAM: CT HIP RIGHT W/O CONTRAST  7/20/2021 4:33 PM      HISTORY: Fracture, hip    COMPARISON: Radiographs 7/18/2021    TECHNIQUE: CT imaging of the right hip without IV contrast.  Multiplanar reconstructions.    FINDINGS:      images demonstrates right hip hemiarthroplasty. Degenerative  changes of the left hip. Fracture through the right greater trochanter  better demonstrated on CT images.    Similar to recent radiographs, there is a comminuted, nondisplaced  fracture of the greater tuberosity in this patient status post right  hip hemiarthroplasty. No substantial change compared to recent  radiographs. Bony detail is compromised by metallic beam hardening  artifact. No substantial distal fracture extension.    Right sacroiliac joint degenerative change. Partially " visualized  lumbar spondylosis.    Fatty replacement of the gluteus minimus. Cholelithiasis.       Impression    IMPRESSION:     1. Similar to recent radiographs, comminuted nondisplaced fracture of  the greater trochanter in the setting of right hip hemiarthroplasty.    2. Cholelithiasis.    ANGELA HORNE MD (Joe)         SYSTEM ID:  [id number]   INR     Status: Normal   Result Value Ref Range    INR 1.02 0.85 - 1.15   Comprehensive metabolic panel     Status: Abnormal   Result Value Ref Range    Sodium 139 133 - 144 mmol/L    Potassium 3.8 3.4 - 5.3 mmol/L    Chloride 108 94 - 109 mmol/L    Carbon Dioxide (CO2) 30 20 - 32 mmol/L    Anion Gap 1 (L) 3 - 14 mmol/L    Urea Nitrogen 22 7 - 30 mg/dL    Creatinine 0.64 0.52 - 1.04 mg/dL    Calcium 9.1 8.5 - 10.1 mg/dL    Glucose 71 70 - 99 mg/dL    Alkaline Phosphatase 83 40 - 150 U/L    AST 31 0 - 45 U/L    ALT 65 (H) 0 - 50 U/L    Protein Total 6.9 6.8 - 8.8 g/dL    Albumin 3.6 3.4 - 5.0 g/dL    Bilirubin Total 1.2 0.2 - 1.3 mg/dL    GFR Estimate 83 >60 mL/min/1.73m2   UA with Microscopic reflex to Culture     Status: Abnormal    Specimen: Urine, Midstream   Result Value Ref Range    Color Urine Light Yellow Colorless, Straw, Light Yellow, Yellow    Appearance Urine Clear Clear    Glucose Urine Negative Negative mg/dL    Bilirubin Urine Negative Negative    Ketones Urine Negative Negative mg/dL    Specific Gravity Urine 1.024 1.003 - 1.035    Blood Urine Negative Negative    pH Urine 6.0 5.0 - 7.0    Protein Albumin Urine Negative Negative mg/dL    Urobilinogen Urine Normal Normal, 2.0 mg/dL    Nitrite Urine Negative Negative    Leukocyte Esterase Urine Negative Negative    Mucus Urine Present (A) None Seen /LPF    RBC Urine 1 <=2 /HPF    WBC Urine 2 <=5 /HPF    Squamous Epithelials Urine <1 <=1 /HPF    Narrative    Urine Culture not indicated   CBC with platelets and differential     Status: Abnormal   Result Value Ref Range    WBC Count 5.1 4.0 - 11.0 10e3/uL     RBC Count 3.33 (L) 3.80 - 5.20 10e6/uL    Hemoglobin 10.7 (L) 11.7 - 15.7 g/dL    Hematocrit 33.2 (L) 35.0 - 47.0 %     78 - 100 fL    MCH 32.1 26.5 - 33.0 pg    MCHC 32.2 31.5 - 36.5 g/dL    RDW 13.7 10.0 - 15.0 %    Platelet Count 150 150 - 450 10e3/uL    % Neutrophils 76 %    % Lymphocytes 13 %    % Monocytes 10 %    % Eosinophils 0 %    % Basophils 1 %    % Immature Granulocytes 0 %    NRBCs per 100 WBC 0 <1 /100    Absolute Neutrophils 3.9 1.6 - 8.3 10e3/uL    Absolute Lymphocytes 0.6 (L) 0.8 - 5.3 10e3/uL    Absolute Monocytes 0.5 0.0 - 1.3 10e3/uL    Absolute Eosinophils 0.0 0.0 - 0.7 10e3/uL    Absolute Basophils 0.0 0.0 - 0.2 10e3/uL    Absolute Immature Granulocytes 0.0 <=0.0 10e3/uL    Absolute NRBCs 0.0 10e3/uL   SARS-COV2 (COVID-19) Virus RT-PCR     Status: Normal    Specimen: Nasopharyngeal; Swab   Result Value Ref Range    SARS CoV2 PCR Negative Negative    Narrative    Testing was performed using the Xpert Xpress SARS-CoV-2 Assay on the  Cepheid Gene-Xpert Instrument Systems. Additional information about  this Emergency Use Authorization (EUA) assay can be found via the Lab  Guide. This test should be ordered for the detection of SARS-CoV-2 in  individuals who meet SARS-CoV-2 clinical and/or epidemiological  criteria. Test performance is unknown in asymptomatic patients. This  test is for in vitro diagnostic use under the FDA EUA for  laboratories certified under CLIA to perform high complexity testing.  This test has not been FDA cleared or approved. A negative result  does not rule out the presence of PCR inhibitors in the specimen or  target RNA in concentration below the limit of detection for the  assay. The possibility of a false negative should be considered if  the patient's recent exposure or clinical presentation suggests  COVID-19. This test was validated by the [facility]. This laboratory is certified under  the Clinical Laboratory  Improvement Amendments of 1988 (CLIA-88) as  qualified to perform high complexity laboratory testing.     CBC with platelets     Status: Abnormal   Result Value Ref Range    WBC Count 4.9 4.0 - 11.0 10e3/uL    RBC Count 3.53 (L) 3.80 - 5.20 10e6/uL    Hemoglobin 11.5 (L) 11.7 - 15.7 g/dL    Hematocrit 36.5 35.0 - 47.0 %     (H) 78 - 100 fL    MCH 32.6 26.5 - 33.0 pg    MCHC 31.5 31.5 - 36.5 g/dL    RDW 13.7 10.0 - 15.0 %    Platelet Count 178 150 - 450 10e3/uL   Basic metabolic panel     Status: Abnormal   Result Value Ref Range    Sodium 142 133 - 144 mmol/L    Potassium 3.6 3.4 - 5.3 mmol/L    Chloride 111 (H) 94 - 109 mmol/L    Carbon Dioxide (CO2) 27 20 - 32 mmol/L    Anion Gap 4 3 - 14 mmol/L    Urea Nitrogen 20 7 - 30 mg/dL    Creatinine 0.58 0.52 - 1.04 mg/dL    Calcium 8.4 (L) 8.5 - 10.1 mg/dL    Glucose 87 70 - 99 mg/dL    GFR Estimate 86 >60 mL/min/1.73m2   EKG 12-lead, tracing only     Status: None   Result Value Ref Range    Systolic Blood Pressure  mmHg    Diastolic Blood Pressure  mmHg    Ventricular Rate 59 BPM    Atrial Rate 59 BPM    TN Interval 148 ms    QRS Duration 88 ms     ms    QTc 413 ms    P Axis 83 degrees    R AXIS 43 degrees    T Axis 70 degrees    Interpretation ECG       Sinus bradycardia  Minimal voltage criteria for LVH, may be normal variant  Borderline ECG    Unconfirmed report - interpretation of this ECG is computer generated - see medical record for final interpretation  Confirmed by - EMERGENCY ROOM, PHYSICIAN ([id number]),  STU BARRIOS ([id number]) on 7/21/2021 11:36:59 AM     Care Management / Social Work IP Consult     Status: None ()    Steve Parr LICSW     7/21/2021  7:00 PM  Care Management Follow Up    Length of Stay (days): 0    Expected Discharge Date:    Expected Time of Departure: TBD  Concerns to be Addressed: discharge planning     Patient plan of care discussed at interdisciplinary rounds: Yes    Anticipated Discharge Disposition:  Transitional Care     Patient/family educated on Medicare website which has current   facility and service quality ratings: yes  Education Provided on the Discharge Plan:    Patient/Family in Agreement with the Plan: yes    Referrals Placed by CM/SW:  Referrals have been made to the   following facilities and their status is as follows:    [facility]  P: [phone]  P: [phone] - Admissions  F: [phone]  - Writer spoke with Jada in admissions, who reports they can   review and assess pt. Writer e-faxed referral for admissions to   review.  - Pt selected another facility. See addendum. Writer has ceased   following this referral.    [facility] - Accepted  [address] [place]  [address]  P: [phone]  P: [phone] - Admissions  F: [phone]  - Writer spoke with Wilma in admissions, who reports they can   review and assess pt. Writer e-faxed referral for admissions to   review.  - Writer spoke with June in admissions. SNF has clinically   accepted pt to a private room/private bath (no fee) for tomorrow.    [facility]  [address] [place]  [address]  P: [phone]  P: [phone] - Admissions  F: [phone]  - Writer left VM with SNF admissions. Writer preemptively faxed   referral packet for SNF to review.    [facility]  [address] [place]  [address]  P: [phone]  F: [phone]  - Writer spoke with Natividad in admissions, who reports they can   review and assess pt. Writer e-faxed referral for admissions to   review.    [facility]  [address], [place] [address]  P: [phone]  F: [phone]  - Writer left VM with SNF admissions.  Writer preemptively faxed   referral packet for SNF to review.    Private pay costs discussed: private room/amenity fees and   transportation costs    Additional Information:  Elizabeth Taylor is a 81 year old female admitted on   7/20/2021. She a past medical history significant  for Anal ca s/p   RT who presents to the Emergency Department for evaluation of   right hip pain 2/2 mechanical fall on Saturday (7/17).       Chart reviewed. Case discussed with bedside RN and medical   provider.    Writer met with pt. Writer introduced self, discussed role and   went over writer's availability. Writer met with pt to discuss   and complete LEO paperwork. Writer answered any of pt's   questions. Pt signed LEO form and a copy was placed on pt's   chart.    Writer discussed discharge recommendations from PT. Writer   provided pt a list of SNFs from Medicare.gov. Discussed SNF   options and made referrals (see above).    Writer updated pt on her acceptance to [facility] home. Pt   is agreeable to going to [facility] so long as [facility]   cannot accept her.     Writer received call from pt's sister Sahil, who had pt's friend   Katherine on the phone with her. Discussed anticipated discharge plan   for pt. Sahil and Katherine had several questions regarding coverage and   cost. Writer explained medicare coverage for TCU and how pt could   apply for MA if pt requires LTC. Sahil and Katherine report pt has very   limited finances and may need MA in the future. Sahil and Katherine are   agreeable to pt going to [facility] if pt is agreeable.    ADDENDUM 1710:  Writer spoke with Jada in admissions at [facility]. [facility] could   offer pt a shared room. Pt reports she would not be interested in   a shared room and would now [facility] offering a [facility]. SW to f/u with pt tomorrow to   confirm transportation to SNF.    ADDENDUM 7075:   Writer met with pt and pt's friend Katherine and discussed the   discharge plan for tomorrow. Discussed medical transportation. Pt   reports she would like her friend, Katherine, to transport her there,   but Katherine states she is unavailable until late in the evening.   Writer brought up how pt is requiring significant assistance to   move and is in pain when being  moved. Writer recommended medical   transportation for pt. Pt is agreeable to medical transportation   being arranged for her at discharge and is aware of the costs. SW   to arrange transportation tomorrow morning.  ________________    JEFF Mckeon, [facility]  ED/Observation   [facility]  Phone: [phone]  Pager: [phone]  Fax: [phone]    On-call pager, [phone], 4:00pm to midnight     Adult Type and Screen     Status: None   Result Value Ref Range    ABO/RH(D) AB POS     Antibody Screen Negative Negative    SPECIMEN EXPIRATION DATE [id number]    Asymptomatic COVID-19 Virus (Coronavirus) by PCR Nasopharyngeal     Status: Normal    Specimen: Nasopharyngeal; Swab    Narrative    The following orders were created for panel order Asymptomatic COVID-19 Virus (Coronavirus) by PCR Nasopharyngeal.  Procedure                               Abnormality         Status                     ---------                               -----------         ------                     SARS-COV2 (COVID-19) Vir...[[id number]]  Normal              Final result                 Please view results for these tests on the individual orders.   CBC with platelets differential     Status: Abnormal    Narrative    The following orders were created for panel order CBC with platelets differential.  Procedure                               Abnormality         Status                     ---------                               -----------         ------                     CBC with platelets and d...[[id number]]  Abnormal            Final result                 Please view results for these tests on the individual orders.   ABO/Rh type and screen     Status: None    Narrative    The following orders were created for panel order ABO/Rh type and screen.  Procedure                               Abnormality         Status                     ---------                               -----------         ------                      Adult Type and Screen[[id number]]                            Final result                 Please view results for these tests on the individual orders.      Recent Results (from the past 48 hour(s))   CT Hip Right w/o Contrast    Narrative    EXAM: CT HIP RIGHT W/O CONTRAST  7/20/2021 4:33 PM      HISTORY: Fracture, hip    COMPARISON: Radiographs 7/18/2021    TECHNIQUE: CT imaging of the right hip without IV contrast.  Multiplanar reconstructions.    FINDINGS:      images demonstrates right hip hemiarthroplasty. Degenerative  changes of the left hip. Fracture through the right greater trochanter  better demonstrated on CT images.    Similar to recent radiographs, there is a comminuted, nondisplaced  fracture of the greater tuberosity in this patient status post right  hip hemiarthroplasty. No substantial change compared to recent  radiographs. Bony detail is compromised by metallic beam hardening  artifact. No substantial distal fracture extension.    Right sacroiliac joint degenerative change. Partially visualized  lumbar spondylosis.    Fatty replacement of the gluteus minimus. Cholelithiasis.       Impression    IMPRESSION:     1. Similar to recent radiographs, comminuted nondisplaced fracture of  the greater trochanter in the setting of right hip hemiarthroplasty.    2. Cholelithiasis.    ANGELA HORNE MD (Joe)         SYSTEM ID:  [id number]                Pending Results:   Unresulted Labs Ordered in the Past 30 Days of this Admission     No orders found from 6/20/2021 to 7/21/2021.                  Discharge Instructions and Follow-Up:     Discharge Procedure Orders   General info for SNF   Order Comments: Length of Stay Estimate: Short Term Care: Estimated # of Days <30  Condition at Discharge: Stable  Level of care:skilled   Rehabilitation Potential: Good  Admission H&P remains valid and up-to-date: Yes  Recent Chemotherapy: N/A  Use Nursing Home Standing Orders: Yes     Mantoux instructions    Order Comments: Give two-step Mantoux (PPD) Per Facility Policy Yes     Follow Up and recommended labs and tests   Order Comments: Follow up with Nursing home physician.  No follow up labs or test are needed.  Follow-up with orthopedics, Dr. Phan, in 6 weeks with repeat x-rays of the right hip.     Reason for your hospital stay   Order Comments: You were admitted to the observation unit after a fall.  X-rays showed a suspicion of fracture of your right hip.  CT of your hip showed a periprosthetic fracture of your right hip.  Orthopedics was consulted and recommended weightbearing as tolerated with assist.  No surgical intervention at this time.  Follow-up with orthopedics, Dr. Phan, in 6 weeks for repeat x-ray of your right hip.  Physical therapy was consulted and recommended TCU placement.  Social work arranged this.  You were started on oral medications for pain management.  1 of these medications is oxycodone.  This is a narcotic pain medication, this can cause confusion in elderly patients.  Please only take oxycodone for severe pain refractory to prior pain interventions including Tylenol, ice, heat, gabapentin, lidocaine patch.     Activity - Up with nursing assistance     Order Specific Question Answer Comments   Is discharge order? Yes      Full Code     Order Specific Question Answer Comments   Code status determined by: Discussion with patient/ legal decision maker      Physical Therapy Adult Consult   Order Comments: Evaluate and treat as clinically indicated.    Reason:  falls, right periprosthetic hip fracture     Occupational Therapy Adult Consult   Order Comments: Evaluate and treat as clinically indicated.    Reason: Falls, right periprosthetic hip fracture     Fall precautions     Advance Diet as Tolerated   Order Comments: Follow this diet upon discharge: Orders Placed This Encounter      Regular Diet Adult     Order Specific Question Answer Comments   Is discharge order? Yes            Attestation:  June Guajardo, APRN CNP.

## 2021-07-22 NOTE — PLAN OF CARE
OT: after conversation with pt and interdisciplinary team it is concluded that this pt does indeed need an OT evaluation however is accepted to TCU and the evaluation would be best completed there. Pt with intermittent confusion and mobility deficits limiting ADL I/safety. Defer acute OT today.

## 2021-07-22 NOTE — PLAN OF CARE
"Outpatient/Observation goals to be met before discharge home:   /80 (BP Location: Right arm)   Pulse 68   Temp 98.8  F (37.1  C) (Oral)   Resp 18   Ht 1.626 m (5' 4\")   Wt 48.5 kg (106 lb 14.8 oz)   LMP  (LMP Unknown)   SpO2 95%   BMI 18.35 kg/m      -diagnostic tests and consults completed and resulted: In-process  -vital signs normal or at patient baseline: Met  -adequate pain control on oral analgesics: On-going  -safe disposition plan has been identified: In-process  "

## 2021-07-26 ENCOUNTER — PATIENT OUTREACH (OUTPATIENT)
Dept: OTHER | Facility: CLINIC | Age: 81
End: 2021-07-26

## 2021-07-26 ENCOUNTER — TRANSITIONAL CARE UNIT VISIT (OUTPATIENT)
Dept: GERIATRICS | Facility: CLINIC | Age: 81
End: 2021-07-26
Payer: MEDICARE

## 2021-07-26 VITALS
DIASTOLIC BLOOD PRESSURE: 76 MMHG | RESPIRATION RATE: 18 BRPM | OXYGEN SATURATION: 96 % | TEMPERATURE: 97.8 F | WEIGHT: 112.8 LBS | SYSTOLIC BLOOD PRESSURE: 123 MMHG | BODY MASS INDEX: 19.26 KG/M2 | HEIGHT: 64 IN | HEART RATE: 88 BPM
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
## 2021-07-26 DIAGNOSIS — Z96.649 PERIPROSTHETIC FRACTURE OF HIP, SUBSEQUENT ENCOUNTER: Primary | ICD-10-CM

## 2021-07-26 DIAGNOSIS — R41.3 MEMORY PROBLEM: ICD-10-CM

## 2021-07-26 DIAGNOSIS — N95.2 ATROPHIC VAGINITIS: ICD-10-CM

## 2021-07-26 DIAGNOSIS — M97.8XXD PERIPROSTHETIC FRACTURE OF HIP, SUBSEQUENT ENCOUNTER: Primary | ICD-10-CM

## 2021-07-26 DIAGNOSIS — Z85.048 HISTORY OF MALIGNANT NEOPLASM OF ANUS: ICD-10-CM

## 2021-07-26 PROCEDURE — 99305 1ST NF CARE MODERATE MDM 35: CPT | Performed by: NURSE PRACTITIONER

## 2021-07-26 ASSESSMENT — MIFFLIN-ST. JEOR: SCORE: 956.66

## 2021-07-26 NOTE — LETTER
"    2021        RE: Elizabeth Taylor  [address]  [address]  [place] [address]        [facility]    Name:   Elizabeth Taylor  :   1940  MRN:    [id number]     Facility:   [facility] () [[id number]]   Room:   Code Status: FULL CODE and POLST AVAILABLE -     DOS: 2021  Previous visit: N/A    PCP:  Jann Henderson    CHIEF COMPLAINT / REASON FOR VISIT:  Chief Complaint   Patient presents with     Hospital F/U     Periprosthetic fracture of the right hip      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  She had tried managing right hip pain as an outpatient, but it soon became unbearable.  X-rays were performed at the direction of her PCP, and they showed \"suspicion of fracture\" of the right hip.  In the ED, a CT showed a periprosthetic fracture.  Orthopedics evaluated patient and advised that she could be weightbearing as tolerated with assistant without the need for surgical intervention.  She was admitted to the ED observation unit and was not treated with scheduled acetaminophen, gabapentin, cyclobenzaprine, Lidoderm patch, and as needed oxycodone.  Her pain was well controlled with these measures.  However, she did have some confusion, and the patient was discussed with her sister, Sahil.  Sahil confirmed that the patient has baseline memory problems.  Patient and ultimately the TCU were advised to only use oxycodone for severe breakthrough pain, as there is concern of worsening her confusion.  Prior to discharge, the " "cyclobenzaprine was discontinued, and he was discharged to the TCU on the remaining medications.  She can be weightbearing as tolerated with a walker and should follow-up with Dr. Phan in 6 weeks for repeat x-rays.      CURRENT/RECENT TCU ISSUES    Disposition: The patient has admitted to some confusion, stating that she did not know where she was.  During my visit with her, speech tended to be rambling.  When asked the year, she answered correctly with a slight delay; however, when asked the month, she SAID, \", no, it's not August yet.  It's June.  My birthday is [birth year].  She apparently forgot that she had a birthday just 12 days ago.    She states that her pain when walking to the bathroom is bearable.  She has not asked for any pain medications and is simply given the scheduled ones.  She may have received a single dose of oxycodone, because she clearly looks in pain.  He continues to have regular bowel movements multiple times per day.    Discharge planning: She lives alone in an apartment.    ROS: No headaches or chest pains, coughing or congestion, nausea or vomiting, dizziness or dyspnea, dysuria, difficulty chewing or swallowing, integumentary issues, or problems with appetite.  Nursing staff tell me that she is not sleeping, but I would prefer to give her a bit of time to adjust to her new surroundings before having a sleep aid.    Past Medical History:   Diagnosis Date     Anal cancer (H)      Endometriosis, site unspecified      History of malignant neoplasm of anus 7/26/2021     Periprosthetic fracture of right hip, subsequent encounter 7/26/2021     Thyroiditis, unspecified     Thryoiditis-resolved              Family History   Problem Relation Age of Onset     Cancer Sister      Cancer Maternal Grandmother         lymphoma     Heart Disease Father         MI     Uterine Cancer Niece      Social History     Socioeconomic History     Marital status: Single     Spouse name: None     Number of " children: None     Years of education: None     Highest education level: None   Occupational History     None   Tobacco Use     Smoking status: Never Smoker     Smokeless tobacco: Never Used   Substance and Sexual Activity     Alcohol use: No     Drug use: No     Sexual activity: Yes     Partners: Male   Other Topics Concern     Parent/sibling w/ CABG, MI or angioplasty before 65F 55M? Not Asked   Social History Narrative     None     Social Determinants of Health     Financial Resource Strain: Medium Risk     Difficulty of Paying Living Expenses: Somewhat hard   Food Insecurity: No Food Insecurity     Worried About Running Out of Food in the Last Year: Never true     Ran Out of Food in the Last Year: Never true   Transportation Needs: No Transportation Needs     Lack of Transportation (Medical): No     Lack of Transportation (Non-Medical): No   Physical Activity: Insufficiently Active     Days of Exercise per Week: 5 days     Minutes of Exercise per Session: 10 min   Stress: Stress Concern Present     Feeling of Stress : To some extent   Social Connections:      Frequency of Communication with Friends and Family:      Frequency of Social Gatherings with Friends and Family:      Attends [employer] Services:      Active Member of Clubs or Organizations:      Attends Club or Organization Meetings:      Marital Status:    Intimate Partner Violence:      Fear of Current or Ex-Partner:      Emotionally Abused:      Physically Abused:      Sexually Abused:        MEDICATIONS: Reviewed from the MAR, physician orders, and/or earlier progress notes.  Post Discharge Medication Reconciliation Status: discharge medications reconciled, continue medications without change.    Current Outpatient Medications   Medication Sig     acetaminophen (TYLENOL) 500 MG tablet Take 1,000 mg by mouth 3 times daily     Calcium-Magnesium-Zinc 333-133-5 MG TABS per tablet Take 1 tablet by mouth daily     cholecalciferol (VITAMIN D3) 5000 units  "(125 mcg) capsule 1 CAP BY MOUTH DAILY (DX: OSTEOPOROSIS)     conjugated estrogens (PREMARIN) 0.625 MG/GM vaginal cream Apply locally to clitoral randall pea sized amount daily for 2 weeks then prn. (Patient not taking: Reported on 6/28/2021)     docusate sodium (COLACE) 100 MG capsule Take 1 capsule (100 mg) by mouth 2 times daily (Patient not taking: Reported on 6/28/2021)     estradiol (ESTRACE) 0.1 MG/GM vaginal cream Apply locally to clitoral randall pea sized amount daily for 2 weeks then prn. (Patient not taking: Reported on 6/28/2021)     gabapentin (NEURONTIN) 300 MG capsule Take 1 capsule (300 mg) by mouth 3 times daily     Lidocaine (LIDOCARE) 4 % Patch Place 2 patches onto the skin every 24 hours To prevent lidocaine toxicity, patient should be patch free for 12 hrs daily.     metroNIDAZOLE (METROGEL) 0.75 % external gel Apply topically 2 times daily     polyethylene glycol (MIRALAX) 17 GM/Dose powder Take 17 g by mouth daily     vitamin B complex with vitamin C (VITAMIN  B COMPLEX) TABS tablet Take 1 tablet by mouth daily     VITAMIN E PO Take 1 capsule by mouth daily     Current Facility-Administered Medications   Medication     heparin 100 UNIT/ML injection 500 Units     ALLERGIES:   Allergies   Allergen Reactions     Darvon [Propoxyphene]      Had reaction in teen years     Ketoconazole Rash     Penicillins      As a child     DIET: Regular, regular texture, thin liquids.    Vitals:    07/26/21 1254   BP: 123/76   Pulse: 88   Resp: 18   Temp: 97.8  F (36.6  C)   SpO2: 96%   Weight: 51.2 kg (112 lb 12.8 oz)   Height: 1.626 m (5' 4\")     Body mass index is 19.36 kg/m .    EXAMINATION:   General: Pleasant, very loquacious (and rather funny), frail-appearing elderly female, lying in bed and initially found talking on the phone, in no apparent distress.  Her conversation is often rambling.  Head: Normocephalic and atraumatic.   Eyes: PERRLA, sclerae clear.  Eyes are a bit red rimmed.  ENT: Moist oral mucosa.  " She has many of her own teeth.  No rhinorrhea or nasal discharge.  Hearing appears to be quite good.  Cardiovascular: Regular rate and rhythm with intermittent ectopy and no appreciable murmur.   Respiratory: Lungs clear to auscultation bilaterally.   Abdomen: Nondistended.   Musculoskeletal/Extremities: Age-related degenerative joint disease.  Bilateral mild hallux valgus deformities and hammertoes.  Currently, somewhat mild lower extremity edema.  Port-A-Cath in the right upper chest wall.  Integument: Hypertrophic lower extremity skin from venous stasis.  Otherwise, no rashes, clinically significant lesions, or skin breakdown.   Cognitive/Psychiatric: Clearly trying to have some cognitive deficits, though affect is quite upbeat.    DIAGNOSTICS:   Recent Results (from the past 240 hour(s))   EKG 12-lead, tracing only    Collection Time: 07/20/21  3:57 PM   Result Value Ref Range    Systolic Blood Pressure  mmHg    Diastolic Blood Pressure  mmHg    Ventricular Rate 59 BPM    Atrial Rate 59 BPM    AR Interval 148 ms    QRS Duration 88 ms     ms    QTc 413 ms    P Axis 83 degrees    R AXIS 43 degrees    T Axis 70 degrees    Interpretation ECG       Sinus bradycardia  Minimal voltage criteria for LVH, may be normal variant  Borderline ECG    Unconfirmed report - interpretation of this ECG is computer generated - see medical record for final interpretation  Confirmed by - EMERGENCY ROOM, PHYSICIAN ([id number]),  STU BARRIOS ([id number]) on 7/21/2021 11:36:59 AM     INR    Collection Time: 07/20/21  4:08 PM   Result Value Ref Range    INR 1.02 0.85 - 1.15   Comprehensive metabolic panel    Collection Time: 07/20/21  4:08 PM   Result Value Ref Range    Sodium 139 133 - 144 mmol/L    Potassium 3.8 3.4 - 5.3 mmol/L    Chloride 108 94 - 109 mmol/L    Carbon Dioxide (CO2) 30 20 - 32 mmol/L    Anion Gap 1 (L) 3 - 14 mmol/L    Urea Nitrogen 22 7 - 30 mg/dL    Creatinine 0.64 0.52 - 1.04 mg/dL    Calcium 9.1 8.5 - 10.1  mg/dL    Glucose 71 70 - 99 mg/dL    Alkaline Phosphatase 83 40 - 150 U/L    AST 31 0 - 45 U/L    ALT 65 (H) 0 - 50 U/L    Protein Total 6.9 6.8 - 8.8 g/dL    Albumin 3.6 3.4 - 5.0 g/dL    Bilirubin Total 1.2 0.2 - 1.3 mg/dL    GFR Estimate 83 >60 mL/min/1.73m2   CBC with platelets and differential    Collection Time: 07/20/21  4:08 PM   Result Value Ref Range    WBC Count 5.1 4.0 - 11.0 10e3/uL    RBC Count 3.33 (L) 3.80 - 5.20 10e6/uL    Hemoglobin 10.7 (L) 11.7 - 15.7 g/dL    Hematocrit 33.2 (L) 35.0 - 47.0 %     78 - 100 fL    MCH 32.1 26.5 - 33.0 pg    MCHC 32.2 31.5 - 36.5 g/dL    RDW 13.7 10.0 - 15.0 %    Platelet Count 150 150 - 450 10e3/uL    % Neutrophils 76 %    % Lymphocytes 13 %    % Monocytes 10 %    % Eosinophils 0 %    % Basophils 1 %    % Immature Granulocytes 0 %    NRBCs per 100 WBC 0 <1 /100    Absolute Neutrophils 3.9 1.6 - 8.3 10e3/uL    Absolute Lymphocytes 0.6 (L) 0.8 - 5.3 10e3/uL    Absolute Monocytes 0.5 0.0 - 1.3 10e3/uL    Absolute Eosinophils 0.0 0.0 - 0.7 10e3/uL    Absolute Basophils 0.0 0.0 - 0.2 10e3/uL    Absolute Immature Granulocytes 0.0 <=0.0 10e3/uL    Absolute NRBCs 0.0 10e3/uL   Adult Type and Screen    Collection Time: 07/20/21  4:08 PM   Result Value Ref Range    ABO/RH(D) AB POS     Antibody Screen Negative Negative    SPECIMEN EXPIRATION DATE [id number]    SARS-COV2 (COVID-19) Virus RT-PCR    Collection Time: 07/20/21  4:12 PM    Specimen: Nasopharyngeal; Swab   Result Value Ref Range    SARS CoV2 PCR Negative Negative   UA with Microscopic reflex to Culture    Collection Time: 07/21/21  5:56 AM    Specimen: Urine, Midstream   Result Value Ref Range    Color Urine Light Yellow Colorless, Straw, Light Yellow, Yellow    Appearance Urine Clear Clear    Glucose Urine Negative Negative mg/dL    Bilirubin Urine Negative Negative    Ketones Urine Negative Negative mg/dL    Specific Gravity Urine 1.024 1.003 - 1.035    Blood Urine Negative Negative    pH Urine 6.0 5.0  - 7.0    Protein Albumin Urine Negative Negative mg/dL    Urobilinogen Urine Normal Normal, 2.0 mg/dL    Nitrite Urine Negative Negative    Leukocyte Esterase Urine Negative Negative    Mucus Urine Present (A) None Seen /LPF    RBC Urine 1 <=2 /HPF    WBC Urine 2 <=5 /HPF    Squamous Epithelials Urine <1 <=1 /HPF   CBC with platelets    Collection Time: 07/21/21  6:11 AM   Result Value Ref Range    WBC Count 4.9 4.0 - 11.0 10e3/uL    RBC Count 3.53 (L) 3.80 - 5.20 10e6/uL    Hemoglobin 11.5 (L) 11.7 - 15.7 g/dL    Hematocrit 36.5 35.0 - 47.0 %     (H) 78 - 100 fL    MCH 32.6 26.5 - 33.0 pg    MCHC 31.5 31.5 - 36.5 g/dL    RDW 13.7 10.0 - 15.0 %    Platelet Count 178 150 - 450 10e3/uL   Basic metabolic panel    Collection Time: 07/21/21  6:11 AM   Result Value Ref Range    Sodium 142 133 - 144 mmol/L    Potassium 3.6 3.4 - 5.3 mmol/L    Chloride 111 (H) 94 - 109 mmol/L    Carbon Dioxide (CO2) 27 20 - 32 mmol/L    Anion Gap 4 3 - 14 mmol/L    Urea Nitrogen 20 7 - 30 mg/dL    Creatinine 0.58 0.52 - 1.04 mg/dL    Calcium 8.4 (L) 8.5 - 10.1 mg/dL    Glucose 87 70 - 99 mg/dL    GFR Estimate 86 >60 mL/min/1.73m2       ASSESSMENT/Plan:     Diagnosis Comments   1. Periprosthetic fracture of right hip, subsequent encounter   pain, she says, is bearable   2. Fall, subsequent encounter     3. History of malignant neoplasm of anus   in remission   4. Memory problem   fairly significant amount of confusion   5. Atrophic vaginitis   utilizes topical creams       CHANGES:    None.    CARE PLAN:    The care plan, medications, vital signs, orders, and nursing notes have been reviewed, and all orders signed. Changes to care plan, if any, as noted. Otherwise, continue current plan of care.    The above has been created using voice recognition software. Please be aware that this may unintentionally  produce inaccuracies and/or nonsensical sentences.      Electronically signed by: GRAHAM Trejo  CNP            Sincerely,        GRAHAM Trejo CNP

## 2021-07-26 NOTE — PROGRESS NOTES
"Community Paramedic Program  Community Health Worker Follow Up    Intervention and Education during outreach:     Called pt's sister Sahil back. Her  Hugo picked up the phone and said that Sahil was \"visiting with Elizabeth right now.\" He mentioned that Sahil said \"she wanted to talk to you and give you Elizabeth's phone number at the place she went in [place] after being released from the hospital.\" He asked if I would like the number, and I agreed. Wrote it down and thanked him for sharing. I asked him to please let Sahil know that I called and that I would like her to give me a call next week to connect. Hugo said he would leave a message for Sahil.    Called pt's room phone at [facility]. She answered and I heard pt's sister in the background. Pt said \"It's so nice to hear your voice. Sahil is here with me right now.\" I let her know that I have been thinking about her and am glad that she was in the hospital to get the care she needed after her fall. Pt said \"I think I'm going to be here for a while, but I'm not sure.\" Pt asked if I could call back next week because \"Sahil and I have some things to discuss and then there's another lady who is coming to see me this afternoon.\" I agreed and offered to call back on Monday. Pt said \"that would be wonderful, and thank you for checking on me.\"     Sent Shayy Bailey, the Director of the [facility] Nurse Program, a secure email with an update about pt. I thanked her for coordinating with me and asked her to please let the RN Jessica know that pt did end up calling 911 and going to the hospital. I told Shayy that pt is currently in a SNF and that I will keep her updated once I hear more about the long term plan for pt.     CHW Plan:   1. CP CHW will call pt on Monday, July 26th at her room phone at [facility].    "

## 2021-07-27 NOTE — PROGRESS NOTES
"Community Paramedic Program  Community Health Worker Follow Up    Intervention and Education during outreach:     Pt called. When I answered, she asked \"is this the person who was coming to visit me at my house?\" I confirmed that yes, I had been visiting her, and I noted the last time I saw her, the day after she fell. She said \"I don't really remember that. I have blanks in my mind, it is the most bizarre thing.\" She asked if I was part of the [facility] Nurse Program ([facility]), and I told her no. I told her that I was the person who helped connect her with the HBNP so she could get a bath and foot care in home. I told her that I am part of her care team and work with her PCP, Dr. Henderson. She said \"thank you for that, that helps me. I am going to write that down.\"     As we were talking, I heard other voices in the background. Pt asked if I could wait. When she came back on the phone, she said \"there are so many people in and out of my room and I am having a hard time keeping track of them, who they are and their names.\" I asked how pt's experience so far has been at [facility], and she initially said \"I'm a good place.\" As pt continued speaking, however, she started expressing concerns about \"being forgotten.\" According to pt she's had \"a few afternoons or evenings where I have called for help but no one came.\" She said \"they told me not to, but I ended up transferring myself from the bed into the wheelchair in my room.\" She said \"the person who just came in here now is going to get a new wheelchair for me because the one they gave me is broken.\" She also added that the TV \"doesn't work but supposedly I should have a new working one by the weekend.\" I asked if she's been able to get a bath since being there, and she said \"not yet.\" She said she wasn't sure if that was something they offered, and I told that yes, they do. I encouraged her to ask, as I know it has been quite some time since pt has been " "able to bathe. I asked if there was one staff person who she felt comfortable asking, and she said yes. She said \"that would be so nice. I've only been able to use a washcloth to give myself a sort of sponge bath in bed.\" I encouraged her to speak up for what she needs while she's there, and she said she will. Pt said she has her own room, and \"it's pretty quiet here.\"      I noted that her sister Sahil had been there on Friday, when I called. I asked if they'd been coming to visit her, and she said \"well they were but now they just parked me here for a month.\" She said \"Sahil and Hugo are out of town for their square dancing. Katherine is somewhere up north or something.\" She said \"I feel like a burden, they've been spending so much time with me. I'm glad they're able to get a break.\" She said she is \"feeling lonely.\" Pt said that she found a book there called \"[place] Someone with Dementia\" that she plans to read to \"keep busy and try to learn while I'm here.\"     I let pt know how happy I was to speak with her and that I've been thinking about her. I told her that her care team is cheering her on and hoping that she'll regain her strength and eventually be able to get back home. She said \"thank you for that, that helps lift my spirits.\" She said that \"I have been walking the halls with some help, which feels good. I need to keep moving and working.\" Pt added that she's been \"thinking about all the stuff in my apartment and how it's too crowded.\" She said she wasn't sure how \"I am going to go through all of my things, but I will need to make some changes when I get back home.\" I thanked her for sharing with me and offered my support to navigate this. I told Elizabeth I will note this for the future and we can discuss at a later time. She agreed. Pt asked me to pass along a note to her PCP and \"tell him hi and let him know that I am going to be okay.\" I said I would.     Asked pt if I could call her next week to check in, and " "she said \"I would love that.\" Told her I hope she continues to feel better and to please reach out if she has questions or needs anything from me in the meantime.    CHW Plan:   1. Pt will ask [facility] staff if she can get a bath this week.  2. Pt will continue walking the halls with staff support and working on regaining her strength.  3. Pt will contact the CP CHW with questions or updates.  4. CP CHW will follow up with pt by phone next week.  "

## 2021-07-27 NOTE — PROGRESS NOTES
"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  She had tried managing right hip pain as an outpatient, but it soon became unbearable.  X-rays were performed at the direction of her PCP, and they showed \"suspicion of fracture\" of the right hip.  In the ED, a CT showed a periprosthetic fracture.  Orthopedics evaluated patient and advised that she could be weightbearing as tolerated with assistant without the need for surgical intervention.  She was admitted to the ED observation unit and was not treated with scheduled acetaminophen, gabapentin, cyclobenzaprine, Lidoderm patch, and as needed oxycodone.  Her pain was well controlled with these measures.  However, she did have some confusion, and the patient was discussed with her sister, Sahil.  Sahil confirmed that the patient has baseline memory problems.  Patient and ultimately the TCU were advised to only use oxycodone for severe breakthrough pain, as there is concern of worsening her confusion.  Prior to discharge, the cyclobenzaprine was discontinued, and he was discharged to the TCU on the remaining medications.  She " "can be weightbearing as tolerated with a walker and should follow-up with Dr. Phan in 6 weeks for repeat x-rays.      CURRENT/RECENT TCU ISSUES    Disposition: The patient has admitted to some confusion, stating that she did not know where she was.  During my visit with her, speech tended to be rambling.  When asked the year, she answered correctly with a slight delay; however, when asked the month, she SAID, \", no, it's not August yet.  It's June.  My birthday is [birth year].  She apparently forgot that she had a birthday just 12 days ago.    She states that her pain when walking to the bathroom is bearable.  She has not asked for any pain medications and is simply given the scheduled ones.  She may have received a single dose of oxycodone, because she clearly looks in pain.  He continues to have regular bowel movements multiple times per day.    Discharge planning: She lives alone in an apartment.    ROS: No headaches or chest pains, coughing or congestion, nausea or vomiting, dizziness or dyspnea, dysuria, difficulty chewing or swallowing, integumentary issues, or problems with appetite.  Nursing staff tell me that she is not sleeping, but I would prefer to give her a bit of time to adjust to her new surroundings before having a sleep aid.    Past Medical History:   Diagnosis Date     Anal cancer (H)      Endometriosis, site unspecified      History of malignant neoplasm of anus 7/26/2021     Periprosthetic fracture of right hip, subsequent encounter 7/26/2021     Thyroiditis, unspecified     Thryoiditis-resolved              Family History   Problem Relation Age of Onset     Cancer Sister      Cancer Maternal Grandmother         lymphoma     Heart Disease Father         MI     Uterine Cancer Niece      Social History     Socioeconomic History     Marital status: Single     Spouse name: None     Number of children: None     Years of education: None     Highest education level: None   Occupational History     None "   Tobacco Use     Smoking status: Never Smoker     Smokeless tobacco: Never Used   Substance and Sexual Activity     Alcohol use: No     Drug use: No     Sexual activity: Yes     Partners: Male   Other Topics Concern     Parent/sibling w/ CABG, MI or angioplasty before 65F 55M? Not Asked   Social History Narrative     None     Social Determinants of Health     Financial Resource Strain: Medium Risk     Difficulty of Paying Living Expenses: Somewhat hard   Food Insecurity: No Food Insecurity     Worried About Running Out of Food in the Last Year: Never true     Ran Out of Food in the Last Year: Never true   Transportation Needs: No Transportation Needs     Lack of Transportation (Medical): No     Lack of Transportation (Non-Medical): No   Physical Activity: Insufficiently Active     Days of Exercise per Week: 5 days     Minutes of Exercise per Session: 10 min   Stress: Stress Concern Present     Feeling of Stress : To some extent   Social Connections:      Frequency of Communication with Friends and Family:      Frequency of Social Gatherings with Friends and Family:      Attends Hindu Services:      Active Member of Clubs or Organizations:      Attends Club or Organization Meetings:      Marital Status:    Intimate Partner Violence:      Fear of Current or Ex-Partner:      Emotionally Abused:      Physically Abused:      Sexually Abused:        MEDICATIONS: Reviewed from the MAR, physician orders, and/or earlier progress notes.  Post Discharge Medication Reconciliation Status: discharge medications reconciled, continue medications without change.    Current Outpatient Medications   Medication Sig     acetaminophen (TYLENOL) 500 MG tablet Take 1,000 mg by mouth 3 times daily     Calcium-Magnesium-Zinc 333-133-5 MG TABS per tablet Take 1 tablet by mouth daily     cholecalciferol (VITAMIN D3) 5000 units (125 mcg) capsule 1 CAP BY MOUTH DAILY (DX: OSTEOPOROSIS)     conjugated estrogens (PREMARIN) 0.625 MG/GM vaginal  "cream Apply locally to clitoral randall pea sized amount daily for 2 weeks then prn. (Patient not taking: Reported on 6/28/2021)     docusate sodium (COLACE) 100 MG capsule Take 1 capsule (100 mg) by mouth 2 times daily (Patient not taking: Reported on 6/28/2021)     estradiol (ESTRACE) 0.1 MG/GM vaginal cream Apply locally to clitoral randall pea sized amount daily for 2 weeks then prn. (Patient not taking: Reported on 6/28/2021)     gabapentin (NEURONTIN) 300 MG capsule Take 1 capsule (300 mg) by mouth 3 times daily     Lidocaine (LIDOCARE) 4 % Patch Place 2 patches onto the skin every 24 hours To prevent lidocaine toxicity, patient should be patch free for 12 hrs daily.     metroNIDAZOLE (METROGEL) 0.75 % external gel Apply topically 2 times daily     polyethylene glycol (MIRALAX) 17 GM/Dose powder Take 17 g by mouth daily     vitamin B complex with vitamin C (VITAMIN  B COMPLEX) TABS tablet Take 1 tablet by mouth daily     VITAMIN E PO Take 1 capsule by mouth daily     Current Facility-Administered Medications   Medication     heparin 100 UNIT/ML injection 500 Units     ALLERGIES:   Allergies   Allergen Reactions     Darvon [Propoxyphene]      Had reaction in teen years     Ketoconazole Rash     Penicillins      As a child     DIET: Regular, regular texture, thin liquids.    Vitals:    07/26/21 1254   BP: 123/76   Pulse: 88   Resp: 18   Temp: 97.8  F (36.6  C)   SpO2: 96%   Weight: 51.2 kg (112 lb 12.8 oz)   Height: 1.626 m (5' 4\")     Body mass index is 19.36 kg/m .    EXAMINATION:   General: Pleasant, very loquacious (and rather funny), frail-appearing elderly female, lying in bed and initially found talking on the phone, in no apparent distress.  Her conversation is often rambling.  Head: Normocephalic and atraumatic.   Eyes: PERRLA, sclerae clear.  Eyes are a bit red rimmed.  ENT: Moist oral mucosa.  She has many of her own teeth.  No rhinorrhea or nasal discharge.  Hearing appears to be quite " good.  Cardiovascular: Regular rate and rhythm with intermittent ectopy and no appreciable murmur.   Respiratory: Lungs clear to auscultation bilaterally.   Abdomen: Nondistended.   Musculoskeletal/Extremities: Age-related degenerative joint disease.  Bilateral mild hallux valgus deformities and hammertoes.  Currently, somewhat mild lower extremity edema.  Port-A-Cath in the right upper chest wall.  Integument: Hypertrophic lower extremity skin from venous stasis.  Otherwise, no rashes, clinically significant lesions, or skin breakdown.   Cognitive/Psychiatric: Clearly trying to have some cognitive deficits, though affect is quite upbeat.    DIAGNOSTICS:   Recent Results (from the past 240 hour(s))   EKG 12-lead, tracing only    Collection Time: 07/20/21  3:57 PM   Result Value Ref Range    Systolic Blood Pressure  mmHg    Diastolic Blood Pressure  mmHg    Ventricular Rate 59 BPM    Atrial Rate 59 BPM    NE Interval 148 ms    QRS Duration 88 ms     ms    QTc 413 ms    P Axis 83 degrees    R AXIS 43 degrees    T Axis 70 degrees    Interpretation ECG       Sinus bradycardia  Minimal voltage criteria for LVH, may be normal variant  Borderline ECG    Unconfirmed report - interpretation of this ECG is computer generated - see medical record for final interpretation  Confirmed by - EMERGENCY ROOM, PHYSICIAN ([id number]),  STU BARRIOS ([id number]) on 7/21/2021 11:36:59 AM     INR    Collection Time: 07/20/21  4:08 PM   Result Value Ref Range    INR 1.02 0.85 - 1.15   Comprehensive metabolic panel    Collection Time: 07/20/21  4:08 PM   Result Value Ref Range    Sodium 139 133 - 144 mmol/L    Potassium 3.8 3.4 - 5.3 mmol/L    Chloride 108 94 - 109 mmol/L    Carbon Dioxide (CO2) 30 20 - 32 mmol/L    Anion Gap 1 (L) 3 - 14 mmol/L    Urea Nitrogen 22 7 - 30 mg/dL    Creatinine 0.64 0.52 - 1.04 mg/dL    Calcium 9.1 8.5 - 10.1 mg/dL    Glucose 71 70 - 99 mg/dL    Alkaline Phosphatase 83 40 - 150 U/L    AST 31 0 - 45 U/L     ALT 65 (H) 0 - 50 U/L    Protein Total 6.9 6.8 - 8.8 g/dL    Albumin 3.6 3.4 - 5.0 g/dL    Bilirubin Total 1.2 0.2 - 1.3 mg/dL    GFR Estimate 83 >60 mL/min/1.73m2   CBC with platelets and differential    Collection Time: 07/20/21  4:08 PM   Result Value Ref Range    WBC Count 5.1 4.0 - 11.0 10e3/uL    RBC Count 3.33 (L) 3.80 - 5.20 10e6/uL    Hemoglobin 10.7 (L) 11.7 - 15.7 g/dL    Hematocrit 33.2 (L) 35.0 - 47.0 %     78 - 100 fL    MCH 32.1 26.5 - 33.0 pg    MCHC 32.2 31.5 - 36.5 g/dL    RDW 13.7 10.0 - 15.0 %    Platelet Count 150 150 - 450 10e3/uL    % Neutrophils 76 %    % Lymphocytes 13 %    % Monocytes 10 %    % Eosinophils 0 %    % Basophils 1 %    % Immature Granulocytes 0 %    NRBCs per 100 WBC 0 <1 /100    Absolute Neutrophils 3.9 1.6 - 8.3 10e3/uL    Absolute Lymphocytes 0.6 (L) 0.8 - 5.3 10e3/uL    Absolute Monocytes 0.5 0.0 - 1.3 10e3/uL    Absolute Eosinophils 0.0 0.0 - 0.7 10e3/uL    Absolute Basophils 0.0 0.0 - 0.2 10e3/uL    Absolute Immature Granulocytes 0.0 <=0.0 10e3/uL    Absolute NRBCs 0.0 10e3/uL   Adult Type and Screen    Collection Time: 07/20/21  4:08 PM   Result Value Ref Range    ABO/RH(D) AB POS     Antibody Screen Negative Negative    SPECIMEN EXPIRATION DATE [id number]    SARS-COV2 (COVID-19) Virus RT-PCR    Collection Time: 07/20/21  4:12 PM    Specimen: Nasopharyngeal; Swab   Result Value Ref Range    SARS CoV2 PCR Negative Negative   UA with Microscopic reflex to Culture    Collection Time: 07/21/21  5:56 AM    Specimen: Urine, Midstream   Result Value Ref Range    Color Urine Light Yellow Colorless, Straw, Light Yellow, Yellow    Appearance Urine Clear Clear    Glucose Urine Negative Negative mg/dL    Bilirubin Urine Negative Negative    Ketones Urine Negative Negative mg/dL    Specific Gravity Urine 1.024 1.003 - 1.035    Blood Urine Negative Negative    pH Urine 6.0 5.0 - 7.0    Protein Albumin Urine Negative Negative mg/dL    Urobilinogen Urine Normal Normal, 2.0  mg/dL    Nitrite Urine Negative Negative    Leukocyte Esterase Urine Negative Negative    Mucus Urine Present (A) None Seen /LPF    RBC Urine 1 <=2 /HPF    WBC Urine 2 <=5 /HPF    Squamous Epithelials Urine <1 <=1 /HPF   CBC with platelets    Collection Time: 07/21/21  6:11 AM   Result Value Ref Range    WBC Count 4.9 4.0 - 11.0 10e3/uL    RBC Count 3.53 (L) 3.80 - 5.20 10e6/uL    Hemoglobin 11.5 (L) 11.7 - 15.7 g/dL    Hematocrit 36.5 35.0 - 47.0 %     (H) 78 - 100 fL    MCH 32.6 26.5 - 33.0 pg    MCHC 31.5 31.5 - 36.5 g/dL    RDW 13.7 10.0 - 15.0 %    Platelet Count 178 150 - 450 10e3/uL   Basic metabolic panel    Collection Time: 07/21/21  6:11 AM   Result Value Ref Range    Sodium 142 133 - 144 mmol/L    Potassium 3.6 3.4 - 5.3 mmol/L    Chloride 111 (H) 94 - 109 mmol/L    Carbon Dioxide (CO2) 27 20 - 32 mmol/L    Anion Gap 4 3 - 14 mmol/L    Urea Nitrogen 20 7 - 30 mg/dL    Creatinine 0.58 0.52 - 1.04 mg/dL    Calcium 8.4 (L) 8.5 - 10.1 mg/dL    Glucose 87 70 - 99 mg/dL    GFR Estimate 86 >60 mL/min/1.73m2       ASSESSMENT/Plan:     Diagnosis Comments   1. Periprosthetic fracture of right hip, subsequent encounter   pain, she says, is bearable   2. Fall, subsequent encounter     3. History of malignant neoplasm of anus   in remission   4. Memory problem   fairly significant amount of confusion   5. Atrophic vaginitis   utilizes topical creams       CHANGES:    None.    CARE PLAN:    The care plan, medications, vital signs, orders, and nursing notes have been reviewed, and all orders signed. Changes to care plan, if any, as noted. Otherwise, continue current plan of care.    The above has been created using voice recognition software. Please be aware that this may unintentionally  produce inaccuracies and/or nonsensical sentences.      Electronically signed by: GRAHAM Trejo CNP

## 2021-07-29 ENCOUNTER — TRANSITIONAL CARE UNIT VISIT (OUTPATIENT)
Dept: GERIATRICS | Facility: CLINIC | Age: 81
End: 2021-07-29
Payer: MEDICARE

## 2021-07-29 VITALS
OXYGEN SATURATION: 96 % | RESPIRATION RATE: 18 BRPM | WEIGHT: 117.4 LBS | SYSTOLIC BLOOD PRESSURE: 125 MMHG | HEART RATE: 75 BPM | TEMPERATURE: 98.9 F | HEIGHT: 64 IN | DIASTOLIC BLOOD PRESSURE: 70 MMHG | BODY MASS INDEX: 20.04 KG/M2

## 2021-07-29 DIAGNOSIS — R41.3 MEMORY PROBLEM: ICD-10-CM
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
## 2021-07-29 DIAGNOSIS — Z85.048 HISTORY OF MALIGNANT NEOPLASM OF ANUS: ICD-10-CM

## 2021-07-29 DIAGNOSIS — N95.2 ATROPHIC VAGINITIS: ICD-10-CM

## 2021-07-29 DIAGNOSIS — M97.8XXD PERIPROSTHETIC FRACTURE OF HIP, SUBSEQUENT ENCOUNTER: Primary | ICD-10-CM

## 2021-07-29 DIAGNOSIS — Z96.649 PERIPROSTHETIC FRACTURE OF HIP, SUBSEQUENT ENCOUNTER: Primary | ICD-10-CM

## 2021-07-29 PROCEDURE — 99309 SBSQ NF CARE MODERATE MDM 30: CPT | Performed by: NURSE PRACTITIONER

## 2021-07-29 ASSESSMENT — MIFFLIN-ST. JEOR: SCORE: 977.52

## 2021-07-29 NOTE — LETTER
"    2021        RE: Elizabeth Taylor  [address]  [address]  [place] [address]        [facility]    Name:   Elizabeth Taylor  :   1940  MRN:    [id number]     Facility:   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  She had tried managing right hip pain as an outpatient, but it soon became unbearable.  X-rays were performed at the direction of her PCP, and they showed \"suspicion of fracture\" of the right hip.  In the ED, a CT showed a periprosthetic fracture.  Orthopedics evaluated patient and advised that she could be weightbearing as tolerated with assistant without the need for surgical intervention.  She was admitted to the ED observation unit and was not treated with scheduled acetaminophen, gabapentin, cyclobenzaprine, Lidoderm patch, and as needed oxycodone.  Her pain was well controlled with these measures.  However, she did have some confusion, and the patient was discussed with her sister, Sahil.  Sahil confirmed that the patient has baseline memory problems.  Patient and ultimately the TCU were advised to only use oxycodone for severe breakthrough pain, as there is concern of worsening her " "confusion.  Prior to discharge, the cyclobenzaprine was discontinued, and he was discharged to the TCU on the remaining medications.  She can be weightbearing as tolerated with a walker and should follow-up with Dr. Phan in 6 weeks for repeat x-rays.      CURRENT/RECENT TCU ISSUES    Disposition: Initial visit, the patient admitted to some confusion, stating that she did not know where she was.  Speech tended to be rambling.  When asked the year, she answered correctly with a slight delay; however, when asked the month, she said, \"August, no, it's not August yet.  It's June.  My birthday is [birth year].  She apparently forgot that she had a birthday just 12 days before.    When last seen, she stated that her pain when walking to the bathroom is bearable.  Today, she tells me she is doing \"amazingly well,\" and she feels \"really, really good.\"  She has not asked for any pain medications and is simply given the scheduled ones.  She continues to have regular bowel movements multiple times per day.    She tells me that something will \"went in my lower back on the left-hand side yesterday.\"  I suspect this should improve without further intervention, but we will monitor.  She tells me she is very tired walking.    Discharge planning: She lives alone in an apartment.    ROS: No headaches or chest pains, coughing or congestion, nausea or vomiting, dizziness or dyspnea, dysuria, difficulty chewing or swallowing, integumentary issues, or problems with appetite.  The patient and nursing staff tell me that she is not sleeping.  She stated that it's normal for her.    Past Medical History:   Diagnosis Date     Anal cancer (H)      Endometriosis, site unspecified      History of malignant neoplasm of anus 7/26/2021     Periprosthetic fracture of right hip, subsequent encounter 7/26/2021     Thyroiditis, unspecified     Thryoiditis-resolved              Family History   Problem Relation Age of Onset     Cancer Sister      Cancer " Maternal Grandmother         lymphoma     Heart Disease Father         MI     Uterine Cancer Niece      Social History     Socioeconomic History     Marital status: Single     Spouse name: None     Number of children: None     Years of education: None     Highest education level: None   Occupational History     None   Tobacco Use     Smoking status: Never Smoker     Smokeless tobacco: Never Used   Substance and Sexual Activity     Alcohol use: No     Drug use: No     Sexual activity: Yes     Partners: Male   Other Topics Concern     Parent/sibling w/ CABG, MI or angioplasty before 65F 55M? Not Asked   Social History Narrative     None     Social Determinants of Health     Financial Resource Strain: Medium Risk     Difficulty of Paying Living Expenses: Somewhat hard   Food Insecurity: No Food Insecurity     Worried About Running Out of Food in the Last Year: Never true     Ran Out of Food in the Last Year: Never true   Transportation Needs: No Transportation Needs     Lack of Transportation (Medical): No     Lack of Transportation (Non-Medical): No   Physical Activity: Insufficiently Active     Days of Exercise per Week: 5 days     Minutes of Exercise per Session: 10 min   Stress: Stress Concern Present     Feeling of Stress : To some extent   Social Connections:      Frequency of Communication with Friends and Family:      Frequency of Social Gatherings with Friends and Family:      Attends Orthodox Services:      Active Member of Clubs or Organizations:      Attends Club or Organization Meetings:      Marital Status:    Intimate Partner Violence:      Fear of Current or Ex-Partner:      Emotionally Abused:      Physically Abused:      Sexually Abused:        MEDICATIONS: Reviewed from the MAR, physician orders, and/or earlier progress notes.  Post Discharge Medication Reconciliation Status: medication reconcilation previously completed during another office visit.    Current Outpatient Medications   Medication Sig  "    acetaminophen (TYLENOL) 500 MG tablet Take 1,000 mg by mouth 3 times daily     Calcium-Magnesium-Zinc 333-133-5 MG TABS per tablet Take 1 tablet by mouth daily     cholecalciferol (VITAMIN D3) 5000 units (125 mcg) capsule 1 CAP BY MOUTH DAILY (DX: OSTEOPOROSIS)     conjugated estrogens (PREMARIN) 0.625 MG/GM vaginal cream Apply locally to clitoral randall pea sized amount daily for 2 weeks then prn. (Patient not taking: Reported on 6/28/2021)     docusate sodium (COLACE) 100 MG capsule Take 1 capsule (100 mg) by mouth 2 times daily (Patient not taking: Reported on 6/28/2021)     estradiol (ESTRACE) 0.1 MG/GM vaginal cream Apply locally to clitoral randall pea sized amount daily for 2 weeks then prn. (Patient not taking: Reported on 6/28/2021)     gabapentin (NEURONTIN) 300 MG capsule Take 1 capsule (300 mg) by mouth 3 times daily     Lidocaine (LIDOCARE) 4 % Patch Place 2 patches onto the skin every 24 hours To prevent lidocaine toxicity, patient should be patch free for 12 hrs daily.     metroNIDAZOLE (METROGEL) 0.75 % external gel Apply topically 2 times daily     polyethylene glycol (MIRALAX) 17 GM/Dose powder Take 17 g by mouth daily     vitamin B complex with vitamin C (VITAMIN  B COMPLEX) TABS tablet Take 1 tablet by mouth daily     VITAMIN E PO Take 1 capsule by mouth daily     Current Facility-Administered Medications   Medication     heparin 100 UNIT/ML injection 500 Units     ALLERGIES:   Allergies   Allergen Reactions     Darvon [Propoxyphene]      Had reaction in teen years     Ketoconazole Rash     Penicillins Hives     As a child     DIET: Regular, regular texture, thin liquids.    Vitals:    07/29/21 1231   BP: 125/70   Pulse: 75   Resp: 18   Temp: 98.9  F (37.2  C)   SpO2: 96%   Weight: 53.3 kg (117 lb 6.4 oz)   Height: 1.626 m (5' 4\")     Body mass index is 20.15 kg/m .    EXAMINATION:   General: Pleasant, very loquacious (and rather funny), frail-appearing elderly female, lying in bed, in no apparent " distress.  Conversation is much more coherent today.  Head: Normocephalic and atraumatic.   Eyes: PERRLA, sclerae clear.  Eyes are a bit red rimmed.  ENT: Moist oral mucosa.  She has many of her own teeth.  No rhinorrhea or nasal discharge.  Hearing appears to be quite good.  Cardiovascular: Regular rate and rhythm with intermittent ectopy and no appreciable murmur.   Respiratory: Lungs clear to auscultation bilaterally.   Abdomen: Nondistended.   Musculoskeletal/Extremities: Age-related degenerative joint disease.  Bilateral mild hallux valgus deformities and hammertoes.  Currently, somewhat mild lower extremity edema.  Port-A-Cath in the right upper chest wall.  Integument: Hypertrophic lower extremity skin from venous stasis.  Otherwise, no rashes, clinically significant lesions, or skin breakdown.   Cognitive/Psychiatric: Clearly trying to have some cognitive deficits, though affect is quite upbeat.    DIAGNOSTICS:   Recent Results (from the past 240 hour(s))   UA with Microscopic reflex to Culture    Collection Time: 07/21/21  5:56 AM    Specimen: Urine, Midstream   Result Value Ref Range    Color Urine Light Yellow Colorless, Straw, Light Yellow, Yellow    Appearance Urine Clear Clear    Glucose Urine Negative Negative mg/dL    Bilirubin Urine Negative Negative    Ketones Urine Negative Negative mg/dL    Specific Gravity Urine 1.024 1.003 - 1.035    Blood Urine Negative Negative    pH Urine 6.0 5.0 - 7.0    Protein Albumin Urine Negative Negative mg/dL    Urobilinogen Urine Normal Normal, 2.0 mg/dL    Nitrite Urine Negative Negative    Leukocyte Esterase Urine Negative Negative    Mucus Urine Present (A) None Seen /LPF    RBC Urine 1 <=2 /HPF    WBC Urine 2 <=5 /HPF    Squamous Epithelials Urine <1 <=1 /HPF   CBC with platelets    Collection Time: 07/21/21  6:11 AM   Result Value Ref Range    WBC Count 4.9 4.0 - 11.0 10e3/uL    RBC Count 3.53 (L) 3.80 - 5.20 10e6/uL    Hemoglobin 11.5 (L) 11.7 - 15.7 g/dL     Hematocrit 36.5 35.0 - 47.0 %     (H) 78 - 100 fL    MCH 32.6 26.5 - 33.0 pg    MCHC 31.5 31.5 - 36.5 g/dL    RDW 13.7 10.0 - 15.0 %    Platelet Count 178 150 - 450 10e3/uL   Basic metabolic panel    Collection Time: 07/21/21  6:11 AM   Result Value Ref Range    Sodium 142 133 - 144 mmol/L    Potassium 3.6 3.4 - 5.3 mmol/L    Chloride 111 (H) 94 - 109 mmol/L    Carbon Dioxide (CO2) 27 20 - 32 mmol/L    Anion Gap 4 3 - 14 mmol/L    Urea Nitrogen 20 7 - 30 mg/dL    Creatinine 0.58 0.52 - 1.04 mg/dL    Calcium 8.4 (L) 8.5 - 10.1 mg/dL    Glucose 87 70 - 99 mg/dL    GFR Estimate 86 >60 mL/min/1.73m2       ASSESSMENT/Plan:     Diagnosis Comments   1. Periprosthetic fracture of right hip, subsequent encounter   pain, she says, is bearable   2. Fall, subsequent encounter     3. History of malignant neoplasm of anus   in remission   4. Memory problem   fairly significant amount of confusion   5. Atrophic vaginitis   utilizes topical creams       CHANGES:    None.    CARE PLAN:    The care plan, medications, vital signs, orders, and nursing notes have been reviewed, and all orders signed. Changes to care plan, if any, as noted. Otherwise, continue current plan of care.    The above has been created using voice recognition software. Please be aware that this may unintentionally  produce inaccuracies and/or nonsensical sentences.      Electronically signed by: GRAHAM Trejo CNP        Sincerely,        GRAHAM Trejo CNP

## 2021-07-31 NOTE — PROGRESS NOTES
"[facility]    Name:   Elizabeth Taylor  :   1940  MRN:    [id number]     Facility:   [facility] ([facility]) [[id number]]   Room:   Code Status: FULL CODE and POLST AVAILABLE -     DOS: 2021  Previous visit: 2021    PCP:  Jann Henderson    CHIEF COMPLAINT / REASON FOR VISIT:  Chief Complaint   Patient presents with     Clinic Care Coordination - Follow-up     Periprosthetic fracture of the right hip      [facility] from 2021 until 2021 (right periprosthetic hip fracture)      HPI: Elizabeth is a 81 year old female with a past medical history significant for 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  She had tried managing right hip pain as an outpatient, but it soon became unbearable.  X-rays were performed at the direction of her PCP, and they showed \"suspicion of fracture\" of the right hip.  In the ED, a CT showed a periprosthetic fracture.  Orthopedics evaluated patient and advised that she could be weightbearing as tolerated with assistant without the need for surgical intervention.  She was admitted to the ED observation unit and was not treated with scheduled acetaminophen, gabapentin, cyclobenzaprine, Lidoderm patch, and as needed oxycodone.  Her pain was well controlled with these measures.  However, she did have some confusion, and the patient was discussed with her sister, Sahil.  Sahil confirmed that the patient has baseline memory problems.  Patient and ultimately the TCU were advised to only use oxycodone for severe breakthrough pain, as there is concern of worsening her confusion.  Prior to discharge, the cyclobenzaprine was discontinued, and he was discharged to the TCU on " "the remaining medications.  She can be weightbearing as tolerated with a walker and should follow-up with Dr. Phan in 6 weeks for repeat x-rays.      CURRENT/RECENT TCU ISSUES    Disposition: Initial visit, the patient admitted to some confusion, stating that she did not know where she was.  Speech tended to be rambling.  When asked the year, she answered correctly with a slight delay; however, when asked the month, she said, \"August, no, it's not August yet.  It's June.  My birthday is [birth year].  She apparently forgot that she had a birthday just 12 days before.    When last seen, she stated that her pain when walking to the bathroom is bearable.  Today, she tells me she is doing \"amazingly well,\" and she feels \"really, really good.\"  She has not asked for any pain medications and is simply given the scheduled ones.  She continues to have regular bowel movements multiple times per day.    She tells me that something will \"went in my lower back on the left-hand side yesterday.\"  I suspect this should improve without further intervention, but we will monitor.  She tells me she is very tired walking.    Discharge planning: She lives alone in an apartment.    ROS: No headaches or chest pains, coughing or congestion, nausea or vomiting, dizziness or dyspnea, dysuria, difficulty chewing or swallowing, integumentary issues, or problems with appetite.  The patient and nursing staff tell me that she is not sleeping.  She stated that it's normal for her.    Past Medical History:   Diagnosis Date     Anal cancer (H)      Endometriosis, site unspecified      History of malignant neoplasm of anus 7/26/2021     Periprosthetic fracture of right hip, subsequent encounter 7/26/2021     Thyroiditis, unspecified     Thryoiditis-resolved              Family History   Problem Relation Age of Onset     Cancer Sister      Cancer Maternal Grandmother         lymphoma     Heart Disease Father         MI     Uterine Cancer Niece  "     Social History     Socioeconomic History     Marital status: Single     Spouse name: None     Number of children: None     Years of education: None     Highest education level: None   Occupational History     None   Tobacco Use     Smoking status: Never Smoker     Smokeless tobacco: Never Used   Substance and Sexual Activity     Alcohol use: No     Drug use: No     Sexual activity: Yes     Partners: Male   Other Topics Concern     Parent/sibling w/ CABG, MI or angioplasty before 65F 55M? Not Asked   Social History Narrative     None     Social Determinants of Health     Financial Resource Strain: Medium Risk     Difficulty of Paying Living Expenses: Somewhat hard   Food Insecurity: No Food Insecurity     Worried About Running Out of Food in the Last Year: Never true     Ran Out of Food in the Last Year: Never true   Transportation Needs: No Transportation Needs     Lack of Transportation (Medical): No     Lack of Transportation (Non-Medical): No   Physical Activity: Insufficiently Active     Days of Exercise per Week: 5 days     Minutes of Exercise per Session: 10 min   Stress: Stress Concern Present     Feeling of Stress : To some extent   Social Connections:      Frequency of Communication with Friends and Family:      Frequency of Social Gatherings with Friends and Family:      Attends Ca[employer] Services:      Active Member of Clubs or Organizations:      Attends Club or Organization Meetings:      Marital Status:    Intimate Partner Violence:      Fear of Current or Ex-Partner:      Emotionally Abused:      Physically Abused:      Sexually Abused:        MEDICATIONS: Reviewed from the MAR, physician orders, and/or earlier progress notes.  Post Discharge Medication Reconciliation Status: medication reconcilation previously completed during another office visit.    Current Outpatient Medications   Medication Sig     acetaminophen (TYLENOL) 500 MG tablet Take 1,000 mg by mouth 3 times daily      "Calcium-Magnesium-Zinc 333-133-5 MG TABS per tablet Take 1 tablet by mouth daily     cholecalciferol (VITAMIN D3) 5000 units (125 mcg) capsule 1 CAP BY MOUTH DAILY (DX: OSTEOPOROSIS)     conjugated estrogens (PREMARIN) 0.625 MG/GM vaginal cream Apply locally to clitoral randall pea sized amount daily for 2 weeks then prn. (Patient not taking: Reported on 6/28/2021)     docusate sodium (COLACE) 100 MG capsule Take 1 capsule (100 mg) by mouth 2 times daily (Patient not taking: Reported on 6/28/2021)     estradiol (ESTRACE) 0.1 MG/GM vaginal cream Apply locally to clitoral randall pea sized amount daily for 2 weeks then prn. (Patient not taking: Reported on 6/28/2021)     gabapentin (NEURONTIN) 300 MG capsule Take 1 capsule (300 mg) by mouth 3 times daily     Lidocaine (LIDOCARE) 4 % Patch Place 2 patches onto the skin every 24 hours To prevent lidocaine toxicity, patient should be patch free for 12 hrs daily.     metroNIDAZOLE (METROGEL) 0.75 % external gel Apply topically 2 times daily     polyethylene glycol (MIRALAX) 17 GM/Dose powder Take 17 g by mouth daily     vitamin B complex with vitamin C (VITAMIN  B COMPLEX) TABS tablet Take 1 tablet by mouth daily     VITAMIN E PO Take 1 capsule by mouth daily     Current Facility-Administered Medications   Medication     heparin 100 UNIT/ML injection 500 Units     ALLERGIES:   Allergies   Allergen Reactions     Darvon [Propoxyphene]      Had reaction in teen years     Ketoconazole Rash     Penicillins Hives     As a child     DIET: Regular, regular texture, thin liquids.    Vitals:    07/29/21 1231   BP: 125/70   Pulse: 75   Resp: 18   Temp: 98.9  F (37.2  C)   SpO2: 96%   Weight: 53.3 kg (117 lb 6.4 oz)   Height: 1.626 m (5' 4\")     Body mass index is 20.15 kg/m .    EXAMINATION:   General: Pleasant, very loquacious (and rather funny), frail-appearing elderly female, lying in bed, in no apparent distress.  Conversation is much more coherent today.  Head: Normocephalic and " atraumatic.   Eyes: PERRLA, sclerae clear.  Eyes are a bit red rimmed.  ENT: Moist oral mucosa.  She has many of her own teeth.  No rhinorrhea or nasal discharge.  Hearing appears to be quite good.  Cardiovascular: Regular rate and rhythm with intermittent ectopy and no appreciable murmur.   Respiratory: Lungs clear to auscultation bilaterally.   Abdomen: Nondistended.   Musculoskeletal/Extremities: Age-related degenerative joint disease.  Bilateral mild hallux valgus deformities and hammertoes.  Currently, somewhat mild lower extremity edema.  Port-A-Cath in the right upper chest wall.  Integument: Hypertrophic lower extremity skin from venous stasis.  Otherwise, no rashes, clinically significant lesions, or skin breakdown.   Cognitive/Psychiatric: Clearly trying to have some cognitive deficits, though affect is quite upbeat.    DIAGNOSTICS:   Recent Results (from the past 240 hour(s))   UA with Microscopic reflex to Culture    Collection Time: 07/21/21  5:56 AM    Specimen: Urine, Midstream   Result Value Ref Range    Color Urine Light Yellow Colorless, Straw, Light Yellow, Yellow    Appearance Urine Clear Clear    Glucose Urine Negative Negative mg/dL    Bilirubin Urine Negative Negative    Ketones Urine Negative Negative mg/dL    Specific Gravity Urine 1.024 1.003 - 1.035    Blood Urine Negative Negative    pH Urine 6.0 5.0 - 7.0    Protein Albumin Urine Negative Negative mg/dL    Urobilinogen Urine Normal Normal, 2.0 mg/dL    Nitrite Urine Negative Negative    Leukocyte Esterase Urine Negative Negative    Mucus Urine Present (A) None Seen /LPF    RBC Urine 1 <=2 /HPF    WBC Urine 2 <=5 /HPF    Squamous Epithelials Urine <1 <=1 /HPF   CBC with platelets    Collection Time: 07/21/21  6:11 AM   Result Value Ref Range    WBC Count 4.9 4.0 - 11.0 10e3/uL    RBC Count 3.53 (L) 3.80 - 5.20 10e6/uL    Hemoglobin 11.5 (L) 11.7 - 15.7 g/dL    Hematocrit 36.5 35.0 - 47.0 %     (H) 78 - 100 fL    MCH 32.6 26.5 -  33.0 pg    MCHC 31.5 31.5 - 36.5 g/dL    RDW 13.7 10.0 - 15.0 %    Platelet Count 178 150 - 450 10e3/uL   Basic metabolic panel    Collection Time: 07/21/21  6:11 AM   Result Value Ref Range    Sodium 142 133 - 144 mmol/L    Potassium 3.6 3.4 - 5.3 mmol/L    Chloride 111 (H) 94 - 109 mmol/L    Carbon Dioxide (CO2) 27 20 - 32 mmol/L    Anion Gap 4 3 - 14 mmol/L    Urea Nitrogen 20 7 - 30 mg/dL    Creatinine 0.58 0.52 - 1.04 mg/dL    Calcium 8.4 (L) 8.5 - 10.1 mg/dL    Glucose 87 70 - 99 mg/dL    GFR Estimate 86 >60 mL/min/1.73m2       ASSESSMENT/Plan:     Diagnosis Comments   1. Periprosthetic fracture of right hip, subsequent encounter   pain, she says, is bearable   2. Fall, subsequent encounter     3. History of malignant neoplasm of anus   in remission   4. Memory problem   fairly significant amount of confusion   5. Atrophic vaginitis   utilizes topical creams       CHANGES:    None.    CARE PLAN:    The care plan, medications, vital signs, orders, and nursing notes have been reviewed, and all orders signed. Changes to care plan, if any, as noted. Otherwise, continue current plan of care.    The above has been created using voice recognition software. Please be aware that this may unintentionally  produce inaccuracies and/or nonsensical sentences.      Electronically signed by: GRAHAM Trejo CNP

## 2021-08-02 ENCOUNTER — TRANSITIONAL CARE UNIT VISIT (OUTPATIENT)
Dept: GERIATRICS | Facility: CLINIC | Age: 81
End: 2021-08-02
Payer: MEDICARE

## 2021-08-02 VITALS
OXYGEN SATURATION: 96 % | DIASTOLIC BLOOD PRESSURE: 71 MMHG | HEART RATE: 76 BPM | SYSTOLIC BLOOD PRESSURE: 116 MMHG | WEIGHT: 117.4 LBS | TEMPERATURE: 98.5 F | RESPIRATION RATE: 18 BRPM | BODY MASS INDEX: 20.15 KG/M2

## 2021-08-02 DIAGNOSIS — M97.8XXD PERIPROSTHETIC FRACTURE OF HIP, SUBSEQUENT ENCOUNTER: Primary | ICD-10-CM

## 2021-08-02 DIAGNOSIS — Z96.649 PERIPROSTHETIC FRACTURE OF HIP, SUBSEQUENT ENCOUNTER: Primary | ICD-10-CM

## 2021-08-02 DIAGNOSIS — Z85.048 HISTORY OF MALIGNANT NEOPLASM OF ANUS: ICD-10-CM

## 2021-08-02 DIAGNOSIS — G31.84 MILD COGNITIVE IMPAIRMENT: ICD-10-CM
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
## 2021-08-02 DIAGNOSIS — N95.2 ATROPHIC VAGINITIS: ICD-10-CM

## 2021-08-02 PROCEDURE — 99309 SBSQ NF CARE MODERATE MDM 30: CPT | Performed by: NURSE PRACTITIONER

## 2021-08-02 NOTE — LETTER
"    2021        RE: Elizabeth Taylor  [address]  [address]  [place] [address]        [facility]    Name:   Elizabeth Taylor  :   1940  MRN:    [id number]     Facility:   [facility] ([facility]) [[id number]]   Room:   Code Status: FULL CODE and POLST AVAILABLE -     DOS: 2021  Previous visit: 2021    PCP:  Jann Henderson    CHIEF COMPLAINT / REASON FOR VISIT:  Chief Complaint   Patient presents with     Clinic Care Coordination - Follow-up     Periprosthetic fracture of the right hip      [facility] from 2021 until 2021 (right periprosthetic hip fracture)      HPI: Elizabeth is a 81 year old female with a past medical history significant for 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  She had tried managing right hip pain as an outpatient, but it soon became unbearable.  X-rays were performed at the direction of her PCP, and they showed \"suspicion of fracture\" of the right hip.  In the ED, a CT showed a periprosthetic fracture.  Orthopedics evaluated patient and advised that she could be weightbearing as tolerated with assistant without the need for surgical intervention.  She was admitted to the ED observation unit and was not treated with scheduled acetaminophen, gabapentin, cyclobenzaprine, Lidoderm patch, and as needed oxycodone.  Her pain was well controlled with these measures.  However, she did have some confusion, and the patient was discussed with her sister, Sahil.  Sahil confirmed that the patient has baseline memory problems.  Patient and ultimately the TCU were advised to only use oxycodone for severe breakthrough pain, as there is concern of worsening her " "confusion.  Prior to discharge, the cyclobenzaprine was discontinued, and he was discharged to the TCU on the remaining medications.  She can be weightbearing as tolerated with a walker and should follow-up with Dr. Phan in 6 weeks for repeat x-rays.      CURRENT/RECENT TCU ISSUES    Disposition: At the initial visit, the patient admitted to some confusion, stating that she did not know where she was.  Speech tended to be rambling.  When asked the year, she answered correctly with a slight delay; however, when asked the month, she said, \"August, no, it's not August yet.  It's June.  My birthday is [birth year].  She apparently forgot that she had a birthday just 12 days before.    She earlier stated that her pain when walking to the bathroom was bearable.  TODAY, she feels that she is doing better.  She has not asked for any pain medications and is simply given scheduled ones.  She continues to have regular bowel movements multiple times per day.    She is walking now with some \"smaller pain.\"  She walked 10 steps today with a 2 wheeled walker.  She also uses the walker to transfer.  She earlier complained of some low back pain on the left, but that is improving.    Discharge planning: She tells me she is doing better, and when I talked to her about going home, she says, \"I think maybe not.\"  She lives alone in an apartment although she doesn't think she can live alone anymore.  There is a least mild cognitive impairment.  According to therapy, she needs 24-hour care.    ROS: No headaches or chest pains, coughing or congestion, nausea or vomiting, dizziness or dyspnea, dysuria, difficulty chewing or swallowing, integumentary issues, or problems with appetite.  The patient and nursing staff tell me that she is not sleeping.  She stated that it's normal for her.    Past Medical History:   Diagnosis Date     Anal cancer (H)      Endometriosis, site unspecified      History of malignant neoplasm of anus 7/26/2021     " Periprosthetic fracture of right hip, subsequent encounter 7/26/2021     Thyroiditis, unspecified     Thryoiditis-resolved              Family History   Problem Relation Age of Onset     Cancer Sister      Cancer Maternal Grandmother         lymphoma     Heart Disease Father         MI     Uterine Cancer Niece      Social History     Socioeconomic History     Marital status: Single     Spouse name: None     Number of children: None     Years of education: None     Highest education level: None   Occupational History     None   Tobacco Use     Smoking status: Never Smoker     Smokeless tobacco: Never Used   Substance and Sexual Activity     Alcohol use: No     Drug use: No     Sexual activity: Yes     Partners: Male   Other Topics Concern     Parent/sibling w/ CABG, MI or angioplasty before 65F 55M? Not Asked   Social History Narrative     None     Social Determinants of Health     Financial Resource Strain: Medium Risk     Difficulty of Paying Living Expenses: Somewhat hard   Food Insecurity: No Food Insecurity     Worried About Running Out of Food in the Last Year: Never true     Ran Out of Food in the Last Year: Never true   Transportation Needs: No Transportation Needs     Lack of Transportation (Medical): No     Lack of Transportation (Non-Medical): No   Physical Activity: Insufficiently Active     Days of Exercise per Week: 5 days     Minutes of Exercise per Session: 10 min   Stress: Stress Concern Present     Feeling of Stress : To some extent   Social Connections:      Frequency of Communication with Friends and Family:      Frequency of Social Gatherings with Friends and Family:      Attends [employer] Services:      Active Member of Clubs or Organizations:      Attends Club or Organization Meetings:      Marital Status:    Intimate Partner Violence:      Fear of Current or Ex-Partner:      Emotionally Abused:      Physically Abused:      Sexually Abused:        MEDICATIONS: Reviewed from the MAR, physician  orders, and/or earlier progress notes.  Post Discharge Medication Reconciliation Status: medication reconcilation previously completed during another office visit.    Current Outpatient Medications   Medication Sig     acetaminophen (TYLENOL) 500 MG tablet Take 1,000 mg by mouth 3 times daily     Calcium-Magnesium-Zinc 333-133-5 MG TABS per tablet Take 1 tablet by mouth daily     cholecalciferol (VITAMIN D3) 5000 units (125 mcg) capsule 1 CAP BY MOUTH DAILY (DX: OSTEOPOROSIS)     conjugated estrogens (PREMARIN) 0.625 MG/GM vaginal cream Apply locally to clitoral randall pea sized amount daily for 2 weeks then prn. (Patient not taking: Reported on 6/28/2021)     docusate sodium (COLACE) 100 MG capsule Take 1 capsule (100 mg) by mouth 2 times daily (Patient not taking: Reported on 6/28/2021)     estradiol (ESTRACE) 0.1 MG/GM vaginal cream Apply locally to clitoral randall pea sized amount daily for 2 weeks then prn. (Patient not taking: Reported on 6/28/2021)     gabapentin (NEURONTIN) 300 MG capsule Take 1 capsule (300 mg) by mouth 3 times daily     Lidocaine (LIDOCARE) 4 % Patch Place 2 patches onto the skin every 24 hours To prevent lidocaine toxicity, patient should be patch free for 12 hrs daily.     metroNIDAZOLE (METROGEL) 0.75 % external gel Apply topically 2 times daily     polyethylene glycol (MIRALAX) 17 GM/Dose powder Take 17 g by mouth daily     vitamin B complex with vitamin C (VITAMIN  B COMPLEX) TABS tablet Take 1 tablet by mouth daily     VITAMIN E PO Take 1 capsule by mouth daily     Current Facility-Administered Medications   Medication     heparin 100 UNIT/ML injection 500 Units     ALLERGIES:   Allergies   Allergen Reactions     Darvon [Propoxyphene]      Had reaction in teen years     Ketoconazole Rash     Penicillins Hives     As a child     DIET: Regular, regular texture, thin liquids.    Vitals:    08/02/21 1440   BP: 116/71   Pulse: 76   Resp: 18   Temp: 98.5  F (36.9  C)   SpO2: 96%   Weight: 53.3  kg (117 lb 6.4 oz)     Body mass index is 20.15 kg/m .    EXAMINATION:   General: Pleasant, very loquacious (and rather funny), frail-appearing elderly female, lying in bed, in no apparent distress.  Conversation is much more coherent today.  Head: Normocephalic and atraumatic.   Eyes: PERRLA, sclerae clear.  Eyes are a bit red rimmed.  ENT: Moist oral mucosa.  She has many of her own teeth.  No rhinorrhea or nasal discharge.  Hearing appears to be quite good.  Cardiovascular: Regular rate and rhythm with intermittent ectopy and no appreciable murmur.   Respiratory: Lungs clear to auscultation bilaterally.   Abdomen: Nondistended.   Musculoskeletal/Extremities: Age-related degenerative joint disease.  Bilateral mild hallux valgus deformities and hammertoes.  Currently, somewhat mild lower extremity edema.  Port-A-Cath in the right upper chest wall.  Integument: Hypertrophic lower extremity skin from venous stasis.  Otherwise, no rashes, clinically significant lesions, or skin breakdown.   Cognitive/Psychiatric: Clearly trying to have some cognitive deficits, though affect is quite upbeat.    DIAGNOSTICS:   Last Comprehensive Metabolic Panel:  Sodium   Date Value Ref Range Status   07/21/2021 142 133 - 144 mmol/L Final   06/14/2021 141 133 - 144 mmol/L Final     Potassium   Date Value Ref Range Status   07/21/2021 3.6 3.4 - 5.3 mmol/L Final   06/14/2021 3.7 3.4 - 5.3 mmol/L Final     Chloride   Date Value Ref Range Status   07/21/2021 111 (H) 94 - 109 mmol/L Final   06/14/2021 106 94 - 109 mmol/L Final     Carbon Dioxide   Date Value Ref Range Status   06/14/2021 31 20 - 32 mmol/L Final     Carbon Dioxide (CO2)   Date Value Ref Range Status   07/21/2021 27 20 - 32 mmol/L Final     Anion Gap   Date Value Ref Range Status   07/21/2021 4 3 - 14 mmol/L Final   06/14/2021 4 3 - 14 mmol/L Final     Glucose   Date Value Ref Range Status   07/21/2021 87 70 - 99 mg/dL Final   06/14/2021 66 (L) 70 - 99 mg/dL Final     Urea  Nitrogen   Date Value Ref Range Status   07/21/2021 20 7 - 30 mg/dL Final   06/14/2021 24 7 - 30 mg/dL Final     Creatinine   Date Value Ref Range Status   07/21/2021 0.58 0.52 - 1.04 mg/dL Final   06/14/2021 0.70 0.52 - 1.04 mg/dL Final     GFR Estimate   Date Value Ref Range Status   07/21/2021 86 >60 mL/min/1.73m2 Final     Comment:     As of July 11, 2021, eGFR is calculated by the CKD-EPI creatinine equation, without race adjustment. eGFR can be influenced by muscle mass, exercise, and diet. The reported eGFR is an estimation only and is only applicable if the renal function is stable.   06/14/2021 80 >60 mL/min/[1.73_m2] Final     Comment:     Non  GFR Calc  Starting 12/18/2018, serum creatinine based estimated GFR (eGFR) will be   calculated using the Chronic Kidney Disease Epidemiology Collaboration   (CKD-EPI) equation.       Calcium   Date Value Ref Range Status   07/21/2021 8.4 (L) 8.5 - 10.1 mg/dL Final   06/14/2021 9.5 8.5 - 10.1 mg/dL Final       Lab Results   Component Value Date    WBC 4.9 07/21/2021    WBC 5.0 06/14/2021     Lab Results   Component Value Date    RBC 3.53 07/21/2021    RBC 3.96 06/14/2021     Lab Results   Component Value Date    HGB 11.5 07/21/2021    HGB 12.8 06/14/2021     Lab Results   Component Value Date    HCT 36.5 07/21/2021    HCT 39.3 06/14/2021     Lab Results   Component Value Date     07/21/2021    MCV 99 06/14/2021     Lab Results   Component Value Date    MCH 32.6 07/21/2021    MCH 32.3 06/14/2021     Lab Results   Component Value Date    MCHC 31.5 07/21/2021    MCHC 32.6 06/14/2021     Lab Results   Component Value Date    RDW 13.7 07/21/2021    RDW 14.2 06/14/2021     Lab Results   Component Value Date     07/21/2021     06/14/2021       ASSESSMENT/Plan:     Diagnosis Comments   1. Periprosthetic fracture of right hip, subsequent encounter   pain, she says, is bearable   2. Fall, subsequent encounter     3. History of malignant  neoplasm of anus   in remission   4. Memory problem   fairly significant amount of confusion   5. Atrophic vaginitis   utilizes topical creams       CHANGES:    None.    CARE PLAN:    The care plan, medications, vital signs, orders, and nursing notes have been reviewed, and all orders signed. Changes to care plan, if any, as noted. Otherwise, continue current plan of care.    The above has been created using voice recognition software. Please be aware that this may unintentionally  produce inaccuracies and/or nonsensical sentences.      Electronically signed by: GRAHAM Trejo CNP        Sincerely,        GRAHAM Trejo CNP

## 2021-08-04 PROBLEM — G31.84 MILD COGNITIVE IMPAIRMENT: Status: ACTIVE | Noted: 2021-07-26

## 2021-08-05 ENCOUNTER — TRANSITIONAL CARE UNIT VISIT (OUTPATIENT)
Dept: GERIATRICS | Facility: CLINIC | Age: 81
End: 2021-08-05
Payer: MEDICARE

## 2021-08-05 VITALS
SYSTOLIC BLOOD PRESSURE: 123 MMHG | BODY MASS INDEX: 20.15 KG/M2 | WEIGHT: 117.4 LBS | RESPIRATION RATE: 16 BRPM | OXYGEN SATURATION: 97 % | DIASTOLIC BLOOD PRESSURE: 66 MMHG | HEART RATE: 60 BPM | TEMPERATURE: 98.3 F

## 2021-08-05 DIAGNOSIS — Z85.048 HISTORY OF MALIGNANT NEOPLASM OF ANUS: ICD-10-CM

## 2021-08-05 DIAGNOSIS — M97.8XXD PERIPROSTHETIC FRACTURE OF HIP, SUBSEQUENT ENCOUNTER: Primary | ICD-10-CM

## 2021-08-05 DIAGNOSIS — S72.001A CLOSED FRACTURE OF NECK OF RIGHT FEMUR, INITIAL ENCOUNTER (H): Primary | ICD-10-CM
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
## 2021-08-05 DIAGNOSIS — N95.2 ATROPHIC VAGINITIS: ICD-10-CM

## 2021-08-05 DIAGNOSIS — Z96.649 PERIPROSTHETIC FRACTURE OF HIP, SUBSEQUENT ENCOUNTER: Primary | ICD-10-CM

## 2021-08-05 DIAGNOSIS — G31.84 MILD COGNITIVE IMPAIRMENT: ICD-10-CM

## 2021-08-05 PROCEDURE — 99309 SBSQ NF CARE MODERATE MDM 30: CPT | Performed by: NURSE PRACTITIONER

## 2021-08-05 RX ORDER — OXYCODONE HYDROCHLORIDE 5 MG/1
2.5 TABLET ORAL EVERY 6 HOURS PRN
Qty: 20 TABLET | Refills: 0 | Status: SHIPPED | OUTPATIENT
Start: 2021-08-05 | End: 2021-10-04

## 2021-08-05 RX ORDER — OXYCODONE HYDROCHLORIDE 5 MG/1
2.5 TABLET ORAL EVERY 6 HOURS PRN
COMMUNITY
Start: 2021-08-05 | End: 2021-08-05

## 2021-08-05 NOTE — LETTER
"    2021        RE: Elizabeth Taylor  [address]  [address]  [place] [address]        [facility]    Name:   Elizabeth Taylor  :   1940  MRN:    [id number]     Facility:   [facility] (SNF) [[id number]]   Room:   Code Status: FULL CODE and POLST AVAILABLE -     DOS: 2021  Previous visit: 2021    PCP:  Jann Henderson    CHIEF COMPLAINT / REASON FOR VISIT:  Chief Complaint   Patient presents with     Clinic Care Coordination - Follow-up     Periprosthetic fracture of the right hip      [facility] from 2021 until 2021 (right periprosthetic hip fracture)  [facility] from 2021 until 08/10/2021 (approximate discharge date)      HPI: Elizabeth is a 81 year old female with a past medical history significant for 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  She had tried managing right hip pain as an outpatient, but it soon became unbearable.  X-rays were performed at the direction of her PCP, and they showed \"suspicion of fracture\" of the right hip.  In the ED, a CT showed a periprosthetic fracture.  Orthopedics evaluated patient and advised that she could be weightbearing as tolerated with assistant without the need for surgical intervention.  She was admitted to the ED observation unit and was not treated with scheduled acetaminophen, gabapentin, cyclobenzaprine, Lidoderm patch, and as needed oxycodone.  Her pain was well controlled with these measures.  However, she did have some confusion, and the patient was discussed with her sister, Sahil.  Sahil confirmed that the patient has baseline memory problems.  Patient and ultimately the TCU were advised to " "only use oxycodone for severe breakthrough pain, as there is concern of worsening her confusion.  Prior to discharge, the cyclobenzaprine was discontinued, and he was discharged to the TCU on the remaining medications.  She can be weightbearing as tolerated with a walker and should follow-up with Dr. Phan in 6 weeks for repeat x-rays.      CURRENT/RECENT TCU ISSUES    Disposition: She keeps praising therapy people.  According to PT, she has made so much more progress in the last 2 days.  She is now walking around the entire floor.  Pain is improved, and she is walking with a 2 wheeled walker.  She also uses the walker to transfer.  She earlier complained of some low back pain on the left, but that is improving. She has not asked for any pain medications and is simply given scheduled ones.  She does have some discomfort in the sacral area, mostly when up in the chair, for the last 3 or 4 days.  She continues to have regular bowel movements multiple times per day.    Cognition: At the initial visit, the patient admitted to some confusion, stating that she did not know where she was.  Speech tended to be rambling.  When asked the year, she answered correctly with a slight delay; however, when asked the month, she said, \"August, no, it's not August yet.  It's June.  My birthday is [birth year].  She apparently forgot that she had a birthday just 12 days before.  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  I requested being made for a cognitive assessment.    Discharge planning: She tells me she is doing better, and when I talked to her about going home, she says, \"I think maybe not.\"  She lives alone in an apartment although she doesn't think she can live alone anymore.  There is a least mild cognitive impairment.  According to therapy, she needs 24-hour care.    ROS: No headaches or chest pains, coughing or congestion, nausea or vomiting, dizziness or dyspnea, dysuria, difficulty " chewing or swallowing, integumentary issues, or problems with appetite.  The patient and nursing staff tell me that she is not sleeping.  She stated that it's normal for her.    Past Medical History:   Diagnosis Date     Anal cancer (H)      Endometriosis, site unspecified      History of malignant neoplasm of anus 7/26/2021     Periprosthetic fracture of right hip, subsequent encounter 7/26/2021     Thyroiditis, unspecified     Thryoiditis-resolved              Family History   Problem Relation Age of Onset     Cancer Sister      Cancer Maternal Grandmother         lymphoma     Heart Disease Father         MI     Uterine Cancer Niece      Social History     Socioeconomic History     Marital status: Single     Spouse name: None     Number of children: None     Years of education: None     Highest education level: None   Occupational History     None   Tobacco Use     Smoking status: Never Smoker     Smokeless tobacco: Never Used   Substance and Sexual Activity     Alcohol use: No     Drug use: No     Sexual activity: Yes     Partners: Male   Other Topics Concern     Parent/sibling w/ CABG, MI or angioplasty before 65F 55M? Not Asked   Social History Narrative     None     Social Determinants of Health     Financial Resource Strain: Medium Risk     Difficulty of Paying Living Expenses: Somewhat hard   Food Insecurity: No Food Insecurity     Worried About Running Out of Food in the Last Year: Never true     Ran Out of Food in the Last Year: Never true   Transportation Needs: No Transportation Needs     Lack of Transportation (Medical): No     Lack of Transportation (Non-Medical): No   Physical Activity: Insufficiently Active     Days of Exercise per Week: 5 days     Minutes of Exercise per Session: 10 min   Stress: Stress Concern Present     Feeling of Stress : To some extent   Social Connections:      Frequency of Communication with Friends and Family:      Frequency of Social Gatherings with Friends and Family:       Attends [employer] Services:      Active Member of Clubs or Organizations:      Attends Club or Organization Meetings:      Marital Status:    Intimate Partner Violence:      Fear of Current or Ex-Partner:      Emotionally Abused:      Physically Abused:      Sexually Abused:        MEDICATIONS: Reviewed from the MAR, physician orders, and/or earlier progress notes.  Post Discharge Medication Reconciliation Status: medication reconcilation previously completed during another office visit.    Current Outpatient Medications   Medication Sig     acetaminophen (TYLENOL) 500 MG tablet Take 1,000 mg by mouth 3 times daily     Calcium-Magnesium-Zinc 333-133-5 MG TABS per tablet Take 1 tablet by mouth daily     cholecalciferol (VITAMIN D3) 5000 units (125 mcg) capsule 1 CAP BY MOUTH DAILY (DX: OSTEOPOROSIS)     conjugated estrogens (PREMARIN) 0.625 MG/GM vaginal cream Apply locally to clitoral randall pea sized amount daily for 2 weeks then prn. (Patient not taking: Reported on 6/28/2021)     docusate sodium (COLACE) 100 MG capsule Take 1 capsule (100 mg) by mouth 2 times daily (Patient not taking: Reported on 6/28/2021)     estradiol (ESTRACE) 0.1 MG/GM vaginal cream Apply locally to clitoral randall pea sized amount daily for 2 weeks then prn. (Patient not taking: Reported on 6/28/2021)     gabapentin (NEURONTIN) 300 MG capsule Take 1 capsule (300 mg) by mouth 3 times daily     Lidocaine (LIDOCARE) 4 % Patch Place 2 patches onto the skin every 24 hours To prevent lidocaine toxicity, patient should be patch free for 12 hrs daily.     metroNIDAZOLE (METROGEL) 0.75 % external gel Apply topically 2 times daily     oxyCODONE (ROXICODONE) 5 MG tablet Take 0.5 tablets (2.5 mg) by mouth every 6 hours as needed for pain     polyethylene glycol (MIRALAX) 17 GM/Dose powder Take 17 g by mouth daily     vitamin B complex with vitamin C (VITAMIN  B COMPLEX) TABS tablet Take 1 tablet by mouth daily     VITAMIN E PO Take 1 capsule by mouth  daily     Current Facility-Administered Medications   Medication     heparin 100 UNIT/ML injection 500 Units     ALLERGIES:   Allergies   Allergen Reactions     Darvon [Propoxyphene]      Had reaction in teen years     Ketoconazole Rash     Penicillins Hives     As a child     DIET: Regular, regular texture, thin liquids.    Vitals:    08/05/21 1302   BP: 123/66   Pulse: 60   Resp: 16   Temp: 98.3  F (36.8  C)   SpO2: 97%   Weight: 53.3 kg (117 lb 6.4 oz)     Body mass index is 20.15 kg/m .    EXAMINATION:   General: Pleasant, very loquacious (and rather funny), frail-appearing elderly female, observed transferring from the toilet to the wheelchair after cleaning herself up.  Head: Normocephalic and atraumatic.   Eyes: PERRLA, sclerae clear.  Eyes are a bit red rimmed.  ENT: Moist oral mucosa.  She has many of her own teeth.  No rhinorrhea or nasal discharge.  Hearing appears to be quite good.  Cardiovascular: Regular rate and rhythm with intermittent ectopy and no appreciable murmur.   Respiratory: Lungs clear to auscultation bilaterally.   Abdomen: Nondistended.   Musculoskeletal/Extremities: Age-related degenerative joint disease.  Bilateral moderate to severe hallux valgus deformities and hammertoes.  Currently, somewhat mild lower extremity edema.  Port-A-Cath in the right upper chest wall.  Integument: Hypertrophic lower extremity skin from venous stasis.  Otherwise, no rashes, clinically significant lesions, or skin breakdown.   Cognitive/Psychiatric: Clearly trying to have some cognitive deficits, though affect is quite upbeat.    DIAGNOSTICS:   Last Comprehensive Metabolic Panel:  Sodium   Date Value Ref Range Status   07/21/2021 142 133 - 144 mmol/L Final   06/14/2021 141 133 - 144 mmol/L Final     Potassium   Date Value Ref Range Status   07/21/2021 3.6 3.4 - 5.3 mmol/L Final   06/14/2021 3.7 3.4 - 5.3 mmol/L Final     Chloride   Date Value Ref Range Status   07/21/2021 111 (H) 94 - 109 mmol/L Final    06/14/2021 106 94 - 109 mmol/L Final     Carbon Dioxide   Date Value Ref Range Status   06/14/2021 31 20 - 32 mmol/L Final     Carbon Dioxide (CO2)   Date Value Ref Range Status   07/21/2021 27 20 - 32 mmol/L Final     Anion Gap   Date Value Ref Range Status   07/21/2021 4 3 - 14 mmol/L Final   06/14/2021 4 3 - 14 mmol/L Final     Glucose   Date Value Ref Range Status   07/21/2021 87 70 - 99 mg/dL Final   06/14/2021 66 (L) 70 - 99 mg/dL Final     Urea Nitrogen   Date Value Ref Range Status   07/21/2021 20 7 - 30 mg/dL Final   06/14/2021 24 7 - 30 mg/dL Final     Creatinine   Date Value Ref Range Status   07/21/2021 0.58 0.52 - 1.04 mg/dL Final   06/14/2021 0.70 0.52 - 1.04 mg/dL Final     GFR Estimate   Date Value Ref Range Status   07/21/2021 86 >60 mL/min/1.73m2 Final     Comment:     As of July 11, 2021, eGFR is calculated by the CKD-EPI creatinine equation, without race adjustment. eGFR can be influenced by muscle mass, exercise, and diet. The reported eGFR is an estimation only and is only applicable if the renal function is stable.   06/14/2021 80 >60 mL/min/[1.73_m2] Final     Comment:     Non  GFR Calc  Starting 12/18/2018, serum creatinine based estimated GFR (eGFR) will be   calculated using the Chronic Kidney Disease Epidemiology Collaboration   (CKD-EPI) equation.       Calcium   Date Value Ref Range Status   07/21/2021 8.4 (L) 8.5 - 10.1 mg/dL Final   06/14/2021 9.5 8.5 - 10.1 mg/dL Final       Lab Results   Component Value Date    WBC 4.9 07/21/2021    WBC 5.0 06/14/2021     Lab Results   Component Value Date    RBC 3.53 07/21/2021    RBC 3.96 06/14/2021     Lab Results   Component Value Date    HGB 11.5 07/21/2021    HGB 12.8 06/14/2021     Lab Results   Component Value Date    HCT 36.5 07/21/2021    HCT 39.3 06/14/2021     Lab Results   Component Value Date     07/21/2021    MCV 99 06/14/2021     Lab Results   Component Value Date    MCH 32.6 07/21/2021    MCH 32.3 06/14/2021      Lab Results   Component Value Date    MCHC 31.5 07/21/2021    MCHC 32.6 06/14/2021     Lab Results   Component Value Date    RDW 13.7 07/21/2021    RDW 14.2 06/14/2021     Lab Results   Component Value Date     07/21/2021     06/14/2021       ASSESSMENT/Plan:      ICD-10-CM    1. Periprosthetic fracture of right hip, subsequent encounter  M97.8XXD     Z96.649    2. Fall, subsequent encounter  W19.XXXD    3. History of malignant neoplasm of anus  Z85.048    4. Mild cognitive impairment  G31.84    5. Atrophic vaginitis  N95.2        CHANGES:    None.    CARE PLAN:    The care plan, medications, vital signs, orders, and nursing notes have been reviewed, and all orders signed. Changes to care plan, if any, as noted. Otherwise, continue current plan of care.    The above has been created using voice recognition software. Please be aware that this may unintentionally  produce inaccuracies and/or nonsensical sentences.      Electronically signed by: GRAHAM Trejo CNP        Sincerely,        GRAHAM Trejo CNP

## 2021-08-05 NOTE — PROGRESS NOTES
"[facility]    Name:   Elizabeth Taylor  :   1940  MRN:    [id number]     Facility:   [facility] ([facility]) [[id number]]   Room:   Code Status: FULL CODE and POLST AVAILABLE -     DOS: 2021  Previous visit: 2021    PCP:  Jann Henderson    CHIEF COMPLAINT / REASON FOR VISIT:  Chief Complaint   Patient presents with     Clinic Care Coordination - Follow-up     Periprosthetic fracture of the right hip      [facility] from 2021 until 2021 (right periprosthetic hip fracture)      HPI: Elizabeth is a 81 year old female with a past medical history significant for 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  She had tried managing right hip pain as an outpatient, but it soon became unbearable.  X-rays were performed at the direction of her PCP, and they showed \"suspicion of fracture\" of the right hip.  In the ED, a CT showed a periprosthetic fracture.  Orthopedics evaluated patient and advised that she could be weightbearing as tolerated with assistant without the need for surgical intervention.  She was admitted to the ED observation unit and was not treated with scheduled acetaminophen, gabapentin, cyclobenzaprine, Lidoderm patch, and as needed oxycodone.  Her pain was well controlled with these measures.  However, she did have some confusion, and the patient was discussed with her sister, Sahil.  Sahil confirmed that the patient has baseline memory problems.  Patient and ultimately the TCU were advised to only use oxycodone for severe breakthrough pain, as there is concern of worsening her confusion.  Prior to discharge, the cyclobenzaprine was discontinued, and he was discharged to the TCU on " "the remaining medications.  She can be weightbearing as tolerated with a walker and should follow-up with Dr. Phan in 6 weeks for repeat x-rays.      CURRENT/RECENT TCU ISSUES    Disposition: At the initial visit, the patient admitted to some confusion, stating that she did not know where she was.  Speech tended to be rambling.  When asked the year, she answered correctly with a slight delay; however, when asked the month, she said, \"August, no, it's not August yet.  It's June.  My birthday is [birth year].  She apparently forgot that she had a birthday just 12 days before.    She earlier stated that her pain when walking to the bathroom was bearable.  TODAY, she feels that she is doing better.  She has not asked for any pain medications and is simply given scheduled ones.  She continues to have regular bowel movements multiple times per day.    She is walking now with some \"smaller pain.\"  She walked 10 steps today with a 2 wheeled walker.  She also uses the walker to transfer.  She earlier complained of some low back pain on the left, but that is improving.    Discharge planning: She tells me she is doing better, and when I talked to her about going home, she says, \"I think maybe not.\"  She lives alone in an apartment although she doesn't think she can live alone anymore.  There is a least mild cognitive impairment.  According to therapy, she needs 24-hour care.    ROS: No headaches or chest pains, coughing or congestion, nausea or vomiting, dizziness or dyspnea, dysuria, difficulty chewing or swallowing, integumentary issues, or problems with appetite.  The patient and nursing staff tell me that she is not sleeping.  She stated that it's normal for her.    Past Medical History:   Diagnosis Date     Anal cancer (H)      Endometriosis, site unspecified      History of malignant neoplasm of anus 7/26/2021     Periprosthetic fracture of right hip, subsequent encounter 7/26/2021     Thyroiditis, unspecified     " Thryoiditis-resolved              Family History   Problem Relation Age of Onset     Cancer Sister      Cancer Maternal Grandmother         lymphoma     Heart Disease Father         MI     Uterine Cancer Niece      Social History     Socioeconomic History     Marital status: Single     Spouse name: None     Number of children: None     Years of education: None     Highest education level: None   Occupational History     None   Tobacco Use     Smoking status: Never Smoker     Smokeless tobacco: Never Used   Substance and Sexual Activity     Alcohol use: No     Drug use: No     Sexual activity: Yes     Partners: Male   Other Topics Concern     Parent/sibling w/ CABG, MI or angioplasty before 65F 55M? Not Asked   Social History Narrative     None     Social Determinants of Health     Financial Resource Strain: Medium Risk     Difficulty of Paying Living Expenses: Somewhat hard   Food Insecurity: No Food Insecurity     Worried About Running Out of Food in the Last Year: Never true     Ran Out of Food in the Last Year: Never true   Transportation Needs: No Transportation Needs     Lack of Transportation (Medical): No     Lack of Transportation (Non-Medical): No   Physical Activity: Insufficiently Active     Days of Exercise per Week: 5 days     Minutes of Exercise per Session: 10 min   Stress: Stress Concern Present     Feeling of Stress : To some extent   Social Connections:      Frequency of Communication with Friends and Family:      Frequency of Social Gatherings with Friends and Family:      Attends [employer] Services:      Active Member of Clubs or Organizations:      Attends Club or Organization Meetings:      Marital Status:    Intimate Partner Violence:      Fear of Current or Ex-Partner:      Emotionally Abused:      Physically Abused:      Sexually Abused:        MEDICATIONS: Reviewed from the MAR, physician orders, and/or earlier progress notes.  Post Discharge Medication Reconciliation Status: medication  reconcilation previously completed during another office visit.    Current Outpatient Medications   Medication Sig     acetaminophen (TYLENOL) 500 MG tablet Take 1,000 mg by mouth 3 times daily     Calcium-Magnesium-Zinc 333-133-5 MG TABS per tablet Take 1 tablet by mouth daily     cholecalciferol (VITAMIN D3) 5000 units (125 mcg) capsule 1 CAP BY MOUTH DAILY (DX: OSTEOPOROSIS)     conjugated estrogens (PREMARIN) 0.625 MG/GM vaginal cream Apply locally to clitoral randall pea sized amount daily for 2 weeks then prn. (Patient not taking: Reported on 6/28/2021)     docusate sodium (COLACE) 100 MG capsule Take 1 capsule (100 mg) by mouth 2 times daily (Patient not taking: Reported on 6/28/2021)     estradiol (ESTRACE) 0.1 MG/GM vaginal cream Apply locally to clitoral randall pea sized amount daily for 2 weeks then prn. (Patient not taking: Reported on 6/28/2021)     gabapentin (NEURONTIN) 300 MG capsule Take 1 capsule (300 mg) by mouth 3 times daily     Lidocaine (LIDOCARE) 4 % Patch Place 2 patches onto the skin every 24 hours To prevent lidocaine toxicity, patient should be patch free for 12 hrs daily.     metroNIDAZOLE (METROGEL) 0.75 % external gel Apply topically 2 times daily     polyethylene glycol (MIRALAX) 17 GM/Dose powder Take 17 g by mouth daily     vitamin B complex with vitamin C (VITAMIN  B COMPLEX) TABS tablet Take 1 tablet by mouth daily     VITAMIN E PO Take 1 capsule by mouth daily     Current Facility-Administered Medications   Medication     heparin 100 UNIT/ML injection 500 Units     ALLERGIES:   Allergies   Allergen Reactions     Darvon [Propoxyphene]      Had reaction in teen years     Ketoconazole Rash     Penicillins Hives     As a child     DIET: Regular, regular texture, thin liquids.    Vitals:    08/02/21 1440   BP: 116/71   Pulse: 76   Resp: 18   Temp: 98.5  F (36.9  C)   SpO2: 96%   Weight: 53.3 kg (117 lb 6.4 oz)     Body mass index is 20.15 kg/m .    EXAMINATION:   General: Pleasant, very  loquacious (and rather funny), frail-appearing elderly female, lying in bed, in no apparent distress.  Conversation is much more coherent today.  Head: Normocephalic and atraumatic.   Eyes: PERRLA, sclerae clear.  Eyes are a bit red rimmed.  ENT: Moist oral mucosa.  She has many of her own teeth.  No rhinorrhea or nasal discharge.  Hearing appears to be quite good.  Cardiovascular: Regular rate and rhythm with intermittent ectopy and no appreciable murmur.   Respiratory: Lungs clear to auscultation bilaterally.   Abdomen: Nondistended.   Musculoskeletal/Extremities: Age-related degenerative joint disease.  Bilateral mild hallux valgus deformities and hammertoes.  Currently, somewhat mild lower extremity edema.  Port-A-Cath in the right upper chest wall.  Integument: Hypertrophic lower extremity skin from venous stasis.  Otherwise, no rashes, clinically significant lesions, or skin breakdown.   Cognitive/Psychiatric: Clearly trying to have some cognitive deficits, though affect is quite upbeat.    DIAGNOSTICS:   Last Comprehensive Metabolic Panel:  Sodium   Date Value Ref Range Status   07/21/2021 142 133 - 144 mmol/L Final   06/14/2021 141 133 - 144 mmol/L Final     Potassium   Date Value Ref Range Status   07/21/2021 3.6 3.4 - 5.3 mmol/L Final   06/14/2021 3.7 3.4 - 5.3 mmol/L Final     Chloride   Date Value Ref Range Status   07/21/2021 111 (H) 94 - 109 mmol/L Final   06/14/2021 106 94 - 109 mmol/L Final     Carbon Dioxide   Date Value Ref Range Status   06/14/2021 31 20 - 32 mmol/L Final     Carbon Dioxide (CO2)   Date Value Ref Range Status   07/21/2021 27 20 - 32 mmol/L Final     Anion Gap   Date Value Ref Range Status   07/21/2021 4 3 - 14 mmol/L Final   06/14/2021 4 3 - 14 mmol/L Final     Glucose   Date Value Ref Range Status   07/21/2021 87 70 - 99 mg/dL Final   06/14/2021 66 (L) 70 - 99 mg/dL Final     Urea Nitrogen   Date Value Ref Range Status   07/21/2021 20 7 - 30 mg/dL Final   06/14/2021 24 7 - 30  mg/dL Final     Creatinine   Date Value Ref Range Status   07/21/2021 0.58 0.52 - 1.04 mg/dL Final   06/14/2021 0.70 0.52 - 1.04 mg/dL Final     GFR Estimate   Date Value Ref Range Status   07/21/2021 86 >60 mL/min/1.73m2 Final     Comment:     As of July 11, 2021, eGFR is calculated by the CKD-EPI creatinine equation, without race adjustment. eGFR can be influenced by muscle mass, exercise, and diet. The reported eGFR is an estimation only and is only applicable if the renal function is stable.   06/14/2021 80 >60 mL/min/[1.73_m2] Final     Comment:     Non  GFR Calc  Starting 12/18/2018, serum creatinine based estimated GFR (eGFR) will be   calculated using the Chronic Kidney Disease Epidemiology Collaboration   (CKD-EPI) equation.       Calcium   Date Value Ref Range Status   07/21/2021 8.4 (L) 8.5 - 10.1 mg/dL Final   06/14/2021 9.5 8.5 - 10.1 mg/dL Final       Lab Results   Component Value Date    WBC 4.9 07/21/2021    WBC 5.0 06/14/2021     Lab Results   Component Value Date    RBC 3.53 07/21/2021    RBC 3.96 06/14/2021     Lab Results   Component Value Date    HGB 11.5 07/21/2021    HGB 12.8 06/14/2021     Lab Results   Component Value Date    HCT 36.5 07/21/2021    HCT 39.3 06/14/2021     Lab Results   Component Value Date     07/21/2021    MCV 99 06/14/2021     Lab Results   Component Value Date    MCH 32.6 07/21/2021    MCH 32.3 06/14/2021     Lab Results   Component Value Date    MCHC 31.5 07/21/2021    MCHC 32.6 06/14/2021     Lab Results   Component Value Date    RDW 13.7 07/21/2021    RDW 14.2 06/14/2021     Lab Results   Component Value Date     07/21/2021     06/14/2021       ASSESSMENT/Plan:     Diagnosis Comments   1. Periprosthetic fracture of right hip, subsequent encounter   pain, she says, is bearable   2. Fall, subsequent encounter     3. History of malignant neoplasm of anus   in remission   4. Memory problem   fairly significant amount of confusion   5.  Atrophic vaginitis   utilizes topical creams       CHANGES:    None.    CARE PLAN:    The care plan, medications, vital signs, orders, and nursing notes have been reviewed, and all orders signed. Changes to care plan, if any, as noted. Otherwise, continue current plan of care.    The above has been created using voice recognition software. Please be aware that this may unintentionally  produce inaccuracies and/or nonsensical sentences.      Electronically signed by: GRAHAM Trejo CNP

## 2021-08-06 ENCOUNTER — PATIENT OUTREACH (OUTPATIENT)
Dept: OTHER | Facility: CLINIC | Age: 81
End: 2021-08-06

## 2021-08-06 NOTE — PROGRESS NOTES
"[facility] Program  Community Health Worker Follow Up    Intervention and Education during outreach:     Called pt to check in. When she picked up the phone she said \"I just got back to my room from walking the halls. I also had speech therapy earlier today.\" Pt said she's had people \"visiting and calling me, and I also have received many cards in the mail.\" Pt said overall she is \"thrilled\" about where she is at and the support she's received at [facility].     Pt shared that she's been in touch with her two sisters, Belen and Sahil, and \"they said that I have to leave here by next week because of finances.\" I asked pt if she knows what the plan is, and she said \"Sahil is looking into more funding\" but pt did not know where it was through or where her sister was at in the process. I thanked her for mentioning this to me. Asked pt's permission to reach out to her sister to get more information. Pt agreed and said \"they would probably like that, they need all the help they can get to help me.\"     Pt said \"I'm thinking about long term care, and after the last three falls, my bones and brain have changed.\" She said that her sisters and friends think \"it's too soon for me to live by myself again, and I agree.\" She said Sahil and her  Hugo are considering having pt move into their home for a temporary time, like she did in the past after being discharged from a nursing home after her cancer treatment. Pt said \"the lease for my apartment is up September 1st, and I'm thinking about letting it lapse.\" I told pt that it sounds like they are considering all options, and pt agreed. She said \"everything is on the table right now.\" Pt shared that \"I don't want to be falling down and alone, pressing buttons forever and wondering if someone will come to help me.\" She said she is comforted by the fact that where she's at now, there is \"always someone nearby if I need them.\" I agreed and thanked her again " "for sharing this update with me.    I asked if pt had any questions or concerns, and she declined. I encouraged her to reach out to me with questions or updates, and she agreed. She said \"I'm not sure where I'll be by the end of next week, which is a strange feeling.\" I reminded pt that I will be following up with her sister Sahil and brother-in-law Hugo to see if they have come up with a plan. Pt said \"thank you, and will you please keep in touch?\" I reassured her that yes, I will, and I thanked her for speaking with me today.    CHW Plan:   1. CP CHW will reach out to pt's sister Sahil for an update on plan for pt's living situation/options.  2. Pt will call the CP CHW with questions, updates or for resources.      "

## 2021-08-08 NOTE — PROGRESS NOTES
"[facility]    Name:   Elizabeth Taylor  :   1940  MRN:    [id number]     Facility:   [facility] ([facility]) [[id number]]   Room:   Code Status: FULL CODE and POLST AVAILABLE -     DOS: 2021  Previous visit: 2021    PCP:  Jann Henderson    CHIEF COMPLAINT / REASON FOR VISIT:  Chief Complaint   Patient presents with     Clinic Care Coordination - Follow-up     Periprosthetic fracture of the right hip      [facility] from 2021 until 2021 (right periprosthetic hip fracture)  [facility] from 2021 until 08/10/2021 (approximate discharge date)      HPI: Elizabeth is a 81 year old female with a past medical history significant for 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  She had tried managing right hip pain as an outpatient, but it soon became unbearable.  X-rays were performed at the direction of her PCP, and they showed \"suspicion of fracture\" of the right hip.  In the ED, a CT showed a periprosthetic fracture.  Orthopedics evaluated patient and advised that she could be weightbearing as tolerated with assistant without the need for surgical intervention.  She was admitted to the ED observation unit and was not treated with scheduled acetaminophen, gabapentin, cyclobenzaprine, Lidoderm patch, and as needed oxycodone.  Her pain was well controlled with these measures.  However, she did have some confusion, and the patient was discussed with her sister, Sahil.  Sahil confirmed that the patient has baseline memory problems.  Patient and ultimately the TCU were advised to only use oxycodone for severe breakthrough pain, as there is concern of worsening her confusion.  Prior " "to discharge, the cyclobenzaprine was discontinued, and he was discharged to the TCU on the remaining medications.  She can be weightbearing as tolerated with a walker and should follow-up with Dr. Phan in 6 weeks for repeat x-rays.      CURRENT/RECENT TCU ISSUES    Disposition: She keeps praising therapy people.  According to PT, she has made so much more progress in the last 2 days.  She is now walking around the entire floor.  Pain is improved, and she is walking with a 2 wheeled walker.  She also uses the walker to transfer.  She earlier complained of some low back pain on the left, but that is improving. She has not asked for any pain medications and is simply given scheduled ones.  She does have some discomfort in the sacral area, mostly when up in the chair, for the last 3 or 4 days.  She continues to have regular bowel movements multiple times per day.    Cognition: At the initial visit, the patient admitted to some confusion, stating that she did not know where she was.  Speech tended to be rambling.  When asked the year, she answered correctly with a slight delay; however, when asked the month, she said, \"August, no, it's not August yet.  It's June.  My birthday is [birth year].  She apparently forgot that she had a birthday just 12 days before.  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  I requested being made for a cognitive assessment.    Discharge planning: She tells me she is doing better, and when I talked to her about going home, she says, \"I think maybe not.\"  She lives alone in an apartment although she doesn't think she can live alone anymore.  There is a least mild cognitive impairment.  According to therapy, she needs 24-hour care.    ROS: No headaches or chest pains, coughing or congestion, nausea or vomiting, dizziness or dyspnea, dysuria, difficulty chewing or swallowing, integumentary issues, or problems with appetite.  The patient and nursing staff tell " me that she is not sleeping.  She stated that it's normal for her.    Past Medical History:   Diagnosis Date     Anal cancer (H)      Endometriosis, site unspecified      History of malignant neoplasm of anus 7/26/2021     Periprosthetic fracture of right hip, subsequent encounter 7/26/2021     Thyroiditis, unspecified     Thryoiditis-resolved              Family History   Problem Relation Age of Onset     Cancer Sister      Cancer Maternal Grandmother         lymphoma     Heart Disease Father         MI     Uterine Cancer Niece      Social History     Socioeconomic History     Marital status: Single     Spouse name: None     Number of children: None     Years of education: None     Highest education level: None   Occupational History     None   Tobacco Use     Smoking status: Never Smoker     Smokeless tobacco: Never Used   Substance and Sexual Activity     Alcohol use: No     Drug use: No     Sexual activity: Yes     Partners: Male   Other Topics Concern     Parent/sibling w/ CABG, MI or angioplasty before 65F 55M? Not Asked   Social History Narrative     None     Social Determinants of Health     Financial Resource Strain: Medium Risk     Difficulty of Paying Living Expenses: Somewhat hard   Food Insecurity: No Food Insecurity     Worried About Running Out of Food in the Last Year: Never true     Ran Out of Food in the Last Year: Never true   Transportation Needs: No Transportation Needs     Lack of Transportation (Medical): No     Lack of Transportation (Non-Medical): No   Physical Activity: Insufficiently Active     Days of Exercise per Week: 5 days     Minutes of Exercise per Session: 10 min   Stress: Stress Concern Present     Feeling of Stress : To some extent   Social Connections:      Frequency of Communication with Friends and Family:      Frequency of Social Gatherings with Friends and Family:      Attends [employer] Services:      Active Member of Clubs or Organizations:      Attends Club or Organization  Meetings:      Marital Status:    Intimate Partner Violence:      Fear of Current or Ex-Partner:      Emotionally Abused:      Physically Abused:      Sexually Abused:        MEDICATIONS: Reviewed from the MAR, physician orders, and/or earlier progress notes.  Post Discharge Medication Reconciliation Status: medication reconcilation previously completed during another office visit.    Current Outpatient Medications   Medication Sig     acetaminophen (TYLENOL) 500 MG tablet Take 1,000 mg by mouth 3 times daily     Calcium-Magnesium-Zinc 333-133-5 MG TABS per tablet Take 1 tablet by mouth daily     cholecalciferol (VITAMIN D3) 5000 units (125 mcg) capsule 1 CAP BY MOUTH DAILY (DX: OSTEOPOROSIS)     conjugated estrogens (PREMARIN) 0.625 MG/GM vaginal cream Apply locally to clitoral randall pea sized amount daily for 2 weeks then prn. (Patient not taking: Reported on 6/28/2021)     docusate sodium (COLACE) 100 MG capsule Take 1 capsule (100 mg) by mouth 2 times daily (Patient not taking: Reported on 6/28/2021)     estradiol (ESTRACE) 0.1 MG/GM vaginal cream Apply locally to clitoral randall pea sized amount daily for 2 weeks then prn. (Patient not taking: Reported on 6/28/2021)     gabapentin (NEURONTIN) 300 MG capsule Take 1 capsule (300 mg) by mouth 3 times daily     Lidocaine (LIDOCARE) 4 % Patch Place 2 patches onto the skin every 24 hours To prevent lidocaine toxicity, patient should be patch free for 12 hrs daily.     metroNIDAZOLE (METROGEL) 0.75 % external gel Apply topically 2 times daily     oxyCODONE (ROXICODONE) 5 MG tablet Take 0.5 tablets (2.5 mg) by mouth every 6 hours as needed for pain     polyethylene glycol (MIRALAX) 17 GM/Dose powder Take 17 g by mouth daily     vitamin B complex with vitamin C (VITAMIN  B COMPLEX) TABS tablet Take 1 tablet by mouth daily     VITAMIN E PO Take 1 capsule by mouth daily     Current Facility-Administered Medications   Medication     heparin 100 UNIT/ML injection 500 Units      ALLERGIES:   Allergies   Allergen Reactions     Darvon [Propoxyphene]      Had reaction in teen years     Ketoconazole Rash     Penicillins Hives     As a child     DIET: Regular, regular texture, thin liquids.    Vitals:    08/05/21 1302   BP: 123/66   Pulse: 60   Resp: 16   Temp: 98.3  F (36.8  C)   SpO2: 97%   Weight: 53.3 kg (117 lb 6.4 oz)     Body mass index is 20.15 kg/m .    EXAMINATION:   General: Pleasant, very loquacious (and rather funny), frail-appearing elderly female, observed transferring from the toilet to the wheelchair after cleaning herself up.  Head: Normocephalic and atraumatic.   Eyes: PERRLA, sclerae clear.  Eyes are a bit red rimmed.  ENT: Moist oral mucosa.  She has many of her own teeth.  No rhinorrhea or nasal discharge.  Hearing appears to be quite good.  Cardiovascular: Regular rate and rhythm with intermittent ectopy and no appreciable murmur.   Respiratory: Lungs clear to auscultation bilaterally.   Abdomen: Nondistended.   Musculoskeletal/Extremities: Age-related degenerative joint disease.  Bilateral moderate to severe hallux valgus deformities and hammertoes.  Currently, somewhat mild lower extremity edema.  Port-A-Cath in the right upper chest wall.  Integument: Hypertrophic lower extremity skin from venous stasis.  Otherwise, no rashes, clinically significant lesions, or skin breakdown.   Cognitive/Psychiatric: Clearly trying to have some cognitive deficits, though affect is quite upbeat.    DIAGNOSTICS:   Last Comprehensive Metabolic Panel:  Sodium   Date Value Ref Range Status   07/21/2021 142 133 - 144 mmol/L Final   06/14/2021 141 133 - 144 mmol/L Final     Potassium   Date Value Ref Range Status   07/21/2021 3.6 3.4 - 5.3 mmol/L Final   06/14/2021 3.7 3.4 - 5.3 mmol/L Final     Chloride   Date Value Ref Range Status   07/21/2021 111 (H) 94 - 109 mmol/L Final   06/14/2021 106 94 - 109 mmol/L Final     Carbon Dioxide   Date Value Ref Range Status   06/14/2021 31 20 - 32  mmol/L Final     Carbon Dioxide (CO2)   Date Value Ref Range Status   07/21/2021 27 20 - 32 mmol/L Final     Anion Gap   Date Value Ref Range Status   07/21/2021 4 3 - 14 mmol/L Final   06/14/2021 4 3 - 14 mmol/L Final     Glucose   Date Value Ref Range Status   07/21/2021 87 70 - 99 mg/dL Final   06/14/2021 66 (L) 70 - 99 mg/dL Final     Urea Nitrogen   Date Value Ref Range Status   07/21/2021 20 7 - 30 mg/dL Final   06/14/2021 24 7 - 30 mg/dL Final     Creatinine   Date Value Ref Range Status   07/21/2021 0.58 0.52 - 1.04 mg/dL Final   06/14/2021 0.70 0.52 - 1.04 mg/dL Final     GFR Estimate   Date Value Ref Range Status   07/21/2021 86 >60 mL/min/1.73m2 Final     Comment:     As of July 11, 2021, eGFR is calculated by the CKD-EPI creatinine equation, without race adjustment. eGFR can be influenced by muscle mass, exercise, and diet. The reported eGFR is an estimation only and is only applicable if the renal function is stable.   06/14/2021 80 >60 mL/min/[1.73_m2] Final     Comment:     Non  GFR Calc  Starting 12/18/2018, serum creatinine based estimated GFR (eGFR) will be   calculated using the Chronic Kidney Disease Epidemiology Collaboration   (CKD-EPI) equation.       Calcium   Date Value Ref Range Status   07/21/2021 8.4 (L) 8.5 - 10.1 mg/dL Final   06/14/2021 9.5 8.5 - 10.1 mg/dL Final       Lab Results   Component Value Date    WBC 4.9 07/21/2021    WBC 5.0 06/14/2021     Lab Results   Component Value Date    RBC 3.53 07/21/2021    RBC 3.96 06/14/2021     Lab Results   Component Value Date    HGB 11.5 07/21/2021    HGB 12.8 06/14/2021     Lab Results   Component Value Date    HCT 36.5 07/21/2021    HCT 39.3 06/14/2021     Lab Results   Component Value Date     07/21/2021    MCV 99 06/14/2021     Lab Results   Component Value Date    MCH 32.6 07/21/2021    MCH 32.3 06/14/2021     Lab Results   Component Value Date    [facility] 31.5 07/21/2021    [facility] 32.6 06/14/2021     Lab Results    Component Value Date    RDW 13.7 07/21/2021    RDW 14.2 06/14/2021     Lab Results   Component Value Date     07/21/2021     06/14/2021       ASSESSMENT/Plan:      ICD-10-CM    1. Periprosthetic fracture of right hip, subsequent encounter  M97.8XXD     Z96.649    2. Fall, subsequent encounter  W19.XXXD    3. History of malignant neoplasm of anus  Z85.048    4. Mild cognitive impairment  G31.84    5. Atrophic vaginitis  N95.2        CHANGES:    Okay for OT/SLP to perform cognitive assessments.    CARE PLAN:    The care plan, medications, vital signs, orders, and nursing notes have been reviewed, and all orders signed. Changes to care plan, if any, as noted. Otherwise, continue current plan of care.    The above has been created using voice recognition software. Please be aware that this may unintentionally  produce inaccuracies and/or nonsensical sentences.      Electronically signed by: GRAHAM Trejo CNP

## 2021-08-09 ENCOUNTER — TRANSITIONAL CARE UNIT VISIT (OUTPATIENT)
Dept: GERIATRICS | Facility: CLINIC | Age: 81
End: 2021-08-09
Payer: MEDICARE

## 2021-08-09 VITALS
SYSTOLIC BLOOD PRESSURE: 130 MMHG | WEIGHT: 111.2 LBS | BODY MASS INDEX: 19.09 KG/M2 | DIASTOLIC BLOOD PRESSURE: 75 MMHG | TEMPERATURE: 98 F | OXYGEN SATURATION: 96 % | RESPIRATION RATE: 18 BRPM | HEART RATE: 68 BPM

## 2021-08-09 DIAGNOSIS — M97.8XXD PERIPROSTHETIC FRACTURE OF HIP, SUBSEQUENT ENCOUNTER: Primary | ICD-10-CM

## 2021-08-09 DIAGNOSIS — G31.84 MILD COGNITIVE IMPAIRMENT: ICD-10-CM

## 2021-08-09 DIAGNOSIS — Z96.649 PERIPROSTHETIC FRACTURE OF HIP, SUBSEQUENT ENCOUNTER: Primary | ICD-10-CM
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
## 2021-08-09 DIAGNOSIS — Z85.048 HISTORY OF MALIGNANT NEOPLASM OF ANUS: ICD-10-CM

## 2021-08-09 DIAGNOSIS — N95.2 ATROPHIC VAGINITIS: ICD-10-CM

## 2021-08-09 DIAGNOSIS — G47.00 INSOMNIA, UNSPECIFIED TYPE: ICD-10-CM

## 2021-08-09 PROCEDURE — 99309 SBSQ NF CARE MODERATE MDM 30: CPT | Performed by: NURSE PRACTITIONER

## 2021-08-09 NOTE — LETTER
"    2021        RE: Elizabeth Taylor  [address]  [address]  [place] [address]        [facility]    Name:   Elizabeth Taylor  :   1940  MRN:    [id number]     Facility:   [facility] (SNF) [[id number]]   Room:   Code Status: FULL CODE and POLST AVAILABLE -     DOS: 2021  Previous visit: 2021    PCP:  Jann Henderson    CHIEF COMPLAINT / REASON FOR VISIT:  Chief Complaint   Patient presents with     Clinic Care Coordination - Follow-up     Periprosthetic fracture of the right hip      [facility] from 2021 until 2021 (right periprosthetic hip fracture)  [facility] TCU from 2021 until 08/10/2021 (approximate discharge date)      HPI: Elizabeth is a 81 year old female with a past medical history significant for 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  She had tried managing right hip pain as an outpatient, but it soon became unbearable.  X-rays were performed at the direction of her PCP, and they showed \"suspicion of fracture\" of the right hip.  In the ED, a CT showed a periprosthetic fracture.  Orthopedics evaluated patient and advised that she could be weightbearing as tolerated with assistant without the need for surgical intervention.  She was admitted to the ED observation unit and was not treated with scheduled acetaminophen, gabapentin, cyclobenzaprine, Lidoderm patch, and as needed oxycodone.  Her pain was well controlled with these measures.  However, she did have some confusion, and the patient was discussed with her sister, Sahil.  Sahil confirmed that the patient has baseline memory problems.  Patient and ultimately the TCU were advised to " "only use oxycodone for severe breakthrough pain, as there is concern of worsening her confusion.  Prior to discharge, the cyclobenzaprine was discontinued, and he was discharged to the TCU on the remaining medications.  She can be weightbearing as tolerated with a walker and should follow-up with Dr. Phan in 6 weeks for repeat x-rays.      CURRENT/RECENT TCU ISSUES    Disposition: She keeps praising therapy people.  According to PT, she has made so much more progress in the last 2 days.  She is now walking around the entire floor.  Pain is improved, and she is walking with a 2 wheeled walker.  She also uses the walker to transfer.  She earlier complained of some low back pain on the left, but that is improving. She has not asked for any pain medications and is simply given scheduled ones.  She does have some discomfort in the sacral area, mostly when up in the chair, for the last 3 or 4 days.  She continues to have regular bowel movements multiple times per day.    Cognition: She does have a bit confused this morning.  At the initial visit, the patient admitted to some confusion, stating that she did not know where she was.  Speech tended to be rambling.  When asked the year, she answered correctly with a slight delay; however, when asked the month, she said, \"August, no, it's not August yet.  It's June.  My birthday is [birth year].  She apparently forgot that she had a birthday just 12 days before.  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  I requested OT or SLP to perform a cognitive assessment.    Insomnia: She has been unable to sleep.  We recently added 25 mg of trazodone at bedtime.  This is still insufficient, and we are going to increase the dose to 50 mg.    Discharge planning: She tells me she is doing better, and when I talked to her about going home, she says, \"I think maybe not.\"  She lives alone in an apartment although she doesn't think she can live alone anymore.  " There is a least mild cognitive impairment.  According to therapy, she needs 24-hour care.    ROS: No headaches or chest pains, coughing or congestion, nausea or vomiting, dizziness or dyspnea, dysuria, difficulty chewing or swallowing, integumentary issues, or problems with appetite.  The patient and nursing staff tell me that she is not sleeping.  She stated that it's normal for her.    Past Medical History:   Diagnosis Date     Anal cancer (H)      Endometriosis, site unspecified      History of malignant neoplasm of anus 7/26/2021     Periprosthetic fracture of right hip, subsequent encounter 7/26/2021     Thyroiditis, unspecified     Thryoiditis-resolved              Family History   Problem Relation Age of Onset     Cancer Sister      Cancer Maternal Grandmother         lymphoma     Heart Disease Father         MI     Uterine Cancer Niece      Social History     Socioeconomic History     Marital status: Single     Spouse name: None     Number of children: None     Years of education: None     Highest education level: None   Occupational History     None   Tobacco Use     Smoking status: Never Smoker     Smokeless tobacco: Never Used   Substance and Sexual Activity     Alcohol use: No     Drug use: No     Sexual activity: Yes     Partners: Male   Other Topics Concern     Parent/sibling w/ CABG, MI or angioplasty before 65F 55M? Not Asked   Social History Narrative     None     Social Determinants of Health     Financial Resource Strain: Medium Risk     Difficulty of Paying Living Expenses: Somewhat hard   Food Insecurity: No Food Insecurity     Worried About Running Out of Food in the Last Year: Never true     Ran Out of Food in the Last Year: Never true   Transportation Needs: No Transportation Needs     Lack of Transportation (Medical): No     Lack of Transportation (Non-Medical): No   Physical Activity: Insufficiently Active     Days of Exercise per Week: 5 days     Minutes of Exercise per Session: 10 min    Stress: Stress Concern Present     Feeling of Stress : To some extent   Social Connections:      Frequency of Communication with Friends and Family:      Frequency of Social Gatherings with Friends and Family:      Attends [employer] Services:      Active Member of Clubs or Organizations:      Attends Club or Organization Meetings:      Marital Status:    Intimate Partner Violence:      Fear of Current or Ex-Partner:      Emotionally Abused:      Physically Abused:      Sexually Abused:        MEDICATIONS: Reviewed from the MAR, physician orders, and/or earlier progress notes.  Post Discharge Medication Reconciliation Status: medication reconcilation previously completed during another office visit.    Current Outpatient Medications   Medication Sig     traZODone (DESYREL) 50 MG tablet Take 50 mg by mouth At Bedtime     acetaminophen (TYLENOL) 500 MG tablet Take 1,000 mg by mouth 3 times daily     Calcium-Magnesium-Zinc 333-133-5 MG TABS per tablet Take 1 tablet by mouth daily     cholecalciferol (VITAMIN D3) 5000 units (125 mcg) capsule 1 CAP BY MOUTH DAILY (DX: OSTEOPOROSIS)     conjugated estrogens (PREMARIN) 0.625 MG/GM vaginal cream Apply locally to clitoral randall pea sized amount daily for 2 weeks then prn. (Patient not taking: Reported on 6/28/2021)     docusate sodium (COLACE) 100 MG capsule Take 1 capsule (100 mg) by mouth 2 times daily (Patient not taking: Reported on 6/28/2021)     estradiol (ESTRACE) 0.1 MG/GM vaginal cream Apply locally to clitoral randall pea sized amount daily for 2 weeks then prn. (Patient not taking: Reported on 6/28/2021)     gabapentin (NEURONTIN) 300 MG capsule Take 1 capsule (300 mg) by mouth 3 times daily     Lidocaine (LIDOCARE) 4 % Patch Place 2 patches onto the skin every 24 hours To prevent lidocaine toxicity, patient should be patch free for 12 hrs daily.     metroNIDAZOLE (METROGEL) 0.75 % external gel Apply topically 2 times daily     oxyCODONE (ROXICODONE) 5 MG tablet Take  0.5 tablets (2.5 mg) by mouth every 6 hours as needed for pain     polyethylene glycol (MIRALAX) 17 GM/Dose powder Take 17 g by mouth daily     vitamin B complex with vitamin C (VITAMIN  B COMPLEX) TABS tablet Take 1 tablet by mouth daily     VITAMIN E PO Take 1 capsule by mouth daily     Current Facility-Administered Medications   Medication     heparin 100 UNIT/ML injection 500 Units     ALLERGIES:   Allergies   Allergen Reactions     Darvon [Propoxyphene]      Had reaction in teen years     Ketoconazole Rash     Penicillins Hives     As a child     DIET: Regular, regular texture, thin liquids.    Vitals:    08/09/21 1548   BP: 130/75   Pulse: 68   Resp: 18   Temp: 98  F (36.7  C)   SpO2: 96%   Weight: 50.4 kg (111 lb 3.2 oz)     Body mass index is 19.09 kg/m .    EXAMINATION:   General: Pleasant, very loquacious (and rather funny), frail-appearing elderly female, in no apparent distress.  She does appear a bit more confused this morning, going on and on and is jumping from 1 subject to another, sometimes incomprehensible.  Head: Normocephalic and atraumatic.   Eyes: PERRLA, sclerae clear.  Eyes are a bit red rimmed.  ENT: Moist oral mucosa.  She has many of her own teeth.  No rhinorrhea or nasal discharge.  Hearing appears to be quite good.  Cardiovascular: Regular rate and rhythm with intermittent ectopy and no appreciable murmur.   Respiratory: Lungs clear to auscultation bilaterally.   Abdomen: Nondistended.   Musculoskeletal/Extremities: Age-related degenerative joint disease.  Bilateral moderate to severe hallux valgus deformities and hammertoes.  Currently, somewhat mild lower extremity edema.  Port-A-Cath in the right upper chest wall.  Integument: Hypertrophic lower extremity skin from venous stasis.  Otherwise, no rashes, clinically significant lesions, or skin breakdown.   Cognitive/Psychiatric: Clearly trying to have some cognitive deficits, though affect is quite upbeat.    DIAGNOSTICS:   Last  Comprehensive Metabolic Panel:  Sodium   Date Value Ref Range Status   07/21/2021 142 133 - 144 mmol/L Final   06/14/2021 141 133 - 144 mmol/L Final     Potassium   Date Value Ref Range Status   07/21/2021 3.6 3.4 - 5.3 mmol/L Final   06/14/2021 3.7 3.4 - 5.3 mmol/L Final     Chloride   Date Value Ref Range Status   07/21/2021 111 (H) 94 - 109 mmol/L Final   06/14/2021 106 94 - 109 mmol/L Final     Carbon Dioxide   Date Value Ref Range Status   06/14/2021 31 20 - 32 mmol/L Final     Carbon Dioxide (CO2)   Date Value Ref Range Status   07/21/2021 27 20 - 32 mmol/L Final     Anion Gap   Date Value Ref Range Status   07/21/2021 4 3 - 14 mmol/L Final   06/14/2021 4 3 - 14 mmol/L Final     Glucose   Date Value Ref Range Status   07/21/2021 87 70 - 99 mg/dL Final   06/14/2021 66 (L) 70 - 99 mg/dL Final     Urea Nitrogen   Date Value Ref Range Status   07/21/2021 20 7 - 30 mg/dL Final   06/14/2021 24 7 - 30 mg/dL Final     Creatinine   Date Value Ref Range Status   07/21/2021 0.58 0.52 - 1.04 mg/dL Final   06/14/2021 0.70 0.52 - 1.04 mg/dL Final     GFR Estimate   Date Value Ref Range Status   07/21/2021 86 >60 mL/min/1.73m2 Final     Comment:     As of July 11, 2021, eGFR is calculated by the CKD-EPI creatinine equation, without race adjustment. eGFR can be influenced by muscle mass, exercise, and diet. The reported eGFR is an estimation only and is only applicable if the renal function is stable.   06/14/2021 80 >60 mL/min/[1.73_m2] Final     Comment:     Non  GFR Calc  Starting 12/18/2018, serum creatinine based estimated GFR (eGFR) will be   calculated using the Chronic Kidney Disease Epidemiology Collaboration   (CKD-EPI) equation.       Calcium   Date Value Ref Range Status   07/21/2021 8.4 (L) 8.5 - 10.1 mg/dL Final   06/14/2021 9.5 8.5 - 10.1 mg/dL Final       Lab Results   Component Value Date    WBC 4.9 07/21/2021    WBC 5.0 06/14/2021     Lab Results   Component Value Date    RBC 3.53 07/21/2021     RBC 3.96 06/14/2021     Lab Results   Component Value Date    HGB 11.5 07/21/2021    HGB 12.8 06/14/2021     Lab Results   Component Value Date    HCT 36.5 07/21/2021    HCT 39.3 06/14/2021     Lab Results   Component Value Date     07/21/2021    MCV 99 06/14/2021     Lab Results   Component Value Date    MCH 32.6 07/21/2021    MCH 32.3 06/14/2021     Lab Results   Component Value Date    MCHC 31.5 07/21/2021    MCHC 32.6 06/14/2021     Lab Results   Component Value Date    RDW 13.7 07/21/2021    RDW 14.2 06/14/2021     Lab Results   Component Value Date     07/21/2021     06/14/2021       ASSESSMENT/Plan:      ICD-10-CM    1. Periprosthetic fracture of right hip, subsequent encounter  M97.8XXD     Z96.649    2. Fall, subsequent encounter  W19.XXXD    3. History of malignant neoplasm of anus  Z85.048    4. Mild cognitive impairment  G31.84    5. Atrophic vaginitis  N95.2    6. Insomnia, unspecified type  G47.00        CHANGES:    Increase trazodone from 25 mg to 50 mg at bedtime.    CARE PLAN:    The care plan, medications, vital signs, orders, and nursing notes have been reviewed, and all orders signed. Changes to care plan, if any, as noted. Otherwise, continue current plan of care.    The above has been created using voice recognition software. Please be aware that this may unintentionally  produce inaccuracies and/or nonsensical sentences.      Electronically signed by: GRAHAM Trejo CNP        Sincerely,        GRAHAM Trejo CNP

## 2021-08-11 PROBLEM — G47.00 INSOMNIA: Status: ACTIVE | Noted: 2021-08-11

## 2021-08-11 RX ORDER — TRAZODONE HYDROCHLORIDE 50 MG/1
50 TABLET, FILM COATED ORAL AT BEDTIME
COMMUNITY
Start: 2021-08-09 | End: 2021-08-18

## 2021-08-12 NOTE — PROGRESS NOTES
"[facility]    Name:   Elizabeth Taylor  :   1940  MRN:    [id number]     Facility:   [facility] (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  She had tried managing right hip pain as an outpatient, but it soon became unbearable.  X-rays were performed at the direction of her PCP, and they showed \"suspicion of fracture\" of the right hip.  In the ED, a CT showed a periprosthetic fracture.  Orthopedics evaluated patient and advised that she could be weightbearing as tolerated with assistant without the need for surgical intervention.  She was admitted to the ED observation unit and was not treated with scheduled acetaminophen, gabapentin, cyclobenzaprine, Lidoderm patch, and as needed oxycodone.  Her pain was well controlled with these measures.  However, she did have some confusion, and the patient was discussed with her sister, Sahil.  Sahil confirmed that the patient has baseline memory problems.  Patient and ultimately the TCU were advised to only use oxycodone for severe breakthrough pain, as there is concern of worsening her confusion.  Prior " "to discharge, the cyclobenzaprine was discontinued, and he was discharged to the TCU on the remaining medications.  She can be weightbearing as tolerated with a walker and should follow-up with Dr. Phan in 6 weeks for repeat x-rays.      CURRENT/RECENT TCU ISSUES    Disposition: She keeps praising therapy people.  According to PT, she has made so much more progress in the last 2 days.  She is now walking around the entire floor.  Pain is improved, and she is walking with a 2 wheeled walker.  She also uses the walker to transfer.  She earlier complained of some low back pain on the left, but that is improving. She has not asked for any pain medications and is simply given scheduled ones.  She does have some discomfort in the sacral area, mostly when up in the chair, for the last 3 or 4 days.  She continues to have regular bowel movements multiple times per day.    Cognition: She does have a bit confused this morning.  At the initial visit, the patient admitted to some confusion, stating that she did not know where she was.  Speech tended to be rambling.  When asked the year, she answered correctly with a slight delay; however, when asked the month, she said, \"August, no, it's not August yet.  It's June.  My birthday is [birth year].  She apparently forgot that she had a birthday just 12 days before.  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  I requested OT or SLP to perform a cognitive assessment.    Insomnia: She has been unable to sleep.  We recently added 25 mg of trazodone at bedtime.  This is still insufficient, and we are going to increase the dose to 50 mg.    Discharge planning: She tells me she is doing better, and when I talked to her about going home, she says, \"I think maybe not.\"  She lives alone in an apartment although she doesn't think she can live alone anymore.  There is a least mild cognitive impairment.  According to therapy, she needs 24-hour care.    ROS: No " headaches or chest pains, coughing or congestion, nausea or vomiting, dizziness or dyspnea, dysuria, difficulty chewing or swallowing, integumentary issues, or problems with appetite.  The patient and nursing staff tell me that she is not sleeping.  She stated that it's normal for her.    Past Medical History:   Diagnosis Date     Anal cancer (H)      Endometriosis, site unspecified      History of malignant neoplasm of anus 7/26/2021     Periprosthetic fracture of right hip, subsequent encounter 7/26/2021     Thyroiditis, unspecified     Thryoiditis-resolved              Family History   Problem Relation Age of Onset     Cancer Sister      Cancer Maternal Grandmother         lymphoma     Heart Disease Father         MI     Uterine Cancer Niece      Social History     Socioeconomic History     Marital status: Single     Spouse name: None     Number of children: None     Years of education: None     Highest education level: None   Occupational History     None   Tobacco Use     Smoking status: Never Smoker     Smokeless tobacco: Never Used   Substance and Sexual Activity     Alcohol use: No     Drug use: No     Sexual activity: Yes     Partners: Male   Other Topics Concern     Parent/sibling w/ CABG, MI or angioplasty before 65F 55M? Not Asked   Social History Narrative     None     Social Determinants of Health     Financial Resource Strain: Medium Risk     Difficulty of Paying Living Expenses: Somewhat hard   Food Insecurity: No Food Insecurity     Worried About Running Out of Food in the Last Year: Never true     Ran Out of Food in the Last Year: Never true   Transportation Needs: No Transportation Needs     Lack of Transportation (Medical): No     Lack of Transportation (Non-Medical): No   Physical Activity: Insufficiently Active     Days of Exercise per Week: 5 days     Minutes of Exercise per Session: 10 min   Stress: Stress Concern Present     Feeling of Stress : To some extent   Social Connections:       Frequency of Communication with Friends and Family:      Frequency of Social Gatherings with Friends and Family:      Attends [employer] Services:      Active Member of Clubs or Organizations:      Attends Club or Organization Meetings:      Marital Status:    Intimate Partner Violence:      Fear of Current or Ex-Partner:      Emotionally Abused:      Physically Abused:      Sexually Abused:        MEDICATIONS: Reviewed from the MAR, physician orders, and/or earlier progress notes.  Post Discharge Medication Reconciliation Status: medication reconcilation previously completed during another office visit.    Current Outpatient Medications   Medication Sig     traZODone (DESYREL) 50 MG tablet Take 50 mg by mouth At Bedtime     acetaminophen (TYLENOL) 500 MG tablet Take 1,000 mg by mouth 3 times daily     Calcium-Magnesium-Zinc 333-133-5 MG TABS per tablet Take 1 tablet by mouth daily     cholecalciferol (VITAMIN D3) 5000 units (125 mcg) capsule 1 CAP BY MOUTH DAILY (DX: OSTEOPOROSIS)     conjugated estrogens (PREMARIN) 0.625 MG/GM vaginal cream Apply locally to clitoral randall pea sized amount daily for 2 weeks then prn. (Patient not taking: Reported on 6/28/2021)     docusate sodium (COLACE) 100 MG capsule Take 1 capsule (100 mg) by mouth 2 times daily (Patient not taking: Reported on 6/28/2021)     estradiol (ESTRACE) 0.1 MG/GM vaginal cream Apply locally to clitoral randall pea sized amount daily for 2 weeks then prn. (Patient not taking: Reported on 6/28/2021)     gabapentin (NEURONTIN) 300 MG capsule Take 1 capsule (300 mg) by mouth 3 times daily     Lidocaine (LIDOCARE) 4 % Patch Place 2 patches onto the skin every 24 hours To prevent lidocaine toxicity, patient should be patch free for 12 hrs daily.     metroNIDAZOLE (METROGEL) 0.75 % external gel Apply topically 2 times daily     oxyCODONE (ROXICODONE) 5 MG tablet Take 0.5 tablets (2.5 mg) by mouth every 6 hours as needed for pain     polyethylene glycol (MIRALAX)  17 GM/Dose powder Take 17 g by mouth daily     vitamin B complex with vitamin C (VITAMIN  B COMPLEX) TABS tablet Take 1 tablet by mouth daily     VITAMIN E PO Take 1 capsule by mouth daily     Current Facility-Administered Medications   Medication     heparin 100 UNIT/ML injection 500 Units     ALLERGIES:   Allergies   Allergen Reactions     Darvon [Propoxyphene]      Had reaction in teen years     Ketoconazole Rash     Penicillins Hives     As a child     DIET: Regular, regular texture, thin liquids.    Vitals:    08/09/21 1548   BP: 130/75   Pulse: 68   Resp: 18   Temp: 98  F (36.7  C)   SpO2: 96%   Weight: 50.4 kg (111 lb 3.2 oz)     Body mass index is 19.09 kg/m .    EXAMINATION:   General: Pleasant, very loquacious (and rather funny), frail-appearing elderly female, in no apparent distress.  She does appear a bit more confused this morning, going on and on and is jumping from 1 subject to another, sometimes incomprehensible.  Head: Normocephalic and atraumatic.   Eyes: PERRLA, sclerae clear.  Eyes are a bit red rimmed.  ENT: Moist oral mucosa.  She has many of her own teeth.  No rhinorrhea or nasal discharge.  Hearing appears to be quite good.  Cardiovascular: Regular rate and rhythm with intermittent ectopy and no appreciable murmur.   Respiratory: Lungs clear to auscultation bilaterally.   Abdomen: Nondistended.   Musculoskeletal/Extremities: Age-related degenerative joint disease.  Bilateral moderate to severe hallux valgus deformities and hammertoes.  Currently, somewhat mild lower extremity edema.  Port-A-Cath in the right upper chest wall.  Integument: Hypertrophic lower extremity skin from venous stasis.  Otherwise, no rashes, clinically significant lesions, or skin breakdown.   Cognitive/Psychiatric: Clearly trying to have some cognitive deficits, though affect is quite upbeat.    DIAGNOSTICS:   Last Comprehensive Metabolic Panel:  Sodium   Date Value Ref Range Status   07/21/2021 142 133 - 144 mmol/L  Final   06/14/2021 141 133 - 144 mmol/L Final     Potassium   Date Value Ref Range Status   07/21/2021 3.6 3.4 - 5.3 mmol/L Final   06/14/2021 3.7 3.4 - 5.3 mmol/L Final     Chloride   Date Value Ref Range Status   07/21/2021 111 (H) 94 - 109 mmol/L Final   06/14/2021 106 94 - 109 mmol/L Final     Carbon Dioxide   Date Value Ref Range Status   06/14/2021 31 20 - 32 mmol/L Final     Carbon Dioxide (CO2)   Date Value Ref Range Status   07/21/2021 27 20 - 32 mmol/L Final     Anion Gap   Date Value Ref Range Status   07/21/2021 4 3 - 14 mmol/L Final   06/14/2021 4 3 - 14 mmol/L Final     Glucose   Date Value Ref Range Status   07/21/2021 87 70 - 99 mg/dL Final   06/14/2021 66 (L) 70 - 99 mg/dL Final     Urea Nitrogen   Date Value Ref Range Status   07/21/2021 20 7 - 30 mg/dL Final   06/14/2021 24 7 - 30 mg/dL Final     Creatinine   Date Value Ref Range Status   07/21/2021 0.58 0.52 - 1.04 mg/dL Final   06/14/2021 0.70 0.52 - 1.04 mg/dL Final     GFR Estimate   Date Value Ref Range Status   07/21/2021 86 >60 mL/min/1.73m2 Final     Comment:     As of July 11, 2021, eGFR is calculated by the CKD-EPI creatinine equation, without race adjustment. eGFR can be influenced by muscle mass, exercise, and diet. The reported eGFR is an estimation only and is only applicable if the renal function is stable.   06/14/2021 80 >60 mL/min/[1.73_m2] Final     Comment:     Non  GFR Calc  Starting 12/18/2018, serum creatinine based estimated GFR (eGFR) will be   calculated using the Chronic Kidney Disease Epidemiology Collaboration   (CKD-EPI) equation.       Calcium   Date Value Ref Range Status   07/21/2021 8.4 (L) 8.5 - 10.1 mg/dL Final   06/14/2021 9.5 8.5 - 10.1 mg/dL Final       Lab Results   Component Value Date    WBC 4.9 07/21/2021    WBC 5.0 06/14/2021     Lab Results   Component Value Date    RBC 3.53 07/21/2021    RBC 3.96 06/14/2021     Lab Results   Component Value Date    HGB 11.5 07/21/2021    HGB 12.8  06/14/2021     Lab Results   Component Value Date    HCT 36.5 07/21/2021    HCT 39.3 06/14/2021     Lab Results   Component Value Date     07/21/2021    MCV 99 06/14/2021     Lab Results   Component Value Date    MCH 32.6 07/21/2021    MCH 32.3 06/14/2021     Lab Results   Component Value Date    MCHC 31.5 07/21/2021    MCHC 32.6 06/14/2021     Lab Results   Component Value Date    RDW 13.7 07/21/2021    RDW 14.2 06/14/2021     Lab Results   Component Value Date     07/21/2021     06/14/2021       ASSESSMENT/Plan:      ICD-10-CM    1. Periprosthetic fracture of right hip, subsequent encounter  M97.8XXD     Z96.649    2. Fall, subsequent encounter  W19.XXXD    3. History of malignant neoplasm of anus  Z85.048    4. Mild cognitive impairment  G31.84    5. Atrophic vaginitis  N95.2    6. Insomnia, unspecified type  G47.00        CHANGES:    Increase trazodone from 25 mg to 50 mg at bedtime.    CARE PLAN:    The care plan, medications, vital signs, orders, and nursing notes have been reviewed, and all orders signed. Changes to care plan, if any, as noted. Otherwise, continue current plan of care.    The above has been created using voice recognition software. Please be aware that this may unintentionally  produce inaccuracies and/or nonsensical sentences.      Electronically signed by: GRAHAM Trejo CNP

## 2021-08-16 ENCOUNTER — TRANSITIONAL CARE UNIT VISIT (OUTPATIENT)
Dept: GERIATRICS | Facility: CLINIC | Age: 81
End: 2021-08-16
Payer: MEDICARE

## 2021-08-16 DIAGNOSIS — S72.001A CLOSED FRACTURE OF NECK OF RIGHT FEMUR, INITIAL ENCOUNTER (H): ICD-10-CM

## 2021-08-16 DIAGNOSIS — R41.3 MEMORY PROBLEM: ICD-10-CM

## 2021-08-16 DIAGNOSIS — G31.84 MILD COGNITIVE IMPAIRMENT: ICD-10-CM

## 2021-08-16 DIAGNOSIS — N95.2 ATROPHIC VAGINITIS: ICD-10-CM

## 2021-08-16 DIAGNOSIS — Z96.649 PERIPROSTHETIC FRACTURE OF HIP, SUBSEQUENT ENCOUNTER: Primary | ICD-10-CM

## 2021-08-16 DIAGNOSIS — G47.00 INSOMNIA, UNSPECIFIED TYPE: ICD-10-CM

## 2021-08-16 DIAGNOSIS — Z85.048 HISTORY OF MALIGNANT NEOPLASM OF ANUS: ICD-10-CM
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
## 2021-08-16 DIAGNOSIS — M97.8XXD PERIPROSTHETIC FRACTURE OF HIP, SUBSEQUENT ENCOUNTER: Primary | ICD-10-CM

## 2021-08-16 PROCEDURE — 99309 SBSQ NF CARE MODERATE MDM 30: CPT | Performed by: NURSE PRACTITIONER

## 2021-08-16 ASSESSMENT — MIFFLIN-ST. JEOR: SCORE: 949.4

## 2021-08-16 NOTE — LETTER
"    2021        RE: Elizabeth Taylor  [address]  [address]  [place] [address]        [facility]    Name:   Elizabeth Taylor  :   1940  MRN:    [id number]     Facility:   [facility] ([facility]) [[id number]]   Room:   Code Status: FULL CODE and POLST AVAILABLE -     DOS: 2021  Previous visit: 2021    PCP:  Jann Henderson    CHIEF COMPLAINT / REASON FOR VISIT:  Chief Complaint   Patient presents with     Clinic Care Coordination - Follow-up     Periprosthetic fracture of the right hip      [facility] from 2021 until 2021 (right periprosthetic hip fracture)  [facility] TCU from 2021 until 08/10/2021 (approximate discharge date)      HPI: Elizabeth is an 81 year old female with a past medical history significant for 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  She had tried managing right hip pain as an outpatient, but it soon became unbearable.  X-rays were performed at the direction of her PCP, and they showed \"suspicion of fracture\" of the right hip.  In the ED, a CT showed a periprosthetic fracture.  Orthopedics evaluated patient and advised that she could be weightbearing as tolerated with assistant without the need for surgical intervention.  She was admitted to the ED observation unit and was not treated with scheduled acetaminophen, gabapentin, cyclobenzaprine, Lidoderm patch, and as needed oxycodone.  Her pain was well controlled with these measures.  However, she did have some confusion, and the patient was discussed with her sister, Sahil.  Sahil confirmed that the patient has baseline memory problems.  Patient and ultimately the TCU were advised " "to only use oxycodone for severe breakthrough pain, as there is concern of worsening her confusion.  Prior to discharge, the cyclobenzaprine was discontinued, and he was discharged to the TCU on the remaining medications.  She can be weightbearing as tolerated with a walker and should follow-up with Dr. Phan in 6 weeks for repeat x-rays.      CURRENT/RECENT TCU ISSUES    Disposition: She keeps praising therapy people.  According to PT, she has made so much more progress in the last 2 days.  She is now walking around the entire floor.  Pain is improved, and she is walking with a 2 wheeled walker.  She also uses the walker to transfer.  She earlier complained of some low back pain on the left, but that is improving. She has not asked for any pain medications and is simply given scheduled ones.  She does have some discomfort in the sacral area, mostly when up in the chair, for the last 3 or 4 days.  She continues to have regular bowel movements multiple times per day.    She has had left arm pain and had limited active ROM.  Pain is diminished with exercise, although she still has localized pain in the posterior upper arm.    Cognition: She does have a bit confused this morning.  At the initial visit, the patient admitted to some confusion, stating that she did not know where she was.  Speech tended to be rambling.  When asked the year, she answered correctly with a slight delay; however, when asked the month, she said, \"August, no, it's not August yet.  It's June.  My birthday is [birth year].  She apparently forgot that she had a birthday just 12 days before.  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  I requested OT or SLP to perform a cognitive assessment.    Insomnia: She had been unable to sleep.  We recently added 25 mg of trazodone at bedtime, later increasing that to 50 mg, and it appears to be working.  She talks about years worth of \"flexible sleeping.\"  She says, \"they " "must have given me something,\" because she is sleeping much better and feels more restful in the morning.  I explained to her that we did order something to help her sleep.    Discharge planning: She tells me she is doing better, and when I talked to her about going home, she says, \"I think maybe not.\"  She lives alone in an apartment although she doesn't think she can live alone anymore.  There is a least mild cognitive impairment.  According to therapy, she needs 24-hour care.    Apparently, she would like to stay here.  \"This is the best place in the world for me.  I want to get well, but I don't want to leave here,\" she says.  She may be aware that she cannot really go home.      ROS: No headaches or chest pains, coughing or congestion, nausea or vomiting, dizziness or dyspnea, dysuria, difficulty chewing or swallowing, integumentary issues, or problems with appetite.  The patient and nursing staff tell me that she is not sleeping.  She stated that it's normal for her.    Past Medical History:   Diagnosis Date     Anal cancer (H)      Endometriosis, site unspecified      History of malignant neoplasm of anus 7/26/2021     Periprosthetic fracture of right hip, subsequent encounter 7/26/2021     Thyroiditis, unspecified     Thryoiditis-resolved              Family History   Problem Relation Age of Onset     Cancer Sister      Cancer Maternal Grandmother         lymphoma     Heart Disease Father         MI     Uterine Cancer Niece      Social History     Socioeconomic History     Marital status: Single     Spouse name: None     Number of children: None     Years of education: None     Highest education level: None   Occupational History     None   Tobacco Use     Smoking status: Never Smoker     Smokeless tobacco: Never Used   Substance and Sexual Activity     Alcohol use: No     Drug use: No     Sexual activity: Yes     Partners: Male   Other Topics Concern     Parent/sibling w/ CABG, MI or angioplasty before 65F " 55M? Not Asked   Social History Narrative     None     Social Determinants of Health     Financial Resource Strain: Medium Risk     Difficulty of Paying Living Expenses: Somewhat hard   Food Insecurity: No Food Insecurity     Worried About Running Out of Food in the Last Year: Never true     Ran Out of Food in the Last Year: Never true   Transportation Needs: No Transportation Needs     Lack of Transportation (Medical): No     Lack of Transportation (Non-Medical): No   Physical Activity: Insufficiently Active     Days of Exercise per Week: 5 days     Minutes of Exercise per Session: 10 min   Stress: Stress Concern Present     Feeling of Stress : To some extent   Social Connections:      Frequency of Communication with Friends and Family:      Frequency of Social Gatherings with Friends and Family:      Attends Hinduism Services:      Active Member of Clubs or Organizations:      Attends Club or Organization Meetings:      Marital Status:    Intimate Partner Violence:      Fear of Current or Ex-Partner:      Emotionally Abused:      Physically Abused:      Sexually Abused:        MEDICATIONS: Reviewed from the MAR, physician orders, and/or earlier progress notes.  Post Discharge Medication Reconciliation Status: medication reconcilation previously completed during another office visit.    Current Outpatient Medications   Medication Sig     acetaminophen (TYLENOL) 500 MG tablet Take 1,000 mg by mouth 3 times daily     Calcium-Magnesium-Zinc 333-133-5 MG TABS per tablet Take 1 tablet by mouth daily     cholecalciferol (VITAMIN D3) 5000 units (125 mcg) capsule 1 CAP BY MOUTH DAILY (DX: OSTEOPOROSIS)     conjugated estrogens (PREMARIN) 0.625 MG/GM vaginal cream Apply locally to clitoral randall pea sized amount daily for 2 weeks then prn. (Patient not taking: Reported on 6/28/2021)     docusate sodium (COLACE) 100 MG capsule Take 1 capsule (100 mg) by mouth 2 times daily (Patient not taking: Reported on 6/28/2021)      "estradiol (ESTRACE) 0.1 MG/GM vaginal cream Apply locally to clitoral randall pea sized amount daily for 2 weeks then prn. (Patient not taking: Reported on 6/28/2021)     gabapentin (NEURONTIN) 300 MG capsule Take 1 capsule (300 mg) by mouth 3 times daily     Lidocaine (LIDOCARE) 4 % Patch Place 2 patches onto the skin every 24 hours To prevent lidocaine toxicity, patient should be patch free for 12 hrs daily.     metroNIDAZOLE (METROGEL) 0.75 % external gel Apply topically 2 times daily     oxyCODONE (ROXICODONE) 5 MG tablet Take 0.5 tablets (2.5 mg) by mouth every 6 hours as needed for pain     polyethylene glycol (MIRALAX) 17 GM/Dose powder Take 17 g by mouth daily     traZODone (DESYREL) 50 MG tablet Take 50 mg by mouth At Bedtime     vitamin B complex with vitamin C (VITAMIN  B COMPLEX) TABS tablet Take 1 tablet by mouth daily     VITAMIN E PO Take 1 capsule by mouth daily     Current Facility-Administered Medications   Medication     heparin 100 UNIT/ML injection 500 Units     ALLERGIES:   Allergies   Allergen Reactions     Darvon [Propoxyphene]      Had reaction in teen years     Ketoconazole Rash     Penicillins Hives     As a child     DIET: Regular, regular texture, thin liquids.    Vitals:    08/16/21 1221   BP: 102/71   Pulse: 64   Resp: 18   Temp: 98.5  F (36.9  C)   SpO2: 95%   Weight: 50.4 kg (111 lb 3.2 oz)   Height: 1.626 m (5' 4\")     Body mass index is 19.09 kg/m .    EXAMINATION:   General: Pleasant, very loquacious (and rather funny), frail-appearing elderly female, in no apparent distress.  She does appear a bit more confused this morning, going on and on and is jumping from 1 subject to another, sometimes incomprehensible.  Head: Normocephalic and atraumatic.   Eyes: PERRLA, sclerae clear.  Eyes are a bit red rimmed.  ENT: Moist oral mucosa.  She has many of her own teeth.  No rhinorrhea or nasal discharge.  Hearing appears to be quite good.  Cardiovascular: Regular rate and rhythm with " intermittent ectopy and no appreciable murmur.   Respiratory: Lungs clear to auscultation bilaterally.   Abdomen: Nondistended.   Musculoskeletal/Extremities: Age-related degenerative joint disease.  Bilateral moderate to severe hallux valgus deformities and hammertoes.  Currently, somewhat mild lower extremity edema.  Port-A-Cath in the right upper chest wall.  Integument: Hypertrophic lower extremity skin from venous stasis.  Otherwise, no rashes, clinically significant lesions, or skin breakdown.   Cognitive/Psychiatric: Clearly trying to have some cognitive deficits, though affect is quite upbeat.    DIAGNOSTICS:   Last Comprehensive Metabolic Panel:  Sodium   Date Value Ref Range Status   07/21/2021 142 133 - 144 mmol/L Final   06/14/2021 141 133 - 144 mmol/L Final     Potassium   Date Value Ref Range Status   07/21/2021 3.6 3.4 - 5.3 mmol/L Final   06/14/2021 3.7 3.4 - 5.3 mmol/L Final     Chloride   Date Value Ref Range Status   07/21/2021 111 (H) 94 - 109 mmol/L Final   06/14/2021 106 94 - 109 mmol/L Final     Carbon Dioxide   Date Value Ref Range Status   06/14/2021 31 20 - 32 mmol/L Final     Carbon Dioxide (CO2)   Date Value Ref Range Status   07/21/2021 27 20 - 32 mmol/L Final     Anion Gap   Date Value Ref Range Status   07/21/2021 4 3 - 14 mmol/L Final   06/14/2021 4 3 - 14 mmol/L Final     Glucose   Date Value Ref Range Status   07/21/2021 87 70 - 99 mg/dL Final   06/14/2021 66 (L) 70 - 99 mg/dL Final     Urea Nitrogen   Date Value Ref Range Status   07/21/2021 20 7 - 30 mg/dL Final   06/14/2021 24 7 - 30 mg/dL Final     Creatinine   Date Value Ref Range Status   07/21/2021 0.58 0.52 - 1.04 mg/dL Final   06/14/2021 0.70 0.52 - 1.04 mg/dL Final     GFR Estimate   Date Value Ref Range Status   07/21/2021 86 >60 mL/min/1.73m2 Final     Comment:     As of July 11, 2021, eGFR is calculated by the CKD-EPI creatinine equation, without race adjustment. eGFR can be influenced by muscle mass, exercise, and  diet. The reported eGFR is an estimation only and is only applicable if the renal function is stable.   06/14/2021 80 >60 mL/min/[1.73_m2] Final     Comment:     Non  GFR Calc  Starting 12/18/2018, serum creatinine based estimated GFR (eGFR) will be   calculated using the Chronic Kidney Disease Epidemiology Collaboration   (CKD-EPI) equation.       Calcium   Date Value Ref Range Status   07/21/2021 8.4 (L) 8.5 - 10.1 mg/dL Final   06/14/2021 9.5 8.5 - 10.1 mg/dL Final       Lab Results   Component Value Date    WBC 4.9 07/21/2021    WBC 5.0 06/14/2021     Lab Results   Component Value Date    RBC 3.53 07/21/2021    RBC 3.96 06/14/2021     Lab Results   Component Value Date    HGB 11.5 07/21/2021    HGB 12.8 06/14/2021     Lab Results   Component Value Date    HCT 36.5 07/21/2021    HCT 39.3 06/14/2021     Lab Results   Component Value Date     07/21/2021    MCV 99 06/14/2021     Lab Results   Component Value Date    MCH 32.6 07/21/2021    MCH 32.3 06/14/2021     Lab Results   Component Value Date    MCHC 31.5 07/21/2021    MCHC 32.6 06/14/2021     Lab Results   Component Value Date    RDW 13.7 07/21/2021    RDW 14.2 06/14/2021     Lab Results   Component Value Date     07/21/2021     06/14/2021       ASSESSMENT/Plan:      ICD-10-CM    1. Periprosthetic fracture of right hip, subsequent encounter  M97.8XXD     Z96.649    2. Fall, subsequent encounter  W19.XXXD    3. History of malignant neoplasm of anus  Z85.048    4. Mild cognitive impairment  G31.84    5. Atrophic vaginitis  N95.2    6. Insomnia, unspecified type  G47.00    7. Closed fracture of neck of right femur, initial encounter (H)  S72.001A    8. Memory problem  R41.3        CHANGES:    None.    CARE PLAN:    The care plan, medications, vital signs, orders, and nursing notes have been reviewed, and all orders signed. Changes to care plan, if any, as noted. Otherwise, continue current plan of care.    The above has been  created using voice recognition software. Please be aware that this may unintentionally  produce inaccuracies and/or nonsensical sentences.      Electronically signed by: GRAHAM Trejo CNP        Sincerely,        GRAHAM Trejo CNP

## 2021-08-17 VITALS
OXYGEN SATURATION: 95 % | HEIGHT: 64 IN | DIASTOLIC BLOOD PRESSURE: 71 MMHG | HEART RATE: 64 BPM | TEMPERATURE: 98.5 F | SYSTOLIC BLOOD PRESSURE: 102 MMHG | WEIGHT: 111.2 LBS | RESPIRATION RATE: 18 BRPM | BODY MASS INDEX: 18.98 KG/M2

## 2021-08-18 ENCOUNTER — TELEPHONE (OUTPATIENT)
Dept: GERIATRICS | Facility: CLINIC | Age: 81
End: 2021-08-18

## 2021-08-18 NOTE — TELEPHONE ENCOUNTER
[facility] Nurse Triage Telephone Note    Provider: OLIVIA Helms  Facility: [facility]  Facility Type:  LTC    Caller: Kristel   Call Back Number: [phone]    Allergies:    Allergies   Allergen Reactions     Darvon [Propoxyphene]      Had reaction in teen years     Ketoconazole Rash     Penicillins Hives     As a child        Reason for call: Nursing is calling as the pt is requesting to have her trazodone discontinued as she feels that it makes her feel confused and incontinent during the night.     Verbal Order/Direction given by Provider: discontinue trazodone.     Provider giving Order:  OLIVIA Helms    Verbal Order given to: Jeannette Owens RN

## 2021-08-18 NOTE — PROGRESS NOTES
"[facility]    Name:   Elizabeth Taylor  :   1940  MRN:    [id number]     Facility:   [facility] ([facility]) [[id number]]   Room:   Code Status: FULL CODE and POLST AVAILABLE -     DOS: 2021  Previous visit: 2021    PCP:  Jnan Henderson    CHIEF COMPLAINT / REASON FOR VISIT:  Chief Complaint   Patient presents with     Clinic Care Coordination - Follow-up     Periprosthetic fracture of the right hip      [facility] from 2021 until 2021 (right periprosthetic hip fracture)  [facility] from 2021 until 08/10/2021 (approximate discharge date)      HPI: Elizabeth is an 81 year old female with a past medical history significant for 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  She had tried managing right hip pain as an outpatient, but it soon became unbearable.  X-rays were performed at the direction of her PCP, and they showed \"suspicion of fracture\" of the right hip.  In the ED, a CT showed a periprosthetic fracture.  Orthopedics evaluated patient and advised that she could be weightbearing as tolerated with assistant without the need for surgical intervention.  She was admitted to the ED observation unit and was not treated with scheduled acetaminophen, gabapentin, cyclobenzaprine, Lidoderm patch, and as needed oxycodone.  Her pain was well controlled with these measures.  However, she did have some confusion, and the patient was discussed with her sister, Sahil.  Sahil confirmed that the patient has baseline memory problems.  Patient and ultimately the TCU were advised to only use oxycodone for severe breakthrough pain, as there is concern of worsening her confusion.  " "Prior to discharge, the cyclobenzaprine was discontinued, and he was discharged to the TCU on the remaining medications.  She can be weightbearing as tolerated with a walker and should follow-up with Dr. Phan in 6 weeks for repeat x-rays.      CURRENT/RECENT TCU ISSUES    Disposition: She keeps praising therapy people.  According to PT, she has made so much more progress in the last 2 days.  She is now walking around the entire floor.  Pain is improved, and she is walking with a 2 wheeled walker.  She also uses the walker to transfer.  She earlier complained of some low back pain on the left, but that is improving. She has not asked for any pain medications and is simply given scheduled ones.  She does have some discomfort in the sacral area, mostly when up in the chair, for the last 3 or 4 days.  She continues to have regular bowel movements multiple times per day.    She has had left arm pain and had limited active ROM.  Pain is diminished with exercise, although she still has localized pain in the posterior upper arm.    Cognition: She does have a bit confused this morning.  At the initial visit, the patient admitted to some confusion, stating that she did not know where she was.  Speech tended to be rambling.  When asked the year, she answered correctly with a slight delay; however, when asked the month, she said, \"August, no, it's not August yet.  It's June.  My birthday is [birth year].  She apparently forgot that she had a birthday just 12 days before.  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  I requested OT or SLP to perform a cognitive assessment.    Insomnia: She had been unable to sleep.  We recently added 25 mg of trazodone at bedtime, later increasing that to 50 mg, and it appears to be working.  She talks about years worth of \"flexible sleeping.\"  She says, \"they must have given me something,\" because she is sleeping much better and feels more restful in the " "morning.  I explained to her that we did order something to help her sleep.    Discharge planning: She tells me she is doing better, and when I talked to her about going home, she says, \"I think maybe not.\"  She lives alone in an apartment although she doesn't think she can live alone anymore.  There is a least mild cognitive impairment.  According to therapy, she needs 24-hour care.    Apparently, she would like to stay here.  \"This is the best place in the world for me.  I want to get well, but I don't want to leave here,\" she says.  She may be aware that she cannot really go home.      ROS: No headaches or chest pains, coughing or congestion, nausea or vomiting, dizziness or dyspnea, dysuria, difficulty chewing or swallowing, integumentary issues, or problems with appetite.  The patient and nursing staff tell me that she is not sleeping.  She stated that it's normal for her.    Past Medical History:   Diagnosis Date     Anal cancer (H)      Endometriosis, site unspecified      History of malignant neoplasm of anus 7/26/2021     Periprosthetic fracture of right hip, subsequent encounter 7/26/2021     Thyroiditis, unspecified     Thryoiditis-resolved              Family History   Problem Relation Age of Onset     Cancer Sister      Cancer Maternal Grandmother         lymphoma     Heart Disease Father         MI     Uterine Cancer Niece      Social History     Socioeconomic History     Marital status: Single     Spouse name: None     Number of children: None     Years of education: None     Highest education level: None   Occupational History     None   Tobacco Use     Smoking status: Never Smoker     Smokeless tobacco: Never Used   Substance and Sexual Activity     Alcohol use: No     Drug use: No     Sexual activity: Yes     Partners: Male   Other Topics Concern     Parent/sibling w/ CABG, MI or angioplasty before 65F 55M? Not Asked   Social History Narrative     None     Social Determinants of Health "     Financial Resource Strain: Medium Risk     Difficulty of Paying Living Expenses: Somewhat hard   Food Insecurity: No Food Insecurity     Worried About Running Out of Food in the Last Year: Never true     Ran Out of Food in the Last Year: Never true   Transportation Needs: No Transportation Needs     Lack of Transportation (Medical): No     Lack of Transportation (Non-Medical): No   Physical Activity: Insufficiently Active     Days of Exercise per Week: 5 days     Minutes of Exercise per Session: 10 min   Stress: Stress Concern Present     Feeling of Stress : To some extent   Social Connections:      Frequency of Communication with Friends and Family:      Frequency of Social Gatherings with Friends and Family:      Attends [employer] Services:      Active Member of Clubs or Organizations:      Attends Club or Organization Meetings:      Marital Status:    Intimate Partner Violence:      Fear of Current or Ex-Partner:      Emotionally Abused:      Physically Abused:      Sexually Abused:        MEDICATIONS: Reviewed from the MAR, physician orders, and/or earlier progress notes.  Post Discharge Medication Reconciliation Status: medication reconcilation previously completed during another office visit.    Current Outpatient Medications   Medication Sig     acetaminophen (TYLENOL) 500 MG tablet Take 1,000 mg by mouth 3 times daily     Calcium-Magnesium-Zinc 333-133-5 MG TABS per tablet Take 1 tablet by mouth daily     cholecalciferol (VITAMIN D3) 5000 units (125 mcg) capsule 1 CAP BY MOUTH DAILY (DX: OSTEOPOROSIS)     conjugated estrogens (PREMARIN) 0.625 MG/GM vaginal cream Apply locally to clitoral randall pea sized amount daily for 2 weeks then prn. (Patient not taking: Reported on 6/28/2021)     docusate sodium (COLACE) 100 MG capsule Take 1 capsule (100 mg) by mouth 2 times daily (Patient not taking: Reported on 6/28/2021)     estradiol (ESTRACE) 0.1 MG/GM vaginal cream Apply locally to clitoral randlal pea sized  "amount daily for 2 weeks then prn. (Patient not taking: Reported on 6/28/2021)     gabapentin (NEURONTIN) 300 MG capsule Take 1 capsule (300 mg) by mouth 3 times daily     Lidocaine (LIDOCARE) 4 % Patch Place 2 patches onto the skin every 24 hours To prevent lidocaine toxicity, patient should be patch free for 12 hrs daily.     metroNIDAZOLE (METROGEL) 0.75 % external gel Apply topically 2 times daily     oxyCODONE (ROXICODONE) 5 MG tablet Take 0.5 tablets (2.5 mg) by mouth every 6 hours as needed for pain     polyethylene glycol (MIRALAX) 17 GM/Dose powder Take 17 g by mouth daily     traZODone (DESYREL) 50 MG tablet Take 50 mg by mouth At Bedtime     vitamin B complex with vitamin C (VITAMIN  B COMPLEX) TABS tablet Take 1 tablet by mouth daily     VITAMIN E PO Take 1 capsule by mouth daily     Current Facility-Administered Medications   Medication     heparin 100 UNIT/ML injection 500 Units     ALLERGIES:   Allergies   Allergen Reactions     Darvon [Propoxyphene]      Had reaction in teen years     Ketoconazole Rash     Penicillins Hives     As a child     DIET: Regular, regular texture, thin liquids.    Vitals:    08/16/21 1221   BP: 102/71   Pulse: 64   Resp: 18   Temp: 98.5  F (36.9  C)   SpO2: 95%   Weight: 50.4 kg (111 lb 3.2 oz)   Height: 1.626 m (5' 4\")     Body mass index is 19.09 kg/m .    EXAMINATION:   General: Pleasant, very loquacious (and rather funny), frail-appearing elderly female, in no apparent distress.  She does appear a bit more confused this morning, going on and on and is jumping from 1 subject to another, sometimes incomprehensible.  Head: Normocephalic and atraumatic.   Eyes: PERRLA, sclerae clear.  Eyes are a bit red rimmed.  ENT: Moist oral mucosa.  She has many of her own teeth.  No rhinorrhea or nasal discharge.  Hearing appears to be quite good.  Cardiovascular: Regular rate and rhythm with intermittent ectopy and no appreciable murmur.   Respiratory: Lungs clear to auscultation " bilaterally.   Abdomen: Nondistended.   Musculoskeletal/Extremities: Age-related degenerative joint disease.  Bilateral moderate to severe hallux valgus deformities and hammertoes.  Currently, somewhat mild lower extremity edema.  Port-A-Cath in the right upper chest wall.  Integument: Hypertrophic lower extremity skin from venous stasis.  Otherwise, no rashes, clinically significant lesions, or skin breakdown.   Cognitive/Psychiatric: Clearly trying to have some cognitive deficits, though affect is quite upbeat.    DIAGNOSTICS:   Last Comprehensive Metabolic Panel:  Sodium   Date Value Ref Range Status   07/21/2021 142 133 - 144 mmol/L Final   06/14/2021 141 133 - 144 mmol/L Final     Potassium   Date Value Ref Range Status   07/21/2021 3.6 3.4 - 5.3 mmol/L Final   06/14/2021 3.7 3.4 - 5.3 mmol/L Final     Chloride   Date Value Ref Range Status   07/21/2021 111 (H) 94 - 109 mmol/L Final   06/14/2021 106 94 - 109 mmol/L Final     Carbon Dioxide   Date Value Ref Range Status   06/14/2021 31 20 - 32 mmol/L Final     Carbon Dioxide (CO2)   Date Value Ref Range Status   07/21/2021 27 20 - 32 mmol/L Final     Anion Gap   Date Value Ref Range Status   07/21/2021 4 3 - 14 mmol/L Final   06/14/2021 4 3 - 14 mmol/L Final     Glucose   Date Value Ref Range Status   07/21/2021 87 70 - 99 mg/dL Final   06/14/2021 66 (L) 70 - 99 mg/dL Final     Urea Nitrogen   Date Value Ref Range Status   07/21/2021 20 7 - 30 mg/dL Final   06/14/2021 24 7 - 30 mg/dL Final     Creatinine   Date Value Ref Range Status   07/21/2021 0.58 0.52 - 1.04 mg/dL Final   06/14/2021 0.70 0.52 - 1.04 mg/dL Final     GFR Estimate   Date Value Ref Range Status   07/21/2021 86 >60 mL/min/1.73m2 Final     Comment:     As of July 11, 2021, eGFR is calculated by the CKD-EPI creatinine equation, without race adjustment. eGFR can be influenced by muscle mass, exercise, and diet. The reported eGFR is an estimation only and is only applicable if the renal function is  stable.   06/14/2021 80 >60 mL/min/[1.73_m2] Final     Comment:     Non  GFR Calc  Starting 12/18/2018, serum creatinine based estimated GFR (eGFR) will be   calculated using the Chronic Kidney Disease Epidemiology Collaboration   (CKD-EPI) equation.       Calcium   Date Value Ref Range Status   07/21/2021 8.4 (L) 8.5 - 10.1 mg/dL Final   06/14/2021 9.5 8.5 - 10.1 mg/dL Final       Lab Results   Component Value Date    WBC 4.9 07/21/2021    WBC 5.0 06/14/2021     Lab Results   Component Value Date    RBC 3.53 07/21/2021    RBC 3.96 06/14/2021     Lab Results   Component Value Date    HGB 11.5 07/21/2021    HGB 12.8 06/14/2021     Lab Results   Component Value Date    HCT 36.5 07/21/2021    HCT 39.3 06/14/2021     Lab Results   Component Value Date     07/21/2021    MCV 99 06/14/2021     Lab Results   Component Value Date    MCH 32.6 07/21/2021    MCH 32.3 06/14/2021     Lab Results   Component Value Date    MCHC 31.5 07/21/2021    MCHC 32.6 06/14/2021     Lab Results   Component Value Date    RDW 13.7 07/21/2021    RDW 14.2 06/14/2021     Lab Results   Component Value Date     07/21/2021     06/14/2021       ASSESSMENT/Plan:      ICD-10-CM    1. Periprosthetic fracture of right hip, subsequent encounter  M97.8XXD     Z96.649    2. Fall, subsequent encounter  W19.XXXD    3. History of malignant neoplasm of anus  Z85.048    4. Mild cognitive impairment  G31.84    5. Atrophic vaginitis  N95.2    6. Insomnia, unspecified type  G47.00    7. Closed fracture of neck of right femur, initial encounter (H)  S72.001A    8. Memory problem  R41.3        CHANGES:    None.    CARE PLAN:    The care plan, medications, vital signs, orders, and nursing notes have been reviewed, and all orders signed. Changes to care plan, if any, as noted. Otherwise, continue current plan of care.    The above has been created using voice recognition software. Please be aware that this may unintentionally  produce  inaccuracies and/or nonsensical sentences.      Electronically signed by: GRAHAM Trejo CNP

## 2021-08-19 ENCOUNTER — TRANSITIONAL CARE UNIT VISIT (OUTPATIENT)
Dept: GERIATRICS | Facility: CLINIC | Age: 81
End: 2021-08-19
Payer: MEDICARE

## 2021-08-19 ENCOUNTER — PATIENT OUTREACH (OUTPATIENT)
Dept: OTHER | Facility: CLINIC | Age: 81
End: 2021-08-19

## 2021-08-19 VITALS
BODY MASS INDEX: 19.09 KG/M2 | SYSTOLIC BLOOD PRESSURE: 132 MMHG | HEART RATE: 67 BPM | RESPIRATION RATE: 16 BRPM | TEMPERATURE: 98.5 F | WEIGHT: 111.2 LBS | OXYGEN SATURATION: 96 % | DIASTOLIC BLOOD PRESSURE: 72 MMHG

## 2021-08-19 DIAGNOSIS — Z96.649 PERIPROSTHETIC FRACTURE OF HIP, SUBSEQUENT ENCOUNTER: Primary | ICD-10-CM

## 2021-08-19 DIAGNOSIS — Z85.048 HISTORY OF MALIGNANT NEOPLASM OF ANUS: ICD-10-CM

## 2021-08-19 DIAGNOSIS — M97.8XXD PERIPROSTHETIC FRACTURE OF HIP, SUBSEQUENT ENCOUNTER: Primary | ICD-10-CM

## 2021-08-19 DIAGNOSIS — N95.2 ATROPHIC VAGINITIS: ICD-10-CM

## 2021-08-19 DIAGNOSIS — G31.84 MILD COGNITIVE IMPAIRMENT: ICD-10-CM

## 2021-08-19 DIAGNOSIS — G47.00 INSOMNIA, UNSPECIFIED TYPE: ICD-10-CM

## 2021-08-19 PROCEDURE — 99309 SBSQ NF CARE MODERATE MDM 30: CPT | Performed by: NURSE PRACTITIONER

## 2021-08-19 NOTE — PROGRESS NOTES
"Community Paramedic Program  Community Health Worker Follow Up    Intervention and Education during outreach:     Received an email response from pt's friend Katherine. She wrote \"I believe Elizabeth is set to stay at [facility] till her sister Sahil is back from her vacation as she has funds through August (according to Elizabeth's care conference on 8/2/21). A decision will be made at that time, dependent on Elizabeth's progress at [facility] and ability to manage with safety and finances in another setting. I have messages for Elizabeth's [facility] team to provide updates and have talked with Elizabeth (she is uncertain).\" She encouraged me to call her with any other questions.    Pt called me twice and left two messages while I was on vacation. She invited me to a care conference scheduled for Monday, August 16th at noon. She said she would like me to be involved in \"planning for what happens in the future and where I ultimately end up.\" Per chart review, pt is now at [facility] ([facility]).     Emailed pt's friend Katherine and sister Sahil to check in. I asked if they need any resources or information to help them support Elizabeth to make the best choices for the future as possible. Also asked about how the transition to [facility] went for pt and if they would share an update on what their longer term plan is for pt's living situation. Offered to set up a time for a phone call next week, if they are interested.    CHW Plan:   1. Pt's friend and sister will connect with the CP CHW for updates and to ask for resources if needed.  2. CP CHW will follow up with pt's sister in 3-5 business days if she does not respond by then.  3. CP CHW will call pt in 1-2 business days to check in.  "

## 2021-08-19 NOTE — LETTER
"    2021        RE: Elizabeth Taylor  [address] [address]        [facility]    Name:   Elizabeth Taylor  :   1940  MRN:    [id number]     Fac[facility]   [facility] [[id number]]   Room:   Code Status: FULL CODE and POLST AVAILABLE -     DOS: 2021  Previous visit: 2021    PCP:  Jann Henderson    CHIEF COMPLAINT / REASON FOR VISIT:  Chief Complaint   Patient presents with     Clinic Care Coordination - Follow-up       Periprosthetic fracture of the right hip      [facility] from 2021 until 2021 (right periprosthetic hip fracture)  [facility] from 2021 until 2021 (approximate discharge date)      HPI: Elizabeth is an 81 year old female with a past medical history significant for 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  She had tried managing right hip pain as an outpatient, but it soon became unbearable.  X-rays were performed at the direction of her PCP, and they showed \"suspicion of fracture\" of the right hip.  In the ED, a CT showed a periprosthetic fracture.  Orthopedics evaluated patient and advised that she could be weightbearing as tolerated with assistant without the need for surgical intervention.  She was admitted to the ED observation unit and was not treated with scheduled acetaminophen, gabapentin, cyclobenzaprine, Lidoderm patch, and as needed oxycodone.  Her pain was well controlled with these measures.  However, she did have some confusion, and the patient was discussed with her sister, Sahil.  Sahil confirmed that the patient has baseline memory problems.  Patient and ultimately the TCU were advised to " "only use oxycodone for severe breakthrough pain, as there is concern of worsening her confusion.  Prior to discharge, the cyclobenzaprine was discontinued, and he was discharged to the TCU on the remaining medications.  She can be weightbearing as tolerated with a walker and should follow-up with Dr. Phan in 6 weeks for repeat x-rays.      CURRENT/RECENT TCU ISSUES    Disposition: She keeps praising therapy people.  According to PT, she has made significant progress.  She is now walking around the entire floor.  Pain is improved,  walking with a 2 wheeled walker.  She also uses the walker for transfers.  She earlier complained of some low back pain on the left, but that is improving. She has not asked for any pain medications and is simply given scheduled ones.  She does have some discomfort in the sacral area, mostly when up in the chair, for the last 3 or 4 days.  She continues to have regular bowel movements multiple times per day.    She has had left arm pain and had limited active ROM.  Pain is diminished with exercise, although she still has localized pain in the posterior upper arm.    Cognition: She does have a bit confused this morning.  At the initial visit, the patient admitted to some confusion, stating that she did not know where she was.  Speech tended to be rambling.  When asked the year, she answered correctly with a slight delay; however, when asked the month, she said, \"August, no, it's not August yet.  It's June.  My birthday is [birth year].  She apparently forgot that she had a birthday just 12 days before.  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  I requested OT or SLP to perform cognitive assessments which clearly indicated that assisted living or long-term care would be appropriate and that she requires 24-hour care.    Insomnia: She had been unable to sleep.  She told me that it had been \"catastrophic the first night.\"  We recently added 25 mg of " "trazodone at bedtime, later increasing that to 50 mg, and it appeared to be working.  She has describes, however, years worth of \"flexible sleeping.\"  Ultimately, she requested that the trazodone be discontinued, as she felt it made her confused and incontinent during the night.    Discharge planning: She had a care conference on 08/17.  Her last day of therapy is today, I believe, and that she is expected to discharge to board and care later this week.  She has been living alone in an apartment although she is aware she can live alone anymore.  There is a least mild cognitive impairment.  According to therapy, as noted, she requires 24-hour care.    Apparently, she would like to stay here.  \"This is the best place in the world for me.  I want to get well, but I don't want to leave here,\" she says.  She may be aware that she cannot really go home.      ROS: No headaches or chest pains, coughing or congestion, nausea or vomiting, dizziness or dyspnea, dysuria, difficulty chewing or swallowing, integumentary issues, or problems with appetite.  The patient and nursing staff tell me that she is not sleeping.  She stated that it's normal for her.    Past Medical History:   Diagnosis Date     Anal cancer (H)      Endometriosis, site unspecified      History of malignant neoplasm of anus 7/26/2021     Periprosthetic fracture of right hip, subsequent encounter 7/26/2021     Thyroiditis, unspecified     Thryoiditis-resolved              Family History   Problem Relation Age of Onset     Cancer Sister      Cancer Maternal Grandmother         lymphoma     Heart Disease Father         MI     Uterine Cancer Niece      Social History     Socioeconomic History     Marital status: Single     Spouse name: None     Number of children: None     Years of education: None     Highest education level: None   Occupational History     None   Tobacco Use     Smoking status: Never Smoker     Smokeless tobacco: Never Used   Substance and " Sexual Activity     Alcohol use: No     Drug use: No     Sexual activity: Yes     Partners: Male   Other Topics Concern     Parent/sibling w/ CABG, MI or angioplasty before 65F 55M? Not Asked   Social History Narrative     None     Social Determinants of Health     Financial Resource Strain: Medium Risk     Difficulty of Paying Living Expenses: Somewhat hard   Food Insecurity: No Food Insecurity     Worried About Running Out of Food in the Last Year: Never true     Ran Out of Food in the Last Year: Never true   Transportation Needs: No Transportation Needs     Lack of Transportation (Medical): No     Lack of Transportation (Non-Medical): No   Physical Activity: Insufficiently Active     Days of Exercise per Week: 5 days     Minutes of Exercise per Session: 10 min   Stress: Stress Concern Present     Feeling of Stress : To some extent   Social Connections:      Frequency of Communication with Friends and Family:      Frequency of Social Gatherings with Friends and Family:      Attends [employer] Services:      Active Member of Clubs or Organizations:      Attends Club or Organization Meetings:      Marital Status:    Intimate Partner Violence:      Fear of Current or Ex-Partner:      Emotionally Abused:      Physically Abused:      Sexually Abused:        MEDICATIONS: Reviewed from the MAR, physician orders, and/or earlier progress notes.  Post Discharge Medication Reconciliation Status: medication reconcilation previously completed during another office visit.    Current Outpatient Medications   Medication Sig     acetaminophen (TYLENOL) 500 MG tablet Take 1,000 mg by mouth 3 times daily     Calcium-Magnesium-Zinc 333-133-5 MG TABS per tablet Take 1 tablet by mouth daily     cholecalciferol (VITAMIN D3) 5000 units (125 mcg) capsule 1 CAP BY MOUTH DAILY (DX: OSTEOPOROSIS)     conjugated estrogens (PREMARIN) 0.625 MG/GM vaginal cream Apply locally to clitoral randall pea sized amount daily for 2 weeks then prn. (Patient  not taking: Reported on 6/28/2021)     docusate sodium (COLACE) 100 MG capsule Take 1 capsule (100 mg) by mouth 2 times daily (Patient not taking: Reported on 6/28/2021)     estradiol (ESTRACE) 0.1 MG/GM vaginal cream Apply locally to clitoral randall pea sized amount daily for 2 weeks then prn. (Patient not taking: Reported on 6/28/2021)     gabapentin (NEURONTIN) 300 MG capsule Take 1 capsule (300 mg) by mouth 3 times daily     metroNIDAZOLE (METROGEL) 0.75 % external gel Apply topically 2 times daily     oxyCODONE (ROXICODONE) 5 MG tablet Take 0.5 tablets (2.5 mg) by mouth every 6 hours as needed for pain     polyethylene glycol (MIRALAX) 17 GM/Dose powder Take 17 g by mouth daily     vitamin B complex with vitamin C (VITAMIN  B COMPLEX) TABS tablet Take 1 tablet by mouth daily     VITAMIN E PO Take 1 capsule by mouth daily     Current Facility-Administered Medications   Medication     heparin 100 UNIT/ML injection 500 Units     ALLERGIES:   Allergies   Allergen Reactions     Darvon [Propoxyphene]      Had reaction in teen years     Ketoconazole Rash     Penicillins Hives     As a child     DIET: Regular, regular texture, thin liquids.    Vitals:    08/19/21 1414   BP: 132/72   Pulse: 67   Resp: 16   Temp: 98.5  F (36.9  C)   SpO2: 96%   Weight: 50.4 kg (111 lb 3.2 oz)     Body mass index is 19.09 kg/m .    EXAMINATION:   General: Pleasant, very loquacious (and rather funny), frail-appearing elderly female, in no apparent distress.  She is up to the bathroom on her own using a wheelchair.  She is able to turn her wheelchair around in the bathroom with a good deal of effort, utilizing her feet.  Head: Normocephalic and atraumatic.   Eyes: PERRLA, sclerae clear.  Eyes are a bit red rimmed.  ENT: Moist oral mucosa.  She has many of her own teeth.  No rhinorrhea or nasal discharge.  Hearing appears to be quite good.  Cardiovascular: Regular rate and rhythm with intermittent ectopy and no appreciable murmur.    Respiratory: Lungs clear to auscultation bilaterally.   Abdomen: Nondistended.   Musculoskeletal/Extremities: Age-related degenerative joint disease.  Bilateral moderate to severe hallux valgus deformities and hammertoes.  Currently, somewhat mild lower extremity edema.  Port-A-Cath in the right upper chest wall.  Integument: Hypertrophic lower extremity skin from venous stasis.  Otherwise, no rashes, clinically significant lesions, or skin breakdown.   Cognitive/Psychiatric: Clearly trying to have some cognitive deficits, though affect is quite upbeat.    DIAGNOSTICS:   Last Comprehensive Metabolic Panel:  Sodium   Date Value Ref Range Status   07/21/2021 142 133 - 144 mmol/L Final   06/14/2021 141 133 - 144 mmol/L Final     Potassium   Date Value Ref Range Status   07/21/2021 3.6 3.4 - 5.3 mmol/L Final   06/14/2021 3.7 3.4 - 5.3 mmol/L Final     Chloride   Date Value Ref Range Status   07/21/2021 111 (H) 94 - 109 mmol/L Final   06/14/2021 106 94 - 109 mmol/L Final     Carbon Dioxide   Date Value Ref Range Status   06/14/2021 31 20 - 32 mmol/L Final     Carbon Dioxide (CO2)   Date Value Ref Range Status   07/21/2021 27 20 - 32 mmol/L Final     Anion Gap   Date Value Ref Range Status   07/21/2021 4 3 - 14 mmol/L Final   06/14/2021 4 3 - 14 mmol/L Final     Glucose   Date Value Ref Range Status   07/21/2021 87 70 - 99 mg/dL Final   06/14/2021 66 (L) 70 - 99 mg/dL Final     Urea Nitrogen   Date Value Ref Range Status   07/21/2021 20 7 - 30 mg/dL Final   06/14/2021 24 7 - 30 mg/dL Final     Creatinine   Date Value Ref Range Status   07/21/2021 0.58 0.52 - 1.04 mg/dL Final   06/14/2021 0.70 0.52 - 1.04 mg/dL Final     GFR Estimate   Date Value Ref Range Status   07/21/2021 86 >60 mL/min/1.73m2 Final     Comment:     As of July 11, 2021, eGFR is calculated by the CKD-EPI creatinine equation, without race adjustment. eGFR can be influenced by muscle mass, exercise, and diet. The reported eGFR is an estimation only and  is only applicable if the renal function is stable.   06/14/2021 80 >60 mL/min/[1.73_m2] Final     Comment:     Non  GFR Calc  Starting 12/18/2018, serum creatinine based estimated GFR (eGFR) will be   calculated using the Chronic Kidney Disease Epidemiology Collaboration   (CKD-EPI) equation.       Calcium   Date Value Ref Range Status   07/21/2021 8.4 (L) 8.5 - 10.1 mg/dL Final   06/14/2021 9.5 8.5 - 10.1 mg/dL Final       Lab Results   Component Value Date    WBC 4.9 07/21/2021    WBC 5.0 06/14/2021     Lab Results   Component Value Date    RBC 3.53 07/21/2021    RBC 3.96 06/14/2021     Lab Results   Component Value Date    HGB 11.5 07/21/2021    HGB 12.8 06/14/2021     Lab Results   Component Value Date    HCT 36.5 07/21/2021    HCT 39.3 06/14/2021     Lab Results   Component Value Date     07/21/2021    MCV 99 06/14/2021     Lab Results   Component Value Date    MCH 32.6 07/21/2021    MCH 32.3 06/14/2021     Lab Results   Component Value Date    MCHC 31.5 07/21/2021    MCHC 32.6 06/14/2021     Lab Results   Component Value Date    RDW 13.7 07/21/2021    RDW 14.2 06/14/2021     Lab Results   Component Value Date     07/21/2021     06/14/2021       ASSESSMENT/Plan:      ICD-10-CM    1. Periprosthetic fracture of right hip, subsequent encounter  M97.8XXD     Z96.649    2. History of malignant neoplasm of anus  Z85.048    3. Mild cognitive impairment  G31.84    4. Insomnia, unspecified type  G47.00    5. Atrophic vaginitis  N95.2        CHANGES:    None.    CARE PLAN:    The care plan, medications, vital signs, orders, and nursing notes have been reviewed, and all orders signed. Changes to care plan, if any, as noted. Otherwise, continue current plan of care.    The above has been created using voice recognition software. Please be aware that this may unintentionally  produce inaccuracies and/or nonsensical sentences.      Electronically signed by: GRAHAM Trejo  CNP        Sincerely,        GRAHAM Trejo CNP

## 2021-08-20 DIAGNOSIS — M25.551 RIGHT HIP PAIN: Primary | ICD-10-CM

## 2021-08-20 NOTE — PROGRESS NOTES
"Community Paramedic Program  Community Health Worker Follow Up    Intervention and Education during outreach:     Called and spoke with pt's sister Sahil. I thanked her for getting back to me and for her update. Pt is still currently at [facility], not at [facility] as it states in pt's 8/16/21 geriatrics office visit. I apologized for my confusion and thanked Sahil for confirming where pt is still currently at.     I asked how pt is doing, and Sahil said \"I went over there on Tuesday to see her.\" She said that pt \"really likes where she's at and has been doing well.\" She shared that pt has her last PT visit on Monday and \"they are going to discharge her.\" She said Elizabeth is in the process of applying for Medical Assistance (MA) and that a  at [facility] is helping them with the process and paperwork. Sahil said \"I am going over there early next week to help with some of the forms.\" Sahil said that Elizabeth \"will need to pay for her room and board\" and that she is waiting for a bed to open up in the \"long term care part of their building.\" Thanked her for the update.     Sahil also shared that Elizabeth decided to \"do a month to month lease with her apartment for now.\" She said the building management company, [employer], \"have been very understanding and told us we need to give two months' notice if and when Elizabeth decides she doesn't want to continue renting there.\" She said \"we will probably need the entire two months just to clean out her place.\" I offered my support in the future, when that time comes, with resources to help organize and clean out yung's apartment. Sahil thanked me for this and said \"that is good to know.\"     I asked if Sahil needed any other information or resources at this time, and she declined. She said \"I know how to get in touch with you if we do.\" Thanked her again for taking the time to share an update and let her know that I plan to continue calling pt " to check in. I also let Sahil know that pt has asked that I go visit her but I explained that I'm not able to, which she said she understood. Wished Sahil a happy weekend and let her know that I plan to try and call pt within the next few days to check in.    CHW Plan:  1. Pt will work with the [facility]'  to apply for Medical Assistance.  2. Pt's sister will contact the CP CHW with updates, questions or for resources.  3. CP CHW will follow up with pt in 1-2 business days by phone.

## 2021-08-23 ENCOUNTER — PATIENT OUTREACH (OUTPATIENT)
Dept: CARE COORDINATION | Facility: CLINIC | Age: 81
End: 2021-08-23

## 2021-08-23 ENCOUNTER — DISCHARGE SUMMARY NURSING HOME (OUTPATIENT)
Dept: GERIATRICS | Facility: CLINIC | Age: 81
End: 2021-08-23
Payer: MEDICARE

## 2021-08-23 VITALS
BODY MASS INDEX: 20.45 KG/M2 | OXYGEN SATURATION: 95 % | TEMPERATURE: 98.7 F | HEART RATE: 90 BPM | WEIGHT: 119.8 LBS | DIASTOLIC BLOOD PRESSURE: 68 MMHG | SYSTOLIC BLOOD PRESSURE: 120 MMHG | HEIGHT: 64 IN | RESPIRATION RATE: 16 BRPM

## 2021-08-23 DIAGNOSIS — G47.00 INSOMNIA, UNSPECIFIED TYPE: ICD-10-CM

## 2021-08-23 DIAGNOSIS — N95.2 ATROPHIC VAGINITIS: ICD-10-CM

## 2021-08-23 DIAGNOSIS — G31.84 MILD COGNITIVE IMPAIRMENT: ICD-10-CM

## 2021-08-23 DIAGNOSIS — Z96.649 PERIPROSTHETIC FRACTURE OF HIP, SUBSEQUENT ENCOUNTER: Primary | ICD-10-CM

## 2021-08-23 DIAGNOSIS — M97.8XXD PERIPROSTHETIC FRACTURE OF HIP, SUBSEQUENT ENCOUNTER: Primary | ICD-10-CM

## 2021-08-23 DIAGNOSIS — Z85.048 HISTORY OF MALIGNANT NEOPLASM OF ANUS: ICD-10-CM

## 2021-08-23 PROCEDURE — 99315 NF DSCHRG MGMT 30 MIN/LESS: CPT | Performed by: NURSE PRACTITIONER

## 2021-08-23 ASSESSMENT — MIFFLIN-ST. JEOR: SCORE: 988.41

## 2021-08-23 NOTE — LETTER
"    2021        RE: Elizabeth Taylor  [address] [address]        [facility]    Name:   Elizabeth Taylor  :   1940  MRN:    [id number]     Facility:   [facility] (SNF) [[id number]]   Room:   Code Status: FULL CODE and POLST AVAILABLE -     DOS: 2021  Previous visit: 2021    PCP:  Jann Henderson    CHIEF COMPLAINT / REASON FOR VISIT:  Chief Complaint   Patient presents with     Discharge Summary Nursing Home     Periprosthetic fracture of the right hip      [facility] from 2021 until 2021 (right periprosthetic hip fracture)  [facility] TCU from 2021 until 2021 (expected discharge date)      HPI: Elizabeth is an 81 year old female with a past medical history significant for 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  She had tried managing right hip pain as an outpatient, but it soon became unbearable.  X-rays were performed at the direction of her PCP, and they showed \"suspicion of fracture\" of the right hip.  In the ED, a CT showed a periprosthetic fracture.  Orthopedics evaluated patient and advised that she could be weightbearing as tolerated with assistant without the need for surgical intervention.  She was admitted to the ED observation unit and was not treated with scheduled acetaminophen, gabapentin, cyclobenzaprine, Lidoderm patch, and as needed oxycodone.  Her pain was well controlled with these measures.  However, she did have some confusion, and the patient was discussed with her sister, Sahil.  Sahil confirmed that the patient has baseline memory problems.  Patient and ultimately the TCU were advised to only use " "oxycodone for severe breakthrough pain, as there is concern of worsening her confusion.  Prior to discharge, the cyclobenzaprine was discontinued, and he was discharged to the TCU on the remaining medications.  She can be weightbearing as tolerated with a walker and should follow-up with Dr. Phan in 6 weeks for repeat x-rays.      CURRENT/RECENT TCU ISSUES    Disposition: She keeps praising therapy people.  According to PT, she has made significant progress.  She is now walking around the entire floor.  Pain is improved,  walking with a 2 wheeled walker.  She also uses the walker for transfers.  She earlier complained of some low back pain on the left, but that is improving. She has not asked for any pain medications and is simply given scheduled ones.  She does have some discomfort in the sacral area, mostly when up in the chair, for the last 3 or 4 days.  She continues to have regular bowel movements multiple times per day.    She has had left arm pain and had limited active ROM.  Pain is diminished with exercise, although she still has localized pain in the posterior upper arm.    Cognition: She does have a bit confused this morning.  At the initial visit, the patient admitted to some confusion, stating that she did not know where she was.  Speech tended to be rambling.  When asked the year, she answered correctly with a slight delay; however, when asked the month, she said, \"August, no, it's not August yet.  It's June.  My birthday is [birth year].  She apparently forgot that she had a birthday just 12 days before.  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  I requested OT or SLP to perform cognitive assessments which clearly indicated that assisted living or long-term care would be appropriate and that she requires 24-hour care.    Insomnia: She had been unable to sleep.  She told me that it had been \"catastrophic the first night.\"  We recently added 25 mg of trazodone at " "bedtime, later increasing that to 50 mg, and it appeared to be working.  She has describes, however, years worth of \"flexible sleeping.\"  Ultimately, she requested that the trazodone be discontinued, as she felt it made her confused and incontinent during the night.    Discharge planning: She had a care conference on 08/17.  Her last day of therapy 08/19, I believe, and that she is expected to discharge to board and care tomorrow or the following day (08/25).  She has been living alone in an apartment although she is aware she can live alone anymore.  There is a least mild cognitive impairment.  According to therapy, as noted, she requires 24-hour care.    She indicated she would like to stay here.  \"This is the best place in the world for me.  I want to get well, but I don't want to leave here,\" she says.  She will be moving into [facility] board and [facility], and she says she likes the room and the window view.      ROS: No headaches or chest pains, coughing or congestion, nausea or vomiting, dizziness or dyspnea, dysuria, difficulty chewing or swallowing, integumentary issues, or problems with appetite.  The patient and nursing staff tell me that she is not sleeping.  She stated that it's normal for her.    Past Medical History:   Diagnosis Date     Anal cancer (H)      Endometriosis, site unspecified      History of malignant neoplasm of anus 7/26/2021     Periprosthetic fracture of right hip, subsequent encounter 7/26/2021     Thyroiditis, unspecified     Thryoiditis-resolved              Family History   Problem Relation Age of Onset     Cancer Sister      Cancer Maternal Grandmother         lymphoma     Heart Disease Father         MI     Uterine Cancer Niece      Social History     Socioeconomic History     Marital status: Single     Spouse name: None     Number of children: None     Years of education: None     Highest education level: None   Occupational History     None   Tobacco Use     Smoking status: " Never Smoker     Smokeless tobacco: Never Used   Substance and Sexual Activity     Alcohol use: No     Drug use: No     Sexual activity: Yes     Partners: Male   Other Topics Concern     Parent/sibling w/ CABG, MI or angioplasty before 65F 55M? Not Asked   Social History Narrative     None     Social Determinants of Health     Financial Resource Strain: Medium Risk     Difficulty of Paying Living Expenses: Somewhat hard   Food Insecurity: No Food Insecurity     Worried About Running Out of Food in the Last Year: Never true     Ran Out of Food in the Last Year: Never true   Transportation Needs: No Transportation Needs     Lack of Transportation (Medical): No     Lack of Transportation (Non-Medical): No   Physical Activity: Insufficiently Active     Days of Exercise per Week: 5 days     Minutes of Exercise per Session: 10 min   Stress: Stress Concern Present     Feeling of Stress : To some extent   Social Connections:      Frequency of Communication with Friends and Family:      Frequency of Social Gatherings with Friends and Family:      Attends [employer] Services:      Active Member of Clubs or Organizations:      Attends Club or Organization Meetings:      Marital Status:    Intimate Partner Violence:      Fear of Current or Ex-Partner:      Emotionally Abused:      Physically Abused:      Sexually Abused:        MEDICATIONS: Reviewed from the MAR, physician orders, and/or earlier progress notes.  Post Discharge Medication Reconciliation Status: medication reconcilation previously completed during another office visit.    Current Outpatient Medications   Medication Sig     acetaminophen (TYLENOL) 500 MG tablet Take 1,000 mg by mouth 3 times daily     Calcium-Magnesium-Zinc 333-133-5 MG TABS per tablet Take 1 tablet by mouth daily     cholecalciferol (VITAMIN D3) 5000 units (125 mcg) capsule 1 CAP BY MOUTH DAILY (DX: OSTEOPOROSIS)     conjugated estrogens (PREMARIN) 0.625 MG/GM vaginal cream Apply locally to  "clitoral randall pea sized amount daily for 2 weeks then prn. (Patient not taking: Reported on 6/28/2021)     docusate sodium (COLACE) 100 MG capsule Take 1 capsule (100 mg) by mouth 2 times daily (Patient not taking: Reported on 6/28/2021)     estradiol (ESTRACE) 0.1 MG/GM vaginal cream Apply locally to clitoral randall pea sized amount daily for 2 weeks then prn. (Patient not taking: Reported on 6/28/2021)     gabapentin (NEURONTIN) 300 MG capsule Take 1 capsule (300 mg) by mouth 3 times daily     metroNIDAZOLE (METROGEL) 0.75 % external gel Apply topically 2 times daily     oxyCODONE (ROXICODONE) 5 MG tablet Take 0.5 tablets (2.5 mg) by mouth every 6 hours as needed for pain     polyethylene glycol (MIRALAX) 17 GM/Dose powder Take 17 g by mouth daily     vitamin B complex with vitamin C (VITAMIN  B COMPLEX) TABS tablet Take 1 tablet by mouth daily     VITAMIN E PO Take 1 capsule by mouth daily     Current Facility-Administered Medications   Medication     heparin 100 UNIT/ML injection 500 Units     ALLERGIES:   Allergies   Allergen Reactions     Darvon [Propoxyphene]      Had reaction in teen years     Ketoconazole Rash     Penicillins Hives     As a child     DIET: Regular, regular texture, thin liquids.    Vitals:    08/23/21 1602   BP: 120/68   Pulse: 90   Resp: 16   Temp: 98.7  F (37.1  C)   SpO2: 95%   Weight: 54.3 kg (119 lb 12.8 oz)   Height: 1.626 m (5' 4\")     Body mass index is 20.56 kg/m .    EXAMINATION:   General: Pleasant, very loquacious (and rather funny), frail-appearing elderly female, sitting in a wheelchair having lunch, in no apparent distress.    Head: Normocephalic and atraumatic.   Eyes: PERRLA, sclerae clear.  Eyes are a bit red rimmed.  ENT: Moist oral mucosa.  She has many of her own teeth.  No rhinorrhea or nasal discharge.  Hearing appears to be quite good.  Cardiovascular: Regular rate and rhythm with intermittent ectopy and no appreciable murmur.   Respiratory: Lungs clear to auscultation " bilaterally.   Abdomen: Nondistended.   Musculoskeletal/Extremities: Age-related degenerative joint disease.  Bilateral moderate to severe hallux valgus deformities and hammertoes.  Currently, somewhat mild lower extremity edema.  Port-A-Cath in the right upper chest wall.  Integument: Hypertrophic lower extremity skin from venous stasis.  Otherwise, no rashes, clinically significant lesions, or skin breakdown.   Cognitive/Psychiatric: Clearly trying to have some cognitive deficits, though affect is quite upbeat.    DIAGNOSTICS:   Last Comprehensive Metabolic Panel:  Sodium   Date Value Ref Range Status   07/21/2021 142 133 - 144 mmol/L Final   06/14/2021 141 133 - 144 mmol/L Final     Potassium   Date Value Ref Range Status   07/21/2021 3.6 3.4 - 5.3 mmol/L Final   06/14/2021 3.7 3.4 - 5.3 mmol/L Final     Chloride   Date Value Ref Range Status   07/21/2021 111 (H) 94 - 109 mmol/L Final   06/14/2021 106 94 - 109 mmol/L Final     Carbon Dioxide   Date Value Ref Range Status   06/14/2021 31 20 - 32 mmol/L Final     Carbon Dioxide (CO2)   Date Value Ref Range Status   07/21/2021 27 20 - 32 mmol/L Final     Anion Gap   Date Value Ref Range Status   07/21/2021 4 3 - 14 mmol/L Final   06/14/2021 4 3 - 14 mmol/L Final     Glucose   Date Value Ref Range Status   07/21/2021 87 70 - 99 mg/dL Final   06/14/2021 66 (L) 70 - 99 mg/dL Final     Urea Nitrogen   Date Value Ref Range Status   07/21/2021 20 7 - 30 mg/dL Final   06/14/2021 24 7 - 30 mg/dL Final     Creatinine   Date Value Ref Range Status   07/21/2021 0.58 0.52 - 1.04 mg/dL Final   06/14/2021 0.70 0.52 - 1.04 mg/dL Final     GFR Estimate   Date Value Ref Range Status   07/21/2021 86 >60 mL/min/1.73m2 Final     Comment:     As of July 11, 2021, eGFR is calculated by the CKD-EPI creatinine equation, without race adjustment. eGFR can be influenced by muscle mass, exercise, and diet. The reported eGFR is an estimation only and is only applicable if the renal function is  stable.   06/14/2021 80 >60 mL/min/[1.73_m2] Final     Comment:     Non  GFR Calc  Starting 12/18/2018, serum creatinine based estimated GFR (eGFR) will be   calculated using the Chronic Kidney Disease Epidemiology Collaboration   (CKD-EPI) equation.       Calcium   Date Value Ref Range Status   07/21/2021 8.4 (L) 8.5 - 10.1 mg/dL Final   06/14/2021 9.5 8.5 - 10.1 mg/dL Final       Lab Results   Component Value Date    WBC 4.9 07/21/2021    WBC 5.0 06/14/2021     Lab Results   Component Value Date    RBC 3.53 07/21/2021    RBC 3.96 06/14/2021     Lab Results   Component Value Date    HGB 11.5 07/21/2021    HGB 12.8 06/14/2021     Lab Results   Component Value Date    HCT 36.5 07/21/2021    HCT 39.3 06/14/2021     Lab Results   Component Value Date     07/21/2021    MCV 99 06/14/2021     Lab Results   Component Value Date    MCH 32.6 07/21/2021    MCH 32.3 06/14/2021     Lab Results   Component Value Date    MCHC 31.5 07/21/2021    MCHC 32.6 06/14/2021     Lab Results   Component Value Date    RDW 13.7 07/21/2021    RDW 14.2 06/14/2021     Lab Results   Component Value Date     07/21/2021     06/14/2021       ASSESSMENT/Plan:      ICD-10-CM    1. Periprosthetic fracture of right hip, subsequent encounter  M97.8XXD     Z96.649    2. History of malignant neoplasm of anus  Z85.048    3. Mild cognitive impairment  G31.84    4. Insomnia, unspecified type  G47.00    5. Atrophic vaginitis  N95.2        The patient is, or has been, under my care and I have initiated the establishment of the plan of care. This patient will be followed by a primary care provider who will periodically review the plan of care.  Initial follow-up should be within 7-10 days.      The above has been created using voice recognition software. Please be aware that this may unintentionally  produce inaccuracies and/or nonsensical sentences.      Electronically signed by: GRAHAM Trejo  CNP        Sincerely,        GRAHAM Treoj CNP

## 2021-08-23 NOTE — PROGRESS NOTES
Care Coordination Clinician Chart Review  Situation: Patient chart reviewed by care coordinator.       Background: Care Coordination initial assessment and enrollment to Care Coordination was successful.   Patient centered goals were developed with participation from patient.   CC handed patient off to CHW for continued outreach every 30 days.        Assessment: Per chart review, patient outreach completed by CC CHW on 8/19/21.  Patient is actively working to accomplish goals.  Patient's goals remain appropriate and relevant at this time.   Patient is not yet due for updated Plan of Care.  Annual assessment will be due 5/28/21       Goals        Medical (pt-stated)       Goal Statement: I want to complete a healthcare directive in the next two months.  Date Goal set: 7/8/21  Barriers: none  Strengths: family support, CC & CP support, cancer free  Date to Achieve By: 9/8/21  Patient expressed understanding of goal: yes    Action steps to achieve this goal:  1. I will ask the CP CHW to bring information to guide a conversation about my wishes and preferences to our next visit.  2. I will see if I can find the healthcare directive I started when I was at the [facility] in 2018.  3. I will continue speaking with my family and friends about this topic.           Reducing Risks (pt-stated)       Goal Statement: In the next three months, I want to connect with resources and supports to help keep me safe at home.  Date Goal set: 6/11/21  Barriers: finances  Strengths: support system, CP and CC support, open to making changes  Date to Achieve By: 9/11/21  Patient expressed understanding of goal: yes    Action steps to achieve this goal:  1. I will contact Jessica, a RN from the [facility], at [phone] to schedule another in-home foot care visit.  2. I will ask my sister Sahil and her  Hugo to help me understand what I can pay for services at home. (in process)  3. With my sister and  brother-in-law's help, I will review fall alert system options that the CP CHW shared, choose the best fit for me and purchase one this week. (in process)  4. I will let the CP CHW know when I would like to sign up for [employer]'s phone  program.    Updated: 7/19/21                Plan/Recommendations: The patient will continue working with Care Coordination to achieve goals as above.  CHW will involve SW CC as needed or if patient is ready to move to maintenance.  SW CC will continue to monitor progress to goals and CHW outreaches every 6 weeks.   Plan of Care updated and mailed to patient: QUYNH Edmondson   [facility] Care Coordination  Tel: [phone]

## 2021-08-24 ENCOUNTER — ANCILLARY PROCEDURE (OUTPATIENT)
Dept: GENERAL RADIOLOGY | Facility: CLINIC | Age: 81
End: 2021-08-24
Attending: ORTHOPAEDIC SURGERY
Payer: MEDICARE

## 2021-08-24 ENCOUNTER — OFFICE VISIT (OUTPATIENT)
Dept: ORTHOPEDICS | Facility: CLINIC | Age: 81
End: 2021-08-24
Payer: MEDICARE

## 2021-08-24 ENCOUNTER — PATIENT OUTREACH (OUTPATIENT)
Dept: OTHER | Facility: CLINIC | Age: 81
End: 2021-08-24

## 2021-08-24 VITALS — BODY MASS INDEX: 18.95 KG/M2 | HEIGHT: 64 IN | WEIGHT: 111 LBS

## 2021-08-24 DIAGNOSIS — M25.551 HIP PAIN, RIGHT: ICD-10-CM

## 2021-08-24 PROCEDURE — 73502 X-RAY EXAM HIP UNI 2-3 VIEWS: CPT | Mod: RT | Performed by: RADIOLOGY

## 2021-08-24 PROCEDURE — 99213 OFFICE O/P EST LOW 20 MIN: CPT | Performed by: ORTHOPAEDIC SURGERY

## 2021-08-24 ASSESSMENT — MIFFLIN-ST. JEOR: SCORE: 948.49

## 2021-08-24 NOTE — LETTER
8/24/2021         RE: Elizabeth Taylor  [address] [facility] [address]        Dear Colleague,    Thank you for referring your patient, Elizabeth Taylor, to the [facility] [place]. Please see a copy of my visit note below.    Spine Surgery Return Clinic Visit      Chief Complaint:   RECHECK (6 week follow up Right hip fracture ED visit in july has been doing well with pain and has some questions about her hip and knee alignment. )      Interval HPI:  Symptom Profile Including: location of symptoms, onset, severity, exacerbating/alleviating factors, previous treatments:        Elizabeth Taylor is a 81 year old female who returns today in follow-up.  She was seen in the emergency department after a fall onto her right hip in July which had caused a nondisplaced greater trochanter fracture.  She says over the last few days she is really made quite a lot of progress.  She is not really feeling much pain at this time.  She is eager to advance her activities.            Past Medical History:     Past Medical History:   Diagnosis Date     Anal cancer (H)      Endometriosis, site unspecified      History of malignant neoplasm of anus 7/26/2021     Periprosthetic fracture of right hip, subsequent encounter 7/26/2021     Thyroiditis, unspecified     Thryoiditis-resolved            Past Surgical History:     Past Surgical History:   Procedure Laterality Date     APPENDECTOMY      as a child     ARTHROPLASTY HIP Right 7/26/2018    Procedure: ARTHROPLASTY HIP;  Right Hip Hemiarthroplasty;  Surgeon: Zaire Phan MD;  Location: [facility] OR     COLONOSCOPY N/A 4/13/2017    Procedure: COLONOSCOPY;  Surgeon: Juan Dos Santos MD;  Location: [facility] GI     INSERT PORT VASCULAR ACCESS Right 2/8/2018    Procedure: INSERT PORT VASCULAR ACCESS;  Place Single Lumen Venous Chest Port Placement;  Surgeon: Zaire Moreira PA-C;  Location:  OR     SURGICAL  "HISTORY OF -       endometriosis            Social History:     Social History     Tobacco Use     Smoking status: Never Smoker     Smokeless tobacco: Never Used   Substance Use Topics     Alcohol use: No            Family History:     Family History   Problem Relation Age of Onset     Cancer Sister      Cancer Maternal Grandmother         lymphoma     Heart Disease Father         MI     Uterine Cancer Niece             Allergies:     Allergies   Allergen Reactions     Darvon [Propoxyphene]      Had reaction in teen years     Ketoconazole Rash     Penicillins Hives     As a child            Medications:     Current Outpatient Medications   Medication     acetaminophen (TYLENOL) 500 MG tablet     Calcium-Magnesium-Zinc 333-133-5 MG TABS per tablet     cholecalciferol (VITAMIN D3) 5000 units (125 mcg) capsule     conjugated estrogens (PREMARIN) 0.625 MG/GM vaginal cream     docusate sodium (COLACE) 100 MG capsule     estradiol (ESTRACE) 0.1 MG/GM vaginal cream     gabapentin (NEURONTIN) 300 MG capsule     Lidocaine (LIDOCARE) 4 % Patch     metroNIDAZOLE (METROGEL) 0.75 % external gel     oxyCODONE (ROXICODONE) 5 MG tablet     polyethylene glycol (MIRALAX) 17 GM/Dose powder     vitamin B complex with vitamin C (VITAMIN  B COMPLEX) TABS tablet     VITAMIN E PO     Current Facility-Administered Medications   Medication     heparin 100 UNIT/ML injection 500 Units             Review of Systems:   A focused musculoskeletal and neurologic ROS was performed with pertinent positives and negatives noted in the HPI.  Additional systems were also reviewed and are documented at the bottom of the note.         Physical Exam:   Vitals: Ht 1.626 m (5' 4\")   Wt 50.3 kg (111 lb)   LMP  (LMP Unknown)   BMI 19.05 kg/m    Musculoskeletal, Neurologic, and Spine:     No pain with hip logroll.  She is seated in a wheelchair.  5-5 ankle dorsi flexion plantar flexion and sensation intact L3-S1         Imaging:   We ordered and independently " reviewed new radiographs at this clinic visit. The results were discussed with the patient. Findings include:    Radiographs today show continued fracture line through the right greater trochanter.  No obvious displacement.       Assessment and Plan:     81 year old female with nondisplaced right greater trochanteric fracture with significant improvement in pain in the last week or 2.    I think she can advance to activities as tolerated.  I offered her a referral to physical therapy, but she declined this.  If she does wish to pursue physical therapy she will let me know and I am happy to order this over the phone.  She can let me know if any pain returns or she has difficulty ambulating, otherwise follow-up as needed.           Respectfully,  Zaire Phan MD  Spine Surgery  [facility]

## 2021-08-24 NOTE — PROGRESS NOTES
Spine Surgery Return Clinic Visit      Chief Complaint:   RECHECK (6 week follow up Right hip fracture ED visit in july has been doing well with pain and has some questions about her hip and knee alignment. )      Interval HPI:  Symptom Profile Including: location of symptoms, onset, severity, exacerbating/alleviating factors, previous treatments:        Elizabeth Taylor is a 81 year old female who returns today in follow-up.  She was seen in the emergency department after a fall onto her right hip in July which had caused a nondisplaced greater trochanter fracture.  She says over the last few days she is really made quite a lot of progress.  She is not really feeling much pain at this time.  She is eager to advance her activities.            Past Medical History:     Past Medical History:   Diagnosis Date     Anal cancer (H)      Endometriosis, site unspecified      History of malignant neoplasm of anus 7/26/2021     Periprosthetic fracture of right hip, subsequent encounter 7/26/2021     Thyroiditis, unspecified     Thryoiditis-resolved            Past Surgical History:     Past Surgical History:   Procedure Laterality Date     APPENDECTOMY      as a child     ARTHROPLASTY HIP Right 7/26/2018    Procedure: ARTHROPLASTY HIP;  Right Hip Hemiarthroplasty;  Surgeon: Zaire Phan MD;  Location: [facility] OR     COLONOSCOPY N/A 4/13/2017    Procedure: COLONOSCOPY;  Surgeon: Juan Dos Santos MD;  Location: [facility] GI     INSERT PORT VASCULAR ACCESS Right 2/8/2018    Procedure: INSERT PORT VASCULAR ACCESS;  Place Single Lumen Venous Chest Port Placement;  Surgeon: Zaire Moreira PA-C;  Location: [facility] OR     SURGICAL HISTORY OF -       endometriosis            Social History:     Social History     Tobacco Use     Smoking status: Never Smoker     Smokeless tobacco: Never Used   Substance Use Topics     Alcohol use: No            Family History:     Family History   Problem Relation Age of Onset      "Cancer Sister      Cancer Maternal Grandmother         lymphoma     Heart Disease Father         MI     Uterine Cancer Niece             Allergies:     Allergies   Allergen Reactions     Darvon [Propoxyphene]      Had reaction in teen years     Ketoconazole Rash     Penicillins Hives     As a child            Medications:     Current Outpatient Medications   Medication     acetaminophen (TYLENOL) 500 MG tablet     Calcium-Magnesium-Zinc 333-133-5 MG TABS per tablet     cholecalciferol (VITAMIN D3) 5000 units (125 mcg) capsule     conjugated estrogens (PREMARIN) 0.625 MG/GM vaginal cream     docusate sodium (COLACE) 100 MG capsule     estradiol (ESTRACE) 0.1 MG/GM vaginal cream     gabapentin (NEURONTIN) 300 MG capsule     Lidocaine (LIDOCARE) 4 % Patch     metroNIDAZOLE (METROGEL) 0.75 % external gel     oxyCODONE (ROXICODONE) 5 MG tablet     polyethylene glycol (MIRALAX) 17 GM/Dose powder     vitamin B complex with vitamin C (VITAMIN  B COMPLEX) TABS tablet     VITAMIN E PO     Current Facility-Administered Medications   Medication     heparin 100 UNIT/ML injection 500 Units             Review of Systems:   A focused musculoskeletal and neurologic ROS was performed with pertinent positives and negatives noted in the HPI.  Additional systems were also reviewed and are documented at the bottom of the note.         Physical Exam:   Vitals: Ht 1.626 m (5' 4\")   Wt 50.3 kg (111 lb)   LMP  (LMP Unknown)   BMI 19.05 kg/m    Musculoskeletal, Neurologic, and Spine:     No pain with hip logroll.  She is seated in a wheelchair.  5-5 ankle dorsi flexion plantar flexion and sensation intact L3-S1         Imaging:   We ordered and independently reviewed new radiographs at this clinic visit. The results were discussed with the patient. Findings include:    Radiographs today show continued fracture line through the right greater trochanter.  No obvious displacement.       Assessment and Plan:     81 year old female with " nondisplaced right greater trochanteric fracture with significant improvement in pain in the last week or 2.    I think she can advance to activities as tolerated.  I offered her a referral to physical therapy, but she declined this.  If she does wish to pursue physical therapy she will let me know and I am happy to order this over the phone.  She can let me know if any pain returns or she has difficulty ambulating, otherwise follow-up as needed.           Respectfully,  Zaire Phan MD  Spine Surgery  [facility]

## 2021-08-24 NOTE — PROGRESS NOTES
"Community [facility]amedic Program  Community Health Worker Follow Up    Intervention and Education during outreach:     Pt called and left a message to let me know that she is moving into a room in the long-term care floor of Health system tomorrow, August 24th. She said \"my sister Sahil told me that the phone will be disconnected and that we'll be busy moving all of my things for the next few days.\" She said \"I am hoping on Friday that I can talk with you so I can tell you all about it.\" She also asked again if I could come visit and \"celebrate because this is the first day of the rest of my life.\" Pt expressed her appreciation and excitement about moving. She said \"I'll try to call you again on Friday\" before hanging up.    CHW Plan:  1. Pt will move into her new room at Health system this week.  2. CP CHW will call pt in 3-5 business days to follow up.            "

## 2021-08-24 NOTE — NURSING NOTE
"Reason For Visit:   Chief Complaint   Patient presents with     RECHECK     6 week follow up Right hip fracture ED visit in july has been doing well with pain and has some questions about her hip and knee alignment.        Ht 1.626 m (5' 4\")   Wt 50.3 kg (111 lb)   LMP  (LMP Unknown)   BMI 19.05 kg/m           Doron Paz ATC  "

## 2021-08-25 ENCOUNTER — TELEPHONE (OUTPATIENT)
Dept: GERIATRICS | Facility: CLINIC | Age: 81
End: 2021-08-25

## 2021-08-25 NOTE — TELEPHONE ENCOUNTER
[facility] Nurse Triage Telephone Note    Provider: OLIVIA Helms  [facility]  Facility Type:  C    Caller: Eunice  Call Back Number: [phone]    Allergies:    Allergies   Allergen Reactions     Darvon [Propoxyphene]      Had reaction in teen years     Ketoconazole Rash     Penicillins Hives     As a child        Reason for call: Nurse reporting that patient has been refusing her Lidoccaine 4% patch since admission to TCU.      Verbal Order/Direction given by Provider: Discontinue Lidocaine 4% patch due to patient refusal.      Provider giving Order:  OLIVIA Helms    Verbal Order given to: Eunice Zamora RN

## 2021-08-26 NOTE — PROGRESS NOTES
"[facility]    Name:   Elizabeth Taylor  :   1940  MRN:    [id number]     Facility:   [facility] ([facility]) [[id number]]   Room:   Code Status: FULL CODE and POLST AVAILABLE -     DOS: 2021  Previous visit: 2021    PCP:  Jann Henderson    CHIEF COMPLAINT / REASON FOR VISIT:  Chief Complaint   Patient presents with     Clinic Care Coordination - Follow-up       Periprosthetic fracture of the right hip      [facility] from 2021 until 2021 (right periprosthetic hip fracture)  [facility]CU from 2021 until 2021 (approximate discharge date)      HPI: Elizabeth is an 81 year old female with a past medical history significant for 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  She had tried managing right hip pain as an outpatient, but it soon became unbearable.  X-rays were performed at the direction of her PCP, and they showed \"suspicion of fracture\" of the right hip.  In the ED, a CT showed a periprosthetic fracture.  Orthopedics evaluated patient and advised that she could be weightbearing as tolerated with assistant without the need for surgical intervention.  She was admitted to the ED observation unit and was not treated with scheduled acetaminophen, gabapentin, cyclobenzaprine, Lidoderm patch, and as needed oxycodone.  Her pain was well controlled with these measures.  However, she did have some confusion, and the patient was discussed with her sister, Sahil.  Sahil confirmed that the patient has baseline memory problems.  Patient and ultimately the TCU were advised to only use oxycodone for severe breakthrough pain, as there is concern of worsening her confusion.  " "Prior to discharge, the cyclobenzaprine was discontinued, and he was discharged to the TCU on the remaining medications.  She can be weightbearing as tolerated with a walker and should follow-up with Dr. Phan in 6 weeks for repeat x-rays.      CURRENT/RECENT TCU ISSUES    Disposition: She keeps praising therapy people.  According to PT, she has made significant progress.  She is now walking around the entire floor.  Pain is improved,  walking with a 2 wheeled walker.  She also uses the walker for transfers.  She earlier complained of some low back pain on the left, but that is improving. She has not asked for any pain medications and is simply given scheduled ones.  She does have some discomfort in the sacral area, mostly when up in the chair, for the last 3 or 4 days.  She continues to have regular bowel movements multiple times per day.    She has had left arm pain and had limited active ROM.  Pain is diminished with exercise, although she still has localized pain in the posterior upper arm.    Cognition: She does have a bit confused this morning.  At the initial visit, the patient admitted to some confusion, stating that she did not know where she was.  Speech tended to be rambling.  When asked the year, she answered correctly with a slight delay; however, when asked the month, she said, \"August, no, it's not August yet.  It's June.  My birthday is [birth year].  She apparently forgot that she had a birthday just 12 days before.  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  I requested OT or SLP to perform cognitive assessments which clearly indicated that assisted living or long-term care would be appropriate and that she requires 24-hour care.    Insomnia: She had been unable to sleep.  She told me that it had been \"catastrophic the first night.\"  We recently added 25 mg of trazodone at bedtime, later increasing that to 50 mg, and it appeared to be working.  She has describes, " "however, years worth of \"flexible sleeping.\"  Ultimately, she requested that the trazodone be discontinued, as she felt it made her confused and incontinent during the night.    Discharge planning: She had a care conference on 08/17.  Her last day of therapy is today, I believe, and that she is expected to discharge to board and care later this week.  She has been living alone in an apartment although she is aware she can live alone anymore.  There is a least mild cognitive impairment.  According to therapy, as noted, she requires 24-hour care.    Apparently, she would like to stay here.  \"This is the best place in the world for me.  I want to get well, but I don't want to leave here,\" she says.  She may be aware that she cannot really go home.      ROS: No headaches or chest pains, coughing or congestion, nausea or vomiting, dizziness or dyspnea, dysuria, difficulty chewing or swallowing, integumentary issues, or problems with appetite.  The patient and nursing staff tell me that she is not sleeping.  She stated that it's normal for her.    Past Medical History:   Diagnosis Date     Anal cancer (H)      Endometriosis, site unspecified      History of malignant neoplasm of anus 7/26/2021     Periprosthetic fracture of right hip, subsequent encounter 7/26/2021     Thyroiditis, unspecified     Thryoiditis-resolved              Family History   Problem Relation Age of Onset     Cancer Sister      Cancer Maternal Grandmother         lymphoma     Heart Disease Father         MI     Uterine Cancer Niece      Social History     Socioeconomic History     Marital status: Single     Spouse name: None     Number of children: None     Years of education: None     Highest education level: None   Occupational History     None   Tobacco Use     Smoking status: Never Smoker     Smokeless tobacco: Never Used   Substance and Sexual Activity     Alcohol use: No     Drug use: No     Sexual activity: Yes     Partners: Male   Other " Topics Concern     Parent/sibling w/ CABG, MI or angioplasty before 65F 55M? Not Asked   Social History Narrative     None     Social Determinants of Health     Financial Resource Strain: Medium Risk     Difficulty of Paying Living Expenses: Somewhat hard   Food Insecurity: No Food Insecurity     Worried About Running Out of Food in the Last Year: Never true     Ran Out of Food in the Last Year: Never true   Transportation Needs: No Transportation Needs     Lack of Transportation (Medical): No     Lack of Transportation (Non-Medical): No   Physical Activity: Insufficiently Active     Days of Exercise per Week: 5 days     Minutes of Exercise per Session: 10 min   Stress: Stress Concern Present     Feeling of Stress : To some extent   Social Connections:      Frequency of Communication with Friends and Family:      Frequency of Social Gatherings with Friends and Family:      Attends [employer] Services:      Active Member of Clubs or Organizations:      Attends Club or Organization Meetings:      Marital Status:    Intimate Partner Violence:      Fear of Current or Ex-Partner:      Emotionally Abused:      Physically Abused:      Sexually Abused:        MEDICATIONS: Reviewed from the MAR, physician orders, and/or earlier progress notes.  Post Discharge Medication Reconciliation Status: medication reconcilation previously completed during another office visit.    Current Outpatient Medications   Medication Sig     acetaminophen (TYLENOL) 500 MG tablet Take 1,000 mg by mouth 3 times daily     Calcium-Magnesium-Zinc 333-133-5 MG TABS per tablet Take 1 tablet by mouth daily     cholecalciferol (VITAMIN D3) 5000 units (125 mcg) capsule 1 CAP BY MOUTH DAILY (DX: OSTEOPOROSIS)     conjugated estrogens (PREMARIN) 0.625 MG/GM vaginal cream Apply locally to clitoral randall pea sized amount daily for 2 weeks then prn. (Patient not taking: Reported on 6/28/2021)     docusate sodium (COLACE) 100 MG capsule Take 1 capsule (100 mg) by  mouth 2 times daily (Patient not taking: Reported on 6/28/2021)     estradiol (ESTRACE) 0.1 MG/GM vaginal cream Apply locally to clitoral randall pea sized amount daily for 2 weeks then prn. (Patient not taking: Reported on 6/28/2021)     gabapentin (NEURONTIN) 300 MG capsule Take 1 capsule (300 mg) by mouth 3 times daily     metroNIDAZOLE (METROGEL) 0.75 % external gel Apply topically 2 times daily     oxyCODONE (ROXICODONE) 5 MG tablet Take 0.5 tablets (2.5 mg) by mouth every 6 hours as needed for pain     polyethylene glycol (MIRALAX) 17 GM/Dose powder Take 17 g by mouth daily     vitamin B complex with vitamin C (VITAMIN  B COMPLEX) TABS tablet Take 1 tablet by mouth daily     VITAMIN E PO Take 1 capsule by mouth daily     Current Facility-Administered Medications   Medication     heparin 100 UNIT/ML injection 500 Units     ALLERGIES:   Allergies   Allergen Reactions     Darvon [Propoxyphene]      Had reaction in teen years     Ketoconazole Rash     Penicillins Hives     As a child     DIET: Regular, regular texture, thin liquids.    Vitals:    08/19/21 1414   BP: 132/72   Pulse: 67   Resp: 16   Temp: 98.5  F (36.9  C)   SpO2: 96%   Weight: 50.4 kg (111 lb 3.2 oz)     Body mass index is 19.09 kg/m .    EXAMINATION:   General: Pleasant, very loquacious (and rather funny), frail-appearing elderly female, in no apparent distress.  She is up to the bathroom on her own using a wheelchair.  She is able to turn her wheelchair around in the bathroom with a good deal of effort, utilizing her feet.  Head: Normocephalic and atraumatic.   Eyes: PERRLA, sclerae clear.  Eyes are a bit red rimmed.  ENT: Moist oral mucosa.  She has many of her own teeth.  No rhinorrhea or nasal discharge.  Hearing appears to be quite good.  Cardiovascular: Regular rate and rhythm with intermittent ectopy and no appreciable murmur.   Respiratory: Lungs clear to auscultation bilaterally.   Abdomen: Nondistended.   Musculoskeletal/Extremities:  Age-related degenerative joint disease.  Bilateral moderate to severe hallux valgus deformities and hammertoes.  Currently, somewhat mild lower extremity edema.  Port-A-Cath in the right upper chest wall.  Integument: Hypertrophic lower extremity skin from venous stasis.  Otherwise, no rashes, clinically significant lesions, or skin breakdown.   Cognitive/Psychiatric: Clearly trying to have some cognitive deficits, though affect is quite upbeat.    DIAGNOSTICS:   Last Comprehensive Metabolic Panel:  Sodium   Date Value Ref Range Status   07/21/2021 142 133 - 144 mmol/L Final   06/14/2021 141 133 - 144 mmol/L Final     Potassium   Date Value Ref Range Status   07/21/2021 3.6 3.4 - 5.3 mmol/L Final   06/14/2021 3.7 3.4 - 5.3 mmol/L Final     Chloride   Date Value Ref Range Status   07/21/2021 111 (H) 94 - 109 mmol/L Final   06/14/2021 106 94 - 109 mmol/L Final     Carbon Dioxide   Date Value Ref Range Status   06/14/2021 31 20 - 32 mmol/L Final     Carbon Dioxide (CO2)   Date Value Ref Range Status   07/21/2021 27 20 - 32 mmol/L Final     Anion Gap   Date Value Ref Range Status   07/21/2021 4 3 - 14 mmol/L Final   06/14/2021 4 3 - 14 mmol/L Final     Glucose   Date Value Ref Range Status   07/21/2021 87 70 - 99 mg/dL Final   06/14/2021 66 (L) 70 - 99 mg/dL Final     Urea Nitrogen   Date Value Ref Range Status   07/21/2021 20 7 - 30 mg/dL Final   06/14/2021 24 7 - 30 mg/dL Final     Creatinine   Date Value Ref Range Status   07/21/2021 0.58 0.52 - 1.04 mg/dL Final   06/14/2021 0.70 0.52 - 1.04 mg/dL Final     GFR Estimate   Date Value Ref Range Status   07/21/2021 86 >60 mL/min/1.73m2 Final     Comment:     As of July 11, 2021, eGFR is calculated by the CKD-EPI creatinine equation, without race adjustment. eGFR can be influenced by muscle mass, exercise, and diet. The reported eGFR is an estimation only and is only applicable if the renal function is stable.   06/14/2021 80 >60 mL/min/[1.73_m2] Final     Comment:      Non  GFR Calc  Starting 12/18/2018, serum creatinine based estimated GFR (eGFR) will be   calculated using the Chronic Kidney Disease Epidemiology Collaboration   (CKD-EPI) equation.       Calcium   Date Value Ref Range Status   07/21/2021 8.4 (L) 8.5 - 10.1 mg/dL Final   06/14/2021 9.5 8.5 - 10.1 mg/dL Final       Lab Results   Component Value Date    WBC 4.9 07/21/2021    WBC 5.0 06/14/2021     Lab Results   Component Value Date    RBC 3.53 07/21/2021    RBC 3.96 06/14/2021     Lab Results   Component Value Date    HGB 11.5 07/21/2021    HGB 12.8 06/14/2021     Lab Results   Component Value Date    HCT 36.5 07/21/2021    HCT 39.3 06/14/2021     Lab Results   Component Value Date     07/21/2021    MCV 99 06/14/2021     Lab Results   Component Value Date    MCH 32.6 07/21/2021    MCH 32.3 06/14/2021     Lab Results   Component Value Date    MCHC 31.5 07/21/2021    MCHC 32.6 06/14/2021     Lab Results   Component Value Date    RDW 13.7 07/21/2021    RDW 14.2 06/14/2021     Lab Results   Component Value Date     07/21/2021     06/14/2021       ASSESSMENT/Plan:      ICD-10-CM    1. Periprosthetic fracture of right hip, subsequent encounter  M97.8XXD     Z96.649    2. History of malignant neoplasm of anus  Z85.048    3. Mild cognitive impairment  G31.84    4. Insomnia, unspecified type  G47.00    5. Atrophic vaginitis  N95.2        CHANGES:    None.    CARE PLAN:    The care plan, medications, vital signs, orders, and nursing notes have been reviewed, and all orders signed. Changes to care plan, if any, as noted. Otherwise, continue current plan of care.    The above has been created using voice recognition software. Please be aware that this may unintentionally  produce inaccuracies and/or nonsensical sentences.      Electronically signed by: GRAHAM Trejo CNP

## 2021-08-26 NOTE — PROGRESS NOTES
"[facility]    Name:   Elizabeth Taylor  :   1940  MRN:    [id number]     Facility:   [facility] (SNF) [[id number]]   Room:   Code Status: FULL CODE and POLST AVAILABLE -     DOS: 2021  Previous visit: 2021    PCP:  Jann Henderson    CHIEF COMPLAINT / REASON FOR VISIT:  Chief Complaint   Patient presents with     Discharge Summary Nursing Home     Periprosthetic fracture of the right hip      [facility] from 2021 until 2021 (right periprosthetic hip fracture)  [facility]CU from 2021 until 2021 (expected discharge date)      HPI: Elizabeth is an 81 year old female with a past medical history significant for 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  She had tried managing right hip pain as an outpatient, but it soon became unbearable.  X-rays were performed at the direction of her PCP, and they showed \"suspicion of fracture\" of the right hip.  In the ED, a CT showed a periprosthetic fracture.  Orthopedics evaluated patient and advised that she could be weightbearing as tolerated with assistant without the need for surgical intervention.  She was admitted to the ED observation unit and was not treated with scheduled acetaminophen, gabapentin, cyclobenzaprine, Lidoderm patch, and as needed oxycodone.  Her pain was well controlled with these measures.  However, she did have some confusion, and the patient was discussed with her sister, Sahil.  Sahil confirmed that the patient has baseline memory problems.  Patient and ultimately the TCU were advised to only use oxycodone for severe breakthrough pain, as there is concern of worsening her confusion.  Prior to " "discharge, the cyclobenzaprine was discontinued, and he was discharged to the TCU on the remaining medications.  She can be weightbearing as tolerated with a walker and should follow-up with Dr. Phan in 6 weeks for repeat x-rays.      CURRENT/RECENT TCU ISSUES    Disposition: She keeps praising therapy people.  According to PT, she has made significant progress.  She is now walking around the entire floor.  Pain is improved,  walking with a 2 wheeled walker.  She also uses the walker for transfers.  She earlier complained of some low back pain on the left, but that is improving. She has not asked for any pain medications and is simply given scheduled ones.  She does have some discomfort in the sacral area, mostly when up in the chair, for the last 3 or 4 days.  She continues to have regular bowel movements multiple times per day.    She has had left arm pain and had limited active ROM.  Pain is diminished with exercise, although she still has localized pain in the posterior upper arm.    Cognition: She does have a bit confused this morning.  At the initial visit, the patient admitted to some confusion, stating that she did not know where she was.  Speech tended to be rambling.  When asked the year, she answered correctly with a slight delay; however, when asked the month, she said, \"August, no, it's not August yet.  It's June.  My birthday is [birth year].  She apparently forgot that she had a birthday just 12 days before.  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  I requested OT or SLP to perform cognitive assessments which clearly indicated that assisted living or long-term care would be appropriate and that she requires 24-hour care.    Insomnia: She had been unable to sleep.  She told me that it had been \"catastrophic the first night.\"  We recently added 25 mg of trazodone at bedtime, later increasing that to 50 mg, and it appeared to be working.  She has describes, however, " "years worth of \"flexible sleeping.\"  Ultimately, she requested that the trazodone be discontinued, as she felt it made her confused and incontinent during the night.    Discharge planning: She had a care conference on 08/17.  Her last day of therapy 08/19, I believe, and that she is expected to discharge to board and care tomorrow or the following day (08/25).  She has been living alone in an apartment although she is aware she can live alone anymore.  There is a least mild cognitive impairment.  According to therapy, as noted, she requires 24-hour care.    She indicated she would like to stay here.  \"This is the best place in the world for me.  I want to get well, but I don't want to leave here,\" she says.  She will be moving into [facility], and she says she likes the room and the window view.      ROS: No headaches or chest pains, coughing or congestion, nausea or vomiting, dizziness or dyspnea, dysuria, difficulty chewing or swallowing, integumentary issues, or problems with appetite.  The patient and nursing staff tell me that she is not sleeping.  She stated that it's normal for her.    Past Medical History:   Diagnosis Date     Anal cancer (H)      Endometriosis, site unspecified      History of malignant neoplasm of anus 7/26/2021     Periprosthetic fracture of right hip, subsequent encounter 7/26/2021     Thyroiditis, unspecified     Thryoiditis-resolved              Family History   Problem Relation Age of Onset     Cancer Sister      Cancer Maternal Grandmother         lymphoma     Heart Disease Father         MI     Uterine Cancer Niece      Social History     Socioeconomic History     Marital status: Single     Spouse name: None     Number of children: None     Years of education: None     Highest education level: None   Occupational History     None   Tobacco Use     Smoking status: Never Smoker     Smokeless tobacco: Never Used   Substance and Sexual Activity     Alcohol use: No     " Drug use: No     Sexual activity: Yes     Partners: Male   Other Topics Concern     Parent/sibling w/ CABG, MI or angioplasty before 65F 55M? Not Asked   Social History Narrative     None     Social Determinants of Health     Financial Resource Strain: Medium Risk     Difficulty of Paying Living Expenses: Somewhat hard   Food Insecurity: No Food Insecurity     Worried About Running Out of Food in the Last Year: Never true     Ran Out of Food in the Last Year: Never true   Transportation Needs: No Transportation Needs     Lack of Transportation (Medical): No     Lack of Transportation (Non-Medical): No   Physical Activity: Insufficiently Active     Days of Exercise per Week: 5 days     Minutes of Exercise per Session: 10 min   Stress: Stress Concern Present     Feeling of Stress : To some extent   Social Connections:      Frequency of Communication with Friends and Family:      Frequency of Social Gatherings with Friends and Family:      Attends Bahai Services:      Active Member of Clubs or Organizations:      Attends Club or Organization Meetings:      Marital Status:    Intimate Partner Violence:      Fear of Current or Ex-Partner:      Emotionally Abused:      Physically Abused:      Sexually Abused:        MEDICATIONS: Reviewed from the MAR, physician orders, and/or earlier progress notes.  Post Discharge Medication Reconciliation Status: medication reconcilation previously completed during another office visit.    Current Outpatient Medications   Medication Sig     acetaminophen (TYLENOL) 500 MG tablet Take 1,000 mg by mouth 3 times daily     Calcium-Magnesium-Zinc 333-133-5 MG TABS per tablet Take 1 tablet by mouth daily     cholecalciferol (VITAMIN D3) 5000 units (125 mcg) capsule 1 CAP BY MOUTH DAILY (DX: OSTEOPOROSIS)     conjugated estrogens (PREMARIN) 0.625 MG/GM vaginal cream Apply locally to clitoral randall pea sized amount daily for 2 weeks then prn. (Patient not taking: Reported on 6/28/2021)      "docusate sodium (COLACE) 100 MG capsule Take 1 capsule (100 mg) by mouth 2 times daily (Patient not taking: Reported on 6/28/2021)     estradiol (ESTRACE) 0.1 MG/GM vaginal cream Apply locally to clitoral randall pea sized amount daily for 2 weeks then prn. (Patient not taking: Reported on 6/28/2021)     gabapentin (NEURONTIN) 300 MG capsule Take 1 capsule (300 mg) by mouth 3 times daily     metroNIDAZOLE (METROGEL) 0.75 % external gel Apply topically 2 times daily     oxyCODONE (ROXICODONE) 5 MG tablet Take 0.5 tablets (2.5 mg) by mouth every 6 hours as needed for pain     polyethylene glycol (MIRALAX) 17 GM/Dose powder Take 17 g by mouth daily     vitamin B complex with vitamin C (VITAMIN  B COMPLEX) TABS tablet Take 1 tablet by mouth daily     VITAMIN E PO Take 1 capsule by mouth daily     Current Facility-Administered Medications   Medication     heparin 100 UNIT/ML injection 500 Units     ALLERGIES:   Allergies   Allergen Reactions     Darvon [Propoxyphene]      Had reaction in teen years     Ketoconazole Rash     Penicillins Hives     As a child     DIET: Regular, regular texture, thin liquids.    Vitals:    08/23/21 1602   BP: 120/68   Pulse: 90   Resp: 16   Temp: 98.7  F (37.1  C)   SpO2: 95%   Weight: 54.3 kg (119 lb 12.8 oz)   Height: 1.626 m (5' 4\")     Body mass index is 20.56 kg/m .    EXAMINATION:   General: Pleasant, very loquacious (and rather funny), frail-appearing elderly female, sitting in a wheelchair having lunch, in no apparent distress.    Head: Normocephalic and atraumatic.   Eyes: PERRLA, sclerae clear.  Eyes are a bit red rimmed.  ENT: Moist oral mucosa.  She has many of her own teeth.  No rhinorrhea or nasal discharge.  Hearing appears to be quite good.  Cardiovascular: Regular rate and rhythm with intermittent ectopy and no appreciable murmur.   Respiratory: Lungs clear to auscultation bilaterally.   Abdomen: Nondistended.   Musculoskeletal/Extremities: Age-related degenerative joint " disease.  Bilateral moderate to severe hallux valgus deformities and hammertoes.  Currently, somewhat mild lower extremity edema.  Port-A-Cath in the right upper chest wall.  Integument: Hypertrophic lower extremity skin from venous stasis.  Otherwise, no rashes, clinically significant lesions, or skin breakdown.   Cognitive/Psychiatric: Clearly trying to have some cognitive deficits, though affect is quite upbeat.    DIAGNOSTICS:   Last Comprehensive Metabolic Panel:  Sodium   Date Value Ref Range Status   07/21/2021 142 133 - 144 mmol/L Final   06/14/2021 141 133 - 144 mmol/L Final     Potassium   Date Value Ref Range Status   07/21/2021 3.6 3.4 - 5.3 mmol/L Final   06/14/2021 3.7 3.4 - 5.3 mmol/L Final     Chloride   Date Value Ref Range Status   07/21/2021 111 (H) 94 - 109 mmol/L Final   06/14/2021 106 94 - 109 mmol/L Final     Carbon Dioxide   Date Value Ref Range Status   06/14/2021 31 20 - 32 mmol/L Final     Carbon Dioxide (CO2)   Date Value Ref Range Status   07/21/2021 27 20 - 32 mmol/L Final     Anion Gap   Date Value Ref Range Status   07/21/2021 4 3 - 14 mmol/L Final   06/14/2021 4 3 - 14 mmol/L Final     Glucose   Date Value Ref Range Status   07/21/2021 87 70 - 99 mg/dL Final   06/14/2021 66 (L) 70 - 99 mg/dL Final     Urea Nitrogen   Date Value Ref Range Status   07/21/2021 20 7 - 30 mg/dL Final   06/14/2021 24 7 - 30 mg/dL Final     Creatinine   Date Value Ref Range Status   07/21/2021 0.58 0.52 - 1.04 mg/dL Final   06/14/2021 0.70 0.52 - 1.04 mg/dL Final     GFR Estimate   Date Value Ref Range Status   07/21/2021 86 >60 mL/min/1.73m2 Final     Comment:     As of July 11, 2021, eGFR is calculated by the CKD-EPI creatinine equation, without race adjustment. eGFR can be influenced by muscle mass, exercise, and diet. The reported eGFR is an estimation only and is only applicable if the renal function is stable.   06/14/2021 80 >60 mL/min/[1.73_m2] Final     Comment:     Non  GFR  Calc  Starting 12/18/2018, serum creatinine based estimated GFR (eGFR) will be   calculated using the Chronic Kidney Disease Epidemiology Collaboration   (CKD-EPI) equation.       Calcium   Date Value Ref Range Status   07/21/2021 8.4 (L) 8.5 - 10.1 mg/dL Final   06/14/2021 9.5 8.5 - 10.1 mg/dL Final       Lab Results   Component Value Date    WBC 4.9 07/21/2021    WBC 5.0 06/14/2021     Lab Results   Component Value Date    RBC 3.53 07/21/2021    RBC 3.96 06/14/2021     Lab Results   Component Value Date    HGB 11.5 07/21/2021    HGB 12.8 06/14/2021     Lab Results   Component Value Date    HCT 36.5 07/21/2021    HCT 39.3 06/14/2021     Lab Results   Component Value Date     07/21/2021    MCV 99 06/14/2021     Lab Results   Component Value Date    MCH 32.6 07/21/2021    MCH 32.3 06/14/2021     Lab Results   Component Value Date    MCHC 31.5 07/21/2021    MCHC 32.6 06/14/2021     Lab Results   Component Value Date    RDW 13.7 07/21/2021    RDW 14.2 06/14/2021     Lab Results   Component Value Date     07/21/2021     06/14/2021       ASSESSMENT/Plan:      ICD-10-CM    1. Periprosthetic fracture of right hip, subsequent encounter  M97.8XXD     Z96.649    2. History of malignant neoplasm of anus  Z85.048    3. Mild cognitive impairment  G31.84    4. Insomnia, unspecified type  G47.00    5. Atrophic vaginitis  N95.2        The patient is, or has been, under my care and I have initiated the establishment of the plan of care. This patient will be followed by a primary care provider who will periodically review the plan of care.  Initial follow-up should be within 7-10 days.      The above has been created using voice recognition software. Please be aware that this may unintentionally  produce inaccuracies and/or nonsensical sentences.      Electronically signed by: GRAHAM Trejo CNP

## 2021-08-27 ENCOUNTER — VIRTUAL VISIT (OUTPATIENT)
Dept: RADIATION ONCOLOGY | Facility: CLINIC | Age: 81
End: 2021-08-27
Attending: RADIOLOGY
Payer: MEDICARE

## 2021-08-27 DIAGNOSIS — C21.1 MALIGNANT NEOPLASM OF ANAL CANAL (H): Primary | ICD-10-CM

## 2021-08-27 NOTE — PROGRESS NOTES
"Elizabeth Taylor is an 81 year old female who is being evaluated via a billable telephone visit.      The patient has been notified of following:     \"This telephone visit will be conducted via a call between you and your physician/provider. We have found that certain health care needs can be provided without the need for a physical exam.  This service lets us provide the care you need with a short phone conversation.  If a prescription is necessary we can send it directly to your pharmacy.  If lab work is needed we can place an order for that and you can then stop by our lab to have the test done at a later time.    Telephone visits are billed at different rates depending on your insurance coverage. During this emergency period, for some insurers they may be billed the same as an in-person visit.  Please reach out to your insurance provider with any questions.    If during the course of the call the physician/provider feels a telephone visit is not appropriate, you will not be charged for this service.\"    Patient has given verbal consent for Telephone visit?  Yes    What phone number would you like to be contacted at? [phone]    How would you like to obtain your AVS? [employer]    Phone call start: 1140  Phone call end: 1153  Phone call duration: 13 minutes         Department of Radiation Oncology  [address] [address] [phone]       Radiation Oncology Follow-up Visit  Aug 27, 2021    Elizabeth Taylor  MRN: [id number]   : 1940     DISEASE TREATED:   cT2 N1a M0 squamous cell carcinoma of the anal canal    RADIATION THERAPY DELIVERED:   5400 cGy delivered over 30 once-daily fractions, from 2018 - 3/23/2018    SYSTEMIC THERAPY:  Mitomycin-C and 5-FU    INTERVAL SINCE COMPLETION OF RADIATION THERAPY:   Approximately 3.5 years    SUBJECTIVE:   Elizabeth Taylor is an 81 year old female with a PMH significant for a locally advanced " squamous cell carcinoma of the anal canal after she presented with progressive anal pain and small amounts of bright red blood per rectum. Staging evaluation revealed a 2.5 cm mass located approximately 1 cm from the anal verge with involvement of the internal anal sphincter but not the external anal sphincter or levator ani. Two FDG-avid left inguinal nodes were also appreciated.  She received curative-intent chemoradiotherapy as described above with concurrent mitomycin-C and 5-FU. Mitomycin was held during the second cycle of systemic therapy due to concern for treatment-induced toxicities given her age and borderline functional status.    On interview today, Ms. Taylor reports that she is doing okay and has no specific concerns or complaints. She is currently residing in a care facility with her overall health predominantly affected by a recent fall and nondisplaced right greater trochanteric fracture which is being managed by the orthopedic surgery service. Regarding her GI symptoms, she reports having approximately 3 bowel movements per day and does describe them as being solid in consistency. She continues to have increased rectal urgency from her pre-treatment baseline as well as urinary frequency and urgency with occasional episodes of urinary incontinence. She denies any hematochezia or hematuria and has no pelvic pain. Her remaining ROS are otherwise negative. She had a repeat MRI of the pelvis performed in  which demonstrates posttreatment changes with no evidence of recurrent disease. She continues to be followed by colorectal surgery with plans for a surveillance colonoscopy and MRI pelvis in .    LABS AND IMAGIN2021 MR pelvis:  Posttreatment changes without evidence of recurrent disease    IMPRESSION:   Ms. Taylor is an 81 year old female with a previous cT2 N1a M0 squamous cell carcinoma of the anal canal status post chemoradiotherapy. She is approximately 3 and half years out from  the completion of chemoradiotherapy and remains radiologically MARGARET.    PLAN:   1. Follow-up in radiation oncology clinic with NP in 12 months and as needed thereafter  2. Continue follow-up with colorectal surgery as scheduled    Zaire Madison MD/PhD    Dept of Radiation Oncology  [facility]

## 2021-08-27 NOTE — LETTER
"    2021         RE: Elizabeth Taylor  [address] [facility] [address]      FOLLOW-UP VISIT    Patient Name: Elizabeth Taylor      : 1940     Age: 81 year old        ______________________________________________________________________________     Chief Complaint   Patient presents with     Cancer     Follow up:Anal Cancer 5400 cGy completed on 3/23/18     Pain  Denies    Labs  Other Labs: No    Imaging  None      Other Appointments:     MD Name:  Appointment Date:    MD Name: Appointment Date:   MD Name: Appointment Date:   Other Appointment Notes:     Residual Radiation side effect: Does not like the dilator, not much use     Additional Instructions:               Elizabeth Taylor is an 81 year old female who is being evaluated via a billable telephone visit.      The patient has been notified of following:     \"This telephone visit will be conducted via a call between you and your physician/provider. We have found that certain health care needs can be provided without the need for a physical exam.  This service lets us provide the care you need with a short phone conversation.  If a prescription is necessary we can send it directly to your pharmacy.  If lab work is needed we can place an order for that and you can then stop by our lab to have the test done at a later time.    Telephone visits are billed at different rates depending on your insurance coverage. During this emergency period, for some insurers they may be billed the same as an in-person visit.  Please reach out to your insurance provider with any questions.    If during the course of the call the physician/provider feels a telephone visit is not appropriate, you will not be charged for this service.\"    Patient has given verbal consent for Telephone visit?  Yes    What phone number would you like to be contacted at? [phone]    How would you like to obtain your AVS? [employer]    Phone call start: 1140  Phone " call end: 1153  Phone call duration: 13 minutes         Department of Radiation Oncology  [facility]  [address] [place] [address]  [phone]       Radiation Oncology Follow-up Visit  Aug 27, 2021    Elizabeth Taylor  MRN: [id number]   : 1940     DISEASE TREATED:   cT2 N1a M0 squamous cell carcinoma of the anal canal    RADIATION THERAPY DELIVERED:   5400 cGy delivered over 30 once-daily fractions, from 2018 - 3/23/2018    SYSTEMIC THERAPY:  Mitomycin-C and 5-FU    INTERVAL SINCE COMPLETION OF RADIATION THERAPY:   Approximately 3.5 years    SUBJECTIVE:   Elizabeth Taylor is an 81 year old female with a PMH significant for a locally advanced squamous cell carcinoma of the anal canal after she presented with progressive anal pain and small amounts of bright red blood per rectum. Staging evaluation revealed a 2.5 cm mass located approximately 1 cm from the anal verge with involvement of the internal anal sphincter but not the external anal sphincter or levator ani. Two FDG-avid left inguinal nodes were also appreciated.  She received curative-intent chemoradiotherapy as described above with concurrent mitomycin-C and 5-FU. Mitomycin was held during the second cycle of systemic therapy due to concern for treatment-induced toxicities given her age and borderline functional status.    On interview today, Ms. Taylor reports that she is doing okay and has no specific concerns or complaints. She is currently residing in a care facility with her overall health predominantly affected by a recent fall and nondisplaced right greater trochanteric fracture which is being managed by the orthopedic surgery service. Regarding her GI symptoms, she reports having approximately 3 bowel movements per day and does describe them as being solid in consistency. She continues to have increased rectal urgency from her pre-treatment baseline as well as urinary frequency and urgency  with occasional episodes of urinary incontinence. She denies any hematochezia or hematuria and has no pelvic pain. Her remaining ROS are otherwise negative. She had a repeat MRI of the pelvis performed in  which demonstrates posttreatment changes with no evidence of recurrent disease. She continues to be followed by colorectal surgery with plans for a surveillance colonoscopy and MRI pelvis in .    LABS AND IMAGIN2021 MR pelvis:  Posttreatment changes without evidence of recurrent disease    IMPRESSION:   Ms. Taylor is an 81 year old female with a previous cT2 N1a M0 squamous cell carcinoma of the anal canal status post chemoradiotherapy. She is approximately 3 and half years out from the completion of chemoradiotherapy and remains radiologically MARGARET.    PLAN:   1. Follow-up in radiation oncology clinic with NP in 12 months and as needed thereafter  2. Continue follow-up with colorectal surgery as scheduled    Zaire Madison MD/PhD    Dept of Radiation Oncology  [facility]

## 2021-08-27 NOTE — PROGRESS NOTES
FOLLOW-UP VISIT    Patient Name: Elizabeth Taylor      : 1940     Age: 81 year old        ______________________________________________________________________________     Chief Complaint   Patient presents with     Cancer     Follow up:Anal Cancer 5400 cGy completed on 3/23/18     Pain  Denies    Labs  Other Labs: No    Imaging  None      Other Appointments:     MD Name:  Appointment Date:    MD Name: Appointment Date:   MD Name: Appointment Date:   Other Appointment Notes:     Residual Radiation side effect: Does not like the dilator, not much use     Additional Instructions:

## 2021-08-30 ENCOUNTER — TELEPHONE (OUTPATIENT)
Dept: ORTHOPEDICS | Facility: CLINIC | Age: 81
End: 2021-08-30

## 2021-08-30 NOTE — TELEPHONE ENCOUNTER
[facility] Call Center    Phone Message:  Jose from [facility] called, stating pt has Dementia and did not bring her Appt Notes/Visit Summary back to the care center with her.    Jose, staff member of the [facility] center, would like a copy of the Appt Notes and Visit Summary, from pt's last visit with MD Sylvester, faxed to him.    FAX to [facility]:  [phone]  Attn:  Jose    If you have any questions or concerns, in regards to this request from Jose, please call him at [phone].    May a detailed message be left on voicemail: Yes     Reason for Call: Other: FAX Appt Notes     Action Taken: Message routed to:  [facility] ([facility] Team    Travel Screening: Not Applicable

## 2021-09-05 ENCOUNTER — HEALTH MAINTENANCE LETTER (OUTPATIENT)
Age: 81
End: 2021-09-05

## 2021-09-08 NOTE — PROGRESS NOTES
Community Paramedic Program Contact  UTC/Voicemail    CP Community Health Worker Outreach  Outreach attempted x 1.  Left message on patient's voicemail with call back information and requested return call.  Plan: Community Health Worker will try to reach patient again in 10 business days.

## 2021-09-10 ENCOUNTER — PATIENT OUTREACH (OUTPATIENT)
Dept: NURSING | Facility: CLINIC | Age: 81
End: 2021-09-10
Payer: MEDICARE

## 2021-09-10 NOTE — PROGRESS NOTES
Clinic Care Coordination Contact    Community Health Worker Follow Up  Spoke with patient.     Care Gaps:     Health Maintenance Due   Topic Date Due     DTAP/TDAP/TD IMMUNIZATION (1 - Tdap) Never done     ZOSTER IMMUNIZATION (1 of 2) Never done     MEDICARE ANNUAL WELLNESS VISIT  Never done     Pneumococcal Vaccine: 65+ Years (1 of 1 - PPSV23) Never done     INFLUENZA VACCINE (1) 09/01/2021       CHW did not review care gaps.    Goals: CHW did not review goals.       Intervention and Education during outreach: CHW introduced self and role with CC. CHW informed patient that CHW works closely with RACHEL Moraes CP.   CHW inquired if patient needs any additional support or resources however patient declined. Patient states she's all settled into [facility] and is doing well. She was going to take a nap.  CHW thanked patient for the update and informed her that RACHEL Moraes CP will try to reach out to her again.     CHW Plan: CHW to follow up as needed per lead CHW [facility] Luo and [facility] clinician.    Samantha Seth  Clinic Care Coordination  [facility], and [facility]    Phone: [phone]

## 2021-09-13 ENCOUNTER — NURSING HOME VISIT (OUTPATIENT)
Dept: GERIATRICS | Facility: CLINIC | Age: 81
End: 2021-09-13
Payer: MEDICARE

## 2021-09-13 VITALS
DIASTOLIC BLOOD PRESSURE: 84 MMHG | BODY MASS INDEX: 21.27 KG/M2 | SYSTOLIC BLOOD PRESSURE: 153 MMHG | WEIGHT: 124.6 LBS | OXYGEN SATURATION: 96 % | TEMPERATURE: 96.9 F | HEIGHT: 64 IN | HEART RATE: 60 BPM | RESPIRATION RATE: 18 BRPM

## 2021-09-13 DIAGNOSIS — N95.2 ATROPHIC VAGINITIS: ICD-10-CM

## 2021-09-13 DIAGNOSIS — G47.00 INSOMNIA, UNSPECIFIED TYPE: ICD-10-CM

## 2021-09-13 DIAGNOSIS — G31.84 MILD COGNITIVE IMPAIRMENT: ICD-10-CM

## 2021-09-13 DIAGNOSIS — Z87.81 STATUS POST FRACTURE OF RIGHT HIP: ICD-10-CM

## 2021-09-13 DIAGNOSIS — Z85.048 HISTORY OF MALIGNANT NEOPLASM OF ANUS: Primary | ICD-10-CM

## 2021-09-13 PROCEDURE — 99309 SBSQ NF CARE MODERATE MDM 30: CPT | Performed by: NURSE PRACTITIONER

## 2021-09-13 ASSESSMENT — MIFFLIN-ST. JEOR: SCORE: 1010.18

## 2021-09-13 NOTE — LETTER
"    2021        RE: Elizabeth Taylor  [address] [facility] [address]        [facility]    Name:   Elizabeth Taylor  :   1940  MRN:    [id number]     Facility:   [facility] () [[id number]]   Room: [place] [address]  Code Status: FULL CODE and POLST AVAILABLE -     DOS: 2021  Previous visit: 2021    PCP:  Jann Henderson    CHIEF COMPLAINT / REASON FOR VISIT:  Chief Complaint   Patient presents with     Clinic Care Coordination - Follow-up     Multiple concerns      [facility] from 2021 until 2021 (right periprosthetic hip fracture)  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  She had tried managing right hip pain as an outpatient, but it soon became unbearable.  X-rays were performed at the direction of her PCP, and they showed \"suspicion of fracture\" of the right hip.  In the ED, a CT showed a periprosthetic fracture.  Orthopedics evaluated patient and advised that she could be weightbearing as tolerated with assistant without the need for surgical intervention.  She was admitted to the ED observation unit and was not treated with scheduled acetaminophen, gabapentin, cyclobenzaprine, Lidoderm patch, and as needed oxycodone.  Her pain was well controlled with these measures.  However, she did have some confusion, and the patient was discussed with her sister, Sahil.  Sahil confirmed that the patient has baseline memory problems.  Patient and ultimately the TCU were advised to only use " "oxycodone for severe breakthrough pain, as there is concern of worsening her confusion.  Prior to discharge, the cyclobenzaprine was discontinued, and he was discharged to the TCU on the remaining medications.  She can be weightbearing as tolerated with a walker and should follow-up with Dr. Phan in 6 weeks for repeat x-rays.      CURRENT/RECENT ISSUES    Disposition:     We discussed her full code status, and she is now of the opinion that she would want to be revived, saying, \"right now, my heart feels very strong.\"  She also complains of \"blindly taking all these pills,\" and she has been refusing vitamin E and B complex.  We talked about this and agreed to discuss it again later.  Today's visit and up about her telling me all the things she has been doing.  She says that she is much improved.  She does admit that she does not walk by herself at this point in time.  The most important question to her was being able to walk without pain, and this \"went away a few weeks ago.\"     While in the TCU, she indicated she would like to stay here.  \"This is the best place in the world for me.  I want to get well, but I don't want to leave here,\" she says.  She will be moving into [facility], and she says she likes the room and the window view.    [facility]. Zaire Phan, spine surgery).  She was given an okay activities as tolerated.  She also had a visit on 08/27/2021 with [facility] (Dr. Zaire Blanca).  It was noted that she is 3.5 years out from the completion of chemoradiotherapy and remains radiologically MARGARET.  She is to follow-up with the oncology NP in 12 months and as needed thereafter.    Cognition: She is very cogent this morning.  She can tell me the complete date.  At the initial visit, the patient admitted to some confusion, stating that she did not know where she " "was.  Speech tended to be rambling.  I requested OT or SLP to perform cognitive assessments which clearly indicated that assisted living or long-term care would be appropriate and that she requires 24-hour care.    Insomnia: She had been unable to sleep.  She told me that it had been \"catastrophic the first night.\"  We recently added 25 mg of trazodone at bedtime, later increasing that to 50 mg, and it appeared to be working.  She has describes, however, years worth of \"flexible sleeping.\"  Ultimately, she requested that the trazodone be discontinued, as she felt it made her confused and incontinent during the night.  She tells me she has not slept for years, surviving on just a \"couple hours at a time.\".      ROS: No headaches or chest pains, coughing or congestion, nausea or vomiting, dizziness or dyspnea, dysuria, difficulty chewing or swallowing, integumentary issues, or problems with appetite.  The patient and nursing staff indicate that she is not sleeping.  She stated that it's normal for her.    Past Medical History:   Diagnosis Date     Anal cancer (H)      Endometriosis, site unspecified      History of malignant neoplasm of anus 7/26/2021     Periprosthetic fracture of right hip, subsequent encounter 7/26/2021     Thyroiditis, unspecified     Thryoiditis-resolved              Family History   Problem Relation Age of Onset     Cancer Sister      Cancer Maternal Grandmother         lymphoma     Heart Disease Father         MI     Uterine Cancer Niece      Social History     Socioeconomic History     Marital status: Single     Spouse name: None     Number of children: None     Years of education: None     Highest education level: None   Occupational History     None   Tobacco Use     Smoking status: Never Smoker     Smokeless tobacco: Never Used   Substance and Sexual Activity     Alcohol use: No     Drug use: No     Sexual activity: Yes     Partners: Male   Other Topics Concern     Parent/sibling w/ CABG, " MI or angioplasty before 65F 55M? Not Asked   Social History Narrative     None     Social Determinants of Health     Financial Resource Strain: Medium Risk     Difficulty of Paying Living Expenses: Somewhat hard   Food Insecurity: No Food Insecurity     Worried About Running Out of Food in the Last Year: Never true     Ran Out of Food in the Last Year: Never true   Transportation Needs: No Transportation Needs     Lack of Transportation (Medical): No     Lack of Transportation (Non-Medical): No   Physical Activity: Insufficiently Active     Days of Exercise per Week: 5 days     Minutes of Exercise per Session: 10 min   Stress: Stress Concern Present     Feeling of Stress : To some extent   Social Connections:      Frequency of Communication with Friends and Family:      Frequency of Social Gatherings with Friends and Family:      Attends Mu-ism Services:      Active Member of Clubs or Organizations:      Attends Club or Organization Meetings:      Marital Status:    Intimate Partner Violence:      Fear of Current or Ex-Partner:      Emotionally Abused:      Physically Abused:      Sexually Abused:        MEDICATIONS: Reviewed from the MAR, physician orders, and/or earlier progress notes.    Current Outpatient Medications   Medication Sig     acetaminophen (TYLENOL) 500 MG tablet Take 1,000 mg by mouth 3 times daily     Calcium-Magnesium-Zinc 333-133-5 MG TABS per tablet Take 1 tablet by mouth daily     cholecalciferol (VITAMIN D3) 5000 units (125 mcg) capsule 1 CAP BY MOUTH DAILY (DX: OSTEOPOROSIS)     conjugated estrogens (PREMARIN) 0.625 MG/GM vaginal cream Apply locally to clitoral randall pea sized amount daily for 2 weeks then prn. (Patient not taking: Reported on 6/28/2021)     docusate sodium (COLACE) 100 MG capsule Take 1 capsule (100 mg) by mouth 2 times daily (Patient not taking: Reported on 6/28/2021)     estradiol (ESTRACE) 0.1 MG/GM vaginal cream Apply locally to clitoral randall pea sized amount daily  "for 2 weeks then prn. (Patient not taking: Reported on 6/28/2021)     gabapentin (NEURONTIN) 300 MG capsule Take 1 capsule (300 mg) by mouth 3 times daily     metroNIDAZOLE (METROGEL) 0.75 % external gel Apply topically 2 times daily     oxyCODONE (ROXICODONE) 5 MG tablet Take 0.5 tablets (2.5 mg) by mouth every 6 hours as needed for pain     polyethylene glycol (MIRALAX) 17 GM/Dose powder Take 17 g by mouth daily     vitamin B complex with vitamin C (VITAMIN  B COMPLEX) TABS tablet Take 1 tablet by mouth daily     VITAMIN E PO Take 1 capsule by mouth daily     Current Facility-Administered Medications   Medication     heparin 100 UNIT/ML injection 500 Units     ALLERGIES:   Allergies   Allergen Reactions     Darvon [Propoxyphene]      Had reaction in teen years     Ketoconazole Rash     Penicillins Hives     As a child     DIET: Regular, regular texture, thin liquids.    Vitals:    09/13/21 1151   BP: (!) 153/84   Pulse: 60   Resp: 18   Temp: 96.9  F (36.1  C)   SpO2: 96%   Weight: 56.5 kg (124 lb 9.6 oz)   Height: 1.626 m (5' 4\")     Body mass index is 21.39 kg/m .    EXAMINATION:   General: Pleasant, very loquacious (and rather funny), frail-appearing elderly female, sitting on her bed, in no apparent distress.  She goes on tangents from a 1 subject to another.  Head: Normocephalic and atraumatic.   Eyes: PERRLA, sclerae clear.  Eyes are a bit red rimmed.  ENT: Moist oral mucosa.  She has many of her own teeth.  No rhinorrhea or nasal discharge.  Hearing appears to be quite good.  Cardiovascular: Regular rate and rhythm with intermittent ectopy and no appreciable murmur.   Respiratory: Lungs clear to auscultation bilaterally.   Abdomen: Nondistended.   Musculoskeletal/Extremities: Age-related degenerative joint disease.  The right leg is \"2 inches shorter\" than the left.  Bilateral moderate to severe hallux valgus deformities and hammertoes.  Currently, somewhat mild lower extremity edema.  Port-A-Cath in the " right upper chest wall.  Integument: Hypertrophic lower extremity skin from venous stasis, especially the left leg.  Otherwise, no rashes, clinically significant lesions, or skin breakdown.   Cognitive/Psychiatric: Clearly trying to have some cognitive deficits, though affect is quite upbeat.    DIAGNOSTICS:   Last Comprehensive Metabolic Panel:  Sodium   Date Value Ref Range Status   07/21/2021 142 133 - 144 mmol/L Final   06/14/2021 141 133 - 144 mmol/L Final     Potassium   Date Value Ref Range Status   07/21/2021 3.6 3.4 - 5.3 mmol/L Final   06/14/2021 3.7 3.4 - 5.3 mmol/L Final     Chloride   Date Value Ref Range Status   07/21/2021 111 (H) 94 - 109 mmol/L Final   06/14/2021 106 94 - 109 mmol/L Final     Carbon Dioxide   Date Value Ref Range Status   06/14/2021 31 20 - 32 mmol/L Final     Carbon Dioxide (CO2)   Date Value Ref Range Status   07/21/2021 27 20 - 32 mmol/L Final     Anion Gap   Date Value Ref Range Status   07/21/2021 4 3 - 14 mmol/L Final   06/14/2021 4 3 - 14 mmol/L Final     Glucose   Date Value Ref Range Status   07/21/2021 87 70 - 99 mg/dL Final   06/14/2021 66 (L) 70 - 99 mg/dL Final     Urea Nitrogen   Date Value Ref Range Status   07/21/2021 20 7 - 30 mg/dL Final   06/14/2021 24 7 - 30 mg/dL Final     Creatinine   Date Value Ref Range Status   07/21/2021 0.58 0.52 - 1.04 mg/dL Final   06/14/2021 0.70 0.52 - 1.04 mg/dL Final     GFR Estimate   Date Value Ref Range Status   07/21/2021 86 >60 mL/min/1.73m2 Final     Comment:     As of July 11, 2021, eGFR is calculated by the CKD-EPI creatinine equation, without race adjustment. eGFR can be influenced by muscle mass, exercise, and diet. The reported eGFR is an estimation only and is only applicable if the renal function is stable.   06/14/2021 80 >60 mL/min/[1.73_m2] Final     Comment:     Non  GFR Calc  Starting 12/18/2018, serum creatinine based estimated GFR (eGFR) will be   calculated using the Chronic Kidney Disease  Epidemiology Collaboration   (CKD-EPI) equation.       Calcium   Date Value Ref Range Status   07/21/2021 8.4 (L) 8.5 - 10.1 mg/dL Final   06/14/2021 9.5 8.5 - 10.1 mg/dL Final       Lab Results   Component Value Date    WBC 4.9 07/21/2021    WBC 5.0 06/14/2021     Lab Results   Component Value Date    RBC 3.53 07/21/2021    RBC 3.96 06/14/2021     Lab Results   Component Value Date    HGB 11.5 07/21/2021    HGB 12.8 06/14/2021     Lab Results   Component Value Date    HCT 36.5 07/21/2021    HCT 39.3 06/14/2021     Lab Results   Component Value Date     07/21/2021    MCV 99 06/14/2021     Lab Results   Component Value Date    MCH 32.6 07/21/2021    MCH 32.3 06/14/2021     Lab Results   Component Value Date    MCHC 31.5 07/21/2021    MCHC 32.6 06/14/2021     Lab Results   Component Value Date    RDW 13.7 07/21/2021    RDW 14.2 06/14/2021     Lab Results   Component Value Date     07/21/2021     06/14/2021       ASSESSMENT/Plan:      ICD-10-CM    1. History of malignant neoplasm of anus  Z85.048    2. Status post fracture of right hip  Z87.81    3. Mild cognitive impairment  G31.84    4. Insomnia, unspecified type  G47.00    5. Atrophic vaginitis  N95.2      CHANGES:  None.    CARE PLAN:  The care plan has been reviewed and all orders signed.  Changes to care plan, if any, as noted.  Otherwise, continue current plan of care.    The above has been created using voice recognition software. Please be aware that this may unintentionally  produce inaccuracies and/or nonsensical sentences.      Electronically signed by: GRAHAM Trejo CNP        Sincerely,        GRAHAM Trejo CNP

## 2021-09-19 PROBLEM — Z87.81 STATUS POST FRACTURE OF RIGHT HIP: Status: ACTIVE | Noted: 2021-09-19

## 2021-09-19 NOTE — PROGRESS NOTES
"[facility]    Name:   Elizabeth Taylor  :   1940  MRN:    [id number]     Facility:   [facility] [[id number]]   Room: [facility] 115  Code Status: FULL CODE and POLST AVAILABLE -     DOS: 2021  Previous visit: 2021    PCP:  Jann Henderson    CHIEF COMPLAINT / REASON FOR VISIT:  Chief Complaint   Patient presents with     Clinic Care Coordination - Follow-up     Multiple concerns      [facility] from 2021 until 2021 (right periprosthetic hip fracture)  [facility] TCU from 2021 until 2021 (transfer from TCU to board and care)      HPI: Elizabeth is an 81 year old female with a past medical history significant for 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  She had tried managing right hip pain as an outpatient, but it soon became unbearable.  X-rays were performed at the direction of her PCP, and they showed \"suspicion of fracture\" of the right hip.  In the ED, a CT showed a periprosthetic fracture.  Orthopedics evaluated patient and advised that she could be weightbearing as tolerated with assistant without the need for surgical intervention.  She was admitted to the ED observation unit and was not treated with scheduled acetaminophen, gabapentin, cyclobenzaprine, Lidoderm patch, and as needed oxycodone.  Her pain was well controlled with these measures.  However, she did have some confusion, and the patient was discussed with her sister, Sahil.  Sahil confirmed that the patient has baseline memory problems.  Patient and ultimately the TCU were advised to only use oxycodone for severe breakthrough pain, as there is concern of worsening her confusion.  Prior to " "discharge, the cyclobenzaprine was discontinued, and he was discharged to the TCU on the remaining medications.  She can be weightbearing as tolerated with a walker and should follow-up with Dr. Phan in 6 weeks for repeat x-rays.      CURRENT/RECENT ISSUES    Disposition:     We discussed her full code status, and she is now of the opinion that she would want to be revived, saying, \"right now, my heart feels very strong.\"  She also complains of \"blindly taking all these pills,\" and she has been refusing vitamin E and B complex.  We talked about this and agreed to discuss it again later.  Today's visit and up about her telling me all the things she has been doing.  She says that she is much improved.  She does admit that she does not walk by herself at this point in time.  The most important question to her was being able to walk without pain, and this \"went away a few weeks ago.\"     While in the TCU, she indicated she would like to stay here.  \"This is the best place in the world for me.  I want to get well, but I don't want to leave here,\" she says.  She will be moving into [facility] and [facility], and she says she likes the room and the window view.    She has [facility]Dr. Zaire Phan, spine surgery).  She was given an okay activities as tolerated.  She also had a visit on 08/27/2021 with [facility] (Dr. Zaire Blanca).  It was noted that she is 3.5 years out from the completion of chemoradiotherapy and remains radiologically MARGARET.  She is to follow-up with the oncology NP in 12 months and as needed thereafter.    Cognition: She is very cogent this morning.  She can tell me the complete date.  At the initial visit, the patient admitted to some confusion, stating that she did not know where she was.  Speech tended to be rambling.  I requested OT or SLP to perform cognitive assessments which " "clearly indicated that assisted living or long-term care would be appropriate and that she requires 24-hour care.    Insomnia: She had been unable to sleep.  She told me that it had been \"catastrophic the first night.\"  We recently added 25 mg of trazodone at bedtime, later increasing that to 50 mg, and it appeared to be working.  She has describes, however, years worth of \"flexible sleeping.\"  Ultimately, she requested that the trazodone be discontinued, as she felt it made her confused and incontinent during the night.  She tells me she has not slept for years, surviving on just a \"couple hours at a time.\".      ROS: No headaches or chest pains, coughing or congestion, nausea or vomiting, dizziness or dyspnea, dysuria, difficulty chewing or swallowing, integumentary issues, or problems with appetite.  The patient and nursing staff indicate that she is not sleeping.  She stated that it's normal for her.    Past Medical History:   Diagnosis Date     Anal cancer (H)      Endometriosis, site unspecified      History of malignant neoplasm of anus 7/26/2021     Periprosthetic fracture of right hip, subsequent encounter 7/26/2021     Thyroiditis, unspecified     Thryoiditis-resolved              Family History   Problem Relation Age of Onset     Cancer Sister      Cancer Maternal Grandmother         lymphoma     Heart Disease Father         MI     Uterine Cancer Niece      Social History     Socioeconomic History     Marital status: Single     Spouse name: None     Number of children: None     Years of education: None     Highest education level: None   Occupational History     None   Tobacco Use     Smoking status: Never Smoker     Smokeless tobacco: Never Used   Substance and Sexual Activity     Alcohol use: No     Drug use: No     Sexual activity: Yes     Partners: Male   Other Topics Concern     Parent/sibling w/ CABG, MI or angioplasty before 65F 55M? Not Asked   Social History Narrative     None     Social " Determinants of Health     Financial Resource Strain: Medium Risk     Difficulty of Paying Living Expenses: Somewhat hard   Food Insecurity: No Food Insecurity     Worried About Running Out of Food in the Last Year: Never true     Ran Out of Food in the Last Year: Never true   Transportation Needs: No Transportation Needs     Lack of Transportation (Medical): No     Lack of Transportation (Non-Medical): No   Physical Activity: Insufficiently Active     Days of Exercise per Week: 5 days     Minutes of Exercise per Session: 10 min   Stress: Stress Concern Present     Feeling of Stress : To some extent   Social Connections:      Frequency of Communication with Friends and Family:      Frequency of Social Gatherings with Friends and Family:      Attends Restoration Services:      Active Member of Clubs or Organizations:      Attends Club or Organization Meetings:      Marital Status:    Intimate Partner Violence:      Fear of Current or Ex-Partner:      Emotionally Abused:      Physically Abused:      Sexually Abused:        MEDICATIONS: Reviewed from the MAR, physician orders, and/or earlier progress notes.    Current Outpatient Medications   Medication Sig     acetaminophen (TYLENOL) 500 MG tablet Take 1,000 mg by mouth 3 times daily     Calcium-Magnesium-Zinc 333-133-5 MG TABS per tablet Take 1 tablet by mouth daily     cholecalciferol (VITAMIN D3) 5000 units (125 mcg) capsule 1 CAP BY MOUTH DAILY (DX: OSTEOPOROSIS)     conjugated estrogens (PREMARIN) 0.625 MG/GM vaginal cream Apply locally to clitoral randall pea sized amount daily for 2 weeks then prn. (Patient not taking: Reported on 6/28/2021)     docusate sodium (COLACE) 100 MG capsule Take 1 capsule (100 mg) by mouth 2 times daily (Patient not taking: Reported on 6/28/2021)     estradiol (ESTRACE) 0.1 MG/GM vaginal cream Apply locally to clitoral randall pea sized amount daily for 2 weeks then prn. (Patient not taking: Reported on 6/28/2021)     gabapentin (NEURONTIN)  "300 MG capsule Take 1 capsule (300 mg) by mouth 3 times daily     oxyCODONE (ROXICODONE) 5 MG tablet Take 0.5 tablets (2.5 mg) by mouth every 6 hours as needed for pain     polyethylene glycol (MIRALAX) 17 GM/Dose powder Take 17 g by mouth daily     vitamin B complex with vitamin C (VITAMIN  B COMPLEX) TABS tablet Take 1 tablet by mouth daily     VITAMIN E PO Take 1 capsule by mouth daily     Current Facility-Administered Medications   Medication     heparin 100 UNIT/ML injection 500 Units     ALLERGIES:   Allergies   Allergen Reactions     Darvon [Propoxyphene]      Had reaction in teen years     Ketoconazole Rash     Penicillins Hives     As a child     DIET: Regular, regular texture, thin liquids.    Vitals:    09/13/21 1151   BP: (!) 153/84   Pulse: 60   Resp: 18   Temp: 96.9  F (36.1  C)   SpO2: 96%   Weight: 56.5 kg (124 lb 9.6 oz)   Height: 1.626 m (5' 4\")     Body mass index is 21.39 kg/m .    EXAMINATION:   General: Pleasant, very loquacious (and rather funny), frail-appearing elderly female, sitting on her bed, in no apparent distress.  She goes on tangents from a 1 subject to another.  Head: Normocephalic and atraumatic.   Eyes: PERRLA, sclerae clear.  Eyes are a bit red rimmed.  ENT: Moist oral mucosa.  She has many of her own teeth.  No rhinorrhea or nasal discharge.  Hearing appears to be quite good.  Cardiovascular: Regular rate and rhythm with intermittent ectopy and no appreciable murmur.   Respiratory: Lungs clear to auscultation bilaterally.   Abdomen: Nondistended.   Musculoskeletal/Extremities: Age-related degenerative joint disease.  The right leg is \"2 inches shorter\" than the left.  Bilateral moderate to severe hallux valgus deformities and hammertoes.  Currently, somewhat mild lower extremity edema.  Port-A-Cath in the right upper chest wall.  Integument: Hypertrophic lower extremity skin from venous stasis, especially the left leg.  Otherwise, no rashes, clinically significant lesions, or " skin breakdown.   Cognitive/Psychiatric: Clearly trying to have some cognitive deficits, though affect is quite upbeat.    DIAGNOSTICS:   Last Comprehensive Metabolic Panel:  Sodium   Date Value Ref Range Status   10/13/2021 139 136 - 145 mmol/L Final   06/14/2021 141 133 - 144 mmol/L Final     Potassium   Date Value Ref Range Status   10/13/2021 4.5 3.5 - 5.0 mmol/L Final   06/14/2021 3.7 3.4 - 5.3 mmol/L Final     Chloride   Date Value Ref Range Status   10/13/2021 102 98 - 107 mmol/L Final   06/14/2021 106 94 - 109 mmol/L Final     Carbon Dioxide   Date Value Ref Range Status   06/14/2021 31 20 - 32 mmol/L Final     Carbon Dioxide (CO2)   Date Value Ref Range Status   10/13/2021 27 22 - 31 mmol/L Final     Anion Gap   Date Value Ref Range Status   10/13/2021 10 5 - 18 mmol/L Final   06/14/2021 4 3 - 14 mmol/L Final     Glucose   Date Value Ref Range Status   10/13/2021 80 70 - 125 mg/dL Final   06/14/2021 66 (L) 70 - 99 mg/dL Final     Urea Nitrogen   Date Value Ref Range Status   10/13/2021 23 8 - 28 mg/dL Final   06/14/2021 24 7 - 30 mg/dL Final     Creatinine   Date Value Ref Range Status   10/13/2021 0.76 0.60 - 1.10 mg/dL Final   06/14/2021 0.70 0.52 - 1.04 mg/dL Final     GFR Estimate   Date Value Ref Range Status   10/13/2021 73 >60 mL/min/1.73m2 Final     Comment:     As of July 11, 2021, eGFR is calculated by the CKD-EPI creatinine equation, without race adjustment. eGFR can be influenced by muscle mass, exercise, and diet. The reported eGFR is an estimation only and is only applicable if the renal function is stable.   06/14/2021 80 >60 mL/min/[1.73_m2] Final     Comment:     Non  GFR Calc  Starting 12/18/2018, serum creatinine based estimated GFR (eGFR) will be   calculated using the Chronic Kidney Disease Epidemiology Collaboration   (CKD-EPI) equation.       Calcium   Date Value Ref Range Status   10/13/2021 9.4 8.5 - 10.5 mg/dL Final   06/14/2021 9.5 8.5 - 10.1 mg/dL Final       Lab  Results   Component Value Date    WBC 4.9 07/21/2021    WBC 5.0 06/14/2021     Lab Results   Component Value Date    RBC 3.53 07/21/2021    RBC 3.96 06/14/2021     Lab Results   Component Value Date    HGB 11.5 07/21/2021    HGB 12.8 06/14/2021     Lab Results   Component Value Date    HCT 36.5 07/21/2021    HCT 39.3 06/14/2021     Lab Results   Component Value Date     07/21/2021    MCV 99 06/14/2021     Lab Results   Component Value Date    MCH 32.6 07/21/2021    MCH 32.3 06/14/2021     Lab Results   Component Value Date    MCHC 31.5 07/21/2021    MCHC 32.6 06/14/2021     Lab Results   Component Value Date    RDW 13.7 07/21/2021    RDW 14.2 06/14/2021     Lab Results   Component Value Date     07/21/2021     06/14/2021       ASSESSMENT/Plan:      ICD-10-CM    1. History of malignant neoplasm of anus  Z85.048    2. Status post fracture of right hip  Z87.81    3. Mild cognitive impairment  G31.84    4. Insomnia, unspecified type  G47.00    5. Atrophic vaginitis  N95.2      CHANGES:  None.    CARE PLAN:  The care plan has been reviewed and all orders signed.  Changes to care plan, if any, as noted.  Otherwise, continue current plan of care.    The above has been created using voice recognition software. Please be aware that this may unintentionally  produce inaccuracies and/or nonsensical sentences.      Electronically signed by: GRAHAM Trejo CNP

## 2021-09-22 ENCOUNTER — PATIENT OUTREACH (OUTPATIENT)
Dept: OTHER | Facility: CLINIC | Age: 81
End: 2021-09-22

## 2021-09-22 NOTE — PROGRESS NOTES
Community Paramedic Program Contact  [facility]/Voicemail    CP Community Health Worker Outreach  Outreach attempted x 1.  Left message on pt's friend Katherine's voicemail with call back information and requested return call.  Plan: Community Health Worker will try to reach patient again in 1-2 business days.

## 2021-09-24 ENCOUNTER — PATIENT OUTREACH (OUTPATIENT)
Dept: CARE COORDINATION | Facility: CLINIC | Age: 81
End: 2021-09-24

## 2021-09-24 NOTE — PROGRESS NOTES
Clinic Care Coordination Contact    Assessment: Care Coordinator contacted patient for 2 month follow up.  Patient has continued to follow the plan of care and assessment is negative for any new needs or concerns.    Enrollment status: Graduated.      Plan: No further outreaches at this time.  Patient will continue to follow the plan of care.  If new needs arise a new Care Coordination referral may be placed.  FYI to PCP    [facility] Care Coordination  Tel: [phone]

## 2021-09-27 NOTE — PROGRESS NOTES
"Community Paramedic Program Contact  [facility]/Voicemail    CP Community Health Worker Outreach  Outreach attempted x 1.  Left message on pt's friend Katherine's voicemail with call back information and requested return call.  Plan: Community Health Worker will try to reach patient again in 3-5 business days.    In her message, Katherine left questions regarding some of pt's \"services she's getting at the nursing home and whether or not Elizabeth is being too particular.\" She asked me to call her back and leave my availability, which I did. Thanked pt's friend for reaching out.    "

## 2021-10-04 DIAGNOSIS — S72.001A CLOSED FRACTURE OF NECK OF RIGHT FEMUR, INITIAL ENCOUNTER (H): ICD-10-CM

## 2021-10-04 RX ORDER — OXYCODONE HYDROCHLORIDE 5 MG/1
2.5 TABLET ORAL EVERY 6 HOURS PRN
Qty: 20 TABLET | Refills: 0 | Status: SHIPPED | OUTPATIENT
Start: 2021-10-04 | End: 2022-04-18

## 2021-10-05 ENCOUNTER — TELEPHONE (OUTPATIENT)
Dept: GERIATRICS | Facility: CLINIC | Age: 81
End: 2021-10-05

## 2021-10-05 NOTE — TELEPHONE ENCOUNTER
[facility] Nurse Triage Telephone Note    Provider: OLIVIA Helms  Facility: [facility]  Facility Type:  TCU    Caller: Eunice  Call Back Number: [phone]    Allergies:    Allergies   Allergen Reactions     Darvon [Propoxyphene]      Had reaction in teen years     Ketoconazole Rash     Penicillins Hives     As a child        Reason for call: Nurse called to report that Metrogel is not covered by insurance and patient would need to pay around $170 out of pocket.  Patient wants the gel discontinued as she cannot afford this and she will use the creams she has from home which includes aloe vera and cocoa butter.      Verbal Order/Direction given by Provider: Okay to discontinue Metrogel.    Provider giving Order:  OLIVIA Helms    Verbal Order given to: Eunice Floyd RN

## 2021-10-12 ENCOUNTER — LAB REQUISITION (OUTPATIENT)
Dept: LAB | Facility: CLINIC | Age: 81
End: 2021-10-12
Payer: MEDICARE

## 2021-10-12 DIAGNOSIS — I10 ESSENTIAL (PRIMARY) HYPERTENSION: ICD-10-CM

## 2021-10-13 LAB
ANION GAP SERPL CALCULATED.3IONS-SCNC: 10 MMOL/L (ref 5–18)
BUN SERPL-MCNC: 23 MG/DL (ref 8–28)
CALCIUM SERPL-MCNC: 9.4 MG/DL (ref 8.5–10.5)
CHLORIDE BLD-SCNC: 102 MMOL/L (ref 98–107)
CO2 SERPL-SCNC: 27 MMOL/L (ref 22–31)
CREAT SERPL-MCNC: 0.76 MG/DL (ref 0.6–1.1)
GFR SERPL CREATININE-BSD FRML MDRD: 73 ML/MIN/1.73M2
GLUCOSE BLD-MCNC: 80 MG/DL (ref 70–125)
POTASSIUM BLD-SCNC: 4.5 MMOL/L (ref 3.5–5)
SODIUM SERPL-SCNC: 139 MMOL/L (ref 136–145)

## 2021-10-13 PROCEDURE — 36415 COLL VENOUS BLD VENIPUNCTURE: CPT | Mod: ORL | Performed by: NURSE PRACTITIONER

## 2021-10-13 PROCEDURE — 80048 BASIC METABOLIC PNL TOTAL CA: CPT | Mod: ORL | Performed by: NURSE PRACTITIONER

## 2021-10-13 PROCEDURE — P9604 ONE-WAY ALLOW PRORATED TRIP: HCPCS | Mod: ORL | Performed by: NURSE PRACTITIONER

## 2021-10-30 VITALS
TEMPERATURE: 97.6 F | DIASTOLIC BLOOD PRESSURE: 77 MMHG | SYSTOLIC BLOOD PRESSURE: 106 MMHG | OXYGEN SATURATION: 95 % | BODY MASS INDEX: 20.96 KG/M2 | HEART RATE: 53 BPM | RESPIRATION RATE: 16 BRPM | WEIGHT: 122.8 LBS | HEIGHT: 64 IN

## 2021-10-30 ASSESSMENT — MIFFLIN-ST. JEOR: SCORE: 1002.02

## 2021-10-31 ENCOUNTER — NURSING HOME VISIT (OUTPATIENT)
Dept: GERIATRICS | Facility: CLINIC | Age: 81
End: 2021-10-31
Payer: MEDICARE

## 2021-10-31 DIAGNOSIS — S72.001A CLOSED FRACTURE OF NECK OF RIGHT FEMUR, INITIAL ENCOUNTER (H): ICD-10-CM

## 2021-10-31 DIAGNOSIS — N95.2 ATROPHIC VAGINITIS: ICD-10-CM

## 2021-10-31 DIAGNOSIS — Z96.649 HISTORY OF TOTAL HIP REPLACEMENT, UNSPECIFIED LATERALITY: ICD-10-CM

## 2021-10-31 DIAGNOSIS — C21.1 MALIGNANT NEOPLASM OF ANAL CANAL (H): Primary | ICD-10-CM

## 2021-10-31 DIAGNOSIS — G31.84 MILD COGNITIVE IMPAIRMENT: ICD-10-CM

## 2021-10-31 NOTE — LETTER
10/30/2021        RE: Elizabeth Taylor  [address] [address]         HEALTH GERIATRIC SERVICES  Chief Complaint   Patient presents with     [facility] Care Coordination - Regulatory     Multiple problems: Cervical cancer, hip fracture, falls, mild cognitive decline, chronic pain.     [facility] Medical Record Number:  [id number]  Place of Service where encounter took place:  [facility] ([facility]) [[id number]]    HPI:    Elizabeth Taylor  is 81 year old (1940), who is being seen today for a federally mandated E/M visit. Today's concerns are:  Doing well.  Some leg edema bilateral,.  Chronic pain: Better controlled.  On oxycodone.  Tylenol and gabapentin.  Can ambulate better.     Denies fever or chills. No cough or cp or sob. No LH or dizziness. No NVD. No dysuria or bowel complaint. No new sensory or motor complaint. No new rash.     ALLERGIES:Darvon [propoxyphene], Ketoconazole, and Penicillins  PAST MEDICAL HISTORY:   Past Medical History:   Diagnosis Date     Anal cancer (H)      Endometriosis, site unspecified      History of malignant neoplasm of anus 7/26/2021     Periprosthetic fracture of right hip, subsequent encounter 7/26/2021     Thyroiditis, unspecified     Thryoiditis-resolved     PAST SURGICAL HISTORY:   has a past surgical history that includes surgical history of - ; Colonoscopy (N/A, 4/13/2017); appendectomy; Insert port vascular access (Right, 2/8/2018); and Arthroplasty hip (Right, 7/26/2018).  FAMILY HISTORY: family history includes Cancer in her maternal grandmother and sister; Heart Disease in her father; Uterine Cancer in her niece.  SOCIAL HISTORY:  reports that she has never smoked. She has never used smokeless tobacco. She reports that she does not drink alcohol and does not use drugs.    MEDICATIONS:     Review of your medicines          Accurate as of October 31, 2021 11:59 PM. If you have any questions, ask your nurse or doctor.            CONTINUE  these medicines which have NOT CHANGED      Dose / Directions   acetaminophen 500 MG tablet  Commonly known as: TYLENOL      Dose: 1,000 mg  Take 1,000 mg by mouth 3 times daily  Refills: 0     Calcium-Magnesium-Zinc 333-133-5 MG Tabs per tablet      Dose: 1 tablet  Take 1 tablet by mouth daily  Refills: 0     cholecalciferol 125 mcg (5000 units) capsule  Commonly known as: VITAMIN D3  Used for: Age-related osteoporosis with current pathological fracture, initial encounter, Pathological fracture of pelvis, unspecified pathological cause, initial encounter      1 CAP BY MOUTH DAILY (DX: OSTEOPOROSIS)  Quantity: 90 capsule  Refills: 3     conjugated estrogens 0.625 MG/GM vaginal cream  Commonly known as: PREMARIN  Used for: Malignant neoplasm of anal canal (H), Atrophy of vulva      Apply locally to clitoral randall pea sized amount daily for 2 weeks then prn.  Quantity: 4 g  Refills: 0     docusate sodium 100 MG capsule  Commonly known as: COLACE  Used for: Drug-induced constipation      Dose: 100 mg  Take 1 capsule (100 mg) by mouth 2 times daily  Quantity: 60 capsule  Refills: 0     estradiol 0.1 MG/GM vaginal cream  Commonly known as: ESTRACE  Used for: Malignant neoplasm of anal canal (H), Atrophic vaginitis, Vaginal stenosis      Apply locally to clitoral randall pea sized amount daily for 2 weeks then prn.  Quantity: 42.5 g  Refills: 0     gabapentin 300 MG capsule  Commonly known as: NEURONTIN  Used for: Hip fracture, right, closed, initial encounter (H)      Dose: 300 mg  Take 1 capsule (300 mg) by mouth 3 times daily  Refills: 0     oxyCODONE 5 MG tablet  Commonly known as: ROXICODONE  Used for: Closed fracture of neck of right femur, initial encounter (H)      Dose: 2.5 mg  Take 0.5 tablets (2.5 mg) by mouth every 6 hours as needed for pain  Quantity: 20 tablet  Refills: 0     polyethylene glycol 17 GM/Dose powder  Commonly known as: MIRALAX  Used for: Hip fracture, right, closed, initial encounter (H)      Dose:  "17 g  Take 17 g by mouth daily  Quantity: 510 g  Refills: 0     vitamin B complex with vitamin C tablet      Dose: 1 tablet  Take 1 tablet by mouth daily  Refills: 0     VITAMIN E PO      Dose: 1 capsule  Take 1 capsule by mouth daily  Refills: 0           Case Management:  I have reviewed the care plan and MDS and do agree with the plan. Patient's desire to return to the community is not present. Information reviewed:  Medications, vital signs, orders, and nursing notes.    ROS:  4 point ROS including Respiratory, CV, GI and , other than that noted in the HPI,  is negative    Vitals:  /77   Pulse 53   Temp 97.6  F (36.4  C)   Resp 16   Ht 1.626 m (5' 4\")   Wt 55.7 kg (122 lb 12.8 oz)   LMP  (LMP Unknown)   SpO2 95%   BMI 21.08 kg/m    Body mass index is 21.08 kg/m .  Exam:    Limited exam, maintained social distancing as possible given COVID-19 Pandemic.     General: Awake, interactive, NAD  HEENT: AT/NC,  anicteric, Moist MM  Respi/Chest: Normal work of breathing.   CVS: S1 S2 regular.   Abd: Non distended.   Ext: Distally warm, well perfused. BL pedal edema.   Neuro: Alert, oriented x 3.   Skin: No obvious rash on exposed areas.   Psychiatry: Mood stable.     Lab/Diagnostic data:   Labs done in SNF are in [facility]. Please refer to them using [facility] Everywhere. and Recent labs in [facility] reviewed by me today.     ASSESSMENT/PLAN     Malignant neoplasm of anal canal (H)  Closed fracture of neck of right femur, initial encounter (H)  History of total hip replacement, unspecified laterality  Mild cognitive impairment  Atrophic vaginitis: On estrogen vaginal cream.  Chronic pain: Controlled.  Tylenol, gabapentin, oxycodone as needed along with bowel regimen.     Doing well.  No new concern  Changes: None.  Continue current care plan/medications.    Discussed with patient, nursing staff.    Electronically signed by:  Stoney Melendez MD            Sincerely,        Stoney Melendez MD      "

## 2021-11-12 NOTE — PROGRESS NOTES
[facility] GERIATRIC SERVICES  Chief Complaint   Patient presents with     [facility]VP Care Coordination - Regulatory     Multiple problems: Cervical cancer, hip fracture, falls, mild cognitive decline, chronic pain.     [place] Medical Record Number:  [id number]  Place of Service where encounter took place:  [facility] ([place]) [[id number]]    HPI:    Elizabeth Taylor  is 81 year old (1940), who is being seen today for a federally mandated E/M visit. Today's concerns are:  Doing well.  Some leg edema bilateral,.  Chronic pain: Better controlled.  On oxycodone.  Tylenol and gabapentin.  Can ambulate better.     Denies fever or chills. No cough or cp or sob. No LH or dizziness. No NVD. No dysuria or bowel complaint. No new sensory or motor complaint. No new rash.     ALLERGIES:Darvon [propoxyphene], Ketoconazole, and Penicillins  PAST MEDICAL HISTORY:   Past Medical History:   Diagnosis Date     Anal cancer (H)      Endometriosis, site unspecified      History of malignant neoplasm of anus 7/26/2021     Periprosthetic fracture of right hip, subsequent encounter 7/26/2021     Thyroiditis, unspecified     Thryoiditis-resolved     PAST SURGICAL HISTORY:   has a past surgical history that includes surgical history of - ; Colonoscopy (N/A, 4/13/2017); appendectomy; Insert port vascular access (Right, 2/8/2018); and Arthroplasty hip (Right, 7/26/2018).  FAMILY HISTORY: family history includes Cancer in her maternal grandmother and sister; Heart Disease in her father; Uterine Cancer in her niece.  SOCIAL HISTORY:  reports that she has never smoked. She has never used smokeless tobacco. She reports that she does not drink alcohol and does not use drugs.    MEDICATIONS:     Review of your medicines          Accurate as of October 31, 2021 11:59 PM. If you have any questions, ask your nurse or doctor.            CONTINUE these medicines which have NOT CHANGED      Dose / Directions   acetaminophen 500 MG tablet  Commonly  known as: TYLENOL      Dose: 1,000 mg  Take 1,000 mg by mouth 3 times daily  Refills: 0     Calcium-Magnesium-Zinc 333-133-5 MG Tabs per tablet      Dose: 1 tablet  Take 1 tablet by mouth daily  Refills: 0     cholecalciferol 125 mcg (5000 units) capsule  Commonly known as: VITAMIN D3  Used for: Age-related osteoporosis with current pathological fracture, initial encounter, Pathological fracture of pelvis, unspecified pathological cause, initial encounter      1 CAP BY MOUTH DAILY (DX: OSTEOPOROSIS)  Quantity: 90 capsule  Refills: 3     conjugated estrogens 0.625 MG/GM vaginal cream  Commonly known as: PREMARIN  Used for: Malignant neoplasm of anal canal (H), Atrophy of vulva      Apply locally to clitoral randall pea sized amount daily for 2 weeks then prn.  Quantity: 4 g  Refills: 0     docusate sodium 100 MG capsule  Commonly known as: COLACE  Used for: Drug-induced constipation      Dose: 100 mg  Take 1 capsule (100 mg) by mouth 2 times daily  Quantity: 60 capsule  Refills: 0     estradiol 0.1 MG/GM vaginal cream  Commonly known as: ESTRACE  Used for: Malignant neoplasm of anal canal (H), Atrophic vaginitis, Vaginal stenosis      Apply locally to clitoral randall pea sized amount daily for 2 weeks then prn.  Quantity: 42.5 g  Refills: 0     gabapentin 300 MG capsule  Commonly known as: NEURONTIN  Used for: Hip fracture, right, closed, initial encounter (H)      Dose: 300 mg  Take 1 capsule (300 mg) by mouth 3 times daily  Refills: 0     oxyCODONE 5 MG tablet  Commonly known as: ROXICODONE  Used for: Closed fracture of neck of right femur, initial encounter (H)      Dose: 2.5 mg  Take 0.5 tablets (2.5 mg) by mouth every 6 hours as needed for pain  Quantity: 20 tablet  Refills: 0     polyethylene glycol 17 GM/Dose powder  Commonly known as: MIRALAX  Used for: Hip fracture, right, closed, initial encounter (H)      Dose: 17 g  Take 17 g by mouth daily  Quantity: 510 g  Refills: 0     vitamin B complex with vitamin C  "tablet      Dose: 1 tablet  Take 1 tablet by mouth daily  Refills: 0     VITAMIN E PO      Dose: 1 capsule  Take 1 capsule by mouth daily  Refills: 0           Case Management:  I have reviewed the care plan and MDS and do agree with the plan. Patient's desire to return to the community is not present. Information reviewed:  Medications, vital signs, orders, and nursing notes.    ROS:  4 point ROS including Respiratory, CV, GI and , other than that noted in the HPI,  is negative    Vitals:  /77   Pulse 53   Temp 97.6  F (36.4  C)   Resp 16   Ht 1.626 m (5' 4\")   Wt 55.7 kg (122 lb 12.8 oz)   LMP  (LMP Unknown)   SpO2 95%   BMI 21.08 kg/m    Body mass index is 21.08 kg/m .  Exam:    Limited exam, maintained social distancing as possible given COVID-19 Pandemic.     General: Awake, interactive, NAD  HEENT: AT/NC,  anicteric, Moist MM  Respi/Chest: Normal work of breathing.   CVS: S1 S2 regular.   Abd: Non distended.   Ext: Distally warm, well perfused. BL pedal edema.   Neuro: Alert, oriented x 3.   Skin: No obvious rash on exposed areas.   Psychiatry: Mood stable.     Lab/Diagnostic data:   [facility] Please refer to them using [facility]Care Everywhere. and Recent labs in [facility] reviewed by me today.     ASSESSMENT/PLAN     Malignant neoplasm of anal canal (H)  Closed fracture of neck of right femur, initial encounter (H)  History of total hip replacement, unspecified laterality  Mild cognitive impairment  Atrophic vaginitis: On estrogen vaginal cream.  Chronic pain: Controlled.  Tylenol, gabapentin, oxycodone as needed along with bowel regimen.     Doing well.  No new concern  Changes: None.  Continue current care plan/medications.    Discussed with patient, nursing staff.    Electronically signed by:  Stoney Melendez MD      "

## 2021-11-29 ENCOUNTER — TELEPHONE (OUTPATIENT)
Dept: GERIATRICS | Facility: CLINIC | Age: 81
End: 2021-11-29
Payer: MEDICARE

## 2021-11-29 RX ORDER — DOCUSATE SODIUM 100 MG/1
100 CAPSULE, LIQUID FILLED ORAL 2 TIMES DAILY PRN
COMMUNITY
End: 2023-07-17

## 2021-11-29 NOTE — TELEPHONE ENCOUNTER
[facility] Nurse Triage Telephone Note    Provider: OLIVIA Helms  Facility: [facility]  Facility Type:  L[facility]    Caller: Nurse  Call Back Number:     Allergies:    Allergies   Allergen Reactions     Darvon [Propoxyphene]      Had reaction in teen years     Ketoconazole Rash     Penicillins Hives     As a child        Reason for call: Patient has been refusing the Colace for a while.  Can colace be changed to PRN?    Verbal Order/Direction given by Provider: Change Colace to BID PRN.    Provider giving Order:  OLIVIA Helms    Verbal Order given to: Nurse    Kandy Floyd RN

## 2022-01-19 NOTE — LETTER
"2018       RE: Elizabeth Taylor  [address] [address]     Dear Colleague,    Thank you for referring your patient, Elizabeth Taylor, to the [facility] COLON AND RECTAL SURGERY at [facility]. Please see a copy of my visit note below.    Colon and Rectal Surgery Follow-up Clinic Note      RE: Elizabeth Taylor  : 1940  [place]: 2018     DIAGNOSIS: mT2N1 anal canal squamous cell carcinoma (s/p CXRT [5,400 cGy], completed on 3/23/18).    INTERVAL HISTORY: Discharged from TCU. Slowly recovering after multiple falls/fractures and C diff diarrhea. Urinating spontaneously. Denies increased pain, fevers, or chills. Tolerating diet well. Having 2-3 BM's per day with improving fecal urgency but no incontinence. No BPR.      Physical Examination:  /66 (BP Location: Left arm, Patient Position: Chair, Cuff Size: Adult Regular)  Pulse 83  Temp 98.1  F (36.7  C) (Oral)  Ht 5' 5.98\"  Wt 120 lb 3.2 oz  SpO2 96%  BMI 19.41 kg/m2  Abdomen soft, NT. No inguinal adenopathy palpated.  Perianal skin with no more excoriation. Mild perianal hyperpigmentation and induration. No evidence of active infection.  BRITTNEY: no lesions palpated.  Anoscopy: Was performed.   Hemorrhoids: No significant internal hemorrhoids.  Lesions: No. White scar visualized at left anterior aspect of anal canal.    ASSESSMENT  Positive response to CXRT.     CEA: 2.0.    Interval imaging with PET-CT and MR pelvis on 18:    IMPRESSION:   In this patient with a history of anal cancer status post chemotherapy  and radiation:  1. Decrease in FDG uptake at the site of the primary anal mass. The  mass is poorly defined on the accompanying CT. This is consistent with  response to therapy.  2. No significant residual increased FDG uptake in the left inguinal  nodes.  3. Small focus of uptake adjacent to the urethra without an  accompanying lesion on the CT. This is most suspicious " for a urethral  contamination, possibly within a urethral diverticulum or a small  amount of retained excreted urine tracking up into the vaginal fornix.  Recommend attention on follow-up imaging, although alternatively a  repeat MRI of the pelvis may help increase confidence that this  finding does not represent small lesion.  4. Stable sub-5 mm indeterminate pulmonary nodules.  5. Improvement in trace amount of infiltrative free fluid in the  pelvis.  6. Decrease in now small right greater than left pleural effusions,  with resolution of the previously visualized right pneumothorax.  7. Incidental findings include chronic healing rib fractures,  asymmetric right paraspinal muscular uptake, cholelithiasis, and  dilated lower extremity superficial veins.  8. Redemonstration of the low-density cyst in the left adnexa likely  an ovarian cyst, without increased FDG uptake. Continued attention on  follow-up imaging is recommended.    IMPRESSION:   1. Interval near resolution of the patient's known left anal mass  lesion.  2. No evidence for recurrent or metastatic disease in the pelvis.  2a. Please also refer to same day PET/CT exam report.  3. Stable left ovarian cysts. Recommend monitoring at least on annual  basis (on routine anal cancer restaging follow-up or ultrasound pelvis  if at a later date restaging is no longer required).    PLAN  1. RTC in 6 months for MR and repeat anorectal exam and anoscopy.  2. Rest of surveillance per medical oncology.    Time spent: 30 minutes.  >50% spent in discussing, counseling and coordinating care.    Referring Provider:  Felisha Davis NP  [address] [place] [address]     CC:  MD Zaire Mcleod MD Wolfgang B. Gaertner, M.D., M.Sc.     Division of Colon and Rectal Surgery  [facility]         n/a

## 2022-01-24 ENCOUNTER — NURSING HOME VISIT (OUTPATIENT)
Dept: GERIATRICS | Facility: CLINIC | Age: 82
End: 2022-01-24
Payer: MEDICARE

## 2022-01-24 VITALS
SYSTOLIC BLOOD PRESSURE: 139 MMHG | WEIGHT: 129.8 LBS | TEMPERATURE: 96.4 F | HEART RATE: 72 BPM | OXYGEN SATURATION: 97 % | DIASTOLIC BLOOD PRESSURE: 74 MMHG | HEIGHT: 64 IN | RESPIRATION RATE: 20 BRPM | BODY MASS INDEX: 22.16 KG/M2

## 2022-01-24 DIAGNOSIS — F51.04 PSYCHOPHYSIOLOGICAL INSOMNIA: ICD-10-CM

## 2022-01-24 DIAGNOSIS — N95.2 ATROPHIC VAGINITIS: ICD-10-CM

## 2022-01-24 DIAGNOSIS — Z85.048 HISTORY OF MALIGNANT NEOPLASM OF ANUS: ICD-10-CM

## 2022-01-24 DIAGNOSIS — Z96.649 HISTORY OF TOTAL HIP REPLACEMENT, UNSPECIFIED LATERALITY: ICD-10-CM

## 2022-01-24 DIAGNOSIS — G31.84 MILD COGNITIVE IMPAIRMENT: Primary | ICD-10-CM

## 2022-01-24 PROCEDURE — 99207 PR NO BILLABLE SERVICE THIS VISIT: CPT | Performed by: NURSE PRACTITIONER

## 2022-01-24 ASSESSMENT — MIFFLIN-ST. JEOR: SCORE: 1033.77

## 2022-01-24 NOTE — LETTER
"    2022        RE: Elizabeth Taylor  [address] [facility] [address]        [facility]    Name:   Elizabeth Taylor  :   1940  MRN:    [id number]     Facility:   [facility] [[id number]]   Room: [place] [address]  Code Status: FULL CODE and POLST AVAILABLE -     DOS: 2022  Previous visit: 2021    PCP:  Jann Henderson    CHIEF COMPLAINT / REASON FOR VISIT:  Chief Complaint   Patient presents with     FVP Care Coordination - Home Visit-Annual Assessment      [facility] from 2021 until 2021 (right periprosthetic hip fracture)  [facility]CU from 2021 until 2021 (transfer from TCU to board and care)      HPI: Elizabeth is an 81 year old female with a past medical history significant for 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  She had tried managing right hip pain as an outpatient, but it soon became unbearable.  X-rays were performed at the direction of her PCP, and they showed \"suspicion of fracture\" of the right hip.  In the ED, a CT showed a periprosthetic fracture.  Orthopedics evaluated patient and advised that she could be weightbearing as tolerated with assistant without the need for surgical intervention.  She was admitted to the ED observation unit and was not treated with scheduled acetaminophen, gabapentin, cyclobenzaprine, Lidoderm patch, and as needed oxycodone.  Her pain was well controlled with these measures.  However, she did have some confusion, and the patient was discussed with her sister, Sahil.  Sahil confirmed that the patient has baseline memory problems.  Patient and ultimately the TCU were advised to only use " "oxycodone for severe breakthrough pain, as there is concern of worsening her confusion.  Prior to discharge, the cyclobenzaprine was discontinued, and he was discharged to the TCU on the remaining medications.  She can be weightbearing as tolerated with a walker and should follow-up with Dr. Phan in 6 weeks for repeat x-rays.      CURRENT/RECENT ISSUES    Disposition:   She is thriving in the board and care environment.  While in the TCU, she indicated she would like to stay here.  \"This is the best place in the world for me.  I want to get well, but I don't want to leave here,\" she said, and she subsequently moved into [facility] on the board and care floor, and she just loves the room and the window view. We discussed her full code status at my last visit, and she expressed the opinion that she would want to be revived, saying, \"right now, my heart feels very strong.\"  She also complained of \"blindly taking all these pills,\" and she was refusing vitamin E and B complex.  We talked about this and agreed to discuss it again later.  Today's visit involved her telling me all the things she has been doing and how much she enjoys it here.  Her enthusiasm is similar at each encounter.      She has gained weight (\"I went from 119-132\") and wants to \"cut down.\"  She can be stubborn at times.  Currently, she is not refusing food.  She also tells me, \"my mid of session is my back is so weak.\"    Anal cancer: She had a visit on 08/27/2021 with [facility] (Dr. Zaire Blanca).  It was noted that she is 3.5 years out from the completion of chemoradiotherapy and remains radiologically MARGARET.  She is to follow-up with the oncology NP in 12 months and as needed thereafter.    Cognition: She is very cogent this morning but, at the initial visit, she admitted to some confusion, stating that she did not know where she was.  Speech/thoughts tended to be rambling and disjointed.  I requested OT or " "SLP to perform cognitive assessments which clearly indicated that assisted living or long-term care would be appropriate and that she requires 24-hour care.    Insomnia: She had been unable to sleep.  She told me that it had been \"catastrophic the first night.\"  We recently added 25 mg of trazodone at bedtime, later increasing that to 50 mg, and it appeared to be working.  She has describes, however, years worth of \"flexible sleeping.\"  Ultimately, she requested that the trazodone be discontinued, as she felt it made her confused and incontinent during the night.  She tells me she has not slept for years, surviving on just a \"couple hours at a time.\".      ROS: No headaches or chest pains, coughing or congestion, nausea or vomiting, dizziness or dyspnea, dysuria, difficulty chewing or swallowing, integumentary issues, or problems with appetite.  The patient and nursing staff indicate that she is not sleeping.  She stated that it's normal for her.  She goes to the a very, then to a nearby couch, does \"a bunch of exercises\" and then sleeps there.  She says she doesn't sleep on the bed.  Why?  \"It could be a psychological thing,\" she says.    Past Medical History:   Diagnosis Date     Anal cancer (H)      Endometriosis, site unspecified      History of malignant neoplasm of anus 7/26/2021     Periprosthetic fracture of right hip, subsequent encounter 7/26/2021     Thyroiditis, unspecified     Thryoiditis-resolved              Family History   Problem Relation Age of Onset     Cancer Sister      Cancer Maternal Grandmother         lymphoma     Heart Disease Father         MI     Uterine Cancer Niece      Social History     Socioeconomic History     Marital status: Single     Spouse name: None     Number of children: None     Years of education: None     Highest education level: None   Occupational History     None   Tobacco Use     Smoking status: Never Smoker     Smokeless tobacco: Never Used   Substance and Sexual " Activity     Alcohol use: No     Drug use: No     Sexual activity: Yes     Partners: Male   Other Topics Concern     Parent/sibling w/ CABG, MI or angioplasty before 65F 55M? Not Asked   Social History Narrative     None     Social Determinants of Health     Financial Resource Strain: Medium Risk     Difficulty of Paying Living Expenses: Somewhat hard   Food Insecurity: No Food Insecurity     Worried About Running Out of Food in the Last Year: Never true     Ran Out of Food in the Last Year: Never true   Transportation Needs: No Transportation Needs     Lack of Transportation (Medical): No     Lack of Transportation (Non-Medical): No   Physical Activity: Insufficiently Active     Days of Exercise per Week: 5 days     Minutes of Exercise per Session: 10 min   Stress: Stress Concern Present     Feeling of Stress : To some extent   Social Connections:      Frequency of Communication with Friends and Family:      Frequency of Social Gatherings with Friends and Family:      Attends [employer] Services:      Active Member of Clubs or Organizations:      Attends Club or Organization Meetings:      Marital Status:    Intimate Partner Violence:      Fear of Current or Ex-Partner:      Emotionally Abused:      Physically Abused:      Sexually Abused:        MEDICATIONS: Reviewed from the MAR, physician orders, and/or earlier progress notes.    Current Outpatient Medications   Medication Sig     acetaminophen (TYLENOL) 500 MG tablet Take 1,000 mg by mouth 3 times daily     Calcium-Magnesium-Zinc 333-133-5 MG TABS per tablet Take 1 tablet by mouth daily     cholecalciferol (VITAMIN D3) 5000 units (125 mcg) capsule 1 CAP BY MOUTH DAILY (DX: OSTEOPOROSIS)     conjugated estrogens (PREMARIN) 0.625 MG/GM vaginal cream Apply locally to clitoral randall pea sized amount daily for 2 weeks then prn. (Patient not taking: Reported on 6/28/2021)     docusate sodium (COLACE) 100 MG capsule Take 100 mg by mouth 2 times daily as needed for  "constipation     estradiol (ESTRACE) 0.1 MG/GM vaginal cream Apply locally to clitoral randall pea sized amount daily for 2 weeks then prn. (Patient not taking: Reported on 6/28/2021)     gabapentin (NEURONTIN) 300 MG capsule Take 1 capsule (300 mg) by mouth 3 times daily     oxyCODONE (ROXICODONE) 5 MG tablet Take 0.5 tablets (2.5 mg) by mouth every 6 hours as needed for pain     polyethylene glycol (MIRALAX) 17 GM/Dose powder Take 17 g by mouth daily     vitamin B complex with vitamin C (VITAMIN  B COMPLEX) TABS tablet Take 1 tablet by mouth daily     VITAMIN E PO Take 1 capsule by mouth daily     Current Facility-Administered Medications   Medication     heparin 100 UNIT/ML injection 500 Units     ALLERGIES:   Allergies   Allergen Reactions     Darvon [Propoxyphene]      Had reaction in teen years     Ketoconazole Rash     Penicillins Hives     As a child     DIET: Regular, regular texture, thin liquids.    Vitals:    01/24/22 1417   BP: 139/74   Pulse: 72   Resp: 20   Temp: (!) 96.4  F (35.8  C)   SpO2: 97%   Weight: 58.9 kg (129 lb 12.8 oz)   Height: 1.626 m (5' 4\")     Body mass index is 22.28 kg/m .    EXAMINATION:   General: Pleasant, extremely loquacious (and rather funny), frail-appearing elderly female, ambulating down the guaman and stopping at the window to look outside.  There is lots of stuff all over the room and locks on her bed, mostly books.  She goes on tangents from a 1 subject to another with barely a breath in between.  Head: Normocephalic and atraumatic.   Eyes: PERRLA, sclerae clear.  Eyes are a bit red rimmed.  ENT: Moist oral mucosa.  She has many of her own teeth.  No rhinorrhea or nasal discharge.  Hearing appears to be quite good.  Cardiovascular: Regular rate and rhythm with intermittent ectopy and no appreciable murmur.   Respiratory: Lungs clear to auscultation bilaterally.   Abdomen: Nondistended.   Musculoskeletal/Extremities: Age-related degenerative joint disease.  The right leg is \"2 " "inches shorter\" than the left.  Bilateral moderate to severe hallux valgus deformities and hammertoes.  Currently, there is increased lower extremity edema (1-2+ right, 2-3+ left).  Port-A-Cath in the right upper chest wall.  Integument: Hypertrophic lower extremity skin from venous stasis, especially the left leg.  Otherwise, no rashes, clinically significant lesions, or skin breakdown.   Cognitive/Psychiatric: Clearly trying to have some cognitive deficits, though affect is quite upbeat.    DIAGNOSTICS:   Last Comprehensive Metabolic Panel:  Sodium   Date Value Ref Range Status   10/13/2021 139 136 - 145 mmol/L Final   06/14/2021 141 133 - 144 mmol/L Final     Potassium   Date Value Ref Range Status   10/13/2021 4.5 3.5 - 5.0 mmol/L Final   06/14/2021 3.7 3.4 - 5.3 mmol/L Final     Chloride   Date Value Ref Range Status   10/13/2021 102 98 - 107 mmol/L Final   06/14/2021 106 94 - 109 mmol/L Final     Carbon Dioxide   Date Value Ref Range Status   06/14/2021 31 20 - 32 mmol/L Final     Carbon Dioxide (CO2)   Date Value Ref Range Status   10/13/2021 27 22 - 31 mmol/L Final     Anion Gap   Date Value Ref Range Status   10/13/2021 10 5 - 18 mmol/L Final   06/14/2021 4 3 - 14 mmol/L Final     Glucose   Date Value Ref Range Status   10/13/2021 80 70 - 125 mg/dL Final   06/14/2021 66 (L) 70 - 99 mg/dL Final     Urea Nitrogen   Date Value Ref Range Status   10/13/2021 23 8 - 28 mg/dL Final   06/14/2021 24 7 - 30 mg/dL Final     Creatinine   Date Value Ref Range Status   10/13/2021 0.76 0.60 - 1.10 mg/dL Final   06/14/2021 0.70 0.52 - 1.04 mg/dL Final     GFR Estimate   Date Value Ref Range Status   10/13/2021 73 >60 mL/min/1.73m2 Final     Comment:     As of July 11, 2021, eGFR is calculated by the CKD-EPI creatinine equation, without race adjustment. eGFR can be influenced by muscle mass, exercise, and diet. The reported eGFR is an estimation only and is only applicable if the renal function is stable.   06/14/2021 80 " >60 mL/min/[1.73_m2] Final     Comment:     Non  GFR Calc  Starting 12/18/2018, serum creatinine based estimated GFR (eGFR) will be   calculated using the Chronic Kidney Disease Epidemiology Collaboration   (CKD-EPI) equation.       Calcium   Date Value Ref Range Status   10/13/2021 9.4 8.5 - 10.5 mg/dL Final   06/14/2021 9.5 8.5 - 10.1 mg/dL Final       Lab Results   Component Value Date    WBC 4.9 07/21/2021    WBC 5.0 06/14/2021     Lab Results   Component Value Date    RBC 3.53 07/21/2021    RBC 3.96 06/14/2021     Lab Results   Component Value Date    HGB 11.5 07/21/2021    HGB 12.8 06/14/2021     Lab Results   Component Value Date    HCT 36.5 07/21/2021    HCT 39.3 06/14/2021     Lab Results   Component Value Date     07/21/2021    MCV 99 06/14/2021     Lab Results   Component Value Date    MCH 32.6 07/21/2021    MCH 32.3 06/14/2021     Lab Results   Component Value Date    MCHC 31.5 07/21/2021    MCHC 32.6 06/14/2021     Lab Results   Component Value Date    RDW 13.7 07/21/2021    RDW 14.2 06/14/2021     Lab Results   Component Value Date     07/21/2021     06/14/2021       ASSESSMENT/Plan:      ICD-10-CM    1. Mild cognitive impairment  G31.84    2. History of malignant neoplasm of anus  Z85.048    3. History of total hip replacement, unspecified laterality  Z96.649    4. Psychophysiological insomnia  F51.04    5. Atrophic vaginitis  N95.2        CASE MANAGEMENT:  I have reviewed the facility/SNF care plan/MDS which was done recently, including the falls risk, nutrition and pain screening. I also reviewed the current immunizations, and preventive care..Future cancer screening indicated is ongoing, given her history of anal cancer, and provider and pt will formulate a POC. Patient's desire to return to the community is not present. Current Level of Care is appropriate.    Advance Directive Discussion:    I reviewed the current advanced directives as reflected in EPIC, the  POLST and the facility chart, and verified the congruency of orders.  I did review the advance directives with the resident.     Team Discussion:  I communicated with the appropriate disciplines involved with the Plan of Care:   Nursing      Patient Goal:  Patient's goal is pain control and comfort.    Information reviewed:  Medications, vital signs, orders, and nursing notes.      CHANGES:  None.    CARE PLAN:  The care plan has been reviewed and all orders signed.  Changes to care plan, if any, as noted.  Otherwise, continue current plan of care.    The above has been created using voice recognition software. Please be aware that this may unintentionally  produce inaccuracies and/or nonsensical sentences.      Electronically signed by: GRAHAM Trejo CNP        Sincerely,        GRAHAM Trejo CNP

## 2022-02-01 NOTE — PROGRESS NOTES
"[facility] Geriatrics    Name:   Elizabeth Taylor  :   1940  MRN:    [id number]     Facility:   [facility] ([facility] [[id number]]   Room: [facility] 115  Code Status: FULL CODE and POLST AVAILABLE -     DOS: 2022  Previous visit: 2021    PCP:  Jann Henderson    CHIEF COMPLAINT / REASON FOR VISIT:  Chief Complaint   Patient presents with     FVP Care Coordination - Home Visit-Annual Assessment      [facility] from 2021 until 2021 (right periprosthetic hip fracture)  [facility] TCU from 2021 until 2021 (transfer from TCU to board and care)      HPI: Elizabeth is an 81 year old female with a past medical history significant for 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  She had tried managing right hip pain as an outpatient, but it soon became unbearable.  X-rays were performed at the direction of her PCP, and they showed \"suspicion of fracture\" of the right hip.  In the ED, a CT showed a periprosthetic fracture.  Orthopedics evaluated patient and advised that she could be weightbearing as tolerated with assistant without the need for surgical intervention.  She was admitted to the ED observation unit and was not treated with scheduled acetaminophen, gabapentin, cyclobenzaprine, Lidoderm patch, and as needed oxycodone.  Her pain was well controlled with these measures.  However, she did have some confusion, and the patient was discussed with her sister, Sahil.  Sahil confirmed that the patient has baseline memory problems.  Patient and ultimately the TCU were advised to only use oxycodone for severe breakthrough pain, as there is concern of worsening her confusion.  Prior to " "discharge, the cyclobenzaprine was discontinued, and he was discharged to the TCU on the remaining medications.  She can be weightbearing as tolerated with a walker and should follow-up with Dr. Phan in 6 weeks for repeat x-rays.      CURRENT/RECENT ISSUES    Disposition:   She is thriving in the board and care environment.  While in the TCU, she indicated she would like to stay here.  \"This is the best place in the world for me.  I want to get well, but I don't want to leave here,\" she said, and she subsequently moved into [facility] on the board and care floor, and she just loves the room and the window view. We discussed her full code status at my last visit, and she expressed the opinion that she would want to be revived, saying, \"right now, my heart feels very strong.\"  She also complained of \"blindly taking all these pills,\" and she was refusing vitamin E and B complex.  We talked about this and agreed to discuss it again later.  Today's visit involved her telling me all the things she has been doing and how much she enjoys it here.  Her enthusiasm is similar at each encounter.      She has gained weight (\"I went from 119-132\") and wants to \"cut down.\"  She can be stubborn at times.  Currently, she is not refusing food.  She also tells me, \"my mid of session is my back is so weak.\"    Anal cancer: She had a visit on 08/27/2021 with [facility] (Dr. Zaire Blanca).  It was noted that she is 3.5 years out from the completion of chemoradiotherapy and remains radiologically MARGARET.  She is to follow-up with the oncology NP in 12 months and as needed thereafter.    Cognition: She is very cogent this morning but, at the initial visit, she admitted to some confusion, stating that she did not know where she was.  Speech/thoughts tended to be rambling and disjointed.  I requested OT or SLP to perform cognitive assessments which clearly indicated that assisted living or long-term care " "would be appropriate and that she requires 24-hour care.    Insomnia: She had been unable to sleep.  She told me that it had been \"catastrophic the first night.\"  We recently added 25 mg of trazodone at bedtime, later increasing that to 50 mg, and it appeared to be working.  She has describes, however, years worth of \"flexible sleeping.\"  Ultimately, she requested that the trazodone be discontinued, as she felt it made her confused and incontinent during the night.  She tells me she has not slept for years, surviving on just a \"couple hours at a time.\".      ROS: No headaches or chest pains, coughing or congestion, nausea or vomiting, dizziness or dyspnea, dysuria, difficulty chewing or swallowing, integumentary issues, or problems with appetite.  The patient and nursing staff indicate that she is not sleeping.  She stated that it's normal for her.  She goes to the a very, then to a nearby couch, does \"a bunch of exercises\" and then sleeps there.  She says she doesn't sleep on the bed.  Why?  \"It could be a psychological thing,\" she says.    Past Medical History:   Diagnosis Date     Anal cancer (H)      Endometriosis, site unspecified      History of malignant neoplasm of anus 7/26/2021     Periprosthetic fracture of right hip, subsequent encounter 7/26/2021     Thyroiditis, unspecified     Thryoiditis-resolved              Family History   Problem Relation Age of Onset     Cancer Sister      Cancer Maternal Grandmother         lymphoma     Heart Disease Father         MI     Uterine Cancer Niece      Social History     Socioeconomic History     Marital status: Single     Spouse name: None     Number of children: None     Years of education: None     Highest education level: None   Occupational History     None   Tobacco Use     Smoking status: Never Smoker     Smokeless tobacco: Never Used   Substance and Sexual Activity     Alcohol use: No     Drug use: No     Sexual activity: Yes     Partners: Male   Other " Topics Concern     Parent/sibling w/ CABG, MI or angioplasty before 65F 55M? Not Asked   Social History Narrative     None     Social Determinants of Health     Financial Resource Strain: Medium Risk     Difficulty of Paying Living Expenses: Somewhat hard   Food Insecurity: No Food Insecurity     Worried About Running Out of Food in the Last Year: Never true     Ran Out of Food in the Last Year: Never true   Transportation Needs: No Transportation Needs     Lack of Transportation (Medical): No     Lack of Transportation (Non-Medical): No   Physical Activity: Insufficiently Active     Days of Exercise per Week: 5 days     Minutes of Exercise per Session: 10 min   Stress: Stress Concern Present     Feeling of Stress : To some extent   Social Connections:      Frequency of Communication with Friends and Family:      Frequency of Social Gatherings with Friends and Family:      Attends [employer] Services:      Active Member of Clubs or Organizations:      Attends Club or Organization Meetings:      Marital Status:    Intimate Partner Violence:      Fear of Current or Ex-Partner:      Emotionally Abused:      Physically Abused:      Sexually Abused:        MEDICATIONS: Reviewed from the MAR, physician orders, and/or earlier progress notes.    Current Outpatient Medications   Medication Sig     acetaminophen (TYLENOL) 500 MG tablet Take 1,000 mg by mouth 3 times daily     Calcium-Magnesium-Zinc 333-133-5 MG TABS per tablet Take 1 tablet by mouth daily     cholecalciferol (VITAMIN D3) 5000 units (125 mcg) capsule 1 CAP BY MOUTH DAILY (DX: OSTEOPOROSIS)     conjugated estrogens (PREMARIN) 0.625 MG/GM vaginal cream Apply locally to clitoral randall pea sized amount daily for 2 weeks then prn. (Patient not taking: Reported on 6/28/2021)     docusate sodium (COLACE) 100 MG capsule Take 100 mg by mouth 2 times daily as needed for constipation     estradiol (ESTRACE) 0.1 MG/GM vaginal cream Apply locally to clitoral randall pea sized  "amount daily for 2 weeks then prn. (Patient not taking: Reported on 6/28/2021)     gabapentin (NEURONTIN) 300 MG capsule Take 1 capsule (300 mg) by mouth 3 times daily     oxyCODONE (ROXICODONE) 5 MG tablet Take 0.5 tablets (2.5 mg) by mouth every 6 hours as needed for pain     polyethylene glycol (MIRALAX) 17 GM/Dose powder Take 17 g by mouth daily     vitamin B complex with vitamin C (VITAMIN  B COMPLEX) TABS tablet Take 1 tablet by mouth daily     VITAMIN E PO Take 1 capsule by mouth daily     Current Facility-Administered Medications   Medication     heparin 100 UNIT/ML injection 500 Units     ALLERGIES:   Allergies   Allergen Reactions     Darvon [Propoxyphene]      Had reaction in teen years     Ketoconazole Rash     Penicillins Hives     As a child     DIET: Regular, regular texture, thin liquids.    Vitals:    01/24/22 1417   BP: 139/74   Pulse: 72   Resp: 20   Temp: (!) 96.4  F (35.8  C)   SpO2: 97%   Weight: 58.9 kg (129 lb 12.8 oz)   Height: 1.626 m (5' 4\")     Body mass index is 22.28 kg/m .    EXAMINATION:   General: Pleasant, extremely loquacious (and rather funny), frail-appearing elderly female, ambulating down the guaman and stopping at the window to look outside.  There is lots of stuff all over the room and locks on her bed, mostly books.  She goes on tangents from a 1 subject to another with barely a breath in between.  Head: Normocephalic and atraumatic.   Eyes: PERRLA, sclerae clear.  Eyes are a bit red rimmed.  ENT: Moist oral mucosa.  She has many of her own teeth.  No rhinorrhea or nasal discharge.  Hearing appears to be quite good.  Cardiovascular: Regular rate and rhythm with intermittent ectopy and no appreciable murmur.   Respiratory: Lungs clear to auscultation bilaterally.   Abdomen: Nondistended.   Musculoskeletal/Extremities: Age-related degenerative joint disease.  The right leg is \"2 inches shorter\" than the left.  Bilateral moderate to severe hallux valgus deformities and " carmen.  Currently, there is increased lower extremity edema (1-2+ right, 2-3+ left).  Port-A-Cath in the right upper chest wall.  Integument: Hypertrophic lower extremity skin from venous stasis, especially the left leg.  Otherwise, no rashes, clinically significant lesions, or skin breakdown.   Cognitive/Psychiatric: Clearly trying to have some cognitive deficits, though affect is quite upbeat.    DIAGNOSTICS:   Last Comprehensive Metabolic Panel:  Sodium   Date Value Ref Range Status   10/13/2021 139 136 - 145 mmol/L Final   06/14/2021 141 133 - 144 mmol/L Final     Potassium   Date Value Ref Range Status   10/13/2021 4.5 3.5 - 5.0 mmol/L Final   06/14/2021 3.7 3.4 - 5.3 mmol/L Final     Chloride   Date Value Ref Range Status   10/13/2021 102 98 - 107 mmol/L Final   06/14/2021 106 94 - 109 mmol/L Final     Carbon Dioxide   Date Value Ref Range Status   06/14/2021 31 20 - 32 mmol/L Final     Carbon Dioxide (CO2)   Date Value Ref Range Status   10/13/2021 27 22 - 31 mmol/L Final     Anion Gap   Date Value Ref Range Status   10/13/2021 10 5 - 18 mmol/L Final   06/14/2021 4 3 - 14 mmol/L Final     Glucose   Date Value Ref Range Status   10/13/2021 80 70 - 125 mg/dL Final   06/14/2021 66 (L) 70 - 99 mg/dL Final     Urea Nitrogen   Date Value Ref Range Status   10/13/2021 23 8 - 28 mg/dL Final   06/14/2021 24 7 - 30 mg/dL Final     Creatinine   Date Value Ref Range Status   10/13/2021 0.76 0.60 - 1.10 mg/dL Final   06/14/2021 0.70 0.52 - 1.04 mg/dL Final     GFR Estimate   Date Value Ref Range Status   10/13/2021 73 >60 mL/min/1.73m2 Final     Comment:     As of July 11, 2021, eGFR is calculated by the CKD-EPI creatinine equation, without race adjustment. eGFR can be influenced by muscle mass, exercise, and diet. The reported eGFR is an estimation only and is only applicable if the renal function is stable.   06/14/2021 80 >60 mL/min/[1.73_m2] Final     Comment:     Non  GFR Calc  Starting  12/18/2018, serum creatinine based estimated GFR (eGFR) will be   calculated using the Chronic Kidney Disease Epidemiology Collaboration   (CKD-EPI) equation.       Calcium   Date Value Ref Range Status   10/13/2021 9.4 8.5 - 10.5 mg/dL Final   06/14/2021 9.5 8.5 - 10.1 mg/dL Final       Lab Results   Component Value Date    WBC 4.9 07/21/2021    WBC 5.0 06/14/2021     Lab Results   Component Value Date    RBC 3.53 07/21/2021    RBC 3.96 06/14/2021     Lab Results   Component Value Date    HGB 11.5 07/21/2021    HGB 12.8 06/14/2021     Lab Results   Component Value Date    HCT 36.5 07/21/2021    HCT 39.3 06/14/2021     Lab Results   Component Value Date     07/21/2021    MCV 99 06/14/2021     Lab Results   Component Value Date    MCH 32.6 07/21/2021    MCH 32.3 06/14/2021     Lab Results   Component Value Date    MCHC 31.5 07/21/2021    MCHC 32.6 06/14/2021     Lab Results   Component Value Date    RDW 13.7 07/21/2021    RDW 14.2 06/14/2021     Lab Results   Component Value Date     07/21/2021     06/14/2021       ASSESSMENT/Plan:      ICD-10-CM    1. Mild cognitive impairment  G31.84    2. History of malignant neoplasm of anus  Z85.048    3. History of total hip replacement, unspecified laterality  Z96.649    4. Psychophysiological insomnia  F51.04    5. Atrophic vaginitis  N95.2        CASE MANAGEMENT:  I have reviewed the facility/SNF care plan/MDS which was done recently, including the falls risk, nutrition and pain screening. I also reviewed the current immunizations, and preventive care..Future cancer screening indicated is ongoing, given her history of anal cancer, and provider and pt will formulate a POC. Patient's desire to return to the community is not present. Current Level of Care is appropriate.    Advance Directive Discussion:    I reviewed the current advanced directives as reflected in EPIC, the POLST and the facility chart, and verified the congruency of orders.  I did review  the advance directives with the resident.     Team Discussion:  I communicated with the appropriate disciplines involved with the Plan of Care:   Nursing      Patient Goal:  Patient's goal is pain control and comfort.    Information reviewed:  Medications, vital signs, orders, and nursing notes.      CHANGES:  None.    CARE PLAN:  The care plan has been reviewed and all orders signed.  Changes to care plan, if any, as noted.  Otherwise, continue current plan of care.    The above has been created using voice recognition software. Please be aware that this may unintentionally  produce inaccuracies and/or nonsensical sentences.      Electronically signed by: GRAHAM Trejo CNP

## 2022-02-17 PROBLEM — N95.2 ATROPHIC VAGINITIS: Status: ACTIVE | Noted: 2019-03-07

## 2022-02-24 ENCOUNTER — TELEPHONE (OUTPATIENT)
Dept: GERIATRICS | Facility: CLINIC | Age: 82
End: 2022-02-24
Payer: MEDICARE

## 2022-02-24 NOTE — TELEPHONE ENCOUNTER
[facility] Geriatrics Triage Nurse Telephone Encounter    Provider: OLIVIA Helms  Facility: [facility]  Facility Type:  LTC    Caller: Cyndi   Call Back Number: [phone]    Allergies:    Allergies   Allergen Reactions     Darvon [Propoxyphene]      Had reaction in teen years     Ketoconazole Rash     Penicillins Hives     As a child        Reason for call: Pt has 2 small areas on the top of her thigh/groin area that look to be a possible blister or pimple, the top of the area is white in color, no pain, no redness, the pt is wondering if they could be related to her cancer. The area is intact at this time.     Verbal Order/Direction given by Provider: Monitor the area daily and call with any changes, NP to see when in the building.     Provider giving Order:  OLIVIA Helms    Verbal Order given to: Cyndi Owens RN

## 2022-03-09 NOTE — NURSING NOTE
"Oncology Rooming Note    November 26, 2018 1:16 PM   Elizabeth Taylor is a 78 year old female who presents for:    Chief Complaint   Patient presents with     Port Draw     labs drawn from port by rn.  vs taken     Oncology Clinic Visit     Return visit related to Anal Cancer     Initial Vitals: /73 (BP Location: Right arm, Patient Position: Sitting, Cuff Size: Adult Regular)  Pulse 69  Temp 98.5  F (36.9  C) (Oral)  Resp 16  Ht 1.626 m (5' 4.02\")  Wt 55.2 kg (121 lb 11.2 oz)  LMP  (LMP Unknown)  SpO2 96%  BMI 20.88 kg/m2 Estimated body mass index is 20.88 kg/(m^2) as calculated from the following:    Height as of this encounter: 1.626 m (5' 4.02\").    Weight as of this encounter: 55.2 kg (121 lb 11.2 oz). Body surface area is 1.58 meters squared.  Mild Pain (3) Comment: Data Unavailable   No LMP recorded (lmp unknown). Patient is postmenopausal.  Allergies reviewed: Yes  Medications reviewed: Yes    Medications: Medication refills not needed today.  Pharmacy name entered into Ret[employer]to[employer]:    [facility] - [place], [place] - [address]  [facility] - [place], [place] - [address]    Clinical concerns: No new concerns. Provider was notified.    10 minutes for nursing intake (face to face time)     Heather Alvarenga LPN            " contractions cramping decreased fm contractions cramping decreased fm, leaking fluid at 4:30 am

## 2022-03-23 NOTE — NURSING NOTE
Port accessed by RN with powerport needle, pt tolerated well. Labs collected and sent, line flushed with NS and heparin. Vitals taken and pt checked in for next appt. Sheila Garcia         5-Fu Counseling: 5-Fluorouracil Counseling:  I discussed with the patient the risks of 5-fluorouracil including but not limited to erythema, scaling, itching, weeping, crusting, and pain.

## 2022-03-28 ENCOUNTER — TELEPHONE (OUTPATIENT)
Dept: GASTROENTEROLOGY | Facility: OUTPATIENT CENTER | Age: 82
End: 2022-03-28
Payer: MEDICARE

## 2022-03-28 ENCOUNTER — TELEPHONE (OUTPATIENT)
Dept: GASTROENTEROLOGY | Facility: CLINIC | Age: 82
End: 2022-03-28
Payer: MEDICARE

## 2022-03-28 DIAGNOSIS — Z11.59 ENCOUNTER FOR SCREENING FOR OTHER VIRAL DISEASES: Primary | ICD-10-CM

## 2022-03-28 DIAGNOSIS — C21.1 MALIGNANT NEOPLASM OF ANAL CANAL (H): Primary | ICD-10-CM

## 2022-03-28 NOTE — TELEPHONE ENCOUNTER
Screening Questions  BlueKIND OF PREP RedLOCATION [review exclusion criteria] GreenSEDATION TYPE  1. Have you had a positive covid test in the last 90 days? N    2. Are you active on mychart? Y    3. What insurance is in the chart? MEDICARE AND MEDICAID     3.   Ordering/Referring Provider: Emir Rosas    4. BMI 22.3 [BMI OVER 40-EXTENDED PREP]  If greater than 40 review exclusion criteria [PAC APPT IF @ [facility]]        5.  Respiratory Screening :  [If yes to any of the following HOSPITAL setting only]     Do you use daily home oxygen? N    Do you have mod to severe Obstructive Sleep Apnea? N  [OKAY @ [facility] SH PH RI]   Do you have Pulmonary Hypertension? N     Do you have UNCONTROLLED asthma? N        6.   Have you had a heart or lung transplant? N      7.   Are you currently on dialysis? N [ If yes, G-PREP & HOSPITAL setting only]     8.   Do you have chronic kidney disease? N [ If yes, G-PREP ]    9.   Have you had a stroke or Transient ischemic attack (TIA - aka  mini stroke ) within 6 months?  N (If yes, please review exclusion criteria)    10.   In the past 6 months, have you had any heart related issues including cardiomyopathy or heart attack? N           If yes, did it require cardiac stenting or other implantable device? N      11.   Do you have any implantable devices in your body (pacemaker, defib, LVAD)? N (If yes, please review exclusion criteria)    12.   Do you take nitroglycerin? N           If yes, how often?   (if yes, HOSPITAL setting ONLY)    13.   Are you currently taking any blood thinners? N           [IF YES, INFORM PATIENT TO FOLLOW UP W/ ORDERING PROVIDER FOR BRIDGING INSTRUCTIONS]     14.   Do you have a diagnosis of diabetes? N   [ If yes, G-PREP ]    15.   [FEMALES] Are you currently pregnant?     If yes, how many weeks?     16.   Are you taking any prescription pain medications on a routine schedule?  N  [ If yes, EXTENDED PREP.] [If yes, MAC]    17.   Do you have any  chemical dependencies such as alcohol, street drugs, or methadone?  N [If yes, MAC]    18.   Do you have any history of post-traumatic stress syndrome, severe anxiety or history of psychosis?  N  [If yes, MAC]    19.   Do you have a significant intellectual disability?  N [If yes, MAC]    20.   Do you transfer independently?  Y-DOES USE A WALKER BUT DOES WALK FINE    21.  On a regular basis do you go 3-5 days between bowel movements? N   [ If yes, EXTENDED PREP.]    22.   Preferred LOCAL Pharmacy for Pre Prescription   SEE CHART NOTES PLEASE       Scheduling Details      Caller : Katherine-friend [phone]-on day of intake please call Jose THAPA at her home she is living at [phone]  (Please ask for phone number if not scheduled by patient)    Type of Procedure Scheduled: colon  Which Colonoscopy Prep was Sent?: monica PETERSON CF PATIENTS & GROEN'S PATIENTS NEEDS EXTENDED PREP  Surgeon: Gardenia  Date of Procedure: 4/20/22  Location: [facility]      Sedation Type: MAC  Conscious Sedation- Needs  for 6 hours after the procedure  MAC/General-Needs  for 24 hours after procedure    Pre-op Required at [facility] [place], [place] and OR for MAC sedation:   (advise patient they will need a pre-op prior to procedure -)      Informed patient they will need an adult  y  Cannot take any type of public or medical transportation alone    Pre-Procedure Cov[facility] or Externally: externally hopefully at home she lives at     [facility] Nurse will call to complete assessment y    Additional comments:

## 2022-03-28 NOTE — TELEPHONE ENCOUNTER
Please see [employer] message regarding patients symptoms. Discussed with . Plan for MRI and colonoscopy. Gave number for imaging/endoscopy number.

## 2022-03-28 NOTE — TELEPHONE ENCOUNTER
[facility] Call Center    Phone Message    May a detailed message be left on voicemail: yes     Reason for Call: Other: Katherine is requesting a call back from Emili please to discuss having Pt seen in clinic ASAP. Please advise. Thank you!     Action Taken: Message routed to:  [facility]al     Travel Screening: Not Applicable

## 2022-04-12 ENCOUNTER — TELEPHONE (OUTPATIENT)
Dept: GASTROENTEROLOGY | Facility: CLINIC | Age: 82
End: 2022-04-12
Payer: MEDICARE

## 2022-04-12 DIAGNOSIS — Z12.11 ENCOUNTER FOR SCREENING COLONOSCOPY: Primary | ICD-10-CM

## 2022-04-12 RX ORDER — BISACODYL 5 MG/1
TABLET, DELAYED RELEASE ORAL
Qty: 4 TABLET | Refills: 0 | Status: SHIPPED | OUTPATIENT
Start: 2022-04-12 | End: 2022-04-18

## 2022-04-12 RX ORDER — POLYETHYLENE GLYCOL 3350 17 G/17G
POWDER, FOR SOLUTION ORAL
Qty: 238 G | Refills: 0 | Status: SHIPPED | OUTPATIENT
Start: 2022-04-12 | End: 2022-04-18

## 2022-04-12 RX ORDER — SIMETHICONE 125 MG
TABLET,CHEWABLE ORAL
Qty: 4 TABLET | Refills: 0 | Status: SHIPPED | OUTPATIENT
Start: 2022-04-12 | End: 2022-04-18

## 2022-04-12 NOTE — TELEPHONE ENCOUNTER
RN reviewed date, time, location of procedure, and designated  policy with Jose at [facility].     Jose stated that a nurse is not available at the moment to review medical information/bowel prep.  Left message to have them call  [facility] back at [phone] opt 2.    Patient scheduled for colonoscopy on 4.20.2022.     Covid test?    Arrival time: 1000    Facility location: [facility]    Sedation type: MAC    Indication for procedure: hx of malignant neoplasm anal canal; screening    Referring provider: Emir Rosas MD    Bowel prep recommendation: Miralax/Magnesium citrate/Dulcolax; verify bowel habits.      June Brewer RN

## 2022-04-12 NOTE — TELEPHONE ENCOUNTER
Cyndi, nurse from [facility] returning call.    COVID test: Pt's nurse is aware that pt needs a PCR COVID test within 4 days of procedure.  Fax number for [facility] given for results; [phone]    Anticoagulation/blood thinners? no    Electronic implanted devices? no    Constipation/straining with bowel movements? No.  Pt has miralax on med list daily however per pt's nurse pt refused daily stating that she is having 1-3 bowel movements/day.    Reviewed Miralax/Magnesium citrate/Dulcolax prep instructions with patient. No fiber/iron supplements or foods that contain nuts/seeds prior to procedure.     Cyndi requests Miralax/Magnesium citrate/Dulcolax prep communication via unencrypted e-mail. Cyndi acknowledges that they have agreed to accept the risks and receive unencrypted communications for this incidence.     Cyndi verbalized understanding and had no questions or concerns at this time.    June Brewer RN

## 2022-04-17 ENCOUNTER — LAB REQUISITION (OUTPATIENT)
Dept: LAB | Facility: CLINIC | Age: 82
End: 2022-04-17
Payer: MEDICARE

## 2022-04-17 ENCOUNTER — HEALTH MAINTENANCE LETTER (OUTPATIENT)
Age: 82
End: 2022-04-17

## 2022-04-17 DIAGNOSIS — U07.1 COVID-19: ICD-10-CM

## 2022-04-17 LAB — SARS-COV-2 RNA RESP QL NAA+PROBE: NEGATIVE

## 2022-04-17 PROCEDURE — U0005 INFEC AGEN DETEC AMPLI PROBE: HCPCS | Mod: ORL | Performed by: INTERNAL MEDICINE

## 2022-04-18 ENCOUNTER — ASSISTED LIVING VISIT (OUTPATIENT)
Dept: GERIATRICS | Facility: CLINIC | Age: 82
End: 2022-04-18
Payer: MEDICARE

## 2022-04-18 VITALS
HEIGHT: 64 IN | BODY MASS INDEX: 21.48 KG/M2 | RESPIRATION RATE: 18 BRPM | WEIGHT: 125.8 LBS | OXYGEN SATURATION: 96 % | TEMPERATURE: 97 F | SYSTOLIC BLOOD PRESSURE: 122 MMHG | DIASTOLIC BLOOD PRESSURE: 74 MMHG | HEART RATE: 73 BPM

## 2022-04-18 DIAGNOSIS — R21 GROIN RASH: ICD-10-CM

## 2022-04-18 DIAGNOSIS — Z85.048 HISTORY OF MALIGNANT NEOPLASM OF ANUS: ICD-10-CM

## 2022-04-18 DIAGNOSIS — I87.8 VENOUS STASIS OF BOTH LOWER EXTREMITIES: Primary | ICD-10-CM

## 2022-04-18 DIAGNOSIS — G89.4 CHRONIC PAIN SYNDROME: ICD-10-CM

## 2022-04-18 RX ORDER — METHOCARBAMOL 500 MG/1
500 TABLET, FILM COATED ORAL 3 TIMES DAILY PRN
COMMUNITY
End: 2023-11-16

## 2022-04-18 NOTE — LETTER
4/18/2022        RE: Elizabeth Taylor  [address] [facility] [address]        [facility]  April 18, 2022    Chief Complaint   Patient presents with     Nursing Home Acute     Groin rash, LE stasis, anal cancer due for surveillance        HPI:    Elizabeth Taylor is a 81 year old  (1940), who is being seen today at [facility] where she has resided since July 2021 - initially admitted to the TCU after a right periprosthetic hip fracture which was able to be managed conservatively (no surgery). She moved into [facility] in August 2021. 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 MR pelvis in June 2021 without local recurrence. She has a colonoscopy scheduled for later this week on 4/20.     Today, Ms. Taylor is seen in her room. She was initially napping and I woke her up, which she said she was appreciative of. Tried to explain the thien of visits and that she needs to let the staff know if she wants a visit as her main concern today is that she had a rash in her left groin that she felt was not attended to. It's not clear to me if nursing was aware? She describes it as a blister type rash and showed me the area where it was - now entirely resolved. It was self limited - she reports no treatments were used (powders or creams). She has stasis changes in both legs along with swelling. She feels overall the swelling has gotten better, but the discoloration of the skin remains the same. Talked about importance of elevating her legs. She is aware of the upcoming colonoscopy and is hopeful that there will be continued remission of the cancer. No other questions or concerns today.     ALLERGIES: Darvon [propoxyphene], Ketoconazole, and Penicillins    PAST MEDICAL HISTORY:   Past Medical History:   Diagnosis Date     Anal cancer (H)      Endometriosis, site unspecified      History of malignant neoplasm of anus 7/26/2021     Periprosthetic  fracture of right hip, subsequent encounter 7/26/2021     Thyroiditis, unspecified     Thryoiditis-resolved       PAST SURGICAL HISTORY:   Past Surgical History:   Procedure Laterality Date     APPENDECTOMY      as a child     ARTHROPLASTY HIP Right 7/26/2018    Procedure: ARTHROPLASTY HIP;  Right Hip Hemiarthroplasty;  Surgeon: Zaire Phan MD;  [facility]     COLONOSCOPY N/A 4/13/2017    Procedure: COLONOSCOPY;  Surgeon: Juan Dos Santos MD;  Location: [facility] GI     INSERT PORT VASCULAR ACCESS Right 2/8/2018    Procedure: INSERT PORT VASCULAR ACCESS;  Place Single Lumen Venous Chest Port Placement;  Surgeon: Zaire Moreira PA-C;  [facility]     SURGICAL HISTORY OF -       endometriosis       FAMILY HISTORY:   Family History   Problem Relation Age of Onset     Cancer Sister      Cancer Maternal Grandmother         lymphoma     Heart Disease Father         MI     Uterine Cancer Niece        SOCIAL HISTORY:   Lives in a SNF    MEDICATIONS:  Current Outpatient Medications   Medication Sig Dispense Refill     acetaminophen (TYLENOL) 500 MG tablet Take 1,000 mg by mouth 3 times daily       Calcium-Magnesium-Zinc 333-133-5 MG TABS per tablet Take 1 tablet by mouth daily       cholecalciferol (VITAMIN D3) 5000 units (125 mcg) capsule 1 CAP BY MOUTH DAILY (DX: OSTEOPOROSIS) 90 capsule 3     docusate sodium (COLACE) 100 MG capsule Take 100 mg by mouth 2 times daily as needed for constipation       gabapentin (NEURONTIN) 300 MG capsule Take 1 capsule (300 mg) by mouth 3 times daily       methocarbamol (ROBAXIN) 500 MG tablet Take 1,000 mg by mouth 4 times daily       polyethylene glycol (MIRALAX) 17 GM/Dose powder Take 17 g by mouth daily 510 g      vitamin B complex with vitamin C (STRESS TAB) tablet Take 1 tablet by mouth daily       Vitamin E 670 MG (1000 UT) CAPS Take 1 capsule by mouth daily         Medications reviewed:  Medications reconciled to facility chart and changes were  "made to reflect current medications as identified as above med list. Below are the changes that were made:   Medications stopped since last EPIC medication reconciliation:   Medications Discontinued During This Encounter   Medication Reason     simethicone (MYLICON) 125 MG chewable tablet Medication Reconciliation Clean Up     polyethylene glycol (MIRALAX) 17 GM/Dose powder Medication Reconciliation Clean Up     oxyCODONE (ROXICODONE) 5 MG tablet Medication Reconciliation Clean Up     magnesium citrate solution Medication Reconciliation Clean Up     estradiol (ESTRACE) 0.1 MG/GM vaginal cream Medication Reconciliation Clean Up     conjugated estrogens (PREMARIN) 0.625 MG/GM vaginal cream Medication Reconciliation Clean Up     bisacodyl (DULCOLAX) 5 MG EC tablet Medication Reconciliation Clean Up     Medications started since last EPIC medication reconciliation:  Orders Placed This Encounter   Medications     methocarbamol (ROBAXIN) 500 MG tablet     Sig: Take 1,000 mg by mouth 4 times daily       ROS:  4 point ROS neg other than the symptoms noted above in the HPI.    PHYSICAL EXAM:  /74   Pulse 73   Temp 97  F (36.1  C)   Resp 18   Ht 1.626 m (5' 4\")   Wt 57.1 kg (125 lb 12.8 oz)   LMP  (LMP Unknown)   SpO2 96%   BMI 21.59 kg/m    Gen: sitting on side of the bed, alert, cooperative and in no acute distress  Card: RRR, S1, S2, no murmurs  Resp: lungs clear to auscultation bilaterally, no crackles or wheezes  Ext: bilateral dependent non pitting LE edema, L>R  Neuro: CX II-XII grossly in tact; ROM in all four extremities grossly in tact  Psych: alert and oriented to self and general situation  Skin: bilateral stasis changes to both legs; no open sores    Lab/Diagnostic data:  Reviewed as per Epic    ASSESSMENT/PLAN    Bilateral LE Venous Stasis  She reports the edema is a bit better than baseline. Legs are ladan but without any signs of skin sores.   -- discussed importance of elevating legs    History " of Anal Cancer s/p CXRT   Completed treatment in March 2018. Follows with Dr. Rosas. Review of chart with some concern for skin changes - she didn't bring this up today - she has a colonoscopy scheduled for 4/20.   -- colonoscopy and imaging per colorectal surgery  -- bowel regimen with docusate 100 mg BID PRN, Miralax 17g daily     Chronic Pain   Has been on the methocarbamol since at least 2018 from what I can gather. R periprosthetic hip fracture over the summer. Ambulates with a walker.   -- analgesia with APAP 1000 mg TID, gabapentin 300 mg TID and methocarbamol 1000 mg QID    Left Groin Rash, Resolved  Does have an area of hyperpigmented skin, but not sure if that's new wince the rash? Discussed with Elizabeth this could have been from friction from the brief, a contact dermatitis or a skin yeast infection. It does appear fully resolved  -- encouraged her to let nurses know if/when something like this happens    Electronically signed by:  Olive Brock MD            Sincerely,        Olive Brock MD

## 2022-04-18 NOTE — PROGRESS NOTES
[facility] GERIATRICS  April 18, 2022    Chief Complaint   Patient presents with     Nursing Home Acute     Groin rash, LE stasis, anal cancer due for surveillance        HPI:    Elizabeth Taylor is a 81 year old  (1940), who is being seen today at [facility] where she has resided since July 2021 - initially admitted to the TCU after a right periprosthetic hip fracture which was able to be managed conservatively (no surgery). She moved into [facility] in August 2021. 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 MR pelvis in June 2021 without local recurrence. She has a colonoscopy scheduled for later this week on 4/20.     Today, Ms. Taylor is seen in her room. She was initially napping and I woke her up, which she said she was appreciative of. Tried to explain the thien of visits and that she needs to let the staff know if she wants a visit as her main concern today is that she had a rash in her left groin that she felt was not attended to. It's not clear to me if nursing was aware? She describes it as a blister type rash and showed me the area where it was - now entirely resolved. It was self limited - she reports no treatments were used (powders or creams). She has stasis changes in both legs along with swelling. She feels overall the swelling has gotten better, but the discoloration of the skin remains the same. Talked about importance of elevating her legs. She is aware of the upcoming colonoscopy and is hopeful that there will be continued remission of the cancer. No other questions or concerns today.     ALLERGIES: Darvon [propoxyphene], Ketoconazole, and Penicillins    PAST MEDICAL HISTORY:   Past Medical History:   Diagnosis Date     Anal cancer (H)      Endometriosis, site unspecified      History of malignant neoplasm of anus 7/26/2021     Periprosthetic fracture of right hip, subsequent encounter 7/26/2021     Thyroiditis, unspecified     Thryoiditis-resolved        PAST SURGICAL HISTORY:   Past Surgical History:   Procedure Laterality Date     APPENDECTOMY      as a child     ARTHROPLASTY HIP Right 7/26/2018    Procedure: ARTHROPLASTY HIP;  Right Hip Hemiarthroplasty;  Surgeon: Zaire Phan MD;  [facility]     COLONOSCOPY N/A 4/13/2017    Procedure: COLONOSCOPY;  Surgeon: Juan Dos Santos MD;  Location: [facility] GI     INSERT PORT VASCULAR ACCESS Right 2/8/2018    Procedure: INSERT PORT VASCULAR ACCESS;  Place Single Lumen Venous Chest Port Placement;  Surgeon: Zaire Moreira PA-C;  [facility]     SURGICAL HISTORY OF -       endometriosis       FAMILY HISTORY:   Family History   Problem Relation Age of Onset     Cancer Sister      Cancer Maternal Grandmother         lymphoma     Heart Disease Father         MI     Uterine Cancer Niece        SOCIAL HISTORY:   Lives in a SNF    MEDICATIONS:  Current Outpatient Medications   Medication Sig Dispense Refill     acetaminophen (TYLENOL) 500 MG tablet Take 1,000 mg by mouth 3 times daily       Calcium-Magnesium-Zinc 333-133-5 MG TABS per tablet Take 1 tablet by mouth daily       cholecalciferol (VITAMIN D3) 5000 units (125 mcg) capsule 1 CAP BY MOUTH DAILY (DX: OSTEOPOROSIS) 90 capsule 3     docusate sodium (COLACE) 100 MG capsule Take 100 mg by mouth 2 times daily as needed for constipation       gabapentin (NEURONTIN) 300 MG capsule Take 1 capsule (300 mg) by mouth 3 times daily       methocarbamol (ROBAXIN) 500 MG tablet Take 1,000 mg by mouth 4 times daily       polyethylene glycol (MIRALAX) 17 GM/Dose powder Take 17 g by mouth daily 510 g      vitamin B complex with vitamin C (STRESS TAB) tablet Take 1 tablet by mouth daily       Vitamin E 670 MG (1000 UT) CAPS Take 1 capsule by mouth daily         Medications reviewed:  Medications reconciled to facility chart and changes were made to reflect current medications as identified as above med list. Below are the changes that were made:  "  Medications stopped since last EPIC medication reconciliation:   Medications Discontinued During This Encounter   Medication Reason     simethicone (MYLICON) 125 MG chewable tablet Medication Reconciliation Clean Up     polyethylene glycol (MIRALAX) 17 GM/Dose powder Medication Reconciliation Clean Up     oxyCODONE (ROXICODONE) 5 MG tablet Medication Reconciliation Clean Up     magnesium citrate solution Medication Reconciliation Clean Up     estradiol (ESTRACE) 0.1 MG/GM vaginal cream Medication Reconciliation Clean Up     conjugated estrogens (PREMARIN) 0.625 MG/GM vaginal cream Medication Reconciliation Clean Up     bisacodyl (DULCOLAX) 5 MG EC tablet Medication Reconciliation Clean Up     Medications started since last [facility] medication reconciliation:  Orders Placed This Encounter   Medications     methocarbamol (ROBAXIN) 500 MG tablet     Sig: Take 1,000 mg by mouth 4 times daily       ROS:  4 point ROS neg other than the symptoms noted above in the HPI.    PHYSICAL EXAM:  /74   Pulse 73   Temp 97  F (36.1  C)   Resp 18   Ht 1.626 m (5' 4\")   Wt 57.1 kg (125 lb 12.8 oz)   LMP  (LMP Unknown)   SpO2 96%   BMI 21.59 kg/m    Gen: sitting on side of the bed, alert, cooperative and in no acute distress  Card: RRR, S1, S2, no murmurs  Resp: lungs clear to auscultation bilaterally, no crackles or wheezes  Ext: bilateral dependent non pitting LE edema, L>R  Neuro: CX II-XII grossly in tact; ROM in all four extremities grossly in tact  Psych: alert and oriented to self and general situation  Skin: bilateral stasis changes to both legs; no open sores    Lab/Diagnostic data:  Reviewed as per Epic    ASSESSMENT/PLAN    Bilateral LE Venous Stasis  She reports the edema is a bit better than baseline. Legs are ladan but without any signs of skin sores.   -- discussed importance of elevating legs    History of Anal Cancer s/p CXRT   Completed treatment in March 2018. Follows with Dr. Rosas. Review of chart " with some concern for skin changes - she didn't bring this up today - she has a colonoscopy scheduled for 4/20.   -- colonoscopy and imaging per colorectal surgery  -- bowel regimen with docusate 100 mg BID PRN, Miralax 17g daily     Chronic Pain   Has been on the methocarbamol since at least 2018 from what I can gather. R periprosthetic hip fracture over the summer. Ambulates with a walker.   -- analgesia with APAP 1000 mg TID, gabapentin 300 mg TID and methocarbamol 1000 mg QID    Left Groin Rash, Resolved  Does have an area of hyperpigmented skin, but not sure if that's new wince the rash? Discussed with Elizabeth this could have been from friction from the brief, a contact dermatitis or a skin yeast infection. It does appear fully resolved  -- encouraged her to let nurses know if/when something like this happens    Electronically signed by:  Olive Brock MD

## 2022-04-19 ENCOUNTER — ANCILLARY PROCEDURE (OUTPATIENT)
Dept: MRI IMAGING | Facility: CLINIC | Age: 82
End: 2022-04-19
Attending: COLON & RECTAL SURGERY
Payer: MEDICARE

## 2022-04-19 DIAGNOSIS — Z95.828 PORT-A-CATH IN PLACE: Primary | ICD-10-CM

## 2022-04-19 DIAGNOSIS — C21.1 MALIGNANT NEOPLASM OF ANAL CANAL (H): ICD-10-CM

## 2022-04-19 PROCEDURE — 72197 MRI PELVIS W/O & W/DYE: CPT | Performed by: STUDENT IN AN ORGANIZED HEALTH CARE EDUCATION/TRAINING PROGRAM

## 2022-04-19 PROCEDURE — A9585 GADOBUTROL INJECTION: HCPCS | Performed by: STUDENT IN AN ORGANIZED HEALTH CARE EDUCATION/TRAINING PROGRAM

## 2022-04-19 RX ORDER — GADOBUTROL 604.72 MG/ML
7.5 INJECTION INTRAVENOUS ONCE
Status: COMPLETED | OUTPATIENT
Start: 2022-04-19 | End: 2022-04-19

## 2022-04-19 RX ORDER — HEPARIN SODIUM (PORCINE) LOCK FLUSH IV SOLN 100 UNIT/ML 100 UNIT/ML
500 SOLUTION INTRAVENOUS ONCE
Status: DISCONTINUED | OUTPATIENT
Start: 2022-04-19 | End: 2023-07-17

## 2022-04-19 RX ADMIN — GADOBUTROL 6.5 ML: 604.72 INJECTION INTRAVENOUS at 16:53

## 2022-04-19 NOTE — PROGRESS NOTES
Port De-Access done, line flushed with heparin before de-accessing.   Spent Time with patient: less than 15 minutes.

## 2022-04-20 ENCOUNTER — DOCUMENTATION ONLY (OUTPATIENT)
Dept: GASTROENTEROLOGY | Facility: OUTPATIENT CENTER | Age: 82
End: 2022-04-20
Payer: MEDICARE

## 2022-04-20 ENCOUNTER — TRANSFERRED RECORDS (OUTPATIENT)
Dept: HEALTH INFORMATION MANAGEMENT | Facility: CLINIC | Age: 82
End: 2022-04-20
Payer: MEDICARE

## 2022-04-20 DIAGNOSIS — C21.1 MALIGNANT NEOPLASM OF ANAL CANAL (H): ICD-10-CM

## 2022-05-20 ENCOUNTER — PATIENT OUTREACH (OUTPATIENT)
Dept: OTHER | Facility: CLINIC | Age: 82
End: 2022-05-20
Payer: MEDICARE

## 2022-07-08 ENCOUNTER — DOCUMENTATION ONLY (OUTPATIENT)
Dept: GERIATRICS | Facility: CLINIC | Age: 82
End: 2022-07-08

## 2022-08-11 ENCOUNTER — PATIENT OUTREACH (OUTPATIENT)
Dept: GERIATRIC MEDICINE | Facility: CLINIC | Age: 82
End: 2022-08-11

## 2022-08-11 NOTE — LETTER
August 11, 2022    Important [employer] Information    GERHARD SU  [address] [facility] [address]  A Partner in Your Care  Dear Gerhard,    My name is QUYNH Coronel, [facility]  and I will be working with you as your Care Coordinator. [facility] partners with [employer] to provide members with Care Coordination services.  As your Care Coordinator, I can:    Work with you to create a Care Plan to keep you healthy and safe    Help you make appointments to see health care providers     Support you and your family in making health care decisions    Find community services that may interest you    Identify health benefits you are eligible for  What happens next?  To get started, I will call you. I ll ask you a few questions about your health and schedule a time to meet. You will have a chance to ask me questions, too.  Questions?  Call me at [phone] Monday-Friday between 8am and 5pm. TTY: [phone]. I look forward to speaking with you soon.  Sincerely,      QUYNH Coronel [facility]    E-mail: [email or web address]  Phone: [phone]      [facility] [employer]    cc: member records                                                                                                                CB5 (MCOs) (5-2020)    Civil Rights Notice  Discrimination is against the law. [employer] does not discriminate on the basis of any of the following:    Race    Color    National Origin    Creed    Sabianism    Age    Public Assistance Status    Receipt of Health Care Services    Disability (including physical or mental impairment)    Sex (including sex stereotypes and gender identity)    Marital Status    Political Beliefs    Medical Condition    Genetic Information    Sexual Orientation    Claims Experience    Medical History    Health Status    Auxiliary Aids and Services:  [employer] provides auxiliary aids and services, like qualified interpreters or information in accessible formats, free of charge  and in a timely manner, to ensure an equal opportunity to participate in our health care programs. Contact [employer] at [employer]/contact medicaid or call [phone] (toll free); TTY:71 or at [employer]/contactDataLockercaid.    Language Assistance Services:  [facility] provides translated documents and spoken language interpreting, free of charge and in a timely manner, when language assistance services are necessary to ensure limited English speakers have meaningful access to our information and services. Contact [employer] at [phone] (toll free); TTY: 71 or [employer]/contactDataLockercaid.     Civil Rights Complaints  You have the right to file a discrimination complaint if you believe you were treated in a discriminatory way by [employer]. You may contact any of the following four agencies directly to file a discrimination complaint.      U.S. Department of Health and Human Services  Office for Civil Rights (OCR)  You have the right to file a complaint with the OCR, a federal agency, if you believe you have been discriminated against because of any of the following:    Race    Disability    Color    Sex    National Origin    Age    Congregation (in some cases)    Contact the OCR directly to file a complaint:         Director         U.S. Department of Health and Human Services  Office for Civil Rights         [address] [place] [address]         Customer Response Center: Toll-free: [phone]          TDD: [phone]         Email: [email or web address]    [place] Department of Human Rights (MDHR)  In [place], you have the right to file a complaint with the MDHR if you believe you have been discriminated against because of any of the following:      Race    Color    National Origin    Congregation    Creed    Sex    Sexual Orientation    Marital Status    Public Assistance Status    Disability    Contact the MD directly to file a complaint:         [place]  Department of Human Rights         [address] [address]         [phone] (voice)          [phone] (toll free)         711 or [phone] ([place] Relay)         [phone] (Fax)         [email or web address] (Email)     [place] Department of Human Services (DHS)  You have the right to file a complaint with [facility] if you believe you have been discriminated against in our health care programs because of any of the following:    Race    Color    National Origin    Creed    Samaritan    Age    Public Assistance Status    Receipt of Health Care Services    Disability (including physical or mental impairment)    Sex (including sex stereotypes and gender identity)    Marital Status    Political Beliefs    Medical Condition    Genetic Information    Sexual Orientation    Claims Experience    Medical History    Health Status    Complaints must be in writing and filed within 180 days of the date you discovered the alleged discrimination. The complaint must contain your name and address and describe the discrimination you are complaining about. After we get your complaint, we will review it and notify you in writing about whether we have authority to investigate. If we do, we will investigate the complaint.      [facility] will notify you in writing of the investigation s outcome. You have a right to appeal the outcome if you disagree with the decision. To appeal, you must send a written request to have [facility] review the investigation outcome. Be brief and state why you disagree with the decision. Include additional information you think is important.      If you file a complaint in this way, the people who work for the agency named in the complaint cannot retaliate against you. This means they cannot punish you in any way for filing a complaint. Filing a complaint in this way does not stop you from seeking out other legal or administration actions.     Contact [facility] directly to file  a discrimination complaint:        Civil Rights Coordinator        [place] Department of Human Services        Equal Opportunity and Access Division        [address] [address]        [place] [address] [phone] (voice) or use your preferred relay service     [employer] Complaint Notice   You have the right to file a complaint with [employer] if you believe you have been discriminated against because of any of the following:       Medical condition    Health status    Receipt of health care services    Claims experience    Medical history    Genetic information    Disability (including mental or physical impairment)    Marital status    Age    Sex (including sex stereotypes and gender identity)    Sexual orientation    National origin    Race    Color    Baptism    Creed    Public assistance status    Political beliefs    You can file a complaint and ask for help in filing a complaint in person or by mail, phone, fax, or email at:     [employer] Civil Rights Coordinator  [facility] [employer] [facility]  [address], [place] [address] [phone] (voice and fax) or TTY:647  Email: [email or web address]    American Indians can begin or continue to use [facility] and [facility] ([facility]) clinics. We will not require prior approval or impose any conditions for you to get services at these clinics. For elders age 65 years and older this includes Elderly Waiver (EW) services accessed through the [place]. If a doctor or other provider in a [facility] or [facility] clinic refers you to a provider in our network, we will not require you to see your primary care provider prior to the referral.

## 2022-08-11 NOTE — PROGRESS NOTES
[facility] Care Coordination Contact    Member became effective with [facility] on 8/1/2022 [facility]  Previous Health Plan: [employer]  Previous Care System: [facility] care coordinators name and number: NA  UTF received: No UTF to request  No previous visit in IS.  WL sent.     Felisha Guo  Care Management Specialist   [facility]   [phone]

## 2022-08-18 ENCOUNTER — PATIENT OUTREACH (OUTPATIENT)
Dept: GERIATRIC MEDICINE | Facility: CLINIC | Age: 82
End: 2022-08-18

## 2022-08-19 NOTE — PROGRESS NOTES
No outreach completed.  CC updated program tasks and targets for [facility] launch.    QUYNH Coronel, [facility]  [phone]

## 2022-08-26 ENCOUNTER — PATIENT OUTREACH (OUTPATIENT)
Dept: GERIATRIC MEDICINE | Facility: CLINIC | Age: 82
End: 2022-08-26

## 2022-08-28 NOTE — PROGRESS NOTES
[facility] Care Coordination Contact  CC attended care conference for member on 8/26/22 at [facility].   CC attended virtually via zoom.    Present at care conference member and her two sisters Leida and Cyndi.  Elizabeth had several questions that took the care conference time.  Most questions were around her medications and her care which staff answered and she was happy with.   Brinda will keep CC updated, and CC will meet Elizabeth in person next week at [facility].    QUYNH Coronel, [facility]  [phone]

## 2022-08-30 ENCOUNTER — PATIENT OUTREACH (OUTPATIENT)
Dept: GERIATRIC MEDICINE | Facility: CLINIC | Age: 82
End: 2022-08-30

## 2022-08-30 NOTE — PROGRESS NOTES
[facility] Care Coordination Contact:    Coordinated with THOMAS Parry and Elizabeth visit with her on this date, 8/30/22.    QUYNH Coronel, [facility]  [phone]

## 2022-08-30 NOTE — Clinical Note
Mario Cantu, I had a nice visit with Elizabeth, please let me know if I can be of any assistance, thanks! Darlyn Solorzano, QUYNH, [facility] [phone]

## 2022-09-02 ASSESSMENT — ACTIVITIES OF DAILY LIVING (ADL)
DEPENDENT_IADLS:: CLEANING;COOKING;LAUNDRY;SHOPPING;MEAL PREPARATION;MONEY MANAGEMENT;MEDICATION MANAGEMENT;TRANSPORTATION;INCONTINENCE

## 2022-09-02 NOTE — PROGRESS NOTES
[facility] Care Coordination Contact:    [facility] Institutional Assessment     Institutional Assessment for Health Risk Assessment with Elizabeth Taylor completed on August 30th, 2022 at [facility]    Type of residence:: Nursing home  Current living arrangement:: I live in a nursing home     Assessment completed with:: Patient    Mental/Behavioral Health   Depression Screening:   PHQ-2 Total Score (Adult) - Positive if 3 or more points; Administer PHQ-9 if positive: 0    Mental health DX:: No        Falls Assessment:   Fallen 2 or more times in the past year?: Yes   Any fall with injury in the past year?: Yes, hip fracture as a result of a fall.    ADL/IADL Dependencies:   Dependent ADLs:: Bathing  Dependent IADLs:: Cleaning, Cooking, Laundry, Shopping, Meal Preparation, Money Management, Medication Management, Transportation, Incontinence      Care Plan & Recommendations: CC completed visit with Elizabeth on this date at [facility].  Elizabeth was very pleasant, and I explained my role to her.  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  She shared that she has a great support system and is very happy living at [facility].   Discussed options/opportunities for transitions.    CC notes there was a large spider on Elizabeth's pillow that CC killed and informed the aides who came to bring her medication about it.    See Institutional Care Plan for detailed assessment information.    Obtained a copy of the facility care plan and MDS from facility electronic records. Requested of snf social worker to put this care coordinator on care conference attendee list.    Placed the Health Plan facility face sheet in the member's facility chart.    Follow-Up Plan: Member informed of future contact, plan to f/u with member with a 6 month assessment, attend 1 care conference annually, and will follow any hospitalizations or  transitions. Care Coordinator contact information shared with member/family and facility, and encouraged to call this care coordinator with any questions or concerns at any time.     [facility] care continuum providers: Please see Snapshot and Care Management Flowsheets for Specific details of care plan.    This CC note routed to PCP.    QUYNH Coronel, [facility]  [phone]

## 2022-10-29 ENCOUNTER — HEALTH MAINTENANCE LETTER (OUTPATIENT)
Age: 82
End: 2022-10-29

## 2023-01-16 ENCOUNTER — ASSISTED LIVING VISIT (OUTPATIENT)
Dept: GERIATRICS | Facility: CLINIC | Age: 83
End: 2023-01-16
Payer: COMMERCIAL

## 2023-01-16 VITALS
WEIGHT: 114.2 LBS | SYSTOLIC BLOOD PRESSURE: 136 MMHG | TEMPERATURE: 98.2 F | RESPIRATION RATE: 18 BRPM | HEIGHT: 64 IN | OXYGEN SATURATION: 95 % | BODY MASS INDEX: 19.5 KG/M2 | DIASTOLIC BLOOD PRESSURE: 74 MMHG | HEART RATE: 72 BPM

## 2023-01-16 DIAGNOSIS — Z85.048 HISTORY OF MALIGNANT NEOPLASM OF ANUS: ICD-10-CM

## 2023-01-16 DIAGNOSIS — G31.84 MILD COGNITIVE IMPAIRMENT: Primary | ICD-10-CM

## 2023-01-16 DIAGNOSIS — N95.2 ATROPHIC VAGINITIS: ICD-10-CM

## 2023-01-16 DIAGNOSIS — F51.04 PSYCHOPHYSIOLOGICAL INSOMNIA: ICD-10-CM

## 2023-01-16 PROCEDURE — 99349 HOME/RES VST EST MOD MDM 40: CPT | Performed by: NURSE PRACTITIONER

## 2023-01-16 NOTE — LETTER
"    2023        RE: Elizabeth Taylor  [facility]   [address]  [place] [address]        [facility]    Name:   Elizabeth Taylor  :   1940  MRN:    [id number]     Facility:   [facility] [[id number]]   Room: [place] [address]  Code Status: FULL CODE and POLST AVAILABLE -     DOS: 2023      PCP:  Jann Henderson    CHIEF COMPLAINT / REASON FOR VISIT:  Chief Complaint   Patient presents with     Clinic Care Coordination - Follow-up     Groin rash, LE stasis, anal cancer due for surveillance           [facility] from 2021 until 2021 (right periprosthetic hip fracture)  [facility] until 2021 (transfer from TCU to board and care)      HPI: Elizabeth is an 81 year old female with a past medical history significant for 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  She had tried managing right hip pain as an outpatient, but it soon became unbearable.  X-rays were performed at the direction of her PCP, and they showed \"suspicion of fracture\" of the right hip.  In the ED, a CT showed a periprosthetic fracture.  Orthopedics evaluated patient and advised that she could be weightbearing as tolerated with assistant without the need for surgical intervention.  She was admitted to the ED observation unit and was not treated with scheduled acetaminophen, gabapentin, cyclobenzaprine, Lidoderm patch, and as needed oxycodone.  Her pain was well controlled with these measures.  However, she did have some confusion, and the patient was discussed with her sister, Sahil.  Sahil confirmed that the patient has baseline memory problems.  Patient and ultimately the " "TCU were advised to only use oxycodone for severe breakthrough pain, as there is concern of worsening her confusion.  Prior to discharge, the cyclobenzaprine was discontinued, and he was discharged to the TCU on the remaining medications.  She can be weightbearing as tolerated with a walker and should follow-up with Dr. Phan in 6 weeks for repeat x-rays.      CURRENT/RECENT ISSUES    Disposition     -- \"I get such good care here,\" she says.  She continues to thrive in the board and care environment, and her enthusiasm is similar at each encounter.       Anal cancer   -- She had a visit on 08/27/2021 with [facility] (Dr. Zaire Blanca).  It was noted that she is 3.5 years out from the completion of chemoradiotherapy and remains radiologically MARGARET.  She is to follow-up with the oncology NP in 12 months and as needed thereafter.    Cognition   -- In recent cognitive testing, she scored a perfect 15/15 on the BIMS on 11/14/2022.  Difficult cognitive issues involve executive function mostly.  -- She has, in the past, admitted to some confusion; however, for the most part, she is alert and oriented.  She typically remembers things we talked about.  She is very cogent this morning but, at our initial visit in the TCU, she admitted to some confusion, stating that she did not know where she was.  Speech/thoughts tended to be rambling and disjointed.  I requested OT or SLP to perform cognitive assessments which clearly indicated that assisted living or long-term care would be appropriate and that she would necessarily require 24-hour care.    Insomnia   -- Apparently lifetime; however, as she has settled in, her sleep health has improved.    History of malignant neoplasm of the anal canal  -- She continues to be followed by [facility], her last visit occurring in April.      ROS: Other than occasional arthritic discomfort, she really offers no complaints. 10 point ROS of systems including " Constitutional, Eyes, Respiratory, Cardiovascular, Gastroenterology, Genitourinary, Integumentary, Muscularskeletal, Psychiatric were all negative except for pertinent positives noted in my HPI.      Past Medical History:   Diagnosis Date     Anal cancer (H)      Endometriosis, site unspecified      History of malignant neoplasm of anus 7/26/2021     Periprosthetic fracture of right hip, subsequent encounter 7/26/2021     Thyroiditis, unspecified     Thryoiditis-resolved              Family History   Problem Relation Age of Onset     Cancer Sister      Cancer Maternal Grandmother         lymphoma     Heart Disease Father         MI     Uterine Cancer Niece      Social History     Socioeconomic History     Marital status: Single     Spouse name: None     Number of children: None     Years of education: None     Highest education level: None   Occupational History     None   Tobacco Use     Smoking status: Never Smoker     Smokeless tobacco: Never Used   Substance and Sexual Activity     Alcohol use: No     Drug use: No     Sexual activity: Yes     Partners: Male   Other Topics Concern     Parent/sibling w/ CABG, MI or angioplasty before 65F 55M? Not Asked   Social History Narrative     None     Social Determinants of Health     Financial Resource Strain: Medium Risk     Difficulty of Paying Living Expenses: Somewhat hard   Food Insecurity: No Food Insecurity     Worried About Running Out of Food in the Last Year: Never true     Ran Out of Food in the Last Year: Never true   Transportation Needs: No Transportation Needs     Lack of Transportation (Medical): No     Lack of Transportation (Non-Medical): No   Physical Activity: Insufficiently Active     Days of Exercise per Week: 5 days     Minutes of Exercise per Session: 10 min   Stress: Stress Concern Present     Feeling of Stress : To some extent   Social Connections:      Frequency of Communication with Friends and Family:      Frequency of Social Gatherings with  "Friends and Family:      Attends Anabaptism Services:      Active Member of Clubs or Organizations:      Attends Club or Organization Meetings:      Marital Status:    Intimate Partner Violence:      Fear of Current or Ex-Partner:      Emotionally Abused:      Physically Abused:      Sexually Abused:        MEDICATIONS: Reviewed from the MAR, physician orders, and/or earlier progress notes.    Current Outpatient Medications   Medication Sig     acetaminophen (TYLENOL) 500 MG tablet Take 1,000 mg by mouth 3 times daily     Calcium-Magnesium-Zinc 333-133-5 MG TABS per tablet Take 1 tablet by mouth daily     cholecalciferol (VITAMIN D3) 5000 units (125 mcg) capsule 1 CAP BY MOUTH DAILY (DX: OSTEOPOROSIS)     docusate sodium (COLACE) 100 MG capsule Take 100 mg by mouth 2 times daily as needed for constipation     gabapentin (NEURONTIN) 300 MG capsule Take 1 capsule (300 mg) by mouth 3 times daily     methocarbamol (ROBAXIN) 500 MG tablet Take 1,000 mg by mouth 4 times daily     polyethylene glycol (MIRALAX) 17 GM/Dose powder Take 17 g by mouth daily     vitamin B complex with vitamin C (STRESS TAB) tablet Take 1 tablet by mouth daily     Vitamin E 670 MG (1000 UT) CAPS Take 1 capsule by mouth daily     Current Facility-Administered Medications   Medication     heparin 100 UNIT/ML injection 500 Units     heparin 100 UNIT/ML injection 500 Units     ALLERGIES:   Allergies   Allergen Reactions     Darvon [Propoxyphene]      Had reaction in teen years     Ketoconazole Rash     Penicillins Hives     As a child     DIET: Regular, regular texture, thin liquids.    Vitals:    01/16/23 1322   BP: 136/74   Pulse: 72   Resp: 18   Temp: 98.2  F (36.8  C)   SpO2: 95%   Weight: 51.8 kg (114 lb 3.2 oz)   Height: 1.626 m (5' 4\")     Body mass index is 19.6 kg/m .    EXAMINATION:   General: Pleasant, frail-appearing elderly female, lying in bed, in no apparent distress.  She gets up quickly on her own when she sees me so that she can show " "me some of the things in her room..  Head: Normocephalic and atraumatic.   Eyes: PERRLA, sclerae clear.  Eyes are a bit red rimmed.  ENT: Moist oral mucosa.  She has many of her own teeth.  No rhinorrhea or nasal discharge.  Hearing appears to be quite good.  Cardiovascular: Regular rate and rhythm with intermittent ectopy and no appreciable murmur.   Respiratory: Lungs CTAB.   Abdomen: Nondistended.   Musculoskeletal/Extremities: Age-related degenerative joint disease.  The right leg is \"2 inches shorter\" than the left.  Bilateral moderate to severe hallux valgus deformities, left greater than right, and hammertoes.  Currently, there is increased lower extremity edema (1-2+ bilaterally).  Port-A-Cath in the right upper chest wall.  Integument: Hypertrophic and erythematous lower extremity skin due to venous stasis, especially the left leg.  Otherwise, no rashes, clinically significant lesions, or skin breakdown.   Cognitive/Psychiatric: Clearly trying to have some cognitive deficits, though affect is quite upbeat.    DIAGNOSTICS:   Last Comprehensive Metabolic Panel:  Sodium   Date Value Ref Range Status   10/13/2021 139 136 - 145 mmol/L Final   06/14/2021 141 133 - 144 mmol/L Final     Potassium   Date Value Ref Range Status   10/13/2021 4.5 3.5 - 5.0 mmol/L Final   06/14/2021 3.7 3.4 - 5.3 mmol/L Final     Chloride   Date Value Ref Range Status   10/13/2021 102 98 - 107 mmol/L Final   06/14/2021 106 94 - 109 mmol/L Final     Carbon Dioxide   Date Value Ref Range Status   06/14/2021 31 20 - 32 mmol/L Final     Carbon Dioxide (CO2)   Date Value Ref Range Status   10/13/2021 27 22 - 31 mmol/L Final     Anion Gap   Date Value Ref Range Status   10/13/2021 10 5 - 18 mmol/L Final   06/14/2021 4 3 - 14 mmol/L Final     Glucose   Date Value Ref Range Status   10/13/2021 80 70 - 125 mg/dL Final   06/14/2021 66 (L) 70 - 99 mg/dL Final     Urea Nitrogen   Date Value Ref Range Status   10/13/2021 23 8 - 28 mg/dL Final "   06/14/2021 24 7 - 30 mg/dL Final     Creatinine   Date Value Ref Range Status   10/13/2021 0.76 0.60 - 1.10 mg/dL Final   06/14/2021 0.70 0.52 - 1.04 mg/dL Final     GFR Estimate   Date Value Ref Range Status   10/13/2021 73 >60 mL/min/1.73m2 Final     Comment:     As of July 11, 2021, eGFR is calculated by the CKD-EPI creatinine equation, without race adjustment. eGFR can be influenced by muscle mass, exercise, and diet. The reported eGFR is an estimation only and is only applicable if the renal function is stable.   06/14/2021 80 >60 mL/min/[1.73_m2] Final     Comment:     Non  GFR Calc  Starting 12/18/2018, serum creatinine based estimated GFR (eGFR) will be   calculated using the Chronic Kidney Disease Epidemiology Collaboration   (CKD-EPI) equation.       Calcium   Date Value Ref Range Status   10/13/2021 9.4 8.5 - 10.5 mg/dL Final   06/14/2021 9.5 8.5 - 10.1 mg/dL Final       Lab Results   Component Value Date    WBC 4.9 07/21/2021    WBC 5.0 06/14/2021     Lab Results   Component Value Date    RBC 3.53 07/21/2021    RBC 3.96 06/14/2021     Lab Results   Component Value Date    HGB 11.5 07/21/2021    HGB 12.8 06/14/2021     Lab Results   Component Value Date    HCT 36.5 07/21/2021    HCT 39.3 06/14/2021     Lab Results   Component Value Date     07/21/2021    MCV 99 06/14/2021     Lab Results   Component Value Date    MCH 32.6 07/21/2021    MCH 32.3 06/14/2021     Lab Results   Component Value Date    MCHC 31.5 07/21/2021    MCHC 32.6 06/14/2021     Lab Results   Component Value Date    RDW 13.7 07/21/2021    RDW 14.2 06/14/2021     Lab Results   Component Value Date     07/21/2021     06/14/2021       ASSESSMENT/Plan:      ICD-10-CM    1. Mild cognitive impairment  G31.84       2. History of malignant neoplasm of anus  Z85.048       3. Atrophic vaginitis  N95.2       4. Psychophysiological insomnia  F51.04           CHANGES:  None.    CARE PLAN:  The care plan has been  reviewed and all orders signed.  Changes to care plan, if any, as noted.  Otherwise, continue current plan of care.    The above has been created using voice recognition software. Please be aware that this may unintentionally  produce inaccuracies and/or nonsensical sentences.      Electronically signed by: GRAHAM Trejo CNP        Sincerely,        GRAHAM Trejo CNP

## 2023-01-26 NOTE — NURSING NOTE
The following medication was given:     MEDICATION:  Lidocaine with epinephrine  ROUTE: SQ  SITE: Anal  DOSE: 200mg/20ml - 2ml used.  LOT #: [id number]  : [employer]  EXPIRATION DATE: 08/19  NDC#: 05054-548-56  Was there drug waste? Yes  Amount of drug waste (mL): 18.  Reason for waste:  Single use vial      Marc Guerrero LPN  January 12, 2018   V-Y Plasty Text: The defect edges were debeveled with a #15 scalpel blade.  Given the location of the defect, shape of the defect and the proximity to free margins an V-Y advancement flap was deemed most appropriate.  Using a sterile surgical marker, an appropriate advancement flap was drawn incorporating the defect and placing the expected incisions within the relaxed skin tension lines where possible.    The area thus outlined was incised deep to adipose tissue with a #15 scalpel blade.  The skin margins were undermined to an appropriate distance in all directions utilizing iris scissors.
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
## 2023-02-15 NOTE — PROGRESS NOTES
"[facility]    Name:   Elizabeth Taylor  :   1940  MRN:    [id number]     Facility:   [facility] [[id number]]   Room: [facility] 115  Code Status: FULL CODE and POLST AVAILABLE -     DOS: 2023      PCP:  Jann Henderson    CHIEF COMPLAINT / REASON FOR VISIT:  Chief Complaint   Patient presents with     Clinic Care Coordination - Follow-up     Groin rash, LE stasis, anal cancer due for surveillance           [facility] from 2021 until 2021 (right periprosthetic hip fracture)  [facility] TCU from 2021 until 2021 (transfer from TCU to board and care)      HPI: Elizabeth is an 81 year old female with a past medical history significant for 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  She had tried managing right hip pain as an outpatient, but it soon became unbearable.  X-rays were performed at the direction of her PCP, and they showed \"suspicion of fracture\" of the right hip.  In the ED, a CT showed a periprosthetic fracture.  Orthopedics evaluated patient and advised that she could be weightbearing as tolerated with assistant without the need for surgical intervention.  She was admitted to the ED observation unit and was not treated with scheduled acetaminophen, gabapentin, cyclobenzaprine, Lidoderm patch, and as needed oxycodone.  Her pain was well controlled with these measures.  However, she did have some confusion, and the patient was discussed with her sister, Sahil.  Sahil confirmed that the patient has baseline memory problems.  Patient and ultimately the TCU were advised to only use oxycodone for severe breakthrough pain, as there is concern of worsening her " "confusion.  Prior to discharge, the cyclobenzaprine was discontinued, and he was discharged to the TCU on the remaining medications.  She can be weightbearing as tolerated with a walker and should follow-up with Dr. Phan in 6 weeks for repeat x-rays.      CURRENT/RECENT ISSUES    Disposition     -- \"I get such good care here,\" she says.  She continues to thrive in the board and care environment, and her enthusiasm is similar at each encounter.       Anal cancer   -- She had a visit on 08/27/2021 with [facility] (Dr. Zaire Blanca).  It was noted that she is 3.5 years out from the completion of chemoradiotherapy and remains radiologically MARGARET.  She is to follow-up with the oncology NP in 12 months and as needed thereafter.    Cognition   -- In recent cognitive testing, she scored a perfect 15/15 on the BIMS on 11/14/2022.  Difficult cognitive issues involve executive function mostly.  -- She has, in the past, admitted to some confusion; however, for the most part, she is alert and oriented.  She typically remembers things we talked about.  She is very cogent this morning but, at our initial visit in the TCU, she admitted to some confusion, stating that she did not know where she was.  Speech/thoughts tended to be rambling and disjointed.  I requested OT or SLP to perform cognitive assessments which clearly indicated that assisted living or long-term care would be appropriate and that she would necessarily require 24-hour care.    Insomnia   -- Apparently lifetime; however, as she has settled in, her sleep health has improved.    History of malignant neoplasm of the anal canal  -- She continues to be followed by [facility], her last visit occurring in April.      ROS: Other than occasional arthritic discomfort, she really offers no complaints. 10 point ROS of systems including Constitutional, Eyes, Respiratory, Cardiovascular, Gastroenterology, Genitourinary, Integumentary, " Muscularskeletal, Psychiatric were all negative except for pertinent positives noted in my HPI.      Past Medical History:   Diagnosis Date     Anal cancer (H)      Endometriosis, site unspecified      History of malignant neoplasm of anus 7/26/2021     Periprosthetic fracture of right hip, subsequent encounter 7/26/2021     Thyroiditis, unspecified     Thryoiditis-resolved              Family History   Problem Relation Age of Onset     Cancer Sister      Cancer Maternal Grandmother         lymphoma     Heart Disease Father         MI     Uterine Cancer Niece      Social History     Socioeconomic History     Marital status: Single     Spouse name: None     Number of children: None     Years of education: None     Highest education level: None   Occupational History     None   Tobacco Use     Smoking status: Never Smoker     Smokeless tobacco: Never Used   Substance and Sexual Activity     Alcohol use: No     Drug use: No     Sexual activity: Yes     Partners: Male   Other Topics Concern     Parent/sibling w/ CABG, MI or angioplasty before 65F 55M? Not Asked   Social History Narrative     None     Social Determinants of Health     Financial Resource Strain: Medium Risk     Difficulty of Paying Living Expenses: Somewhat hard   Food Insecurity: No Food Insecurity     Worried About Running Out of Food in the Last Year: Never true     Ran Out of Food in the Last Year: Never true   Transportation Needs: No Transportation Needs     Lack of Transportation (Medical): No     Lack of Transportation (Non-Medical): No   Physical Activity: Insufficiently Active     Days of Exercise per Week: 5 days     Minutes of Exercise per Session: 10 min   Stress: Stress Concern Present     Feeling of Stress : To some extent   Social Connections:      Frequency of Communication with Friends and Family:      Frequency of Social Gatherings with Friends and Family:      Attends [employer] Services:      Active Member of Clubs or Organizations:   "    Attends Club or Organization Meetings:      Marital Status:    Intimate Partner Violence:      Fear of Current or Ex-Partner:      Emotionally Abused:      Physically Abused:      Sexually Abused:        MEDICATIONS: Reviewed from the MAR, physician orders, and/or earlier progress notes.    Current Outpatient Medications   Medication Sig     acetaminophen (TYLENOL) 500 MG tablet Take 1,000 mg by mouth 3 times daily     Calcium-Magnesium-Zinc 333-133-5 MG TABS per tablet Take 1 tablet by mouth daily     cholecalciferol (VITAMIN D3) 5000 units (125 mcg) capsule 1 CAP BY MOUTH DAILY (DX: OSTEOPOROSIS)     docusate sodium (COLACE) 100 MG capsule Take 100 mg by mouth 2 times daily as needed for constipation     gabapentin (NEURONTIN) 300 MG capsule Take 1 capsule (300 mg) by mouth 3 times daily     methocarbamol (ROBAXIN) 500 MG tablet Take 1,000 mg by mouth 4 times daily     polyethylene glycol (MIRALAX) 17 GM/Dose powder Take 17 g by mouth daily     vitamin B complex with vitamin C (STRESS TAB) tablet Take 1 tablet by mouth daily     Vitamin E 670 MG (1000 UT) CAPS Take 1 capsule by mouth daily     Current Facility-Administered Medications   Medication     heparin 100 UNIT/ML injection 500 Units     heparin 100 UNIT/ML injection 500 Units     ALLERGIES:   Allergies   Allergen Reactions     Darvon [Propoxyphene]      Had reaction in teen years     Ketoconazole Rash     Penicillins Hives     As a child     DIET: Regular, regular texture, thin liquids.    Vitals:    01/16/23 1322   BP: 136/74   Pulse: 72   Resp: 18   Temp: 98.2  F (36.8  C)   SpO2: 95%   Weight: 51.8 kg (114 lb 3.2 oz)   Height: 1.626 m (5' 4\")     Body mass index is 19.6 kg/m .    EXAMINATION:   General: Pleasant, frail-appearing elderly female, lying in bed, in no apparent distress.  She gets up quickly on her own when she sees me so that she can show me some of the things in her room..  Head: Normocephalic and atraumatic.   Eyes: PERRLA, sclerae " "clear.  Eyes are a bit red rimmed.  ENT: Moist oral mucosa.  She has many of her own teeth.  No rhinorrhea or nasal discharge.  Hearing appears to be quite good.  Cardiovascular: Regular rate and rhythm with intermittent ectopy and no appreciable murmur.   Respiratory: Lungs CTAB.   Abdomen: Nondistended.   Musculoskeletal/Extremities: Age-related degenerative joint disease.  The right leg is \"2 inches shorter\" than the left.  Bilateral moderate to severe hallux valgus deformities, left greater than right, and hammertoes.  Currently, there is increased lower extremity edema (1-2+ bilaterally).  Port-A-Cath in the right upper chest wall.  Integument: Hypertrophic and erythematous lower extremity skin due to venous stasis, especially the left leg.  Otherwise, no rashes, clinically significant lesions, or skin breakdown.   Cognitive/Psychiatric: Clearly trying to have some cognitive deficits, though affect is quite upbeat.    DIAGNOSTICS:   Last Comprehensive Metabolic Panel:  Sodium   Date Value Ref Range Status   10/13/2021 139 136 - 145 mmol/L Final   06/14/2021 141 133 - 144 mmol/L Final     Potassium   Date Value Ref Range Status   10/13/2021 4.5 3.5 - 5.0 mmol/L Final   06/14/2021 3.7 3.4 - 5.3 mmol/L Final     Chloride   Date Value Ref Range Status   10/13/2021 102 98 - 107 mmol/L Final   06/14/2021 106 94 - 109 mmol/L Final     Carbon Dioxide   Date Value Ref Range Status   06/14/2021 31 20 - 32 mmol/L Final     Carbon Dioxide (CO2)   Date Value Ref Range Status   10/13/2021 27 22 - 31 mmol/L Final     Anion Gap   Date Value Ref Range Status   10/13/2021 10 5 - 18 mmol/L Final   06/14/2021 4 3 - 14 mmol/L Final     Glucose   Date Value Ref Range Status   10/13/2021 80 70 - 125 mg/dL Final   06/14/2021 66 (L) 70 - 99 mg/dL Final     Urea Nitrogen   Date Value Ref Range Status   10/13/2021 23 8 - 28 mg/dL Final   06/14/2021 24 7 - 30 mg/dL Final     Creatinine   Date Value Ref Range Status   10/13/2021 0.76 0.60 " - 1.10 mg/dL Final   06/14/2021 0.70 0.52 - 1.04 mg/dL Final     GFR Estimate   Date Value Ref Range Status   10/13/2021 73 >60 mL/min/1.73m2 Final     Comment:     As of July 11, 2021, eGFR is calculated by the CKD-EPI creatinine equation, without race adjustment. eGFR can be influenced by muscle mass, exercise, and diet. The reported eGFR is an estimation only and is only applicable if the renal function is stable.   06/14/2021 80 >60 mL/min/[1.73_m2] Final     Comment:     Non  GFR Calc  Starting 12/18/2018, serum creatinine based estimated GFR (eGFR) will be   calculated using the Chronic Kidney Disease Epidemiology Collaboration   (CKD-EPI) equation.       Calcium   Date Value Ref Range Status   10/13/2021 9.4 8.5 - 10.5 mg/dL Final   06/14/2021 9.5 8.5 - 10.1 mg/dL Final       Lab Results   Component Value Date    WBC 4.9 07/21/2021    WBC 5.0 06/14/2021     Lab Results   Component Value Date    RBC 3.53 07/21/2021    RBC 3.96 06/14/2021     Lab Results   Component Value Date    HGB 11.5 07/21/2021    HGB 12.8 06/14/2021     Lab Results   Component Value Date    HCT 36.5 07/21/2021    HCT 39.3 06/14/2021     Lab Results   Component Value Date     07/21/2021    MCV 99 06/14/2021     Lab Results   Component Value Date    MCH 32.6 07/21/2021    MCH 32.3 06/14/2021     Lab Results   Component Value Date    MCHC 31.5 07/21/2021    MCHC 32.6 06/14/2021     Lab Results   Component Value Date    RDW 13.7 07/21/2021    RDW 14.2 06/14/2021     Lab Results   Component Value Date     07/21/2021     06/14/2021       ASSESSMENT/Plan:      ICD-10-CM    1. Mild cognitive impairment  G31.84       2. History of malignant neoplasm of anus  Z85.048       3. Atrophic vaginitis  N95.2       4. Psychophysiological insomnia  F51.04           CHANGES:  None.    CARE PLAN:  The care plan has been reviewed and all orders signed.  Changes to care plan, if any, as noted.  Otherwise, continue current  plan of care.    The above has been created using voice recognition software. Please be aware that this may unintentionally  produce inaccuracies and/or nonsensical sentences.      Electronically signed by: GRAHAM Trejo CNP

## 2023-02-21 ENCOUNTER — PATIENT OUTREACH (OUTPATIENT)
Dept: GERIATRIC MEDICINE | Facility: CLINIC | Age: 83
End: 2023-02-21
Payer: COMMERCIAL

## 2023-02-22 NOTE — PROGRESS NOTES
[facility] Six-Month Assessment    6 month assessment completed on 2/16/23 with [facility]  Brinda.    ER visits: No  Hospitalizations: No  TCU stays: No  Significant health status changes: None  Falls/Injuries: No  ADL/IADL changes: No    Reviewed Institutional Assessment and updated as needed.     Will see member in 6 months for an annual health risk assessment.   Encouraged member to call [facility] with any questions or concerns in the meantime.     QUYNH Coronel, [facility]  [phone]

## 2023-03-06 ENCOUNTER — TELEPHONE (OUTPATIENT)
Dept: GERIATRICS | Facility: CLINIC | Age: 83
End: 2023-03-06
Payer: COMMERCIAL

## 2023-03-06 NOTE — TELEPHONE ENCOUNTER
[facility] NON-EMERGENT VOICEMAIL ENCOUNTER    Elizabeth Taylor is a 82 year old  (1940), Voicemail Message received today regarding:   Unwitnessed fall    Disposition    Vm left on the after hours line stating that patient had a fall on Sunday around 4:30 am.  Patient went to the bathroom by self and slipped on some urine on the floor.  Patient got up by self and walked out into the hallway to alert staff.  Staff assessed patient and VS WNL.  No injuries noted.      Requests    FYI      Electronically signed by:   Kandy Floyd RN

## 2023-04-07 ENCOUNTER — ASSISTED LIVING VISIT (OUTPATIENT)
Dept: GERIATRICS | Facility: CLINIC | Age: 83
End: 2023-04-07
Payer: COMMERCIAL

## 2023-04-07 VITALS
BODY MASS INDEX: 19.02 KG/M2 | HEART RATE: 61 BPM | WEIGHT: 111.4 LBS | OXYGEN SATURATION: 93 % | SYSTOLIC BLOOD PRESSURE: 141 MMHG | RESPIRATION RATE: 18 BRPM | TEMPERATURE: 98.9 F | DIASTOLIC BLOOD PRESSURE: 76 MMHG | HEIGHT: 64 IN

## 2023-04-07 DIAGNOSIS — F02.B4 MODERATE LATE ONSET ALZHEIMER'S DEMENTIA WITH ANXIETY (H): Primary | ICD-10-CM

## 2023-04-07 DIAGNOSIS — Z85.048 HISTORY OF MALIGNANT NEOPLASM OF ANUS: ICD-10-CM

## 2023-04-07 DIAGNOSIS — R60.0 LOWER EXTREMITY EDEMA: ICD-10-CM

## 2023-04-07 DIAGNOSIS — G30.1 MODERATE LATE ONSET ALZHEIMER'S DEMENTIA WITH ANXIETY (H): Primary | ICD-10-CM

## 2023-04-07 DIAGNOSIS — F51.04 PSYCHOPHYSIOLOGICAL INSOMNIA: ICD-10-CM

## 2023-04-07 DIAGNOSIS — M41.34 THORACOGENIC SCOLIOSIS OF THORACIC REGION: ICD-10-CM

## 2023-04-07 PROCEDURE — 99349 HOME/RES VST EST MOD MDM 40: CPT | Performed by: NURSE PRACTITIONER

## 2023-04-07 NOTE — LETTER
"    2023        RE: Elizabeth Taylor  [facility]   Chun Lopes  [facility] [id number]        [facility]    Name:   Elizabeth Taylor  :   1940  MRN:    [id number]     Facility:   [facility] [[id number]]   Room: [place] [address]  Code Status: FULL CODE and POLST AVAILABLE -     DOS: 2023      PCP:  GRAHAM Trejo CNP    CHIEF COMPLAINT / REASON FOR VISIT:  Chief Complaint   Patient presents with     Clinic Care Coordination - Follow-up     Cognitive decline and its sequelae         [facility] from 2021 until 2021 (right periprosthetic hip fracture)  [facility]CU from 2021 until 2021 (transfer from TCU to Board and Care)      HPI: Elizabeth is an 81 year old female with a past medical history significant for 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  She had tried managing right hip pain as an outpatient, but it soon became unbearable.  X-rays were performed at the direction of her PCP, and they showed \"suspicion of fracture\" of the right hip.  In the ED, a CT showed a periprosthetic fracture.  Orthopedics evaluated patient and advised that she could be weightbearing as tolerated with assistant without the need for surgical intervention.  She was admitted to the ED observation unit and was not treated with scheduled acetaminophen, gabapentin, cyclobenzaprine, Lidoderm patch, and as needed oxycodone.  Her pain was well controlled with these measures.  However, she did have some confusion, and the patient was discussed with her sister, Sahil.  Sahil confirmed that the patient has baseline memory problems.  Patient and ultimately the TCU " were advised to only use oxycodone for severe breakthrough pain, as there is concern of worsening her confusion.  Prior to discharge, the cyclobenzaprine was discontinued, and he was discharged to the TCU on the remaining medications.  She can be weightbearing as tolerated with a walker and should follow-up with Dr. Phan in 6 weeks for repeat x-rays.      CURRENT/RECENT ISSUES    Disposition     -- She continues to thrive in the board and care environment, and her enthusiasm is similar at each encounter.  However, a significant decline in cognition is noted.  She used to remember my name and could give me the correct year and month.  In fact, she scored a perfect 15/15 on the BIMS on 11/14/2022, only 5 months ago.  Now, she cannot do any of those things, and her living quarters is piled high on every conceivable flat surface: Wheelchair, guest chairs, 4 wheeled walker, and bed.  She needs to clear a space for me to sit and for her to sit.  -- She has had at least one fall, on 03/06.  No apparent injury.    Anal cancer   -- She had a visit on 08/27/2021 with [facility] (Dr. Zaire Blanca).  It was noted that she is 3.5 years out from the completion of chemoradiotherapy and remains radiologically MARGARET.  She was to follow-up with the oncology NP in 12 months and as needed thereafter.    Alzheimer's dementia  -- As mentioned above, she scored a perfect 15/15 on the BIMS on 11/14/2022.  She did admit to some confusion, though she was, for the most part, alert and oriented.  She would typically remember things we talked about at the prior visit.  When last seen on 01/16/2023, cognitive issues were becoming more clear, and they involved executive function mostly.  Since then, cognitive decline has been more dramatic.  -- Some of the things she talks about no longer make any sense.  -- She tells me she just woke up, though I found her sitting on the toilet.  -- After guessing 1980 and  "July, she tells me she looks at the television for the date.  She does have a small frame with the day, date, and temperature, though she doesn't realize she has it.    Insomnia   -- Apparently lifetime.  She laughs, \"I haven't slept in decades.\"  It doesn't seem to bother her.    Lower extremity edema  -- She doesn't think compression stockings are good.  I tried to convince her otherwise, but she would not be convinced.    History of malignant neoplasm of the anal canal  -- She continues to be followed by [facility], her last visit occurring in April.    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    Family contacts  -- Sister, Sahil, is her POA.      ROS: Other than occasional arthritic discomfort, she really offers no complaints. 10 point ROS of systems including Constitutional, Eyes, Respiratory, Cardiovascular, Gastroenterology, Genitourinary, Integumentary, Muscularskeletal, Psychiatric were all negative except for pertinent positives noted in my HPI.      Past Medical History:   Diagnosis Date     Anal cancer (H)      Endometriosis, site unspecified      History of malignant neoplasm of anus 7/26/2021     Periprosthetic fracture of right hip, subsequent encounter 7/26/2021     Thyroiditis, unspecified     Thryoiditis-resolved              Family History   Problem Relation Age of Onset     Cancer Sister      Cancer Maternal Grandmother         lymphoma     Heart Disease Father         MI     Uterine Cancer Niece      Social History     Socioeconomic History     Marital status: Single     Spouse name: None     Number of children: None     Years of education: None     Highest education level: None   Occupational History     None   Tobacco Use     Smoking status: Never Smoker     Smokeless tobacco: Never Used   Substance and Sexual Activity     Alcohol use: No     Drug use: No     Sexual activity: Yes     Partners: Male   Other Topics Concern     Parent/sibling w/ CABG, MI or angioplasty before " 65F 55M? Not Asked   Social History Narrative     None     Social Determinants of Health     Financial Resource Strain: Medium Risk     Difficulty of Paying Living Expenses: Somewhat hard   Food Insecurity: No Food Insecurity     Worried About Running Out of Food in the Last Year: Never true     Ran Out of Food in the Last Year: Never true   Transportation Needs: No Transportation Needs     Lack of Transportation (Medical): No     Lack of Transportation (Non-Medical): No   Physical Activity: Insufficiently Active     Days of Exercise per Week: 5 days     Minutes of Exercise per Session: 10 min   Stress: Stress Concern Present     Feeling of Stress : To some extent   Social Connections:      Frequency of Communication with Friends and Family:      Frequency of Social Gatherings with Friends and Family:      Attends Religion Services:      Active Member of Clubs or Organizations:      Attends Club or Organization Meetings:      Marital Status:    Intimate Partner Violence:      Fear of Current or Ex-Partner:      Emotionally Abused:      Physically Abused:      Sexually Abused:        MEDICATIONS: Reviewed from the MAR, physician orders, and/or earlier progress notes.    Current Outpatient Medications   Medication Sig     acetaminophen (TYLENOL) 500 MG tablet Take 1,000 mg by mouth 3 times daily     Calcium-Magnesium-Zinc 333-133-5 MG TABS per tablet Take 1 tablet by mouth daily     cholecalciferol (VITAMIN D3) 5000 units (125 mcg) capsule 1 CAP BY MOUTH DAILY (DX: OSTEOPOROSIS)     docusate sodium (COLACE) 100 MG capsule Take 100 mg by mouth 2 times daily as needed for constipation     gabapentin (NEURONTIN) 300 MG capsule Take 1 capsule (300 mg) by mouth 3 times daily     methocarbamol (ROBAXIN) 500 MG tablet Take 1,000 mg by mouth 4 times daily     polyethylene glycol (MIRALAX) 17 GM/Dose powder Take 17 g by mouth daily     vitamin B complex with vitamin C (STRESS TAB) tablet Take 1 tablet by mouth daily      "Vitamin E 670 MG (1000 UT) CAPS Take 1 capsule by mouth daily     Current Facility-Administered Medications   Medication     heparin 100 UNIT/ML injection 500 Units     heparin 100 UNIT/ML injection 500 Units     ALLERGIES:   Allergies   Allergen Reactions     Darvon [Propoxyphene]      Had reaction in teen years     Ketoconazole Rash     Penicillins Hives     As a child     DIET: Regular, regular texture, thin liquids.    Vitals:    04/07/23 1529   BP: (!) 141/76   Pulse: 61   Resp: 18   Temp: 98.9  F (37.2  C)   SpO2: 93%   Weight: 50.5 kg (111 lb 6.4 oz)   Height: 1.626 m (5' 4\")     Body mass index is 19.12 kg/m .    EXAMINATION:   General: Pleasant, frail-appearing, very loquacious elderly female, up in a wheelchair after getting off the toilet independently, in no apparent distress.  She had to remove a pile of her things on the wheelchair so she could sit.  She is wearing 4 layers of clothing.  Head: Normocephalic and atraumatic.   Eyes: PERRLA, sclerae clear.  ENT: Moist oral mucosa.  Poor dentition with decayed teeth..  No rhinorrhea or nasal discharge.  Hearing appears to be quite good.  Cardiovascular: Regular rate and rhythm with intermittent ectopy and no appreciable murmur.   Respiratory: Lungs CTAB.   Abdomen: Nondistended.   Musculoskeletal/Extremities: Age-related osteoarthritic changes.  She reminds me that the right leg is \"2 inches shorter\" than the left.  Bilateral moderate hallux valgus deformities, left slightly worse than right, and hammertoes.  Bilateral 2+ ankle edema.  Port-A-Cath in the right upper chest wall.  Integument: Hypertrophic and erythematous lower extremity skin due to venous stasis, especially the left leg.  Otherwise, no rashes, clinically significant lesions, or skin breakdown.   Cognitive/Psychiatric: Worsening cognition.  Affect euthymic.    DIAGNOSTICS:   Last Comprehensive Metabolic Panel:  Sodium   Date Value Ref Range Status   10/13/2021 139 136 - 145 mmol/L Final "   06/14/2021 141 133 - 144 mmol/L Final     Potassium   Date Value Ref Range Status   10/13/2021 4.5 3.5 - 5.0 mmol/L Final   06/14/2021 3.7 3.4 - 5.3 mmol/L Final     Chloride   Date Value Ref Range Status   10/13/2021 102 98 - 107 mmol/L Final   06/14/2021 106 94 - 109 mmol/L Final     Carbon Dioxide   Date Value Ref Range Status   06/14/2021 31 20 - 32 mmol/L Final     Carbon Dioxide (CO2)   Date Value Ref Range Status   10/13/2021 27 22 - 31 mmol/L Final     Anion Gap   Date Value Ref Range Status   10/13/2021 10 5 - 18 mmol/L Final   06/14/2021 4 3 - 14 mmol/L Final     Glucose   Date Value Ref Range Status   10/13/2021 80 70 - 125 mg/dL Final   06/14/2021 66 (L) 70 - 99 mg/dL Final     Urea Nitrogen   Date Value Ref Range Status   10/13/2021 23 8 - 28 mg/dL Final   06/14/2021 24 7 - 30 mg/dL Final     Creatinine   Date Value Ref Range Status   10/13/2021 0.76 0.60 - 1.10 mg/dL Final   06/14/2021 0.70 0.52 - 1.04 mg/dL Final     GFR Estimate   Date Value Ref Range Status   10/13/2021 73 >60 mL/min/1.73m2 Final     Comment:     As of July 11, 2021, eGFR is calculated by the CKD-EPI creatinine equation, without race adjustment. eGFR can be influenced by muscle mass, exercise, and diet. The reported eGFR is an estimation only and is only applicable if the renal function is stable.   06/14/2021 80 >60 mL/min/[1.73_m2] Final     Comment:     Non  GFR Calc  Starting 12/18/2018, serum creatinine based estimated GFR (eGFR) will be   calculated using the Chronic Kidney Disease Epidemiology Collaboration   (CKD-EPI) equation.       Calcium   Date Value Ref Range Status   10/13/2021 9.4 8.5 - 10.5 mg/dL Final   06/14/2021 9.5 8.5 - 10.1 mg/dL Final       Lab Results   Component Value Date    WBC 4.9 07/21/2021    WBC 5.0 06/14/2021     Lab Results   Component Value Date    RBC 3.53 07/21/2021    RBC 3.96 06/14/2021     Lab Results   Component Value Date    HGB 11.5 07/21/2021    HGB 12.8 06/14/2021     Lab  Results   Component Value Date    HCT 36.5 07/21/2021    HCT 39.3 06/14/2021     Lab Results   Component Value Date     07/21/2021    MCV 99 06/14/2021     Lab Results   Component Value Date    MCH 32.6 07/21/2021    MCH 32.3 06/14/2021     Lab Results   Component Value Date    MCHC 31.5 07/21/2021    MCHC 32.6 06/14/2021     Lab Results   Component Value Date    RDW 13.7 07/21/2021    RDW 14.2 06/14/2021     Lab Results   Component Value Date     07/21/2021     06/14/2021       ASSESSMENT/Plan:      ICD-10-CM    1. Moderate late onset Alzheimer's dementia with anxiety (H)  G30.1     F02.B4       2. History of malignant neoplasm of anus  Z85.048       3. Thoracogenic dextrokyphoscoliosis of thoracic region  M41.34       4. Psychophysiological insomnia  F51.04       5. Lower extremity edema  R60.0           CHANGES:  None.    CARE PLAN:  The care plan has been reviewed and all orders signed.  Changes to care plan, if any, as noted.  Otherwise, continue current plan of care.    The above has been created using voice recognition software. Please be aware that this may unintentionally  produce inaccuracies and/or nonsensical sentences.      Electronically signed by: GRAHAM Trejo CNP        Sincerely,        GRAHAM Trejo CNP

## 2023-04-09 PROBLEM — M41.34 THORACOGENIC SCOLIOSIS OF THORACIC REGION: Status: ACTIVE | Noted: 2023-04-09

## 2023-04-10 NOTE — PROGRESS NOTES
"[facility]    Name:   Elizabeth Taylor  :   1940  MRN:    [id number]     Facility:   [facility] [[id number]]   Room: [facility] 115  Code Status: FULL CODE and POLST AVAILABLE -     DOS: 2023      PCP:  GRAHAM Trejo CNP    CHIEF COMPLAINT / REASON FOR VISIT:  Chief Complaint   Patient presents with     Clinic Care Coordination - Follow-up     Cognitive decline and its sequelae         [facility] from 2021 until 2021 (right periprosthetic hip fracture)  [facility] TCU from 2021 until 2021 (transfer from TCU to Board and Care)      HPI: Elizabeth is an 81 year old female with a past medical history significant for 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  She had tried managing right hip pain as an outpatient, but it soon became unbearable.  X-rays were performed at the direction of her PCP, and they showed \"suspicion of fracture\" of the right hip.  In the ED, a CT showed a periprosthetic fracture.  Orthopedics evaluated patient and advised that she could be weightbearing as tolerated with assistant without the need for surgical intervention.  She was admitted to the ED observation unit and was not treated with scheduled acetaminophen, gabapentin, cyclobenzaprine, Lidoderm patch, and as needed oxycodone.  Her pain was well controlled with these measures.  However, she did have some confusion, and the patient was discussed with her sister, Sahil.  Sahil confirmed that the patient has baseline memory problems.  Patient and ultimately the TCU were advised to only use oxycodone for severe breakthrough pain, as there is concern of worsening her confusion. "  Prior to discharge, the cyclobenzaprine was discontinued, and he was discharged to the TCU on the remaining medications.  She can be weightbearing as tolerated with a walker and should follow-up with Dr. Phan in 6 weeks for repeat x-rays.      CURRENT/RECENT ISSUES    Disposition     -- She continues to thrive in the board and care environment, and her enthusiasm is similar at each encounter.  However, a significant decline in cognition is noted.  She used to remember my name and could give me the correct year and month.  In fact, she scored a perfect 15/15 on the BIMS on 11/14/2022, only 5 months ago.  Now, she cannot do any of those things, and her living quarters is piled high on every conceivable flat surface: Wheelchair, guest chairs, 4 wheeled walker, and bed.  She needs to clear a space for me to sit and for her to sit.  -- She has had at least one fall, on 03/06.  No apparent injury.    Anal cancer   -- She had a visit on 08/27/2021 with [facility] (Dr. Zaire Blanca).  It was noted that she is 3.5 years out from the completion of chemoradiotherapy and remains radiologically MARGARET.  She was to follow-up with the oncology NP in 12 months and as needed thereafter.    Alzheimer's dementia  -- As mentioned above, she scored a perfect 15/15 on the BIMS on 11/14/2022.  She did admit to some confusion, though she was, for the most part, alert and oriented.  She would typically remember things we talked about at the prior visit.  When last seen on 01/16/2023, cognitive issues were becoming more clear, and they involved executive function mostly.  Since then, cognitive decline has been more dramatic.  -- Some of the things she talks about no longer make any sense.  -- She tells me she just woke up, though I found her sitting on the toilet.  -- After guessing 1980 and July, she tells me she looks at the television for the date.  She does have a small frame with the day, date, and  "temperature, though she doesn't realize she has it.    Insomnia   -- Apparently lifetime.  She laughs, \"I haven't slept in decades.\"  It doesn't seem to bother her.    Lower extremity edema  -- She doesn't think compression stockings are good.  I tried to convince her otherwise, but she would not be convinced.    History of malignant neoplasm of the anal canal  -- She continues to be followed by [facility], her last visit occurring in April.    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    Family contacts  -- Sister, Sahil, is her POA.      ROS: Other than occasional arthritic discomfort, she really offers no complaints. 10 point ROS of systems including Constitutional, Eyes, Respiratory, Cardiovascular, Gastroenterology, Genitourinary, Integumentary, Muscularskeletal, Psychiatric were all negative except for pertinent positives noted in my HPI.      Past Medical History:   Diagnosis Date     Anal cancer (H)      Endometriosis, site unspecified      History of malignant neoplasm of anus 7/26/2021     Periprosthetic fracture of right hip, subsequent encounter 7/26/2021     Thyroiditis, unspecified     Thryoiditis-resolved              Family History   Problem Relation Age of Onset     Cancer Sister      Cancer Maternal Grandmother         lymphoma     Heart Disease Father         MI     Uterine Cancer Niece      Social History     Socioeconomic History     Marital status: Single     Spouse name: None     Number of children: None     Years of education: None     Highest education level: None   Occupational History     None   Tobacco Use     Smoking status: Never Smoker     Smokeless tobacco: Never Used   Substance and Sexual Activity     Alcohol use: No     Drug use: No     Sexual activity: Yes     Partners: Male   Other Topics Concern     Parent/sibling w/ CABG, MI or angioplasty before 65F 55M? Not Asked   Social History Narrative     None     Social Determinants of Health     Financial Resource " Strain: Medium Risk     Difficulty of Paying Living Expenses: Somewhat hard   Food Insecurity: No Food Insecurity     Worried About Running Out of Food in the Last Year: Never true     Ran Out of Food in the Last Year: Never true   Transportation Needs: No Transportation Needs     Lack of Transportation (Medical): No     Lack of Transportation (Non-Medical): No   Physical Activity: Insufficiently Active     Days of Exercise per Week: 5 days     Minutes of Exercise per Session: 10 min   Stress: Stress Concern Present     Feeling of Stress : To some extent   Social Connections:      Frequency of Communication with Friends and Family:      Frequency of Social Gatherings with Friends and Family:      Attends Confucianism Services:      Active Member of Clubs or Organizations:      Attends Club or Organization Meetings:      Marital Status:    Intimate Partner Violence:      Fear of Current or Ex-Partner:      Emotionally Abused:      Physically Abused:      Sexually Abused:        MEDICATIONS: Reviewed from the MAR, physician orders, and/or earlier progress notes.    Current Outpatient Medications   Medication Sig     acetaminophen (TYLENOL) 500 MG tablet Take 1,000 mg by mouth 3 times daily     Calcium-Magnesium-Zinc 333-133-5 MG TABS per tablet Take 1 tablet by mouth daily     cholecalciferol (VITAMIN D3) 5000 units (125 mcg) capsule 1 CAP BY MOUTH DAILY (DX: OSTEOPOROSIS)     docusate sodium (COLACE) 100 MG capsule Take 100 mg by mouth 2 times daily as needed for constipation     gabapentin (NEURONTIN) 300 MG capsule Take 1 capsule (300 mg) by mouth 3 times daily     methocarbamol (ROBAXIN) 500 MG tablet Take 1,000 mg by mouth 4 times daily     polyethylene glycol (MIRALAX) 17 GM/Dose powder Take 17 g by mouth daily     vitamin B complex with vitamin C (STRESS TAB) tablet Take 1 tablet by mouth daily     Vitamin E 670 MG (1000 UT) CAPS Take 1 capsule by mouth daily     Current Facility-Administered Medications  "  Medication     heparin 100 UNIT/ML injection 500 Units     heparin 100 UNIT/ML injection 500 Units     ALLERGIES:   Allergies   Allergen Reactions     Darvon [Propoxyphene]      Had reaction in teen years     Ketoconazole Rash     Penicillins Hives     As a child     DIET: Regular, regular texture, thin liquids.    Vitals:    04/07/23 1529   BP: (!) 141/76   Pulse: 61   Resp: 18   Temp: 98.9  F (37.2  C)   SpO2: 93%   Weight: 50.5 kg (111 lb 6.4 oz)   Height: 1.626 m (5' 4\")     Body mass index is 19.12 kg/m .    EXAMINATION:   General: Pleasant, frail-appearing, very loquacious elderly female, up in a wheelchair after getting off the toilet independently, in no apparent distress.  She had to remove a pile of her things on the wheelchair so she could sit.  She is wearing 4 layers of clothing.  Head: Normocephalic and atraumatic.   Eyes: PERRLA, sclerae clear.  ENT: Moist oral mucosa.  Poor dentition with decayed teeth..  No rhinorrhea or nasal discharge.  Hearing appears to be quite good.  Cardiovascular: Regular rate and rhythm with intermittent ectopy and no appreciable murmur.   Respiratory: Lungs CTAB.   Abdomen: Nondistended.   Musculoskeletal/Extremities: Age-related osteoarthritic changes.  She reminds me that the right leg is \"2 inches shorter\" than the left.  Bilateral moderate hallux valgus deformities, left slightly worse than right, and hammertoes.  Bilateral 2+ ankle edema.  Port-A-Cath in the right upper chest wall.  Integument: Hypertrophic and erythematous lower extremity skin due to venous stasis, especially the left leg.  Otherwise, no rashes, clinically significant lesions, or skin breakdown.   Cognitive/Psychiatric: Worsening cognition.  Affect euthymic.    DIAGNOSTICS:   Last Comprehensive Metabolic Panel:  Sodium   Date Value Ref Range Status   10/13/2021 139 136 - 145 mmol/L Final   06/14/2021 141 133 - 144 mmol/L Final     Potassium   Date Value Ref Range Status   10/13/2021 4.5 3.5 - 5.0 " mmol/L Final   06/14/2021 3.7 3.4 - 5.3 mmol/L Final     Chloride   Date Value Ref Range Status   10/13/2021 102 98 - 107 mmol/L Final   06/14/2021 106 94 - 109 mmol/L Final     Carbon Dioxide   Date Value Ref Range Status   06/14/2021 31 20 - 32 mmol/L Final     Carbon Dioxide (CO2)   Date Value Ref Range Status   10/13/2021 27 22 - 31 mmol/L Final     Anion Gap   Date Value Ref Range Status   10/13/2021 10 5 - 18 mmol/L Final   06/14/2021 4 3 - 14 mmol/L Final     Glucose   Date Value Ref Range Status   10/13/2021 80 70 - 125 mg/dL Final   06/14/2021 66 (L) 70 - 99 mg/dL Final     Urea Nitrogen   Date Value Ref Range Status   10/13/2021 23 8 - 28 mg/dL Final   06/14/2021 24 7 - 30 mg/dL Final     Creatinine   Date Value Ref Range Status   10/13/2021 0.76 0.60 - 1.10 mg/dL Final   06/14/2021 0.70 0.52 - 1.04 mg/dL Final     GFR Estimate   Date Value Ref Range Status   10/13/2021 73 >60 mL/min/1.73m2 Final     Comment:     As of July 11, 2021, eGFR is calculated by the CKD-EPI creatinine equation, without race adjustment. eGFR can be influenced by muscle mass, exercise, and diet. The reported eGFR is an estimation only and is only applicable if the renal function is stable.   06/14/2021 80 >60 mL/min/[1.73_m2] Final     Comment:     Non  GFR Calc  Starting 12/18/2018, serum creatinine based estimated GFR (eGFR) will be   calculated using the Chronic Kidney Disease Epidemiology Collaboration   (CKD-EPI) equation.       Calcium   Date Value Ref Range Status   10/13/2021 9.4 8.5 - 10.5 mg/dL Final   06/14/2021 9.5 8.5 - 10.1 mg/dL Final       Lab Results   Component Value Date    WBC 4.9 07/21/2021    WBC 5.0 06/14/2021     Lab Results   Component Value Date    RBC 3.53 07/21/2021    RBC 3.96 06/14/2021     Lab Results   Component Value Date    HGB 11.5 07/21/2021    HGB 12.8 06/14/2021     Lab Results   Component Value Date    HCT 36.5 07/21/2021    HCT 39.3 06/14/2021     Lab Results   Component Value  Date     07/21/2021    MCV 99 06/14/2021     Lab Results   Component Value Date    MCH 32.6 07/21/2021    MCH 32.3 06/14/2021     Lab Results   Component Value Date    MCHC 31.5 07/21/2021    MCHC 32.6 06/14/2021     Lab Results   Component Value Date    RDW 13.7 07/21/2021    RDW 14.2 06/14/2021     Lab Results   Component Value Date     07/21/2021     06/14/2021       ASSESSMENT/Plan:      ICD-10-CM    1. Moderate late onset Alzheimer's dementia with anxiety (H)  G30.1     F02.B4       2. History of malignant neoplasm of anus  Z85.048       3. Thoracogenic dextrokyphoscoliosis of thoracic region  M41.34       4. Psychophysiological insomnia  F51.04       5. Lower extremity edema  R60.0           CHANGES:  None.    CARE PLAN:  The care plan has been reviewed and all orders signed.  Changes to care plan, if any, as noted.  Otherwise, continue current plan of care.    The above has been created using voice recognition software. Please be aware that this may unintentionally  produce inaccuracies and/or nonsensical sentences.      Electronically signed by: GRAHAM Trejo CNP

## 2023-05-08 ENCOUNTER — CLINICAL UPDATE (OUTPATIENT)
Dept: PHARMACY | Facility: CLINIC | Age: 83
End: 2023-05-08
Payer: COMMERCIAL

## 2023-05-08 DIAGNOSIS — Z78.9 TAKES DIETARY SUPPLEMENTS: ICD-10-CM

## 2023-05-08 DIAGNOSIS — M62.838 MUSCLE SPASM: ICD-10-CM

## 2023-05-08 DIAGNOSIS — K59.01 SLOW TRANSIT CONSTIPATION: ICD-10-CM

## 2023-05-08 DIAGNOSIS — C21.1 MALIGNANT NEOPLASM OF ANAL CANAL (H): Primary | ICD-10-CM

## 2023-05-08 DIAGNOSIS — R41.89 COGNITIVE IMPAIRMENT: ICD-10-CM

## 2023-05-08 DIAGNOSIS — R52 PAIN: ICD-10-CM

## 2023-05-08 PROCEDURE — 99207 PR NO CHARGE LOS: CPT | Performed by: PHARMACIST

## 2023-05-08 NOTE — PROGRESS NOTES
This patient's medication list and chart were reviewed as part of the service provided by [facility] and [facility].    Assessment/Recommendations:    Appears she is due for annual labs/CMP/CBC - I do not see that labs have been checked since 10/2021.  Gabapentin does require renal dosing.  Please follow-up labs.  Consider stopping vitamin E, vitamin B complex.  Likely not adding benefit and contributing to pill load.    Noted that her current calcium supplement also contains zinc and magnesium.  Mg may contribute to gi adverse effects/diarrhea.  Consider checking Mg level with annual labs.  Could change to plain calcium supplement if necessary.    Per chart review has been on methocarbamol long-term, and may certainly benefit from trial reduction in dose and monitor.  If tolerates dose reduction, please consider further gradual dose reduction with discontinuation.  On [place] List due to older adults generally can't tolerate the medication well at doses required for benefit.  May contribute to falls, confusion, sedation, etc.  Noted her cognition has declined and she is at risk of falling.  Would therefore likely benefit from taper and discontinuation of methocarbamol.  Also of note, combo of methocarbamol with gabapentin increases even further risk of CNS depression and respiratory depression as well.  Please also consider d'c prn docusate - minimally effective med.  If medication necessary for constipation, consider senna prn.  Docusate is only a softener and will not stimulate a BM.    Marleni Jewell, Pharm.D.,[facility]  Board Certified Geriatric Pharmacist  Medication Therapy Management Pharmacist  [phone]

## 2023-05-24 NOTE — PROGRESS NOTES
Brief Social Work Note    SW given updated by RNCC that pt wants to return to [facility].  It is unclear if pt has Medicare days to cover her stay as she does not have a secondary or supplemental insurance plan.  Will ask weekend team to update pt on copays through Medicare after day 21 as applicable.  Referral sent to TCU to assist with discharge.    GOMEZ Becerra, APSW  [address] Unit   Phone: [phone]  Pager: [phone]  Unit: [phone]       likely age related./moderate

## 2023-06-01 ENCOUNTER — HEALTH MAINTENANCE LETTER (OUTPATIENT)
Age: 83
End: 2023-06-01

## 2023-06-20 ENCOUNTER — TELEPHONE (OUTPATIENT)
Dept: GERIATRICS | Facility: CLINIC | Age: 83
End: 2023-06-20
Payer: COMMERCIAL

## 2023-06-20 NOTE — TELEPHONE ENCOUNTER
[facility] NON-EMERGENT VOICEMAIL ENCOUNTER    Elizabeth Taylor is a 82 year old  (1940), Voicemail Message received today regarding:   Unwitnessed fall    Disposition    Vm received that patient had a fall around 2:30 am.  Patient rolled out of bed.  No injuries sustained.  VS: 117/85, 73, 18, and O2 95% on RA.     Requests    FYI      Electronically signed by:   Kandy Floyd RN

## 2023-06-21 ENCOUNTER — PATIENT OUTREACH (OUTPATIENT)
Dept: GERIATRIC MEDICINE | Facility: CLINIC | Age: 83
End: 2023-06-21
Payer: COMMERCIAL

## 2023-06-21 NOTE — LETTER
June 21, 2023    Important [employer] Information    [facility]  [address]  [place] [address]    Your New Care Coordinator  Dear Gehrard,  My name is Natividad Thornton MA, QUYNH  and I am your new Care Coordinator. You may reach me by calling [phone]. I will be in touch with you shortly to address any questions you may have.   I have also been in contact with QUYNH Coronel, [facility], your previous care coordinator, to ensure a smooth transition.  Questions?  Call me at [phone] Monday-Friday between 8am and 5pm. TTY: [phone]. I look forward to working with you as a [employer]  member.  Sincerely,    Natividad Thornton MA, QUYNH    E-mail: [email or web address]  Phone: [phone]      [facility]    cc: member records

## 2023-06-21 NOTE — PROGRESS NOTES
[facility] Care Coordination Contact    Internal CC change effective 07/01/2023.  Mailed member CC Change letter.  Additional tasks to be completed by CMS include: update database & EPIC, enter CC Change in MMIS, and move member files on [address] drive.

## 2023-06-23 NOTE — LETTER
Impression: AMD Dry, OU - Many drusen, AREDS2, diet, [place], URGE NON-smoking. Stable Plan: AREDS and a diet heavier in green leafy vegetables discussed. Signs and symptoms of WET macular degeneration were discussed (wavy vision, blurred vision). Patient instructed to call or return to clinic if condition gets worse.  advised to stop smoking again [facility] CARE COORDINATION    February 12, 2021    Elizabeth Taylor  [address]   [place] [address]      Dear Elizabeth,    I am a clinic care coordinator who works with Jann Henderson PA-C at [facility]. I wanted to thank you for spending the time to talk with me.  Below is a description of clinic care coordination and how I can further assist you.      The clinic care coordination team is made up of a registered nurse,  and community health worker who understand the health care system. The goal of clinic care coordination is to help you manage your health and improve access to the health care system in the most efficient manner. The team can assist you in meeting your health care goals by providing education, coordinating services, strengthening the communication among your providers and supporting you with any resource needs.    Please feel free to contact me at [phone] with any questions or concerns. We are focused on providing you with the highest-quality healthcare experience possible and that all starts with you.     Sincerely,     QUYNH Maldonado    Enclosed: I have enclosed a copy of a 24 Hour Access Plan. This has helpful phone numbers for you to call when needed. Please keep this in an easy to access place to use as needed.

## 2023-07-05 ENCOUNTER — TELEPHONE (OUTPATIENT)
Dept: SURGERY | Facility: CLINIC | Age: 83
End: 2023-07-05
Payer: COMMERCIAL

## 2023-07-05 NOTE — TELEPHONE ENCOUNTER
[facility] Call Center    Phone Message    May a detailed message be left on voicemail: yes     Reason for Call: Other:     Pt is scheduled for a flex csig on 07/10 with Dr. Rosas. Jose, the health unit coordinator at the pt's nursing home, is requesting a call back to discuss the prep the pt will need to do prior to the procedure.    [phone]- nurses line    Action Taken: Message routed to:  [facility] IRINEO DEE    Travel Screening: Not Applicable

## 2023-07-07 ENCOUNTER — DOCUMENTATION ONLY (OUTPATIENT)
Dept: GERIATRICS | Facility: CLINIC | Age: 83
End: 2023-07-07
Payer: COMMERCIAL

## 2023-07-10 ENCOUNTER — TELEPHONE (OUTPATIENT)
Dept: SURGERY | Facility: CLINIC | Age: 83
End: 2023-07-10

## 2023-07-10 NOTE — TELEPHONE ENCOUNTER
Unable to reach pt for rescheduling. Appt today 7/10 at 3:00pm needs to be rescheduled, Dr. Rosas was called to OR and is no longer available. R/S to next available. Can be [facility] or [facility] ([facility] has sooner openings it looks like). Sent mychart.

## 2023-07-12 ENCOUNTER — TELEPHONE (OUTPATIENT)
Dept: GERIATRICS | Facility: CLINIC | Age: 83
End: 2023-07-12
Payer: COMMERCIAL

## 2023-07-12 NOTE — TELEPHONE ENCOUNTER
[facility] Triage Nurse Telephone Encounter    Provider: GRAHAM Gorman CNP  Facility: [facility]  Facility Type:  LTC    Caller: Eunice  Call Back Number: [phone]    Allergies:    Allergies   Allergen Reactions     Darvon [Propoxyphene]      Had reaction in teen years     Ketoconazole Rash     Penicillins Hives     As a child        Reason for call: Nurse is reporting that patient had a fall this afternoon in the bathroom.  She said she got dizzy and fell.  She stated she bumped the left side of her head, but it wasn't reported what she hit it on.  VS are stable and neuros are per baseline.      Verbal Order/Direction given by Provider: Continue to monitor per protocol.  NP to see patient tomorrow.      Provider giving Order:  GRAHAM Gorman CNP    Verbal Order given to: Eunice Zamora RN

## 2023-07-13 ENCOUNTER — LAB REQUISITION (OUTPATIENT)
Dept: LAB | Facility: CLINIC | Age: 83
End: 2023-07-13
Payer: COMMERCIAL

## 2023-07-13 ENCOUNTER — NURSING HOME VISIT (OUTPATIENT)
Dept: GERIATRICS | Facility: CLINIC | Age: 83
End: 2023-07-13
Payer: COMMERCIAL

## 2023-07-13 VITALS
HEIGHT: 64 IN | HEART RATE: 74 BPM | DIASTOLIC BLOOD PRESSURE: 78 MMHG | WEIGHT: 114 LBS | RESPIRATION RATE: 18 BRPM | BODY MASS INDEX: 19.46 KG/M2 | TEMPERATURE: 97.2 F | OXYGEN SATURATION: 94 % | SYSTOLIC BLOOD PRESSURE: 121 MMHG
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
## 2023-07-13 DIAGNOSIS — Z87.81 STATUS POST FRACTURE OF RIGHT HIP: ICD-10-CM

## 2023-07-13 DIAGNOSIS — G31.84 MILD COGNITIVE IMPAIRMENT: ICD-10-CM

## 2023-07-13 DIAGNOSIS — G47.00 INSOMNIA, UNSPECIFIED TYPE: ICD-10-CM

## 2023-07-13 DIAGNOSIS — G30.1 MODERATE LATE ONSET ALZHEIMER'S DEMENTIA WITH ANXIETY (H): Primary | ICD-10-CM

## 2023-07-13 DIAGNOSIS — R94.01 ABNORMAL ELECTROENCEPHALOGRAM (EEG): ICD-10-CM

## 2023-07-13 DIAGNOSIS — R60.0 LOWER EXTREMITY EDEMA: ICD-10-CM

## 2023-07-13 DIAGNOSIS — F02.B4 MODERATE LATE ONSET ALZHEIMER'S DEMENTIA WITH ANXIETY (H): Primary | ICD-10-CM

## 2023-07-13 DIAGNOSIS — G89.4 CHRONIC PAIN SYNDROME: ICD-10-CM

## 2023-07-13 DIAGNOSIS — C21.1 MALIGNANT NEOPLASM OF ANAL CANAL (H): ICD-10-CM

## 2023-07-13 PROCEDURE — 99349 HOME/RES VST EST MOD MDM 40: CPT

## 2023-07-13 NOTE — LETTER
7/13/2023        RE: Elizabeth Taylor  [facility]  [address] [address]        [facility] GERIATRICS  Chief Complaint   Patient presents with     RECHECK     [facility] Medical Record Number:  [id number]  Place of Service where encounter took place:  [facility] ([facility] [[id number]]    HPI:    Elizabeth Taylor  is 83 year old (1940), who is being seen today for a federally mandated E/M visit. She has a PMH of anal canal squamous cell carcinoma (radiation therapy in 3/2018), total right hip hemiarthroplasty (2018) and right periprosthetic hip fracture (2021), and mild cognitive impairment.     Today's concerns are:The primary encounter diagnosis was Moderate late onset Alzheimer's dementia with anxiety (H). Diagnoses of Malignant neoplasm of anal canal (H), Mild cognitive impairment, Chronic pain syndrome, Status post fracture of right hip, Fall, subsequent encounter, Insomnia, unspecified type, and Lower extremity edema were also pertinent to this visit.    HPI information obtained from: facility chart records, facility staff, patient report and [facility] chart review.    Today, nursing manager brings forth concern of repeated falling. She fell on 7/12, 6/20 x2, and on 3/6. When addressed with the patient, she is not concerned over her falling and states she was taught how to fall. 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 She thinks her falls were from increased summertime activities resulting in fatigue which contributes to her likeliness of falling. She reports she lost her balance, does not recall dizziness or lightheadedness. Today, she denies pain from fall on 7/12. Denies any headaches, nausea or vomiting following fall. No neurological changes documented by nursing staff.     This week, she was also scheduled for a flex sig with GI. This appointment was canceled. She does have a history of anal canal squamous  "cell carcinoma. We discussed this exam and how she felt about missing it. She reports she was looking forward to the prospect of being to declare she was cancer free. When asked about her goals of care and whether she would want to pursue treatment should something be found, she reports \"I want twenty more years\".     When talking about general annual screenings and preventive care. She did not want any vaccines including Shingrix. She stated, \"I'm 82 years old and I haven't had shingles or any of those things, I don't want the vaccines now\". She did want Dexascan to screen for postmenopausal osteoporosis as well as lab work.     Patient denies fever, chills, headache, lightheadedness, dizziness, rhinorrhea, cough, congestion, shortness of breath, chest pain, palpitations, abdominal pain, n/v, diarrhea, constipation, change in appetite,dysuria, frequency, burning or pain with urination. She is feeling well today, and she does endorse insomnia. Staff and she both report her difficulty sleeping has been many years. She did not want melatonin or to discuss sleep further.     ALLERGIES:Darvon [propoxyphene], Ketoconazole, and Penicillins  PAST MEDICAL HISTORY:   Past Medical History:   Diagnosis Date     Anal cancer (H)      Endometriosis, site unspecified      History of malignant neoplasm of anus 7/26/2021     Periprosthetic fracture of right hip, subsequent encounter 7/26/2021     Thyroiditis, unspecified     Thryoiditis-resolved     PAST SURGICAL HISTORY:   has a past surgical history that includes surgical history of - ; Colonoscopy (N/A, 4/13/2017); appendectomy; Insert port vascular access (Right, 2/8/2018); and Arthroplasty hip (Right, 7/26/2018).  FAMILY HISTORY: family history includes Cancer in her maternal grandmother and sister; Heart Disease in her father; Uterine Cancer in her niece.  SOCIAL HISTORY:  reports that she has never smoked. She has never used smokeless tobacco. She reports that she does not " "drink alcohol and does not use drugs.    MEDICATIONS:  MED REC REQUIRED  Post Medication Reconciliation Status:          Review of your medicines          Accurate as of July 13, 2023 11:59 PM. If you have any questions, ask your nurse or doctor.            CONTINUE these medicines which have NOT CHANGED      Dose / Directions   acetaminophen 500 MG tablet  Commonly known as: TYLENOL      Dose: 1,000 mg  Take 1,000 mg by mouth 3 times daily  Refills: 0     methocarbamol 500 MG tablet  Commonly known as: ROBAXIN      Dose: 500 mg  Take 500 mg by mouth 4 times daily  Refills: 0     polyethylene glycol 17 GM/Dose powder  Commonly known as: MIRALAX  Used for: Hip fracture, right, closed, initial encounter (H)      Dose: 17 g  Take 17 g by mouth daily  Quantity: 510 g  Refills: 0     Vitamin E 670 MG (1000 UT) Caps      Dose: 1 capsule  Take 1 capsule by mouth daily  Refills: 0            Case Management:  I have reviewed the care plan and MDS and do agree with the plan. Patient's desire to return to the community is not present. Information reviewed:  Medications, vital signs, orders, and nursing notes.    ROS:  10 point ROS of systems including Constitutional, Eyes, Respiratory, Cardiovascular, Gastroenterology, Genitourinary, Integumentary, Musculoskeletal, Psychiatric were all negative except for pertinent positives noted in my HPI.    Vitals:  /78   Pulse 74   Temp 97.2  F (36.2  C)   Resp 18   Ht 1.626 m (5' 4\")   Wt 51.7 kg (114 lb)   LMP  (LMP Unknown)   SpO2 94%   BMI 19.57 kg/m    Body mass index is 19.57 kg/m .  Exam:  GENERAL APPEARANCE:  Alert, in no distress  ENT:  Mouth and posterior oropharynx normal, moist mucous membranes  EYES:  EOM, conjunctivae, lids, pupils and irises normal, PERRL  RESP:  respiratory effort and palpation of chest normal, lungs clear to auscultation   CV:  Palpation and auscultation of heart done , regular rate and rhythm, no murmur, rub, or gallop, peripheral edema " +1+ in BLE, no pitting  ABDOMEN:  normal bowel sounds, soft, nontender, no hepatosplenomegaly or other masses  M/S:   Kyphosis present, ROM present all extremities, W/C - transfers self from wheelchair  SKIN:  hemosiderin staining BLE  NEURO:   Examination of sensation by touch normal  PSYCH:  oriented to person, place, situation - thought year 1940, then [birth year]39, insight and judgement impaired, memory impaired     Lab/Diagnostic data:   Results for orders placed or performed in visit on 07/14/23   Magnesium     Status: Normal   Result Value Ref Range    Magnesium 2.0 1.7 - 2.3 mg/dL   Lipid panel reflex to direct LDL     Status: Normal   Result Value Ref Range    Cholesterol 148 <200 mg/dL    Triglycerides 75 <150 mg/dL    Direct Measure HDL 73 >=50 mg/dL    LDL Cholesterol Calculated 60 <=100 mg/dL    Non HDL Cholesterol 75 <130 mg/dL    Narrative    Cholesterol  Desirable:  <200 mg/dL    Triglycerides  Normal:  Less than 150 mg/dL  Borderline High:  150-199 mg/dL  High:  200-499 mg/dL  Very High:  Greater than or equal to 500 mg/dL    Direct Measure HDL  Female:  Greater than or equal to 50 mg/dL   Male:  Greater than or equal to 40 mg/dL    LDL Cholesterol  Desirable:  <100mg/dL  Above Desirable:  100-129 mg/dL   Borderline High:  130-159 mg/dL   High:  160-189 mg/dL   Very High:  >= 190 mg/dL    Non HDL Cholesterol  Desirable:  130 mg/dL  Above Desirable:  130-159 mg/dL  Borderline High:  160-189 mg/dL  High:  190-219 mg/dL  Very High:  Greater than or equal to 220 mg/dL   TSH with free T4 reflex     Status: Abnormal   Result Value Ref Range    TSH 26.40 (H) 0.30 - 4.20 uIU/mL   T4 free     Status: Abnormal   Result Value Ref Range    Free T4 0.73 (L) 0.90 - 1.70 ng/dL       ASSESSMENT/PLAN  (G30.1,  F02.B4) 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 She was  able to report it was almost her birthday and she was planning celebration for the weekend with friends.  Plan:   -Check TSH with reflex T4 (last checked 2018)  -Reduce robaxin dosing from 1000 mg PO QID to 500 mg PO QUID    (C21.1) Malignant neoplasm of anal canal (H)  Comment: Completed radiation in 2018, missed repeat flex sig  Plan:   -Facility to assist with prep set up and rescheduling     (G89.4) Chronic pain syndrome  (Z87.81) Status post fracture of right hip  (W19.XXXD) Fall, subsequent encounter  Comment: Pt is on high doses of Robaxin, it appears Robaxin has been ordered since hip replacement in 2018 and increased in 2022 to 1000 mg QID from 500 mg QID. Discussed the short term intent of this medication and pt agreeable to wean dose. Pt would like icy hot cream ordered for topical pain management   Plan:   -Change Robaxin from 1000 mg PO QID to 500 mg PO QID, consider discontinuing altogether should pt tolerate  -Continue Tylenol 1000 mg TID  -Start IcyHot cream q 6 hours PRN to painful areas     (G47.00) Insomnia, unspecified type  Comment: Pt reports she is at her baseline and does not want to address. Does not think improved sleep would help with falling.   Plan:   -Consider melatonin should pt start to be bothered by insomnia     (R60.0) Lower extremity edema  Comment: Non pitting, appears r/t chronic venous insufficiency as she does have hemosiderin staining and thickened skin, no open areas, neg homans, swelling equal bilaterally. Pt declines stockings, report she does do ankle pumps  Plan:   -Pt would benefit from compression stockings, declined  -Continue movement/ankle pumps     Orders:  BMP, CBC (ordered by pharm), TSH with reflex T4, Dexascan for postmenopausal screening >65, LFTs, lipid panel, magnesium, decrease Robaxin from 1000 mg QID to 500 mg QID, icy hot    Addendum: Labs revealed elevated TSH 26.40, low T4 0.73, will start Levothyroxine 25 mg PO Q day and repeat TSH in 4 weeks.      Total time spent with patient visit at the skilled nursing facility was 45 min including patient visit and review of past records. Greater than 50% of total time spent with counseling and coordinating care due to history of falling.    Electronically signed by:  GRAHAM Yoo CNP              Sincerely,        GRAHAM Yoo CNP

## 2023-07-14 LAB
CHOLEST SERPL-MCNC: 148 MG/DL
HDLC SERPL-MCNC: 73 MG/DL
LDLC SERPL CALC-MCNC: 60 MG/DL
MAGNESIUM SERPL-MCNC: 2 MG/DL (ref 1.7–2.3)
NONHDLC SERPL-MCNC: 75 MG/DL
T4 FREE SERPL-MCNC: 0.73 NG/DL (ref 0.9–1.7)
TRIGL SERPL-MCNC: 75 MG/DL
TSH SERPL DL<=0.005 MIU/L-ACNC: 26.4 UIU/ML (ref 0.3–4.2)

## 2023-07-14 PROCEDURE — 36415 COLL VENOUS BLD VENIPUNCTURE: CPT | Mod: ORL

## 2023-07-14 PROCEDURE — 83735 ASSAY OF MAGNESIUM: CPT | Mod: ORL

## 2023-07-14 PROCEDURE — 84443 ASSAY THYROID STIM HORMONE: CPT | Mod: ORL

## 2023-07-14 PROCEDURE — 80061 LIPID PANEL: CPT | Mod: ORL

## 2023-07-14 PROCEDURE — P9604 ONE-WAY ALLOW PRORATED TRIP: HCPCS | Mod: ORL

## 2023-07-14 PROCEDURE — 84439 ASSAY OF FREE THYROXINE: CPT | Mod: ORL

## 2023-07-17 ENCOUNTER — TELEPHONE (OUTPATIENT)
Dept: GERIATRICS | Facility: CLINIC | Age: 83
End: 2023-07-17
Payer: COMMERCIAL

## 2023-07-17 RX ORDER — MENTHOL AND METHYL SALICYLATE 7.6; 29 G/100G; G/100G
5 OINTMENT TOPICAL EVERY 6 HOURS PRN
COMMUNITY
End: 2023-09-11

## 2023-07-17 RX ORDER — LEVOTHYROXINE SODIUM 25 UG/1
25 TABLET ORAL DAILY
COMMUNITY
Start: 2023-07-17 | End: 2023-08-17 | Stop reason: DRUGHIGH

## 2023-07-17 NOTE — TELEPHONE ENCOUNTER
[facility] Geriatrics Lab Note     Provider: GRAHAM Gorman CNP  Facility: [facility]  Facility Type:  LTC    Allergies   Allergen Reactions     Darvon [Propoxyphene]      Had reaction in teen years     Ketoconazole Rash     Penicillins Hives     As a child       Labs Reviewed by provider: TSH, free T4, lipids, Mg---from 7/14/23.       Verbal Order/Direction given by Provider: Levothyroxine 25mcg daily.  Check TSH on 8/16/23.      Provider giving Order:  GRAHAM Gorman CNP    Verbal Order given to: Eunice([phone])    Dread Zamora RN

## 2023-07-17 NOTE — PROGRESS NOTES
[facility] GERIATRICS  Chief Complaint   Patient presents with     JASENMICHELE     [facility] Medical Record Number:  [id number]  Place of Service where encounter took place:  [facility] ([facility]) [[id number]]    HPI:    Elizabeth Taylor  is 83 year old (1940), who is being seen today for a federally mandated E/M visit. She has a PMH of anal canal squamous cell carcinoma (radiation therapy in 3/2018), total right hip hemiarthroplasty (2018) and right periprosthetic hip fracture (2021), and mild cognitive impairment.     Today's concerns are:The primary encounter diagnosis was Moderate late onset Alzheimer's dementia with anxiety (H). Diagnoses of Malignant neoplasm of anal canal (H), Mild cognitive impairment, Chronic pain syndrome, Status post fracture of right hip, Fall, subsequent encounter, Insomnia, unspecified type, and Lower extremity edema were also pertinent to this visit.    HPI information obtained from: facility chart records, facility staff, patient report and [facility] chart review.    Today, nursing manager brings forth concern of repeated falling. She fell on 7/12, 6/20 x2, and on 3/6. When addressed with the patient, she is not concerned over her falling and states she was taught how to fall. 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 She thinks her falls were from increased summertime activities resulting in fatigue which contributes to her likeliness of falling. She reports she lost her balance, does not recall dizziness or lightheadedness. Today, she denies pain from fall on 7/12. Denies any headaches, nausea or vomiting following fall. No neurological changes documented by nursing staff.     This week, she was also scheduled for a flex sig with GI. This appointment was canceled. She does have a history of anal canal squamous cell carcinoma. We discussed this exam and how she felt about missing it. She reports she was looking forward  "to the prospect of being to declare she was cancer free. When asked about her goals of care and whether she would want to pursue treatment should something be found, she reports \"I want twenty more years\".     When talking about general annual screenings and preventive care. She did not want any vaccines including Shingrix. She stated, \"I'm 82 years old and I haven't had shingles or any of those things, I don't want the vaccines now\". She did want Dexascan to screen for postmenopausal osteoporosis as well as lab work.     Patient denies fever, chills, headache, lightheadedness, dizziness, rhinorrhea, cough, congestion, shortness of breath, chest pain, palpitations, abdominal pain, n/v, diarrhea, constipation, change in appetite,dysuria, frequency, burning or pain with urination. She is feeling well today, and she does endorse insomnia. Staff and she both report her difficulty sleeping has been many years. She did not want melatonin or to discuss sleep further.     ALLERGIES:Darvon [propoxyphene], Ketoconazole, and Penicillins  PAST MEDICAL HISTORY:   Past Medical History:   Diagnosis Date     Anal cancer (H)      Endometriosis, site unspecified      History of malignant neoplasm of anus 7/26/2021     Periprosthetic fracture of right hip, subsequent encounter 7/26/2021     Thyroiditis, unspecified     Thryoiditis-resolved     PAST SURGICAL HISTORY:   has a past surgical history that includes surgical history of - ; Colonoscopy (N/A, 4/13/2017); appendectomy; Insert port vascular access (Right, 2/8/2018); and Arthroplasty hip (Right, 7/26/2018).  FAMILY HISTORY: family history includes Cancer in her maternal grandmother and sister; Heart Disease in her father; Uterine Cancer in her niece.  SOCIAL HISTORY:  reports that she has never smoked. She has never used smokeless tobacco. She reports that she does not drink alcohol and does not use drugs.    MEDICATIONS:  MED REC REQUIRED  Post Medication Reconciliation Status:  " "        Review of your medicines          Accurate as of July 13, 2023 11:59 PM. If you have any questions, ask your nurse or doctor.            CONTINUE these medicines which have NOT CHANGED      Dose / Directions   acetaminophen 500 MG tablet  Commonly known as: TYLENOL      Dose: 1,000 mg  Take 1,000 mg by mouth 3 times daily  Refills: 0     methocarbamol 500 MG tablet  Commonly known as: ROBAXIN      Dose: 500 mg  Take 500 mg by mouth 4 times daily  Refills: 0     polyethylene glycol 17 GM/Dose powder  Commonly known as: MIRALAX  Used for: Hip fracture, right, closed, initial encounter (H)      Dose: 17 g  Take 17 g by mouth daily  Quantity: 510 g  Refills: 0     Vitamin E 670 MG (1000 UT) Caps      Dose: 1 capsule  Take 1 capsule by mouth daily  Refills: 0            Case Management:  I have reviewed the care plan and MDS and do agree with the plan. Patient's desire to return to the community is not present. Information reviewed:  Medications, vital signs, orders, and nursing notes.    ROS:  10 point ROS of systems including Constitutional, Eyes, Respiratory, Cardiovascular, Gastroenterology, Genitourinary, Integumentary, Musculoskeletal, Psychiatric were all negative except for pertinent positives noted in my HPI.    Vitals:  /78   Pulse 74   Temp 97.2  F (36.2  C)   Resp 18   Ht 1.626 m (5' 4\")   Wt 51.7 kg (114 lb)   LMP  (LMP Unknown)   SpO2 94%   BMI 19.57 kg/m    Body mass index is 19.57 kg/m .  Exam:  GENERAL APPEARANCE:  Alert, in no distress  ENT:  Mouth and posterior oropharynx normal, moist mucous membranes  EYES:  EOM, conjunctivae, lids, pupils and irises normal, PERRL  RESP:  respiratory effort and palpation of chest normal, lungs clear to auscultation   CV:  Palpation and auscultation of heart done , regular rate and rhythm, no murmur, rub, or gallop, peripheral edema +1+ in BLE, no pitting  ABDOMEN:  normal bowel sounds, soft, nontender, no hepatosplenomegaly or other masses  M/S: "   Kyphosis present, ROM present all extremities, W/C - transfers self from wheelchair  SKIN:  hemosiderin staining BLE  NEURO:   Examination of sensation by touch normal  PSYCH:  oriented to person, place, situation - thought year 1940, then 2039, insight and judgement impaired, memory impaired     Lab/Diagnostic data:   Results for orders placed or performed in visit on 07/14/23   Magnesium     Status: Normal   Result Value Ref Range    Magnesium 2.0 1.7 - 2.3 mg/dL   Lipid panel reflex to direct LDL     Status: Normal   Result Value Ref Range    Cholesterol 148 <200 mg/dL    Triglycerides 75 <150 mg/dL    Direct Measure HDL 73 >=50 mg/dL    LDL Cholesterol Calculated 60 <=100 mg/dL    Non HDL Cholesterol 75 <130 mg/dL    Narrative    Cholesterol  Desirable:  <200 mg/dL    Triglycerides  Normal:  Less than 150 mg/dL  Borderline High:  150-199 mg/dL  High:  200-499 mg/dL  Very High:  Greater than or equal to 500 mg/dL    Direct Measure HDL  Female:  Greater than or equal to 50 mg/dL   Male:  Greater than or equal to 40 mg/dL    LDL Cholesterol  Desirable:  <100mg/dL  Above Desirable:  100-129 mg/dL   Borderline High:  130-159 mg/dL   High:  160-189 mg/dL   Very High:  >= 190 mg/dL    Non HDL Cholesterol  Desirable:  130 mg/dL  Above Desirable:  130-159 mg/dL  Borderline High:  160-189 mg/dL  High:  190-219 mg/dL  Very High:  Greater than or equal to 220 mg/dL   TSH with free T4 reflex     Status: Abnormal   Result Value Ref Range    TSH 26.40 (H) 0.30 - 4.20 uIU/mL   T4 free     Status: Abnormal   Result Value Ref Range    Free T4 0.73 (L) 0.90 - 1.70 ng/dL       ASSESSMENT/PLAN  (G30.1,  F02.B4) 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 She was able to report it was almost her birthday and she was planning celebration for the weekend with friends.  Plan:    -Check TSH with reflex T4 (last checked 2018)  -Reduce robaxin dosing from 1000 mg PO QID to 500 mg PO QUID    (C21.1) Malignant neoplasm of anal canal (H)  Comment: Completed radiation in 2018, missed repeat flex sig  Plan:   -Facility to assist with prep set up and rescheduling     (G89.4) Chronic pain syndrome  (Z87.81) Status post fracture of right hip  (W19.XXXD) Fall, subsequent encounter  Comment: Pt is on high doses of Robaxin, it appears Robaxin has been ordered since hip replacement in 2018 and increased in 2022 to 1000 mg QID from 500 mg QID. Discussed the short term intent of this medication and pt agreeable to wean dose. Pt would like icy hot cream ordered for topical pain management   Plan:   -Change Robaxin from 1000 mg PO QID to 500 mg PO QID, consider discontinuing altogether should pt tolerate  -Continue Tylenol 1000 mg TID  -Start IcyHot cream q 6 hours PRN to painful areas     (G47.00) Insomnia, unspecified type  Comment: Pt reports she is at her baseline and does not want to address. Does not think improved sleep would help with falling.   Plan:   -Consider melatonin should pt start to be bothered by insomnia     (R60.0) Lower extremity edema  Comment: Non pitting, appears r/t chronic venous insufficiency as she does have hemosiderin staining and thickened skin, no open areas, neg homans, swelling equal bilaterally. Pt declines stockings, report she does do ankle pumps  Plan:   -Pt would benefit from compression stockings, declined  -Continue movement/ankle pumps     Orders:  BMP, CBC (ordered by pharm), TSH with reflex T4, Dexascan for postmenopausal screening >65, LFTs, lipid panel, magnesium, decrease Robaxin from 1000 mg QID to 500 mg QID, icy hot    Addendum: Labs revealed elevated TSH 26.40, low T4 0.73, will start Levothyroxine 25 mg PO Q day and repeat TSH in 4 weeks.     Total time spent with patient visit at the [facility] nursing facility was 45 min including patient visit and review  of past records. Greater than 50% of total time spent with counseling and coordinating care due to history of falling.    Electronically signed by:  GRAHAM Yoo CNP

## 2023-07-18 ENCOUNTER — LAB REQUISITION (OUTPATIENT)
Dept: LAB | Facility: CLINIC | Age: 83
End: 2023-07-18
Payer: COMMERCIAL

## 2023-07-18 DIAGNOSIS — Z01.89 ENCOUNTER FOR OTHER SPECIFIED SPECIAL EXAMINATIONS: ICD-10-CM

## 2023-07-19 LAB
ANION GAP SERPL CALCULATED.3IONS-SCNC: 12 MMOL/L (ref 7–15)
BUN SERPL-MCNC: 18.9 MG/DL (ref 8–23)
CALCIUM SERPL-MCNC: 10 MG/DL (ref 8.8–10.2)
CHLORIDE SERPL-SCNC: 101 MMOL/L (ref 98–107)
CREAT SERPL-MCNC: 0.6 MG/DL (ref 0.51–0.95)
DEPRECATED HCO3 PLAS-SCNC: 25 MMOL/L (ref 22–29)
ERYTHROCYTE [DISTWIDTH] IN BLOOD BY AUTOMATED COUNT: 13.2 % (ref 10–15)
GFR SERPL CREATININE-BSD FRML MDRD: 88 ML/MIN/1.73M2
GLUCOSE SERPL-MCNC: 69 MG/DL (ref 70–99)
HCT VFR BLD AUTO: 42.7 % (ref 35–47)
HGB BLD-MCNC: 13.4 G/DL (ref 11.7–15.7)
MCH RBC QN AUTO: 32.7 PG (ref 26.5–33)
MCHC RBC AUTO-ENTMCNC: 31.4 G/DL (ref 31.5–36.5)
MCV RBC AUTO: 104 FL (ref 78–100)
PLATELET # BLD AUTO: 210 10E3/UL (ref 150–450)
POTASSIUM SERPL-SCNC: 4.5 MMOL/L (ref 3.4–5.3)
RBC # BLD AUTO: 4.1 10E6/UL (ref 3.8–5.2)
SODIUM SERPL-SCNC: 138 MMOL/L (ref 136–145)
WBC # BLD AUTO: 6.7 10E3/UL (ref 4–11)

## 2023-07-19 PROCEDURE — 80048 BASIC METABOLIC PNL TOTAL CA: CPT | Mod: ORL

## 2023-07-19 PROCEDURE — P9604 ONE-WAY ALLOW PRORATED TRIP: HCPCS | Mod: ORL

## 2023-07-19 PROCEDURE — 85027 COMPLETE CBC AUTOMATED: CPT | Mod: ORL

## 2023-07-19 PROCEDURE — 36415 COLL VENOUS BLD VENIPUNCTURE: CPT | Mod: ORL

## 2023-07-21 ENCOUNTER — TELEPHONE (OUTPATIENT)
Dept: GERIATRICS | Facility: CLINIC | Age: 83
End: 2023-07-21
Payer: COMMERCIAL

## 2023-07-21 NOTE — TELEPHONE ENCOUNTER
[facility] NON-EMERGENT VOICEMAIL ENCOUNTER    Elizabeth Taylor is a 83 year old  (1940), Voicemail Message received today regarding:   Unwitnessed fall    Disposition    Vm left on the after hours line stating that patient had a fall on NOC shift.  Patient stated that she fell off the toilet sideways and crawled back to her bed.  Patient was found laying on her fall mat next to bed.  No injuries noted.  Vitals stable.  No other information given.      Requests    FYI      Electronically signed by:   Kandy Floyd RN

## 2023-07-25 ENCOUNTER — PATIENT OUTREACH (OUTPATIENT)
Dept: GERIATRIC MEDICINE | Facility: CLINIC | Age: 83
End: 2023-07-25
Payer: COMMERCIAL

## 2023-07-27 ENCOUNTER — PATIENT OUTREACH (OUTPATIENT)
Dept: GERIATRIC MEDICINE | Facility: CLINIC | Age: 83
End: 2023-07-27
Payer: COMMERCIAL

## 2023-08-01 ENCOUNTER — TELEPHONE (OUTPATIENT)
Dept: GERIATRICS | Facility: CLINIC | Age: 83
End: 2023-08-01
Payer: COMMERCIAL

## 2023-08-01 NOTE — TELEPHONE ENCOUNTER
[facility] Geriatrics Triage Nurse Telephone Encounter    Provider: GRAHAM Gorman CNP  Facility: [facility]  Facility Type:  LTC      Allergies:    Allergies   Allergen Reactions    Darvon [Propoxyphene]      Had reaction in teen years    Ketoconazole Rash    Penicillins Hives     As a child        Reason for call: Nursing called to report that the pt had a fall this am and hit her head on the sink, there is a little bit of swelling and small bruising on the Rt forehead. No c/o of pain at this time, VS and Neuro's wnl, not on blood thinners, family aware.     Verbal Order/Direction given by Provider: Continue to monitor per facility protocol, call with any changes.     Provider giving Order:  MARIAH Burks    Verbal Order given to: Am floor nurse    Mercedez Owens RN

## 2023-08-03 ASSESSMENT — ACTIVITIES OF DAILY LIVING (ADL)
DEPENDENT_IADLS:: CLEANING;COOKING;LAUNDRY;SHOPPING;MEAL PREPARATION;MEDICATION MANAGEMENT;MONEY MANAGEMENT;TRANSPORTATION;INCONTINENCE

## 2023-08-03 NOTE — PROGRESS NOTES
[facility] Institutional Assessment     Institutional Assessment for Health Risk Assessment with Elizabeth Taylor completed on July 27, 2023 at [facility]    Type of residence:: Nursing home  Current living arrangement:: I live in a nursing home     Assessment completed with:: Patient, Care Team Member      Mental/Behavioral Health   Depression Screening: NA      Mental health DX:: Yes   Mental health DX how managed:: Medication    Falls Assessment:   Fallen 2 or more times in the past year?: Yes   Any fall with injury in the past year?: No    ADL/IADL Dependencies:   Dependent ADLs:: Ambulation-walker, Bathing, Dressing, Grooming, Incontinence, Transfers, Wheelchair-independent, Toileting  Dependent IADLs:: Cleaning, Cooking, Laundry, Shopping, Meal Preparation, Medication Management, Money Management, Transportation, Incontinence      Care Plan & Recommendations:   See Institutional Care Plan for detailed assessment information.    Obtained a copy of the facility care plan and MDS from facility electronic records. Requested of [facility] social worker to put this care coordinator on care conference attendee list.    Placed the Health Plan facility face sheet in the member's facility chart.    Follow-Up Plan: Member informed of future contact, plan to f/u with member with a 6 month assessment, attend 1 care conference annually, and will follow any hospitalizations or transitions. Care Coordinator contact information shared with member/family and facility, and encouraged to call this care coordinator with any questions or concerns at any time.     [place] care continuum providers: Please see Snapshot and Care Management Flowsheets for Specific details of care plan.    This CC note routed to PCP, Seun Smith MA [facility] Care Coordinator   [phone] - work cell phone   [phone] - fveq fax

## 2023-08-03 NOTE — PROGRESS NOTES
7-25-23  CC contacted facility to let them know that CC was coming for visit on 7/27/23  Natividad Thornton MA [facility] Coordinator   [phone] - work cell phone   [phone] - work fax

## 2023-08-04 ENCOUNTER — DOCUMENTATION ONLY (OUTPATIENT)
Dept: OTHER | Facility: CLINIC | Age: 83
End: 2023-08-04
Payer: COMMERCIAL

## 2023-08-09 NOTE — PLAN OF CARE
Problem: Patient Care Overview  Goal: Plan of Care/Patient Progress Review  Outcome: No Change  Vital signs:  Temp: 96.8  F (36  C) Temp src: Oral BP: 107/57   Heart Rate: 85 Resp: 19 SpO2: 95 % O2 Device: None (Room air)   A&Ox3, forgetful at times. Pain well managed overnight with Noropin 8 ml/hr & Lidocaine patch. IV ABX per order LR infusing at 100 ml/hr. Clear liquid diet, good appetite, no c/o N/V. R hip dressing c/d/i. CMS & Neuro intact. Voiding adequately. Loose stool x2. Stool sample collected & sent to lab. Positive for C. Diff. Trauma MD paged & notified. New order placed for Flagyl. Continue POC.              Complex Repair And Melolabial Flap Text: The defect edges were debeveled with a #15 scalpel blade.  The primary defect was closed partially with a complex linear closure.  Given the location of the remaining defect, shape of the defect and the proximity to free margins a melolabial flap was deemed most appropriate for complete closure of the defect.  Using a sterile surgical marker, an appropriate advancement flap was drawn incorporating the defect and placing the expected incisions within the relaxed skin tension lines where possible.    The area thus outlined was incised deep to adipose tissue with a #15 scalpel blade.  The skin margins were undermined to an appropriate distance in all directions utilizing iris scissors.

## 2023-08-15 ENCOUNTER — LAB REQUISITION (OUTPATIENT)
Dept: LAB | Facility: CLINIC | Age: 83
End: 2023-08-15
Payer: COMMERCIAL

## 2023-08-15 DIAGNOSIS — E03.9 HYPOTHYROIDISM, UNSPECIFIED: ICD-10-CM

## 2023-08-16 LAB — TSH SERPL DL<=0.005 MIU/L-ACNC: 17.7 UIU/ML (ref 0.3–4.2)

## 2023-08-16 PROCEDURE — P9604 ONE-WAY ALLOW PRORATED TRIP: HCPCS | Mod: ORL

## 2023-08-16 PROCEDURE — 84443 ASSAY THYROID STIM HORMONE: CPT | Mod: ORL

## 2023-08-16 PROCEDURE — 36415 COLL VENOUS BLD VENIPUNCTURE: CPT | Mod: ORL

## 2023-08-17 ENCOUNTER — TELEPHONE (OUTPATIENT)
Dept: GERIATRICS | Facility: CLINIC | Age: 83
End: 2023-08-17
Payer: COMMERCIAL

## 2023-08-17 RX ORDER — LEVOTHYROXINE SODIUM 50 UG/1
50 TABLET ORAL DAILY
COMMUNITY

## 2023-08-17 NOTE — TELEPHONE ENCOUNTER
[facility] Geriatrics Lab Note     Provider: Seun Salazar PA-C  Facility: [facility]  Facility Type:  LTC    Allergies   Allergen Reactions    Darvon [Propoxyphene]      Had reaction in teen years    Ketoconazole Rash    Penicillins Hives     As a child       Labs Reviewed by provider: TSH level of 17.70.  Currently taking Levothyroxine 25 mcg po daily.       Verbal Order/Direction given by Provider: Check Free T4 on next lab day.  Increase Levothyroxine to 50 mcg po daily.  Recheck TSH and Free T4 in 6 weeks.      Provider giving Order:  Seun Salazar PA-C    Verbal Order given to: Bora Floyd RN

## 2023-08-22 ENCOUNTER — LAB REQUISITION (OUTPATIENT)
Dept: LAB | Facility: CLINIC | Age: 83
End: 2023-08-22
Payer: COMMERCIAL

## 2023-08-23 LAB — T4 FREE SERPL-MCNC: 0.92 NG/DL (ref 0.9–1.7)

## 2023-08-23 PROCEDURE — P9604 ONE-WAY ALLOW PRORATED TRIP: HCPCS | Mod: ORL | Performed by: PHYSICIAN ASSISTANT

## 2023-08-23 PROCEDURE — 36415 COLL VENOUS BLD VENIPUNCTURE: CPT | Mod: ORL | Performed by: PHYSICIAN ASSISTANT

## 2023-08-23 PROCEDURE — 84439 ASSAY OF FREE THYROXINE: CPT | Mod: ORL | Performed by: PHYSICIAN ASSISTANT

## 2023-08-29 ENCOUNTER — TELEPHONE (OUTPATIENT)
Dept: GERIATRICS | Facility: CLINIC | Age: 83
End: 2023-08-29
Payer: COMMERCIAL

## 2023-08-29 RX ORDER — MINERAL OIL/HYDROPHIL PETROLAT
OINTMENT (GRAM) TOPICAL 2 TIMES DAILY
COMMUNITY
Start: 2023-08-29

## 2023-08-29 NOTE — TELEPHONE ENCOUNTER
[facility] Geriatrics Triage Nurse Telephone Encounter    Provider: Rosenstein, Benjamin MD  Facility: [facility]  Facility Type:  [place]    Caller: Eunice  Call Back Number: [phone]    Allergies:    Allergies   Allergen Reactions    Darvon [Propoxyphene]      Had reaction in teen years    Ketoconazole Rash    Penicillins Hives     As a child        Reason for call: Nurse is calling to report that patient 2+ bilateral LE edema.  She does not wear any compression.  Nurse is also stating that patient's legs and back of her neck are red, dry, and scaly.  No fever or pain noted.      Verbal Order/Direction given by Provider: Apply Aquaphor to bilateral LE's and back of neck BID.  Kyle stockings---on AM and off HS.      Provider giving Order:  Rosenstein, Benjamin MD    Verbal Order given to: Cyndi Zamora RN

## 2023-09-02 ENCOUNTER — TRANSFERRED RECORDS (OUTPATIENT)
Dept: HEALTH INFORMATION MANAGEMENT | Facility: CLINIC | Age: 83
End: 2023-09-02
Payer: COMMERCIAL

## 2023-09-02 ENCOUNTER — TELEPHONE (OUTPATIENT)
Dept: GERIATRICS | Facility: CLINIC | Age: 83
End: 2023-09-02
Payer: COMMERCIAL

## 2023-09-02 NOTE — TELEPHONE ENCOUNTER
[facility] TRIAGE ENCOUNTER    Chief Complaint   Patient presents with    Wrist Pain       Elizabeth Taylor is a 83 year old  (1940), Nurse called today to report: patient had a fall yesterday. She has visible bruising on her hand and lower arm. She is able to move her fingers, when she turns her over she has increased pain. Pulses good. Took tylenol with relief.    ASSESSMENT/PLAN  Please obtain an xray of her lower arm.    Electronically signed by:   Crissy Glass NP

## 2023-09-05 ENCOUNTER — NURSING HOME VISIT (OUTPATIENT)
Dept: GERIATRICS | Facility: CLINIC | Age: 83
End: 2023-09-05
Payer: COMMERCIAL

## 2023-09-05 ENCOUNTER — TELEPHONE (OUTPATIENT)
Dept: GERIATRICS | Facility: CLINIC | Age: 83
End: 2023-09-05
Payer: COMMERCIAL

## 2023-09-05 DIAGNOSIS — S62.102A WRIST FRACTURE, LEFT, CLOSED, INITIAL ENCOUNTER: Primary | ICD-10-CM

## 2023-09-05 PROCEDURE — 99207 PR NO CHARGE LOS: CPT | Performed by: PHYSICIAN ASSISTANT

## 2023-09-05 NOTE — PROGRESS NOTES
Ortho Nursing home visit    Elizabeth Taylor is a 83 year old [facility]    Patient is seen today for injury to left UE, patient has had a number of falls, as of late, staff noticed swelling in the wrist, x-rays were taken on-site .      Past Medical History:   Diagnosis Date    Anal cancer (H)     Endometriosis, site unspecified     History of malignant neoplasm of anus 7/26/2021    Periprosthetic fracture of right hip, subsequent encounter 7/26/2021    Thyroiditis, unspecified     Thryoiditis-resolved      Past Surgical History:   Procedure Laterality Date    APPENDECTOMY      as a child    ARTHROPLASTY HIP Right 7/26/2018    Procedure: ARTHROPLASTY HIP;  Right Hip Hemiarthroplasty;  Surgeon: Zaire Phan MD;  Location: [facility] OR    COLONOSCOPY N/A 4/13/2017    Procedure: COLONOSCOPY;  Surgeon: Juan Dos Santos MD;  Location: [facility] GI    INSERT PORT VASCULAR ACCESS Right 2/8/2018    Procedure: INSERT PORT VASCULAR ACCESS;  Place Single Lumen Venous Chest Port Placement;  Surgeon: Zaire Moreira PA-C;  Location: [facility] OR    SURGICAL HISTORY OF -       endometriosis        Allergies   Allergen Reactions    Darvon [Propoxyphene]      Had reaction in teen years    Ketoconazole Rash    Penicillins Hives     As a child      LMP  (LMP Unknown)      Exam: Seen today in room, eating dinner, patient has marked kyphosis , and has trouble seeing straight due to t-spine curve.     Exam of the left wrist is not impressive, min pain, on palpation, some swelling, using the hand W/O difficulty for ADL's.       X-rays show : sub/acute distal radius fracture, non-displaced,     ASSESSMENT / PLAN: I discussed treatment options, and we have elected to proceed with a removable wrist splint, and plan will be to wear for 4 weeks, then PRN only for pain,    I would not suggest x-rays unless changes in condition at 4 weeks,     The staff has my # should any questions  arise.    [id number]          Dotty OPA-C  [phone] Cell

## 2023-09-05 NOTE — TELEPHONE ENCOUNTER
[facility] Geriatrics Triage Nurse Telephone Encounter    Provider: Seun Salazar PA-C  Facility: [facility]  Facility Type:  LTC    Caller: Cyndi   Call Back Number: [phone]    Allergies:    Allergies   Allergen Reactions    Darvon [Propoxyphene]      Had reaction in teen years    Ketoconazole Rash    Penicillins Hives     As a child          Reason for call:   Per nursing pain is under control.       Verbal Order/Direction given by Provider: Wt bearing as tolerated, PT/OT to eval and treat for possible walker change. Ortho will be over to splint the arm.     Provider giving Order:  Seun Salazar PA-C    Verbal Order given to: Cyndi Owens RN

## 2023-09-06 DIAGNOSIS — Z78.0 ASYMPTOMATIC MENOPAUSAL STATE: ICD-10-CM

## 2023-09-06 DIAGNOSIS — M41.34 THORACOGENIC SCOLIOSIS OF THORACIC REGION: Primary | ICD-10-CM

## 2023-09-11 ENCOUNTER — ASSISTED LIVING VISIT (OUTPATIENT)
Dept: GERIATRICS | Facility: CLINIC | Age: 83
End: 2023-09-11
Payer: COMMERCIAL

## 2023-09-11 VITALS
HEIGHT: 64 IN | BODY MASS INDEX: 18.61 KG/M2 | HEART RATE: 62 BPM | TEMPERATURE: 98.2 F | WEIGHT: 109 LBS | SYSTOLIC BLOOD PRESSURE: 139 MMHG | RESPIRATION RATE: 18 BRPM | DIASTOLIC BLOOD PRESSURE: 82 MMHG | OXYGEN SATURATION: 96 %

## 2023-09-11 DIAGNOSIS — F51.04 PSYCHOPHYSIOLOGICAL INSOMNIA: ICD-10-CM

## 2023-09-11 DIAGNOSIS — M80.00XD AGE-RELATED OSTEOPOROSIS WITH CURRENT PATHOLOGICAL FRACTURE WITH ROUTINE HEALING, SUBSEQUENT ENCOUNTER: ICD-10-CM

## 2023-09-11 DIAGNOSIS — F39 MOOD DISORDER (H): ICD-10-CM

## 2023-09-11 DIAGNOSIS — G31.84 MILD COGNITIVE IMPAIRMENT: Primary | ICD-10-CM

## 2023-09-11 DIAGNOSIS — E03.9 ACQUIRED HYPOTHYROIDISM: ICD-10-CM

## 2023-09-11 DIAGNOSIS — C21.1 MALIGNANT NEOPLASM OF ANAL CANAL (H): ICD-10-CM

## 2023-09-11 DIAGNOSIS — M40.14 OTHER SECONDARY KYPHOSIS, THORACIC REGION: ICD-10-CM

## 2023-09-11 DIAGNOSIS — S62.102D CLOSED FRACTURE OF LEFT WRIST WITH ROUTINE HEALING, SUBSEQUENT ENCOUNTER: ICD-10-CM

## 2023-09-11 DIAGNOSIS — M41.9 KYPHOSCOLIOSIS: ICD-10-CM

## 2023-09-11 PROCEDURE — 99349 HOME/RES VST EST MOD MDM 40: CPT | Performed by: FAMILY MEDICINE

## 2023-09-11 RX ORDER — GABAPENTIN 300 MG/1
300 CAPSULE ORAL 3 TIMES DAILY
COMMUNITY
Start: 2023-08-14 | End: 2023-10-23

## 2023-09-11 NOTE — PROGRESS NOTES
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     Seen today to follow-up recent fall and wrist fracture as well as general follow-up after [facility] of prior colleague who had primarily been seeing her.    Today she says she is doing well.  She did note she has been having more falls recently.  Because of this, her dose of Robaxin had previously been decreased from 1000 mg 4 times daily to 500 mg 4 times daily.  She says she has had 3 falls since last December.  Most recently when she fell, she was found to have a left wrist fracture.  She was seen by our orthopedics colleague MARTINEZ Trivedi who recommended regular use of wrist splint to wear for 4 weeks and then as needed for pain.  Notably she is not wearing it during our encounter and seems to forget to put it on at times.  She does not have significant pain of her wrist and seems to be moving it without concern.    Much of our time spent reviewing some of her history as well as recommendations around preventing falls in the future.  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  She is \"interested in experiments\" and likes to know is going on with her care and any changes made around it.  She did note difficulty with sleep that is been ongoing for many years.  We will often wake at night with thoughts going through her mind, thinking about various things.  Does not necessarily see this as an issue.    She does have a history of anal cancer for which she has been followed with colorectal surgery.  Had recently seen notes from them about scheduling.  She is supposed to undergo repeat flex sig " "this year as part of an ongoing follow-up.  Sounds as though if she has no recurrence, may be able to discontinue regular monitoring.    She enjoys spending time with various activities here.  Greatly enjoys the singing groups at the facility as well as some of the other group activities.    Allergies, and PMH/PSH reviewed in [facility] today.  REVIEW OF SYSTEMS:      Objective:   /82   Pulse 62   Temp 98.2  F (36.8  C)   Resp 18   Ht 1.626 m (5' 4\")   Wt 49.4 kg (109 lb)   LMP  (LMP Unknown)   SpO2 96%   BMI 18.71 kg/m      GENERAL APPEARANCE: Seated in chair comfortably, NAD, somnolent (quickly falls asleep when we stop talking)   HENT:  NCAT, moderately [place], poor dentition with multiple absent and cracked teeth.  EYES:  Conjunctiva clear, anicteric, EOMI, PERRL  PULM  Normal WOB on RA, lungs CTAB, no wheezes or crackles, restricted air movement due to kyphosis  CV:  RRR, S1/S2 normal, no murmurs; bilateral LE edema  ABDOMEN: Abdomen soft, not tender, not distended, BS normal and active throughout   M/S:Significant thoracic kyphosis     NEURO: Alert, answering questions appropriately, circuitous thought processes; CN II-XII grossly intact, ambulates with 2ww or propels in wheel chair  PSYCH: Mood anxious with bright affect, speech rapid at times, insight reasonable  --Room significantly unkept with papers and books piled on surfaces including on her bed.     Recent labs in [facility] reviewed by me today.     Assessment/Plan:    (G31.84) Mild cognitive impairment  (primary encounter diagnosis)  Comment: Cognition appears relatively intact, though likely mild impairment particular related to insomnia and likely mood disorder (suspect anxiety).  Additionally, suspect effect of medications and she may have had improvement since changes including initiation of levothyroxine and decreasing her methocarbamol dose.  She is able to ambulate independently and attends various activities at the facility.  Plan:   -Continue " supportive cares and facility  -Consider cognitive evaluation at future visit encourage stimulating activities    (F39) Mood disorder (H)  Comment: Unclear, but interaction seems consistent with anxiety.  Also consideration for hoarding disorder given the state of her room.  Plan:   -No changes at this time    (F51.04) Psychophysiological insomnia  Comment: Related to the above.  She denies any significant concerns with this.  She is significantly somnolent and easily falls asleep during our encounter when we stop talking.  Plan:   -Pending course, consider medications for mood which may help with this    (M80.00XD) Age-related osteoporosis with current pathological fracture with routine healing, subsequent encounter  Comment: Evidence by prior vertebral fractures with significant kyphosis on exam.  Additionally recent fall with wrist fracture consistent with this as well.  Repeating TSH given finding of hypothyroidism.  Will evaluate other labs at that time as well  Plan:   -CMP, CBC, vitamin D, PTH with labs 9/27/2023    (S62.102D) Closed fracture of left wrist with routine healing, subsequent encounter  Comment: Related to recent fall and consistent with osteoporosis.  Seen by orthopedics colleague with recommendation for regular splinting.  Reminder of this again today is seems as though she is forgotten to wear splint at times.  Not currently having any significant pain.  Given ongoing falls, we will decrease methocarbamol to 500 mg just at night, she primarily complains of spasms during sleep.  Additionally, may benefit from decreasing/discontinuing gabapentin.  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  She does ambulate fairly well.  Does have restricted respirations because of this.  No dysphagia.    (C21.1) Malignant neoplasm of anal canal (H)  Comment: Reviewed today.   Plan for repeat flexible sigmoidoscopy in November as part of monitoring.  She does wish to proceed with this.  Given her function and moderate frailty, does have significant risks, though relatively low risk procedure.    (E03.9) Acquired hypothyroidism  Comment: Recently noted on labs with significantly elevated TSH.  Levothyroxine was added and increased to 50 mcg daily.  Follow-up TSH already ordered for 9/27/2023.  Will adjust as necessary.    MED REC REQUIRED  Post Medication Reconciliation Status: discharge medications reconciled and changed, per note/orders      Orders:  [x] Robaxin to at night only  [x] CMP, CBC w/Diff, PTH, Vit D 9/27/23 (TSH already ordered)  [x] OT hand therapy    Electronically signed by:     Benjamin Rosenstein, MD, MA  [facility]    This note was completed with the assistance of dictation software. Typos and word substitution-errors are expected and unintended.      43 MINUTES SPENT BY ME on the date of service doing chart review, history, exam, documentation & further activities per the note.

## 2023-09-11 NOTE — LETTER
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     Seen today to follow-up recent fall and wrist fracture as well as general follow-up after detention of prior colleague who had primarily been seeing her.    Today she says she is doing well.  She did note she has been having more falls recently.  Because of this, her dose of Robaxin had previously been decreased from 1000 mg 4 times daily to 500 mg 4 times daily.  She says she has had 3 falls since last December.  Most recently when she fell, she was found to have a left wrist fracture.  She was seen by our orthopedics colleague MARTINEZ Trivedi who recommended regular use of wrist splint to wear for 4 weeks and then as needed for pain.  Notably she is not wearing it during our encounter and seems to forget to put it on at times.  She does not have significant pain of her wrist and seems to be moving it without concern.    Much of our time spent reviewing some of her history as well as recommendations around preventing falls in the future.  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  She is \"interested in experiments\" and likes to know is going on with her care and any changes made around it.  She did note difficulty with sleep that is been ongoing for many years.  We will often wake at night with thoughts going through her mind, thinking about various things.  Does not necessarily see this as an issue.    She does have a history of anal cancer for which she has been followed with colorectal " "surgery.  Had recently seen notes from them about scheduling.  She is supposed to undergo repeat flex sig November of this year as part of an ongoing follow-up.  Sounds as though if she has no recurrence, may be able to discontinue regular monitoring.    She enjoys spending time with various activities here.  Greatly enjoys the singing groups at the facility as well as some of the other group activities.    Allergies, and PMH/PSH reviewed in [facility] today.  REVIEW OF SYSTEMS:      Objective:   /82   Pulse 62   Temp 98.2  F (36.8  C)   Resp 18   Ht 1.626 m (5' 4\")   Wt 49.4 kg (109 lb)   LMP  (LMP Unknown)   SpO2 96%   BMI 18.71 kg/m      GENERAL APPEARANCE: Seated in chair comfortably, NAD, somnolent (quickly falls asleep when we stop talking)   HENT:  NCAT, moderately [place], poor dentition with multiple absent and cracked teeth.  EYES:  Conjunctiva clear, anicteric, EOMI, PERRL  PULM  Normal WOB on RA, lungs CTAB, no wheezes or crackles, restricted air movement due to kyphosis  CV:  RRR, S1/S2 normal, no murmurs; bilateral LE edema  ABDOMEN: Abdomen soft, not tender, not distended, BS normal and active throughout   M/S:Significant thoracic kyphosis     NEURO: Alert, answering questions appropriately, circuitous thought processes; CN II-XII grossly intact, ambulates with 2ww or propels in wheel chair  PSYCH: Mood anxious with bright affect, speech rapid at times, insight reasonable  --Room significantly unkept with papers and books piled on surfaces including on her bed.     Recent labs in [facility] reviewed by me today.     Assessment/Plan:    (G31.84) Mild cognitive impairment  (primary encounter diagnosis)  Comment: Cognition appears relatively intact, though likely mild impairment particular related to insomnia and likely mood disorder (suspect anxiety).  Additionally, suspect effect of medications and she may have had improvement since changes including initiation of levothyroxine and decreasing her " methocarbamol dose.  She is able to ambulate independently and attends various activities at the facility.  Plan:   -Continue supportive cares and facility  -Consider cognitive evaluation at future visit encourage stimulating activities    (F39) Mood disorder (H)  Comment: Unclear, but interaction seems consistent with anxiety.  Also consideration for hoarding disorder given the state of her room.  Plan:   -No changes at this time    (F51.04) Psychophysiological insomnia  Comment: Related to the above.  She denies any significant concerns with this.  She is significantly somnolent and easily falls asleep during our encounter when we stop talking.  Plan:   -Pending course, consider medications for mood which may help with this    (M80.00XD) Age-related osteoporosis with current pathological fracture with routine healing, subsequent encounter  Comment: Evidence by prior vertebral fractures with significant kyphosis on exam.  Additionally recent fall with wrist fracture consistent with this as well.  Repeating TSH given finding of hypothyroidism.  Will evaluate other labs at that time as well  Plan:   -CMP, CBC, vitamin D, PTH with labs 9/27/2023    (S62.102D) Closed fracture of left wrist with routine healing, subsequent encounter  Comment: Related to recent fall and consistent with osteoporosis.  Seen by orthopedics colleague with recommendation for regular splinting.  Reminder of this again today is seems as though she is forgotten to wear splint at times.  Not currently having any significant pain.  Given ongoing falls, we will decrease methocarbamol to 500 mg just at night, she primarily complains of spasms during sleep.  Additionally, may benefit from decreasing/discontinuing gabapentin.  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  She does ambulate fairly well.  Does have  restricted respirations because of this.  No dysphagia.    (C21.1) Malignant neoplasm of anal canal (H)  Comment: Reviewed today.  Plan for repeat flexible sigmoidoscopy in November as part of monitoring.  She does wish to proceed with this.  Given her function and moderate frailty, does have significant risks, though relatively low risk procedure.    (E03.9) Acquired hypothyroidism  Comment: Recently noted on labs with significantly elevated TSH.  Levothyroxine was added and increased to 50 mcg daily.  Follow-up TSH already ordered for 9/27/2023.  Will adjust as necessary.    MED REC REQUIRED  Post Medication Reconciliation Status: discharge medications reconciled and changed, per note/orders      Orders:  [x] Robaxin to at night only  [x] CMP, CBC w/Diff, PTH, Vit D 9/27/23 (TSH already ordered)  [x] OT hand therapy    Electronically signed by:     Benjamin Rosenstein, MD, MA  [facility] Faculty    This note was completed with the assistance of dictation software. Typos and word substitution-errors are expected and unintended.      43 MINUTES SPENT BY ME on the date of service doing chart review, history, exam, documentation & further activities per the note.          Sincerely,        Benjamin Rosenstein, MD

## 2023-09-23 ENCOUNTER — TELEPHONE (OUTPATIENT)
Dept: GERIATRICS | Facility: CLINIC | Age: 83
End: 2023-09-23
Payer: COMMERCIAL

## 2023-09-23 NOTE — TELEPHONE ENCOUNTER
Nursing called this on-call provider today to report that Elizabeth has had at least 2 falls in the 1 last night she fell on her right hand and having a lot of discomfort.  Staff are unable to touch the hand as care will not allow them to.  Her second fall was just prior to the facility calling on-call.    Okay to send him to the emergency room for evaluation of the right hand/arm due to falling    GRAHAM Hughes CNP

## 2023-09-26 ENCOUNTER — LAB REQUISITION (OUTPATIENT)
Dept: LAB | Facility: CLINIC | Age: 83
End: 2023-09-26
Payer: COMMERCIAL

## 2023-09-26 DIAGNOSIS — M81.0 AGE-RELATED OSTEOPOROSIS WITHOUT CURRENT PATHOLOGICAL FRACTURE: ICD-10-CM

## 2023-09-28 ENCOUNTER — DOCUMENTATION ONLY (OUTPATIENT)
Dept: GERIATRICS | Facility: CLINIC | Age: 83
End: 2023-09-28
Payer: COMMERCIAL

## 2023-09-28 ENCOUNTER — TELEPHONE (OUTPATIENT)
Dept: GERIATRICS | Facility: CLINIC | Age: 83
End: 2023-09-28
Payer: COMMERCIAL

## 2023-09-28 RX ORDER — OLANZAPINE 7.5 MG/1
7.5 TABLET, FILM COATED ORAL EVERY 8 HOURS PRN
COMMUNITY
Start: 2023-09-28 | End: 2023-10-05

## 2023-09-28 NOTE — TELEPHONE ENCOUNTER
[facility] Geriatrics Triage Nurse Telephone Encounter    Provider: Seun Salazar PA-C  Facility: [facility]  Facility Type:  LTC    Caller: Elizabeth  Call Back Number: [phone]    Allergies:    Allergies   Allergen Reactions    Darvon [Propoxyphene]      Had reaction in teen years    Ketoconazole Rash    Penicillins Hives     As a child        Reason for call: Nurse is calling to clarify the stop date for the PRN Zyprexa order from the hospital.      Verbal Order/Direction given by Provider: Zyprexa 5mg Q 8 hours PRN x 7 days.      Provider giving Order:  Seun Salazar PA-C    Verbal Order given to: Remigio Zamora RN

## 2023-09-29 ENCOUNTER — NURSING HOME VISIT (OUTPATIENT)
Dept: GERIATRICS | Facility: CLINIC | Age: 83
End: 2023-09-29
Payer: COMMERCIAL

## 2023-09-29 VITALS
HEIGHT: 64 IN | RESPIRATION RATE: 18 BRPM | DIASTOLIC BLOOD PRESSURE: 74 MMHG | SYSTOLIC BLOOD PRESSURE: 131 MMHG | OXYGEN SATURATION: 95 % | HEART RATE: 70 BPM | BODY MASS INDEX: 18.61 KG/M2 | TEMPERATURE: 97.9 F | WEIGHT: 109 LBS

## 2023-09-29 DIAGNOSIS — N30.00 ACUTE CYSTITIS WITHOUT HEMATURIA: ICD-10-CM

## 2023-09-29 DIAGNOSIS — G31.84 MILD COGNITIVE IMPAIRMENT: ICD-10-CM

## 2023-09-29 DIAGNOSIS — S62.102D CLOSED FRACTURE OF LEFT WRIST WITH ROUTINE HEALING, SUBSEQUENT ENCOUNTER: ICD-10-CM

## 2023-09-29 DIAGNOSIS — R41.0 DELIRIUM: ICD-10-CM

## 2023-09-29 DIAGNOSIS — S42.295D OTHER CLOSED NONDISPLACED FRACTURE OF PROXIMAL END OF LEFT HUMERUS WITH ROUTINE HEALING, SUBSEQUENT ENCOUNTER: ICD-10-CM

## 2023-09-29 DIAGNOSIS — R29.6 RECURRENT FALLS: Primary | ICD-10-CM

## 2023-09-29 PROBLEM — E03.9 HYPOTHYROIDISM: Status: ACTIVE | Noted: 2023-09-24

## 2023-09-29 PROBLEM — R41.3 MEMORY IMPAIRMENT: Status: ACTIVE | Noted: 2023-09-23

## 2023-09-29 PROBLEM — N39.0 URINARY TRACT INFECTION WITHOUT HEMATURIA: Status: ACTIVE | Noted: 2023-09-26

## 2023-09-29 PROBLEM — S72.001A CLOSED FRACTURE OF NECK OF RIGHT FEMUR (H): Status: ACTIVE | Noted: 2018-08-21

## 2023-09-29 PROBLEM — Z96.649 HISTORY OF TOTAL HIP REPLACEMENT: Status: ACTIVE | Noted: 2018-11-30

## 2023-09-29 PROBLEM — K62.7 RADIATION PROCTITIS: Status: ACTIVE | Noted: 2022-04-20

## 2023-09-29 PROBLEM — M80.00XA OSTEOPOROSIS WITH PATHOLOGICAL FRACTURE: Status: ACTIVE | Noted: 2023-09-25

## 2023-09-29 PROBLEM — S42.209A CLOSED FRACTURE OF UPPER END OF HUMERUS: Status: ACTIVE | Noted: 2023-09-23

## 2023-09-29 PROBLEM — N30.90 CYSTITIS: Status: ACTIVE | Noted: 2023-09-24

## 2023-09-29 PROCEDURE — 99310 SBSQ NF CARE HIGH MDM 45: CPT | Performed by: PHYSICIAN ASSISTANT

## 2023-09-29 NOTE — LETTER
9/29/2023        RE: Elizabeth Taylor  [facility]  [address] [address]        [facility] GERIATRICS    Chief Complaint   Patient presents with     Hospital F/U     HPI:  Elizabeth Taylor is a 83 year old  (1940), who is being seen today for an episodic care visit at: [facility] ([facility]) [[id number]].     In brief, pt is a 84yo female with PMHx MCI, osteoporosis, anal cancer who is being seen as an episodic visit. Pt has been having increased falls recently, most recently 9/2 when she sustained a distal radius fracture. She was seen by geriatric orthopedics who recommended removable splint to be worn during daytime for minimum 4 weeks then PRN. Recently had her robaxin changed to at night only in an effort to prevent recurrent falls. Unfortunately, sustained three falls within 24h period and developed left upper arm pain prompting ED referral on 9/23. She was found to have a comminuted proximal humerus fracture, seen in consult with orthopedics who recommended nonoperative management and sling for comfort. She was found to have an acute E.coli UTI, treated with IV ceftriaxone. Hospitalization was complicated by development of delirium requiring PRN zyprexa. Following therapy evaluations, she was discharged back to long-term care with therapy services ordered.    Today, pt is seen as hospital follow-up. She has just finished working with therapies, nursing staff report she is noncompliant with use of the sling or non-weight bearing status. Clarified she is weight-bearing as tolerated and sling for comfort. They note she does not wear the sling nor the wrist splint for the recent wrist injury. 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 Today, she denies pain. She also denies constipation, has mildly loose stools. Appetite is adequate.    Allergies, and PMH/PSH reviewed in [employer] today.  REVIEW OF SYSTEMS:  4 point ROS including  "Respiratory, CV, GI and , other than that noted in the HPI,  is negative    Objective:   /74   Pulse 70   Temp 97.9  F (36.6  C)   Resp 18   Ht 1.626 m (5' 4\")   Wt 49.4 kg (109 lb)   LMP  (LMP Unknown)   SpO2 95%   BMI 18.71 kg/m    GEN: well-developed, thin female, appears comfortable  HEENT: NCAT, EOM intact bilaterally, sclera clear, conjunctiva normal, nose & mouth patent, mucous membranes moist  CHEST: lungs CTA bilaterally, no increased work of breathing, no wheeze, crackles, rhonchi  HEART: RRR, S1 & S2  MSK: AROM bilateral UE/LE, LUE in sling, pt is observed to move left upper extremity and bear weight without obvious pain; pedal & radial pulses 2+ bilaterally  NEURO: awake, alert. CN II-XII grossly intact. Sensation grossly intact to light touch.   SKIN: warm & dry without rash, no pedal edema    Labs:  Reviewed in Epic.  Assessment/Plan:    Recurrent falls  Physical deconditioning  Impaired mobility and ADLs  Increased frequency of falls, pt often forgetful with using assistive devices and difficult to redirect per staff.  -PT/OT    Left proximal humerus fx  Pain appears controlled today.  -WBAT to LUE, sling PRN comfort  -Pain control with scheduled tylenol, PRN tramadol, PRN robaxin (reordered at [facility])  -PT/OT as above  -Follow-up with orthopedics in 6 weeks    UTI, E.coli  Is s/p treatment with IV ceftriaxone. No evidence of recurrence.    Delirium, resolved  Hospital-acquired, started on PRN zyprexa.  -Added end date of zyprexa for 7d    MED REC REQUIRED  Post Medication Reconciliation Status: patient was not discharged from an inpatient facility or TCU    Spent 45min on encounter including review of prior records, hospitalization, and pt visit.    Electronically signed by: Seun Salazar PA-C          Sincerely,        Seun Salazar PA-C      "

## 2023-10-03 NOTE — PROGRESS NOTES
[facility] GERIATRICS    Chief Complaint   Patient presents with    Hospital F/U     HPI:  Elizabeth Taylor is a 83 year old  (1940), who is being seen today for an episodic care visit at: [facility] ([facility]) [[id number]].     In brief, pt is a 82yo female with PMHx MCI, osteoporosis, anal cancer who is being seen as an episodic visit. Pt has been having increased falls recently, most recently 9/2 when she sustained a distal radius fracture. She was seen by geriatric orthopedics who recommended removable splint to be worn during daytime for minimum 4 weeks then PRN. Recently had her robaxin changed to at night only in an effort to prevent recurrent falls. Unfortunately, sustained three falls within 24h period and developed left upper arm pain prompting ED referral on 9/23. She was found to have a comminuted proximal humerus fracture, seen in consult with orthopedics who recommended nonoperative management and sling for comfort. She was found to have an acute E.coli UTI, treated with IV ceftriaxone. Hospitalization was complicated by development of delirium requiring PRN zyprexa. Following therapy evaluations, she was discharged back to long-term care with therapy services ordered.    Today, pt is seen as hospital follow-up. She has just finished working with therapies, nursing staff report she is noncompliant with use of the sling or non-weight bearing status. Clarified she is weight-bearing as tolerated and sling for comfort. They note she does not wear the sling nor the wrist splint for the recent wrist injury. 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 Today, she denies pain. She also denies constipation, has mildly loose stools. Appetite is adequate.    Allergies, and PMH/PSH reviewed in EPIC today.  REVIEW OF SYSTEMS:  4 point ROS including Respiratory, CV, GI and , other than that noted in the HPI,  is negative    Objective:   /74   Pulse 70    "Temp 97.9  F (36.6  C)   Resp 18   Ht 1.626 m (5' 4\")   Wt 49.4 kg (109 lb)   LMP  (LMP Unknown)   SpO2 95%   BMI 18.71 kg/m    GEN: well-developed, thin female, appears comfortable  HEENT: NCAT, EOM intact bilaterally, sclera clear, conjunctiva normal, nose & mouth patent, mucous membranes moist  CHEST: lungs CTA bilaterally, no increased work of breathing, no wheeze, crackles, rhonchi  HEART: RRR, S1 & S2  MSK: AROM bilateral UE/LE, LUE in sling, pt is observed to move left upper extremity and bear weight without obvious pain; pedal & radial pulses 2+ bilaterally  NEURO: awake, alert. CN II-XII grossly intact. Sensation grossly intact to light touch.   SKIN: warm & dry without rash, no pedal edema    Labs:  Reviewed in Epic.  Assessment/Plan:    Recurrent falls  Physical deconditioning  Impaired mobility and ADLs  Increased frequency of falls, pt often forgetful with using assistive devices and difficult to redirect per staff.  -PT/OT    Left proximal humerus fx  Pain appears controlled today.  -WBAT to LUE, sling PRN comfort  -Pain control with scheduled tylenol, PRN tramadol, PRN robaxin (reordered at [facility])  -PT/OT as above  -Follow-up with orthopedics in 6 weeks    UTI, E.coli  Is s/p treatment with IV ceftriaxone. No evidence of recurrence.    Delirium, resolved  Hospital-acquired, started on PRN zyprexa.  -Added end date of zyprexa for 7d    MED REC REQUIRED  Post Medication Reconciliation Status: patient was not discharged from an inpatient facility or TCU    Spent 45min on encounter including review of prior records, hospitalization, and pt visit.    Electronically signed by: Seun Salazar PA-C        "

## 2023-10-07 DIAGNOSIS — R52 PAIN: Primary | ICD-10-CM

## 2023-10-09 RX ORDER — TRAMADOL HYDROCHLORIDE 50 MG/1
TABLET ORAL
Qty: 30 TABLET | Refills: 0 | Status: SHIPPED | OUTPATIENT
Start: 2023-10-09 | End: 2023-11-16

## 2023-10-11 ENCOUNTER — TELEPHONE (OUTPATIENT)
Dept: GERIATRICS | Facility: CLINIC | Age: 83
End: 2023-10-11
Payer: COMMERCIAL

## 2023-10-11 NOTE — TELEPHONE ENCOUNTER
Ortho Nursing home visit    Elizabeth Taylor is a 83 year old female who resides at [facility],     Patient was seen 5 weeks ago, on-site after a number of falls, C/O left wrist pain, x-rays were taken on-site and were positive for a distal radius fracture, date undetermined, patient was placed in a wrist splint, for comfort, instructed to use for 4-5 weeks, and repeat imaging at that time.      Past Medical History:   Diagnosis Date    Anal cancer (H)     Endometriosis, site unspecified     History of malignant neoplasm of anus 7/26/2021    Periprosthetic fracture of right hip, subsequent encounter 7/26/2021    Thyroiditis, unspecified     Thryoiditis-resolved      Past Surgical History:   Procedure Laterality Date    APPENDECTOMY      as a child    ARTHROPLASTY HIP Right 7/26/2018    Procedure: ARTHROPLASTY HIP;  Right Hip Hemiarthroplasty;  Surgeon: Zaire Phan MD;  Location: [facility] OR    COLONOSCOPY N/A 4/13/2017    Procedure: COLONOSCOPY;  Surgeon: Juan Dos Santos MD;  Location: [facility] GI    INSERT PORT VASCULAR ACCESS Right 2/8/2018    Procedure: INSERT PORT VASCULAR ACCESS;  Place Single Lumen Venous Chest Port Placement;  Surgeon: Zaire Moreira PA-C;  Location: [facility] OR    SURGICAL HISTORY OF -       endometriosis        Allergies   Allergen Reactions    Penicillins Hives     As a child    Ketoconazole Rash    Propoxyphene Unknown     Had reaction in teen years      LMP  (LMP Unknown)        X-rays show : Moderate bone healing, shown on x-rays dated , 10/10/2023 compared to prior images.    ASSESSMENT / PLAN: patient should return to activity as tolerated ; WBAT abd use splint PRN for any pain or stiffness as she resumes her activity.  NO FUTURE IMAGING NEEDED UNLESS CHANGE IN CONDITION;          [facility]  [phone] Cell

## 2023-10-13 ENCOUNTER — PATIENT OUTREACH (OUTPATIENT)
Dept: GERIATRIC MEDICINE | Facility: CLINIC | Age: 83
End: 2023-10-13
Payer: COMMERCIAL

## 2023-10-13 NOTE — PROGRESS NOTES
10-13-23   CC had been out to see member for her annual visit and at that time received a POLST from the facility and set to ACP department to have it scanned.  They sent it back stating it was not completed appropriately so CC asked NP that is seeing the member to complete a new one at her next visit if possible  Natividad Thornton MA [facility] Coordinator   [phone] - work cell phone   [phone] - sdvg fax

## 2023-10-13 NOTE — PROGRESS NOTES
TRANSITIONS OF CARE (SUZIE) LOG    SUZIE tasks should be completed by the CC within one (1) business day of notification of each transition. Follow up contact with member is required after return to their usual care setting.  Note:  If CC finds out about the transitions fifteen (15) days or more after the member has returned to their usual care setting, no SUZIE log is needed. However, the CC should check in with the member to discuss the transition process, any changes needed to the care plan and document it in a case note.     Member Name:  Elizabeth Taylor [facility] Name:  [employer] Member ID#: [id number]    Product: [place] Care Coordinator Contact:  Natividad Thornton MA, [facility]   Transition Communication Actions from Care Management Contact   Transition #1   Notification Date: 10-13-23 Transition Date:   9-23-23 Transition From: Nursing Home, [facility]     Is this the member s usual care setting?               yes Transition To: Hospital, [facility]    Transition Type:  Unplanned  closed displaced fracture of proximal end of left humerus   Documentation from conversation with the member/responsible party, provider, discharging and receiving facility:   Date: 10-13-23: Received notification of transition after member was already back at the facility.  Initial thought was just and ED visit and it was an inpatient visit that CC was not aware of until after member was back at facility and doing chart review.      CC contacted   at the SNF   and left a message requesting a return call.  Member has a follow-up appointment with PCP in 7 days: Yes: scheduled on 9-29-23    Member has had a change in condition: No  Home visit needed: No  Care plan reviewed and updated.  The following home based services None were resumed.  New referrals placed: Yes: orthopedic in house support   Transition log completed.   PCP, Seun Salazar, notified of transition  back to home via EMR.    Note from  as of an update today    Zoey Henson,  Yes, Elizabeth returned with orders to wear a brace d/t a fracture, but was met with resistance. The sling was continuoulsy offered though. The fracture is healing well and she no longer has to wear the brace all the time, but rather as needed. Elizabeth herself seems to be in good spirits when I have visited her or seen her in passing. Last note I saw was that she was assist of one with transfers to her w/c. We are continuing to monitor and provide treatment for pain management as needed.  If you have any additional questions, please let me know.  Natividad Thornton MA [facility] Care Coordinator   [phone] - work cell phone   [phone] - vbhd fax         Transition #2   Notification Date: 10-13-23 Transition Date:   9-27-23 Transition From: Hospital, [facility]      Is this the member s usual care setting?               no Transition To: Nursing Home, [facility]   Transition Type:  Planned    Documentation from conversation with the member/responsible party, provider, discharging and receiving facility:   Date: 10-13-23: CC was notified after member has been discharge back to the SNF. CC followed up via email with the SW at the SNF to see if there was any further concerns or questions about member with this transition.   Natividad Thornton MA [facility] Care Coordinator   [phone] - work cell phone   [phone] - work fax           *RETURN TO USUAL CARE SETTING: *Complete tasks below when the member is discharging TO their usual care setting within one (1) business day of notification..      For situations where the Care Coordinator is notified of the discharge prior to the date of discharge, the Care Coordinator must follow up with the member or designated representative to confirm that discharge actually occurred and discuss required SUZIE tasks as outlined in the SUZIE Instructions.  (This includes  situations where it may be a  new  usual care setting for the member. (i.e., a community member who decides upon permanent nursing home placement following hospitalization and rehab).    Discuss with Member/Responsible Party:    Check  Yes  - if the member, family member and/or SNF/facility staff manages the following:    If  No  provide explanation in the comments section.          Date completed: 10-13-23 Communicated with member or their designated representative about the following:  care transition process; about changes to the member s health status; plan of care updates; education about transitions and how to prevent unplanned transitions/readmissions    Four Pillars for Optimal Transition:    Check  Yes  - if the member, family member and/or SNF/facility staff manages the following:    If  No  provide explanation in the comments section.          [x]  Yes     []  No Does the member have a follow-up appointment scheduled with primary care or specialist? (Mental health hospitalizations--the appt. should be w/in 7 days)              For mental health hospitalizations:  []  Yes     [x]  No     Does the member have a follow-up appointment scheduled with a mental health practitioner within 7 days of discharge?  [x]  Yes     []  No     Has a medication review been completed with member? If no, refer to PCP, home care nurse, MTM, pharmacist  [x]  Yes     []  No     Can the member manage their medications or is there a system in place to manage medications (e.g. home care set-up)?         [x]  Yes     []  No     Can the member verbalize warning signs and symptoms to watch for and how to respond?  [x]  Yes     []  No     Does the member have a copy of and understand their discharge instructions?  If no, assist to obtain copy of discharge instructions, review discharge instructions, and assist to contact PCP to discuss questions about their recent hospitalization.  [x]  Yes     []  No     Does the member have adequate  food, housing and transportation?  If no, add goal and discuss additional supports available to the member                                                                                                                                                                                 [x]  Yes     []  No     Is the member safe in their home?  If no, document needs and support provided                                                                                                                                                                          []  Yes     [x]  No     Are there any concerns of vulnerability, abuse, or neglect?  If yes, document concerns and actions taken by Care Coordinator as a mandated                                                                                                                                                                              [x]  Yes     []  No     Does the member use a Personal Health Care Record?  Check  Yes  if visit summary, discharge summary, and/or healthcare summary are being used as a PHR.                                                                                                                                                                                  [x]  Yes     []  No     Have you reviewed the discharge summary with the member? If  No  provide explanation in comments.  [x]  Yes     []  No     Have you updated the member s care plan/support plan? Add new diagnosis, medications, treatments, goals & interventions, as applicable. If No, provide explanation in comments.    Comments:           Notes from conversation with the member/responsible party, provider, discharging and receiving facility (as applicable): NA

## 2023-10-23 ENCOUNTER — TELEPHONE (OUTPATIENT)
Dept: GERIATRICS | Facility: CLINIC | Age: 83
End: 2023-10-23
Payer: COMMERCIAL

## 2023-10-23 RX ORDER — GABAPENTIN 100 MG/1
100 CAPSULE ORAL 3 TIMES DAILY
COMMUNITY

## 2023-10-23 NOTE — TELEPHONE ENCOUNTER
[facility] Geriatrics Triage Nurse Telephone Encounter    Provider: Rex Damon NP   Facility: [facility]  Facility Type:  LTC    Caller: Remigio    Allergies:    Allergies   Allergen Reactions    Penicillins Hives     As a child    Ketoconazole Rash    Propoxyphene Unknown     Had reaction in teen years        Reason for call: Nurse noted rash on lower back, small localized area. Not bothersome or itching for patient. First noticed last night after the bath during skin audit. Has not spread today. Had previous orders for Aquaphor that were discontinued per nurse.     Verbal Order/Direction given by Provider:   - Aquaphor Apply to lower back BID for 7 days then BID PRN for rash/dry skin; update PCP if not resolving or worsening    Provider giving Order:  Rex Damon NP     Verbal Order given to: Remigio Padgett RN

## 2023-10-23 NOTE — TELEPHONE ENCOUNTER
[facility] Geriatrics Triage Nurse Telephone Encounter    Provider: Rex Damon NP   [facility]  Facility Type:  [facility]    Caller: Leida   Call Back Number: [phone]    Allergies:    Allergies   Allergen Reactions    Penicillins Hives     As a child    Ketoconazole Rash    Propoxyphene Unknown     Had reaction in teen years        Reason for call:   Today the nurse manager wanted to update about 2 things.  First thing is that the patient's weight is down to 106.5, back in January 2023 patient had weighed 120 pounds.  The nurse manager does note that the patient is more physical as she was using physical therapy, patient has been upbeat laughing in no concerns at this time.  Patient was just started on protein supplement 4 ounces between meals per the dietitian.     Patient also was evaluated by the in-house pharmacist   Patient is currently on gabapentin 200 mg 3 times a day and Robaxin 3 times a day as needed, patient does have a history of falls and had 2 suggestions.  The patient is using the Robaxin a few times per week for muscle pain.   #1 decrease gabapentin to 200 mg twice daily  #2 change Robaxin to Zanaflex.     Verbal Order/Direction given by Provider: Decrease gabapentin to 100mg tid.     Provider giving Order:  Rex Damon NP     Verbal Order given to: Remigio Owens RN

## 2023-10-30 ENCOUNTER — TELEPHONE (OUTPATIENT)
Dept: GERIATRICS | Facility: CLINIC | Age: 83
End: 2023-10-30
Payer: COMMERCIAL

## 2023-10-30 NOTE — TELEPHONE ENCOUNTER
[facility] Geriatrics Triage Nurse Telephone Encounter    Provider: Rex Damon NP   Facility: [facility]  Facility Type:  LTC    Caller: Eunice    Allergies:    Allergies   Allergen Reactions    Penicillins Hives     As a child    Ketoconazole Rash    Propoxyphene Unknown     Had reaction in teen years        Reason for call: The nurses calling to update on the patient's lower back rash.  On 10-23 the patient was ordered Aquaphor twice daily and then changed to twice daily as needed.  The patient's rash is improving at this time.     Verbal Order/Direction given by Provider: Continue with Aquaphor twice daily times another 7 days then changed to twice daily as needed.  Call if any worsening or not resolving    Provider giving Order:  Rex Damon NP     Verbal Order given to: Eunice Owens RN

## 2023-11-07 ENCOUNTER — MYC MEDICAL ADVICE (OUTPATIENT)
Dept: SURGERY | Facility: CLINIC | Age: 83
End: 2023-11-07
Payer: COMMERCIAL

## 2023-11-14 ENCOUNTER — MYC MEDICAL ADVICE (OUTPATIENT)
Dept: SURGERY | Facility: CLINIC | Age: 83
End: 2023-11-14
Payer: COMMERCIAL

## 2023-11-14 ENCOUNTER — TELEPHONE (OUTPATIENT)
Dept: SURGERY | Facility: CLINIC | Age: 83
End: 2023-11-14
Payer: COMMERCIAL

## 2023-11-15 VITALS
DIASTOLIC BLOOD PRESSURE: 71 MMHG | HEART RATE: 72 BPM | RESPIRATION RATE: 18 BRPM | OXYGEN SATURATION: 95 % | SYSTOLIC BLOOD PRESSURE: 118 MMHG | BODY MASS INDEX: 20.08 KG/M2 | TEMPERATURE: 97.2 F | WEIGHT: 117 LBS

## 2023-11-16 ENCOUNTER — NURSING HOME VISIT (OUTPATIENT)
Dept: GERIATRICS | Facility: CLINIC | Age: 83
End: 2023-11-16
Payer: COMMERCIAL

## 2023-11-16 ENCOUNTER — LAB REQUISITION (OUTPATIENT)
Dept: LAB | Facility: CLINIC | Age: 83
End: 2023-11-16
Payer: COMMERCIAL

## 2023-11-16 DIAGNOSIS — R94.6 ABNORMAL RESULTS OF THYROID FUNCTION STUDIES: ICD-10-CM

## 2023-11-16 DIAGNOSIS — R52 PAIN: ICD-10-CM

## 2023-11-16 DIAGNOSIS — S42.295S OTHER CLOSED NONDISPLACED FRACTURE OF PROXIMAL END OF LEFT HUMERUS, SEQUELA: ICD-10-CM

## 2023-11-16 DIAGNOSIS — M80.00XS OSTEOPOROSIS WITH PATHOLOGICAL FRACTURE, SEQUELA: ICD-10-CM

## 2023-11-16 DIAGNOSIS — K59.01 SLOW TRANSIT CONSTIPATION: ICD-10-CM

## 2023-11-16 DIAGNOSIS — E03.9 HYPOTHYROIDISM, UNSPECIFIED TYPE: Primary | ICD-10-CM

## 2023-11-16 PROCEDURE — 99309 SBSQ NF CARE MODERATE MDM 30: CPT | Performed by: NURSE PRACTITIONER

## 2023-11-16 NOTE — LETTER
11/16/2023        RE: Elizabeth Taylor  [facility]  [address] [address]        [facility] GERIATRICS    PRIMARY CARE PROVIDER AND CLINIC:  Rex Damon NP, [address] [facility] [address]  Chief Complaint   Patient presents with     RECHECK      [facility] Medical Record Number:  [id number]  Place of Service where encounter took place:  [facility] ([facility] [[id number]]    Elizabeth Taylor  is a 83 year old  (1940)    HPI:    This is a 84 yo female with PMHx for MCI, osteoporosis, anal Ca, frequent falls who sustained a left radial fx, given a removable splint, WBAT and advised to where a sling for comfort which she doesn't use.  Currently using therapy 3x/wk per report.     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  FAMILY HISTORY: family history includes Cancer in her maternal grandmother and sister; Heart Disease in her father; Uterine Cancer in her niece.  SOCIAL HISTORY:   reports that she has never smoked. She has never used smokeless tobacco. She reports that she does not drink alcohol and does not use drugs.  Patient's living condition: lives in a nursing home    Current Outpatient Medications   Medication Sig     acetaminophen (TYLENOL)  500 MG tablet Take 1,000 mg by mouth 3 times daily     gabapentin (NEURONTIN) 100 MG capsule Take 100 mg by mouth 3 times daily     levothyroxine (SYNTHROID/LEVOTHROID) 50 MCG tablet Take 50 mcg by mouth daily     mineral oil-hydrophilic petrolatum (AQUAPHOR) external ointment Apply topically 2 times daily (Apply to legs and back of neck)     polyethylene glycol (MIRALAX) 17 GM/Dose powder Take 17 g by mouth daily     Vitamin E 670 MG (1000 UT) CAPS Take 1 capsule by mouth daily     No current facility-administered medications for this visit.       ROS:  4 point ROS including Respiratory, CV, GI and , other than that noted in the HPI,  is negative    Vitals:  /71   Pulse 72   Temp 97.2  F (36.2  C)   Resp 18   Wt 53.1 kg (117 lb)   LMP  (LMP Unknown)   SpO2 95%   BMI 20.08 kg/m    Exam:  GENERAL APPEARANCE:  Alert, in no distress, cooperative, pain controlled  RESP:  lungs clear to auscultation , no respiratory distress  CV:  regular rate and rhythm, no murmur, rub, or gallop, no edema  PSYCH:  oriented to 2, memory impaired     Lab/Diagnostic data:  Most Recent 3 CBC's:  Recent Labs   Lab Test 07/19/23  1102 07/21/21  0611 07/20/21  1608   WBC 6.7 4.9 5.1   HGB 13.4 11.5* 10.7*   * 103* 100    178 150     Most Recent 3 BMP's:  Recent Labs   Lab Test 07/19/23  1102 10/13/21  0536 07/21/21  0611    139 142   POTASSIUM 4.5 4.5 3.6   CHLORIDE 101 102 111*   CO2 25 27 27   BUN 18.9 23 20   CR 0.60 0.76 0.58   ANIONGAP 12 10 4   ELISE 10.0 9.4 8.4*   GLC 69* 80 87     Most Recent TSH and T4:  Recent Labs   Lab Test 08/23/23  0739 08/16/23  1113   TSH  --  17.70*   T4 0.92  --        ASSESSMENT/PLAN:    (S42.295S) Other closed nondisplaced fracture of proximal end of left humerus, sequela  (M80.00XS) Osteoporosis with pathological fracture, sequela  WBAT, using therapy 3x/wk per report    (R52) Pain  Continue tylenol 1000mg TID and gabapentin 100mg TID, today discontinue methocarbamol and  tramadol. Pain controlled    BP  Not on medications, -130s, HR <80s    (E03.9) Hypothyroidism  Continue synthroid 50mcg daily. Last TSH 17.70, recheck TSH/FT4    Renal fx  Last Cr 0.60 with GFR >60 on 7/19    Anemia monitoring  Last Hgb 13.4 on 7/19    (K59.01) Slow transit constipation  Continue miralax daily    Orders:  Discontinue methocarbamol and tramadol, TSH/FT4    Electronically signed by:  Rex Damon NP                    Sincerely,        Rex Damon NP

## 2023-11-16 NOTE — PROGRESS NOTES
[facility] GERIATRICS    PRIMARY CARE PROVIDER AND CLINIC:  Rex Damon NP, [address] [facility] [address]  Chief Complaint   Patient presents with    CHARLENE      [facility] Medical Record Number:  [id number]  Place of Service where encounter took place:  [facility] ([facility] [[id number]]    Elizabeth Taylor  is a 83 year old  (1940)    HPI:    This is a 84 yo female with PMHx for MCI, osteoporosis, anal Ca, frequent falls who sustained a left radial fx, given a removable splint, WBAT and advised to where a sling for comfort which she doesn't use.  Currently using therapy 3x/wk per report.     CODE STATUS/ADVANCE DIRECTIVES DISCUSSION:  Full Code  CPR/Full code   ALLERGIES:   Allergies   Allergen Reactions    Penicillins Hives     As a child    Ketoconazole Rash    Propoxyphene Unknown     Had reaction in teen years      PAST MEDICAL HISTORY:   Past Medical History:   Diagnosis Date    Anal cancer (H)     Endometriosis, site unspecified     History of malignant neoplasm of anus 7/26/2021    Periprosthetic fracture of right hip, subsequent encounter 7/26/2021    Thyroiditis, unspecified     Thryoiditis-resolved      PAST SURGICAL HISTORY:   has a past surgical history that includes surgical history of - ; Colonoscopy (N/A, 4/13/2017); appendectomy; Insert port vascular access (Right, 2/8/2018); and Arthroplasty hip (Right, 7/26/2018).  FAMILY HISTORY: family history includes Cancer in her maternal grandmother and sister; Heart Disease in her father; Uterine Cancer in her niece.  SOCIAL HISTORY:   reports that she has never smoked. She has never used smokeless tobacco. She reports that she does not drink alcohol and does not use drugs.  Patient's living condition: lives in a nursing home    Current Outpatient Medications   Medication Sig    acetaminophen (TYLENOL) 500 MG tablet Take 1,000 mg by mouth 3 times daily    gabapentin (NEURONTIN) 100 MG capsule Take 100 mg by mouth 3 times  daily    levothyroxine (SYNTHROID/LEVOTHROID) 50 MCG tablet Take 50 mcg by mouth daily    mineral oil-hydrophilic petrolatum (AQUAPHOR) external ointment Apply topically 2 times daily (Apply to legs and back of neck)    polyethylene glycol (MIRALAX) 17 GM/Dose powder Take 17 g by mouth daily    Vitamin E 670 MG (1000 UT) CAPS Take 1 capsule by mouth daily     No current facility-administered medications for this visit.       ROS:  4 point ROS including Respiratory, CV, GI and , other than that noted in the HPI,  is negative    Vitals:  /71   Pulse 72   Temp 97.2  F (36.2  C)   Resp 18   Wt 53.1 kg (117 lb)   LMP  (LMP Unknown)   SpO2 95%   BMI 20.08 kg/m    Exam:  GENERAL APPEARANCE:  Alert, in no distress, cooperative, pain controlled  RESP:  lungs clear to auscultation , no respiratory distress  CV:  regular rate and rhythm, no murmur, rub, or gallop, no edema  PSYCH:  oriented to 2, memory impaired     Lab/Diagnostic data:  Most Recent 3 CBC's:  Recent Labs   Lab Test 07/19/23  1102 07/21/21  0611 07/20/21  1608   WBC 6.7 4.9 5.1   HGB 13.4 11.5* 10.7*   * 103* 100    178 150     Most Recent 3 BMP's:  Recent Labs   Lab Test 07/19/23  1102 10/13/21  0536 07/21/21  0611    139 142   POTASSIUM 4.5 4.5 3.6   CHLORIDE 101 102 111*   CO2 25 27 27   BUN 18.9 23 20   CR 0.60 0.76 0.58   ANIONGAP 12 10 4   ELISE 10.0 9.4 8.4*   GLC 69* 80 87     Most Recent TSH and T4:  Recent Labs   Lab Test 08/23/23  0739 08/16/23  1113   TSH  --  17.70*   T4 0.92  --        ASSESSMENT/PLAN:    (S42.295S) Other closed nondisplaced fracture of proximal end of left humerus, sequela  (M80.00XS) Osteoporosis with pathological fracture, sequela  WBAT, using therapy 3x/wk per report    (R52) Pain  Continue tylenol 1000mg TID and gabapentin 100mg TID, today discontinue methocarbamol and tramadol. Pain controlled    BP  Not on medications, -130s, HR <80s    (E03.9) Hypothyroidism  Continue synthroid 50mcg  daily. Last TSH 17.70, recheck TSH/FT4    Renal fx  Last Cr 0.60 with GFR >60 on 7/19    Anemia monitoring  Last Hgb 13.4 on 7/19    (K59.01) Slow transit constipation  Continue miralax daily    Orders:  Discontinue methocarbamol and tramadol, TSH/FT4    Electronically signed by:  Rex Damon NP

## 2023-11-17 LAB
T4 FREE SERPL-MCNC: 1.13 NG/DL (ref 0.9–1.7)
TSH SERPL DL<=0.005 MIU/L-ACNC: 5.68 UIU/ML (ref 0.3–4.2)

## 2023-11-17 PROCEDURE — 36415 COLL VENOUS BLD VENIPUNCTURE: CPT | Mod: ORL | Performed by: FAMILY MEDICINE

## 2023-11-17 PROCEDURE — 84443 ASSAY THYROID STIM HORMONE: CPT | Mod: ORL | Performed by: FAMILY MEDICINE

## 2023-11-17 PROCEDURE — 84439 ASSAY OF FREE THYROXINE: CPT | Mod: ORL | Performed by: FAMILY MEDICINE

## 2023-11-17 PROCEDURE — P9604 ONE-WAY ALLOW PRORATED TRIP: HCPCS | Mod: ORL | Performed by: FAMILY MEDICINE

## 2023-12-07 ENCOUNTER — ANCILLARY PROCEDURE (OUTPATIENT)
Dept: BONE DENSITY | Facility: CLINIC | Age: 83
End: 2023-12-07
Attending: PHYSICIAN ASSISTANT
Payer: COMMERCIAL

## 2023-12-07 DIAGNOSIS — Z78.0 ASYMPTOMATIC MENOPAUSAL STATE: ICD-10-CM

## 2023-12-07 DIAGNOSIS — M41.34 THORACOGENIC SCOLIOSIS OF THORACIC REGION: ICD-10-CM

## 2023-12-07 PROCEDURE — 77080 DXA BONE DENSITY AXIAL: CPT | Performed by: INTERNAL MEDICINE

## 2023-12-14 ENCOUNTER — LAB REQUISITION (OUTPATIENT)
Dept: LAB | Facility: CLINIC | Age: 83
End: 2023-12-14
Payer: COMMERCIAL

## 2023-12-14 ENCOUNTER — ASSISTED LIVING VISIT (OUTPATIENT)
Dept: GERIATRICS | Facility: CLINIC | Age: 83
End: 2023-12-14
Payer: COMMERCIAL

## 2023-12-14 VITALS
HEIGHT: 64 IN | RESPIRATION RATE: 18 BRPM | DIASTOLIC BLOOD PRESSURE: 78 MMHG | BODY MASS INDEX: 17.86 KG/M2 | OXYGEN SATURATION: 96 % | TEMPERATURE: 97.2 F | WEIGHT: 104.6 LBS | HEART RATE: 75 BPM | SYSTOLIC BLOOD PRESSURE: 126 MMHG

## 2023-12-14 DIAGNOSIS — Z13.228 ENCOUNTER FOR SCREENING FOR OTHER METABOLIC DISORDERS: ICD-10-CM

## 2023-12-14 DIAGNOSIS — M41.9 KYPHOSCOLIOSIS: ICD-10-CM

## 2023-12-14 DIAGNOSIS — S62.102D CLOSED FRACTURE OF LEFT WRIST WITH ROUTINE HEALING, SUBSEQUENT ENCOUNTER: Primary | ICD-10-CM

## 2023-12-14 DIAGNOSIS — G31.84 MCI (MILD COGNITIVE IMPAIRMENT): ICD-10-CM

## 2023-12-14 DIAGNOSIS — E79.0 HYPERURICEMIA WITHOUT SIGNS OF INFLAMMATORY ARTHRITIS AND TOPHACEOUS DISEASE: ICD-10-CM

## 2023-12-14 DIAGNOSIS — E46 PROTEIN-CALORIE MALNUTRITION, UNSPECIFIED SEVERITY (H): ICD-10-CM

## 2023-12-14 DIAGNOSIS — M80.00XD AGE-RELATED OSTEOPOROSIS WITH CURRENT PATHOLOGICAL FRACTURE WITH ROUTINE HEALING, SUBSEQUENT ENCOUNTER: ICD-10-CM

## 2023-12-14 DIAGNOSIS — S42.295D OTHER CLOSED NONDISPLACED FRACTURE OF PROXIMAL END OF LEFT HUMERUS WITH ROUTINE HEALING, SUBSEQUENT ENCOUNTER: ICD-10-CM

## 2023-12-14 DIAGNOSIS — E58 DIETARY CALCIUM DEFICIENCY: ICD-10-CM

## 2023-12-14 PROCEDURE — 99349 HOME/RES VST EST MOD MDM 40: CPT | Performed by: FAMILY MEDICINE

## 2023-12-14 NOTE — LETTER
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     Follow-up today primarily related to osteoporosis.  He has had multiple falls in the last 6 months.  Unfortunately this has resulted in left humeral and radius fractures consistent with osteoporosis.  She did have follow-up with orthopedic surgery 11/9/2023 able to advance weight bearing activities as tolerated.  She continues to wear a left wrist brace to assist with mobility and pain.    Additionally she had a DEXA scan performed recently with results of T-score of negative 4.0.  Given this, particularly in the context of recent fractures, consistent with severe osteoporosis.    Reviewed these findings with her today.  Overall she is feeling well.  She is not having pain and feels she is recovering from her recent falls and fractures and feeling much better.  She has been trying to work her hands more frequently.  She does find the brace useful.  Continues to get up and walk regularly, does note she feels her back is weak.    Activity and mobility are significantly important to her and is she would like to maintain these as much as possible.    In reviewing her DEXA findings today, discussed that these findings, particular in the presence of recent fractures, would indicate that she has a significant risk of developing a fracture.  Most notably, could suffer a hip fracture which would likely result in severely impaired function mobility.    Reviewed, given her level of osteoporosis, would recommend teriparatide.  Sounds as though she has discussed this in the  "past and previously did not wish to start this.  She has concerns about injecting foreign material that may not be needed.  Again reviewed the recommendation for this and would be in line with her values of maintaining mobility.  Did review other options including romosozumab, though would be concerned about coverage.  Not as favorable, but reasonable alternative if she declines other anabolic agents, would be denosumab.    With further discussion of risks and benefits of multiple of these therapies, as well as risks of not using these, she does not wish to initiate any of these today.  I did recommend still evaluating labs in case we start these after further discussion.      Objective:   /78   Pulse 75   Temp 97.2  F (36.2  C)   Resp 18   Ht 1.626 m (5' 4\")   Wt 47.4 kg (104 lb 9.6 oz)   LMP  (LMP Unknown)   SpO2 96%   BMI 17.95 kg/m      GENERAL APPEARANCE: Seated in bed comfortably, NAD  HENT:  NCAT, moderately [place], poor dentition  EYES:  Conjunctiva clear, anicteric, EOMI, PERRL  PULM  Normal WOB on RA, lungs CTAB, no wheezes or crackles, air movement limited by kyphosis  CV:  RRR, S1/S2 normal, no murmurs  ABDOMEN: Abdomen soft, not tender, not distended, BS normal and active throughout   M/S:   Wearing brace on left wrist, severe kyphoscoliosis  NEURO: Alert, answering questions appropriately, having difficulty incorporating recommendations with her overall goal (saying \"I'll keep working my hand\" or \"If I just keep moving, I'll be ok\"); CN II-XII grossly intact, PSYCH:  Mood mildly anxious with bright affect    Recent labs in [facility] reviewed by me today.     Assessment/Plan:      ICD-10-CM    1. Closed fracture of left wrist with routine healing, subsequent encounter  S62.102D       2. Other closed nondisplaced fracture of proximal end of left humerus with routine healing, subsequent encounter  S42.295D       3. Severe Age-related osteoporosis, T-score -4.0, with current pathological fracture " with routine healing, subsequent encounter  M80.00XD       4. Kyphoscoliosis  M41.9       5. Protein-calorie malnutrition, unspecified severity (H24)  E46       6. MCI (mild cognitive impairment)  G31.84          Extended discussion today regarding significant osteoporosis, risks of fracture and functional impairments, and recommendation for medical therapy particular anabolic agent such as teriparatide.  Would wish to avoid bisphosphonates given her poor dentition.  Denosumab may be less concerning, but given severity, again likely to benefit from an anabolic agent. With thorough review, the various explanations and examples, she still did not wish to start these.  She does seem to have difficulty incorporating the provided information with her stated values, stating that she would keep working her fingers, keep moving her wrist, keep walking, so she does not get weak.  However these did not relate to her discussions of her bone health.  Unclear, but concerned that she is unable to understand these recommendations rather than being completely opposed to them.  She seems to have mild cognitive impairment, but possibly has progressed to dementia.  Consider cognitive evaluation as this may play into her decision-making capacity.    Did recommend we would discuss again at another time, possibly next couple weeks.  In case of starting medications, as well as to review effect of malnutrition on her bone health, will obtain CMP, ionized calcium, uric acid.      MED REC REQUIRED  Post Medication Reconciliation Status: patient was not discharged from an inpatient facility or TCU      Orders:  [x] CMP, ionized CA, Uric Acid    Electronically signed by:     Benjamin Rosenstein, MD, MA  [facility]     This note was completed with the assistance of dictation software. Typos and word substitution-errors are expected and unintended.      43 MINUTES SPENT BY ME on the date of service doing chart review,  history, exam, documentation & further activities per the note.        Sincerely,        Benjamin Rosenstein, MD

## 2023-12-14 NOTE — PROGRESS NOTES
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     Follow-up today primarily related to osteoporosis.  He has had multiple falls in the last 6 months.  Unfortunately this has resulted in left humeral and radius fractures consistent with osteoporosis.  She did have follow-up with orthopedic surgery 11/9/2023 able to advance weight bearing activities as tolerated.  She continues to wear a left wrist brace to assist with mobility and pain.    Additionally she had a DEXA scan performed recently with results of T-score of negative 4.0.  Given this, particularly in the context of recent fractures, consistent with severe osteoporosis.    Reviewed these findings with her today.  Overall she is feeling well.  She is not having pain and feels she is recovering from her recent falls and fractures and feeling much better.  She has been trying to work her hands more frequently.  She does find the brace useful.  Continues to get up and walk regularly, does note she feels her back is weak.    Activity and mobility are significantly important to her and is she would like to maintain these as much as possible.    In reviewing her DEXA findings today, discussed that these findings, particular in the presence of recent fractures, would indicate that she has a significant risk of developing a fracture.  Most notably, could suffer a hip fracture which would likely result in severely impaired function mobility.    Reviewed, given her level of osteoporosis, would recommend teriparatide.  Sounds as though she has discussed this in the past and previously did not wish to start this.  She has concerns about injecting foreign material that may not be  "needed.  Again reviewed the recommendation for this and would be in line with her values of maintaining mobility.  Did review other options including romosozumab, though would be concerned about coverage.  Not as favorable, but reasonable alternative if she declines other anabolic agents, would be denosumab.    With further discussion of risks and benefits of multiple of these therapies, as well as risks of not using these, she does not wish to initiate any of these today.  I did recommend still evaluating labs in case we start these after further discussion.      Objective:   /78   Pulse 75   Temp 97.2  F (36.2  C)   Resp 18   Ht 1.626 m (5' 4\")   Wt 47.4 kg (104 lb 9.6 oz)   LMP  (LMP Unknown)   SpO2 96%   BMI 17.95 kg/m      GENERAL APPEARANCE: Seated in bed comfortably, NAD  HENT:  NCAT, moderately [place], poor dentition  EYES:  Conjunctiva clear, anicteric, EOMI, PERRL  PULM  Normal WOB on RA, lungs CTAB, no wheezes or crackles, air movement limited by kyphosis  CV:  RRR, S1/S2 normal, no murmurs  ABDOMEN: Abdomen soft, not tender, not distended, BS normal and active throughout   M/S:   Wearing brace on left wrist, severe kyphoscoliosis  NEURO: Alert, answering questions appropriately, having difficulty incorporating recommendations with her overall goal (saying \"I'll keep working my hand\" or \"If I just keep moving, I'll be ok\"); CN II-XII grossly intact, PSYCH:  Mood mildly anxious with bright affect    Recent labs in [facility] reviewed by me today.     Assessment/Plan:      ICD-10-CM    1. Closed fracture of left wrist with routine healing, subsequent encounter  S62.102D       2. Other closed nondisplaced fracture of proximal end of left humerus with routine healing, subsequent encounter  S42.295D       3. Severe Age-related osteoporosis, T-score -4.0, with current pathological fracture with routine healing, subsequent encounter  M80.00XD       4. Kyphoscoliosis  M41.9       5. Protein-calorie " malnutrition, unspecified severity (H24)  E46       6. MCI (mild cognitive impairment)  G31.84          Extended discussion today regarding significant osteoporosis, risks of fracture and functional impairments, and recommendation for medical therapy particular anabolic agent such as teriparatide.  Would wish to avoid bisphosphonates given her poor dentition.  Denosumab may be less concerning, but given severity, again likely to benefit from an anabolic agent. With thorough review, the various explanations and examples, she still did not wish to start these.  She does seem to have difficulty incorporating the provided information with her stated values, stating that she would keep working her fingers, keep moving her wrist, keep walking, so she does not get weak.  However these did not relate to her discussions of her bone health.  Unclear, but concerned that she is unable to understand these recommendations rather than being completely opposed to them.  She seems to have mild cognitive impairment, but possibly has progressed to dementia.  Consider cognitive evaluation as this may play into her decision-making capacity.    Did recommend we would discuss again at another time, possibly next couple weeks.  In case of starting medications, as well as to review effect of malnutrition on her bone health, will obtain CMP, ionized calcium, uric acid.      MED REC REQUIRED  Post Medication Reconciliation Status: patient was not discharged from an inpatient facility or TCU      Orders:  [x] CMP, ionized CA, Uric Acid    Electronically signed by:     Benjamin Rosenstein, MD, MA  [facility]     This note was completed with the assistance of dictation software. Typos and word substitution-errors are expected and unintended.      43 MINUTES SPENT BY ME on the date of service doing chart review, history, exam, documentation & further activities per the note.

## 2023-12-15 PROBLEM — E46 PROTEIN-CALORIE MALNUTRITION (H): Status: ACTIVE | Noted: 2023-12-15

## 2023-12-15 LAB
ALBUMIN SERPL BCG-MCNC: 3.9 G/DL (ref 3.5–5.2)
ALP SERPL-CCNC: 108 U/L (ref 40–150)
ALT SERPL W P-5'-P-CCNC: 11 U/L (ref 0–50)
ANION GAP SERPL CALCULATED.3IONS-SCNC: 9 MMOL/L (ref 7–15)
AST SERPL W P-5'-P-CCNC: 19 U/L (ref 0–45)
BILIRUB SERPL-MCNC: 0.7 MG/DL
BUN SERPL-MCNC: 19.2 MG/DL (ref 8–23)
CALCIUM SERPL-MCNC: 9.6 MG/DL (ref 8.8–10.2)
CHLORIDE SERPL-SCNC: 102 MMOL/L (ref 98–107)
CREAT SERPL-MCNC: 0.6 MG/DL (ref 0.51–0.95)
DEPRECATED HCO3 PLAS-SCNC: 29 MMOL/L (ref 22–29)
EGFRCR SERPLBLD CKD-EPI 2021: 88 ML/MIN/1.73M2
GLUCOSE SERPL-MCNC: 74 MG/DL (ref 70–99)
POTASSIUM SERPL-SCNC: 4.6 MMOL/L (ref 3.4–5.3)
PROT SERPL-MCNC: 6.1 G/DL (ref 6.4–8.3)
SODIUM SERPL-SCNC: 140 MMOL/L (ref 135–145)
URATE SERPL-MCNC: 3.1 MG/DL (ref 2.4–5.7)

## 2023-12-15 PROCEDURE — 80053 COMPREHEN METABOLIC PANEL: CPT | Mod: ORL | Performed by: FAMILY MEDICINE

## 2023-12-15 PROCEDURE — P9603 ONE-WAY ALLOW PRORATED MILES: HCPCS | Mod: ORL | Performed by: FAMILY MEDICINE

## 2023-12-15 PROCEDURE — 36415 COLL VENOUS BLD VENIPUNCTURE: CPT | Mod: ORL | Performed by: FAMILY MEDICINE

## 2023-12-15 PROCEDURE — P9604 ONE-WAY ALLOW PRORATED TRIP: HCPCS | Mod: ORL | Performed by: FAMILY MEDICINE

## 2023-12-15 PROCEDURE — 84550 ASSAY OF BLOOD/URIC ACID: CPT | Mod: ORL | Performed by: FAMILY MEDICINE

## 2023-12-16 ENCOUNTER — LAB REQUISITION (OUTPATIENT)
Dept: LAB | Facility: CLINIC | Age: 83
End: 2023-12-16
Payer: COMMERCIAL

## 2023-12-16 DIAGNOSIS — Z13.228 ENCOUNTER FOR SCREENING FOR OTHER METABOLIC DISORDERS: ICD-10-CM

## 2023-12-16 DIAGNOSIS — E58 DIETARY CALCIUM DEFICIENCY: ICD-10-CM

## 2023-12-16 DIAGNOSIS — E79.0 HYPERURICEMIA WITHOUT SIGNS OF INFLAMMATORY ARTHRITIS AND TOPHACEOUS DISEASE: ICD-10-CM

## 2023-12-18 ENCOUNTER — TELEPHONE (OUTPATIENT)
Dept: GERIATRICS | Facility: CLINIC | Age: 83
End: 2023-12-18
Payer: COMMERCIAL

## 2023-12-18 PROCEDURE — 36415 COLL VENOUS BLD VENIPUNCTURE: CPT | Mod: ORL | Performed by: FAMILY MEDICINE

## 2023-12-18 PROCEDURE — 84550 ASSAY OF BLOOD/URIC ACID: CPT | Mod: ORL | Performed by: FAMILY MEDICINE

## 2023-12-18 PROCEDURE — 80053 COMPREHEN METABOLIC PANEL: CPT | Mod: ORL | Performed by: FAMILY MEDICINE

## 2023-12-18 PROCEDURE — P9603 ONE-WAY ALLOW PRORATED MILES: HCPCS | Mod: ORL | Performed by: FAMILY MEDICINE

## 2023-12-18 NOTE — TELEPHONE ENCOUNTER
[place] GERIATRIC SERVICES NON-EMERGENT ENCOUNTER    Elizabeth Taylor is a 83 year old  (1940), Message received today regarding:   COVID-19 testing    Disposition  Pt tested positive for COVID-19 today. Currently asymptomatic. Temp 97.5 and O2 sat 96% on RA.    Requests  Update PCP: GRAHAM Oneil CNP      Electronically signed by:   Sana Padgett RN

## 2023-12-19 LAB
ALBUMIN SERPL BCG-MCNC: 4.2 G/DL (ref 3.5–5.2)
ALP SERPL-CCNC: 125 U/L (ref 40–150)
ALT SERPL W P-5'-P-CCNC: 14 U/L (ref 0–50)
ANION GAP SERPL CALCULATED.3IONS-SCNC: 10 MMOL/L (ref 7–15)
AST SERPL W P-5'-P-CCNC: 19 U/L (ref 0–45)
BILIRUB SERPL-MCNC: 0.7 MG/DL
BUN SERPL-MCNC: 18.8 MG/DL (ref 8–23)
CALCIUM SERPL-MCNC: 9.9 MG/DL (ref 8.8–10.2)
CHLORIDE SERPL-SCNC: 103 MMOL/L (ref 98–107)
CREAT SERPL-MCNC: 0.65 MG/DL (ref 0.51–0.95)
DEPRECATED HCO3 PLAS-SCNC: 27 MMOL/L (ref 22–29)
EGFRCR SERPLBLD CKD-EPI 2021: 86 ML/MIN/1.73M2
GLUCOSE SERPL-MCNC: 106 MG/DL (ref 70–99)
POTASSIUM SERPL-SCNC: 4.5 MMOL/L (ref 3.4–5.3)
PROT SERPL-MCNC: 6.7 G/DL (ref 6.4–8.3)
SODIUM SERPL-SCNC: 140 MMOL/L (ref 135–145)
URATE SERPL-MCNC: 3.1 MG/DL (ref 2.4–5.7)

## 2024-01-08 ENCOUNTER — ASSISTED LIVING VISIT (OUTPATIENT)
Dept: GERIATRICS | Facility: CLINIC | Age: 84
End: 2024-01-08
Payer: COMMERCIAL

## 2024-01-08 VITALS
BODY MASS INDEX: 18.27 KG/M2 | WEIGHT: 107 LBS | SYSTOLIC BLOOD PRESSURE: 132 MMHG | HEIGHT: 64 IN | TEMPERATURE: 98 F | RESPIRATION RATE: 18 BRPM | HEART RATE: 76 BPM | OXYGEN SATURATION: 97 % | DIASTOLIC BLOOD PRESSURE: 76 MMHG

## 2024-01-08 DIAGNOSIS — M62.838 MUSCLE SPASM: ICD-10-CM

## 2024-01-08 DIAGNOSIS — F39 MOOD DISORDER (H): ICD-10-CM

## 2024-01-08 DIAGNOSIS — F02.B4 MODERATE LATE ONSET ALZHEIMER'S DEMENTIA WITH ANXIETY (H): Primary | ICD-10-CM

## 2024-01-08 DIAGNOSIS — R52 PAIN: ICD-10-CM

## 2024-01-08 DIAGNOSIS — K59.01 SLOW TRANSIT CONSTIPATION: ICD-10-CM

## 2024-01-08 DIAGNOSIS — C21.1 MALIGNANT NEOPLASM OF ANAL CANAL (H): ICD-10-CM

## 2024-01-08 DIAGNOSIS — E03.9 HYPOTHYROIDISM, UNSPECIFIED TYPE: ICD-10-CM

## 2024-01-08 DIAGNOSIS — M80.00XD AGE-RELATED OSTEOPOROSIS WITH CURRENT PATHOLOGICAL FRACTURE WITH ROUTINE HEALING, SUBSEQUENT ENCOUNTER: ICD-10-CM

## 2024-01-08 DIAGNOSIS — G30.1 MODERATE LATE ONSET ALZHEIMER'S DEMENTIA WITH ANXIETY (H): Primary | ICD-10-CM

## 2024-01-08 DIAGNOSIS — M41.9 KYPHOSCOLIOSIS: ICD-10-CM

## 2024-01-08 DIAGNOSIS — E46 PROTEIN-CALORIE MALNUTRITION, UNSPECIFIED SEVERITY (H): ICD-10-CM

## 2024-01-08 PROCEDURE — 99350 HOME/RES VST EST HIGH MDM 60: CPT

## 2024-01-08 NOTE — PROGRESS NOTES
"[facility] GERIATRICS  Chief Complaint   Patient presents with    RECHECK     initial       HPI:    Elizabeth Taylor  is 83 year old (1940), who is being seen today for an episodic care visit at: [facility] [[id number]]. HPI information obtained from: facility chart records, facility staff, patient report and [facility] chart review. PMH: MCI, osteoporosis, anal cancer, hypothyroidism, and frequent falls. She has been a resident at this facility since 07/2021.    Patient is seen today for a visit to follow-up on multiple co morbidities. Patient states she does not feel pain but \"sore\" in her lower left abdomen; \"there's not a great deal of pain... it reduced to almost nothing\". Writer inquires more regarding the discomfort in her lower abdomen, she states it is not related to her stomach or digestion, reiterates feeling \"sore.. not pain\"; \"it is constant all day\" and \"comes and goes\"; points to her left groin. Reports recent increase in activities such as dancing; suspects it may be related. Endorses muscle spasms and walking helps relieve it which prevents her from participating in facility activities. She also cannot sleep well because \"I have to walk around all night\" due to the spasms. Appetite is good. Reports having diarrhea over the weekend; suspects it is from a \"strawberry pink pill\" she was given by staff; states refusing the pill the second time and diarrhea resolved; notes feeling grateful that the staff \"listened to me and stopped giving me it\". Denies CP, palpitations, lightheadedness, dizziness, fatigue, SOB, fever, chills, nausea/vomiting, or bladder concerns today.    Discussed with nursing staff today regarding care plan needs. Staff endorses cognitive impairment but no reports of behavioral disturbance. Patient will occasionally \"yell loudly\" from her room due to pain. Reports patient's mood can go \"up and down\". Her memory can be questionable as nurse manager is " unable to find a note about recently discontinuing a medication due to induced diarrhea.     ALLERGIES:Penicillins, Ketoconazole, and Propoxyphene  PAST MEDICAL HISTORY:   Past Medical History:   Diagnosis Date    Anal cancer (H)     Endometriosis, site unspecified     History of malignant neoplasm of anus 7/26/2021    Periprosthetic fracture of right hip, subsequent encounter 7/26/2021    Thyroiditis, unspecified     Thryoiditis-resolved     PAST SURGICAL HISTORY:   has a past surgical history that includes surgical history of - ; Colonoscopy (N/A, 4/13/2017); appendectomy; Insert port vascular access (Right, 2/8/2018); and Arthroplasty hip (Right, 7/26/2018).  FAMILY HISTORY: family history includes Cancer in her maternal grandmother and sister; Heart Disease in her father; Uterine Cancer in her niece.  SOCIAL HISTORY:  reports that she has never smoked. She has never used smokeless tobacco. She reports that she does not drink alcohol and does not use drugs.    MEDICATIONS:  MED REC REQUIRED  Post Medication Reconciliation Status:            Review of your medicines            Accurate as of January 8, 2024 11:59 PM. If you have any questions, ask your nurse or doctor.                CONTINUE these medicines which have NOT CHANGED        Dose / Directions   acetaminophen 500 MG tablet  Commonly known as: TYLENOL      Dose: 1,000 mg  Take 1,000 mg by mouth 3 times daily  Refills: 0     Calcium Carbonate-Vitamin D 500-5 MG-MCG Tabs      Dose: 1 tablet  Take 1 tablet by mouth 2 times daily  Refills: 0     gabapentin 100 MG capsule  Commonly known as: NEURONTIN      Dose: 100 mg  Take 100 mg by mouth 3 times daily  Refills: 0     levothyroxine 50 MCG tablet  Commonly known as: SYNTHROID/LEVOTHROID  Indication: Underactive Thyroid      Dose: 50 mcg  Take 50 mcg by mouth daily  Refills: 0     mineral oil-hydrophilic petrolatum external ointment      Apply topically 2 times daily (Apply to legs and back of  "neck)  Refills: 0     Vitamin E 670 MG (1000 UT) Caps      Dose: 1 capsule  Take 1 capsule by mouth daily  Refills: 0            STOP taking      polyethylene glycol 17 GM/Dose powder  Commonly known as: MIRALAX  Stopped by: GRAHAM Gutierrez CNP                  ROS:  10 point ROS of systems including Constitutional, Eyes, Respiratory, Cardiovascular, Gastroenterology, Genitourinary, Integumentary, Musculoskeletal, Psychiatric were all negative except for pertinent positives noted in my HPI.    Vitals:  /76   Pulse 76   Temp 98  F (36.7  C)   Resp 18   Ht 1.626 m (5' 4\")   Wt 48.5 kg (107 lb)   LMP  (LMP Unknown)   SpO2 97%   BMI 18.37 kg/m    Body mass index is 18.37 kg/m .  Exam:  GENERAL APPEARANCE:  Alert, elderly, in no distress, sitting bedside eating lunch  HEENT:  atraumatic, [place], EOM intact, partial dentition, moist mucus membranes  RESP:  non-labored breathing, lungs clear to auscultation, no respiratory distress, no cough  CV:  Rate regular, S1 S2 noted, mild BLE edema  ABDOMEN:  soft, non-distended, non-tender, bowel sounds normal  M/S:   uses WC and RW, moves all extremities, strength and tone equal bilaterally, no calf pain, arthritic changes noted in hands  SKIN:  warm, dry, thin, fragile, no obvious rash, lesions, ulcerations or petechiae  NEURO:   Face is symmetric, examination of sensation by touch normal, memory impaired, repetitive speech  PSYCH:  cooperative      Lab/Diagnostic data:   Labs reviewed as per Epic and/or Care Everywhere.      ASSESSMENT/PLAN     Moderate late onset Alzheimer's dementia with anxiety (H)  Mood disorder (H24)  Recall difficulty noted.   -continue supportive cares    Age-related osteoporosis with current pathological fracture with routine healing, subsequent encounter  Pain  Hx of left proximal humerus fx and left distal radius fx (diagnosed in 9/23). Per chart review, followed up with [facility] on 1/4/24; notes fractures have healed well, residual pain " and decreased motion may be secondary to shoulder osteoarthritis.   -continue to WBAT and activities as tolerated  -continue home exercise program as provided by Ortho  -follow-up with ortho as needed  -continue calcium carbonate vit D 500-5 mg-mcg BID  -continue APAP 1000 mg TID  -continue gabapentin 100 mg TID    Muscle spasm  Chronic. Hx of using robaxin but discontinued in 11/2023 due to increased falls. Patient endorses spasms today which limit her ability to participate in facility activities. Patient is nervous starting a new medication as it may cause her to have diarrhea. Receptive to taking a low dose of magnesium and titrate up.   -start magnesium glycinate 100 mg capsule qAM; increase to 200 mg if tolerating  -monitor effectiveness    Hypothyroidism, unspecified type  TSH 5.68 (11/17/23).  -continue levothyroxine 50 mcg daily  -trend labs periodically    Kyphoscoliosis  Chronic. Diagnosed 4/2023.  -continue assistive cares    Protein-calorie malnutrition, unspecified severity (H24)  Weight 107#. (108-109 last month). Poor dentition. Denies difficulty chewing or swallowing food.   -continue 4oz nutrition shake daily  -encourage healthy food intake    Malignant neoplasm of anal canal (H)  Noted per chart review. Diagnosed 7/2021.  -follow-up with oncology depending on goals of care    Slow transit constipation  Denies concern today.  -no active treatment regimen  -monitor BMs    45 minutes spent on the date of this encounter doing chart review, review labs, discussion with nursing staff, patient visit, and documentation.       Electronically signed by:  Dr. Jada Martinez, DNP, APRN, AGNP-BC

## 2024-01-08 NOTE — LETTER
"    1/8/2024        RE: Elizabeth Taylor  [facility]  [address] [address]        [facility] GERIATRICS  Chief Complaint   Patient presents with     RECHECK     initial       HPI:    Elizabeth Taylor  is 83 year old (1940), who is being seen today for an episodic care visit at: [facility] ([facility] [[id number]]. HPI information obtained from: facility chart records, facility staff, patient report and [facility] chart review. PMH: MCI, osteoporosis, anal cancer, hypothyroidism, and frequent falls. She has been a resident at this facility since 07/2021.    Patient is seen today for a visit to follow-up on multiple co morbidities. Patient states she does not feel pain but \"sore\" in her lower left abdomen; \"there's not a great deal of pain... it reduced to almost nothing\". Writer inquires more regarding the discomfort in her lower abdomen, she states it is not related to her stomach or digestion, reiterates feeling \"sore.. not pain\"; \"it is constant all day\" and \"comes and goes\"; points to her left groin. Reports recent increase in activities such as dancing; suspects it may be related. Endorses muscle spasms and walking helps relieve it which prevents her from participating in facility activities. She also cannot sleep well because \"I have to walk around all night\" due to the spasms. Appetite is good. Reports having diarrhea over the weekend; suspects it is from a \"strawberry pink pill\" she was given by staff; states refusing the pill the second time and diarrhea resolved; notes feeling grateful that the staff \"listened to me and stopped giving me it\". Denies CP, palpitations, lightheadedness, dizziness, fatigue, SOB, fever, chills, nausea/vomiting, or bladder concerns today.    Discussed with nursing staff today regarding care plan needs. Staff endorses cognitive impairment but no reports of behavioral disturbance. Patient will occasionally \"yell loudly\" from her room " "due to pain. Reports patient's mood can go \"up and down\". Her memory can be questionable as nurse manager is unable to find a note about recently discontinuing a medication due to induced diarrhea.     ALLERGIES:Penicillins, Ketoconazole, and Propoxyphene  PAST MEDICAL HISTORY:   Past Medical History:   Diagnosis Date     Anal cancer (H)      Endometriosis, site unspecified      History of malignant neoplasm of anus 7/26/2021     Periprosthetic fracture of right hip, subsequent encounter 7/26/2021     Thyroiditis, unspecified     Thryoiditis-resolved     PAST SURGICAL HISTORY:   has a past surgical history that includes surgical history of - ; Colonoscopy (N/A, 4/13/2017); appendectomy; Insert port vascular access (Right, 2/8/2018); and Arthroplasty hip (Right, 7/26/2018).  FAMILY HISTORY: family history includes Cancer in her maternal grandmother and sister; Heart Disease in her father; Uterine Cancer in her niece.  SOCIAL HISTORY:  reports that she has never smoked. She has never used smokeless tobacco. She reports that she does not drink alcohol and does not use drugs.    MEDICATIONS:  MED REC REQUIRED  Post Medication Reconciliation Status:            Review of your medicines            Accurate as of January 8, 2024 11:59 PM. If you have any questions, ask your nurse or doctor.                CONTINUE these medicines which have NOT CHANGED        Dose / Directions   acetaminophen 500 MG tablet  Commonly known as: TYLENOL      Dose: 1,000 mg  Take 1,000 mg by mouth 3 times daily  Refills: 0     Calcium Carbonate-Vitamin D 500-5 MG-MCG Tabs      Dose: 1 tablet  Take 1 tablet by mouth 2 times daily  Refills: 0     gabapentin 100 MG capsule  Commonly known as: NEURONTIN      Dose: 100 mg  Take 100 mg by mouth 3 times daily  Refills: 0     levothyroxine 50 MCG tablet  Commonly known as: SYNTHROID/LEVOTHROID  Indication: Underactive Thyroid      Dose: 50 mcg  Take 50 mcg by mouth daily  Refills: 0     mineral " "oil-hydrophilic petrolatum external ointment      Apply topically 2 times daily (Apply to legs and back of neck)  Refills: 0     Vitamin E 670 MG (1000 UT) Caps      Dose: 1 capsule  Take 1 capsule by mouth daily  Refills: 0            STOP taking      polyethylene glycol 17 GM/Dose powder  Commonly known as: MIRALAX  Stopped by: Jada Martinez, APRN CNP                  ROS:  10 point ROS of systems including Constitutional, Eyes, Respiratory, Cardiovascular, Gastroenterology, Genitourinary, Integumentary, Musculoskeletal, Psychiatric were all negative except for pertinent positives noted in my HPI.    Vitals:  /76   Pulse 76   Temp 98  F (36.7  C)   Resp 18   Ht 1.626 m (5' 4\")   Wt 48.5 kg (107 lb)   LMP  (LMP Unknown)   SpO2 97%   BMI 18.37 kg/m    Body mass index is 18.37 kg/m .  Exam:  GENERAL APPEARANCE:  Alert, elderly, in no distress, sitting bedside eating lunch  HEENT:  atraumatic, [place], EOM intact, partial dentition, moist mucus membranes  RESP:  non-labored breathing, lungs clear to auscultation, no respiratory distress, no cough  CV:  Rate regular, S1 S2 noted, mild BLE edema  ABDOMEN:  soft, non-distended, non-tender, bowel sounds normal  M/S:   uses WC and RW, moves all extremities, strength and tone equal bilaterally, no calf pain, arthritic changes noted in hands  SKIN:  warm, dry, thin, fragile, no obvious rash, lesions, ulcerations or petechiae  NEURO:   Face is symmetric, examination of sensation by touch normal, memory impaired, repetitive speech  PSYCH:  cooperative      Lab/Diagnostic data:   Labs reviewed as per [facility] and/or Care Everywhere.      ASSESSMENT/PLAN     Moderate late onset Alzheimer's dementia with anxiety (H)  Mood disorder (H24)  Recall difficulty noted.   -continue supportive cares    Age-related osteoporosis with current pathological fracture with routine healing, subsequent encounter  Pain  Hx of left proximal humerus fx and left distal radius fx (diagnosed in " 9/23). Per chart review, followed up with [facility] on 1/4/24; notes fractures have healed well, residual pain and decreased motion may be secondary to shoulder osteoarthritis.   -continue to WBAT and activities as tolerated  -continue home exercise program as provided by Ortho  -follow-up with ortho as needed  -continue calcium carbonate vit D 500-5 mg-mcg BID  -continue APAP 1000 mg TID  -continue gabapentin 100 mg TID    Muscle spasm  Chronic. Hx of using robaxin but discontinued in 11/2023 due to increased falls. Patient endorses spasms today which limit her ability to participate in facility activities. Patient is nervous starting a new medication as it may cause her to have diarrhea. Receptive to taking a low dose of magnesium and titrate up.   -start magnesium glycinate 100 mg capsule qAM; increase to 200 mg if tolerating  -monitor effectiveness    Hypothyroidism, unspecified type  TSH 5.68 (11/17/23).  -continue levothyroxine 50 mcg daily  -trend labs periodically    Kyphoscoliosis  Chronic. Diagnosed 4/2023.  -continue assistive cares    Protein-calorie malnutrition, unspecified severity (H24)  Weight 107#. (108-109 last month). Poor dentition. Denies difficulty chewing or swallowing food.   -continue 4oz nutrition shake daily  -encourage healthy food intake    Malignant neoplasm of anal canal (H)  Noted per chart review. Diagnosed 7/2021.  -follow-up with oncology depending on goals of care    Slow transit constipation  Denies concern today.  -no active treatment regimen  -monitor BMs    45 minutes spent on the date of this encounter doing chart review, review labs, discussion with nursing staff, patient visit, and documentation.       Electronically signed by:  Dr. Jada Martinez, DNP, APRN, AGNP-BC            Sincerely,        Jada Martinez, APRN CNP

## 2024-01-10 PROBLEM — G30.1 MODERATE LATE ONSET ALZHEIMER'S DEMENTIA WITH ANXIETY (H): Status: ACTIVE | Noted: 2024-01-10

## 2024-01-10 PROBLEM — F39 MOOD DISORDER (H): Status: ACTIVE | Noted: 2024-01-10

## 2024-01-10 PROBLEM — F02.B4 MODERATE LATE ONSET ALZHEIMER'S DEMENTIA WITH ANXIETY (H): Status: ACTIVE | Noted: 2024-01-10

## 2024-01-10 RX ORDER — OYSTER SHELL CALCIUM WITH VITAMIN D 500; 200 MG/1; [IU]/1
1 TABLET, FILM COATED ORAL 2 TIMES DAILY
COMMUNITY

## 2024-01-19 ENCOUNTER — DOCUMENTATION ONLY (OUTPATIENT)
Dept: GERIATRICS | Facility: CLINIC | Age: 84
End: 2024-01-19
Payer: COMMERCIAL

## 2024-01-23 DIAGNOSIS — C21.1 MALIGNANT NEOPLASM OF ANAL CANAL (H): Primary | ICD-10-CM

## 2024-01-23 NOTE — PROGRESS NOTES
"Colon and Rectal Surgery Follow-up Clinic Note    RE: Elizabeth Taylor  : 1940  SALBADOR: 23    DIAGNOSIS: mT2N1 anal canal squamous cell carcinoma (s/p CXRT [5,400 cGy], completed on 3/23/18).    INTERVAL HISTORY: Denies increased pain, fevers, or chills. Tolerating diet well. Having 3-4 mushy BMs per day. No BPR. Does have persistent but stable fecal urgency and fecal incontinence, mainly to liquid stool. Wears depends for this.      Physical Examination:  /72   Temp 97.4  F (36.3  C)   LMP  (LMP Unknown)   SpO2 96%   Abdomen soft, NT. No inguinal adenopathy palpated.  Perianal skin with no excoriation. Stable hyper/hypopigmentation.  BRITTNEY: no lesions palpated.  Flexible sigmoidoscopy: after obtaining informed consent and performing a \"time out\", an adult flexible sigmoidoscope was introduced through the anus and passed up to the mid sigmoid colon. The quality of the prep was good. Findings: 2 cm left anterior flat white scar right at the dentate line with no evidence of recurrent neoplasia.  Mild post radiation proctitis.  No additional abnormalities were seen. Total scope time: 12 minutes. The patient tolerated the procedure well.    ASSESSMENT  No evidence of persistent or recurrent neoplasia.    CT CAP 20:  IMPRESSION: No evidence of recurrent or residual disease in the chest,  abdomen, or pelvis.      3T MR pelvis 22  IMPRESSION: In this patient with history of anal cancer:  1. Continued complete response to treatment. No evidence of residual  or recurrent disease.Pelvis is partially obscured by artifacts from  right hip arthroplasty, No suspicious pelvic adenopathy visualized.   2. Slightly increased large left adnexal cyst, no new worrisome  features. Consider continued attention on follow-up.  3. Additional incidental findings as described above    Colonoscopy 22:      PLAN  1.  Cancer surveillance per medical oncology.  No further colorectal surgery follow-up " required.    20 minutes spent on the date of encounter performing chart review, history and exam, documentation and further activities as noted above with an additional 10 minutes for flexible sigmoidoscopy.     Emir Rosas MD, MSc, FACS, FASCRS.    Chief, Division of Colon & Rectal Surgery  Stanley M. Goldberg, MD Endowed Chair, Colon & Rectal Surgery  Department of Surgery  [facility]       Referring Provider:  Felisha Davis, CHAYITO  [address] [place] [address]     CC:  MD Zaire Mcleod MD Melissa Geller, MD

## 2024-01-29 ENCOUNTER — OFFICE VISIT (OUTPATIENT)
Dept: SURGERY | Facility: CLINIC | Age: 84
End: 2024-01-29
Payer: COMMERCIAL

## 2024-01-29 VITALS — TEMPERATURE: 97.4 F | OXYGEN SATURATION: 96 % | SYSTOLIC BLOOD PRESSURE: 109 MMHG | DIASTOLIC BLOOD PRESSURE: 72 MMHG

## 2024-01-29 DIAGNOSIS — C21.1 MALIGNANT NEOPLASM OF ANAL CANAL (H): Primary | ICD-10-CM

## 2024-01-29 PROCEDURE — 45330 DIAGNOSTIC SIGMOIDOSCOPY: CPT | Performed by: COLON & RECTAL SURGERY

## 2024-01-29 NOTE — LETTER
"2024       RE: Elizabeth Taylor  [facility]   [address]  [facility] [address]       Dear Colleague,    Thank you for referring your patient, Elizabeth Taylor, to the [facility] COLON AND RECTAL SURGERY CLINIC [place] [facility]. Please see a copy of my visit note below.    Colon and Rectal Surgery Follow-up Clinic Note    RE: Elizabeth Taylor  : 1940  SALBADOR: 23    DIAGNOSIS: mT2N1 anal canal squamous cell carcinoma (s/p CXRT [5,400 cGy], completed on 3/23/18).    INTERVAL HISTORY: Denies increased pain, fevers, or chills. Tolerating diet well. Having 3-4 mushy BMs per day. No BPR. Does have persistent but stable fecal urgency and fecal incontinence, mainly to liquid stool. Wears depends for this.      Physical Examination:  /72   Temp 97.4  F (36.3  C)   LMP  (LMP Unknown)   SpO2 96%   Abdomen soft, NT. No inguinal adenopathy palpated.  Perianal skin with no excoriation. Stable hyper/hypopigmentation.  BRITTNEY: no lesions palpated.  Flexible sigmoidoscopy: after obtaining informed consent and performing a \"time out\", an adult flexible sigmoidoscope was introduced through the anus and passed up to the mid sigmoid colon. The quality of the prep was good. Findings: 2 cm left anterior flat white scar right at the dentate line with no evidence of recurrent neoplasia.  Mild post radiation proctitis.  No additional abnormalities were seen. Total scope time: 12 minutes. The patient tolerated the procedure well.    ASSESSMENT  No evidence of persistent or recurrent neoplasia.    CT CAP 20:  IMPRESSION: No evidence of recurrent or residual disease in the chest,  abdomen, or pelvis.      3T MR pelvis 22  IMPRESSION: In this patient with history of anal cancer:  1. Continued complete response to treatment. No evidence of residual  or recurrent disease.Pelvis is partially obscured by artifacts from  right hip " arthroplasty, No suspicious pelvic adenopathy visualized.   2. Slightly increased large left adnexal cyst, no new worrisome  features. Consider continued attention on follow-up.  3. Additional incidental findings as described above    Colonoscopy 4/20/22:      PLAN  1.  Cancer surveillance per medical oncology.  No further colorectal surgery follow-up required.    20 minutes spent on the date of encounter performing chart review, history and exam, documentation and further activities as noted above with an additional 10 minutes for flexible sigmoidoscopy.       Referring Provider:  Felisha [place] [address] [place] [address]         Again, thank you for allowing me to participate in the care of your patient.      Sincerely,    Emir Rosas MD

## 2024-01-30 ENCOUNTER — PATIENT OUTREACH (OUTPATIENT)
Dept: GERIATRIC MEDICINE | Facility: CLINIC | Age: 84
End: 2024-01-30
Payer: COMMERCIAL

## 2024-02-15 NOTE — PROGRESS NOTES
[facility] Care Coordination Contact      [facility] Six-Month Telephone Assessment    6 month assessment completed 1.30.24.    ER visits: No  Hospitalizations: No  TCU stays: No  Significant health status changes: none  Falls/Injuries: No  ADL/IADL changes: No  Changes in services: No    Caregiver Assessment follow up:  none     Goals: See POC in chart for goal progress documentation.  Update     Will see member in 6 months for an annual health risk assessment.   Encouraged member to call CC with any questions or concerns in the meantime.     Natividad Thornton MA [facility] Care Coordinator   [phone] - jacq cell phone   [phone] - nmws fax

## 2024-03-01 ENCOUNTER — LAB REQUISITION (OUTPATIENT)
Dept: LAB | Facility: CLINIC | Age: 84
End: 2024-03-01
Payer: COMMERCIAL

## 2024-03-01 ENCOUNTER — TELEPHONE (OUTPATIENT)
Dept: GERIATRICS | Facility: CLINIC | Age: 84
End: 2024-03-01
Payer: COMMERCIAL

## 2024-03-01 DIAGNOSIS — Z13.0 ENCOUNTER FOR SCREENING FOR DISEASES OF THE BLOOD AND BLOOD-FORMING ORGANS AND CERTAIN DISORDERS INVOLVING THE IMMUNE MECHANISM: ICD-10-CM

## 2024-03-01 DIAGNOSIS — Z13.228 ENCOUNTER FOR SCREENING FOR OTHER METABOLIC DISORDERS: ICD-10-CM

## 2024-03-01 NOTE — TELEPHONE ENCOUNTER
[facility] Geriatrics Triage Nurse Telephone Encounter    Provider: GRAHAM Oneil CNP  Facility: [facility]  Facility Type:  LTC    Caller: Wolf   Call Back Number: [phone]    Allergies:    Allergies   Allergen Reactions    Penicillins Hives     As a child    Ketoconazole Rash    Propoxyphene Unknown     Had reaction in teen years        Reason for call: Nursing is calling to report that the patient showed her 4-5 tissues that had been full of bright red blood. The patient is stating that it came from her vagina, this happened at 830 this morning. No further bleeding. The nurse inspected the vagina and rectum and no signs of bleeding.   No c/o pain. Vitals: BP:  111/75  P:: 85  R:: 20  SPO2: 93% R/A Temp.:  97.7         Verbal Order/Direction given by Provider:   CMP, CBC w/diff on 3/4 and Pelvic ultrasound transabdominal dx vaginal bleeding     Provider giving Order:  Rosenstein, Benjamin MD    Verbal Order given to: Cyndi Owens RN

## 2024-03-04 ENCOUNTER — TELEPHONE (OUTPATIENT)
Dept: GERIATRICS | Facility: CLINIC | Age: 84
End: 2024-03-04

## 2024-03-04 ENCOUNTER — TRANSFERRED RECORDS (OUTPATIENT)
Dept: HEALTH INFORMATION MANAGEMENT | Facility: CLINIC | Age: 84
End: 2024-03-04

## 2024-03-04 LAB
ALBUMIN SERPL BCG-MCNC: 3.8 G/DL (ref 3.5–5.2)
ALP SERPL-CCNC: 86 U/L (ref 40–150)
ALT SERPL W P-5'-P-CCNC: 12 U/L (ref 0–50)
ANION GAP SERPL CALCULATED.3IONS-SCNC: 7 MMOL/L (ref 7–15)
AST SERPL W P-5'-P-CCNC: 22 U/L (ref 0–45)
BASOPHILS # BLD AUTO: 0 10E3/UL (ref 0–0.2)
BASOPHILS NFR BLD AUTO: 1 %
BILIRUB SERPL-MCNC: 0.9 MG/DL
BUN SERPL-MCNC: 18.3 MG/DL (ref 8–23)
CALCIUM SERPL-MCNC: 9.5 MG/DL (ref 8.8–10.2)
CHLORIDE SERPL-SCNC: 105 MMOL/L (ref 98–107)
CREAT SERPL-MCNC: 0.6 MG/DL (ref 0.51–0.95)
DEPRECATED HCO3 PLAS-SCNC: 30 MMOL/L (ref 22–29)
EGFRCR SERPLBLD CKD-EPI 2021: 88 ML/MIN/1.73M2
EOSINOPHIL # BLD AUTO: 0.1 10E3/UL (ref 0–0.7)
EOSINOPHIL NFR BLD AUTO: 2 %
ERYTHROCYTE [DISTWIDTH] IN BLOOD BY AUTOMATED COUNT: 13.6 % (ref 10–15)
GLUCOSE SERPL-MCNC: 80 MG/DL (ref 70–99)
HCT VFR BLD AUTO: 38.7 % (ref 35–47)
HGB BLD-MCNC: 12.5 G/DL (ref 11.7–15.7)
IMM GRANULOCYTES # BLD: 0 10E3/UL
IMM GRANULOCYTES NFR BLD: 1 %
LYMPHOCYTES # BLD AUTO: 0.5 10E3/UL (ref 0.8–5.3)
LYMPHOCYTES NFR BLD AUTO: 11 %
MCH RBC QN AUTO: 31.9 PG (ref 26.5–33)
MCHC RBC AUTO-ENTMCNC: 32.3 G/DL (ref 31.5–36.5)
MCV RBC AUTO: 99 FL (ref 78–100)
MONOCYTES # BLD AUTO: 0.5 10E3/UL (ref 0–1.3)
MONOCYTES NFR BLD AUTO: 11 %
NEUTROPHILS # BLD AUTO: 3.4 10E3/UL (ref 1.6–8.3)
NEUTROPHILS NFR BLD AUTO: 74 %
NRBC # BLD AUTO: 0 10E3/UL
NRBC BLD AUTO-RTO: 0 /100
PLATELET # BLD AUTO: 206 10E3/UL (ref 150–450)
POTASSIUM SERPL-SCNC: 4.1 MMOL/L (ref 3.4–5.3)
PROT SERPL-MCNC: 5.9 G/DL (ref 6.4–8.3)
RBC # BLD AUTO: 3.92 10E6/UL (ref 3.8–5.2)
SODIUM SERPL-SCNC: 142 MMOL/L (ref 135–145)
WBC # BLD AUTO: 4.5 10E3/UL (ref 4–11)

## 2024-03-04 PROCEDURE — 85025 COMPLETE CBC W/AUTO DIFF WBC: CPT | Mod: ORL | Performed by: FAMILY MEDICINE

## 2024-03-04 PROCEDURE — 36415 COLL VENOUS BLD VENIPUNCTURE: CPT | Mod: ORL | Performed by: FAMILY MEDICINE

## 2024-03-04 PROCEDURE — P9604 ONE-WAY ALLOW PRORATED TRIP: HCPCS | Mod: ORL | Performed by: FAMILY MEDICINE

## 2024-03-04 PROCEDURE — 80053 COMPREHEN METABOLIC PANEL: CPT | Mod: ORL | Performed by: FAMILY MEDICINE

## 2024-03-04 NOTE — TELEPHONE ENCOUNTER
[facility] Geriatrics Lab Note     Provider: GRAHAM Oneil CNP  Facility: [facility]  Facility Type:  LTC    Allergies   Allergen Reactions    Penicillins Hives     As a child    Ketoconazole Rash    Propoxyphene Unknown     Had reaction in teen years       Labs Reviewed by provider: Ultrasound for vaginal bleeding; CBC and BMP        Verbal Order/Direction given by Provider: CARLOS    Provider giving Order:  GRAHAM Oneil CNP    Verbal Order given to: Sukhwinder Padgett RN

## 2024-03-07 ENCOUNTER — ASSISTED LIVING VISIT (OUTPATIENT)
Dept: GERIATRICS | Facility: CLINIC | Age: 84
End: 2024-03-07
Payer: COMMERCIAL

## 2024-03-07 VITALS
WEIGHT: 109.8 LBS | SYSTOLIC BLOOD PRESSURE: 119 MMHG | DIASTOLIC BLOOD PRESSURE: 79 MMHG | OXYGEN SATURATION: 96 % | HEART RATE: 66 BPM | BODY MASS INDEX: 18.74 KG/M2 | TEMPERATURE: 97.3 F | RESPIRATION RATE: 18 BRPM | HEIGHT: 64 IN

## 2024-03-07 DIAGNOSIS — N85.00 ENDOMETRIAL HYPERPLASIA: ICD-10-CM

## 2024-03-07 DIAGNOSIS — N95.0 POSTMENOPAUSAL BLEEDING: Primary | ICD-10-CM

## 2024-03-07 DIAGNOSIS — M80.00XD AGE-RELATED OSTEOPOROSIS WITH CURRENT PATHOLOGICAL FRACTURE WITH ROUTINE HEALING, SUBSEQUENT ENCOUNTER: ICD-10-CM

## 2024-03-07 DIAGNOSIS — G30.1 MODERATE LATE ONSET ALZHEIMER'S DEMENTIA WITH ANXIETY (H): ICD-10-CM

## 2024-03-07 DIAGNOSIS — F02.B4 MODERATE LATE ONSET ALZHEIMER'S DEMENTIA WITH ANXIETY (H): ICD-10-CM

## 2024-03-07 DIAGNOSIS — C21.1 MALIGNANT NEOPLASM OF ANAL CANAL (H): ICD-10-CM

## 2024-03-07 PROCEDURE — 99310 SBSQ NF CARE HIGH MDM 45: CPT | Performed by: FAMILY MEDICINE

## 2024-03-07 NOTE — LETTER
"    3/7/2024        RE: Elizabeth Taylor  [facility]  [address] [address]        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     Seen today for regular visit and follow-up on ultrasound results.  She staff had reported last week that she was having significant vaginal bleeding.  She had shown them tissues soaked in blood.  Recommended to obtain TVUS.  This returned showing endometrial hyperplasia 13 mm.    Today she is generally doing well.  Her last episode of bleeding was on Saturday.  She had had multiple days of vaginal bleeding prior to then intermittently.  Some days heavier than others.  She had no pain with this.  Notes it was not associated with urination.  Describes as bleeding slowly from the vault.     Last menstrual period estimated to be around age 49.  She did report significant history of pain and cramping as well as heavy bleeding with her periods prior to then.  Of note has a history of a diagnosis of endometriosis.  Never had any symptoms since menopause.     She has had no significant weight loss.  No early satiety or bloating.  Appetite has been good.    Objective:   /79   Pulse 66   Temp 97.3  F (36.3  C)   Resp 18   Ht 1.626 m (5' 4\")   Wt 49.8 kg (109 lb 12.8 oz)   LMP  (LMP Unknown)   SpO2 96%   BMI 18.85 kg/m      GENERAL APPEARANCE: Seated comfortably, NAD  HENT:  NCAT, moderately [place], poor dentition  PULM  Normal WOB on RA  M/S:   Severe kyphosis  NEURO: Alert, answering questions appropriately, normal thought processes; CN II-XII grossly intact,  Normal Gait with walker  PSYCH: Mood normal with congruent affect, insight/judgment fair. Hoarding " behavior.      Recent Results (from the past 240 hour(s))   Comprehensive metabolic panel    Collection Time: 03/04/24 10:58 AM   Result Value Ref Range    Sodium 142 135 - 145 mmol/L    Potassium 4.1 3.4 - 5.3 mmol/L    Carbon Dioxide (CO2) 30 (H) 22 - 29 mmol/L    Anion Gap 7 7 - 15 mmol/L    Urea Nitrogen 18.3 8.0 - 23.0 mg/dL    Creatinine 0.60 0.51 - 0.95 mg/dL    GFR Estimate 88 >60 mL/min/1.73m2    Calcium 9.5 8.8 - 10.2 mg/dL    Chloride 105 98 - 107 mmol/L    Glucose 80 70 - 99 mg/dL    Alkaline Phosphatase 86 40 - 150 U/L    AST 22 0 - 45 U/L    ALT 12 0 - 50 U/L    Protein Total 5.9 (L) 6.4 - 8.3 g/dL    Albumin 3.8 3.5 - 5.2 g/dL    Bilirubin Total 0.9 <=1.2 mg/dL   CBC with platelets and differential    Collection Time: 03/04/24 10:58 AM   Result Value Ref Range    WBC Count 4.5 4.0 - 11.0 10e3/uL    RBC Count 3.92 3.80 - 5.20 10e6/uL    Hemoglobin 12.5 11.7 - 15.7 g/dL    Hematocrit 38.7 35.0 - 47.0 %    MCV 99 78 - 100 fL    MCH 31.9 26.5 - 33.0 pg    MCHC 32.3 31.5 - 36.5 g/dL    RDW 13.6 10.0 - 15.0 %    Platelet Count 206 150 - 450 10e3/uL    % Neutrophils 74 %    % Lymphocytes 11 %    % Monocytes 11 %    % Eosinophils 2 %    % Basophils 1 %    % Immature Granulocytes 1 %    NRBCs per 100 WBC 0 <1 /100    Absolute Neutrophils 3.4 1.6 - 8.3 10e3/uL    Absolute Lymphocytes 0.5 (L) 0.8 - 5.3 10e3/uL    Absolute Monocytes 0.5 0.0 - 1.3 10e3/uL    Absolute Eosinophils 0.1 0.0 - 0.7 10e3/uL    Absolute Basophils 0.0 0.0 - 0.2 10e3/uL    Absolute Immature Granulocytes 0.0 <=0.4 10e3/uL    Absolute NRBCs 0.0 10e3/uL       Assessment/Plan:    (N95.0) Postmenopausal bleeding  (primary encounter diagnosis)  (N85.00) Endometrial hyperplasia  Comment: Extended discussion today regarding postmenopausal bleeding and her ultrasound findings of a thickened endometrial stripe of 13 mm (see telephone note 3/4/2024 for ultrasound read).  Reviewed primary concern for possible endometrial carcinoma and discussed next  steps.  She wishes to undergo further evaluation with endometrial biopsy which is reasonable.  I discussed I would be comfortable doing this procedure, however there are likely multiple barriers to completing this at the facility, and did recommend a Gyn referral particularly to establish for ongoing management pending findings.  Plan: Ob/Gyn  Referral     (C21.1) Malignant neoplasm of anal canal (H)  Comment: Notably, did recently have follow-up with CRS and underwent flex sig with no concerning findings and recommended no further surveillance required.    (M80.00XD) Severe Age-related osteoporosis, T-score -4.0, with current pathological fracture with routine healing, subsequent encounter  Comment: Not discussed in detail today.  Had reviewed extensively with prior visit.  Unfortunately would not recommend bisphosphonate or denosumab given her poor dentition.  SERM may be reasonable particularly concerns above.    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  Given this, feel is reasonable given her goals and values and overall function to undergo endometrial biopsy as it is relatively low invasiveness and would provide information regarding next steps.  Certainly if this shows endometrial cancer, further discussion concerning tolerance of surgery and/or cancer directed therapies will be needed.    MED REC REQUIRE  Post Medication Reconciliation Status: patient was not discharged from an inpatient facility or TCU    Orders:  [x] Priority GYN referral       Electronically signed by:     Benjamin Rosenstein, MD, MA  [facility] Faculty     This note was completed with the assistance of dictation software. Typos and word substitution-errors are expected and unintended.      54  MINUTES SPENT BY ME on the date of service doing chart review, history, exam, documentation & further activities per the note.      Sincerely,        Benjamin Rosenstein, MD

## 2024-03-07 NOTE — PROGRESS NOTES
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     Seen today for regular visit and follow-up on ultrasound results.  She staff had reported last week that she was having significant vaginal bleeding.  She had shown them tissues soaked in blood.  Recommended to obtain TVUS.  This returned showing endometrial hyperplasia 13 mm.    Today she is generally doing well.  Her last episode of bleeding was on Saturday.  She had had multiple days of vaginal bleeding prior to then intermittently.  Some days heavier than others.  She had no pain with this.  Notes it was not associated with urination.  Describes as bleeding slowly from the vault.     Last menstrual period estimated to be around age 49.  She did report significant history of pain and cramping as well as heavy bleeding with her periods prior to then.  Of note has a history of a diagnosis of endometriosis.  Never had any symptoms since menopause.     She has had no significant weight loss.  No early satiety or bloating.  Appetite has been good.    Objective:   /79   Pulse 66   Temp 97.3  F (36.3  C)   Resp 18   Ht 1.626 m (5' 4\")   Wt 49.8 kg (109 lb 12.8 oz)   LMP  (LMP Unknown)   SpO2 96%   BMI 18.85 kg/m      GENERAL APPEARANCE: Seated comfortably, NAD  HENT:  NCAT, moderately [place], poor dentition  PULM  Normal WOB on RA  M/S:   Severe kyphosis  NEURO: Alert, answering questions appropriately, normal thought processes; CN II-XII grossly intact,  Normal Gait with walker  PSYCH: Mood normal with congruent affect, insight/judgment fair. Hoarding behavior.      Recent Results (from the past 240 hour(s))   Comprehensive metabolic panel    Collection Time: 03/04/24 " 10:58 AM   Result Value Ref Range    Sodium 142 135 - 145 mmol/L    Potassium 4.1 3.4 - 5.3 mmol/L    Carbon Dioxide (CO2) 30 (H) 22 - 29 mmol/L    Anion Gap 7 7 - 15 mmol/L    Urea Nitrogen 18.3 8.0 - 23.0 mg/dL    Creatinine 0.60 0.51 - 0.95 mg/dL    GFR Estimate 88 >60 mL/min/1.73m2    Calcium 9.5 8.8 - 10.2 mg/dL    Chloride 105 98 - 107 mmol/L    Glucose 80 70 - 99 mg/dL    Alkaline Phosphatase 86 40 - 150 U/L    AST 22 0 - 45 U/L    ALT 12 0 - 50 U/L    Protein Total 5.9 (L) 6.4 - 8.3 g/dL    Albumin 3.8 3.5 - 5.2 g/dL    Bilirubin Total 0.9 <=1.2 mg/dL   CBC with platelets and differential    Collection Time: 03/04/24 10:58 AM   Result Value Ref Range    WBC Count 4.5 4.0 - 11.0 10e3/uL    RBC Count 3.92 3.80 - 5.20 10e6/uL    Hemoglobin 12.5 11.7 - 15.7 g/dL    Hematocrit 38.7 35.0 - 47.0 %    MCV 99 78 - 100 fL    MCH 31.9 26.5 - 33.0 pg    MCHC 32.3 31.5 - 36.5 g/dL    RDW 13.6 10.0 - 15.0 %    Platelet Count 206 150 - 450 10e3/uL    % Neutrophils 74 %    % Lymphocytes 11 %    % Monocytes 11 %    % Eosinophils 2 %    % Basophils 1 %    % Immature Granulocytes 1 %    NRBCs per 100 WBC 0 <1 /100    Absolute Neutrophils 3.4 1.6 - 8.3 10e3/uL    Absolute Lymphocytes 0.5 (L) 0.8 - 5.3 10e3/uL    Absolute Monocytes 0.5 0.0 - 1.3 10e3/uL    Absolute Eosinophils 0.1 0.0 - 0.7 10e3/uL    Absolute Basophils 0.0 0.0 - 0.2 10e3/uL    Absolute Immature Granulocytes 0.0 <=0.4 10e3/uL    Absolute NRBCs 0.0 10e3/uL       Assessment/Plan:    (N95.0) Postmenopausal bleeding  (primary encounter diagnosis)  (N85.00) Endometrial hyperplasia  Comment: Extended discussion today regarding postmenopausal bleeding and her ultrasound findings of a thickened endometrial stripe of 13 mm (see telephone note 3/4/2024 for ultrasound read).  Reviewed primary concern for possible endometrial carcinoma and discussed next steps.  She wishes to undergo further evaluation with endometrial biopsy which is reasonable.  I discussed I would be  comfortable doing this procedure, however there are likely multiple barriers to completing this at the facility, and did recommend a Gyn referral particularly to establish for ongoing management pending findings.  Plan: Ob/Gyn  Referral     (C21.1) Malignant neoplasm of anal canal (H)  Comment: Notably, did recently have follow-up with CRS and underwent flex sig with no concerning findings and recommended no further surveillance required.    (M80.00XD) Severe Age-related osteoporosis, T-score -4.0, with current pathological fracture with routine healing, subsequent encounter  Comment: Not discussed in detail today.  Had reviewed extensively with prior visit.  Unfortunately would not recommend bisphosphonate or denosumab given her poor dentition.  SERM may be reasonable particularly concerns above.    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  Given this, feel is reasonable given her goals and values and overall function to undergo endometrial biopsy as it is relatively low invasiveness and would provide information regarding next steps.  Certainly if this shows endometrial cancer, further discussion concerning tolerance of surgery and/or cancer directed therapies will be needed.    MED REC REQUIRE  Post Medication Reconciliation Status: patient was not discharged from an inpatient facility or TCU    Orders:  [x] Priority GYN referral       Electronically signed by:     Benjamin Rosenstein, MD, MA  [facility] Faculty     This note was completed with the assistance of dictation software. Typos and word substitution-errors are expected and unintended.      54 MINUTES SPENT BY ME on the date of service doing chart review, history, exam, documentation & further activities per  the note.

## 2024-03-10 NOTE — TELEPHONE ENCOUNTER
RX PROGRESS NOTE: Vancomycin Therapeutic Drug Monitoring      Indication and target trough: Pneumonia (10-15 mcg/mL)    Anticipated duration of therapy and end date:    ALLERGIES:  No Known Allergies    Most recent height and weight information:  Weight: 117.9 kg (03/10/24 1002)  Height: 6' (182.9 cm) (03/10/24 1300)    The Following are the calculated  Current Weights for Dain Huston       Adjusted Ideal    93.7 kg 77.6 kg              Labs:  Serum Creatinine and Creatinine Clearance:  Serum creatinine: 1.8 mg/dL (H) 03/10/24 0700  Estimated creatinine clearance: 62.2 mL/min (A)    Microbiology Results       None              Assessment/Plan:  Briefly, this is a 53 year old male started on vancomycin for Pneumonia, with a target serum trough concentration of 10-15 mcg/mL. Initial dosing regimen will be 2000 mg loading dose once, followed by a maintenance dose of 1250 mg every 12 hours..    Pharmacy will continue to monitor levels, renal function, microbiology data, and risk factors for adverse events. Adjustments will be made if needed.    Thank you,    Aydin Johnson RPH  3/10/2024 1:24 PM       The patients sister was contacted today to schedule a two week follow up appointment for a wound check with Dr. Phan. The patient sister is agreeable and an appointment was offered today. The patients sister is agreeable and an appointment was made today while the patients sister was on the phone.

## 2024-03-25 ENCOUNTER — TELEPHONE (OUTPATIENT)
Dept: OBGYN | Facility: CLINIC | Age: 84
End: 2024-03-25

## 2024-03-25 ENCOUNTER — OFFICE VISIT (OUTPATIENT)
Dept: OBGYN | Facility: CLINIC | Age: 84
End: 2024-03-25
Attending: FAMILY MEDICINE
Payer: COMMERCIAL

## 2024-03-25 VITALS — HEIGHT: 64 IN | WEIGHT: 109 LBS | BODY MASS INDEX: 18.61 KG/M2

## 2024-03-25 DIAGNOSIS — N95.0 POSTMENOPAUSAL BLEEDING: ICD-10-CM

## 2024-03-25 DIAGNOSIS — N95.0 POSTMENOPAUSAL BLEEDING: Primary | ICD-10-CM

## 2024-03-25 PROCEDURE — 99459 PELVIC EXAMINATION: CPT | Performed by: OBSTETRICS & GYNECOLOGY

## 2024-03-25 PROCEDURE — 99205 OFFICE O/P NEW HI 60 MIN: CPT | Performed by: OBSTETRICS & GYNECOLOGY

## 2024-03-25 NOTE — TELEPHONE ENCOUNTER
"Per BE she would like to get in contact with Elizabeth's provider from her nursing home.    Appears that the referral was placed by Dr. Benjamin Rosenstein, MD    US ordered/ other provider is GRAHAM Oneil CNP    Called the number listed in the chart for the patients nurses station: [phone]  Phone first would not connect so attempted a second call with rang then went to a busy tone.    Called the general [facility] number at [phone]    Per rep patient is in the \"[facility]\" and transferred me to their nurse. Did not get through. Was able to leave a message for them to call back at my direct line.    Savita Brower RN    "

## 2024-03-25 NOTE — PATIENT INSTRUCTIONS
Endometrial Biopsy  Post-Procedure Patient Instructions      Please monitor for any of the following:      Fever   Cramping after 48 hours   Bleeding for 24-48 hours that is heavier than a normal period   Any bleeding that soaks more than 1 pad per hour      Please call your health care provider if you develop any of the above symptoms or problems.     [facility]   Nurse Triage Line [phone]      Use pads, not tampons, for the bleeding. You may resume sexual relations in 2-3 days after bleeding has stopped.

## 2024-03-26 NOTE — PROGRESS NOTES
"CC:  postmenopausal bleeding  HPI:  Elizabeth Taylor is a 83 year old female No LMP recorded (lmp unknown). Patient is postmenopausal. Here with her friend Katherine and sister Sahil.Patient is in a wheelchair.  Difficult historian but had bleeding episodes earlier this month and ultrasound was done and results are with her today at visit.    Had u/s performed 3/4/24 showing uterus 7.0x 3.1 x 4.7 cm and endometrium 13mm. 5.3 cm simple cyst in left adnexa unsure if ovarian or not.      Discussed thickened endometrium and possible causes including benign tissue, hyperplasia or possible endometrial cancer. Reviewed need for tissue sample of endometrium to determine next steps and endometrial biopsy procedure discussed in detail and patient signed consent.    Allergies: Penicillins, Ketoconazole, and Propoxyphene    EXAM:  Height 1.626 m (5' 4\"), weight 49.4 kg (109 lb), not currently breastfeeding.  General - pleasant female in no acute distress.  Pelvic - EG: adult female with postmenopausal skin atrophy and fusion noted of labia, clitoral randall, Vagina: very atrophic, no discharge, admits one finger Cervix: unable to see with narrow introitus and patient discomfort with exam **virginal spec used with xylocaine jelly and able to insert spec but cannot open to see cervix without patient in discomfort  Rectovaginal - deferred.  Psychiactric - appropriate mood and affect  Neurological - alert and oriented    Exam time of 10 min    prep time day of service 4 min  Total visit time with the patient 65 min  post visit work including documentation time 10 min  Total time: 79 min      ASSESSMENT/PLAN:  (N95.0) Postmenopausal bleeding  Comment: endometrium 13 mmm on ultrasound  Plan: stenotic introitus. Discussed cannot get tissue sample in office. Will discuss with her doctor and see if she would be a candidate for D&C in OR and this was discussed today and questions answered. Could then possibly place kyleena IUD for treatment as " well.     If she is not a candidate for operating room, also discussed an Econsult with gyn/onc and treatment with progesterone orally.        Samantha Portillo MD

## 2024-03-26 NOTE — TELEPHONE ENCOUNTER
Second attempt at reaching patient nurse at her nursing home.  Advised out provider is trying to get in touch with her provider at the nursing home to discuss the referral that was sent and her appointment yesterday.  Gave my direct call back number and advised to call back with a good contact name and number.    Savita Brower, RN

## 2024-03-27 ENCOUNTER — CLINICAL UPDATE (OUTPATIENT)
Dept: PHARMACY | Facility: CLINIC | Age: 84
End: 2024-03-27
Payer: COMMERCIAL

## 2024-03-27 DIAGNOSIS — C21.1 MALIGNANT NEOPLASM OF ANAL CANAL (H): Primary | ICD-10-CM

## 2024-03-27 DIAGNOSIS — Z78.9 TAKES DIETARY SUPPLEMENTS: ICD-10-CM

## 2024-03-27 DIAGNOSIS — R52 PAIN: ICD-10-CM

## 2024-03-27 DIAGNOSIS — R41.89 COGNITIVE IMPAIRMENT: ICD-10-CM

## 2024-03-27 PROCEDURE — 99207 PR NO CHARGE LOS: CPT | Performed by: PHARMACIST

## 2024-03-27 NOTE — PROGRESS NOTES
This patient's medication list and chart were reviewed as part of the service provided by [facility].    Assessment/Recommendations:    Consider d'c vitamin E - no indication, no added benefit.    Estimated Creatinine Clearance: 54.4 mL/min (based on SCr of 0.6 mg/dL).      Marleni Jewell, Pharm.D.,[facility]  Board Certified Geriatric Pharmacist  Medication Therapy Management Pharmacist  [phone]

## 2024-03-29 NOTE — TELEPHONE ENCOUNTER
Third attempt at reaching her nursing home staff to get a provider contact name and phone number.    Left detailed message stating who I was and who I was calling in regards to.   Stated that we seen this patient for a referral and that our provider would like to discuss this visit with the referring provider/the provider who sees Elizabeth at her nursing home.  Left my direct call back number and triage number.    Savita Brower RN

## 2024-03-29 NOTE — TELEPHONE ENCOUNTER
Pt care home returned call to clinic  Discussed that our provider would like to have provider to provider chat with either Dr. Benjamin Rosenstein, MD who placed the referral, or the NP who ordered the ultrasound [facility] gave us the number [phone]-this number will be to contact both of them. It is their triage RN number who can connect provider with one of theirs.    Lorena Aguirre RN on 3/29/2024 at 10:58 AM

## 2024-03-31 NOTE — TELEPHONE ENCOUNTER
No answer today of course.    I need to know if barney could have a D&C under [facility]? Can we get a message to her home to see how surgeries get arranged for patients?  I don't just want to put in a case request as then ivy and ankit just call her and she has mild dementia and that won't work.     Help please. This one is difficult.   Samantha Portillo MD

## 2024-04-01 NOTE — TELEPHONE ENCOUNTER
"Called the Geriatric RN triage line.    Per the RN I spoke with. He will forward a message to Dr. Rosenstein to reach out.    Gave BE pager number and RN triage direct line.    Advised that BE is OOO until 4/9 but that we will try to coordinate communication following her return.    Need to confirm if she is OK for surgery and how to go about scheduling these patients from the Nursing home.    From OV on 3/25/24:  \"ASSESSMENT/PLAN:  (N95.0) Postmenopausal bleeding  Comment: endometrium 13 mmm on ultrasound  Plan: stenotic introitus. Discussed cannot get tissue sample in office. Will discuss with her doctor and see if she would be a candidate for D&C in OR and this was discussed today and questions answered. Could then possibly place kyleena IUD for treatment as well.      If she is not a candidate for operating room, also discussed an Econsult with gyn/onc and treatment with progesterone orally.\"    Savita Brower RN    "

## 2024-04-01 NOTE — TELEPHONE ENCOUNTER
A provider to provider call would be best at this point because the Referring provider is not sure how to get surgery scheduled.  Not sure of next steps needed.  Phone number of the referring provider sent to Dr Portillo.

## 2024-04-11 NOTE — TELEPHONE ENCOUNTER
Case request done and called Katherine to update, left VM.   Will get scheduled for D&C and can have preop done there.    Samantha Portillo MD

## 2024-04-12 ENCOUNTER — TELEPHONE (OUTPATIENT)
Dept: OBGYN | Facility: CLINIC | Age: 84
End: 2024-04-12
Payer: COMMERCIAL

## 2024-04-12 NOTE — TELEPHONE ENCOUNTER
RC w/ Jose to discuss coordination of procedure w/ BE.     Elizabeth  She/her/hers  [facility]

## 2024-04-15 NOTE — TELEPHONE ENCOUNTER
Jose called back, LVM.     LVMTCB to Jose to coordinate procedure scheduling for patient.       Elizabeth Frost/her/hers  [facility]

## 2024-04-16 NOTE — TELEPHONE ENCOUNTER
Jose called back to discuss procedure scheduling. Jose states that we can schedule procedure any time now, but would also need to coordinate with patient's friend Katherine Canales who is involved with patient's care.     Informed Jose that I would reach out to Katherine to get her availability, then would coordinate with OR and Radiology to secure a procedure date and time, and then would call him back to confirm and finalize.     Informed Jose that I would try to call him back by the end of this week to finalize as he will be out of the office all next week.       Elizabeth Frost/her/hers  [facility]       Patient needs refill of her Celebrex 200mg and Oxycodone 5-325mg. Also she is asking for a note to return to work 11/05 full duty but stating patient needs to sit down for 10 mins every 2-3 hrs. Patient will  prescriptions and note at [address] [facility] office.   Please call her back at [phone]

## 2024-04-16 NOTE — TELEPHONE ENCOUNTER
Jose RICKETTSM yesterday afternoon.     LVMTCB today to discuss coordination of procedure.       Elizabeth  She/her/hers  [facility]

## 2024-04-16 NOTE — TELEPHONE ENCOUNTER
LVMTCB to Katherine to get her availability for patient's procedure scheduling.     Elizabeth Frost/her/hers  [facility]

## 2024-04-25 NOTE — TELEPHONE ENCOUNTER
LVMTCB to Katherine to get her availability for scheduling patient's procedure w/ BE.     Elizabeth  She/her/hers  [facility]

## 2024-04-29 NOTE — TELEPHONE ENCOUNTER
Katherine called back F 04/26/24, LVM.     Called Katherine today and discussed her availability for scheduling patient's procedure. Informed Katherine that I would give her a call back as soon as I have scheduling options to offer but is aware that coordinating may take time. Katherine understood that I will call back as soon as I have options coordinated.       Elizabeth Frost/her/hers  [facility]

## 2024-05-01 DIAGNOSIS — N95.0 POSTMENOPAUSAL BLEEDING: ICD-10-CM

## 2024-05-01 DIAGNOSIS — Z01.818 PRE-OP EXAM: Primary | ICD-10-CM

## 2024-05-02 NOTE — TELEPHONE ENCOUNTER
Katherine called back and confirmed that 05/15/24 would work for her.     LVMTCB to Jose at Assisted Living to confirm details with staff there.       Elizabeth Frost/her/hers  [facility]

## 2024-05-02 NOTE — TELEPHONE ENCOUNTER
LVMTCB to Katherine to discuss tentative DOS 05/15/24 w/ DESIREE Johnson  She/her/hers  [facility]

## 2024-05-02 NOTE — TELEPHONE ENCOUNTER
Type of surgery: gyn  Location of surgery: [facility] OR  Date and time of surgery: 05/15/24 1:35PM  Surgeon: Bandar  Pre-Op Appt Date: done at assisted living  Post-Op Appt Date: not needed   Packet sent out: Yes  Pre-cert/Authorization completed:  Not Applicable  Date: 05/02/24     CAROL Johnson  She/her/hers  [facility]

## 2024-05-08 DIAGNOSIS — Z01.818 PRE-OP EXAM: Primary | ICD-10-CM

## 2024-05-08 DIAGNOSIS — N95.0 POSTMENOPAUSAL BLEEDING: ICD-10-CM

## 2024-05-08 NOTE — TELEPHONE ENCOUNTER
LVM, TIME CHANGE TO 12:40PM.     Will call Jose at assisted living on F 05/10/24 to update on time change and ask if info packet arrived in the mail.       Elizabeth Frost/her/hers  [facility]

## 2024-05-10 NOTE — TELEPHONE ENCOUNTER
LVM with Jose about time change and to see if packet was received in the mail.     Elizabeth  She/her/hers  [facility]

## 2024-05-13 ENCOUNTER — OFFICE VISIT (OUTPATIENT)
Dept: GERIATRICS | Facility: CLINIC | Age: 84
End: 2024-05-13
Payer: COMMERCIAL

## 2024-05-13 VITALS
HEIGHT: 64 IN | DIASTOLIC BLOOD PRESSURE: 90 MMHG | RESPIRATION RATE: 16 BRPM | OXYGEN SATURATION: 97 % | HEART RATE: 86 BPM | BODY MASS INDEX: 18.06 KG/M2 | WEIGHT: 105.8 LBS | SYSTOLIC BLOOD PRESSURE: 127 MMHG | TEMPERATURE: 97.2 F

## 2024-05-13 DIAGNOSIS — G30.1 MODERATE LATE ONSET ALZHEIMER'S DEMENTIA WITH ANXIETY (H): Primary | ICD-10-CM

## 2024-05-13 DIAGNOSIS — M41.9 KYPHOSCOLIOSIS: ICD-10-CM

## 2024-05-13 DIAGNOSIS — Z79.899 POLYPHARMACY: ICD-10-CM

## 2024-05-13 DIAGNOSIS — M80.00XD AGE-RELATED OSTEOPOROSIS WITH CURRENT PATHOLOGICAL FRACTURE WITH ROUTINE HEALING, SUBSEQUENT ENCOUNTER: ICD-10-CM

## 2024-05-13 DIAGNOSIS — E03.9 HYPOTHYROIDISM, UNSPECIFIED TYPE: ICD-10-CM

## 2024-05-13 DIAGNOSIS — Z85.048 HISTORY OF MALIGNANT NEOPLASM OF ANUS: ICD-10-CM

## 2024-05-13 DIAGNOSIS — F41.9 ANXIETY: ICD-10-CM

## 2024-05-13 DIAGNOSIS — F02.B4 MODERATE LATE ONSET ALZHEIMER'S DEMENTIA WITH ANXIETY (H): Primary | ICD-10-CM

## 2024-05-13 DIAGNOSIS — E46 PROTEIN-CALORIE MALNUTRITION, UNSPECIFIED SEVERITY (H): ICD-10-CM

## 2024-05-13 DIAGNOSIS — N85.00 ENDOMETRIAL HYPERPLASIA: ICD-10-CM

## 2024-05-13 DIAGNOSIS — N95.0 POSTMENOPAUSAL BLEEDING: ICD-10-CM

## 2024-05-13 DIAGNOSIS — R52 PAIN: ICD-10-CM

## 2024-05-13 PROCEDURE — 99310 SBSQ NF CARE HIGH MDM 45: CPT

## 2024-05-13 PROCEDURE — 99418 PROLNG IP/OBS E/M EA 15 MIN: CPT

## 2024-05-13 NOTE — LETTER
"    5/13/2024        RE: Elizabeth Taylor  [facility] [address]  [place] [address]                                                                 [facility] GERIATRICS        Code Status:  DNR/DNI  Visit Type: Pre-Op Exam     Facility:   [facility] ([facility]) [[id number]]    Facility Type: Board and Care    History of Present Illness:     Elizabeth Taylor is a 83 year old female with PMH: mild cognitive impairment, osteoporosis, anal cancer, hypothyroidism, and frequent falls. She has been a resident at this facility since 07/2021. Patient was referred to Ob/GYN for postmenopausal bleeding. Visit with Ob/GYN on 3/25/24 noted simple cyst on ultrasound and recommended endometrial biopsy for further evaluation. Plan for D&C on 5/15/24.    Ms. Taylor is seen today for a visit in her room. She reports being \"in shock\" that she is getting surgery as she was unaware. Writer called friend, Katherine, for clarification. Katherine reports consent was given; patient's sister, Belen, is also aware of the procedure. Patient needed time to think about it and was receptive after talking with Katherine. Endorses chronic pain but \"feels good\" today. Appetite is good; notes chronic, intermittent nausea but today, \"I ate all of my breakfast\". Sleep: \"Well, I sleep\". Denies CP, palpitations, lightheadedness, dizziness, fatigue, SOB, fever, chills, bladder or bowel concerns today.      Labs ordered: N/A    Medication plan: Hold Calcium-Carbonate, magnesium gluconate, vitamin E day of procedure as advised.    Revised Cardiac Risk Index (RCRI)  The patient has the following serious cardiovascular risks for perioperative complications:   - No serious cardiac risks = 0 points     RCRI Interpretation: 0 points: Class I (very low risk - 0.4% complication rate)    Respiratory Risk: ARISCAT: 16 points, low risk      Patient is at  acceptable risk for surgery and is medically stable at this time.     History of Present Illness  Recent " Health  Fever: no  Chills: no  Fatigue:no  Chest Pain: no  Cough: no  Dyspnea: no  Urinary Frequency: chronic  Nausea: chronic intermittent  Vomiting: no  Diarrhea: no  Abdominal Pain: no  Easy Bruising: no  Lower Extremity Swelling: no  Poor Exercise Tolerance: no     Most recent Health Maintenance Visit:  2 months ago     Pertinent History  Prior Anesthesia: No adverse events   Previous Anesthesia Reaction: No adverse events  Diabetes: no  Cardiovascular Disease:no  Pulmonary Disease: no  Renal Disease:  no  GI Disease: Hx of anal cancer  Sleep Apnea: no  Thromboembolic Problems: no  Clotting Disorder: no  Bleeding Disorder: no  Transfusion Reaction:  no  Impaired Immunity: no  Steroid use in the last 6 months:  no  Frequent Aspirin use: no     Family history of no pertinent history      Social history of there are no concerns regarding care after surgery     After surgery, the patient plans to recover at [facility]         Past Medical History:   Diagnosis Date     Anal cancer (H)      Endometriosis, site unspecified      History of malignant neoplasm of anus 7/26/2021     Periprosthetic fracture of right hip, subsequent encounter 7/26/2021     Thyroiditis, unspecified     Thryoiditis-resolved     Past Surgical History:   Procedure Laterality Date     APPENDECTOMY      as a child     ARTHROPLASTY HIP Right 7/26/2018    Procedure: ARTHROPLASTY HIP;  Right Hip Hemiarthroplasty;  Surgeon: Zaire Phan MD;  [facility]     COLONOSCOPY N/A 4/13/2017    Procedure: COLONOSCOPY;  Surgeon: Juan Dos Santos MD;  Location: [facility] GI     INSERT PORT VASCULAR ACCESS Right 2/8/2018    Procedure: INSERT PORT VASCULAR ACCESS;  Place Single Lumen Venous Chest Port Placement;  Surgeon: Zaire Moreira PA-C;  [facility]     SURGICAL HISTORY OF -       endometriosis     Family History   Problem Relation Age of Onset     Cancer Sister      Cancer Maternal Grandmother         lymphoma      Heart Disease Father         MI     Uterine Cancer Niece      Social History     Socioeconomic History     Marital status: Single     Spouse name: Not on file     Number of children: Not on file     Years of education: Not on file     Highest education level: Not on file   Occupational History     Not on file   Tobacco Use     Smoking status: Never     Smokeless tobacco: Never   Vaping Use     Vaping status: Never Used   Substance and Sexual Activity     Alcohol use: No     Drug use: No     Sexual activity: Yes     Partners: Male   Other Topics Concern     Parent/sibling w/ CABG, MI or angioplasty before 65F 55M? Not Asked   Social History Narrative     Not on file     Social Determinants of Health     Financial Resource Strain: Medium Risk (2/12/2021)    Overall Financial Resource Strain (CARDIA)      Difficulty of Paying Living Expenses: Somewhat hard   Food Insecurity: No Food Insecurity (2/12/2021)    Hunger Vital Sign      Worried About Running Out of Food in the Last Year: Never true      Ran Out of Food in the Last Year: Never true   Transportation Needs: No Transportation Needs (2/12/2021)    PRAPARE - Transportation      Lack of Transportation (Medical): No      Lack of Transportation (Non-Medical): No   Physical Activity: Insufficiently Active (2/12/2021)    Exercise Vital Sign      Days of Exercise per Week: 5 days      Minutes of Exercise per Session: 10 min   Stress: Stress Concern Present (2/12/2021)    [place] [employer] - Occupational Stress Questionnaire      Feeling of Stress : To some extent   Social Connections: Not on file   Interpersonal Safety: Not on file   Housing Stability: Not on file       Current Outpatient Medications   Medication Sig Dispense Refill     acetaminophen (TYLENOL) 500 MG tablet Take 1,000 mg by mouth 3 times daily       Calcium Carbonate-Vitamin D 500-5 MG-MCG TABS Take 1 tablet by mouth 2 times daily       gabapentin (NEURONTIN) 100 MG capsule  "Take 100 mg by mouth 3 times daily       levothyroxine (SYNTHROID/LEVOTHROID) 50 MCG tablet Take 50 mcg by mouth daily       mineral oil-hydrophilic petrolatum (AQUAPHOR) external ointment Apply topically 2 times daily (Apply to legs and back of neck)       Vitamin E 670 MG (1000 UT) CAPS Take 1 capsule by mouth daily       Allergies   Allergen Reactions     Penicillins Hives     As a child     Ketoconazole Rash     Propoxyphene Unknown     Had reaction in teen years     Immunization History   Administered Date(s) Administered     COVID-19 12+ (2023-24) (MODERNA) 10/19/2023, 05/03/2024     COVID-19 Bivalent 18+ (Moderna) 05/23/2023     COVID-19 Monovalent 18+ (Moderna) 12/21/2021, 05/17/2022     COVID-19 Vaccine ([place]) 03/10/2021     Influenza Vaccine (Flucelvax Quadrivalent) 09/28/2021     Influenza Vaccine 65+ (FLUAD) 10/06/2022     Influenza Vaccine 65+ (Fluzone HD) 10/02/2023     Pneumococcal 20 valent Conjugate (Prevnar 20) 09/18/2023     RSV Vaccine (Abrysvo) 11/29/2023     Labs:  Labs reviewed as per [facility] and/or [facility] Everywhere.      Review of Systems    ROS: 10 point ROS neg other than the symptoms noted above in the HPI.    Physical Exam  BP (!) 127/90   Pulse 86   Temp 97.2  F (36.2  C)   Resp 16   Ht 1.626 m (5' 4\")   Wt 48 kg (105 lb 12.8 oz)   LMP  (LMP Unknown)   SpO2 97%   BMI 18.16 kg/m    GENERAL APPEARANCE:  Alert, elderly, in no distress, laying on bed  HEENT:  atraumatic, [place], EOM intact, moist mucus membranes  RESP:  non-labored breathing, lungs clear on auscultation, no respiratory distress, no cough  CV:  Rate regular, S1 S2 noted, no edema  ABDOMEN:  soft, non-distended, non-tender, bowel sounds active  M/S: uses walker and wheelchair, lumbar brace on, moves all extremities, strength and tone equal bilaterally,arthritic changes noted in hands  SKIN:  warm, dry, thin, fragile, no obvious rash, lesions, ulcerations or petechiae   NEURO:   Face is symmetric, examination of sensation " "by touch normal, answers questions, slow speech, memory impaired  PSYCH:  calm, anxious at times, cooperative    Electronically signed by: Dr. Jada Martinez, DNP, APRN, AGNP-BC      [facility] GERIATRICS    Chief Complaint   Patient presents with     [facility] Regulatory        HPI: Elizabeth Taylor is a 83 year old (1940), who is being seen today for an episodic care visit at: [facility] ([facility]) [[id number]]. HPI information obtained from: facility chart records, facility staff, patient report and [facility] chart review. PMH:  MCI, osteoporosis, anal cancer, hypothyroidism, and frequent falls. She has been a resident at this facility since 07/2021.     Ms. Taylor is seen today for a visit in her room. She reports being \"in shock\" that she is getting surgery as she was unaware. Writer called friend, Katherine, for clarification. Katherine reports consent was given; patient's sister, Belen, is also aware of the procedure. Patient needed time to think about it and was receptive after talking with Katherine. Reports, \"I struggle with my memory\". Endorses chronic pain but \"feels good\" today. She reports engaging in \"exercise for my back\" to help improve posture. Appetite is good; notes chronic, intermittent nausea but today, \"I ate all of my breakfast\". Denies vomiting. Regarding sleep: \"Well, I sleep\"; takes walks at night when she is unable fall asleep. Denies CP, palpitations, lightheadedness, dizziness, fatigue, SOB, fever, chills, bladder or bowel concerns today.       Allergies, and PMH/PSH reviewed in [facility] today.  REVIEW OF SYSTEMS:  ROS: 10 point ROS neg other than the symptoms noted above in the HPI.      Objective:   BP (!) 127/90   Pulse 86   Temp 97.2  F (36.2  C)   Resp 16   Ht 1.626 m (5' 4\")   Wt 48 kg (105 lb 12.8 oz)   LMP  (LMP Unknown)   SpO2 97%   BMI 18.16 kg/m    GENERAL APPEARANCE:  Alert, elderly, in no distress, laying on bed  HEENT:  atraumatic, [place], EOM intact, moist mucus " membranes  RESP:  non-labored breathing, lungs clear on auscultation, no respiratory distress, no cough  CV:  Rate regular, S1 S2 noted, no edema  ABDOMEN:  soft, non-distended, non-tender, bowel sounds active  M/S: uses walker and wheelchair, lumbar brace on, moves all extremities, strength and tone equal bilaterally,arthritic changes noted in hands  SKIN:  warm, dry, thin, fragile, no obvious rash, lesions, ulcerations or petechiae   NEURO:   Face is symmetric, examination of sensation by touch normal, answers questions, slow speech, memory impaired  PSYCH:  calm, anxious at times, cooperative      Labs reviewed as per [facility] and/or Care Everywhere.      Assessment/Plan:     Moderate late onset Alzheimer's dementia with anxiety (H)  Anxiety  Progressive. Significant memory impairment noted during visit. Becomes anxious when she is unable to recall information. Resides in & for supportive cares.  -continue 24/7 nursing and supportive cares  -monitor mood and behaviors    Age-related osteoporosis with current pathological fracture with routine healing, subsequent encounter  Pain  Hx of left proximal humerus fx and left distal radius fx (diagnosed in 9/23). Per chart review, followed up with [facility] on 1/4/24; notes fractures have healed well, residual pain and decreased motion may be secondary to shoulder osteoarthritis. Pain is well controlled.  -continue to WBAT and activities as tolerated  -follow-up with ortho as needed  -continue calcium carbonate vit D 500-5 mg-mcg BID  -continue APAP 1000 mg TID  -continue gabapentin 100 mg TID  -monitor effectiveness    Hypothyroidism, unspecified type  Last TSH 5.68 (11/17/23).  -continue levothyroxine 50 mcg daily  -trend labs periodically    Kyphoscoliosis  Chronic.  -continue lumbar brace for support    Protein-calorie malnutrition, unspecified severity (H24)  Weight 105# (5/12/2024). 109# last month.   -continue 4 ounce nutritional shake daily  -encourage healthy food  "intake  -follow weights    History of malignant neoplasm of anus  Diagnosed 7/2021.  -follow-up with oncology per goals of care    Postmenopausal bleeding  Endometrial hyperplasia  Ob/Gyn following; concern for endometrial carcinoma. Plan for endometrial biopsy on 5/15/24.   -endometrial biopsy as scheduled  -follow-up with Ob/Gyn as advised    Polypharmacy   Discussed with patient regarding vitamin E supplementation. Patient reports taking supplement for psychological benefit; \"I like to think it keeps me healthy\". Declined discontinuing it.   -continue vitamin E daily; discontinue when able    MED REC REQUIRED  Post Medication Reconciliation Status: patient was not discharged from an inpatient facility or TCU      Electronically signed by: Dr. Jada Martinez, DNP, APRN, AGNP-BC      Sincerely,        Jada Martinez, GRAHAM CNP      "

## 2024-05-13 NOTE — PROGRESS NOTES
"                                                         [facility] GERIATRICS        Code Status:  DNR/DNI  Visit Type: Pre-Op Exam     Facility:   [facility] ([facility]) [[id number]]    Facility Type: Board and Care    History of Present Illness:     Elizabeth Taylor is a 83 year old female with PMH: mild cognitive impairment, osteoporosis, anal cancer, hypothyroidism, and frequent falls. She has been a resident at this facility since 07/2021. Patient was referred to Ob/GYN for postmenopausal bleeding. Visit with Ob/GYN on 3/25/24 noted simple cyst on ultrasound and recommended endometrial biopsy for further evaluation. Plan for D&C on 5/15/24.    Ms. Taylor is seen today for a visit in her room. She reports being \"in shock\" that she is getting surgery as she was unaware. Writer called friend, Katherine, for clarification. Katherine reports consent was given; patient's sister, Belne, is also aware of the procedure. Patient needed time to think about it and was receptive after talking with Katherine. Endorses chronic pain but \"feels good\" today. Appetite is good; notes chronic, intermittent nausea but today, \"I ate all of my breakfast\". Sleep: \"Well, I sleep\". Denies CP, palpitations, lightheadedness, dizziness, fatigue, SOB, fever, chills, bladder or bowel concerns today.      Labs ordered: N/A    Medication plan: Hold Calcium-Carbonate, magnesium gluconate, vitamin E day of procedure as advised.    Revised Cardiac Risk Index (RCRI)  The patient has the following serious cardiovascular risks for perioperative complications:   - No serious cardiac risks = 0 points     RCRI Interpretation: 0 points: Class I (very low risk - 0.4% complication rate)    Respiratory Risk: ARISCAT: 16 points, low risk      Patient is at  acceptable risk for surgery and is medically stable at this time.     History of Present Illness  Recent Health  Fever: no  Chills: no  Fatigue:no  Chest Pain: no  Cough: no  Dyspnea: no  Urinary Frequency: " chronic  Nausea: chronic intermittent  Vomiting: no  Diarrhea: no  Abdominal Pain: no  Easy Bruising: no  Lower Extremity Swelling: no  Poor Exercise Tolerance: no     Most recent Health Maintenance Visit:  2 months ago     Pertinent History  Prior Anesthesia: No adverse events   Previous Anesthesia Reaction: No adverse events  Diabetes: no  Cardiovascular Disease:no  Pulmonary Disease: no  Renal Disease:  no  GI Disease: Hx of anal cancer  Sleep Apnea: no  Thromboembolic Problems: no  Clotting Disorder: no  Bleeding Disorder: no  Transfusion Reaction:  no  Impaired Immunity: no  Steroid use in the last 6 months:  no  Frequent Aspirin use: no     Family history of no pertinent history      Social history of there are no concerns regarding care after surgery     After surgery, the patient plans to recover at [facility]         Past Medical History:   Diagnosis Date    Anal cancer (H)     Endometriosis, site unspecified     History of malignant neoplasm of anus 7/26/2021    Periprosthetic fracture of right hip, subsequent encounter 7/26/2021    Thyroiditis, unspecified     Thryoiditis-resolved     Past Surgical History:   Procedure Laterality Date    APPENDECTOMY      as a child    ARTHROPLASTY HIP Right 7/26/2018    Procedure: ARTHROPLASTY HIP;  Right Hip Hemiarthroplasty;  Surgeon: Zaire Phan MD;  Location: [facility] OR    COLONOSCOPY N/A 4/13/2017    Procedure: COLONOSCOPY;  Surgeon: Juan Dos Santos MD;  Location: [facility] GI    INSERT PORT VASCULAR ACCESS Right 2/8/2018    Procedure: INSERT PORT VASCULAR ACCESS;  Place Single Lumen Venous Chest Port Placement;  Surgeon: Zaire Moreira PA-C;  [facility]    SURGICAL HISTORY OF -       endometriosis     Family History   Problem Relation Age of Onset    Cancer Sister     Cancer Maternal Grandmother         lymphoma    Heart Disease Father         MI    Uterine Cancer Niece      Social History     Socioeconomic History     Marital status: Single     Spouse name: Not on file    Number of children: Not on file    Years of education: Not on file    Highest education level: Not on file   Occupational History    Not on file   Tobacco Use    Smoking status: Never    Smokeless tobacco: Never   Vaping Use    Vaping status: Never Used   Substance and Sexual Activity    Alcohol use: No    Drug use: No    Sexual activity: Yes     Partners: Male   Other Topics Concern    Parent/sibling w/ CABG, MI or angioplasty before 65F 55M? Not Asked   Social History Narrative    Not on file     Social Determinants of Health     Financial Resource Strain: Medium Risk (2/12/2021)    Overall Financial Resource Strain (CARDIA)     Difficulty of Paying Living Expenses: Somewhat hard   Food Insecurity: No Food Insecurity (2/12/2021)    Hunger Vital Sign     Worried About Running Out of Food in the Last Year: Never true     Ran Out of Food in the Last Year: Never true   Transportation Needs: No Transportation Needs (2/12/2021)    PRAPARE - Transportation     Lack of Transportation (Medical): No     Lack of Transportation (Non-Medical): No   Physical Activity: Insufficiently Active (2/12/2021)    Exercise Vital Sign     Days of Exercise per Week: 5 days     Minutes of Exercise per Session: 10 min   Stress: Stress Concern Present (2/12/2021)    [place] of [employer] - Occupational Stress Questionnaire     Feeling of Stress : To some extent   Social Connections: Not on file   Interpersonal Safety: Not on file   Housing Stability: Not on file       Current Outpatient Medications   Medication Sig Dispense Refill    acetaminophen (TYLENOL) 500 MG tablet Take 1,000 mg by mouth 3 times daily      Calcium Carbonate-Vitamin D 500-5 MG-MCG TABS Take 1 tablet by mouth 2 times daily      gabapentin (NEURONTIN) 100 MG capsule Take 100 mg by mouth 3 times daily      levothyroxine (SYNTHROID/LEVOTHROID) 50 MCG tablet Take 50 mcg by mouth daily      mineral  "oil-hydrophilic petrolatum (AQUAPHOR) external ointment Apply topically 2 times daily (Apply to legs and back of neck)      Vitamin E 670 MG (1000 UT) CAPS Take 1 capsule by mouth daily       Allergies   Allergen Reactions    Penicillins Hives     As a child    Ketoconazole Rash    Propoxyphene Unknown     Had reaction in teen years     Immunization History   Administered Date(s) Administered    COVID-19 12+ (2023-24) (MODERNA) 10/19/2023, 05/03/2024    COVID-19 Bivalent 18+ (Moderna) 05/23/2023    COVID-19 Monovalent 18+ (Moderna) 12/21/2021, 05/17/2022    COVID-19 Vaccine ([place]) 03/10/2021    Influenza Vaccine (Flucelvax Quadrivalent) 09/28/2021    Influenza Vaccine 65+ (FLUAD) 10/06/2022    Influenza Vaccine 65+ (Fluzone HD) 10/02/2023    Pneumococcal 20 valent Conjugate (Prevnar 20) 09/18/2023    RSV Vaccine (Abrysvo) 11/29/2023     Labs:  Labs reviewed as per [facility] and/or Care Everywhere.      Review of Systems    ROS: 10 point ROS neg other than the symptoms noted above in the HPI.    Physical Exam  BP (!) 127/90   Pulse 86   Temp 97.2  F (36.2  C)   Resp 16   Ht 1.626 m (5' 4\")   Wt 48 kg (105 lb 12.8 oz)   LMP  (LMP Unknown)   SpO2 97%   BMI 18.16 kg/m    GENERAL APPEARANCE:  Alert, elderly, in no distress, laying on bed  HEENT:  atraumatic, [place], EOM intact, moist mucus membranes  RESP:  non-labored breathing, lungs clear on auscultation, no respiratory distress, no cough  CV:  Rate regular, S1 S2 noted, no edema  ABDOMEN:  soft, non-distended, non-tender, bowel sounds active  M/S: uses walker and wheelchair, lumbar brace on, moves all extremities, strength and tone equal bilaterally,arthritic changes noted in hands  SKIN:  warm, dry, thin, fragile, no obvious rash, lesions, ulcerations or petechiae   NEURO:   Face is symmetric, examination of sensation by touch normal, answers questions, slow speech, memory impaired  PSYCH:  calm, anxious at times, cooperative    30 minutes spent on the date of " this encounter doing chart review, review pertinent labs, patient visit, and documentation.       Electronically signed by: Dr. Jada Martinez, DNP, APRN, AGNP-BC

## 2024-05-13 NOTE — PROGRESS NOTES
"[facility] GERIATRICS    Chief Complaint   Patient presents with    intermediate Regulatory        HPI: Elizabeth Taylor is a 83 year old (1940), who is being seen today for an episodic care visit at: [facility] ([facility]) [[id number]]. HPI information obtained from: facility chart records, facility staff, patient report and [facility] chart review. PMH:  MCI, osteoporosis, anal cancer, hypothyroidism, and frequent falls. She has been a resident at this facility since 07/2021.     Ms. Taylor is seen today for a visit in her room. She reports being \"in shock\" that she is getting surgery as she was unaware. Writer called friend, Katherine, for clarification. Katherine reports consent was given; patient's sister, Belen, is also aware of the procedure. Patient needed time to think about it and was receptive after talking with Katherine. Reports, \"I struggle with my memory\". Endorses chronic pain but \"feels good\" today. She reports engaging in \"exercise for my back\" to help improve posture. Appetite is good; notes chronic, intermittent nausea but today, \"I ate all of my breakfast\". Denies vomiting. Regarding sleep: \"Well, I sleep\"; takes walks at night when she is unable fall asleep. Denies CP, palpitations, lightheadedness, dizziness, fatigue, SOB, fever, chills, bladder or bowel concerns today.       Allergies, and PMH/PSH reviewed in [facility] today.  REVIEW OF SYSTEMS:  ROS: 10 point ROS neg other than the symptoms noted above in the HPI.      Objective:   BP (!) 127/90   Pulse 86   Temp 97.2  F (36.2  C)   Resp 16   Ht 1.626 m (5' 4\")   Wt 48 kg (105 lb 12.8 oz)   LMP  (LMP Unknown)   SpO2 97%   BMI 18.16 kg/m    GENERAL APPEARANCE:  Alert, elderly, in no distress, laying on bed  HEENT:  atraumatic, [place], EOM intact, moist mucus membranes  RESP:  non-labored breathing, lungs clear on auscultation, no respiratory distress, no cough  CV:  Rate regular, S1 S2 noted, no edema  ABDOMEN:  soft, non-distended, non-tender, " bowel sounds active  M/S: uses walker and wheelchair, lumbar brace on, moves all extremities, strength and tone equal bilaterally,arthritic changes noted in hands  SKIN:  warm, dry, thin, fragile, no obvious rash, lesions, ulcerations or petechiae   NEURO:   Face is symmetric, examination of sensation by touch normal, answers questions, slow speech, memory impaired  PSYCH:  calm, anxious at times, cooperative      Labs reviewed as per Epic and/or Care Everywhere.      Assessment/Plan:     Moderate late onset Alzheimer's dementia with anxiety (H)  Anxiety  Progressive. Significant memory impairment noted during visit. Becomes anxious when she is unable to recall information. Resides in &C for supportive cares.  -continue 24/7 nursing and supportive cares  -monitor mood and behaviors    Age-related osteoporosis with current pathological fracture with routine healing, subsequent encounter  Pain  Hx of left proximal humerus fx and left distal radius fx (diagnosed in 9/23). Per chart review, followed up with [facility] on 1/4/24; notes fractures have healed well, residual pain and decreased motion may be secondary to shoulder osteoarthritis. Pain is well controlled.  -continue to WBAT and activities as tolerated  -follow-up with ortho as needed  -continue calcium carbonate vit D 500-5 mg-mcg BID  -continue APAP 1000 mg TID  -continue gabapentin 100 mg TID  -monitor effectiveness    Hypothyroidism, unspecified type  Last TSH 5.68 (11/17/23).  -continue levothyroxine 50 mcg daily  -trend labs periodically    Kyphoscoliosis  Chronic.  -continue lumbar brace for support    Protein-calorie malnutrition, unspecified severity (H24)  Weight 105# (5/12/2024). 109# last month.   -continue 4 ounce nutritional shake daily  -encourage healthy food intake  -follow weights    History of malignant neoplasm of anus  Diagnosed 7/2021.  -follow-up with oncology per goals of care    Postmenopausal bleeding  Endometrial hyperplasia  Ob/Gyn  "following; concern for endometrial carcinoma. Plan for endometrial biopsy on 5/15/24.   -endometrial biopsy as scheduled  -follow-up with Ob/Gyn as advised    Polypharmacy   Discussed with patient regarding vitamin E supplementation. Patient reports taking supplement for psychological benefit; \"I like to think it keeps me healthy\". Declined discontinuing it.   -continue vitamin E daily; discontinue when able      MED REC REQUIRED  Post Medication Reconciliation Status: patient was not discharged from an inpatient facility or TCU      45 minutes spent on the date of this encounter doing chart review, review labs, discussion with nursing staff, patient visit, and documentation.       Electronically signed by: Dr. Jada Martinez, DNP, APRN, AGNP-BC    "

## 2024-05-14 NOTE — OP NOTE
Operative Note   Name: Elizabeth Taylor  MRN:[id number]  : 1940  Date of Surgery: 05/15/2024    Pre-operative Diagnosis: Postmenopausal bleeding   Post-operative Diagnosis: Same  Procedure(s): Exam under anesthesia, dilation and curettage    Surgeon: Samantha Portillo MD   Assistants: Luis Gao MD PGY-4    Anesthesia: MAC and local  EBL: 5 mL   Urine Output: Not quantified  Fluids: 500 mL crystalloid    Specimens: Endometrial curettings   Complications: None apparent.  Findings: Exam under anesthesia revealed a small, retroverted uterus without adnexal masses.  Speculum exam revealed physiologic appearing fluid with a small cervix without atypical vasculature or lesions.  The cervix was dilated to 5-1/2 cm with serial Hegar dilators.  Sharp curettage was performed followed by endometrial biopsy without complication.  The entire procedure was done under ultrasound guidance.    Indications: Elizabeth Taylor is a 83 year old year old woman who was diagnosed with postmenopausal bleeding and thickened endometrial stripe. A dilation and curettage was recommended. She was counseled on the risks, benefits, and alternatives of the procedure, and she consented.   Procedure: The patient was taken to the operating room where she underwent MAC anesthesia without difficulty. She was placed in the dorsal lithotomy position. An examination was done and it was noted that her uterus was retroverted as noted above.  An additional 150 mL of sterile saline was used to backfill the bladder.  Following backfilled the bladder the patient spontaneously voided.  She was prepped and draped in the usual sterile fashion. A sterile speculum was inserted into the vagina.   A single tooth tenaculum was placed on that location. An additional 20 cc of 1% lidocaine was injected at 4 o'clock and 8 o'clock to create a paracervical block.   The cervix was serially dilated to 5.5 using serial Hegar dilators.  A Kevorkian curettage was used to  collect endometrial curettings which were placed on a piece of Telfa.  A circumferential gritty texture to the endometrium was noted.  Subsequently an endometrial biopsy was performed using an endometrial Pipelle without complication.  There was minimal bleeding noted and the tenaculum removed with good hemostasis noted. The patient tolerated the procedure well and was taken to the recovery area in stable condition.     Dr. Portillo was present for the entire procedure    Luis Gao MD  OBGYN PGY-4  May 15, 2024 1:59 PM    I was present and scrubbed for entirety of the surgical case and  agree with note as edited to reflect findings.      REAGAN PORTILLO MD

## 2024-05-15 ENCOUNTER — HOSPITAL ENCOUNTER (OUTPATIENT)
Facility: CLINIC | Age: 84
Discharge: HOME | End: 2024-05-15
Attending: OBSTETRICS & GYNECOLOGY | Admitting: OBSTETRICS & GYNECOLOGY
Payer: COMMERCIAL

## 2024-05-15 ENCOUNTER — ANESTHESIA EVENT (OUTPATIENT)
Dept: SURGERY | Facility: CLINIC | Age: 84
End: 2024-05-15
Payer: COMMERCIAL

## 2024-05-15 ENCOUNTER — ANESTHESIA (OUTPATIENT)
Dept: SURGERY | Facility: CLINIC | Age: 84
End: 2024-05-15
Payer: COMMERCIAL

## 2024-05-15 ENCOUNTER — HOSPITAL ENCOUNTER (OUTPATIENT)
Dept: ULTRASOUND IMAGING | Facility: CLINIC | Age: 84
Discharge: HOME | End: 2024-05-15
Attending: OBSTETRICS & GYNECOLOGY
Payer: COMMERCIAL

## 2024-05-15 VITALS
HEART RATE: 61 BPM | WEIGHT: 108.03 LBS | RESPIRATION RATE: 20 BRPM | DIASTOLIC BLOOD PRESSURE: 72 MMHG | TEMPERATURE: 98.1 F | SYSTOLIC BLOOD PRESSURE: 135 MMHG | HEIGHT: 64 IN | OXYGEN SATURATION: 89 % | BODY MASS INDEX: 18.44 KG/M2

## 2024-05-15 DIAGNOSIS — N95.0 POSTMENOPAUSAL BLEEDING: ICD-10-CM

## 2024-05-15 DIAGNOSIS — Z01.818 PRE-OP EXAM: ICD-10-CM

## 2024-05-15 LAB
ABO/RH(D): NORMAL
ANTIBODY SCREEN: NEGATIVE
ERYTHROCYTE [DISTWIDTH] IN BLOOD BY AUTOMATED COUNT: 14 % (ref 10–15)
HCT VFR BLD AUTO: 41.8 % (ref 35–47)
HGB BLD-MCNC: 13.5 G/DL (ref 11.7–15.7)
MCH RBC QN AUTO: 31.3 PG (ref 26.5–33)
MCHC RBC AUTO-ENTMCNC: 32.3 G/DL (ref 31.5–36.5)
MCV RBC AUTO: 97 FL (ref 78–100)
PLATELET # BLD AUTO: 213 10E3/UL (ref 150–450)
RBC # BLD AUTO: 4.31 10E6/UL (ref 3.8–5.2)
SPECIMEN EXPIRATION DATE: NORMAL
WBC # BLD AUTO: 6 10E3/UL (ref 4–11)

## 2024-05-15 PROCEDURE — 58120 DILATION AND CURETTAGE: CPT | Performed by: NURSE ANESTHETIST, CERTIFIED REGISTERED

## 2024-05-15 PROCEDURE — 88341 IMHCHEM/IMCYTCHM EA ADD ANTB: CPT | Mod: 26 | Performed by: STUDENT IN AN ORGANIZED HEALTH CARE EDUCATION/TRAINING PROGRAM

## 2024-05-15 PROCEDURE — 88342 IMHCHEM/IMCYTCHM 1ST ANTB: CPT | Mod: 26 | Performed by: STUDENT IN AN ORGANIZED HEALTH CARE EDUCATION/TRAINING PROGRAM

## 2024-05-15 PROCEDURE — 360N000076 HC SURGERY LEVEL 3, PER MIN: Performed by: OBSTETRICS & GYNECOLOGY

## 2024-05-15 PROCEDURE — 250N000011 HC RX IP 250 OP 636: Performed by: NURSE ANESTHETIST, CERTIFIED REGISTERED

## 2024-05-15 PROCEDURE — 250N000011 HC RX IP 250 OP 636: Performed by: OBSTETRICS & GYNECOLOGY

## 2024-05-15 PROCEDURE — 99100 ANES PT EXTEME AGE<1 YR&>70: CPT | Mod: QK | Performed by: STUDENT IN AN ORGANIZED HEALTH CARE EDUCATION/TRAINING PROGRAM

## 2024-05-15 PROCEDURE — 88360 TUMOR IMMUNOHISTOCHEM/MANUAL: CPT | Mod: 26 | Performed by: STUDENT IN AN ORGANIZED HEALTH CARE EDUCATION/TRAINING PROGRAM

## 2024-05-15 PROCEDURE — 250N000013 HC RX MED GY IP 250 OP 250 PS 637: Performed by: OBSTETRICS & GYNECOLOGY

## 2024-05-15 PROCEDURE — 370N000017 HC ANESTHESIA TECHNICAL FEE, PER MIN: Performed by: OBSTETRICS & GYNECOLOGY

## 2024-05-15 PROCEDURE — 88342 IMHCHEM/IMCYTCHM 1ST ANTB: CPT | Mod: TC,XU | Performed by: OBSTETRICS & GYNECOLOGY

## 2024-05-15 PROCEDURE — 58120 DILATION AND CURETTAGE: CPT | Performed by: STUDENT IN AN ORGANIZED HEALTH CARE EDUCATION/TRAINING PROGRAM

## 2024-05-15 PROCEDURE — 999N000141 HC STATISTIC PRE-PROCEDURE NURSING ASSESSMENT: Performed by: OBSTETRICS & GYNECOLOGY

## 2024-05-15 PROCEDURE — 99100 ANES PT EXTEME AGE<1 YR&>70: CPT | Mod: QX | Performed by: NURSE ANESTHETIST, CERTIFIED REGISTERED

## 2024-05-15 PROCEDURE — 710N000012 HC RECOVERY PHASE 2, PER MINUTE: Performed by: OBSTETRICS & GYNECOLOGY

## 2024-05-15 PROCEDURE — 999N000063 US INTRAOPERATIVE: Mod: TC

## 2024-05-15 PROCEDURE — 258N000003 HC RX IP 258 OP 636: Performed by: NURSE ANESTHETIST, CERTIFIED REGISTERED

## 2024-05-15 PROCEDURE — 85027 COMPLETE CBC AUTOMATED: CPT | Performed by: OBSTETRICS & GYNECOLOGY

## 2024-05-15 PROCEDURE — 58120 DILATION AND CURETTAGE: CPT | Mod: GC | Performed by: OBSTETRICS & GYNECOLOGY

## 2024-05-15 PROCEDURE — 86900 BLOOD TYPING SEROLOGIC ABO: CPT | Performed by: OBSTETRICS & GYNECOLOGY

## 2024-05-15 PROCEDURE — 272N000001 HC OR GENERAL SUPPLY STERILE: Performed by: OBSTETRICS & GYNECOLOGY

## 2024-05-15 PROCEDURE — 88305 TISSUE EXAM BY PATHOLOGIST: CPT | Mod: 26 | Performed by: STUDENT IN AN ORGANIZED HEALTH CARE EDUCATION/TRAINING PROGRAM

## 2024-05-15 PROCEDURE — 36415 COLL VENOUS BLD VENIPUNCTURE: CPT | Performed by: OBSTETRICS & GYNECOLOGY

## 2024-05-15 PROCEDURE — 250N000009 HC RX 250: Performed by: OBSTETRICS & GYNECOLOGY

## 2024-05-15 RX ORDER — ACETAMINOPHEN 325 MG/1
975 TABLET ORAL ONCE
Status: DISCONTINUED | OUTPATIENT
Start: 2024-05-15 | End: 2024-05-15 | Stop reason: HOSPADM

## 2024-05-15 RX ORDER — PROPOFOL 10 MG/ML
INJECTION, EMULSION INTRAVENOUS CONTINUOUS PRN
Status: DISCONTINUED | OUTPATIENT
Start: 2024-05-15 | End: 2024-05-15

## 2024-05-15 RX ORDER — FENTANYL CITRATE 50 UG/ML
INJECTION, SOLUTION INTRAMUSCULAR; INTRAVENOUS PRN
Status: DISCONTINUED | OUTPATIENT
Start: 2024-05-15 | End: 2024-05-15

## 2024-05-15 RX ORDER — ONDANSETRON 4 MG/1
4 TABLET, ORALLY DISINTEGRATING ORAL EVERY 30 MIN PRN
Status: DISCONTINUED | OUTPATIENT
Start: 2024-05-15 | End: 2024-05-15 | Stop reason: HOSPADM

## 2024-05-15 RX ORDER — NALOXONE HYDROCHLORIDE 0.4 MG/ML
0.1 INJECTION, SOLUTION INTRAMUSCULAR; INTRAVENOUS; SUBCUTANEOUS
Status: DISCONTINUED | OUTPATIENT
Start: 2024-05-15 | End: 2024-05-15 | Stop reason: HOSPADM

## 2024-05-15 RX ORDER — OXYCODONE HYDROCHLORIDE 5 MG/1
5 TABLET ORAL
Status: DISCONTINUED | OUTPATIENT
Start: 2024-05-15 | End: 2024-05-15 | Stop reason: HOSPADM

## 2024-05-15 RX ORDER — SODIUM CHLORIDE, SODIUM LACTATE, POTASSIUM CHLORIDE, CALCIUM CHLORIDE 600; 310; 30; 20 MG/100ML; MG/100ML; MG/100ML; MG/100ML
INJECTION, SOLUTION INTRAVENOUS CONTINUOUS PRN
Status: DISCONTINUED | OUTPATIENT
Start: 2024-05-15 | End: 2024-05-15

## 2024-05-15 RX ORDER — IBUPROFEN 600 MG/1
600 TABLET, FILM COATED ORAL EVERY 6 HOURS PRN
Qty: 30 TABLET | Refills: 0 | Status: SHIPPED | OUTPATIENT
Start: 2024-05-15 | End: 2024-07-27

## 2024-05-15 RX ORDER — HEPARIN SODIUM (PORCINE) LOCK FLUSH IV SOLN 100 UNIT/ML 100 UNIT/ML
500 SOLUTION INTRAVENOUS EVERY 8 HOURS
Status: COMPLETED | OUTPATIENT
Start: 2024-05-15 | End: 2024-05-15

## 2024-05-15 RX ORDER — SODIUM CHLORIDE, SODIUM LACTATE, POTASSIUM CHLORIDE, CALCIUM CHLORIDE 600; 310; 30; 20 MG/100ML; MG/100ML; MG/100ML; MG/100ML
INJECTION, SOLUTION INTRAVENOUS CONTINUOUS
Status: DISCONTINUED | OUTPATIENT
Start: 2024-05-15 | End: 2024-05-15 | Stop reason: HOSPADM

## 2024-05-15 RX ORDER — LIDOCAINE HYDROCHLORIDE 10 MG/ML
INJECTION, SOLUTION INFILTRATION; PERINEURAL PRN
Status: DISCONTINUED | OUTPATIENT
Start: 2024-05-15 | End: 2024-05-15 | Stop reason: HOSPADM

## 2024-05-15 RX ORDER — ONDANSETRON 2 MG/ML
4 INJECTION INTRAMUSCULAR; INTRAVENOUS EVERY 30 MIN PRN
Status: DISCONTINUED | OUTPATIENT
Start: 2024-05-15 | End: 2024-05-15 | Stop reason: HOSPADM

## 2024-05-15 RX ORDER — DEXAMETHASONE SODIUM PHOSPHATE 4 MG/ML
4 INJECTION, SOLUTION INTRA-ARTICULAR; INTRALESIONAL; INTRAMUSCULAR; INTRAVENOUS; SOFT TISSUE
Status: DISCONTINUED | OUTPATIENT
Start: 2024-05-15 | End: 2024-05-15 | Stop reason: HOSPADM

## 2024-05-15 RX ORDER — ACETAMINOPHEN 325 MG/1
975 TABLET ORAL ONCE
Status: COMPLETED | OUTPATIENT
Start: 2024-05-15 | End: 2024-05-15

## 2024-05-15 RX ORDER — HYDROMORPHONE HYDROCHLORIDE 1 MG/ML
0.2 INJECTION, SOLUTION INTRAMUSCULAR; INTRAVENOUS; SUBCUTANEOUS EVERY 5 MIN PRN
Status: DISCONTINUED | OUTPATIENT
Start: 2024-05-15 | End: 2024-05-15 | Stop reason: HOSPADM

## 2024-05-15 RX ORDER — FENTANYL CITRATE 50 UG/ML
50 INJECTION, SOLUTION INTRAMUSCULAR; INTRAVENOUS EVERY 5 MIN PRN
Status: DISCONTINUED | OUTPATIENT
Start: 2024-05-15 | End: 2024-05-15 | Stop reason: HOSPADM

## 2024-05-15 RX ORDER — DEXAMETHASONE SODIUM PHOSPHATE 4 MG/ML
INJECTION, SOLUTION INTRA-ARTICULAR; INTRALESIONAL; INTRAMUSCULAR; INTRAVENOUS; SOFT TISSUE PRN
Status: DISCONTINUED | OUTPATIENT
Start: 2024-05-15 | End: 2024-05-15

## 2024-05-15 RX ORDER — FENTANYL CITRATE 50 UG/ML
25 INJECTION, SOLUTION INTRAMUSCULAR; INTRAVENOUS EVERY 5 MIN PRN
Status: DISCONTINUED | OUTPATIENT
Start: 2024-05-15 | End: 2024-05-15 | Stop reason: HOSPADM

## 2024-05-15 RX ORDER — ACETAMINOPHEN 325 MG/1
975 TABLET ORAL EVERY 6 HOURS PRN
Qty: 50 TABLET | Refills: 0 | Status: SHIPPED | OUTPATIENT
Start: 2024-05-15

## 2024-05-15 RX ORDER — OXYCODONE HYDROCHLORIDE 5 MG/1
10 TABLET ORAL
Status: DISCONTINUED | OUTPATIENT
Start: 2024-05-15 | End: 2024-05-15 | Stop reason: HOSPADM

## 2024-05-15 RX ORDER — IBUPROFEN 600 MG/1
600 TABLET, FILM COATED ORAL ONCE
Status: DISCONTINUED | OUTPATIENT
Start: 2024-05-15 | End: 2024-05-15 | Stop reason: HOSPADM

## 2024-05-15 RX ORDER — ONDANSETRON 2 MG/ML
INJECTION INTRAMUSCULAR; INTRAVENOUS PRN
Status: DISCONTINUED | OUTPATIENT
Start: 2024-05-15 | End: 2024-05-15

## 2024-05-15 RX ORDER — HYDROMORPHONE HYDROCHLORIDE 1 MG/ML
0.4 INJECTION, SOLUTION INTRAMUSCULAR; INTRAVENOUS; SUBCUTANEOUS EVERY 5 MIN PRN
Status: DISCONTINUED | OUTPATIENT
Start: 2024-05-15 | End: 2024-05-15 | Stop reason: HOSPADM

## 2024-05-15 RX ORDER — PROPOFOL 10 MG/ML
INJECTION, EMULSION INTRAVENOUS PRN
Status: DISCONTINUED | OUTPATIENT
Start: 2024-05-15 | End: 2024-05-15

## 2024-05-15 RX ADMIN — PROPOFOL 10 MG: 10 INJECTION, EMULSION INTRAVENOUS at 13:25

## 2024-05-15 RX ADMIN — PROPOFOL 10 MG: 10 INJECTION, EMULSION INTRAVENOUS at 13:36

## 2024-05-15 RX ADMIN — PROPOFOL 10 MG: 10 INJECTION, EMULSION INTRAVENOUS at 13:39

## 2024-05-15 RX ADMIN — PROPOFOL 10 MG: 10 INJECTION, EMULSION INTRAVENOUS at 13:24

## 2024-05-15 RX ADMIN — PROPOFOL 10 MG: 10 INJECTION, EMULSION INTRAVENOUS at 13:33

## 2024-05-15 RX ADMIN — SODIUM CHLORIDE, POTASSIUM CHLORIDE, SODIUM LACTATE AND CALCIUM CHLORIDE: 600; 310; 30; 20 INJECTION, SOLUTION INTRAVENOUS at 13:18

## 2024-05-15 RX ADMIN — FENTANYL CITRATE 25 MCG: 50 INJECTION INTRAMUSCULAR; INTRAVENOUS at 13:24

## 2024-05-15 RX ADMIN — ACETAMINOPHEN 975 MG: 325 TABLET, FILM COATED ORAL at 12:00

## 2024-05-15 RX ADMIN — HEPARIN 500 UNITS: 100 SYRINGE at 15:20

## 2024-05-15 RX ADMIN — ONDANSETRON 4 MG: 2 INJECTION INTRAMUSCULAR; INTRAVENOUS at 13:24

## 2024-05-15 RX ADMIN — DEXAMETHASONE SODIUM PHOSPHATE 4 MG: 4 INJECTION, SOLUTION INTRA-ARTICULAR; INTRALESIONAL; INTRAMUSCULAR; INTRAVENOUS; SOFT TISSUE at 13:24

## 2024-05-15 RX ADMIN — PROPOFOL 75 MCG/KG/MIN: 10 INJECTION, EMULSION INTRAVENOUS at 13:24

## 2024-05-15 RX ADMIN — FENTANYL CITRATE 12.5 MCG: 50 INJECTION INTRAMUSCULAR; INTRAVENOUS at 13:42

## 2024-05-15 RX ADMIN — PROPOFOL 10 MG: 10 INJECTION, EMULSION INTRAVENOUS at 13:35

## 2024-05-15 RX ADMIN — FENTANYL CITRATE 12.5 MCG: 50 INJECTION INTRAMUSCULAR; INTRAVENOUS at 13:45

## 2024-05-15 ASSESSMENT — ACTIVITIES OF DAILY LIVING (ADL)
ADLS_ACUITY_SCORE: 42
ADLS_ACUITY_SCORE: 42
ADLS_ACUITY_SCORE: 36
ADLS_ACUITY_SCORE: 38
ADLS_ACUITY_SCORE: 42

## 2024-05-15 NOTE — ANESTHESIA PREPROCEDURE EVALUATION
Anesthesia Pre-Procedure Evaluation    Patient: Elizabeth Taylor   MRN: [id number] : 1940        Procedure : Procedure(s):  DILATION AND CURETTAGE, UTERUS, WITH ULTRASOUND GUIDANCE          Past Medical History:   Diagnosis Date    Anal cancer (H)     Endometriosis, site unspecified     History of malignant neoplasm of anus 2021    Periprosthetic fracture of right hip, subsequent encounter 2021    Thyroiditis, unspecified     Thryoiditis-resolved      Past Surgical History:   Procedure Laterality Date    APPENDECTOMY      as a child    ARTHROPLASTY HIP Right 2018    Procedure: ARTHROPLASTY HIP;  Right Hip Hemiarthroplasty;  Surgeon: Zaire Phan MD;  Location: [facility] OR    COLONOSCOPY N/A 2017    Procedure: COLONOSCOPY;  Surgeon: Juan Dos Santos MD;  Location: [facility] GI    INSERT PORT VASCULAR ACCESS Right 2018    Procedure: INSERT PORT VASCULAR ACCESS;  Place Single Lumen Venous Chest Port Placement;  Surgeon: Zaire Moreira PA-C;  Location: [facility] OR    SURGICAL HISTORY OF -       endometriosis      Allergies   Allergen Reactions    Penicillins Hives     As a child    Ketoconazole Rash    Propoxyphene Unknown     Had reaction in teen years      Social History     Tobacco Use    Smoking status: Never    Smokeless tobacco: Never   Substance Use Topics    Alcohol use: No      Wt Readings from Last 1 Encounters:   24 48 kg (105 lb 12.8 oz)      Elizabeth Taylor is a 83 year old (1940) with  PMH:  MCI, osteoporosis, anal cancer, hypothyroidism, and frequent falls.   Anesthesia Evaluation   Pt has had prior anesthetic.     No history of anesthetic complications       ROS/MED HX  ENT/Pulmonary:  - neg pulmonary ROS     Neurologic: Comment: MCI - neg neurologic ROS     Cardiovascular:  - neg cardiovascular ROS     METS/Exercise Tolerance:     Hematologic: Comments: Postmenopausal bleeding      Musculoskeletal:  - neg musculoskeletal ROS     GI/Hepatic:  - neg  GI/hepatic ROS     Renal/Genitourinary: Comment: Postmenopausal bleeding      Endo:     (+)          thyroid problem, hypothyroidism,           Psychiatric/Substance Use:  - neg psychiatric ROS     Infectious Disease:       Malignancy:   (+) Malignancy, History of Other.    Other:  - neg other ROS          Physical Exam    Airway  airway exam normal      Mallampati: II   TM distance: > 3 FB   Neck ROM: full   Mouth opening: > 3 cm    Respiratory Devices and Support         Dental       (+) Multiple visibly decayed, broken teeth      Cardiovascular   cardiovascular exam normal          Pulmonary   pulmonary exam normal                OUTSIDE LABS:  CBC:   Lab Results   Component Value Date    WBC 4.5 03/04/2024    WBC 6.7 07/19/2023    HGB 12.5 03/04/2024    HGB 13.4 07/19/2023    HCT 38.7 03/04/2024    HCT 42.7 07/19/2023     03/04/2024     07/19/2023     BMP:   Lab Results   Component Value Date     03/04/2024     12/18/2023    POTASSIUM 4.1 03/04/2024    POTASSIUM 4.5 12/18/2023    CHLORIDE 105 03/04/2024    CHLORIDE 103 12/18/2023    CO2 30 (H) 03/04/2024    CO2 27 12/18/2023    BUN 18.3 03/04/2024    BUN 18.8 12/18/2023    CR 0.60 03/04/2024    CR 0.65 12/18/2023    GLC 80 03/04/2024     (H) 12/18/2023     COAGS:   Lab Results   Component Value Date    PTT 34 04/02/2018    INR 1.02 07/20/2021     POC:   Lab Results   Component Value Date    BGM 95 05/25/2018     HEPATIC:   Lab Results   Component Value Date    ALBUMIN 3.8 03/04/2024    PROTTOTAL 5.9 (L) 03/04/2024    ALT 12 03/04/2024    AST 22 03/04/2024    ALKPHOS 86 03/04/2024    BILITOTAL 0.9 03/04/2024    RADHA 36 03/17/2018     OTHER:   Lab Results   Component Value Date    LACT 0.6 (L) 03/19/2018    ELISE 9.5 03/04/2024    PHOS 4.2 11/05/2018    MAG 2.0 07/14/2023    LIPASE 46 (L) 03/19/2018    TSH 5.68 (H) 11/17/2023    T4 1.13 11/17/2023    CRP <2.9 06/14/2021    SED 8 06/14/2021       Anesthesia Plan    ASA Status:  3     NPO Status:  NPO Appropriate    Anesthesia Type: MAC.     - Reason for MAC: immobility needed   Induction: Intravenous.   Maintenance: TIVA.        Consents    Anesthesia Plan(s) and associated risks, benefits, and realistic alternatives discussed. Questions answered and patient/representative(s) expressed understanding.     - Discussed: Risks, Benefits and Alternatives for BOTH SEDATION and the PROCEDURE were discussed     - Discussed with:  Patient      - Extended Intubation/Ventilatory Support Discussed: No.      - Patient is DNR/DNI Status: No     Use of blood products discussed: Yes.     - Discussed with: Patient.     - Consented: consented to blood products     Postoperative Care    Pain management: IV analgesics.     - Plan for long acting post-op opioid use   PONV prophylaxis: Ondansetron (or other 5HT-3), Dexamethasone or Solumedrol, Background Propofol Infusion     Comments:               Geovanna Ruiz MD    I have reviewed the pertinent notes and labs in the chart from the past 30 days and (re)examined the patient.  Any updates or changes from those notes are reflected in this note.

## 2024-05-15 NOTE — OR NURSING
Elizabeth has some memory issues, but seems aware of procedure to happen and answers my questions appropriately

## 2024-05-15 NOTE — ANESTHESIA CARE TRANSFER NOTE
Patient: Elizabeth Taylor    Procedure: Procedure(s):  DILATION AND CURETTAGE, UTERUS, WITH ULTRASOUND GUIDANCE       Diagnosis: Postmenopausal bleeding [N95.0]  Diagnosis Additional Information: No value filed.    Anesthesia Type:   MAC     Note:    Oropharynx: oropharynx clear of all foreign objects  Level of Consciousness: drowsy  Oxygen Supplementation: room air    Independent Airway: airway patency satisfactory and stable  Dentition: dentition unchanged  Vital Signs Stable: post-procedure vital signs reviewed and stable  Report to RN Given: handoff report given  Patient transferred to: Phase II    Handoff Report: Identifed the Patient, Identified the Reponsible Provider, Reviewed the pertinent medical history, Discussed the surgical course, Reviewed Intra-OP anesthesia mangement and issues during anesthesia, Set expectations for post-procedure period and Allowed opportunity for questions and acknowledgement of understanding      Vitals:  Vitals Value Taken Time   /63 05/15/24 1406   Temp     Pulse 54 05/15/24 1406   Resp     SpO2 97 % 05/15/24 1408   Vitals shown include unfiled device data.    Electronically Signed By: GRAHAM Conroy CRNA  May 15, 2024  2:09 PM

## 2024-05-16 NOTE — ANESTHESIA POSTPROCEDURE EVALUATION
Patient: Elizabeth Taylor    Procedure: Procedure(s):  DILATION AND CURETTAGE, UTERUS, WITH ULTRASOUND GUIDANCE       Anesthesia Type:  MAC    Note:  Disposition: Outpatient   Postop Pain Control: Uneventful            Sign Out: Well controlled pain   PONV: No   Neuro/Psych: Uneventful            Sign Out: Acceptable/Baseline neuro status   Airway/Respiratory: Uneventful            Sign Out: Acceptable/Baseline resp. status   CV/Hemodynamics: Uneventful            Sign Out: Acceptable CV status; No obvious hypovolemia; No obvious fluid overload   Other NRE: NONE   DID A NON-ROUTINE EVENT OCCUR? No           Last vitals:  Vitals Value Taken Time   /72 05/15/24 1500   Temp 36.7  C (98.1  F) 05/15/24 1500   Pulse 61 05/15/24 1500   Resp 20 05/15/24 1500   SpO2 89 % 05/15/24 1454   Vitals shown include unfiled device data.    Electronically Signed By: Geovanna Ruiz MD  May 16, 2024  2:23 PM

## 2024-05-17 ENCOUNTER — PATIENT OUTREACH (OUTPATIENT)
Dept: GERIATRIC MEDICINE | Facility: CLINIC | Age: 84
End: 2024-05-17
Payer: COMMERCIAL

## 2024-05-17 ENCOUNTER — TELEPHONE (OUTPATIENT)
Dept: OBGYN | Facility: CLINIC | Age: 84
End: 2024-05-17
Payer: COMMERCIAL

## 2024-05-17 ENCOUNTER — PATIENT OUTREACH (OUTPATIENT)
Dept: ONCOLOGY | Facility: CLINIC | Age: 84
End: 2024-05-17
Payer: COMMERCIAL

## 2024-05-17 DIAGNOSIS — C54.1 ENDOMETRIAL CANCER DETERMINED BY UTERINE BIOPSY (H): Primary | ICD-10-CM

## 2024-05-17 LAB
PATH REPORT.COMMENTS IMP SPEC: ABNORMAL
PATH REPORT.COMMENTS IMP SPEC: ABNORMAL
PATH REPORT.COMMENTS IMP SPEC: YES
PATH REPORT.FINAL DX SPEC: ABNORMAL
PATH REPORT.GROSS SPEC: ABNORMAL
PATH REPORT.MICROSCOPIC SPEC OTHER STN: ABNORMAL
PATH REPORT.RELEVANT HX SPEC: ABNORMAL
PATHOLOGY SYNOPTIC REPORT: ABNORMAL
PHOTO IMAGE: ABNORMAL

## 2024-05-17 NOTE — PROGRESS NOTES
"New Patient Hematology / Oncology Nurse Navigator Note     Referral Date: 5/17/24    Referring provider:   Samantha Portillo MD   [address] [place] [address]   Phone: [phone]   Fax: [phone]       Referring Clinic/Organization: [facility]     Referred to: GynOnc    Requested provider (if applicable): Former pt of Dr. Goldman (last visit 2020). Will have pt re-establish per location preference as Dr. Goldman is out of office currently.     Evaluation for : new dx endometrial cancer      Clinical History (per Nurse review of records provided):    5/15/24 Path:  Final Diagnosis   Endometrium, curretings:  - ENDOMETRIAL SEROUS CARCINOMA   5/15/24 Op Note: DILATION AND CURETTAGE, UTERUS, WITH ULTRASOUND GUIDANCE  -- BOOKMARKED  3/4/24 Pelvic US -- BOOKMARKED    Clinical Assessment / Barriers to Care (Per Nurse):  \"she lives in [facility] home with dementia. friend Katherine needs to be at all appointments (in demographics) \"    Records Location: Epic   Faxed - Media tab/Scanned     Records Needed:   Need images from 3/4/24 US please    Additional testing needed prior to consult:   N/A    Referral updates and Plan:   OUTGOING CALL to pt, no answer.     Sent [employer] asking for best point of contact for scheduling (nursing facility vs Katherine?)    Will follow-up pending input from pt      Sarah Nava, HONEYN, RN, PHN, OCN  Hematology/Oncology Nurse Navigator  [facility]  [phone]    "

## 2024-05-17 NOTE — PROGRESS NOTES
5-17-24   CC was notified of member same day admission for a procedure. CC will follow-up with facility staff to see how member is doing and follow-up as needed.   Natividad Thornton MA [facility] Care Coordinator   [phone] - work cell phone   [phone] - tmxz fax

## 2024-05-17 NOTE — TELEPHONE ENCOUNTER
Phone call to Elizabeth and informed of diagnosis. Called Katherine and also let her know, she had already seen the diagnosis. Sent her PCP info as well.    Referral placed for gyn onc and she may not decide to do any type of surgery, but they can also discuss possible medical options.     Samantha Portillo MD

## 2024-05-21 NOTE — TELEPHONE ENCOUNTER
RECORDS STATUS - ALL OTHER DIAGNOSIS      RECORDS RECEIVED FROM:    Appt Date: 5/23/2024    Endometrial cancer determined by uterine biopsy (H)     Action    Action Taken 5/21/2024 1:40pm ELLYN     I called [employer] Ph: [phone] - they will push the US image to  PACS    5/22/2024 8:55AM KEELENI   The US image is now in PACS      NOTES STATUS DETAILS   OFFICE NOTE from referring provider  Samantha Portillo MD    DISCHARGE SUMMARY from hospital Complete 5/15/2024 Pre-Op Exam - Postmenopausal Bleeding    DISCHARGE REPORT from the ER     MEDICATION LIST Complete [facility]   CLINICAL TRIAL TREATMENTS TO DATE     LABS     PATHOLOGY REPORTS Complete 5/15/2024   Endometrium, curretings:  - ENDOMETRIAL SEROUS CARCINOMA   ANYTHING RELATED TO DIAGNOSIS Complete Labs last updated on 5/15/2024    IMAGING (NEED IMAGES & REPORT)     ULTRASOUND Complete- [employer]  US Pelvis 3/4/2024    IMAGE DISC [employer] TRACKING   TRACKING #:

## 2024-05-22 NOTE — PROGRESS NOTES
FOLLOW-UP VISIT    Patient Name: Elizabeth Taylor      : 1940     Age: 77 year old        ______________________________________________________________________________     Chief Complaint   Patient presents with     Cancer     Follow up for Anal 5400 cGy completed on 3/23/18     BP 98/64       Pain  Denies    Labs  Other Labs: No    Imaging  None        Other Appointments:     MD Name: Dr Ray Appointment Date:    MD Name: Appointment Date:   MD Name: Appointment Date:   Other Appointment Notes:     Residual Radiation side effect: Pt is still at TCU, skin still healing    Additional Instructions:     Nurse face-to-face time: Level 3:  10 min face to face time         What Type Of Note Output Would You Prefer (Optional)?: Standard Output How Severe Is Your Skin Lesion?: mild Has Your Skin Lesion Been Treated?: not been treated Is This A New Presentation, Or A Follow-Up?: Skin Lesions Additional History: PCP has prescribed Clotrimazole for the spot on the lesion with no improvement.

## 2024-05-23 ENCOUNTER — PRE VISIT (OUTPATIENT)
Dept: ONCOLOGY | Facility: CLINIC | Age: 84
End: 2024-05-23
Payer: COMMERCIAL

## 2024-05-23 ENCOUNTER — ONCOLOGY VISIT (OUTPATIENT)
Dept: ONCOLOGY | Facility: CLINIC | Age: 84
End: 2024-05-23
Attending: OBSTETRICS & GYNECOLOGY
Payer: COMMERCIAL

## 2024-05-23 VITALS
RESPIRATION RATE: 14 BRPM | TEMPERATURE: 97.7 F | OXYGEN SATURATION: 95 % | DIASTOLIC BLOOD PRESSURE: 72 MMHG | HEART RATE: 82 BPM | SYSTOLIC BLOOD PRESSURE: 146 MMHG | WEIGHT: 112.3 LBS | BODY MASS INDEX: 19.28 KG/M2

## 2024-05-23 DIAGNOSIS — C54.1 ENDOMETRIAL CANCER DETERMINED BY UTERINE BIOPSY (H): ICD-10-CM

## 2024-05-23 DIAGNOSIS — Z74.09 LIMITED MOBILITY: Primary | ICD-10-CM

## 2024-05-23 PROCEDURE — G0463 HOSPITAL OUTPT CLINIC VISIT: HCPCS | Performed by: OBSTETRICS & GYNECOLOGY

## 2024-05-23 PROCEDURE — 99205 OFFICE O/P NEW HI 60 MIN: CPT | Performed by: OBSTETRICS & GYNECOLOGY

## 2024-05-23 ASSESSMENT — PAIN SCALES - GENERAL: PAINLEVEL: NO PAIN (0)

## 2024-05-23 NOTE — LETTER
2024         RE: Elizabeth Taylor  [facility]    [address]  [place] [address]        Dear Colleague,    Thank you for referring your patient, Elizabeth Taylor, to the [facility]. Please see a copy of my visit note below.    GYNECOLOGIC  ONCOLOGY CONSULT    Referring provider:    Samantha Portillo MD  [address] [place] [address]   RE: Elizabeth Taylor  : 1940  SALBADOR: 2024    CC:  Endometrial Serous Cancer    HPI: Ms Elizabeth Taylor is a 83 year old female who presents for consultation regarding new diagnosis of cancer of uterus.    She presented with post menopausal bleeding episode in 3/2024. Today she present  today with her sister and friend.    Today she reports liking her nursing home, she feels at home. She enjoys her activities, she has help for food and medications. Her goal is to stay comfortable at the home and be surrounded by friends and family when the time comes. She admires a friend who recently passed away in the home surrounded by her extended family.     Denies any vaginal bleeding, no pelvic pain, good appetite.     Family reports that her prior treatment for anal cancer was difficult and on few occasions she wanted to quit then. Does ot want to have poor quality of life for remaining time of her life.    Past medical history notable fot T2N1 IIIA anal squamous cell carcinoma.      Work up to date  In  presented with bright blood per rectum, 1.5 cm anal lesions and left groin adenopathy, PET confirmed left anal canal lesion and small left inguinal FDG avid lymph nodes  2018 to 3/2018 completed chemoradiation therapy with 5FU/Mitomycin  2018 hospitalized with SBO managed conservatively and required TPN  2018 traumatic right femoral neck fracture secondary to fall, underwent right hemiarthroplasty, diagnosed with C/ Diff infection     2018 fall and left hip pain with pelvic ring fracture, managed  conservatively    3/4/24 US Pelvis  Endometrial stripe 13 mm, stable 5 cm left ovarian simple cyst  5/15/24 D&C  Endometrial serous carcinoma: HER2 status 3+      Past Medical History:   Diagnosis Date     Anal cancer (H)      Endometriosis, site unspecified      History of malignant neoplasm of anus 7/26/2021     Periprosthetic fracture of right hip, subsequent encounter 7/26/2021     Thyroiditis, unspecified     Thryoiditis-resolved       Past Surgical History:   Procedure Laterality Date     APPENDECTOMY      as a child     ARTHROPLASTY HIP Right 7/26/2018    Procedure: ARTHROPLASTY HIP;  Right Hip Hemiarthroplasty;  Surgeon: Zaire Phan MD;  [facility]     COLONOSCOPY N/A 4/13/2017    Procedure: COLONOSCOPY;  Surgeon: Juan Dos Santos MD;  [facility]: [facility] GI     DILATION AND CURETTAGE, WITH ULTRASOUND GUIDANCE N/A 5/15/2024    Procedure: DILATION AND CURETTAGE, UTERUS, WITH ULTRASOUND GUIDANCE;  Surgeon: Samantha Portillo MD;  Location[facility] OR     INSERT PORT VASCULAR ACCESS Right 2/8/2018    Procedure: INSERT PORT VASCULAR ACCESS;  Place Single Lumen Venous Chest Port Placement;  Surgeon: Zaire Moreira PA-C;  [facility] OR     SURGICAL HISTORY OF -       endometriosis          Current Outpatient Medications   Medication Sig Dispense Refill     acetaminophen (TYLENOL) 325 MG tablet Take 3 tablets (975 mg) by mouth every 6 hours as needed for mild pain 50 tablet 0     acetaminophen (TYLENOL) 500 MG tablet Take 1,000 mg by mouth 3 times daily       Calcium Carbonate-Vitamin D 500-5 MG-MCG TABS Take 1 tablet by mouth 2 times daily       gabapentin (NEURONTIN) 100 MG capsule Take 100 mg by mouth 3 times daily       ibuprofen (ADVIL/MOTRIN) 600 MG tablet Take 1 tablet (600 mg) by mouth every 6 hours as needed for other (mild and/or inflammatory pain) 30 tablet 0     levothyroxine (SYNTHROID/LEVOTHROID) 50 MCG tablet Take 50 mcg by mouth daily       MAGNESIUM GLUCONATE PO  "Take 200 mg by mouth daily       mineral oil-hydrophilic petrolatum (AQUAPHOR) external ointment Apply topically 2 times daily (Apply to legs and back of neck)       Vitamin E 670 MG (1000 UT) CAPS Take 1 capsule by mouth daily           Allergies   Allergen Reactions     Penicillins Hives     As a child     Ketoconazole Rash     Propoxyphene Unknown     Darvocet    Had reaction in teen years       Social History:  Social History     Tobacco Use     Smoking status: Never     Smokeless tobacco: Never   Substance Use Topics     Alcohol use: No       Family History:   The patient's family history is notable for   Family History   Problem Relation Age of Onset     Cancer Sister      Cancer Maternal Grandmother         lymphoma     Heart Disease Father         MI     Uterine Cancer Niece          Physical Exam:     BP (!) 146/72 (BP Location: Right arm, Patient Position: Sitting, Cuff Size: Adult Regular)   Pulse 82   Temp 97.7  F (36.5  C) (Oral)   Resp 14   Wt 50.9 kg (112 lb 4.8 oz)   LMP  (LMP Unknown)   SpO2 95%   BMI 19.28 kg/m    Body mass index is 19.28 kg/m .    General: Alert and oriented, no acute distress,  uses a walker  Psych: Mood stable, repeats stories and has some confusion       Assessment: Elizabeth Taylor is a 83 year old woman with history of anal cancer treated with chemoradiation therapy in 2018. Now with serous endometrial cancer.    ECOG performance status 3    At this visit over a 45 minute of direct conversation, we discussed with patient, her sister and friend her wishes for care and treatment.  Discussed treatment options and serous cancer of uterus is considered high risk, treatment requires both surgical and systemic multidrug chemotherapy.     Recovery from surgery would be difficult for Elizabeth due to her current physical limitations.     Ultimately Elizabeth has decided that her wish was to be comfortable, she does not want surgery or chemotherapy. Maybe something that could \"zap\" the " tumor. I informed her she is not eligible for radiation therapy because of her prior pelvic radiation for anal cancer and radiation does not cure this kind of cancer.    She wants to stay at the current nursing home, receive comfort care and hospice care.    Family will inform nursing home    Okay to start Megace 40 mg BID which may help with vaginal bleeding    Follow up as needed      Kiarra Rawls M.D., MPH,  F.A.C.O.G.  Division of Gynecologic Oncology  [facility] [place]  [phone]      Time: total time spent today, 60 , including preparation, review of outside records, face to face counseling, and documentation.

## 2024-05-23 NOTE — PROGRESS NOTES
GYNECOLOGIC  ONCOLOGY CONSULT    Referring provider:    Samantha Portillo MD  [address] [place] [address]   RE: Elizabeth Taylor  : 1940  SAL[place]OR: 2024    CC:  Endometrial Serous Cancer    HPI: Ms Elizabeth Taylor is a 83 year old female who presents for consultation regarding new diagnosis of cancer of uterus.    She presented with post menopausal bleeding episode in 3/2024. Today she present  today with her sister and friend.    Today she reports liking her nursing home, she feels at home. She enjoys her activities, she has help for food and medications. Her goal is to stay comfortable at the home and be surrounded by friends and family when the time comes. She admires a friend who recently passed away in the home surrounded by her extended family.     Denies any vaginal bleeding, no pelvic pain, good appetite.     Family reports that her prior treatment for anal cancer was difficult and on few occasions she wanted to quit then. Does ot want to have poor quality of life for remaining time of her life.    Past medical history notable fot T2N1 IIIA anal squamous cell carcinoma.      Work up to date  In 2018 presented with bright blood per rectum, 1.5 cm anal lesions and left groin adenopathy, PET confirmed left anal canal lesion and small left inguinal FDG avid lymph nodes  2018 to 3/2018 completed chemoradiation therapy with 5FU/Mitomycin  2018 hospitalized with SBO managed conservatively and required TPN  2018 traumatic right femoral neck fracture secondary to fall, underwent right hemiarthroplasty, diagnosed with C/ Diff infection     2018 fall and left hip pain with pelvic ring fracture, managed conservatively    3/4/24 US Pelvis  Endometrial stripe 13 mm, stable 5 cm left ovarian simple cyst  5/15/24 D&C  Endometrial serous carcinoma: HER2 status 3+      Past Medical History:   Diagnosis Date    Anal cancer (H)     Endometriosis, site unspecified     History of malignant  neoplasm of anus 7/26/2021    Periprosthetic fracture of right hip, subsequent encounter 7/26/2021    Thyroiditis, unspecified     Thryoiditis-resolved       Past Surgical History:   Procedure Laterality Date    APPENDECTOMY      as a child    ARTHROPLASTY HIP Right 7/26/2018    Procedure: ARTHROPLASTY HIP;  Right Hip Hemiarthroplasty;  Surgeon: Zaire Phan MD;  [facility]    COLONOSCOPY N/A 4/13/2017    Procedure: COLONOSCOPY;  Surgeon: Juan Dos Santos MD;  [facility] GI    DILATION AND CURETTAGE, WITH ULTRASOUND GUIDANCE N/A 5/15/2024    Procedure: DILATION AND CURETTAGE, UTERUS, WITH ULTRASOUND GUIDANCE;  Surgeon: Samantha Portillo MD;  [facility]    INSERT PORT VASCULAR ACCESS Right 2/8/2018    Procedure: INSERT PORT VASCULAR ACCESS;  Place Single Lumen Venous Chest Port Placement;  Surgeon: Zaire Moreira PA-C;  [facility]    SURGICAL HISTORY OF -       endometriosis          Current Outpatient Medications   Medication Sig Dispense Refill    acetaminophen (TYLENOL) 325 MG tablet Take 3 tablets (975 mg) by mouth every 6 hours as needed for mild pain 50 tablet 0    acetaminophen (TYLENOL) 500 MG tablet Take 1,000 mg by mouth 3 times daily      Calcium Carbonate-Vitamin D 500-5 MG-MCG TABS Take 1 tablet by mouth 2 times daily      gabapentin (NEURONTIN) 100 MG capsule Take 100 mg by mouth 3 times daily      ibuprofen (ADVIL/MOTRIN) 600 MG tablet Take 1 tablet (600 mg) by mouth every 6 hours as needed for other (mild and/or inflammatory pain) 30 tablet 0    levothyroxine (SYNTHROID/LEVOTHROID) 50 MCG tablet Take 50 mcg by mouth daily      MAGNESIUM GLUCONATE PO Take 200 mg by mouth daily      mineral oil-hydrophilic petrolatum (AQUAPHOR) external ointment Apply topically 2 times daily (Apply to legs and back of neck)      Vitamin E 670 MG (1000 UT) CAPS Take 1 capsule by mouth daily           Allergies   Allergen Reactions    Penicillins Hives     As a child  "   Ketoconazole Rash    Propoxyphene Unknown     Darvocet    Had reaction in teen years       Social History:  Social History     Tobacco Use    Smoking status: Never    Smokeless tobacco: Never   Substance Use Topics    Alcohol use: No       Family History:   The patient's family history is notable for   Family History   Problem Relation Age of Onset    Cancer Sister     Cancer Maternal Grandmother         lymphoma    Heart Disease Father         MI    Uterine Cancer Niece          Physical Exam:     BP (!) 146/72 (BP Location: Right arm, Patient Position: Sitting, Cuff Size: Adult Regular)   Pulse 82   Temp 97.7  F (36.5  C) (Oral)   Resp 14   Wt 50.9 kg (112 lb 4.8 oz)   LMP  (LMP Unknown)   SpO2 95%   BMI 19.28 kg/m    Body mass index is 19.28 kg/m .    General: Alert and oriented, no acute distress,  uses a walker  Psych: Mood stable, repeats stories and has some confusion       Assessment: Elizabeth Taylor is a 83 year old woman with history of anal cancer treated with chemoradiation therapy in 2018. Now with serous endometrial cancer.    ECOG performance status 3    At this visit over a 45 minute of direct conversation, we discussed with patient, her sister and friend her wishes for care and treatment.  Discussed treatment options and serous cancer of uterus is considered high risk, treatment requires both surgical and systemic multidrug chemotherapy.     Recovery from surgery would be difficult for Elizabeth due to her current physical limitations.     Ultimately Elizabeth has decided that her wish was to be comfortable, she does not want surgery or chemotherapy. Maybe something that could \"zap\" the tumor. I informed her she is not eligible for radiation therapy because of her prior pelvic radiation for anal cancer and radiation does not cure this kind of cancer.    She wants to stay at the current nursing home, receive comfort care and hospice care.    Family will inform nursing home    Okay to start Megace 40 " mg BID which may help with vaginal bleeding    Follow up as needed      Kiarra Rawls M.D., MPH,  F.A.C.O.G.  Division of Gynecologic Oncology  [facility] [place]  [phone]      Time: total time spent today, 60 , including preparation, review of outside records, face to face counseling, and documentation.

## 2024-05-23 NOTE — NURSING NOTE
"Oncology Rooming Note    May 23, 2024 1:00 PM   Elizabeth Taylor is a 83 year old female who presents for:    No chief complaint on file.    Initial Vitals: BP (!) 146/72 (BP Location: Right arm, Patient Position: Sitting, Cuff Size: Adult Regular)   Pulse 82   Temp 97.7  F (36.5  C) (Oral)   Resp 14   Wt 50.9 kg (112 lb 4.8 oz)   LMP  (LMP Unknown)   SpO2 95%   BMI 19.28 kg/m   Estimated body mass index is 19.28 kg/m  as calculated from the following:    Height as of 5/15/24: 1.626 m (5' 4\").    Weight as of this encounter: 50.9 kg (112 lb 4.8 oz). Body surface area is 1.52 meters squared.  No Pain (0) Comment: Data Unavailable   No LMP recorded (lmp unknown). Patient is postmenopausal.  Allergies reviewed: Yes  Medications reviewed: Yes    Medications: Medication refills not needed today.  Pharmacy name entered into [facility]:    [facility] - [place], [place] - [address] MED[facility]  WRITTEN PRESCRIPTION REQUESTED  [place], [place] - [address]    Frailty Screening:   Is the patient here for a new oncology consult visit in cancer care? 2. No      Clinical concerns:  None      Dex Luo"

## 2024-05-28 ENCOUNTER — PATIENT OUTREACH (OUTPATIENT)
Dept: GERIATRIC MEDICINE | Facility: CLINIC | Age: 84
End: 2024-05-28
Payer: COMMERCIAL

## 2024-05-28 NOTE — PROGRESS NOTES
5-28-24  CC follow-up with SW at the SNF after member same day admission to hospital for post menopausal bleeding.  SW stated that member was doing well but has also signed up for HO at this time.    CC will follow-up as needed.   Natividad Thornton MA [facility] Care Coordinator   [phone] - szwc cell phone   [phone] - walq fax

## 2024-05-29 ENCOUNTER — DOCUMENTATION ONLY (OUTPATIENT)
Dept: OTHER | Facility: CLINIC | Age: 84
End: 2024-05-29
Payer: COMMERCIAL

## 2024-05-30 ENCOUNTER — ASSISTED LIVING VISIT (OUTPATIENT)
Dept: GERIATRICS | Facility: CLINIC | Age: 84
End: 2024-05-30
Payer: COMMERCIAL

## 2024-05-30 VITALS
HEIGHT: 64 IN | RESPIRATION RATE: 18 BRPM | HEART RATE: 68 BPM | SYSTOLIC BLOOD PRESSURE: 157 MMHG | TEMPERATURE: 96.9 F | OXYGEN SATURATION: 98 % | WEIGHT: 109.6 LBS | BODY MASS INDEX: 18.71 KG/M2 | DIASTOLIC BLOOD PRESSURE: 75 MMHG

## 2024-05-30 DIAGNOSIS — F02.B4 MODERATE LATE ONSET ALZHEIMER'S DEMENTIA WITH ANXIETY (H): Primary | ICD-10-CM

## 2024-05-30 DIAGNOSIS — E46 PROTEIN-CALORIE MALNUTRITION, UNSPECIFIED SEVERITY (H): ICD-10-CM

## 2024-05-30 DIAGNOSIS — N85.00 ENDOMETRIAL HYPERPLASIA: ICD-10-CM

## 2024-05-30 DIAGNOSIS — Z51.5 HOSPICE CARE PATIENT: ICD-10-CM

## 2024-05-30 DIAGNOSIS — G30.1 MODERATE LATE ONSET ALZHEIMER'S DEMENTIA WITH ANXIETY (H): Primary | ICD-10-CM

## 2024-05-30 DIAGNOSIS — N95.0 POSTMENOPAUSAL BLEEDING: ICD-10-CM

## 2024-05-30 DIAGNOSIS — C54.1 ENDOMETRIAL CARCINOMA (H): ICD-10-CM

## 2024-05-30 PROCEDURE — 99348 HOME/RES VST EST LOW MDM 30: CPT | Performed by: FAMILY MEDICINE

## 2024-05-30 NOTE — LETTER
5/30/2024        RE: Elizabeth Taylor  [facility]   [address]  [place] [address]        [facility] GERIATRIC SERVICES    Chief Complaint   Patient presents with     CHARLENE       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      Seen today for follow-up after recent procedures and evaluation.  I had previously seen her in March after an episode of vaginal bleeding.  Subsequent transvaginal ultrasound showed increased endometrial stripe.  She was referred to gynecology and due to stenosis, recommendation was to undergo exam under anesthesia with biopsy.  This was performed on May 15.  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  She did have visit with gynecologic oncology 5/23/2024.  Family was with her at this visit.  She was not having significant symptoms.  Given her frailty and difficulty with prior cancer treatment, plan was to focus on symptomatic management.  She was started on Megace 40 mg twice a day to assist with vaginal bleeding concerns.  Hospice was consulted and have seen her already.    Today, reviewed recent findings.  She has pretty poor insight and is able to recall having procedure done and being told she has cancer but not the specifics.  We reviewed this today.  However with her poor attention and memory, often does not discuss her diagnosis specifically.  She is continue to stay active enjoys doing regular exercise.  She has met with the hospice team and sounds as though is happy with their cares.  She is generally feeling well.  Has no abdominal or pelvic pain.  Does have chronic low back pain.  No dysuria or  "hematuria.  No recent vaginal bleeding she is aware of.  Appetite is doing well.    ALLERGIES: Penicillins, Ketoconazole, and Propoxyphene  Past Medical, Surgical, Family and Social History reviewed and updated in [facility].    Current Outpatient Medications   Medication Sig Dispense Refill     acetaminophen (TYLENOL) 325 MG tablet Take 3 tablets (975 mg) by mouth every 6 hours as needed for mild pain 50 tablet 0     acetaminophen (TYLENOL) 500 MG tablet Take 1,000 mg by mouth 3 times daily       Calcium Carbonate-Vitamin D 500-5 MG-MCG TABS Take 1 tablet by mouth 2 times daily (Patient not taking: Reported on 5/23/2024)       gabapentin (NEURONTIN) 100 MG capsule Take 100 mg by mouth 3 times daily       ibuprofen (ADVIL/MOTRIN) 600 MG tablet Take 1 tablet (600 mg) by mouth every 6 hours as needed for other (mild and/or inflammatory pain) 30 tablet 0     levothyroxine (SYNTHROID/LEVOTHROID) 50 MCG tablet Take 50 mcg by mouth daily       MAGNESIUM GLUCONATE PO Take 200 mg by mouth daily (Patient not taking: Reported on 5/23/2024)       mineral oil-hydrophilic petrolatum (AQUAPHOR) external ointment Apply topically 2 times daily (Apply to legs and back of neck)       Vitamin E 670 MG (1000 UT) CAPS Take 1 capsule by mouth daily (Patient not taking: Reported on 5/23/2024)       Physical Exam:  BP (!) 157/75   Pulse 68   Temp 96.9  F (36.1  C)   Resp 18   Ht 1.626 m (5' 4\")   Wt 49.7 kg (109 lb 9.6 oz)   LMP  (LMP Unknown)   SpO2 98%   BMI 18.81 kg/m      GENERAL APPEARANCE: Ambulating around her room, NAD  HENT:  Mild temporal atrophy, moderately Crow, poor dentition  PULM  Normal WOB on RA  ABDOMEN: Abdomen, soft, not tender, not distended  M/S:  Severe kyphosis  SKIN:  Moderate-severe bilateral LE venous staisis changes  NEURO: Alert, circuitous thought processes, easily distracted.CN II-XII grossly intact, Ambulates with 4ww  PSYCH: Mood normal with bright affect    Recent Labs:     Final Diagnosis   Endometrium, " curretings:  - ENDOMETRIAL SEROUS CARCINOMA       Assessment/Plan:      ICD-10-CM    1. Moderate late onset Alzheimer's dementia with anxiety (H)  G30.1     F02.B4       2. Protein-calorie malnutrition, unspecified severity (H24)  E46       3. Postmenopausal bleeding  N95.0       4. Endometrial hyperplasia  N85.00       5. Endometrial carcinoma (H)  C54.1       6. Hospice care patient  Z51.5         Seen today for follow-up of recent evaluations and diagnosis of serous endometrial carcinoma.  She has met with oncology and plans to focus on symptomatic management and hospice has met with her previously.  Currently she is having no significant symptoms.  Quality of life is good here at the facility.  Her insight into her diagnosis is limited by her dementia, but has good support of friends and family.  No changes needed at this time.  Will continue to monitor her course and adjust as necessary.    Electronically signed by    Benjamin Rosenstein, MD, MA  [facility]     This note was completed with the assistance of dictation software. Typos and word substitution-errors are expected and unintended.      34 MINUTES SPENT BY ME on the date of service doing chart review, history, exam, documentation & further activities per the note.      Sincerely,        Benjamin Rosenstein, MD

## 2024-05-30 NOTE — PROGRESS NOTES
[facility] GERIATRIC SERVICES    Chief Complaint   Patient presents with    CHARLENE       [facility] Medical Record Number:  [id number]  Place of Service where encounter took place:  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      Seen today for follow-up after recent procedures and evaluation.  I had previously seen her in March after an episode of vaginal bleeding.  Subsequent transvaginal ultrasound showed increased endometrial stripe.  She was referred to gynecology and due to stenosis, recommendation was to undergo exam under anesthesia with biopsy.  This was performed on May 15.  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  She did have visit with gynecologic oncology 5/23/2024.  Family was with her at this visit.  She was not having significant symptoms.  Given her frailty and difficulty with prior cancer treatment, plan was to focus on symptomatic management.  She was started on Megace 40 mg twice a day to assist with vaginal bleeding concerns.  Hospice was consulted and have seen her already.    Today, reviewed recent findings.  She has pretty poor insight and is able to recall having procedure done and being told she has cancer but not the specifics.  We reviewed this today.  However with her poor attention and memory, often does not discuss her diagnosis specifically.  She is continue to stay active enjoys doing regular exercise.  She has met with the hospice team and sounds as though is happy with their cares.  She is generally feeling well.  Has no abdominal or pelvic pain.  Does have chronic low back pain.  No dysuria or hematuria.  No recent vaginal bleeding she is aware of.  Appetite is doing well.    ALLERGIES: Penicillins,  "Ketoconazole, and Propoxyphene  Past Medical, Surgical, Family and Social History reviewed and updated in [facility].    Current Outpatient Medications   Medication Sig Dispense Refill    acetaminophen (TYLENOL) 325 MG tablet Take 3 tablets (975 mg) by mouth every 6 hours as needed for mild pain 50 tablet 0    acetaminophen (TYLENOL) 500 MG tablet Take 1,000 mg by mouth 3 times daily      Calcium Carbonate-Vitamin D 500-5 MG-MCG TABS Take 1 tablet by mouth 2 times daily (Patient not taking: Reported on 5/23/2024)      gabapentin (NEURONTIN) 100 MG capsule Take 100 mg by mouth 3 times daily      ibuprofen (ADVIL/MOTRIN) 600 MG tablet Take 1 tablet (600 mg) by mouth every 6 hours as needed for other (mild and/or inflammatory pain) 30 tablet 0    levothyroxine (SYNTHROID/LEVOTHROID) 50 MCG tablet Take 50 mcg by mouth daily      MAGNESIUM GLUCONATE PO Take 200 mg by mouth daily (Patient not taking: Reported on 5/23/2024)      mineral oil-hydrophilic petrolatum (AQUAPHOR) external ointment Apply topically 2 times daily (Apply to legs and back of neck)      Vitamin E 670 MG (1000 UT) CAPS Take 1 capsule by mouth daily (Patient not taking: Reported on 5/23/2024)       Physical Exam:  BP (!) 157/75   Pulse 68   Temp 96.9  F (36.1  C)   Resp 18   Ht 1.626 m (5' 4\")   Wt 49.7 kg (109 lb 9.6 oz)   LMP  (LMP Unknown)   SpO2 98%   BMI 18.81 kg/m      GENERAL APPEARANCE: Ambulating around her room, NAD  HENT:  Mild temporal atrophy, moderately [place], poor dentition  PULM  Normal WOB on RA  ABDOMEN: Abdomen, soft, not tender, not distended  M/S:  Severe kyphosis  SKIN:  Moderate-severe bilateral LE venous staisis changes  NEURO: Alert, circuitous thought processes, easily distracted.CN II-XII grossly intact, Ambulates with 4ww  PSYCH: Mood normal with bright affect    Recent Labs:     Final Diagnosis   Endometrium, curretings:  - ENDOMETRIAL SEROUS CARCINOMA       Assessment/Plan:      ICD-10-CM    1. Moderate late onset " Alzheimer's dementia with anxiety (H)  G30.1     F02.B4       2. Protein-calorie malnutrition, unspecified severity (H24)  E46       3. Postmenopausal bleeding  N95.0       4. Endometrial hyperplasia  N85.00       5. Endometrial carcinoma (H)  C54.1       6. Hospice care patient  Z51.5         Seen today for follow-up of recent evaluations and diagnosis of serous endometrial carcinoma.  She has met with oncology and plans to focus on symptomatic management and hospice has met with her previously.  Currently she is having no significant symptoms.  Quality of life is good here at the facility.  Her insight into her diagnosis is limited by her dementia, but has good support of friends and family.  No changes needed at this time.  Will continue to monitor her course and adjust as necessary.    Electronically signed by    Benjamin Rosenstein, MD, MA  [facility] Faculty     This note was completed with the assistance of dictation software. Typos and word substitution-errors are expected and unintended.      34 MINUTES SPENT BY ME on the date of service doing chart review, history, exam, documentation & further activities per the note.

## 2024-06-08 ENCOUNTER — HEALTH MAINTENANCE LETTER (OUTPATIENT)
Age: 84
End: 2024-06-08

## 2024-06-20 ENCOUNTER — PATIENT OUTREACH (OUTPATIENT)
Dept: GERIATRIC MEDICINE | Facility: CLINIC | Age: 84
End: 2024-06-20
Payer: COMMERCIAL

## 2024-07-12 NOTE — PROGRESS NOTES
[facility] Care Coordination Contact  [facility] Institutional Assessment     Institutional Assessment for Health Risk Assessment with Elizabeth Taylor completed on June 20, 2024 at [facility]    Type of residence:: Nursing home ([facility])  Current living arrangement:: I live in a nursing home     Assessment completed with:: Patient, Care Team Member    Mental/Behavioral Health and depression Screening are managed by SNF and no concerns at this time.       Mental health DX:: Yes   Mental health DX how managed:: Medication    Falls Assessment:   Fallen 2 or more times in the past year?: No   Any fall with injury in the past year?: No    ADL/IADL Dependencies:   Dependent ADLs:: Bathing, Dressing, Grooming, Incontinence, Positioning, Transfers, Wheelchair-with assist, Toileting  Dependent IADLs:: Cleaning, Cooking, Laundry, Shopping, Meal Preparation, Medication Management, Money Management, Transportation, Incontinence      Care Plan & Recommendations: Member was very tired at visit and did not interact much with CC.  She was laying in her bed as she always has with her feet on the floor and seemed to be comfortable at this time.    CC did confirm that member is no received HO services and supports as well at the facility.    Discussed options/opportunities for transitions.    See Institutional Care Plan for detailed assessment information.    Obtained a copy of the facility care plan and MDS from facility electronic records. Requested of long-term social worker to put this care coordinator on care conference attendee list.    Placed the Health Plan facility face sheet in the member's facility chart.    Follow-Up Plan: Member informed of future contact, plan to f/u with member with a 6 month assessment, attend 1 care conference annually, and will follow any hospitalizations or transitions. Care Coordinator contact information shared with member/family and facility, and  encouraged to call this care coordinator with any questions or concerns at any time.     [place] care continuum providers: Please see Snapshot and Care Management Flowsheets for Specific details of care plan.    This CC note routed to PCP, Jada Martinez MA [facility] Care Coordinator   [phone] - fitl cell phone   [phone] - work fax

## 2024-07-15 ENCOUNTER — PATIENT OUTREACH (OUTPATIENT)
Dept: GERIATRIC MEDICINE | Facility: CLINIC | Age: 84
End: 2024-07-15
Payer: COMMERCIAL

## 2024-07-15 NOTE — LETTER
July 15, 2024    Important [employer] Information    GERHARD SU  [facility]   [address]   [place] [address]                                                                              Your Care Plan      Dear Gerhard,    I am your [employer] Care Coordinator and I visited you at [facility]  on 6/20/2024 to complete your [employer] [facility] Assessment.    [x] I reviewed your Facility Care Plan and assessment and all identified needs have goals present    [] I reviewed your Facility Care Plan and assessment and we identified these additional goal(s):                                                                                                                                                                                                                                                   I will plan to follow up:  [] Once a month  [] Every 3 months   [x] Every 6 months  [] Other      Questions?  If you have any questions or want to discuss your health care needs, call me at [phone] Monday-Friday between 8am and 5pm. TTY: [phone].     Sincerely,    Natividad Thornton MA, LSW    E-mail: [email or web address]  Phone: [phone]      [facility]      cc: member record, Skilled Nursing Facility    [employer]  is an O D-SNP that contracts with both Medicare and the [place] Medical Assistance (Medicaid) program to provide benefits of both programs to enrollees. Enrollment in [employer] depends on contract renewal.  [id number]    2023 [employer]

## 2024-07-15 NOTE — PROGRESS NOTES
[facility] Care Coordination Contact    Received after visit chart from care coordinator.  Completed following tasks: Mailed [employer] Post Visit letter to member/rep and NH facility.    Felisha Guo  Care Management Specialist   [facility]   [phone]

## 2024-07-25 ENCOUNTER — ASSISTED LIVING VISIT (OUTPATIENT)
Dept: GERIATRICS | Facility: CLINIC | Age: 84
End: 2024-07-25
Payer: OTHER MISCELLANEOUS

## 2024-07-25 VITALS
SYSTOLIC BLOOD PRESSURE: 119 MMHG | DIASTOLIC BLOOD PRESSURE: 76 MMHG | WEIGHT: 109.2 LBS | BODY MASS INDEX: 18.64 KG/M2 | HEART RATE: 78 BPM | RESPIRATION RATE: 18 BRPM | HEIGHT: 64 IN | OXYGEN SATURATION: 96 % | TEMPERATURE: 98.1 F

## 2024-07-25 DIAGNOSIS — E03.9 HYPOTHYROIDISM, UNSPECIFIED TYPE: ICD-10-CM

## 2024-07-25 DIAGNOSIS — Z51.5 HOSPICE CARE PATIENT: Primary | ICD-10-CM

## 2024-07-25 DIAGNOSIS — G30.1 MODERATE LATE ONSET ALZHEIMER'S DEMENTIA WITH ANXIETY (H): ICD-10-CM

## 2024-07-25 DIAGNOSIS — R35.0 URINARY FREQUENCY: ICD-10-CM

## 2024-07-25 DIAGNOSIS — R52 PAIN: ICD-10-CM

## 2024-07-25 DIAGNOSIS — M80.00XD AGE-RELATED OSTEOPOROSIS WITH CURRENT PATHOLOGICAL FRACTURE WITH ROUTINE HEALING, SUBSEQUENT ENCOUNTER: ICD-10-CM

## 2024-07-25 DIAGNOSIS — F02.B4 MODERATE LATE ONSET ALZHEIMER'S DEMENTIA WITH ANXIETY (H): ICD-10-CM

## 2024-07-25 DIAGNOSIS — N85.00 ENDOMETRIAL HYPERPLASIA: ICD-10-CM

## 2024-07-25 DIAGNOSIS — C54.1 ENDOMETRIAL CARCINOMA (H): ICD-10-CM

## 2024-07-25 DIAGNOSIS — E46 PROTEIN-CALORIE MALNUTRITION, UNSPECIFIED SEVERITY (H): ICD-10-CM

## 2024-07-25 DIAGNOSIS — N95.0 POSTMENOPAUSAL BLEEDING: ICD-10-CM

## 2024-07-25 PROCEDURE — 99349 HOME/RES VST EST MOD MDM 40: CPT

## 2024-07-25 NOTE — PROGRESS NOTES
"[facility] GERIATRICS    Chief Complaint   Patient presents with    Assisted Living Regulatory         HPI: Elizabeth Taylor is a 83 year old (1940), who is being seen today for an episodic care visit at: [facility] ([facility]) [[id number]]. HPI information obtained from: facility chart records, facility staff, patient report and [facility] chart review. PMH:  MCI, osteoporosis, anal cancer, hypothyroidism, and frequent falls. She has been a resident at this facility since 07/2021. Recently diagnosed with endometrial carcinoma and signed on to hospice in May 2024.     Today:  No concerns reported by nursing staff.     Ms. Taylor is seen today for a visit in the community dining area; she prefers to sit here instead of her room; \"I'll go back there later\". Her friend, Katherine, came to visit and had left. Regarding how patient is feeling, notes, \"I'm trying to figure it out but I feel good\". She does not remember meeting writer in the past. Denies pain; which makes her feel \"so delighted\"; \"I like to walk\".  Appetite is baseline. Bowels are moving \"everyday\"; \"3 times that I remember\". She is not a good sleeper; \"I don't sleep long hours\"; during times she cannot go back to sleep, she takes a walk around the unit. Denies CP, palpitations, lightheadedness, dizziness, fatigue, SOB, fever, chills, nausea/vomiting concerns today.Main concern is her bladder; notes urinary frequency; \"every 30 minutes... 2 hours.. I have to change myself\". Wears incontinence briefs. Writer discussed common foods that are bladder irritants that may be contributing to this (I.e. citric foods, processed foods, milk, caffeine, etc.). Patient endorses drinking up to 3 glasses of orange juice in the morning and 1-2 cups of coffee. Writer discussed that diet may be a possible factor and recommended decreasing intake of common bladder irritants or increasing water intake but patient noted \"I don't like water\". Patient was receptive to " "trial pharmaceutical intervention instead. She denies any other bladder concerns other than frequency.      Allergies, and PMH/PSH reviewed in [facility] today.  REVIEW OF SYSTEMS:  ROS: 10 point ROS neg other than the symptoms noted above in the HPI.      Objective:   /76   Pulse 78   Temp 98.1  F (36.7  C)   Resp 18   Ht 1.626 m (5' 4\")   Wt 49.5 kg (109 lb 3.2 oz)   LMP  (LMP Unknown)   SpO2 96%   BMI 18.74 kg/m    GENERAL APPEARANCE:  Alert, elderly, in no distress, sitting in chair  HEENT:  atraumatic, [place], EOM intact, moist mucus membranes  RESP:  non-labored breathing, lungs clear on auscultation, no respiratory distress, no cough  CV:  Rate regular, S1 S2 noted, no edema  ABDOMEN:  soft, non-distended, non-tender, bowel sounds active  M/S:  uses wheelchair as walker, kyphosis, moves all extremities, strength and tone equal bilaterally, no calf pain, arthritic changes noted in hands  SKIN:  warm, dry, thin, fragile, no obvious rash, lesions, ulcerations or petechiae   NEURO:   Face is symmetric, examination of sensation by touch normal, follows and tracks, slow speech, recall difficulty  PSYCH:  calm, cooperative        Labs reviewed as per [facility] and/or Care Everywhere.      Assessment/Plan:     Hospice care patient   Endometrial carcinoma (H)   Endometrial hyperplasia   Postmenopausal bleeding   Postmenopausal bleeding episode and 3/2024 prompted further evaluation resulting in new diagnosis of cancer of uterus. Met with oncology in May; noted recovery from surgery will be difficult due to current physical limitations. Started on Megace to help with vaginal bleeding.   -Continue Comfort focused care  -Continue Megace 40 mg twice daily    Urinary frequency   Chronic. Patient requests for pharmaceutical intervention.  -start mybetriq 25 mg daily  -monitor effectiveness; discontinue if no relief    Moderate late onset Alzheimer's dementia with anxiety (H)  Anxiety  Progressive. Poor memory. Becomes " anxious when she is unable to recall information. Resides in [facility] for supportive cares.  -continue 24/7 nursing and supportive cares  -monitor mood and behaviors    Age-related osteoporosis with current pathological fracture with routine healing, subsequent encounter  Pain  Hx of left proximal humerus fx and left distal radius fx (diagnosed in 9/23). Per chart review, followed up with [facility]o on 1/4/24; notes fractures have healed well, residual pain and decreased motion may be secondary to shoulder osteoarthritis. No pain today.  -continue to WBAT and activities as tolerated  -follow-up with ortho as needed  -continue calcium carbonate vit D 500-5 mg-mcg BID  -continue APAP 1000 mg TID  -continue gabapentin 100 mg TID  -monitor effectiveness    Hypothyroidism, unspecified type  Last TSH 5.68 (11/17/23).  -continue levothyroxine 50 mcg daily  -trend labs periodically per patient goals of care    Kyphoscoliosis  Chronic.  -continue lumbar brace for support    Protein-calorie malnutrition, unspecified severity (H24)  Weight stable at 109#.  -continue 4 ounce nutritional shake daily  -encourage healthy food intake  -follow weights        MED REC REQUIRED  Post Medication Reconciliation Status: patient was not discharged from an inpatient facility or TCU      45 minutes spent on the date of this encounter doing chart review, review labs, discussion with nursing staff, patient visit, and documentation.       Electronically signed by: Dr. Jada Martinez, DNP, APRN, AGNP-BC, PHN    This note was completed with the assistance of dictation software. Typos and word substitution-errors are expected and unintended.

## 2024-07-25 NOTE — LETTER
" 7/25/2024      Elizabeth Taylor  [facility]   [address]  [place] [address]        [facility] GERIATRICS    Chief Complaint   Patient presents with     Assisted Living Regulatory         HPI: Elizabeth Taylor is a 83 year old (1940), who is being seen today for an episodic care visit at: [facility] ([facility]) [[id number]]. HPI information obtained from: facility chart records, facility staff, patient report and [facility] chart review. PMH:  MCI, osteoporosis, anal cancer, hypothyroidism, and frequent falls. She has been a resident at this facility since 07/2021. Recently diagnosed with endometrial carcinoma and signed on to hospice in May 2024.     Today:  No concerns reported by nursing staff.     Ms. Taylor is seen today for a visit in the community dining area; she prefers to sit here instead of her room; \"I'll go back there later\". Her friend, Katherine, came to visit and had left. Regarding how patient is feeling, notes, \"I'm trying to figure it out but I feel good\". She does not remember meeting writer in the past. Denies pain; which makes her feel \"so delighted\"; \"I like to walk\".  Appetite is baseline. Bowels are moving \"everyday\"; \"3 times that I remember\". She is not a good sleeper; \"I don't sleep long hours\"; during times she cannot go back to sleep, she takes a walk around the unit. Denies CP, palpitations, lightheadedness, dizziness, fatigue, SOB, fever, chills, nausea/vomiting concerns today.Main concern is her bladder; notes urinary frequency; \"every 30 minutes... 2 hours.. I have to change myself\". Wears incontinence briefs. Writer discussed common foods that are bladder irritants that may be contributing to this (I.e. citric foods, processed foods, milk, caffeine, etc.). Patient endorses drinking up to 3 glasses of orange juice in the morning and 1-2 cups of coffee. Writer discussed that diet may be a possible factor and recommended decreasing intake of common bladder " "irritants or increasing water intake but patient noted \"I don't like water\". Patient was receptive to trial pharmaceutical intervention instead. She denies any other bladder concerns other than frequency.      Allergies, and PMH/PSH reviewed in [facility] today.  REVIEW OF SYSTEMS:  ROS: 10 point ROS neg other than the symptoms noted above in the HPI.      Objective:   /76   Pulse 78   Temp 98.1  F (36.7  C)   Resp 18   Ht 1.626 m (5' 4\")   Wt 49.5 kg (109 lb 3.2 oz)   LMP  (LMP Unknown)   SpO2 96%   BMI 18.74 kg/m    GENERAL APPEARANCE:  Alert, elderly, in no distress, sitting in chair  HEENT:  atraumatic, [place], EOM intact, moist mucus membranes  RESP:  non-labored breathing, lungs clear on auscultation, no respiratory distress, no cough  CV:  Rate regular, S1 S2 noted, no edema  ABDOMEN:  soft, non-distended, non-tender, bowel sounds active  M/S:  uses wheelchair as walker, kyphosis, moves all extremities, strength and tone equal bilaterally, no calf pain, arthritic changes noted in hands  SKIN:  warm, dry, thin, fragile, no obvious rash, lesions, ulcerations or petechiae   NEURO:   Face is symmetric, examination of sensation by touch normal, follows and tracks, slow speech, recall difficulty  PSYCH:  calm, cooperative        Labs reviewed as per [facility] and/or Care Everywhere.      Assessment/Plan:     Hospice care patient   Endometrial carcinoma (H)   Endometrial hyperplasia   Postmenopausal bleeding   Postmenopausal bleeding episode and 3/2024 prompted further evaluation resulting in new diagnosis of cancer of uterus. Met with oncology in May; noted recovery from surgery will be difficult due to current physical limitations. Started on Megace to help with vaginal bleeding.   -Continue Comfort focused care  -Continue Megace 40 mg twice daily    Urinary frequency   Chronic. Patient requests for pharmaceutical intervention.  -start mybetriq 25 mg daily  -monitor effectiveness; discontinue if no " relief    Moderate late onset Alzheimer's dementia with anxiety (H)  Anxiety  Progressive. Poor memory. Becomes anxious when she is unable to recall information. Resides in [facility] for supportive cares.  -continue 24/7 nursing and supportive cares  -monitor mood and behaviors    Age-related osteoporosis with current pathological fracture with routine healing, subsequent encounter  Pain  Hx of left proximal humerus fx and left distal radius fx (diagnosed in 9/23). Per chart review, followed up with [facility]o on 1/4/24; notes fractures have healed well, residual pain and decreased motion may be secondary to shoulder osteoarthritis. No pain today.  -continue to WBAT and activities as tolerated  -follow-up with ortho as needed  -continue calcium carbonate vit D 500-5 mg-mcg BID  -continue APAP 1000 mg TID  -continue gabapentin 100 mg TID  -monitor effectiveness    Hypothyroidism, unspecified type  Last TSH 5.68 (11/17/23).  -continue levothyroxine 50 mcg daily  -trend labs periodically per patient goals of care    Kyphoscoliosis  Chronic.  -continue lumbar brace for support    Protein-calorie malnutrition, unspecified severity (H24)  Weight stable at 109#.  -continue 4 ounce nutritional shake daily  -encourage healthy food intake  -follow weights        MED REC REQUIRED  Post Medication Reconciliation Status: patient was not discharged from an inpatient facility or TCU      45 minutes spent on the date of this encounter doing chart review, review labs, discussion with nursing staff, patient visit, and documentation.       Electronically signed by: Dr. Jada Martinez, DNP, APRN, AGNP-BC, PHN    This note was completed with the assistance of dictation software. Typos and word substitution-errors are expected and unintended.        Sincerely,        Jada Martinez, GRAHAM CNP

## 2024-07-27 RX ORDER — MAGNESIUM GLYCINATE 100 MG
200 CAPSULE ORAL DAILY
COMMUNITY

## 2024-10-31 ENCOUNTER — ASSISTED LIVING VISIT (OUTPATIENT)
Dept: GERIATRICS | Facility: CLINIC | Age: 84
End: 2024-10-31
Payer: OTHER MISCELLANEOUS

## 2024-10-31 VITALS
SYSTOLIC BLOOD PRESSURE: 113 MMHG | TEMPERATURE: 98.2 F | DIASTOLIC BLOOD PRESSURE: 76 MMHG | HEART RATE: 67 BPM | BODY MASS INDEX: 18.44 KG/M2 | WEIGHT: 108 LBS | RESPIRATION RATE: 18 BRPM | HEIGHT: 64 IN | OXYGEN SATURATION: 98 %

## 2024-10-31 DIAGNOSIS — R26.89 IMPAIRED GAIT AND MOBILITY: ICD-10-CM

## 2024-10-31 DIAGNOSIS — Z51.5 HOSPICE CARE PATIENT: Primary | ICD-10-CM

## 2024-10-31 DIAGNOSIS — R29.6 FALLS FREQUENTLY: ICD-10-CM

## 2024-10-31 DIAGNOSIS — N95.0 POSTMENOPAUSAL BLEEDING: ICD-10-CM

## 2024-10-31 DIAGNOSIS — F02.B4 MODERATE LATE ONSET ALZHEIMER'S DEMENTIA WITH ANXIETY (H): ICD-10-CM

## 2024-10-31 DIAGNOSIS — M41.9 KYPHOSCOLIOSIS: ICD-10-CM

## 2024-10-31 DIAGNOSIS — G30.1 MODERATE LATE ONSET ALZHEIMER'S DEMENTIA WITH ANXIETY (H): ICD-10-CM

## 2024-10-31 DIAGNOSIS — C54.1 ENDOMETRIAL CARCINOMA (H): ICD-10-CM

## 2024-10-31 DIAGNOSIS — R39.81 FUNCTIONAL URINARY INCONTINENCE: ICD-10-CM

## 2024-10-31 DIAGNOSIS — N39.46 URINARY INCONTINENCE, MIXED: ICD-10-CM

## 2024-10-31 PROCEDURE — 99349 HOME/RES VST EST MOD MDM 40: CPT | Mod: GV | Performed by: FAMILY MEDICINE

## 2024-10-31 NOTE — LETTER
10/31/2024      Elizabeth Taylor  [facility]   [address]  [place] [address]        [facility] GERIATRICS    Chief Complaint   Patient presents with     RECHECK     HPI:  Elizabeth Taylor is a 84 year old  (1940), former ballet  and teacher, with pmhx including dementia, osteoporosis, frequent falls, anal cancer,  and recently diagnosed with endometrial cancer 5/2024 now followed with hospice who is being seen today for an episodic care visit at: [facility] ([facility]) [[id number]].     Seen today in her room.  Generally doing well without concerns.  Sounds as though was not feeling as well over the weekend but doing better today.  She was not able to specify exactly what her issue was.  She does continue to have urinary incontinence and seemed to related to that.  She wears briefs regularly.  She cannot sense her need to urinate and does not have a urgency.  She has had no vaginal bleeding that she recalls her last few weeks.  Her pain is generally well-controlled but does occasionally experience spasms in her leg.  She does feel weak and notes she has difficulty ambulating to the bathroom in time to urinate as well.    Recent staff notes reviewed.  Notably multiple recent falls, most recently 10/25/2024 in which she slid off the bed but sounds as though she lowered herself to the floor.  Additionally 10/13/2024 it sounds as though she hit her head against the wall and then decided to lay down.  Additionally recently increased anxiety and so hospice started as needed lorazepam with improvement.  Also started on Celexa which is currently being titrated up.    Notably my colleague previously ordered Myrbetriq but does not appear it was ever started.  Hospice had ordered oxybutynin 3 times daily as needed.  She has used this once since it was ordered.    Additionally concerns of falls being related to excess items in her room.  Notably her room is much more picked up today  "than it has been in the past.    Objective:   /76   Pulse 67   Temp 98.2  F (36.8  C)   Resp 18   Ht 1.626 m (5' 4\")   Wt 49 kg (108 lb)   LMP  (LMP Unknown)   SpO2 98%   BMI 18.54 kg/m      GENERAL APPEARANCE: Seated comfortably, NAD  HENT:  NCAT, severely [place], poor dentition  PULM  Normal WOB on RA, lungs CTAB, no wheezes or crackles  CV:  RRR with few skipped beaths (PACs?), S1/S2 normal, bilatera LE edema  ABDOMEN: Abdomen soft, not tender, not distended, BS normal and active throughout   M/S:   Severe kyphosis  SKIN:  Severe venous stasis changes of bilateral LEs  NEURO: Alert, answering questions appropriately though with tangential thought processes  PSYCH: Mood normal with congruent affect; insight impaired    Assessment/Plan:    (Z51.5) Hospice care patient  (primary encounter diagnosis)  Comment: Related to the below.  Recently started on lorazepam to assist with anxiety.  Continues to have regular visits with chaplaincy as well.    (C54.1) Endometrial carcinoma (H)  (N95.0) Postmenopausal bleeding  Comment: Diagnosed May of this past year.  She did not wish to pursue invasive treatments, reasonable given age and function.  Continues to follow with hospice.    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  Continues to do well in supportive environment.  Overall insight into her care is impaired but she has a close friend who assists with decisions.  -Consider TSH next visit    (M41.9) Kyphoscoliosis  Comment: Significant history of severe osteoporosis.  Have previously discussed treatments which she did not wish to pursue.  Does ambulate with a four-wheel walker regularly without concerns.    (N39.46) Urinary incontinence, mixed  (R39.81) Functional urinary incontinence  Comment: Mixed overflow and functional urinary " incontinence.  Colleague previously ordered Myrbetriq but appears was not started.  Unclear, possibly not covered by hospice.  If this is the case, would alternatively use less CNS active anticholinergic agents such as trospium.    (R29.6) Falls frequently  (R26.89) Impaired gait and mobility  Comment: Multifactorial related to cognition, chronic deconditioning, impaired mobility and environmental awareness particularly with kyphoscoliosis, effects of cancer, possible medication effects.  Plan:   -Discontinue oxybutynin    MED REC REQUIRED  Post Medication Reconciliation Status: medication reconcilation previously completed during another office visit      Orders:  [x] Dc vitamin E  [x] Discontinue oxybutynin, start myrbetriq daily    Electronically signed by:     Benjamin Rosenstein, MD, MA  [facility]     This note was completed with the assistance of dictation software. Typos and word substitution-errors are expected and unintended.      41 MINUTES SPENT BY ME on the date of service doing chart review, history, exam, documentation & further activities per the note.        Sincerely,        Benjamin Rosenstein, MD

## 2024-10-31 NOTE — PROGRESS NOTES
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     Seen today in her room.  Generally doing well without concerns.  Sounds as though was not feeling as well over the weekend but doing better today.  She was not able to specify exactly what her issue was.  She does continue to have urinary incontinence and seemed to related to that.  She wears briefs regularly.  She cannot sense her need to urinate and does not have a urgency.  She has had no vaginal bleeding that she recalls her last few weeks.  Her pain is generally well-controlled but does occasionally experience spasms in her leg.  She does feel weak and notes she has difficulty ambulating to the bathroom in time to urinate as well.    Recent staff notes reviewed.  Notably multiple recent falls, most recently 10/25/2024 in which she slid off the bed but sounds as though she lowered herself to the floor.  Additionally 10/13/2024 it sounds as though she hit her head against the wall and then decided to lay down.  Additionally recently increased anxiety and so hospice started as needed lorazepam with improvement.  Also started on Celexa which is currently being titrated up.    Notably my colleague previously ordered Myrbetriq but does not appear it was ever started.  Hospice had ordered oxybutynin 3 times daily as needed.  She has used this once since it was ordered.    Additionally concerns of falls being related to excess items in her room.  Notably her room is much more picked up today than it has been in the past.    Objective:   /76   Pulse 67   Temp 98.2  F (36.8  C)   Resp 18    "Ht 1.626 m (5' 4\")   Wt 49 kg (108 lb)   LMP  (LMP Unknown)   SpO2 98%   BMI 18.54 kg/m      GENERAL APPEARANCE: Seated comfortably, NAD  HENT:  NCAT, severely [place], poor dentition  PULM  Normal WOB on RA, lungs CTAB, no wheezes or crackles  CV:  RRR with few skipped beaths (PACs?), S1/S2 normal, bilatera LE edema  ABDOMEN: Abdomen soft, not tender, not distended, BS normal and active throughout   M/S:   Severe kyphosis  SKIN:  Severe venous stasis changes of bilateral LEs  NEURO: Alert, answering questions appropriately though with tangential thought processes  PSYCH: Mood normal with congruent affect; insight impaired    Assessment/Plan:    (Z51.5) Hospice care patient  (primary encounter diagnosis)  Comment: Related to the below.  Recently started on lorazepam to assist with anxiety.  Continues to have regular visits with chaplaincy as well.    (C54.1) Endometrial carcinoma (H)  (N95.0) Postmenopausal bleeding  Comment: Diagnosed May of this past year.  She did not wish to pursue invasive treatments, reasonable given age and function.  Continues to follow with hospice.    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  Continues to do well in supportive environment.  Overall insight into her care is impaired but she has a close friend who assists with decisions.  -Consider TSH next visit    (M41.9) Kyphoscoliosis  Comment: Significant history of severe osteoporosis.  Have previously discussed treatments which she did not wish to pursue.  Does ambulate with a four-wheel walker regularly without concerns.    (N39.46) Urinary incontinence, mixed  (R39.81) Functional urinary incontinence  Comment: Mixed overflow and functional urinary incontinence.  Colleague previously ordered Myrbetriq but appears was not started.  Unclear, possibly not " covered by hospice.  If this is the case, would alternatively use less CNS active anticholinergic agents such as trospium.    (R29.6) Falls frequently  (R26.89) Impaired gait and mobility  Comment: Multifactorial related to cognition, chronic deconditioning, impaired mobility and environmental awareness particularly with kyphoscoliosis, effects of cancer, possible medication effects.  Plan:   -Discontinue oxybutynin    MED REC REQUIRED  Post Medication Reconciliation Status: medication reconcilation previously completed during another office visit      Orders:  [x] Dc vitamin E  [x] Discontinue oxybutynin, start myrbetriq daily    Electronically signed by:     Benjamin Rosenstein, MD, MA  [facility]     This note was completed with the assistance of dictation software. Typos and word substitution-errors are expected and unintended.      41 MINUTES SPENT BY ME on the date of service doing chart review, history, exam, documentation & further activities per the note.

## 2024-11-25 ENCOUNTER — ASSISTED LIVING VISIT (OUTPATIENT)
Dept: GERIATRICS | Facility: CLINIC | Age: 84
End: 2024-11-25
Payer: COMMERCIAL

## 2024-11-25 VITALS
WEIGHT: 110 LBS | DIASTOLIC BLOOD PRESSURE: 77 MMHG | TEMPERATURE: 97.3 F | SYSTOLIC BLOOD PRESSURE: 135 MMHG | HEART RATE: 84 BPM | OXYGEN SATURATION: 93 % | BODY MASS INDEX: 18.78 KG/M2 | RESPIRATION RATE: 20 BRPM | HEIGHT: 64 IN

## 2024-11-25 DIAGNOSIS — Z51.5 HOSPICE CARE PATIENT: Primary | ICD-10-CM

## 2024-11-25 DIAGNOSIS — E46 PROTEIN-CALORIE MALNUTRITION, UNSPECIFIED SEVERITY (H): ICD-10-CM

## 2024-11-25 DIAGNOSIS — G30.1 MODERATE LATE ONSET ALZHEIMER'S DEMENTIA WITH ANXIETY (H): ICD-10-CM

## 2024-11-25 DIAGNOSIS — E03.9 HYPOTHYROIDISM, UNSPECIFIED TYPE: ICD-10-CM

## 2024-11-25 DIAGNOSIS — N95.0 POSTMENOPAUSAL BLEEDING: ICD-10-CM

## 2024-11-25 DIAGNOSIS — I87.2 VENOUS STASIS DERMATITIS OF BOTH LOWER EXTREMITIES: ICD-10-CM

## 2024-11-25 DIAGNOSIS — C54.1 ENDOMETRIAL CARCINOMA (H): ICD-10-CM

## 2024-11-25 DIAGNOSIS — F02.B4 MODERATE LATE ONSET ALZHEIMER'S DEMENTIA WITH ANXIETY (H): ICD-10-CM

## 2024-11-25 DIAGNOSIS — M41.9 KYPHOSCOLIOSIS: ICD-10-CM

## 2024-11-25 DIAGNOSIS — N39.46 URINARY INCONTINENCE, MIXED: ICD-10-CM

## 2024-11-25 DIAGNOSIS — G89.4 CHRONIC PAIN SYNDROME: ICD-10-CM

## 2024-11-25 DIAGNOSIS — M80.00XD AGE-RELATED OSTEOPOROSIS WITH CURRENT PATHOLOGICAL FRACTURE WITH ROUTINE HEALING, SUBSEQUENT ENCOUNTER: ICD-10-CM

## 2024-11-25 PROCEDURE — G0438 PPPS, INITIAL VISIT: HCPCS

## 2024-11-25 PROCEDURE — 99349 HOME/RES VST EST MOD MDM 40: CPT | Mod: 25

## 2024-11-25 RX ORDER — MORPHINE SULFATE 20 MG/5ML
0.25 SOLUTION ORAL EVERY 4 HOURS PRN
COMMUNITY

## 2024-11-25 RX ORDER — LORAZEPAM 1 MG/1
1 TABLET ORAL EVERY 4 HOURS PRN
COMMUNITY

## 2024-11-25 NOTE — PROGRESS NOTES
"[facility] GERIATRICS  Chief Complaint   Patient presents with    Wellness Visit     [facility] Record Number:  [id number]  Place of Service where encounter took place:  [facility] [[id number]    SUBJECTIVE:   Elizabeth is a 84 year old who presents for Preventive Visit.    Are you in the first 12 months of your Medicare coverage?  No    HPI  PMH:  MCI, osteoporosis, anal cancer, hypothyroidism, and frequent falls. She has been a resident at this facility since 07/2021. Recently diagnosed with endometrial carcinoma and signed on to hospice in May 2024.     Today:  Writer received fax notification that patient fell last evening with no obvious injuries. Day shift nursing staff reports no acute medical concerns.    Patient is seen today for a visit in her room. She is laying in her bed watching television. She endorses that \"I fall frequently\" but does not recall falling last night. Denies feeling pain anywhere; reports, \"They give me drugs for pain.. if I'm feeling pain, that would be spooky\". Notes, \"I feel pain when I can't watch TV\". Reports feeling frustrated that she does not know how to change the channel. Appetite is baseline; notes, \"The food is remarkable\". Bowels are \"normal\"; notes having daily BMs. Endorses chronic urinary frequency; she wears incontinence briefs. Notes, \"I am potty training myself\". Sleeping poorly but notes \"I sleep often in snippets\"; she walks around the unit frequently overnight. Mood is baseline; \"no\" depression but \"I forget so much\"; she gets saddened when she forgets the residents she meets. Notes difficulty with recall. Denies CP, palpitations, lightheadedness, dizziness, fatigue, SOB, fever, chills, nausea/vomiting concerns today.      Have you ever done Advance Care Planning? (For example, a Health Directive, POLST, or a discussion with a medical provider or your loved ones about your wishes): Yes, advance care planning is on file. Patient has POLST.      "   Fall risk  Fallen 2 or more times in the past year?: (!) Yes  click delete button to remove this line now  Cognitive Screening Not appropriate due to known dementia      Reviewed and updated as needed this visit by clinical staff    Allergies               Reviewed and updated as needed this visit by Provider                  Social History     Tobacco Use    Smoking status: Never    Smokeless tobacco: Never   Substance Use Topics    Alcohol use: No       Do you have a current opioid prescription? (!) YES , hospice patient  How severe is your pain on a scale from 1-10? 0/10   Do you use any other controlled substances or medications that are not prescribed by a provider? None      Current providers sharing in care for this patient include:   Patient Care Team:  Jada Martinez, APRN CNP as PCP - General (Nurse Practitioner Primary Care)  Zaire Madison MD as MD (Radiation Oncology)  Emir Rosas MD as MD (Colon and Rectal Surgery)  Zaire Phan MD as MD (Orthopedics)  [facility] ([facility] ([facility] ([place]  Monae Fernandes MD as MD (Family Practice)  Nicol Goldman MD as MD (OB/Gyn)  Maggie Mcgowan MD as MD (OB/Gyn)  José Miguel Miranda DO as MD (Neurology)  [facility] as Other (see comments)  Rosenstein, Benjamin, MD as MD (Family Medicine)  Margaret De Paz as Case Management Specialist  [facility]  [facility] as Pharmacist (Pharmacist)  Natividad Thornton LSW as Lead Care Coordinator (Primary Care - CC)  Fransisca Ruvalcaba as Nursing Assistant  Rosenstein, Benjamin, MD as Assigned PCP  Emir Rosas MD as Assigned Surgical Provider  Samantha Portillo MD as MD (OB/Gyn)  Samantha Portillo MD as Assigned OBGYN Provider  Kiarra Rawls MD as MD (Gynecologic Oncology)  Samantha Portillo MD as MD (OB/Gyn)  Kiarra Rawls MD as Assigned Cancer Care  "Provider    The following health maintenance items are reviewed in Epic and correct as of today:  Health Maintenance   Topic Date Due    DTAP/TDAP/TD IMMUNIZATION (1 - Tdap) Never done    ZOSTER IMMUNIZATION (1 of 2) Never done    MEDICARE ANNUAL WELLNESS VISIT  Never done    TSH W/FREE T4 REFLEX  11/17/2024    FALL RISK ASSESSMENT  11/25/2025    ADVANCE CARE PLANNING  11/25/2029    COLORECTAL CANCER SCREENING  04/20/2032    DEXA  12/07/2038    PHQ-2 (once per calendar year)  Completed    INFLUENZA VACCINE  Completed    Pneumococcal Vaccine: 65+ Years  Completed    RSV VACCINE  Completed    COVID-19 Vaccine  Completed    HPV IMMUNIZATION  Aged Out    MENINGITIS IMMUNIZATION  Aged Out    RSV MONOCLONAL ANTIBODY  Aged Out           Mammogram Screening - Patient over age 75, has elected to discontinue screenings.  Pertinent mammograms are reviewed under the imaging tab.    Review of Systems  Patient denies fever, chills, headache, lightheadedness, dizziness, rhinorrhea, cough, congestion, shortness of breath, chest pain, palpitations, abdominal pain, n/v, diarrhea, constipation, change in appetite, change in sleep pattern, change in mood, dysuria, burning or pain with urination.  Other than stated in HPI all other review of systems is negative.    OBJECTIVE:   /77   Pulse 84   Temp 97.3  F (36.3  C)   Resp 20   Ht 1.626 m (5' 4\")   Wt 49.9 kg (110 lb)   LMP  (LMP Unknown)   SpO2 93%   BMI 18.88 kg/m   Estimated body mass index is 18.88 kg/m  as calculated from the following:    Height as of this encounter: 1.626 m (5' 4\").    Weight as of this encounter: 49.9 kg (110 lb).  Physical Exam  GENERAL APPEARANCE:  Alert, elderly, in no distress  HEENT:  atraumatic, [place], EOM intact, moist mucus membranes  RESP:  non-labored breathing, moves good air, no respiratory distress, no cough  CV:  Rate regular, S1 S2 noted, trace BLE edema  ABDOMEN:  soft, non-distended, non-tender, bowel sounds active  M/S:  moves all " extremities, strength and tone equal bilaterally,  arthritic changes noted in hands  SKIN:  warm, dry, thin, fragile; BLE hemosiderin staining and venous stasis dermatitis  NEURO:   Face is symmetric, examination of sensation by touch normal, follows and tracks, slow speech, memory impaired  PSYCH:  calm, cooperative      Labs reviewed as per Epic and/or Care Everywhere.      ASSESSMENT / PLAN:   Hospice care patient   Endometrial carcinoma (H)   Endometrial hyperplasia   Postmenopausal bleeding   Postmenopausal bleeding episode and 3/2024 prompted further evaluation resulting in new diagnosis of cancer of uterus. Met with oncology in May; noted recovery from surgery will be difficult due to current physical limitations. Started on Megace on 5/23/24 to help with vaginal bleeding. Today, no concerns reported.   -Morphine and lorazepam as needed  -Continue Comfort focused care  -Continue Megace 40 mg twice daily  -Appreciate cares and recommendations from hospice team    Urinary frequency   Chronic.  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 Resides in [facility] for supportive cares.  -continue 24/7 nursing and supportive cares  -monitor mood and behaviors     Age-related osteoporosis with current pathological fracture with routine healing, subsequent encounter  Pain  Hx of left proximal humerus fx and left distal radius fx (diagnosed in 9/23). Per chart review, followed up with [facility] on 1/4/24; notes fractures have healed well, residual pain and decreased motion may be secondary to shoulder osteoarthritis. Today, denies pain.  -continue to WBAT and activities as tolerated  -morphine as noted above  -continue APAP 1000 mg TID  -continue gabapentin 100 mg TID  -monitor effectiveness     Hypothyroidism, unspecified type  Last TSH 5.68 (11/17/23).  -continue levothyroxine 50 mcg daily  -no labs recommended; hospice patient      Kyphoscoliosis  Chronic.  -continue lumbar brace for support     Protein-calorie malnutrition, unspecified severity (H24)  Weight  stable 110# (11/10/24).  -continue 4 ounce nutritional shake daily  -encourage healthy food intake  -follow weights    Venous stasis dermatitis of both lower extremities   Chronic. BLE with hemosiderin staining,  -continue aquaphor to legs twice daily  -start compression daily, off at bedtime  -continue routine skin checks      COUNSELING:  Special attention given to:       Healthy diet/nutrition       Bladder control       Fall risk prevention        She reports that she has never smoked. She has never used smokeless tobacco.      Appropriate preventive services were discussed with this patient, including applicable screening as appropriate for cardiovascular disease, diabetes, osteopenia/osteoporosis, and glaucoma.  As appropriate for age/gender, discussed screening for colorectal cancer, prostate cancer, breast cancer, and cervical cancer. Checklist reviewing preventive services available has been given to the patient.    Reviewed patients plan of care and provided an AVS. The Basic Care Plan (routine screening as documented in Health Maintenance) for Elizabeth meets the Care Plan requirement. This Care Plan has been established and reviewed with the Patient.          GRAHAM Gutierrez [facility] GERIATRICS    Identified Health Risks:  I have reviewed Opioid Use Disorder and Substance Use Disorder risk factors and made any needed referrals.     Electronically signed by:  Dr. Jada Martinez, DNP, APRN, AGNP-BC, PHN

## 2024-11-25 NOTE — LETTER
" 11/25/2024      Elizabeth Taylor  [facility]   [address]  [place] [address]        M [facility] GERIATRICS  Chief Complaint   Patient presents with     Wellness Visit     [place] [facility] Record Number:  [id number]  Place of Service where encounter took place:  [facility] ([facility]) [[id number]]    SUBJECTIVE:   Elizabeth is a 84 year old who presents for Preventive Visit.    Are you in the first 12 months of your Medicare coverage?  No    HPI  PMH:  MCI, osteoporosis, anal cancer, hypothyroidism, and frequent falls. She has been a resident at this facility since 07/2021. Recently diagnosed with endometrial carcinoma and signed on to hospice in May 2024.     Today:  Writer received fax notification that patient fell last evening with no obvious injuries. Day shift nursing staff reports no acute medical concerns.    Patient is seen today for a visit in her room. She is laying in her bed watching television. She endorses that \"I fall frequently\" but does not recall falling last night. Denies feeling pain anywhere; reports, \"They give me drugs for pain.. if I'm feeling pain, that would be spooky\". Notes, \"I feel pain when I can't watch TV\". Reports feeling frustrated that she does not know how to change the channel. Appetite is baseline; notes, \"The food is remarkable\". Bowels are \"normal\"; notes having daily BMs. Endorses chronic urinary frequency; she wears incontinence briefs. Notes, \"I am potty training myself\". Sleeping poorly but notes \"I sleep often in snippets\"; she walks around the unit frequently overnight. Mood is baseline; \"no\" depression but \"I forget so much\"; she gets saddened when she forgets the residents she meets. Notes difficulty with recall. Denies CP, palpitations, lightheadedness, dizziness, fatigue, SOB, fever, chills, nausea/vomiting concerns today.      Have you ever done Advance Care Planning? (For example, a Health Directive, POLST, or a discussion with a medical " provider or your loved ones about your wishes): Yes, advance care planning is on file. Patient has POLST.        Fall risk  Fallen 2 or more times in the past year?: (!) Yes  click delete button to remove this line now  Cognitive Screening Not appropriate due to known dementia      Reviewed and updated as needed this visit by clinical staff    Allergies               Reviewed and updated as needed this visit by Provider                  Social History     Tobacco Use     Smoking status: Never     Smokeless tobacco: Never   Substance Use Topics     Alcohol use: No       Do you have a current opioid prescription? (!) YES , hospice patient  How severe is your pain on a scale from 1-10? 0/10   Do you use any other controlled substances or medications that are not prescribed by a provider? None      Current providers sharing in care for this patient include:   Patient Care Team:  Jada Martinez, APRN CNP as PCP - General (Nurse Practitioner Primary Care)  Zaire Madison MD as MD (Radiation Oncology)  Emir Rosas MD as MD (Colon and Rectal Surgery)  Zaire Phan MD as MD (Orthopedics)  [facility] ([facility]ALTH [facility]ENCY ([facility]), ([facility]))  [facility] as MD (Family Practice)  Nicol Goldman MD as MD (OB/Gyn)  Maggie Mcgowan MD as MD (OB/Gyn)  José Miguel Miranda DO as MD (Neurology)  [facility] as Other (see comments)  Rosenstein, Benjamin, MD as MD (Family Medicine)  Margaret De Paz as Case Management Specialist  [facility]  Marleni Jewell formerly [facility] as Pharmacist (Pharmacist)  Natividad Thornton LSW as Lead Care Coordinator (Primary Care - [facility])  Fransisca Ruvalcaba as Nursing Assistant  Rosenstein, Benjamin, MD as Assigned PCP  Emir Rosas MD as Assigned Surgical Provider  Samantha Portillo MD as MD (OB/Gyn)  Samantha Portillo MD as Assigned OBGYN Provider  Kiarra Rawls MD as MD  "(Gynecologic Oncology)  Samantha Portillo MD as MD (OB/Gyn)  Kiarra Rawls MD as Assigned Cancer Care Provider    The following health maintenance items are reviewed in Epic and correct as of today:  Health Maintenance   Topic Date Due     DTAP/TDAP/TD IMMUNIZATION (1 - Tdap) Never done     ZOSTER IMMUNIZATION (1 of 2) Never done     MEDICARE ANNUAL WELLNESS VISIT  Never done     TSH W/FREE T4 REFLEX  11/17/2024     FALL RISK ASSESSMENT  11/25/2025     ADVANCE CARE PLANNING  11/25/2029     COLORECTAL CANCER SCREENING  04/20/2032     DEXA  12/07/2038     PHQ-2 (once per calendar year)  Completed     INFLUENZA VACCINE  Completed     Pneumococcal Vaccine: 65+ Years  Completed     RSV VACCINE  Completed     COVID-19 Vaccine  Completed     HPV IMMUNIZATION  Aged Out     MENINGITIS IMMUNIZATION  Aged Out     RSV MONOCLONAL ANTIBODY  Aged Out           Mammogram Screening - Patient over age 75, has elected to discontinue screenings.  Pertinent mammograms are reviewed under the imaging tab.    Review of Systems  Patient denies fever, chills, headache, lightheadedness, dizziness, rhinorrhea, cough, congestion, shortness of breath, chest pain, palpitations, abdominal pain, n/v, diarrhea, constipation, change in appetite, change in sleep pattern, change in mood, dysuria, burning or pain with urination.  Other than stated in HPI all other review of systems is negative.    OBJECTIVE:   /77   Pulse 84   Temp 97.3  F (36.3  C)   Resp 20   Ht 1.626 m (5' 4\")   Wt 49.9 kg (110 lb)   LMP  (LMP Unknown)   SpO2 93%   BMI 18.88 kg/m   Estimated body mass index is 18.88 kg/m  as calculated from the following:    Height as of this encounter: 1.626 m (5' 4\").    Weight as of this encounter: 49.9 kg (110 lb).  Physical Exam  GENERAL APPEARANCE:  Alert, elderly, in no distress  HEENT:  atraumatic, [place], EOM intact, moist mucus membranes  RESP:  non-labored breathing, moves good air, no respiratory distress, no cough  CV: "  Rate regular, S1 S2 noted, trace BLE edema  ABDOMEN:  soft, non-distended, non-tender, bowel sounds active  M/S:  moves all extremities, strength and tone equal bilaterally,  arthritic changes noted in hands  SKIN:  warm, dry, thin, fragile; BLE hemosiderin staining and venous stasis dermatitis  NEURO:   Face is symmetric, examination of sensation by touch normal, follows and tracks, slow speech, memory impaired  PSYCH:  calm, cooperative      Labs reviewed as per [facility] and/or Care Everywhere.      ASSESSMENT / PLAN:   Hospice care patient   Endometrial carcinoma (H)   Endometrial hyperplasia   Postmenopausal bleeding   Postmenopausal bleeding episode and 3/2024 prompted further evaluation resulting in new diagnosis of cancer of uterus. Met with oncology in May; noted recovery from surgery will be difficult due to current physical limitations. Started on Megace on 5/23/24 to help with vaginal bleeding. Today, no concerns reported.   -Morphine and lorazepam as needed  -Continue Comfort focused care  -Continue Megace 40 mg twice daily  -Appreciate cares and recommendations from hospice team    Urinary frequency   Chronic.  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 Resides in [facility] for supportive cares.  -continue 24/7 nursing and supportive cares  -monitor mood and behaviors     Age-related osteoporosis with current pathological fracture with routine healing, subsequent encounter  Pain  Hx of left proximal humerus fx and left distal radius fx (diagnosed in 9/23). Per chart review, followed up with [facility] ortho on 1/4/24; notes fractures have healed well, residual pain and decreased motion may be secondary to shoulder osteoarthritis. Today, denies pain.  -continue to WBAT and activities as tolerated  -morphine as noted above  -continue APAP 1000 mg TID  -continue gabapentin 100 mg TID  -monitor effectiveness     Hypothyroidism,  unspecified type  Last TSH 5.68 (11/17/23).  -continue levothyroxine 50 mcg daily  -no labs recommended; hospice patient     Kyphoscoliosis  Chronic.  -continue lumbar brace for support     Protein-calorie malnutrition, unspecified severity (H24)  Weight  stable 110# (11/10/24).  -continue 4 ounce nutritional shake daily  -encourage healthy food intake  -follow weights    Venous stasis dermatitis of both lower extremities   Chronic. BLE with hemosiderin staining,  -continue aquaphor to legs twice daily  -start compression daily, off at bedtime  -continue routine skin checks      COUNSELING:  Special attention given to:       Healthy diet/nutrition       Bladder control       Fall risk prevention        She reports that she has never smoked. She has never used smokeless tobacco.      Appropriate preventive services were discussed with this patient, including applicable screening as appropriate for cardiovascular disease, diabetes, osteopenia/osteoporosis, and glaucoma.  As appropriate for age/gender, discussed screening for colorectal cancer, prostate cancer, breast cancer, and cervical cancer. Checklist reviewing preventive services available has been given to the patient.    Reviewed patients plan of care and provided an AVS. The Basic Care Plan (routine screening as documented in Health Maintenance) for Elizabeth meets the Care Plan requirement. This Care Plan has been established and reviewed with the Patient.          GRAHAM Gutierrez CNP  [facility] GERIATRICS    Identified Health Risks:  I have reviewed Opioid Use Disorder and Substance Use Disorder risk factors and made any needed referrals.     Electronically signed by:  Dr. Jada Martinez, DNP, APRN, AGNP-BC, PHN        Sincerely,        Jada Martinez, GRAHAM CNP

## 2024-12-12 ENCOUNTER — ASSISTED LIVING VISIT (OUTPATIENT)
Dept: GERIATRICS | Facility: CLINIC | Age: 84
End: 2024-12-12
Payer: COMMERCIAL

## 2024-12-12 VITALS
BODY MASS INDEX: 19.5 KG/M2 | WEIGHT: 114.2 LBS | RESPIRATION RATE: 18 BRPM | OXYGEN SATURATION: 95 % | SYSTOLIC BLOOD PRESSURE: 120 MMHG | DIASTOLIC BLOOD PRESSURE: 68 MMHG | HEART RATE: 78 BPM | TEMPERATURE: 97.2 F | HEIGHT: 64 IN

## 2024-12-12 DIAGNOSIS — S81.802S OPEN WOUND OF LEFT KNEE, LEG, AND ANKLE, SEQUELA: ICD-10-CM

## 2024-12-12 DIAGNOSIS — Z87.81 HISTORY OF FRACTURE OF RIGHT HIP: ICD-10-CM

## 2024-12-12 DIAGNOSIS — S81.002S OPEN WOUND OF LEFT KNEE, LEG, AND ANKLE, SEQUELA: ICD-10-CM

## 2024-12-12 DIAGNOSIS — F02.B4 MODERATE LATE ONSET ALZHEIMER'S DEMENTIA WITH ANXIETY (H): ICD-10-CM

## 2024-12-12 DIAGNOSIS — I87.2 VENOUS STASIS DERMATITIS OF BOTH LOWER EXTREMITIES: ICD-10-CM

## 2024-12-12 DIAGNOSIS — N85.00 ENDOMETRIAL HYPERPLASIA: ICD-10-CM

## 2024-12-12 DIAGNOSIS — N39.46 URINARY INCONTINENCE, MIXED: ICD-10-CM

## 2024-12-12 DIAGNOSIS — S91.002S OPEN WOUND OF LEFT KNEE, LEG, AND ANKLE, SEQUELA: ICD-10-CM

## 2024-12-12 DIAGNOSIS — M47.817 LUMBOSACRAL SPONDYLOSIS WITHOUT MYELOPATHY: ICD-10-CM

## 2024-12-12 DIAGNOSIS — G89.4 CHRONIC PAIN SYNDROME: ICD-10-CM

## 2024-12-12 DIAGNOSIS — C54.1 ENDOMETRIAL CARCINOMA (H): ICD-10-CM

## 2024-12-12 DIAGNOSIS — G30.1 MODERATE LATE ONSET ALZHEIMER'S DEMENTIA WITH ANXIETY (H): ICD-10-CM

## 2024-12-12 DIAGNOSIS — E46 PROTEIN-CALORIE MALNUTRITION, UNSPECIFIED SEVERITY (H): ICD-10-CM

## 2024-12-12 DIAGNOSIS — R35.0 URINARY FREQUENCY: ICD-10-CM

## 2024-12-12 DIAGNOSIS — Z51.5 HOSPICE CARE PATIENT: Primary | ICD-10-CM

## 2024-12-12 DIAGNOSIS — N95.0 POSTMENOPAUSAL BLEEDING: ICD-10-CM

## 2024-12-12 DIAGNOSIS — E03.9 HYPOTHYROIDISM, UNSPECIFIED TYPE: ICD-10-CM

## 2024-12-12 DIAGNOSIS — M81.0 AGE-RELATED OSTEOPOROSIS WITHOUT CURRENT PATHOLOGICAL FRACTURE: ICD-10-CM

## 2024-12-12 DIAGNOSIS — M41.9 KYPHOSCOLIOSIS: ICD-10-CM

## 2024-12-12 RX ORDER — HALOPERIDOL 0.5 MG/1
0.5 TABLET ORAL EVERY 4 HOURS PRN
COMMUNITY

## 2024-12-12 RX ORDER — CEPHALEXIN 500 MG/1
500 CAPSULE ORAL 3 TIMES DAILY
COMMUNITY

## 2024-12-12 NOTE — LETTER
" 12/12/2024      Elizabeth Taylor  [facility]   [address]  [place] [address]        M [facility] GERIATRICS    Chief Complaint   Patient presents with     Assisted Living Regulatory     HPI:  Elizabeth Taylor is a 84 year old  (1940), who is being seen today for an episodic care visit at: [facility] ([facility]) [[id number]]. HPI information obtained from: facility chart records, facility staff, patient report and [facility] chart review. PMH: MCI, osteoporosis, anal cancer, hypothyroidism, and frequent falls. She has been a resident at this facility since 07/2021. Recently diagnosed with endometrial carcinoma and signed on to hospice in May 2024.     Today:  Nursing staff reports no acute medical concerns. Patient was recently started on Keflex for left knee wound infection.     Patient is seen today for a visit in her room. She is sitting partially upright eating breakfast. Feels \"okay\". Feels \"not really\" any pain; endorses taking pain medications which is effective. Feels \"no\" pain in her knee. Appetite is \"amazing\". Last BM was \"yesterday\". No belly pain. Sleeping \"hard to say\"; notes, \"I never do\" sleep; reports, \"I sleep then wake up and sleep then wake up.. it is absolutely fine for me\". Mood is baseline; \"no\" depression but notes, \"there was something I was angry about\" but she cannot recall. Denies CP, palpitations, lightheadedness, dizziness, fatigue, SOB, fever, chills, nausea/vomiting concerns today.      Allergies, and PMH/PSH reviewed in [facility] today.  REVIEW OF SYSTEMS:  4 point ROS including Respiratory, CV, GI and , other than that noted in the HPI,  is negative    Objective:   /68   Pulse 78   Temp 97.2  F (36.2  C)   Resp 18   Ht 1.626 m (5' 4\")   Wt 51.8 kg (114 lb 3.2 oz)   LMP  (LMP Unknown)   SpO2 95%   BMI 19.60 kg/m    GENERAL APPEARANCE:  Alert, elderly, in no distress  HEENT:  atraumatic, [place], EOM intact, moist mucus membranes  RESP:  " non-labored breathing, no respiratory distress, no cough  CV:  Rate regular, S1 S2 noted, no edema  ABDOMEN:  soft, non-distended, non-tender, bowel sounds active  M/S:   uses wheelchair and walker, moves all extremities, strength and tone equal bilaterally, no calf pain, arthritic changes noted in hands  SKIN:  warm, dry, thin, fragile; significant venous stasis dermatitis; erythema R>L shin; left knee covered in bandage, CDI  NEURO:   Face is symmetric, examination of sensation by touch normal, follows and tracks, slow speech  PSYCH:  calm, cooperative      Labs reviewed as per [facility] and/or Care Everywhere.      Assessment/Plan:  Hospice care patient   Endometrial carcinoma (H)   Endometrial hyperplasia   Postmenopausal bleeding   Postmenopausal bleeding episode and 3/2024 prompted further evaluation resulting in new diagnosis of cancer of uterus. Met with oncology in May; noted recovery from surgery will be difficult due to current physical limitations. Started on Megace on 5/23/24 to help with vaginal bleeding. Today, no concerns reported.   -Morphine and lorazepam as needed  -Continue Comfort focused care  -Continue Megace 40 mg twice daily  -Appreciate cares and recommendations from hospice team     Urinary frequency   Urinary incontinence, mixed   Ongoing. Using incontinence briefs.   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 Resides in [facility] for supportive cares.  -continue 24/7 nursing and supportive cares  -monitor mood and behaviors     Age-related osteoporosis without current pathological fracture   History of fracture of right hip   Lumbosacral spondylosis without myelopathy   Chronic pain syndrome   Hx of fractures in left humerus, distal radius, and right hip. Lumbar spondylosis noted in 2021. Today, feels no pain.  -continue to WBAT and activities as tolerated  -morphine as noted above  -continue APAP 1000 mg  TID  -continue gabapentin 100 mg TID  -monitor effectiveness     Hypothyroidism, unspecified type  Last TSH 5.68 (11/17/23).  -continue levothyroxine 50 mcg daily  -no labs recommended; hospice patient     Kyphoscoliosis  Chronic.  -continue lumbar brace for support     Protein-calorie malnutrition, unspecified severity (H24)  Weight now 114# (12/1); was 110# (11/10/24).  -continue 4 ounce nutritional shake daily  -encourage healthy food intake  -follow weights     Venous stasis dermatitis of both lower extremities   Chronic. BLE with hemosiderin staining R>L.  -continue aquaphor to legs twice daily  -continue compression daily, off at bedtime  -continue routine skin checks    Open wound of left knee, leg, and ankle, sequela   Started on Keflex by hospice team on 12/12/24. Covered in dressing today.   -Continue Keflex as ordered (until 12/19/24)  -Monitor for worsening signs/symptoms      MED REC REQUIRED  Post Medication Reconciliation Status: patient was not discharged from an inpatient facility or TCU      45 minutes spent on the date of this encounter doing chart review, review labs, discussion with nursing staff, patient visit, and documentation.     Electronically signed by: Dr. Jada Martinez, DNP, APRN, AGNP-BC, PHN    This note was completed with the assistance of dictation software. Typos and word substitution-errors are expected and unintended.          Sincerely,        Jada Martinez, GRAHAM CNP

## 2024-12-12 NOTE — PROGRESS NOTES
"[facility] GERIATRICS    Chief Complaint   Patient presents with    Assisted Living Regulatory     HPI:  Elizabeth Taylor is a 84 year old  (1940), who is being seen today for an episodic care visit at: [facility] ([facility]) [[id number]]. HPI information obtained from: facility chart records, facility staff, patient report and [facility] chart review. PMH: MCI, osteoporosis, anal cancer, hypothyroidism, and frequent falls. She has been a resident at this facility since 07/2021. Recently diagnosed with endometrial carcinoma and signed on to hospice in May 2024.     Today:  Nursing staff reports no acute medical concerns. Patient was recently started on Keflex for left knee wound infection.     Patient is seen today for a visit in her room. She is sitting partially upright eating breakfast. Feels \"okay\". Feels \"not really\" any pain; endorses taking pain medications which is effective. Feels \"no\" pain in her knee. Appetite is \"amazing\". Last BM was \"yesterday\". No belly pain. Sleeping \"hard to say\"; notes, \"I never do\" sleep; reports, \"I sleep then wake up and sleep then wake up.. it is absolutely fine for me\". Mood is baseline; \"no\" depression but notes, \"there was something I was angry about\" but she cannot recall. Denies CP, palpitations, lightheadedness, dizziness, fatigue, SOB, fever, chills, nausea/vomiting concerns today.      Allergies, and PMH/PSH reviewed in [facility] today.  REVIEW OF SYSTEMS:  4 point ROS including Respiratory, CV, GI and , other than that noted in the HPI,  is negative    Objective:   /68   Pulse 78   Temp 97.2  F (36.2  C)   Resp 18   Ht 1.626 m (5' 4\")   Wt 51.8 kg (114 lb 3.2 oz)   LMP  (LMP Unknown)   SpO2 95%   BMI 19.60 kg/m    GENERAL APPEARANCE:  Alert, elderly, in no distress  HEENT:  atraumatic, [place], EOM intact, moist mucus membranes  RESP:  non-labored breathing, no respiratory distress, no cough  CV:  Rate regular, S1 S2 noted, no edema  ABDOMEN:  soft, " non-distended, non-tender, bowel sounds active  M/S:   uses wheelchair and walker, moves all extremities, strength and tone equal bilaterally, no calf pain, arthritic changes noted in hands  SKIN:  warm, dry, thin, fragile; significant venous stasis dermatitis; erythema R>L shin; left knee covered in bandage, CDI  NEURO:   Face is symmetric, examination of sensation by touch normal, follows and tracks, slow speech  PSYCH:  calm, cooperative      Labs reviewed as per Epic and/or Care Everywhere.      Assessment/Plan:  Hospice care patient   Endometrial carcinoma (H)   Endometrial hyperplasia   Postmenopausal bleeding   Postmenopausal bleeding episode and 3/2024 prompted further evaluation resulting in new diagnosis of cancer of uterus. Met with oncology in May; noted recovery from surgery will be difficult due to current physical limitations. Started on Megace on 5/23/24 to help with vaginal bleeding. Today, no concerns reported.   -Morphine and lorazepam as needed  -Continue Comfort focused care  -Continue Megace 40 mg twice daily  -Appreciate cares and recommendations from hospice team     Urinary frequency   Urinary incontinence, mixed   Ongoing. Using incontinence briefs.   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 Resides in [facility] for supportive cares.  -continue 24/7 nursing and supportive cares  -monitor mood and behaviors     Age-related osteoporosis without current pathological fracture   History of fracture of right hip   Lumbosacral spondylosis without myelopathy   Chronic pain syndrome   Hx of fractures in left humerus, distal radius, and right hip. Lumbar spondylosis noted in 2021. Today, feels no pain.  -continue to WBAT and activities as tolerated  -morphine as noted above  -continue APAP 1000 mg TID  -continue gabapentin 100 mg TID  -monitor effectiveness     Hypothyroidism, unspecified type  Last TSH 5.68  (11/17/23).  -continue levothyroxine 50 mcg daily  -no labs recommended; hospice patient     Kyphoscoliosis  Chronic.  -continue lumbar brace for support     Protein-calorie malnutrition, unspecified severity (H24)  Weight now 114# (12/1); was 110# (11/10/24).  -continue 4 ounce nutritional shake daily  -encourage healthy food intake  -follow weights     Venous stasis dermatitis of both lower extremities   Chronic. BLE with hemosiderin staining R>L.  -continue aquaphor to legs twice daily  -continue compression daily, off at bedtime  -continue routine skin checks    Open wound of left knee, leg, and ankle, sequela   Started on Keflex by hospice team on 12/12/24. Covered in dressing today.   -Continue Keflex as ordered (until 12/19/24)  -Monitor for worsening signs/symptoms      MED REC REQUIRED  Post Medication Reconciliation Status: patient was not discharged from an inpatient facility or TCU      45 minutes spent on the date of this encounter doing chart review, review labs, discussion with nursing staff, patient visit, and documentation.     Electronically signed by: Dr. Jada Martinez, DNP, APRN, AGNP-BC, PHN    This note was completed with the assistance of dictation software. Typos and word substitution-errors are expected and unintended.

## 2025-02-10 ENCOUNTER — ASSISTED LIVING VISIT (OUTPATIENT)
Dept: GERIATRICS | Facility: CLINIC | Age: 85
End: 2025-02-10
Payer: OTHER MISCELLANEOUS

## 2025-02-10 VITALS
HEART RATE: 74 BPM | TEMPERATURE: 98.2 F | WEIGHT: 106.8 LBS | DIASTOLIC BLOOD PRESSURE: 77 MMHG | HEIGHT: 64 IN | RESPIRATION RATE: 18 BRPM | BODY MASS INDEX: 18.23 KG/M2 | OXYGEN SATURATION: 95 % | SYSTOLIC BLOOD PRESSURE: 115 MMHG

## 2025-02-10 DIAGNOSIS — N39.46 URINARY INCONTINENCE, MIXED: ICD-10-CM

## 2025-02-10 DIAGNOSIS — Z51.5 HOSPICE CARE PATIENT: Primary | ICD-10-CM

## 2025-02-10 DIAGNOSIS — E46 PROTEIN-CALORIE MALNUTRITION, UNSPECIFIED SEVERITY: ICD-10-CM

## 2025-02-10 DIAGNOSIS — F02.B4 MODERATE LATE ONSET ALZHEIMER'S DEMENTIA WITH ANXIETY (H): ICD-10-CM

## 2025-02-10 DIAGNOSIS — G30.1 MODERATE LATE ONSET ALZHEIMER'S DEMENTIA WITH ANXIETY (H): ICD-10-CM

## 2025-02-10 DIAGNOSIS — C54.1 ENDOMETRIAL CARCINOMA (H): ICD-10-CM

## 2025-02-10 NOTE — PROGRESS NOTES
"[facility] GERIATRICS    Chief Complaint   Patient presents with    RECHECK     HPI:  Elizabeth Taylor is a 84 year old  (1940), former emereet  and teacher, with pmhx including dementia, osteoporosis, frequent falls, anal cancer,  and diagnosed with endometrial cancer 5/2024 now followed with hospice who is being seen today for an episodic care visit at: [facility] ([facility]) [[id number]].     History limited due to her cognitive impairment.  Overall doing well without concerns.  Notes she is not feeling any pain.  Is happy that she is feeling quite well.  Appetite is doing well.  Is happy with her care.  Fairly repetitive with these thoughts.    NH ROS  Nursing concerns: None.    Appetite: Normal.    Weight changes: Weights fluctuate, relatively stable between 107-110lb  Bowel: Incontinent.    Bladder: Incontinent.    Skin: Venous stasis changes of LEs, no acute concerns. Wound of left knee from Dec healling   Sleep: Sleeping well.    Mood: Anxious at times.    Cognition: Impaired, last BIMS 10/15  Behavior: No concerns.    Mobility: Ambulates independently with 4ww.    ADL assistance: Assist with hygiene, toileting, bathing.    Pain:None .  Falls: Last fall 12/6/24. History of frequent falls  Resp: Negative.  Cardio: Negative  Ethics: DNR/I, comfort focused    Allergies, and PMH/PSH reviewed in [facility] today.  REVIEW OF SYSTEMS:    Objective:   /77   Pulse 74   Temp 98.2  F (36.8  C)   Resp 18   Ht 1.626 m (5' 4\")   Wt 48.4 kg (106 lb 12.8 oz)   LMP  (LMP Unknown)   SpO2 95%   BMI 18.33 kg/m      GENERAL APPEARANCE: Laying in bed comfortably, NAD  HENT:  Mild temporal atrophy, severely [place], poor dentition  PULM  Normal WOB on RA, lungs CTAB, no wheezes or crackles  CV:  RRR, S1/S2 normal, no murmurs; bilater LE edema  ABDOMEN: Abdomen soft, not tender, not distended, BS normal and active throughout   M/S:   Severe kyphosis  SKIN: Venous stasis changes of bilateral LEs  NEURO: " Alert,  circuitous thought processes and repetitive. Else CN II-XII grossly intact  PSYCH: Mood normal with bright affect; insight impaired    Assessment/Plan:      ICD-10-CM    1. Hospice care patient  Z51.5       2. Endometrial carcinoma (H)  C54.1       3. Moderate late onset Alzheimer's dementia with anxiety (H)  G30.1     F02.B4       4. Protein-calorie malnutrition, unspecified severity  E46       5. Urinary incontinence, mixed  N39.46         Continues to do well with supportive cares and long-term care.  Is happy with her current cares including additional care she receives through hospice.  No reported recent episodes of vaginal bleeding, she does not recall any.  Her cognitive impairment is significant, does seem to be progressing and possibly entering a more severe stage.  Mood seems to be well-controlled with current regimen.  Her weights fluctuate, relatively stable, but generally downtrending.  Incontinence control seems to be modestly improved with Myrbetriq.  No changes to current plan.  Will continue supportive cares along with hospice cares.    MED REC REQUIRED  Post Medication Reconciliation Status: medication reconcilation previously completed during another office visit    Orders:  -NNO  Electronically signed by:     Benjamin Rosenstein, MD, MA  [facility]     This note was completed with the assistance of dictation software. Typos and word substitution-errors are expected and unintended.      32 MINUTES SPENT BY ME on the date of service doing chart review, history, exam, documentation & further activities per the note.

## 2025-02-10 NOTE — PROGRESS NOTES
Attempted to reach patient regarding urine culture. LVM, requested return call to ER pharmacy.    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     Hospital complications:    #Stage IIIA (T2 N1) SCC of anus  -Followed by Dr. Ray  -S/p 1 cycle 5-FU + mitomycin (D1 2/12) followed by 1 cycle single-agent 5-FU (d/t toxicity and age, D29 3/11) with concurrent radiation  -Completed radiation 3/23  -Supportive rectal cares ordered  -Intermittently expressing feelings of hopelessness and feeling at peace with dying, not wanting to continue with treatment. Consulted palliative care SW for assistance with illness coping and decision making, appreciate their assistance. Feeling more hopeful now that she has completed XRT, will continue supportive cares, she has f/u appt with Dr. Ray in ~2 weeks.    #Hypoxemia  #Hydropneumothorax, right side, likely secondary to fractured ribs  #Hydrothorax, left side  recurrence, now on 2-3 L NC likely 2/2 to volume overload/pulmonary edema & b/l Pleural effusions  Developed overnight 3/23-3/24. Hypoalbuminemic with signs of third spacing. Suspect secondary to fluid overload. S/P farias placement. CXR 3/24 10 am with small bilateral layering pleural effusions with overlying atelectasis/consolidation.   - lasix 20 mg BID on 3/24-25, now d/c as dizzy with ambulation & respiratory status improved.  - Recurrence of hypoxia 3/27; CT of chest confirms hydropneumothorax of right lung likely secondary to rib fractures.  Pneumo is small and likely thoracentesis/chest tube would not address this completely, and due to albumin and fluid status, pleural effusions would likely re accumulate quickly. Monitor for now.   - CXR today- Trace right apical pneumothorax. Moderate layering bilateral pleural effusions with bilateral lower lobes and  left retrocardiac opacities, atelectasis versus  Consolidation.  - Oxygen demands stable today, did have some intermittent CP overnight 3/27, but EKG WNL. Monitor.      Malignancy/Treatment related (chemotherapy/XRT) complications:    #Intractable diarrhea  #Nausea  #Dehydration  #Electrolyte derangements  #Small bowel obstruction  -Suspect nausea, diarrhea secondary to mucositis from recent chemotherapy (5-FU) and XRT. C diff & enteric panel were negative.   - AXR 3/27 for NG tube confirmation noted concern for possible SBO; CT CAP 3/28 shows SBO; Continue to hold tube feeds, TPN instead per below.   - Previously scheduled Lomotil and octreotide 100mg TID SQ while inpatient, will see if this can be continued at TCU. Imodium available PRN. Discontinue all antidiarrheals are on hold given concern for SBO. (FYI pt had confusion with tincture of opium).  - Likely her anal sphincter tone is compromised, and incontinence will continue to be an issue.    -Antiemetics also available (Zofran, Compazine). No vomiting since admission.  -High lyte SS.  - Clear liquid diet, as tolerated    #Perianal wound, secondary to radiation  - Pain controlled with tylenol and tramadol PRN   - Appreciate [facility] assistance in management of this wound  - Continue TPN given concern for compromised wound healing after XRT with malnutrition    #Pancytopenia  -Secondary to underlying disease, recent chemo/XRT  -Transfuse for Hgb <8, PLT <10K - no transfusion needs thus far    #Weakness  #Fall at home  #Fractured R 8th, 9th and 10th rib  -Suspect weakness, falls secondary to above issues.  -Fall precautions  - Has known pneumothorax, likely from this; Monitor.   -PT/OT --> encouraged patient to work with them, she is very motivated but struggling with pain and weakness.   -Also has Tylenol, ketamine/gabapentin/lidocaine and diclofenac available topically, which she finds helpful, so will continue.    #Malnutrition, in context of acute on chronic  illness  - % Intake: </= 50% for >/= 5 days (severe); Subcutaneous Fat Loss: Facial region: Moderate, Upper arm and Thoracic/intercostal: Severe   - Calorie counts started 3/22, minimal intake, not meeting nutritional needs  - Suspect lack of good PO intake is contributing to impaired wound healing   - NG tube placement 3/27 with plan for tube feedings, however found to have SBO.   - Continue TPN (3/29-present). Labs Qdaily.     #Coagulopathy r/t severe malnutrition & infection (UTI)  -Suspect secondary to malnutrition. Dosing daily with vitamin K PRN.  -Got PO vitamin K 5 mg (3/19, 3/20, 3/21, 2x on 3/22, 3/23 and 3/27 (for INR of 1.87) none since  -INR 1/44 today   - Hopefully starting nutrition will help with this.     #UTI (Klebsiella pneumoniae & proteus mirabilis)  -Continue Cipro (x3/21) 500mg BID, plan for 10 days of therapy (x3/21-3/30).  -Repeat UA continues to be dirty with hyaline casts, didn't reflex to culture.    #Altered mental status >> improved/resolved likely r/t MEDS 3/25 AM  -Nonsensical statements this morning, disoriented. 3/24 AM.  -Was getting concurrent oxycodone/opium tincture. Will now hold.  -Hypoxia 2/2 to volume overload.  -Blood culture: NTD  -Urine culture: 3/19 with klebsiella PNA on ciprofloxacin  -CXR with pleural effusions.     PAIN # Pain Assessment:  Current Pain Score 3/30/2018   Patient currently in pain? yes   Pain score (0-10) -   Pain location Abdomen   Pain descriptors Pressure   - Elizabeth is experiencing pain due to fractured ribs, radiation treatment. Pain management was discussed and the plan was created in a collaborative fashion.  Elizabeth's response to the current recommendations: engaged  compliant  - Opioid regimen: tramadol and tylenol PRN (oxycodone and opiod tincture caused ANS)   - Pharmacologic adjuvants: topical diclofenac gel, topical ketamine/gabapentin/lidocaine cream   FEN - Fluid bolus PRN  - Start TPN (3/29)  - continue farias placement   PPX (VTE & GI) -  lovenox SQ 40 mg daily (plt 114)  - Bowel regimen: not needed- having diarrhea   LINES & DRAINS & WOUNDS - Port  - Joiner  - Wound on sacrum   CONSULTS - OT, PT, WOC, nutrition   CODE - FULL   DISPO - Anticipate d/c to TCU  4/2 or 4/3 on TPN days pending tolerance to TPN and control of pain.      Above plan discussed with staff physician, Dr. Argueta.     Rosa Gonzalez, [facility]-BC  Hematology/Oncology  #[id number]      Interval history  Met with patient, She reports she feels much better this AM, but had a rough night. She has increased distention in her abdomen, and painful wound care overnight.  She states that the tramadol does cover her pain and she does not want any other pain medication. She was incontinent of stool x 1 overnight. She finds her breathing is stable, no further chest pain. She denies fevers or chills.  No fevers or chills.     Physical Exam  Constitutional: Awake, alert, cooperative, in NAD. Elderly/frail and cachectic.   Eyes: PERRL, EOMI, sclera clear, conjunctiva normal.  ENT: Normocephalic, without obvious abnormality, moist mucus membranes, no lesions or thrush. NG tube in place.   Respiratory: Non-labored breathing, absent in bases, no wheezing, no rales.   Cardiovascular: RRR, no murmur noted.  GI: +BS, soft, non-distended, mild diffuse tenderness, improved from yesteday.  Skin: No concerning lesions or rash on exposed areas.  Musculoskeletal: 1-2+ pitting edema to BL LE, wrapped.   Neurologic: Awake, A&O x 3. Cranial nerves II-XII are grossly intact.   Neuropsychiatric: Calm, normal affect, not confused, memory intact, judgement/insight intact.     Rounding:  Temp:  [95  F (35  C)-98.5  F (36.9  C)] 97.7  F (36.5  C)  Pulse:  [78-80] 80  Heart Rate:  [77-82] 79  Resp:  [14-18] 14  BP: ()/(53-57) 106/54  SpO2:  [93 %-99 %] 97 %    I/O last 3 completed shifts:  In: 1506.92 [P.O.:480; I.V.:425]  Out: 725 [Urine:725]    Vitals:    03/20/18 0744 03/21/18 2030 03/23/18 1652 03/29/18 0900   Weight:  57.6 kg (126 lb 14.4 oz) 60.5 kg (133 lb 4.8 oz) 62.9 kg (138 lb 11.2 oz) 58.4 kg (128 lb 12.8 oz)       Recent Labs  Lab 03/30/18  0646 03/29/18  0633 03/28/18  0556 03/27/18  0624   WBC 5.6 5.9 6.0 4.3   RBC 3.43* 3.66* 3.59* 3.57*   HGB 10.3* 11.1* 10.9* 10.8*   HCT 31.8* 34.3* 34.1* 33.5*   MCV 93 94 95 94   MCH 30.0 30.3 30.4 30.3   MCHC 32.4 32.4 32.0 32.2   RDW 15.0 14.9 15.0 14.9    212 197 165       Recent Labs  Lab 03/30/18  0646 03/29/18  0633 03/28/18  0556 03/27/18  0624    137 136 135   POTASSIUM 3.7 3.6 4.2 4.4   CHLORIDE 102 101 102 101   CO2 30 30 28 30   ANIONGAP 5 6 6 4   * 49* 71 88   BUN 9 7 8 8   CR 0.52 0.62 0.59 0.58   GFRESTIMATED >90 >90 >90 >90   GFRESTBLACK >90 >90 >90 >90   ELISE 7.7* 7.6* 7.5* 7.6*   MAG 1.9 2.1 2.0 2.0   PHOS 2.0* 3.2 2.6 3.7   PROTTOTAL 3.8* 4.0* 4.0* 4.1*   ALBUMIN 0.9* 1.1* 1.0* 1.1*   BILITOTAL 0.8 0.5 0.6 0.6   ALKPHOS 75 83 81 83   AST 22 19 14 12   ALT 14 15 14 14     Recent Results (from the past 24 hour(s))   XR Chest Port 1 View    Narrative    Exam: Chest x-ray, 1 view, 3/29/2018.    COMPARISON: CT exam 3/28/2018 and chest x-ray 3/24/2018.    FINDINGS: AP portable view of the chest was obtained. Trace right  apical pneumothorax. Moderate layering bilateral pleural effusions.  Bilateral lower lobes and left retrocardiac opacities.  Cardiomediastinal silhouette is not enlarged. Tortuous thoracic aorta  with calcifications. Right IJ Port-A-Cath with its tip projecting over  the high right atrium. Degenerative changes of the spine and  shoulders.      Impression    IMPRESSION:  1. Trace right apical pneumothorax.  2. Moderate layering bilateral pleural effusions with bilateral lower  lobes and left retrocardiac opacities, atelectasis versus  consolidation.    EDWARD PRIETO MD     Anti-infectives (Future)    Start     Dose/Rate Route Frequency Ordered Stop    03/21/18 0830  ciprofloxacin (CIPRO) tablet 500 mg      500 mg Oral EVERY 12  HOURS SCHEDULED 03/21/18 0821 03/30/18 2829          lipids  250 mL Intravenous Once per day on Mon Tue Wed Thu Fri     heparin lock flush  5-10 mL Intracatheter Q24H     heparin  5 mL Intracatheter Q28 Days     calcium-vitamin D  1 tablet Oral Daily     multivitamin, therapeutic  1 tablet Oral Daily     ciprofloxacin  500 mg Oral Q12H OSCAR     hydrocortisone   Rectal BID     vitamin B complex with vitamin C  1 tablet Oral Daily     enoxaparin  40 mg Subcutaneous Q24H       parenteral nutrition - ADULT compounded formula       IV fluid REPLACEMENT ONLY       parenteral nutrition - ADULT compounded formula 40 mL/hr at 03/30/18 0050     - MEDICATION INSTRUCTIONS -

## 2025-02-10 NOTE — LETTER
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     History limited due to her cognitive impairment.  Overall doing well without concerns.  Notes she is not feeling any pain.  Is happy that she is feeling quite well.  Appetite is doing well.  Is happy with her care.  Fairly repetitive with these thoughts.    NH ROS  Nursing concerns: None.    Appetite: Normal.    Weight changes: Weights fluctuate, relatively stable between 107-110lb  Bowel: Incontinent.    Bladder: Incontinent.    Skin: Venous stasis changes of LEs, no acute concerns. Wound of left knee from Dec healling   Sleep: Sleeping well.    Mood: Anxious at times.    Cognition: Impaired, last BIMS 10/15  Behavior: No concerns.    Mobility: Ambulates independently with 4ww.    ADL assistance: Assist with hygiene, toileting, bathing.    Pain:None .  Falls: Last fall 12/6/24. History of frequent falls  Resp: Negative.  Cardio: Negative  Ethics: DNR/I, comfort focused    Allergies, and PMH/PSH reviewed in [facility] today.  REVIEW OF SYSTEMS:    Objective:   /77   Pulse 74   Temp 98.2  F (36.8  C)   Resp 18   Ht 1.626 m (5' 4\")   Wt 48.4 kg (106 lb 12.8 oz)   LMP  (LMP Unknown)   SpO2 95%   BMI 18.33 kg/m      GENERAL APPEARANCE: Laying in bed comfortably, NAD  HENT:  Mild temporal atrophy, severely [place] poor dentition  PULM  Normal WOB on RA, lungs CTAB, no wheezes or crackles  CV:  RRR, S1/S2 normal, no murmurs; bilater LE edema  ABDOMEN: Abdomen soft, not tender, not distended, BS normal " and active throughout   M/S:   Severe kyphosis  SKIN: Venous stasis changes of bilateral LEs  NEURO: Alert,  circuitous thought processes and repetitive. Else CN II-XII grossly intact  PSYCH: Mood normal with bright affect; insight impaired    Assessment/Plan:      ICD-10-CM    1. Hospice care patient  Z51.5       2. Endometrial carcinoma (H)  C54.1       3. Moderate late onset Alzheimer's dementia with anxiety (H)  G30.1     F02.B4       4. Protein-calorie malnutrition, unspecified severity  E46       5. Urinary incontinence, mixed  N39.46         Continues to do well with supportive cares and long-term care.  Is happy with her current cares including additional care she receives through hospice.  No reported recent episodes of vaginal bleeding, she does not recall any.  Her cognitive impairment is significant, does seem to be progressing and possibly entering a more severe stage.  Mood seems to be well-controlled with current regimen.  Her weights fluctuate, relatively stable, but generally downtrending.  Incontinence control seems to be modestly improved with Myrbetriq.  No changes to current plan.  Will continue supportive cares along with hospice cares.    MED REC REQUIRED  Post Medication Reconciliation Status: medication reconcilation previously completed during another office visit    Orders:  -NNO  Electronically signed by:     Benjamin Rosenstein, MD, MA  [facility]     This note was completed with the assistance of dictation software. Typos and word substitution-errors are expected and unintended.      32 MINUTES SPENT BY ME on the date of service doing chart review, history, exam, documentation & further activities per the note.          Sincerely,        Benjamin Rosenstein, MD    Electronically signed

## 2025-03-23 ENCOUNTER — HEALTH MAINTENANCE LETTER (OUTPATIENT)
Age: 85
End: 2025-03-23

## 2025-04-14 ENCOUNTER — ASSISTED LIVING VISIT (OUTPATIENT)
Dept: GERIATRICS | Facility: CLINIC | Age: 85
End: 2025-04-14
Payer: OTHER MISCELLANEOUS

## 2025-04-14 VITALS
DIASTOLIC BLOOD PRESSURE: 75 MMHG | OXYGEN SATURATION: 96 % | SYSTOLIC BLOOD PRESSURE: 136 MMHG | RESPIRATION RATE: 18 BRPM | TEMPERATURE: 98.8 F | HEIGHT: 64 IN | WEIGHT: 111.4 LBS | BODY MASS INDEX: 19.02 KG/M2 | HEART RATE: 85 BPM

## 2025-04-14 DIAGNOSIS — G89.4 CHRONIC PAIN SYNDROME: ICD-10-CM

## 2025-04-14 DIAGNOSIS — F02.B4 MODERATE LATE ONSET ALZHEIMER'S DEMENTIA WITH ANXIETY (H): ICD-10-CM

## 2025-04-14 DIAGNOSIS — E46 PROTEIN-CALORIE MALNUTRITION, UNSPECIFIED SEVERITY: ICD-10-CM

## 2025-04-14 DIAGNOSIS — N85.00 ENDOMETRIAL HYPERPLASIA: ICD-10-CM

## 2025-04-14 DIAGNOSIS — M81.0 AGE-RELATED OSTEOPOROSIS WITHOUT CURRENT PATHOLOGICAL FRACTURE: ICD-10-CM

## 2025-04-14 DIAGNOSIS — G30.1 MODERATE LATE ONSET ALZHEIMER'S DEMENTIA WITH ANXIETY (H): ICD-10-CM

## 2025-04-14 DIAGNOSIS — M47.817 LUMBOSACRAL SPONDYLOSIS WITHOUT MYELOPATHY: ICD-10-CM

## 2025-04-14 DIAGNOSIS — R35.0 URINARY FREQUENCY: ICD-10-CM

## 2025-04-14 DIAGNOSIS — E03.9 HYPOTHYROIDISM, UNSPECIFIED TYPE: ICD-10-CM

## 2025-04-14 DIAGNOSIS — M41.9 KYPHOSCOLIOSIS: ICD-10-CM

## 2025-04-14 DIAGNOSIS — Z87.81 HISTORY OF FRACTURE OF RIGHT HIP: ICD-10-CM

## 2025-04-14 DIAGNOSIS — Z51.5 HOSPICE CARE PATIENT: Primary | ICD-10-CM

## 2025-04-14 DIAGNOSIS — N95.0 POSTMENOPAUSAL BLEEDING: ICD-10-CM

## 2025-04-14 DIAGNOSIS — C54.1 ENDOMETRIAL CARCINOMA (H): ICD-10-CM

## 2025-04-14 DIAGNOSIS — I87.2 VENOUS STASIS DERMATITIS OF BOTH LOWER EXTREMITIES: ICD-10-CM

## 2025-04-14 DIAGNOSIS — N39.46 URINARY INCONTINENCE, MIXED: ICD-10-CM

## 2025-04-14 PROCEDURE — 99349 HOME/RES VST EST MOD MDM 40: CPT | Mod: GV

## 2025-04-14 NOTE — LETTER
" 4/14/2025      Elizabeth Taylor  [facility]   [address]  [place] [address]        [facility] GERIATRICS    Chief Complaint   Patient presents with     Assisted Living Regulatory      HPI:  Elizabeth Taylor is a 84 year old  (1940), who is being seen today for an episodic care visit at: [facility] ([facility]) [[id number]]. HPI information obtained from: facility chart records, facility staff, patient report and [facility] chart review. PMH: MCI, osteoporosis, anal cancer, hypothyroidism, and frequent falls. She has been a resident at this facility since 07/2021. Recently diagnosed with endometrial carcinoma and signed on to hospice in May 2024.     Today:  Nursing staff reports no acute medical concerns.     Patient is seen today for a visit in her room. She is partially sitting up eating lunch. Feels \"good\". \"No\" pain; notes, \"I have plenty of medicine so I don't have pain\". Appetite is good; notes, \"The food is delicious\".  Unable to recall last BM but \"no\" belly pain. \"No\" concerns with bladder. Sleeping \"nice\". \"No\" depression or SI. Denies CP, palpitations, lightheadedness, dizziness, fatigue, SOB, fever, chills, nausea/vomiting concerns today.      Allergies, and PMH/PSH reviewed in [facility] today.  REVIEW OF SYSTEMS:  Limited due to cognitive impairment but today reports 4 point ROS including Respiratory, CV, GI and , other than that noted in the HPI,  is negative    Objective:   /75   Pulse 85   Temp 98.8  F (37.1  C)   Resp 18   Ht 1.626 m (5' 4\")   Wt 50.5 kg (111 lb 6.4 oz)   LMP  (LMP Unknown)   SpO2 96%   BMI 19.12 kg/m    GENERAL APPEARANCE:  Alert, elderly, in no distress  HEENT:  atraumatic, [place], EOM intact, moist mucus membranes  RESP:  non-labored breathing, no respiratory distress, no cough  CV:  Rate regular, S1 S2 noted, no edema  ABDOMEN:  soft, non-distended, non-tender, bowel sounds active  M/S:   wheelchair bound, strength and tone equal " bilaterally, no calf pain, arthritic changes noted in hands  SKIN:  warm, dry, thin, fragile; hemosiderin staining on BLE  NEURO:   Face is symmetric, examination of sensation by touch normal, follows and tracks, slow speech, memory impaired  PSYCH:  calm, cooperative        Labs reviewed as per Epic and/or Care Everywhere.      Assessment/Plan:  Hospice care patient   Endometrial carcinoma (H)   Endometrial hyperplasia   Postmenopausal bleeding   Postmenopausal bleeding episode and 3/2024 prompted further evaluation resulting in new diagnosis of cancer of uterus. Met with oncology in May; noted recovery from surgery will be difficult due to current physical limitations. Started on Megace on 5/23/24 to help with vaginal bleeding. 3/25/25, has cancer warts on her elfego area per hospice assessment. Today, no concerns reported.   -Morphine and lorazepam as needed  -Continue Comfort focused care  -Continue Megace 80 mg twice daily  -Appreciate cares and recommendations from hospice team     Urinary frequency   Urinary incontinence, mixed   Ongoing. Using incontinence briefs.   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 Resides in [facility] for supportive cares. Started on Celexa in 11/2024. Today, no concerns.  -continue Celexa 20 mg daily  -continue 24/7 nursing and supportive cares  -monitor mood and behaviors     Age-related osteoporosis without current pathological fracture   History of fracture of right hip   Lumbosacral spondylosis without myelopathy   Chronic pain syndrome   Hx of fractures in left humerus, distal radius, and right hip. Lumbar spondylosis noted in 2021. Today, feels no pain.  -continue to WBAT and activities as tolerated  -morphine as noted above  -continue APAP 1000 mg TID  -continue gabapentin 100 mg TID  -monitor effectiveness     Hypothyroidism, unspecified type  Last TSH 5.68 (11/17/23).  -continue  levothyroxine 50 mcg daily  -no labs recommended; hospice patient     Kyphoscoliosis  Chronic.  -continue lumbar brace for support     Protein-calorie malnutrition, unspecified severity (H24)  Weight now 111# (4/4); was 114# (12/1/24)  -continue 4 ounce nutritional shake daily  -encourage healthy food intake  -follow weights     Venous stasis dermatitis of both lower extremities   Chronic. BLE with hemosiderin staining.  stockings.  -continue aquaphor to legs twice daily  -continue routine skin checks    Open wound of left knee, leg, and ankle, sequela   Resolved. Treated Keflex by hospice team on 12/12/24. Today, no open wounds noted.  -continue routine skin checks      MED REC REQUIRED  Post Medication Reconciliation Status: patient was not discharged from an inpatient facility or TCU      35 minutes spent on the date of this encounter doing chart review, review labs, discussion with nursing staff, patient visit, and documentation.     Electronically signed by: Dr. Jada Martinez, DNP, APRN, AGNP-BC, PHN    This note was completed with the assistance of dictation software. Typos and word substitution-errors are expected and unintended.              Sincerely,        Jada Martinez, GRAHAM CNP    Electronically signed

## 2025-04-14 NOTE — PROGRESS NOTES
"[facility] GERIATRICS    Chief Complaint   Patient presents with    Assisted Living Regulatory      HPI:  Elizabeth Taylor is a 84 year old  (1940), who is being seen today for an episodic care visit at: [facility] ([facility]) [[id number]]. HPI information obtained from: facility chart records, facility staff, patient report and [facility] chart review. PMH: MCI, osteoporosis, anal cancer, hypothyroidism, and frequent falls. She has been a resident at this facility since 07/2021. Recently diagnosed with endometrial carcinoma and signed on to hospice in May 2024.     Today:  Nursing staff reports no acute medical concerns.     Patient is seen today for a visit in her room. She is partially sitting up eating lunch. Feels \"good\". \"No\" pain; notes, \"I have plenty of medicine so I don't have pain\". Appetite is good; notes, \"The food is delicious\".  Unable to recall last BM but \"no\" belly pain. \"No\" concerns with bladder. Sleeping \"nice\". \"No\" depression or SI. Denies CP, palpitations, lightheadedness, dizziness, fatigue, SOB, fever, chills, nausea/vomiting concerns today.      Allergies, and PMH/PSH reviewed in [facility] today.  REVIEW OF SYSTEMS:  Limited due to cognitive impairment but today reports 4 point ROS including Respiratory, CV, GI and , other than that noted in the HPI,  is negative    Objective:   /75   Pulse 85   Temp 98.8  F (37.1  C)   Resp 18   Ht 1.626 m (5' 4\")   Wt 50.5 kg (111 lb 6.4 oz)   LMP  (LMP Unknown)   SpO2 96%   BMI 19.12 kg/m    GENERAL APPEARANCE:  Alert, elderly, in no distress  HEENT:  atraumatic, [place], EOM intact, moist mucus membranes  RESP:  non-labored breathing, no respiratory distress, no cough  CV:  Rate regular, S1 S2 noted, no edema  ABDOMEN:  soft, non-distended, non-tender, bowel sounds active  M/S:   wheelchair bound, strength and tone equal bilaterally, no calf pain, arthritic changes noted in hands  SKIN:  warm, dry, thin, fragile; hemosiderin " staining on BLE  NEURO:   Face is symmetric, examination of sensation by touch normal, follows and tracks, slow speech, memory impaired  PSYCH:  calm, cooperative        Labs reviewed as per Epic and/or Care Everywhere.      Assessment/Plan:  Hospice care patient   Endometrial carcinoma (H)   Endometrial hyperplasia   Postmenopausal bleeding   Postmenopausal bleeding episode and 3/2024 prompted further evaluation resulting in new diagnosis of cancer of uterus. Met with oncology in May; noted recovery from surgery will be difficult due to current physical limitations. Started on Megace on 5/23/24 to help with vaginal bleeding. 3/25/25, has cancer warts on her elfego area per hospice assessment. Today, no concerns reported.   -Morphine and lorazepam as needed  -Continue Comfort focused care  -Continue Megace 80 mg twice daily  -Appreciate cares and recommendations from hospice team     Urinary frequency   Urinary incontinence, mixed   Ongoing. Using incontinence briefs.   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 Resides in [facility] for supportive cares. Started on Celexa in 11/2024. Today, no concerns.  -continue Celexa 20 mg daily  -continue 24/7 nursing and supportive cares  -monitor mood and behaviors     Age-related osteoporosis without current pathological fracture   History of fracture of right hip   Lumbosacral spondylosis without myelopathy   Chronic pain syndrome   Hx of fractures in left humerus, distal radius, and right hip. Lumbar spondylosis noted in 2021. Today, feels no pain.  -continue to WBAT and activities as tolerated  -morphine as noted above  -continue APAP 1000 mg TID  -continue gabapentin 100 mg TID  -monitor effectiveness     Hypothyroidism, unspecified type  Last TSH 5.68 (11/17/23).  -continue levothyroxine 50 mcg daily  -no labs recommended; hospice patient     Kyphoscoliosis  Chronic.  -continue lumbar brace  for support     Protein-calorie malnutrition, unspecified severity (H24)  Weight now 111# (4/4); was 114# (12/1/24)  -continue 4 ounce nutritional shake daily  -encourage healthy food intake  -follow weights     Venous stasis dermatitis of both lower extremities   Chronic. BLE with hemosiderin staining.  stockings.  -continue aquaphor to legs twice daily  -continue routine skin checks    Open wound of left knee, leg, and ankle, sequela   Resolved. Treated Keflex by hospice team on 12/12/24. Today, no open wounds noted.  -continue routine skin checks      MED REC REQUIRED  Post Medication Reconciliation Status: patient was not discharged from an inpatient facility or TCU      35 minutes spent on the date of this encounter doing chart review, review labs, discussion with nursing staff, patient visit, and documentation.     Electronically signed by: Dr. Jada Martinez, DNP, APRN, AGNP-BC, PHN    This note was completed with the assistance of dictation software. Typos and word substitution-errors are expected and unintended.

## 2025-04-16 PROBLEM — C21.1 MALIGNANT NEOPLASM OF ANAL CANAL (H): Status: RESOLVED | Noted: 2018-01-12 | Resolved: 2025-04-16

## 2025-04-16 RX ORDER — CITALOPRAM HYDROBROMIDE 20 MG/1
20 TABLET ORAL DAILY
COMMUNITY

## 2025-04-26 DIAGNOSIS — E58 DIETARY CALCIUM DEFICIENCY: Primary | ICD-10-CM

## 2025-05-29 ENCOUNTER — PATIENT OUTREACH (OUTPATIENT)
Dept: GERIATRIC MEDICINE | Facility: CLINIC | Age: 85
End: 2025-05-29
Payer: COMMERCIAL

## 2025-05-29 NOTE — Clinical Note
Mario Elias, hope you are well!  I am covering for Natividad Thornton right now.  I had a nice visit with Elizabeth, she was very sleepy so participated minimally but sounds like she is doing well.  If there is anything I should know or can do please let me know, thanks!  QUYNH Coronel, [facility] [phone]

## 2025-05-31 NOTE — PROGRESS NOTES
[facility] Care Coordination Contact  [facility] Institutional Assessment     Institutional Assessment for Health Risk Assessment with Elizabeth Taylor completed on May 29, 2025 at [facility]    Type of residence:: Nursing home ([facility])  Current living arrangement:: I live in a nursing home     Assessment completed with:: Patient, Care Team Member    Mental/Behavioral Health   Depression Screening:   PHQ-2 Total Score (Adult) - Positive if 3 or more points; Administer PHQ-9 if positive: 0     Mental health DX:: Yes   Mental health DX how managed:: Medication    Falls Assessment:   Fallen 2 or more times in the past year?: No   Any fall with injury in the past year?: No    ADL/IADL Dependencies:   Dependent ADLs:: Bathing, Dressing, Grooming, Incontinence, Positioning, Transfers, Wheelchair-with assist, Toileting  Dependent IADLs:: Cleaning, Cooking, Laundry, Shopping, Meal Preparation, Medication Management, Money Management, Transportation, Incontinence    Care Plan & Recommendations: CC met with Elizabeth in her room on this date.  She was sleeping when CC arrived but stirred and participated minimally in assessment.  She said she was doing well, was tired, but doing well.  Per chart review Elizabeth has been on hospice since May 2024.  No concerns shared by Elizabeth on this date.  Discussed options/opportunities for transitions.    See Institutional Care Plan for detailed assessment information.    Obtained a copy of the facility care plan and MDS from facility electronic records. Requested of detention social worker to put this care coordinator on care conference attendee list.    Placed the Health Plan facility face sheet in the member's facility chart.    Follow-Up Plan: Member informed of future contact, plan to f/u with member with a 6 month assessment, attend 1 care conference annually, and will follow any hospitalizations or transitions. Care Coordinator contact  information shared with member/family and facility, and encouraged to call this care coordinator with any questions or concerns at any time.     [place] care continuum providers: Please see Snapshot and Care Management Flowsheets for Specific details of care plan.    This CC note routed to PCP, Jada Martinez LSW, [facility]  [phone]

## 2025-06-16 ENCOUNTER — PATIENT OUTREACH (OUTPATIENT)
Dept: GERIATRIC MEDICINE | Facility: CLINIC | Age: 85
End: 2025-06-16

## 2025-06-16 ENCOUNTER — LAB REQUISITION (OUTPATIENT)
Dept: LAB | Facility: CLINIC | Age: 85
End: 2025-06-16
Payer: OTHER MISCELLANEOUS

## 2025-06-16 ENCOUNTER — ASSISTED LIVING VISIT (OUTPATIENT)
Dept: GERIATRICS | Facility: CLINIC | Age: 85
End: 2025-06-16
Payer: OTHER MISCELLANEOUS

## 2025-06-16 VITALS
HEIGHT: 64 IN | HEART RATE: 92 BPM | SYSTOLIC BLOOD PRESSURE: 118 MMHG | RESPIRATION RATE: 16 BRPM | BODY MASS INDEX: 19.33 KG/M2 | DIASTOLIC BLOOD PRESSURE: 76 MMHG | TEMPERATURE: 98.5 F | OXYGEN SATURATION: 97 % | WEIGHT: 113.2 LBS

## 2025-06-16 DIAGNOSIS — K08.9 POOR DENTITION: ICD-10-CM

## 2025-06-16 DIAGNOSIS — I87.2 VENOUS STASIS DERMATITIS OF BOTH LOWER EXTREMITIES: ICD-10-CM

## 2025-06-16 DIAGNOSIS — F02.C4 SEVERE LATE ONSET ALZHEIMER'S DEMENTIA WITH ANXIETY (H): ICD-10-CM

## 2025-06-16 DIAGNOSIS — E03.9 HYPOTHYROIDISM, UNSPECIFIED TYPE: ICD-10-CM

## 2025-06-16 DIAGNOSIS — R29.6 FALLS FREQUENTLY: ICD-10-CM

## 2025-06-16 DIAGNOSIS — I83.93 ASYMPTOMATIC VARICOSE VEINS OF BOTH LOWER EXTREMITIES: ICD-10-CM

## 2025-06-16 DIAGNOSIS — M41.9 KYPHOSCOLIOSIS: ICD-10-CM

## 2025-06-16 DIAGNOSIS — G30.1 SEVERE LATE ONSET ALZHEIMER'S DEMENTIA WITH ANXIETY (H): ICD-10-CM

## 2025-06-16 DIAGNOSIS — Z51.5 HOSPICE CARE PATIENT: Primary | ICD-10-CM

## 2025-06-16 DIAGNOSIS — C54.1 ENDOMETRIAL CARCINOMA (H): ICD-10-CM

## 2025-06-16 DIAGNOSIS — R94.6 ABNORMAL RESULTS OF THYROID FUNCTION STUDIES: ICD-10-CM

## 2025-06-16 PROBLEM — G31.84 MILD COGNITIVE IMPAIRMENT: Status: RESOLVED | Noted: 2021-07-26 | Resolved: 2025-06-16
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
## 2025-06-16 PROBLEM — R41.3 MEMORY IMPAIRMENT: Status: RESOLVED | Noted: 2023-09-23 | Resolved: 2025-06-16

## 2025-06-16 PROBLEM — R19.7 DIARRHEA: Status: RESOLVED | Noted: 2018-03-19 | Resolved: 2025-06-16

## 2025-06-16 PROCEDURE — 99348 HOME/RES VST EST LOW MDM 30: CPT | Mod: GV | Performed by: FAMILY MEDICINE

## 2025-06-16 NOTE — PROGRESS NOTES
[facility] Care Coordination Contact    Received after visit chart from care coordinator.  Completed following tasks: Mailed [employer] Leave Behind Letter and Mailed [employer] Post Visit letter to member/rep and NH facility    Elizabeth Agustin  Case Management Specialist   [facility]  [phone]

## 2025-06-16 NOTE — LETTER
June 16, 2025    Important [employer] Information    GERHARD SU  [facility]   [address]   [place] [address]                                                                              Your Care Plan      Dear Gerhard,    I am your [employer] Care Coordinator and I visited you at [facility]  on 5/29/25 to complete your [employer] Health Assessment.    [x] I reviewed your Facility Care Plan and assessment and all identified needs have goals present    [] I reviewed your Facility Care Plan and assessment and we identified these additional goal(s):                                                                                                                                                                                                                                               I will plan to follow up:  [] Once a month  [] Every 3 months   [x] Every 6 months  [] Other      Questions?  If you have any questions or want to discuss your health care needs, call me at [phone] Monday-Friday between 8am and 5pm. TTY: [phone].     Sincerely,    QUYNH Coronel, [facility]    E-mail: [email or web address]  Phone: [phone]      [facility]      cc: member record, Skilled Nursing Facility    [employer]  is an HMO D-SNP that contracts with both Medicare and the [place] Medical Assistance (Medicaid) program to provide benefits of both programs to enrollees. Enrollment in [employer] depends on contract renewal.  [id number]    2023 [employer]

## 2025-06-16 NOTE — LETTER
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     Seen in her room today.  She is generally feeling pretty good.  Flowers recently planted on the window banister outside her room and she quite enjoys standing up to see them.  She is repetitive about this throughout her visit today.  Generally her mood is doing well.  Feels well supported by her care here.  Does note she requires frequent continence cares.  She is not having any pain.    She continues to engage in activities at the facility frequently.  Fortunately no recent falls.  Weights have remained stable to improved approximately 113 pounds.  She did see dentistry 4/29/2025 for routine cleaning.  Looking into interim partial dentures.    Last BIMS 4/15 significantly decreased from prior, overall downward trend.    Objective:   /76   Pulse 92   Temp 98.5  F (36.9  C)   Resp 16   Ht 1.626 m (5' 4\")   Wt 51.3 kg (113 lb 3.2 oz)   LMP  (LMP Unknown)   SpO2 97%   BMI 19.43 kg/m      GENERAL APPEARANCE: Seated comfortably, NAD  HENT:  NCAT, moderately [place], poor dentition  EYES:  Conjunctiva clear, anicteric, EOMI  PULM  Normal WOB on RA, lungs CTAB, no wheezes or crackles  CV:  RRR, S1/S2 normal, no murmurs; bilateral LE edema  ABDOMEN: Abdomen soft, not tender, not distended, no organomegaly, BS normal and active throughout   M/S:  Kyphoscoliosis  SKIN: Significant hyperemia of bilateral LEs with venous stasis changes of bilateral LEs, prominent varicose veins L > R without tenderness to " palpation  NEURO: Alert, tangential, significant cognitive impairment; repetitive with poor attention  PSYCH: Mood normal with bright affect    Assessment/Plan:    (Z51.5) Hospice care patient  (primary encounter diagnosis)  Comment: Related to endometrial carcinoma.  Doing well with supportive cares.  Pain and mood adequately controlled.  Plan:   - Continue routine hospice visits  - Continue supportive cares in the facility    (C54.1) Endometrial carcinoma (H)  Comment: Diagnosed May 2024 related to postmenopausal bleeding.  No recent reports of vaginal bleeding.  No abdominal pain or bloating.    (G30.1,  F02.C4) Severe late onset Alzheimer's dementia with anxiety (H)  Comment: During previous encounters, seems as though dementia was progressing.  Recent findings seem consistent with this.  Requiring significant assistance with daily activities.  Plan:   - Continue supportive cares in the facility    (R29.6) Falls frequently  Comment: Related to the above particular deconditioning.  Fortunately no recent falls since this past January.  Does require assistance with mobility and transfers.    (M41.9) Kyphoscoliosis  Comment: Related to history of osteoporosis, she has wished to avoid specific bone therapies.  Possibly contributing to impaired gait and falls, otherwise no concerns.    (K08.9) Poor dentition  Comment: Significantly poor dentition, recently seen by dental service.  Exam limited due to her refusal of multiple instruments.  However still looking into at least temporary partial dentures    (E03.9) Hypothyroidism, unspecified type  Comment: Last TSH November 2023 reasonable for age and function of 5.68.  However had been significantly elevated prior to this.  Recommend TSH to monitor stability as well as rule out as etiology of more recent cognitive change  Plan:   -TSH    (I87.2) Venous stasis dermatitis of both lower extremities  (I83.93) Asymptomatic varicose veins of both lower extremities  Comment:  Significant bilateral venous stasis dermatitis with hyperemia and erythema, mild to moderate swelling.  Also with varicose veins particularly on the left.  However no associated symptoms.  Plan:   - Continue routine skin cares  - Monitor for signs/symptoms of phlebitis    MED REC REQUIRED  Post Medication Reconciliation Status: medication reconcilation previously completed during another office visit      Orders:  [x] TSH    Electronically signed by:     Benjamin Rosenstein, MD, MA  [facility]     This note was completed with the assistance of dictation software. Typos and word substitution-errors are expected and unintended.      38 MINUTES SPENT BY ME on the date of service doing chart review, history, exam, documentation & further activities per the note.          Sincerely,        Benjamin Rosenstein, MD    Electronically signed

## 2025-06-16 NOTE — PROGRESS NOTES
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     Seen in her room today.  She is generally feeling pretty good.  Flowers recently planted on the window banister outside her room and she quite enjoys standing up to see them.  She is repetitive about this throughout her visit today.  Generally her mood is doing well.  Feels well supported by her care here.  Does note she requires frequent continence cares.  She is not having any pain.    She continues to engage in activities at the facility frequently.  Fortunately no recent falls.  Weights have remained stable to improved approximately 113 pounds.  She did see dentistry 4/29/2025 for routine cleaning.  Looking into interim partial dentures.    Last BIMS 4/15 significantly decreased from prior, overall downward trend.    Objective:   /76   Pulse 92   Temp 98.5  F (36.9  C)   Resp 16   Ht 1.626 m (5' 4\")   Wt 51.3 kg (113 lb 3.2 oz)   LMP  (LMP Unknown)   SpO2 97%   BMI 19.43 kg/m      GENERAL APPEARANCE: Seated comfortably, NAD  HENT:  NCAT, moderately [place], poor dentition  EYES:  Conjunctiva clear, anicteric, EOMI  PULM  Normal WOB on RA, lungs CTAB, no wheezes or crackles  CV:  RRR, S1/S2 normal, no murmurs; bilateral LE edema  ABDOMEN: Abdomen soft, not tender, not distended, no organomegaly, BS normal and active throughout   M/S:  Kyphoscoliosis  SKIN: Significant hyperemia of bilateral LEs with venous stasis changes of bilateral LEs, prominent varicose veins L > R without tenderness to palpation  NEURO: Alert, tangential, significant cognitive impairment; repetitive with poor " attention  PSYCH: Mood normal with bright affect    Assessment/Plan:    (Z51.5) Hospice care patient  (primary encounter diagnosis)  Comment: Related to endometrial carcinoma.  Doing well with supportive cares.  Pain and mood adequately controlled.  Plan:   - Continue routine hospice visits  - Continue supportive cares in the facility    (C54.1) Endometrial carcinoma (H)  Comment: Diagnosed May 2024 related to postmenopausal bleeding.  No recent reports of vaginal bleeding.  No abdominal pain or bloating.    (G30.1,  F02.C4) Severe late onset Alzheimer's dementia with anxiety (H)  Comment: During previous encounters, seems as though dementia was progressing.  Recent findings seem consistent with this.  Requiring significant assistance with daily activities.  Plan:   - Continue supportive cares in the facility    (R29.6) Falls frequently  Comment: Related to the above particular deconditioning.  Fortunately no recent falls since this past January.  Does require assistance with mobility and transfers.    (M41.9) Kyphoscoliosis  Comment: Related to history of osteoporosis, she has wished to avoid specific bone therapies.  Possibly contributing to impaired gait and falls, otherwise no concerns.    (K08.9) Poor dentition  Comment: Significantly poor dentition, recently seen by dental service.  Exam limited due to her refusal of multiple instruments.  However still looking into at least temporary partial dentures    (E03.9) Hypothyroidism, unspecified type  Comment: Last TSH November 2023 reasonable for age and function of 5.68.  However had been significantly elevated prior to this.  Recommend TSH to monitor stability as well as rule out as etiology of more recent cognitive change  Plan:   -TSH    (I87.2) Venous stasis dermatitis of both lower extremities  (I83.93) Asymptomatic varicose veins of both lower extremities  Comment: Significant bilateral venous stasis dermatitis with hyperemia and erythema, mild to moderate  swelling.  Also with varicose veins particularly on the left.  However no associated symptoms.  Plan:   - Continue routine skin cares  - Monitor for signs/symptoms of phlebitis    MED REC REQUIRED  Post Medication Reconciliation Status: medication reconcilation previously completed during another office visit      Orders:  [x] TSH    Electronically signed by:     Benjamin Rosenstein, MD, MA  [facility]     This note was completed with the assistance of dictation software. Typos and word substitution-errors are expected and unintended.      38 MINUTES SPENT BY ME on the date of service doing chart review, history, exam, documentation & further activities per the note.

## 2025-06-18 LAB — TSH SERPL DL<=0.005 MIU/L-ACNC: 2.41 UIU/ML (ref 0.3–4.2)

## 2025-06-18 PROCEDURE — 36415 COLL VENOUS BLD VENIPUNCTURE: CPT | Mod: ORL | Performed by: INTERNAL MEDICINE

## 2025-06-18 PROCEDURE — 84443 ASSAY THYROID STIM HORMONE: CPT | Mod: ORL | Performed by: INTERNAL MEDICINE

## 2025-06-18 PROCEDURE — P9604 ONE-WAY ALLOW PRORATED TRIP: HCPCS | Mod: ORL | Performed by: INTERNAL MEDICINE

## 2025-08-07 ENCOUNTER — ASSISTED LIVING VISIT (OUTPATIENT)
Dept: GERIATRICS | Facility: CLINIC | Age: 85
End: 2025-08-07
Payer: OTHER MISCELLANEOUS

## 2025-08-07 VITALS
DIASTOLIC BLOOD PRESSURE: 59 MMHG | WEIGHT: 113.8 LBS | OXYGEN SATURATION: 97 % | BODY MASS INDEX: 19.43 KG/M2 | SYSTOLIC BLOOD PRESSURE: 108 MMHG | HEART RATE: 82 BPM | RESPIRATION RATE: 16 BRPM | HEIGHT: 64 IN | TEMPERATURE: 97.5 F

## 2025-08-07 DIAGNOSIS — Z87.81 HISTORY OF FRACTURE OF RIGHT HIP: ICD-10-CM

## 2025-08-07 DIAGNOSIS — M47.817 LUMBOSACRAL SPONDYLOSIS WITHOUT MYELOPATHY: ICD-10-CM

## 2025-08-07 DIAGNOSIS — G89.4 CHRONIC PAIN SYNDROME: ICD-10-CM

## 2025-08-07 DIAGNOSIS — Z51.5 HOSPICE CARE PATIENT: Primary | ICD-10-CM

## 2025-08-07 DIAGNOSIS — F02.B4 MODERATE LATE ONSET ALZHEIMER'S DEMENTIA WITH ANXIETY (H): ICD-10-CM

## 2025-08-07 DIAGNOSIS — M81.0 AGE-RELATED OSTEOPOROSIS WITHOUT CURRENT PATHOLOGICAL FRACTURE: ICD-10-CM

## 2025-08-07 DIAGNOSIS — G30.1 MODERATE LATE ONSET ALZHEIMER'S DEMENTIA WITH ANXIETY (H): ICD-10-CM

## 2025-08-07 DIAGNOSIS — M41.9 KYPHOSCOLIOSIS: ICD-10-CM

## 2025-08-07 DIAGNOSIS — N39.46 URINARY INCONTINENCE, MIXED: ICD-10-CM

## 2025-08-07 DIAGNOSIS — C54.1 ENDOMETRIAL CARCINOMA (H): ICD-10-CM

## 2025-08-07 DIAGNOSIS — R35.0 URINARY FREQUENCY: ICD-10-CM

## 2025-08-07 DIAGNOSIS — I87.2 VENOUS STASIS DERMATITIS OF BOTH LOWER EXTREMITIES: ICD-10-CM

## 2025-08-07 DIAGNOSIS — E03.9 HYPOTHYROIDISM, UNSPECIFIED TYPE: ICD-10-CM

## 2025-08-07 DIAGNOSIS — N95.0 POSTMENOPAUSAL BLEEDING: ICD-10-CM

## 2025-08-07 DIAGNOSIS — E44.0 MODERATE PROTEIN-CALORIE MALNUTRITION: ICD-10-CM

## 2025-08-12 ENCOUNTER — TELEPHONE (OUTPATIENT)
Dept: GERIATRICS | Facility: CLINIC | Age: 85
End: 2025-08-12
Payer: OTHER MISCELLANEOUS

## 2025-09-02 ENCOUNTER — PATIENT OUTREACH (OUTPATIENT)
Dept: GERIATRIC MEDICINE | Facility: CLINIC | Age: 85
End: 2025-09-02
Payer: OTHER MISCELLANEOUS

## (undated) DEVICE — DRSG GAUZE 4X4" TRAY 6939

## (undated) DEVICE — SYR 50ML CATH TIP W/O NDL 309620

## (undated) DEVICE — GOWN XLG DISP 9545

## (undated) DEVICE — KIT PATIENT CARE HANA TABLE PROFX SUPINE 6855

## (undated) DEVICE — GLOVE BIOGEL PI MICRO INDICATOR UNDERGLOVE SZ 6.5 48965

## (undated) DEVICE — APPLICATOR COTTON TIP 6"X2 STERILE LF 6012

## (undated) DEVICE — SU PDS II 3-0 FS-1 27" CLR  Z442H

## (undated) DEVICE — PREP DURAPREP REMOVER 4OZ 8611

## (undated) DEVICE — DRSG AQUACEL AG 3.5X9.75" HYDROFIBER 412011

## (undated) DEVICE — PREP DYNA-HEX 4% CHG SCRUB 4OZ BOTTLE MDS098710

## (undated) DEVICE — GLOVE PROTEXIS BLUE W/NEU-THERA 8.0  2D73EB80

## (undated) DEVICE — LINEN TOWEL PACK X5 5464

## (undated) DEVICE — SU PDS II 0 TP-1 60" Z991G

## (undated) DEVICE — DRAPE CONVERTORS U-DRAPE 60X72" 8476

## (undated) DEVICE — Device

## (undated) DEVICE — SU DERMABOND ADVANCED .7ML DNX12

## (undated) DEVICE — GLOVE PROTEXIS POWDER FREE SMT 7.0  2D72PT70X

## (undated) DEVICE — LINEN TOWEL PACK X6 WHITE 5487

## (undated) DEVICE — SU ETHILON 3-0 PS-1 18" 1663H

## (undated) DEVICE — PAD CHUX UNDERPAD 30X36" P3036C

## (undated) DEVICE — SU VICRYL 0 CT-1 27" UND J260H

## (undated) DEVICE — KIT PATIENT CARE HANA PROFX W/BOOT LINER SUPINE 6851

## (undated) DEVICE — PACK CENTRAL LINE INSERTION SAN32CLFCG

## (undated) DEVICE — DECANTER BAG 2002S

## (undated) DEVICE — DRAPE SLEEVE 599

## (undated) DEVICE — JELLY LUBRICATING SURGILUBE 2OZ TUBE 0281-0205-02

## (undated) DEVICE — LINEN LEG DRAPE 5457

## (undated) DEVICE — SOL NACL 0.9% IRRIG 1000ML BOTTLE 2F7124

## (undated) DEVICE — TEST TUBE W/SCREW CAP 17361

## (undated) DEVICE — LINEN TOWEL PACK X30 5481

## (undated) DEVICE — PACK CYSTO UMMC CUSTOM

## (undated) DEVICE — GLOVE BIOGEL PI ULTRATOUCH G SZ 6.0 42160

## (undated) DEVICE — DRSG ADAPTIC 3X16"  6114

## (undated) DEVICE — SU MONOCRYL 3-0 PS-1 27" Y936H

## (undated) DEVICE — DRAPE SHEET REV FOLD 3/4 9349

## (undated) DEVICE — SOL NACL 0.9% IRRIG 3000ML BAG 2B7477

## (undated) DEVICE — BLADE SAW SAGITTAL STRK MED WIDE 25X73X0.89MM 2108-105-000

## (undated) DEVICE — CAST PADDING 4" COTTON SPECIALIST 100 UNSTERILE 7054

## (undated) DEVICE — GLOVE PROTEXIS POWDER FREE SMT 7.5  2D72PT75X

## (undated) DEVICE — SU ETHIBOND 1 CTX 30" X865H

## (undated) DEVICE — DRAPE IOBAN ISOLATION VERTICAL 320X21CM 6617

## (undated) DEVICE — DRAPE U SPLIT 74X120" 29440

## (undated) DEVICE — SU MONOCRYL 2-0 SH 27" UND Y417H

## (undated) DEVICE — SOL WATER IRRIG 1000ML BOTTLE 2F7114

## (undated) DEVICE — STRAP KNEE/BODY 31143004

## (undated) DEVICE — DRSG ABDOMINAL 07 1/2X8" 7197D

## (undated) DEVICE — PREP DURAPREP 26ML APL 8630

## (undated) DEVICE — LINEN GOWN X4 5410

## (undated) DEVICE — DEVICE RETRIEVER HEWSON 71111579

## (undated) DEVICE — GLOVE PROTEXIS W/NEU-THERA 7.5  2D73TE75

## (undated) RX ORDER — PROPOFOL 10 MG/ML
INJECTION, EMULSION INTRAVENOUS
Status: DISPENSED
Start: 2018-07-26

## (undated) RX ORDER — ONDANSETRON 2 MG/ML
INJECTION INTRAMUSCULAR; INTRAVENOUS
Status: DISPENSED
Start: 2024-05-15

## (undated) RX ORDER — HEPARIN SODIUM (PORCINE) LOCK FLUSH IV SOLN 100 UNIT/ML 100 UNIT/ML
SOLUTION INTRAVENOUS
Status: DISPENSED
Start: 2020-08-28

## (undated) RX ORDER — FERRIC SUBSULFATE 0.21 G/G
LIQUID TOPICAL
Status: DISPENSED
Start: 2018-01-12

## (undated) RX ORDER — HEPARIN SODIUM (PORCINE) LOCK FLUSH IV SOLN 100 UNIT/ML 100 UNIT/ML
SOLUTION INTRAVENOUS
Status: DISPENSED
Start: 2018-05-25

## (undated) RX ORDER — FENTANYL CITRATE 50 UG/ML
INJECTION, SOLUTION INTRAMUSCULAR; INTRAVENOUS
Status: DISPENSED
Start: 2024-05-15

## (undated) RX ORDER — HEPARIN SODIUM (PORCINE) LOCK FLUSH IV SOLN 100 UNIT/ML 100 UNIT/ML
SOLUTION INTRAVENOUS
Status: DISPENSED
Start: 2019-02-22

## (undated) RX ORDER — DEXAMETHASONE SODIUM PHOSPHATE 4 MG/ML
INJECTION, SOLUTION INTRA-ARTICULAR; INTRALESIONAL; INTRAMUSCULAR; INTRAVENOUS; SOFT TISSUE
Status: DISPENSED
Start: 2018-02-08

## (undated) RX ORDER — ACETAMINOPHEN 325 MG/1
TABLET ORAL
Status: DISPENSED
Start: 2018-02-08

## (undated) RX ORDER — LIDOCAINE HYDROCHLORIDE 20 MG/ML
INJECTION, SOLUTION EPIDURAL; INFILTRATION; INTRACAUDAL; PERINEURAL
Status: DISPENSED
Start: 2018-02-08

## (undated) RX ORDER — EPHEDRINE SULFATE 50 MG/ML
INJECTION, SOLUTION INTRAMUSCULAR; INTRAVENOUS; SUBCUTANEOUS
Status: DISPENSED
Start: 2018-07-26

## (undated) RX ORDER — HEPARIN SODIUM (PORCINE) LOCK FLUSH IV SOLN 100 UNIT/ML 100 UNIT/ML
SOLUTION INTRAVENOUS
Status: DISPENSED
Start: 2022-04-19

## (undated) RX ORDER — FENTANYL CITRATE 50 UG/ML
INJECTION, SOLUTION INTRAMUSCULAR; INTRAVENOUS
Status: DISPENSED
Start: 2018-07-26

## (undated) RX ORDER — PROPOFOL 10 MG/ML
INJECTION, EMULSION INTRAVENOUS
Status: DISPENSED
Start: 2024-05-15

## (undated) RX ORDER — LIDOCAINE HYDROCHLORIDE AND EPINEPHRINE 10; 10 MG/ML; UG/ML
INJECTION, SOLUTION INFILTRATION; PERINEURAL
Status: DISPENSED
Start: 2018-01-12

## (undated) RX ORDER — ONDANSETRON 2 MG/ML
INJECTION INTRAMUSCULAR; INTRAVENOUS
Status: DISPENSED
Start: 2018-02-08

## (undated) RX ORDER — LIDOCAINE 50 MG/G
OINTMENT TOPICAL
Status: DISPENSED
Start: 2018-01-12

## (undated) RX ORDER — PROPOFOL 10 MG/ML
INJECTION, EMULSION INTRAVENOUS
Status: DISPENSED
Start: 2018-02-08

## (undated) RX ORDER — HEPARIN SODIUM (PORCINE) LOCK FLUSH IV SOLN 100 UNIT/ML 100 UNIT/ML
SOLUTION INTRAVENOUS
Status: DISPENSED
Start: 2021-06-25

## (undated) RX ORDER — ACETAMINOPHEN 325 MG/1
TABLET ORAL
Status: DISPENSED
Start: 2024-05-15

## (undated) RX ORDER — CLINDAMYCIN PHOSPHATE 900 MG/50ML
INJECTION, SOLUTION INTRAVENOUS
Status: DISPENSED
Start: 2018-02-08

## (undated) RX ORDER — DEXAMETHASONE SODIUM PHOSPHATE 4 MG/ML
INJECTION, SOLUTION INTRA-ARTICULAR; INTRALESIONAL; INTRAMUSCULAR; INTRAVENOUS; SOFT TISSUE
Status: DISPENSED
Start: 2024-05-15

## (undated) RX ORDER — HEPARIN SODIUM (PORCINE) LOCK FLUSH IV SOLN 100 UNIT/ML 100 UNIT/ML
SOLUTION INTRAVENOUS
Status: DISPENSED
Start: 2024-05-15

## (undated) RX ORDER — KETOROLAC TROMETHAMINE 30 MG/ML
INJECTION, SOLUTION INTRAMUSCULAR; INTRAVENOUS
Status: DISPENSED
Start: 2024-05-15

## (undated) RX ORDER — FENTANYL CITRATE 50 UG/ML
INJECTION, SOLUTION INTRAMUSCULAR; INTRAVENOUS
Status: DISPENSED
Start: 2017-04-13

## (undated) RX ORDER — LIDOCAINE HYDROCHLORIDE 10 MG/ML
INJECTION, SOLUTION EPIDURAL; INFILTRATION; INTRACAUDAL; PERINEURAL
Status: DISPENSED
Start: 2024-05-15

## (undated) RX ORDER — PHENYLEPHRINE HCL IN 0.9% NACL 1 MG/10 ML
SYRINGE (ML) INTRAVENOUS
Status: DISPENSED
Start: 2018-07-26

## (undated) RX ORDER — HEPARIN SODIUM (PORCINE) LOCK FLUSH IV SOLN 100 UNIT/ML 100 UNIT/ML
SOLUTION INTRAVENOUS
Status: DISPENSED
Start: 2018-03-07

## (undated) RX ORDER — ONDANSETRON 2 MG/ML
INJECTION INTRAMUSCULAR; INTRAVENOUS
Status: DISPENSED
Start: 2018-07-26